# Patient Record
Sex: MALE | Race: WHITE | NOT HISPANIC OR LATINO | Employment: OTHER | URBAN - METROPOLITAN AREA
[De-identification: names, ages, dates, MRNs, and addresses within clinical notes are randomized per-mention and may not be internally consistent; named-entity substitution may affect disease eponyms.]

---

## 2017-01-06 ENCOUNTER — OFFICE VISIT (OUTPATIENT)
Dept: URGENT CARE | Facility: CLINIC | Age: 72
End: 2017-01-06
Payer: MEDICARE

## 2017-01-06 VITALS
WEIGHT: 242.94 LBS | BODY MASS INDEX: 35.26 KG/M2 | OXYGEN SATURATION: 96 % | TEMPERATURE: 96 F | DIASTOLIC BLOOD PRESSURE: 80 MMHG | HEART RATE: 73 BPM | SYSTOLIC BLOOD PRESSURE: 120 MMHG

## 2017-01-06 DIAGNOSIS — J06.9 ACUTE URI: Primary | ICD-10-CM

## 2017-01-06 DIAGNOSIS — J01.90 ACUTE NON-RECURRENT SINUSITIS, UNSPECIFIED LOCATION: Primary | ICD-10-CM

## 2017-01-06 LAB
CTP QC/QA: YES
S PYO RRNA THROAT QL PROBE: NEGATIVE

## 2017-01-06 PROCEDURE — 99213 OFFICE O/P EST LOW 20 MIN: CPT | Mod: S$GLB,,, | Performed by: PHYSICIAN ASSISTANT

## 2017-01-06 PROCEDURE — 99999 PR PBB SHADOW E&M-EST. PATIENT-LVL III: CPT | Mod: PBBFAC,,, | Performed by: PHYSICIAN ASSISTANT

## 2017-01-06 PROCEDURE — 87880 STREP A ASSAY W/OPTIC: CPT | Mod: QW,S$GLB,, | Performed by: PHYSICIAN ASSISTANT

## 2017-01-06 RX ORDER — AMOXICILLIN AND CLAVULANATE POTASSIUM 875; 125 MG/1; MG/1
1 TABLET, FILM COATED ORAL 2 TIMES DAILY
Qty: 14 TABLET | Refills: 0 | Status: SHIPPED | OUTPATIENT
Start: 2017-01-06 | End: 2017-01-09 | Stop reason: SINTOL

## 2017-01-06 NOTE — PROGRESS NOTES
Subjective:       Patient ID: Erendira Pretty is a 71 y.o. male.    Chief Complaint: Sinus Problem    HPI Comments: Patient states symptoms have been ongoing for over a week. He also states he has a nasal polyp superiorly in his L nostril that has been presents for 2wks.  He has a history of nasal polyps in the past for which he sees ENT.      Sinus Problem   This is a new problem. The current episode started 1 to 4 weeks ago. The problem has been gradually worsening since onset. There has been no fever. Associated symptoms include congestion, sinus pressure, sneezing and a sore throat. Pertinent negatives include no chills, coughing, diaphoresis, headaches, neck pain or shortness of breath. Treatments tried: Tylenol, Neti pot. The treatment provided mild relief.     Review of Systems   Constitutional: Negative for chills, diaphoresis and fever.   HENT: Positive for congestion, rhinorrhea, sinus pressure, sneezing and sore throat. Negative for postnasal drip.    Respiratory: Positive for wheezing (states this is chronic). Negative for cough, chest tightness and shortness of breath.    Gastrointestinal: Negative for abdominal pain, nausea and vomiting.   Musculoskeletal: Negative for back pain, neck pain and neck stiffness.   Neurological: Negative for dizziness, light-headedness and headaches.       Objective:      Physical Exam   Constitutional: Vital signs are normal. He appears well-developed and well-nourished.   HENT:   Head: Normocephalic and atraumatic.   Right Ear: Tympanic membrane and ear canal normal. No tenderness. Tympanic membrane is not erythematous.   Left Ear: Tympanic membrane and ear canal normal. No tenderness. Tympanic membrane is not erythematous.   Nose: Mucosal edema and rhinorrhea present. Right sinus exhibits no maxillary sinus tenderness and no frontal sinus tenderness. Left sinus exhibits no maxillary sinus tenderness and no frontal sinus tenderness.   Mouth/Throat: Uvula is midline and  oropharynx is clear and moist.   Unable to visualize polyp on exam   Cardiovascular: Normal rate and regular rhythm.    Pulmonary/Chest: Effort normal and breath sounds normal. No respiratory distress. He has no wheezes. He has no rhonchi. He has no rales.   Skin: Skin is warm and dry. He is not diaphoretic.       Assessment:       1. Acute URI        Plan:   Acute URI  -     POCT rapid strep A      Rx: AMOXICILLIN/POTASSIUM CLAV 1 tablet Oral BID      Reviewed negative rapid strep.     Printed and reviewed AVS.   Instructed patient to complete course of antibiotics and recommend nasal spray as well to help with sinus congestion.     Patient to see ENT on Monday for evaluation of his nasal polyp.      Patient instructions given in office:  Rest.  Drink plenty of clear fluids--at least 64 ounces of water/juice.  Normal saline nasal wash to irrigate sinuses and for congestion/runny nose.  Cool mist humidifier/vaporizer.  Practice good handwashing.  Zyrtec or Claritin to help dry mucus and post nasal drip.  Mucinex for cough and chest congestion.  Tylenol or Ibuprofen for fever, headache and body aches.  Warm salt water gargles for throat comfort.  Chloraseptic spray or lozenges for throat comfort.  See PCP or go to ER if symptoms worsen or fail to improve with treatment.

## 2017-01-06 NOTE — PATIENT INSTRUCTIONS
Sinusitis (Antibiotic Treatment)    The sinuses are air-filled spaces within the bones of the face. They connect to the inside of the nose. Sinusitis is an inflammation of the tissue lining the sinus cavity. Sinus inflammation can occur during a cold. It can also be due to allergies to pollens and other particles in the air. Sinusitis can cause symptoms of sinus congestion and fullness. A sinus infection causes fever, headache and facial pain. There is often green or yellow drainage from the nose or into the back of the throat (post-nasal drip). You have been given antibiotics to treat this condition.  Home care:  · Take the full course of antibiotics as instructed. Do not stop taking them, even if you feel better.  · Drink plenty of water, hot tea, and other liquids. This may help thin mucus. It also may promote sinus drainage.  · Heat may help soothe painful areas of the face. Use a towel soaked in hot water. Or,  the shower and direct the hot spray onto your face. Using a vaporizer along with a menthol rub at night may also help.   · An expectorant containing guaifenesin may help thin the mucus and promote drainage from the sinuses.  · Over-the-counter decongestants may be used unless a similar medicine was prescribed. Nasal sprays work the fastest. Use one that contains phenylephrine or oxymetazoline. First blow the nose gently. Then use the spray. Do not use these medicines more often than directed on the label or symptoms may get worse. You may also use tablets containing pseudoephedrine. Avoid products that combine ingredients, because side effects may be increased. Read labels. You can also ask the pharmacist for help. (NOTE: Persons with high blood pressure should not use decongestants. They can raise blood pressure.)  · Over-the-counter antihistamines may help if allergies contributed to your sinusitis.    · Do not use nasal rinses or irrigation during an acute sinus infection, unless told to by  your health care provider. Rinsing may spread the infection to other sinuses.  · Use acetaminophen or ibuprofen to control pain, unless another pain medicine was prescribed. (If you have chronic liver or kidney disease or ever had a stomach ulcer, talk with your doctor before using these medicines. Aspirin should never be used in anyone under 18 years of age who is ill with a fever. It may cause severe liver damage.)  · Don't smoke. This can worsen symptoms.  Follow-up care  Follow up with your healthcare provider or our staff if you are not improving within the next week.  When to seek medical advice  Call your healthcare provider if any of these occur:  · Facial pain or headache becoming more severe  · Stiff neck  · Unusual drowsiness or confusion  · Swelling of the forehead or eyelids  · Vision problems, including blurred or double vision  · Fever of 100.4ºF (38ºC) or higher, or as directed by your healthcare provider  · Seizure  · Breathing problems  · Symptoms not resolving within 10 days  © 6638-0276 [employer]. [address] [place] [address], [place] [address]. All rights reserved. This information is not intended as a substitute for professional medical care. Always follow your healthcare professional's instructions.    Rest.  Drink plenty of clear fluids--at least 64 ounces of water/juice.  Normal saline nasal wash to irrigate sinuses and for congestion/runny nose.  Cool mist humidifier/vaporizer.  Practice good handwashing.  Zyrtec or Claritin to help dry mucus and post nasal drip.  Mucinex for cough and chest congestion.  Tylenol or Ibuprofen for fever, headache and body aches.  Warm salt water gargles for throat comfort.  Chloraseptic spray or lozenges for throat comfort.  See PCP or go to ER if symptoms worsen or fail to improve with treatment.

## 2017-01-06 NOTE — MR AVS SNAPSHOT
[facility]  [id number] [place] [address]  Phone: [phone]  Fax: [phone]                  Erendira Pretty   2017 1:20 PM   Office Visit    Description:  Male : 1945   Provider:  Carmencita Clement PA-C   Department:  [facility]           Reason for Visit     Sinus Problem           Diagnoses this Visit        Comments    Acute URI    -  Primary            To Do List           Future Appointments        Provider Department Dept Phone    2017 1:30 PM Alejandro Parkinson MD [facility] [phone]    2017 10:25 AM LABORATORY, [facility] [phone]    2017 7:00 AM [facility] [phone]    2017 9:30 AM LABORATORY, [facility] [phone]    2017 8:00 AM Anna Tomas MD [facility] G[facility]ology [phone]      Goals (5 Years of Data)              Today    12/2/16    10/28/16    Plan meals           Notes - Note created  3/31/2016  4:13 PM by Nikhil Watson MD      Tips to Control Acid Reflux  To control acid reflux, youll need to make some basic diet and lifestyle changes. The simple steps outlined below may be all youll need to relieve discomfort.  Watch What You Eat    · Avoid fatty foods and spicy foods.  · Eat fewer acidic foods, such as citrus and tomato-based foods. These can increase symptoms.  · Limit drinking alcohol, caffeine, and fizzy beverages. All increase acid reflux.  · Try limiting chocolate, peppermint, and spearmint. These can worsen acid reflux in some people.  Watch When You Eat  · Avoid lying down for 3 hours after eating.  · Do not snack before going to bed.  Raise Your Head    Raising your head and upper body by 4 inches to 6 inches helps limit reflux when youre lying down. Put blocks under the head of the bed frame to raise it.    © 8318-8490 [employer]. [address] [place] [address], [place] [address]. All rights  reserved. This information is not intended as a substitute for professional medical care. Always follow your healthcare professional's instructions.      GERD (Adult)    The esophagus is a tube that carries food from the mouth to the stomach. A valve at the lower end of the esophagus prevents stomach acid from flowing upward. If this valve does not work properly, acid from the stomach enters the esophagus. If this occurs over and over, the acid will injure the lining of the esophagus.  This condition is called GERD (gastroesophageal reflux disease) or acid reflux. When stomach acid flows upward into the esophagus, it causes burning, pressure or sharp pain in the upper abdomen or mid to lower chest. The pain can spread to the neck, back, or shoulder, similar to heart pain (angina). There may be belching, an acid taste in the back of the throat, chronic cough, or sore throat or hoarseness. GERD symptoms often occur during the day after a big meal, but it can also occur at night when lying down. Smoking, as well as drinking alcohol, increases the risk of GERD.  GERD is a chronic condition. Once it begins, it is often lifelong. Treatment includes changes in eating habits and the use of acid blocker medications to decrease the amount of acid in the stomach.  Symptoms often improve with treatment, but if treatment is stopped, the symptoms usually return after a few months. So most persons with GERD will need to continue treatment.  Home Care:  · Take the prescribed acid blocker medication for the full course of treatment even if you begin to feel better sooner. This medication can take up to several days to fully control your symptoms. If you cant afford the prescribed medication, you can try over-the-counter acid blockers, such as Pepcid AC, Tagamet, Zantac, or Aciphex. If these do not relieve your symptoms, a stronger acid-blocker can be tried, such as Prilosec OTC.  · You can use antacids, such as Tums, Rolaids,  "Mylanta, or Maalox, for pain. This will be useful the first few days after starting acid blockers when the blockers havent started working yet. Follow the directions on the label. Liquid antacids may work better than tablets. Note that antacids can interfere with absorption of certain medications. Specifically, do not take Tagamet (cimetidine), Zantac (ranitidine), or Carafate (sucralfate) within 1 hour of taking an antacid. Talk with your pharmacist if you have any questions.  · Limit or avoid fatty, fried, and spicy foods, as well as coffee, chocolate, mint, and foods with high acid content such as tomatoes and citrus fruit and juices (orange, grapefruit, lemon).  · Avoid alcohol and smoking.  · Dont eat large meals, especially at night. Frequent, smaller meals are best. Do not lie down right after eating. And dont eat anything 3 hours before going to bed.  · If you are overweight, losing weight will reduce symptoms. Women should not wear corsets or girdles because this increases pressure on the stomach and worsens reflux.  · If your symptoms occur during sleep, use a foam wedge to elevate your upper body (not just your head.) Or, place 4" blocks under the head of your bed.  Follow Up  with your doctor or as advised by our staff. Further testing may be needed. If you do not begin to improve over the next 4 days, contact your doctor. If you had an x-ray, CT scan, or ECG (electrocardiogram), it will be reviewed by a specialist. Youll be notified of any new findings that affect your care.  Get Prompt Medical Attention  if any of the following occur:  · Stomach pain gets worse or moves to the lower right abdomen (appendix area)  · Chest pain appears or gets worse, or spreads to the back, neck, shoulder, or arm  · Frequent vomiting (cant keep down liquids)  · Blood in the stool or vomit (red or black in color)  · Feeling weak or dizzy, fainting, or trouble breathing  · Fever of 100.4ºF (38ºC) or higher, or as " directed by your healthcare provider  © 1254-1555 [employer]. [address] [place] [address], [place] [address]. All rights reserved. This information is not intended as a substitute for professional medical care. Always follow your healthcare professional's instructions.              Weight < 90.719 kg (200 lb)   110.2 kg (242 lb 15.2 oz)  108.2 kg (238 lb 8.6 oz)  108 kg (238 lb)    Notes - Note created  3/31/2016  4:12 PM by Nikhil Watson MD      Diabetes: Understanding Carbohydrates, Fats, and Protein  Food is a source of fuel and nourishment for your body. Its also a source of pleasure. Having diabetes doesnt mean you have to eat special foods or give up desserts. Instead, your dietitian can show you how to plan meals to suit your body. To start, learn how different foods affect blood sugar.    Carbohydrates  Carbohydrates are the main source of fuel for the body. Carbohydrates raise blood sugar. Many people think carbohydrates are only found in pasta or bread. But carbohydrates are actually in many kinds of foods:  · Sugars occur naturally in foods such as fruit, milk, honey, and molasses. Sugars can also be added to many foods, from cereals and yogurt to candy and desserts. Sugars raise blood sugar.  · Starches are found in bread, cereals, pasta, and dried beans. Theyre also found in corn, peas, potatoes, yam, acorn squash, and butternut squash. Starches also raise blood sugar.   · Fiber is found in foods such as vegetables, fruits, and whole grains. Unlike other carbs, fiber isnt digested or absorbed. So it doesnt raise blood sugar. In fact, fiber can help keep blood sugar from rising too fast. It also helps keep blood cholesterol at a healthy level.     Did You Know?  Even though carbohydrates raise blood sugar, its best to have some in every meal. They are an important part of a healthy diet.   Fat  Fat is an energy source that can be stored until needed. Fat does not raise blood sugar.  However, it can raise blood cholesterol, increasing the risk of heart disease. Fat is also high in calories, which can cause weight gain. Not all types of fat are the same.  More Healthy:  · Monounsaturated fats are mostly found in vegetable oils, such as olive, canola, and peanut oils. They are also found in avocados and some nuts. Monounsaturated fats are healthy for your heart. Thats because they lower LDL (unhealthy) cholesterol.  · Polyunsaturated fats are mostly found in vegetable oils, such as corn, safflower, and soybean oils. They are also found in some seeds, nuts, and fish. Polyunsaturated fats lower LDL (unhealthy) cholesterol. So, choosing them instead of saturated fats is healthy for your heart. Certain unsaturated fats can help lower triglycerides.   Less Healthy:  · Saturated fats are found in animal products, such as meat, poultry, whole milk, lard, and butter. Saturated fats raise LDL cholesterol and are not healthy for your heart.  · Hydrogenated oils and trans fats are formed when vegetable oils are processed into solid fats. They are found in many processed foods. Hydrogenated oils and trans fats raise LDL cholesterol and lower HDL (healthy) cholesterol. They are not healthy for your heart.  Protein  Protein helps the body build and repair muscle and other tissue. Protein has little or no effect on blood sugar. However, many foods that contain protein also contain saturated fat. By choosing low-fat protein sources, you can get the benefits of protein without the extra fat:  · Plant protein is found in dry beans and peas, nuts, and soy products, such as tofu and soymilk. These sources tend to be cholesterol-free and low in saturated fat.  · Animal protein is found in fish, poultry, meat, cheese, milk, and eggs. These contain cholesterol and can be high in saturated fat. Aim for lean, lower-fat choices.  © 3699-5317 [employer]. [address] [place] [address], [place] [address]. All rights  reserved. This information is not intended as a substitute for professional medical care. Always follow your healthcare professional's instructions.        Healthy Meals for Diabetes  Ask your health care team to help you make a meal plan that fits your needs. Your meal plan tells you when to eat your meals and snacks, what kinds of foods to eat, and how much of each food to eat. You dont have to give up all the foods you like. But you do need to follow some guidelines.  Eat foods rich in fiber  Fiber is a carbohydrate that breaks down slowly. Fiber is also healthy for your heart. Fiber-rich foods include:        Choose healthy protein foods  Eating protein that is low in fat can help you control your weight. It also helps keep your heart healthy. Low-fat protein foods include:  · Fish  · Plant proteins, such as dry beans and peas, nuts, and soy products like tofu and soymilk  · Lean meat with all visible fat removed  · Poultry with the skin removed  · Low-fat or nonfat milk, cheese, and yogurt  Limit unhealthy fats and sugar  Saturated and trans fats are unhealthy for your heart. They raise LDL (bad) cholesterol. Fat is also high in calories, so it can make you gain weight. To cut down on unhealthy fats and sugar, limit these foods:        How much to eat  The amount of food you eat affects your blood sugar. It also affects your weight. Your health care team will tell you how much of each type of food you should eat.  · Use measuring cups and spoons and a food scale to measure serving sizes.  · Learn what a correct serving size looks like on your plate. This will help when you are away from home and cant measure your servings.  · Eat only the number of servings given on your meal plan for each food. Dont take seconds.  When to eat  Your meal plan will likely include breakfast, lunch, dinner, and some snacks.  · Try to eat your meals and snacks at about the same times each day.  · Eat all your meals and snacks.  Skipping a meal or snack can make your blood sugar drop too low. It can also cause you to eat too much at the next meal or snack. Then your blood sugar could get too high.  © 2370-9667 [employer] [address] [place] [address], [place] [address]. All rights reserved. This information is not intended as a substitute for professional medical care. Always follow your healthcare professional's instructions.      Metabolic Syndrome: Losing Excess Weight  Metabolic syndrome is a set of 5 health factors that can lead to serious health problems. The factors greatly increase your risk for diabetes, heart attack, or stroke. Extra weight with a large waist is one of the factors for metabolic syndrome. Being overweight or obese means that you weigh too much for what is healthy for your height. A large waist size is 40 inches or more for men, and 35 inches or more for women.  But you can take steps to lose weight and lower your risk for serious health problems.  Benefits of weight loss  Even with a small weight loss, you may have more energy and feel better. Losing even a small amount of weight can affect your blood pressure, triglycerides, HDL cholesterol, and blood sugar. You may be able to take less medicine for blood pressure, cholesterol, or blood sugar. Or you may be able to stop taking medicine. As you lose weight, your risk for diabetes, heart attack, and stroke will get lower.  Getting started  The best way to lose weight is to do it gradually. For example, lose 1/2 to 1 pound a week. You will need to be more active and eat healthier foods. Make sure to:  · Exercise every day. Talk with your health care provider to make sure it is safe for you to exercise. Make sure you start slowly. Begin with 10 to 15 minutes of activity. Try to exercise or be active for at least 30 minutes most days of the week. You can exercise all at once or break it up into 10- or 15-minute sessions. And think about other ways you can be  more active throughout the day.  · Eat healthy foods. Most successful dieters make changes in what, when, and how much they eat. The best way to lose weight is to eat fewer calories. You should make sure you check your portion sizes, eat breakfast, plan your meals and snacks, and eat slowly.  Working with your health care provider  Talk with your health care provider. He or she can guide you through the process of losing weight. As you begin to make changes, your health care provider will:  · Check your weight loss progress  · Check your blood pressure and blood test results  · Talk with you about your results  · Make suggestions about diet and exercise  · Recommend other experts or programs  · Make changes to your medicines and help with any side effects  Getting additional support  It can be hard to make healthy lifestyle changes. It may take some time to create new habits.  Your health care provider may suggest other experts or programs to help you, such as:  · Health . A health  gives ongoing support and makes suggestions to help you with healthy lifestyle changes, like weight loss.  · Weight loss programs. There are many safe weight loss programs. Some are free or low-cost.  · Dietician. He or she can help you make changes to your diet.  · Exercise specialist. He or she can help you with an exercise plan.  · Counselor. A counselor can help you deal with your feelings and emotions. There are psychiatrists, psychologists, and social workers who specialize in weight problems.  · Bariatric or obesity specialist.  These health care providers are experts in obesity. They can help with diet, exercise, behavioral therapy or counseling, medicines for weight loss, and very low-calorie diets.  · Bariatric surgeon. Weight loss surgery may be a choice. But it is only advised for people who are over a certain weight, who have health problems because of their weight, and who have not been able to lose weight with  "other treatments.  Keeping the weight off  After losing weight, keeping it off can be even harder. Dont give up. Make sure to:  · Keep exercising. That means at least 40 to 60 minutes most days of the week.  · Keep eating healthy foods. Continue eating foods that are healthy and avoiding those that arent.  · Stay motivated. Watch your health improve. If you eat something unhealthy or skip exercising, dont give up. Simply make your next choice a healthy one.  © 8669-1557 [employer] [address] [place] [address], [place] [address]. All rights reserved. This information is not intended as a substitute for professional medical care. Always follow your healthcare professional's instructions.                Follow-Up and Disposition     Return if symptoms worsen or fail to improve.      [facility]     [facility] Nurse [facility] Line - 24/7 Assistance  Registered nurses in the [facility] Center provide clinical advisement, health education, appointment booking, and other advisory services.  Call for this free service at [phone].             Medications           Message regarding Medications     Verify the changes and/or additions to your medication regime listed below are the same as discussed with your clinician today.  If any of these changes or additions are incorrect, please notify your healthcare provider.             Verify that the below list of medications is an accurate representation of the medications you are currently taking.  If none reported, the list may be blank. If incorrect, please contact your healthcare provider. Carry this list with you in case of emergency.           Current Medications     ACIPHEX 20 mg tablet TAKE 1 TABLET BY MOUTH TWO TIMES A DAY    BD INSULIN PEN NEEDLE UF MINI 31 x 3/16 " Ndle USE AS DIRECTED    blood sugar diagnostic (BLOOD GLUCOSE TEST) Strp 1 strip by Misc.(Non-Drug; Combo Route) route 3 (three) times daily. accu-chek lux plus    " budesonide-formoterol 160-4.5 mcg (SYMBICORT) 160-4.5 mcg/actuation HFAA Inhale 2 puffs into the lungs daily as needed.    cycloSPORINE (RESTASIS) 0.05 % ophthalmic emulsion Place 0.4 mLs (1 drop total) into both eyes 2 (two) times daily.    GLUCOSAMINE HCL/CHONDR ROSARIO A NA (OSTEO BI-FLEX ORAL) Take 1 tablet by mouth once daily.    HUMALOG KWIKPEN 100 unit/mL InPn pen INJECT 3-4 UNITS INTO THE SKIN THREE TIMES DAILY BEFORE MEALS.    insulin glargine (LANTUS SOLOSTAR) 100 unit/mL (3 mL) InPn pen Inject 32 Units into the skin once daily. Inject 25-35 units into the skin once daily.    lancets (ACCU-CHEK FASTCLIX) Misc Test 2-3 times daily    LANTUS SOLOSTAR 100 unit/mL (3 mL) InPn pen INJECT 25-35 UNITS INTO THE SKIN ONCE DAILY.    levothyroxine (SYNTHROID) 137 MCG Tab tablet Take 2 tablets (274 mcg total) by mouth before breakfast.    metoprolol succinate (TOPROL-XL) 50 MG 24 hr tablet TAKE ONE BY MOUTH TWO TIMES A DAY.    MV,CA,IRON,MIN/FA/PHYTOSTEROL (CENTRUM SPECIALIST HEART ORAL) Take 1 tablet by mouth 2 (two) times daily.    niacin, inositol niacinate, (NIACIN FLUSH FREE) 400 mg niacin (500 mg) Cap Take 500 mg by mouth every evening.    OMEGA-3 FATTY ACIDS/DHA/EPA (MEGARED PLANT-OMEGA-3 ORAL) Take 1 capsule by mouth once daily.    psyllium (METAMUCIL) 0.52 gram capsule Take by mouth.    saw palmetto 80 MG capsule Take 80 mg by mouth 2 (two) times daily.    telmisartan-amlodipine 80-10 mg Tab Take 1 tablet by mouth once daily.           Clinical Reference Information           Vital Signs - Last Recorded  Most recent update: 1/6/2017  1:56 PM by Mehdi Mcdermott MA    BP Pulse Temp    120/80 (BP Location: Left arm, Patient Position: Sitting, BP Method: Manual) 73 96.3 °F (35.7 °C) (Tympanic)    Wt SpO2 BMI    110.2 kg (242 lb 15.2 oz) 96% 35.26 kg/m2      Blood Pressure          Most Recent Value    BP  120/80      Allergies as of 1/6/2017     Iodinated Contrast Media - Iv Dye    Omeprazole    Prilosec [Omeprazole  Magnesium]      Immunizations Administered on Date of Encounter - 1/6/2017     None      Orders Placed During Today's Visit      Normal Orders This Visit    POCT rapid strep A          1/6/2017  2:08 PM - Janie Mejia LPN      Component Results     Component Value Flag Ref Range Units Status    Rapid Strep A Screen Negative  Negative  Final     Acceptable Yes    Final            Instructions      Sinusitis (Antibiotic Treatment)    The sinuses are air-filled spaces within the bones of the face. They connect to the inside of the nose. Sinusitis is an inflammation of the tissue lining the sinus cavity. Sinus inflammation can occur during a cold. It can also be due to allergies to pollens and other particles in the air. Sinusitis can cause symptoms of sinus congestion and fullness. A sinus infection causes fever, headache and facial pain. There is often green or yellow drainage from the nose or into the back of the throat (post-nasal drip). You have been given antibiotics to treat this condition.  Home care:  · Take the full course of antibiotics as instructed. Do not stop taking them, even if you feel better.  · Drink plenty of water, hot tea, and other liquids. This may help thin mucus. It also may promote sinus drainage.  · Heat may help soothe painful areas of the face. Use a towel soaked in hot water. Or,  the shower and direct the hot spray onto your face. Using a vaporizer along with a menthol rub at night may also help.   · An expectorant containing guaifenesin may help thin the mucus and promote drainage from the sinuses.  · Over-the-counter decongestants may be used unless a similar medicine was prescribed. Nasal sprays work the fastest. Use one that contains phenylephrine or oxymetazoline. First blow the nose gently. Then use the spray. Do not use these medicines more often than directed on the label or symptoms may get worse. You may also use tablets containing  pseudoephedrine. Avoid products that combine ingredients, because side effects may be increased. Read labels. You can also ask the pharmacist for help. (NOTE: Persons with high blood pressure should not use decongestants. They can raise blood pressure.)  · Over-the-counter antihistamines may help if allergies contributed to your sinusitis.    · Do not use nasal rinses or irrigation during an acute sinus infection, unless told to by your health care provider. Rinsing may spread the infection to other sinuses.  · Use acetaminophen or ibuprofen to control pain, unless another pain medicine was prescribed. (If you have chronic liver or kidney disease or ever had a stomach ulcer, talk with your doctor before using these medicines. Aspirin should never be used in anyone under 18 years of age who is ill with a fever. It may cause severe liver damage.)  · Don't smoke. This can worsen symptoms.  Follow-up care  Follow up with your healthcare provider or our staff if you are not improving within the next week.  When to seek medical advice  Call your healthcare provider if any of these occur:  · Facial pain or headache becoming more severe  · Stiff neck  · Unusual drowsiness or confusion  · Swelling of the forehead or eyelids  · Vision problems, including blurred or double vision  · Fever of 100.4ºF (38ºC) or higher, or as directed by your healthcare provider  · Seizure  · Breathing problems  · Symptoms not resolving within 10 days  © 1902-2036 [employer]. [address] [place] [address]. All rights reserved. This information is not intended as a substitute for professional medical care. Always follow your healthcare professional's instructions.    Rest.  Drink plenty of clear fluids--at least 64 ounces of water/juice.  Normal saline nasal wash to irrigate sinuses and for congestion/runny nose.  Cool mist humidifier/vaporizer.  Practice good handwashing.  Zyrtec or Claritin to help dry mucus and post  nasal drip.  Mucinex for cough and chest congestion.  Tylenol or Ibuprofen for fever, headache and body aches.  Warm salt water gargles for throat comfort.  Chloraseptic spray or lozenges for throat comfort.  See PCP or go to ER if symptoms worsen or fail to improve with treatment.

## 2017-01-09 ENCOUNTER — OFFICE VISIT (OUTPATIENT)
Dept: OTOLARYNGOLOGY | Facility: CLINIC | Age: 72
End: 2017-01-09
Payer: MEDICARE

## 2017-01-09 ENCOUNTER — LAB VISIT (OUTPATIENT)
Dept: LAB | Facility: HOSPITAL | Age: 72
End: 2017-01-09
Attending: OTOLARYNGOLOGY
Payer: MEDICARE

## 2017-01-09 VITALS — BODY MASS INDEX: 35.2 KG/M2 | WEIGHT: 242.5 LBS | TEMPERATURE: 98 F

## 2017-01-09 DIAGNOSIS — J32.4 CHRONIC PANSINUSITIS: Primary | ICD-10-CM

## 2017-01-09 DIAGNOSIS — J33.9 MULTIPLE NASAL POLYPS: ICD-10-CM

## 2017-01-09 DIAGNOSIS — J32.4 CHRONIC PANSINUSITIS: ICD-10-CM

## 2017-01-09 LAB
BASOPHILS # BLD AUTO: 0.06 K/UL
BASOPHILS NFR BLD: 0.8 %
DIFFERENTIAL METHOD: ABNORMAL
EOSINOPHIL # BLD AUTO: 0.3 K/UL
EOSINOPHIL NFR BLD: 4.4 %
ERYTHROCYTE [DISTWIDTH] IN BLOOD BY AUTOMATED COUNT: 12.9 %
HCT VFR BLD AUTO: 43.7 %
HGB BLD-MCNC: 15.5 G/DL
LYMPHOCYTES # BLD AUTO: 2.6 K/UL
LYMPHOCYTES NFR BLD: 36.6 %
MCH RBC QN AUTO: 33.9 PG
MCHC RBC AUTO-ENTMCNC: 35.5 %
MCV RBC AUTO: 96 FL
MONOCYTES # BLD AUTO: 0.8 K/UL
MONOCYTES NFR BLD: 11.7 %
NEUTROPHILS # BLD AUTO: 3.3 K/UL
NEUTROPHILS NFR BLD: 46.2 %
PLATELET # BLD AUTO: 135 K/UL
PMV BLD AUTO: 11 FL
RBC # BLD AUTO: 4.57 M/UL
WBC # BLD AUTO: 7.11 K/UL

## 2017-01-09 PROCEDURE — 36415 COLL VENOUS BLD VENIPUNCTURE: CPT | Mod: PO

## 2017-01-09 PROCEDURE — 88305 TISSUE EXAM BY PATHOLOGIST: CPT | Mod: 26,,, | Performed by: PATHOLOGY

## 2017-01-09 PROCEDURE — 85025 COMPLETE CBC W/AUTO DIFF WBC: CPT

## 2017-01-09 PROCEDURE — 86003 ALLG SPEC IGE CRUDE XTRC EA: CPT | Mod: 59

## 2017-01-09 PROCEDURE — 99999 PR PBB SHADOW E&M-EST. PATIENT-LVL III: CPT | Mod: PBBFAC,,, | Performed by: OTOLARYNGOLOGY

## 2017-01-09 PROCEDURE — 88305 TISSUE EXAM BY PATHOLOGIST: CPT | Performed by: PATHOLOGY

## 2017-01-09 PROCEDURE — 31237 NSL/SINS NDSC SURG BX POLYPC: CPT | Mod: S$GLB,,, | Performed by: OTOLARYNGOLOGY

## 2017-01-09 PROCEDURE — 99203 OFFICE O/P NEW LOW 30 MIN: CPT | Mod: 25,S$GLB,, | Performed by: OTOLARYNGOLOGY

## 2017-01-09 PROCEDURE — 86003 ALLG SPEC IGE CRUDE XTRC EA: CPT

## 2017-01-09 RX ORDER — MOMETASONE FUROATE 50 UG/1
2 SPRAY, METERED NASAL DAILY
Qty: 17 G | Refills: 12 | Status: SHIPPED | OUTPATIENT
Start: 2017-01-09 | End: 2017-02-27 | Stop reason: SDUPTHER

## 2017-01-09 RX ORDER — LEVOFLOXACIN 500 MG/1
500 TABLET, FILM COATED ORAL DAILY
Qty: 21 TABLET | Refills: 0 | Status: SHIPPED | OUTPATIENT
Start: 2017-01-09 | End: 2017-01-30

## 2017-01-09 NOTE — MR AVS SNAPSHOT
[facility] - ENT  [address] [facility] [place] [address]  Phone: [phone]  Fax: [phone]                  Erendira Pretty   2017 1:30 PM   Office Visit    Description:  Male : 1945   Provider:  Alejandro Parkinson MD   Department:  [facility] - ENT           Reason for Visit     Nasal Polyps           Diagnoses this Visit        Comments    Chronic pansinusitis    -  Primary     Multiple nasal polyps                To Do List           Future Appointments        Provider Department Dept Phone    2017 5:45 PM LAB, SAME DAY [facility] [phone]    2017 10:25 AM LABORATORY, [facility] [phone]    2017 8:15 AM Alejandro Parkinson MD [facility] [phone]    2017 7:00 AM [facility] [phone]    2017 9:30 AM LABORATORY, [facility] [phone]      Goals (5 Years of Data)              Today    17    Plan meals           Notes - Note created  3/31/2016  4:13 PM by Nikhil Watson MD      Tips to Control Acid Reflux  To control acid reflux, youll need to make some basic diet and lifestyle changes. The simple steps outlined below may be all youll need to relieve discomfort.  Watch What You Eat    · Avoid fatty foods and spicy foods.  · Eat fewer acidic foods, such as citrus and tomato-based foods. These can increase symptoms.  · Limit drinking alcohol, caffeine, and fizzy beverages. All increase acid reflux.  · Try limiting chocolate, peppermint, and spearmint. These can worsen acid reflux in some people.  Watch When You Eat  · Avoid lying down for 3 hours after eating.  · Do not snack before going to bed.  Raise Your Head    Raising your head and upper body by 4 inches to 6 inches helps limit reflux when youre lying down. Put blocks under the head of the bed frame to raise it.    © 3267-5635 [employer]. [address] [place] [address], [place] [address]. All rights  reserved. This information is not intended as a substitute for professional medical care. Always follow your healthcare professional's instructions.      GERD (Adult)    The esophagus is a tube that carries food from the mouth to the stomach. A valve at the lower end of the esophagus prevents stomach acid from flowing upward. If this valve does not work properly, acid from the stomach enters the esophagus. If this occurs over and over, the acid will injure the lining of the esophagus.  This condition is called GERD (gastroesophageal reflux disease) or acid reflux. When stomach acid flows upward into the esophagus, it causes burning, pressure or sharp pain in the upper abdomen or mid to lower chest. The pain can spread to the neck, back, or shoulder, similar to heart pain (angina). There may be belching, an acid taste in the back of the throat, chronic cough, or sore throat or hoarseness. GERD symptoms often occur during the day after a big meal, but it can also occur at night when lying down. Smoking, as well as drinking alcohol, increases the risk of GERD.  GERD is a chronic condition. Once it begins, it is often lifelong. Treatment includes changes in eating habits and the use of acid blocker medications to decrease the amount of acid in the stomach.  Symptoms often improve with treatment, but if treatment is stopped, the symptoms usually return after a few months. So most persons with GERD will need to continue treatment.  Home Care:  · Take the prescribed acid blocker medication for the full course of treatment even if you begin to feel better sooner. This medication can take up to several days to fully control your symptoms. If you cant afford the prescribed medication, you can try over-the-counter acid blockers, such as Pepcid AC, Tagamet, Zantac, or Aciphex. If these do not relieve your symptoms, a stronger acid-blocker can be tried, such as Prilosec OTC.  · You can use antacids, such as Tums, Rolaids,  "Mylanta, or Maalox, for pain. This will be useful the first few days after starting acid blockers when the blockers havent started working yet. Follow the directions on the label. Liquid antacids may work better than tablets. Note that antacids can interfere with absorption of certain medications. Specifically, do not take Tagamet (cimetidine), Zantac (ranitidine), or Carafate (sucralfate) within 1 hour of taking an antacid. Talk with your pharmacist if you have any questions.  · Limit or avoid fatty, fried, and spicy foods, as well as coffee, chocolate, mint, and foods with high acid content such as tomatoes and citrus fruit and juices (orange, grapefruit, lemon).  · Avoid alcohol and smoking.  · Dont eat large meals, especially at night. Frequent, smaller meals are best. Do not lie down right after eating. And dont eat anything 3 hours before going to bed.  · If you are overweight, losing weight will reduce symptoms. Women should not wear corsets or girdles because this increases pressure on the stomach and worsens reflux.  · If your symptoms occur during sleep, use a foam wedge to elevate your upper body (not just your head.) Or, place 4" blocks under the head of your bed.  Follow Up  with your doctor or as advised by our staff. Further testing may be needed. If you do not begin to improve over the next 4 days, contact your doctor. If you had an x-ray, CT scan, or ECG (electrocardiogram), it will be reviewed by a specialist. Youll be notified of any new findings that affect your care.  Get Prompt Medical Attention  if any of the following occur:  · Stomach pain gets worse or moves to the lower right abdomen (appendix area)  · Chest pain appears or gets worse, or spreads to the back, neck, shoulder, or arm  · Frequent vomiting (cant keep down liquids)  · Blood in the stool or vomit (red or black in color)  · Feeling weak or dizzy, fainting, or trouble breathing  · Fever of 100.4ºF (38ºC) or higher, or as " directed by your healthcare provider  © 3126-6740 [employer]. [address] [place] [address], [place] [address]. All rights reserved. This information is not intended as a substitute for professional medical care. Always follow your healthcare professional's instructions.              Weight < 90.719 kg (200 lb)   110 kg (242 lb 8.1 oz)  110.2 kg (242 lb 15.2 oz)  108.2 kg (238 lb 8.6 oz)    Notes - Note created  3/31/2016  4:12 PM by Nikhil Watson MD      Diabetes: Understanding Carbohydrates, Fats, and Protein  Food is a source of fuel and nourishment for your body. Its also a source of pleasure. Having diabetes doesnt mean you have to eat special foods or give up desserts. Instead, your dietitian can show you how to plan meals to suit your body. To start, learn how different foods affect blood sugar.    Carbohydrates  Carbohydrates are the main source of fuel for the body. Carbohydrates raise blood sugar. Many people think carbohydrates are only found in pasta or bread. But carbohydrates are actually in many kinds of foods:  · Sugars occur naturally in foods such as fruit, milk, honey, and molasses. Sugars can also be added to many foods, from cereals and yogurt to candy and desserts. Sugars raise blood sugar.  · Starches are found in bread, cereals, pasta, and dried beans. Theyre also found in corn, peas, potatoes, yam, acorn squash, and butternut squash. Starches also raise blood sugar.   · Fiber is found in foods such as vegetables, fruits, and whole grains. Unlike other carbs, fiber isnt digested or absorbed. So it doesnt raise blood sugar. In fact, fiber can help keep blood sugar from rising too fast. It also helps keep blood cholesterol at a healthy level.     Did You Know?  Even though carbohydrates raise blood sugar, its best to have some in every meal. They are an important part of a healthy diet.   Fat  Fat is an energy source that can be stored until needed. Fat does not raise blood sugar.  However, it can raise blood cholesterol, increasing the risk of heart disease. Fat is also high in calories, which can cause weight gain. Not all types of fat are the same.  More Healthy:  · Monounsaturated fats are mostly found in vegetable oils, such as olive, canola, and peanut oils. They are also found in avocados and some nuts. Monounsaturated fats are healthy for your heart. Thats because they lower LDL (unhealthy) cholesterol.  · Polyunsaturated fats are mostly found in vegetable oils, such as corn, safflower, and soybean oils. They are also found in some seeds, nuts, and fish. Polyunsaturated fats lower LDL (unhealthy) cholesterol. So, choosing them instead of saturated fats is healthy for your heart. Certain unsaturated fats can help lower triglycerides.   Less Healthy:  · Saturated fats are found in animal products, such as meat, poultry, whole milk, lard, and butter. Saturated fats raise LDL cholesterol and are not healthy for your heart.  · Hydrogenated oils and trans fats are formed when vegetable oils are processed into solid fats. They are found in many processed foods. Hydrogenated oils and trans fats raise LDL cholesterol and lower HDL (healthy) cholesterol. They are not healthy for your heart.  Protein  Protein helps the body build and repair muscle and other tissue. Protein has little or no effect on blood sugar. However, many foods that contain protein also contain saturated fat. By choosing low-fat protein sources, you can get the benefits of protein without the extra fat:  · Plant protein is found in dry beans and peas, nuts, and soy products, such as tofu and soymilk. These sources tend to be cholesterol-free and low in saturated fat.  · Animal protein is found in fish, poultry, meat, cheese, milk, and eggs. These contain cholesterol and can be high in saturated fat. Aim for lean, lower-fat choices.  © 4058-5258 [employer]. [address] [place] [address], [place] [address]. All rights  reserved. This information is not intended as a substitute for professional medical care. Always follow your healthcare professional's instructions.        Healthy Meals for Diabetes  Ask your health care team to help you make a meal plan that fits your needs. Your meal plan tells you when to eat your meals and snacks, what kinds of foods to eat, and how much of each food to eat. You dont have to give up all the foods you like. But you do need to follow some guidelines.  Eat foods rich in fiber  Fiber is a carbohydrate that breaks down slowly. Fiber is also healthy for your heart. Fiber-rich foods include:        Choose healthy protein foods  Eating protein that is low in fat can help you control your weight. It also helps keep your heart healthy. Low-fat protein foods include:  · Fish  · Plant proteins, such as dry beans and peas, nuts, and soy products like tofu and soymilk  · Lean meat with all visible fat removed  · Poultry with the skin removed  · Low-fat or nonfat milk, cheese, and yogurt  Limit unhealthy fats and sugar  Saturated and trans fats are unhealthy for your heart. They raise LDL (bad) cholesterol. Fat is also high in calories, so it can make you gain weight. To cut down on unhealthy fats and sugar, limit these foods:        How much to eat  The amount of food you eat affects your blood sugar. It also affects your weight. Your health care team will tell you how much of each type of food you should eat.  · Use measuring cups and spoons and a food scale to measure serving sizes.  · Learn what a correct serving size looks like on your plate. This will help when you are away from home and cant measure your servings.  · Eat only the number of servings given on your meal plan for each food. Dont take seconds.  When to eat  Your meal plan will likely include breakfast, lunch, dinner, and some snacks.  · Try to eat your meals and snacks at about the same times each day.  · Eat all your meals and snacks.  Skipping a meal or snack can make your blood sugar drop too low. It can also cause you to eat too much at the next meal or snack. Then your blood sugar could get too high.  © 9313-3818 [employer]. [address] [place] [address], [place] [address]. All rights reserved. This information is not intended as a substitute for professional medical care. Always follow your healthcare professional's instructions.      Metabolic Syndrome: Losing Excess Weight  Metabolic syndrome is a set of 5 health factors that can lead to serious health problems. The factors greatly increase your risk for diabetes, heart attack, or stroke. Extra weight with a large waist is one of the factors for metabolic syndrome. Being overweight or obese means that you weigh too much for what is healthy for your height. A large waist size is 40 inches or more for men, and 35 inches or more for women.  But you can take steps to lose weight and lower your risk for serious health problems.  Benefits of weight loss  Even with a small weight loss, you may have more energy and feel better. Losing even a small amount of weight can affect your blood pressure, triglycerides, HDL cholesterol, and blood sugar. You may be able to take less medicine for blood pressure, cholesterol, or blood sugar. Or you may be able to stop taking medicine. As you lose weight, your risk for diabetes, heart attack, and stroke will get lower.  Getting started  The best way to lose weight is to do it gradually. For example, lose 1/2 to 1 pound a week. You will need to be more active and eat healthier foods. Make sure to:  · Exercise every day. Talk with your health care provider to make sure it is safe for you to exercise. Make sure you start slowly. Begin with 10 to 15 minutes of activity. Try to exercise or be active for at least 30 minutes most days of the week. You can exercise all at once or break it up into 10- or 15-minute sessions. And think about other ways you can be  more active throughout the day.  · Eat healthy foods. Most successful dieters make changes in what, when, and how much they eat. The best way to lose weight is to eat fewer calories. You should make sure you check your portion sizes, eat breakfast, plan your meals and snacks, and eat slowly.  Working with your health care provider  Talk with your health care provider. He or she can guide you through the process of losing weight. As you begin to make changes, your health care provider will:  · Check your weight loss progress  · Check your blood pressure and blood test results  · Talk with you about your results  · Make suggestions about diet and exercise  · Recommend other experts or programs  · Make changes to your medicines and help with any side effects  Getting additional support  It can be hard to make healthy lifestyle changes. It may take some time to create new habits.  Your health care provider may suggest other experts or programs to help you, such as:  · Health . A health  gives ongoing support and makes suggestions to help you with healthy lifestyle changes, like weight loss.  · Weight loss programs. There are many safe weight loss programs. Some are free or low-cost.  · Dietician. He or she can help you make changes to your diet.  · Exercise specialist. He or she can help you with an exercise plan.  · Counselor. A counselor can help you deal with your feelings and emotions. There are psychiatrists, psychologists, and social workers who specialize in weight problems.  · Bariatric or obesity specialist.  These health care providers are experts in obesity. They can help with diet, exercise, behavioral therapy or counseling, medicines for weight loss, and very low-calorie diets.  · Bariatric surgeon. Weight loss surgery may be a choice. But it is only advised for people who are over a certain weight, who have health problems because of their weight, and who have not been able to lose weight with  other treatments.  Keeping the weight off  After losing weight, keeping it off can be even harder. Dont give up. Make sure to:  · Keep exercising. That means at least 40 to 60 minutes most days of the week.  · Keep eating healthy foods. Continue eating foods that are healthy and avoiding those that arent.  · Stay motivated. Watch your health improve. If you eat something unhealthy or skip exercising, dont give up. Simply make your next choice a healthy one.  © 6495-0664 [employer]. [address] [place] [address], [place] [address]. All rights reserved. This information is not intended as a substitute for professional medical care. Always follow your healthcare professional's instructions.                 These Medications        Disp Refills Start End    levoFLOXacin (LEVAQUIN) 500 MG tablet 21 tablet 0 1/9/2017 1/30/2017    Take 1 tablet (500 mg total) by mouth once daily. - Oral    Pharmacy: [facility] [address] Ph #: [phone]       mometasone (NASONEX) 50 mcg/actuation nasal spray 17 g 12 1/9/2017     2 sprays by Nasal route once daily. - Nasal    Pharmacy: [facility] [place], [place] - [address] Ph #: [phone]         [facility]     [facility] On Call Nurse Care Line - 24/7 Assistance  Registered nurses in the [facility] provide clinical advisement, health education, appointment booking, and other advisory services.  Call for this free service at [phone].             Medications           Message regarding Medications     Verify the changes and/or additions to your medication regime listed below are the same as discussed with your clinician today.  If any of these changes or additions are incorrect, please notify your healthcare provider.        START taking these NEW medications        Refills    levoFLOXacin (LEVAQUIN) 500 MG tablet 0    Sig: Take 1 tablet (500 mg total) by mouth once daily.    Class: Normal    Route: Oral  "   mometasone (NASONEX) 50 mcg/actuation nasal spray 12    Si sprays by Nasal route once daily.    Class: Normal    Route: Nasal      STOP taking these medications     amoxicillin-clavulanate 875-125mg (AUGMENTIN) 875-125 mg per tablet Take 1 tablet by mouth 2 (two) times daily.           Verify that the below list of medications is an accurate representation of the medications you are currently taking.  If none reported, the list may be blank. If incorrect, please contact your healthcare provider. Carry this list with you in case of emergency.           Current Medications     ACIPHEX 20 mg tablet TAKE 1 TABLET BY MOUTH TWO TIMES A DAY    BD INSULIN PEN NEEDLE UF MINI 31 x 3/16 " Ndle USE AS DIRECTED    blood sugar diagnostic (BLOOD GLUCOSE TEST) Strp 1 strip by Misc.(Non-Drug; Combo Route) route 3 (three) times daily. accu-chek lux plus    budesonide-formoterol 160-4.5 mcg (SYMBICORT) 160-4.5 mcg/actuation HFAA Inhale 2 puffs into the lungs daily as needed.    cycloSPORINE (RESTASIS) 0.05 % ophthalmic emulsion Place 0.4 mLs (1 drop total) into both eyes 2 (two) times daily.    GLUCOSAMINE HCL/CHONDR ROSARIO A NA (OSTEO BI-FLEX ORAL) Take 1 tablet by mouth once daily.    HUMALOG KWIKPEN 100 unit/mL InPn pen INJECT 3-4 UNITS INTO THE SKIN THREE TIMES DAILY BEFORE MEALS.    insulin glargine (LANTUS SOLOSTAR) 100 unit/mL (3 mL) InPn pen Inject 32 Units into the skin once daily. Inject 25-35 units into the skin once daily.    lancets (ACCU-CHEK FASTCLIX) Misc Test 2-3 times daily    LANTUS SOLOSTAR 100 unit/mL (3 mL) InPn pen INJECT 25-35 UNITS INTO THE SKIN ONCE DAILY.    levothyroxine (SYNTHROID) 137 MCG Tab tablet Take 2 tablets (274 mcg total) by mouth before breakfast.    metoprolol succinate (TOPROL-XL) 50 MG 24 hr tablet TAKE ONE BY MOUTH TWO TIMES A DAY.    MV,CA,IRON,MIN/FA/PHYTOSTEROL (CENTRUM SPECIALIST HEART ORAL) Take 1 tablet by mouth 2 (two) times daily.    niacin, inositol niacinate, (NIACIN FLUSH " FREE) 400 mg niacin (500 mg) Cap Take 500 mg by mouth every evening.    OMEGA-3 FATTY ACIDS/DHA/EPA (MEGARED PLANT-OMEGA-3 ORAL) Take 1 capsule by mouth once daily.    psyllium (METAMUCIL) 0.52 gram capsule Take by mouth.    saw palmetto 80 MG capsule Take 80 mg by mouth 2 (two) times daily.    telmisartan-amlodipine 80-10 mg Tab Take 1 tablet by mouth once daily.    levoFLOXacin (LEVAQUIN) 500 MG tablet Take 1 tablet (500 mg total) by mouth once daily.    mometasone (NASONEX) 50 mcg/actuation nasal spray 2 sprays by Nasal route once daily.           Clinical Reference Information           Vital Signs - Last Recorded  Most recent update: 1/9/2017  1:47 PM by Tigist Urbano LPN    Temp Wt BMI          98.1 °F (36.7 °C) (Tympanic) 110 kg (242 lb 8.1 oz) 35.2 kg/m2        Allergies as of 1/9/2017     Iodinated Contrast Media - Iv Dye    Omeprazole    Prilosec [Omeprazole Magnesium]      Immunizations Administered on Date of Encounter - 1/9/2017     None      Orders Placed During Today's Visit      Normal Orders This Visit    Tissue Specimen To Pathology, Surgery     Future Labs/Procedures Expected by Expires    Allergen, Cocklebur  1/9/2017 3/10/2018    Allergen, [place]m [place]  1/9/2017 3/10/2018    Allergen, Meadow Grass ([facility])  1/9/2017 3/10/2018    Allergen, Mucor Racemosus  1/9/2017 3/10/2018    Allergen, Pecan Lyman IgE  1/9/2017 3/10/2018    Allergen, [place]  1/9/2017 3/10/2018    Allergen-Alternaria Alternata  1/9/2017 3/10/2018    Allergen-[place]  1/9/2017 3/10/2018    Allergen-Common Pigweed  1/9/2017 3/10/2018    Allergen-[place]  1/9/2017 3/10/2018    Aspergillus fumagatus IgE  1/9/2017 3/10/2018    Bermuda grass IgE  1/9/2017 3/10/2018    Cat epithelium IgE  1/9/2017 3/10/2018    CBC auto differential  1/9/2017 3/10/2018    Cladosporium IgE  1/9/2017 3/10/2018    Cockroach, American IgE  1/9/2017 3/10/2018    Spreckels, bald IgE  1/9/2017 3/10/2018    D. farinae IgE  1/9/2017 3/10/2018    D.  pteronyssinus IgE  1/9/2017 3/10/2018    Dog dander IgE  1/9/2017 3/10/2018    Feather Panel #2  1/9/2017 3/10/2018    Carter grass IgE  1/9/2017 3/10/2018    Jenkins elder, rough IgE  1/9/2017 3/10/2018    Mugwort IgE  1/9/2017 3/10/2018    Nettle IgE  1/9/2017 3/10/2018    [place], white IgE  1/9/2017 3/10/2018    Penicillium IgE  1/9/2017 3/10/2018    Plantain, English IgE  1/9/2017 3/10/2018    Ragweed, short, common IgE  1/9/2017 3/10/2018    RAST Allergen for [place]  1/9/2017 3/10/2018    RAST Allergen Maple ([place])  1/9/2017 3/10/2018    RAST Allergen [place]  1/9/2017 3/10/2018    RAST Allergen, Lamb's Quarters  1/9/2017 3/10/2018    RAST Allergen, [place])  1/9/2017 3/10/2018    Camacho IgE  1/9/2017 3/10/2018

## 2017-01-09 NOTE — PROGRESS NOTES
Referring Provider:    Self Referral  No address on file  Subjective:   Patient: Erendira Pretty [id number], :1945   Visit date:2017 2:26 PM    Chief Complaint:  Nasal Polyps (Patient has history of nasal polyps. Has had removal x2. )    HPI:  Erendira is a 71 y.o. male who I was asked to see in consultation for evaluation of the following issue(s):    Sinonasal / Allergy: Erendira has bilateral (left greater than right) severe nasal obstruction. He reports the the following sinonasal symptoms: facial pain, facial pressure, nasal obstruction and significant history of prior nasal surgery, including polypectomy x2 (last one was 15 years ago).     He has moderate allergic rhinitis with symptoms including nasal congestion, nasal itchiness and nasal rhinorrhea.     Allergy testing for this patient was not done.    Current smoker:  No    He started Augmentin last week.  He has not seen significant benefit.  He is having significant diarrhea from this.      Review of Systems:  Negative unless checked off.  Gen:  []fever   []fatigue  HENT:  []nosebleeds  []dental problem   Eyes:  []photophobia  []visual disturbance  Resp:  []chest tightness []wheezing  Card:  []chest pain  []leg swelling  GI:  []abdominal pain []blood in stool  :  []dysuria  []hematuria  Musc:  []joint swelling  []gait problem  Skin:  []color change  []pallor  Neuro:  []seizures  []numbness  Hem:  []bruise/bleed easily  Psych:  []hallucinations  []behavioral problems  Allergy/Imm: is allergic to iodinated contrast media - iv dye; omeprazole; and prilosec [omeprazole magnesium].    His meds, allergies, medical, surgical, social & family histories were reviewed & updated:  -     He has a current medication list which includes the following prescription(s): aciphex, bd insulin pen needle uf mini, blood sugar diagnostic, budesonide-formoterol 160-4.5 mcg, cyclosporine, glucosamine hcl/chondr leach a na, humalog kwikpen, insulin glargine, lancets, lantus  solostar, levothyroxine, metoprolol succinate, mv,ca,iron,min/fa/phytosterol, niacin (inositol niacinate), omega-3 fatty acids/dha/epa, psyllium, saw palmetto, telmisartan-amlodipine, levofloxacin, and mometasone.  -     He  has a past medical history of Asthma; BPH (benign prostatic hyperplasia); CAD (coronary artery disease); Cirrhosis; Claudication (4/9/2014); Colon polyp; ED (erectile dysfunction); Ex-smoker; Hearing loss NEC; Hepatitis C; Hyperlipidemia; Hypertension; Hypothyroid; Osteoarthritis; PVD (peripheral vascular disease); Sleep apnea; and Type 2 diabetes mellitus.   -     He  does not have any pertinent problems on file.   -     He  has a past surgical history that includes Knee surgery; Trigger finger release; Carpal tunnel release; Elbow bursa surgery (2010); Rectal surgery (2012); Eye surgery; Cataract extraction w/ intraocular lens  implant, bilateral (2008); Cataract extraction; Cardiac catheterization; Colonoscopy (8/2013); and Colonoscopy (N/A, 5/30/2016).  -     He  reports that he quit smoking about 44 years ago. He has a 2.00 pack-year smoking history. He quit smokeless tobacco use about 40 years ago. He reports that he does not drink alcohol or use illicit drugs.  -     His family history includes Heart disease in his brother, father, and mother. There is no history of Colon cancer.  -     He is allergic to iodinated contrast media - iv dye; omeprazole; and prilosec [omeprazole magnesium].    Objective:     Physical Exam:  Vitals:    Visit Vitals    Temp 98.1 °F (36.7 °C) (Tympanic)    Wt 110 kg (242 lb 8.1 oz)    BMI 35.2 kg/m2     General appearance:  Well developed, well nourished    Eyes:  Extraocular motions intact, PERRL    Communication:  no hoarseness, no dysphonia    Ears:  Otoscopy of external auditory canals and tympanic membranes was normal, clinical speech reception thresholds grossly intact, no mass/lesion of auricle.  Nose:  Moderate edema and polyps bilaterally  Mouth:  No  mass/lesion of lips, teeth, gums, hard/soft palate, tongue, tonsils, or oropharynx.    Cardiovascular:  No pedal edema; Radial Pulses +2     Neck & Lymphatics:  No cervical lymphadenopathy, no neck mass/crepitus/ asymmetry, trachea is midline, no thyroid enlargement/tenderness/mass.    Psych: Oriented x3,  Alert with normal mood and affect.     Respiration/Chest:  Symmetric expansion during respiration, normal respiratory effort.    Skin:  Warm and intact. No ulcerations of face, scalp, neck.    Assessment & Plan:   Erendira was seen today for nasal polyps.    Diagnoses and all orders for this visit:    Chronic pansinusitis  -     Aspergillus fumagatus IgE; Future  -     Bermuda grass IgE; Future  -     Cat epithelium IgE; Future  -     Cladosporium IgE; Future  -     Cockroach, American IgE; Future  -     [place], bald IgE; Future  -     D. farinae IgE; Future  -     D. pteronyssinus IgE; Future  -     Dog dander IgE; Future  -     Plantain, English IgE; Future  -     Carter grass IgE; Future  -     Marsh elder, rough IgE; Future  -     Mugwort IgE; Future  -     Nettle IgE; Future  -     Oak, white IgE; Future  -     Penicillium IgE; Future  -     Ragweed, short, common IgE; Future  -     Camacho IgE; Future  -     Allergen, Cocklebur; Future  -     Allergen, Elm Cedar; Future  -     Allergen, Meadow Grass ([place] Blue); Future  -     Allergen, Mucor Racemosus; Future  -     Allergen, Pecan [place] IgE; Future  -     Allergen, [place]; Future  -     Allergen-Alternaria Alternata; Future  -     Allergen-Cedar; Future  -     Allergen-Common Pigweed; Future  -     Allergen-Silver Birch; Future  -     Feather Panel #2; Future  -     RAST Allergen for Eastern [place]; Future  -     RAST Allergen Maple ([place]); Future  -     RAST Allergen [place]; Future  -     RAST Allergen, Lamb's Quarters; Future  -     RAST Allergen, Sheep [place]ellow [place]); Future  -     CBC auto differential; Future    Multiple nasal  polyps  -     Tissue Specimen To Pathology, Surgery  -     Aspergillus fumagatus IgE; Future  -     Bermuda grass IgE; Future  -     Cat epithelium IgE; Future  -     Cladosporium IgE; Future  -     Cockroach, American IgE; Future  -     [place], bald IgE; Future  -     D. farinae IgE; Future  -     D. pteronyssinus IgE; Future  -     Dog dander IgE; Future  -     Plantain, English IgE; Future  -     Carter grass IgE; Future  -     Marsh elder, rough IgE; Future  -     Mugwort IgE; Future  -     Nettle IgE; Future  -     Oak, white IgE; Future  -     Penicillium IgE; Future  -     Ragweed, short, common IgE; Future  -     Camacho IgE; Future  -     Allergen, Cocklebur; Future  -     Allergen, Elm Cedar; Future  -     Allergen, Meadow Grass ([facility] Blue); Future  -     Allergen, Mucor Racemosus; Future  -     Allergen, Pecan [place] IgE; Future  -     Allergen, [place]; Future  -     Allergen-Alternaria Alternata; Future  -     Allergen-Cedar; Future  -     Allergen-Common Pigweed; Future  -     Allergen-Silver Birch; Future  -     Feather Panel #2; Future  -     RAST Allergen for Eastern [place]; Future  -     RAST Allergen Maple ([place]); Future  -     RAST Allergen [place]; Future  -     RAST Allergen, Lamb's Quarters; Future  -     RAST Allergen, Sheep [place](Yellow Dock); Future  -     CBC auto differential; Future    Other orders  -     levoFLOXacin (LEVAQUIN) 500 MG tablet; Take 1 tablet (500 mg total) by mouth once daily.  -     mometasone (NASONEX) 50 mcg/actuation nasal spray; 2 sprays by Nasal route once daily.      -SINUSITIS/RHINITIS  Erendira presents today for initial evaluation.  They have multiple sinonasal complaints and determination of the underlying etiology is a problem of moderate to high complexity.  Patients may present with sinonasal symptoms such as nasal obstruction as a primary anatomic disorder.  Patients may also present with recurrent or chronic inflammatory sinonasal symptoms.   Generally, patients can be stratified into one of several groups.      The first group represents patients who have frequent or recurrent upper respiratory infections, most frequently viral.  These patients most frequently have normally functioning immune system's but have high levels of exposure.  This is commonly seen in nurses and schoolteachers as well as other groups who spend large amounts of time around 6 patient's and children.      Other patients may have isolated rhinitis without evidence of sinusitis.  The majority of these patients have ALLERGIC rhinitis however other groups may have more rare forms of rhinitis such as nonallergic rhinitis with eosinophilia syndrome.  As a screening evaluation, if the patient has had a normal endoscopy, from time to time a simple x-ray of the sinuses may be performed in combination with antigen specific ALLERGY testing.    The third group of patients present with significant evidence of chronic sinusitis.  Nasal endoscopy may reveal polyps or purulent drainage.  CT scan is an important aspect to determining the extent of disease.  Some patients with chronic sinusitis have an underlying etiology of ALLERGY leading to obstruction of the ostiomeatal units with furthering of the infection.  Others may have an acute infection that leads to stenosis of the sinonasal openings followed by a long, progressive course of infection.  Patients with unilateral disease who do not have inverting papilloma typically fall into this latter group.    CT scan of the sinuses has not been performed.      Antigen specific allergy testing  has not been performed.    Allergy treatment with topical steroids and/or antihistamines has not been used with good compliance with symptoms unchanged.      By review of all data, history and exam, there does not seem to be a clear distinction between allergic rhinitis versus rhinitis with a component of chronic sinusitis.       My recommendation is antigen  specific allergy testing, Nasonex BID, Levaquin 21 days.        We discussed his medical conditions, treatments and plan.  Erendira should return to clinic if any issues arise (symptoms worsen or persist), otherwise we will see him back in the clinic In 4 weeks.    Thank you for allowing me to participate in the care of Erendira.    Alejandro Parkinson MD        NASAL ENDOSCOPY WITH POLYPECTOMY &/OR DEBRIDEMENT  (cpt 00631)  Procedure: Risks, benefits, and alternatives of the procedure were discussed with the patient, and the patient consented to the nasal endoscopy with debridement.  The nasal cavity was sprayed with a topical decongestant and anesthetic . The endoscope was passed into each nostril and each nasal cavity was visualized.  On each side the nasal cavity, sinuses (if open), turbinates, and septum were examined with the findings described below.  Crusting and polypoid material was removed with suction and straight non thru cut forceps.   At the end of the examination, the scope was removed. The patient tolerated the procedure well with no complications.     Endoscopic Sinonasal Exam Findings:  Polyps in right MM            Left MM filled with polyps    Large polyp at left choana, removed            -     Nasal secretions: thin mucous bilaterally  -     Nasal septum: no significant deviation and no perforation appreciated   -     Inferior turbinate: hypertrophy or edema (Mild) bilaterally  -     Middle turbinate: hypertrophy or edema (Moderate) bilaterally  -     Other findings: - no mass or obstructive lesion -

## 2017-01-11 LAB
A ALTERNATA IGE QN: <0.35 KU/L
A FUMIGATUS IGE QN: <0.35 KU/L
ALLERGEN BOXELDER MAPLE TREE IGE: 5.2 KU/L
ALLERGEN MAPLE (BOX ELDER) CLASS: ABNORMAL
ALLERGEN MULBERRY CLASS: ABNORMAL
ALLERGEN MULBERRY TREE IGE: 3.98 KU/L
ALLERGEN PENICILLIUM IGE: <0.35 KU/L
ALLERGEN WHITE ASH TREE IGE: 5.26 KU/L
AMER SYCAMORE IGE QN: 3.76 KU/L
BALD CYPRESS IGE QN: 1.8 KU/L
BERMUDA GRASS IGE QN: 6.56 KU/L
C HERBARUM IGE QN: <0.35 KU/L
CAT DANDER IGE QN: <0.35 KU/L
CEDAR IGE QN: 0.52 KU/L
CMN PIGWEED IGE QN: 3.53 KU/L
COCKLEBUR IGE QN: 4.7 KU/L
COMMON RAGWEED IGE QN: 5.46 KU/L
D FARINAE IGE QN: 5.12 KU/L
D PTERONYSS IGE QN: 6.77 KU/L
DEPRECATED A ALTERNATA IGE RAST QL: NORMAL
DEPRECATED A FUMIGATUS IGE RAST QL: NORMAL
DEPRECATED BALD CYPRESS IGE RAST QL: ABNORMAL
DEPRECATED BERMUDA GRASS IGE RAST QL: ABNORMAL
DEPRECATED C HERBARUM IGE RAST QL: NORMAL
DEPRECATED CAT DANDER IGE RAST QL: NORMAL
DEPRECATED CEDAR IGE RAST QL: ABNORMAL
DEPRECATED COCKLEBUR IGE RAST QL: ABNORMAL
DEPRECATED COMMON PIGWEED IGE RAST QL: ABNORMAL
DEPRECATED COMMON RAGWEED IGE RAST QL: ABNORMAL
DEPRECATED D FARINAE IGE RAST QL: ABNORMAL
DEPRECATED D PTERONYSS IGE RAST QL: ABNORMAL
DEPRECATED DOG DANDER IGE RAST QL: NORMAL
DEPRECATED ENGL PLANTAIN IGE RAST QL: ABNORMAL
DEPRECATED GOOSEFOOT IGE RAST QL: ABNORMAL
DEPRECATED JOHNSON GRASS IGE RAST QL: ABNORMAL
DEPRECATED KENT BLUE GRASS IGE RAST QL: ABNORMAL
DEPRECATED M RACEMOSUS IGE RAST QL: ABNORMAL
DEPRECATED MARSH ELDER IGE RAST QL: ABNORMAL
DEPRECATED MUGWORT IGE RAST QL: ABNORMAL
DEPRECATED NETTLE IGE RAST QL: ABNORMAL
DEPRECATED PECAN/HICK TREE IGE RAST QL: ABNORMAL
DEPRECATED ROACH IGE RAST QL: ABNORMAL
DEPRECATED SHEEP SORREL IGE RAST QL: ABNORMAL
DEPRECATED SILVER BIRCH IGE RAST QL: ABNORMAL
DEPRECATED TIMOTHY IGE RAST QL: ABNORMAL
DEPRECATED WHITE OAK IGE RAST QL: ABNORMAL
DOG DANDER IGE QN: <0.35 KU/L
ELM CEDAR CLASS: ABNORMAL
ELM CEDAR, IGE: 4.44 KU/L
ENGL PLANTAIN IGE QN: 5.25 KU/L
FEATHER PANEL #2: <0.35 KU/L
GOOSEFOOT IGE QN: 5.49 KU/L
JOHNSON GRASS IGE QN: 4.9 KU/L
KENT BLUE GRASS IGE QN: 8.5 KU/L
M RACEMOSUS IGE QN: 2.06 KU/L
MARSH ELDER IGE QN: 6.25 KU/L
MUGWORT IGE QN: 3.15 KU/L
NETTLE IGE QN: 2.77 KU/L
PECAN/HICK TREE IGE QN: 2.9 KU/L
PENICILLIUM CLASS: NORMAL
ROACH IGE QN: 5.62 KU/L
SHEEP SORREL IGE QN: 5.86 KU/L
SILVER BIRCH IGE QN: 1.31 KU/L
TIMOTHY IGE QN: 6.51 KU/L
WHITE ASH CLASS: ABNORMAL
WHITE OAK IGE QN: 4.16 KU/L

## 2017-01-12 ENCOUNTER — TELEPHONE (OUTPATIENT)
Dept: OTOLARYNGOLOGY | Facility: CLINIC | Age: 72
End: 2017-01-12

## 2017-01-12 ENCOUNTER — LAB VISIT (OUTPATIENT)
Dept: LAB | Facility: HOSPITAL | Age: 72
End: 2017-01-12
Attending: FAMILY MEDICINE
Payer: MEDICARE

## 2017-01-12 DIAGNOSIS — E03.9 ACQUIRED HYPOTHYROIDISM: ICD-10-CM

## 2017-01-12 LAB
T4 FREE SERPL-MCNC: 1.04 NG/DL
TSH SERPL DL<=0.005 MIU/L-ACNC: 5.75 UIU/ML

## 2017-01-12 PROCEDURE — 84439 ASSAY OF FREE THYROXINE: CPT

## 2017-01-12 PROCEDURE — 84443 ASSAY THYROID STIM HORMONE: CPT

## 2017-01-12 PROCEDURE — 36415 COLL VENOUS BLD VENIPUNCTURE: CPT | Mod: PO

## 2017-01-12 RX ORDER — LEVOCETIRIZINE DIHYDROCHLORIDE 5 MG/1
5 TABLET, FILM COATED ORAL NIGHTLY
Qty: 30 TABLET | Refills: 11 | Status: SHIPPED | OUTPATIENT
Start: 2017-01-12 | End: 2018-01-02 | Stop reason: SDUPTHER

## 2017-01-12 RX ORDER — AZELASTINE 1 MG/ML
2 SPRAY, METERED NASAL 2 TIMES DAILY
Qty: 30 ML | Refills: 12 | Status: SHIPPED | OUTPATIENT
Start: 2017-01-12 | End: 2017-04-03

## 2017-01-12 RX ORDER — MONTELUKAST SODIUM 10 MG/1
10 TABLET ORAL DAILY
Qty: 30 TABLET | Refills: 6 | Status: SHIPPED | OUTPATIENT
Start: 2017-01-12 | End: 2017-02-11

## 2017-01-13 NOTE — TELEPHONE ENCOUNTER
Spoke with patient's wife, Agata. Notified her of pathology results as well as the new medications that patient was prescribed. Reviewed the pharmacy with her and stated that patient should take these medications as prescribed and keep scheduled follow up with Dr. Parkinson. Patient's wife verbalized understanding.

## 2017-01-24 RX ORDER — INSULIN LISPRO 100 [IU]/ML
INJECTION, SOLUTION INTRAVENOUS; SUBCUTANEOUS
Qty: 3 ML | Refills: 1 | Status: SHIPPED | OUTPATIENT
Start: 2017-01-24 | End: 2017-03-08 | Stop reason: SDUPTHER

## 2017-01-26 ENCOUNTER — TELEPHONE (OUTPATIENT)
Dept: INTERNAL MEDICINE | Facility: CLINIC | Age: 72
End: 2017-01-26

## 2017-01-26 NOTE — TELEPHONE ENCOUNTER
----- Message from Vannessa May sent at 1/26/2017 12:29 PM CST -----  Contact: Edgar Sotelo [facility]  States she needs pt A1C and blood pressure results, can be reached at [phone]//thanksMW

## 2017-01-29 ENCOUNTER — TELEPHONE (OUTPATIENT)
Dept: INTERNAL MEDICINE | Facility: CLINIC | Age: 72
End: 2017-01-29

## 2017-01-29 DIAGNOSIS — E03.4 HYPOTHYROIDISM DUE TO ACQUIRED ATROPHY OF THYROID: Primary | ICD-10-CM

## 2017-01-30 NOTE — TELEPHONE ENCOUNTER
Getting almostenough thyroid med  Cont same dose and make sure not to miss and make sure taking in am on empty stomach/no other meds/food for an hour after  Berhane tsh 6 weeks

## 2017-02-01 ENCOUNTER — TELEPHONE (OUTPATIENT)
Dept: PULMONOLOGY | Facility: CLINIC | Age: 72
End: 2017-02-01

## 2017-02-01 DIAGNOSIS — J44.9 CHRONIC OBSTRUCTIVE PULMONARY DISEASE, UNSPECIFIED COPD TYPE: Primary | ICD-10-CM

## 2017-02-05 RX ORDER — LEVOFLOXACIN 500 MG/1
TABLET, FILM COATED ORAL
Qty: 21 TABLET | Refills: 0 | OUTPATIENT
Start: 2017-02-05

## 2017-02-08 ENCOUNTER — TELEPHONE (OUTPATIENT)
Dept: INTERNAL MEDICINE | Facility: CLINIC | Age: 72
End: 2017-02-08

## 2017-02-08 RX ORDER — INSULIN GLARGINE 100 [IU]/ML
40 INJECTION, SOLUTION SUBCUTANEOUS DAILY
Qty: 12 ML | Refills: 6 | Status: CANCELLED
Start: 2017-02-08

## 2017-02-08 NOTE — TELEPHONE ENCOUNTER
----- Message from Promise Lyons sent at 2/8/2017 12:08 PM CST -----  Contact: [facility] pharmacy  Call caller regarding on ACIPHEX can they do the generic cause its a lot cheaper.   ...[phone]      ..  [facility] - [place], [place] - [address] [facility] [address]  [address] [address] [address]  Phone: [phone] Fax: [phone]

## 2017-02-08 NOTE — TELEPHONE ENCOUNTER
----- Message from Yary Escalera sent at 2/8/2017 12:04 PM CST -----  Contact: [facility] pharmacy  [facility] pharmacy calling regarding a prescription,pt states dr up his dose and they needa new prescription.Rx-lantus ....[phone]

## 2017-02-08 NOTE — TELEPHONE ENCOUNTER
I am not sure, he has not been seen in our dept since 6/30/16, and I do not see where changes were made.

## 2017-02-08 NOTE — TELEPHONE ENCOUNTER
Pt refused to scheduled appt with Diabetes mgmt, stated he will continue to let Dr. Roblero treat him for diabetes.    Pt needs refill on Lantus 40 units daily.

## 2017-02-09 NOTE — TELEPHONE ENCOUNTER
----- Message from Alysha Estrada sent at 2/9/2017  1:56 PM CST -----  Contact: Kimi with [facility]   Kimi called and stated she needed to speak to the nurse. She stated the she needs a new prescription and and new directions on Lantis Solostar. She can be reached at  [phone].    Thanks,  TF

## 2017-02-09 NOTE — TELEPHONE ENCOUNTER
----- Message from Alysha Estrada sent at 2/9/2017  1:56 PM CST -----  Contact: Kimi with [facility]   Kimi called and stated she needed to speak to the nurse. She stated the she needs a new prescription and and new directions on Lantis Solostar. She can be reached at  [phone].    Thanks,  TF

## 2017-02-10 RX ORDER — INSULIN GLARGINE 100 [IU]/ML
40 INJECTION, SOLUTION SUBCUTANEOUS DAILY
Qty: 1 BOX | Refills: 11 | Status: SHIPPED | OUTPATIENT
Start: 2017-02-10 | End: 2017-12-11 | Stop reason: SDUPTHER

## 2017-02-13 ENCOUNTER — OFFICE VISIT (OUTPATIENT)
Dept: OTOLARYNGOLOGY | Facility: CLINIC | Age: 72
End: 2017-02-13
Payer: MEDICARE

## 2017-02-13 VITALS
WEIGHT: 249.56 LBS | BODY MASS INDEX: 35.73 KG/M2 | HEIGHT: 70 IN | SYSTOLIC BLOOD PRESSURE: 147 MMHG | TEMPERATURE: 98 F | HEART RATE: 70 BPM | DIASTOLIC BLOOD PRESSURE: 73 MMHG | RESPIRATION RATE: 18 BRPM

## 2017-02-13 DIAGNOSIS — J30.89 PERENNIAL ALLERGIC RHINITIS, UNSPECIFIED ALLERGIC RHINITIS TRIGGER: ICD-10-CM

## 2017-02-13 DIAGNOSIS — J33.9 MULTIPLE NASAL POLYPS: ICD-10-CM

## 2017-02-13 DIAGNOSIS — J32.4 CHRONIC PANSINUSITIS: Primary | ICD-10-CM

## 2017-02-13 PROCEDURE — 99214 OFFICE O/P EST MOD 30 MIN: CPT | Mod: S$GLB,,, | Performed by: OTOLARYNGOLOGY

## 2017-02-13 PROCEDURE — 99999 PR PBB SHADOW E&M-EST. PATIENT-LVL III: CPT | Mod: PBBFAC,,, | Performed by: OTOLARYNGOLOGY

## 2017-02-13 RX ORDER — CYCLOSPORINE 0.5 MG/ML
EMULSION OPHTHALMIC
Qty: 60 EACH | Refills: 0 | Status: SHIPPED | OUTPATIENT
Start: 2017-02-13 | End: 2017-05-09 | Stop reason: SDUPTHER

## 2017-02-13 RX ORDER — MONTELUKAST SODIUM 10 MG/1
10 TABLET ORAL DAILY
Qty: 30 TABLET | Refills: 0 | Status: SHIPPED | OUTPATIENT
Start: 2017-02-13 | End: 2017-02-27 | Stop reason: SDUPTHER

## 2017-02-13 NOTE — MR AVS SNAPSHOT
[facility]  [address] [facility] [place] [address]  Phone: [phone]  Fax: [phone]                  Erendira Pretty   2017 8:15 AM   Office Visit    Description:  Male : 1945   Provider:  Alejandro Parkinson MD   Department:  [facility] - ENT           Reason for Visit     Other                To Do List           Future Appointments        Provider Department Dept Phone    2017 1:00 PM [facility] XR2 [facility] [phone]    2017 1:20 PM PULMONARY LAB, [facility] [phone]    2017 1:40 PM Jose Wren MD [facility] [phone]    3/15/2017 9:55 AM LABORATORY, [facility] [phone]    2017 7:00 AM [facility] US1 [facility] [phone]      Goals (5 Years of Data)              Today    17    Plan meals           Notes - Note created  3/31/2016  4:13 PM by Nikhil Watson MD      Tips to Control Acid Reflux  To control acid reflux, youll need to make some basic diet and lifestyle changes. The simple steps outlined below may be all youll need to relieve discomfort.  Watch What You Eat    · Avoid fatty foods and spicy foods.  · Eat fewer acidic foods, such as citrus and tomato-based foods. These can increase symptoms.  · Limit drinking alcohol, caffeine, and fizzy beverages. All increase acid reflux.  · Try limiting chocolate, peppermint, and spearmint. These can worsen acid reflux in some people.  Watch When You Eat  · Avoid lying down for 3 hours after eating.  · Do not snack before going to bed.  Raise Your Head    Raising your head and upper body by 4 inches to 6 inches helps limit reflux when youre lying down. Put blocks under the head of the bed frame to raise it.    © 1127-8377 [employer]. [address] [place] [address], [place] [address]. All rights reserved. This information is not intended as a substitute for professional medical care. Always follow your  healthcare professional's instructions.      GERD (Adult)    The esophagus is a tube that carries food from the mouth to the stomach. A valve at the lower end of the esophagus prevents stomach acid from flowing upward. If this valve does not work properly, acid from the stomach enters the esophagus. If this occurs over and over, the acid will injure the lining of the esophagus.  This condition is called GERD (gastroesophageal reflux disease) or acid reflux. When stomach acid flows upward into the esophagus, it causes burning, pressure or sharp pain in the upper abdomen or mid to lower chest. The pain can spread to the neck, back, or shoulder, similar to heart pain (angina). There may be belching, an acid taste in the back of the throat, chronic cough, or sore throat or hoarseness. GERD symptoms often occur during the day after a big meal, but it can also occur at night when lying down. Smoking, as well as drinking alcohol, increases the risk of GERD.  GERD is a chronic condition. Once it begins, it is often lifelong. Treatment includes changes in eating habits and the use of acid blocker medications to decrease the amount of acid in the stomach.  Symptoms often improve with treatment, but if treatment is stopped, the symptoms usually return after a few months. So most persons with GERD will need to continue treatment.  Home Care:  · Take the prescribed acid blocker medication for the full course of treatment even if you begin to feel better sooner. This medication can take up to several days to fully control your symptoms. If you cant afford the prescribed medication, you can try over-the-counter acid blockers, such as Pepcid AC, Tagamet, Zantac, or Aciphex. If these do not relieve your symptoms, a stronger acid-blocker can be tried, such as Prilosec OTC.  · You can use antacids, such as Tums, Rolaids, Mylanta, or Maalox, for pain. This will be useful the first few days after starting acid blockers when the  "blockers havent started working yet. Follow the directions on the label. Liquid antacids may work better than tablets. Note that antacids can interfere with absorption of certain medications. Specifically, do not take Tagamet (cimetidine), Zantac (ranitidine), or Carafate (sucralfate) within 1 hour of taking an antacid. Talk with your pharmacist if you have any questions.  · Limit or avoid fatty, fried, and spicy foods, as well as coffee, chocolate, mint, and foods with high acid content such as tomatoes and citrus fruit and juices (orange, grapefruit, lemon).  · Avoid alcohol and smoking.  · Dont eat large meals, especially at night. Frequent, smaller meals are best. Do not lie down right after eating. And dont eat anything 3 hours before going to bed.  · If you are overweight, losing weight will reduce symptoms. Women should not wear corsets or girdles because this increases pressure on the stomach and worsens reflux.  · If your symptoms occur during sleep, use a foam wedge to elevate your upper body (not just your head.) Or, place 4" blocks under the head of your bed.  Follow Up  with your doctor or as advised by our staff. Further testing may be needed. If you do not begin to improve over the next 4 days, contact your doctor. If you had an x-ray, CT scan, or ECG (electrocardiogram), it will be reviewed by a specialist. Youll be notified of any new findings that affect your care.  Get Prompt Medical Attention  if any of the following occur:  · Stomach pain gets worse or moves to the lower right abdomen (appendix area)  · Chest pain appears or gets worse, or spreads to the back, neck, shoulder, or arm  · Frequent vomiting (cant keep down liquids)  · Blood in the stool or vomit (red or black in color)  · Feeling weak or dizzy, fainting, or trouble breathing  · Fever of 100.4ºF (38ºC) or higher, or as directed by your healthcare provider  © 5985-3278 [employer]. [address] [place] [address], [place], " [place] [address]. All rights reserved. This information is not intended as a substitute for professional medical care. Always follow your healthcare professional's instructions.              Weight < 90.719 kg (200 lb)   113.2 kg (249 lb 9 oz)  110 kg (242 lb 8.1 oz)  110.2 kg (242 lb 15.2 oz)    Notes - Note created  3/31/2016  4:12 PM by Nikhil Watson MD      Diabetes: Understanding Carbohydrates, Fats, and Protein  Food is a source of fuel and nourishment for your body. Its also a source of pleasure. Having diabetes doesnt mean you have to eat special foods or give up desserts. Instead, your dietitian can show you how to plan meals to suit your body. To start, learn how different foods affect blood sugar.    Carbohydrates  Carbohydrates are the main source of fuel for the body. Carbohydrates raise blood sugar. Many people think carbohydrates are only found in pasta or bread. But carbohydrates are actually in many kinds of foods:  · Sugars occur naturally in foods such as fruit, milk, honey, and molasses. Sugars can also be added to many foods, from cereals and yogurt to candy and desserts. Sugars raise blood sugar.  · Starches are found in bread, cereals, pasta, and dried beans. Theyre also found in corn, peas, potatoes, yam, acorn squash, and butternut squash. Starches also raise blood sugar.   · Fiber is found in foods such as vegetables, fruits, and whole grains. Unlike other carbs, fiber isnt digested or absorbed. So it doesnt raise blood sugar. In fact, fiber can help keep blood sugar from rising too fast. It also helps keep blood cholesterol at a healthy level.     Did You Know?  Even though carbohydrates raise blood sugar, its best to have some in every meal. They are an important part of a healthy diet.   Fat  Fat is an energy source that can be stored until needed. Fat does not raise blood sugar. However, it can raise blood cholesterol, increasing the risk of heart disease. Fat is also high in calories,  which can cause weight gain. Not all types of fat are the same.  More Healthy:  · Monounsaturated fats are mostly found in vegetable oils, such as olive, canola, and peanut oils. They are also found in avocados and some nuts. Monounsaturated fats are healthy for your heart. Thats because they lower LDL (unhealthy) cholesterol.  · Polyunsaturated fats are mostly found in vegetable oils, such as corn, safflower, and soybean oils. They are also found in some seeds, nuts, and fish. Polyunsaturated fats lower LDL (unhealthy) cholesterol. So, choosing them instead of saturated fats is healthy for your heart. Certain unsaturated fats can help lower triglycerides.   Less Healthy:  · Saturated fats are found in animal products, such as meat, poultry, whole milk, lard, and butter. Saturated fats raise LDL cholesterol and are not healthy for your heart.  · Hydrogenated oils and trans fats are formed when vegetable oils are processed into solid fats. They are found in many processed foods. Hydrogenated oils and trans fats raise LDL cholesterol and lower HDL (healthy) cholesterol. They are not healthy for your heart.  Protein  Protein helps the body build and repair muscle and other tissue. Protein has little or no effect on blood sugar. However, many foods that contain protein also contain saturated fat. By choosing low-fat protein sources, you can get the benefits of protein without the extra fat:  · Plant protein is found in dry beans and peas, nuts, and soy products, such as tofu and soymilk. These sources tend to be cholesterol-free and low in saturated fat.  · Animal protein is found in fish, poultry, meat, cheese, milk, and eggs. These contain cholesterol and can be high in saturated fat. Aim for lean, lower-fat choices.  © 0257-4713 [employer]. [address] [place] [address], [place] [address]. All rights reserved. This information is not intended as a substitute for professional medical care. Always follow your  healthcare professional's instructions.        Healthy Meals for Diabetes  Ask your health care team to help you make a meal plan that fits your needs. Your meal plan tells you when to eat your meals and snacks, what kinds of foods to eat, and how much of each food to eat. You dont have to give up all the foods you like. But you do need to follow some guidelines.  Eat foods rich in fiber  Fiber is a carbohydrate that breaks down slowly. Fiber is also healthy for your heart. Fiber-rich foods include:        Choose healthy protein foods  Eating protein that is low in fat can help you control your weight. It also helps keep your heart healthy. Low-fat protein foods include:  · Fish  · Plant proteins, such as dry beans and peas, nuts, and soy products like tofu and soymilk  · Lean meat with all visible fat removed  · Poultry with the skin removed  · Low-fat or nonfat milk, cheese, and yogurt  Limit unhealthy fats and sugar  Saturated and trans fats are unhealthy for your heart. They raise LDL (bad) cholesterol. Fat is also high in calories, so it can make you gain weight. To cut down on unhealthy fats and sugar, limit these foods:        How much to eat  The amount of food you eat affects your blood sugar. It also affects your weight. Your health care team will tell you how much of each type of food you should eat.  · Use measuring cups and spoons and a food scale to measure serving sizes.  · Learn what a correct serving size looks like on your plate. This will help when you are away from home and cant measure your servings.  · Eat only the number of servings given on your meal plan for each food. Dont take seconds.  When to eat  Your meal plan will likely include breakfast, lunch, dinner, and some snacks.  · Try to eat your meals and snacks at about the same times each day.  · Eat all your meals and snacks. Skipping a meal or snack can make your blood sugar drop too low. It can also cause you to eat too much at the  next meal or snack. Then your blood sugar could get too high.  © 2610-2698 "[employer] [address] [place] [address], [place] [address]. All rights reserved. This information is not intended as a substitute for professional medical care. Always follow your healthcare professional's instructions.      Metabolic Syndrome: Losing Excess Weight  Metabolic syndrome is a set of 5 health factors that can lead to serious health problems. The factors greatly increase your risk for diabetes, heart attack, or stroke. Extra weight with a large waist is one of the factors for metabolic syndrome. Being overweight or obese means that you weigh too much for what is healthy for your height. A large waist size is 40 inches or more for men, and 35 inches or more for women.  But you can take steps to lose weight and lower your risk for serious health problems.  Benefits of weight loss  Even with a small weight loss, you may have more energy and feel better. Losing even a small amount of weight can affect your blood pressure, triglycerides, HDL cholesterol, and blood sugar. You may be able to take less medicine for blood pressure, cholesterol, or blood sugar. Or you may be able to stop taking medicine. As you lose weight, your risk for diabetes, heart attack, and stroke will get lower.  Getting started  The best way to lose weight is to do it gradually. For example, lose 1/2 to 1 pound a week. You will need to be more active and eat healthier foods. Make sure to:  · Exercise every day. Talk with your health care provider to make sure it is safe for you to exercise. Make sure you start slowly. Begin with 10 to 15 minutes of activity. Try to exercise or be active for at least 30 minutes most days of the week. You can exercise all at once or break it up into 10- or 15-minute sessions. And think about other ways you can be more active throughout the day.  · Eat healthy foods. Most successful dieters make changes in what, when, and  how much they eat. The best way to lose weight is to eat fewer calories. You should make sure you check your portion sizes, eat breakfast, plan your meals and snacks, and eat slowly.  Working with your health care provider  Talk with your health care provider. He or she can guide you through the process of losing weight. As you begin to make changes, your health care provider will:  · Check your weight loss progress  · Check your blood pressure and blood test results  · Talk with you about your results  · Make suggestions about diet and exercise  · Recommend other experts or programs  · Make changes to your medicines and help with any side effects  Getting additional support  It can be hard to make healthy lifestyle changes. It may take some time to create new habits.  Your health care provider may suggest other experts or programs to help you, such as:  · Health . A health  gives ongoing support and makes suggestions to help you with healthy lifestyle changes, like weight loss.  · Weight loss programs. There are many safe weight loss programs. Some are free or low-cost.  · Dietician. He or she can help you make changes to your diet.  · Exercise specialist. He or she can help you with an exercise plan.  · Counselor. A counselor can help you deal with your feelings and emotions. There are psychiatrists, psychologists, and social workers who specialize in weight problems.  · Bariatric or obesity specialist.  These health care providers are experts in obesity. They can help with diet, exercise, behavioral therapy or counseling, medicines for weight loss, and very low-calorie diets.  · Bariatric surgeon. Weight loss surgery may be a choice. But it is only advised for people who are over a certain weight, who have health problems because of their weight, and who have not been able to lose weight with other treatments.  Keeping the weight off  After losing weight, keeping it off can be even harder. Dont give  up. Make sure to:  · Keep exercising. That means at least 40 to 60 minutes most days of the week.  · Keep eating healthy foods. Continue eating foods that are healthy and avoiding those that arent.  · Stay motivated. Watch your health improve. If you eat something unhealthy or skip exercising, dont give up. Simply make your next choice a healthy one.  © 5325-9947 [employer]. [address] [place] [address], [place] [address]. All rights reserved. This information is not intended as a substitute for professional medical care. Always follow your healthcare professional's instructions.                 These Medications        Disp Refills Start End    montelukast (SINGULAIR) 10 mg tablet 30 tablet 0 2/13/2017 3/15/2017    Take 1 tablet (10 mg total) by mouth once daily. - Oral    Pharmacy: [facility] [address] [facility] [address]  #: [phone]         [facility] On [facility]     [facility] On Call Nurse Care Line - 24/7 Assistance  Registered nurses in the [facility] provide clinical advisement, health education, appointment booking, and other advisory services.  Call for this free service at [phone].             Medications           Message regarding Medications     Verify the changes and/or additions to your medication regime listed below are the same as discussed with your clinician today.  If any of these changes or additions are incorrect, please notify your healthcare provider.        START taking these NEW medications        Refills    montelukast (SINGULAIR) 10 mg tablet 0    Sig: Take 1 tablet (10 mg total) by mouth once daily.    Class: Normal    Route: Oral           Verify that the below list of medications is an accurate representation of the medications you are currently taking.  If none reported, the list may be blank. If incorrect, please contact your healthcare provider. Carry this list with you in case of emergency.           Current Medications     ACIPHEX 20 mg  "tablet TAKE 1 TABLET BY MOUTH TWO TIMES A DAY    azelastine (ASTELIN) 137 mcg (0.1 %) nasal spray 2 sprays (274 mcg total) by Nasal route 2 (two) times daily.    BD INSULIN PEN NEEDLE UF MINI 31 x 3/16 " Ndle USE AS DIRECTED    blood sugar diagnostic (BLOOD GLUCOSE TEST) Strp 1 strip by Misc.(Non-Drug; Combo Route) route 3 (three) times daily. accu-chek lux plus    budesonide-formoterol 160-4.5 mcg (SYMBICORT) 160-4.5 mcg/actuation HFAA Inhale 2 puffs into the lungs daily as needed.    cycloSPORINE (RESTASIS) 0.05 % ophthalmic emulsion Place 0.4 mLs (1 drop total) into both eyes 2 (two) times daily.    GLUCOSAMINE HCL/CHONDR ROSARIO A NA (OSTEO BI-FLEX ORAL) Take 1 tablet by mouth once daily.    HUMALOG KWIKPEN 100 unit/mL InPn pen INJECT 3-4 UNITS INTO THE SKIN THREE TIMES DAILY BEFORE MEALS.    insulin glargine (LANTUS SOLOSTAR) 100 unit/mL (3 mL) InPn pen Inject 40 Units into the skin once daily.    lancets (ACCU-CHEK FASTCLIX) Misc Test 2-3 times daily    levocetirizine (XYZAL) 5 MG tablet Take 1 tablet (5 mg total) by mouth every evening.    levothyroxine (SYNTHROID) 137 MCG Tab tablet Take 2 tablets (274 mcg total) by mouth before breakfast.    metoprolol succinate (TOPROL-XL) 50 MG 24 hr tablet TAKE ONE BY MOUTH TWO TIMES A DAY.    mometasone (NASONEX) 50 mcg/actuation nasal spray 2 sprays by Nasal route once daily.    MV,CA,IRON,MIN/FA/PHYTOSTEROL (CENTRUM SPECIALIST HEART ORAL) Take 1 tablet by mouth 2 (two) times daily.    niacin, inositol niacinate, (NIACIN FLUSH FREE) 400 mg niacin (500 mg) Cap Take 500 mg by mouth every evening.    OMEGA-3 FATTY ACIDS/DHA/EPA (MEGARED PLANT-OMEGA-3 ORAL) Take 1 capsule by mouth once daily.    psyllium (METAMUCIL) 0.52 gram capsule Take by mouth.    RESTASIS 0.05 % ophthalmic emulsion PLACE 1 DROP IN BOTH EYES TWO TIMES A DAY    saw palmetto 80 MG capsule Take 80 mg by mouth 2 (two) times daily.    TWYNSTA 80-10 mg Tab TAKE ONE BY MOUTH EVERY DAY.    montelukast (SINGULAIR) " "10 mg tablet Take 1 tablet (10 mg total) by mouth once daily.           Clinical Reference Information           Your Vitals Were     BP Pulse Temp Resp Height Weight    147/73 70 98.3 °F (36.8 °C) (Tympanic) 18 5' 9.6" (1.768 m) 113.2 kg (249 lb 9 oz)    BMI                36.22 kg/m2          Blood Pressure          Most Recent Value    BP  (!)  147/73      Allergies as of 2/13/2017     Iodinated Contrast Media - Iv Dye    Omeprazole    Prilosec [Omeprazole Magnesium]      Immunizations Administered on Date of Encounter - 2/13/2017     None      [facility] Sign-Up     Activating your [facility] account is as easy as 1-2-3!     1) Visit [email or web address], select Sign Up Now, enter this activation code and your date of birth, then select Next.  Activation code not generated  Current Patient Portal Status: Account disabled      2) Create a username and password to use when you visit [facility] in the future and select a security question in case you lose your password and select Next.    3) Enter your e-mail address and click Sign Up!    Additional Information  If you have questions, please e-mail [email or web address] or call [phone] to talk to our [facility] staff. Remember, [facility] is NOT to be used for urgent needs. For medical emergencies, dial 911.         Language Assistance Services     ATTENTION: Language assistance services are available, free of charge. Please call [phone].      ATENCIÓN: Si habla español, tiene a leach disposición servicios gratuitos de asistencia lingüística. Llame al [phone].     CHÚ Ý: N?u b?n nói Ti?ng Vi?t, có các d?ch v? h? tr? ngôn ng? mi?n phí dành cho b?n. G?i s? [phone].         [facility] complies with applicable Federal civil rights laws and does not discriminate on the basis of race, color, national origin, age, disability, or sex.        "

## 2017-02-13 NOTE — PROGRESS NOTES
Referring Provider:    No referring provider defined for this encounter.  Subjective:   Patient: Erendira Pretty [id number], :1945   Visit date:2017 2:26 PM    Chief Complaint:  Other (follow up on polyps)    HPI:  Erendira is a 71 y.o. male who I was asked to see in consultation for evaluation of the following issue(s):    Patient was initially seen in 2017.  He reported significant nasal obstruction worse on the left than the right.  He also reported facial pain, pressure and discomfort.  He had had nasal polypectomy performed twice with the last one in about the year .  He had never had ALLERGY testing performed.  He reported moderate ALLERGIC symptoms of nasal congestion, nasal itchiness and rhinorrhea.  No imaging had been performed prior to seeing me.  He had been started on Augmentin shortly before his initial clinic visit and was having significant gastrointestinal issues with this.  He does have asthma.  Nasal endoscopy revealed bilateral nasal polyps with a very large polyp on the left side.  This was removed.  I placed him on levofloxacin, Nasonex twice a day and Astelin twice a day.  He was not given steroids due to his diabetes.  He return to clinic 2017.  He reports that his nasal obstruction is significantly better.  Additionally he is no longer having facial pain or pressure.  He denies significant nasal drainage.  He is having a persistent cough that is been lingering for about 2 months now.  He was on Singulair in the past but has been off of that for some time.  He is having to use his inhalers, specifically his albuterol inhaler multiple times daily.  ALLERGY testing by blood work was performed and revealed numerous class III ALLERGIES.          FINAL PATHOLOGIC DIAGNOSIS  SOFT TISSUE, LEFT NASAL CAVITY (EXCISION):  Benign respiratory mucosa with abundant eosinophils (up to 80 per 1 high-power field)  Diagnosed by: Fiona Zamora M.D.  (Electronically Signed:  2017-01-11 12:05:27)    Review of Systems:  -     Allergic/Immunologic: is allergic to iodinated contrast media - iv dye; omeprazole; and prilosec [omeprazole magnesium]..  -     Constitutional: Current temp: 98.3 °F (36.8 °C) (Tympanic)      His meds, allergies, medical, surgical, social & family histories were reviewed & updated:  -     He has a current medication list which includes the following prescription(s): aciphex, azelastine, bd insulin pen needle uf mini, blood sugar diagnostic, budesonide-formoterol 160-4.5 mcg, cyclosporine, glucosamine hcl/chondr leach a na, humalog kwikpen, insulin glargine, lancets, levocetirizine, levothyroxine, metoprolol succinate, mometasone, mv,ca,iron,min/fa/phytosterol, niacin (inositol niacinate), omega-3 fatty acids/dha/epa, psyllium, restasis, saw palmetto, twynsta, and montelukast.  -     He  has a past medical history of Asthma; BPH (benign prostatic hyperplasia); CAD (coronary artery disease); Cirrhosis; Claudication (4/9/2014); Colon polyp; ED (erectile dysfunction); Ex-smoker; Hearing loss NEC; Hepatitis C; Hyperlipidemia; Hypertension; Hypothyroid; Osteoarthritis; PVD (peripheral vascular disease); Sleep apnea; and Type 2 diabetes mellitus.   -     He  does not have any pertinent problems on file.   -     He  has a past surgical history that includes Knee surgery; Trigger finger release; Carpal tunnel release; Elbow bursa surgery (2010); Rectal surgery (2012); Eye surgery; Cataract extraction w/ intraocular lens  implant, bilateral (2008); Cataract extraction; Cardiac catheterization; Colonoscopy (8/2013); and Colonoscopy (N/A, 5/30/2016).  -     He  reports that he quit smoking about 44 years ago. He has a 2.00 pack-year smoking history. He quit smokeless tobacco use about 40 years ago. He reports that he does not drink alcohol or use illicit drugs.  -     His family history includes Heart disease in his brother, father, and mother. There is no history of Colon  "cancer.  -     He is allergic to iodinated contrast media - iv dye; omeprazole; and prilosec [omeprazole magnesium].    Objective:     Physical Exam:  Vitals:    Visit Vitals    BP (!) 147/73    Pulse 70    Temp 98.3 °F (36.8 °C) (Tympanic)    Resp 18    Ht 5' 9.6" (1.768 m)    Wt 113.2 kg (249 lb 9 oz)    BMI 36.22 kg/m2     General appearance:  Well developed, well nourished    Eyes:  Extraocular motions intact, PERRL    Communication:  no hoarseness, no dysphonia    Ears:  Otoscopy of external auditory canals and tympanic membranes was normal, clinical speech reception thresholds grossly intact, no mass/lesion of auricle.  Nose:  Moderate edema and polyps bilaterally  Mouth:  No mass/lesion of lips, teeth, gums, hard/soft palate, tongue, tonsils, or oropharynx.    Cardiovascular:  No pedal edema; Radial Pulses +2     Neck & Lymphatics:  No cervical lymphadenopathy, no neck mass/crepitus/ asymmetry, trachea is midline, no thyroid enlargement/tenderness/mass.    Psych: Oriented x3,  Alert with normal mood and affect.     Respiration/Chest:  Symmetric expansion during respiration, normal respiratory effort.    Skin:  Warm and intact. No ulcerations of face, scalp, neck.    Assessment & Plan:   Erendira was seen today for other.    Diagnoses and all orders for this visit:    Chronic pansinusitis    Multiple nasal polyps    Perennial allergic rhinitis, unspecified allergic rhinitis trigger    Other orders  -     montelukast (SINGULAIR) 10 mg tablet; Take 1 tablet (10 mg total) by mouth once daily.    -SINUSITIS/RHINITIS  Erendira presents today for follow up evaluation.  They have multiple sinonasal complaints and determination of the underlying etiology is a problem of moderate to high complexity.  Patients may present with sinonasal symptoms such as nasal obstruction as a primary anatomic disorder.  Patients may also present with recurrent or chronic inflammatory sinonasal symptoms.  Generally, patients can be " stratified into one of several groups.      The first group represents patients who have frequent or recurrent upper respiratory infections, most frequently viral.  These patients most frequently have normally functioning immune system's but have high levels of exposure.  This is commonly seen in nurses and schoolteachers as well as other groups who spend large amounts of time around 6 patient's and children.      Other patients may have isolated rhinitis without evidence of sinusitis.  The majority of these patients have ALLERGIC rhinitis however other groups may have more rare forms of rhinitis such as nonallergic rhinitis with eosinophilia syndrome.  As a screening evaluation, if the patient has had a normal endoscopy, from time to time a simple x-ray of the sinuses may be performed in combination with antigen specific ALLERGY testing.    The third group of patients present with significant evidence of chronic sinusitis.  Nasal endoscopy may reveal polyps or purulent drainage.  CT scan is an important aspect to determining the extent of disease.  Some patients with chronic sinusitis have an underlying etiology of ALLERGY leading to obstruction of the ostiomeatal units with furthering of the infection.  Others may have an acute infection that leads to stenosis of the sinonasal openings followed by a long, progressive course of infection.  Patients with unilateral disease who do not have inverting papilloma typically fall into this latter group.    CT scan of the sinuses has not been performed.      Antigen specific allergy testing  has been performed.    Allergy treatment with topical steroids and/or antihistamines has used with good compliance with symptoms improving.    By review of all data, history and exam, there does not seem to be a clear distinction between allergic rhinitis versus rhinitis with a component of chronic sinusitis.  This seems to be primarily an ALLERGIC issue     My recommendation is  continue Astelin BID, Nasonex BID, Xyzal QHS, Singulair QAM        We discussed his medical conditions, treatments and plan.  Erendira should return to clinic if any issues arise (symptoms worsen or persist), otherwise we will see him back in the clinic In 12 weeks.    Thank you for allowing me to participate in the care of Erendira.    Alejandro Parkinson MD

## 2017-02-27 ENCOUNTER — OFFICE VISIT (OUTPATIENT)
Dept: PULMONOLOGY | Facility: CLINIC | Age: 72
End: 2017-02-27
Payer: MEDICARE

## 2017-02-27 ENCOUNTER — TELEPHONE (OUTPATIENT)
Dept: DIABETES | Facility: CLINIC | Age: 72
End: 2017-02-27

## 2017-02-27 ENCOUNTER — HOSPITAL ENCOUNTER (OUTPATIENT)
Dept: RADIOLOGY | Facility: HOSPITAL | Age: 72
Discharge: HOME | End: 2017-02-27
Attending: INTERNAL MEDICINE
Payer: MEDICARE

## 2017-02-27 ENCOUNTER — PROCEDURE VISIT (OUTPATIENT)
Dept: PULMONOLOGY | Facility: CLINIC | Age: 72
End: 2017-02-27
Payer: MEDICARE

## 2017-02-27 VITALS
DIASTOLIC BLOOD PRESSURE: 70 MMHG | SYSTOLIC BLOOD PRESSURE: 132 MMHG | HEART RATE: 81 BPM | WEIGHT: 246.88 LBS | OXYGEN SATURATION: 95 % | HEIGHT: 68 IN | BODY MASS INDEX: 37.42 KG/M2

## 2017-02-27 DIAGNOSIS — J44.89 ASTHMA WITH COPD: ICD-10-CM

## 2017-02-27 DIAGNOSIS — J30.2 SEASONAL ALLERGIC RHINITIS, UNSPECIFIED ALLERGIC RHINITIS TRIGGER: ICD-10-CM

## 2017-02-27 DIAGNOSIS — J44.9 CHRONIC OBSTRUCTIVE PULMONARY DISEASE, UNSPECIFIED COPD TYPE: ICD-10-CM

## 2017-02-27 DIAGNOSIS — J44.1 COPD EXACERBATION: Primary | ICD-10-CM

## 2017-02-27 PROCEDURE — 96372 THER/PROPH/DIAG INJ SC/IM: CPT | Mod: S$GLB,,, | Performed by: INTERNAL MEDICINE

## 2017-02-27 PROCEDURE — 94060 EVALUATION OF WHEEZING: CPT | Mod: S$GLB,,, | Performed by: INTERNAL MEDICINE

## 2017-02-27 PROCEDURE — 71020 XR CHEST PA AND LATERAL: CPT | Mod: 26,,, | Performed by: RADIOLOGY

## 2017-02-27 PROCEDURE — 99215 OFFICE O/P EST HI 40 MIN: CPT | Mod: 25,S$GLB,, | Performed by: INTERNAL MEDICINE

## 2017-02-27 PROCEDURE — 99999 PR PBB SHADOW E&M-EST. PATIENT-LVL IV: CPT | Mod: PBBFAC,,, | Performed by: INTERNAL MEDICINE

## 2017-02-27 RX ORDER — MELOXICAM 15 MG/1
TABLET ORAL
Refills: 0 | COMMUNITY
Start: 2017-02-06 | End: 2017-06-08

## 2017-02-27 RX ORDER — MONTELUKAST SODIUM 10 MG/1
10 TABLET ORAL DAILY
Qty: 30 TABLET | Refills: 11 | Status: SHIPPED | OUTPATIENT
Start: 2017-02-27 | End: 2018-03-09 | Stop reason: SDUPTHER

## 2017-02-27 RX ORDER — METHYLPREDNISOLONE 4 MG/1
TABLET ORAL
Qty: 1 PACKAGE | Refills: 1 | Status: SHIPPED | OUTPATIENT
Start: 2017-02-27 | End: 2017-03-20

## 2017-02-27 RX ORDER — TRIAMCINOLONE ACETONIDE 40 MG/ML
80 INJECTION, SUSPENSION INTRA-ARTICULAR; INTRAMUSCULAR
Status: COMPLETED | OUTPATIENT
Start: 2017-02-27 | End: 2017-02-27

## 2017-02-27 RX ORDER — ALBUTEROL SULFATE 90 UG/1
2 AEROSOL, METERED RESPIRATORY (INHALATION) EVERY 6 HOURS
Qty: 1 INHALER | Refills: 12 | Status: SHIPPED | OUTPATIENT
Start: 2017-02-27 | End: 2018-09-11 | Stop reason: SDUPTHER

## 2017-02-27 RX ORDER — MOMETASONE FUROATE 50 UG/1
2 SPRAY, METERED NASAL DAILY
Qty: 17 G | Refills: 12 | Status: SHIPPED | OUTPATIENT
Start: 2017-02-27 | End: 2017-09-18 | Stop reason: SDUPTHER

## 2017-02-27 RX ORDER — AZITHROMYCIN 250 MG/1
250 TABLET, FILM COATED ORAL DAILY
Qty: 6 TABLET | Refills: 1 | Status: SHIPPED | OUTPATIENT
Start: 2017-02-27 | End: 2017-04-03 | Stop reason: ALTCHOICE

## 2017-02-27 RX ORDER — BUDESONIDE AND FORMOTEROL FUMARATE DIHYDRATE 160; 4.5 UG/1; UG/1
2 AEROSOL RESPIRATORY (INHALATION) 2 TIMES DAILY
Qty: 1 INHALER | Refills: 11 | Status: SHIPPED | OUTPATIENT
Start: 2017-02-27 | End: 2017-09-06 | Stop reason: ALTCHOICE

## 2017-02-27 RX ADMIN — TRIAMCINOLONE ACETONIDE 80 MG: 40 INJECTION, SUSPENSION INTRA-ARTICULAR; INTRAMUSCULAR at 02:02

## 2017-02-27 NOTE — MR AVS SNAPSHOT
[facility]  [address] [facility] [place] [address]  Phone: [phone]  Fax: [phone]                  Erendira Pretty   2017 1:40 PM   Office Visit    Description:  Male : 1945   Provider:  Jose Wren MD   Department:  [facility]           Reason for Visit     COPD           Diagnoses this Visit        Comments    COPD exacerbation    -  Primary     Asthma with COPD         Seasonal allergic rhinitis, unspecified allergic rhinitis trigger                To Do List           Future Appointments        Provider Department Dept Phone    3/14/2017 9:50 AM LABORATORY, [facility] [phone]    2017 7:00 AM [facility] [phone]    2017 9:30 AM LABORATORY, [facility] [phone]    2017 8:00 AM Anna Tomas MD [facility] Gastroenterology [phone]    5/15/2017 8:15 AM Alejandro Parkinson MD [facility] [phone]      Goals (5 Years of Data)              Today    17    Plan meals           Notes - Note created  3/31/2016  4:13 PM by Nikhil Watson MD      Tips to Control Acid Reflux  To control acid reflux, youll need to make some basic diet and lifestyle changes. The simple steps outlined below may be all youll need to relieve discomfort.  Watch What You Eat    · Avoid fatty foods and spicy foods.  · Eat fewer acidic foods, such as citrus and tomato-based foods. These can increase symptoms.  · Limit drinking alcohol, caffeine, and fizzy beverages. All increase acid reflux.  · Try limiting chocolate, peppermint, and spearmint. These can worsen acid reflux in some people.  Watch When You Eat  · Avoid lying down for 3 hours after eating.  · Do not snack before going to bed.  Raise Your Head    Raising your head and upper body by 4 inches to 6 inches helps limit reflux when youre lying down. Put blocks under the head of the bed frame to raise it.    ©  7282-3202 [email or web address]. [address] [place] [address], [place] [address]. All rights reserved. This information is not intended as a substitute for professional medical care. Always follow your healthcare professional's instructions.      GERD (Adult)    The esophagus is a tube that carries food from the mouth to the stomach. A valve at the lower end of the esophagus prevents stomach acid from flowing upward. If this valve does not work properly, acid from the stomach enters the esophagus. If this occurs over and over, the acid will injure the lining of the esophagus.  This condition is called GERD (gastroesophageal reflux disease) or acid reflux. When stomach acid flows upward into the esophagus, it causes burning, pressure or sharp pain in the upper abdomen or mid to lower chest. The pain can spread to the neck, back, or shoulder, similar to heart pain (angina). There may be belching, an acid taste in the back of the throat, chronic cough, or sore throat or hoarseness. GERD symptoms often occur during the day after a big meal, but it can also occur at night when lying down. Smoking, as well as drinking alcohol, increases the risk of GERD.  GERD is a chronic condition. Once it begins, it is often lifelong. Treatment includes changes in eating habits and the use of acid blocker medications to decrease the amount of acid in the stomach.  Symptoms often improve with treatment, but if treatment is stopped, the symptoms usually return after a few months. So most persons with GERD will need to continue treatment.  Home Care:  · Take the prescribed acid blocker medication for the full course of treatment even if you begin to feel better sooner. This medication can take up to several days to fully control your symptoms. If you cant afford the prescribed medication, you can try over-the-counter acid blockers, such as Pepcid AC, Tagamet, Zantac, or Aciphex. If these do not relieve your symptoms, a stronger  "acid-blocker can be tried, such as Prilosec OTC.  · You can use antacids, such as Tums, Rolaids, Mylanta, or Maalox, for pain. This will be useful the first few days after starting acid blockers when the blockers havent started working yet. Follow the directions on the label. Liquid antacids may work better than tablets. Note that antacids can interfere with absorption of certain medications. Specifically, do not take Tagamet (cimetidine), Zantac (ranitidine), or Carafate (sucralfate) within 1 hour of taking an antacid. Talk with your pharmacist if you have any questions.  · Limit or avoid fatty, fried, and spicy foods, as well as coffee, chocolate, mint, and foods with high acid content such as tomatoes and citrus fruit and juices (orange, grapefruit, lemon).  · Avoid alcohol and smoking.  · Dont eat large meals, especially at night. Frequent, smaller meals are best. Do not lie down right after eating. And dont eat anything 3 hours before going to bed.  · If you are overweight, losing weight will reduce symptoms. Women should not wear corsets or girdles because this increases pressure on the stomach and worsens reflux.  · If your symptoms occur during sleep, use a foam wedge to elevate your upper body (not just your head.) Or, place 4" blocks under the head of your bed.  Follow Up  with your doctor or as advised by our staff. Further testing may be needed. If you do not begin to improve over the next 4 days, contact your doctor. If you had an x-ray, CT scan, or ECG (electrocardiogram), it will be reviewed by a specialist. Youll be notified of any new findings that affect your care.  Get Prompt Medical Attention  if any of the following occur:  · Stomach pain gets worse or moves to the lower right abdomen (appendix area)  · Chest pain appears or gets worse, or spreads to the back, neck, shoulder, or arm  · Frequent vomiting (cant keep down liquids)  · Blood in the stool or vomit (red or black in " color)  · Feeling weak or dizzy, fainting, or trouble breathing  · Fever of 100.4ºF (38ºC) or higher, or as directed by your healthcare provider  © 5121-7601 [employer]. [address] [place] [address], [place] [address]. All rights reserved. This information is not intended as a substitute for professional medical care. Always follow your healthcare professional's instructions.              Weight < 90.719 kg (200 lb)   112 kg (246 lb 14.4 oz)  113.2 kg (249 lb 9 oz)  110 kg (242 lb 8.1 oz)    Notes - Note created  3/31/2016  4:12 PM by Nikhil Watson MD      Diabetes: Understanding Carbohydrates, Fats, and Protein  Food is a source of fuel and nourishment for your body. Its also a source of pleasure. Having diabetes doesnt mean you have to eat special foods or give up desserts. Instead, your dietitian can show you how to plan meals to suit your body. To start, learn how different foods affect blood sugar.    Carbohydrates  Carbohydrates are the main source of fuel for the body. Carbohydrates raise blood sugar. Many people think carbohydrates are only found in pasta or bread. But carbohydrates are actually in many kinds of foods:  · Sugars occur naturally in foods such as fruit, milk, honey, and molasses. Sugars can also be added to many foods, from cereals and yogurt to candy and desserts. Sugars raise blood sugar.  · Starches are found in bread, cereals, pasta, and dried beans. Theyre also found in corn, peas, potatoes, yam, acorn squash, and butternut squash. Starches also raise blood sugar.   · Fiber is found in foods such as vegetables, fruits, and whole grains. Unlike other carbs, fiber isnt digested or absorbed. So it doesnt raise blood sugar. In fact, fiber can help keep blood sugar from rising too fast. It also helps keep blood cholesterol at a healthy level.     Did You Know?  Even though carbohydrates raise blood sugar, its best to have some in every meal. They are an important part of a  healthy diet.   Fat  Fat is an energy source that can be stored until needed. Fat does not raise blood sugar. However, it can raise blood cholesterol, increasing the risk of heart disease. Fat is also high in calories, which can cause weight gain. Not all types of fat are the same.  More Healthy:  · Monounsaturated fats are mostly found in vegetable oils, such as olive, canola, and peanut oils. They are also found in avocados and some nuts. Monounsaturated fats are healthy for your heart. Thats because they lower LDL (unhealthy) cholesterol.  · Polyunsaturated fats are mostly found in vegetable oils, such as corn, safflower, and soybean oils. They are also found in some seeds, nuts, and fish. Polyunsaturated fats lower LDL (unhealthy) cholesterol. So, choosing them instead of saturated fats is healthy for your heart. Certain unsaturated fats can help lower triglycerides.   Less Healthy:  · Saturated fats are found in animal products, such as meat, poultry, whole milk, lard, and butter. Saturated fats raise LDL cholesterol and are not healthy for your heart.  · Hydrogenated oils and trans fats are formed when vegetable oils are processed into solid fats. They are found in many processed foods. Hydrogenated oils and trans fats raise LDL cholesterol and lower HDL (healthy) cholesterol. They are not healthy for your heart.  Protein  Protein helps the body build and repair muscle and other tissue. Protein has little or no effect on blood sugar. However, many foods that contain protein also contain saturated fat. By choosing low-fat protein sources, you can get the benefits of protein without the extra fat:  · Plant protein is found in dry beans and peas, nuts, and soy products, such as tofu and soymilk. These sources tend to be cholesterol-free and low in saturated fat.  · Animal protein is found in fish, poultry, meat, cheese, milk, and eggs. These contain cholesterol and can be high in saturated fat. Aim for lean,  lower-fat choices.  © 6374-7318 [employer]. [address] [place] [address], [place] [address]. All rights reserved. This information is not intended as a substitute for professional medical care. Always follow your healthcare professional's instructions.        Healthy Meals for Diabetes  Ask your health care team to help you make a meal plan that fits your needs. Your meal plan tells you when to eat your meals and snacks, what kinds of foods to eat, and how much of each food to eat. You dont have to give up all the foods you like. But you do need to follow some guidelines.  Eat foods rich in fiber  Fiber is a carbohydrate that breaks down slowly. Fiber is also healthy for your heart. Fiber-rich foods include:        Choose healthy protein foods  Eating protein that is low in fat can help you control your weight. It also helps keep your heart healthy. Low-fat protein foods include:  · Fish  · Plant proteins, such as dry beans and peas, nuts, and soy products like tofu and soymilk  · Lean meat with all visible fat removed  · Poultry with the skin removed  · Low-fat or nonfat milk, cheese, and yogurt  Limit unhealthy fats and sugar  Saturated and trans fats are unhealthy for your heart. They raise LDL (bad) cholesterol. Fat is also high in calories, so it can make you gain weight. To cut down on unhealthy fats and sugar, limit these foods:        How much to eat  The amount of food you eat affects your blood sugar. It also affects your weight. Your health care team will tell you how much of each type of food you should eat.  · Use measuring cups and spoons and a food scale to measure serving sizes.  · Learn what a correct serving size looks like on your plate. This will help when you are away from home and cant measure your servings.  · Eat only the number of servings given on your meal plan for each food. Dont take seconds.  When to eat  Your meal plan will likely include breakfast, lunch, dinner, and some  snacks.  · Try to eat your meals and snacks at about the same times each day.  · Eat all your meals and snacks. Skipping a meal or snack can make your blood sugar drop too low. It can also cause you to eat too much at the next meal or snack. Then your blood sugar could get too high.  © 8656-4858 [employer]. [address] [place] [address], [place] [address]. All rights reserved. This information is not intended as a substitute for professional medical care. Always follow your healthcare professional's instructions.      Metabolic Syndrome: Losing Excess Weight  Metabolic syndrome is a set of 5 health factors that can lead to serious health problems. The factors greatly increase your risk for diabetes, heart attack, or stroke. Extra weight with a large waist is one of the factors for metabolic syndrome. Being overweight or obese means that you weigh too much for what is healthy for your height. A large waist size is 40 inches or more for men, and 35 inches or more for women.  But you can take steps to lose weight and lower your risk for serious health problems.  Benefits of weight loss  Even with a small weight loss, you may have more energy and feel better. Losing even a small amount of weight can affect your blood pressure, triglycerides, HDL cholesterol, and blood sugar. You may be able to take less medicine for blood pressure, cholesterol, or blood sugar. Or you may be able to stop taking medicine. As you lose weight, your risk for diabetes, heart attack, and stroke will get lower.  Getting started  The best way to lose weight is to do it gradually. For example, lose 1/2 to 1 pound a week. You will need to be more active and eat healthier foods. Make sure to:  · Exercise every day. Talk with your health care provider to make sure it is safe for you to exercise. Make sure you start slowly. Begin with 10 to 15 minutes of activity. Try to exercise or be active for at least 30 minutes most days of the week. You  can exercise all at once or break it up into 10- or 15-minute sessions. And think about other ways you can be more active throughout the day.  · Eat healthy foods. Most successful dieters make changes in what, when, and how much they eat. The best way to lose weight is to eat fewer calories. You should make sure you check your portion sizes, eat breakfast, plan your meals and snacks, and eat slowly.  Working with your health care provider  Talk with your health care provider. He or she can guide you through the process of losing weight. As you begin to make changes, your health care provider will:  · Check your weight loss progress  · Check your blood pressure and blood test results  · Talk with you about your results  · Make suggestions about diet and exercise  · Recommend other experts or programs  · Make changes to your medicines and help with any side effects  Getting additional support  It can be hard to make healthy lifestyle changes. It may take some time to create new habits.  Your health care provider may suggest other experts or programs to help you, such as:  · Health . A health  gives ongoing support and makes suggestions to help you with healthy lifestyle changes, like weight loss.  · Weight loss programs. There are many safe weight loss programs. Some are free or low-cost.  · Dietician. He or she can help you make changes to your diet.  · Exercise specialist. He or she can help you with an exercise plan.  · Counselor. A counselor can help you deal with your feelings and emotions. There are psychiatrists, psychologists, and social workers who specialize in weight problems.  · Bariatric or obesity specialist.  These health care providers are experts in obesity. They can help with diet, exercise, behavioral therapy or counseling, medicines for weight loss, and very low-calorie diets.  · Bariatric surgeon. Weight loss surgery may be a choice. But it is only advised for people who are over a  certain weight, who have health problems because of their weight, and who have not been able to lose weight with other treatments.  Keeping the weight off  After losing weight, keeping it off can be even harder. Dont give up. Make sure to:  · Keep exercising. That means at least 40 to 60 minutes most days of the week.  · Keep eating healthy foods. Continue eating foods that are healthy and avoiding those that arent.  · Stay motivated. Watch your health improve. If you eat something unhealthy or skip exercising, dont give up. Simply make your next choice a healthy one.  © 6582-7458 [employer]. [address] [place] [address], [place] [address]. All rights reserved. This information is not intended as a substitute for professional medical care. Always follow your healthcare professional's instructions.                Follow-Up and Disposition     Return in about 6 months (around 9/4/2017) for terry.       These Medications        Disp Refills Start End    azithromycin (ZITHROMAX Z-GADIEL) 250 MG tablet 6 tablet 1 2/27/2017     Take 1 tablet (250 mg total) by mouth once daily. 500 mg on day 1 (two tablets) followed by 250 mg once daily on days 2-5 - Oral    Pharmacy: [facility] [place] [address] Ph #: [phone]       methylPREDNISolone (MEDROL DOSEPACK) 4 mg tablet 1 Package 1 2/27/2017 3/20/2017    use as directed    Pharmacy: [facility] [address] [place] [address] Ph #: [phone]       budesonide-formoterol 160-4.5 mcg (SYMBICORT) 160-4.5 mcg/actuation HFAA 1 Inhaler 11 2/27/2017     Inhale 2 puffs into the lungs 2 (two) times daily. - Inhalation    Pharmacy: [facility] [address] [place] [address] Ph #: [phone]       montelukast (SINGULAIR) 10 mg tablet 30 tablet 11 2/27/2017 3/29/2017    Take 1 tablet (10 mg total) by mouth once daily. - Oral    Pharmacy: [facility] [address] [place] [address] Ph #: [phone]       mometasone  (NASONEX) 50 mcg/actuation nasal spray 17 g 12 2/27/2017     2 sprays by Nasal route once daily. - Nasal    Pharmacy: [facility] [place] [place] - [address] Ph #: [phone]       albuterol 90 mcg/actuation inhaler 1 Inhaler 12 2/27/2017 2/27/2018    Inhale 2 puffs into the lungs every 6 (six) hours. - Inhalation    Pharmacy: [facility] [place] - [address] Ph #: [phone]         [facility]     [facility] On Call Nurse Care Line - 24/7 Assistance  Registered nurses in the [facility] On Call Center provide clinical advisement, health education, appointment booking, and other advisory services.  Call for this free service at [phone].             Medications           Message regarding Medications     Verify the changes and/or additions to your medication regime listed below are the same as discussed with your clinician today.  If any of these changes or additions are incorrect, please notify your healthcare provider.        START taking these NEW medications        Refills    azithromycin (ZITHROMAX Z-GADIEL) 250 MG tablet 1    Sig: Take 1 tablet (250 mg total) by mouth once daily. 500 mg on day 1 (two tablets) followed by 250 mg once daily on days 2-5    Class: Normal    Route: Oral    methylPREDNISolone (MEDROL DOSEPACK) 4 mg tablet 1    Sig: use as directed    Class: Normal    albuterol 90 mcg/actuation inhaler 12    Sig: Inhale 2 puffs into the lungs every 6 (six) hours.    Class: Normal    Route: Inhalation      These medications were administered today        Dose Freq    triamcinolone acetonide injection 80 mg 80 mg Clinic/hospitals 1 time    Sig: Inject 2 mLs (80 mg total) into the muscle one time.    Class: Normal    Route: Intramuscular      CHANGE how you are taking these medications     Start Taking Instead of    budesonide-formoterol 160-4.5 mcg (SYMBICORT) 160-4.5 mcg/actuation HFAA budesonide-formoterol 160-4.5 mcg (SYMBICORT) 160-4.5 mcg/actuation HFAA    Dosage:   "Inhale 2 puffs into the lungs 2 (two) times daily. Dosage:  Inhale 2 puffs into the lungs daily as needed.    Reason for Change:  Reorder            Verify that the below list of medications is an accurate representation of the medications you are currently taking.  If none reported, the list may be blank. If incorrect, please contact your healthcare provider. Carry this list with you in case of emergency.           Current Medications     ACIPHEX 20 mg tablet TAKE 1 TABLET BY MOUTH TWO TIMES A DAY    albuterol 90 mcg/actuation inhaler Inhale 2 puffs into the lungs every 6 (six) hours.    azelastine (ASTELIN) 137 mcg (0.1 %) nasal spray 2 sprays (274 mcg total) by Nasal route 2 (two) times daily.    azithromycin (ZITHROMAX Z-GADIEL) 250 MG tablet Take 1 tablet (250 mg total) by mouth once daily. 500 mg on day 1 (two tablets) followed by 250 mg once daily on days 2-5    BD INSULIN PEN NEEDLE UF MINI 31 x 3/16 " Ndle USE AS DIRECTED    blood sugar diagnostic (BLOOD GLUCOSE TEST) Strp 1 strip by Misc.(Non-Drug; Combo Route) route 3 (three) times daily. accu-chek lux plus    budesonide-formoterol 160-4.5 mcg (SYMBICORT) 160-4.5 mcg/actuation HFAA Inhale 2 puffs into the lungs 2 (two) times daily.    cycloSPORINE (RESTASIS) 0.05 % ophthalmic emulsion Place 0.4 mLs (1 drop total) into both eyes 2 (two) times daily.    GLUCOSAMINE HCL/CHONDR ROSARIO A NA (OSTEO BI-FLEX ORAL) Take 1 tablet by mouth once daily.    HUMALOG KWIKPEN 100 unit/mL InPn pen INJECT 3-4 UNITS INTO THE SKIN THREE TIMES DAILY BEFORE MEALS.    insulin glargine (LANTUS SOLOSTAR) 100 unit/mL (3 mL) InPn pen Inject 40 Units into the skin once daily.    lancets (ACCU-CHEK FASTCLIX) Misc Test 2-3 times daily    levocetirizine (XYZAL) 5 MG tablet Take 1 tablet (5 mg total) by mouth every evening.    levothyroxine (SYNTHROID) 137 MCG Tab tablet Take 2 tablets (274 mcg total) by mouth before breakfast.    meloxicam (MOBIC) 15 MG tablet     methylPREDNISolone (MEDROL " DOSEPACK) 4 mg tablet use as directed    metoprolol succinate (TOPROL-XL) 50 MG 24 hr tablet TAKE ONE BY MOUTH TWO TIMES A DAY.    mometasone (NASONEX) 50 mcg/actuation nasal spray 2 sprays by Nasal route once daily.    montelukast (SINGULAIR) 10 mg tablet Take 1 tablet (10 mg total) by mouth once daily.    MV,CA,IRON,MIN/FA/PHYTOSTEROL (CENTRUM SPECIALIST HEART ORAL) Take 1 tablet by mouth 2 (two) times daily.    niacin, inositol niacinate, (NIACIN FLUSH FREE) 400 mg niacin (500 mg) Cap Take 500 mg by mouth every evening.    OMEGA-3 FATTY ACIDS/DHA/EPA (MEGARED PLANT-OMEGA-3 ORAL) Take 1 capsule by mouth once daily.    psyllium (METAMUCIL) 0.52 gram capsule Take by mouth.    RESTASIS 0.05 % ophthalmic emulsion PLACE 1 DROP IN BOTH EYES TWO TIMES A DAY    saw palmetto 80 MG capsule Take 80 mg by mouth 2 (two) times daily.    TWYNSTA 80-10 mg Tab TAKE ONE BY MOUTH EVERY DAY.           Clinical Reference Information           Your Vitals Were     BP                   132/70           Blood Pressure          Most Recent Value    BP  132/70      Allergies as of 2/27/2017     Iodinated Contrast Media - Iv Dye    Omeprazole    Prilosec [Omeprazole Magnesium]      Immunizations Administered on Date of Encounter - 2/27/2017     None      Orders Placed During Today's Visit     Future Labs/Procedures Expected by Expires    Spirometry with/without bronchodilator  8/30/2017 (Approximate) 2/27/2018      [facility] Sign-Up     Activating your [facility] account is as easy as 1-2-3!     1) Visit [email or web address], select Sign Up Now, enter this activation code and your date of birth, then select Next.  Activation code not generated  Current Patient Portal Status: Account disabled      2) Create a username and password to use when you visit [facility] in the future and select a security question in case you lose your password and select Next.    3) Enter your e-mail address and click Sign Up!    Additional Information  If you have  questions, please e-mail [email or web address] or call [phone] to talk to our [facility] staff. Remember, [facility] is NOT to be used for urgent needs. For medical emergencies, dial 911.         Instructions      Inhaler Use  The inhaler that you were prescribed contains a potent medicine. It should only be used as directed. The medicine in your inhaler must be breathed deeply into your lungs for it to work. It will not work at all if it only reaches your mouth and throat. Follow the instructions below for best results. And remember to follow your asthma action plan as given to you by your doctor.  1. Keep your inhaler at room temperature.  2. Hold the inhaler so that the part that goes into your mouth is at the bottom.  3. Shake the inhaler well and remove the cap.  4. Breathe out through your mouth to fully empty your lungs.  5. Place the inhaler in your mouth and close your lips tightly around it. (Or hold the inhaler 1 to 2 inches from your open mouth if told to do so by your healthcare provider.)  6. Squeeze the inhaler as you breathe in slowly through your mouth until your lungs are full of air, drawing the medicine deep into your lungs.  7. Hold your breath for 10 seconds, or as long as you can comfortably hold your breath. Then breathe out slowly.  8. If you have been advised to take 2 puffs, wait 5 minutes, then repeat steps 3-7 above. Waiting 5 minutes between puffs will alow the medicine to open up your lungs so the second puff can get deeper into the lungs. Replace the cap when done.  9. If you were prescribed both a steroid inhaler and a bronchodilator inhaler, use the bronchodilator first to open the air passages. Wait 5 minutes, then use the steroid inhaler.  10. Rinse your mouth with water and spit it out (especially after using a steroid inhaler). This is very important if you are using a steroid inhaler to prevent thrush, a mild yeast infection of the mouth and back of the throat.  11. A  special chamber (spacer) may be prescribed that attaches to your inhaler. This increases the amount of medicine that goes to your lungs. It also improves how well each treatment works. Ask your doctor about this if you did not receive one.    Keep it clean  Remove the metal canister and do not immerse it in water. Then clean the plastic mouthpiece, cap, and spacer if you have one, by rinsing them well in warm running water for 30 to 60 seconds. Shake off excess water and allow the mouthpiece to dry completely (overnight is recommended). This should be done once a week. If you need the inhaler before the mouthpiece is dry, shake off excess water, replace canister, and test spray 2 times (away from the face).  Warning  A steroid inhaler is used to prevent an asthma attack. Do not use this to treat an acute wheezing episode. Use only bronchodilator inhalers (quick relief) to treat an acute asthma attack.  If you find that your medicine is not working and you need to use it more often than prescribed, this could be a sign that your asthma is getting worse. Go to the emergency room or urgent care right away. An asthma attack is easiest to treat in the early stages before it becomes severe.  When to seek medical advice  Get prompt medical attention if any of the following occur:  · Increased wheezing or shortness of breath  · Need to use your inhalers more often than usual without relief  · Fever of 100.4°F (38°C) or higher, or as directed by your healthcare provider  · Coughing up lots of dark-colored or bloody sputum (mucus)  · Chest pain with each breath  · Blue lips or fingernails  · Peak flow reading less than 50% of your normal best  Date Last Reviewed: 12/2/2015  © 0341-6914 [employer]. [address] [place] [address], [place] [address]. All rights reserved. This information is not intended as a substitute for professional medical care. Always follow your healthcare professional's  instructions.        Step-by-Step:  Using a Steroid Inhaler    Date Last Reviewed: 10/1/2016  © 4864-4123 [employer]. [address] [place] [address], [place] [address]. All rights reserved. This information is not intended as a substitute for professional medical care. Always follow your healthcare professional's instructions.        Methylprednisolone tablets  What is this medicine?  METHYLPREDNISOLONE (meth ill pred NISS oh lone) is a corticosteroid. It is commonly used to treat inflammation of the skin, joints, lungs, and other organs. Common conditions treated include asthma, allergies, and arthritis. It is also used for other conditions, such as blood disorders and diseases of the adrenal glands.  How should I use this medicine?  Take this medicine by mouth with a drink of water. Follow the directions on the prescription label. Take it with food or milk to avoid stomach upset. If you are taking this medicine once a day, take it in the morning. Do not take more medicine than you are told to take. Do not suddenly stop taking your medicine because you may develop a severe reaction. Your doctor will tell you how much medicine to take. If your doctor wants you to stop the medicine, the dose may be slowly lowered over time to avoid any side effects.  Talk to your pediatrician regarding the use of this medicine in children. Special care may be needed.  What side effects may I notice from receiving this medicine?  Side effects that you should report to your doctor or health care professional as soon as possible:  · allergic reactions like skin rash, itching or hives, swelling of the face, lips, or tongue  · eye pain, decreased or blurred vision, or bulging eyes  · fever, sore throat, sneezing, cough, or other signs of infection, wounds that will not heal  · increased thirst  · mental depression, mood swings, mistaken feelings of self importance or of being mistreated  · pain in hips, back, ribs, arms, shoulders,  or legs  · swelling of the ankles, feet, hands  · trouble passing urine or change in the amount of urine  Side effects that usually do not require medical attention (report to your doctor or health care professional if they continue or are bothersome):  · confusion, excitement, restlessness  · headache  · nausea, vomiting  · skin problems, acne, thin and shiny skin  · weight gain  What may interact with this medicine?  Do not take this medicine with any of the following medications:  · mifepristone  This medicine may also interact with the following medications:  · tacrolimus  · vaccines  · warfarin  What if I miss a dose?  If you miss a dose, take it as soon as you can. If it is almost time for your next dose, talk to your doctor or health care professional. You may need to miss a dose or take an extra dose. Do not take double or extra doses without advice.  Where should I keep my medicine?  Keep out of the reach of children.  Store at room temperature between 20 and 25 degrees C (68 and 77 degrees F). Throw away any unused medicine after the expiration date.  What should I tell my health care provider before I take this medicine?  They need to know if you have any of these conditions:  · Cushing's syndrome  · diabetes  · glaucoma  · heart problems or disease  · high blood pressure  · infection such as herpes, measles, tuberculosis, or chickenpox  · kidney disease  · liver disease  · mental problems  · myasthenia gravis  · osteoporosis  · seizures  · stomach ulcer or intestine disease including colitis and diverticulitis  · thyroid problem  · an unusual or allergic reaction to lactose, methylprednisolone, other medicines, foods, dyes, or preservatives  · pregnant or trying to get pregnant  · breast-feeding  What should I watch for while using this medicine?  Visit your doctor or health care professional for regular checks on your progress. If you are taking this medicine for a long time, carry an identification  card with your name and address, the type and dose of your medicine, and your doctor's name and address.  The medicine may increase your risk of getting an infection. Stay away from people who are sick. Tell your doctor or health care professional if you are around anyone with measles or chickenpox.  If you are going to have surgery, tell your doctor or health care professional that you have taken this medicine within the last twelve months.  Ask your doctor or health care professional about your diet. You may need to lower the amount of salt you eat.  The medicine can increase your blood sugar. If you are a diabetic check with your doctor if you need help adjusting the dose of your diabetic medicine.  Date Last Reviewed:   NOTE:This sheet is a summary. It may not cover all possible information. If you have questions about this medicine, talk to your doctor, pharmacist, or health care provider. Copyright© 2016 [employer]        Azithromycin tablets  What is this medicine?  AZITHROMYCIN (az ith kaleigh MYE sin) is a macrolide antibiotic. It is used to treat or prevent certain kinds of bacterial infections. It will not work for colds, flu, or other viral infections.  How should I use this medicine?  Take this medicine by mouth with a full glass of water. Follow the directions on the prescription label. The tablets can be taken with food or on an empty stomach. If the medicine upsets your stomach, take it with food. Take your medicine at regular intervals. Do not take your medicine more often than directed. Take all of your medicine as directed even if you think your are better. Do not skip doses or stop your medicine early. Talk to your pediatrician regarding the use of this medicine in children. Special care may be needed.  What side effects may I notice from receiving this medicine?  Side effects that you should report to your doctor or health care professional as soon as possible:  · allergic reactions like skin  rash, itching or hives, swelling of the face, lips, or tongue  · confusion, nightmares or hallucinations  · dark urine  · difficulty breathing  · hearing loss  · irregular heartbeat or chest pain  · pain or difficulty passing urine  · redness, blistering, peeling or loosening of the skin, including inside the mouth  · white patches or sores in the mouth  · yellowing of the eyes or skin  Side effects that usually do not require medical attention (report to your doctor or health care professional if they continue or are bothersome):  · diarrhea  · dizziness, drowsiness  · headache  · stomach upset or vomiting  · tooth discoloration  · vaginal irritation  What may interact with this medicine?  Do not take this medicine with any of the following medications:  · lincomycin  This medicine may also interact with the following medications:  · amiodarone  · antacids  · birth control pills  · cyclosporine  · digoxin  · magnesium  · nelfinavir  · phenytoin  · warfarin  What if I miss a dose?  If you miss a dose, take it as soon as you can. If it is almost time for your next dose, take only that dose. Do not take double or extra doses.  Where should I keep my medicine?  Keep out of the reach of children.  Store at room temperature between 15 and 30 degrees C (59 and 86 degrees F). Throw away any unused medicine after the expiration date.  What should I tell my health care provider before I take this medicine?  They need to know if you have any of these conditions:  · kidney disease  · liver disease  · irregular heartbeat or heart disease  · an unusual or allergic reaction to azithromycin, erythromycin, other macrolide antibiotics, foods, dyes, or preservatives  · pregnant or trying to get pregnant  · breast-feeding  What should I watch for while using this medicine?  Tell your doctor or health care professional if your symptoms do not improve.  Do not treat diarrhea with over the counter products. Contact your doctor if you  have diarrhea that lasts more than 2 days or if it is severe and watery.  This medicine can make you more sensitive to the sun. Keep out of the sun. If you cannot avoid being in the sun, wear protective clothing and use sunscreen. Do not use sun lamps or tanning beds/booths.  Date Last Reviewed:   NOTE:This sheet is a summary. It may not cover all possible information. If you have questions about this medicine, talk to your doctor, pharmacist, or health care provider. Copyright© 2016 [employer]             Language Assistance Services     ATTENTION: Language assistance services are available, free of charge. Please call [phone].      ATENCIÓN: Si habla baron, tiene a leach disposición servicios gratuitos de asistencia lingüística. Llame al [phone].     CHÚ Ý: N?u b?n nói Ti?ng Vi?t, có các d?ch v? h? tr? ngôn ng? mi?n phí dành cho b?n. G?i s? [phone].         [facility] complies with applicable Federal civil rights laws and does not discriminate on the basis of race, color, national origin, age, disability, or sex.

## 2017-02-27 NOTE — TELEPHONE ENCOUNTER
Patient did not want to schedule a f/u with Diabetes mgmt, stated that Dr. Roblero told him his diabetes was under controll. Informed him that he needs to request all DM meds refills from PCP.

## 2017-02-27 NOTE — PATIENT INSTRUCTIONS
Inhaler Use  The inhaler that you were prescribed contains a potent medicine. It should only be used as directed. The medicine in your inhaler must be breathed deeply into your lungs for it to work. It will not work at all if it only reaches your mouth and throat. Follow the instructions below for best results. And remember to follow your asthma action plan as given to you by your doctor.  1. Keep your inhaler at room temperature.  2. Hold the inhaler so that the part that goes into your mouth is at the bottom.  3. Shake the inhaler well and remove the cap.  4. Breathe out through your mouth to fully empty your lungs.  5. Place the inhaler in your mouth and close your lips tightly around it. (Or hold the inhaler 1 to 2 inches from your open mouth if told to do so by your healthcare provider.)  6. Squeeze the inhaler as you breathe in slowly through your mouth until your lungs are full of air, drawing the medicine deep into your lungs.  7. Hold your breath for 10 seconds, or as long as you can comfortably hold your breath. Then breathe out slowly.  8. If you have been advised to take 2 puffs, wait 5 minutes, then repeat steps 3-7 above. Waiting 5 minutes between puffs will alow the medicine to open up your lungs so the second puff can get deeper into the lungs. Replace the cap when done.  9. If you were prescribed both a steroid inhaler and a bronchodilator inhaler, use the bronchodilator first to open the air passages. Wait 5 minutes, then use the steroid inhaler.  10. Rinse your mouth with water and spit it out (especially after using a steroid inhaler). This is very important if you are using a steroid inhaler to prevent thrush, a mild yeast infection of the mouth and back of the throat.  11. A special chamber (spacer) may be prescribed that attaches to your inhaler. This increases the amount of medicine that goes to your lungs. It also improves how well each treatment works. Ask your doctor about this if you  did not receive one.    Keep it clean  Remove the metal canister and do not immerse it in water. Then clean the plastic mouthpiece, cap, and spacer if you have one, by rinsing them well in warm running water for 30 to 60 seconds. Shake off excess water and allow the mouthpiece to dry completely (overnight is recommended). This should be done once a week. If you need the inhaler before the mouthpiece is dry, shake off excess water, replace canister, and test spray 2 times (away from the face).  Warning  A steroid inhaler is used to prevent an asthma attack. Do not use this to treat an acute wheezing episode. Use only bronchodilator inhalers (quick relief) to treat an acute asthma attack.  If you find that your medicine is not working and you need to use it more often than prescribed, this could be a sign that your asthma is getting worse. Go to the emergency room or urgent care right away. An asthma attack is easiest to treat in the early stages before it becomes severe.  When to seek medical advice  Get prompt medical attention if any of the following occur:  · Increased wheezing or shortness of breath  · Need to use your inhalers more often than usual without relief  · Fever of 100.4°F (38°C) or higher, or as directed by your healthcare provider  · Coughing up lots of dark-colored or bloody sputum (mucus)  · Chest pain with each breath  · Blue lips or fingernails  · Peak flow reading less than 50% of your normal best  Date Last Reviewed: 12/2/2015  © 0934-5632 [employer]. [address] [place] [id number]. All rights reserved. This information is not intended as a substitute for professional medical care. Always follow your healthcare professional's instructions.        Step-by-Step:  Using a Steroid Inhaler    Date Last Reviewed: 10/1/2016  © 3596-6438 [employer]. [address] [place] [id number]. All rights reserved. This information is not intended as a substitute  for professional medical care. Always follow your healthcare professional's instructions.        Methylprednisolone tablets  What is this medicine?  METHYLPREDNISOLONE (meth ill pred NISS oh lone) is a corticosteroid. It is commonly used to treat inflammation of the skin, joints, lungs, and other organs. Common conditions treated include asthma, allergies, and arthritis. It is also used for other conditions, such as blood disorders and diseases of the adrenal glands.  How should I use this medicine?  Take this medicine by mouth with a drink of water. Follow the directions on the prescription label. Take it with food or milk to avoid stomach upset. If you are taking this medicine once a day, take it in the morning. Do not take more medicine than you are told to take. Do not suddenly stop taking your medicine because you may develop a severe reaction. Your doctor will tell you how much medicine to take. If your doctor wants you to stop the medicine, the dose may be slowly lowered over time to avoid any side effects.  Talk to your pediatrician regarding the use of this medicine in children. Special care may be needed.  What side effects may I notice from receiving this medicine?  Side effects that you should report to your doctor or health care professional as soon as possible:  · allergic reactions like skin rash, itching or hives, swelling of the face, lips, or tongue  · eye pain, decreased or blurred vision, or bulging eyes  · fever, sore throat, sneezing, cough, or other signs of infection, wounds that will not heal  · increased thirst  · mental depression, mood swings, mistaken feelings of self importance or of being mistreated  · pain in hips, back, ribs, arms, shoulders, or legs  · swelling of the ankles, feet, hands  · trouble passing urine or change in the amount of urine  Side effects that usually do not require medical attention (report to your doctor or health care professional if they continue or are  bothersome):  · confusion, excitement, restlessness  · headache  · nausea, vomiting  · skin problems, acne, thin and shiny skin  · weight gain  What may interact with this medicine?  Do not take this medicine with any of the following medications:  · mifepristone  This medicine may also interact with the following medications:  · tacrolimus  · vaccines  · warfarin  What if I miss a dose?  If you miss a dose, take it as soon as you can. If it is almost time for your next dose, talk to your doctor or health care professional. You may need to miss a dose or take an extra dose. Do not take double or extra doses without advice.  Where should I keep my medicine?  Keep out of the reach of children.  Store at room temperature between 20 and 25 degrees C (68 and 77 degrees F). Throw away any unused medicine after the expiration date.  What should I tell my health care provider before I take this medicine?  They need to know if you have any of these conditions:  · Cushing's syndrome  · diabetes  · glaucoma  · heart problems or disease  · high blood pressure  · infection such as herpes, measles, tuberculosis, or chickenpox  · kidney disease  · liver disease  · mental problems  · myasthenia gravis  · osteoporosis  · seizures  · stomach ulcer or intestine disease including colitis and diverticulitis  · thyroid problem  · an unusual or allergic reaction to lactose, methylprednisolone, other medicines, foods, dyes, or preservatives  · pregnant or trying to get pregnant  · breast-feeding  What should I watch for while using this medicine?  Visit your doctor or health care professional for regular checks on your progress. If you are taking this medicine for a long time, carry an identification card with your name and address, the type and dose of your medicine, and your doctor's name and address.  The medicine may increase your risk of getting an infection. Stay away from people who are sick. Tell your doctor or health care  professional if you are around anyone with measles or chickenpox.  If you are going to have surgery, tell your doctor or health care professional that you have taken this medicine within the last twelve months.  Ask your doctor or health care professional about your diet. You may need to lower the amount of salt you eat.  The medicine can increase your blood sugar. If you are a diabetic check with your doctor if you need help adjusting the dose of your diabetic medicine.  Date Last Reviewed:   NOTE:This sheet is a summary. It may not cover all possible information. If you have questions about this medicine, talk to your doctor, pharmacist, or health care provider. Copyright© 2016 [employer]        Azithromycin tablets  What is this medicine?  AZITHROMYCIN (az ith kaleigh ADINA sin) is a macrolide antibiotic. It is used to treat or prevent certain kinds of bacterial infections. It will not work for colds, flu, or other viral infections.  How should I use this medicine?  Take this medicine by mouth with a full glass of water. Follow the directions on the prescription label. The tablets can be taken with food or on an empty stomach. If the medicine upsets your stomach, take it with food. Take your medicine at regular intervals. Do not take your medicine more often than directed. Take all of your medicine as directed even if you think your are better. Do not skip doses or stop your medicine early. Talk to your pediatrician regarding the use of this medicine in children. Special care may be needed.  What side effects may I notice from receiving this medicine?  Side effects that you should report to your doctor or health care professional as soon as possible:  · allergic reactions like skin rash, itching or hives, swelling of the face, lips, or tongue  · confusion, nightmares or hallucinations  · dark urine  · difficulty breathing  · hearing loss  · irregular heartbeat or chest pain  · pain or difficulty passing  urine  · redness, blistering, peeling or loosening of the skin, including inside the mouth  · white patches or sores in the mouth  · yellowing of the eyes or skin  Side effects that usually do not require medical attention (report to your doctor or health care professional if they continue or are bothersome):  · diarrhea  · dizziness, drowsiness  · headache  · stomach upset or vomiting  · tooth discoloration  · vaginal irritation  What may interact with this medicine?  Do not take this medicine with any of the following medications:  · lincomycin  This medicine may also interact with the following medications:  · amiodarone  · antacids  · birth control pills  · cyclosporine  · digoxin  · magnesium  · nelfinavir  · phenytoin  · warfarin  What if I miss a dose?  If you miss a dose, take it as soon as you can. If it is almost time for your next dose, take only that dose. Do not take double or extra doses.  Where should I keep my medicine?  Keep out of the reach of children.  Store at room temperature between 15 and 30 degrees C (59 and 86 degrees F). Throw away any unused medicine after the expiration date.  What should I tell my health care provider before I take this medicine?  They need to know if you have any of these conditions:  · kidney disease  · liver disease  · irregular heartbeat or heart disease  · an unusual or allergic reaction to azithromycin, erythromycin, other macrolide antibiotics, foods, dyes, or preservatives  · pregnant or trying to get pregnant  · breast-feeding  What should I watch for while using this medicine?  Tell your doctor or health care professional if your symptoms do not improve.  Do not treat diarrhea with over the counter products. Contact your doctor if you have diarrhea that lasts more than 2 days or if it is severe and watery.  This medicine can make you more sensitive to the sun. Keep out of the sun. If you cannot avoid being in the sun, wear protective clothing and use  sunscreen. Do not use sun lamps or tanning beds/booths.  Date Last Reviewed:   NOTE:This sheet is a summary. It may not cover all possible information. If you have questions about this medicine, talk to your doctor, pharmacist, or health care provider. Copyright© 2016 [employer]

## 2017-02-27 NOTE — PROGRESS NOTES
Subjective:       Patient ID: Erendira Pretty is a 71 y.o. male.    Chief Complaint: COPD    HPI COPD  He presents for evaluation and treatment of COPD. The patient is currently having symptoms / an exacerbation. Current symptoms include acute dyspnea, cough productive of white sputum in small amounts and wheezing. Symptoms have been present since several weeks ago and have been gradually worsening. He denies chills and fever. Associated symptoms include poor exercise tolerance and shortness of breath.  This episode appears to have been triggered by no identifiable factor. Treatments tried for the current exacerbation: albuterol inhaler. The patient has been having similar episodes for approximately 10 years. He uses 1 pillows at night. Patient currently is not on home oxygen therapy.. The patient is having no constitutional symptoms, denying fever, chills, anorexia, or weight loss. The patient has not been hospitalized for this condition before. He quit smoking approximately many years ago. The patient is experiencing exercise intolerance (difficulty walking 1 blocks on flat ground).    Answers for HPI/ROS submitted by the patient on 2/27/2017   In the past 4 weeks, how much of the time did your asthma keep you from getting as much done at work, school, or at home?: most of the time  During the past 4 weeks, how often have you had shortness of breath?: 3 to 6 times a week  During the past 4 weeks, how often did your asthma symptoms (Wheezing, coughing, shortness of breath, chest tightness or pain) wake you up at night or earlier that usual in the morning?: not at all  During the past 4 weeks, how often have you used your rescue inhaler or nebulizer medication (such as albuterol)?: 1 or 2 times per day  How would you rate your asthma control during the past 4 weeks?: somewhat controlled   : 15      Past Medical History:   Diagnosis Date    Asthma     BPH (benign prostatic hyperplasia)     CAD (coronary artery  disease)     40% lesion 2002;[facility]    Cirrhosis     Claudication 4/9/2014    Colon polyp     ED (erectile dysfunction)     Ex-smoker     Hearing loss [facility]     Hepatitis C     Cured;dr tomas harvoni 2015    Hyperlipidemia     Hypertension     Hypothyroid     s/p tx graves    Osteoarthritis     PVD (peripheral vascular disease)     Sleep apnea     Type 2 diabetes mellitus      Past Surgical History:   Procedure Laterality Date    CARDIAC CATHETERIZATION      CARPAL TUNNEL RELEASE      CATARACT EXTRACTION      CATARACT EXTRACTION W/ INTRAOCULAR LENS  IMPLANT, BILATERAL  2008    COLONOSCOPY  8/2013    COLONOSCOPY N/A 5/30/2016    Procedure: COLONOSCOPY;  Surgeon: Anna Tomas MD;  Location[facility];  Service: Endoscopy;  Laterality: N/A;    ELBOW BURSA SURGERY  2010    EYE SURGERY      KNEE SURGERY      RECTAL SURGERY  2012    TRIGGER FINGER RELEASE       Social History     Social History    Marital status:      Spouse name: YAEL    Number of children: 2    Years of education: N/A     Occupational History    Not on file.     Social History Main Topics    Smoking status: Former Smoker     Packs/day: 0.50     Years: 4.00     Quit date: 6/12/1972    Smokeless tobacco: Former User     Quit date: 11/18/1976    Alcohol use No      Comment: formerly drank, quit in late 2014    Drug use: No    Sexual activity: Yes     Partners: Female     Other Topics Concern    Not on file     Social History Narrative    . Lives with spouse. 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      Review of Systems   Constitutional: Positive for fatigue. Negative for fever.   HENT: Positive for postnasal drip and rhinorrhea. Negative for congestion.    Respiratory: Positive for cough, sputum production, shortness of breath, dyspnea on extertion, use of rescue inhaler and Paroxysmal Nocturnal Dyspnea.    Cardiovascular: Negative for chest pain, palpitations and leg  swelling.   Skin: Negative for rash.   Gastrointestinal: Negative for nausea and abdominal pain.   Neurological: Negative for dizziness, syncope, weakness and light-headedness.   Hematological: Negative for adenopathy. Does not bruise/bleed easily.   Psychiatric/Behavioral: Negative for sleep disturbance. The patient is not nervous/anxious.        Objective:      Physical Exam   Constitutional: He is oriented to person, place, and time. He appears well-developed and well-nourished.   HENT:   Head: Normocephalic and atraumatic.   Mouth/Throat: Oropharyngeal exudate present.   Eyes: Conjunctivae are normal. Pupils are equal, round, and reactive to light.   Neck: Neck supple. No JVD present. No tracheal deviation present. No thyromegaly present.   Cardiovascular: Normal rate, regular rhythm and normal heart sounds.    No murmur heard.  Pulmonary/Chest: Effort normal. He has decreased breath sounds. He has wheezes in the right lower field and the left lower field. He has no rhonchi. He has no rales.   Abdominal: Soft. Bowel sounds are normal.   Musculoskeletal: Normal range of motion. He exhibits no edema or tenderness.   Lymphadenopathy:     He has no cervical adenopathy.   Neurological: He is alert and oriented to person, place, and time.   Skin: Skin is warm and dry.   Nursing note and vitals reviewed.    Personal Diagnostic Review  Chest X-Ray: I personally reviewed the films and findings are:, air trapping/emphysema  Pulmonary function tests:  Mild obstruction  No flowsheet data found.      Assessment:       1. COPD exacerbation    2. Asthma with COPD    3. Seasonal allergic rhinitis, unspecified allergic rhinitis trigger        Outpatient Encounter Prescriptions as of 2/27/2017   Medication Sig Dispense Refill    ACIPHEX 20 mg tablet TAKE 1 TABLET BY MOUTH TWO TIMES A DAY 60 tablet 11    azelastine (ASTELIN) 137 mcg (0.1 %) nasal spray 2 sprays (274 mcg total) by Nasal route 2 (two) times daily. 30 mL 12    BD  "INSULIN PEN NEEDLE UF MINI 31 x 3/16 " Ndle USE AS DIRECTED 100 each 11    blood sugar diagnostic (BLOOD GLUCOSE TEST) Strp 1 strip by Misc.(Non-Drug; Combo Route) route 3 (three) times daily. accu-chek lux plus 100 strip 11    budesonide-formoterol 160-4.5 mcg (SYMBICORT) 160-4.5 mcg/actuation HFAA Inhale 2 puffs into the lungs 2 (two) times daily. 1 Inhaler 11    cycloSPORINE (RESTASIS) 0.05 % ophthalmic emulsion Place 0.4 mLs (1 drop total) into both eyes 2 (two) times daily. 60 each 11    GLUCOSAMINE HCL/CHONDR ROSARIO A NA (OSTEO BI-FLEX ORAL) Take 1 tablet by mouth once daily.      HUMALOG KWIKPEN 100 unit/mL InPn pen INJECT 3-4 UNITS INTO THE SKIN THREE TIMES DAILY BEFORE MEALS. 3 mL 1    insulin glargine (LANTUS SOLOSTAR) 100 unit/mL (3 mL) InPn pen Inject 40 Units into the skin once daily. 1 Box 11    lancets (ACCU-CHEK FASTCLIX) Misc Test 2-3 times daily 200 each 3    levocetirizine (XYZAL) 5 MG tablet Take 1 tablet (5 mg total) by mouth every evening. 30 tablet 11    levothyroxine (SYNTHROID) 137 MCG Tab tablet Take 2 tablets (274 mcg total) by mouth before breakfast. 60 tablet 5    meloxicam (MOBIC) 15 MG tablet   0    metoprolol succinate (TOPROL-XL) 50 MG 24 hr tablet TAKE ONE BY MOUTH TWO TIMES A DAY. 60 tablet 11    mometasone (NASONEX) 50 mcg/actuation nasal spray 2 sprays by Nasal route once daily. 17 g 12    montelukast (SINGULAIR) 10 mg tablet Take 1 tablet (10 mg total) by mouth once daily. 30 tablet 11    MV,CA,IRON,MIN/FA/PHYTOSTEROL (CENTRUM SPECIALIST HEART ORAL) Take 1 tablet by mouth 2 (two) times daily.      niacin, inositol niacinate, (NIACIN FLUSH FREE) 400 mg niacin (500 mg) Cap Take 500 mg by mouth every evening.  0    OMEGA-3 FATTY ACIDS/DHA/EPA (MEGARED PLANT-OMEGA-3 ORAL) Take 1 capsule by mouth once daily.      psyllium (METAMUCIL) 0.52 gram capsule Take by mouth.      RESTASIS 0.05 % ophthalmic emulsion PLACE 1 DROP IN BOTH EYES TWO TIMES A DAY 60 each 0    saw " palmetto 80 MG capsule Take 80 mg by mouth 2 (two) times daily.      TWYNSTA 80-10 mg Tab TAKE ONE BY MOUTH EVERY DAY. 30 tablet 11    [DISCONTINUED] budesonide-formoterol 160-4.5 mcg (SYMBICORT) 160-4.5 mcg/actuation HFAA Inhale 2 puffs into the lungs daily as needed.      [DISCONTINUED] mometasone (NASONEX) 50 mcg/actuation nasal spray 2 sprays by Nasal route once daily. 17 g 12    [DISCONTINUED] montelukast (SINGULAIR) 10 mg tablet Take 1 tablet (10 mg total) by mouth once daily. 30 tablet 0    albuterol 90 mcg/actuation inhaler Inhale 2 puffs into the lungs every 6 (six) hours. 1 Inhaler 12    azithromycin (ZITHROMAX Z-GADIEL) 250 MG tablet Take 1 tablet (250 mg total) by mouth once daily. 500 mg on day 1 (two tablets) followed by 250 mg once daily on days 2-5 6 tablet 1    methylPREDNISolone (MEDROL DOSEPACK) 4 mg tablet use as directed 1 Package 1     Facility-Administered Encounter Medications as of 2/27/2017   Medication Dose Route Frequency Provider Last Rate Last Dose    [COMPLETED] triamcinolone acetonide injection 80 mg  80 mg Intramuscular 1 time in Clinic/HOD Jose Wren MD   80 mg at 02/27/17 1443     Orders Placed This Encounter   Procedures    Spirometry with/without bronchodilator     Standing Status:   Future     Standing Expiration Date:   2/27/2018     Plan:       Requested Prescriptions     Signed Prescriptions Disp Refills    azithromycin (ZITHROMAX Z-GADIEL) 250 MG tablet 6 tablet 1     Sig: Take 1 tablet (250 mg total) by mouth once daily. 500 mg on day 1 (two tablets) followed by 250 mg once daily on days 2-5    methylPREDNISolone (MEDROL DOSEPACK) 4 mg tablet 1 Package 1     Sig: use as directed    budesonide-formoterol 160-4.5 mcg (SYMBICORT) 160-4.5 mcg/actuation HFAA 1 Inhaler 11     Sig: Inhale 2 puffs into the lungs 2 (two) times daily.    montelukast (SINGULAIR) 10 mg tablet 30 tablet 11     Sig: Take 1 tablet (10 mg total) by mouth once daily.    mometasone (NASONEX) 50  mcg/actuation nasal spray 17 g 12     Si sprays by Nasal route once daily.    albuterol 90 mcg/actuation inhaler 1 Inhaler 12     Sig: Inhale 2 puffs into the lungs every 6 (six) hours.     COPD exacerbation  -     triamcinolone acetonide injection 80 mg; Inject 2 mLs (80 mg total) into the muscle one time.  -     azithromycin (ZITHROMAX Z-GADIEL) 250 MG tablet; Take 1 tablet (250 mg total) by mouth once daily. 500 mg on day 1 (two tablets) followed by 250 mg once daily on days 2-5  Dispense: 6 tablet; Refill: 1  -     methylPREDNISolone (MEDROL DOSEPACK) 4 mg tablet; use as directed  Dispense: 1 Package; Refill: 1  -     albuterol 90 mcg/actuation inhaler; Inhale 2 puffs into the lungs every 6 (six) hours.  Dispense: 1 Inhaler; Refill: 12    Asthma with COPD  -     budesonide-formoterol 160-4.5 mcg (SYMBICORT) 160-4.5 mcg/actuation HFAA; Inhale 2 puffs into the lungs 2 (two) times daily.  Dispense: 1 Inhaler; Refill: 11  -     montelukast (SINGULAIR) 10 mg tablet; Take 1 tablet (10 mg total) by mouth once daily.  Dispense: 30 tablet; Refill: 11  -     Spirometry with/without bronchodilator; Future; Expected date: 17    Seasonal allergic rhinitis, unspecified allergic rhinitis trigger  -     mometasone (NASONEX) 50 mcg/actuation nasal spray; 2 sprays by Nasal route once daily.  Dispense: 17 g; Refill: 12    Return in about 6 months (around 2017) for terry.      Side effects of steroid medications discussed in detail. These side effects include : changes in mood, hyperglycemia, cataracts, fluid retention, hypertension, diabetes and weight gain.      MEDICAL DECISION MAKING: Moderate  complexity.  Overall, the multiple problems listed are of moderate severity that may impact quality of life and activities of daily living. Side effects of medications, treatment plan as well as options and alternatives reviewed and discussed with patient. There was counseling of patient concerning these issues.    Total time  spent in face to face counseling and coordination of care - 40 minutes over 50% of time was used in discussion of prognosis, risks, benefits of treatment, instructions and compliance with regimen . Discussion with other physicians and/or health care providers ( home health or for use of durable medical equipment (oxygen, nebulizers, CPAP, BiPAP) occurred.

## 2017-03-08 RX ORDER — INSULIN LISPRO 100 [IU]/ML
INJECTION, SOLUTION INTRAVENOUS; SUBCUTANEOUS
Qty: 3 ML | Refills: 1 | Status: SHIPPED | OUTPATIENT
Start: 2017-03-08 | End: 2017-06-30 | Stop reason: SDUPTHER

## 2017-03-14 ENCOUNTER — OFFICE VISIT (OUTPATIENT)
Dept: UROLOGY | Facility: CLINIC | Age: 72
End: 2017-03-14
Payer: MEDICARE

## 2017-03-14 VITALS
WEIGHT: 246.13 LBS | BODY MASS INDEX: 37.43 KG/M2 | DIASTOLIC BLOOD PRESSURE: 92 MMHG | SYSTOLIC BLOOD PRESSURE: 140 MMHG | HEART RATE: 68 BPM

## 2017-03-14 DIAGNOSIS — N41.9 PROSTATITIS, UNSPECIFIED PROSTATITIS TYPE: Primary | ICD-10-CM

## 2017-03-14 LAB
BILIRUB SERPL-MCNC: NORMAL MG/DL
BLOOD URINE, POC: NORMAL
COLOR, POC UA: YELLOW
GLUCOSE UR QL STRIP: NORMAL
KETONES UR QL STRIP: NORMAL
LEUKOCYTE ESTERASE URINE, POC: NORMAL
NITRITE, POC UA: NORMAL
PH, POC UA: 5
PROTEIN, POC: NORMAL
SPECIFIC GRAVITY, POC UA: 1.01
UROBILINOGEN, POC UA: NORMAL

## 2017-03-14 PROCEDURE — 99214 OFFICE O/P EST MOD 30 MIN: CPT | Mod: 25,S$GLB,, | Performed by: UROLOGY

## 2017-03-14 PROCEDURE — 81002 URINALYSIS NONAUTO W/O SCOPE: CPT | Mod: S$GLB,,, | Performed by: UROLOGY

## 2017-03-14 PROCEDURE — 99999 PR PBB SHADOW E&M-EST. PATIENT-LVL II: CPT | Mod: PBBFAC,,, | Performed by: UROLOGY

## 2017-03-14 RX ORDER — DOXYCYCLINE 100 MG/1
100 CAPSULE ORAL EVERY 12 HOURS
Qty: 28 CAPSULE | Refills: 0 | Status: SHIPPED | OUTPATIENT
Start: 2017-03-14 | End: 2017-03-28

## 2017-03-14 NOTE — MR AVS SNAPSHOT
[facility]  [address] [facility] [address]  Phone: [phone]  Fax: [phone]                  Erendira Pretty   3/14/2017 5:00 PM   Office Visit    Description:  Male : 1945   Provider:  Cooper Cordova IV, MD   Department:  [facility]           Diagnoses this Visit        Comments    Prostatitis, unspecified prostatitis type    -  Primary            To Do List           Future Appointments        Provider Department Dept Phone    3/15/2017 10:30 AM LABORATORY, [facility] [phone]    2017 7:00 AM [facility] [phone]    2017 9:30 AM LABORATORY, [facility] [phone]    2017 8:00 AM Anna Tomas MD [facility] [phone]    5/15/2017 8:15 AM Alejandro Parkinson MD [facility] -[phone]      Goals (5 Years of Data)              Today    17    Plan meals           Notes - Note created  3/31/2016  4:13 PM by Nikhil Watson MD      Tips to Control Acid Reflux  To control acid reflux, youll need to make some basic diet and lifestyle changes. The simple steps outlined below may be all youll need to relieve discomfort.  Watch What You Eat    · Avoid fatty foods and spicy foods.  · Eat fewer acidic foods, such as citrus and tomato-based foods. These can increase symptoms.  · Limit drinking alcohol, caffeine, and fizzy beverages. All increase acid reflux.  · Try limiting chocolate, peppermint, and spearmint. These can worsen acid reflux in some people.  Watch When You Eat  · Avoid lying down for 3 hours after eating.  · Do not snack before going to bed.  Raise Your Head    Raising your head and upper body by 4 inches to 6 inches helps limit reflux when youre lying down. Put blocks under the head of the bed frame to raise it.    © 8865-0674 [employer]. [address] [place] [address], [place] [address]. All rights reserved. This information  is not intended as a substitute for professional medical care. Always follow your healthcare professional's instructions.      GERD (Adult)    The esophagus is a tube that carries food from the mouth to the stomach. A valve at the lower end of the esophagus prevents stomach acid from flowing upward. If this valve does not work properly, acid from the stomach enters the esophagus. If this occurs over and over, the acid will injure the lining of the esophagus.  This condition is called GERD (gastroesophageal reflux disease) or acid reflux. When stomach acid flows upward into the esophagus, it causes burning, pressure or sharp pain in the upper abdomen or mid to lower chest. The pain can spread to the neck, back, or shoulder, similar to heart pain (angina). There may be belching, an acid taste in the back of the throat, chronic cough, or sore throat or hoarseness. GERD symptoms often occur during the day after a big meal, but it can also occur at night when lying down. Smoking, as well as drinking alcohol, increases the risk of GERD.  GERD is a chronic condition. Once it begins, it is often lifelong. Treatment includes changes in eating habits and the use of acid blocker medications to decrease the amount of acid in the stomach.  Symptoms often improve with treatment, but if treatment is stopped, the symptoms usually return after a few months. So most persons with GERD will need to continue treatment.  Home Care:  · Take the prescribed acid blocker medication for the full course of treatment even if you begin to feel better sooner. This medication can take up to several days to fully control your symptoms. If you cant afford the prescribed medication, you can try over-the-counter acid blockers, such as Pepcid AC, Tagamet, Zantac, or Aciphex. If these do not relieve your symptoms, a stronger acid-blocker can be tried, such as Prilosec OTC.  · You can use antacids, such as Tums, Rolaids, Mylanta, or Maalox, for pain.  "This will be useful the first few days after starting acid blockers when the blockers havent started working yet. Follow the directions on the label. Liquid antacids may work better than tablets. Note that antacids can interfere with absorption of certain medications. Specifically, do not take Tagamet (cimetidine), Zantac (ranitidine), or Carafate (sucralfate) within 1 hour of taking an antacid. Talk with your pharmacist if you have any questions.  · Limit or avoid fatty, fried, and spicy foods, as well as coffee, chocolate, mint, and foods with high acid content such as tomatoes and citrus fruit and juices (orange, grapefruit, lemon).  · Avoid alcohol and smoking.  · Dont eat large meals, especially at night. Frequent, smaller meals are best. Do not lie down right after eating. And dont eat anything 3 hours before going to bed.  · If you are overweight, losing weight will reduce symptoms. Women should not wear corsets or girdles because this increases pressure on the stomach and worsens reflux.  · If your symptoms occur during sleep, use a foam wedge to elevate your upper body (not just your head.) Or, place 4" blocks under the head of your bed.  Follow Up  with your doctor or as advised by our staff. Further testing may be needed. If you do not begin to improve over the next 4 days, contact your doctor. If you had an x-ray, CT scan, or ECG (electrocardiogram), it will be reviewed by a specialist. Youll be notified of any new findings that affect your care.  Get Prompt Medical Attention  if any of the following occur:  · Stomach pain gets worse or moves to the lower right abdomen (appendix area)  · Chest pain appears or gets worse, or spreads to the back, neck, shoulder, or arm  · Frequent vomiting (cant keep down liquids)  · Blood in the stool or vomit (red or black in color)  · Feeling weak or dizzy, fainting, or trouble breathing  · Fever of 100.4ºF (38ºC) or higher, or as directed by your healthcare " provider  © 3129-4670 [employer]. [address] [place] [address], [place] [address]. All rights reserved. This information is not intended as a substitute for professional medical care. Always follow your healthcare professional's instructions.              Weight < 90.719 kg (200 lb)   111.7 kg (246 lb 2.3 oz)  112 kg (246 lb 14.4 oz)  113.2 kg (249 lb 9 oz)    Notes - Note created  3/31/2016  4:12 PM by Nikhil Watson MD      Diabetes: Understanding Carbohydrates, Fats, and Protein  Food is a source of fuel and nourishment for your body. Its also a source of pleasure. Having diabetes doesnt mean you have to eat special foods or give up desserts. Instead, your dietitian can show you how to plan meals to suit your body. To start, learn how different foods affect blood sugar.    Carbohydrates  Carbohydrates are the main source of fuel for the body. Carbohydrates raise blood sugar. Many people think carbohydrates are only found in pasta or bread. But carbohydrates are actually in many kinds of foods:  · Sugars occur naturally in foods such as fruit, milk, honey, and molasses. Sugars can also be added to many foods, from cereals and yogurt to candy and desserts. Sugars raise blood sugar.  · Starches are found in bread, cereals, pasta, and dried beans. Theyre also found in corn, peas, potatoes, yam, acorn squash, and butternut squash. Starches also raise blood sugar.   · Fiber is found in foods such as vegetables, fruits, and whole grains. Unlike other carbs, fiber isnt digested or absorbed. So it doesnt raise blood sugar. In fact, fiber can help keep blood sugar from rising too fast. It also helps keep blood cholesterol at a healthy level.     Did You Know?  Even though carbohydrates raise blood sugar, its best to have some in every meal. They are an important part of a healthy diet.   Fat  Fat is an energy source that can be stored until needed. Fat does not raise blood sugar. However, it can raise blood  cholesterol, increasing the risk of heart disease. Fat is also high in calories, which can cause weight gain. Not all types of fat are the same.  More Healthy:  · Monounsaturated fats are mostly found in vegetable oils, such as olive, canola, and peanut oils. They are also found in avocados and some nuts. Monounsaturated fats are healthy for your heart. Thats because they lower LDL (unhealthy) cholesterol.  · Polyunsaturated fats are mostly found in vegetable oils, such as corn, safflower, and soybean oils. They are also found in some seeds, nuts, and fish. Polyunsaturated fats lower LDL (unhealthy) cholesterol. So, choosing them instead of saturated fats is healthy for your heart. Certain unsaturated fats can help lower triglycerides.   Less Healthy:  · Saturated fats are found in animal products, such as meat, poultry, whole milk, lard, and butter. Saturated fats raise LDL cholesterol and are not healthy for your heart.  · Hydrogenated oils and trans fats are formed when vegetable oils are processed into solid fats. They are found in many processed foods. Hydrogenated oils and trans fats raise LDL cholesterol and lower HDL (healthy) cholesterol. They are not healthy for your heart.  Protein  Protein helps the body build and repair muscle and other tissue. Protein has little or no effect on blood sugar. However, many foods that contain protein also contain saturated fat. By choosing low-fat protein sources, you can get the benefits of protein without the extra fat:  · Plant protein is found in dry beans and peas, nuts, and soy products, such as tofu and soymilk. These sources tend to be cholesterol-free and low in saturated fat.  · Animal protein is found in fish, poultry, meat, cheese, milk, and eggs. These contain cholesterol and can be high in saturated fat. Aim for lean, lower-fat choices.  © 7640-6949 [employer]. [address] [place] [address], [place] [address]. All rights reserved. This information  is not intended as a substitute for professional medical care. Always follow your healthcare professional's instructions.        Healthy Meals for Diabetes  Ask your health care team to help you make a meal plan that fits your needs. Your meal plan tells you when to eat your meals and snacks, what kinds of foods to eat, and how much of each food to eat. You dont have to give up all the foods you like. But you do need to follow some guidelines.  Eat foods rich in fiber  Fiber is a carbohydrate that breaks down slowly. Fiber is also healthy for your heart. Fiber-rich foods include:        Choose healthy protein foods  Eating protein that is low in fat can help you control your weight. It also helps keep your heart healthy. Low-fat protein foods include:  · Fish  · Plant proteins, such as dry beans and peas, nuts, and soy products like tofu and soymilk  · Lean meat with all visible fat removed  · Poultry with the skin removed  · Low-fat or nonfat milk, cheese, and yogurt  Limit unhealthy fats and sugar  Saturated and trans fats are unhealthy for your heart. They raise LDL (bad) cholesterol. Fat is also high in calories, so it can make you gain weight. To cut down on unhealthy fats and sugar, limit these foods:        How much to eat  The amount of food you eat affects your blood sugar. It also affects your weight. Your health care team will tell you how much of each type of food you should eat.  · Use measuring cups and spoons and a food scale to measure serving sizes.  · Learn what a correct serving size looks like on your plate. This will help when you are away from home and cant measure your servings.  · Eat only the number of servings given on your meal plan for each food. Dont take seconds.  When to eat  Your meal plan will likely include breakfast, lunch, dinner, and some snacks.  · Try to eat your meals and snacks at about the same times each day.  · Eat all your meals and snacks. Skipping a meal or snack can  make your blood sugar drop too low. It can also cause you to eat too much at the next meal or snack. Then your blood sugar could get too high.  © 0473-3246 [employer]. [address] [place] [address], [place] [address]. All rights reserved. This information is not intended as a substitute for professional medical care. Always follow your healthcare professional's instructions.      Metabolic Syndrome: Losing Excess Weight  Metabolic syndrome is a set of 5 health factors that can lead to serious health problems. The factors greatly increase your risk for diabetes, heart attack, or stroke. Extra weight with a large waist is one of the factors for metabolic syndrome. Being overweight or obese means that you weigh too much for what is healthy for your height. A large waist size is 40 inches or more for men, and 35 inches or more for women.  But you can take steps to lose weight and lower your risk for serious health problems.  Benefits of weight loss  Even with a small weight loss, you may have more energy and feel better. Losing even a small amount of weight can affect your blood pressure, triglycerides, HDL cholesterol, and blood sugar. You may be able to take less medicine for blood pressure, cholesterol, or blood sugar. Or you may be able to stop taking medicine. As you lose weight, your risk for diabetes, heart attack, and stroke will get lower.  Getting started  The best way to lose weight is to do it gradually. For example, lose 1/2 to 1 pound a week. You will need to be more active and eat healthier foods. Make sure to:  · Exercise every day. Talk with your health care provider to make sure it is safe for you to exercise. Make sure you start slowly. Begin with 10 to 15 minutes of activity. Try to exercise or be active for at least 30 minutes most days of the week. You can exercise all at once or break it up into 10- or 15-minute sessions. And think about other ways you can be more active throughout the  day.  · Eat healthy foods. Most successful dieters make changes in what, when, and how much they eat. The best way to lose weight is to eat fewer calories. You should make sure you check your portion sizes, eat breakfast, plan your meals and snacks, and eat slowly.  Working with your health care provider  Talk with your health care provider. He or she can guide you through the process of losing weight. As you begin to make changes, your health care provider will:  · Check your weight loss progress  · Check your blood pressure and blood test results  · Talk with you about your results  · Make suggestions about diet and exercise  · Recommend other experts or programs  · Make changes to your medicines and help with any side effects  Getting additional support  It can be hard to make healthy lifestyle changes. It may take some time to create new habits.  Your health care provider may suggest other experts or programs to help you, such as:  · Health . A health  gives ongoing support and makes suggestions to help you with healthy lifestyle changes, like weight loss.  · Weight loss programs. There are many safe weight loss programs. Some are free or low-cost.  · Dietician. He or she can help you make changes to your diet.  · Exercise specialist. He or she can help you with an exercise plan.  · Counselor. A counselor can help you deal with your feelings and emotions. There are psychiatrists, psychologists, and social workers who specialize in weight problems.  · Bariatric or obesity specialist.  These health care providers are experts in obesity. They can help with diet, exercise, behavioral therapy or counseling, medicines for weight loss, and very low-calorie diets.  · Bariatric surgeon. Weight loss surgery may be a choice. But it is only advised for people who are over a certain weight, who have health problems because of their weight, and who have not been able to lose weight with other treatments.  Keeping  the weight off  After losing weight, keeping it off can be even harder. Dont give up. Make sure to:  · Keep exercising. That means at least 40 to 60 minutes most days of the week.  · Keep eating healthy foods. Continue eating foods that are healthy and avoiding those that arent.  · Stay motivated. Watch your health improve. If you eat something unhealthy or skip exercising, dont give up. Simply make your next choice a healthy one.  © 4663-2168 [employer]. [address] [place] [address], [place] [address]. All rights reserved. This information is not intended as a substitute for professional medical care. Always follow your healthcare professional's instructions.                 These Medications        Disp Refills Start End    doxycycline (MONODOX) 100 MG capsule 28 capsule 0 3/14/2017 3/28/2017    Take 1 capsule (100 mg total) by mouth every 12 (twelve) hours. - Oral    Pharmacy: [facility] [address]  #: [phone]         [facility]     [facility] - 24/7 Assistance  Registered nurses in the [facility] provide clinical advisement, health education, appointment booking, and other advisory services.  Call for this free service at [phone].             Medications           Message regarding Medications     Verify the changes and/or additions to your medication regime listed below are the same as discussed with your clinician today.  If any of these changes or additions are incorrect, please notify your healthcare provider.        START taking these NEW medications        Refills    doxycycline (MONODOX) 100 MG capsule 0    Sig: Take 1 capsule (100 mg total) by mouth every 12 (twelve) hours.    Class: Normal    Route: Oral           Verify that the below list of medications is an accurate representation of the medications you are currently taking.  If none reported, the list may be blank. If incorrect, please contact your  "healthcare provider. Carry this list with you in case of emergency.           Current Medications     ACIPHEX 20 mg tablet TAKE 1 TABLET BY MOUTH TWO TIMES A DAY    albuterol 90 mcg/actuation inhaler Inhale 2 puffs into the lungs every 6 (six) hours.    azelastine (ASTELIN) 137 mcg (0.1 %) nasal spray 2 sprays (274 mcg total) by Nasal route 2 (two) times daily.    azithromycin (ZITHROMAX Z-GADIEL) 250 MG tablet Take 1 tablet (250 mg total) by mouth once daily. 500 mg on day 1 (two tablets) followed by 250 mg once daily on days 2-5    BD INSULIN PEN NEEDLE UF MINI 31 x 3/16 " Ndle USE AS DIRECTED    blood sugar diagnostic (BLOOD GLUCOSE TEST) Strp 1 strip by Misc.(Non-Drug; Combo Route) route 3 (three) times daily. accu-chek lux plus    budesonide-formoterol 160-4.5 mcg (SYMBICORT) 160-4.5 mcg/actuation HFAA Inhale 2 puffs into the lungs 2 (two) times daily.    cycloSPORINE (RESTASIS) 0.05 % ophthalmic emulsion Place 0.4 mLs (1 drop total) into both eyes 2 (two) times daily.    doxycycline (MONODOX) 100 MG capsule Take 1 capsule (100 mg total) by mouth every 12 (twelve) hours.    GLUCOSAMINE HCL/CHONDR ROSARIO A NA (OSTEO BI-FLEX ORAL) Take 1 tablet by mouth once daily.    HUMALOG KWIKPEN 100 unit/mL InPn pen INJECT 3-4 UNITS INTO THE SKIN THREE TIMES DAILY BEFORE MEALS.    insulin glargine (LANTUS SOLOSTAR) 100 unit/mL (3 mL) InPn pen Inject 40 Units into the skin once daily.    lancets (ACCU-CHEK FASTCLIX) Misc Test 2-3 times daily    levocetirizine (XYZAL) 5 MG tablet Take 1 tablet (5 mg total) by mouth every evening.    levothyroxine (SYNTHROID) 137 MCG Tab tablet Take 2 tablets (274 mcg total) by mouth before breakfast.    meloxicam (MOBIC) 15 MG tablet     methylPREDNISolone (MEDROL DOSEPACK) 4 mg tablet use as directed    metoprolol succinate (TOPROL-XL) 50 MG 24 hr tablet TAKE ONE BY MOUTH TWO TIMES A DAY.    mometasone (NASONEX) 50 mcg/actuation nasal spray 2 sprays by Nasal route once daily.    montelukast " (SINGULAIR) 10 mg tablet Take 1 tablet (10 mg total) by mouth once daily.    MV,CA,IRON,MIN/FA/PHYTOSTEROL (CENTRUM SPECIALIST HEART ORAL) Take 1 tablet by mouth 2 (two) times daily.    niacin, inositol niacinate, (NIACIN FLUSH FREE) 400 mg niacin (500 mg) Cap Take 500 mg by mouth every evening.    OMEGA-3 FATTY ACIDS/DHA/EPA (MEGARED PLANT-OMEGA-3 ORAL) Take 1 capsule by mouth once daily.    psyllium (METAMUCIL) 0.52 gram capsule Take by mouth.    RESTASIS 0.05 % ophthalmic emulsion PLACE 1 DROP IN BOTH EYES TWO TIMES A DAY    saw palmetto 80 MG capsule Take 80 mg by mouth 2 (two) times daily.    TWYNSTA 80-10 mg Tab TAKE ONE BY MOUTH EVERY DAY.           Clinical Reference Information           Your Vitals Were     BP Pulse Weight BMI       140/92 (BP Location: Left arm, Patient Position: Sitting) 68 111.7 kg (246 lb 2.3 oz) 37.43 kg/m2       Blood Pressure          Most Recent Value    BP  (!)  140/92      Allergies as of 3/14/2017     Iodinated Contrast Media - Iv Dye    Omeprazole    Prilosec [Omeprazole Magnesium]      Immunizations Administered on Date of Encounter - 3/14/2017     None      Orders Placed During Today's Visit      Normal Orders This Visit    POCT URINE DIPSTICK WITHOUT MICROSCOPE       [facility] Sign-Up     Activating your [facility] account is as easy as 1-2-3!     1) Visit [email or web address], select Sign Up Now, enter this activation code and your date of birth, then select Next.  Activation code not generated  Current Patient Portal Status: Account disabled      2) Create a username and password to use when you visit [facility] in the future and select a security question in case you lose your password and select Next.    3) Enter your e-mail address and click Sign Up!    Additional Information  If you have questions, please e-mail [email or web address] or call [phone] to talk to our [facility] staff. Remember, [facility] is NOT to be used for urgent needs. For medical emergencies, dial 911.          Language Assistance Services     ATTENTION: Language assistance services are available, free of charge. Please call [phone].      ATENCIÓN: Si habla baron, tiene a leach disposición servicios gratuitos de asistencia lingüística. Llame al [phone].     CHÚ Ý: N?u b?n nói Ti?ng Vi?t, có các d?ch v? h? tr? ngôn ng? mi?n phí dành cho b?n. G?i s? [phone].         [facility] complies with applicable Federal civil rights laws and does not discriminate on the basis of race, color, national origin, age, disability, or sex.

## 2017-03-14 NOTE — PROGRESS NOTES
"Chief Complaint: Hematospermia    HPI:   2/14/17: Two times last week had some hematospermia.  No gross hematuria.  No LUTS.  Has a burning sensation that just started.  Is coming off some Abx for URI (azithromycin/PCN).  Finds a burning sensation in the base of the penis, under scrotum at the end of voiding and then is better but still felt.  Uses a CPAP, nocturia x2-3.  Dr. Roblero checks prostate annually.  8/15: Melissa: "70-year-old male returns to the clinic today for follow-up on his BPH.  At his last visit, we had discussed that restricting his intake of caffeine had significantly helped his daytime symptoms, but he was still having trouble at night with having to get up to urinate.  We discussed monitoring his fluid intake in the evenings even more strictly, and he stated that he would prefer to do that rather than start on medication.  Since then, he has been able to restrict his evening fluid intake even more, and is doing better.  He is not interested in starting any medication at the present time. He is not having any other urinary symptoms or problems, and his urinalysis here in the clinic today is normal."    Allergies:  Iodinated contrast media - iv dye; Omeprazole; and Prilosec [omeprazole magnesium]    Medications:  has a current medication list which includes the following prescription(s): aciphex, albuterol, azelastine, azithromycin, bd insulin pen needle uf mini, blood sugar diagnostic, budesonide-formoterol 160-4.5 mcg, cyclosporine, glucosamine hcl/chondr leach a na, humalog kwikpen, insulin glargine, lancets, levocetirizine, levothyroxine, meloxicam, methylprednisolone, metoprolol succinate, mometasone, montelukast, mv,ca,iron,min/fa/phytosterol, niacin (inositol niacinate), omega-3 fatty acids/dha/epa, psyllium, restasis, saw palmetto, and twynsta.    Review of Systems:  General: No fever, chills, fatigability, or weight loss.  Skin: No rashes, itching, or changes in color or texture of " skin.  Chest: Denies MARCUS, cyanosis, wheezing, cough, and sputum production.  Abdomen: Appetite fine. No weight loss. Denies diarrhea, abdominal pain, hematemesis, or blood in stool.  Musculoskeletal: No joint stiffness or swelling. Denies back pain.  : As above.  All other review of systems negative.    PMH:   has a past medical history of Asthma; BPH (benign prostatic hyperplasia); CAD (coronary artery disease); Cirrhosis; Claudication (4/9/2014); Colon polyp; ED (erectile dysfunction); Ex-smoker; Hearing loss NEC; Hepatitis C; Hyperlipidemia; Hypertension; Hypothyroid; Osteoarthritis; PVD (peripheral vascular disease); Sleep apnea; and Type 2 diabetes mellitus.    PSH:   has a past surgical history that includes Knee surgery; Trigger finger release; Carpal tunnel release; Elbow bursa surgery (2010); Rectal surgery (2012); Eye surgery; Cataract extraction w/ intraocular lens  implant, bilateral (2008); Cataract extraction; Cardiac catheterization; Colonoscopy (8/2013); and Colonoscopy (N/A, 5/30/2016).    FamHx: family history includes Heart disease in his brother, father, and mother. There is no history of Colon cancer.    SocHx:  reports that he quit smoking about 44 years ago. He has a 2.00 pack-year smoking history. He quit smokeless tobacco use about 40 years ago. He reports that he does not drink alcohol or use illicit drugs.      Physical Exam:  Vitals:    03/14/17 1654   BP: (!) 140/92   Pulse: 68     General: A&Ox3, no apparent distress, no deformities  Neck: No masses, normal thyroid  Lungs: normal inspiration, no use of accessory muscles  Heart: normal pulse, no arrhythmias  Abdomen: Soft, NT, ND  Skin: The skin is warm and dry. No jaundice.  Ext: No c/c/e.  : Test desc elodia, no abnormalities of epididymus. Penis normal, with normal penile and scrotal skin. Meatus normal. Normal rectal tone, no hemorrhoids. Prost 20 gm no nodules or masses appreciated. SV not palpable. Perineum and anus  normal.    Labs/Studies:   Urinalysis performed in clinic, summary: UA normal  PSA    3/16: 0.4    Impression/Plan:   1. Will treat on presumption of prostatitis with doxy x2wks.  RTC if not improved.

## 2017-03-15 ENCOUNTER — LAB VISIT (OUTPATIENT)
Dept: LAB | Facility: HOSPITAL | Age: 72
End: 2017-03-15
Attending: FAMILY MEDICINE
Payer: MEDICARE

## 2017-03-15 DIAGNOSIS — E03.4 HYPOTHYROIDISM DUE TO ACQUIRED ATROPHY OF THYROID: ICD-10-CM

## 2017-03-15 LAB — TSH SERPL DL<=0.005 MIU/L-ACNC: 1.23 UIU/ML

## 2017-03-15 PROCEDURE — 36415 COLL VENOUS BLD VENIPUNCTURE: CPT | Mod: PO

## 2017-03-15 PROCEDURE — 84443 ASSAY THYROID STIM HORMONE: CPT

## 2017-03-24 ENCOUNTER — TELEPHONE (OUTPATIENT)
Dept: INTERNAL MEDICINE | Facility: CLINIC | Age: 72
End: 2017-03-24

## 2017-03-24 NOTE — TELEPHONE ENCOUNTER
----- Message from Rosetta Yoon sent at 3/24/2017  1:21 PM CDT -----  Contact: Edgar with [facility]  Calling to get last dateresults of patient's A1C lab. Please call Edgar @ [phone]. Thanks, kerry

## 2017-03-28 LAB
PRE FEV1 FVC: 69.24 % (ref 63.93–83.28)
PRE FEV1: 2.64 L (ref 2.27–3.76)
PRE FVC: 3.82 L (ref 3.23–4.99)
PRE PEF: 6.98 L/S (ref 5.74–10.13)

## 2017-03-31 DIAGNOSIS — E11.9 TYPE 2 DIABETES MELLITUS WITHOUT COMPLICATION: ICD-10-CM

## 2017-04-03 ENCOUNTER — TELEPHONE (OUTPATIENT)
Dept: UROLOGY | Facility: CLINIC | Age: 72
End: 2017-04-03

## 2017-04-03 ENCOUNTER — OFFICE VISIT (OUTPATIENT)
Dept: OTOLARYNGOLOGY | Facility: CLINIC | Age: 72
End: 2017-04-03
Payer: MEDICARE

## 2017-04-03 VITALS
DIASTOLIC BLOOD PRESSURE: 76 MMHG | BODY MASS INDEX: 37.88 KG/M2 | TEMPERATURE: 98 F | WEIGHT: 249.13 LBS | HEART RATE: 58 BPM | SYSTOLIC BLOOD PRESSURE: 124 MMHG

## 2017-04-03 DIAGNOSIS — J33.9 MULTIPLE NASAL POLYPS: ICD-10-CM

## 2017-04-03 DIAGNOSIS — J30.9 ALLERGIC RHINITIS, UNSPECIFIED ALLERGIC RHINITIS TRIGGER, UNSPECIFIED RHINITIS SEASONALITY: ICD-10-CM

## 2017-04-03 DIAGNOSIS — J32.4 CHRONIC PANSINUSITIS: Primary | ICD-10-CM

## 2017-04-03 PROCEDURE — 99214 OFFICE O/P EST MOD 30 MIN: CPT | Mod: S$GLB,,, | Performed by: OTOLARYNGOLOGY

## 2017-04-03 PROCEDURE — 99999 PR PBB SHADOW E&M-EST. PATIENT-LVL III: CPT | Mod: PBBFAC,,, | Performed by: OTOLARYNGOLOGY

## 2017-04-03 NOTE — PROGRESS NOTES
Referring Provider:    No referring provider defined for this encounter.  Subjective:   Patient: Erendira Pretty [id number], :1945   Visit date:4/3/2017 2:26 PM    Chief Complaint:  Follow-up (states he is doing alot better )    HPI:  Erendira is a 71 y.o. male who I was asked to see in consultation for evaluation of the following issue(s):    Patient was initially seen in 2017.  He reported significant nasal obstruction worse on the left than the right.  He also reported facial pain, pressure and discomfort.  He had had nasal polypectomy performed twice with the last one in about the year .  He had never had ALLERGY testing performed.  He reported moderate ALLERGIC symptoms of nasal congestion, nasal itchiness and rhinorrhea.  No imaging had been performed prior to seeing me.  He had been started on Augmentin shortly before his initial clinic visit and was having significant gastrointestinal issues with this.  He does have asthma.  Nasal endoscopy revealed bilateral nasal polyps with a very large polyp on the left side.  This was removed.  I placed him on levofloxacin, Nasonex twice a day and Astelin twice a day.  He was not given steroids due to his diabetes.  He return to clinic 2017.  He reports that his nasal obstruction is significantly better.  Additionally he is no longer having facial pain or pressure.  He denies significant nasal drainage.  He is having a persistent cough that is been lingering for about 2 months now.  He was on Singulair in the past but has been off of that for some time.  He is having to use his inhalers, specifically his albuterol inhaler multiple times daily.  ALLERGY testing by blood work was performed and revealed numerous class III ALLERGIES.  Returned April 3, 2017.  I had directed the patient to use Nasonex, Astelin, Xyzal and Singulair.  He stopped using the nasal sprays about 1 month ago but continued oral medications.  Despite this, he reported that  his breathing through the nose had dramatically improved.  He had no facial pressure or pain.  He had minimal nasal drainage.  His cough had resolved.  Overall he was feeling very good.      FINAL PATHOLOGIC DIAGNOSIS  SOFT TISSUE, LEFT NASAL CAVITY (EXCISION):  Benign respiratory mucosa with abundant eosinophils (up to 80 per 1 high-power field)  Diagnosed by: Fiona Zamora M.D.  (Electronically Signed: 2017-01-11 12:05:27)    Review of Systems:  -     Allergic/Immunologic: is allergic to iodinated contrast media - iv dye; omeprazole; and prilosec [omeprazole magnesium]..  -     Constitutional: Current temp: 98.2 °F (36.8 °C) (Tympanic)      His meds, allergies, medical, surgical, social & family histories were reviewed & updated:  -     He has a current medication list which includes the following prescription(s): aciphex, albuterol, bd insulin pen needle uf mini, blood sugar diagnostic, budesonide-formoterol 160-4.5 mcg, cyclosporine, glucosamine hcl/chondr leach a na, humalog kwikpen, insulin glargine, lancets, levocetirizine, levothyroxine, meloxicam, metoprolol succinate, mometasone, mv,ca,iron,min/fa/phytosterol, niacin (inositol niacinate), omega-3 fatty acids/dha/epa, psyllium, restasis, saw palmetto, and twynsta.  -     He  has a past medical history of Asthma; BPH (benign prostatic hyperplasia); CAD (coronary artery disease); Cirrhosis; Claudication (4/9/2014); Colon polyp; ED (erectile dysfunction); Ex-smoker; Hearing loss NEC; Hepatitis C; Hyperlipidemia; Hypertension; Hypothyroid; Osteoarthritis; PVD (peripheral vascular disease); Sleep apnea; and Type 2 diabetes mellitus.   -     He  does not have any pertinent problems on file.   -     He  has a past surgical history that includes Knee surgery; Trigger finger release; Carpal tunnel release; Elbow bursa surgery (2010); Rectal surgery (2012); Eye surgery; Cataract extraction w/ intraocular lens  implant, bilateral (2008); Cataract extraction; Cardiac  catheterization; Colonoscopy (8/2013); and Colonoscopy (N/A, 5/30/2016).  -     He  reports that he quit smoking about 44 years ago. He has a 2.00 pack-year smoking history. He quit smokeless tobacco use about 40 years ago. He reports that he does not drink alcohol or use illicit drugs.  -     His family history includes Heart disease in his brother, father, and mother. There is no history of Colon cancer.  -     He is allergic to iodinated contrast media - iv dye; omeprazole; and prilosec [omeprazole magnesium].    Objective:     Physical Exam:  Vitals:    /76  Pulse (!) 58  Temp 98.2 °F (36.8 °C) (Tympanic)   Wt 113 kg (249 lb 1.9 oz)  BMI 37.88 kg/m2  General appearance:  Well developed, well nourished    Eyes:  Extraocular motions intact, PERRL    Communication:  no hoarseness, no dysphonia    Ears:  Otoscopy of external auditory canals and tympanic membranes was normal, clinical speech reception thresholds grossly intact, no mass/lesion of auricle.  Nose:  No masses/lesions of external nose.  The nasal mucosa is without erythema or discharge, the nasal septum has no significant deviation and no perforation appreciated , and the inferior turbinates have No abnormal findings bilaterally.    Mouth:  No mass/lesion of lips, teeth, gums, hard/soft palate, tongue, tonsils, or oropharynx.    Cardiovascular:  No pedal edema; Radial Pulses +2     Neck & Lymphatics:  No cervical lymphadenopathy, no neck mass/crepitus/ asymmetry, trachea is midline, no thyroid enlargement/tenderness/mass.    Psych: Oriented x3,  Alert with normal mood and affect.     Respiration/Chest:  Symmetric expansion during respiration, normal respiratory effort.    Skin:  Warm and intact. No ulcerations of face, scalp, neck.    Assessment & Plan:   Erendira was seen today for follow-up.    Diagnoses and all orders for this visit:    Chronic pansinusitis    Multiple nasal polyps    Allergic rhinitis, unspecified allergic rhinitis trigger,  unspecified rhinitis seasonality    Continue xyzal and singulair  Resume nasonex (he didn't like astelin due to taste)  F/u 6 months or earlier if problems arise.

## 2017-04-03 NOTE — MR AVS SNAPSHOT
[facility]  [address] [address]  Phone: [phone]  Fax: [phone]                  Erendira Pretty   4/3/2017 10:15 AM   Office Visit    Description:  Male : 1945   Provider:  Alejandro Parkinson MD   Department:  [facility] - ENT           Reason for Visit     Follow-up                To Do List           Future Appointments        Provider Department Dept Phone    2017 7:00 AM [facility] US1 [facility] [phone]    2017 9:30 AM LABORATORY, [facility] [phone]    2017 8:00 AM Anna Tomas MD [facility] Gastroenterology [phone]    5/15/2017 8:15 AM Alejandro Parkinson MD [facility] -[phone]    2017 9:45 AM LABORATORY, [facility] [phone]      Goals (5 Years of Data)              Today    3/14/17    2/27/17    Plan meals           Notes - Note created  3/31/2016  4:13 PM by Nikhil Watson MD      Tips to Control Acid Reflux  To control acid reflux, youll need to make some basic diet and lifestyle changes. The simple steps outlined below may be all youll need to relieve discomfort.  Watch What You Eat    · Avoid fatty foods and spicy foods.  · Eat fewer acidic foods, such as citrus and tomato-based foods. These can increase symptoms.  · Limit drinking alcohol, caffeine, and fizzy beverages. All increase acid reflux.  · Try limiting chocolate, peppermint, and spearmint. These can worsen acid reflux in some people.  Watch When You Eat  · Avoid lying down for 3 hours after eating.  · Do not snack before going to bed.  Raise Your Head    Raising your head and upper body by 4 inches to 6 inches helps limit reflux when youre lying down. Put blocks under the head of the bed frame to raise it.    © 9984-7202 "[employer]. [address] [place] [address], [place] [address]. All rights reserved. This information is not intended as a substitute for professional medical care. Always follow your healthcare  professional's instructions.      GERD (Adult)    The esophagus is a tube that carries food from the mouth to the stomach. A valve at the lower end of the esophagus prevents stomach acid from flowing upward. If this valve does not work properly, acid from the stomach enters the esophagus. If this occurs over and over, the acid will injure the lining of the esophagus.  This condition is called GERD (gastroesophageal reflux disease) or acid reflux. When stomach acid flows upward into the esophagus, it causes burning, pressure or sharp pain in the upper abdomen or mid to lower chest. The pain can spread to the neck, back, or shoulder, similar to heart pain (angina). There may be belching, an acid taste in the back of the throat, chronic cough, or sore throat or hoarseness. GERD symptoms often occur during the day after a big meal, but it can also occur at night when lying down. Smoking, as well as drinking alcohol, increases the risk of GERD.  GERD is a chronic condition. Once it begins, it is often lifelong. Treatment includes changes in eating habits and the use of acid blocker medications to decrease the amount of acid in the stomach.  Symptoms often improve with treatment, but if treatment is stopped, the symptoms usually return after a few months. So most persons with GERD will need to continue treatment.  Home Care:  · Take the prescribed acid blocker medication for the full course of treatment even if you begin to feel better sooner. This medication can take up to several days to fully control your symptoms. If you cant afford the prescribed medication, you can try over-the-counter acid blockers, such as Pepcid AC, Tagamet, Zantac, or Aciphex. If these do not relieve your symptoms, a stronger acid-blocker can be tried, such as Prilosec OTC.  · You can use antacids, such as Tums, Rolaids, Mylanta, or Maalox, for pain. This will be useful the first few days after starting acid blockers when the blockers havent  "started working yet. Follow the directions on the label. Liquid antacids may work better than tablets. Note that antacids can interfere with absorption of certain medications. Specifically, do not take Tagamet (cimetidine), Zantac (ranitidine), or Carafate (sucralfate) within 1 hour of taking an antacid. Talk with your pharmacist if you have any questions.  · Limit or avoid fatty, fried, and spicy foods, as well as coffee, chocolate, mint, and foods with high acid content such as tomatoes and citrus fruit and juices (orange, grapefruit, lemon).  · Avoid alcohol and smoking.  · Dont eat large meals, especially at night. Frequent, smaller meals are best. Do not lie down right after eating. And dont eat anything 3 hours before going to bed.  · If you are overweight, losing weight will reduce symptoms. Women should not wear corsets or girdles because this increases pressure on the stomach and worsens reflux.  · If your symptoms occur during sleep, use a foam wedge to elevate your upper body (not just your head.) Or, place 4" blocks under the head of your bed.  Follow Up  with your doctor or as advised by our staff. Further testing may be needed. If you do not begin to improve over the next 4 days, contact your doctor. If you had an x-ray, CT scan, or ECG (electrocardiogram), it will be reviewed by a specialist. Youll be notified of any new findings that affect your care.  Get Prompt Medical Attention  if any of the following occur:  · Stomach pain gets worse or moves to the lower right abdomen (appendix area)  · Chest pain appears or gets worse, or spreads to the back, neck, shoulder, or arm  · Frequent vomiting (cant keep down liquids)  · Blood in the stool or vomit (red or black in color)  · Feeling weak or dizzy, fainting, or trouble breathing  · Fever of 100.4ºF (38ºC) or higher, or as directed by your healthcare provider  © 6070-6757 [employer]. [address] [place] [address], [place] [address]. All " rights reserved. This information is not intended as a substitute for professional medical care. Always follow your healthcare professional's instructions.              Weight < 90.719 kg (200 lb)   113 kg (249 lb 1.9 oz)  111.7 kg (246 lb 2.3 oz)  112 kg (246 lb 14.4 oz)    Notes - Note created  3/31/2016  4:12 PM by Nikhil Watson MD      Diabetes: Understanding Carbohydrates, Fats, and Protein  Food is a source of fuel and nourishment for your body. Its also a source of pleasure. Having diabetes doesnt mean you have to eat special foods or give up desserts. Instead, your dietitian can show you how to plan meals to suit your body. To start, learn how different foods affect blood sugar.    Carbohydrates  Carbohydrates are the main source of fuel for the body. Carbohydrates raise blood sugar. Many people think carbohydrates are only found in pasta or bread. But carbohydrates are actually in many kinds of foods:  · Sugars occur naturally in foods such as fruit, milk, honey, and molasses. Sugars can also be added to many foods, from cereals and yogurt to candy and desserts. Sugars raise blood sugar.  · Starches are found in bread, cereals, pasta, and dried beans. Theyre also found in corn, peas, potatoes, yam, acorn squash, and butternut squash. Starches also raise blood sugar.   · Fiber is found in foods such as vegetables, fruits, and whole grains. Unlike other carbs, fiber isnt digested or absorbed. So it doesnt raise blood sugar. In fact, fiber can help keep blood sugar from rising too fast. It also helps keep blood cholesterol at a healthy level.     Did You Know?  Even though carbohydrates raise blood sugar, its best to have some in every meal. They are an important part of a healthy diet.   Fat  Fat is an energy source that can be stored until needed. Fat does not raise blood sugar. However, it can raise blood cholesterol, increasing the risk of heart disease. Fat is also high in calories, which can cause  weight gain. Not all types of fat are the same.  More Healthy:  · Monounsaturated fats are mostly found in vegetable oils, such as olive, canola, and peanut oils. They are also found in avocados and some nuts. Monounsaturated fats are healthy for your heart. Thats because they lower LDL (unhealthy) cholesterol.  · Polyunsaturated fats are mostly found in vegetable oils, such as corn, safflower, and soybean oils. They are also found in some seeds, nuts, and fish. Polyunsaturated fats lower LDL (unhealthy) cholesterol. So, choosing them instead of saturated fats is healthy for your heart. Certain unsaturated fats can help lower triglycerides.   Less Healthy:  · Saturated fats are found in animal products, such as meat, poultry, whole milk, lard, and butter. Saturated fats raise LDL cholesterol and are not healthy for your heart.  · Hydrogenated oils and trans fats are formed when vegetable oils are processed into solid fats. They are found in many processed foods. Hydrogenated oils and trans fats raise LDL cholesterol and lower HDL (healthy) cholesterol. They are not healthy for your heart.  Protein  Protein helps the body build and repair muscle and other tissue. Protein has little or no effect on blood sugar. However, many foods that contain protein also contain saturated fat. By choosing low-fat protein sources, you can get the benefits of protein without the extra fat:  · Plant protein is found in dry beans and peas, nuts, and soy products, such as tofu and soymilk. These sources tend to be cholesterol-free and low in saturated fat.  · Animal protein is found in fish, poultry, meat, cheese, milk, and eggs. These contain cholesterol and can be high in saturated fat. Aim for lean, lower-fat choices.  © 4868-4914 [email or web address]. [address] [place] [address], [place] [address]. All rights reserved. This information is not intended as a substitute for professional medical care. Always follow your healthcare  professional's instructions.        Healthy Meals for Diabetes  Ask your health care team to help you make a meal plan that fits your needs. Your meal plan tells you when to eat your meals and snacks, what kinds of foods to eat, and how much of each food to eat. You dont have to give up all the foods you like. But you do need to follow some guidelines.  Eat foods rich in fiber  Fiber is a carbohydrate that breaks down slowly. Fiber is also healthy for your heart. Fiber-rich foods include:        Choose healthy protein foods  Eating protein that is low in fat can help you control your weight. It also helps keep your heart healthy. Low-fat protein foods include:  · Fish  · Plant proteins, such as dry beans and peas, nuts, and soy products like tofu and soymilk  · Lean meat with all visible fat removed  · Poultry with the skin removed  · Low-fat or nonfat milk, cheese, and yogurt  Limit unhealthy fats and sugar  Saturated and trans fats are unhealthy for your heart. They raise LDL (bad) cholesterol. Fat is also high in calories, so it can make you gain weight. To cut down on unhealthy fats and sugar, limit these foods:        How much to eat  The amount of food you eat affects your blood sugar. It also affects your weight. Your health care team will tell you how much of each type of food you should eat.  · Use measuring cups and spoons and a food scale to measure serving sizes.  · Learn what a correct serving size looks like on your plate. This will help when you are away from home and cant measure your servings.  · Eat only the number of servings given on your meal plan for each food. Dont take seconds.  When to eat  Your meal plan will likely include breakfast, lunch, dinner, and some snacks.  · Try to eat your meals and snacks at about the same times each day.  · Eat all your meals and snacks. Skipping a meal or snack can make your blood sugar drop too low. It can also cause you to eat too much at the next meal  or snack. Then your blood sugar could get too high.  © 6029-6525 [employer]. [address] [place] [address], [place] [address]. All rights reserved. This information is not intended as a substitute for professional medical care. Always follow your healthcare professional's instructions.      Metabolic Syndrome: Losing Excess Weight  Metabolic syndrome is a set of 5 health factors that can lead to serious health problems. The factors greatly increase your risk for diabetes, heart attack, or stroke. Extra weight with a large waist is one of the factors for metabolic syndrome. Being overweight or obese means that you weigh too much for what is healthy for your height. A large waist size is 40 inches or more for men, and 35 inches or more for women.  But you can take steps to lose weight and lower your risk for serious health problems.  Benefits of weight loss  Even with a small weight loss, you may have more energy and feel better. Losing even a small amount of weight can affect your blood pressure, triglycerides, HDL cholesterol, and blood sugar. You may be able to take less medicine for blood pressure, cholesterol, or blood sugar. Or you may be able to stop taking medicine. As you lose weight, your risk for diabetes, heart attack, and stroke will get lower.  Getting started  The best way to lose weight is to do it gradually. For example, lose 1/2 to 1 pound a week. You will need to be more active and eat healthier foods. Make sure to:  · Exercise every day. Talk with your health care provider to make sure it is safe for you to exercise. Make sure you start slowly. Begin with 10 to 15 minutes of activity. Try to exercise or be active for at least 30 minutes most days of the week. You can exercise all at once or break it up into 10- or 15-minute sessions. And think about other ways you can be more active throughout the day.  · Eat healthy foods. Most successful dieters make changes in what, when, and how much  they eat. The best way to lose weight is to eat fewer calories. You should make sure you check your portion sizes, eat breakfast, plan your meals and snacks, and eat slowly.  Working with your health care provider  Talk with your health care provider. He or she can guide you through the process of losing weight. As you begin to make changes, your health care provider will:  · Check your weight loss progress  · Check your blood pressure and blood test results  · Talk with you about your results  · Make suggestions about diet and exercise  · Recommend other experts or programs  · Make changes to your medicines and help with any side effects  Getting additional support  It can be hard to make healthy lifestyle changes. It may take some time to create new habits.  Your health care provider may suggest other experts or programs to help you, such as:  · Health . A health  gives ongoing support and makes suggestions to help you with healthy lifestyle changes, like weight loss.  · Weight loss programs. There are many safe weight loss programs. Some are free or low-cost.  · Dietician. He or she can help you make changes to your diet.  · Exercise specialist. He or she can help you with an exercise plan.  · Counselor. A counselor can help you deal with your feelings and emotions. There are psychiatrists, psychologists, and social workers who specialize in weight problems.  · Bariatric or obesity specialist.  These health care providers are experts in obesity. They can help with diet, exercise, behavioral therapy or counseling, medicines for weight loss, and very low-calorie diets.  · Bariatric surgeon. Weight loss surgery may be a choice. But it is only advised for people who are over a certain weight, who have health problems because of their weight, and who have not been able to lose weight with other treatments.  Keeping the weight off  After losing weight, keeping it off can be even harder. Dont give up. Make  sure to:  · Keep exercising. That means at least 40 to 60 minutes most days of the week.  · Keep eating healthy foods. Continue eating foods that are healthy and avoiding those that arent.  · Stay motivated. Watch your health improve. If you eat something unhealthy or skip exercising, dont give up. Simply make your next choice a healthy one.  © 7080-5324 [employer]. [address] [place] [address], [place] [address]. All rights reserved. This information is not intended as a substitute for professional medical care. Always follow your healthcare professional's instructions.                [facility]     [facility] On Call Nurse Care Line - 24/7 Assistance  Unless otherwise directed by your provider, please contact [facility], our nurse care line that is available for 24/7 assistance.     Registered nurses in the [facility] provide: appointment scheduling, clinical advisement, health education, and other advisory services.  Call: [phone] (toll free)               Medications           Message regarding Medications     Verify the changes and/or additions to your medication regime listed below are the same as discussed with your clinician today.  If any of these changes or additions are incorrect, please notify your healthcare provider.        STOP taking these medications     azithromycin (ZITHROMAX Z-GADIEL) 250 MG tablet Take 1 tablet (250 mg total) by mouth once daily. 500 mg on day 1 (two tablets) followed by 250 mg once daily on days 2-5    azelastine (ASTELIN) 137 mcg (0.1 %) nasal spray 2 sprays (274 mcg total) by Nasal route 2 (two) times daily.           Verify that the below list of medications is an accurate representation of the medications you are currently taking.  If none reported, the list may be blank. If incorrect, please contact your healthcare provider. Carry this list with you in case of emergency.           Current Medications     ACIPHEX 20 mg tablet TAKE 1 TABLET  "BY MOUTH TWO TIMES A DAY    albuterol 90 mcg/actuation inhaler Inhale 2 puffs into the lungs every 6 (six) hours.    BD INSULIN PEN NEEDLE UF MINI 31 x 3/16 " Ndle USE AS DIRECTED    blood sugar diagnostic (BLOOD GLUCOSE TEST) Strp 1 strip by Misc.(Non-Drug; Combo Route) route 3 (three) times daily. accu-chek lux plus    budesonide-formoterol 160-4.5 mcg (SYMBICORT) 160-4.5 mcg/actuation HFAA Inhale 2 puffs into the lungs 2 (two) times daily.    cycloSPORINE (RESTASIS) 0.05 % ophthalmic emulsion Place 0.4 mLs (1 drop total) into both eyes 2 (two) times daily.    GLUCOSAMINE HCL/CHONDR ROSARIO A NA (OSTEO BI-FLEX ORAL) Take 1 tablet by mouth once daily.    HUMALOG KWIKPEN 100 unit/mL InPn pen INJECT 3-4 UNITS INTO THE SKIN THREE TIMES DAILY BEFORE MEALS.    insulin glargine (LANTUS SOLOSTAR) 100 unit/mL (3 mL) InPn pen Inject 40 Units into the skin once daily.    lancets (ACCU-CHEK FASTCLIX) Misc Test 2-3 times daily    levocetirizine (XYZAL) 5 MG tablet Take 1 tablet (5 mg total) by mouth every evening.    levothyroxine (SYNTHROID) 137 MCG Tab tablet Take 2 tablets (274 mcg total) by mouth before breakfast.    meloxicam (MOBIC) 15 MG tablet     metoprolol succinate (TOPROL-XL) 50 MG 24 hr tablet TAKE ONE BY MOUTH TWO TIMES A DAY.    mometasone (NASONEX) 50 mcg/actuation nasal spray 2 sprays by Nasal route once daily.    MV,CA,IRON,MIN/FA/PHYTOSTEROL (CENTRUM SPECIALIST HEART ORAL) Take 1 tablet by mouth 2 (two) times daily.    niacin, inositol niacinate, (NIACIN FLUSH FREE) 400 mg niacin (500 mg) Cap Take 500 mg by mouth every evening.    OMEGA-3 FATTY ACIDS/DHA/EPA (MEGARED PLANT-OMEGA-3 ORAL) Take 1 capsule by mouth once daily.    psyllium (METAMUCIL) 0.52 gram capsule Take by mouth.    RESTASIS 0.05 % ophthalmic emulsion PLACE 1 DROP IN BOTH EYES TWO TIMES A DAY    saw palmetto 80 MG capsule Take 80 mg by mouth 2 (two) times daily.    TWYNSTA 80-10 mg Tab TAKE ONE BY MOUTH EVERY DAY.           Clinical Reference " Information           Your Vitals Were     BP Pulse Temp Weight BMI    124/76 58 98.2 °F (36.8 °C) (Tympanic) 113 kg (249 lb 1.9 oz) 37.88 kg/m2      Blood Pressure          Most Recent Value    BP  124/76      Allergies as of 4/3/2017     Iodinated Contrast Media - Iv Dye    Omeprazole    Prilosec [Omeprazole Magnesium]      Immunizations Administered on Date of Encounter - 4/3/2017     None      [facility] Sign-Up     Activating your [facility] account is as easy as 1-2-3!     1) Visit [email or web address], select Sign Up Now, enter this activation code and your date of birth, then select Next.  Activation code not generated  Current Patient Portal Status: Account disabled      2) Create a username and password to use when you visit [facility] in the future and select a security question in case you lose your password and select Next.    3) Enter your e-mail address and click Sign Up!    Additional Information  If you have questions, please e-mail [email or web address] or call [phone] to talk to our [facility] staff. Remember, [facility] is NOT to be used for urgent needs. For medical emergencies, dial 911.         Language Assistance Services     ATTENTION: Language assistance services are available, free of charge. Please call [phone].      ATENCIÓN: Si habla español, tiene a leach disposición servicios gratuitos de asistencia lingüística. Llame al [phone].     CHÚ Ý: N?u b?n nói Ti?ng Vi?t, có các d?ch v? h? tr? ngôn ng? mi?n phí dành cho b?n. G?i s? [phone].         [facility] complies with applicable Federal civil rights laws and does not discriminate on the basis of race, color, national origin, age, disability, or sex.

## 2017-04-03 NOTE — TELEPHONE ENCOUNTER
----- Message from Sandi Rosales sent at 4/3/2017  2:32 PM CDT -----  Would like to speak to nurse because antibiotics given arent working. Please call back at [phone]. thanks

## 2017-04-04 ENCOUNTER — OFFICE VISIT (OUTPATIENT)
Dept: UROLOGY | Facility: CLINIC | Age: 72
End: 2017-04-04
Payer: MEDICARE

## 2017-04-04 VITALS
WEIGHT: 249.44 LBS | SYSTOLIC BLOOD PRESSURE: 132 MMHG | BODY MASS INDEX: 37.93 KG/M2 | DIASTOLIC BLOOD PRESSURE: 74 MMHG

## 2017-04-04 DIAGNOSIS — R36.1 HEMATOSPERMIA: Primary | ICD-10-CM

## 2017-04-04 PROCEDURE — 81002 URINALYSIS NONAUTO W/O SCOPE: CPT | Mod: S$GLB,,, | Performed by: UROLOGY

## 2017-04-04 PROCEDURE — 99214 OFFICE O/P EST MOD 30 MIN: CPT | Mod: 25,S$GLB,, | Performed by: UROLOGY

## 2017-04-04 PROCEDURE — 99999 PR PBB SHADOW E&M-EST. PATIENT-LVL II: CPT | Mod: PBBFAC,,, | Performed by: UROLOGY

## 2017-04-04 NOTE — MR AVS SNAPSHOT
[facility]  [address] [facility] [address]  Phone: [phone]  Fax: [phone]                  Erendira Pretty   2017 2:00 PM   Office Visit    Description:  Male : 1945   Provider:  Cooper Cordova IV, MD   Department:  [facility]           Diagnoses this Visit        Comments    Hematospermia    -  Primary            To Do List           Future Appointments        Provider Department Dept Phone    2017 7:00 AM [facility] [phone]    2017 9:30 AM LABORATORY, [facility] [phone]    2017 8:00 AM Anna Tomas MD [facility] Gastro[facility]ology [phone]    5/15/2017 8:15 AM Alejandro Parkinson MD [facility] [phone]    2017 9:45 AM [facility], [facility] [phone]      Goals (5 Years of Data)              Today    4/3/17    3/14/17    Plan meals           Notes - Note created  3/31/2016  4:13 PM by Nikhil Watson MD      Tips to Control Acid Reflux  To control acid reflux, youll need to make some basic diet and lifestyle changes. The simple steps outlined below may be all youll need to relieve discomfort.  Watch What You Eat    · Avoid fatty foods and spicy foods.  · Eat fewer acidic foods, such as citrus and tomato-based foods. These can increase symptoms.  · Limit drinking alcohol, caffeine, and fizzy beverages. All increase acid reflux.  · Try limiting chocolate, peppermint, and spearmint. These can worsen acid reflux in some people.  Watch When You Eat  · Avoid lying down for 3 hours after eating.  · Do not snack before going to bed.  Raise Your Head    Raising your head and upper body by 4 inches to 6 inches helps limit reflux when youre lying down. Put blocks under the head of the bed frame to raise it.    © 0703-6970 [employer]. [address] [place] [address], [place] [address]. All rights reserved. This information is not intended as a substitute  for professional medical care. Always follow your healthcare professional's instructions.      GERD (Adult)    The esophagus is a tube that carries food from the mouth to the stomach. A valve at the lower end of the esophagus prevents stomach acid from flowing upward. If this valve does not work properly, acid from the stomach enters the esophagus. If this occurs over and over, the acid will injure the lining of the esophagus.  This condition is called GERD (gastroesophageal reflux disease) or acid reflux. When stomach acid flows upward into the esophagus, it causes burning, pressure or sharp pain in the upper abdomen or mid to lower chest. The pain can spread to the neck, back, or shoulder, similar to heart pain (angina). There may be belching, an acid taste in the back of the throat, chronic cough, or sore throat or hoarseness. GERD symptoms often occur during the day after a big meal, but it can also occur at night when lying down. Smoking, as well as drinking alcohol, increases the risk of GERD.  GERD is a chronic condition. Once it begins, it is often lifelong. Treatment includes changes in eating habits and the use of acid blocker medications to decrease the amount of acid in the stomach.  Symptoms often improve with treatment, but if treatment is stopped, the symptoms usually return after a few months. So most persons with GERD will need to continue treatment.  Home Care:  · Take the prescribed acid blocker medication for the full course of treatment even if you begin to feel better sooner. This medication can take up to several days to fully control your symptoms. If you cant afford the prescribed medication, you can try over-the-counter acid blockers, such as Pepcid AC, Tagamet, Zantac, or Aciphex. If these do not relieve your symptoms, a stronger acid-blocker can be tried, such as Prilosec OTC.  · You can use antacids, such as Tums, Rolaids, Mylanta, or Maalox, for pain. This will be useful the first few  "days after starting acid blockers when the blockers havent started working yet. Follow the directions on the label. Liquid antacids may work better than tablets. Note that antacids can interfere with absorption of certain medications. Specifically, do not take Tagamet (cimetidine), Zantac (ranitidine), or Carafate (sucralfate) within 1 hour of taking an antacid. Talk with your pharmacist if you have any questions.  · Limit or avoid fatty, fried, and spicy foods, as well as coffee, chocolate, mint, and foods with high acid content such as tomatoes and citrus fruit and juices (orange, grapefruit, lemon).  · Avoid alcohol and smoking.  · Dont eat large meals, especially at night. Frequent, smaller meals are best. Do not lie down right after eating. And dont eat anything 3 hours before going to bed.  · If you are overweight, losing weight will reduce symptoms. Women should not wear corsets or girdles because this increases pressure on the stomach and worsens reflux.  · If your symptoms occur during sleep, use a foam wedge to elevate your upper body (not just your head.) Or, place 4" blocks under the head of your bed.  Follow Up  with your doctor or as advised by our staff. Further testing may be needed. If you do not begin to improve over the next 4 days, contact your doctor. If you had an x-ray, CT scan, or ECG (electrocardiogram), it will be reviewed by a specialist. Youll be notified of any new findings that affect your care.  Get Prompt Medical Attention  if any of the following occur:  · Stomach pain gets worse or moves to the lower right abdomen (appendix area)  · Chest pain appears or gets worse, or spreads to the back, neck, shoulder, or arm  · Frequent vomiting (cant keep down liquids)  · Blood in the stool or vomit (red or black in color)  · Feeling weak or dizzy, fainting, or trouble breathing  · Fever of 100.4ºF (38ºC) or higher, or as directed by your healthcare provider  © 2491-0102 [employer] " [employer]. [address] [place] [address]. All rights reserved. This information is not intended as a substitute for professional medical care. Always follow your healthcare professional's instructions.              Weight < 90.719 kg (200 lb)   113.2 kg (249 lb 7.2 oz)  113 kg (249 lb 1.9 oz)  111.7 kg (246 lb 2.3 oz)    Notes - Note created  3/31/2016  4:12 PM by Nikhil Watson MD      Diabetes: Understanding Carbohydrates, Fats, and Protein  Food is a source of fuel and nourishment for your body. Its also a source of pleasure. Having diabetes doesnt mean you have to eat special foods or give up desserts. Instead, your dietitian can show you how to plan meals to suit your body. To start, learn how different foods affect blood sugar.    Carbohydrates  Carbohydrates are the main source of fuel for the body. Carbohydrates raise blood sugar. Many people think carbohydrates are only found in pasta or bread. But carbohydrates are actually in many kinds of foods:  · Sugars occur naturally in foods such as fruit, milk, honey, and molasses. Sugars can also be added to many foods, from cereals and yogurt to candy and desserts. Sugars raise blood sugar.  · Starches are found in bread, cereals, pasta, and dried beans. Theyre also found in corn, peas, potatoes, yam, acorn squash, and butternut squash. Starches also raise blood sugar.   · Fiber is found in foods such as vegetables, fruits, and whole grains. Unlike other carbs, fiber isnt digested or absorbed. So it doesnt raise blood sugar. In fact, fiber can help keep blood sugar from rising too fast. It also helps keep blood cholesterol at a healthy level.     Did You Know?  Even though carbohydrates raise blood sugar, its best to have some in every meal. They are an important part of a healthy diet.   Fat  Fat is an energy source that can be stored until needed. Fat does not raise blood sugar. However, it can raise blood cholesterol, increasing the risk of  heart disease. Fat is also high in calories, which can cause weight gain. Not all types of fat are the same.  More Healthy:  · Monounsaturated fats are mostly found in vegetable oils, such as olive, canola, and peanut oils. They are also found in avocados and some nuts. Monounsaturated fats are healthy for your heart. Thats because they lower LDL (unhealthy) cholesterol.  · Polyunsaturated fats are mostly found in vegetable oils, such as corn, safflower, and soybean oils. They are also found in some seeds, nuts, and fish. Polyunsaturated fats lower LDL (unhealthy) cholesterol. So, choosing them instead of saturated fats is healthy for your heart. Certain unsaturated fats can help lower triglycerides.   Less Healthy:  · Saturated fats are found in animal products, such as meat, poultry, whole milk, lard, and butter. Saturated fats raise LDL cholesterol and are not healthy for your heart.  · Hydrogenated oils and trans fats are formed when vegetable oils are processed into solid fats. They are found in many processed foods. Hydrogenated oils and trans fats raise LDL cholesterol and lower HDL (healthy) cholesterol. They are not healthy for your heart.  Protein  Protein helps the body build and repair muscle and other tissue. Protein has little or no effect on blood sugar. However, many foods that contain protein also contain saturated fat. By choosing low-fat protein sources, you can get the benefits of protein without the extra fat:  · Plant protein is found in dry beans and peas, nuts, and soy products, such as tofu and soymilk. These sources tend to be cholesterol-free and low in saturated fat.  · Animal protein is found in fish, poultry, meat, cheese, milk, and eggs. These contain cholesterol and can be high in saturated fat. Aim for lean, lower-fat choices.  © 0555-4059 [employer]. [address] [place] [address], [place] [address]. All rights reserved. This information is not intended as a substitute for  professional medical care. Always follow your healthcare professional's instructions.        Healthy Meals for Diabetes  Ask your health care team to help you make a meal plan that fits your needs. Your meal plan tells you when to eat your meals and snacks, what kinds of foods to eat, and how much of each food to eat. You dont have to give up all the foods you like. But you do need to follow some guidelines.  Eat foods rich in fiber  Fiber is a carbohydrate that breaks down slowly. Fiber is also healthy for your heart. Fiber-rich foods include:        Choose healthy protein foods  Eating protein that is low in fat can help you control your weight. It also helps keep your heart healthy. Low-fat protein foods include:  · Fish  · Plant proteins, such as dry beans and peas, nuts, and soy products like tofu and soymilk  · Lean meat with all visible fat removed  · Poultry with the skin removed  · Low-fat or nonfat milk, cheese, and yogurt  Limit unhealthy fats and sugar  Saturated and trans fats are unhealthy for your heart. They raise LDL (bad) cholesterol. Fat is also high in calories, so it can make you gain weight. To cut down on unhealthy fats and sugar, limit these foods:        How much to eat  The amount of food you eat affects your blood sugar. It also affects your weight. Your health care team will tell you how much of each type of food you should eat.  · Use measuring cups and spoons and a food scale to measure serving sizes.  · Learn what a correct serving size looks like on your plate. This will help when you are away from home and cant measure your servings.  · Eat only the number of servings given on your meal plan for each food. Dont take seconds.  When to eat  Your meal plan will likely include breakfast, lunch, dinner, and some snacks.  · Try to eat your meals and snacks at about the same times each day.  · Eat all your meals and snacks. Skipping a meal or snack can make your blood sugar drop too low.  It can also cause you to eat too much at the next meal or snack. Then your blood sugar could get too high.  © 6379-8552 [employer]. [address] [place] [address], [place] [address]. All rights reserved. This information is not intended as a substitute for professional medical care. Always follow your healthcare professional's instructions.      Metabolic Syndrome: Losing Excess Weight  Metabolic syndrome is a set of 5 health factors that can lead to serious health problems. The factors greatly increase your risk for diabetes, heart attack, or stroke. Extra weight with a large waist is one of the factors for metabolic syndrome. Being overweight or obese means that you weigh too much for what is healthy for your height. A large waist size is 40 inches or more for men, and 35 inches or more for women.  But you can take steps to lose weight and lower your risk for serious health problems.  Benefits of weight loss  Even with a small weight loss, you may have more energy and feel better. Losing even a small amount of weight can affect your blood pressure, triglycerides, HDL cholesterol, and blood sugar. You may be able to take less medicine for blood pressure, cholesterol, or blood sugar. Or you may be able to stop taking medicine. As you lose weight, your risk for diabetes, heart attack, and stroke will get lower.  Getting started  The best way to lose weight is to do it gradually. For example, lose 1/2 to 1 pound a week. You will need to be more active and eat healthier foods. Make sure to:  · Exercise every day. Talk with your health care provider to make sure it is safe for you to exercise. Make sure you start slowly. Begin with 10 to 15 minutes of activity. Try to exercise or be active for at least 30 minutes most days of the week. You can exercise all at once or break it up into 10- or 15-minute sessions. And think about other ways you can be more active throughout the day.  · Eat healthy foods. Most  successful dieters make changes in what, when, and how much they eat. The best way to lose weight is to eat fewer calories. You should make sure you check your portion sizes, eat breakfast, plan your meals and snacks, and eat slowly.  Working with your health care provider  Talk with your health care provider. He or she can guide you through the process of losing weight. As you begin to make changes, your health care provider will:  · Check your weight loss progress  · Check your blood pressure and blood test results  · Talk with you about your results  · Make suggestions about diet and exercise  · Recommend other experts or programs  · Make changes to your medicines and help with any side effects  Getting additional support  It can be hard to make healthy lifestyle changes. It may take some time to create new habits.  Your health care provider may suggest other experts or programs to help you, such as:  · Health . A health  gives ongoing support and makes suggestions to help you with healthy lifestyle changes, like weight loss.  · Weight loss programs. There are many safe weight loss programs. Some are free or low-cost.  · Dietician. He or she can help you make changes to your diet.  · Exercise specialist. He or she can help you with an exercise plan.  · Counselor. A counselor can help you deal with your feelings and emotions. There are psychiatrists, psychologists, and social workers who specialize in weight problems.  · Bariatric or obesity specialist.  These health care providers are experts in obesity. They can help with diet, exercise, behavioral therapy or counseling, medicines for weight loss, and very low-calorie diets.  · Bariatric surgeon. Weight loss surgery may be a choice. But it is only advised for people who are over a certain weight, who have health problems because of their weight, and who have not been able to lose weight with other treatments.  Keeping the weight off  After losing  "weight, keeping it off can be even harder. Dont give up. Make sure to:  · Keep exercising. That means at least 40 to 60 minutes most days of the week.  · Keep eating healthy foods. Continue eating foods that are healthy and avoiding those that arent.  · Stay motivated. Watch your health improve. If you eat something unhealthy or skip exercising, dont give up. Simply make your next choice a healthy one.  © 2443-7477 [employer]. [address] [place] [address], [place] [address]. All rights reserved. This information is not intended as a substitute for professional medical care. Always follow your healthcare professional's instructions.                [facility]     [facility] Care [facility] - 24/7 Assistance  Unless otherwise directed by your provider, please contact [facility], our nurse care line that is available for 24/7 assistance.     Registered nurses in the [facility] provide: appointment scheduling, clinical advisement, health education, and other advisory services.  Call: [phone] (toll free)               Medications           Message regarding Medications     Verify the changes and/or additions to your medication regime listed below are the same as discussed with your clinician today.  If any of these changes or additions are incorrect, please notify your healthcare provider.             Verify that the below list of medications is an accurate representation of the medications you are currently taking.  If none reported, the list may be blank. If incorrect, please contact your healthcare provider. Carry this list with you in case of emergency.           Current Medications     ACIPHEX 20 mg tablet TAKE 1 TABLET BY MOUTH TWO TIMES A DAY    albuterol 90 mcg/actuation inhaler Inhale 2 puffs into the lungs every 6 (six) hours.    BD INSULIN PEN NEEDLE UF MINI 31 x 3/16 " Ndle USE AS DIRECTED    blood sugar diagnostic (BLOOD GLUCOSE TEST) Strp 1 strip by " Misc.(Non-Drug; Combo Route) route 3 (three) times daily. accu-chek lux plus    budesonide-formoterol 160-4.5 mcg (SYMBICORT) 160-4.5 mcg/actuation HFAA Inhale 2 puffs into the lungs 2 (two) times daily.    cycloSPORINE (RESTASIS) 0.05 % ophthalmic emulsion Place 0.4 mLs (1 drop total) into both eyes 2 (two) times daily.    GLUCOSAMINE HCL/CHONDR ROSARIO A NA (OSTEO BI-FLEX ORAL) Take 1 tablet by mouth once daily.    HUMALOG KWIKPEN 100 unit/mL InPn pen INJECT 3-4 UNITS INTO THE SKIN THREE TIMES DAILY BEFORE MEALS.    insulin glargine (LANTUS SOLOSTAR) 100 unit/mL (3 mL) InPn pen Inject 40 Units into the skin once daily.    lancets (ACCU-CHEK FASTCLIX) Misc Test 2-3 times daily    levocetirizine (XYZAL) 5 MG tablet Take 1 tablet (5 mg total) by mouth every evening.    levothyroxine (SYNTHROID) 137 MCG Tab tablet Take 2 tablets (274 mcg total) by mouth before breakfast.    meloxicam (MOBIC) 15 MG tablet     metoprolol succinate (TOPROL-XL) 50 MG 24 hr tablet TAKE ONE BY MOUTH TWO TIMES A DAY.    mometasone (NASONEX) 50 mcg/actuation nasal spray 2 sprays by Nasal route once daily.    MV,CA,IRON,MIN/FA/PHYTOSTEROL (CENTRUM SPECIALIST HEART ORAL) Take 1 tablet by mouth 2 (two) times daily.    niacin, inositol niacinate, (NIACIN FLUSH FREE) 400 mg niacin (500 mg) Cap Take 500 mg by mouth every evening.    OMEGA-3 FATTY ACIDS/DHA/EPA (MEGARED PLANT-OMEGA-3 ORAL) Take 1 capsule by mouth once daily.    psyllium (METAMUCIL) 0.52 gram capsule Take by mouth.    RESTASIS 0.05 % ophthalmic emulsion PLACE 1 DROP IN BOTH EYES TWO TIMES A DAY    saw palmetto 80 MG capsule Take 80 mg by mouth 2 (two) times daily.    TWYNSTA 80-10 mg Tab TAKE ONE BY MOUTH EVERY DAY.           Clinical Reference Information           Your Vitals Were     BP Weight BMI          132/74 (BP Location: Left arm, Patient Position: Sitting, BP Method: Manual) 113.2 kg (249 lb 7.2 oz) 37.93 kg/m2        Blood Pressure          Most Recent Value    BP  132/74       Allergies as of 4/4/2017     Iodinated Contrast Media - Iv Dye    Omeprazole    Prilosec [Omeprazole Magnesium]      Immunizations Administered on Date of Encounter - 4/4/2017     None      Orders Placed During Today's Visit      Normal Orders This Visit    POCT urine dipstick without microscope       [facility] Sign-Up     Activating your [facility] account is as easy as 1-2-3!     1) Visit [email or web address], select Sign Up Now, enter this activation code and your date of birth, then select Next.  Activation code not generated  Current Patient Portal Status: Account disabled      2) Create a username and password to use when you visit [facility] in the future and select a security question in case you lose your password and select Next.    3) Enter your e-mail address and click Sign Up!    Additional Information  If you have questions, please e-mail [email or web address] or call [phone] to talk to our [facility] staff. Remember, [facility] is NOT to be used for urgent needs. For medical emergencies, dial 911.         Language Assistance Services     ATTENTION: Language assistance services are available, free of charge. Please call [phone].      ATENCIÓN: Si habla español, tiene a leach disposición servicios gratuitos de asistencia lingüística. Llame al [phone].     CHÚ Ý: N?u b?n nói Ti?ng Vi?t, có các d?ch v? h? tr? ngôn ng? mi?n phí dành cho b?n. G?i s? [phone].         [facility] complies with applicable Federal civil rights laws and does not discriminate on the basis of race, color, national origin, age, disability, or sex.

## 2017-04-04 NOTE — PROGRESS NOTES
"Chief Complaint: Hematospermia    HPI:   4/4/17: The burning symptoms are gone, the fresh blood has abated, and has had a change to some brown color to the semen still.  2/14/17: Two times last week had some hematospermia.  No gross hematuria.  No LUTS.  Has a burning sensation that just started.  Is coming off some Abx for URI (azithromycin/PCN).  Finds a burning sensation in the base of the penis, under scrotum at the end of voiding and then is better but still felt.  Uses a CPAP, nocturia x2-3.  Dr. Roblero checks prostate annually.  8/15: Melissa: "70-year-old male returns to the clinic today for follow-up on his BPH.  At his last visit, we had discussed that restricting his intake of caffeine had significantly helped his daytime symptoms, but he was still having trouble at night with having to get up to urinate.  We discussed monitoring his fluid intake in the evenings even more strictly, and he stated that he would prefer to do that rather than start on medication.  Since then, he has been able to restrict his evening fluid intake even more, and is doing better.  He is not interested in starting any medication at the present time. He is not having any other urinary symptoms or problems, and his urinalysis here in the clinic today is normal."    Allergies:  Iodinated contrast media - iv dye; Omeprazole; and Prilosec [omeprazole magnesium]    Medications:  has a current medication list which includes the following prescription(s): aciphex, albuterol, bd insulin pen needle uf mini, blood sugar diagnostic, budesonide-formoterol 160-4.5 mcg, cyclosporine, glucosamine hcl/chondr leach a na, humalog kwikpen, insulin glargine, lancets, levocetirizine, levothyroxine, meloxicam, metoprolol succinate, mometasone, mv,ca,iron,min/fa/phytosterol, niacin (inositol niacinate), omega-3 fatty acids/dha/epa, psyllium, restasis, saw palmetto, and twynsta.    Review of Systems:  General: No fever, chills, fatigability, or weight " loss.  Skin: No rashes, itching, or changes in color or texture of skin.  Chest: Denies MARCUS, cyanosis, wheezing, cough, and sputum production.  Abdomen: Appetite fine. No weight loss. Denies diarrhea, abdominal pain, hematemesis, or blood in stool.  Musculoskeletal: No joint stiffness or swelling. Denies back pain.  : As above.  All other review of systems negative.    PMH:   has a past medical history of Asthma; BPH (benign prostatic hyperplasia); CAD (coronary artery disease); Cirrhosis; Claudication (4/9/2014); Colon polyp; ED (erectile dysfunction); Ex-smoker; Hearing loss NEC; Hepatitis C; Hyperlipidemia; Hypertension; Hypothyroid; Osteoarthritis; PVD (peripheral vascular disease); Sleep apnea; and Type 2 diabetes mellitus.    PSH:   has a past surgical history that includes Knee surgery; Trigger finger release; Carpal tunnel release; Elbow bursa surgery (2010); Rectal surgery (2012); Eye surgery; Cataract extraction w/ intraocular lens  implant, bilateral (2008); Cataract extraction; Cardiac catheterization; Colonoscopy (8/2013); and Colonoscopy (N/A, 5/30/2016).    FamHx: family history includes Heart disease in his brother, father, and mother. There is no history of Colon cancer.    SocHx:  reports that he quit smoking about 44 years ago. He has a 2.00 pack-year smoking history. He quit smokeless tobacco use about 40 years ago. He reports that he does not drink alcohol or use illicit drugs.      Physical Exam:  Vitals:    04/04/17 1409   BP: 132/74     General: A&Ox3, no apparent distress, no deformities  Neck: No masses, normal thyroid  Lungs: normal inspiration, no use of accessory muscles  Heart: normal pulse, no arrhythmias  Abdomen: Soft, NT, ND  Skin: The skin is warm and dry. No jaundice.  Ext: No c/c/e.  :   2/17: Test desc elodia, no abnormalities of epididymus. Penis normal, with normal penile and scrotal skin. Meatus normal. Normal rectal tone, no hemorrhoids. Prost 20 gm no nodules or masses  appreciated. SV not palpable. Perineum and anus normal.    Labs/Studies:   Urinalysis performed in clinic, summary: UA normal  PSA    3/16: 0.4    Impression/Plan:   1. Likely had a resolved prostatitis.  Expect semen to return to normal over time.  If recurs may cysto and consider finasteride. RTC prn.

## 2017-04-27 ENCOUNTER — TELEPHONE (OUTPATIENT)
Dept: RADIOLOGY | Facility: HOSPITAL | Age: 72
End: 2017-04-27

## 2017-04-28 ENCOUNTER — HOSPITAL ENCOUNTER (OUTPATIENT)
Dept: RADIOLOGY | Facility: HOSPITAL | Age: 72
Discharge: HOME | End: 2017-04-28
Attending: INTERNAL MEDICINE
Payer: MEDICARE

## 2017-04-28 DIAGNOSIS — K74.69 COMPENSATED HCV CIRRHOSIS: ICD-10-CM

## 2017-04-28 DIAGNOSIS — B19.20 COMPENSATED HCV CIRRHOSIS: ICD-10-CM

## 2017-04-28 PROCEDURE — 76705 ECHO EXAM OF ABDOMEN: CPT | Mod: 26,,, | Performed by: RADIOLOGY

## 2017-04-28 PROCEDURE — 76705 ECHO EXAM OF ABDOMEN: CPT | Mod: TC,PO

## 2017-05-05 ENCOUNTER — OFFICE VISIT (OUTPATIENT)
Dept: GASTROENTEROLOGY | Facility: CLINIC | Age: 72
End: 2017-05-05
Payer: MEDICARE

## 2017-05-05 VITALS
WEIGHT: 252.44 LBS | HEIGHT: 68 IN | SYSTOLIC BLOOD PRESSURE: 128 MMHG | BODY MASS INDEX: 38.26 KG/M2 | DIASTOLIC BLOOD PRESSURE: 74 MMHG | HEART RATE: 80 BPM

## 2017-05-05 DIAGNOSIS — E66.09 OBESITY DUE TO EXCESS CALORIES, UNSPECIFIED OBESITY SEVERITY: ICD-10-CM

## 2017-05-05 DIAGNOSIS — K74.60 CIRRHOSIS OF LIVER WITHOUT ASCITES, UNSPECIFIED HEPATIC CIRRHOSIS TYPE: Primary | ICD-10-CM

## 2017-05-05 DIAGNOSIS — R60.0 BILATERAL LEG EDEMA: ICD-10-CM

## 2017-05-05 DIAGNOSIS — I85.10 SECONDARY ESOPHAGEAL VARICES WITHOUT BLEEDING: ICD-10-CM

## 2017-05-05 PROCEDURE — 99999 PR PBB SHADOW E&M-EST. PATIENT-LVL III: CPT | Mod: PBBFAC,,, | Performed by: INTERNAL MEDICINE

## 2017-05-05 PROCEDURE — 99214 OFFICE O/P EST MOD 30 MIN: CPT | Mod: S$GLB,,, | Performed by: INTERNAL MEDICINE
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
## 2017-05-05 NOTE — MR AVS SNAPSHOT
[facility] Gastroenterology  [address] [facility] [place] [address]  Phone: [phone]  Fax: [phone]                  Erendira Pretty   2017 8:00 AM   Office Visit    Description:  Male : 1945   Provider:  Anna Tomas MD   Department:  [facility] - Gastroenterology           Reason for Visit     Cirrhosis           Diagnoses this Visit        Comments    Cirrhosis of liver without ascites, unspecified hepatic cirrhosis type    -  Primary     Secondary esophageal varices without bleeding         Obesity due to excess calories, unspecified obesity severity         Bilateral leg edema                To Do List           Future Appointments        Provider Department Dept Phone    5/15/2017 8:15 AM Alejandro Parkinson MD [facility] [phone]    2017 9:45 AM LABORATORY, [facility] [phone]    2017 2:20 PM Cortes Roblero MD [facility] Internal Medicine [phone]    2017 10:00 AM PULMONARY LAB, [facility]- Pulmonary Function [facility] [phone]    2017 10:20 AM Jose Wren MD [facility] Pulmonary Services [phone]      Goals (5 Years of Data)              Today    4/4/17    4/3/17    Plan meals           Notes - Note created  3/31/2016  4:13 PM by Nikhil Watson MD      Tips to Control Acid Reflux  To control acid reflux, youll need to make some basic diet and lifestyle changes. The simple steps outlined below may be all youll need to relieve discomfort.  Watch What You Eat    · Avoid fatty foods and spicy foods.  · Eat fewer acidic foods, such as citrus and tomato-based foods. These can increase symptoms.  · Limit drinking alcohol, caffeine, and fizzy beverages. All increase acid reflux.  · Try limiting chocolate, peppermint, and spearmint. These can worsen acid reflux in some people.  Watch When You Eat  · Avoid lying down for 3 hours after eating.  · Do not snack before going to bed.  Raise Your Head    Raising your head and upper body by 4 inches to 6  inches helps limit reflux when youre lying down. Put blocks under the head of the bed frame to raise it.    © 4393-6082 [employer]. [address] [place] [address], [place] [address]. All rights reserved. This information is not intended as a substitute for professional medical care. Always follow your healthcare professional's instructions.      GERD (Adult)    The esophagus is a tube that carries food from the mouth to the stomach. A valve at the lower end of the esophagus prevents stomach acid from flowing upward. If this valve does not work properly, acid from the stomach enters the esophagus. If this occurs over and over, the acid will injure the lining of the esophagus.  This condition is called GERD (gastroesophageal reflux disease) or acid reflux. When stomach acid flows upward into the esophagus, it causes burning, pressure or sharp pain in the upper abdomen or mid to lower chest. The pain can spread to the neck, back, or shoulder, similar to heart pain (angina). There may be belching, an acid taste in the back of the throat, chronic cough, or sore throat or hoarseness. GERD symptoms often occur during the day after a big meal, but it can also occur at night when lying down. Smoking, as well as drinking alcohol, increases the risk of GERD.  GERD is a chronic condition. Once it begins, it is often lifelong. Treatment includes changes in eating habits and the use of acid blocker medications to decrease the amount of acid in the stomach.  Symptoms often improve with treatment, but if treatment is stopped, the symptoms usually return after a few months. So most persons with GERD will need to continue treatment.  Home Care:  · Take the prescribed acid blocker medication for the full course of treatment even if you begin to feel better sooner. This medication can take up to several days to fully control your symptoms. If you cant afford the prescribed medication, you can try over-the-counter acid  "blockers, such as Pepcid AC, Tagamet, Zantac, or Aciphex. If these do not relieve your symptoms, a stronger acid-blocker can be tried, such as Prilosec OTC.  · You can use antacids, such as Tums, Rolaids, Mylanta, or Maalox, for pain. This will be useful the first few days after starting acid blockers when the blockers havent started working yet. Follow the directions on the label. Liquid antacids may work better than tablets. Note that antacids can interfere with absorption of certain medications. Specifically, do not take Tagamet (cimetidine), Zantac (ranitidine), or Carafate (sucralfate) within 1 hour of taking an antacid. Talk with your pharmacist if you have any questions.  · Limit or avoid fatty, fried, and spicy foods, as well as coffee, chocolate, mint, and foods with high acid content such as tomatoes and citrus fruit and juices (orange, grapefruit, lemon).  · Avoid alcohol and smoking.  · Dont eat large meals, especially at night. Frequent, smaller meals are best. Do not lie down right after eating. And dont eat anything 3 hours before going to bed.  · If you are overweight, losing weight will reduce symptoms. Women should not wear corsets or girdles because this increases pressure on the stomach and worsens reflux.  · If your symptoms occur during sleep, use a foam wedge to elevate your upper body (not just your head.) Or, place 4" blocks under the head of your bed.  Follow Up  with your doctor or as advised by our staff. Further testing may be needed. If you do not begin to improve over the next 4 days, contact your doctor. If you had an x-ray, CT scan, or ECG (electrocardiogram), it will be reviewed by a specialist. Youll be notified of any new findings that affect your care.  Get Prompt Medical Attention  if any of the following occur:  · Stomach pain gets worse or moves to the lower right abdomen (appendix area)  · Chest pain appears or gets worse, or spreads to the back, neck, shoulder, or " arm  · Frequent vomiting (cant keep down liquids)  · Blood in the stool or vomit (red or black in color)  · Feeling weak or dizzy, fainting, or trouble breathing  · Fever of 100.4ºF (38ºC) or higher, or as directed by your healthcare provider  © 5586-2737 [employer]. [address] [place] [address]. All rights reserved. This information is not intended as a substitute for professional medical care. Always follow your healthcare professional's instructions.              Weight < 90.719 kg (200 lb)   114.5 kg (252 lb 6.8 oz)  113.2 kg (249 lb 7.2 oz)  113 kg (249 lb 1.9 oz)    Notes - Note created  3/31/2016  4:12 PM by Nikhil Watson MD      Diabetes: Understanding Carbohydrates, Fats, and Protein  Food is a source of fuel and nourishment for your body. Its also a source of pleasure. Having diabetes doesnt mean you have to eat special foods or give up desserts. Instead, your dietitian can show you how to plan meals to suit your body. To start, learn how different foods affect blood sugar.    Carbohydrates  Carbohydrates are the main source of fuel for the body. Carbohydrates raise blood sugar. Many people think carbohydrates are only found in pasta or bread. But carbohydrates are actually in many kinds of foods:  · Sugars occur naturally in foods such as fruit, milk, honey, and molasses. Sugars can also be added to many foods, from cereals and yogurt to candy and desserts. Sugars raise blood sugar.  · Starches are found in bread, cereals, pasta, and dried beans. Theyre also found in corn, peas, potatoes, yam, acorn squash, and butternut squash. Starches also raise blood sugar.   · Fiber is found in foods such as vegetables, fruits, and whole grains. Unlike other carbs, fiber isnt digested or absorbed. So it doesnt raise blood sugar. In fact, fiber can help keep blood sugar from rising too fast. It also helps keep blood cholesterol at a healthy level.     Did You Know?  Even though  carbohydrates raise blood sugar, its best to have some in every meal. They are an important part of a healthy diet.   Fat  Fat is an energy source that can be stored until needed. Fat does not raise blood sugar. However, it can raise blood cholesterol, increasing the risk of heart disease. Fat is also high in calories, which can cause weight gain. Not all types of fat are the same.  More Healthy:  · Monounsaturated fats are mostly found in vegetable oils, such as olive, canola, and peanut oils. They are also found in avocados and some nuts. Monounsaturated fats are healthy for your heart. Thats because they lower LDL (unhealthy) cholesterol.  · Polyunsaturated fats are mostly found in vegetable oils, such as corn, safflower, and soybean oils. They are also found in some seeds, nuts, and fish. Polyunsaturated fats lower LDL (unhealthy) cholesterol. So, choosing them instead of saturated fats is healthy for your heart. Certain unsaturated fats can help lower triglycerides.   Less Healthy:  · Saturated fats are found in animal products, such as meat, poultry, whole milk, lard, and butter. Saturated fats raise LDL cholesterol and are not healthy for your heart.  · Hydrogenated oils and trans fats are formed when vegetable oils are processed into solid fats. They are found in many processed foods. Hydrogenated oils and trans fats raise LDL cholesterol and lower HDL (healthy) cholesterol. They are not healthy for your heart.  Protein  Protein helps the body build and repair muscle and other tissue. Protein has little or no effect on blood sugar. However, many foods that contain protein also contain saturated fat. By choosing low-fat protein sources, you can get the benefits of protein without the extra fat:  · Plant protein is found in dry beans and peas, nuts, and soy products, such as tofu and soymilk. These sources tend to be cholesterol-free and low in saturated fat.  · Animal protein is found in fish, poultry,  meat, cheese, milk, and eggs. These contain cholesterol and can be high in saturated fat. Aim for lean, lower-fat choices.  © 3200-7333 [employer]. [address] [place] [address], [place] [address]. All rights reserved. This information is not intended as a substitute for professional medical care. Always follow your healthcare professional's instructions.        Healthy Meals for Diabetes  Ask your health care team to help you make a meal plan that fits your needs. Your meal plan tells you when to eat your meals and snacks, what kinds of foods to eat, and how much of each food to eat. You dont have to give up all the foods you like. But you do need to follow some guidelines.  Eat foods rich in fiber  Fiber is a carbohydrate that breaks down slowly. Fiber is also healthy for your heart. Fiber-rich foods include:        Choose healthy protein foods  Eating protein that is low in fat can help you control your weight. It also helps keep your heart healthy. Low-fat protein foods include:  · Fish  · Plant proteins, such as dry beans and peas, nuts, and soy products like tofu and soymilk  · Lean meat with all visible fat removed  · Poultry with the skin removed  · Low-fat or nonfat milk, cheese, and yogurt  Limit unhealthy fats and sugar  Saturated and trans fats are unhealthy for your heart. They raise LDL (bad) cholesterol. Fat is also high in calories, so it can make you gain weight. To cut down on unhealthy fats and sugar, limit these foods:        How much to eat  The amount of food you eat affects your blood sugar. It also affects your weight. Your health care team will tell you how much of each type of food you should eat.  · Use measuring cups and spoons and a food scale to measure serving sizes.  · Learn what a correct serving size looks like on your plate. This will help when you are away from home and cant measure your servings.  · Eat only the number of servings given on your meal plan for each food.  Dont take seconds.  When to eat  Your meal plan will likely include breakfast, lunch, dinner, and some snacks.  · Try to eat your meals and snacks at about the same times each day.  · Eat all your meals and snacks. Skipping a meal or snack can make your blood sugar drop too low. It can also cause you to eat too much at the next meal or snack. Then your blood sugar could get too high.  © 7259-3419 [employer]. [address] [place] [address], [place] [address]. All rights reserved. This information is not intended as a substitute for professional medical care. Always follow your healthcare professional's instructions.      Metabolic Syndrome: Losing Excess Weight  Metabolic syndrome is a set of 5 health factors that can lead to serious health problems. The factors greatly increase your risk for diabetes, heart attack, or stroke. Extra weight with a large waist is one of the factors for metabolic syndrome. Being overweight or obese means that you weigh too much for what is healthy for your height. A large waist size is 40 inches or more for men, and 35 inches or more for women.  But you can take steps to lose weight and lower your risk for serious health problems.  Benefits of weight loss  Even with a small weight loss, you may have more energy and feel better. Losing even a small amount of weight can affect your blood pressure, triglycerides, HDL cholesterol, and blood sugar. You may be able to take less medicine for blood pressure, cholesterol, or blood sugar. Or you may be able to stop taking medicine. As you lose weight, your risk for diabetes, heart attack, and stroke will get lower.  Getting started  The best way to lose weight is to do it gradually. For example, lose 1/2 to 1 pound a week. You will need to be more active and eat healthier foods. Make sure to:  · Exercise every day. Talk with your health care provider to make sure it is safe for you to exercise. Make sure you start slowly. Begin with 10 to  15 minutes of activity. Try to exercise or be active for at least 30 minutes most days of the week. You can exercise all at once or break it up into 10- or 15-minute sessions. And think about other ways you can be more active throughout the day.  · Eat healthy foods. Most successful dieters make changes in what, when, and how much they eat. The best way to lose weight is to eat fewer calories. You should make sure you check your portion sizes, eat breakfast, plan your meals and snacks, and eat slowly.  Working with your health care provider  Talk with your health care provider. He or she can guide you through the process of losing weight. As you begin to make changes, your health care provider will:  · Check your weight loss progress  · Check your blood pressure and blood test results  · Talk with you about your results  · Make suggestions about diet and exercise  · Recommend other experts or programs  · Make changes to your medicines and help with any side effects  Getting additional support  It can be hard to make healthy lifestyle changes. It may take some time to create new habits.  Your health care provider may suggest other experts or programs to help you, such as:  · Health . A health  gives ongoing support and makes suggestions to help you with healthy lifestyle changes, like weight loss.  · Weight loss programs. There are many safe weight loss programs. Some are free or low-cost.  · Dietician. He or she can help you make changes to your diet.  · Exercise specialist. He or she can help you with an exercise plan.  · Counselor. A counselor can help you deal with your feelings and emotions. There are psychiatrists, psychologists, and social workers who specialize in weight problems.  · Bariatric or obesity specialist.  These health care providers are experts in obesity. They can help with diet, exercise, behavioral therapy or counseling, medicines for weight loss, and very low-calorie  diets.  · Bariatric surgeon. Weight loss surgery may be a choice. But it is only advised for people who are over a certain weight, who have health problems because of their weight, and who have not been able to lose weight with other treatments.  Keeping the weight off  After losing weight, keeping it off can be even harder. Dont give up. Make sure to:  · Keep exercising. That means at least 40 to 60 minutes most days of the week.  · Keep eating healthy foods. Continue eating foods that are healthy and avoiding those that arent.  · Stay motivated. Watch your health improve. If you eat something unhealthy or skip exercising, dont give up. Simply make your next choice a healthy one.  © 5042-9661 [employer]. [address] [place] [address], [place] [address]. All rights reserved. This information is not intended as a substitute for professional medical care. Always follow your healthcare professional's instructions.                Follow-Up and Disposition     Return in about 6 months (around 11/5/2017).    Follow-up and Disposition History      [facility]     [facility] Care [facility] - 24/7 Assistance  Unless otherwise directed by your provider, please contact [facility], our nurse care line that is available for 24/7 assistance.     Registered nurses in the [facility] provide: appointment scheduling, clinical advisement, health education, and other advisory services.  Call: [phone] (toll free)               Medications           Message regarding Medications     Verify the changes and/or additions to your medication regime listed below are the same as discussed with your clinician today.  If any of these changes or additions are incorrect, please notify your healthcare provider.             Verify that the below list of medications is an accurate representation of the medications you are currently taking.  If none reported, the list may be blank. If incorrect, please  "contact your healthcare provider. Carry this list with you in case of emergency.           Current Medications     ACIPHEX 20 mg tablet TAKE 1 TABLET BY MOUTH TWO TIMES A DAY    albuterol 90 mcg/actuation inhaler Inhale 2 puffs into the lungs every 6 (six) hours.    BD INSULIN PEN NEEDLE UF MINI 31 x 3/16 " Ndle USE AS DIRECTED    blood sugar diagnostic (BLOOD GLUCOSE TEST) Strp 1 strip by Misc.(Non-Drug; Combo Route) route 3 (three) times daily. accu-chek lux plus    budesonide-formoterol 160-4.5 mcg (SYMBICORT) 160-4.5 mcg/actuation HFAA Inhale 2 puffs into the lungs 2 (two) times daily.    cycloSPORINE (RESTASIS) 0.05 % ophthalmic emulsion Place 0.4 mLs (1 drop total) into both eyes 2 (two) times daily.    GLUCOSAMINE HCL/CHONDR ROSARIO A NA (OSTEO BI-FLEX ORAL) Take 1 tablet by mouth once daily.    HUMALOG KWIKPEN 100 unit/mL InPn pen INJECT 3-4 UNITS INTO THE SKIN THREE TIMES DAILY BEFORE MEALS.    insulin glargine (LANTUS SOLOSTAR) 100 unit/mL (3 mL) InPn pen Inject 40 Units into the skin once daily.    lancets (ACCU-CHEK FASTCLIX) Misc Test 2-3 times daily    levocetirizine (XYZAL) 5 MG tablet Take 1 tablet (5 mg total) by mouth every evening.    levothyroxine (SYNTHROID) 137 MCG Tab tablet Take 2 tablets (274 mcg total) by mouth before breakfast.    meloxicam (MOBIC) 15 MG tablet     metoprolol succinate (TOPROL-XL) 50 MG 24 hr tablet TAKE ONE BY MOUTH TWO TIMES A DAY.    mometasone (NASONEX) 50 mcg/actuation nasal spray 2 sprays by Nasal route once daily.    MV,CA,IRON,MIN/FA/PHYTOSTEROL (CENTRUM SPECIALIST HEART ORAL) Take 1 tablet by mouth 2 (two) times daily.    niacin, inositol niacinate, (NIACIN FLUSH FREE) 400 mg niacin (500 mg) Cap Take 500 mg by mouth every evening.    OMEGA-3 FATTY ACIDS/DHA/EPA (MEGARED PLANT-OMEGA-3 ORAL) Take 1 capsule by mouth once daily.    psyllium (METAMUCIL) 0.52 gram capsule Take by mouth.    RESTASIS 0.05 % ophthalmic emulsion PLACE 1 DROP IN BOTH EYES TWO TIMES A DAY    saw " "palmetto 80 MG capsule Take 80 mg by mouth 2 (two) times daily.    TWYNSTA 80-10 mg Tab TAKE ONE BY MOUTH EVERY DAY.           Clinical Reference Information           Your Vitals Were     BP Pulse Height Weight BMI    128/74 80 5' 8" (1.727 m) 114.5 kg (252 lb 6.8 oz) 38.38 kg/m2      Blood Pressure          Most Recent Value    BP  128/74      Allergies as of 5/5/2017     Iodinated Contrast Media - Oral And Iv Dye    Omeprazole    Prilosec [Omeprazole Magnesium]      Immunizations Administered on Date of Encounter - 5/5/2017     None      Orders Placed During Today's Visit      Normal Orders This Visit    Case request GI: ESOPHAGOGASTRODUODENOSCOPY (EGD)       [facility] Sign-Up     Activating your [facility] account is as easy as 1-2-3!     1) Visit [email or web address], select Sign Up Now, enter this activation code and your date of birth, then select Next.  Activation code not generated  Current Patient Portal Status: Account disabled      2) Create a username and password to use when you visit [facility] in the future and select a security question in case you lose your password and select Next.    3) Enter your e-mail address and click Sign Up!    Additional Information  If you have questions, please e-mail [email or web address] or call [phone] to talk to our [facility] staff. Remember, [facility] is NOT to be used for urgent needs. For medical emergencies, dial 911.         Language Assistance Services     ATTENTION: Language assistance services are available, free of charge. Please call [phone].      ATENCIÓN: Si habla español, tiene a leach disposición servicios gratuitos de asistencia lingüística. Llame al [phone].     CHÚ Ý: N?u b?n nói Ti?ng Vi?t, có các d?ch v? h? tr? ngôn ng? mi?n phí dành cho b?n. G?i s? [phone].         [facility] complies with applicable Federal civil rights laws and does not discriminate on the basis of race, color, national origin, age, disability, or " sex.

## 2017-05-09 RX ORDER — CYCLOSPORINE 0.5 MG/ML
EMULSION OPHTHALMIC
Qty: 60 EACH | Refills: 0 | Status: ON HOLD | OUTPATIENT
Start: 2017-05-09 | End: 2018-11-02

## 2017-05-09 RX ORDER — LEVOTHYROXINE SODIUM 137 UG/1
TABLET ORAL
Qty: 60 TABLET | Refills: 5 | Status: SHIPPED | OUTPATIENT
Start: 2017-05-09 | End: 2017-12-04 | Stop reason: SDUPTHER

## 2017-05-15 ENCOUNTER — OFFICE VISIT (OUTPATIENT)
Dept: OTOLARYNGOLOGY | Facility: CLINIC | Age: 72
End: 2017-05-15
Payer: MEDICARE

## 2017-05-15 VITALS
BODY MASS INDEX: 38.15 KG/M2 | HEART RATE: 64 BPM | HEIGHT: 68 IN | TEMPERATURE: 98 F | RESPIRATION RATE: 18 BRPM | DIASTOLIC BLOOD PRESSURE: 80 MMHG | WEIGHT: 251.75 LBS | SYSTOLIC BLOOD PRESSURE: 147 MMHG

## 2017-05-15 DIAGNOSIS — J32.4 CHRONIC PANSINUSITIS: Primary | ICD-10-CM

## 2017-05-15 DIAGNOSIS — J33.9 MULTIPLE NASAL POLYPS: ICD-10-CM

## 2017-05-15 DIAGNOSIS — J30.9 ALLERGIC RHINITIS, UNSPECIFIED ALLERGIC RHINITIS TRIGGER, UNSPECIFIED RHINITIS SEASONALITY: ICD-10-CM

## 2017-05-15 PROCEDURE — 99999 PR PBB SHADOW E&M-EST. PATIENT-LVL III: CPT | Mod: PBBFAC,,, | Performed by: OTOLARYNGOLOGY

## 2017-05-15 PROCEDURE — 99214 OFFICE O/P EST MOD 30 MIN: CPT | Mod: 25,S$GLB,, | Performed by: OTOLARYNGOLOGY

## 2017-05-15 PROCEDURE — 31231 NASAL ENDOSCOPY DX: CPT | Mod: S$GLB,,, | Performed by: OTOLARYNGOLOGY

## 2017-05-15 RX ORDER — DEXAMETHASONE SODIUM PHOSPHATE 4 MG/ML
8 INJECTION, SOLUTION INTRA-ARTICULAR; INTRALESIONAL; INTRAMUSCULAR; INTRAVENOUS; SOFT TISSUE ONCE
Status: DISCONTINUED | OUTPATIENT
Start: 2017-05-15 | End: 2018-11-02

## 2017-05-15 RX ORDER — LEVOFLOXACIN 500 MG/1
500 TABLET, FILM COATED ORAL DAILY
Qty: 14 TABLET | Refills: 0 | Status: SHIPPED | OUTPATIENT
Start: 2017-05-15 | End: 2017-05-29

## 2017-05-15 NOTE — PROGRESS NOTES
Referring Provider:    No referring provider defined for this encounter.  Subjective:   Patient: Erendira Pretty [id number], :1945   Visit date:5/15/2017 2:26 PM    Chief Complaint:  Follow-up and Sore Throat (x 3 days)    HPI:  Erendira is a 71 y.o. male who I was asked to see in consultation for evaluation of the following issue(s):    Patient was initially seen in 2017.  He reported significant nasal obstruction worse on the left than the right.  He also reported facial pain, pressure and discomfort.  He had had nasal polypectomy performed twice with the last one in about the year .  He had never had ALLERGY testing performed.  He reported moderate ALLERGIC symptoms of nasal congestion, nasal itchiness and rhinorrhea.  No imaging had been performed prior to seeing me.  He had been started on Augmentin shortly before his initial clinic visit and was having significant gastrointestinal issues with this.  He does have asthma.  Nasal endoscopy revealed bilateral nasal polyps with a very large polyp on the left side.  This was removed.  I placed him on levofloxacin, Nasonex twice a day and Astelin twice a day.  He was not given steroids due to his diabetes.      He return to clinic 2017.  He reports that his nasal obstruction is significantly better.  Additionally he is no longer having facial pain or pressure.  He denies significant nasal drainage.  He is having a persistent cough that is been lingering for about 2 months now.  He was on Singulair in the past but has been off of that for some time.  He is having to use his inhalers, specifically his albuterol inhaler multiple times daily.  ALLERGY testing by blood work was performed and revealed numerous class III ALLERGIES.  Erendira returned April 3, 2017.  I had directed the patient to use Nasonex, Astelin, Xyzal and Singulair.  He stopped using the nasal sprays about 1 month ago but continued oral medications.  Despite this, he reported  that his breathing through the nose had dramatically improved.  He had no facial pressure or pain.  He had minimal nasal drainage.  His cough had resolved.  Overall he was feeling very good.  Then plan at this point, was to resume Nasonex and continue with Xyzal and Singulair.     When he returned May 15, 2017, he had been using Nasonex, Xyzal and Singulair for his allergy regimen. Over the past week, he has had increased post nasal and cough.  The cough somewhat improved but the sore throat has gotten worse.  The post nasal drip has been worsening over the same period of time but may be mildly better in the past 2 days.  The sore throat is the most bothersome issue at this point and he has some difficulty swallowing.  Severity- moderate to severe. Quality- burning.  Modifying factors- none, no improvement with choraseptic spray.  Assoc ssx- nasal drainage, cough        FINAL PATHOLOGIC DIAGNOSIS  SOFT TISSUE, LEFT NASAL CAVITY (EXCISION):  Benign respiratory mucosa with abundant eosinophils (up to 80 per 1 high-power field)  Diagnosed by: Fiona Zamora M.D.  (Electronically Signed: 2017-01-11 12:05:27)    Review of Systems:  -     Allergic/Immunologic: is allergic to iodinated contrast media - oral and iv dye; omeprazole; and prilosec [omeprazole magnesium]..  -     Constitutional: Current temp: 98 °F (36.7 °C) (Tympanic)      His meds, allergies, medical, surgical, social & family histories were reviewed & updated:  -     He has a current medication list which includes the following prescription(s): aciphex, albuterol, bd insulin pen needle uf mini, blood sugar diagnostic, budesonide-formoterol 160-4.5 mcg, cyclosporine, glucosamine hcl/chondr leach a na, humalog kwikpen, insulin glargine, lancets, levocetirizine, meloxicam, metoprolol succinate, mometasone, mv,ca,iron,min/fa/phytosterol, niacin (inositol niacinate), omega-3 fatty acids/dha/epa, psyllium, restasis, saw palmetto, synthroid, twynsta, and levofloxacin, and  "the following Facility-Administered Medications: dexamethasone.  -     He  has a past medical history of Asthma; BPH (benign prostatic hyperplasia); CAD (coronary artery disease); Cirrhosis; Claudication (4/9/2014); Colon polyp; ED (erectile dysfunction); Ex-smoker; Hearing loss NEC; Hepatitis C; Hyperlipidemia; Hypertension; Hypothyroid; Osteoarthritis; PVD (peripheral vascular disease); Sleep apnea; and Type 2 diabetes mellitus.   -     He  does not have any pertinent problems on file.   -     He  has a past surgical history that includes Knee surgery; Trigger finger release; Carpal tunnel release; Elbow bursa surgery (2010); Rectal surgery (2012); Eye surgery; Cataract extraction w/ intraocular lens  implant, bilateral (2008); Cataract extraction; Cardiac catheterization; Colonoscopy (8/2013); and Colonoscopy (N/A, 5/30/2016).  -     He  reports that he quit smoking about 44 years ago. He has a 2.00 pack-year smoking history. He quit smokeless tobacco use about 40 years ago. He reports that he does not drink alcohol or use illicit drugs.  -     His family history includes Heart disease in his brother, father, and mother. There is no history of Colon cancer.  -     He is allergic to iodinated contrast media - oral and iv dye; omeprazole; and prilosec [omeprazole magnesium].    Objective:     Physical Exam:  Vitals:    BP (!) 147/80  Pulse 64  Temp 98 °F (36.7 °C) (Tympanic)   Resp 18  Ht 5' 8" (1.727 m)  Wt 114.2 kg (251 lb 12.3 oz)  BMI 38.28 kg/m2  General appearance:  Well developed, well nourished    Eyes:  Extraocular motions intact, PERRL    Communication:  no hoarseness, no dysphonia    Ears:  Otoscopy of external auditory canals and tympanic membranes was normal, clinical speech reception thresholds grossly intact, no mass/lesion of auricle.  Nose:  No masses/lesions of external nose.  No anterior septal deviation. Anterior rhinoscopy unremarkable.  Mouth:  No mass/lesion of lips, teeth, gums, " hard/soft palate, tongue, tonsils, or oropharynx.    Cardiovascular:  No pedal edema; Radial Pulses +2     Neck & Lymphatics:  No cervical lymphadenopathy, no neck mass/crepitus/ asymmetry, trachea is midline, no thyroid enlargement/tenderness/mass.    Psych: Oriented x3,  Alert with normal mood and affect.     Respiration/Chest:  Symmetric expansion during respiration, normal respiratory effort.    Skin:  Warm and intact. No ulcerations of face, scalp, neck.    Assessment & Plan:   Erendira was seen today for follow-up and sore throat.    Diagnoses and all orders for this visit:    Chronic pansinusitis  -     dexamethasone injection 8 mg; Inject 2 mLs (8 mg total) into the muscle once.  -     levoFLOXacin (LEVAQUIN) 500 MG tablet; Take 1 tablet (500 mg total) by mouth once daily.    Multiple nasal polyps  -     dexamethasone injection 8 mg; Inject 2 mLs (8 mg total) into the muscle once.  -     levoFLOXacin (LEVAQUIN) 500 MG tablet; Take 1 tablet (500 mg total) by mouth once daily.    Allergic rhinitis, unspecified allergic rhinitis trigger, unspecified rhinitis seasonality  -     dexamethasone injection 8 mg; Inject 2 mLs (8 mg total) into the muscle once.  -     levoFLOXacin (LEVAQUIN) 500 MG tablet; Take 1 tablet (500 mg total) by mouth once daily.    Sore throat/purulence on the left/dysphagia- levaquin and dexamethasone shot.  Discussed need to check blood glucose more frequently over the next 48 hours  Allergic rhinitis/polyps- continue Nasonex, Xyzal, Singulair  Follow up 6 months or earlier if problems arise      Patient: Erendira Pretty [id number], :1945  Procedure date:5/15/2017  Patient's medications, allergies, past medical, surgical, social and family histories were reviewed and updated as appropriate.  Chief Complaint:  Follow-up and Sore Throat (x 3 days)    HPI:  Erendira is a 71 y.o. male with the history of present illness as discussed in the clinic note from today.    Procedure: Risks, benefits,  and alternatives of the procedure were discussed with the patient, and the patient consented to the nasal endoscopy.  The nasal cavity was sprayed with a topical decongestant and anesthetic (if needed). The endoscope was passed into each nostril and each nasal cavity was visualized.  On each side the nasal cavity, sinuses (if open), turbinates, and septum were examined with the findings described below. At the end of the examination, the scope was removed. The patient tolerated the procedure well with no complications.       Endoscopic Exam Findings:  -     Nasal secretions: small amount of thick mucus/purulence from left maxillary   -     Nasal septum: no significant deviation and no perforation appreciated   -     Inferior turbinate: - normal mucosa without significant hypertrophy - bilaterally  -     Middle turbinate: polypoid changes on the medial surface on the right  -     Other findings: small polyps in the middle meatus on the left    -     Laryngeal mucosa is erythematous  -     Posterior commissure has mild  hypertrophy  -     Lingual tonsils have mild  hypertrophy  -     Adenoids have no  hypertrophy  -     Right vocal fold: normal mobility     mass/lesion: none  -     Left vocal fold: normal mobility     mass/lesion: none  -     Other findings: none    Assessment & Plan:  - see today's clinic note

## 2017-05-15 NOTE — MR AVS SNAPSHOT
[facility] ENT  [address] [address]  Phone: [phone]  Fax: [phone]                  Erendira Pretty   5/15/2017 8:15 AM   Office Visit    Description:  Male : 1945   Provider:  Alejandro Parkinson MD   Department:  [facility] - ENT           Reason for Visit     Follow-up     Sore Throat           Diagnoses this Visit        Comments    Chronic pansinusitis    -  Primary     Multiple nasal polyps         Allergic rhinitis, unspecified allergic rhinitis trigger, unspecified rhinitis seasonality                To Do List           Future Appointments        Provider Department Dept Phone    2017 9:50 AM LABORATORY, [facility] [phone]    2017 2:20 PM Cortes Roblero MD [facility] Internal Medicine [phone]    2017 10:00 AM PULMONARY LAB, [facility] Pulmonary Function [facility] [phone]    2017 10:20 AM Jose Wren MD [facility] Pulmonary Services [phone]    2017 9:00 AM Alejandro Parkinson MD [facility] -[phone]      Your Future Surgeries/Procedures     2017   Surgery with Anna Tomas MD   [facility]  ([facility])    [address] [facility] [address] [facility] [address] [phone]              Goals (5 Years of Data)              Today    17    Plan meals           Notes - Note created  3/31/2016  4:13 PM by Nikhil Watson MD      Tips to Control Acid Reflux  To control acid reflux, youll need to make some basic diet and lifestyle changes. The simple steps outlined below may be all youll need to relieve discomfort.  Watch What You Eat    · Avoid fatty foods and spicy foods.  · Eat fewer acidic foods, such as citrus and tomato-based foods. These can increase symptoms.  · Limit drinking alcohol, caffeine, and fizzy beverages. All increase acid reflux.  · Try limiting chocolate, peppermint, and spearmint. These can worsen acid reflux in some people.  Watch When You Eat  · Avoid  lying down for 3 hours after eating.  · Do not snack before going to bed.  Raise Your Head    Raising your head and upper body by 4 inches to 6 inches helps limit reflux when youre lying down. Put blocks under the head of the bed frame to raise it.    © 1046-2946 [employer]. [address] [place] [address]. All rights reserved. This information is not intended as a substitute for professional medical care. Always follow your healthcare professional's instructions.      GERD (Adult)    The esophagus is a tube that carries food from the mouth to the stomach. A valve at the lower end of the esophagus prevents stomach acid from flowing upward. If this valve does not work properly, acid from the stomach enters the esophagus. If this occurs over and over, the acid will injure the lining of the esophagus.  This condition is called GERD (gastroesophageal reflux disease) or acid reflux. When stomach acid flows upward into the esophagus, it causes burning, pressure or sharp pain in the upper abdomen or mid to lower chest. The pain can spread to the neck, back, or shoulder, similar to heart pain (angina). There may be belching, an acid taste in the back of the throat, chronic cough, or sore throat or hoarseness. GERD symptoms often occur during the day after a big meal, but it can also occur at night when lying down. Smoking, as well as drinking alcohol, increases the risk of GERD.  GERD is a chronic condition. Once it begins, it is often lifelong. Treatment includes changes in eating habits and the use of acid blocker medications to decrease the amount of acid in the stomach.  Symptoms often improve with treatment, but if treatment is stopped, the symptoms usually return after a few months. So most persons with GERD will need to continue treatment.  Home Care:  · Take the prescribed acid blocker medication for the full course of treatment even if you begin to feel better sooner. This medication can  "take up to several days to fully control your symptoms. If you cant afford the prescribed medication, you can try over-the-counter acid blockers, such as Pepcid AC, Tagamet, Zantac, or Aciphex. If these do not relieve your symptoms, a stronger acid-blocker can be tried, such as Prilosec OTC.  · You can use antacids, such as Tums, Rolaids, Mylanta, or Maalox, for pain. This will be useful the first few days after starting acid blockers when the blockers havent started working yet. Follow the directions on the label. Liquid antacids may work better than tablets. Note that antacids can interfere with absorption of certain medications. Specifically, do not take Tagamet (cimetidine), Zantac (ranitidine), or Carafate (sucralfate) within 1 hour of taking an antacid. Talk with your pharmacist if you have any questions.  · Limit or avoid fatty, fried, and spicy foods, as well as coffee, chocolate, mint, and foods with high acid content such as tomatoes and citrus fruit and juices (orange, grapefruit, lemon).  · Avoid alcohol and smoking.  · Dont eat large meals, especially at night. Frequent, smaller meals are best. Do not lie down right after eating. And dont eat anything 3 hours before going to bed.  · If you are overweight, losing weight will reduce symptoms. Women should not wear corsets or girdles because this increases pressure on the stomach and worsens reflux.  · If your symptoms occur during sleep, use a foam wedge to elevate your upper body (not just your head.) Or, place 4" blocks under the head of your bed.  Follow Up  with your doctor or as advised by our staff. Further testing may be needed. If you do not begin to improve over the next 4 days, contact your doctor. If you had an x-ray, CT scan, or ECG (electrocardiogram), it will be reviewed by a specialist. Youll be notified of any new findings that affect your care.  Get Prompt Medical Attention  if any of the following occur:  · Stomach pain gets worse " or moves to the lower right abdomen (appendix area)  · Chest pain appears or gets worse, or spreads to the back, neck, shoulder, or arm  · Frequent vomiting (cant keep down liquids)  · Blood in the stool or vomit (red or black in color)  · Feeling weak or dizzy, fainting, or trouble breathing  · Fever of 100.4ºF (38ºC) or higher, or as directed by your healthcare provider  © 5600-4944 [employer]. [address] [place] [address]. All rights reserved. This information is not intended as a substitute for professional medical care. Always follow your healthcare professional's instructions.              Weight < 90.719 kg (200 lb)   114.2 kg (251 lb 12.3 oz)  114.5 kg (252 lb 6.8 oz)  113.2 kg (249 lb 7.2 oz)    Notes - Note created  3/31/2016  4:12 PM by Nikhil Watson MD      Diabetes: Understanding Carbohydrates, Fats, and Protein  Food is a source of fuel and nourishment for your body. Its also a source of pleasure. Having diabetes doesnt mean you have to eat special foods or give up desserts. Instead, your dietitian can show you how to plan meals to suit your body. To start, learn how different foods affect blood sugar.    Carbohydrates  Carbohydrates are the main source of fuel for the body. Carbohydrates raise blood sugar. Many people think carbohydrates are only found in pasta or bread. But carbohydrates are actually in many kinds of foods:  · Sugars occur naturally in foods such as fruit, milk, honey, and molasses. Sugars can also be added to many foods, from cereals and yogurt to candy and desserts. Sugars raise blood sugar.  · Starches are found in bread, cereals, pasta, and dried beans. Theyre also found in corn, peas, potatoes, yam, acorn squash, and butternut squash. Starches also raise blood sugar.   · Fiber is found in foods such as vegetables, fruits, and whole grains. Unlike other carbs, fiber isnt digested or absorbed. So it doesnt raise blood sugar. In fact, fiber can  help keep blood sugar from rising too fast. It also helps keep blood cholesterol at a healthy level.     Did You Know?  Even though carbohydrates raise blood sugar, its best to have some in every meal. They are an important part of a healthy diet.   Fat  Fat is an energy source that can be stored until needed. Fat does not raise blood sugar. However, it can raise blood cholesterol, increasing the risk of heart disease. Fat is also high in calories, which can cause weight gain. Not all types of fat are the same.  More Healthy:  · Monounsaturated fats are mostly found in vegetable oils, such as olive, canola, and peanut oils. They are also found in avocados and some nuts. Monounsaturated fats are healthy for your heart. Thats because they lower LDL (unhealthy) cholesterol.  · Polyunsaturated fats are mostly found in vegetable oils, such as corn, safflower, and soybean oils. They are also found in some seeds, nuts, and fish. Polyunsaturated fats lower LDL (unhealthy) cholesterol. So, choosing them instead of saturated fats is healthy for your heart. Certain unsaturated fats can help lower triglycerides.   Less Healthy:  · Saturated fats are found in animal products, such as meat, poultry, whole milk, lard, and butter. Saturated fats raise LDL cholesterol and are not healthy for your heart.  · Hydrogenated oils and trans fats are formed when vegetable oils are processed into solid fats. They are found in many processed foods. Hydrogenated oils and trans fats raise LDL cholesterol and lower HDL (healthy) cholesterol. They are not healthy for your heart.  Protein  Protein helps the body build and repair muscle and other tissue. Protein has little or no effect on blood sugar. However, many foods that contain protein also contain saturated fat. By choosing low-fat protein sources, you can get the benefits of protein without the extra fat:  · Plant protein is found in dry beans and peas, nuts, and soy products, such as  tofu and soymilk. These sources tend to be cholesterol-free and low in saturated fat.  · Animal protein is found in fish, poultry, meat, cheese, milk, and eggs. These contain cholesterol and can be high in saturated fat. Aim for lean, lower-fat choices.  © 6432-0296 [employer]. [address] [place] [address], [place] [address]. All rights reserved. This information is not intended as a substitute for professional medical care. Always follow your healthcare professional's instructions.        Healthy Meals for Diabetes  Ask your health care team to help you make a meal plan that fits your needs. Your meal plan tells you when to eat your meals and snacks, what kinds of foods to eat, and how much of each food to eat. You dont have to give up all the foods you like. But you do need to follow some guidelines.  Eat foods rich in fiber  Fiber is a carbohydrate that breaks down slowly. Fiber is also healthy for your heart. Fiber-rich foods include:        Choose healthy protein foods  Eating protein that is low in fat can help you control your weight. It also helps keep your heart healthy. Low-fat protein foods include:  · Fish  · Plant proteins, such as dry beans and peas, nuts, and soy products like tofu and soymilk  · Lean meat with all visible fat removed  · Poultry with the skin removed  · Low-fat or nonfat milk, cheese, and yogurt  Limit unhealthy fats and sugar  Saturated and trans fats are unhealthy for your heart. They raise LDL (bad) cholesterol. Fat is also high in calories, so it can make you gain weight. To cut down on unhealthy fats and sugar, limit these foods:        How much to eat  The amount of food you eat affects your blood sugar. It also affects your weight. Your health care team will tell you how much of each type of food you should eat.  · Use measuring cups and spoons and a food scale to measure serving sizes.  · Learn what a correct serving size looks like on your plate. This will help when  you are away from home and cant measure your servings.  · Eat only the number of servings given on your meal plan for each food. Dont take seconds.  When to eat  Your meal plan will likely include breakfast, lunch, dinner, and some snacks.  · Try to eat your meals and snacks at about the same times each day.  · Eat all your meals and snacks. Skipping a meal or snack can make your blood sugar drop too low. It can also cause you to eat too much at the next meal or snack. Then your blood sugar could get too high.  © 5417-9906 [employer]. [address] [place] [address], [place] [address]. All rights reserved. This information is not intended as a substitute for professional medical care. Always follow your healthcare professional's instructions.      Metabolic Syndrome: Losing Excess Weight  Metabolic syndrome is a set of 5 health factors that can lead to serious health problems. The factors greatly increase your risk for diabetes, heart attack, or stroke. Extra weight with a large waist is one of the factors for metabolic syndrome. Being overweight or obese means that you weigh too much for what is healthy for your height. A large waist size is 40 inches or more for men, and 35 inches or more for women.  But you can take steps to lose weight and lower your risk for serious health problems.  Benefits of weight loss  Even with a small weight loss, you may have more energy and feel better. Losing even a small amount of weight can affect your blood pressure, triglycerides, HDL cholesterol, and blood sugar. You may be able to take less medicine for blood pressure, cholesterol, or blood sugar. Or you may be able to stop taking medicine. As you lose weight, your risk for diabetes, heart attack, and stroke will get lower.  Getting started  The best way to lose weight is to do it gradually. For example, lose 1/2 to 1 pound a week. You will need to be more active and eat healthier foods. Make sure to:  · Exercise every  day. Talk with your health care provider to make sure it is safe for you to exercise. Make sure you start slowly. Begin with 10 to 15 minutes of activity. Try to exercise or be active for at least 30 minutes most days of the week. You can exercise all at once or break it up into 10- or 15-minute sessions. And think about other ways you can be more active throughout the day.  · Eat healthy foods. Most successful dieters make changes in what, when, and how much they eat. The best way to lose weight is to eat fewer calories. You should make sure you check your portion sizes, eat breakfast, plan your meals and snacks, and eat slowly.  Working with your health care provider  Talk with your health care provider. He or she can guide you through the process of losing weight. As you begin to make changes, your health care provider will:  · Check your weight loss progress  · Check your blood pressure and blood test results  · Talk with you about your results  · Make suggestions about diet and exercise  · Recommend other experts or programs  · Make changes to your medicines and help with any side effects  Getting additional support  It can be hard to make healthy lifestyle changes. It may take some time to create new habits.  Your health care provider may suggest other experts or programs to help you, such as:  · Health . A health  gives ongoing support and makes suggestions to help you with healthy lifestyle changes, like weight loss.  · Weight loss programs. There are many safe weight loss programs. Some are free or low-cost.  · Dietician. He or she can help you make changes to your diet.  · Exercise specialist. He or she can help you with an exercise plan.  · Counselor. A counselor can help you deal with your feelings and emotions. There are psychiatrists, psychologists, and social workers who specialize in weight problems.  · Bariatric or obesity specialist.  These health care providers are experts in obesity.  They can help with diet, exercise, behavioral therapy or counseling, medicines for weight loss, and very low-calorie diets.  · Bariatric surgeon. Weight loss surgery may be a choice. But it is only advised for people who are over a certain weight, who have health problems because of their weight, and who have not been able to lose weight with other treatments.  Keeping the weight off  After losing weight, keeping it off can be even harder. Dont give up. Make sure to:  · Keep exercising. That means at least 40 to 60 minutes most days of the week.  · Keep eating healthy foods. Continue eating foods that are healthy and avoiding those that arent.  · Stay motivated. Watch your health improve. If you eat something unhealthy or skip exercising, dont give up. Simply make your next choice a healthy one.  © 1051-2667 [employer]. [address] [place] [address]. All rights reserved. This information is not intended as a substitute for professional medical care. Always follow your healthcare professional's instructions.                 These Medications        Disp Refills Start End    levoFLOXacin (LEVAQUIN) 500 MG tablet 14 tablet 0 5/15/2017 5/29/2017    Take 1 tablet (500 mg total) by mouth once daily. - Oral    Pharmacy: [facility] - [address] #: [phone]         [facility]     [facility] Nurse Care Line - 24/7 Assistance  Unless otherwise directed by your provider, please contact [facility], our nurse care line that is available for 24/7 assistance.     Registered nurses in the [facility] provide: appointment scheduling, clinical advisement, health education, and other advisory services.  Call: [phone] (toll free)               Medications           Message regarding Medications     Verify the changes and/or additions to your medication regime listed below are the same as discussed with your clinician today.  If any of  "these changes or additions are incorrect, please notify your healthcare provider.        START taking these NEW medications        Refills    levoFLOXacin (LEVAQUIN) 500 MG tablet 0    Sig: Take 1 tablet (500 mg total) by mouth once daily.    Class: Normal    Route: Oral      These medications were administered today        Dose Freq    dexamethasone injection 8 mg 8 mg Once    Sig: Inject 2 mLs (8 mg total) into the muscle once.    Class: Normal    Route: Intramuscular           Verify that the below list of medications is an accurate representation of the medications you are currently taking.  If none reported, the list may be blank. If incorrect, please contact your healthcare provider. Carry this list with you in case of emergency.           Current Medications     ACIPHEX 20 mg tablet TAKE 1 TABLET BY MOUTH TWO TIMES A DAY    albuterol 90 mcg/actuation inhaler Inhale 2 puffs into the lungs every 6 (six) hours.    BD INSULIN PEN NEEDLE UF MINI 31 x 3/16 " Ndle USE AS DIRECTED    blood sugar diagnostic (BLOOD GLUCOSE TEST) Strp 1 strip by Misc.(Non-Drug; Combo Route) route 3 (three) times daily. accu-chek lux plus    budesonide-formoterol 160-4.5 mcg (SYMBICORT) 160-4.5 mcg/actuation HFAA Inhale 2 puffs into the lungs 2 (two) times daily.    cycloSPORINE (RESTASIS) 0.05 % ophthalmic emulsion Place 0.4 mLs (1 drop total) into both eyes 2 (two) times daily.    GLUCOSAMINE HCL/CHONDR ROSARIO A NA (OSTEO BI-FLEX ORAL) Take 1 tablet by mouth once daily.    HUMALOG KWIKPEN 100 unit/mL InPn pen INJECT 3-4 UNITS INTO THE SKIN THREE TIMES DAILY BEFORE MEALS.    insulin glargine (LANTUS SOLOSTAR) 100 unit/mL (3 mL) InPn pen Inject 40 Units into the skin once daily.    lancets (ACCU-CHEK FASTCLIX) Misc Test 2-3 times daily    levocetirizine (XYZAL) 5 MG tablet Take 1 tablet (5 mg total) by mouth every evening.    meloxicam (MOBIC) 15 MG tablet     metoprolol succinate (TOPROL-XL) 50 MG 24 hr tablet TAKE ONE BY MOUTH TWO TIMES " "A DAY.    mometasone (NASONEX) 50 mcg/actuation nasal spray 2 sprays by Nasal route once daily.    MV,CA,IRON,MIN/FA/PHYTOSTEROL (CENTRUM SPECIALIST HEART ORAL) Take 1 tablet by mouth 2 (two) times daily.    niacin, inositol niacinate, (NIACIN FLUSH FREE) 400 mg niacin (500 mg) Cap Take 500 mg by mouth every evening.    OMEGA-3 FATTY ACIDS/DHA/EPA (MEGARED PLANT-OMEGA-3 ORAL) Take 1 capsule by mouth once daily.    psyllium (METAMUCIL) 0.52 gram capsule Take by mouth.    RESTASIS 0.05 % ophthalmic emulsion PLACE 1 DROP IN BOTH EYES TWO TIMES A DAY    saw palmetto 80 MG capsule Take 80 mg by mouth 2 (two) times daily.    SYNTHROID 137 mcg Tab tablet Take 2 tablets (274 mcg total) by mouth before breakfast.    TWYNSTA 80-10 mg Tab TAKE ONE BY MOUTH EVERY DAY.    levoFLOXacin (LEVAQUIN) 500 MG tablet Take 1 tablet (500 mg total) by mouth once daily.           Clinical Reference Information           Your Vitals Were     BP Pulse Temp Resp Height Weight    147/80 64 98 °F (36.7 °C) (Tympanic) 18 5' 8" (1.727 m) 114.2 kg (251 lb 12.3 oz)    BMI                38.28 kg/m2          Blood Pressure          Most Recent Value    BP  (!)  147/80      Allergies as of 5/15/2017     Iodinated Contrast Media - Oral And Iv Dye    Omeprazole    Prilosec [Omeprazole Magnesium]      Immunizations Administered on Date of Encounter - 5/15/2017     None      [facility] Sign-Up     Activating your [facility] account is as easy as 1-2-3!     1) Visit [email or web address], select Sign Up Now, enter this activation code and your date of birth, then select Next.  Activation code not generated  Current Patient Portal Status: Account disabled      2) Create a username and password to use when you visit [facility] in the future and select a security question in case you lose your password and select Next.    3) Enter your e-mail address and click Sign Up!    Additional Information  If you have questions, please e-mail [email or web address] or call " [phone] to talk to our [facility] staff. Remember, [facility] is NOT to be used for urgent needs. For medical emergencies, dial 911.         Instructions    Cepacol       Language Assistance Services     ATTENTION: Language assistance services are available, free of charge. Please call [phone].      ATENCIÓN: Si habla español, tiene a leach disposición servicios gratuitos de asistencia lingüística. Llame al [phone].     CHÚ Ý: N?u b?n nói Ti?ng Vi?t, có các d?ch v? h? tr? ngôn ng? mi?n phí dành cho b?n. G?i s? [phone].         [facility] complies with applicable Federal civil rights laws and does not discriminate on the basis of race, color, national origin, age, disability, or sex.

## 2017-05-26 ENCOUNTER — TELEPHONE (OUTPATIENT)
Dept: INTERNAL MEDICINE | Facility: CLINIC | Age: 72
End: 2017-05-26

## 2017-05-29 ENCOUNTER — TELEPHONE (OUTPATIENT)
Dept: INTERNAL MEDICINE | Facility: CLINIC | Age: 72
End: 2017-05-29

## 2017-05-29 NOTE — TELEPHONE ENCOUNTER
----- Message from Abilio Gamboa sent at 5/26/2017  3:31 PM CDT -----  Pt is returning a calk to nurse in regards to an apt.              Please call pt back at [phone]

## 2017-05-29 NOTE — TELEPHONE ENCOUNTER
----- Message from Melani Shabazz sent at 5/29/2017  9:45 AM CDT -----  Contact: pt  Rachel - pt is returning your missed call - please call again at [phone]

## 2017-05-31 ENCOUNTER — LAB VISIT (OUTPATIENT)
Dept: LAB | Facility: HOSPITAL | Age: 72
End: 2017-05-31
Attending: FAMILY MEDICINE
Payer: MEDICARE

## 2017-05-31 DIAGNOSIS — E11.9 TYPE 2 DIABETES MELLITUS WITHOUT COMPLICATION, UNSPECIFIED LONG TERM INSULIN USE STATUS: ICD-10-CM

## 2017-05-31 PROCEDURE — 36415 COLL VENOUS BLD VENIPUNCTURE: CPT | Mod: PO

## 2017-05-31 PROCEDURE — 83036 HEMOGLOBIN GLYCOSYLATED A1C: CPT

## 2017-06-01 LAB
ESTIMATED AVG GLUCOSE: 131 MG/DL
HBA1C MFR BLD HPLC: 6.2 %

## 2017-06-08 ENCOUNTER — OFFICE VISIT (OUTPATIENT)
Dept: INTERNAL MEDICINE | Facility: CLINIC | Age: 72
End: 2017-06-08
Payer: MEDICARE

## 2017-06-08 VITALS
TEMPERATURE: 98 F | BODY MASS INDEX: 38.69 KG/M2 | HEART RATE: 71 BPM | WEIGHT: 255.31 LBS | HEIGHT: 68 IN | DIASTOLIC BLOOD PRESSURE: 70 MMHG | SYSTOLIC BLOOD PRESSURE: 122 MMHG | OXYGEN SATURATION: 96 %

## 2017-06-08 DIAGNOSIS — J45.909 UNCOMPLICATED ASTHMA, UNSPECIFIED ASTHMA SEVERITY: ICD-10-CM

## 2017-06-08 DIAGNOSIS — Z79.4 TYPE 2 DIABETES MELLITUS WITHOUT COMPLICATION, WITH LONG-TERM CURRENT USE OF INSULIN: Primary | ICD-10-CM

## 2017-06-08 DIAGNOSIS — I25.10 CORONARY ARTERY DISEASE INVOLVING NATIVE CORONARY ARTERY OF NATIVE HEART WITHOUT ANGINA PECTORIS: ICD-10-CM

## 2017-06-08 DIAGNOSIS — E11.9 TYPE 2 DIABETES MELLITUS WITHOUT COMPLICATION, WITH LONG-TERM CURRENT USE OF INSULIN: Primary | ICD-10-CM

## 2017-06-08 DIAGNOSIS — E03.9 ACQUIRED HYPOTHYROIDISM: ICD-10-CM

## 2017-06-08 DIAGNOSIS — K74.60 CIRRHOSIS OF LIVER WITHOUT ASCITES, UNSPECIFIED HEPATIC CIRRHOSIS TYPE: ICD-10-CM

## 2017-06-08 DIAGNOSIS — I10 ESSENTIAL HYPERTENSION: ICD-10-CM

## 2017-06-08 PROCEDURE — 99999 PR PBB SHADOW E&M-EST. PATIENT-LVL III: CPT | Mod: PBBFAC,,, | Performed by: FAMILY MEDICINE

## 2017-06-08 PROCEDURE — 4010F ACE/ARB THERAPY RXD/TAKEN: CPT | Mod: S$GLB,,, | Performed by: FAMILY MEDICINE

## 2017-06-08 PROCEDURE — 3044F HG A1C LEVEL LT 7.0%: CPT | Mod: S$GLB,,, | Performed by: FAMILY MEDICINE

## 2017-06-08 PROCEDURE — 99214 OFFICE O/P EST MOD 30 MIN: CPT | Mod: S$GLB,,, | Performed by: FAMILY MEDICINE

## 2017-06-08 PROCEDURE — 1159F MED LIST DOCD IN RCRD: CPT | Mod: S$GLB,,, | Performed by: FAMILY MEDICINE

## 2017-06-08 RX ORDER — SILDENAFIL CITRATE 20 MG/1
TABLET ORAL
Qty: 90 TABLET | Refills: 11 | Status: SHIPPED | OUTPATIENT
Start: 2017-06-08 | End: 2018-10-11

## 2017-06-08 RX ORDER — ASPIRIN 81 MG/1
81 TABLET ORAL DAILY
COMMUNITY
End: 2018-07-03 | Stop reason: ALTCHOICE

## 2017-06-08 NOTE — PROGRESS NOTES
Subjective:       Patient ID: Erendira Pretty is a male.    Chief Complaint: Multiple issues see below    HPI type 2 dm controlled;  hep c cure s/p txharvoni ;cirrhosis    htn at [facility]  hypoth: tsh nl [place]  Coronary artery disease:pvd: notup to date with cardiology. No chest pain, palpitations or shortness of breath. Tolerating medication. D/wd statin   Asthma: noc/o;prev flare resolved s/p utd pulm and ent    ED:levitra too much cost. D/wd viagra        Past Medical History   Diagnosis Date    Type 2 diabetes mellitus     Hepatitis C          Hypertension     Hyperlipidemia     Hypothyroid      s/p tx graves    Sleep apnea     Hearing loss NEC     Osteoarthritis     CAD (coronary artery disease)      40% lesion 2002;violet    PVD (peripheral vascular disease)     Claudication 4/9/2014    Ex-smoker     ED (erectile dysfunction)     BPH (benign prostatic hyperplasia)      Past Surgical History   Procedure Laterality Date    Knee surgery      Trigger finger release      Carpal tunnel release      Elbow bursa surgery  2010    Rectal surgery  2012    Eye surgery      Cataract extraction w/ intraocular lens  implant, bilateral  2008    Cataract extraction      Cardiac catheterization       Family History   Problem Relation Age of Onset    Heart disease Mother     Heart disease Father     Heart disease Brother        Review of Systems  Cardiovascular: no chest pain  Chest: no shortness of breath  Abd: no abd pain  Remainder review of systems negative    Objective:    cvrrr  Chest ctablat  Abd+bs softntnd no hsm no mass   Bilateral feet with normal monofilament testing and no lesions.    Assessment:     type 2 dm    htn  cirrhosis  Cad  bph  Asthma  ED  Plan:    Lab and follow up after in 6 months  . F/u gi as sched  . f/u Dr Gallardo when due;has car and aort u/s     He will d/w statin as alt. To niacin  w card    Type 2 diabetes mellitus without complication, with long-term current use  of insulin  -     Lipid panel; Future; Expected date: 12/05/2017  -     Comprehensive metabolic panel; Future; Expected date: 12/05/2017  -     Microalbumin/creatinine urine ratio; Future; Expected date: 12/05/2017  -     TSH; Future; Expected date: 12/05/2017  -     Hemoglobin A1c; Future; Expected date: 12/05/2017    Coronary artery disease involving native coronary artery of native heart without angina pectoris    Essential hypertension    Acquired hypothyroidism    Cirrhosis of liver without ascites, unspecified hepatic cirrhosis type    Uncomplicated asthma, unspecified asthma severity    Other orders  -     sildenafil (REVATIO) 20 mg Tab; Take 2 tablets one hour prior to intercourse. Max two per 24 hours  Dispense: 90 tablet; Refill: 11    at f/udetermine need rck echo pulm regurg

## 2017-06-12 ENCOUNTER — TELEPHONE (OUTPATIENT)
Dept: INTERNAL MEDICINE | Facility: CLINIC | Age: 72
End: 2017-06-12

## 2017-06-12 RX ORDER — PEN NEEDLE, DIABETIC 30 GX3/16"
NEEDLE, DISPOSABLE MISCELLANEOUS
Qty: 100 EACH | Refills: 11 | Status: SHIPPED | OUTPATIENT
Start: 2017-06-12 | End: 2020-07-11

## 2017-06-12 NOTE — TELEPHONE ENCOUNTER
----- Message from Becki Nixon sent at 6/12/2017  3:27 PM CDT -----  Contact: pt   States he's calling regarding his prescription being faxed in today. States they've been trying a week to get this prescription. Please call pt at [phone]. Thank you

## 2017-06-13 ENCOUNTER — TELEPHONE (OUTPATIENT)
Dept: INTERNAL MEDICINE | Facility: CLINIC | Age: 72
End: 2017-06-13

## 2017-06-13 NOTE — TELEPHONE ENCOUNTER
----- Message from Melani Shabazz sent at 2017  2:20 PM CDT -----  Contact: [facility]  with [facility]  [phone]  Need the A1C reading for May 31 - please leave msg if you get her secure voicemail with the pt's name and

## 2017-06-19 ENCOUNTER — TELEPHONE (OUTPATIENT)
Dept: CARDIOLOGY | Facility: CLINIC | Age: 72
End: 2017-06-19

## 2017-06-19 DIAGNOSIS — I25.10 CORONARY ARTERY DISEASE, ANGINA PRESENCE UNSPECIFIED, UNSPECIFIED VESSEL OR LESION TYPE, UNSPECIFIED WHETHER NATIVE OR TRANSPLANTED HEART: Primary | ICD-10-CM

## 2017-06-19 DIAGNOSIS — I10 ESSENTIAL HYPERTENSION: ICD-10-CM

## 2017-06-19 NOTE — TELEPHONE ENCOUNTER
----- Message from Promise Lyons sent at 6/19/2017 10:47 AM CDT -----  Contact: ashtyn   Call caller regarding needing to know if pt need blood work done for his appt tomorrow.   ...[phone]

## 2017-06-26 ENCOUNTER — HOSPITAL ENCOUNTER (OUTPATIENT)
Facility: HOSPITAL | Age: 72
Discharge: HOME | End: 2017-06-26
Attending: INTERNAL MEDICINE | Admitting: INTERNAL MEDICINE
Payer: MEDICARE

## 2017-06-26 ENCOUNTER — ANESTHESIA EVENT (OUTPATIENT)
Dept: ENDOSCOPY | Facility: HOSPITAL | Age: 72
End: 2017-06-26
Payer: MEDICARE

## 2017-06-26 ENCOUNTER — ANESTHESIA (OUTPATIENT)
Dept: ENDOSCOPY | Facility: HOSPITAL | Age: 72
End: 2017-06-26
Payer: MEDICARE

## 2017-06-26 ENCOUNTER — SURGERY (OUTPATIENT)
Age: 72
End: 2017-06-26

## 2017-06-26 DIAGNOSIS — I85.10 SECONDARY ESOPHAGEAL VARICES WITHOUT BLEEDING: ICD-10-CM

## 2017-06-26 DIAGNOSIS — I85.00 ESOPHAGEAL VARICES: Primary | ICD-10-CM

## 2017-06-26 LAB — POCT GLUCOSE: 107 MG/DL (ref 70–110)

## 2017-06-26 PROCEDURE — 25000003 PHARM REV CODE 250: Performed by: NURSE ANESTHETIST, CERTIFIED REGISTERED

## 2017-06-26 PROCEDURE — 37000009 HC ANESTHESIA EA ADD 15 MINS: Performed by: INTERNAL MEDICINE

## 2017-06-26 PROCEDURE — 25000003 PHARM REV CODE 250: Performed by: INTERNAL MEDICINE

## 2017-06-26 PROCEDURE — 43235 EGD DIAGNOSTIC BRUSH WASH: CPT | Performed by: INTERNAL MEDICINE

## 2017-06-26 PROCEDURE — 43235 EGD DIAGNOSTIC BRUSH WASH: CPT | Mod: ,,, | Performed by: INTERNAL MEDICINE

## 2017-06-26 PROCEDURE — 37000008 HC ANESTHESIA 1ST 15 MINUTES: Performed by: INTERNAL MEDICINE

## 2017-06-26 PROCEDURE — 63600175 PHARM REV CODE 636 W HCPCS: Performed by: NURSE ANESTHETIST, CERTIFIED REGISTERED

## 2017-06-26 PROCEDURE — 82962 GLUCOSE BLOOD TEST: CPT | Performed by: INTERNAL MEDICINE

## 2017-06-26 RX ORDER — SODIUM CHLORIDE, SODIUM LACTATE, POTASSIUM CHLORIDE, CALCIUM CHLORIDE 600; 310; 30; 20 MG/100ML; MG/100ML; MG/100ML; MG/100ML
INJECTION, SOLUTION INTRAVENOUS CONTINUOUS
Status: DISCONTINUED | OUTPATIENT
Start: 2017-06-26 | End: 2017-06-26 | Stop reason: HOSPADM

## 2017-06-26 RX ORDER — PROPOFOL 10 MG/ML
VIAL (ML) INTRAVENOUS
Status: DISCONTINUED | OUTPATIENT
Start: 2017-06-26 | End: 2017-06-26

## 2017-06-26 RX ORDER — LIDOCAINE HYDROCHLORIDE 10 MG/ML
INJECTION INFILTRATION; PERINEURAL
Status: DISCONTINUED | OUTPATIENT
Start: 2017-06-26 | End: 2017-06-26

## 2017-06-26 RX ADMIN — PROPOFOL 50 MG: 10 INJECTION, EMULSION INTRAVENOUS at 07:06

## 2017-06-26 RX ADMIN — SODIUM CHLORIDE, SODIUM LACTATE, POTASSIUM CHLORIDE, AND CALCIUM CHLORIDE: 600; 310; 30; 20 INJECTION, SOLUTION INTRAVENOUS at 07:06

## 2017-06-26 RX ADMIN — LIDOCAINE HYDROCHLORIDE 50 MG: 10 INJECTION, SOLUTION INFILTRATION; PERINEURAL at 07:06

## 2017-06-26 NOTE — PLAN OF CARE
Dr Hinojosa to bedside and discussed findings. NO N/V,  no abdominal pain, no GI bleeding, and vitals stable.  Pt discharged from unit.

## 2017-06-26 NOTE — TRANSFER OF CARE
"Anesthesia Transfer of Care Note    Patient: Erendira Pretty    Procedure(s) Performed: Procedure(s) (LRB):  ESOPHAGOGASTRODUODENOSCOPY (EGD) (N/A)    Patient location: GI    Anesthesia Type: MAC    Transport from OR: Transported from OR on room air with adequate spontaneous ventilation    Post pain: adequate analgesia    Post assessment: no apparent anesthetic complications    Post vital signs: stable    Level of consciousness: awake, alert and oriented    Nausea/Vomiting: no nausea/vomiting    Complications: none    Transfer of care protocol was followed      Last vitals:   Visit Vitals  BP (!) 142/88 (BP Location: Left arm, Patient Position: Lying, BP Method: Automatic)   Pulse 60   Temp 36.8 °C (98.2 °F) (Oral)   Resp 18   Ht 5' 8" (1.727 m)   Wt 113.4 kg (250 lb)   SpO2 97%   BMI 38.01 kg/m²     "

## 2017-06-26 NOTE — ANESTHESIA RELEASE NOTE
"Anesthesia Release from PACU Note    Patient: Erendira Pretty    Procedure(s) Performed: Procedure(s) (LRB):  ESOPHAGOGASTRODUODENOSCOPY (EGD) (N/A)    Anesthesia type: MAC    Post pain: Adequate analgesia    Post assessment: no apparent anesthetic complications, tolerated procedure well and no evidence of recall    Last Vitals:   Visit Vitals  /71   Pulse (!) 56   Temp 36.8 °C (98.2 °F) (Oral)   Resp 18   Ht 5' 8" (1.727 m)   Wt 113.4 kg (250 lb)   SpO2 98%   BMI 38.01 kg/m²       Post vital signs: stable    Level of consciousness: awake, alert  and oriented    Nausea/Vomiting: no nausea/no vomiting    Complications: none    Airway Patency: patent    Respiratory: unassisted, spontaneous ventilation, room air    Cardiovascular: stable and blood pressure at baseline    Hydration: euvolemic  "

## 2017-06-26 NOTE — ANESTHESIA POSTPROCEDURE EVALUATION
"Anesthesia Post Evaluation    Patient: Erendira Pretty    Procedure(s) Performed: Procedure(s) (LRB):  ESOPHAGOGASTRODUODENOSCOPY (EGD) (N/A)    Final Anesthesia Type: MAC  Patient location during evaluation: GI PACU  Patient participation: Yes- Able to Participate  Level of consciousness: awake and alert and oriented  Post-procedure vital signs: reviewed and stable  Pain management: adequate  Airway patency: patent  PONV status at discharge: No PONV  Anesthetic complications: no      Cardiovascular status: blood pressure returned to baseline  Respiratory status: unassisted, room air and spontaneous ventilation  Hydration status: euvolemic  Follow-up not needed.        Visit Vitals  /71   Pulse (!) 56   Temp 36.8 °C (98.2 °F) (Oral)   Resp 18   Ht 5' 8" (1.727 m)   Wt 113.4 kg (250 lb)   SpO2 98%   BMI 38.01 kg/m²       Pain/Natalie Score: Pain Assessment Performed: Yes (6/26/2017  8:31 AM)  Presence of Pain: denies (6/26/2017  8:31 AM)  Natalie Score: 10 (6/26/2017  8:31 AM)      "

## 2017-06-26 NOTE — ANESTHESIA PREPROCEDURE EVALUATION
06/26/2017  Erendira Pretty is a 72 y.o., male.    Anesthesia Evaluation    I have reviewed the Patient Summary Reports.    I have reviewed the Nursing Notes.   I have reviewed the Medications.     Review of Systems  Anesthesia Hx:  No problems with previous Anesthesia    Social:  Former Smoker    Cardiovascular:   Hypertension CAD   ECG has been reviewed. CONCLUSIONS     1 - Concentric remodeling.     2 - Normal left ventricular systolic function (EF 60-65%).     3 - Normal right ventricular systolic function .     4 - Left ventricular diastolic dysfunction. Grade II    5 - Mild to moderate pulmonic regurgitation.  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  Obstructive Sleep Apnea (DELONTE).   Hepatic/GI:   Liver Disease, Hepatitis, C  Liver Disease, Hepatitis    Musculoskeletal:   Arthritis     Endocrine:   Diabetes, type 2, using insulin Hypothyroidism  Diabetes , controlled by insulin.  Metabolic Disorders, Obesity / BMI > 30      Physical Exam  General:  Obesity    Airway/Jaw/Neck:  Airway Findings: Mouth Opening: Normal Tongue: Normal  General Airway Assessment: Adult       Chest/Lungs:  Chest/Lungs Findings: Normal Respiratory Rate     Heart/Vascular:  Heart Findings: Rate: Normal             Anesthesia Plan  Type of Anesthesia, risks & benefits discussed:  Anesthesia Type:  general  Patient's Preference:   Intra-op Monitoring Plan:   Intra-op Monitoring Plan Comments:   Post Op Pain Control Plan:   Post Op Pain Control Plan Comments:   Induction:   IV  Beta Blocker:  Patient is on a Beta-Blocker and has received one dose within the past 24 hours (No further documentation required).       Informed Consent: Patient understands risks  and agrees with Anesthesia plan.  Questions answered. Anesthesia consent signed with patient.  ASA Score: 3     Day of Surgery Review of History & Physical: I have interviewed and examined the patient. I have reviewed the patient's H&P dated:  There are no significant changes.          Ready For Surgery From Anesthesia Perspective.

## 2017-06-26 NOTE — H&P
Short Stay Endoscopy History and Physical    PCP - Cortes Roblero MD    Procedure - EGD    Cirrhosis w/ h/o esophageal varices. No acute issues.    ROS:  Constitutional: No fevers, chills, No weight loss  ENT: No allergies  CV: No chest pain  Pulm: No cough, No shortness of breath  Ophtho: No vision changes  GI: see HPI  Derm: No rash  Heme: No lymphadenopathy, No bruising  MSK: No arthritis  : No dysuria, No hematuria  Endo: No hot or cold intolerance  Neuro: No syncope, No seizure  Psych: No anxiety, No depression    Medical History:  has a past medical history of Asthma; BPH (benign prostatic hyperplasia); CAD (coronary artery disease); Cirrhosis; Claudication (4/9/2014); Colon polyp; ED (erectile dysfunction); Ex-smoker; Hearing loss NEC; Hepatitis C; Hyperlipidemia; Hypertension; Hypothyroid; Osteoarthritis; PVD (peripheral vascular disease); Sleep apnea; and Type 2 diabetes mellitus.    Surgical History:  has a past surgical history that includes Knee surgery; Trigger finger release; Carpal tunnel release; Elbow bursa surgery (2010); Rectal surgery (2012); Eye surgery; Cataract extraction w/ intraocular lens  implant, bilateral (2008); Cataract extraction; Cardiac catheterization; Colonoscopy (8/2013); and Colonoscopy (N/A, 5/30/2016).    Family History: family history includes Heart disease in his brother, father, and mother.. Otherwise no colon cancer, inflammatory bowel disease, or GI malignancies.    Social History:  reports that he quit smoking about 45 years ago. He has a 2.00 pack-year smoking history. He quit smokeless tobacco use about 40 years ago. His smokeless tobacco use included Chew. He reports that he does not drink alcohol or use drugs.    Review of patient's allergies indicates:   Allergen Reactions    Iodinated contrast media - oral and iv dye     Omeprazole     Prilosec [omeprazole magnesium]        Medications:   No prescriptions prior to admission.       Objective  Findings:    Vital Signs:   Vitals:    06/26/17 0831   BP: 129/71   Pulse:    Resp:    Temp:          Physical Exam:  General Appearance: Well appearing in no acute distress  Eyes:    No scleral icterus  ENT: Neck supple, Lips, mucosa, and tongue normal; teeth and gums normal  Lungs: CTA bilaterally in anterior and posterior fields, no wheezes, no crackles.  Heart:  Regular rate, S1, S2 normal, no murmurs heard.  Abdomen: Soft, non tender, non distended with normal bowel sounds. No hepatosplenomegaly, ascites, or mass.  Extremities: No clubbing, cyanosis or edema  Skin: No rash    Labs:  Lab Results   Component Value Date    WBC 5.72 04/28/2017    HGB 14.6 04/28/2017    HCT 40.6 04/28/2017     (L) 04/28/2017    CHOL 175 03/23/2016    TRIG 136 03/23/2016    HDL 39 (L) 03/23/2016    ALT 29 04/28/2017    AST 25 04/28/2017     04/28/2017    K 4.2 04/28/2017     04/28/2017    CREATININE 1.0 04/28/2017    BUN 21 04/28/2017    CO2 26 04/28/2017    TSH 1.234 03/15/2017    PSA 0.40 03/23/2016    INR 1.1 04/28/2017    GLUF 130 (H) 05/20/2009    HGBA1C 6.2 05/31/2017       I have explained the risks and benefits of endoscopy procedures to the patient including but not limited to bleeding, perforation, infection, and death.    Proceed with EGD for variceal surveillance.

## 2017-06-26 NOTE — DISCHARGE INSTRUCTIONS
Gastritis (Adult)    Gastritis is inflammation and irritation of the stomach lining. It can be present for a short time (acute) or be long lasting (chronic). Gastritis is often caused by infection with bacteria called H pylori. More than a third of people in the [place] have this bacteria in their bodies. In many cases, H pylori causes no problems or symptoms. In some people, though, the infection irritates the stomach lining and causes gastritis. Other causes of stomach irritation include drinking alcohol or taking pain-relieving medicines called NSAIDs (such as aspirin or ibuprofen).   Symptoms of gastritis can include:  · Abdominal pain or bloating  · Loss of appetite  · Nausea or vomiting  · Vomiting blood or having black stools  · Feeling more tired than usual  An inflamed and irritated stomach lining is more likely to develop a sore called an ulcer. To help prevent this, gastritis should be treated.  Home care  If needed, medicines may be prescribed. If you have H pylori infection, treating it will likely relieve your symptoms. Other changes can help reduce stomach irritation and help it heal.  · If you have been prescribed medicines for H pylori infection, take them as directed. Take all of the medicine until it is finished or your healthcare provider tells you to stop, even if you feel better.  · Your healthcare provider may recommend avoiding NSAIDs. If you take daily aspirin for your heart or other medical reasons, do not stop without talking to your healthcare provider first.  · Avoid drinking alcohol.  · Stop smoking. Smoking can irritate the stomach and delay healing. As much as possible, stay away from second hand smoke.  Follow-up care  Follow up with your healthcare provider, or as advised by our staff. Testing may be needed to check for inflammation or an ulcer.  When to seek medical advice  Call your healthcare provider for any of the following:  · Stomach pain that gets worse or moves to the lower  right abdomen (appendix area)  · Chest pain that appears or gets worse, or spreads to the back, neck, shoulder, or arm  · Frequent vomiting (cant keep down liquids)  · Blood in the stool or vomit (red or black in color)  · Feeling weak or dizzy  · Fever of 100.4ºF (38ºC) or higher, or as directed by your healthcare provider  Date Last Reviewed: 6/22/2015 © 2000-2016 [employer]. [address] [place] [address], [place] [address]. All rights reserved. This information is not intended as a substitute for professional medical care. Always follow your healthcare professional's instructions.        Esophageal Varices     With esophageal varices, blood vessels in the esophagus become abnormally enlarged. They may then burst (rupture) and bleed.   Esophageal varices are enlarged veins at the lower end of the esophagus. The esophagus is the tube that carries food from your mouth to your stomach. Varices most often occur because of problems with blood flow in the liver caused by chronic liver disease. Normally, a blood vessel called the portal vein carries blood from the digestive organs to the liver. But with liver disease, blood flow can be blocked due to scarring of the liver. This increases the blood pressure in the portal vein (a condition known as portal hypertension). Blood then backs up in nearby veins in the esophagus and stomach, causing varices. Varices are a serious and deadly problem. Treatment is needed to prevent them from bursting (rupturing) and bleeding. If bleeding occurs, it can be fatal.  Symptoms of esophageal varices  Symptoms do not occur unless the varices are bleeding. This is an emergency problem. If you have any of the following symptoms, get medical attention right away:  · Vomiting blood  · Black, tarry, or bloody stools  · Feeling lightheaded, or fainting (loss of consciousness)  Diagnosing esophageal \varices  Youll likely be checked for varices if you have liver disease or other  health problems that can cause them. Your healthcare provider will ask about your symptoms and health history. Youll also be examined. Tests are then done to confirm the problem. Tests can include:  · Upper endoscopy. This is done to see inside the upper digestive tract. During the test, an endoscope is used. This is a thin, flexible tube with a tiny camera on the end. Its inserted through your mouth. Its then guided down through your esophagus, stomach, and first part of your small intestine. This allows the provider to check for varices and find any bleeding.  · Imaging tests. These provide pictures of the liver or blood flow in the liver. They allow the provider to check for enlarged veins around the liver and assess the risk of bleeding. Common imaging tests done include ultrasound and CT scans.  Treating esophageal varices  The goal of treatment is to reduce the risk of bleeding or to control bleeding. Treatment can include 1 or more of the following:  · Medicines. These may be prescribed to lower the blood pressure inside the enlarged veins. This reduces the risk of bleeding. Beta-blockers are the most common medicine used.  · Endoscopic therapy. These are treatments for enlarged or bleeding veins that are done using an endoscope. With ligation, small rubber bands are placed around the veins to close them off and stop any bleeding. With sclerotherapy, a blood-clotting medicine is injected into the veins to cause scarring and shrink them.  · Balloon tamponade. A tube with a balloon is guided down into your esophagus and stomach. The balloon is then filled with air. This puts pressure on enlarged or bleeding veins to control bleeding. This is a short-term (temporary) way to control bleeding until other treatments are available.   · Surgery. This may be done to place a tubelike device (stent) in the liver. The stent helps redirect blood flow in the liver to lower the blood pressure in enlarged veins.  Sometimes, the enlarged veins may be connected to other nearby veins to redirect blood flow. In severe cases, a liver transplant may be needed. For this surgery, a diseased liver is replaced with a healthy liver from another person.   Follow-up  Regular visits with your provider are needed to check for bleeding of the varices. If bleeding occurs, it is likely to occur again. More treatments will then be needed in the future. Once endoscopic therapy (banding) is performed, regular follow-up endoscopic scans with banding are done to completely get rid of the varices. If you are given medicines to take by mouth, be sure to take them as directed. Work closely with your provider to manage your condition. Know when to seek emergency care.  Date Last Reviewed: 7/1/2016  © 2412-0881 [employer]. [address] [place] [address], [place] [address]. All rights reserved. This information is not intended as a substitute for professional medical care. Always follow your healthcare professional's instructions.

## 2017-06-27 VITALS
TEMPERATURE: 98 F | SYSTOLIC BLOOD PRESSURE: 129 MMHG | HEIGHT: 68 IN | RESPIRATION RATE: 18 BRPM | WEIGHT: 250 LBS | DIASTOLIC BLOOD PRESSURE: 71 MMHG | HEART RATE: 56 BPM | BODY MASS INDEX: 37.89 KG/M2 | OXYGEN SATURATION: 98 %

## 2017-06-30 ENCOUNTER — TELEPHONE (OUTPATIENT)
Dept: CARDIOLOGY | Facility: CLINIC | Age: 72
End: 2017-06-30

## 2017-06-30 NOTE — TELEPHONE ENCOUNTER
----- Message from Elvira Smith sent at 6/30/2017 10:30 AM CDT -----  Contact: Pt  Pt wife is going to having cancer treatment the week of the pt scheduled appt and he wants to know if he can be fitted in some time next week because he will be in [place]. Please give pt a call at .[phone] .[phone] (home)

## 2017-06-30 NOTE — TELEPHONE ENCOUNTER
Called back to patient to offer appointment with Dr. Gallardo on 7/6/17 in [place] at 2:00 P.M..--left voice message to call our office back at [phone].

## 2017-07-03 ENCOUNTER — LAB VISIT (OUTPATIENT)
Dept: LAB | Facility: HOSPITAL | Age: 72
End: 2017-07-03
Attending: INTERNAL MEDICINE
Payer: MEDICARE

## 2017-07-03 DIAGNOSIS — I10 ESSENTIAL HYPERTENSION: ICD-10-CM

## 2017-07-03 DIAGNOSIS — I25.10 CORONARY ARTERY DISEASE, ANGINA PRESENCE UNSPECIFIED, UNSPECIFIED VESSEL OR LESION TYPE, UNSPECIFIED WHETHER NATIVE OR TRANSPLANTED HEART: ICD-10-CM

## 2017-07-03 LAB
ALBUMIN SERPL BCP-MCNC: 3.5 G/DL
ALP SERPL-CCNC: 69 U/L
ALT SERPL W/O P-5'-P-CCNC: 28 U/L
AST SERPL-CCNC: 26 U/L
BILIRUB DIRECT SERPL-MCNC: 0.2 MG/DL
BILIRUB SERPL-MCNC: 0.5 MG/DL
CHOLEST/HDLC SERPL: 4.1 {RATIO}
HDL/CHOLESTEROL RATIO: 24.2 %
HDLC SERPL-MCNC: 186 MG/DL
HDLC SERPL-MCNC: 45 MG/DL
LDLC SERPL CALC-MCNC: 109 MG/DL
NONHDLC SERPL-MCNC: 141 MG/DL
PROT SERPL-MCNC: 7.7 G/DL
TRIGL SERPL-MCNC: 160 MG/DL

## 2017-07-03 PROCEDURE — 80076 HEPATIC FUNCTION PANEL: CPT

## 2017-07-03 PROCEDURE — 36415 COLL VENOUS BLD VENIPUNCTURE: CPT | Mod: PO

## 2017-07-03 PROCEDURE — 80061 LIPID PANEL: CPT

## 2017-07-05 RX ORDER — INSULIN LISPRO 100 [IU]/ML
INJECTION, SOLUTION INTRAVENOUS; SUBCUTANEOUS
Qty: 3 ML | Refills: 1 | Status: SHIPPED | OUTPATIENT
Start: 2017-07-05 | End: 2017-08-08 | Stop reason: SDUPTHER

## 2017-07-06 ENCOUNTER — CLINICAL SUPPORT (OUTPATIENT)
Dept: CARDIOLOGY | Facility: CLINIC | Age: 72
End: 2017-07-06
Payer: MEDICARE

## 2017-07-06 ENCOUNTER — OFFICE VISIT (OUTPATIENT)
Dept: CARDIOLOGY | Facility: CLINIC | Age: 72
End: 2017-07-06
Payer: MEDICARE

## 2017-07-06 VITALS
HEART RATE: 63 BPM | WEIGHT: 255.94 LBS | BODY MASS INDEX: 38.79 KG/M2 | HEIGHT: 68 IN | DIASTOLIC BLOOD PRESSURE: 74 MMHG | SYSTOLIC BLOOD PRESSURE: 124 MMHG

## 2017-07-06 DIAGNOSIS — I10 ESSENTIAL HYPERTENSION: ICD-10-CM

## 2017-07-06 DIAGNOSIS — I49.3 PVC (PREMATURE VENTRICULAR CONTRACTION): ICD-10-CM

## 2017-07-06 DIAGNOSIS — E66.9 OBESITY, UNSPECIFIED: ICD-10-CM

## 2017-07-06 DIAGNOSIS — I25.10 CORONARY ARTERY DISEASE INVOLVING NATIVE CORONARY ARTERY OF NATIVE HEART WITHOUT ANGINA PECTORIS: ICD-10-CM

## 2017-07-06 DIAGNOSIS — I25.10 CORONARY ATHEROSCLEROSIS OF UNSPECIFIED TYPE OF VESSEL, NATIVE OR GRAFT: ICD-10-CM

## 2017-07-06 DIAGNOSIS — G47.33 OBSTRUCTIVE SLEEP APNEA SYNDROME: ICD-10-CM

## 2017-07-06 DIAGNOSIS — I73.9 PVD (PERIPHERAL VASCULAR DISEASE): ICD-10-CM

## 2017-07-06 DIAGNOSIS — I25.10 CORONARY ATHEROSCLEROSIS OF UNSPECIFIED TYPE OF VESSEL, NATIVE OR GRAFT: Primary | ICD-10-CM

## 2017-07-06 DIAGNOSIS — E11.9 TYPE 2 DIABETES MELLITUS WITHOUT COMPLICATION, WITH LONG-TERM CURRENT USE OF INSULIN: ICD-10-CM

## 2017-07-06 DIAGNOSIS — Z79.4 TYPE 2 DIABETES MELLITUS WITHOUT COMPLICATION, WITH LONG-TERM CURRENT USE OF INSULIN: ICD-10-CM

## 2017-07-06 PROCEDURE — 1159F MED LIST DOCD IN RCRD: CPT | Mod: S$GLB,,, | Performed by: INTERNAL MEDICINE

## 2017-07-06 PROCEDURE — 4010F ACE/ARB THERAPY RXD/TAKEN: CPT | Mod: S$GLB,,, | Performed by: INTERNAL MEDICINE

## 2017-07-06 PROCEDURE — 99214 OFFICE O/P EST MOD 30 MIN: CPT | Mod: S$GLB,,, | Performed by: INTERNAL MEDICINE

## 2017-07-06 PROCEDURE — 99999 PR PBB SHADOW E&M-EST. PATIENT-LVL III: CPT | Mod: PBBFAC,,, | Performed by: INTERNAL MEDICINE

## 2017-07-06 PROCEDURE — 3044F HG A1C LEVEL LT 7.0%: CPT | Mod: S$GLB,,, | Performed by: INTERNAL MEDICINE

## 2017-07-06 PROCEDURE — 93000 ELECTROCARDIOGRAM COMPLETE: CPT | Mod: S$GLB,,, | Performed by: INTERNAL MEDICINE

## 2017-07-06 PROCEDURE — 1126F AMNT PAIN NOTED NONE PRSNT: CPT | Mod: S$GLB,,, | Performed by: INTERNAL MEDICINE

## 2017-07-06 RX ORDER — MONTELUKAST SODIUM 10 MG/1
TABLET ORAL
Refills: 11 | COMMUNITY
Start: 2017-06-07 | End: 2017-09-18

## 2017-07-06 RX ORDER — EZETIMIBE 10 MG/1
10 TABLET ORAL DAILY
Qty: 90 TABLET | Refills: 3 | Status: SHIPPED | OUTPATIENT
Start: 2017-07-06 | End: 2018-07-06 | Stop reason: SDUPTHER

## 2017-07-06 NOTE — PROGRESS NOTES
Subjective:   Patient ID:  Erendira Pretty is a 72 y.o. male who presents for follow-up of Peripheral Vascular Disease; Coronary Artery Disease; and Hypertension  Pt with fatigue since being on niacin. Patient denies CP, angina or anginal equivalent.NMT (-) 2016. EKG today is nml.    Hypertension   This is a chronic problem. The current episode started more than 1 year ago. The problem has been gradually improving since onset. The problem is controlled. Pertinent negatives include no chest pain, palpitations or shortness of breath. Past treatments include beta blockers. The current treatment provides moderate improvement. Compliance problems include exercise.    Hyperlipidemia   This is a chronic problem. The current episode started more than 1 year ago. The problem is controlled. Recent lipid tests were reviewed and are variable. Pertinent negatives include no chest pain or shortness of breath. Current antihyperlipidemic treatment includes nicotinic acid. The current treatment provides mild improvement of lipids. Compliance problems include medication side effects.    Coronary Artery Disease   Presents for follow-up visit. Pertinent negatives include no chest pain, chest pressure, chest tightness, dizziness, leg swelling, muscle weakness, palpitations, shortness of breath or weight gain. Risk factors include hyperlipidemia. The symptoms have been stable. Compliance with diet is variable. Compliance with exercise is variable. Compliance with medications is good.       Review of Systems   Constitution: Negative. Negative for weight gain.   HENT: Negative.    Eyes: Negative.    Cardiovascular: Negative.  Negative for chest pain, leg swelling and palpitations.   Respiratory: Negative.  Negative for chest tightness and shortness of breath.    Endocrine: Negative.    Hematologic/Lymphatic: Negative.    Skin: Negative.    Musculoskeletal: Negative for muscle weakness.   Gastrointestinal: Negative.    Genitourinary:  Negative.    Neurological: Negative.  Negative for dizziness.   Psychiatric/Behavioral: Negative.    Allergic/Immunologic: Negative.      Family History   Problem Relation Age of Onset    Heart disease Mother     Heart disease Father     Heart disease Brother     Colon cancer Neg Hx      Past Medical History:   Diagnosis Date    Asthma     BPH (benign prostatic hyperplasia)     CAD (coronary artery disease)     40% lesion 2002;lazo    Cirrhosis     Claudication 4/9/2014    Colon polyp     ED (erectile dysfunction)     Ex-smoker     Hearing loss NEC     Hepatitis C     Cured;reji brown 2015    Hyperlipidemia     Hypertension     Hypothyroid     s/p tx graves    Osteoarthritis     PVD (peripheral vascular disease)     Sleep apnea     Type 2 diabetes mellitus      Current Outpatient Prescriptions on File Prior to Visit   Medication Sig Dispense Refill    ACIPHEX 20 mg tablet TAKE 1 TABLET BY MOUTH TWO TIMES A DAY 60 tablet 11    albuterol 90 mcg/actuation inhaler Inhale 2 puffs into the lungs every 6 (six) hours. 1 Inhaler 12    aspirin (ECOTRIN) 81 MG EC tablet Take 81 mg by mouth once daily.      blood sugar diagnostic (BLOOD GLUCOSE TEST) Strp 1 strip by Misc.(Non-Drug; Combo Route) route 3 (three) times daily. accu-chek lux plus 100 strip 11    budesonide-formoterol 160-4.5 mcg (SYMBICORT) 160-4.5 mcg/actuation HFAA Inhale 2 puffs into the lungs 2 (two) times daily. 1 Inhaler 11    cycloSPORINE (RESTASIS) 0.05 % ophthalmic emulsion Place 0.4 mLs (1 drop total) into both eyes 2 (two) times daily. 60 each 11    GLUCOSAMINE HCL/CHONDR ROSARIO A NA (OSTEO BI-FLEX ORAL) Take 1 tablet by mouth once daily.      HUMALOG KWIKPEN 100 unit/mL InPn pen INJECT 3-4 UNITS INTO THE SKIN THREE TIMES DAILY BEFORE MEALS. 3 mL 1    insulin glargine (LANTUS SOLOSTAR) 100 unit/mL (3 mL) InPn pen Inject 40 Units into the skin once daily. 1 Box 11    lancets (ACCU-CHEK FASTCLIX) Misc Test 2-3 times  "daily 200 each 3    levocetirizine (XYZAL) 5 MG tablet Take 1 tablet (5 mg total) by mouth every evening. 30 tablet 11    metoprolol succinate (TOPROL-XL) 50 MG 24 hr tablet TAKE ONE BY MOUTH TWO TIMES A DAY. 60 tablet 11    mometasone (NASONEX) 50 mcg/actuation nasal spray 2 sprays by Nasal route once daily. 17 g 12    MV,CA,IRON,MIN/FA/PHYTOSTEROL (CENTRUM SPECIALIST HEART ORAL) Take 1 tablet by mouth 2 (two) times daily.      niacin, inositol niacinate, (NIACIN FLUSH FREE) 400 mg niacin (500 mg) Cap Take 500 mg by mouth every evening.  0    OMEGA-3 FATTY ACIDS/DHA/EPA (MEGARED PLANT-OMEGA-3 ORAL) Take 1 capsule by mouth once daily.      pen needle, diabetic (BD INSULIN PEN NEEDLE UF MINI) 31 gauge x 3/16" Ndle USE AS DIRECTED 100 each 11    psyllium (METAMUCIL) 0.52 gram capsule Take by mouth.      RESTASIS 0.05 % ophthalmic emulsion PLACE 1 DROP IN BOTH EYES TWO TIMES A DAY 60 each 0    saw palmetto 80 MG capsule Take 80 mg by mouth 2 (two) times daily.      sildenafil (REVATIO) 20 mg Tab Take 2 tablets one hour prior to intercourse. Max two per 24 hours 90 tablet 11    SYNTHROID 137 mcg Tab tablet Take 2 tablets (274 mcg total) by mouth before breakfast. 60 tablet 5    TWYNSTA 80-10 mg Tab TAKE ONE BY MOUTH EVERY DAY. 30 tablet 11     Current Facility-Administered Medications on File Prior to Visit   Medication Dose Route Frequency Provider Last Rate Last Dose    dexamethasone injection 8 mg  8 mg Intramuscular Once Alejandro Parkinson MD         Review of patient's allergies indicates:   Allergen Reactions    Iodinated contrast- oral and iv dye     Omeprazole     Prilosec [omeprazole magnesium]        Objective:     Physical Exam   Constitutional: He is oriented to person, place, and time. He appears well-developed and well-nourished.   HENT:   Head: Normocephalic and atraumatic.   Eyes: Conjunctivae are normal. Pupils are equal, round, and reactive to light.   Neck: Normal range of motion. Neck " supple.   Cardiovascular: Normal rate, regular rhythm, normal heart sounds and intact distal pulses.    Pulmonary/Chest: Effort normal and breath sounds normal.   Abdominal: Soft. Bowel sounds are normal.   Neurological: He is alert and oriented to person, place, and time.   Skin: Skin is warm and dry.   Psychiatric: He has a normal mood and affect.   Nursing note and vitals reviewed.      Assessment:     1. Coronary atherosclerosis of unspecified type of vessel, native or graft    2. Type 2 diabetes mellitus without complication, with long-term current use of insulin    3. Essential hypertension    4. Obesity, unspecified    5. Obstructive sleep apnea syndrome    6. PVC (premature ventricular contraction)    7. Coronary artery disease involving native coronary artery of native heart without angina pectoris    8. PVD (peripheral vascular disease)        Plan:     Coronary atherosclerosis of unspecified type of vessel, native or graft  -     SCHEDULED EKG 12-LEAD (to Muse); Future    Type 2 diabetes mellitus without complication, with long-term current use of insulin    Essential hypertension    Obesity, unspecified    Obstructive sleep apnea syndrome    PVC (premature ventricular contraction)    Coronary artery disease involving native coronary artery of native heart without angina pectoris    PVD (peripheral vascular disease)    stop niacin  Start zetia  Continue metoprolol-htn  Continue asa- cad  No statins in the past because of cirrhosis of liver

## 2017-08-10 RX ORDER — INSULIN LISPRO 100 [IU]/ML
INJECTION, SOLUTION INTRAVENOUS; SUBCUTANEOUS
Qty: 3 ML | Refills: 1 | Status: SHIPPED | OUTPATIENT
Start: 2017-08-10 | End: 2017-11-13 | Stop reason: SDUPTHER

## 2017-09-06 ENCOUNTER — PROCEDURE VISIT (OUTPATIENT)
Dept: PULMONOLOGY | Facility: CLINIC | Age: 72
End: 2017-09-06
Payer: MEDICARE

## 2017-09-06 ENCOUNTER — OFFICE VISIT (OUTPATIENT)
Dept: PULMONOLOGY | Facility: CLINIC | Age: 72
End: 2017-09-06
Payer: MEDICARE

## 2017-09-06 VITALS
OXYGEN SATURATION: 97 % | DIASTOLIC BLOOD PRESSURE: 82 MMHG | SYSTOLIC BLOOD PRESSURE: 124 MMHG | HEART RATE: 70 BPM | WEIGHT: 259 LBS | HEIGHT: 69 IN | BODY MASS INDEX: 38.36 KG/M2

## 2017-09-06 DIAGNOSIS — G47.33 OBSTRUCTIVE SLEEP APNEA SYNDROME: ICD-10-CM

## 2017-09-06 DIAGNOSIS — J44.89 ASTHMA WITH COPD: ICD-10-CM

## 2017-09-06 DIAGNOSIS — J45.20 MILD INTERMITTENT ASTHMA WITHOUT COMPLICATION: Primary | ICD-10-CM

## 2017-09-06 DIAGNOSIS — G47.33 OSA ON CPAP: ICD-10-CM

## 2017-09-06 DIAGNOSIS — Z77.090 ASBESTOS EXPOSURE: ICD-10-CM

## 2017-09-06 LAB
POST FEF 25 75: 2.54 L/S (ref 1.49–3.01)
POST FET 100: 10.08 S
POST FEV1 FVC: 78 %
POST FEV1: 2.7 L (ref 2.61–3.35)
POST FIF 50: 5.66 L/S
POST FVC: 3.46 L (ref 3.63–4.52)
POST PEF: 8.12 L/S (ref 6.74–8.93)
PRE FEF 25 75: 1.76 L/S (ref 1.49–3.01)
PRE FET 100: 12.18 S
PRE FEV1 FVC: 73 %
PRE FEV1: 2.58 L (ref 2.61–3.35)
PRE FIF 50: 6.39 L/S
PRE FVC: 3.53 L (ref 3.63–4.52)
PRE PEF: 8.82 L/S (ref 6.74–8.93)
PREDICTED FEV1 FVC: 73.4 % (ref 68.56–78.24)
PREDICTED FEV1: 2.98 L (ref 2.61–3.35)
PREDICTED FVC: 4.08 L (ref 3.63–4.52)
PROVOCATION PROTOCOL: ABNORMAL

## 2017-09-06 PROCEDURE — 99214 OFFICE O/P EST MOD 30 MIN: CPT | Mod: 25,S$GLB,, | Performed by: INTERNAL MEDICINE

## 2017-09-06 PROCEDURE — 94060 EVALUATION OF WHEEZING: CPT | Mod: S$GLB,,, | Performed by: INTERNAL MEDICINE

## 2017-09-06 PROCEDURE — 3079F DIAST BP 80-89 MM HG: CPT | Mod: S$GLB,,, | Performed by: INTERNAL MEDICINE

## 2017-09-06 PROCEDURE — 3008F BODY MASS INDEX DOCD: CPT | Mod: S$GLB,,, | Performed by: INTERNAL MEDICINE

## 2017-09-06 PROCEDURE — 3074F SYST BP LT 130 MM HG: CPT | Mod: S$GLB,,, | Performed by: INTERNAL MEDICINE

## 2017-09-06 PROCEDURE — 1126F AMNT PAIN NOTED NONE PRSNT: CPT | Mod: S$GLB,,, | Performed by: INTERNAL MEDICINE

## 2017-09-06 PROCEDURE — 1159F MED LIST DOCD IN RCRD: CPT | Mod: S$GLB,,, | Performed by: INTERNAL MEDICINE

## 2017-09-06 PROCEDURE — 99999 PR PBB SHADOW E&M-EST. PATIENT-LVL V: CPT | Mod: PBBFAC,,, | Performed by: INTERNAL MEDICINE

## 2017-09-06 NOTE — PROGRESS NOTES
Subjective:       Patient ID: Erendira Pretty is a 72 y.o. male.    Chief Complaint: He       Sleep Apnea and COPD    HPI     Obstructive Sleep Apnea:  No problems    COPD  He presents for evaluation and treatment of COPD. The patient is currently not having symptoms / an exacerbation. Current symptoms include acute dyspnea, cough productive of white sputum in small amounts and wheezing. Symptoms have been present since several weeks ago and have been gradually worsening. He denies chills and fever. Associated symptoms include poor exercise tolerance and shortness of breath.  This episode appears to have been triggered by no identifiable factor. Treatments tried for the current exacerbation: albuterol inhaler. The patient has been having similar episodes for approximately 10 years. He uses 1 pillows at night. Patient currently is not on home oxygen therapy.. The patient is having no constitutional symptoms, denying fever, chills, anorexia, or weight loss. The patient has not been hospitalized for this condition before. He quit smoking approximately many years ago. The patient is experiencing exercise intolerance (difficulty walking 1 blocks on flat ground).      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, phosphoric acid fumes        Past Medical History:   Diagnosis Date    Asthma     BPH (benign prostatic hyperplasia)     CAD (coronary artery disease)     40% lesion 2002;[facility]    Cirrhosis     Claudication 4/9/2014    Colon polyp     ED (erectile dysfunction)     Ex-smoker     Hearing loss NEC     Hepatitis C     Cured;reji brown 2015    Hyperlipidemia     Hypertension     Hypothyroid     s/p tx graves    Osteoarthritis     PVD (peripheral  vascular disease)     Sleep apnea     Type 2 diabetes mellitus      Past Surgical History:   Procedure Laterality Date    CARDIAC CATHETERIZATION      CARPAL TUNNEL RELEASE      CATARACT EXTRACTION      CATARACT EXTRACTION W/ INTRAOCULAR LENS  IMPLANT, BILATERAL  2008    COLONOSCOPY  8/2013    COLONOSCOPY N/A 5/30/2016    Procedure: COLONOSCOPY;  Surgeon: Anna Tomas MD;  Location[facility];  Service: Endoscopy;  Laterality: N/A;    ELBOW BURSA SURGERY  2010    EYE SURGERY      KNEE SURGERY      RECTAL SURGERY  2012    TRIGGER FINGER RELEASE       Social History     Social History    Marital status:      Spouse name: YAEL    Number of children: 2    Years of education: N/A     Occupational History    Not on file.     Social History Main Topics    Smoking status: Former Smoker     Packs/day: 0.50     Years: 4.00     Quit date: 6/12/1972    Smokeless tobacco: Former User     Types: Chew     Quit date: 11/18/1976    Alcohol use No      Comment: formerly drank, quit in late 2014    Drug use: No    Sexual activity: Yes     Partners: Female     Other Topics Concern    Not on file     Social History Narrative    . Lives with spouse. 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      Review of Systems   Constitutional: Negative for fever and fatigue.   HENT: Negative for postnasal drip and rhinorrhea.    Eyes: Negative for redness and itching.   Respiratory: Negative for cough, shortness of breath, wheezing, dyspnea on extertion and Paroxysmal Nocturnal Dyspnea.    Cardiovascular: Negative for chest pain.   Genitourinary: Negative for difficulty urinating and hematuria.   Endocrine: Negative for polyphagia, cold intolerance and heat intolerance.    Musculoskeletal: Negative for arthralgias.   Skin: Negative for rash.   Gastrointestinal: Negative for nausea, vomiting, abdominal pain and abdominal distention.   Neurological: Negative for dizziness and  headaches.   Hematological: Negative for adenopathy. Does not bruise/bleed easily and no excessive bruising.   Psychiatric/Behavioral: The patient is not nervous/anxious.        Objective:      Physical Exam   Constitutional: He is oriented to person, place, and time. He appears well-developed and well-nourished.   HENT:   Head: Normocephalic and atraumatic.   Eyes: Conjunctivae are normal. Pupils are equal, round, and reactive to light.   Neck: Neck supple. No JVD present. No tracheal deviation present. No thyromegaly present.   Cardiovascular: Normal rate, regular rhythm and normal heart sounds.    Pulmonary/Chest: Effort normal. He has decreased breath sounds.   Abdominal: Soft.   Musculoskeletal: Normal range of motion.   Lymphadenopathy:     He has no cervical adenopathy.   Neurological: He is alert and oriented to person, place, and time.   Skin: Skin is warm and dry.   Nursing note and vitals reviewed.    Personal Diagnostic Review  Chest x-ray: hyperinflation , mild interstitial changes  Spirometry: normal    No flowsheet data found.      Assessment:       1. Mild intermittent asthma without complication    2. DELONTE on CPAP    3. Obstructive sleep apnea syndrome    4. Asbestos exposure        Outpatient Encounter Prescriptions as of 9/6/2017   Medication Sig Dispense Refill    ACIPHEX 20 mg tablet TAKE 1 TABLET BY MOUTH TWO TIMES A DAY 60 tablet 11    albuterol 90 mcg/actuation inhaler Inhale 2 puffs into the lungs every 6 (six) hours. 1 Inhaler 12    aspirin (ECOTRIN) 81 MG EC tablet Take 81 mg by mouth once daily.      blood sugar diagnostic (BLOOD GLUCOSE TEST) Strp 1 strip by Misc.(Non-Drug; Combo Route) route 3 (three) times daily. accu-chek lux plus 100 strip 11    cycloSPORINE (RESTASIS) 0.05 % ophthalmic emulsion Place 0.4 mLs (1 drop total) into both eyes 2 (two) times daily. 60 each 11    ezetimibe (ZETIA) 10 mg tablet Take 1 tablet (10 mg total) by mouth once daily. 90 tablet 3     "GLUCOSAMINE HCL/CHONDR ROSARIO A NA (OSTEO BI-FLEX ORAL) Take 1 tablet by mouth once daily.      HUMALOG KWIKPEN 100 unit/mL InPn pen INJECT 3-4 UNITS INTO THE SKIN THREE TIMES DAILY BEFORE MEALS. 3 mL 1    insulin glargine (LANTUS SOLOSTAR) 100 unit/mL (3 mL) InPn pen Inject 40 Units into the skin once daily. 1 Box 11    lancets (ACCU-CHEK FASTCLIX) Misc Test 2-3 times daily 200 each 3    levocetirizine (XYZAL) 5 MG tablet Take 1 tablet (5 mg total) by mouth every evening. 30 tablet 11    metoprolol succinate (TOPROL-XL) 50 MG 24 hr tablet TAKE ONE BY MOUTH TWO TIMES A DAY. 60 tablet 11    mometasone (NASONEX) 50 mcg/actuation nasal spray 2 sprays by Nasal route once daily. 17 g 12    montelukast (SINGULAIR) 10 mg tablet   11    MV,CA,IRON,MIN/FA/PHYTOSTEROL (CENTRUM SPECIALIST HEART ORAL) Take 1 tablet by mouth 2 (two) times daily.      niacin, inositol niacinate, (NIACIN FLUSH FREE) 400 mg niacin (500 mg) Cap Take 500 mg by mouth every evening.  0    OMEGA-3 FATTY ACIDS/DHA/EPA (MEGARED PLANT-OMEGA-3 ORAL) Take 1 capsule by mouth once daily.      pen needle, diabetic (BD INSULIN PEN NEEDLE UF MINI) 31 gauge x 3/16" Ndle USE AS DIRECTED 100 each 11    psyllium (METAMUCIL) 0.52 gram capsule Take by mouth.      RESTASIS 0.05 % ophthalmic emulsion PLACE 1 DROP IN BOTH EYES TWO TIMES A DAY 60 each 0    saw palmetto 80 MG capsule Take 80 mg by mouth 2 (two) times daily.      sildenafil (REVATIO) 20 mg Tab Take 2 tablets one hour prior to intercourse. Max two per 24 hours 90 tablet 11    SYNTHROID 137 mcg Tab tablet Take 2 tablets (274 mcg total) by mouth before breakfast. 60 tablet 5    TWYNSTA 80-10 mg Tab TAKE ONE BY MOUTH EVERY DAY. 30 tablet 11    [DISCONTINUED] budesonide-formoterol 160-4.5 mcg (SYMBICORT) 160-4.5 mcg/actuation HFAA Inhale 2 puffs into the lungs 2 (two) times daily. 1 Inhaler 11     Facility-Administered Encounter Medications as of 9/6/2017   Medication Dose Route Frequency Provider " Last Rate Last Dose    dexamethasone injection 8 mg  8 mg Intramuscular Once Alejandro Parkinson MD         Orders Placed This Encounter   Procedures    CPAP/BIPAP SUPPLIES     [facility] for CPAP/Oxygen/Nebulizer supplies.  Customer Service: [phone]  Call: [phone]  Fax: [phone]  Billing Inquiries: [phone] or [phone]     Order Specific Question:   Type of mask:     Answer:   Nasal     Order Specific Question:   Headgear?     Answer:   Yes     Order Specific Question:   Tubing?     Answer:   Yes     Order Specific Question:   Humidifier chamber?     Answer:   Yes     Order Specific Question:   Chin strap?     Answer:   Yes     Order Specific Question:   Filters?     Answer:   Yes     Order Specific Question:   Other supplies:     Answer:   Give 90 day supply with 4 refills     Order Specific Question:   Length of need (1-99 months):     Answer:   99    X-Ray Chest PA And Lateral     Standing Status:   Future     Standing Expiration Date:   3/6/2019     Order Specific Question:   Reason for Exam:     Answer:   SOB    Spirometry with/without bronchodilator     Standing Status:   Future     Standing Expiration Date:   9/6/2018     Plan:       Requested Prescriptions      No prescriptions requested or ordered in this encounter     Mild intermittent asthma without complication  -     Spirometry with/without bronchodilator; Future; Expected date: 03/09/2018  -     X-Ray Chest PA And Lateral; Future; Expected date: 09/06/2017    DELONTE on CPAP  -     CPAP/BIPAP SUPPLIES    Obstructive sleep apnea syndrome    Asbestos exposure           Return in about 1 year (around 9/6/2018) for terry and  cxr, CPAP download.    MEDICAL DECISION MAKING: Moderate to high complexity.  Overall, the multiple problems listed are of moderate to high severity that may impact quality of life and activities of daily living. Side effects of medications, treatment plan as well as options and alternatives reviewed and discussed  with patient. There was counseling of patient concerning these issues.    Total time spent in face to face counseling and coordination of care - 25  minutes over 50% of time was used in discussion of prognosis, risks, benefits of treatment, instructions and compliance with regimen . Discussion with other physicians or health care providers (DME, NP, pharmacy, respiratory therapy) occurred.

## 2017-09-07 ENCOUNTER — NURSE TRIAGE (OUTPATIENT)
Dept: ADMINISTRATIVE | Facility: CLINIC | Age: 72
End: 2017-09-07

## 2017-09-13 ENCOUNTER — OFFICE VISIT (OUTPATIENT)
Dept: CARDIOLOGY | Facility: CLINIC | Age: 72
End: 2017-09-13
Payer: MEDICARE

## 2017-09-13 VITALS
HEIGHT: 69 IN | HEART RATE: 62 BPM | WEIGHT: 256.5 LBS | SYSTOLIC BLOOD PRESSURE: 140 MMHG | DIASTOLIC BLOOD PRESSURE: 78 MMHG | BODY MASS INDEX: 37.99 KG/M2

## 2017-09-13 DIAGNOSIS — E11.9 TYPE 2 DIABETES MELLITUS WITHOUT COMPLICATION, WITH LONG-TERM CURRENT USE OF INSULIN: Primary | ICD-10-CM

## 2017-09-13 DIAGNOSIS — R07.9 CHEST PAIN, MODERATE CORONARY ARTERY RISK: ICD-10-CM

## 2017-09-13 DIAGNOSIS — Z79.4 TYPE 2 DIABETES MELLITUS WITHOUT COMPLICATION, WITH LONG-TERM CURRENT USE OF INSULIN: Primary | ICD-10-CM

## 2017-09-13 DIAGNOSIS — I10 ESSENTIAL HYPERTENSION: ICD-10-CM

## 2017-09-13 DIAGNOSIS — I25.10 CORONARY ARTERY DISEASE INVOLVING NATIVE CORONARY ARTERY OF NATIVE HEART WITHOUT ANGINA PECTORIS: ICD-10-CM

## 2017-09-13 PROCEDURE — 99999 PR PBB SHADOW E&M-EST. PATIENT-LVL III: CPT | Mod: PBBFAC,,, | Performed by: INTERNAL MEDICINE

## 2017-09-13 PROCEDURE — 3077F SYST BP >= 140 MM HG: CPT | Mod: S$GLB,,, | Performed by: INTERNAL MEDICINE

## 2017-09-13 PROCEDURE — 4010F ACE/ARB THERAPY RXD/TAKEN: CPT | Mod: S$GLB,,, | Performed by: INTERNAL MEDICINE

## 2017-09-13 PROCEDURE — 3078F DIAST BP <80 MM HG: CPT | Mod: S$GLB,,, | Performed by: INTERNAL MEDICINE

## 2017-09-13 PROCEDURE — 3044F HG A1C LEVEL LT 7.0%: CPT | Mod: S$GLB,,, | Performed by: INTERNAL MEDICINE

## 2017-09-13 PROCEDURE — 3008F BODY MASS INDEX DOCD: CPT | Mod: S$GLB,,, | Performed by: INTERNAL MEDICINE

## 2017-09-13 PROCEDURE — 1126F AMNT PAIN NOTED NONE PRSNT: CPT | Mod: S$GLB,,, | Performed by: INTERNAL MEDICINE

## 2017-09-13 PROCEDURE — 99215 OFFICE O/P EST HI 40 MIN: CPT | Mod: S$GLB,,, | Performed by: INTERNAL MEDICINE

## 2017-09-13 PROCEDURE — 1159F MED LIST DOCD IN RCRD: CPT | Mod: S$GLB,,, | Performed by: INTERNAL MEDICINE

## 2017-09-13 NOTE — PROGRESS NOTES
Subjective:   Patient ID:  Erendira Pretty is a 72 y.o. male who presents for follow-up of Hypertension; Leg Swelling; and Chest Pain (started 3 weeks ago with exercise only)  Pt also with CP occasional some atypical.    Hypertension   This is a chronic problem. The current episode started more than 1 year ago. The problem has been gradually improving since onset. The problem is controlled. Pertinent negatives include no chest pain, palpitations or shortness of breath. Past treatments include beta blockers and diuretics. The current treatment provides moderate improvement. Compliance problems include exercise.    Hyperlipidemia   This is a chronic problem. The problem is controlled. Recent lipid tests were reviewed and are variable. Pertinent negatives include no chest pain or shortness of breath. Current antihyperlipidemic treatment includes ezetimibe and nicotinic acid. The current treatment provides moderate improvement of lipids. Compliance problems include medication side effects.    Coronary Artery Disease   Presents for follow-up visit. Pertinent negatives include no chest pain, chest pressure, chest tightness, dizziness, leg swelling, muscle weakness, palpitations, shortness of breath or weight gain. Risk factors include hyperlipidemia. The symptoms have been stable. Compliance with diet is variable. Compliance with exercise is variable. Compliance with medications is good.       Review of Systems   Constitution: Negative. Negative for weight gain.   HENT: Negative.    Eyes: Negative.    Cardiovascular: Negative.  Negative for chest pain, leg swelling and palpitations.   Respiratory: Negative.  Negative for chest tightness and shortness of breath.    Endocrine: Negative.    Hematologic/Lymphatic: Negative.    Skin: Negative.    Musculoskeletal: Negative for muscle weakness.   Gastrointestinal: Negative.    Genitourinary: Negative.    Neurological: Negative.  Negative for dizziness.   Psychiatric/Behavioral:  Negative.    Allergic/Immunologic: Negative.      Family History   Problem Relation Age of Onset    Heart disease Mother     Heart disease Father     Heart disease Brother     Colon cancer Neg Hx      Past Medical History:   Diagnosis Date    Asthma     BPH (benign prostatic hyperplasia)     CAD (coronary artery disease)     40% lesion 2002;[facility]    Cirrhosis     Claudication 4/9/2014    Colon polyp     ED (erectile dysfunction)     Ex-smoker     Hearing loss NEC     Hepatitis C     Cured;reji brown 2015    Hyperlipidemia     Hypertension     Hypothyroid     s/p tx graves    Osteoarthritis     PVD (peripheral vascular disease)     Sleep apnea     Type 2 diabetes mellitus      Current Outpatient Prescriptions on File Prior to Visit   Medication Sig Dispense Refill    ACIPHEX 20 mg tablet TAKE 1 TABLET BY MOUTH TWO TIMES A DAY 60 tablet 11    albuterol 90 mcg/actuation inhaler Inhale 2 puffs into the lungs every 6 (six) hours. 1 Inhaler 12    aspirin (ECOTRIN) 81 MG EC tablet Take 81 mg by mouth once daily.      blood sugar diagnostic (BLOOD GLUCOSE TEST) Strp 1 strip by Misc.(Non-Drug; Combo Route) route 3 (three) times daily. accu-chek lux plus 100 strip 11    cycloSPORINE (RESTASIS) 0.05 % ophthalmic emulsion Place 0.4 mLs (1 drop total) into both eyes 2 (two) times daily. 60 each 11    ezetimibe (ZETIA) 10 mg tablet Take 1 tablet (10 mg total) by mouth once daily. 90 tablet 3    GLUCOSAMINE HCL/CHONDR ROSARIO A NA (OSTEO BI-FLEX ORAL) Take 1 tablet by mouth once daily.      HUMALOG KWIKPEN 100 unit/mL InPn pen INJECT 3-4 UNITS INTO THE SKIN THREE TIMES DAILY BEFORE MEALS. 3 mL 1    insulin glargine (LANTUS SOLOSTAR) 100 unit/mL (3 mL) InPn pen Inject 40 Units into the skin once daily. 1 Box 11    lancets (ACCU-CHEK FASTCLIX) Misc Test 2-3 times daily 200 each 3    levocetirizine (XYZAL) 5 MG tablet Take 1 tablet (5 mg total) by mouth every evening. 30 tablet 11    metoprolol  "succinate (TOPROL-XL) 50 MG 24 hr tablet TAKE ONE BY MOUTH TWO TIMES A DAY. 60 tablet 11    mometasone (NASONEX) 50 mcg/actuation nasal spray 2 sprays by Nasal route once daily. 17 g 12    montelukast (SINGULAIR) 10 mg tablet   11    MV,CA,IRON,MIN/FA/PHYTOSTEROL (CENTRUM SPECIALIST HEART ORAL) Take 1 tablet by mouth 2 (two) times daily.      niacin, inositol niacinate, (NIACIN FLUSH FREE) 400 mg niacin (500 mg) Cap Take 500 mg by mouth every evening.  0    OMEGA-3 FATTY ACIDS/DHA/EPA (MEGARED PLANT-OMEGA-3 ORAL) Take 1 capsule by mouth once daily.      pen needle, diabetic (BD INSULIN PEN NEEDLE UF MINI) 31 gauge x 3/16" Ndle USE AS DIRECTED 100 each 11    psyllium (METAMUCIL) 0.52 gram capsule Take by mouth.      RESTASIS 0.05 % ophthalmic emulsion PLACE 1 DROP IN BOTH EYES TWO TIMES A DAY 60 each 0    saw palmetto 80 MG capsule Take 80 mg by mouth 2 (two) times daily.      sildenafil (REVATIO) 20 mg Tab Take 2 tablets one hour prior to intercourse. Max two per 24 hours 90 tablet 11    SYNTHROID 137 mcg Tab tablet Take 2 tablets (274 mcg total) by mouth before breakfast. 60 tablet 5    TWYNSTA 80-10 mg Tab TAKE ONE BY MOUTH EVERY DAY. 30 tablet 11     Current Facility-Administered Medications on File Prior to Visit   Medication Dose Route Frequency Provider Last Rate Last Dose    dexamethasone injection 8 mg  8 mg Intramuscular Once Alejandro Parkinson MD         Review of patient's allergies indicates:   Allergen Reactions    Iodinated contrast- oral and iv dye     Omeprazole     Prilosec [omeprazole magnesium]        Objective:     Physical Exam   Constitutional: He is oriented to person, place, and time. He appears well-developed and well-nourished.   HENT:   Head: Normocephalic and atraumatic.   Eyes: Conjunctivae are normal. Pupils are equal, round, and reactive to light.   Neck: Normal range of motion. Neck supple.   Cardiovascular: Normal rate, regular rhythm, normal heart sounds and intact distal " pulses.    Pulmonary/Chest: Effort normal and breath sounds normal.   Abdominal: Soft. Bowel sounds are normal.   Neurological: He is alert and oriented to person, place, and time.   Skin: Skin is warm and dry.   Psychiatric: He has a normal mood and affect.   Nursing note and vitals reviewed.      Assessment:     1. Type 2 diabetes mellitus without complication, with long-term current use of insulin    2. Essential hypertension    3. Coronary artery disease involving native coronary artery of native heart without angina pectoris    4. Chest pain, moderate coronary artery risk        Plan:     Type 2 diabetes mellitus without complication, with long-term current use of insulin    Essential hypertension    Coronary artery disease involving native coronary artery of native heart without angina pectoris    Chest pain, moderate coronary artery risk      Add hctz  Stress test  Continue asa- cad  Continue niacin, zetia-hlp  Continue metoprolol-htn

## 2017-09-14 ENCOUNTER — TELEPHONE (OUTPATIENT)
Dept: CARDIOLOGY | Facility: CLINIC | Age: 72
End: 2017-09-14

## 2017-09-14 RX ORDER — HYDROCHLOROTHIAZIDE 12.5 MG/1
12.5 TABLET ORAL DAILY
COMMUNITY
End: 2017-09-14 | Stop reason: SDUPTHER

## 2017-09-14 NOTE — TELEPHONE ENCOUNTER
----- Message from Vannessa May sent at 9/14/2017 11:24 AM CDT -----  Contact: wife  States the pt fluid medication was not called in, the pt wife request a call to  [phone]///[id number]

## 2017-09-15 RX ORDER — HYDROCHLOROTHIAZIDE 12.5 MG/1
12.5 TABLET ORAL DAILY
Qty: 30 TABLET | Refills: 2 | Status: SHIPPED | OUTPATIENT
Start: 2017-09-15 | End: 2018-01-03 | Stop reason: SDUPTHER

## 2017-09-18 ENCOUNTER — OFFICE VISIT (OUTPATIENT)
Dept: OTOLARYNGOLOGY | Facility: CLINIC | Age: 72
End: 2017-09-18
Payer: MEDICARE

## 2017-09-18 VITALS
HEART RATE: 70 BPM | DIASTOLIC BLOOD PRESSURE: 80 MMHG | SYSTOLIC BLOOD PRESSURE: 166 MMHG | BODY MASS INDEX: 38.45 KG/M2 | WEIGHT: 256.63 LBS

## 2017-09-18 DIAGNOSIS — J32.4 CHRONIC PANSINUSITIS: Primary | ICD-10-CM

## 2017-09-18 PROCEDURE — 3079F DIAST BP 80-89 MM HG: CPT | Mod: S$GLB,,, | Performed by: OTOLARYNGOLOGY

## 2017-09-18 PROCEDURE — 99213 OFFICE O/P EST LOW 20 MIN: CPT | Mod: S$GLB,,, | Performed by: OTOLARYNGOLOGY

## 2017-09-18 PROCEDURE — 3077F SYST BP >= 140 MM HG: CPT | Mod: S$GLB,,, | Performed by: OTOLARYNGOLOGY

## 2017-09-18 PROCEDURE — 3008F BODY MASS INDEX DOCD: CPT | Mod: S$GLB,,, | Performed by: OTOLARYNGOLOGY

## 2017-09-18 PROCEDURE — 1159F MED LIST DOCD IN RCRD: CPT | Mod: S$GLB,,, | Performed by: OTOLARYNGOLOGY

## 2017-09-18 PROCEDURE — 99999 PR PBB SHADOW E&M-EST. PATIENT-LVL III: CPT | Mod: PBBFAC,,, | Performed by: OTOLARYNGOLOGY

## 2017-09-18 RX ORDER — MOMETASONE FUROATE 50 UG/1
2 SPRAY, METERED NASAL DAILY
Qty: 17 G | Refills: 12 | Status: SHIPPED | OUTPATIENT
Start: 2017-09-18 | End: 2018-12-03 | Stop reason: SDUPTHER

## 2017-09-18 NOTE — PROGRESS NOTES
Referring Provider:    No referring provider defined for this encounter.  Subjective:   Patient: Erendira Pretty [id number], :1945   Visit date:2017 2:26 PM    Chief Complaint:  Follow-up    HPI:  Erendira is a 72 y.o. male who I was asked to see in consultation for evaluation of the following issue(s):    Patient was initially seen in 2017.  He reported significant nasal obstruction worse on the left than the right.  He also reported facial pain, pressure and discomfort.  He had had nasal polypectomy performed twice with the last one in about the year .  He had never had ALLERGY testing performed.  He reported moderate ALLERGIC symptoms of nasal congestion, nasal itchiness and rhinorrhea.  No imaging had been performed prior to seeing me.  He had been started on Augmentin shortly before his initial clinic visit and was having significant gastrointestinal issues with this.  He does have asthma.  Nasal endoscopy revealed bilateral nasal polyps with a very large polyp on the left side.  This was removed.  I placed him on levofloxacin, Nasonex twice a day and Astelin twice a day.  He was not given steroids due to his diabetes.      He return to clinic 2017.  He reports that his nasal obstruction is significantly better.  Additionally he is no longer having facial pain or pressure.  He denies significant nasal drainage.  He is having a persistent cough that is been lingering for about 2 months now.  He was on Singulair in the past but has been off of that for some time.  He is having to use his inhalers, specifically his albuterol inhaler multiple times daily.  ALLERGY testing by blood work was performed and revealed numerous class III ALLERGIES.  Erendira returned April 3, 2017.  I had directed the patient to use Nasonex, Astelin, Xyzal and Singulair.  He stopped using the nasal sprays about 1 month ago but continued oral medications.  Despite this, he reported that his breathing through  the nose had dramatically improved.  He had no facial pressure or pain.  He had minimal nasal drainage.  His cough had resolved.  Overall he was feeling very good.  Then plan at this point, was to resume Nasonex and continue with Xyzal and Singulair.     When he returned May 15, 2017, he had been using Nasonex, Xyzal and Singulair for his allergy regimen. Over the past week, he has had increased post nasal and cough.  The cough somewhat improved but the sore throat has gotten worse.  The post nasal drip has been worsening over the same period of time but may be mildly better in the past 2 days.  The sore throat is the most bothersome issue at this point and he has some difficulty swallowing.  Severity- moderate to severe. Quality- burning.  Modifying factors- none, no improvement with choraseptic spray.  Assoc ssx- nasal drainage, cough    Sept 18, 2017  He has been Xyzal regularly.  He uses Nasonex and Flonase PRN.  No pressure or pain.  No drainage. Feeling well.      FINAL PATHOLOGIC DIAGNOSIS  SOFT TISSUE, LEFT NASAL CAVITY (EXCISION):  Benign respiratory mucosa with abundant eosinophils (up to 80 per 1 high-power field)  Diagnosed by: Fiona Zamora M.D.  (Electronically Signed: 2017-01-11 12:05:27)    Review of Systems:  -     Allergic/Immunologic: is allergic to iodinated contrast- oral and iv dye; omeprazole; and prilosec [omeprazole magnesium]..  -     Constitutional: Current temp:        His meds, allergies, medical, surgical, social & family histories were reviewed & updated:  -     He has a current medication list which includes the following prescription(s): aciphex, albuterol, aspirin, blood sugar diagnostic, cyclosporine, ezetimibe, glucosamine/chondr leach a sod, humalog kwikpen, hydrochlorothiazide, insulin glargine, lancets, levocetirizine, metoprolol succinate, mometasone, multivit,iron,mins/folic acid, niacin (inositol niacinate), omega-3 fatty acids/dha/epa, pen needle, diabetic, psyllium, restasis,  saw palmetto, sildenafil, synthroid, and twynsta, and the following Facility-Administered Medications: dexamethasone.  -     He  has a past medical history of Asthma; BPH (benign prostatic hyperplasia); CAD (coronary artery disease); Cirrhosis; Claudication (4/9/2014); Colon polyp; ED (erectile dysfunction); Ex-smoker; Hearing loss NEC; Hepatitis C; Hyperlipidemia; Hypertension; Hypothyroid; Osteoarthritis; PVD (peripheral vascular disease); Sleep apnea; and Type 2 diabetes mellitus.   -     He  does not have any pertinent problems on file.   -     He  has a past surgical history that includes Knee surgery; Trigger finger release; Carpal tunnel release; Elbow bursa surgery (2010); Rectal surgery (2012); Eye surgery; Cataract extraction w/ intraocular lens  implant, bilateral (2008); Cataract extraction; Cardiac catheterization; Colonoscopy (8/2013); and Colonoscopy (N/A, 5/30/2016).  -     He  reports that he quit smoking about 45 years ago. He has a 2.00 pack-year smoking history. He quit smokeless tobacco use about 40 years ago. His smokeless tobacco use included Chew. He reports that he does not drink alcohol or use drugs.  -     His family history includes Heart disease in his brother, father, and mother.  -     He is allergic to iodinated contrast- oral and iv dye; omeprazole; and prilosec [omeprazole magnesium].    Objective:     Physical Exam:  Vitals:    BP (!) 166/80   Pulse 70   Wt 116.4 kg (256 lb 9.9 oz)   BMI 38.45 kg/m²   General appearance:  Well developed, well nourished    Eyes:  Extraocular motions intact, PERRL    Communication:  no hoarseness, no dysphonia    Ears:  Otoscopy of external auditory canals and tympanic membranes was normal, clinical speech reception thresholds grossly intact, no mass/lesion of auricle.  Nose:  No masses/lesions of external nose.  No anterior septal deviation. Anterior rhinoscopy unremarkable.  Mouth:  No mass/lesion of lips, teeth, gums, hard/soft palate, tongue,  tonsils, or oropharynx.    Cardiovascular:  No pedal edema; Radial Pulses +2     Neck & Lymphatics:  No cervical lymphadenopathy, no neck mass/crepitus/ asymmetry, trachea is midline, no thyroid enlargement/tenderness/mass.    Psych: Oriented x3,  Alert with normal mood and affect.     Respiration/Chest:  Symmetric expansion during respiration, normal respiratory effort.    Skin:  Warm and intact. No ulcerations of face, scalp, neck.    Assessment & Plan:   Doing well.  We discussed his medical conditions, treatments and plan.  Erendira should return to clinic if any issues arise (symptoms worsen or persist), otherwise we will see him back in the clinic only as needed.

## 2017-09-20 ENCOUNTER — HOSPITAL ENCOUNTER (OUTPATIENT)
Dept: RADIOLOGY | Facility: HOSPITAL | Age: 72
Discharge: HOME | End: 2017-09-20
Attending: INTERNAL MEDICINE
Payer: MEDICARE

## 2017-09-20 ENCOUNTER — CLINICAL SUPPORT (OUTPATIENT)
Dept: CARDIOLOGY | Facility: CLINIC | Age: 72
End: 2017-09-20
Payer: MEDICARE

## 2017-09-20 DIAGNOSIS — I25.10 CORONARY ARTERY DISEASE INVOLVING NATIVE CORONARY ARTERY OF NATIVE HEART WITHOUT ANGINA PECTORIS: ICD-10-CM

## 2017-09-20 DIAGNOSIS — R07.9 CHEST PAIN, MODERATE CORONARY ARTERY RISK: ICD-10-CM

## 2017-09-20 LAB — DIASTOLIC DYSFUNCTION: NO

## 2017-09-20 PROCEDURE — 78452 HT MUSCLE IMAGE SPECT MULT: CPT | Mod: 26,,, | Performed by: NUCLEAR MEDICINE

## 2017-09-20 PROCEDURE — 78452 HT MUSCLE IMAGE SPECT MULT: CPT | Mod: TC,PO

## 2017-09-20 PROCEDURE — 93015 CV STRESS TEST SUPVJ I&R: CPT | Mod: S$GLB,,, | Performed by: NUCLEAR MEDICINE

## 2017-10-13 ENCOUNTER — TELEPHONE (OUTPATIENT)
Dept: RADIOLOGY | Facility: HOSPITAL | Age: 72
End: 2017-10-13

## 2017-10-16 ENCOUNTER — OFFICE VISIT (OUTPATIENT)
Dept: OPHTHALMOLOGY | Facility: CLINIC | Age: 72
End: 2017-10-16
Payer: MEDICARE

## 2017-10-16 ENCOUNTER — HOSPITAL ENCOUNTER (OUTPATIENT)
Dept: RADIOLOGY | Facility: HOSPITAL | Age: 72
Discharge: HOME | End: 2017-10-16
Attending: INTERNAL MEDICINE
Payer: MEDICARE

## 2017-10-16 DIAGNOSIS — E11.9 TYPE 2 DIABETES MELLITUS WITHOUT COMPLICATION, WITH LONG-TERM CURRENT USE OF INSULIN: Primary | ICD-10-CM

## 2017-10-16 DIAGNOSIS — H04.129 DRYNESS, EYE: ICD-10-CM

## 2017-10-16 DIAGNOSIS — H35.373 EPIRETINAL MEMBRANE (ERM) OF BOTH EYES: ICD-10-CM

## 2017-10-16 DIAGNOSIS — K74.69 COMPENSATED HCV CIRRHOSIS: ICD-10-CM

## 2017-10-16 DIAGNOSIS — Z79.4 TYPE 2 DIABETES MELLITUS WITHOUT COMPLICATION, WITH LONG-TERM CURRENT USE OF INSULIN: Primary | ICD-10-CM

## 2017-10-16 DIAGNOSIS — B19.20 COMPENSATED HCV CIRRHOSIS: ICD-10-CM

## 2017-10-16 DIAGNOSIS — I10 ESSENTIAL HYPERTENSION: ICD-10-CM

## 2017-10-16 PROCEDURE — 99999 PR PBB SHADOW E&M-EST. PATIENT-LVL II: CPT | Mod: PBBFAC,,, | Performed by: OPHTHALMOLOGY

## 2017-10-16 PROCEDURE — 76705 ECHO EXAM OF ABDOMEN: CPT | Mod: TC,PO

## 2017-10-16 PROCEDURE — 92014 COMPRE OPH EXAM EST PT 1/>: CPT | Mod: S$GLB,,, | Performed by: OPHTHALMOLOGY

## 2017-10-16 PROCEDURE — 76705 ECHO EXAM OF ABDOMEN: CPT | Mod: 26,,, | Performed by: RADIOLOGY

## 2017-10-16 RX ORDER — CYCLOSPORINE 0.5 MG/ML
1 EMULSION OPHTHALMIC 2 TIMES DAILY
Qty: 60 EACH | Refills: 11 | Status: SHIPPED | OUTPATIENT
Start: 2017-10-16 | End: 2018-10-16

## 2017-10-16 RX ORDER — SAW PALMETTO 160 MG
80 CAPSULE ORAL
COMMUNITY
End: 2017-10-27 | Stop reason: SDUPTHER

## 2017-10-16 NOTE — PROGRESS NOTES
l    ===============================  10/16/2017   Erendira Pretty,   72 y.o. male   Last visit [facility]: :10/17/2016   Last visit eye dept. Visit date not found  [facility]:  Uncorrected distance visual acuity was 20/20 in the right eye and 20/25 in the left eye.  Tonometry     Tonometry (Applanation, 9:04 AM)       Right Left    Pressure 21 19               Not recorded         Not recorded        Chief Complaint   Patient presents with    Diabetic Eye Exam     YEARLY DIABETIC EXAM        HPI     Diabetic Eye Exam    Additional comments: YEARLY DIABETIC EXAM           Comments   + DIET CONTROLLED DM  MINI ERM OU  DRY EYES  ON RESTASIS  OU LL PLUGS  Very symptomtaic  restais bid  Vf 11/18/14  erm ou       Last edited by Madeline Cash on 10/16/2017  8:57 AM. (History)          ________________  10/16/2017  Problem List Items Addressed This Visit        Eye/Vision problems    Type 2 diabetes mellitus without complication - Primary    Epiretinal membrane - Both Eyes       Other    Essential hypertension    Dryness, eye - Both Eyes      Other Visit Diagnoses    None.         .no BDR  No HTNR  Stable ERM OS, ILM contracture OD  Dry eyes much better on restasis  continue restasis ou bid  rtc 1 ydar, goct       ===========================

## 2017-10-27 ENCOUNTER — OFFICE VISIT (OUTPATIENT)
Dept: CARDIOLOGY | Facility: CLINIC | Age: 72
End: 2017-10-27
Payer: MEDICARE

## 2017-10-27 VITALS
BODY MASS INDEX: 38.65 KG/M2 | HEART RATE: 70 BPM | HEIGHT: 69 IN | SYSTOLIC BLOOD PRESSURE: 128 MMHG | WEIGHT: 260.94 LBS | DIASTOLIC BLOOD PRESSURE: 68 MMHG

## 2017-10-27 DIAGNOSIS — I25.10 CORONARY ARTERY DISEASE INVOLVING NATIVE CORONARY ARTERY OF NATIVE HEART WITHOUT ANGINA PECTORIS: ICD-10-CM

## 2017-10-27 DIAGNOSIS — Z79.4 TYPE 2 DIABETES MELLITUS WITHOUT COMPLICATION, WITH LONG-TERM CURRENT USE OF INSULIN: Primary | ICD-10-CM

## 2017-10-27 DIAGNOSIS — R53.83 OTHER FATIGUE: ICD-10-CM

## 2017-10-27 DIAGNOSIS — E11.9 TYPE 2 DIABETES MELLITUS WITHOUT COMPLICATION, WITH LONG-TERM CURRENT USE OF INSULIN: Primary | ICD-10-CM

## 2017-10-27 DIAGNOSIS — I49.3 PVC (PREMATURE VENTRICULAR CONTRACTION): ICD-10-CM

## 2017-10-27 DIAGNOSIS — G47.33 OBSTRUCTIVE SLEEP APNEA SYNDROME: ICD-10-CM

## 2017-10-27 DIAGNOSIS — I10 ESSENTIAL HYPERTENSION: ICD-10-CM

## 2017-10-27 PROCEDURE — 99999 PR PBB SHADOW E&M-EST. PATIENT-LVL III: CPT | Mod: PBBFAC,,, | Performed by: INTERNAL MEDICINE

## 2017-10-27 PROCEDURE — 99214 OFFICE O/P EST MOD 30 MIN: CPT | Mod: S$GLB,,, | Performed by: INTERNAL MEDICINE

## 2017-10-27 RX ORDER — AMLODIPINE AND VALSARTAN 10; 320 MG/1; MG/1
1 TABLET ORAL DAILY
Qty: 90 TABLET | Refills: 3 | Status: SHIPPED | OUTPATIENT
Start: 2017-10-27 | End: 2017-10-27 | Stop reason: SDUPTHER

## 2017-10-27 RX ORDER — AMLODIPINE AND VALSARTAN 10; 320 MG/1; MG/1
1 TABLET ORAL DAILY
Qty: 90 TABLET | Refills: 3 | Status: SHIPPED | OUTPATIENT
Start: 2017-10-27 | End: 2018-11-12

## 2017-11-03 ENCOUNTER — OFFICE VISIT (OUTPATIENT)
Dept: GASTROENTEROLOGY | Facility: CLINIC | Age: 72
End: 2017-11-03
Payer: MEDICARE

## 2017-11-03 VITALS
BODY MASS INDEX: 38.2 KG/M2 | HEIGHT: 69 IN | HEART RATE: 70 BPM | DIASTOLIC BLOOD PRESSURE: 82 MMHG | WEIGHT: 257.94 LBS | SYSTOLIC BLOOD PRESSURE: 130 MMHG

## 2017-11-03 DIAGNOSIS — I85.10 SECONDARY ESOPHAGEAL VARICES WITHOUT BLEEDING: ICD-10-CM

## 2017-11-03 DIAGNOSIS — K74.69 OTHER CIRRHOSIS OF LIVER: Primary | ICD-10-CM

## 2017-11-03 PROCEDURE — 99999 PR PBB SHADOW E&M-EST. PATIENT-LVL III: CPT | Mod: PBBFAC,,, | Performed by: INTERNAL MEDICINE

## 2017-11-03 PROCEDURE — 99213 OFFICE O/P EST LOW 20 MIN: CPT | Mod: S$GLB,,, | Performed by: INTERNAL MEDICINE

## 2017-11-13 RX ORDER — INSULIN LISPRO 100 [IU]/ML
INJECTION, SOLUTION INTRAVENOUS; SUBCUTANEOUS
Qty: 3 ML | Refills: 11 | Status: SHIPPED | OUTPATIENT
Start: 2017-11-13 | End: 2018-12-18 | Stop reason: SDUPTHER

## 2017-11-29 ENCOUNTER — LAB VISIT (OUTPATIENT)
Dept: LAB | Facility: HOSPITAL | Age: 72
End: 2017-11-29
Attending: FAMILY MEDICINE
Payer: MEDICARE

## 2017-11-29 DIAGNOSIS — E11.9 TYPE 2 DIABETES MELLITUS WITHOUT COMPLICATION, WITH LONG-TERM CURRENT USE OF INSULIN: ICD-10-CM

## 2017-11-29 DIAGNOSIS — Z79.4 TYPE 2 DIABETES MELLITUS WITHOUT COMPLICATION, WITH LONG-TERM CURRENT USE OF INSULIN: ICD-10-CM

## 2017-11-29 LAB
ALBUMIN SERPL BCP-MCNC: 3.5 G/DL
ALP SERPL-CCNC: 71 U/L
ALT SERPL W/O P-5'-P-CCNC: 27 U/L
ANION GAP SERPL CALC-SCNC: 9 MMOL/L
AST SERPL-CCNC: 30 U/L
BILIRUB SERPL-MCNC: 0.5 MG/DL
BUN SERPL-MCNC: 18 MG/DL
CALCIUM SERPL-MCNC: 9.3 MG/DL
CHLORIDE SERPL-SCNC: 103 MMOL/L
CHOLEST SERPL-MCNC: 176 MG/DL
CHOLEST/HDLC SERPL: 4.3 {RATIO}
CO2 SERPL-SCNC: 28 MMOL/L
CREAT SERPL-MCNC: 1.2 MG/DL
EST. GFR  (AFRICAN AMERICAN): >60 ML/MIN/1.73 M^2
EST. GFR  (NON AFRICAN AMERICAN): >60 ML/MIN/1.73 M^2
ESTIMATED AVG GLUCOSE: 134 MG/DL
GLUCOSE SERPL-MCNC: 125 MG/DL
HBA1C MFR BLD HPLC: 6.3 %
HDLC SERPL-MCNC: 41 MG/DL
HDLC SERPL: 23.3 %
LDLC SERPL CALC-MCNC: 96.6 MG/DL
NONHDLC SERPL-MCNC: 135 MG/DL
POTASSIUM SERPL-SCNC: 4.7 MMOL/L
PROT SERPL-MCNC: 7.7 G/DL
SODIUM SERPL-SCNC: 140 MMOL/L
T4 FREE SERPL-MCNC: 0.85 NG/DL
TRIGL SERPL-MCNC: 192 MG/DL
TSH SERPL DL<=0.005 MIU/L-ACNC: 17.55 UIU/ML

## 2017-11-29 PROCEDURE — 83036 HEMOGLOBIN GLYCOSYLATED A1C: CPT

## 2017-11-29 PROCEDURE — 80053 COMPREHEN METABOLIC PANEL: CPT

## 2017-11-29 PROCEDURE — 84439 ASSAY OF FREE THYROXINE: CPT

## 2017-11-29 PROCEDURE — 80061 LIPID PANEL: CPT

## 2017-11-29 PROCEDURE — 36415 COLL VENOUS BLD VENIPUNCTURE: CPT | Mod: PO

## 2017-11-29 PROCEDURE — 84443 ASSAY THYROID STIM HORMONE: CPT

## 2017-12-05 NOTE — TELEPHONE ENCOUNTER
----- Message from Dinorah Joyce sent at 12/5/2017  3:05 PM CST -----  Contact: pt's wife  She's calling to get a refill...    1. What is the name of the medication you are requesting? Synthyoid  2. What is the dose? 137 mg  3. How do you take the medication? Orally, topically, etc? orally  4. How often do you take this medication? Twice daily  5. Do you need a 30 day or 90 day supply? 30  6. How many refills are you requesting? 1  7. What is your preferred pharmacy and location of the pharmacy? [facility]  8. Who can we contact with further questions? [phone] ([place])

## 2017-12-05 NOTE — TELEPHONE ENCOUNTER
----- Message from Zuly Alvarenga sent at 12/5/2017 11:24 AM CST -----  Contact: Patients wife, heaven Parmar states she left an earlier message but will be leaving home and needs this number to be called back [phone]. Thank you

## 2017-12-06 RX ORDER — LEVOTHYROXINE SODIUM 137 UG/1
TABLET ORAL
Qty: 60 TABLET | Refills: 5 | Status: SHIPPED | OUTPATIENT
Start: 2017-12-06 | End: 2018-05-31 | Stop reason: SDUPTHER

## 2017-12-11 ENCOUNTER — OFFICE VISIT (OUTPATIENT)
Dept: INTERNAL MEDICINE | Facility: CLINIC | Age: 72
End: 2017-12-11
Payer: MEDICARE

## 2017-12-11 VITALS
HEART RATE: 61 BPM | OXYGEN SATURATION: 96 % | TEMPERATURE: 98 F | HEIGHT: 68 IN | DIASTOLIC BLOOD PRESSURE: 76 MMHG | SYSTOLIC BLOOD PRESSURE: 138 MMHG | WEIGHT: 261.69 LBS | BODY MASS INDEX: 39.66 KG/M2

## 2017-12-11 DIAGNOSIS — E03.9 ACQUIRED HYPOTHYROIDISM: ICD-10-CM

## 2017-12-11 DIAGNOSIS — E11.9 TYPE 2 DIABETES MELLITUS WITHOUT COMPLICATION, WITH LONG-TERM CURRENT USE OF INSULIN: Primary | ICD-10-CM

## 2017-12-11 DIAGNOSIS — I73.9 PVD (PERIPHERAL VASCULAR DISEASE): ICD-10-CM

## 2017-12-11 DIAGNOSIS — I10 ESSENTIAL HYPERTENSION: ICD-10-CM

## 2017-12-11 DIAGNOSIS — I37.1 PULMONARY VALVE INSUFFICIENCY, UNSPECIFIED ETIOLOGY: ICD-10-CM

## 2017-12-11 DIAGNOSIS — Z79.4 TYPE 2 DIABETES MELLITUS WITHOUT COMPLICATION, WITH LONG-TERM CURRENT USE OF INSULIN: Primary | ICD-10-CM

## 2017-12-11 DIAGNOSIS — I77.89 OTHER SPECIFIED DISORDERS OF ARTERIES AND ARTERIOLES (CODE): ICD-10-CM

## 2017-12-11 DIAGNOSIS — I25.10 CORONARY ARTERY DISEASE INVOLVING NATIVE CORONARY ARTERY OF NATIVE HEART WITHOUT ANGINA PECTORIS: ICD-10-CM

## 2017-12-11 DIAGNOSIS — K74.60 CIRRHOSIS OF LIVER WITHOUT ASCITES, UNSPECIFIED HEPATIC CIRRHOSIS TYPE: ICD-10-CM

## 2017-12-11 DIAGNOSIS — J45.909 UNCOMPLICATED ASTHMA, UNSPECIFIED ASTHMA SEVERITY, UNSPECIFIED WHETHER PERSISTENT: ICD-10-CM

## 2017-12-11 DIAGNOSIS — Z12.5 SCREENING FOR PROSTATE CANCER: ICD-10-CM

## 2017-12-11 PROCEDURE — 99214 OFFICE O/P EST MOD 30 MIN: CPT | Mod: S$GLB,,, | Performed by: FAMILY MEDICINE

## 2017-12-11 PROCEDURE — 99999 PR PBB SHADOW E&M-EST. PATIENT-LVL III: CPT | Mod: PBBFAC,,, | Performed by: FAMILY MEDICINE

## 2017-12-11 RX ORDER — BUDESONIDE AND FORMOTEROL FUMARATE DIHYDRATE 160; 4.5 UG/1; UG/1
AEROSOL RESPIRATORY (INHALATION)
Refills: 11 | COMMUNITY
Start: 2017-11-13 | End: 2018-04-24 | Stop reason: SDUPTHER

## 2017-12-11 RX ORDER — INSULIN GLARGINE 100 [IU]/ML
40 INJECTION, SOLUTION SUBCUTANEOUS DAILY
Qty: 5 SYRINGE | Refills: 11 | Status: SHIPPED | OUTPATIENT
Start: 2017-12-11 | End: 2018-01-24 | Stop reason: SDUPTHER

## 2017-12-11 RX ORDER — MONTELUKAST SODIUM 10 MG/1
10 TABLET ORAL DAILY
COMMUNITY
Start: 2017-12-04 | End: 2018-04-24 | Stop reason: SDUPTHER

## 2017-12-11 NOTE — PROGRESS NOTES
Subjective:       Patient ID: Erendira Pretty is a male.    Chief Complaint: Multiple issues see below    HPI type 2 dm controlled;  hep c cure s/p txharvoni ;cirrhosis;utd dr gibbs    htn at [facility]  hypoth: tsh elev recently taking all meds together. Prev separate thy med   Coronary artery disease:pvd: up to date with cardiology. No chest pain, palpitations or shortness of breath. Tolerating medication.    Asthma: noc/o;  DIPAK asympt due     Pvd: lgs more tired/achiness w standing and walking.           Past Medical History   Diagnosis Date    Type 2 diabetes mellitus     Hepatitis C          Hypertension     Hyperlipidemia     Hypothyroid      s/p tx graves    Sleep apnea     Hearing loss NEC     Osteoarthritis     CAD (coronary artery disease)      40% lesion 2002;[facility]    PVD (peripheral vascular disease)     Claudication 4/9/2014    Ex-smoker     ED (erectile dysfunction)     BPH (benign prostatic hyperplasia)      Past Surgical History   Procedure Laterality Date    Knee surgery      Trigger finger release      Carpal tunnel release      Elbow bursa surgery  2010    Rectal surgery  2012    Eye surgery      Cataract extraction w/ intraocular lens  implant, bilateral  2008    Cataract extraction      Cardiac catheterization       Family History   Problem Relation Age of Onset    Heart disease Mother     Heart disease Father     Heart disease Brother        Review of Systems  Cardiovascular: no chest pain  Chest: no shortness of breath  Abd: no abd pain  Remainder review of systems negative    Objective:    cvrrr  Chest ctablat  Abd+bs softntnd no hsm no mass   Bilateral feet with normal monofilament testing and no lesions.    Assessment:     type 2 dm    htn  cirrhosis  Cad  bph  Asthma  pvd  pulm regurg hx  Plan:    Lab and follow up after in 6 months  . F/u gi as sched  . f/u Dr Gallardo when due; and aort u/s   resumjing synt separ from other meds  H.tsh one month  Other lab 6  months  Type 2 diabetes mellitus without complication, with long-term current use of insulin  -     Hemoglobin A1c; Future; Expected date: 06/09/2018    Coronary artery disease involving native coronary artery of native heart without angina pectoris    Uncomplicated asthma, unspecified asthma severity, unspecified whether persistent    Essential hypertension    Cirrhosis of liver without ascites, unspecified hepatic cirrhosis type    Acquired hypothyroidism  -     TSH; Future; Expected date: 01/11/2018    Screening for prostate cancer  -     PSA, Screening; Future; Expected date: 01/25/2018    Other specified disorders of arteries and arterioles (CODE)  -     CAR Ultrasound doppler carotid bliateral; Future; Expected date: 12/11/2017    Pulmonary valve insufficiency, unspecified etiology  -     2D echo only; Future    PVD (peripheral vascular disease)  -     Cardiology Lab Ankle Brachial Indices W Stress; Future

## 2017-12-18 ENCOUNTER — CLINICAL SUPPORT (OUTPATIENT)
Dept: CARDIOLOGY | Facility: CLINIC | Age: 72
End: 2017-12-18
Attending: FAMILY MEDICINE
Payer: MEDICARE

## 2017-12-18 DIAGNOSIS — I37.1 PULMONARY VALVE INSUFFICIENCY, UNSPECIFIED ETIOLOGY: ICD-10-CM

## 2017-12-18 DIAGNOSIS — I77.89 OTHER SPECIFIED DISORDERS OF ARTERIES AND ARTERIOLES (CODE): ICD-10-CM

## 2017-12-18 DIAGNOSIS — I73.9 PVD (PERIPHERAL VASCULAR DISEASE): ICD-10-CM

## 2017-12-18 LAB
AORTIC VALVE STENOSIS: ABNORMAL
DIASTOLIC DYSFUNCTION: NO
ESTIMATED PA SYSTOLIC PRESSURE: 35.49
INTERNAL CAROTID STENOSIS: ABNORMAL
MITRAL VALVE MOBILITY: NORMAL
RETIRED EF AND QEF - SEE NOTES: 60 (ref 55–65)
TRICUSPID VALVE REGURGITATION: ABNORMAL
VASCULAR ANKLE BRACHIAL INDEX (ABI) LEFT: 1.21 (ref 0.9–1.2)

## 2017-12-18 PROCEDURE — 93880 EXTRACRANIAL BILAT STUDY: CPT | Mod: S$GLB,,, | Performed by: INTERNAL MEDICINE

## 2017-12-18 PROCEDURE — 93924 LWR XTR VASC STDY BILAT: CPT | Mod: S$GLB,,, | Performed by: INTERNAL MEDICINE

## 2017-12-18 PROCEDURE — 93307 TTE W/O DOPPLER COMPLETE: CPT | Mod: S$GLB,,, | Performed by: NUCLEAR MEDICINE

## 2017-12-19 ENCOUNTER — TELEPHONE (OUTPATIENT)
Dept: INTERNAL MEDICINE | Facility: CLINIC | Age: 72
End: 2017-12-19

## 2017-12-19 DIAGNOSIS — I77.89 OTHER SPECIFIED DISORDERS OF ARTERIES AND ARTERIOLES (CODE): Primary | ICD-10-CM

## 2017-12-19 NOTE — TELEPHONE ENCOUNTER
----- Message from Cortes Roblero MD sent at 12/19/2017  6:34 AM CST -----  Please notify him there is some possible blockage in legs explaining symptoms. Recommend d/w dr lazo at your visit

## 2018-01-03 RX ORDER — LEVOCETIRIZINE DIHYDROCHLORIDE 5 MG/1
TABLET, FILM COATED ORAL
Qty: 30 TABLET | Refills: 11 | Status: SHIPPED | OUTPATIENT
Start: 2018-01-03 | End: 2018-10-30 | Stop reason: SINTOL

## 2018-01-04 RX ORDER — HYDROCHLOROTHIAZIDE 12.5 MG/1
TABLET ORAL
Qty: 30 TABLET | Refills: 0 | Status: SHIPPED | OUTPATIENT
Start: 2018-01-04 | End: 2018-02-16 | Stop reason: SDUPTHER

## 2018-01-10 ENCOUNTER — LAB VISIT (OUTPATIENT)
Dept: LAB | Facility: HOSPITAL | Age: 73
End: 2018-01-10
Attending: FAMILY MEDICINE
Payer: MEDICARE

## 2018-01-10 DIAGNOSIS — E03.9 ACQUIRED HYPOTHYROIDISM: ICD-10-CM

## 2018-01-10 LAB — TSH SERPL DL<=0.005 MIU/L-ACNC: 1.2 UIU/ML

## 2018-01-10 PROCEDURE — 84443 ASSAY THYROID STIM HORMONE: CPT

## 2018-01-10 PROCEDURE — 36415 COLL VENOUS BLD VENIPUNCTURE: CPT | Mod: PO

## 2018-01-24 RX ORDER — INSULIN GLARGINE 100 [IU]/ML
40 INJECTION, SOLUTION SUBCUTANEOUS DAILY
Qty: 5 SYRINGE | Refills: 6 | Status: SHIPPED | OUTPATIENT
Start: 2018-01-24 | End: 2020-02-13 | Stop reason: SDUPTHER

## 2018-02-01 ENCOUNTER — TELEPHONE (OUTPATIENT)
Dept: INTERNAL MEDICINE | Facility: CLINIC | Age: 73
End: 2018-02-01

## 2018-02-01 NOTE — TELEPHONE ENCOUNTER
----- Message from Kaur Blas MA sent at 1/24/2018  8:51 AM CST -----  Pt came into the clinic and asked if you went over his thyroid tests. Please advise

## 2018-02-02 ENCOUNTER — TELEPHONE (OUTPATIENT)
Dept: INTERNAL MEDICINE | Facility: CLINIC | Age: 73
End: 2018-02-02

## 2018-02-02 NOTE — TELEPHONE ENCOUNTER
S/w pt. Pt stated he had tests ran on the blockages in his legs and still hasnt got any results back. Please Advise

## 2018-02-02 NOTE — TELEPHONE ENCOUNTER
----- Message from Latisha yLons sent at 2/2/2018  8:50 AM CST -----  Contact: Patient   Patient returned call, Please call him at [phone].    Thanks  td

## 2018-02-06 RX ORDER — RABEPRAZOLE SODIUM 20 MG/1
TABLET, DELAYED RELEASE ORAL
Qty: 60 TABLET | Refills: 11 | Status: SHIPPED | OUTPATIENT
Start: 2018-02-06 | End: 2018-10-30 | Stop reason: SINTOL

## 2018-02-07 ENCOUNTER — OFFICE VISIT (OUTPATIENT)
Dept: CARDIOLOGY | Facility: CLINIC | Age: 73
End: 2018-02-07
Payer: MEDICARE

## 2018-02-07 VITALS
HEART RATE: 64 BPM | BODY MASS INDEX: 39.22 KG/M2 | HEIGHT: 68 IN | WEIGHT: 258.81 LBS | SYSTOLIC BLOOD PRESSURE: 120 MMHG | DIASTOLIC BLOOD PRESSURE: 76 MMHG

## 2018-02-07 DIAGNOSIS — I10 ESSENTIAL HYPERTENSION: Primary | ICD-10-CM

## 2018-02-07 DIAGNOSIS — G47.33 OBSTRUCTIVE SLEEP APNEA SYNDROME: ICD-10-CM

## 2018-02-07 DIAGNOSIS — Z79.4 TYPE 2 DIABETES MELLITUS WITHOUT COMPLICATION, WITH LONG-TERM CURRENT USE OF INSULIN: ICD-10-CM

## 2018-02-07 DIAGNOSIS — E11.9 TYPE 2 DIABETES MELLITUS WITHOUT COMPLICATION, WITH LONG-TERM CURRENT USE OF INSULIN: ICD-10-CM

## 2018-02-07 DIAGNOSIS — I25.10 CORONARY ARTERY DISEASE INVOLVING NATIVE CORONARY ARTERY OF NATIVE HEART WITHOUT ANGINA PECTORIS: ICD-10-CM

## 2018-02-07 DIAGNOSIS — I35.0 AORTIC VALVE STENOSIS, ETIOLOGY OF CARDIAC VALVE DISEASE UNSPECIFIED: ICD-10-CM

## 2018-02-07 DIAGNOSIS — I73.9 PVD (PERIPHERAL VASCULAR DISEASE): ICD-10-CM

## 2018-02-07 PROCEDURE — 1159F MED LIST DOCD IN RCRD: CPT | Mod: S$GLB,,, | Performed by: INTERNAL MEDICINE

## 2018-02-07 PROCEDURE — 99214 OFFICE O/P EST MOD 30 MIN: CPT | Mod: S$GLB,,, | Performed by: INTERNAL MEDICINE

## 2018-02-07 PROCEDURE — 3008F BODY MASS INDEX DOCD: CPT | Mod: S$GLB,,, | Performed by: INTERNAL MEDICINE

## 2018-02-07 PROCEDURE — 99999 PR PBB SHADOW E&M-EST. PATIENT-LVL III: CPT | Mod: PBBFAC,,, | Performed by: INTERNAL MEDICINE

## 2018-02-07 RX ORDER — METOPROLOL SUCCINATE 50 MG/1
TABLET, EXTENDED RELEASE ORAL
Qty: 60 TABLET | Refills: 11 | Status: ON HOLD | OUTPATIENT
Start: 2018-02-07 | End: 2018-11-08 | Stop reason: SDUPTHER

## 2018-02-07 RX ORDER — RANOLAZINE 500 MG/1
500 TABLET, EXTENDED RELEASE ORAL 2 TIMES DAILY
Qty: 60 TABLET | Refills: 11 | Status: SHIPPED | OUTPATIENT
Start: 2018-02-07 | End: 2018-04-24

## 2018-02-07 NOTE — PROGRESS NOTES
Subjective:   Patient ID:  Erendira Pretty is a 72 y.o. male who presents for follow-up of Follow-up (test results)  echo-nml lv function, moderate AS.Pt with occasional CP on exertion.    Hypertension   This is a chronic problem. The current episode started more than 1 year ago. The problem has been gradually improving since onset. The problem is controlled. Pertinent negatives include no chest pain, palpitations or shortness of breath. Past treatments include calcium channel blockers, angiotensin blockers, diuretics and beta blockers. The current treatment provides moderate improvement. Compliance problems include exercise.    Hyperlipidemia   This is a chronic problem. The current episode started more than 1 year ago. The problem is controlled. Recent lipid tests were reviewed and are variable. Pertinent negatives include no chest pain or shortness of breath. Current antihyperlipidemic treatment includes ezetimibe. The current treatment provides moderate improvement of lipids. Compliance problems include medication side effects.    Coronary Artery Disease   Presents for follow-up visit. Pertinent negatives include no chest pain, chest pressure, chest tightness, dizziness, leg swelling, muscle weakness, palpitations, shortness of breath or weight gain. Risk factors include hyperlipidemia. The symptoms have been stable. Compliance with diet is variable. Compliance with exercise is variable. Compliance with medications is good.       Review of Systems   Constitution: Negative. Negative for weight gain.   HENT: Negative.    Eyes: Negative.    Cardiovascular: Negative.  Negative for chest pain, leg swelling and palpitations.   Respiratory: Negative.  Negative for chest tightness and shortness of breath.    Endocrine: Negative.    Hematologic/Lymphatic: Negative.    Skin: Negative.    Musculoskeletal: Negative for muscle weakness.   Gastrointestinal: Negative.    Genitourinary: Negative.    Neurological: Negative.   Negative for dizziness.   Psychiatric/Behavioral: Negative.    Allergic/Immunologic: Negative.      Family History   Problem Relation Age of Onset    Heart disease Mother     Heart disease Father     Heart disease Brother     Colon cancer Neg Hx      Past Medical History:   Diagnosis Date    Aortic stenosis     Asthma     BPH (benign prostatic hyperplasia)     CAD (coronary artery disease)     40% lesion 2002;lazo    Cirrhosis     Claudication 4/9/2014    Colon polyp     ED (erectile dysfunction)     Ex-smoker     Hearing loss NEC     Hepatitis C     Cured;reji brown 2015    Hyperlipidemia     Hypertension     Hypothyroid     s/p tx graves    Osteoarthritis     PVD (peripheral vascular disease)     Sleep apnea     Type 2 diabetes mellitus      Current Outpatient Prescriptions on File Prior to Visit   Medication Sig Dispense Refill    albuterol 90 mcg/actuation inhaler Inhale 2 puffs into the lungs every 6 (six) hours. 1 Inhaler 12    amlodipine-valsartan (EXFORGE)  mg per tablet Take 1 tablet by mouth once daily. 90 tablet 3    aspirin (ECOTRIN) 81 MG EC tablet Take 81 mg by mouth once daily.      blood sugar diagnostic (BLOOD GLUCOSE TEST) Strp 1 strip by Misc.(Non-Drug; Combo Route) route 3 (three) times daily. accu-chek lux plus 100 strip 11    cycloSPORINE (RESTASIS) 0.05 % ophthalmic emulsion Place 0.4 mLs (1 drop total) into both eyes 2 (two) times daily. 60 each 11    ezetimibe (ZETIA) 10 mg tablet Take 1 tablet (10 mg total) by mouth once daily. 90 tablet 3    GLUCOSAMINE HCL/CHONDR ROSARIO A NA (OSTEO BI-FLEX ORAL) Take 1 tablet by mouth once daily.      HUMALOG KWIKPEN 100 unit/mL InPn pen INJECT 3-4 UNITS INTO THE SKIN THREE TIMES DAILY BEFORE MEALS. (Patient taking differently: INJECT 3-4 UNITS INTO THE SKIN THREE TIMES DAILY BEFORE MEALS. Currently on 12 units hs) 3 mL 11    hydroCHLOROthiazide (HYDRODIURIL) 12.5 MG Tab TAKE ONE BY MOUTH EVERY DAY. 30 tablet 0  "   insulin glargine (LANTUS SOLOSTAR) 100 unit/mL (3 mL) InPn pen Inject 40 Units into the skin once daily. 5 Syringe 6    lancets (ACCU-CHEK FASTCLIX) Misc Test 2-3 times daily 200 each 3    levocetirizine (XYZAL) 5 MG tablet TAKE ONE BY MOUTH EVERY EVENING. 30 tablet 11    metoprolol succinate (TOPROL-XL) 50 MG 24 hr tablet TAKE ONE BY MOUTH TWO TIMES A DAY. (Patient taking differently: 1/2 a tablet qday) 60 tablet 11    mometasone (NASONEX) 50 mcg/actuation nasal spray 2 sprays by Nasal route once daily. 17 g 12    montelukast (SINGULAIR) 10 mg tablet Take 10 mg by mouth once daily.       MV,CA,IRON,MIN/FA/PHYTOSTEROL (CENTRUM SPECIALIST HEART ORAL) Take 1 tablet by mouth 2 (two) times daily.      OMEGA-3 FATTY ACIDS/DHA/EPA (MEGARED PLANT-OMEGA-3 ORAL) Take 1 capsule by mouth once daily.      pen needle, diabetic (BD INSULIN PEN NEEDLE UF MINI) 31 gauge x 3/16" Ndle USE AS DIRECTED 100 each 11    RABEprazole (ACIPHEX) 20 mg tablet TAKE 1 TABLET BY MOUTH TWO TIMES A DAY 60 tablet 11    RESTASIS 0.05 % ophthalmic emulsion PLACE 1 DROP IN BOTH EYES TWO TIMES A DAY 60 each 0    saw palmetto 80 MG capsule Take 450 mg by mouth 2 (two) times daily.       SYMBICORT 160-4.5 mcg/actuation HFAA   11    SYNTHROID 137 mcg Tab tablet TAKE 2 TABLETS BY MOUTH BEFORE BREAKFAST. 60 tablet 5    sildenafil (REVATIO) 20 mg Tab Take 2 tablets one hour prior to intercourse. Max two per 24 hours 90 tablet 11    [DISCONTINUED] niacin, inositol niacinate, (NIACIN FLUSH FREE) 400 mg niacin (500 mg) Cap Take 500 mg by mouth every evening.  0     Current Facility-Administered Medications on File Prior to Visit   Medication Dose Route Frequency Provider Last Rate Last Dose    dexamethasone injection 8 mg  8 mg Intramuscular Once Alejandro Parkinson MD         Review of patient's allergies indicates:   Allergen Reactions    Iodinated contrast- oral and iv dye     Omeprazole     Prilosec [omeprazole magnesium]        Objective: "     Physical Exam   Constitutional: He is oriented to person, place, and time. He appears well-developed and well-nourished.   HENT:   Head: Normocephalic and atraumatic.   Eyes: Conjunctivae are normal. Pupils are equal, round, and reactive to light.   Neck: Normal range of motion. Neck supple.   Cardiovascular: Normal rate, regular rhythm, normal heart sounds and intact distal pulses.    Pulmonary/Chest: Effort normal and breath sounds normal.   Abdominal: Soft. Bowel sounds are normal.   Neurological: He is alert and oriented to person, place, and time.   Skin: Skin is warm and dry.   Psychiatric: He has a normal mood and affect.   Nursing note and vitals reviewed.      Assessment:     1. Essential hypertension    2. Type 2 diabetes mellitus without complication, with long-term current use of insulin    3. Obstructive sleep apnea syndrome    4. Coronary artery disease involving native coronary artery of native heart without angina pectoris    5. PVD (peripheral vascular disease)    6. Aortic valve stenosis, etiology of cardiac valve disease unspecified        Plan:     Essential hypertension    Type 2 diabetes mellitus without complication, with long-term current use of insulin    Obstructive sleep apnea syndrome    Coronary artery disease involving native coronary artery of native heart without angina pectoris    PVD (peripheral vascular disease)    Aortic valve stenosis, etiology of cardiac valve disease unspecified      Add ranexa-? Angina  Continue exforge, metoprolol, hctz-htn  Continue zetia-hlp

## 2018-02-19 RX ORDER — HYDROCHLOROTHIAZIDE 12.5 MG/1
TABLET ORAL
Qty: 30 TABLET | Refills: 0 | Status: SHIPPED | OUTPATIENT
Start: 2018-02-19 | End: 2018-03-19 | Stop reason: SDUPTHER

## 2018-03-09 DIAGNOSIS — J44.89 ASTHMA WITH COPD: ICD-10-CM

## 2018-03-09 RX ORDER — MONTELUKAST SODIUM 10 MG/1
TABLET ORAL
Qty: 30 TABLET | Refills: 11 | Status: SHIPPED | OUTPATIENT
Start: 2018-03-09 | End: 2018-12-03 | Stop reason: SDUPTHER

## 2018-03-20 RX ORDER — HYDROCHLOROTHIAZIDE 12.5 MG/1
TABLET ORAL
Qty: 30 TABLET | Refills: 0 | Status: SHIPPED | OUTPATIENT
Start: 2018-03-20 | End: 2018-04-11 | Stop reason: SDUPTHER

## 2018-04-13 RX ORDER — HYDROCHLOROTHIAZIDE 12.5 MG/1
TABLET ORAL
Qty: 30 TABLET | Refills: 0 | Status: SHIPPED | OUTPATIENT
Start: 2018-04-13 | End: 2018-05-12 | Stop reason: SDUPTHER

## 2018-04-19 DIAGNOSIS — J44.89 ASTHMA WITH COPD: ICD-10-CM

## 2018-04-20 RX ORDER — BUDESONIDE AND FORMOTEROL FUMARATE DIHYDRATE 160; 4.5 UG/1; UG/1
AEROSOL RESPIRATORY (INHALATION)
Qty: 10.2 G | Refills: 11 | Status: SHIPPED | OUTPATIENT
Start: 2018-04-20 | End: 2018-05-03 | Stop reason: SDUPTHER

## 2018-04-23 ENCOUNTER — TELEPHONE (OUTPATIENT)
Dept: UROLOGY | Facility: CLINIC | Age: 73
End: 2018-04-23

## 2018-04-23 NOTE — TELEPHONE ENCOUNTER
----- Message from Sandi May sent at 4/20/2018  3:29 PM CDT -----  Contact: pt spouse-Agata Parmar requesting an appt as soon as possible for the patient, but declined the reason for visit. Agata was advised of availability. Please adv/call [phone].[email or web address]

## 2018-04-24 ENCOUNTER — OFFICE VISIT (OUTPATIENT)
Dept: CARDIOLOGY | Facility: CLINIC | Age: 73
End: 2018-04-24
Payer: MEDICARE

## 2018-04-24 VITALS
HEART RATE: 64 BPM | DIASTOLIC BLOOD PRESSURE: 68 MMHG | WEIGHT: 244.5 LBS | BODY MASS INDEX: 37.05 KG/M2 | SYSTOLIC BLOOD PRESSURE: 134 MMHG | HEIGHT: 68 IN

## 2018-04-24 DIAGNOSIS — I73.9 PVD (PERIPHERAL VASCULAR DISEASE): ICD-10-CM

## 2018-04-24 DIAGNOSIS — I49.3 PVC (PREMATURE VENTRICULAR CONTRACTION): ICD-10-CM

## 2018-04-24 DIAGNOSIS — I10 ESSENTIAL HYPERTENSION: ICD-10-CM

## 2018-04-24 DIAGNOSIS — I25.10 CORONARY ARTERY DISEASE INVOLVING NATIVE CORONARY ARTERY OF NATIVE HEART WITHOUT ANGINA PECTORIS: ICD-10-CM

## 2018-04-24 DIAGNOSIS — I35.0 NONRHEUMATIC AORTIC VALVE STENOSIS: ICD-10-CM

## 2018-04-24 DIAGNOSIS — G47.30 SLEEP APNEA, UNSPECIFIED TYPE: ICD-10-CM

## 2018-04-24 DIAGNOSIS — E11.9 TYPE 2 DIABETES MELLITUS WITHOUT COMPLICATION, WITHOUT LONG-TERM CURRENT USE OF INSULIN: Primary | ICD-10-CM

## 2018-04-24 PROCEDURE — 99214 OFFICE O/P EST MOD 30 MIN: CPT | Mod: S$GLB,,, | Performed by: INTERNAL MEDICINE

## 2018-04-24 PROCEDURE — 3044F HG A1C LEVEL LT 7.0%: CPT | Mod: CPTII,S$GLB,, | Performed by: INTERNAL MEDICINE

## 2018-04-24 PROCEDURE — 99999 PR PBB SHADOW E&M-EST. PATIENT-LVL II: CPT | Mod: PBBFAC,,, | Performed by: INTERNAL MEDICINE

## 2018-04-24 PROCEDURE — 3078F DIAST BP <80 MM HG: CPT | Mod: CPTII,S$GLB,, | Performed by: INTERNAL MEDICINE

## 2018-04-24 PROCEDURE — 3075F SYST BP GE 130 - 139MM HG: CPT | Mod: CPTII,S$GLB,, | Performed by: INTERNAL MEDICINE

## 2018-04-24 NOTE — PROGRESS NOTES
"Subjective:   Patient ID:  Erendira Pretty is a 72 y.o. male who presents for follow-up of Follow-up (for med change; "could not take it, stopped after 2 days)  Pt did not tolerate ranexa.Patient denies CP, angina or anginal equivalent since last visit.    Hypertension   This is a chronic problem. The current episode started more than 1 year ago. The problem has been gradually improving since onset. The problem is controlled. Pertinent negatives include no chest pain, palpitations or shortness of breath. Past treatments include calcium channel blockers, angiotensin blockers, diuretics and beta blockers. The current treatment provides moderate improvement. Compliance problems include medication side effects.    Hyperlipidemia   This is a chronic problem. The current episode started more than 1 year ago. The problem is controlled. Pertinent negatives include no chest pain or shortness of breath. Current antihyperlipidemic treatment includes ezetimibe. The current treatment provides moderate improvement of lipids. Compliance problems include medication side effects.    Coronary Artery Disease   Presents for follow-up visit. Pertinent negatives include no chest pain, chest pressure, chest tightness, dizziness, leg swelling, muscle weakness, palpitations, shortness of breath or weight gain. Risk factors include hyperlipidemia. The symptoms have been stable. Compliance with diet is variable. Compliance with exercise is variable. Compliance with medications is good.       Review of Systems   Constitution: Negative. Negative for weight gain.   HENT: Negative.    Eyes: Negative.    Cardiovascular: Negative.  Negative for chest pain, leg swelling and palpitations.   Respiratory: Negative.  Negative for chest tightness and shortness of breath.    Endocrine: Negative.    Hematologic/Lymphatic: Negative.    Skin: Negative.    Musculoskeletal: Negative for muscle weakness.   Gastrointestinal: Negative.    Genitourinary: Negative.  "   Neurological: Negative.  Negative for dizziness.   Psychiatric/Behavioral: Negative.    Allergic/Immunologic: Negative.      Family History   Problem Relation Age of Onset    Heart disease Mother     Heart disease Father     Heart disease Brother     Colon cancer Neg Hx      Past Medical History:   Diagnosis Date    Aortic stenosis     Asthma     BPH (benign prostatic hyperplasia)     CAD (coronary artery disease)     40% lesion 2002;lazo    Cirrhosis     Claudication 4/9/2014    Colon polyp     ED (erectile dysfunction)     Ex-smoker     Hearing loss NEC     Hepatitis C     Cured;rjei brown 2015    Hyperlipidemia     Hypertension     Hypothyroid     s/p tx graves    Osteoarthritis     PVD (peripheral vascular disease)     Sleep apnea     Type 2 diabetes mellitus      Current Outpatient Prescriptions on File Prior to Visit   Medication Sig Dispense Refill    albuterol 90 mcg/actuation inhaler Inhale 2 puffs into the lungs every 6 (six) hours. 1 Inhaler 12    amlodipine-valsartan (EXFORGE)  mg per tablet Take 1 tablet by mouth once daily. 90 tablet 3    aspirin (ECOTRIN) 81 MG EC tablet Take 81 mg by mouth once daily.      blood sugar diagnostic (BLOOD GLUCOSE TEST) Strp 1 strip by Misc.(Non-Drug; Combo Route) route 3 (three) times daily. accu-chek lux plus 100 strip 11    cycloSPORINE (RESTASIS) 0.05 % ophthalmic emulsion Place 0.4 mLs (1 drop total) into both eyes 2 (two) times daily. 60 each 11    ezetimibe (ZETIA) 10 mg tablet Take 1 tablet (10 mg total) by mouth once daily. 90 tablet 3    GLUCOSAMINE HCL/CHONDR ROSARIO A NA (OSTEO BI-FLEX ORAL) Take 1 tablet by mouth once daily.      hydroCHLOROthiazide (HYDRODIURIL) 12.5 MG Tab TAKE 1 TABLET BY MOUTH EVERY DAY. 30 tablet 0    insulin glargine (LANTUS SOLOSTAR) 100 unit/mL (3 mL) InPn pen Inject 40 Units into the skin once daily. 5 Syringe 6    lancets (ACCU-CHEK FASTCLIX) Misc Test 2-3 times daily 200 each 3     "levocetirizine (XYZAL) 5 MG tablet TAKE ONE BY MOUTH EVERY EVENING. 30 tablet 11    metoprolol succinate (TOPROL-XL) 50 MG 24 hr tablet TAKE ONE BY MOUTH TWO TIMES A DAY. (Patient taking differently: 1/2 a tablet qday) 60 tablet 11    mometasone (NASONEX) 50 mcg/actuation nasal spray 2 sprays by Nasal route once daily. 17 g 12    montelukast (SINGULAIR) 10 mg tablet TAKE ONE TABLET BY MOUTH EVERY DAY. 30 tablet 11    MV,CA,IRON,MIN/FA/PHYTOSTEROL (CENTRUM SPECIALIST HEART ORAL) Take 1 tablet by mouth 2 (two) times daily.      OMEGA-3 FATTY ACIDS/DHA/EPA (MEGARED PLANT-OMEGA-3 ORAL) Take 1 capsule by mouth once daily.      pen needle, diabetic (BD INSULIN PEN NEEDLE UF MINI) 31 gauge x 3/16" Ndle USE AS DIRECTED 100 each 11    RABEprazole (ACIPHEX) 20 mg tablet TAKE 1 TABLET BY MOUTH TWO TIMES A DAY 60 tablet 11    RESTASIS 0.05 % ophthalmic emulsion PLACE 1 DROP IN BOTH EYES TWO TIMES A DAY 60 each 0    saw palmetto 80 MG capsule Take 450 mg by mouth 2 (two) times daily.       sildenafil (REVATIO) 20 mg Tab Take 2 tablets one hour prior to intercourse. Max two per 24 hours 90 tablet 11    SYMBICORT 160-4.5 mcg/actuation HFAA INHALE 2 PUFFS INTO THE LUNGS TWO TIMES A DAY 10.2 g 11    SYNTHROID 137 mcg Tab tablet TAKE 2 TABLETS BY MOUTH BEFORE BREAKFAST. 60 tablet 5    HUMALOG KWIKPEN 100 unit/mL InPn pen INJECT 3-4 UNITS INTO THE SKIN THREE TIMES DAILY BEFORE MEALS. (Patient taking differently: INJECT 3-4 UNITS INTO THE SKIN THREE TIMES DAILY BEFORE MEALS. Currently on 12 units hs) 3 mL 11    [DISCONTINUED] montelukast (SINGULAIR) 10 mg tablet Take 10 mg by mouth once daily.       [DISCONTINUED] ranolazine (RANEXA) 500 MG Tb12 Take 1 tablet (500 mg total) by mouth 2 (two) times daily. 60 tablet 11    [DISCONTINUED] SYMBICORT 160-4.5 mcg/actuation HFAA   11     Current Facility-Administered Medications on File Prior to Visit   Medication Dose Route Frequency Provider Last Rate Last Dose    " dexamethasone injection 8 mg  8 mg Intramuscular Once Alejanrdo Parkinson MD         Review of patient's allergies indicates:   Allergen Reactions    Iodinated contrast- oral and iv dye     Omeprazole     Prilosec [omeprazole magnesium]        Objective:     Physical Exam   Constitutional: He is oriented to person, place, and time. He appears well-developed and well-nourished.   HENT:   Head: Normocephalic and atraumatic.   Eyes: Conjunctivae are normal. Pupils are equal, round, and reactive to light.   Neck: Normal range of motion. Neck supple.   Cardiovascular: Normal rate, regular rhythm, normal heart sounds and intact distal pulses.    Pulmonary/Chest: Effort normal and breath sounds normal.   Abdominal: Soft. Bowel sounds are normal.   Neurological: He is alert and oriented to person, place, and time.   Skin: Skin is warm and dry.   Psychiatric: He has a normal mood and affect.   Nursing note and vitals reviewed.      Assessment:     1. Type 2 diabetes mellitus without complication, without long-term current use of insulin    2. Essential hypertension    3. Sleep apnea, unspecified type    4. PVC (premature ventricular contraction)    5. Coronary artery disease involving native coronary artery of native heart without angina pectoris    6. PVD (peripheral vascular disease)    7. Nonrheumatic aortic valve stenosis        Plan:     Type 2 diabetes mellitus without complication, without long-term current use of insulin    Essential hypertension    Sleep apnea, unspecified type    PVC (premature ventricular contraction)    Coronary artery disease involving native coronary artery of native heart without angina pectoris    PVD (peripheral vascular disease)    Nonrheumatic aortic valve stenosis    Continue exforge, metoprolol, hctz-htn  Continue zetia-hlp  Continue asa- cad

## 2018-04-27 ENCOUNTER — TELEPHONE (OUTPATIENT)
Dept: RADIOLOGY | Facility: HOSPITAL | Age: 73
End: 2018-04-27

## 2018-04-30 ENCOUNTER — HOSPITAL ENCOUNTER (OUTPATIENT)
Dept: RADIOLOGY | Facility: HOSPITAL | Age: 73
Discharge: HOME | End: 2018-04-30
Attending: INTERNAL MEDICINE
Payer: COMMERCIAL

## 2018-04-30 DIAGNOSIS — K74.69 OTHER CIRRHOSIS OF LIVER: ICD-10-CM

## 2018-04-30 PROCEDURE — 76705 ECHO EXAM OF ABDOMEN: CPT | Mod: 26,,, | Performed by: RADIOLOGY

## 2018-04-30 PROCEDURE — 76705 ECHO EXAM OF ABDOMEN: CPT | Mod: TC,PO

## 2018-05-03 ENCOUNTER — OFFICE VISIT (OUTPATIENT)
Dept: PULMONOLOGY | Facility: CLINIC | Age: 73
End: 2018-05-03
Payer: COMMERCIAL

## 2018-05-03 VITALS
HEIGHT: 68 IN | BODY MASS INDEX: 37.43 KG/M2 | HEART RATE: 70 BPM | DIASTOLIC BLOOD PRESSURE: 62 MMHG | OXYGEN SATURATION: 98 % | SYSTOLIC BLOOD PRESSURE: 125 MMHG | WEIGHT: 246.94 LBS | RESPIRATION RATE: 18 BRPM

## 2018-05-03 DIAGNOSIS — J44.89 ASTHMA WITH COPD: ICD-10-CM

## 2018-05-03 DIAGNOSIS — J45.31 MILD PERSISTENT ASTHMA WITH ACUTE EXACERBATION: Primary | ICD-10-CM

## 2018-05-03 PROCEDURE — 99214 OFFICE O/P EST MOD 30 MIN: CPT | Mod: 25,S$GLB,, | Performed by: INTERNAL MEDICINE

## 2018-05-03 PROCEDURE — 99999 PR PBB SHADOW E&M-EST. PATIENT-LVL III: CPT | Mod: PBBFAC,,, | Performed by: INTERNAL MEDICINE

## 2018-05-03 PROCEDURE — 96372 THER/PROPH/DIAG INJ SC/IM: CPT | Mod: S$GLB,,, | Performed by: INTERNAL MEDICINE

## 2018-05-03 PROCEDURE — 3078F DIAST BP <80 MM HG: CPT | Mod: CPTII,S$GLB,, | Performed by: INTERNAL MEDICINE

## 2018-05-03 PROCEDURE — 3074F SYST BP LT 130 MM HG: CPT | Mod: CPTII,S$GLB,, | Performed by: INTERNAL MEDICINE

## 2018-05-03 RX ORDER — BUDESONIDE AND FORMOTEROL FUMARATE DIHYDRATE 160; 4.5 UG/1; UG/1
AEROSOL RESPIRATORY (INHALATION)
Qty: 10.2 G | Refills: 11 | Status: SHIPPED | OUTPATIENT
Start: 2018-05-03 | End: 2019-01-02

## 2018-05-03 RX ORDER — TRIAMCINOLONE ACETONIDE 40 MG/ML
80 INJECTION, SUSPENSION INTRA-ARTICULAR; INTRAMUSCULAR
Status: COMPLETED | OUTPATIENT
Start: 2018-05-03 | End: 2018-05-03

## 2018-05-03 RX ORDER — AZITHROMYCIN 250 MG/1
TABLET, FILM COATED ORAL
Qty: 6 TABLET | Refills: 1 | Status: ON HOLD | OUTPATIENT
Start: 2018-05-03 | End: 2018-06-19 | Stop reason: HOSPADM

## 2018-05-03 RX ORDER — PREDNISONE 20 MG/1
TABLET ORAL
Qty: 20 TABLET | Refills: 1 | Status: ON HOLD | OUTPATIENT
Start: 2018-05-03 | End: 2018-06-19 | Stop reason: HOSPADM

## 2018-05-03 RX ADMIN — TRIAMCINOLONE ACETONIDE 80 MG: 40 INJECTION, SUSPENSION INTRA-ARTICULAR; INTRAMUSCULAR at 03:05

## 2018-05-03 NOTE — PATIENT INSTRUCTIONS
Please read Warning before using.    USE ONLY AS DIRECTED, IF SYMPTOMS PERSIST SEE YOUR DOCTOR/HEALTHCARE PROFESSIONAL. ALWAYS READ THE LABEL. IMPORTANT: NeilMed® SINUS RINSE Mixture Packets should be used with NeilMed® 240 mL (8 fl oz) NASAFLO® to achieve the best results. You may use NeilMed® packets with other irrigation devices, as long as you mix with the correct volume of water. Our recommendation is to replace Neti Pot every three months.  Step 1  Please wash your hands and rinse the device. Fill the NASAFLO® with 240 mL (8 fl oz) of lukewarm distilled, filtered or previously boiled water. Please do not use tap or faucet water to dissolve the mixture unless it has been previously boiled and cooled down. You may warm the water in a microwave, but we recommend that you warm it in increments of 5 to 10 seconds to avoid overheating, damaging the device or scalding your nasal passage. Step 2  Cut the SINUS RINSE mixture packet at the corner and pour contents into the pot. Tighten the lid on the device securely. Place one finger over the hole of the cap and shake the device gently to dissolve the mixture. Step 3  Standing in front of a sink, bend forward to your comfort level and tilt your head to one side. Keeping your mouth open, and without holding your breath, apply the tip of the device snugly against your nasal passage and ALLOW THE SOLUTION TO GENTLY FLOW until the solution starts draining from the opposite nasal passage. Use roughly half the solution in the NASAFLO® (120 mL / 4 fl oz).  It should not enter your mouth unless you are tilting your head backwards. To adjust or stop the flow, you may place your finger over the hole of the cap, and depending on the seal, you may be able to control the flow. Step 4  Blow your nose very gently, without pinching nose completely to avoid pressure on eardrums. If tolerable, sniff in gently any residual solution remaining in the nasal passage once or twice, because  this may clean out the posterior nasopharyngeal area, which is the area at the back of your nasal passage. At times, some solution will reach the back of your throat, so please spit it out.  For NASAFLO® users, to help drain any residual solution, blow your nose gently while tilting your head forward and to the same side of the nasal passage you just rinsed. Step 5  Now repeat steps 3 & 4 on your other nasal passage.  If there is any solution left over, please discard it. We recommend you make a fresh solution each time you rinse. Rinse once or twice daily OR as directed by your physician. Saline is considered safe.  The [place] FDA is recommending that common cough and cold over the counter medicines not be given to babies and toddlers, and that antihistamines should not be given to children under age 6. NeilMed® NASAFLO®: Please clean with soap & water and let air dry Please read Warning before using.    Our recommendation is to replace Neti Pot every three months.

## 2018-05-03 NOTE — PROGRESS NOTES
Subjective:    Patient ID: Erendira Pretty is a 72 y.o. male.    Chief Complaint: He     Asthma Follow-up  The patient has previously been evaluated here for asthma and presents for an asthma follow-up. The patient is currently having symptoms / an exacerbation. Current symptoms include dyspnea, non-productive cough and wheezing. Symptoms have been present since several weeks ago and have been gradually worsening. He denies chest pain and chest tightness. Associated symptoms include poor exercise tolerance.  This episode appears to have been triggered by dust and pollens. Treatments tried for the current exacerbation include short-acting inhaled beta-adrenergic agonists, which have provided some relief of symptoms. The patient has been having similar episodes for approximately 10 years.    Current Disease Severity  The patient is having daytime symptoms throughout the day. The patient is having daytime symptoms 3 to 4 times per month. The patient is using short-acting beta agonists for symptom control several times per day. She has exacerbations requiring oral systemic corticosteroids 1 times per year. Current limitations in activity from asthma: none. Number of days of school or work missed in the last month: not applicable. Number of urgent/emergent visit in last year: 0.  The patient is not using a spacer with MDIs. Her best peak flow rate is na. She is not monitoring peak flow rates at home.    Cough; Asthma with copd; and blaise on cpap    HPI  Past Medical History:   Diagnosis Date    Aortic stenosis     Asthma     BPH (benign prostatic hyperplasia)     CAD (coronary artery disease)     40% lesion 2002;[facility]    Cirrhosis     Claudication 4/9/2014    Colon polyp     ED (erectile dysfunction)     Ex-smoker     Hearing loss NEC     Hepatitis C     Cured;reji brown 2015    Hyperlipidemia     Hypertension     Hypothyroid     s/p tx graves    Osteoarthritis     PVD (peripheral vascular disease)      Sleep apnea     Type 2 diabetes mellitus      Past Surgical History:   Procedure Laterality Date    CARDIAC CATHETERIZATION      CARPAL TUNNEL RELEASE      CATARACT EXTRACTION      CATARACT EXTRACTION W/ INTRAOCULAR LENS  IMPLANT, BILATERAL  2008    COLONOSCOPY  8/2013    COLONOSCOPY N/A 5/30/2016    Procedure: COLONOSCOPY;  Surgeon: Anna Tomas MD;  Location: [facility];  Service: Endoscopy;  Laterality: N/A;    ELBOW BURSA SURGERY  2010    EYE SURGERY      KNEE SURGERY      RECTAL SURGERY  2012    TRIGGER FINGER RELEASE       Social History     Social History    Marital status:      Spouse name: YAEL    Number of children: 2    Years of education: N/A     Occupational History    Not on file.     Social History Main Topics    Smoking status: Former Smoker     Packs/day: 0.50     Years: 4.00     Quit date: 6/12/1972    Smokeless tobacco: Former User     Types: Chew     Quit date: 11/18/1976    Alcohol use No      Comment: formerly drank, quit in late 2014    Drug use: No    Sexual activity: Yes     Partners: Female     Other Topics Concern    Not on file     Social History Narrative    . Lives with spouse. 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      Review of Systems   Constitutional: Positive for fatigue. Negative for fever.   HENT: Positive for postnasal drip, rhinorrhea and congestion.    Eyes: Negative for redness and itching.   Respiratory: Positive for cough, sputum production, shortness of breath, dyspnea on extertion, use of rescue inhaler and Paroxysmal Nocturnal Dyspnea.    Cardiovascular: Negative for chest pain, palpitations and leg swelling.   Genitourinary: Negative for difficulty urinating and hematuria.   Endocrine: Negative for cold intolerance and heat intolerance.    Skin: Negative for rash.   Gastrointestinal: Negative for nausea and abdominal pain.   Neurological: Negative for dizziness, syncope, weakness and  light-headedness.   Hematological: Negative for adenopathy. Does not bruise/bleed easily.   Psychiatric/Behavioral: Negative for sleep disturbance. The patient is not nervous/anxious.        Objective:      Physical Exam   Constitutional: He is oriented to person, place, and time. He appears well-developed and well-nourished.   HENT:   Head: Normocephalic and atraumatic.   Mouth/Throat: Oropharyngeal exudate present.   Eyes: Conjunctivae are normal. Pupils are equal, round, and reactive to light.   Neck: Neck supple. No JVD present. No tracheal deviation present. No thyromegaly present.   Cardiovascular: Normal rate, regular rhythm and normal heart sounds.    No murmur heard.  Pulmonary/Chest: Effort normal. He has decreased breath sounds. He has wheezes in the right lower field and the left lower field. He has no rhonchi. He has no rales.   Abdominal: Soft. Bowel sounds are normal.   Musculoskeletal: Normal range of motion. He exhibits no edema or tenderness.   Lymphadenopathy:     He has no cervical adenopathy.   Neurological: He is alert and oriented to person, place, and time.   Skin: Skin is warm and dry.   Nursing note and vitals reviewed.    Personal Diagnostic Review  Chest x-ray: hyperinflation  [place] no recent  Office Spirometry Results:       No flowsheet data found.      Assessment:       1. Mild persistent asthma with acute exacerbation    2. Asthma with COPD        Outpatient Encounter Prescriptions as of 5/3/2018   Medication Sig Dispense Refill    amlodipine-valsartan (EXFORGE)  mg per tablet Take 1 tablet by mouth once daily. 90 tablet 3    aspirin (ECOTRIN) 81 MG EC tablet Take 81 mg by mouth once daily.      blood sugar diagnostic (BLOOD GLUCOSE TEST) Strp 1 strip by Misc.(Non-Drug; Combo Route) route 3 (three) times daily. accu-chek lux plus 100 strip 11    budesonide-formoterol 160-4.5 mcg (SYMBICORT) 160-4.5 mcg/actuation HFAA INHALE 2 PUFFS INTO THE LUNGS TWO TIMES A DAY 10.2 g 11  "   cycloSPORINE (RESTASIS) 0.05 % ophthalmic emulsion Place 0.4 mLs (1 drop total) into both eyes 2 (two) times daily. 60 each 11    ezetimibe (ZETIA) 10 mg tablet Take 1 tablet (10 mg total) by mouth once daily. 90 tablet 3    GLUCOSAMINE HCL/CHONDR ROSARIO A NA (OSTEO BI-FLEX ORAL) Take 1 tablet by mouth once daily.      HUMALOG KWIKPEN 100 unit/mL InPn pen INJECT 3-4 UNITS INTO THE SKIN THREE TIMES DAILY BEFORE MEALS. (Patient taking differently: INJECT 3-4 UNITS INTO THE SKIN THREE TIMES DAILY BEFORE MEALS. Currently on 12 units hs) 3 mL 11    hydroCHLOROthiazide (HYDRODIURIL) 12.5 MG Tab TAKE 1 TABLET BY MOUTH EVERY DAY. 30 tablet 0    insulin glargine (LANTUS SOLOSTAR) 100 unit/mL (3 mL) InPn pen Inject 40 Units into the skin once daily. 5 Syringe 6    lancets (ACCU-CHEK FASTCLIX) Misc Test 2-3 times daily 200 each 3    levocetirizine (XYZAL) 5 MG tablet TAKE ONE BY MOUTH EVERY EVENING. 30 tablet 11    metoprolol succinate (TOPROL-XL) 50 MG 24 hr tablet TAKE ONE BY MOUTH TWO TIMES A DAY. (Patient taking differently: 1/2 a tablet qday) 60 tablet 11    mometasone (NASONEX) 50 mcg/actuation nasal spray 2 sprays by Nasal route once daily. 17 g 12    montelukast (SINGULAIR) 10 mg tablet TAKE ONE TABLET BY MOUTH EVERY DAY. 30 tablet 11    MV,CA,IRON,MIN/FA/PHYTOSTEROL (CENTRUM SPECIALIST HEART ORAL) Take 1 tablet by mouth 2 (two) times daily.      OMEGA-3 FATTY ACIDS/DHA/EPA (MEGARED PLANT-OMEGA-3 ORAL) Take 1 capsule by mouth once daily.      pen needle, diabetic (BD INSULIN PEN NEEDLE UF MINI) 31 gauge x 3/16" Ndle USE AS DIRECTED 100 each 11    RABEprazole (ACIPHEX) 20 mg tablet TAKE 1 TABLET BY MOUTH TWO TIMES A DAY 60 tablet 11    RESTASIS 0.05 % ophthalmic emulsion PLACE 1 DROP IN BOTH EYES TWO TIMES A DAY 60 each 0    saw palmetto 80 MG capsule Take 450 mg by mouth 2 (two) times daily.       sildenafil (REVATIO) 20 mg Tab Take 2 tablets one hour prior to intercourse. Max two per 24 hours 90 " tablet 11    SYNTHROID 137 mcg Tab tablet TAKE 2 TABLETS BY MOUTH BEFORE BREAKFAST. 60 tablet 5    [DISCONTINUED] SYMBICORT 160-4.5 mcg/actuation HFAA INHALE 2 PUFFS INTO THE LUNGS TWO TIMES A DAY 10.2 g 11    albuterol 90 mcg/actuation inhaler Inhale 2 puffs into the lungs every 6 (six) hours. 1 Inhaler 12    azithromycin (ZITHROMAX Z-GADIEL) 250 MG tablet 500 mg on day 1 (two tablets) followed by 250 mg once daily on days 2-5 6 tablet 1    predniSONE (DELTASONE) 20 MG tablet Prednisone 60 mg/ day for 3 days, 40 mg/day for 3 days,20 mg/ day for 3 days, (1/2 tablet )10 mg a day for 3 days. 20 tablet 1     Facility-Administered Encounter Medications as of 5/3/2018   Medication Dose Route Frequency Provider Last Rate Last Dose    dexamethasone injection 8 mg  8 mg Intramuscular Once Alejandro Parkinson MD        [COMPLETED] triamcinolone acetonide injection 80 mg  80 mg Intramuscular 1 time in Clinic/HOD Jose Wren MD   80 mg at 18 1519     Orders Placed This Encounter   Procedures    X-Ray Chest PA And Lateral     Standing Status:   Future     Standing Expiration Date:   5/3/2019     Order Specific Question:   May the Radiologist modify the order per protocol to meet the clinical needs of the patient?     Answer:   Yes    Spirometry with/without bronchodilator     Standing Status:   Future     Standing Expiration Date:   5/3/2019     Plan:       Requested Prescriptions     Signed Prescriptions Disp Refills    budesonide-formoterol 160-4.5 mcg (SYMBICORT) 160-4.5 mcg/actuation HFAA 10.2 g 11     Sig: INHALE 2 PUFFS INTO THE LUNGS TWO TIMES A DAY    predniSONE (DELTASONE) 20 MG tablet 20 tablet 1     Sig: Prednisone 60 mg/ day for 3 days, 40 mg/day for 3 days,20 mg/ day for 3 days, (1/2 tablet )10 mg a day for 3 days.    azithromycin (ZITHROMAX Z-GADIEL) 250 MG tablet 6 tablet 1     Si mg on day 1 (two tablets) followed by 250 mg once daily on days 2-5     Mild persistent asthma with acute exacerbation  -      triamcinolone acetonide injection 80 mg; Inject 2 mLs (80 mg total) into the muscle one time.  -     predniSONE (DELTASONE) 20 MG tablet; Prednisone 60 mg/ day for 3 days, 40 mg/day for 3 days,20 mg/ day for 3 days, (1/2 tablet )10 mg a day for 3 days.  Dispense: 20 tablet; Refill: 1  -     azithromycin (ZITHROMAX Z-GADIEL) 250 MG tablet; 500 mg on day 1 (two tablets) followed by 250 mg once daily on days 2-5  Dispense: 6 tablet; Refill: 1    Asthma with COPD  -     budesonide-formoterol 160-4.5 mcg (SYMBICORT) 160-4.5 mcg/actuation HFAA; INHALE 2 PUFFS INTO THE LUNGS TWO TIMES A DAY  Dispense: 10.2 g; Refill: 11  -     triamcinolone acetonide injection 80 mg; Inject 2 mLs (80 mg total) into the muscle one time.  -     predniSONE (DELTASONE) 20 MG tablet; Prednisone 60 mg/ day for 3 days, 40 mg/day for 3 days,20 mg/ day for 3 days, (1/2 tablet )10 mg a day for 3 days.  Dispense: 20 tablet; Refill: 1  -     azithromycin (ZITHROMAX Z-GADIEL) 250 MG tablet; 500 mg on day 1 (two tablets) followed by 250 mg once daily on days 2-5  Dispense: 6 tablet; Refill: 1  -     Spirometry with/without bronchodilator; Future; Expected date: 11/03/2018  -     X-Ray Chest PA And Lateral; Future; Expected date: 05/03/2018           Follow-up in about 6 months (around 11/3/2018) for Review terry, Review CXR, Kenalog IM today.    MEDICAL DECISION MAKING: Moderate to high complexity.  Overall, the multiple problems listed are of moderate to high severity that may impact quality of life and activities of daily living. Side effects of medications, treatment plan as well as options and alternatives reviewed and discussed with patient. There was counseling of patient concerning these issues.    Total time spent in face to face counseling and coordination of care - 25  minutes over 50% of time was used in discussion of prognosis, risks, benefits of treatment, instructions and compliance with regimen . Discussion with other physicians or health  care providers (DME, NP, pharmacy, respiratory therapy) occurred.

## 2018-05-04 ENCOUNTER — OFFICE VISIT (OUTPATIENT)
Dept: GASTROENTEROLOGY | Facility: CLINIC | Age: 73
End: 2018-05-04
Payer: COMMERCIAL

## 2018-05-04 VITALS
BODY MASS INDEX: 37.02 KG/M2 | WEIGHT: 244.25 LBS | DIASTOLIC BLOOD PRESSURE: 80 MMHG | SYSTOLIC BLOOD PRESSURE: 124 MMHG | HEART RATE: 70 BPM | HEIGHT: 68 IN

## 2018-05-04 DIAGNOSIS — K74.60 HEPATIC CIRRHOSIS, UNSPECIFIED HEPATIC CIRRHOSIS TYPE, UNSPECIFIED WHETHER ASCITES PRESENT: Primary | ICD-10-CM

## 2018-05-04 PROCEDURE — 3074F SYST BP LT 130 MM HG: CPT | Mod: CPTII,S$GLB,, | Performed by: INTERNAL MEDICINE

## 2018-05-04 PROCEDURE — 99213 OFFICE O/P EST LOW 20 MIN: CPT | Mod: S$GLB,,, | Performed by: INTERNAL MEDICINE

## 2018-05-04 PROCEDURE — 3079F DIAST BP 80-89 MM HG: CPT | Mod: CPTII,S$GLB,, | Performed by: INTERNAL MEDICINE

## 2018-05-04 PROCEDURE — 99999 PR PBB SHADOW E&M-EST. PATIENT-LVL III: CPT | Mod: PBBFAC,,, | Performed by: INTERNAL MEDICINE

## 2018-05-04 NOTE — PROGRESS NOTES
Subjective:     Erendira Pretty is here for follow up of cirrhosis    HPI  Since Erendira Pretty's last visit there have been no significant issues.  Overall feels well.    No evidence of liver decompensation: no ascites, confusion or GI bleeding.      Review of Systems    Objective:     Physical Exam   Constitutional: He is oriented to person, place, and time. He appears well-developed and well-nourished. No distress.   HENT:   Head: Normocephalic and atraumatic.   Mouth/Throat: Oropharynx is clear and moist. No oropharyngeal exudate.   Eyes: Conjunctivae are normal. Pupils are equal, round, and reactive to light. Right eye exhibits no discharge. Left eye exhibits no discharge. No scleral icterus.   Pulmonary/Chest: Effort normal and breath sounds normal. No respiratory distress. He has no wheezes.   Abdominal: Soft. He exhibits no distension. There is no tenderness.   Musculoskeletal: He exhibits no edema.   Neurological: He is alert and oriented to person, place, and time.   Psychiatric: He has a normal mood and affect. His behavior is normal.   Vitals reviewed.      US 4/2018  Stable hepatomegaly with cirrhosis.  Complex and simple appearing hepatic and right renal cysts as above.  Overall findings have not significantly changed since the comparison exam.  No new abnormality.    MELD-Na score: 6 at 4/30/2018  7:42 AM  MELD score: 6 at 4/30/2018  7:42 AM  Calculated from:  Serum Creatinine: 0.9 mg/dL (Rounded to 1) at 4/30/2018  7:40 AM  Serum Sodium: 140 mmol/L (Rounded to 137) at 4/30/2018  7:40 AM  Total Bilirubin: 0.4 mg/dL (Rounded to 1) at 4/30/2018  7:40 AM  INR(ratio): 1 at 4/30/2018  7:42 AM  Age: 72 years    WBC   Date Value Ref Range Status   04/30/2018 5.55 3.90 - 12.70 K/uL Final     Hemoglobin   Date Value Ref Range Status   04/30/2018 14.3 14.0 - 18.0 g/dL Final     Hematocrit   Date Value Ref Range Status   04/30/2018 41.5 40.0 - 54.0 % Final     Platelets   Date Value Ref Range Status    04/30/2018 131 (L) 150 - 350 K/uL Final     BUN, Bld   Date Value Ref Range Status   04/30/2018 15 8 - 23 mg/dL Final     Creatinine   Date Value Ref Range Status   04/30/2018 0.9 0.5 - 1.4 mg/dL Final     Glucose   Date Value Ref Range Status   04/30/2018 121 (H) 70 - 110 mg/dL Final     Calcium   Date Value Ref Range Status   04/30/2018 9.2 8.7 - 10.5 mg/dL Final     Sodium   Date Value Ref Range Status   04/30/2018 140 136 - 145 mmol/L Final     Potassium   Date Value Ref Range Status   04/30/2018 4.4 3.5 - 5.1 mmol/L Final     Chloride   Date Value Ref Range Status   04/30/2018 105 95 - 110 mmol/L Final     Magnesium   Date Value Ref Range Status   02/24/2013 1.7 1.6 - 2.6 mg/dl Final     AST   Date Value Ref Range Status   04/30/2018 27 10 - 40 U/L Final     ALT   Date Value Ref Range Status   04/30/2018 24 10 - 44 U/L Final     Alkaline Phosphatase   Date Value Ref Range Status   04/30/2018 95 55 - 135 U/L Final     Total Bilirubin   Date Value Ref Range Status   04/30/2018 0.4 0.1 - 1.0 mg/dL Final     Comment:     For infants and newborns, interpretation of results should be based  on gestational age, weight and in agreement with clinical  observations.  Premature Infant recommended reference ranges:  Up to 24 hours.............<8.0 mg/dL  Up to 48 hours............<12.0 mg/dL  3-5 days..................<15.0 mg/dL  6-29 days.................<15.0 mg/dL       Albumin   Date Value Ref Range Status   04/30/2018 3.6 3.5 - 5.2 g/dL Final     INR   Date Value Ref Range Status   04/30/2018 1.0 0.8 - 1.2 Final     Comment:     Coumadin Therapy:  2.0 - 3.0 for INR for all indicators except mechanical heart valves  and antiphospholipid syndromes which should use 2.5 - 3.5.           Assessment/Plan:     1. Hepatic cirrhosis, unspecified hepatic cirrhosis type, unspecified whether ascites present      Erendira Pretty is a 72 y.o. male withFollow-up and Cirrhosis      Cirrhosis- low MELD, well compensated  -Continue  to monitor MELD labs  -Continue with HCC surveillance  -Continue variceal screening--EGD 6/2019 for variceal screening    RTC in 6 months with preclinic labs and imaging    Anna Tomas MD

## 2018-05-07 ENCOUNTER — LAB VISIT (OUTPATIENT)
Dept: LAB | Facility: HOSPITAL | Age: 73
End: 2018-05-07
Attending: UROLOGY
Payer: COMMERCIAL

## 2018-05-07 ENCOUNTER — OFFICE VISIT (OUTPATIENT)
Dept: UROLOGY | Facility: CLINIC | Age: 73
End: 2018-05-07
Payer: COMMERCIAL

## 2018-05-07 VITALS — SYSTOLIC BLOOD PRESSURE: 132 MMHG | BODY MASS INDEX: 37.4 KG/M2 | DIASTOLIC BLOOD PRESSURE: 76 MMHG | WEIGHT: 246 LBS

## 2018-05-07 DIAGNOSIS — R36.1 HEMATOSPERMIA: ICD-10-CM

## 2018-05-07 DIAGNOSIS — R36.1 HEMATOSPERMIA: Primary | ICD-10-CM

## 2018-05-07 LAB
BILIRUB SERPL-MCNC: NORMAL MG/DL
BLOOD URINE, POC: NORMAL
COLOR, POC UA: YELLOW
COMPLEXED PSA SERPL-MCNC: 0.35 NG/ML
CREAT SERPL-MCNC: 1.1 MG/DL
EST. GFR  (AFRICAN AMERICAN): >60 ML/MIN/1.73 M^2
EST. GFR  (NON AFRICAN AMERICAN): >60 ML/MIN/1.73 M^2
GLUCOSE UR QL STRIP: NORMAL
KETONES UR QL STRIP: NORMAL
LEUKOCYTE ESTERASE URINE, POC: NORMAL
NITRITE, POC UA: NORMAL
PH, POC UA: 6
PROTEIN, POC: NORMAL
SPECIFIC GRAVITY, POC UA: 1.02
UROBILINOGEN, POC UA: NORMAL

## 2018-05-07 PROCEDURE — 3078F DIAST BP <80 MM HG: CPT | Mod: CPTII,S$GLB,, | Performed by: UROLOGY

## 2018-05-07 PROCEDURE — 81002 URINALYSIS NONAUTO W/O SCOPE: CPT | Mod: S$GLB,,, | Performed by: UROLOGY

## 2018-05-07 PROCEDURE — 36415 COLL VENOUS BLD VENIPUNCTURE: CPT

## 2018-05-07 PROCEDURE — 99214 OFFICE O/P EST MOD 30 MIN: CPT | Mod: 25,S$GLB,, | Performed by: UROLOGY

## 2018-05-07 PROCEDURE — 82565 ASSAY OF CREATININE: CPT

## 2018-05-07 PROCEDURE — 99999 PR PBB SHADOW E&M-EST. PATIENT-LVL II: CPT | Mod: PBBFAC,,, | Performed by: UROLOGY

## 2018-05-07 PROCEDURE — 84153 ASSAY OF PSA TOTAL: CPT

## 2018-05-07 PROCEDURE — 3075F SYST BP GE 130 - 139MM HG: CPT | Mod: CPTII,S$GLB,, | Performed by: UROLOGY

## 2018-05-07 RX ORDER — PREDNISONE 50 MG/1
50 TABLET ORAL
Qty: 3 TABLET | Refills: 0 | Status: SHIPPED | OUTPATIENT
Start: 2018-05-07 | End: 2018-05-17

## 2018-05-07 NOTE — PROGRESS NOTES
"Chief Complaint: Hematospermia    HPI:   5/7/18: A couple weeks ago he had a lot of red blood in the semen again.  No other symptoms at all.  Occasional sildanefil.  US mentions a new complex left renal cyst.  4/4/17: The burning symptoms are gone, the fresh blood has abated, and has had a change to some brown color to the semen still.  2/14/17: Two times last week had some hematospermia.  No gross hematuria.  No LUTS.  Has a burning sensation that just started.  Is coming off some Abx for URI (azithromycin/PCN).  Finds a burning sensation in the base of the penis, under scrotum at the end of voiding and then is better but still felt.  Uses a CPAP, nocturia x2-3.  Dr. Roblero checks prostate annually.  8/15: Melissa: "70-year-old male returns to the clinic today for follow-up on his BPH.  At his last visit, we had discussed that restricting his intake of caffeine had significantly helped his daytime symptoms, but he was still having trouble at night with having to get up to urinate.  We discussed monitoring his fluid intake in the evenings even more strictly, and he stated that he would prefer to do that rather than start on medication.  Since then, he has been able to restrict his evening fluid intake even more, and is doing better.  He is not interested in starting any medication at the present time. He is not having any other urinary symptoms or problems, and his urinalysis here in the clinic today is normal."    Allergies:  Iodinated contrast- oral and iv dye; Omeprazole; and Prilosec [omeprazole magnesium]    Medications:  has a current medication list which includes the following prescription(s): albuterol, amlodipine-valsartan, aspirin, azithromycin, blood sugar diagnostic, budesonide-formoterol 160-4.5 mcg, cyclosporine, ezetimibe, glucosamine/chondr leach a sod, humalog kwikpen insulin, hydrochlorothiazide, insulin glargine, lancets, levocetirizine, metoprolol succinate, mometasone, montelukast, " multivit,iron,mins/folic acid, omega-3 fatty acids/dha/epa, pen needle, diabetic, prednisone, rabeprazole, restasis, saw palmetto, sildenafil (antihypertensive), and synthroid, and the following Facility-Administered Medications: dexamethasone.    Review of Systems:  General: No fever, chills, fatigability, or weight loss.  Skin: No rashes, itching, or changes in color or texture of skin.  Chest: Denies MARCUS, cyanosis, wheezing, cough, and sputum production.  Abdomen: Appetite fine. No weight loss. Denies diarrhea, abdominal pain, hematemesis, or blood in stool.  Musculoskeletal: No joint stiffness or swelling. Denies back pain.  : As above.  All other review of systems negative.    PMH:   has a past medical history of Aortic stenosis; Asthma; BPH (benign prostatic hyperplasia); CAD (coronary artery disease); Cirrhosis; Claudication (4/9/2014); Colon polyp; ED (erectile dysfunction); Ex-smoker; Hearing loss NEC; Hepatitis C; Hyperlipidemia; Hypertension; Hypothyroid; Osteoarthritis; PVD (peripheral vascular disease); Sleep apnea; and Type 2 diabetes mellitus.    PSH:   has a past surgical history that includes Knee surgery; Trigger finger release; Carpal tunnel release; Elbow bursa surgery (2010); Rectal surgery (2012); Eye surgery; Cataract extraction w/ intraocular lens  implant, bilateral (2008); Cataract extraction; Cardiac catheterization; Colonoscopy (8/2013); and Colonoscopy (N/A, 5/30/2016).    FamHx: family history includes Heart disease in his brother, father, and mother.    SocHx:  reports that he quit smoking about 45 years ago. He has a 2.00 pack-year smoking history. He quit smokeless tobacco use about 41 years ago. His smokeless tobacco use included Chew. He reports that he does not drink alcohol or use drugs.      Physical Exam:  Vitals:    05/07/18 0715   BP: 132/76     General: A&Ox3, no apparent distress, no deformities  Neck: No masses, normal thyroid  Lungs: normal inspiration, no use of  accessory muscles  Heart: normal pulse, no arrhythmias  Abdomen: Soft, NT, ND  Skin: The skin is warm and dry. No jaundice.  Ext: No c/c/e.  :   2/17: Test desc elodia, no abnormalities of epididymus. Penis normal, with normal penile and scrotal skin. Meatus normal. Normal rectal tone, no hemorrhoids. Prost 20 gm no nodules or masses appreciated. SV not palpable. Perineum and anus normal.    Labs/Studies:   Urinalysis performed in clinic, summary: UA normal  PSA    3/16: 0.4    Impression/Plan:   1. Likely had a resolved prostatitis previously.  But US finding of complex cyst and recurrent hematospermia warrants workup.  2. PSA for screening

## 2018-05-13 RX ORDER — HYDROCHLOROTHIAZIDE 12.5 MG/1
TABLET ORAL
Qty: 30 TABLET | Refills: 0 | Status: SHIPPED | OUTPATIENT
Start: 2018-05-13 | End: 2018-06-14 | Stop reason: SDUPTHER

## 2018-05-17 ENCOUNTER — TELEPHONE (OUTPATIENT)
Dept: RADIOLOGY | Facility: HOSPITAL | Age: 73
End: 2018-05-17

## 2018-05-21 ENCOUNTER — HOSPITAL ENCOUNTER (OUTPATIENT)
Dept: RADIOLOGY | Facility: HOSPITAL | Age: 73
Discharge: HOME | End: 2018-05-21
Attending: UROLOGY
Payer: COMMERCIAL

## 2018-05-21 DIAGNOSIS — R36.1 HEMATOSPERMIA: ICD-10-CM

## 2018-05-21 PROCEDURE — 74178 CT ABD&PLV WO CNTR FLWD CNTR: CPT | Mod: TC,PO

## 2018-05-21 PROCEDURE — 25500020 PHARM REV CODE 255: Mod: PO | Performed by: UROLOGY

## 2018-05-21 PROCEDURE — 74178 CT ABD&PLV WO CNTR FLWD CNTR: CPT | Mod: 26,,, | Performed by: RADIOLOGY

## 2018-05-21 RX ADMIN — IOHEXOL 120 ML: 350 INJECTION, SOLUTION INTRAVENOUS at 10:05

## 2018-05-31 RX ORDER — LEVOTHYROXINE SODIUM 137 UG/1
TABLET ORAL
Qty: 60 TABLET | Refills: 5 | Status: SHIPPED | OUTPATIENT
Start: 2018-05-31 | End: 2018-11-23 | Stop reason: SDUPTHER

## 2018-06-04 ENCOUNTER — LAB VISIT (OUTPATIENT)
Dept: LAB | Facility: HOSPITAL | Age: 73
End: 2018-06-04
Attending: FAMILY MEDICINE
Payer: MEDICARE

## 2018-06-04 ENCOUNTER — OFFICE VISIT (OUTPATIENT)
Dept: UROLOGY | Facility: CLINIC | Age: 73
End: 2018-06-04
Payer: MEDICARE

## 2018-06-04 VITALS
BODY MASS INDEX: 36.42 KG/M2 | WEIGHT: 240.31 LBS | HEART RATE: 73 BPM | DIASTOLIC BLOOD PRESSURE: 78 MMHG | SYSTOLIC BLOOD PRESSURE: 162 MMHG | HEIGHT: 68 IN

## 2018-06-04 DIAGNOSIS — Z12.5 SCREENING FOR PROSTATE CANCER: ICD-10-CM

## 2018-06-04 DIAGNOSIS — N36.2 URETHRAL POLYP: Primary | ICD-10-CM

## 2018-06-04 LAB
BILIRUB SERPL-MCNC: NORMAL MG/DL
BLOOD URINE, POC: NORMAL
COLOR, POC UA: NORMAL
COMPLEXED PSA SERPL-MCNC: 0.34 NG/ML
GLUCOSE UR QL STRIP: NORMAL
KETONES UR QL STRIP: NORMAL
LEUKOCYTE ESTERASE URINE, POC: NORMAL
NITRITE, POC UA: NORMAL
PH, POC UA: 5
PROTEIN, POC: NORMAL
SPECIFIC GRAVITY, POC UA: 1.01
UROBILINOGEN, POC UA: NORMAL

## 2018-06-04 PROCEDURE — 99499 UNLISTED E&M SERVICE: CPT | Mod: S$GLB,,, | Performed by: UROLOGY

## 2018-06-04 PROCEDURE — 36415 COLL VENOUS BLD VENIPUNCTURE: CPT

## 2018-06-04 PROCEDURE — 52000 CYSTOURETHROSCOPY: CPT | Mod: S$GLB,,, | Performed by: UROLOGY

## 2018-06-04 PROCEDURE — 99999 PR PBB SHADOW E&M-EST. PATIENT-LVL III: CPT | Mod: PBBFAC,,, | Performed by: UROLOGY

## 2018-06-04 PROCEDURE — 81002 URINALYSIS NONAUTO W/O SCOPE: CPT | Mod: S$GLB,,, | Performed by: UROLOGY

## 2018-06-04 PROCEDURE — 84153 ASSAY OF PSA TOTAL: CPT

## 2018-06-04 NOTE — PROGRESS NOTES
"Chief Complaint: Hematospermia    HPI:   6/4/18: Ct Urogram reassuring only some small bilateral simple cysts.  Cysto shows a couple of small polyps int he prostatic urethra proximal to the veru, and just distal to the veru at 6 o'clock there is a different polypoid mass resembling Ta TCC.  5/7/18: A couple weeks ago he had a lot of red blood in the semen again.  No other symptoms at all.  Occasional sildanefil.  US mentions a new complex left renal cyst.  4/4/17: The burning symptoms are gone, the fresh blood has abated, and has had a change to some brown color to the semen still.  2/14/17: Two times last week had some hematospermia.  No gross hematuria.  No LUTS.  Has a burning sensation that just started.  Is coming off some Abx for URI (azithromycin/PCN).  Finds a burning sensation in the base of the penis, under scrotum at the end of voiding and then is better but still felt.  Uses a CPAP, nocturia x2-3.  Dr. Roblero checks prostate annually.  8/15: Melissa: "70-year-old male returns to the clinic today for follow-up on his BPH.  At his last visit, we had discussed that restricting his intake of caffeine had significantly helped his daytime symptoms, but he was still having trouble at night with having to get up to urinate.  We discussed monitoring his fluid intake in the evenings even more strictly, and he stated that he would prefer to do that rather than start on medication.  Since then, he has been able to restrict his evening fluid intake even more, and is doing better.  He is not interested in starting any medication at the present time. He is not having any other urinary symptoms or problems, and his urinalysis here in the clinic today is normal."    Allergies:  Iodinated contrast- oral and iv dye; Omeprazole; and Prilosec [omeprazole magnesium]    Medications:  has a current medication list which includes the following prescription(s): albuterol, amlodipine-valsartan, aspirin, azithromycin, blood sugar " diagnostic, budesonide-formoterol 160-4.5 mcg, cyclosporine, ezetimibe, glucosamine/chondr leach a sod, humalog kwikpen insulin, hydrochlorothiazide, insulin glargine, lancets, levocetirizine, metoprolol succinate, mometasone, montelukast, multivit,iron,mins/folic acid, omega-3 fatty acids/dha/epa, pen needle, diabetic, prednisone, rabeprazole, restasis, saw palmetto, sildenafil, and synthroid, and the following Facility-Administered Medications: dexamethasone.    Review of Systems:  General: No fever, chills, fatigability, or weight loss.  Skin: No rashes, itching, or changes in color or texture of skin.  Chest: Denies MARCUS, cyanosis, wheezing, cough, and sputum production.  Abdomen: Appetite fine. No weight loss. Denies diarrhea, abdominal pain, hematemesis, or blood in stool.  Musculoskeletal: No joint stiffness or swelling. Denies back pain.  : As above.  All other review of systems negative.    PMH:   has a past medical history of Aortic stenosis; Asthma; BPH (benign prostatic hyperplasia); CAD (coronary artery disease); Cirrhosis; Claudication (4/9/2014); Colon polyp; ED (erectile dysfunction); Ex-smoker; Hearing loss NEC; Hepatitis C; Hyperlipidemia; Hypertension; Hypothyroid; Osteoarthritis; PVD (peripheral vascular disease); Sleep apnea; and Type 2 diabetes mellitus.    PSH:   has a past surgical history that includes Knee surgery; Trigger finger release; Carpal tunnel release; Elbow bursa surgery (2010); Rectal surgery (2012); Eye surgery; Cataract extraction w/ intraocular lens  implant, bilateral (2008); Cataract extraction; Cardiac catheterization; Colonoscopy (8/2013); and Colonoscopy (N/A, 5/30/2016).    FamHx: family history includes Heart disease in his brother, father, and mother.    SocHx:  reports that he quit smoking about 46 years ago. He has a 2.00 pack-year smoking history. He quit smokeless tobacco use about 41 years ago. His smokeless tobacco use included Chew. He reports that he does not  drink alcohol or use drugs.      Physical Exam:  Vitals:    06/04/18 0830   BP: (!) 162/78   Pulse: 73     General: A&Ox3, no apparent distress, no deformities  Neck: No masses, normal thyroid  Lungs: normal inspiration, no use of accessory muscles  Heart: normal pulse, no arrhythmias  Abdomen: Soft, NT, ND  Skin: The skin is warm and dry. No jaundice.  Ext: No c/c/e.  :   2/17: Test desc elodia, no abnormalities of epididymus. Penis normal, with normal penile and scrotal skin. Meatus normal. Normal rectal tone, no hemorrhoids. Prost 20 gm no nodules or masses appreciated. SV not palpable. Perineum and anus normal.    Labs/Studies:   Urinalysis performed in clinic, summary: UA normal  PSA    6/18: 0.35    3/16: 0.4    Procedure: Diagnostic Cystoscopy    Procedure in Detail: After proper consents were obtained, the patient was prepped and draped in normal sterile fashion for diagnostic cystoscopy. 5 ml of lidocaine jelly was instilled in the urethra. The flexible cystoscope was then introduced into the urethra, and advanced into the bladder under direct vision. The urethral mucosa appeared normal, and no strictures were noted. The sphincter appeared to be normal, and the veru montanum was unremarkable. The prostatic mucosa and the lateral lobes of the prostate were normal except as below. The bladder neck was normal. Inspection of the interior of the bladder was then carried out. The trigone was unremarkable, with no mucosal lesions. The ureteral orifices were normal in position and configuration. Systematic inspection of the mucosa of the bladder it was then carried out, rotating the cystoscope so that all areas of the left and right lateral walls, the dome of the bladder, and the posterior wall were all visualized. The cystoscope was then advanced further into the bladder, and maximum deflection of the scope was performed so that the bladder neck could be inspected. No mucosal lesions were noted there. The  cystoscope was then removed, and the procedure terminated.     Findings: a couple of small polyps int he prostatic urethra proximal to the veru, and just distal to the veru at 6 o'clock there is a different polypoid mass resembling Ta TCC.      Impression/Plan:   1. Unusual finding at veru, warrants biopsy.  Risks/benefits reviewed will schedule.  2. Low risk of prostate cancer

## 2018-06-07 ENCOUNTER — HOSPITAL ENCOUNTER (OUTPATIENT)
Dept: RADIOLOGY | Facility: HOSPITAL | Age: 73
Discharge: HOME | End: 2018-06-07
Attending: INTERNAL MEDICINE
Payer: MEDICARE

## 2018-06-07 DIAGNOSIS — J45.20 MILD INTERMITTENT ASTHMA WITHOUT COMPLICATION: ICD-10-CM

## 2018-06-07 PROCEDURE — 71046 X-RAY EXAM CHEST 2 VIEWS: CPT | Mod: 26,,, | Performed by: RADIOLOGY

## 2018-06-07 PROCEDURE — 71046 X-RAY EXAM CHEST 2 VIEWS: CPT | Mod: TC

## 2018-06-07 PROCEDURE — 93000 ELECTROCARDIOGRAM COMPLETE: CPT | Mod: S$GLB,,, | Performed by: INTERNAL MEDICINE

## 2018-06-17 RX ORDER — HYDROCHLOROTHIAZIDE 12.5 MG/1
TABLET ORAL
Qty: 30 TABLET | Refills: 0 | Status: SHIPPED | OUTPATIENT
Start: 2018-06-17 | End: 2018-07-13 | Stop reason: SDUPTHER

## 2018-06-18 ENCOUNTER — PATIENT OUTREACH (OUTPATIENT)
Dept: ADMINISTRATIVE | Facility: HOSPITAL | Age: 73
End: 2018-06-18

## 2018-06-18 ENCOUNTER — TELEPHONE (OUTPATIENT)
Dept: UROLOGY | Facility: CLINIC | Age: 73
End: 2018-06-18

## 2018-06-18 ENCOUNTER — ANESTHESIA EVENT (OUTPATIENT)
Dept: SURGERY | Facility: HOSPITAL | Age: 73
End: 2018-06-18
Payer: MEDICARE

## 2018-06-18 NOTE — PRE ADMISSION SCREENING
Pre op instructions reviewed with patient per phone:    To confirm, Your surgeon has instructed you:  Surgery is scheduled 06/19/18at per MD.      [facility] by per MD.         INSTRUCTIONS IMPORTANT!!!  ¨ Do not eat, drink, or smoke after 12 midnight-including water. OK to brush teeth, no gum, candy or mints!    ¨ Take only these medicines with a small swallow of water-morning of surgery.  Amlodipine-Valsartan, Metoprolol, Synthroid, use Symbicort and Albuterol inhalers          ____  Do not wear makeup, including mascara.  ____  No powder, lotions or creams to surgical area.  ____  Please remove all jewelry, including piercings and leave at home.  ____  No money or valuables needed. Please leave at home.  ____  Please bring identification and insurance information to hospital.  ____  If going home the same day, arrange for a ride home. You will not be able to   drive if Anesthesia was used.  ____  Children, under 12 years old, must remain in the waiting room with an adult.  They are not allowed in patient areas.  ____  Wear loose fitting clothing. Allow for dressings, bandages.  ____  Stop Aspirin, Ibuprofen, Motrin and Aleve at least 5-7 days before surgery, unless otherwise instructed by your doctor, or the nurse.   You MAY use Tylenol/acetaminophen until day of surgery.  ____  If you take diabetic medication, do not take am of surgery unless instructed by   Doctor.  ____ Stop taking any Fish Oil supplement or any Vitamins that contain Vitamin E at least 5 days prior to surgery.          Bathing Instructions-- The night before surgery and the morning prior to coming to the hospital:   -Do not shave the surgical area.   -Shower and wash your hair and body as usual with anti-bacterial  soap and shampoo.   -Rinse your hair and body completely.   -Use one packet of hibiclens to wash the surgical site (using your hand) gently for 5 minutes.  Do not scrub  you skin too hard.   -Do not use hibiclens on your head, face, or genitals.   -Do not wash with anti-bacterial soap after you use the hibiclens.   -Rinse your body thoroughly.   -Dry with clean, soft towel.  Do not use lotion, cream, deodorant, or powders on   the surgical site.    Use antibacterial soap in place of hibiclens if your surgery is on the head, face or genitals.         Surgical Site Infection    Prevention of surgical site infections:     -Keep incisions clean and dry.   -Do not soak/submerge incisions in water until completely healed.   -Do not apply lotions, powders, creams, or deodorants to site.   -Always make sure hands are cleaned with antibacterial soap/ alcohol-based   prior to touching the surgical site.  (This includes doctors, nurses, staff, and yourself.)    Signs and symptoms:   -Redness and pain around the area where you had surgery   -Drainage of cloudy fluid from your surgical wound   -Fever over 100.4  I have read or had read and explained to me, and understand the above information.

## 2018-06-19 ENCOUNTER — HOSPITAL ENCOUNTER (OUTPATIENT)
Facility: HOSPITAL | Age: 73
Discharge: HOME | End: 2018-06-19
Attending: UROLOGY | Admitting: UROLOGY
Payer: MEDICARE

## 2018-06-19 ENCOUNTER — ANESTHESIA (OUTPATIENT)
Dept: SURGERY | Facility: HOSPITAL | Age: 73
End: 2018-06-19
Payer: MEDICARE

## 2018-06-19 ENCOUNTER — SURGERY (OUTPATIENT)
Age: 73
End: 2018-06-19

## 2018-06-19 ENCOUNTER — TELEPHONE (OUTPATIENT)
Dept: UROLOGY | Facility: CLINIC | Age: 73
End: 2018-06-19

## 2018-06-19 DIAGNOSIS — N36.2 URETHRAL POLYP: ICD-10-CM

## 2018-06-19 LAB — POCT GLUCOSE: 114 MG/DL (ref 70–110)

## 2018-06-19 PROCEDURE — 63600175 PHARM REV CODE 636 W HCPCS: Performed by: NURSE ANESTHETIST, CERTIFIED REGISTERED

## 2018-06-19 PROCEDURE — 36000707: Performed by: UROLOGY

## 2018-06-19 PROCEDURE — 37000008 HC ANESTHESIA 1ST 15 MINUTES: Performed by: UROLOGY

## 2018-06-19 PROCEDURE — 63600175 PHARM REV CODE 636 W HCPCS: Performed by: UROLOGY

## 2018-06-19 PROCEDURE — 36000706: Performed by: UROLOGY

## 2018-06-19 PROCEDURE — 71000033 HC RECOVERY, INTIAL HOUR: Performed by: UROLOGY

## 2018-06-19 PROCEDURE — 88305 TISSUE EXAM BY PATHOLOGIST: CPT | Mod: 26,,, | Performed by: PATHOLOGY

## 2018-06-19 PROCEDURE — 25000003 PHARM REV CODE 250: Performed by: NURSE ANESTHETIST, CERTIFIED REGISTERED

## 2018-06-19 PROCEDURE — 88305 TISSUE EXAM BY PATHOLOGIST: CPT | Performed by: PATHOLOGY

## 2018-06-19 PROCEDURE — 25000003 PHARM REV CODE 250: Performed by: ANESTHESIOLOGY

## 2018-06-19 PROCEDURE — 37000009 HC ANESTHESIA EA ADD 15 MINS: Performed by: UROLOGY

## 2018-06-19 PROCEDURE — 52601 PROSTATECTOMY (TURP): CPT | Mod: ,,, | Performed by: UROLOGY

## 2018-06-19 PROCEDURE — 71000015 HC POSTOP RECOV 1ST HR: Performed by: UROLOGY

## 2018-06-19 RX ORDER — SODIUM CHLORIDE 0.9 % (FLUSH) 0.9 %
3 SYRINGE (ML) INJECTION EVERY 8 HOURS
Status: DISCONTINUED | OUTPATIENT
Start: 2018-06-19 | End: 2018-06-19 | Stop reason: HOSPADM

## 2018-06-19 RX ORDER — OXYCODONE AND ACETAMINOPHEN 5; 325 MG/1; MG/1
1-2 TABLET ORAL EVERY 4 HOURS PRN
Qty: 30 TABLET | Refills: 0 | Status: ON HOLD | OUTPATIENT
Start: 2018-06-19 | End: 2018-11-08 | Stop reason: HOSPADM

## 2018-06-19 RX ORDER — SODIUM CHLORIDE 0.9 % (FLUSH) 0.9 %
3 SYRINGE (ML) INJECTION
Status: DISCONTINUED | OUTPATIENT
Start: 2018-06-19 | End: 2018-06-19 | Stop reason: HOSPADM

## 2018-06-19 RX ORDER — ROCURONIUM BROMIDE 10 MG/ML
INJECTION, SOLUTION INTRAVENOUS
Status: DISCONTINUED | OUTPATIENT
Start: 2018-06-19 | End: 2018-06-19

## 2018-06-19 RX ORDER — ONDANSETRON 2 MG/ML
INJECTION INTRAMUSCULAR; INTRAVENOUS
Status: DISCONTINUED | OUTPATIENT
Start: 2018-06-19 | End: 2018-06-19

## 2018-06-19 RX ORDER — PROPOFOL 10 MG/ML
VIAL (ML) INTRAVENOUS
Status: DISCONTINUED | OUTPATIENT
Start: 2018-06-19 | End: 2018-06-19

## 2018-06-19 RX ORDER — CEFAZOLIN SODIUM 2 G/50ML
2 SOLUTION INTRAVENOUS
Status: DISCONTINUED | OUTPATIENT
Start: 2018-06-19 | End: 2018-06-19 | Stop reason: HOSPADM

## 2018-06-19 RX ORDER — FENTANYL CITRATE 50 UG/ML
INJECTION, SOLUTION INTRAMUSCULAR; INTRAVENOUS
Status: DISCONTINUED | OUTPATIENT
Start: 2018-06-19 | End: 2018-06-19

## 2018-06-19 RX ORDER — DOCUSATE SODIUM 100 MG/1
100 CAPSULE, LIQUID FILLED ORAL 2 TIMES DAILY
Qty: 60 CAPSULE | Refills: 0 | Status: SHIPPED | OUTPATIENT
Start: 2018-06-19 | End: 2020-03-09

## 2018-06-19 RX ORDER — HYDROCODONE BITARTRATE AND ACETAMINOPHEN 5; 325 MG/1; MG/1
1 TABLET ORAL EVERY 4 HOURS PRN
Status: DISCONTINUED | OUTPATIENT
Start: 2018-06-19 | End: 2018-06-19 | Stop reason: HOSPADM

## 2018-06-19 RX ORDER — FENTANYL CITRATE 50 UG/ML
25 INJECTION, SOLUTION INTRAMUSCULAR; INTRAVENOUS EVERY 5 MIN PRN
Status: DISCONTINUED | OUTPATIENT
Start: 2018-06-19 | End: 2018-06-19 | Stop reason: HOSPADM

## 2018-06-19 RX ORDER — HYDROCODONE BITARTRATE AND ACETAMINOPHEN 10; 325 MG/1; MG/1
1 TABLET ORAL EVERY 4 HOURS PRN
Status: DISCONTINUED | OUTPATIENT
Start: 2018-06-19 | End: 2018-06-19 | Stop reason: HOSPADM

## 2018-06-19 RX ORDER — NEOSTIGMINE METHYLSULFATE 1 MG/ML
INJECTION, SOLUTION INTRAVENOUS
Status: DISCONTINUED | OUTPATIENT
Start: 2018-06-19 | End: 2018-06-19

## 2018-06-19 RX ORDER — ONDANSETRON 8 MG/1
8 TABLET, ORALLY DISINTEGRATING ORAL EVERY 8 HOURS PRN
Status: DISCONTINUED | OUTPATIENT
Start: 2018-06-19 | End: 2018-06-19 | Stop reason: HOSPADM

## 2018-06-19 RX ORDER — SODIUM CHLORIDE, SODIUM LACTATE, POTASSIUM CHLORIDE, CALCIUM CHLORIDE 600; 310; 30; 20 MG/100ML; MG/100ML; MG/100ML; MG/100ML
INJECTION, SOLUTION INTRAVENOUS CONTINUOUS
Status: DISCONTINUED | OUTPATIENT
Start: 2018-06-19 | End: 2018-06-19 | Stop reason: HOSPADM

## 2018-06-19 RX ORDER — MEPERIDINE HYDROCHLORIDE 50 MG/ML
12.5 INJECTION INTRAMUSCULAR; INTRAVENOUS; SUBCUTANEOUS ONCE AS NEEDED
Status: DISCONTINUED | OUTPATIENT
Start: 2018-06-19 | End: 2018-06-19 | Stop reason: HOSPADM

## 2018-06-19 RX ORDER — MIDAZOLAM HYDROCHLORIDE 1 MG/ML
INJECTION, SOLUTION INTRAMUSCULAR; INTRAVENOUS
Status: DISCONTINUED | OUTPATIENT
Start: 2018-06-19 | End: 2018-06-19

## 2018-06-19 RX ORDER — LIDOCAINE HCL/PF 100 MG/5ML
SYRINGE (ML) INTRAVENOUS
Status: DISCONTINUED | OUTPATIENT
Start: 2018-06-19 | End: 2018-06-19

## 2018-06-19 RX ORDER — CIPROFLOXACIN 500 MG/1
500 TABLET ORAL EVERY 12 HOURS
Qty: 6 TABLET | Refills: 0 | Status: SHIPPED | OUTPATIENT
Start: 2018-06-19 | End: 2018-06-22 | Stop reason: ALTCHOICE

## 2018-06-19 RX ORDER — ACETAMINOPHEN 10 MG/ML
1000 INJECTION, SOLUTION INTRAVENOUS ONCE
Status: DISCONTINUED | OUTPATIENT
Start: 2018-06-19 | End: 2018-06-19 | Stop reason: HOSPADM

## 2018-06-19 RX ORDER — GLYCOPYRROLATE 0.2 MG/ML
INJECTION INTRAMUSCULAR; INTRAVENOUS
Status: DISCONTINUED | OUTPATIENT
Start: 2018-06-19 | End: 2018-06-19

## 2018-06-19 RX ADMIN — ONDANSETRON 4 MG: 2 INJECTION, SOLUTION INTRAMUSCULAR; INTRAVENOUS at 07:06

## 2018-06-19 RX ADMIN — CEFAZOLIN SODIUM 2 G: 2 SOLUTION INTRAVENOUS at 07:06

## 2018-06-19 RX ADMIN — ROCURONIUM BROMIDE 50 MG: 10 INJECTION, SOLUTION INTRAVENOUS at 07:06

## 2018-06-19 RX ADMIN — SODIUM CHLORIDE, SODIUM LACTATE, POTASSIUM CHLORIDE, AND CALCIUM CHLORIDE: 600; 310; 30; 20 INJECTION, SOLUTION INTRAVENOUS at 07:06

## 2018-06-19 RX ADMIN — PROPOFOL 100 MG: 10 INJECTION, EMULSION INTRAVENOUS at 07:06

## 2018-06-19 RX ADMIN — FENTANYL CITRATE 100 MCG: 50 INJECTION, SOLUTION INTRAMUSCULAR; INTRAVENOUS at 07:06

## 2018-06-19 RX ADMIN — LIDOCAINE HYDROCHLORIDE 80 MG: 20 INJECTION, SOLUTION INTRAVENOUS at 07:06

## 2018-06-19 RX ADMIN — MIDAZOLAM 2 MG: 1 INJECTION INTRAMUSCULAR; INTRAVENOUS at 07:06

## 2018-06-19 RX ADMIN — NEOSTIGMINE METHYLSULFATE 5 MG: 1 INJECTION INTRAVENOUS at 08:06

## 2018-06-19 RX ADMIN — ROBINUL 0.8 MG: 0.2 INJECTION INTRAMUSCULAR; INTRAVENOUS at 08:06

## 2018-06-19 NOTE — ANESTHESIA RELEASE NOTE
"Anesthesia Release from PACU Note    Patient: Erendira rPetty    Procedure(s) Performed: Procedure(s) (LRB):  TURP, WITHOUT USING LASER (N/A)    Anesthesia type: general    Post pain: Adequate analgesia    Post assessment: no apparent anesthetic complications    Last Vitals:   Visit Vitals  BP (!) 141/74 (BP Location: Right arm, Patient Position: Sitting)   Pulse 74   Temp 36.9 °C (98.4 °F) (Tympanic)   Resp 20   Ht 5' 8" (1.727 m)   Wt 108.4 kg (238 lb 15.7 oz)   SpO2 97%   BMI 36.34 kg/m²       Post vital signs: stable    Level of consciousness: awake    Nausea/Vomiting: no nausea/no vomiting    Complications: none    Airway Patency: patent    Respiratory: unassisted    Cardiovascular: stable and blood pressure at baseline    Hydration: euvolemic  "

## 2018-06-19 NOTE — OP NOTE
Date of Procedure: 06/19/2018    PREOP DIAGNOSIS:  Prostatic urethral polyps.     POSTOP DIAGNOSIS:  Same    PROCEDURES:      1. Electrosurgical TURP    SURGEON:   Bony Cordova IV, M.D.    Assistants: None    Specimen: None    Prosthetics, Devices, Grafts: None    ANES: General endotracheal.       FINDINGS:  The patient had suspicious polyps in the prostatic urethra on either side of the veru.  Electrosurgical TURP was effected at the floor of the prostatic urethra to resect these.  The veru was taken but no cautery applied at that location. .         PROCEDURE IN DETAIL:  After proper consents were obtained, the patient  brought to the Operating Room where he was prepped and draped in normal sterile fashion and provided general endotracheal anesthesia in dorsal  lithotomy position.  The Erbe resectoscope was assembled and introduced per urethra and the boundaries of resection were appreciated.  The lesions at the posterior prostatic urethra were resected with a margin to the bladder neck and 5-7 o'clock positions.  Cautery was used but not at the veru.  The prostate chips were retrieved from the bladder.  The resectoscope was withdrawn and a good flow of urine was observed at the penis.  A 20-Setswana Armenta catheter was then placed in the bladder with 10 mL sterile water in its balloon.  This was sent to gravity drainage and was irrigated and seen to be clear.  The patient tolerated this well and there were no complications. He was then awakened and transferred to Recovery in stable condition.                                                                                    BLOOD LOSS:  None.                                                                                                                                     BLOOD GIVEN:  None.                                                                                                                                    URINE OUTPUT:  None.                                                                                                                                    DRAINS:  20-Occitan Armenta catheter.                                                                                                                     DISPOSITION:  Home.                                                                                                                         PLAN:  The patient was provided with pain medicines, stool softeners, prophylactic antibiotics.  He is provided with instructions for care at home and will return in 3 days for removal of his Armenta catheter.   He will call if any difficulties arise.

## 2018-06-19 NOTE — TRANSFER OF CARE
"Anesthesia Transfer of Care Note    Patient: Erendira Pretty    Procedure(s) Performed: Procedure(s) (LRB):  TURP, WITHOUT USING LASER (N/A)    Patient location: PACU    Anesthesia Type: general    Transport from OR: Transported from OR on room air with adequate spontaneous ventilation    Post pain: adequate analgesia    Post assessment: no apparent anesthetic complications    Post vital signs: stable    Level of consciousness: awake    Nausea/Vomiting: no nausea/vomiting    Complications: none    Transfer of care protocol was followed      Last vitals:   Visit Vitals  BP (!) 141/74 (BP Location: Right arm, Patient Position: Sitting)   Pulse 74   Temp 36.9 °C (98.4 °F) (Tympanic)   Resp 20   Ht 5' 8" (1.727 m)   Wt 108.4 kg (238 lb 15.7 oz)   SpO2 97%   BMI 36.34 kg/m²     "

## 2018-06-19 NOTE — ANESTHESIA PREPROCEDURE EVALUATION
06/18/2018  Erendira Pretty is a 73 y.o., male.    Anesthesia Evaluation    I have reviewed the Patient Summary Reports.    I have reviewed the Nursing Notes.      Review of Systems  Anesthesia Hx:  No problems with previous Anesthesia  Denies Family Hx of Anesthesia complications.   Denies Personal Hx of Anesthesia complications.   Social:  Former Smoker, Alcohol Use    Hematology/Oncology:  Hematology Normal   Oncology Normal     EENT/Dental:EENT/Dental Normal   Cardiovascular:   Hypertension CAD   PVD hyperlipidemia Claudication.  PVCs    CONCLUSIONS     1 - Concentric hypertrophy.     2 - No wall motion abnormalities.     3 - Normal left ventricular systolic function (EF 60-65%).     4 - Normal left ventricular diastolic function.     5 - Normal right ventricular systolic function .     6 - Moderate aortic stenosis, RADHA = 1.35 cm2, AVAi = 0.58 cm2/m2, peak velocity = 3.1 m/s, mean gradient = 24 mmHg.     7 - Mild tricuspid regurgitation.     8 - Trivial to mild pulmonic regurgitation.     9 - The estimated PA systolic pressure is 35 mmHg.     10 - Incidentally there is evidence of a 4.0 cm liver cyst.   Pulmonary:   COPD Asthma Sleep Apnea, CPAP Hx Asbestos exposure.    Renal/:   Erectile dysfunction   Hepatic/GI:   Liver Disease, Hepatitis, C Colon Polyp, esophageal varices.    Musculoskeletal:   Arthritis     Neurological:  Neurology Normal    Endocrine:   Diabetes, type 2 Hypothyroidism        Physical Exam  General:  Obesity       Chest/Lungs:  Chest/Lungs Findings: Clear to auscultation, Normal Respiratory Rate     Heart/Vascular:  Heart Findings: Rate: Normal  Rhythm: Regular Rhythm        Mental Status:  Mental Status Findings:  Cooperative, Alert and Oriented         Anesthesia Plan  Type of Anesthesia, risks & benefits discussed:  Anesthesia Type:  general  Patient's Preference:   Intra-op  Monitoring Plan: standard ASA monitors  Intra-op Monitoring Plan Comments:   Post Op Pain Control Plan: IV/PO Opioids PRN  Post Op Pain Control Plan Comments:   Induction:   IV  Beta Blocker:  Patient is on a Beta-Blocker and has received one dose within the past 24 hours (No further documentation required).       Informed Consent: Patient understands risks and agrees with Anesthesia plan.  Questions answered. Anesthesia consent signed with patient.  ASA Score: 3     Day of Surgery Review of History & Physical: I have interviewed and examined the patient. I have reviewed the patient's H&P dated:            Ready For Surgery From Anesthesia Perspective.

## 2018-06-19 NOTE — PLAN OF CARE
Pt resting on bed, denies pain just c/o discomfort from hernandez feeling as though he needs to void. VSS. Will cont to monitor. See flow sheet for detailed assessment.

## 2018-06-19 NOTE — DISCHARGE SUMMARY
Date of Discharge: 06/19/2018     Principle Diagnosis: Urethral polyp    Secondary Diagnosis:  has a past medical history of Aortic stenosis; Asthma; BPH (benign prostatic hyperplasia); CAD (coronary artery disease); Cirrhosis; Claudication (4/9/2014); Colon polyp; ED (erectile dysfunction); Encounter for blood transfusion; Ex-smoker; Hearing loss NEC; Hepatitis C; Hyperlipidemia; Hypertension; Hypothyroid; DELONTE on CPAP; Osteoarthritis; PVD (peripheral vascular disease); and Type 2 diabetes mellitus.    History of Present Illness: Pt was worked up in clinic and today's procedure was scheduled.  Please see H&P for full details.    Hospital Course: Pt presented on the day of surgery and after proper consents were obtained he was brought to the OR where his procedure was performed without difficulty.    Discharge Disposition: Home    Followup Plan:  1. Pt is provided with medications for pain and others as indicated.  2. RTC in 2 weeks

## 2018-06-19 NOTE — TELEPHONE ENCOUNTER
----- Message from Cooper Cordova IV, MD sent at 6/19/2018  8:25 AM CDT -----  RTC Thurs nurse visit kerri hernandez then 2 wks with me

## 2018-06-19 NOTE — DISCHARGE INSTRUCTIONS
General Information:    1. Do not drink alcoholic beverages including beer for 24 hours or as long as you are on pain medication..  2. Do not drive a motor vehicle, operate machinery or power tools, or signs legal papers for 24 hours or as long as you are on pain medication.   3. You may experience light-headedness, dizziness, and sleepiness following surgery. Please do not stay alone. A responsible adult should be with you for this 24 hour period.  4. Go home and rest.  5. Progress slowly to a normal diet unless instructed.  Otherwise, begin with liquids such as soft drinks, then soup and crackers working up to solid foods. Drink plenty of nonalcoholic fluids.  6. Certain anesthetics and pain medications produce nausea and vomiting in certain individuals. If nausea becomes a problem at home, call you doctor.  7. A nurse will be calling you sometime after surgery. Do not be alarmed. This is our way of finding out how you are doing.  8. Several times every hour while you are awake, take 2-3 deep breaths and cough. If you had stomach surgery hold a pillow or rolled towel firmly against your stomach before you cough. This will help with any pain the cough might cause.  9. Several times every hour while you are awake, pump and flex your feet 5-6 times and do foot circles. This will help prevent blood clots.  10. Call your doctor for severe pain, bleeding, fever, or signs or symptoms of infection (pain, swelling, redness, foul odor, drainage).  11. You can contact your doctor anytime by [phone] for the [facility] (at [facility]) or [phone] for the [facility] on [facility].   [email or web address] is another way to contact your doctor if you are an active participant online with [facility].  12. Continue Nozin provided at discharge twice daily for 7 days or until the incision is healed.  See pamphlet or box for instructions.

## 2018-06-20 NOTE — ANESTHESIA POSTPROCEDURE EVALUATION
"Anesthesia Post Evaluation    Patient: Erendira Pretty    Procedure(s) Performed: Procedure(s) (LRB):  TURP, WITHOUT USING LASER (N/A)    Final Anesthesia Type: general  Patient location during evaluation: PACU  Patient participation: Yes- Able to Participate  Level of consciousness: awake and alert and oriented  Post-procedure vital signs: reviewed and stable  Pain management: adequate  Airway patency: patent  PONV status at discharge: No PONV  Anesthetic complications: no      Cardiovascular status: hemodynamically stable  Respiratory status: unassisted, room air and spontaneous ventilation  Hydration status: euvolemic  Follow-up not needed.        Visit Vitals  BP (!) 149/78 (BP Location: Left arm, Patient Position: Lying)   Pulse 62   Temp 37.2 °C (99 °F) (Oral)   Resp 13   Ht 5' 8" (1.727 m)   Wt 108.4 kg (238 lb 15.7 oz)   SpO2 96%   BMI 36.34 kg/m²       Pain/Natalie Score: Pain Assessment Performed: Yes (6/19/2018  9:00 AM)  Presence of Pain: denies (6/19/2018  9:00 AM)  Natalie Score: 10 (6/19/2018  8:45 AM)      "

## 2018-06-21 ENCOUNTER — CLINICAL SUPPORT (OUTPATIENT)
Dept: UROLOGY | Facility: CLINIC | Age: 73
End: 2018-06-21
Payer: MEDICARE

## 2018-06-21 NOTE — PROGRESS NOTES
Pt came to clinic for cath removal per Dr Cordova's orders.  Pt had a 20f indwelling hernandez cath with a 10cc balloon.  I deflated the 10cc balloon.  Noted minimal amount of blood from penis.  Pt tolerated it well.  Advised pt to call our office or go to the ER if he hasn't voided in 4-6 hours.  Pt verbalized understanding.

## 2018-06-22 ENCOUNTER — TELEPHONE (OUTPATIENT)
Dept: UROLOGY | Facility: CLINIC | Age: 73
End: 2018-06-22

## 2018-06-22 ENCOUNTER — HOSPITAL ENCOUNTER (EMERGENCY)
Facility: HOSPITAL | Age: 73
Discharge: HOME | End: 2018-06-22
Attending: EMERGENCY MEDICINE
Payer: MEDICARE

## 2018-06-22 VITALS
BODY MASS INDEX: 36.75 KG/M2 | OXYGEN SATURATION: 97 % | RESPIRATION RATE: 18 BRPM | TEMPERATURE: 99 F | HEART RATE: 68 BPM | HEIGHT: 68 IN | WEIGHT: 242.5 LBS | SYSTOLIC BLOOD PRESSURE: 165 MMHG | DIASTOLIC BLOOD PRESSURE: 78 MMHG

## 2018-06-22 VITALS
DIASTOLIC BLOOD PRESSURE: 78 MMHG | BODY MASS INDEX: 36.22 KG/M2 | WEIGHT: 239 LBS | SYSTOLIC BLOOD PRESSURE: 149 MMHG | RESPIRATION RATE: 13 BRPM | HEART RATE: 62 BPM | OXYGEN SATURATION: 96 % | HEIGHT: 68 IN | TEMPERATURE: 99 F

## 2018-06-22 DIAGNOSIS — N39.0 URINARY TRACT INFECTION WITH HEMATURIA, SITE UNSPECIFIED: ICD-10-CM

## 2018-06-22 DIAGNOSIS — R31.9 URINARY TRACT INFECTION WITH HEMATURIA, SITE UNSPECIFIED: ICD-10-CM

## 2018-06-22 DIAGNOSIS — R33.9 URINARY RETENTION: Primary | ICD-10-CM

## 2018-06-22 LAB
BACTERIA #/AREA URNS HPF: ABNORMAL /HPF
BILIRUB UR QL STRIP: NEGATIVE
CLARITY UR: CLEAR
COLOR UR: YELLOW
GLUCOSE UR QL STRIP: NEGATIVE
HGB UR QL STRIP: ABNORMAL
HYALINE CASTS #/AREA URNS LPF: 0 /LPF
KETONES UR QL STRIP: NEGATIVE
LEUKOCYTE ESTERASE UR QL STRIP: NEGATIVE
MICROSCOPIC COMMENT: ABNORMAL
NITRITE UR QL STRIP: NEGATIVE
PH UR STRIP: 6 [PH] (ref 5–8)
PROT UR QL STRIP: ABNORMAL
RBC #/AREA URNS HPF: >100 /HPF (ref 0–4)
SP GR UR STRIP: 1.01 (ref 1–1.03)
URN SPEC COLLECT METH UR: ABNORMAL
UROBILINOGEN UR STRIP-ACNC: NEGATIVE EU/DL
WBC #/AREA URNS HPF: 15 /HPF (ref 0–5)

## 2018-06-22 PROCEDURE — 81000 URINALYSIS NONAUTO W/SCOPE: CPT

## 2018-06-22 PROCEDURE — 99283 EMERGENCY DEPT VISIT LOW MDM: CPT

## 2018-06-22 RX ORDER — CIPROFLOXACIN 500 MG/1
500 TABLET ORAL 2 TIMES DAILY
Qty: 20 TABLET | Refills: 0 | Status: SHIPPED | OUTPATIENT
Start: 2018-06-22 | End: 2018-06-22

## 2018-06-22 RX ORDER — CIPROFLOXACIN 500 MG/1
500 TABLET ORAL 2 TIMES DAILY
Qty: 20 TABLET | Refills: 0 | Status: SHIPPED | OUTPATIENT
Start: 2018-06-22 | End: 2018-07-02

## 2018-06-22 NOTE — ED PROVIDER NOTES
SCRIBE #1 NOTE: I, Katherine Barrera, am scribing for, and in the presence of, Sergo Gaming Jr., MD. I have scribed the entire note.      History      Chief Complaint   Patient presents with    Urinary Retention     Indwelling catheter removed yesterday.  Difficulty voiding today.       Review of patient's allergies indicates:   Allergen Reactions    Iodinated contrast- oral and iv dye Hives     Tachycardia    Omeprazole Hives and Itching    Prilosec [omeprazole magnesium] Hives and Itching        HPI   HPI    6/22/2018, 3:33 AM   History obtained from the patient      History of Present Illness: Erendira Pretty is a 73 y.o. male patient who presents to the Emergency Department for difficulty urinating which onset gradually at 10pm. Patient reports having prostate biopsy 3 days ago with Dr. Cordova and had hernandez placed. Patient reports hernandez was removed this morning and had no difficulty urinating until 10pm. Symptoms are constant and moderate in severity. No mitigating or exacerbating factors reported. Associated sxs include progressive suprapubic fullness. Patient denies any fever, chills, abd pain, N/V/D, flank pain, and all other sxs at this time. No further complaints or concerns at this time.     Arrival mode: Personal vehicle      PCP: Cortes Roblero MD       Past Medical History:  Past Medical History:   Diagnosis Date    Aortic stenosis     Asthma     BPH (benign prostatic hyperplasia)     CAD (coronary artery disease)     40% lesion 2002;lazo    Cirrhosis     Claudication 4/9/2014    Colon polyp     ED (erectile dysfunction)     Encounter for blood transfusion     Ex-smoker     Hearing loss NEC     Hepatitis C     Cured;reji brown 2015    Hyperlipidemia     Hypertension     Hypothyroid     s/p tx graves    DELONTE on CPAP     Osteoarthritis     PVD (peripheral vascular disease)     Type 2 diabetes mellitus        Past Surgical History:  Past Surgical History:   Procedure  Laterality Date    CARDIAC CATHETERIZATION      CARPAL TUNNEL RELEASE Left     CATARACT EXTRACTION W/ INTRAOCULAR LENS  IMPLANT, BILATERAL  2008    COLONOSCOPY  8/2013    COLONOSCOPY N/A 5/30/2016    Procedure: COLONOSCOPY;  Surgeon: Anna Tomas MD;  Location: [facility] ENDO;  Service: Endoscopy;  Laterality: N/A;    ELBOW BURSA SURGERY Right 2010    EYE SURGERY      KNEE SURGERY Right     RECTAL SURGERY  2012    TRANSURETHRAL RESECTION OF PROSTATE (TURP) WITHOUT USE OF LASER N/A 6/19/2018    Procedure: TURP, WITHOUT USING LASER;  Surgeon: Cooper Cordova IV, MD;  Location: [facility] OR;  Service: Urology;  Laterality: N/A;    TRIGGER FINGER RELEASE Left          Family History:  Family History   Problem Relation Age of Onset    Heart disease Mother     Heart disease Father     Heart disease Brother     Colon cancer Neg Hx        Social History:  Social History     Social History Main Topics    Smoking status: Former Smoker     Packs/day: 0.50     Years: 4.00     Quit date: 6/12/1972    Smokeless tobacco: Former User     Types: Chew     Quit date: 11/18/1976    Alcohol use 1.2 oz/week     2 Cans of beer per week      Comment: daily  No alcohol prior to surgery    Drug use: No    Sexual activity: Yes     Partners: Female       ROS   Review of Systems   Constitutional: Negative for chills and fever.   HENT: Negative for sore throat.    Respiratory: Negative for shortness of breath.    Cardiovascular: Negative for chest pain.   Gastrointestinal: Negative for abdominal pain, diarrhea, nausea and vomiting.        (+) suprapubic fullness   Genitourinary: Positive for difficulty urinating. Negative for dysuria and flank pain.   Musculoskeletal: Negative for back pain.   Skin: Negative for rash.   Neurological: Negative for weakness.   Hematological: Does not bruise/bleed easily.   All other systems reviewed and are negative.      Physical Exam      Initial Vitals [06/22/18 0251]   BP Pulse Resp Temp SpO2   (!)  "172/94 70 18 98.6 °F (37 °C) 95 %      MAP       --          Physical Exam  Nursing Notes and Vital Signs Reviewed.  Constitutional: Patient is in no acute distress. Well-developed and well-nourished.  Head: Atraumatic. Normocephalic.  Eyes: PERRL. EOM intact. Conjunctivae are not pale. No scleral icterus.  ENT: Mucous membranes are moist. Oropharynx is clear and symmetric.    Neck: Supple. Full ROM. No lymphadenopathy.  Cardiovascular: Regular rate. Regular rhythm. No murmurs, rubs, or gallops. Distal pulses are 2+ and symmetric.  Pulmonary/Chest: No respiratory distress. Clear to auscultation bilaterally. No wheezing or rales.  Abdominal: Soft and non-distended.  There is no tenderness.  No rebound, guarding, or rigidity. Good bowel sounds. Palpable bladder.  Genitourinary: No CVA tenderness  Musculoskeletal: Moves all extremities. No obvious deformities. No edema. No calf tenderness.  Skin: Warm and dry.  Neurological:  Alert, awake, and appropriate.  Normal speech.  No acute focal neurological deficits are appreciated.  Psychiatric: Normal affect. Good eye contact. Appropriate in content.    ED Course    Procedures  ED Vital Signs:  Vitals:    06/22/18 0251 06/22/18 0522   BP: (!) 172/94 (!) 165/78   Pulse: 70 68   Resp: 18 18   Temp: 98.6 °F (37 °C) 98.5 °F (36.9 °C)   TempSrc: Oral    SpO2: 95% 97%   Weight: 110 kg (242 lb 8.1 oz)    Height: 5' 8" (1.727 m)        Abnormal Lab Results:  Labs Reviewed   URINALYSIS - Abnormal; Notable for the following:        Result Value    Protein, UA 1+ (*)     Occult Blood UA 3+ (*)     All other components within normal limits   URINALYSIS MICROSCOPIC - Abnormal; Notable for the following:     RBC, UA >100 (*)     WBC, UA 15 (*)     All other components within normal limits        All Lab Results:  Results for orders placed or performed during the hospital encounter of 06/22/18   Urinalysis   Result Value Ref Range    Specimen UA Urine, Clean Catch     Color, UA Yellow " Yellow, Straw, Shayy    Appearance, UA Clear Clear    pH, UA 6.0 5.0 - 8.0    Specific Gravity, UA 1.010 1.005 - 1.030    Protein, UA 1+ (A) Negative    Glucose, UA Negative Negative    Ketones, UA Negative Negative    Bilirubin (UA) Negative Negative    Occult Blood UA 3+ (A) Negative    Nitrite, UA Negative Negative    Urobilinogen, UA Negative <2.0 EU/dL    Leukocytes, UA Negative Negative   Urinalysis Microscopic   Result Value Ref Range    RBC, UA >100 (H) 0 - 4 /hpf    WBC, UA 15 (H) 0 - 5 /hpf    Bacteria, UA Rare None-Occ /hpf    Hyaline Casts, UA 0 0-1/lpf /lpf    Microscopic Comment SEE COMMENT        Imaging Results:  Imaging Results    None                 The Emergency Provider reviewed the vital signs and test results, which are outlined above.    ED Discussion     4:21 AM: Reassessed pt at this time.  Pt states his condition has improved at this time. Catheter successfully placed. Patient will be discharged with catheter in place. Discussed with pt all pertinent ED information and results. Discussed pt dx and plan of tx. Gave pt all f/u and return to the ED instructions. All questions and concerns were addressed at this time. Pt expresses understanding of information and instructions, and is comfortable with plan to discharge. Pt is stable for discharge.    I discussed with patient and/or family/caretaker that evaluation in the ED does not suggest any emergent or life threatening medical conditions requiring immediate intervention beyond what was provided in the ED, and I believe patient is safe for discharge.  Regardless, an unremarkable evaluation in the ED does not preclude the development or presence of a serious of life threatening condition. As such, patient was instructed to return immediately for any worsening or change in current symptoms.    For URINARY TRACT INFECTION, I instructed patient to avoid holding in urine and recommended that he urinate when he feels the urge.  Also advised  patient to drink plenty of liquids and reminded patient that he may need to drink more fluids than usual to help flush out the bacteria. Instructed patient to avoid alcohol, caffeine, and citrus juices as these substances can irritate your bladder and increase your symptoms.  Also recommended that patient apply heat to lower abdomen for 20 to 30 minutes every two hours to help decrease discomfort and pressure in the bladder.      ED Medication(s):  Medications - No data to display    Discharge Medication List as of 6/22/2018  4:20 AM          Follow-up Information     Cortes Roblero MD. Schedule an appointment as soon as possible for a visit in 1 week.    Specialty:  Family Medicine  Contact information:  [address]  [place] [id number] [phone]             Cooper Cordova IV, MD. Schedule an appointment as soon as possible for a visit in 1 week.    Specialty:  Urology  Contact information:  [address] [facility] [address]  [phone]             [facility]    Specialty:  Emergency Medicine  Why:  As needed, If symptoms worsen  Contact information:  [address] [facility] [id number] [phone]                   Medical Decision Making    Medical Decision Making:   Clinical Tests:   Lab Tests: Ordered and Reviewed           Scribe Attestation:   Scribe #1: I performed the above scribed service and the documentation accurately describes the services I performed. I attest to the accuracy of the note.    Attending:   Physician Attestation Statement for Scribe #1: I, Sergo Gaming Jr., MD, personally performed the services described in this documentation, as scribed by Katherine Barrera, in my presence, and it is both accurate and complete.          Clinical Impression       ICD-10-CM ICD-9-CM   1. Urinary retention R33.9 788.20   2. Urinary tract infection with hematuria, site unspecified N39.0 599.0    R31.9        Disposition:   Disposition:  Discharged  Condition: Stable         Sergo Gaming Jr., MD  06/25/18 1586

## 2018-06-22 NOTE — DISCHARGE INSTRUCTIONS
For URINARY TRACT INFECTION, I instructed patient to avoid holding in urine and recommended that he urinate when he feels the urge.  Also advised patient to drink plenty of liquids and reminded patient that he may need to drink more fluids than usual to help flush out the bacteria. Instructed patient to avoid alcohol, caffeine, and citrus juices as these substances can irritate your bladder and increase your symptoms.  Also recommended that patient apply heat to lower abdomen for 20 to 30 minutes every two hours to help decrease discomfort and pressure in the bladder.

## 2018-06-22 NOTE — TELEPHONE ENCOUNTER
----- Message from Nando Dejesus sent at 6/22/2018 10:04 AM CDT -----  Contact: pt   States he's calling rg wanting to discuss some issues and needs to speak with  today and can be reached at [phone]//[email or web address]

## 2018-06-22 NOTE — TELEPHONE ENCOUNTER
Patient states hernandez was removed yesterday but he went to ER last night because he couldn't empty his bladder. Hernandez was replaced. Patient would like to know when the catheter can be removed. Post op appointment is 7/3/18. Please advise.

## 2018-06-25 ENCOUNTER — TELEPHONE (OUTPATIENT)
Dept: UROLOGY | Facility: CLINIC | Age: 73
End: 2018-06-25

## 2018-06-25 NOTE — TELEPHONE ENCOUNTER
Notified patient that catheter should stay in until post op appointment per Dr. Cordova. Patient states understanding.

## 2018-06-25 NOTE — TELEPHONE ENCOUNTER
Patient states he is seeing a little blood in his catheter bag after exercising. Patient denies fever, nausea, vomiting, or pain. Advised patient that blood in the urine is expected and to increase fluid intake. Patient states understanding.

## 2018-06-25 NOTE — TELEPHONE ENCOUNTER
----- Message from Ledy Obregon sent at 6/25/2018 12:51 PM CDT -----  Contact: pt  Calling in regards to having problem with the catheter and please advise. [phone]

## 2018-07-02 ENCOUNTER — OFFICE VISIT (OUTPATIENT)
Dept: INTERNAL MEDICINE | Facility: CLINIC | Age: 73
End: 2018-07-02
Payer: MEDICARE

## 2018-07-02 VITALS
OXYGEN SATURATION: 97 % | HEIGHT: 68 IN | WEIGHT: 241.19 LBS | SYSTOLIC BLOOD PRESSURE: 118 MMHG | DIASTOLIC BLOOD PRESSURE: 60 MMHG | BODY MASS INDEX: 36.55 KG/M2 | HEART RATE: 67 BPM | TEMPERATURE: 98 F

## 2018-07-02 DIAGNOSIS — I10 ESSENTIAL HYPERTENSION: ICD-10-CM

## 2018-07-02 DIAGNOSIS — I25.10 CORONARY ARTERY DISEASE INVOLVING NATIVE CORONARY ARTERY OF NATIVE HEART WITHOUT ANGINA PECTORIS: ICD-10-CM

## 2018-07-02 DIAGNOSIS — K74.60 CIRRHOSIS OF LIVER WITHOUT ASCITES, UNSPECIFIED HEPATIC CIRRHOSIS TYPE: ICD-10-CM

## 2018-07-02 DIAGNOSIS — E11.9 TYPE 2 DIABETES MELLITUS WITHOUT COMPLICATION, WITHOUT LONG-TERM CURRENT USE OF INSULIN: Primary | ICD-10-CM

## 2018-07-02 DIAGNOSIS — E03.9 ACQUIRED HYPOTHYROIDISM: ICD-10-CM

## 2018-07-02 PROCEDURE — 3078F DIAST BP <80 MM HG: CPT | Mod: CPTII,S$GLB,, | Performed by: FAMILY MEDICINE

## 2018-07-02 PROCEDURE — 3044F HG A1C LEVEL LT 7.0%: CPT | Mod: CPTII,S$GLB,, | Performed by: FAMILY MEDICINE

## 2018-07-02 PROCEDURE — 99214 OFFICE O/P EST MOD 30 MIN: CPT | Mod: S$GLB,,, | Performed by: FAMILY MEDICINE

## 2018-07-02 PROCEDURE — 3074F SYST BP LT 130 MM HG: CPT | Mod: CPTII,S$GLB,, | Performed by: FAMILY MEDICINE

## 2018-07-02 PROCEDURE — 99999 PR PBB SHADOW E&M-EST. PATIENT-LVL III: CPT | Mod: PBBFAC,,, | Performed by: FAMILY MEDICINE

## 2018-07-02 RX ORDER — TRIAMCINOLONE ACETONIDE 0.25 MG/G
CREAM TOPICAL
Refills: 6 | COMMUNITY
Start: 2018-05-31 | End: 2018-11-12

## 2018-07-02 NOTE — PROGRESS NOTES
Subjective:       Patient ID: Erendira Pretty is a male.    Chief Complaint: Multiple issues see below    HPI type 2 dm controlled;  hep c cure s/p txharvoni ;cirrhosis;utd dr gibbs    htn at [facility]  hypoth: tsh nl   Coronary artery disease:/pvd/aortic stenosis: up to date with cardiology. No chest pain, palpitations or shortness of breath. Tolerating medication.    Asthma: no c/o;utd pulm  DIAPK asympt utd     hematosperm eval done bx ureth polyps per patient. Had urin retntion and has cath temporary now              Past Medical History   Diagnosis Date    Type 2 diabetes mellitus     Hepatitis C          Hypertension     Hyperlipidemia     Hypothyroid      s/p tx graves    Sleep apnea     Hearing loss NEC     Osteoarthritis     CAD (coronary artery disease)      40% lesion 2002;violet    PVD (peripheral vascular disease)     Claudication 4/9/2014    Ex-smoker     ED (erectile dysfunction)     BPH (benign prostatic hyperplasia)      Past Surgical History   Procedure Laterality Date    Knee surgery      Trigger finger release      Carpal tunnel release      Elbow bursa surgery  2010    Rectal surgery  2012    Eye surgery      Cataract extraction w/ intraocular lens  implant, bilateral  2008    Cataract extraction      Cardiac catheterization       Family History   Problem Relation Age of Onset    Heart disease Mother     Heart disease Father     Heart disease Brother        Review of Systems  Cardiovascular: no chest pain  Chest: no shortness of breath  Abd: no abd pain  Remainder review of systems negative    Objective:    cvrrr  Chest ctablat  Abd+bs softntnd no hsm no mass       Assessment:     type 2 dm    htn  cirrhosis  Cad  bph  Asthma  pvd  AS  Plan:    Lab and follow up after in 6 months  . F/u gi as sched  . f/u Dr Gallardo when due; and aort u/s   car u/s as sched    Sees urol tomorrow;bx pnd    Shingrix new shingles vaccine  via a pharmacy  Type 2 diabetes mellitus without  complication, without long-term current use of insulin  -     Comprehensive metabolic panel; Future; Expected date: 12/29/2018  -     Lipid panel; Future; Expected date: 12/29/2018  -     Microalbumin/creatinine urine ratio; Future; Expected date: 12/29/2018  -     Hemoglobin A1c; Future; Expected date: 12/29/2018    Coronary artery disease involving native coronary artery of native heart without angina pectoris    Essential hypertension    Cirrhosis of liver without ascites, unspecified hepatic cirrhosis type    Acquired hypothyroidism  -     TSH; Future; Expected date: 12/29/2018

## 2018-07-03 ENCOUNTER — OFFICE VISIT (OUTPATIENT)
Dept: UROLOGY | Facility: CLINIC | Age: 73
End: 2018-07-03
Payer: MEDICARE

## 2018-07-03 VITALS
SYSTOLIC BLOOD PRESSURE: 140 MMHG | DIASTOLIC BLOOD PRESSURE: 82 MMHG | BODY MASS INDEX: 36.75 KG/M2 | HEIGHT: 68 IN | WEIGHT: 242.5 LBS | HEART RATE: 69 BPM

## 2018-07-03 DIAGNOSIS — N36.2 URETHRAL POLYP: Primary | ICD-10-CM

## 2018-07-03 PROCEDURE — 99999 PR PBB SHADOW E&M-EST. PATIENT-LVL II: CPT | Mod: PBBFAC,,, | Performed by: UROLOGY

## 2018-07-03 PROCEDURE — 99024 POSTOP FOLLOW-UP VISIT: CPT | Mod: S$GLB,,, | Performed by: UROLOGY

## 2018-07-03 RX ORDER — TAMSULOSIN HYDROCHLORIDE 0.4 MG/1
0.4 CAPSULE ORAL DAILY
Qty: 30 CAPSULE | Refills: 11 | Status: SHIPPED | OUTPATIENT
Start: 2018-07-03 | End: 2018-07-12

## 2018-07-03 NOTE — PROGRESS NOTES
"Chief Complaint: Hematospermia    HPI:   7/3/18: Finds that if he sits his urine is fine, if he walks too much he gets some hematuria.  Had retention after hernandez removed about 10d ago, had it replaced later that night.  Path benign.  6/19/18: TURP/urethral polyp resection  6/4/18: Ct Urogram reassuring only some small bilateral simple cysts.  Cysto shows a couple of small polyps int he prostatic urethra proximal to the veru, and just distal to the veru at 6 o'clock there is a different polypoid mass resembling Ta TCC.  5/7/18: A couple weeks ago he had a lot of red blood in the semen again.  No other symptoms at all.  Occasional sildanefil.  US mentions a new complex left renal cyst.  4/4/17: The burning symptoms are gone, the fresh blood has abated, and has had a change to some brown color to the semen still.  2/14/17: Two times last week had some hematospermia.  No gross hematuria.  No LUTS.  Has a burning sensation that just started.  Is coming off some Abx for URI (azithromycin/PCN).  Finds a burning sensation in the base of the penis, under scrotum at the end of voiding and then is better but still felt.  Uses a CPAP, nocturia x2-3.  Dr. Roblero checks prostate annually.  8/15: Melissa: "70-year-old male returns to the clinic today for follow-up on his BPH.  At his last visit, we had discussed that restricting his intake of caffeine had significantly helped his daytime symptoms, but he was still having trouble at night with having to get up to urinate.  We discussed monitoring his fluid intake in the evenings even more strictly, and he stated that he would prefer to do that rather than start on medication.  Since then, he has been able to restrict his evening fluid intake even more, and is doing better.  He is not interested in starting any medication at the present time. He is not having any other urinary symptoms or problems, and his urinalysis here in the clinic today is normal."    Allergies:  Iodinated " contrast- oral and iv dye; Omeprazole; and Prilosec [omeprazole magnesium]    Medications:  has a current medication list which includes the following prescription(s): albuterol, amlodipine-valsartan, blood sugar diagnostic, budesonide-formoterol 160-4.5 mcg, cyclosporine, docusate sodium, ezetimibe, glucosamine/chondr leach a sod, humalog kwikpen insulin, hydrochlorothiazide, lancets, levocetirizine, mometasone, montelukast, multivit,iron,mins/folic acid, pen needle, diabetic, restasis, sildenafil, synthroid, triamcinolone acetonide 0.025%, insulin glargine, metoprolol succinate, omega-3 fatty acids/dha/epa, oxycodone-acetaminophen, rabeprazole, saw palmetto, and UNABLE TO FIND, and the following Facility-Administered Medications: dexamethasone.    Review of Systems:  General: No fever, chills, fatigability, or weight loss.  Skin: No rashes, itching, or changes in color or texture of skin.  Chest: Denies MARCUS, cyanosis, wheezing, cough, and sputum production.  Abdomen: Appetite fine. No weight loss. Denies diarrhea, abdominal pain, hematemesis, or blood in stool.  Musculoskeletal: No joint stiffness or swelling. Denies back pain.  : As above.  All other review of systems negative.    PMH:   has a past medical history of Aortic stenosis; Asthma; BPH (benign prostatic hyperplasia); CAD (coronary artery disease); Cirrhosis; Claudication (4/9/2014); Colon polyp; ED (erectile dysfunction); Encounter for blood transfusion; Ex-smoker; Hearing loss NEC; Hepatitis C; Hyperlipidemia; Hypertension; Hypothyroid; DELONTE on CPAP; Osteoarthritis; PVD (peripheral vascular disease); and Type 2 diabetes mellitus.    PSH:   has a past surgical history that includes Knee surgery (Right); Trigger finger release (Left); Carpal tunnel release (Left); Elbow bursa surgery (Right, 2010); Rectal surgery (2012); Eye surgery; Cataract extraction w/ intraocular lens  implant, bilateral (2008); Cardiac catheterization; Colonoscopy (8/2013);  Colonoscopy (N/A, 5/30/2016); and transurethral resection of prostate (turp) without use of laser (N/A, 6/19/2018).    FamHx: family history includes Heart disease in his brother, father, and mother.    SocHx:  reports that he quit smoking about 46 years ago. He has a 2.00 pack-year smoking history. He quit smokeless tobacco use about 41 years ago. His smokeless tobacco use included Chew. He reports that he drinks about 1.2 oz of alcohol per week . He reports that he does not use drugs.      Physical Exam:  Vitals:    07/03/18 1548   BP: (!) 140/82   Pulse: 69     General: A&Ox3, no apparent distress, no deformities  Neck: No masses, normal thyroid  Lungs: normal inspiration, no use of accessory muscles  Heart: normal pulse, no arrhythmias  Abdomen: Soft, NT, ND  Skin: The skin is warm and dry. No jaundice.  Ext: No c/c/e.  :   2/17: Test desc elodia, no abnormalities of epididymus. Penis normal, with normal penile and scrotal skin. Meatus normal. Normal rectal tone, no hemorrhoids. Prost 20 gm no nodules or masses appreciated. SV not palpable. Perineum and anus normal.    Labs/Studies:   Urinalysis performed in clinic, summary: UA normal  PSA    6/18: 0.35    3/16: 0.4    Impression/Plan:   1. Removed hernandez today.  Flomax rx.  RTC 1 wk

## 2018-07-08 RX ORDER — EZETIMIBE 10 MG/1
TABLET ORAL
Qty: 90 TABLET | Refills: 3 | Status: SHIPPED | OUTPATIENT
Start: 2018-07-08 | End: 2018-11-14 | Stop reason: ALTCHOICE

## 2018-07-12 ENCOUNTER — OFFICE VISIT (OUTPATIENT)
Dept: UROLOGY | Facility: CLINIC | Age: 73
End: 2018-07-12
Payer: MEDICARE

## 2018-07-12 VITALS
HEART RATE: 61 BPM | DIASTOLIC BLOOD PRESSURE: 84 MMHG | WEIGHT: 242.5 LBS | HEIGHT: 68 IN | BODY MASS INDEX: 36.75 KG/M2 | SYSTOLIC BLOOD PRESSURE: 146 MMHG

## 2018-07-12 DIAGNOSIS — N40.0 BENIGN PROSTATIC HYPERPLASIA, UNSPECIFIED WHETHER LOWER URINARY TRACT SYMPTOMS PRESENT: Primary | ICD-10-CM

## 2018-07-12 DIAGNOSIS — R36.1 HEMATOSPERMIA: ICD-10-CM

## 2018-07-12 LAB
BILIRUB SERPL-MCNC: NORMAL MG/DL
BLOOD URINE, POC: 50
COLOR, POC UA: YELLOW
GLUCOSE UR QL STRIP: NORMAL
KETONES UR QL STRIP: NORMAL
LEUKOCYTE ESTERASE URINE, POC: NORMAL
NITRITE, POC UA: NORMAL
PH, POC UA: 6
PROTEIN, POC: NORMAL
SPECIFIC GRAVITY, POC UA: 1.01
UROBILINOGEN, POC UA: NORMAL

## 2018-07-12 PROCEDURE — 99024 POSTOP FOLLOW-UP VISIT: CPT | Mod: S$GLB,,, | Performed by: UROLOGY

## 2018-07-12 PROCEDURE — 99999 PR PBB SHADOW E&M-EST. PATIENT-LVL II: CPT | Mod: PBBFAC,,, | Performed by: UROLOGY

## 2018-07-12 PROCEDURE — 81002 URINALYSIS NONAUTO W/O SCOPE: CPT | Mod: S$GLB,,, | Performed by: UROLOGY

## 2018-07-12 RX ORDER — FINASTERIDE 5 MG/1
5 TABLET, FILM COATED ORAL DAILY
Qty: 30 TABLET | Refills: 11 | Status: SHIPPED | OUTPATIENT
Start: 2018-07-12 | End: 2019-07-01 | Stop reason: SDUPTHER

## 2018-07-12 NOTE — PROGRESS NOTES
"Chief Complaint: Hematospermia    HPI:   7/12/18: Hernandez out, stream is good.  Voiding fine.  Can't tell a difference from the flomax.  7/3/18: Finds that if he sits his urine is fine, if he walks too much he gets some hematuria.  Had retention after hernandez removed about 10d ago, had it replaced later that night.  Path benign.  6/19/18: TURP/urethral polyp resection  6/4/18: Ct Urogram reassuring only some small bilateral simple cysts.  Cysto shows a couple of small polyps int he prostatic urethra proximal to the veru, and just distal to the veru at 6 o'clock there is a different polypoid mass resembling Ta TCC.  5/7/18: A couple weeks ago he had a lot of red blood in the semen again.  No other symptoms at all.  Occasional sildanefil.  US mentions a new complex left renal cyst.  4/4/17: The burning symptoms are gone, the fresh blood has abated, and has had a change to some brown color to the semen still.  2/14/17: Two times last week had some hematospermia.  No gross hematuria.  No LUTS.  Has a burning sensation that just started.  Is coming off some Abx for URI (azithromycin/PCN).  Finds a burning sensation in the base of the penis, under scrotum at the end of voiding and then is better but still felt.  Uses a CPAP, nocturia x2-3.  Dr. Roblero checks prostate annually.  8/15: Melissa: "70-year-old male returns to the clinic today for follow-up on his BPH.  At his last visit, we had discussed that restricting his intake of caffeine had significantly helped his daytime symptoms, but he was still having trouble at night with having to get up to urinate.  We discussed monitoring his fluid intake in the evenings even more strictly, and he stated that he would prefer to do that rather than start on medication.  Since then, he has been able to restrict his evening fluid intake even more, and is doing better.  He is not interested in starting any medication at the present time. He is not having any other urinary symptoms or " "problems, and his urinalysis here in the clinic today is normal."    Allergies:  Iodinated contrast- oral and iv dye; Omeprazole; and Prilosec [omeprazole magnesium]    Medications:  has a current medication list which includes the following prescription(s): albuterol, amlodipine-valsartan, blood sugar diagnostic, budesonide-formoterol 160-4.5 mcg, cyclosporine, docusate sodium, ezetimibe, glucosamine/chondr leach a sod, humalog kwikpen insulin, hydrochlorothiazide, insulin glargine, lancets, levocetirizine, metoprolol succinate, mometasone, montelukast, multivit,iron,mins/folic acid, omega-3 fatty acids/dha/epa, oxycodone-acetaminophen, pen needle, diabetic, rabeprazole, restasis, saw palmetto, sildenafil, synthroid, tamsulosin, triamcinolone acetonide 0.025%, and UNABLE TO FIND, and the following Facility-Administered Medications: dexamethasone.    Review of Systems:  General: No fever, chills, fatigability, or weight loss.  Skin: No rashes, itching, or changes in color or texture of skin.  Chest: Denies MARCUS, cyanosis, wheezing, cough, and sputum production.  Abdomen: Appetite fine. No weight loss. Denies diarrhea, abdominal pain, hematemesis, or blood in stool.  Musculoskeletal: No joint stiffness or swelling. Denies back pain.  : As above.  All other review of systems negative.    PMH:   has a past medical history of Aortic stenosis; Asthma; BPH (benign prostatic hyperplasia); CAD (coronary artery disease); Cirrhosis; Claudication (4/9/2014); Colon polyp; ED (erectile dysfunction); Encounter for blood transfusion; Ex-smoker; Hearing loss NEC; Hepatitis C; Hyperlipidemia; Hypertension; Hypothyroid; DELONTE on CPAP; Osteoarthritis; PVD (peripheral vascular disease); and Type 2 diabetes mellitus.    PSH:   has a past surgical history that includes Knee surgery (Right); Trigger finger release (Left); Carpal tunnel release (Left); Elbow bursa surgery (Right, 2010); Rectal surgery (2012); Eye surgery; Cataract extraction " w/ intraocular lens  implant, bilateral (2008); Cardiac catheterization; Colonoscopy (8/2013); Colonoscopy (N/A, 5/30/2016); and transurethral resection of prostate (turp) without use of laser (N/A, 6/19/2018).    FamHx: family history includes Heart disease in his brother, father, and mother.    SocHx:  reports that he quit smoking about 46 years ago. He has a 2.00 pack-year smoking history. He quit smokeless tobacco use about 41 years ago. His smokeless tobacco use included Chew. He reports that he drinks about 1.2 oz of alcohol per week . He reports that he does not use drugs.      Physical Exam:  Vitals:    07/12/18 1505   BP: (!) 146/84   Pulse: 61     General: A&Ox3, no apparent distress, no deformities  Neck: No masses, normal thyroid  Lungs: normal inspiration, no use of accessory muscles  Heart: normal pulse, no arrhythmias  Abdomen: Soft, NT, ND  Skin: The skin is warm and dry. No jaundice.  Ext: No c/c/e.  :   2/17: Test desc elodia, no abnormalities of epididymus. Penis normal, with normal penile and scrotal skin. Meatus normal. Normal rectal tone, no hemorrhoids. Prost 20 gm no nodules or masses appreciated. SV not palpable. Perineum and anus normal.    Labs/Studies:   Urinalysis performed in clinic, summary: UA normal  PSA    6/18: 0.35    3/16: 0.4    Impression/Plan:   1. D/C flomax. Start finasteride for long term improvement of likely prostate-related problems.

## 2018-07-14 RX ORDER — HYDROCHLOROTHIAZIDE 12.5 MG/1
TABLET ORAL
Qty: 30 TABLET | Refills: 0 | Status: SHIPPED | OUTPATIENT
Start: 2018-07-14 | End: 2018-08-22 | Stop reason: SDUPTHER

## 2018-07-16 ENCOUNTER — LAB VISIT (OUTPATIENT)
Dept: LAB | Facility: HOSPITAL | Age: 73
End: 2018-07-16
Attending: UROLOGY
Payer: MEDICARE

## 2018-07-16 ENCOUNTER — TELEPHONE (OUTPATIENT)
Dept: UROLOGY | Facility: CLINIC | Age: 73
End: 2018-07-16

## 2018-07-16 DIAGNOSIS — R10.9 FLANK PAIN: ICD-10-CM

## 2018-07-16 DIAGNOSIS — R30.0 DYSURIA: Primary | ICD-10-CM

## 2018-07-16 DIAGNOSIS — R31.9 HEMATURIA, UNSPECIFIED TYPE: ICD-10-CM

## 2018-07-16 DIAGNOSIS — R30.0 DYSURIA: ICD-10-CM

## 2018-07-16 LAB
BACTERIA #/AREA URNS AUTO: ABNORMAL /HPF
MICROSCOPIC COMMENT: ABNORMAL
RBC #/AREA URNS AUTO: 50 /HPF (ref 0–4)
WBC #/AREA URNS AUTO: 1 /HPF (ref 0–5)

## 2018-07-16 PROCEDURE — 81001 URINALYSIS AUTO W/SCOPE: CPT

## 2018-07-16 PROCEDURE — 87086 URINE CULTURE/COLONY COUNT: CPT

## 2018-07-16 NOTE — TELEPHONE ENCOUNTER
----- Message from Melisa Mckeon sent at 7/16/2018  8:13 AM CDT -----  Contact: Patient  Patient called and stated he had a procedure done a few weeks ago and now he is having some discomfort. He is having blood in his urine and lower abdominal pain. He can be contacted at [phone].    Thanks,  melisa

## 2018-07-16 NOTE — TELEPHONE ENCOUNTER
Patient had surgery with you on 6/19/18 for TURP/urethral polyp resection. He is complaining of left sided flank pain, 4/10 lower abdominal pain, dysuria, and light hematuria. Scheduled lab appointment for ua and cs, and advised patient to increase water intake. Please let me know if you have any other recommendations.

## 2018-07-17 ENCOUNTER — TELEPHONE (OUTPATIENT)
Dept: UROLOGY | Facility: CLINIC | Age: 73
End: 2018-07-17

## 2018-07-17 LAB — BACTERIA UR CULT: NO GROWTH

## 2018-07-17 NOTE — TELEPHONE ENCOUNTER
----- Message from Becki Nixon sent at 7/17/2018  8:23 AM CDT -----  Contact: pt  States he's returning a nurse call. Please call pt at [phone]. Thank you

## 2018-07-18 ENCOUNTER — TELEPHONE (OUTPATIENT)
Dept: UROLOGY | Facility: CLINIC | Age: 73
End: 2018-07-18

## 2018-07-18 ENCOUNTER — TELEPHONE (OUTPATIENT)
Dept: RADIOLOGY | Facility: HOSPITAL | Age: 73
End: 2018-07-18

## 2018-07-18 NOTE — TELEPHONE ENCOUNTER
Notified patient of negative urine culture results. Patient states he is still burning when he urinates and would like to know what's going on and why he is experiencing the dysuria. Please advise.

## 2018-07-18 NOTE — TELEPHONE ENCOUNTER
Notified patient that Dr. Cordova still has the same recommendations. And will wait to further instruct once CT scan is completed.

## 2018-07-18 NOTE — TELEPHONE ENCOUNTER
Patient also complaining of episode of gross hematuria with clots on Saturday and yesterday. Patient denies fever, nausea, or vomiting. Wants to know if he should be concerned.

## 2018-07-19 ENCOUNTER — HOSPITAL ENCOUNTER (OUTPATIENT)
Dept: RADIOLOGY | Facility: HOSPITAL | Age: 73
Discharge: HOME | End: 2018-07-19
Attending: UROLOGY
Payer: MEDICARE

## 2018-07-19 DIAGNOSIS — R10.9 FLANK PAIN: ICD-10-CM

## 2018-07-19 PROCEDURE — 74176 CT ABD & PELVIS W/O CONTRAST: CPT | Mod: TC,PO

## 2018-07-19 PROCEDURE — 74176 CT ABD & PELVIS W/O CONTRAST: CPT | Mod: 26,,, | Performed by: RADIOLOGY

## 2018-07-31 ENCOUNTER — TELEPHONE (OUTPATIENT)
Dept: UROLOGY | Facility: CLINIC | Age: 73
End: 2018-07-31

## 2018-07-31 NOTE — TELEPHONE ENCOUNTER
----- Message from Latisha Lyons sent at 7/31/2018  9:15 AM CDT -----  Contact: Patient   Patient would like a call back regards to his results, Please call him at [phone].    Thanks  Td

## 2018-08-22 RX ORDER — HYDROCHLOROTHIAZIDE 12.5 MG/1
TABLET ORAL
Qty: 30 TABLET | Refills: 0 | Status: SHIPPED | OUTPATIENT
Start: 2018-08-22 | End: 2018-09-21 | Stop reason: SDUPTHER

## 2018-09-07 ENCOUNTER — LAB VISIT (OUTPATIENT)
Dept: LAB | Facility: HOSPITAL | Age: 73
End: 2018-09-07
Attending: INTERNAL MEDICINE
Payer: MEDICARE

## 2018-09-07 ENCOUNTER — OFFICE VISIT (OUTPATIENT)
Dept: PULMONOLOGY | Facility: CLINIC | Age: 73
End: 2018-09-07
Payer: MEDICARE

## 2018-09-07 VITALS
OXYGEN SATURATION: 94 % | HEIGHT: 68 IN | RESPIRATION RATE: 17 BRPM | DIASTOLIC BLOOD PRESSURE: 80 MMHG | BODY MASS INDEX: 38.49 KG/M2 | HEART RATE: 79 BPM | SYSTOLIC BLOOD PRESSURE: 126 MMHG | WEIGHT: 254 LBS

## 2018-09-07 DIAGNOSIS — G47.33 OSA ON CPAP: ICD-10-CM

## 2018-09-07 DIAGNOSIS — Z77.090 ASBESTOS EXPOSURE: ICD-10-CM

## 2018-09-07 DIAGNOSIS — J44.89 ASTHMA WITH COPD: ICD-10-CM

## 2018-09-07 DIAGNOSIS — J42 CHRONIC WHEEZY BRONCHITIS: Primary | ICD-10-CM

## 2018-09-07 DIAGNOSIS — J42 CHRONIC WHEEZY BRONCHITIS: ICD-10-CM

## 2018-09-07 PROCEDURE — 1101F PT FALLS ASSESS-DOCD LE1/YR: CPT | Mod: CPTII,,, | Performed by: INTERNAL MEDICINE

## 2018-09-07 PROCEDURE — 82785 ASSAY OF IGE: CPT

## 2018-09-07 PROCEDURE — 99999 PR PBB SHADOW E&M-EST. PATIENT-LVL III: CPT | Mod: PBBFAC,,, | Performed by: INTERNAL MEDICINE

## 2018-09-07 PROCEDURE — 86255 FLUORESCENT ANTIBODY SCREEN: CPT | Mod: 91

## 2018-09-07 PROCEDURE — 86038 ANTINUCLEAR ANTIBODIES: CPT

## 2018-09-07 PROCEDURE — 85025 COMPLETE CBC W/AUTO DIFF WBC: CPT

## 2018-09-07 PROCEDURE — 99214 OFFICE O/P EST MOD 30 MIN: CPT | Mod: 25,S$PBB,, | Performed by: INTERNAL MEDICINE

## 2018-09-07 PROCEDURE — 36415 COLL VENOUS BLD VENIPUNCTURE: CPT | Mod: PO

## 2018-09-07 PROCEDURE — 3079F DIAST BP 80-89 MM HG: CPT | Mod: CPTII,,, | Performed by: INTERNAL MEDICINE

## 2018-09-07 PROCEDURE — 3074F SYST BP LT 130 MM HG: CPT | Mod: CPTII,,, | Performed by: INTERNAL MEDICINE

## 2018-09-07 PROCEDURE — 94664 DEMO&/EVAL PT USE INHALER: CPT | Mod: PBBFAC,PO | Performed by: INTERNAL MEDICINE

## 2018-09-07 PROCEDURE — 94640 AIRWAY INHALATION TREATMENT: CPT | Mod: PBBFAC,PO

## 2018-09-07 PROCEDURE — 99213 OFFICE O/P EST LOW 20 MIN: CPT | Mod: PBBFAC,25,PO | Performed by: INTERNAL MEDICINE

## 2018-09-07 RX ORDER — IPRATROPIUM BROMIDE AND ALBUTEROL SULFATE 2.5; .5 MG/3ML; MG/3ML
3 SOLUTION RESPIRATORY (INHALATION)
Status: COMPLETED | OUTPATIENT
Start: 2018-09-07 | End: 2018-09-07

## 2018-09-07 RX ORDER — IPRATROPIUM BROMIDE AND ALBUTEROL SULFATE 2.5; .5 MG/3ML; MG/3ML
3 SOLUTION RESPIRATORY (INHALATION) 2 TIMES DAILY
Qty: 120 VIAL | Refills: 5 | Status: SHIPPED | OUTPATIENT
Start: 2018-09-07 | End: 2021-11-29

## 2018-09-07 RX ADMIN — IPRATROPIUM BROMIDE AND ALBUTEROL SULFATE 3 ML: 2.5; .5 SOLUTION RESPIRATORY (INHALATION) at 04:09
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
## 2018-09-08 LAB
BASOPHILS # BLD AUTO: 0.06 K/UL
BASOPHILS NFR BLD: 0.7 %
DIFFERENTIAL METHOD: ABNORMAL
EOSINOPHIL # BLD AUTO: 0.2 K/UL
EOSINOPHIL NFR BLD: 2.2 %
ERYTHROCYTE [DISTWIDTH] IN BLOOD BY AUTOMATED COUNT: 13.2 %
HCT VFR BLD AUTO: 41 %
HGB BLD-MCNC: 14.4 G/DL
IMM GRANULOCYTES # BLD AUTO: 0.03 K/UL
IMM GRANULOCYTES NFR BLD AUTO: 0.3 %
LYMPHOCYTES # BLD AUTO: 2.8 K/UL
LYMPHOCYTES NFR BLD: 31.3 %
MCH RBC QN AUTO: 34.4 PG
MCHC RBC AUTO-ENTMCNC: 35.1 G/DL
MCV RBC AUTO: 98 FL
MONOCYTES # BLD AUTO: 1 K/UL
MONOCYTES NFR BLD: 10.7 %
NEUTROPHILS # BLD AUTO: 4.9 K/UL
NEUTROPHILS NFR BLD: 54.8 %
NRBC BLD-RTO: 0 /100 WBC
PLATELET # BLD AUTO: 142 K/UL
PMV BLD AUTO: 11.6 FL
RBC # BLD AUTO: 4.18 M/UL
WBC # BLD AUTO: 8.95 K/UL

## 2018-09-10 LAB
ANA SER QL IF: NORMAL
IGE SERPL-ACNC: 4533 IU/ML

## 2018-09-11 DIAGNOSIS — J44.1 COPD EXACERBATION: ICD-10-CM

## 2018-09-11 RX ORDER — ALBUTEROL SULFATE 90 UG/1
AEROSOL, METERED RESPIRATORY (INHALATION)
Qty: 8.5 G | Refills: 12 | Status: SHIPPED | OUTPATIENT
Start: 2018-09-11 | End: 2018-11-12

## 2018-09-12 LAB
ANCA AB TITR SER IF: NORMAL TITER
P-ANCA TITR SER IF: NORMAL TITER

## 2018-09-14 ENCOUNTER — OFFICE VISIT (OUTPATIENT)
Dept: OPHTHALMOLOGY | Facility: CLINIC | Age: 73
End: 2018-09-14
Payer: MEDICARE

## 2018-09-14 DIAGNOSIS — E11.9 TYPE 2 DIABETES MELLITUS WITHOUT COMPLICATION, WITHOUT LONG-TERM CURRENT USE OF INSULIN: Primary | ICD-10-CM

## 2018-09-14 DIAGNOSIS — H02.9 EYELID LESION, BENIGN: ICD-10-CM

## 2018-09-14 DIAGNOSIS — H35.373 EPIRETINAL MEMBRANE (ERM) OF BOTH EYES: ICD-10-CM

## 2018-09-14 PROCEDURE — 92014 COMPRE OPH EXAM EST PT 1/>: CPT | Mod: S$PBB,25,, | Performed by: OPHTHALMOLOGY

## 2018-09-14 PROCEDURE — 92134 CPTRZ OPH DX IMG PST SGM RTA: CPT | Mod: PBBFAC,PO | Performed by: OPHTHALMOLOGY

## 2018-09-14 PROCEDURE — 99212 OFFICE O/P EST SF 10 MIN: CPT | Mod: PBBFAC,PO,25 | Performed by: OPHTHALMOLOGY

## 2018-09-14 PROCEDURE — 67850 DSTRJ LESION LID MARGIN <1CM: CPT | Mod: E4,PBBFAC,PO | Performed by: OPHTHALMOLOGY

## 2018-09-14 PROCEDURE — 99999 PR PBB SHADOW E&M-EST. PATIENT-LVL II: CPT | Mod: PBBFAC,,, | Performed by: OPHTHALMOLOGY

## 2018-09-14 PROCEDURE — 67850 DSTRJ LESION LID MARGIN <1CM: CPT | Mod: E4,S$PBB,, | Performed by: OPHTHALMOLOGY

## 2018-09-14 NOTE — PROGRESS NOTES
===============================  09/14/2018   Erendira Pretty,   73 y.o. male   Last visit [facility]: :10/16/2017   Last visit eye dept. Visit date not found  [facility]:  Uncorrected distance visual acuity was 20/30 in the right eye and 20/40 in the left eye.   Not recorded         Not recorded         Not recorded        Chief Complaint   Patient presents with    growth on eyelid     pt states that he has a bump on his lower right lid that bothers him    Diabetes        HPI     growth on eyelid      Additional comments: pt states that he has a bump on his lower right lid   that bothers him              Comments     + DIET CONTROLLED DM  MINI ERM OU  DRY EYES  ON RESTASIS  OU LL PLUGS  Very symptomtaic  restais bid  Vf 11/18/14  erm ou          Last edited by Madeline Cash on 9/14/2018 10:53 AM. (History)          ________________  9/14/2018  Problem List Items Addressed This Visit        Eye/Vision problems    Type 2 diabetes mellitus without complication - Primary    Epiretinal membrane - Both Eyes    Relevant Orders    Posterior Segment OCT Retina-Both eyes (Completed)      Other Visit Diagnoses     Eyelid lesion, benign            RLL squamous papilloma  Recommend Excise today  No BDR  Oct good    rtc 1 year      Procedure Note:   Informed consent obtained.  Proparacaine 0.5% placed in the eye.  2% lidocaine with epinephrine injected subcutaneously.   Betadine prep, sterile drape. Lesion excised.  Hemostasis achieved with light cautery.  EBL <1cc. No specimen sent. EES applied.  Tolerated well. No complications.     .       ===========================

## 2018-09-22 RX ORDER — HYDROCHLOROTHIAZIDE 12.5 MG/1
TABLET ORAL
Qty: 30 TABLET | Refills: 0 | Status: SHIPPED | OUTPATIENT
Start: 2018-09-22 | End: 2018-10-23 | Stop reason: SDUPTHER

## 2018-09-24 ENCOUNTER — TELEPHONE (OUTPATIENT)
Dept: PULMONOLOGY | Facility: CLINIC | Age: 73
End: 2018-09-24

## 2018-09-24 NOTE — TELEPHONE ENCOUNTER
----- Message from Elba Monsivais sent at 9/24/2018  8:22 AM CDT -----  Pt at [phone]//states he is wanting to know if possible to have his Pulm Lab and appt with Dr Molina rescheduled for an earlier date//he is still having problems breathing and is feeling bad//please call asap/rajani/[facility]

## 2018-09-27 ENCOUNTER — PROCEDURE VISIT (OUTPATIENT)
Dept: PULMONOLOGY | Facility: CLINIC | Age: 73
End: 2018-09-27
Payer: MEDICARE

## 2018-09-27 ENCOUNTER — OFFICE VISIT (OUTPATIENT)
Dept: SLEEP MEDICINE | Facility: CLINIC | Age: 73
End: 2018-09-27
Payer: MEDICARE

## 2018-09-27 ENCOUNTER — HOSPITAL ENCOUNTER (OUTPATIENT)
Dept: RADIOLOGY | Facility: HOSPITAL | Age: 73
Discharge: HOME | End: 2018-09-27
Attending: INTERNAL MEDICINE
Payer: MEDICARE

## 2018-09-27 VITALS
HEART RATE: 79 BPM | RESPIRATION RATE: 17 BRPM | BODY MASS INDEX: 38.34 KG/M2 | HEIGHT: 68 IN | OXYGEN SATURATION: 97 % | WEIGHT: 253 LBS

## 2018-09-27 DIAGNOSIS — R76.8 ELEVATED IGE LEVEL: ICD-10-CM

## 2018-09-27 DIAGNOSIS — J42 CHRONIC WHEEZY BRONCHITIS: Primary | ICD-10-CM

## 2018-09-27 DIAGNOSIS — Z77.090 ASBESTOS EXPOSURE: ICD-10-CM

## 2018-09-27 DIAGNOSIS — J42 CHRONIC WHEEZY BRONCHITIS: ICD-10-CM

## 2018-09-27 DIAGNOSIS — G47.33 OSA ON CPAP: ICD-10-CM

## 2018-09-27 DIAGNOSIS — J44.89 ASTHMA WITH COPD: ICD-10-CM

## 2018-09-27 LAB
BRPFT: ABNORMAL
DLCO ADJ PRE: 23.8 ML/(MIN*MMHG) (ref 18.27–32.12)
DLCO SINGLE BREATH LLN: 18.27
DLCO SINGLE BREATH PRE REF: 93.9 %
DLCO SINGLE BREATH REF: 25.2
DLCOC SBVA LLN: 2.52
DLCOC SBVA PRE REF: 122.1 %
DLCOC SBVA REF: 3.74
DLCOC SINGLE BREATH LLN: 18.27
DLCOC SINGLE BREATH PRE REF: 94.4 %
DLCOC SINGLE BREATH REF: 25.2
DLCOVA LLN: 2.52
DLCOVA PRE REF: 121.4 %
DLCOVA PRE: 4.54 ML/(MIN*MMHG*L) (ref 2.52–4.95)
DLCOVA REF: 3.74
DLVAADJ PRE: 4.56 ML/(MIN*MMHG*L) (ref 2.52–4.95)
ERV LLN: 0.99
ERV PRE REF: 42.3 %
ERV REF: 0.99
ERVN2 LLN: 0.99
ERVN2 REF: 0.99
FEF 25 75 CHG: 44.2 %
FEF 25 75 LLN: 0.94
FEF 25 75 POST REF: 124.9 %
FEF 25 75 PRE REF: 86.6 %
FEF 25 75 REF: 2.23
FET100 CHG: -15.7 %
FEV1 CHG: 6.1 %
FEV1 FVC CHG: 6.7 %
FEV1 FVC LLN: 62
FEV1 FVC POST REF: 104.8 %
FEV1 FVC PRE REF: 98.2 %
FEV1 FVC REF: 76
FEV1 LLN: 2.11
FEV1 POST REF: 93.6 %
FEV1 PRE REF: 88.2 %
FEV1 REF: 2.95
FEV6 CHG: 3.3 %
FEV6 LLN: 2.93
FEV6 POST REF: 88.8 %
FEV6 POST: 3.37 L (ref 2.93–4.65)
FEV6 PRE REF: 86 %
FEV6 PRE: 3.26 L (ref 2.93–4.65)
FEV6 REF: 3.79
FRCN2 LLN: 2.62
FRCN2 REF: 3.61
FRCPLETH LLN: 2.62
FRCPLETH PREREF: 86.1 %
FRCPLETH REF: 3.61
FVC CHG: -0.6 %
FVC LLN: 2.87
FVC POST REF: 88.9 %
FVC PRE REF: 89.4 %
FVC REF: 3.9
IVC PRE: 3.19 L (ref 2.87–4.92)
IVC SINGLE BREATH LLN: 2.87
IVC SINGLE BREATH PRE REF: 81.7 %
IVC SINGLE BREATH REF: 3.9
MVV LLN: 97
MVV PRE REF: 88.3 %
MVV REF: 114
PEF CHG: -10.2 %
PEF LLN: 5.54
PEF POST REF: 115 %
PEF PRE REF: 128.1 %
PEF REF: 7.73
POST FEF 25 75: 2.79 L/S (ref 0.94–3.52)
POST FET 100: 10.45 SEC
POST FEV1 FVC: 79.65 % (ref 62.22–89.75)
POST FEV1: 2.76 L (ref 2.11–3.79)
POST FVC: 3.46 L (ref 2.87–4.92)
POST PEF: 8.9 L/S (ref 5.54–9.93)
PRE DLCO: 23.66 ML/(MIN*MMHG) (ref 18.27–32.12)
PRE ERV: 0.42 L (ref 0.99–0.99)
PRE FEF 25 75: 1.93 L/S (ref 0.94–3.52)
PRE FET 100: 12.4 SEC
PRE FEV1 FVC: 74.63 % (ref 62.22–89.75)
PRE FEV1: 2.6 L (ref 2.11–3.79)
PRE FRC PL: 3.1 L
PRE FVC: 3.48 L (ref 2.87–4.92)
PRE MVV: 101 L/MIN (ref 97.27–131.61)
PRE PEF: 9.91 L/S (ref 5.54–9.93)
PRE RV: 2.67 L (ref 1.95–3.29)
PRE TLC: 6.15 L (ref 5.59–7.89)
RAW LLN: 3.06
RAW PRE REF: 88.9 %
RAW PRE: 2.72 CMH2O*S/L (ref 3.06–3.06)
RAW REF: 3.06
RV LLN: 1.95
RV PRE REF: 101.9 %
RV REF: 2.62
RVN2 LLN: 1.95
RVN2 REF: 2.62
RVN2TLCN2 LLN: 33
RVN2TLCN2 REF: 42
RVTLC LLN: 33
RVTLC PRE REF: 103.2 %
RVTLC PRE: 43.37 % (ref 33.06–51.02)
RVTLC REF: 42
TLC LLN: 5.59
TLC PRE REF: 91.3 %
TLC REF: 6.74
TLCN2 LLN: 5.59
TLCN2 REF: 6.74
VA PRE: 5.22 L (ref 6.59–6.59)
VA SINGLE BREATH LLN: 6.59
VA SINGLE BREATH PRE REF: 79.2 %
VA SINGLE BREATH REF: 6.59
VC LLN: 2.87
VC PRE REF: 89.4 %
VC PRE: 3.48 L (ref 2.87–4.92)
VC REF: 3.9
VCMAXN2 LLN: 2.87
VCMAXN2 REF: 3.9
VTGRAWPRE: 3.54 L

## 2018-09-27 PROCEDURE — 71046 X-RAY EXAM CHEST 2 VIEWS: CPT | Mod: 26,,, | Performed by: RADIOLOGY

## 2018-09-27 PROCEDURE — 94060 EVALUATION OF WHEEZING: CPT | Mod: PBBFAC,PO

## 2018-09-27 PROCEDURE — 94726 PLETHYSMOGRAPHY LUNG VOLUMES: CPT | Mod: PBBFAC,PO

## 2018-09-27 PROCEDURE — 1101F PT FALLS ASSESS-DOCD LE1/YR: CPT | Mod: CPTII,,, | Performed by: INTERNAL MEDICINE

## 2018-09-27 PROCEDURE — 94060 EVALUATION OF WHEEZING: CPT | Mod: 26,S$PBB,, | Performed by: INTERNAL MEDICINE

## 2018-09-27 PROCEDURE — 99214 OFFICE O/P EST MOD 30 MIN: CPT | Mod: 25,S$PBB,, | Performed by: INTERNAL MEDICINE

## 2018-09-27 PROCEDURE — 94726 PLETHYSMOGRAPHY LUNG VOLUMES: CPT | Mod: 26,S$PBB,, | Performed by: INTERNAL MEDICINE

## 2018-09-27 PROCEDURE — 99999 PR PBB SHADOW E&M-EST. PATIENT-LVL III: CPT | Mod: PBBFAC,,, | Performed by: INTERNAL MEDICINE

## 2018-09-27 PROCEDURE — 94729 DIFFUSING CAPACITY: CPT | Mod: PBBFAC,PO

## 2018-09-27 PROCEDURE — 99213 OFFICE O/P EST LOW 20 MIN: CPT | Mod: PBBFAC,25,PO | Performed by: INTERNAL MEDICINE

## 2018-09-27 PROCEDURE — 71046 X-RAY EXAM CHEST 2 VIEWS: CPT | Mod: TC,FY,PO

## 2018-09-27 PROCEDURE — 94729 DIFFUSING CAPACITY: CPT | Mod: 26,S$PBB,, | Performed by: INTERNAL MEDICINE

## 2018-09-27 NOTE — PROGRESS NOTES
"Subjective:       Patient ID: Erendira Pretty is a 73 y.o. male.    Chief Complaint: Copd with Asthma    Mr Maria De Jesus Krishna is 73 years old  I have reviewed the patient's medical history in detail and updated the computerized patient record.  Asthma /DELONTE on CPAP/Obesity by BMI  Seen previously seen with Asthmatic wheezy bronchitis  Spirometry normal, TLC and DLCO normal  Reports CPAP adherence and benefits  CAT score 21, ACT score 20  Distant smoking Hx, Hx asbestos exposure, CXR 2014 okay  Still frustrated by copius sputum  IGE was 4533  No fever, No hemoptysis, No mold exposure  Complaint with meds  Worked with [facility]  Hx of polyp surgery: some eosino's seen  Seen Dr Ramsay, told not candidate for shots  Discussed: need updated PFt, check eosion's and IGE      Review of Systems   Constitutional: Positive for fatigue.   Eyes: Negative.    Respiratory: Negative for cough (morning cough and sputum), shortness of breath and wheezing.    Cardiovascular: Negative for leg swelling.   Genitourinary: Negative.    Endocrine: endocrine negative   Musculoskeletal: Negative.    Skin: Negative.    Gastrointestinal: Negative.    Neurological: Negative.    Psychiatric/Behavioral: Negative.        Objective:       Vitals:    09/27/18 1241   Pulse: 79   Resp: 17   SpO2: 97%   Weight: 114.8 kg (253 lb)   Height: 5' 8" (1.727 m)     Physical Exam   Constitutional: He is oriented to person, place, and time. Vital signs are normal. He appears well-developed and well-nourished. He is obese.   HENT:   Head: Normocephalic.   Nose: Nose normal.   Mouth/Throat: Oropharynx is clear and moist. Mallampati Score: III.   Neck: Normal range of motion. Neck supple.   Cardiovascular: Normal rate and regular rhythm.   Murmur heard.  Pulmonary/Chest: Normal expansion. He has no decreased breath sounds. He has no rhonchi. He has no rales.   Abdominal: Soft. Bowel sounds are normal.   Musculoskeletal: Normal range of motion. He exhibits no edema. "   Lymphadenopathy:     He has no cervical adenopathy.   Neurological: He is alert and oriented to person, place, and time. He has normal reflexes.   Skin: Skin is warm and dry.   Psychiatric: He has a normal mood and affect.   Nursing note and vitals reviewed.    Personal Diagnostic Review  Echocardiogram: reviewed: Moderate aortic stenosis    Pulmonary function tests: FEV1: 2.60  (88% predicted), FVC:  3.48 (89 % predicted), FEV1/FVC:  75  TLC 6.15( 91%)  DLCO 23.66( 93%)    IgE 0 - 100 IU/mL 4533 Abnormally high     MADAY and ANCA normal    Pathology  FINAL PATHOLOGIC DIAGNOSIS  SOFT TISSUE, LEFT NASAL CAVITY (EXCISION):  Benign respiratory mucosa with abundant eosinophils (up to 80 per 1 high-power field)      No flowsheet data found.      Assessment:       1. Chronic wheezy bronchitis    2. Elevated IgE level    3. DELONTE on CPAP    4. Asbestos exposure        Outpatient Encounter Medications as of 9/27/2018   Medication Sig Dispense Refill    albuterol-ipratropium (DUO-NEB) 2.5 mg-0.5 mg/3 mL nebulizer solution Take 3 mLs by nebulization 2 (two) times daily. Rescue 120 vial 5    amlodipine-valsartan (EXFORGE)  mg per tablet Take 1 tablet by mouth once daily. 90 tablet 3    blood sugar diagnostic (BLOOD GLUCOSE TEST) Strp 1 strip by Misc.(Non-Drug; Combo Route) route 3 (three) times daily. accu-chek lux plus 100 strip 11    budesonide-formoterol 160-4.5 mcg (SYMBICORT) 160-4.5 mcg/actuation HFAA INHALE 2 PUFFS INTO THE LUNGS TWO TIMES A DAY (Patient taking differently: Inhale 2 puffs into the lungs every 12 (twelve) hours. INHALE 2 PUFFS INTO THE LUNGS TWO TIMES A DAY) 10.2 g 11    cycloSPORINE (RESTASIS) 0.05 % ophthalmic emulsion Place 0.4 mLs (1 drop total) into both eyes 2 (two) times daily. 60 each 11    docusate sodium (COLACE) 100 MG capsule Take 1 capsule (100 mg total) by mouth 2 (two) times daily. 60 capsule 0    ezetimibe (ZETIA) 10 mg tablet TAKE ONE BY MOUTH EVERY DAY. 90 tablet 3     "finasteride (PROSCAR) 5 mg tablet Take 1 tablet (5 mg total) by mouth once daily. 30 tablet 11    GLUCOSAMINE HCL/CHONDR ROSARIO A NA (OSTEO BI-FLEX ORAL) Take 1 tablet by mouth once daily.      HUMALOG KWIKPEN 100 unit/mL InPn pen INJECT 3-4 UNITS INTO THE SKIN THREE TIMES DAILY BEFORE MEALS. (Patient taking differently: Currently on 12 units hs) 3 mL 11    hydroCHLOROthiazide (HYDRODIURIL) 12.5 MG Tab TAKE 1 TABLET BY MOUTH EVERY DAY. 30 tablet 0    insulin glargine (LANTUS SOLOSTAR) 100 unit/mL (3 mL) InPn pen Inject 40 Units into the skin once daily. 5 Syringe 6    lancets (ACCU-CHEK FASTCLIX) Misc Test 2-3 times daily 200 each 3    levocetirizine (XYZAL) 5 MG tablet TAKE ONE BY MOUTH EVERY EVENING. 30 tablet 11    metoprolol succinate (TOPROL-XL) 50 MG 24 hr tablet TAKE ONE BY MOUTH TWO TIMES A DAY. (Patient taking differently: 1/2 a tablet qday) 60 tablet 11    mometasone (NASONEX) 50 mcg/actuation nasal spray 2 sprays by Nasal route once daily. (Patient taking differently: 2 sprays by Nasal route daily as needed. ) 17 g 12    montelukast (SINGULAIR) 10 mg tablet TAKE ONE TABLET BY MOUTH EVERY DAY. 30 tablet 11    MV,CA,IRON,MIN/FA/PHYTOSTEROL (CENTRUM SPECIALIST HEART ORAL) Take 1 tablet by mouth once daily.       OMEGA-3 FATTY ACIDS/DHA/EPA (MEGARED PLANT-OMEGA-3 ORAL) Take 1 capsule by mouth once daily.      oxyCODONE-acetaminophen (PERCOCET) 5-325 mg per tablet Take 1-2 tablets by mouth every 4 (four) hours as needed for Pain. 30 tablet 0    pen needle, diabetic (BD INSULIN PEN NEEDLE UF MINI) 31 gauge x 3/16" Ndle USE AS DIRECTED 100 each 11    PROAIR HFA 90 mcg/actuation inhaler Inhale 2 puffs into the lungs every 6 (six) hours. 8.5 g 12    RABEprazole (ACIPHEX) 20 mg tablet TAKE 1 TABLET BY MOUTH TWO TIMES A DAY 60 tablet 11    RESTASIS 0.05 % ophthalmic emulsion PLACE 1 DROP IN BOTH EYES TWO TIMES A DAY 60 each 0    saw palmetto 80 MG capsule Take 450 mg by mouth 2 (two) times daily.       " sildenafil (REVATIO) 20 mg Tab Take 2 tablets one hour prior to intercourse. Max two per 24 hours 90 tablet 11    SYNTHROID 137 mcg Tab tablet TAKE 2 TABLETS BY MOUTH BEFORE BREAKFAST. 60 tablet 5    tobramycin sulfate 0.3% (TOBREX) 0.3 % ophthalmic ointment Apply to right lower eyelid 4 times daily for 5 days. 1 Tube 0    triamcinolone acetonide 0.025% (KENALOG) 0.025 % cream APPLY TO RASH TWO TIMES A DAY  6    UNABLE TO FIND medication name: Milk thisRamamiaclive       Facility-Administered Encounter Medications as of 9/27/2018   Medication Dose Route Frequency Provider Last Rate Last Dose    dexamethasone injection 8 mg  8 mg Intramuscular Once Alejandro Parkinson MD         Orders Placed This Encounter   Procedures    CT Chest Without Contrast     Standing Status:   Future     Standing Expiration Date:   9/27/2019     Scheduling Instructions:      HIGH RESOLUTION     Order Specific Question:   May the Radiologist modify the order per protocol to meet the clinical needs of the patient?     Answer:   Yes    ALLERGEN ASPERGILLUS FUMAGATUS     Standing Status:   Future     Standing Expiration Date:   11/26/2019    C3 COMPLEMENT     Standing Status:   Future     Standing Expiration Date:   11/26/2019    C4 COMPLEMENT     Standing Status:   Future     Standing Expiration Date:   11/26/2019     Plan:       Continue Symbicort: Vortex spacer added  Adherence with spacer  chest CT  Continue CPAP  Will obtain records from allergist.    Follow-up in about 2 months (around 11/27/2018) for Chest CT: for mass/infiltrate/GGO, Labs today.    This note was prepared using voice recognition system and is likely to have sound alike errors that may have been overlooked even after proof reading.  Please call me with any questions    Discussed diagnosis, its evaluation, treatment and usual course. All questions answered.    Thank you for the courtesy of participating in the care of this patient    Rishi Molina MD

## 2018-09-28 ENCOUNTER — HOSPITAL ENCOUNTER (OUTPATIENT)
Dept: RADIOLOGY | Facility: HOSPITAL | Age: 73
Discharge: HOME | End: 2018-09-28
Attending: INTERNAL MEDICINE
Payer: MEDICARE

## 2018-09-28 DIAGNOSIS — G47.33 OSA ON CPAP: ICD-10-CM

## 2018-09-28 DIAGNOSIS — J42 CHRONIC WHEEZY BRONCHITIS: ICD-10-CM

## 2018-09-28 DIAGNOSIS — Z77.090 ASBESTOS EXPOSURE: ICD-10-CM

## 2018-09-28 DIAGNOSIS — R76.8 ELEVATED IGE LEVEL: ICD-10-CM

## 2018-09-28 PROCEDURE — 71250 CT THORAX DX C-: CPT | Mod: TC,PO

## 2018-09-28 PROCEDURE — 71250 CT THORAX DX C-: CPT | Mod: 26,,, | Performed by: RADIOLOGY

## 2018-10-11 RX ORDER — SILDENAFIL CITRATE 20 MG/1
TABLET ORAL
Qty: 90 TABLET | Refills: 5 | Status: SHIPPED | OUTPATIENT
Start: 2018-10-11 | End: 2018-11-12

## 2018-10-16 ENCOUNTER — TELEPHONE (OUTPATIENT)
Dept: CARDIOLOGY | Facility: CLINIC | Age: 73
End: 2018-10-16

## 2018-10-16 ENCOUNTER — TELEPHONE (OUTPATIENT)
Dept: URGENT CARE | Facility: CLINIC | Age: 73
End: 2018-10-16

## 2018-10-16 DIAGNOSIS — Z79.899 ENCOUNTER FOR LONG-TERM CURRENT USE OF MEDICATION: Primary | ICD-10-CM

## 2018-10-16 DIAGNOSIS — I25.10 CORONARY ARTERY DISEASE INVOLVING NATIVE CORONARY ARTERY OF NATIVE HEART WITHOUT ANGINA PECTORIS: ICD-10-CM

## 2018-10-16 NOTE — TELEPHONE ENCOUNTER
I called and spoke with pt and scheduled labs for this thur , fasting. Cm    ----- Message from Lilli Reardon LPN sent at 10/16/2018  9:22 AM CDT -----  Contact: wife      ----- Message -----  From: Vannessa May  Sent: 10/16/2018   8:21 AM  To: Sami Brady Staff    The pt wants to schedule a lab appt, no orders in the system, the pt can be reached at [phone]///thxMW

## 2018-10-16 NOTE — TELEPHONE ENCOUNTER
Advised patient message was sent to urgent care in error, will be forwarded to Dr. Gallardo ( Cardiology )      ----- Message from Vannessa May sent at 10/16/2018  8:21 AM CDT -----  Contact: wife  The pt wants to schedule a lab appt, no orders in the system, the pt can be reached at [phone]///thxMW

## 2018-10-18 ENCOUNTER — LAB VISIT (OUTPATIENT)
Dept: LAB | Facility: HOSPITAL | Age: 73
End: 2018-10-18
Attending: INTERNAL MEDICINE
Payer: MEDICARE

## 2018-10-18 DIAGNOSIS — Z79.899 ENCOUNTER FOR LONG-TERM CURRENT USE OF MEDICATION: ICD-10-CM

## 2018-10-18 DIAGNOSIS — I25.10 CORONARY ARTERY DISEASE INVOLVING NATIVE CORONARY ARTERY OF NATIVE HEART WITHOUT ANGINA PECTORIS: ICD-10-CM

## 2018-10-18 LAB
ALBUMIN SERPL BCP-MCNC: 3.7 G/DL
ALP SERPL-CCNC: 75 U/L
ALT SERPL W/O P-5'-P-CCNC: 33 U/L
AST SERPL-CCNC: 33 U/L
BILIRUB DIRECT SERPL-MCNC: 0.3 MG/DL
BILIRUB SERPL-MCNC: 0.7 MG/DL
CHOLEST SERPL-MCNC: 189 MG/DL
CHOLEST/HDLC SERPL: 3.6 {RATIO}
HDLC SERPL-MCNC: 52 MG/DL
HDLC SERPL: 27.5 %
LDLC SERPL CALC-MCNC: 97.2 MG/DL
NONHDLC SERPL-MCNC: 137 MG/DL
PROT SERPL-MCNC: 7.8 G/DL
TRIGL SERPL-MCNC: 199 MG/DL

## 2018-10-18 PROCEDURE — 80076 HEPATIC FUNCTION PANEL: CPT

## 2018-10-18 PROCEDURE — 36415 COLL VENOUS BLD VENIPUNCTURE: CPT | Mod: PO

## 2018-10-18 PROCEDURE — 80061 LIPID PANEL: CPT

## 2018-10-19 ENCOUNTER — TELEPHONE (OUTPATIENT)
Dept: CARDIOLOGY | Facility: CLINIC | Age: 73
End: 2018-10-19

## 2018-10-19 NOTE — TELEPHONE ENCOUNTER
Pt notified and will restart niacin-flush free at 500 mg a day. Pt to f/u with Dr Gallardo as scheduled.cm  ----- Message from Frank Gallardo MD sent at 10/19/2018  6:29 AM CDT -----  Lipids reveiwed, LDL is at goal, TG still high, add niacin  mg flush free 1 tablet per day

## 2018-10-24 RX ORDER — HYDROCHLOROTHIAZIDE 12.5 MG/1
TABLET ORAL
Qty: 30 TABLET | Refills: 0 | Status: SHIPPED | OUTPATIENT
Start: 2018-10-24 | End: 2018-10-25 | Stop reason: SDUPTHER

## 2018-10-25 ENCOUNTER — OFFICE VISIT (OUTPATIENT)
Dept: CARDIOLOGY | Facility: CLINIC | Age: 73
End: 2018-10-25
Payer: MEDICARE

## 2018-10-25 VITALS
SYSTOLIC BLOOD PRESSURE: 142 MMHG | DIASTOLIC BLOOD PRESSURE: 76 MMHG | HEIGHT: 68 IN | WEIGHT: 252.88 LBS | HEART RATE: 72 BPM | BODY MASS INDEX: 38.32 KG/M2

## 2018-10-25 DIAGNOSIS — J44.89 ASTHMA WITH COPD: Primary | ICD-10-CM

## 2018-10-25 DIAGNOSIS — G47.33 OSA ON CPAP: ICD-10-CM

## 2018-10-25 DIAGNOSIS — I73.9 PVD (PERIPHERAL VASCULAR DISEASE): ICD-10-CM

## 2018-10-25 DIAGNOSIS — I25.10 CORONARY ARTERY DISEASE INVOLVING NATIVE CORONARY ARTERY OF NATIVE HEART WITHOUT ANGINA PECTORIS: Primary | ICD-10-CM

## 2018-10-25 DIAGNOSIS — I25.10 CORONARY ARTERY DISEASE INVOLVING NATIVE CORONARY ARTERY OF NATIVE HEART WITHOUT ANGINA PECTORIS: ICD-10-CM

## 2018-10-25 DIAGNOSIS — I10 ESSENTIAL HYPERTENSION: ICD-10-CM

## 2018-10-25 DIAGNOSIS — E11.9 TYPE 2 DIABETES MELLITUS WITHOUT COMPLICATION, WITHOUT LONG-TERM CURRENT USE OF INSULIN: ICD-10-CM

## 2018-10-25 DIAGNOSIS — I35.0 NONRHEUMATIC AORTIC VALVE STENOSIS: ICD-10-CM

## 2018-10-25 PROCEDURE — 99213 OFFICE O/P EST LOW 20 MIN: CPT | Mod: PBBFAC,PO | Performed by: INTERNAL MEDICINE

## 2018-10-25 PROCEDURE — 3077F SYST BP >= 140 MM HG: CPT | Mod: CPTII,,, | Performed by: INTERNAL MEDICINE

## 2018-10-25 PROCEDURE — 99999 PR PBB SHADOW E&M-EST. PATIENT-LVL III: CPT | Mod: PBBFAC,,, | Performed by: INTERNAL MEDICINE

## 2018-10-25 PROCEDURE — 99215 OFFICE O/P EST HI 40 MIN: CPT | Mod: S$PBB,,, | Performed by: INTERNAL MEDICINE

## 2018-10-25 PROCEDURE — 1101F PT FALLS ASSESS-DOCD LE1/YR: CPT | Mod: CPTII,,, | Performed by: INTERNAL MEDICINE

## 2018-10-25 PROCEDURE — 3044F HG A1C LEVEL LT 7.0%: CPT | Mod: CPTII,,, | Performed by: INTERNAL MEDICINE

## 2018-10-25 PROCEDURE — 3078F DIAST BP <80 MM HG: CPT | Mod: CPTII,,, | Performed by: INTERNAL MEDICINE

## 2018-10-25 RX ORDER — HYDROCHLOROTHIAZIDE 12.5 MG/1
12.5 TABLET ORAL DAILY
Qty: 30 TABLET | Refills: 11 | Status: ON HOLD | OUTPATIENT
Start: 2018-10-25 | End: 2018-11-08 | Stop reason: HOSPADM

## 2018-10-25 NOTE — PROGRESS NOTES
Subjective:   Patient ID:  Erendira Pretty is a 73 y.o. male who presents for follow-up of Coronary Artery Disease; Hypertension; Chest Pain; and Shortness of Breath  Pt worsening SOB.Pt denies CP.NMT (-) 9-17.    Hypertension   This is a chronic problem. The current episode started more than 1 year ago. The problem has been gradually improving since onset. The problem is controlled. Pertinent negatives include no chest pain, palpitations or shortness of breath. Past treatments include angiotensin blockers, calcium channel blockers, beta blockers and diuretics. The current treatment provides moderate improvement. There are no compliance problems.    Hyperlipidemia   This is a chronic problem. The current episode started more than 1 year ago. The problem is controlled. Recent lipid tests were reviewed and are variable. Factors aggravating his hyperlipidemia include beta blockers. Pertinent negatives include no chest pain or shortness of breath. Current antihyperlipidemic treatment includes statins and ezetimibe. The current treatment provides moderate improvement of lipids. Compliance problems include medication side effects.    Coronary Artery Disease   Presents for follow-up visit. Pertinent negatives include no chest pain, chest pressure, chest tightness, dizziness, leg swelling, muscle weakness, palpitations, shortness of breath or weight gain. Risk factors include hyperlipidemia. The symptoms have been stable. Compliance with diet is variable. Compliance with exercise is variable. Compliance with medications is good.       Review of Systems   Constitution: Negative. Negative for weight gain.   HENT: Negative.    Eyes: Negative.    Cardiovascular: Negative.  Negative for chest pain, leg swelling and palpitations.   Respiratory: Negative.  Negative for chest tightness and shortness of breath.    Endocrine: Negative.    Hematologic/Lymphatic: Negative.    Skin: Negative.    Musculoskeletal: Negative for muscle  weakness.   Gastrointestinal: Negative.    Genitourinary: Negative.    Neurological: Negative.  Negative for dizziness.   Psychiatric/Behavioral: Negative.    Allergic/Immunologic: Negative.      Family History   Problem Relation Age of Onset    Heart disease Mother     Heart disease Father     Heart disease Brother     Colon cancer Neg Hx      Past Medical History:   Diagnosis Date    Aortic stenosis     Asthma     BPH (benign prostatic hyperplasia)     CAD (coronary artery disease)     40% lesion 2002;[facility]    Cirrhosis     Claudication 4/9/2014    Colon polyp     ED (erectile dysfunction)     Encounter for blood transfusion     Ex-smoker     Hearing loss NEC     Hepatitis C     Cured;reji brown 2015    Hyperlipidemia     Hypertension     Hypothyroid     s/p tx graves    DELONTE on CPAP     Osteoarthritis     PVD (peripheral vascular disease)     Type 2 diabetes mellitus      Current Outpatient Medications on File Prior to Visit   Medication Sig Dispense Refill    albuterol-ipratropium (DUO-NEB) 2.5 mg-0.5 mg/3 mL nebulizer solution Take 3 mLs by nebulization 2 (two) times daily. Rescue 120 vial 5    amlodipine-valsartan (EXFORGE)  mg per tablet Take 1 tablet by mouth once daily. 90 tablet 3    budesonide-formoterol 160-4.5 mcg (SYMBICORT) 160-4.5 mcg/actuation HFAA INHALE 2 PUFFS INTO THE LUNGS TWO TIMES A DAY (Patient taking differently: Inhale 2 puffs into the lungs every 12 (twelve) hours. INHALE 2 PUFFS INTO THE LUNGS TWO TIMES A DAY) 10.2 g 11    ezetimibe (ZETIA) 10 mg tablet TAKE ONE BY MOUTH EVERY DAY. 90 tablet 3    finasteride (PROSCAR) 5 mg tablet Take 1 tablet (5 mg total) by mouth once daily. 30 tablet 11    GLUCOSAMINE HCL/CHONDR ROSARIO A NA (OSTEO BI-FLEX ORAL) Take 1 tablet by mouth once daily.      HUMALOG KWIKPEN 100 unit/mL InPn pen INJECT 3-4 UNITS INTO THE SKIN THREE TIMES DAILY BEFORE MEALS. (Patient taking differently: Currently on 12 units hs) 3 mL 11     hydroCHLOROthiazide (HYDRODIURIL) 12.5 MG Tab TAKE 1 TABLET BY MOUTH EVERY DAY. 30 tablet 0    insulin glargine (LANTUS SOLOSTAR) 100 unit/mL (3 mL) InPn pen Inject 40 Units into the skin once daily. 5 Syringe 6    levocetirizine (XYZAL) 5 MG tablet TAKE ONE BY MOUTH EVERY EVENING. 30 tablet 11    metoprolol succinate (TOPROL-XL) 50 MG 24 hr tablet TAKE ONE BY MOUTH TWO TIMES A DAY. (Patient taking differently: 1/2 a tablet qday) 60 tablet 11    mometasone (NASONEX) 50 mcg/actuation nasal spray 2 sprays by Nasal route once daily. (Patient taking differently: 2 sprays by Nasal route daily as needed. ) 17 g 12    montelukast (SINGULAIR) 10 mg tablet TAKE ONE TABLET BY MOUTH EVERY DAY. 30 tablet 11    MV,CA,IRON,MIN/FA/PHYTOSTEROL (CENTRUM SPECIALIST HEART ORAL) Take 1 tablet by mouth once daily.       niacin, inositol niacinate, (NIACIN FLUSH FREE ORAL) Take 500 mg by mouth once daily.      OMEGA-3 FATTY ACIDS/DHA/EPA (MEGARED PLANT-OMEGA-3 ORAL) Take 1 capsule by mouth once daily.      PROAIR HFA 90 mcg/actuation inhaler Inhale 2 puffs into the lungs every 6 (six) hours. 8.5 g 12    RABEprazole (ACIPHEX) 20 mg tablet TAKE 1 TABLET BY MOUTH TWO TIMES A DAY 60 tablet 11    RESTASIS 0.05 % ophthalmic emulsion PLACE 1 DROP IN BOTH EYES TWO TIMES A DAY 60 each 0    saw palmetto 80 MG capsule Take 450 mg by mouth 2 (two) times daily.       sildenafil (REVATIO) 20 mg Tab Take 2 tablets by mouth one hour prior to intercourse. Max two per 24 hours 90 tablet 5    SYNTHROID 137 mcg Tab tablet TAKE 2 TABLETS BY MOUTH BEFORE BREAKFAST. 60 tablet 5    tobramycin sulfate 0.3% (TOBREX) 0.3 % ophthalmic ointment Apply to right lower eyelid 4 times daily for 5 days. 1 Tube 0    triamcinolone acetonide 0.025% (KENALOG) 0.025 % cream APPLY TO RASH TWO TIMES A DAY  6    UNABLE TO FIND medication name: Milk thislte      blood sugar diagnostic (BLOOD GLUCOSE TEST) Strp 1 strip by Misc.(Non-Drug; Combo Route) route 3  "(three) times daily. accu-chek lux plus 100 strip 11    docusate sodium (COLACE) 100 MG capsule Take 1 capsule (100 mg total) by mouth 2 (two) times daily. 60 capsule 0    lancets (ACCU-CHEK FASTCLIX) Misc Test 2-3 times daily 200 each 3    oxyCODONE-acetaminophen (PERCOCET) 5-325 mg per tablet Take 1-2 tablets by mouth every 4 (four) hours as needed for Pain. 30 tablet 0    pen needle, diabetic (BD INSULIN PEN NEEDLE UF MINI) 31 gauge x 3/16" Ndle USE AS DIRECTED 100 each 11     Current Facility-Administered Medications on File Prior to Visit   Medication Dose Route Frequency Provider Last Rate Last Dose    dexamethasone injection 8 mg  8 mg Intramuscular Once Alejandro Parkinson MD         Review of patient's allergies indicates:   Allergen Reactions    Iodinated contrast- oral and iv dye Hives     Tachycardia    Omeprazole Hives and Itching    Prilosec [omeprazole magnesium] Hives and Itching       Objective:     Physical Exam   Constitutional: He is oriented to person, place, and time. He appears well-developed and well-nourished.   HENT:   Head: Normocephalic and atraumatic.   Eyes: Conjunctivae are normal. Pupils are equal, round, and reactive to light.   Neck: Normal range of motion. Neck supple.   Cardiovascular: Normal rate, regular rhythm, normal heart sounds and intact distal pulses.   Pulmonary/Chest: Effort normal and breath sounds normal.   Abdominal: Soft. Bowel sounds are normal.   Neurological: He is alert and oriented to person, place, and time.   Skin: Skin is warm and dry.   Psychiatric: He has a normal mood and affect.   Nursing note and vitals reviewed.      Assessment:     1. Asthma with COPD    2. Essential hypertension    3. Coronary artery disease involving native coronary artery of native heart without angina pectoris    4. PVD (peripheral vascular disease)    5. Nonrheumatic aortic valve stenosis    6. DELONTE on CPAP    7. Type 2 diabetes mellitus without complication, without long-term " current use of insulin        Plan:     Asthma with COPD    Essential hypertension    Coronary artery disease involving native coronary artery of native heart without angina pectoris    PVD (peripheral vascular disease)    Nonrheumatic aortic valve stenosis    DELONTE on CPAP    Type 2 diabetes mellitus without complication, without long-term current use of insulin      LHC/RHC  Echo  Continue exforge, metoprolol, hctz-htn  Continue zetia-hlp  Continue asa- cad

## 2018-10-30 ENCOUNTER — TELEPHONE (OUTPATIENT)
Dept: CARDIOLOGY | Facility: CLINIC | Age: 73
End: 2018-10-30

## 2018-10-30 DIAGNOSIS — R07.9 CHEST PAIN, MODERATE CORONARY ARTERY RISK: ICD-10-CM

## 2018-10-30 DIAGNOSIS — I49.3 PVC (PREMATURE VENTRICULAR CONTRACTION): ICD-10-CM

## 2018-10-30 DIAGNOSIS — I35.0 NONRHEUMATIC AORTIC VALVE STENOSIS: ICD-10-CM

## 2018-10-30 DIAGNOSIS — I25.10 CORONARY ARTERY DISEASE INVOLVING NATIVE CORONARY ARTERY OF NATIVE HEART WITHOUT ANGINA PECTORIS: Primary | ICD-10-CM

## 2018-10-30 DIAGNOSIS — I85.00 ESOPHAGEAL VARICES WITHOUT BLEEDING, UNSPECIFIED ESOPHAGEAL VARICES TYPE: ICD-10-CM

## 2018-10-30 RX ORDER — DIPHENHYDRAMINE HCL 50 MG
50 CAPSULE ORAL EVERY 8 HOURS
Qty: 3 CAPSULE | Refills: 0 | COMMUNITY
Start: 2018-10-30 | End: 2018-11-12

## 2018-10-30 RX ORDER — PREDNISONE 20 MG/1
20 TABLET ORAL EVERY 8 HOURS
Qty: 3 TABLET | Refills: 0 | Status: ON HOLD | OUTPATIENT
Start: 2018-10-30 | End: 2018-11-08 | Stop reason: HOSPADM

## 2018-10-30 RX ORDER — FAMOTIDINE 40 MG/1
40 TABLET, FILM COATED ORAL
COMMUNITY
End: 2018-10-30 | Stop reason: SDUPTHER

## 2018-10-30 RX ORDER — FAMOTIDINE 40 MG/1
40 TABLET, FILM COATED ORAL EVERY 8 HOURS
Qty: 3 TABLET | Refills: 0 | Status: ON HOLD | OUTPATIENT
Start: 2018-10-30 | End: 2018-11-08 | Stop reason: HOSPADM

## 2018-10-30 RX ORDER — PREDNISONE 20 MG/1
20 TABLET ORAL
COMMUNITY
End: 2018-10-30 | Stop reason: SDUPTHER

## 2018-10-30 RX ORDER — DIPHENHYDRAMINE HCL 50 MG
50 CAPSULE ORAL EVERY 8 HOURS
COMMUNITY
End: 2018-10-30 | Stop reason: SDUPTHER

## 2018-10-30 NOTE — TELEPHONE ENCOUNTER
I called and rev'd all the instructions for his cath and reviewed all meds as well as the allergy prep. She will call me back if she has any more questions. laurie

## 2018-10-31 ENCOUNTER — LAB VISIT (OUTPATIENT)
Dept: LAB | Facility: HOSPITAL | Age: 73
End: 2018-10-31
Attending: INTERNAL MEDICINE
Payer: MEDICARE

## 2018-10-31 DIAGNOSIS — I49.3 PVC (PREMATURE VENTRICULAR CONTRACTION): ICD-10-CM

## 2018-10-31 DIAGNOSIS — I85.00 ESOPHAGEAL VARICES WITHOUT BLEEDING, UNSPECIFIED ESOPHAGEAL VARICES TYPE: ICD-10-CM

## 2018-10-31 DIAGNOSIS — R07.9 CHEST PAIN, MODERATE CORONARY ARTERY RISK: ICD-10-CM

## 2018-10-31 DIAGNOSIS — I35.0 NONRHEUMATIC AORTIC VALVE STENOSIS: ICD-10-CM

## 2018-10-31 DIAGNOSIS — I25.10 CORONARY ARTERY DISEASE INVOLVING NATIVE CORONARY ARTERY OF NATIVE HEART WITHOUT ANGINA PECTORIS: ICD-10-CM

## 2018-10-31 LAB
ANION GAP SERPL CALC-SCNC: 8 MMOL/L
AORTIC VALVE STENOSIS: ABNORMAL
APTT BLDCRRT: 26.4 SEC
BUN SERPL-MCNC: 15 MG/DL
CALCIUM SERPL-MCNC: 9.5 MG/DL
CHLORIDE SERPL-SCNC: 103 MMOL/L
CO2 SERPL-SCNC: 28 MMOL/L
CREAT SERPL-MCNC: 1.1 MG/DL
DIASTOLIC DYSFUNCTION: NO
ERYTHROCYTE [DISTWIDTH] IN BLOOD BY AUTOMATED COUNT: 12.7 %
EST. GFR  (AFRICAN AMERICAN): >60 ML/MIN/1.73 M^2
EST. GFR  (NON AFRICAN AMERICAN): >60 ML/MIN/1.73 M^2
ESTIMATED PA SYSTOLIC PRESSURE: 27.04
GLUCOSE SERPL-MCNC: 123 MG/DL
HCT VFR BLD AUTO: 39.1 %
HGB BLD-MCNC: 14 G/DL
INR PPP: 1.1
MCH RBC QN AUTO: 33.7 PG
MCHC RBC AUTO-ENTMCNC: 35.8 G/DL
MCV RBC AUTO: 94 FL
MITRAL VALVE MOBILITY: NORMAL
PLATELET # BLD AUTO: 134 K/UL
PMV BLD AUTO: 10.7 FL
POTASSIUM SERPL-SCNC: 4.2 MMOL/L
PROTHROMBIN TIME: 11 SEC
RBC # BLD AUTO: 4.15 M/UL
RETIRED EF AND QEF - SEE NOTES: 55 (ref 55–65)
SODIUM SERPL-SCNC: 139 MMOL/L
TRICUSPID VALVE REGURGITATION: ABNORMAL
WBC # BLD AUTO: 5.81 K/UL

## 2018-10-31 PROCEDURE — 85027 COMPLETE CBC AUTOMATED: CPT

## 2018-10-31 PROCEDURE — 85610 PROTHROMBIN TIME: CPT | Mod: PO

## 2018-10-31 PROCEDURE — 80048 BASIC METABOLIC PNL TOTAL CA: CPT

## 2018-10-31 PROCEDURE — 85730 THROMBOPLASTIN TIME PARTIAL: CPT | Mod: PO

## 2018-10-31 PROCEDURE — 36415 COLL VENOUS BLD VENIPUNCTURE: CPT | Mod: PO

## 2018-11-01 ENCOUNTER — TELEPHONE (OUTPATIENT)
Dept: CARDIOLOGY | Facility: CLINIC | Age: 73
End: 2018-11-01

## 2018-11-01 NOTE — TELEPHONE ENCOUNTER
11/01 I called and rev'd echo and to go forward with procedure tomorrow. Made follow up appt for site check and 2 mo with Dr Gallardo. Told him to ask for a print of his follow up appts since he was driving. He agreed. Cm    ----- Message from Frank Gallardo MD sent at 10/31/2018  6:54 PM CDT -----  Please tell pt echo with normal EF, mild aortic stenosis, continue current meds

## 2018-11-02 ENCOUNTER — HOSPITAL ENCOUNTER (INPATIENT)
Facility: HOSPITAL | Age: 73
LOS: 3 days | Discharge: HOME HEALTH | DRG: 234 | End: 2018-11-08
Attending: INTERNAL MEDICINE | Admitting: THORACIC SURGERY (CARDIOTHORACIC VASCULAR SURGERY)
Payer: MEDICARE

## 2018-11-02 DIAGNOSIS — R06.02 SHORTNESS OF BREATH: ICD-10-CM

## 2018-11-02 DIAGNOSIS — Z98.890 POST-OPERATIVE STATE: ICD-10-CM

## 2018-11-02 DIAGNOSIS — E11.9 TYPE 2 DIABETES MELLITUS WITHOUT COMPLICATION, WITHOUT LONG-TERM CURRENT USE OF INSULIN: ICD-10-CM

## 2018-11-02 DIAGNOSIS — G47.33 OSA ON CPAP: Chronic | ICD-10-CM

## 2018-11-02 DIAGNOSIS — D62 ACUTE BLOOD LOSS ANEMIA: ICD-10-CM

## 2018-11-02 DIAGNOSIS — E66.9 OBESITY, UNSPECIFIED CLASSIFICATION, UNSPECIFIED OBESITY TYPE, UNSPECIFIED WHETHER SERIOUS COMORBIDITY PRESENT: Chronic | ICD-10-CM

## 2018-11-02 DIAGNOSIS — I25.10 CAD (CORONARY ARTERY DISEASE): ICD-10-CM

## 2018-11-02 DIAGNOSIS — I25.10 CORONARY ARTERY DISEASE, ANGINA PRESENCE UNSPECIFIED, UNSPECIFIED VESSEL OR LESION TYPE, UNSPECIFIED WHETHER NATIVE OR TRANSPLANTED HEART: ICD-10-CM

## 2018-11-02 DIAGNOSIS — I20.9 ANGINA PECTORIS: ICD-10-CM

## 2018-11-02 DIAGNOSIS — Z99.11 ON MECHANICALLY ASSISTED VENTILATION: ICD-10-CM

## 2018-11-02 DIAGNOSIS — Z95.1 S/P CABG X 2: Primary | ICD-10-CM

## 2018-11-02 DIAGNOSIS — I25.10 LEFT MAIN CORONARY ARTERY DISEASE: ICD-10-CM

## 2018-11-02 PROBLEM — I27.20 PULMONARY HYPERTENSION: Status: ACTIVE | Noted: 2018-11-02

## 2018-11-02 LAB
BNP SERPL-MCNC: 88 PG/ML
ESTIMATED AVG GLUCOSE: 131 MG/DL
HBA1C MFR BLD HPLC: 6.2 %
POCT GLUCOSE: 187 MG/DL (ref 70–110)
POCT GLUCOSE: 189 MG/DL (ref 70–110)
POCT GLUCOSE: 208 MG/DL (ref 70–110)
TSH SERPL DL<=0.005 MIU/L-ACNC: 0.74 UIU/ML

## 2018-11-02 PROCEDURE — B2111ZZ FLUOROSCOPY OF MULTIPLE CORONARY ARTERIES USING LOW OSMOLAR CONTRAST: ICD-10-PCS | Performed by: INTERNAL MEDICINE

## 2018-11-02 PROCEDURE — 83036 HEMOGLOBIN GLYCOSYLATED A1C: CPT

## 2018-11-02 PROCEDURE — 25000242 PHARM REV CODE 250 ALT 637 W/ HCPCS: Performed by: INTERNAL MEDICINE

## 2018-11-02 PROCEDURE — 94640 AIRWAY INHALATION TREATMENT: CPT

## 2018-11-02 PROCEDURE — 86920 COMPATIBILITY TEST SPIN: CPT

## 2018-11-02 PROCEDURE — 25000003 PHARM REV CODE 250

## 2018-11-02 PROCEDURE — 99152 MOD SED SAME PHYS/QHP 5/>YRS: CPT

## 2018-11-02 PROCEDURE — G0269 OCCLUSIVE DEVICE IN VEIN ART: HCPCS

## 2018-11-02 PROCEDURE — 4A023N8 MEASUREMENT OF CARDIAC SAMPLING AND PRESSURE, BILATERAL, PERCUTANEOUS APPROACH: ICD-10-PCS | Performed by: INTERNAL MEDICINE

## 2018-11-02 PROCEDURE — 36415 COLL VENOUS BLD VENIPUNCTURE: CPT

## 2018-11-02 PROCEDURE — B2161ZZ FLUOROSCOPY OF RIGHT AND LEFT HEART USING LOW OSMOLAR CONTRAST: ICD-10-PCS | Performed by: INTERNAL MEDICINE

## 2018-11-02 PROCEDURE — 25000003 PHARM REV CODE 250: Performed by: INTERNAL MEDICINE

## 2018-11-02 PROCEDURE — 63600175 PHARM REV CODE 636 W HCPCS: Performed by: INTERNAL MEDICINE

## 2018-11-02 PROCEDURE — 4A133B3 MONITORING OF ARTERIAL PRESSURE, PULMONARY, PERCUTANEOUS APPROACH: ICD-10-PCS | Performed by: INTERNAL MEDICINE

## 2018-11-02 PROCEDURE — C1751 CATH, INF, PER/CENT/MIDLINE: HCPCS

## 2018-11-02 PROCEDURE — 84443 ASSAY THYROID STIM HORMONE: CPT

## 2018-11-02 PROCEDURE — 85025 COMPLETE CBC W/AUTO DIFF WBC: CPT

## 2018-11-02 PROCEDURE — 83880 ASSAY OF NATRIURETIC PEPTIDE: CPT

## 2018-11-02 PROCEDURE — 99152 MOD SED SAME PHYS/QHP 5/>YRS: CPT | Mod: ,,, | Performed by: INTERNAL MEDICINE

## 2018-11-02 PROCEDURE — 93460 R&L HRT ART/VENTRICLE ANGIO: CPT | Mod: 26,,, | Performed by: INTERNAL MEDICINE

## 2018-11-02 PROCEDURE — 02HP32Z INSERTION OF MONITORING DEVICE INTO PULMONARY TRUNK, PERCUTANEOUS APPROACH: ICD-10-PCS | Performed by: INTERNAL MEDICINE

## 2018-11-02 PROCEDURE — 63600175 PHARM REV CODE 636 W HCPCS

## 2018-11-02 PROCEDURE — 4A1239Z MONITORING OF CARDIAC OUTPUT, PERCUTANEOUS APPROACH: ICD-10-PCS | Performed by: INTERNAL MEDICINE

## 2018-11-02 RX ORDER — BUDESONIDE 0.5 MG/2ML
0.5 INHALANT ORAL 2 TIMES DAILY
Status: DISCONTINUED | OUTPATIENT
Start: 2018-11-02 | End: 2018-11-08 | Stop reason: HOSPADM

## 2018-11-02 RX ORDER — GLUCAGON 1 MG
1 KIT INJECTION
Status: DISCONTINUED | OUTPATIENT
Start: 2018-11-02 | End: 2018-11-05

## 2018-11-02 RX ORDER — NITROGLYCERIN 0.4 MG/1
0.4 TABLET SUBLINGUAL EVERY 5 MIN PRN
Status: DISCONTINUED | OUTPATIENT
Start: 2018-11-02 | End: 2018-11-05

## 2018-11-02 RX ORDER — ATROPINE SULFATE 0.1 MG/ML
0.5 INJECTION INTRAVENOUS
Status: DISCONTINUED | OUTPATIENT
Start: 2018-11-02 | End: 2018-11-05 | Stop reason: HOSPADM

## 2018-11-02 RX ORDER — MONTELUKAST SODIUM 10 MG/1
10 TABLET ORAL DAILY
Status: DISCONTINUED | OUTPATIENT
Start: 2018-11-03 | End: 2018-11-05

## 2018-11-02 RX ORDER — HYDROCODONE BITARTRATE AND ACETAMINOPHEN 5; 325 MG/1; MG/1
1 TABLET ORAL EVERY 4 HOURS PRN
Status: DISCONTINUED | OUTPATIENT
Start: 2018-11-02 | End: 2018-11-05

## 2018-11-02 RX ORDER — INSULIN ASPART 100 [IU]/ML
1-10 INJECTION, SOLUTION INTRAVENOUS; SUBCUTANEOUS
Status: DISCONTINUED | OUTPATIENT
Start: 2018-11-02 | End: 2018-11-05

## 2018-11-02 RX ORDER — FINASTERIDE 5 MG/1
5 TABLET, FILM COATED ORAL DAILY
Status: DISCONTINUED | OUTPATIENT
Start: 2018-11-03 | End: 2018-11-05

## 2018-11-02 RX ORDER — AMLODIPINE BESYLATE 10 MG/1
10 TABLET ORAL DAILY
Status: DISCONTINUED | OUTPATIENT
Start: 2018-11-03 | End: 2018-11-05

## 2018-11-02 RX ORDER — LANOLIN ALCOHOL/MO/W.PET/CERES
500 CREAM (GRAM) TOPICAL DAILY
Status: DISCONTINUED | OUTPATIENT
Start: 2018-11-03 | End: 2018-11-05

## 2018-11-02 RX ORDER — HYDROCHLOROTHIAZIDE 12.5 MG/1
12.5 TABLET ORAL DAILY
Status: DISCONTINUED | OUTPATIENT
Start: 2018-11-03 | End: 2018-11-05

## 2018-11-02 RX ORDER — FLUTICASONE FUROATE AND VILANTEROL 100; 25 UG/1; UG/1
1 POWDER RESPIRATORY (INHALATION) DAILY
Status: DISCONTINUED | OUTPATIENT
Start: 2018-11-03 | End: 2018-11-02 | Stop reason: CLARIF

## 2018-11-02 RX ORDER — IBUPROFEN 200 MG
16 TABLET ORAL
Status: DISCONTINUED | OUTPATIENT
Start: 2018-11-02 | End: 2018-11-05

## 2018-11-02 RX ORDER — DIPHENHYDRAMINE HCL 50 MG
50 CAPSULE ORAL ONCE
Status: DISCONTINUED | OUTPATIENT
Start: 2018-11-02 | End: 2018-11-02 | Stop reason: HOSPADM

## 2018-11-02 RX ORDER — ACETAMINOPHEN 325 MG/1
650 TABLET ORAL EVERY 4 HOURS PRN
Status: DISCONTINUED | OUTPATIENT
Start: 2018-11-02 | End: 2018-11-07

## 2018-11-02 RX ORDER — IBUPROFEN 200 MG
24 TABLET ORAL
Status: DISCONTINUED | OUTPATIENT
Start: 2018-11-02 | End: 2018-11-05

## 2018-11-02 RX ORDER — INSULIN ASPART 100 [IU]/ML
3-4 INJECTION, SOLUTION INTRAVENOUS; SUBCUTANEOUS
Status: DISCONTINUED | OUTPATIENT
Start: 2018-11-02 | End: 2018-11-03

## 2018-11-02 RX ORDER — ALBUTEROL SULFATE 90 UG/1
2 AEROSOL, METERED RESPIRATORY (INHALATION) EVERY 6 HOURS PRN
Status: DISCONTINUED | OUTPATIENT
Start: 2018-11-02 | End: 2018-11-02 | Stop reason: CLARIF

## 2018-11-02 RX ORDER — OXYCODONE AND ACETAMINOPHEN 5; 325 MG/1; MG/1
1 TABLET ORAL EVERY 4 HOURS PRN
Status: DISCONTINUED | OUTPATIENT
Start: 2018-11-02 | End: 2018-11-05

## 2018-11-02 RX ORDER — METOPROLOL SUCCINATE 25 MG/1
25 TABLET, EXTENDED RELEASE ORAL DAILY
Status: DISCONTINUED | OUTPATIENT
Start: 2018-11-03 | End: 2018-11-05

## 2018-11-02 RX ORDER — EZETIMIBE 10 MG/1
10 TABLET ORAL DAILY
Status: DISCONTINUED | OUTPATIENT
Start: 2018-11-03 | End: 2018-11-05

## 2018-11-02 RX ORDER — VALSARTAN 160 MG/1
160 TABLET ORAL 2 TIMES DAILY
Status: DISCONTINUED | OUTPATIENT
Start: 2018-11-02 | End: 2018-11-05

## 2018-11-02 RX ORDER — ALBUTEROL SULFATE 0.83 MG/ML
2.5 SOLUTION RESPIRATORY (INHALATION) EVERY 6 HOURS PRN
Status: DISCONTINUED | OUTPATIENT
Start: 2018-11-02 | End: 2018-11-08 | Stop reason: HOSPADM

## 2018-11-02 RX ORDER — ARFORMOTEROL TARTRATE 15 UG/2ML
15 SOLUTION RESPIRATORY (INHALATION) 2 TIMES DAILY
Status: DISCONTINUED | OUTPATIENT
Start: 2018-11-02 | End: 2018-11-08 | Stop reason: HOSPADM

## 2018-11-02 RX ORDER — SODIUM CHLORIDE 9 MG/ML
INJECTION, SOLUTION INTRAVENOUS CONTINUOUS
Status: DISCONTINUED | OUTPATIENT
Start: 2018-11-02 | End: 2018-11-03

## 2018-11-02 RX ORDER — OXYCODONE HYDROCHLORIDE 5 MG/1
10 TABLET ORAL EVERY 4 HOURS PRN
Status: DISCONTINUED | OUTPATIENT
Start: 2018-11-02 | End: 2018-11-05

## 2018-11-02 RX ORDER — SODIUM CHLORIDE 9 MG/ML
INJECTION, SOLUTION INTRAVENOUS CONTINUOUS
Status: ACTIVE | OUTPATIENT
Start: 2018-11-02 | End: 2018-11-02

## 2018-11-02 RX ORDER — DOCUSATE SODIUM 100 MG/1
100 CAPSULE, LIQUID FILLED ORAL 2 TIMES DAILY
Status: DISCONTINUED | OUTPATIENT
Start: 2018-11-02 | End: 2018-11-05

## 2018-11-02 RX ADMIN — BUDESONIDE 0.5 MG: 0.5 SUSPENSION RESPIRATORY (INHALATION) at 07:11

## 2018-11-02 RX ADMIN — VALSARTAN 160 MG: 80 TABLET, FILM COATED ORAL at 08:11

## 2018-11-02 RX ADMIN — SODIUM CHLORIDE: 0.9 INJECTION, SOLUTION INTRAVENOUS at 10:11

## 2018-11-02 RX ADMIN — SODIUM CHLORIDE: 0.9 INJECTION, SOLUTION INTRAVENOUS at 09:11

## 2018-11-02 RX ADMIN — DOCUSATE SODIUM 100 MG: 100 CAPSULE, LIQUID FILLED ORAL at 08:11

## 2018-11-02 RX ADMIN — INSULIN ASPART 4 UNITS: 100 INJECTION, SOLUTION INTRAVENOUS; SUBCUTANEOUS at 05:11

## 2018-11-02 RX ADMIN — SODIUM CHLORIDE: 9 INJECTION, SOLUTION INTRAVENOUS at 02:11

## 2018-11-02 RX ADMIN — ARFORMOTEROL TARTRATE 15 MCG: 15 SOLUTION RESPIRATORY (INHALATION) at 07:11

## 2018-11-02 RX ADMIN — INSULIN ASPART 1 UNITS: 100 INJECTION, SOLUTION INTRAVENOUS; SUBCUTANEOUS at 09:11

## 2018-11-02 NOTE — ASSESSMENT & PLAN NOTE
The patient is a 73-year-old male who was worked up for complaints of shortness of breath.  Cardiac catheterization today shows significant left main coronary artery disease.  The patient is a candidate for coronary artery bypass grafting which will be scheduled for 11/05/2018.  The risks and benefits of surgery were explained to the patient. The patient understands and has agreed to proceed with surgery..

## 2018-11-02 NOTE — SUBJECTIVE & OBJECTIVE
No current facility-administered medications on file prior to encounter.      Current Outpatient Medications on File Prior to Encounter   Medication Sig    amlodipine-valsartan (EXFORGE)  mg per tablet Take 1 tablet by mouth once daily.    budesonide-formoterol 160-4.5 mcg (SYMBICORT) 160-4.5 mcg/actuation HFAA INHALE 2 PUFFS INTO THE LUNGS TWO TIMES A DAY (Patient taking differently: Inhale 2 puffs into the lungs every 12 (twelve) hours. INHALE 2 PUFFS INTO THE LUNGS TWO TIMES A DAY)    ezetimibe (ZETIA) 10 mg tablet TAKE ONE BY MOUTH EVERY DAY.    finasteride (PROSCAR) 5 mg tablet Take 1 tablet (5 mg total) by mouth once daily.    GLUCOSAMINE HCL/CHONDR ROSARIO A NA (OSTEO BI-FLEX ORAL) Take 1 tablet by mouth once daily.    HUMALOG KWIKPEN 100 unit/mL InPn pen INJECT 3-4 UNITS INTO THE SKIN THREE TIMES DAILY BEFORE MEALS. (Patient taking differently: Currently on 12 units hs)    hydroCHLOROthiazide (HYDRODIURIL) 12.5 MG Tab Take 1 tablet (12.5 mg total) by mouth once daily.    insulin glargine (LANTUS SOLOSTAR) 100 unit/mL (3 mL) InPn pen Inject 40 Units into the skin once daily.    metoprolol succinate (TOPROL-XL) 50 MG 24 hr tablet TAKE ONE BY MOUTH TWO TIMES A DAY. (Patient taking differently: 1/2 a tablet qday)    mometasone (NASONEX) 50 mcg/actuation nasal spray 2 sprays by Nasal route once daily. (Patient taking differently: 2 sprays by Nasal route daily as needed. )    montelukast (SINGULAIR) 10 mg tablet TAKE ONE TABLET BY MOUTH EVERY DAY.    niacin, inositol niacinate, (NIACIN FLUSH FREE ORAL) Take 500 mg by mouth once daily.    OMEGA-3 FATTY ACIDS/DHA/EPA (MEGARED PLANT-OMEGA-3 ORAL) Take 1 capsule by mouth once daily.    saw palmetto 80 MG capsule Take 450 mg by mouth 2 (two) times daily.     SYNTHROID 137 mcg Tab tablet TAKE 2 TABLETS BY MOUTH BEFORE BREAKFAST.    triamcinolone acetonide 0.025% (KENALOG) 0.025 % cream APPLY TO RASH TWO TIMES A DAY    UNABLE TO FIND medication name:  "Milk thislte    albuterol-ipratropium (DUO-NEB) 2.5 mg-0.5 mg/3 mL nebulizer solution Take 3 mLs by nebulization 2 (two) times daily. Rescue    blood sugar diagnostic (BLOOD GLUCOSE TEST) Strp 1 strip by Misc.(Non-Drug; Combo Route) route 3 (three) times daily. accu-chek lux plus    docusate sodium (COLACE) 100 MG capsule Take 1 capsule (100 mg total) by mouth 2 (two) times daily.    lancets (ACCU-CHEK FASTCLIX) Misc Test 2-3 times daily    MV,CA,IRON,MIN/FA/PHYTOSTEROL (CENTRUM SPECIALIST HEART ORAL) Take 1 tablet by mouth once daily.     oxyCODONE-acetaminophen (PERCOCET) 5-325 mg per tablet Take 1-2 tablets by mouth every 4 (four) hours as needed for Pain.    pen needle, diabetic (BD INSULIN PEN NEEDLE UF MINI) 31 gauge x 3/16" Ndle USE AS DIRECTED    PROAIR HFA 90 mcg/actuation inhaler Inhale 2 puffs into the lungs every 6 (six) hours.    sildenafil (REVATIO) 20 mg Tab Take 2 tablets by mouth one hour prior to intercourse. Max two per 24 hours    [DISCONTINUED] RESTASIS 0.05 % ophthalmic emulsion PLACE 1 DROP IN BOTH EYES TWO TIMES A DAY    [DISCONTINUED] tobramycin sulfate 0.3% (TOBREX) 0.3 % ophthalmic ointment Apply to right lower eyelid 4 times daily for 5 days.       Review of patient's allergies indicates:   Allergen Reactions    Iodinated contrast- oral and iv dye Hives     Tachycardia    Omeprazole Hives and Itching    Prilosec [omeprazole magnesium] Hives and Itching       Past Medical History:   Diagnosis Date    Aortic stenosis     Asthma     BPH (benign prostatic hyperplasia)     CAD (coronary artery disease)     40% lesion 2002;[facility]azo    Cirrhosis     Claudication 4/9/2014    Colon polyp     ED (erectile dysfunction)     Encounter for blood transfusion     Ex-smoker     Hearing loss NEC     Hepatitis C     Cured;reji brown 2015    Hyperlipidemia     Hypertension     Hypothyroid     s/p tx graves    DELONTE on CPAP     Osteoarthritis     PVD (peripheral vascular " disease)     Type 2 diabetes mellitus      Past Surgical History:   Procedure Laterality Date    CARDIAC CATHETERIZATION      CARPAL TUNNEL RELEASE Left     CATARACT EXTRACTION W/ INTRAOCULAR LENS  IMPLANT, BILATERAL      COLONOSCOPY  2013    COLONOSCOPY N/A 2016    Procedure: COLONOSCOPY;  Surgeon: Anna Tomas MD;  Location: [facility];  Service: Endoscopy;  Laterality: N/A;    COLONOSCOPY N/A 2016    [facility]    COLONOSCOPY N/A 2013    Per[facility] by Nikhil Watson [facility] at [facility]    ELBOW BURSA SURGERY Right 2010    ESOPHAGOGASTRODUODENOSCOPY (EGD) N/A 2017    [facility] at [facility]    ESOPHAGOGASTRODUODENOSCOPY (EGD) N/A 2016    [facility] at [facility]    EYE SURGERY      KNEE SURGERY Right     RECTAL SURGERY  2012    TRANSURETHRAL RESECTION OF PROSTATE (TURP) WITHOUT USE OF LASER N/A 2018    Procedure: TURP, WITHOUT USING LASER;  Surgeon: Cooper Cordova IV, MD;  Location: [facility];  Service: Urology;  Laterality: N/A;    TRIGGER FINGER RELEASE Left     TURP, WITHOUT USING LASER N/A 2018    Performed by Cooper Cordova IV, MD at [facility] OR     Family History     Problem Relation (Age of Onset)    Heart disease Mother, Father, Brother        Tobacco Use    Smoking status: Former Smoker     Packs/day: 0.50     Years: 4.00     Pack years: 2.00     Last attempt to quit: 1972     Years since quittin.4    Smokeless tobacco: Former User     Types: Chew     Quit date: 1976   Substance and Sexual Activity    Alcohol use: Yes     Alcohol/week: 1.2 oz     Types: 2 Cans of beer per week     Comment: daily  No alcohol prior to surgery    Drug use: No    Sexual activity: Yes     Partners: Female     Review of Systems   Constitutional: Negative for activity change, appetite change, chills, diaphoresis and fatigue.   HENT: Positive for hearing loss. Negative for ear pain.    Eyes:  Negative for pain, discharge and visual disturbance.   Respiratory: Positive for cough and shortness of breath. Negative for choking and chest tightness.    Cardiovascular: Positive for leg swelling. Negative for chest pain and palpitations.   Gastrointestinal: Negative for abdominal distention, abdominal pain, blood in stool, constipation and diarrhea.   Endocrine: Negative for cold intolerance, heat intolerance and polyuria.   Genitourinary: Negative for difficulty urinating and enuresis.   Musculoskeletal: Negative for arthralgias and gait problem.   Allergic/Immunologic: Positive for environmental allergies. Negative for food allergies.   Neurological: Negative for dizziness and facial asymmetry.   Hematological: Bruises/bleeds easily.   Psychiatric/Behavioral: Negative for agitation and behavioral problems.     Objective:     Vital Signs (Most Recent):  Temp: 97.8 °F (36.6 °C) (11/02/18 1608)  Pulse: 69 (11/02/18 1608)  Resp: 18 (11/02/18 1608)  BP: (!) 152/71 (11/02/18 1608)  SpO2: 96 % (11/02/18 1608) Vital Signs (24h Range):  Temp:  [97.8 °F (36.6 °C)-98.3 °F (36.8 °C)] 97.8 °F (36.6 °C)  Pulse:  [69-83] 69  Resp:  [13-24] 18  SpO2:  [96 %-98 %] 96 %  BP: (119-169)/(64-98) 152/71     Weight: 114.3 kg (252 lb)  Body mass index is 38.32 kg/m².    SpO2: 96 %  O2 Device (Oxygen Therapy): room air     Intake/Output - Last 3 Shifts       10/31 0700 - 11/01 0659 11/01 0700 - 11/02 0659 11/02 0700 - 11/03 0659    Urine (mL/kg/hr)   600    Total Output   600    Net   -600                  Lines/Drains/Airways          None           STS Risk Score:  Mortality rate 1.75% , mortality and or morbidity of 19%  Physical Exam   Constitutional: He is oriented to person, place, and time. He appears well-developed and well-nourished. No distress.   HENT:   Head: Normocephalic and atraumatic.   Mouth/Throat: Oropharynx is clear and moist.   Eyes: EOM are normal. Pupils are equal, round, and reactive to light.   Neck: Normal  range of motion. Neck supple. No JVD present.   Cardiovascular: Normal rate and regular rhythm.   Murmur heard.  Pulmonary/Chest: Effort normal and breath sounds normal.   Abdominal: Soft. Bowel sounds are normal.   Musculoskeletal: He exhibits edema. He exhibits no deformity.   Lymphadenopathy:     He has no cervical adenopathy.   Neurological: He is alert and oriented to person, place, and time. No cranial nerve deficit.   Skin: Skin is warm and dry. He is not diaphoretic.       Significant Labs:  All pertinent labs from the last 24 hours have been reviewed.    Significant Diagnostics:  I have reviewed all pertinent imaging results/findings within the past 24 hours.

## 2018-11-02 NOTE — BRIEF OP NOTE
<[facility]  Surgery Department  Operative Note    SUMMARY     Date of Procedure: 11/2/2018     Procedure: Procedure(s) (LRB):  CATHETERIZATION, HEART, BOTH LEFT AND RIGHT (N/A)     Surgeon(s) and Role:     * Frank Gallardo MD - Primary    Assisting Surgeon: None    Pre-Operative Diagnosis: SOB (shortness of breath) [R06.02]  Coronary artery disease involving native coronary artery of native heart, angina presence unspecified [I25.10]  Aortic valve stenosis, etiology of cardiac valve disease unspecified [I35.0]    Post-Operative Diagnosis: Post-Op Diagnosis Codes:     * SOB (shortness of breath) [R06.02]     * Coronary artery disease involving native coronary artery of native heart, angina presence unspecified [I25.10]     * Aortic valve stenosis, etiology of cardiac valve disease unspecified [I35.0]    Anesthesia: RN IV Sedation    Technical Procedures Used: LHC/RHC    Description of the Findings of the Procedure: LHC/RHC- Significant LM involving LCx ostial and LAD-ostial,NML LV function, MILD AS  RECOMMEND CABG  Significant Surgical Tasks Conducted by the Assistant(s), if Applicable: none    Complications: No    Estimated Blood Loss (EBL): < 50 cc           Implants: * No implants in log *    Specimens:   Specimen (12h ago, onward)    None                  Condition: Good    Disposition: PACU - hemodynamically stable.    Attestation: I was present and scrubbed for the entire procedure.

## 2018-11-02 NOTE — NURSING TRANSFER
Nursing Transfer Note      11/2/2018     Transfer To: 247    Transfer via wheelchair    Transfer with cardiac monitoring    Transported by [employer]    Medicines sent: none    Chart send with patient: Yes    Notified: spouse      TRANSFERRED TO ROOM. TRANSFERRED TO BED.  TELEMETRY MONITER ON.  IV FLUIDS ON PUMP AT PRESCRIBED RATE.  PROCEDURAL ACCESS SITE WITHOUT BLEEDING OR HEMATOMA.  DRESSING DRY AND INTACT.  CARE HANDED OFF TO kortney 11/2/18 1830..........

## 2018-11-02 NOTE — CONSULTS
[facility] -   Cardiothoracic Surgery  Consult Note    Patient Name: Erendira Pretty  MRN: [id number]  Admission Date: 11/2/2018  Attending Physician: Frank Gallardo MD  Referring Provider: Cortes Roblero MD    Patient information was obtained from patient and past medical records.     Consults  Subjective:     Principal Problem: <principal problem not specified>    History of Present Illness: The patient is a 73-year-old male who was worked up for complaints of dyspnea on exertion.  The patient was in his usual state of health until about a year ago when he noticed dyspnea on exertion on moderate to severe exertion.  The patient's symptoms have been progressively getting worse.  The patient denies orthopnea or PND.  He denies palpitations or dizziness.  The patient has mild ankle swelling right greater than left. He denies chest pain. He denies fever or chills.  But the patient has a chronic cough.  Cardiac catheterization done today shows critical left main stenosis.    No current facility-administered medications on file prior to encounter.      Current Outpatient Medications on File Prior to Encounter   Medication Sig    amlodipine-valsartan (EXFORGE)  mg per tablet Take 1 tablet by mouth once daily.    budesonide-formoterol 160-4.5 mcg (SYMBICORT) 160-4.5 mcg/actuation HFAA INHALE 2 PUFFS INTO THE LUNGS TWO TIMES A DAY (Patient taking differently: Inhale 2 puffs into the lungs every 12 (twelve) hours. INHALE 2 PUFFS INTO THE LUNGS TWO TIMES A DAY)    ezetimibe (ZETIA) 10 mg tablet TAKE ONE BY MOUTH EVERY DAY.    finasteride (PROSCAR) 5 mg tablet Take 1 tablet (5 mg total) by mouth once daily.    GLUCOSAMINE HCL/CHONDR ROSARIO A NA (OSTEO BI-FLEX ORAL) Take 1 tablet by mouth once daily.    HUMALOG KWIKPEN 100 unit/mL InPn pen INJECT 3-4 UNITS INTO THE SKIN THREE TIMES DAILY BEFORE MEALS. (Patient taking differently: Currently on 12 units hs)    hydroCHLOROthiazide (HYDRODIURIL) 12.5 MG  "Tab Take 1 tablet (12.5 mg total) by mouth once daily.    insulin glargine (LANTUS SOLOSTAR) 100 unit/mL (3 mL) InPn pen Inject 40 Units into the skin once daily.    metoprolol succinate (TOPROL-XL) 50 MG 24 hr tablet TAKE ONE BY MOUTH TWO TIMES A DAY. (Patient taking differently: 1/2 a tablet qday)    mometasone (NASONEX) 50 mcg/actuation nasal spray 2 sprays by Nasal route once daily. (Patient taking differently: 2 sprays by Nasal route daily as needed. )    montelukast (SINGULAIR) 10 mg tablet TAKE ONE TABLET BY MOUTH EVERY DAY.    niacin, inositol niacinate, (NIACIN FLUSH FREE ORAL) Take 500 mg by mouth once daily.    OMEGA-3 FATTY ACIDS/DHA/EPA (MEGARED PLANT-OMEGA-3 ORAL) Take 1 capsule by mouth once daily.    saw palmetto 80 MG capsule Take 450 mg by mouth 2 (two) times daily.     SYNTHROID 137 mcg Tab tablet TAKE 2 TABLETS BY MOUTH BEFORE BREAKFAST.    triamcinolone acetonide 0.025% (KENALOG) 0.025 % cream APPLY TO RASH TWO TIMES A DAY    UNABLE TO FIND medication name: Milk thislte    albuterol-ipratropium (DUO-NEB) 2.5 mg-0.5 mg/3 mL nebulizer solution Take 3 mLs by nebulization 2 (two) times daily. Rescue    blood sugar diagnostic (BLOOD GLUCOSE TEST) Strp 1 strip by Misc.(Non-Drug; Combo Route) route 3 (three) times daily. accu-chek lux plus    docusate sodium (COLACE) 100 MG capsule Take 1 capsule (100 mg total) by mouth 2 (two) times daily.    lancets (ACCU-CHEK FASTCLIX) Misc Test 2-3 times daily    MV,CA,IRON,MIN/FA/PHYTOSTEROL (CENTRUM SPECIALIST HEART ORAL) Take 1 tablet by mouth once daily.     oxyCODONE-acetaminophen (PERCOCET) 5-325 mg per tablet Take 1-2 tablets by mouth every 4 (four) hours as needed for Pain.    pen needle, diabetic (BD INSULIN PEN NEEDLE UF MINI) 31 gauge x 3/16" Ndle USE AS DIRECTED    PROAIR HFA 90 mcg/actuation inhaler Inhale 2 puffs into the lungs every 6 (six) hours.    sildenafil (REVATIO) 20 mg Tab Take 2 tablets by mouth one hour prior to " intercourse. Max two per 24 hours    [DISCONTINUED] RESTASIS 0.05 % ophthalmic emulsion PLACE 1 DROP IN BOTH EYES TWO TIMES A DAY    [DISCONTINUED] tobramycin sulfate 0.3% (TOBREX) 0.3 % ophthalmic ointment Apply to right lower eyelid 4 times daily for 5 days.       Review of patient's allergies indicates:   Allergen Reactions    Iodinated contrast- oral and iv dye Hives     Tachycardia    Omeprazole Hives and Itching    Prilosec [omeprazole magnesium] Hives and Itching       Past Medical History:   Diagnosis Date    Aortic stenosis     Asthma     BPH (benign prostatic hyperplasia)     CAD (coronary artery disease)     40% lesion 2002;[facility]    Cirrhosis     Claudication 4/9/2014    Colon polyp     ED (erectile dysfunction)     Encounter for blood transfusion     Ex-smoker     Hearing loss NEC     Hepatitis C     Cured;yuly brownoni 2015    Hyperlipidemia     Hypertension     Hypothyroid     s/p tx graves    DELONTE on CPAP     Osteoarthritis     PVD (peripheral vascular disease)     Type 2 diabetes mellitus      Past Surgical History:   Procedure Laterality Date    CARDIAC CATHETERIZATION      CARPAL TUNNEL RELEASE Left     CATARACT EXTRACTION W/ INTRAOCULAR LENS  IMPLANT, BILATERAL  2008    COLONOSCOPY  8/2013    COLONOSCOPY N/A 5/30/2016    Procedure: COLONOSCOPY;  Surgeon: Anna Tomas MD;  Location: [facility];  Service: Endoscopy;  Laterality: N/A;    COLONOSCOPY N/A 5/30/2016    [facility]formed by [facility] at [facility]    COLONOSCOPY N/A 8/23/2013    [facility] by [facility]il [facility] at [facility]    ELBOW BURSA SURGERY Right 2010    ESOPHAGOGASTRODUODENOSCOPY (EGD) N/A 6/26/2017    [facility] by [facility] at [facility]    ESOPHAGOGASTRODUODENOSCOPY (EGD) N/A 5/30/2016    Performed by [facility]omas MD at [facility]    EYE SURGERY      KNEE SURGERY Right     RECTAL SURGERY  2012    TRANSURETHRAL RESECTION OF PROSTATE (TURP) WITHOUT USE OF LASER N/A 6/19/2018    Procedure:  TURP, WITHOUT USING LASER;  Surgeon: Cooper Cordova IV, MD;  Location: [facility] OR;  Service: Urology;  Laterality: N/A;    TRIGGER FINGER RELEASE Left     TURP, WITHOUT USING LASER N/A 2018    Performed by Cooper Cordova IV, MD at [facility] OR     Family History     Problem Relation (Age of Onset)    Heart disease Mother, Father, Brother        Tobacco Use    Smoking status: Former Smoker     Packs/day: 0.50     Years: 4.00     Pack years: 2.00     Last attempt to quit: 1972     Years since quittin.4    Smokeless tobacco: Former User     Types: Chew     Quit date: 1976   Substance and Sexual Activity    Alcohol use: Yes     Alcohol/week: 1.2 oz     Types: 2 Cans of beer per week     Comment: daily  No alcohol prior to surgery    Drug use: No    Sexual activity: Yes     Partners: Female     Review of Systems   Constitutional: Negative for activity change, appetite change, chills, diaphoresis and fatigue.   HENT: Positive for hearing loss. Negative for ear pain.    Eyes: Negative for pain, discharge and visual disturbance.   Respiratory: Positive for cough and shortness of breath. Negative for choking and chest tightness.    Cardiovascular: Positive for leg swelling. Negative for chest pain and palpitations.   Gastrointestinal: Negative for abdominal distention, abdominal pain, blood in stool, constipation and diarrhea.   Endocrine: Negative for cold intolerance, heat intolerance and polyuria.   Genitourinary: Negative for difficulty urinating and enuresis.   Musculoskeletal: Negative for arthralgias and gait problem.   Allergic/Immunologic: Positive for environmental allergies. Negative for food allergies.   Neurological: Negative for dizziness and facial asymmetry.   Hematological: Bruises/bleeds easily.   Psychiatric/Behavioral: Negative for agitation and behavioral problems.     Objective:     Vital Signs (Most Recent):  Temp: 97.8 °F (36.6 °C) (18 1608)  Pulse: 69 (18  1608)  Resp: 18 (11/02/18 1608)  BP: (!) 152/71 (11/02/18 1608)  SpO2: 96 % (11/02/18 1608) Vital Signs (24h Range):  Temp:  [97.8 °F (36.6 °C)-98.3 °F (36.8 °C)] 97.8 °F (36.6 °C)  Pulse:  [69-83] 69  Resp:  [13-24] 18  SpO2:  [96 %-98 %] 96 %  BP: (119-169)/(64-98) 152/71     Weight: 114.3 kg (252 lb)  Body mass index is 38.32 kg/m².    SpO2: 96 %  O2 Device (Oxygen Therapy): room air     Intake/Output - Last 3 Shifts       10/31 0700 - 11/01 0659 11/01 0700 - 11/02 0659 11/02 0700 - 11/03 0659    Urine (mL/kg/hr)   600    Total Output   600    Net   -600                  Lines/Drains/Airways          None           STS Risk Score:  Mortality rate 1.75% , mortality and or morbidity of 19%  Physical Exam   Constitutional: He is oriented to person, place, and time. He appears well-developed and well-nourished. No distress.   HENT:   Head: Normocephalic and atraumatic.   Mouth/Throat: Oropharynx is clear and moist.   Eyes: EOM are normal. Pupils are equal, round, and reactive to light.   Neck: Normal range of motion. Neck supple. No JVD present.   Cardiovascular: Normal rate and regular rhythm.   Murmur heard.  Pulmonary/Chest: Effort normal and breath sounds normal.   Abdominal: Soft. Bowel sounds are normal.   Musculoskeletal: He exhibits edema. He exhibits no deformity.   Lymphadenopathy:     He has no cervical adenopathy.   Neurological: He is alert and oriented to person, place, and time. No cranial nerve deficit.   Skin: Skin is warm and dry. He is not diaphoretic.       Significant Labs:  All pertinent labs from the last 24 hours have been reviewed.    Significant Diagnostics:  I have reviewed all pertinent imaging results/findings within the past 24 hours.    Assessment/Plan:     NYHA Score: NYHA II: slight limitation of physical activity, comfortable at rest    Left main coronary artery disease    The patient is a 73-year-old male who was worked up for complaints of shortness of breath.  Cardiac  catheterization today shows significant left main coronary artery disease.  The patient is a candidate for coronary artery bypass grafting which will be scheduled for 11/05/2018.  The risks and benefits of surgery were explained to the patient. The patient understands and has agreed to proceed with surgery..         Thank you for your consult. I will follow-up with patient. Please contact us if you have any additional questions.    Bear De Luna MD  Cardiothoracic Surgery  [facility]

## 2018-11-02 NOTE — H&P
Subjective:   Patient ID:  Erendira Pretty is a 73 y.o. male who presents for follow-up of Coronary Artery Disease; Hypertension; Chest Pain; and Shortness of Breath  Pt worsening SOB.Pt denies CP.NMT (-) 9-17.     Hypertension   This is a chronic problem. The current episode started more than 1 year ago. The problem has been gradually improving since onset. The problem is controlled. Pertinent negatives include no chest pain, palpitations or shortness of breath. Past treatments include angiotensin blockers, calcium channel blockers, beta blockers and diuretics. The current treatment provides moderate improvement. There are no compliance problems.    Hyperlipidemia   This is a chronic problem. The current episode started more than 1 year ago. The problem is controlled. Recent lipid tests were reviewed and are variable. Factors aggravating his hyperlipidemia include beta blockers. Pertinent negatives include no chest pain or shortness of breath. Current antihyperlipidemic treatment includes statins and ezetimibe. The current treatment provides moderate improvement of lipids. Compliance problems include medication side effects.    Coronary Artery Disease   Presents for follow-up visit. Pertinent negatives include no chest pain, chest pressure, chest tightness, dizziness, leg swelling, muscle weakness, palpitations, shortness of breath or weight gain. Risk factors include hyperlipidemia. The symptoms have been stable. Compliance with diet is variable. Compliance with exercise is variable. Compliance with medications is good.         Review of Systems   Constitution: Negative. Negative for weight gain.   HENT: Negative.    Eyes: Negative.    Cardiovascular: Negative.  Negative for chest pain, leg swelling and palpitations.   Respiratory: Negative.  Negative for chest tightness and shortness of breath.    Endocrine: Negative.    Hematologic/Lymphatic: Negative.    Skin: Negative.    Musculoskeletal: Negative for muscle  weakness.   Gastrointestinal: Negative.    Genitourinary: Negative.    Neurological: Negative.  Negative for dizziness.   Psychiatric/Behavioral: Negative.    Allergic/Immunologic: Negative.             Family History   Problem Relation Age of Onset    Heart disease Mother      Heart disease Father      Heart disease Brother      Colon cancer Neg Hx             Past Medical and Surgical History:   Diagnosis Date    Aortic stenosis      Asthma      BPH (benign prostatic hyperplasia)      CAD (coronary artery disease)       40% lesion 2002;[facility]    Cirrhosis      Claudication 4/9/2014    Colon polyp      ED (erectile dysfunction)      Encounter for blood transfusion      Ex-smoker      Hearing loss NEC      Hepatitis C       Cured;reji brown 2015    Hyperlipidemia      Hypertension      Hypothyroid       s/p tx [facility]    DELONTE on CPAP      Osteoarthritis      PVD (peripheral vascular disease)      Type 2 diabetes mellitus               Current Outpatient Medications on File Prior to Visit   Medication Sig Dispense Refill    albuterol-ipratropium (DUO-NEB) 2.5 mg-0.5 mg/3 mL nebulizer solution Take 3 mLs by nebulization 2 (two) times daily. Rescue 120 vial 5    amlodipine-valsartan (EXFORGE)  mg per tablet Take 1 tablet by mouth once daily. 90 tablet 3    budesonide-formoterol 160-4.5 mcg (SYMBICORT) 160-4.5 mcg/actuation HFAA INHALE 2 PUFFS INTO THE LUNGS TWO TIMES A DAY (Patient taking differently: Inhale 2 puffs into the lungs every 12 (twelve) hours. INHALE 2 PUFFS INTO THE LUNGS TWO TIMES A DAY) 10.2 g 11    ezetimibe (ZETIA) 10 mg tablet TAKE ONE BY MOUTH EVERY DAY. 90 tablet 3    finasteride (PROSCAR) 5 mg tablet Take 1 tablet (5 mg total) by mouth once daily. 30 tablet 11    GLUCOSAMINE HCL/CHONDR ROSARIO A NA (OSTEO BI-FLEX ORAL) Take 1 tablet by mouth once daily.        HUMALOG KWIKPEN 100 unit/mL InPn pen INJECT 3-4 UNITS INTO THE SKIN THREE TIMES DAILY BEFORE MEALS.  (Patient taking differently: Currently on 12 units hs) 3 mL 11    hydroCHLOROthiazide (HYDRODIURIL) 12.5 MG Tab TAKE 1 TABLET BY MOUTH EVERY DAY. 30 tablet 0    insulin glargine (LANTUS SOLOSTAR) 100 unit/mL (3 mL) InPn pen Inject 40 Units into the skin once daily. 5 Syringe 6    levocetirizine (XYZAL) 5 MG tablet TAKE ONE BY MOUTH EVERY EVENING. 30 tablet 11    metoprolol succinate (TOPROL-XL) 50 MG 24 hr tablet TAKE ONE BY MOUTH TWO TIMES A DAY. (Patient taking differently: 1/2 a tablet qday) 60 tablet 11    mometasone (NASONEX) 50 mcg/actuation nasal spray 2 sprays by Nasal route once daily. (Patient taking differently: 2 sprays by Nasal route daily as needed. ) 17 g 12    montelukast (SINGULAIR) 10 mg tablet TAKE ONE TABLET BY MOUTH EVERY DAY. 30 tablet 11    MV,CA,IRON,MIN/FA/PHYTOSTEROL (CENTRUM SPECIALIST HEART ORAL) Take 1 tablet by mouth once daily.         niacin, inositol niacinate, (NIACIN FLUSH FREE ORAL) Take 500 mg by mouth once daily.        OMEGA-3 FATTY ACIDS/DHA/EPA (MEGARED PLANT-OMEGA-3 ORAL) Take 1 capsule by mouth once daily.        PROAIR HFA 90 mcg/actuation inhaler Inhale 2 puffs into the lungs every 6 (six) hours. 8.5 g 12    RABEprazole (ACIPHEX) 20 mg tablet TAKE 1 TABLET BY MOUTH TWO TIMES A DAY 60 tablet 11    RESTASIS 0.05 % ophthalmic emulsion PLACE 1 DROP IN BOTH EYES TWO TIMES A DAY 60 each 0    saw palmetto 80 MG capsule Take 450 mg by mouth 2 (two) times daily.         sildenafil (REVATIO) 20 mg Tab Take 2 tablets by mouth one hour prior to intercourse. Max two per 24 hours 90 tablet 5    SYNTHROID 137 mcg Tab tablet TAKE 2 TABLETS BY MOUTH BEFORE BREAKFAST. 60 tablet 5    tobramycin sulfate 0.3% (TOBREX) 0.3 % ophthalmic ointment Apply to right lower eyelid 4 times daily for 5 days. 1 Tube 0    triamcinolone acetonide 0.025% (KENALOG) 0.025 % cream APPLY TO RASH TWO TIMES A DAY   6    UNABLE TO FIND medication name: Milk thislte        blood sugar diagnostic  "(BLOOD GLUCOSE TEST) Strp 1 strip by Misc.(Non-Drug; Combo Route) route 3 (three) times daily. accu-chek lux plus 100 strip 11    docusate sodium (COLACE) 100 MG capsule Take 1 capsule (100 mg total) by mouth 2 (two) times daily. 60 capsule 0    lancets (ACCU-CHEK FASTCLIX) Misc Test 2-3 times daily 200 each 3    oxyCODONE-acetaminophen (PERCOCET) 5-325 mg per tablet Take 1-2 tablets by mouth every 4 (four) hours as needed for Pain. 30 tablet 0    pen needle, diabetic (BD INSULIN PEN NEEDLE UF MINI) 31 gauge x 3/16" Ndle USE AS DIRECTED 100 each 11                Current Facility-Administered Medications on File Prior to Visit   Medication Dose Route Frequency Provider Last Rate Last Dose    dexamethasone injection 8 mg  8 mg Intramuscular Once Alejandro Parkinson MD          Review of patient's allergies indicates:   Allergen Reactions    Iodinated contrast- oral and iv dye Hives       Tachycardia    Omeprazole Hives and Itching    Prilosec [omeprazole magnesium] Hives and Itching         Objective:      Physical Exam   Constitutional: He is oriented to person, place, and time. He appears well-developed and well-nourished.   HENT:   Head: Normocephalic and atraumatic.   Eyes: Conjunctivae are normal. Pupils are equal, round, and reactive to light.   Neck: Normal range of motion. Neck supple.   Cardiovascular: Normal rate, regular rhythm, normal heart sounds and intact distal pulses.   Pulmonary/Chest: Effort normal and breath sounds normal.   Abdominal: Soft. Bowel sounds are normal.   Neurological: He is alert and oriented to person, place, and time.   Skin: Skin is warm and dry.   Psychiatric: He has a normal mood and affect.   Nursing note and vitals reviewed.        Assessment:      1. Asthma with COPD    2. Essential hypertension    3. Coronary artery disease involving native coronary artery of native heart without angina pectoris    4. PVD (peripheral vascular disease)    5. Nonrheumatic aortic valve " stenosis    6. DELONTE on CPAP    7. Type 2 diabetes mellitus without complication, without long-term current use of insulin          Plan:      Asthma with COPD     Essential hypertension     Coronary artery disease involving native coronary artery of native heart without angina pectoris     PVD (peripheral vascular disease)     Nonrheumatic aortic valve stenosis     DELONTE on CPAP     Type 2 diabetes mellitus without complication, without long-term current use of insulin        LHC/RHC  Echo  Continue exforge, metoprolol, hctz-htn  Continue zetia-hlp  Continue asa- cad

## 2018-11-02 NOTE — HPI
The patient is a 73-year-old male who was worked up for complaints of dyspnea on exertion.  The patient was in his usual state of health until about a year ago when he noticed dyspnea on exertion on moderate to severe exertion.  The patient's symptoms have been progressively getting worse.  The patient denies orthopnea or PND.  He denies palpitations or dizziness.  The patient has mild ankle swelling right greater than left. He denies chest pain. He denies fever or chills.  But the patient has a chronic cough.  Cardiac catheterization done today shows critical left main stenosis.

## 2018-11-03 PROBLEM — E78.2 MIXED HYPERLIPIDEMIA: Status: ACTIVE | Noted: 2018-11-03

## 2018-11-03 LAB
ALBUMIN SERPL BCP-MCNC: 3.5 G/DL
ALP SERPL-CCNC: 64 U/L
ALT SERPL W/O P-5'-P-CCNC: 26 U/L
ANION GAP SERPL CALC-SCNC: 12 MMOL/L
AST SERPL-CCNC: 26 U/L
BASOPHILS # BLD AUTO: 0.01 K/UL
BASOPHILS # BLD AUTO: 0.02 K/UL
BASOPHILS NFR BLD: 0.1 %
BASOPHILS NFR BLD: 0.2 %
BILIRUB SERPL-MCNC: 0.6 MG/DL
BUN SERPL-MCNC: 15 MG/DL
CALCIUM SERPL-MCNC: 8.7 MG/DL
CHLORIDE SERPL-SCNC: 103 MMOL/L
CO2 SERPL-SCNC: 26 MMOL/L
CORONARY STENOSIS: ABNORMAL
CREAT SERPL-MCNC: 1.1 MG/DL
DIFFERENTIAL METHOD: ABNORMAL
DIFFERENTIAL METHOD: ABNORMAL
EOSINOPHIL # BLD AUTO: 0 K/UL
EOSINOPHIL # BLD AUTO: 0.1 K/UL
EOSINOPHIL NFR BLD: 0.3 %
EOSINOPHIL NFR BLD: 0.7 %
ERYTHROCYTE [DISTWIDTH] IN BLOOD BY AUTOMATED COUNT: 13.2 %
ERYTHROCYTE [DISTWIDTH] IN BLOOD BY AUTOMATED COUNT: 13.3 %
EST. GFR  (AFRICAN AMERICAN): >60 ML/MIN/1.73 M^2
EST. GFR  (NON AFRICAN AMERICAN): >60 ML/MIN/1.73 M^2
GLUCOSE SERPL-MCNC: 168 MG/DL
HCT VFR BLD AUTO: 37.4 %
HCT VFR BLD AUTO: 40.6 %
HGB BLD-MCNC: 13.2 G/DL
HGB BLD-MCNC: 14.3 G/DL
LYMPHOCYTES # BLD AUTO: 1.8 K/UL
LYMPHOCYTES # BLD AUTO: 3 K/UL
LYMPHOCYTES NFR BLD: 17.3 %
LYMPHOCYTES NFR BLD: 31.9 %
MCH RBC QN AUTO: 34.1 PG
MCH RBC QN AUTO: 34.1 PG
MCHC RBC AUTO-ENTMCNC: 35.2 G/DL
MCHC RBC AUTO-ENTMCNC: 35.3 G/DL
MCV RBC AUTO: 97 FL
MCV RBC AUTO: 97 FL
MONOCYTES # BLD AUTO: 0.6 K/UL
MONOCYTES # BLD AUTO: 0.9 K/UL
MONOCYTES NFR BLD: 6.1 %
MONOCYTES NFR BLD: 8.1 %
NEUTROPHILS # BLD AUTO: 5.8 K/UL
NEUTROPHILS # BLD AUTO: 7.7 K/UL
NEUTROPHILS NFR BLD: 61.1 %
NEUTROPHILS NFR BLD: 74.5 %
PLATELET # BLD AUTO: 123 K/UL
PLATELET # BLD AUTO: 141 K/UL
PMV BLD AUTO: 9.7 FL
PMV BLD AUTO: 9.8 FL
POCT GLUCOSE: 101 MG/DL (ref 70–110)
POCT GLUCOSE: 131 MG/DL (ref 70–110)
POCT GLUCOSE: 141 MG/DL (ref 70–110)
POCT GLUCOSE: 221 MG/DL (ref 70–110)
POTASSIUM SERPL-SCNC: 3.5 MMOL/L
PROT SERPL-MCNC: 7.4 G/DL
RBC # BLD AUTO: 3.87 M/UL
RBC # BLD AUTO: 4.19 M/UL
SODIUM SERPL-SCNC: 141 MMOL/L
WBC # BLD AUTO: 10.44 K/UL
WBC # BLD AUTO: 9.48 K/UL

## 2018-11-03 PROCEDURE — 94010 BREATHING CAPACITY TEST: CPT

## 2018-11-03 PROCEDURE — 25000003 PHARM REV CODE 250: Performed by: NURSE PRACTITIONER

## 2018-11-03 PROCEDURE — 94640 AIRWAY INHALATION TREATMENT: CPT

## 2018-11-03 PROCEDURE — 80053 COMPREHEN METABOLIC PANEL: CPT

## 2018-11-03 PROCEDURE — 25000242 PHARM REV CODE 250 ALT 637 W/ HCPCS: Performed by: INTERNAL MEDICINE

## 2018-11-03 PROCEDURE — 25000003 PHARM REV CODE 250: Performed by: INTERNAL MEDICINE

## 2018-11-03 PROCEDURE — 85025 COMPLETE CBC W/AUTO DIFF WBC: CPT

## 2018-11-03 PROCEDURE — 36415 COLL VENOUS BLD VENIPUNCTURE: CPT

## 2018-11-03 PROCEDURE — 94799 UNLISTED PULMONARY SVC/PX: CPT

## 2018-11-03 PROCEDURE — S5571 INSULIN DISPOS PEN 3 ML: HCPCS | Performed by: INTERNAL MEDICINE

## 2018-11-03 PROCEDURE — 99900031 HC PATIENT EDUCATION (STAT)

## 2018-11-03 PROCEDURE — 63600175 PHARM REV CODE 636 W HCPCS: Performed by: INTERNAL MEDICINE

## 2018-11-03 RX ORDER — ATORVASTATIN CALCIUM 40 MG/1
80 TABLET, FILM COATED ORAL DAILY
Status: DISCONTINUED | OUTPATIENT
Start: 2018-11-03 | End: 2018-11-05

## 2018-11-03 RX ORDER — ASPIRIN 81 MG/1
81 TABLET ORAL DAILY
Status: DISCONTINUED | OUTPATIENT
Start: 2018-11-03 | End: 2018-11-05

## 2018-11-03 RX ORDER — POTASSIUM CHLORIDE 20 MEQ/1
20 TABLET, EXTENDED RELEASE ORAL ONCE
Status: COMPLETED | OUTPATIENT
Start: 2018-11-03 | End: 2018-11-03

## 2018-11-03 RX ORDER — POLYETHYLENE GLYCOL 3350 17 G/17G
17 POWDER, FOR SOLUTION ORAL DAILY
Status: DISCONTINUED | OUTPATIENT
Start: 2018-11-03 | End: 2018-11-05

## 2018-11-03 RX ADMIN — FINASTERIDE 5 MG: 5 TABLET, FILM COATED ORAL at 08:11

## 2018-11-03 RX ADMIN — DOCUSATE SODIUM 100 MG: 100 CAPSULE, LIQUID FILLED ORAL at 09:11

## 2018-11-03 RX ADMIN — ARFORMOTEROL TARTRATE 15 MCG: 15 SOLUTION RESPIRATORY (INHALATION) at 07:11

## 2018-11-03 RX ADMIN — INSULIN DETEMIR 40 UNITS: 100 INJECTION, SOLUTION SUBCUTANEOUS at 08:11

## 2018-11-03 RX ADMIN — ASPIRIN 81 MG: 81 TABLET, COATED ORAL at 08:11

## 2018-11-03 RX ADMIN — ATORVASTATIN CALCIUM 80 MG: 40 TABLET, FILM COATED ORAL at 08:11

## 2018-11-03 RX ADMIN — ARFORMOTEROL TARTRATE 15 MCG: 15 SOLUTION RESPIRATORY (INHALATION) at 08:11

## 2018-11-03 RX ADMIN — METOPROLOL SUCCINATE 25 MG: 25 TABLET, EXTENDED RELEASE ORAL at 08:11

## 2018-11-03 RX ADMIN — BUDESONIDE 0.5 MG: 0.5 SUSPENSION RESPIRATORY (INHALATION) at 07:11

## 2018-11-03 RX ADMIN — MONTELUKAST SODIUM 10 MG: 10 TABLET, FILM COATED ORAL at 08:11

## 2018-11-03 RX ADMIN — AMLODIPINE BESYLATE 10 MG: 10 TABLET ORAL at 08:11

## 2018-11-03 RX ADMIN — INSULIN ASPART 4 UNITS: 100 INJECTION, SOLUTION INTRAVENOUS; SUBCUTANEOUS at 10:11

## 2018-11-03 RX ADMIN — VALSARTAN 160 MG: 80 TABLET, FILM COATED ORAL at 08:11

## 2018-11-03 RX ADMIN — SODIUM CHLORIDE: 0.9 INJECTION, SOLUTION INTRAVENOUS at 01:11

## 2018-11-03 RX ADMIN — POTASSIUM CHLORIDE 20 MEQ: 1500 TABLET, EXTENDED RELEASE ORAL at 04:11

## 2018-11-03 RX ADMIN — POLYETHYLENE GLYCOL 3350 17 G: 17 POWDER, FOR SOLUTION ORAL at 04:11

## 2018-11-03 RX ADMIN — NIACIN ER 500 MG TABLET,EXTENDED RELEASE 500 MG: at 08:11

## 2018-11-03 RX ADMIN — LEVOTHYROXINE SODIUM 137 MCG: 25 TABLET ORAL at 06:11

## 2018-11-03 RX ADMIN — DOCUSATE SODIUM 100 MG: 100 CAPSULE, LIQUID FILLED ORAL at 08:11

## 2018-11-03 RX ADMIN — HYDROCHLOROTHIAZIDE 12.5 MG: 12.5 TABLET ORAL at 08:11

## 2018-11-03 RX ADMIN — EZETIMIBE 10 MG: 10 TABLET ORAL at 08:11

## 2018-11-03 RX ADMIN — BUDESONIDE 0.5 MG: 0.5 SUSPENSION RESPIRATORY (INHALATION) at 08:11

## 2018-11-03 RX ADMIN — VALSARTAN 160 MG: 80 TABLET, FILM COATED ORAL at 09:11

## 2018-11-03 NOTE — PLAN OF CARE
Problem: Patient Care Overview  Goal: Plan of Care Review  Outcome: Ongoing (interventions implemented as appropriate)  POC reviewed, verbalized understanding. Pt remained free from falls, fall precautions in place. Pt is SB on monitor, 50s. VS monitored. Blood glucose monitored. No other c/o at this time. PIV intact, infusing NS. Call bell and personal belongings within reach. Hourly rounding complete. Reminded to call for assistance. Will continue to monitor.

## 2018-11-03 NOTE — HPI
Mr. Pretty is a 73 year old male who presented to [facility] [place]- due to LHC per Dr. Gallardo. R/LHC revealed significant LM involving LCX, ostial and LAD-ostial, normal LV function, Mild AS and CVT consulted. CVT recommended CABG on Monday, 11/5. Patient placed in outpatient recovery awaiting CABG for Monday.

## 2018-11-03 NOTE — PROGRESS NOTES
[facility] -   Cardiology  Progress Note    Patient Name: Erendira Pretty  MRN: [id number]  Admission Date: 11/2/2018  Hospital Length of Stay: 0 days  Code Status: Full Code   Attending Physician: Frank Gallardo MD   Primary Care Physician: Cortes Roblero MD  Expected Discharge Date:   Principal Problem:<principal problem not specified>    Subjective:   HPI  Mr. Pretty is a 73 year old male who presented to [facility] due to LHC per Dr. Gallardo. R/LHC revealed significant LM involving LCX, ostial and LAD-ostial, normal LV function, Mild AS and CVT consulted. CVT recommended CABG on Monday, 11/5. Patient placed in outpatient recovery awaiting CABG for Monday.     Hospital Course:   11/3/18-Patient seen and examined in room, lying in bed. Denies chest pain or shortness of breath today.     Interval History: no acute issues o/n    Review of Systems   Constitution: Negative for weakness.   HENT: Negative for hearing loss and hoarse voice.    Eyes: Negative for blurred vision and visual disturbance.   Cardiovascular: Negative for chest pain, claudication, dyspnea on exertion, irregular heartbeat, leg swelling, near-syncope, orthopnea, palpitations, paroxysmal nocturnal dyspnea and syncope.   Respiratory: Negative for cough, hemoptysis, shortness of breath, sleep disturbances due to breathing, snoring and wheezing.    Endocrine: Negative for cold intolerance and heat intolerance.   Hematologic/Lymphatic: Does not bruise/bleed easily.   Skin: Negative for color change, dry skin and nail changes.   Musculoskeletal: Positive for arthritis. Negative for back pain, joint pain and myalgias.   Gastrointestinal: Negative for bloating, abdominal pain, constipation, nausea and vomiting.   Genitourinary: Negative for dysuria, flank pain, hematuria and hesitancy.   Neurological: Negative for headaches, light-headedness, loss of balance, numbness and paresthesias.   Psychiatric/Behavioral: Negative for altered mental  status.   Allergic/Immunologic: Negative for environmental allergies.     Objective:     Vital Signs (Most Recent):  Temp: 97.9 °F (36.6 °C) (11/03/18 0701)  Pulse: 63 (11/03/18 0819)  Resp: 18 (11/03/18 0819)  BP: (!) 144/68 (11/03/18 0701)  SpO2: 98 % (11/03/18 0813) Vital Signs (24h Range):  Temp:  [97.5 °F (36.4 °C)-98.2 °F (36.8 °C)] 97.9 °F (36.6 °C)  Pulse:  [50-83] 63  Resp:  [13-24] 18  SpO2:  [95 %-99 %] 98 %  BP: (117-169)/(56-98) 144/68     Weight: 113.5 kg (250 lb 3.6 oz)  Body mass index is 38.05 kg/m².     SpO2: 98 %  O2 Device (Oxygen Therapy): room air      Intake/Output Summary (Last 24 hours) at 11/3/2018 0840  Last data filed at 11/3/2018 0701  Gross per 24 hour   Intake 240 ml   Output 2500 ml   Net -2260 ml       Lines/Drains/Airways     Peripheral Intravenous Line                 Peripheral IV - Single Lumen 11/02/18 2139 Posterior;Right Forearm less than 1 day                Physical Exam   Constitutional: He is oriented to person, place, and time. He appears well-developed and well-nourished. No distress.   HENT:   Head: Normocephalic and atraumatic.   Eyes: Pupils are equal, round, and reactive to light.   Neck: Normal range of motion and full passive range of motion without pain. Neck supple. No JVD present. No tracheal deviation present. No thyromegaly present.   Cardiovascular: Normal rate, regular rhythm, S1 normal, S2 normal and intact distal pulses. PMI is not displaced. Exam reveals no distant heart sounds.   No murmur heard.  Pulses:       Radial pulses are 2+ on the right side, and 2+ on the left side.        Dorsalis pedis pulses are 2+ on the right side, and 2+ on the left side.   Right groin access site C/D/I, no hematoma or ecchymosis or drainage noted.     Left groin access site C/D/I, no hematoma or ecchymosis or drainage noted.    Pulmonary/Chest: Effort normal and breath sounds normal. No accessory muscle usage. No respiratory distress. He has no decreased breath sounds. He  has no wheezes. He has no rales.   Abdominal: Soft. Bowel sounds are normal. He exhibits no distension. There is no tenderness.   Musculoskeletal: Normal range of motion. He exhibits no edema.        Right ankle: He exhibits no swelling.        Left ankle: He exhibits no swelling.   Neurological: He is alert and oriented to person, place, and time.   Skin: Skin is warm and dry. He is not diaphoretic. No cyanosis. Nails show no clubbing.   Psychiatric: He has a normal mood and affect. His speech is normal and behavior is normal. Judgment and thought content normal. Cognition and memory are normal.   Nursing note and vitals reviewed.      Significant Labs:   All pertinent lab results from the last 24 hours have been reviewed. and   Recent Lab Results       11/03/18  0611   11/02/18  2356   11/02/18  2100   11/02/18  1815   11/02/18  1655        Baso #   0.01           Basophil%   0.1           BNP       88  Comment:  Values of less than 100 pg/ml are consistent with non-CHF populations.       Differential Method   Automated           Eos #   0.0           Eosinophil%   0.3           Estimated Avg Glucose               Gran # (ANC)   7.7           Gran%   74.5           Hematocrit   37.4           Hemoglobin   13.2           Hemoglobin A1C               Lymph #   1.8           Lymph%   17.3           MCH   34.1           MCHC   35.3           MCV   97           Mono #   0.9           Mono%   8.1           MPV   9.8           Platelets   123           POCT Glucose 101   187   208     RBC   3.87           RDW   13.2           TSH       0.744       WBC   10.44                            11/02/18  1638   11/02/18  0911        Baso #         Basophil%         BNP         Differential Method         Eos #         Eosinophil%         Estimated Avg Glucose 131       Gran # (ANC)         Gran%         Hematocrit         Hemoglobin         Hemoglobin A1C 6.2  Comment:  ADA Screening Guidelines:  5.7-6.4%  Consistent with  prediabetes  >or=6.5%  Consistent with diabetes  High levels of fetal hemoglobin interfere with the HbA1C  assay. Heterozygous hemoglobin variants (HbS, HgC, etc)do  not significantly interfere with this assay.   However, presence of multiple variants may affect accuracy.         Lymph #         Lymph%         MCH         MCHC         MCV         Mono #         Mono%         MPV         Platelets         POCT Glucose   189     RBC         RDW         TSH         WBC               Significant Imaging: Echocardiogram:   2D echo with color flow doppler:   Results for orders placed or performed in visit on 10/25/18   2D echo with color flow doppler   Result Value Ref Range    Calculated EF 55 55 - 65    Diastolic Dysfunction No     Aortic Valve Stenosis MILD TO MODERATE (A)     Est. PA Systolic Pressure 27.04     Mitral Valve Mobility NORMAL     Tricuspid Valve Regurgitation MILD     and X-Ray: CXR: X-Ray Chest 1 View (CXR): No results found for this visit on 11/02/18.    Assessment and Plan:   .     Mixed hyperlipidemia    Continue statin and niacin     Left main coronary artery disease    R/LHC completed yesterday which revealed significant LM involving LCX, ostial and LAD-ostial, normal LV function, Mild AS with CVT consult for CABG  CVT consult for CABG on Monday, 11/5.   Continue ASA, Statin, BB, ARB  Denies chest pain or shortness of breath on exam.     Aortic stenosis    Mild AS per LHC yesterday     Obesity, unspecified    Encourage weight loss     Essential hypertension    On medical therapy  Continue BB and ARB         VTE Risk Mitigation (From admission, onward)        Ordered     IP VTE LOW RISK PATIENT  Once      11/02/18 1800     Place DHEERAJ hose  Until discontinued      11/02/18 1800     Place sequential compression device  Until discontinued      11/02/18 1800          Marii Link NP  Cardiology  [facility] [place]

## 2018-11-03 NOTE — ASSESSMENT & PLAN NOTE
R/LHC completed yesterday which revealed significant LM involving LCX, ostial and LAD-ostial, normal LV function, Mild AS with CVT consult for CABG  CVT consult for CABG on Monday, 11/5.   Continue ASA, Statin, BB, ARB  Denies chest pain or shortness of breath on exam.

## 2018-11-03 NOTE — SUBJECTIVE & OBJECTIVE
Interval History: no acute issues o/n    Review of Systems   Constitution: Negative for weakness.   HENT: Negative for hearing loss and hoarse voice.    Eyes: Negative for blurred vision and visual disturbance.   Cardiovascular: Negative for chest pain, claudication, dyspnea on exertion, irregular heartbeat, leg swelling, near-syncope, orthopnea, palpitations, paroxysmal nocturnal dyspnea and syncope.   Respiratory: Negative for cough, hemoptysis, shortness of breath, sleep disturbances due to breathing, snoring and wheezing.    Endocrine: Negative for cold intolerance and heat intolerance.   Hematologic/Lymphatic: Does not bruise/bleed easily.   Skin: Negative for color change, dry skin and nail changes.   Musculoskeletal: Positive for arthritis. Negative for back pain, joint pain and myalgias.   Gastrointestinal: Negative for bloating, abdominal pain, constipation, nausea and vomiting.   Genitourinary: Negative for dysuria, flank pain, hematuria and hesitancy.   Neurological: Negative for headaches, light-headedness, loss of balance, numbness and paresthesias.   Psychiatric/Behavioral: Negative for altered mental status.   Allergic/Immunologic: Negative for environmental allergies.     Objective:     Vital Signs (Most Recent):  Temp: 97.9 °F (36.6 °C) (11/03/18 0701)  Pulse: 63 (11/03/18 0819)  Resp: 18 (11/03/18 0819)  BP: (!) 144/68 (11/03/18 0701)  SpO2: 98 % (11/03/18 0813) Vital Signs (24h Range):  Temp:  [97.5 °F (36.4 °C)-98.2 °F (36.8 °C)] 97.9 °F (36.6 °C)  Pulse:  [50-83] 63  Resp:  [13-24] 18  SpO2:  [95 %-99 %] 98 %  BP: (117-169)/(56-98) 144/68     Weight: 113.5 kg (250 lb 3.6 oz)  Body mass index is 38.05 kg/m².     SpO2: 98 %  O2 Device (Oxygen Therapy): room air      Intake/Output Summary (Last 24 hours) at 11/3/2018 0840  Last data filed at 11/3/2018 0701  Gross per 24 hour   Intake 240 ml   Output 2500 ml   Net -2260 ml       Lines/Drains/Airways     Peripheral Intravenous Line                  Peripheral IV - Single Lumen 11/02/18 2139 Posterior;Right Forearm less than 1 day                Physical Exam   Constitutional: He is oriented to person, place, and time. He appears well-developed and well-nourished. No distress.   HENT:   Head: Normocephalic and atraumatic.   Eyes: Pupils are equal, round, and reactive to light.   Neck: Normal range of motion and full passive range of motion without pain. Neck supple. No JVD present. No tracheal deviation present. No thyromegaly present.   Cardiovascular: Normal rate, regular rhythm, S1 normal, S2 normal and intact distal pulses. PMI is not displaced. Exam reveals no distant heart sounds.   No murmur heard.  Pulses:       Radial pulses are 2+ on the right side, and 2+ on the left side.        Dorsalis pedis pulses are 2+ on the right side, and 2+ on the left side.   Right groin access site C/D/I, no hematoma or ecchymosis or drainage noted.     Left groin access site C/D/I, no hematoma or ecchymosis or drainage noted.    Pulmonary/Chest: Effort normal and breath sounds normal. No accessory muscle usage. No respiratory distress. He has no decreased breath sounds. He has no wheezes. He has no rales.   Abdominal: Soft. Bowel sounds are normal. He exhibits no distension. There is no tenderness.   Musculoskeletal: Normal range of motion. He exhibits no edema.        Right ankle: He exhibits no swelling.        Left ankle: He exhibits no swelling.   Neurological: He is alert and oriented to person, place, and time.   Skin: Skin is warm and dry. He is not diaphoretic. No cyanosis. Nails show no clubbing.   Psychiatric: He has a normal mood and affect. His speech is normal and behavior is normal. Judgment and thought content normal. Cognition and memory are normal.   Nursing note and vitals reviewed.      Significant Labs:   All pertinent lab results from the last 24 hours have been reviewed. and   Recent Lab Results       11/03/18  0611   11/02/18  2356   11/02/18  2100    11/02/18  1815   11/02/18  1655        Baso #   0.01           Basophil%   0.1           BNP       88  Comment:  Values of less than 100 pg/ml are consistent with non-CHF populations.       Differential Method   Automated           Eos #   0.0           Eosinophil%   0.3           Estimated Avg Glucose               Gran # (ANC)   7.7           Gran%   74.5           Hematocrit   37.4           Hemoglobin   13.2           Hemoglobin A1C               Lymph #   1.8           Lymph%   17.3           MCH   34.1           MCHC   35.3           MCV   97           Mono #   0.9           Mono%   8.1           MPV   9.8           Platelets   123           POCT Glucose 101   187   208     RBC   3.87           RDW   13.2           TSH       0.744       WBC   10.44                            11/02/18  1638   11/02/18  0911        Baso #         Basophil%         BNP         Differential Method         Eos #         Eosinophil%         Estimated Avg Glucose 131       Gran # (ANC)         Gran%         Hematocrit         Hemoglobin         Hemoglobin A1C 6.2  Comment:  ADA Screening Guidelines:  5.7-6.4%  Consistent with prediabetes  >or=6.5%  Consistent with diabetes  High levels of fetal hemoglobin interfere with the HbA1C  assay. Heterozygous hemoglobin variants (HbS, HgC, etc)do  not significantly interfere with this assay.   However, presence of multiple variants may affect accuracy.         Lymph #         Lymph%         MCH         MCHC         MCV         Mono #         Mono%         MPV         Platelets         POCT Glucose   189     RBC         RDW         TSH         WBC               Significant Imaging: Echocardiogram:   2D echo with color flow doppler:   Results for orders placed or performed in visit on 10/25/18   2D echo with color flow doppler   Result Value Ref Range    Calculated EF 55 55 - 65    Diastolic Dysfunction No     Aortic Valve Stenosis MILD TO MODERATE (A)     Est. PA Systolic Pressure 27.04      Mitral Valve Mobility NORMAL     Tricuspid Valve Regurgitation MILD     and X-Ray: CXR: X-Ray Chest 1 View (CXR): No results found for this visit on 11/02/18.

## 2018-11-03 NOTE — PLAN OF CARE
Problem: Patient Care Overview  Goal: Plan of Care Review  Outcome: Ongoing (interventions implemented as appropriate)  Pt AAO x4.  VSS.  Pt remained afebrile throughout this shift.   IV fluids administered per order.   Pt remained free of falls this shift.   Pt has no complaints of pain this shift.  Blood glucose monitored, corrected via SSI.  Plan of care reviewed. Patient verbalizes understanding.   Pt moving/turing independently. Frequent weight shifting encouraged.  Patient NSR with frequent PACs on monitor.   Pt is post heart cath on 11/2/18.   Plans for CABG surgery on 11/5/18.  Bed low, side rails up x 2, wheels locked, call light in reach.   Patient instructed to call for assistance.   Hourly rounding completed.   Will continue to monitor.

## 2018-11-03 NOTE — PROGRESS NOTES
Respiratory communication for ventilator education. IS pre-op and bedside spirometry orders completed.

## 2018-11-03 NOTE — HOSPITAL COURSE
11/3/18-Patient seen and examined in room, lying in bed. Denies chest pain or shortness of breath today.     11/4/18-Patient seen and examined in room, lying in bed. Denies chest pan or shortness of breath today. Labs reviewed, K+ 3.6, Cr 1.0. Pending CABG in AM.     11/5/18-Patient seen and examined, s/p CABG x 2 this AM. Remains stable CV wise.     11/6/18-Patient seen and examined today, POD #1 s/p CABG x 2. Sitting up in chair. Feels ok, complains of incisional pain. Labs stable.     11/7/18-Patient seen and examined today, POD # 2 s/p CABG x 2. Resting in bed. No complaints. Denies chest pain or SOB. Remains in SR. Labs stable.     11/8/18-Patient seen and examined today, POD # 3 s/p CABG x 2. Sitting up in chair. Denies chest pain or SOB. Ambulate yesterday and today without any issues. Remains in SR. Labs reviewed, H and H with drop 7.9/22.7.

## 2018-11-04 LAB
ABO + RH BLD: NORMAL
ALBUMIN SERPL BCP-MCNC: 3.4 G/DL
ALBUMIN SERPL BCP-MCNC: 3.5 G/DL
ALP SERPL-CCNC: 63 U/L
ALP SERPL-CCNC: 68 U/L
ALT SERPL W/O P-5'-P-CCNC: 29 U/L
ALT SERPL W/O P-5'-P-CCNC: 30 U/L
ANION GAP SERPL CALC-SCNC: 12 MMOL/L
ANION GAP SERPL CALC-SCNC: 12 MMOL/L
APTT BLDCRRT: 27.7 SEC
AST SERPL-CCNC: 27 U/L
AST SERPL-CCNC: 30 U/L
BASOPHILS # BLD AUTO: 0.02 K/UL
BASOPHILS # BLD AUTO: 0.02 K/UL
BASOPHILS NFR BLD: 0.3 %
BASOPHILS NFR BLD: 0.4 %
BILIRUB SERPL-MCNC: 0.5 MG/DL
BILIRUB SERPL-MCNC: 0.7 MG/DL
BILIRUB UR QL STRIP: NEGATIVE
BLD GP AB SCN CELLS X3 SERPL QL: NORMAL
BUN SERPL-MCNC: 12 MG/DL
BUN SERPL-MCNC: 13 MG/DL
CALCIUM SERPL-MCNC: 8.7 MG/DL
CALCIUM SERPL-MCNC: 9 MG/DL
CHLORIDE SERPL-SCNC: 103 MMOL/L
CHLORIDE SERPL-SCNC: 105 MMOL/L
CLARITY UR: CLEAR
CO2 SERPL-SCNC: 24 MMOL/L
CO2 SERPL-SCNC: 24 MMOL/L
COLOR UR: YELLOW
CREAT SERPL-MCNC: 1 MG/DL
CREAT SERPL-MCNC: 1 MG/DL
DIFFERENTIAL METHOD: ABNORMAL
DIFFERENTIAL METHOD: ABNORMAL
EOSINOPHIL # BLD AUTO: 0.1 K/UL
EOSINOPHIL # BLD AUTO: 0.1 K/UL
EOSINOPHIL NFR BLD: 1.5 %
EOSINOPHIL NFR BLD: 1.6 %
ERYTHROCYTE [DISTWIDTH] IN BLOOD BY AUTOMATED COUNT: 13.4 %
ERYTHROCYTE [DISTWIDTH] IN BLOOD BY AUTOMATED COUNT: 13.4 %
EST. GFR  (AFRICAN AMERICAN): >60 ML/MIN/1.73 M^2
EST. GFR  (AFRICAN AMERICAN): >60 ML/MIN/1.73 M^2
EST. GFR  (NON AFRICAN AMERICAN): >60 ML/MIN/1.73 M^2
EST. GFR  (NON AFRICAN AMERICAN): >60 ML/MIN/1.73 M^2
ESTIMATED AVG GLUCOSE: 131 MG/DL
GLUCOSE SERPL-MCNC: 113 MG/DL
GLUCOSE SERPL-MCNC: 182 MG/DL
GLUCOSE UR QL STRIP: NEGATIVE
HBA1C MFR BLD HPLC: 6.2 %
HCT VFR BLD AUTO: 38.5 %
HCT VFR BLD AUTO: 40.5 %
HGB BLD-MCNC: 13.5 G/DL
HGB BLD-MCNC: 14 G/DL
HGB UR QL STRIP: NEGATIVE
INR PPP: 1.1
KETONES UR QL STRIP: NEGATIVE
LEUKOCYTE ESTERASE UR QL STRIP: NEGATIVE
LYMPHOCYTES # BLD AUTO: 1.6 K/UL
LYMPHOCYTES # BLD AUTO: 1.9 K/UL
LYMPHOCYTES NFR BLD: 23.6 %
LYMPHOCYTES NFR BLD: 34.1 %
MCH RBC QN AUTO: 33.4 PG
MCH RBC QN AUTO: 34 PG
MCHC RBC AUTO-ENTMCNC: 34.6 G/DL
MCHC RBC AUTO-ENTMCNC: 35.1 G/DL
MCV RBC AUTO: 97 FL
MCV RBC AUTO: 97 FL
MONOCYTES # BLD AUTO: 0.5 K/UL
MONOCYTES # BLD AUTO: 0.6 K/UL
MONOCYTES NFR BLD: 7.9 %
MONOCYTES NFR BLD: 9.9 %
NEUTROPHILS # BLD AUTO: 3 K/UL
NEUTROPHILS # BLD AUTO: 4.5 K/UL
NEUTROPHILS NFR BLD: 54 %
NEUTROPHILS NFR BLD: 66.7 %
NITRITE UR QL STRIP: NEGATIVE
PH UR STRIP: 6 [PH] (ref 5–8)
PLATELET # BLD AUTO: 102 K/UL
PLATELET # BLD AUTO: 126 K/UL
PMV BLD AUTO: 9.3 FL
PMV BLD AUTO: 9.7 FL
POCT GLUCOSE: 126 MG/DL (ref 70–110)
POCT GLUCOSE: 150 MG/DL (ref 70–110)
POCT GLUCOSE: 170 MG/DL (ref 70–110)
POCT GLUCOSE: 97 MG/DL (ref 70–110)
POTASSIUM SERPL-SCNC: 3.6 MMOL/L
POTASSIUM SERPL-SCNC: 3.8 MMOL/L
PREALB SERPL-MCNC: 26 MG/DL
PROT SERPL-MCNC: 6.9 G/DL
PROT SERPL-MCNC: 7.3 G/DL
PROT UR QL STRIP: NEGATIVE
PROTHROMBIN TIME: 10.9 SEC
RBC # BLD AUTO: 3.97 M/UL
RBC # BLD AUTO: 4.19 M/UL
SODIUM SERPL-SCNC: 139 MMOL/L
SODIUM SERPL-SCNC: 141 MMOL/L
SP GR UR STRIP: 1.01 (ref 1–1.03)
URN SPEC COLLECT METH UR: NORMAL
UROBILINOGEN UR STRIP-ACNC: NEGATIVE EU/DL
WBC # BLD AUTO: 5.58 K/UL
WBC # BLD AUTO: 6.81 K/UL

## 2018-11-04 PROCEDURE — 25000003 PHARM REV CODE 250: Performed by: NURSE PRACTITIONER

## 2018-11-04 PROCEDURE — 94799 UNLISTED PULMONARY SVC/PX: CPT

## 2018-11-04 PROCEDURE — 25000242 PHARM REV CODE 250 ALT 637 W/ HCPCS: Performed by: INTERNAL MEDICINE

## 2018-11-04 PROCEDURE — 83036 HEMOGLOBIN GLYCOSYLATED A1C: CPT

## 2018-11-04 PROCEDURE — 85610 PROTHROMBIN TIME: CPT

## 2018-11-04 PROCEDURE — 85025 COMPLETE CBC W/AUTO DIFF WBC: CPT

## 2018-11-04 PROCEDURE — 80053 COMPREHEN METABOLIC PANEL: CPT

## 2018-11-04 PROCEDURE — 80053 COMPREHEN METABOLIC PANEL: CPT | Mod: 91

## 2018-11-04 PROCEDURE — 85730 THROMBOPLASTIN TIME PARTIAL: CPT

## 2018-11-04 PROCEDURE — 86901 BLOOD TYPING SEROLOGIC RH(D): CPT

## 2018-11-04 PROCEDURE — 81003 URINALYSIS AUTO W/O SCOPE: CPT

## 2018-11-04 PROCEDURE — 84134 ASSAY OF PREALBUMIN: CPT

## 2018-11-04 PROCEDURE — 94760 N-INVAS EAR/PLS OXIMETRY 1: CPT

## 2018-11-04 PROCEDURE — 36415 COLL VENOUS BLD VENIPUNCTURE: CPT

## 2018-11-04 PROCEDURE — 94640 AIRWAY INHALATION TREATMENT: CPT

## 2018-11-04 PROCEDURE — 25000003 PHARM REV CODE 250: Performed by: INTERNAL MEDICINE

## 2018-11-04 RX ORDER — CHLORHEXIDINE GLUCONATE ORAL RINSE 1.2 MG/ML
10 SOLUTION DENTAL
Status: DISCONTINUED | OUTPATIENT
Start: 2018-11-04 | End: 2018-11-05 | Stop reason: HOSPADM

## 2018-11-04 RX ORDER — METOPROLOL TARTRATE 25 MG/1
25 TABLET, FILM COATED ORAL
Status: COMPLETED | OUTPATIENT
Start: 2018-11-04 | End: 2018-11-05

## 2018-11-04 RX ORDER — CEFAZOLIN SODIUM 2 G/50ML
2 SOLUTION INTRAVENOUS
Status: DISCONTINUED | OUTPATIENT
Start: 2018-11-04 | End: 2018-11-05 | Stop reason: HOSPADM

## 2018-11-04 RX ADMIN — NIACIN ER 500 MG TABLET,EXTENDED RELEASE 500 MG: at 08:11

## 2018-11-04 RX ADMIN — ARFORMOTEROL TARTRATE 15 MCG: 15 SOLUTION RESPIRATORY (INHALATION) at 07:11

## 2018-11-04 RX ADMIN — BUDESONIDE 0.5 MG: 0.5 SUSPENSION RESPIRATORY (INHALATION) at 08:11

## 2018-11-04 RX ADMIN — MONTELUKAST SODIUM 10 MG: 10 TABLET, FILM COATED ORAL at 08:11

## 2018-11-04 RX ADMIN — INSULIN DETEMIR 40 UNITS: 100 INJECTION, SOLUTION SUBCUTANEOUS at 08:11

## 2018-11-04 RX ADMIN — EZETIMIBE 10 MG: 10 TABLET ORAL at 08:11

## 2018-11-04 RX ADMIN — HYDROCHLOROTHIAZIDE 12.5 MG: 12.5 TABLET ORAL at 08:11

## 2018-11-04 RX ADMIN — VALSARTAN 160 MG: 80 TABLET, FILM COATED ORAL at 08:11

## 2018-11-04 RX ADMIN — AMLODIPINE BESYLATE 10 MG: 10 TABLET ORAL at 08:11

## 2018-11-04 RX ADMIN — ATORVASTATIN CALCIUM 80 MG: 40 TABLET, FILM COATED ORAL at 08:11

## 2018-11-04 RX ADMIN — VALSARTAN 160 MG: 80 TABLET, FILM COATED ORAL at 09:11

## 2018-11-04 RX ADMIN — INSULIN ASPART 2 UNITS: 100 INJECTION, SOLUTION INTRAVENOUS; SUBCUTANEOUS at 04:11

## 2018-11-04 RX ADMIN — ASPIRIN 81 MG: 81 TABLET, COATED ORAL at 08:11

## 2018-11-04 RX ADMIN — BUDESONIDE 0.5 MG: 0.5 SUSPENSION RESPIRATORY (INHALATION) at 07:11

## 2018-11-04 RX ADMIN — METOPROLOL SUCCINATE 25 MG: 25 TABLET, EXTENDED RELEASE ORAL at 08:11

## 2018-11-04 RX ADMIN — DOCUSATE SODIUM 100 MG: 100 CAPSULE, LIQUID FILLED ORAL at 09:11

## 2018-11-04 RX ADMIN — DOCUSATE SODIUM 100 MG: 100 CAPSULE, LIQUID FILLED ORAL at 08:11

## 2018-11-04 RX ADMIN — FINASTERIDE 5 MG: 5 TABLET, FILM COATED ORAL at 08:11

## 2018-11-04 RX ADMIN — POLYETHYLENE GLYCOL 3350 17 G: 17 POWDER, FOR SOLUTION ORAL at 08:11

## 2018-11-04 RX ADMIN — LEVOTHYROXINE SODIUM 137 MCG: 25 TABLET ORAL at 06:11

## 2018-11-04 RX ADMIN — ARFORMOTEROL TARTRATE 15 MCG: 15 SOLUTION RESPIRATORY (INHALATION) at 08:11

## 2018-11-04 NOTE — PLAN OF CARE
Problem: Patient Care Overview  Goal: Plan of Care Review  Outcome: Ongoing (interventions implemented as appropriate)  Pt AAO x4.  VSS.  Pt remained afebrile throughout this shift.   Pt remained free of falls this shift.   Pt has no complaints of pain this shift.  Blood glucose monitored, corrected via SSI.  Plan of care reviewed. Patient verbalizes understanding.   Pt moving/turing independently. Frequent weight shifting encouraged.  Patient NSR with frequent PACs on monitor.   Pt is post heart cath on 11/2/18.   Plans for CABG surgery on 11/5/18, pre-op orders completed as ordered.  Bed low, side rails up x 2, wheels locked, call light in reach.   Patient instructed to call for assistance.   Hourly rounding completed.   Will continue to monitor.

## 2018-11-04 NOTE — SUBJECTIVE & OBJECTIVE
Interval History: no acute issues o/n    Review of Systems   Constitution: Negative for weakness.   HENT: Negative for hearing loss and hoarse voice.    Eyes: Negative for blurred vision and visual disturbance.   Cardiovascular: Negative for chest pain, claudication, dyspnea on exertion, irregular heartbeat, leg swelling, near-syncope, orthopnea, palpitations, paroxysmal nocturnal dyspnea and syncope.   Respiratory: Negative for cough, hemoptysis, shortness of breath, sleep disturbances due to breathing, snoring and wheezing.    Endocrine: Negative for cold intolerance and heat intolerance.   Hematologic/Lymphatic: Does not bruise/bleed easily.   Skin: Negative for color change, dry skin and nail changes.   Musculoskeletal: Positive for arthritis. Negative for back pain, joint pain and myalgias.   Gastrointestinal: Negative for bloating, abdominal pain, constipation, nausea and vomiting.   Genitourinary: Negative for dysuria, flank pain, hematuria and hesitancy.   Neurological: Negative for headaches, light-headedness, loss of balance, numbness and paresthesias.   Psychiatric/Behavioral: Negative for altered mental status.   Allergic/Immunologic: Negative for environmental allergies.     Objective:     Vital Signs (Most Recent):  Temp: 98.1 °F (36.7 °C) (11/04/18 0704)  Pulse: 79 (11/04/18 0900)  Resp: 18 (11/04/18 0840)  BP: (!) 153/90 (11/04/18 0704)  SpO2: 99 % (11/04/18 0840) Vital Signs (24h Range):  Temp:  [97.8 °F (36.6 °C)-98.5 °F (36.9 °C)] 98.1 °F (36.7 °C)  Pulse:  [56-84] 79  Resp:  [18] 18  SpO2:  [95 %-99 %] 99 %  BP: (111-153)/(58-90) 153/90     Weight: 113.5 kg (250 lb 3.6 oz)  Body mass index is 38.05 kg/m².     SpO2: 99 %  O2 Device (Oxygen Therapy): room air      Intake/Output Summary (Last 24 hours) at 11/4/2018 1140  Last data filed at 11/4/2018 0556  Gross per 24 hour   Intake 3770 ml   Output 2850 ml   Net 920 ml       Lines/Drains/Airways     Peripheral Intravenous Line                  Peripheral IV - Single Lumen 11/02/18 2139 Posterior;Right Forearm 1 day                Physical Exam   Constitutional: He is oriented to person, place, and time. He appears well-developed and well-nourished. No distress.   HENT:   Head: Normocephalic and atraumatic.   Eyes: Pupils are equal, round, and reactive to light.   Neck: Normal range of motion and full passive range of motion without pain. Neck supple. No JVD present. No tracheal deviation present. No thyromegaly present.   Cardiovascular: Normal rate, regular rhythm, S1 normal, S2 normal and intact distal pulses. PMI is not displaced. Exam reveals no distant heart sounds.   No murmur heard.  Pulses:       Radial pulses are 2+ on the right side, and 2+ on the left side.        Dorsalis pedis pulses are 2+ on the right side, and 2+ on the left side.   Right groin access site C/D/I, no hematoma or ecchymosis or drainage noted.     Left groin access site C/D/I, no hematoma or ecchymosis or drainage noted.    Pulmonary/Chest: Effort normal and breath sounds normal. No accessory muscle usage. No respiratory distress. He has no decreased breath sounds. He has no wheezes. He has no rales.   Abdominal: Soft. Bowel sounds are normal. He exhibits no distension. There is no tenderness.   Musculoskeletal: Normal range of motion. He exhibits no edema.        Right ankle: He exhibits no swelling.        Left ankle: He exhibits no swelling.   Neurological: He is alert and oriented to person, place, and time.   Skin: Skin is warm and dry. He is not diaphoretic. No cyanosis. Nails show no clubbing.   Psychiatric: He has a normal mood and affect. His speech is normal and behavior is normal. Judgment and thought content normal. Cognition and memory are normal.   Nursing note and vitals reviewed.      Significant Labs:   All pertinent lab results from the last 24 hours have been reviewed. and   Recent Lab Results       11/04/18  1055   11/04/18  1016   11/04/18  0600    11/04/18 0228 11/03/18  2105        Albumin   3.5   3.4       Alkaline Phosphatase   68   63       ALT   30   29       Anion Gap   12   12       aPTT   27.7  Comment:  aPTT therapeutic range = 39-69 seconds           AST   30   27       Baso #   0.02   0.02       Basophil%   0.3   0.4       Total Bilirubin   0.7  Comment:  For infants and newborns, interpretation of results should be based  on gestational age, weight and in agreement with clinical  observations.  Premature Infant recommended reference ranges:  Up to 24 hours.............<8.0 mg/dL  Up to 48 hours............<12.0 mg/dL  3-5 days..................<15.0 mg/dL  6-29 days.................<15.0 mg/dL     0.5  Comment:  For infants and newborns, interpretation of results should be based  on gestational age, weight and in agreement with clinical  observations.  Premature Infant recommended reference ranges:  Up to 24 hours.............<8.0 mg/dL  Up to 48 hours............<12.0 mg/dL  3-5 days..................<15.0 mg/dL  6-29 days.................<15.0 mg/dL         BUN, Bld   12   13       Calcium   9.0   8.7       Chloride   103   105       CO2   24   24       Creatinine   1.0   1.0       Differential Method   Automated   Automated       eGFR if    >60   >60       eGFR if non    >60  Comment:  Calculation used to obtain the estimated glomerular filtration  rate (eGFR) is the CKD-EPI equation.      >60  Comment:  Calculation used to obtain the estimated glomerular filtration  rate (eGFR) is the CKD-EPI equation.          Eos #   0.1   0.1       Eosinophil%   1.5   1.6       Glucose   182   113       Gran # (ANC)   4.5   3.0       Gran%   66.7   54.0       Hematocrit   40.5   38.5       Hemoglobin   14.0   13.5       Coumadin Monitoring INR   1.1  Comment:  Coumadin Therapy:  2.0 - 3.0 for INR for all indicators except mechanical heart valves  and antiphospholipid syndromes which should use 2.5 - 3.5.             Lymph  #   1.6   1.9       Lymph%   23.6   34.1       MCH   33.4   34.0       MCHC   34.6   35.1       MCV   97   97       Mono #   0.5   0.6       Mono%   7.9   9.9       MPV   9.7   9.3       Platelets   126   102       POCT Glucose 150   97   141     Potassium   3.6   3.8       Total Protein   7.3   6.9       Protime   10.9           RBC   4.19   3.97       RDW   13.4   13.4       Sodium   139   141       WBC   6.81   5.58                        11/03/18  1638        Albumin       Alkaline Phosphatase       ALT       Anion Gap       aPTT       AST       Baso #       Basophil%       Total Bilirubin       BUN, Bld       Calcium       Chloride       CO2       Creatinine       Differential Method       eGFR if        eGFR if non        Eos #       Eosinophil%       Glucose       Gran # (ANC)       Gran%       Hematocrit       Hemoglobin       Coumadin Monitoring INR       Lymph #       Lymph%       MCH       MCHC       MCV       Mono #       Mono%       MPV       Platelets       POCT Glucose 131     Potassium       Total Protein       Protime       RBC       RDW       Sodium       WBC             Significant Imaging: Echocardiogram:   2D echo with color flow doppler:   Results for orders placed or performed in visit on 10/25/18   2D echo with color flow doppler   Result Value Ref Range    Calculated EF 55 55 - 65    Diastolic Dysfunction No     Aortic Valve Stenosis MILD TO MODERATE (A)     Est. PA Systolic Pressure 27.04     Mitral Valve Mobility NORMAL     Tricuspid Valve Regurgitation MILD     and X-Ray: CXR: X-Ray Chest 1 View (CXR): No results found for this visit on 11/02/18.

## 2018-11-04 NOTE — PROGRESS NOTES
[facility] -   Cardiology  Progress Note    Patient Name: Erendira Pretty  MRN: [id number]  Admission Date: 11/2/2018  Hospital Length of Stay: 0 days  Code Status: Full Code   Attending Physician: Frank Gallardo MD   Primary Care Physician: Cortes Roblero MD  Expected Discharge Date:   Principal Problem:<principal problem not specified>    Subjective:     Hospital Course:   11/3/18-Patient seen and examined in room, lying in bed. Denies chest pain or shortness of breath today.     11/4/18-Patient seen and examined in room, lying in bed. Denies chest pan or shortness of breath today. Labs reviewed, K+ 3.6, Cr 1.0. Pending CABG in AM.     Interval History: no acute issues o/n    Review of Systems   Constitution: Negative for weakness.   HENT: Negative for hearing loss and hoarse voice.    Eyes: Negative for blurred vision and visual disturbance.   Cardiovascular: Negative for chest pain, claudication, dyspnea on exertion, irregular heartbeat, leg swelling, near-syncope, orthopnea, palpitations, paroxysmal nocturnal dyspnea and syncope.   Respiratory: Negative for cough, hemoptysis, shortness of breath, sleep disturbances due to breathing, snoring and wheezing.    Endocrine: Negative for cold intolerance and heat intolerance.   Hematologic/Lymphatic: Does not bruise/bleed easily.   Skin: Negative for color change, dry skin and nail changes.   Musculoskeletal: Positive for arthritis. Negative for back pain, joint pain and myalgias.   Gastrointestinal: Negative for bloating, abdominal pain, constipation, nausea and vomiting.   Genitourinary: Negative for dysuria, flank pain, hematuria and hesitancy.   Neurological: Negative for headaches, light-headedness, loss of balance, numbness and paresthesias.   Psychiatric/Behavioral: Negative for altered mental status.   Allergic/Immunologic: Negative for environmental allergies.     Objective:     Vital Signs (Most Recent):  Temp: 98.1 °F (36.7 °C) (11/04/18  0704)  Pulse: 79 (11/04/18 0900)  Resp: 18 (11/04/18 0840)  BP: (!) 153/90 (11/04/18 0704)  SpO2: 99 % (11/04/18 0840) Vital Signs (24h Range):  Temp:  [97.8 °F (36.6 °C)-98.5 °F (36.9 °C)] 98.1 °F (36.7 °C)  Pulse:  [56-84] 79  Resp:  [18] 18  SpO2:  [95 %-99 %] 99 %  BP: (111-153)/(58-90) 153/90     Weight: 113.5 kg (250 lb 3.6 oz)  Body mass index is 38.05 kg/m².     SpO2: 99 %  O2 Device (Oxygen Therapy): room air      Intake/Output Summary (Last 24 hours) at 11/4/2018 1140  Last data filed at 11/4/2018 0556  Gross per 24 hour   Intake 3770 ml   Output 2850 ml   Net 920 ml       Lines/Drains/Airways     Peripheral Intravenous Line                 Peripheral IV - Single Lumen 11/02/18 2139 Posterior;Right Forearm 1 day                Physical Exam   Constitutional: He is oriented to person, place, and time. He appears well-developed and well-nourished. No distress.   HENT:   Head: Normocephalic and atraumatic.   Eyes: Pupils are equal, round, and reactive to light.   Neck: Normal range of motion and full passive range of motion without pain. Neck supple. No JVD present. No tracheal deviation present. No thyromegaly present.   Cardiovascular: Normal rate, regular rhythm, S1 normal, S2 normal and intact distal pulses. PMI is not displaced. Exam reveals no distant heart sounds.   No murmur heard.  Pulses:       Radial pulses are 2+ on the right side, and 2+ on the left side.        Dorsalis pedis pulses are 2+ on the right side, and 2+ on the left side.   Right groin access site C/D/I, no hematoma or ecchymosis or drainage noted.     Left groin access site C/D/I, no hematoma or ecchymosis or drainage noted.    Pulmonary/Chest: Effort normal and breath sounds normal. No accessory muscle usage. No respiratory distress. He has no decreased breath sounds. He has no wheezes. He has no rales.   Abdominal: Soft. Bowel sounds are normal. He exhibits no distension. There is no tenderness.   Musculoskeletal: Normal range of  motion. He exhibits no edema.        Right ankle: He exhibits no swelling.        Left ankle: He exhibits no swelling.   Neurological: He is alert and oriented to person, place, and time.   Skin: Skin is warm and dry. He is not diaphoretic. No cyanosis. Nails show no clubbing.   Psychiatric: He has a normal mood and affect. His speech is normal and behavior is normal. Judgment and thought content normal. Cognition and memory are normal.   Nursing note and vitals reviewed.      Significant Labs:   All pertinent lab results from the last 24 hours have been reviewed. and   Recent Lab Results       11/04/18  1055   11/04/18  1016   11/04/18  0600   11/04/18  0228   11/03/18  2105        Albumin   3.5   3.4       Alkaline Phosphatase   68   63       ALT   30   29       Anion Gap   12   12       aPTT   27.7  Comment:  aPTT therapeutic range = 39-69 seconds           AST   30   27       Baso #   0.02   0.02       Basophil%   0.3   0.4       Total Bilirubin   0.7  Comment:  For infants and newborns, interpretation of results should be based  on gestational age, weight and in agreement with clinical  observations.  Premature Infant recommended reference ranges:  Up to 24 hours.............<8.0 mg/dL  Up to 48 hours............<12.0 mg/dL  3-5 days..................<15.0 mg/dL  6-29 days.................<15.0 mg/dL     0.5  Comment:  For infants and newborns, interpretation of results should be based  on gestational age, weight and in agreement with clinical  observations.  Premature Infant recommended reference ranges:  Up to 24 hours.............<8.0 mg/dL  Up to 48 hours............<12.0 mg/dL  3-5 days..................<15.0 mg/dL  6-29 days.................<15.0 mg/dL         BUN, Bld   12   13       Calcium   9.0   8.7       Chloride   103   105       CO2   24   24       Creatinine   1.0   1.0       Differential Method   Automated   Automated       eGFR if    >60   >60       eGFR if non     >60  Comment:  Calculation used to obtain the estimated glomerular filtration  rate (eGFR) is the CKD-EPI equation.      >60  Comment:  Calculation used to obtain the estimated glomerular filtration  rate (eGFR) is the CKD-EPI equation.          Eos #   0.1   0.1       Eosinophil%   1.5   1.6       Glucose   182   113       Gran # (ANC)   4.5   3.0       Gran%   66.7   54.0       Hematocrit   40.5   38.5       Hemoglobin   14.0   13.5       Coumadin Monitoring INR   1.1  Comment:  Coumadin Therapy:  2.0 - 3.0 for INR for all indicators except mechanical heart valves  and antiphospholipid syndromes which should use 2.5 - 3.5.             Lymph #   1.6   1.9       Lymph%   23.6   34.1       MCH   33.4   34.0       MCHC   34.6   35.1       MCV   97   97       Mono #   0.5   0.6       Mono%   7.9   9.9       MPV   9.7   9.3       Platelets   126   102       POCT Glucose 150   97   141     Potassium   3.6   3.8       Total Protein   7.3   6.9       Protime   10.9           RBC   4.19   3.97       RDW   13.4   13.4       Sodium   139   141       WBC   6.81   5.58                        11/03/18  1638        Albumin       Alkaline Phosphatase       ALT       Anion Gap       aPTT       AST       Baso #       Basophil%       Total Bilirubin       BUN, Bld       Calcium       Chloride       CO2       Creatinine       Differential Method       eGFR if        eGFR if non        Eos #       Eosinophil%       Glucose       Gran # (ANC)       Gran%       Hematocrit       Hemoglobin       Coumadin Monitoring INR       Lymph #       Lymph%       MCH       MCHC       MCV       Mono #       Mono%       MPV       Platelets       POCT Glucose 131     Potassium       Total Protein       Protime       RBC       RDW       Sodium       WBC             Significant Imaging: Echocardiogram:   2D echo with color flow doppler:   Results for orders placed or performed in visit on 10/25/18   2D echo with color flow  doppler   Result Value Ref Range    Calculated EF 55 55 - 65    Diastolic Dysfunction No     Aortic Valve Stenosis MILD TO MODERATE (A)     Est. PA Systolic Pressure 27.04     Mitral Valve Mobility NORMAL     Tricuspid Valve Regurgitation MILD     and X-Ray: CXR: X-Ray Chest 1 View (CXR): No results found for this visit on 11/02/18.    Assessment and Plan:       Mixed hyperlipidemia    Continue statin and niacin     Left main coronary artery disease    R/LHC completed yesterday which revealed significant LM involving LCX, ostial and LAD-ostial, normal LV function, Mild AS with CVT consult for CABG  CVT consult for CABG on Monday, 11/5.   Continue ASA, Statin, BB, ARB  Denies chest pain or shortness of breath on exam.     Aortic stenosis    Mild AS per LHC yesterday     Obesity, unspecified    Encourage weight loss     Essential hypertension    On medical therapy  Continue BB and ARB         VTE Risk Mitigation (From admission, onward)        Ordered     IP VTE LOW RISK PATIENT  Once      11/02/18 1800     Place DHEERAJ hose  Until discontinued      11/02/18 1800     Place sequential compression device  Until discontinued      11/02/18 1800          Marii Link NP  Cardiology  [facility] [place]

## 2018-11-04 NOTE — PLAN OF CARE
Problem: Patient Care Overview  Goal: Plan of Care Review  Outcome: Ongoing (interventions implemented as appropriate)  POC reviewed, verbalized understanding. Pt remained free from falls, fall precautions in place. Pt is SB-NSR on monitor, 50-70s. VSS. No other c/o at this time. PIV intact, saline locked. Blood glucose monitored. Call bell and personal belongings within reach. Hourly rounding complete. Reminded to call for assistance. Will continue to monitor.

## 2018-11-05 ENCOUNTER — ANESTHESIA (OUTPATIENT)
Dept: SURGERY | Facility: HOSPITAL | Age: 73
DRG: 234 | End: 2018-11-05
Payer: MEDICARE

## 2018-11-05 ENCOUNTER — ANESTHESIA EVENT (OUTPATIENT)
Dept: SURGERY | Facility: HOSPITAL | Age: 73
DRG: 234 | End: 2018-11-05
Payer: MEDICARE

## 2018-11-05 PROBLEM — D62 ACUTE BLOOD LOSS ANEMIA: Status: ACTIVE | Noted: 2018-11-05

## 2018-11-05 PROBLEM — Z99.11 ON MECHANICALLY ASSISTED VENTILATION: Status: ACTIVE | Noted: 2018-11-05

## 2018-11-05 PROBLEM — Z95.1 S/P CABG X 2: Status: ACTIVE | Noted: 2018-11-05

## 2018-11-05 LAB
ALLENS TEST: ABNORMAL
ANION GAP SERPL CALC-SCNC: 10 MMOL/L
ANION GAP SERPL CALC-SCNC: 10 MMOL/L
ANION GAP SERPL CALC-SCNC: 14 MMOL/L
APTT BLDCRRT: 32.6 SEC
APTT BLDCRRT: 34.6 SEC
BASOPHILS # BLD AUTO: 0.01 K/UL
BASOPHILS # BLD AUTO: 0.01 K/UL
BASOPHILS # BLD AUTO: 0.03 K/UL
BASOPHILS NFR BLD: 0.1 %
BASOPHILS NFR BLD: 0.1 %
BASOPHILS NFR BLD: 0.5 %
BUN SERPL-MCNC: 11 MG/DL
BUN SERPL-MCNC: 11 MG/DL
BUN SERPL-MCNC: 13 MG/DL
CALCIUM SERPL-MCNC: 7.8 MG/DL
CALCIUM SERPL-MCNC: 7.8 MG/DL
CALCIUM SERPL-MCNC: 8.7 MG/DL
CHLORIDE SERPL-SCNC: 108 MMOL/L
CHLORIDE SERPL-SCNC: 109 MMOL/L
CHLORIDE SERPL-SCNC: 109 MMOL/L
CO2 SERPL-SCNC: 18 MMOL/L
CO2 SERPL-SCNC: 21 MMOL/L
CO2 SERPL-SCNC: 21 MMOL/L
CREAT SERPL-MCNC: 0.9 MG/DL
CREAT SERPL-MCNC: 0.9 MG/DL
CREAT SERPL-MCNC: 1.3 MG/DL
D DIMER PPP IA.FEU-MCNC: 2.79 MG/L FEU
DELSYS: ABNORMAL
DIFFERENTIAL METHOD: ABNORMAL
EOSINOPHIL # BLD AUTO: 0 K/UL
EOSINOPHIL # BLD AUTO: 0 K/UL
EOSINOPHIL # BLD AUTO: 0.1 K/UL
EOSINOPHIL NFR BLD: 0.1 %
EOSINOPHIL NFR BLD: 0.4 %
EOSINOPHIL NFR BLD: 2 %
ERYTHROCYTE [DISTWIDTH] IN BLOOD BY AUTOMATED COUNT: 13 %
ERYTHROCYTE [DISTWIDTH] IN BLOOD BY AUTOMATED COUNT: 13.1 %
ERYTHROCYTE [DISTWIDTH] IN BLOOD BY AUTOMATED COUNT: 13.2 %
ERYTHROCYTE [DISTWIDTH] IN BLOOD BY AUTOMATED COUNT: 13.3 %
ERYTHROCYTE [SEDIMENTATION RATE] IN BLOOD BY WESTERGREN METHOD: 20 MM/H
EST. GFR  (AFRICAN AMERICAN): >60 ML/MIN/1.73 M^2
EST. GFR  (NON AFRICAN AMERICAN): 55 ML/MIN/1.73 M^2
EST. GFR  (NON AFRICAN AMERICAN): >60 ML/MIN/1.73 M^2
EST. GFR  (NON AFRICAN AMERICAN): >60 ML/MIN/1.73 M^2
FIBRINOGEN PPP-MCNC: 178 MG/DL
FIBRINOGEN PPP-MCNC: 212 MG/DL
FIO2: 100
GLUCOSE SERPL-MCNC: 132 MG/DL (ref 70–110)
GLUCOSE SERPL-MCNC: 140 MG/DL (ref 70–110)
GLUCOSE SERPL-MCNC: 155 MG/DL (ref 70–110)
GLUCOSE SERPL-MCNC: 159 MG/DL
GLUCOSE SERPL-MCNC: 159 MG/DL
GLUCOSE SERPL-MCNC: 161 MG/DL (ref 70–110)
GLUCOSE SERPL-MCNC: 161 MG/DL (ref 70–110)
GLUCOSE SERPL-MCNC: 163 MG/DL (ref 70–110)
GLUCOSE SERPL-MCNC: 166 MG/DL (ref 70–110)
GLUCOSE SERPL-MCNC: 176 MG/DL (ref 70–110)
GLUCOSE SERPL-MCNC: 285 MG/DL
HCO3 UR-SCNC: 25.7 MMOL/L (ref 24–28)
HCO3 UR-SCNC: 26 MMOL/L (ref 24–28)
HCO3 UR-SCNC: 26.3 MMOL/L (ref 24–28)
HCO3 UR-SCNC: 28.6 MMOL/L (ref 24–28)
HCO3 UR-SCNC: 28.7 MMOL/L (ref 24–28)
HCO3 UR-SCNC: 29 MMOL/L (ref 24–28)
HCO3 UR-SCNC: 29.9 MMOL/L (ref 24–28)
HCO3 UR-SCNC: 30 MMOL/L (ref 24–28)
HCT VFR BLD AUTO: 27.1 %
HCT VFR BLD AUTO: 28.4 %
HCT VFR BLD AUTO: 29.1 %
HCT VFR BLD AUTO: 38.6 %
HCT VFR BLD CALC: 25 %PCV (ref 36–54)
HCT VFR BLD CALC: 26 %PCV (ref 36–54)
HCT VFR BLD CALC: 27 %PCV (ref 36–54)
HCT VFR BLD CALC: 34 %PCV (ref 36–54)
HCT VFR BLD CALC: 36 %PCV (ref 36–54)
HGB BLD-MCNC: 10 G/DL
HGB BLD-MCNC: 10.3 G/DL
HGB BLD-MCNC: 13.5 G/DL
HGB BLD-MCNC: 9.6 G/DL
INR PPP: 1.2
INR PPP: 1.4
LYMPHOCYTES # BLD AUTO: 0.6 K/UL
LYMPHOCYTES # BLD AUTO: 1.2 K/UL
LYMPHOCYTES # BLD AUTO: 1.9 K/UL
LYMPHOCYTES NFR BLD: 11.8 %
LYMPHOCYTES NFR BLD: 29.1 %
LYMPHOCYTES NFR BLD: 6 %
MAGNESIUM SERPL-MCNC: 2.5 MG/DL
MAGNESIUM SERPL-MCNC: 3.4 MG/DL
MCH RBC QN AUTO: 33.6 PG
MCH RBC QN AUTO: 33.8 PG
MCH RBC QN AUTO: 33.9 PG
MCH RBC QN AUTO: 33.9 PG
MCHC RBC AUTO-ENTMCNC: 35 G/DL
MCHC RBC AUTO-ENTMCNC: 35.2 G/DL
MCHC RBC AUTO-ENTMCNC: 35.4 G/DL
MCHC RBC AUTO-ENTMCNC: 35.4 G/DL
MCV RBC AUTO: 95 FL
MCV RBC AUTO: 96 FL
MODE: ABNORMAL
MONOCYTES # BLD AUTO: 0.6 K/UL
MONOCYTES # BLD AUTO: 0.7 K/UL
MONOCYTES # BLD AUTO: 0.8 K/UL
MONOCYTES NFR BLD: 7 %
MONOCYTES NFR BLD: 7.8 %
MONOCYTES NFR BLD: 9.7 %
NEUTROPHILS # BLD AUTO: 3.8 K/UL
NEUTROPHILS # BLD AUTO: 8.3 K/UL
NEUTROPHILS # BLD AUTO: 8.3 K/UL
NEUTROPHILS NFR BLD: 58.7 %
NEUTROPHILS NFR BLD: 79.9 %
NEUTROPHILS NFR BLD: 87.2 %
PCO2 BLDA: 40.4 MMHG (ref 35–45)
PCO2 BLDA: 41.4 MMHG (ref 35–45)
PCO2 BLDA: 43 MMHG (ref 35–45)
PCO2 BLDA: 43.9 MMHG (ref 35–45)
PCO2 BLDA: 44.1 MMHG (ref 35–45)
PCO2 BLDA: 45.2 MMHG (ref 35–45)
PCO2 BLDA: 46.2 MMHG (ref 35–45)
PCO2 BLDA: 48.6 MMHG (ref 35–45)
PEEP: 5
PH SMN: 7.37 [PH] (ref 7.35–7.45)
PH SMN: 7.38 [PH] (ref 7.35–7.45)
PH SMN: 7.38 [PH] (ref 7.35–7.45)
PH SMN: 7.41 [PH] (ref 7.35–7.45)
PH SMN: 7.41 [PH] (ref 7.35–7.45)
PH SMN: 7.42 [PH] (ref 7.35–7.45)
PH SMN: 7.45 [PH] (ref 7.35–7.45)
PH SMN: 7.46 [PH] (ref 7.35–7.45)
PLATELET # BLD AUTO: 103 K/UL
PLATELET # BLD AUTO: 106 K/UL
PLATELET # BLD AUTO: 128 K/UL
PLATELET # BLD AUTO: 82 K/UL
PLATELET BLD QL SMEAR: ABNORMAL
PMV BLD AUTO: 9.5 FL
PMV BLD AUTO: 9.6 FL
PMV BLD AUTO: 9.7 FL
PMV BLD AUTO: 9.7 FL
PO2 BLDA: 101 MMHG (ref 80–100)
PO2 BLDA: 145 MMHG (ref 80–100)
PO2 BLDA: 217 MMHG (ref 80–100)
PO2 BLDA: 226 MMHG (ref 80–100)
PO2 BLDA: 253 MMHG (ref 80–100)
PO2 BLDA: 268 MMHG (ref 80–100)
PO2 BLDA: 323 MMHG (ref 80–100)
PO2 BLDA: 42 MMHG (ref 40–60)
POC ACTIVATED CLOTTING TIME K: 136 SEC (ref 74–137)
POC ACTIVATED CLOTTING TIME K: 142 SEC (ref 74–137)
POC ACTIVATED CLOTTING TIME K: 455 SEC (ref 74–137)
POC ACTIVATED CLOTTING TIME K: 621 SEC (ref 74–137)
POC ACTIVATED CLOTTING TIME K: 632 SEC (ref 74–137)
POC ACTIVATED CLOTTING TIME K: 648 SEC (ref 74–137)
POC ACTIVATED CLOTTING TIME K: 725 SEC (ref 74–137)
POC BE: 0 MMOL/L
POC BE: 1 MMOL/L
POC BE: 1 MMOL/L
POC BE: 4 MMOL/L
POC BE: 4 MMOL/L
POC BE: 5 MMOL/L
POC BE: 5 MMOL/L
POC BE: 6 MMOL/L
POC IONIZED CALCIUM: 0.97 MMOL/L (ref 1.06–1.42)
POC IONIZED CALCIUM: 1.03 MMOL/L (ref 1.06–1.42)
POC IONIZED CALCIUM: 1.04 MMOL/L (ref 1.06–1.42)
POC IONIZED CALCIUM: 1.04 MMOL/L (ref 1.06–1.42)
POC IONIZED CALCIUM: 1.11 MMOL/L (ref 1.06–1.42)
POC IONIZED CALCIUM: 1.12 MMOL/L (ref 1.06–1.42)
POC IONIZED CALCIUM: 1.14 MMOL/L (ref 1.06–1.42)
POC IONIZED CALCIUM: 1.17 MMOL/L (ref 1.06–1.42)
POC SATURATED O2: 100 % (ref 95–100)
POC SATURATED O2: 78 % (ref 95–100)
POC SATURATED O2: 98 % (ref 95–100)
POC SATURATED O2: 99 % (ref 95–100)
POCT GLUCOSE: 135 MG/DL (ref 70–110)
POCT GLUCOSE: 150 MG/DL (ref 70–110)
POCT GLUCOSE: 201 MG/DL (ref 70–110)
POCT GLUCOSE: 202 MG/DL (ref 70–110)
POCT GLUCOSE: 222 MG/DL (ref 70–110)
POCT GLUCOSE: 239 MG/DL (ref 70–110)
POCT GLUCOSE: 246 MG/DL (ref 70–110)
POCT GLUCOSE: 255 MG/DL (ref 70–110)
POCT GLUCOSE: 258 MG/DL (ref 70–110)
POCT GLUCOSE: 264 MG/DL (ref 70–110)
POTASSIUM BLD-SCNC: 3.1 MMOL/L (ref 3.5–5.1)
POTASSIUM BLD-SCNC: 3.6 MMOL/L (ref 3.5–5.1)
POTASSIUM BLD-SCNC: 4 MMOL/L (ref 3.5–5.1)
POTASSIUM BLD-SCNC: 4.4 MMOL/L (ref 3.5–5.1)
POTASSIUM BLD-SCNC: 4.7 MMOL/L (ref 3.5–5.1)
POTASSIUM BLD-SCNC: 4.7 MMOL/L (ref 3.5–5.1)
POTASSIUM BLD-SCNC: 4.8 MMOL/L (ref 3.5–5.1)
POTASSIUM BLD-SCNC: 4.8 MMOL/L (ref 3.5–5.1)
POTASSIUM SERPL-SCNC: 4.4 MMOL/L
POTASSIUM SERPL-SCNC: 4.9 MMOL/L
POTASSIUM SERPL-SCNC: 4.9 MMOL/L
PROTHROMBIN TIME: 12.5 SEC
PROTHROMBIN TIME: 13.9 SEC
RBC # BLD AUTO: 2.83 M/UL
RBC # BLD AUTO: 2.95 M/UL
RBC # BLD AUTO: 3.05 M/UL
RBC # BLD AUTO: 4.02 M/UL
SAMPLE: ABNORMAL
SAMPLE: NORMAL
SITE: ABNORMAL
SODIUM BLD-SCNC: 137 MMOL/L (ref 136–145)
SODIUM BLD-SCNC: 138 MMOL/L (ref 136–145)
SODIUM BLD-SCNC: 139 MMOL/L (ref 136–145)
SODIUM BLD-SCNC: 139 MMOL/L (ref 136–145)
SODIUM BLD-SCNC: 140 MMOL/L (ref 136–145)
SODIUM BLD-SCNC: 141 MMOL/L (ref 136–145)
SODIUM BLD-SCNC: 141 MMOL/L (ref 136–145)
SODIUM BLD-SCNC: 142 MMOL/L (ref 136–145)
SODIUM SERPL-SCNC: 140 MMOL/L
VT: 450
WBC # BLD AUTO: 10.38 K/UL
WBC # BLD AUTO: 10.69 K/UL
WBC # BLD AUTO: 6.52 K/UL
WBC # BLD AUTO: 9.52 K/UL

## 2018-11-05 PROCEDURE — P9045 ALBUMIN (HUMAN), 5%, 250 ML: HCPCS | Mod: JG | Performed by: NURSE ANESTHETIST, CERTIFIED REGISTERED

## 2018-11-05 PROCEDURE — 85384 FIBRINOGEN ACTIVITY: CPT | Mod: 91

## 2018-11-05 PROCEDURE — C1729 CATH, DRAINAGE: HCPCS | Performed by: THORACIC SURGERY (CARDIOTHORACIC VASCULAR SURGERY)

## 2018-11-05 PROCEDURE — 25000003 PHARM REV CODE 250: Performed by: NURSE ANESTHETIST, CERTIFIED REGISTERED

## 2018-11-05 PROCEDURE — 85025 COMPLETE CBC W/AUTO DIFF WBC: CPT

## 2018-11-05 PROCEDURE — 021009W BYPASS CORONARY ARTERY, ONE ARTERY FROM AORTA WITH AUTOLOGOUS VENOUS TISSUE, OPEN APPROACH: ICD-10-PCS | Performed by: THORACIC SURGERY (CARDIOTHORACIC VASCULAR SURGERY)

## 2018-11-05 PROCEDURE — 33517 CABG ARTERY-VEIN SINGLE: CPT | Mod: ,,, | Performed by: THORACIC SURGERY (CARDIOTHORACIC VASCULAR SURGERY)

## 2018-11-05 PROCEDURE — 84295 ASSAY OF SERUM SODIUM: CPT

## 2018-11-05 PROCEDURE — C1751 CATH, INF, PER/CENT/MIDLINE: HCPCS | Performed by: THORACIC SURGERY (CARDIOTHORACIC VASCULAR SURGERY)

## 2018-11-05 PROCEDURE — 63600175 PHARM REV CODE 636 W HCPCS: Performed by: NURSE ANESTHETIST, CERTIFIED REGISTERED

## 2018-11-05 PROCEDURE — 99900017 HC EXTUBATION W/PARAMETERS (STAT)

## 2018-11-05 PROCEDURE — 85610 PROTHROMBIN TIME: CPT | Mod: 91

## 2018-11-05 PROCEDURE — 27201423 OPTIME MED/SURG SUP & DEVICES STERILE SUPPLY: Performed by: THORACIC SURGERY (CARDIOTHORACIC VASCULAR SURGERY)

## 2018-11-05 PROCEDURE — 25000003 PHARM REV CODE 250

## 2018-11-05 PROCEDURE — 85379 FIBRIN DEGRADATION QUANT: CPT

## 2018-11-05 PROCEDURE — 63600175 PHARM REV CODE 636 W HCPCS: Performed by: THORACIC SURGERY (CARDIOTHORACIC VASCULAR SURGERY)

## 2018-11-05 PROCEDURE — 99291 CRITICAL CARE FIRST HOUR: CPT | Mod: ,,, | Performed by: INTERNAL MEDICINE

## 2018-11-05 PROCEDURE — 06BQ4ZZ EXCISION OF LEFT SAPHENOUS VEIN, PERCUTANEOUS ENDOSCOPIC APPROACH: ICD-10-PCS | Performed by: THORACIC SURGERY (CARDIOTHORACIC VASCULAR SURGERY)

## 2018-11-05 PROCEDURE — 33999 UNLISTED PX CARDIAC SURGERY: CPT | Mod: ,,, | Performed by: THORACIC SURGERY (CARDIOTHORACIC VASCULAR SURGERY)

## 2018-11-05 PROCEDURE — 94640 AIRWAY INHALATION TREATMENT: CPT

## 2018-11-05 PROCEDURE — 25000242 PHARM REV CODE 250 ALT 637 W/ HCPCS: Performed by: THORACIC SURGERY (CARDIOTHORACIC VASCULAR SURGERY)

## 2018-11-05 PROCEDURE — 82330 ASSAY OF CALCIUM: CPT

## 2018-11-05 PROCEDURE — 85730 THROMBOPLASTIN TIME PARTIAL: CPT

## 2018-11-05 PROCEDURE — 33508 ENDOSCOPIC VEIN HARVEST: CPT | Mod: ,,, | Performed by: THORACIC SURGERY (CARDIOTHORACIC VASCULAR SURGERY)

## 2018-11-05 PROCEDURE — 25000003 PHARM REV CODE 250: Performed by: ANESTHESIOLOGY

## 2018-11-05 PROCEDURE — 99291 CRITICAL CARE FIRST HOUR: CPT | Mod: ,,, | Performed by: NURSE PRACTITIONER

## 2018-11-05 PROCEDURE — P9045 ALBUMIN (HUMAN), 5%, 250 ML: HCPCS | Mod: JG | Performed by: THORACIC SURGERY (CARDIOTHORACIC VASCULAR SURGERY)

## 2018-11-05 PROCEDURE — 27000221 HC OXYGEN, UP TO 24 HOURS

## 2018-11-05 PROCEDURE — 88311 DECALCIFY TISSUE: CPT | Performed by: PATHOLOGY

## 2018-11-05 PROCEDURE — 5A1221Z PERFORMANCE OF CARDIAC OUTPUT, CONTINUOUS: ICD-10-PCS | Performed by: THORACIC SURGERY (CARDIOTHORACIC VASCULAR SURGERY)

## 2018-11-05 PROCEDURE — 94799 UNLISTED PULMONARY SVC/PX: CPT

## 2018-11-05 PROCEDURE — 36415 COLL VENOUS BLD VENIPUNCTURE: CPT

## 2018-11-05 PROCEDURE — 02100Z9 BYPASS CORONARY ARTERY, ONE ARTERY FROM LEFT INTERNAL MAMMARY, OPEN APPROACH: ICD-10-PCS | Performed by: THORACIC SURGERY (CARDIOTHORACIC VASCULAR SURGERY)

## 2018-11-05 PROCEDURE — 36000713 HC OR TIME LEV V EA ADD 15 MIN: Performed by: THORACIC SURGERY (CARDIOTHORACIC VASCULAR SURGERY)

## 2018-11-05 PROCEDURE — 63600175 PHARM REV CODE 636 W HCPCS

## 2018-11-05 PROCEDURE — 25000242 PHARM REV CODE 250 ALT 637 W/ HCPCS: Performed by: INTERNAL MEDICINE

## 2018-11-05 PROCEDURE — 85027 COMPLETE CBC AUTOMATED: CPT

## 2018-11-05 PROCEDURE — 99900035 HC TECH TIME PER 15 MIN (STAT)

## 2018-11-05 PROCEDURE — 25000003 PHARM REV CODE 250: Performed by: INTERNAL MEDICINE

## 2018-11-05 PROCEDURE — 85730 THROMBOPLASTIN TIME PARTIAL: CPT | Mod: 91

## 2018-11-05 PROCEDURE — 36000712 HC OR TIME LEV V 1ST 15 MIN: Performed by: THORACIC SURGERY (CARDIOTHORACIC VASCULAR SURGERY)

## 2018-11-05 PROCEDURE — 71000028 HC RECOVERY HIGH ACUITY/ PER HOUR

## 2018-11-05 PROCEDURE — 88304 TISSUE EXAM BY PATHOLOGIST: CPT | Mod: 26,,, | Performed by: PATHOLOGY

## 2018-11-05 PROCEDURE — S0017 INJECTION, AMINOCAPROIC ACID: HCPCS

## 2018-11-05 PROCEDURE — C9290 INJ, BUPIVACAINE LIPOSOME: HCPCS | Performed by: THORACIC SURGERY (CARDIOTHORACIC VASCULAR SURGERY)

## 2018-11-05 PROCEDURE — 27200667 HC PACEMAKER, TEMPORARY MONITORING, PER SHIFT

## 2018-11-05 PROCEDURE — 83735 ASSAY OF MAGNESIUM: CPT | Mod: 91

## 2018-11-05 PROCEDURE — 80048 BASIC METABOLIC PNL TOTAL CA: CPT | Mod: 91

## 2018-11-05 PROCEDURE — 37000009 HC ANESTHESIA EA ADD 15 MINS: Performed by: THORACIC SURGERY (CARDIOTHORACIC VASCULAR SURGERY)

## 2018-11-05 PROCEDURE — 20000000 HC ICU ROOM

## 2018-11-05 PROCEDURE — S0017 INJECTION, AMINOCAPROIC ACID: HCPCS | Performed by: NURSE ANESTHETIST, CERTIFIED REGISTERED

## 2018-11-05 PROCEDURE — 84132 ASSAY OF SERUM POTASSIUM: CPT

## 2018-11-05 PROCEDURE — 94002 VENT MGMT INPAT INIT DAY: CPT

## 2018-11-05 PROCEDURE — A4216 STERILE WATER/SALINE, 10 ML: HCPCS | Performed by: NURSE ANESTHETIST, CERTIFIED REGISTERED

## 2018-11-05 PROCEDURE — 85014 HEMATOCRIT: CPT

## 2018-11-05 PROCEDURE — 85384 FIBRINOGEN ACTIVITY: CPT

## 2018-11-05 PROCEDURE — 33533 CABG ARTERIAL SINGLE: CPT | Mod: ,,, | Performed by: THORACIC SURGERY (CARDIOTHORACIC VASCULAR SURGERY)

## 2018-11-05 PROCEDURE — 37000008 HC ANESTHESIA 1ST 15 MINUTES: Performed by: THORACIC SURGERY (CARDIOTHORACIC VASCULAR SURGERY)

## 2018-11-05 PROCEDURE — 25000003 PHARM REV CODE 250: Performed by: THORACIC SURGERY (CARDIOTHORACIC VASCULAR SURGERY)

## 2018-11-05 PROCEDURE — 04C00ZZ EXTIRPATION OF MATTER FROM ABDOMINAL AORTA, OPEN APPROACH: ICD-10-PCS | Performed by: THORACIC SURGERY (CARDIOTHORACIC VASCULAR SURGERY)

## 2018-11-05 PROCEDURE — P9047 ALBUMIN (HUMAN), 25%, 50ML: HCPCS | Mod: JG

## 2018-11-05 PROCEDURE — 88311 DECALCIFY TISSUE: CPT | Mod: 26,,, | Performed by: PATHOLOGY

## 2018-11-05 PROCEDURE — 82803 BLOOD GASES ANY COMBINATION: CPT

## 2018-11-05 PROCEDURE — 37799 UNLISTED PX VASCULAR SURGERY: CPT

## 2018-11-05 PROCEDURE — 85610 PROTHROMBIN TIME: CPT

## 2018-11-05 RX ORDER — FUROSEMIDE 10 MG/ML
40 INJECTION INTRAMUSCULAR; INTRAVENOUS 2 TIMES DAILY
Status: DISCONTINUED | OUTPATIENT
Start: 2018-11-06 | End: 2018-11-08 | Stop reason: HOSPADM

## 2018-11-05 RX ORDER — CALCIUM CHLORIDE INJECTION 100 MG/ML
INJECTION, SOLUTION INTRAVENOUS
Status: COMPLETED
Start: 2018-11-05 | End: 2018-11-05

## 2018-11-05 RX ORDER — MAGNESIUM SULFATE 1 G/100ML
1 INJECTION INTRAVENOUS
Status: DISCONTINUED | OUTPATIENT
Start: 2018-11-05 | End: 2018-11-06 | Stop reason: HOSPADM

## 2018-11-05 RX ORDER — OXYCODONE HYDROCHLORIDE 5 MG/1
5 TABLET ORAL EVERY 4 HOURS PRN
Status: DISCONTINUED | OUTPATIENT
Start: 2018-11-05 | End: 2018-11-08 | Stop reason: HOSPADM

## 2018-11-05 RX ORDER — ALBUMIN HUMAN 50 G/1000ML
SOLUTION INTRAVENOUS CONTINUOUS PRN
Status: DISCONTINUED | OUTPATIENT
Start: 2018-11-05 | End: 2018-11-05

## 2018-11-05 RX ORDER — ONDANSETRON 2 MG/ML
4 INJECTION INTRAMUSCULAR; INTRAVENOUS EVERY 12 HOURS PRN
Status: DISCONTINUED | OUTPATIENT
Start: 2018-11-05 | End: 2018-11-08 | Stop reason: HOSPADM

## 2018-11-05 RX ORDER — HEPARIN SODIUM 1000 [USP'U]/ML
INJECTION, SOLUTION INTRAVENOUS; SUBCUTANEOUS
Status: DISCONTINUED | OUTPATIENT
Start: 2018-11-05 | End: 2018-11-05 | Stop reason: HOSPADM

## 2018-11-05 RX ORDER — PAPAVERINE HYDROCHLORIDE 30 MG/ML
INJECTION INTRAMUSCULAR; INTRAVENOUS
Status: DISCONTINUED | OUTPATIENT
Start: 2018-11-05 | End: 2018-11-05 | Stop reason: HOSPADM

## 2018-11-05 RX ORDER — POTASSIUM CHLORIDE 29.8 MG/ML
40 INJECTION INTRAVENOUS
Status: DISCONTINUED | OUTPATIENT
Start: 2018-11-05 | End: 2018-11-06 | Stop reason: HOSPADM

## 2018-11-05 RX ORDER — FAMOTIDINE 20 MG/1
20 TABLET, FILM COATED ORAL 2 TIMES DAILY
Status: DISCONTINUED | OUTPATIENT
Start: 2018-11-06 | End: 2018-11-08 | Stop reason: HOSPADM

## 2018-11-05 RX ORDER — POLYETHYLENE GLYCOL 3350 17 G/17G
17 POWDER, FOR SOLUTION ORAL DAILY
Status: DISCONTINUED | OUTPATIENT
Start: 2018-11-06 | End: 2018-11-08 | Stop reason: HOSPADM

## 2018-11-05 RX ORDER — LIDOCAINE HYDROCHLORIDE 10 MG/ML
INJECTION INFILTRATION; PERINEURAL
Status: DISCONTINUED | OUTPATIENT
Start: 2018-11-05 | End: 2018-11-05

## 2018-11-05 RX ORDER — NICARDIPINE HYDROCHLORIDE 0.2 MG/ML
1 INJECTION INTRAVENOUS CONTINUOUS
Status: DISCONTINUED | OUTPATIENT
Start: 2018-11-05 | End: 2018-11-05

## 2018-11-05 RX ORDER — POTASSIUM CHLORIDE 14.9 MG/ML
20 INJECTION INTRAVENOUS
Status: DISCONTINUED | OUTPATIENT
Start: 2018-11-05 | End: 2018-11-06 | Stop reason: HOSPADM

## 2018-11-05 RX ORDER — METOPROLOL TARTRATE 25 MG/1
25 TABLET, FILM COATED ORAL 2 TIMES DAILY
Status: DISCONTINUED | OUTPATIENT
Start: 2018-11-06 | End: 2018-11-06

## 2018-11-05 RX ORDER — SODIUM CHLORIDE, SODIUM LACTATE, POTASSIUM CHLORIDE, CALCIUM CHLORIDE 600; 310; 30; 20 MG/100ML; MG/100ML; MG/100ML; MG/100ML
INJECTION, SOLUTION INTRAVENOUS CONTINUOUS PRN
Status: DISCONTINUED | OUTPATIENT
Start: 2018-11-05 | End: 2018-11-05

## 2018-11-05 RX ORDER — CEFAZOLIN SODIUM 1 G/3ML
INJECTION, POWDER, FOR SOLUTION INTRAMUSCULAR; INTRAVENOUS
Status: DISCONTINUED | OUTPATIENT
Start: 2018-11-05 | End: 2018-11-05

## 2018-11-05 RX ORDER — PROPOFOL 10 MG/ML
VIAL (ML) INTRAVENOUS
Status: DISCONTINUED | OUTPATIENT
Start: 2018-11-05 | End: 2018-11-05

## 2018-11-05 RX ORDER — IPRATROPIUM BROMIDE AND ALBUTEROL SULFATE 2.5; .5 MG/3ML; MG/3ML
3 SOLUTION RESPIRATORY (INHALATION) EVERY 4 HOURS
Status: COMPLETED | OUTPATIENT
Start: 2018-11-05 | End: 2018-11-06

## 2018-11-05 RX ORDER — FENTANYL CITRATE 50 UG/ML
50 INJECTION, SOLUTION INTRAMUSCULAR; INTRAVENOUS
Status: DISCONTINUED | OUTPATIENT
Start: 2018-11-05 | End: 2018-11-06 | Stop reason: RX

## 2018-11-05 RX ORDER — ROCURONIUM BROMIDE 10 MG/ML
INJECTION, SOLUTION INTRAVENOUS
Status: DISCONTINUED | OUTPATIENT
Start: 2018-11-05 | End: 2018-11-05

## 2018-11-05 RX ORDER — CHLORHEXIDINE GLUCONATE ORAL RINSE 1.2 MG/ML
10 SOLUTION DENTAL 2 TIMES DAILY
Status: DISCONTINUED | OUTPATIENT
Start: 2018-11-05 | End: 2018-11-08 | Stop reason: HOSPADM

## 2018-11-05 RX ORDER — LIDOCAINE HYDROCHLORIDE 10 MG/ML
INJECTION, SOLUTION EPIDURAL; INFILTRATION; INTRACAUDAL; PERINEURAL
Status: COMPLETED | OUTPATIENT
Start: 2018-11-05 | End: 2018-11-05

## 2018-11-05 RX ORDER — CEFAZOLIN SODIUM 2 G/50ML
2 SOLUTION INTRAVENOUS
Status: COMPLETED | OUTPATIENT
Start: 2018-11-05 | End: 2018-11-05

## 2018-11-05 RX ORDER — NITROGLYCERIN 5 MG/ML
INJECTION, SOLUTION INTRAVENOUS
Status: DISCONTINUED | OUTPATIENT
Start: 2018-11-05 | End: 2018-11-05

## 2018-11-05 RX ORDER — AMINOCAPROIC ACID 250 MG/ML
INJECTION, SOLUTION INTRAVENOUS
Status: DISCONTINUED | OUTPATIENT
Start: 2018-11-05 | End: 2018-11-05

## 2018-11-05 RX ORDER — OXYCODONE HYDROCHLORIDE 5 MG/1
10 TABLET ORAL EVERY 4 HOURS PRN
Status: DISCONTINUED | OUTPATIENT
Start: 2018-11-05 | End: 2018-11-08 | Stop reason: HOSPADM

## 2018-11-05 RX ORDER — ALBUMIN HUMAN 50 G/1000ML
250 SOLUTION INTRAVENOUS
Status: DISCONTINUED | OUTPATIENT
Start: 2018-11-05 | End: 2018-11-06 | Stop reason: HOSPADM

## 2018-11-05 RX ORDER — PROTAMINE SULFATE 10 MG/ML
INJECTION, SOLUTION INTRAVENOUS
Status: DISCONTINUED | OUTPATIENT
Start: 2018-11-05 | End: 2018-11-05

## 2018-11-05 RX ORDER — METOCLOPRAMIDE HYDROCHLORIDE 5 MG/ML
5 INJECTION INTRAMUSCULAR; INTRAVENOUS EVERY 6 HOURS PRN
Status: DISCONTINUED | OUTPATIENT
Start: 2018-11-05 | End: 2018-11-08 | Stop reason: HOSPADM

## 2018-11-05 RX ORDER — DOCUSATE SODIUM 100 MG/1
100 CAPSULE, LIQUID FILLED ORAL 2 TIMES DAILY
Status: DISCONTINUED | OUTPATIENT
Start: 2018-11-06 | End: 2018-11-08 | Stop reason: HOSPADM

## 2018-11-05 RX ORDER — DEXTROSE, SODIUM CHLORIDE, SODIUM LACTATE, POTASSIUM CHLORIDE, AND CALCIUM CHLORIDE 5; .6; .31; .03; .02 G/100ML; G/100ML; G/100ML; G/100ML; G/100ML
INJECTION, SOLUTION INTRAVENOUS CONTINUOUS
Status: DISCONTINUED | OUTPATIENT
Start: 2018-11-05 | End: 2018-11-06

## 2018-11-05 RX ORDER — DEXMEDETOMIDINE HYDROCHLORIDE 4 UG/ML
0.2 INJECTION, SOLUTION INTRAVENOUS CONTINUOUS
Status: DISCONTINUED | OUTPATIENT
Start: 2018-11-05 | End: 2018-11-06

## 2018-11-05 RX ORDER — MIDAZOLAM HYDROCHLORIDE 1 MG/ML
INJECTION, SOLUTION INTRAMUSCULAR; INTRAVENOUS
Status: DISCONTINUED | OUTPATIENT
Start: 2018-11-05 | End: 2018-11-05

## 2018-11-05 RX ORDER — NICARDIPINE HYDROCHLORIDE 0.2 MG/ML
1 INJECTION INTRAVENOUS CONTINUOUS
Status: DISCONTINUED | OUTPATIENT
Start: 2018-11-05 | End: 2018-11-05 | Stop reason: SDUPTHER

## 2018-11-05 RX ORDER — POTASSIUM CHLORIDE 20 MEQ/1
20 TABLET, EXTENDED RELEASE ORAL EVERY 12 HOURS
Status: DISCONTINUED | OUTPATIENT
Start: 2018-11-06 | End: 2018-11-08 | Stop reason: HOSPADM

## 2018-11-05 RX ORDER — ASPIRIN 81 MG/1
81 TABLET ORAL DAILY
Status: DISCONTINUED | OUTPATIENT
Start: 2018-11-06 | End: 2018-11-08 | Stop reason: HOSPADM

## 2018-11-05 RX ORDER — CLOPIDOGREL BISULFATE 75 MG/1
75 TABLET ORAL DAILY
Status: DISCONTINUED | OUTPATIENT
Start: 2018-11-06 | End: 2018-11-06

## 2018-11-05 RX ORDER — FENTANYL CITRATE 50 UG/ML
INJECTION, SOLUTION INTRAMUSCULAR; INTRAVENOUS
Status: DISCONTINUED | OUTPATIENT
Start: 2018-11-05 | End: 2018-11-05

## 2018-11-05 RX ORDER — FENTANYL CITRATE 50 UG/ML
25 INJECTION, SOLUTION INTRAMUSCULAR; INTRAVENOUS
Status: DISCONTINUED | OUTPATIENT
Start: 2018-11-05 | End: 2018-11-06 | Stop reason: RX

## 2018-11-05 RX ORDER — SODIUM CHLORIDE, SODIUM LACTATE, POTASSIUM CHLORIDE, CALCIUM CHLORIDE 600; 310; 30; 20 MG/100ML; MG/100ML; MG/100ML; MG/100ML
500 INJECTION, SOLUTION INTRAVENOUS
Status: DISCONTINUED | OUTPATIENT
Start: 2018-11-05 | End: 2018-11-08 | Stop reason: HOSPADM

## 2018-11-05 RX ORDER — NICARDIPINE HYDROCHLORIDE 0.2 MG/ML
5 INJECTION INTRAVENOUS CONTINUOUS
Status: DISCONTINUED | OUTPATIENT
Start: 2018-11-05 | End: 2018-11-06 | Stop reason: RX

## 2018-11-05 RX ORDER — NOREPINEPHRINE BITARTRATE/D5W 4MG/250ML
0.02 PLASTIC BAG, INJECTION (ML) INTRAVENOUS CONTINUOUS
Status: DISCONTINUED | OUTPATIENT
Start: 2018-11-05 | End: 2018-11-05

## 2018-11-05 RX ORDER — POTASSIUM CHLORIDE 14.9 MG/ML
60 INJECTION INTRAVENOUS
Status: DISCONTINUED | OUTPATIENT
Start: 2018-11-05 | End: 2018-11-06 | Stop reason: HOSPADM

## 2018-11-05 RX ORDER — HEPARIN SODIUM 5000 [USP'U]/ML
INJECTION, SOLUTION INTRAVENOUS; SUBCUTANEOUS
Status: DISCONTINUED | OUTPATIENT
Start: 2018-11-05 | End: 2018-11-05

## 2018-11-05 RX ADMIN — OXYCODONE HYDROCHLORIDE 10 MG: 5 TABLET ORAL at 03:11

## 2018-11-05 RX ADMIN — FENTANYL CITRATE 50 MCG: 50 INJECTION, SOLUTION INTRAMUSCULAR; INTRAVENOUS at 12:11

## 2018-11-05 RX ADMIN — SODIUM CHLORIDE 0.5 MCG/KG/HR: 9 INJECTION INTRAMUSCULAR; INTRAVENOUS; SUBCUTANEOUS at 11:11

## 2018-11-05 RX ADMIN — CEFAZOLIN SODIUM 2 G: 2 SOLUTION INTRAVENOUS at 11:11

## 2018-11-05 RX ADMIN — DEXMEDETOMIDINE HYDROCHLORIDE 0.2 MCG/KG/HR: 4 INJECTION, SOLUTION INTRAVENOUS at 01:11

## 2018-11-05 RX ADMIN — FENTANYL CITRATE 100 MCG: 50 INJECTION, SOLUTION INTRAMUSCULAR; INTRAVENOUS at 07:11

## 2018-11-05 RX ADMIN — VANCOMYCIN HYDROCHLORIDE 2 G: 1 INJECTION, POWDER, LYOPHILIZED, FOR SOLUTION INTRAVENOUS at 07:11

## 2018-11-05 RX ADMIN — OXYCODONE HYDROCHLORIDE 5 MG: 5 TABLET ORAL at 07:11

## 2018-11-05 RX ADMIN — METOPROLOL TARTRATE 25 MG: 25 TABLET ORAL at 07:11

## 2018-11-05 RX ADMIN — FENTANYL CITRATE 50 MCG: 50 INJECTION, SOLUTION INTRAMUSCULAR; INTRAVENOUS at 11:11

## 2018-11-05 RX ADMIN — SODIUM CHLORIDE 2 UNITS: 9 INJECTION, SOLUTION INTRAVENOUS at 10:11

## 2018-11-05 RX ADMIN — AMINOCAPROIC ACID 5 G: 250 INJECTION, SOLUTION INTRAVENOUS at 07:11

## 2018-11-05 RX ADMIN — FENTANYL CITRATE 50 MCG: 50 INJECTION, SOLUTION INTRAMUSCULAR; INTRAVENOUS at 08:11

## 2018-11-05 RX ADMIN — AMINOCAPROIC ACID 5 G: 250 INJECTION, SOLUTION INTRAVENOUS at 12:11

## 2018-11-05 RX ADMIN — SODIUM CHLORIDE, SODIUM LACTATE, POTASSIUM CHLORIDE, AND CALCIUM CHLORIDE: 600; 310; 30; 20 INJECTION, SOLUTION INTRAVENOUS at 07:11

## 2018-11-05 RX ADMIN — FENTANYL CITRATE 25 MCG: 50 INJECTION INTRAMUSCULAR; INTRAVENOUS at 11:11

## 2018-11-05 RX ADMIN — LIDOCAINE HYDROCHLORIDE 100 MG: 10 INJECTION, SOLUTION INFILTRATION; PERINEURAL at 07:11

## 2018-11-05 RX ADMIN — PROPOFOL 70 MG: 10 INJECTION, EMULSION INTRAVENOUS at 07:11

## 2018-11-05 RX ADMIN — ROCURONIUM BROMIDE 30 MG: 10 INJECTION, SOLUTION INTRAVENOUS at 08:11

## 2018-11-05 RX ADMIN — FENTANYL CITRATE 50 MCG: 50 INJECTION, SOLUTION INTRAMUSCULAR; INTRAVENOUS at 09:11

## 2018-11-05 RX ADMIN — CEFAZOLIN SODIUM 2 G: 2 SOLUTION INTRAVENOUS at 03:11

## 2018-11-05 RX ADMIN — SODIUM CHLORIDE 1 UNITS/HR: 9 INJECTION, SOLUTION INTRAVENOUS at 03:11

## 2018-11-05 RX ADMIN — MIDAZOLAM 2 MG: 1 INJECTION INTRAMUSCULAR; INTRAVENOUS at 07:11

## 2018-11-05 RX ADMIN — ALBUMIN HUMAN 250 ML: 0.05 INJECTION, SOLUTION INTRAVENOUS at 11:11

## 2018-11-05 RX ADMIN — BUPIVACAINE 266 MG: 13.3 INJECTION, SUSPENSION, LIPOSOMAL INFILTRATION at 08:11

## 2018-11-05 RX ADMIN — VANCOMYCIN HYDROCHLORIDE 1500 MG: 1 INJECTION, POWDER, LYOPHILIZED, FOR SOLUTION INTRAVENOUS at 08:11

## 2018-11-05 RX ADMIN — SODIUM CHLORIDE 2 UNITS: 9 INJECTION, SOLUTION INTRAVENOUS at 09:11

## 2018-11-05 RX ADMIN — BUDESONIDE 0.5 MG: 0.5 SUSPENSION RESPIRATORY (INHALATION) at 08:11

## 2018-11-05 RX ADMIN — EPINEPHRINE 0.02 MCG/KG/MIN: 1 INJECTION INTRAMUSCULAR; INTRAVENOUS; SUBCUTANEOUS at 01:11

## 2018-11-05 RX ADMIN — PROTAMINE SULFATE 350 MG: 10 INJECTION, SOLUTION INTRAVENOUS at 11:11

## 2018-11-05 RX ADMIN — ALBUMIN HUMAN 250 ML: 0.05 INJECTION, SOLUTION INTRAVENOUS at 01:11

## 2018-11-05 RX ADMIN — CALCIUM CHLORIDE 1 G: 100 INJECTION, SOLUTION INTRAVENOUS; INTRAVENTRICULAR at 02:11

## 2018-11-05 RX ADMIN — ALBUMIN (HUMAN): 12.5 SOLUTION INTRAVENOUS at 11:11

## 2018-11-05 RX ADMIN — LIDOCAINE HYDROCHLORIDE 20 MG: 10 INJECTION, SOLUTION EPIDURAL; INFILTRATION; INTRACAUDAL; PERINEURAL at 12:11

## 2018-11-05 RX ADMIN — LEVOTHYROXINE SODIUM 137 MCG: 25 TABLET ORAL at 05:11

## 2018-11-05 RX ADMIN — IPRATROPIUM BROMIDE AND ALBUTEROL SULFATE 3 ML: .5; 3 SOLUTION RESPIRATORY (INHALATION) at 08:11

## 2018-11-05 RX ADMIN — HEPARIN SODIUM 45000 UNITS: 5000 INJECTION INTRAVENOUS; SUBCUTANEOUS at 09:11

## 2018-11-05 RX ADMIN — SODIUM CHLORIDE, SODIUM LACTATE, POTASSIUM CHLORIDE, AND CALCIUM CHLORIDE: 600; 310; 30; 20 INJECTION, SOLUTION INTRAVENOUS at 11:11

## 2018-11-05 RX ADMIN — IPRATROPIUM BROMIDE AND ALBUTEROL SULFATE 3 ML: .5; 3 SOLUTION RESPIRATORY (INHALATION) at 03:11

## 2018-11-05 RX ADMIN — DEXTROSE, SODIUM CHLORIDE, SODIUM LACTATE, POTASSIUM CHLORIDE, AND CALCIUM CHLORIDE: 5; .6; .31; .03; .02 INJECTION, SOLUTION INTRAVENOUS at 01:11

## 2018-11-05 RX ADMIN — ROCURONIUM BROMIDE 50 MG: 10 INJECTION, SOLUTION INTRAVENOUS at 09:11

## 2018-11-05 RX ADMIN — ROCURONIUM BROMIDE 20 MG: 10 INJECTION, SOLUTION INTRAVENOUS at 11:11

## 2018-11-05 RX ADMIN — CHLORHEXIDINE GLUCONATE 10 ML: 1.2 RINSE ORAL at 09:11

## 2018-11-05 RX ADMIN — ALBUMIN HUMAN 250 ML: 0.05 INJECTION, SOLUTION INTRAVENOUS at 02:11

## 2018-11-05 RX ADMIN — CEFAZOLIN 2 G: 1 INJECTION, POWDER, FOR SOLUTION INTRAMUSCULAR; INTRAVENOUS at 07:11

## 2018-11-05 RX ADMIN — ROCURONIUM BROMIDE 20 MG: 10 INJECTION, SOLUTION INTRAVENOUS at 09:11

## 2018-11-05 RX ADMIN — NITROGLYCERIN 20 MCG: 5 INJECTION, SOLUTION INTRAVENOUS at 11:11

## 2018-11-05 RX ADMIN — ARFORMOTEROL TARTRATE 15 MCG: 15 SOLUTION RESPIRATORY (INHALATION) at 08:11

## 2018-11-05 RX ADMIN — FENTANYL CITRATE 25 MCG: 50 INJECTION INTRAMUSCULAR; INTRAVENOUS at 03:11

## 2018-11-05 RX ADMIN — ROCURONIUM BROMIDE 100 MG: 10 INJECTION, SOLUTION INTRAVENOUS at 07:11

## 2018-11-05 RX ADMIN — SUGAMMADEX 200 MG: 100 INJECTION, SOLUTION INTRAVENOUS at 01:11

## 2018-11-05 RX ADMIN — VASOPRESSIN 0.04 UNITS/MIN: 20 INJECTION INTRAVENOUS at 02:11

## 2018-11-05 RX ADMIN — SODIUM CHLORIDE, SODIUM LACTATE, POTASSIUM CHLORIDE, AND CALCIUM CHLORIDE 500 ML: .6; .31; .03; .02 INJECTION, SOLUTION INTRAVENOUS at 05:11

## 2018-11-05 RX ADMIN — SODIUM CHLORIDE 3 UNITS: 9 INJECTION, SOLUTION INTRAVENOUS at 10:11

## 2018-11-05 RX ADMIN — FENTANYL CITRATE 50 MCG: 50 INJECTION INTRAMUSCULAR; INTRAVENOUS at 01:11

## 2018-11-05 NOTE — ASSESSMENT & PLAN NOTE
R/LHC completed yesterday which revealed significant LM involving LCX, ostial and LAD-ostial, normal LV function, Mild AS with CVT consult for CABG  CVT consult for CABG on Monday, 11/5.   Continue ASA, Statin, BB, ARB  Denies chest pain or shortness of breath on exam.    11/5/18  -s/p CABG x 2 today  -Remains stable  -Resume ASA, statin, BB, ARB, Plavix when able

## 2018-11-05 NOTE — OP NOTE
[facility] -   Cardiothoracic Surgery  Operative Note    SUMMARY     Date of Procedure: 11/5/2018     Procedure: Procedure(s) (LRB):  CORONARY ARTERY BYPASS GRAFT (CABG) (N/A)  SURGICAL PROCUREMENT, VEIN, ENDOSCOPIC (Left)  ECHOCARDIOGRAM,TRANSESOPHAGEAL (N/A)     Coronary bypass grafting x2 with LIMA to LAD and reverse saphenous vein graft to OM 2  Transverse aortotomy with excision of protuberant atheroslerotic plaques in the ascending aorta    Surgeon(s) and Role:     * Bear De Luna MD - Primary     * Terry Duque NP - Assisting        Pre-Operative Diagnosis: Left main coronary artery disease [I25.10]    Post-Operative Diagnosis: Post-Op Diagnosis Codes:     * Left main coronary artery disease [I25.10]    Anesthesia: General    Technical Procedures Used:  The patient was prepped and draped sterilely.  A median sternotomy incision was made which was carried down sharply to the sternum was encountered.  The sternum was then opened with a sternal saw.  The left half of the sternum was elevated with a mammary retractor. The left internal mammary artery was then dissected down as a pedicle.  While this was being done a segment of this team was harvesting the greater saphenous vein from patient's left thigh using an endovascular vein harvesting technique. Pericardium was then opened. The patient was then given heparin.  Pericardial sutures were placed. An epiaortic ultrasound was then performed which showed that the patient had multiple grade 4 plaques in the ascending aorta mostly posteriorly.  The aortic cannula pursestring was then placed in an area that was free of atherosclerotic plaques.  The venous pursestring was placed in the right atrial appendage.  The retrograde cardioplegia pursestring was placed in the free wall of the right atrium.  And the antegrade cardioplegia cannula was placed in the mid ascending aorta.  The patient was then cannulated with a 21 Divehi aortic cannula.  And a  3 way venous cannula was placed in the IVC.  The retrograde cardioplegic cannula was then placed in this coronary sinus.  And the antegrade cardioplegia cannula was placed in the mid ascending aorta.  The patient was then started on cardiopulmonary bypass.  The aorta was then crossclamped.  The heart was then arrested with antegrade warm cardioplegia.  Throughout the procedure the patient's heart was perfused with continuous retrograde cold blood intermittently supplemented with antegrade cold blood cardioplegia.  Attention was 1st drawn to the OM 2 which was identified. The aorta was opened.  The reverse saphenous vein graft was then anastomosed to the OM 2 as an end-to-side anastomosis using 7 0 continuous Prolene sutures.  Next attention was drawn to the aorta with 4.4 punch aortotomy was made. Inspection of the aorta showed several prominent friable atherosclerotic plaques just medial to the site of the punch aortotomy.  The decision was then made to excise the atherosclerotic plaques.  The punch aortotomy was then extended to a transverse aortotomy about 2/3 circumference of the aorta.  The atherosclerotic plaques were then removed using a rongeur.  The lumen of the aorta was then inspected and found to be free of any debris.  The aortotomy was then closed with 4 continuous Prolene sutures in 2 layers. A separate site was then chosen for the punch aortotomy.  A 4.4 punch aortotomy was then made.  The proximal anastomosis of the vein graft was then performed to the aorta using 6 0 continuous Prolene sutures.  Next attention was was drawn to the LAD.  Which was opened the LIMA was then anastomosed to the LAD using 8 0 continuous Prolene sutures as an end-to-side anastomosis. By this time the patient had been rewarming.  Atrial and ventricular pacemaker wires were placed.  A left pleural cath catheter and 2 mediastinal chest tubes were placed. The ventilation was restarted.  The patient was then weaned from  cardiopulmonary bypass without any problems.  Surgical sites were inspected and found to be free of bleeding.  The patient was then decannulated.  Heparin was then reversed with protamine.  Surgical sites were again inspected and found to be free of bleeding. The patient is sternum was then closed with figure-of-eight sternal wires.  The fascia was closed with 1.  Vicryl sutures.  And the skin was closed with 3 0 Monocryl sutures.  The patient tolerated procedure well.  Description of the Findings of the Procedure:     Significant Surgical Tasks Conducted by the Assistant(s), if Applicable:  Endoscopic vein harvesting  Complications: No    Estimated Blood Loss (EBL): *500 ml         Implants: * No implants in log *    Specimens:   Specimen (12h ago, onward)    Start     Ordered    11/05/18 1241  Specimen to Pathology - Surgery  Once     Comments:  Athrosclerotic plaqueDx:  CAD     Start Status   11/05/18 1241 Collected (11/05/18 1244)       11/05/18 1243    11/05/18 1155  Specimen to Pathology - Surgery  Once     Start Status   11/05/18 1155 Needs to be Collected       11/05/18 1203                  Condition: Stable    Disposition: ICU - intubated and hemodynamically stable.    Attestation: I performed the procedure.

## 2018-11-05 NOTE — HOSPITAL COURSE
11/5 - [facility]  11/6 - Tolerated extubation yesterday evening well.  Up to chair today and chest tubes removed.  Looks good

## 2018-11-05 NOTE — PLAN OF CARE
Problem: Patient Care Overview  Goal: Plan of Care Review  Outcome: Ongoing (interventions implemented as appropriate)  Received patient from surgery on vent. Started patient on epi gtt and vasopressin since admit. Patient extubated after 1 hour in ICU. Patient doing well, c/o left posterior shoulder pain. Family visited, update given, POC discussed.

## 2018-11-05 NOTE — SUBJECTIVE & OBJECTIVE
Past Medical History:   Diagnosis Date    Aortic stenosis     Asthma     BPH (benign prostatic hyperplasia)     CAD (coronary artery disease)     40% lesion 2002;[facility]azo    Cirrhosis     Claudication 4/9/2014    Colon polyp     ED (erectile dysfunction)     Encounter for blood transfusion     Ex-smoker     Hearing loss NEC     Hepatitis C     Cured;yuly brownoni 2015    Hyperlipidemia     Hypertension     Hypothyroid     s/p tx graves    DELONTE on CPAP     Osteoarthritis     PVD (peripheral vascular disease)     Type 2 diabetes mellitus        Past Surgical History:   Procedure Laterality Date    CARDIAC CATHETERIZATION      CARPAL TUNNEL RELEASE Left     CATARACT EXTRACTION W/ INTRAOCULAR LENS  IMPLANT, BILATERAL  2008    COLONOSCOPY  8/2013    COLONOSCOPY N/A 5/30/2016    Procedure: COLONOSCOPY;  Surgeon: Anna Tomas MD;  [facility];  Service: Endoscopy;  Laterality: N/A;    COLONOSCOPY N/A 5/30/2016    Performed by Anna Tomas MD at [facility]    COLONOSCOPY N/A 8/23/2013    Performed by Nikhil Watson MD at [facility] END[facility]    ELBOW BURSA SURGERY Right 2010    ESOPHAGOGASTRODUODENOSCOPY (EGD) N/A 6/26/2017    Performed by Anna Tomas MD at [facility] END[facility]    ESOPHAGOGASTRODUODENOSCOPY (EGD) N/A 5/30/2016    Performed by Anna Tomas MD at [facility] END[facility]    EYE SURGERY      KNEE SURGERY Right     RECTAL SURGERY  2012    TRANSURETHRAL RESECTION OF PROSTATE (TURP) WITHOUT USE OF LASER N/A 6/19/2018    Procedure: TURP, WITHOUT USING LASER;  Surgeon: Cooper Cordova IV, MD;  Location: [facility] OR;  Service: Urology;  Laterality: N/A;    TRIGGER FINGER RELEASE Left     TURP, WITHOUT USING LASER N/A 6/19/2018    Performed by Cooper Cordova IV, MD at [facility] OR       Review of patient's allergies indicates:   Allergen Reactions    Iodinated contrast- oral and iv dye Hives     Tachycardia    Omeprazole Hives and Itching    Prilosec [omeprazole magnesium] Hives and Itching       Family  History     Problem Relation (Age of Onset)    Heart disease Mother, Father, Brother        Tobacco Use    Smoking status: Former Smoker     Packs/day: 0.50     Years: 4.00     Pack years: 2.00     Last attempt to quit: 1972     Years since quittin.4    Smokeless tobacco: Former User     Types: Chew     Quit date: 1976   Substance and Sexual Activity    Alcohol use: Yes     Alcohol/week: 1.2 oz     Types: 2 Cans of beer per week     Comment: daily  No alcohol prior to surgery    Drug use: No    Sexual activity: Yes     Partners: Female         Review of Systems   Unable to perform ROS: Intubated     Objective:     Vital Signs (Most Recent):  Temp: 98.5 °F (36.9 °C) (18 1500)  Pulse: 91 (18 1500)  Resp: 16 (18 1500)  BP: 109/69 (18 1500)  SpO2: 97 % (18 1500) Vital Signs (24h Range):  Temp:  [96.2 °F (35.7 °C)-99 °F (37.2 °C)] 98.5 °F (36.9 °C)  Pulse:  [] 91  Resp:  [16-24] 16  SpO2:  [93 %-100 %] 97 %  BP: ()/(60-84) 109/69  Arterial Line BP: ()/(54-74) 123/72     Weight: 113.5 kg (250 lb 3.6 oz)  Body mass index is 38.05 kg/m².      Intake/Output Summary (Last 24 hours) at 2018 1507  Last data filed at 2018 1500  Gross per 24 hour   Intake 4320 ml   Output 3150 ml   Net 1170 ml       Physical Exam   Constitutional: He appears well-developed and well-nourished. He is cooperative.  Non-toxic appearance. He does not have a sickly appearance. He appears ill. No distress. He is intubated and restrained.   HENT:   Head: Normocephalic and atraumatic.   Mouth/Throat: Oropharynx is clear and moist and mucous membranes are normal.   Eyes: EOM and lids are normal. Pupils are equal, round, and reactive to light.   Neck: Trachea normal. Neck supple. Carotid bruit is not present.       Cardiovascular: Normal rate, regular rhythm and normal pulses.   Murmur heard.   Systolic murmur is present.  Pulses:       Radial pulses are 2+ on the right side, and  2+ on the left side.        Dorsalis pedis pulses are 2+ on the right side, and 2+ on the left side.   Pulmonary/Chest: Effort normal and breath sounds normal. No accessory muscle usage. He is intubated. No respiratory distress. He exhibits tenderness.       Abdominal: Soft. Normal appearance. He exhibits no distension. Bowel sounds are absent. There is no tenderness.   Genitourinary: Penis normal.   Genitourinary Comments: Armenta in place   Musculoskeletal: Normal range of motion.        Right foot: There is no deformity.        Left foot: There is no deformity.   No edema   Lymphadenopathy:     He has no cervical adenopathy.   Neurological: He is alert.   Awake with verbal stimuli and nods head to questions   Skin: Skin is warm and dry. Capillary refill takes less than 2 seconds. No rash noted. No cyanosis.            Vents:  Vent Mode: (S) Spont (11/05/18 1432)  Ventilator Initiated: Yes (11/05/18 1334)  Set Rate: 0 bmp (11/05/18 1432)  Vt Set: 410 mL (11/05/18 1432)  Pressure Support: (S) 10 cmH20 (11/05/18 1432)  PEEP/CPAP: 5 cmH20 (11/05/18 1432)  Oxygen Concentration (%): 28 (11/05/18 1452)  Peak Airway Pressure: 16 cmH2O (11/05/18 1432)  Plateau Pressure: 0 cmH20 (11/05/18 1432)  Total Ve: 12 mL (11/05/18 1432)  Negative Inspiratory Force (cm H2O): -43 (11/05/18 1452)  F/VT Ratio<105 (RSBI): (!) 30.61 (11/05/18 1432)    Lines/Drains/Airways     Central Venous Catheter Line                 Introducer 11/05/18 1200 right internal jugular less than 1 day         Pulmonary Artery Catheter Assessment  11/05/18 1208 less than 1 day          Drain                 Chest Tube 11/05/18 1127 3 Left Pleural 24 Fr. less than 1 day         Closed/Suction Drain 11/05/18 1300 Left Leg Bulb less than 1 day         Closed/Suction Drain 11/05/18 1302 Anterior Chest Other (Comment) less than 1 day         Urethral Catheter 11/05/18 0730 Latex;Straight-tip;Temperature probe 16 Fr. less than 1 day         Y Chest Tube 1 and 2  11/05/18 1120 1 Right Mediastinal 24 Fr. 2 Left Mediastinal 24 Fr. less than 1 day          Arterial Line                 Arterial Line 11/05/18 0725 Right Radial less than 1 day          Peripheral Intravenous Line                 Peripheral IV - Single Lumen 11/02/18 2139 Posterior;Right Forearm 2 days                Significant Labs:    CBC/Anemia Profile:  Recent Labs   Lab 11/05/18  0434 11/05/18  1204 11/05/18  1332 11/05/18  1334   WBC 6.52 10.69 10.38  --    HGB 13.5* 10.0* 10.3*  --    HCT 38.6* 28.4* 29.1* 26*   * 82* 106*  --    MCV 96 96 95  --    RDW 13.1 13.0 13.2  --         Chemistries:  Recent Labs   Lab 11/04/18  0228 11/04/18  1016 11/05/18  1332    139 140  140   K 3.8 3.6 4.9  4.9    103 109  109   CO2 24 24 21*  21*   BUN 13 12 11  11   CREATININE 1.0 1.0 0.9  0.9   CALCIUM 8.7 9.0 7.8*  7.8*   ALBUMIN 3.4* 3.5  --    PROT 6.9 7.3  --    BILITOT 0.5 0.7  --    ALKPHOS 63 68  --    ALT 29 30  --    AST 27 30  --    MG  --   --  3.4*       ABGs:   Recent Labs   Lab 11/05/18  1334   PH 7.405   PCO2 41.4   HCO3 26.0   POCSATURATED 100   BE 1     POCT Glucose:   Recent Labs   Lab 11/05/18  0533 11/05/18  1338 11/05/18  1501   POCTGLUCOSE 135* 150* 222*     All pertinent labs within the past 24 hours have been reviewed.    Significant Imaging:   CXR: I have reviewed all pertinent results/findings within the past 24 hours and my personal findings are:  no acute pulm process noted

## 2018-11-05 NOTE — PROGRESS NOTES
[facility] -   Cardiology  Progress Note    Patient Name: Erendira Pretty  MRN: [id number]  Admission Date: 11/2/2018  Hospital Length of Stay: 0 days  Code Status: Full Code   Attending Physician: Bear De Luna MD   Primary Care Physician: Cortes Roblero MD  Expected Discharge Date:   Principal Problem:<principal problem not specified>    Subjective:   HPI:  Mr. Pretty is a 73 year old male who presented to [facility] due to LHC per Dr. Gallardo. R/LHC revealed significant LM involving LCX, ostial and LAD-ostial, normal LV function, Mild AS and CVT consulted. CVT recommended CABG on Monday, 11/5. Patient placed in outpatient recovery awaiting CABG for Monday.     Hospital Course:   11/3/18-Patient seen and examined in room, lying in bed. Denies chest pain or shortness of breath today.     11/4/18-Patient seen and examined in room, lying in bed. Denies chest pan or shortness of breath today. Labs reviewed, K+ 3.6, Cr 1.0. Pending CABG in AM.     11/5/18-Patient seen and examined, s/p CABG x 2 this AM. Remains stable CV wise.         Review of Systems   Unable to perform ROS: intubated     Objective:     Vital Signs (Most Recent):  Temp: 97.7 °F (36.5 °C) (11/05/18 1400)  Pulse: 91 (11/05/18 1445)  Resp: 20 (11/05/18 1445)  BP: 117/70 (11/05/18 1430)  SpO2: 99 % (11/05/18 1445) Vital Signs (24h Range):  Temp:  [96.2 °F (35.7 °C)-99 °F (37.2 °C)] 97.7 °F (36.5 °C)  Pulse:  [] 91  Resp:  [16-24] 20  SpO2:  [93 %-100 %] 99 %  BP: ()/(60-84) 117/70  Arterial Line BP: ()/(54-74) 114/66     Weight: 113.5 kg (250 lb 3.6 oz)  Body mass index is 38.05 kg/m².     SpO2: 99 %  O2 Device (Oxygen Therapy): ventilator      Intake/Output Summary (Last 24 hours) at 11/5/2018 1455  Last data filed at 11/5/2018 1422  Gross per 24 hour   Intake 4320 ml   Output 3075 ml   Net 1245 ml       Lines/Drains/Airways     Central Venous Catheter Line                 Introducer 11/05/18 1200 right internal jugular less  than 1 day         Pulmonary Artery Catheter Assessment  11/05/18 1208 less than 1 day          Drain                 Chest Tube 11/05/18 1127 3 Left Pleural 24 Fr. less than 1 day         Closed/Suction Drain 11/05/18 1300 Left Leg Bulb less than 1 day         Closed/Suction Drain 11/05/18 1302 Anterior Chest Other (Comment) less than 1 day         Urethral Catheter 11/05/18 0730 Latex;Straight-tip;Temperature probe 16 Fr. less than 1 day         Y Chest Tube 1 and 2 11/05/18 1120 1 Right Mediastinal 24 Fr. 2 Left Mediastinal 24 Fr. less than 1 day          Arterial Line                 Arterial Line 11/05/18 0725 Right Radial less than 1 day          Peripheral Intravenous Line                 Peripheral IV - Single Lumen 11/02/18 2139 Posterior;Right Forearm 2 days                Physical Exam   Constitutional: He appears well-nourished. No distress.   Intubated   HENT:   Head: Normocephalic and atraumatic.   Eyes: Pupils are equal, round, and reactive to light. Right eye exhibits no discharge. Left eye exhibits no discharge.   Neck: Neck supple. No JVD present. No thyromegaly present.   Cardiovascular: Normal rate, regular rhythm, S1 normal, S2 normal and normal heart sounds.   No murmur heard.  Pulmonary/Chest: Effort normal.   CABG site dressed; C/D/I; no bleeding or erythema    Diminished BS anteriorly   Abdominal: Soft. He exhibits no distension. There is no tenderness. There is no rebound.   Musculoskeletal: He exhibits no edema.   Neurological:   Awake and alert   Skin: Skin is warm and dry. He is not diaphoretic. No erythema.   Nursing note and vitals reviewed.      Significant Labs:   CMP   Recent Labs   Lab 11/04/18  0228 11/04/18  1016 11/05/18  1332    139 140  140   K 3.8 3.6 4.9  4.9    103 109  109   CO2 24 24 21*  21*   * 182* 159*  159*   BUN 13 12 11  11   CREATININE 1.0 1.0 0.9  0.9   CALCIUM 8.7 9.0 7.8*  7.8*   PROT 6.9 7.3  --    ALBUMIN 3.4* 3.5  --    BILITOT 0.5  0.7  --    ALKPHOS 63 68  --    AST 27 30  --    ALT 29 30  --    ANIONGAP 12 12 10  10   ESTGFRAFRICA >60 >60 >60  >60   EGFRNONAA >60 >60 >60  >60   , CBC   Recent Labs   Lab 11/05/18  0434 11/05/18  1204 11/05/18  1332 11/05/18  1334   WBC 6.52 10.69 10.38  --    HGB 13.5* 10.0* 10.3*  --    HCT 38.6* 28.4* 29.1* 26*   * 82* 106*  --    , Troponin No results for input(s): TROPONINI in the last 48 hours. and All pertinent lab results from the last 24 hours have been reviewed.    Significant Imaging: Echocardiogram:   2D echo with color flow doppler:   Results for orders placed or performed in visit on 10/25/18   2D echo with color flow doppler   Result Value Ref Range    Calculated EF 55 55 - 65    Diastolic Dysfunction No     Aortic Valve Stenosis MILD TO MODERATE (A)     Est. PA Systolic Pressure 27.04     Mitral Valve Mobility NORMAL     Tricuspid Valve Regurgitation MILD    , EKG: Revieweed and X-Ray: CXR: X-Ray Chest 1 View (CXR): No results found for this visit on 11/02/18. and X-Ray Chest PA and Lateral (CXR):   Results for orders placed or performed during the hospital encounter of 11/02/18   X-Ray Chest PA And Lateral    Narrative    EXAMINATION:  XR CHEST PA AND LATERAL    CLINICAL HISTORY:  Pre Op;    TECHNIQUE:  Single frontal view of the chest was performed.    COMPARISON:  None    FINDINGS:  The lungs are clear, with normal appearance of pulmonary vasculature and no pleural effusion or pneumothorax.    The cardiac silhouette is normal in size. The hilar and mediastinal contours are unremarkable.    Bones are intact.      Impression    No acute abnormality.      Electronically signed by: Vincent Gonzalez Jr., MD  Date:    11/02/2018  Time:    22:59     Assessment and Plan:   Patient who presents with UA/critical LM disease, s/p CABG x 2. Remains stable CV wise. Continue current post op care.    Mixed hyperlipidemia    -Continue statin and niacin     Left main coronary artery disease    R/LHC  completed yesterday which revealed significant LM involving LCX, ostial and LAD-ostial, normal LV function, Mild AS with CVT consult for CABG  CVT consult for CABG on Monday, 11/5.   Continue ASA, Statin, BB, ARB  Denies chest pain or shortness of breath on exam.    11/5/18  -s/p CABG x 2 today  -Remains stable  -Resume ASA, statin, BB, ARB, Plavix when able     Aortic stenosis    Mild AS per [facility] yesterday     Obesity, unspecified    Encourage weight loss     Essential hypertension    -On medical therapy  -Resume BB and ARB once able         VTE Risk Mitigation (From admission, onward)        Ordered     IP VTE LOW RISK PATIENT  Once      11/02/18 1800     Place DHEERAJ hose  Until discontinued      11/02/18 1800     Place sequential compression device  Until discontinued      11/02/18 1800          Aster Petty PA-C  Cardiology  [facility]    Chart reviewed. Dr. Wang examined patient and agrees with plan as outlined above.

## 2018-11-05 NOTE — PROGRESS NOTES
Patient sleepy and spontaneous VT decreases without verbal encouragement at bedside. Ventilator settings switch to REBECA, Red GRANADOS at bedside aware.

## 2018-11-05 NOTE — PLAN OF CARE
Escorted to preop via stretcher with cardiac monitor and accompanied by rn and wife. Pt ambulatory preop. nsr on monitor noted with pac's. aquacel applied to sacral area by rn with explanation given to pr and wife.

## 2018-11-05 NOTE — ANESTHESIA PREPROCEDURE EVALUATION
11/05/2018  Erendira Pretty is a 73 y.o., male.  Aortic stenosis       Asthma      BPH (benign prostatic hyperplasia)      CAD (coronary artery disease)       40% lesion 2002;lazo    Cirrhosis      Claudication 4/9/2014    Colon polyp      ED (erectile dysfunction)      Encounter for blood transfusion      Ex-smoker      Hearing loss NEC      Hepatitis C       Cured;reji brown 2015    Hyperlipidemia      Hypertension      Hypothyroid       s/p tx graves    DELONTE on CPAP      Osteoarthritis      PVD (peripheral vascular disease)      Type 2 diabetes mellitus                Anesthesia Evaluation    I have reviewed the Patient Summary Reports.    I have reviewed the Nursing Notes.      Review of Systems  Anesthesia Hx:  Denies Family Hx of Anesthesia complications.   Denies Personal Hx of Anesthesia complications.   Social:  Former Smoker    Cardiovascular:   Hypertension Valvular problems/Murmurs (Mild -mod AS on echo RADHA 1 cmsq, MG 19 mmhg), AS CAD   Angina PVD MARCUS NYHA Classification II ECG has been reviewed.    Pulmonary:   Shortness of breath Sleep Apnea, CPAP    Hepatic/GI:   Hepatitis (cured by harvoni in 2015), C    Musculoskeletal:   Arthritis     Neurological:  Neurology Normal    Endocrine:   Diabetes Hypothyroidism        Physical Exam  General:  Obesity    Airway/Jaw/Neck:  Airway Findings: Mouth Opening: Normal General Airway Assessment: Adult  Mallampati: III  Improves to II with phonation.  TM Distance: 4 - 6 cm       Chest/Lungs:  Chest/Lungs Findings: Clear to auscultation, Normal Respiratory Rate     Heart/Vascular:  Heart Findings: Rate: Normal  Rhythm: Regular Rhythm        Mental Status:  Mental Status Findings:  Cooperative, Alert and Oriented      CONCLUSIONS     1 - Normal left ventricular systolic function (EF 55-60%).     2 - Normal left ventricular diastolic  function.     3 - Normal right ventricular systolic function .     4 - No wall motion abnormalities.     5 - Concentric hypertrophy.     6 - The estimated PA systolic pressure is greater than 27 mmHg.     7 - Mild tricuspid regurgitation.     8 - Mild pulmonic regurgitation.     9 - Mild to moderate aortic stenosis, RADHA = 1.0 cm2, AVAi = 0.49 cm2/m2, peak velocity = 2.95 m/s, mean gradient = 19 mmHg.     10 - Enlarged ascending aorta.    Anesthesia Plan  Type of Anesthesia, risks & benefits discussed:  Anesthesia Type:  general  Patient's Preference:   Intra-op Monitoring Plan: arterial line, Burson-Jerome, standard ASA monitors, cardiac output and central line  Intra-op Monitoring Plan Comments:   Post Op Pain Control Plan: per primary service following discharge from PACU  Post Op Pain Control Plan Comments:   Induction:   IV  Beta Blocker:         Informed Consent: Patient understands risks and agrees with Anesthesia plan.  Questions answered. Anesthesia consent signed with patient.  ASA Score: 4     Day of Surgery Review of History & Physical:  There are no significant changes.          Ready For Surgery From Anesthesia Perspective.

## 2018-11-05 NOTE — HPI
Mr Pretty is a 74 yo WM with a PMH of CAD, Hep C Cirrhosis, HLD, HTN, mild AS, BPH, DELONTE on CPAP, DM2 and OA.  He was seen in Cards clinic for follow up and complained of CP and SOB.  He had LHC done 11/2 revealing left main dz of LCx and LAD with normal LV function and mild AS.  CVT surgery consulted and today taken to OR undergoing 2-vessel CABG.

## 2018-11-05 NOTE — PLAN OF CARE
Problem: Patient Care Overview  Goal: Plan of Care Review  Outcome: Ongoing (interventions implemented as appropriate)  Pt is on room air; tolerates txs well; encouraged use of incentive spirometer before and after surgery.

## 2018-11-05 NOTE — ANESTHESIA PROCEDURE NOTES
[place] [facility]    Diagnosis: CAD  Patient location during procedure: done in OR  Staffing  Performed: anesthesiologist   Anesthesiologist was present at the time of the procedure.  Preanesthetic Checklist  Completed: patient identified, site marked, surgical consent, pre-op evaluation, timeout performed, IV checked, risks and benefits discussed, monitors and equipment checked and anesthesia consent given  [place] [facility]  Skin Prep: chlorhexidine gluconate  Local Infiltration: none  Location: right,  internal jugular vein  Introducer: 9 Fr single lumen,   Device: CCO/Oximetric Catheter  Catheter Size: 7.5 Fr PAC floated with balloon up not wedgedSterile sheath usedInsertion Attempts: 1  Indication: intravenous therapy, hemodynamic monitoring  Assessment  Central Line Bundle Protocol followed. Hand hygiene before procedure, surgical cap worn, surgical mask worn, sterile surgical gloves worn, large sterile drape used.  Verification: blood return  Dressing: sutured in place and taped and secured with tape  Patient: Tolerated Well

## 2018-11-05 NOTE — SUBJECTIVE & OBJECTIVE
Review of Systems   Unable to perform ROS: intubated     Objective:     Vital Signs (Most Recent):  Temp: 97.7 °F (36.5 °C) (11/05/18 1400)  Pulse: 91 (11/05/18 1445)  Resp: 20 (11/05/18 1445)  BP: 117/70 (11/05/18 1430)  SpO2: 99 % (11/05/18 1445) Vital Signs (24h Range):  Temp:  [96.2 °F (35.7 °C)-99 °F (37.2 °C)] 97.7 °F (36.5 °C)  Pulse:  [] 91  Resp:  [16-24] 20  SpO2:  [93 %-100 %] 99 %  BP: ()/(60-84) 117/70  Arterial Line BP: ()/(54-74) 114/66     Weight: 113.5 kg (250 lb 3.6 oz)  Body mass index is 38.05 kg/m².     SpO2: 99 %  O2 Device (Oxygen Therapy): ventilator      Intake/Output Summary (Last 24 hours) at 11/5/2018 1455  Last data filed at 11/5/2018 1422  Gross per 24 hour   Intake 4320 ml   Output 3075 ml   Net 1245 ml       Lines/Drains/Airways     Central Venous Catheter Line                 Introducer 11/05/18 1200 right internal jugular less than 1 day         Pulmonary Artery Catheter Assessment  11/05/18 1208 less than 1 day          Drain                 Chest Tube 11/05/18 1127 3 Left Pleural 24 Fr. less than 1 day         Closed/Suction Drain 11/05/18 1300 Left Leg Bulb less than 1 day         Closed/Suction Drain 11/05/18 1302 Anterior Chest Other (Comment) less than 1 day         Urethral Catheter 11/05/18 0730 Latex;Straight-tip;Temperature probe 16 Fr. less than 1 day         Y Chest Tube 1 and 2 11/05/18 1120 1 Right Mediastinal 24 Fr. 2 Left Mediastinal 24 Fr. less than 1 day          Arterial Line                 Arterial Line 11/05/18 0725 Right Radial less than 1 day          Peripheral Intravenous Line                 Peripheral IV - Single Lumen 11/02/18 2139 Posterior;Right Forearm 2 days                Physical Exam   Constitutional: He appears well-nourished. No distress.   Intubated   HENT:   Head: Normocephalic and atraumatic.   Eyes: Pupils are equal, round, and reactive to light. Right eye exhibits no discharge. Left eye exhibits no discharge.   Neck:  Neck supple. No JVD present. No thyromegaly present.   Cardiovascular: Normal rate, regular rhythm, S1 normal, S2 normal and normal heart sounds.   No murmur heard.  Pulmonary/Chest: Effort normal.   CABG site dressed; C/D/I; no bleeding or erythema    Diminished BS anteriorly   Abdominal: Soft. He exhibits no distension. There is no tenderness. There is no rebound.   Musculoskeletal: He exhibits no edema.   Neurological:   Awake and alert   Skin: Skin is warm and dry. He is not diaphoretic. No erythema.   Nursing note and vitals reviewed.      Significant Labs:   CMP   Recent Labs   Lab 11/04/18  0228 11/04/18  1016 11/05/18  1332    139 140  140   K 3.8 3.6 4.9  4.9    103 109  109   CO2 24 24 21*  21*   * 182* 159*  159*   BUN 13 12 11  11   CREATININE 1.0 1.0 0.9  0.9   CALCIUM 8.7 9.0 7.8*  7.8*   PROT 6.9 7.3  --    ALBUMIN 3.4* 3.5  --    BILITOT 0.5 0.7  --    ALKPHOS 63 68  --    AST 27 30  --    ALT 29 30  --    ANIONGAP 12 12 10  10   ESTGFRAFRICA >60 >60 >60  >60   EGFRNONAA >60 >60 >60  >60   , CBC   Recent Labs   Lab 11/05/18  0434 11/05/18  1204 11/05/18  1332 11/05/18  1334   WBC 6.52 10.69 10.38  --    HGB 13.5* 10.0* 10.3*  --    HCT 38.6* 28.4* 29.1* 26*   * 82* 106*  --    , Troponin No results for input(s): TROPONINI in the last 48 hours. and All pertinent lab results from the last 24 hours have been reviewed.    Significant Imaging: Echocardiogram:   2D echo with color flow doppler:   Results for orders placed or performed in visit on 10/25/18   2D echo with color flow doppler   Result Value Ref Range    Calculated EF 55 55 - 65    Diastolic Dysfunction No     Aortic Valve Stenosis MILD TO MODERATE (A)     Est. PA Systolic Pressure 27.04     Mitral Valve Mobility NORMAL     Tricuspid Valve Regurgitation MILD    , EKG: Revieweed and X-Ray: CXR: X-Ray Chest 1 View (CXR): No results found for this visit on 11/02/18. and X-Ray Chest PA and Lateral (CXR):    Results for orders placed or performed during the hospital encounter of 11/02/18   X-Ray Chest PA And Lateral    Narrative    EXAMINATION:  XR CHEST PA AND LATERAL    CLINICAL HISTORY:  Pre Op;    TECHNIQUE:  Single frontal view of the chest was performed.    COMPARISON:  None    FINDINGS:  The lungs are clear, with normal appearance of pulmonary vasculature and no pleural effusion or pneumothorax.    The cardiac silhouette is normal in size. The hilar and mediastinal contours are unremarkable.    Bones are intact.      Impression    No acute abnormality.      Electronically signed by: Vincent Gonzalez Jr., MD  Date:    11/02/2018  Time:    22:59

## 2018-11-05 NOTE — PLAN OF CARE
Problem: Patient Care Overview  Goal: Plan of Care Review  Outcome: Ongoing (interventions implemented as appropriate)  POC reviewed, verbalized understanding. Pt remained free from falls, fall precautions in place. Pt is SB-NSR on monitor, 50-70s. VSS. Pt kept NPO after midnight. Blood glucose monitored. Pt clipped and prepped for CABG procedure for tomorrow. All educational videos were viewed by patient. No other c/o at this time. PIV intact, saline locked. Call bell and personal belongings within reach. Hourly rounding complete. Reminded to call for assistance. Will continue to monitor.

## 2018-11-05 NOTE — CONSULTS
[facility]  Critical Care Medicine  Consult Note    Patient Name: Erendira Pretty  MRN: [id number]  Admission Date: 11/2/2018  Hospital Length of Stay: 0 days  Code Status: Full Code  Attending Physician: Bear De Luna MD   Primary Care Provider: Cortes Roblero MD   Principal Problem: Left main coronary artery disease      Subjective:     HPI:  Mr Pretty is a 74 yo WM with a PMH of CAD, Hep C Cirrhosis, HLD, HTN, mild AS, BPH, DELONTE on CPAP, DM2 and OA.  He was seen in Cards clinic for follow up and complained of CP and SOB.  He had LHC done 11/2 revealing left main dz of LCx and LAD with normal LV function and mild AS.  CVT surgery consulted and today taken to OR undergoing 2-vessel CABG.    Hospital/ICU Course:  11/5 - Admitted to ICU post op from OR on mech vent and low dose Epi infusion    Past Medical History:   Diagnosis Date    Aortic stenosis     Asthma     BPH (benign prostatic hyperplasia)     CAD (coronary artery disease)     40% lesion 2002;lazo    Cirrhosis     Claudication 4/9/2014    Colon polyp     ED (erectile dysfunction)     Encounter for blood transfusion     Ex-smoker     Hearing loss NEC     Hepatitis C     Cured;yuly brownoni 2015    Hyperlipidemia     Hypertension     Hypothyroid     s/p tx graves    DELONTE on CPAP     Osteoarthritis     PVD (peripheral vascular disease)     Type 2 diabetes mellitus        Past Surgical History:   Procedure Laterality Date    CARDIAC CATHETERIZATION      CARPAL TUNNEL RELEASE Left     CATARACT EXTRACTION W/ INTRAOCULAR LENS  IMPLANT, BILATERAL  2008    COLONOSCOPY  8/2013    COLONOSCOPY N/A 5/30/2016    Procedure: COLONOSCOPY;  Surgeon: Anna Tomas MD;  Location: [facility];  Service: Endoscopy;  Laterality: N/A;    COLONOSCOPY N/A 5/30/2016    Performed by [facility] MD at [facility] ENDO    COLONOSCOPY N/A 8/23/2013    Performed by Nikhil Watson MD at [facility]    ELBOW BURSA SURGERY Right 2010     ESOPHAGOGASTRODUODENOSCOPY (EGD) N/A 2017    Performed by Anna [facility]omas MD [facility] ENDO    ESOPHAGOGASTRODUODENOSCOPY (EGD) N/A 2016    Performed by Anna Tomas MD at [facility] ENDO    EYE SURGERY      KNEE SURGERY Right     RECTAL SURGERY      TRANSURETHRAL RESECTION OF PROSTATE (TURP) WITHOUT USE OF LASER N/A 2018    Procedure: TURP, WITHOUT USING LASER;  Surgeon: Cooper Cordova IV, MD;  Location: [facility] OR;  Service: Urology;  Laterality: N/A;    TRIGGER FINGER RELEASE Left     TURP, WITHOUT USING LASER N/A 2018    Performed by Cooper Cordova IV, MD at [facility] OR       Review of patient's allergies indicates:   Allergen Reactions    Iodinated contrast- oral and iv dye Hives     Tachycardia    Omeprazole Hives and Itching    Prilosec [omeprazole magnesium] Hives and Itching       Family History     Problem Relation (Age of Onset)    Heart disease Mother, Father, Brother        Tobacco Use    Smoking status: Former Smoker     Packs/day: 0.50     Years: 4.00     Pack years: 2.00     Last attempt to quit: 1972     Years since quittin.4    Smokeless tobacco: Former User     Types: Chew     Quit date: 1976   Substance and Sexual Activity    Alcohol use: Yes     Alcohol/week: 1.2 oz     Types: 2 Cans of beer per week     Comment: daily  No alcohol prior to surgery    Drug use: No    Sexual activity: Yes     Partners: Female         Review of Systems   Unable to perform ROS: Intubated     Objective:     Vital Signs (Most Recent):  Temp: 98.5 °F (36.9 °C) (18 1500)  Pulse: 91 (18 1500)  Resp: 16 (18 1500)  BP: 109/69 (18 1500)  SpO2: 97 % (18 1500) Vital Signs (24h Range):  Temp:  [96.2 °F (35.7 °C)-99 °F (37.2 °C)] 98.5 °F (36.9 °C)  Pulse:  [] 91  Resp:  [16-24] 16  SpO2:  [93 %-100 %] 97 %  BP: ()/(60-84) 109/69  Arterial Line BP: ()/(54-74) 123/72     Weight: 113.5 kg (250 lb 3.6 oz)  Body mass index is 38.05  kg/m².      Intake/Output Summary (Last 24 hours) at 11/5/2018 1507  Last data filed at 11/5/2018 1500  Gross per 24 hour   Intake 4320 ml   Output 3150 ml   Net 1170 ml       Physical Exam   Constitutional: He appears well-developed and well-nourished. He is cooperative.  Non-toxic appearance. He does not have a sickly appearance. He appears ill. No distress. He is intubated and restrained.   HENT:   Head: Normocephalic and atraumatic.   Mouth/Throat: Oropharynx is clear and moist and mucous membranes are normal.   Eyes: EOM and lids are normal. Pupils are equal, round, and reactive to light.   Neck: Trachea normal. Neck supple. Carotid bruit is not present.       Cardiovascular: Normal rate, regular rhythm and normal pulses.   Murmur heard.   Systolic murmur is present.  Pulses:       Radial pulses are 2+ on the right side, and 2+ on the left side.        Dorsalis pedis pulses are 2+ on the right side, and 2+ on the left side.   Pulmonary/Chest: Effort normal and breath sounds normal. No accessory muscle usage. He is intubated. No respiratory distress. He exhibits tenderness.       Abdominal: Soft. Normal appearance. He exhibits no distension. Bowel sounds are absent. There is no tenderness.   Genitourinary: Penis normal.   Genitourinary Comments: Armenta in place   Musculoskeletal: Normal range of motion.        Right foot: There is no deformity.        Left foot: There is no deformity.   No edema   Lymphadenopathy:     He has no cervical adenopathy.   Neurological: He is alert.   Awake with verbal stimuli and nods head to questions   Skin: Skin is warm and dry. Capillary refill takes less than 2 seconds. No rash noted. No cyanosis.            Vents:  Vent Mode: (S) Spont (11/05/18 1432)  Ventilator Initiated: Yes (11/05/18 1334)  Set Rate: 0 bmp (11/05/18 1432)  Vt Set: 410 mL (11/05/18 1432)  Pressure Support: (S) 10 cmH20 (11/05/18 1432)  PEEP/CPAP: 5 cmH20 (11/05/18 1432)  Oxygen Concentration (%): 28 (11/05/18  1452)  Peak Airway Pressure: 16 cmH2O (11/05/18 1432)  Plateau Pressure: 0 cmH20 (11/05/18 1432)  Total Ve: 12 mL (11/05/18 1432)  Negative Inspiratory Force (cm H2O): -43 (11/05/18 1452)  F/VT Ratio<105 (RSBI): (!) 30.61 (11/05/18 1432)    Lines/Drains/Airways     Central Venous Catheter Line                 Introducer 11/05/18 1200 right internal jugular less than 1 day         Pulmonary Artery Catheter Assessment  11/05/18 1208 less than 1 day          Drain                 Chest Tube 11/05/18 1127 3 Left Pleural 24 Fr. less than 1 day         Closed/Suction Drain 11/05/18 1300 Left Leg Bulb less than 1 day         Closed/Suction Drain 11/05/18 1302 Anterior Chest Other (Comment) less than 1 day         Urethral Catheter 11/05/18 0730 Latex;Straight-tip;Temperature probe 16 Fr. less than 1 day         Y Chest Tube 1 and 2 11/05/18 1120 1 Right Mediastinal 24 Fr. 2 Left Mediastinal 24 Fr. less than 1 day          Arterial Line                 Arterial Line 11/05/18 0725 Right Radial less than 1 day          Peripheral Intravenous Line                 Peripheral IV - Single Lumen 11/02/18 2139 Posterior;Right Forearm 2 days                Significant Labs:    CBC/Anemia Profile:  Recent Labs   Lab 11/05/18  0434 11/05/18  1204 11/05/18  1332 11/05/18  1334   WBC 6.52 10.69 10.38  --    HGB 13.5* 10.0* 10.3*  --    HCT 38.6* 28.4* 29.1* 26*   * 82* 106*  --    MCV 96 96 95  --    RDW 13.1 13.0 13.2  --         Chemistries:  Recent Labs   Lab 11/04/18  0228 11/04/18  1016 11/05/18  1332    139 140  140   K 3.8 3.6 4.9  4.9    103 109  109   CO2 24 24 21*  21*   BUN 13 12 11  11   CREATININE 1.0 1.0 0.9  0.9   CALCIUM 8.7 9.0 7.8*  7.8*   ALBUMIN 3.4* 3.5  --    PROT 6.9 7.3  --    BILITOT 0.5 0.7  --    ALKPHOS 63 68  --    ALT 29 30  --    AST 27 30  --    MG  --   --  3.4*       ABGs:   Recent Labs   Lab 11/05/18  1334   PH 7.405   PCO2 41.4   HCO3 26.0   POCSATURATED 100   BE 1     POCT  Glucose:   Recent Labs   Lab 11/05/18  0533 11/05/18  1338 11/05/18  1501   POCTGLUCOSE 135* 150* 222*     All pertinent labs within the past 24 hours have been reviewed.    Significant Imaging:   CXR: I have reviewed all pertinent results/findings within the past 24 hours and my personal findings are:  no acute pulm process noted    Assessment/Plan:     Pulmonary   On mechanically assisted ventilation    Vent wean per CVT protocol  SAT/SBT now and plan extubation soon     Cardiac/Vascular   * Left main coronary artery disease    S/P CABG X 2  Cards following  Cont lopressor, ASA, Plavix  ICU post op Cardiac monitoring     Aortic stenosis mild    IV lasix  Low dose Epi infusion  Cards following     S/P CABG x 2    Per CVT surgery   ICU hemodynamic monitoring  Chest tube output monitoring     Oncology   Acute blood loss anemia    Monitor chest tube output  Follow CBC  Transfuse as needed per surgery     Endocrine   Obesity, unspecified    Will encourage weight loss     Type 2 diabetes mellitus with post op hyperglycemia    Cont SSI and change to SSI tomorrow     Other   DELONTE on CPAP    May use home noct CPAP        Preventive Measures and Monitoring:   Stress Ulcer: Pepcid  Nutrition: NPO  Glucose control: insulin infusion  Bowel prophylaxis: Miralax  DVT prophylaxis: SCDs  Hx CAD on B-Blocker: Lopressor  Head of Bed/Reposition: Elevate HOB and turn Q1-2 hours   Early Mobility: bed rest today  SAT/SBT: now  RASS goal: 0  Vent Day: #1  OG Day: #1  Central Line Right PA Catheter Day: #1  Right Radial arterial line Day: #1  Chest tubes X 3 Day: #1  Armenta Day: #1  IVAB Day: #1  Code Status: Full  Pneumonia Vaccine: UTD  Flu Vaccine: UTD    Counseling/Consultation:I have discussed the care of this patient in detail with the bedside nursing staff and Dr. Molina    Critical Care Time: 48 minutes  Critical secondary to Patient has a condition that poses threat to life and bodily function: intubated on [facility]  ventilation  Patient is currently on drug therapy requiring intensive monitoring for toxicity: Epi infusion     Critical care was time spent personally by me on the following activities: development of treatment plan with patient or surrogate and bedside caregivers, discussions with consultants, evaluation of patient's response to treatment, examination of patient, ordering and performing treatments and interventions, ordering and review of laboratory studies, ordering and review of radiographic studies, pulse oximetry, re-evaluation of patient's condition. This critical care time did not overlap with that of any other provider or involve time for any procedures.    Thank you for your consult. I will follow-up with patient. Please contact us if you have any additional questions.     Alejandro Perez NP  Critical Care Medicine  [facility]

## 2018-11-05 NOTE — TRANSFER OF CARE
"Anesthesia Transfer of Care Note    Patient: Erendira Pretty    Procedure(s) Performed: Procedure(s) (LRB):  CORONARY ARTERY BYPASS GRAFT (CABG) (N/A)  SURGICAL PROCUREMENT, VEIN, ENDOSCOPIC (Left)  ECHOCARDIOGRAM,TRANSESOPHAGEAL (N/A)    Anesthesia PACU Handoff    Last vitals:   Visit Vitals  BP (!) 179/84   Pulse 80   Temp 37.2 °C (99 °F) (Skin)   Resp 20   Ht 5' 8" (1.727 m)   Wt 113.5 kg (250 lb 3.6 oz)   SpO2 97%   BMI 38.05 kg/m²     "

## 2018-11-05 NOTE — PROGRESS NOTES
Patient currently intubated and on ventilator. MV settings changed to PS 10 on spontaenous cpap mode per MD De Luna at bedside at this time.

## 2018-11-05 NOTE — ANESTHESIA PROCEDURE NOTES
Central Line    Diagnosis: CAD  Patient location during procedure: done in OR  Procedure start time: 11/5/2018 7:38 AM  Procedure end time: 11/5/2018 7:48 AM  Staffing  Performed: anesthesiologist   Anesthesiologist was present at the time of the procedure.  Preanesthetic Checklist  Completed: patient identified, site marked, surgical consent, pre-op evaluation, timeout performed, IV checked, risks and benefits discussed, monitors and equipment checked and anesthesia consent given  Indication  Indication: hemodynamic monitoring, vascular access, med administration     Anesthesia   general anesthesia    Central Line  Skin Prep: skin prepped with ChloraPrep, skin prep agent completely dried prior to procedure  maximum sterile barriers used during central venous catheter insertion  hand hygiene performed prior to central venous catheter insertion  Location: right internal jugular,   Catheter type: double lumen  Catheter Size: 9 Fr  Ultrasound: vascular probe with ultrasound  Vessel Caliber: large, patent  Needle advanced into vessel with real time Ultrasound guidance.  Guidewire confirmed in vessel.   Manometry: Venous cannualation confirmed by visual estimation of blood vessel pressure using manometry.  Insertion Attempts: 1   Securement:line sutured, chlorhexidine patch, sterile dressing applied and blood return through all ports     Post-Procedure  Adverse Events:none

## 2018-11-05 NOTE — ANESTHESIA PROCEDURE NOTES
BERNARD    Diagnosis: CAD  Patient location during procedure: OR  Procedure start time: 11/5/2018 7:45 AM  Procedure end time: 11/5/2018 12:08 PM  Exam type: Final  Staffing  Performed: anesthesiologist   Preanesthetic Checklist  Completed: patient identified, surgical consent, pre-op evaluation, timeout performed, risks and benefits discussed, monitors and equipment checked, anesthesia consent given, oxygen available, suction available, hand hygiene performed and patient being monitored  Setup & Induction  Patient preparation: bite block inserted  Probe Insertion: easy  Exam: complete  Exam         LVAD:no  Estimated Ejection Fraction: > 55% normal  Regional Wall Abnormalities: no RWMA            Right Heart  Right Ventricle: normal  Right Ventricle Function: normal    Intra Atrial Septum  PFO: no shunt by color flow doppler      no Atrial Septal Defect (Asd)    Right Ventricle  Size: normal, Free Wall Thickness: normal    Aortic Valve:  Stenosis: mild (PG 26, MG 14, Fused LCC/NCC , Plainemetry [place] 1.8cmsq)  Morphology: trileaflet and mild  AV Mean Gradient: 14 mmHg  AV Peak Gradient: 26 mmHg    Mitral Valve:  Morphology:normal  Jet Description: mild        Pulmonic Valve:  Morphology:normal  Regurgitation(color flow): mild    Great Vessels  Ascending Aorta Diameter: 2.8 cm   Ascending Aorta Atherosclerosis: 3=sessile dz (3-5mm)  IABP: no  Aorta    Descending aorta IABP: no    Effusions  Effusions: none    Summary  Findings discussed with surgeon.    Other Findings   Post Cabg: Paced at 90. LV/RV fx normal.

## 2018-11-05 NOTE — ANESTHESIA PROCEDURE NOTES
Arterial    Diagnosis: CAD    Patient location during procedure: done in OR  Procedure start time: 11/5/2018 7:25 AM  Procedure end time: 11/5/2018 7:24 AM  Staffing  Performed: anesthesiologist   Anesthesiologist was present at the time of the procedure.  Preanesthetic Checklist  Completed: patient identified, site marked, surgical consent, pre-op evaluation, timeout performed, IV checked, risks and benefits discussed, monitors and equipment checked and anesthesia consent givenArterial  Skin Prep: chlorhexidine gluconate  Local Infiltration: none  Orientation: right  Location: radial  Catheter Size: 20 GInsertion Attempts: 1  Assessment  Dressing: sutured in place and taped and tegaderm  Patient: Tolerated well

## 2018-11-06 DIAGNOSIS — J44.89 ASTHMA WITH COPD: Primary | ICD-10-CM

## 2018-11-06 PROBLEM — Z99.11 ON MECHANICALLY ASSISTED VENTILATION: Status: RESOLVED | Noted: 2018-11-05 | Resolved: 2018-11-06

## 2018-11-06 LAB
ANION GAP SERPL CALC-SCNC: 6 MMOL/L
ANION GAP SERPL CALC-SCNC: 8 MMOL/L
APTT BLDCRRT: 31.4 SEC
BASOPHILS # BLD AUTO: 0.01 K/UL
BASOPHILS # BLD AUTO: 0.01 K/UL
BASOPHILS NFR BLD: 0.1 %
BASOPHILS NFR BLD: 0.1 %
BUN SERPL-MCNC: 12 MG/DL
BUN SERPL-MCNC: 13 MG/DL
CALCIUM SERPL-MCNC: 7.9 MG/DL
CALCIUM SERPL-MCNC: 8.5 MG/DL
CHLORIDE SERPL-SCNC: 103 MMOL/L
CHLORIDE SERPL-SCNC: 111 MMOL/L
CO2 SERPL-SCNC: 22 MMOL/L
CO2 SERPL-SCNC: 26 MMOL/L
CREAT SERPL-MCNC: 0.8 MG/DL
CREAT SERPL-MCNC: 1 MG/DL
DIFFERENTIAL METHOD: ABNORMAL
DIFFERENTIAL METHOD: ABNORMAL
EOSINOPHIL # BLD AUTO: 0 K/UL
EOSINOPHIL # BLD AUTO: 0 K/UL
EOSINOPHIL NFR BLD: 0.1 %
EOSINOPHIL NFR BLD: 0.1 %
ERYTHROCYTE [DISTWIDTH] IN BLOOD BY AUTOMATED COUNT: 13.4 %
ERYTHROCYTE [DISTWIDTH] IN BLOOD BY AUTOMATED COUNT: 13.4 %
EST. GFR  (AFRICAN AMERICAN): >60 ML/MIN/1.73 M^2
EST. GFR  (AFRICAN AMERICAN): >60 ML/MIN/1.73 M^2
EST. GFR  (NON AFRICAN AMERICAN): >60 ML/MIN/1.73 M^2
EST. GFR  (NON AFRICAN AMERICAN): >60 ML/MIN/1.73 M^2
GLUCOSE SERPL-MCNC: 103 MG/DL
GLUCOSE SERPL-MCNC: 168 MG/DL
HCT VFR BLD AUTO: 24.1 %
HCT VFR BLD AUTO: 24.1 %
HGB BLD-MCNC: 8.6 G/DL
HGB BLD-MCNC: 8.6 G/DL
INR PPP: 1.2
LYMPHOCYTES # BLD AUTO: 1.3 K/UL
LYMPHOCYTES # BLD AUTO: 1.3 K/UL
LYMPHOCYTES NFR BLD: 14.1 %
LYMPHOCYTES NFR BLD: 14.1 %
MAGNESIUM SERPL-MCNC: 2 MG/DL
MCH RBC QN AUTO: 34.3 PG
MCH RBC QN AUTO: 34.3 PG
MCHC RBC AUTO-ENTMCNC: 35.7 G/DL
MCHC RBC AUTO-ENTMCNC: 35.7 G/DL
MCV RBC AUTO: 96 FL
MCV RBC AUTO: 96 FL
MONOCYTES # BLD AUTO: 0.9 K/UL
MONOCYTES # BLD AUTO: 0.9 K/UL
MONOCYTES NFR BLD: 10.1 %
MONOCYTES NFR BLD: 10.1 %
NEUTROPHILS # BLD AUTO: 6.9 K/UL
NEUTROPHILS # BLD AUTO: 6.9 K/UL
NEUTROPHILS NFR BLD: 75.6 %
NEUTROPHILS NFR BLD: 75.6 %
PLATELET # BLD AUTO: 80 K/UL
PLATELET # BLD AUTO: 80 K/UL
PMV BLD AUTO: 9.7 FL
PMV BLD AUTO: 9.7 FL
POCT GLUCOSE: 111 MG/DL (ref 70–110)
POCT GLUCOSE: 114 MG/DL (ref 70–110)
POCT GLUCOSE: 126 MG/DL (ref 70–110)
POCT GLUCOSE: 139 MG/DL (ref 70–110)
POCT GLUCOSE: 148 MG/DL (ref 70–110)
POCT GLUCOSE: 154 MG/DL (ref 70–110)
POCT GLUCOSE: 157 MG/DL (ref 70–110)
POCT GLUCOSE: 171 MG/DL (ref 70–110)
POCT GLUCOSE: 172 MG/DL (ref 70–110)
POCT GLUCOSE: 175 MG/DL (ref 70–110)
POCT GLUCOSE: 193 MG/DL (ref 70–110)
POCT GLUCOSE: 95 MG/DL (ref 70–110)
POCT GLUCOSE: 97 MG/DL (ref 70–110)
POTASSIUM SERPL-SCNC: 3.6 MMOL/L
POTASSIUM SERPL-SCNC: 4.2 MMOL/L
PROTHROMBIN TIME: 12.5 SEC
RBC # BLD AUTO: 2.51 M/UL
RBC # BLD AUTO: 2.51 M/UL
SODIUM SERPL-SCNC: 137 MMOL/L
SODIUM SERPL-SCNC: 139 MMOL/L
WBC # BLD AUTO: 9.12 K/UL
WBC # BLD AUTO: 9.12 K/UL

## 2018-11-06 PROCEDURE — G8987 SELF CARE CURRENT STATUS: HCPCS | Mod: CM

## 2018-11-06 PROCEDURE — 97802 MEDICAL NUTRITION INDIV IN: CPT

## 2018-11-06 PROCEDURE — 85730 THROMBOPLASTIN TIME PARTIAL: CPT

## 2018-11-06 PROCEDURE — 85610 PROTHROMBIN TIME: CPT

## 2018-11-06 PROCEDURE — 97530 THERAPEUTIC ACTIVITIES: CPT

## 2018-11-06 PROCEDURE — 25000003 PHARM REV CODE 250: Performed by: THORACIC SURGERY (CARDIOTHORACIC VASCULAR SURGERY)

## 2018-11-06 PROCEDURE — 97167 OT EVAL HIGH COMPLEX 60 MIN: CPT

## 2018-11-06 PROCEDURE — G8979 MOBILITY GOAL STATUS: HCPCS | Mod: CI

## 2018-11-06 PROCEDURE — 99291 CRITICAL CARE FIRST HOUR: CPT | Mod: ,,, | Performed by: INTERNAL MEDICINE

## 2018-11-06 PROCEDURE — 25000242 PHARM REV CODE 250 ALT 637 W/ HCPCS: Performed by: THORACIC SURGERY (CARDIOTHORACIC VASCULAR SURGERY)

## 2018-11-06 PROCEDURE — 99233 SBSQ HOSP IP/OBS HIGH 50: CPT | Mod: 25,,, | Performed by: NURSE PRACTITIONER

## 2018-11-06 PROCEDURE — 05H933Z INSERTION OF INFUSION DEVICE INTO RIGHT BRACHIAL VEIN, PERCUTANEOUS APPROACH: ICD-10-PCS | Performed by: INTERNAL MEDICINE

## 2018-11-06 PROCEDURE — 94799 UNLISTED PULMONARY SVC/PX: CPT

## 2018-11-06 PROCEDURE — G8978 MOBILITY CURRENT STATUS: HCPCS | Mod: CK

## 2018-11-06 PROCEDURE — 21400001 HC TELEMETRY ROOM

## 2018-11-06 PROCEDURE — 36569 INSJ PICC 5 YR+ W/O IMAGING: CPT

## 2018-11-06 PROCEDURE — G8988 SELF CARE GOAL STATUS: HCPCS | Mod: CK

## 2018-11-06 PROCEDURE — 27000221 HC OXYGEN, UP TO 24 HOURS

## 2018-11-06 PROCEDURE — 83735 ASSAY OF MAGNESIUM: CPT

## 2018-11-06 PROCEDURE — C1751 CATH, INF, PER/CENT/MIDLINE: HCPCS

## 2018-11-06 PROCEDURE — 36569 INSJ PICC 5 YR+ W/O IMAGING: CPT | Mod: ,,, | Performed by: NURSE PRACTITIONER

## 2018-11-06 PROCEDURE — 80048 BASIC METABOLIC PNL TOTAL CA: CPT | Mod: 91

## 2018-11-06 PROCEDURE — 63600175 PHARM REV CODE 636 W HCPCS: Performed by: THORACIC SURGERY (CARDIOTHORACIC VASCULAR SURGERY)

## 2018-11-06 PROCEDURE — 27200667 HC PACEMAKER, TEMPORARY MONITORING, PER SHIFT

## 2018-11-06 PROCEDURE — 97162 PT EVAL MOD COMPLEX 30 MIN: CPT

## 2018-11-06 PROCEDURE — 97116 GAIT TRAINING THERAPY: CPT

## 2018-11-06 PROCEDURE — 85025 COMPLETE CBC W/AUTO DIFF WBC: CPT

## 2018-11-06 PROCEDURE — 25000242 PHARM REV CODE 250 ALT 637 W/ HCPCS: Performed by: INTERNAL MEDICINE

## 2018-11-06 PROCEDURE — 94640 AIRWAY INHALATION TREATMENT: CPT

## 2018-11-06 RX ORDER — GLUCAGON 1 MG
1 KIT INJECTION
Status: DISCONTINUED | OUTPATIENT
Start: 2018-11-06 | End: 2018-11-08 | Stop reason: HOSPADM

## 2018-11-06 RX ORDER — METOPROLOL TARTRATE 50 MG/1
50 TABLET ORAL 2 TIMES DAILY
Status: DISCONTINUED | OUTPATIENT
Start: 2018-11-06 | End: 2018-11-07

## 2018-11-06 RX ORDER — SIMETHICONE 80 MG
1 TABLET,CHEWABLE ORAL 4 TIMES DAILY PRN
Status: DISCONTINUED | OUTPATIENT
Start: 2018-11-06 | End: 2018-11-08 | Stop reason: HOSPADM

## 2018-11-06 RX ORDER — INSULIN ASPART 100 [IU]/ML
0-5 INJECTION, SOLUTION INTRAVENOUS; SUBCUTANEOUS
Status: DISCONTINUED | OUTPATIENT
Start: 2018-11-06 | End: 2018-11-08 | Stop reason: HOSPADM

## 2018-11-06 RX ORDER — IBUPROFEN 200 MG
16 TABLET ORAL
Status: DISCONTINUED | OUTPATIENT
Start: 2018-11-06 | End: 2018-11-08 | Stop reason: HOSPADM

## 2018-11-06 RX ORDER — IBUPROFEN 200 MG
24 TABLET ORAL
Status: DISCONTINUED | OUTPATIENT
Start: 2018-11-06 | End: 2018-11-08 | Stop reason: HOSPADM

## 2018-11-06 RX ADMIN — ARFORMOTEROL TARTRATE 15 MCG: 15 SOLUTION RESPIRATORY (INHALATION) at 08:11

## 2018-11-06 RX ADMIN — IPRATROPIUM BROMIDE AND ALBUTEROL SULFATE 3 ML: .5; 3 SOLUTION RESPIRATORY (INHALATION) at 08:11

## 2018-11-06 RX ADMIN — POTASSIUM CHLORIDE 20 MEQ: 1500 TABLET, EXTENDED RELEASE ORAL at 08:11

## 2018-11-06 RX ADMIN — DOCUSATE SODIUM 100 MG: 100 CAPSULE, LIQUID FILLED ORAL at 08:11

## 2018-11-06 RX ADMIN — SIMETHICONE CHEW TAB 80 MG 80 MG: 80 TABLET ORAL at 11:11

## 2018-11-06 RX ADMIN — BUDESONIDE 0.5 MG: 0.5 SUSPENSION RESPIRATORY (INHALATION) at 08:11

## 2018-11-06 RX ADMIN — CHLORHEXIDINE GLUCONATE 10 ML: 1.2 RINSE ORAL at 08:11

## 2018-11-06 RX ADMIN — IPRATROPIUM BROMIDE AND ALBUTEROL SULFATE 3 ML: .5; 3 SOLUTION RESPIRATORY (INHALATION) at 12:11

## 2018-11-06 RX ADMIN — ASPIRIN 81 MG: 81 TABLET, COATED ORAL at 08:11

## 2018-11-06 RX ADMIN — FUROSEMIDE 40 MG: 10 INJECTION, SOLUTION INTRAMUSCULAR; INTRAVENOUS at 08:11

## 2018-11-06 RX ADMIN — METOPROLOL TARTRATE 25 MG: 25 TABLET ORAL at 08:11

## 2018-11-06 RX ADMIN — IPRATROPIUM BROMIDE AND ALBUTEROL SULFATE 3 ML: .5; 3 SOLUTION RESPIRATORY (INHALATION) at 05:11

## 2018-11-06 RX ADMIN — SIMETHICONE CHEW TAB 80 MG 80 MG: 80 TABLET ORAL at 05:11

## 2018-11-06 RX ADMIN — CLOPIDOGREL 75 MG: 75 TABLET, FILM COATED ORAL at 08:11

## 2018-11-06 RX ADMIN — OXYCODONE HYDROCHLORIDE 5 MG: 5 TABLET ORAL at 02:11

## 2018-11-06 RX ADMIN — FENTANYL CITRATE 50 MCG: 50 INJECTION INTRAMUSCULAR; INTRAVENOUS at 11:11

## 2018-11-06 RX ADMIN — FAMOTIDINE 20 MG: 20 TABLET ORAL at 08:11

## 2018-11-06 RX ADMIN — POLYETHYLENE GLYCOL 3350 17 G: 17 POWDER, FOR SOLUTION ORAL at 08:11

## 2018-11-06 RX ADMIN — IPRATROPIUM BROMIDE AND ALBUTEROL SULFATE 3 ML: .5; 3 SOLUTION RESPIRATORY (INHALATION) at 01:11

## 2018-11-06 RX ADMIN — POTASSIUM CHLORIDE 20 MEQ: 200 INJECTION, SOLUTION INTRAVENOUS at 01:11

## 2018-11-06 RX ADMIN — METOPROLOL TARTRATE 50 MG: 50 TABLET ORAL at 08:11

## 2018-11-06 RX ADMIN — OXYCODONE HYDROCHLORIDE 10 MG: 5 TABLET ORAL at 06:11

## 2018-11-06 RX ADMIN — OXYCODONE HYDROCHLORIDE 10 MG: 5 TABLET ORAL at 08:11

## 2018-11-06 NOTE — PROCEDURES
"Erendira Pretty is a 73 y.o. male patient.    Temp: 98.2 °F (36.8 °C) (11/06/18 1100)  Pulse: 91 (11/06/18 1324)  Resp: (!) 24 (11/06/18 1324)  BP: 103/66 (11/06/18 1200)  SpO2: (!) 94 % (11/06/18 1324)  Weight: 116.3 kg (256 lb 6.3 oz) (11/06/18 1100)  Height: 5' 8" (172.7 cm) (11/06/18 1100)  Mallampati Scale: Class II  ASA Classification: Class 2    Mid Line  Date/Time: 11/6/2018 1:17 PM  Location procedure was performed: [facility] INTENSIVE CARE UNIT  Performed by: Alejandro Perez NP  Pre-operative Diagnosis: post op CABG  Post-operative diagnosis: post op CABG  Consent Done: Yes  Time out: Immediately prior to procedure a "time out" was called to verify the correct patient, procedure, equipment, support staff and site/side marked as required.  Indications: vascular access  Preparation: skin prepped with ChloraPrep  Skin prep agent dried: skin prep agent completely dried prior to procedure  Sterile barriers: all five maximum sterile barriers used - cap, mask, sterile gown, sterile gloves, and large sterile sheet  Hand hygiene: hand hygiene performed prior to central venous catheter insertion  Location details: right brachial  Catheter type: double lumen  Catheter size: 5 Fr  Catheter Length: 23cm    Ultrasound guidance: yes  Vessel Caliber: medium, patent, compressibility normal  Vascular Doppler: not done  Needle advanced into vessel with real time Ultrasound guidance.  Guidewire confirmed in vessel.  Sterile sheath used.  Manometry: No   Number of attempts: 1  Assessment: successful placement  Complications: none  Estimated blood loss (mL): 1  Specimens: No  Implants: No  Post-procedure: chlorhexidine patch,  sterile dressing applied and blood return through all ports  Complications: No          Alejandro Perez  11/6/2018  "

## 2018-11-06 NOTE — SUBJECTIVE & OBJECTIVE
Review of Systems   Constitution: Negative.   HENT: Negative.    Eyes: Negative.    Cardiovascular:        Incisional pain     Respiratory: Negative.    Endocrine: Negative.    Hematologic/Lymphatic: Negative.    Skin: Negative.    Musculoskeletal: Negative.    Gastrointestinal: Negative.    Genitourinary: Negative.    Neurological: Negative.    Psychiatric/Behavioral: Negative.    Allergic/Immunologic: Negative.      Objective:     Vital Signs (Most Recent):  Temp: 98.2 °F (36.8 °C) (11/06/18 1100)  Pulse: 90 (11/06/18 1100)  Resp: 18 (11/06/18 1100)  BP: 116/65 (11/06/18 1100)  SpO2: 96 % (11/06/18 1100) Vital Signs (24h Range):  Temp:  [97.5 °F (36.4 °C)-99.9 °F (37.7 °C)] 98.2 °F (36.8 °C)  Pulse:  [63-95] 90  Resp:  [16-30] 18  SpO2:  [93 %-100 %] 96 %  BP: ()/() 116/65  Arterial Line BP: ()/(45-74) 112/57     Weight: 116.3 kg (256 lb 6.3 oz)  Body mass index is 38.98 kg/m².     SpO2: 96 %  O2 Device (Oxygen Therapy): nasal cannula      Intake/Output Summary (Last 24 hours) at 11/6/2018 1202  Last data filed at 11/6/2018 1100  Gross per 24 hour   Intake 4493.38 ml   Output 2800 ml   Net 1693.38 ml       Lines/Drains/Airways     Drain                 Urethral Catheter 11/05/18 0730 Latex;Straight-tip;Temperature probe 16 Fr. 1 day         Closed/Suction Drain 11/05/18 1302 Anterior Chest Other (Comment) less than 1 day          Peripheral Intravenous Line                 Peripheral IV - Single Lumen 11/02/18 2139 Posterior;Right Forearm 3 days                Physical Exam   Constitutional: He is oriented to person, place, and time. He appears well-developed and well-nourished. No distress.   HENT:   Head: Normocephalic and atraumatic.   Eyes: Pupils are equal, round, and reactive to light. Right eye exhibits no discharge. Left eye exhibits no discharge.   Neck: Neck supple. No JVD present. No thyromegaly present.   Cardiovascular: Normal rate, regular rhythm, S1 normal, S2 normal and normal  heart sounds.   No murmur heard.  Pulmonary/Chest: Effort normal.   Diminished BS at bases    CABG site C/D/I; no bleeding erythema or drainage   Abdominal: Soft. He exhibits no distension. There is no tenderness. There is no rebound.   Musculoskeletal: He exhibits edema.   Neurological: He is alert and oriented to person, place, and time.   Skin: Skin is warm and dry. He is not diaphoretic. No erythema.   Psychiatric: He has a normal mood and affect. His behavior is normal. Thought content normal.   Nursing note and vitals reviewed.      Significant Labs:   CMP   Recent Labs   Lab 11/05/18  1332 11/05/18  2104 11/06/18  0500     140 140 139   K 4.9  4.9 4.4 3.6     109 108 111*   CO2 21*  21* 18* 22*   *  159* 285* 103   BUN 11  11 13 12   CREATININE 0.9  0.9 1.3 0.8   CALCIUM 7.8*  7.8* 8.7 7.9*   ANIONGAP 10  10 14 6*   ESTGFRAFRICA >60  >60 >60 >60   EGFRNONAA >60  >60 55* >60   , CBC   Recent Labs   Lab 11/05/18  1332  11/05/18  2104 11/06/18  0500   WBC 10.38  --  9.52 9.12  9.12   HGB 10.3*  --  9.6* 8.6*  8.6*   HCT 29.1*   < > 27.1* 24.1*  24.1*   *  --  103* 80*  80*    < > = values in this interval not displayed.   , Troponin No results for input(s): TROPONINI in the last 48 hours. and All pertinent lab results from the last 24 hours have been reviewed.    Significant Imaging: Echocardiogram:   2D echo with color flow doppler:   Results for orders placed or performed in visit on 10/25/18   2D echo with color flow doppler   Result Value Ref Range    Calculated EF 55 55 - 65    Diastolic Dysfunction No     Aortic Valve Stenosis MILD TO MODERATE (A)     Est. PA Systolic Pressure 27.04     Mitral Valve Mobility NORMAL     Tricuspid Valve Regurgitation MILD     and X-Ray: CXR: X-Ray Chest 1 View (CXR): No results found for this visit on 11/02/18. and X-Ray Chest PA and Lateral (CXR):   Results for orders placed or performed during the hospital encounter of 11/02/18    X-Ray Chest PA And Lateral    Narrative    EXAMINATION:  XR CHEST PA AND LATERAL    CLINICAL HISTORY:  Pre Op;    TECHNIQUE:  Single frontal view of the chest was performed.    COMPARISON:  None    FINDINGS:  The lungs are clear, with normal appearance of pulmonary vasculature and no pleural effusion or pneumothorax.    The cardiac silhouette is normal in size. The hilar and mediastinal contours are unremarkable.    Bones are intact.      Impression    No acute abnormality.      Electronically signed by: Vincent Gonzalez Jr., MD  Date:    11/02/2018  Time:    22:59

## 2018-11-06 NOTE — PROGRESS NOTES
Up to chair with standby assistance.  Tolerated well.  Hemodynamically stable - no vasopressive drips or inotropes infusing.  Pain controlled adequately with ordered analgesics.  CT output minimal.  Heart A-paced at 90 bpm - no ectopy noted.

## 2018-11-06 NOTE — PLAN OF CARE
Problem: Patient Care Overview  Goal: Plan of Care Review  Outcome: Ongoing (interventions implemented as appropriate)  Recommendations     Recommendation/Intervention: 1. When medically able, ADAT to cardiac diet. 2. Provided cardiac diet education w/ handouts from The Nutrition Care Manual. Pt. verbalized understanding, all concerns addressed. RD contact provided. 3.If unable to advance to diet within 72 hrs, consider alternate nutrition support. 4. Will continue to monitor.   Goals: meet >85% EEN/EPN  Nutrition Goal Status: new  Communication of RD Recs: (POC, sticky note)

## 2018-11-06 NOTE — ASSESSMENT & PLAN NOTE
R/LHC completed yesterday which revealed significant LM involving LCX, ostial and LAD-ostial, normal LV function, Mild AS with CVT consult for CABG  CVT consult for CABG on Monday, 11/5.   Continue ASA, Statin, BB, ARB  Denies chest pain or shortness of breath on exam.    11/5/18  -s/p CABG x 2 today  -Remains stable  -Resume ASA, statin, BB, ARB, Plavix when able    11/6/18  -POD # 1 s/p CABG x 2  -Remains stable CV wise  -Continue ASA, statin, BB, Plavix  -Resume ARB when able  -IS usage and abmulation

## 2018-11-06 NOTE — PT/OT/SLP EVAL
Physical Therapy Evaluation    Patient Name:  Erendira Pretty   MRN:  [id number]    Recommendations:     Discharge Recommendations:  home health PT   Discharge Equipment Recommendations: bath bench   Barriers to discharge: None    Assessment:     Erendira Pretty is a 73 y.o. male admitted with a medical diagnosis of Left main coronary artery disease.  He presents with the following impairments/functional limitations:  weakness, impaired endurance, impaired functional mobilty, gait instability, impaired balance, decreased safety awareness, decreased coordination, pain.    Rehab Prognosis:  GOOD; patient would benefit from acute skilled PT services to address these deficits and reach maximum level of function.      Recent Surgery: Procedure(s) (LRB):  CORONARY ARTERY BYPASS GRAFT (CABG) (N/A)  SURGICAL PROCUREMENT, VEIN, ENDOSCOPIC (Left)  ECHOCARDIOGRAM,TRANSESOPHAGEAL (N/A) 1 Day Post-Op    Plan:     During this hospitalization, patient to be seen 5 x/week to address the above listed problems via gait training, therapeutic activities, therapeutic exercises  · Plan of Care Expires:  11/13/18   Plan of Care Reviewed with: patient    Subjective     Communicated with NURSE PRICE prior to session.  Patient found SUPINE upon PT entry to room, agreeable to evaluation.      Chief Complaint:   Patient comments/goals:   Pain/Comfort:  · Pain Rating 1: 8/10  · Location 1: chest    Patients cultural, spiritual, [employer] conflicts given the current situation:     Living Environment:  PT LIVES WITH WIFE AND SON, SON ABLE TO ASSIST AS NEEDED, 1 STORY HOUSE NO STEPS, AMB INDEP COMMUNITY DISTANCES, STILL DRIVES, RETIRED BUT STILL WORKS, INDEP WITH ADL'S  Prior to admission, patients level of function was INDEP.  Patient has the following equipment: none.  DME owned (not currently used): none.  Upon discharge, patient will have assistance from FAMILY.    Objective:     Patient found with: blood pressure cuff, peripheral IV, oxygen,  telemetry, wound vac, pulse ox (continuous), hernandez catheter(EXTERNAL PACER)     General Precautions: Standard, fall, sternal   Orthopedic Precautions:N/A   Braces: N/A     Exams:  · Cognitive Exam:  Patient is oriented to Person, Place, Time and Situation  · Postural Exam:  Patient presented with the following abnormalities:    · -       Rounded shoulders  · Sensation:    · -       Intact  · RLE ROM: WFL  · RLE Strength: WFL  · LLE ROM: WFL  · LLE Strength: WFL    Functional Mobility:  · Bed Mobility:     · Rolling Left:  moderate assistance  · Rolling Right: moderate assistance  · Scooting: minimum assistance  · Supine to Sit: moderate assistance and of 2 persons  · Sit to Supine: moderate assistance  · Transfers:     · Sit to Stand:  minimum assistance with no AD  · Gait: PT AMB 90' X 2 TRIALS WITH CGA, SLOW PACED STEADY GAIT, MINIMAL SOB ON ROOM AIR  · Balance: FAIR    AM-PAC 6 CLICK MOBILITY  Total Score:14     Therapeutic Activities and Exercises:   PT EDUCATED IN ROLE OF P.T. AND POC, PT EDUCATED IN STERNAL PRECAUTIONS, PT EDUCATED IN BLE THEREX TO PERFORM WHILE SEATED IN CHAIR OR SUPINE IN BED    Patient left supine with all lines intact, call button in reach, NURSE notified and WIFE present.    GOALS:   Multidisciplinary Problems     Physical Therapy Goals        Problem: Physical Therapy Goal    Goal Priority Disciplines Outcome Goal Variances Interventions   Physical Therapy Goal     PT, PT/OT      Description:  LTG'S TO BE MET IN 7 DAYS (11-13-18)  1. PT WILL REQUIRE SBA FOR BED MOBILITY  2. PT WILL BE KANCHAN WITH TF'S  3. PT WILL ' KANCHAN  4. PT WILL DEMO G DYNAMIC BALANCE DURING GAIT                    History:     Past Medical History:   Diagnosis Date    Aortic stenosis     Asthma     BPH (benign prostatic hyperplasia)     CAD (coronary artery disease)     40% lesion 2002;lazo    Cirrhosis     Claudication 4/9/2014    Colon polyp     ED (erectile dysfunction)     Encounter for blood  transfusion     Ex-smoker     Hearing loss NEC     Hepatitis C     Cured;reji brown 2015    Hyperlipidemia     Hypertension     Hypothyroid     s/p tx graves    DELONTE on CPAP     Osteoarthritis     PVD (peripheral vascular disease)     Type 2 diabetes mellitus        Past Surgical History:   Procedure Laterality Date    CARDIAC CATHETERIZATION      CARPAL TUNNEL RELEASE Left     CATARACT EXTRACTION W/ INTRAOCULAR LENS  IMPLANT, BILATERAL  2008    COLONOSCOPY  8/2013    COLONOSCOPY N/A 5/30/2016    Procedure: COLONOSCOPY;  Surgeon: Anna Tomas MD;  Location: [facility] ENDO;  Service: Endoscopy;  Laterality: N/A;    COLONOSCOPY N/A 5/30/2016    Performed by Anna Tomas MD at [facility] ENDO    COLONOSCOPY N/A 8/23/2013    Performed by Nikhil Watson MD at [facility] ENDO    CORONARY ARTERY BYPASS GRAFT (CABG) N/A 11/5/2018    Procedure: CORONARY ARTERY BYPASS GRAFT (CABG);  Surgeon: Bear De Luna MD;  Location: [facility] OR;  Service: Cardiothoracic;  Laterality: N/A;  TWO VESSEL BYPASS WITH AORTOTOMY AND EXCISION OF ATHEROSCLEROTIC PLAQUE    CORONARY ARTERY BYPASS GRAFT (CABG) N/A 11/5/2018    Performed by Bear De Luna MD at [facility] OR    ECHOCARDIOGRAM,TRANSESOPHAGEAL N/A 11/5/2018    Performed by Bear De Luna MD at [facility] OR    ELBOW BURSA SURGERY Right 2010    ENDOSCOPIC HARVEST OF VEIN Left 11/5/2018    Procedure: SURGICAL PROCUREMENT, VEIN, ENDOSCOPIC;  Surgeon: Baer De Luna MD;  Location: [facility] OR;  Service: Cardiothoracic;  Laterality: Left;    ESOPHAGOGASTRODUODENOSCOPY (EGD) N/A 6/26/2017    Performed by Anna Tomas MD at [facility] ENDO    ESOPHAGOGASTRODUODENOSCOPY (EGD) N/A 5/30/2016    Performed by Anna Tomas MD at [facility] END[facility]    EYE SURGERY      KNEE SURGERY Right     RECTAL SURGERY  2012    SURGICAL PROCUREMENT, VEIN, ENDOSCOPIC Left 11/5/2018    Performed by Bear De Luna MD at [facility] OR    TRANSURETHRAL RESECTION OF PROSTATE (TURP) WITHOUT USE OF LASER N/A 6/19/2018    Procedure:  TURP, WITHOUT USING LASER;  Surgeon: Cooper Cordova IV, MD;  Location: [facility];  Service: Urology;  Laterality: N/A;    TRIGGER FINGER RELEASE Left     TURP, WITHOUT USING LASER N/A 6/19/2018    Performed by Cooper Cordova IV, MD at [facility] OR       Clinical Decision Making:     History  Co-morbidities and personal factors that may impact the plan of care Examination  Body Structures and Functions, activity limitations and participation restrictions that may impact the plan of care Clinical Presentation   Decision Making/ Complexity Score   Co-morbidities:   [] Time since onset of injury / illness / exacerbation  [] Status of current condition  []Patient's cognitive status and safety concerns    [] Multiple Medical Problems (see med hx)  Personal Factors:   [] Patient's age  [] Prior Level of function   [] Patient's home situation (environment and family support)  [] Patient's level of motivation  [] Expected progression of patient      HISTORY:(criteria)    [] 88239 - no personal factors/history    [] 73388 - has 1-2 personal factor/comorbidity     [] 59126 - has >3 personal factor/comorbidity     Body Regions:  [] Objective examination findings  [] Head     []  Neck  [] Trunk   [] Upper Extremity  [] Lower Extremity    Body Systems:  [] For communication ability, affect, cognition, language, and learning style: the assessment of the ability to make needs known, consciousness, orientation (person, place, and time), expected emotional /behavioral responses, and learning preferences (eg, learning barriers, education  needs)  [] For the neuromuscular system: a general assessment of gross coordinated movement (eg, balance, gait, locomotion, transfers, and transitions) and motor function  (motor control and motor learning)  [] For the musculoskeletal system: the assessment of gross symmetry, gross range of motion, gross strength, height, and weight  [] For the integumentary system: the assessment of  pliability(texture), presence of scar formation, skin color, and skin integrity  [] For cardiovascular/pulmonary system: the assessment of heart rate, respiratory rate, blood pressure, and edema     Activity limitations:    [] Patient's cognitive status and saf ety concerns          [] Status of current condition      [] Weight bearing restriction  [] Cardiopulmunary Restriction    Participation Restrictions:   [] Goals and goal agreement with the patient     [] Rehab potential (prognosis) and probable outcome      Examination of Body System: (criteria)    [] 87909 - addressing 1-2 elements    [] 74483 - addressing a total of 3 or more elements     [] 81364 -  Addressing a total of 4 or more elements         Clinical Presentation: (criteria)  Choose one     On examination of body system using standardized tests and measures patient presents with (CHOOSE ONE) elements from any of the following: body structures and functions, activity limitations, and/or participation restrictions.  Leading to a clinical presentation that is considered (CHOOSE ONE)                              Clinical Decision Making  (Eval Complexity):  Choose One     Time Tracking:     PT Received On: 11/06/18  PT Start Time: 0930     PT Stop Time: 0955  PT Total Time (min): 25 min     Billable Minutes: Evaluation 15 and Gait Training 10    Karmen Harmon, PT  11/06/2018

## 2018-11-06 NOTE — PROGRESS NOTES
[facility] -   Cardiology  Progress Note    Patient Name: Erendira Pretty  MRN: [id number]  Admission Date: 11/2/2018  Hospital Length of Stay: 1 days  Code Status: Full Code   Attending Physician: Bear De Luna MD   Primary Care Physician: Cortes Roblero MD  Expected Discharge Date:   Principal Problem:Left main coronary artery disease    Subjective:   HPI:  Mr. Pretty is a 73 year old male who presented to [facility] due to LHC per Dr. Gallardo. R/LHC revealed significant LM involving LCX, ostial and LAD-ostial, normal LV function, Mild AS and CVT consulted. CVT recommended CABG on Monday, 11/5. Patient placed in outpatient recovery awaiting CABG for Monday.     Hospital Course:   11/3/18-Patient seen and examined in room, lying in bed. Denies chest pain or shortness of breath today.     11/4/18-Patient seen and examined in room, lying in bed. Denies chest pan or shortness of breath today. Labs reviewed, K+ 3.6, Cr 1.0. Pending CABG in AM.     11/5/18-Patient seen and examined, s/p CABG x 2 this AM. Remains stable CV wise.     11/6/18-Patient seen and examined today, POD #1 s/p CABG x 2. Sitting up in chair. Feels ok, complains of incisional pain. Labs stable.         Review of Systems   Constitution: Negative.   HENT: Negative.    Eyes: Negative.    Cardiovascular:        Incisional pain     Respiratory: Negative.    Endocrine: Negative.    Hematologic/Lymphatic: Negative.    Skin: Negative.    Musculoskeletal: Negative.    Gastrointestinal: Negative.    Genitourinary: Negative.    Neurological: Negative.    Psychiatric/Behavioral: Negative.    Allergic/Immunologic: Negative.      Objective:     Vital Signs (Most Recent):  Temp: 98.2 °F (36.8 °C) (11/06/18 1100)  Pulse: 90 (11/06/18 1100)  Resp: 18 (11/06/18 1100)  BP: 116/65 (11/06/18 1100)  SpO2: 96 % (11/06/18 1100) Vital Signs (24h Range):  Temp:  [97.5 °F (36.4 °C)-99.9 °F (37.7 °C)] 98.2 °F (36.8 °C)  Pulse:  [63-95] 90  Resp:  [16-30] 18  SpO2:   [93 %-100 %] 96 %  BP: ()/() 116/65  Arterial Line BP: ()/(45-74) 112/57     Weight: 116.3 kg (256 lb 6.3 oz)  Body mass index is 38.98 kg/m².     SpO2: 96 %  O2 Device (Oxygen Therapy): nasal cannula      Intake/Output Summary (Last 24 hours) at 11/6/2018 1202  Last data filed at 11/6/2018 1100  Gross per 24 hour   Intake 4493.38 ml   Output 2800 ml   Net 1693.38 ml       Lines/Drains/Airways     Drain                 Urethral Catheter 11/05/18 0730 Latex;Straight-tip;Temperature probe 16 Fr. 1 day         Closed/Suction Drain 11/05/18 1302 Anterior Chest Other (Comment) less than 1 day          Peripheral Intravenous Line                 Peripheral IV - Single Lumen 11/02/18 2139 Posterior;Right Forearm 3 days                Physical Exam   Constitutional: He is oriented to person, place, and time. He appears well-developed and well-nourished. No distress.   HENT:   Head: Normocephalic and atraumatic.   Eyes: Pupils are equal, round, and reactive to light. Right eye exhibits no discharge. Left eye exhibits no discharge.   Neck: Neck supple. No JVD present. No thyromegaly present.   Cardiovascular: Normal rate, regular rhythm, S1 normal, S2 normal and normal heart sounds.   No murmur heard.  Pulmonary/Chest: Effort normal.   Diminished BS at bases    CABG site C/D/I; no bleeding erythema or drainage   Abdominal: Soft. He exhibits no distension. There is no tenderness. There is no rebound.   Musculoskeletal: He exhibits edema.   Neurological: He is alert and oriented to person, place, and time.   Skin: Skin is warm and dry. He is not diaphoretic. No erythema.   Psychiatric: He has a normal mood and affect. His behavior is normal. Thought content normal.   Nursing note and vitals reviewed.      Significant Labs:   CMP   Recent Labs   Lab 11/05/18  1332 11/05/18  2104 11/06/18  0500     140 140 139   K 4.9  4.9 4.4 3.6     109 108 111*   CO2 21*  21* 18* 22*   *  159* 285* 103    BUN 11  11 13 12   CREATININE 0.9  0.9 1.3 0.8   CALCIUM 7.8*  7.8* 8.7 7.9*   ANIONGAP 10  10 14 6*   ESTGFRAFRICA >60  >60 >60 >60   EGFRNONAA >60  >60 55* >60   , CBC   Recent Labs   Lab 11/05/18  1332  11/05/18  2104 11/06/18  0500   WBC 10.38  --  9.52 9.12  9.12   HGB 10.3*  --  9.6* 8.6*  8.6*   HCT 29.1*   < > 27.1* 24.1*  24.1*   *  --  103* 80*  80*    < > = values in this interval not displayed.   , Troponin No results for input(s): TROPONINI in the last 48 hours. and All pertinent lab results from the last 24 hours have been reviewed.    Significant Imaging: Echocardiogram:   2D echo with color flow doppler:   Results for orders placed or performed in visit on 10/25/18   2D echo with color flow doppler   Result Value Ref Range    Calculated EF 55 55 - 65    Diastolic Dysfunction No     Aortic Valve Stenosis MILD TO MODERATE (A)     Est. PA Systolic Pressure 27.04     Mitral Valve Mobility NORMAL     Tricuspid Valve Regurgitation MILD     and X-Ray: CXR: X-Ray Chest 1 View (CXR): No results found for this visit on 11/02/18. and X-Ray Chest PA and Lateral (CXR):   Results for orders placed or performed during the hospital encounter of 11/02/18   X-Ray Chest PA And Lateral    Narrative    EXAMINATION:  XR CHEST PA AND LATERAL    CLINICAL HISTORY:  Pre Op;    TECHNIQUE:  Single frontal view of the chest was performed.    COMPARISON:  None    FINDINGS:  The lungs are clear, with normal appearance of pulmonary vasculature and no pleural effusion or pneumothorax.    The cardiac silhouette is normal in size. The hilar and mediastinal contours are unremarkable.    Bones are intact.      Impression    No acute abnormality.      Electronically signed by: Vincent Gonzalez Jr., MD  Date:    11/02/2018  Time:    22:59     Assessment and Plan:   Patient with critical LM disease recovering well s/p CABG x 2. Continue current post-op care and management.     * Left main coronary artery disease    R/LHC  completed yesterday which revealed significant LM involving LCX, ostial and LAD-ostial, normal LV function, Mild AS with CVT consult for CABG  CVT consult for CABG on Monday, 11/5.   Continue ASA, Statin, BB, ARB  Denies chest pain or shortness of breath on exam.    11/5/18  -s/p CABG x 2 today  -Remains stable  -Resume ASA, statin, BB, ARB, Plavix when able    11/6/18  -POD # 1 s/p CABG x 2  -Remains stable CV wise  -Continue ASA, statin, BB, Plavix  -Resume ARB when able  -IS usage and abmulation     Mixed hyperlipidemia    -Continue statin and niacin     Aortic stenosis mild    -Mild AS per [facility]   -Stable     Obesity, unspecified    -Encourage weight loss     Essential hypertension    -Continue BB  -Resume ARB once able         VTE Risk Mitigation (From admission, onward)        Ordered     IP VTE LOW RISK PATIENT  Once      11/02/18 1800     Place DHEERAJ hose  Until discontinued      11/02/18 1800     Place sequential compression device  Until discontinued      11/02/18 1800          Aster Petty PA-C  Cardiology  [facility]    Chart reviewed. Dr. Wang examined patient and agrees with plan as outlined above.

## 2018-11-06 NOTE — PLAN OF CARE
Late Entry. Patient intubated post CABG. Unable to obtain an optimal d/c plan     11/05/18 1537   Discharge Assessment   Assessment Type Discharge Planning Assessment   Confirmed/corrected address and phone number on facesheet? No   Assessment information obtained from? Medical Record   Expected Length of Stay (days) (TBD)   Communicated expected length of stay with patient/caregiver no   Prior to hospitilization cognitive status: Coma/Sedated/Intubated   Current cognitive status: Coma/Sedated/Intubated   Lives With spouse   Able to Return to Prior Arrangements unable to determine at this time (comments)   Readmission Within The Last 30 Days other (see comments)   Patient currently being followed by outpatient case management? Unable to determine (comments)   Equipment Currently Used at Home other (see comments)   Do you have any problems affording any of your prescribed medications? TBD   Does the patient receive services at the [facility]? No   Discharge Plan A Other   Discharge Plan B Other   Patient/Family In Agreement With Plan unable to assess

## 2018-11-06 NOTE — HOSPITAL COURSE
11/06/2018  The patient is postop day 1.  Status post coronary artery bypass grafting x2 and ascending aortotomy for excision of atherosclerotic plaques.    11/07/2018  The patient is postop day 2.  Status post coronary artery bypass grafting x2 and ascending aortotomy 4 at excision of atherosclerotic plaques    11/08/2018  The patient is postop day 3.  Status post coronary artery bypass grafting x2 and ascending aorta putting me for excision times tolerated Plavix.

## 2018-11-06 NOTE — PT/OT/SLP EVAL
Occupational Therapy   Evaluation    Name: Erendira Pretty  MRN: [id number]  Admitting Diagnosis:  Left main coronary artery disease 1 Day Post-Op    Recommendations:     Discharge Recommendations: home health OT  Discharge Equipment Recommendations:  bath bench  Barriers to discharge:  None    History:     Occupational Profile:  Living Environment: LIVES WITH SPOUSE IN ONE STORY HOME, NO STEPS TO ENTER  Previous level of function: INDEPENDENT WITH ADLS AND GAIT  Roles and Routines: DRIVING, RETIRED  Equipment Used at Home:     Assistance upon Discharge: SPOUSE CAN SUPERVISE, HOWEVER SON WILL BE ABLE TO PHYSICALLY ASSIST AS NEEDED    Past Medical History:   Diagnosis Date    Aortic stenosis     Asthma     BPH (benign prostatic hyperplasia)     CAD (coronary artery disease)     40% lesion 2002;lazo    Cirrhosis     Claudication 4/9/2014    Colon polyp     ED (erectile dysfunction)     Encounter for blood transfusion     Ex-smoker     Hearing loss NEC     Hepatitis C     Cured;reji brown 2015    Hyperlipidemia     Hypertension     Hypothyroid     s/p tx graves    DELONTE on CPAP     Osteoarthritis     PVD (peripheral vascular disease)     Type 2 diabetes mellitus        Past Surgical History:   Procedure Laterality Date    CARDIAC CATHETERIZATION      CARPAL TUNNEL RELEASE Left     CATARACT EXTRACTION W/ INTRAOCULAR LENS  IMPLANT, BILATERAL  2008    COLONOSCOPY  8/2013    COLONOSCOPY N/A 5/30/2016    Procedure: COLONOSCOPY;  Surgeon: Anna Tomas MD;  Location: [facility] ENDO;  Service: Endoscopy;  Laterality: N/A;    COLONOSCOPY N/A 5/30/2016    Performed by Anna Tomas MD at [facility] ENDO    COLONOSCOPY N/A 8/23/2013    Performed by Nikhil Watson MD at [facility] ENDO    CORONARY ARTERY BYPASS GRAFT (CABG) N/A 11/5/2018    Procedure: CORONARY ARTERY BYPASS GRAFT (CABG);  Surgeon: Bear De Luna MD;  Location: [facility] OR;  Service: Cardiothoracic;  Laterality: N/A;  TWO VESSEL BYPASS WITH AORTOTOMY AND  EXCISION OF ATHEROSCLEROTIC PLAQUE    CORONARY ARTERY BYPASS GRAFT (CABG) N/A 11/5/2018    Performed by [facility] at [facility] OR    ECHOCARDIOGRAM,TRANSESOPHAGEAL N/A 11/5/2018    Performed by Bear De Luna MD at [facility] OR    ELBOW BURSA SURGERY Right 2010    ENDOSCOPIC HARVEST OF VEIN Left 11/5/2018    Procedure: SURGICAL PROCUREMENT, VEIN, ENDOSCOPIC;  Surgeon: Bear De Luna MD;  Location: [facility] OR;  Service: Cardiothoracic;  Laterality: Left;    ESOPHAGOGASTRODUODENOSCOPY (EGD) N/A 6/26/2017    Performed by Anna Tomas MD at [facility] ENDO    ESOPHAGOGASTRODUODENOSCOPY (EGD) N/A 5/30/2016    Performed by Anna Tomas MD at [facility] ENDO    EYE SURGERY      KNEE SURGERY Right     RECTAL SURGERY  2012    SURGICAL PROCUREMENT, VEIN, ENDOSCOPIC Left 11/5/2018    Performed by Bear De Luna MD at [facility] OR    TRANSURETHRAL RESECTION OF PROSTATE (TURP) WITHOUT USE OF LASER N/A 6/19/2018    Procedure: TURP, WITHOUT USING LASER;  Surgeon: Cooper Cordova IV, MD;  Location: [facility] OR;  Service: Urology;  Laterality: N/A;    TRIGGER FINGER RELEASE Left     TURP, WITHOUT USING LASER N/A 6/19/2018    Performed by Cooper Cordova IV, MD at [facility] OR       Subjective     Chief Complaint: STERNAL PAIN  Patient/Family Comments/goals: RETURN TO PLOF    Pain/Comfort:  · Pain Rating 1: 8/10  · Location 1: chest  · Pain Addressed 1: Cessation of Activity, Reposition, Nurse notified  · Pain Rating Post-Intervention 1: 8/10    Patients cultural, spiritual, Anabaptist conflicts given the current situation:      Objective:     Communicated with: NURSE PRICE prior to session.  Patient found all lines intact, call button in reach, NURSING notified and SPOUSE present and blood pressure cuff, peripheral IV, telemetry, wound vac, SCD, oxygen, hernandez catheter upon OT entry to room.    General Precautions: Standard, fall, sternal   Orthopedic Precautions:N/A   Braces: N/A     Occupational Performance:    Bed Mobility:    · Patient  completed Rolling/Turning to Right with moderate assistance and 2 persons  · Patient completed Scooting/Bridging with contact guard assistance  · Patient completed Supine to Sit with moderate assistance and 2 persons  · Patient completed Sit to Supine with moderate assistance    Functional Mobility/Transfers:  · Patient completed Sit <> Stand Transfer with minimum assistance  with  no assistive device   · Functional Mobility: PT AMBULATED MIN A 2X90 FEET WITH MIN A    Activities of Daily Living:  · Lower Body Dressing: total assistance SOCKS  · Toileting: total assistance SHELLEY CATH    Cognitive/Visual Perceptual:  Cognitive/Psychosocial Skills:     -       Oriented to: Person, Place, Time and Situation   -       Follows Commands/attention:Follows multistep  commands  -       Memory: No Deficits noted  -       Safety awareness/insight to disability: impaired     Physical Exam:  Balance:    -       FAIR  Upper Extremity Range of Motion:     -       Right Upper Extremity: WFL AROM  -       Left Upper Extremity: WFL AROM AROM  Upper Extremity Strength:    -       Right Upper Extremity: WFL  -       Left Upper Extremity: WFL WFL    AMPAC 6 Click ADL:  AMPAC Total Score: 17    Treatment & Education:  PT PARTICIPATED IN INITIAL OT EVALUATION TODAY TO ASSESS CURRENT LEVEL OF FUNCTION. PLEASE SEE ABOVE FOR FINDINGS. PT WILL BENEFIT FROM SKILLED OT SERVICES TO INCREASE FUNCTIONAL INDEPENDENCE AND SAFETY.   Education:    Patient left supine with all lines intact, call button in reach and NURSE notified    Assessment:     Erendira Pretty is a 73 y.o. male with a medical diagnosis of Left main coronary artery disease.  He presents with the following performance deficits affecting function: weakness, impaired endurance, impaired self care skills, impaired functional mobilty, impaired balance, decreased upper extremity function, decreased safety awareness, orthopedic precautions.      Rehab Prognosis: Good; patient would benefit  "from acute skilled OT services to address these deficits and reach maximum level of function.         Clinical Decision Making:     3.  OT High:  "Pt evaluation falls under high complexity for evaluation category due to 5+ performance deficits identified with comprehensive assessments and significant modifications/assistance required. An expanded review of history and occupational profile completed in addition to expanded review of physical, cognitive and psychosocial history. Several treatment options considered in care."     Plan:     Patient to be seen 3 x/week to address the above listed problems via self-care/home management, therapeutic activities, therapeutic exercises  · Plan of Care Expires: 11/13/18  · Plan of Care Reviewed with: patient    This Plan of care has been discussed with the patient who was involved in its development and understands and is in agreement with the identified goals and treatment plan    GOALS:   Multidisciplinary Problems     Occupational Therapy Goals        Problem: Occupational Therapy Goal    Goal Priority Disciplines Outcome Interventions   Occupational Therapy Goal     OT, PT/OT     Description:  Goals to be met by: 11/13/18     Patient will increase functional independence with ADLs by performing:    UE Dressing with Minimal Assistance.  LE Dressing with Moderate Assistance.  Grooming while standing at sink with Contact Guard Assistance.  Toileting from toilet with Minimal Assistance for hygiene and clothing management.   Toilet transfer to toilet with Minimal Assistance.  Upper extremity exercise program x10 reps per handout, with supervision.                      Time Tracking:     OT Date of Treatment: 11/06/18  OT Start Time: 0950  OT Stop Time: 1015  OT Total Time (min): 25 min    Billable Minutes:Evaluation 15  Therapeutic Activity 10    Misty Morales OT  11/6/2018    "

## 2018-11-06 NOTE — PLAN OF CARE
Problem: Patient Care Overview  Goal: Plan of Care Review  Outcome: Ongoing (interventions implemented as appropriate)  Pt doing well, awaiting bed on Tele. VSS. Ambulated in halls today with PT. Spouse at bedside, POC discussed.

## 2018-11-06 NOTE — PLAN OF CARE
Problem: Patient Care Overview  Goal: Plan of Care Review  Outcome: Ongoing (interventions implemented as appropriate)  POC discussed w/patient, verbalized understanding. A-paced on monitor; PM at bedside. Hemodynamically stable. Epi and vasopressin gtts titrated off. 250mL of albumin given. Insulin gtt titrated per Q1H accuchecks. Dressings CDI; wound vac in place to midline incision. 375mL total sanguinous drainage from chest tubes. Only 10mL from ANA this shift.  CHG bath this AM, pt did well ambulating to chair. PRN fentanyl and oxycodone given for pain; pt now much more comfortable up in chair. Fall precautions in place, bed alarm on. Patient denies needs at this time.

## 2018-11-06 NOTE — NURSING
Dr De Luna here seeing patient, update given to MD. MD removed all 3 chest tubes and ANA drain from LLE. Received verbal order to discontinue the swan aditi and artline.

## 2018-11-06 NOTE — PLAN OF CARE
11/06/18 1418   Medicare Message   Important Message from Medicare regarding Discharge Appeal Rights Given to patient/caregiver;Explained to patient/caregiver;Signed/date by patient/caregiver   Date IMM was signed 11/06/18   Time IMM was signed 1418

## 2018-11-06 NOTE — PROGRESS NOTES
[facility] -   Cardiothoracic Surgery  Progress Note    Patient Name: Erendira Pretty  MRN: [id number]  Admission Date: 11/2/2018  Hospital Length of Stay: 1 days  Code Status: Full Code   Attending Physician: Bear De Luna MD   Referring Provider: Cortes Roblero MD  Principal Problem:Left main coronary artery disease            Subjective:     Post-Op Info:  Procedure(s) (LRB):  CORONARY ARTERY BYPASS GRAFT (CABG) (N/A)  SURGICAL PROCUREMENT, VEIN, ENDOSCOPIC (Left)  ECHOCARDIOGRAM,TRANSESOPHAGEAL (N/A)   1 Day Post-Op     Interval History:  The patient is postop day 1 status post coronary artery bypass grafting x2 and ascending aortotomy for excision of atherosclerotic plaques.  The patient has no complaints.  The patient feels good.    ROS  Medications:  Continuous Infusions:   niCARdipine       Scheduled Meds:   arformoterol  15 mcg Nebulization BID    aspirin  81 mg Oral Daily    budesonide  0.5 mg Nebulization BID    chlorhexidine  10 mL Mouth/Throat BID    docusate sodium  100 mg Oral BID    famotidine  20 mg Oral BID    furosemide  40 mg Intravenous BID    metoprolol tartrate  50 mg Oral BID    nozaseptin   Each Nare BID    polyethylene glycol  17 g Oral Daily    potassium chloride  20 mEq Oral Q12H     PRN Meds:acetaminophen, albumin human 5%, albuterol sulfate, dextrose 50%, dextrose 50%, fentaNYL, fentaNYL, glucagon (human recombinant), glucose, glucose, insulin aspart U-100, lactated ringers, magnesium sulfate IVPB, metoclopramide HCl, ondansetron, oxyCODONE, oxyCODONE, potassium chloride in water **AND** potassium chloride in water **AND** potassium chloride in water, simethicone     Objective:     Vital Signs (Most Recent):  Temp: 98.2 °F (36.8 °C) (11/06/18 1100)  Pulse: 91 (11/06/18 1400)  Resp: 20 (11/06/18 1400)  BP: 117/71 (11/06/18 1400)  SpO2: 96 % (11/06/18 1400) Vital Signs (24h Range):  Temp:  [98.2 °F (36.8 °C)-99.9 °F (37.7 °C)] 98.2 °F (36.8 °C)  Pulse:  [87-95]  91  Resp:  [13-30] 20  SpO2:  [93 %-98 %] 96 %  BP: ()/() 117/71  Arterial Line BP: ()/(45-67) 112/57     Weight: 116.3 kg (256 lb 6.3 oz)  Body mass index is 38.98 kg/m².    SpO2: 96 %  O2 Device (Oxygen Therapy): nasal cannula    Intake/Output - Last 3 Shifts       11/04 0700 - 11/05 0659 11/05 0700 - 11/06 0659 11/06 0700 - 11/07 0659    P.O. 1200  600    I.V. (mL/kg)  5015.5 (43.1) 127.9 (1.1)    IV Piggyback  350 100    Total Intake(mL/kg) 1200 (10.6) 5365.5 (46.1) 827.9 (7.1)    Urine (mL/kg/hr) 3500 (1.3) 1265 (0.5) 920 (0.9)    Drains  15 0    Stool       Chest Tube  655     Total Output 3500 1935 920    Net -2300 +3430.5 -92.1                 Lines/Drains/Airways     Peripherally Inserted Central Catheter Line                 PICC Double Lumen 11/06/18 1319 right brachial less than 1 day          Drain                 Closed/Suction Drain 11/05/18 1302 Anterior Chest Other (Comment) 1 day         Urethral Catheter 11/05/18 0730 Latex;Straight-tip;Temperature probe 16 Fr. 1 day                Physical Exam   Constitutional: He is oriented to person, place, and time. He appears well-developed and well-nourished. No distress.   HENT:   Head: Normocephalic and atraumatic.   Neck: JVD present.   Cardiovascular: Normal rate, regular rhythm and normal heart sounds.   Pulmonary/Chest: Effort normal and breath sounds normal.   Abdominal: Soft. Bowel sounds are normal.   Musculoskeletal: He exhibits no edema or deformity.   Neurological: He is alert and oriented to person, place, and time.   Skin: Skin is warm and dry. He is not diaphoretic.   Psychiatric: He has a normal mood and affect.       Significant Labs:  All pertinent labs from the last 24 hours have been reviewed.    Significant Diagnostics:  I have reviewed all pertinent imaging results/findings within the past 24 hours.    Assessment/Plan:     * Left main coronary artery disease    The patient is a 73-year-old male who was worked up for  complaints of shortness of breath.  Cardiac catheterization today shows significant left main coronary artery disease.  The patient is a candidate for coronary artery bypass grafting which will be scheduled for 11/05/2018.  The risks and benefits of surgery were explained to the patient. The patient understands and has agreed to proceed with surgery..     S/P CABG x 2    11/06/2018  The patient is postop day 1 status post coronary artery bypass grafting x2 and status post aortotomy for excision of of atherosclerotic plaques.  Overall the patient is doing well. Transferred to telemetry unit.  Neuro: The patient is awake alert and oriented x3.  He moves all 4.  Pain is well controlled with pain meds.  Cardiac:  Patient is off all drips.  Blood pressure is controlled with metoprolol.  Will escalate metoprolol dose as he tolerates.  Respiratory:  Patient was extubated on postop day 0.  Good sats on nasal cannula.  GI:  Patient is tolerating p.o. intake.  Advanced as tolerated.  Renal:  Patient has good urine output. creatinine is 0.8.  Heme:  The patient has a hematocrit of 24.1.  Platelet count is 80.  Will hold of Plavix until platelet count is above 100.  Activities:  Patient is out of bed and ambulating.  Advance as tolerated.  Lines tubes and drains patient has a mid level line, and pacer wires.  Will discontinue Armenta.         Bear De Luna MD  Cardiothoracic Surgery  [facility] -

## 2018-11-06 NOTE — ASSESSMENT & PLAN NOTE
No active bleeding  Conservative transfusion protocol  Follow CBC  Transfuse as needed per surgery

## 2018-11-06 NOTE — SUBJECTIVE & OBJECTIVE
Review of Systems   Constitutional: Positive for malaise/fatigue. Negative for chills and fever.   HENT: Negative for congestion.    Eyes: Negative for blurred vision.   Respiratory: Positive for cough. Negative for sputum production and shortness of breath.    Cardiovascular: Positive for chest pain (post op). Negative for leg swelling.   Gastrointestinal: Negative for abdominal pain, nausea and vomiting.   Genitourinary: Negative for dysuria.   Musculoskeletal: Negative for myalgias.   Skin: Negative for rash.   Neurological: Positive for weakness. Negative for dizziness, focal weakness and headaches.   Endo/Heme/Allergies: Does not bruise/bleed easily.   Psychiatric/Behavioral: The patient is not nervous/anxious.        Objective:     Vital Signs (Most Recent):  Temp: 98.2 °F (36.8 °C) (11/06/18 1100)  Pulse: 91 (11/06/18 1200)  Resp: 13 (11/06/18 1200)  BP: 103/66 (11/06/18 1200)  SpO2: 96 % (11/06/18 1200) Vital Signs (24h Range):  Temp:  [97.5 °F (36.4 °C)-99.9 °F (37.7 °C)] 98.2 °F (36.8 °C)  Pulse:  [63-95] 91  Resp:  [13-30] 13  SpO2:  [93 %-100 %] 96 %  BP: ()/() 103/66  Arterial Line BP: ()/(45-74) 112/57     Weight: 116.3 kg (256 lb 6.3 oz)  Body mass index is 38.98 kg/m².      Intake/Output Summary (Last 24 hours) at 11/6/2018 1223  Last data filed at 11/6/2018 1200  Gross per 24 hour   Intake 3993.38 ml   Output 2855 ml   Net 1138.38 ml       Physical Exam   Constitutional: He is oriented to person, place, and time. Vital signs are normal. He appears well-developed and well-nourished. He is cooperative.  Non-toxic appearance. He does not have a sickly appearance. He appears ill. No distress. Nasal cannula in place.   HENT:   Head: Normocephalic and atraumatic.   Mouth/Throat: Oropharynx is clear and moist and mucous membranes are normal.   Eyes: EOM and lids are normal. Pupils are equal, round, and reactive to light.   Neck: Trachea normal. Neck supple. Carotid bruit is not present.    Cardiovascular: Normal rate, regular rhythm and normal pulses.   Pulses:       Radial pulses are 2+ on the right side, and 2+ on the left side.        Dorsalis pedis pulses are 2+ on the right side, and 2+ on the left side.   Paced    Pulmonary/Chest: Effort normal and breath sounds normal. No accessory muscle usage. No respiratory distress. He exhibits tenderness (post op).       Abdominal: Soft. Normal appearance. He exhibits no distension. Bowel sounds are decreased. There is no tenderness.   Genitourinary: Penis normal.   Genitourinary Comments: Armenta in place   Musculoskeletal: Normal range of motion.        Right foot: There is no deformity.        Left foot: There is no deformity.   Mild edema   Lymphadenopathy:     He has no cervical adenopathy.   Neurological: He is alert and oriented to person, place, and time.   Skin: Skin is warm and dry. Capillary refill takes less than 2 seconds. No rash noted. No cyanosis.   Psychiatric: He has a normal mood and affect. His speech is normal and behavior is normal. Judgment and thought content normal. Cognition and memory are normal.       Vents:  Vent Mode: (S) Spont (11/05/18 1432)  Ventilator Initiated: Yes (11/05/18 1334)  Set Rate: 0 bmp (11/05/18 1432)  Vt Set: 410 mL (11/05/18 1432)  Pressure Support: (S) 10 cmH20 (11/05/18 1432)  PEEP/CPAP: 5 cmH20 (11/05/18 1432)  Oxygen Concentration (%): 28 (11/06/18 0835)  Peak Airway Pressure: 16 cmH2O (11/05/18 1432)  Plateau Pressure: 0 cmH20 (11/05/18 1432)  Total Ve: 12 mL (11/05/18 1432)  Negative Inspiratory Force (cm H2O): -43 (11/05/18 1452)  F/VT Ratio<105 (RSBI): (!) 30.61 (11/05/18 1432)    Lines/Drains/Airways     Drain                 Urethral Catheter 11/05/18 0730 Latex;Straight-tip;Temperature probe 16 Fr. 1 day         Closed/Suction Drain 11/05/18 1302 Anterior Chest Other (Comment) less than 1 day          Peripheral Intravenous Line                 Peripheral IV - Single Lumen 11/02/18 4301  Posterior;Right Forearm 3 days                Significant Labs:    CBC/Anemia Profile:  Recent Labs   Lab 11/05/18  1332 11/05/18  1334 11/05/18  2104 11/06/18  0500   WBC 10.38  --  9.52 9.12  9.12   HGB 10.3*  --  9.6* 8.6*  8.6*   HCT 29.1* 26* 27.1* 24.1*  24.1*   *  --  103* 80*  80*   MCV 95  --  96 96  96   RDW 13.2  --  13.3 13.4  13.4        Chemistries:  Recent Labs   Lab 11/05/18  1332 11/05/18  2024 11/05/18  2104 11/06/18  0500     140  --  140 139   K 4.9  4.9  --  4.4 3.6     109  --  108 111*   CO2 21*  21*  --  18* 22*   BUN 11  11  --  13 12   CREATININE 0.9  0.9  --  1.3 0.8   CALCIUM 7.8*  7.8*  --  8.7 7.9*   MG 3.4* 2.5  --   --        Coagulation:   Recent Labs   Lab 11/06/18  0500   INR 1.2   APTT 31.4     POCT Glucose:   Recent Labs   Lab 11/06/18  0833 11/06/18  0912 11/06/18  1114   POCTGLUCOSE 114* 148* 154*     All pertinent labs within the past 24 hours have been reviewed.    Significant Imaging:  CXR: I have reviewed all pertinent results/findings within the past 24 hours and my personal findings are:  no acute pulm process noted

## 2018-11-06 NOTE — ASSESSMENT & PLAN NOTE
11/06/2018  The patient is postop day 1 status post coronary artery bypass grafting x2 and status post aortotomy for excision of of atherosclerotic plaques.  Overall the patient is doing well. Transferred to telemetry unit.  Neuro: The patient is awake alert and oriented x3.  He moves all 4.  Pain is well controlled with pain meds.  Cardiac:  Patient is off all drips.  Blood pressure is controlled with metoprolol.  Will escalate metoprolol dose as he tolerates.  Respiratory:  Patient was extubated on postop day 0.  Good sats on nasal cannula.  GI:  Patient is tolerating p.o. intake.  Advanced as tolerated.  Renal:  Patient has good urine output. creatinine is 0.8.  Heme:  The patient has a hematocrit of 24.1.  Platelet count is 80.  Will hold of Plavix until platelet count is above 100.  Activities:  Patient is out of bed and ambulating.  Advance as tolerated.  Lines tubes and drains patient has a mid level line, and pacer wires.  Will discontinue Armenta.

## 2018-11-06 NOTE — PROGRESS NOTES
[facility] -   Adult Nutrition  Consult Note    SUMMARY     Recommendations    Recommendation/Intervention: 1. When medically able, ADAT to cardiac diet. 2. Provided cardiac diet education w/ handouts from The Nutrition Care Manual. Pt. verbalized understanding, all concerns addressed. RD contact provided. 3.If unable to advance to diet within 72 hrs, consider alternate nutrition support. 4. Will continue to monitor.   Goals: meet >85% EEN/EPN  Nutrition Goal Status: new  Communication of RD Recs: (POC, sticky note)     Reason for Assessment  Reason for Assessment: consult   Dx:  1. Left main coronary artery disease    2. Angina pectoris    3. CAD (coronary artery disease)    4. Coronary artery disease, angina presence unspecified, unspecified vessel or lesion type, unspecified whether native or transplanted heart    5. Shortness of breath    6. Post-operative state    7. Acute blood loss anemia    8. Obesity, unspecified classification, unspecified obesity type, unspecified whether serious comorbidity present    9. On mechanically assisted ventilation    10. DELONTE on CPAP    11. S/P CABG x 2    12. Type 2 diabetes mellitus without complication, without long-term current use of insulin      Hx:  Past Medical History:   Diagnosis Date    Aortic stenosis     Asthma     BPH (benign prostatic hyperplasia)     CAD (coronary artery disease)     40% lesion 2002;lazo    Cirrhosis     Claudication 4/9/2014    Colon polyp     ED (erectile dysfunction)     Encounter for blood transfusion     Ex-smoker     Hearing loss NEC     Hepatitis C     Cured;reji brown 2015    Hyperlipidemia     Hypertension     Hypothyroid     s/p tx graves    DELONTE on CPAP     Osteoarthritis     PVD (peripheral vascular disease)     Type 2 diabetes mellitus      Interdisciplinary Rounds: attended  General Information Comments: POD #1 s/p CABG x 2, Pt reports good appetite at home. Pt reported the he consumes  "75%-100% of meals prior to admission, but appetite was low today. Pt report no recent wt loss. Pt appears well nourished. NFPE not performed due to pt showing no s/s of malnutrition. Pt reported no cultural/Gnosticism food preferences. Pt verbalized understanding of cardiac and diabetic diet but does not follow one at home. Pt was educated on cardiac and diabetic diet.     Nutrition Discharge Planning: cardiac diet    Nutrition Risk Screen    Nutrition Risk Screen: no indicators present    Nutrition/Diet History    Do you have any cultural, spiritual, Latter-day conflicts, given your current situation?: none    Anthropometrics    Temp: 98.2 °F (36.8 °C)  Height Method: Measured  Height: 5' 8" (172.7 cm)  Height (inches): 68 in  Weight Method: Bed Scale  Weight: 116.3 kg (256 lb 6.3 oz)  Weight (lb): 256.4 lb  Ideal Body Weight (IBW), Male: 154 lb  % Ideal Body Weight, Male (lb): 166.49 lb  BMI (Calculated): 39.1  BMI Grade: 35 - 39.9 - obesity - grade II       Lab/Procedures/Meds    BMP  Lab Results   Component Value Date     11/06/2018    K 3.6 11/06/2018     (H) 11/06/2018    CO2 22 (L) 11/06/2018    BUN 12 11/06/2018    CREATININE 0.8 11/06/2018    CALCIUM 7.9 (L) 11/06/2018    ANIONGAP 6 (L) 11/06/2018    ESTGFRAFRICA >60 11/06/2018    EGFRNONAA >60 11/06/2018     Lab Results   Component Value Date    HGBA1C 6.2 (H) 11/04/2018     Recent Labs   Lab 11/06/18  1114   POCTGLUCOSE 154*     Lab Results   Component Value Date    ALBUMIN 3.5 11/04/2018       Lab Results   Component Value Date    CALCIUM 7.9 (L) 11/06/2018            Physical Findings/Assessment    Overall appearance: nourished   Oral/Mouth Cavity: tooth/teeth missing  Skin: Osbaldo score 16     Estimated/Assessed Needs    Weight Used For Calorie Calculations: 116.3 kg (256 lb 6.3 oz)  Energy Calorie Requirements (kcal): 2326  Energy Need Method: [place]-St Jeor x1.2  Protein Requirements: 139 g  Weight Used For Protein Calculations: 116.3 kg " (256 lb 6.3 oz)  Fluid Requirements (mL): or per MD  Fluid Need Method: RDA Method  RDA Method (mL): 2326  CHO Requirement: 50%EEN      Nutrition Prescription Ordered    Current Diet Order: clear liquid diet    Evaluation of Received Nutrient/Fluid Intake    Intake/Output Summary (Last 24 hours) at 11/6/2018 1147  Last data filed at 11/6/2018 1100  Gross per 24 hour   Intake 4493.38 ml   Output 2800 ml   Net 1693.38 ml         % Intake of Estimated Energy Needs: 75%-100%  % Meal Intake:75%-100%     Nutrition Risk    Level of Risk/Frequency of Follow-up: (2xweekly)    Assessment and Plan  Nutrition Problem  Inadequate energy intake    Related to (etiology):   Clear Liquid Diet per surgery     Signs and Symptoms (as evidenced by):   Not meeting EEN/EPN    Interventions/Recommendations (treatment strategy):  See Above    Nutrition Diagnosis Status:   New         Monitor and Evaluation    Food and Nutrient Intake: energy intake, food and beverage intake  Food and Nutrient Adminstration: diet order  Knowledge/Beliefs/Attitudes: food and nutrition knowledge/skill  Anthropometric Measurements: weight  Biochemical Data, Medical Tests and Procedures: glucose/endocrine profile  Nutrition-Focused Physical Findings: overall appearance     Nutrition Follow-Up    RD Follow-up?: Yes

## 2018-11-06 NOTE — PLAN OF CARE
Problem: Patient Care Overview  Goal: Plan of Care Review  Outcome: Ongoing (interventions implemented as appropriate)  Recommendation/Intervention: 1. Continue current diet and when medically able, ADAT to cardiac diet. 2. Provided cardiac diet education w/ handouts from The Nutrition Care Manual. Pt. verbalized understanding, all concerns addressed. Pt verbalized that he doesn't follow a cardiac or diabetic diet at home. 3. Will continue to monitor.   Goals: meet >85% EEN/EPN  Nutrition Goal Status: new  Communication of RD Recs: (POC, sticky note)

## 2018-11-06 NOTE — PROGRESS NOTES
[facility] [place]  Critical Care Medicine  Progress Note    Patient Name: Erendira Pretty  MRN: [id number]  Admission Date: 11/2/2018  Hospital Length of Stay: 1 days  Code Status: Full Code  Attending Provider: Bear De Luna MD  Primary Care Provider: Cortes Roblero MD   Principal Problem: Left main coronary artery disease    Subjective:     HPI:  Mr Pretty is a 74 yo WM with a PMH of CAD, Hep C Cirrhosis, HLD, HTN, mild AS, BPH, DELONTE on CPAP, DM2 and OA.  He was seen in Cards clinic for follow up and complained of CP and SOB.  He had LHC done 11/2 revealing left main dz of LCx and LAD with normal LV function and mild AS.  CVT surgery consulted and today taken to OR undergoing 2-vessel CABG.    Hospital/ICU Course:  11/5 - Admitted to ICU post op from OR on mech vent and low dose Epi infusion  11/6 - Tolerated extubation yesterday evening well.  Up to chair today and chest tubes removed.  Looks good    Review of Systems   Constitutional: Positive for malaise/fatigue. Negative for chills and fever.   HENT: Negative for congestion.    Eyes: Negative for blurred vision.   Respiratory: Positive for cough. Negative for sputum production and shortness of breath.    Cardiovascular: Positive for chest pain (post op). Negative for leg swelling.   Gastrointestinal: Negative for abdominal pain, nausea and vomiting.   Genitourinary: Negative for dysuria.   Musculoskeletal: Negative for myalgias.   Skin: Negative for rash.   Neurological: Positive for weakness. Negative for dizziness, focal weakness and headaches.   Endo/Heme/Allergies: Does not bruise/bleed easily.   Psychiatric/Behavioral: The patient is not nervous/anxious.        Objective:     Vital Signs (Most Recent):  Temp: 98.2 °F (36.8 °C) (11/06/18 1100)  Pulse: 91 (11/06/18 1200)  Resp: 13 (11/06/18 1200)  BP: 103/66 (11/06/18 1200)  SpO2: 96 % (11/06/18 1200) Vital Signs (24h Range):  Temp:  [97.5 °F (36.4 °C)-99.9 °F (37.7 °C)] 98.2 °F (36.8 °C)  Pulse:   [63-95] 91  Resp:  [13-30] 13  SpO2:  [93 %-100 %] 96 %  BP: ()/() 103/66  Arterial Line BP: ()/(45-74) 112/57     Weight: 116.3 kg (256 lb 6.3 oz)  Body mass index is 38.98 kg/m².      Intake/Output Summary (Last 24 hours) at 11/6/2018 1223  Last data filed at 11/6/2018 1200  Gross per 24 hour   Intake 3993.38 ml   Output 2855 ml   Net 1138.38 ml       Physical Exam   Constitutional: He is oriented to person, place, and time. Vital signs are normal. He appears well-developed and well-nourished. He is cooperative.  Non-toxic appearance. He does not have a sickly appearance. He appears ill. No distress. Nasal cannula in place.   HENT:   Head: Normocephalic and atraumatic.   Mouth/Throat: Oropharynx is clear and moist and mucous membranes are normal.   Eyes: EOM and lids are normal. Pupils are equal, round, and reactive to light.   Neck: Trachea normal. Neck supple. Carotid bruit is not present.   Cardiovascular: Normal rate, regular rhythm and normal pulses.   Pulses:       Radial pulses are 2+ on the right side, and 2+ on the left side.        Dorsalis pedis pulses are 2+ on the right side, and 2+ on the left side.   Paced    Pulmonary/Chest: Effort normal and breath sounds normal. No accessory muscle usage. No respiratory distress. He exhibits tenderness (post op).       Abdominal: Soft. Normal appearance. He exhibits no distension. Bowel sounds are decreased. There is no tenderness.   Genitourinary: Penis normal.   Genitourinary Comments: Armenta in place   Musculoskeletal: Normal range of motion.        Right foot: There is no deformity.        Left foot: There is no deformity.   Mild edema   Lymphadenopathy:     He has no cervical adenopathy.   Neurological: He is alert and oriented to person, place, and time.   Skin: Skin is warm and dry. Capillary refill takes less than 2 seconds. No rash noted. No cyanosis.   Psychiatric: He has a normal mood and affect. His speech is normal and behavior is  normal. Judgment and thought content normal. Cognition and memory are normal.       Vents:  Vent Mode: (S) Spont (11/05/18 1432)  Ventilator Initiated: Yes (11/05/18 1334)  Set Rate: 0 bmp (11/05/18 1432)  Vt Set: 410 mL (11/05/18 1432)  Pressure Support: (S) 10 cmH20 (11/05/18 1432)  PEEP/CPAP: 5 cmH20 (11/05/18 1432)  Oxygen Concentration (%): 28 (11/06/18 0835)  Peak Airway Pressure: 16 cmH2O (11/05/18 1432)  Plateau Pressure: 0 cmH20 (11/05/18 1432)  Total Ve: 12 mL (11/05/18 1432)  Negative Inspiratory Force (cm H2O): -43 (11/05/18 1452)  F/VT Ratio<105 (RSBI): (!) 30.61 (11/05/18 1432)    Lines/Drains/Airways     Drain                 Urethral Catheter 11/05/18 0730 Latex;Straight-tip;Temperature probe 16 Fr. 1 day         Closed/Suction Drain 11/05/18 1302 Anterior Chest Other (Comment) less than 1 day          Peripheral Intravenous Line                 Peripheral IV - Single Lumen 11/02/18 2139 Posterior;Right Forearm 3 days                Significant Labs:    CBC/Anemia Profile:  Recent Labs   Lab 11/05/18 1332 11/05/18 1334 11/05/18 2104 11/06/18  0500   WBC 10.38  --  9.52 9.12  9.12   HGB 10.3*  --  9.6* 8.6*  8.6*   HCT 29.1* 26* 27.1* 24.1*  24.1*   *  --  103* 80*  80*   MCV 95  --  96 96  96   RDW 13.2  --  13.3 13.4  13.4        Chemistries:  Recent Labs   Lab 11/05/18 1332 11/05/18 2024 11/05/18 2104 11/06/18  0500     140  --  140 139   K 4.9  4.9  --  4.4 3.6     109  --  108 111*   CO2 21*  21*  --  18* 22*   BUN 11  11  --  13 12   CREATININE 0.9  0.9  --  1.3 0.8   CALCIUM 7.8*  7.8*  --  8.7 7.9*   MG 3.4* 2.5  --   --        Coagulation:   Recent Labs   Lab 11/06/18  0500   INR 1.2   APTT 31.4     POCT Glucose:   Recent Labs   Lab 11/06/18  0833 11/06/18  0912 11/06/18  1114   POCTGLUCOSE 114* 148* 154*     All pertinent labs within the past 24 hours have been reviewed.    Significant Imaging:  CXR: I have reviewed all pertinent results/findings within  the past 24 hours and my personal findings are:  no acute pulm process noted      Assessment/Plan:     Cardiac/Vascular   * Left main coronary artery disease    POD #1 S/P CABG X 2  Cards following  Cont lopressor, ASA, Plavix     Aortic stenosis mild    IV lasix  Cards following     S/P CABG x 2    POD #1  Per CVT surgery   Chest tubes removed     Oncology   Acute blood loss anemia    No active bleeding  Conservative transfusion protocol  Follow CBC  Transfuse as needed per surgery     Endocrine   Obesity, unspecified    Encouraged weight loss     Type 2 diabetes mellitus with post op hyperglycemia    Glucose stable  Stop insulin infusion and change to SSI      Other   DELONTE on CPAP    May use home noct CPAP       Preventive Measures and Monitoring:   Stress Ulcer: Pepcid  Nutrition: Cardiac Diet  Glucose control: SSI  Bowel prophylaxis: Miralax  DVT prophylaxis: SCDs  Hx CAD on B-Blocker: Lopressor  Head of Bed/Reposition: Elevate HOB and turn Q1-2 hours   Early Mobility: OOB today  Armenta Day: #2  Code Status: Full  Pneumonia Vaccine: UTD  Flu Vaccine: UTD     Counseling/Consultation:I have discussed the care of this patient in detail with the bedside nursing staff and Dr. Molina    Recommend transfer to Tele.  Will place Mid line at request of CVT surgery then will sign off.  Please call if needed.        Alejandro Perez NP  Critical Care Medicine  [facility] -

## 2018-11-06 NOTE — SUBJECTIVE & OBJECTIVE
Interval History:  The patient is postop day 1 status post coronary artery bypass grafting x2 and ascending aortotomy for excision of atherosclerotic plaques.  The patient has no complaints.  The patient feels good.    ROS  Medications:  Continuous Infusions:   niCARdipine       Scheduled Meds:   arformoterol  15 mcg Nebulization BID    aspirin  81 mg Oral Daily    budesonide  0.5 mg Nebulization BID    chlorhexidine  10 mL Mouth/Throat BID    docusate sodium  100 mg Oral BID    famotidine  20 mg Oral BID    furosemide  40 mg Intravenous BID    metoprolol tartrate  50 mg Oral BID    nozaseptin   Each Nare BID    polyethylene glycol  17 g Oral Daily    potassium chloride  20 mEq Oral Q12H     PRN Meds:acetaminophen, albumin human 5%, albuterol sulfate, dextrose 50%, dextrose 50%, fentaNYL, fentaNYL, glucagon (human recombinant), glucose, glucose, insulin aspart U-100, lactated ringers, magnesium sulfate IVPB, metoclopramide HCl, ondansetron, oxyCODONE, oxyCODONE, potassium chloride in water **AND** potassium chloride in water **AND** potassium chloride in water, simethicone     Objective:     Vital Signs (Most Recent):  Temp: 98.2 °F (36.8 °C) (11/06/18 1100)  Pulse: 91 (11/06/18 1400)  Resp: 20 (11/06/18 1400)  BP: 117/71 (11/06/18 1400)  SpO2: 96 % (11/06/18 1400) Vital Signs (24h Range):  Temp:  [98.2 °F (36.8 °C)-99.9 °F (37.7 °C)] 98.2 °F (36.8 °C)  Pulse:  [87-95] 91  Resp:  [13-30] 20  SpO2:  [93 %-98 %] 96 %  BP: ()/() 117/71  Arterial Line BP: ()/(45-67) 112/57     Weight: 116.3 kg (256 lb 6.3 oz)  Body mass index is 38.98 kg/m².    SpO2: 96 %  O2 Device (Oxygen Therapy): nasal cannula    Intake/Output - Last 3 Shifts       11/04 0700 - 11/05 0659 11/05 0700 - 11/06 0659 11/06 0700 - 11/07 0659    P.O. 1200  600    I.V. (mL/kg)  5015.5 (43.1) 127.9 (1.1)    IV Piggyback  350 100    Total Intake(mL/kg) 1200 (10.6) 5365.5 (46.1) 827.9 (7.1)    Urine (mL/kg/hr) 3500 (1.3) 1265  (0.5) 920 (0.9)    Drains  15 0    Stool       Chest Tube  655     Total Output 3500 1935 920    Net -2300 +3430.5 -92.1                 Lines/Drains/Airways     Peripherally Inserted Central Catheter Line                 PICC Double Lumen 11/06/18 1319 right brachial less than 1 day          Drain                 Closed/Suction Drain 11/05/18 1302 Anterior Chest Other (Comment) 1 day         Urethral Catheter 11/05/18 0730 Latex;Straight-tip;Temperature probe 16 Fr. 1 day                Physical Exam   Constitutional: He is oriented to person, place, and time. He appears well-developed and well-nourished. No distress.   HENT:   Head: Normocephalic and atraumatic.   Neck: JVD present.   Cardiovascular: Normal rate, regular rhythm and normal heart sounds.   Pulmonary/Chest: Effort normal and breath sounds normal.   Abdominal: Soft. Bowel sounds are normal.   Musculoskeletal: He exhibits no edema or deformity.   Neurological: He is alert and oriented to person, place, and time.   Skin: Skin is warm and dry. He is not diaphoretic.   Psychiatric: He has a normal mood and affect.       Significant Labs:  All pertinent labs from the last 24 hours have been reviewed.    Significant Diagnostics:  I have reviewed all pertinent imaging results/findings within the past 24 hours.

## 2018-11-07 LAB
ANION GAP SERPL CALC-SCNC: 10 MMOL/L
ANION GAP SERPL CALC-SCNC: 10 MMOL/L
BASOPHILS # BLD AUTO: 0.01 K/UL
BASOPHILS NFR BLD: 0.1 %
BUN SERPL-MCNC: 15 MG/DL
BUN SERPL-MCNC: 16 MG/DL
CALCIUM SERPL-MCNC: 8.1 MG/DL
CALCIUM SERPL-MCNC: 8.3 MG/DL
CHLORIDE SERPL-SCNC: 96 MMOL/L
CHLORIDE SERPL-SCNC: 99 MMOL/L
CO2 SERPL-SCNC: 26 MMOL/L
CO2 SERPL-SCNC: 26 MMOL/L
CREAT SERPL-MCNC: 0.8 MG/DL
CREAT SERPL-MCNC: 1 MG/DL
DIFFERENTIAL METHOD: ABNORMAL
EOSINOPHIL # BLD AUTO: 0 K/UL
EOSINOPHIL NFR BLD: 0.2 %
ERYTHROCYTE [DISTWIDTH] IN BLOOD BY AUTOMATED COUNT: 13.4 %
EST. GFR  (AFRICAN AMERICAN): >60 ML/MIN/1.73 M^2
EST. GFR  (AFRICAN AMERICAN): >60 ML/MIN/1.73 M^2
EST. GFR  (NON AFRICAN AMERICAN): >60 ML/MIN/1.73 M^2
EST. GFR  (NON AFRICAN AMERICAN): >60 ML/MIN/1.73 M^2
GLUCOSE SERPL-MCNC: 178 MG/DL
GLUCOSE SERPL-MCNC: 207 MG/DL
HCT VFR BLD AUTO: 27 %
HGB BLD-MCNC: 9.4 G/DL
LYMPHOCYTES # BLD AUTO: 1.4 K/UL
LYMPHOCYTES NFR BLD: 11.3 %
MAGNESIUM SERPL-MCNC: 1.9 MG/DL
MAGNESIUM SERPL-MCNC: 2.1 MG/DL
MCH RBC QN AUTO: 33.7 PG
MCHC RBC AUTO-ENTMCNC: 34.8 G/DL
MCV RBC AUTO: 97 FL
MONOCYTES # BLD AUTO: 1.3 K/UL
MONOCYTES NFR BLD: 10.3 %
NEUTROPHILS # BLD AUTO: 9.4 K/UL
NEUTROPHILS NFR BLD: 78.1 %
PLATELET # BLD AUTO: 109 K/UL
PMV BLD AUTO: 9.7 FL
POCT GLUCOSE: 163 MG/DL (ref 70–110)
POCT GLUCOSE: 173 MG/DL (ref 70–110)
POCT GLUCOSE: 175 MG/DL (ref 70–110)
POCT GLUCOSE: 185 MG/DL (ref 70–110)
POTASSIUM SERPL-SCNC: 3.7 MMOL/L
POTASSIUM SERPL-SCNC: 4.2 MMOL/L
RBC # BLD AUTO: 2.79 M/UL
SODIUM SERPL-SCNC: 132 MMOL/L
SODIUM SERPL-SCNC: 135 MMOL/L
WBC # BLD AUTO: 12.1 K/UL

## 2018-11-07 PROCEDURE — 97110 THERAPEUTIC EXERCISES: CPT

## 2018-11-07 PROCEDURE — 83735 ASSAY OF MAGNESIUM: CPT | Mod: 91

## 2018-11-07 PROCEDURE — 97116 GAIT TRAINING THERAPY: CPT | Performed by: PHYSICAL THERAPIST

## 2018-11-07 PROCEDURE — 94761 N-INVAS EAR/PLS OXIMETRY MLT: CPT

## 2018-11-07 PROCEDURE — 94799 UNLISTED PULMONARY SVC/PX: CPT

## 2018-11-07 PROCEDURE — 21400001 HC TELEMETRY ROOM

## 2018-11-07 PROCEDURE — 80048 BASIC METABOLIC PNL TOTAL CA: CPT | Mod: 91

## 2018-11-07 PROCEDURE — 99233 SBSQ HOSP IP/OBS HIGH 50: CPT | Mod: ,,, | Performed by: INTERNAL MEDICINE

## 2018-11-07 PROCEDURE — 94640 AIRWAY INHALATION TREATMENT: CPT

## 2018-11-07 PROCEDURE — 27000221 HC OXYGEN, UP TO 24 HOURS

## 2018-11-07 PROCEDURE — 85025 COMPLETE CBC W/AUTO DIFF WBC: CPT

## 2018-11-07 PROCEDURE — 25000003 PHARM REV CODE 250: Performed by: THORACIC SURGERY (CARDIOTHORACIC VASCULAR SURGERY)

## 2018-11-07 PROCEDURE — S5571 INSULIN DISPOS PEN 3 ML: HCPCS | Performed by: THORACIC SURGERY (CARDIOTHORACIC VASCULAR SURGERY)

## 2018-11-07 PROCEDURE — 25000242 PHARM REV CODE 250 ALT 637 W/ HCPCS: Performed by: INTERNAL MEDICINE

## 2018-11-07 PROCEDURE — 63600175 PHARM REV CODE 636 W HCPCS: Performed by: THORACIC SURGERY (CARDIOTHORACIC VASCULAR SURGERY)

## 2018-11-07 PROCEDURE — 97116 GAIT TRAINING THERAPY: CPT

## 2018-11-07 PROCEDURE — 99900035 HC TECH TIME PER 15 MIN (STAT)

## 2018-11-07 PROCEDURE — 97530 THERAPEUTIC ACTIVITIES: CPT | Performed by: PHYSICAL THERAPIST

## 2018-11-07 RX ORDER — CLOPIDOGREL BISULFATE 75 MG/1
75 TABLET ORAL DAILY
Status: DISCONTINUED | OUTPATIENT
Start: 2018-11-07 | End: 2018-11-08 | Stop reason: HOSPADM

## 2018-11-07 RX ORDER — CHLORHEXIDINE GLUCONATE ORAL RINSE 1.2 MG/ML
10 SOLUTION DENTAL 2 TIMES DAILY
Status: DISCONTINUED | OUTPATIENT
Start: 2018-11-07 | End: 2018-11-07 | Stop reason: SDUPTHER

## 2018-11-07 RX ORDER — METOPROLOL TARTRATE 50 MG/1
100 TABLET ORAL 2 TIMES DAILY
Status: DISCONTINUED | OUTPATIENT
Start: 2018-11-07 | End: 2018-11-08 | Stop reason: HOSPADM

## 2018-11-07 RX ADMIN — BUDESONIDE 0.5 MG: 0.5 SUSPENSION RESPIRATORY (INHALATION) at 07:11

## 2018-11-07 RX ADMIN — CHLORHEXIDINE GLUCONATE 10 ML: 1.2 RINSE ORAL at 10:11

## 2018-11-07 RX ADMIN — OXYCODONE HYDROCHLORIDE 10 MG: 5 TABLET ORAL at 10:11

## 2018-11-07 RX ADMIN — ARFORMOTEROL TARTRATE 15 MCG: 15 SOLUTION RESPIRATORY (INHALATION) at 07:11

## 2018-11-07 RX ADMIN — POTASSIUM CHLORIDE 20 MEQ: 1500 TABLET, EXTENDED RELEASE ORAL at 09:11

## 2018-11-07 RX ADMIN — CHLORHEXIDINE GLUCONATE 10 ML: 1.2 RINSE ORAL at 09:11

## 2018-11-07 RX ADMIN — POLYETHYLENE GLYCOL 3350 17 G: 17 POWDER, FOR SOLUTION ORAL at 09:11

## 2018-11-07 RX ADMIN — POTASSIUM CHLORIDE 20 MEQ: 1500 TABLET, EXTENDED RELEASE ORAL at 10:11

## 2018-11-07 RX ADMIN — FAMOTIDINE 20 MG: 20 TABLET ORAL at 09:11

## 2018-11-07 RX ADMIN — DOCUSATE SODIUM 100 MG: 100 CAPSULE, LIQUID FILLED ORAL at 09:11

## 2018-11-07 RX ADMIN — METOPROLOL TARTRATE 100 MG: 50 TABLET ORAL at 09:11

## 2018-11-07 RX ADMIN — ASPIRIN 81 MG: 81 TABLET, COATED ORAL at 09:11

## 2018-11-07 RX ADMIN — INSULIN DETEMIR 10 UNITS: 100 INJECTION, SOLUTION SUBCUTANEOUS at 10:11

## 2018-11-07 RX ADMIN — DOCUSATE SODIUM 100 MG: 100 CAPSULE, LIQUID FILLED ORAL at 10:11

## 2018-11-07 RX ADMIN — METOPROLOL TARTRATE 100 MG: 50 TABLET ORAL at 10:11

## 2018-11-07 RX ADMIN — FAMOTIDINE 20 MG: 20 TABLET ORAL at 10:11

## 2018-11-07 RX ADMIN — ARFORMOTEROL TARTRATE 15 MCG: 15 SOLUTION RESPIRATORY (INHALATION) at 08:11

## 2018-11-07 RX ADMIN — FUROSEMIDE 40 MG: 10 INJECTION, SOLUTION INTRAMUSCULAR; INTRAVENOUS at 09:11

## 2018-11-07 RX ADMIN — BUDESONIDE 0.5 MG: 0.5 SUSPENSION RESPIRATORY (INHALATION) at 08:11

## 2018-11-07 RX ADMIN — CLOPIDOGREL BISULFATE 75 MG: 75 TABLET ORAL at 09:11

## 2018-11-07 NOTE — SUBJECTIVE & OBJECTIVE
Interval History:  Postop day 2.  Status post coronary artery bypass grafting x2 and ascending aortotomy for excision of atherosclerotic plaques.  Overall the patient is doing well.  Patient has no complaints.    ROS  Medications:  Continuous Infusions:  Scheduled Meds:   arformoterol  15 mcg Nebulization BID    aspirin  81 mg Oral Daily    budesonide  0.5 mg Nebulization BID    chlorhexidine  10 mL Mouth/Throat BID    clopidogrel  75 mg Oral Daily    docusate sodium  100 mg Oral BID    famotidine  20 mg Oral BID    furosemide  40 mg Intravenous BID    insulin detemir U-100  10 Units Subcutaneous QHS    metoprolol tartrate  100 mg Oral BID    nozaseptin   Each Nare BID    polyethylene glycol  17 g Oral Daily    potassium chloride  20 mEq Oral Q12H     PRN Meds:albuterol sulfate, dextrose 50%, dextrose 50%, glucagon (human recombinant), glucose, glucose, insulin aspart U-100, lactated ringers, metoclopramide HCl, ondansetron, oxyCODONE, oxyCODONE, simethicone     Objective:     Vital Signs (Most Recent):  Temp: 98.8 °F (37.1 °C) (11/07/18 1100)  Pulse: 90 (11/07/18 1300)  Resp: 16 (11/07/18 1100)  BP: (!) 108/59 (11/07/18 1100)  SpO2: 95 % (11/07/18 1100) Vital Signs (24h Range):  Temp:  [97.3 °F (36.3 °C)-99.6 °F (37.6 °C)] 98.8 °F (37.1 °C)  Pulse:  [83-92] 90  Resp:  [14-26] 16  SpO2:  [92 %-96 %] 95 %  BP: (102-157)/(59-72) 108/59     Weight: 114.5 kg (252 lb 6.8 oz)  Body mass index is 38.38 kg/m².    SpO2: 95 %  O2 Device (Oxygen Therapy): nasal cannula w/ humidification    Intake/Output - Last 3 Shifts       11/05 0700 - 11/06 0659 11/06 0700 - 11/07 0659 11/07 0700 - 11/08 0659    P.O.  900     I.V. (mL/kg) 5015.5 (43.1) 127.9 (1.1)     IV Piggyback 350 100     Total Intake(mL/kg) 5365.5 (46.1) 1127.9 (9.9)     Urine (mL/kg/hr) 1265 (0.5) 1170 (0.4)     Drains 15 0     Chest Tube 655      Total Output 1935 1170     Net +3430.5 -42.1            Stool Occurrence  1 x           Lines/Drains/Airways      Peripherally Inserted Central Catheter Line                 PICC Double Lumen 11/06/18 1319 right brachial 1 day                Physical Exam   Constitutional: He is oriented to person, place, and time. He appears well-developed and well-nourished. No distress.   HENT:   Head: Normocephalic and atraumatic.   Cardiovascular: Normal rate, regular rhythm and normal heart sounds.   Pulmonary/Chest: Effort normal and breath sounds normal.   Abdominal: Soft. Bowel sounds are normal.   Musculoskeletal: Normal range of motion. He exhibits no edema or deformity.   Neurological: He is alert and oriented to person, place, and time.   Skin: Skin is warm and dry. He is not diaphoretic.       Significant Labs:  All pertinent labs from the last 24 hours have been reviewed.    Significant Diagnostics:  I have reviewed all pertinent imaging results/findings within the past 24 hours.

## 2018-11-07 NOTE — PROGRESS NOTES
[facility] -   Cardiothoracic Surgery  Progress Note    Patient Name: Erendira Pretty  MRN: [id number]  Admission Date: 11/2/2018  Hospital Length of Stay: 2 days  Code Status: Full Code   Attending Physician: Bear De Luna MD   Referring Provider: Cortes Roblero MD  Principal Problem:Left main coronary artery disease            Subjective:     Post-Op Info:  Procedure(s) (LRB):  CORONARY ARTERY BYPASS GRAFT (CABG) (N/A)  SURGICAL PROCUREMENT, VEIN, ENDOSCOPIC (Left)  ECHOCARDIOGRAM,TRANSESOPHAGEAL (N/A)   2 Days Post-Op     Interval History:  Postop day 2.  Status post coronary artery bypass grafting x2 and ascending aortotomy for excision of atherosclerotic plaques.  Overall the patient is doing well.  Patient has no complaints.    ROS  Medications:  Continuous Infusions:  Scheduled Meds:   arformoterol  15 mcg Nebulization BID    aspirin  81 mg Oral Daily    budesonide  0.5 mg Nebulization BID    chlorhexidine  10 mL Mouth/Throat BID    clopidogrel  75 mg Oral Daily    docusate sodium  100 mg Oral BID    famotidine  20 mg Oral BID    furosemide  40 mg Intravenous BID    insulin detemir U-100  10 Units Subcutaneous QHS    metoprolol tartrate  100 mg Oral BID    nozaseptin   Each Nare BID    polyethylene glycol  17 g Oral Daily    potassium chloride  20 mEq Oral Q12H     PRN Meds:albuterol sulfate, dextrose 50%, dextrose 50%, glucagon (human recombinant), glucose, glucose, insulin aspart U-100, lactated ringers, metoclopramide HCl, ondansetron, oxyCODONE, oxyCODONE, simethicone     Objective:     Vital Signs (Most Recent):  Temp: 98.8 °F (37.1 °C) (11/07/18 1100)  Pulse: 90 (11/07/18 1300)  Resp: 16 (11/07/18 1100)  BP: (!) 108/59 (11/07/18 1100)  SpO2: 95 % (11/07/18 1100) Vital Signs (24h Range):  Temp:  [97.3 °F (36.3 °C)-99.6 °F (37.6 °C)] 98.8 °F (37.1 °C)  Pulse:  [83-92] 90  Resp:  [14-26] 16  SpO2:  [92 %-96 %] 95 %  BP: (102-157)/(59-72) 108/59     Weight: 114.5 kg (252 lb 6.8  oz)  Body mass index is 38.38 kg/m².    SpO2: 95 %  O2 Device (Oxygen Therapy): nasal cannula w/ humidification    Intake/Output - Last 3 Shifts       11/05 0700 - 11/06 0659 11/06 0700 - 11/07 0659 11/07 0700 - 11/08 0659    P.O.  900     I.V. (mL/kg) 5015.5 (43.1) 127.9 (1.1)     IV Piggyback 350 100     Total Intake(mL/kg) 5365.5 (46.1) 1127.9 (9.9)     Urine (mL/kg/hr) 1265 (0.5) 1170 (0.4)     Drains 15 0     Chest Tube 655      Total Output 1935 1170     Net +3430.5 -42.1            Stool Occurrence  1 x           Lines/Drains/Airways     Peripherally Inserted Central Catheter Line                 PICC Double Lumen 11/06/18 1319 right brachial 1 day                Physical Exam   Constitutional: He is oriented to person, place, and time. He appears well-developed and well-nourished. No distress.   HENT:   Head: Normocephalic and atraumatic.   Cardiovascular: Normal rate, regular rhythm and normal heart sounds.   Pulmonary/Chest: Effort normal and breath sounds normal.   Abdominal: Soft. Bowel sounds are normal.   Musculoskeletal: Normal range of motion. He exhibits no edema or deformity.   Neurological: He is alert and oriented to person, place, and time.   Skin: Skin is warm and dry. He is not diaphoretic.       Significant Labs:  All pertinent labs from the last 24 hours have been reviewed.    Significant Diagnostics:  I have reviewed all pertinent imaging results/findings within the past 24 hours.    Assessment/Plan:     * Left main coronary artery disease    The patient is a 73-year-old male who was worked up for complaints of shortness of breath.  Cardiac catheterization today shows significant left main coronary artery disease.  The patient is a candidate for coronary artery bypass grafting which will be scheduled for 11/05/2018.  The risks and benefits of surgery were explained to the patient. The patient understands and has agreed to proceed with surgery..     S/P CABG x 2    11/06/2018  The patient is  postop day 1 status post coronary artery bypass grafting x2 and status post aortotomy for excision of of atherosclerotic plaques.  Overall the patient is doing well. Transferred to telemetry unit.  Neuro: The patient is awake alert and oriented x3.  He moves all 4.  Pain is well controlled with pain meds.  Cardiac:  Patient is off all drips.  Blood pressure is controlled with metoprolol.  Will escalate metoprolol dose as he tolerates.  Respiratory:  Patient was extubated on postop day 0.  Good sats on nasal cannula.  GI:  Patient is tolerating p.o. intake.  Advanced as tolerated.  Renal:  Patient has good urine output. creatinine is 0.8.  Heme:  The patient has a hematocrit of 24.1.  Platelet count is 80.  Will hold of Plavix until platelet count is above 100.  Activities:  Patient is out of bed and ambulating.  Advance as tolerated.  Lines tubes and drains patient has a mid level line, and pacer wires.  Will discontinue Armenta.    11/07/2018  The patient is postop day 2 status post coronary artery bypass grafting x2 and status post aortotomy for excision of of atherosclerotic plaques.  Overall the patient is doing well. Anticipate discharge in the next 24-48 hours   Neuro: The patient is awake alert and oriented x3.  He moves all 4.  Pain is well controlled with pain meds.  Cardiac:  Blood pressure is controlled with metoprolol.  Will escalate metoprolol dose as he tolerates.  Respiratory:  Good sats on nasal cannula.  GI:  Patient is tolerating p.o. intake.  Advanced as tolerated.  Renal:  Patient has good urine output. creatinine is 1.0.  Heme:  The patient has a hematocrit of 24.1.  Platelet count is 109. Will restart Plavix.  Activities:  Patient is out of bed and ambulating.  Advance as tolerated.  Lines tubes and drains patient has a mid level line, and pacer wires.            Bear De uLna MD  Cardiothoracic Surgery  [facility] -

## 2018-11-07 NOTE — PT/OT/SLP PROGRESS
"Occupational Therapy  Treatment    Erendira Pretty   MRN: [id number]   Admitting Diagnosis: Left main coronary artery disease    OT Date of Treatment: 11/07/18   OT Start Time: 1048  OT Stop Time: 1100  OT Total Time (min): 12 min    Billable Minutes:  Therapeutic Activity 12 min    General Precautions: Standard, fall, sternal  Orthopedic Precautions: N/A  Braces: N/A         Subjective:  Communicated with Nurse nam and Epic chart review prior to session.  Pt found sitting in recliner chair and agreeable to tx at this time.  Pain/Comfort  Pain Rating 1: 2/10  Location 1: chest  Pain Addressed 1: Reposition, Distraction  Pain Rating Post-Intervention 1: 2/10    Objective:  Patient found with: peripheral IV, telemetry, oxygen, wound vac(external pacer)     Functional Mobility:  Bed Mobility: n/a       Transfers: CGA        Functional Ambulation: 210ft x 2 with CGA    Therapeutic Activities and Exercises:  Therapist reviewed sternal precautions with pt and pt able to recall 3/5. Therapist reviewed toilet hygiene and pt had good understanding. Pt performed scooting to edge of chair with CGA, sit>stand with CGA, functional mobility 210 ft x 2 with CGA and 3L O2, t/f to recliner with CGA.         AM-PAC 6 CLICK ADL   How much help from another person does this patient currently need?   1 = Unable, Total/Dependent Assistance  2 = A lot, Maximum/Moderate Assistance  3 = A little, Minimum/Contact Guard/Supervision  4 = None, Modified [place]/Independent    Putting on and taking off regular lower body clothing? : 2  Bathing (including washing, rinsing, drying)?: 2  Toileting, which includes using toilet, bedpan, or urinal? : 3  Putting on and taking off regular upper body clothing?: 3  Taking care of personal grooming such as brushing teeth?: 3  Eating meals?: 4  Daily Activity Total Score: 17     AM-PAC Raw Score CMS "G-Code Modifier Level of Impairment Assistance   6 % Total / Unable   7 - 8 CM 80 - 100% Maximal " Assist   9-13 CL 60 - 80% Moderate Assist   14 - 19 CK 40 - 60% Moderate Assist   20 - 22 CJ 20 - 40% Minimal Assist   23 CI 1-20% SBA / CGA   24 CH 0% Independent/ Mod I       Patient left up in chair with all lines intact, call button in reach, Nurse Niels notified and wife present    ASSESSMENT:  Erendira Pretty is a 73 y.o. male with a medical diagnosis of Left main coronary artery disease and presents with impaired functional mobility and ADL's.    Rehab identified problem list/impairments: Rehab identified problem list/impairments: weakness, impaired endurance, impaired self care skills, impaired balance, impaired coordination, decreased upper extremity function    Rehab potential is good.    Activity tolerance: Good    Discharge recommendations: Discharge Facility/Level Of Care Needs: home with home health     Barriers to discharge: Barriers to Discharge: None    Equipment recommendations: bath bench     GOALS:   Multidisciplinary Problems     Occupational Therapy Goals        Problem: Occupational Therapy Goal    Goal Priority Disciplines Outcome Interventions   Occupational Therapy Goal     OT, PT/OT Ongoing (interventions implemented as appropriate)    Description:  Goals to be met by: 11/13/18     Patient will increase functional independence with ADLs by performing:    UE Dressing with Minimal Assistance.  LE Dressing with Moderate Assistance.  Grooming while standing at sink with Contact Guard Assistance.  Toileting from toilet with Minimal Assistance for hygiene and clothing management.   Toilet transfer to toilet with Minimal Assistance.  Upper extremity exercise program x10 reps per handout, with supervision.                      Plan:  Patient to be seen 3 x/week to address the above listed problems via self-care/home management, therapeutic activities, therapeutic exercises  Plan of Care expires: 11/13/18  Plan of Care reviewed with: patient, spouse    OT G-codes  Functional Assessment Tool Used:  [facility]  Score: 17    Antoinette Estrada, PT/OT  11/07/2018

## 2018-11-07 NOTE — PLAN OF CARE
Problem: Occupational Therapy Goal  Goal: Occupational Therapy Goal  Goals to be met by: 11/13/18     Patient will increase functional independence with ADLs by performing:    UE Dressing with Minimal Assistance.  LE Dressing with Moderate Assistance.  Grooming while standing at sink with Contact Guard Assistance.  Toileting from toilet with Minimal Assistance for hygiene and clothing management.   Toilet transfer to toilet with Minimal Assistance.  Upper extremity exercise program x10 reps per handout, with supervision.     Outcome: Ongoing (interventions implemented as appropriate)  Pt performed sit<>stand t/f's with CGA, functional mobility 210ft x 2 with CGA

## 2018-11-07 NOTE — PT/OT/SLP PROGRESS
Physical Therapy      Patient Name:  Erendira Pretty   MRN:  [id number]    Patient not seen today secondary to  PATIENT REPORT , HE ' JUST WALKED'. Will follow-up WHEN APPROPRIATE. 11:10 KALYAN Mccoy PTA

## 2018-11-07 NOTE — ASSESSMENT & PLAN NOTE
11/06/2018  The patient is postop day 1 status post coronary artery bypass grafting x2 and status post aortotomy for excision of of atherosclerotic plaques.  Overall the patient is doing well. Transferred to telemetry unit.  Neuro: The patient is awake alert and oriented x3.  He moves all 4.  Pain is well controlled with pain meds.  Cardiac:  Patient is off all drips.  Blood pressure is controlled with metoprolol.  Will escalate metoprolol dose as he tolerates.  Respiratory:  Patient was extubated on postop day 0.  Good sats on nasal cannula.  GI:  Patient is tolerating p.o. intake.  Advanced as tolerated.  Renal:  Patient has good urine output. creatinine is 0.8.  Heme:  The patient has a hematocrit of 24.1.  Platelet count is 80.  Will hold of Plavix until platelet count is above 100.  Activities:  Patient is out of bed and ambulating.  Advance as tolerated.  Lines tubes and drains patient has a mid level line, and pacer wires.  Will discontinue Armenta.    11/07/2018  The patient is postop day 2 status post coronary artery bypass grafting x2 and status post aortotomy for excision of of atherosclerotic plaques.  Overall the patient is doing well. Anticipate discharge in the next 24-48 hours   Neuro: The patient is awake alert and oriented x3.  He moves all 4.  Pain is well controlled with pain meds.  Cardiac:  Blood pressure is controlled with metoprolol.  Will escalate metoprolol dose as he tolerates.  Respiratory:  Good sats on nasal cannula.  GI:  Patient is tolerating p.o. intake.  Advanced as tolerated.  Renal:  Patient has good urine output. creatinine is 1.0.  Heme:  The patient has a hematocrit of 24.1.  Platelet count is 109. Will restart Plavix.  Activities:  Patient is out of bed and ambulating.  Advance as tolerated.  Lines tubes and drains patient has a mid level line, and pacer wires.

## 2018-11-07 NOTE — PLAN OF CARE
Problem: Physical Therapy Goal  Goal: Physical Therapy Goal  LTG'S TO BE MET IN 7 DAYS (11-13-18)  1. PT WILL REQUIRE SBA FOR BED MOBILITY  2. PT WILL BE KANCHAN WITH TF'S  3. PT WILL ' KANCHAN  4. PT WILL DEMO G DYNAMIC BALANCE DURING GAIT   Outcome: Ongoing (interventions implemented as appropriate)  PATIENT DID WELL WITH GT IN HALLWAY, O2 AT 2L ALL TX , 150'X2 , GOOD STEADY PACE AT CGAX1.

## 2018-11-07 NOTE — PLAN OF CARE
Problem: Physical Therapy Goal  Goal: Physical Therapy Goal  LTG'S TO BE MET IN 7 DAYS (11-13-18)  1. PT WILL REQUIRE SBA FOR BED MOBILITY  2. PT WILL BE KANCHAN WITH TF'S  3. PT WILL ' KANCHAN  4. PT WILL DEMO G DYNAMIC BALANCE DURING GAIT   Outcome: Ongoing (interventions implemented as appropriate)  Pt performed t/f's with Min A, ambulated 420ft with CGA and 3L O2

## 2018-11-07 NOTE — ASSESSMENT & PLAN NOTE
R/LHC completed yesterday which revealed significant LM involving LCX, ostial and LAD-ostial, normal LV function, Mild AS with CVT consult for CABG  CVT consult for CABG on Monday, 11/5.   Continue ASA, Statin, BB, ARB  Denies chest pain or shortness of breath on exam.    11/5/18  -s/p CABG x 2 today  -Remains stable  -Resume ASA, statin, BB, ARB, Plavix when able    11/6/18  -POD # 1 s/p CABG x 2  -Remains stable CV wise  -Continue ASA, statin, BB, Plavix  -Resume ARB when able  -IS usage and ambulation    11/7/18  -POD # 2 s/p CABG x 2  -Stable CV wise  -Continue ASA, statin, BB, Plavix  -Resume ARB when able  -Needs to ambulate  -IS usage

## 2018-11-07 NOTE — PLAN OF CARE
Consult completed. CM discussed d/c planning with the patient and agreed with recommendations for HH. CM gave patient and spouse a listing of integrated partners for HH. Preference form signed for [facility]. Cm placed referral in [facility]. Cm to f/u for safe transition

## 2018-11-07 NOTE — PT/OT/SLP PROGRESS
Physical Therapy  Treatment    Erendira Pretty   MRN: [id number]   Admitting Diagnosis: Left main coronary artery disease    PT Received On: 11/07/18  PT Start Time: 1035     PT Stop Time: 1048    PT Total Time (min): 13 min       Billable Minutes:  Gait Training 13 min    Treatment Type: Treatment  PT/PTA: PT             General Precautions: Standard, fall, sternal  Orthopedic Precautions: N/A   Braces: N/A         Subjective:  Communicated with Nurse Bowser and Epic chart review prior to session.  Pt found sitting in recliner chair and agreeable to tx at this time.     Pain/Comfort  Pain Rating 1: 2/10  Location 1: chest  Pain Addressed 1: Reposition, Distraction  Pain Rating Post-Intervention 1: 2/10    Objective:   Patient found with: peripheral IV, wound vac, oxygen, telemetry(external pacer)    Functional Mobility:  Bed Mobility: N/A       Transfers:CGA       Gait: CGA 210ft x 2, No AD, 3L O2       Balance:   Static Sit: GOOD-: Takes MODERATE challenges from all directions but inconsistently  Dynamic Sit: GOOD-: Incosistently Maintains balance through MODERATE excursions of active trunk movement,     Static Stand: FAIR: Maintains without assist but unable to take challenges  Dynamic stand: FAIR: Needs CONTACT GUARD during gait     Therapeutic Activities and Exercises:  Therapist reviewed sternal precautions with pt and pt able to recall 3/5. Pt performed scooting to edge of chair with CGA, sit>stand with CGA, ambulation 210 ft x 2 with CGA and 3L O2, t/f to recliner with CGA.     AM-PAC 6 CLICK MOBILITY  How much help from another person does this patient currently need?   1 = Unable, Total/Dependent Assistance  2 = A lot, Maximum/Moderate Assistance  3 = A little, Minimum/Contact Guard/Supervision  4 = None, Modified [place]/Independent    Turning over in bed (including adjusting bedclothes, sheets and blankets)?: 2  Sitting down on and standing up from a chair with arms (e.g., wheelchair, bedside commode,  etc.): 3  Moving from lying on back to sitting on the side of the bed?: 2  Moving to and from a bed to a chair (including a wheelchair)?: 3  Need to walk in hospital room?: 3  Climbing 3-5 steps with a railing?: 1  Basic Mobility Total Score: 14    AM-PAC Raw Score CMS G-Code Modifier Level of Impairment Assistance   6 % Total / Unable   7 - 9 CM 80 - 100% Maximal Assist   10 - 14 CL 60 - 80% Moderate Assist   15 - 19 CK 40 - 60% Moderate Assist   20 - 22 CJ 20 - 40% Minimal Assist   23 CI 1-20% SBA / CGA   24 CH 0% Independent/ Mod I     Patient left up in chair with Nurse Niels notified and Wife present.    Assessment:  Erendira Pretty is a 73 y.o. male with a medical diagnosis of Left main coronary artery disease and presents with impaired t/f's and gait instability.    Rehab identified problem list/impairments: Rehab identified problem list/impairments: weakness, impaired endurance, impaired balance, impaired coordination, impaired functional mobilty, gait instability    Rehab potential is good.    Activity tolerance: Good    Discharge recommendations: Discharge Facility/Level Of Care Needs: home with home health     Barriers to discharge:      Equipment recommendations: Equipment Needed After Discharge: bath bench     GOALS:   Multidisciplinary Problems     Physical Therapy Goals        Problem: Physical Therapy Goal    Goal Priority Disciplines Outcome Goal Variances Interventions   Physical Therapy Goal     PT, PT/OT Ongoing (interventions implemented as appropriate)     Description:  LTG'S TO BE MET IN 7 DAYS (11-13-18)  1. PT WILL REQUIRE SBA FOR BED MOBILITY  2. PT WILL BE KANCHAN WITH TF'S  3. PT WILL ' KANCHAN  4. PT WILL DEMO G DYNAMIC BALANCE DURING GAIT                    PLAN:    Patient to be seen 5 x/week  to address the above listed problems via gait training, therapeutic activities, therapeutic exercises  Plan of Care expires: 11/13/18  Plan of Care reviewed with: patient, spouse    PT  G-Codes  [facility]  Score: 14    Antoinette Estrada, PT/OT  11/07/2018

## 2018-11-07 NOTE — PROGRESS NOTES
[facility] -   Cardiology  Progress Note    Patient Name: Erendira Pretty  MRN: [id number]  Admission Date: 11/2/2018  Hospital Length of Stay: 2 days  Code Status: Full Code   Attending Physician: Bear De Luna MD   Primary Care Physician: Cortes Roblero MD  Expected Discharge Date:   Principal Problem:Left main coronary artery disease    Subjective:   HPI:  Mr. Pretty is a 73 year old male who presented to [facility] due to LHC per Dr. Gallardo. R/LHC revealed significant LM involving LCX, ostial and LAD-ostial, normal LV function, Mild AS and CVT consulted. CVT recommended CABG on Monday, 11/5. Patient placed in outpatient recovery awaiting CABG for Monday.     Hospital Course:   11/3/18-Patient seen and examined in room, lying in bed. Denies chest pain or shortness of breath today.     11/4/18-Patient seen and examined in room, lying in bed. Denies chest pan or shortness of breath today. Labs reviewed, K+ 3.6, Cr 1.0. Pending CABG in AM.     11/5/18-Patient seen and examined, s/p CABG x 2 this AM. Remains stable CV wise.     11/6/18-Patient seen and examined today, POD #1 s/p CABG x 2. Sitting up in chair. Feels ok, complains of incisional pain. Labs stable.     11/7/18-Patient seen and examined today, POD # 2 s/p CABG x 2. Resting in bed. No complaints. Denies chest pain or SOB. Remains in SR. Labs stable.         Review of Systems   Constitution: Negative.   HENT: Negative.    Eyes: Negative.    Cardiovascular: Negative.    Respiratory: Negative.    Endocrine: Negative.    Hematologic/Lymphatic: Negative.    Skin: Negative.    Musculoskeletal: Negative.    Gastrointestinal: Negative.    Genitourinary: Negative.    Neurological: Negative.    Psychiatric/Behavioral: Negative.    Allergic/Immunologic: Negative.      Objective:     Vital Signs (Most Recent):  Temp: 97.3 °F (36.3 °C) (11/07/18 0720)  Pulse: 92 (11/07/18 0836)  Resp: 14 (11/07/18 0836)  BP: 128/72 (11/07/18 0720)  SpO2: 96 % (11/07/18  0836) Vital Signs (24h Range):  Temp:  [97.3 °F (36.3 °C)-99.6 °F (37.6 °C)] 97.3 °F (36.3 °C)  Pulse:  [83-92] 92  Resp:  [13-26] 14  SpO2:  [92 %-97 %] 96 %  BP: (102-157)/(59-72) 128/72     Weight: 114.5 kg (252 lb 6.8 oz)  Body mass index is 38.38 kg/m².     SpO2: 96 %  O2 Device (Oxygen Therapy): nasal cannula w/ humidification      Intake/Output Summary (Last 24 hours) at 11/7/2018 1108  Last data filed at 11/6/2018 1800  Gross per 24 hour   Intake 400 ml   Output 305 ml   Net 95 ml       Lines/Drains/Airways     Peripherally Inserted Central Catheter Line                 PICC Double Lumen 11/06/18 1319 right brachial less than 1 day          Drain                 Closed/Suction Drain 11/05/18 1302 Anterior Chest Other (Comment) 1 day                Physical Exam   Constitutional: He is oriented to person, place, and time. He appears well-developed and well-nourished. No distress.   HENT:   Head: Normocephalic and atraumatic.   Eyes: Pupils are equal, round, and reactive to light.   Neck: Neck supple. No JVD present. No thyromegaly present.   Cardiovascular: Normal rate, regular rhythm, S1 normal, S2 normal, normal heart sounds and intact distal pulses. Exam reveals no friction rub.   No murmur heard.  Pulmonary/Chest: Effort normal.   Diminished BS at bases   Abdominal: Soft. He exhibits no distension.   Musculoskeletal: He exhibits edema (trace BLE).   Neurological: He is alert and oriented to person, place, and time.   Skin: Skin is warm and dry. He is not diaphoretic. No erythema.   Psychiatric: He has a normal mood and affect. His behavior is normal. Thought content normal.   Nursing note and vitals reviewed.      Significant Labs:   CMP   Recent Labs   Lab 11/06/18  0500 11/06/18  1637 11/07/18  0554    137 135*   K 3.6 4.2 4.2   * 103 99   CO2 22* 26 26    168* 207*   BUN 12 13 16   CREATININE 0.8 1.0 1.0   CALCIUM 7.9* 8.5* 8.3*   ANIONGAP 6* 8 10   ESTGFRAFRICA >60 >60 >60    EGFRNONAA >60 >60 >60   , CBC   Recent Labs   Lab 11/05/18  2104 11/06/18  0500 11/07/18  0554   WBC 9.52 9.12  9.12 12.10   HGB 9.6* 8.6*  8.6* 9.4*   HCT 27.1* 24.1*  24.1* 27.0*   * 80*  80* 109*   , Troponin No results for input(s): TROPONINI in the last 48 hours. and All pertinent lab results from the last 24 hours have been reviewed.    Significant Imaging: Echocardiogram:   2D echo with color flow doppler:   Results for orders placed or performed in visit on 10/25/18   2D echo with color flow doppler   Result Value Ref Range    Calculated EF 55 55 - 65    Diastolic Dysfunction No     Aortic Valve Stenosis MILD TO MODERATE (A)     Est. PA Systolic Pressure 27.04     Mitral Valve Mobility NORMAL     Tricuspid Valve Regurgitation MILD     and X-Ray: CXR: X-Ray Chest 1 View (CXR): No results found for this visit on 11/02/18. and X-Ray Chest PA and Lateral (CXR):   Results for orders placed or performed during the hospital encounter of 11/02/18   X-Ray Chest PA And Lateral    Narrative    EXAMINATION:  XR CHEST PA AND LATERAL    CLINICAL HISTORY:  Pre Op;    TECHNIQUE:  Single frontal view of the chest was performed.    COMPARISON:  None    FINDINGS:  The lungs are clear, with normal appearance of pulmonary vasculature and no pleural effusion or pneumothorax.    The cardiac silhouette is normal in size. The hilar and mediastinal contours are unremarkable.    Bones are intact.      Impression    No acute abnormality.      Electronically signed by: Vincent Gonzalez Jr., MD  Date:    11/02/2018  Time:    22:59     Assessment and Plan:   Patient who presents with critical LM disease s/p CABG x 2. Recovering well. Continue same mgmt. Ambulation and IS usage.    * Left main coronary artery disease    R/LHC completed yesterday which revealed significant LM involving LCX, ostial and LAD-ostial, normal LV function, Mild AS with CVT consult for CABG  CVT consult for CABG on Monday, 11/5.   Continue ASA, Statin, BB,  ARB  Denies chest pain or shortness of breath on exam.    11/5/18  -s/p CABG x 2 today  -Remains stable  -Resume ASA, statin, BB, ARB, Plavix when able    11/6/18  -POD # 1 s/p CABG x 2  -Remains stable CV wise  -Continue ASA, statin, BB, Plavix  -Resume ARB when able  -IS usage and ambulation    11/7/18  -POD # 2 s/p CABG x 2  -Stable CV wise  -Continue ASA, statin, BB, Plavix  -Resume ARB when able  -Needs to ambulate  -IS usage     Mixed hyperlipidemia    -Continue statin and niacin     Aortic stenosis mild    -Mild AS per [facility]   -Stable     Obesity, unspecified    -Encourage weight loss     Essential hypertension    -Continue BB  -Resume ARB once able         VTE Risk Mitigation (From admission, onward)        Ordered     IP VTE LOW RISK PATIENT  Once      11/02/18 1800     Place DHEERAJ hose  Until discontinued      11/02/18 1800     Place sequential compression device  Until discontinued      11/02/18 1800          Aster Petty PA-C  Cardiology  [facility] -     Chart reviewed. Dr. Wang examined patient and agrees with plan as outlined above.

## 2018-11-07 NOTE — PHYSICIAN QUERY
PT Name: Erendira Pretty  MR #: [id number]    Physician Query Form - CardioPulmonary Clarification      CDS/: Porsche Rivera               Contact information: [email or web address]      This form is a permanent document in the medical record.    Query Date: November 7, 2018    By submitting this query, we are merely seeking further clarification of documentation. Please utilize your independent clinical judgment when addressing the question(s) below.    The Medical record contains the following:   Indicators   Supporting Clinical Findings Location in Medical Record   x Pulmonary Hypertension documented Mild Pulmonary Hypertension Cath Lab procedure note 11/2    x Acute/Chronic Illness   * Left main coronary artery disease    Aortic stenosis mild   Cards Progress note 11/7    x Echo and/or Heart Cath Findings B. Summary/Post-Operative Diagnosis      1.   Left Main disease.   2.   Normal LVEF.   3.   Mildly elevated left Filling Pressures.   4.   Mild Pulmonary Hypertension. Cath Lab procedure note 11/2    BiPAP/Intubation/Supplemental O2      SOB, MARCUS, Fatigue, Dizziness, LE Edema, Cyanosis, Chest Pain, Respiratory Distress, Hypoxia, etc.      Treatment         Medication      Other     Provider, please specify the type of pulmonary hypertension:    [ x  ]  Group 2:  Pulmonary Hypertension due to Left Heart Disease, including left heart failure and/or left heart valve disease    [   ]  Group 3:  Pulmonary Hypertension due to Lung Disease    [   ]  Pulmonary Hypertension, unspecified    [   ]  Other Cardiopulmonary Condition (please specify):  _____________________________________    [   ]  Clinically Undetermined    Please document in your progress notes daily for the duration of treatment, until resolved, and include in your discharge summary.

## 2018-11-07 NOTE — PLAN OF CARE
Problem: Patient Care Overview  Goal: Plan of Care Review  Outcome: Ongoing (interventions implemented as appropriate)  The patient has been paced on the monitor. Blood glucose monitored. Wound vac clean, dry, intact on midsternal incision. L leg incision clean, dry, intact. Pt has had an uneventful night and is resting quietly, will continue to monitor.

## 2018-11-07 NOTE — SUBJECTIVE & OBJECTIVE
Review of Systems   Constitution: Negative.   HENT: Negative.    Eyes: Negative.    Cardiovascular: Negative.    Respiratory: Negative.    Endocrine: Negative.    Hematologic/Lymphatic: Negative.    Skin: Negative.    Musculoskeletal: Negative.    Gastrointestinal: Negative.    Genitourinary: Negative.    Neurological: Negative.    Psychiatric/Behavioral: Negative.    Allergic/Immunologic: Negative.      Objective:     Vital Signs (Most Recent):  Temp: 97.3 °F (36.3 °C) (11/07/18 0720)  Pulse: 92 (11/07/18 0836)  Resp: 14 (11/07/18 0836)  BP: 128/72 (11/07/18 0720)  SpO2: 96 % (11/07/18 0836) Vital Signs (24h Range):  Temp:  [97.3 °F (36.3 °C)-99.6 °F (37.6 °C)] 97.3 °F (36.3 °C)  Pulse:  [83-92] 92  Resp:  [13-26] 14  SpO2:  [92 %-97 %] 96 %  BP: (102-157)/(59-72) 128/72     Weight: 114.5 kg (252 lb 6.8 oz)  Body mass index is 38.38 kg/m².     SpO2: 96 %  O2 Device (Oxygen Therapy): nasal cannula w/ humidification      Intake/Output Summary (Last 24 hours) at 11/7/2018 1108  Last data filed at 11/6/2018 1800  Gross per 24 hour   Intake 400 ml   Output 305 ml   Net 95 ml       Lines/Drains/Airways     Peripherally Inserted Central Catheter Line                 PICC Double Lumen 11/06/18 1319 right brachial less than 1 day          Drain                 Closed/Suction Drain 11/05/18 1302 Anterior Chest Other (Comment) 1 day                Physical Exam   Constitutional: He is oriented to person, place, and time. He appears well-developed and well-nourished. No distress.   HENT:   Head: Normocephalic and atraumatic.   Eyes: Pupils are equal, round, and reactive to light.   Neck: Neck supple. No JVD present. No thyromegaly present.   Cardiovascular: Normal rate, regular rhythm, S1 normal, S2 normal, normal heart sounds and intact distal pulses. Exam reveals no friction rub.   No murmur heard.  Pulmonary/Chest: Effort normal.   Diminished BS at bases   Abdominal: Soft. He exhibits no distension.   Musculoskeletal:  He exhibits edema (trace BLE).   Neurological: He is alert and oriented to person, place, and time.   Skin: Skin is warm and dry. He is not diaphoretic. No erythema.   Psychiatric: He has a normal mood and affect. His behavior is normal. Thought content normal.   Nursing note and vitals reviewed.      Significant Labs:   CMP   Recent Labs   Lab 11/06/18  0500 11/06/18  1637 11/07/18  0554    137 135*   K 3.6 4.2 4.2   * 103 99   CO2 22* 26 26    168* 207*   BUN 12 13 16   CREATININE 0.8 1.0 1.0   CALCIUM 7.9* 8.5* 8.3*   ANIONGAP 6* 8 10   ESTGFRAFRICA >60 >60 >60   EGFRNONAA >60 >60 >60   , CBC   Recent Labs   Lab 11/05/18  2104 11/06/18  0500 11/07/18  0554   WBC 9.52 9.12  9.12 12.10   HGB 9.6* 8.6*  8.6* 9.4*   HCT 27.1* 24.1*  24.1* 27.0*   * 80*  80* 109*   , Troponin No results for input(s): TROPONINI in the last 48 hours. and All pertinent lab results from the last 24 hours have been reviewed.    Significant Imaging: Echocardiogram:   2D echo with color flow doppler:   Results for orders placed or performed in visit on 10/25/18   2D echo with color flow doppler   Result Value Ref Range    Calculated EF 55 55 - 65    Diastolic Dysfunction No     Aortic Valve Stenosis MILD TO MODERATE (A)     Est. PA Systolic Pressure 27.04     Mitral Valve Mobility NORMAL     Tricuspid Valve Regurgitation MILD     and X-Ray: CXR: X-Ray Chest 1 View (CXR): No results found for this visit on 11/02/18. and X-Ray Chest PA and Lateral (CXR):   Results for orders placed or performed during the hospital encounter of 11/02/18   X-Ray Chest PA And Lateral    Narrative    EXAMINATION:  XR CHEST PA AND LATERAL    CLINICAL HISTORY:  Pre Op;    TECHNIQUE:  Single frontal view of the chest was performed.    COMPARISON:  None    FINDINGS:  The lungs are clear, with normal appearance of pulmonary vasculature and no pleural effusion or pneumothorax.    The cardiac silhouette is normal in size. The hilar and  mediastinal contours are unremarkable.    Bones are intact.      Impression    No acute abnormality.      Electronically signed by: Vincent Gonzalez Jr., MD  Date:    11/02/2018  Time:    22:59

## 2018-11-07 NOTE — PLAN OF CARE
Problem: Patient Care Overview  Goal: Plan of Care Review  Outcome: Ongoing (interventions implemented as appropriate)  Plan of care reviewed with patient. No concerns expressed. Information given about heart rate and need for epicardial pacer. Patient ambulation with steady footing with standby assistance with minimal shortness of breath. VSS, afebrile and remains paced on monitor. Will continue to monitor for any changes.

## 2018-11-08 VITALS
RESPIRATION RATE: 18 BRPM | OXYGEN SATURATION: 94 % | HEIGHT: 68 IN | WEIGHT: 252.63 LBS | TEMPERATURE: 99 F | SYSTOLIC BLOOD PRESSURE: 107 MMHG | BODY MASS INDEX: 38.29 KG/M2 | DIASTOLIC BLOOD PRESSURE: 54 MMHG | HEART RATE: 83 BPM

## 2018-11-08 LAB
ANION GAP SERPL CALC-SCNC: 8 MMOL/L
BASOPHILS # BLD AUTO: 0.01 K/UL
BASOPHILS NFR BLD: 0.1 %
BLD PROD TYP BPU: NORMAL
BLOOD UNIT EXPIRATION DATE: NORMAL
BLOOD UNIT TYPE CODE: 5100
BLOOD UNIT TYPE: NORMAL
BUN SERPL-MCNC: 13 MG/DL
CALCIUM SERPL-MCNC: 7.6 MG/DL
CHLORIDE SERPL-SCNC: 102 MMOL/L
CO2 SERPL-SCNC: 25 MMOL/L
CODING SYSTEM: NORMAL
CREAT SERPL-MCNC: 0.8 MG/DL
DIFFERENTIAL METHOD: ABNORMAL
DISPENSE STATUS: NORMAL
EOSINOPHIL # BLD AUTO: 0.1 K/UL
EOSINOPHIL NFR BLD: 0.6 %
ERYTHROCYTE [DISTWIDTH] IN BLOOD BY AUTOMATED COUNT: 13.4 %
EST. GFR  (AFRICAN AMERICAN): >60 ML/MIN/1.73 M^2
EST. GFR  (NON AFRICAN AMERICAN): >60 ML/MIN/1.73 M^2
GLUCOSE SERPL-MCNC: 130 MG/DL
HCT VFR BLD AUTO: 22.7 %
HGB BLD-MCNC: 7.9 G/DL
LYMPHOCYTES # BLD AUTO: 1.6 K/UL
LYMPHOCYTES NFR BLD: 17.6 %
MAGNESIUM SERPL-MCNC: 2.1 MG/DL
MCH RBC QN AUTO: 33.6 PG
MCHC RBC AUTO-ENTMCNC: 34.8 G/DL
MCV RBC AUTO: 97 FL
MONOCYTES # BLD AUTO: 1.2 K/UL
MONOCYTES NFR BLD: 12.7 %
NEUTROPHILS # BLD AUTO: 6.4 K/UL
NEUTROPHILS NFR BLD: 69 %
NUM UNITS TRANS PACKED RBC: NORMAL
PLATELET # BLD AUTO: 99 K/UL
PMV BLD AUTO: 9.3 FL
POCT GLUCOSE: 140 MG/DL (ref 70–110)
POCT GLUCOSE: 174 MG/DL (ref 70–110)
POTASSIUM SERPL-SCNC: 3.6 MMOL/L
RBC # BLD AUTO: 2.35 M/UL
SODIUM SERPL-SCNC: 135 MMOL/L
WBC # BLD AUTO: 9.24 K/UL

## 2018-11-08 PROCEDURE — 97803 MED NUTRITION INDIV SUBSEQ: CPT

## 2018-11-08 PROCEDURE — 25000242 PHARM REV CODE 250 ALT 637 W/ HCPCS: Performed by: INTERNAL MEDICINE

## 2018-11-08 PROCEDURE — 63600175 PHARM REV CODE 636 W HCPCS: Performed by: THORACIC SURGERY (CARDIOTHORACIC VASCULAR SURGERY)

## 2018-11-08 PROCEDURE — 99900035 HC TECH TIME PER 15 MIN (STAT)

## 2018-11-08 PROCEDURE — 94640 AIRWAY INHALATION TREATMENT: CPT

## 2018-11-08 PROCEDURE — 85025 COMPLETE CBC W/AUTO DIFF WBC: CPT

## 2018-11-08 PROCEDURE — 80048 BASIC METABOLIC PNL TOTAL CA: CPT

## 2018-11-08 PROCEDURE — 97530 THERAPEUTIC ACTIVITIES: CPT

## 2018-11-08 PROCEDURE — 97116 GAIT TRAINING THERAPY: CPT

## 2018-11-08 PROCEDURE — 83735 ASSAY OF MAGNESIUM: CPT

## 2018-11-08 PROCEDURE — 97110 THERAPEUTIC EXERCISES: CPT

## 2018-11-08 PROCEDURE — 94761 N-INVAS EAR/PLS OXIMETRY MLT: CPT

## 2018-11-08 PROCEDURE — 25000003 PHARM REV CODE 250: Performed by: THORACIC SURGERY (CARDIOTHORACIC VASCULAR SURGERY)

## 2018-11-08 PROCEDURE — 99233 SBSQ HOSP IP/OBS HIGH 50: CPT | Mod: ,,, | Performed by: INTERNAL MEDICINE

## 2018-11-08 RX ORDER — OXYCODONE HYDROCHLORIDE 5 MG/1
5 TABLET ORAL EVERY 4 HOURS PRN
Qty: 5 TABLET | Refills: 0 | Status: SHIPPED | OUTPATIENT
Start: 2018-11-08 | End: 2018-11-12

## 2018-11-08 RX ORDER — POTASSIUM CHLORIDE 20 MEQ/1
20 TABLET, EXTENDED RELEASE ORAL DAILY
Qty: 28 TABLET | Refills: 0 | Status: SHIPPED | OUTPATIENT
Start: 2018-11-08 | End: 2018-12-06

## 2018-11-08 RX ORDER — METOPROLOL SUCCINATE 50 MG/1
50 TABLET, EXTENDED RELEASE ORAL DAILY
Qty: 60 TABLET | Refills: 11 | Status: SHIPPED | OUTPATIENT
Start: 2018-11-08 | End: 2019-01-02

## 2018-11-08 RX ORDER — CLOPIDOGREL BISULFATE 75 MG/1
75 TABLET ORAL DAILY
Qty: 30 TABLET | Refills: 11 | Status: SHIPPED | OUTPATIENT
Start: 2018-11-09 | End: 2019-05-09

## 2018-11-08 RX ORDER — ASPIRIN 81 MG/1
81 TABLET ORAL DAILY
Refills: 0 | COMMUNITY
Start: 2018-11-09 | End: 2019-09-17

## 2018-11-08 RX ORDER — FUROSEMIDE 40 MG/1
40 TABLET ORAL DAILY
Qty: 14 TABLET | Refills: 0 | Status: SHIPPED | OUTPATIENT
Start: 2018-11-08 | End: 2018-11-14 | Stop reason: SDUPTHER

## 2018-11-08 RX ORDER — POLYETHYLENE GLYCOL 3350 17 G/17G
17 POWDER, FOR SOLUTION ORAL DAILY
Qty: 527 G | Refills: 0 | Status: SHIPPED | OUTPATIENT
Start: 2018-11-09 | End: 2020-03-09

## 2018-11-08 RX ADMIN — ASPIRIN 81 MG: 81 TABLET, COATED ORAL at 09:11

## 2018-11-08 RX ADMIN — ARFORMOTEROL TARTRATE 15 MCG: 15 SOLUTION RESPIRATORY (INHALATION) at 08:11

## 2018-11-08 RX ADMIN — FAMOTIDINE 20 MG: 20 TABLET ORAL at 09:11

## 2018-11-08 RX ADMIN — CHLORHEXIDINE GLUCONATE 10 ML: 1.2 RINSE ORAL at 09:11

## 2018-11-08 RX ADMIN — FUROSEMIDE 40 MG: 10 INJECTION, SOLUTION INTRAMUSCULAR; INTRAVENOUS at 09:11

## 2018-11-08 RX ADMIN — BUDESONIDE 0.5 MG: 0.5 SUSPENSION RESPIRATORY (INHALATION) at 08:11

## 2018-11-08 RX ADMIN — CLOPIDOGREL BISULFATE 75 MG: 75 TABLET ORAL at 09:11

## 2018-11-08 RX ADMIN — DOCUSATE SODIUM 100 MG: 100 CAPSULE, LIQUID FILLED ORAL at 09:11

## 2018-11-08 RX ADMIN — METOPROLOL TARTRATE 100 MG: 50 TABLET ORAL at 09:11

## 2018-11-08 RX ADMIN — POTASSIUM CHLORIDE 20 MEQ: 1500 TABLET, EXTENDED RELEASE ORAL at 09:11

## 2018-11-08 NOTE — PT/OT/SLP PROGRESS
Occupational Therapy  Treatment    Erendira Pretty   MRN: [id number]   Admitting Diagnosis: Left main coronary artery disease    OT Date of Treatment: 11/08/18   OT Start Time: 1130  OT Stop Time: 1155  OT Total Time (min): 25 min    Billable Minutes:  Therapeutic Activity 25 minutes    General Precautions: Standard, sternal  Orthopedic Precautions: N/A  Braces: N/A         Subjective:  Communicated with nurse and epic chart review prior to session.    Pain/Comfort  Pain Rating 1: 0/10    Objective:  Patient found with: telemetry     Functional Mobility:  Bed Mobility:       Transfers:        Functional Ambulation: pt ambulated 200 feet with sba slow pace holding pillow    Activities of Daily Living:     Feeding adaptive equipment:  na  UE adaptive equipment: mod a   LE adaptive equipmen na                 Bathing adaptive equipment: na  Balance:   Static Sit: GOOD: Takes MODERATE challenges from all directions  Dynamic Sit: GOOD-: Incosistently Maintains balance through MODERATE excursions of active trunk movement,     Static Stand: FAIR+: Takes MINIMAL challenges from all directions  Dynamic stand: FAIR+: Needs CLOSE SUPERVISION during gait and is able to right self with minor LOB    Therapeutic Activities and Exercises:  Pt seen in hallway walking 200 feet with sba.. Pt/ spouse educated on ue dressing. Pt req mod a ue dressing with max verbal and tactile cues .Spouse reported will assist pt in ue/le dressing. Pt refused le dressing activity. Pt left sitting in bed side chair with all needs met and call button in reach and holding pillow. Spouse present  AM-PAC 6 CLICK ADL   How much help from another person does this patient currently need?   1 = Unable, Total/Dependent Assistance  2 = A lot, Maximum/Moderate Assistance  3 = A little, Minimum/Contact Guard/Supervision  4 = None, Modified Early/Independent    Putting on and taking off regular lower body clothing? : 2  Bathing (including washing, rinsing,  "drying)?: 2  Toileting, which includes using toilet, bedpan, or urinal? : 3  Putting on and taking off regular upper body clothing?: 3  Taking care of personal grooming such as brushing teeth?: 3  Eating meals?: 4  Daily Activity Total Score: 17     AM-PAC Raw Score CMS "G-Code Modifier Level of Impairment Assistance   6 % Total / Unable   7 - 8 CM 80 - 100% Maximal Assist   9-13 CL 60 - 80% Moderate Assist   14 - 19 CK 40 - 60% Moderate Assist   20 - 22 CJ 20 - 40% Minimal Assist   23 CI 1-20% SBA / CGA   24 CH 0% Independent/ Mod I       Patient left up in chair with all lines intact, call button in reach, nurse notified and spouse present    ASSESSMENT:  Erendira Pretty is a 73 y.o. male with a medical diagnosis of Left main coronary artery disease and presents with debility and generalized weakness. Pt will continue to benefit from skilled O.T..    Rehab identified problem list/impairments: Rehab identified problem list/impairments: weakness, impaired self care skills, impaired balance, decreased safety awareness, decreased ROM, impaired endurance, impaired functional mobilty, gait instability    Rehab potential is good.    Activity tolerance: Good    Discharge recommendations: Discharge Facility/Level Of Care Needs: home health PT     Barriers to discharge: Barriers to Discharge: None    Equipment recommendations: bath bench     GOALS:   Multidisciplinary Problems     Occupational Therapy Goals     Not on file          Multidisciplinary Problems (Resolved)        Problem: Occupational Therapy Goal    Goal Priority Disciplines Outcome Interventions   Occupational Therapy Goal   (Resolved)     OT, PT/OT Outcome(s) achieved    Description:  Goals to be met by: 11/13/18     Patient will increase functional independence with ADLs by performing:    UE Dressing with Minimal Assistance.  LE Dressing with Moderate Assistance.  Grooming while standing at sink with Contact Guard Assistance.  Toileting from toilet " with Minimal Assistance for hygiene and clothing management.   Toilet transfer to toilet with Minimal Assistance.  Upper extremity exercise program x10 reps per handout, with supervision.                      Plan:  Patient to be seen 3 x/week to address the above listed problems via self-care/home management, therapeutic activities, therapeutic exercises  Plan of Care expires: 11/13/18  Plan of Care reviewed with: patient, spouse         Glenda Nuñez, OT  11/08/2018

## 2018-11-08 NOTE — NURSING
Discharge information provided to patient and reviewed. No concerns expressed. Right picc removed with cath tip intact, dressing with tape applied once hemostasis obtained. Patient wheeled to private vehicle per wheelchair. Instructions car surgical wound care provided.

## 2018-11-08 NOTE — SUBJECTIVE & OBJECTIVE
Review of Systems   Constitution: Negative.   HENT: Negative.    Eyes: Negative.    Cardiovascular: Negative.    Respiratory: Negative.    Endocrine: Negative.    Hematologic/Lymphatic: Negative.    Skin: Negative.    Musculoskeletal: Negative.    Gastrointestinal: Negative.    Genitourinary: Negative.    Neurological: Negative.    Psychiatric/Behavioral: Negative.    Allergic/Immunologic: Negative.      Objective:     Vital Signs (Most Recent):  Temp: 97.6 °F (36.4 °C) (11/08/18 0725)  Pulse: 80 (11/08/18 1100)  Resp: 18 (11/08/18 0826)  BP: 110/60 (11/08/18 0725)  SpO2: (!) 94 % (11/08/18 0826) Vital Signs (24h Range):  Temp:  [97.2 °F (36.2 °C)-98.8 °F (37.1 °C)] 97.6 °F (36.4 °C)  Pulse:  [80-97] 80  Resp:  [18-20] 18  SpO2:  [92 %-95 %] 94 %  BP: (105-119)/(59-78) 110/60     Weight: 114.6 kg (252 lb 10.4 oz)  Body mass index is 38.41 kg/m².     SpO2: (!) 94 %  O2 Device (Oxygen Therapy): room air      Intake/Output Summary (Last 24 hours) at 11/8/2018 1203  Last data filed at 11/8/2018 0600  Gross per 24 hour   Intake --   Output 1600 ml   Net -1600 ml       Lines/Drains/Airways     Peripherally Inserted Central Catheter Line                 PICC Double Lumen 11/06/18 1319 right brachial 1 day                Physical Exam   Constitutional: He is oriented to person, place, and time. He appears well-developed and well-nourished. No distress.   HENT:   Head: Normocephalic and atraumatic.   Eyes: Pupils are equal, round, and reactive to light. Right eye exhibits no discharge. Left eye exhibits no discharge.   Neck: Neck supple. No JVD present. No thyromegaly present.   Cardiovascular: Normal rate, regular rhythm, S1 normal, S2 normal and normal heart sounds.   No murmur heard.  Pulmonary/Chest: Effort normal.   CABG site C/D/I; no bleeding, erythema, or drainage    Diminished BS at bases   Abdominal: Soft. He exhibits no distension. There is no tenderness. There is no rebound.   Musculoskeletal: He exhibits no  edema.   Neurological: He is alert and oriented to person, place, and time.   Skin: Skin is warm and dry. He is not diaphoretic. No erythema.   Psychiatric: He has a normal mood and affect. His behavior is normal. Thought content normal.   Nursing note and vitals reviewed.      Significant Labs:   CMP   Recent Labs   Lab 11/07/18  0554 11/07/18 2021 11/08/18  0432   * 132* 135*   K 4.2 3.7 3.6   CL 99 96 102   CO2 26 26 25   * 178* 130*   BUN 16 15 13   CREATININE 1.0 0.8 0.8   CALCIUM 8.3* 8.1* 7.6*   ANIONGAP 10 10 8   ESTGFRAFRICA >60 >60 >60   EGFRNONAA >60 >60 >60   , CBC   Recent Labs   Lab 11/07/18  0554 11/08/18 0432   WBC 12.10 9.24   HGB 9.4* 7.9*   HCT 27.0* 22.7*   * 99*   , Troponin No results for input(s): TROPONINI in the last 48 hours. and All pertinent lab results from the last 24 hours have been reviewed.    Significant Imaging: Echocardiogram:   2D echo with color flow doppler:   Results for orders placed or performed in visit on 10/25/18   2D echo with color flow doppler   Result Value Ref Range    Calculated EF 55 55 - 65    Diastolic Dysfunction No     Aortic Valve Stenosis MILD TO MODERATE (A)     Est. PA Systolic Pressure 27.04     Mitral Valve Mobility NORMAL     Tricuspid Valve Regurgitation MILD     and X-Ray: CXR: X-Ray Chest 1 View (CXR): No results found for this visit on 11/02/18. and X-Ray Chest PA and Lateral (CXR):   Results for orders placed or performed during the hospital encounter of 11/02/18   X-Ray Chest PA And Lateral    Narrative    EXAMINATION:  XR CHEST PA AND LATERAL    CLINICAL HISTORY:  Pre Op;    TECHNIQUE:  Single frontal view of the chest was performed.    COMPARISON:  None    FINDINGS:  The lungs are clear, with normal appearance of pulmonary vasculature and no pleural effusion or pneumothorax.    The cardiac silhouette is normal in size. The hilar and mediastinal contours are unremarkable.    Bones are intact.      Impression    No acute  abnormality.      Electronically signed by: Vincent Gonzalez Jr., MD  Date:    11/02/2018  Time:    22:59

## 2018-11-08 NOTE — PROGRESS NOTES
[facility] -   Adult Nutrition  Progress Note    SUMMARY     Recommendations    Recommendation/Intervention: 1.Add diabetic restriction 2. Will continue to monitor   Goals: meet >85% EEN/EPN  Nutrition Goal Status: progressing towards    Communication of RD Recs: (POC, sticky note)     Reason for Assessment  Reason for Assessment: rd f/u   Dx:  1. Left main coronary artery disease    2. Angina pectoris    3. CAD (coronary artery disease)    4. Coronary artery disease, angina presence unspecified, unspecified vessel or lesion type, unspecified whether native or transplanted heart    5. Shortness of breath    6. Post-operative state    7. Acute blood loss anemia    8. Obesity, unspecified classification, unspecified obesity type, unspecified whether serious comorbidity present    9. On mechanically assisted ventilation    10. DELONTE on CPAP    11. S/P CABG x 2    12. Type 2 diabetes mellitus without complication, without long-term current use of insulin      Hx:  Past Medical History:   Diagnosis Date    Aortic stenosis     Asthma     BPH (benign prostatic hyperplasia)     CAD (coronary artery disease)     40% lesion 2002;alzo    Cirrhosis     Claudication 4/9/2014    Colon polyp     ED (erectile dysfunction)     Encounter for blood transfusion     Ex-smoker     Hearing loss NEC     Hepatitis C     Cured;yuly brownoni 2015    Hyperlipidemia     Hypertension     Hypothyroid     s/p tx graves    DELONTE on CPAP     Osteoarthritis     PVD (peripheral vascular disease)     Type 2 diabetes mellitus      Interdisciplinary Rounds: attended  General Information Comments: POD #1 s/p CABG x 2, Pt reports good appetite at home. Pt reported the he consumes 75%-100% of meals prior to admission, but appetite was moderate today. Pt report no recent wt loss. Pt appears well nourished. NFPE not performed due to pt showing no s/s of malnutrition. Pt reported no cultural/Latter-day food preferences. Pt  "verbalized understanding of cardiac and diabetic diet but does not follow one at home. Pt was educated on cardiac and diabetic diet.   11.8.18. Pt reports fair appetite, PO intake today ~ 75 %. Re- discussed cardiac diet w/ pt and wife.   Nutrition Discharge Planning: cardiac diabetic  diet    Nutrition Risk Screen    Nutrition Risk Screen: no indicators present    Nutrition/Diet History    Do you have any cultural, spiritual, Jainism conflicts, given your current situation?: none    Anthropometrics    Temp: 97.6 °F (36.4 °C)  Height Method: Measured  Height: 5' 8" (172.7 cm)  Height (inches): 68 in  Weight Method: Bed Scale  Weight: 114.6 kg (252 lb 10.4 oz)  Weight (lb): 252.65 lb  Ideal Body Weight (IBW), Male: 154 lb  % Ideal Body Weight, Male (lb): 166.49 lb  BMI (Calculated): 39.1  BMI Grade: 35 - 39.9 - obesity - grade II       Lab/Procedures/Meds    BMP  Lab Results   Component Value Date     (L) 11/08/2018    K 3.6 11/08/2018     11/08/2018    CO2 25 11/08/2018    BUN 13 11/08/2018    CREATININE 0.8 11/08/2018    CALCIUM 7.6 (L) 11/08/2018    ANIONGAP 8 11/08/2018    ESTGFRAFRICA >60 11/08/2018    EGFRNONAA >60 11/08/2018     Lab Results   Component Value Date    HGBA1C 6.2 (H) 11/04/2018     Recent Labs   Lab 11/08/18  1053   POCTGLUCOSE 174*     Lab Results   Component Value Date    ALBUMIN 3.5 11/04/2018       Lab Results   Component Value Date    CALCIUM 7.6 (L) 11/08/2018            Physical Findings/Assessment    Overall appearance: nourished   Oral/Mouth Cavity: tooth/teeth missing  Skin: Osbaldo score 16     Estimated/Assessed Needs    Weight Used For Calorie Calculations: 116.3 kg (256 lb 6.3 oz)  Energy Calorie Requirements (kcal): 2326  Energy Need Method: [place] x1.2  Protein Requirements: 139 g  Weight Used For Protein Calculations: 116.3 kg (256 lb 6.3 oz)  Fluid Requirements (mL): or per MD  Fluid Need Method: RDA Method  RDA Method (mL): 2326  CHO Requirement: " 50%EEN      Nutrition Prescription Ordered    Current Diet Order: Cardiac diet     Evaluation of Received Nutrient/Fluid Intake    Intake/Output Summary (Last 24 hours) at 11/8/2018 1150  Last data filed at 11/8/2018 0600  Gross per 24 hour   Intake --   Output 1600 ml   Net -1600 ml         % Intake of Estimated Energy Needs: 75%-100%  % Meal Intake:75%-100%     Nutrition Risk    Level of Risk/Frequency of Follow-up: (1xweekly)    Assessment and Plan  Nutrition Problem  Inadequate energy intake    Related to (etiology):   Clear Liquid Diet per surgery     Signs and Symptoms (as evidenced by):   Not meeting EEN/EPN    Interventions/Recommendations (treatment strategy):  See Above    Nutrition Diagnosis Status:   Resolved.11.8.18. Pt now on cardiac diet.          Monitor and Evaluation    Food and Nutrient Intake: energy intake, food and beverage intake  Food and Nutrient Adminstration: diet order  Knowledge/Beliefs/Attitudes: food and nutrition knowledge/skill  Anthropometric Measurements: weight  Biochemical Data, Medical Tests and Procedures: glucose/endocrine profile  Nutrition-Focused Physical Findings: overall appearance     Nutrition Follow-Up    RD Follow-up?: Yes

## 2018-11-08 NOTE — ASSESSMENT & PLAN NOTE
11/06/2018  The patient is postop day 1 status post coronary artery bypass grafting x2 and status post aortotomy for excision of of atherosclerotic plaques.  Overall the patient is doing well. Transferred to telemetry unit.  Neuro: The patient is awake alert and oriented x3.  He moves all 4.  Pain is well controlled with pain meds.  Cardiac:  Patient is off all drips.  Blood pressure is controlled with metoprolol.  Will escalate metoprolol dose as he tolerates.  Respiratory:  Patient was extubated on postop day 0.  Good sats on nasal cannula.  GI:  Patient is tolerating p.o. intake.  Advanced as tolerated.  Renal:  Patient has good urine output. creatinine is 0.8.  Heme:  The patient has a hematocrit of 24.1.  Platelet count is 80.  Will hold of Plavix until platelet count is above 100.  Activities:  Patient is out of bed and ambulating.  Advance as tolerated.  Lines tubes and drains patient has a mid level line, and pacer wires.  Will discontinue Armenta.    11/07/2018  The patient is postop day 2 status post coronary artery bypass grafting x2 and status post aortotomy for excision of of atherosclerotic plaques.  Overall the patient is doing well. Anticipate discharge in the next 24-48 hours   Neuro: The patient is awake alert and oriented x3.  He moves all 4.  Pain is well controlled with pain meds.  Cardiac:  Blood pressure is controlled with metoprolol.  Will escalate metoprolol dose as he tolerates.  Respiratory:  Good sats on nasal cannula.  GI:  Patient is tolerating p.o. intake.  Advanced as tolerated.  Renal:  Patient has good urine output. creatinine is 1.0.  Heme:  The patient has a hematocrit of 24.1.  Platelet count is 109. Will restart Plavix.  Activities:  Patient is out of bed and ambulating.  Advance as tolerated.  Lines tubes and drains patient has a mid level line, and pacer wires.     11/08/2018  The patient is postop day 3 status post coronary artery bypass grafting x2 and status post aortotomy  for excision of of atherosclerotic plaques.  Overall the patient is doing well. Anticipate discharge today   Neuro: The patient is awake alert and oriented x3.  He moves all 4.  Pain is well controlled with pain meds.  Cardiac:  Blood pressure is controlled with metoprolol.   Respiratory:  Good sats on nasal cannula.  GI:  Patient is tolerating p.o. intake.  Advanced as tolerated.  Renal:  Patient has good urine output. creatinine is 0.8  Heme:  The patient has a hematocrit of 22.9.  Platelet count is 99.  Activities:  Patient is out of bed and ambulating.  Advance as tolerated.  Lines tubes and drains patient has a mid level line,

## 2018-11-08 NOTE — PT/OT/SLP PROGRESS
Physical Therapy  Treatment    Erendira Pretty   MRN: [id number]   Admitting Diagnosis: Left main coronary artery disease    PT Received On: 11/08/18  PT Start Time: 0905     PT Stop Time: 0930    PT Total Time (min): 25 min       Billable Minutes:  Gait Training 15 and Therapeutic Exercise 10    Treatment Type: Treatment  PT/PTA: PT       General Precautions: Standard, sternal  Orthopedic Precautions: N/A     Subjective:  Communicated with NURSE SANCHEZ prior to session.  Pain/Comfort  Pain Rating 1: 0/10    Objective:   Patient found with: telemetry    Functional Mobility:  Therapeutic Activities and Exercises:  PT FOUND SEATED IN CHAIR UPON ARRIVAL, PT ABLE TO RECITE 3/5 STERNAL PRECAUTIONS SO ALL REVIEW, PT EDUCATED IN AND PERFORMED STANDING BLE THEREX X 15 REPS AROM WITH REST: MARCHING IN PLACE, HEEL RAISES, HIP ABD/ADD, MINI SQUATS.  PT REQUIRED MODA FOR SEATED SCOOT TOWARD EDGE OF CHAIR SINCE RECLINED, SBA FOR SIT>STAND, PT ' SBA, NO LOB OR SOB ON ROOM AIR, F DYNAMIC BALANCE, PT ASSIST TO BATHROOM WITH SBA    AM-PAC 6 CLICK MOBILITY  How much help from another person does this patient currently need?   1 = Unable, Total/Dependent Assistance  2 = A lot, Maximum/Moderate Assistance  3 = A little, Minimum/Contact Guard/Supervision  4 = None, Modified [place]/Independent    Turning over in bed (including adjusting bedclothes, sheets and blankets)?: 2  Sitting down on and standing up from a chair with arms (e.g., wheelchair, bedside commode, etc.): 4  Moving from lying on back to sitting on the side of the bed?: 2  Moving to and from a bed to a chair (including a wheelchair)?: 4  Need to walk in hospital room?: 4  Climbing 3-5 steps with a railing?: 1  Basic Mobility Total Score: 17    AM-PAC Raw Score CMS G-Code Modifier Level of Impairment Assistance   6 % Total / Unable   7 - 9 CM 80 - 100% Maximal Assist   10 - 14 CL 60 - 80% Moderate Assist   15 - 19 CK 40 - 60% Moderate Assist   20 - 22 CJ 20 -  40% Minimal Assist   23 CI 1-20% SBA / CGA   24 CH 0% Independent/ Mod I     Patient left up in chair with all lines intact, call button in reach, NURSE notified and WIFE present.    Assessment:  Erendira Pretty is a 73 y.o. male with a medical diagnosis of Left main coronary artery disease and presents with IMPAIRED FUNCTIONAL MOBILITY. PT WILL BENEFIT FROM CONT. SKILLED P.T. TO ADDRESS IMPAIRMENTS    Rehab identified problem list/impairments: Rehab identified problem list/impairments: weakness, impaired endurance, gait instability, impaired balance, decreased coordination, decreased safety awareness    Rehab potential is good.    Activity tolerance: Good    Discharge recommendations: Discharge Facility/Level Of Care Needs: home health PT     Barriers to discharge:      Equipment recommendations: Equipment Needed After Discharge: bath bench     GOALS:   Multidisciplinary Problems     Physical Therapy Goals        Problem: Physical Therapy Goal    Goal Priority Disciplines Outcome Goal Variances Interventions   Physical Therapy Goal     PT, PT/OT Ongoing (interventions implemented as appropriate)     Description:  LTG'S TO BE MET IN 7 DAYS (11-13-18)  1. PT WILL REQUIRE SBA FOR BED MOBILITY  2. PT WILL BE KANCHAN WITH TF'S  3. PT WILL ' KANCHAN  4. PT WILL DEMO G DYNAMIC BALANCE DURING GAIT                    PLAN:    Patient to be seen 5 x/week  to address the above listed problems via gait training, therapeutic activities, therapeutic exercises  Plan of Care expires: 11/13/18  Plan of Care reviewed with: patient    PT ENCOURAGED TO CALL FOR ASSISTANCE WITH ALL NEEDS DUE TO FALL RISK STATUS, PT AGREEABLE    Karmen Harmon, PT  11/08/2018

## 2018-11-08 NOTE — PT/OT/SLP PROGRESS
Physical Therapy  Treatment    Erendira Pretty   MRN: [id number]   Admitting Diagnosis: Left main coronary artery disease    PT Received On: 11/08/18  PT Start Time: 1110     PT Stop Time: 1135    PT Total Time (min): 25 min       Billable Minutes:  Gait Training 15 and Therapeutic Activity 10    Treatment Type: Treatment  PT/PTA: PT       General Precautions: Standard, sternal  Orthopedic Precautions: N/A   Braces: N/A    Subjective:  Communicated with NURSE SANCHEZ prior to session.  Pain/Comfort  Pain Rating 1: 0/10    Objective:   Patient found with: telemetry    Functional Mobility:  Therapeutic Activities and Exercises:  PT FOUND SEATED IN CHAIR UPON ARRIVAL, PT ABLE TO RECITE 3/5 STERNAL PRECAUTIONS SO ALL REVIEWED, PT EDUCATED IN AND PERFORMED SEATED BLE THEREX X 15 REPS AROM WITH REST,  PT REQUIRED MODA FOR SEATED SCOOT TOWARD EDGE OF CHAIR SINCE RECLINED, SBA FOR SIT>STAND, PT ' SBA, NO LOB OR SOB ON ROOM AIR, F DYNAMIC BALANCE    AM-PAC 6 CLICK MOBILITY  How much help from another person does this patient currently need?   1 = Unable, Total/Dependent Assistance  2 = A lot, Maximum/Moderate Assistance  3 = A little, Minimum/Contact Guard/Supervision  4 = None, Modified [place]/Independent    Turning over in bed (including adjusting bedclothes, sheets and blankets)?: 2  Sitting down on and standing up from a chair with arms (e.g., wheelchair, bedside commode, etc.): 4  Moving from lying on back to sitting on the side of the bed?: 2  Moving to and from a bed to a chair (including a wheelchair)?: 4  Need to walk in hospital room?: 4  Climbing 3-5 steps with a railing?: 1  Basic Mobility Total Score: 17    AM-PAC Raw Score CMS G-Code Modifier Level of Impairment Assistance   6 % Total / Unable   7 - 9 CM 80 - 100% Maximal Assist   10 - 14 CL 60 - 80% Moderate Assist   15 - 19 CK 40 - 60% Moderate Assist   20 - 22 CJ 20 - 40% Minimal Assist   23 CI 1-20% SBA / CGA   24 CH 0% Independent/ Mod I      Patient left up in chair with all lines intact, call button in reach, NURSE notified and WIFE present.    Assessment:  Erendira Pretty is a 73 y.o. male with a medical diagnosis of Left main coronary artery disease and presents with IMPAIRED FUNCTIONAL MOBILITY. PT WILL BENEFIT FROM CONT. SKILLED P.T. TO ADDRESS IMPAIRMENTS    Rehab identified problem list/impairments: Rehab identified problem list/impairments: weakness, impaired endurance, gait instability, impaired balance, decreased coordination, decreased safety awareness    Rehab potential is good.    Activity tolerance: Good    Discharge recommendations: Discharge Facility/Level Of Care Needs: home health PT     Barriers to discharge:     Equipment recommendations: Equipment Needed After Discharge: bath bench     GOALS:   Multidisciplinary Problems     Physical Therapy Goals        Problem: Physical Therapy Goal    Goal Priority Disciplines Outcome Goal Variances Interventions   Physical Therapy Goal     PT, PT/OT Ongoing (interventions implemented as appropriate)     Description:  LTG'S TO BE MET IN 7 DAYS (11-13-18)  1. PT WILL REQUIRE SBA FOR BED MOBILITY  2. PT WILL BE KANCHAN WITH TF'S  3. PT WILL ' KANCHAN  4. PT WILL DEMO G DYNAMIC BALANCE DURING GAIT                    PLAN:    Patient to be seen 5 x/week  to address the above listed problems via gait training, therapeutic activities, therapeutic exercises  Plan of Care expires: 11/13/18  Plan of Care reviewed with: patient    Karmen Rahmanard, PT  11/08/2018

## 2018-11-08 NOTE — PROGRESS NOTES
[facility] -   Cardiothoracic Surgery  Progress Note    Patient Name: Erendira Pretty  MRN: [id number]  Admission Date: 11/2/2018  Hospital Length of Stay: 3 days  Code Status: Full Code   Attending Physician: Bear De Luna MD   Referring Provider: Cortes Roblero MD  Principal Problem:Left main coronary artery disease            Subjective:     Post-Op Info:  Procedure(s) (LRB):  CORONARY ARTERY BYPASS GRAFT (CABG) (N/A)  SURGICAL PROCUREMENT, VEIN, ENDOSCOPIC (Left)  ECHOCARDIOGRAM,TRANSESOPHAGEAL (N/A)   3 Days Post-Op     Interval History:  The patient is postop day 3 status post coronary artery bypass grafting x2 man status post aortotomy for excision of ascending aorta atherosclerotic plaques    ROS  Medications:  Continuous Infusions:  Scheduled Meds:   arformoterol  15 mcg Nebulization BID    aspirin  81 mg Oral Daily    budesonide  0.5 mg Nebulization BID    chlorhexidine  10 mL Mouth/Throat BID    clopidogrel  75 mg Oral Daily    docusate sodium  100 mg Oral BID    famotidine  20 mg Oral BID    furosemide  40 mg Intravenous BID    insulin detemir U-100  10 Units Subcutaneous QHS    metoprolol tartrate  100 mg Oral BID    nozaseptin   Each Nare BID    polyethylene glycol  17 g Oral Daily    potassium chloride  20 mEq Oral Q12H     PRN Meds:albuterol sulfate, dextrose 50%, dextrose 50%, glucagon (human recombinant), glucose, glucose, insulin aspart U-100, lactated ringers, metoclopramide HCl, ondansetron, oxyCODONE, oxyCODONE, simethicone     Objective:     Vital Signs (Most Recent):  Temp: 98.6 °F (37 °C) (11/08/18 1227)  Pulse: 79 (11/08/18 1300)  Resp: 20 (11/08/18 1227)  BP: (!) 91/50 (11/08/18 1227)  SpO2: (!) 94 % (11/08/18 1227) Vital Signs (24h Range):  Temp:  [97.2 °F (36.2 °C)-98.8 °F (37.1 °C)] 98.6 °F (37 °C)  Pulse:  [79-97] 79  Resp:  [18-20] 20  SpO2:  [92 %-95 %] 94 %  BP: ()/(50-78) 91/50     Weight: 114.6 kg (252 lb 10.4 oz)  Body mass index is 38.41  kg/m².    SpO2: (!) 94 %  O2 Device (Oxygen Therapy): room air    Intake/Output - Last 3 Shifts       11/06 0700 - 11/07 0659 11/07 0700 - 11/08 0659 11/08 0700 - 11/09 0659    P.O. 900      I.V. (mL/kg) 127.9 (1.1)      IV Piggyback 100      Total Intake(mL/kg) 1127.9 (9.9)      Urine (mL/kg/hr) 1170 (0.4) 1600 (0.6)     Drains 0      Stool  0     Chest Tube       Total Output 1170 1600     Net -42.1 -1600            Stool Occurrence 1 x 1 x           Lines/Drains/Airways     Peripherally Inserted Central Catheter Line                 PICC Double Lumen 11/06/18 1319 right brachial 2 days                Physical Exam   Constitutional: He is oriented to person, place, and time. He appears well-developed and well-nourished.   HENT:   Head: Normocephalic and atraumatic.   Neck: No JVD present.   Cardiovascular: Normal rate, regular rhythm and normal heart sounds.   Pulmonary/Chest: Effort normal and breath sounds normal.   Abdominal: Soft. Bowel sounds are normal.   Musculoskeletal: He exhibits no edema or deformity.   Neurological: He is alert and oriented to person, place, and time. No cranial nerve deficit.   Skin: Skin is warm and dry.       Significant Labs:  All pertinent labs from the last 24 hours have been reviewed.    Significant Diagnostics:  I have reviewed and interpreted all pertinent imaging results/findings within the past 24 hours.    Assessment/Plan:     * Left main coronary artery disease    The patient is a 73-year-old male who was worked up for complaints of shortness of breath.  Cardiac catheterization today shows significant left main coronary artery disease.  The patient is a candidate for coronary artery bypass grafting which will be scheduled for 11/05/2018.  The risks and benefits of surgery were explained to the patient. The patient understands and has agreed to proceed with surgery..     S/P CABG x 2    11/06/2018  The patient is postop day 1 status post coronary artery bypass grafting x2  and status post aortotomy for excision of of atherosclerotic plaques.  Overall the patient is doing well. Transferred to telemetry unit.  Neuro: The patient is awake alert and oriented x3.  He moves all 4.  Pain is well controlled with pain meds.  Cardiac:  Patient is off all drips.  Blood pressure is controlled with metoprolol.  Will escalate metoprolol dose as he tolerates.  Respiratory:  Patient was extubated on postop day 0.  Good sats on nasal cannula.  GI:  Patient is tolerating p.o. intake.  Advanced as tolerated.  Renal:  Patient has good urine output. creatinine is 0.8.  Heme:  The patient has a hematocrit of 24.1.  Platelet count is 80.  Will hold of Plavix until platelet count is above 100.  Activities:  Patient is out of bed and ambulating.  Advance as tolerated.  Lines tubes and drains patient has a mid level line, and pacer wires.  Will discontinue Armenta.    11/07/2018  The patient is postop day 2 status post coronary artery bypass grafting x2 and status post aortotomy for excision of of atherosclerotic plaques.  Overall the patient is doing well. Anticipate discharge in the next 24-48 hours   Neuro: The patient is awake alert and oriented x3.  He moves all 4.  Pain is well controlled with pain meds.  Cardiac:  Blood pressure is controlled with metoprolol.  Will escalate metoprolol dose as he tolerates.  Respiratory:  Good sats on nasal cannula.  GI:  Patient is tolerating p.o. intake.  Advanced as tolerated.  Renal:  Patient has good urine output. creatinine is 1.0.  Heme:  The patient has a hematocrit of 24.1.  Platelet count is 109. Will restart Plavix.  Activities:  Patient is out of bed and ambulating.  Advance as tolerated.  Lines tubes and drains patient has a mid level line, and pacer wires.     11/08/2018  The patient is postop day 3 status post coronary artery bypass grafting x2 and status post aortotomy for excision of of atherosclerotic plaques.  Overall the patient is doing well.  Anticipate discharge today   Neuro: The patient is awake alert and oriented x3.  He moves all 4.  Pain is well controlled with pain meds.  Cardiac:  Blood pressure is controlled with metoprolol.   Respiratory:  Good sats on nasal cannula.  GI:  Patient is tolerating p.o. intake.  Advanced as tolerated.  Renal:  Patient has good urine output. creatinine is 0.8  Heme:  The patient has a hematocrit of 22.9.  Platelet count is 99.  Activities:  Patient is out of bed and ambulating.  Advance as tolerated.  Lines tubes and drains patient has a mid level line,              Bear De Luna MD  Cardiothoracic Surgery  [facility] -

## 2018-11-08 NOTE — SUBJECTIVE & OBJECTIVE
Interval History:  The patient is postop day 3 status post coronary artery bypass grafting x2 man status post aortotomy for excision of ascending aorta atherosclerotic plaques    ROS  Medications:  Continuous Infusions:  Scheduled Meds:   arformoterol  15 mcg Nebulization BID    aspirin  81 mg Oral Daily    budesonide  0.5 mg Nebulization BID    chlorhexidine  10 mL Mouth/Throat BID    clopidogrel  75 mg Oral Daily    docusate sodium  100 mg Oral BID    famotidine  20 mg Oral BID    furosemide  40 mg Intravenous BID    insulin detemir U-100  10 Units Subcutaneous QHS    metoprolol tartrate  100 mg Oral BID    nozaseptin   Each Nare BID    polyethylene glycol  17 g Oral Daily    potassium chloride  20 mEq Oral Q12H     PRN Meds:albuterol sulfate, dextrose 50%, dextrose 50%, glucagon (human recombinant), glucose, glucose, insulin aspart U-100, lactated ringers, metoclopramide HCl, ondansetron, oxyCODONE, oxyCODONE, simethicone     Objective:     Vital Signs (Most Recent):  Temp: 98.6 °F (37 °C) (11/08/18 1227)  Pulse: 79 (11/08/18 1300)  Resp: 20 (11/08/18 1227)  BP: (!) 91/50 (11/08/18 1227)  SpO2: (!) 94 % (11/08/18 1227) Vital Signs (24h Range):  Temp:  [97.2 °F (36.2 °C)-98.8 °F (37.1 °C)] 98.6 °F (37 °C)  Pulse:  [79-97] 79  Resp:  [18-20] 20  SpO2:  [92 %-95 %] 94 %  BP: ()/(50-78) 91/50     Weight: 114.6 kg (252 lb 10.4 oz)  Body mass index is 38.41 kg/m².    SpO2: (!) 94 %  O2 Device (Oxygen Therapy): room air    Intake/Output - Last 3 Shifts       11/06 0700 - 11/07 0659 11/07 0700 - 11/08 0659 11/08 0700 - 11/09 0659    P.O. 900      I.V. (mL/kg) 127.9 (1.1)      IV Piggyback 100      Total Intake(mL/kg) 1127.9 (9.9)      Urine (mL/kg/hr) 1170 (0.4) 1600 (0.6)     Drains 0      Stool  0     Chest Tube       Total Output 1170 1600     Net -42.1 -1600            Stool Occurrence 1 x 1 x           Lines/Drains/Airways     Peripherally Inserted Central Catheter Line                 PICC Double  Lumen 11/06/18 1319 right brachial 2 days                Physical Exam   Constitutional: He is oriented to person, place, and time. He appears well-developed and well-nourished.   HENT:   Head: Normocephalic and atraumatic.   Neck: No JVD present.   Cardiovascular: Normal rate, regular rhythm and normal heart sounds.   Pulmonary/Chest: Effort normal and breath sounds normal.   Abdominal: Soft. Bowel sounds are normal.   Musculoskeletal: He exhibits no edema or deformity.   Neurological: He is alert and oriented to person, place, and time. No cranial nerve deficit.   Skin: Skin is warm and dry.       Significant Labs:  All pertinent labs from the last 24 hours have been reviewed.    Significant Diagnostics:  I have reviewed and interpreted all pertinent imaging results/findings within the past 24 hours.

## 2018-11-08 NOTE — PLAN OF CARE
Problem: Physical Therapy Goal  Goal: Physical Therapy Goal  LTG'S TO BE MET IN 7 DAYS (11-13-18)  1. PT WILL REQUIRE SBA FOR BED MOBILITY  2. PT WILL BE KANCHAN WITH TF'S  3. PT WILL ' KANCHAN  4. PT WILL DEMO G DYNAMIC BALANCE DURING GAIT   Outcome: Ongoing (interventions implemented as appropriate)  PT WITH GOOD PARTICIPATION, 600' IN GAIT SBA

## 2018-11-08 NOTE — PLAN OF CARE
Problem: Patient Care Overview  Goal: Plan of Care Review  Outcome: Ongoing (interventions implemented as appropriate)  POC reviewed with patient, verbalized understanding. Pt remains free from falls. Fall precautions in place. A paced at 90 on cardiac monitor. VSS. External pacemaker still in place. Wound vac on the midline incision. Pt had one dose of pain medication during my shift. Pt has been ambulating to and from the bathroom with minimal dyspnea. Pt has is chlorehexidine bath this am and linens were changed. Pt up sitting up in the recliner and doing his IS. Sternal precautions in place.  No other complaints at this time. Call bell w/in reach. Reminded to call for assistance.

## 2018-11-08 NOTE — PLAN OF CARE
Problem: Patient Care Overview  Goal: Plan of Care Review  Outcome: Ongoing (interventions implemented as appropriate)    Recommendations     Recommendation/Intervention: 1.Add diabetic restriction 2. Will continue to monitor   Goals: meet >85% EEN/EPN  Nutrition Goal Status: progressing towards    Communication of RD Recs: (POC, sticky note)

## 2018-11-08 NOTE — PROGRESS NOTES
[facility] -   Cardiology  Progress Note    Patient Name: Erendira Pretty  MRN: [id number]  Admission Date: 11/2/2018  Hospital Length of Stay: 3 days  Code Status: Full Code   Attending Physician: Bear De Luna MD   Primary Care Physician: Cortes Roblero MD  Expected Discharge Date:   Principal Problem:Left main coronary artery disease    Subjective:   HPI:  Mr. Pretty is a 73 year old male who presented to [facility] due to LHC per Dr. Gallardo. R/LHC revealed significant LM involving LCX, ostial and LAD-ostial, normal LV function, Mild AS and CVT consulted. CVT recommended CABG on Monday, 11/5. Patient placed in outpatient recovery awaiting CABG for Monday.     Hospital Course:   11/3/18-Patient seen and examined in room, lying in bed. Denies chest pain or shortness of breath today.     11/4/18-Patient seen and examined in room, lying in bed. Denies chest pan or shortness of breath today. Labs reviewed, K+ 3.6, Cr 1.0. Pending CABG in AM.     11/5/18-Patient seen and examined, s/p CABG x 2 this AM. Remains stable CV wise.     11/6/18-Patient seen and examined today, POD #1 s/p CABG x 2. Sitting up in chair. Feels ok, complains of incisional pain. Labs stable.     11/7/18-Patient seen and examined today, POD # 2 s/p CABG x 2. Resting in bed. No complaints. Denies chest pain or SOB. Remains in SR. Labs stable.     11/8/18-Patient seen and examined today, POD # 3 s/p CABG x 2. Sitting up in chair. Denies chest pain or SOB. Ambulate yesterday and today without any issues. Remains in SR. Labs reviewed, H and H with drop 7.9/22.7.      Review of Systems   Constitution: Negative.   HENT: Negative.    Eyes: Negative.    Cardiovascular: Negative.    Respiratory: Negative.    Endocrine: Negative.    Hematologic/Lymphatic: Negative.    Skin: Negative.    Musculoskeletal: Negative.    Gastrointestinal: Negative.    Genitourinary: Negative.    Neurological: Negative.    Psychiatric/Behavioral: Negative.     Allergic/Immunologic: Negative.      Objective:     Vital Signs (Most Recent):  Temp: 97.6 °F (36.4 °C) (11/08/18 0725)  Pulse: 80 (11/08/18 1100)  Resp: 18 (11/08/18 0826)  BP: 110/60 (11/08/18 0725)  SpO2: (!) 94 % (11/08/18 0826) Vital Signs (24h Range):  Temp:  [97.2 °F (36.2 °C)-98.8 °F (37.1 °C)] 97.6 °F (36.4 °C)  Pulse:  [80-97] 80  Resp:  [18-20] 18  SpO2:  [92 %-95 %] 94 %  BP: (105-119)/(59-78) 110/60     Weight: 114.6 kg (252 lb 10.4 oz)  Body mass index is 38.41 kg/m².     SpO2: (!) 94 %  O2 Device (Oxygen Therapy): room air      Intake/Output Summary (Last 24 hours) at 11/8/2018 1203  Last data filed at 11/8/2018 0600  Gross per 24 hour   Intake --   Output 1600 ml   Net -1600 ml       Lines/Drains/Airways     Peripherally Inserted Central Catheter Line                 PICC Double Lumen 11/06/18 1319 right brachial 1 day                Physical Exam   Constitutional: He is oriented to person, place, and time. He appears well-developed and well-nourished. No distress.   HENT:   Head: Normocephalic and atraumatic.   Eyes: Pupils are equal, round, and reactive to light. Right eye exhibits no discharge. Left eye exhibits no discharge.   Neck: Neck supple. No JVD present. No thyromegaly present.   Cardiovascular: Normal rate, regular rhythm, S1 normal, S2 normal and normal heart sounds.   No murmur heard.  Pulmonary/Chest: Effort normal.   CABG site C/D/I; no bleeding, erythema, or drainage    Diminished BS at bases   Abdominal: Soft. He exhibits no distension. There is no tenderness. There is no rebound.   Musculoskeletal: He exhibits no edema.   Neurological: He is alert and oriented to person, place, and time.   Skin: Skin is warm and dry. He is not diaphoretic. No erythema.   Psychiatric: He has a normal mood and affect. His behavior is normal. Thought content normal.   Nursing note and vitals reviewed.      Significant Labs:   CMP   Recent Labs   Lab 11/07/18  0554 11/07/18 2021 11/08/18  0432   NA  135* 132* 135*   K 4.2 3.7 3.6   CL 99 96 102   CO2 26 26 25   * 178* 130*   BUN 16 15 13   CREATININE 1.0 0.8 0.8   CALCIUM 8.3* 8.1* 7.6*   ANIONGAP 10 10 8   ESTGFRAFRICA >60 >60 >60   EGFRNONAA >60 >60 >60   , CBC   Recent Labs   Lab 11/07/18  0554 11/08/18  0432   WBC 12.10 9.24   HGB 9.4* 7.9*   HCT 27.0* 22.7*   * 99*   , Troponin No results for input(s): TROPONINI in the last 48 hours. and All pertinent lab results from the last 24 hours have been reviewed.    Significant Imaging: Echocardiogram:   2D echo with color flow doppler:   Results for orders placed or performed in visit on 10/25/18   2D echo with color flow doppler   Result Value Ref Range    Calculated EF 55 55 - 65    Diastolic Dysfunction No     Aortic Valve Stenosis MILD TO MODERATE (A)     Est. PA Systolic Pressure 27.04     Mitral Valve Mobility NORMAL     Tricuspid Valve Regurgitation MILD     and X-Ray: CXR: X-Ray Chest 1 View (CXR): No results found for this visit on 11/02/18. and X-Ray Chest PA and Lateral (CXR):   Results for orders placed or performed during the hospital encounter of 11/02/18   X-Ray Chest PA And Lateral    Narrative    EXAMINATION:  XR CHEST PA AND LATERAL    CLINICAL HISTORY:  Pre Op;    TECHNIQUE:  Single frontal view of the chest was performed.    COMPARISON:  None    FINDINGS:  The lungs are clear, with normal appearance of pulmonary vasculature and no pleural effusion or pneumothorax.    The cardiac silhouette is normal in size. The hilar and mediastinal contours are unremarkable.    Bones are intact.      Impression    No acute abnormality.      Electronically signed by: Vincent Gonzalez Jr., MD  Date:    11/02/2018  Time:    22:59     Assessment and Plan:   Patient who presents with angina/critical LM disease, s/p CABG x x 2. Remains stable CV wise. Continue same meds.     * Left main coronary artery disease    R/LHC completed yesterday which revealed significant LM involving LCX, ostial and  LAD-ostial, normal LV function, Mild AS with CVT consult for CABG  CVT consult for CABG on Monday, 11/5.   Continue ASA, Statin, BB, ARB  Denies chest pain or shortness of breath on exam.    11/5/18  -s/p CABG x 2 today  -Remains stable  -Resume ASA, statin, BB, ARB, Plavix when able    11/6/18  -POD # 1 s/p CABG x 2  -Remains stable CV wise  -Continue ASA, statin, BB, Plavix  -Resume ARB when able  -IS usage and ambulation    11/7/18  -POD # 2 s/p CABG x 2  -Stable CV wise  -Continue ASA, statin, BB, Plavix  -Resume ARB when able  -Needs to ambulate  -IS usage    11/8/18  -POD # 3 s/p CABG x 2  -Remains stable CV wise  -Continue ASA, statin, BB, Plavix  -ARB held due to borderline BP; will reassess as OP     Mixed hyperlipidemia    -Continue statin and niacin     Aortic stenosis mild    -Mild AS per [facility]   -Stable     Obesity, unspecified    -Encourage weight loss     Essential hypertension    -Continue BB  -ARB held due to borderline BP, will reassess in clinic         VTE Risk Mitigation (From admission, onward)        Ordered     IP VTE LOW RISK PATIENT  Once      11/02/18 1800     Place DHEEARJ hose  Until discontinued      11/02/18 1800     Place sequential compression device  Until discontinued      11/02/18 1800          Aster Petty PA-C  Cardiology  [facility] -     Chart reviewed. Dr. Wang examined patient and agrees with plan as outlined above.

## 2018-11-08 NOTE — ASSESSMENT & PLAN NOTE
R/LHC completed yesterday which revealed significant LM involving LCX, ostial and LAD-ostial, normal LV function, Mild AS with CVT consult for CABG  CVT consult for CABG on Monday, 11/5.   Continue ASA, Statin, BB, ARB  Denies chest pain or shortness of breath on exam.    11/5/18  -s/p CABG x 2 today  -Remains stable  -Resume ASA, statin, BB, ARB, Plavix when able    11/6/18  -POD # 1 s/p CABG x 2  -Remains stable CV wise  -Continue ASA, statin, BB, Plavix  -Resume ARB when able  -IS usage and ambulation    11/7/18  -POD # 2 s/p CABG x 2  -Stable CV wise  -Continue ASA, statin, BB, Plavix  -Resume ARB when able  -Needs to ambulate  -IS usage    11/8/18  -POD # 3 s/p CABG x 2  -Remains stable CV wise  -Continue ASA, statin, BB, Plavix  -ARB held due to borderline BP; will reassess as OP

## 2018-11-08 NOTE — DISCHARGE SUMMARY
[facility] -   Cardiothoracic Surgery  Discharge Summary      Patient Name: Erendira Pretty  MRN: [id number]  Admission Date: 11/2/2018  Hospital Length of Stay: 3 days  Discharge Date and Time: 11/08/2018  Attending Physician: Bear De Luna MD   Discharging Provider: Bear De Luna MD  Primary Care Provider: Cortes Roblero MD    HPI:   The patient is a 73-year-old male who was worked up for complaints of dyspnea on exertion.  The patient was in his usual state of health until about a year ago when he noticed dyspnea on exertion on moderate to severe exertion.  The patient's symptoms have been progressively getting worse.  The patient denies orthopnea or PND.  He denies palpitations or dizziness.  The patient has mild ankle swelling right greater than left. He denies chest pain. He denies fever or chills.  But the patient has a chronic cough.  Cardiac catheterization done today shows critical left main stenosis.    Procedure(s) (LRB):  CORONARY ARTERY BYPASS GRAFT (CABG) (N/A)  SURGICAL PROCUREMENT, VEIN, ENDOSCOPIC (Left)  ECHOCARDIOGRAM,TRANSESOPHAGEAL (N/A)      Indwelling Lines/Drains at time of discharge:   Lines/Drains/Airways     Peripherally Inserted Central Catheter Line                 PICC Double Lumen 11/06/18 1319 right brachial 2 days              Hospital Course: 11/06/2018  The patient is postop day 1.  Status post coronary artery bypass grafting x2 and ascending aortotomy for excision of atherosclerotic plaques.    11/07/2018  The patient is postop day 2.  Status post coronary artery bypass grafting x2 and ascending aortotomy 4 at excision of atherosclerotic plaques    11/08/2018  The patient is postop day 3.  Status post coronary artery bypass grafting x2 and ascending aorta putting me for excision times tolerated Plavix.    Consults (From admission, onward)        Status Ordering Provider     Consult Case Management/Social Work  Once     Provider:  (Not yet assigned)    Completed  ABE GOLD     Consult to Case Management/Social Work  Once     Provider:  (Not yet assigned)    Completed ABE GOLD     Inpatient consult to Registered Dietitian/Nutritionist  Once     Provider:  (Not yet assigned)    Completed ABE GOLD.          Significant Diagnostic Studies: Labs:   BMP:   Recent Labs   Lab 11/07/18  0554 11/07/18 2021 11/08/18  0432   * 178* 130*   * 132* 135*   K 4.2 3.7 3.6   CL 99 96 102   CO2 26 26 25   BUN 16 15 13   CREATININE 1.0 0.8 0.8   CALCIUM 8.3* 8.1* 7.6*   MG 2.1 1.9 2.1       Pending Diagnostic Studies:     None          S/P CABG x 2    11/06/2018  The patient is postop day 1 status post coronary artery bypass grafting x2 and status post aortotomy for excision of of atherosclerotic plaques.  Overall the patient is doing well. Transferred to telemetry unit.  Neuro: The patient is awake alert and oriented x3.  He moves all 4.  Pain is well controlled with pain meds.  Cardiac:  Patient is off all drips.  Blood pressure is controlled with metoprolol.  Will escalate metoprolol dose as he tolerates.  Respiratory:  Patient was extubated on postop day 0.  Good sats on nasal cannula.  GI:  Patient is tolerating p.o. intake.  Advanced as tolerated.  Renal:  Patient has good urine output. creatinine is 0.8.  Heme:  The patient has a hematocrit of 24.1.  Platelet count is 80.  Will hold of Plavix until platelet count is above 100.  Activities:  Patient is out of bed and ambulating.  Advance as tolerated.  Lines tubes and drains patient has a mid level line, and pacer wires.  Will discontinue Armenta.    11/07/2018  The patient is postop day 2 status post coronary artery bypass grafting x2 and status post aortotomy for excision of of atherosclerotic plaques.  Overall the patient is doing well. Anticipate discharge in the next 24-48 hours   Neuro: The patient is awake alert and oriented x3.  He moves all 4.  Pain is well controlled with pain meds.  Cardiac:   Blood pressure is controlled with metoprolol.  Will escalate metoprolol dose as he tolerates.  Respiratory:  Good sats on nasal cannula.  GI:  Patient is tolerating p.o. intake.  Advanced as tolerated.  Renal:  Patient has good urine output. creatinine is 1.0.  Heme:  The patient has a hematocrit of 24.1.  Platelet count is 109. Will restart Plavix.  Activities:  Patient is out of bed and ambulating.  Advance as tolerated.  Lines tubes and drains patient has a mid level line, and pacer wires.     11/08/2018  The patient is postop day 3 status post coronary artery bypass grafting x2 and status post aortotomy for excision of of atherosclerotic plaques.  Overall the patient is doing well. Anticipate discharge today   Neuro: The patient is awake alert and oriented x3.  He moves all 4.  Pain is well controlled with pain meds.  Cardiac:  Blood pressure is controlled with metoprolol.   Respiratory:  Good sats on nasal cannula.  GI:  Patient is tolerating p.o. intake.  Advanced as tolerated.  Renal:  Patient has good urine output. creatinine is 0.8  Heme:  The patient has a hematocrit of 22.9.  Platelet count is 99.  Activities:  Patient is out of bed and ambulating.  Advance as tolerated.  Lines tubes and drains patient has a mid level line,            Final Active Diagnoses:    Diagnosis Date Noted POA    PRINCIPAL PROBLEM:  Left main coronary artery disease [I25.10] 11/02/2018 Yes    S/P CABG x 2 [Z95.1] 11/05/2018 Not Applicable    Acute blood loss anemia [D62] 11/05/2018 No    Mixed hyperlipidemia [E78.2] 11/03/2018 Yes    SOB (shortness of breath) [R06.02] 11/02/2018 Unknown    Pulmonary hypertension [I27.20] 11/02/2018 Unknown    Angina pectoris [I20.9] 11/02/2018 Yes    Aortic stenosis mild [I35.0]  Yes     Chronic    Chest pain, moderate coronary artery risk [R07.9] 09/13/2017 Yes    DELONTE on CPAP [G47.33, Z99.89] 07/07/2015 Yes     Chronic    CAD (coronary artery disease) [I25.10]  Yes    Obesity,  unspecified [E66.9] 07/08/2013 Yes     Chronic    Essential hypertension [I10] 07/08/2013 Yes    Type 2 diabetes mellitus with post op hyperglycemia [E11.9] 07/08/2013 Yes      Problems Resolved During this Admission:    Diagnosis Date Noted Date Resolved POA    On mechanically assisted ventilation [Z99.11] 11/05/2018 11/06/2018 Not Applicable      Discharged Condition: stable    Disposition: Home or Self Care    Follow Up:  Follow-up Information     Cortes Roblero MD In 1 week.    Specialty:  Family Medicine  Contact information:  [address] [place] [id number] [phone]             Frank Gallardo MD In 1 week.    Specialties:  Cardiology, INTERVENTIONAL CARDIOLOGY  Contact information:  [address] [place] [id number] [phone]             Bear De Luna MD On 11/21/2018.    Specialty:  Cardiothoracic Surgery  Contact information:  [address] [place] [address] [address] [id number]  [phone]                 Patient Instructions:      Referral to Home health   Referral Priority: Routine Referral Type: Home Health   Referral Reason: Specialty Services Required   Requested Specialty: Home Health Services   Number of Visits Requested: 1     Diet Cardiac     Diet diabetic     No driving until:     Notify your health care provider if you experience any of the following:  temperature >100.4     Notify your health care provider if you experience any of the following:  persistent nausea and vomiting or diarrhea     Notify your health care provider if you experience any of the following:  severe uncontrolled pain     Notify your health care provider if you experience any of the following:  redness, tenderness, or signs of infection (pain, swelling, redness, odor or green/yellow discharge around incision site)     Notify your health care provider if you experience any of the following:  difficulty breathing or increased cough     Notify your health care provider if you experience any  of the following:  severe persistent headache     Notify your health care provider if you experience any of the following:  worsening rash     Notify your health care provider if you experience any of the following:  persistent dizziness, light-headedness, or visual disturbances     Notify your health care provider if you experience any of the following:  increased confusion or weakness     Notify your health care provider if you experience any of the following:     Medications:  Reconciled Home Medications:      Medication List      START taking these medications    aspirin 81 MG EC tablet  Commonly known as:  ECOTRIN  Take 1 tablet (81 mg total) by mouth once daily.  Start taking on:  11/9/2018     clopidogrel 75 mg tablet  Commonly known as:  PLAVIX  Take 1 tablet (75 mg total) by mouth once daily.  Start taking on:  11/9/2018     furosemide 40 MG tablet  Commonly known as:  LASIX  Take 1 tablet (40 mg total) by mouth once daily. for 14 days     oxyCODONE 5 MG immediate release tablet  Commonly known as:  ROXICODONE  Take 1 tablet (5 mg total) by mouth every 4 (four) hours as needed for Pain.     polyethylene glycol 17 gram Pwpk  Commonly known as:  GLYCOLAX  Take 17 g by mouth once daily.  Start taking on:  11/9/2018     potassium chloride SA 20 MEQ tablet  Commonly known as:  K-DUR,KLOR-CON  Take 1 tablet (20 mEq total) by mouth once daily. for 14 days        CHANGE how you take these medications    budesonide-formoterol 160-4.5 mcg 160-4.5 mcg/actuation Hfaa  Commonly known as:  SYMBICORT  INHALE 2 PUFFS INTO THE LUNGS TWO TIMES A DAY  What changed:    · how much to take  · how to take this  · when to take this  · additional instructions     HumaLOG KwikPen Insulin 100 unit/mL Inpn pen  Generic drug:  insulin lispro  INJECT 3-4 UNITS INTO THE SKIN THREE TIMES DAILY BEFORE MEALS.  What changed:  See the new instructions.     metoprolol succinate 50 MG 24 hr tablet  Commonly known as:  TOPROL-XL  Take 1 tablet  "(50 mg total) by mouth once daily.  What changed:  See the new instructions.     mometasone 50 mcg/actuation nasal spray  Commonly known as:  NASONEX  2 sprays by Nasal route once daily.  What changed:    · when to take this  · reasons to take this        CONTINUE taking these medications    albuterol-ipratropium 2.5 mg-0.5 mg/3 mL nebulizer solution  Commonly known as:  DUO-NEB  Take 3 mLs by nebulization 2 (two) times daily. Rescue     CENTRUM SPECIALIST HEART ORAL  Take 1 tablet by mouth once daily.     diphenhydrAMINE 50 MG capsule  Commonly known as:  BENADRYL  Take 1 capsule (50 mg total) by mouth every 8 (eight) hours. Take every 8 hours with the last one 2 hours before procedure     docusate sodium 100 MG capsule  Commonly known as:  COLACE  Take 1 capsule (100 mg total) by mouth 2 (two) times daily.     ezetimibe 10 mg tablet  Commonly known as:  ZETIA  TAKE ONE BY MOUTH EVERY DAY.     finasteride 5 mg tablet  Commonly known as:  PROSCAR  Take 1 tablet (5 mg total) by mouth once daily.     insulin glargine 100 unit/mL (3 mL) Inpn pen  Commonly known as:  LANTUS SOLOSTAR U-100 INSULIN  Inject 40 Units into the skin once daily.     lancets Misc  Commonly known as:  ACCU-CHEK FASTCLIX LANCING DEV  Test 2-3 times daily     MEGARED PLANT-OMEGA-3 ORAL  Take 1 capsule by mouth once daily.     montelukast 10 mg tablet  Commonly known as:  SINGULAIR  TAKE ONE TABLET BY MOUTH EVERY DAY.     NIACIN FLUSH FREE ORAL  Take 500 mg by mouth once daily.     OSTEO BI-FLEX ORAL  Take 1 tablet by mouth once daily.     pen needle, diabetic 31 gauge x 3/16" Ndle  Commonly known as:  BD ULTRA-FINE MINI PEN NEEDLE  USE AS DIRECTED     PROAIR HFA 90 mcg/actuation inhaler  Generic drug:  albuterol  Inhale 2 puffs into the lungs every 6 (six) hours.     saw palmetto 80 MG capsule  Take 450 mg by mouth 2 (two) times daily.     sildenafil 20 mg Tab  Commonly known as:  REVATIO  Take 2 tablets by mouth one hour prior to intercourse. Max " two per 24 hours     SYNTHROID 137 MCG Tab tablet  Generic drug:  levothyroxine  TAKE 2 TABLETS BY MOUTH BEFORE BREAKFAST.     triamcinolone acetonide 0.025% 0.025 % cream  Commonly known as:  KENALOG  APPLY TO RASH TWO TIMES A DAY        STOP taking these medications    famotidine 40 MG tablet  Commonly known as:  PEPCID     hydroCHLOROthiazide 12.5 MG Tab  Commonly known as:  HYDRODIURIL     oxyCODONE-acetaminophen 5-325 mg per tablet  Commonly known as:  PERCOCET     predniSONE 20 MG tablet  Commonly known as:  DELTASONE     UNABLE TO FIND        ASK your doctor about these medications    amlodipine-valsartan  mg per tablet  Commonly known as:  EXFORGE  Take 1 tablet by mouth once daily.     blood sugar diagnostic Strp  Commonly known as:  BLOOD GLUCOSE TEST  1 strip by Misc.(Non-Drug; Combo Route) route 3 (three) times daily. accu-chek lux plus          Time spent on the discharge of patient: 30 minutes    Bear De Luna MD  Cardiothoracic Surgery  [facility] -

## 2018-11-08 NOTE — PLAN OF CARE
Problem: Patient Care Overview  Goal: Plan of Care Review  Outcome: Ongoing (interventions implemented as appropriate)  Pt is on nc 2l/m; tolerates txs well; pt was encouraged to use the incentive spirometer.

## 2018-11-09 ENCOUNTER — TELEPHONE (OUTPATIENT)
Dept: CARDIOLOGY | Facility: CLINIC | Age: 73
End: 2018-11-09

## 2018-11-09 ENCOUNTER — TELEPHONE (OUTPATIENT)
Dept: INTERNAL MEDICINE | Facility: CLINIC | Age: 73
End: 2018-11-09

## 2018-11-09 ENCOUNTER — TELEPHONE (OUTPATIENT)
Dept: CARDIOTHORACIC SURGERY | Facility: CLINIC | Age: 73
End: 2018-11-09

## 2018-11-09 RX ORDER — ATORVASTATIN CALCIUM 80 MG/1
80 TABLET, FILM COATED ORAL NIGHTLY
Qty: 90 TABLET | Refills: 3 | Status: CANCELLED | OUTPATIENT
Start: 2018-11-09 | End: 2019-11-09

## 2018-11-09 NOTE — TELEPHONE ENCOUNTER
11/09 Rtc and appt made per discharge instructions. Cm    ----- Message from Agata Brooks sent at 11/9/2018 11:14 AM CST -----  Contact: wife  Please call pt wife @ [phone] regarding hospital follow up appt for next week.

## 2018-11-09 NOTE — TELEPHONE ENCOUNTER
----- Message from Ledy Obregon sent at 11/9/2018  2:42 PM CST -----  Contact: pt wife  Calling in regards to left a message already and still waiting on a callback and please advise [phone]

## 2018-11-09 NOTE — TELEPHONE ENCOUNTER
Called and spoke to pt's wife Elinor, [phone] - I scheduled pt an appt to see Dr De Luna in clinic on Wed Nov 21st - per Elinor's request at 080 - I restated the appt location / floor / date / and time back - Elinor stated understanding of all - sm-c lpn    ----- Message from Lenora Robles sent at 11/9/2018 11:18 AM CST -----  Contact: wife(Elinor)[phone]  Would like to consult with nurse regarding appt on 11-21, please call back at [phone]. Thanks/ar

## 2018-11-09 NOTE — TELEPHONE ENCOUNTER
11/12/18 Left v/m that Dr Gallardo wanted Mr Pretty to start lipitor 20 mg a day. I sent the rx for Dr Gallardo to sign. Should be ready by the end of today. Call me if needed. Sent to [facility] pharmacy. Cm     11/09 I rtc and wife tells me someone was to call in a med for him to start today, ? Statin ?. She has checked with the pharmacy 3 times and no med has been done. She tells me his zetia was stopped still taking niacin. I did not find the information on the statin. Told her I would send a note to Dr Gallardo and it maybe Monday before I get back with her. She agreed.     ----- Message from Ken Morales sent at 11/9/2018  2:48 PM CST -----  Contact: wife   Returning call in reference to medication that was supposed to be called in today.         ..  ..[phone] (home)      [facility] - [place], [place] - [address] [address]  [address] [address] [address]  Phone: [phone] Fax: [phone]

## 2018-11-09 NOTE — TELEPHONE ENCOUNTER
Spoke with pt wife pt has never had issues before with statins, both pt and spouse feel like he never was on statin before.   FYI  Pt medication was adjusted by    Pt is no longer taking:   HCTz  Amlodipine   And is now taking Metoprolol 50 mg  Pt is also taking lasix 14 days and potassium. Plavix was also added.   All information was give for new medication pt pharmacy choice is [facility] this was updated in system. Pt wife repeated medication instructions.  Pt wife decline scheduling appointment at this time says she will call back.

## 2018-11-09 NOTE — TELEPHONE ENCOUNTER
----- Message from Aster Petty PA-C sent at 11/9/2018  9:06 AM CST -----  Please phone patient. Has he had any issues with statins in the past?     Needs to be on statin given CAD/recent CABG. Find preferred pharmacy and send prescription request to me for atorvastatin 80 mg nightly    He can hold Zetia for now. Ok to continue Niacin. Needs f/u with me in 7-10 days    Thanks

## 2018-11-09 NOTE — TELEPHONE ENCOUNTER
----- Message from Asetr Petty PA-C sent at 11/9/2018  3:56 PM CST -----  Please phone patient again, I want to clear atorvastatin by Dr. Gallardo given his history of ? Liver issues      He can continue zetia for now

## 2018-11-09 NOTE — TELEPHONE ENCOUNTER
----- Message from Agata Brooks sent at 11/9/2018 11:10 AM CST -----  Contact: wife  Please call pt wife @ [phone] appt, wife need to discuss with nurse, states pt had open heart surgery on Monday, states pt need to be seen next week.

## 2018-11-12 ENCOUNTER — OFFICE VISIT (OUTPATIENT)
Dept: INTERNAL MEDICINE | Facility: CLINIC | Age: 73
End: 2018-11-12
Payer: MEDICARE

## 2018-11-12 VITALS
BODY MASS INDEX: 37.34 KG/M2 | WEIGHT: 246.38 LBS | HEIGHT: 68 IN | OXYGEN SATURATION: 96 % | SYSTOLIC BLOOD PRESSURE: 128 MMHG | HEART RATE: 99 BPM | TEMPERATURE: 99 F | DIASTOLIC BLOOD PRESSURE: 76 MMHG

## 2018-11-12 DIAGNOSIS — I10 ESSENTIAL HYPERTENSION: ICD-10-CM

## 2018-11-12 DIAGNOSIS — E11.9 TYPE 2 DIABETES MELLITUS WITHOUT COMPLICATION, WITHOUT LONG-TERM CURRENT USE OF INSULIN: ICD-10-CM

## 2018-11-12 DIAGNOSIS — I25.10 CORONARY ARTERY DISEASE INVOLVING NATIVE CORONARY ARTERY OF NATIVE HEART WITHOUT ANGINA PECTORIS: Primary | ICD-10-CM

## 2018-11-12 PROCEDURE — 3078F DIAST BP <80 MM HG: CPT | Mod: CPTII,S$GLB,, | Performed by: FAMILY MEDICINE

## 2018-11-12 PROCEDURE — 99999 PR PBB SHADOW E&M-EST. PATIENT-LVL III: CPT | Mod: PBBFAC,,, | Performed by: FAMILY MEDICINE

## 2018-11-12 PROCEDURE — 1101F PT FALLS ASSESS-DOCD LE1/YR: CPT | Mod: CPTII,S$GLB,, | Performed by: FAMILY MEDICINE

## 2018-11-12 PROCEDURE — 3044F HG A1C LEVEL LT 7.0%: CPT | Mod: CPTII,S$GLB,, | Performed by: FAMILY MEDICINE

## 2018-11-12 PROCEDURE — 99214 OFFICE O/P EST MOD 30 MIN: CPT | Mod: S$GLB,,, | Performed by: FAMILY MEDICINE

## 2018-11-12 PROCEDURE — 3074F SYST BP LT 130 MM HG: CPT | Mod: CPTII,S$GLB,, | Performed by: FAMILY MEDICINE

## 2018-11-12 RX ORDER — ASCORBIC ACID 1000 MG
175 TABLET ORAL DAILY
COMMUNITY
End: 2020-03-09

## 2018-11-12 NOTE — PROGRESS NOTES
Subjective:       Patient ID: Erendira Pretty is a 73 y.o. male.    Chief Complaint: Multiple issues see below    HPIhosp f/u s/p cabg reviewd med changes: of hctz (lasix K for 14 days) , off amlod/valc, aciphex and proair;recovering well  Past Medical History:   Diagnosis Date    Aortic stenosis     Asthma     BPH (benign prostatic hyperplasia)     CAD (coronary artery disease)     40% lesion 2002;lazo    Cirrhosis     Claudication 4/9/2014    Colon polyp     ED (erectile dysfunction)     Encounter for blood transfusion     Ex-smoker     Hearing loss NEC     Hepatitis C     Cured;reji brown 2015    Hyperlipidemia     Hypertension     Hypothyroid     s/p tx graves    DELONTE on CPAP     Osteoarthritis     PVD (peripheral vascular disease)     Type 2 diabetes mellitus      Past Surgical History:   Procedure Laterality Date    CARDIAC CATHETERIZATION      CARDIAC SURGERY      CARPAL TUNNEL RELEASE Left     CATARACT EXTRACTION W/ INTRAOCULAR LENS  IMPLANT, BILATERAL  2008    COLONOSCOPY  8/2013    COLONOSCOPY N/A 5/30/2016    Procedure: COLONOSCOPY;  Surgeon: Anna Tomas MD;  Location: [facility];  Service: Endoscopy;  Laterality: N/A;    COLONOSCOPY N/A 5/30/2016    Performed by Anna Tomas MD at [facility]    COLONOSCOPY N/A 8/23/2013    Performed by Nikhil Watson MD at [facility]    CORONARY ARTERY BYPASS GRAFT (CABG) N/A 11/5/2018    Procedure: CORONARY ARTERY BYPASS GRAFT (CABG);  Surgeon: Bear De Luna MD;  Location: [facility] OR;  Service: Cardiothoracic;  Laterality: N/A;  TWO VESSEL BYPASS WITH AORTOTOMY AND EXCISION OF ATHEROSCLEROTIC PLAQUE    CORONARY ARTERY BYPASS GRAFT (CABG) N/A 11/5/2018    [facility]formed by Bear De [facility] at [facility]    ECHOCARDIOGRAM,TRANSESOPHAGEAL N/A 11/5/2018    [facility] by Bear [facility] at [facility]    ELBOW BURSA SURGERY Right 2010    ENDOSCOPIC HARVEST OF VEIN Left 11/5/2018    Procedure: SURGICAL PROCUREMENT, VEIN, ENDOSCOPIC;  Surgeon:  Bear De Luna MD;  Location: [facility] OR;  Service: Cardiothoracic;  Laterality: Left;    ESOPHAGOGASTRODUODENOSCOPY (EGD) N/A 2017    Performed by Anna Tomas MD at [facility]    ESOPHAGOGASTRODUODENOSCOPY (EGD) N/A 2016    Performed by Anna Tomas MD at [facility]    EYE SURGERY      KNEE SURGERY Right     RECTAL SURGERY      SURGICAL PROCUREMENT, VEIN, ENDOSCOPIC Left 2018    Performed by Bear De Luna MD at [facility]    TRANSURETHRAL RESECTION OF PROSTATE (TURP) WITHOUT USE OF LASER N/A 2018    Procedure: TURP, WITHOUT USING LASER;  Surgeon: Cooper Cordova IV, MD;  Location: [facility] OR;  Service: Urology;  Laterality: N/A;    TRIGGER FINGER RELEASE Left     TURP, WITHOUT USING LASER N/A 2018    Performed by Cooper Cordova IV, MD at [facility] OR     Family History   Problem Relation Age of Onset    Heart disease Mother     Heart disease Father     Heart disease Brother     Colon cancer Neg Hx      Social History     Socioeconomic History    Marital status:      Spouse name: YAEL    Number of children: 2    Years of education: None    Highest education level: None   Social Needs    Financial resource strain: None    Food insecurity - worry: None    Food insecurity - inability: None    Transportation needs - medical: None    Transportation needs - non-medical: None   Occupational History    None   Tobacco Use    Smoking status: Former Smoker     Packs/day: 0.50     Years: 4.00     Pack years: 2.00     Last attempt to quit: 1972     Years since quittin.4    Smokeless tobacco: Former User     Types: Chew     Quit date: 1976   Substance and Sexual Activity    Alcohol use: Yes     Alcohol/week: 1.2 oz     Types: 2 Cans of beer per week     Comment: daily  No alcohol prior to surgery    Drug use: No    Sexual activity: Yes     Partners: Female   Other Topics Concern    None   Social History Narrative    . Lives with spouse. Has 2  children. 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        Review of Systems  no cp sob  Objective:     wife here   Physical Exam   Constitutional: He appears well-developed and well-nourished.   Cardiovascular: Normal rate and regular rhythm.   Pulmonary/Chest: Effort normal and breath sounds normal.     sternotomy site no redness  Assessment:       1. Coronary artery disease involving native coronary artery of native heart without angina pectoris    2. Type 2 diabetes mellitus without complication, without long-term current use of insulin    3. Essential hypertension        Plan:       *see prior note at f/u  Note due egd for f/u varices/barretts susp. 6/19**    F/u card etc whendue  D/wd dig htn/dm program. He wants to work thru post op period . Will be open to this at f/u  F/u 1/19 as sched

## 2018-11-13 RX ORDER — ATORVASTATIN CALCIUM 20 MG/1
20 TABLET, FILM COATED ORAL DAILY
Qty: 90 TABLET | Refills: 3 | Status: SHIPPED | OUTPATIENT
Start: 2018-11-13 | End: 2019-01-16 | Stop reason: SINTOL

## 2018-11-13 NOTE — ANESTHESIA POSTPROCEDURE EVALUATION
"Anesthesia Post Evaluation    Patient: Erendira Pretty    Procedure(s) Performed: Procedure(s) (LRB):  CORONARY ARTERY BYPASS GRAFT (CABG) (N/A)  SURGICAL PROCUREMENT, VEIN, ENDOSCOPIC (Left)  ECHOCARDIOGRAM,TRANSESOPHAGEAL (N/A)    Final Anesthesia Type: general  Patient location during evaluation: ICU  Patient participation: No - Unable to Participate, Intubation  Level of consciousness: sedated  Post-procedure vital signs: reviewed and stable  Pain management: adequate  Airway patency: patent    Anesthetic complications: no      Cardiovascular status: stable  Respiratory status: ETT and ventilator          Visit Vitals  BP (!) 107/54   Pulse 83   Temp 37.1 °C (98.7 °F)   Resp 18   Ht 5' 8" (1.727 m)   Wt 114.6 kg (252 lb 10.4 oz)   SpO2 (!) 94%   BMI 38.41 kg/m²       Pain/Natalie Score: No Data Recorded      "

## 2018-11-14 ENCOUNTER — OFFICE VISIT (OUTPATIENT)
Dept: CARDIOLOGY | Facility: CLINIC | Age: 73
End: 2018-11-14
Payer: MEDICARE

## 2018-11-14 VITALS
HEIGHT: 68 IN | WEIGHT: 248.88 LBS | BODY MASS INDEX: 37.72 KG/M2 | HEART RATE: 84 BPM | DIASTOLIC BLOOD PRESSURE: 68 MMHG | SYSTOLIC BLOOD PRESSURE: 130 MMHG

## 2018-11-14 DIAGNOSIS — G47.33 OSA ON CPAP: Chronic | ICD-10-CM

## 2018-11-14 DIAGNOSIS — Z95.1 S/P CABG X 2: ICD-10-CM

## 2018-11-14 DIAGNOSIS — I73.9 PVD (PERIPHERAL VASCULAR DISEASE): ICD-10-CM

## 2018-11-14 DIAGNOSIS — I25.10 CORONARY ARTERY DISEASE INVOLVING NATIVE CORONARY ARTERY OF NATIVE HEART WITHOUT ANGINA PECTORIS: ICD-10-CM

## 2018-11-14 DIAGNOSIS — I10 ESSENTIAL HYPERTENSION: ICD-10-CM

## 2018-11-14 DIAGNOSIS — I27.20 PULMONARY HYPERTENSION: Primary | ICD-10-CM

## 2018-11-14 PROCEDURE — 99999 PR PBB SHADOW E&M-EST. PATIENT-LVL III: CPT | Mod: PBBFAC,,, | Performed by: INTERNAL MEDICINE

## 2018-11-14 PROCEDURE — 1101F PT FALLS ASSESS-DOCD LE1/YR: CPT | Mod: CPTII,S$GLB,, | Performed by: INTERNAL MEDICINE

## 2018-11-14 PROCEDURE — 3078F DIAST BP <80 MM HG: CPT | Mod: CPTII,S$GLB,, | Performed by: INTERNAL MEDICINE

## 2018-11-14 PROCEDURE — 3075F SYST BP GE 130 - 139MM HG: CPT | Mod: CPTII,S$GLB,, | Performed by: INTERNAL MEDICINE

## 2018-11-14 PROCEDURE — 99214 OFFICE O/P EST MOD 30 MIN: CPT | Mod: S$GLB,,, | Performed by: INTERNAL MEDICINE

## 2018-11-14 RX ORDER — FUROSEMIDE 40 MG/1
40 TABLET ORAL DAILY
Qty: 40 TABLET | Refills: 11 | Status: SHIPPED | OUTPATIENT
Start: 2018-11-14 | End: 2019-04-17

## 2018-11-14 NOTE — PROGRESS NOTES
Subjective:   Patient ID:  Erendira Pretty is a 73 y.o. male who presents for follow-up of Hospital Follow Up  Pt is s/p CABG -2 V.Patient denies CP, angina or anginal equivalent.Pt with cough more when lying down.    Hypertension   This is a chronic problem. The current episode started more than 1 year ago. The problem has been gradually improving since onset. The problem is controlled. Pertinent negatives include no chest pain, palpitations or shortness of breath. Past treatments include beta blockers. The current treatment provides moderate improvement. Compliance problems include exercise.    Hyperlipidemia   This is a chronic problem. The current episode started more than 1 year ago. The problem is controlled. Recent lipid tests were reviewed and are variable. Pertinent negatives include no chest pain or shortness of breath. Current antihyperlipidemic treatment includes ezetimibe, statins and nicotinic acid. The current treatment provides moderate improvement of lipids. Compliance problems include adherence to exercise.    Coronary Artery Disease   Presents for follow-up visit. Pertinent negatives include no chest pain, chest pressure, chest tightness, dizziness, leg swelling, muscle weakness, palpitations, shortness of breath or weight gain. Risk factors include hyperlipidemia. The symptoms have been stable. Compliance with diet is variable. Compliance with exercise is variable. Compliance with medications is good.       Review of Systems   Constitution: Negative. Negative for weight gain.   HENT: Negative.    Eyes: Negative.    Cardiovascular: Negative.  Negative for chest pain, leg swelling and palpitations.   Respiratory: Negative.  Negative for chest tightness and shortness of breath.    Endocrine: Negative.    Hematologic/Lymphatic: Negative.    Skin: Negative.    Musculoskeletal: Negative for muscle weakness.   Gastrointestinal: Negative.    Genitourinary: Negative.    Neurological: Negative.  Negative  for dizziness.   Psychiatric/Behavioral: Negative.    Allergic/Immunologic: Negative.      Family History   Problem Relation Age of Onset    Heart disease Mother     Heart disease Father     Heart disease Brother     Colon cancer Neg Hx      Past Medical History:   Diagnosis Date    Aortic stenosis     Asthma     BPH (benign prostatic hyperplasia)     CAD (coronary artery disease)     40% lesion 2002;[facility]azo    Cirrhosis     Claudication 4/9/2014    Colon polyp     ED (erectile dysfunction)     Encounter for blood transfusion     Ex-smoker     Hearing loss NEC     Hepatitis C     Cured;reji brown 2015    Hyperlipidemia     Hypertension     Hypothyroid     s/p tx graves    DELONTE on CPAP     Osteoarthritis     PVD (peripheral vascular disease)     Type 2 diabetes mellitus      Current Outpatient Medications on File Prior to Visit   Medication Sig Dispense Refill    albuterol-ipratropium (DUO-NEB) 2.5 mg-0.5 mg/3 mL nebulizer solution Take 3 mLs by nebulization 2 (two) times daily. Rescue 120 vial 5    aspirin (ECOTRIN) 81 MG EC tablet Take 1 tablet (81 mg total) by mouth once daily.  0    blood sugar diagnostic (BLOOD GLUCOSE TEST) Strp 1 strip by Misc.(Non-Drug; Combo Route) route 3 (three) times daily. accu-chek lux plus 100 strip 11    budesonide-formoterol 160-4.5 mcg (SYMBICORT) 160-4.5 mcg/actuation HFAA INHALE 2 PUFFS INTO THE LUNGS TWO TIMES A DAY (Patient taking differently: Inhale 2 puffs into the lungs every 12 (twelve) hours. INHALE 2 PUFFS INTO THE LUNGS TWO TIMES A DAY) 10.2 g 11    clopidogrel (PLAVIX) 75 mg tablet Take 1 tablet (75 mg total) by mouth once daily. 30 tablet 11    docusate sodium (COLACE) 100 MG capsule Take 1 capsule (100 mg total) by mouth 2 (two) times daily. 60 capsule 0    ezetimibe (ZETIA) 10 mg tablet TAKE ONE BY MOUTH EVERY DAY. 90 tablet 3    finasteride (PROSCAR) 5 mg tablet Take 1 tablet (5 mg total) by mouth once daily. 30 tablet 11     "FLUAD [id number], 65 YR UP,,PF, 45 mcg (15 mcg x 3)/0.5 mL Syrg       furosemide (LASIX) 40 MG tablet Take 1 tablet (40 mg total) by mouth once daily. for 14 days 14 tablet 0    GLUCOSAMINE HCL/CHONDR ROSARIO A NA (OSTEO BI-FLEX ORAL) Take 1 tablet by mouth 2 (two) times daily.       HUMALOG KWIKPEN 100 unit/mL InPn pen INJECT 3-4 UNITS INTO THE SKIN THREE TIMES DAILY BEFORE MEALS. (Patient taking differently: Currently on 10 units hs) 3 mL 11    insulin glargine (LANTUS SOLOSTAR) 100 unit/mL (3 mL) InPn pen Inject 40 Units into the skin once daily. 5 Syringe 6    lancets (ACCU-CHEK FASTCLIX) Misc Test 2-3 times daily 200 each 3    metoprolol succinate (TOPROL-XL) 50 MG 24 hr tablet Take 1 tablet (50 mg total) by mouth once daily. 60 tablet 11    milk thistle 175 mg tablet Take 175 mg by mouth once daily.      mometasone (NASONEX) 50 mcg/actuation nasal spray 2 sprays by Nasal route once daily. (Patient taking differently: 2 sprays by Nasal route daily as needed. ) 17 g 12    montelukast (SINGULAIR) 10 mg tablet TAKE ONE TABLET BY MOUTH EVERY DAY. 30 tablet 11    MV,CA,IRON,MIN/FA/PHYTOSTEROL (CENTRUM SPECIALIST HEART ORAL) Take 1 tablet by mouth once daily.       niacin, inositol niacinate, (NIACIN FLUSH FREE ORAL) Take 500 mg by mouth once daily.      OMEGA-3 FATTY ACIDS/DHA/EPA (MEGARED PLANT-OMEGA-3 ORAL) Take 1 capsule by mouth once daily.      pen needle, diabetic (BD INSULIN PEN NEEDLE UF MINI) 31 gauge x 3/16" Ndle USE AS DIRECTED 100 each 11    polyethylene glycol (GLYCOLAX) 17 gram/dose powder Take 17 g ( 1 capful) by mouth once daily. 527 g 0    potassium chloride SA (K-DUR,KLOR-CON) 20 MEQ tablet Take 1 tablet (20 mEq total) by mouth once daily. for 14 days 28 tablet 0    SYNTHROID 137 mcg Tab tablet TAKE 2 TABLETS BY MOUTH BEFORE BREAKFAST. 60 tablet 5    atorvastatin (LIPITOR) 20 MG tablet Take 1 tablet (20 mg total) by mouth once daily. 90 tablet 3     No current facility-administered " medications on file prior to visit.      Review of patient's allergies indicates:   Allergen Reactions    Iodinated contrast- oral and iv dye Hives     Tachycardia    Omeprazole Hives and Itching    Prilosec [omeprazole magnesium] Hives and Itching       Objective:     Physical Exam   Constitutional: He is oriented to person, place, and time. He appears well-developed and well-nourished.   HENT:   Head: Normocephalic and atraumatic.   Eyes: Conjunctivae are normal. Pupils are equal, round, and reactive to light.   Neck: Normal range of motion. Neck supple.   Cardiovascular: Normal rate, regular rhythm, normal heart sounds and intact distal pulses.   Pulmonary/Chest: Effort normal and breath sounds normal.   Abdominal: Soft. Bowel sounds are normal.   Neurological: He is alert and oriented to person, place, and time.   Skin: Skin is warm and dry.   Psychiatric: He has a normal mood and affect.   Nursing note and vitals reviewed.      Assessment:     1. Pulmonary hypertension    2. Essential hypertension    3. Coronary artery disease involving native coronary artery of native heart without angina pectoris    4. PVD (peripheral vascular disease)    5. S/P CABG x 2    6. DELONTE on CPAP        Plan:     Pulmonary hypertension    Essential hypertension    Coronary artery disease involving native coronary artery of native heart without angina pectoris    PVD (peripheral vascular disease)    S/P CABG x 2    DELONTE on CPAP    increase lasix bid x 3 days  echo  Continue statin, niacin, zetia-hlp  Continue metoprolol-htn  Continue asa- cad

## 2018-11-19 NOTE — PLAN OF CARE
D/C 11/8/18.     11/19/18 0752   Final Note   Assessment Type Final Discharge Note   Anticipated Discharge Disposition Home-Health  ([facility] )   Hospital Follow Up  Appt(s) scheduled? Yes   Discharge plans and expectations educations in teach back method with documentation complete? Yes   Right Care Referral Info   Post Acute Recommendation Home-care  ([facility] )

## 2018-11-21 ENCOUNTER — OFFICE VISIT (OUTPATIENT)
Dept: CARDIOTHORACIC SURGERY | Facility: CLINIC | Age: 73
End: 2018-11-21
Payer: MEDICARE

## 2018-11-21 VITALS
WEIGHT: 247.56 LBS | HEART RATE: 81 BPM | DIASTOLIC BLOOD PRESSURE: 80 MMHG | SYSTOLIC BLOOD PRESSURE: 128 MMHG | BODY MASS INDEX: 37.64 KG/M2 | TEMPERATURE: 98 F

## 2018-11-21 DIAGNOSIS — Z95.1 S/P CABG X 2: Primary | ICD-10-CM

## 2018-11-21 PROCEDURE — 99024 POSTOP FOLLOW-UP VISIT: CPT | Mod: S$GLB,,, | Performed by: THORACIC SURGERY (CARDIOTHORACIC VASCULAR SURGERY)

## 2018-11-21 PROCEDURE — 3079F DIAST BP 80-89 MM HG: CPT | Mod: CPTII,S$GLB,, | Performed by: THORACIC SURGERY (CARDIOTHORACIC VASCULAR SURGERY)

## 2018-11-21 PROCEDURE — 3074F SYST BP LT 130 MM HG: CPT | Mod: CPTII,S$GLB,, | Performed by: THORACIC SURGERY (CARDIOTHORACIC VASCULAR SURGERY)

## 2018-11-21 PROCEDURE — 1101F PT FALLS ASSESS-DOCD LE1/YR: CPT | Mod: CPTII,S$GLB,, | Performed by: THORACIC SURGERY (CARDIOTHORACIC VASCULAR SURGERY)

## 2018-11-21 PROCEDURE — 99999 PR PBB SHADOW E&M-EST. PATIENT-LVL III: CPT | Mod: PBBFAC,,, | Performed by: THORACIC SURGERY (CARDIOTHORACIC VASCULAR SURGERY)

## 2018-11-21 RX ORDER — RABEPRAZOLE SODIUM 20 MG/1
20 TABLET, DELAYED RELEASE ORAL DAILY
COMMUNITY
End: 2020-03-30

## 2018-11-23 RX ORDER — LANCETS
EACH MISCELLANEOUS
Qty: 102 EACH | Refills: 5 | Status: SHIPPED | OUTPATIENT
Start: 2018-11-23 | End: 2018-11-28 | Stop reason: SDUPTHER

## 2018-11-23 RX ORDER — LEVOTHYROXINE SODIUM 137 UG/1
TABLET ORAL
Qty: 60 TABLET | Refills: 5 | Status: SHIPPED | OUTPATIENT
Start: 2018-11-23 | End: 2019-05-24 | Stop reason: SDUPTHER

## 2018-11-28 RX ORDER — LANCETS
EACH MISCELLANEOUS
Qty: 102 EACH | Refills: 5 | Status: SHIPPED | OUTPATIENT
Start: 2018-11-28 | End: 2024-01-29

## 2018-11-30 ENCOUNTER — OFFICE VISIT (OUTPATIENT)
Dept: PULMONOLOGY | Facility: CLINIC | Age: 73
End: 2018-11-30
Payer: MEDICARE

## 2018-11-30 ENCOUNTER — HOSPITAL ENCOUNTER (OUTPATIENT)
Dept: RADIOLOGY | Facility: HOSPITAL | Age: 73
Discharge: HOME | End: 2018-11-30
Attending: INTERNAL MEDICINE
Payer: MEDICARE

## 2018-11-30 VITALS
BODY MASS INDEX: 36.89 KG/M2 | WEIGHT: 243.38 LBS | OXYGEN SATURATION: 99 % | HEART RATE: 96 BPM | SYSTOLIC BLOOD PRESSURE: 118 MMHG | DIASTOLIC BLOOD PRESSURE: 76 MMHG | HEIGHT: 68 IN | RESPIRATION RATE: 18 BRPM

## 2018-11-30 DIAGNOSIS — J20.9 BRONCHITIS, CHRONIC OBSTRUCTIVE W ACUTE BRONCHITIS: Primary | ICD-10-CM

## 2018-11-30 DIAGNOSIS — J44.0 BRONCHITIS, CHRONIC OBSTRUCTIVE W ACUTE BRONCHITIS: ICD-10-CM

## 2018-11-30 DIAGNOSIS — G47.33 OSA ON CPAP: Chronic | ICD-10-CM

## 2018-11-30 DIAGNOSIS — J20.9 BRONCHITIS, CHRONIC OBSTRUCTIVE W ACUTE BRONCHITIS: ICD-10-CM

## 2018-11-30 DIAGNOSIS — J44.0 BRONCHITIS, CHRONIC OBSTRUCTIVE W ACUTE BRONCHITIS: Primary | ICD-10-CM

## 2018-11-30 DIAGNOSIS — R49.0 HOARSE VOICE QUALITY: ICD-10-CM

## 2018-11-30 DIAGNOSIS — J42 CHRONIC WHEEZY BRONCHITIS: ICD-10-CM

## 2018-11-30 PROCEDURE — 3074F SYST BP LT 130 MM HG: CPT | Mod: CPTII,S$GLB,, | Performed by: INTERNAL MEDICINE

## 2018-11-30 PROCEDURE — 71046 X-RAY EXAM CHEST 2 VIEWS: CPT | Mod: 26,,, | Performed by: RADIOLOGY

## 2018-11-30 PROCEDURE — 1101F PT FALLS ASSESS-DOCD LE1/YR: CPT | Mod: CPTII,S$GLB,, | Performed by: INTERNAL MEDICINE

## 2018-11-30 PROCEDURE — 3078F DIAST BP <80 MM HG: CPT | Mod: CPTII,S$GLB,, | Performed by: INTERNAL MEDICINE

## 2018-11-30 PROCEDURE — 71046 X-RAY EXAM CHEST 2 VIEWS: CPT | Mod: TC,FY,PO

## 2018-11-30 PROCEDURE — 99999 PR PBB SHADOW E&M-EST. PATIENT-LVL IV: CPT | Mod: PBBFAC,,, | Performed by: INTERNAL MEDICINE

## 2018-11-30 PROCEDURE — 99214 OFFICE O/P EST MOD 30 MIN: CPT | Mod: S$GLB,,, | Performed by: INTERNAL MEDICINE

## 2018-11-30 RX ORDER — METHYLPREDNISOLONE 4 MG/1
TABLET ORAL
Qty: 1 PACKAGE | Refills: 1 | Status: SHIPPED | OUTPATIENT
Start: 2018-11-30 | End: 2018-12-20 | Stop reason: ALTCHOICE

## 2018-11-30 RX ORDER — AZITHROMYCIN 250 MG/1
TABLET, FILM COATED ORAL
Qty: 6 TABLET | Refills: 1 | Status: SHIPPED | OUTPATIENT
Start: 2018-11-30 | End: 2018-12-05 | Stop reason: ALTCHOICE

## 2018-11-30 NOTE — PROGRESS NOTES
"Subjective:       Patient ID: Erendira Pretty is a 73 y.o. male.    Chief Complaint: Chronic wheezy bronchitis    Mr Maria De Jesus Krishna is 73 years old  Recent open heart surgery .  Discharge summary reviewed;  HPI:   The patient is a 73-year-old male who was worked up for complaints of dyspnea on exertion.  The patient was in his usual state of health until about a year ago when he noticed dyspnea on exertion on moderate to severe exertion.  The patient's symptoms have been progressively getting worse.  The patient denies orthopnea or PND.  He denies palpitations or dizziness.  The patient has mild ankle swelling right greater than left. He denies chest pain. He denies fever or chills.  But the patient has a chronic cough.  Cardiac catheterization done today shows critical left main stenosis.     Procedure(s) (LRB):  CORONARY ARTERY BYPASS GRAFT (CABG) (N/A)  SURGICAL PROCUREMENT, VEIN, ENDOSCOPIC (Left)  ECHOCARDIOGRAM,TRANSESOPHAGEAL (N/A)         Has DELONTE on CPAP and Chr wheezy bronchitis  Has Nebuliser, CPAP, Symbicort  ACT score 19. CAT score 19  [place] score 6.    Comes today cough, hoarse voce, yellow sputum, No fever.  Frustrated by Hoarse voice post op  Will refer toENT      Review of Systems   Constitutional: Negative for fatigue.   HENT: Positive for voice change and congestion.    Eyes: Negative.    Respiratory: Negative.  Negative for cough (morning cough and sputum), shortness of breath and wheezing.    Cardiovascular: Negative for leg swelling.   Genitourinary: Negative.    Endocrine: endocrine negative   Musculoskeletal: Negative.    Skin: Negative.    Gastrointestinal: Negative.    Neurological: Negative.    Psychiatric/Behavioral: Negative.        Objective:       Vitals:    11/30/18 1021   BP: 118/76   Pulse: 96   Resp: 18   SpO2: 99%   Weight: 110.4 kg (243 lb 6.2 oz)   Height: 5' 8" (1.727 m)     Physical Exam   Constitutional: He is oriented to person, place, and time. Vital signs are normal. He " appears well-developed and well-nourished. He is obese.   HENT:   Head: Normocephalic.   Nose: Nose normal.   Mouth/Throat: Oropharynx is clear and moist. Mallampati Score: III.   Neck: Normal range of motion. Neck supple.   Cardiovascular: Normal rate and regular rhythm.   Murmur heard.  Pulmonary/Chest: Normal expansion, symmetric chest wall expansion, effort normal and breath sounds normal. He has no decreased breath sounds. He has no rhonchi. He has no rales.   Abdominal: Soft. Bowel sounds are normal.   Musculoskeletal: Normal range of motion. He exhibits no edema.   Lymphadenopathy:     He has no cervical adenopathy.   Neurological: He is alert and oriented to person, place, and time. He has normal reflexes.   Skin: Skin is warm and dry.   Psychiatric: He has a normal mood and affect.   Nursing note and vitals reviewed.    Personal Diagnostic Review  CXR  Cardiac silhouette and mediastinal contours are normal.  Lungs demonstrate no acute opacity.  There is minimal blunting of the posterior left costophrenic angle, tiny effusion versus chronic pleural thickening.  Osseous structures are intact.      Impression       Tiny left pleural effusion versus chronic pleural thickening           No flowsheet data found.      Assessment:       Problem List Items Addressed This Visit     DELONTE on CPAP (Chronic)    Chronic wheezy bronchitis      Other Visit Diagnoses     Bronchitis, chronic obstructive w acute bronchitis    -  Primary    Relevant Medications    azithromycin (Z-GADIEL) 250 MG tablet    methylPREDNISolone (MEDROL DOSEPACK) 4 mg tablet    Other Relevant Orders    X-Ray Chest PA And Lateral (Completed)    Hoarse voice quality        Relevant Orders    Ambulatory Referral to ENT            Plan:       Continue Symbicort: Vortex spacer   Adherence with spacer  Continue CPAP  Zpak and medrol given  See ENT for Hoarseness: 12/3 Dr Parkinson    Follow-up in about 3 months (around 2/28/2019), or CXR, ENT referal, Download PAP,  for Download CPAP/ APAP/ TRIOLOG/ BIPA, CPAP supplies.    This note was prepared using voice recognition system and is likely to have sound alike errors that may have been overlooked even after proof reading.  Please call me with any questions    Discussed diagnosis, its evaluation, treatment and usual course. All questions answered.    Thank you for the courtesy of participating in the care of this patient    Rishi Molina MD

## 2018-11-30 NOTE — PATIENT INSTRUCTIONS
Azithromycin tablets  What is this medicine?  AZITHROMYCIN (az ith kaleigh MYE sin) is a macrolide antibiotic. It is used to treat or prevent certain kinds of bacterial infections. It will not work for colds, flu, or other viral infections.  How should I use this medicine?  Take this medicine by mouth with a full glass of water. Follow the directions on the prescription label. The tablets can be taken with food or on an empty stomach. If the medicine upsets your stomach, take it with food. Take your medicine at regular intervals. Do not take your medicine more often than directed. Take all of your medicine as directed even if you think your are better. Do not skip doses or stop your medicine early. Talk to your pediatrician regarding the use of this medicine in children. Special care may be needed.  What side effects may I notice from receiving this medicine?  Side effects that you should report to your doctor or health care professional as soon as possible:  · allergic reactions like skin rash, itching or hives, swelling of the face, lips, or tongue  · confusion, nightmares or hallucinations  · dark urine  · difficulty breathing  · hearing loss  · irregular heartbeat or chest pain  · pain or difficulty passing urine  · redness, blistering, peeling or loosening of the skin, including inside the mouth  · white patches or sores in the mouth  · yellowing of the eyes or skin  Side effects that usually do not require medical attention (report to your doctor or health care professional if they continue or are bothersome):  · diarrhea  · dizziness, drowsiness  · headache  · stomach upset or vomiting  · tooth discoloration  · vaginal irritation  What may interact with this medicine?  Do not take this medicine with any of the following medications:  · lincomycin  This medicine may also interact with the following medications:  · amiodarone  · antacids  · birth control  pills  · cyclosporine  · digoxin  · magnesium  · nelfinavir  · phenytoin  · warfarin  What if I miss a dose?  If you miss a dose, take it as soon as you can. If it is almost time for your next dose, take only that dose. Do not take double or extra doses.  Where should I keep my medicine?  Keep out of the reach of children.  Store at room temperature between 15 and 30 degrees C (59 and 86 degrees F). Throw away any unused medicine after the expiration date.  What should I tell my health care provider before I take this medicine?  They need to know if you have any of these conditions:  · kidney disease  · liver disease  · irregular heartbeat or heart disease  · an unusual or allergic reaction to azithromycin, erythromycin, other macrolide antibiotics, foods, dyes, or preservatives  · pregnant or trying to get pregnant  · breast-feeding  What should I watch for while using this medicine?  Tell your doctor or health care professional if your symptoms do not improve.  Do not treat diarrhea with over the counter products. Contact your doctor if you have diarrhea that lasts more than 2 days or if it is severe and watery.  This medicine can make you more sensitive to the sun. Keep out of the sun. If you cannot avoid being in the sun, wear protective clothing and use sunscreen. Do not use sun lamps or tanning beds/booths.  NOTE:This sheet is a summary. It may not cover all possible information. If you have questions about this medicine, talk to your doctor, pharmacist, or health care provider. Copyright© 2017 [employer]        Methylprednisolone tablets  What is this medicine?  METHYLPREDNISOLONE (meth ill pred NISS oh lone) is a corticosteroid. It is commonly used to treat inflammation of the skin, joints, lungs, and other organs. Common conditions treated include asthma, allergies, and arthritis. It is also used for other conditions, such as blood disorders and diseases of the adrenal glands.  How should I use this  medicine?  Take this medicine by mouth with a drink of water. Follow the directions on the prescription label. Take it with food or milk to avoid stomach upset. If you are taking this medicine once a day, take it in the morning. Do not take more medicine than you are told to take. Do not suddenly stop taking your medicine because you may develop a severe reaction. Your doctor will tell you how much medicine to take. If your doctor wants you to stop the medicine, the dose may be slowly lowered over time to avoid any side effects.  Talk to your pediatrician regarding the use of this medicine in children. Special care may be needed.  What side effects may I notice from receiving this medicine?  Side effects that you should report to your doctor or health care professional as soon as possible:  · allergic reactions like skin rash, itching or hives, swelling of the face, lips, or tongue  · eye pain, decreased or blurred vision, or bulging eyes  · fever, sore throat, sneezing, cough, or other signs of infection, wounds that will not heal  · increased thirst  · mental depression, mood swings, mistaken feelings of self importance or of being mistreated  · pain in hips, back, ribs, arms, shoulders, or legs  · swelling of the ankles, feet, hands  · trouble passing urine or change in the amount of urine  Side effects that usually do not require medical attention (report to your doctor or health care professional if they continue or are bothersome):  · confusion, excitement, restlessness  · headache  · nausea, vomiting  · skin problems, acne, thin and shiny skin  · weight gain  What may interact with this medicine?  Do not take this medicine with any of the following medications:  · mifepristone  This medicine may also interact with the following medications:  · tacrolimus  · vaccines  · warfarin  What if I miss a dose?  If you miss a dose, take it as soon as you can. If it is almost time for your next dose, talk to your doctor  or health care professional. You may need to miss a dose or take an extra dose. Do not take double or extra doses without advice.  Where should I keep my medicine?  Keep out of the reach of children.  Store at room temperature between 20 and 25 degrees C (68 and 77 degrees F). Throw away any unused medicine after the expiration date.  What should I tell my health care provider before I take this medicine?  They need to know if you have any of these conditions:  · Cushing's syndrome  · diabetes  · glaucoma  · heart problems or disease  · high blood pressure  · infection such as herpes, measles, tuberculosis, or chickenpox  · kidney disease  · liver disease  · mental problems  · myasthenia gravis  · osteoporosis  · seizures  · stomach ulcer or intestine disease including colitis and diverticulitis  · thyroid problem  · an unusual or allergic reaction to lactose, methylprednisolone, other medicines, foods, dyes, or preservatives  · pregnant or trying to get pregnant  · breast-feeding  What should I watch for while using this medicine?  Visit your doctor or health care professional for regular checks on your progress. If you are taking this medicine for a long time, carry an identification card with your name and address, the type and dose of your medicine, and your doctor's name and address.  The medicine may increase your risk of getting an infection. Stay away from people who are sick. Tell your doctor or health care professional if you are around anyone with measles or chickenpox.  If you are going to have surgery, tell your doctor or health care professional that you have taken this medicine within the last twelve months.  Ask your doctor or health care professional about your diet. You may need to lower the amount of salt you eat.  The medicine can increase your blood sugar. If you are a diabetic check with your doctor if you need help adjusting the dose of your diabetic medicine.  NOTE:This sheet is a summary. It  may not cover all possible information. If you have questions about this medicine, talk to your doctor, pharmacist, or health care provider. Copyright© 2017 [employer]

## 2018-12-03 ENCOUNTER — OFFICE VISIT (OUTPATIENT)
Dept: OTOLARYNGOLOGY | Facility: CLINIC | Age: 73
End: 2018-12-03
Payer: MEDICARE

## 2018-12-03 VITALS
WEIGHT: 239.44 LBS | BODY MASS INDEX: 36.4 KG/M2 | DIASTOLIC BLOOD PRESSURE: 86 MMHG | SYSTOLIC BLOOD PRESSURE: 140 MMHG | HEART RATE: 80 BPM

## 2018-12-03 DIAGNOSIS — R49.0 DYSPHONIA: Primary | ICD-10-CM

## 2018-12-03 DIAGNOSIS — J44.89 ASTHMA WITH COPD: ICD-10-CM

## 2018-12-03 DIAGNOSIS — J33.9 NASAL POLYPOSIS: ICD-10-CM

## 2018-12-03 DIAGNOSIS — J30.89 NON-SEASONAL ALLERGIC RHINITIS, UNSPECIFIED TRIGGER: ICD-10-CM

## 2018-12-03 PROCEDURE — 3079F DIAST BP 80-89 MM HG: CPT | Mod: CPTII,S$GLB,, | Performed by: OTOLARYNGOLOGY

## 2018-12-03 PROCEDURE — 1101F PT FALLS ASSESS-DOCD LE1/YR: CPT | Mod: CPTII,S$GLB,, | Performed by: OTOLARYNGOLOGY

## 2018-12-03 PROCEDURE — 31575 DIAGNOSTIC LARYNGOSCOPY: CPT | Mod: S$GLB,,, | Performed by: OTOLARYNGOLOGY

## 2018-12-03 PROCEDURE — 99999 PR PBB SHADOW E&M-EST. PATIENT-LVL IV: CPT | Mod: PBBFAC,,, | Performed by: OTOLARYNGOLOGY

## 2018-12-03 PROCEDURE — 99214 OFFICE O/P EST MOD 30 MIN: CPT | Mod: 25,S$GLB,, | Performed by: OTOLARYNGOLOGY

## 2018-12-03 PROCEDURE — 3077F SYST BP >= 140 MM HG: CPT | Mod: CPTII,S$GLB,, | Performed by: OTOLARYNGOLOGY

## 2018-12-03 RX ORDER — MONTELUKAST SODIUM 10 MG/1
10 TABLET ORAL DAILY
Qty: 30 TABLET | Refills: 11 | Status: SHIPPED | OUTPATIENT
Start: 2018-12-03 | End: 2019-12-17 | Stop reason: SDUPTHER

## 2018-12-03 RX ORDER — LEVOCETIRIZINE DIHYDROCHLORIDE 5 MG/1
5 TABLET, FILM COATED ORAL NIGHTLY
Qty: 30 TABLET | Refills: 11 | Status: SHIPPED | OUTPATIENT
Start: 2018-12-03 | End: 2019-12-09 | Stop reason: SDUPTHER

## 2018-12-03 RX ORDER — MOMETASONE FUROATE 50 UG/1
2 SPRAY, METERED NASAL DAILY
Qty: 17 G | Refills: 12 | Status: SHIPPED | OUTPATIENT
Start: 2018-12-03 | End: 2019-01-02

## 2018-12-03 RX ORDER — AZELASTINE 1 MG/ML
2 SPRAY, METERED NASAL 2 TIMES DAILY
Qty: 30 ML | Refills: 6 | Status: SHIPPED | OUTPATIENT
Start: 2018-12-03 | End: 2020-03-09

## 2018-12-03 NOTE — PROGRESS NOTES
Referring Provider:    No referring provider defined for this encounter.  Subjective:   Patient: Erendira Pretty [id number], :1945   Visit date:12/3/2018 8:54 AM    Chief Complaint:  Hoarse and Sore Throat    HPI:  Erendira is a 73 y.o. male who I was asked to see in consultation for evaluation of chronic throat discomfort:      Patient was initially seen in 2017.  He reported significant nasal obstruction worse on the left than the right.  He also reported facial pain, pressure and discomfort.  He had had nasal polypectomy performed twice with the last one in about the year .  He had never had ALLERGY testing performed.  He reported moderate ALLERGIC symptoms of nasal congestion, nasal itchiness and rhinorrhea.  No imaging had been performed prior to seeing me.  He had been started on Augmentin shortly before his initial clinic visit and was having significant gastrointestinal issues with this.  He does have asthma.  Nasal endoscopy revealed bilateral nasal polyps with a very large polyp on the left side.  This was removed.  I placed him on levofloxacin, Nasonex twice a day and Astelin twice a day.  He was not given steroids due to his diabetes.       He return to clinic 2017.  He reports that his nasal obstruction is significantly better.  Additionally he is no longer having facial pain or pressure.  He denies significant nasal drainage.  He is having a persistent cough that is been lingering for about 2 months now.  He was on Singulair in the past but has been off of that for some time.  He is having to use his inhalers, specifically his albuterol inhaler multiple times daily.  ALLERGY testing by blood work was performed and revealed numerous class III ALLERGIES.  Erendira returned April 3, 2017.  I had directed the patient to use Nasonex, Astelin, Xyzal and Singulair.  He stopped using the nasal sprays about 1 month ago but continued oral medications.  Despite this, he reported that his  breathing through the nose had dramatically improved.  He had no facial pressure or pain.  He had minimal nasal drainage.  His cough had resolved.  Overall he was feeling very good.  Then plan at this point, was to resume Nasonex and continue with Xyzal and Singulair.      When he returned May 15, 2017, he had been using Nasonex, Xyzal and Singulair for his allergy regimen. Over the past week, he has had increased post nasal and cough.  The cough somewhat improved but the sore throat has gotten worse.  The post nasal drip has been worsening over the same period of time but may be mildly better in the past 2 days.  The sore throat is the most bothersome issue at this point and he has some difficulty swallowing.  Severity- moderate to severe. Quality- burning.  Modifying factors- none, no improvement with choraseptic spray.  Assoc ssx- nasal drainage, cough     Sept 18, 2017  He has been Xyzal regularly.  He uses Nasonex and Flonase PRN.  No pressure or pain.  No drainage. Feeling well.    December 3, 2018  Patient underwent coronary artery bypass surgery November 5, 2018.  Postoperatively, he had a fairly severe cough but this has been improving.  He has had some significant dysphonia with difficulty in projecting the voice.  He denies any pain with phonation.  He denies any significant shortness of breath.  He reports that the cough is dramatically better than a few weeks ago.  He has been using Xyzal and Singulair regularly but only using the Flonase as needed.  He discontinued the Astelin sometime ago.  He reports that he is having significant watery rhinorrhea as well as postnasal drip.  His cough is nonproductive.  He denies any significant exacerbating or relieving factors.    Review of Systems:  -     Allergic/Immunologic: is allergic to iodinated contrast- oral and iv dye; omeprazole; and prilosec [omeprazole magnesium]..  -     Constitutional: Current temp:        His meds, allergies, medical, surgical,  social & family histories were reviewed & updated:  -     He has a current medication list which includes the following prescription(s): albuterol-ipratropium, aspirin, atorvastatin, azithromycin, blood sugar diagnostic, budesonide-formoterol 160-4.5 mcg, clopidogrel, docusate sodium, finasteride, fluad 8701-6324 (65 yr up)(pf), glucosamine/chondr leach a sod, humalog kwikpen insulin, insulin glargine, lancets, methylprednisolone, metoprolol succinate, milk thistle, mometasone, montelukast, multivit,iron,mins/folic acid, niacin (inositol niacinate), omega-3 fatty acids/dha/epa, pen needle, diabetic, polyethylene glycol, potassium chloride sa, rabeprazole, synthroid, azelastine, furosemide, and levocetirizine.  -     He  has a past medical history of Aortic stenosis, Asthma, BPH (benign prostatic hyperplasia), CAD (coronary artery disease), Cirrhosis, Claudication (4/9/2014), Colon polyp, ED (erectile dysfunction), Encounter for blood transfusion, Ex-smoker, Hearing loss NEC, Hepatitis C, Hyperlipidemia, Hypertension, Hypothyroid, DELONTE on CPAP, Osteoarthritis, PVD (peripheral vascular disease), and Type 2 diabetes mellitus.   -     He does not have any pertinent problems on file.   -     He  has a past surgical history that includes Knee surgery (Right); Trigger finger release (Left); Carpal tunnel release (Left); Elbow bursa surgery (Right, 2010); Rectal surgery (2012); Eye surgery; Cataract extraction w/ intraocular lens  implant, bilateral (2008); Cardiac catheterization; Colonoscopy (8/2013); Cardiac surgery; CORONARY ARTERY BYPASS GRAFT (CABG) (N/A, 11/5/2018); SURGICAL PROCUREMENT, VEIN, ENDOSCOPIC (Left, 11/5/2018); ECHOCARDIOGRAM,TRANSESOPHAGEAL (N/A, 11/5/2018); CATHETERIZATION, HEART, BOTH LEFT AND RIGHT (N/A, 11/2/2018); TURP, WITHOUT USING LASER (N/A, 6/19/2018); ESOPHAGOGASTRODUODENOSCOPY (EGD) (N/A, 6/26/2017); ESOPHAGOGASTRODUODENOSCOPY (EGD) (N/A, 5/30/2016); COLONOSCOPY (N/A, 5/30/2016); and COLONOSCOPY  (N/A, 8/23/2013).  -     He  reports that he quit smoking about 46 years ago. He has a 2.00 pack-year smoking history. He quit smokeless tobacco use about 42 years ago. His smokeless tobacco use included chew. He reports that he drinks about 1.2 oz of alcohol per week. He reports that he does not use drugs.  -     His family history includes Heart disease in his brother, father, and mother.  -     He is allergic to iodinated contrast- oral and iv dye; omeprazole; and prilosec [omeprazole magnesium].    Objective:   Physical Exam:  Vitals:  BP (!) 140/86   Pulse 80   Wt 108.6 kg (239 lb 6.7 oz)   BMI 36.40 kg/m²   General appearance:  Well developed, well nourished    Eyes:  Extraocular motions intact, PERRL    Communication:  Significant dysphonia with difficulty sustaining pitch    Ears:  Otoscopy of external auditory canals and tympanic membranes was normal, clinical speech reception thresholds grossly intact, no mass/lesion of auricle.  Nose:  No masses/lesions of external nose, nasal mucosa, septum, and turbinates were within normal limits.  Mouth:  No mass/lesion of lips, teeth, gums, hard/soft palate, tongue, tonsils, or oropharynx.    Cardiovascular:  No pedal edema; Radial Pulses +2     Neck & Lymphatics:  No cervical lymphadenopathy, no neck mass/crepitus/ asymmetry, trachea is midline, no thyroid enlargement/tenderness/mass.    Psych: Oriented x3,  Alert with normal mood and affect.     Respiration/Chest:  Symmetric expansion during respiration, normal respiratory effort.    Skin:  Warm and intact. No ulcerations of face, scalp, neck.      Assessment & Plan:     -    Voice hoarseness / weakness from breath support - Erendira has reduced breath support which is likely contributing his voice problems.  Our voice is created from the lungs driving air through our voice box.  If the pulmonary reserve (amount of air passing) is reduced then the voice will become weak, hoarse, or strained.  Using the voice in  this situation leads to vocal fatigue and sometimes discomfort.  We recommend pulmonary exercises and voice therapy to improve the strength and driving force of the voice.      -------------------------------------------------------------------------------------------------------------------    Endoscopic Exam:  Patient: Erendira Pretty [id number], :1945  Procedure date:12/3/2018  Patient's medications, allergies, past medical, surgical, social and family histories were reviewed and updated as appropriate.  Chief Complaint:  Hoarse and Sore Throat    HPI:  Erendira is a 73 y.o. male with the history of present illness as discussed in the clinic note from today.    Procedure: Risks, benefits, and alternatives of the procedure were discussed with the patient, and the patient consented to the nasal endoscopy.  The nasal cavity was sprayed with a topical decongestant and anesthetic (if needed). The endoscope was passed into each nostril and each nasal cavity was visualized.  On each side the nasal cavity, sinuses (if open), turbinates, and septum were examined with the findings described below. At the end of the examination, the scope was removed. The patient tolerated the procedure well with no complications.       Endoscopic Exam Findings:  -     The right side has normal mucosa  -     The left side has rebecca polyps limited to meati  -     Nasal secretions: No discolored secretions noted bilaterally  -     Nasal septum: no significant deviation or perforation appreciated   -     Inferior turbinate: Normal mucosa without significant hypertrophy bilaterally  -     Middle turbinate: Normal mucosa without significant hypertrophy bilaterally  -     Other findings: No mass or obstructive lesion      -     Laryngeal mucosa is normal  -     Posterior commissure has mild  hypertrophy  -     Lingual tonsils have no  hypertrophy  -     Adenoids have no hypertrophy  -     Right vocal fold: normal mobility     mass/lesion: none  -      Left vocal fold: normal mobility     mass/lesion: none  -     Other findings: none    Assessment & Plan:  - see today's clinic note       Alejandro Parkinson MD

## 2018-12-05 ENCOUNTER — OFFICE VISIT (OUTPATIENT)
Dept: CARDIOTHORACIC SURGERY | Facility: CLINIC | Age: 73
End: 2018-12-05
Payer: MEDICARE

## 2018-12-05 VITALS
WEIGHT: 237.88 LBS | DIASTOLIC BLOOD PRESSURE: 86 MMHG | BODY MASS INDEX: 36.17 KG/M2 | TEMPERATURE: 98 F | HEART RATE: 79 BPM | SYSTOLIC BLOOD PRESSURE: 120 MMHG

## 2018-12-05 DIAGNOSIS — Z95.1 S/P CABG X 2: Primary | ICD-10-CM

## 2018-12-05 PROCEDURE — 99024 POSTOP FOLLOW-UP VISIT: CPT | Mod: S$GLB,,, | Performed by: THORACIC SURGERY (CARDIOTHORACIC VASCULAR SURGERY)

## 2018-12-05 PROCEDURE — 3074F SYST BP LT 130 MM HG: CPT | Mod: CPTII,S$GLB,, | Performed by: THORACIC SURGERY (CARDIOTHORACIC VASCULAR SURGERY)

## 2018-12-05 PROCEDURE — 3079F DIAST BP 80-89 MM HG: CPT | Mod: CPTII,S$GLB,, | Performed by: THORACIC SURGERY (CARDIOTHORACIC VASCULAR SURGERY)

## 2018-12-05 PROCEDURE — 1101F PT FALLS ASSESS-DOCD LE1/YR: CPT | Mod: CPTII,S$GLB,, | Performed by: THORACIC SURGERY (CARDIOTHORACIC VASCULAR SURGERY)

## 2018-12-05 PROCEDURE — 99999 PR PBB SHADOW E&M-EST. PATIENT-LVL II: CPT | Mod: PBBFAC,,, | Performed by: THORACIC SURGERY (CARDIOTHORACIC VASCULAR SURGERY)

## 2018-12-05 NOTE — PROGRESS NOTES
Subjective:      Patient ID: Erendira Pretty is a 73 y.o. male.    Chief Complaint: Post-op Evaluation (1 month)    HPI:  The patient is status post coronary artery bypass grafting x2.  The patient complains of intermittent dysphonia.  But he was evaluated in the ENT clinic without any remarkable findings with normal vocal cord mobility.. The patient denies any fever or chills, shortness of breath cough.  He denies constipation or diarrhea.  He is able to ambulate at will and he ambulates about 1.5 miles times 3 times a day.    Review of patient's allergies indicates:   Allergen Reactions    Iodinated contrast- oral and iv dye Hives     Tachycardia    Omeprazole Hives and Itching    Prilosec [omeprazole magnesium] Hives and Itching            Objective:   /86   Pulse 79   Temp 98.4 °F (36.9 °C) (Oral)   Wt 107.9 kg (237 lb 14 oz)   BMI 36.17 kg/m²     X-Ray Chest PA And Lateral  Narrative: EXAMINATION:  XR CHEST PA AND LATERAL    CLINICAL HISTORY:  Chronic obstructive pulmonary disease with acute lower respiratory infection    TECHNIQUE:  PA and lateral views of the chest were performed.    COMPARISON:  11/07/2018    FINDINGS:  Cardiac silhouette and mediastinal contours are normal.  Lungs demonstrate no acute opacity.  There is minimal blunting of the posterior left costophrenic angle, tiny effusion versus chronic pleural thickening.  Osseous structures are intact.  Impression: Tiny left pleural effusion versus chronic pleural thickening.    Electronically signed by: Yogi Martin MD  Date:    11/30/2018  Time:    11:23         Physical Exam   Constitutional: He is oriented to person, place, and time. He appears well-developed and well-nourished. No distress.   HENT:   Head: Normocephalic and atraumatic.   Cardiovascular: Normal rate and regular rhythm.   Pulmonary/Chest: Effort normal and breath sounds normal.   Abdominal: Soft. Bowel sounds are normal.   Musculoskeletal: He exhibits edema.    Minimal to 1+ edema especially on the right ankle.   Neurological: He is alert and oriented to person, place, and time.   Skin: Skin is warm and dry. He is not diaphoretic.       Assessment:     1. S/P CABG x 2      The patient is doing well status post coronary artery bypass grafting x2.    Plan     Patient will be discharged from cardiac surgery Clinic.  And he may return as needed.        [facility]

## 2018-12-10 RX ORDER — BLOOD SUGAR DIAGNOSTIC
STRIP MISCELLANEOUS
Qty: 100 STRIP | Refills: 11 | Status: SHIPPED | OUTPATIENT
Start: 2018-12-10 | End: 2020-02-18 | Stop reason: SDUPTHER

## 2018-12-13 ENCOUNTER — CLINICAL SUPPORT (OUTPATIENT)
Dept: CARDIOLOGY | Facility: CLINIC | Age: 73
End: 2018-12-13
Attending: INTERNAL MEDICINE
Payer: MEDICARE

## 2018-12-13 ENCOUNTER — DOCUMENTATION ONLY (OUTPATIENT)
Dept: CARDIOLOGY | Facility: CLINIC | Age: 73
End: 2018-12-13

## 2018-12-13 LAB
ESTIMATED PA SYSTOLIC PRESSURE: 32.72
RETIRED EF AND QEF - SEE NOTES: 60 (ref 55–65)
TRICUSPID VALVE REGURGITATION: NORMAL

## 2018-12-13 PROCEDURE — 93306 TTE W/DOPPLER COMPLETE: CPT | Mod: S$GLB,,, | Performed by: INTERNAL MEDICINE

## 2018-12-13 NOTE — PROGRESS NOTES
Pt presented for an echocardiogram today.  This study was performed in conjunction with Optison contrast agent because of poor endocardial visualization.  Procedure was explained to the patient, he verbalized understanding and signed the consent.  IV, 24ga x 3 attempts, was started in the Left hand using aseptic technique.  Administered a total of 3 ml of Optison (lot # [id number], expiration date 2/17/2020).  Patient tolerated the procedure well.  IV discontinued, pressure dressing applied.

## 2018-12-14 ENCOUNTER — TELEPHONE (OUTPATIENT)
Dept: CARDIOLOGY | Facility: CLINIC | Age: 73
End: 2018-12-14

## 2018-12-14 NOTE — TELEPHONE ENCOUNTER
12/14 Pt notified , he will do his other test and get the results ast his f/u appt the day after test. Cm  ----- Message from Frank Gallardo MD sent at 12/14/2018  4:38 AM CST -----  Please tell pt echo is wnl

## 2018-12-18 RX ORDER — INSULIN LISPRO 100 [IU]/ML
INJECTION, SOLUTION INTRAVENOUS; SUBCUTANEOUS
Qty: 3 ML | Refills: 11 | Status: SHIPPED | OUTPATIENT
Start: 2018-12-18 | End: 2020-01-22

## 2018-12-19 ENCOUNTER — CLINICAL SUPPORT (OUTPATIENT)
Dept: CARDIOLOGY | Facility: CLINIC | Age: 73
End: 2018-12-19
Attending: FAMILY MEDICINE
Payer: MEDICARE

## 2018-12-19 DIAGNOSIS — I65.29 STENOSIS OF CAROTID ARTERY, UNSPECIFIED LATERALITY: ICD-10-CM

## 2018-12-19 LAB — INTERNAL CAROTID STENOSIS: ABNORMAL

## 2018-12-19 PROCEDURE — 93880 EXTRACRANIAL BILAT STUDY: CPT | Mod: S$GLB,,, | Performed by: INTERNAL MEDICINE

## 2018-12-20 ENCOUNTER — OFFICE VISIT (OUTPATIENT)
Dept: CARDIOLOGY | Facility: CLINIC | Age: 73
End: 2018-12-20
Payer: MEDICARE

## 2018-12-20 ENCOUNTER — TELEPHONE (OUTPATIENT)
Dept: CARDIOLOGY | Facility: CLINIC | Age: 73
End: 2018-12-20

## 2018-12-20 VITALS
HEART RATE: 84 BPM | WEIGHT: 243.38 LBS | HEIGHT: 68 IN | DIASTOLIC BLOOD PRESSURE: 80 MMHG | BODY MASS INDEX: 36.89 KG/M2 | SYSTOLIC BLOOD PRESSURE: 138 MMHG

## 2018-12-20 DIAGNOSIS — Z79.899 ENCOUNTER FOR MONITORING DIURETIC THERAPY: ICD-10-CM

## 2018-12-20 DIAGNOSIS — I49.3 PVC (PREMATURE VENTRICULAR CONTRACTION): ICD-10-CM

## 2018-12-20 DIAGNOSIS — E11.9 TYPE 2 DIABETES MELLITUS WITHOUT COMPLICATION, WITHOUT LONG-TERM CURRENT USE OF INSULIN: ICD-10-CM

## 2018-12-20 DIAGNOSIS — J44.89 ASTHMA WITH COPD: Primary | ICD-10-CM

## 2018-12-20 DIAGNOSIS — Z95.1 S/P CABG X 2: ICD-10-CM

## 2018-12-20 DIAGNOSIS — Z51.81 ENCOUNTER FOR MONITORING DIURETIC THERAPY: ICD-10-CM

## 2018-12-20 DIAGNOSIS — G47.33 OSA ON CPAP: Chronic | ICD-10-CM

## 2018-12-20 DIAGNOSIS — I27.20 PULMONARY HYPERTENSION: ICD-10-CM

## 2018-12-20 DIAGNOSIS — I10 ESSENTIAL HYPERTENSION: Primary | ICD-10-CM

## 2018-12-20 PROCEDURE — 99999 PR PBB SHADOW E&M-EST. PATIENT-LVL III: CPT | Mod: PBBFAC,,, | Performed by: INTERNAL MEDICINE

## 2018-12-20 PROCEDURE — 3044F HG A1C LEVEL LT 7.0%: CPT | Mod: CPTII,S$GLB,, | Performed by: INTERNAL MEDICINE

## 2018-12-20 PROCEDURE — 99214 OFFICE O/P EST MOD 30 MIN: CPT | Mod: S$GLB,,, | Performed by: INTERNAL MEDICINE

## 2018-12-20 PROCEDURE — 3075F SYST BP GE 130 - 139MM HG: CPT | Mod: CPTII,S$GLB,, | Performed by: INTERNAL MEDICINE

## 2018-12-20 PROCEDURE — 1101F PT FALLS ASSESS-DOCD LE1/YR: CPT | Mod: CPTII,S$GLB,, | Performed by: INTERNAL MEDICINE

## 2018-12-20 PROCEDURE — 3079F DIAST BP 80-89 MM HG: CPT | Mod: CPTII,S$GLB,, | Performed by: INTERNAL MEDICINE

## 2018-12-20 NOTE — TELEPHONE ENCOUNTER
Called and the rx in question is the K+ pt was asking for. I looked and no K+ rx in system. Dr Gallardo just restarted his lasix. Told them I would review with Dr Gallardo. Cm  Per phone conversation with Dr Gallardo : POORNIMA Do lab on Monday before doing rx, HANNAH Gipson 1:39 pm  Called pt and he agrees to do Monday in [facility]. Order placed and scheduled after I checked and [facility] lab will be open on Monday. laurie

## 2018-12-20 NOTE — PROGRESS NOTES
Subjective:   Patient ID:  Erendira Pretty is a 73 y.o. male who presents for follow-up of Hypertension; Coronary Artery Disease; Chest Pain; and Hyperlipidemia  Echo- nml lv function. Pt with atypical CP.    Hypertension   This is a chronic problem. The current episode started more than 1 year ago. The problem has been gradually improving since onset. The problem is controlled. Pertinent negatives include no chest pain, palpitations or shortness of breath. Past treatments include beta blockers. The current treatment provides moderate improvement. There are no compliance problems.    Hyperlipidemia   This is a chronic problem. The current episode started more than 1 year ago. The problem is controlled. Recent lipid tests were reviewed and are variable. Pertinent negatives include no chest pain or shortness of breath. Current antihyperlipidemic treatment includes statins. The current treatment provides moderate improvement of lipids.   Coronary Artery Disease   Presents for follow-up visit. Pertinent negatives include no chest pain, chest pressure, chest tightness, dizziness, leg swelling, muscle weakness, palpitations, shortness of breath or weight gain. Risk factors include hyperlipidemia. The symptoms have been stable. Compliance with diet is variable. Compliance with exercise is variable. Compliance with medications is good.       Review of Systems   Constitution: Negative. Negative for weight gain.   HENT: Negative.    Eyes: Negative.    Cardiovascular: Negative.  Negative for chest pain, leg swelling and palpitations.   Respiratory: Negative.  Negative for chest tightness and shortness of breath.    Endocrine: Negative.    Hematologic/Lymphatic: Negative.    Skin: Negative.    Musculoskeletal: Negative for muscle weakness.   Gastrointestinal: Negative.    Genitourinary: Negative.    Neurological: Negative.  Negative for dizziness.   Psychiatric/Behavioral: Negative.    Allergic/Immunologic: Negative.      Family  History   Problem Relation Age of Onset    Heart disease Mother     Heart disease Father     Heart disease Brother     Colon cancer Neg Hx      Past Medical History:   Diagnosis Date    Aortic stenosis     Asthma     BPH (benign prostatic hyperplasia)     CAD (coronary artery disease)     40% lesion 2002;[facility]    Cirrhosis     Claudication 4/9/2014    Colon polyp     ED (erectile dysfunction)     Encounter for blood transfusion     Ex-smoker     Hearing loss NEC     Hepatitis C     Cured;yuly brownoni 2015    Hyperlipidemia     Hypertension     Hypothyroid     s/p tx graves    DELONTE on CPAP     Osteoarthritis     PVD (peripheral vascular disease)     Type 2 diabetes mellitus      Current Outpatient Medications on File Prior to Visit   Medication Sig Dispense Refill    albuterol-ipratropium (DUO-NEB) 2.5 mg-0.5 mg/3 mL nebulizer solution Take 3 mLs by nebulization 2 (two) times daily. Rescue 120 vial 5    aspirin (ECOTRIN) 81 MG EC tablet Take 1 tablet (81 mg total) by mouth once daily.  0    atorvastatin (LIPITOR) 20 MG tablet Take 1 tablet (20 mg total) by mouth once daily. 90 tablet 3    azelastine (ASTELIN) 137 mcg (0.1 %) nasal spray 2 sprays (274 mcg total) by Nasal route 2 (two) times daily. 30 mL 6    budesonide-formoterol 160-4.5 mcg (SYMBICORT) 160-4.5 mcg/actuation HFAA INHALE 2 PUFFS INTO THE LUNGS TWO TIMES A DAY (Patient taking differently: Inhale 2 puffs into the lungs every 12 (twelve) hours. INHALE 2 PUFFS INTO THE LUNGS TWO TIMES A DAY) 10.2 g 11    clopidogrel (PLAVIX) 75 mg tablet Take 1 tablet (75 mg total) by mouth once daily. 30 tablet 11    docusate sodium (COLACE) 100 MG capsule Take 1 capsule (100 mg total) by mouth 2 (two) times daily. 60 capsule 0    finasteride (PROSCAR) 5 mg tablet Take 1 tablet (5 mg total) by mouth once daily. 30 tablet 11    FLUAD 3580-2450, 65 YR UP,,PF, 45 mcg (15 mcg x 3)/0.5 mL Syrg       GLUCOSAMINE HCL/CHONDR ROSARIO A NA (OSTEO  "BI-FLEX ORAL) Take 1 tablet by mouth 2 (two) times daily.       HUMALOG KWIKPEN INSULIN 100 unit/mL pen INJECT 3-4 UNITS INTO THE SKIN THREE TIMES DAILY BEFORE MEALS. 3 mL 11    insulin glargine (LANTUS SOLOSTAR) 100 unit/mL (3 mL) InPn pen Inject 40 Units into the skin once daily. 5 Syringe 6    levocetirizine (XYZAL) 5 MG tablet Take 1 tablet (5 mg total) by mouth every evening. 30 tablet 11    metoprolol succinate (TOPROL-XL) 50 MG 24 hr tablet Take 1 tablet (50 mg total) by mouth once daily. 60 tablet 11    milk thistle 175 mg tablet Take 175 mg by mouth once daily.      mometasone (NASONEX) 50 mcg/actuation nasal spray 2 sprays by Nasal route once daily. 17 g 12    montelukast (SINGULAIR) 10 mg tablet Take 1 tablet (10 mg total) by mouth once daily. 30 tablet 11    MV,CA,IRON,MIN/FA/PHYTOSTEROL (CENTRUM SPECIALIST HEART ORAL) Take 1 tablet by mouth once daily.       niacin, inositol niacinate, (NIACIN FLUSH FREE ORAL) Take 500 mg by mouth once daily.      OMEGA-3 FATTY ACIDS/DHA/EPA (MEGARED PLANT-OMEGA-3 ORAL) Take 1 capsule by mouth once daily.      polyethylene glycol (GLYCOLAX) 17 gram/dose powder Take 17 g ( 1 capful) by mouth once daily. 527 g 0    RABEprazole (ACIPHEX) 20 mg tablet Take 20 mg by mouth once daily.      SYNTHROID 137 mcg Tab tablet TAKE 2 TABLETS BY MOUTH BEFORE BREAKFAST. 60 tablet 5    [DISCONTINUED] methylPREDNISolone (MEDROL DOSEPACK) 4 mg tablet use as directed 1 Package 1    ACCU-CHEK GRACE PLUS TEST STRP Strp TEST THREE TIMES A  strip 11    furosemide (LASIX) 40 MG tablet Take 1 tablet (40 mg total) by mouth once daily. And as needed for 14 days 40 tablet 11    lancets (ACCU-CHEK FASTCLIX LANCET DRUM) Misc TEST TWO TIMES A  each 5    pen needle, diabetic (BD INSULIN PEN NEEDLE UF MINI) 31 gauge x 3/16" Ndle USE AS DIRECTED 100 each 11     No current facility-administered medications on file prior to visit.      Review of patient's allergies indicates: "   Allergen Reactions    Iodinated contrast- oral and iv dye Hives     Tachycardia    Omeprazole Hives and Itching    Prilosec [omeprazole magnesium] Hives and Itching       Objective:     Physical Exam   Constitutional: He is oriented to person, place, and time. He appears well-developed and well-nourished.   HENT:   Head: Normocephalic and atraumatic.   Eyes: Conjunctivae are normal. Pupils are equal, round, and reactive to light.   Neck: Normal range of motion. Neck supple.   Cardiovascular: Normal rate, regular rhythm, normal heart sounds and intact distal pulses.   Pulmonary/Chest: Effort normal and breath sounds normal.   Abdominal: Soft. Bowel sounds are normal.   Neurological: He is alert and oriented to person, place, and time.   Skin: Skin is warm and dry.   Psychiatric: He has a normal mood and affect.   Nursing note and vitals reviewed.      Assessment:     1. Asthma with COPD    2. Pulmonary hypertension    3. PVC (premature ventricular contraction)    4. S/P CABG x 2    5. Type 2 diabetes mellitus without complication, without long-term current use of insulin    6. DELONTE on CPAP        Plan:     Asthma with COPD    Pulmonary hypertension    PVC (premature ventricular contraction)    S/P CABG x 2    Type 2 diabetes mellitus without complication, without long-term current use of insulin    DELONTE on CPAP      Restart lasix q day  Continue statin, niacin, zetia-hlp  Continue metoprolol-htn  Continue asa- cad

## 2018-12-24 ENCOUNTER — LAB VISIT (OUTPATIENT)
Dept: LAB | Facility: HOSPITAL | Age: 73
End: 2018-12-24
Attending: INTERNAL MEDICINE
Payer: MEDICARE

## 2018-12-24 DIAGNOSIS — I10 ESSENTIAL HYPERTENSION: ICD-10-CM

## 2018-12-24 DIAGNOSIS — Z79.899 ENCOUNTER FOR MONITORING DIURETIC THERAPY: ICD-10-CM

## 2018-12-24 DIAGNOSIS — Z51.81 ENCOUNTER FOR MONITORING DIURETIC THERAPY: ICD-10-CM

## 2018-12-24 LAB
ANION GAP SERPL CALC-SCNC: 8 MMOL/L
BUN SERPL-MCNC: 15 MG/DL
CALCIUM SERPL-MCNC: 9.1 MG/DL
CHLORIDE SERPL-SCNC: 105 MMOL/L
CO2 SERPL-SCNC: 26 MMOL/L
CREAT SERPL-MCNC: 1 MG/DL
EST. GFR  (AFRICAN AMERICAN): >60 ML/MIN/1.73 M^2
EST. GFR  (NON AFRICAN AMERICAN): >60 ML/MIN/1.73 M^2
GLUCOSE SERPL-MCNC: 157 MG/DL
POTASSIUM SERPL-SCNC: 4 MMOL/L
SODIUM SERPL-SCNC: 139 MMOL/L

## 2018-12-24 PROCEDURE — 80048 BASIC METABOLIC PNL TOTAL CA: CPT

## 2018-12-24 PROCEDURE — 36415 COLL VENOUS BLD VENIPUNCTURE: CPT | Mod: PO

## 2018-12-26 ENCOUNTER — LAB VISIT (OUTPATIENT)
Dept: LAB | Facility: HOSPITAL | Age: 73
End: 2018-12-26
Attending: FAMILY MEDICINE
Payer: MEDICARE

## 2018-12-26 ENCOUNTER — TELEPHONE (OUTPATIENT)
Dept: CARDIOLOGY | Facility: CLINIC | Age: 73
End: 2018-12-26

## 2018-12-26 DIAGNOSIS — E03.9 ACQUIRED HYPOTHYROIDISM: ICD-10-CM

## 2018-12-26 DIAGNOSIS — E11.9 TYPE 2 DIABETES MELLITUS WITHOUT COMPLICATION, WITHOUT LONG-TERM CURRENT USE OF INSULIN: ICD-10-CM

## 2018-12-26 LAB
ALBUMIN SERPL BCP-MCNC: 3.3 G/DL
ALP SERPL-CCNC: 78 U/L
ALT SERPL W/O P-5'-P-CCNC: 15 U/L
ANION GAP SERPL CALC-SCNC: 10 MMOL/L
AST SERPL-CCNC: 20 U/L
BILIRUB SERPL-MCNC: 0.4 MG/DL
BUN SERPL-MCNC: 17 MG/DL
CALCIUM SERPL-MCNC: 9.2 MG/DL
CHLORIDE SERPL-SCNC: 108 MMOL/L
CHOLEST SERPL-MCNC: 115 MG/DL
CHOLEST/HDLC SERPL: 3.2 {RATIO}
CO2 SERPL-SCNC: 25 MMOL/L
CREAT SERPL-MCNC: 0.9 MG/DL
EST. GFR  (AFRICAN AMERICAN): >60 ML/MIN/1.73 M^2
EST. GFR  (NON AFRICAN AMERICAN): >60 ML/MIN/1.73 M^2
ESTIMATED AVG GLUCOSE: 134 MG/DL
GLUCOSE SERPL-MCNC: 101 MG/DL
HBA1C MFR BLD HPLC: 6.3 %
HDLC SERPL-MCNC: 36 MG/DL
HDLC SERPL: 31.3 %
LDLC SERPL CALC-MCNC: 65.6 MG/DL
NONHDLC SERPL-MCNC: 79 MG/DL
POTASSIUM SERPL-SCNC: 4 MMOL/L
PROT SERPL-MCNC: 7.4 G/DL
SODIUM SERPL-SCNC: 143 MMOL/L
TRIGL SERPL-MCNC: 67 MG/DL
TSH SERPL DL<=0.005 MIU/L-ACNC: 0.42 UIU/ML

## 2018-12-26 PROCEDURE — 83036 HEMOGLOBIN GLYCOSYLATED A1C: CPT

## 2018-12-26 PROCEDURE — 80061 LIPID PANEL: CPT

## 2018-12-26 PROCEDURE — 80053 COMPREHEN METABOLIC PANEL: CPT

## 2018-12-26 PROCEDURE — 36415 COLL VENOUS BLD VENIPUNCTURE: CPT | Mod: PO

## 2018-12-26 PROCEDURE — 84443 ASSAY THYROID STIM HORMONE: CPT

## 2018-12-26 NOTE — TELEPHONE ENCOUNTER
----- Message from Frank Gallardo MD sent at 12/26/2018  5:38 AM CST -----  Please call the patient regarding normal K, continue diuretic

## 2018-12-26 NOTE — TELEPHONE ENCOUNTER
I contacted patient and reviewed lab results.He inquired about his carotid US done on 12/19/18.I did not see results.I explained to patient I will follow up and make sure the test was available for Dr Gallardo to see.i will contact patient probably at end of week with answer if read.Message to echo tech to check status for reading.

## 2019-01-02 ENCOUNTER — OFFICE VISIT (OUTPATIENT)
Dept: INTERNAL MEDICINE | Facility: CLINIC | Age: 74
End: 2019-01-02
Payer: MEDICARE

## 2019-01-02 VITALS
BODY MASS INDEX: 37.36 KG/M2 | DIASTOLIC BLOOD PRESSURE: 86 MMHG | SYSTOLIC BLOOD PRESSURE: 148 MMHG | OXYGEN SATURATION: 98 % | TEMPERATURE: 100 F | HEART RATE: 92 BPM | HEIGHT: 68 IN | WEIGHT: 246.5 LBS

## 2019-01-02 DIAGNOSIS — E03.9 ACQUIRED HYPOTHYROIDISM: ICD-10-CM

## 2019-01-02 DIAGNOSIS — K74.60 CIRRHOSIS OF LIVER WITHOUT ASCITES, UNSPECIFIED HEPATIC CIRRHOSIS TYPE: ICD-10-CM

## 2019-01-02 DIAGNOSIS — I10 ESSENTIAL HYPERTENSION: ICD-10-CM

## 2019-01-02 DIAGNOSIS — J45.909 UNCOMPLICATED ASTHMA, UNSPECIFIED ASTHMA SEVERITY, UNSPECIFIED WHETHER PERSISTENT: ICD-10-CM

## 2019-01-02 DIAGNOSIS — I25.10 CORONARY ARTERY DISEASE INVOLVING NATIVE CORONARY ARTERY OF NATIVE HEART WITHOUT ANGINA PECTORIS: ICD-10-CM

## 2019-01-02 DIAGNOSIS — I73.9 PVD (PERIPHERAL VASCULAR DISEASE): ICD-10-CM

## 2019-01-02 DIAGNOSIS — E11.9 TYPE 2 DIABETES MELLITUS WITHOUT COMPLICATION, WITHOUT LONG-TERM CURRENT USE OF INSULIN: Primary | ICD-10-CM

## 2019-01-02 DIAGNOSIS — Z12.5 SCREENING FOR PROSTATE CANCER: ICD-10-CM

## 2019-01-02 PROCEDURE — 3079F PR MOST RECENT DIASTOLIC BLOOD PRESSURE 80-89 MM HG: ICD-10-PCS | Mod: CPTII,S$GLB,, | Performed by: FAMILY MEDICINE

## 2019-01-02 PROCEDURE — 1101F PR PT FALLS ASSESS DOC 0-1 FALLS W/OUT INJ PAST YR: ICD-10-PCS | Mod: CPTII,S$GLB,, | Performed by: FAMILY MEDICINE

## 2019-01-02 PROCEDURE — 1101F PT FALLS ASSESS-DOCD LE1/YR: CPT | Mod: CPTII,S$GLB,, | Performed by: FAMILY MEDICINE

## 2019-01-02 PROCEDURE — 99214 OFFICE O/P EST MOD 30 MIN: CPT | Mod: S$GLB,,, | Performed by: FAMILY MEDICINE

## 2019-01-02 PROCEDURE — 3079F DIAST BP 80-89 MM HG: CPT | Mod: CPTII,S$GLB,, | Performed by: FAMILY MEDICINE

## 2019-01-02 PROCEDURE — 3044F PR MOST RECENT HEMOGLOBIN A1C LEVEL <7.0%: ICD-10-PCS | Mod: CPTII,S$GLB,, | Performed by: FAMILY MEDICINE

## 2019-01-02 PROCEDURE — 99999 PR PBB SHADOW E&M-EST. PATIENT-LVL III: CPT | Mod: PBBFAC,,, | Performed by: FAMILY MEDICINE

## 2019-01-02 PROCEDURE — 99999 PR PBB SHADOW E&M-EST. PATIENT-LVL III: ICD-10-PCS | Mod: PBBFAC,,, | Performed by: FAMILY MEDICINE

## 2019-01-02 PROCEDURE — 3077F PR MOST RECENT SYSTOLIC BLOOD PRESSURE >= 140 MM HG: ICD-10-PCS | Mod: CPTII,S$GLB,, | Performed by: FAMILY MEDICINE

## 2019-01-02 PROCEDURE — 3044F HG A1C LEVEL LT 7.0%: CPT | Mod: CPTII,S$GLB,, | Performed by: FAMILY MEDICINE

## 2019-01-02 PROCEDURE — 99214 PR OFFICE/OUTPT VISIT, EST, LEVL IV, 30-39 MIN: ICD-10-PCS | Mod: S$GLB,,, | Performed by: FAMILY MEDICINE

## 2019-01-02 PROCEDURE — 3077F SYST BP >= 140 MM HG: CPT | Mod: CPTII,S$GLB,, | Performed by: FAMILY MEDICINE

## 2019-01-02 RX ORDER — FLUTICASONE PROPIONATE 50 MCG
2 SPRAY, SUSPENSION (ML) NASAL DAILY
Qty: 16 G | Refills: 11 | Status: SHIPPED | OUTPATIENT
Start: 2019-01-02 | End: 2019-05-09

## 2019-01-02 RX ORDER — METOPROLOL SUCCINATE 50 MG/1
50 TABLET, EXTENDED RELEASE ORAL 2 TIMES DAILY
Qty: 60 TABLET | Refills: 11 | Status: SHIPPED | OUTPATIENT
Start: 2019-01-02 | End: 2020-01-26

## 2019-01-02 NOTE — PATIENT INSTRUCTIONS
Stop taking humalog before bedtime  Hold off on taking it premeal until we can look at your home sugars before each meal

## 2019-01-02 NOTE — PROGRESS NOTES
Subjective:       Patient ID: Erendira Pretty is a male.    Chief Complaint: Multiple issues see below    HPI type 2 dm controlled;  hep c cure s/p txharvoni ;cirrhosis;utd dr gibbs    htn  katie med gradual inc home bps to 140s syst or higher  hypoth: tsh nl   Coronary artery disease:/pvd/aortic stenosis: up to date with cardiology. No chest pain, palpitations or shortness of breath. Tolerating medication.    Asthma: no c/o;utd pulm  DIPAK asympt utd     Few weeks bilat calf pain when walking 1/2 mile. Was able to walk over a mile     Last night mult achey joints . Feels fine this am. No urin or uri sympt. Chronic dry cough gradually improving since cough post op (states symbicort not helping so stopped)        Past Medical History:   Diagnosis Date    Aortic stenosis     Asthma     BPH (benign prostatic hyperplasia)     CAD (coronary artery disease)     40% lesion 2002;violet    Cirrhosis     Claudication 4/9/2014    Colon polyp     ED (erectile dysfunction)     Encounter for blood transfusion     Ex-smoker     Hearing loss NEC     Hepatitis C     Cured;dr gibbs, harvoni 2015    Hyperlipidemia     Hypertension     Hypothyroid     s/p tx graves    DELONTE on CPAP     Osteoarthritis     PVD (peripheral vascular disease)     Type 2 diabetes mellitus      Past Surgical History:   Procedure Laterality Date    CARDIAC CATHETERIZATION      CARDIAC SURGERY      CARPAL TUNNEL RELEASE Left     CATARACT EXTRACTION W/ INTRAOCULAR LENS  IMPLANT, BILATERAL  2008    CATHETERIZATION, HEART, BOTH LEFT AND RIGHT N/A 11/2/2018    Performed by Frank Gallardo MD at [facility] CATH LAB    COLONOSCOPY  8/2013    COLONOSCOPY N/A 5/30/2016    Performed by Anna Gibbs MD at [facility] ENDO    COLONOSCOPY N/A 8/23/2013    Performed by Nikhil Watson MD at [facility] ENDO    CORONARY ARTERY BYPASS GRAFT (CABG) N/A 11/5/2018    Performed by Bear De Luna MD at [facility] OR    ECHOCARDIOGRAM,TRANSESOPHAGEAL N/A 11/5/2018    Performed by  [facility]ear [facility] at [facility] OR    ELBOW BURSA SURGERY Right 2010    ESOPHAGOGASTRODUODENOSCOPY (EGD) N/A 2017    Performed by Anna Tomas MD at [facility] ENDO    ESOPHAGOGASTRODUODENOSCOPY (EGD) N/A 2016    Performed by Anan Tomas MD at [facility] ENDO    EYE SURGERY      KNEE SURGERY Right     RECTAL SURGERY      SURGICAL PROCUREMENT, VEIN, ENDOSCOPIC Left 2018    Performed by Bear [facility] Luna MD at [facility] OR    TRIGGER FINGER RELEASE Left     TURP, WITHOUT USING LASER N/A 2018    Performed by Cooper Cordova IV, MD at [facility] OR     Family History   Problem Relation Age of Onset    Heart disease Mother     Heart disease Father     Heart disease Brother     Colon cancer Neg Hx      Social History     Socioeconomic History    Marital status:      Spouse name: YAEL    Number of children: 2    Years of education: None    Highest education level: None   Social Needs    Financial resource strain: None    Food insecurity - worry: None    Food insecurity - inability: None    Transportation needs - medical: None    Transportation needs - non-medical: None   Occupational History    None   Tobacco Use    Smoking status: Former Smoker     Packs/day: 0.50     Years: 4.00     Pack years: 2.00     Last attempt to quit: 1972     Years since quittin.5    Smokeless tobacco: Former User     Types: Chew     Quit date: 1976   Substance and Sexual Activity    Alcohol use: Yes     Alcohol/week: 1.2 oz     Types: 2 Cans of beer per week     Comment: daily  No alcohol prior to surgery    Drug use: No    Sexual activity: Yes     Partners: Female   Other Topics Concern    None   Social History Narrative    . Lives with spouse. 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      Review of Systems  Cardiovascular: no chest pain  Chest: no shortness of breath  Abd: no abd pain  Remainder review of systems negative    Objective:    gen  nad  cvrrr  Chest ctablat  Abd+bs softntnd no hsm no mass   Diff to palpat dors pedis pulses bilat  Ext no edema  Bilateral feet with normal monofilament testing and no lesions.  Skin no rash  M/s no jt swelling or redness      Assessment:     type 2 dm    htn  cirrhosis  Cad  bph  Asthma  pvd  AS  bilat exerc calf pain (hx pvd)    Low grade temp  Plan:    Type 2 diabetes mellitus without complication, without long-term current use of insulin  -     Hemoglobin A1c; Future; Expected date: 07/01/2019    Cf/u card when due    PVD (peripheral vascular disease)  -     Cardiology Lab Ankle Brachial Indices W Stress; Future; if no signif change from last year then may be from overuse. ;if signif change from last year then f/u card)    Other orders  -     Cancel: PSA, Screening; Future; Expected date: 07/01/2019  -     metoprolol succinate (TOPROL-XL) 50 MG 24 hr tablet; Take 1 tablet (50 mg total) by mouth 2 (two) times daily.  Dispense: 60 tablet; Refill: 11  -     fluticasone (FLONASE) 50 mcg/actuation nasal spray; 2 sprays (100 mcg total) by Each Nare route once daily.  Dispense: 16 g; Refill: 11 (states using otc flonse and not nasonex rx)    f/u card when due       Monitor tm. Notify if 100 or higher or if infxn sympt    Inc toprol to bid  Monitor/record home bps /hr. Notify if hr low 50s    a1c same dayas dr guillen lab in July andf u/ me after    F/u juana two weeks review home bps and home sugars:per premeal gluc determine if needs humalog qac as prev rxd or inc lantus or keep as from today on with lantus only  Needs carotid u/s one year     Stop taking humalog before bedtime (has been taking 10qhs for over couple years. Having some insomnia)  Hold off on taking it premeal until we can look at your home sugars before each meal

## 2019-01-03 ENCOUNTER — TELEPHONE (OUTPATIENT)
Dept: CARDIOLOGY | Facility: CLINIC | Age: 74
End: 2019-01-03

## 2019-01-07 ENCOUNTER — TELEPHONE (OUTPATIENT)
Dept: CARDIOLOGY | Facility: CLINIC | Age: 74
End: 2019-01-07

## 2019-01-08 NOTE — TELEPHONE ENCOUNTER
01/07 Rtc and per pt he is having a lot of muscle and joint pain since he started the lipitor. The flush free niacin was stopped when the lipitor was started. It was advised with his DM to be on a chol lowering med. Told him to stop tieh lipitor and I would review with Dr Gallardo and call him back after that. Told him I do want him to call me in 7 days to let me know if stopping the lipitor made any difference. He agreed. Cm  ----- Message from Grace Costa sent at 1/7/2019  8:32 AM CST -----  Patient needs call back rg joint pain from medication..[phone]

## 2019-01-08 NOTE — TELEPHONE ENCOUNTER
----- Message from Grace Costa sent at 1/7/2019  8:32 AM CST -----  Patient needs call back rg joint pain from medication..[phone]

## 2019-01-16 ENCOUNTER — OFFICE VISIT (OUTPATIENT)
Dept: INTERNAL MEDICINE | Facility: CLINIC | Age: 74
End: 2019-01-16
Payer: MEDICARE

## 2019-01-16 ENCOUNTER — TELEPHONE (OUTPATIENT)
Dept: CARDIOLOGY | Facility: CLINIC | Age: 74
End: 2019-01-16

## 2019-01-16 VITALS
SYSTOLIC BLOOD PRESSURE: 128 MMHG | BODY MASS INDEX: 37.29 KG/M2 | HEIGHT: 68 IN | HEART RATE: 88 BPM | TEMPERATURE: 98 F | WEIGHT: 246.06 LBS | DIASTOLIC BLOOD PRESSURE: 88 MMHG | OXYGEN SATURATION: 98 %

## 2019-01-16 DIAGNOSIS — J44.89 ASTHMA WITH COPD: ICD-10-CM

## 2019-01-16 DIAGNOSIS — Z09 FOLLOW UP: Primary | ICD-10-CM

## 2019-01-16 DIAGNOSIS — M54.5 LEFT LOW BACK PAIN, UNSPECIFIED CHRONICITY, WITH SCIATICA PRESENCE UNSPECIFIED: ICD-10-CM

## 2019-01-16 DIAGNOSIS — E11.9 TYPE 2 DIABETES MELLITUS WITHOUT COMPLICATION, WITHOUT LONG-TERM CURRENT USE OF INSULIN: ICD-10-CM

## 2019-01-16 DIAGNOSIS — I10 ESSENTIAL HYPERTENSION: ICD-10-CM

## 2019-01-16 DIAGNOSIS — E78.2 MIXED HYPERLIPIDEMIA: ICD-10-CM

## 2019-01-16 LAB — GLUCOSE SERPL-MCNC: 227 MG/DL (ref 70–110)

## 2019-01-16 PROCEDURE — 99214 PR OFFICE/OUTPT VISIT, EST, LEVL IV, 30-39 MIN: ICD-10-PCS | Mod: S$GLB,,, | Performed by: NURSE PRACTITIONER

## 2019-01-16 PROCEDURE — 3074F PR MOST RECENT SYSTOLIC BLOOD PRESSURE < 130 MM HG: ICD-10-PCS | Mod: CPTII,S$GLB,, | Performed by: NURSE PRACTITIONER

## 2019-01-16 PROCEDURE — 82962 POCT GLUCOSE, HAND-HELD DEVICE: ICD-10-PCS | Mod: S$GLB,,, | Performed by: NURSE PRACTITIONER

## 2019-01-16 PROCEDURE — 3079F DIAST BP 80-89 MM HG: CPT | Mod: CPTII,S$GLB,, | Performed by: NURSE PRACTITIONER

## 2019-01-16 PROCEDURE — 82962 GLUCOSE BLOOD TEST: CPT | Mod: S$GLB,,, | Performed by: NURSE PRACTITIONER

## 2019-01-16 PROCEDURE — 3079F PR MOST RECENT DIASTOLIC BLOOD PRESSURE 80-89 MM HG: ICD-10-PCS | Mod: CPTII,S$GLB,, | Performed by: NURSE PRACTITIONER

## 2019-01-16 PROCEDURE — 3044F PR MOST RECENT HEMOGLOBIN A1C LEVEL <7.0%: ICD-10-PCS | Mod: CPTII,S$GLB,, | Performed by: NURSE PRACTITIONER

## 2019-01-16 PROCEDURE — 3044F HG A1C LEVEL LT 7.0%: CPT | Mod: CPTII,S$GLB,, | Performed by: NURSE PRACTITIONER

## 2019-01-16 PROCEDURE — 1101F PR PT FALLS ASSESS DOC 0-1 FALLS W/OUT INJ PAST YR: ICD-10-PCS | Mod: CPTII,S$GLB,, | Performed by: NURSE PRACTITIONER

## 2019-01-16 PROCEDURE — 99214 OFFICE O/P EST MOD 30 MIN: CPT | Mod: S$GLB,,, | Performed by: NURSE PRACTITIONER

## 2019-01-16 PROCEDURE — 99999 PR PBB SHADOW E&M-EST. PATIENT-LVL IV: CPT | Mod: PBBFAC,,, | Performed by: NURSE PRACTITIONER

## 2019-01-16 PROCEDURE — 99999 PR PBB SHADOW E&M-EST. PATIENT-LVL IV: ICD-10-PCS | Mod: PBBFAC,,, | Performed by: NURSE PRACTITIONER

## 2019-01-16 PROCEDURE — 1101F PT FALLS ASSESS-DOCD LE1/YR: CPT | Mod: CPTII,S$GLB,, | Performed by: NURSE PRACTITIONER

## 2019-01-16 PROCEDURE — 3074F SYST BP LT 130 MM HG: CPT | Mod: CPTII,S$GLB,, | Performed by: NURSE PRACTITIONER

## 2019-01-16 RX ORDER — DEXTROMETHORPHAN HYDROBROMIDE, GUAIFENESIN 5; 100 MG/5ML; MG/5ML
650 LIQUID ORAL EVERY 8 HOURS
Refills: 0 | COMMUNITY
Start: 2019-01-16 | End: 2019-05-09

## 2019-01-16 RX ORDER — TIZANIDINE 2 MG/1
2 TABLET ORAL 2 TIMES DAILY PRN
Qty: 20 TABLET | Refills: 0 | Status: SHIPPED | OUTPATIENT
Start: 2019-01-16 | End: 2019-01-26

## 2019-01-16 NOTE — TELEPHONE ENCOUNTER
01/17 Pt notified to take 2000 mg of flush free niacin since he can not take statins. Pt agreed. Med card updated. Cm  01/16/19 I rtc and spoke with wife. Pt no longer has any aches and pains and is no longer fatigued since he stopped the lipitor. Asked and pt is still on flush free niacin. Told her I would check with Dr Gallardo on what to do next and then rtc. she agreed.  Removed the lipitor from med card and added to allergy list. Cm    ----- Message from Monty Loaiza sent at 1/16/2019  3:36 PM CST -----  Contact: Pt Wife   Pt wife is calling regarding requesting to have nurse call pt wife. Pt wife states that since pt stop taking the atorvastatin (LIPITOR) 20 MG tablet. The aches and pain in joints as   Eased up. Pt wife would now like to know what Pt should do. .[phone] (home)           ...Thank You  Rehana Loaiza

## 2019-01-16 NOTE — PROGRESS NOTES
Subjective:       Patient ID: Erendira Pretty is a 73 y.o. male.    Chief Complaint: Hypertension; Back Pain (lower ); and Hip Pain (left)    Patient presents with a follow up with blood pressure and blood glucose.  Has been check readings at home over the past few weeks: morning 111-138/65-90, lunch 114-134/62-91, dinner 113-144/70-86.  CBG: mornin-138 mg/dl, lunch 111-244 mg/dl, dinner 109-160 mg/dl.  Reports low back pain (right side).  Picked up a pan of meat and felt something strain.  Has been taking Tylenol without any relief.       Review of Systems   Constitutional: Negative for chills and fatigue.   Respiratory: Negative for cough and shortness of breath.    Musculoskeletal: Negative for arthralgias and gait problem.   Skin: Negative for color change and rash.   Psychiatric/Behavioral: Negative for agitation and confusion.       Objective:      Physical Exam   Constitutional: He is oriented to person, place, and time. Vital signs are normal. He appears well-developed and well-nourished.   HENT:   Head: Normocephalic and atraumatic.   Neck: Normal range of motion.   Cardiovascular: Normal rate and regular rhythm.   Pulmonary/Chest: Effort normal and breath sounds normal.   Musculoskeletal: Normal range of motion.        Lumbar back: He exhibits tenderness.        Back:    Neurological: He is alert and oriented to person, place, and time.   Skin: Skin is warm.   Psychiatric: He has a normal mood and affect. His behavior is normal.       Assessment:       1. Follow up    2. Type 2 diabetes mellitus without complication, without long-term current use of insulin    3. Asthma with COPD    4. Essential hypertension    5. Mixed hyperlipidemia    6. Left low back pain, unspecified chronicity, with sciatica presence unspecified        Plan:   Follow up    Type 2 diabetes mellitus without complication, without long-term current use of insulin  -     POCT Glucose, Hand-Held Device    Asthma with COPD    Essential  hypertension    Mixed hyperlipidemia    Left low back pain, unspecified chronicity, with sciatica presence unspecified  -     tiZANidine (ZANAFLEX) 2 MG tablet; Take 1 tablet (2 mg total) by mouth 2 (two) times daily as needed. Causes drowsiness  Dispense: 20 tablet; Refill: 0  -     acetaminophen (TYLENOL) 650 MG TbSR; Take 1 tablet (650 mg total) by mouth every 8 (eight) hours.; Refill: 0        No significant difference between patient and our blood pressure readings in clinic.  There's a 20 mg/dl reading difference with the glucometer.  Patient's being higher.  Advise to follow up with pharmacy regarding glucometer.  Muscle relaxer for lower back pain and Tylenol arthritis.  Follow up as scheduled.

## 2019-01-17 ENCOUNTER — TELEPHONE (OUTPATIENT)
Dept: CARDIOLOGY | Facility: CLINIC | Age: 74
End: 2019-01-17

## 2019-01-17 NOTE — TELEPHONE ENCOUNTER
The patient needed clarification that Dr Gallardo wanted him to take 2000 mg of Niacin daily.I looked in record and read instructions from Dr Gallardo as stated to take 2000 mg daily. The patient verbalized understanding.

## 2019-01-17 NOTE — TELEPHONE ENCOUNTER
----- Message from Agata Brooks sent at 1/17/2019  1:30 PM CST -----  Contact: pt  Please call pt @[phone], pt need to clarify medication, pt states he did not understand.

## 2019-02-05 ENCOUNTER — TELEPHONE (OUTPATIENT)
Dept: INTERNAL MEDICINE | Facility: CLINIC | Age: 74
End: 2019-02-05

## 2019-02-05 NOTE — TELEPHONE ENCOUNTER
----- Message from Melisa Mckeon sent at 2/5/2019  1:26 PM CST -----  Contact: Agata/wife  Agata called and stated the pharmacy has been trying to get a refill and hasn't heard back.    Rx: Lantus Solostar - 40 units 1 time daily - 30 day    [facility] - [facility] [address] [address]  [address] [address] [address]  Phone: [phone] Fax: [phone]    She can be contacted at [phone].    Thanks,  Melisa

## 2019-02-14 DIAGNOSIS — J44.89 ASTHMA WITH COPD: Primary | ICD-10-CM

## 2019-02-15 ENCOUNTER — HOSPITAL ENCOUNTER (OUTPATIENT)
Dept: RADIOLOGY | Facility: HOSPITAL | Age: 74
Discharge: HOME | End: 2019-02-15
Attending: INTERNAL MEDICINE
Payer: MEDICARE

## 2019-02-15 ENCOUNTER — OFFICE VISIT (OUTPATIENT)
Dept: PULMONOLOGY | Facility: CLINIC | Age: 74
End: 2019-02-15
Payer: MEDICARE

## 2019-02-15 VITALS
OXYGEN SATURATION: 95 % | DIASTOLIC BLOOD PRESSURE: 76 MMHG | WEIGHT: 248 LBS | HEIGHT: 68 IN | HEART RATE: 59 BPM | SYSTOLIC BLOOD PRESSURE: 132 MMHG | RESPIRATION RATE: 18 BRPM | BODY MASS INDEX: 37.59 KG/M2

## 2019-02-15 DIAGNOSIS — G47.33 OSA ON CPAP: Chronic | ICD-10-CM

## 2019-02-15 DIAGNOSIS — J44.89 ASTHMA WITH COPD: ICD-10-CM

## 2019-02-15 DIAGNOSIS — J42 CHRONIC WHEEZY BRONCHITIS: Primary | ICD-10-CM

## 2019-02-15 DIAGNOSIS — J32.4 CHRONIC PANSINUSITIS: ICD-10-CM

## 2019-02-15 DIAGNOSIS — R51.9 SINUS HEADACHE: ICD-10-CM

## 2019-02-15 PROCEDURE — 71046 XR CHEST PA AND LATERAL: ICD-10-PCS | Mod: 26,,, | Performed by: RADIOLOGY

## 2019-02-15 PROCEDURE — 71046 X-RAY EXAM CHEST 2 VIEWS: CPT | Mod: TC

## 2019-02-15 PROCEDURE — 3078F PR MOST RECENT DIASTOLIC BLOOD PRESSURE < 80 MM HG: ICD-10-PCS | Mod: CPTII,S$GLB,, | Performed by: INTERNAL MEDICINE

## 2019-02-15 PROCEDURE — 3078F DIAST BP <80 MM HG: CPT | Mod: CPTII,S$GLB,, | Performed by: INTERNAL MEDICINE

## 2019-02-15 PROCEDURE — 99999 PR PBB SHADOW E&M-EST. PATIENT-LVL III: CPT | Mod: PBBFAC,,, | Performed by: INTERNAL MEDICINE

## 2019-02-15 PROCEDURE — 1101F PR PT FALLS ASSESS DOC 0-1 FALLS W/OUT INJ PAST YR: ICD-10-PCS | Mod: CPTII,S$GLB,, | Performed by: INTERNAL MEDICINE

## 2019-02-15 PROCEDURE — 1101F PT FALLS ASSESS-DOCD LE1/YR: CPT | Mod: CPTII,S$GLB,, | Performed by: INTERNAL MEDICINE

## 2019-02-15 PROCEDURE — 3075F SYST BP GE 130 - 139MM HG: CPT | Mod: CPTII,S$GLB,, | Performed by: INTERNAL MEDICINE

## 2019-02-15 PROCEDURE — 99999 PR PBB SHADOW E&M-EST. PATIENT-LVL III: ICD-10-PCS | Mod: PBBFAC,,, | Performed by: INTERNAL MEDICINE

## 2019-02-15 PROCEDURE — 99214 OFFICE O/P EST MOD 30 MIN: CPT | Mod: S$GLB,,, | Performed by: INTERNAL MEDICINE

## 2019-02-15 PROCEDURE — 71046 X-RAY EXAM CHEST 2 VIEWS: CPT | Mod: 26,,, | Performed by: RADIOLOGY

## 2019-02-15 PROCEDURE — 3075F PR MOST RECENT SYSTOLIC BLOOD PRESS GE 130-139MM HG: ICD-10-PCS | Mod: CPTII,S$GLB,, | Performed by: INTERNAL MEDICINE

## 2019-02-15 PROCEDURE — 99214 PR OFFICE/OUTPT VISIT, EST, LEVL IV, 30-39 MIN: ICD-10-PCS | Mod: S$GLB,,, | Performed by: INTERNAL MEDICINE

## 2019-02-15 RX ORDER — BUDESONIDE AND FORMOTEROL FUMARATE DIHYDRATE 160; 4.5 UG/1; UG/1
AEROSOL RESPIRATORY (INHALATION)
Qty: 10.2 G | Refills: 11 | Status: SHIPPED | OUTPATIENT
Start: 2019-02-15 | End: 2020-04-06

## 2019-02-15 NOTE — PROGRESS NOTES
"Subjective:       Patient ID: Erendira Pretty is a 73 y.o. male.    Chief Complaint: asthma with copd and Sleep Apnea    Mr Maria De Jesus Krishna is 73 years old  DELONTE on CPAP, Chr wheezy asthmatic bronchitis, Chr nasal congestion  Recent open heart surgery; voice better, still deconditioned  Concern nasal congestion last 2 weeks, Post nasal drip, Denies fever.  Sleeps well with CPAP but stopped up in AM.  No anosmia or blood in nose.  No daytime sleepiness  Ep worth score 6. CAT score 16, ACt score 21.  Respiratory symptoms minimal,   Immunizations are current  Not on Oxygen  Not needed to use nebulizer  Facial heaviness, sinus headache and congestion  Seen Dr Acosta prior for voice hoarseness.   plan sinus imaging.    Asthma   There is no cough (morning cough and sputum), shortness of breath or wheezing. Associated symptoms include headaches. His past medical history is significant for asthma.     Review of Systems   Constitutional: Negative for fatigue.   HENT: Positive for voice change and congestion.    Eyes: Negative.    Respiratory: Negative.  Negative for cough (morning cough and sputum), shortness of breath and wheezing.    Cardiovascular: Negative for leg swelling.   Genitourinary: Negative.    Endocrine: endocrine negative   Musculoskeletal: Negative.    Skin: Negative.    Gastrointestinal: Negative.    Neurological: Positive for headaches.   Psychiatric/Behavioral: Negative.        Objective:       Vitals:    02/15/19 1049   BP: 132/76   Pulse: (!) 59   Resp: 18   SpO2: 95%   Weight: 112.5 kg (248 lb 0.3 oz)   Height: 5' 8" (1.727 m)     Physical Exam   Constitutional: He is oriented to person, place, and time. Vital signs are normal. He appears well-developed and well-nourished. He is obese.   HENT:   Head: Normocephalic.   Nose: Mucosal edema and rhinorrhea present.       Mouth/Throat: Oropharynx is clear and moist. Mallampati Score: III.   Neck: Normal range of motion. Neck supple.   Cardiovascular: Normal rate, " regular rhythm and intact distal pulses.   No murmur heard.  Pulmonary/Chest: Normal expansion, symmetric chest wall expansion, effort normal and breath sounds normal. He has no decreased breath sounds. He has no rhonchi. He has no rales.   Abdominal: Soft. Bowel sounds are normal.   Musculoskeletal: Normal range of motion. He exhibits no edema.   Lymphadenopathy:     He has no cervical adenopathy.   Neurological: He is alert and oriented to person, place, and time. He has normal reflexes.   Skin: Skin is warm and dry.   Psychiatric: He has a normal mood and affect.   Nursing note and vitals reviewed.    Personal Diagnostic Review  CXR   Performed today 02/15/2019: Normal  Serial prior films including 4 view films reveiwed    No flowsheet data found.      Assessment:       Problem List Items Addressed This Visit     DELONTE on CPAP (Chronic)    Relevant Orders    MyChart Patient Entered CPAP Usage    Chronic wheezy bronchitis - Primary    Relevant Medications    budesonide-formoterol 160-4.5 mcg (SYMBICORT) 160-4.5 mcg/actuation HFAA    RESOLVED: Asthma with COPD      Other Visit Diagnoses     Chronic pansinusitis        Relevant Orders    CT Sinuses without Contrast    Sinus headache            Answers for HPI/ROS submitted by the patient on 2/15/2019   Asthma  In the past 4 weeks, how much of the time did your asthma keep you from getting as much done at work, school, or at home?: none of the time  During the past 4 weeks, how often have you had shortness of breath?: not at all  During the past 4 weeks, how often did your asthma symptoms (Wheezing, coughing, shortness of breath, chest tightness or pain) wake you up at night or earlier that usual in the morning?: not at all  During the past 4 weeks, how often have you used your rescue inhaler or nebulizer medication (such as albuterol)?: 2 or 3 times a week  How would you rate your asthma control during the past 4 weeks?: somewhat controlled   : 21      Plan:        Continue Symbicort: Vortex spacer   Adherence with spacer  Continue CPAP and download to validate adherence  Digital compliance monitoring  CT Sinus       Follow-up in about 4 weeks (around 3/15/2019), or CT SINUS, CXR, SEAN.    This note was prepared using voice recognition system and is likely to have sound alike errors that may have been overlooked even after proof reading.  Please call me with any questions    Discussed diagnosis, its evaluation, treatment and usual course. All questions answered.    Thank you for the courtesy of participating in the care of this patient    Rishi Molina MD

## 2019-02-19 ENCOUNTER — HOSPITAL ENCOUNTER (OUTPATIENT)
Dept: RADIOLOGY | Facility: HOSPITAL | Age: 74
Discharge: HOME | End: 2019-02-19
Attending: INTERNAL MEDICINE
Payer: MEDICARE

## 2019-02-19 DIAGNOSIS — J32.4 CHRONIC PANSINUSITIS: ICD-10-CM

## 2019-02-19 PROCEDURE — 70486 CT MAXILLOFACIAL W/O DYE: CPT | Mod: TC

## 2019-02-19 PROCEDURE — 70486 CT SINUSES WITHOUT CONTRAST: ICD-10-PCS | Mod: 26,,, | Performed by: RADIOLOGY

## 2019-02-19 PROCEDURE — 70486 CT MAXILLOFACIAL W/O DYE: CPT | Mod: 26,,, | Performed by: RADIOLOGY

## 2019-02-20 ENCOUNTER — TELEPHONE (OUTPATIENT)
Dept: CARDIOLOGY | Facility: CLINIC | Age: 74
End: 2019-02-20

## 2019-02-20 NOTE — TELEPHONE ENCOUNTER
The patient has been notified of this information and all questions answered.  Notified him to call us back if the spots do not resolve

## 2019-02-20 NOTE — TELEPHONE ENCOUNTER
I returned pts call. He says he has been getting red spots on his arms. No itching. Also says it is affecting his muscles and joints. He says it was not this way before starting niacin. Is this from his medication? Please advise

## 2019-02-20 NOTE — TELEPHONE ENCOUNTER
----- Message from Mayra Barfield sent at 2/20/2019  9:43 AM CST -----  Contact: self [phone]  .Type:  Needs Medical Advice    Who Called: Erendira Woodsoy  Symptoms (please be specific): red spots on arm   How long has patient had these symptoms:  1 week  Pharmacy name and phone #:  .  [facility] - [place] [place] - [address] [address] [id number] [address]  [facility] [address]  Phone: [phone] Fax: [phone]      Would the patient rather a call back or a response via [facility]? Call back  Best Call Back Number: [phone]  Additional Information: Pt states he has been getting red spots on arm since change in dosage for Magnesium.  Please clal back at [phone]

## 2019-02-26 ENCOUNTER — OFFICE VISIT (OUTPATIENT)
Dept: OTOLARYNGOLOGY | Facility: CLINIC | Age: 74
End: 2019-02-26
Payer: MEDICARE

## 2019-02-26 VITALS — WEIGHT: 250.25 LBS | BODY MASS INDEX: 37.93 KG/M2 | HEIGHT: 68 IN

## 2019-02-26 DIAGNOSIS — J33.9 NASAL POLYPOSIS: ICD-10-CM

## 2019-02-26 DIAGNOSIS — Z98.890 HISTORY OF ENDOSCOPIC SINUS SURGERY: ICD-10-CM

## 2019-02-26 DIAGNOSIS — Z01.818 PRE-OP TESTING: Primary | ICD-10-CM

## 2019-02-26 DIAGNOSIS — J32.4 CHRONIC PANSINUSITIS: ICD-10-CM

## 2019-02-26 PROCEDURE — 99214 OFFICE O/P EST MOD 30 MIN: CPT | Mod: S$GLB,,, | Performed by: OTOLARYNGOLOGY

## 2019-02-26 PROCEDURE — 1101F PT FALLS ASSESS-DOCD LE1/YR: CPT | Mod: CPTII,S$GLB,, | Performed by: OTOLARYNGOLOGY

## 2019-02-26 PROCEDURE — 99999 PR PBB SHADOW E&M-EST. PATIENT-LVL III: CPT | Mod: PBBFAC,,, | Performed by: OTOLARYNGOLOGY

## 2019-02-26 PROCEDURE — 99214 PR OFFICE/OUTPT VISIT, EST, LEVL IV, 30-39 MIN: ICD-10-PCS | Mod: S$GLB,,, | Performed by: OTOLARYNGOLOGY

## 2019-02-26 PROCEDURE — 1101F PR PT FALLS ASSESS DOC 0-1 FALLS W/OUT INJ PAST YR: ICD-10-PCS | Mod: CPTII,S$GLB,, | Performed by: OTOLARYNGOLOGY

## 2019-02-26 PROCEDURE — 99999 PR PBB SHADOW E&M-EST. PATIENT-LVL III: ICD-10-PCS | Mod: PBBFAC,,, | Performed by: OTOLARYNGOLOGY

## 2019-02-28 ENCOUNTER — TELEPHONE (OUTPATIENT)
Dept: CARDIOLOGY | Facility: CLINIC | Age: 74
End: 2019-02-28

## 2019-02-28 NOTE — TELEPHONE ENCOUNTER
----- Message from Rosetta Yoon sent at 2/28/2019  3:41 PM CST -----  Contact: wife Agata  Calling concerning if patient should stop Plavix for surgery. please call wife @ [phone]. Thanks, kerry

## 2019-02-28 NOTE — TELEPHONE ENCOUNTER
I spoke with pt. He is needing sinus surgery. Scheduled for 3/27 with Dr Acosta. Pt takes aspirin and Plavix. Please advise if he has cardiac clearance and any instructions on blood thinners.

## 2019-03-01 NOTE — PROGRESS NOTES
Referring Provider:    No referring provider defined for this encounter.  Subjective:   Patient: Erenidra Pretty [id number], :1945   Visit date:2019 8:54 AM    Chief Complaint:  Other (review scan)    HPI:  Erendira is a 73 y.o. male who I was asked to see in consultation for evaluation of chronic throat discomfort:      Patient was initially seen in 2017.  He reported significant nasal obstruction worse on the left than the right.  He also reported facial pain, pressure and discomfort.  He had had nasal polypectomy performed twice with the last one in about the year .  He had never had ALLERGY testing performed.  He reported moderate ALLERGIC symptoms of nasal congestion, nasal itchiness and rhinorrhea.  No imaging had been performed prior to seeing me.  He had been started on Augmentin shortly before his initial clinic visit and was having significant gastrointestinal issues with this.  He does have asthma.  Nasal endoscopy revealed bilateral nasal polyps with a very large polyp on the left side.  This was removed.  I placed him on levofloxacin, Nasonex twice a day and Astelin twice a day.  He was not given steroids due to his diabetes.       He return to clinic 2017.  He reports that his nasal obstruction is significantly better.  Additionally he is no longer having facial pain or pressure.  He denies significant nasal drainage.  He is having a persistent cough that is been lingering for about 2 months now.  He was on Singulair in the past but has been off of that for some time.  He is having to use his inhalers, specifically his albuterol inhaler multiple times daily.  ALLERGY testing by blood work was performed and revealed numerous class III ALLERGIES.  Erendira returned April 3, 2017.  I had directed the patient to use Nasonex, Astelin, Xyzal and Singulair.  He stopped using the nasal sprays about 1 month ago but continued oral medications.  Despite this, he reported that his  breathing through the nose had dramatically improved.  He had no facial pressure or pain.  He had minimal nasal drainage.  His cough had resolved.  Overall he was feeling very good.  Then plan at this point, was to resume Nasonex and continue with Xyzal and Singulair.      When he returned May 15, 2017, he had been using Nasonex, Xyzal and Singulair for his allergy regimen. Over the past week, he has had increased post nasal and cough.  The cough somewhat improved but the sore throat has gotten worse.  The post nasal drip has been worsening over the same period of time but may be mildly better in the past 2 days.  The sore throat is the most bothersome issue at this point and he has some difficulty swallowing.  Severity- moderate to severe. Quality- burning.  Modifying factors- none, no improvement with choraseptic spray.  Assoc ssx- nasal drainage, cough     Sept 18, 2017  He has been Xyzal regularly.  He uses Nasonex and Flonase PRN.  No pressure or pain.  No drainage. Feeling well.    December 3, 2018  Patient underwent coronary artery bypass surgery November 5, 2018.  Postoperatively, he had a fairly severe cough but this has been improving.  He has had some significant dysphonia with difficulty in projecting the voice.  He denies any pain with phonation.  He denies any significant shortness of breath.  He reports that the cough is dramatically better than a few weeks ago.  He has been using Xyzal and Singulair regularly but only using the Flonase as needed.  He discontinued the Astelin sometime ago.  He reports that he is having significant watery rhinorrhea as well as postnasal drip.  His cough is nonproductive.  He denies any significant exacerbating or relieving factors.    03/01/2019   He has had progressive nasal congestion and cough.  He asthma has been worsening.  He continues to use his allergy medications with good compliance.     Review of Systems:  -     Allergic/Immunologic: is allergic to lipitor  [atorvastatin]; iodinated contrast- oral and iv dye; omeprazole; and prilosec [omeprazole magnesium]..  -     Constitutional: Current temp:        His meds, allergies, medical, surgical, social & family histories were reviewed & updated:  -     He has a current medication list which includes the following prescription(s): accu-chek lux plus meter, accu-chek lux plus test strp, acetaminophen, albuterol-ipratropium, aspirin, azelastine, budesonide-formoterol 160-4.5 mcg, clopidogrel, docusate sodium, finasteride, fluad 5007-2762 (65 yr up)(pf), fluticasone, glucosamine/chondr leach a sod, humalog kwikpen insulin, insulin, lancets, levocetirizine, metoprolol succinate, milk thistle, montelukast, multivit,iron,mins/folic acid, niacin (inositol niacinate), omega-3 fatty acids/dha/epa, pen needle, diabetic, polyethylene glycol, rabeprazole, synthroid, UNABLE TO FIND, and furosemide.  -     He  has a past medical history of Aortic stenosis, Asthma, BPH (benign prostatic hyperplasia), CAD (coronary artery disease), Cirrhosis, Claudication (4/9/2014), Colon polyp, ED (erectile dysfunction), Encounter for blood transfusion, Ex-smoker, Hearing loss NEC, Hepatitis C, Hyperlipidemia, Hypertension, Hypothyroid, DELONTE on CPAP, Osteoarthritis, PVD (peripheral vascular disease), and Type 2 diabetes mellitus.   -     He does not have any pertinent problems on file.   -     He  has a past surgical history that includes Knee surgery (Right); Trigger finger release (Left); Carpal tunnel release (Left); Elbow bursa surgery (Right, 2010); Rectal surgery (2012); Eye surgery; Cataract extraction w/ intraocular lens  implant, bilateral (2008); Cardiac catheterization; Colonoscopy (8/2013); Colonoscopy (N/A, 5/30/2016); Transurethral resection of prostate (TURP) without use of laser (N/A, 6/19/2018); Coronary Artery Bypass Graft (CABG) (N/A, 11/5/2018); Endoscopic harvest of vein (Left, 11/5/2018); Cardiac surgery; and Catheterization of both  "left and right heart (N/A, 11/2/2018).  -     He  reports that he quit smoking about 46 years ago. He has a 2.00 pack-year smoking history. He quit smokeless tobacco use about 42 years ago. His smokeless tobacco use included chew. He reports that he drinks about 1.2 oz of alcohol per week. He reports that he does not use drugs.  -     His family history includes Heart disease in his brother, father, and mother.  -     He is allergic to lipitor [atorvastatin]; iodinated contrast- oral and iv dye; omeprazole; and prilosec [omeprazole magnesium].    Objective:   Physical Exam:  Vitals:  Ht 5' 8" (1.727 m)   Wt 113.5 kg (250 lb 3.6 oz)   BMI 38.05 kg/m²   General appearance:  Well developed, well nourished    Eyes:  Extraocular motions intact, PERRL    Communication:  Significant dysphonia with difficulty sustaining pitch    Ears:  Otoscopy of external auditory canals and tympanic membranes was normal, clinical speech reception thresholds grossly intact, no mass/lesion of auricle.  Nose:  Polyps in nasal cavities bilaterally. No masses/lesions of external nose, nasal mucosa, septum, and turbinates were within normal limits.  Mouth:  No mass/lesion of lips, teeth, gums, hard/soft palate, tongue, tonsils, or oropharynx.    Cardiovascular:  No pedal edema; Radial Pulses +2     Neck & Lymphatics:  No cervical lymphadenopathy, no neck mass/crepitus/ asymmetry, trachea is midline, no thyroid enlargement/tenderness/mass.    Psych: Oriented x3,  Alert with normal mood and affect.     Respiration/Chest:  Symmetric expansion during respiration, normal respiratory effort.    Skin:  Warm and intact. No ulcerations of face, scalp, neck.      Assessment & Plan:     Recommend revision sinus surgery    Open all sinuses bilaterally    Discussed need for chronic allergy management and debridements    Discussed recalcitrant nature of nasal polyps    We discussed at length the risk of sinus surgery including, but not limited to, " recurrence of disease, bleeding, infection, septal perforation, tooth or cheek numbness, vision changes, orbital injury, CSF leak and changes in smell.  The patient had opportunity to ask questions and I answered all of them to their satisfaction.  We will proceed as planned.

## 2019-03-04 ENCOUNTER — TELEPHONE (OUTPATIENT)
Dept: CARDIOLOGY | Facility: CLINIC | Age: 74
End: 2019-03-04

## 2019-03-04 RX ORDER — RABEPRAZOLE SODIUM 20 MG/1
TABLET, DELAYED RELEASE ORAL
Qty: 60 TABLET | Refills: 11 | Status: ON HOLD | OUTPATIENT
Start: 2019-03-04 | End: 2019-03-27 | Stop reason: HOSPADM

## 2019-03-04 NOTE — TELEPHONE ENCOUNTER
Rtc pt call and notified him the clearance was sent to Dr Acosta and to please call him to complete his instructions. cm

## 2019-03-08 ENCOUNTER — CLINICAL SUPPORT (OUTPATIENT)
Dept: CARDIOLOGY | Facility: CLINIC | Age: 74
End: 2019-03-08
Payer: MEDICARE

## 2019-03-08 ENCOUNTER — LAB VISIT (OUTPATIENT)
Dept: LAB | Facility: HOSPITAL | Age: 74
End: 2019-03-08
Attending: OTOLARYNGOLOGY
Payer: MEDICARE

## 2019-03-08 DIAGNOSIS — Z01.818 PRE-OP TESTING: ICD-10-CM

## 2019-03-08 LAB
ANION GAP SERPL CALC-SCNC: 10 MMOL/L
BASOPHILS # BLD AUTO: 0.03 K/UL
BASOPHILS NFR BLD: 0.6 %
BUN SERPL-MCNC: 15 MG/DL
CALCIUM SERPL-MCNC: 9 MG/DL
CHLORIDE SERPL-SCNC: 104 MMOL/L
CO2 SERPL-SCNC: 25 MMOL/L
CREAT SERPL-MCNC: 1 MG/DL
DIFFERENTIAL METHOD: ABNORMAL
EOSINOPHIL # BLD AUTO: 0.2 K/UL
EOSINOPHIL NFR BLD: 3.9 %
ERYTHROCYTE [DISTWIDTH] IN BLOOD BY AUTOMATED COUNT: 21.2 %
EST. GFR  (AFRICAN AMERICAN): >60 ML/MIN/1.73 M^2
EST. GFR  (NON AFRICAN AMERICAN): >60 ML/MIN/1.73 M^2
GLUCOSE SERPL-MCNC: 221 MG/DL
HCT VFR BLD AUTO: 34.7 %
HGB BLD-MCNC: 10.3 G/DL
IMM GRANULOCYTES # BLD AUTO: 0.02 K/UL
IMM GRANULOCYTES NFR BLD AUTO: 0.4 %
LYMPHOCYTES # BLD AUTO: 1.3 K/UL
LYMPHOCYTES NFR BLD: 28.5 %
MCH RBC QN AUTO: 23.3 PG
MCHC RBC AUTO-ENTMCNC: 29.7 G/DL
MCV RBC AUTO: 79 FL
MONOCYTES # BLD AUTO: 0.5 K/UL
MONOCYTES NFR BLD: 9.7 %
NEUTROPHILS # BLD AUTO: 2.7 K/UL
NEUTROPHILS NFR BLD: 56.9 %
NRBC BLD-RTO: 0 /100 WBC
PLATELET # BLD AUTO: 181 K/UL
PMV BLD AUTO: 10.5 FL
POTASSIUM SERPL-SCNC: 4.3 MMOL/L
RBC # BLD AUTO: 4.42 M/UL
SODIUM SERPL-SCNC: 139 MMOL/L
WBC # BLD AUTO: 4.66 K/UL

## 2019-03-08 PROCEDURE — 85025 COMPLETE CBC W/AUTO DIFF WBC: CPT

## 2019-03-08 PROCEDURE — 36415 COLL VENOUS BLD VENIPUNCTURE: CPT

## 2019-03-08 PROCEDURE — 80048 BASIC METABOLIC PNL TOTAL CA: CPT

## 2019-03-11 NOTE — PRE-PROCEDURE INSTRUCTIONS
Pre op instructions reviewed with patient's Wife per phone:    To confirm, Your surgeon has instructed you:  Surgery is scheduled 3/27/19 at [facility].      Please report to [facility] 1st floor main lobby by MD.   Pre admit office to call afternoon prior to surgery with final arrival time      INSTRUCTIONS IMPORTANT!!!  ¨ Do not eat, drink, or smoke after 12 midnight-including water. OK to brush teeth, no gum, candy or mints!    ¨ Take only these medicines with a small swallow of water-morning of surgery.  Symbicort, Metoprolol, Aciphex, Synthroid    ____  Do not wear makeup, including mascara.  ____  No powder, lotions or creams to surgical area.  ____  Please remove all jewelry, including piercings and leave at home.  ____  No money or valuables needed. Please leave at home.  ____  Please bring identification and insurance information to hospital.  ____  If going home the same day, arrange for a ride home. You will not be able to   drive if Anesthesia was used.  ____  Children, under 12 years old, must remain in the waiting room with an adult.  They are not allowed in patient areas.  ____  Wear loose fitting clothing. Allow for dressings, bandages.  ____  Stop Aspirin, Ibuprofen, Motrin and Aleve at least 5-7 days before surgery, unless otherwise instructed by your doctor, or the nurse.   You MAY use Tylenol/acetaminophen until day of surgery.  ____  If you take diabetic medication, do not take am of surgery unless instructed by   Doctor.  ____ Stop taking any Fish Oil supplement or any Vitamins that contain Vitamin E at least 5 days prior to surgery.          Bathing Instructions-- The night before surgery and the morning prior to coming to the hospital:   -Do not shave the surgical area.   -Shower and wash your hair and body as usual with anti-bacterial  soap and shampoo.   -Rinse your hair and body completely.   -Use one packet of hibiclens to wash the surgical site (using your hand) gently  for 5 minutes.  Do not scrub you skin too hard.   -Do not use hibiclens on your head, face, or genitals.   -Do not wash with anti-bacterial soap after you use the hibiclens.   -Rinse your body thoroughly.   -Dry with clean, soft towel.  Do not use lotion, cream, deodorant, or powders on   the surgical site.    Use antibacterial soap in place of hibiclens if your surgery is on the head, face or genitals.         Surgical Site Infection    Prevention of surgical site infections:     -Keep incisions clean and dry.   -Do not soak/submerge incisions in water until completely healed.   -Do not apply lotions, powders, creams, or deodorants to site.   -Always make sure hands are cleaned with antibacterial soap/ alcohol-based   prior to touching the surgical site.  (This includes doctors, nurses, staff, and yourself.)    Signs and symptoms:   -Redness and pain around the area where you had surgery   -Drainage of cloudy fluid from your surgical wound   -Fever over 100.4  I have read or had read and explained to me, and understand the above information.

## 2019-03-13 ENCOUNTER — TELEPHONE (OUTPATIENT)
Dept: CARDIOLOGY | Facility: CLINIC | Age: 74
End: 2019-03-13

## 2019-03-13 NOTE — TELEPHONE ENCOUNTER
03/13 I called and rev'd what the furosemide was for. Told him fluid pill. Not having any problems , his wife was confused on his meds. Will see us next week. Cm    ----- Message from Rosetta Yoon sent at 3/13/2019  9:41 AM CDT -----  Contact: Kathi wife  States patient is taking Lasix 40 mg @ 1 pill daily and want to know if patient need to continue taking the medication. Please call  Patient @ [phone]. Thanks, kerry

## 2019-03-21 ENCOUNTER — OFFICE VISIT (OUTPATIENT)
Dept: CARDIOLOGY | Facility: CLINIC | Age: 74
End: 2019-03-21
Payer: MEDICARE

## 2019-03-21 ENCOUNTER — OFFICE VISIT (OUTPATIENT)
Dept: SLEEP MEDICINE | Facility: CLINIC | Age: 74
End: 2019-03-21
Payer: MEDICARE

## 2019-03-21 ENCOUNTER — CLINICAL SUPPORT (OUTPATIENT)
Dept: CARDIOLOGY | Facility: CLINIC | Age: 74
End: 2019-03-21
Payer: MEDICARE

## 2019-03-21 ENCOUNTER — CLINICAL SUPPORT (OUTPATIENT)
Dept: PULMONOLOGY | Facility: CLINIC | Age: 74
End: 2019-03-21
Payer: MEDICARE

## 2019-03-21 VITALS
HEART RATE: 63 BPM | SYSTOLIC BLOOD PRESSURE: 128 MMHG | OXYGEN SATURATION: 95 % | HEIGHT: 68 IN | RESPIRATION RATE: 18 BRPM | WEIGHT: 251.13 LBS | BODY MASS INDEX: 38.06 KG/M2 | DIASTOLIC BLOOD PRESSURE: 76 MMHG

## 2019-03-21 VITALS
SYSTOLIC BLOOD PRESSURE: 130 MMHG | DIASTOLIC BLOOD PRESSURE: 74 MMHG | HEIGHT: 68 IN | WEIGHT: 251.31 LBS | BODY MASS INDEX: 38.09 KG/M2 | HEART RATE: 78 BPM

## 2019-03-21 DIAGNOSIS — J45.909 UNCOMPLICATED ASTHMA, UNSPECIFIED ASTHMA SEVERITY, UNSPECIFIED WHETHER PERSISTENT: Primary | ICD-10-CM

## 2019-03-21 DIAGNOSIS — Z95.1 S/P CABG X 2: ICD-10-CM

## 2019-03-21 DIAGNOSIS — E78.2 MIXED HYPERLIPIDEMIA: ICD-10-CM

## 2019-03-21 DIAGNOSIS — I25.10 CORONARY ARTERY DISEASE INVOLVING NATIVE CORONARY ARTERY OF NATIVE HEART WITHOUT ANGINA PECTORIS: ICD-10-CM

## 2019-03-21 DIAGNOSIS — M79.604 PAIN IN BOTH LOWER EXTREMITIES: ICD-10-CM

## 2019-03-21 DIAGNOSIS — I10 ESSENTIAL HYPERTENSION: ICD-10-CM

## 2019-03-21 DIAGNOSIS — J44.89 ASTHMA WITH COPD: ICD-10-CM

## 2019-03-21 DIAGNOSIS — K74.60 CIRRHOSIS OF LIVER WITHOUT ASCITES, UNSPECIFIED HEPATIC CIRRHOSIS TYPE: ICD-10-CM

## 2019-03-21 DIAGNOSIS — I27.20 PULMONARY HYPERTENSION: ICD-10-CM

## 2019-03-21 DIAGNOSIS — M79.605 PAIN IN BOTH LOWER EXTREMITIES: ICD-10-CM

## 2019-03-21 DIAGNOSIS — I73.9 PVD (PERIPHERAL VASCULAR DISEASE): ICD-10-CM

## 2019-03-21 DIAGNOSIS — J45.909 UNCOMPLICATED ASTHMA, UNSPECIFIED ASTHMA SEVERITY, UNSPECIFIED WHETHER PERSISTENT: ICD-10-CM

## 2019-03-21 DIAGNOSIS — I27.20 PULMONARY HYPERTENSION: Primary | ICD-10-CM

## 2019-03-21 DIAGNOSIS — E11.9 TYPE 2 DIABETES MELLITUS WITHOUT COMPLICATION, WITHOUT LONG-TERM CURRENT USE OF INSULIN: ICD-10-CM

## 2019-03-21 DIAGNOSIS — G47.33 OSA ON CPAP: Chronic | ICD-10-CM

## 2019-03-21 DIAGNOSIS — Z01.810 PREOP CARDIOVASCULAR EXAM: ICD-10-CM

## 2019-03-21 DIAGNOSIS — I73.9 CLAUDICATION: ICD-10-CM

## 2019-03-21 DIAGNOSIS — I85.00 ESOPHAGEAL VARICES WITHOUT BLEEDING, UNSPECIFIED ESOPHAGEAL VARICES TYPE: ICD-10-CM

## 2019-03-21 DIAGNOSIS — J42 CHRONIC WHEEZY BRONCHITIS: Primary | ICD-10-CM

## 2019-03-21 PROBLEM — N39.0 URINARY TRACT INFECTION WITH HEMATURIA: Status: RESOLVED | Noted: 2018-06-22 | Resolved: 2019-03-21

## 2019-03-21 PROBLEM — R31.9 URINARY TRACT INFECTION WITH HEMATURIA: Status: RESOLVED | Noted: 2018-06-22 | Resolved: 2019-03-21

## 2019-03-21 PROBLEM — R33.9 URINARY RETENTION: Status: RESOLVED | Noted: 2018-06-22 | Resolved: 2019-03-21

## 2019-03-21 LAB
BRPFT: ABNORMAL
FEF 25 75 CHG: 44.1 %
FEF 25 75 LLN: 0.93
FEF 25 75 POST REF: 97.1 %
FEF 25 75 PRE REF: 67.4 %
FEF 25 75 REF: 2.21
FET100 CHG: -36.8 %
FEV1 CHG: 4.1 %
FEV1 FVC CHG: 7.2 %
FEV1 FVC LLN: 62
FEV1 FVC POST REF: 101.9 %
FEV1 FVC PRE REF: 95.1 %
FEV1 FVC REF: 76
FEV1 LLN: 2.09
FEV1 POST REF: 85.6 %
FEV1 PRE REF: 82.3 %
FEV1 REF: 2.93
FEV6 CHG: 3.3 %
FEV6 LLN: 2.93
FEV6 POST REF: 83 %
FEV6 POST: 3.15 L (ref 2.93–4.65)
FEV6 PRE REF: 80.3 %
FEV6 PRE: 3.04 L (ref 2.93–4.65)
FEV6 REF: 3.79
FVC CHG: -2.9 %
FVC LLN: 2.86
FVC POST REF: 83.6 %
FVC PRE REF: 86.1 %
FVC REF: 3.88
PEF CHG: -9.6 %
PEF LLN: 5.54
PEF POST REF: 119.5 %
PEF PRE REF: 132.1 %
PEF REF: 7.73
POST FEF 25 75: 2.15 L/S (ref 0.93–3.5)
POST FET 100: 9.41 SEC
POST FEV1 FVC: 77.36 % (ref 62.08–89.75)
POST FEV1: 2.51 L (ref 2.09–3.77)
POST FVC: 3.25 L (ref 2.86–4.9)
POST PEF: 9.24 L/S (ref 5.54–9.93)
PRE FEF 25 75: 1.49 L/S (ref 0.93–3.5)
PRE FET 100: 14.88 SEC
PRE FEV1 FVC: 72.18 % (ref 62.08–89.75)
PRE FEV1: 2.41 L (ref 2.09–3.77)
PRE FVC: 3.34 L (ref 2.86–4.9)
PRE PEF: 10.22 L/S (ref 5.54–9.93)

## 2019-03-21 PROCEDURE — 1101F PR PT FALLS ASSESS DOC 0-1 FALLS W/OUT INJ PAST YR: ICD-10-PCS | Mod: CPTII,S$GLB,, | Performed by: INTERNAL MEDICINE

## 2019-03-21 PROCEDURE — 99214 OFFICE O/P EST MOD 30 MIN: CPT | Mod: S$GLB,,, | Performed by: INTERNAL MEDICINE

## 2019-03-21 PROCEDURE — 1101F PT FALLS ASSESS-DOCD LE1/YR: CPT | Mod: CPTII,S$GLB,, | Performed by: INTERNAL MEDICINE

## 2019-03-21 PROCEDURE — 93005 EKG 12-LEAD: ICD-10-PCS | Mod: S$GLB,,, | Performed by: INTERNAL MEDICINE

## 2019-03-21 PROCEDURE — 3078F DIAST BP <80 MM HG: CPT | Mod: CPTII,S$GLB,, | Performed by: INTERNAL MEDICINE

## 2019-03-21 PROCEDURE — 3075F PR MOST RECENT SYSTOLIC BLOOD PRESS GE 130-139MM HG: ICD-10-PCS | Mod: CPTII,S$GLB,, | Performed by: INTERNAL MEDICINE

## 2019-03-21 PROCEDURE — 3075F SYST BP GE 130 - 139MM HG: CPT | Mod: CPTII,S$GLB,, | Performed by: INTERNAL MEDICINE

## 2019-03-21 PROCEDURE — 99999 PR PBB SHADOW E&M-EST. PATIENT-LVL III: CPT | Mod: PBBFAC,,, | Performed by: INTERNAL MEDICINE

## 2019-03-21 PROCEDURE — 93005 ELECTROCARDIOGRAM TRACING: CPT | Mod: S$GLB,,, | Performed by: INTERNAL MEDICINE

## 2019-03-21 PROCEDURE — 99214 OFFICE O/P EST MOD 30 MIN: CPT | Mod: 25,S$GLB,, | Performed by: INTERNAL MEDICINE

## 2019-03-21 PROCEDURE — 3074F SYST BP LT 130 MM HG: CPT | Mod: CPTII,S$GLB,, | Performed by: INTERNAL MEDICINE

## 2019-03-21 PROCEDURE — 99999 PR PBB SHADOW E&M-EST. PATIENT-LVL V: ICD-10-PCS | Mod: PBBFAC,,, | Performed by: INTERNAL MEDICINE

## 2019-03-21 PROCEDURE — 99999 PR PBB SHADOW E&M-EST. PATIENT-LVL V: CPT | Mod: PBBFAC,,, | Performed by: INTERNAL MEDICINE

## 2019-03-21 PROCEDURE — 99214 PR OFFICE/OUTPT VISIT, EST, LEVL IV, 30-39 MIN: ICD-10-PCS | Mod: S$GLB,,, | Performed by: INTERNAL MEDICINE

## 2019-03-21 PROCEDURE — 3044F HG A1C LEVEL LT 7.0%: CPT | Mod: CPTII,S$GLB,, | Performed by: INTERNAL MEDICINE

## 2019-03-21 PROCEDURE — 94060 EVALUATION OF WHEEZING: CPT | Mod: S$GLB,,, | Performed by: INTERNAL MEDICINE

## 2019-03-21 PROCEDURE — 93010 ELECTROCARDIOGRAM REPORT: CPT | Mod: S$GLB,,, | Performed by: INTERNAL MEDICINE

## 2019-03-21 PROCEDURE — 3078F PR MOST RECENT DIASTOLIC BLOOD PRESSURE < 80 MM HG: ICD-10-PCS | Mod: CPTII,S$GLB,, | Performed by: INTERNAL MEDICINE

## 2019-03-21 PROCEDURE — 99999 PR PBB SHADOW E&M-EST. PATIENT-LVL III: ICD-10-PCS | Mod: PBBFAC,,, | Performed by: INTERNAL MEDICINE

## 2019-03-21 PROCEDURE — 93010 EKG 12-LEAD: ICD-10-PCS | Mod: S$GLB,,, | Performed by: INTERNAL MEDICINE

## 2019-03-21 PROCEDURE — 3074F PR MOST RECENT SYSTOLIC BLOOD PRESSURE < 130 MM HG: ICD-10-PCS | Mod: CPTII,S$GLB,, | Performed by: INTERNAL MEDICINE

## 2019-03-21 PROCEDURE — 99214 PR OFFICE/OUTPT VISIT, EST, LEVL IV, 30-39 MIN: ICD-10-PCS | Mod: 25,S$GLB,, | Performed by: INTERNAL MEDICINE

## 2019-03-21 PROCEDURE — 94060 PR EVAL OF BRONCHOSPASM: ICD-10-PCS | Mod: S$GLB,,, | Performed by: INTERNAL MEDICINE

## 2019-03-21 PROCEDURE — 3044F PR MOST RECENT HEMOGLOBIN A1C LEVEL <7.0%: ICD-10-PCS | Mod: CPTII,S$GLB,, | Performed by: INTERNAL MEDICINE

## 2019-03-21 NOTE — PROGRESS NOTES
"Subjective:       Patient ID: Erendira Pretty is a 73 y.o. male.    Chief Complaint: Asthma and Sleep Apnea    Mr Maria De Jesus Krishna is 73 years old  DELONTE on CPAP, Chr wheezy asthmatic bronchitis, Chr nasal congestion  Recent seen Dr Parkinson: planned sinus revision surgery  Ct sinus reviewed.PARANASAL SINUS DISEASE  Currently using CPAP.  No daytime sleepiness  Ep worth score 9.  ACT score 24.  Respiratory symptoms minimal,   Immunizations are current  Not on Oxygen  Not needed to use nebulizer  Facial heaviness, sinus headache and congestion  Seen Dr Acosta prior for voice hoarseness.  .      Asthma   There is no cough, shortness of breath or wheezing. Pertinent negatives include no headaches. His past medical history is significant for asthma.     Review of Systems   Constitutional: Negative for fatigue.   HENT: Negative for voice change and congestion.    Eyes: Negative.    Respiratory: Negative for cough, shortness of breath and wheezing.    Cardiovascular: Negative for leg swelling.   Genitourinary: Negative.    Endocrine: endocrine negative   Musculoskeletal: Negative.    Skin: Negative.    Gastrointestinal: Negative.    Neurological: Negative for headaches.   Psychiatric/Behavioral: Negative.        Objective:       Vitals:    03/21/19 1139   BP: 128/76   Pulse: 63   Resp: 18   SpO2: 95%   Weight: 113.9 kg (251 lb 1.7 oz)   Height: 5' 8" (1.727 m)     Physical Exam   Constitutional: He is oriented to person, place, and time. Vital signs are normal. He appears well-developed and well-nourished. He is obese.   HENT:   Head: Normocephalic.   Nose: No mucosal edema or rhinorrhea.   Mouth/Throat: Oropharynx is clear and moist. Mallampati Score: III.   Neck: Normal range of motion. Neck supple.   Cardiovascular: Normal rate, regular rhythm and intact distal pulses.   No murmur heard.  Pulmonary/Chest: Normal expansion, symmetric chest wall expansion, effort normal and breath sounds normal. He has no decreased breath sounds. He " has no rhonchi. He has no rales.   Abdominal: Soft. Bowel sounds are normal.   Musculoskeletal: Normal range of motion. He exhibits no edema.   Lymphadenopathy:     He has no cervical adenopathy.   Neurological: He is alert and oriented to person, place, and time. He has normal reflexes.   Skin: Skin is warm and dry.   Psychiatric: He has a normal mood and affect.   Nursing note and vitals reviewed.    Personal Diagnostic Review  CT Sinus  FINDINGS:  Bilateral postsurgical changes noted.  There is improved aeration of the left maxillary sinus with persistent least moderate mucosal thickening.  Frothy secretions noted as well.  Left maxillary sinus walls demonstrate sequela of chronic sinusitis.  Mild right maxillary sinus mucosal thickening noted.  There is near complete opacification of the ethmoid air cells.  Sphenoid sinuses are relatively clear.  Left frontal sinus mild mucosal thickening noted.    Prominent rightward nasal septal deviation noted.  Left-sided nadira bullosa present.  No large Alex cell.  No osseous destructive findings.  Mastoid air cells clear.      Impression       Paranasal sinus disease as above.           Spirometry Normal  FEV1: 2.41L ( 82.3%), FVC 3.34L( 86.1%)  FEV1/FVC 72    No flowsheet data found.      Assessment:       Problem List Items Addressed This Visit     DELONTE on CPAP (Chronic)     [place] score 9  Stable on CPAP 11cm wp  Need bring in machine validate adherence         Type 2 diabetes mellitus with post op hyperglycemia     Stable followed by PCP         Essential hypertension     Stable followed by PCP and cardiology         BMI 38.0-38.9,adult     Weight loss and lifestyle modification         PVD (peripheral vascular disease)    Hepatic cirrhosis     Stable followed by Dr Tomas         Asthma    Esophageal varices     Stable followed by Dr Tomas         Chronic wheezy bronchitis - Primary     ACT score 24  immunizations are current  No recent exacerbation  Med: SYMBICORT  and RESCUE albuterol  FEV1: 2.41L ( 82.3%), FVC 3.34L( 86.1%)  FEV1/FVC 72             Mixed hyperlipidemia    RESOLVED: Pulmonary hypertension           Plan:       Continue Symbicort: Vortex spacer   Adherence with spacer  Continue CPAP and download to validate adherence  Digital compliance monitoring  No pulmonary Contraindication for Sinus surgery  Take inhalers    Follow-up in about 6 months (around 9/21/2019), or download, , for Spirometry and CXR next visit.    This note was prepared using voice recognition system and is likely to have sound alike errors that may have been overlooked even after proof reading.  Please call me with any questions    Discussed diagnosis, its evaluation, treatment and usual course. All questions answered.    Thank you for the courtesy of participating in the care of this patient    Rishi Molina MD

## 2019-03-21 NOTE — ASSESSMENT & PLAN NOTE
ACT score 24  immunizations are current  No recent exacerbation  Med: SYMBICORT and RESCUE albuterol  FEV1: 2.41L ( 82.3%), FVC 3.34L( 86.1%)  FEV1/FVC 72

## 2019-03-21 NOTE — PROGRESS NOTES
Subjective:   Patient ID:  Erendira Pretty is a 73 y.o. male who presents for follow-up of Pre-op Exam  Patient denies CP, angina or anginal equivalent.Pt planned for sinus surgery.     Hypertension   This is a chronic problem. The current episode started more than 1 year ago. The problem is controlled. Pertinent negatives include no chest pain, palpitations or shortness of breath. Past treatments include beta blockers and diuretics. The current treatment provides moderate improvement. There are no compliance problems.    Hyperlipidemia   This is a chronic problem. The current episode started more than 1 year ago. The problem is controlled. Pertinent negatives include no chest pain or shortness of breath. The current treatment provides mild improvement of lipids. Compliance problems include medication side effects.    Coronary Artery Disease   Presents for follow-up visit. Pertinent negatives include no chest pain, chest pressure, chest tightness, dizziness, leg swelling, muscle weakness, palpitations, shortness of breath or weight gain. Risk factors include hyperlipidemia. The symptoms have been stable. Compliance with diet is variable. Compliance with exercise is variable. Compliance with medications is good.       Review of Systems   Constitution: Negative. Negative for weight gain.   HENT: Negative.    Eyes: Negative.    Cardiovascular: Negative.  Negative for chest pain, leg swelling and palpitations.   Respiratory: Negative.  Negative for chest tightness and shortness of breath.    Endocrine: Negative.    Hematologic/Lymphatic: Negative.    Skin: Negative.    Musculoskeletal: Negative for muscle weakness.   Gastrointestinal: Negative.    Genitourinary: Negative.    Neurological: Negative.  Negative for dizziness.   Psychiatric/Behavioral: Negative.    Allergic/Immunologic: Negative.      Family History   Problem Relation Age of Onset    Heart disease Mother     Heart disease Father     Heart disease Brother      Colon cancer Neg Hx      Past Medical History:   Diagnosis Date    Aortic stenosis     Asthma     BPH (benign prostatic hyperplasia)     CAD (coronary artery disease)     40% lesion ;lazo    Cirrhosis     Claudication 2014    Colon polyp     COPD (chronic obstructive pulmonary disease)     ED (erectile dysfunction)     Encounter for blood transfusion     Ex-smoker     Hearing loss NEC     Hepatitis C     Cured;reji brown     Hyperlipidemia     Hypertension     Hypothyroid     s/p tx graves    DELONTE on CPAP     Osteoarthritis     PVD (peripheral vascular disease)     Type 2 diabetes mellitus      Social History     Socioeconomic History    Marital status:      Spouse name: YAEL    Number of children: 2    Years of education: Not on file    Highest education level: Not on file   Social Needs    Financial resource strain: Not on file    Food insecurity - worry: Not on file    Food insecurity - inability: Not on file    Transportation needs - medical: Not on file    Transportation needs - non-medical: Not on file   Occupational History    Not on file   Tobacco Use    Smoking status: Former Smoker     Packs/day: 0.50     Years: 4.00     Pack years: 2.00     Last attempt to quit: 1972     Years since quittin.8    Smokeless tobacco: Former User     Types: Chew     Quit date: 1976   Substance and Sexual Activity    Alcohol use: Yes     Alcohol/week: 1.2 oz     Types: 2 Cans of beer per week     Comment: daily  No alcohol prior to surgery    Drug use: No    Sexual activity: Yes     Partners: Female   Other Topics Concern    Not on file   Social History Narrative    . Lives with spouse. 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      Current Outpatient Medications on File Prior to Visit   Medication Sig Dispense Refill    ACCU-CHEK GRACE PLUS METER Misc AS DIRECTED  0    ACCU-CHEK GRACE PLUS TEST  "STRP Strp TEST THREE TIMES A  strip 11    acetaminophen (TYLENOL) 650 MG TbSR Take 1 tablet (650 mg total) by mouth every 8 (eight) hours.  0    albuterol-ipratropium (DUO-NEB) 2.5 mg-0.5 mg/3 mL nebulizer solution Take 3 mLs by nebulization 2 (two) times daily. Rescue 120 vial 5    azelastine (ASTELIN) 137 mcg (0.1 %) nasal spray 2 sprays (274 mcg total) by Nasal route 2 (two) times daily. 30 mL 6    budesonide-formoterol 160-4.5 mcg (SYMBICORT) 160-4.5 mcg/actuation HFAA INHALE 2 PUFFS INTO THE LUNGS TWO TIMES A DAY 10.2 g 11    docusate sodium (COLACE) 100 MG capsule Take 1 capsule (100 mg total) by mouth 2 (two) times daily. 60 capsule 0    finasteride (PROSCAR) 5 mg tablet Take 1 tablet (5 mg total) by mouth once daily. 30 tablet 11    fluticasone (FLONASE) 50 mcg/actuation nasal spray 2 sprays (100 mcg total) by Each Nare route once daily. 16 g 11    HUMALOG KWIKPEN INSULIN 100 unit/mL pen INJECT 3-4 UNITS INTO THE SKIN THREE TIMES DAILY BEFORE MEALS. 3 mL 11    insulin glargine (LANTUS SOLOSTAR) 100 unit/mL (3 mL) InPn pen Inject 40 Units into the skin once daily. 5 Syringe 6    lancets (ACCU-CHEK FASTCLIX LANCET DRUM) Misc TEST TWO TIMES A  each 5    levocetirizine (XYZAL) 5 MG tablet Take 1 tablet (5 mg total) by mouth every evening. 30 tablet 11    metoprolol succinate (TOPROL-XL) 50 MG 24 hr tablet Take 1 tablet (50 mg total) by mouth 2 (two) times daily. 60 tablet 11    montelukast (SINGULAIR) 10 mg tablet Take 1 tablet (10 mg total) by mouth once daily. 30 tablet 11    OMEGA-3 FATTY ACIDS/DHA/EPA (MEGARED PLANT-OMEGA-3 ORAL) Take 1 capsule by mouth once daily.      pen needle, diabetic (BD INSULIN PEN NEEDLE UF MINI) 31 gauge x 3/16" Ndle USE AS DIRECTED 100 each 11    polyethylene glycol (GLYCOLAX) 17 gram/dose powder Take 17 g ( 1 capful) by mouth once daily. 527 g 0    RABEprazole (ACIPHEX) 20 mg tablet Take 20 mg by mouth once daily.      RABEprazole (ACIPHEX) 20 mg " tablet TAKE 1 TABLET BY MOUTH TWO TIMES A DAY (Patient taking differently: TAKE 1 TABLET BY MOUTH ONE TIME A DAY) 60 tablet 11    SYNTHROID 137 mcg Tab tablet TAKE 2 TABLETS BY MOUTH BEFORE BREAKFAST. 60 tablet 5    aspirin (ECOTRIN) 81 MG EC tablet Take 1 tablet (81 mg total) by mouth once daily.  0    clopidogrel (PLAVIX) 75 mg tablet Take 1 tablet (75 mg total) by mouth once daily. 30 tablet 11    FLUAD 6569-7662, 65 YR UP,,PF, 45 mcg (15 mcg x 3)/0.5 mL Syrg       furosemide (LASIX) 40 MG tablet Take 1 tablet (40 mg total) by mouth once daily. And as needed for 14 days 40 tablet 11    GLUCOSAMINE HCL/CHONDR ROSARIO A NA (OSTEO BI-FLEX ORAL) Take 1 tablet by mouth 2 (two) times daily.       milk thistle 175 mg tablet Take 175 mg by mouth once daily.      MV,CA,IRON,MIN/FA/PHYTOSTEROL (CENTRUM SPECIALIST HEART ORAL) Take 1 tablet by mouth once daily.       niacin, inositol niacinate, (NIACIN FLUSH FREE ORAL) Take 500 mg by mouth once daily.      UNABLE TO FIND Flush Free Niacin otc, take 2000 mg by mouth a day.       No current facility-administered medications on file prior to visit.      Review of patient's allergies indicates:   Allergen Reactions    Lipitor [atorvastatin] Other (See Comments)     Muscle aches and pains as well as fatigue.     Iodinated contrast- oral and iv dye Hives     Tachycardia    Omeprazole Hives and Itching    Prilosec [omeprazole magnesium] Hives and Itching       Objective:     Physical Exam   Constitutional: He is oriented to person, place, and time. He appears well-developed and well-nourished.   HENT:   Head: Normocephalic and atraumatic.   Eyes: Conjunctivae are normal. Pupils are equal, round, and reactive to light.   Neck: Normal range of motion. Neck supple.   Cardiovascular: Normal rate, regular rhythm, normal heart sounds and intact distal pulses.   Pulmonary/Chest: Effort normal and breath sounds normal.   Abdominal: Soft. Bowel sounds are normal.   Neurological: He  is alert and oriented to person, place, and time.   Skin: Skin is warm and dry.   Psychiatric: He has a normal mood and affect.   Nursing note and vitals reviewed.      Assessment:     1. Pulmonary hypertension    2. Essential hypertension    3. Coronary artery disease involving native coronary artery of native heart without angina pectoris    4. PVD (peripheral vascular disease)    5. Claudication    6. S/P CABG x 2    7. Mixed hyperlipidemia    8. DELONTE on CPAP    9. Preop cardiovascular exam        Plan:     Pulmonary hypertension    Essential hypertension    Coronary artery disease involving native coronary artery of native heart without angina pectoris    PVD (peripheral vascular disease)    Claudication    S/P CABG x 2    Mixed hyperlipidemia    DELONTE on CPAP    Preop cardiovascular exam    Pt is cleared for surgery at moderate CV risk      Continue metoprolol, lasix-htn  Continue asa- cad  Vascular screening   check lipids

## 2019-03-21 NOTE — LETTER
March 21, 2019      Jose Wren MD  [address] [place] [address]           [facility]  [address]  [place] [address]  Phone: [phone]  Fax: [phone]          Patient: Erendira Pretty   MR Number: [id number]   YOB: 1945   Date of Visit: 3/21/2019       Dear Dr. Jose Wren:    Thank you for referring Erendira Pretty to me for evaluation. Attached you will find relevant portions of my assessment and plan of care.    If you have questions, please do not hesitate to call me. I look forward to following Erendira Pretty along with you.    Sincerely,    Rishi Molina MD    Enclosure  CC:  No Recipients    If you would like to receive this communication electronically, please contact [email or web address] or [phone] to request more information on [employer] Link access.    For providers and/or their staff who would like to refer a patient to [facility], please contact us through our one-stop-shop provider referral line, [facility], at [phone].    If you feel you have received this communication in error or would no longer like to receive these types of communications, please e-mail [email or web address]

## 2019-03-25 ENCOUNTER — LAB VISIT (OUTPATIENT)
Dept: LAB | Facility: HOSPITAL | Age: 74
End: 2019-03-25
Attending: INTERNAL MEDICINE
Payer: MEDICARE

## 2019-03-25 DIAGNOSIS — E78.2 MIXED HYPERLIPIDEMIA: ICD-10-CM

## 2019-03-25 LAB
CHOLEST SERPL-MCNC: 185 MG/DL (ref 120–199)
CHOLEST/HDLC SERPL: 5.1 {RATIO} (ref 2–5)
HDLC SERPL-MCNC: 36 MG/DL (ref 40–75)
HDLC SERPL: 19.5 % (ref 20–50)
LDLC SERPL CALC-MCNC: 89.2 MG/DL (ref 63–159)
NONHDLC SERPL-MCNC: 149 MG/DL
TRIGL SERPL-MCNC: 299 MG/DL (ref 30–150)

## 2019-03-25 PROCEDURE — 80061 LIPID PANEL: CPT

## 2019-03-25 PROCEDURE — 36415 COLL VENOUS BLD VENIPUNCTURE: CPT | Mod: PO

## 2019-03-26 ENCOUNTER — TELEPHONE (OUTPATIENT)
Dept: OTOLARYNGOLOGY | Facility: CLINIC | Age: 74
End: 2019-03-26

## 2019-03-26 ENCOUNTER — ANESTHESIA EVENT (OUTPATIENT)
Dept: SURGERY | Facility: HOSPITAL | Age: 74
End: 2019-03-26
Payer: MEDICARE

## 2019-03-26 NOTE — H&P
Chief Complaint:  Other (review scan)     HPI:  Erendira is a 73 y.o. male who I was asked to see in consultation for evaluation of chronic throat discomfort:       Patient was initially seen in January 9, 2017.  He reported significant nasal obstruction worse on the left than the right.  He also reported facial pain, pressure and discomfort.  He had had nasal polypectomy performed twice with the last one in about the year 2002.  He had never had ALLERGY testing performed.  He reported moderate ALLERGIC symptoms of nasal congestion, nasal itchiness and rhinorrhea.  No imaging had been performed prior to seeing me.  He had been started on Augmentin shortly before his initial clinic visit and was having significant gastrointestinal issues with this.  He does have asthma.  Nasal endoscopy revealed bilateral nasal polyps with a very large polyp on the left side.  This was removed.  I placed him on levofloxacin, Nasonex twice a day and Astelin twice a day.  He was not given steroids due to his diabetes.       He return to clinic February 13, 2017.  He reports that his nasal obstruction is significantly better.  Additionally he is no longer having facial pain or pressure.  He denies significant nasal drainage.  He is having a persistent cough that is been lingering for about 2 months now.  He was on Singulair in the past but has been off of that for some time.  He is having to use his inhalers, specifically his albuterol inhaler multiple times daily.  ALLERGY testing by blood work was performed and revealed numerous class III ALLERGIES.  Erendira returned April 3, 2017.  I had directed the patient to use Nasonex, Astelin, Xyzal and Singulair.  He stopped using the nasal sprays about 1 month ago but continued oral medications.  Despite this, he reported that his breathing through the nose had dramatically improved.  He had no facial pressure or pain.  He had minimal nasal drainage.  His cough had resolved.  Overall he was  feeling very good.  Then plan at this point, was to resume Nasonex and continue with Xyzal and Singulair.      When he returned May 15, 2017, he had been using Nasonex, Xyzal and Singulair for his allergy regimen. Over the past week, he has had increased post nasal and cough.  The cough somewhat improved but the sore throat has gotten worse.  The post nasal drip has been worsening over the same period of time but may be mildly better in the past 2 days.  The sore throat is the most bothersome issue at this point and he has some difficulty swallowing.  Severity- moderate to severe. Quality- burning.  Modifying factors- none, no improvement with choraseptic spray.  Assoc ssx- nasal drainage, cough     Sept 18, 2017  He has been Xyzal regularly.  He uses Nasonex and Flonase PRN.  No pressure or pain.  No drainage. Feeling well.     December 3, 2018  Patient underwent coronary artery bypass surgery November 5, 2018.  Postoperatively, he had a fairly severe cough but this has been improving.  He has had some significant dysphonia with difficulty in projecting the voice.  He denies any pain with phonation.  He denies any significant shortness of breath.  He reports that the cough is dramatically better than a few weeks ago.  He has been using Xyzal and Singulair regularly but only using the Flonase as needed.  He discontinued the Astelin sometime ago.  He reports that he is having significant watery rhinorrhea as well as postnasal drip.  His cough is nonproductive.  He denies any significant exacerbating or relieving factors.     03/01/2019   He has had progressive nasal congestion and cough.  He asthma has been worsening.  He continues to use his allergy medications with good compliance.      Review of Systems:  -     Allergic/Immunologic: is allergic to lipitor [atorvastatin]; iodinated contrast- oral and iv dye; omeprazole; and prilosec [omeprazole magnesium]..  -     Constitutional: Current temp:          His meds,  allergies, medical, surgical, social & family histories were reviewed & updated:  -     He has a current medication list which includes the following prescription(s): accu-chek lxu plus meter, accu-chek lux plus test strp, acetaminophen, albuterol-ipratropium, aspirin, azelastine, budesonide-formoterol 160-4.5 mcg, clopidogrel, docusate sodium, finasteride, fluad 8636-4229 (65 yr up)(pf), fluticasone, glucosamine/chondr leach a sod, humalog kwikpen insulin, insulin, lancets, levocetirizine, metoprolol succinate, milk thistle, montelukast, multivit,iron,mins/folic acid, niacin (inositol niacinate), omega-3 fatty acids/dha/epa, pen needle, diabetic, polyethylene glycol, rabeprazole, synthroid, UNABLE TO FIND, and furosemide.  -     He  has a past medical history of Aortic stenosis, Asthma, BPH (benign prostatic hyperplasia), CAD (coronary artery disease), Cirrhosis, Claudication (4/9/2014), Colon polyp, ED (erectile dysfunction), Encounter for blood transfusion, Ex-smoker, Hearing loss NEC, Hepatitis C, Hyperlipidemia, Hypertension, Hypothyroid, DELONTE on CPAP, Osteoarthritis, PVD (peripheral vascular disease), and Type 2 diabetes mellitus.   -     He does not have any pertinent problems on file.   -     He  has a past surgical history that includes Knee surgery (Right); Trigger finger release (Left); Carpal tunnel release (Left); Elbow bursa surgery (Right, 2010); Rectal surgery (2012); Eye surgery; Cataract extraction w/ intraocular lens  implant, bilateral (2008); Cardiac catheterization; Colonoscopy (8/2013); Colonoscopy (N/A, 5/30/2016); Transurethral resection of prostate (TURP) without use of laser (N/A, 6/19/2018); Coronary Artery Bypass Graft (CABG) (N/A, 11/5/2018); Endoscopic harvest of vein (Left, 11/5/2018); Cardiac surgery; and Catheterization of both left and right heart (N/A, 11/2/2018).  -     He  reports that he quit smoking about 46 years ago. He has a 2.00 pack-year smoking history. He quit smokeless  "tobacco use about 42 years ago. His smokeless tobacco use included chew. He reports that he drinks about 1.2 oz of alcohol per week. He reports that he does not use drugs.  -     His family history includes Heart disease in his brother, father, and mother.  -     He is allergic to lipitor [atorvastatin]; iodinated contrast- oral and iv dye; omeprazole; and prilosec [omeprazole magnesium].     Objective:   Physical Exam:  Vitals:  Ht 5' 8" (1.727 m)   Wt 113.5 kg (250 lb 3.6 oz)   BMI 38.05 kg/m²   General appearance:  Well developed, well nourished     Eyes:  Extraocular motions intact, PERRL     Communication:  Significant dysphonia with difficulty sustaining pitch     Ears:  Otoscopy of external auditory canals and tympanic membranes was normal, clinical speech reception thresholds grossly intact, no mass/lesion of auricle.  Nose:  Polyps in nasal cavities bilaterally. No masses/lesions of external nose, nasal mucosa, septum, and turbinates were within normal limits.  Mouth:  No mass/lesion of lips, teeth, gums, hard/soft palate, tongue, tonsils, or oropharynx.     Cardiovascular:  No pedal edema; Radial Pulses +2      Neck & Lymphatics:  No cervical lymphadenopathy, no neck mass/crepitus/ asymmetry, trachea is midline, no thyroid enlargement/tenderness/mass.     Psych: Oriented x3,  Alert with normal mood and affect.            Respiration/Chest:  Symmetric expansion during respiration, normal respiratory effort.     Skin:  Warm and intact. No ulcerations of face, scalp, neck.    CT SINUSES WITHOUT CONTRAST    CLINICAL HISTORY:  Sinusitis, <=12w, uncomplicated;  Chronic pansinusitis    TECHNIQUE:  Axial low-dose images, coronal and sagittal reformations were performed through the paranasal sinuses.  Contrast was not administered.    COMPARISON:  09/28/2014    FINDINGS:  Bilateral postsurgical changes noted.  There is improved aeration of the left maxillary sinus with persistent least moderate mucosal " thickening.  Frothy secretions noted as well.  Left maxillary sinus walls demonstrate sequela of chronic sinusitis.  Mild right maxillary sinus mucosal thickening noted.  There is near complete opacification of the ethmoid air cells.  Sphenoid sinuses are relatively clear.  Left frontal sinus mild mucosal thickening noted.    Prominent rightward nasal septal deviation noted.  Left-sided nadira bullosa present.  No large Alex cell.  No osseous destructive findings.  Mastoid air cells clear.      Impression       Paranasal sinus disease as above.             Assessment & Plan:      Recommend revision sinus surgery     Open all sinuses bilaterally     Discussed need for chronic allergy management and debridements     Discussed recalcitrant nature of nasal polyps     We discussed at length the risk of sinus surgery including, but not limited to, recurrence of disease, bleeding, infection, septal perforation, tooth or cheek numbness, vision changes, orbital injury, CSF leak and changes in smell.  The patient had opportunity to ask questions and I answered all of them to their satisfaction.  We will proceed as planned.       Alejandro Parkinson MD.

## 2019-03-26 NOTE — TELEPHONE ENCOUNTER
Surgery arrival time of 7:30 am on 03/27/19 with Dr. Parkinson left in detail along with check in instructions along with reminder of NPO at midnight. Asked to return our call to confirm receipt of message with arrival time.

## 2019-03-27 ENCOUNTER — TELEPHONE (OUTPATIENT)
Dept: CARDIOLOGY | Facility: CLINIC | Age: 74
End: 2019-03-27

## 2019-03-27 ENCOUNTER — ANESTHESIA (OUTPATIENT)
Dept: SURGERY | Facility: HOSPITAL | Age: 74
End: 2019-03-27
Payer: MEDICARE

## 2019-03-27 ENCOUNTER — HOSPITAL ENCOUNTER (OUTPATIENT)
Facility: HOSPITAL | Age: 74
Discharge: HOME | End: 2019-03-27
Attending: OTOLARYNGOLOGY | Admitting: OTOLARYNGOLOGY
Payer: MEDICARE

## 2019-03-27 DIAGNOSIS — J32.4 CHRONIC PANSINUSITIS: Primary | ICD-10-CM

## 2019-03-27 LAB
POCT GLUCOSE: 157 MG/DL (ref 70–110)
POCT GLUCOSE: 160 MG/DL (ref 70–110)

## 2019-03-27 PROCEDURE — 88305 TISSUE EXAM BY PATHOLOGIST: CPT | Mod: 26,,, | Performed by: PATHOLOGY

## 2019-03-27 PROCEDURE — 88305 TISSUE SPECIMEN TO PATHOLOGY - SURGERY: ICD-10-PCS | Mod: 26,,, | Performed by: PATHOLOGY

## 2019-03-27 PROCEDURE — 25000003 PHARM REV CODE 250: Performed by: OTOLARYNGOLOGY

## 2019-03-27 PROCEDURE — 36000709 HC OR TIME LEV III EA ADD 15 MIN: Performed by: OTOLARYNGOLOGY

## 2019-03-27 PROCEDURE — 30520 REPAIR OF NASAL SEPTUM: CPT | Mod: ,,, | Performed by: OTOLARYNGOLOGY

## 2019-03-27 PROCEDURE — 37000008 HC ANESTHESIA 1ST 15 MINUTES: Performed by: OTOLARYNGOLOGY

## 2019-03-27 PROCEDURE — 31256 PR NASAL/SINUS ENDOSCOPY,OPEN MAXILL SINUS: ICD-10-PCS | Mod: 50,51,, | Performed by: OTOLARYNGOLOGY

## 2019-03-27 PROCEDURE — 36000708 HC OR TIME LEV III 1ST 15 MIN: Performed by: OTOLARYNGOLOGY

## 2019-03-27 PROCEDURE — 25000003 PHARM REV CODE 250: Performed by: ANESTHESIOLOGY

## 2019-03-27 PROCEDURE — 71000033 HC RECOVERY, INTIAL HOUR: Performed by: OTOLARYNGOLOGY

## 2019-03-27 PROCEDURE — 30520 PR REPAIR, NASAL SEPTUM: ICD-10-PCS | Mod: ,,, | Performed by: OTOLARYNGOLOGY

## 2019-03-27 PROCEDURE — 27201423 OPTIME MED/SURG SUP & DEVICES STERILE SUPPLY: Performed by: OTOLARYNGOLOGY

## 2019-03-27 PROCEDURE — 31276 NSL/SINS NDSC FRNT TISS RMVL: CPT | Mod: 50,51,, | Performed by: OTOLARYNGOLOGY

## 2019-03-27 PROCEDURE — 31276 PR NASAL/SINUS ENDOSCOPY,EXPLOR FRONTAL SINUS: ICD-10-PCS | Mod: 50,51,, | Performed by: OTOLARYNGOLOGY

## 2019-03-27 PROCEDURE — 31257 NSL/SINS NDSC TOT W/SPHENDT: CPT | Mod: 50,51,, | Performed by: OTOLARYNGOLOGY

## 2019-03-27 PROCEDURE — 63600175 PHARM REV CODE 636 W HCPCS: Performed by: NURSE ANESTHETIST, CERTIFIED REGISTERED

## 2019-03-27 PROCEDURE — 37000009 HC ANESTHESIA EA ADD 15 MINS: Performed by: OTOLARYNGOLOGY

## 2019-03-27 PROCEDURE — 31240 PR NASAL/SINUS ENDOSCOPY,RMV CONCHA BULL: ICD-10-PCS | Mod: 50,51,, | Performed by: OTOLARYNGOLOGY

## 2019-03-27 PROCEDURE — 71000015 HC POSTOP RECOV 1ST HR: Performed by: OTOLARYNGOLOGY

## 2019-03-27 PROCEDURE — 25000003 PHARM REV CODE 250: Performed by: NURSE ANESTHETIST, CERTIFIED REGISTERED

## 2019-03-27 PROCEDURE — 31256 EXPLORATION MAXILLARY SINUS: CPT | Mod: 50,51,, | Performed by: OTOLARYNGOLOGY

## 2019-03-27 PROCEDURE — 31257 PR ENDOSCOPY, NASAL/SINUS, W/ETHMOIDECTOMY/SPHENOIDOTOMY: ICD-10-PCS | Mod: 50,51,, | Performed by: OTOLARYNGOLOGY

## 2019-03-27 PROCEDURE — 88305 TISSUE EXAM BY PATHOLOGIST: CPT | Performed by: PATHOLOGY

## 2019-03-27 PROCEDURE — 31240 NSL/SNS NDSC CNCH BULL RESCJ: CPT | Mod: 50,51,, | Performed by: OTOLARYNGOLOGY

## 2019-03-27 RX ORDER — ACETAMINOPHEN 500 MG
1000 TABLET ORAL ONCE
Status: COMPLETED | OUTPATIENT
Start: 2019-03-27 | End: 2019-03-27

## 2019-03-27 RX ORDER — FENTANYL CITRATE 50 UG/ML
25 INJECTION, SOLUTION INTRAMUSCULAR; INTRAVENOUS EVERY 5 MIN PRN
Status: DISCONTINUED | OUTPATIENT
Start: 2019-03-27 | End: 2019-03-27 | Stop reason: HOSPADM

## 2019-03-27 RX ORDER — ONDANSETRON 2 MG/ML
4 INJECTION INTRAMUSCULAR; INTRAVENOUS DAILY PRN
Status: DISCONTINUED | OUTPATIENT
Start: 2019-03-27 | End: 2019-03-27 | Stop reason: HOSPADM

## 2019-03-27 RX ORDER — LIDOCAINE HYDROCHLORIDE 10 MG/ML
INJECTION INFILTRATION; PERINEURAL
Status: DISCONTINUED | OUTPATIENT
Start: 2019-03-27 | End: 2019-03-27

## 2019-03-27 RX ORDER — ROCURONIUM BROMIDE 10 MG/ML
INJECTION, SOLUTION INTRAVENOUS
Status: DISCONTINUED | OUTPATIENT
Start: 2019-03-27 | End: 2019-03-27

## 2019-03-27 RX ORDER — MIDAZOLAM HYDROCHLORIDE 1 MG/ML
INJECTION, SOLUTION INTRAMUSCULAR; INTRAVENOUS
Status: DISCONTINUED | OUTPATIENT
Start: 2019-03-27 | End: 2019-03-27

## 2019-03-27 RX ORDER — SODIUM CHLORIDE, SODIUM LACTATE, POTASSIUM CHLORIDE, CALCIUM CHLORIDE 600; 310; 30; 20 MG/100ML; MG/100ML; MG/100ML; MG/100ML
INJECTION, SOLUTION INTRAVENOUS CONTINUOUS
Status: ACTIVE | OUTPATIENT
Start: 2019-03-27

## 2019-03-27 RX ORDER — OXYCODONE AND ACETAMINOPHEN 5; 325 MG/1; MG/1
1 TABLET ORAL EVERY 4 HOURS PRN
Qty: 45 TABLET | Refills: 0 | Status: SHIPPED | OUTPATIENT
Start: 2019-03-27 | End: 2019-05-09

## 2019-03-27 RX ORDER — NEOSTIGMINE METHYLSULFATE 1 MG/ML
INJECTION, SOLUTION INTRAVENOUS
Status: DISCONTINUED | OUTPATIENT
Start: 2019-03-27 | End: 2019-03-27

## 2019-03-27 RX ORDER — HYDROCODONE BITARTRATE AND ACETAMINOPHEN 5; 325 MG/1; MG/1
1 TABLET ORAL EVERY 4 HOURS PRN
Status: DISCONTINUED | OUTPATIENT
Start: 2019-03-27 | End: 2019-03-27 | Stop reason: HOSPADM

## 2019-03-27 RX ORDER — ONDANSETRON 4 MG/1
4 TABLET, FILM COATED ORAL EVERY 8 HOURS PRN
Qty: 20 TABLET | Refills: 0 | Status: SHIPPED | OUTPATIENT
Start: 2019-03-27 | End: 2019-05-09

## 2019-03-27 RX ORDER — MEPERIDINE HYDROCHLORIDE 50 MG/ML
12.5 INJECTION INTRAMUSCULAR; INTRAVENOUS; SUBCUTANEOUS ONCE AS NEEDED
Status: DISCONTINUED | OUTPATIENT
Start: 2019-03-27 | End: 2019-03-27 | Stop reason: HOSPADM

## 2019-03-27 RX ORDER — OXYCODONE HYDROCHLORIDE 5 MG/1
5 TABLET ORAL ONCE AS NEEDED
Status: COMPLETED | OUTPATIENT
Start: 2019-03-27 | End: 2019-03-27

## 2019-03-27 RX ORDER — DEXAMETHASONE SODIUM PHOSPHATE 4 MG/ML
INJECTION, SOLUTION INTRA-ARTICULAR; INTRALESIONAL; INTRAMUSCULAR; INTRAVENOUS; SOFT TISSUE
Status: DISCONTINUED | OUTPATIENT
Start: 2019-03-27 | End: 2019-03-27

## 2019-03-27 RX ORDER — PHENYLEPHRINE HYDROCHLORIDE 10 MG/ML
INJECTION INTRAVENOUS
Status: DISCONTINUED | OUTPATIENT
Start: 2019-03-27 | End: 2019-03-27

## 2019-03-27 RX ORDER — PROPOFOL 10 MG/ML
VIAL (ML) INTRAVENOUS CONTINUOUS PRN
Status: DISCONTINUED | OUTPATIENT
Start: 2019-03-27 | End: 2019-03-27

## 2019-03-27 RX ORDER — SUCCINYLCHOLINE CHLORIDE 20 MG/ML
INJECTION INTRAMUSCULAR; INTRAVENOUS
Status: DISCONTINUED | OUTPATIENT
Start: 2019-03-27 | End: 2019-03-27

## 2019-03-27 RX ORDER — OXYMETAZOLINE HCL 0.05 %
SPRAY, NON-AEROSOL (ML) NASAL
Status: DISCONTINUED | OUTPATIENT
Start: 2019-03-27 | End: 2019-03-27 | Stop reason: HOSPADM

## 2019-03-27 RX ORDER — ONDANSETRON 2 MG/ML
INJECTION INTRAMUSCULAR; INTRAVENOUS
Status: DISCONTINUED | OUTPATIENT
Start: 2019-03-27 | End: 2019-03-27

## 2019-03-27 RX ORDER — PROPOFOL 10 MG/ML
VIAL (ML) INTRAVENOUS
Status: DISCONTINUED | OUTPATIENT
Start: 2019-03-27 | End: 2019-03-27

## 2019-03-27 RX ORDER — FENTANYL CITRATE 50 UG/ML
INJECTION, SOLUTION INTRAMUSCULAR; INTRAVENOUS
Status: DISCONTINUED | OUTPATIENT
Start: 2019-03-27 | End: 2019-03-27

## 2019-03-27 RX ORDER — GLYCOPYRROLATE 0.2 MG/ML
INJECTION INTRAMUSCULAR; INTRAVENOUS
Status: DISCONTINUED | OUTPATIENT
Start: 2019-03-27 | End: 2019-03-27

## 2019-03-27 RX ADMIN — SUCCINYLCHOLINE CHLORIDE 100 MG: 20 INJECTION, SOLUTION INTRAMUSCULAR; INTRAVENOUS at 11:03

## 2019-03-27 RX ADMIN — MIDAZOLAM 2 MG: 1 INJECTION INTRAMUSCULAR; INTRAVENOUS at 11:03

## 2019-03-27 RX ADMIN — NEOSTIGMINE METHYLSULFATE 4 MG: 1 INJECTION INTRAVENOUS at 01:03

## 2019-03-27 RX ADMIN — LIDOCAINE HYDROCHLORIDE 50 MG: 10 INJECTION, SOLUTION INFILTRATION; PERINEURAL at 11:03

## 2019-03-27 RX ADMIN — FENTANYL CITRATE 50 MCG: 50 INJECTION, SOLUTION INTRAMUSCULAR; INTRAVENOUS at 11:03

## 2019-03-27 RX ADMIN — ROBINUL 0.6 MG: 0.2 INJECTION INTRAMUSCULAR; INTRAVENOUS at 01:03

## 2019-03-27 RX ADMIN — PROPOFOL 100 MCG/KG/MIN: 10 INJECTION, EMULSION INTRAVENOUS at 11:03

## 2019-03-27 RX ADMIN — SODIUM CHLORIDE, SODIUM LACTATE, POTASSIUM CHLORIDE, AND CALCIUM CHLORIDE: 600; 310; 30; 20 INJECTION, SOLUTION INTRAVENOUS at 11:03

## 2019-03-27 RX ADMIN — ROCURONIUM BROMIDE 5 MG: 10 INJECTION, SOLUTION INTRAVENOUS at 11:03

## 2019-03-27 RX ADMIN — DEXAMETHASONE SODIUM PHOSPHATE 4 MG: 4 INJECTION, SOLUTION INTRA-ARTICULAR; INTRALESIONAL; INTRAMUSCULAR; INTRAVENOUS; SOFT TISSUE at 11:03

## 2019-03-27 RX ADMIN — ONDANSETRON 4 MG: 2 INJECTION, SOLUTION INTRAMUSCULAR; INTRAVENOUS at 11:03

## 2019-03-27 RX ADMIN — ACETAMINOPHEN 1000 MG: 500 TABLET ORAL at 08:03

## 2019-03-27 RX ADMIN — ROCURONIUM BROMIDE 25 MG: 10 INJECTION, SOLUTION INTRAVENOUS at 11:03

## 2019-03-27 RX ADMIN — PHENYLEPHRINE HYDROCHLORIDE 100 MCG: 10 INJECTION INTRAVENOUS at 12:03

## 2019-03-27 RX ADMIN — PROPOFOL 170 MG: 10 INJECTION, EMULSION INTRAVENOUS at 11:03

## 2019-03-27 RX ADMIN — OXYCODONE HYDROCHLORIDE 5 MG: 5 TABLET ORAL at 02:03

## 2019-03-27 NOTE — DISCHARGE INSTRUCTIONS
PLEASE PERFORM SINUS RINSES 5-7 TIMES DAILY UNTIL YOUR FIRST POSTOPERATIVE VISIT.          DIRECTIONS FOR SINUS SALINE RINSE To see demonstration: Enter http://www.youtube.com/watch?v=[email or web address] into the browser address box, or go to You tube, and under the search box, enter sinus rinse. Click on [facility] Sinus Rinse Video    Step 1    Step 1 Please wash your hands. Fill the clean bottle with the designated volume of warm distilled water, filtered water or previously boiled water. You may warm the water in a microwave but we recommend that you warm it in increments of five seconds. This is to avoid excessive heating of the water and damage to the device or scalding your nasal passage.    Step 2    Step 2 Cut the SINUS RINSE mixture packet at the corner and pour its contents into the bottle. Tighten the cap & tube on the bottle securely, place one finger over the tip of the cap and shake the bottle gently to dissolve the mixture.      Step 3    Step 3 Standing in front of a sink, bend forward to your comfort level and tilt your head down. Keeping your mouth open without holding your breath, place cap snugly against your nasal passage and SQUEEZE BOTTLE GENTLY until the solution starts draining from the OPPOSITE nasal passage or from your mouth. Keep squeezing the bottle GENTLY until at least 1/4 to 1/2 (60 to 120 mL) of the bottle is used for a proper rinse. Do not swallow the solution.    Step 4    Blow your nose gently, without pinching your nose completely because this will apply pressure on the    eardrums. If tolerable, sniff in any residual solution remaining in the nasal passage once or twice prior to    blowing your nose as this may clean out the posterior nasopharyngeal area (the area at the back of your    nasal passage). Some solution will reach the back of the throat, so please spit it out. To help improve    drainage of any residual solution, blow your nose gently while tilting your head to the  opposite side of    the nasal passage that you just rinsed.    Step 5    Now repeat steps 3 & 4 for your other nasal passage.    Step 6 Air dry the Sinus Rinse bottle, cap, and tube on a clean paper towel, a glass plate to store the bottle cap and tube. If there is any solution leftover, please discard it. We recommend you make a fresh solution each time you rinse. Rinse 5 times each day, OR as directed by your physician. Warnings: DO NOT RINSE IF NASAL PASSAGE IS COMPLETELY BLOCKED OR IF YOU HAVE AN EAR INFECTION OR BLOCKED EARS. If you have had ear surgery, please contact your physician prior to irrigation. If you experience any pressure in your ears, stop the rinse and get further directions from your physician or contact our office during regular business hours. To avoid any ear discomfort: Heat the solution to lukewarm, do not use hot, boiling or cold water. Keep your mouth open. Do not hold your breath and if possible make the sound RIVER....RIVER... Make sure to take the position as shown. Gently squeeze 1/4 of the bottle at a time (60mL / 2 ounces). Stop the rinse if you feel any solution sensation near the ears. You may rinse with a partially blocked nasal passage. Please do not use for any other purposes. Please rinse at least ONE HOUR PRIOR to bedtime, in order to avoid any residual solution dripping in the throat.    >> Before using the SINUS RINSE kit, please inspect the cap, tube and bottle carefully for wear and    tear. If any of the components appear discolored or cracked, please contact [employer] to obtain a    replacement. You must follow the cleaning instructions provided in this brochure or cleaning instruction    card prior to each use.    >> The SINUS RINSE bottle and mixture are to be used only for nasalirrigation. Do not use for any other    purposes.    >> We recommend that you use the rinse ONE HOUR PRIOR to bedtime in order to avoid any residual    solution dripping in the throat.    Tips to  avoid ear discomfort while rinsing    If you have had ear surgery, please contact your physician prior to irrigation. Do not use if you have an    ear infection or blocked ears. Rinse with lukewarm water. Keep your mouth open. Do not hold your    breath while rinsing. While rinsing, make sure to tilt your. Gently squeeze the bottle while rinsing; do    not squeeze the bottle very forcefully. Stop the rinse if you feel a sensation of fluid near your ears.    Tips to avoid unexpected drainage after rinsing    In rare situations, especially if you have had sinus surgery, the saline solution can pool in the sinus    cavities and nasal passages and then drip from your nostrils hours after rinsing. To avoid this harmless    but annoying inconvenience, take one extra step after rinsing: lean forward, tilt your head sideways and    gently blow your nose. Then, tilt your head to the other side and blow again. You may need to repeat    this several times. This will help rid your nasal passages of any excess mucus and remaining saline    solution. If you find yourself experiencing delayed drainage often, do not rinse right before leaving your    house or going to bed.              ENDOSCOPIC SINUS SURGERY POST-OP INSTRUCTIONS    Rarely, is nasal packing (absorbent bandage) required (less than 5% of the time). This will be remove before you go home, or in some cases, on your first post-up appointment.    Slight trickling of blood in the back of your throat, or on your drip pad in front of your nose, and/or crusting of secretions for the first few days, is to be expected. You should change your drip pad periodically during the day as needed. This may be necessary for the first 3-5 days after surgery. You may also clean the hair-baring area of the nose daily, as needed, using a Q-tip and hydrogen peroxide. Keep your fingers out of your nose.    Diet: You may experience nausea after surgery. Avoid heavy meals on that day. Wait 24  hours to gradually resume your normal diet. Drink plenty of fluids to maintain hydration (6-8 glasses daily minimum). Avoid alcohol or caffeine the first week after surgery.    Restricted Activities: the first week after surgery, your sinuses will feel very full and congested, this is due to the accumulation of dried blood and/or mucus crust. Do not plan to blow your nose, bend over or lift heavy objects (over 10 lbs) for the first week after surgery. This may result in a nose bleed. Rest and do not exert or overheat yourself with exercise or other physical activity.    Post-Operative pain: pain medication (Percocet or Acetaminophen with codeine) and/or anti-inflammatory medication (prednisone) may be ordered for significant post-operative pain, (FOR HEADACHES). You can still take extra-strength acetaminophen if the pain is not too uncomfortable, to avoid the occasional side-affects of nausea, constipation, or grogginess, associated with all narcotics. Use stool softener such as: dulcolax or Miralax to prevent constipation. Do not drive or handle heavy machinery while on narcotics. Throat discomfort is not too uncommon after endotracheal intubation during general anesthesia. This should resolve on it's own within 5 days. Throat lozenges or gargles tend to soothe the discomfort. Plane travel is allowed after your first post-op visit.    Do not take aspirin or anti-coagulant therapy 7-10 days after surgery unless otherwise indicated by your surgeon at time of discharge. If you are on Coumadin you will likely restart your normal dosage 24 hours after surgery. However, check with your doctor first.    Post-operative Office Visits: The first visit will be 7-10 days after surgery. At this time the physician will clean your sinus cavity by removing dried blood and/or mucus crusting to promote healing, to minimize the chance for secondary bacterial infection, and relieves congestion and/or headaches. It is suggested that  you take a couple of narcotics pills 1 hour prior to your first post op appointment. This is especially important for your first debridement, when your internal nose is still slightly swollen and tender.    If indicated by the physician, patient may start nasal irrigations, such as Sinus Rinse, or resume any other previous treatment.    Signs and symptoms that MUST be reported to the physician/nurse immediately by [phone]. *Fever over 102 degree F. *Persistent bleeding with more that ¼ cup of bright red blood within a short period of time (half hour). *Severe pain unrelieved by prescribed medication. *Mental or visual changes (confusion, slurred speech, double- vision, visual loss).    * If you experience difficulty breathing, shortness of breath, or severe bleeding, call 911 or go to the nearest emergency room.

## 2019-03-27 NOTE — TELEPHONE ENCOUNTER
03/27 Called and left v/m I would call him tomorrow after I get back from clinic, will be in [place]. Cm    ----- Message from Frank Gallardo MD sent at 3/27/2019  6:50 AM CDT -----  Lipids reviwed, LDL at goal, Tg high , increase niacin to 1000 mg q day

## 2019-03-27 NOTE — OP NOTE
Operative Note       Surgery Date: 3/27/2019     Surgeon(s) and Role:     * Alejandro Parkinson MD - Primary    Assistant:  None    Implants:  None    Pre-op Diagnosis:  Chronic pansinusitis [J32.4]; Nasal polyposis; septal deviation    Post-op Diagnosis: same    Anesthesia: GETA    Technical Procedures Used:     -  bilateral Frontal sinusotomy, with or without removal of tissue   -  bilateral Maxillary antrostomy without removal of tissue   -  bilateral Sphenoid sinusotomy without removal of tissue   -  Bilateral total ethmoidectomy  -  Left Keila bullosa resection   -  Septoplasty       Procedure in Detail/Findings:    Endoscopic Sinonasal Exam Findings at TIME OF SURGERY:  -     Sinuses examined: bilateral maxillary, bilateral ethmoid anterior and posterior, bilateral sphenoid and bilateral frontal            The right sinus(es) have rebecca polyps limited to within sinus(es)            The left sinus(es) have rebecca polyps limited to within sinus(es)  -     Nasal secretions: - no discolored secretions noted - bilaterally  -     Nasal septum: RIGHT severe deviation   -     Inferior turbinate: - normal mucosa without significant hypertrophy - bilaterally  -     Middle turbinate: hypertrophy or edema (Severe) on the left  -     Other findings: - no mass or obstructive lesion -        Complications: No    Estimated Blood Loss: 200cc           Specimens (From admission, onward)    Start     Ordered    03/27/19 1323  Specimen to Pathology - Surgery  Once     Start Status     03/27/19 1323 Collected (03/27/19 1325) Order ID: [id number]       03/27/19 1324          Justification for the operation:     Erendira has a history of chronic sinusitis with polyposis and has been on maximal medical therapy without significant clinical improvement.    We discussed at length the risk of sinus surgery including, but not limited to, recurrence of disease, bleeding, infection, septal perforation, tooth or cheek numbness, vision changes, orbital  injury, CSF leak and changes in smell.  The patient had opportunity to ask questions and I answered all of them to their satisfaction.  We will proceed as planned.    Procedure in Detail:      Prior to starting the case, the eyes were protected with OpSites and the nose was decongested bilaterally with Oxymetazoline-soaked cottonoids.  We used an endoscope to analyse the nasal cavities.      We infiltrated 1% Lidocaine with 1:100,000 Epinephrine into the nasal septum, [facility] anterior & posterior & nasal turbinates on both sides.    During the case, we did send pathology tissue from sinonasal contents.      We then performed a septoplasty.  The septum was found to have a right dorsal/high deviation.  A 15 blade was used to make an incision in the right septal mucosa in the form of a Santosh incision (about 1cm from the caudal septal margin).  We elevated the mucoperichondrium away from the cartilage and bone.  The deviated septal cartilage and bone were removed.  Care was taken to leave at least 1 cm of caudal and dorsal cartilage intact for support.  The mucoperichondrial flap was repositioned in the midline.  Suture closure of the septal mucosa was not required / placed. Additional details/findings: none      We then performed a left sided nadira bullosa resection.  This was performed by removing the inferior and lateral portions of the middle turbinate and exteriorizing the air cells of the middle turbinate.    The same procedure was performed on the other side describing a nadira bullosa resection.    We then performed a left sided maxillary sinusotomy without tissue removal.  A limited reduction of the middle turbinate head was required for visualization and access.  The maxillary sinus was entered through the natural ostium with resection of uncinate tissue.  A wide maxillary antrostomy was performed.  The maxillary had marked edema with polypoid changes and - no discolored secretions noted -.  Additional  details/findings: none      The same procedure was performed on the other side describing a maxillary sinusotomy without tissue removal.  This maxillary had marked edema with polypoid changes and - no discolored secretions noted -.  Additional details/findings: none    We then performed a left sided Total ethmoidectomy.  The ethmoid sinuses were entered through the .  We used the microdebrider, curved suction, currette and non-thru cut forceps to remove ethmoid septations of the anterior and posterior ethmoid air cells.  Care was taken to not disrupt the lamina papyracea, fovea ethmoidalis, or middle/superior turbinate attachment to skull base during the complete dissection.  About <10% ethmoid mucosa was exenterated.  The ethmoids had rebecca polyps limited to within sinus(es) and - no discolored secretions noted -.  Additional details/findings: none    The same procedure was performed on the other side describing a   Total  ethmoidectomy.  This ethmoid had rebecca polyps limited to within sinus(es) and - no discolored secretions noted -.  Additional details/findings: none      We then performed a left sided sphenoid sinusotomy without tissue removal.  A limited reduction of the middle turbinate tail was required for visualization and access.  The sphenoid sinus was entered via the landmarks of the choanae and superior turbinate.  We did palpate the back wall of the sphenoid to confirm our location.  A wide sphenoid sinusotomy was performed.  The sphenoid had marked edema with polypoid changes and - no discolored secretions noted -.  Additional details/findings: none    The same procedure was performed on the other side describing a sphenoid sinusotomy without tissue removal.  This sphenoid had marked edema with polypoid changes and - no discolored secretions noted -.  Additional details/findings: none      We then performed a left sided frontal sinusotomy without tissue removal.  The frontal sinus was entered through  the landmarks of the middle turbinate and maxillary natural ostium.  A wide frontal sinusotomy was performed.  Specifically, we completed a Draf 2A.  The frontal had mild edema and - no discolored secretions noted -.  Additional details/findings: none    The same procedure was performed on the other side describing a frontal sinusotomy without tissue removal.  This frontal had normal mucosa and - no discolored secretions noted -.  Additional details/findings: none    The cavities were copiously irrigated and inspected for hemostasis.  Nasopore was placed bilaterally.  The patient was awoken and transferred to the recovery room in stable condition.             Disposition: PACU - hemodynamically stable.           Condition: Good    Attestation:  I performed the procedure.

## 2019-03-27 NOTE — ANESTHESIA RELEASE NOTE
"Anesthesia Release from PACU Note    Patient: Erendira Pretty    Procedure(s) Performed: Procedure(s) (LRB):  FESS (FUNCTIONAL ENDOSCOPIC SINUS SURGERY) (Bilateral)    Anesthesia type: general    Post pain: Adequate analgesia    Post assessment: no apparent anesthetic complications, tolerated procedure well and no evidence of recall    Last Vitals:   Visit Vitals  BP (!) 186/88   Pulse 70   Temp 36.7 °C (98.1 °F) (Skin)   Resp 20   Ht 5' 8" (1.727 m)   Wt 112.4 kg (247 lb 12.8 oz)   SpO2 97%   BMI 37.68 kg/m²       Post vital signs: stable    Level of consciousness: responds to stimulation    Nausea/Vomiting: no nausea/no vomiting    Complications: none    Airway Patency: patent    Respiratory: unassisted    Cardiovascular: stable and blood pressure at baseline    Hydration: euvolemic     "

## 2019-03-27 NOTE — PLAN OF CARE
Patient prepared for surgery. Side effects of anesthesia discussed with patient, verbalized understanding.

## 2019-03-27 NOTE — TRANSFER OF CARE
"Anesthesia Transfer of Care Note    Patient: Erendira Pretty    Procedure(s) Performed: Procedure(s) (LRB):  FESS (FUNCTIONAL ENDOSCOPIC SINUS SURGERY) (Bilateral)    Patient location: PACU    Anesthesia Type: general    Transport from OR: Transported from OR on room air with adequate spontaneous ventilation    Post pain: adequate analgesia    Post assessment: no apparent anesthetic complications    Post vital signs: stable    Level of consciousness: responds to stimulation    Nausea/Vomiting: no nausea/vomiting    Complications: none    Transfer of care protocol was followed      Last vitals:   Visit Vitals  BP (!) 186/88   Pulse 70   Temp 36.7 °C (98.1 °F) (Skin)   Resp 20   Ht 5' 8" (1.727 m)   Wt 112.4 kg (247 lb 12.8 oz)   SpO2 97%   BMI 37.68 kg/m²     "

## 2019-03-28 ENCOUNTER — TELEPHONE (OUTPATIENT)
Dept: CARDIOLOGY | Facility: CLINIC | Age: 74
End: 2019-03-28

## 2019-03-28 NOTE — TELEPHONE ENCOUNTER
03/28 Called and spoke with pt , he was on 2000 mg of niacin and it caused his blood vessels in the arm to break so at that time you dc'd it. What is the alternative or do you want him on the 1000 mg at this time even if we can not increase it later> Thanks Delmy   ----- Message from Frank Gallardo MD sent at 3/27/2019  6:50 AM CDT -----  Lipids reviwed, LDL at goal, Tg high , increase niacin to 1000 mg q day

## 2019-03-28 NOTE — ANESTHESIA PREPROCEDURE EVALUATION
03/28/2019  Erendira Pretty is a 73 y.o., male.    Anesthesia Evaluation    I have reviewed the Patient Summary Reports.    I have reviewed the Nursing Notes.      Review of Systems  Anesthesia Hx:  Denies Family Hx of Anesthesia complications.   Denies Personal Hx of Anesthesia complications.   Social:  Former Smoker    Cardiovascular:   Hypertension Valvular problems/Murmurs (Mild -mod AS on echo RADHA 1 cmsq, MG 19 mmhg), AS CAD   Angina PVD MARCUS NYHA Classification II ECG has been reviewed.    Pulmonary:   Shortness of breath Sleep Apnea, CPAP    Hepatic/GI:   Hepatitis (cured by [facility] in 2015), C    Musculoskeletal:   Arthritis     Neurological:  Neurology Normal    Endocrine:   Diabetes Hypothyroidism        Physical Exam  General:  Obesity    Airway/Jaw/Neck:  Airway Findings: Mouth Opening: Normal Mallampati: II      Dental:  DENTAL FINDINGS: Normal   Chest/Lungs:  Chest/Lungs Findings: Normal Respiratory Rate     Heart/Vascular:  Heart Findings: Normal            Anesthesia Plan  Type of Anesthesia, risks & benefits discussed:  Anesthesia Type:  general  Patient's Preference:   Intra-op Monitoring Plan: standard ASA monitors  Intra-op Monitoring Plan Comments:   Post Op Pain Control Plan: multimodal analgesia  Post Op Pain Control Plan Comments:   Induction:    Beta Blocker:  Patient is on a Beta-Blocker and has received one dose within the past 24 hours (No further documentation required).       Informed Consent: Patient understands risks and agrees with Anesthesia plan.  Questions answered. Anesthesia consent signed with patient.  ASA Score: 2     Day of Surgery Review of History & Physical: I have interviewed and examined the patient. I have reviewed the patient's H&P dated:  There are no significant changes.      Anesthesia Plan Notes: Pt was seen before surgery yesterday.  Anesthesia risks and  benefits discussed with him.  This is a late entry note but eval and exam happened on 3/27/19.        Ready For Surgery From Anesthesia Perspective.

## 2019-03-28 NOTE — ANESTHESIA POSTPROCEDURE EVALUATION
Anesthesia Post Evaluation    Patient: Erendira Pretty    Procedure(s) Performed: Procedure(s) (LRB):  FESS (FUNCTIONAL ENDOSCOPIC SINUS SURGERY) (Bilateral)    Final Anesthesia Type: general  Patient location during evaluation: PACU  Patient participation: Yes- Able to Participate  Level of consciousness: awake and alert  Post-procedure vital signs: reviewed and stable  Pain management: adequate  Airway patency: patent  PONV status at discharge: No PONV  Anesthetic complications: no      Cardiovascular status: blood pressure returned to baseline  Respiratory status: unassisted  Hydration status: euvolemic  Follow-up not needed.          Vitals Value Taken Time   /81 3/27/2019  2:31 PM   Temp 36.2 °C (97.1 °F) 3/27/2019  2:30 PM   Pulse 61 3/27/2019  2:34 PM   Resp 8 3/27/2019  2:34 PM   SpO2 97 % 3/27/2019  2:35 PM   Vitals shown include unvalidated device data.      Event Time     Out of Recovery 14:37:00          Pain/Natalie Score: Pain Rating Prior to Med Admin: 6 (3/27/2019  2:30 PM)  Natalie Score: 10 (3/27/2019  3:10 PM)

## 2019-03-29 ENCOUNTER — TELEPHONE (OUTPATIENT)
Dept: CARDIOLOGY | Facility: CLINIC | Age: 74
End: 2019-03-29

## 2019-03-29 NOTE — TELEPHONE ENCOUNTER
Called and left v/m , Dr Gallardo wants the inj for cholesterol to be done. Sending to [facility] and to expect a call from them. They may ask for personal as well as financial questions. Call me if needed. cm

## 2019-04-02 ENCOUNTER — TELEPHONE (OUTPATIENT)
Dept: PHARMACY | Facility: CLINIC | Age: 74
End: 2019-04-02

## 2019-04-02 VITALS
HEIGHT: 68 IN | SYSTOLIC BLOOD PRESSURE: 143 MMHG | OXYGEN SATURATION: 96 % | HEART RATE: 64 BPM | TEMPERATURE: 97 F | BODY MASS INDEX: 37.56 KG/M2 | RESPIRATION RATE: 17 BRPM | DIASTOLIC BLOOD PRESSURE: 81 MMHG | WEIGHT: 247.81 LBS

## 2019-04-03 ENCOUNTER — TELEPHONE (OUTPATIENT)
Dept: CARDIOLOGY | Facility: CLINIC | Age: 74
End: 2019-04-03

## 2019-04-03 DIAGNOSIS — E78.2 MIXED HYPERLIPIDEMIA: Primary | ICD-10-CM

## 2019-04-03 NOTE — TELEPHONE ENCOUNTER
04/03 Rtc and order and appt made for tomorrow for lipid panel in [facility]. Cm    ----- Message from Tracy Brooks sent at 4/3/2019 10:12 AM CDT -----  Contact: Agata/wife [phone]  States that he would like to have his triglyceride level checked again. Please call back at [phone]//thank you acc

## 2019-04-04 ENCOUNTER — LAB VISIT (OUTPATIENT)
Dept: LAB | Facility: HOSPITAL | Age: 74
End: 2019-04-04
Attending: INTERNAL MEDICINE
Payer: MEDICARE

## 2019-04-04 DIAGNOSIS — E78.2 MIXED HYPERLIPIDEMIA: ICD-10-CM

## 2019-04-04 LAB
CHOLEST SERPL-MCNC: 151 MG/DL (ref 120–199)
CHOLEST/HDLC SERPL: 3.9 {RATIO} (ref 2–5)
HDLC SERPL-MCNC: 39 MG/DL (ref 40–75)
HDLC SERPL: 25.8 % (ref 20–50)
LDLC SERPL CALC-MCNC: 90.6 MG/DL (ref 63–159)
NONHDLC SERPL-MCNC: 112 MG/DL
TRIGL SERPL-MCNC: 107 MG/DL (ref 30–150)

## 2019-04-04 PROCEDURE — 36415 COLL VENOUS BLD VENIPUNCTURE: CPT | Mod: PO

## 2019-04-04 PROCEDURE — 80061 LIPID PANEL: CPT

## 2019-04-05 ENCOUNTER — TELEPHONE (OUTPATIENT)
Dept: CARDIOLOGY | Facility: CLINIC | Age: 74
End: 2019-04-05

## 2019-04-05 NOTE — TELEPHONE ENCOUNTER
Pt notified . Cm  ----- Message from Frank Gallardo MD sent at 4/5/2019  8:59 AM CDT -----  Lipids are at goal

## 2019-04-09 ENCOUNTER — TELEPHONE (OUTPATIENT)
Dept: OTOLARYNGOLOGY | Facility: CLINIC | Age: 74
End: 2019-04-09

## 2019-04-09 ENCOUNTER — TELEPHONE (OUTPATIENT)
Dept: CARDIOLOGY | Facility: CLINIC | Age: 74
End: 2019-04-09

## 2019-04-09 ENCOUNTER — OFFICE VISIT (OUTPATIENT)
Dept: OTOLARYNGOLOGY | Facility: CLINIC | Age: 74
End: 2019-04-09
Payer: MEDICARE

## 2019-04-09 VITALS
SYSTOLIC BLOOD PRESSURE: 126 MMHG | HEART RATE: 84 BPM | DIASTOLIC BLOOD PRESSURE: 77 MMHG | TEMPERATURE: 98 F | HEIGHT: 68 IN | WEIGHT: 249.81 LBS | BODY MASS INDEX: 37.86 KG/M2

## 2019-04-09 DIAGNOSIS — J30.1 NON-SEASONAL ALLERGIC RHINITIS DUE TO POLLEN: ICD-10-CM

## 2019-04-09 DIAGNOSIS — J33.9 NASAL POLYPOSIS: ICD-10-CM

## 2019-04-09 DIAGNOSIS — J32.4 CHRONIC PANSINUSITIS: Primary | ICD-10-CM

## 2019-04-09 PROCEDURE — 99999 PR PBB SHADOW E&M-EST. PATIENT-LVL IV: CPT | Mod: PBBFAC,,, | Performed by: OTOLARYNGOLOGY

## 2019-04-09 PROCEDURE — 31237 NSL/SINS NDSC SURG BX POLYPC: CPT | Mod: 50,PBBFAC | Performed by: OTOLARYNGOLOGY

## 2019-04-09 PROCEDURE — 99999 PR PBB SHADOW E&M-EST. PATIENT-LVL IV: ICD-10-PCS | Mod: PBBFAC,,, | Performed by: OTOLARYNGOLOGY

## 2019-04-09 PROCEDURE — 99213 OFFICE O/P EST LOW 20 MIN: CPT | Mod: 25,S$PBB,, | Performed by: OTOLARYNGOLOGY

## 2019-04-09 PROCEDURE — 99213 PR OFFICE/OUTPT VISIT, EST, LEVL III, 20-29 MIN: ICD-10-PCS | Mod: 25,S$PBB,, | Performed by: OTOLARYNGOLOGY

## 2019-04-09 PROCEDURE — 31237 PR NASAL/SINUS ENDOSCOPY,BX/RMV POLYP/DEBRID: ICD-10-PCS | Mod: 50,79,S$GLB, | Performed by: OTOLARYNGOLOGY

## 2019-04-09 PROCEDURE — 31237 NSL/SINS NDSC SURG BX POLYPC: CPT | Mod: 50,79,S$GLB, | Performed by: OTOLARYNGOLOGY

## 2019-04-09 NOTE — TELEPHONE ENCOUNTER
Patient wife called asking if her  should use the  Praluent Pen since his triglycerides are stable?

## 2019-04-09 NOTE — PROGRESS NOTES
Subjective:   Patient: Erendira Pretty [id number], :1945   Visit date:2019 9:04 AM    Chief Complaint: Status post sinus surgery    HPI:  Erendira is a 73 y.o. male with Chronic sinusitis.    Subjective:  No significant pain.  No bleeding.  Breathing fairly well through the nose.      Surgery: 3/27/19    Sinuses operated/OR findings:   Full house    Diffuse polyps.  No purulence    Final path:   FINAL PATHOLOGIC DIAGNOSIS  Nasal contents:  Chronic sinusitis with focally increased eosinophils (up to 30 per high-power field).  Fragments of benign bone and cartilage.  [facility]  Diagnosed by: Jh Moreno M.D.  (Electronically Signed: 2019 09:29:10)    OR Culture: na          Review of Systems:  -     Allergic/Immunologic: is allergic to lipitor [atorvastatin]; iodinated contrast- oral and iv dye; omeprazole; and prilosec [omeprazole magnesium]..  -     Constitutional: Current temp: 97.6 °F (36.4 °C) (Tympanic)    His meds, allergies, medical, surgical, social & family histories were reviewed & updated:  -     He has a current medication list which includes the following prescription(s): accu-chek lux plus meter, accu-chek lxu plus test strp, acetaminophen, albuterol-ipratropium, alirocumab, azelastine, budesonide-formoterol 160-4.5 mcg, clopidogrel, docusate sodium, finasteride, fluad 7132-8522 (65 yr up)(pf), fluticasone, glucosamine/chondr leach a sod, humalog kwikpen insulin, insulin, lancets, levocetirizine, metoprolol succinate, milk thistle, montelukast, multivit,iron,mins/folic acid, niacin (inositol niacinate), omega-3 fatty acids/dha/epa, ondansetron, oxycodone-acetaminophen, pen needle, diabetic, polyethylene glycol, rabeprazole, synthroid, aspirin, and furosemide, and the following Facility-Administered Medications: lactated ringers.  -     He  has a past medical history of Aortic stenosis, Asthma, BPH (benign prostatic hyperplasia), CAD (coronary artery disease), Cirrhosis, Claudication  "(4/9/2014), Colon polyp, COPD (chronic obstructive pulmonary disease), ED (erectile dysfunction), Encounter for blood transfusion, Ex-smoker, Hearing loss NEC, Hepatitis C, Hyperlipidemia, Hypertension, Hypothyroid, DELONTE on CPAP, Osteoarthritis, PVD (peripheral vascular disease), and Type 2 diabetes mellitus.   -     He does not have any pertinent problems on file.   -     He  has a past surgical history that includes Knee surgery (Right); Trigger finger release (Left); Carpal tunnel release (Left); Elbow bursa surgery (Right, 2010); Rectal surgery (2012); Eye surgery; Cataract extraction w/ intraocular lens  implant, bilateral (2008); Cardiac catheterization; Colonoscopy (8/2013); Colonoscopy (N/A, 5/30/2016); Transurethral resection of prostate (TURP) without use of laser (N/A, 6/19/2018); Coronary Artery Bypass Graft (CABG) (N/A, 11/5/2018); Endoscopic harvest of vein (Left, 11/5/2018); Cardiac surgery; Catheterization of both left and right heart (N/A, 11/2/2018); Functional endoscopic sinus surgery (FESS) (Bilateral, 3/27/2019); Frontal sinus obliteration (Bilateral, 3/27/2019); Maxillary antrostomy (Bilateral, 3/27/2019); Ethmoidectomy (Bilateral, 3/27/2019); and Nasal septoplasty (N/A, 3/27/2019).  -     He  reports that he quit smoking about 46 years ago. He has a 2.00 pack-year smoking history. He quit smokeless tobacco use about 42 years ago. His smokeless tobacco use included chew. He reports that he drinks about 1.2 oz of alcohol per week. He reports that he does not use drugs.  -     His family history includes Heart disease in his brother, father, and mother.  -     He is allergic to lipitor [atorvastatin]; iodinated contrast- oral and iv dye; omeprazole; and prilosec [omeprazole magnesium].    Objective:   Physical Exam:  Vitals:  /77   Pulse 84   Temp 97.6 °F (36.4 °C) (Tympanic)   Ht 5' 8" (1.727 m)   Wt 113.3 kg (249 lb 12.5 oz)   BMI 37.98 kg/m²   General appearance:  Well developed, " well nourished    Eyes:  Extraocular motions intact, PERRL    Communication:  no hoarseness, no dysphonia    Ears:  Otoscopy of external auditory canals and tympanic membranes was normal, clinical speech reception thresholds grossly intact, no mass/lesion of auricle.  Nose:  No masses/lesions of external nose, nasal mucosa, septum, and turbinates were within normal limits.  Mouth:  No mass/lesion of lips, teeth, gums, hard/soft palate, tongue, tonsils, or oropharynx.    Cardiovascular:  No pedal edema; Radial Pulses +2     Neck & Lymphatics:  No cervical lymphadenopathy, no neck mass/crepitus/ asymmetry, trachea is midline, no thyroid enlargement/tenderness/mass.    Psych: Oriented x3,  Alert with normal mood and affect.     Respiration/Chest:  Symmetric expansion during respiration, normal respiratory effort.    Skin:  Warm and intact. No ulcerations of face, scalp, neck.      Assessment & Plan:   Chronic Rhinosinusitis s/p endoscopic sinus surgery.  Individualized endoscopic debridements following routine functional endoscopic sinus surgery (FESS) provide improved long-term outcomes and may prevent future, more extensive revision procedures. Although two to three debridements in the first 30 days is typical for the majority of patients, once a week may be an appropriate frequency for postoperative debridement  in select patients with difficult problems. However, the frequency and length of time for which debridement is medically necessary will vary from case to case and must be individualized, a conclusion, which multiple studies analyzing debridement outcomes have acknowledged.  While 2-3 debridements will likely suffice in the majority of cases, there are situations in which a patient may require more frequent, very long-term debridements. Clinically, these include, but are not limited to, persistent crusting within the surgical bed, adhesion formation noted upon examination, more extensive surgery (eg, complex  frontal sinusotomies, neoplasm  resections), underlying immunologic disorders, diffuse polyposis, revision FESS, mucociliary disorders, allergic fungal sinusitis, and postoperative complications (eg, visual loss, cerebrospinal fluid leak, nasal hemorrhage).     Erendira presents today with significant crusting, discharge, synechia formation or narrowing of sinus ostia.  Therefore, the decision for endoscopic sinus debridement was determined to be necessary.    Erendira will return to clinic in 4 weeks.    Changes to medical regimen: nebulized budesonide and azelastine.  PO xyzal and singulair              NASAL ENDOSCOPY WITH POLYPECTOMY &/OR DEBRIDEMENT  (cpt 28229)  Procedure: Risks, benefits, and alternatives of the procedure were discussed with the patient, and the patient consented to the nasal endoscopy with debridement.  The nasal cavity was sprayed with a topical decongestant and anesthetic . The endoscope was passed into each nostril and each nasal cavity was visualized.  On each side the nasal cavity, sinuses (if open), turbinates, and septum were examined with the findings described below.  Crusting and polypoid material was removed with suction and straight non thru cut forceps.   At the end of the examination, the scope was removed. The patient tolerated the procedure well with no complications.     Endoscopic Sinonasal Exam Findings:  -     Sinuses examined: bilateral maxillary, bilateral ethmoid anterior and posterior, bilateral sphenoid and bilateral frontal            The right sinus(es) have moderate edema            The left sinus(es) have moderate edema  -     Nasal secretions: dried crusts and dried blood bilaterally  -     Nasal septum: RIGHT mild deviation   -     Inferior turbinate: hypertrophy or edema (Mild) bilaterally  -     Middle turbinate: turbinate partially resected bilaterally  -     Other findings: - no mass or obstructive lesion -    Assessment & Plan:  See today's clinic note.

## 2019-04-09 NOTE — TELEPHONE ENCOUNTER
Prescription faxed to [facility] for Budesonide/astelin with 1 refill per Alejandro Parkinson.

## 2019-04-09 NOTE — TELEPHONE ENCOUNTER
His praluent has been approved but need to ask Dr Gallardo if he should start since the last lipid came back so good. I told the wife that the previous compared to the last one looks like he did not fast then when he requested a repeat it look like he did fast. I will ask Dr Gallardo and get back with them. They agreed. Cm    ----- Message from Nando Dejesus sent at 4/9/2019 12:37 PM CDT -----  Contact: pt wife - heaven   Type:  Needs Medical Advice    Who Called: pt   Symptoms (please be specific):    How long has patient had these symptoms:    Pharmacy name and phone #:    Would the patient rather a call back or a response via [facility]? Phone   Best Call Back Number: [phone]  Additional Information: is calling rg an approval for meds from the insurance co. There was a mix up on tests and wants to know if the Dr still wants the pt to take the med

## 2019-04-10 ENCOUNTER — TELEPHONE (OUTPATIENT)
Dept: CARDIOLOGY | Facility: CLINIC | Age: 74
End: 2019-04-10

## 2019-04-10 RX ORDER — MV/FA/DHA/EPA/FISH OIL/SAW/GNK 400MCG-200
COMBINATION PACKAGE (EA) ORAL
Status: ON HOLD | COMMUNITY
End: 2024-01-26 | Stop reason: HOSPADM

## 2019-04-10 NOTE — TELEPHONE ENCOUNTER
Called and left v/m to hold on praluent, do not cx rx, tell them will call if we start. Per  cont your niacin and fish oil. I see Omega 3. If on fish oil call me back. Thanks Delmy

## 2019-04-10 NOTE — TELEPHONE ENCOUNTER
Pt rtc and he is on omega 3-krill oil. Told him I would update his med list. Cm  Pt called and left message, not on niacin, due to breakage of blood vessels and bruising. Please call him back.   I rtc and left v/m , Sorry but the niacin was not removed from his med list. I have removed it now. I would like to know if the omega listed in med list is vegetarian or fish oil please let me know. Thanks Delmy

## 2019-04-17 ENCOUNTER — LAB VISIT (OUTPATIENT)
Dept: LAB | Facility: HOSPITAL | Age: 74
End: 2019-04-17
Attending: INTERNAL MEDICINE
Payer: MEDICARE

## 2019-04-17 ENCOUNTER — CLINICAL SUPPORT (OUTPATIENT)
Dept: CARDIOLOGY | Facility: CLINIC | Age: 74
End: 2019-04-17
Payer: MEDICARE

## 2019-04-17 ENCOUNTER — OFFICE VISIT (OUTPATIENT)
Dept: CARDIOLOGY | Facility: CLINIC | Age: 74
End: 2019-04-17
Payer: MEDICARE

## 2019-04-17 VITALS
SYSTOLIC BLOOD PRESSURE: 148 MMHG | HEIGHT: 68 IN | BODY MASS INDEX: 37.84 KG/M2 | HEART RATE: 78 BPM | WEIGHT: 249.69 LBS | DIASTOLIC BLOOD PRESSURE: 82 MMHG

## 2019-04-17 DIAGNOSIS — I49.3 PVC (PREMATURE VENTRICULAR CONTRACTION): ICD-10-CM

## 2019-04-17 DIAGNOSIS — I25.10 CORONARY ARTERY DISEASE INVOLVING NATIVE CORONARY ARTERY OF NATIVE HEART WITHOUT ANGINA PECTORIS: ICD-10-CM

## 2019-04-17 DIAGNOSIS — I10 ESSENTIAL HYPERTENSION: ICD-10-CM

## 2019-04-17 DIAGNOSIS — I73.9 PVD (PERIPHERAL VASCULAR DISEASE): ICD-10-CM

## 2019-04-17 DIAGNOSIS — Z95.1 S/P CABG X 2: ICD-10-CM

## 2019-04-17 DIAGNOSIS — G47.33 OSA ON CPAP: Chronic | ICD-10-CM

## 2019-04-17 DIAGNOSIS — I25.10 CORONARY ARTERY DISEASE INVOLVING NATIVE CORONARY ARTERY OF NATIVE HEART WITHOUT ANGINA PECTORIS: Primary | ICD-10-CM

## 2019-04-17 DIAGNOSIS — I10 ESSENTIAL HYPERTENSION: Primary | ICD-10-CM

## 2019-04-17 LAB
ANION GAP SERPL CALC-SCNC: 9 MMOL/L (ref 8–16)
BASOPHILS # BLD AUTO: 0.03 K/UL (ref 0–0.2)
BASOPHILS NFR BLD: 0.6 % (ref 0–1.9)
BNP SERPL-MCNC: 323 PG/ML (ref 0–99)
BUN SERPL-MCNC: 15 MG/DL (ref 8–23)
CALCIUM SERPL-MCNC: 8.9 MG/DL (ref 8.7–10.5)
CHLORIDE SERPL-SCNC: 102 MMOL/L (ref 95–110)
CO2 SERPL-SCNC: 27 MMOL/L (ref 23–29)
CREAT SERPL-MCNC: 1 MG/DL (ref 0.5–1.4)
DIFFERENTIAL METHOD: ABNORMAL
EOSINOPHIL # BLD AUTO: 0.1 K/UL (ref 0–0.5)
EOSINOPHIL NFR BLD: 2.3 % (ref 0–8)
ERYTHROCYTE [DISTWIDTH] IN BLOOD BY AUTOMATED COUNT: 18.8 % (ref 11.5–14.5)
EST. GFR  (AFRICAN AMERICAN): >60 ML/MIN/1.73 M^2
EST. GFR  (NON AFRICAN AMERICAN): >60 ML/MIN/1.73 M^2
GLUCOSE SERPL-MCNC: 162 MG/DL (ref 70–110)
HCT VFR BLD AUTO: 30.5 % (ref 40–54)
HGB BLD-MCNC: 9.2 G/DL (ref 14–18)
IMM GRANULOCYTES # BLD AUTO: 0.01 K/UL (ref 0–0.04)
IMM GRANULOCYTES NFR BLD AUTO: 0.2 % (ref 0–0.5)
LYMPHOCYTES # BLD AUTO: 1.5 K/UL (ref 1–4.8)
LYMPHOCYTES NFR BLD: 31.7 % (ref 18–48)
MCH RBC QN AUTO: 23.3 PG (ref 27–31)
MCHC RBC AUTO-ENTMCNC: 30.2 G/DL (ref 32–36)
MCV RBC AUTO: 77 FL (ref 82–98)
MONOCYTES # BLD AUTO: 0.6 K/UL (ref 0.3–1)
MONOCYTES NFR BLD: 12.5 % (ref 4–15)
NEUTROPHILS # BLD AUTO: 2.5 K/UL (ref 1.8–7.7)
NEUTROPHILS NFR BLD: 52.7 % (ref 38–73)
NRBC BLD-RTO: 0 /100 WBC
PLATELET # BLD AUTO: 201 K/UL (ref 150–350)
PMV BLD AUTO: 10.5 FL (ref 9.2–12.9)
POTASSIUM SERPL-SCNC: 4.1 MMOL/L (ref 3.5–5.1)
RBC # BLD AUTO: 3.95 M/UL (ref 4.6–6.2)
SODIUM SERPL-SCNC: 138 MMOL/L (ref 136–145)
WBC # BLD AUTO: 4.8 K/UL (ref 3.9–12.7)

## 2019-04-17 PROCEDURE — 93000 EKG 12-LEAD: ICD-10-PCS | Mod: S$GLB,,, | Performed by: INTERNAL MEDICINE

## 2019-04-17 PROCEDURE — 80048 BASIC METABOLIC PNL TOTAL CA: CPT

## 2019-04-17 PROCEDURE — 83880 ASSAY OF NATRIURETIC PEPTIDE: CPT

## 2019-04-17 PROCEDURE — 3079F PR MOST RECENT DIASTOLIC BLOOD PRESSURE 80-89 MM HG: ICD-10-PCS | Mod: CPTII,S$GLB,, | Performed by: INTERNAL MEDICINE

## 2019-04-17 PROCEDURE — 3079F DIAST BP 80-89 MM HG: CPT | Mod: CPTII,S$GLB,, | Performed by: INTERNAL MEDICINE

## 2019-04-17 PROCEDURE — 1101F PR PT FALLS ASSESS DOC 0-1 FALLS W/OUT INJ PAST YR: ICD-10-PCS | Mod: CPTII,S$GLB,, | Performed by: INTERNAL MEDICINE

## 2019-04-17 PROCEDURE — 99214 PR OFFICE/OUTPT VISIT, EST, LEVL IV, 30-39 MIN: ICD-10-PCS | Mod: S$GLB,,, | Performed by: INTERNAL MEDICINE

## 2019-04-17 PROCEDURE — 85025 COMPLETE CBC W/AUTO DIFF WBC: CPT

## 2019-04-17 PROCEDURE — 99214 OFFICE O/P EST MOD 30 MIN: CPT | Mod: S$GLB,,, | Performed by: INTERNAL MEDICINE

## 2019-04-17 PROCEDURE — 99999 PR PBB SHADOW E&M-EST. PATIENT-LVL II: CPT | Mod: PBBFAC,,, | Performed by: INTERNAL MEDICINE

## 2019-04-17 PROCEDURE — 3077F PR MOST RECENT SYSTOLIC BLOOD PRESSURE >= 140 MM HG: ICD-10-PCS | Mod: CPTII,S$GLB,, | Performed by: INTERNAL MEDICINE

## 2019-04-17 PROCEDURE — 36415 COLL VENOUS BLD VENIPUNCTURE: CPT | Mod: PO

## 2019-04-17 PROCEDURE — 3077F SYST BP >= 140 MM HG: CPT | Mod: CPTII,S$GLB,, | Performed by: INTERNAL MEDICINE

## 2019-04-17 PROCEDURE — 99999 PR PBB SHADOW E&M-EST. PATIENT-LVL II: ICD-10-PCS | Mod: PBBFAC,,, | Performed by: INTERNAL MEDICINE

## 2019-04-17 PROCEDURE — 1101F PT FALLS ASSESS-DOCD LE1/YR: CPT | Mod: CPTII,S$GLB,, | Performed by: INTERNAL MEDICINE

## 2019-04-17 PROCEDURE — 93000 ELECTROCARDIOGRAM COMPLETE: CPT | Mod: S$GLB,,, | Performed by: INTERNAL MEDICINE

## 2019-04-17 RX ORDER — FUROSEMIDE 40 MG/1
40 TABLET ORAL 2 TIMES DAILY
Qty: 60 TABLET | Refills: 11 | Status: SHIPPED | OUTPATIENT
Start: 2019-04-17 | End: 2019-04-29 | Stop reason: SDUPTHER

## 2019-04-17 NOTE — PROGRESS NOTES
Subjective:   Patient ID:  Erendira Pretty is a 73 y.o. male who presents for follow-up of Fatigue and Shortness of Breath  Pt with SOB/MARCUS on exertion.Pt denies CP.    Hypertension   This is a chronic problem. The current episode started more than 1 year ago. The problem has been gradually improving since onset. The problem is controlled. Associated symptoms include shortness of breath. Pertinent negatives include no chest pain or palpitations. Past treatments include diuretics and beta blockers. The current treatment provides moderate improvement. There are no compliance problems.    Coronary Artery Disease   Presents for follow-up visit. Symptoms include shortness of breath. Pertinent negatives include no chest pain, chest pressure, chest tightness, dizziness, leg swelling, muscle weakness, palpitations or weight gain. Risk factors include hyperlipidemia. The symptoms have been stable. Compliance with diet is variable. Compliance with exercise is variable. Compliance with medications is good.   Hyperlipidemia   This is a chronic problem. The current episode started more than 1 year ago. Recent lipid tests were reviewed and are variable. Factors aggravating his hyperlipidemia include beta blockers. Associated symptoms include shortness of breath. Pertinent negatives include no chest pain. Treatments tried: praluent. Compliance problems include medication side effects.        Review of Systems   Constitution: Negative. Negative for weight gain.   HENT: Negative.    Eyes: Negative.    Cardiovascular: Negative.  Negative for chest pain, leg swelling and palpitations.   Respiratory: Positive for shortness of breath. Negative for chest tightness.    Endocrine: Negative.    Hematologic/Lymphatic: Negative.    Skin: Negative.    Musculoskeletal: Negative for muscle weakness.   Gastrointestinal: Negative.    Genitourinary: Negative.    Neurological: Negative.  Negative for dizziness.   Psychiatric/Behavioral: Negative.     Allergic/Immunologic: Negative.      Family History   Problem Relation Age of Onset    Heart disease Mother     Heart disease Father     Heart disease Brother     Colon cancer Neg Hx      Past Medical History:   Diagnosis Date    Aortic stenosis     Asthma     BPH (benign prostatic hyperplasia)     CAD (coronary artery disease)     40% lesion ;lazo    Cirrhosis     Claudication 2014    Colon polyp     COPD (chronic obstructive pulmonary disease)     ED (erectile dysfunction)     Encounter for blood transfusion     Ex-smoker     Hearing loss NEC     Hepatitis C     Cured;reji brown     Hyperlipidemia     Hypertension     Hypothyroid     s/p tx graves    DELONTE on CPAP     Osteoarthritis     PVD (peripheral vascular disease)     Type 2 diabetes mellitus      Social History     Socioeconomic History    Marital status:      Spouse name: YAEL    Number of children: 2    Years of education: Not on file    Highest education level: Not on file   Occupational History    Not on file   Social Needs    Financial resource strain: Not on file    Food insecurity:     Worry: Not on file     Inability: Not on file    Transportation needs:     Medical: Not on file     Non-medical: Not on file   Tobacco Use    Smoking status: Former Smoker     Packs/day: 0.50     Years: 4.00     Pack years: 2.00     Last attempt to quit: 1972     Years since quittin.8    Smokeless tobacco: Former User     Types: Chew     Quit date: 1976   Substance and Sexual Activity    Alcohol use: Yes     Alcohol/week: 1.2 oz     Types: 2 Cans of beer per week     Comment: daily  No alcohol prior to surgery    Drug use: No    Sexual activity: Yes     Partners: Female   Lifestyle    Physical activity:     Days per week: Not on file     Minutes per session: Not on file    Stress: Not on file   Relationships    Social connections:     Talks on phone: Not on file     Gets together:  Not on file     Attends Hinduism service: Not on file     Active member of club or organization: Not on file     Attends meetings of clubs or organizations: Not on file     Relationship status: Not on file   Other Topics Concern    Not on file   Social History Narrative    . Lives with spouse. 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      Current Outpatient Medications on File Prior to Visit   Medication Sig Dispense Refill    ACCU-CHEK GRACE PLUS METER Misc AS DIRECTED  0    ACCU-CHEK GRACE PLUS TEST STRP Strp TEST THREE TIMES A  strip 11    acetaminophen (TYLENOL) 650 MG TbSR Take 1 tablet (650 mg total) by mouth every 8 (eight) hours.  0    albuterol-ipratropium (DUO-NEB) 2.5 mg-0.5 mg/3 mL nebulizer solution Take 3 mLs by nebulization 2 (two) times daily. Rescue 120 vial 5    alirocumab (PRALUENT PEN) 75 mg/mL PnIj Inject 1 mL (75 mg total) into the skin every 14 (fourteen) days. 4 Syringe 3    aspirin (ECOTRIN) 81 MG EC tablet Take 1 tablet (81 mg total) by mouth once daily.  0    azelastine (ASTELIN) 137 mcg (0.1 %) nasal spray 2 sprays (274 mcg total) by Nasal route 2 (two) times daily. 30 mL 6    budesonide-formoterol 160-4.5 mcg (SYMBICORT) 160-4.5 mcg/actuation HFAA INHALE 2 PUFFS INTO THE LUNGS TWO TIMES A DAY 10.2 g 11    clopidogrel (PLAVIX) 75 mg tablet Take 1 tablet (75 mg total) by mouth once daily. 30 tablet 11    docusate sodium (COLACE) 100 MG capsule Take 1 capsule (100 mg total) by mouth 2 (two) times daily. 60 capsule 0    finasteride (PROSCAR) 5 mg tablet Take 1 tablet (5 mg total) by mouth once daily. 30 tablet 11    FLUAD 3640-5692, 65 YR UP,,PF, 45 mcg (15 mcg x 3)/0.5 mL Syrg       fluticasone (FLONASE) 50 mcg/actuation nasal spray 2 sprays (100 mcg total) by Each Nare route once daily. 16 g 11    furosemide (LASIX) 40 MG tablet Take 1 tablet (40 mg total) by mouth once daily. And as needed for 14 days 40 tablet 11     "GLUCOSAMINE HCL/CHONDR ROSARIO A NA (OSTEO BI-FLEX ORAL) Take 1 tablet by mouth 2 (two) times daily.       HUMALOG KWIKPEN INSULIN 100 unit/mL pen INJECT 3-4 UNITS INTO THE SKIN THREE TIMES DAILY BEFORE MEALS. 3 mL 11    insulin glargine (LANTUS SOLOSTAR) 100 unit/mL (3 mL) InPn pen Inject 40 Units into the skin once daily. 5 Syringe 6    krill oil 500 mg Cap Take by mouth.      lancets (ACCU-CHEK FASTCLIX LANCET DRUM) Misc TEST TWO TIMES A  each 5    levocetirizine (XYZAL) 5 MG tablet Take 1 tablet (5 mg total) by mouth every evening. 30 tablet 11    metoprolol succinate (TOPROL-XL) 50 MG 24 hr tablet Take 1 tablet (50 mg total) by mouth 2 (two) times daily. 60 tablet 11    milk thistle 175 mg tablet Take 175 mg by mouth once daily.      montelukast (SINGULAIR) 10 mg tablet Take 1 tablet (10 mg total) by mouth once daily. 30 tablet 11    MV,CA,IRON,MIN/FA/PHYTOSTEROL (CENTRUM SPECIALIST HEART ORAL) Take 1 tablet by mouth once daily.       ondansetron (ZOFRAN) 4 MG tablet Take 1 tablet (4 mg total) by mouth every 8 (eight) hours as needed. 20 tablet 0    oxyCODONE-acetaminophen (PERCOCET) 5-325 mg per tablet Take 1 tablet by mouth every 4 (four) hours as needed for Pain. 45 tablet 0    pen needle, diabetic (BD INSULIN PEN NEEDLE UF MINI) 31 gauge x 3/16" Ndle USE AS DIRECTED 100 each 11    polyethylene glycol (GLYCOLAX) 17 gram/dose powder Take 17 g ( 1 capful) by mouth once daily. 527 g 0    RABEprazole (ACIPHEX) 20 mg tablet Take 20 mg by mouth once daily.      SYNTHROID 137 mcg Tab tablet TAKE 2 TABLETS BY MOUTH BEFORE BREAKFAST. 60 tablet 5     Current Facility-Administered Medications on File Prior to Visit   Medication Dose Route Frequency Provider Last Rate Last Dose    lactated ringers infusion   Intravenous Continuous Omaira Theodore MD         Review of patient's allergies indicates:   Allergen Reactions    Lipitor [atorvastatin] Other (See Comments)     Muscle aches and pains as " well as fatigue.     Niacin preparations Other (See Comments)     Causes broken blood vessels and bruises    Iodinated contrast- oral and iv dye Hives     Tachycardia    Omeprazole Hives and Itching    Prilosec [omeprazole magnesium] Hives and Itching       Objective:     Physical Exam   Constitutional: He is oriented to person, place, and time. He appears well-developed and well-nourished.   HENT:   Head: Normocephalic and atraumatic.   Eyes: Pupils are equal, round, and reactive to light. Conjunctivae are normal.   Neck: Normal range of motion. Neck supple.   Cardiovascular: Normal rate, regular rhythm, normal heart sounds and intact distal pulses.   Pulmonary/Chest: Effort normal and breath sounds normal.   Abdominal: Soft. Bowel sounds are normal.   Neurological: He is alert and oriented to person, place, and time.   Skin: Skin is warm and dry.   Psychiatric: He has a normal mood and affect.   Nursing note and vitals reviewed.      Assessment:     1. Essential hypertension    2. Coronary artery disease involving native coronary artery of native heart without angina pectoris    3. PVD (peripheral vascular disease)    4. PVC (premature ventricular contraction)    5. S/P CABG x 2    6. DELONTE on CPAP        Plan:     Essential hypertension    Coronary artery disease involving native coronary artery of native heart without angina pectoris    PVD (peripheral vascular disease)    PVC (premature ventricular contraction)    S/P CABG x 2    DELONTE on CPAP    Continue metoprolol, lasix-htn  Continue asa- cad  Cbc, bmp

## 2019-04-18 ENCOUNTER — TELEPHONE (OUTPATIENT)
Dept: CARDIOLOGY | Facility: CLINIC | Age: 74
End: 2019-04-18

## 2019-04-18 NOTE — TELEPHONE ENCOUNTER
Pt notified. Cm    ----- Message from Frank Gallardo MD sent at 4/17/2019 10:33 PM CDT -----  [facility] wnl

## 2019-04-22 ENCOUNTER — TELEPHONE (OUTPATIENT)
Dept: CARDIOLOGY | Facility: CLINIC | Age: 74
End: 2019-04-22

## 2019-04-22 NOTE — TELEPHONE ENCOUNTER
----- Message from Frank Gallardo MD sent at 4/20/2019  9:53 AM CDT -----  Start Fe for anemia, also consult hematology for anemia

## 2019-04-22 NOTE — TELEPHONE ENCOUNTER
I was notified by hematology appt was made. Cm    I was not able to make him an appt. I sent an [employer] message to Dr Westbrook to assist in making him an appt. Waiting on a reply. oKdi  Pt notified and agrees and I am working on an appt with hematologist and will call him back. Kodi

## 2019-04-23 ENCOUNTER — TELEPHONE (OUTPATIENT)
Dept: CARDIOLOGY | Facility: CLINIC | Age: 74
End: 2019-04-23

## 2019-04-23 NOTE — TELEPHONE ENCOUNTER
Pt called and did not know about the appt made with hemoc. I rev'd and her agreed on the appt. Cm    ----- Message from Henry Casiano sent at 4/23/2019 12:00 PM CDT -----  Contact: pt  Type:  Needs Medical Advice    Who Called: patient  Symptoms (please be specific): n/a  How long has patient had these symptoms:  n/a  Pharmacy name and phone #:  na  Would the patient rather a call back or a response via [facility]?  Call back  Best Call Back Number: [phone]  Additional Information:

## 2019-04-26 ENCOUNTER — CLINICAL SUPPORT (OUTPATIENT)
Dept: CARDIOLOGY | Facility: CLINIC | Age: 74
End: 2019-04-26
Attending: INTERNAL MEDICINE
Payer: MEDICARE

## 2019-04-26 DIAGNOSIS — I73.9 PVD (PERIPHERAL VASCULAR DISEASE): ICD-10-CM

## 2019-04-26 PROCEDURE — 93925 CAR US DOPPLER ARTERIAL LEGS BILATERAL: ICD-10-PCS | Mod: S$GLB,,, | Performed by: INTERNAL MEDICINE

## 2019-04-26 PROCEDURE — 93922 CAR US ANKLE BRACHIAL INDICES EXT LTD WO STR: ICD-10-PCS | Mod: S$GLB,,, | Performed by: INTERNAL MEDICINE

## 2019-04-26 PROCEDURE — 93922 UPR/L XTREMITY ART 2 LEVELS: CPT | Mod: S$GLB,,, | Performed by: INTERNAL MEDICINE

## 2019-04-26 PROCEDURE — 93925 LOWER EXTREMITY STUDY: CPT | Mod: S$GLB,,, | Performed by: INTERNAL MEDICINE

## 2019-04-28 LAB — VASCULAR ANKLE BRACHIAL INDEX (ABI) RIGHT: 1.26 (ref 0.9–1.2)

## 2019-04-29 ENCOUNTER — TELEPHONE (OUTPATIENT)
Dept: CARDIOLOGY | Facility: CLINIC | Age: 74
End: 2019-04-29

## 2019-04-29 RX ORDER — FUROSEMIDE 40 MG/1
40 TABLET ORAL 2 TIMES DAILY
Qty: 60 TABLET | Refills: 11 | Status: SHIPPED | OUTPATIENT
Start: 2019-04-29 | End: 2019-05-01 | Stop reason: DRUGHIGH

## 2019-04-29 NOTE — TELEPHONE ENCOUNTER
Pt notified. Follow up as schedule. Cm    ----- Message from Frank Gallardo MD sent at 4/29/2019  3:09 PM CDT -----  [facility] nml

## 2019-04-29 NOTE — TELEPHONE ENCOUNTER
----- Message from Becki Nixon sent at 4/29/2019 10:19 AM CDT -----  Contact: Agata  Type:  RX Refill Request    Who Called: Agata  Refill or New Rx:New RX  RX Name and Strength:lasix 40 mg  How is the patient currently taking it? (ex. 1XDay):2XDay  Is this a 30 day or 90 day RX:30  Preferred Pharmacy with phone number:.  [facility] - [place], [place] - [address] [facility] [address]  [address] [address] [address]  Phone: [phone] Fax: [phone]  Local or Mail Order:local  Ordering Provider:Dr Gallardo  Would the patient rather a call back or a response via [facility]? Call back  Best Call Back Number:[phone], you can leave a message.   Additional Information: the RX he had was 1Xday and Dr Gallardo wants him to take it 2XDay.    Thank you

## 2019-05-01 RX ORDER — FUROSEMIDE 40 MG/1
TABLET ORAL
COMMUNITY
End: 2019-05-01 | Stop reason: SDUPTHER

## 2019-05-02 ENCOUNTER — TELEPHONE (OUTPATIENT)
Dept: CARDIOLOGY | Facility: CLINIC | Age: 74
End: 2019-05-02

## 2019-05-02 NOTE — TELEPHONE ENCOUNTER
Notified rx done to pharmacy and they are filling now. Cm    ----- Message from Ken Morales sent at 5/2/2019  3:34 PM CDT -----  Contact: pt   Pt states lasics script did not go thru at pharmacy and needs to be resent       ..[phone]        ..  [facility] - [facility] [address] [address]  [address] [address] [address]  Phone: [phone] Fax: [phone]

## 2019-05-02 NOTE — TELEPHONE ENCOUNTER
Called and rev'd with pt, instructed to cont. Current meds and to exercise. Cm    ----- Message from Frank Gallardo MD sent at 5/1/2019  9:43 PM CDT -----  Vascular studies show nonobstructive ds, continue current meds and exercise

## 2019-05-03 RX ORDER — FUROSEMIDE 40 MG/1
TABLET ORAL
Qty: 90 TABLET | Refills: 5 | Status: SHIPPED | OUTPATIENT
Start: 2019-05-03 | End: 2020-03-24

## 2019-05-06 ENCOUNTER — INITIAL CONSULT (OUTPATIENT)
Dept: HEMATOLOGY/ONCOLOGY | Facility: CLINIC | Age: 74
End: 2019-05-06
Payer: MEDICARE

## 2019-05-06 ENCOUNTER — LAB VISIT (OUTPATIENT)
Dept: LAB | Facility: HOSPITAL | Age: 74
End: 2019-05-06
Attending: INTERNAL MEDICINE
Payer: MEDICARE

## 2019-05-06 VITALS
OXYGEN SATURATION: 96 % | HEIGHT: 68 IN | TEMPERATURE: 97 F | WEIGHT: 248.69 LBS | DIASTOLIC BLOOD PRESSURE: 67 MMHG | BODY MASS INDEX: 37.69 KG/M2 | HEART RATE: 78 BPM | SYSTOLIC BLOOD PRESSURE: 149 MMHG

## 2019-05-06 DIAGNOSIS — D50.9 MICROCYTIC ANEMIA: Primary | ICD-10-CM

## 2019-05-06 DIAGNOSIS — D50.9 MICROCYTIC ANEMIA: ICD-10-CM

## 2019-05-06 LAB
ALBUMIN SERPL BCP-MCNC: 3.6 G/DL (ref 3.5–5.2)
ALP SERPL-CCNC: 101 U/L (ref 55–135)
ALT SERPL W/O P-5'-P-CCNC: 23 U/L (ref 10–44)
ANION GAP SERPL CALC-SCNC: 10 MMOL/L (ref 8–16)
AST SERPL-CCNC: 26 U/L (ref 10–40)
BASOPHILS # BLD AUTO: 0.02 K/UL (ref 0–0.2)
BASOPHILS NFR BLD: 0.3 % (ref 0–1.9)
BILIRUB SERPL-MCNC: 0.4 MG/DL (ref 0.1–1)
BUN SERPL-MCNC: 18 MG/DL (ref 8–23)
CALCIUM SERPL-MCNC: 9.2 MG/DL (ref 8.7–10.5)
CHLORIDE SERPL-SCNC: 104 MMOL/L (ref 95–110)
CO2 SERPL-SCNC: 26 MMOL/L (ref 23–29)
CREAT SERPL-MCNC: 1.1 MG/DL (ref 0.5–1.4)
CRP SERPL-MCNC: 1.5 MG/L (ref 0–8.2)
DIFFERENTIAL METHOD: ABNORMAL
EOSINOPHIL # BLD AUTO: 0.1 K/UL (ref 0–0.5)
EOSINOPHIL NFR BLD: 1.2 % (ref 0–8)
ERYTHROCYTE [DISTWIDTH] IN BLOOD BY AUTOMATED COUNT: 19.5 % (ref 11.5–14.5)
EST. GFR  (AFRICAN AMERICAN): >60 ML/MIN/1.73 M^2
EST. GFR  (NON AFRICAN AMERICAN): >60 ML/MIN/1.73 M^2
FERRITIN SERPL-MCNC: 13 NG/ML (ref 20–300)
FOLATE SERPL-MCNC: 14.5 NG/ML (ref 4–24)
GLUCOSE SERPL-MCNC: 194 MG/DL (ref 70–110)
HCT VFR BLD AUTO: 32.3 % (ref 40–54)
HGB BLD-MCNC: 10 G/DL (ref 14–18)
IRON SERPL-MCNC: 39 UG/DL (ref 45–160)
LYMPHOCYTES # BLD AUTO: 1.9 K/UL (ref 1–4.8)
LYMPHOCYTES NFR BLD: 31 % (ref 18–48)
MCH RBC QN AUTO: 24.1 PG (ref 27–31)
MCHC RBC AUTO-ENTMCNC: 31 G/DL (ref 32–36)
MCV RBC AUTO: 78 FL (ref 82–98)
MONOCYTES # BLD AUTO: 0.6 K/UL (ref 0.3–1)
MONOCYTES NFR BLD: 9.6 % (ref 4–15)
NEUTROPHILS # BLD AUTO: 3.5 K/UL (ref 1.8–7.7)
NEUTROPHILS NFR BLD: 57.9 % (ref 38–73)
PLATELET # BLD AUTO: 179 K/UL (ref 150–350)
PMV BLD AUTO: 9.7 FL (ref 9.2–12.9)
POTASSIUM SERPL-SCNC: 3.8 MMOL/L (ref 3.5–5.1)
PROT SERPL-MCNC: 7.9 G/DL (ref 6–8.4)
RBC # BLD AUTO: 4.15 M/UL (ref 4.6–6.2)
SATURATED IRON: 8 % (ref 20–50)
SODIUM SERPL-SCNC: 140 MMOL/L (ref 136–145)
TOTAL IRON BINDING CAPACITY: 480 UG/DL (ref 250–450)
TRANSFERRIN SERPL-MCNC: 324 MG/DL (ref 200–375)
VIT B12 SERPL-MCNC: 437 PG/ML (ref 210–950)
WBC # BLD AUTO: 6.04 K/UL (ref 3.9–12.7)

## 2019-05-06 PROCEDURE — 99205 PR OFFICE/OUTPT VISIT, NEW, LEVL V, 60-74 MIN: ICD-10-PCS | Mod: S$GLB,,, | Performed by: INTERNAL MEDICINE

## 2019-05-06 PROCEDURE — 1101F PT FALLS ASSESS-DOCD LE1/YR: CPT | Mod: CPTII,S$GLB,, | Performed by: INTERNAL MEDICINE

## 2019-05-06 PROCEDURE — 3078F DIAST BP <80 MM HG: CPT | Mod: CPTII,S$GLB,, | Performed by: INTERNAL MEDICINE

## 2019-05-06 PROCEDURE — 1101F PR PT FALLS ASSESS DOC 0-1 FALLS W/OUT INJ PAST YR: ICD-10-PCS | Mod: CPTII,S$GLB,, | Performed by: INTERNAL MEDICINE

## 2019-05-06 PROCEDURE — 99205 OFFICE O/P NEW HI 60 MIN: CPT | Mod: S$GLB,,, | Performed by: INTERNAL MEDICINE

## 2019-05-06 PROCEDURE — 85025 COMPLETE CBC W/AUTO DIFF WBC: CPT

## 2019-05-06 PROCEDURE — 80053 COMPREHEN METABOLIC PANEL: CPT

## 2019-05-06 PROCEDURE — 82728 ASSAY OF FERRITIN: CPT

## 2019-05-06 PROCEDURE — 86140 C-REACTIVE PROTEIN: CPT

## 2019-05-06 PROCEDURE — 82746 ASSAY OF FOLIC ACID SERUM: CPT

## 2019-05-06 PROCEDURE — 99999 PR PBB SHADOW E&M-EST. PATIENT-LVL V: CPT | Mod: PBBFAC,,, | Performed by: INTERNAL MEDICINE

## 2019-05-06 PROCEDURE — 82607 VITAMIN B-12: CPT

## 2019-05-06 PROCEDURE — 99999 PR PBB SHADOW E&M-EST. PATIENT-LVL V: ICD-10-PCS | Mod: PBBFAC,,, | Performed by: INTERNAL MEDICINE

## 2019-05-06 PROCEDURE — 83540 ASSAY OF IRON: CPT

## 2019-05-06 PROCEDURE — 36415 COLL VENOUS BLD VENIPUNCTURE: CPT

## 2019-05-06 PROCEDURE — 3078F PR MOST RECENT DIASTOLIC BLOOD PRESSURE < 80 MM HG: ICD-10-PCS | Mod: CPTII,S$GLB,, | Performed by: INTERNAL MEDICINE

## 2019-05-06 PROCEDURE — 3077F PR MOST RECENT SYSTOLIC BLOOD PRESSURE >= 140 MM HG: ICD-10-PCS | Mod: CPTII,S$GLB,, | Performed by: INTERNAL MEDICINE

## 2019-05-06 PROCEDURE — 3077F SYST BP >= 140 MM HG: CPT | Mod: CPTII,S$GLB,, | Performed by: INTERNAL MEDICINE

## 2019-05-06 NOTE — LETTER
May 6, 2019      Frank Gallardo MD  [address]  [place] [address]            [facility]  [address] [facility] [address]  Phone: [phone]  Fax: [phone]          Patient: Erendira Pretty   MR Number: [id number]   YOB: 1945   Date of Visit: 5/6/2019       Dear Dr. Frank Gallardo:    Thank you for referring Erendira Pretty to me for evaluation. Attached you will find relevant portions of my assessment and plan of care.    If you have questions, please do not hesitate to call me. I look forward to following Erendira Pretty along with you.    Sincerely,    Ramón Westbrook MD    Enclosure  CC:  No Recipients    If you would like to receive this communication electronically, please contact [email or web address] or [phone] to request more information on [employer] access.    For providers and/or their staff who would like to refer a patient to [facility], please contact us through our one-stop-shop provider referral line, [facility] , at [phone].    If you feel you have received this communication in error or would no longer like to receive these types of communications, please e-mail [email or web address]

## 2019-05-06 NOTE — PROGRESS NOTES
Subjective:      Patient ID: Erendira Pretty is a 73 y.o. male.    Chief Complaint: Anemia    The patient is a 73-year-old  male who presents to the [facility] today in consultation to discuss further evaluation management recommendations for anemia.  The patient has been referred to me by Dr. Frank Gallardo with Cardiology.  I have reviewed all the patient's relevant clinical history available in the medical record and have utilized this in my evaluation and management recommendations today.  Today the patient reports that overall he feels well and has no specific complaints.  He denies any fevers, chills or night sweats.  He denies any loss of appetite or unintentional weight loss.  He denies any melena, hematochezia, hematemesis, hemoptysis or hematuria.  He denies any nausea, vomiting or abdominal pain. He denies any bowel or urinary complaints.  Of note the patient's recent clinical history significant for CABG in November 2018.    Review of Systems   Constitutional: Negative for activity change, appetite change, chills, diaphoresis, fatigue, fever and unexpected weight change.   HENT: Negative for congestion, dental problem, ear pain, mouth sores, nosebleeds, postnasal drip, sinus pressure, sore throat, tinnitus, trouble swallowing and voice change.    Eyes: Negative for photophobia, pain, discharge, redness, itching and visual disturbance.   Respiratory: Negative for cough, chest tightness, shortness of breath, wheezing and stridor.    Cardiovascular: Negative for chest pain, palpitations and leg swelling.   Gastrointestinal: Negative for abdominal distention, abdominal pain, anal bleeding, blood in stool, constipation, diarrhea, nausea and vomiting.   Endocrine: Negative for cold intolerance, heat intolerance, polydipsia, polyphagia and polyuria.   Genitourinary: Negative for decreased urine volume, difficulty urinating, dysuria, flank pain, frequency, hematuria and urgency.    Musculoskeletal: Negative for arthralgias, back pain, gait problem, joint swelling and myalgias.   Skin: Negative for pallor and rash.   Allergic/Immunologic: Negative for immunocompromised state.   Neurological: Negative for dizziness, syncope, weakness, light-headedness and headaches.   Hematological: Negative for adenopathy. Does not bruise/bleed easily.   Psychiatric/Behavioral: Negative for agitation and confusion. The patient is not nervous/anxious.        Medication List with Changes/Refills   Current Medications    ACCU-CHEK GRACE PLUS METER MISC    AS DIRECTED    ACCU-CHEK GRACE PLUS TEST STRP STRP    TEST THREE TIMES A DAY    ACETAMINOPHEN (TYLENOL) 650 MG TBSR    Take 1 tablet (650 mg total) by mouth every 8 (eight) hours.    ALBUTEROL-IPRATROPIUM (DUO-NEB) 2.5 MG-0.5 MG/3 ML NEBULIZER SOLUTION    Take 3 mLs by nebulization 2 (two) times daily. Rescue    ALIROCUMAB (PRALUENT PEN) 75 MG/ML PNIJ    Inject 1 mL (75 mg total) into the skin every 14 (fourteen) days.    ASPIRIN (ECOTRIN) 81 MG EC TABLET    Take 1 tablet (81 mg total) by mouth once daily.    AZELASTINE (ASTELIN) 137 MCG (0.1 %) NASAL SPRAY    2 sprays (274 mcg total) by Nasal route 2 (two) times daily.    BUDESONIDE-FORMOTEROL 160-4.5 MCG (SYMBICORT) 160-4.5 MCG/ACTUATION HFAA    INHALE 2 PUFFS INTO THE LUNGS TWO TIMES A DAY    CLOPIDOGREL (PLAVIX) 75 MG TABLET    Take 1 tablet (75 mg total) by mouth once daily.    DOCUSATE SODIUM (COLACE) 100 MG CAPSULE    Take 1 capsule (100 mg total) by mouth 2 (two) times daily.    FINASTERIDE (PROSCAR) 5 MG TABLET    Take 1 tablet (5 mg total) by mouth once daily.    FLUAD 1153-9763, 65 YR UP,,PF, 45 MCG (15 MCG X 3)/0.5 ML SYRG        FLUTICASONE (FLONASE) 50 MCG/ACTUATION NASAL SPRAY    2 sprays (100 mcg total) by Each Nare route once daily.    FUROSEMIDE (LASIX) 40 MG TABLET    Take 1 tablet (40 mg) by mouth 2 times a day and 1 extra if needed for 14 days.    GLUCOSAMINE HCL/CHONDR ROSARIO A NA (OSTEO  "BI-FLEX ORAL)    Take 1 tablet by mouth 2 (two) times daily.     HUMALOG KWIKPEN INSULIN 100 UNIT/ML PEN    INJECT 3-4 UNITS INTO THE SKIN THREE TIMES DAILY BEFORE MEALS.    INSULIN GLARGINE (LANTUS SOLOSTAR) 100 UNIT/ML (3 ML) INPN PEN    Inject 40 Units into the skin once daily.    KRILL  MG CAP    Take by mouth.    LANCETS (ACCU-CHEK FASTCLIX LANCET DRUM) MISC    TEST TWO TIMES A DAY    LEVOCETIRIZINE (XYZAL) 5 MG TABLET    Take 1 tablet (5 mg total) by mouth every evening.    METOPROLOL SUCCINATE (TOPROL-XL) 50 MG 24 HR TABLET    Take 1 tablet (50 mg total) by mouth 2 (two) times daily.    MILK THISTLE 175 MG TABLET    Take 175 mg by mouth once daily.    MONTELUKAST (SINGULAIR) 10 MG TABLET    Take 1 tablet (10 mg total) by mouth once daily.    MV,CA,IRON,MIN/FA/PHYTOSTEROL (CENTRUM SPECIALIST HEART ORAL)    Take 1 tablet by mouth once daily.     ONDANSETRON (ZOFRAN) 4 MG TABLET    Take 1 tablet (4 mg total) by mouth every 8 (eight) hours as needed.    OXYCODONE-ACETAMINOPHEN (PERCOCET) 5-325 MG PER TABLET    Take 1 tablet by mouth every 4 (four) hours as needed for Pain.    PEN NEEDLE, DIABETIC (BD INSULIN PEN NEEDLE UF MINI) 31 GAUGE X 3/16" NDLE    USE AS DIRECTED    POLYETHYLENE GLYCOL (GLYCOLAX) 17 GRAM/DOSE POWDER    Take 17 g ( 1 capful) by mouth once daily.    RABEPRAZOLE (ACIPHEX) 20 MG TABLET    Take 20 mg by mouth once daily.    SYNTHROID 137 MCG TAB TABLET    TAKE 2 TABLETS BY MOUTH BEFORE BREAKFAST.     Review of patient's allergies indicates:   Allergen Reactions    Lipitor [atorvastatin] Other (See Comments)     Muscle aches and pains as well as fatigue.     Niacin preparations Other (See Comments)     Causes broken blood vessels and bruises    Iodinated contrast- oral and iv dye Hives     Tachycardia    Omeprazole Hives and Itching    Prilosec [omeprazole magnesium] Hives and Itching       Lab: I have reviewed all of the patient's relevant lab work available in the medical record and " have utilized this in my evaluation and management recommendations today    Imaging: I have reviewed all of the patient's diagnostic/imaging results available in the medical record and have utilized this in my evaluation and management recommendations today.      Objective:     Vitals:    05/06/19 1350   BP: (!) 149/67   Pulse: 78   Temp: 97 °F (36.1 °C)       Physical Exam   Constitutional: He is oriented to person, place, and time. He appears well-developed and well-nourished. No distress.   HENT:   Head: Normocephalic and atraumatic.   Nose: Nose normal.   Mouth/Throat: Oropharynx is clear and moist. No oropharyngeal exudate.   Eyes: Pupils are equal, round, and reactive to light. EOM are normal. No scleral icterus.   Neck: Normal range of motion. Neck supple. No tracheal deviation present. No thyromegaly present.   Cardiovascular: Normal rate, regular rhythm, normal heart sounds and intact distal pulses.   No murmur heard.  Pulmonary/Chest: Effort normal and breath sounds normal. No stridor. No respiratory distress. He has no wheezes. He has no rales. He exhibits no tenderness.   Abdominal: Soft. Bowel sounds are normal. He exhibits no distension and no mass. There is no tenderness. There is no rebound and no guarding.   Musculoskeletal: Normal range of motion. He exhibits no edema or tenderness.   Lymphadenopathy:     He has no cervical adenopathy.   Neurological: He is alert and oriented to person, place, and time. Coordination normal.   Skin: Skin is warm. No rash noted. He is not diaphoretic. No erythema.   Psychiatric: He has a normal mood and affect. His behavior is normal. Judgment and thought content normal.   Nursing note and vitals reviewed.      Assessment:     1. Microcytic anemia        Plan:   1.  I had a detailed discussion with the patient today with regard to the various possible etiologies for his microcytic anemia.  I will check lab work today including iron studies.  We discussed that it is  very possible that he might have iron deficiency due to unreplaced losses associated with his CABG in November 2019.  2.  He knows to seek immediate attention for any signs/symptoms of GI/ bleeding.    Further evaluation/management/follow up recommendations will be determined after above.  He knows to call for any additional questions or new problems.  Microcytic anemia  -     CBC auto differential; Future; Expected date: 05/06/2019  -     CMP; Future; Expected date: 05/06/2019  -     Iron and TIBC; Future; Expected date: 05/06/2019  -     Ferritin; Future; Expected date: 05/06/2019  -     C-REACTIVE PROTEIN; Future; Expected date: 05/06/2019  -     Vitamin B12; Future; Expected date: 05/06/2019  -     Folate; Future; Expected date: 05/06/2019

## 2019-05-07 ENCOUNTER — OFFICE VISIT (OUTPATIENT)
Dept: OTOLARYNGOLOGY | Facility: CLINIC | Age: 74
End: 2019-05-07
Payer: MEDICARE

## 2019-05-07 VITALS
HEART RATE: 80 BPM | DIASTOLIC BLOOD PRESSURE: 88 MMHG | SYSTOLIC BLOOD PRESSURE: 138 MMHG | TEMPERATURE: 98 F | HEIGHT: 68 IN | BODY MASS INDEX: 37.49 KG/M2 | WEIGHT: 247.38 LBS

## 2019-05-07 DIAGNOSIS — D50.0 IRON DEFICIENCY ANEMIA DUE TO CHRONIC BLOOD LOSS: ICD-10-CM

## 2019-05-07 DIAGNOSIS — J33.9 NASAL POLYPOSIS: ICD-10-CM

## 2019-05-07 DIAGNOSIS — J32.4 CHRONIC PANSINUSITIS: Primary | ICD-10-CM

## 2019-05-07 PROCEDURE — 99999 PR PBB SHADOW E&M-EST. PATIENT-LVL V: ICD-10-PCS | Mod: PBBFAC,,, | Performed by: OTOLARYNGOLOGY

## 2019-05-07 PROCEDURE — 31231 NASAL ENDOSCOPY DX: CPT | Mod: 79,S$GLB,, | Performed by: OTOLARYNGOLOGY

## 2019-05-07 PROCEDURE — 3075F PR MOST RECENT SYSTOLIC BLOOD PRESS GE 130-139MM HG: ICD-10-PCS | Mod: CPTII,S$GLB,, | Performed by: OTOLARYNGOLOGY

## 2019-05-07 PROCEDURE — 1101F PT FALLS ASSESS-DOCD LE1/YR: CPT | Mod: CPTII,S$GLB,, | Performed by: OTOLARYNGOLOGY

## 2019-05-07 PROCEDURE — 99213 PR OFFICE/OUTPT VISIT, EST, LEVL III, 20-29 MIN: ICD-10-PCS | Mod: 25,24,S$GLB, | Performed by: OTOLARYNGOLOGY

## 2019-05-07 PROCEDURE — 3079F DIAST BP 80-89 MM HG: CPT | Mod: CPTII,S$GLB,, | Performed by: OTOLARYNGOLOGY

## 2019-05-07 PROCEDURE — 31231 PR NASAL ENDOSCOPY, DX: ICD-10-PCS | Mod: 79,S$GLB,, | Performed by: OTOLARYNGOLOGY

## 2019-05-07 PROCEDURE — 1101F PR PT FALLS ASSESS DOC 0-1 FALLS W/OUT INJ PAST YR: ICD-10-PCS | Mod: CPTII,S$GLB,, | Performed by: OTOLARYNGOLOGY

## 2019-05-07 PROCEDURE — 99999 PR PBB SHADOW E&M-EST. PATIENT-LVL V: CPT | Mod: PBBFAC,,, | Performed by: OTOLARYNGOLOGY

## 2019-05-07 PROCEDURE — 3075F SYST BP GE 130 - 139MM HG: CPT | Mod: CPTII,S$GLB,, | Performed by: OTOLARYNGOLOGY

## 2019-05-07 PROCEDURE — 99213 OFFICE O/P EST LOW 20 MIN: CPT | Mod: 25,24,S$GLB, | Performed by: OTOLARYNGOLOGY

## 2019-05-07 PROCEDURE — 3079F PR MOST RECENT DIASTOLIC BLOOD PRESSURE 80-89 MM HG: ICD-10-PCS | Mod: CPTII,S$GLB,, | Performed by: OTOLARYNGOLOGY

## 2019-05-07 RX ORDER — SODIUM CHLORIDE 0.9 % (FLUSH) 0.9 %
10 SYRINGE (ML) INJECTION
Status: CANCELLED | OUTPATIENT
Start: 2019-05-18

## 2019-05-07 RX ORDER — METHYLPREDNISOLONE SOD SUCC 125 MG
125 VIAL (EA) INJECTION
Status: CANCELLED
Start: 2019-05-18

## 2019-05-07 RX ORDER — SODIUM CHLORIDE 0.9 % (FLUSH) 0.9 %
10 SYRINGE (ML) INJECTION
Status: CANCELLED | OUTPATIENT
Start: 2019-05-10

## 2019-05-07 RX ORDER — METHYLPREDNISOLONE SOD SUCC 125 MG
125 VIAL (EA) INJECTION
Status: CANCELLED
Start: 2019-05-10

## 2019-05-07 RX ORDER — HEPARIN 100 UNIT/ML
500 SYRINGE INTRAVENOUS
Status: CANCELLED | OUTPATIENT
Start: 2019-05-10

## 2019-05-07 RX ORDER — HEPARIN 100 UNIT/ML
500 SYRINGE INTRAVENOUS
Status: CANCELLED | OUTPATIENT
Start: 2019-05-18

## 2019-05-07 NOTE — PROGRESS NOTES
Subjective:   Patient: Erendira Pretty [id number], :1945   Visit date:2019 9:04 AM    Chief Complaint: Status post sinus surgery    HPI:  Erendira is a 73 y.o. male with Chronic sinusitis.    Subjective:  No significant pain.  No bleeding.  Breathing well through the nose.  No congestion.  Happy.      medical regimen: nebulized budesonide and azelastine.  PO xyzal and singulair    Surgery: 3/27/19    Sinuses operated/OR findings:   Full house    Diffuse polyps.  No purulence    Final path:   FINAL PATHOLOGIC DIAGNOSIS  Nasal contents:  Chronic sinusitis with focally increased eosinophils (up to 30 per high-power field).  Fragments of benign bone and cartilage.  [facility]  Diagnosed by: Jh Moreno M.D.  (Electronically Signed: 2019 09:29:10)    OR Culture: na          Review of Systems:  -     Allergic/Immunologic: is allergic to lipitor [atorvastatin]; niacin preparations; iodinated contrast- oral and iv dye; omeprazole; and prilosec [omeprazole magnesium]..  -     Constitutional: Current temp:      His meds, allergies, medical, surgical, social & family histories were reviewed & updated:  -     He has a current medication list which includes the following prescription(s): accu-chek lux plus meter, accu-chek lux plus test strp, acetaminophen, albuterol-ipratropium, alirocumab, aspirin, azelastine, budesonide-formoterol 160-4.5 mcg, clopidogrel, docusate sodium, finasteride, fluad 3731-2706 (65 yr up)(pf), fluticasone propionate, furosemide, glucosamine/chondr leach a sod, humalog kwikpen insulin, insulin, krill oil, lancets, levocetirizine, metoprolol succinate, milk thistle, montelukast, multivit,iron,mins/folic acid, ondansetron, oxycodone-acetaminophen, pen needle, diabetic, polyethylene glycol, rabeprazole, and synthroid, and the following Facility-Administered Medications: lactated ringers.  -     He  has a past medical history of Aortic stenosis, Asthma, BPH (benign prostatic hyperplasia), CAD  (coronary artery disease), Cirrhosis, Claudication (4/9/2014), Colon polyp, COPD (chronic obstructive pulmonary disease), ED (erectile dysfunction), Encounter for blood transfusion, Ex-smoker, Hearing loss NEC, Hepatitis C, Hyperlipidemia, Hypertension, Hypothyroid, DELONTE on CPAP, Osteoarthritis, PVD (peripheral vascular disease), and Type 2 diabetes mellitus.   -     He does not have any pertinent problems on file.   -     He  has a past surgical history that includes Knee surgery (Right); Trigger finger release (Left); Carpal tunnel release (Left); Elbow bursa surgery (Right, 2010); Rectal surgery (2012); Eye surgery; Cataract extraction w/ intraocular lens  implant, bilateral (2008); Cardiac catheterization; Colonoscopy (8/2013); Colonoscopy (N/A, 5/30/2016); Transurethral resection of prostate (TURP) without use of laser (N/A, 6/19/2018); Coronary Artery Bypass Graft (CABG) (N/A, 11/5/2018); Endoscopic harvest of vein (Left, 11/5/2018); Cardiac surgery; Catheterization of both left and right heart (N/A, 11/2/2018); Functional endoscopic sinus surgery (FESS) (Bilateral, 3/27/2019); Frontal sinus obliteration (Bilateral, 3/27/2019); Maxillary antrostomy (Bilateral, 3/27/2019); Ethmoidectomy (Bilateral, 3/27/2019); and Nasal septoplasty (N/A, 3/27/2019).  -     He  reports that he quit smoking about 46 years ago. He has a 2.00 pack-year smoking history. He quit smokeless tobacco use about 42 years ago. His smokeless tobacco use included chew. He reports that he drinks about 1.2 oz of alcohol per week. He reports that he does not use drugs.  -     His family history includes Heart disease in his brother, father, and mother.  -     He is allergic to lipitor [atorvastatin]; niacin preparations; iodinated contrast- oral and iv dye; omeprazole; and prilosec [omeprazole magnesium].    Objective:   Physical Exam:  Vitals:  There were no vitals taken for this visit.  General appearance:  Well developed, well  nourished    Eyes:  Extraocular motions intact, PERRL    Communication:  no hoarseness, no dysphonia    Ears:  Otoscopy of external auditory canals and tympanic membranes was normal, clinical speech reception thresholds grossly intact, no mass/lesion of auricle.  Nose:  No masses/lesions of external nose, nasal mucosa, septum, and turbinates were within normal limits.  Mouth:  No mass/lesion of lips, teeth, gums, hard/soft palate, tongue, tonsils, or oropharynx.    Cardiovascular:  No pedal edema; Radial Pulses +2     Neck & Lymphatics:  No cervical lymphadenopathy, no neck mass/crepitus/ asymmetry, trachea is midline, no thyroid enlargement/tenderness/mass.    Psych: Oriented x3,  Alert with normal mood and affect.     Respiration/Chest:  Symmetric expansion during respiration, normal respiratory effort.    Skin:  Warm and intact. No ulcerations of face, scalp, neck.      Assessment & Plan:   Chronic Rhinosinusitis s/p endoscopic sinus surgery.  Individualized endoscopic debridements following routine functional endoscopic sinus surgery (FESS) provide improved long-term outcomes and may prevent future, more extensive revision procedures. Although two to three debridements in the first 30 days is typical for the majority of patients, once a week may be an appropriate frequency for postoperative debridement  in select patients with difficult problems. However, the frequency and length of time for which debridement is medically necessary will vary from case to case and must be individualized, a conclusion, which multiple studies analyzing debridement outcomes have acknowledged.  While 2-3 debridements will likely suffice in the majority of cases, there are situations in which a patient may require more frequent, very long-term debridements. Clinically, these include, but are not limited to, persistent crusting within the surgical bed, adhesion formation noted upon examination, more extensive surgery (eg, complex  frontal sinusotomies, neoplasm  resections), underlying immunologic disorders, diffuse polyposis, revision FESS, mucociliary disorders, allergic fungal sinusitis, and postoperative complications (eg, visual loss, cerebrospinal fluid leak, nasal hemorrhage).     Erendira presents today without significant crusting, discharge, synechia formation or narrowing of sinus ostia.  Therefore, the decision for endoscopic sinus debridement was not determined to be necessary.    Erendira will return to clinic in 12 weeks.    Changes to medical regimen: none              NASAL ENDOSCOPY  Procedure: Risks, benefits, and alternatives of the procedure were discussed with the patient, and the patient consented to the nasal endoscopy with debridement.  The nasal cavity was sprayed with a topical decongestant and anesthetic . The endoscope was passed into each nostril and each nasal cavity was visualized.  On each side the nasal cavity, sinuses (if open), turbinates, and septum were examined with the findings described below.  Crusting and polypoid material was removed with suction and straight non thru cut forceps.   At the end of the examination, the scope was removed. The patient tolerated the procedure well with no complications.     Endoscopic Sinonasal Exam Findings:  -     Sinuses examined: bilateral maxillary, bilateral ethmoid anterior and posterior, bilateral sphenoid and bilateral frontal            The right sinus(es) have normal mucosa            The left sinus(es) have normal mucosa  -     Nasal secretions: dried crusts and dried blood bilaterally  -     Nasal septum: RIGHT mild deviation   -     Inferior turbinate: normal bilaterally  -     Middle turbinate: turbinate partially resected bilaterally  -     Other findings: - no mass or obstructive lesion -    Assessment & Plan:  See today's clinic note.

## 2019-05-09 ENCOUNTER — OFFICE VISIT (OUTPATIENT)
Dept: CARDIOLOGY | Facility: CLINIC | Age: 74
End: 2019-05-09
Payer: MEDICARE

## 2019-05-09 VITALS
SYSTOLIC BLOOD PRESSURE: 138 MMHG | WEIGHT: 247.38 LBS | BODY MASS INDEX: 37.49 KG/M2 | HEIGHT: 68 IN | DIASTOLIC BLOOD PRESSURE: 68 MMHG | HEART RATE: 80 BPM

## 2019-05-09 DIAGNOSIS — I25.10 CORONARY ARTERY DISEASE INVOLVING NATIVE CORONARY ARTERY OF NATIVE HEART WITHOUT ANGINA PECTORIS: ICD-10-CM

## 2019-05-09 DIAGNOSIS — I73.9 PVD (PERIPHERAL VASCULAR DISEASE): ICD-10-CM

## 2019-05-09 DIAGNOSIS — I49.3 PVC (PREMATURE VENTRICULAR CONTRACTION): ICD-10-CM

## 2019-05-09 DIAGNOSIS — I10 ESSENTIAL HYPERTENSION: Primary | ICD-10-CM

## 2019-05-09 DIAGNOSIS — E78.2 MIXED HYPERLIPIDEMIA: ICD-10-CM

## 2019-05-09 DIAGNOSIS — Z95.1 S/P CABG X 2: ICD-10-CM

## 2019-05-09 DIAGNOSIS — G47.33 OSA ON CPAP: Chronic | ICD-10-CM

## 2019-05-09 PROCEDURE — 3075F SYST BP GE 130 - 139MM HG: CPT | Mod: CPTII,S$GLB,, | Performed by: INTERNAL MEDICINE

## 2019-05-09 PROCEDURE — 99214 OFFICE O/P EST MOD 30 MIN: CPT | Mod: S$GLB,,, | Performed by: INTERNAL MEDICINE

## 2019-05-09 PROCEDURE — 3075F PR MOST RECENT SYSTOLIC BLOOD PRESS GE 130-139MM HG: ICD-10-PCS | Mod: CPTII,S$GLB,, | Performed by: INTERNAL MEDICINE

## 2019-05-09 PROCEDURE — 99999 PR PBB SHADOW E&M-EST. PATIENT-LVL III: ICD-10-PCS | Mod: PBBFAC,,, | Performed by: INTERNAL MEDICINE

## 2019-05-09 PROCEDURE — 99214 PR OFFICE/OUTPT VISIT, EST, LEVL IV, 30-39 MIN: ICD-10-PCS | Mod: S$GLB,,, | Performed by: INTERNAL MEDICINE

## 2019-05-09 PROCEDURE — 3078F PR MOST RECENT DIASTOLIC BLOOD PRESSURE < 80 MM HG: ICD-10-PCS | Mod: CPTII,S$GLB,, | Performed by: INTERNAL MEDICINE

## 2019-05-09 PROCEDURE — 99999 PR PBB SHADOW E&M-EST. PATIENT-LVL III: CPT | Mod: PBBFAC,,, | Performed by: INTERNAL MEDICINE

## 2019-05-09 PROCEDURE — 1101F PT FALLS ASSESS-DOCD LE1/YR: CPT | Mod: CPTII,S$GLB,, | Performed by: INTERNAL MEDICINE

## 2019-05-09 PROCEDURE — 3078F DIAST BP <80 MM HG: CPT | Mod: CPTII,S$GLB,, | Performed by: INTERNAL MEDICINE

## 2019-05-09 PROCEDURE — 1101F PR PT FALLS ASSESS DOC 0-1 FALLS W/OUT INJ PAST YR: ICD-10-PCS | Mod: CPTII,S$GLB,, | Performed by: INTERNAL MEDICINE

## 2019-05-09 NOTE — PROGRESS NOTES
Subjective:   Patient ID:  Erendira Pretty is a 73 y.o. male who presents for follow-up of Follow-up and Hypertension  Pt did see Hematology and anemia w/u in progress. Fe started on last clinic visit.    Hypertension   This is a chronic problem. The current episode started more than 1 year ago. The problem has been gradually improving since onset. The problem is controlled. Associated symptoms include shortness of breath. Pertinent negatives include no chest pain or palpitations. Past treatments include beta blockers and diuretics. The current treatment provides moderate improvement. There are no compliance problems.    Coronary Artery Disease   Presents for follow-up visit. Symptoms include shortness of breath. Pertinent negatives include no chest pain, chest pressure, chest tightness, dizziness, leg swelling, muscle weakness, palpitations or weight gain. Risk factors include hyperlipidemia. The symptoms have been stable. Compliance with diet is variable. Compliance with exercise is variable. Compliance with medications is good.   Hyperlipidemia   This is a chronic problem. The current episode started more than 1 year ago. Recent lipid tests were reviewed and are variable. Associated symptoms include shortness of breath. Pertinent negatives include no chest pain. He is currently on no antihyperlipidemic treatment. The current treatment provides mild improvement of lipids. Compliance problems include medication side effects.        Review of Systems   Constitution: Negative. Negative for weight gain.   HENT: Negative.    Eyes: Negative.    Cardiovascular: Negative.  Negative for chest pain, leg swelling and palpitations.   Respiratory: Positive for shortness of breath. Negative for chest tightness.    Endocrine: Negative.    Hematologic/Lymphatic: Negative.    Skin: Negative.    Musculoskeletal: Negative for muscle weakness.   Gastrointestinal: Negative.    Genitourinary: Negative.    Neurological: Negative.   Negative for dizziness.   Psychiatric/Behavioral: Negative.    Allergic/Immunologic: Negative.      Family History   Problem Relation Age of Onset    Heart disease Mother     Heart disease Father     Heart disease Brother     Colon cancer Neg Hx      Past Medical History:   Diagnosis Date    Aortic stenosis     Asthma     BPH (benign prostatic hyperplasia)     CAD (coronary artery disease)     40% lesion ;lazo    Cirrhosis     Claudication 2014    Colon polyp     COPD (chronic obstructive pulmonary disease)     ED (erectile dysfunction)     Encounter for blood transfusion     Ex-smoker     Hearing loss NEC     Hepatitis C     Cured;reji brown     Hyperlipidemia     Hypertension     Hypothyroid     s/p tx graves    DELONTE on CPAP     Osteoarthritis     PVD (peripheral vascular disease)     Type 2 diabetes mellitus      Social History     Socioeconomic History    Marital status:      Spouse name: YAEL    Number of children: 2    Years of education: Not on file    Highest education level: Not on file   Occupational History    Not on file   Social Needs    Financial resource strain: Not on file    Food insecurity:     Worry: Not on file     Inability: Not on file    Transportation needs:     Medical: Not on file     Non-medical: Not on file   Tobacco Use    Smoking status: Former Smoker     Packs/day: 0.50     Years: 4.00     Pack years: 2.00     Last attempt to quit: 1972     Years since quittin.9    Smokeless tobacco: Former User     Types: Chew     Quit date: 1976   Substance and Sexual Activity    Alcohol use: Yes     Alcohol/week: 1.2 oz     Types: 2 Cans of beer per week     Comment: daily  No alcohol prior to surgery    Drug use: No    Sexual activity: Yes     Partners: Female   Lifestyle    Physical activity:     Days per week: Not on file     Minutes per session: Not on file    Stress: Not on file   Relationships    Social  connections:     Talks on phone: Not on file     Gets together: Not on file     Attends Buddhist service: Not on file     Active member of club or organization: Not on file     Attends meetings of clubs or organizations: Not on file     Relationship status: Not on file   Other Topics Concern    Not on file   Social History Narrative    . Lives with spouse. 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      Current Outpatient Medications on File Prior to Visit   Medication Sig Dispense Refill    ACCU-CHEK GRACE PLUS METER Misc AS DIRECTED  0    ACCU-CHEK GRACE PLUS TEST STRP Strp TEST THREE TIMES A  strip 11    albuterol-ipratropium (DUO-NEB) 2.5 mg-0.5 mg/3 mL nebulizer solution Take 3 mLs by nebulization 2 (two) times daily. Rescue 120 vial 5    aspirin (ECOTRIN) 81 MG EC tablet Take 1 tablet (81 mg total) by mouth once daily.  0    azelastine (ASTELIN) 137 mcg (0.1 %) nasal spray 2 sprays (274 mcg total) by Nasal route 2 (two) times daily. 30 mL 6    budesonide-formoterol 160-4.5 mcg (SYMBICORT) 160-4.5 mcg/actuation HFAA INHALE 2 PUFFS INTO THE LUNGS TWO TIMES A DAY 10.2 g 11    docusate sodium (COLACE) 100 MG capsule Take 1 capsule (100 mg total) by mouth 2 (two) times daily. 60 capsule 0    finasteride (PROSCAR) 5 mg tablet Take 1 tablet (5 mg total) by mouth once daily. 30 tablet 11    furosemide (LASIX) 40 MG tablet Take 1 tablet (40 mg) by mouth 2 times a day and 1 extra if needed for 14 days. 90 tablet 5    GLUCOSAMINE HCL/CHONDR ROSARIO A NA (OSTEO BI-FLEX ORAL) Take 1 tablet by mouth 2 (two) times daily.       HUMALOG KWIKPEN INSULIN 100 unit/mL pen INJECT 3-4 UNITS INTO THE SKIN THREE TIMES DAILY BEFORE MEALS. 3 mL 11    insulin glargine (LANTUS SOLOSTAR) 100 unit/mL (3 mL) InPn pen Inject 40 Units into the skin once daily. 5 Syringe 6    krill oil 500 mg Cap Take by mouth.      lancets (ACCU-CHEK FASTCLIX LANCET DRUM) Misc TEST TWO TIMES A   "each 5    levocetirizine (XYZAL) 5 MG tablet Take 1 tablet (5 mg total) by mouth every evening. 30 tablet 11    metoprolol succinate (TOPROL-XL) 50 MG 24 hr tablet Take 1 tablet (50 mg total) by mouth 2 (two) times daily. 60 tablet 11    milk thistle 175 mg tablet Take 175 mg by mouth once daily.      montelukast (SINGULAIR) 10 mg tablet Take 1 tablet (10 mg total) by mouth once daily. 30 tablet 11    MV,CA,IRON,MIN/FA/PHYTOSTEROL (CENTRUM SPECIALIST HEART ORAL) Take 1 tablet by mouth once daily.       pen needle, diabetic (BD INSULIN PEN NEEDLE UF MINI) 31 gauge x 3/16" Ndle USE AS DIRECTED 100 each 11    polyethylene glycol (GLYCOLAX) 17 gram/dose powder Take 17 g ( 1 capful) by mouth once daily. 527 g 0    RABEprazole (ACIPHEX) 20 mg tablet Take 20 mg by mouth once daily.      SYNTHROID 137 mcg Tab tablet TAKE 2 TABLETS BY MOUTH BEFORE BREAKFAST. 60 tablet 5    [DISCONTINUED] acetaminophen (TYLENOL) 650 MG TbSR Take 1 tablet (650 mg total) by mouth every 8 (eight) hours.  0    [DISCONTINUED] alirocumab (PRALUENT PEN) 75 mg/mL PnIj Inject 1 mL (75 mg total) into the skin every 14 (fourteen) days. 4 Syringe 3    [DISCONTINUED] clopidogrel (PLAVIX) 75 mg tablet Take 1 tablet (75 mg total) by mouth once daily. 30 tablet 11    [DISCONTINUED] FLUAD 8354-1716, 65 YR UP,,PF, 45 mcg (15 mcg x 3)/0.5 mL Syrg       [DISCONTINUED] fluticasone (FLONASE) 50 mcg/actuation nasal spray 2 sprays (100 mcg total) by Each Nare route once daily. 16 g 11    [DISCONTINUED] ondansetron (ZOFRAN) 4 MG tablet Take 1 tablet (4 mg total) by mouth every 8 (eight) hours as needed. 20 tablet 0    [DISCONTINUED] oxyCODONE-acetaminophen (PERCOCET) 5-325 mg per tablet Take 1 tablet by mouth every 4 (four) hours as needed for Pain. 45 tablet 0     Current Facility-Administered Medications on File Prior to Visit   Medication Dose Route Frequency Provider Last Rate Last Dose    lactated ringers infusion   Intravenous Continuous " Omaira Theodore MD         Review of patient's allergies indicates:   Allergen Reactions    Lipitor [atorvastatin] Other (See Comments)     Muscle aches and pains as well as fatigue.     Niacin preparations Other (See Comments)     Causes broken blood vessels and bruises    Iodinated contrast- oral and iv dye Hives     Tachycardia    Omeprazole Hives and Itching    Prilosec [omeprazole magnesium] Hives and Itching       Objective:     Physical Exam   Constitutional: He is oriented to person, place, and time. He appears well-developed and well-nourished.   HENT:   Head: Normocephalic and atraumatic.   Eyes: Pupils are equal, round, and reactive to light. Conjunctivae are normal.   Neck: Normal range of motion. Neck supple.   Cardiovascular: Normal rate, regular rhythm, normal heart sounds and intact distal pulses.   Pulmonary/Chest: Effort normal and breath sounds normal.   Abdominal: Soft. Bowel sounds are normal.   Neurological: He is alert and oriented to person, place, and time.   Skin: Skin is warm and dry.   Psychiatric: He has a normal mood and affect.   Nursing note and vitals reviewed.      Assessment:     1. Essential hypertension    2. PVC (premature ventricular contraction)    3. Coronary artery disease involving native coronary artery of native heart without angina pectoris    4. PVD (peripheral vascular disease)    5. S/P CABG x 2    6. DELONTE on CPAP    7. Mixed hyperlipidemia        Plan:     Essential hypertension    PVC (premature ventricular contraction)    Coronary artery disease involving native coronary artery of native heart without angina pectoris    PVD (peripheral vascular disease)    S/P CABG x 2    DELONTE on CPAP    Mixed hyperlipidemia    Continue metoprolol, lasix-htn  Continue asa- cad  Continue Fe  Continue f/u hematology  Check lipids

## 2019-05-09 NOTE — PROGRESS NOTES
Subjective:      Patient ID: Erendira Pretty is a 73 y.o. male.    Chief Complaint: Anemia (hx of heart surgery/bipass/Nov 2018)    HPI  The patient has a history of liver cirrhosis, etiology unknown. He was last seen by Dr. Tomas in May 2018. The patient is new to me. He has been referred by Hematology for iron deficiency anemia. This started in November around the time he had cardiac bypass. His Hgb has remained relatively stable since then. He is due for his EGD and colonoscopy. See prior results documented below. The patient denies hematochezia, melena, change in bowel habits, weight loss, change in appetite, abdominal pain, nausea, vomiting, heartburn or dysphagia.   As for his liver cirrhosis, he needs his HCC screen updated. He denies ascites and confusion.      EGD (06/2017): grade I esophageal varices, gastritis, PHG, possible Galaviz's but no bx due to varices. Repeat 2 years.   Colonoscopy (05/2016): polyps, hemorrhoids. Repeat 3 years.   US (04/2018): cirrhosis stable.      Review of Systems  As per HPI.     Objective:     Physical Exam   Constitutional: He is oriented to person, place, and time. He appears well-developed and well-nourished. No distress.   HENT:   Head: Normocephalic and atraumatic.   Eyes: EOM are normal.   Cardiovascular: Normal rate and regular rhythm.   Murmur heard.  Pulmonary/Chest: Effort normal and breath sounds normal. No respiratory distress. He has no wheezes.   Abdominal: Soft. Bowel sounds are normal. He exhibits no distension. There is no tenderness.   Neurological: He is alert and oriented to person, place, and time. No cranial nerve deficit.   Skin: He is not diaphoretic.   Psychiatric: His behavior is normal.       Assessment:     1. Hepatic cirrhosis, unspecified hepatic cirrhosis type, unspecified whether ascites present    2. Portal hypertension with esophageal varices    3. History of colon polyps    4. Gastroesophageal reflux disease with esophagitis        Plan:      1. EGD and Colonoscopy.   The risks, benefits, alternatives and possible complications of the above procedure(s) were discussed with the patient to include but not limited to infection, bleeding, heart complications, respiratory complications, or perforation which may require surgical intervention. The patient's questions were answered. The patient verbally reported understanding of the discussion.  2. HCC screening.   3. Follow up in Hepatology clinic in six months.   4. Follow up with Hematology as previously scheduled.     Orders Placed This Encounter   Procedures    US Abdomen Limited    AFP tumor marker       Follow up in about 6 months (around 11/10/2019).    Thank you for the opportunity to participate in the care of this patient.   Phi Cordero PA-C.

## 2019-05-10 ENCOUNTER — TELEPHONE (OUTPATIENT)
Dept: HEMATOLOGY/ONCOLOGY | Facility: CLINIC | Age: 74
End: 2019-05-10

## 2019-05-10 ENCOUNTER — OFFICE VISIT (OUTPATIENT)
Dept: GASTROENTEROLOGY | Facility: CLINIC | Age: 74
End: 2019-05-10
Payer: MEDICARE

## 2019-05-10 ENCOUNTER — LAB VISIT (OUTPATIENT)
Dept: LAB | Facility: HOSPITAL | Age: 74
End: 2019-05-10
Attending: INTERNAL MEDICINE
Payer: MEDICARE

## 2019-05-10 VITALS
WEIGHT: 249.81 LBS | HEIGHT: 68 IN | BODY MASS INDEX: 37.86 KG/M2 | SYSTOLIC BLOOD PRESSURE: 130 MMHG | HEART RATE: 86 BPM | DIASTOLIC BLOOD PRESSURE: 68 MMHG

## 2019-05-10 DIAGNOSIS — I85.00 PORTAL HYPERTENSION WITH ESOPHAGEAL VARICES: ICD-10-CM

## 2019-05-10 DIAGNOSIS — K21.00 GASTROESOPHAGEAL REFLUX DISEASE WITH ESOPHAGITIS: ICD-10-CM

## 2019-05-10 DIAGNOSIS — K74.60 HEPATIC CIRRHOSIS, UNSPECIFIED HEPATIC CIRRHOSIS TYPE, UNSPECIFIED WHETHER ASCITES PRESENT: Primary | ICD-10-CM

## 2019-05-10 DIAGNOSIS — K76.6 PORTAL HYPERTENSION WITH ESOPHAGEAL VARICES: ICD-10-CM

## 2019-05-10 DIAGNOSIS — Z86.010 HISTORY OF COLON POLYPS: ICD-10-CM

## 2019-05-10 DIAGNOSIS — K74.60 HEPATIC CIRRHOSIS, UNSPECIFIED HEPATIC CIRRHOSIS TYPE, UNSPECIFIED WHETHER ASCITES PRESENT: ICD-10-CM

## 2019-05-10 LAB — AFP SERPL-MCNC: 4.5 NG/ML (ref 0–8.4)

## 2019-05-10 PROCEDURE — 1101F PT FALLS ASSESS-DOCD LE1/YR: CPT | Mod: CPTII,S$GLB,, | Performed by: PHYSICIAN ASSISTANT

## 2019-05-10 PROCEDURE — 3075F PR MOST RECENT SYSTOLIC BLOOD PRESS GE 130-139MM HG: ICD-10-PCS | Mod: CPTII,S$GLB,, | Performed by: PHYSICIAN ASSISTANT

## 2019-05-10 PROCEDURE — 3078F PR MOST RECENT DIASTOLIC BLOOD PRESSURE < 80 MM HG: ICD-10-PCS | Mod: CPTII,S$GLB,, | Performed by: PHYSICIAN ASSISTANT

## 2019-05-10 PROCEDURE — 1101F PR PT FALLS ASSESS DOC 0-1 FALLS W/OUT INJ PAST YR: ICD-10-PCS | Mod: CPTII,S$GLB,, | Performed by: PHYSICIAN ASSISTANT

## 2019-05-10 PROCEDURE — 3075F SYST BP GE 130 - 139MM HG: CPT | Mod: CPTII,S$GLB,, | Performed by: PHYSICIAN ASSISTANT

## 2019-05-10 PROCEDURE — 3078F DIAST BP <80 MM HG: CPT | Mod: CPTII,S$GLB,, | Performed by: PHYSICIAN ASSISTANT

## 2019-05-10 PROCEDURE — 82105 ALPHA-FETOPROTEIN SERUM: CPT

## 2019-05-10 PROCEDURE — 36415 COLL VENOUS BLD VENIPUNCTURE: CPT

## 2019-05-10 PROCEDURE — 99999 PR PBB SHADOW E&M-EST. PATIENT-LVL V: ICD-10-PCS | Mod: PBBFAC,,, | Performed by: PHYSICIAN ASSISTANT

## 2019-05-10 PROCEDURE — 99999 PR PBB SHADOW E&M-EST. PATIENT-LVL V: CPT | Mod: PBBFAC,,, | Performed by: PHYSICIAN ASSISTANT

## 2019-05-10 PROCEDURE — 99214 OFFICE O/P EST MOD 30 MIN: CPT | Mod: S$GLB,,, | Performed by: PHYSICIAN ASSISTANT

## 2019-05-10 PROCEDURE — 99214 PR OFFICE/OUTPT VISIT, EST, LEVL IV, 30-39 MIN: ICD-10-PCS | Mod: S$GLB,,, | Performed by: PHYSICIAN ASSISTANT

## 2019-05-10 RX ORDER — SODIUM, POTASSIUM,MAG SULFATES 17.5-3.13G
SOLUTION, RECONSTITUTED, ORAL ORAL
Qty: 354 ML | Refills: 0 | Status: ON HOLD | OUTPATIENT
Start: 2019-05-10 | End: 2019-07-17 | Stop reason: HOSPADM

## 2019-05-10 NOTE — TELEPHONE ENCOUNTER
----- Message from Meg Dexter MA sent at 5/7/2019  4:20 PM CDT -----      ----- Message -----  From: Ramón Westbrook MD  Sent: 5/7/2019   9:12 AM  To: [facility] Staff    Lab results confirm severe iron deficiency.  Please schedule him for 2 weekly doses of IV injectafer when possible.  Also please schedule Gastroenterology consultation to discuss endoscopic evaluation for iron deficiency.  I would like to see the patient back for follow-up in clinic in 4 months with CBC, CMP, iron studies, CRP, ferritin labs 1-2 days before clinic visit.  All orders are in.  Thank you.

## 2019-05-13 ENCOUNTER — TELEPHONE (OUTPATIENT)
Dept: RADIOLOGY | Facility: HOSPITAL | Age: 74
End: 2019-05-13

## 2019-05-14 ENCOUNTER — HOSPITAL ENCOUNTER (OUTPATIENT)
Dept: RADIOLOGY | Facility: HOSPITAL | Age: 74
Discharge: HOME | End: 2019-05-14
Attending: PHYSICIAN ASSISTANT
Payer: MEDICARE

## 2019-05-14 ENCOUNTER — TELEPHONE (OUTPATIENT)
Dept: GASTROENTEROLOGY | Facility: CLINIC | Age: 74
End: 2019-05-14

## 2019-05-14 DIAGNOSIS — K74.60 HEPATIC CIRRHOSIS, UNSPECIFIED HEPATIC CIRRHOSIS TYPE, UNSPECIFIED WHETHER ASCITES PRESENT: ICD-10-CM

## 2019-05-14 PROCEDURE — 76705 ECHO EXAM OF ABDOMEN: CPT | Mod: 26,,, | Performed by: RADIOLOGY

## 2019-05-14 PROCEDURE — 76705 US ABDOMEN LIMITED: ICD-10-PCS | Mod: 26,,, | Performed by: RADIOLOGY

## 2019-05-14 PROCEDURE — 76705 ECHO EXAM OF ABDOMEN: CPT | Mod: TC

## 2019-05-14 NOTE — TELEPHONE ENCOUNTER
Spoke to Carmen Diaz. She is the  at [facility]. Pt is scheduled for US today and there is a $120 co pay, which pt does not have today. She wanted to know if this is medically necessary to have. Advised yes is is medically necessary.   She verbalized understanding and pt will billed for the amount instead of having to pay today up front.

## 2019-05-15 ENCOUNTER — TELEPHONE (OUTPATIENT)
Dept: GASTROENTEROLOGY | Facility: CLINIC | Age: 74
End: 2019-05-15

## 2019-05-15 DIAGNOSIS — R93.2 ABNORMAL LIVER ULTRASOUND: Primary | ICD-10-CM

## 2019-05-15 DIAGNOSIS — K74.60 HEPATIC CIRRHOSIS, UNSPECIFIED HEPATIC CIRRHOSIS TYPE, UNSPECIFIED WHETHER ASCITES PRESENT: ICD-10-CM

## 2019-05-15 NOTE — TELEPHONE ENCOUNTER
Ultrasound showed a possible lesion in the liver which is new. It is indeterminate. An MRI is recommended. His AFP was normal which is reassuring. Follow up in Hepatology clinic after imaging.   Thanks,   Phi

## 2019-05-16 NOTE — TELEPHONE ENCOUNTER
Notified pt of results and recommendations. MRI scheduled for 05/31/19 at 715 am.   MRI dept wants to make sure pt has up to date labs done, either BMP/CMP or creatinine. If not they want it done on 05/24/19, pt already has an appt for an infusion at the [facility].

## 2019-05-17 ENCOUNTER — INFUSION (OUTPATIENT)
Dept: INFUSION THERAPY | Facility: HOSPITAL | Age: 74
End: 2019-05-17
Attending: INTERNAL MEDICINE
Payer: MEDICARE

## 2019-05-17 VITALS
RESPIRATION RATE: 18 BRPM | HEART RATE: 73 BPM | DIASTOLIC BLOOD PRESSURE: 95 MMHG | SYSTOLIC BLOOD PRESSURE: 157 MMHG | TEMPERATURE: 98 F | OXYGEN SATURATION: 95 %

## 2019-05-17 DIAGNOSIS — D50.0 IRON DEFICIENCY ANEMIA DUE TO CHRONIC BLOOD LOSS: Primary | ICD-10-CM

## 2019-05-17 PROCEDURE — 25000003 PHARM REV CODE 250: Performed by: INTERNAL MEDICINE

## 2019-05-17 PROCEDURE — 96365 THER/PROPH/DIAG IV INF INIT: CPT

## 2019-05-17 PROCEDURE — 63600175 PHARM REV CODE 636 W HCPCS: Performed by: INTERNAL MEDICINE

## 2019-05-17 PROCEDURE — 96375 TX/PRO/DX INJ NEW DRUG ADDON: CPT

## 2019-05-17 RX ORDER — METHYLPREDNISOLONE SOD SUCC 125 MG
125 VIAL (EA) INJECTION
Status: COMPLETED | OUTPATIENT
Start: 2019-05-17 | End: 2019-05-17

## 2019-05-17 RX ADMIN — METHYLPREDNISOLONE SODIUM SUCCINATE 125 MG: 125 INJECTION, POWDER, FOR SOLUTION INTRAMUSCULAR; INTRAVENOUS at 01:05

## 2019-05-17 RX ADMIN — FERRIC CARBOXYMALTOSE INJECTION 750 MG: 50 INJECTION, SOLUTION INTRAVENOUS at 01:05

## 2019-05-17 NOTE — PLAN OF CARE
"Problem: Adult Inpatient Plan of Care  Goal: Plan of Care Review  Outcome: Ongoing (interventions implemented as appropriate)  Pt states, " I am just tired, that is all"      "

## 2019-05-17 NOTE — DISCHARGE INSTRUCTIONS
.  [facility]  [address]  [address]  [phone] phone     [phone] fax  Hours of Operation: Monday- Friday 8:00am- 5:00pm  After hours phone  [phone]  Hematology / Oncology Physicians on call      Dr. Edwin West    Please call with any concerns regarding your appointment today.

## 2019-05-17 NOTE — PLAN OF CARE
Problem: Anemia  Goal: Anemia Symptom Improvement    Intervention: Monitor and Manage Anemia  Reviewed iv iron protocol with pt

## 2019-05-17 NOTE — PLAN OF CARE
Problem: Adult Inpatient Plan of Care  Goal: Patient-Specific Goal (Individualization)  Outcome: Ongoing (interventions implemented as appropriate)  Pt likes pillow and feet up

## 2019-05-24 ENCOUNTER — INFUSION (OUTPATIENT)
Dept: INFUSION THERAPY | Facility: HOSPITAL | Age: 74
End: 2019-05-24
Attending: INTERNAL MEDICINE
Payer: MEDICARE

## 2019-05-24 VITALS
DIASTOLIC BLOOD PRESSURE: 81 MMHG | OXYGEN SATURATION: 97 % | TEMPERATURE: 98 F | RESPIRATION RATE: 18 BRPM | HEART RATE: 68 BPM | SYSTOLIC BLOOD PRESSURE: 131 MMHG

## 2019-05-24 DIAGNOSIS — D50.0 IRON DEFICIENCY ANEMIA DUE TO CHRONIC BLOOD LOSS: Primary | ICD-10-CM

## 2019-05-24 PROCEDURE — 96375 TX/PRO/DX INJ NEW DRUG ADDON: CPT

## 2019-05-24 PROCEDURE — 63600175 PHARM REV CODE 636 W HCPCS: Performed by: INTERNAL MEDICINE

## 2019-05-24 PROCEDURE — 25000003 PHARM REV CODE 250: Performed by: INTERNAL MEDICINE

## 2019-05-24 PROCEDURE — 96365 THER/PROPH/DIAG IV INF INIT: CPT

## 2019-05-24 RX ORDER — METHYLPREDNISOLONE SOD SUCC 125 MG
125 VIAL (EA) INJECTION
Status: COMPLETED | OUTPATIENT
Start: 2019-05-24 | End: 2019-05-24

## 2019-05-24 RX ORDER — LEVOTHYROXINE SODIUM 137 UG/1
TABLET ORAL
Qty: 60 TABLET | Refills: 5 | Status: SHIPPED | OUTPATIENT
Start: 2019-05-24 | End: 2019-11-23 | Stop reason: SDUPTHER

## 2019-05-24 RX ADMIN — FERRIC CARBOXYMALTOSE INJECTION 750 MG: 50 INJECTION, SOLUTION INTRAVENOUS at 08:05

## 2019-05-24 RX ADMIN — METHYLPREDNISOLONE SODIUM SUCCINATE 125 MG: 125 INJECTION, POWDER, FOR SOLUTION INTRAMUSCULAR; INTRAVENOUS at 08:05

## 2019-05-24 NOTE — PLAN OF CARE
Problem: Adult Inpatient Plan of Care  Goal: Plan of Care Review  Outcome: Ongoing (interventions implemented as appropriate)  I just feel tired.

## 2019-05-24 NOTE — DISCHARGE INSTRUCTIONS
[facility]  [address]  [phone] phone     [phone] fax  Hours of Operation: Monday- Friday 8:00am- 5:00pm  After hours phone  [phone]  Hematology / Oncology Physicians on call      Dr. Edwin Bender                        Please call with any concerns regarding your appointment today.

## 2019-05-24 NOTE — PLAN OF CARE
Problem: Anemia  Goal: Anemia Symptom Improvement  Outcome: Ongoing (interventions implemented as appropriate)  Patient c/o fatigue

## 2019-05-29 ENCOUNTER — TELEPHONE (OUTPATIENT)
Dept: HEMATOLOGY/ONCOLOGY | Facility: CLINIC | Age: 74
End: 2019-05-29

## 2019-05-30 ENCOUNTER — TELEPHONE (OUTPATIENT)
Dept: RADIOLOGY | Facility: HOSPITAL | Age: 74
End: 2019-05-30

## 2019-05-31 ENCOUNTER — TELEPHONE (OUTPATIENT)
Dept: INTERNAL MEDICINE | Facility: CLINIC | Age: 74
End: 2019-05-31

## 2019-05-31 ENCOUNTER — TELEPHONE (OUTPATIENT)
Dept: CARDIOLOGY | Facility: CLINIC | Age: 74
End: 2019-05-31

## 2019-05-31 DIAGNOSIS — Z01.818 PRE-OP TESTING: ICD-10-CM

## 2019-05-31 DIAGNOSIS — I25.10 CORONARY ARTERY DISEASE INVOLVING NATIVE CORONARY ARTERY OF NATIVE HEART WITHOUT ANGINA PECTORIS: Primary | ICD-10-CM

## 2019-05-31 DIAGNOSIS — Z95.1 S/P CABG X 2: ICD-10-CM

## 2019-05-31 NOTE — TELEPHONE ENCOUNTER
----- Message from Elvira Smith sent at 5/31/2019  8:12 AM CDT -----  Contact: Pt/wife  Please give pt wife a call at [phone], she needs to see if the pt abdelrahman can be pushed up for a pre-op clearance and surgery is in [place]

## 2019-05-31 NOTE — TELEPHONE ENCOUNTER
5/31 Wife notified of appt in [facility] on 06/06. Cm  ----- Message from Elvira Smith sent at 5/31/2019  8:12 AM CDT -----  Contact: Pt/wife  Please give pt wife a call at [phone], she needs to see if the pt abdelrahman can be pushed up for a pre-op clearance and surgery is in [place]

## 2019-06-04 ENCOUNTER — HOSPITAL ENCOUNTER (OUTPATIENT)
Dept: RADIOLOGY | Facility: HOSPITAL | Age: 74
Discharge: HOME | End: 2019-06-04
Attending: NURSE PRACTITIONER
Payer: MEDICARE

## 2019-06-04 ENCOUNTER — CLINICAL SUPPORT (OUTPATIENT)
Dept: CARDIOLOGY | Facility: CLINIC | Age: 74
End: 2019-06-04
Payer: MEDICARE

## 2019-06-04 ENCOUNTER — OFFICE VISIT (OUTPATIENT)
Dept: INTERNAL MEDICINE | Facility: CLINIC | Age: 74
End: 2019-06-04
Payer: MEDICARE

## 2019-06-04 VITALS
RESPIRATION RATE: 20 BRPM | DIASTOLIC BLOOD PRESSURE: 80 MMHG | OXYGEN SATURATION: 99 % | SYSTOLIC BLOOD PRESSURE: 136 MMHG | HEART RATE: 67 BPM | TEMPERATURE: 98 F | BODY MASS INDEX: 37.52 KG/M2 | WEIGHT: 247.56 LBS | HEIGHT: 68 IN

## 2019-06-04 DIAGNOSIS — Z01.818 PRE-OP EXAMINATION: Primary | ICD-10-CM

## 2019-06-04 DIAGNOSIS — Z01.818 PRE-OP EXAMINATION: ICD-10-CM

## 2019-06-04 PROCEDURE — 93010 EKG 12-LEAD: ICD-10-PCS | Mod: S$GLB,,, | Performed by: INTERNAL MEDICINE

## 2019-06-04 PROCEDURE — 1101F PR PT FALLS ASSESS DOC 0-1 FALLS W/OUT INJ PAST YR: ICD-10-PCS | Mod: CPTII,S$GLB,, | Performed by: NURSE PRACTITIONER

## 2019-06-04 PROCEDURE — 3079F DIAST BP 80-89 MM HG: CPT | Mod: CPTII,S$GLB,, | Performed by: NURSE PRACTITIONER

## 2019-06-04 PROCEDURE — 3075F PR MOST RECENT SYSTOLIC BLOOD PRESS GE 130-139MM HG: ICD-10-PCS | Mod: CPTII,S$GLB,, | Performed by: NURSE PRACTITIONER

## 2019-06-04 PROCEDURE — 93010 ELECTROCARDIOGRAM REPORT: CPT | Mod: S$GLB,,, | Performed by: INTERNAL MEDICINE

## 2019-06-04 PROCEDURE — 99999 PR PBB SHADOW E&M-EST. PATIENT-LVL V: ICD-10-PCS | Mod: PBBFAC,,, | Performed by: NURSE PRACTITIONER

## 2019-06-04 PROCEDURE — 3075F SYST BP GE 130 - 139MM HG: CPT | Mod: CPTII,S$GLB,, | Performed by: NURSE PRACTITIONER

## 2019-06-04 PROCEDURE — 93005 ELECTROCARDIOGRAM TRACING: CPT | Mod: S$GLB,,, | Performed by: NURSE PRACTITIONER

## 2019-06-04 PROCEDURE — 71046 X-RAY EXAM CHEST 2 VIEWS: CPT | Mod: TC

## 2019-06-04 PROCEDURE — 71046 XR CHEST PA AND LATERAL: ICD-10-PCS | Mod: 26,,, | Performed by: RADIOLOGY

## 2019-06-04 PROCEDURE — 71046 X-RAY EXAM CHEST 2 VIEWS: CPT | Mod: 26,,, | Performed by: RADIOLOGY

## 2019-06-04 PROCEDURE — 99214 OFFICE O/P EST MOD 30 MIN: CPT | Mod: S$GLB,,, | Performed by: NURSE PRACTITIONER

## 2019-06-04 PROCEDURE — 99214 PR OFFICE/OUTPT VISIT, EST, LEVL IV, 30-39 MIN: ICD-10-PCS | Mod: S$GLB,,, | Performed by: NURSE PRACTITIONER

## 2019-06-04 PROCEDURE — 93005 EKG 12-LEAD: ICD-10-PCS | Mod: S$GLB,,, | Performed by: NURSE PRACTITIONER

## 2019-06-04 PROCEDURE — 3079F PR MOST RECENT DIASTOLIC BLOOD PRESSURE 80-89 MM HG: ICD-10-PCS | Mod: CPTII,S$GLB,, | Performed by: NURSE PRACTITIONER

## 2019-06-04 PROCEDURE — 1101F PT FALLS ASSESS-DOCD LE1/YR: CPT | Mod: CPTII,S$GLB,, | Performed by: NURSE PRACTITIONER

## 2019-06-04 PROCEDURE — 99999 PR PBB SHADOW E&M-EST. PATIENT-LVL V: CPT | Mod: PBBFAC,,, | Performed by: NURSE PRACTITIONER

## 2019-06-04 RX ORDER — SILDENAFIL CITRATE 20 MG/1
TABLET ORAL
Qty: 30 TABLET | Refills: 3 | Status: SHIPPED | OUTPATIENT
Start: 2019-06-04 | End: 2020-03-09

## 2019-06-04 NOTE — PROGRESS NOTES
Subjective:       Patient ID: Erendira Pretty is a 73 y.o. male.    Chief Complaint: No chief complaint on file.    Patient presents for a pre-op clearance.  Has left knee arthroscopy scheduled for 06/13/2019.  Has appointment with cardiologist on tomorrow for cardiac clearance.      Review of Systems   Constitutional: Negative for chills and fatigue.   Respiratory: Negative for cough and shortness of breath.    Musculoskeletal: Positive for arthralgias, gait problem and myalgias.   Skin: Negative for color change and wound.   Psychiatric/Behavioral: Negative for agitation and confusion.       Objective:      Physical Exam   Constitutional: He is oriented to person, place, and time. Vital signs are normal. He appears well-developed and well-nourished.   HENT:   Head: Normocephalic and atraumatic.   Right Ear: External ear normal.   Left Ear: External ear normal.   Eyes: Pupils are equal, round, and reactive to light. EOM are normal.   Neck: Normal range of motion.   Cardiovascular: Normal rate.   Murmur heard.  Pulmonary/Chest: Effort normal and breath sounds normal.   Musculoskeletal: Normal range of motion. He exhibits no edema.   Neurological: He is alert and oriented to person, place, and time.   Skin: Skin is warm.   Psychiatric: He has a normal mood and affect. His behavior is normal.       Assessment:       1. Pre-op examination        Plan:         Pre-op examination  -     CBC auto differential; Future; Expected date: 06/04/2019  -     Comprehensive metabolic panel; Future; Expected date: 06/04/2019  -     Urinalysis; Future; Expected date: 06/04/2019  -     X-Ray Chest PA And Lateral; Future; Expected date: 06/04/2019  -     SCHEDULED EKG 12-LEAD (to Muse); Future    Other orders  -     sildenafil (REVATIO) 20 mg Tab; Take 2 tables by mouth one hour prior to intercourse.  Max 2 per 24 hours  Dispense: 30 tablet; Refill: 3        Labs and imaging today.  Follow up with cardiologist as schedule.  Will fax  form and labs to surgeon when available.

## 2019-06-06 ENCOUNTER — OFFICE VISIT (OUTPATIENT)
Dept: CARDIOLOGY | Facility: CLINIC | Age: 74
End: 2019-06-06
Payer: MEDICARE

## 2019-06-06 VITALS
HEART RATE: 72 BPM | HEIGHT: 68 IN | DIASTOLIC BLOOD PRESSURE: 76 MMHG | WEIGHT: 247.13 LBS | SYSTOLIC BLOOD PRESSURE: 132 MMHG | BODY MASS INDEX: 37.46 KG/M2

## 2019-06-06 DIAGNOSIS — I10 ESSENTIAL HYPERTENSION: Primary | ICD-10-CM

## 2019-06-06 DIAGNOSIS — I49.3 PVC (PREMATURE VENTRICULAR CONTRACTION): ICD-10-CM

## 2019-06-06 DIAGNOSIS — Z01.810 PREOP CARDIOVASCULAR EXAM: ICD-10-CM

## 2019-06-06 DIAGNOSIS — I73.9 PVD (PERIPHERAL VASCULAR DISEASE): ICD-10-CM

## 2019-06-06 DIAGNOSIS — I73.9 CLAUDICATION: ICD-10-CM

## 2019-06-06 DIAGNOSIS — Z95.1 S/P CABG X 2: ICD-10-CM

## 2019-06-06 DIAGNOSIS — G47.33 OSA ON CPAP: Chronic | ICD-10-CM

## 2019-06-06 DIAGNOSIS — I25.10 CORONARY ARTERY DISEASE INVOLVING NATIVE CORONARY ARTERY OF NATIVE HEART WITHOUT ANGINA PECTORIS: ICD-10-CM

## 2019-06-06 DIAGNOSIS — E78.2 MIXED HYPERLIPIDEMIA: ICD-10-CM

## 2019-06-06 PROCEDURE — 99214 OFFICE O/P EST MOD 30 MIN: CPT | Mod: S$GLB,,, | Performed by: INTERNAL MEDICINE

## 2019-06-06 PROCEDURE — 3075F PR MOST RECENT SYSTOLIC BLOOD PRESS GE 130-139MM HG: ICD-10-PCS | Mod: CPTII,S$GLB,, | Performed by: INTERNAL MEDICINE

## 2019-06-06 PROCEDURE — 99214 PR OFFICE/OUTPT VISIT, EST, LEVL IV, 30-39 MIN: ICD-10-PCS | Mod: S$GLB,,, | Performed by: INTERNAL MEDICINE

## 2019-06-06 PROCEDURE — 3075F SYST BP GE 130 - 139MM HG: CPT | Mod: CPTII,S$GLB,, | Performed by: INTERNAL MEDICINE

## 2019-06-06 PROCEDURE — 99999 PR PBB SHADOW E&M-EST. PATIENT-LVL III: ICD-10-PCS | Mod: PBBFAC,,, | Performed by: INTERNAL MEDICINE

## 2019-06-06 PROCEDURE — 99999 PR PBB SHADOW E&M-EST. PATIENT-LVL III: CPT | Mod: PBBFAC,,, | Performed by: INTERNAL MEDICINE

## 2019-06-06 PROCEDURE — 1101F PR PT FALLS ASSESS DOC 0-1 FALLS W/OUT INJ PAST YR: ICD-10-PCS | Mod: CPTII,S$GLB,, | Performed by: INTERNAL MEDICINE

## 2019-06-06 PROCEDURE — 3078F DIAST BP <80 MM HG: CPT | Mod: CPTII,S$GLB,, | Performed by: INTERNAL MEDICINE

## 2019-06-06 PROCEDURE — 3078F PR MOST RECENT DIASTOLIC BLOOD PRESSURE < 80 MM HG: ICD-10-PCS | Mod: CPTII,S$GLB,, | Performed by: INTERNAL MEDICINE

## 2019-06-06 PROCEDURE — 1101F PT FALLS ASSESS-DOCD LE1/YR: CPT | Mod: CPTII,S$GLB,, | Performed by: INTERNAL MEDICINE

## 2019-06-06 NOTE — PROGRESS NOTES
Subjective:   Patient ID:  Erendira Pretty is a 73 y.o. male who presents for follow-up of Pre-op Exam  Pt planned L knee arthroscopy.Patient denies CP, angina or anginal equivalent.  HB 12  Hypertension   This is a chronic problem. The current episode started more than 1 year ago. The problem has been gradually improving since onset. The problem is controlled. Pertinent negatives include no chest pain, palpitations or shortness of breath. Past treatments include beta blockers and diuretics. The current treatment provides moderate improvement. Compliance problems include exercise.    Hyperlipidemia   This is a chronic problem. The current episode started more than 1 year ago. The problem is controlled. Recent lipid tests were reviewed and are variable. Pertinent negatives include no chest pain or shortness of breath. Current antihyperlipidemic treatment includes statins. The current treatment provides moderate improvement of lipids. Compliance problems include adherence to exercise.    Coronary Artery Disease   Presents for follow-up visit. Pertinent negatives include no chest pain, chest pressure, chest tightness, dizziness, leg swelling, muscle weakness, palpitations, shortness of breath or weight gain. Risk factors include hyperlipidemia. The symptoms have been stable. Compliance with diet is variable. Compliance with exercise is variable. Compliance with medications is good.       Review of Systems   Constitution: Negative. Negative for weight gain.   HENT: Negative.    Eyes: Negative.    Cardiovascular: Negative.  Negative for chest pain, leg swelling and palpitations.   Respiratory: Negative.  Negative for chest tightness and shortness of breath.    Endocrine: Negative.    Hematologic/Lymphatic: Negative.    Skin: Negative.    Musculoskeletal: Negative for muscle weakness.   Gastrointestinal: Negative.    Genitourinary: Negative.    Neurological: Negative.  Negative for dizziness.   Psychiatric/Behavioral:  Negative.    Allergic/Immunologic: Negative.      Family History   Problem Relation Age of Onset    Heart disease Mother     Heart disease Father     Heart disease Brother     Colon cancer Neg Hx      Past Medical History:   Diagnosis Date    Aortic stenosis     Asthma     BPH (benign prostatic hyperplasia)     CAD (coronary artery disease)     40% lesion ;lazo    Cirrhosis     Claudication 2014    Colon polyp     COPD (chronic obstructive pulmonary disease)     ED (erectile dysfunction)     Encounter for blood transfusion     Ex-smoker     Hearing loss NEC     Hepatitis C     Cured;reji brown     Hyperlipidemia     Hypertension     Hypothyroid     s/p tx graves    DELONTE on CPAP     Osteoarthritis     PVD (peripheral vascular disease)     Type 2 diabetes mellitus      Social History     Socioeconomic History    Marital status:      Spouse name: YAEL    Number of children: 2    Years of education: Not on file    Highest education level: Not on file   Occupational History    Not on file   Social Needs    Financial resource strain: Not on file    Food insecurity:     Worry: Not on file     Inability: Not on file    Transportation needs:     Medical: Not on file     Non-medical: Not on file   Tobacco Use    Smoking status: Former Smoker     Packs/day: 0.50     Years: 4.00     Pack years: 2.00     Last attempt to quit: 1972     Years since quittin.0    Smokeless tobacco: Former User     Types: Chew     Quit date: 1976   Substance and Sexual Activity    Alcohol use: Yes     Alcohol/week: 1.2 oz     Types: 2 Cans of beer per week     Comment: daily  No alcohol prior to surgery    Drug use: No    Sexual activity: Yes     Partners: Female   Lifestyle    Physical activity:     Days per week: Not on file     Minutes per session: Not on file    Stress: Not on file   Relationships    Social connections:     Talks on phone: Not on file     Gets  together: Not on file     Attends Sikh service: Not on file     Active member of club or organization: Not on file     Attends meetings of clubs or organizations: Not on file     Relationship status: Not on file   Other Topics Concern    Not on file   Social History Narrative    . Lives with spouse. 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      Current Outpatient Medications on File Prior to Visit   Medication Sig Dispense Refill    ACCU-CHEK GRACE PLUS METER Misc AS DIRECTED  0    ACCU-CHEK GRACE PLUS TEST STRP Strp TEST THREE TIMES A  strip 11    albuterol-ipratropium (DUO-NEB) 2.5 mg-0.5 mg/3 mL nebulizer solution Take 3 mLs by nebulization 2 (two) times daily. Rescue 120 vial 5    aspirin (ECOTRIN) 81 MG EC tablet Take 1 tablet (81 mg total) by mouth once daily.  0    azelastine (ASTELIN) 137 mcg (0.1 %) nasal spray 2 sprays (274 mcg total) by Nasal route 2 (two) times daily. 30 mL 6    budesonide-formoterol 160-4.5 mcg (SYMBICORT) 160-4.5 mcg/actuation HFAA INHALE 2 PUFFS INTO THE LUNGS TWO TIMES A DAY 10.2 g 11    docusate sodium (COLACE) 100 MG capsule Take 1 capsule (100 mg total) by mouth 2 (two) times daily. 60 capsule 0    finasteride (PROSCAR) 5 mg tablet Take 1 tablet (5 mg total) by mouth once daily. 30 tablet 11    furosemide (LASIX) 40 MG tablet Take 1 tablet (40 mg) by mouth 2 times a day and 1 extra if needed for 14 days. 90 tablet 5    GLUCOSAMINE HCL/CHONDR ROSARIO A NA (OSTEO BI-FLEX ORAL) Take 1 tablet by mouth 2 (two) times daily.       HUMALOG KWIKPEN INSULIN 100 unit/mL pen INJECT 3-4 UNITS INTO THE SKIN THREE TIMES DAILY BEFORE MEALS. 3 mL 11    insulin glargine (LANTUS SOLOSTAR) 100 unit/mL (3 mL) InPn pen Inject 40 Units into the skin once daily. 5 Syringe 6    krill oil 500 mg Cap Take by mouth.      lancets (ACCU-CHEK FASTCLIX LANCET DRUM) Misc TEST TWO TIMES A  each 5    levocetirizine (XYZAL) 5 MG tablet Take 1  "tablet (5 mg total) by mouth every evening. 30 tablet 11    metoprolol succinate (TOPROL-XL) 50 MG 24 hr tablet Take 1 tablet (50 mg total) by mouth 2 (two) times daily. 60 tablet 11    milk thistle 175 mg tablet Take 175 mg by mouth once daily.      montelukast (SINGULAIR) 10 mg tablet Take 1 tablet (10 mg total) by mouth once daily. 30 tablet 11    MV,CA,IRON,MIN/FA/PHYTOSTEROL (CENTRUM SPECIALIST HEART ORAL) Take 1 tablet by mouth once daily.       pen needle, diabetic (BD INSULIN PEN NEEDLE UF MINI) 31 gauge x 3/16" Ndle USE AS DIRECTED 100 each 11    polyethylene glycol (GLYCOLAX) 17 gram/dose powder Take 17 g ( 1 capful) by mouth once daily. 527 g 0    RABEprazole (ACIPHEX) 20 mg tablet Take 20 mg by mouth once daily.      sildenafil (REVATIO) 20 mg Tab Take 2 tables by mouth one hour prior to intercourse.  Max 2 per 24 hours 30 tablet 3    SUPREP BOWEL PREP KIT 17.5-3.13-1.6 gram SolR Use as directed 354 mL 0    SYNTHROID 137 mcg Tab tablet TAKE 2 TABLETS BY MOUTH BEFORE BREAKFAST. 60 tablet 5     Current Facility-Administered Medications on File Prior to Visit   Medication Dose Route Frequency Provider Last Rate Last Dose    lactated ringers infusion   Intravenous Continuous Omaira Theodore MD         Review of patient's allergies indicates:   Allergen Reactions    Lipitor [atorvastatin] Other (See Comments)     Muscle aches and pains as well as fatigue.     Niacin preparations Other (See Comments)     Causes broken blood vessels and bruises    Iodinated contrast- oral and iv dye Hives     Tachycardia    Omeprazole Hives and Itching    Prilosec [omeprazole magnesium] Hives and Itching       Objective:     Physical Exam   Constitutional: He is oriented to person, place, and time. He appears well-developed and well-nourished.   HENT:   Head: Normocephalic and atraumatic.   Eyes: Pupils are equal, round, and reactive to light. Conjunctivae are normal.   Neck: Normal range of motion. Neck " supple.   Cardiovascular: Normal rate, regular rhythm, normal heart sounds and intact distal pulses.   Pulmonary/Chest: Effort normal and breath sounds normal.   Abdominal: Soft. Bowel sounds are normal.   Neurological: He is alert and oriented to person, place, and time.   Skin: Skin is warm and dry.   Psychiatric: He has a normal mood and affect.   Nursing note and vitals reviewed.      Assessment:     1. Essential hypertension    2. PVC (premature ventricular contraction)    3. Coronary artery disease involving native coronary artery of native heart without angina pectoris    4. PVD (peripheral vascular disease)    5. Claudication    6. Mixed hyperlipidemia    7. S/P CABG x 2    8. DELONTE on CPAP    9. Preop cardiovascular exam        Plan:     Essential hypertension    PVC (premature ventricular contraction)    Coronary artery disease involving native coronary artery of native heart without angina pectoris    PVD (peripheral vascular disease)    Claudication    Mixed hyperlipidemia    S/P CABG x 2    DELONTE on CPAP    Preop cardiovascular exam    Pt cleared for procedure at moderate CV risk  Continue metoprolol, lasix-htn  Continue asa- cad  Continue Fe  Continue f/u hematology  Check lipids

## 2019-06-11 ENCOUNTER — TELEPHONE (OUTPATIENT)
Dept: INTERNAL MEDICINE | Facility: CLINIC | Age: 74
End: 2019-06-11

## 2019-06-11 NOTE — TELEPHONE ENCOUNTER
----- Message from Henry Casiano sent at 6/11/2019 11:13 AM CDT -----  Contact: DELFINA Ledezmaaine // Dr Monty Kramer [facility] is calling for a letter Clearance and lab results from the last visit.  for pt surgery on tomorrow.       Call back [phone] and Fax [phone]    Thanks

## 2019-06-12 ENCOUNTER — TELEPHONE (OUTPATIENT)
Dept: INTERNAL MEDICINE | Facility: CLINIC | Age: 74
End: 2019-06-12

## 2019-06-12 NOTE — TELEPHONE ENCOUNTER
----- Message from Elba Monsivais sent at 6/12/2019 10:13 AM CDT -----  Type:  Needs Medical Advice    Who Called:  Pt  Erendira  Symptoms (please be specific):   HAVING SURGERY TOMORROW//6-13-19  How long has patient had these symptoms:     Pharmacy name and phone #:     Would the patient rather a call back or a response via [facility]?  Call back  Best Call Back Number:[phone]  Or [phone]  Additional Information:  States he is going to have Knee Surgery tomorrow//6-13-19//His preop was done on 6-4-19 by Ms Duarte//he has left several messages because his surgeon has  Not received the pre op///'s fax is [phone]//please call pt asap//olya/kristina

## 2019-06-27 RX ORDER — SILDENAFIL CITRATE 20 MG/1
TABLET ORAL
Qty: 90 TABLET | Refills: 11 | Status: ON HOLD | OUTPATIENT
Start: 2019-06-27 | End: 2020-03-03 | Stop reason: HOSPADM

## 2019-06-28 ENCOUNTER — TELEPHONE (OUTPATIENT)
Dept: RADIOLOGY | Facility: HOSPITAL | Age: 74
End: 2019-06-28

## 2019-07-01 ENCOUNTER — HOSPITAL ENCOUNTER (OUTPATIENT)
Dept: RADIOLOGY | Facility: HOSPITAL | Age: 74
Discharge: HOME | End: 2019-07-01
Attending: PHYSICIAN ASSISTANT
Payer: MEDICARE

## 2019-07-01 DIAGNOSIS — K74.60 HEPATIC CIRRHOSIS, UNSPECIFIED HEPATIC CIRRHOSIS TYPE, UNSPECIFIED WHETHER ASCITES PRESENT: ICD-10-CM

## 2019-07-01 DIAGNOSIS — R93.2 ABNORMAL LIVER ULTRASOUND: ICD-10-CM

## 2019-07-01 PROCEDURE — A9585 GADOBUTROL INJECTION: HCPCS | Performed by: PHYSICIAN ASSISTANT

## 2019-07-01 PROCEDURE — 74183 MRI ABD W/O CNTR FLWD CNTR: CPT | Mod: 26,,, | Performed by: RADIOLOGY

## 2019-07-01 PROCEDURE — 74183 MRI ABD W/O CNTR FLWD CNTR: CPT | Mod: TC

## 2019-07-01 PROCEDURE — 25500020 PHARM REV CODE 255: Performed by: PHYSICIAN ASSISTANT

## 2019-07-01 PROCEDURE — 74183 MRI ABDOMEN W WO CONTRAST: ICD-10-PCS | Mod: 26,,, | Performed by: RADIOLOGY

## 2019-07-01 RX ORDER — FINASTERIDE 5 MG/1
TABLET, FILM COATED ORAL
Qty: 30 TABLET | Refills: 11 | Status: SHIPPED | OUTPATIENT
Start: 2019-07-01 | End: 2019-07-15 | Stop reason: SDUPTHER

## 2019-07-01 RX ORDER — GADOBUTROL 604.72 MG/ML
10 INJECTION INTRAVENOUS
Status: COMPLETED | OUTPATIENT
Start: 2019-07-01 | End: 2019-07-01

## 2019-07-01 RX ADMIN — GADOBUTROL 10 ML: 604.72 INJECTION INTRAVENOUS at 07:07

## 2019-07-08 ENCOUNTER — LAB VISIT (OUTPATIENT)
Dept: LAB | Facility: HOSPITAL | Age: 74
End: 2019-07-08
Attending: UROLOGY
Payer: MEDICARE

## 2019-07-08 ENCOUNTER — TELEPHONE (OUTPATIENT)
Dept: TRANSPLANT | Facility: CLINIC | Age: 74
End: 2019-07-08

## 2019-07-08 DIAGNOSIS — R36.1 HEMATOSPERMIA: ICD-10-CM

## 2019-07-08 DIAGNOSIS — N40.0 BENIGN PROSTATIC HYPERPLASIA, UNSPECIFIED WHETHER LOWER URINARY TRACT SYMPTOMS PRESENT: ICD-10-CM

## 2019-07-08 DIAGNOSIS — E11.9 TYPE 2 DIABETES MELLITUS WITHOUT COMPLICATION, WITHOUT LONG-TERM CURRENT USE OF INSULIN: ICD-10-CM

## 2019-07-08 LAB
COMPLEXED PSA SERPL-MCNC: 0.08 NG/ML (ref 0–4)
ESTIMATED AVG GLUCOSE: 151 MG/DL (ref 68–131)
HBA1C MFR BLD HPLC: 6.9 % (ref 4–5.6)

## 2019-07-08 PROCEDURE — 36415 COLL VENOUS BLD VENIPUNCTURE: CPT | Mod: PO

## 2019-07-08 PROCEDURE — 84153 ASSAY OF PSA TOTAL: CPT

## 2019-07-08 PROCEDURE — 83036 HEMOGLOBIN GLYCOSYLATED A1C: CPT

## 2019-07-08 NOTE — TELEPHONE ENCOUNTER
Patient: Erendira Pretty       MRN: [id number]      : 1945     Age: 74 y.o.  [address] [place] [address]    Provider: Hepatologist Ashleigh Tomas    Urgency of review: non-urgent    Patient Transplant Status: Other Never evaluated    Reason for presentation: Indeterminate lesion    Clinical Summary:   73 yo male referred to me for iron deficiency anemia. Last seen by seen by Dr. Tomas in May 2018 for compensated liver cirrhosis. I ordered an US to up date his HCC screening. He was noted to have stable cysts but also had an indeterminate 13 mm hypoechoic focus in the right hepatic lobe. This was followed by MRI which showed a new 15 mm arterially enhancing lesion in the right hepatic lobe concerning for HCC.     Imaging to be reviewed: MRI Abd - 19    HCC Treatment History: None    ABO: O POS    Platelets:   Lab Results   Component Value Date/Time     (L) 2019 08:32 AM     Creatinine:   Lab Results   Component Value Date/Time    CREATININE 1.0 2019 08:32 AM     Bilirubin:   Lab Results   Component Value Date/Time    BILITOT 0.5 2019 08:32 AM     AFP Last 3 each if available:   Lab Results   Component Value Date/Time    AFP 4.5 05/10/2019 08:34 AM    AFP 5.3 2018 07:40 AM    AFP 5.8 10/16/2017 08:42 AM       MELD: MELD-Na score: 8 at 2018  4:37 PM  MELD score: 8 at 2018  4:37 PM  Calculated from:  Serum Creatinine: 1.0 mg/dL at 2018  4:37 PM  Serum Sodium: 137 mmol/L at 2018  4:37 PM  Total Bilirubin: 0.7 mg/dL (Rounded to 1 mg/dL) at 2018 10:16 AM  INR(ratio): 1.2 at 2018  1:32 PM  Age: 73 years    Plan:     Follow-up Provider:

## 2019-07-15 ENCOUNTER — OFFICE VISIT (OUTPATIENT)
Dept: UROLOGY | Facility: CLINIC | Age: 74
End: 2019-07-15
Payer: MEDICARE

## 2019-07-15 ENCOUNTER — PROCEDURE VISIT (OUTPATIENT)
Dept: PULMONOLOGY | Facility: CLINIC | Age: 74
End: 2019-07-15
Payer: MEDICARE

## 2019-07-15 ENCOUNTER — PATIENT OUTREACH (OUTPATIENT)
Dept: PULMONOLOGY | Facility: CLINIC | Age: 74
End: 2019-07-15

## 2019-07-15 VITALS
DIASTOLIC BLOOD PRESSURE: 80 MMHG | WEIGHT: 245.94 LBS | SYSTOLIC BLOOD PRESSURE: 132 MMHG | BODY MASS INDEX: 37.27 KG/M2 | HEIGHT: 68 IN

## 2019-07-15 VITALS
HEART RATE: 79 BPM | OXYGEN SATURATION: 96 % | WEIGHT: 246.13 LBS | HEIGHT: 68 IN | RESPIRATION RATE: 18 BRPM | BODY MASS INDEX: 37.3 KG/M2

## 2019-07-15 DIAGNOSIS — N40.0 BENIGN PROSTATIC HYPERPLASIA, UNSPECIFIED WHETHER LOWER URINARY TRACT SYMPTOMS PRESENT: ICD-10-CM

## 2019-07-15 DIAGNOSIS — J45.909 ASTHMA: Primary | ICD-10-CM

## 2019-07-15 DIAGNOSIS — Z12.5 PROSTATE CANCER SCREENING: Primary | ICD-10-CM

## 2019-07-15 LAB
BILIRUB SERPL-MCNC: NORMAL MG/DL
BLOOD URINE, POC: NORMAL
COLOR, POC UA: YELLOW
GLUCOSE UR QL STRIP: NORMAL
KETONES UR QL STRIP: NORMAL
LEUKOCYTE ESTERASE URINE, POC: NORMAL
NITRITE, POC UA: NORMAL
PH, POC UA: 5
PROTEIN, POC: NORMAL
SPECIFIC GRAVITY, POC UA: 1.02
UROBILINOGEN, POC UA: NORMAL

## 2019-07-15 PROCEDURE — 3075F SYST BP GE 130 - 139MM HG: CPT | Mod: CPTII,S$GLB,, | Performed by: UROLOGY

## 2019-07-15 PROCEDURE — 99214 OFFICE O/P EST MOD 30 MIN: CPT | Mod: 25,S$GLB,, | Performed by: UROLOGY

## 2019-07-15 PROCEDURE — 99999 PR PBB SHADOW E&M-EST. PATIENT-LVL III: ICD-10-PCS | Mod: PBBFAC,,, | Performed by: UROLOGY

## 2019-07-15 PROCEDURE — 3075F PR MOST RECENT SYSTOLIC BLOOD PRESS GE 130-139MM HG: ICD-10-PCS | Mod: CPTII,S$GLB,, | Performed by: UROLOGY

## 2019-07-15 PROCEDURE — 81002 URINALYSIS NONAUTO W/O SCOPE: CPT | Mod: S$GLB,,, | Performed by: UROLOGY

## 2019-07-15 PROCEDURE — 1101F PR PT FALLS ASSESS DOC 0-1 FALLS W/OUT INJ PAST YR: ICD-10-PCS | Mod: CPTII,S$GLB,, | Performed by: UROLOGY

## 2019-07-15 PROCEDURE — 3079F DIAST BP 80-89 MM HG: CPT | Mod: CPTII,S$GLB,, | Performed by: UROLOGY

## 2019-07-15 PROCEDURE — 99214 PR OFFICE/OUTPT VISIT, EST, LEVL IV, 30-39 MIN: ICD-10-PCS | Mod: 25,S$GLB,, | Performed by: UROLOGY

## 2019-07-15 PROCEDURE — 81002 POCT URINE DIPSTICK WITHOUT MICROSCOPE: ICD-10-PCS | Mod: S$GLB,,, | Performed by: UROLOGY

## 2019-07-15 PROCEDURE — 99999 PR PBB SHADOW E&M-EST. PATIENT-LVL III: CPT | Mod: PBBFAC,,, | Performed by: UROLOGY

## 2019-07-15 PROCEDURE — 3079F PR MOST RECENT DIASTOLIC BLOOD PRESSURE 80-89 MM HG: ICD-10-PCS | Mod: CPTII,S$GLB,, | Performed by: UROLOGY

## 2019-07-15 PROCEDURE — 94664 PR DEMO &/OR EVAL,PT USE,AEROSOL DEVICE: ICD-10-PCS | Mod: S$GLB,,, | Performed by: INTERNAL MEDICINE

## 2019-07-15 PROCEDURE — 1101F PT FALLS ASSESS-DOCD LE1/YR: CPT | Mod: CPTII,S$GLB,, | Performed by: UROLOGY

## 2019-07-15 PROCEDURE — 94664 DEMO&/EVAL PT USE INHALER: CPT | Mod: S$GLB,,, | Performed by: INTERNAL MEDICINE

## 2019-07-15 RX ORDER — FINASTERIDE 5 MG/1
5 TABLET, FILM COATED ORAL DAILY
Qty: 30 TABLET | Refills: 11 | Status: SHIPPED | OUTPATIENT
Start: 2019-07-15 | End: 2020-03-09

## 2019-07-15 NOTE — PROGRESS NOTES
"Pulmonary Disease Management  [facility]  Initial Visit  Chronic Care Management        Referring Provider: Tracy  Diagnosis: Asthma, chronic wheezy bronchitis  Last Hospital Admission: 2019 (CABG)  Last provider visit:       Current Outpatient Medications:     albuterol-ipratropium (DUO-NEB) 2.5 mg-0.5 mg/3 mL nebulizer solution, Take 3 mLs by nebulization 2 (two) times daily. Rescue, Disp: 120 vial, Rfl: 5    azelastine (ASTELIN) 137 mcg (0.1 %) nasal spray, 2 sprays (274 mcg total) by Nasal route 2 (two) times daily., Disp: 30 mL, Rfl: 6    budesonide-formoterol 160-4.5 mcg (SYMBICORT) 160-4.5 mcg/actuation HFAA, INHALE 2 PUFFS INTO THE LUNGS TWO TIMES A DAY, Disp: 10.2 g, Rfl: 11    docusate sodium (COLACE) 100 MG capsule, Take 1 capsule (100 mg total) by mouth 2 (two) times daily., Disp: 60 capsule, Rfl: 0    furosemide (LASIX) 40 MG tablet, Take 1 tablet (40 mg) by mouth 2 times a day and 1 extra if needed for 14 days., Disp: 90 tablet, Rfl: 5          Review of patient's allergies indicates:   Allergen Reactions    Lipitor [atorvastatin] Other (See Comments)     Muscle aches and pains as well as fatigue.     Niacin preparations Other (See Comments)     Causes broken blood vessels and bruises    Iodinated contrast- oral and iv dye Hives     Tachycardia    Omeprazole Hives and Itching    Prilosec [omeprazole magnesium] Hives and Itching       Smoking history:   Social History     Tobacco Use   Smoking Status Former Smoker    Packs/day: 0.50    Years: 4.00    Pack years: 2.00    Last attempt to quit: 1972    Years since quittin.1   Smokeless Tobacco Former User    Types: Chew    Quit date: 1976       Pulse: 79  SpO2: 96 %     Resp: 18  Height: 5' 8" (172.7 cm)  Weight: 111.7 kg (246 lb 2.3 oz)  BMI (kg/m2): 37.5  Resting Nena Dyspnea Rating : 0      Current Oxygen Use: No           ACT Questionnaire:  Asthma Control Test  In the past 4  weeks, how much of the " time did your asthma keep you from getting as much done at work, school or at home?: None of the time  During the past 4 weeks, how often have you had shortness of breath?: Not at all  During the past 4 weeks, how often did your asthma symptoms (wheezing, couging, shortness of breath, chest tightness or pain) wake you up at night or earlier than usual in the morning?: Not at all  During the past 4 weeks, how often have you used your rescue inhaler or nebulizer medication (such as albuterol)?: Not at all  How would you rate your asthma control during the past 4 weeks?: Well controlled  If your score is 19 or less, your asthma may not be under control: 24    [place] Sleepiness Scale:  [place] Sleepiness Scale  Sitting and reading: Slight chance of dozing  Watching TV: High chance of dozing  Sitting, inactive in a public place (e.g. a theatre or a meeting): Would never doze  As a passenger in a car for an hour without a break: Would never doze  Lying down to rest in the afternoon when circumstances permit: High chance of dozing  Sitting and talking to someone: Would never doze  Sitting quietly after a lunch without alcohol: Moderate chance of dozing  In a car, while stopped for a few minutes in traffic: Would never doze  Total score: 9      Has this patient had any pulmonary studies (PFTS)?: No  PFT Results:  Pre FVC   Date/Time Value Ref Range Status   03/21/2019 05:33 PM 3.34 2.86 - 4.90 L Final     Pre FEV1   Date/Time Value Ref Range Status   03/21/2019 05:33 PM 2.41 2.09 - 3.77 L Final     Pre FEV1 FVC   Date/Time Value Ref Range Status   03/21/2019 05:33 PM 72.18 62.08 - 89.75 % Final     Pre TLC   Date/Time Value Ref Range Status   09/27/2018 11:50 AM 6.15 5.59 - 7.89 L Final     Pre DLCO   Date/Time Value Ref Range Status   09/27/2018 11:50 AM 23.66 18.27 - 32.12 ml/(min*mmHg) Final       Is this patient a candidate for pulmonary rehab?: No     Method Used for Education: Literature, Demonstration, Verbal           Patient Concerns or Expectations:   Patient states he uses controller medications seasonally. (See Therapist Comments)  Patient states he does not want monthly phone calls and he will call if he needs anything.     Therapist Comments:     Erendira Pretty  was seen 7/15/2019  1:30 PM in the Pulmonary Disease management clinic for evaluation, educate and reinforce self management techniques and exacerbation action plan. Patient practiced proper technique using MDI with valved holding chamber (spacer). Patient demonstrated understanding. Literature was given to patient.     Reviewed with patient the difference in controller (Symbicort) and rescue (ventolin) medications.  Patient verbalized understanding.    Patient states he takes montelukast daily. Informed patient that montelukast helps prevent wheezing and allergic rhinitis.     Asthma trigger checklist was verbally reviewed and literature given to patient.     Asthma action plan was reviewed and literature was given to patient. Patient verbalized understanding.       Cheryl Kenny    Past Medical History:   Diagnosis Date    Aortic stenosis     Asthma     BPH (benign prostatic hyperplasia)     CAD (coronary artery disease)     40% lesion 2002;lazo    Cirrhosis     Claudication 4/9/2014    Colon polyp     COPD (chronic obstructive pulmonary disease)     ED (erectile dysfunction)     Encounter for blood transfusion     Ex-smoker     Hearing loss NEC     Hepatitis C     Cured;reji brown 2015    Hyperlipidemia     Hypertension     Hypothyroid     s/p tx graves    DELONTE on CPAP     Osteoarthritis     PVD (peripheral vascular disease)     Type 2 diabetes mellitus              Educational assessment:   [x]            Good  []            Fair  []            Poor    Readiness to learn:   [x]            Good  []            Fair  []            Poor    Vision Status:   [x]            Good  []            Fair  []            Poor    Reading  Ability:  [x]            Good  []            Fair  []            Poor    Knowledge of condition:   [x]            Good  []            Fair  []            Poor    Language Barriers:   []            Good  []            Fair  []            Poor  [x]            None    Cognitive/ Physical Barriers:   []            Good  []            Fair  []            Poor  [x]            None    Learning best by:                       []            Seeing  []            Hearing  []            Reading                         [x]            Doing    Describe any barrier /Limitation or financial implications of care choices identified     []            Financial  []            Emotional  []            Education  []            Vision/Hearing  []            Physical  [x]            None  []                TOPIC /CONTENT FOR TODAY:    [x]            MDI with or without spacer  []            Dry power inhaler  []            Accapella   []           Peak Flow meter  [x]            Asthma action plan  []            Nebulizer use  []            Oxygen use safety  []            Breathing and cough techniques  []            Energy coservation  []            Infection prevention  []           OTHER________________________        Learner:    [x]            Patient   []            Caregiver    Method:    []            Verbal explanation  []            Audio visual    []            Literature  [x]            Teach back      Evaluation:    [x]            Teach back  []            Demonsatrate  []            Follow up phone call    []            2 weeks     [x]            4 weeks   []            PRN         Plan: (chronic care management)  Reinforce education  Meds: Symbicort, montelukast, ventolin  DME Needs: [facility]  Action Plan: Asthma  Immunization: Pneumococcal- current, Flu-current  Next Provider Visit: 9/27/2019  Next Spirometry/CPFT: 9/27/2019  Approximate time spent with patient: 30 minutes

## 2019-07-15 NOTE — PROGRESS NOTES
"Chief Complaint: Hematospermia    HPI:   7/15/19: PSA down on finasteride.  Voiding fine.  No hematuria.  Reviewed history in detail.   7/12/18: Hernandez out, stream is good.  Voiding fine.  Can't tell a difference from the flomax.  7/3/18: Finds that if he sits his urine is fine, if he walks too much he gets some hematuria.  Had retention after hernandez removed about 10d ago, had it replaced later that night.  Path benign.  6/19/18: TURP/urethral polyp resection  6/4/18: Ct Urogram reassuring only some small bilateral simple cysts.  Cysto shows a couple of small polyps int he prostatic urethra proximal to the veru, and just distal to the veru at 6 o'clock there is a different polypoid mass resembling Ta TCC.  5/7/18: A couple weeks ago he had a lot of red blood in the semen again.  No other symptoms at all.  Occasional sildanefil.  US mentions a new complex left renal cyst.  4/4/17: The burning symptoms are gone, the fresh blood has abated, and has had a change to some brown color to the semen still.  2/14/17: Two times last week had some hematospermia.  No gross hematuria.  No LUTS.  Has a burning sensation that just started.  Is coming off some Abx for URI (azithromycin/PCN).  Finds a burning sensation in the base of the penis, under scrotum at the end of voiding and then is better but still felt.  Uses a CPAP, nocturia x2-3.  Dr. Roblero checks prostate annually.  8/15: Melissa: "70-year-old male returns to the clinic today for follow-up on his BPH.  At his last visit, we had discussed that restricting his intake of caffeine had significantly helped his daytime symptoms, but he was still having trouble at night with having to get up to urinate.  We discussed monitoring his fluid intake in the evenings even more strictly, and he stated that he would prefer to do that rather than start on medication.  Since then, he has been able to restrict his evening fluid intake even more, and is doing better.  He is not interested in " "starting any medication at the present time. He is not having any other urinary symptoms or problems, and his urinalysis here in the clinic today is normal."    Allergies:  Lipitor [atorvastatin]; Niacin preparations; Iodinated contrast- oral and iv dye; Omeprazole; and Prilosec [omeprazole magnesium]    Medications:  has a current medication list which includes the following prescription(s): accu-chek lux plus meter, accu-chek lux plus test strp, albuterol-ipratropium, aspirin, azelastine, budesonide-formoterol 160-4.5 mcg, docusate sodium, finasteride, furosemide, glucosamine/chondr leach a sod, humalog kwikpen insulin, insulin, krill oil, lancets, levocetirizine, metoprolol succinate, milk thistle, montelukast, multivit,iron,mins/folic acid, pen needle, diabetic, polyethylene glycol, rabeprazole, sildenafil, sildenafil, suprep bowel prep kit, and synthroid, and the following Facility-Administered Medications: lactated ringers.    Review of Systems:  General: No fever, chills, fatigability, or weight loss.  Skin: No rashes, itching, or changes in color or texture of skin.  Chest: Denies MARCUS, cyanosis, wheezing, cough, and sputum production.  Abdomen: Appetite fine. No weight loss. Denies diarrhea, abdominal pain, hematemesis, or blood in stool.  Musculoskeletal: No joint stiffness or swelling. Denies back pain.  : As above.  All other review of systems negative.    PMH:   has a past medical history of Aortic stenosis, Asthma, BPH (benign prostatic hyperplasia), CAD (coronary artery disease), Cirrhosis, Claudication (4/9/2014), Colon polyp, COPD (chronic obstructive pulmonary disease), ED (erectile dysfunction), Encounter for blood transfusion, Ex-smoker, Hearing loss NEC, Hepatitis C, Hyperlipidemia, Hypertension, Hypothyroid, DELONTE on CPAP, Osteoarthritis, PVD (peripheral vascular disease), and Type 2 diabetes mellitus.    PSH:   has a past surgical history that includes Knee surgery (Right); Trigger finger " release (Left); Carpal tunnel release (Left); Elbow bursa surgery (Right, 2010); Rectal surgery (2012); Eye surgery; Cataract extraction w/ intraocular lens  implant, bilateral (2008); Cardiac catheterization; Colonoscopy (8/2013); Colonoscopy (N/A, 5/30/2016); Transurethral resection of prostate (TURP) without use of laser (N/A, 6/19/2018); Coronary Artery Bypass Graft (CABG) (N/A, 11/5/2018); Endoscopic harvest of vein (Left, 11/5/2018); Cardiac surgery; Catheterization of both left and right heart (N/A, 11/2/2018); Functional endoscopic sinus surgery (FESS) (Bilateral, 3/27/2019); Frontal sinus obliteration (Bilateral, 3/27/2019); Maxillary antrostomy (Bilateral, 3/27/2019); Ethmoidectomy (Bilateral, 3/27/2019); and Nasal septoplasty (N/A, 3/27/2019).    FamHx: family history includes Heart disease in his brother, father, and mother.    SocHx:  reports that he quit smoking about 47 years ago. He has a 2.00 pack-year smoking history. He quit smokeless tobacco use about 42 years ago. His smokeless tobacco use included chew. He reports that he drinks about 1.2 oz of alcohol per week. He reports that he does not use drugs.      Physical Exam:  Vitals:    07/15/19 1426   BP: 132/80     General: A&Ox3, no apparent distress, no deformities  Neck: No masses, normal thyroid  Lungs: normal inspiration, no use of accessory muscles  Heart: normal pulse, no arrhythmias  Abdomen: Soft, NT, ND  Skin: The skin is warm and dry. No jaundice.  Ext: No c/c/e.  :   2/17: Test desc elodia, no abnormalities of epididymus. Penis normal, with normal penile and scrotal skin. Meatus normal. Normal rectal tone, no hemorrhoids. Prost 20 gm no nodules or masses appreciated. SV not palpable. Perineum and anus normal.    Labs/Studies:   Urinalysis performed in clinic, summary: UA normal  PSA    6/18: 0.35    3/16: 0.4    7/19: 0.08    Impression/Plan:   1. Continue finasteride for long term improvement of likely prostate-related problems.

## 2019-07-16 ENCOUNTER — CONFERENCE (OUTPATIENT)
Dept: TRANSPLANT | Facility: CLINIC | Age: 74
End: 2019-07-16

## 2019-07-16 ENCOUNTER — OFFICE VISIT (OUTPATIENT)
Dept: INTERNAL MEDICINE | Facility: CLINIC | Age: 74
End: 2019-07-16
Payer: MEDICARE

## 2019-07-16 VITALS
SYSTOLIC BLOOD PRESSURE: 126 MMHG | BODY MASS INDEX: 36.66 KG/M2 | WEIGHT: 241.88 LBS | TEMPERATURE: 98 F | DIASTOLIC BLOOD PRESSURE: 72 MMHG | HEIGHT: 68 IN

## 2019-07-16 DIAGNOSIS — J45.909 UNCOMPLICATED ASTHMA, UNSPECIFIED ASTHMA SEVERITY, UNSPECIFIED WHETHER PERSISTENT: ICD-10-CM

## 2019-07-16 DIAGNOSIS — K74.60 CIRRHOSIS OF LIVER WITHOUT ASCITES, UNSPECIFIED HEPATIC CIRRHOSIS TYPE: ICD-10-CM

## 2019-07-16 DIAGNOSIS — I25.10 CORONARY ARTERY DISEASE INVOLVING NATIVE CORONARY ARTERY OF NATIVE HEART WITHOUT ANGINA PECTORIS: ICD-10-CM

## 2019-07-16 DIAGNOSIS — E11.9 TYPE 2 DIABETES MELLITUS WITHOUT COMPLICATION, WITHOUT LONG-TERM CURRENT USE OF INSULIN: Primary | ICD-10-CM

## 2019-07-16 DIAGNOSIS — E03.9 ACQUIRED HYPOTHYROIDISM: ICD-10-CM

## 2019-07-16 DIAGNOSIS — I10 ESSENTIAL HYPERTENSION: ICD-10-CM

## 2019-07-16 DIAGNOSIS — I77.89 OTHER SPECIFIED DISORDERS OF ARTERIES AND ARTERIOLES: ICD-10-CM

## 2019-07-16 PROCEDURE — 3078F DIAST BP <80 MM HG: CPT | Mod: CPTII,S$GLB,, | Performed by: FAMILY MEDICINE

## 2019-07-16 PROCEDURE — 3074F PR MOST RECENT SYSTOLIC BLOOD PRESSURE < 130 MM HG: ICD-10-PCS | Mod: CPTII,S$GLB,, | Performed by: FAMILY MEDICINE

## 2019-07-16 PROCEDURE — 3078F PR MOST RECENT DIASTOLIC BLOOD PRESSURE < 80 MM HG: ICD-10-PCS | Mod: CPTII,S$GLB,, | Performed by: FAMILY MEDICINE

## 2019-07-16 PROCEDURE — 99999 PR PBB SHADOW E&M-EST. PATIENT-LVL III: ICD-10-PCS | Mod: PBBFAC,,, | Performed by: FAMILY MEDICINE

## 2019-07-16 PROCEDURE — 3044F PR MOST RECENT HEMOGLOBIN A1C LEVEL <7.0%: ICD-10-PCS | Mod: CPTII,S$GLB,, | Performed by: FAMILY MEDICINE

## 2019-07-16 PROCEDURE — 3044F HG A1C LEVEL LT 7.0%: CPT | Mod: CPTII,S$GLB,, | Performed by: FAMILY MEDICINE

## 2019-07-16 PROCEDURE — 1101F PT FALLS ASSESS-DOCD LE1/YR: CPT | Mod: CPTII,S$GLB,, | Performed by: FAMILY MEDICINE

## 2019-07-16 PROCEDURE — 99214 PR OFFICE/OUTPT VISIT, EST, LEVL IV, 30-39 MIN: ICD-10-PCS | Mod: S$GLB,,, | Performed by: FAMILY MEDICINE

## 2019-07-16 PROCEDURE — 1101F PR PT FALLS ASSESS DOC 0-1 FALLS W/OUT INJ PAST YR: ICD-10-PCS | Mod: CPTII,S$GLB,, | Performed by: FAMILY MEDICINE

## 2019-07-16 PROCEDURE — 99999 PR PBB SHADOW E&M-EST. PATIENT-LVL III: CPT | Mod: PBBFAC,,, | Performed by: FAMILY MEDICINE

## 2019-07-16 PROCEDURE — 3074F SYST BP LT 130 MM HG: CPT | Mod: CPTII,S$GLB,, | Performed by: FAMILY MEDICINE

## 2019-07-16 PROCEDURE — 99214 OFFICE O/P EST MOD 30 MIN: CPT | Mod: S$GLB,,, | Performed by: FAMILY MEDICINE

## 2019-07-16 NOTE — H&P
Short Stay Endoscopy History and Physical    PCP - Cortes Roblero MD    Procedure - EGD and Colonoscopy  ASA - 3  Mallampati - per anesthesia  History of Anesthesia problems - no  Family history Anesthesia problems -  no     HPI:  This is a 74 y.o.male here for evaluation of :GERD; Esophageal Varices; PHG; Hx of Colon Polyps     Reflux - yes  Dysphagia - no  Abdominal pain - no  Diarrhea - no  Anemia - yes  GI bleeding - no  Nausea and vomiting-no  Early satiety-no  aversion to sight or smell of food-no    ROS:  Constitutional: No fevers, chills, No weight loss  ENT: No allergies  CV: No chest pain  Pulm: No cough, No shortness of breath  Ophtho: No vision changes  GI: see HPI  Derm: No rash  Heme: No lymphadenopathy, No bruising  MSK: No arthritis  : No dysuria, No hematuria  Endo: No hot or cold intolerance  Neuro: No syncope, No seizure  Psych: No anxiety, No depression    Medical History:  Past Medical History:   Diagnosis Date    Aortic stenosis     Asthma     BPH (benign prostatic hyperplasia)     CAD (coronary artery disease)     40% lesion 2002;violet    Cirrhosis     Claudication 4/9/2014    Colon polyp     COPD (chronic obstructive pulmonary disease)     ED (erectile dysfunction)     Encounter for blood transfusion     Ex-smoker     Hearing loss NEC     Hepatitis C     Cured;yuly brownoni 2015    Hyperlipidemia     Hypertension     Hypothyroid     s/p tx graves    DELONTE on CPAP     Osteoarthritis     PVD (peripheral vascular disease)     Type 2 diabetes mellitus        Surgical History:  Past Surgical History:   Procedure Laterality Date    CARDIAC CATHETERIZATION      CARDIAC SURGERY      CARPAL TUNNEL RELEASE Left     CATARACT EXTRACTION W/ INTRAOCULAR LENS  IMPLANT, BILATERAL  2008    CATHETERIZATION, HEART, BOTH LEFT AND RIGHT N/A 11/2/2018    Per[facility] CATH LAB    COLONOSCOPY  8/2013    COLONOSCOPY N/A 5/30/2016    Performed by  Anna Tomas [facility] at [facility] ENDO    COLONOSCOPY N/A 8/23/2013    Performed by Nikhil Watson MD at [facility] ENDO    CORONARY ARTERY BYPASS GRAFT (CABG) N/A 11/5/2018    Performed by Bear De Luna MD at [facility]    ECHOCARDIOGRAM,TRANSESOPHAGEAL N/A 11/5/2018    Performed by Bear De Luna [facility] at [facility] OR    ELBOW BURSA SURGERY Right 2010    ESOPHAGOGASTRODUODENOSCOPY (EGD) N/A 6/26/2017    Performed by Anna Tomas MD at [facility] ENDO    ESOPHAGOGASTRODUODENOSCOPY (EGD) N/A 5/30/2016    Performed by Anna [facility]omas MD at [facility] ENDO    ETHMOIDECTOMY Bilateral 3/27/2019    Performed by Alejandro Parkinson MD at [facility] OR    EYE SURGERY      FESS (FUNCTIONAL ENDOSCOPIC SINUS SURGERY) Bilateral 3/27/2019    Performed by [facility]andro Parkinson [facility] at [facility] OR    KNEE ARTHROSCOPY W/ MENISCAL REPAIR Left 06/2019    dr boykin    KNEE SURGERY Right     MAXILLARY ANTROSTOMY Bilateral 3/27/2019    Performed by [facility]andro Parkinson MD at [facility] OR    RECTAL SURGERY  2012    SEPTOPLASTY, NOSE N/A 3/27/2019    Performed by [facility]andro Parkinson MD at [facility] OR    SINUSOTOMY, FRONTAL SINUS, OBLITERATIVE Bilateral 3/27/2019    Performed by Alejandro Parkinson MD at [facility] OR    SURGICAL PROCUREMENT, VEIN, ENDOSCOPIC Left 11/5/2018    Performed by Bear De Luna MD at [facility] OR    TRIGGER FINGER RELEASE Left     TURP, WITHOUT USING LASER N/A 6/19/2018    Performed by Cooper Cordova IV, MD at [facility] OR       Family History:  Family History   Problem Relation Age of Onset    Heart disease Mother     Heart disease Father     Heart disease Brother     Colon cancer Neg Hx        Social History:  Social History     Socioeconomic History    Marital status:      Spouse name: YAEL    Number of children: 2    Years of education: Not on file    Highest education level: Not on file   Occupational History    Not on file   Social Needs    Financial resource strain: Not on file    Food insecurity:     Worry: Not on file     Inability: Not on file    Transportation needs:      Medical: Not on file     Non-medical: Not on file   Tobacco Use    Smoking status: Former Smoker     Packs/day: 0.50     Years: 4.00     Pack years: 2.00     Last attempt to quit: 1972     Years since quittin.1    Smokeless tobacco: Former User     Types: Chew     Quit date: 1976   Substance and Sexual Activity    Alcohol use: Yes     Alcohol/week: 1.2 oz     Types: 2 Cans of beer per week     Comment: daily  No alcohol prior to surgery    Drug use: No    Sexual activity: Yes     Partners: Female   Lifestyle    Physical activity:     Days per week: Not on file     Minutes per session: Not on file    Stress: Not on file   Relationships    Social connections:     Talks on phone: Not on file     Gets together: Not on file     Attends [employer] service: Not on file     Active member of club or organization: Not on file     Attends meetings of clubs or organizations: Not on file     Relationship status: Not on file   Other Topics Concern    Not on file   Social History Narrative    . Lives with spouse. 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        Allergies:   Review of patient's allergies indicates:   Allergen Reactions    Lipitor [atorvastatin] Other (See Comments)     Muscle aches and pains as well as fatigue.     Niacin preparations Other (See Comments)     Causes broken blood vessels and bruises    Iodinated contrast- oral and iv dye Hives     Tachycardia    Omeprazole Hives and Itching    Prilosec [omeprazole magnesium] Hives and Itching       Medications:   Current Facility-Administered Medications on File Prior to Encounter   Medication Dose Route Frequency Provider Last Rate Last Dose    lactated ringers infusion   Intravenous Continuous Omaira Theodore MD         Current Outpatient Medications on File Prior to Encounter   Medication Sig Dispense Refill    ACCU-CHEK GRACE PLUS METER [facility] AS DIRECTED  0    ACCU-CHEK GRACE PLUS  "TEST STRP Strp TEST THREE TIMES A  strip 11    albuterol-ipratropium (DUO-NEB) 2.5 mg-0.5 mg/3 mL nebulizer solution Take 3 mLs by nebulization 2 (two) times daily. Rescue 120 vial 5    aspirin (ECOTRIN) 81 MG EC tablet Take 1 tablet (81 mg total) by mouth once daily.  0    azelastine (ASTELIN) 137 mcg (0.1 %) nasal spray 2 sprays (274 mcg total) by Nasal route 2 (two) times daily. 30 mL 6    budesonide-formoterol 160-4.5 mcg (SYMBICORT) 160-4.5 mcg/actuation HFAA INHALE 2 PUFFS INTO THE LUNGS TWO TIMES A DAY 10.2 g 11    docusate sodium (COLACE) 100 MG capsule Take 1 capsule (100 mg total) by mouth 2 (two) times daily. 60 capsule 0    furosemide (LASIX) 40 MG tablet Take 1 tablet (40 mg) by mouth 2 times a day and 1 extra if needed for 14 days. 90 tablet 5    GLUCOSAMINE HCL/CHONDR ROSARIO A NA (OSTEO BI-FLEX ORAL) Take 1 tablet by mouth 2 (two) times daily.       HUMALOG KWIKPEN INSULIN 100 unit/mL pen INJECT 3-4 UNITS INTO THE SKIN THREE TIMES DAILY BEFORE MEALS. 3 mL 11    insulin glargine (LANTUS SOLOSTAR) 100 unit/mL (3 mL) InPn pen Inject 40 Units into the skin once daily. 5 Syringe 6    krill oil 500 mg Cap Take by mouth.      lancets (ACCU-CHEK FASTCLIX LANCET DRUM) Misc TEST TWO TIMES A  each 5    levocetirizine (XYZAL) 5 MG tablet Take 1 tablet (5 mg total) by mouth every evening. 30 tablet 11    metoprolol succinate (TOPROL-XL) 50 MG 24 hr tablet Take 1 tablet (50 mg total) by mouth 2 (two) times daily. 60 tablet 11    milk thistle 175 mg tablet Take 175 mg by mouth once daily.      montelukast (SINGULAIR) 10 mg tablet Take 1 tablet (10 mg total) by mouth once daily. 30 tablet 11    MV,CA,IRON,MIN/FA/PHYTOSTEROL (CENTRUM SPECIALIST HEART ORAL) Take 1 tablet by mouth once daily.       pen needle, diabetic (BD INSULIN PEN NEEDLE UF MINI) 31 gauge x 3/16" Ndle USE AS DIRECTED 100 each 11    polyethylene glycol (GLYCOLAX) 17 gram/dose powder Take 17 g ( 1 capful) by mouth once " daily. 527 g 0    RABEprazole (ACIPHEX) 20 mg tablet Take 20 mg by mouth once daily.      SUPREP BOWEL PREP KIT 17.5-3.13-1.6 gram SolR Use as directed 354 mL 0       Objective Findings:    Vital Signs:There were no vitals filed for this visit.        Physical Exam:  General Appearance: Well appearing in no acute distress  Eyes:    No scleral icterus  ENT: Neck supple, Lips, mucosa, and tongue normal; teeth and gums normal  Lungs: CTA bilaterally in anterior and posterior fields, no wheezes, no crackles.  Heart:  Regular rate, S1, S2 normal, no murmurs heard.  Abdomen: Soft, non tender, non distended with normal bowel sounds. No hepatosplenomegaly, ascites, or mass.  Extremities: No clubbing, cyanosis or edema  Skin: No rash    Labs:  Reviewed    Plan:EGD and Colonoscopy  I have explained the risks and benefits of endoscopy procedures to the patient including but not limited to bleeding, perforation, infection, and death. The patient wishes to proceed.

## 2019-07-17 ENCOUNTER — ANESTHESIA EVENT (OUTPATIENT)
Dept: ENDOSCOPY | Facility: HOSPITAL | Age: 74
End: 2019-07-17
Payer: MEDICARE

## 2019-07-17 ENCOUNTER — ANESTHESIA (OUTPATIENT)
Dept: ENDOSCOPY | Facility: HOSPITAL | Age: 74
End: 2019-07-17
Payer: MEDICARE

## 2019-07-17 ENCOUNTER — HOSPITAL ENCOUNTER (OUTPATIENT)
Facility: HOSPITAL | Age: 74
Discharge: HOME | End: 2019-07-17
Attending: INTERNAL MEDICINE | Admitting: INTERNAL MEDICINE
Payer: MEDICARE

## 2019-07-17 VITALS
BODY MASS INDEX: 36.22 KG/M2 | TEMPERATURE: 98 F | RESPIRATION RATE: 18 BRPM | DIASTOLIC BLOOD PRESSURE: 68 MMHG | HEIGHT: 68 IN | SYSTOLIC BLOOD PRESSURE: 109 MMHG | WEIGHT: 239 LBS | HEART RATE: 78 BPM | OXYGEN SATURATION: 96 %

## 2019-07-17 DIAGNOSIS — K29.30 CHRONIC SUPERFICIAL GASTRITIS WITHOUT BLEEDING: ICD-10-CM

## 2019-07-17 DIAGNOSIS — K21.9 GERD (GASTROESOPHAGEAL REFLUX DISEASE): ICD-10-CM

## 2019-07-17 DIAGNOSIS — Z86.010 HX OF COLONIC POLYPS: Primary | ICD-10-CM

## 2019-07-17 DIAGNOSIS — K21.9 GASTROESOPHAGEAL REFLUX DISEASE WITHOUT ESOPHAGITIS: ICD-10-CM

## 2019-07-17 DIAGNOSIS — D12.6 ADENOMATOUS POLYP OF COLON, UNSPECIFIED PART OF COLON: ICD-10-CM

## 2019-07-17 LAB — POCT GLUCOSE: 150 MG/DL (ref 70–110)

## 2019-07-17 PROCEDURE — 88305 TISSUE EXAM BY PATHOLOGIST: CPT | Mod: 26,,, | Performed by: PATHOLOGY

## 2019-07-17 PROCEDURE — 88305 TISSUE SPECIMEN TO PATHOLOGY - SURGERY: ICD-10-PCS | Mod: 26,,, | Performed by: PATHOLOGY

## 2019-07-17 PROCEDURE — 45380 COLONOSCOPY AND BIOPSY: CPT | Mod: 33,,, | Performed by: INTERNAL MEDICINE

## 2019-07-17 PROCEDURE — 37000009 HC ANESTHESIA EA ADD 15 MINS: Performed by: INTERNAL MEDICINE

## 2019-07-17 PROCEDURE — 43239 EGD BIOPSY SINGLE/MULTIPLE: CPT | Mod: 51,,, | Performed by: INTERNAL MEDICINE

## 2019-07-17 PROCEDURE — 43239 PR EGD, FLEX, W/BIOPSY, SGL/MULTI: ICD-10-PCS | Mod: 51,,, | Performed by: INTERNAL MEDICINE

## 2019-07-17 PROCEDURE — 43239 EGD BIOPSY SINGLE/MULTIPLE: CPT | Performed by: INTERNAL MEDICINE

## 2019-07-17 PROCEDURE — 88305 TISSUE EXAM BY PATHOLOGIST: CPT | Performed by: PATHOLOGY

## 2019-07-17 PROCEDURE — 27201012 HC FORCEPS, HOT/COLD, DISP: Performed by: INTERNAL MEDICINE

## 2019-07-17 PROCEDURE — 45380 PR COLONOSCOPY,BIOPSY: ICD-10-PCS | Mod: 33,,, | Performed by: INTERNAL MEDICINE

## 2019-07-17 PROCEDURE — 63600175 PHARM REV CODE 636 W HCPCS: Performed by: NURSE ANESTHETIST, CERTIFIED REGISTERED

## 2019-07-17 PROCEDURE — 45380 COLONOSCOPY AND BIOPSY: CPT | Performed by: INTERNAL MEDICINE

## 2019-07-17 PROCEDURE — 25000003 PHARM REV CODE 250: Performed by: INTERNAL MEDICINE

## 2019-07-17 PROCEDURE — 82962 GLUCOSE BLOOD TEST: CPT | Performed by: INTERNAL MEDICINE

## 2019-07-17 PROCEDURE — 37000008 HC ANESTHESIA 1ST 15 MINUTES: Performed by: INTERNAL MEDICINE

## 2019-07-17 RX ORDER — LIDOCAINE HCL/PF 100 MG/5ML
SYRINGE (ML) INTRAVENOUS
Status: DISCONTINUED | OUTPATIENT
Start: 2019-07-17 | End: 2019-07-17

## 2019-07-17 RX ORDER — PROPOFOL 10 MG/ML
INJECTION, EMULSION INTRAVENOUS
Status: DISCONTINUED | OUTPATIENT
Start: 2019-07-17 | End: 2019-07-17

## 2019-07-17 RX ORDER — SODIUM CHLORIDE 0.9 % (FLUSH) 0.9 %
10 SYRINGE (ML) INJECTION
Status: DISCONTINUED | OUTPATIENT
Start: 2019-07-17 | End: 2019-07-17 | Stop reason: HOSPADM

## 2019-07-17 RX ORDER — SODIUM CHLORIDE, SODIUM LACTATE, POTASSIUM CHLORIDE, CALCIUM CHLORIDE 600; 310; 30; 20 MG/100ML; MG/100ML; MG/100ML; MG/100ML
INJECTION, SOLUTION INTRAVENOUS CONTINUOUS
Status: DISCONTINUED | OUTPATIENT
Start: 2019-07-17 | End: 2019-07-17 | Stop reason: HOSPADM

## 2019-07-17 RX ADMIN — PROPOFOL 50 MG: 10 INJECTION, EMULSION INTRAVENOUS at 07:07

## 2019-07-17 RX ADMIN — LIDOCAINE HYDROCHLORIDE 50 MG: 20 INJECTION, SOLUTION INTRAVENOUS at 07:07

## 2019-07-17 RX ADMIN — PROPOFOL 100 MG: 10 INJECTION, EMULSION INTRAVENOUS at 07:07

## 2019-07-17 RX ADMIN — SODIUM CHLORIDE, SODIUM LACTATE, POTASSIUM CHLORIDE, AND CALCIUM CHLORIDE: 600; 310; 30; 20 INJECTION, SOLUTION INTRAVENOUS at 07:07

## 2019-07-17 RX ADMIN — SODIUM CHLORIDE, SODIUM LACTATE, POTASSIUM CHLORIDE, AND CALCIUM CHLORIDE: 600; 310; 30; 20 INJECTION, SOLUTION INTRAVENOUS at 06:07

## 2019-07-17 NOTE — TRANSFER OF CARE
"Anesthesia Transfer of Care Note    Patient: Erendira Pretty    Procedure(s) Performed: Procedure(s) (LRB):  ESOPHAGOGASTRODUODENOSCOPY (EGD) (N/A)  COLONOSCOPY (N/A)    Patient location: GI    Transport from OR: Transported from OR on room air with adequate spontaneous ventilation    Post pain: adequate analgesia    Post assessment: no apparent anesthetic complications and tolerated procedure well    Post vital signs: stable    Level of consciousness: awake, alert and oriented    Nausea/Vomiting: no nausea/vomiting    Complications: none    Transfer of care protocol was followed      Last vitals:   Visit Vitals  BP (!) 155/76 (BP Location: Left arm, Patient Position: Lying)   Pulse 86   Temp 36.6 °C (97.9 °F) (Oral)   Resp 18   Ht 5' 8" (1.727 m)   Wt 108.4 kg (238 lb 15.7 oz)   BMI 36.34 kg/m²     "

## 2019-07-17 NOTE — ANESTHESIA POSTPROCEDURE EVALUATION
Anesthesia Post Evaluation    Patient: Erendira Pretty    Procedure(s) Performed: Procedure(s) (LRB):  ESOPHAGOGASTRODUODENOSCOPY (EGD) (N/A)  COLONOSCOPY (N/A)    Final Anesthesia Type: MAC  Patient location during evaluation: PACU  Patient participation: Yes- Able to Participate  Level of consciousness: awake and alert and oriented  Post-procedure vital signs: reviewed and stable  Pain management: adequate  Airway patency: patent  PONV status at discharge: No PONV  Anesthetic complications: no      Cardiovascular status: stable  Respiratory status: unassisted, spontaneous ventilation and room air  Hydration status: euvolemic  Follow-up not needed.          Vitals Value Taken Time   /76 7/17/2019  6:28 AM   Temp 36.6 °C (97.9 °F) 7/17/2019  6:28 AM   Pulse 86 7/17/2019  6:28 AM   Resp 18 7/17/2019  6:28 AM   SpO2 96 % 7/15/2019  1:00 PM         No case tracking events are documented in the log.      Pain/Natalie Score: No data recorded

## 2019-07-17 NOTE — DISCHARGE INSTRUCTIONS
Understanding Colon and Rectal Polyps    The colon (also called the large intestine) is a muscular tube that forms the last part of the digestive tract. It absorbs water and stores food waste. The colon is about 4 to 6 feet long. The rectum is the last 6 inches of the colon. The colon and rectum have a smooth lining composed of millions of cells. Changes in these cells can lead to growths in the colon that can become cancerous and should be removed. Multiple tests are available to screen for colon cancer, but the colonoscopy is the most recommended test. During colonoscopy, these polyps can be removed. How often you need this test depends on many things including your condition, your family history, symptoms, and what the findings were at the previous colonoscopy.   When the colon lining changes  Changes that happen in the cells that line the colon or rectum can lead to growths called polyps. Over a period of years, polyps can turn cancerous. Removing polyps early may prevent cancer from ever forming.  Polyps  Polyps are fleshy clumps of tissue that form on the lining of the colon or rectum. Small polyps are usually benign (not cancerous). However, over time, cells in a polyp can change and become cancerous. Certain types of polyps known as adenomatous polyps are premalignant. The risk for invasive cancer increases with the size of the polyp and certain cell and gene features. This means that they can become cancerous if they're not removed. Hyperplastic polyps are benign. They can grow quite large and not turn cancerous.   Cancer  Almost all colorectal cancers start when polyp cells begin growing abnormally. As a cancerous tumor grows, it may involve more and more of the colon or rectum. In time, cancer can also grow beyond the colon or rectum and spread to nearby organs or to glands called lymph nodes. The cells can also travel to other parts of the body. This is known as metastasis. The earlier a cancerous  tumor is removed, the better the chance of preventing its spread.    Date Last Reviewed: 8/1/2016  © 8839-1052 [employer]. [address] [place] [address], [place] [address]. All rights reserved. This information is not intended as a substitute for professional medical care. Always follow your healthcare professional's instructions.        Gastritis (Adult)    Gastritis is inflammation and irritation of the stomach lining. It can be present for a short time (acute) or be long lasting (chronic). Gastritis is often caused by infection with bacteria called H pylori. More than a third of people in the [place] have this bacteria in their bodies. In many cases, H pylori causes no problems or symptoms. In some people, though, the infection irritates the stomach lining and causes gastritis. Other causes of stomach irritation include drinking alcohol or taking pain-relieving medicines called NSAIDs (such as aspirin or ibuprofen).   Symptoms of gastritis can include:  · Abdominal pain or bloating  · Loss of appetite  · Nausea or vomiting  · Vomiting blood or having black stools  · Feeling more tired than usual  An inflamed and irritated stomach lining is more likely to develop a sore called an ulcer. To help prevent this, gastritis should be treated.  Home care  If needed, medicines may be prescribed. If you have H pylori infection, treating it will likely relieve your symptoms. Other changes can help reduce stomach irritation and help it heal.  · If you have been prescribed medicines for H pylori infection, take them as directed. Take all of the medicine until it is finished or your healthcare provider tells you to stop, even if you feel better.  · Your healthcare provider may recommend avoiding NSAIDs. If you take daily aspirin for your heart or other medical reasons, do not stop without talking to your healthcare provider first.  · Avoid drinking alcohol.  · Stop smoking. Smoking can irritate the stomach and delay  healing. As much as possible, stay away from second hand smoke.  Follow-up care  Follow up with your healthcare provider, or as advised by our staff. Testing may be needed to check for inflammation or an ulcer.  When to seek medical advice  Call your healthcare provider for any of the following:  · Stomach pain that gets worse or moves to the lower right abdomen (appendix area)  · Chest pain that appears or gets worse, or spreads to the back, neck, shoulder, or arm  · Frequent vomiting (cant keep down liquids)  · Blood in the stool or vomit (red or black in color)  · Feeling weak or dizzy  · Fever of 100.4ºF (38ºC) or higher, or as directed by your healthcare provider  Date Last Reviewed: 6/22/2015 © 2000-2017 [employer]. [address] [place] [address], [place] [address]. All rights reserved. This information is not intended as a substitute for professional medical care. Always follow your healthcare professional's instructions.        Upper GI Endoscopy     During endoscopy, a long, flexible tube is used to view the inside of your upper GI tract.      Upper GI endoscopy allows your healthcare provider to look directly into the beginning of your gastrointestinal (GI) tract. The esophagus, stomach, and duodenum (the first part of the small intestine) make up the upper GI tract.   Before the exam  Follow these and any other instructions you are given before your endoscopy. If you dont follow the healthcare providers instructions carefully, the test may need to be canceled or done over:  · Don't eat or drink anything after midnight the night before your exam. If your exam is in the afternoon, drink only clear liquids in the morning. Don't eat or drink anything for 8 hours before the exam. In some cases, you may be able to take medicines with sips of water until 2 hours before the procedure. Speak with your healthcare provider about this.   · Bring your X-rays and any other test results you have.  · Because  you will be sedated, arrange for an adult to drive you home after the exam.  · Tell your healthcare provider before the exam if you are taking any medicines or have any medical problems.  The procedure  Here is what to expect:  · You will lie on the endoscopy table. Usually patients lie on the left side.  · You will be monitored and given oxygen.  · Your throat may be numbed with a spray or gargle. You are given medicine through an intravenous (IV) line that will help you relax and remain comfortable. You may be awake or asleep during the procedure.  · The healthcare provider will put the endoscope in your mouth and down your esophagus. It is thinner than most pieces of food that you swallow. It will not affect your breathing. The medicine helps keep you from gagging.  · Air is put into your GI tract to expand it. It can make you burp.  · During the procedure, the healthcare provider can take biopsies (tissue samples), remove abnormalities, such as polyps, or treat abnormalities through a variety of devices placed through the endoscope. You will not feel this.   · The endoscope carries images of your upper GI tract to a video screen. If you are awake, you may be able to look at the images.  · After the procedure is done, you will rest for a time. An adult must drive you home.  When to call your healthcare provider  Contact your healthcare provider if you have:  · Black or tarry stools, or blood in your stool  · Fever  · Pain in your belly that does not go away  · Nausea and vomiting, or vomiting blood   Date Last Reviewed: 7/1/2016  © 1793-4173 [employer]. [address] [place] [address], [place] [address]. All rights reserved. This information is not intended as a substitute for professional medical care. Always follow your healthcare professional's instructions.

## 2019-07-17 NOTE — PROVATION PATIENT INSTRUCTIONS
Discharge Summary/Instructions after an Endoscopic Procedure  Patient Name: Erendira Pretty  Patient MRN: [id number]  Patient YOB: 1945 Wednesday, July 17, 2019 David Carter III, MD  RESTRICTIONS:  During your procedure today, you received medications for sedation.  These   medications may affect your judgment, balance and coordination.  Therefore,   for 24 hours, you have the following restrictions:   - DO NOT drive a car, operate machinery, make legal/financial decisions,   sign important papers or drink alcohol.    ACTIVITY:  Today: no heavy lifting, straining or running due to procedural   sedation/anesthesia.  The following day: return to full activity including work.  DIET:  Eat and drink normally unless instructed otherwise.     TREATMENT FOR COMMON SIDE EFFECTS:  - Mild abdominal pain, nausea, belching, bloating or excessive gas:  rest,   eat lightly and use a heating pad.  - Sore Throat: treat with throat lozenges and/or gargle with warm salt   water.  - Because air was used during the procedure, expelling large amounts of air   from your rectum or belching is normal.  - If a bowel prep was taken, you may not have a bowel movement for 1-3 days.    This is normal.  SYMPTOMS TO WATCH FOR AND REPORT TO YOUR PHYSICIAN:  1. Abdominal pain or bloating, other than gas cramps.  2. Chest pain.  3. Back pain.  4. Signs of infection such as: chills or fever occurring within 24 hours   after the procedure.  5. Rectal bleeding, which would show as bright red, maroon, or black stools.   (A tablespoon of blood from the rectum is not serious, especially if   hemorrhoids are present.)  6. Vomiting.  7. Weakness or dizziness.  GO DIRECTLY TO THE NEAREST EMERGENCY ROOM IF YOU HAVE ANY OF THE FOLLOWING:      Difficulty breathing              Chills and/or fever over 101 F   Persistent vomiting and/or vomiting blood   Severe abdominal pain   Severe chest pain   Black, tarry stools   Bleeding- more than one  tablespoon   Any other symptom or condition that you feel may need urgent attention  Your doctor recommends these additional instructions:  If any biopsies were taken, your doctors clinic will contact you in 1 to 2   weeks with any results.  - Discharge patient to home (via wheelchair).   - Resume previous diet.   - Continue present medications.   - Await pathology results.   - Return to GI clinic as previously scheduled.  For questions, problems or results please call your physician David Carter III, MD at Work:  [phone]  If you have any questions about the above instructions, call the GI   department at [phone] or call the endoscopy unit at [phone]   from 7am until 3 pm.  [facility], EMERGENCY ROOM PHONE NUMBER:   [phone]  IF A COMPLICATION OR EMERGENCY SITUATION ARISES AND YOU ARE UNABLE TO REACH   YOUR PHYSICIAN - GO DIRECTLY TO THE EMERGENCY ROOM.  I have read or have had read to me these discharge instructions for my   procedure and have received a written copy.  I understand these   instructions and will follow-up with my physician if I have any questions.     __________________________________       _____________________________________  Nurse Signature                                          Patient/Designated   Responsible Party Signature  David Carter III, MD  7/17/2019 8:03:39 AM  This report has been verified and signed electronically.  [employer]

## 2019-07-17 NOTE — DISCHARGE SUMMARY
[facility]  Brief Operative Note     SUMMARY     Surgery Date: 7/17/2019     Surgeon(s) and Role:     * David Carter III, MD - Primary    Assisting Surgeon: None    Pre-op Diagnosis:  Portal hypertension with esophageal varices [K76.6, I85.00]  History of colon polyps [Z86.010]  Gastroesophageal reflux disease with esophagitis [K21.0]    Post-op Diagnosis:  Post-Op Diagnosis Codes:     * Portal hypertension with esophageal varices [K76.6, I85.00]     * History of colon polyps [Z86.010]     * Gastroesophageal reflux disease with esophagitis [K21.0]      - Gastritis      - Colon Polyps  Procedure(s) (LRB):  ESOPHAGOGASTRODUODENOSCOPY (EGD) (N/A)  COLONOSCOPY (N/A)    Anesthesia: Choice    Description of the findings of the procedure: Procedures completed. See Procedure note for full details.    Findings/Key Components: Procedures completed. See Procedure note for full details.    Prosthetics/Devices: None    Estimated Blood Loss: * No values recorded between 7/17/2019 12:00 AM and 7/17/2019  8:08 AM *         Specimens:   Specimen (12h ago, onward)    Start     Ordered    07/17/19 0720  Specimen to Pathology - Surgery  Once     Comments:  1. Antrum biopsies gastritis r/o H. Pylori2. Descending Colon Polyp3. Transverse Colon Polyp     Start Status     07/17/19 0720 Collected (07/17/19 0737) Order ID: [id number]       07/17/19 0736          Discharge Note    SUMMARY     Admit Date: 7/17/2019    Discharge Date and Time: 7/17/2019    Hospital Course (synopsis of major diagnoses, care, treatment, and services provided during the course of the hospital stay):  Procedures completed. See Procedure note for full details. Discharge patient when discharge criteria met.    Final Diagnosis: Post-Op Diagnosis Codes:     * Portal hypertension with esophageal varices [K76.6, I85.00]     * History of colon polyps [Z86.010]     * Gastroesophageal reflux disease with esophagitis [K21.0]       - Gastritis       -  Colon Polyps  Disposition: Discharge patient when discharge criteria met.    Follow Up/Patient Instructions:       Medications:  Reconciled Home Medications:   Current Discharge Medication List      CONTINUE these medications which have NOT CHANGED    Details   ACCU-CHEK GRACE PLUS METER Misc AS DIRECTED  Refills: 0      ACCU-CHEK GRACE PLUS TEST STRP Strp TEST THREE TIMES A DAY  Qty: 100 strip, Refills: 11      azelastine (ASTELIN) 137 mcg (0.1 %) nasal spray 2 sprays (274 mcg total) by Nasal route 2 (two) times daily.  Qty: 30 mL, Refills: 6      docusate sodium (COLACE) 100 MG capsule Take 1 capsule (100 mg total) by mouth 2 (two) times daily.  Qty: 60 capsule, Refills: 0      finasteride (PROSCAR) 5 mg tablet Take 1 tablet (5 mg total) by mouth once daily.  Qty: 30 tablet, Refills: 11      furosemide (LASIX) 40 MG tablet Take 1 tablet (40 mg) by mouth 2 times a day and 1 extra if needed for 14 days.  Qty: 90 tablet, Refills: 5      GLUCOSAMINE HCL/CHONDR ROSARIO A NA (OSTEO BI-FLEX ORAL) Take 1 tablet by mouth 2 (two) times daily.       HUMALOG KWIKPEN INSULIN 100 unit/mL pen INJECT 3-4 UNITS INTO THE SKIN THREE TIMES DAILY BEFORE MEALS.  Qty: 3 mL, Refills: 11      insulin glargine (LANTUS SOLOSTAR) 100 unit/mL (3 mL) InPn pen Inject 40 Units into the skin once daily.  Qty: 5 Syringe, Refills: 6      krill oil 500 mg Cap Take by mouth.      lancets (ACCU-CHEK FASTCLIX LANCET DRUM) Misc TEST TWO TIMES A DAY  Qty: 102 each, Refills: 5      levocetirizine (XYZAL) 5 MG tablet Take 1 tablet (5 mg total) by mouth every evening.  Qty: 30 tablet, Refills: 11      metoprolol succinate (TOPROL-XL) 50 MG 24 hr tablet Take 1 tablet (50 mg total) by mouth 2 (two) times daily.  Qty: 60 tablet, Refills: 11      milk thistle 175 mg tablet Take 175 mg by mouth once daily.      montelukast (SINGULAIR) 10 mg tablet Take 1 tablet (10 mg total) by mouth once daily.  Qty: 30 tablet, Refills: 11    Associated Diagnoses: Asthma with COPD  "     MV,CA,IRON,MIN/FA/PHYTOSTEROL (CENTRUM SPECIALIST HEART ORAL) Take 1 tablet by mouth once daily.       pen needle, diabetic (BD INSULIN PEN NEEDLE UF MINI) 31 gauge x 3/16" Ndle USE AS DIRECTED  Qty: 100 each, Refills: 11      RABEprazole (ACIPHEX) 20 mg tablet Take 20 mg by mouth once daily.      !! sildenafil (REVATIO) 20 mg Tab Take 2 tables by mouth one hour prior to intercourse.  Max 2 per 24 hours  Qty: 30 tablet, Refills: 3      !! sildenafil (REVATIO) 20 mg Tab Take 2 tablets one hour prior to intercourse. Max two per 24 hours  Qty: 90 tablet, Refills: 11      SYNTHROID 137 mcg Tab tablet TAKE 2 TABLETS BY MOUTH BEFORE BREAKFAST.  Qty: 60 tablet, Refills: 5      albuterol-ipratropium (DUO-NEB) 2.5 mg-0.5 mg/3 mL nebulizer solution Take 3 mLs by nebulization 2 (two) times daily. Rescue  Qty: 120 vial, Refills: 5    Associated Diagnoses: Chronic wheezy bronchitis; Asthma with COPD      aspirin (ECOTRIN) 81 MG EC tablet Take 1 tablet (81 mg total) by mouth once daily.  Refills: 0      budesonide-formoterol 160-4.5 mcg (SYMBICORT) 160-4.5 mcg/actuation HFAA INHALE 2 PUFFS INTO THE LUNGS TWO TIMES A DAY  Qty: 10.2 g, Refills: 11    Associated Diagnoses: Chronic wheezy bronchitis      polyethylene glycol (GLYCOLAX) 17 gram/dose powder Take 17 g ( 1 capful) by mouth once daily.  Qty: 527 g, Refills: 0       !! - Potential duplicate medications found. Please discuss with provider.      STOP taking these medications       SUPREP BOWEL PREP KIT 17.5-3.13-1.6 gram SolR Comments:   Reason for Stopping:              Discharge Procedure Orders   Diet general     Activity as tolerated     "

## 2019-07-17 NOTE — ANESTHESIA PREPROCEDURE EVALUATION
07/17/2019  Erendira Pretty is a 74 y.o., male.    Anesthesia Evaluation    I have reviewed the Patient Summary Reports.    I have reviewed the Nursing Notes.   I have reviewed the Medications.     Review of Systems  Anesthesia Hx:  Denies Family Hx of Anesthesia complications.   Denies Personal Hx of Anesthesia complications.   Cardiovascular:   Hypertension CAD   Angina  Coronary Artery Disease:  Hypertension    Pulmonary:   COPD Asthma Sleep Apnea  Asthma:  Chronic Obstructive Pulmonary Disease (COPD):  Obstructive Sleep Apnea (DELONTE).   Hepatic/GI:   Liver Disease, Hepatitis, C  Liver Disease, Hepatitis, chronic    Endocrine:   Diabetes Hypothyroidism  Diabetes        Physical Exam   Airway/Jaw/Neck:  Airway Findings: Mallampati: III                Anesthesia Plan  Type of Anesthesia, risks & benefits discussed:  Anesthesia Type:  MAC  Patient's Preference:   Intra-op Monitoring Plan:   Intra-op Monitoring Plan Comments:   Post Op Pain Control Plan:   Post Op Pain Control Plan Comments:   Induction:   IV  Beta Blocker:  Patient is not currently on a Beta-Blocker (No further documentation required).       Informed Consent: Patient understands risks and agrees with Anesthesia plan.  Questions answered. Anesthesia consent signed with patient.  ASA Score: 3     Day of Surgery Review of History & Physical: I have interviewed and examined the patient. I have reviewed the patient's H&P dated:  There are no significant changes. Significant changes noted: Surgeon notified.  H&P update referred to the surgeon.         Ready For Surgery From Anesthesia Perspective.

## 2019-07-17 NOTE — PROVATION PATIENT INSTRUCTIONS
Discharge Summary/Instructions after an Endoscopic Procedure  Patient Name: Erendira Pretty  Patient MRN: [id number]  Patient YOB: 1945 Wednesday, July 17, 2019 David Carter III, MD  RESTRICTIONS:  During your procedure today, you received medications for sedation.  These   medications may affect your judgment, balance and coordination.  Therefore,   for 24 hours, you have the following restrictions:   - DO NOT drive a car, operate machinery, make legal/financial decisions,   sign important papers or drink alcohol.    ACTIVITY:  Today: no heavy lifting, straining or running due to procedural   sedation/anesthesia.  The following day: return to full activity including work.  DIET:  Eat and drink normally unless instructed otherwise.     TREATMENT FOR COMMON SIDE EFFECTS:  - Mild abdominal pain, nausea, belching, bloating or excessive gas:  rest,   eat lightly and use a heating pad.  - Sore Throat: treat with throat lozenges and/or gargle with warm salt   water.  - Because air was used during the procedure, expelling large amounts of air   from your rectum or belching is normal.  - If a bowel prep was taken, you may not have a bowel movement for 1-3 days.    This is normal.  SYMPTOMS TO WATCH FOR AND REPORT TO YOUR PHYSICIAN:  1. Abdominal pain or bloating, other than gas cramps.  2. Chest pain.  3. Back pain.  4. Signs of infection such as: chills or fever occurring within 24 hours   after the procedure.  5. Rectal bleeding, which would show as bright red, maroon, or black stools.   (A tablespoon of blood from the rectum is not serious, especially if   hemorrhoids are present.)  6. Vomiting.  7. Weakness or dizziness.  GO DIRECTLY TO THE NEAREST EMERGENCY ROOM IF YOU HAVE ANY OF THE FOLLOWING:      Difficulty breathing              Chills and/or fever over 101 F   Persistent vomiting and/or vomiting blood   Severe abdominal pain   Severe chest pain   Black, tarry stools   Bleeding- more than one  tablespoon   Any other symptom or condition that you feel may need urgent attention  Your doctor recommends these additional instructions:  If any biopsies were taken, your doctors clinic will contact you in 1 to 2   weeks with any results.  - Discharge patient to home (via wheelchair).   - High fiber diet.   - Continue present medications.   - Await pathology results.   - Repeat colonoscopy in 5 years for surveillance.   - Return to primary care physician as previously scheduled.   - Discharge patient to home (via wheelchair).   - High fiber diet.   - Continue present medications.   - Await pathology results.   - Repeat colonoscopy in 5 years for surveillance.   - Return to primary care physician as previously scheduled.  For questions, problems or results please call your physician David Carter III, MD at Work:  [phone]  If you have any questions about the above instructions, call the GI   department at [phone] or call the endoscopy unit at [phone]   from 7am until 3 pm.  [facility], EMERGENCY ROOM PHONE NUMBER:   [phone]  IF A COMPLICATION OR EMERGENCY SITUATION ARISES AND YOU ARE UNABLE TO REACH   YOUR PHYSICIAN - GO DIRECTLY TO THE EMERGENCY ROOM.  I have read or have had read to me these discharge instructions for my   procedure and have received a written copy.  I understand these   instructions and will follow-up with my physician if I have any questions.     __________________________________       _____________________________________  Nurse Signature                                          Patient/Designated   Responsible Party Signature  David Carter III, MD  7/17/2019 7:57:10 AM  This report has been verified and signed electronically.  [employer]

## 2019-07-17 NOTE — ANESTHESIA RELEASE NOTE
"Anesthesia Release from PACU Note    Patient: Erendira Pretty    Procedure(s) Performed: Procedure(s) (LRB):  ESOPHAGOGASTRODUODENOSCOPY (EGD) (N/A)  COLONOSCOPY (N/A)    Anesthesia type: MAC    Post pain: Adequate analgesia    Post assessment: no apparent anesthetic complications    Last Vitals:   Visit Vitals  BP (!) 155/76 (BP Location: Left arm, Patient Position: Lying)   Pulse 86   Temp 36.6 °C (97.9 °F) (Oral)   Resp 18   Ht 5' 8" (1.727 m)   Wt 108.4 kg (238 lb 15.7 oz)   BMI 36.34 kg/m²       Post vital signs: stable    Level of consciousness: awake    Nausea/Vomiting: no nausea/no vomiting    Complications: none    Airway Patency: patent    Respiratory: unassisted    Cardiovascular: stable and blood pressure at baseline    Hydration: euvolemic  "

## 2019-07-17 NOTE — PLAN OF CARE
Dr Carter came to bedside and discussed findings. NO N/V,  no abdominal pain, no GI bleeding, and vitals stable.  Pt discharged from unit.

## 2019-07-26 ENCOUNTER — TELEPHONE (OUTPATIENT)
Dept: GASTROENTEROLOGY | Facility: CLINIC | Age: 74
End: 2019-07-26

## 2019-07-26 ENCOUNTER — TELEPHONE (OUTPATIENT)
Dept: OTOLARYNGOLOGY | Facility: CLINIC | Age: 74
End: 2019-07-26

## 2019-07-26 NOTE — TELEPHONE ENCOUNTER
Please review pts MRI and advise of recommendations.    Notified pt that his case was being reviewed. Pt verbalized understanding but states it has been a few weeks and wants to know what is going on.

## 2019-07-26 NOTE — TELEPHONE ENCOUNTER
----- Message from Catherine Mathur sent at 7/26/2019  8:18 AM CDT -----  Contact: Pt  Pt is requesting call back from nurse in regards to results from MRI.      Pls call pt back at [phone]

## 2019-07-26 NOTE — TELEPHONE ENCOUNTER
Made pt an appointment to see Dr Parkinson next Tuesday.        ----- Message from Catherine Mathur sent at 7/26/2019  8:15 AM CDT -----  Contact: Pt  Pt is requesting call back in regards to sore throat and dizziness. Pt would like same day appt.        Pls call pt back at [phone]

## 2019-07-29 ENCOUNTER — TELEPHONE (OUTPATIENT)
Dept: INTERNAL MEDICINE | Facility: CLINIC | Age: 74
End: 2019-07-29

## 2019-07-29 NOTE — PROGRESS NOTES
Subjective:       Patient ID: Erendira Pretty is a male.    Chief Complaint: Multiple issues see below    HPI type 2 dm controlled;  hep c cure s/p txharvoni ;cirrhosis;utd dr gibbs  New liver lesion has f/u gi  htn  katie med   hypoth: tsh nl   Coronary artery disease:/pvd/aortic stenosis: up to date with cardiology. No chest pain, palpitations or shortness of breath. Tolerating medication.    Asthma: no c/o;utd pulm  DIPAK asympt utd     Mild wt loss. No abd pain nl bms. Nl appet. No diff eating      Past Medical History:   Diagnosis Date    Aortic stenosis     Asthma     BPH (benign prostatic hyperplasia)     CAD (coronary artery disease)     40% lesion 2002;lazo    Cirrhosis     Claudication 4/9/2014    Colon polyp     COPD (chronic obstructive pulmonary disease)     ED (erectile dysfunction)     Encounter for blood transfusion     Ex-smoker     Hearing loss NEC     Hepatitis C     Cured;dr gibbs, harvoni 2015    Hyperlipidemia     Hypertension     Hypothyroid     s/p tx graves    DELONTE on CPAP     Osteoarthritis     PVD (peripheral vascular disease)     Type 2 diabetes mellitus      Past Surgical History:   Procedure Laterality Date    CARDIAC CATHETERIZATION      CARDIAC SURGERY      CARPAL TUNNEL RELEASE Left     CATARACT EXTRACTION W/ INTRAOCULAR LENS  IMPLANT, BILATERAL  2008    CATHETERIZATION, HEART, BOTH LEFT AND RIGHT N/A 11/2/2018    Performed by Frank Lazo MD at [facility] CATH LAB    COLONOSCOPY  8/2013    COLONOSCOPY N/A 7/17/2019    Performed by David Carter III, MD at [facility] ENDO    COLONOSCOPY N/A 5/30/2016    Performed by Anna Gibbs MD at [facility] ENDO    COLONOSCOPY N/A 8/23/2013    Performed by Nikhil Watson MD at [facility] ENDO    CORONARY ARTERY BYPASS GRAFT (CABG) N/A 11/5/2018    Performed by Bear De Luna MD at [facility] OR    ECHOCARDIOGRAM,TRANSESOPHAGEAL N/A 11/5/2018    Performed by Bear De Luna MD at [facility] OR    ELBOW BURSA SURGERY Right 2010     ESOPHAGOGASTRODUODENOSCOPY (EGD) N/A 2019    Performed by David [facility] III, [facility] at [facility] ENDO    ESOPHAGOGASTRODUODENOSCOPY (EGD) N/A 2017    Performed by Anna Tomas MD at [facility] ENDO    ESOPHAGOGASTRODUODENOSCOPY (EGD) N/A 2016    Performed by Anna Tomas MD at [facility] ENDO    ETHMOIDECTOMY Bilateral 3/27/2019    [facility] by [facility]andro Parkinson [facility] at [facility] OR    EYE SURGERY      FESS (FUNCTIONAL ENDOSCOPIC SINUS SURGERY) Bilateral 3/27/2019    [facility] at [facility] OR    KNEE ARTHROSCOPY W/ MENISCAL REPAIR Left 2019    dr boykin    KNEE SURGERY Right     MAXILLARY ANTROSTOMY Bilateral 3/27/2019    [facility] Parkinson [facility] at [facility] OR    RECTAL SURGERY  2012    SEPTOPLASTY, NOSE N/A 3/27/2019    Per[facility] at [facility] OR    SINUSOTOMY, FRONTAL SINUS, OBLITERATIVE Bilateral 3/27/2019    Performed by [facility] Parkinson MD at [facility] OR    SURGICAL PROCUREMENT, VEIN, ENDOSCOPIC Left 2018    [facility] Bear [facility] at [facility] OR    TRIGGER FINGER RELEASE Left     TURP, WITHOUT USING LASER N/A 2018    Performed by Cooper Cordova IV, MD at [facility] OR     Family History   Problem Relation Age of Onset    Heart disease Mother     Heart disease Father     Heart disease Brother     Colon cancer Neg Hx      Social History     Socioeconomic History    Marital status:      Spouse name: YAEL    Number of children: 2    Years of education: Not on file    Highest education level: Not on file   Occupational History    Not on file   Social Needs    Financial resource strain: Not on file    Food insecurity:     Worry: Not on file     Inability: Not on file    Transportation needs:     Medical: Not on file     Non-medical: Not on file   Tobacco Use    Smoking status: Former Smoker     Packs/day: 0.50     Years: 4.00     Pack years: 2.00     Last attempt to quit: 1972     Years since quittin.1    Smokeless tobacco: Former User     Types: Chew     Quit  date: 11/18/1976   Substance and Sexual Activity    Alcohol use: Yes     Alcohol/week: 1.2 oz     Types: 2 Cans of beer per week     Comment: daily  No alcohol prior to surgery    Drug use: No    Sexual activity: Yes     Partners: Female   Lifestyle    Physical activity:     Days per week: Not on file     Minutes per session: Not on file    Stress: Not on file   Relationships    Social connections:     Talks on phone: Not on file     Gets together: Not on file     Attends Caodaism service: Not on file     Active member of club or organization: Not on file     Attends meetings of clubs or organizations: Not on file     Relationship status: Not on file   Other Topics Concern    Not on file   Social History Narrative    . Lives with spouse. 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      Review of Systems  Cardiovascular: no chest pain  Chest: no shortness of breath  Abd: no abd pain  Remainder review of systems negative    Objective:    gen nad  cvrrr  Chest ctablat  Abd+bs softntnd no hsm no mass     Ext no edema    Skin no rash  M/s no jt swelling or redness      Assessment:     type 2 dm    htn  cirrhosis  Cad  bph  Asthma  pvd  AS  Liver lesion (hx hep c)  Mild wt loss    Plan:    f/u card when due   F/u gi  Car u/s can be same day as 6 month lab or visit      Type 2 diabetes mellitus without complication, without long-term current use of insulin  -     Hemoglobin A1c; Future; Expected date: 01/12/2020  -     Microalbumin/creatinine urine ratio; Future; Expected date: 01/12/2020  -     Lipid panel; Future; Expected date: 01/12/2020  -     TSH; Future; Expected date: 01/12/2020    Coronary artery disease involving native coronary artery of native heart without angina pectoris    Cirrhosis of liver without ascites, unspecified hepatic cirrhosis type    Acquired hypothyroidism    Essential hypertension    Uncomplicated asthma, unspecified asthma severity, unspecified  whether persistent    Other specified disorders of arteries and arterioles  -     CAR Ultrasound doppler carotid bilateral; Future; Expected date: 01/12/2020    nurse wt check later in aug same day/place card visit     Has pulm, card , hemat, urol f/u

## 2019-07-29 NOTE — TELEPHONE ENCOUNTER
----- Message from Cortes Roblero MD sent at 7/28/2019 11:32 PM CDT -----  nursewt check later in aug same day/place card visit

## 2019-07-30 ENCOUNTER — OFFICE VISIT (OUTPATIENT)
Dept: OTOLARYNGOLOGY | Facility: CLINIC | Age: 74
End: 2019-07-30
Payer: MEDICARE

## 2019-07-30 VITALS
BODY MASS INDEX: 37.58 KG/M2 | WEIGHT: 247.13 LBS | HEART RATE: 76 BPM | SYSTOLIC BLOOD PRESSURE: 145 MMHG | DIASTOLIC BLOOD PRESSURE: 89 MMHG

## 2019-07-30 DIAGNOSIS — K76.9 LIVER LESION: Primary | ICD-10-CM

## 2019-07-30 DIAGNOSIS — J32.4 CHRONIC PANSINUSITIS: Primary | ICD-10-CM

## 2019-07-30 DIAGNOSIS — H81.11 BPPV (BENIGN PAROXYSMAL POSITIONAL VERTIGO), RIGHT: ICD-10-CM

## 2019-07-30 PROCEDURE — 3077F SYST BP >= 140 MM HG: CPT | Mod: CPTII,S$GLB,, | Performed by: OTOLARYNGOLOGY

## 2019-07-30 PROCEDURE — 99213 PR OFFICE/OUTPT VISIT, EST, LEVL III, 20-29 MIN: ICD-10-PCS | Mod: 25,S$GLB,, | Performed by: OTOLARYNGOLOGY

## 2019-07-30 PROCEDURE — 99999 PR PBB SHADOW E&M-EST. PATIENT-LVL IV: ICD-10-PCS | Mod: PBBFAC,,, | Performed by: OTOLARYNGOLOGY

## 2019-07-30 PROCEDURE — 99999 PR PBB SHADOW E&M-EST. PATIENT-LVL IV: CPT | Mod: PBBFAC,,, | Performed by: OTOLARYNGOLOGY

## 2019-07-30 PROCEDURE — 31231 PR NASAL ENDOSCOPY, DX: ICD-10-PCS | Mod: S$GLB,,, | Performed by: OTOLARYNGOLOGY

## 2019-07-30 PROCEDURE — 87070 CULTURE OTHR SPECIMN AEROBIC: CPT

## 2019-07-30 PROCEDURE — 3079F DIAST BP 80-89 MM HG: CPT | Mod: CPTII,S$GLB,, | Performed by: OTOLARYNGOLOGY

## 2019-07-30 PROCEDURE — 99213 OFFICE O/P EST LOW 20 MIN: CPT | Mod: 25,S$GLB,, | Performed by: OTOLARYNGOLOGY

## 2019-07-30 PROCEDURE — 1101F PR PT FALLS ASSESS DOC 0-1 FALLS W/OUT INJ PAST YR: ICD-10-PCS | Mod: CPTII,S$GLB,, | Performed by: OTOLARYNGOLOGY

## 2019-07-30 PROCEDURE — 31231 NASAL ENDOSCOPY DX: CPT | Mod: S$GLB,,, | Performed by: OTOLARYNGOLOGY

## 2019-07-30 PROCEDURE — 3077F PR MOST RECENT SYSTOLIC BLOOD PRESSURE >= 140 MM HG: ICD-10-PCS | Mod: CPTII,S$GLB,, | Performed by: OTOLARYNGOLOGY

## 2019-07-30 PROCEDURE — 3079F PR MOST RECENT DIASTOLIC BLOOD PRESSURE 80-89 MM HG: ICD-10-PCS | Mod: CPTII,S$GLB,, | Performed by: OTOLARYNGOLOGY

## 2019-07-30 PROCEDURE — 1101F PT FALLS ASSESS-DOCD LE1/YR: CPT | Mod: CPTII,S$GLB,, | Performed by: OTOLARYNGOLOGY

## 2019-07-30 RX ORDER — AMOXICILLIN AND CLAVULANATE POTASSIUM 875; 125 MG/1; MG/1
1 TABLET, FILM COATED ORAL 2 TIMES DAILY
Qty: 28 TABLET | Refills: 0 | Status: SHIPPED | OUTPATIENT
Start: 2019-07-30 | End: 2019-08-13

## 2019-07-30 NOTE — TELEPHONE ENCOUNTER
Spoke to pt and he reports that he was contacted this morning by Rona at [phone] and was advised that he needs to have a biopsy done.   He states someone should be contacting him to scheduled this.   Advised him if he does not hear from someone about this within a few days to call the office back and I can check on this for him. He agreed to plan.

## 2019-07-30 NOTE — TELEPHONE ENCOUNTER
Patient: Erendira Pretty       MRN: [id number]      : 1945     Age: 74 y.o.  [address] [place] [address]     Provider: Hepatologist - Thom     Urgency of review: non-urgent     Patient Transplant Status: Other Never evaluated     Reason for presentation: Indeterminate lesion     Clinical Summary:   75 yo male referred to me for iron deficiency anemia. Last seen by seen by Dr. Tomas in May 2018 for compensated liver cirrhosis. I ordered an US to up date his HCC screening. He was noted to have stable cysts but also had an indeterminate 13 mm hypoechoic focus in the right hepatic lobe. This was followed by MRI which showed a new 15 mm arterially enhancing lesion in the right hepatic lobe concerning for HCC.      Imaging to be reviewed: MRI Abd - 19     HCC Treatment History: None     ABO: O POS     Platelets:         Lab Results   Component Value Date/Time      (L) 2019 08:32 AM      Creatinine:         Lab Results   Component Value Date/Time     CREATININE 1.0 2019 08:32 AM      Bilirubin:         Lab Results   Component Value Date/Time     BILITOT 0.5 2019 08:32 AM      AFP Last 3 each if available:         Lab Results   Component Value Date/Time     AFP 4.5 05/10/2019 08:34 AM     AFP 5.3 2018 07:40 AM     AFP 5.8 10/16/2017 08:42 AM         MELD: MELD-Na score: 8 at 2018  4:37 PM  MELD score: 8 at 2018  4:37 PM  Calculated from:  Serum Creatinine: 1.0 mg/dL at 2018  4:37 PM  Serum Sodium: 137 mmol/L at 2018  4:37 PM  Total Bilirubin: 0.7 mg/dL (Rounded to 1 mg/dL) at 2018 10:16 AM  INR(ratio): 1.2 at 2018  1:32 PM  Age: 73 years     Plan: Images on Epic. MRI shows a 1.4 cm enhancing focus in segment 6 which is indeterminate. Could consider MRI in 3 months vs US guided biopsy.     Bx since seen on ultrasound       Follow-up Provider: Francisco Javier Tomas/FRNAKLYN Cordero

## 2019-07-30 NOTE — PROGRESS NOTES
Patient called and informed of the conference recommendations.  Patient informed that the ASA he is currently taking  daily will need to be stopped prior to having the biopsy done.  Message sent to DELFINA Hernandez for assistance in scheduling the procedure. Orders entered.

## 2019-07-30 NOTE — H&P (VIEW-ONLY)
Subjective:   Patient: Erendira Pretty [id number], :1945   Visit date:2019 9:04 AM    Chief Complaint: Status post sinus surgery    HPI:  Erendira is a 74 y.o. male with Chronic sinusitis.    Subjective:  3 weeks of foul drainage.     3-4 weeks of vertigo.  Lying down, standing.  No change in hearing.  No ear pain. Spells last seconds.     medical regimen: nebulized budesonide and azelastine.  PO xyzal and singulair    Surgery: 3/27/19    Sinuses operated/OR findings:   Full house    Diffuse polyps.  No purulence    Final path:   FINAL PATHOLOGIC DIAGNOSIS  Nasal contents:  Chronic sinusitis with focally increased eosinophils (up to 30 per high-power field).  Fragments of benign bone and cartilage.  OMCBR  Diagnosed by: Jh Moreno M.D.  (Electronically Signed: 2019 09:29:10)    OR Culture: na          Review of Systems:  -     Allergic/Immunologic: is allergic to lipitor [atorvastatin]; niacin preparations; iodinated contrast- oral and iv dye; omeprazole; and prilosec [omeprazole magnesium]..  -     Constitutional: Current temp:      His meds, allergies, medical, surgical, social & family histories were reviewed & updated:  -     He has a current medication list which includes the following prescription(s): accu-chek lux plus meter, accu-chek lux plus test strp, albuterol-ipratropium, aspirin, azelastine, budesonide-formoterol 160-4.5 mcg, docusate sodium, finasteride, furosemide, glucosamine/chondr leach a sod, humalog kwikpen insulin, insulin, krill oil, lancets, levocetirizine, metoprolol succinate, milk thistle, montelukast, multivit,iron,mins/folic acid, pen needle, diabetic, polyethylene glycol, rabeprazole, sildenafil, sildenafil, and synthroid, and the following Facility-Administered Medications: lactated ringers.  -     He  has a past medical history of Aortic stenosis, Asthma, BPH (benign prostatic hyperplasia), CAD (coronary artery disease), Cirrhosis, Claudication (2014), Colon polyp,  COPD (chronic obstructive pulmonary disease), ED (erectile dysfunction), Encounter for blood transfusion, Ex-smoker, Hearing loss NEC, Hepatitis C, Hyperlipidemia, Hypertension, Hypothyroid, DELONTE on CPAP, Osteoarthritis, PVD (peripheral vascular disease), and Type 2 diabetes mellitus.   -     He does not have any pertinent problems on file.   -     He  has a past surgical history that includes Knee surgery (Right); Trigger finger release (Left); Carpal tunnel release (Left); Elbow bursa surgery (Right, 2010); Rectal surgery (2012); Eye surgery; Cataract extraction w/ intraocular lens  implant, bilateral (2008); Cardiac catheterization; Colonoscopy (8/2013); Colonoscopy (N/A, 5/30/2016); Transurethral resection of prostate (TURP) without use of laser (N/A, 6/19/2018); Coronary Artery Bypass Graft (CABG) (N/A, 11/5/2018); Endoscopic harvest of vein (Left, 11/5/2018); Cardiac surgery; Catheterization of both left and right heart (N/A, 11/2/2018); Functional endoscopic sinus surgery (FESS) (Bilateral, 3/27/2019); Frontal sinus obliteration (Bilateral, 3/27/2019); Maxillary antrostomy (Bilateral, 3/27/2019); Ethmoidectomy (Bilateral, 3/27/2019); Nasal septoplasty (N/A, 3/27/2019); Knee arthroscopy w/ meniscal repair (Left, 06/2019); Esophagogastroduodenoscopy (N/A, 7/17/2019); and Colonoscopy (N/A, 7/17/2019).  -     He  reports that he quit smoking about 47 years ago. He has a 2.00 pack-year smoking history. He quit smokeless tobacco use about 42 years ago. His smokeless tobacco use included chew. He reports that he drinks about 1.2 oz of alcohol per week. He reports that he does not use drugs.  -     His family history includes Heart disease in his brother, father, and mother.  -     He is allergic to lipitor [atorvastatin]; niacin preparations; iodinated contrast- oral and iv dye; omeprazole; and prilosec [omeprazole magnesium].    Objective:   Physical Exam:  Vitals:  There were no vitals taken for this  visit.  General appearance:  Well developed, well nourished    Eyes:  Extraocular motions intact, PERRL    Communication:  no hoarseness, no dysphonia    Ears:  Otoscopy of external auditory canals and tympanic membranes was normal, clinical speech reception thresholds grossly intact, no mass/lesion of auricle.  Nose:  No masses/lesions of external nose, nasal mucosa, septum, and turbinates were within normal limits.  Mouth:  No mass/lesion of lips, teeth, gums, hard/soft palate, tongue, tonsils, or oropharynx.    Cardiovascular:  No pedal edema; Radial Pulses +2     Neck & Lymphatics:  No cervical lymphadenopathy, no neck mass/crepitus/ asymmetry, trachea is midline, no thyroid enlargement/tenderness/mass.    Psych: Oriented x3,  Alert with normal mood and affect.     Respiration/Chest:  Symmetric expansion during respiration, normal respiratory effort.    Skin:  Warm and intact. No ulcerations of face, scalp, neck.      Assessment & Plan:     Left sphenoid with active infection.  Culture taken    Epley performed today            NASAL ENDOSCOPY  Procedure: Risks, benefits, and alternatives of the procedure were discussed with the patient, and the patient consented to the nasal endoscopy with debridement.  The nasal cavity was sprayed with a topical decongestant and anesthetic . The endoscope was passed into each nostril and each nasal cavity was visualized.  On each side the nasal cavity, sinuses (if open), turbinates, and septum were examined with the findings described below.  Crusting and polypoid material was removed with suction and straight non thru cut forceps.   At the end of the examination, the scope was removed. The patient tolerated the procedure well with no complications.     Endoscopic Sinonasal Exam Findings:  -     Sinuses examined: bilateral maxillary, bilateral ethmoid anterior and posterior, bilateral sphenoid and bilateral frontal            The right sinus(es) have normal mucosa             The left sinus(es) have normal mucosa  -     Nasal secretions: thick purulence in left sphenoid  -     Nasal septum: RIGHT mild deviation   -     Inferior turbinate: normal bilaterally  -     Middle turbinate: turbinate partially resected bilaterally  -     Other findings: - no mass or obstructive lesion -    Assessment & Plan:  See today's clinic note.

## 2019-07-30 NOTE — PATIENT INSTRUCTIONS
Benign Paroxysmal Positional Vertigo  Benign paroxysmal positional vertigo (BPPV) is a problem with the inner ear. The inner ear contains the vestibular system. This system is what helps you keep your balance. BPPV causes a feeling of spinning. It is a common problem of the vestibular system.      Understanding the vestibular system  The vestibular system of the ear is made up of very tiny parts. They include the utricle, saccule, and semicircular canals. The utricle is a tiny organ that contains calcium crystals. In some people, the crystals can move into the semicircular canals. When this happens, the system no longer works as it should. This causes BPPV. Benign means it is not life-threatening. Paroxysmal means it happens suddenly. Positional means that it happens when you move your head. Vertigo is a feeling of spinning.    What causes BPPV?  Causes include injury to your head or neck. Other problems with the vestibular system may cause BPPV. In many people, the cause of BPPV is not known.    Symptoms of BPPV  You many have repeated feelings of spinning (vertigo). The vertigo usually lasts less than 1 minute. Some movements, suchas rolling over in bed, can bring on vertigo.    Diagnosing BPPV  Your primary health care provider may diagnose and treat your BPPV. Or you may see an ear, nose, and throat doctor (otolaryngologist). In some cases, you may see a nervous system doctor (neurologist).  The health care provider will ask about your symptoms and your medical history. He or she will examine you. You may have hearing and balance tests. As part of the exam, your health care provider may have you move your head and body in certain ways. If you have BPPV, the movements can bring on vertigo. Your provider will also look for abnormal movements of your eyes. You may have other tests to check your vestibular or nervous systems.    Treatment for BPPV  Your health care provider may try to move the calcium crystals.  "This is done by having you move your head and neck in certain ways. This treatment is safe and often works well. You may also be told to do these movements at home. You may still have vertigo for a few weeks. Your health care provider will recheck your symptoms, usually in about a month. Special physical therapy may also be part of treatment.  In rare cases surgery may be needed for BPPV that does not go away.           INSTRUCTIONS FOR PATIENTS AFTER OFFICE TREATMENTS     1. Wait for 10 minutes after the maneuver is performed before going home. This is to avoid "quick spins," or brief bursts of vertigo as debris repositions itself immediately after the maneuver. Don't drive yourself home.     2. Sleep semi-recumbent for the next night. This means sleep with your head shelter between being flat and upright (a 45 degree angle). This is most easily done by using a recliner chair or by using pillows arranged on a couch (see figure 3). During the day, try to keep your head vertical. You must not go to the hairdresser or dentist. No exercise which requires head movement. When men shave under their chins, they should bend their bodies forward in order to keep their head vertical. If eye drops are required, try to put them in without tilting the head back. Shampoo only under the shower.     3. For at least one week, avoid provoking head positions that might bring BPPV on again.   Use two pillows when you sleep.   Avoid sleeping on the "bad" side.   Don't turn your head far up or far down.   Be careful to avoid head-extended position, in which you are lying on your back, especially with your head turned towards the affected side. This means be cautious at the [facility], dentist's office, and while undergoing minor surgery. Try to stay as upright as possible. Exercises for low-back pain should be stopped for a week. No "sit-ups" should be done for at least one week and no "crawl" swimming. (Breast stroke is OK.) Also " avoid far head-forward positions such as might occur in certain exercises (i.e. touching the toes). Do not start doing the Hood-Daroff exercises immediately or 2 days after the Epley or Semont maneuver, unless specifically instructed otherwise by your health care provider.    4. At one week after treatment, put yourself in the position that usually makes you dizzy. Position yourself cautiously and under conditions in which you can't fall or hurt yourself. Let your doctor know how you did.

## 2019-07-31 ENCOUNTER — TELEPHONE (OUTPATIENT)
Dept: OTOLARYNGOLOGY | Facility: CLINIC | Age: 74
End: 2019-07-31

## 2019-07-31 NOTE — TELEPHONE ENCOUNTER
Informed pts wife of the instructions prior to his appointment next week on 8/6/19. She verbalized understanding.        ----- Message from Nando Dejesus sent at 7/31/2019  8:42 AM CDT -----  Contact: Agata - pt wife   States pt was in on yesterday and is calling to follow up on the papers to  with the exercises for pt and can be reached at [phone]//thanks/dbw

## 2019-08-02 ENCOUNTER — TELEPHONE (OUTPATIENT)
Dept: OTOLARYNGOLOGY | Facility: CLINIC | Age: 74
End: 2019-08-02

## 2019-08-02 DIAGNOSIS — J32.4 CHRONIC PANSINUSITIS: Primary | ICD-10-CM

## 2019-08-02 LAB — BACTERIA SPEC AEROBE CULT: NORMAL

## 2019-08-02 RX ORDER — MOXIFLOXACIN HYDROCHLORIDE 400 MG/1
400 TABLET ORAL DAILY
Qty: 14 TABLET | Refills: 0 | Status: SHIPPED | OUTPATIENT
Start: 2019-08-02 | End: 2019-08-16

## 2019-08-02 NOTE — TELEPHONE ENCOUNTER
Called pt and informed that abx was called in to his pharmacy. Pt verbalized understanding.        ----- Message from Alejandro Parkinson MD sent at 8/2/2019  1:31 PM CDT -----  I sent in abx

## 2019-08-05 DIAGNOSIS — K76.9 LIVER LESION: Primary | ICD-10-CM

## 2019-08-06 ENCOUNTER — CLINICAL SUPPORT (OUTPATIENT)
Dept: AUDIOLOGY | Facility: CLINIC | Age: 74
End: 2019-08-06
Payer: MEDICARE

## 2019-08-06 DIAGNOSIS — H81.11 BPPV (BENIGN PAROXYSMAL POSITIONAL VERTIGO), RIGHT: Primary | ICD-10-CM

## 2019-08-06 PROCEDURE — 92542 PR POSITIONAL NYSTAGMUS TEST: ICD-10-PCS | Mod: S$GLB,,, | Performed by: AUDIOLOGIST-HEARING AID FITTER

## 2019-08-06 PROCEDURE — 92542 POSITIONAL NYSTAGMUS TEST: CPT | Mod: S$GLB,,, | Performed by: AUDIOLOGIST-HEARING AID FITTER

## 2019-08-06 NOTE — PROGRESS NOTES
Referring provider: Dr. Iggy Pretty was seen 08/06/2019 for a vestibular screen for suspected BPPV.  Patient complains of spinning dizziness when lying in bed, arising from bed or with quick movements.  It lasts for a few seconds and started several weeks ago following sinus infection.     VNG Screen:  Spontaneous test: Absent for nystagmus  Uledi-Hallpike Right:  Positive for BPPV.  Robust 10 d/s LB + 17 d/s UB torsional nystagmus, (+) fatigue, (+) dizziness  Billy-Hallpike Left: Negative for BPPV.  Absent for nystagmus or dizziness.  Sitting HC: Absent for nystagmus  Head-hanging forward: 9 d/s DB nystagmus that suppressed with fixation, consistent with BPPV    Summary: BPPV, right ear.  A 5-position Epley maneuver was completed to the right.  Patient tolerated the maneuver well and was asymptomatic upon discharge.  Post Epley instructions were given and reviewed with the patient.  Understanding was voiced.    Recommendations:  1. Return in one week to ensure BPPV is resolved.

## 2019-08-12 ENCOUNTER — CLINICAL SUPPORT (OUTPATIENT)
Dept: AUDIOLOGY | Facility: CLINIC | Age: 74
End: 2019-08-12
Payer: MEDICARE

## 2019-08-12 ENCOUNTER — TELEPHONE (OUTPATIENT)
Dept: RADIOLOGY | Facility: HOSPITAL | Age: 74
End: 2019-08-12

## 2019-08-12 DIAGNOSIS — H81.11 BENIGN PAROXYSMAL POSITIONAL VERTIGO OF RIGHT EAR: Primary | ICD-10-CM

## 2019-08-12 NOTE — PROGRESS NOTES
Erendira Pretty was seen today for a one week follow up BPPV check.  Jesus Ramirez was negative today.      REC:    Pt will follow up as needed

## 2019-08-13 ENCOUNTER — HOSPITAL ENCOUNTER (OUTPATIENT)
Dept: RADIOLOGY | Facility: HOSPITAL | Age: 74
Discharge: HOME | End: 2019-08-13
Attending: PHYSICIAN ASSISTANT
Payer: MEDICARE

## 2019-08-13 VITALS
DIASTOLIC BLOOD PRESSURE: 73 MMHG | OXYGEN SATURATION: 96 % | SYSTOLIC BLOOD PRESSURE: 149 MMHG | HEART RATE: 74 BPM | RESPIRATION RATE: 16 BRPM

## 2019-08-13 VITALS
HEART RATE: 73 BPM | OXYGEN SATURATION: 94 % | SYSTOLIC BLOOD PRESSURE: 162 MMHG | RESPIRATION RATE: 15 BRPM | BODY MASS INDEX: 37.59 KG/M2 | WEIGHT: 248 LBS | DIASTOLIC BLOOD PRESSURE: 72 MMHG | HEIGHT: 68 IN

## 2019-08-13 DIAGNOSIS — K76.9 LIVER LESION: ICD-10-CM

## 2019-08-13 DIAGNOSIS — R16.0 LIVER MASS, RIGHT LOBE: ICD-10-CM

## 2019-08-13 PROCEDURE — 88342 IMHCHEM/IMCYTCHM 1ST ANTB: CPT | Performed by: PATHOLOGY

## 2019-08-13 PROCEDURE — 88342 IMHCHEM/IMCYTCHM 1ST ANTB: CPT | Mod: 26,,, | Performed by: PATHOLOGY

## 2019-08-13 PROCEDURE — 88341 PR IHC OR ICC EACH ADD'L SINGLE ANTIBODY  STAINPR: ICD-10-PCS | Mod: 26,,, | Performed by: PATHOLOGY

## 2019-08-13 PROCEDURE — 47000 NEEDLE BIOPSY OF LIVER PERQ: CPT

## 2019-08-13 PROCEDURE — 88307 TISSUE EXAM BY PATHOLOGIST: CPT | Performed by: PATHOLOGY

## 2019-08-13 PROCEDURE — 63600175 PHARM REV CODE 636 W HCPCS: Performed by: RADIOLOGY

## 2019-08-13 PROCEDURE — 88313 TISSUE SPECIMEN TO PATHOLOGY, RADIOLOGY: ICD-10-PCS | Mod: 26,,, | Performed by: PATHOLOGY

## 2019-08-13 PROCEDURE — 88307 TISSUE EXAM BY PATHOLOGIST: CPT | Mod: 26,,, | Performed by: PATHOLOGY

## 2019-08-13 PROCEDURE — 88307 TISSUE SPECIMEN TO PATHOLOGY, RADIOLOGY: ICD-10-PCS | Mod: 26,,, | Performed by: PATHOLOGY

## 2019-08-13 PROCEDURE — 88342 TISSUE SPECIMEN TO PATHOLOGY, RADIOLOGY: ICD-10-PCS | Mod: 26,,, | Performed by: PATHOLOGY

## 2019-08-13 PROCEDURE — 88341 IMHCHEM/IMCYTCHM EA ADD ANTB: CPT | Mod: 26,,, | Performed by: PATHOLOGY

## 2019-08-13 PROCEDURE — 88313 SPECIAL STAINS GROUP 2: CPT | Mod: 26,,, | Performed by: PATHOLOGY

## 2019-08-13 RX ORDER — FENTANYL CITRATE 50 UG/ML
INJECTION, SOLUTION INTRAMUSCULAR; INTRAVENOUS CODE/TRAUMA/SEDATION MEDICATION
Status: COMPLETED | OUTPATIENT
Start: 2019-08-13 | End: 2019-08-13

## 2019-08-13 RX ORDER — MIDAZOLAM HYDROCHLORIDE 1 MG/ML
INJECTION INTRAMUSCULAR; INTRAVENOUS CODE/TRAUMA/SEDATION MEDICATION
Status: COMPLETED | OUTPATIENT
Start: 2019-08-13 | End: 2019-08-13

## 2019-08-13 RX ADMIN — MIDAZOLAM HYDROCHLORIDE 0.5 MG: 1 INJECTION, SOLUTION INTRAMUSCULAR; INTRAVENOUS at 09:08

## 2019-08-13 RX ADMIN — FENTANYL CITRATE 50 MCG: 50 INJECTION, SOLUTION INTRAMUSCULAR; INTRAVENOUS at 09:08

## 2019-08-13 RX ADMIN — FENTANYL CITRATE 25 MCG: 50 INJECTION, SOLUTION INTRAMUSCULAR; INTRAVENOUS at 09:08

## 2019-08-13 RX ADMIN — MIDAZOLAM HYDROCHLORIDE 1 MG: 1 INJECTION, SOLUTION INTRAMUSCULAR; INTRAVENOUS at 09:08

## 2019-08-13 NOTE — SEDATION DOCUMENTATION
Procedure complete.  Pressure held to puncture site.  Puncture site covered with band aid - C/D/I.

## 2019-08-13 NOTE — DISCHARGE SUMMARY
Pre Op Diagnosis: liver mass     Post Op Diagnosis: same     Procedure:  US guided liver mass biopsy     Procedure performed by: Deloris DEE, Frederick RICARDO     Written Informed Consent Obtained: Yes     Specimen Removed:  yes     Estimated Blood Loss:  minimal     Findings: Local anesthesia and moderate sedation were used.     The patient tolerated the procedure well and there were no complications.      Sterile technique was performed in the RUQ, lidocaine was used as a local anesthetic.  Multiple samples taken from liver mass.  Pt tolerated the procedure well without immediate complications.  Please see radiologist report for details. F/u with PCP and/or ordering physician.

## 2019-08-13 NOTE — PLAN OF CARE
Patient recovery complete.  No c/o pain/SOB.  VSS.  Band aid to puncture site - C/D/I. Patient and wife verbalize understanding of DC instructions.  Patient dc'd via WC to home with wife.

## 2019-08-13 NOTE — DISCHARGE INSTRUCTIONS
Discharge Instructions for Liver Biopsy  You had a procedure called liver biopsy. A healthcare provider used a special needle to remove a small piece of tissue from your liver. Then it was examined for signs of damage or disease. A liver biopsy is ordered after other tests have shown that your liver is not working properly. You may also have a liver biopsy when liver disease is suspected, to determine whether there is too much iron in the liver, or to rule out cancer.  Home care  Recommendations include the following:   · Because you had anesthesia, you should not drive until the day after your biopsy.   · Remove the bandage covering the biopsy site 48 hours after the procedure.  · Rest for 6 hours and take it easy when you arrive home.  · Dont shower for 24 hours after the biopsy. If you wish, you may wash yourself with a sponge or washcloth. When you are able to shower, dont scrub the site. Gently wash the area and pat it dry.  · Dont lift anything heavier than 10 pounds for up to 1 week after the procedure, or as advised by your healthcare provider.  · Don't do strenuous activities or exercises for up to 1 week after the procedure.  · Ask your healthcare provider when you can return to work.  · Do not start taking blood thinners without clear instructions from your healthcare provider.  Follow-up care  Make a follow-up appointment as directed by our staff.     When to call your healthcare provider  Call your healthcare provider immediately if you have any of the following:  · Bleeding from the biopsy site  · Dizziness or lightheadedness  · Sudden or increased shortness of breath  · Sudden chest pain  · Fever of 100.4°F (38.0°C) or higher, or as directed by your healthcare provider  · Shaking chills  · Yellow eyes or skin  · Increasing redness, tenderness, or swelling at the biopsy site  · Drainage from the biopsy site  · Opening of the biopsy site  · Vomiting blood  · Rectal bleeding or bloody  stools  · Increasing pain, with or without activity, in the liver or belly area, or pain shooting to the right shoulder   Date Last Reviewed: 7/1/2016 © 2000-2017 [employer]. [address] [place] [address]. All rights reserved. This information is not intended as a substitute for professional medical care. Always follow your healthcare professional's instructions.        Recovery After Procedural Sedation (Adult)  You have been given medicine by vein to make you sleep during your surgery. This may have included both a pain medicine and sleeping medicine. Most of the effects have worn off. But you may still have some drowsiness for the next 6 to 8 hours.  Home care  Follow these guidelines when you get home:  · For the next 8 hours, you should be watched by a responsible adult. This person should make sure your condition is not getting worse.  · Don't drink any alcohol for the next 24 hours.  · Don't drive, operate dangerous machinery, or make important business or personal decisions during the next 24 hours.  Note: Your healthcare provider may tell you not to take any medicine by mouth for pain or sleep in the next 4 hours. These medicines may react with the medicines you were given in the hospital. This could cause a much stronger response than usual.  Follow-up care  Follow up with your healthcare provider if you are not alert and back to your usual level of activity within 12 hours.  When to seek medical advice  Call your healthcare provider right away if any of these occur:  · Drowsiness gets worse  · Weakness or dizziness gets worse  · Repeated vomiting  · You can't be awakened   Date Last Reviewed: 10/18/2016  © 9660-8449 [employer]. [address] [place] [address]. All rights reserved. This information is not intended as a substitute for professional medical care. Always follow your healthcare professional's instructions.

## 2019-08-13 NOTE — DISCHARGE INSTRUCTIONS
Discharge Instructions for Liver Biopsy  You had a procedure called liver biopsy. A healthcare provider used a special needle to remove a small piece of tissue from your liver. Then it was examined for signs of damage or disease. A liver biopsy is ordered after other tests have shown that your liver is not working properly. You may also have a liver biopsy when liver disease is suspected, to determine whether there is too much iron in the liver, or to rule out cancer.  Home care  Recommendations include the following:   · Because you had anesthesia, you should not drive until the day after your biopsy.   · Remove the bandage covering the biopsy site 48 hours after the procedure.  · Rest for 6 hours and take it easy when you arrive home.  · Dont shower for 24 hours after the biopsy. If you wish, you may wash yourself with a sponge or washcloth. When you are able to shower, dont scrub the site. Gently wash the area and pat it dry.  · Dont lift anything heavier than 10 pounds for up to 1 week after the procedure, or as advised by your healthcare provider.  · Don't do strenuous activities or exercises for up to 1 week after the procedure.  · Ask your healthcare provider when you can return to work.  · Do not start taking blood thinners without clear instructions from your healthcare provider.  Follow-up care  Make a follow-up appointment as directed by our staff.     When to call your healthcare provider  Call your healthcare provider immediately if you have any of the following:  · Bleeding from the biopsy site  · Dizziness or lightheadedness  · Sudden or increased shortness of breath  · Sudden chest pain  · Fever of 100.4°F (38.0°C) or higher, or as directed by your healthcare provider  · Shaking chills  · Yellow eyes or skin  · Increasing redness, tenderness, or swelling at the biopsy site  · Drainage from the biopsy site  · Opening of the biopsy site  · Vomiting blood  · Rectal bleeding or bloody  stools  · Increasing pain, with or without activity, in the liver or belly area, or pain shooting to the right shoulder   Date Last Reviewed: 7/1/2016 © 2000-2017 [employer]. [address] [place] [address]. All rights reserved. This information is not intended as a substitute for professional medical care. Always follow your healthcare professional's instructions.        Recovery After Procedural Sedation (Adult)  You have been given medicine by vein to make you sleep during your surgery. This may have included both a pain medicine and sleeping medicine. Most of the effects have worn off. But you may still have some drowsiness for the next 6 to 8 hours.  Home care  Follow these guidelines when you get home:  · For the next 8 hours, you should be watched by a responsible adult. This person should make sure your condition is not getting worse.  · Don't drink any alcohol for the next 24 hours.  · Don't drive, operate dangerous machinery, or make important business or personal decisions during the next 24 hours.  Note: Your healthcare provider may tell you not to take any medicine by mouth for pain or sleep in the next 4 hours. These medicines may react with the medicines you were given in the hospital. This could cause a much stronger response than usual.  Follow-up care  Follow up with your healthcare provider if you are not alert and back to your usual level of activity within 12 hours.  When to seek medical advice  Call your healthcare provider right away if any of these occur:  · Drowsiness gets worse  · Weakness or dizziness gets worse  · Repeated vomiting  · You can't be awakened   Date Last Reviewed: 10/18/2016  © 1839-1018 [employer]. [address] [place] [address]. All rights reserved. This information is not intended as a substitute for professional medical care. Always follow your healthcare professional's instructions.

## 2019-08-19 ENCOUNTER — TELEPHONE (OUTPATIENT)
Dept: RADIOLOGY | Facility: HOSPITAL | Age: 74
End: 2019-08-19

## 2019-08-19 ENCOUNTER — OFFICE VISIT (OUTPATIENT)
Dept: GASTROENTEROLOGY | Facility: CLINIC | Age: 74
End: 2019-08-19
Payer: MEDICARE

## 2019-08-19 VITALS
SYSTOLIC BLOOD PRESSURE: 150 MMHG | BODY MASS INDEX: 37.15 KG/M2 | HEART RATE: 71 BPM | DIASTOLIC BLOOD PRESSURE: 84 MMHG | WEIGHT: 245.13 LBS | HEIGHT: 68 IN | OXYGEN SATURATION: 97 %

## 2019-08-19 DIAGNOSIS — K76.9 LIVER LESION: Primary | ICD-10-CM

## 2019-08-19 DIAGNOSIS — D50.9 IRON DEFICIENCY ANEMIA, UNSPECIFIED IRON DEFICIENCY ANEMIA TYPE: ICD-10-CM

## 2019-08-19 DIAGNOSIS — K74.69 OTHER CIRRHOSIS OF LIVER: ICD-10-CM

## 2019-08-19 PROCEDURE — 1101F PR PT FALLS ASSESS DOC 0-1 FALLS W/OUT INJ PAST YR: ICD-10-PCS | Mod: CPTII,S$GLB,, | Performed by: INTERNAL MEDICINE

## 2019-08-19 PROCEDURE — 99214 PR OFFICE/OUTPT VISIT, EST, LEVL IV, 30-39 MIN: ICD-10-PCS | Mod: S$GLB,,, | Performed by: INTERNAL MEDICINE

## 2019-08-19 PROCEDURE — 3079F DIAST BP 80-89 MM HG: CPT | Mod: CPTII,S$GLB,, | Performed by: INTERNAL MEDICINE

## 2019-08-19 PROCEDURE — 1101F PT FALLS ASSESS-DOCD LE1/YR: CPT | Mod: CPTII,S$GLB,, | Performed by: INTERNAL MEDICINE

## 2019-08-19 PROCEDURE — 3079F PR MOST RECENT DIASTOLIC BLOOD PRESSURE 80-89 MM HG: ICD-10-PCS | Mod: CPTII,S$GLB,, | Performed by: INTERNAL MEDICINE

## 2019-08-19 PROCEDURE — 3077F SYST BP >= 140 MM HG: CPT | Mod: CPTII,S$GLB,, | Performed by: INTERNAL MEDICINE

## 2019-08-19 PROCEDURE — 3077F PR MOST RECENT SYSTOLIC BLOOD PRESSURE >= 140 MM HG: ICD-10-PCS | Mod: CPTII,S$GLB,, | Performed by: INTERNAL MEDICINE

## 2019-08-19 PROCEDURE — 99214 OFFICE O/P EST MOD 30 MIN: CPT | Mod: S$GLB,,, | Performed by: INTERNAL MEDICINE

## 2019-08-19 PROCEDURE — 99999 PR PBB SHADOW E&M-EST. PATIENT-LVL III: ICD-10-PCS | Mod: PBBFAC,,, | Performed by: INTERNAL MEDICINE

## 2019-08-19 PROCEDURE — 99999 PR PBB SHADOW E&M-EST. PATIENT-LVL III: CPT | Mod: PBBFAC,,, | Performed by: INTERNAL MEDICINE

## 2019-08-19 NOTE — PROGRESS NOTES
Subjective:     Erendira Pretty is here for follow up of cirrhosis    HPI  Since Erendira Pretty's last visit he has had a number of medical issues including but not limited to a CABG, knee surgery.  Overall feels well at this time.  He is here to follow-up on results.  He was found to have a new lesion around 14 cm on ultrasound then resulted MRI scan and biopsy.    Throughout the complex he that he has had there has been no issues with liver decompensation.  He did have problem with iron deficiency anemia.  No overt GI blood loss.  This was present after his CABG.  He received iron transfusions.    No evidence of liver decompensation: no ascites, confusion or GI bleeding.    IR 7/2019  Plan: Images on Epic. MRI shows a 1.4 cm enhancing focus in segment 6 which is indeterminate. Could consider MRI in 3 months vs US guided biopsy.      Bx since seen on ultrasound         Review of Systems    Objective:     Physical Exam   Constitutional: He is oriented to person, place, and time. He appears well-developed and well-nourished. No distress.   HENT:   Head: Normocephalic and atraumatic.   Mouth/Throat: Oropharynx is clear and moist. No oropharyngeal exudate.   Eyes: Pupils are equal, round, and reactive to light. Conjunctivae are normal. Right eye exhibits no discharge. Left eye exhibits no discharge. No scleral icterus.   Pulmonary/Chest: Effort normal and breath sounds normal. No respiratory distress. He has no wheezes.   Abdominal: Soft. He exhibits no distension. There is no tenderness.   Musculoskeletal: He exhibits edema (trace SISI).   Neurological: He is alert and oriented to person, place, and time.   Psychiatric: He has a normal mood and affect. His behavior is normal.   Vitals reviewed.      MELD-Na score: 8 at 11/6/2018  4:37 PM  MELD score: 8 at 11/6/2018  4:37 PM  Calculated from:  Serum Creatinine: 1.0 mg/dL at 11/6/2018  4:37 PM  Serum Sodium: 137 mmol/L at 11/6/2018  4:37 PM  Total Bilirubin: 0.7 mg/dL  (Rounded to 1 mg/dL) at 11/4/2018 10:16 AM  INR(ratio): 1.2 at 11/5/2018  1:32 PM  Age: 73 years    WBC   Date Value Ref Range Status   08/13/2019 6.26 3.90 - 12.70 K/uL Final     Hemoglobin   Date Value Ref Range Status   08/13/2019 14.5 14.0 - 18.0 g/dL Final     POC Hematocrit   Date Value Ref Range Status   11/05/2018 26 (L) 36 - 54 %PCV Final     Hematocrit   Date Value Ref Range Status   08/13/2019 41.2 40.0 - 54.0 % Final     Platelets   Date Value Ref Range Status   08/13/2019 152 150 - 350 K/uL Final     BUN, Bld   Date Value Ref Range Status   06/04/2019 10 8 - 23 mg/dL Final     Creatinine   Date Value Ref Range Status   06/04/2019 1.0 0.5 - 1.4 mg/dL Final     Glucose   Date Value Ref Range Status   06/04/2019 157 (H) 70 - 110 mg/dL Final     Calcium   Date Value Ref Range Status   06/04/2019 9.2 8.7 - 10.5 mg/dL Final     Sodium   Date Value Ref Range Status   06/04/2019 141 136 - 145 mmol/L Final     Potassium   Date Value Ref Range Status   06/04/2019 4.2 3.5 - 5.1 mmol/L Final     Chloride   Date Value Ref Range Status   06/04/2019 102 95 - 110 mmol/L Final     Magnesium   Date Value Ref Range Status   11/08/2018 2.1 1.6 - 2.6 mg/dL Final     AST   Date Value Ref Range Status   06/04/2019 36 10 - 40 U/L Final     ALT   Date Value Ref Range Status   06/04/2019 33 10 - 44 U/L Final     Alkaline Phosphatase   Date Value Ref Range Status   06/04/2019 100 55 - 135 U/L Final     Total Bilirubin   Date Value Ref Range Status   06/04/2019 0.5 0.1 - 1.0 mg/dL Final     Comment:     For infants and newborns, interpretation of results should be based  on gestational age, weight and in agreement with clinical  observations.  Premature Infant recommended reference ranges:  Up to 24 hours.............<8.0 mg/dL  Up to 48 hours............<12.0 mg/dL  3-5 days..................<15.0 mg/dL  6-29 days.................<15.0 mg/dL       Albumin   Date Value Ref Range Status   06/04/2019 3.9 3.5 - 5.2 g/dL Final     INR    Date Value Ref Range Status   08/13/2019 0.9 0.8 - 1.2 Final     Comment:     Coumadin Therapy:  2.0 - 3.0 for INR for all indicators except mechanical heart valves  and antiphospholipid syndromes which should use 2.5 - 3.5.           Assessment/Plan:     1. Liver lesion    2. Other cirrhosis of liver    3. Iron deficiency anemia, unspecified iron deficiency anemia type      Erendira Pretty is a 74 y.o. male withResults    Liver lesion-contacted pathologist.  There waiting special stains.  -extensive discussion with patient that if this is HCC it is small and recommend proceeding with ablation.  Advised that ablation is not curative but can have a very sustained response.  Explained he would not be a transplant a resection candidate given his cirrhosis and comorbidities along with age.  -should have official biopsy read back later today or tomorrow    Other cirrhosis of liver-low meld, well compensated  -Continue to monitor MELD labs  -Continue with HCC surveillance  -Continue variceal screening-7/2022  -     Comprehensive metabolic panel; Future; Expected date: 08/19/2019  -     CBC auto differential; Future; Expected date: 08/19/2019  -     AFP tumor marker; Future; Expected date: 08/19/2019  -     Protime-INR; Future; Expected date: 08/19/2019    Iron deficiency anemia, unspecified iron deficiency anemia type-likely related to surgery, seems resolved  -     CBC auto differential; Future; Expected date: 08/19/2019    More than 50% of 30 min clinic visit spent discussing possible diagnosis and plan.    RTC in 3 months with preclinic labs and imaging    Anna Tomas MD

## 2019-08-20 ENCOUNTER — TELEPHONE (OUTPATIENT)
Dept: INTERNAL MEDICINE | Facility: CLINIC | Age: 74
End: 2019-08-20

## 2019-08-20 NOTE — TELEPHONE ENCOUNTER
----- Message from Ken Morales sent at 8/20/2019 10:56 AM CDT -----  Contact: Agata   Would like to know if needs to keep nurse visit on 8/22.           [phone]

## 2019-08-21 ENCOUNTER — TELEPHONE (OUTPATIENT)
Dept: GASTROENTEROLOGY | Facility: CLINIC | Age: 74
End: 2019-08-21

## 2019-08-21 ENCOUNTER — TELEPHONE (OUTPATIENT)
Dept: INTERNAL MEDICINE | Facility: CLINIC | Age: 74
End: 2019-08-21

## 2019-08-21 NOTE — TELEPHONE ENCOUNTER
----- Message from Catherine Mathur sent at 8/21/2019  8:21 AM CDT -----  Contact: Pt  Pt is calling in regards to cancelling nurse visit on 08/22.

## 2019-08-21 NOTE — TELEPHONE ENCOUNTER
----- Message from Phi Cordero PA-C sent at 8/21/2019 12:58 PM CDT -----  Biopsy was positive for HCC.   Phi

## 2019-08-21 NOTE — TELEPHONE ENCOUNTER
Spoke with patient and let him know bx confirmed HCC.  Recommend we move forward with ablation given the small size.  Will send message to interventional Radiology.  Patient is agreeable.

## 2019-08-22 ENCOUNTER — OFFICE VISIT (OUTPATIENT)
Dept: CARDIOLOGY | Facility: CLINIC | Age: 74
End: 2019-08-22
Payer: MEDICARE

## 2019-08-22 ENCOUNTER — TELEPHONE (OUTPATIENT)
Dept: RADIOLOGY | Facility: HOSPITAL | Age: 74
End: 2019-08-22

## 2019-08-22 VITALS
BODY MASS INDEX: 37.49 KG/M2 | SYSTOLIC BLOOD PRESSURE: 118 MMHG | WEIGHT: 247.38 LBS | DIASTOLIC BLOOD PRESSURE: 82 MMHG | HEART RATE: 88 BPM | HEIGHT: 68 IN

## 2019-08-22 DIAGNOSIS — E78.2 MIXED HYPERLIPIDEMIA: ICD-10-CM

## 2019-08-22 DIAGNOSIS — Z95.1 S/P CABG X 2: ICD-10-CM

## 2019-08-22 DIAGNOSIS — I73.9 PVD (PERIPHERAL VASCULAR DISEASE): ICD-10-CM

## 2019-08-22 DIAGNOSIS — I49.3 PVC (PREMATURE VENTRICULAR CONTRACTION): ICD-10-CM

## 2019-08-22 DIAGNOSIS — I10 ESSENTIAL HYPERTENSION: Primary | ICD-10-CM

## 2019-08-22 DIAGNOSIS — I25.10 CORONARY ARTERY DISEASE INVOLVING NATIVE CORONARY ARTERY OF NATIVE HEART WITHOUT ANGINA PECTORIS: ICD-10-CM

## 2019-08-22 PROCEDURE — 99214 PR OFFICE/OUTPT VISIT, EST, LEVL IV, 30-39 MIN: ICD-10-PCS | Mod: S$GLB,,, | Performed by: INTERNAL MEDICINE

## 2019-08-22 PROCEDURE — 3079F PR MOST RECENT DIASTOLIC BLOOD PRESSURE 80-89 MM HG: ICD-10-PCS | Mod: CPTII,S$GLB,, | Performed by: INTERNAL MEDICINE

## 2019-08-22 PROCEDURE — 3079F DIAST BP 80-89 MM HG: CPT | Mod: CPTII,S$GLB,, | Performed by: INTERNAL MEDICINE

## 2019-08-22 PROCEDURE — 99214 OFFICE O/P EST MOD 30 MIN: CPT | Mod: S$GLB,,, | Performed by: INTERNAL MEDICINE

## 2019-08-22 PROCEDURE — 3074F PR MOST RECENT SYSTOLIC BLOOD PRESSURE < 130 MM HG: ICD-10-PCS | Mod: CPTII,S$GLB,, | Performed by: INTERNAL MEDICINE

## 2019-08-22 PROCEDURE — 1101F PR PT FALLS ASSESS DOC 0-1 FALLS W/OUT INJ PAST YR: ICD-10-PCS | Mod: CPTII,S$GLB,, | Performed by: INTERNAL MEDICINE

## 2019-08-22 PROCEDURE — 99999 PR PBB SHADOW E&M-EST. PATIENT-LVL III: CPT | Mod: PBBFAC,,, | Performed by: INTERNAL MEDICINE

## 2019-08-22 PROCEDURE — 99999 PR PBB SHADOW E&M-EST. PATIENT-LVL III: ICD-10-PCS | Mod: PBBFAC,,, | Performed by: INTERNAL MEDICINE

## 2019-08-22 PROCEDURE — 1101F PT FALLS ASSESS-DOCD LE1/YR: CPT | Mod: CPTII,S$GLB,, | Performed by: INTERNAL MEDICINE

## 2019-08-22 PROCEDURE — 3074F SYST BP LT 130 MM HG: CPT | Mod: CPTII,S$GLB,, | Performed by: INTERNAL MEDICINE

## 2019-08-22 NOTE — PROGRESS NOTES
Subjective:   Patient ID:  Erendira Pretty is a 74 y.o. male who presents for follow-up of Hypertension  Pt recently diagnosed with liver lesion possible HCC and ablation planned.  Patient denies CP, angina or anginal equivalent.    Hypertension   This is a chronic problem. The current episode started more than 1 year ago. The problem has been gradually improving since onset. Pertinent negatives include no chest pain, palpitations or shortness of breath. Past treatments include beta blockers and diuretics. The current treatment provides moderate improvement. Compliance problems include medication side effects.    Coronary Artery Disease   Presents for follow-up visit. Pertinent negatives include no chest pain, chest pressure, chest tightness, dizziness, leg swelling, muscle weakness, palpitations, shortness of breath or weight gain. The symptoms have been stable. Compliance with diet is variable. Compliance with exercise is variable. Compliance with medications is good.       Review of Systems   Constitution: Negative for weight gain.   Cardiovascular: Negative for chest pain, leg swelling and palpitations.   Respiratory: Negative for chest tightness and shortness of breath.    Musculoskeletal: Negative for muscle weakness.   Neurological: Negative for dizziness.     Family History   Problem Relation Age of Onset    Heart disease Mother     Heart disease Father     Heart disease Brother     Colon cancer Neg Hx      Past Medical History:   Diagnosis Date    Aortic stenosis     Asthma     BPH (benign prostatic hyperplasia)     CAD (coronary artery disease)     40% lesion 2002;lazo    Cirrhosis     Claudication 4/9/2014    Colon polyp     COPD (chronic obstructive pulmonary disease)     ED (erectile dysfunction)     Encounter for blood transfusion     Ex-smoker     Hearing loss NEC     Hepatitis C     Cured;reji brown    Hyperlipidemia     Hypertension     Hypothyroid     s/p tx graves     DELONTE on CPAP     Osteoarthritis     PVD (peripheral vascular disease)     Type 2 diabetes mellitus      Social History     Socioeconomic History    Marital status:      Spouse name: YAEL    Number of children: 2    Years of education: Not on file    Highest education level: Not on file   Occupational History    Not on file   Social Needs    Financial resource strain: Not on file    Food insecurity:     Worry: Not on file     Inability: Not on file    Transportation needs:     Medical: Not on file     Non-medical: Not on file   Tobacco Use    Smoking status: Former Smoker     Packs/day: 0.50     Years: 4.00     Pack years: 2.00     Last attempt to quit: 1972     Years since quittin.2    Smokeless tobacco: Former User     Types: Chew     Quit date: 1976   Substance and Sexual Activity    Alcohol use: Yes     Alcohol/week: 1.2 oz     Types: 2 Cans of beer per week     Comment: daily  No alcohol prior to surgery    Drug use: No    Sexual activity: Yes     Partners: Female   Lifestyle    Physical activity:     Days per week: Not on file     Minutes per session: Not on file    Stress: Not on file   Relationships    Social connections:     Talks on phone: Not on file     Gets together: Not on file     Attends Gnosticist service: Not on file     Active member of club or organization: Not on file     Attends meetings of clubs or organizations: Not on file     Relationship status: Not on file   Other Topics Concern    Not on file   Social History Narrative    . Lives with spouse. 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      Current Outpatient Medications on File Prior to Visit   Medication Sig Dispense Refill    ACCU-CHEK GRACE PLUS METER Misc AS DIRECTED  0    ACCU-CHEK GRACE PLUS TEST STRP Strp TEST THREE TIMES A  strip 11    albuterol-ipratropium (DUO-NEB) 2.5 mg-0.5 mg/3 mL nebulizer solution Take 3 mLs by nebulization 2  "(two) times daily. Rescue 120 vial 5    aspirin (ECOTRIN) 81 MG EC tablet Take 1 tablet (81 mg total) by mouth once daily.  0    azelastine (ASTELIN) 137 mcg (0.1 %) nasal spray 2 sprays (274 mcg total) by Nasal route 2 (two) times daily. 30 mL 6    budesonide-formoterol 160-4.5 mcg (SYMBICORT) 160-4.5 mcg/actuation HFAA INHALE 2 PUFFS INTO THE LUNGS TWO TIMES A DAY 10.2 g 11    docusate sodium (COLACE) 100 MG capsule Take 1 capsule (100 mg total) by mouth 2 (two) times daily. 60 capsule 0    finasteride (PROSCAR) 5 mg tablet Take 1 tablet (5 mg total) by mouth once daily. 30 tablet 11    furosemide (LASIX) 40 MG tablet Take 1 tablet (40 mg) by mouth 2 times a day and 1 extra if needed for 14 days. 90 tablet 5    GLUCOSAMINE HCL/CHONDR ROSARIO A NA (OSTEO BI-FLEX ORAL) Take 1 tablet by mouth 2 (two) times daily.       HUMALOG KWIKPEN INSULIN 100 unit/mL pen INJECT 3-4 UNITS INTO THE SKIN THREE TIMES DAILY BEFORE MEALS. 3 mL 11    insulin glargine (LANTUS SOLOSTAR) 100 unit/mL (3 mL) InPn pen Inject 40 Units into the skin once daily. 5 Syringe 6    krill oil 500 mg Cap Take by mouth.      lancets (ACCU-CHEK FASTCLIX LANCET DRUM) Misc TEST TWO TIMES A  each 5    levocetirizine (XYZAL) 5 MG tablet Take 1 tablet (5 mg total) by mouth every evening. 30 tablet 11    metoprolol succinate (TOPROL-XL) 50 MG 24 hr tablet Take 1 tablet (50 mg total) by mouth 2 (two) times daily. 60 tablet 11    milk thistle 175 mg tablet Take 175 mg by mouth once daily.      montelukast (SINGULAIR) 10 mg tablet Take 1 tablet (10 mg total) by mouth once daily. 30 tablet 11    MV,CA,IRON,MIN/FA/PHYTOSTEROL (CENTRUM SPECIALIST HEART ORAL) Take 1 tablet by mouth once daily.       pen needle, diabetic (BD INSULIN PEN NEEDLE UF MINI) 31 gauge x 3/16" Ndle USE AS DIRECTED 100 each 11    polyethylene glycol (GLYCOLAX) 17 gram/dose powder Take 17 g ( 1 capful) by mouth once daily. 527 g 0    RABEprazole (ACIPHEX) 20 mg tablet Take " 20 mg by mouth once daily.      sildenafil (REVATIO) 20 mg Tab Take 2 tables by mouth one hour prior to intercourse.  Max 2 per 24 hours 30 tablet 3    sildenafil (REVATIO) 20 mg Tab Take 2 tablets one hour prior to intercourse. Max two per 24 hours 90 tablet 11    SYNTHROID 137 mcg Tab tablet TAKE 2 TABLETS BY MOUTH BEFORE BREAKFAST. 60 tablet 5     Current Facility-Administered Medications on File Prior to Visit   Medication Dose Route Frequency Provider Last Rate Last Dose    lactated ringers infusion   Intravenous Continuous Omaira Theodore MD         Review of patient's allergies indicates:   Allergen Reactions    Lipitor [atorvastatin] Other (See Comments)     Muscle aches and pains as well as fatigue.     Niacin preparations Other (See Comments)     Causes broken blood vessels and bruises    Iodinated contrast- oral and iv dye Hives     Tachycardia    Omeprazole Hives and Itching    Prilosec [omeprazole magnesium] Hives and Itching       Objective:     Physical Exam    Assessment:     1. Essential hypertension    2. PVC (premature ventricular contraction)    3. Coronary artery disease involving native coronary artery of native heart without angina pectoris    4. PVD (peripheral vascular disease)    5. S/P CABG x 2    6. Mixed hyperlipidemia        Plan:     Essential hypertension    PVC (premature ventricular contraction)    Coronary artery disease involving native coronary artery of native heart without angina pectoris    PVD (peripheral vascular disease)    S/P CABG x 2    Mixed hyperlipidemia      Pt cleared for procedure at moderate CV risk  Continue metoprolol, lasix-htn  Continue asa- cad

## 2019-08-22 NOTE — TELEPHONE ENCOUNTER
Called patient and scheduled consultation for microwave ablation with Dr. Puentes on 8/29/19 at 0900

## 2019-08-29 DIAGNOSIS — C22.0 HCC (HEPATOCELLULAR CARCINOMA): Primary | ICD-10-CM

## 2019-09-05 ENCOUNTER — LAB VISIT (OUTPATIENT)
Dept: LAB | Facility: HOSPITAL | Age: 74
End: 2019-09-05
Attending: INTERNAL MEDICINE
Payer: MEDICARE

## 2019-09-05 DIAGNOSIS — D50.0 IRON DEFICIENCY ANEMIA DUE TO CHRONIC BLOOD LOSS: ICD-10-CM

## 2019-09-05 LAB
ALBUMIN SERPL BCP-MCNC: 3.9 G/DL (ref 3.5–5.2)
ALP SERPL-CCNC: 90 U/L (ref 55–135)
ALT SERPL W/O P-5'-P-CCNC: 28 U/L (ref 10–44)
ANION GAP SERPL CALC-SCNC: 12 MMOL/L (ref 8–16)
AST SERPL-CCNC: 28 U/L (ref 10–40)
BILIRUB SERPL-MCNC: 0.8 MG/DL (ref 0.1–1)
BUN SERPL-MCNC: 17 MG/DL (ref 8–23)
CALCIUM SERPL-MCNC: 9 MG/DL (ref 8.7–10.5)
CHLORIDE SERPL-SCNC: 101 MMOL/L (ref 95–110)
CO2 SERPL-SCNC: 28 MMOL/L (ref 23–29)
CREAT SERPL-MCNC: 1.1 MG/DL (ref 0.5–1.4)
CRP SERPL-MCNC: 2.5 MG/L (ref 0–8.2)
EST. GFR  (AFRICAN AMERICAN): >60 ML/MIN/1.73 M^2
EST. GFR  (NON AFRICAN AMERICAN): >60 ML/MIN/1.73 M^2
GLUCOSE SERPL-MCNC: 176 MG/DL (ref 70–110)
IRON SERPL-MCNC: 149 UG/DL (ref 45–160)
POTASSIUM SERPL-SCNC: 3.8 MMOL/L (ref 3.5–5.1)
PROT SERPL-MCNC: 7.8 G/DL (ref 6–8.4)
SATURATED IRON: 41 % (ref 20–50)
SODIUM SERPL-SCNC: 141 MMOL/L (ref 136–145)
TOTAL IRON BINDING CAPACITY: 360 UG/DL (ref 250–450)
TRANSFERRIN SERPL-MCNC: 243 MG/DL (ref 200–375)

## 2019-09-05 PROCEDURE — 86140 C-REACTIVE PROTEIN: CPT

## 2019-09-05 PROCEDURE — 82728 ASSAY OF FERRITIN: CPT

## 2019-09-05 PROCEDURE — 83540 ASSAY OF IRON: CPT

## 2019-09-05 PROCEDURE — 80053 COMPREHEN METABOLIC PANEL: CPT

## 2019-09-06 LAB — FERRITIN SERPL-MCNC: 224 NG/ML (ref 20–300)

## 2019-09-09 ENCOUNTER — HOSPITAL ENCOUNTER (OUTPATIENT)
Facility: HOSPITAL | Age: 74
Discharge: HOME | End: 2019-09-09
Attending: RADIOLOGY | Admitting: PATHOLOGY
Payer: MEDICARE

## 2019-09-09 ENCOUNTER — ANESTHESIA EVENT (OUTPATIENT)
Dept: SURGERY | Facility: HOSPITAL | Age: 74
End: 2019-09-09
Payer: MEDICARE

## 2019-09-09 ENCOUNTER — HOSPITAL ENCOUNTER (OUTPATIENT)
Dept: RADIOLOGY | Facility: HOSPITAL | Age: 74
Discharge: HOME | End: 2019-09-09
Attending: PHYSICIAN ASSISTANT
Payer: MEDICARE

## 2019-09-09 ENCOUNTER — ANESTHESIA (OUTPATIENT)
Dept: SURGERY | Facility: HOSPITAL | Age: 74
End: 2019-09-09
Payer: MEDICARE

## 2019-09-09 VITALS
HEART RATE: 79 BPM | HEIGHT: 68 IN | SYSTOLIC BLOOD PRESSURE: 197 MMHG | WEIGHT: 245 LBS | RESPIRATION RATE: 16 BRPM | DIASTOLIC BLOOD PRESSURE: 102 MMHG | OXYGEN SATURATION: 97 % | BODY MASS INDEX: 37.13 KG/M2

## 2019-09-09 DIAGNOSIS — C22.0 HCC (HEPATOCELLULAR CARCINOMA): ICD-10-CM

## 2019-09-09 DIAGNOSIS — K76.9 LIVER LESION: ICD-10-CM

## 2019-09-09 LAB — POCT GLUCOSE: 163 MG/DL (ref 70–110)

## 2019-09-09 PROCEDURE — 37000009 HC ANESTHESIA EA ADD 15 MINS

## 2019-09-09 PROCEDURE — 63600175 PHARM REV CODE 636 W HCPCS: Performed by: NURSE ANESTHETIST, CERTIFIED REGISTERED

## 2019-09-09 PROCEDURE — 47382 PERCUT ABLATE LIVER RF: CPT | Mod: PBBFAC

## 2019-09-09 PROCEDURE — 25500020 PHARM REV CODE 255: Performed by: PHYSICIAN ASSISTANT

## 2019-09-09 PROCEDURE — 63600175 PHARM REV CODE 636 W HCPCS: Performed by: ANESTHESIOLOGY

## 2019-09-09 PROCEDURE — 25000003 PHARM REV CODE 250: Performed by: NURSE ANESTHETIST, CERTIFIED REGISTERED

## 2019-09-09 PROCEDURE — 77013 CT GUIDE FOR TISSUE ABLATION: CPT | Mod: TC

## 2019-09-09 PROCEDURE — 71000033 HC RECOVERY, INTIAL HOUR

## 2019-09-09 PROCEDURE — 37000008 HC ANESTHESIA 1ST 15 MINUTES

## 2019-09-09 PROCEDURE — 25000003 PHARM REV CODE 250: Performed by: ANESTHESIOLOGY

## 2019-09-09 PROCEDURE — 71000039 HC RECOVERY, EACH ADD'L HOUR

## 2019-09-09 RX ORDER — ONDANSETRON 2 MG/ML
4 INJECTION INTRAMUSCULAR; INTRAVENOUS DAILY PRN
Status: DISCONTINUED | OUTPATIENT
Start: 2019-09-09 | End: 2019-09-09 | Stop reason: HOSPADM

## 2019-09-09 RX ORDER — ROCURONIUM BROMIDE 10 MG/ML
INJECTION, SOLUTION INTRAVENOUS
Status: DISCONTINUED | OUTPATIENT
Start: 2019-09-09 | End: 2019-09-09

## 2019-09-09 RX ORDER — FENTANYL CITRATE 50 UG/ML
25 INJECTION, SOLUTION INTRAMUSCULAR; INTRAVENOUS EVERY 5 MIN PRN
Status: COMPLETED | OUTPATIENT
Start: 2019-09-09 | End: 2019-09-09

## 2019-09-09 RX ORDER — DIPHENHYDRAMINE HYDROCHLORIDE 50 MG/ML
50 INJECTION INTRAMUSCULAR; INTRAVENOUS ONCE
Status: DISCONTINUED | OUTPATIENT
Start: 2019-09-09 | End: 2019-09-09 | Stop reason: HOSPADM

## 2019-09-09 RX ORDER — DIPHENHYDRAMINE HYDROCHLORIDE 50 MG/ML
50 INJECTION INTRAMUSCULAR; INTRAVENOUS ONCE
Status: CANCELLED | OUTPATIENT
Start: 2019-09-09

## 2019-09-09 RX ORDER — FENTANYL CITRATE 50 UG/ML
INJECTION, SOLUTION INTRAMUSCULAR; INTRAVENOUS
Status: DISCONTINUED | OUTPATIENT
Start: 2019-09-09 | End: 2019-09-09

## 2019-09-09 RX ORDER — PROPOFOL 10 MG/ML
VIAL (ML) INTRAVENOUS
Status: DISCONTINUED | OUTPATIENT
Start: 2019-09-09 | End: 2019-09-09

## 2019-09-09 RX ORDER — LIDOCAINE HYDROCHLORIDE 10 MG/ML
INJECTION, SOLUTION EPIDURAL; INFILTRATION; INTRACAUDAL; PERINEURAL
Status: DISCONTINUED | OUTPATIENT
Start: 2019-09-09 | End: 2019-09-09

## 2019-09-09 RX ORDER — DIPHENHYDRAMINE HYDROCHLORIDE 50 MG/ML
INJECTION INTRAMUSCULAR; INTRAVENOUS
Status: DISCONTINUED | OUTPATIENT
Start: 2019-09-09 | End: 2019-09-09

## 2019-09-09 RX ORDER — ONDANSETRON 2 MG/ML
INJECTION INTRAMUSCULAR; INTRAVENOUS
Status: DISCONTINUED | OUTPATIENT
Start: 2019-09-09 | End: 2019-09-09

## 2019-09-09 RX ORDER — HYDROCODONE BITARTRATE AND ACETAMINOPHEN 7.5; 325 MG/1; MG/1
1 TABLET ORAL EVERY 6 HOURS PRN
Status: DISCONTINUED | OUTPATIENT
Start: 2019-09-09 | End: 2019-09-09 | Stop reason: HOSPADM

## 2019-09-09 RX ORDER — MIDAZOLAM HYDROCHLORIDE 1 MG/ML
INJECTION, SOLUTION INTRAMUSCULAR; INTRAVENOUS
Status: DISCONTINUED | OUTPATIENT
Start: 2019-09-09 | End: 2019-09-09

## 2019-09-09 RX ORDER — OXYCODONE HYDROCHLORIDE 5 MG/1
5 TABLET ORAL ONCE
Status: COMPLETED | OUTPATIENT
Start: 2019-09-09 | End: 2019-09-09

## 2019-09-09 RX ORDER — CIPROFLOXACIN 500 MG/1
500 TABLET ORAL 2 TIMES DAILY
Qty: 20 TABLET | Refills: 0 | Status: SHIPPED | OUTPATIENT
Start: 2019-09-09 | End: 2019-09-19

## 2019-09-09 RX ADMIN — FENTANYL CITRATE 100 MCG: 50 INJECTION, SOLUTION INTRAMUSCULAR; INTRAVENOUS at 11:09

## 2019-09-09 RX ADMIN — ONDANSETRON 4 MG: 2 INJECTION, SOLUTION INTRAMUSCULAR; INTRAVENOUS at 12:09

## 2019-09-09 RX ADMIN — FENTANYL CITRATE 25 MCG: 50 INJECTION INTRAMUSCULAR; INTRAVENOUS at 01:09

## 2019-09-09 RX ADMIN — DIPHENHYDRAMINE HYDROCHLORIDE 50 MG: 50 INJECTION INTRAMUSCULAR; INTRAVENOUS at 10:09

## 2019-09-09 RX ADMIN — IOHEXOL 100 ML: 350 INJECTION, SOLUTION INTRAVENOUS at 11:09

## 2019-09-09 RX ADMIN — LIDOCAINE HYDROCHLORIDE 60 MG: 10 INJECTION, SOLUTION EPIDURAL; INFILTRATION; INTRACAUDAL; PERINEURAL at 11:09

## 2019-09-09 RX ADMIN — PROPOFOL 100 MG: 10 INJECTION, EMULSION INTRAVENOUS at 11:09

## 2019-09-09 RX ADMIN — SODIUM CHLORIDE, SODIUM LACTATE, POTASSIUM CHLORIDE, AND CALCIUM CHLORIDE: 600; 310; 30; 20 INJECTION, SOLUTION INTRAVENOUS at 11:09

## 2019-09-09 RX ADMIN — MIDAZOLAM 2 MG: 1 INJECTION INTRAMUSCULAR; INTRAVENOUS at 11:09

## 2019-09-09 RX ADMIN — ROCURONIUM BROMIDE 30 MG: 10 INJECTION, SOLUTION INTRAVENOUS at 11:09

## 2019-09-09 RX ADMIN — OXYCODONE HYDROCHLORIDE 5 MG: 5 TABLET ORAL at 01:09

## 2019-09-09 NOTE — SEDATION DOCUMENTATION
Procedure complete, pt extubated per CRNA.  Pt transferred to surgery recovery on stretcher supine. Report given to Shanique.

## 2019-09-09 NOTE — DISCHARGE SUMMARY
Radiology Discharge Summary      Hospital Course: No complications    Admit Date: 9/9/2019  Discharge Date: 09/09/2019     Instructions Given to Patient: Yes  Diet: regular diet and Resume prior diet  Activity: activity as tolerated and no driving for today    Description of Condition on Discharge: Stable  Vital Signs (Most Recent): Pulse: 79 (09/09/19 1052)  Resp: 16 (09/09/19 1052)  BP: (!) 197/102 (09/09/19 1052)  SpO2: 97 % (09/09/19 1052)    Discharge Disposition: Home    Discharge Diagnosis: hcc     Follow-up: triple phase mri in 1 month    Red Puentes MD  Staff Radiologist  Department of Radiology  Pager: [phone]

## 2019-09-09 NOTE — ANESTHESIA POSTPROCEDURE EVALUATION
Anesthesia Post Evaluation    Patient: Erendira Pretty    Procedure(s) Performed: Procedure(s) (LRB):  CT (COMPUTED TOMOGRAPHY) (N/A)    Final Anesthesia Type: general  Patient location during evaluation: PACU  Patient participation: Yes- Able to Participate  Level of consciousness: awake and alert  Post-procedure vital signs: reviewed and stable  Pain management: adequate  Airway patency: patent  PONV status at discharge: No PONV  Anesthetic complications: no      Cardiovascular status: hemodynamically stable  Respiratory status: spontaneous ventilation  Hydration status: euvolemic  Follow-up not needed.          Vitals Value Taken Time   /62 9/9/2019  2:01 PM   Temp  9/9/2019  3:13 PM   Pulse 69 9/9/2019  2:02 PM   Resp 19 9/9/2019  2:02 PM   SpO2 98 % 9/9/2019  2:02 PM   Vitals shown include unvalidated device data.      Event Time     Out of Recovery 14:05:34          Pain/Natalie Score: Pain Rating Prior to Med Admin: 4 (9/9/2019  1:50 PM)  Natalie Score: 10 (9/9/2019  1:45 PM)

## 2019-09-09 NOTE — PROCEDURES
Radiology Post-Procedure Note    Pre Op Diagnosis: hcc  Post Op Diagnosis: Same    Procedure: microwave ablation    Procedure performed by: Dr Red Puentes    Written Informed Consent Obtained: Yes  Specimen Removed: NO  Estimated Blood Loss: Minimal    Findings:   No complications.     Patient tolerated procedure well.    Red Puentes MD  Staff Radiologist  Department of Radiology  Pager: [phone]

## 2019-09-09 NOTE — TRANSFER OF CARE
Anesthesia Transfer of Care Note    Patient: Erendira Pretty    Procedure(s) Performed: Procedure(s) (LRB):  CT (COMPUTED TOMOGRAPHY) (N/A)    Patient location: PACU    Anesthesia Type: general    Transport from OR: Transported from OR on room air with adequate spontaneous ventilation    Post pain: adequate analgesia    Post assessment: no apparent anesthetic complications    Post vital signs: stable    Level of consciousness: awake, alert and oriented    Nausea/Vomiting: no nausea/vomiting    Complications: none    Transfer of care protocol was followed      Last vitals: There were no vitals taken for this visit.  
scd's

## 2019-09-09 NOTE — ANESTHESIA PREPROCEDURE EVALUATION
09/09/2019  Erendira Pretty is a 74 y.o., male.    Pre-op Assessment    I have reviewed the Patient Summary Reports.      I have reviewed the Medications.     Review of Systems  Anesthesia Hx:  No problems with previous Anesthesia  Denies Family Hx of Anesthesia complications.   Denies Personal Hx of Anesthesia complications.   Hematology/Oncology:         -- Anemia:   Cardiovascular:   Hypertension, well controlled Valvular problems/Murmurs, AS CAD  CABG/stent  Angina PVD hyperlipidemia ECG has been reviewed. Sinus rhythm with 1st degree A-V block with Premature atrial complexes  Minimal voltage criteria for LVH, may be normal variant  Borderline Abnormal ECG  When compared with ECG of 17-APR-2019 09:46,  Premature atrial complexes are now Present  Confirmed by JOSÉ MANUEL BLANCO MD ([id number]) on 6/4/2019 7:04:04 PM    ECHO 12-13-18  CONCLUSIONS     1 - Concentric hypertrophy.     2 - No wall motion abnormalities.     3 - Normal left ventricular systolic function (EF 60-65%).     4 - Indeterminate LV diastolic function.     5 - Normal right ventricular systolic function .     6 - The estimated PA systolic pressure is greater than 33 mmHg.     7 - Mild tricuspid regurgitation.   Poor imaging quality.    ECHO 10-13-18  CONCLUSIONS     1 - Normal left ventricular systolic function (EF 55-60%).     2 - Normal left ventricular diastolic function.     3 - Normal right ventricular systolic function .     4 - No wall motion abnormalities.     5 - Concentric hypertrophy.     6 - The estimated PA systolic pressure is greater than 27 mmHg.     7 - Mild tricuspid regurgitation.     8 - Mild pulmonic regurgitation.     9 - Mild to moderate aortic stenosis, RADHA = 1.0 cm2, AVAi = 0.49 cm2/m2, peak velocity = 2.95 m/s, mean gradient = 19 mmHg.     10 - Enlarged ascending aorta.  Coronary Artery Disease:  Hypertension    Pulmonary:    COPD, mild Asthma mild Sleep Apnea, CPAP  Asthma:  Chronic Obstructive Pulmonary Disease (COPD):  Obstructive Sleep Apnea (DELONTE).   Hepatic/GI:   GERD Liver Disease, Hepatitis, C Cirrhosis Liver Disease, Hepatitis, chronic    Endocrine:   Diabetes, well controlled, type 2 Hypothyroidism  Diabetes                                                                                                                 09/09/2019  Erendira Pretty is a 74 y.o., male.    Anesthesia Evaluation    I have reviewed the Patient Summary Reports.    I have reviewed the Nursing Notes.      Review of Systems  Anesthesia Hx:  Denies Family Hx of Anesthesia complications.   Denies Personal Hx of Anesthesia complications.   Social:  Former Smoker    Cardiovascular:   Hypertension Valvular problems/Murmurs (Mild -mod AS on echo RADHA 1 cmsq, MG 19 mmhg), AS CAD   Angina PVD MARCUS NYHA Classification II ECG has been reviewed.    Pulmonary:   Shortness of breath Sleep Apnea, CPAP    Hepatic/GI:   Hepatitis (cured by harvoni in 2015), C    Musculoskeletal:   Arthritis     Neurological:  Neurology Normal    Endocrine:   Diabetes Hypothyroidism        Physical Exam  General:  Obesity    Airway/Jaw/Neck:  Airway Findings: Mouth Opening: Normal Mallampati: II      Dental:  DENTAL FINDINGS: Normal   Chest/Lungs:  Chest/Lungs Findings: Normal Respiratory Rate     Heart/Vascular:  Heart Findings: Normal            Anesthesia Plan  Type of Anesthesia, risks & benefits discussed:  Anesthesia Type:  general  Patient's Preference:   Intra-op Monitoring Plan: standard ASA monitors  Intra-op Monitoring Plan Comments:   Post Op Pain Control Plan: multimodal analgesia  Post Op Pain Control Plan Comments:   Induction:    Beta Blocker:  Patient is on a Beta-Blocker and has received one dose within the past 24 hours (No further documentation required).       Informed Consent: Patient understands risks and agrees with Anesthesia plan.  Questions answered. Anesthesia  consent signed with patient.  ASA Score: 2     Day of Surgery Review of History & Physical: I have interviewed and examined the patient. I have reviewed the patient's H&P dated:  There are no significant changes.      Anesthesia Plan Notes: Pt was seen before surgery yesterday.  Anesthesia risks and benefits discussed with him.  This is a late entry note but eval and exam happened on 3/27/19.        Ready For Surgery From Anesthesia Perspective.                                                                                                                  09/09/2019  Erendira Pretty is a 74 y.o., male.    Anesthesia Evaluation    I have reviewed the Patient Summary Reports.    I have reviewed the Nursing Notes.      Review of Systems  Anesthesia Hx:  Denies Family Hx of Anesthesia complications.   Denies Personal Hx of Anesthesia complications.   Social:  Former Smoker    Cardiovascular:   Hypertension Valvular problems/Murmurs (Mild -mod AS on echo RADHA 1 cmsq, MG 19 mmhg), AS CAD   Angina PVD MARCUS NYHA Classification II ECG has been reviewed.    Pulmonary:   Shortness of breath Sleep Apnea, CPAP    Hepatic/GI:   Hepatitis (cured by harvoni in 2015), C    Musculoskeletal:   Arthritis     Neurological:  Neurology Normal    Endocrine:   Diabetes Hypothyroidism        Physical Exam  General:  Obesity    Airway/Jaw/Neck:  Airway Findings: Mouth Opening: Normal Mallampati: II      Dental:  DENTAL FINDINGS: Normal   Chest/Lungs:  Chest/Lungs Findings: Normal Respiratory Rate     Heart/Vascular:  Heart Findings: Normal            Anesthesia Plan  Type of Anesthesia, risks & benefits discussed:  Anesthesia Type:  general  Patient's Preference:   Intra-op Monitoring Plan: standard ASA monitors  Intra-op Monitoring Plan Comments:   Post Op Pain Control Plan: multimodal analgesia  Post Op Pain Control Plan Comments:   Induction:    Beta Blocker:  Patient is on a Beta-Blocker and has received one dose within the past 24 hours  (No further documentation required).       Informed Consent: Patient understands risks and agrees with Anesthesia plan.  Questions answered. Anesthesia consent signed with patient.  ASA Score: 2     Day of Surgery Review of History & Physical: I have interviewed and examined the patient. I have reviewed the patient's H&P dated:  There are no significant changes.      Anesthesia Plan Notes: Pt was seen before surgery yesterday.  Anesthesia risks and benefits discussed with him.  This is a late entry note but eval and exam happened on 3/27/19.        Ready For Surgery From Anesthesia Perspective.         Physical Exam  General:  Morbid Obesity    Airway/Jaw/Neck:  Airway Findings: Mouth Opening: Normal Tongue: Normal  General Airway Assessment: Adult  Mallampati: III                 Anesthesia Plan  Type of Anesthesia, risks & benefits discussed:  Anesthesia Type:  general  Patient's Preference:   Intra-op Monitoring Plan: standard ASA monitors  Intra-op Monitoring Plan Comments:   Post Op Pain Control Plan: multimodal analgesia  Post Op Pain Control Plan Comments:   Induction:   IV  Beta Blocker:  Patient is on a Beta-Blocker and has received one dose within the past 24 hours (No further documentation required).       Informed Consent: Patient understands risks and agrees with Anesthesia plan.  Questions answered. Anesthesia consent signed with patient.  ASA Score: 3     Day of Surgery Review of History & Physical: I have interviewed and examined the patient. I have reviewed the patient's H&P dated:  There are no significant changes. Significant changes noted: Surgeon notified.  H&P update referred to the surgeon.         Ready For Surgery From Anesthesia Perspective.

## 2019-09-11 ENCOUNTER — TELEPHONE (OUTPATIENT)
Dept: GASTROENTEROLOGY | Facility: CLINIC | Age: 74
End: 2019-09-11

## 2019-09-11 ENCOUNTER — TELEPHONE (OUTPATIENT)
Dept: RADIOLOGY | Facility: HOSPITAL | Age: 74
End: 2019-09-11

## 2019-09-11 DIAGNOSIS — C22.0 HCC (HEPATOCELLULAR CARCINOMA): ICD-10-CM

## 2019-09-11 DIAGNOSIS — R10.11 RUQ PAIN: Primary | ICD-10-CM

## 2019-09-11 RX ORDER — OXYCODONE HCL 10 MG/1
10 TABLET, FILM COATED, EXTENDED RELEASE ORAL EVERY 12 HOURS PRN
Qty: 14 TABLET | Refills: 0 | Status: CANCELLED | OUTPATIENT
Start: 2019-09-11 | End: 2019-09-18

## 2019-09-11 RX ORDER — HYDROCODONE BITARTRATE AND ACETAMINOPHEN 10; 325 MG/1; MG/1
1 TABLET ORAL EVERY 4 HOURS PRN
Qty: 28 TABLET | Refills: 0 | Status: SHIPPED | OUTPATIENT
Start: 2019-09-11 | End: 2020-03-09

## 2019-09-11 NOTE — PROGRESS NOTES
Spoke with Dr. Puentes who reports patient having pain and constipation after ablation. Dr. Puentes is not available at this time. Office has been in touch with patient who continues to have pain and constipation. Will prescribe narcotic for pain and recommend stool regimen with dulcolax 10mg daily for 3 days, miralax 17g daily, can take Dulcolax suppository if needed. If patient continues to have abdominal pain despite oral pain medication then he needs to go to the ED.

## 2019-09-11 NOTE — TELEPHONE ENCOUNTER
----- Message from Evelina Dickey sent at 9/11/2019 12:02 PM CDT -----  Contact: spouse  She's calling in regards to pain medication being called in       ,pls call pt back at [phone] (home)         [facility] [address] [facility] [address]  [address] [address] [address]  Phone: [phone] Fax: [phone]

## 2019-09-12 ENCOUNTER — OFFICE VISIT (OUTPATIENT)
Dept: HEMATOLOGY/ONCOLOGY | Facility: CLINIC | Age: 74
End: 2019-09-12
Payer: MEDICARE

## 2019-09-12 VITALS
DIASTOLIC BLOOD PRESSURE: 80 MMHG | BODY MASS INDEX: 37.89 KG/M2 | TEMPERATURE: 98 F | WEIGHT: 250 LBS | SYSTOLIC BLOOD PRESSURE: 159 MMHG | OXYGEN SATURATION: 95 % | HEIGHT: 68 IN | HEART RATE: 76 BPM

## 2019-09-12 DIAGNOSIS — C22.0 HCC (HEPATOCELLULAR CARCINOMA): ICD-10-CM

## 2019-09-12 DIAGNOSIS — Z86.2 HISTORY OF IRON DEFICIENCY ANEMIA: Primary | ICD-10-CM

## 2019-09-12 DIAGNOSIS — K74.60 CIRRHOSIS OF LIVER WITHOUT ASCITES, UNSPECIFIED HEPATIC CIRRHOSIS TYPE: ICD-10-CM

## 2019-09-12 PROCEDURE — 99214 OFFICE O/P EST MOD 30 MIN: CPT | Mod: S$GLB,,, | Performed by: INTERNAL MEDICINE

## 2019-09-12 PROCEDURE — 3079F DIAST BP 80-89 MM HG: CPT | Mod: CPTII,S$GLB,, | Performed by: INTERNAL MEDICINE

## 2019-09-12 PROCEDURE — 1101F PT FALLS ASSESS-DOCD LE1/YR: CPT | Mod: CPTII,S$GLB,, | Performed by: INTERNAL MEDICINE

## 2019-09-12 PROCEDURE — 1101F PR PT FALLS ASSESS DOC 0-1 FALLS W/OUT INJ PAST YR: ICD-10-PCS | Mod: CPTII,S$GLB,, | Performed by: INTERNAL MEDICINE

## 2019-09-12 PROCEDURE — 99214 PR OFFICE/OUTPT VISIT, EST, LEVL IV, 30-39 MIN: ICD-10-PCS | Mod: S$GLB,,, | Performed by: INTERNAL MEDICINE

## 2019-09-12 PROCEDURE — 99999 PR PBB SHADOW E&M-EST. PATIENT-LVL IV: ICD-10-PCS | Mod: PBBFAC,,, | Performed by: INTERNAL MEDICINE

## 2019-09-12 PROCEDURE — 3077F PR MOST RECENT SYSTOLIC BLOOD PRESSURE >= 140 MM HG: ICD-10-PCS | Mod: CPTII,S$GLB,, | Performed by: INTERNAL MEDICINE

## 2019-09-12 PROCEDURE — 3077F SYST BP >= 140 MM HG: CPT | Mod: CPTII,S$GLB,, | Performed by: INTERNAL MEDICINE

## 2019-09-12 PROCEDURE — 99999 PR PBB SHADOW E&M-EST. PATIENT-LVL IV: CPT | Mod: PBBFAC,,, | Performed by: INTERNAL MEDICINE

## 2019-09-12 PROCEDURE — 3079F PR MOST RECENT DIASTOLIC BLOOD PRESSURE 80-89 MM HG: ICD-10-PCS | Mod: CPTII,S$GLB,, | Performed by: INTERNAL MEDICINE

## 2019-09-12 NOTE — PROGRESS NOTES
Subjective:      Patient ID: Erendira Pretty is a 74 y.o. male.    Chief Complaint: Follow-up    The patient is a 74-year-old  male who presents to the Hematology Oncology Clinic today to discuss further evaluation management recommendations for iron deficiency anemia.  The patient has been referred to me by Dr. Frank Gallardo with Cardiology.  I have reviewed all the patient's relevant clinical history available in the medical record and have utilized this in my evaluation and management recommendations today.  Today the patient reports that he continues to recover with regard to his abdominal pain from his recent microwave ablation for HCC in the right lobe of the liver.  He is taking his pain medication as recommended with moderate relief.  He has been having bowel movements.  He denies any fevers, chills or night sweats.  He denies any loss of appetite or unintentional weight loss.  He denies any melena, hematochezia, hematemesis, hemoptysis or hematuria.  He denies any nausea, vomiting or abdominal pain. He denies any bowel or urinary complaints.  Of note the patient's recent clinical history significant for CABG in November 2018.  The patient is also most recently been treated with microwave ablation for HCC in the right lobe of the liver.    Follow-up   Associated symptoms include abdominal pain. Pertinent negatives include no arthralgias, chest pain, chills, congestion, coughing, diaphoresis, fatigue, fever, headaches, joint swelling, myalgias, nausea, rash, sore throat, vomiting or weakness.     Review of Systems   Constitutional: Negative for activity change, appetite change, chills, diaphoresis, fatigue, fever and unexpected weight change.   HENT: Negative for congestion, dental problem, ear pain, mouth sores, nosebleeds, postnasal drip, sinus pressure, sore throat, tinnitus, trouble swallowing and voice change.    Eyes: Negative for photophobia, pain, discharge, redness, itching and visual  disturbance.   Respiratory: Negative for cough, chest tightness, shortness of breath, wheezing and stridor.    Cardiovascular: Negative for chest pain, palpitations and leg swelling.   Gastrointestinal: Positive for abdominal pain. Negative for abdominal distention, anal bleeding, blood in stool, constipation, diarrhea, nausea and vomiting.   Endocrine: Negative for cold intolerance, heat intolerance, polydipsia, polyphagia and polyuria.   Genitourinary: Negative for decreased urine volume, difficulty urinating, dysuria, flank pain, frequency, hematuria and urgency.   Musculoskeletal: Negative for arthralgias, back pain, gait problem, joint swelling and myalgias.   Skin: Negative for pallor and rash.   Allergic/Immunologic: Negative for immunocompromised state.   Neurological: Negative for dizziness, syncope, weakness, light-headedness and headaches.   Hematological: Negative for adenopathy. Does not bruise/bleed easily.   Psychiatric/Behavioral: Negative for agitation and confusion. The patient is not nervous/anxious.        Medication List with Changes/Refills   Current Medications    ACCU-CHEK GRACE PLUS METER MISC    AS DIRECTED    ACCU-CHEK GRACE PLUS TEST STRP STRP    TEST THREE TIMES A DAY    ALBUTEROL-IPRATROPIUM (DUO-NEB) 2.5 MG-0.5 MG/3 ML NEBULIZER SOLUTION    Take 3 mLs by nebulization 2 (two) times daily. Rescue    ASPIRIN (ECOTRIN) 81 MG EC TABLET    Take 1 tablet (81 mg total) by mouth once daily.    AZELASTINE (ASTELIN) 137 MCG (0.1 %) NASAL SPRAY    2 sprays (274 mcg total) by Nasal route 2 (two) times daily.    BUDESONIDE-FORMOTEROL 160-4.5 MCG (SYMBICORT) 160-4.5 MCG/ACTUATION [facility]    INHALE 2 PUFFS INTO THE LUNGS TWO TIMES A DAY    CIPROFLOXACIN HCL (CIPRO) 500 MG TABLET    Take 1 tablet (500 mg total) by mouth 2 (two) times daily. for 10 days    DOCUSATE SODIUM (COLACE) 100 MG CAPSULE    Take 1 capsule (100 mg total) by mouth 2 (two) times daily.    FINASTERIDE (PROSCAR) 5 MG TABLET    Take 1  "tablet (5 mg total) by mouth once daily.    FUROSEMIDE (LASIX) 40 MG TABLET    Take 1 tablet (40 mg) by mouth 2 times a day and 1 extra if needed for 14 days.    GLUCOSAMINE HCL/CHONDR ROSARIO A NA (OSTEO BI-FLEX ORAL)    Take 1 tablet by mouth 2 (two) times daily.     HUMALOG KWIKPEN INSULIN 100 UNIT/ML PEN    INJECT 3-4 UNITS INTO THE SKIN THREE TIMES DAILY BEFORE MEALS.    HYDROCODONE-ACETAMINOPHEN (NORCO)  MG PER TABLET    Take 1 tablet by mouth every 4 (four) hours as needed for Pain.    INSULIN GLARGINE (LANTUS SOLOSTAR) 100 UNIT/ML (3 ML) INPN PEN    Inject 40 Units into the skin once daily.    KRILL  MG CAP    Take by mouth.    LANCETS (ACCU-CHEK FASTCLIX LANCET DRUM) MISC    TEST TWO TIMES A DAY    LEVOCETIRIZINE (XYZAL) 5 MG TABLET    Take 1 tablet (5 mg total) by mouth every evening.    METOPROLOL SUCCINATE (TOPROL-XL) 50 MG 24 HR TABLET    Take 1 tablet (50 mg total) by mouth 2 (two) times daily.    MILK THISTLE 175 MG TABLET    Take 175 mg by mouth once daily.    MONTELUKAST (SINGULAIR) 10 MG TABLET    Take 1 tablet (10 mg total) by mouth once daily.    MV,CA,IRON,MIN/FA/PHYTOSTEROL ([facility] HEART ORAL)    Take 1 tablet by mouth once daily.     PEN NEEDLE, DIABETIC (BD INSULIN PEN NEEDLE UF MINI) 31 GAUGE X 3/16" NDLE    USE AS DIRECTED    POLYETHYLENE GLYCOL (GLYCOLAX) 17 GRAM/DOSE POWDER    Take 17 g ( 1 capful) by mouth once daily.    RABEPRAZOLE (ACIPHEX) 20 MG TABLET    Take 20 mg by mouth once daily.    SILDENAFIL (REVATIO) 20 MG TAB    Take 2 tables by mouth one hour prior to intercourse.  Max 2 per 24 hours    SILDENAFIL (REVATIO) 20 MG TAB    Take 2 tablets one hour prior to intercourse. Max two per 24 hours    SYNTHROID 137 MCG TAB TABLET    TAKE 2 TABLETS BY MOUTH BEFORE BREAKFAST.     Review of patient's allergies indicates:   Allergen Reactions    Lipitor [atorvastatin] Other (See Comments)     Muscle aches and pains as well as fatigue.     Niacin preparations Other " (See Comments)     Causes broken blood vessels and bruises    Iodinated contrast media Hives     Tachycardia    Omeprazole Hives and Itching    Prilosec [omeprazole magnesium] Hives and Itching       Lab: I have reviewed all of the patient's relevant lab work available in the medical record and have utilized this in my evaluation and management recommendations today    Imaging: I have reviewed all of the patient's diagnostic/imaging results available in the medical record and have utilized this in my evaluation and management recommendations today.      Objective:     Vitals:    09/12/19 1340   BP: (!) 159/80   Pulse: 76   Temp: 98.4 °F (36.9 °C)       Physical Exam   Constitutional: He is oriented to person, place, and time. He appears well-developed and well-nourished. No distress.   HENT:   Head: Normocephalic and atraumatic.   Nose: Nose normal.   Mouth/Throat: Oropharynx is clear and moist. No oropharyngeal exudate.   Eyes: Pupils are equal, round, and reactive to light. EOM are normal. No scleral icterus.   Neck: Normal range of motion. Neck supple. No tracheal deviation present. No thyromegaly present.   Cardiovascular: Normal rate, regular rhythm, normal heart sounds and intact distal pulses.   No murmur heard.  Pulmonary/Chest: Effort normal and breath sounds normal. No stridor. No respiratory distress. He has no wheezes. He has no rales. He exhibits no tenderness.   Abdominal: Soft. Bowel sounds are normal. He exhibits no distension and no mass. There is no tenderness. There is no rebound and no guarding.   Musculoskeletal: Normal range of motion. He exhibits no edema or tenderness.   Lymphadenopathy:     He has no cervical adenopathy.   Neurological: He is alert and oriented to person, place, and time. Coordination normal.   Skin: Skin is warm. No rash noted. He is not diaphoretic. No erythema.   Psychiatric: He has a normal mood and affect. His behavior is normal. Judgment and thought content normal.    Nursing note and vitals reviewed.      Assessment:     1. History of iron deficiency anemia    2. Cirrhosis of liver without ascites, unspecified hepatic cirrhosis type    3. HCC (hepatocellular carcinoma)        Plan:   1.  I had a detailed discussion with the patient today with regard to his current clinical situation.  Review of the patient's recent lab work shows interval resolution of iron deficiency after completion of IV iron infusions.  No indication for additional IV iron supplementation at this time.  2.  He knows to seek immediate attention for any signs/symptoms of GI/ bleeding.  3.  He will continue follow-up with hepatology for surveillance for HCC as recommended.    Follow-up in 6 months with labs 1-2 days before clinic visit.  He knows to call sooner if any new problems or questions.   History of iron deficiency anemia  -     CBC auto differential; Future; Expected date: 09/12/2019  -     CMP; Future; Expected date: 09/12/2019  -     Iron and TIBC; Future; Expected date: 09/12/2019  -     C-REACTIVE PROTEIN; Future; Expected date: 09/12/2019  -     Ferritin; Future; Expected date: 09/12/2019    Cirrhosis of liver without ascites, unspecified hepatic cirrhosis type  -     CBC auto differential; Future; Expected date: 09/12/2019  -     CMP; Future; Expected date: 09/12/2019  -     Iron and TIBC; Future; Expected date: 09/12/2019  -     C-REACTIVE PROTEIN; Future; Expected date: 09/12/2019  -     Ferritin; Future; Expected date: 09/12/2019    HCC (hepatocellular carcinoma)  -     CBC auto differential; Future; Expected date: 09/12/2019  -     CMP; Future; Expected date: 09/12/2019  -     Iron and TIBC; Future; Expected date: 09/12/2019  -     C-REACTIVE PROTEIN; Future; Expected date: 09/12/2019  -     Ferritin; Future; Expected date: 09/12/2019

## 2019-09-13 VITALS
RESPIRATION RATE: 16 BRPM | HEART RATE: 75 BPM | OXYGEN SATURATION: 98 % | SYSTOLIC BLOOD PRESSURE: 138 MMHG | TEMPERATURE: 98 F | DIASTOLIC BLOOD PRESSURE: 80 MMHG

## 2019-09-17 ENCOUNTER — OFFICE VISIT (OUTPATIENT)
Dept: OPHTHALMOLOGY | Facility: CLINIC | Age: 74
End: 2019-09-17
Payer: MEDICARE

## 2019-09-17 DIAGNOSIS — Z79.4 TYPE 2 DIABETES MELLITUS WITHOUT COMPLICATION, WITH LONG-TERM CURRENT USE OF INSULIN: Primary | ICD-10-CM

## 2019-09-17 DIAGNOSIS — H35.373 EPIRETINAL MEMBRANE (ERM) OF BOTH EYES: ICD-10-CM

## 2019-09-17 DIAGNOSIS — E11.9 TYPE 2 DIABETES MELLITUS WITHOUT COMPLICATION, WITH LONG-TERM CURRENT USE OF INSULIN: Primary | ICD-10-CM

## 2019-09-17 PROCEDURE — 99999 PR PBB SHADOW E&M-EST. PATIENT-LVL II: CPT | Mod: PBBFAC,,, | Performed by: OPHTHALMOLOGY

## 2019-09-17 PROCEDURE — 99999 PR PBB SHADOW E&M-EST. PATIENT-LVL II: ICD-10-PCS | Mod: PBBFAC,,, | Performed by: OPHTHALMOLOGY

## 2019-09-17 PROCEDURE — 92014 PR EYE EXAM, EST PATIENT,COMPREHESV: ICD-10-PCS | Mod: S$GLB,,, | Performed by: OPHTHALMOLOGY

## 2019-09-17 PROCEDURE — 92014 COMPRE OPH EXAM EST PT 1/>: CPT | Mod: S$GLB,,, | Performed by: OPHTHALMOLOGY

## 2019-09-17 NOTE — PROGRESS NOTES
===============================  Erendira Pretty,   74 y.o. male   Last visit [facility]: :9/14/2018   Last visit eye dept. Visit date not found  [facility]:  Uncorrected distance visual acuity was 20/20 in the right eye and 20/20 in the left eye.  Tonometry     Tonometry (Applanation, 7:45 AM)       Right Left    Pressure 18 18               Not recorded         Not recorded         Not recorded        Chief Complaint   Patient presents with    Diabetic Eye Exam     here for 1 yr DM eye exam, pt states va is good, just had liver sx to remove cancer          ________________  9/17/2019  HPI     Diabetic Eye Exam      Additional comments: here for 1 yr DM eye exam, pt states va is good,   just had liver sx to remove cancer              Comments     + DIET CONTROLLED DM  MINI ERM OU  DRY EYES  ON RESTASIS  OU LL PLUGS          Last edited by DUANE Samson on 9/17/2019  7:42 AM. (History)      Problem List Items Addressed This Visit        Eye/Vision problems    Type 2 diabetes mellitus with post op hyperglycemia - Primary    Epiretinal membrane - Both Eyes        20 20 20   Asymptomatic  erm    no dr   dm 3 y  pcil ou   od posy yag   rtc 1 yolanda\    .       ===========================

## 2019-09-23 ENCOUNTER — TELEPHONE (OUTPATIENT)
Dept: AUDIOLOGY | Facility: CLINIC | Age: 74
End: 2019-09-23

## 2019-09-23 NOTE — TELEPHONE ENCOUNTER
SAVANNAM with main schedule line.  I will be out of office remainder of week; patient may see ENT /rt/  ----- Message from Rachele Mcgill sent at 9/23/2019 10:10 AM CDT -----  Contact: Patient's wife- Agata  Patient need to schedule an appointment, he states his crystals are off again and need a procedure done to relieve his discomfort. Please call at Ph.[phone] (home) Patient would like to be seen on Wednesday if possible.

## 2019-09-25 ENCOUNTER — OFFICE VISIT (OUTPATIENT)
Dept: OTOLARYNGOLOGY | Facility: CLINIC | Age: 74
End: 2019-09-25
Payer: MEDICARE

## 2019-09-25 ENCOUNTER — CLINICAL SUPPORT (OUTPATIENT)
Dept: AUDIOLOGY | Facility: CLINIC | Age: 74
End: 2019-09-25
Payer: MEDICARE

## 2019-09-25 VITALS
DIASTOLIC BLOOD PRESSURE: 61 MMHG | SYSTOLIC BLOOD PRESSURE: 138 MMHG | BODY MASS INDEX: 36.94 KG/M2 | TEMPERATURE: 99 F | HEART RATE: 62 BPM | WEIGHT: 242.94 LBS

## 2019-09-25 DIAGNOSIS — H81.11 BPPV (BENIGN PAROXYSMAL POSITIONAL VERTIGO), RIGHT: Primary | ICD-10-CM

## 2019-09-25 DIAGNOSIS — H81.11 BENIGN PAROXYSMAL POSITIONAL VERTIGO OF RIGHT EAR: Primary | ICD-10-CM

## 2019-09-25 PROCEDURE — 3075F PR MOST RECENT SYSTOLIC BLOOD PRESS GE 130-139MM HG: ICD-10-PCS | Mod: CPTII,S$GLB,, | Performed by: PHYSICIAN ASSISTANT

## 2019-09-25 PROCEDURE — 95992 PR CANALITH REPOSITIONING PROCEDURE, PER DAY: ICD-10-PCS | Mod: S$GLB,,, | Performed by: PHYSICIAN ASSISTANT

## 2019-09-25 PROCEDURE — 95992 CANALITH REPOSITIONING PROC: CPT | Mod: S$GLB,,, | Performed by: PHYSICIAN ASSISTANT

## 2019-09-25 PROCEDURE — 3078F PR MOST RECENT DIASTOLIC BLOOD PRESSURE < 80 MM HG: ICD-10-PCS | Mod: CPTII,S$GLB,, | Performed by: PHYSICIAN ASSISTANT

## 2019-09-25 PROCEDURE — 1101F PT FALLS ASSESS-DOCD LE1/YR: CPT | Mod: CPTII,S$GLB,, | Performed by: PHYSICIAN ASSISTANT

## 2019-09-25 PROCEDURE — 3078F DIAST BP <80 MM HG: CPT | Mod: CPTII,S$GLB,, | Performed by: PHYSICIAN ASSISTANT

## 2019-09-25 PROCEDURE — 99999 PR PBB SHADOW E&M-EST. PATIENT-LVL IV: ICD-10-PCS | Mod: PBBFAC,,, | Performed by: PHYSICIAN ASSISTANT

## 2019-09-25 PROCEDURE — 99213 PR OFFICE/OUTPT VISIT, EST, LEVL III, 20-29 MIN: ICD-10-PCS | Mod: 25,S$GLB,, | Performed by: PHYSICIAN ASSISTANT

## 2019-09-25 PROCEDURE — 3075F SYST BP GE 130 - 139MM HG: CPT | Mod: CPTII,S$GLB,, | Performed by: PHYSICIAN ASSISTANT

## 2019-09-25 PROCEDURE — 99213 OFFICE O/P EST LOW 20 MIN: CPT | Mod: 25,S$GLB,, | Performed by: PHYSICIAN ASSISTANT

## 2019-09-25 PROCEDURE — 1101F PR PT FALLS ASSESS DOC 0-1 FALLS W/OUT INJ PAST YR: ICD-10-PCS | Mod: CPTII,S$GLB,, | Performed by: PHYSICIAN ASSISTANT

## 2019-09-25 PROCEDURE — 99999 PR PBB SHADOW E&M-EST. PATIENT-LVL IV: CPT | Mod: PBBFAC,,, | Performed by: PHYSICIAN ASSISTANT

## 2019-09-25 NOTE — PROGRESS NOTES
Referring provider:     Erendira Pretty was seen 09/25/2019 for BPPV testing.    Patient reports spinning dizziness that began last Friday.  He had been clear of BPPV for about 6 weeks.    Billy-Hallpike Head-hanging Right: Positive    Billy-Hallpike Head-hanging Left: Negative    A 5-position Epley maneuver was performed for the right ear.  Patient tolerated the maneuver well and was asymptomatic upon discharge.  Post maneuver instructions were given to the patient both verbally and written.  Understanding was voiced.    Recommendations: Recheck in one week to ensure BPPV has resolved.    Diagnosis: BPPV - right ear.

## 2019-09-25 NOTE — PROGRESS NOTES
Subjective:       Patient ID: Erendira Pretty is a 74 y.o. male.    Chief Complaint: Dizziness (hx bppv)    Mr. Pretty is a 75 yo male here to see me today with dizziness upon arising out of bed x 2 weeks. H/o BPPV requiring repositioning.    Review of Systems   Constitutional: Negative for activity change, appetite change and fever.   HENT: Negative for congestion, ear discharge, ear pain, hearing loss, nosebleeds, postnasal drip, rhinorrhea, sinus pressure, sneezing, sore throat and tinnitus.    Eyes: Negative for discharge, redness and itching.   Respiratory: Negative for cough, shortness of breath and wheezing.    Cardiovascular: Negative for chest pain and palpitations.   Gastrointestinal: Negative for abdominal pain, diarrhea, nausea and vomiting.   Musculoskeletal: Negative for arthralgias.   Allergic/Immunologic: Negative for environmental allergies.   Neurological: Positive for dizziness. Negative for headaches.   Psychiatric/Behavioral: Negative for sleep disturbance.       Objective:      Physical Exam   Constitutional: He is oriented to person, place, and time. Vital signs are normal. He appears well-developed and well-nourished. No distress.   HENT:   Head: Normocephalic and atraumatic.   Right Ear: Hearing, tympanic membrane, external ear and ear canal normal.   Left Ear: Hearing, tympanic membrane, external ear and ear canal normal.   Nose: Nose normal. No mucosal edema, rhinorrhea, nasal deformity or septal deviation.   Mouth/Throat: Uvula is midline, oropharynx is clear and moist and mucous membranes are normal. No trismus in the jaw. Normal dentition. No uvula swelling. No oropharyngeal exudate or posterior oropharyngeal edema.   [place] (+) right   Eyes: Pupils are equal, round, and reactive to light. Conjunctivae and EOM are normal. Right eye exhibits no chemosis. Left eye exhibits no chemosis. Right conjunctiva is not injected. Left conjunctiva is not injected. No scleral icterus.   Neck:  Trachea normal and phonation normal. No tracheal tenderness present. No tracheal deviation present. No thyroid mass and no thyromegaly present.   Cardiovascular: Intact distal pulses.   Pulmonary/Chest: Effort normal. No accessory muscle usage or stridor. No respiratory distress.   Lymphadenopathy:        Head (right side): No submental, no submandibular, no preauricular and no posterior auricular adenopathy present.        Head (left side): No submental, no submandibular, no preauricular and no posterior auricular adenopathy present.     He has no cervical adenopathy.        Right cervical: No superficial cervical and no deep cervical adenopathy present.       Left cervical: No superficial cervical and no deep cervical adenopathy present.   Neurological: He is alert and oriented to person, place, and time. No cranial nerve deficit.   Skin: Skin is warm and dry. No rash noted. No erythema.   Psychiatric: He has a normal mood and affect. His behavior is normal. Thought content normal.       Assessment:       1. BPPV (benign paroxysmal positional vertigo), right        Plan:         Erendira Pretty was seen 09/25/2019 for Epley maneuver for BPPV in the right ear.      A 5-position Epley maneuver was performed for the right.  Patient tolerated the maneuver well and was asymptomatic upon discharge.  No nystagmus seen on post-procedure examination.  Post-Epley home instructions were reviewed and given to the patient.  Understanding was voiced.

## 2019-09-27 ENCOUNTER — HOSPITAL ENCOUNTER (OUTPATIENT)
Dept: RADIOLOGY | Facility: HOSPITAL | Age: 74
Discharge: HOME | End: 2019-09-27
Attending: INTERNAL MEDICINE
Payer: MEDICARE

## 2019-09-27 ENCOUNTER — CLINICAL SUPPORT (OUTPATIENT)
Dept: PULMONOLOGY | Facility: CLINIC | Age: 74
End: 2019-09-27
Payer: MEDICARE

## 2019-09-27 ENCOUNTER — OFFICE VISIT (OUTPATIENT)
Dept: PULMONOLOGY | Facility: CLINIC | Age: 74
End: 2019-09-27
Payer: MEDICARE

## 2019-09-27 VITALS
WEIGHT: 243.19 LBS | BODY MASS INDEX: 36.86 KG/M2 | RESPIRATION RATE: 20 BRPM | HEIGHT: 68 IN | DIASTOLIC BLOOD PRESSURE: 84 MMHG | SYSTOLIC BLOOD PRESSURE: 140 MMHG | OXYGEN SATURATION: 96 % | HEART RATE: 94 BPM

## 2019-09-27 DIAGNOSIS — I10 ESSENTIAL HYPERTENSION: ICD-10-CM

## 2019-09-27 DIAGNOSIS — J42 CHRONIC WHEEZY BRONCHITIS: Primary | ICD-10-CM

## 2019-09-27 DIAGNOSIS — J45.909 UNCOMPLICATED ASTHMA, UNSPECIFIED ASTHMA SEVERITY, UNSPECIFIED WHETHER PERSISTENT: ICD-10-CM

## 2019-09-27 DIAGNOSIS — E78.2 MIXED HYPERLIPIDEMIA: ICD-10-CM

## 2019-09-27 DIAGNOSIS — G47.33 OSA ON CPAP: Chronic | ICD-10-CM

## 2019-09-27 LAB
BRPFT: ABNORMAL
FEF 25 75 CHG: 63.3 %
FEF 25 75 LLN: 0.91
FEF 25 75 POST REF: 65.9 %
FEF 25 75 PRE REF: 40.3 %
FEF 25 75 REF: 2.19
FET100 CHG: -12.9 %
FEV1 CHG: 13.2 %
FEV1 FVC CHG: 9.7 %
FEV1 FVC LLN: 62
FEV1 FVC POST REF: 95.7 %
FEV1 FVC PRE REF: 87.3 %
FEV1 FVC REF: 76
FEV1 LLN: 2.08
FEV1 POST REF: 75 %
FEV1 PRE REF: 66.3 %
FEV1 REF: 2.92
FVC CHG: 3.2 %
FVC LLN: 2.84
FVC POST REF: 78 %
FVC PRE REF: 75.6 %
FVC REF: 3.86
PEF CHG: 5.8 %
PEF LLN: 5.44
PEF POST REF: 126.6 %
PEF PRE REF: 119.7 %
PEF REF: 7.63
POST FEF 25 75: 1.44 L/S (ref 0.91–3.47)
POST FET 100: 11.55 SEC
POST FEV1 FVC: 72.62 % (ref 61.94–89.75)
POST FEV1: 2.19 L (ref 2.08–3.75)
POST FVC: 3.01 L (ref 2.84–4.89)
POST PEF: 9.66 L/S (ref 5.44–9.82)
PRE FEF 25 75: 0.88 L/S (ref 0.91–3.47)
PRE FET 100: 13.26 SEC
PRE FEV1 FVC: 66.2 % (ref 61.94–89.75)
PRE FEV1: 1.93 L (ref 2.08–3.75)
PRE FVC: 2.92 L (ref 2.84–4.89)
PRE PEF: 9.13 L/S (ref 5.44–9.82)

## 2019-09-27 PROCEDURE — 3077F SYST BP >= 140 MM HG: CPT | Mod: CPTII,S$GLB,, | Performed by: INTERNAL MEDICINE

## 2019-09-27 PROCEDURE — 99999 PR PBB SHADOW E&M-EST. PATIENT-LVL V: CPT | Mod: PBBFAC,,, | Performed by: INTERNAL MEDICINE

## 2019-09-27 PROCEDURE — 94060 PR EVAL OF BRONCHOSPASM: ICD-10-PCS | Mod: S$GLB,,, | Performed by: INTERNAL MEDICINE

## 2019-09-27 PROCEDURE — 94060 EVALUATION OF WHEEZING: CPT | Mod: S$GLB,,, | Performed by: INTERNAL MEDICINE

## 2019-09-27 PROCEDURE — 71046 X-RAY EXAM CHEST 2 VIEWS: CPT | Mod: 26,,, | Performed by: RADIOLOGY

## 2019-09-27 PROCEDURE — 1101F PT FALLS ASSESS-DOCD LE1/YR: CPT | Mod: CPTII,S$GLB,, | Performed by: INTERNAL MEDICINE

## 2019-09-27 PROCEDURE — 99999 PR PBB SHADOW E&M-EST. PATIENT-LVL V: ICD-10-PCS | Mod: PBBFAC,,, | Performed by: INTERNAL MEDICINE

## 2019-09-27 PROCEDURE — 99214 PR OFFICE/OUTPT VISIT, EST, LEVL IV, 30-39 MIN: ICD-10-PCS | Mod: 25,S$GLB,, | Performed by: INTERNAL MEDICINE

## 2019-09-27 PROCEDURE — 71046 X-RAY EXAM CHEST 2 VIEWS: CPT | Mod: TC

## 2019-09-27 PROCEDURE — 99214 OFFICE O/P EST MOD 30 MIN: CPT | Mod: 25,S$GLB,, | Performed by: INTERNAL MEDICINE

## 2019-09-27 PROCEDURE — 71046 XR CHEST PA AND LATERAL: ICD-10-PCS | Mod: 26,,, | Performed by: RADIOLOGY

## 2019-09-27 PROCEDURE — 1101F PR PT FALLS ASSESS DOC 0-1 FALLS W/OUT INJ PAST YR: ICD-10-PCS | Mod: CPTII,S$GLB,, | Performed by: INTERNAL MEDICINE

## 2019-09-27 PROCEDURE — 3079F DIAST BP 80-89 MM HG: CPT | Mod: CPTII,S$GLB,, | Performed by: INTERNAL MEDICINE

## 2019-09-27 PROCEDURE — 3077F PR MOST RECENT SYSTOLIC BLOOD PRESSURE >= 140 MM HG: ICD-10-PCS | Mod: CPTII,S$GLB,, | Performed by: INTERNAL MEDICINE

## 2019-09-27 PROCEDURE — 3079F PR MOST RECENT DIASTOLIC BLOOD PRESSURE 80-89 MM HG: ICD-10-PCS | Mod: CPTII,S$GLB,, | Performed by: INTERNAL MEDICINE

## 2019-09-27 RX ORDER — PREDNISONE 20 MG/1
20 TABLET ORAL SEE ADMIN INSTRUCTIONS
Qty: 25 TABLET | Refills: 0 | Status: ON HOLD | OUTPATIENT
Start: 2019-09-27 | End: 2020-03-03 | Stop reason: HOSPADM

## 2019-09-27 NOTE — ASSESSMENT & PLAN NOTE
Currently on CPAP 11 cm water pressure  Subjectively patient verbalizes use and benefit from machine.  Bring CPAP machine for validation of adherence  May qualify for new machine July 2020

## 2019-09-27 NOTE — ASSESSMENT & PLAN NOTE
ACT score 24  immunizations are current  Recent shortness of breath wheezing after radiofrequency ablation hepatic lesion  FEV1 FVC declined  Med: SYMBICORT and RESCUE albuterol  FEV1:1.93L ( 66.3%), FVC 2.92L( 75.0%)  FEV1/FVC 66.2    Steroid taper sent to pharmacy

## 2019-09-30 ENCOUNTER — OFFICE VISIT (OUTPATIENT)
Dept: OTOLARYNGOLOGY | Facility: CLINIC | Age: 74
End: 2019-09-30
Payer: MEDICARE

## 2019-09-30 ENCOUNTER — TELEPHONE (OUTPATIENT)
Dept: PULMONOLOGY | Facility: CLINIC | Age: 74
End: 2019-09-30

## 2019-09-30 ENCOUNTER — DOCUMENTATION ONLY (OUTPATIENT)
Dept: PULMONOLOGY | Facility: CLINIC | Age: 74
End: 2019-09-30

## 2019-09-30 VITALS
DIASTOLIC BLOOD PRESSURE: 95 MMHG | BODY MASS INDEX: 36.65 KG/M2 | HEART RATE: 83 BPM | SYSTOLIC BLOOD PRESSURE: 175 MMHG | WEIGHT: 241.88 LBS

## 2019-09-30 DIAGNOSIS — H61.23 BILATERAL IMPACTED CERUMEN: Primary | ICD-10-CM

## 2019-09-30 DIAGNOSIS — H81.11 BPPV (BENIGN PAROXYSMAL POSITIONAL VERTIGO), RIGHT: ICD-10-CM

## 2019-09-30 PROCEDURE — 99499 UNLISTED E&M SERVICE: CPT | Mod: S$GLB,,, | Performed by: PHYSICIAN ASSISTANT

## 2019-09-30 PROCEDURE — 69210 REMOVE IMPACTED EAR WAX UNI: CPT | Mod: S$GLB,,, | Performed by: PHYSICIAN ASSISTANT

## 2019-09-30 PROCEDURE — 99499 NO LOS: ICD-10-PCS | Mod: S$GLB,,, | Performed by: PHYSICIAN ASSISTANT

## 2019-09-30 PROCEDURE — 69210 PR REMOVAL IMPACTED CERUMEN REQUIRING INSTRUMENTATION, UNILATERAL: ICD-10-PCS | Mod: S$GLB,,, | Performed by: PHYSICIAN ASSISTANT

## 2019-09-30 PROCEDURE — 99999 PR PBB SHADOW E&M-EST. PATIENT-LVL IV: ICD-10-PCS | Mod: PBBFAC,,, | Performed by: PHYSICIAN ASSISTANT

## 2019-09-30 PROCEDURE — 99999 PR PBB SHADOW E&M-EST. PATIENT-LVL IV: CPT | Mod: PBBFAC,,, | Performed by: PHYSICIAN ASSISTANT

## 2019-09-30 NOTE — TELEPHONE ENCOUNTER
Returned patient's call regarding blood sugar with prednisone. Patient stated after taking his first dose of prednisone his BS increased to 310 and he does not want to take any more. Please advise.

## 2019-09-30 NOTE — PROGRESS NOTES
Download was reviewed  Patient was CPAP 11 cm water pressure  Machine is working appropriately  Patient is using device and benefits from it      8/31/2019 - 9/29/2019  YOB: 1945  Mask:  Compliance Summary  8/31/2019 - 9/29/2019 (30 days)  Days with Device Usage 26 days  Days without Device Usage 4 days  Percent Days with Device Usage 86.7%  Cumulative Usage 9 days 4 hrs. 2 mins. 57 secs.  Maximum Usage (1 Day) 10 hrs. 24 mins. 15 secs.  Average Usage (All Days) 7 hrs. 20 mins. 5 secs.  Average Usage (Days Used) 8 hrs. 27 mins. 48 secs.  Minimum Usage (1 Day) 2 hrs. 31 mins. 56 secs.  Percent of Days with Usage >= 4 Hours 80.0%  Percent of Days with Usage < 4 Hours 20.0%  Date Range  Total Blower Time 9 days 5 hrs. 50 mins. 48 secs.  CPAP Summary ([facility])  Average Time in Large Leak Per Day 2 mins. 7 secs.  Average AHI 2.4  CPAP 11.0 cmH2O

## 2019-09-30 NOTE — TELEPHONE ENCOUNTER
Called patient back to advise that he can stop taking the prednisone. No answer, lvm stating intentions.

## 2019-09-30 NOTE — TELEPHONE ENCOUNTER
----- Message from Nguyen Kraft sent at 9/30/2019  8:04 AM CDT -----  Contact: Patient's wife-Agata  Type: Needs Medical Advice    Who Called:  Patient's wife-Agata  Symptoms (please be specific):  Congestion/elevated blood sugar  How long has patient had these symptoms:  na  Pharmacy name and phone #:    [facility] - [place], [place] - [address] [address]  [facility] [address]  Phone: [phone] Fax: [phone]     [facility] Call Back Number: [phone]    Additional Information: Patient was prescribed Prednisone and has noticed an increase in his blood glucose levels. Refused on call nurse, will wait for call from office. Requesting a call to discuss, thank you!

## 2019-09-30 NOTE — PROGRESS NOTES
Subjective:       Patient ID: Erendira Pretty is a 74 y.o. male.    Chief Complaint: Follow-up    Patient is here to see me today for evaluation of a possible wax impaction in bilateral ears.  He has complaints of hearing loss in the affected ear, but denies pain or drainage.  This has been an issue in the past.  The patient has not been using any sort of ear drop to soften the wax. I also saw him last week for canalith repositioning for BPPV. Dizziness has resolved.     Review of Systems   Constitutional: Negative for fatigue, fever and unexpected weight change.   HENT: Negative for congestion, ear discharge, ear pain, facial swelling, hearing loss, nosebleeds, postnasal drip, rhinorrhea, sinus pressure, sneezing, sore throat, tinnitus, trouble swallowing and voice change.    Eyes: Negative for discharge, redness and itching.   Respiratory: Negative for cough, choking, shortness of breath and wheezing.    Cardiovascular: Negative for chest pain and palpitations.   Gastrointestinal: Negative for abdominal pain.        No reflux.   Musculoskeletal: Negative for neck pain.   Neurological: Negative for dizziness, facial asymmetry, light-headedness and headaches.   Hematological: Negative for adenopathy. Does not bruise/bleed easily.   Psychiatric/Behavioral: Negative for agitation, behavioral problems, confusion and decreased concentration.       Objective:      Physical Exam   Constitutional: He is oriented to person, place, and time. Vital signs are normal. He appears well-developed and well-nourished. No distress.   HENT:   Head: Normocephalic and atraumatic.   Right Ear: Hearing, tympanic membrane, external ear and ear canal normal.   Left Ear: Hearing, tympanic membrane, external ear and ear canal normal.   Nose: Nose normal. No mucosal edema, rhinorrhea, nasal deformity or septal deviation.   Mouth/Throat: Uvula is midline, oropharynx is clear and moist and mucous membranes are normal. No trismus in the jaw.  Normal dentition. No uvula swelling. No oropharyngeal exudate or posterior oropharyngeal edema.   Cerumen impaction bilaterally   Eyes: Pupils are equal, round, and reactive to light. Conjunctivae and EOM are normal. Right eye exhibits no chemosis. Left eye exhibits no chemosis. Right conjunctiva is not injected. Left conjunctiva is not injected. No scleral icterus.   Neck: Trachea normal and phonation normal. No tracheal tenderness present. No tracheal deviation present. No thyroid mass and no thyromegaly present.   Cardiovascular: Intact distal pulses.   Pulmonary/Chest: Effort normal. No accessory muscle usage or stridor. No respiratory distress.   Lymphadenopathy:        Head (right side): No submental, no submandibular, no preauricular and no posterior auricular adenopathy present.        Head (left side): No submental, no submandibular, no preauricular and no posterior auricular adenopathy present.     He has no cervical adenopathy.        Right cervical: No superficial cervical and no deep cervical adenopathy present.       Left cervical: No superficial cervical and no deep cervical adenopathy present.   Neurological: He is alert and oriented to person, place, and time. No cranial nerve deficit.   Skin: Skin is warm and dry. No rash noted. No erythema.   Psychiatric: He has a normal mood and affect. His behavior is normal. Thought content normal.       Assessment:       1. Bilateral impacted cerumen    2. BPPV (benign paroxysmal positional vertigo), right        Plan:        Cerumen impaction:  Removed today without difficulty.  I would recommend the use of a wax softening drop, either over the counter Debrox or mineral oil, on a weekly basis.  I also instructed the patient to avoid Qtips.    BPPV: resolved.

## 2019-10-07 ENCOUNTER — CLINICAL SUPPORT (OUTPATIENT)
Dept: AUDIOLOGY | Facility: CLINIC | Age: 74
End: 2019-10-07
Payer: MEDICARE

## 2019-10-07 DIAGNOSIS — H81.92 PERIPHERAL VESTIBULOPATHY OF LEFT EAR: Primary | ICD-10-CM

## 2019-10-07 PROCEDURE — 92538 CALORIC VSTBLR TEST W/REC: CPT | Mod: S$GLB,,, | Performed by: AUDIOLOGIST-HEARING AID FITTER

## 2019-10-07 PROCEDURE — 92540 PR VESTIBULAR EVAL NYSTAG FOVL&PERPH STIM OSCIL TRACKING: ICD-10-PCS | Mod: S$GLB,,, | Performed by: AUDIOLOGIST-HEARING AID FITTER

## 2019-10-07 PROCEDURE — 92538 PR CALORIC VSTBLR TEST W/REC MONOTHERMAL: ICD-10-PCS | Mod: S$GLB,,, | Performed by: AUDIOLOGIST-HEARING AID FITTER

## 2019-10-07 PROCEDURE — 92540 BASIC VESTIBULAR EVALUATION: CPT | Mod: S$GLB,,, | Performed by: AUDIOLOGIST-HEARING AID FITTER

## 2019-10-07 NOTE — PROGRESS NOTES
"Erendira Pretty was seen 10/07/2019 for a vestibular evaluation.  Patient reports problems with dizziness that started after a sinus infection.  He was seen in clinic for recurrent right BPPV on 19, 2019 and 2019.  He reports two days ago he felt "his eyes were unstable" while moving around and then the next day he awoke with dizziness.  He describes a wobbly sensation rather than spinning (like previous episodes).  The dizziness is provoked upon sitting up from bed and this time persists for hours.  No subjective change in hearing.      Videonystagmography Report (VNG):  Oculomotor function tests (sinusoidal tracking, saccade, optokinetic) were normal and symmetric.  Gaze test was absent for nystagmus.  Spontaneous test was absent for nystagmus.  Static Positional test revealed down-beating nystagmus that suppressed with fixation:   Head Center: 3 d/s DB   Head Right: Absent   Head Left: 11 d/s DB  [place]-Hallpike Right revealed 6 d/s down-beating nystagmus; suppressed with fixation.    [place]-Hallpike Left revealed 5 d/s down-beating nystagmus; suppressed with fixation.    Bi-thermal caloric test was Abnormal - 26% LE UW.  Fixation suppression following caloric irrigations was normal.  The following values were obtained:  Unilateral weakness (UW): 26% left ear  Directional preponderance (DP): 3% left beating  RC: 24 d/s   d/s  RW: 24 d/s  LW: 16 d/s     [place]-Hallpike was retested without the video goggles:  Head-hanging Right: No nystagmus was elicited.   Head-hanging Left: Down-beating nystagmus was elicited with dizziness.    Summary: Abnormal VNG, consistent of left peripheral vestibular dysfunction and possibly left BPPV.   Today's abnormal findings were:  1. 26% left caloric weakness  2. Down-beating positional nystagmus  3. Hallpike Left revealed down-beating nystagmus (non-torsional) but did fatigue. This may suggest anterior canal BPPV.     A Left Epley maneuver was completed today. Patient " tolerated the maneuver well.  He did not appreciate immediate improvement in dizziness like previous times when treated for right BPPV.     Recommendations:  1. Head restrictions following Epley.  Understanding was voiced.  2. Return in one week for audiogram and positionals testing.  Patient may benefit from formal VRT.   3. ENT following recheck audiology visit.     Tracings are to be scanned.

## 2019-10-09 ENCOUNTER — HOSPITAL ENCOUNTER (OUTPATIENT)
Dept: RADIOLOGY | Facility: HOSPITAL | Age: 74
Discharge: HOME | End: 2019-10-09
Attending: OTOLARYNGOLOGY
Payer: MEDICARE

## 2019-10-09 DIAGNOSIS — K76.9 LIVER LESION: ICD-10-CM

## 2019-10-09 PROCEDURE — 25500020 PHARM REV CODE 255: Performed by: OTOLARYNGOLOGY

## 2019-10-09 PROCEDURE — 74183 MRI ABD W/O CNTR FLWD CNTR: CPT | Mod: TC

## 2019-10-09 PROCEDURE — A9585 GADOBUTROL INJECTION: HCPCS | Performed by: OTOLARYNGOLOGY

## 2019-10-09 RX ORDER — GADOBUTROL 604.72 MG/ML
10 INJECTION INTRAVENOUS
Status: COMPLETED | OUTPATIENT
Start: 2019-10-09 | End: 2019-10-09

## 2019-10-09 RX ADMIN — GADOBUTROL 10 ML: 604.72 INJECTION INTRAVENOUS at 11:10

## 2019-10-15 ENCOUNTER — CLINICAL SUPPORT (OUTPATIENT)
Dept: AUDIOLOGY | Facility: CLINIC | Age: 74
End: 2019-10-15
Payer: MEDICARE

## 2019-10-15 DIAGNOSIS — H81.12 BPPV (BENIGN PAROXYSMAL POSITIONAL VERTIGO), LEFT: Primary | ICD-10-CM

## 2019-10-15 DIAGNOSIS — H90.3 SENSORINEURAL HEARING LOSS, BILATERAL: Primary | ICD-10-CM

## 2019-10-15 DIAGNOSIS — H81.93 VESTIBULOPATHY OF BOTH EARS: ICD-10-CM

## 2019-10-15 DIAGNOSIS — H93.13 TINNITUS, BILATERAL: ICD-10-CM

## 2019-10-15 PROCEDURE — 92542 PR POSITIONAL NYSTAGMUS TEST: ICD-10-PCS | Mod: S$GLB,,, | Performed by: AUDIOLOGIST-HEARING AID FITTER

## 2019-10-15 PROCEDURE — 92557 COMPREHENSIVE HEARING TEST: CPT | Mod: S$GLB,,, | Performed by: AUDIOLOGIST-HEARING AID FITTER

## 2019-10-15 PROCEDURE — 92567 PR TYMPA2METRY: ICD-10-PCS | Mod: S$GLB,,, | Performed by: AUDIOLOGIST-HEARING AID FITTER

## 2019-10-15 PROCEDURE — 92557 PR COMPREHENSIVE HEARING TEST: ICD-10-PCS | Mod: S$GLB,,, | Performed by: AUDIOLOGIST-HEARING AID FITTER

## 2019-10-15 PROCEDURE — 92567 TYMPANOMETRY: CPT | Mod: S$GLB,,, | Performed by: AUDIOLOGIST-HEARING AID FITTER

## 2019-10-15 PROCEDURE — 92542 POSITIONAL NYSTAGMUS TEST: CPT | Mod: S$GLB,,, | Performed by: AUDIOLOGIST-HEARING AID FITTER

## 2019-10-15 NOTE — PROGRESS NOTES
Referring provider: Dr. Iggy Pretty was seen 10/15/2019 for an audiological evaluation.  Patient  reports long-standing history of bilateral high-frequency hearing loss.  He has tinnitus in the bilateral ear that has been present for years and is unchanged.  He has history of loud noise exposure.  Recent audiogram revealed a left UW (26%).  He has also been seen over the past few months for recurrent BPPV. He reports dizziness is resolved following last week's Epley maneuver.     Results reveal bilateral symmetric normal sloping to severe sensorineural hearing loss 250-8000 Hz.  Speech Reception Thresholds were 30 dBHL for the right ear and 30 dBHL for the left ear.   Word recognition scores were good for the right ear and good for the left ear.   Tympanograms were Type A for the right ear and Type A for the left ear.    Patient was counseled on the above findings.    Recommendations:  1. Annual audiogram  2. Hearing aids, binaural. Patient not interested.  He does not appreciate a hearing or communicaiton problem in quiet or in background noise.   3. Hearing protection around loud noises

## 2019-10-15 NOTE — PROGRESS NOTES
Erendira Pretty was seen 10/15/2019 for re-check BPPV.   Patient had VNG last week (10/7/19) that revealed a left caloric weakness (26%) and suspected also left BPPV.  He obtained an Epley maneuver last week for left BPPV.  Patient reports dizziness has resolved.       Vestibular Screen:  Spontaneous Nystagmus Test: 3 d/s down-beating nystagmus; (+) fixation suppression  Static Positionals:    Head Center: Absent for nystagmus   Head Right: Absent for nystagmus   Head Left: 3 d/s down-beating nystagmus  [place]-Hallpike Head-hanging Right: Negative for BPPV - absent for nystagmus or dizziness.  [place]-Hallpike Head-hanging Left: Negative for BPPV - absent for nystagmus or dizziness.    Summary: BPPV has resolved. ENT appintment following audiology testing was cancelled due to normal f/u test results and symptoms are resolved.      Recommendations:  1. Patient may resume regular activity.   2. Return to clinic as needed.

## 2019-10-21 ENCOUNTER — TELEPHONE (OUTPATIENT)
Dept: RADIOLOGY | Facility: HOSPITAL | Age: 74
End: 2019-10-21

## 2019-10-21 DIAGNOSIS — E78.2 MIXED HYPERLIPIDEMIA: ICD-10-CM

## 2019-10-21 DIAGNOSIS — K76.9 LIVER LESION: ICD-10-CM

## 2019-10-21 DIAGNOSIS — I25.10 CORONARY ARTERY DISEASE INVOLVING NATIVE CORONARY ARTERY OF NATIVE HEART WITHOUT ANGINA PECTORIS: Primary | ICD-10-CM

## 2019-10-25 ENCOUNTER — CLINICAL SUPPORT (OUTPATIENT)
Dept: AUDIOLOGY | Facility: CLINIC | Age: 74
End: 2019-10-25
Payer: MEDICARE

## 2019-10-25 DIAGNOSIS — H81.92 PERIPHERAL VESTIBULOPATHY OF LEFT EAR: Primary | ICD-10-CM

## 2019-10-25 PROCEDURE — 92542 PR POSITIONAL NYSTAGMUS TEST: ICD-10-PCS | Mod: S$GLB,,, | Performed by: AUDIOLOGIST-HEARING AID FITTER

## 2019-10-25 PROCEDURE — 92542 POSITIONAL NYSTAGMUS TEST: CPT | Mod: S$GLB,,, | Performed by: AUDIOLOGIST-HEARING AID FITTER

## 2019-10-28 ENCOUNTER — TELEPHONE (OUTPATIENT)
Dept: AUDIOLOGY | Facility: CLINIC | Age: 74
End: 2019-10-28

## 2019-10-28 DIAGNOSIS — R42 DIZZY: Primary | ICD-10-CM

## 2019-10-28 NOTE — PROGRESS NOTES
"Referring provider: HALEIGH Harrison KENRICK Pretty was seen 10/28/2019 for a vestibular screen for suspected BPPV. He has been seen over the past few months for recurrent BPPV. He had a diagnostic VNG on 10/7/2019 that indicated the presence of a left peripheral vestibulopathy, as evidenced by a 26% left unilateral weakness. The patient has not had formal VRT to date. Today he reports dizziness has returned and is sure it's his "crystals". He reports it occurs while sitting, standing, laying down, and getting up.     VNG Screen:  Spontaneous test: 2 d/s DB nystagmus  Gaze test: Absent for nystagmus  Static Positional test:    Supine: 2 d/s DB nystagmus   Head Right:4 d/s LB + 2 d/s DB nystagmus that suppressed with fixation.   Head Left: 3 d/s RB + 2 d/s DB that suppressed with fixation.  [place]illy-Hallpike Right: Negative for BPPV. 4 d/s DB nystagmus that suppressed with fixation. Patient denied c/o dizziness during DHP.  [place]-Hallpike Left: Negative for BPPV. 6 d/s DB nystagmus that suppressed with fixation. Patient denied c/o dizziness during DHP.    Summary:   No evidence of BPPV today. Patient needs formal VRT due to left peripheral vestibular dysfunction (26% unilateral weakness). Also found downbeating nystagmus today.    Recommendations:  1. Formal VRT at [facility].    "

## 2019-10-28 NOTE — TELEPHONE ENCOUNTER
Spoke with patient's wife and informed her that Madeline Davies will be placing referral for VRT and the PT department will contact them to schedule. She verbalized understanding.     ----- Message from Nando Dejesus sent at 10/28/2019  1:03 PM CDT -----  Contact: pt wife - heaven   States she' s calling to set up therapy for the pt and can be reached at [phone]/thanks/dbw

## 2019-11-18 ENCOUNTER — TELEPHONE (OUTPATIENT)
Dept: OTOLARYNGOLOGY | Facility: CLINIC | Age: 74
End: 2019-11-18

## 2019-11-18 ENCOUNTER — LAB VISIT (OUTPATIENT)
Dept: LAB | Facility: HOSPITAL | Age: 74
End: 2019-11-18
Attending: INTERNAL MEDICINE
Payer: MEDICARE

## 2019-11-18 ENCOUNTER — OFFICE VISIT (OUTPATIENT)
Dept: GASTROENTEROLOGY | Facility: CLINIC | Age: 74
End: 2019-11-18
Payer: MEDICARE

## 2019-11-18 ENCOUNTER — TELEPHONE (OUTPATIENT)
Dept: CARDIOLOGY | Facility: CLINIC | Age: 74
End: 2019-11-18

## 2019-11-18 VITALS
HEART RATE: 76 BPM | BODY MASS INDEX: 37.02 KG/M2 | SYSTOLIC BLOOD PRESSURE: 148 MMHG | HEIGHT: 68 IN | DIASTOLIC BLOOD PRESSURE: 84 MMHG | WEIGHT: 244.25 LBS

## 2019-11-18 DIAGNOSIS — K74.69 OTHER CIRRHOSIS OF LIVER: ICD-10-CM

## 2019-11-18 DIAGNOSIS — C22.0 HCC (HEPATOCELLULAR CARCINOMA): Primary | ICD-10-CM

## 2019-11-18 DIAGNOSIS — D50.9 IRON DEFICIENCY ANEMIA, UNSPECIFIED IRON DEFICIENCY ANEMIA TYPE: ICD-10-CM

## 2019-11-18 LAB
AFP SERPL-MCNC: 4.9 NG/ML (ref 0–8.4)
ALBUMIN SERPL BCP-MCNC: 3.9 G/DL (ref 3.5–5.2)
ALP SERPL-CCNC: 83 U/L (ref 55–135)
ALT SERPL W/O P-5'-P-CCNC: 26 U/L (ref 10–44)
ANION GAP SERPL CALC-SCNC: 10 MMOL/L (ref 8–16)
AST SERPL-CCNC: 25 U/L (ref 10–40)
BASOPHILS # BLD AUTO: 0.03 K/UL (ref 0–0.2)
BASOPHILS NFR BLD: 0.6 % (ref 0–1.9)
BILIRUB SERPL-MCNC: 0.6 MG/DL (ref 0.1–1)
BUN SERPL-MCNC: 20 MG/DL (ref 8–23)
CALCIUM SERPL-MCNC: 9.2 MG/DL (ref 8.7–10.5)
CHLORIDE SERPL-SCNC: 103 MMOL/L (ref 95–110)
CO2 SERPL-SCNC: 27 MMOL/L (ref 23–29)
CREAT SERPL-MCNC: 1.2 MG/DL (ref 0.5–1.4)
DIFFERENTIAL METHOD: ABNORMAL
EOSINOPHIL # BLD AUTO: 0.2 K/UL (ref 0–0.5)
EOSINOPHIL NFR BLD: 3.4 % (ref 0–8)
ERYTHROCYTE [DISTWIDTH] IN BLOOD BY AUTOMATED COUNT: 12.4 % (ref 11.5–14.5)
EST. GFR  (AFRICAN AMERICAN): >60 ML/MIN/1.73 M^2
EST. GFR  (NON AFRICAN AMERICAN): 60 ML/MIN/1.73 M^2
GLUCOSE SERPL-MCNC: 275 MG/DL (ref 70–110)
HCT VFR BLD AUTO: 41.1 % (ref 40–54)
HGB BLD-MCNC: 14.7 G/DL (ref 14–18)
IMM GRANULOCYTES # BLD AUTO: 0.02 K/UL (ref 0–0.04)
IMM GRANULOCYTES NFR BLD AUTO: 0.4 % (ref 0–0.5)
INR PPP: 1 (ref 0.8–1.2)
LYMPHOCYTES # BLD AUTO: 1.3 K/UL (ref 1–4.8)
LYMPHOCYTES NFR BLD: 25.4 % (ref 18–48)
MCH RBC QN AUTO: 33.9 PG (ref 27–31)
MCHC RBC AUTO-ENTMCNC: 35.8 G/DL (ref 32–36)
MCV RBC AUTO: 95 FL (ref 82–98)
MONOCYTES # BLD AUTO: 0.4 K/UL (ref 0.3–1)
MONOCYTES NFR BLD: 7.6 % (ref 4–15)
NEUTROPHILS # BLD AUTO: 3.2 K/UL (ref 1.8–7.7)
NEUTROPHILS NFR BLD: 63 % (ref 38–73)
NRBC BLD-RTO: 0 /100 WBC
PLATELET # BLD AUTO: 133 K/UL (ref 150–350)
PMV BLD AUTO: 9.7 FL (ref 9.2–12.9)
POTASSIUM SERPL-SCNC: 4 MMOL/L (ref 3.5–5.1)
PROT SERPL-MCNC: 8 G/DL (ref 6–8.4)
PROTHROMBIN TIME: 10.8 SEC (ref 9–12.5)
RBC # BLD AUTO: 4.33 M/UL (ref 4.6–6.2)
SODIUM SERPL-SCNC: 140 MMOL/L (ref 136–145)
WBC # BLD AUTO: 5 K/UL (ref 3.9–12.7)

## 2019-11-18 PROCEDURE — 80053 COMPREHEN METABOLIC PANEL: CPT

## 2019-11-18 PROCEDURE — 85025 COMPLETE CBC W/AUTO DIFF WBC: CPT

## 2019-11-18 PROCEDURE — 3077F PR MOST RECENT SYSTOLIC BLOOD PRESSURE >= 140 MM HG: ICD-10-PCS | Mod: CPTII,S$GLB,, | Performed by: INTERNAL MEDICINE

## 2019-11-18 PROCEDURE — 36415 COLL VENOUS BLD VENIPUNCTURE: CPT

## 2019-11-18 PROCEDURE — 3079F PR MOST RECENT DIASTOLIC BLOOD PRESSURE 80-89 MM HG: ICD-10-PCS | Mod: CPTII,S$GLB,, | Performed by: INTERNAL MEDICINE

## 2019-11-18 PROCEDURE — 99213 PR OFFICE/OUTPT VISIT, EST, LEVL III, 20-29 MIN: ICD-10-PCS | Mod: S$GLB,,, | Performed by: INTERNAL MEDICINE

## 2019-11-18 PROCEDURE — 82105 ALPHA-FETOPROTEIN SERUM: CPT

## 2019-11-18 PROCEDURE — 85610 PROTHROMBIN TIME: CPT

## 2019-11-18 PROCEDURE — 99999 PR PBB SHADOW E&M-EST. PATIENT-LVL III: ICD-10-PCS | Mod: PBBFAC,,, | Performed by: INTERNAL MEDICINE

## 2019-11-18 PROCEDURE — 1101F PR PT FALLS ASSESS DOC 0-1 FALLS W/OUT INJ PAST YR: ICD-10-PCS | Mod: CPTII,S$GLB,, | Performed by: INTERNAL MEDICINE

## 2019-11-18 PROCEDURE — 3077F SYST BP >= 140 MM HG: CPT | Mod: CPTII,S$GLB,, | Performed by: INTERNAL MEDICINE

## 2019-11-18 PROCEDURE — 1101F PT FALLS ASSESS-DOCD LE1/YR: CPT | Mod: CPTII,S$GLB,, | Performed by: INTERNAL MEDICINE

## 2019-11-18 PROCEDURE — 99213 OFFICE O/P EST LOW 20 MIN: CPT | Mod: S$GLB,,, | Performed by: INTERNAL MEDICINE

## 2019-11-18 PROCEDURE — 3079F DIAST BP 80-89 MM HG: CPT | Mod: CPTII,S$GLB,, | Performed by: INTERNAL MEDICINE

## 2019-11-18 PROCEDURE — 99999 PR PBB SHADOW E&M-EST. PATIENT-LVL III: CPT | Mod: PBBFAC,,, | Performed by: INTERNAL MEDICINE

## 2019-11-18 NOTE — TELEPHONE ENCOUNTER
Rtc and left v/m that I sent a message to Dr Gallardo asking what labs he wants. Once he tells me I will call to schedule. Cm  ----- Message from Rachele Mcgill sent at 11/18/2019  7:53 AM CST -----  Contact: Patient  Patient would like a call back concerning having labs done before his appointment to see Dr. Gallardo on 12/10/19. Please call at Ph .[phone].

## 2019-11-18 NOTE — PROGRESS NOTES
Subjective:     Erendira Pretty is here for follow up of cirrhosis    HPI  Since Erendira Pretty's last visit he had the ablation for his cancer.  He reports it resulted in a lot of pain but that has resolved.  Overall feels well.    No evidence of liver decompensation: no ascites, confusion or GI bleeding.    HCC s/p ablation 9/2019      Review of Systems    Objective:     Physical Exam   Constitutional: He is oriented to person, place, and time. He appears well-developed and well-nourished. No distress.   HENT:   Head: Normocephalic and atraumatic.   Mouth/Throat: Oropharynx is clear and moist. No oropharyngeal exudate.   Eyes: Pupils are equal, round, and reactive to light. Conjunctivae are normal. Right eye exhibits no discharge. Left eye exhibits no discharge. No scleral icterus.   Pulmonary/Chest: Effort normal and breath sounds normal. No respiratory distress. He has no wheezes.   Abdominal: Soft. He exhibits no distension. There is no tenderness.   Musculoskeletal: He exhibits no edema.   Neurological: He is alert and oriented to person, place, and time.   Psychiatric: He has a normal mood and affect. His behavior is normal.   Vitals reviewed.      MELD-Na score: 8 at 11/18/2019  9:49 AM  MELD score: 8 at 11/18/2019  9:49 AM  Calculated from:  Serum Creatinine: 1.2 mg/dL at 11/18/2019  9:49 AM  Serum Sodium: 140 mmol/L (Rounded to 137 mmol/L) at 11/18/2019  9:49 AM  Total Bilirubin: 0.6 mg/dL (Rounded to 1 mg/dL) at 11/18/2019  9:49 AM  INR(ratio): 1.0 at 11/18/2019  9:49 AM  Age: 74 years    WBC   Date Value Ref Range Status   11/18/2019 5.00 3.90 - 12.70 K/uL Final     Hemoglobin   Date Value Ref Range Status   11/18/2019 14.7 14.0 - 18.0 g/dL Final     POC Hematocrit   Date Value Ref Range Status   11/05/2018 26 (L) 36 - 54 %PCV Final     Hematocrit   Date Value Ref Range Status   11/18/2019 41.1 40.0 - 54.0 % Final     Platelets   Date Value Ref Range Status   11/18/2019 133 (L) 150 - 350 K/uL Final      BUN, Bld   Date Value Ref Range Status   11/18/2019 20 8 - 23 mg/dL Final     Creatinine   Date Value Ref Range Status   11/18/2019 1.2 0.5 - 1.4 mg/dL Final     Glucose   Date Value Ref Range Status   11/18/2019 275 (H) 70 - 110 mg/dL Final     Calcium   Date Value Ref Range Status   11/18/2019 9.2 8.7 - 10.5 mg/dL Final     Sodium   Date Value Ref Range Status   11/18/2019 140 136 - 145 mmol/L Final     Potassium   Date Value Ref Range Status   11/18/2019 4.0 3.5 - 5.1 mmol/L Final     Chloride   Date Value Ref Range Status   11/18/2019 103 95 - 110 mmol/L Final     Magnesium   Date Value Ref Range Status   11/08/2018 2.1 1.6 - 2.6 mg/dL Final     AST   Date Value Ref Range Status   11/18/2019 25 10 - 40 U/L Final     ALT   Date Value Ref Range Status   11/18/2019 26 10 - 44 U/L Final     Alkaline Phosphatase   Date Value Ref Range Status   11/18/2019 83 55 - 135 U/L Final     Total Bilirubin   Date Value Ref Range Status   11/18/2019 0.6 0.1 - 1.0 mg/dL Final     Comment:     For infants and newborns, interpretation of results should be based  on gestational age, weight and in agreement with clinical  observations.  Premature Infant recommended reference ranges:  Up to 24 hours.............<8.0 mg/dL  Up to 48 hours............<12.0 mg/dL  3-5 days..................<15.0 mg/dL  6-29 days.................<15.0 mg/dL       Albumin   Date Value Ref Range Status   11/18/2019 3.9 3.5 - 5.2 g/dL Final     INR   Date Value Ref Range Status   11/18/2019 1.0 0.8 - 1.2 Final     Comment:     Coumadin Therapy:  2.0 - 3.0 for INR for all indicators except mechanical heart valves  and antiphospholipid syndromes which should use 2.5 - 3.5.           Assessment/Plan:     1. HCC (hepatocellular carcinoma)    2. Other cirrhosis of liver      Erendira Pretty is a 74 y.o. male withHepatic Cancer    HCC-status post ablation  -needs repeat imaging in January    Cirrhosis- low MELD, well compensated  -Continue to monitor MELD  labs  -Continue with HCC surveillance  -Continue variceal screening-7/2022    RTC in 3 months with preclinic labs and imaging    Anna Tomas MD

## 2019-11-18 NOTE — TELEPHONE ENCOUNTER
Pt didn't seem to know what was going on with the physical therapy he wasn't aware of any f/u he was going to PT tomorrow. But he did want to make a follow up for his ear to see our providers. I informed pt of the date time and location of the appointment he verbalized understanding and will be coming in for the appointment.        ----- Message from Suly Peter sent at 11/18/2019  3:21 PM CST -----  Contact: patient   Sent to wrong [place]. Please see below.   ----- Message -----  From: Suzanne Hassan  Sent: 11/18/2019   3:14 PM CST  To: [facility] Staff    Requesting a call back regarding f/u on physical therapy.Please call back at [phone].      Thanks,  Suzanne Hassan

## 2019-11-21 DIAGNOSIS — I25.10 CORONARY ARTERY DISEASE INVOLVING NATIVE CORONARY ARTERY OF NATIVE HEART WITHOUT ANGINA PECTORIS: Primary | ICD-10-CM

## 2019-11-21 DIAGNOSIS — Z95.1 S/P CABG X 2: ICD-10-CM

## 2019-11-21 DIAGNOSIS — E78.2 MIXED HYPERLIPIDEMIA: ICD-10-CM

## 2019-11-21 DIAGNOSIS — Z79.899 ENCOUNTER FOR LONG-TERM (CURRENT) USE OF MEDICATIONS: ICD-10-CM

## 2019-11-25 RX ORDER — LEVOTHYROXINE SODIUM 137 UG/1
TABLET ORAL
Qty: 60 TABLET | Refills: 5 | Status: SHIPPED | OUTPATIENT
Start: 2019-11-25 | End: 2020-04-22

## 2019-11-25 NOTE — PROGRESS NOTES
Subjective:   Patient: Erendira Pretty [id number], :1945   Visit date:2019 7:54 AM    Chief Complaint:  Other (pressure in left ear )    HPI:  Erendira is a 74 y.o. male who presented to clinic on 19 for pressure in his left ear. This has been on and off for the past 2-3 months. He was tx 2 mo ago with oral steroid which did help but pressure returned. He has a hx of many allergies and takes anti-histamine daily and use flonase. He also has a hx of dizziness, at last appt with audio recommended for VNG, pt has not scheduled/completed yet. No other cold ssx. No relieving factors.         Review of Systems:  -     Allergic/Immunologic: is allergic to lipitor [atorvastatin]; niacin preparations; iodinated contrast media; omeprazole; and prilosec [omeprazole magnesium]..  -     Constitutional: Current temp:      His meds, allergies, medical, surgical, social & family histories were reviewed & updated:  -     He has a current medication list which includes the following prescription(s): accu-chek lux plus meter, accu-chek lux plus test strp, azelastine, budesonide-formoterol 160-4.5 mcg, docusate sodium, finasteride, fluad 1990-2621 (65 yr up)(pf), furosemide, glucosamine/chondr leach a sod, humalog kwikpen insulin, hydrocodone-acetaminophen, insulin, krill oil, lancets, levocetirizine, metoprolol succinate, milk thistle, montelukast, multivit,iron,mins/folic acid, pen needle, diabetic, polyethylene glycol, prednisone, rabeprazole, sildenafil, sildenafil, synthroid, albuterol-ipratropium, and methylprednisolone, and the following Facility-Administered Medications: lactated ringers.  -     He  has a past medical history of Aortic stenosis, Asthma, BPH (benign prostatic hyperplasia), CAD (coronary artery disease), Cirrhosis, Claudication (2014), Colon polyp, COPD (chronic obstructive pulmonary disease), ED (erectile dysfunction), Encounter for blood transfusion, Ex-smoker, Hearing loss NEC, Hepatitis C,  "Hyperlipidemia, Hypertension, Hypothyroid, DELONTE on CPAP, Osteoarthritis, PVD (peripheral vascular disease), and Type 2 diabetes mellitus.   -     He does not have any pertinent problems on file.   -     He  has a past surgical history that includes Knee surgery (Right); Trigger finger release (Left); Carpal tunnel release (Left); Elbow bursa surgery (Right, 2010); Rectal surgery (2012); Eye surgery; Cataract extraction w/ intraocular lens  implant, bilateral (2008); Cardiac catheterization; Colonoscopy (8/2013); Colonoscopy (N/A, 5/30/2016); Transurethral resection of prostate (TURP) without use of laser (N/A, 6/19/2018); Coronary Artery Bypass Graft (CABG) (N/A, 11/5/2018); Endoscopic harvest of vein (Left, 11/5/2018); Cardiac surgery; Catheterization of both left and right heart (N/A, 11/2/2018); Functional endoscopic sinus surgery (FESS) (Bilateral, 3/27/2019); Frontal sinus obliteration (Bilateral, 3/27/2019); Maxillary antrostomy (Bilateral, 3/27/2019); Ethmoidectomy (Bilateral, 3/27/2019); Nasal septoplasty (N/A, 3/27/2019); Knee arthroscopy w/ meniscal repair (Left, 06/2019); Esophagogastroduodenoscopy (N/A, 7/17/2019); Colonoscopy (N/A, 7/17/2019); Computed tomography (N/A, 9/9/2019); and Cataract extraction.  -     He  reports that he quit smoking about 47 years ago. He has a 2.00 pack-year smoking history. He quit smokeless tobacco use about 43 years ago.  His smokeless tobacco use included chew. He reports that he drinks about 2.0 standard drinks of alcohol per week. He reports that he does not use drugs.  -     His family history includes Heart disease in his brother, father, and mother.  -     He is allergic to lipitor [atorvastatin]; niacin preparations; iodinated contrast media; omeprazole; and prilosec [omeprazole magnesium].    Objective:     Physical Exam:  Vitals:  /71   Pulse 86   Ht 5' 8" (1.727 m)   Wt 112.3 kg (247 lb 9.2 oz)   BMI 37.64 kg/m²   Appearance:  Well-developed, " well-nourished.  Communication:  Able to communicate, no hoarseness.  Head & Face:  Normocephalic, atraumatic, no sinus tenderness, normal facial strength.  Eyes:  Extraocular motions intact.  Ears: mild retraction of L TM, intact.   Nose:  No masses/lesions of external nose, nasal mucosa, septum, and turbinates were within normal limits.  Mouth:  No mass/lesion of lips, teeth, gums, hard/soft palate, tongue, tonsils, or oropharynx.  Neck & Lymphatics:  No cervical lymphadenopathy, no neck mass/crepitus/ asymmetry, trachea is midline, no thyroid enlargement/tenderness/mass.  Neuro/Psych: Alert with normal mood and affect.   Abdominal: Normal appearance.   Respiration/Chest:  Symmetric expansion during respiration, normal respiratory effort.  Skin:  Warm and intact  Cardiovascular:  No peripheral vascular edema or varicosities.    Assessment & Plan:   Erendira was seen today for other.    Diagnoses and all orders for this visit:    Dysfunction of left eustachian tube    Non-seasonal allergic rhinitis, unspecified trigger    Other orders  -     methylPREDNISolone (MEDROL DOSEPACK) 4 mg tablet; use as directed    Medrol. Continue Xyzal and Flonase  Receommended VNG in sept, will schedule as well as tymps in 1 week      Porsche Larkin PA-C  [facility]   [facility]  [address]  [place] [address]  P: [phone]  F: [phone]

## 2019-11-26 ENCOUNTER — OFFICE VISIT (OUTPATIENT)
Dept: OTOLARYNGOLOGY | Facility: CLINIC | Age: 74
End: 2019-11-26
Payer: MEDICARE

## 2019-11-26 VITALS
HEIGHT: 68 IN | WEIGHT: 247.56 LBS | BODY MASS INDEX: 37.52 KG/M2 | HEART RATE: 86 BPM | SYSTOLIC BLOOD PRESSURE: 124 MMHG | DIASTOLIC BLOOD PRESSURE: 71 MMHG

## 2019-11-26 DIAGNOSIS — H69.92 DYSFUNCTION OF LEFT EUSTACHIAN TUBE: Primary | ICD-10-CM

## 2019-11-26 DIAGNOSIS — J30.89 NON-SEASONAL ALLERGIC RHINITIS, UNSPECIFIED TRIGGER: ICD-10-CM

## 2019-11-26 PROCEDURE — 1159F PR MEDICATION LIST DOCUMENTED IN MEDICAL RECORD: ICD-10-PCS | Mod: S$GLB,,, | Performed by: PHYSICIAN ASSISTANT

## 2019-11-26 PROCEDURE — 1159F MED LIST DOCD IN RCRD: CPT | Mod: S$GLB,,, | Performed by: PHYSICIAN ASSISTANT

## 2019-11-26 PROCEDURE — 1101F PR PT FALLS ASSESS DOC 0-1 FALLS W/OUT INJ PAST YR: ICD-10-PCS | Mod: CPTII,S$GLB,, | Performed by: PHYSICIAN ASSISTANT

## 2019-11-26 PROCEDURE — 3078F DIAST BP <80 MM HG: CPT | Mod: CPTII,S$GLB,, | Performed by: PHYSICIAN ASSISTANT

## 2019-11-26 PROCEDURE — 1126F PR PAIN SEVERITY QUANTIFIED, NO PAIN PRESENT: ICD-10-PCS | Mod: S$GLB,,, | Performed by: PHYSICIAN ASSISTANT

## 2019-11-26 PROCEDURE — 1101F PT FALLS ASSESS-DOCD LE1/YR: CPT | Mod: CPTII,S$GLB,, | Performed by: PHYSICIAN ASSISTANT

## 2019-11-26 PROCEDURE — 99213 PR OFFICE/OUTPT VISIT, EST, LEVL III, 20-29 MIN: ICD-10-PCS | Mod: S$GLB,,, | Performed by: PHYSICIAN ASSISTANT

## 2019-11-26 PROCEDURE — 99999 PR PBB SHADOW E&M-EST. PATIENT-LVL IV: ICD-10-PCS | Mod: PBBFAC,,, | Performed by: PHYSICIAN ASSISTANT

## 2019-11-26 PROCEDURE — 3074F SYST BP LT 130 MM HG: CPT | Mod: CPTII,S$GLB,, | Performed by: PHYSICIAN ASSISTANT

## 2019-11-26 PROCEDURE — 1126F AMNT PAIN NOTED NONE PRSNT: CPT | Mod: S$GLB,,, | Performed by: PHYSICIAN ASSISTANT

## 2019-11-26 PROCEDURE — 3074F PR MOST RECENT SYSTOLIC BLOOD PRESSURE < 130 MM HG: ICD-10-PCS | Mod: CPTII,S$GLB,, | Performed by: PHYSICIAN ASSISTANT

## 2019-11-26 PROCEDURE — 3078F PR MOST RECENT DIASTOLIC BLOOD PRESSURE < 80 MM HG: ICD-10-PCS | Mod: CPTII,S$GLB,, | Performed by: PHYSICIAN ASSISTANT

## 2019-11-26 PROCEDURE — 99213 OFFICE O/P EST LOW 20 MIN: CPT | Mod: S$GLB,,, | Performed by: PHYSICIAN ASSISTANT

## 2019-11-26 PROCEDURE — 99999 PR PBB SHADOW E&M-EST. PATIENT-LVL IV: CPT | Mod: PBBFAC,,, | Performed by: PHYSICIAN ASSISTANT

## 2019-11-26 RX ORDER — METHYLPREDNISOLONE 4 MG/1
TABLET ORAL
Qty: 1 PACKAGE | Refills: 0 | Status: SHIPPED | OUTPATIENT
Start: 2019-11-26 | End: 2019-12-17

## 2019-11-27 ENCOUNTER — LAB VISIT (OUTPATIENT)
Dept: LAB | Facility: HOSPITAL | Age: 74
End: 2019-11-27
Attending: INTERNAL MEDICINE
Payer: MEDICARE

## 2019-11-27 DIAGNOSIS — I25.10 CORONARY ARTERY DISEASE INVOLVING NATIVE CORONARY ARTERY OF NATIVE HEART WITHOUT ANGINA PECTORIS: ICD-10-CM

## 2019-11-27 DIAGNOSIS — Z79.899 ENCOUNTER FOR LONG-TERM (CURRENT) USE OF MEDICATIONS: ICD-10-CM

## 2019-11-27 DIAGNOSIS — E78.2 MIXED HYPERLIPIDEMIA: ICD-10-CM

## 2019-11-27 DIAGNOSIS — Z95.1 S/P CABG X 2: ICD-10-CM

## 2019-11-27 LAB
ALBUMIN SERPL BCP-MCNC: 3.5 G/DL (ref 3.5–5.2)
ALP SERPL-CCNC: 84 U/L (ref 55–135)
ALT SERPL W/O P-5'-P-CCNC: 21 U/L (ref 10–44)
AST SERPL-CCNC: 27 U/L (ref 10–40)
BILIRUB DIRECT SERPL-MCNC: 0.2 MG/DL (ref 0.1–0.3)
BILIRUB SERPL-MCNC: 0.7 MG/DL (ref 0.1–1)
CHOLEST SERPL-MCNC: 208 MG/DL (ref 120–199)
CHOLEST/HDLC SERPL: 4.6 {RATIO} (ref 2–5)
HDLC SERPL-MCNC: 45 MG/DL (ref 40–75)
HDLC SERPL: 21.6 % (ref 20–50)
LDLC SERPL CALC-MCNC: 120.8 MG/DL (ref 63–159)
NONHDLC SERPL-MCNC: 163 MG/DL
PROT SERPL-MCNC: 7.3 G/DL (ref 6–8.4)
TRIGL SERPL-MCNC: 211 MG/DL (ref 30–150)

## 2019-11-27 PROCEDURE — 80076 HEPATIC FUNCTION PANEL: CPT

## 2019-11-27 PROCEDURE — 82232 ASSAY OF BETA-2 PROTEIN: CPT

## 2019-11-27 PROCEDURE — 80061 LIPID PANEL: CPT

## 2019-11-27 PROCEDURE — 36415 COLL VENOUS BLD VENIPUNCTURE: CPT | Mod: PO

## 2019-12-04 ENCOUNTER — CLINICAL SUPPORT (OUTPATIENT)
Dept: AUDIOLOGY | Facility: CLINIC | Age: 74
End: 2019-12-04
Payer: MEDICARE

## 2019-12-04 DIAGNOSIS — H93.8X2 PRESSURE SENSATION IN LEFT EAR: ICD-10-CM

## 2019-12-04 DIAGNOSIS — R42 DIZZINESS: Primary | ICD-10-CM

## 2019-12-04 PROCEDURE — 92542 POSITIONAL NYSTAGMUS TEST: CPT | Mod: S$GLB,,, | Performed by: AUDIOLOGIST-HEARING AID FITTER

## 2019-12-04 PROCEDURE — 92550 PR TYMPANOMETRY AND REFLEX THRESHOLD MEASUREMENTS: ICD-10-PCS | Mod: S$GLB,,, | Performed by: AUDIOLOGIST-HEARING AID FITTER

## 2019-12-04 PROCEDURE — 92550 TYMPANOMETRY & REFLEX THRESH: CPT | Mod: S$GLB,,, | Performed by: AUDIOLOGIST-HEARING AID FITTER

## 2019-12-04 PROCEDURE — 92552 PR PURE TONE AUDIOMETRY, AIR: ICD-10-PCS | Mod: 52,S$GLB,, | Performed by: AUDIOLOGIST-HEARING AID FITTER

## 2019-12-04 PROCEDURE — 92542 PR POSITIONAL NYSTAGMUS TEST: ICD-10-PCS | Mod: S$GLB,,, | Performed by: AUDIOLOGIST-HEARING AID FITTER

## 2019-12-04 PROCEDURE — 92537 CALORIC VSTBLR TEST W/REC: CPT | Mod: S$GLB,,, | Performed by: AUDIOLOGIST-HEARING AID FITTER

## 2019-12-04 PROCEDURE — 92552 PURE TONE AUDIOMETRY AIR: CPT | Mod: 52,S$GLB,, | Performed by: AUDIOLOGIST-HEARING AID FITTER

## 2019-12-04 PROCEDURE — 92537 PR CALORIC VSTBLR TEST W/REC BITHERMAL: ICD-10-PCS | Mod: S$GLB,,, | Performed by: AUDIOLOGIST-HEARING AID FITTER

## 2019-12-04 NOTE — Clinical Note
Please f/u with patient your plan.  Continues c/o left aural pressureVNG: NL and improved. Tymps: Type A, AUReflexes: Normal AD, Abnormal AS which may suggest middle ear involvement or less likely CN VII disorder.

## 2019-12-04 NOTE — PROGRESS NOTES
"Referring provider: HALEIGH Paredes KENRICK Pretty was seen 2019 for audiologic testing.    Dizzy symptoms are provoked when first arising in morning and when first lying in bed, duration of 10 seconds.  He complains of pressure in the left ear over the past two months. He has been using nasal spray since 2018 following sinus surgery.  No change in hearing.  Patient has been attending VRT at [facility] with [facility].  Patient is no longer progressing in PT and further evaluation was recommended.  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  VNG 10/7/19 revealed 26% left caloric weakness.     Left Air-Conduction Re-screen: Unchanged since previous audiogram 10/15/2019    Tympanograms:  Right ear: Type "A" .70 ml @ -12 daPa.  ECV: 1.11 ml  Left ear: Type "A"  .62 ml @ -11 daPa.  ECV: 1.24 ml    Acoustic Reflexes:  Right ear: Normal ipsilateral and contralateral  Left ear: Elevated ipsilateral and contralateral    VNG:   Spontaneous test: Absent for nystagmusnystagmus .  Static Positional test: Absent for nystagmus.   Haleiwa-Hallpike Left revealed 4 d/s LB nystagmus; suppressed with fixation.   Billy-Hallpike Right revealed 4 d/s RB and 2 d/s UB nystagmus (non-torsional); suppressed with fixation.  Bi-thermal caloric irrigations revealed a 4% caloric weakness in the left ear, which is within normal limits, and 4% directional preponderance to the left, which is within normal limits.  Fixation suppression following caloric irrigations were normal.  RC: 21 d/s    RW: 20 d/s   d/s    LW: 20 d/s  Hallpike was retested without the video goggles: Absent for nystagmus or dizziness.     Impressions: Normal VNG.  Responses are improved as compared to previous VNG 10/7/19.  Negative for BPPV.     Recommendations:  1. ENT   2. Continue home VRT provided by his vestibular therapist    Tracings are scanned into computer.                  "

## 2019-12-05 ENCOUNTER — TELEPHONE (OUTPATIENT)
Dept: OTOLARYNGOLOGY | Facility: CLINIC | Age: 74
End: 2019-12-05

## 2019-12-05 ENCOUNTER — OFFICE VISIT (OUTPATIENT)
Dept: CARDIOLOGY | Facility: CLINIC | Age: 74
End: 2019-12-05
Payer: MEDICARE

## 2019-12-05 VITALS
DIASTOLIC BLOOD PRESSURE: 76 MMHG | WEIGHT: 249.56 LBS | BODY MASS INDEX: 37.82 KG/M2 | HEART RATE: 68 BPM | HEIGHT: 68 IN | SYSTOLIC BLOOD PRESSURE: 132 MMHG

## 2019-12-05 DIAGNOSIS — I35.0 NONRHEUMATIC AORTIC VALVE STENOSIS: Chronic | ICD-10-CM

## 2019-12-05 DIAGNOSIS — I73.9 CLAUDICATION: ICD-10-CM

## 2019-12-05 DIAGNOSIS — Z95.1 S/P CABG X 2: ICD-10-CM

## 2019-12-05 DIAGNOSIS — G47.33 OSA ON CPAP: Chronic | ICD-10-CM

## 2019-12-05 DIAGNOSIS — I25.10 CORONARY ARTERY DISEASE INVOLVING NATIVE CORONARY ARTERY OF NATIVE HEART WITHOUT ANGINA PECTORIS: ICD-10-CM

## 2019-12-05 DIAGNOSIS — I73.9 PVD (PERIPHERAL VASCULAR DISEASE): ICD-10-CM

## 2019-12-05 DIAGNOSIS — I10 ESSENTIAL HYPERTENSION: Primary | ICD-10-CM

## 2019-12-05 DIAGNOSIS — I49.3 PVC (PREMATURE VENTRICULAR CONTRACTION): ICD-10-CM

## 2019-12-05 PROCEDURE — 3078F PR MOST RECENT DIASTOLIC BLOOD PRESSURE < 80 MM HG: ICD-10-PCS | Mod: CPTII,S$GLB,, | Performed by: INTERNAL MEDICINE

## 2019-12-05 PROCEDURE — 1126F PR PAIN SEVERITY QUANTIFIED, NO PAIN PRESENT: ICD-10-PCS | Mod: S$GLB,,, | Performed by: INTERNAL MEDICINE

## 2019-12-05 PROCEDURE — 1101F PT FALLS ASSESS-DOCD LE1/YR: CPT | Mod: CPTII,S$GLB,, | Performed by: INTERNAL MEDICINE

## 2019-12-05 PROCEDURE — 3075F SYST BP GE 130 - 139MM HG: CPT | Mod: CPTII,S$GLB,, | Performed by: INTERNAL MEDICINE

## 2019-12-05 PROCEDURE — 1159F MED LIST DOCD IN RCRD: CPT | Mod: S$GLB,,, | Performed by: INTERNAL MEDICINE

## 2019-12-05 PROCEDURE — 1101F PR PT FALLS ASSESS DOC 0-1 FALLS W/OUT INJ PAST YR: ICD-10-PCS | Mod: CPTII,S$GLB,, | Performed by: INTERNAL MEDICINE

## 2019-12-05 PROCEDURE — 1159F PR MEDICATION LIST DOCUMENTED IN MEDICAL RECORD: ICD-10-PCS | Mod: S$GLB,,, | Performed by: INTERNAL MEDICINE

## 2019-12-05 PROCEDURE — 99999 PR PBB SHADOW E&M-EST. PATIENT-LVL III: ICD-10-PCS | Mod: PBBFAC,,, | Performed by: INTERNAL MEDICINE

## 2019-12-05 PROCEDURE — 3078F DIAST BP <80 MM HG: CPT | Mod: CPTII,S$GLB,, | Performed by: INTERNAL MEDICINE

## 2019-12-05 PROCEDURE — 99214 OFFICE O/P EST MOD 30 MIN: CPT | Mod: S$GLB,,, | Performed by: INTERNAL MEDICINE

## 2019-12-05 PROCEDURE — 99214 PR OFFICE/OUTPT VISIT, EST, LEVL IV, 30-39 MIN: ICD-10-PCS | Mod: S$GLB,,, | Performed by: INTERNAL MEDICINE

## 2019-12-05 PROCEDURE — 99999 PR PBB SHADOW E&M-EST. PATIENT-LVL III: CPT | Mod: PBBFAC,,, | Performed by: INTERNAL MEDICINE

## 2019-12-05 PROCEDURE — 1126F AMNT PAIN NOTED NONE PRSNT: CPT | Mod: S$GLB,,, | Performed by: INTERNAL MEDICINE

## 2019-12-05 PROCEDURE — 3075F PR MOST RECENT SYSTOLIC BLOOD PRESS GE 130-139MM HG: ICD-10-PCS | Mod: CPTII,S$GLB,, | Performed by: INTERNAL MEDICINE

## 2019-12-05 NOTE — TELEPHONE ENCOUNTER
Called and left pt a message to return call to schedule.        ----- Message from Porsche Larkin PA-C sent at 12/5/2019  9:33 AM CST -----  Please make pt a f/u with one of the MD's, thank you!     ----- Message -----  From: Lobo Myers, CCC-A  Sent: 12/4/2019   2:09 PM CST  To: Porsche Larkin PA-C    Please f/u with patient your plan.  Continues c/o left aural pressure  VNG: NL and improved.   Tymps: Type A, AU  Reflexes: Normal AD, Abnormal AS which may suggest middle ear involvement or less likely CN VII disorder.

## 2019-12-05 NOTE — PROGRESS NOTES
Subjective:   Patient ID:  Erendira Pretty is a 74 y.o. male who presents for follow-up of Essential hypertension (6 month f/u)  Pt is s/p liver  procedure without complications.Patient denies CP, angina or anginal equivalent.  Liver w/u on progress. LDL and TG increased from 3 months ago, no medication changes    Hypertension   This is a chronic problem. The current episode started more than 1 year ago. The problem has been gradually improving since onset. The problem is controlled. Pertinent negatives include no chest pain, palpitations or shortness of breath. Past treatments include beta blockers and diuretics. The current treatment provides moderate improvement. There are no compliance problems.    Coronary Artery Disease   Presents for follow-up visit. Pertinent negatives include no chest pain, chest pressure, chest tightness, dizziness, leg swelling, muscle weakness, palpitations, shortness of breath or weight gain. The symptoms have been stable. Compliance with diet is variable. Compliance with exercise is variable. Compliance with medications is good.       Review of Systems   Constitution: Negative. Negative for weight gain.   HENT: Negative.    Eyes: Negative.    Cardiovascular: Negative.  Negative for chest pain, leg swelling and palpitations.   Respiratory: Negative.  Negative for chest tightness and shortness of breath.    Endocrine: Negative.    Hematologic/Lymphatic: Negative.    Skin: Negative.    Musculoskeletal: Negative for muscle weakness.   Gastrointestinal: Negative.    Genitourinary: Negative.    Neurological: Negative.  Negative for dizziness.   Psychiatric/Behavioral: Negative.    Allergic/Immunologic: Negative.      Family History   Problem Relation Age of Onset    Heart disease Mother     Heart disease Father     Heart disease Brother     Colon cancer Neg Hx      Past Medical History:   Diagnosis Date    Aortic stenosis     Asthma     BPH (benign prostatic hyperplasia)     CAD  (coronary artery disease)     40% lesion ;lazo    Cirrhosis     Claudication 2014    Colon polyp     COPD (chronic obstructive pulmonary disease)     ED (erectile dysfunction)     Encounter for blood transfusion     Ex-smoker     Hearing loss NEC     Hepatitis C     Cured;reji brown     Hyperlipidemia     Hypertension     Hypothyroid     s/p tx graves    DELONTE on CPAP     Osteoarthritis     PVD (peripheral vascular disease)     Type 2 diabetes mellitus      Social History     Socioeconomic History    Marital status:      Spouse name: YAEL    Number of children: 2    Years of education: Not on file    Highest education level: Not on file   Occupational History    Not on file   Social Needs    Financial resource strain: Not on file    Food insecurity:     Worry: Not on file     Inability: Not on file    Transportation needs:     Medical: Not on file     Non-medical: Not on file   Tobacco Use    Smoking status: Former Smoker     Packs/day: 0.50     Years: 4.00     Pack years: 2.00     Last attempt to quit: 1972     Years since quittin.5    Smokeless tobacco: Former User     Types: Chew     Quit date: 1976   Substance and Sexual Activity    Alcohol use: Yes     Alcohol/week: 2.0 standard drinks     Types: 2 Cans of beer per week     Comment: daily  No alcohol prior to surgery    Drug use: No    Sexual activity: Yes     Partners: Female   Lifestyle    Physical activity:     Days per week: Not on file     Minutes per session: Not on file    Stress: Not on file   Relationships    Social connections:     Talks on phone: Not on file     Gets together: Not on file     Attends Latter-day service: Not on file     Active member of club or organization: Not on file     Attends meetings of clubs or organizations: Not on file     Relationship status: Not on file   Other Topics Concern    Not on file   Social History Narrative    . Lives with spouse.  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      Current Outpatient Medications on File Prior to Visit   Medication Sig Dispense Refill    ACCU-CHEK GRACE PLUS METER Misc AS DIRECTED  0    ACCU-CHEK GRACE PLUS TEST STRP Strp TEST THREE TIMES A  strip 11    albuterol-ipratropium (DUO-NEB) 2.5 mg-0.5 mg/3 mL nebulizer solution Take 3 mLs by nebulization 2 (two) times daily. Rescue 120 vial 5    azelastine (ASTELIN) 137 mcg (0.1 %) nasal spray 2 sprays (274 mcg total) by Nasal route 2 (two) times daily. 30 mL 6    budesonide-formoterol 160-4.5 mcg (SYMBICORT) 160-4.5 mcg/actuation HFAA INHALE 2 PUFFS INTO THE LUNGS TWO TIMES A DAY 10.2 g 11    docusate sodium (COLACE) 100 MG capsule Take 1 capsule (100 mg total) by mouth 2 (two) times daily. 60 capsule 0    finasteride (PROSCAR) 5 mg tablet Take 1 tablet (5 mg total) by mouth once daily. 30 tablet 11    furosemide (LASIX) 40 MG tablet Take 1 tablet (40 mg) by mouth 2 times a day and 1 extra if needed for 14 days. 90 tablet 5    GLUCOSAMINE HCL/CHONDR ROSARIO A NA (OSTEO BI-FLEX ORAL) Take 1 tablet by mouth 2 (two) times daily.       HUMALOG KWIKPEN INSULIN 100 unit/mL pen INJECT 3-4 UNITS INTO THE SKIN THREE TIMES DAILY BEFORE MEALS. 3 mL 11    HYDROcodone-acetaminophen (NORCO)  mg per tablet Take 1 tablet by mouth every 4 (four) hours as needed for Pain. 28 tablet 0    insulin glargine (LANTUS SOLOSTAR) 100 unit/mL (3 mL) InPn pen Inject 40 Units into the skin once daily. 5 Syringe 6    krill oil 500 mg Cap Take by mouth.      lancets (ACCU-CHEK FASTCLIX LANCET DRUM) Misc TEST TWO TIMES A  each 5    levocetirizine (XYZAL) 5 MG tablet Take 1 tablet (5 mg total) by mouth every evening. 30 tablet 11    metoprolol succinate (TOPROL-XL) 50 MG 24 hr tablet Take 1 tablet (50 mg total) by mouth 2 (two) times daily. 60 tablet 11    milk thistle 175 mg tablet Take 175 mg by mouth once daily.      montelukast  "(SINGULAIR) 10 mg tablet Take 1 tablet (10 mg total) by mouth once daily. 30 tablet 11    MV,CA,IRON,MIN/FA/PHYTOSTEROL (CENTRUM SPECIALIST HEART ORAL) Take 1 tablet by mouth once daily.       pen needle, diabetic (BD INSULIN PEN NEEDLE UF MINI) 31 gauge x 3/16" Ndle USE AS DIRECTED 100 each 11    polyethylene glycol (GLYCOLAX) 17 gram/dose powder Take 17 g ( 1 capful) by mouth once daily. 527 g 0    predniSONE (DELTASONE) 20 MG tablet Take 1 tablet (20 mg total) by mouth As instructed. 3tab po daily x3days,2tabs po daily x3days,1tab po daily x3days,1/2tab po daily x3days 25 tablet 0    RABEprazole (ACIPHEX) 20 mg tablet Take 20 mg by mouth once daily.      sildenafil (REVATIO) 20 mg Tab Take 2 tables by mouth one hour prior to intercourse.  Max 2 per 24 hours 30 tablet 3    SYNTHROID 137 mcg Tab tablet TAKE 2 TABLETS BY MOUTH BEFORE BREAKFAST. 60 tablet 5    FLUAD 8268-8779, 65 YR UP,,PF, 45 mcg (15 mcg x 3)/0.5 mL Syrg       methylPREDNISolone (MEDROL DOSEPACK) 4 mg tablet use as directed (Patient not taking: Reported on 12/5/2019) 1 Package 0    sildenafil (REVATIO) 20 mg Tab Take 2 tablets one hour prior to intercourse. Max two per 24 hours 90 tablet 11     Current Facility-Administered Medications on File Prior to Visit   Medication Dose Route Frequency Provider Last Rate Last Dose    lactated ringers infusion   Intravenous Continuous Omaira Theodore MD         Review of patient's allergies indicates:   Allergen Reactions    Lipitor [atorvastatin] Other (See Comments)     Muscle aches and pains as well as fatigue.     Niacin preparations Other (See Comments)     Causes broken blood vessels and bruises    Iodinated contrast media Hives     Tachycardia    Omeprazole Hives and Itching    Prilosec [omeprazole magnesium] Hives and Itching       Objective:     Physical Exam   Constitutional: He is oriented to person, place, and time. He appears well-developed and well-nourished.   HENT: "   Head: Normocephalic and atraumatic.   Eyes: Pupils are equal, round, and reactive to light. Conjunctivae are normal.   Neck: Normal range of motion. Neck supple.   Cardiovascular: Normal rate, regular rhythm, normal heart sounds and intact distal pulses.   Pulmonary/Chest: Effort normal and breath sounds normal.   Abdominal: Soft. Bowel sounds are normal.   Neurological: He is alert and oriented to person, place, and time.   Skin: Skin is warm and dry.   Psychiatric: He has a normal mood and affect.   Nursing note and vitals reviewed.      Assessment:     1. Essential hypertension    2. PVC (premature ventricular contraction)    3. Coronary artery disease involving native coronary artery of native heart without angina pectoris    4. PVD (peripheral vascular disease)    5. Claudication    6. S/P CABG x 2    7. Nonrheumatic aortic valve stenosis    8. DELONTE on CPAP        Plan:     Essential hypertension    PVC (premature ventricular contraction)    Coronary artery disease involving native coronary artery of native heart without angina pectoris    PVD (peripheral vascular disease)    Claudication    S/P CABG x 2    Nonrheumatic aortic valve stenosis    DELONTE on CPAP      Continue metoprolol, lasix-htn  Continue asa- cad  Recheck lipids 3 months  Weight loss

## 2019-12-06 ENCOUNTER — HOSPITAL ENCOUNTER (OUTPATIENT)
Dept: RADIOLOGY | Facility: HOSPITAL | Age: 74
Discharge: HOME | End: 2019-12-06
Attending: PODIATRIST
Payer: MEDICARE

## 2019-12-06 ENCOUNTER — OFFICE VISIT (OUTPATIENT)
Dept: PODIATRY | Facility: CLINIC | Age: 74
End: 2019-12-06
Payer: MEDICARE

## 2019-12-06 VITALS
WEIGHT: 249.56 LBS | DIASTOLIC BLOOD PRESSURE: 79 MMHG | HEART RATE: 89 BPM | BODY MASS INDEX: 37.82 KG/M2 | SYSTOLIC BLOOD PRESSURE: 130 MMHG | RESPIRATION RATE: 17 BRPM | HEIGHT: 68 IN

## 2019-12-06 DIAGNOSIS — M79.672 PAIN IN LEFT FOOT: ICD-10-CM

## 2019-12-06 DIAGNOSIS — M19.072 OSTEOARTHRITIS OF LEFT ANKLE OR FOOT: ICD-10-CM

## 2019-12-06 DIAGNOSIS — M19.072 OSTEOARTHRITIS OF LEFT ANKLE OR FOOT: Primary | ICD-10-CM

## 2019-12-06 PROCEDURE — 1101F PR PT FALLS ASSESS DOC 0-1 FALLS W/OUT INJ PAST YR: ICD-10-PCS | Mod: CPTII,S$GLB,, | Performed by: PODIATRIST

## 2019-12-06 PROCEDURE — 1126F PR PAIN SEVERITY QUANTIFIED, NO PAIN PRESENT: ICD-10-PCS | Mod: S$GLB,,, | Performed by: PODIATRIST

## 2019-12-06 PROCEDURE — 73630 X-RAY EXAM OF FOOT: CPT | Mod: TC,FY,PO,LT

## 2019-12-06 PROCEDURE — 3075F SYST BP GE 130 - 139MM HG: CPT | Mod: CPTII,S$GLB,, | Performed by: PODIATRIST

## 2019-12-06 PROCEDURE — 1101F PT FALLS ASSESS-DOCD LE1/YR: CPT | Mod: CPTII,S$GLB,, | Performed by: PODIATRIST

## 2019-12-06 PROCEDURE — 73630 XR FOOT COMPLETE 3 VIEW LEFT: ICD-10-PCS | Mod: 26,LT,, | Performed by: RADIOLOGY

## 2019-12-06 PROCEDURE — 99999 PR PBB SHADOW E&M-EST. PATIENT-LVL III: ICD-10-PCS | Mod: PBBFAC,,, | Performed by: PODIATRIST

## 2019-12-06 PROCEDURE — 1159F MED LIST DOCD IN RCRD: CPT | Mod: S$GLB,,, | Performed by: PODIATRIST

## 2019-12-06 PROCEDURE — 73630 X-RAY EXAM OF FOOT: CPT | Mod: 26,LT,, | Performed by: RADIOLOGY

## 2019-12-06 PROCEDURE — 99203 PR OFFICE/OUTPT VISIT, NEW, LEVL III, 30-44 MIN: ICD-10-PCS | Mod: S$GLB,,, | Performed by: PODIATRIST

## 2019-12-06 PROCEDURE — 3075F PR MOST RECENT SYSTOLIC BLOOD PRESS GE 130-139MM HG: ICD-10-PCS | Mod: CPTII,S$GLB,, | Performed by: PODIATRIST

## 2019-12-06 PROCEDURE — 1126F AMNT PAIN NOTED NONE PRSNT: CPT | Mod: S$GLB,,, | Performed by: PODIATRIST

## 2019-12-06 PROCEDURE — 99999 PR PBB SHADOW E&M-EST. PATIENT-LVL III: CPT | Mod: PBBFAC,,, | Performed by: PODIATRIST

## 2019-12-06 PROCEDURE — 99203 OFFICE O/P NEW LOW 30 MIN: CPT | Mod: S$GLB,,, | Performed by: PODIATRIST

## 2019-12-06 PROCEDURE — 3078F DIAST BP <80 MM HG: CPT | Mod: CPTII,S$GLB,, | Performed by: PODIATRIST

## 2019-12-06 PROCEDURE — 3078F PR MOST RECENT DIASTOLIC BLOOD PRESSURE < 80 MM HG: ICD-10-PCS | Mod: CPTII,S$GLB,, | Performed by: PODIATRIST

## 2019-12-06 PROCEDURE — 1159F PR MEDICATION LIST DOCUMENTED IN MEDICAL RECORD: ICD-10-PCS | Mod: S$GLB,,, | Performed by: PODIATRIST

## 2019-12-06 RX ORDER — DICLOFENAC SODIUM 10 MG/G
2 GEL TOPICAL 4 TIMES DAILY
Qty: 100 G | Refills: 5 | Status: SHIPPED | OUTPATIENT
Start: 2019-12-06 | End: 2020-03-09

## 2019-12-06 NOTE — PROGRESS NOTES
Subjective:       Patient ID: Erendira Pretty is a 74 y.o. male.    Chief Complaint: Foot Pain (Right has knot on top of foot, rates pain 10/10, wears tennis, diabetic, PCP Dr. Roblero)      HPI: Erendira Pretty presents to the clinic today for evaluation concerning stated moderate pains to the left foot/ankle at the dorsal medial. Patient states pains are approx. 10/10. Pains are described as moderate to achy. Pains have been present for duration of several months. Patient states the pains are exacerbated with walking and standing and prolonged activities. States no prior medical evaluation by a MD/DO/DPM/NP. States no NSAIDs. Trauma is not stated. Patient's Primary Care Provider is Cortes Roblero MD. Patient has been using topical Voltaren gel.  No oral NSAIDs due to several comorbidities and other medications.    Review of patient's allergies indicates:   Allergen Reactions    Lipitor [atorvastatin] Other (See Comments)     Muscle aches and pains as well as fatigue.     Niacin preparations Other (See Comments)     Causes broken blood vessels and bruises    Iodinated contrast media Hives     Tachycardia    Omeprazole Hives and Itching    Prilosec [omeprazole magnesium] Hives and Itching       Past Medical History:   Diagnosis Date    Aortic stenosis     Asthma     BPH (benign prostatic hyperplasia)     CAD (coronary artery disease)     40% lesion 2002;lazo    Cirrhosis     Claudication 4/9/2014    Colon polyp     COPD (chronic obstructive pulmonary disease)     ED (erectile dysfunction)     Encounter for blood transfusion     Ex-smoker     Hearing loss NEC     Hepatitis C     Cured;reji brown 2015    Hyperlipidemia     Hypertension     Hypothyroid     s/p tx graves    DELONTE on CPAP     Osteoarthritis     PVD (peripheral vascular disease)     Type 2 diabetes mellitus        Family History   Problem Relation Age of Onset    Heart disease Mother     Heart disease Father     Heart  disease Brother     Colon cancer Neg Hx        Social History     Socioeconomic History    Marital status:      Spouse name: YAEL    Number of children: 2    Years of education: Not on file    Highest education level: Not on file   Occupational History    Not on file   Social Needs    Financial resource strain: Not on file    Food insecurity:     Worry: Not on file     Inability: Not on file    Transportation needs:     Medical: Not on file     Non-medical: Not on file   Tobacco Use    Smoking status: Former Smoker     Packs/day: 0.50     Years: 4.00     Pack years: 2.00     Last attempt to quit: 1972     Years since quittin.5    Smokeless tobacco: Former User     Types: Chew     Quit date: 1976   Substance and Sexual Activity    Alcohol use: Yes     Alcohol/week: 2.0 standard drinks     Types: 2 Cans of beer per week     Comment: daily  No alcohol prior to surgery    Drug use: No    Sexual activity: Yes     Partners: Female   Lifestyle    Physical activity:     Days per week: Not on file     Minutes per session: Not on file    Stress: Not on file   Relationships    Social connections:     Talks on phone: Not on file     Gets together: Not on file     Attends Scientologist service: Not on file     Active member of club or organization: Not on file     Attends meetings of clubs or organizations: Not on file     Relationship status: Not on file   Other Topics Concern    Not on file   Social History Narrative    . Lives with spouse. 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        Past Surgical History:   Procedure Laterality Date    CARDIAC CATHETERIZATION      CARDIAC SURGERY      CARPAL TUNNEL RELEASE Left     CATARACT EXTRACTION      CATARACT EXTRACTION W/ INTRAOCULAR LENS  IMPLANT, BILATERAL      CATHETERIZATION OF BOTH LEFT AND RIGHT HEART N/A 2018    Procedure: CATHETERIZATION, HEART, BOTH LEFT AND RIGHT;  Surgeon:  Frank Gallardo MD;  Location: [facility] CATH LAB;  Service: Cardiology;  Laterality: N/A;    COLONOSCOPY  8/2013    COLONOSCOPY N/A 5/30/2016    Procedure: COLONOSCOPY;  Surgeon: Anna Tomas MD;  Location: [facility] ENDO;  Service: Endoscopy;  Laterality: N/A;    COLONOSCOPY N/A 7/17/2019    Procedure: COLONOSCOPY;  Surgeon: David Carter III, MD;  Location: [facility] ENDO;  Service: Endoscopy;  Laterality: N/A;    COMPUTED TOMOGRAPHY N/A 9/9/2019    Procedure: CT (COMPUTED TOMOGRAPHY);  Surgeon: [facility];  Location: [facility] OR;  Service: General;  Laterality: N/A;  Microwave ablation to be done in CT.  Need CRNA and cart    CORONARY ARTERY BYPASS GRAFT (CABG) N/A 11/5/2018    Procedure: CORONARY ARTERY BYPASS GRAFT (CABG);  Surgeon: Bear De Luna MD;  Location: [facility] OR;  Service: Cardiothoracic;  Laterality: N/A;  TWO VESSEL BYPASS WITH AORTOTOMY AND EXCISION OF ATHEROSCLEROTIC PLAQUE    ELBOW BURSA SURGERY Right 2010    ENDOSCOPIC HARVEST OF VEIN Left 11/5/2018    Procedure: SURGICAL PROCUREMENT, VEIN, ENDOSCOPIC;  Surgeon: Bear De Luna MD;  Location: [facility] OR;  Service: Cardiothoracic;  Laterality: Left;    ESOPHAGOGASTRODUODENOSCOPY N/A 7/17/2019    Procedure: ESOPHAGOGASTRODUODENOSCOPY (EGD);  Surgeon: David Carter III, MD;  Location: [facility] ENDO;  Service: Endoscopy;  Laterality: N/A;    ETHMOIDECTOMY Bilateral 3/27/2019    Procedure: ETHMOIDECTOMY;  Surgeon: Alejandro Parkinson MD;  Location: [facility] OR;  Service: ENT;  Laterality: Bilateral;    EYE SURGERY      FRONTAL SINUS OBLITERATION Bilateral 3/27/2019    Procedure: SINUSOTOMY, FRONTAL SINUS, OBLITERATIVE;  Surgeon: Alejandro Parkinson MD;  Location: [facility] OR;  Service: ENT;  Laterality: Bilateral;    FUNCTIONAL ENDOSCOPIC SINUS SURGERY (FESS) Bilateral 3/27/2019    Procedure: FESS (FUNCTIONAL ENDOSCOPIC SINUS SURGERY);  Surgeon: Alejandro Parkinson MD;  Location: [facility] OR;  Service: ENT;  Laterality: Bilateral;  Left Keila bullosa resection     KNEE  "ARTHROSCOPY W/ MENISCAL REPAIR Left 06/2019    dr boykin    KNEE SURGERY Right     MAXILLARY ANTROSTOMY Bilateral 3/27/2019    Procedure: MAXILLARY ANTROSTOMY;  Surgeon: Alejandro Parkinson MD;  Location: [facility] OR;  Service: ENT;  Laterality: Bilateral;    NASAL SEPTOPLASTY N/A 3/27/2019    Procedure: SEPTOPLASTY, NOSE;  Surgeon: Alejandro Parkinson MD;  Location: [facility] OR;  Service: ENT;  Laterality: N/A;    RECTAL SURGERY  2012    TRANSURETHRAL RESECTION OF PROSTATE (TURP) WITHOUT USE OF LASER N/A 6/19/2018    Procedure: TURP, WITHOUT USING LASER;  Surgeon: Cooper Cordova IV, MD;  Location: [facility] OR;  Service: Urology;  Laterality: N/A;    TRIGGER FINGER RELEASE Left        Review of Systems   Constitutional: Negative for chills, fatigue and fever.   HENT: Negative for hearing loss.    Eyes: Negative for photophobia and visual disturbance.   Respiratory: Negative for cough, chest tightness, shortness of breath and wheezing.    Cardiovascular: Negative for chest pain and palpitations.   Gastrointestinal: Negative for constipation, diarrhea, nausea and vomiting.   Endocrine: Negative for cold intolerance and heat intolerance.   Genitourinary: Negative for flank pain.   Musculoskeletal: Positive for gait problem. Negative for neck pain and neck stiffness.   Skin: Negative for wound.   Neurological: Negative for light-headedness and headaches.   Psychiatric/Behavioral: Negative for sleep disturbance.         Objective:   /79 (BP Location: Right arm, Patient Position: Sitting, BP Method: Medium (Automatic))   Pulse 89   Resp 17   Ht 5' 8" (1.727 m)   Wt 113.2 kg (249 lb 9 oz)   BMI 37.95 kg/m²     LOWER EXTREMITY PHYSICAL EXAMINATION    VASCULAR: On the left foot, the dorsalis pedis pulse is 2/4 and the posterior tibial pulse is 1/4. Capillary refill time is less than 3 seconds. Hair growth is present on the dorsum of the foot and at the digits. No rubor is present. Proximal to distal temperature is warm to " warm.    DERMATOLOGY: Skin is supple, dry and intact. No ulcerations or wounds are noted. No cellulitis is noted.    NEUROLOGY: Protective sensation is intact via 5.07 [place] Sandra monofilament. Proprioception is intact. Sensation to light touch is intact.     ORTHOPEDIC: Palpable bone spurring atop the left midfoot at the 1st-3rd TMTJ. Moderate discomfort is noted. Stress abduction or adduction of the midfoot is not painful.  Gait pattern is antalgic.    Assessment:     1. Osteoarthritis of left ankle or foot    2. Pain in left foot          Plan:     Osteoarthritis of left ankle or foot  -     X-Ray Foot Complete Left; Future; Expected date: 12/06/2019  -     diclofenac sodium (VOLTAREN) 1 % Gel; Apply 2 g topically 4 (four) times daily.  Dispense: 100 g; Refill: 5    Pain in left foot  -     X-Ray Foot Complete Left; Future; Expected date: 12/06/2019  -     diclofenac sodium (VOLTAREN) 1 % Gel; Apply 2 g topically 4 (four) times daily.  Dispense: 100 g; Refill: 5        Thorough discussion is had with the patient today, concerning the diagnosis, its etiology, and the treatment algorithm at present.  XRAYS are reviewed in detail with the patient. All questions and concerns regarding findings and its/their implications are outlined and discussed.  Prescription is written for refills on Diclofenac topical gel.          Future Appointments   Date Time Provider Department Center   12/6/2019  1:15 PM [facility] XR1 [facility] XRAY [place]   1/9/2020  9:10 AM LABORATORY, [place] [facility] LAB [place]   1/9/2020 10:15 AM [facility] MRI [facility] MRI [place]   1/9/2020 11:10 AM SPECIMEN, [place] [facility] SPECLAB [place]   1/16/2020 10:30 AM CARDIOVASCULAR, [facility] SPECARD [facility]   3/2/2020  7:40 AM Cortes Roblero MD [facility] IM [facility]   3/5/2020 10:50 AM LABORATORY, [place] [facility] LAB [place]   3/11/2020  9:00 AM Anna Tomas MD [facility] GASTRO [facility]   4/3/2020  8:30 AM [facility] XR2 [facility] XRAY [facility]    4/3/2020  8:45 AM PULMONARY LAB 2, [facility] PULMFUN [place]   4/3/2020  9:20 AM Rishi Molina MD [facility] PULMSVC [place]   7/20/2020  2:20 PM Cooper Cordova IV, MD ON UROLOGY  [facility]

## 2019-12-09 ENCOUNTER — PATIENT OUTREACH (OUTPATIENT)
Dept: PULMONOLOGY | Facility: CLINIC | Age: 74
End: 2019-12-09

## 2019-12-09 RX ORDER — LEVOCETIRIZINE DIHYDROCHLORIDE 5 MG/1
TABLET, FILM COATED ORAL
Qty: 30 TABLET | Refills: 11 | Status: SHIPPED | OUTPATIENT
Start: 2019-12-09 | End: 2020-03-09

## 2019-12-09 NOTE — TELEPHONE ENCOUNTER
Chronic Disease Management  Called patient to complete Pulmonary Disease Management Questionnaire.  Scheduled follow up appointment for 1/16/20 at 1130.    Time  spent with patient:  4 Minutes

## 2019-12-16 NOTE — PROGRESS NOTES
Subjective:   Patient: Erendira Pretty [id number], :1945   Visit date:2019 7:54 AM    Chief Complaint:  ear pressure    Surgery:          3/27/19     Sinuses operated/OR findings:             Full house     Diffuse polyps.  No purulence     Final path:         FINAL PATHOLOGIC DIAGNOSIS  Nasal contents:  Chronic sinusitis with focally increased eosinophils (up to 30 per high-power field).  Fragments of benign bone and cartilage.  OMCBR  Diagnosed by: Jh Moreno M.D.  (Electronically Signed: 2019 09:29:10)     OR Culture:     na    HPI:  Erendira is a 74 y.o. male who presented to clinic on seen by Porsche Larkin PA-C 19 for L ETD tx with Medrol, Xyzal, and Flonase.  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  VNG 10/7/19 revealed 26% left caloric weakness.  Patient has been attending VRT at [facility] with Cortes.  Patient was no longer progressing in PT and further evaluation was recommended.  VNG and Tymps on 19 were WNL. He rtc on 19.  Vertigo much better.  Still with mild vertigo with rapid mvmt.  Left ear with constant pressure.    ESS 3/2019 with significant polyps. Having significant drainage. Using Astelin, Singulair and Xyzal.       Review of Systems:  -     Allergic/Immunologic: is allergic to lipitor [atorvastatin]; niacin preparations; iodinated contrast media; omeprazole; and prilosec [omeprazole magnesium]..  -     Constitutional: Current temp: 97.7 °F (36.5 °C)    His meds, allergies, medical, surgical, social & family histories were reviewed & updated:  -     He has a current medication list which includes the following prescription(s): accu-chek lux plus meter, accu-chek lux plus test strp, budesonide-formoterol 160-4.5 mcg, diclofenac sodium, docusate sodium, finasteride, fluad [id number] (65 yr up)(pf), furosemide, glucosamine/chondr leach a sod, humalog kwikpen insulin, hydrocodone-acetaminophen, insulin, krill oil, lancets, levocetirizine,  methylprednisolone, metoprolol succinate, milk thistle, montelukast, multivit,iron,mins/folic acid, pen needle, diabetic, polyethylene glycol, prednisone, rabeprazole, sildenafil, sildenafil, synthroid, albuterol-ipratropium, amoxicillin-clavulanate 875-125mg, azelastine, and mometasone, and the following Facility-Administered Medications: lactated ringers.  -     He  has a past medical history of Aortic stenosis, Asthma, BPH (benign prostatic hyperplasia), CAD (coronary artery disease), Cirrhosis, Claudication (4/9/2014), Colon polyp, COPD (chronic obstructive pulmonary disease), ED (erectile dysfunction), Encounter for blood transfusion, Ex-smoker, Hearing loss NEC, Hepatitis C, Hyperlipidemia, Hypertension, Hypothyroid, DELONTE on CPAP, Osteoarthritis, PVD (peripheral vascular disease), and Type 2 diabetes mellitus.   -     He does not have any pertinent problems on file.   -     He  has a past surgical history that includes Knee surgery (Right); Trigger finger release (Left); Carpal tunnel release (Left); Elbow bursa surgery (Right, 2010); Rectal surgery (2012); Eye surgery; Cataract extraction w/ intraocular lens  implant, bilateral (2008); Cardiac catheterization; Colonoscopy (8/2013); Colonoscopy (N/A, 5/30/2016); Transurethral resection of prostate (TURP) without use of laser (N/A, 6/19/2018); Coronary Artery Bypass Graft (CABG) (N/A, 11/5/2018); Endoscopic harvest of vein (Left, 11/5/2018); Cardiac surgery; Catheterization of both left and right heart (N/A, 11/2/2018); Functional endoscopic sinus surgery (FESS) (Bilateral, 3/27/2019); Frontal sinus obliteration (Bilateral, 3/27/2019); Maxillary antrostomy (Bilateral, 3/27/2019); Ethmoidectomy (Bilateral, 3/27/2019); Nasal septoplasty (N/A, 3/27/2019); Knee arthroscopy w/ meniscal repair (Left, 06/2019); Esophagogastroduodenoscopy (N/A, 7/17/2019); Colonoscopy (N/A, 7/17/2019); Computed tomography (N/A, 9/9/2019); and Cataract extraction.  -     He  reports that  "he quit smoking about 47 years ago. He has a 2.00 pack-year smoking history. He quit smokeless tobacco use about 43 years ago.  His smokeless tobacco use included chew. He reports that he drinks about 2.0 standard drinks of alcohol per week. He reports that he does not use drugs.  -     His family history includes Heart disease in his brother, father, and mother.  -     He is allergic to lipitor [atorvastatin]; niacin preparations; iodinated contrast media; omeprazole; and prilosec [omeprazole magnesium].    Objective:     Physical Exam:  Vitals:  /81 (BP Location: Left arm, Patient Position: Sitting, BP Method: Large (Automatic))   Pulse 69   Temp 97.7 °F (36.5 °C)   Ht 5' 8" (1.727 m)   Wt 113.4 kg (250 lb)   BMI 38.01 kg/m²   Appearance:  Well-developed, well-nourished.  Communication:  Able to communicate, no hoarseness.  Head & Face:  Normocephalic, atraumatic, no sinus tenderness, normal facial strength.  Eyes:  Extraocular motions intact.  Ears: mild retraction of L TM, intact.   Nose:  No masses/lesions of external nose, nasal mucosa, septum, and turbinates were within normal limits.  Mouth:  No mass/lesion of lips, teeth, gums, hard/soft palate, tongue, tonsils, or oropharynx.  Crepitus in left tmj.   Neck & Lymphatics:  No cervical lymphadenopathy, no neck mass/crepitus/ asymmetry, trachea is midline, no thyroid enlargement/tenderness/mass.  Neuro/Psych: Alert with normal mood and affect.   Abdominal: Normal appearance.   Respiration/Chest:  Symmetric expansion during respiration, normal respiratory effort.  Skin:  Warm and intact  Cardiovascular:  No peripheral vascular edema or varicosities.      Left Air-Conduction Re-screen: Unchanged since previous audiogram 10/15/2019     Tympanograms:  Right ear: Type "A" .70 ml @ -12 daPa.  ECV: 1.11 ml  Left ear: Type "A"  .62 ml @ -11 daPa.  ECV: 1.24 ml     Acoustic Reflexes:  Right ear: Normal ipsilateral and contralateral  Left ear: Elevated " ipsilateral and contralateral     VNG:   Spontaneous test: Absent for nystagmusnystagmus .  Static Positional test: Absent for nystagmus.   [place]-Hallpike Left revealed 4 d/s LB nystagmus; suppressed with fixation.   Billy-Hallpike Right revealed 4 d/s RB and 2 d/s UB nystagmus (non-torsional); suppressed with fixation.  Bi-thermal caloric irrigations revealed a 4% caloric weakness in the left ear, which is within normal limits, and 4% directional preponderance to the left, which is within normal limits.  Fixation suppression following caloric irrigations were normal.  RC: 21 d/s                                           RW: 20 d/s   d/s                                            LW: 20 d/s  Hallpike was retested without the video goggles: Absent for nystagmus or dizziness.      Impressions: Normal VNG.  Responses are improved as compared to previous VNG 10/7/19.  Negative for BPPV.      Recommendations:  1. ENT   2. Continue home VRT provided by his vestibular therapist     Tracings are scanned into computer.                    Assessment & Plan:   Erendira was seen today for ear pressure.    Diagnoses and all orders for this visit:    Vestibulopathy of left ear  Comments:  resolved      Arthralgia of left temporomandibular joint    Left ear pain    Chronic pansinusitis  -     mometasone (NASONEX) 50 mcg/actuation nasal spray; 2 sprays by Nasal route once daily.  -     amoxicillin-clavulanate 875-125mg (AUGMENTIN) 875-125 mg per tablet; Take 1 tablet by mouth 2 (two) times daily. for 10 days       TMJ- recommend dmd for     Vestibulopathy- much improved symptomatically.  Resolved by vng.  Continue home exercises    Sinus- doing fairly well.  Add nasonex. Short course of augmentin             Alejandro Parkinson MD, FACS  [facility]   [facility]  [address]  [place] [address]  P: [phone]  F: [phone]            Patient: Erendira Pretty [id number],  :1945  Procedure date:2019  Patient's medications, allergies, past medical, surgical, social and family histories were reviewed and updated as appropriate.  Chief Complaint:  ear pressure    HPI:  Erendira is a 74 y.o. male with chronic sinusitis.      Procedure: Risks, benefits, and alternatives of the procedure were discussed with the patient, and the patient consented to the nasal endoscopy.  The nasal cavity was sprayed with a topical decongestant and anesthetic (if needed). The endoscope was passed into each nostril and each nasal cavity was visualized.  On each side the nasal cavity, sinuses (if open), turbinates, and septum were examined with the findings described below. At the end of the examination, the scope was removed. The patient tolerated the procedure well with no complications.     Endoscopic Sinonasal Exam Findings:  -     Sinuses examined: bilateral maxillary, bilateral ethmoid anterior and posterior, bilateral sphenoid and bilateral frontal            The right sinus(es) have normal mucosa            The left sinus(es) have normal mucosa  -     Nasal secretions: thin mucous bilaterally  -     Nasal septum: no significant deviation and no perforation appreciated   -     Inferior turbinate: - normal mucosa without significant hypertrophy - bilaterally  -     Middle turbinate: turbinate partially resected bilaterally ; partially lateralized on the right  -     Other findings: - no mass or obstructive lesion -    Assessment & Plan:  See today's clinic note

## 2019-12-17 ENCOUNTER — OFFICE VISIT (OUTPATIENT)
Dept: OTOLARYNGOLOGY | Facility: CLINIC | Age: 74
End: 2019-12-17
Payer: MEDICARE

## 2019-12-17 VITALS
HEIGHT: 68 IN | DIASTOLIC BLOOD PRESSURE: 81 MMHG | TEMPERATURE: 98 F | BODY MASS INDEX: 37.89 KG/M2 | HEART RATE: 69 BPM | WEIGHT: 250 LBS | SYSTOLIC BLOOD PRESSURE: 133 MMHG

## 2019-12-17 DIAGNOSIS — J44.89 ASTHMA WITH COPD: ICD-10-CM

## 2019-12-17 DIAGNOSIS — Z66 DNR (DO NOT RESUSCITATE): ICD-10-CM

## 2019-12-17 DIAGNOSIS — J32.4 CHRONIC PANSINUSITIS: ICD-10-CM

## 2019-12-17 DIAGNOSIS — H92.02 LEFT EAR PAIN: ICD-10-CM

## 2019-12-17 DIAGNOSIS — M26.622 ARTHRALGIA OF LEFT TEMPOROMANDIBULAR JOINT: ICD-10-CM

## 2019-12-17 DIAGNOSIS — H81.92 VESTIBULOPATHY OF LEFT EAR: Primary | ICD-10-CM

## 2019-12-17 PROCEDURE — 3079F PR MOST RECENT DIASTOLIC BLOOD PRESSURE 80-89 MM HG: ICD-10-PCS | Mod: CPTII,S$GLB,, | Performed by: OTOLARYNGOLOGY

## 2019-12-17 PROCEDURE — 3079F DIAST BP 80-89 MM HG: CPT | Mod: CPTII,S$GLB,, | Performed by: OTOLARYNGOLOGY

## 2019-12-17 PROCEDURE — 1159F PR MEDICATION LIST DOCUMENTED IN MEDICAL RECORD: ICD-10-PCS | Mod: S$GLB,,, | Performed by: OTOLARYNGOLOGY

## 2019-12-17 PROCEDURE — 99999 PR PBB SHADOW E&M-EST. PATIENT-LVL III: CPT | Mod: PBBFAC,,, | Performed by: OTOLARYNGOLOGY

## 2019-12-17 PROCEDURE — 3075F SYST BP GE 130 - 139MM HG: CPT | Mod: CPTII,S$GLB,, | Performed by: OTOLARYNGOLOGY

## 2019-12-17 PROCEDURE — 1101F PR PT FALLS ASSESS DOC 0-1 FALLS W/OUT INJ PAST YR: ICD-10-PCS | Mod: CPTII,S$GLB,, | Performed by: OTOLARYNGOLOGY

## 2019-12-17 PROCEDURE — 1159F MED LIST DOCD IN RCRD: CPT | Mod: S$GLB,,, | Performed by: OTOLARYNGOLOGY

## 2019-12-17 PROCEDURE — 99214 OFFICE O/P EST MOD 30 MIN: CPT | Mod: 25,S$GLB,, | Performed by: OTOLARYNGOLOGY

## 2019-12-17 PROCEDURE — 1101F PT FALLS ASSESS-DOCD LE1/YR: CPT | Mod: CPTII,S$GLB,, | Performed by: OTOLARYNGOLOGY

## 2019-12-17 PROCEDURE — 31231 NASAL ENDOSCOPY DX: CPT | Mod: S$GLB,,, | Performed by: OTOLARYNGOLOGY

## 2019-12-17 PROCEDURE — 3075F PR MOST RECENT SYSTOLIC BLOOD PRESS GE 130-139MM HG: ICD-10-PCS | Mod: CPTII,S$GLB,, | Performed by: OTOLARYNGOLOGY

## 2019-12-17 PROCEDURE — 31231 PR NASAL ENDOSCOPY, DX: ICD-10-PCS | Mod: S$GLB,,, | Performed by: OTOLARYNGOLOGY

## 2019-12-17 PROCEDURE — 1126F PR PAIN SEVERITY QUANTIFIED, NO PAIN PRESENT: ICD-10-PCS | Mod: S$GLB,,, | Performed by: OTOLARYNGOLOGY

## 2019-12-17 PROCEDURE — 1126F AMNT PAIN NOTED NONE PRSNT: CPT | Mod: S$GLB,,, | Performed by: OTOLARYNGOLOGY

## 2019-12-17 PROCEDURE — 99214 PR OFFICE/OUTPT VISIT, EST, LEVL IV, 30-39 MIN: ICD-10-PCS | Mod: 25,S$GLB,, | Performed by: OTOLARYNGOLOGY

## 2019-12-17 PROCEDURE — 99999 PR PBB SHADOW E&M-EST. PATIENT-LVL III: ICD-10-PCS | Mod: PBBFAC,,, | Performed by: OTOLARYNGOLOGY

## 2019-12-17 RX ORDER — MOMETASONE FUROATE 50 UG/1
2 SPRAY, METERED NASAL DAILY
Qty: 17 G | Refills: 12 | Status: SHIPPED | OUTPATIENT
Start: 2019-12-17 | End: 2021-08-16

## 2019-12-17 RX ORDER — MONTELUKAST SODIUM 10 MG/1
TABLET ORAL
Qty: 30 TABLET | Refills: 11 | Status: SHIPPED | OUTPATIENT
Start: 2019-12-17 | End: 2020-03-09

## 2019-12-17 RX ORDER — AMOXICILLIN AND CLAVULANATE POTASSIUM 875; 125 MG/1; MG/1
1 TABLET, FILM COATED ORAL 2 TIMES DAILY
Qty: 20 TABLET | Refills: 0 | Status: SHIPPED | OUTPATIENT
Start: 2019-12-17 | End: 2019-12-27

## 2019-12-19 PROBLEM — Z66 DNR (DO NOT RESUSCITATE): Status: ACTIVE | Noted: 2019-12-19

## 2019-12-19 NOTE — TELEPHONE ENCOUNTER
I met today with patient's wife for a palliative medicine consult, and Mr. Pretty requested that we complete a living will and POA for him as well. He prefers DNR code status and no artificial nutrition by tube. Living will and POA document completed and placed on chart today to reflect his wishes.       [unfilled]    Code Status  In light of the patients advanced and life limiting illness,I engaged the the patient in a conversation about the patient's preferences for care  at the very end of life. The patient wishes to have a natural, peaceful death.  Along those lines, the patient does not wish to have CPR or other invasive treatments performed when his heart and/or breathing stops. I communicated to the patient and family that a DNR form was completed and will be scanned into EPIC.  I spent a total of 20 minutes engaging the patient in this advance care planning discussion.     [unfilled]      [unfilled]    Power of   I initiated the process of advance care planning today and explained the importance of this process to the patient.  I introduced the concept of advance directives to the patient, as well. Then the patient received detailed information about the importance of designating a Health Care Power of  (HCPOA). He was also instructed to communicate with this person about their wishes for future healthcare, should he become sick and lose decision-making capacity. The patient has not previously appointed a HCPOA. After our discussion, the patient has decided to complete a HCPOA and has appointed his son and NAME:Henrry Pretty  I spent a total time of 10   minutes discussing this issue with the patient.       [unfilled]

## 2020-01-06 ENCOUNTER — TELEPHONE (OUTPATIENT)
Dept: RADIOLOGY | Facility: HOSPITAL | Age: 75
End: 2020-01-06

## 2020-01-06 ENCOUNTER — OFFICE VISIT (OUTPATIENT)
Dept: OPHTHALMOLOGY | Facility: CLINIC | Age: 75
End: 2020-01-06
Payer: MEDICARE

## 2020-01-06 DIAGNOSIS — H04.129 DRYNESS, EYE: Primary | ICD-10-CM

## 2020-01-06 PROCEDURE — 99999 PR PBB SHADOW E&M-EST. PATIENT-LVL II: CPT | Mod: PBBFAC,,, | Performed by: OPHTHALMOLOGY

## 2020-01-06 PROCEDURE — 92012 PR EYE EXAM, EST PATIENT,INTERMED: ICD-10-PCS | Mod: S$GLB,,, | Performed by: OPHTHALMOLOGY

## 2020-01-06 PROCEDURE — 99999 PR PBB SHADOW E&M-EST. PATIENT-LVL II: ICD-10-PCS | Mod: PBBFAC,,, | Performed by: OPHTHALMOLOGY

## 2020-01-06 PROCEDURE — 92012 INTRM OPH EXAM EST PATIENT: CPT | Mod: S$GLB,,, | Performed by: OPHTHALMOLOGY

## 2020-01-06 RX ORDER — NEOMYCIN SULFATE, POLYMYXIN B SULFATE AND DEXAMETHASONE 3.5; 10000; 1 MG/ML; [USP'U]/ML; MG/ML
1 SUSPENSION/ DROPS OPHTHALMIC 4 TIMES DAILY
Qty: 5 ML | Refills: 0 | Status: SHIPPED | OUTPATIENT
Start: 2020-01-06 | End: 2020-01-16

## 2020-01-06 NOTE — PROGRESS NOTES
===============================  Erendira Pretty,  1/6/2020 today   74 y.o. male   Last visit [facility]: :9/17/2019   Last visit eye dept. 9/17/2019  [facility]:  Uncorrected distance visual acuity was 20/20 in the right eye and 20/20 in the left eye.  Tonometry     Tonometry (Applanation, 2:31 PM)       Right Left    Pressure 17 17               Not recorded         Not recorded         Not recorded        Chief Complaint   Patient presents with    Diabetes      pt states that both of his eyes are very watery, burns and gets blurry.    Blurred Vision    Burning Eyes       ________________  1/6/2020 today  HPI     Diabetes      Additional comments:  pt states that both of his eyes are very watery,   burns and gets blurry.              Comments     + DIET CONTROLLED DM  MINI ERM OU  DRY EYES  ON RESTASIS  OU LL PLUGS          Last edited by Madeline Cash on 1/6/2020  2:18 PM. (History)      Problem List Items Addressed This Visit     Dryness, eye - Both Eyes - Primary    Relevant Medications    neomycin-polymyxin-dexamethasone (MAXITROL) 3.5mg/mL-10,000 unit/mL-0.1 % DrpS        .filamentary keratitis, viscous tear film  Dry eye  Recommend at's frequently, tid or more  maxitrol drops tid   rtc as scheduled  ===========================

## 2020-01-07 ENCOUNTER — LAB VISIT (OUTPATIENT)
Dept: LAB | Facility: HOSPITAL | Age: 75
End: 2020-01-07
Attending: FAMILY MEDICINE
Payer: MEDICARE

## 2020-01-07 DIAGNOSIS — E11.9 TYPE 2 DIABETES MELLITUS WITHOUT COMPLICATION, WITHOUT LONG-TERM CURRENT USE OF INSULIN: ICD-10-CM

## 2020-01-07 PROCEDURE — 36415 COLL VENOUS BLD VENIPUNCTURE: CPT | Mod: PO

## 2020-01-07 PROCEDURE — 80061 LIPID PANEL: CPT

## 2020-01-07 PROCEDURE — 83036 HEMOGLOBIN GLYCOSYLATED A1C: CPT

## 2020-01-07 PROCEDURE — 84443 ASSAY THYROID STIM HORMONE: CPT

## 2020-01-08 LAB
CHOLEST SERPL-MCNC: 194 MG/DL (ref 120–199)
CHOLEST/HDLC SERPL: 4.9 {RATIO} (ref 2–5)
ESTIMATED AVG GLUCOSE: 171 MG/DL (ref 68–131)
HBA1C MFR BLD HPLC: 7.6 % (ref 4–5.6)
HDLC SERPL-MCNC: 40 MG/DL (ref 40–75)
HDLC SERPL: 20.6 % (ref 20–50)
LDLC SERPL CALC-MCNC: 111.2 MG/DL (ref 63–159)
NONHDLC SERPL-MCNC: 154 MG/DL
TRIGL SERPL-MCNC: 214 MG/DL (ref 30–150)
TSH SERPL DL<=0.005 MIU/L-ACNC: 2.56 UIU/ML (ref 0.4–4)

## 2020-01-09 ENCOUNTER — HOSPITAL ENCOUNTER (OUTPATIENT)
Dept: RADIOLOGY | Facility: HOSPITAL | Age: 75
Discharge: HOME | End: 2020-01-09
Attending: PHYSICIAN ASSISTANT
Payer: MEDICARE

## 2020-01-09 DIAGNOSIS — K76.9 LIVER LESION: ICD-10-CM

## 2020-01-09 PROCEDURE — A9585 GADOBUTROL INJECTION: HCPCS | Performed by: PHYSICIAN ASSISTANT

## 2020-01-09 PROCEDURE — 25500020 PHARM REV CODE 255: Performed by: PHYSICIAN ASSISTANT

## 2020-01-09 PROCEDURE — 74183 MRI ABD W/O CNTR FLWD CNTR: CPT | Mod: TC

## 2020-01-09 RX ORDER — GADOBUTROL 604.72 MG/ML
10 INJECTION INTRAVENOUS
Status: COMPLETED | OUTPATIENT
Start: 2020-01-09 | End: 2020-01-09

## 2020-01-09 RX ADMIN — GADOBUTROL 10 ML: 604.72 INJECTION INTRAVENOUS at 11:01

## 2020-01-13 ENCOUNTER — PATIENT OUTREACH (OUTPATIENT)
Dept: PULMONOLOGY | Facility: CLINIC | Age: 75
End: 2020-01-13

## 2020-01-13 NOTE — TELEPHONE ENCOUNTER
Chronic Disease Management  Called patient to complete Pulmonary Disease Management Questionnaire.    Patient confirmed appointment with PDM on 1/16/20 beginning at 1030 at [facility]. Advised patient to bring respiratory inhalers to visit.   Time  spent with patient: 3 Minutes

## 2020-01-15 ENCOUNTER — TELEPHONE (OUTPATIENT)
Dept: PULMONOLOGY | Facility: CLINIC | Age: 75
End: 2020-01-15

## 2020-01-15 NOTE — TELEPHONE ENCOUNTER
Chronic Disease Management:     Called patient to confirm pulmonary disease management appointment for 1/16/20 beginning at 1030 at [facility]. Patient confirmed. Advised patient to bring respiratory inhalers to visit.

## 2020-01-16 ENCOUNTER — PROCEDURE VISIT (OUTPATIENT)
Dept: PULMONOLOGY | Facility: CLINIC | Age: 75
End: 2020-01-16
Payer: MEDICARE

## 2020-01-16 ENCOUNTER — CLINICAL SUPPORT (OUTPATIENT)
Dept: CARDIOLOGY | Facility: CLINIC | Age: 75
End: 2020-01-16
Attending: FAMILY MEDICINE
Payer: MEDICARE

## 2020-01-16 VITALS
HEART RATE: 77 BPM | OXYGEN SATURATION: 99 % | RESPIRATION RATE: 16 BRPM | BODY MASS INDEX: 36.75 KG/M2 | WEIGHT: 242.5 LBS | HEIGHT: 68 IN

## 2020-01-16 DIAGNOSIS — I77.89 OTHER SPECIFIED DISORDERS OF ARTERIES AND ARTERIOLES: ICD-10-CM

## 2020-01-16 DIAGNOSIS — J45.909 ASTHMA: Primary | ICD-10-CM

## 2020-01-16 PROCEDURE — 93880 CAR US DOPPLER CAROTID BILATERAL: ICD-10-PCS | Mod: S$GLB,,, | Performed by: INTERNAL MEDICINE

## 2020-01-16 PROCEDURE — 93880 EXTRACRANIAL BILAT STUDY: CPT | Mod: S$GLB,,, | Performed by: INTERNAL MEDICINE

## 2020-01-16 NOTE — PROGRESS NOTES
Pulmonary Disease Management  [facility]  Follow up Visit  Chronic Care Management    Erendira Pretty  was seen 1/16/2020  11:30 AM in the Pulmonary Disease management clinic for evaluation, educate and reinforce self management techniques and exacerbation action plan.    Ryan Clement    Past Medical History:   Diagnosis Date    Aortic stenosis     Asthma     BPH (benign prostatic hyperplasia)     CAD (coronary artery disease)     40% lesion 2002;lazo    Cirrhosis     Claudication 4/9/2014    Colon polyp     COPD (chronic obstructive pulmonary disease)     ED (erectile dysfunction)     Encounter for blood transfusion     Ex-smoker     Hearing loss NEC     Hepatitis C     Cured;yuly brownoni 2015    Hyperlipidemia     Hypertension     Hypothyroid     s/p tx graves    DELONTE on CPAP     Osteoarthritis     PVD (peripheral vascular disease)     Type 2 diabetes mellitus        Patient's Medications   New Prescriptions    No medications on file   Previous Medications    ACCU-CHEK GRACE PLUS METER MISC    AS DIRECTED    ACCU-CHEK GRACE PLUS TEST STRP STRP    TEST THREE TIMES A DAY    ALBUTEROL-IPRATROPIUM (DUO-NEB) 2.5 MG-0.5 MG/3 ML NEBULIZER SOLUTION    Take 3 mLs by nebulization 2 (two) times daily. Rescue    AZELASTINE (ASTELIN) 137 MCG (0.1 %) NASAL SPRAY    2 sprays (274 mcg total) by Nasal route 2 (two) times daily.    BUDESONIDE-FORMOTEROL 160-4.5 MCG (SYMBICORT) 160-4.5 MCG/ACTUATION HFAA    INHALE 2 PUFFS INTO THE LUNGS TWO TIMES A DAY    DICLOFENAC SODIUM (VOLTAREN) 1 % GEL    Apply 2 g topically 4 (four) times daily.    DOCUSATE SODIUM (COLACE) 100 MG CAPSULE    Take 1 capsule (100 mg total) by mouth 2 (two) times daily.    FINASTERIDE (PROSCAR) 5 MG TABLET    Take 1 tablet (5 mg total) by mouth once daily.    FLUAD 4749-6224, 65 YR UP,,PF, 45 MCG (15 MCG X 3)/0.5 ML SYRG        FUROSEMIDE (LASIX) 40 MG TABLET    Take 1 tablet (40 mg) by mouth 2 times a day and 1 extra if  "needed for 14 days.    GLUCOSAMINE HCL/CHONDR ROSARIO A NA (OSTEO BI-FLEX ORAL)    Take 1 tablet by mouth 2 (two) times daily.     HUMALOG KWIKPEN INSULIN 100 UNIT/ML PEN    INJECT 3-4 UNITS INTO THE SKIN THREE TIMES DAILY BEFORE MEALS.    HYDROCODONE-ACETAMINOPHEN (NORCO)  MG PER TABLET    Take 1 tablet by mouth every 4 (four) hours as needed for Pain.    INSULIN GLARGINE (LANTUS SOLOSTAR) 100 UNIT/ML (3 ML) INPN PEN    Inject 40 Units into the skin once daily.    KRILL  MG CAP    Take by mouth.    LANCETS (ACCU-CHEK FASTCLIX LANCET DRUM) MISC    TEST TWO TIMES A DAY    LEVOCETIRIZINE (XYZAL) 5 MG TABLET    TAKE ONE TABLET BY MOUTH EVERY EVENING    METOPROLOL SUCCINATE (TOPROL-XL) 50 MG 24 HR TABLET    Take 1 tablet (50 mg total) by mouth 2 (two) times daily.    MILK THISTLE 175 MG TABLET    Take 175 mg by mouth once daily.    MOMETASONE (NASONEX) 50 MCG/ACTUATION NASAL SPRAY    2 sprays by Nasal route once daily.    MONTELUKAST (SINGULAIR) 10 MG TABLET    TAKE ONE TABLET BY MOUTH EVERY DAY    MV,CA,IRON,MIN/FA/PHYTOSTEROL (CENTRUM [facility] HEART ORAL)    Take 1 tablet by mouth once daily.     NEOMYCIN-POLYMYXIN-DEXAMETHASONE (MAXITROL) 3.5MG/ML-10,000 UNIT/ML-0.1 % DRPS    Place 1 drop into both eyes 4 (four) times daily. Ou qid and apply topically to lid margins also QID for 10 days    PEN NEEDLE, DIABETIC (BD INSULIN PEN NEEDLE UF MINI) 31 GAUGE X 3/16" NDLE    USE AS DIRECTED    POLYETHYLENE GLYCOL (GLYCOLAX) 17 GRAM/DOSE POWDER    Take 17 g ( 1 capful) by mouth once daily.    PREDNISONE (DELTASONE) 20 MG TABLET    Take 1 tablet (20 mg total) by mouth As instructed. 3tab po daily x3days,2tabs po daily x3days,1tab po daily x3days,1/2tab po daily x3days    RABEPRAZOLE (ACIPHEX) 20 MG TABLET    Take 20 mg by mouth once daily.    SILDENAFIL (REVATIO) 20 MG TAB    Take 2 tables by mouth one hour prior to intercourse.  Max 2 per 24 hours    SILDENAFIL (REVATIO) 20 MG TAB    Take 2 tablets one hour prior to " intercourse. Max two per 24 hours    SYNTHROID 137 MCG TAB TABLET    TAKE 2 TABLETS BY MOUTH BEFORE BREAKFAST.   Modified Medications    No medications on file   Discontinued Medications    No medications on file             Educational assessment:   [x]            Good  []            Fair  []            Poor    Readiness to learn:   [x]            Good  []            Fair  []            Poor    Vision Status:   [x]            Good  []            Fair  []            Poor    Reading Ability:  [x]            Good  []            Fair  []            Poor    Knowledge of condition:   [x]            Good  []            Fair  []            Poor    Language Barriers:   []            Good  []            Fair  []            Poor  [x]            None    Cognitive/ Physical Barriers:   []            Good  []            Fair  []            Poor  [x]            None    Learning best by:                       [x]            Seeing  []            Hearing  []            Reading                         [x]            Doing    Describe any barrier /Limitation or financial implications of care choices identified     []            Financial  []            Emotional  []            Education  []            Vision/Hearing  []            Physical  [x]            None  []                TOPIC /CONTENT FOR TODAY:    [x]            MDI with or without spacer  []            Dry power inhaler  []            Accapella   []           Peak Flow meter  [x]            Asthma action plan  [x]            Nebulizer use  []            Oxygen use safety  [x]            Breathing and cough techniques  []            Energy coservation  [x]            Infection prevention      Learner:    [x]            Patient   []            Caregiver    Method:    [x]            Verbal explanation  [x]            Audio visual    [x]            Literature  [x]            Teach back      Evaluation:    [x]            Teach back  [x]            Demonsatrate  [x]             Follow up phone call    []            2 weeks     [x]            4 weeks   []            PRN    Additional comments:     Patient was seen today to review respiratory medication purpose and proper technique for use of inhalers. Patient practiced proper technique using MDI with valved holding chamber (spacer). Provided patient with new spacer. Patient demonstrated understanding. Literature was given to patient.    Patient is compliant with CPAP use as well as Symbicort maintenance medication therapy. Patient reports taking one puff of Symbicort, twice daily. Patient rarely has to utilize rescue medication. Reviewed prescribed frequency for use of Symbicort. Reminded patient to rinse after use. Patient verbalized understanding.      Asthma trigger checklist was verbally reviewed and literature given to patient.    Reviewed pursed-lip breathing and murray-cough techniques with patient. Patient demonstrated understanding. Literature given to patient.      Infection prevention was discussed. Patient is up to date on vaccinations. Hand hygiene and cleaning of respiratory equipment was also discussed. Patient verbalized understanding.      Asthma action plan was reviewed and literature was given to patient. Patient verbalized understanding.     Plan:  Follow up appointment scheduled for 7/21/20 at 10:00 AM at [facility].   Monthly pulmonary disease management questionnaire  Reinforce education  Meds: LABA, ICS, NEDA   DME Needs: [facility]   Action Plan: Asthma  Immunization: Pneumococcal- current, Flu-current  Next Provider Visit: 4/3/20  Next Spirometry/CPFT: 4/3/20  Approximate time spent with patient: 30 minutes

## 2020-01-17 LAB — INTERNAL CAROTID STENOSIS: ABNORMAL

## 2020-01-22 RX ORDER — INSULIN LISPRO 100 [IU]/ML
INJECTION, SOLUTION INTRAVENOUS; SUBCUTANEOUS
Qty: 15 ML | Refills: 11 | Status: SHIPPED | OUTPATIENT
Start: 2020-01-22 | End: 2021-09-29

## 2020-01-24 ENCOUNTER — TELEPHONE (OUTPATIENT)
Dept: GASTROENTEROLOGY | Facility: CLINIC | Age: 75
End: 2020-01-24

## 2020-01-24 ENCOUNTER — TELEPHONE (OUTPATIENT)
Dept: INTERNAL MEDICINE | Facility: CLINIC | Age: 75
End: 2020-01-24

## 2020-01-24 NOTE — TELEPHONE ENCOUNTER
----- Message from Shelli Rodriguez sent at 1/24/2020  2:13 PM CST -----  .Type:  Test Results    Who Called: self  Name of Test (Lab/Mammo/Etc): mri  Date of Test: 1/9  Ordering Provider: bernie  Where the test was performed: [facility]  Would the patient rather a call back or a response via [facility]? call  Best Call Back Number: [phone]  Additional Information:

## 2020-01-24 NOTE — TELEPHONE ENCOUNTER
----- Message from Rachele Mcgill sent at 1/24/2020  1:54 PM CST -----  Contact: Patient  Patient would like a call back concerning a letter he received about  his test results. Please call to advise at Ph. [phone]

## 2020-01-24 NOTE — TELEPHONE ENCOUNTER
Attempted to contact patient at [phone] with no answer. Left voicemail message for patient to return call.

## 2020-01-24 NOTE — TELEPHONE ENCOUNTER
Patient called in to get MRI results. I assured patient that I would get in touch with Dr. Tomas once she came back into the office and would make sure she took a look. Her verbalized understanding.

## 2020-01-26 DIAGNOSIS — I77.89 OTHER SPECIFIED DISORDERS OF ARTERIES AND ARTERIOLES: Primary | ICD-10-CM

## 2020-01-26 RX ORDER — METOPROLOL SUCCINATE 50 MG/1
TABLET, EXTENDED RELEASE ORAL
Qty: 60 TABLET | Refills: 11 | Status: SHIPPED | OUTPATIENT
Start: 2020-01-26 | End: 2020-01-31

## 2020-01-27 NOTE — TELEPHONE ENCOUNTER
Sugar control not as good.a1c bitover 7; Will discuss more at March visit  Also can send in home sugar readings before each meal for a week and adjust insulin from that

## 2020-01-28 ENCOUNTER — TELEPHONE (OUTPATIENT)
Dept: INTERNAL MEDICINE | Facility: CLINIC | Age: 75
End: 2020-01-28

## 2020-01-28 ENCOUNTER — TELEPHONE (OUTPATIENT)
Dept: GASTROENTEROLOGY | Facility: CLINIC | Age: 75
End: 2020-01-28

## 2020-01-28 NOTE — TELEPHONE ENCOUNTER
----- Message from Eddie Gamboa sent at 1/28/2020  7:49 AM CST -----  Contact: Self- [phone]  Pt would like a call from nurse in regard to lab results. Please call back at [phone].     Thank You,   Eddie Gamboa

## 2020-01-28 NOTE — TELEPHONE ENCOUNTER
----- Message from Vandana Clemens sent at 1/28/2020  2:41 PM CST -----  Contact: Pt   Type:  Test Results    Who Called: .Erendira Pretty  Name of Test (Lab/Mammo/Etc): Labs  Date of Test: 01/07/2020  Ordering Provider:   Where the test was performed: [facility]   Would the patient rather a call back or a response via [facility]? Call back  Best Call Back Number: [phone]  Additional Information:

## 2020-01-28 NOTE — TELEPHONE ENCOUNTER
Spoke with pt, informed pt lab results an details per Dr. Roblero advice pt verbalized understanding.

## 2020-01-28 NOTE — TELEPHONE ENCOUNTER
Attempted to return call to [phone] with no answer. Left voicemail message for patient to return call.

## 2020-01-29 ENCOUNTER — TELEPHONE (OUTPATIENT)
Dept: GASTROENTEROLOGY | Facility: CLINIC | Age: 75
End: 2020-01-29

## 2020-01-29 NOTE — TELEPHONE ENCOUNTER
Imaging was reviewed from December.  I do not order the scans so   It did not come to my box.  Appears that there is no evidence of recurrence of the cancer.

## 2020-01-29 NOTE — TELEPHONE ENCOUNTER
Spoke with patient and gave results of MRI. Patient verbalized understanding and no questions or concerns. Patient confirmed follow-up appointment with Dr. Tomas on 03/11/2020.

## 2020-01-29 NOTE — TELEPHONE ENCOUNTER
----- Message from Jil Spencer sent at 1/29/2020 11:45 AM CST -----  Contact: pt   Type:  Patient Returning Call    Who Called: pt   Who Left Message for Patient: Anna Tomas office   Does the patient know what this is regarding?:   Would the patient rather a call back or a response via [facility]? Call back  Best Call Back Number:[phone]  Additional Information:

## 2020-01-31 RX ORDER — METOPROLOL SUCCINATE 50 MG/1
TABLET, EXTENDED RELEASE ORAL
Qty: 60 TABLET | Refills: 11 | Status: SHIPPED | OUTPATIENT
Start: 2020-01-31 | End: 2021-02-01

## 2020-02-05 ENCOUNTER — TELEPHONE (OUTPATIENT)
Dept: INTERNAL MEDICINE | Facility: CLINIC | Age: 75
End: 2020-02-05

## 2020-02-05 NOTE — TELEPHONE ENCOUNTER
----- Message from Vandana Clemens sent at 2/5/2020  2:49 PM CST -----  Contact: Pt   Type:  Patient Returning Call    Who Called:Erendira Pretty  Who Left Message for Patient:JULY BROOKS  Does the patient know what this is regarding?:  Would the patient rather a call back or a response via [facility]? Call back  Best Call Back Number:[phone] (mobile) [phone] (home)   Additional Information:

## 2020-02-05 NOTE — TELEPHONE ENCOUNTER
----- Message from Vannessa May sent at 2/5/2020 10:14 AM CST -----  Contact: pt  Type:  Patient Returning Call    Who Called: the pt   Who Left Message for Patient: unknown  Does the patient know what this is regarding?: no  Would the patient rather a call back or a response via [facility]? call back  Best Call Back Number: [phone]  Additional Information: n/a

## 2020-02-11 RX ORDER — INSULIN GLARGINE 100 [IU]/ML
40 INJECTION, SOLUTION SUBCUTANEOUS DAILY
Qty: 5 SYRINGE | Refills: 6 | Status: CANCELLED | OUTPATIENT
Start: 2020-02-11

## 2020-02-11 NOTE — TELEPHONE ENCOUNTER
I tried to reach pharmacy received no answer but, I did send the request to  for refill as a high priority. //kah

## 2020-02-11 NOTE — TELEPHONE ENCOUNTER
----- Message from Jil Spencer sent at 2/11/2020  2:26 PM CST -----  Contact: [facility] - [place]  State they have sent over a request for pt refill on (insulin glargine (LANTUS SOLOSTAR) 100 unit/mL (3 mL) InPn pen) over a week ago with no response, she can be reached at [phone] Thanks       [facility] - [place], [place] - [address] [facility] [address]  [address] [address] [address]  Phone: [phone] Fax: [phone]

## 2020-02-13 RX ORDER — INSULIN GLARGINE 100 [IU]/ML
40 INJECTION, SOLUTION SUBCUTANEOUS DAILY
Qty: 5 SYRINGE | Refills: 6 | Status: SHIPPED | OUTPATIENT
Start: 2020-02-13 | End: 2020-03-09

## 2020-02-13 NOTE — TELEPHONE ENCOUNTER
I called and informed the pt that his lantus has been refilled by Crystal Duarte NP. He verbalized understanding. //kah

## 2020-02-17 NOTE — TELEPHONE ENCOUNTER
----- Message from Michelle Luna sent at 2/17/2020 12:05 PM CST -----  Contact: self  Type:  RX Refill Request    Who Called: pt  Refill or New Rx:refill  RX Name and Strength: glucose meter  How is the patient currently taking it? (ex. 1XDay):n/a  Is this a 30 day or 90 day RX:n/a  Preferred Pharmacy with phone number:  [place], [place] - [address] [facility] [address]  [address] [address] [address]  Phone: [phone] Fax: [phone]  Local or Mail Order:local  Ordering Provider:alexandro  Would the patient rather a call back or a response via [facility]? Call back  Best Call Back Number:[phone]  Additional Information: none    Thanks,  Michelle Luna

## 2020-02-18 NOTE — TELEPHONE ENCOUNTER
----- Message from Shelli Rodriguez sent at 2/18/2020 10:39 AM CST -----  Contact: yanni wasserman test strips verbal order needed...[phone]

## 2020-02-19 ENCOUNTER — TELEPHONE (OUTPATIENT)
Dept: UROLOGY | Facility: CLINIC | Age: 75
End: 2020-02-19

## 2020-02-19 NOTE — TELEPHONE ENCOUNTER
Returned call to pt; requested sooner appt but nothing available til end of March or first of April; pt declined and stated he would seek care elsewhere.

## 2020-02-20 ENCOUNTER — PATIENT OUTREACH (OUTPATIENT)
Dept: PULMONOLOGY | Facility: CLINIC | Age: 75
End: 2020-02-20

## 2020-03-01 ENCOUNTER — HOSPITAL ENCOUNTER (EMERGENCY)
Facility: HOSPITAL | Age: 75
Discharge: HOME | End: 2020-03-01
Attending: EMERGENCY MEDICINE
Payer: MEDICARE

## 2020-03-01 ENCOUNTER — HOSPITAL ENCOUNTER (INPATIENT)
Facility: HOSPITAL | Age: 75
LOS: 2 days | Discharge: HOME | DRG: 287 | End: 2020-03-03
Attending: EMERGENCY MEDICINE | Admitting: INTERNAL MEDICINE
Payer: MEDICARE

## 2020-03-01 VITALS
RESPIRATION RATE: 30 BRPM | TEMPERATURE: 99 F | HEART RATE: 70 BPM | SYSTOLIC BLOOD PRESSURE: 158 MMHG | OXYGEN SATURATION: 98 % | DIASTOLIC BLOOD PRESSURE: 100 MMHG | WEIGHT: 251.31 LBS | BODY MASS INDEX: 38.21 KG/M2

## 2020-03-01 DIAGNOSIS — I25.119 CHEST PAIN DUE TO CAD: ICD-10-CM

## 2020-03-01 DIAGNOSIS — I20.0 UNSTABLE ANGINA: ICD-10-CM

## 2020-03-01 DIAGNOSIS — R07.9 CHEST PAIN WITH HIGH RISK OF ACUTE CORONARY SYNDROME: Primary | ICD-10-CM

## 2020-03-01 DIAGNOSIS — R07.9 CHEST PAIN: ICD-10-CM

## 2020-03-01 DIAGNOSIS — I10 HYPERTENSION, UNSPECIFIED TYPE: Primary | ICD-10-CM

## 2020-03-01 DIAGNOSIS — I25.10 CORONARY ARTERY DISEASE INVOLVING NATIVE CORONARY ARTERY OF NATIVE HEART WITHOUT ANGINA PECTORIS: Chronic | ICD-10-CM

## 2020-03-01 DIAGNOSIS — I48.0 PAROXYSMAL ATRIAL FIBRILLATION: ICD-10-CM

## 2020-03-01 LAB
ALBUMIN SERPL BCP-MCNC: 3.9 G/DL (ref 3.5–5.2)
ALBUMIN SERPL BCP-MCNC: 4 G/DL (ref 3.5–5.2)
ALP SERPL-CCNC: 70 U/L (ref 55–135)
ALP SERPL-CCNC: 85 U/L (ref 55–135)
ALT SERPL W/O P-5'-P-CCNC: 32 U/L (ref 10–44)
ALT SERPL W/O P-5'-P-CCNC: 33 U/L (ref 10–44)
ANION GAP SERPL CALC-SCNC: 10 MMOL/L (ref 8–16)
ANION GAP SERPL CALC-SCNC: 10 MMOL/L (ref 8–16)
AST SERPL-CCNC: 26 U/L (ref 10–40)
AST SERPL-CCNC: 28 U/L (ref 10–40)
BASOPHILS # BLD AUTO: 0.03 K/UL (ref 0–0.2)
BASOPHILS # BLD AUTO: 0.05 K/UL (ref 0–0.2)
BASOPHILS NFR BLD: 0.6 % (ref 0–1.9)
BASOPHILS NFR BLD: 0.8 % (ref 0–1.9)
BILIRUB SERPL-MCNC: 0.4 MG/DL (ref 0.1–1)
BILIRUB SERPL-MCNC: 0.4 MG/DL (ref 0.1–1)
BILIRUB UR QL STRIP: NEGATIVE
BNP SERPL-MCNC: 143 PG/ML (ref 0–99)
BUN SERPL-MCNC: 14 MG/DL (ref 8–23)
BUN SERPL-MCNC: 15 MG/DL (ref 8–23)
CALCIUM SERPL-MCNC: 9.3 MG/DL (ref 8.7–10.5)
CALCIUM SERPL-MCNC: 9.3 MG/DL (ref 8.7–10.5)
CHLORIDE SERPL-SCNC: 101 MMOL/L (ref 95–110)
CHLORIDE SERPL-SCNC: 102 MMOL/L (ref 95–110)
CLARITY UR: CLEAR
CO2 SERPL-SCNC: 28 MMOL/L (ref 23–29)
CO2 SERPL-SCNC: 28 MMOL/L (ref 23–29)
COLOR UR: YELLOW
CREAT SERPL-MCNC: 1.1 MG/DL (ref 0.5–1.4)
CREAT SERPL-MCNC: 1.2 MG/DL (ref 0.5–1.4)
DIFFERENTIAL METHOD: ABNORMAL
DIFFERENTIAL METHOD: ABNORMAL
EOSINOPHIL # BLD AUTO: 0.1 K/UL (ref 0–0.5)
EOSINOPHIL # BLD AUTO: 0.1 K/UL (ref 0–0.5)
EOSINOPHIL NFR BLD: 2 % (ref 0–8)
EOSINOPHIL NFR BLD: 2.9 % (ref 0–8)
ERYTHROCYTE [DISTWIDTH] IN BLOOD BY AUTOMATED COUNT: 12.4 % (ref 11.5–14.5)
ERYTHROCYTE [DISTWIDTH] IN BLOOD BY AUTOMATED COUNT: 12.5 % (ref 11.5–14.5)
EST. GFR  (AFRICAN AMERICAN): >60 ML/MIN/1.73 M^2
EST. GFR  (AFRICAN AMERICAN): >60 ML/MIN/1.73 M^2
EST. GFR  (NON AFRICAN AMERICAN): 60 ML/MIN/1.73 M^2
EST. GFR  (NON AFRICAN AMERICAN): >60 ML/MIN/1.73 M^2
GLUCOSE SERPL-MCNC: 150 MG/DL (ref 70–110)
GLUCOSE SERPL-MCNC: 177 MG/DL (ref 70–110)
GLUCOSE UR QL STRIP: NEGATIVE
HCT VFR BLD AUTO: 41.5 % (ref 40–54)
HCT VFR BLD AUTO: 44.1 % (ref 40–54)
HGB BLD-MCNC: 14.5 G/DL (ref 14–18)
HGB BLD-MCNC: 15.2 G/DL (ref 14–18)
HGB UR QL STRIP: NEGATIVE
IMM GRANULOCYTES # BLD AUTO: 0.01 K/UL (ref 0–0.04)
IMM GRANULOCYTES # BLD AUTO: 0.02 K/UL (ref 0–0.04)
IMM GRANULOCYTES NFR BLD AUTO: 0.2 % (ref 0–0.5)
IMM GRANULOCYTES NFR BLD AUTO: 0.4 % (ref 0–0.5)
INR PPP: 1 (ref 0.8–1.2)
KETONES UR QL STRIP: NEGATIVE
LEUKOCYTE ESTERASE UR QL STRIP: NEGATIVE
LYMPHOCYTES # BLD AUTO: 1.4 K/UL (ref 1–4.8)
LYMPHOCYTES # BLD AUTO: 2 K/UL (ref 1–4.8)
LYMPHOCYTES NFR BLD: 29.9 % (ref 18–48)
LYMPHOCYTES NFR BLD: 31 % (ref 18–48)
MCH RBC QN AUTO: 32.3 PG (ref 27–31)
MCH RBC QN AUTO: 32.5 PG (ref 27–31)
MCHC RBC AUTO-ENTMCNC: 34.5 G/DL (ref 32–36)
MCHC RBC AUTO-ENTMCNC: 34.9 G/DL (ref 32–36)
MCV RBC AUTO: 93 FL (ref 82–98)
MCV RBC AUTO: 94 FL (ref 82–98)
MONOCYTES # BLD AUTO: 0.5 K/UL (ref 0.3–1)
MONOCYTES # BLD AUTO: 0.7 K/UL (ref 0.3–1)
MONOCYTES NFR BLD: 11.3 % (ref 4–15)
MONOCYTES NFR BLD: 9.6 % (ref 4–15)
NEUTROPHILS # BLD AUTO: 2.7 K/UL (ref 1.8–7.7)
NEUTROPHILS # BLD AUTO: 3.5 K/UL (ref 1.8–7.7)
NEUTROPHILS NFR BLD: 54.7 % (ref 38–73)
NEUTROPHILS NFR BLD: 56.6 % (ref 38–73)
NITRITE UR QL STRIP: NEGATIVE
NRBC BLD-RTO: 0 /100 WBC
NRBC BLD-RTO: 0 /100 WBC
PH UR STRIP: 7 [PH] (ref 5–8)
PLATELET # BLD AUTO: 135 K/UL (ref 150–350)
PLATELET # BLD AUTO: 145 K/UL (ref 150–350)
PMV BLD AUTO: 10 FL (ref 9.2–12.9)
PMV BLD AUTO: 10 FL (ref 9.2–12.9)
POCT GLUCOSE: 163 MG/DL (ref 70–110)
POTASSIUM SERPL-SCNC: 3.7 MMOL/L (ref 3.5–5.1)
POTASSIUM SERPL-SCNC: 3.9 MMOL/L (ref 3.5–5.1)
PROT SERPL-MCNC: 7.8 G/DL (ref 6–8.4)
PROT SERPL-MCNC: 8.1 G/DL (ref 6–8.4)
PROT UR QL STRIP: NEGATIVE
PROTHROMBIN TIME: 10.4 SEC (ref 9–12.5)
RBC # BLD AUTO: 4.46 M/UL (ref 4.6–6.2)
RBC # BLD AUTO: 4.71 M/UL (ref 4.6–6.2)
SODIUM SERPL-SCNC: 139 MMOL/L (ref 136–145)
SODIUM SERPL-SCNC: 140 MMOL/L (ref 136–145)
SP GR UR STRIP: 1.01 (ref 1–1.03)
TROPONIN I SERPL DL<=0.01 NG/ML-MCNC: 0.02 NG/ML (ref 0–0.03)
URN SPEC COLLECT METH UR: NORMAL
UROBILINOGEN UR STRIP-ACNC: NEGATIVE EU/DL
WBC # BLD AUTO: 4.78 K/UL (ref 3.9–12.7)
WBC # BLD AUTO: 6.36 K/UL (ref 3.9–12.7)

## 2020-03-01 PROCEDURE — 99285 EMERGENCY DEPT VISIT HI MDM: CPT | Mod: 25,27

## 2020-03-01 PROCEDURE — 36000 PLACE NEEDLE IN VEIN: CPT

## 2020-03-01 PROCEDURE — 93010 ELECTROCARDIOGRAM REPORT: CPT | Mod: 76,,, | Performed by: INTERNAL MEDICINE

## 2020-03-01 PROCEDURE — 85610 PROTHROMBIN TIME: CPT

## 2020-03-01 PROCEDURE — 93010 ELECTROCARDIOGRAM REPORT: CPT | Mod: ,,, | Performed by: INTERNAL MEDICINE

## 2020-03-01 PROCEDURE — 85025 COMPLETE CBC W/AUTO DIFF WBC: CPT | Mod: 91

## 2020-03-01 PROCEDURE — 81003 URINALYSIS AUTO W/O SCOPE: CPT

## 2020-03-01 PROCEDURE — 93005 ELECTROCARDIOGRAM TRACING: CPT

## 2020-03-01 PROCEDURE — G0378 HOSPITAL OBSERVATION PER HR: HCPCS

## 2020-03-01 PROCEDURE — 99285 EMERGENCY DEPT VISIT HI MDM: CPT | Mod: 25

## 2020-03-01 PROCEDURE — 80053 COMPREHEN METABOLIC PANEL: CPT

## 2020-03-01 PROCEDURE — 85025 COMPLETE CBC W/AUTO DIFF WBC: CPT

## 2020-03-01 PROCEDURE — 84484 ASSAY OF TROPONIN QUANT: CPT

## 2020-03-01 PROCEDURE — 21400001 HC TELEMETRY ROOM

## 2020-03-01 PROCEDURE — 96375 TX/PRO/DX INJ NEW DRUG ADDON: CPT

## 2020-03-01 PROCEDURE — 93010 EKG 12-LEAD: ICD-10-PCS | Mod: ,,, | Performed by: INTERNAL MEDICINE

## 2020-03-01 PROCEDURE — 25000003 PHARM REV CODE 250: Performed by: EMERGENCY MEDICINE

## 2020-03-01 PROCEDURE — 96374 THER/PROPH/DIAG INJ IV PUSH: CPT

## 2020-03-01 PROCEDURE — 96372 THER/PROPH/DIAG INJ SC/IM: CPT

## 2020-03-01 PROCEDURE — 84484 ASSAY OF TROPONIN QUANT: CPT | Mod: 91

## 2020-03-01 PROCEDURE — 25000003 PHARM REV CODE 250: Performed by: NURSE PRACTITIONER

## 2020-03-01 PROCEDURE — 80053 COMPREHEN METABOLIC PANEL: CPT | Mod: 91

## 2020-03-01 PROCEDURE — 93010 EKG 12-LEAD: ICD-10-PCS | Mod: 76,,, | Performed by: INTERNAL MEDICINE

## 2020-03-01 PROCEDURE — 63600175 PHARM REV CODE 636 W HCPCS: Performed by: NURSE PRACTITIONER

## 2020-03-01 PROCEDURE — 83880 ASSAY OF NATRIURETIC PEPTIDE: CPT

## 2020-03-01 RX ORDER — NITROGLYCERIN 0.4 MG/1
0.4 TABLET SUBLINGUAL EVERY 5 MIN PRN
Status: DISCONTINUED | OUTPATIENT
Start: 2020-03-01 | End: 2020-03-03 | Stop reason: HOSPADM

## 2020-03-01 RX ORDER — ENOXAPARIN SODIUM 100 MG/ML
40 INJECTION SUBCUTANEOUS EVERY 24 HOURS
Status: DISCONTINUED | OUTPATIENT
Start: 2020-03-01 | End: 2020-03-02

## 2020-03-01 RX ORDER — ACETAMINOPHEN 325 MG/1
650 TABLET ORAL EVERY 6 HOURS PRN
Status: DISCONTINUED | OUTPATIENT
Start: 2020-03-01 | End: 2020-03-03 | Stop reason: HOSPADM

## 2020-03-01 RX ORDER — IBUPROFEN 200 MG
24 TABLET ORAL
Status: DISCONTINUED | OUTPATIENT
Start: 2020-03-01 | End: 2020-03-02

## 2020-03-01 RX ORDER — LABETALOL HYDROCHLORIDE 5 MG/ML
20 INJECTION, SOLUTION INTRAVENOUS
Status: COMPLETED | OUTPATIENT
Start: 2020-03-01 | End: 2020-03-01

## 2020-03-01 RX ORDER — LISINOPRIL 10 MG/1
10 TABLET ORAL DAILY
Status: DISCONTINUED | OUTPATIENT
Start: 2020-03-02 | End: 2020-03-03 | Stop reason: HOSPADM

## 2020-03-01 RX ORDER — LISINOPRIL 10 MG/1
10 TABLET ORAL DAILY
Qty: 30 TABLET | Refills: 6 | Status: SHIPPED | OUTPATIENT
Start: 2020-03-01 | End: 2020-10-06 | Stop reason: SDUPTHER

## 2020-03-01 RX ORDER — INSULIN ASPART 100 [IU]/ML
0-5 INJECTION, SOLUTION INTRAVENOUS; SUBCUTANEOUS
Status: DISCONTINUED | OUTPATIENT
Start: 2020-03-01 | End: 2020-03-02

## 2020-03-01 RX ORDER — FUROSEMIDE 40 MG/1
40 TABLET ORAL 2 TIMES DAILY
Status: DISCONTINUED | OUTPATIENT
Start: 2020-03-02 | End: 2020-03-02

## 2020-03-01 RX ORDER — GLUCAGON 1 MG
1 KIT INJECTION
Status: DISCONTINUED | OUTPATIENT
Start: 2020-03-01 | End: 2020-03-02

## 2020-03-01 RX ORDER — MONTELUKAST SODIUM 10 MG/1
10 TABLET ORAL DAILY
Status: DISCONTINUED | OUTPATIENT
Start: 2020-03-02 | End: 2020-03-03 | Stop reason: HOSPADM

## 2020-03-01 RX ORDER — IBUPROFEN 200 MG
16 TABLET ORAL
Status: DISCONTINUED | OUTPATIENT
Start: 2020-03-01 | End: 2020-03-02

## 2020-03-01 RX ORDER — NAPROXEN SODIUM 220 MG/1
324 TABLET, FILM COATED ORAL
Status: COMPLETED | OUTPATIENT
Start: 2020-03-01 | End: 2020-03-01

## 2020-03-01 RX ORDER — IPRATROPIUM BROMIDE AND ALBUTEROL SULFATE 2.5; .5 MG/3ML; MG/3ML
3 SOLUTION RESPIRATORY (INHALATION) EVERY 4 HOURS PRN
Status: DISCONTINUED | OUTPATIENT
Start: 2020-03-01 | End: 2020-03-03 | Stop reason: HOSPADM

## 2020-03-01 RX ORDER — PANTOPRAZOLE SODIUM 40 MG/1
40 TABLET, DELAYED RELEASE ORAL DAILY
Status: DISCONTINUED | OUTPATIENT
Start: 2020-03-02 | End: 2020-03-03 | Stop reason: HOSPADM

## 2020-03-01 RX ORDER — METOPROLOL SUCCINATE 50 MG/1
50 TABLET, EXTENDED RELEASE ORAL 2 TIMES DAILY
Status: DISCONTINUED | OUTPATIENT
Start: 2020-03-01 | End: 2020-03-03 | Stop reason: HOSPADM

## 2020-03-01 RX ORDER — ONDANSETRON 2 MG/ML
4 INJECTION INTRAMUSCULAR; INTRAVENOUS EVERY 6 HOURS PRN
Status: DISCONTINUED | OUTPATIENT
Start: 2020-03-01 | End: 2020-03-03 | Stop reason: HOSPADM

## 2020-03-01 RX ORDER — BUDESONIDE 0.5 MG/2ML
0.5 INHALANT ORAL EVERY 12 HOURS
Status: DISCONTINUED | OUTPATIENT
Start: 2020-03-02 | End: 2020-03-03 | Stop reason: HOSPADM

## 2020-03-01 RX ORDER — BUDESONIDE AND FORMOTEROL FUMARATE DIHYDRATE 160; 4.5 UG/1; UG/1
2 AEROSOL RESPIRATORY (INHALATION) EVERY 12 HOURS
Status: DISCONTINUED | OUTPATIENT
Start: 2020-03-01 | End: 2020-03-01

## 2020-03-01 RX ORDER — TALC
6 POWDER (GRAM) TOPICAL NIGHTLY PRN
Status: DISCONTINUED | OUTPATIENT
Start: 2020-03-01 | End: 2020-03-03 | Stop reason: HOSPADM

## 2020-03-01 RX ORDER — FINASTERIDE 5 MG/1
5 TABLET, FILM COATED ORAL DAILY
Status: DISCONTINUED | OUTPATIENT
Start: 2020-03-02 | End: 2020-03-03 | Stop reason: HOSPADM

## 2020-03-01 RX ORDER — ENALAPRILAT 1.25 MG/ML
2.5 INJECTION INTRAVENOUS
Status: COMPLETED | OUTPATIENT
Start: 2020-03-01 | End: 2020-03-01

## 2020-03-01 RX ORDER — ASPIRIN 81 MG/1
81 TABLET ORAL DAILY
Status: DISCONTINUED | OUTPATIENT
Start: 2020-03-02 | End: 2020-03-03 | Stop reason: HOSPADM

## 2020-03-01 RX ORDER — ARFORMOTEROL TARTRATE 15 UG/2ML
15 SOLUTION RESPIRATORY (INHALATION) 2 TIMES DAILY
Status: DISCONTINUED | OUTPATIENT
Start: 2020-03-02 | End: 2020-03-03 | Stop reason: HOSPADM

## 2020-03-01 RX ADMIN — ACETAMINOPHEN 650 MG: 325 TABLET ORAL at 09:03

## 2020-03-01 RX ADMIN — NITROGLYCERIN 1 INCH: 20 OINTMENT TOPICAL at 08:03

## 2020-03-01 RX ADMIN — ASPIRIN 81 MG 324 MG: 81 TABLET ORAL at 07:03

## 2020-03-01 RX ADMIN — ENALAPRILAT 2.5 MG: 1.25 INJECTION INTRAVENOUS at 09:03

## 2020-03-01 RX ADMIN — METOPROLOL SUCCINATE 50 MG: 50 TABLET, EXTENDED RELEASE ORAL at 09:03

## 2020-03-01 RX ADMIN — NITROGLYCERIN 1 INCH: 20 OINTMENT TOPICAL at 07:03

## 2020-03-01 RX ADMIN — ENOXAPARIN SODIUM 40 MG: 100 INJECTION SUBCUTANEOUS at 09:03

## 2020-03-01 RX ADMIN — LABETALOL HYDROCHLORIDE 20 MG: 5 INJECTION INTRAVENOUS at 08:03

## 2020-03-01 NOTE — ED PROVIDER NOTES
SCRIBE #1 NOTE: I, Cornell Mccarthy, am scribing for, and in the presence of, Martha Toscano MD. I have scribed the entire note.      History      Chief Complaint   Patient presents with    Chest Pain     chest pain x 2 weeks       Review of patient's allergies indicates:   Allergen Reactions    Lipitor [atorvastatin] Other (See Comments)     Muscle aches and pains as well as fatigue.     Niacin preparations Other (See Comments)     Causes broken blood vessels and bruises    Iodinated contrast media Hives     Tachycardia    Omeprazole Hives and Itching    Prilosec [omeprazole magnesium] Hives and Itching        HPI   HPI    3/1/2020, 7:34 AM   History obtained from the patient      History of Present Illness: Erendira Pretty is a 74 y.o. male patient with a PMHx of DM type II, hyperlipidemia, and HTN who presents to the Emergency Department for chest tightness which onset gradually 5 hours ago. Pt states he has been under a lot of stress recently, while also feeling sluggish and fatigue, which has made his stomach to bother him --acid reflux. Pt states he woke up at 3am with acid reflux then took meds. Pt states at 5 am he woke up with CP. Pt states his BP has been raising (nml 130/70) with a HA (back of head). Symptoms are intermittent and moderate in severity. No mitigating or exacerbating factors reported. Associated sxs include CP and HA. Patient denies any diaphoresis, cough, SOB, palpitations, leg swelling/pain, N/V, dizziness, extremity weakness/numbness, and all other sxs at this time. No prior Tx included. Pt states he had a stress test and open heart surgery (Nov. 2018). No further complaints or concerns at this time.         Arrival mode: Personal vehicle     PCP: Cortes Roblero MD       Past Medical History:  Past Medical History:   Diagnosis Date    Aortic stenosis     Asthma     BPH (benign prostatic hyperplasia)     CAD (coronary artery disease)     40% lesion 2002;[facility]azo    Cirrhosis      Claudication 4/9/2014    Colon polyp     COPD (chronic obstructive pulmonary disease)     ED (erectile dysfunction)     Encounter for blood transfusion     Ex-smoker     Hearing loss NEC     Hepatitis C     Cured;reji brown 2015    Hyperlipidemia     Hypertension     Hypothyroid     s/p tx graves    DELONTE on CPAP     Osteoarthritis     PVD (peripheral vascular disease)     Type 2 diabetes mellitus        Past Surgical History:  Past Surgical History:   Procedure Laterality Date    CARDIAC CATHETERIZATION      CARDIAC SURGERY      CARPAL TUNNEL RELEASE Left     CATARACT EXTRACTION      CATARACT EXTRACTION W/ INTRAOCULAR LENS  IMPLANT, BILATERAL  2008    CATHETERIZATION OF BOTH LEFT AND RIGHT HEART N/A 11/2/2018    Procedure: CATHETERIZATION, HEART, BOTH LEFT AND RIGHT;  Surgeon: Frank Gallardo MD;  Location: [facility] CATH LAB;  Service: Cardiology;  Laterality: N/A;    COLONOSCOPY  8/2013    COLONOSCOPY N/A 5/30/2016    Procedure: COLONOSCOPY;  Surgeon: Anna Tomas MD;  Location: [facility] ENDO;  Service: Endoscopy;  Laterality: N/A;    COLONOSCOPY N/A 7/17/2019    Procedure: COLONOSCOPY;  Surgeon: David Carter III, MD;  Location: [facility] ENDO;  Service: Endoscopy;  Laterality: N/A;    COMPUTED TOMOGRAPHY N/A 9/9/2019    Procedure: CT (COMPUTED TOMOGRAPHY);  Surgeon: [facility] Diagnostic Provider;  Location: [facility] OR;  Service: General;  Laterality: N/A;  Microwave ablation to be done in CT.  Need CRNA and cart    CORONARY ARTERY BYPASS GRAFT (CABG) N/A 11/5/2018    Procedure: CORONARY ARTERY BYPASS GRAFT (CABG);  Surgeon: Bear De Luna MD;  Location: [facility] OR;  Service: Cardiothoracic;  Laterality: N/A;  TWO VESSEL BYPASS WITH AORTOTOMY AND EXCISION OF ATHEROSCLEROTIC PLAQUE    ELBOW BURSA SURGERY Right 2010    ENDOSCOPIC HARVEST OF VEIN Left 11/5/2018    Procedure: SURGICAL PROCUREMENT, VEIN, ENDOSCOPIC;  Surgeon: Bear De Luna MD;  Location: [facility] OR;  Service: Cardiothoracic;   Laterality: Left;    ESOPHAGOGASTRODUODENOSCOPY N/A 2019    Procedure: ESOPHAGOGASTRODUODENOSCOPY (EGD);  Surgeon: David Carter III, MD;  Location: [facility] ENDO;  Service: Endoscopy;  Laterality: N/A;    ETHMOIDECTOMY Bilateral 3/27/2019    Procedure: ETHMOIDECTOMY;  Surgeon: Alejandro Parkinson MD;  Location: [facility] OR;  Service: ENT;  Laterality: Bilateral;    EYE SURGERY      FRONTAL SINUS OBLITERATION Bilateral 3/27/2019    Procedure: SINUSOTOMY, FRONTAL SINUS, OBLITERATIVE;  Surgeon: Alejandro Parkinson MD;  Location: [facility] OR;  Service: ENT;  Laterality: Bilateral;    FUNCTIONAL ENDOSCOPIC SINUS SURGERY (FESS) Bilateral 3/27/2019    Procedure: FESS (FUNCTIONAL ENDOSCOPIC SINUS SURGERY);  Surgeon: Alejandro Parkinson MD;  Location: [facility] OR;  Service: ENT;  Laterality: Bilateral;  Left Keila bullosa resection     KNEE ARTHROSCOPY W/ MENISCAL REPAIR Left 2019    dr boykin    KNEE SURGERY Right     MAXILLARY ANTROSTOMY Bilateral 3/27/2019    Procedure: MAXILLARY ANTROSTOMY;  Surgeon: Aeljandro Parkinson MD;  Location: [facility] OR;  Service: ENT;  Laterality: Bilateral;    NASAL SEPTOPLASTY N/A 3/27/2019    Procedure: SEPTOPLASTY, NOSE;  Surgeon: Alejandro Parkinson MD;  Location: [facility] OR;  Service: ENT;  Laterality: N/A;    RECTAL SURGERY      TRANSURETHRAL RESECTION OF PROSTATE (TURP) WITHOUT USE OF LASER N/A 2018    Procedure: TURP, WITHOUT USING LASER;  Surgeon: Cooper Cordova IV, MD;  Location: [facility] OR;  Service: Urology;  Laterality: N/A;    TRIGGER FINGER RELEASE Left          Family History:  Family History   Problem Relation Age of Onset    Heart disease Mother     Heart disease Father     Heart disease Brother     Colon cancer Neg Hx        Social History:  Social History     Tobacco Use    Smoking status: Former Smoker     Packs/day: 0.50     Years: 4.00     Pack years: 2.00     Last attempt to quit: 1972     Years since quittin.7    Smokeless tobacco: Former User     Types: Chew     Quit date: 1976    Substance and Sexual Activity    Alcohol use: Yes     Alcohol/week: 2.0 standard drinks     Types: 2 Cans of beer per week     Comment: daily  No alcohol prior to surgery    Drug use: No    Sexual activity: Yes     Partners: Female       ROS   Review of Systems   Constitutional: Negative for diaphoresis and fever.   HENT: Negative for sore throat.    Respiratory: Positive for chest tightness. Negative for cough and shortness of breath.    Cardiovascular: Positive for chest pain. Negative for palpitations and leg swelling.        (+) chest tightness   Gastrointestinal: Negative for nausea and vomiting.   Genitourinary: Negative for dysuria.   Musculoskeletal: Negative for back pain.        (-) leg pain   Skin: Negative for rash.   Neurological: Positive for headaches. Negative for dizziness, weakness and numbness.   Hematological: Does not bruise/bleed easily.   All other systems reviewed and are negative.    Physical Exam      Initial Vitals   BP Pulse Resp Temp SpO2   03/01/20 0717 03/01/20 0716 03/01/20 0717 03/01/20 0720 03/01/20 0716   (!) 221/102 76 18 98.9 °F (37.2 °C) 98 %      MAP       --                 Physical Exam  Nursing Notes and Vital Signs Reviewed.  Constitutional: Patient is in no acute distress. Well-developed and well-nourished.  Head: Atraumatic. Normocephalic.  Eyes: PERRL. EOM intact. Conjunctivae are not pale. No scleral icterus.  ENT: Mucous membranes are moist. Oropharynx is clear and symmetric.    Neck: Supple. Full ROM. No lymphadenopathy.  Cardiovascular: Regular rate. Regular rhythm. Known murmur. No rubs, or gallops. Distal pulses are 2+ and symmetric.   Pulmonary/Chest: No respiratory distress. Clear to auscultation bilaterally. No wheezing or rales.  Abdominal: Soft and non-distended.  There is no tenderness.  No rebound, guarding, or rigidity. Good bowel sounds.  Genitourinary: No CVA tenderness  Musculoskeletal: Moves all extremities. No obvious deformities. Trace edema. No  calf tenderness.  Skin: Warm and dry.  Neurological:  Alert, awake, and appropriate.  Normal speech.  No acute focal neurological deficits are appreciated.  Psychiatric: Normal affect. Good eye contact. Appropriate in content.    ED Course    Procedures  ED Vital Signs:  Vitals:    03/01/20 0800 03/01/20 0820 03/01/20 0830 03/01/20 0845   BP: (!) 208/115 (!) 188/91 (!) 180/97 (!) 193/92   Pulse: 69 74 71 68   Resp: 15 19 20 13   Temp:       TempSrc:       SpO2: 97% 97% 96% 97%   Weight:        03/01/20 0900 03/01/20 0915 03/01/20 0950 03/01/20 1000   BP: (!) 204/91 (!) 213/101 (!) 173/79 138/70   Pulse: 67 71 71 69   Resp: 19 13 17 18   Temp:       TempSrc:       SpO2: 97% 98% 96% 97%   Weight:        03/01/20 1020 03/01/20 1030 03/01/20 1040 03/01/20 1045   BP: 119/70 139/84  118/73   Pulse: 69 71  69   Resp: 18 16 18 19   Temp:       TempSrc:       SpO2: 97% 97%  96%   Weight:        03/01/20 1130 03/01/20 1200 03/01/20 1248   BP: (!) 146/76 (!) 144/80 (!) 158/100   Pulse: 69 70 70   Resp: 18 (!) 21 (!) 30   Temp:   98.5 °F (36.9 °C)   TempSrc:   Oral   SpO2: 97% 95% 98%   Weight:          Abnormal Lab Results:  Labs Reviewed   CBC W/ AUTO DIFFERENTIAL - Abnormal; Notable for the following components:       Result Value    Mean Corpuscular Hemoglobin 32.3 (*)     Platelets 135 (*)     All other components within normal limits   COMPREHENSIVE METABOLIC PANEL - Abnormal; Notable for the following components:    Glucose 177 (*)     All other components within normal limits   B-TYPE NATRIURETIC PEPTIDE - Abnormal; Notable for the following components:     (*)     All other components within normal limits   TROPONIN I   PROTIME-INR   URINALYSIS, REFLEX TO URINE CULTURE    Narrative:     Preferred Collection Type->Urine, Clean Catch   TROPONIN I        All Lab Results:  Results for orders placed or performed during the hospital encounter of 03/01/20   CBC auto differential   Result Value Ref Range    WBC 4.78  3.90 - 12.70 K/uL    RBC 4.71 4.60 - 6.20 M/uL    Hemoglobin 15.2 14.0 - 18.0 g/dL    Hematocrit 44.1 40.0 - 54.0 %    Mean Corpuscular Volume 94 82 - 98 fL    Mean Corpuscular Hemoglobin 32.3 (H) 27.0 - 31.0 pg    Mean Corpuscular Hemoglobin Conc 34.5 32.0 - 36.0 g/dL    RDW 12.4 11.5 - 14.5 %    Platelets 135 (L) 150 - 350 K/uL    MPV 10.0 9.2 - 12.9 fL    Immature Granulocytes 0.4 0.0 - 0.5 %    Gran # (ANC) 2.7 1.8 - 7.7 K/uL    Immature Grans (Abs) 0.02 0.00 - 0.04 K/uL    Lymph # 1.4 1.0 - 4.8 K/uL    Mono # 0.5 0.3 - 1.0 K/uL    Eos # 0.1 0.0 - 0.5 K/uL    Baso # 0.03 0.00 - 0.20 K/uL    nRBC 0 0 /100 WBC    Gran% 56.6 38.0 - 73.0 %    Lymph% 29.9 18.0 - 48.0 %    Mono% 9.6 4.0 - 15.0 %    Eosinophil% 2.9 0.0 - 8.0 %    Basophil% 0.6 0.0 - 1.9 %    Differential Method Automated    Comprehensive metabolic panel   Result Value Ref Range    Sodium 139 136 - 145 mmol/L    Potassium 3.9 3.5 - 5.1 mmol/L    Chloride 101 95 - 110 mmol/L    CO2 28 23 - 29 mmol/L    Glucose 177 (H) 70 - 110 mg/dL    BUN, Bld 14 8 - 23 mg/dL    Creatinine 1.1 0.5 - 1.4 mg/dL    Calcium 9.3 8.7 - 10.5 mg/dL    Total Protein 8.1 6.0 - 8.4 g/dL    Albumin 4.0 3.5 - 5.2 g/dL    Total Bilirubin 0.4 0.1 - 1.0 mg/dL    Alkaline Phosphatase 85 55 - 135 U/L    AST 28 10 - 40 U/L    ALT 33 10 - 44 U/L    Anion Gap 10 8 - 16 mmol/L    eGFR if African American >60 >60 mL/min/1.73 m^2    eGFR if non African American >60 >60 mL/min/1.73 m^2   Brain natriuretic peptide   Result Value Ref Range     (H) 0 - 99 pg/mL   Troponin I   Result Value Ref Range    Troponin I 0.019 0.000 - 0.026 ng/mL   Protime-INR   Result Value Ref Range    Prothrombin Time 10.4 9.0 - 12.5 sec    INR 1.0 0.8 - 1.2   Urinalysis, Reflex to Urine Culture Urine, Clean Catch   Result Value Ref Range    Specimen UA Urine, Clean Catch     Color, UA Yellow Yellow, Straw, Shayy    Appearance, UA Clear Clear    pH, UA 7.0 5.0 - 8.0    Specific Gravity, UA 1.010 1.005 - 1.030     Protein, UA Negative Negative    Glucose, UA Negative Negative    Ketones, UA Negative Negative    Bilirubin (UA) Negative Negative    Occult Blood UA Negative Negative    Nitrite, UA Negative Negative    Urobilinogen, UA Negative <2.0 EU/dL    Leukocytes, UA Negative Negative   Troponin I   Result Value Ref Range    Troponin I 0.023 0.000 - 0.026 ng/mL     *Note: Due to a large number of results and/or encounters for the requested time period, some results have not been displayed. A complete set of results can be found in Results Review.         Imaging Results:  Imaging Results          X-Ray Chest PA And Lateral (Final result)  Result time 03/01/20 09:18:46    Final result by Vincent Gonzalez Jr., MD (03/01/20 09:18:46)                 Impression:      No acute findings.      Electronically signed by: Vincent Gonzalez Jr., MD  Date:    03/01/2020  Time:    09:18             Narrative:    EXAMINATION:  XR CHEST PA AND LATERAL    CLINICAL HISTORY:  Chest Pain;    TECHNIQUE:  PA and lateral views of the chest.    COMPARISON:  Prior radiograph from September 27, 2019.    FINDINGS:  Prior median sternotomy.  The lungs are clear.  No pleural fluid or pneumothorax.    The cardiac silhouette is normal in size. The hilar and mediastinal contours are normal.    No concerning osseous findings.                               The EKG was ordered, reviewed, and independently interpreted by the ED provider.  Interpretation time: 7:12  Rate: 78 BPM  Rhythm: Sinus rhythm with marked sinus arrhythmia with 1st degree AV block  Interpretation: Otherwise normal ECG. No STEMI.         The Emergency Provider reviewed the vital signs and test results, which are outlined above.    ED Discussion     12:33 PM: Reassessed pt at this time.  Pt states his condition has improved at this time. Discussed with pt all pertinent ED information and results. Discussed pt dx and plan of tx. Gave pt all f/u and return to the ED instructions. All questions  and concerns were addressed at this time. Pt expresses understanding of information and instructions, and is comfortable with plan to discharge. Pt is stable for discharge.    I discussed with patient and/or family/caretaker that evaluation in the ED does not suggest any emergent or life threatening medical conditions requiring immediate intervention beyond what was provided in the ED, and I believe patient is safe for discharge.  Regardless, an unremarkable evaluation in the ED does not preclude the development or presence of a serious of life threatening condition. As such, patient was instructed to return immediately for any worsening or change in current symptoms.    Pre-hypertension/Hypertension: The pt has been informed that they may have pre-hypertension or hypertension based on a blood pressure reading in the ED. I recommend that the pt call the PCP listed on their discharge instructions or a physician of their choice this week to arrange f/u for further evaluation of possible pre-hypertension or hypertension.        ED Medication(s):  Medications   aspirin chewable tablet 324 mg (324 mg Oral Given 3/1/20 0740)   labetalol injection 20 mg (20 mg Intravenous Given 3/1/20 0803)   nitroGLYCERIN 2% TD oint ointment 1 inch (1 inch Topical (Top) Given 3/1/20 0800)   enalaprilat injection 2.5 mg (2.5 mg Intravenous Given 3/1/20 0938)     Discharge Medication List as of 3/1/2020 12:35 PM      START taking these medications    Details   lisinopril 10 MG tablet Take 1 tablet (10 mg total) by mouth once daily., Starting Sun 3/1/2020, Print              Medication List      START taking these medications    lisinopril 10 MG tablet  Take 1 tablet (10 mg total) by mouth once daily.        ASK your doctor about these medications    Accu-Chek Jyotsna Plus Meter [facility]  Generic drug:  blood-glucose meter  AS DIRECTED     albuterol-ipratropium 2.5 mg-0.5 mg/3 mL nebulizer solution  Commonly known as:  DUO-NEB  Take 3 mLs by  nebulization 2 (two) times daily. Rescue     azelastine 137 mcg (0.1 %) nasal spray  Commonly known as:  ASTELIN  2 sprays (274 mcg total) by Nasal route 2 (two) times daily.     blood sugar diagnostic Strp  Commonly known as:  Accu-Chek Jyotsna Plus test strp  TEST THREE TIMES A DAY     budesonide-formoterol 160-4.5 mcg 160-4.5 mcg/actuation Hfaa  Commonly known as:  Symbicort  INHALE 2 PUFFS INTO THE LUNGS TWO TIMES A DAY     CENTR SPECIALIST HEART ORAL     diclofenac sodium 1 % Gel  Commonly known as:  Voltaren  Apply 2 g topically 4 (four) times daily.     docusate sodium 100 MG capsule  Commonly known as:  COLACE  Take 1 capsule (100 mg total) by mouth 2 (two) times daily.     finasteride 5 mg tablet  Commonly known as:  PROSCAR  Take 1 tablet (5 mg total) by mouth once daily.     Fluad 3271-3114 (65 yr up)(PF) 45 mcg (15 mcg x 3)/0.5 mL Syrg  Generic drug:  flu vac 2019 [id number](PF)     furosemide 40 MG tablet  Commonly known as:  LASIX  Take 1 tablet (40 mg) by mouth 2 times a day and 1 extra if needed for 14 days.     HumaLOG KwikPen Insulin 100 unit/mL pen  Generic drug:  insulin lispro  INJECT 3-4 UNITS INTO THE SKIN THREE TIMES DAILY BEFORE MEALS.     HYDROcodone-acetaminophen  mg per tablet  Commonly known as:  NORCO  Take 1 tablet by mouth every 4 (four) hours as needed for Pain.     insulin glargine 100 units/mL (3mL) SubQ pen  Commonly known as:  Lantus Solostar U-100 Insulin  Inject 40 Units into the skin once daily.     krill oil 500 mg Cap     lancets Misc  Commonly known as:  Accu-Chek Fastclix Lancet Drum  TEST TWO TIMES A DAY     levocetirizine 5 MG tablet  Commonly known as:  XYZAL  TAKE ONE TABLET BY MOUTH EVERY EVENING     metoprolol succinate 50 MG 24 hr tablet  Commonly known as:  TOPROL-XL  TAKE 1 TABLET BY MOUTH TWO TIMES A DAY     milk thistle 175 mg tablet     mometasone 50 mcg/actuation nasal spray  Commonly known as:  NASONEX  2 sprays by Nasal route once daily.    "  montelukast 10 mg tablet  Commonly known as:  SINGULAIR  TAKE ONE TABLET BY MOUTH EVERY DAY     OSTEO BI-FLEX ORAL     pen needle, diabetic 31 gauge x 3/16" Ndle  Commonly known as:  BD Ultra-Fine Mini Pen Needle  USE AS DIRECTED     polyethylene glycol 17 gram/dose powder  Commonly known as:  GLYCOLAX  Take 17 g ( 1 capful) by mouth once daily.     predniSONE 20 MG tablet  Commonly known as:  DELTASONE  Take 1 tablet (20 mg total) by mouth As instructed. 3tab po daily x3days,2tabs po daily x3days,1tab po daily x3days,1/2tab po daily x3days     RABEprazole 20 mg tablet  Commonly known as:  ACIPHEX     * sildenafil 20 mg Tab  Commonly known as:  REVATIO  Take 2 tables by mouth one hour prior to intercourse.  Max 2 per 24 hours     * sildenafil 20 mg Tab  Commonly known as:  REVATIO  Take 2 tablets one hour prior to intercourse. Max two per 24 hours     Synthroid 137 MCG Tab tablet  Generic drug:  levothyroxine  TAKE 2 TABLETS BY MOUTH BEFORE BREAKFAST.         * This list has 2 medication(s) that are the same as other medications prescribed for you. Read the directions carefully, and ask your doctor or other care provider to review them with you.               Where to Get Your Medications      You can get these medications from any pharmacy    Bring a paper prescription for each of these medications  · lisinopril 10 MG tablet         Follow-up Information     Cortes Roblero MD In 1 day.    Specialty:  Family Medicine  Why:  As scheduled  Contact information:  [address]  [place] [address] [phone]             [facility] - .    Specialty:  Emergency Medicine  Why:  As needed, If symptoms worsen  Contact information:  [address] [address] [phone]                   Medical Decision Making    Medical Decision Making:   Clinical Tests:   Lab Tests: Ordered and Reviewed  Radiological Study: Ordered and Reviewed  Medical Tests: Ordered and " Reviewed           Scribe Attestation:   Scribe #1: I performed the above scribed service and the documentation accurately describes the services I performed. I attest to the accuracy of the note.    Attending:   Physician Attestation Statement for Scribe #1: I, Martha Toscano MD, personally performed the services described in this documentation, as scribed by Cornell Mccarthy, in my presence, and it is both accurate and complete.          Clinical Impression       ICD-10-CM ICD-9-CM   1. Hypertension, unspecified type I10 401.9   2. Chest pain R07.9 786.50       Disposition:   Disposition: Discharged  Condition: Stable         Martha Toscano MD  03/01/20 [id number]

## 2020-03-01 NOTE — Clinical Note
240 ml injected throughout the case. 160 mL total wasted during the case. 400 mL total used in the case.

## 2020-03-01 NOTE — Clinical Note
The DP pulses are 2+ bilaterally. The PT pulses are 2+ bilaterally. The radial pulses are +2 bilaterally.

## 2020-03-01 NOTE — Clinical Note
Catheter is inserted into the aorta. Angiography performed of the aorta. Angiography performed via power injection with 30 mL contrast at 20 mL/sPower injection PSI was 650..

## 2020-03-01 NOTE — ED NOTES
Patient c/o chest pressure, hypertension, and increased blood glucose x 2 weeks.    Patient ambulated to ED room 02, patient assisted onto stretcher and changed into a gown. Patient placed on cardiac monitor, continuous pulse oximetry and automatic blood pressure cuff. Bed placed in low locked position, side rails up x 2, call light is within reach of patient or family, orientation to room and explanation of wait provided to family and patient, alarms set and turned on for monitor and pulse ox, awaiting MD evaluation and orders, will continue to monitor.    Patient identifies self as Erendira Pretty.    LOC: The patient is awake, alert and aware of environment with an appropriate affect, the patient is oriented x 3 and speaking appropriately.  APPEARANCE: Patient resting comfortably and in no acute distress, patient is clean and well groomed, patient's clothing is properly fastened.  SKIN: The skin is warm and dry, color consistent with ethnicity, patient has normal skin turgor and moist mucus membranes, skin intact, no breakdown or bruising noted.  MUSCULOSKELETAL: Patient moving all extremities well, no obvious swelling or deformities noted.  RESPIRATORY: Airway is open and patent, respirations are spontaneous, patient has a normal effort and rate, no accessory muscle use noted.  CARDIAC: Patient has a normal rate and rhythm, no periphreal edema noted, capillary refill < 3 seconds.  ABDOMEN: Soft and non tender to palpation, no distention noted.  NEUROLOGIC: PERRL, 3 mm bilaterally, eyes open spontaneously, behavior appropriate to situation, follows commands, facial expression symmetrical, bilateral hand grasp equal and even, purposeful motor response noted, normal sensation in all extremities when touched with a finger.

## 2020-03-02 PROBLEM — I20.0 UNSTABLE ANGINA: Status: ACTIVE | Noted: 2020-03-01

## 2020-03-02 LAB
ANION GAP SERPL CALC-SCNC: 10 MMOL/L (ref 8–16)
ANISOCYTOSIS BLD QL SMEAR: SLIGHT
AORTIC ROOT ANNULUS: 3.47 CM
APTT BLDCRRT: 27.9 SEC (ref 21–32)
ASCENDING AORTA: 3.42 CM
AV INDEX (PROSTH): 0.36
AV MEAN GRADIENT: 17 MMHG
AV PEAK GRADIENT: 29 MMHG
AV VALVE AREA: 1.17 CM2
AV VELOCITY RATIO: 0.36
BASOPHILS # BLD AUTO: 0.03 K/UL (ref 0–0.2)
BASOPHILS NFR BLD: 0.6 % (ref 0–1.9)
BSA FOR ECHO PROCEDURE: 2.31 M2
BUN SERPL-MCNC: 14 MG/DL (ref 8–23)
CALCIUM SERPL-MCNC: 8.8 MG/DL (ref 8.7–10.5)
CATH EF QUANTITATIVE: 70 %
CHLORIDE SERPL-SCNC: 103 MMOL/L (ref 95–110)
CO2 SERPL-SCNC: 28 MMOL/L (ref 23–29)
CREAT SERPL-MCNC: 0.9 MG/DL (ref 0.5–1.4)
CV ECHO LV RWT: 0.81 CM
DIFFERENTIAL METHOD: ABNORMAL
DOP CALC AO PEAK VEL: 2.7 M/S
DOP CALC AO VTI: 68 CM
DOP CALC LVOT AREA: 3.3 CM2
DOP CALC LVOT DIAMETER: 2.05 CM
DOP CALC LVOT PEAK VEL: 0.96 M/S
DOP CALC LVOT STROKE VOLUME: 79.83 CM3
DOP CALCLVOT PEAK VEL VTI: 24.2 CM
E WAVE DECELERATION TIME: 220.8 MSEC
E/A RATIO: 1.1
ECHO LV POSTERIOR WALL: 1.55 CM (ref 0.6–1.1)
EOSINOPHIL # BLD AUTO: 0.1 K/UL (ref 0–0.5)
EOSINOPHIL NFR BLD: 2.4 % (ref 0–8)
ERYTHROCYTE [DISTWIDTH] IN BLOOD BY AUTOMATED COUNT: 12.6 % (ref 11.5–14.5)
EST. GFR  (AFRICAN AMERICAN): >60 ML/MIN/1.73 M^2
EST. GFR  (NON AFRICAN AMERICAN): >60 ML/MIN/1.73 M^2
FRACTIONAL SHORTENING: 37 % (ref 28–44)
GLUCOSE SERPL-MCNC: 139 MG/DL (ref 70–110)
HCT VFR BLD AUTO: 40.3 % (ref 40–54)
HGB BLD-MCNC: 13.7 G/DL (ref 14–18)
IMM GRANULOCYTES # BLD AUTO: 0.01 K/UL (ref 0–0.04)
IMM GRANULOCYTES NFR BLD AUTO: 0.2 % (ref 0–0.5)
INR PPP: 1 (ref 0.8–1.2)
INTERVENTRICULAR SEPTUM: 1.47 CM (ref 0.6–1.1)
IVRT: 0.1 MSEC
LA MAJOR: 5.73 CM
LA MINOR: 5.3 CM
LA WIDTH: 4.6 CM
LEFT ATRIUM SIZE: 3.92 CM
LEFT ATRIUM VOLUME INDEX: 37.9 ML/M2
LEFT ATRIUM VOLUME: 84.4 CM3
LEFT INTERNAL DIMENSION IN SYSTOLE: 2.43 CM (ref 2.1–4)
LEFT VENTRICLE DIASTOLIC VOLUME INDEX: 28.41 ML/M2
LEFT VENTRICLE DIASTOLIC VOLUME: 63.29 ML
LEFT VENTRICLE MASS INDEX: 99 G/M2
LEFT VENTRICLE SYSTOLIC VOLUME INDEX: 9.3 ML/M2
LEFT VENTRICLE SYSTOLIC VOLUME: 20.8 ML
LEFT VENTRICULAR INTERNAL DIMENSION IN DIASTOLE: 3.83 CM (ref 3.5–6)
LEFT VENTRICULAR MASS: 221.28 G
LYMPHOCYTES # BLD AUTO: 1.8 K/UL (ref 1–4.8)
LYMPHOCYTES NFR BLD: 32.9 % (ref 18–48)
MCH RBC QN AUTO: 32.5 PG (ref 27–31)
MCHC RBC AUTO-ENTMCNC: 34 G/DL (ref 32–36)
MCV RBC AUTO: 96 FL (ref 82–98)
MONOCYTES # BLD AUTO: 0.5 K/UL (ref 0.3–1)
MONOCYTES NFR BLD: 9.6 % (ref 4–15)
MV PEAK A VEL: 0.99 M/S
MV PEAK E VEL: 1.09 M/S
NEUTROPHILS # BLD AUTO: 2.9 K/UL (ref 1.8–7.7)
NEUTROPHILS NFR BLD: 54.3 % (ref 38–73)
NRBC BLD-RTO: 0 /100 WBC
OVALOCYTES BLD QL SMEAR: ABNORMAL
PISA TR MAX VEL: 2.79 M/S
PLATELET # BLD AUTO: 136 K/UL (ref 150–350)
PLATELET BLD QL SMEAR: ABNORMAL
PMV BLD AUTO: 9.9 FL (ref 9.2–12.9)
POCT GLUCOSE: 162 MG/DL (ref 70–110)
POCT GLUCOSE: 205 MG/DL (ref 70–110)
POIKILOCYTOSIS BLD QL SMEAR: SLIGHT
POTASSIUM SERPL-SCNC: 3.9 MMOL/L (ref 3.5–5.1)
PROTHROMBIN TIME: 10.9 SEC (ref 9–12.5)
RA MAJOR: 4.37 CM
RA PRESSURE: 3 MMHG
RA WIDTH: 3.12 CM
RBC # BLD AUTO: 4.22 M/UL (ref 4.6–6.2)
SINUS: 3.59 CM
SODIUM SERPL-SCNC: 141 MMOL/L (ref 136–145)
STJ: 3.57 CM
TR MAX PG: 31 MMHG
TRICUSPID ANNULAR PLANE SYSTOLIC EXCURSION: 1.72 CM
TROPONIN I SERPL DL<=0.01 NG/ML-MCNC: 0.01 NG/ML (ref 0–0.03)
TROPONIN I SERPL DL<=0.01 NG/ML-MCNC: 0.01 NG/ML (ref 0–0.03)
TV REST PULMONARY ARTERY PRESSURE: 34 MMHG
WBC # BLD AUTO: 5.41 K/UL (ref 3.9–12.7)

## 2020-03-02 PROCEDURE — 99153 MOD SED SAME PHYS/QHP EA: CPT | Performed by: INTERNAL MEDICINE

## 2020-03-02 PROCEDURE — 25000003 PHARM REV CODE 250

## 2020-03-02 PROCEDURE — 93567 NJX CAR CTH SPRVLV AORTGRPHY: CPT | Performed by: INTERNAL MEDICINE

## 2020-03-02 PROCEDURE — 93005 ELECTROCARDIOGRAM TRACING: CPT

## 2020-03-02 PROCEDURE — 96374 THER/PROPH/DIAG INJ IV PUSH: CPT

## 2020-03-02 PROCEDURE — 25000242 PHARM REV CODE 250 ALT 637 W/ HCPCS: Performed by: INTERNAL MEDICINE

## 2020-03-02 PROCEDURE — 36415 COLL VENOUS BLD VENIPUNCTURE: CPT

## 2020-03-02 PROCEDURE — 25000003 PHARM REV CODE 250: Performed by: NURSE PRACTITIONER

## 2020-03-02 PROCEDURE — 85610 PROTHROMBIN TIME: CPT

## 2020-03-02 PROCEDURE — 25000003 PHARM REV CODE 250: Performed by: INTERNAL MEDICINE

## 2020-03-02 PROCEDURE — 99152 MOD SED SAME PHYS/QHP 5/>YRS: CPT | Performed by: INTERNAL MEDICINE

## 2020-03-02 PROCEDURE — 93010 ELECTROCARDIOGRAM REPORT: CPT | Mod: ,,, | Performed by: INTERNAL MEDICINE

## 2020-03-02 PROCEDURE — 84484 ASSAY OF TROPONIN QUANT: CPT | Mod: 91

## 2020-03-02 PROCEDURE — 94640 AIRWAY INHALATION TREATMENT: CPT

## 2020-03-02 PROCEDURE — 63600175 PHARM REV CODE 636 W HCPCS: Performed by: NURSE PRACTITIONER

## 2020-03-02 PROCEDURE — 93567 PR INJECT SUPRAVALVULAR AORTOGRAPHY DURING HEART CATH: ICD-10-PCS | Mod: ,,, | Performed by: INTERNAL MEDICINE

## 2020-03-02 PROCEDURE — 25500020 PHARM REV CODE 255

## 2020-03-02 PROCEDURE — S0028 INJECTION, FAMOTIDINE, 20 MG: HCPCS | Performed by: INTERNAL MEDICINE

## 2020-03-02 PROCEDURE — 85730 THROMBOPLASTIN TIME PARTIAL: CPT

## 2020-03-02 PROCEDURE — 84484 ASSAY OF TROPONIN QUANT: CPT

## 2020-03-02 PROCEDURE — 93567 NJX CAR CTH SPRVLV AORTGRPHY: CPT | Mod: ,,, | Performed by: INTERNAL MEDICINE

## 2020-03-02 PROCEDURE — 85025 COMPLETE CBC W/AUTO DIFF WBC: CPT

## 2020-03-02 PROCEDURE — C1894 INTRO/SHEATH, NON-LASER: HCPCS | Performed by: INTERNAL MEDICINE

## 2020-03-02 PROCEDURE — A4216 STERILE WATER/SALINE, 10 ML: HCPCS | Performed by: INTERNAL MEDICINE

## 2020-03-02 PROCEDURE — 21400001 HC TELEMETRY ROOM

## 2020-03-02 PROCEDURE — 25500020 PHARM REV CODE 255: Performed by: INTERNAL MEDICINE

## 2020-03-02 PROCEDURE — 63600175 PHARM REV CODE 636 W HCPCS: Performed by: INTERNAL MEDICINE

## 2020-03-02 PROCEDURE — 93010 EKG 12-LEAD: ICD-10-PCS | Mod: ,,, | Performed by: INTERNAL MEDICINE

## 2020-03-02 PROCEDURE — S0028 INJECTION, FAMOTIDINE, 20 MG: HCPCS

## 2020-03-02 PROCEDURE — 99152 PR MOD CONSCIOUS SEDATION, SAME PHYS, 5+ YRS, FIRST 15 MIN: ICD-10-PCS | Mod: ,,, | Performed by: INTERNAL MEDICINE

## 2020-03-02 PROCEDURE — 80048 BASIC METABOLIC PNL TOTAL CA: CPT

## 2020-03-02 PROCEDURE — 99223 1ST HOSP IP/OBS HIGH 75: CPT | Mod: 25,,, | Performed by: INTERNAL MEDICINE

## 2020-03-02 PROCEDURE — 93459 L HRT ART/GRFT ANGIO: CPT | Performed by: INTERNAL MEDICINE

## 2020-03-02 PROCEDURE — 96375 TX/PRO/DX INJ NEW DRUG ADDON: CPT

## 2020-03-02 PROCEDURE — 99223 PR INITIAL HOSPITAL CARE,LEVL III: ICD-10-PCS | Mod: 25,,, | Performed by: INTERNAL MEDICINE

## 2020-03-02 PROCEDURE — 63600175 PHARM REV CODE 636 W HCPCS

## 2020-03-02 PROCEDURE — 99152 MOD SED SAME PHYS/QHP 5/>YRS: CPT | Mod: ,,, | Performed by: INTERNAL MEDICINE

## 2020-03-02 PROCEDURE — 93459 L HRT ART/GRFT ANGIO: CPT | Mod: 26,,, | Performed by: INTERNAL MEDICINE

## 2020-03-02 PROCEDURE — 63700000 PHARM REV CODE 250 ALT 637 W/O HCPCS: Performed by: NURSE PRACTITIONER

## 2020-03-02 PROCEDURE — C1769 GUIDE WIRE: HCPCS | Performed by: INTERNAL MEDICINE

## 2020-03-02 PROCEDURE — 93459: ICD-10-PCS | Mod: 26,,, | Performed by: INTERNAL MEDICINE

## 2020-03-02 PROCEDURE — S0028 INJECTION, FAMOTIDINE, 20 MG: HCPCS | Performed by: NURSE PRACTITIONER

## 2020-03-02 RX ORDER — HEPARIN SODIUM,PORCINE/D5W 25000/250
12 INTRAVENOUS SOLUTION INTRAVENOUS CONTINUOUS
Status: DISCONTINUED | OUTPATIENT
Start: 2020-03-02 | End: 2020-03-02

## 2020-03-02 RX ORDER — LIDOCAINE HYDROCHLORIDE 20 MG/ML
INJECTION, SOLUTION EPIDURAL; INFILTRATION; INTRACAUDAL; PERINEURAL
Status: DISCONTINUED | OUTPATIENT
Start: 2020-03-02 | End: 2020-03-02 | Stop reason: HOSPADM

## 2020-03-02 RX ORDER — MIDAZOLAM HYDROCHLORIDE 1 MG/ML
INJECTION, SOLUTION INTRAMUSCULAR; INTRAVENOUS
Status: DISCONTINUED | OUTPATIENT
Start: 2020-03-02 | End: 2020-03-02 | Stop reason: HOSPADM

## 2020-03-02 RX ORDER — DEXTROSE MONOHYDRATE 100 MG/ML
12.5 INJECTION, SOLUTION INTRAVENOUS
Status: DISCONTINUED | OUTPATIENT
Start: 2020-03-02 | End: 2020-03-03 | Stop reason: HOSPADM

## 2020-03-02 RX ORDER — HYDROMORPHONE HYDROCHLORIDE 2 MG/ML
INJECTION, SOLUTION INTRAMUSCULAR; INTRAVENOUS; SUBCUTANEOUS
Status: DISCONTINUED | OUTPATIENT
Start: 2020-03-02 | End: 2020-03-02 | Stop reason: HOSPADM

## 2020-03-02 RX ORDER — FENTANYL CITRATE 50 UG/ML
INJECTION, SOLUTION INTRAMUSCULAR; INTRAVENOUS
Status: DISCONTINUED | OUTPATIENT
Start: 2020-03-02 | End: 2020-03-02 | Stop reason: HOSPADM

## 2020-03-02 RX ORDER — SODIUM CHLORIDE 9 MG/ML
INJECTION, SOLUTION INTRAVENOUS CONTINUOUS
Status: ACTIVE | OUTPATIENT
Start: 2020-03-02 | End: 2020-03-03

## 2020-03-02 RX ORDER — HEPARIN SODIUM 1000 [USP'U]/ML
INJECTION INTRAVENOUS; SUBCUTANEOUS
Status: DISCONTINUED | OUTPATIENT
Start: 2020-03-02 | End: 2020-03-02 | Stop reason: HOSPADM

## 2020-03-02 RX ORDER — VERAPAMIL HYDROCHLORIDE 2.5 MG/ML
INJECTION, SOLUTION INTRAVENOUS
Status: DISCONTINUED | OUTPATIENT
Start: 2020-03-02 | End: 2020-03-02 | Stop reason: HOSPADM

## 2020-03-02 RX ORDER — SODIUM CHLORIDE 9 MG/ML
INJECTION, SOLUTION INTRAMUSCULAR; INTRAVENOUS; SUBCUTANEOUS
Status: DISCONTINUED | OUTPATIENT
Start: 2020-03-02 | End: 2020-03-02 | Stop reason: HOSPADM

## 2020-03-02 RX ORDER — FAMOTIDINE 20 MG/50ML
20 INJECTION, SOLUTION INTRAVENOUS ONCE
Status: COMPLETED | OUTPATIENT
Start: 2020-03-02 | End: 2020-03-02

## 2020-03-02 RX ORDER — LABETALOL HYDROCHLORIDE 5 MG/ML
INJECTION, SOLUTION INTRAVENOUS
Status: DISCONTINUED | OUTPATIENT
Start: 2020-03-02 | End: 2020-03-02 | Stop reason: HOSPADM

## 2020-03-02 RX ORDER — NITROGLYCERIN 5 MG/ML
INJECTION, SOLUTION INTRAVENOUS
Status: DISCONTINUED | OUTPATIENT
Start: 2020-03-02 | End: 2020-03-02 | Stop reason: HOSPADM

## 2020-03-02 RX ORDER — DIPHENHYDRAMINE HYDROCHLORIDE 50 MG/ML
25 INJECTION INTRAMUSCULAR; INTRAVENOUS ONCE
Status: COMPLETED | OUTPATIENT
Start: 2020-03-02 | End: 2020-03-02

## 2020-03-02 RX ORDER — AMLODIPINE BESYLATE 5 MG/1
5 TABLET ORAL DAILY
Status: DISCONTINUED | OUTPATIENT
Start: 2020-03-03 | End: 2020-03-03 | Stop reason: HOSPADM

## 2020-03-02 RX ORDER — GLUCAGON 1 MG
1 KIT INJECTION
Status: DISCONTINUED | OUTPATIENT
Start: 2020-03-02 | End: 2020-03-03 | Stop reason: HOSPADM

## 2020-03-02 RX ORDER — FAMOTIDINE 10 MG/ML
INJECTION INTRAVENOUS
Status: DISCONTINUED | OUTPATIENT
Start: 2020-03-02 | End: 2020-03-02 | Stop reason: HOSPADM

## 2020-03-02 RX ORDER — DIPHENHYDRAMINE HYDROCHLORIDE 50 MG/ML
INJECTION INTRAMUSCULAR; INTRAVENOUS
Status: DISCONTINUED | OUTPATIENT
Start: 2020-03-02 | End: 2020-03-02 | Stop reason: HOSPADM

## 2020-03-02 RX ORDER — INSULIN ASPART 100 [IU]/ML
0-5 INJECTION, SOLUTION INTRAVENOUS; SUBCUTANEOUS EVERY 6 HOURS PRN
Status: DISCONTINUED | OUTPATIENT
Start: 2020-03-02 | End: 2020-03-03 | Stop reason: HOSPADM

## 2020-03-02 RX ORDER — SODIUM CHLORIDE 9 MG/ML
INJECTION, SOLUTION INTRAVENOUS CONTINUOUS
Status: DISCONTINUED | OUTPATIENT
Start: 2020-03-02 | End: 2020-03-02

## 2020-03-02 RX ADMIN — DIPHENHYDRAMINE HYDROCHLORIDE 25 MG: 50 INJECTION, SOLUTION INTRAMUSCULAR; INTRAVENOUS at 09:03

## 2020-03-02 RX ADMIN — SODIUM CHLORIDE: 0.9 INJECTION, SOLUTION INTRAVENOUS at 06:03

## 2020-03-02 RX ADMIN — METOPROLOL SUCCINATE 50 MG: 50 TABLET, EXTENDED RELEASE ORAL at 09:03

## 2020-03-02 RX ADMIN — BUDESONIDE 0.5 MG: 0.5 SUSPENSION RESPIRATORY (INHALATION) at 09:03

## 2020-03-02 RX ADMIN — SODIUM CHLORIDE: 0.9 INJECTION, SOLUTION INTRAVENOUS at 10:03

## 2020-03-02 RX ADMIN — BUDESONIDE 0.5 MG: 0.5 SUSPENSION RESPIRATORY (INHALATION) at 08:03

## 2020-03-02 RX ADMIN — NITROGLYCERIN 0.4 MG: 0.4 TABLET, ORALLY DISINTEGRATING SUBLINGUAL at 01:03

## 2020-03-02 RX ADMIN — SODIUM CHLORIDE: 0.9 INJECTION, SOLUTION INTRAVENOUS at 09:03

## 2020-03-02 RX ADMIN — ASPIRIN 81 MG: 81 TABLET, COATED ORAL at 09:03

## 2020-03-02 RX ADMIN — HEPARIN SODIUM 12 UNITS/KG/HR: 10000 INJECTION, SOLUTION INTRAVENOUS at 09:03

## 2020-03-02 RX ADMIN — LISINOPRIL 10 MG: 10 TABLET ORAL at 09:03

## 2020-03-02 RX ADMIN — FINASTERIDE 5 MG: 5 TABLET, FILM COATED ORAL at 09:03

## 2020-03-02 RX ADMIN — NITROGLYCERIN 0.5 INCH: 20 OINTMENT TOPICAL at 09:03

## 2020-03-02 RX ADMIN — METOPROLOL SUCCINATE 50 MG: 50 TABLET, EXTENDED RELEASE ORAL at 10:03

## 2020-03-02 RX ADMIN — LEVOTHYROXINE SODIUM 274 MCG: 50 TABLET ORAL at 06:03

## 2020-03-02 RX ADMIN — PANTOPRAZOLE SODIUM 40 MG: 40 TABLET, DELAYED RELEASE ORAL at 09:03

## 2020-03-02 RX ADMIN — FAMOTIDINE 20 MG: 20 INJECTION, SOLUTION INTRAVENOUS at 09:03

## 2020-03-02 RX ADMIN — METHYLPREDNISOLONE SODIUM SUCCINATE 40 MG: 40 INJECTION, POWDER, FOR SOLUTION INTRAMUSCULAR; INTRAVENOUS at 09:03

## 2020-03-02 RX ADMIN — ARFORMOTEROL TARTRATE 15 MCG: 15 SOLUTION RESPIRATORY (INHALATION) at 09:03

## 2020-03-02 RX ADMIN — ACETAMINOPHEN 650 MG: 325 TABLET ORAL at 03:03

## 2020-03-02 RX ADMIN — ARFORMOTEROL TARTRATE 15 MCG: 15 SOLUTION RESPIRATORY (INHALATION) at 08:03

## 2020-03-02 RX ADMIN — MONTELUKAST 10 MG: 10 TABLET, FILM COATED ORAL at 09:03

## 2020-03-02 NOTE — HPI
Erendira Pretty is a 74 year old male who presented to [facility] due to recurrent mid-sternal chest pain for the past 2 weeks. He was discharged from this ED yesterday and developed recurrent chest pain and returned to ED. His current medical conditions include CAD s/p CABG x2V (LIMA-LAD, RSVG-OM2) in 2018, HTN, HLP, DM Type II, COPD, DELONTE. ED workup revealed troponin negative x 2, EKG unrevealing. He was placed in observation under the care of hospital medicine. Cardiology consulted to assist with medical management. Chart reviewed, patient seen and examined. Denies chest pain on exam today. Will start IV heparin gtt for unstable angina and IVF's for pre-procedure hydration today. Keep NPO for now, ECHO pending.  Plan for C today for definitive evaluation. Further recs to follow.

## 2020-03-02 NOTE — ED PROVIDER NOTES
SCRIBE #1 NOTE: I, Halina Graham, am scribing for, and in the presence of, Ledy Davies MD. I have scribed the entire note.       History     Chief Complaint   Patient presents with    Chest Pain     seen here for same complaint this morning, discharged at noon; CP returned at 1700, described at tightness, denies n/v, denies SOB     Review of patient's allergies indicates:   Allergen Reactions    Lipitor [atorvastatin] Other (See Comments)     Muscle aches and pains as well as fatigue.     Niacin preparations Other (See Comments)     Causes broken blood vessels and bruises    Iodinated contrast media Hives     Tachycardia    Omeprazole Hives and Itching    Prilosec [omeprazole magnesium] Hives and Itching         History of Present Illness     HPI    3/1/2020, 6:31 PM  History obtained from the patient      History of Present Illness: Erendira Pretty is a 74 y.o. male patient with a PMHx of DM, COPD, and HTN who presents to the Emergency Department for evaluation of chest pain which onset several hours PTA. PSHx includes a double CABG in 11/18. Pt presented to the ED this morning for similar symptoms. Symptoms are constant and moderate in severity. No mitigating or exacerbating factors reported. Associated sxs include chest tightness and hypertension. Patient denies any n/v/d, leg swelling, shortness of breath, headache, dizziness, fever, chills, cough, and all other sxs at this time. Pt was given nitro patches in ED this morning. No further complaints or concerns at this time.       Arrival mode: Personal vehicle   PCP: Cortes Roblero MD        Past Medical History:  Past Medical History:   Diagnosis Date    Aortic stenosis     Asthma     BPH (benign prostatic hyperplasia)     CAD (coronary artery disease)     40% lesion 2002;[facility]    Cirrhosis     Claudication 4/9/2014    Colon polyp     COPD (chronic obstructive pulmonary disease)     ED (erectile dysfunction)     Encounter for  blood transfusion     Ex-smoker     Hearing loss NEC     Hepatitis C     Cured;reji brown 2015    Hyperlipidemia     Hypertension     Hypothyroid     s/p tx graves    DELONTE on CPAP     Osteoarthritis     PVD (peripheral vascular disease)     Type 2 diabetes mellitus        Past Surgical History:  Past Surgical History:   Procedure Laterality Date    CARDIAC CATHETERIZATION      CARDIAC SURGERY      CARPAL TUNNEL RELEASE Left     CATARACT EXTRACTION      CATARACT EXTRACTION W/ INTRAOCULAR LENS  IMPLANT, BILATERAL  2008    CATHETERIZATION OF BOTH LEFT AND RIGHT HEART N/A 11/2/2018    Procedure: CATHETERIZATION, HEART, BOTH LEFT AND RIGHT;  Surgeon: Frank Gallardo MD;  Location: [facility] CATH LAB;  Service: Cardiology;  Laterality: N/A;    COLONOSCOPY  8/2013    COLONOSCOPY N/A 5/30/2016    Procedure: COLONOSCOPY;  Surgeon: Anna Tomas MD;  Location: [facility] ENDO;  Service: Endoscopy;  Laterality: N/A;    COLONOSCOPY N/A 7/17/2019    Procedure: COLONOSCOPY;  Surgeon: David Carter III, MD;  Location: [facility] ENDO;  Service: Endoscopy;  Laterality: N/A;    COMPUTED TOMOGRAPHY N/A 9/9/2019    Procedure: CT (COMPUTED TOMOGRAPHY);  Surgeon: [facility] Diagnostic Provider;  Location: [facility] OR;  Service: General;  Laterality: N/A;  Microwave ablation to be done in CT.  Need CRNA and cart    CORONARY ARTERY BYPASS GRAFT (CABG) N/A 11/5/2018    Procedure: CORONARY ARTERY BYPASS GRAFT (CABG);  Surgeon: Bear De Luna MD;  Location: [facility] OR;  Service: Cardiothoracic;  Laterality: N/A;  TWO VESSEL BYPASS WITH AORTOTOMY AND EXCISION OF ATHEROSCLEROTIC PLAQUE    ELBOW BURSA SURGERY Right 2010    ENDOSCOPIC HARVEST OF VEIN Left 11/5/2018    Procedure: SURGICAL PROCUREMENT, VEIN, ENDOSCOPIC;  Surgeon: Bear De Luna MD;  Location: [facility] OR;  Service: Cardiothoracic;  Laterality: Left;    ESOPHAGOGASTRODUODENOSCOPY N/A 7/17/2019    Procedure: ESOPHAGOGASTRODUODENOSCOPY (EGD);  Surgeon: David Carter III, MD;   Location: [facility] ENDO;  Service: Endoscopy;  Laterality: N/A;    ETHMOIDECTOMY Bilateral 3/27/2019    Procedure: ETHMOIDECTOMY;  Surgeon: Alejandro Parkinson MD;  Location: [facility] OR;  Service: ENT;  Laterality: Bilateral;    EYE SURGERY      FRONTAL SINUS OBLITERATION Bilateral 3/27/2019    Procedure: SINUSOTOMY, FRONTAL SINUS, OBLITERATIVE;  Surgeon: Alejandro Parkinson MD;  Location: [facility] OR;  Service: ENT;  Laterality: Bilateral;    FUNCTIONAL ENDOSCOPIC SINUS SURGERY (FESS) Bilateral 3/27/2019    Procedure: FESS (FUNCTIONAL ENDOSCOPIC SINUS SURGERY);  Surgeon: Alejandro Parkinson MD;  Location: [facility] OR;  Service: ENT;  Laterality: Bilateral;  Left Keila bullosa resection     KNEE ARTHROSCOPY W/ MENISCAL REPAIR Left 2019    dr boykin    KNEE SURGERY Right     MAXILLARY ANTROSTOMY Bilateral 3/27/2019    Procedure: MAXILLARY ANTROSTOMY;  Surgeon: Alejandro Parkinson MD;  Location: [facility] OR;  Service: ENT;  Laterality: Bilateral;    NASAL SEPTOPLASTY N/A 3/27/2019    Procedure: SEPTOPLASTY, NOSE;  Surgeon: Alejandro Parkinson MD;  Location: [facility] OR;  Service: ENT;  Laterality: N/A;    RECTAL SURGERY      TRANSURETHRAL RESECTION OF PROSTATE (TURP) WITHOUT USE OF LASER N/A 2018    Procedure: TURP, WITHOUT USING LASER;  Surgeon: Cooper Cordova IV, MD;  Location: [facility];  Service: Urology;  Laterality: N/A;    TRIGGER FINGER RELEASE Left          Family History:  Family History   Problem Relation Age of Onset    Heart disease Mother     Heart disease Father     Heart disease Brother     Colon cancer Neg Hx        Social History:  Social History     Tobacco Use    Smoking status: Former Smoker     Packs/day: 0.50     Years: 4.00     Pack years: 2.00     Last attempt to quit: 1972     Years since quittin.7    Smokeless tobacco: Former User     Types: Chew     Quit date: 1976   Substance and Sexual Activity    Alcohol use: Yes     Alcohol/week: 2.0 standard drinks     Types: 2 Cans of beer per week     Comment: daily   No alcohol prior to surgery    Drug use: No    Sexual activity: Yes     Partners: Female        Review of Systems     Review of Systems   Constitutional: Negative for chills and fever.   HENT: Negative for sore throat.    Respiratory: Positive for chest tightness. Negative for cough and shortness of breath.    Cardiovascular: Positive for chest pain. Negative for leg swelling.   Gastrointestinal: Negative for diarrhea, nausea and vomiting.   Genitourinary: Negative for dysuria.   Musculoskeletal: Negative for back pain.   Skin: Negative for rash.   Neurological: Negative for dizziness, weakness and headaches.   Hematological: Does not bruise/bleed easily.      Physical Exam     Initial Vitals [03/01/20 1813]   BP Pulse Resp Temp SpO2   (!) 151/86 70 18 98.9 °F (37.2 °C) 96 %      MAP       --          Physical Exam  Nursing Notes and Vital Signs Reviewed.  Constitutional: Patient is in no acute distress. Well-developed and well-nourished.  Head: Atraumatic. Normocephalic.  Eyes: PERRL. EOM intact. Conjunctivae are not pale. No scleral icterus.  ENT: Mucous membranes are moist. Oropharynx is clear and symmetric.    Neck: Supple. Full ROM. No lymphadenopathy.  Cardiovascular: Regular rate. Regular rhythm. No murmurs, rubs, or gallops. Distal pulses are 2+ and symmetric.  Pulmonary/Chest: No respiratory distress. Clear to auscultation bilaterally. No wheezing or rales.  Abdominal: Soft and non-distended.  There is no tenderness.  No rebound, guarding, or rigidity.   Musculoskeletal: Moves all extremities. No obvious deformities. No edema. No calf tenderness.  Skin: Warm and dry.  Neurological:  Alert, awake, and appropriate.  Normal speech.  No acute focal neurological deficits are appreciated.  Psychiatric: Normal affect. Good eye contact. Appropriate in content.     ED Course   Procedures  ED Vital Signs:  Vitals:    03/01/20 1813 03/01/20 1834 03/01/20 1840 03/01/20 2002   BP: (!) 151/86 135/84  (!) 143/81    Pulse: 70 71 69 65   Resp: 18 20 19   Temp: 98.9 °F (37.2 °C)      TempSrc: Oral      SpO2: 96% 97%  97%   Weight: 112.1 kg (247 lb 3.9 oz)          Abnormal Lab Results:  Labs Reviewed   CBC W/ AUTO DIFFERENTIAL - Abnormal; Notable for the following components:       Result Value    RBC 4.46 (*)     Mean Corpuscular Hemoglobin 32.5 (*)     Platelets 145 (*)     All other components within normal limits   COMPREHENSIVE METABOLIC PANEL - Abnormal; Notable for the following components:    Glucose 150 (*)     All other components within normal limits   TROPONIN I        All Lab Results:  Results for orders placed or performed during the hospital encounter of 03/01/20   CBC auto differential   Result Value Ref Range    WBC 6.36 3.90 - 12.70 K/uL    RBC 4.46 (L) 4.60 - 6.20 M/uL    Hemoglobin 14.5 14.0 - 18.0 g/dL    Hematocrit 41.5 40.0 - 54.0 %    Mean Corpuscular Volume 93 82 - 98 fL    Mean Corpuscular Hemoglobin 32.5 (H) 27.0 - 31.0 pg    Mean Corpuscular Hemoglobin Conc 34.9 32.0 - 36.0 g/dL    RDW 12.5 11.5 - 14.5 %    Platelets 145 (L) 150 - 350 K/uL    MPV 10.0 9.2 - 12.9 fL    Immature Granulocytes 0.2 0.0 - 0.5 %    Gran # (ANC) 3.5 1.8 - 7.7 K/uL    Immature Grans (Abs) 0.01 0.00 - 0.04 K/uL    Lymph # 2.0 1.0 - 4.8 K/uL    Mono # 0.7 0.3 - 1.0 K/uL    Eos # 0.1 0.0 - 0.5 K/uL    Baso # 0.05 0.00 - 0.20 K/uL    nRBC 0 0 /100 WBC    Gran% 54.7 38.0 - 73.0 %    Lymph% 31.0 18.0 - 48.0 %    Mono% 11.3 4.0 - 15.0 %    Eosinophil% 2.0 0.0 - 8.0 %    Basophil% 0.8 0.0 - 1.9 %    Differential Method Automated    Comprehensive metabolic panel   Result Value Ref Range    Sodium 140 136 - 145 mmol/L    Potassium 3.7 3.5 - 5.1 mmol/L    Chloride 102 95 - 110 mmol/L    CO2 28 23 - 29 mmol/L    Glucose 150 (H) 70 - 110 mg/dL    BUN, Bld 15 8 - 23 mg/dL    Creatinine 1.2 0.5 - 1.4 mg/dL    Calcium 9.3 8.7 - 10.5 mg/dL    Total Protein 7.8 6.0 - 8.4 g/dL    Albumin 3.9 3.5 - 5.2 g/dL    Total Bilirubin 0.4 0.1 - 1.0  mg/dL    Alkaline Phosphatase 70 55 - 135 U/L    AST 26 10 - 40 U/L    ALT 32 10 - 44 U/L    Anion Gap 10 8 - 16 mmol/L    eGFR if African American >60 >60 mL/min/1.73 m^2    eGFR if non African American 60 >60 mL/min/1.73 m^2   Troponin I #1   Result Value Ref Range    Troponin I 0.018 0.000 - 0.026 ng/mL     *Note: Due to a large number of results and/or encounters for the requested time period, some results have not been displayed. A complete set of results can be found in Results Review.       The EKG was ordered, reviewed, and independently interpreted by the ED provider.  Interpretation time: 18:28  Rate: 75 BPM  Rhythm: Sinus rhythm with sinus arrhythmia with 1st degree AV block  Interpretation: Minimal voltage criteria for LVH, may be normal variant. Nonspecific T wave abnormality. No STEMI.    The EKG was ordered, reviewed, and independently interpreted by the ED provider.  Interpretation time: 19:17  Rate: 66 BPM  Rhythm: sinus rhythm with 1st degree AV block with premature atrial complexes  Interpretation: Minimal voltage criteria for LVH, may be normal variant. Nonspecific T wave abnormality. No STEMI.         The Emergency Provider reviewed the vital signs and test results, which are outlined above.     ED Discussion     7:45 PM: Discussed case with Cynthia Reeves NP (Hospital Medicine). Dr. Nelson agrees with current care and management of pt and accepts admission.   Admitting Service: Hospital Medicine  Admitting Physician: Dr. Nelson  Admit to: Obs Tele    7:46 PM: Re-evaluated pt. I have discussed test results, shared treatment plan, and the need for admission with patient and family at bedside. Pt and family express understanding at this time and agree with all information. All questions answered. Pt and family have no further questions or concerns at this time. Pt is ready for admit.       Medical Decision Making:   Clinical Tests:   Lab Tests: Ordered and Reviewed  Radiological Study: Ordered and  Reviewed  Medical Tests: Ordered and Reviewed           ED Medication(s):  Medications   nitroGLYCERIN 2% TD oint ointment 1 inch (1 inch Topical (Top) Given 3/1/20 1904)       New Prescriptions    No medications on file               Scribe Attestation:   Scribe #1: I performed the above scribed service and the documentation accurately describes the services I performed. I attest to the accuracy of the note.     Attending:   Physician Attestation Statement for Scribe #1: I, Ledy Davies MD, personally performed the services described in this documentation, as scribed by Halina Garham, in my presence, and it is both accurate and complete.           Clinical Impression       ICD-10-CM ICD-9-CM   1. Chest pain with high risk of acute coronary syndrome R07.9 786.50   2. Chest pain R07.9 786.50   3. Paroxysmal atrial fibrillation I48.0 427.31       Disposition:   Disposition: Placed in Observation  Condition: Fair         Ledy Davies MD  03/01/20 2014

## 2020-03-02 NOTE — ASSESSMENT & PLAN NOTE
Troponin negative  Worsening angina intensity and duration over past month since his wife passed away 1 month ago  Start IV heparin gtt   Start IVF's for pre-procedure hydration  Allergy to statins, will need to add Repatha/Praluent as OP  Keep NPO for now  Continue ASA, BB, ACEi, NTP  ECHO pending  Plans for LHC today for definitive evaluation  Risks, benefits and treatment alternatives reviewed with patient. Patient has agreed to proceed, consent obtained and placed on chart.  Further recs to follow.

## 2020-03-02 NOTE — SUBJECTIVE & OBJECTIVE
Past Medical History:   Diagnosis Date    Aortic stenosis     Asthma     BPH (benign prostatic hyperplasia)     CAD (coronary artery disease)     40% lesion 2002;lazo    Cirrhosis     Claudication 4/9/2014    Colon polyp     COPD (chronic obstructive pulmonary disease)     ED (erectile dysfunction)     Encounter for blood transfusion     Ex-smoker     Hearing loss NEC     Hepatitis C     Cured;reji brown 2015    Hyperlipidemia     Hypertension     Hypothyroid     s/p tx graves    DELONTE on CPAP     Osteoarthritis     PVD (peripheral vascular disease)     Type 2 diabetes mellitus        Past Surgical History:   Procedure Laterality Date    CARDIAC CATHETERIZATION      CARDIAC SURGERY      CARPAL TUNNEL RELEASE Left     CATARACT EXTRACTION      CATARACT EXTRACTION W/ INTRAOCULAR LENS  IMPLANT, BILATERAL  2008    CATHETERIZATION OF BOTH LEFT AND RIGHT HEART N/A 11/2/2018    Procedure: CATHETERIZATION, HEART, BOTH LEFT AND RIGHT;  Surgeon: Frank Lazo MD;  Location: [facility] CATH LAB;  Service: Cardiology;  Laterality: N/A;    COLONOSCOPY  8/2013    COLONOSCOPY N/A 5/30/2016    Procedure: COLONOSCOPY;  Surgeon: Anna Tomas MD;  Location: [facility] ENDO;  Service: Endoscopy;  Laterality: N/A;    COLONOSCOPY N/A 7/17/2019    Procedure: COLONOSCOPY;  Surgeon: David Carter III, MD;  Location: [facility] ENDO;  Service: Endoscopy;  Laterality: N/A;    COMPUTED TOMOGRAPHY N/A 9/9/2019    Procedure: CT (COMPUTED TOMOGRAPHY);  [facility];  Location: [facility] OR;  Service: General;  Laterality: N/A;  Microwave ablation to be done in CT.  Need CRNA and cart    CORONARY ARTERY BYPASS GRAFT (CABG) N/A 11/5/2018    Procedure: CORONARY ARTERY BYPASS GRAFT (CABG);  Surgeon: Bear De Luna MD;  Location: [facility] OR;  Service: Cardiothoracic;  Laterality: N/A;  TWO VESSEL BYPASS WITH AORTOTOMY AND EXCISION OF ATHEROSCLEROTIC PLAQUE    ELBOW BURSA SURGERY Right 2010    ENDOSCOPIC  HARVEST OF VEIN Left 11/5/2018    Procedure: SURGICAL PROCUREMENT, VEIN, ENDOSCOPIC;  Surgeon: Bear De Luna MD;  Location: [facility] OR;  Service: Cardiothoracic;  Laterality: Left;    ESOPHAGOGASTRODUODENOSCOPY N/A 7/17/2019    Procedure: ESOPHAGOGASTRODUODENOSCOPY (EGD);  Surgeon: David Carter III, MD;  Location: [facility] ENDO;  Service: Endoscopy;  Laterality: N/A;    ETHMOIDECTOMY Bilateral 3/27/2019    Procedure: ETHMOIDECTOMY;  Surgeon: Alejandro Parkinson MD;  Location: [facility] OR;  Service: ENT;  Laterality: Bilateral;    EYE SURGERY      FRONTAL SINUS OBLITERATION Bilateral 3/27/2019    Procedure: SINUSOTOMY, FRONTAL SINUS, OBLITERATIVE;  Surgeon: Alejandro Parkinson MD;  Location: [facility] OR;  Service: ENT;  Laterality: Bilateral;    FUNCTIONAL ENDOSCOPIC SINUS SURGERY (FESS) Bilateral 3/27/2019    Procedure: FESS (FUNCTIONAL ENDOSCOPIC SINUS SURGERY);  Surgeon: Alejandro Parkinson MD;  Location: [facility] OR;  Service: ENT;  Laterality: Bilateral;  Left Keila bullosa resection     KNEE ARTHROSCOPY W/ MENISCAL REPAIR Left 06/2019    dr boykin    KNEE SURGERY Right     MAXILLARY ANTROSTOMY Bilateral 3/27/2019    Procedure: MAXILLARY ANTROSTOMY;  Surgeon: Alejandro Parkinson MD;  Location: [facility] OR;  Service: ENT;  Laterality: Bilateral;    NASAL SEPTOPLASTY N/A 3/27/2019    Procedure: SEPTOPLASTY, NOSE;  Surgeon: Alejandro Parkinson MD;  Location: [facility] OR;  Service: ENT;  Laterality: N/A;    RECTAL SURGERY  2012    TRANSURETHRAL RESECTION OF PROSTATE (TURP) WITHOUT USE OF LASER N/A 6/19/2018    Procedure: TURP, WITHOUT USING LASER;  Surgeon: Cooper Cordova IV, MD;  Location: [facility] OR;  Service: Urology;  Laterality: N/A;    TRIGGER FINGER RELEASE Left        Review of patient's allergies indicates:   Allergen Reactions    Lipitor [atorvastatin] Other (See Comments)     Muscle aches and pains as well as fatigue.     Niacin preparations Other (See Comments)     Causes broken blood vessels and bruises    Iodinated contrast media Hives      Tachycardia    Omeprazole Hives and Itching    Prilosec [omeprazole magnesium] Hives and Itching       Current Facility-Administered Medications on File Prior to Encounter   Medication    [COMPLETED] enalaprilat injection 2.5 mg    lactated ringers infusion     Current Outpatient Medications on File Prior to Encounter   Medication Sig    budesonide-formoterol 160-4.5 mcg (SYMBICORT) 160-4.5 mcg/actuation HFAA INHALE 2 PUFFS INTO THE LUNGS TWO TIMES A DAY    furosemide (LASIX) 40 MG tablet Take 1 tablet (40 mg) by mouth 2 times a day and 1 extra if needed for 14 days.    HUMALOG KWIKPEN INSULIN 100 unit/mL pen INJECT 3-4 UNITS INTO THE SKIN THREE TIMES DAILY BEFORE MEALS.    insulin (LANTUS SOLOSTAR U-100 INSULIN) glargine 100 units/mL (3mL) SubQ pen Inject 40 Units into the skin once daily.    krill oil 500 mg Cap Take by mouth.    levocetirizine (XYZAL) 5 MG tablet TAKE ONE TABLET BY MOUTH EVERY EVENING    metoprolol succinate (TOPROL-XL) 50 MG 24 hr tablet TAKE 1 TABLET BY MOUTH TWO TIMES A DAY    milk thistle 175 mg tablet Take 175 mg by mouth once daily.    MV,CA,IRON,MIN/FA/PHYTOSTEROL ([facility]) Take 1 tablet by mouth once daily.     RABEprazole (ACIPHEX) 20 mg tablet Take 20 mg by mouth once daily.    SYNTHROID 137 mcg Tab tablet TAKE 2 TABLETS BY MOUTH BEFORE BREAKFAST.    ACCU-CHEK GRACE PLUS METER Misc AS DIRECTED    albuterol-ipratropium (DUO-NEB) 2.5 mg-0.5 mg/3 mL nebulizer solution Take 3 mLs by nebulization 2 (two) times daily. Rescue    azelastine (ASTELIN) 137 mcg (0.1 %) nasal spray 2 sprays (274 mcg total) by Nasal route 2 (two) times daily.    blood sugar diagnostic (ACCU-CHEK GRACE PLUS TEST STRP) Strp TEST THREE TIMES A DAY    diclofenac sodium (VOLTAREN) 1 % Gel Apply 2 g topically 4 (four) times daily.    docusate sodium (COLACE) 100 MG capsule Take 1 capsule (100 mg total) by mouth 2 (two) times daily.    finasteride (PROSCAR) 5 mg tablet Take 1  "tablet (5 mg total) by mouth once daily.    FLUAD 7210-0587, 65 YR UP,,PF, 45 mcg (15 mcg x 3)/0.5 mL Syrg     GLUCOSAMINE HCL/CHONDR ROSARIO A NA (OSTEO BI-FLEX ORAL) Take 1 tablet by mouth 2 (two) times daily.     HYDROcodone-acetaminophen (NORCO)  mg per tablet Take 1 tablet by mouth every 4 (four) hours as needed for Pain.    lancets (ACCU-CHEK FASTCLIX LANCET DRUM) Misc TEST TWO TIMES A DAY    lisinopril 10 MG tablet Take 1 tablet (10 mg total) by mouth once daily.    mometasone (NASONEX) 50 mcg/actuation nasal spray 2 sprays by Nasal route once daily.    montelukast (SINGULAIR) 10 mg tablet TAKE ONE TABLET BY MOUTH EVERY DAY    pen needle, diabetic (BD INSULIN PEN NEEDLE UF MINI) 31 gauge x 3/16" Ndle USE AS DIRECTED    polyethylene glycol (GLYCOLAX) 17 gram/dose powder Take 17 g ( 1 capful) by mouth once daily.    predniSONE (DELTASONE) 20 MG tablet Take 1 tablet (20 mg total) by mouth As instructed. 3tab po daily x3days,2tabs po daily x3days,1tab po daily x3days,1/2tab po daily x3days    sildenafil (REVATIO) 20 mg Tab Take 2 tables by mouth one hour prior to intercourse.  Max 2 per 24 hours    sildenafil (REVATIO) 20 mg Tab Take 2 tablets one hour prior to intercourse. Max two per 24 hours     Family History     Problem Relation (Age of Onset)    Heart disease Mother, Father, Brother        Tobacco Use    Smoking status: Former Smoker     Packs/day: 0.50     Years: 4.00     Pack years: 2.00     Last attempt to quit: 1972     Years since quittin.7    Smokeless tobacco: Former User     Types: Chew     Quit date: 1976   Substance and Sexual Activity    Alcohol use: Yes     Alcohol/week: 2.0 standard drinks     Types: 2 Cans of beer per week     Comment: daily  No alcohol prior to surgery    Drug use: No    Sexual activity: Yes     Partners: Female     Review of Systems   Constitution: Positive for malaise/fatigue.   HENT: Negative for hearing loss and hoarse voice.    Eyes: " Negative for blurred vision and visual disturbance.   Cardiovascular: Positive for chest pain. Negative for claudication, dyspnea on exertion, irregular heartbeat, leg swelling, near-syncope, orthopnea, palpitations, paroxysmal nocturnal dyspnea and syncope.   Respiratory: Negative for cough, hemoptysis, shortness of breath, sleep disturbances due to breathing, snoring and wheezing.    Endocrine: Negative for cold intolerance and heat intolerance.   Hematologic/Lymphatic: Bruises/bleeds easily.   Skin: Negative for color change, dry skin and nail changes.   Musculoskeletal: Positive for arthritis and back pain. Negative for joint pain and myalgias.   Gastrointestinal: Negative for bloating, abdominal pain, constipation, nausea and vomiting.   Genitourinary: Negative for dysuria, flank pain, hematuria and hesitancy.   Neurological: Negative for headaches, light-headedness, loss of balance, numbness, paresthesias and weakness.   Psychiatric/Behavioral: Negative for altered mental status.   Allergic/Immunologic: Negative for environmental allergies.     Objective:     Vital Signs (Most Recent):  Temp: 98.2 °F (36.8 °C) (03/02/20 0745)  Pulse: 72 (03/02/20 0745)  Resp: 18 (03/02/20 0745)  BP: (!) 146/65 (03/02/20 0745)  SpO2: 97 % (03/02/20 0745) Vital Signs (24h Range):  Temp:  [97.9 °F (36.6 °C)-98.9 °F (37.2 °C)] 98.2 °F (36.8 °C)  Pulse:  [63-87] 72  Resp:  [13-30] 18  SpO2:  [94 %-98 %] 97 %  BP: (111-213)/() 146/65     Weight: 111.2 kg (245 lb 2.4 oz)  Body mass index is 37.28 kg/m².    SpO2: 97 %  O2 Device (Oxygen Therapy): room air      Intake/Output Summary (Last 24 hours) at 3/2/2020 0804  Last data filed at 3/2/2020 0500  Gross per 24 hour   Intake 480 ml   Output --   Net 480 ml       Lines/Drains/Airways     Peripheral Intravenous Line                 Peripheral IV - Single Lumen 03/01/20 1841 20 G Right Antecubital less than 1 day                Physical Exam   Constitutional: He is oriented to  person, place, and time. He appears well-developed and well-nourished. No distress.   HENT:   Head: Normocephalic and atraumatic.   Eyes: Pupils are equal, round, and reactive to light.   Neck: Normal range of motion and full passive range of motion without pain. Neck supple. No JVD present.   Cardiovascular: Normal rate, regular rhythm, S1 normal, S2 normal and intact distal pulses. PMI is not displaced. Exam reveals no distant heart sounds.   No murmur heard.  Pulses:       Radial pulses are 2+ on the right side, and 2+ on the left side.        Dorsalis pedis pulses are 2+ on the right side, and 2+ on the left side.   CHEST PAIN FREE   Pulmonary/Chest: Effort normal and breath sounds normal. No accessory muscle usage. No respiratory distress. He has no decreased breath sounds. He has no wheezes. He has no rales.   Abdominal: Soft. Bowel sounds are normal. He exhibits no distension. There is no tenderness.   Musculoskeletal: Normal range of motion. He exhibits no edema.        Right ankle: He exhibits no swelling.        Left ankle: He exhibits no swelling.   Neurological: He is alert and oriented to person, place, and time.   Skin: Skin is warm and dry. He is not diaphoretic. No cyanosis. Nails show no clubbing.   Psychiatric: He has a normal mood and affect. His speech is normal and behavior is normal. Judgment and thought content normal. Cognition and memory are normal.   Nursing note and vitals reviewed.      Significant Labs:   BMP:   Recent Labs   Lab 03/01/20  0745 03/01/20 1841 03/02/20  0437   * 150* 139*    140 141   K 3.9 3.7 3.9    102 103   CO2 28 28 28   BUN 14 15 14   CREATININE 1.1 1.2 0.9   CALCIUM 9.3 9.3 8.8   , CBC   Recent Labs   Lab 03/01/20  0745 03/01/20  1841 03/02/20  0916   WBC 4.78 6.36 5.41   HGB 15.2 14.5 13.7*   HCT 44.1 41.5 40.3   * 145* 136*   , Lipid Panel No results for input(s): CHOL, HDL, LDLCALC, TRIG, CHOLHDL in the last 48 hours., Troponin   Recent  Labs   Lab 03/01/20  1841 03/02/20  0121 03/02/20  0704   TROPONINI 0.018 0.013 0.014    and All pertinent lab results from the last 24 hours have been reviewed.    Significant Imaging: Echocardiogram:   2D echo with color flow doppler:   Results for orders placed or performed in visit on 11/14/18   2D echo with color flow doppler   Result Value Ref Range    QEF 60 55 - 65    Est. PA Systolic Pressure 32.72     Tricuspid Valve Regurgitation MILD     Narrative    Date of Procedure: 12/13/2018        TEST DESCRIPTION   Technical Quality: This is a technically challenging study. This study was performed in conjunction with a 3ml intravenous injection of Optison contrast agent.     Aorta: The aortic root is normal in size, measuring 2.7 cm at sinotubular junction and 3.3 cm at Sinuses of Valsalva. The proximal ascending aorta is normal in size, measuring 3.4 cm across.     Left Atrium: The left atrial volume index is normal, measuring 31.79 cc/m2.     Left Ventricle: The left ventricle is normal in size, with an end-diastolic diameter of 4.5 cm, and an end-systolic diameter of 3.4 cm. LV wall thickness is normal, with the septum measuring 1.7 cm and the posterior wall measuring 1.3 cm across. Relative   wall thickness was increased at 0.58, and the LV mass index was increased at 147.5 g/m2 consistent with concentric left ventricular hypertrophy. There are no regional wall motion abnormalities. Left ventricular systolic function appears normal. Visually   estimated ejection fraction is 60-65%. The LV Doppler derived stroke volume equals 72.0 ccs.     Diastolic indices: E wave velocity 0.6 m/s, E/A ratio 1.0,  msec., E/e' ratio(avg) 8. Diastolic function is indeterminate.     Right Atrium: The right atrium is normal in size, measuring 5.2 cm in length and 3.5 cm in width in the apical view.     Right Ventricle: The right ventricle is normal in size measuring 2.7 cm at the base in the apical right ventricle-focused  view. Global right ventricular systolic function appears normal. The estimated PA systolic pressure is greater than 33 mmHg.     Aortic Valve:  The aortic valve is moderately sclerotic. The peak gradient obtained across the aortic valve is 8 mmHg.     Mitral Valve:  The mitral valve is mildly sclerotic.     Tricuspid Valve:  The tricuspid valve is normal in structure. There is mild tricuspid regurgitation.     Pulmonary Valve:  The pulmonic valve is not well seen.     IVC: The IVC is not visualized.     Intracavitary: There is no evidence of pericardial effusion, intracavity mass, thrombi, or vegetation.         CONCLUSIONS     1 - Concentric hypertrophy.     2 - No wall motion abnormalities.     3 - Normal left ventricular systolic function (EF 60-65%).     4 - Indeterminate LV diastolic function.     5 - Normal right ventricular systolic function .     6 - The estimated PA systolic pressure is greater than 33 mmHg.     7 - Mild tricuspid regurgitation.   Poor imaging quality.          This document has been electronically    SIGNED BY: Thomas Israel MD On: 12/13/2018 18:32    and X-Ray: CXR: X-Ray Chest 1 View (CXR): No results found for this visit on 03/01/20. and X-Ray Chest PA and Lateral (CXR): No results found for this visit on 03/01/20. and KUB: X-Ray Abdomen AP 1 View (KUB): No results found for this visit on 03/01/20.

## 2020-03-02 NOTE — INTERVAL H&P NOTE
The patient has been examined and the H&P has been reviewed:    I concur with the findings and no changes have occurred since H&P was written.    Anesthesia/Surgery risks, benefits and alternative options discussed and understood by patient/family.  I have explained the risks, benefits , and alternatives of the procedure in detail.the patient voices understanding and all questions have been answered.the patient agrees to proceed as planned.          Active Hospital Problems    Diagnosis  POA    *Unstable angina [I20.0]  Yes    CAD with remote CABG x 2V (LIMA-LAD and SVG-RCA) in 11/2018 [I25.10]  Yes     Chronic     40% lesion 2002;lazo      Essential hypertension [I10]  Yes     Chronic    Type 2 diabetes mellitus, with long-term current use of insulin [E11.9, Z79.4]  Not Applicable     Chronic      Resolved Hospital Problems   No resolved problems to display.

## 2020-03-02 NOTE — PLAN OF CARE
SW met with patient and family to assess for discharge planning.  Patient was alert and oriented.  Patient denied the use of any medical/respiratory assistive equipment and home health services before coming to the hospital.  Patient reported living at home alone, and identified his help at home as no one.  Patient stated that at this time, he does not feel like he will need any help.  Patient stated that he manages his own healthcare. Patient and family complained to SW about primary doctor lack of urgency with patient, along with the time it was taking for him to have his procedure completed.  Patient denied the need for any assistive equipment, home health services, and all community resources at this time.  Patient discharge needs are undetermined at this time.  SW provided a transitional care folder, information on advanced directives, information on pharmacy bedside delivery, and discharge planning begins on admission with contact information for any needs/questions.     D/C Plan:  Home  PCP:  Dr. Cortes Roblero  Preferred Pharmacy:    [facility] [place], [place] - [address] [facility] [address]  [address] [address] [address]  Phone: [phone] Fax: [phone]    Discharge transportation:  Family  [facility]:  Declined  Pharmacy Bedside Delivery:  Declined       03/02/20 1623   Discharge Assessment   Assessment Type Discharge Planning Assessment   Confirmed/corrected address and phone number on facesheet? Yes   Assessment information obtained from? Patient   Expected Length of Stay (days)   (TBD)   Communicated expected length of stay with patient/caregiver yes   Prior to hospitilization cognitive status: Alert/Oriented   Prior to hospitalization functional status: Independent   Current cognitive status: Alert/Oriented   Current Functional Status: Independent   Lives With alone   Able to Return to Prior Arrangements yes   Is patient able to care for self after discharge? Unable to determine at this  time (comments)   Who are your caregiver(s) and their phone number(s)? Henrry Pretty (son) [phone]   Patient's perception of discharge disposition home or selfcare   Readmission Within the Last 30 Days no previous admission in last 30 days   Patient currently being followed by outpatient case management? No   Patient currently receives any other outside agency services? No   Equipment Currently Used at Home none   Do you have any problems affording any of your prescribed medications? No   Is the patient taking medications as prescribed? yes   Does the patient have transportation home? No   Dialysis Name and Scheduled days N/A   Does the patient receive services at the [facility]? No   Discharge Plan A Home   DME Needed Upon Discharge  none   Patient/Family in Agreement with Plan yes

## 2020-03-02 NOTE — SUBJECTIVE & OBJECTIVE
Past Medical History:   Diagnosis Date    Aortic stenosis     Asthma     BPH (benign prostatic hyperplasia)     CAD (coronary artery disease)     40% lesion 2002;lazo    Cirrhosis     Claudication 4/9/2014    Colon polyp     COPD (chronic obstructive pulmonary disease)     ED (erectile dysfunction)     Encounter for blood transfusion     Ex-smoker     Hearing loss NEC     Hepatitis C     Cured;erji brown 2015    Hyperlipidemia     Hypertension     Hypothyroid     s/p tx graves    DELONTE on CPAP     Osteoarthritis     PVD (peripheral vascular disease)     Type 2 diabetes mellitus        Past Surgical History:   Procedure Laterality Date    CARDIAC CATHETERIZATION      CARDIAC SURGERY      CARPAL TUNNEL RELEASE Left     CATARACT EXTRACTION      CATARACT EXTRACTION W/ INTRAOCULAR LENS  IMPLANT, BILATERAL  2008    CATHETERIZATION OF BOTH LEFT AND RIGHT HEART N/A 11/2/2018    Procedure: CATHETERIZATION, HEART, BOTH LEFT AND RIGHT;  Surgeon: Frank Lazo MD;  Location: [facility] CATH LAB;  Service: Cardiology;  Laterality: N/A;    COLONOSCOPY  8/2013    COLONOSCOPY N/A 5/30/2016    Procedure: COLONOSCOPY;  Surgeon: Anna Tomas MD;  Location: [facility] ENDO;  Service: Endoscopy;  Laterality: N/A;    COLONOSCOPY N/A 7/17/2019    Procedure: COLONOSCOPY;  Surgeon: David Carter III, MD;  Location: [facility] ENDO;  Service: Endoscopy;  Laterality: N/A;    COMPUTED TOMOGRAPHY N/A 9/9/2019    Procedure: CT (COMPUTED TOMOGRAPHY);  [facility];  Location: [facility] OR;  Service: General;  Laterality: N/A;  Microwave ablation to be done in CT.  Need CRNA and cart    CORONARY ARTERY BYPASS GRAFT (CABG) N/A 11/5/2018    Procedure: CORONARY ARTERY BYPASS GRAFT (CABG);  Surgeon: Bear De Luna MD;  Location: [facility] OR;  Service: Cardiothoracic;  Laterality: N/A;  TWO VESSEL BYPASS WITH AORTOTOMY AND EXCISION OF ATHEROSCLEROTIC PLAQUE    ELBOW BURSA SURGERY Right 2010    ENDOSCOPIC  HARVEST OF VEIN Left 11/5/2018    Procedure: SURGICAL PROCUREMENT, VEIN, ENDOSCOPIC;  Surgeon: Bear De Luna MD;  Location: [facility] OR;  Service: Cardiothoracic;  Laterality: Left;    ESOPHAGOGASTRODUODENOSCOPY N/A 7/17/2019    Procedure: ESOPHAGOGASTRODUODENOSCOPY (EGD);  Surgeon: David Carter III, MD;  Location: [facility] ENDO;  Service: Endoscopy;  Laterality: N/A;    ETHMOIDECTOMY Bilateral 3/27/2019    Procedure: ETHMOIDECTOMY;  Surgeon: Alejandro Parkinson MD;  Location: [facility] OR;  Service: ENT;  Laterality: Bilateral;    EYE SURGERY      FRONTAL SINUS OBLITERATION Bilateral 3/27/2019    Procedure: SINUSOTOMY, FRONTAL SINUS, OBLITERATIVE;  Surgeon: Alejandro Parkinson MD;  Location: [facility] OR;  Service: ENT;  Laterality: Bilateral;    FUNCTIONAL ENDOSCOPIC SINUS SURGERY (FESS) Bilateral 3/27/2019    Procedure: FESS (FUNCTIONAL ENDOSCOPIC SINUS SURGERY);  Surgeon: Alejandro Parkinson MD;  Location: [facility] OR;  Service: ENT;  Laterality: Bilateral;  Left Keila bullosa resection     KNEE ARTHROSCOPY W/ MENISCAL REPAIR Left 06/2019    dr boykin    KNEE SURGERY Right     MAXILLARY ANTROSTOMY Bilateral 3/27/2019    Procedure: MAXILLARY ANTROSTOMY;  Surgeon: Alejandro Parkinson MD;  Location: [facility] OR;  Service: ENT;  Laterality: Bilateral;    NASAL SEPTOPLASTY N/A 3/27/2019    Procedure: SEPTOPLASTY, NOSE;  Surgeon: Alejandro Parkinson MD;  Location: [facility] OR;  Service: ENT;  Laterality: N/A;    RECTAL SURGERY  2012    TRANSURETHRAL RESECTION OF PROSTATE (TURP) WITHOUT USE OF LASER N/A 6/19/2018    Procedure: TURP, WITHOUT USING LASER;  Surgeon: Cooper Cordova IV, MD;  Location: [facility] OR;  Service: Urology;  Laterality: N/A;    TRIGGER FINGER RELEASE Left        Review of patient's allergies indicates:   Allergen Reactions    Lipitor [atorvastatin] Other (See Comments)     Muscle aches and pains as well as fatigue.     Niacin preparations Other (See Comments)     Causes broken blood vessels and bruises    Iodinated contrast media Hives      Tachycardia    Omeprazole Hives and Itching    Prilosec [omeprazole magnesium] Hives and Itching       Current Facility-Administered Medications on File Prior to Encounter   Medication    [COMPLETED] aspirin chewable tablet 324 mg    [COMPLETED] enalaprilat injection 2.5 mg    [COMPLETED] labetalol injection 20 mg    lactated ringers infusion    [COMPLETED] nitroGLYCERIN 2% TD oint ointment 1 inch     Current Outpatient Medications on File Prior to Encounter   Medication Sig    budesonide-formoterol 160-4.5 mcg (SYMBICORT) 160-4.5 mcg/actuation HFAA INHALE 2 PUFFS INTO THE LUNGS TWO TIMES A DAY    furosemide (LASIX) 40 MG tablet Take 1 tablet (40 mg) by mouth 2 times a day and 1 extra if needed for 14 days.    HUMALOG KWIKPEN INSULIN 100 unit/mL pen INJECT 3-4 UNITS INTO THE SKIN THREE TIMES DAILY BEFORE MEALS.    insulin (LANTUS SOLOSTAR U-100 INSULIN) glargine 100 units/mL (3mL) SubQ pen Inject 40 Units into the skin once daily.    krill oil 500 mg Cap Take by mouth.    levocetirizine (XYZAL) 5 MG tablet TAKE ONE TABLET BY MOUTH EVERY EVENING    metoprolol succinate (TOPROL-XL) 50 MG 24 hr tablet TAKE 1 TABLET BY MOUTH TWO TIMES A DAY    milk thistle 175 mg tablet Take 175 mg by mouth once daily.    MV,CA,IRON,MIN/FA/PHYTOSTEROL (C[facility]TRUM [facility] HEART ORAL) Take 1 tablet by mouth once daily.     RABEprazole (ACIPHEX) 20 mg tablet Take 20 mg by mouth once daily.    SYNTHROID 137 mcg Tab tablet TAKE 2 TABLETS BY MOUTH BEFORE BREAKFAST.    ACCU-CHEK GRACE PLUS METER Misc AS DIRECTED    albuterol-ipratropium (DUO-NEB) 2.5 mg-0.5 mg/3 mL nebulizer solution Take 3 mLs by nebulization 2 (two) times daily. Rescue    azelastine (ASTELIN) 137 mcg (0.1 %) nasal spray 2 sprays (274 mcg total) by Nasal route 2 (two) times daily.    blood sugar diagnostic (ACCU-CHEK GRACE PLUS TEST STRP) Strp TEST THREE TIMES A DAY    diclofenac sodium (VOLTAREN) 1 % Gel Apply 2 g topically 4 (four) times daily.     "docusate sodium (COLACE) 100 MG capsule Take 1 capsule (100 mg total) by mouth 2 (two) times daily.    finasteride (PROSCAR) 5 mg tablet Take 1 tablet (5 mg total) by mouth once daily.    FLUAD [id number], 65 YR UP,,PF, 45 mcg (15 mcg x 3)/0.5 mL Syrg     GLUCOSAMINE HCL/CHONDR ROSARIO A NA (OSTEO BI-FLEX ORAL) Take 1 tablet by mouth 2 (two) times daily.     HYDROcodone-acetaminophen (NORCO)  mg per tablet Take 1 tablet by mouth every 4 (four) hours as needed for Pain.    lancets (ACCU-CHEK FASTCLIX LANCET DRUM) Misc TEST TWO TIMES A DAY    lisinopril 10 MG tablet Take 1 tablet (10 mg total) by mouth once daily.    mometasone (NASONEX) 50 mcg/actuation nasal spray 2 sprays by Nasal route once daily.    montelukast (SINGULAIR) 10 mg tablet TAKE ONE TABLET BY MOUTH EVERY DAY    pen needle, diabetic (BD INSULIN PEN NEEDLE UF MINI) 31 gauge x 3/16" Ndle USE AS DIRECTED    polyethylene glycol (GLYCOLAX) 17 gram/dose powder Take 17 g ( 1 capful) by mouth once daily.    predniSONE (DELTASONE) 20 MG tablet Take 1 tablet (20 mg total) by mouth As instructed. 3tab po daily x3days,2tabs po daily x3days,1tab po daily x3days,1/2tab po daily x3days    sildenafil (REVATIO) 20 mg Tab Take 2 tables by mouth one hour prior to intercourse.  Max 2 per 24 hours    sildenafil (REVATIO) 20 mg Tab Take 2 tablets one hour prior to intercourse. Max two per 24 hours     Family History     Problem Relation (Age of Onset)    Heart disease Mother, Father, Brother        Tobacco Use    Smoking status: Former Smoker     Packs/day: 0.50     Years: 4.00     Pack years: 2.00     Last attempt to quit: 1972     Years since quittin.7    Smokeless tobacco: Former User     Types: Chew     Quit date: 1976   Substance and Sexual Activity    Alcohol use: Yes     Alcohol/week: 2.0 standard drinks     Types: 2 Cans of beer per week     Comment: daily  No alcohol prior to surgery    Drug use: No    Sexual activity: Yes     " Partners: Female     Review of Systems   Constitutional: Negative for chills, diaphoresis, fatigue and fever.   HENT: Negative for congestion, postnasal drip, rhinorrhea, sinus pressure and sore throat.    Respiratory: Negative for cough, shortness of breath and wheezing.    Cardiovascular: Positive for chest pain. Negative for palpitations and leg swelling.   Gastrointestinal: Negative for abdominal pain, nausea and vomiting.   Musculoskeletal: Negative for arthralgias, back pain and myalgias.   Neurological: Negative for dizziness, weakness, light-headedness, numbness and headaches.   Psychiatric/Behavioral: The patient is not nervous/anxious.    All other systems reviewed and are negative.    Objective:     Vital Signs (Most Recent):  Temp: 98.9 °F (37.2 °C) (03/01/20 1813)  Pulse: 65 (03/01/20 2002)  Resp: 19 (03/01/20 2002)  BP: (!) 143/81 (03/01/20 2002)  SpO2: 97 % (03/01/20 2002) Vital Signs (24h Range):  Temp:  [98.5 °F (36.9 °C)-98.9 °F (37.2 °C)] 98.9 °F (37.2 °C)  Pulse:  [65-76] 65  Resp:  [13-30] 19  SpO2:  [95 %-98 %] 97 %  BP: (118-221)/() 143/81     Weight: 112.1 kg (247 lb 3.9 oz)  Body mass index is 37.59 kg/m².    Physical Exam   Constitutional: He is oriented to person, place, and time. He appears well-developed and well-nourished. No distress.   HENT:   Head: Normocephalic and atraumatic.   Eyes: Conjunctivae are normal.   PERRL; EOM intact.   Neck: Normal range of motion. Neck supple.   Cardiovascular: Normal rate, regular rhythm, S1 normal and S2 normal.  No extrasystoles are present. Exam reveals no gallop.   No murmur heard.  Pulmonary/Chest: Effort normal and breath sounds normal. No accessory muscle usage. No tachypnea. No respiratory distress. He has no wheezes. He has no rhonchi. He has no rales. He exhibits no tenderness.   Abdominal: Soft. Bowel sounds are normal. He exhibits no distension. There is no tenderness. There is no rebound, no guarding and no CVA tenderness.    Musculoskeletal: Normal range of motion. He exhibits no edema, tenderness or deformity.   Neurological: He is alert and oriented to person, place, and time.   Skin: Skin is warm, dry and intact. No rash noted. He is not diaphoretic. No cyanosis or erythema.   Psychiatric: He has a normal mood and affect. His behavior is normal.   Nursing note and vitals reviewed.          Significant Labs:  Results for orders placed or performed during the hospital encounter of 03/01/20   CBC auto differential   Result Value Ref Range    WBC 6.36 3.90 - 12.70 K/uL    RBC 4.46 (L) 4.60 - 6.20 M/uL    Hemoglobin 14.5 14.0 - 18.0 g/dL    Hematocrit 41.5 40.0 - 54.0 %    Mean Corpuscular Volume 93 82 - 98 fL    Mean Corpuscular Hemoglobin 32.5 (H) 27.0 - 31.0 pg    Mean Corpuscular Hemoglobin Conc 34.9 32.0 - 36.0 g/dL    RDW 12.5 11.5 - 14.5 %    Platelets 145 (L) 150 - 350 K/uL    MPV 10.0 9.2 - 12.9 fL    Immature Granulocytes 0.2 0.0 - 0.5 %    Gran # (ANC) 3.5 1.8 - 7.7 K/uL    Immature Grans (Abs) 0.01 0.00 - 0.04 K/uL    Lymph # 2.0 1.0 - 4.8 K/uL    Mono # 0.7 0.3 - 1.0 K/uL    Eos # 0.1 0.0 - 0.5 K/uL    Baso # 0.05 0.00 - 0.20 K/uL    nRBC 0 0 /100 WBC    Gran% 54.7 38.0 - 73.0 %    Lymph% 31.0 18.0 - 48.0 %    Mono% 11.3 4.0 - 15.0 %    Eosinophil% 2.0 0.0 - 8.0 %    Basophil% 0.8 0.0 - 1.9 %    Differential Method Automated    Comprehensive metabolic panel   Result Value Ref Range    Sodium 140 136 - 145 mmol/L    Potassium 3.7 3.5 - 5.1 mmol/L    Chloride 102 95 - 110 mmol/L    CO2 28 23 - 29 mmol/L    Glucose 150 (H) 70 - 110 mg/dL    BUN, Bld 15 8 - 23 mg/dL    Creatinine 1.2 0.5 - 1.4 mg/dL    Calcium 9.3 8.7 - 10.5 mg/dL    Total Protein 7.8 6.0 - 8.4 g/dL    Albumin 3.9 3.5 - 5.2 g/dL    Total Bilirubin 0.4 0.1 - 1.0 mg/dL    Alkaline Phosphatase 70 55 - 135 U/L    AST 26 10 - 40 U/L    ALT 32 10 - 44 U/L    Anion Gap 10 8 - 16 mmol/L    eGFR if African American >60 >60 mL/min/1.73 m^2    eGFR if non African American  60 >60 mL/min/1.73 m^2   Troponin I #1   Result Value Ref Range    Troponin I 0.018 0.000 - 0.026 ng/mL     *Note: Due to a large number of results and/or encounters for the requested time period, some results have not been displayed. A complete set of results can be found in Results Review.      All pertinent labs within the past 24 hours have been reviewed.    Significant Imaging:  Imaging Results    None        I have reviewed all pertinent imaging results/findings within the past 24 hours.     EKG: (personally reviewed)  Sinus rhythm with sinus arrhythmia and 1st degree AV block, nonspecific T wave abnormality. No acute ischemic ST-T changes from previous tracings.

## 2020-03-02 NOTE — H&P (VIEW-ONLY)
[facility] -   Cardiology  Consult Note    Patient Name: Erendira Pretty  MRN: [id number]  Admission Date: 3/1/2020  Hospital Length of Stay: 0 days  Code Status: Full Code   Attending Provider: Agnieszka Keating MD   Consulting Provider: LIDA Porter  Primary Care Physician: Cortes Roblero MD  Principal Problem:Unstable angina    Patient information was obtained from patient, relative(s), past medical records and ER records.     Inpatient consult to Cardiology  Consult performed by: LIDA Palacios  Consult ordered by: Cynthia Reeves NP        Subjective:     Chief Complaint:  Chest pain     HPI:   Erendira Pretty is a 74 year old male who presented to [facility] due to recurrent mid-sternal chest pain for the past 2 weeks. He was discharged from this ED yesterday and developed recurrent chest pain and returned to ED. His current medical conditions include CAD s/p CABG x2V (LIMA-LAD, RSVG-OM2) in 2018, HTN, HLP, DM Type II, COPD, DELONTE. ED workup revealed troponin negative x 2, EKG unrevealing. He was placed in observation under the care of hospital medicine. Cardiology consulted to assist with medical management. Chart reviewed, patient seen and examined. Denies chest pain on exam today. Will start IV heparin gtt for unstable angina and IVF's for pre-procedure hydration today. Keep NPO for now, ECHO pending.  Plan for [facility] today for definitive evaluation. Further recs to follow.     Past Medical History:   Diagnosis Date    Aortic stenosis     Asthma     BPH (benign prostatic hyperplasia)     CAD (coronary artery disease)     40% lesion 2002;lazo    Cirrhosis     Claudication 4/9/2014    Colon polyp     COPD (chronic obstructive pulmonary disease)     ED (erectile dysfunction)     Encounter for blood transfusion     Ex-smoker     Hearing loss NEC     Hepatitis C     Cured;reji brown 2015    Hyperlipidemia     Hypertension     Hypothyroid     s/p tx graves    DELONTE on CPAP      Osteoarthritis     PVD (peripheral vascular disease)     Type 2 diabetes mellitus        Past Surgical History:   Procedure Laterality Date    CARDIAC CATHETERIZATION      CARDIAC SURGERY      CARPAL TUNNEL RELEASE Left     CATARACT EXTRACTION      CATARACT EXTRACTION W/ INTRAOCULAR LENS  IMPLANT, BILATERAL  2008    CATHETERIZATION OF BOTH LEFT AND RIGHT HEART N/A 11/2/2018    Procedure: CATHETERIZATION, HEART, BOTH LEFT AND RIGHT;  Surgeon: Frank Gallardo MD;  Location: [facility] CATH LAB;  Service: Cardiology;  Laterality: N/A;    COLONOSCOPY  8/2013    COLONOSCOPY N/A 5/30/2016    Procedure: COLONOSCOPY;  Surgeon: Anna Tomas MD;  Location: [facility] ENDO;  Service: Endoscopy;  Laterality: N/A;    COLONOSCOPY N/A 7/17/2019    Procedure: COLONOSCOPY;  Surgeon: David Carter III, MD;  Location: [facility] ENDO;  Service: Endoscopy;  Laterality: N/A;    COMPUTED TOMOGRAPHY N/A 9/9/2019    Procedure: CT (COMPUTED TOMOGRAPHY);  Surgeon: Sri Diagnostic Provider;  Location: [facility] OR;  Service: General;  Laterality: N/A;  Microwave ablation to be done in CT.  Need CRNA and cart    CORONARY ARTERY BYPASS GRAFT (CABG) N/A 11/5/2018    Procedure: CORONARY ARTERY BYPASS GRAFT (CABG);  Surgeon: Bear De Luna MD;  Location: [facility] OR;  Service: Cardiothoracic;  Laterality: N/A;  TWO VESSEL BYPASS WITH AORTOTOMY AND EXCISION OF ATHEROSCLEROTIC PLAQUE    ELBOW BURSA SURGERY Right 2010    ENDOSCOPIC HARVEST OF VEIN Left 11/5/2018    Procedure: SURGICAL PROCUREMENT, VEIN, ENDOSCOPIC;  Surgeon: Bear De Luna MD;  Location: [facility] OR;  Service: Cardiothoracic;  Laterality: Left;    ESOPHAGOGASTRODUODENOSCOPY N/A 7/17/2019    Procedure: ESOPHAGOGASTRODUODENOSCOPY (EGD);  Surgeon: David Carter III, MD;  Location: [facility] ENDO;  Service: Endoscopy;  Laterality: N/A;    ETHMOIDECTOMY Bilateral 3/27/2019    Procedure: ETHMOIDECTOMY;  Surgeon: Alejandro Parkinson MD;  Location: [facility] OR;  Service: ENT;  Laterality: Bilateral;     EYE SURGERY      FRONTAL SINUS OBLITERATION Bilateral 3/27/2019    Procedure: SINUSOTOMY, FRONTAL SINUS, OBLITERATIVE;  Surgeon: Alejandro Parkinson MD;  Location: [facility] OR;  Service: ENT;  Laterality: Bilateral;    FUNCTIONAL ENDOSCOPIC SINUS SURGERY (FESS) Bilateral 3/27/2019    Procedure: FESS (FUNCTIONAL ENDOSCOPIC SINUS SURGERY);  Surgeon: Alejandro Parkinson MD;  Location: [facility] OR;  Service: ENT;  Laterality: Bilateral;  Left Keila bullosa resection     KNEE ARTHROSCOPY W/ MENISCAL REPAIR Left 06/2019    dr boykin    KNEE SURGERY Right     MAXILLARY ANTROSTOMY Bilateral 3/27/2019    Procedure: MAXILLARY ANTROSTOMY;  Surgeon: Alejandro Parkinson MD;  Location: [facility] OR;  Service: ENT;  Laterality: Bilateral;    NASAL SEPTOPLASTY N/A 3/27/2019    Procedure: SEPTOPLASTY, NOSE;  Surgeon: Alejandro Parkinson MD;  Location: [facility] OR;  Service: ENT;  Laterality: N/A;    RECTAL SURGERY  2012    TRANSURETHRAL RESECTION OF PROSTATE (TURP) WITHOUT USE OF LASER N/A 6/19/2018    Procedure: TURP, WITHOUT USING LASER;  Surgeon: Cooper Cordova IV, MD;  Location: [facility] OR;  Service: Urology;  Laterality: N/A;    TRIGGER FINGER RELEASE Left        Review of patient's allergies indicates:   Allergen Reactions    Lipitor [atorvastatin] Other (See Comments)     Muscle aches and pains as well as fatigue.     Niacin preparations Other (See Comments)     Causes broken blood vessels and bruises    Iodinated contrast media Hives     Tachycardia    Omeprazole Hives and Itching    Prilosec [omeprazole magnesium] Hives and Itching       Current Facility-Administered Medications on File Prior to Encounter   Medication    [COMPLETED] enalaprilat injection 2.5 mg    lactated ringers infusion     Current Outpatient Medications on File Prior to Encounter   Medication Sig    budesonide-formoterol 160-4.5 mcg (SYMBICORT) 160-4.5 mcg/actuation HFAA INHALE 2 PUFFS INTO THE LUNGS TWO TIMES A DAY    furosemide (LASIX) 40 MG tablet Take 1 tablet (40 mg) by mouth 2  times a day and 1 extra if needed for 14 days.    HUMALOG KWIKPEN INSULIN 100 unit/mL pen INJECT 3-4 UNITS INTO THE SKIN THREE TIMES DAILY BEFORE MEALS.    insulin (LANTUS SOLOSTAR U-100 INSULIN) glargine 100 units/mL (3mL) SubQ pen Inject 40 Units into the skin once daily.    krill oil 500 mg Cap Take by mouth.    levocetirizine (XYZAL) 5 MG tablet TAKE ONE TABLET BY MOUTH EVERY EVENING    metoprolol succinate (TOPROL-XL) 50 MG 24 hr tablet TAKE 1 TABLET BY MOUTH TWO TIMES A DAY    milk thistle 175 mg tablet Take 175 mg by mouth once daily.    MV,CA,IRON,MIN/FA/PHYTOSTEROL (CENTRUM SPECIALIST HEART ORAL) Take 1 tablet by mouth once daily.     RABEprazole (ACIPHEX) 20 mg tablet Take 20 mg by mouth once daily.    SYNTHROID 137 mcg Tab tablet TAKE 2 TABLETS BY MOUTH BEFORE BREAKFAST.    ACCU-CHEK GRACE PLUS METER Misc AS DIRECTED    albuterol-ipratropium (DUO-NEB) 2.5 mg-0.5 mg/3 mL nebulizer solution Take 3 mLs by nebulization 2 (two) times daily. Rescue    azelastine (ASTELIN) 137 mcg (0.1 %) nasal spray 2 sprays (274 mcg total) by Nasal route 2 (two) times daily.    blood sugar diagnostic (ACCU-CHEK GRACE PLUS TEST STRP) Strp TEST THREE TIMES A DAY    diclofenac sodium (VOLTAREN) 1 % Gel Apply 2 g topically 4 (four) times daily.    docusate sodium (COLACE) 100 MG capsule Take 1 capsule (100 mg total) by mouth 2 (two) times daily.    finasteride (PROSCAR) 5 mg tablet Take 1 tablet (5 mg total) by mouth once daily.    FLUAD [id number], 65 YR UP,,PF, 45 mcg (15 mcg x 3)/0.5 mL Syrg     GLUCOSAMINE HCL/CHONDR ROSARIO A NA (OSTEO BI-FLEX ORAL) Take 1 tablet by mouth 2 (two) times daily.     HYDROcodone-acetaminophen (NORCO)  mg per tablet Take 1 tablet by mouth every 4 (four) hours as needed for Pain.    lancets (ACCU-CHEK FASTCLIX LANCET DRUM) Misc TEST TWO TIMES A DAY    lisinopril 10 MG tablet Take 1 tablet (10 mg total) by mouth once daily.    mometasone (NASONEX) 50 mcg/actuation nasal spray 2  "sprays by Nasal route once daily.    montelukast (SINGULAIR) 10 mg tablet TAKE ONE TABLET BY MOUTH EVERY DAY    pen needle, diabetic (BD INSULIN PEN NEEDLE UF MINI) 31 gauge x 3/16" Ndle USE AS DIRECTED    polyethylene glycol (GLYCOLAX) 17 gram/dose powder Take 17 g ( 1 capful) by mouth once daily.    predniSONE (DELTASONE) 20 MG tablet Take 1 tablet (20 mg total) by mouth As instructed. 3tab po daily x3days,2tabs po daily x3days,1tab po daily x3days,1/2tab po daily x3days    sildenafil (REVATIO) 20 mg Tab Take 2 tables by mouth one hour prior to intercourse.  Max 2 per 24 hours    sildenafil (REVATIO) 20 mg Tab Take 2 tablets one hour prior to intercourse. Max two per 24 hours     Family History     Problem Relation (Age of Onset)    Heart disease Mother, Father, Brother        Tobacco Use    Smoking status: Former Smoker     Packs/day: 0.50     Years: 4.00     Pack years: 2.00     Last attempt to quit: 1972     Years since quittin.7    Smokeless tobacco: Former User     Types: Chew     Quit date: 1976   Substance and Sexual Activity    Alcohol use: Yes     Alcohol/week: 2.0 standard drinks     Types: 2 Cans of beer per week     Comment: daily  No alcohol prior to surgery    Drug use: No    Sexual activity: Yes     Partners: Female     Review of Systems   Constitution: Positive for malaise/fatigue.   HENT: Negative for hearing loss and hoarse voice.    Eyes: Negative for blurred vision and visual disturbance.   Cardiovascular: Positive for chest pain. Negative for claudication, dyspnea on exertion, irregular heartbeat, leg swelling, near-syncope, orthopnea, palpitations, paroxysmal nocturnal dyspnea and syncope.   Respiratory: Negative for cough, hemoptysis, shortness of breath, sleep disturbances due to breathing, snoring and wheezing.    Endocrine: Negative for cold intolerance and heat intolerance.   Hematologic/Lymphatic: Bruises/bleeds easily.   Skin: Negative for color change, dry " skin and nail changes.   Musculoskeletal: Positive for arthritis and back pain. Negative for joint pain and myalgias.   Gastrointestinal: Negative for bloating, abdominal pain, constipation, nausea and vomiting.   Genitourinary: Negative for dysuria, flank pain, hematuria and hesitancy.   Neurological: Negative for headaches, light-headedness, loss of balance, numbness, paresthesias and weakness.   Psychiatric/Behavioral: Negative for altered mental status.   Allergic/Immunologic: Negative for environmental allergies.     Objective:     Vital Signs (Most Recent):  Temp: 98.2 °F (36.8 °C) (03/02/20 0745)  Pulse: 72 (03/02/20 0745)  Resp: 18 (03/02/20 0745)  BP: (!) 146/65 (03/02/20 0745)  SpO2: 97 % (03/02/20 0745) Vital Signs (24h Range):  Temp:  [97.9 °F (36.6 °C)-98.9 °F (37.2 °C)] 98.2 °F (36.8 °C)  Pulse:  [63-87] 72  Resp:  [13-30] 18  SpO2:  [94 %-98 %] 97 %  BP: (111-213)/() 146/65     Weight: 111.2 kg (245 lb 2.4 oz)  Body mass index is 37.28 kg/m².    SpO2: 97 %  O2 Device (Oxygen Therapy): room air      Intake/Output Summary (Last 24 hours) at 3/2/2020 0804  Last data filed at 3/2/2020 0500  Gross per 24 hour   Intake 480 ml   Output --   Net 480 ml       Lines/Drains/Airways     Peripheral Intravenous Line                 Peripheral IV - Single Lumen 03/01/20 1841 20 G Right Antecubital less than 1 day                Physical Exam   Constitutional: He is oriented to person, place, and time. He appears well-developed and well-nourished. No distress.   HENT:   Head: Normocephalic and atraumatic.   Eyes: Pupils are equal, round, and reactive to light.   Neck: Normal range of motion and full passive range of motion without pain. Neck supple. No JVD present.   Cardiovascular: Normal rate, regular rhythm, S1 normal, S2 normal and intact distal pulses. PMI is not displaced. Exam reveals no distant heart sounds.   No murmur heard.  Pulses:       Radial pulses are 2+ on the right side, and 2+ on the left  side.        Dorsalis pedis pulses are 2+ on the right side, and 2+ on the left side.   CHEST PAIN FREE   Pulmonary/Chest: Effort normal and breath sounds normal. No accessory muscle usage. No respiratory distress. He has no decreased breath sounds. He has no wheezes. He has no rales.   Abdominal: Soft. Bowel sounds are normal. He exhibits no distension. There is no tenderness.   Musculoskeletal: Normal range of motion. He exhibits no edema.        Right ankle: He exhibits no swelling.        Left ankle: He exhibits no swelling.   Neurological: He is alert and oriented to person, place, and time.   Skin: Skin is warm and dry. He is not diaphoretic. No cyanosis. Nails show no clubbing.   Psychiatric: He has a normal mood and affect. His speech is normal and behavior is normal. Judgment and thought content normal. Cognition and memory are normal.   Nursing note and vitals reviewed.      Significant Labs:   BMP:   Recent Labs   Lab 03/01/20  0745 03/01/20 1841 03/02/20  0437   * 150* 139*    140 141   K 3.9 3.7 3.9    102 103   CO2 28 28 28   BUN 14 15 14   CREATININE 1.1 1.2 0.9   CALCIUM 9.3 9.3 8.8   , CBC   Recent Labs   Lab 03/01/20  0745 03/01/20 1841 03/02/20  0916   WBC 4.78 6.36 5.41   HGB 15.2 14.5 13.7*   HCT 44.1 41.5 40.3   * 145* 136*   , Lipid Panel No results for input(s): CHOL, HDL, LDLCALC, TRIG, CHOLHDL in the last 48 hours., Troponin   Recent Labs   Lab 03/01/20  1841 03/02/20  0121 03/02/20  0704   TROPONINI 0.018 0.013 0.014    and All pertinent lab results from the last 24 hours have been reviewed.    Significant Imaging: Echocardiogram:   2D echo with color flow doppler:   Results for orders placed or performed in visit on 11/14/18   2D echo with color flow doppler   Result Value Ref Range    QEF 60 55 - 65    Est. PA Systolic Pressure 32.72     Tricuspid Valve Regurgitation MILD     Narrative    Date of Procedure: 12/13/2018        TEST DESCRIPTION   Technical  Quality: This is a technically challenging study. This study was performed in conjunction with a 3ml intravenous injection of Optison contrast agent.     Aorta: The aortic root is normal in size, measuring 2.7 cm at sinotubular junction and 3.3 cm at Sinuses of Valsalva. The proximal ascending aorta is normal in size, measuring 3.4 cm across.     Left Atrium: The left atrial volume index is normal, measuring 31.79 cc/m2.     Left Ventricle: The left ventricle is normal in size, with an end-diastolic diameter of 4.5 cm, and an end-systolic diameter of 3.4 cm. LV wall thickness is normal, with the septum measuring 1.7 cm and the posterior wall measuring 1.3 cm across. Relative   wall thickness was increased at 0.58, and the LV mass index was increased at 147.5 g/m2 consistent with concentric left ventricular hypertrophy. There are no regional wall motion abnormalities. Left ventricular systolic function appears normal. Visually   estimated ejection fraction is 60-65%. The LV Doppler derived stroke volume equals 72.0 ccs.     Diastolic indices: E wave velocity 0.6 m/s, E/A ratio 1.0,  msec., E/e' ratio(avg) 8. Diastolic function is indeterminate.     Right Atrium: The right atrium is normal in size, measuring 5.2 cm in length and 3.5 cm in width in the apical view.     Right Ventricle: The right ventricle is normal in size measuring 2.7 cm at the base in the apical right ventricle-focused view. Global right ventricular systolic function appears normal. The estimated PA systolic pressure is greater than 33 mmHg.     Aortic Valve:  The aortic valve is moderately sclerotic. The peak gradient obtained across the aortic valve is 8 mmHg.     Mitral Valve:  The mitral valve is mildly sclerotic.     Tricuspid Valve:  The tricuspid valve is normal in structure. There is mild tricuspid regurgitation.     Pulmonary Valve:  The pulmonic valve is not well seen.     IVC: The IVC is not visualized.     Intracavitary: There is  no evidence of pericardial effusion, intracavity mass, thrombi, or vegetation.         CONCLUSIONS     1 - Concentric hypertrophy.     2 - No wall motion abnormalities.     3 - Normal left ventricular systolic function (EF 60-65%).     4 - Indeterminate LV diastolic function.     5 - Normal right ventricular systolic function .     6 - The estimated PA systolic pressure is greater than 33 mmHg.     7 - Mild tricuspid regurgitation.   Poor imaging quality.          This document has been electronically    SIGNED BY: Thomas Israel MD On: 12/13/2018 18:32    and X-Ray: CXR: X-Ray Chest 1 View (CXR): No results found for this visit on 03/01/20. and X-Ray Chest PA and Lateral (CXR): No results found for this visit on 03/01/20. and KUB: X-Ray Abdomen AP 1 View (KUB): No results found for this visit on 03/01/20.    Assessment and Plan:     * Unstable angina  Troponin negative  Worsening angina intensity and duration over past month since his wife passed away 1 month ago  Start IV heparin gtt   Start IVF's for pre-procedure hydration  Allergy to statins, will need to add Repatha/Praluent as OP  Keep NPO for now  Continue ASA, BB, ACEi, NTP  ECHO pending  Plans for [facility] today for definitive evaluation  Risks, benefits and treatment alternatives reviewed with patient. Patient has agreed to proceed, consent obtained and placed on chart.  Further recs to follow.     CAD with remote CABG x 2V (LIMA-LAD and SVG-RCA) in 11/2018  Chest pain free on exam today  S/P CABG x 2 V in 2018  Chest tightness x 1 month worsening in duration  Continue ASA, BB, ACEi, NTP  Start IV Heparin gtt for unstable anginal  Start IVFs for hydration pre-procedure  ECHO pending      Essential hypertension  On medical therapy  Continue BB, ACEi    Type 2 diabetes mellitus, with long-term current use of insulin  -MGMT PER PRIMARY TEAM        VTE Risk Mitigation (From admission, onward)         Ordered     heparin 25,000 units in dextrose 5% 250 mL (100  units/mL) infusion LOW INTENSITY nomogram - [facility]  Continuous     Question:  Heparin Infusion Adjustment (DO NOT MODIFY ANSWER)  Answer:  \\[email or web address]\[email or web address]\Images\Pharmacy\HeparinInfusions\heparin LOW INTENSITY nomogram for [facility] [id number]    03/02/20 0904     heparin 25,000 units in dextrose 5% (100 units/ml) IV bolus from bag - ADDITIONAL PRN BOLUS - 60 units/kg (max bolus 4000 units)  As needed (PRN)     Question:  Heparin Infusion Adjustment (DO NOT MODIFY ANSWER)  Answer:  \\[email or web address]\[email or web address]\Images\Pharmacy\HeparinInfusions\heparin LOW INTENSITY nomogram for [facility] [id number].pdf    03/02/20 0904     heparin 25,000 units in dextrose 5% (100 units/ml) IV bolus from bag - ADDITIONAL PRN BOLUS - 30 units/kg (max bolus 4000 units)  As needed (PRN)     Question:  Heparin Infusion Adjustment (DO NOT MODIFY ANSWER)  Answer:  \[email or web address]\[email or web address]\Images\Pharmacy\HeparinInfusions\heparin LOW INTENSITY nomogram for [facility] [id number]    03/02/20 0904     IP VTE HIGH RISK PATIENT  Once      03/01/20 2115     Place sequential compression device  Until discontinued      03/01/20 2115                Thank you for your consult. I will follow-up with patient. Please contact us if you have any additional questions.    Marii Link, STEVIEP-C  Cardiology   [facility]

## 2020-03-02 NOTE — HPI
Erendira Pretty is a 74 year old male with CAD with remote CABG x 2 vessels in /2018, HTN, HLD, DM II, COPD and DELONTE who presented to the ED with complaints of intermittent substernal chest pain that has waxed and waned over the past 2 weeks.  The pain is tight in nature, moderate in intensity, nonradiating and nonexertional. Associated symptoms include indigestion and fatigue. He was seen in the ED earlier this morning for above symptoms. At that time, his troponin was negative x 2 and his EKG was unrevealing. The patient was discharged home. Intermittent chest pain continued throughout the day. So, the patient returned to the ED. He reports being under increased stress recently. He denies any SOB, MARCUS, diaphoresis, nausea, vomiting, neck pain, jaw pain, upper extremity pain, numbness, tingling, back pain, headache, lightheadedness, dizziness, rhinorrhea, sore throat, cough, wheezing, fever or chills. Initial work-up in the ED was benign with negative troponin, unremarkable CXR and unrevealing EKG. The case was discussed with Cardiology, who recommend observation for possible noninvasive versus invasive CAD work-up given his HEART score of 4.

## 2020-03-02 NOTE — ASSESSMENT & PLAN NOTE
- Chest pain free on admission.  Troponin negative x 3.  EKG unrevealing.  HEART score of 4.  - Continue ASA and beta blocker.  No statin due to h/o intolerance.  - SL NTG as needed.  - Trend serial cardiac enzymes.  - Recent FLP reviewed.  - Cardiology consulted, awaiting recs.

## 2020-03-02 NOTE — ASSESSMENT & PLAN NOTE
- Continue basal insulin at 30 units daily.  - AccuChecks with low dose SSI.  - Diabetic diet.  - Recent HbA1c of 7.6.

## 2020-03-02 NOTE — PROGRESS NOTES
03/02/20 1339   Vital Signs   Pulse 87   Heart Rate Source Monitor   Resp 18   SpO2 95 %   O2 Device (Oxygen Therapy) room air   BP (!) 186/95   BP Location Left arm   BP Method Automatic   Patient Position Lying   Pt c/o midsternal chest pain 5/10 accompanying sx of tightness, stated that the nitro paste was giving him a headache when it was first placed per time of MAR this morning, pt now stated that the paste doesn't feel like its working cause he doesn't have a headache anymore and can now feel the chest tightness, giving nitro SLx1 now

## 2020-03-02 NOTE — H&P
[facility] Hospital Medicine  History & Physical    Patient Name: Erendira Pretty  MRN: [id number]  Admission Date: 3/1/2020  Attending Physician: Bambi Nelson MD   Primary Care Provider: Cortes Roblero MD         Patient information was obtained from patient, past medical records and ER records.     Subjective:     Principal Problem:Chest pain with high risk of acute coronary syndrome    Chief Complaint:   Chief Complaint   Patient presents with    Chest Pain     seen here for same complaint this morning, discharged at noon; CP returned at 1700, described at tightness, denies n/v, denies SOB        HPI: Mr. Pretty is a 74 y.o. male with a PMHx of CAD with remote CABG x 2V (LIMA to LAD and reverse SVG to OM 2) in 11/2018, HTN, HLD, DM II, COPD, and DELONTE.  He presented to the ED with c/o intermittent substernal chest pain that has waxed and waned over the past 2 weeks.  Pain is tight in nature, moderate in intensity, nonradiating, and nonexertional.  Associated indigestion and fatigue.  He was seen in the ED earlier this morning for above symptoms.  Troponin was negative x 2 and EKG unrevealing, and patient was discharged home.  Intermittent CP continued throughout the day, so patient returned to the ED.  He reports being under increased stress recently.  Denies any SOB/MARCUS, diaphoresis, N/V, neck or jaw pain, upper extremity pain or numbness/tingling, back pain, HA, lightheadedness, dizziness, rhinorrhea, sore throat, cough, wheezing, fever or chills.  Initial work-up in the ED was benign with negative troponin, unremarkable CXR, and unrevealing EKG.  Case discussed with Cardiology, who recommend admitting to Observation for possible noninvasive vs invasive CAD work-up given HEART score of 4.  Hospital Medicine was called for admission.      Past Medical History:   Diagnosis Date    Aortic stenosis     Asthma     BPH (benign prostatic hyperplasia)     CAD (coronary artery disease)     40%  lesion 2002;lazo    Cirrhosis     Claudication 4/9/2014    Colon polyp     COPD (chronic obstructive pulmonary disease)     ED (erectile dysfunction)     Encounter for blood transfusion     Ex-smoker     Hearing loss NEC     Hepatitis C     Cured;reji brown 2015    Hyperlipidemia     Hypertension     Hypothyroid     s/p tx graves    DELONTE on CPAP     Osteoarthritis     PVD (peripheral vascular disease)     Type 2 diabetes mellitus        Past Surgical History:   Procedure Laterality Date    CARDIAC CATHETERIZATION      CARDIAC SURGERY      CARPAL TUNNEL RELEASE Left     CATARACT EXTRACTION      CATARACT EXTRACTION W/ INTRAOCULAR LENS  IMPLANT, BILATERAL  2008    CATHETERIZATION OF BOTH LEFT AND RIGHT HEART N/A 11/2/2018    Procedure: CATHETERIZATION, HEART, BOTH LEFT AND RIGHT;  Surgeon: Frank Lazo MD;  Location: [facility] CATH LAB;  Service: Cardiology;  Laterality: N/A;    COLONOSCOPY  8/2013    COLONOSCOPY N/A 5/30/2016    Procedure: COLONOSCOPY;  Surgeon: Anna Tomas MD;  Location: [facility] ENDO;  Service: Endoscopy;  Laterality: N/A;    COLONOSCOPY N/A 7/17/2019    Procedure: COLONOSCOPY;  Surgeon: David Carter III, MD;  Location: [facility] ENDO;  Service: Endoscopy;  Laterality: N/A;    COMPUTED TOMOGRAPHY N/A 9/9/2019    Procedure: CT (COMPUTED TOMOGRAPHY);  Surgeon: [facility] Diagnostic Provider;  Location: [facility] OR;  Service: General;  Laterality: N/A;  Microwave ablation to be done in CT.  Need CRNA and cart    CORONARY ARTERY BYPASS GRAFT (CABG) N/A 11/5/2018    Procedure: CORONARY ARTERY BYPASS GRAFT (CABG);  Surgeon: Bear De Luna MD;  Location: [facility] OR;  Service: Cardiothoracic;  Laterality: N/A;  TWO VESSEL BYPASS WITH AORTOTOMY AND EXCISION OF ATHEROSCLEROTIC PLAQUE    ELBOW BURSA SURGERY Right 2010    ENDOSCOPIC HARVEST OF VEIN Left 11/5/2018    Procedure: SURGICAL PROCUREMENT, VEIN, ENDOSCOPIC;  Surgeon: Bear De Luna MD;  Location: [facility] OR;  Service:  Cardiothoracic;  Laterality: Left;    ESOPHAGOGASTRODUODENOSCOPY N/A 7/17/2019    Procedure: ESOPHAGOGASTRODUODENOSCOPY (EGD);  Surgeon: David Carter III, MD;  Location: [facility] ENDO;  Service: Endoscopy;  Laterality: N/A;    ETHMOIDECTOMY Bilateral 3/27/2019    Procedure: ETHMOIDECTOMY;  Surgeon: Alejandro Parkinson MD;  Location: [facility] OR;  Service: ENT;  Laterality: Bilateral;    EYE SURGERY      FRONTAL SINUS OBLITERATION Bilateral 3/27/2019    Procedure: SINUSOTOMY, FRONTAL SINUS, OBLITERATIVE;  Surgeon: Alejandro Parkinson MD;  Location: [facility] OR;  Service: ENT;  Laterality: Bilateral;    FUNCTIONAL ENDOSCOPIC SINUS SURGERY (FESS) Bilateral 3/27/2019    Procedure: FESS (FUNCTIONAL ENDOSCOPIC SINUS SURGERY);  Surgeon: Alejandro Parkinson MD;  Location: [facility] OR;  Service: ENT;  Laterality: Bilateral;  Left Keila bullosa resection     KNEE ARTHROSCOPY W/ MENISCAL REPAIR Left 06/2019    dr boykin    KNEE SURGERY Right     MAXILLARY ANTROSTOMY Bilateral 3/27/2019    Procedure: MAXILLARY ANTROSTOMY;  Surgeon: Alejandro Parkinson MD;  Location: [facility] OR;  Service: ENT;  Laterality: Bilateral;    NASAL SEPTOPLASTY N/A 3/27/2019    Procedure: SEPTOPLASTY, NOSE;  Surgeon: Alejandro Parkinson MD;  Location: [facility] OR;  Service: ENT;  Laterality: N/A;    RECTAL SURGERY  2012    TRANSURETHRAL RESECTION OF PROSTATE (TURP) WITHOUT USE OF LASER N/A 6/19/2018    Procedure: TURP, WITHOUT USING LASER;  Surgeon: Cooper Cordova IV, MD;  Location: [facility] OR;  Service: Urology;  Laterality: N/A;    TRIGGER FINGER RELEASE Left        Review of patient's allergies indicates:   Allergen Reactions    Lipitor [atorvastatin] Other (See Comments)     Muscle aches and pains as well as fatigue.     Niacin preparations Other (See Comments)     Causes broken blood vessels and bruises    Iodinated contrast media Hives     Tachycardia    Omeprazole Hives and Itching    Prilosec [omeprazole magnesium] Hives and Itching       Current Facility-Administered Medications on File  Prior to Encounter   Medication    [COMPLETED] aspirin chewable tablet 324 mg    [COMPLETED] enalaprilat injection 2.5 mg    [COMPLETED] labetalol injection 20 mg    lactated ringers infusion    [COMPLETED] nitroGLYCERIN 2% TD oint ointment 1 inch     Current Outpatient Medications on File Prior to Encounter   Medication Sig    budesonide-formoterol 160-4.5 mcg (SYMBICORT) 160-4.5 mcg/actuation HFAA INHALE 2 PUFFS INTO THE LUNGS TWO TIMES A DAY    furosemide (LASIX) 40 MG tablet Take 1 tablet (40 mg) by mouth 2 times a day and 1 extra if needed for 14 days.    HUMALOG KWIKPEN INSULIN 100 unit/mL pen INJECT 3-4 UNITS INTO THE SKIN THREE TIMES DAILY BEFORE MEALS.    insulin (LANTUS SOLOSTAR U-100 INSULIN) glargine 100 units/mL (3mL) SubQ pen Inject 40 Units into the skin once daily.    krill oil 500 mg Cap Take by mouth.    levocetirizine (XYZAL) 5 MG tablet TAKE ONE TABLET BY MOUTH EVERY EVENING    metoprolol succinate (TOPROL-XL) 50 MG 24 hr tablet TAKE 1 TABLET BY MOUTH TWO TIMES A DAY    milk thistle 175 mg tablet Take 175 mg by mouth once daily.    MV,CA,IRON,MIN/FA/PHYTOSTEROL (CENTRUM [facility] HEART ORAL) Take 1 tablet by mouth once daily.     RABEprazole (ACIPHEX) 20 mg tablet Take 20 mg by mouth once daily.    SYNTHROID 137 mcg Tab tablet TAKE 2 TABLETS BY MOUTH BEFORE BREAKFAST.    ACCU-CHEK GRACE PLUS METER Misc AS DIRECTED    albuterol-ipratropium (DUO-NEB) 2.5 mg-0.5 mg/3 mL nebulizer solution Take 3 mLs by nebulization 2 (two) times daily. Rescue    azelastine (ASTELIN) 137 mcg (0.1 %) nasal spray 2 sprays (274 mcg total) by Nasal route 2 (two) times daily.    blood sugar diagnostic (ACCU-CHEK GRACE PLUS TEST STRP) Strp TEST THREE TIMES A DAY    diclofenac sodium (VOLTAREN) 1 % Gel Apply 2 g topically 4 (four) times daily.    docusate sodium (COLACE) 100 MG capsule Take 1 capsule (100 mg total) by mouth 2 (two) times daily.    finasteride (PROSCAR) 5 mg tablet Take 1 tablet (5 mg  "total) by mouth once daily.    FLUAD 1824-7629, 65 YR UP,,PF, 45 mcg (15 mcg x 3)/0.5 mL Syrg     GLUCOSAMINE HCL/CHONDR ROSARIO A NA (OSTEO BI-FLEX ORAL) Take 1 tablet by mouth 2 (two) times daily.     HYDROcodone-acetaminophen (NORCO)  mg per tablet Take 1 tablet by mouth every 4 (four) hours as needed for Pain.    lancets (ACCU-CHEK FASTCLIX LANCET DRUM) Misc TEST TWO TIMES A DAY    lisinopril 10 MG tablet Take 1 tablet (10 mg total) by mouth once daily.    mometasone (NASONEX) 50 mcg/actuation nasal spray 2 sprays by Nasal route once daily.    montelukast (SINGULAIR) 10 mg tablet TAKE ONE TABLET BY MOUTH EVERY DAY    pen needle, diabetic (BD INSULIN PEN NEEDLE UF MINI) 31 gauge x 3/16" Ndle USE AS DIRECTED    polyethylene glycol (GLYCOLAX) 17 gram/dose powder Take 17 g ( 1 capful) by mouth once daily.    predniSONE (DELTASONE) 20 MG tablet Take 1 tablet (20 mg total) by mouth As instructed. 3tab po daily x3days,2tabs po daily x3days,1tab po daily x3days,1/2tab po daily x3days    sildenafil (REVATIO) 20 mg Tab Take 2 tables by mouth one hour prior to intercourse.  Max 2 per 24 hours    sildenafil (REVATIO) 20 mg Tab Take 2 tablets one hour prior to intercourse. Max two per 24 hours     Family History     Problem Relation (Age of Onset)    Heart disease Mother, Father, Brother        Tobacco Use    Smoking status: Former Smoker     Packs/day: 0.50     Years: 4.00     Pack years: 2.00     Last attempt to quit: 1972     Years since quittin.7    Smokeless tobacco: Former User     Types: Chew     Quit date: 1976   Substance and Sexual Activity    Alcohol use: Yes     Alcohol/week: 2.0 standard drinks     Types: 2 Cans of beer per week     Comment: daily  No alcohol prior to surgery    Drug use: No    Sexual activity: Yes     Partners: Female     Review of Systems   Constitutional: Negative for chills, diaphoresis, fatigue and fever.   HENT: Negative for congestion, postnasal drip, " rhinorrhea, sinus pressure and sore throat.    Respiratory: Negative for cough, shortness of breath and wheezing.    Cardiovascular: Positive for chest pain. Negative for palpitations and leg swelling.   Gastrointestinal: Negative for abdominal pain, nausea and vomiting.   Musculoskeletal: Negative for arthralgias, back pain and myalgias.   Neurological: Negative for dizziness, weakness, light-headedness, numbness and headaches.   Psychiatric/Behavioral: The patient is not nervous/anxious.    All other systems reviewed and are negative.    Objective:     Vital Signs (Most Recent):  Temp: 98.9 °F (37.2 °C) (03/01/20 1813)  Pulse: 65 (03/01/20 2002)  Resp: 19 (03/01/20 2002)  BP: (!) 143/81 (03/01/20 2002)  SpO2: 97 % (03/01/20 2002) Vital Signs (24h Range):  Temp:  [98.5 °F (36.9 °C)-98.9 °F (37.2 °C)] 98.9 °F (37.2 °C)  Pulse:  [65-76] 65  Resp:  [13-30] 19  SpO2:  [95 %-98 %] 97 %  BP: (118-221)/() 143/81     Weight: 112.1 kg (247 lb 3.9 oz)  Body mass index is 37.59 kg/m².    Physical Exam   Constitutional: He is oriented to person, place, and time. He appears well-developed and well-nourished. No distress.   HENT:   Head: Normocephalic and atraumatic.   Eyes: Conjunctivae are normal.   PERRL; EOM intact.   Neck: Normal range of motion. Neck supple.   Cardiovascular: Normal rate, regular rhythm, S1 normal and S2 normal.  No extrasystoles are present. Exam reveals no gallop.   No murmur heard.  Pulmonary/Chest: Effort normal and breath sounds normal. No accessory muscle usage. No tachypnea. No respiratory distress. He has no wheezes. He has no rhonchi. He has no rales. He exhibits no tenderness.   Abdominal: Soft. Bowel sounds are normal. He exhibits no distension. There is no tenderness. There is no rebound, no guarding and no CVA tenderness.   Musculoskeletal: Normal range of motion. He exhibits no edema, tenderness or deformity.   Neurological: He is alert and oriented to person, place, and time.   Skin:  Skin is warm, dry and intact. No rash noted. He is not diaphoretic. No cyanosis or erythema.   Psychiatric: He has a normal mood and affect. His behavior is normal.   Nursing note and vitals reviewed.          Significant Labs:  Results for orders placed or performed during the hospital encounter of 03/01/20   CBC auto differential   Result Value Ref Range    WBC 6.36 3.90 - 12.70 K/uL    RBC 4.46 (L) 4.60 - 6.20 M/uL    Hemoglobin 14.5 14.0 - 18.0 g/dL    Hematocrit 41.5 40.0 - 54.0 %    Mean Corpuscular Volume 93 82 - 98 fL    Mean Corpuscular Hemoglobin 32.5 (H) 27.0 - 31.0 pg    Mean Corpuscular Hemoglobin Conc 34.9 32.0 - 36.0 g/dL    RDW 12.5 11.5 - 14.5 %    Platelets 145 (L) 150 - 350 K/uL    MPV 10.0 9.2 - 12.9 fL    Immature Granulocytes 0.2 0.0 - 0.5 %    Gran # (ANC) 3.5 1.8 - 7.7 K/uL    Immature Grans (Abs) 0.01 0.00 - 0.04 K/uL    Lymph # 2.0 1.0 - 4.8 K/uL    Mono # 0.7 0.3 - 1.0 K/uL    Eos # 0.1 0.0 - 0.5 K/uL    Baso # 0.05 0.00 - 0.20 K/uL    nRBC 0 0 /100 WBC    Gran% 54.7 38.0 - 73.0 %    Lymph% 31.0 18.0 - 48.0 %    Mono% 11.3 4.0 - 15.0 %    Eosinophil% 2.0 0.0 - 8.0 %    Basophil% 0.8 0.0 - 1.9 %    Differential Method Automated    Comprehensive metabolic panel   Result Value Ref Range    Sodium 140 136 - 145 mmol/L    Potassium 3.7 3.5 - 5.1 mmol/L    Chloride 102 95 - 110 mmol/L    CO2 28 23 - 29 mmol/L    Glucose 150 (H) 70 - 110 mg/dL    BUN, Bld 15 8 - 23 mg/dL    Creatinine 1.2 0.5 - 1.4 mg/dL    Calcium 9.3 8.7 - 10.5 mg/dL    Total Protein 7.8 6.0 - 8.4 g/dL    Albumin 3.9 3.5 - 5.2 g/dL    Total Bilirubin 0.4 0.1 - 1.0 mg/dL    Alkaline Phosphatase 70 55 - 135 U/L    AST 26 10 - 40 U/L    ALT 32 10 - 44 U/L    Anion Gap 10 8 - 16 mmol/L    eGFR if African American >60 >60 mL/min/1.73 m^2    eGFR if non African American 60 >60 mL/min/1.73 m^2   Troponin I #1   Result Value Ref Range    Troponin I 0.018 0.000 - 0.026 ng/mL     *Note: Due to a large number of results and/or encounters  for the requested time period, some results have not been displayed. A complete set of results can be found in Results Review.      All pertinent labs within the past 24 hours have been reviewed.    Significant Imaging:  Imaging Results    None        I have reviewed all pertinent imaging results/findings within the past 24 hours.     EKG: (personally reviewed)  Sinus rhythm with sinus arrhythmia and 1st degree AV block, nonspecific T wave abnormality. No acute ischemic ST-T changes from previous tracings.           Assessment/Plan:     * Chest pain with high risk of acute coronary syndrome  - Chest pain free on admission.  Troponin negative x 3.  EKG unrevealing.  HEART score of 4.  - Continue ASA and beta blocker.  No statin due to h/o intolerance.  - SL NTG as needed.  - Trend serial cardiac enzymes.  - Recent FLP reviewed.  - Cardiology consulted, awaiting recs.    CAD with remote CABG x 2V (LIMA-LAD and SVG-RCA) in 11/2018  - Continue medical management as above.    Essential hypertension  - Continue home lisinopril, Toprol-XL, and Lasix.    Type 2 diabetes mellitus, with long-term current use of insulin  - Continue basal insulin at 30 units daily.  - AccuChecks with low dose SSI.  - Diabetic diet.  - Recent HbA1c of 7.6.      VTE Risk Mitigation (From admission, onward)         Ordered     enoxaparin injection 40 mg  Daily      03/01/20 2115     IP VTE HIGH RISK PATIENT  Once      03/01/20 2115     Place sequential compression device  Until discontinued      03/01/20 2115                   Cynthia Reeves NP  Department of Hospital Medicine   [facility] -

## 2020-03-02 NOTE — OP NOTE
INPATIENT Operative Note         SUMMARY     Surgery Date: 3/2/2020     Surgeon(s) and Role:     * Cora Cormier MD - Primary    ASSISTANT:none    Pre-op Diagnosis:  Unstable angina [I20.0]      Post-op Diagnosis:  Unstable angina [I20.0]    Procedure(s) (LRB):  CATHETERIZATION, HEART, LEFT (Left)  Bypass graft study    COMPLICATION:none    Anesthesia: RN IV Sedation    Findings/Key Components:  severe distal lmca ostial lad and lcx   rca luminal irregularities.  Lima to lad patent distal lad 40% svg to om2 patent .  Severe lvh hypercontractile lvf.  As mild dpg 13 mmhg.      Estimated Blood Loss: < 50 ML.         SPECIMEN: NONE    Devices/Prostetics: None    PLAN:  Maximize medical therapy.

## 2020-03-02 NOTE — PLAN OF CARE
Pt AAOx4 .POC reviewed with pt. Pt verbalized understanding   Pt remains free of injuries and falls  Afib on tele monitor. HR 60's- 70's  IV saline locked; intact  C/O of headache pain 7/10  Blood glucose monitoring   Pt turns independently   Bed low, side rails up x2, non slip socks in use, call light in reach   Reminded to call for assistance  Hourly rounding complete will continue to monitor

## 2020-03-02 NOTE — PLAN OF CARE
Patient/ family updated on plan of care, no questions at this time, will continue to monitor Gianluca Alexander 03/02/2020 3:14 PM

## 2020-03-02 NOTE — NURSING
"Patient assessed for diabetes educational needs following chart review  Family at bedside for info  He reports was diagnosed over 7 years ago and has attended diabetes education class in the past  He has a home glucose monitor and normally checks "sporadically"  Reports for the p[ast 2 weeks he has tested 3 times daily as per a request from his PCP for PCP visit, which was scheduled for today.  His average has been 140  He is consistent with administering his insulin   He administers Lantus 40 units each morning and Humalog 6-10 units in the evening.   Recommended he discuss with his PCP to ensure he is dosing at the correct times  Reviewed  Reviewed info on target glucose values, hyper/hypoglycemia  He verbalizes understanding   Does not need literature  "

## 2020-03-02 NOTE — CONSULTS
[facility] -   Cardiology  Consult Note    Patient Name: Erendira Pretty  MRN: [id number]  Admission Date: 3/1/2020  Hospital Length of Stay: 0 days  Code Status: Full Code   Attending Provider: Agnieszka Keating MD   Consulting Provider: LIDA Porter  Primary Care Physician: Cortes Roblero MD  Principal Problem:Unstable angina    Patient information was obtained from patient, relative(s), past medical records and ER records.     Inpatient consult to Cardiology  Consult performed by: LIDA Palacios  Consult ordered by: Cynthia Reeves NP        Subjective:     Chief Complaint:  Chest pain     HPI:   Erendira Pretty is a 74 year old male who presented to [facility] due to recurrent mid-sternal chest pain for the past 2 weeks. He was discharged from this ED yesterday and developed recurrent chest pain and returned to ED. His current medical conditions include CAD s/p CABG x2V (LIMA-LAD, RSVG-OM2) in 2018, HTN, HLP, DM Type II, COPD, DELONTE. ED workup revealed troponin negative x 2, EKG unrevealing. He was placed in observation under the care of hospital medicine. Cardiology consulted to assist with medical management. Chart reviewed, patient seen and examined. Denies chest pain on exam today. Will start IV heparin gtt for unstable angina and IVF's for pre-procedure hydration today. Keep NPO for now, ECHO pending.  Plan for [facility] today for definitive evaluation. Further recs to follow.     Past Medical History:   Diagnosis Date    Aortic stenosis     Asthma     BPH (benign prostatic hyperplasia)     CAD (coronary artery disease)     40% lesion 2002;lazo    Cirrhosis     Claudication 4/9/2014    Colon polyp     COPD (chronic obstructive pulmonary disease)     ED (erectile dysfunction)     Encounter for blood transfusion     Ex-smoker     Hearing loss NEC     Hepatitis C     Cured;reji brown 2015    Hyperlipidemia     Hypertension     Hypothyroid     s/p tx graves    DELONTE on CPAP      Osteoarthritis     PVD (peripheral vascular disease)     Type 2 diabetes mellitus        Past Surgical History:   Procedure Laterality Date    CARDIAC CATHETERIZATION      CARDIAC SURGERY      CARPAL TUNNEL RELEASE Left     CATARACT EXTRACTION      CATARACT EXTRACTION W/ INTRAOCULAR LENS  IMPLANT, BILATERAL  2008    CATHETERIZATION OF BOTH LEFT AND RIGHT HEART N/A 11/2/2018    Procedure: CATHETERIZATION, HEART, BOTH LEFT AND RIGHT;  Surgeon: Frank Gallardo MD;  Location: [facility] CATH LAB;  Service: Cardiology;  Laterality: N/A;    COLONOSCOPY  8/2013    COLONOSCOPY N/A 5/30/2016    Procedure: COLONOSCOPY;  Surgeon: Anna Tomas MD;  Location: [facility] ENDO;  Service: Endoscopy;  Laterality: N/A;    COLONOSCOPY N/A 7/17/2019    Procedure: COLONOSCOPY;  Surgeon: David Carter III, MD;  Location: [facility] ENDO;  Service: Endoscopy;  Laterality: N/A;    COMPUTED TOMOGRAPHY N/A 9/9/2019    Procedure: CT (COMPUTED TOMOGRAPHY);  Surgeon: Sri Diagnostic Provider;  Location: [facility] OR;  Service: General;  Laterality: N/A;  Microwave ablation to be done in CT.  Need CRNA and cart    CORONARY ARTERY BYPASS GRAFT (CABG) N/A 11/5/2018    Procedure: CORONARY ARTERY BYPASS GRAFT (CABG);  Surgeon: Bear De Luna MD;  Location: [facility] OR;  Service: Cardiothoracic;  Laterality: N/A;  TWO VESSEL BYPASS WITH AORTOTOMY AND EXCISION OF ATHEROSCLEROTIC PLAQUE    ELBOW BURSA SURGERY Right 2010    ENDOSCOPIC HARVEST OF VEIN Left 11/5/2018    Procedure: SURGICAL PROCUREMENT, VEIN, ENDOSCOPIC;  Surgeon: Bear De Luna MD;  Location: [facility] OR;  Service: Cardiothoracic;  Laterality: Left;    ESOPHAGOGASTRODUODENOSCOPY N/A 7/17/2019    Procedure: ESOPHAGOGASTRODUODENOSCOPY (EGD);  Surgeon: David Carter III, MD;  Location: [facility] ENDO;  Service: Endoscopy;  Laterality: N/A;    ETHMOIDECTOMY Bilateral 3/27/2019    Procedure: ETHMOIDECTOMY;  Surgeon: Alejandro Parkinson MD;  Location: [facility] OR;  Service: ENT;  Laterality: Bilateral;     EYE SURGERY      FRONTAL SINUS OBLITERATION Bilateral 3/27/2019    Procedure: SINUSOTOMY, FRONTAL SINUS, OBLITERATIVE;  Surgeon: Alejandro Parkinson MD;  Location: [facility] OR;  Service: ENT;  Laterality: Bilateral;    FUNCTIONAL ENDOSCOPIC SINUS SURGERY (FESS) Bilateral 3/27/2019    Procedure: FESS (FUNCTIONAL ENDOSCOPIC SINUS SURGERY);  Surgeon: Alejandro Parkinson MD;  Location: [facility] OR;  Service: ENT;  Laterality: Bilateral;  Left Keila bullosa resection     KNEE ARTHROSCOPY W/ MENISCAL REPAIR Left 06/2019    dr boykin    KNEE SURGERY Right     MAXILLARY ANTROSTOMY Bilateral 3/27/2019    Procedure: MAXILLARY ANTROSTOMY;  Surgeon: Alejandro Parkinson MD;  Location: [facility] OR;  Service: ENT;  Laterality: Bilateral;    NASAL SEPTOPLASTY N/A 3/27/2019    Procedure: SEPTOPLASTY, NOSE;  Surgeon: Alejandro Parkinson MD;  Location: [facility] OR;  Service: ENT;  Laterality: N/A;    RECTAL SURGERY  2012    TRANSURETHRAL RESECTION OF PROSTATE (TURP) WITHOUT USE OF LASER N/A 6/19/2018    Procedure: TURP, WITHOUT USING LASER;  Surgeon: Cooper Cordova IV, MD;  Location: [facility] OR;  Service: Urology;  Laterality: N/A;    TRIGGER FINGER RELEASE Left        Review of patient's allergies indicates:   Allergen Reactions    Lipitor [atorvastatin] Other (See Comments)     Muscle aches and pains as well as fatigue.     Niacin preparations Other (See Comments)     Causes broken blood vessels and bruises    Iodinated contrast media Hives     Tachycardia    Omeprazole Hives and Itching    Prilosec [omeprazole magnesium] Hives and Itching       Current Facility-Administered Medications on File Prior to Encounter   Medication    [COMPLETED] enalaprilat injection 2.5 mg    lactated ringers infusion     Current Outpatient Medications on File Prior to Encounter   Medication Sig    budesonide-formoterol 160-4.5 mcg (SYMBICORT) 160-4.5 mcg/actuation HFAA INHALE 2 PUFFS INTO THE LUNGS TWO TIMES A DAY    furosemide (LASIX) 40 MG tablet Take 1 tablet (40 mg) by mouth 2  times a day and 1 extra if needed for 14 days.    HUMALOG KWIKPEN INSULIN 100 unit/mL pen INJECT 3-4 UNITS INTO THE SKIN THREE TIMES DAILY BEFORE MEALS.    insulin (LANTUS SOLOSTAR U-100 INSULIN) glargine 100 units/mL (3mL) SubQ pen Inject 40 Units into the skin once daily.    krill oil 500 mg Cap Take by mouth.    levocetirizine (XYZAL) 5 MG tablet TAKE ONE TABLET BY MOUTH EVERY EVENING    metoprolol succinate (TOPROL-XL) 50 MG 24 hr tablet TAKE 1 TABLET BY MOUTH TWO TIMES A DAY    milk thistle 175 mg tablet Take 175 mg by mouth once daily.    MV,CA,IRON,MIN/FA/PHYTOSTEROL (CENTRUM SPECIALIST HEART ORAL) Take 1 tablet by mouth once daily.     RABEprazole (ACIPHEX) 20 mg tablet Take 20 mg by mouth once daily.    SYNTHROID 137 mcg Tab tablet TAKE 2 TABLETS BY MOUTH BEFORE BREAKFAST.    ACCU-CHEK GRACE PLUS METER Misc AS DIRECTED    albuterol-ipratropium (DUO-NEB) 2.5 mg-0.5 mg/3 mL nebulizer solution Take 3 mLs by nebulization 2 (two) times daily. Rescue    azelastine (ASTELIN) 137 mcg (0.1 %) nasal spray 2 sprays (274 mcg total) by Nasal route 2 (two) times daily.    blood sugar diagnostic (ACCU-CHEK GRACE PLUS TEST STRP) Strp TEST THREE TIMES A DAY    diclofenac sodium (VOLTAREN) 1 % Gel Apply 2 g topically 4 (four) times daily.    docusate sodium (COLACE) 100 MG capsule Take 1 capsule (100 mg total) by mouth 2 (two) times daily.    finasteride (PROSCAR) 5 mg tablet Take 1 tablet (5 mg total) by mouth once daily.    FLUAD [id number], 65 YR UP,,PF, 45 mcg (15 mcg x 3)/0.5 mL Syrg     GLUCOSAMINE HCL/CHONDR ROSARIO A NA (OSTEO BI-FLEX ORAL) Take 1 tablet by mouth 2 (two) times daily.     HYDROcodone-acetaminophen (NORCO)  mg per tablet Take 1 tablet by mouth every 4 (four) hours as needed for Pain.    lancets (ACCU-CHEK FASTCLIX LANCET DRUM) Misc TEST TWO TIMES A DAY    lisinopril 10 MG tablet Take 1 tablet (10 mg total) by mouth once daily.    mometasone (NASONEX) 50 mcg/actuation nasal spray 2  "sprays by Nasal route once daily.    montelukast (SINGULAIR) 10 mg tablet TAKE ONE TABLET BY MOUTH EVERY DAY    pen needle, diabetic (BD INSULIN PEN NEEDLE UF MINI) 31 gauge x 3/16" Ndle USE AS DIRECTED    polyethylene glycol (GLYCOLAX) 17 gram/dose powder Take 17 g ( 1 capful) by mouth once daily.    predniSONE (DELTASONE) 20 MG tablet Take 1 tablet (20 mg total) by mouth As instructed. 3tab po daily x3days,2tabs po daily x3days,1tab po daily x3days,1/2tab po daily x3days    sildenafil (REVATIO) 20 mg Tab Take 2 tables by mouth one hour prior to intercourse.  Max 2 per 24 hours    sildenafil (REVATIO) 20 mg Tab Take 2 tablets one hour prior to intercourse. Max two per 24 hours     Family History     Problem Relation (Age of Onset)    Heart disease Mother, Father, Brother        Tobacco Use    Smoking status: Former Smoker     Packs/day: 0.50     Years: 4.00     Pack years: 2.00     Last attempt to quit: 1972     Years since quittin.7    Smokeless tobacco: Former User     Types: Chew     Quit date: 1976   Substance and Sexual Activity    Alcohol use: Yes     Alcohol/week: 2.0 standard drinks     Types: 2 Cans of beer per week     Comment: daily  No alcohol prior to surgery    Drug use: No    Sexual activity: Yes     Partners: Female     Review of Systems   Constitution: Positive for malaise/fatigue.   HENT: Negative for hearing loss and hoarse voice.    Eyes: Negative for blurred vision and visual disturbance.   Cardiovascular: Positive for chest pain. Negative for claudication, dyspnea on exertion, irregular heartbeat, leg swelling, near-syncope, orthopnea, palpitations, paroxysmal nocturnal dyspnea and syncope.   Respiratory: Negative for cough, hemoptysis, shortness of breath, sleep disturbances due to breathing, snoring and wheezing.    Endocrine: Negative for cold intolerance and heat intolerance.   Hematologic/Lymphatic: Bruises/bleeds easily.   Skin: Negative for color change, dry " skin and nail changes.   Musculoskeletal: Positive for arthritis and back pain. Negative for joint pain and myalgias.   Gastrointestinal: Negative for bloating, abdominal pain, constipation, nausea and vomiting.   Genitourinary: Negative for dysuria, flank pain, hematuria and hesitancy.   Neurological: Negative for headaches, light-headedness, loss of balance, numbness, paresthesias and weakness.   Psychiatric/Behavioral: Negative for altered mental status.   Allergic/Immunologic: Negative for environmental allergies.     Objective:     Vital Signs (Most Recent):  Temp: 98.2 °F (36.8 °C) (03/02/20 0745)  Pulse: 72 (03/02/20 0745)  Resp: 18 (03/02/20 0745)  BP: (!) 146/65 (03/02/20 0745)  SpO2: 97 % (03/02/20 0745) Vital Signs (24h Range):  Temp:  [97.9 °F (36.6 °C)-98.9 °F (37.2 °C)] 98.2 °F (36.8 °C)  Pulse:  [63-87] 72  Resp:  [13-30] 18  SpO2:  [94 %-98 %] 97 %  BP: (111-213)/() 146/65     Weight: 111.2 kg (245 lb 2.4 oz)  Body mass index is 37.28 kg/m².    SpO2: 97 %  O2 Device (Oxygen Therapy): room air      Intake/Output Summary (Last 24 hours) at 3/2/2020 0804  Last data filed at 3/2/2020 0500  Gross per 24 hour   Intake 480 ml   Output --   Net 480 ml       Lines/Drains/Airways     Peripheral Intravenous Line                 Peripheral IV - Single Lumen 03/01/20 1841 20 G Right Antecubital less than 1 day                Physical Exam   Constitutional: He is oriented to person, place, and time. He appears well-developed and well-nourished. No distress.   HENT:   Head: Normocephalic and atraumatic.   Eyes: Pupils are equal, round, and reactive to light.   Neck: Normal range of motion and full passive range of motion without pain. Neck supple. No JVD present.   Cardiovascular: Normal rate, regular rhythm, S1 normal, S2 normal and intact distal pulses. PMI is not displaced. Exam reveals no distant heart sounds.   No murmur heard.  Pulses:       Radial pulses are 2+ on the right side, and 2+ on the left  side.        Dorsalis pedis pulses are 2+ on the right side, and 2+ on the left side.   CHEST PAIN FREE   Pulmonary/Chest: Effort normal and breath sounds normal. No accessory muscle usage. No respiratory distress. He has no decreased breath sounds. He has no wheezes. He has no rales.   Abdominal: Soft. Bowel sounds are normal. He exhibits no distension. There is no tenderness.   Musculoskeletal: Normal range of motion. He exhibits no edema.        Right ankle: He exhibits no swelling.        Left ankle: He exhibits no swelling.   Neurological: He is alert and oriented to person, place, and time.   Skin: Skin is warm and dry. He is not diaphoretic. No cyanosis. Nails show no clubbing.   Psychiatric: He has a normal mood and affect. His speech is normal and behavior is normal. Judgment and thought content normal. Cognition and memory are normal.   Nursing note and vitals reviewed.      Significant Labs:   BMP:   Recent Labs   Lab 03/01/20  0745 03/01/20 1841 03/02/20  0437   * 150* 139*    140 141   K 3.9 3.7 3.9    102 103   CO2 28 28 28   BUN 14 15 14   CREATININE 1.1 1.2 0.9   CALCIUM 9.3 9.3 8.8   , CBC   Recent Labs   Lab 03/01/20  0745 03/01/20 1841 03/02/20  0916   WBC 4.78 6.36 5.41   HGB 15.2 14.5 13.7*   HCT 44.1 41.5 40.3   * 145* 136*   , Lipid Panel No results for input(s): CHOL, HDL, LDLCALC, TRIG, CHOLHDL in the last 48 hours., Troponin   Recent Labs   Lab 03/01/20  1841 03/02/20  0121 03/02/20  0704   TROPONINI 0.018 0.013 0.014    and All pertinent lab results from the last 24 hours have been reviewed.    Significant Imaging: Echocardiogram:   2D echo with color flow doppler:   Results for orders placed or performed in visit on 11/14/18   2D echo with color flow doppler   Result Value Ref Range    QEF 60 55 - 65    Est. PA Systolic Pressure 32.72     Tricuspid Valve Regurgitation MILD     Narrative    Date of Procedure: 12/13/2018        TEST DESCRIPTION   Technical  Quality: This is a technically challenging study. This study was performed in conjunction with a 3ml intravenous injection of Optison contrast agent.     Aorta: The aortic root is normal in size, measuring 2.7 cm at sinotubular junction and 3.3 cm at Sinuses of Valsalva. The proximal ascending aorta is normal in size, measuring 3.4 cm across.     Left Atrium: The left atrial volume index is normal, measuring 31.79 cc/m2.     Left Ventricle: The left ventricle is normal in size, with an end-diastolic diameter of 4.5 cm, and an end-systolic diameter of 3.4 cm. LV wall thickness is normal, with the septum measuring 1.7 cm and the posterior wall measuring 1.3 cm across. Relative   wall thickness was increased at 0.58, and the LV mass index was increased at 147.5 g/m2 consistent with concentric left ventricular hypertrophy. There are no regional wall motion abnormalities. Left ventricular systolic function appears normal. Visually   estimated ejection fraction is 60-65%. The LV Doppler derived stroke volume equals 72.0 ccs.     Diastolic indices: E wave velocity 0.6 m/s, E/A ratio 1.0,  msec., E/e' ratio(avg) 8. Diastolic function is indeterminate.     Right Atrium: The right atrium is normal in size, measuring 5.2 cm in length and 3.5 cm in width in the apical view.     Right Ventricle: The right ventricle is normal in size measuring 2.7 cm at the base in the apical right ventricle-focused view. Global right ventricular systolic function appears normal. The estimated PA systolic pressure is greater than 33 mmHg.     Aortic Valve:  The aortic valve is moderately sclerotic. The peak gradient obtained across the aortic valve is 8 mmHg.     Mitral Valve:  The mitral valve is mildly sclerotic.     Tricuspid Valve:  The tricuspid valve is normal in structure. There is mild tricuspid regurgitation.     Pulmonary Valve:  The pulmonic valve is not well seen.     IVC: The IVC is not visualized.     Intracavitary: There is  no evidence of pericardial effusion, intracavity mass, thrombi, or vegetation.         CONCLUSIONS     1 - Concentric hypertrophy.     2 - No wall motion abnormalities.     3 - Normal left ventricular systolic function (EF 60-65%).     4 - Indeterminate LV diastolic function.     5 - Normal right ventricular systolic function .     6 - The estimated PA systolic pressure is greater than 33 mmHg.     7 - Mild tricuspid regurgitation.   Poor imaging quality.          This document has been electronically    SIGNED BY: Thomas Israel MD On: 12/13/2018 18:32    and X-Ray: CXR: X-Ray Chest 1 View (CXR): No results found for this visit on 03/01/20. and X-Ray Chest PA and Lateral (CXR): No results found for this visit on 03/01/20. and KUB: X-Ray Abdomen AP 1 View (KUB): No results found for this visit on 03/01/20.    Assessment and Plan:     * Unstable angina  Troponin negative  Worsening angina intensity and duration over past month since his wife passed away 1 month ago  Start IV heparin gtt   Start IVF's for pre-procedure hydration  Allergy to statins, will need to add Repatha/Praluent as OP  Keep NPO for now  Continue ASA, BB, ACEi, NTP  ECHO pending  Plans for [facility] today for definitive evaluation  Risks, benefits and treatment alternatives reviewed with patient. Patient has agreed to proceed, consent obtained and placed on chart.  Further recs to follow.     CAD with remote CABG x 2V (LIMA-LAD and SVG-RCA) in 11/2018  Chest pain free on exam today  S/P CABG x 2 V in 2018  Chest tightness x 1 month worsening in duration  Continue ASA, BB, ACEi, NTP  Start IV Heparin gtt for unstable anginal  Start IVFs for hydration pre-procedure  ECHO pending      Essential hypertension  On medical therapy  Continue BB, ACEi    Type 2 diabetes mellitus, with long-term current use of insulin  -MGMT PER PRIMARY TEAM        VTE Risk Mitigation (From admission, onward)         Ordered     heparin 25,000 units in dextrose 5% 250 mL (100  units/mL) infusion LOW INTENSITY nomogram - [facility]  Continuous     Question:  Heparin Infusion Adjustment (DO NOT MODIFY ANSWER)  Answer:  \\[email or web address]\epic\Images\Pharmacy\HeparinInfusions\heparin LOW INTENSITY nomogram for [facility] [id number]    03/02/20 0904     heparin 25,000 units in dextrose 5% (100 units/ml) IV bolus from bag - ADDITIONAL PRN BOLUS - 60 units/kg (max bolus 4000 units)  As needed (PRN)     Question:  Heparin Infusion Adjustment (DO NOT MODIFY ANSWER)  Answer:  \\[email or web address]\epic\Images\Pharmacy\HeparinInfusions\heparin LOW INTENSITY nomogram for [facility] [id number].pdf    03/02/20 0904     heparin 25,000 units in dextrose 5% (100 units/ml) IV bolus from bag - ADDITIONAL PRN BOLUS - 30 units/kg (max bolus 4000 units)  As needed (PRN)     Question:  Heparin Infusion Adjustment (DO NOT MODIFY ANSWER)  Answer:  \[email or web address]\epic\Images\Pharmacy\HeparinInfusions\heparin LOW INTENSITY nomogram for [facility] [id number]    03/02/20 0904     IP VTE HIGH RISK PATIENT  Once      03/01/20 2115     Place sequential compression device  Until discontinued      03/01/20 2115                Thank you for your consult. I will follow-up with patient. Please contact us if you have any additional questions.    Marii Link, STEVIEP-C  Cardiology   [facility]

## 2020-03-02 NOTE — ASSESSMENT & PLAN NOTE
Chest pain free on exam today  S/P CABG x 2 V in 2018  Chest tightness x 1 month worsening in duration  Continue ASA, BB, ACEi, NTP  Start IV Heparin gtt for unstable anginal  Start IVFs for hydration pre-procedure  ECHO pending

## 2020-03-02 NOTE — PROGRESS NOTES
03/02/20 1349   Vital Signs   Pulse 95   Heart Rate Source Monitor   Resp 18   SpO2 (!) 94 %   O2 Device (Oxygen Therapy) room air   BP (!) 143/86   BP Location Left arm   BP Method Automatic   Patient Position Lying   patient stated that chest pain has resolved, VS as noted, informed medicine and cards about event, will continue to monitor

## 2020-03-03 ENCOUNTER — TELEPHONE (OUTPATIENT)
Dept: CARDIAC SURGERY | Facility: CLINIC | Age: 75
End: 2020-03-03

## 2020-03-03 ENCOUNTER — TELEPHONE (OUTPATIENT)
Dept: INTERNAL MEDICINE | Facility: CLINIC | Age: 75
End: 2020-03-03

## 2020-03-03 VITALS
BODY MASS INDEX: 37.49 KG/M2 | OXYGEN SATURATION: 97 % | HEIGHT: 68 IN | TEMPERATURE: 98 F | DIASTOLIC BLOOD PRESSURE: 83 MMHG | WEIGHT: 247.38 LBS | HEART RATE: 71 BPM | RESPIRATION RATE: 20 BRPM | SYSTOLIC BLOOD PRESSURE: 179 MMHG

## 2020-03-03 PROBLEM — I20.0 UNSTABLE ANGINA: Status: RESOLVED | Noted: 2020-03-01 | Resolved: 2020-03-03

## 2020-03-03 LAB
ANION GAP SERPL CALC-SCNC: 13 MMOL/L (ref 8–16)
BASOPHILS # BLD AUTO: 0.01 K/UL (ref 0–0.2)
BASOPHILS # BLD AUTO: 0.01 K/UL (ref 0–0.2)
BASOPHILS NFR BLD: 0.1 % (ref 0–1.9)
BASOPHILS NFR BLD: 0.1 % (ref 0–1.9)
BUN SERPL-MCNC: 16 MG/DL (ref 8–23)
CALCIUM SERPL-MCNC: 9 MG/DL (ref 8.7–10.5)
CHLORIDE SERPL-SCNC: 103 MMOL/L (ref 95–110)
CO2 SERPL-SCNC: 22 MMOL/L (ref 23–29)
CREAT SERPL-MCNC: 1.2 MG/DL (ref 0.5–1.4)
DIFFERENTIAL METHOD: ABNORMAL
DIFFERENTIAL METHOD: ABNORMAL
EOSINOPHIL # BLD AUTO: 0 K/UL (ref 0–0.5)
EOSINOPHIL # BLD AUTO: 0 K/UL (ref 0–0.5)
EOSINOPHIL NFR BLD: 0 % (ref 0–8)
EOSINOPHIL NFR BLD: 0 % (ref 0–8)
ERYTHROCYTE [DISTWIDTH] IN BLOOD BY AUTOMATED COUNT: 12.5 % (ref 11.5–14.5)
ERYTHROCYTE [DISTWIDTH] IN BLOOD BY AUTOMATED COUNT: 12.5 % (ref 11.5–14.5)
EST. GFR  (AFRICAN AMERICAN): >60 ML/MIN/1.73 M^2
EST. GFR  (NON AFRICAN AMERICAN): 60 ML/MIN/1.73 M^2
GLUCOSE SERPL-MCNC: 215 MG/DL (ref 70–110)
HCT VFR BLD AUTO: 41.6 % (ref 40–54)
HCT VFR BLD AUTO: 41.6 % (ref 40–54)
HGB BLD-MCNC: 14.5 G/DL (ref 14–18)
HGB BLD-MCNC: 14.5 G/DL (ref 14–18)
IMM GRANULOCYTES # BLD AUTO: 0.05 K/UL (ref 0–0.04)
IMM GRANULOCYTES # BLD AUTO: 0.05 K/UL (ref 0–0.04)
IMM GRANULOCYTES NFR BLD AUTO: 0.5 % (ref 0–0.5)
IMM GRANULOCYTES NFR BLD AUTO: 0.5 % (ref 0–0.5)
LYMPHOCYTES # BLD AUTO: 1.1 K/UL (ref 1–4.8)
LYMPHOCYTES # BLD AUTO: 1.1 K/UL (ref 1–4.8)
LYMPHOCYTES NFR BLD: 10.4 % (ref 18–48)
LYMPHOCYTES NFR BLD: 10.4 % (ref 18–48)
MCH RBC QN AUTO: 32.7 PG (ref 27–31)
MCH RBC QN AUTO: 32.7 PG (ref 27–31)
MCHC RBC AUTO-ENTMCNC: 34.9 G/DL (ref 32–36)
MCHC RBC AUTO-ENTMCNC: 34.9 G/DL (ref 32–36)
MCV RBC AUTO: 94 FL (ref 82–98)
MCV RBC AUTO: 94 FL (ref 82–98)
MONOCYTES # BLD AUTO: 0.4 K/UL (ref 0.3–1)
MONOCYTES # BLD AUTO: 0.4 K/UL (ref 0.3–1)
MONOCYTES NFR BLD: 3.2 % (ref 4–15)
MONOCYTES NFR BLD: 3.2 % (ref 4–15)
NEUTROPHILS # BLD AUTO: 9.4 K/UL (ref 1.8–7.7)
NEUTROPHILS # BLD AUTO: 9.4 K/UL (ref 1.8–7.7)
NEUTROPHILS NFR BLD: 85.8 % (ref 38–73)
NEUTROPHILS NFR BLD: 85.8 % (ref 38–73)
NRBC BLD-RTO: 0 /100 WBC
NRBC BLD-RTO: 0 /100 WBC
PLATELET # BLD AUTO: 172 K/UL (ref 150–350)
PLATELET # BLD AUTO: 172 K/UL (ref 150–350)
PMV BLD AUTO: 10.4 FL (ref 9.2–12.9)
PMV BLD AUTO: 10.4 FL (ref 9.2–12.9)
POCT GLUCOSE: 203 MG/DL (ref 70–110)
POCT GLUCOSE: 240 MG/DL (ref 70–110)
POCT GLUCOSE: 275 MG/DL (ref 70–110)
POTASSIUM SERPL-SCNC: 4.1 MMOL/L (ref 3.5–5.1)
RBC # BLD AUTO: 4.44 M/UL (ref 4.6–6.2)
RBC # BLD AUTO: 4.44 M/UL (ref 4.6–6.2)
SODIUM SERPL-SCNC: 138 MMOL/L (ref 136–145)
WBC # BLD AUTO: 10.94 K/UL (ref 3.9–12.7)
WBC # BLD AUTO: 10.94 K/UL (ref 3.9–12.7)

## 2020-03-03 PROCEDURE — 63600175 PHARM REV CODE 636 W HCPCS: Performed by: INTERNAL MEDICINE

## 2020-03-03 PROCEDURE — 85025 COMPLETE CBC W/AUTO DIFF WBC: CPT

## 2020-03-03 PROCEDURE — C9399 UNCLASSIFIED DRUGS OR BIOLOG: HCPCS | Performed by: INTERNAL MEDICINE

## 2020-03-03 PROCEDURE — 25000003 PHARM REV CODE 250: Performed by: INTERNAL MEDICINE

## 2020-03-03 PROCEDURE — 94640 AIRWAY INHALATION TREATMENT: CPT

## 2020-03-03 PROCEDURE — 99231 PR SUBSEQUENT HOSPITAL CARE,LEVL I: ICD-10-PCS | Mod: ,,, | Performed by: INTERNAL MEDICINE

## 2020-03-03 PROCEDURE — 25000242 PHARM REV CODE 250 ALT 637 W/ HCPCS: Performed by: INTERNAL MEDICINE

## 2020-03-03 PROCEDURE — 80048 BASIC METABOLIC PNL TOTAL CA: CPT

## 2020-03-03 PROCEDURE — 94760 N-INVAS EAR/PLS OXIMETRY 1: CPT

## 2020-03-03 PROCEDURE — 36415 COLL VENOUS BLD VENIPUNCTURE: CPT

## 2020-03-03 PROCEDURE — 99231 SBSQ HOSP IP/OBS SF/LOW 25: CPT | Mod: ,,, | Performed by: INTERNAL MEDICINE

## 2020-03-03 RX ORDER — NITROGLYCERIN 0.4 MG/1
0.4 TABLET SUBLINGUAL EVERY 5 MIN PRN
Qty: 45 TABLET | Refills: 0 | Status: SHIPPED | OUTPATIENT
Start: 2020-03-03 | End: 2020-03-03

## 2020-03-03 RX ORDER — ASPIRIN 81 MG/1
81 TABLET ORAL DAILY
Qty: 30 TABLET | Refills: 0 | Status: SHIPPED | OUTPATIENT
Start: 2020-03-04 | End: 2020-03-03

## 2020-03-03 RX ORDER — AMLODIPINE BESYLATE 5 MG/1
5 TABLET ORAL DAILY
Qty: 30 TABLET | Refills: 0 | Status: SHIPPED | OUTPATIENT
Start: 2020-03-04 | End: 2020-03-10

## 2020-03-03 RX ORDER — NITROGLYCERIN 0.4 MG/1
0.4 TABLET SUBLINGUAL EVERY 5 MIN PRN
Qty: 45 TABLET | Refills: 0 | Status: SHIPPED | OUTPATIENT
Start: 2020-03-03 | End: 2021-09-29

## 2020-03-03 RX ORDER — ASPIRIN 81 MG/1
81 TABLET ORAL DAILY
Qty: 30 TABLET | Refills: 0 | Status: SHIPPED | OUTPATIENT
Start: 2020-03-04 | End: 2021-07-21 | Stop reason: SDUPTHER

## 2020-03-03 RX ORDER — AMLODIPINE BESYLATE 5 MG/1
5 TABLET ORAL DAILY
Qty: 30 TABLET | Refills: 0 | Status: SHIPPED | OUTPATIENT
Start: 2020-03-04 | End: 2020-03-03

## 2020-03-03 RX ADMIN — ASPIRIN 81 MG: 81 TABLET, COATED ORAL at 08:03

## 2020-03-03 RX ADMIN — INSULIN DETEMIR 10 UNITS: 100 INJECTION, SOLUTION SUBCUTANEOUS at 11:03

## 2020-03-03 RX ADMIN — LEVOTHYROXINE SODIUM 274 MCG: 50 TABLET ORAL at 05:03

## 2020-03-03 RX ADMIN — INSULIN ASPART 2 UNITS: 100 INJECTION, SOLUTION INTRAVENOUS; SUBCUTANEOUS at 10:03

## 2020-03-03 RX ADMIN — LISINOPRIL 10 MG: 10 TABLET ORAL at 08:03

## 2020-03-03 RX ADMIN — ACETAMINOPHEN 650 MG: 325 TABLET ORAL at 05:03

## 2020-03-03 RX ADMIN — ARFORMOTEROL TARTRATE 15 MCG: 15 SOLUTION RESPIRATORY (INHALATION) at 07:03

## 2020-03-03 RX ADMIN — INSULIN ASPART 1 UNITS: 100 INJECTION, SOLUTION INTRAVENOUS; SUBCUTANEOUS at 12:03

## 2020-03-03 RX ADMIN — AMLODIPINE BESYLATE 5 MG: 5 TABLET ORAL at 08:03

## 2020-03-03 RX ADMIN — BUDESONIDE 0.5 MG: 0.5 SUSPENSION RESPIRATORY (INHALATION) at 07:03

## 2020-03-03 RX ADMIN — MONTELUKAST 10 MG: 10 TABLET, FILM COATED ORAL at 08:03

## 2020-03-03 RX ADMIN — METOPROLOL SUCCINATE 50 MG: 50 TABLET, EXTENDED RELEASE ORAL at 08:03

## 2020-03-03 RX ADMIN — PANTOPRAZOLE SODIUM 40 MG: 40 TABLET, DELAYED RELEASE ORAL at 08:03

## 2020-03-03 RX ADMIN — INSULIN ASPART 2 UNITS: 100 INJECTION, SOLUTION INTRAVENOUS; SUBCUTANEOUS at 05:03

## 2020-03-03 NOTE — HOSPITAL COURSE
3/3/2020-Patient seen and examined today, s/p C yesterday which showed patent grafts, medical mgmt recommended. Feels well. No complaints of chest pain or SOB. Labs stable.

## 2020-03-03 NOTE — PROGRESS NOTES
[facility] Hospital Medicine  Progress Note    Patient Name: Erendira Pretty  MRN: [id number]  Patient Class: IP- Inpatient   Admission Date: 3/1/2020  Length of Stay: 1 days  Attending Physician: Agnieszka Keating MD  Primary Care Provider: Cortes Roblero MD        Subjective:     Principal Problem:Unstable angina        HPI:  Erendira Pretty is a 74 year old male with  CAD with remote CABG x 2 vessels in /2018, HTN, HLD, DM II, COPD and DELONTE who presented to the ED with complaints of intermittent substernal chest pain that has waxed and waned over the past 2 weeks.  The pain is tight in nature, moderate in intensity, nonradiating and nonexertional. Associated symptoms include indigestion and fatigue. He was seen in the ED earlier this morning for above symptoms. At that time, his troponin was negative x 2 and his EKG was unrevealing. The patient was discharged home. Intermittent chest pain continued throughout the day. So, the patient returned to the ED. He reports being under increased stress recently. He denies any SOB, MARCUS, diaphoresis, nausea, vomiting, neck pain, jaw pain, upper extremity pain, numbness, tingling, back pain, headache, lightheadedness, dizziness, rhinorrhea, sore throat, cough, wheezing, fever or chills. Initial work-up in the ED was benign with negative troponin, unremarkable CXR and unrevealing EKG. The case was discussed with Cardiology, who recommend observation for possible noninvasive versus invasive CAD work-up given his HEART score of 4.     Overview/Hospital Course:  The patient was hospitalized for unstable angina. Cardiology ws consulted and recommended LHC which was performed on 3/2/20 and significant for nonobstructive CAD. Medical management was recommended.     Interval History: The patient denies any further episodes of chest pain. He tolerated the radial left heart catheterization well.    Review of Systems   Constitutional: Negative for appetite change, chills,  diaphoresis, fatigue and fever.   HENT: Negative for congestion, ear pain, mouth sores, sore throat and trouble swallowing.    Eyes: Negative for pain and visual disturbance.   Respiratory: Positive for chest tightness. Negative for cough and shortness of breath.    Cardiovascular: Negative for chest pain, palpitations and leg swelling.   Gastrointestinal: Negative for abdominal pain, constipation, diarrhea and nausea.   Endocrine: Negative for cold intolerance, heat intolerance, polydipsia and polyuria.   Genitourinary: Negative for dysuria, frequency and hematuria.   Musculoskeletal: Negative for arthralgias, back pain, myalgias and neck pain.   Skin: Negative for pallor, rash and wound.   Allergic/Immunologic: Negative for environmental allergies and immunocompromised state.   Neurological: Negative for dizziness, seizures, syncope, weakness, numbness and headaches.   Hematological: Negative for adenopathy. Does not bruise/bleed easily.   Psychiatric/Behavioral: Negative for agitation, confusion and sleep disturbance.     Objective:     Vital Signs (Most Recent):  Temp: 97.5 °F (36.4 °C) (03/03/20 0739)  Pulse: 80 (03/03/20 0739)  Resp: 20 (03/03/20 0739)  BP: (!) 157/98 (03/03/20 0739)  SpO2: 97 % (03/03/20 0739) Vital Signs (24h Range):  Temp:  [96.7 °F (35.9 °C)-98.6 °F (37 °C)] 97.5 °F (36.4 °C)  Pulse:  [61-95] 80  Resp:  [16-20] 20  SpO2:  [94 %-99 %] 97 %  BP: (105-186)/(56-99) 157/98     Weight: 112.2 kg (247 lb 5.7 oz)  Body mass index is 37.61 kg/m².    Intake/Output Summary (Last 24 hours) at 3/3/2020 0808  Last data filed at 3/3/2020 0604  Gross per 24 hour   Intake 1709.67 ml   Output --   Net 1709.67 ml      Physical Exam   Constitutional: He is oriented to person, place, and time. He appears well-developed and well-nourished.   HENT:   Head: Normocephalic and atraumatic.   Eyes: Conjunctivae are normal.   Neck: Neck supple. No JVD present.   Cardiovascular: Normal rate, regular rhythm and normal  heart sounds.   Pulmonary/Chest: Effort normal and breath sounds normal. He has no wheezes.   Abdominal: Soft. Bowel sounds are normal. He exhibits no distension. There is no tenderness.   Musculoskeletal:        Left wrist: He exhibits decreased range of motion (splint in place).   Neurological: He is alert and oriented to person, place, and time.   Skin: Skin is warm and dry. No rash noted.   Psychiatric: He has a normal mood and affect. His behavior is normal. Thought content normal.   Nursing note and vitals reviewed.      Significant Labs:   CBC:   Lab 03/01/20 1841 03/02/20  0916   WBC 6.36 5.41   HGB 14.5 13.7*   HCT 41.5 40.3   * 136*     CMP:   Lab 03/01/20  1841      K 3.7      CO2 28   *   BUN 15   CREATININE 1.2   CALCIUM 9.3   PROT 7.8   ALBUMIN 3.9   BILITOT 0.4   ALKPHOS 70   AST 26   ALT 32   ANIONGAP 10   EGFRNONAA 60     Troponin:   Recent Labs   Lab 03/01/20  1841 03/02/20  0121 03/02/20  0704   TROPONINI 0.018 0.013 0.014     All pertinent labs within the past 24 hours have been reviewed.    Significant Imaging: I have reviewed all pertinent imaging results/findings within the past 24 hours.      Assessment/Plan:      * Unstable angina  -Status post [facility] on 3/2/20.   -Medical management recommended by Cardiology.   -Continue ASA, lisinopril and metoprolol.     Essential hypertension  -Stable.   -Continue home lisinopril, Toprol-XL and Lasix.    Type 2 diabetes mellitus, with long-term current use of insulin  -AccuChecks with low dose SSI.  -Diabetic diet.  -Recent HbA1c of 7.6.    CAD with remote CABG x 2V (LIMA-LAD and SVG-RCA) in 11/2018  -Status post left heart catheterization.   -Continue medical management.      VTE Risk Mitigation (From admission, onward)         Ordered     IP VTE HIGH RISK PATIENT  Once      03/01/20 2115     Place sequential compression device  Until discontinued      03/01/20 2115                      ADITYA Avalos  Department of Hospital  Medicine   [facility]

## 2020-03-03 NOTE — NURSING TRANSFER
Nursing Transfer Note      3/2/2020     Transfer to room 212 from [facility]    Transfer via BED    Transfer with TELEMETRY MONITOR INTACT    Transported by karla MONTENEGRO RN    Medicines sent:N/A      Chart send with patient: YES    Notified: SON AWARE OF TRANSPORT TO ROOM 212    Patient reassessed at: 1930 3/2/2020    Upon arrival to floor: TRANSFERRED TO ROOM. TRANSFERRED IN BED.  TELEMETRY MONITER ON.  IV FLUIDS ON PUMP AT PRESCRIBED RATE.  PROCEDURAL ACCESS SITE TO LEFT WRIST  WITHOUT BLEEDING OR HEMATOMA.  DRESSING DRY AND INTACT.  CARE HANDED OFF TO DURGA GRANADOS.

## 2020-03-03 NOTE — TELEPHONE ENCOUNTER
----- Message from Abilio Gamboa sent at 3/3/2020  8:01 AM CST -----  .Type:  Patient Returning Call    Who Called: pt   Who Left Message for Patient:  Does the patient know what this is regarding?: Hospital f/u appt   Would the patient rather a call back or a response via [facility]? Call back   Best Call Back Number: [phone] ( please call this number )   Additional Information: Pt is requesting a call nurse to discuss a work in appt due pt is in the hospital he will need an appt for tmw ( elevated sugar levels )

## 2020-03-03 NOTE — PLAN OF CARE
Problem: Adult Inpatient Plan of Care  Goal: Plan of Care Review  Outcome: Ongoing, Progressing  Pt AAOx4. VSS. Pt remained free of falls this shift. No complaints of pain or discomfort. Pt refusing nitro paste, states he is not having any chest pain and needs to make sure he does not get chest pain without the nitro paste. Pt is NSR with 1st degree AV block and frequent PACs on monitor. PIV intact, infusing NS until 7am. Cath site to L wrist CDI, no complications. Hourly rounding completed. Pt instructed to call for assistance. POC reviewed. Pt verbalized understanding. Will continue to monitor.

## 2020-03-03 NOTE — ASSESSMENT & PLAN NOTE
Chest pain free on exam today  S/P CABG x 2 V in 2018  Chest tightness x 1 month worsening in duration  Continue ASA, BB, ACEi, NTP  Start IV Heparin gtt for unstable anginal  Start IVFs for hydration pre-procedure  ECHO pending    3/3/2020  -s/p LHC yesterday which showed patent grafts  -Medical management recommended  -Amlodipine 5 mg daily added  -Continue ASA, BB, ACEi  -Consider Ranexa or Imdur as OP, if needed  -Follow-up in clinic

## 2020-03-03 NOTE — TELEPHONE ENCOUNTER
Per Dr Gallardo see next week in clinic. Appt made for Monday on [facility]. Instructed it B/P goes high do not wait for appt, seek er trmt. He agreed. laurie

## 2020-03-03 NOTE — PLAN OF CARE
03/03/20 1441   Final Note   Assessment Type Final Discharge Note   Anticipated Discharge Disposition Home   Right Care Referral Info   Post Acute Recommendation No Care

## 2020-03-03 NOTE — NURSING
Discharged order received and reviewed with patient and family. Patient instructed when to take each medication next dose. Prescriptions received. IV removed, tele removed. Patient assisted with dressing by staff. Patient transported to front lobby via wheelchair by staff for family to transport home.

## 2020-03-03 NOTE — HOSPITAL COURSE
The patient was hospitalized for unstable angina. Cardiology ws consulted and recommended LHC which was performed on 3/2/20 and significant for nonobstructive CAD. Medical management was recommended.   3/3-, s/p LHC yesterday which showed patent grafts, medical mgmt recommended. Feels well. No complaints of chest pain or SOB. Labs stable. BP is elevated noted . Pt was just started on Lisinopril . Amlodipine is added by Cardiology . Pt was examined before discharge and found stable and suitable for discharge. Pt will follow up with Cardiology in a week.

## 2020-03-03 NOTE — ASSESSMENT & PLAN NOTE
-Status post LHC on 3/2/20.   -Medical management recommended by Cardiology.   -Continue ASA, lisinopril and metoprolol.

## 2020-03-03 NOTE — SUBJECTIVE & OBJECTIVE
Interval History: The patient denies any further episodes of chest pain. He tolerated the radial left heart catheterization well.    Review of Systems   Constitutional: Negative for appetite change, chills, diaphoresis, fatigue and fever.   HENT: Negative for congestion, ear pain, mouth sores, sore throat and trouble swallowing.    Eyes: Negative for pain and visual disturbance.   Respiratory: Positive for chest tightness. Negative for cough and shortness of breath.    Cardiovascular: Negative for chest pain, palpitations and leg swelling.   Gastrointestinal: Negative for abdominal pain, constipation, diarrhea and nausea.   Endocrine: Negative for cold intolerance, heat intolerance, polydipsia and polyuria.   Genitourinary: Negative for dysuria, frequency and hematuria.   Musculoskeletal: Negative for arthralgias, back pain, myalgias and neck pain.   Skin: Negative for pallor, rash and wound.   Allergic/Immunologic: Negative for environmental allergies and immunocompromised state.   Neurological: Negative for dizziness, seizures, syncope, weakness, numbness and headaches.   Hematological: Negative for adenopathy. Does not bruise/bleed easily.   Psychiatric/Behavioral: Negative for agitation, confusion and sleep disturbance.     Objective:     Vital Signs (Most Recent):  Temp: 97.5 °F (36.4 °C) (03/03/20 0739)  Pulse: 80 (03/03/20 0739)  Resp: 20 (03/03/20 0739)  BP: (!) 157/98 (03/03/20 0739)  SpO2: 97 % (03/03/20 0739) Vital Signs (24h Range):  Temp:  [96.7 °F (35.9 °C)-98.6 °F (37 °C)] 97.5 °F (36.4 °C)  Pulse:  [61-95] 80  Resp:  [16-20] 20  SpO2:  [94 %-99 %] 97 %  BP: (105-186)/(56-99) 157/98     Weight: 112.2 kg (247 lb 5.7 oz)  Body mass index is 37.61 kg/m².    Intake/Output Summary (Last 24 hours) at 3/3/2020 0808  Last data filed at 3/3/2020 0604  Gross per 24 hour   Intake 1709.67 ml   Output --   Net 1709.67 ml      Physical Exam   Constitutional: He is oriented to person, place, and time. He appears  well-developed and well-nourished.   HENT:   Head: Normocephalic and atraumatic.   Eyes: Conjunctivae are normal.   Neck: Neck supple. No JVD present.   Cardiovascular: Normal rate, regular rhythm and normal heart sounds.   Pulmonary/Chest: Effort normal and breath sounds normal. He has no wheezes.   Abdominal: Soft. Bowel sounds are normal. He exhibits no distension. There is no tenderness.   Musculoskeletal:        Left wrist: He exhibits decreased range of motion (splint in place).   Neurological: He is alert and oriented to person, place, and time.   Skin: Skin is warm and dry. No rash noted.   Psychiatric: He has a normal mood and affect. His behavior is normal. Thought content normal.   Nursing note and vitals reviewed.      Significant Labs:   CBC:   Recent Labs   Lab 03/01/20 1841 03/02/20  0916 03/03/20  0624   WBC 6.36 5.41 10.94  10.94   HGB 14.5 13.7* 14.5  14.5   HCT 41.5 40.3 41.6  41.6   * 136* 172  172     CMP:   Recent Labs   Lab 03/01/20  1841 03/02/20  0437 03/03/20  0624    141 138   K 3.7 3.9 4.1    103 103   CO2 28 28 22*   * 139* 215*   BUN 15 14 16   CREATININE 1.2 0.9 1.2   CALCIUM 9.3 8.8 9.0   PROT 7.8  --   --    ALBUMIN 3.9  --   --    BILITOT 0.4  --   --    ALKPHOS 70  --   --    AST 26  --   --    ALT 32  --   --    ANIONGAP 10 10 13   EGFRNONAA 60 >60 60     Troponin:   Recent Labs   Lab 03/01/20  1841 03/02/20  0121 03/02/20  0704   TROPONINI 0.018 0.013 0.014     All pertinent labs within the past 24 hours have been reviewed.    Significant Imaging: I have reviewed all pertinent imaging results/findings within the past 24 hours.

## 2020-03-05 ENCOUNTER — LAB VISIT (OUTPATIENT)
Dept: LAB | Facility: HOSPITAL | Age: 75
End: 2020-03-05
Attending: PHYSICIAN ASSISTANT
Payer: MEDICARE

## 2020-03-05 ENCOUNTER — PATIENT OUTREACH (OUTPATIENT)
Dept: ADMINISTRATIVE | Facility: CLINIC | Age: 75
End: 2020-03-05

## 2020-03-05 DIAGNOSIS — K74.60 HEPATIC CIRRHOSIS, UNSPECIFIED HEPATIC CIRRHOSIS TYPE, UNSPECIFIED WHETHER ASCITES PRESENT: ICD-10-CM

## 2020-03-05 DIAGNOSIS — Z95.1 S/P CABG X 2: ICD-10-CM

## 2020-03-05 DIAGNOSIS — K76.9 LIVER LESION: ICD-10-CM

## 2020-03-05 LAB
ALBUMIN SERPL BCP-MCNC: 3.4 G/DL (ref 3.5–5.2)
ALP SERPL-CCNC: 75 U/L (ref 55–135)
ALT SERPL W/O P-5'-P-CCNC: 33 U/L (ref 10–44)
AST SERPL-CCNC: 25 U/L (ref 10–40)
BILIRUB DIRECT SERPL-MCNC: 0.2 MG/DL (ref 0.1–0.3)
BILIRUB SERPL-MCNC: 0.5 MG/DL (ref 0.1–1)
CHOLEST SERPL-MCNC: 197 MG/DL (ref 120–199)
CHOLEST/HDLC SERPL: 4.4 {RATIO} (ref 2–5)
HDLC SERPL-MCNC: 45 MG/DL (ref 40–75)
HDLC SERPL: 22.8 % (ref 20–50)
LDLC SERPL CALC-MCNC: 101.2 MG/DL (ref 63–159)
NONHDLC SERPL-MCNC: 152 MG/DL
PROT SERPL-MCNC: 7 G/DL (ref 6–8.4)
TRIGL SERPL-MCNC: 254 MG/DL (ref 30–150)

## 2020-03-05 PROCEDURE — 80061 LIPID PANEL: CPT

## 2020-03-05 PROCEDURE — 80076 HEPATIC FUNCTION PANEL: CPT

## 2020-03-05 PROCEDURE — 36415 COLL VENOUS BLD VENIPUNCTURE: CPT | Mod: PO

## 2020-03-05 PROCEDURE — 82105 ALPHA-FETOPROTEIN SERUM: CPT

## 2020-03-05 NOTE — PATIENT INSTRUCTIONS
Unstable Angina     Unstable angina is usually caused by coronary artery disease (CAD).   Angina is a feeling of pain, tightness, pressure, or discomfort in and around your chest. It can occur if your heart muscle isnt getting enough oxygen-rich blood. There are 2 kinds of angina. They are stable and unstable. Stable angina occurs at times you can predict. This might be during or after exercise or when exerting yourself. This type of angina can often be managed with medicine or rest.  Unstable angina does not occur at predictable times. And it may not respond to the usual forms of treatment. It is a warning that a heart attack (acute myocardial infarction) is possible in the near future. For this reason, it should be treated right away. This sheet tells you more about unstable angina. If you have questions, be sure to ask your healthcare provider.  Coronary disease causes angina   Your heart is a muscle. It gets oxygen from the blood sent through the coronary arteries. Coronary artery disease (CAD) occurs when fatty material (plaque) builds up within your artery walls. Plaque irritates (inflames) the artery wall. The buildup of plaque can reduce blood flow to your heart. This reduces the amount of oxygen your heart gets. The lower amount of oxygen can cause the chest pain of angina. Other symptoms of angina include lightheadedness, shortness of breath, nausea, abdominal pain, or unexplained sweating.  The difference between stable and unstable angina  · Stable angina. This type of angina occurs most often during exercise or times of stress. Stable angina goes away when you rest or take nitroglycerin. This is a medicine that allows the heart muscle to relax. It helps increase blood flow through your arteries.  · Unstable angina. This type of angina is caused when a piece of plaque breaks off (ruptures). A blood clot can form at the site of the rupture. The clot reduces blood flow even more. Unstable angina is  "described as chest pain that occurs unpredictably even at rest. You may also be diagnosed with unstable angina if you have stable angina that becomes more severe, lasts longer, or that is not relieved by rest or medicine.  How unstable angina feels  Unstable and stable angina have the same symptoms. With unstable angina, the symptoms are more severe and last longer. Symptoms include:  · Discomfort, aching, heaviness, tightness, squeezing, or pressure. You may feel this in your chest or back. You may also feel it in your arm, shoulder, neck, jaw, or upper abdomen.  · Feeling more tired than usual for no clear reason  · Nausea  · Unexplained sweating  · Shortness of breath  Not everyone who has a heart attack has the typical symptom of chest pain. You may be having a "silent" (unrecognized) heart attack if you lose consciousness (syncope), or have confusion, weakness, or changes in thinking (delirium). Get medical help right away to find out if you are having a heart attack or another serious condition.  Your evaluation  Because unstable angina can lead to a heart attack, it is viewed as an emergency. If you are having symptoms of unstable angina, you should call 911 right away. Your healthcare provider will ask about your symptoms and examine you. Tests will be done quickly. Common tests include:  · Blood tests. These can help tell if there is damage to your heart. They may repeated often, usually every 6 to 8 hours until you have 3 sets. They can also check for cholesterol in the blood, which leads to plaque buildup. They can check for other health problems that affect the heart, such as diabetes.  · Electrocardiogram (ECG). This test records your hearts electrical patterns. A resting ECG can show if you have damage to your heart muscle or a change in the way your heart beats. A stress ECG (stress test) can show how well the blood flows to your heart during exercise.  · Angiography. This test can show where your " arteries are narrowed. The doctor puts a long tube (catheter) in an artery in your arm, neck, or leg (groin). He or she slowly guides it to your heart. Dye is sent through the tube and into the coronary arteries. This makes the arteries show up clearly on X-rays. This helps show any blockages or areas where plaques have narrowed the artery.  Treating unstable angina  Your treatment will depend on the results of your tests. Possible treatments include:  · Observation. If your symptoms are severe, you may need to stay in the hospital to be watched. If your chest pain gets better and tests show no sign of damage to your heart, you'll likely be able to go home.   · Medicines. Your healthcare provider will likely give you nitroglycerin. He or she also may give you medicines that help prevent blood clots, such as aspirin. He or she may also give you medicines to help reduce your blood pressure or slow your heart rate.  · Procedures to improve blood flow. If your chest pain does not get better, your healthcare provider may suggest procedures to improve blood flow to your heart muscle. These can include angioplasty and bypass surgery. Your provider will tell you more about these treatments if you need them.  · Lifestyle changes. Lifestyle changes usually include not smoking, eating healthy foods, and getting regular exercise. These changes will take time to reduce your risk of having a heart attack. For them to work, you will need to do them for the long term. The sooner you start, the better it will be for your overall health. If you have other health problems such as high blood pressure, diabetes, or high cholesterol, these need to be treated as well. They can increase your risk for a heart attack. Lifestyle changes can make unstable angina attacks less frequent and less severe. They also help you manage CAD and reduce your risk for a heart attack.  When taking nitroglycerin  If your healthcare provider  prescribes nitroglycerin, be sure to follow his or her instructions on how to use it. Also be sure to tell your provider if youre taking any other medicines. This includes other prescription and over-the-counter medicines, as well as herbs and other supplements. Don't take nitroglycerin if you take medicines to treat erectile dysfunction or pulmonary hypertension. These can include sildenafil, tadalafil, or vardenafil. The combination of these medicines can cause a dangerous drop in blood pressure.     When to call 911  Call 911 or go to the emergency room (ER) right away if your chest pain:  · Occurs when youre not active.  · Wakes you up from sleep.  · Comes back and is not relieved by your usual dose of nitroglycerin.  · Occurs with weakness, dizziness, fainting, heavy sweating, nausea, or vomiting.  · Lasts longer than 5 minutes.  · Feels like it's getting worse       Date Last Reviewed: 10/1/2016  © 1844-8259 [employer]. [address] [place] [address], [place] [address]. All rights reserved. This information is not intended as a substitute for professional medical care. Always follow your healthcare professional's instructions.

## 2020-03-06 ENCOUNTER — NURSE TRIAGE (OUTPATIENT)
Dept: ADMINISTRATIVE | Facility: CLINIC | Age: 75
End: 2020-03-06

## 2020-03-06 ENCOUNTER — HOSPITAL ENCOUNTER (EMERGENCY)
Facility: HOSPITAL | Age: 75
Discharge: HOME | End: 2020-03-06
Attending: EMERGENCY MEDICINE
Payer: MEDICARE

## 2020-03-06 VITALS
HEART RATE: 73 BPM | WEIGHT: 250.75 LBS | SYSTOLIC BLOOD PRESSURE: 150 MMHG | BODY MASS INDEX: 38.13 KG/M2 | OXYGEN SATURATION: 96 % | DIASTOLIC BLOOD PRESSURE: 87 MMHG | TEMPERATURE: 98 F | RESPIRATION RATE: 18 BRPM

## 2020-03-06 DIAGNOSIS — I10 HYPERTENSION: ICD-10-CM

## 2020-03-06 DIAGNOSIS — I16.0 HYPERTENSIVE URGENCY: Primary | ICD-10-CM

## 2020-03-06 DIAGNOSIS — R51.9 NONINTRACTABLE HEADACHE, UNSPECIFIED CHRONICITY PATTERN, UNSPECIFIED HEADACHE TYPE: ICD-10-CM

## 2020-03-06 LAB
AFP SERPL-MCNC: 4.8 NG/ML (ref 0–8.4)
ALBUMIN SERPL BCP-MCNC: 3.9 G/DL (ref 3.5–5.2)
ALP SERPL-CCNC: 78 U/L (ref 55–135)
ALT SERPL W/O P-5'-P-CCNC: 31 U/L (ref 10–44)
ANION GAP SERPL CALC-SCNC: 10 MMOL/L (ref 8–16)
AST SERPL-CCNC: 26 U/L (ref 10–40)
BASOPHILS # BLD AUTO: 0.04 K/UL (ref 0–0.2)
BASOPHILS NFR BLD: 0.6 % (ref 0–1.9)
BILIRUB SERPL-MCNC: 0.4 MG/DL (ref 0.1–1)
BNP SERPL-MCNC: 67 PG/ML (ref 0–99)
BUN SERPL-MCNC: 14 MG/DL (ref 8–23)
CALCIUM SERPL-MCNC: 9.1 MG/DL (ref 8.7–10.5)
CHLORIDE SERPL-SCNC: 102 MMOL/L (ref 95–110)
CO2 SERPL-SCNC: 24 MMOL/L (ref 23–29)
CREAT SERPL-MCNC: 1.1 MG/DL (ref 0.5–1.4)
DIFFERENTIAL METHOD: ABNORMAL
EOSINOPHIL # BLD AUTO: 0.1 K/UL (ref 0–0.5)
EOSINOPHIL NFR BLD: 2 % (ref 0–8)
ERYTHROCYTE [DISTWIDTH] IN BLOOD BY AUTOMATED COUNT: 12.2 % (ref 11.5–14.5)
EST. GFR  (AFRICAN AMERICAN): >60 ML/MIN/1.73 M^2
EST. GFR  (NON AFRICAN AMERICAN): >60 ML/MIN/1.73 M^2
GLUCOSE SERPL-MCNC: 104 MG/DL (ref 70–110)
HCT VFR BLD AUTO: 41.5 % (ref 40–54)
HGB BLD-MCNC: 14.9 G/DL (ref 14–18)
IMM GRANULOCYTES # BLD AUTO: 0.05 K/UL (ref 0–0.04)
IMM GRANULOCYTES NFR BLD AUTO: 0.7 % (ref 0–0.5)
LYMPHOCYTES # BLD AUTO: 2 K/UL (ref 1–4.8)
LYMPHOCYTES NFR BLD: 29.1 % (ref 18–48)
MCH RBC QN AUTO: 33.2 PG (ref 27–31)
MCHC RBC AUTO-ENTMCNC: 35.9 G/DL (ref 32–36)
MCV RBC AUTO: 92 FL (ref 82–98)
MONOCYTES # BLD AUTO: 0.5 K/UL (ref 0.3–1)
MONOCYTES NFR BLD: 7.7 % (ref 4–15)
NEUTROPHILS # BLD AUTO: 4.2 K/UL (ref 1.8–7.7)
NEUTROPHILS NFR BLD: 59.9 % (ref 38–73)
NRBC BLD-RTO: 0 /100 WBC
PLATELET # BLD AUTO: 166 K/UL (ref 150–350)
PMV BLD AUTO: 9.8 FL (ref 9.2–12.9)
POTASSIUM SERPL-SCNC: 3.7 MMOL/L (ref 3.5–5.1)
PROT SERPL-MCNC: 7.8 G/DL (ref 6–8.4)
RBC # BLD AUTO: 4.49 M/UL (ref 4.6–6.2)
SODIUM SERPL-SCNC: 136 MMOL/L (ref 136–145)
TROPONIN I SERPL DL<=0.01 NG/ML-MCNC: 0.02 NG/ML (ref 0–0.03)
WBC # BLD AUTO: 6.98 K/UL (ref 3.9–12.7)

## 2020-03-06 PROCEDURE — 80053 COMPREHEN METABOLIC PANEL: CPT

## 2020-03-06 PROCEDURE — 96365 THER/PROPH/DIAG IV INF INIT: CPT

## 2020-03-06 PROCEDURE — 93005 ELECTROCARDIOGRAM TRACING: CPT

## 2020-03-06 PROCEDURE — 85025 COMPLETE CBC W/AUTO DIFF WBC: CPT

## 2020-03-06 PROCEDURE — 96375 TX/PRO/DX INJ NEW DRUG ADDON: CPT

## 2020-03-06 PROCEDURE — 83880 ASSAY OF NATRIURETIC PEPTIDE: CPT

## 2020-03-06 PROCEDURE — 93010 EKG 12-LEAD: ICD-10-PCS | Mod: ,,, | Performed by: INTERNAL MEDICINE

## 2020-03-06 PROCEDURE — 93010 ELECTROCARDIOGRAM REPORT: CPT | Mod: ,,, | Performed by: INTERNAL MEDICINE

## 2020-03-06 PROCEDURE — 99284 EMERGENCY DEPT VISIT MOD MDM: CPT | Mod: 25

## 2020-03-06 PROCEDURE — 25000003 PHARM REV CODE 250: Performed by: EMERGENCY MEDICINE

## 2020-03-06 PROCEDURE — 63600175 PHARM REV CODE 636 W HCPCS: Performed by: EMERGENCY MEDICINE

## 2020-03-06 PROCEDURE — 84484 ASSAY OF TROPONIN QUANT: CPT

## 2020-03-06 RX ORDER — BUTALBITAL, ACETAMINOPHEN AND CAFFEINE 50; 325; 40 MG/1; MG/1; MG/1
1 TABLET ORAL EVERY 6 HOURS PRN
Qty: 7 TABLET | Refills: 0 | Status: SHIPPED | OUTPATIENT
Start: 2020-03-06 | End: 2020-03-09

## 2020-03-06 RX ORDER — LABETALOL HYDROCHLORIDE 5 MG/ML
20 INJECTION, SOLUTION INTRAVENOUS
Status: COMPLETED | OUTPATIENT
Start: 2020-03-06 | End: 2020-03-06

## 2020-03-06 RX ORDER — KETOROLAC TROMETHAMINE 30 MG/ML
15 INJECTION, SOLUTION INTRAMUSCULAR; INTRAVENOUS
Status: COMPLETED | OUTPATIENT
Start: 2020-03-06 | End: 2020-03-06

## 2020-03-06 RX ADMIN — LABETALOL HYDROCHLORIDE 20 MG: 5 INJECTION, SOLUTION INTRAVENOUS at 09:03

## 2020-03-06 RX ADMIN — PROMETHAZINE HYDROCHLORIDE 12.5 MG: 25 INJECTION INTRAMUSCULAR; INTRAVENOUS at 09:03

## 2020-03-06 RX ADMIN — KETOROLAC TROMETHAMINE 15 MG: 30 INJECTION, SOLUTION INTRAMUSCULAR; INTRAVENOUS at 09:03

## 2020-03-06 NOTE — TELEPHONE ENCOUNTER
Pt states he was recently in hospital. Pt states he continues with HTN. Current YM=590/99. Pt c/o HA, rates 7/10. Pt advised per protocol to ED now and pt verbalizes understanding.     Reason for Disposition   [1] Systolic BP  >= 160 OR Diastolic >= 100 AND [2] cardiac or neurologic symptoms (e.g., chest pain, difficulty breathing, unsteady gait, blurred vision)    Additional Information   Negative: Difficult to awaken or acting confused (e.g., disoriented, slurred speech)   Negative: Severe difficulty breathing (e.g., struggling for each breath, speaks in single words)   Negative: [1] Weakness of the face, arm or leg on one side of the body AND [2] new onset   Negative: [1] Numbness (i.e., loss of sensation) of the face, arm or leg on one side of the body AND [2] new onset   Negative: [1] Chest pain lasts > 5 minutes AND [2] history of heart disease  (i.e., heart attack, bypass surgery, angina, angioplasty, CHF)   Negative: [1] Chest pain AND [2] took nitrogylcerin AND [3] pain was not relieved   Negative: Sounds like a life-threatening emergency to the triager    Protocols used: HIGH BLOOD PRESSURE-A-AH

## 2020-03-06 NOTE — TELEPHONE ENCOUNTER
B/p 137/88 yest  B/p 146/86 this morning  B/p 164/92 in the last hour  Denies symptoms. Has an appt with pcp on Monday. Advised of disposition per protocol.    Reason for Disposition   Systolic BP  >= 160 OR Diastolic >= 100    Additional Information   Negative: Difficult to awaken or acting confused (e.g., disoriented, slurred speech)   Negative: Severe difficulty breathing (e.g., struggling for each breath, speaks in single words)   Negative: [1] Weakness of the face, arm or leg on one side of the body AND [2] new onset   Negative: [1] Numbness (i.e., loss of sensation) of the face, arm or leg on one side of the body AND [2] new onset   Negative: [1] Chest pain lasts > 5 minutes AND [2] history of heart disease  (i.e., heart attack, bypass surgery, angina, angioplasty, CHF)   Negative: [1] Chest pain AND [2] took nitrogylcerin AND [3] pain was not relieved   Negative: Sounds like a life-threatening emergency to the triager   Negative: Symptom is main concern  (e.g., headache, chest pain)   Negative: Low blood pressure is main concern   Negative: [1] Systolic BP  >= 160 OR Diastolic >= 100 AND [2] cardiac or neurologic symptoms (e.g., chest pain, difficulty breathing, unsteady gait, blurred vision)   Negative: [1] Pregnant > 20 weeks AND [2] new hand or face swelling   Negative: [1] Pregnant > 20 weeks AND [2] BP Systolic BP  >= 140 OR Diastolic >= 90   Negative: [1] Systolic BP  >= 200 OR Diastolic >= 120  AND [2] having NO cardiac or neurologic symptoms   Negative: [1] Postpartum < 6 weeks AND [2] BP Systolic BP  >= 140 OR Diastolic >= 90   Negative: [1] Systolic BP  >= 180 OR Diastolic >= 110 AND [2] missed most recent dose of blood pressure medication   Negative: Systolic BP  >= 180 OR Diastolic >= 110   Negative: Ran out of BP medications    Protocols used: HIGH BLOOD PRESSURE-A-

## 2020-03-07 NOTE — DISCHARGE INSTRUCTIONS
Continue all home medications.  Increase her amlodipine dose to 10 mg daily.  Continue to monitor her blood pressure.  Use Fioricet for headaches.  Follow up with her doctor on Monday for re-evaluation.  Return as needed for any worsening symptoms, problems, questions or concerns

## 2020-03-07 NOTE — ED NOTES
Report received from DARWIN Vega. Introduced self to pt and updated whiteboard.    Patient identifiers verified and correct for Erendira KENRICK Pretty.    LOC: The patient is awake, alert and aware of environment with an appropriate affect, the patient is oriented x 3 and speaking appropriately.  APPEARANCE: Patient resting comfortably and in no acute distress, patient is clean and well groomed, patient's clothing is properly fastened.  SKIN: The skin is warm and dry, color consistent with ethnicity, patient has normal skin turgor and moist mucus membranes, skin intact, no breakdown EX: generalized bruising noted  MUSCULOSKELETAL: Patient moving all extremities spontaneously.  RESPIRATORY: Airway is open and patent, respirations are spontaneous.  CARDIAC: Patient has a normal rate, no peripheral edema noted, capillary refill < 3 seconds.  ABDOMEN: Soft and non tender to palpation.

## 2020-03-07 NOTE — DISCHARGE SUMMARY
[facility] Hospital Medicine  Discharge Summary      Patient Name: Erendira Pretty  MRN: [id number]  Admission Date: 3/1/2020  Hospital Length of Stay: 2 days  Discharge Date and Time:  03/07/2020 5:41 PM  Attending Physician: Dora att. providers found   Discharging Provider: Agnieszka Keating MD  Primary Care Provider: Cortes Roblero MD      HPI:   Erendira Pretty is a 74 year old male with  CAD with remote CABG x 2 vessels in /2018, HTN, HLD, DM II, COPD and DELONTE who presented to the ED with complaints of intermittent substernal chest pain that has waxed and waned over the past 2 weeks.  The pain is tight in nature, moderate in intensity, nonradiating and nonexertional. Associated symptoms include indigestion and fatigue. He was seen in the ED earlier this morning for above symptoms. At that time, his troponin was negative x 2 and his EKG was unrevealing. The patient was discharged home. Intermittent chest pain continued throughout the day. So, the patient returned to the ED. He reports being under increased stress recently. He denies any SOB, MARCUS, diaphoresis, nausea, vomiting, neck pain, jaw pain, upper extremity pain, numbness, tingling, back pain, headache, lightheadedness, dizziness, rhinorrhea, sore throat, cough, wheezing, fever or chills. Initial work-up in the ED was benign with negative troponin, unremarkable CXR and unrevealing EKG. The case was discussed with Cardiology, who recommend observation for possible noninvasive versus invasive CAD work-up given his HEART score of 4.     Procedure(s) (LRB):  CATHETERIZATION, HEART, LEFT (Left)  Bypass graft study      Hospital Course:   The patient was hospitalized for unstable angina. Cardiology ws consulted and recommended LHC which was performed on 3/2/20 and significant for nonobstructive CAD. Medical management was recommended.   3/3-, s/p LHC yesterday which showed patent grafts, medical mgmt recommended. Feels well. No complaints of chest pain  or SOB. Labs stable.  BP is elevated noted . Pt was just started on Lisinopril . Amlodipine is added by Cardiology . Pt was examined before discharge and found stable and suitable for discharge. Pt will follow up with Cardiology in a week.         Consults:   Consults (From admission, onward)        Status Ordering Provider     Inpatient consult to Cardiology  Once     Provider:  Jono Obregon MD    Completed JEFFERY CAT consult to case management  Once     Provider:  (Not yet assigned)    Completed FREDDIE BRUSH          No new Assessment & Plan notes have been filed under this hospital service since the last note was generated.  Service: Hospital Medicine    Final Active Diagnoses:    Diagnosis Date Noted POA    CAD with remote CABG x 2V (LIMA-LAD and SVG-RCA) in 11/2018 [I25.10]  Yes     Chronic    Essential hypertension [I10] 07/08/2013 Yes     Chronic    Type 2 diabetes mellitus, with long-term current use of insulin [E11.9, Z79.4] 07/08/2013 Not Applicable     Chronic      Problems Resolved During this Admission:    Diagnosis Date Noted Date Resolved POA    PRINCIPAL PROBLEM:  Unstable angina [I20.0] 03/01/2020 03/03/2020 Yes       Discharged Condition: stable    Disposition: Home or Self Care    Follow Up:  Follow-up Information     Cortes Roblero MD. Schedule an appointment as soon as possible for a visit in 3 days.    Specialty:  Family Medicine  Why:  Discharge follow up   Contact information:  [id number]  [place] [address] [phone]             Schedule an appointment as soon as possible for a visit with Frank Gallardo MD.    Specialties:  Cardiology, INTERVENTIONAL CARDIOLOGY  Why:  Keep appt as scheduled   Contact information:  [address]  [place] [address]  [phone]                 Patient Instructions:      Diet diabetic     Activity as tolerated       Significant Diagnostic Studies: Labs:   BMP:   Recent Labs   Lab 03/06/20  1955   GLU  104      K 3.7      CO2 24   BUN 14   CREATININE 1.1   CALCIUM 9.1   , CMP   Recent Labs   Lab 03/06/20 1955      K 3.7      CO2 24      BUN 14   CREATININE 1.1   CALCIUM 9.1   PROT 7.8   ALBUMIN 3.9   BILITOT 0.4   ALKPHOS 78   AST 26   ALT 31   ANIONGAP 10   ESTGFRAFRICA >60   EGFRNONAA >60    and CBC   Recent Labs   Lab 03/06/20 1955   WBC 6.98   HGB 14.9   HCT 41.5          Pending Diagnostic Studies:     None         Medications:  Reconciled Home Medications:      Medication List      START taking these medications    amLODIPine 5 MG tablet  Commonly known as:  NORVASC  Take 1 tablet (5 mg total) by mouth once daily.     aspirin 81 MG EC tablet  Commonly known as:  ECOTRIN  Take 1 tablet (81 mg total) by mouth once daily.     nitroGLYCERIN 0.4 MG SL tablet  Commonly known as:  NITROSTAT  Place 1 tablet (0.4 mg total) under the tongue every 5 (five) minutes as needed for Chest pain. Do not take with Sildenafil        CHANGE how you take these medications    sildenafil 20 mg Tab  Commonly known as:  REVATIO  Take 2 tables by mouth one hour prior to intercourse.  Max 2 per 24 hours  What changed:  Another medication with the same name was removed. Continue taking this medication, and follow the directions you see here.        CONTINUE taking these medications    Accu-Chek Jyotsna Plus Meter Misc  Generic drug:  blood-glucose meter  AS DIRECTED     albuterol-ipratropium 2.5 mg-0.5 mg/3 mL nebulizer solution  Commonly known as:  DUO-NEB  Take 3 mLs by nebulization 2 (two) times daily. Rescue     azelastine 137 mcg (0.1 %) nasal spray  Commonly known as:  ASTELIN  2 sprays (274 mcg total) by Nasal route 2 (two) times daily.     blood sugar diagnostic Strp  Commonly known as:  Accu-Chek Jyotsna Plus test strp  TEST THREE TIMES A DAY     budesonide-formoterol 160-4.5 mcg 160-4.5 mcg/actuation Hfaa  Commonly known as:  Symbicort  INHALE 2 PUFFS INTO THE LUNGS TWO TIMES A DAY    "  CENTRUM SPECIALIST HEART ORAL  Take 1 tablet by mouth once daily.     diclofenac sodium 1 % Gel  Commonly known as:  Voltaren  Apply 2 g topically 4 (four) times daily.     docusate sodium 100 MG capsule  Commonly known as:  COLACE  Take 1 capsule (100 mg total) by mouth 2 (two) times daily.     finasteride 5 mg tablet  Commonly known as:  PROSCAR  Take 1 tablet (5 mg total) by mouth once daily.     Fluad [id number] (65 yr up)(PF) 45 mcg (15 mcg x 3)/0.5 mL Syrg  Generic drug:  flu vac 2019 [id number](PF)     furosemide 40 MG tablet  Commonly known as:  LASIX  Take 1 tablet (40 mg) by mouth 2 times a day and 1 extra if needed for 14 days.     HumaLOG KwikPen Insulin 100 unit/mL pen  Generic drug:  insulin lispro  INJECT 3-4 UNITS INTO THE SKIN THREE TIMES DAILY BEFORE MEALS.     HYDROcodone-acetaminophen  mg per tablet  Commonly known as:  NORCO  Take 1 tablet by mouth every 4 (four) hours as needed for Pain.     insulin glargine 100 units/mL (3mL) SubQ pen  Commonly known as:  Lantus Solostar U-100 Insulin  Inject 40 Units into the skin once daily.     krill oil 500 mg Cap  Take by mouth.     lancets Misc  Commonly known as:  Accu-Chek Fastclix Lancet Drum  TEST TWO TIMES A DAY     levocetirizine 5 MG tablet  Commonly known as:  XYZAL  TAKE ONE TABLET BY MOUTH EVERY EVENING     lisinopril 10 MG tablet  Take 1 tablet (10 mg total) by mouth once daily.     metoprolol succinate 50 MG 24 hr tablet  Commonly known as:  TOPROL-XL  TAKE 1 TABLET BY MOUTH TWO TIMES A DAY     milk thistle 175 mg tablet  Take 175 mg by mouth once daily.     mometasone 50 mcg/actuation nasal spray  Commonly known as:  NASONEX  2 sprays by Nasal route once daily.     montelukast 10 mg tablet  Commonly known as:  SINGULAIR  TAKE ONE TABLET BY MOUTH EVERY DAY     OSTEO BI-FLEX ORAL  Take 1 tablet by mouth 2 (two) times daily.     pen needle, diabetic 31 gauge x 3/16" Ndle  Commonly known as:  BD Ultra-Fine Mini Pen Needle  USE AS " DIRECTED     polyethylene glycol 17 gram/dose powder  Commonly known as:  GLYCOLAX  Take 17 g ( 1 capful) by mouth once daily.     RABEprazole 20 mg tablet  Commonly known as:  ACIPHEX  Take 20 mg by mouth once daily.     Synthroid 137 MCG Tab tablet  Generic drug:  levothyroxine  TAKE 2 TABLETS BY MOUTH BEFORE BREAKFAST.        STOP taking these medications    predniSONE 20 MG tablet  Commonly known as:  DELTASONE            Indwelling Lines/Drains at time of discharge:   Lines/Drains/Airways     None                 Time spent on the discharge of patient: 40 minutes  Patient was seen and examined on the date of discharge and determined to be suitable for discharge.         Agnieszka Keating MD  Department of Hospital Medicine  [facility]

## 2020-03-07 NOTE — ED PROVIDER NOTES
74 year old male patient presents to the ER with elevated blood pressure. Patient was here last week for similar.       Pt understands that a workup will begin in the treatment lounge/results waiting areas due to there being no available beds. Pt also undertstands they will be placed in the next available bed where they will be seen and dispositioned by a physician. I am removing myself from the care of pt. Pt will be assigned to next available physician.      Jj Castillo FNP-C 1929     SCRIBE #1 NOTE: I, Trisha Schrader, am scribing for, and in the presence of, Daryl Arevalo Jr., MD. I have scribed the entire note.       History     Chief Complaint   Patient presents with    Hypertension     c/o headache and blurred vision, states at home elevated BP      Review of patient's allergies indicates:   Allergen Reactions    Lipitor [atorvastatin] Other (See Comments)     Muscle aches and pains as well as fatigue.     Niacin preparations Other (See Comments)     Causes broken blood vessels and bruises    Iodinated contrast media Hives     Tachycardia    Omeprazole Hives and Itching    Prilosec [omeprazole magnesium] Hives and Itching         History of Present Illness     HPI    3/6/2020, 9:37 PM  History obtained from the patient      History of Present Illness: Erendira Pretty is a 74 y.o. male patient with a PMHx of aortic stenosis, asthma, CAD, cirrhosis, COPD, HLD, HTN, and DM who presents to the Emergency Department for evaluation of elevated BP which onset gradually today. Patient has been taking BP multiple times per day and keeping a log. States today it was elevated. Symptoms are constant and moderate in severity. No mitigating or exacerbating factors reported. Associated sxs include HA and blurred vision. Patient denies any fever, chills, CP, palpitations, SOB, leg swelling, n/v, cough, dizziness, lightheadedness, and all other sxs at this time. Patient states he has been compliant with medications.  No further complaints or concerns at this time.       Arrival mode: Personal vehicle    PCP: Cortes Roblero MD        Past Medical History:  Past Medical History:   Diagnosis Date    Aortic stenosis     Asthma     BPH (benign prostatic hyperplasia)     CAD (coronary artery disease)     40% lesion 2002;lazo    Cirrhosis     Claudication 4/9/2014    Colon polyp     COPD (chronic obstructive pulmonary disease)     ED (erectile dysfunction)     Encounter for blood transfusion     Ex-smoker     Hearing loss NEC     Hepatitis C     Cured;reji brown 2015    Hyperlipidemia     Hypertension     Hypothyroid     s/p tx graves    DELONTE on CPAP     Osteoarthritis     PVD (peripheral vascular disease)     Type 2 diabetes mellitus        Past Surgical History:  Past Surgical History:   Procedure Laterality Date    CARDIAC CATHETERIZATION      CARDIAC SURGERY      CARPAL TUNNEL RELEASE Left     CATARACT EXTRACTION      CATARACT EXTRACTION W/ INTRAOCULAR LENS  IMPLANT, BILATERAL  2008    CATHETERIZATION OF BOTH LEFT AND RIGHT HEART N/A 11/2/2018    Procedure: CATHETERIZATION, HEART, BOTH LEFT AND RIGHT;  Surgeon: Frank Lazo MD;  Location: [facility] CATH LAB;  Service: Cardiology;  Laterality: N/A;    COLONOSCOPY  8/2013    COLONOSCOPY N/A 5/30/2016    Procedure: COLONOSCOPY;  Surgeon: Anna Tomas MD;  Location: [facility] ENDO;  Service: Endoscopy;  Laterality: N/A;    COLONOSCOPY N/A 7/17/2019    Procedure: COLONOSCOPY;  Surgeon: David Carter III, MD;  Location: [facility] ENDO;  Service: Endoscopy;  Laterality: N/A;    COMPUTED TOMOGRAPHY N/A 9/9/2019    Procedure: CT (COMPUTED TOMOGRAPHY);  Surgeon: [facility] Diagnostic Provider;  Location: [facility] OR;  Service: General;  Laterality: N/A;  Microwave ablation to be done in CT.  Need CRNA and cart    CORONARY ARTERY BYPASS GRAFT (CABG) N/A 11/5/2018    Procedure: CORONARY ARTERY BYPASS GRAFT (CABG);  Surgeon: Bear De Luna MD;  Location: [facility] OR;   Service: Cardiothoracic;  Laterality: N/A;  TWO VESSEL BYPASS WITH AORTOTOMY AND EXCISION OF ATHEROSCLEROTIC PLAQUE    CORONARY BYPASS GRAFT ANGIOGRAPHY  3/2/2020    Procedure: Bypass graft study;  Surgeon: Cora Cormier MD;  Location: [facility] CATH LAB;  Service: Cardiology;;    ELBOW BURSA SURGERY Right 2010    ENDOSCOPIC HARVEST OF VEIN Left 11/5/2018    Procedure: SURGICAL PROCUREMENT, VEIN, ENDOSCOPIC;  Surgeon: Bear De Luna MD;  Location: [facility] OR;  Service: Cardiothoracic;  Laterality: Left;    ESOPHAGOGASTRODUODENOSCOPY N/A 7/17/2019    Procedure: ESOPHAGOGASTRODUODENOSCOPY (EGD);  Surgeon: David Carter III, MD;  Location: [facility] ENDO;  Service: Endoscopy;  Laterality: N/A;    ETHMOIDECTOMY Bilateral 3/27/2019    Procedure: ETHMOIDECTOMY;  Surgeon: Alejandro Parkinson MD;  Location: [facility] OR;  Service: ENT;  Laterality: Bilateral;    EYE SURGERY      FRONTAL SINUS OBLITERATION Bilateral 3/27/2019    Procedure: SINUSOTOMY, FRONTAL SINUS, OBLITERATIVE;  Surgeon: Alejandro Parkinson MD;  Location: [facility] OR;  Service: ENT;  Laterality: Bilateral;    FUNCTIONAL ENDOSCOPIC SINUS SURGERY (FESS) Bilateral 3/27/2019    Procedure: FESS (FUNCTIONAL ENDOSCOPIC SINUS SURGERY);  Surgeon: Alejandro Parkinson MD;  Location: [facility] OR;  Service: ENT;  Laterality: Bilateral;  Left Keila bullosa resection     KNEE ARTHROSCOPY W/ MENISCAL REPAIR Left 06/2019    dr boykin    KNEE SURGERY Right     LEFT HEART CATHETERIZATION Left 3/2/2020    Procedure: CATHETERIZATION, HEART, LEFT;  Surgeon: Cora Cormier MD;  Location: [facility] CATH LAB;  Service: Cardiology;  Laterality: Left;    MAXILLARY ANTROSTOMY Bilateral 3/27/2019    Procedure: MAXILLARY ANTROSTOMY;  Surgeon: Alejandro Parkinson MD;  Location: [facility] OR;  Service: ENT;  Laterality: Bilateral;    NASAL SEPTOPLASTY N/A 3/27/2019    Procedure: SEPTOPLASTY, NOSE;  Surgeon: Alejandro Parkinson MD;  Location: [facility] OR;  Service: ENT;  Laterality: N/A;    RECTAL SURGERY  2012    TRANSURETHRAL RESECTION OF  PROSTATE (TURP) WITHOUT USE OF LASER N/A 2018    Procedure: TURP, WITHOUT USING LASER;  Surgeon: Cooper Cordova IV, MD;  Location: [facility];  Service: Urology;  Laterality: N/A;    TRIGGER FINGER RELEASE Left          Family History:  Family History   Problem Relation Age of Onset    Heart disease Mother     Heart disease Father     Heart disease Brother     Colon cancer Neg Hx        Social History:  Social History     Tobacco Use    Smoking status: Former Smoker     Packs/day: 0.50     Years: 4.00     Pack years: 2.00     Last attempt to quit: 1972     Years since quittin.7    Smokeless tobacco: Former User     Types: Chew     Quit date: 1976   Substance and Sexual Activity    Alcohol use: Yes     Alcohol/week: 2.0 standard drinks     Types: 2 Cans of beer per week     Comment: daily  No alcohol prior to surgery    Drug use: No    Sexual activity: Yes     Partners: Female        Review of Systems     Review of Systems   Constitutional: Negative for activity change, chills, diaphoresis and fever.        (+) elevated BP   HENT: Negative for congestion, rhinorrhea, sneezing, sore throat and trouble swallowing.    Eyes: Positive for visual disturbance (blurred vision). Negative for pain.   Respiratory: Negative for cough, chest tightness, shortness of breath, wheezing and stridor.    Cardiovascular: Negative for chest pain, palpitations and leg swelling.   Gastrointestinal: Negative for abdominal distention, abdominal pain, constipation, diarrhea, nausea and vomiting.   Genitourinary: Negative for difficulty urinating, dysuria, frequency and urgency.   Musculoskeletal: Negative for arthralgias, back pain, myalgias, neck pain and neck stiffness.   Skin: Negative for pallor, rash and wound.   Neurological: Positive for headaches. Negative for dizziness, syncope, weakness, light-headedness and numbness.   Hematological: Does not bruise/bleed easily.   All other systems reviewed and are  negative.     Physical Exam     Initial Vitals [03/06/20 1835]   BP Pulse Resp Temp SpO2   (!) 191/97 78 18 98.2 °F (36.8 °C) 99 %      MAP       --          Physical Exam  Nursing Notes and Vital Signs Reviewed.  Constitutional: Patient is in no acute distress. Well-developed and well-nourished.  Head: Atraumatic. Normocephalic.  Eyes: PERRL. EOM intact. Conjunctivae are not pale. No scleral icterus.  ENT: Mucous membranes are moist. Oropharynx is clear and symmetric.    Neck: Supple. Full ROM. No lymphadenopathy.  Cardiovascular: Regular rate. Regular rhythm. No murmurs, rubs, or gallops. Distal pulses are 2+ and symmetric.  Pulmonary/Chest: No respiratory distress. Clear to auscultation bilaterally. No wheezing or rales.  Abdominal: Soft and non-distended.  There is no tenderness.  No rebound, guarding, or rigidity. Good bowel sounds.  Genitourinary: No CVA tenderness  Musculoskeletal: Moves all extremities. No obvious deformities. No edema. No calf tenderness.  Skin: Warm and dry.  Neurological:  Alert, awake, and appropriate.  Normal speech.  No acute focal neurological deficits are appreciated.  Psychiatric: Normal affect. Good eye contact. Appropriate in content.     ED Course   Procedures  ED Vital Signs:  Vitals:    03/06/20 1835 03/06/20 2129 03/06/20 2134 03/06/20 2234   BP: (!) 191/97  (!) 185/87 (!) 170/85   Pulse: 78 75 84 61   Resp: 18  20 20   Temp: 98.2 °F (36.8 °C)  98.3 °F (36.8 °C)    TempSrc: Oral  Oral    SpO2: 99%  96% 96%   Weight: 113.7 kg (250 lb 12.4 oz)       03/06/20 2237   BP: (!) 150/87   Pulse: 73   Resp: 18   Temp:    TempSrc:    SpO2: 96%   Weight:        Abnormal Lab Results:  Labs Reviewed   CBC W/ AUTO DIFFERENTIAL - Abnormal; Notable for the following components:       Result Value    RBC 4.49 (*)     Mean Corpuscular Hemoglobin 33.2 (*)     Immature Granulocytes 0.7 (*)     Immature Grans (Abs) 0.05 (*)     All other components within normal limits   COMPREHENSIVE METABOLIC  PANEL   TROPONIN I   B-TYPE NATRIURETIC PEPTIDE        All Lab Results:  Results for orders placed or performed during the hospital encounter of 03/06/20   CBC auto differential   Result Value Ref Range    WBC 6.98 3.90 - 12.70 K/uL    RBC 4.49 (L) 4.60 - 6.20 M/uL    Hemoglobin 14.9 14.0 - 18.0 g/dL    Hematocrit 41.5 40.0 - 54.0 %    Mean Corpuscular Volume 92 82 - 98 fL    Mean Corpuscular Hemoglobin 33.2 (H) 27.0 - 31.0 pg    Mean Corpuscular Hemoglobin Conc 35.9 32.0 - 36.0 g/dL    RDW 12.2 11.5 - 14.5 %    Platelets 166 150 - 350 K/uL    MPV 9.8 9.2 - 12.9 fL    Immature Granulocytes 0.7 (H) 0.0 - 0.5 %    Gran # (ANC) 4.2 1.8 - 7.7 K/uL    Immature Grans (Abs) 0.05 (H) 0.00 - 0.04 K/uL    Lymph # 2.0 1.0 - 4.8 K/uL    Mono # 0.5 0.3 - 1.0 K/uL    Eos # 0.1 0.0 - 0.5 K/uL    Baso # 0.04 0.00 - 0.20 K/uL    nRBC 0 0 /100 WBC    Gran% 59.9 38.0 - 73.0 %    Lymph% 29.1 18.0 - 48.0 %    Mono% 7.7 4.0 - 15.0 %    Eosinophil% 2.0 0.0 - 8.0 %    Basophil% 0.6 0.0 - 1.9 %    Differential Method Automated    Comprehensive metabolic panel   Result Value Ref Range    Sodium 136 136 - 145 mmol/L    Potassium 3.7 3.5 - 5.1 mmol/L    Chloride 102 95 - 110 mmol/L    CO2 24 23 - 29 mmol/L    Glucose 104 70 - 110 mg/dL    BUN, Bld 14 8 - 23 mg/dL    Creatinine 1.1 0.5 - 1.4 mg/dL    Calcium 9.1 8.7 - 10.5 mg/dL    Total Protein 7.8 6.0 - 8.4 g/dL    Albumin 3.9 3.5 - 5.2 g/dL    Total Bilirubin 0.4 0.1 - 1.0 mg/dL    Alkaline Phosphatase 78 55 - 135 U/L    AST 26 10 - 40 U/L    ALT 31 10 - 44 U/L    Anion Gap 10 8 - 16 mmol/L    eGFR if African American >60 >60 mL/min/1.73 m^2    eGFR if non African American >60 >60 mL/min/1.73 m^2   Troponin I #1   Result Value Ref Range    Troponin I 0.018 0.000 - 0.026 ng/mL   B-Type natriuretic peptide (BNP)   Result Value Ref Range    BNP 67 0 - 99 pg/mL     *Note: Due to a large number of results and/or encounters for the requested time period, some results have not been displayed. A  complete set of results can be found in Results Review.            The Emergency Provider reviewed the vital signs and test results, which are outlined above.     ED Discussion     10:39 PM: Reassessed pt at this time. Discussed with pt all pertinent ED information and results. Discussed pt dx and plan of tx. Gave pt all f/u and return to the ED instructions. All questions and concerns were addressed at this time. Pt expresses understanding of information and instructions, and is comfortable with plan to discharge. Pt is stable for discharge.    I discussed with patient and/or family/caretaker that evaluation in the ED does not suggest any emergent or life threatening medical conditions requiring immediate intervention beyond what was provided in the ED, and I believe patient is safe for discharge. Regardless, an unremarkable evaluation in the ED does not preclude the development or presence of a serious of life threatening condition. As such, patient was instructed to return immediately for any worsening or change in current symptoms.    10: 45 PM Patient's headache is controlled in his blood pressure is well controlled.  Patient is stable safe for discharge in my opinion.  Discussed with the patient all findings well as plan of care.  Patient verbalized understanding agreement with all seems reliable.  He is safe for discharge in my opinion.         Medical Decision Making:   Clinical Tests:   Lab Tests: Ordered and Reviewed           ED Medication(s):  Medications   labetalol injection 20 mg (20 mg Intravenous Given 3/6/20 2153)   ketorolac injection 15 mg (15 mg Intravenous Given 3/6/20 2155)   promethazine (PHENERGAN) 12.5 mg in dextrose 5 % 50 mL IVPB (12.5 mg Intravenous New Bag 3/6/20 2155)       New Prescriptions    BUTALBITAL-ACETAMINOPHEN-CAFFEINE -40 MG (FIORICET, ESGIC) -40 MG PER TABLET    Take 1 tablet by mouth every 6 (six) hours as needed for Pain.          Follow-up Information     Cortes  TORY Roblero MD In 3 days.    Specialty:  Family Medicine  Contact information:  [address]  [place] [address]  [phone]                     Scribe Attestation:   Scribe #1: I performed the above scribed service and the documentation accurately describes the services I performed. I attest to the accuracy of the note.     Attending:   Physician Attestation Statement for Scribe #1: I, Daryl Arevalo Jr., MD, personally performed the services described in this documentation, as scribed by Trisha Schrader, in my presence, and it is both accurate and complete.           Clinical Impression       ICD-10-CM ICD-9-CM   1. Hypertensive urgency I16.0 401.9   2. Hypertension I10 401.9   3. Nonintractable headache, unspecified chronicity pattern, unspecified headache type R51 784.0       Disposition:   Disposition: Discharged  Condition: Stable         Daryl Arevalo Jr., MD  03/06/20 2425

## 2020-03-09 ENCOUNTER — OFFICE VISIT (OUTPATIENT)
Dept: CARDIOLOGY | Facility: CLINIC | Age: 75
End: 2020-03-09
Payer: MEDICARE

## 2020-03-09 ENCOUNTER — OFFICE VISIT (OUTPATIENT)
Dept: INTERNAL MEDICINE | Facility: CLINIC | Age: 75
End: 2020-03-09
Payer: MEDICARE

## 2020-03-09 VITALS
HEIGHT: 68 IN | BODY MASS INDEX: 37.52 KG/M2 | DIASTOLIC BLOOD PRESSURE: 70 MMHG | WEIGHT: 247.56 LBS | SYSTOLIC BLOOD PRESSURE: 116 MMHG | HEART RATE: 73 BPM | OXYGEN SATURATION: 96 %

## 2020-03-09 VITALS
HEART RATE: 71 BPM | SYSTOLIC BLOOD PRESSURE: 130 MMHG | DIASTOLIC BLOOD PRESSURE: 60 MMHG | HEIGHT: 68 IN | WEIGHT: 246.94 LBS | TEMPERATURE: 98 F | BODY MASS INDEX: 37.43 KG/M2 | OXYGEN SATURATION: 97 %

## 2020-03-09 DIAGNOSIS — I49.3 PVC (PREMATURE VENTRICULAR CONTRACTION): Primary | ICD-10-CM

## 2020-03-09 DIAGNOSIS — K74.60 CIRRHOSIS OF LIVER WITHOUT ASCITES, UNSPECIFIED HEPATIC CIRRHOSIS TYPE: ICD-10-CM

## 2020-03-09 DIAGNOSIS — E03.9 ACQUIRED HYPOTHYROIDISM: ICD-10-CM

## 2020-03-09 DIAGNOSIS — I10 ESSENTIAL HYPERTENSION: Chronic | ICD-10-CM

## 2020-03-09 DIAGNOSIS — G47.33 OSA ON CPAP: Chronic | ICD-10-CM

## 2020-03-09 DIAGNOSIS — I25.10 CORONARY ARTERY DISEASE INVOLVING NATIVE CORONARY ARTERY OF NATIVE HEART WITHOUT ANGINA PECTORIS: Chronic | ICD-10-CM

## 2020-03-09 DIAGNOSIS — Z95.1 S/P CABG X 2: ICD-10-CM

## 2020-03-09 DIAGNOSIS — I73.9 PVD (PERIPHERAL VASCULAR DISEASE): ICD-10-CM

## 2020-03-09 DIAGNOSIS — J45.909 UNCOMPLICATED ASTHMA, UNSPECIFIED ASTHMA SEVERITY, UNSPECIFIED WHETHER PERSISTENT: ICD-10-CM

## 2020-03-09 DIAGNOSIS — Z79.4 TYPE 2 DIABETES MELLITUS WITH CHRONIC KIDNEY DISEASE, WITH LONG-TERM CURRENT USE OF INSULIN, UNSPECIFIED CKD STAGE: Primary | Chronic | ICD-10-CM

## 2020-03-09 DIAGNOSIS — I20.9 ANGINA PECTORIS: ICD-10-CM

## 2020-03-09 DIAGNOSIS — C22.0 HCC (HEPATOCELLULAR CARCINOMA): ICD-10-CM

## 2020-03-09 DIAGNOSIS — E11.22 TYPE 2 DIABETES MELLITUS WITH CHRONIC KIDNEY DISEASE, WITH LONG-TERM CURRENT USE OF INSULIN, UNSPECIFIED CKD STAGE: Primary | Chronic | ICD-10-CM

## 2020-03-09 PROCEDURE — 1101F PR PT FALLS ASSESS DOC 0-1 FALLS W/OUT INJ PAST YR: ICD-10-PCS | Mod: CPTII,S$GLB,, | Performed by: INTERNAL MEDICINE

## 2020-03-09 PROCEDURE — 3074F PR MOST RECENT SYSTOLIC BLOOD PRESSURE < 130 MM HG: ICD-10-PCS | Mod: CPTII,S$GLB,, | Performed by: INTERNAL MEDICINE

## 2020-03-09 PROCEDURE — 99999 PR PBB SHADOW E&M-EST. PATIENT-LVL IV: CPT | Mod: PBBFAC,,, | Performed by: FAMILY MEDICINE

## 2020-03-09 PROCEDURE — 99999 PR PBB SHADOW E&M-EST. PATIENT-LVL IV: ICD-10-PCS | Mod: PBBFAC,,, | Performed by: FAMILY MEDICINE

## 2020-03-09 PROCEDURE — 3074F SYST BP LT 130 MM HG: CPT | Mod: CPTII,S$GLB,, | Performed by: INTERNAL MEDICINE

## 2020-03-09 PROCEDURE — 3078F DIAST BP <80 MM HG: CPT | Mod: CPTII,S$GLB,, | Performed by: INTERNAL MEDICINE

## 2020-03-09 PROCEDURE — 3051F PR MOST RECENT HEMOGLOBIN A1C LEVEL 7.0 - < 8.0%: ICD-10-PCS | Mod: CPTII,S$GLB,, | Performed by: FAMILY MEDICINE

## 2020-03-09 PROCEDURE — 3078F DIAST BP <80 MM HG: CPT | Mod: CPTII,S$GLB,, | Performed by: FAMILY MEDICINE

## 2020-03-09 PROCEDURE — 3075F SYST BP GE 130 - 139MM HG: CPT | Mod: CPTII,S$GLB,, | Performed by: FAMILY MEDICINE

## 2020-03-09 PROCEDURE — 1159F MED LIST DOCD IN RCRD: CPT | Mod: S$GLB,,, | Performed by: FAMILY MEDICINE

## 2020-03-09 PROCEDURE — 3051F HG A1C>EQUAL 7.0%<8.0%: CPT | Mod: CPTII,S$GLB,, | Performed by: FAMILY MEDICINE

## 2020-03-09 PROCEDURE — 99999 PR PBB SHADOW E&M-EST. PATIENT-LVL III: ICD-10-PCS | Mod: PBBFAC,,, | Performed by: INTERNAL MEDICINE

## 2020-03-09 PROCEDURE — 99999 PR PBB SHADOW E&M-EST. PATIENT-LVL III: CPT | Mod: PBBFAC,,, | Performed by: INTERNAL MEDICINE

## 2020-03-09 PROCEDURE — 1101F PT FALLS ASSESS-DOCD LE1/YR: CPT | Mod: CPTII,S$GLB,, | Performed by: FAMILY MEDICINE

## 2020-03-09 PROCEDURE — 3075F PR MOST RECENT SYSTOLIC BLOOD PRESS GE 130-139MM HG: ICD-10-PCS | Mod: CPTII,S$GLB,, | Performed by: FAMILY MEDICINE

## 2020-03-09 PROCEDURE — 99214 OFFICE O/P EST MOD 30 MIN: CPT | Mod: S$GLB,,, | Performed by: INTERNAL MEDICINE

## 2020-03-09 PROCEDURE — 1159F PR MEDICATION LIST DOCUMENTED IN MEDICAL RECORD: ICD-10-PCS | Mod: S$GLB,,, | Performed by: INTERNAL MEDICINE

## 2020-03-09 PROCEDURE — 99214 OFFICE O/P EST MOD 30 MIN: CPT | Mod: S$GLB,,, | Performed by: FAMILY MEDICINE

## 2020-03-09 PROCEDURE — 3078F PR MOST RECENT DIASTOLIC BLOOD PRESSURE < 80 MM HG: ICD-10-PCS | Mod: CPTII,S$GLB,, | Performed by: INTERNAL MEDICINE

## 2020-03-09 PROCEDURE — 1101F PT FALLS ASSESS-DOCD LE1/YR: CPT | Mod: CPTII,S$GLB,, | Performed by: INTERNAL MEDICINE

## 2020-03-09 PROCEDURE — 3078F PR MOST RECENT DIASTOLIC BLOOD PRESSURE < 80 MM HG: ICD-10-PCS | Mod: CPTII,S$GLB,, | Performed by: FAMILY MEDICINE

## 2020-03-09 PROCEDURE — 1159F PR MEDICATION LIST DOCUMENTED IN MEDICAL RECORD: ICD-10-PCS | Mod: S$GLB,,, | Performed by: FAMILY MEDICINE

## 2020-03-09 PROCEDURE — 99214 PR OFFICE/OUTPT VISIT, EST, LEVL IV, 30-39 MIN: ICD-10-PCS | Mod: S$GLB,,, | Performed by: INTERNAL MEDICINE

## 2020-03-09 PROCEDURE — 1101F PR PT FALLS ASSESS DOC 0-1 FALLS W/OUT INJ PAST YR: ICD-10-PCS | Mod: CPTII,S$GLB,, | Performed by: FAMILY MEDICINE

## 2020-03-09 PROCEDURE — 1159F MED LIST DOCD IN RCRD: CPT | Mod: S$GLB,,, | Performed by: INTERNAL MEDICINE

## 2020-03-09 PROCEDURE — 99214 PR OFFICE/OUTPT VISIT, EST, LEVL IV, 30-39 MIN: ICD-10-PCS | Mod: S$GLB,,, | Performed by: FAMILY MEDICINE

## 2020-03-09 RX ORDER — INSULIN GLARGINE 100 [IU]/ML
44 INJECTION, SOLUTION SUBCUTANEOUS NIGHTLY
Qty: 5 SYRINGE | Refills: 6 | Status: SHIPPED | OUTPATIENT
Start: 2020-03-09 | End: 2020-10-26

## 2020-03-09 NOTE — PROGRESS NOTES
Subjective:   Patient ID:  Erendira Pretty is a 74 y.o. male who presents for follow-up of Follow-up  Pt is s/p hospitilization- cath- unremarkable. Pt with labile BP. Pt seen in ER 2 days ago for high BP. Amlodipine increased.    Hypertension   This is a chronic problem. The current episode started more than 1 year ago. The problem has been gradually improving since onset. The problem is controlled. Pertinent negatives include no chest pain, palpitations or shortness of breath. Past treatments include diuretics, calcium channel blockers and beta blockers. The current treatment provides moderate improvement. There are no compliance problems.    Follow-up   This is a recurrent problem. The current episode started 1 to 4 weeks ago. The problem occurs intermittently. The problem has been waxing and waning. Pertinent negatives include no chest pain. Nothing aggravates the symptoms. Treatments tried: BP control. The treatment provided moderate relief.   Hyperlipidemia   This is a recurrent problem. The current episode started more than 1 year ago. Pertinent negatives include no chest pain or shortness of breath. He is currently on no antihyperlipidemic treatment. The current treatment provides mild improvement of lipids. Compliance problems include medication side effects.        Review of Systems   Constitution: Negative. Negative for weight gain.   HENT: Negative.    Eyes: Negative.    Cardiovascular: Negative.  Negative for chest pain, leg swelling and palpitations.   Respiratory: Negative.  Negative for shortness of breath.    Endocrine: Negative.    Hematologic/Lymphatic: Negative.    Skin: Negative.    Musculoskeletal: Negative for muscle weakness.   Gastrointestinal: Negative.    Genitourinary: Negative.    Neurological: Negative.  Negative for dizziness.   Psychiatric/Behavioral: Negative.    Allergic/Immunologic: Negative.      Family History   Problem Relation Age of Onset    Heart disease Mother     Heart  disease Father     Heart disease Brother     Colon cancer Neg Hx      Past Medical History:   Diagnosis Date    Aortic stenosis     Asthma     BPH (benign prostatic hyperplasia)     CAD (coronary artery disease)     40% lesion ;lazo    Cirrhosis     Claudication 2014    Colon polyp     COPD (chronic obstructive pulmonary disease)     ED (erectile dysfunction)     Encounter for blood transfusion     Ex-smoker     Hearing loss NEC     Hepatitis C     Cured;reji brown     Hyperlipidemia     Hypertension     Hypothyroid     s/p tx graves    DELONTE on CPAP     Osteoarthritis     PVD (peripheral vascular disease)     Type 2 diabetes mellitus      Social History     Socioeconomic History    Marital status:      Spouse name: YAEL    Number of children: 2    Years of education: Not on file    Highest education level: Not on file   Occupational History    Not on file   Social Needs    Financial resource strain: Not on file    Food insecurity:     Worry: Not on file     Inability: Not on file    Transportation needs:     Medical: Not on file     Non-medical: Not on file   Tobacco Use    Smoking status: Former Smoker     Packs/day: 0.50     Years: 4.00     Pack years: 2.00     Last attempt to quit: 1972     Years since quittin.7    Smokeless tobacco: Former User     Types: Chew     Quit date: 1976   Substance and Sexual Activity    Alcohol use: Yes     Alcohol/week: 2.0 standard drinks     Types: 2 Cans of beer per week     Comment: daily  No alcohol prior to surgery    Drug use: No    Sexual activity: Yes     Partners: Female   Lifestyle    Physical activity:     Days per week: Not on file     Minutes per session: Not on file    Stress: Not on file   Relationships    Social connections:     Talks on phone: Not on file     Gets together: Not on file     Attends Anabaptist service: Not on file     Active member of club or organization: Not on file      Attends meetings of clubs or organizations: Not on file     Relationship status: Not on file   Other Topics Concern    Not on file   Social History Narrative    . . 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     Current Outpatient Medications on File Prior to Visit   Medication Sig Dispense Refill    albuterol-ipratropium (DUO-NEB) 2.5 mg-0.5 mg/3 mL nebulizer solution Take 3 mLs by nebulization 2 (two) times daily. Rescue 120 vial 5    amLODIPine (NORVASC) 5 MG tablet Take 1 tablet (5 mg total) by mouth once daily. 30 tablet 0    aspirin (ECOTRIN) 81 MG EC tablet Take 1 tablet (81 mg total) by mouth once daily. 30 tablet 0    budesonide-formoterol 160-4.5 mcg (SYMBICORT) 160-4.5 mcg/actuation HFAA INHALE 2 PUFFS INTO THE LUNGS TWO TIMES A DAY 10.2 g 11    furosemide (LASIX) 40 MG tablet Take 1 tablet (40 mg) by mouth 2 times a day and 1 extra if needed for 14 days. 90 tablet 5    HUMALOG KWIKPEN INSULIN 100 unit/mL pen INJECT 3-4 UNITS INTO THE SKIN THREE TIMES DAILY BEFORE MEALS. 15 mL 11    insulin (LANTUS SOLOSTAR U-100 INSULIN) glargine 100 units/mL (3mL) SubQ pen Inject 44 Units into the skin every evening. 5 Syringe 6    krill oil 500 mg Cap Take by mouth.      lancets (ACCU-CHEK FASTCLIX LANCET DRUM) Misc TEST TWO TIMES A  each 5    lisinopril 10 MG tablet Take 1 tablet (10 mg total) by mouth once daily. 30 tablet 6    metoprolol succinate (TOPROL-XL) 50 MG 24 hr tablet TAKE 1 TABLET BY MOUTH TWO TIMES A DAY 60 tablet 11    mometasone (NASONEX) 50 mcg/actuation nasal spray 2 sprays by Nasal route once daily. 17 g 12    nitroGLYCERIN (NITROSTAT) 0.4 MG SL tablet Place 1 tablet (0.4 mg total) under the tongue every 5 (five) minutes as needed for Chest pain. Do not take with Sildenafil 45 tablet 0    RABEprazole (ACIPHEX) 20 mg tablet Take 20 mg by mouth once daily.      SYNTHROID 137 mcg Tab tablet TAKE 2 TABLETS BY  "MOUTH BEFORE BREAKFAST. 60 tablet 5    ACCU-CHEK GRACE PLUS METER Misc AS DIRECTED 1 each 0    blood sugar diagnostic (ACCU-CHEK GRACE PLUS TEST STRP) Strp TEST THREE TIMES A  strip 11    GLUCOSAMINE HCL/CHONDR ROSARIO A NA (OSTEO BI-FLEX ORAL) Take 1 tablet by mouth 2 (two) times daily.       pen needle, diabetic (BD INSULIN PEN NEEDLE UF MINI) 31 gauge x 3/16" Ndle USE AS DIRECTED 100 each 11    [DISCONTINUED] azelastine (ASTELIN) 137 mcg (0.1 %) nasal spray 2 sprays (274 mcg total) by Nasal route 2 (two) times daily. 30 mL 6    [DISCONTINUED] butalbital-acetaminophen-caffeine -40 mg (FIORICET, ESGIC) -40 mg per tablet Take 1 tablet by mouth every 6 (six) hours as needed for Pain. 7 tablet 0    [DISCONTINUED] diclofenac sodium (VOLTAREN) 1 % Gel Apply 2 g topically 4 (four) times daily. (Patient not taking: Reported on 3/5/2020) 100 g 5    [DISCONTINUED] docusate sodium (COLACE) 100 MG capsule Take 1 capsule (100 mg total) by mouth 2 (two) times daily. (Patient not taking: Reported on 3/5/2020) 60 capsule 0    [DISCONTINUED] finasteride (PROSCAR) 5 mg tablet Take 1 tablet (5 mg total) by mouth once daily. (Patient not taking: Reported on 3/5/2020) 30 tablet 11    [DISCONTINUED] FLUAD [id number], 65 YR UP,,PF, 45 mcg (15 mcg x 3)/0.5 mL Syrg       [DISCONTINUED] HYDROcodone-acetaminophen (NORCO)  mg per tablet Take 1 tablet by mouth every 4 (four) hours as needed for Pain. 28 tablet 0    [DISCONTINUED] insulin (LANTUS SOLOSTAR U-100 INSULIN) glargine 100 units/mL (3mL) SubQ pen Inject 40 Units into the skin once daily. 5 Syringe 6    [DISCONTINUED] levocetirizine (XYZAL) 5 MG tablet TAKE ONE TABLET BY MOUTH EVERY EVENING 30 tablet 11    [DISCONTINUED] milk thistle 175 mg tablet Take 175 mg by mouth once daily.      [DISCONTINUED] montelukast (SINGULAIR) 10 mg tablet TAKE ONE TABLET BY MOUTH EVERY DAY 30 tablet 11    [DISCONTINUED] MV,CA,IRON,MIN/FA/PHYTOSTEROL (CENTRUM SPECIALIST " HEART ORAL) Take 1 tablet by mouth once daily.       [DISCONTINUED] polyethylene glycol (GLYCOLAX) 17 gram/dose powder Take 17 g ( 1 capful) by mouth once daily. (Patient not taking: Reported on 3/5/2020) 527 g 0    [DISCONTINUED] sildenafil (REVATIO) 20 mg Tab Take 2 tables by mouth one hour prior to intercourse.  Max 2 per 24 hours 30 tablet 3     Current Facility-Administered Medications on File Prior to Visit   Medication Dose Route Frequency Provider Last Rate Last Dose    lactated ringers infusion   Intravenous Continuous Omaira Theodore MD         Review of patient's allergies indicates:   Allergen Reactions    Lipitor [atorvastatin] Other (See Comments)     Muscle aches and pains as well as fatigue.     Niacin preparations Other (See Comments)     Causes broken blood vessels and bruises    Iodinated contrast media Hives     Tachycardia    Omeprazole Hives and Itching    Prilosec [omeprazole magnesium] Hives and Itching       Objective:     Physical Exam   Constitutional: He is oriented to person, place, and time. He appears well-developed and well-nourished.   HENT:   Head: Normocephalic and atraumatic.   Eyes: Pupils are equal, round, and reactive to light. Conjunctivae are normal.   Neck: Normal range of motion. Neck supple.   Cardiovascular: Normal rate, regular rhythm, normal heart sounds and intact distal pulses.   Pulmonary/Chest: Effort normal and breath sounds normal.   Abdominal: Soft. Bowel sounds are normal.   Neurological: He is alert and oriented to person, place, and time.   Skin: Skin is warm and dry.   Psychiatric: He has a normal mood and affect.   Nursing note and vitals reviewed.      Assessment:     1. PVC (premature ventricular contraction)    2. PVD (peripheral vascular disease)    3. Angina pectoris    4. S/P CABG x 2    5. Coronary artery disease involving native coronary artery of native heart without angina pectoris    6. DELONTE on CPAP        Plan:     PVC (premature  ventricular contraction)    PVD (peripheral vascular disease)    Angina pectoris    S/P CABG x 2    Coronary artery disease involving native coronary artery of native heart without angina pectoris    DELONTE on CPAP    continue amlodipine, lasix, lisinopril-htn  Continue asa- cad

## 2020-03-10 RX ORDER — AMLODIPINE BESYLATE 10 MG/1
10 TABLET ORAL DAILY
COMMUNITY
End: 2020-03-10

## 2020-03-10 RX ORDER — AMLODIPINE BESYLATE 10 MG/1
10 TABLET ORAL DAILY
Qty: 30 TABLET | Refills: 11 | Status: SHIPPED | OUTPATIENT
Start: 2020-03-10 | End: 2021-02-26

## 2020-03-10 NOTE — TELEPHONE ENCOUNTER
----- Message from Elba Monsivais sent at 3/10/2020  9:18 AM CDT -----  Type:  Needs Medical Advice    Who Called:  Pt  Erendira  Symptoms (please be specific):    Elevated blood pressure  How long has patient had these symptoms:    Pharmacy name and phone #:   [facility]  Would the patient rather a call back or a response via [facility]?  Gregor back  Best Call Back Number:   [phone]  Additional Information:  Pt states [place] [facility] [place]6-20//for elevated blood pressure//one of his meds was increased//med is Amlodipine/besylate//he was taking 5mg and med was increased to 10mg//he is needing a new prescription sent in for 10mg//please call//thanks/lh

## 2020-03-11 ENCOUNTER — OFFICE VISIT (OUTPATIENT)
Dept: GASTROENTEROLOGY | Facility: CLINIC | Age: 75
End: 2020-03-11
Payer: MEDICARE

## 2020-03-11 VITALS
HEIGHT: 68 IN | BODY MASS INDEX: 38.06 KG/M2 | SYSTOLIC BLOOD PRESSURE: 130 MMHG | DIASTOLIC BLOOD PRESSURE: 82 MMHG | WEIGHT: 251.13 LBS | HEART RATE: 81 BPM

## 2020-03-11 DIAGNOSIS — C22.0 HCC (HEPATOCELLULAR CARCINOMA): Primary | ICD-10-CM

## 2020-03-11 DIAGNOSIS — D49.0 LIVER NEOPLASM: ICD-10-CM

## 2020-03-11 DIAGNOSIS — K74.69 OTHER CIRRHOSIS OF LIVER: ICD-10-CM

## 2020-03-11 PROCEDURE — 3075F SYST BP GE 130 - 139MM HG: CPT | Mod: CPTII,S$GLB,, | Performed by: INTERNAL MEDICINE

## 2020-03-11 PROCEDURE — 3079F PR MOST RECENT DIASTOLIC BLOOD PRESSURE 80-89 MM HG: ICD-10-PCS | Mod: CPTII,S$GLB,, | Performed by: INTERNAL MEDICINE

## 2020-03-11 PROCEDURE — 3079F DIAST BP 80-89 MM HG: CPT | Mod: CPTII,S$GLB,, | Performed by: INTERNAL MEDICINE

## 2020-03-11 PROCEDURE — 99999 PR PBB SHADOW E&M-EST. PATIENT-LVL III: CPT | Mod: PBBFAC,,, | Performed by: INTERNAL MEDICINE

## 2020-03-11 PROCEDURE — 3075F PR MOST RECENT SYSTOLIC BLOOD PRESS GE 130-139MM HG: ICD-10-PCS | Mod: CPTII,S$GLB,, | Performed by: INTERNAL MEDICINE

## 2020-03-11 PROCEDURE — 99999 PR PBB SHADOW E&M-EST. PATIENT-LVL III: ICD-10-PCS | Mod: PBBFAC,,, | Performed by: INTERNAL MEDICINE

## 2020-03-11 PROCEDURE — 1126F AMNT PAIN NOTED NONE PRSNT: CPT | Mod: S$GLB,,, | Performed by: INTERNAL MEDICINE

## 2020-03-11 PROCEDURE — 99213 PR OFFICE/OUTPT VISIT, EST, LEVL III, 20-29 MIN: ICD-10-PCS | Mod: S$GLB,,, | Performed by: INTERNAL MEDICINE

## 2020-03-11 PROCEDURE — 1159F PR MEDICATION LIST DOCUMENTED IN MEDICAL RECORD: ICD-10-PCS | Mod: S$GLB,,, | Performed by: INTERNAL MEDICINE

## 2020-03-11 PROCEDURE — 1101F PT FALLS ASSESS-DOCD LE1/YR: CPT | Mod: CPTII,S$GLB,, | Performed by: INTERNAL MEDICINE

## 2020-03-11 PROCEDURE — 1159F MED LIST DOCD IN RCRD: CPT | Mod: S$GLB,,, | Performed by: INTERNAL MEDICINE

## 2020-03-11 PROCEDURE — 99213 OFFICE O/P EST LOW 20 MIN: CPT | Mod: S$GLB,,, | Performed by: INTERNAL MEDICINE

## 2020-03-11 PROCEDURE — 1126F PR PAIN SEVERITY QUANTIFIED, NO PAIN PRESENT: ICD-10-PCS | Mod: S$GLB,,, | Performed by: INTERNAL MEDICINE

## 2020-03-11 PROCEDURE — 1101F PR PT FALLS ASSESS DOC 0-1 FALLS W/OUT INJ PAST YR: ICD-10-PCS | Mod: CPTII,S$GLB,, | Performed by: INTERNAL MEDICINE

## 2020-03-11 NOTE — PROGRESS NOTES
Subjective:     Erendira Pretty is here for follow up of cirrhosis    HPI  Since Erendira Pretty's last visit his wife passed away.  He has been struggling with this.  He also had a going to hospital couple times for hypertensive urgency.  He thinks this is related to his recent stress with his wife dying.  Otherwise, overall he feels well.    No evidence of liver decompensation: no ascites, confusion or GI bleeding.    HCC s/p ablation 9/2019      Review of Systems    Objective:     Physical Exam   Constitutional: He is oriented to person, place, and time. He appears well-developed and well-nourished. No distress.   HENT:   Head: Normocephalic and atraumatic.   Mouth/Throat: Oropharynx is clear and moist. No oropharyngeal exudate.   Eyes: Pupils are equal, round, and reactive to light. Conjunctivae are normal. Right eye exhibits no discharge. Left eye exhibits no discharge. No scleral icterus.   Pulmonary/Chest: Effort normal and breath sounds normal. No respiratory distress. He has no wheezes.   Abdominal: Soft. He exhibits no distension. There is no tenderness.   Musculoskeletal: He exhibits edema (Trace bilateral lower extremity edema).   Neurological: He is alert and oriented to person, place, and time.   Psychiatric: He has a normal mood and affect. His behavior is normal.   Vitals reviewed.      MELD-Na score: 8 at 3/3/2020  6:24 AM  MELD score: 8 at 3/3/2020  6:24 AM  Calculated from:  Serum Creatinine: 1.2 mg/dL at 3/3/2020  6:24 AM  Serum Sodium: 138 mmol/L (Rounded to 137 mmol/L) at 3/3/2020  6:24 AM  Total Bilirubin: 0.4 mg/dL (Rounded to 1 mg/dL) at 3/1/2020  6:41 PM  INR(ratio): 1.0 at 3/2/2020  9:16 AM  Age: 74 years    WBC   Date Value Ref Range Status   03/06/2020 6.98 3.90 - 12.70 K/uL Final     Hemoglobin   Date Value Ref Range Status   03/06/2020 14.9 14.0 - 18.0 g/dL Final     POC Hematocrit   Date Value Ref Range Status   11/05/2018 26 (L) 36 - 54 %PCV Final     Hematocrit   Date Value Ref  Range Status   03/06/2020 41.5 40.0 - 54.0 % Final     Platelets   Date Value Ref Range Status   03/06/2020 166 150 - 350 K/uL Final     BUN, Bld   Date Value Ref Range Status   03/06/2020 14 8 - 23 mg/dL Final     Creatinine   Date Value Ref Range Status   03/06/2020 1.1 0.5 - 1.4 mg/dL Final     Glucose   Date Value Ref Range Status   03/06/2020 104 70 - 110 mg/dL Final     Calcium   Date Value Ref Range Status   03/06/2020 9.1 8.7 - 10.5 mg/dL Final     Sodium   Date Value Ref Range Status   03/06/2020 136 136 - 145 mmol/L Final     Potassium   Date Value Ref Range Status   03/06/2020 3.7 3.5 - 5.1 mmol/L Final     Chloride   Date Value Ref Range Status   03/06/2020 102 95 - 110 mmol/L Final     Magnesium   Date Value Ref Range Status   11/08/2018 2.1 1.6 - 2.6 mg/dL Final     AST   Date Value Ref Range Status   03/06/2020 26 10 - 40 U/L Final     ALT   Date Value Ref Range Status   03/06/2020 31 10 - 44 U/L Final     Alkaline Phosphatase   Date Value Ref Range Status   03/06/2020 78 55 - 135 U/L Final     Total Bilirubin   Date Value Ref Range Status   03/06/2020 0.4 0.1 - 1.0 mg/dL Final     Comment:     For infants and newborns, interpretation of results should be based  on gestational age, weight and in agreement with clinical  observations.  Premature Infant recommended reference ranges:  Up to 24 hours.............<8.0 mg/dL  Up to 48 hours............<12.0 mg/dL  3-5 days..................<15.0 mg/dL  6-29 days.................<15.0 mg/dL       Albumin   Date Value Ref Range Status   03/06/2020 3.9 3.5 - 5.2 g/dL Final     INR   Date Value Ref Range Status   03/02/2020 1.0 0.8 - 1.2 Final     Comment:     Coumadin Therapy:  2.0 - 3.0 for INR for all indicators except mechanical heart valves  and antiphospholipid syndromes which should use 2.5 - 3.5.           Assessment/Plan:     1. HCC (hepatocellular carcinoma)    2. Liver neoplasm    3. Other cirrhosis of liver      Erendira Pretty is a 74 y.o. male  withHepatocellular Carcinoma    HCC (hepatocellular carcinoma)-status post ablation no evidence of recurrence  -will get MRI scan in April and then again in August  -     AFP tumor marker; Standing  -     MRI Abdomen W WO Contrast; Standing    Liver neoplasm  -     AFP tumor marker; Standing  -     MRI Abdomen W WO Contrast; Standing    Other cirrhosis of liver-low meld, well compensated  -     Comprehensive metabolic panel; Standing  -     CBC auto differential; Standing  -     AFP tumor marker; Standing  -     Protime-INR; Standing  -     MRI Abdomen W WO Contrast; Standing  -Continue to monitor MELD labs  -Continue with HCC surveillance  -Continue variceal screening-7/2022    RTC in 6 months with preclinic labs and imaging    Anna Tomas MD

## 2020-03-17 ENCOUNTER — LAB VISIT (OUTPATIENT)
Dept: LAB | Facility: HOSPITAL | Age: 75
End: 2020-03-17
Attending: INTERNAL MEDICINE
Payer: MEDICARE

## 2020-03-17 DIAGNOSIS — K74.60 CIRRHOSIS OF LIVER WITHOUT ASCITES, UNSPECIFIED HEPATIC CIRRHOSIS TYPE: ICD-10-CM

## 2020-03-17 DIAGNOSIS — Z86.2 HISTORY OF IRON DEFICIENCY ANEMIA: ICD-10-CM

## 2020-03-17 DIAGNOSIS — C22.0 HCC (HEPATOCELLULAR CARCINOMA): ICD-10-CM

## 2020-03-17 LAB
ALBUMIN SERPL BCP-MCNC: 3.8 G/DL (ref 3.5–5.2)
ALP SERPL-CCNC: 71 U/L (ref 55–135)
ALT SERPL W/O P-5'-P-CCNC: 28 U/L (ref 10–44)
ANION GAP SERPL CALC-SCNC: 11 MMOL/L (ref 8–16)
AST SERPL-CCNC: 26 U/L (ref 10–40)
BASOPHILS # BLD AUTO: 0.03 K/UL (ref 0–0.2)
BASOPHILS NFR BLD: 0.6 % (ref 0–1.9)
BILIRUB SERPL-MCNC: 0.7 MG/DL (ref 0.1–1)
BUN SERPL-MCNC: 15 MG/DL (ref 8–23)
CALCIUM SERPL-MCNC: 9.3 MG/DL (ref 8.7–10.5)
CHLORIDE SERPL-SCNC: 101 MMOL/L (ref 95–110)
CO2 SERPL-SCNC: 27 MMOL/L (ref 23–29)
CREAT SERPL-MCNC: 1 MG/DL (ref 0.5–1.4)
CRP SERPL-MCNC: 1.3 MG/L (ref 0–8.2)
DIFFERENTIAL METHOD: ABNORMAL
EOSINOPHIL # BLD AUTO: 0.2 K/UL (ref 0–0.5)
EOSINOPHIL NFR BLD: 3 % (ref 0–8)
ERYTHROCYTE [DISTWIDTH] IN BLOOD BY AUTOMATED COUNT: 13.2 % (ref 11.5–14.5)
EST. GFR  (AFRICAN AMERICAN): >60 ML/MIN/1.73 M^2
EST. GFR  (NON AFRICAN AMERICAN): >60 ML/MIN/1.73 M^2
FERRITIN SERPL-MCNC: 159 NG/ML (ref 20–300)
GLUCOSE SERPL-MCNC: 134 MG/DL (ref 70–110)
HCT VFR BLD AUTO: 41.9 % (ref 40–54)
HGB BLD-MCNC: 13.8 G/DL (ref 14–18)
IMM GRANULOCYTES # BLD AUTO: 0.01 K/UL (ref 0–0.04)
IMM GRANULOCYTES NFR BLD AUTO: 0.2 % (ref 0–0.5)
IRON SERPL-MCNC: 133 UG/DL (ref 45–160)
LYMPHOCYTES # BLD AUTO: 1.7 K/UL (ref 1–4.8)
LYMPHOCYTES NFR BLD: 34.1 % (ref 18–48)
MCH RBC QN AUTO: 33 PG (ref 27–31)
MCHC RBC AUTO-ENTMCNC: 32.9 G/DL (ref 32–36)
MCV RBC AUTO: 100 FL (ref 82–98)
MONOCYTES # BLD AUTO: 0.5 K/UL (ref 0.3–1)
MONOCYTES NFR BLD: 10.1 % (ref 4–15)
NEUTROPHILS # BLD AUTO: 2.6 K/UL (ref 1.8–7.7)
NEUTROPHILS NFR BLD: 52 % (ref 38–73)
NRBC BLD-RTO: 0 /100 WBC
PLATELET # BLD AUTO: 130 K/UL (ref 150–350)
PMV BLD AUTO: 10.5 FL (ref 9.2–12.9)
POTASSIUM SERPL-SCNC: 4 MMOL/L (ref 3.5–5.1)
PROT SERPL-MCNC: 7.5 G/DL (ref 6–8.4)
RBC # BLD AUTO: 4.18 M/UL (ref 4.6–6.2)
SATURATED IRON: 37 % (ref 20–50)
SODIUM SERPL-SCNC: 139 MMOL/L (ref 136–145)
TOTAL IRON BINDING CAPACITY: 360 UG/DL (ref 250–450)
TRANSFERRIN SERPL-MCNC: 243 MG/DL (ref 200–375)
WBC # BLD AUTO: 4.95 K/UL (ref 3.9–12.7)

## 2020-03-17 PROCEDURE — 36415 COLL VENOUS BLD VENIPUNCTURE: CPT | Mod: PO

## 2020-03-17 PROCEDURE — 86140 C-REACTIVE PROTEIN: CPT

## 2020-03-17 PROCEDURE — 80053 COMPREHEN METABOLIC PANEL: CPT

## 2020-03-17 PROCEDURE — 83540 ASSAY OF IRON: CPT

## 2020-03-17 PROCEDURE — 82728 ASSAY OF FERRITIN: CPT

## 2020-03-17 PROCEDURE — 85025 COMPLETE CBC W/AUTO DIFF WBC: CPT

## 2020-03-23 ENCOUNTER — PATIENT OUTREACH (OUTPATIENT)
Dept: PULMONOLOGY | Facility: CLINIC | Age: 75
End: 2020-03-23

## 2020-03-23 NOTE — PROGRESS NOTES
Subjective:       Patient ID: Erendira Pretty is a male.    Chief Complaint: Multiple issues see below    HPI type 2 dm controlled;  hep c cure s/p txharvoni ;cirrhosis;utd dr gibbs  [facility] f/u gi  htn  katie med   hypoth: tsh nl   Coronary artery disease:/pvd/aortic stenosis: up to date with cardiology. No chest pain, palpitations or shortness of breath. Tolerating medication.    Asthma: no c/o;utd pulm  DIPAK asympt utd     Doing ok 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    Past Medical History:   Diagnosis Date    Aortic stenosis     Asthma     BPH (benign prostatic hyperplasia)     CAD (coronary artery disease)     40% lesion 2002;lazo    Cirrhosis     Claudication 4/9/2014    Colon polyp     COPD (chronic obstructive pulmonary disease)     ED (erectile dysfunction)     Encounter for blood transfusion     Ex-smoker     Hearing loss NEC     Hepatitis C     Cured;dr gibbs, harvoni 2015    Hyperlipidemia     Hypertension     Hypothyroid     s/p tx graves    DELONTE on CPAP     Osteoarthritis     PVD (peripheral vascular disease)     Type 2 diabetes mellitus      Past Surgical History:   Procedure Laterality Date    CARDIAC CATHETERIZATION      CARDIAC SURGERY      CARPAL TUNNEL RELEASE Left     CATARACT EXTRACTION      CATARACT EXTRACTION W/ INTRAOCULAR LENS  IMPLANT, BILATERAL  2008    CATHETERIZATION OF BOTH LEFT AND RIGHT HEART N/A 11/2/2018    Procedure: CATHETERIZATION, HEART, BOTH LEFT AND RIGHT;  Surgeon: Frank Lazo MD;  Location: [facility] CATH LAB;  Service: Cardiology;  Laterality: N/A;    COLONOSCOPY  8/2013    COLONOSCOPY N/A 5/30/2016    Procedure: COLONOSCOPY;  Surgeon: Anna Gibbs MD;  Location: [facility] ENDO;  Service: Endoscopy;  Laterality: N/A;    COLONOSCOPY N/A 7/17/2019    Procedure: COLONOSCOPY;  Surgeon: David Carter III, MD;  Location: [facility] ENDO;  Service: Endoscopy;  Laterality: N/A;    COMPUTED TOMOGRAPHY N/A 9/9/2019    Procedure: CT (COMPUTED TOMOGRAPHY);  Surgeon: Sri  Diagnostic Provider;  Location: [facility] OR;  Service: General;  Laterality: N/A;  Microwave ablation to be done in CT.  Need CRNA and cart    CORONARY ARTERY BYPASS GRAFT (CABG) N/A 11/5/2018    Procedure: CORONARY ARTERY BYPASS GRAFT (CABG);  Surgeon: Bear De Luna MD;  Location: [facility] OR;  Service: Cardiothoracic;  Laterality: N/A;  TWO VESSEL BYPASS WITH AORTOTOMY AND EXCISION OF ATHEROSCLEROTIC PLAQUE    CORONARY BYPASS GRAFT ANGIOGRAPHY  3/2/2020    Procedure: Bypass graft study;  Surgeon: Cora Cormier MD;  Location: [facility] CATH LAB;  Service: Cardiology;;    ELBOW BURSA SURGERY Right 2010    ENDOSCOPIC HARVEST OF VEIN Left 11/5/2018    Procedure: SURGICAL PROCUREMENT, VEIN, ENDOSCOPIC;  Surgeon: Bear De Luna MD;  Location: [facility] OR;  Service: Cardiothoracic;  Laterality: Left;    ESOPHAGOGASTRODUODENOSCOPY N/A 7/17/2019    Procedure: ESOPHAGOGASTRODUODENOSCOPY (EGD);  Surgeon: David Carter III, MD;  Location: [facility] ENDO;  Service: Endoscopy;  Laterality: N/A;    ETHMOIDECTOMY Bilateral 3/27/2019    Procedure: ETHMOIDECTOMY;  Surgeon: Alejandro Parkinson MD;  Location: [facility] OR;  Service: ENT;  Laterality: Bilateral;    EYE SURGERY      FRONTAL SINUS OBLITERATION Bilateral 3/27/2019    Procedure: SINUSOTOMY, FRONTAL SINUS, OBLITERATIVE;  Surgeon: Alejandro Parkinson MD;  Location: [facility] OR;  Service: ENT;  Laterality: Bilateral;    FUNCTIONAL ENDOSCOPIC SINUS SURGERY (FESS) Bilateral 3/27/2019    Procedure: FESS (FUNCTIONAL ENDOSCOPIC SINUS SURGERY);  Surgeon: Alejandro Parkinson MD;  Location: [facility] OR;  Service: ENT;  Laterality: Bilateral;  Left Keila bullosa resection     KNEE ARTHROSCOPY W/ MENISCAL REPAIR Left 06/2019    dr boykin    KNEE SURGERY Right     LEFT HEART CATHETERIZATION Left 3/2/2020    Procedure: CATHETERIZATION, HEART, LEFT;  Surgeon: Cora Cormier MD;  Location: [facility] CATH LAB;  Service: Cardiology;  Laterality: Left;    MAXILLARY ANTROSTOMY Bilateral 3/27/2019    Procedure: MAXILLARY ANTROSTOMY;   Surgeon: Alejandro Parkinson MD;  Location: [facility] OR;  Service: ENT;  Laterality: Bilateral;    NASAL SEPTOPLASTY N/A 3/27/2019    Procedure: SEPTOPLASTY, NOSE;  Surgeon: Alejandro Parkinson MD;  Location: [facility] OR;  Service: ENT;  Laterality: N/A;    RECTAL SURGERY      TRANSURETHRAL RESECTION OF PROSTATE (TURP) WITHOUT USE OF LASER N/A 2018    Procedure: TURP, WITHOUT USING LASER;  Surgeon: Cooper Cordova IV, MD;  Location: [facility] OR;  Service: Urology;  Laterality: N/A;    TRIGGER FINGER RELEASE Left      Family History   Problem Relation Age of Onset    Heart disease Mother     Heart disease Father     Heart disease Brother     Colon cancer Neg Hx      Social History     Socioeconomic History    Marital status:      Spouse name: YAEL    Number of children: 2    Years of education: Not on file    Highest education level: Not on file   Occupational History    Not on file   Social Needs    Financial resource strain: Not on file    Food insecurity:     Worry: Not on file     Inability: Not on file    Transportation needs:     Medical: Not on file     Non-medical: Not on file   Tobacco Use    Smoking status: Former Smoker     Packs/day: 0.50     Years: 4.00     Pack years: 2.00     Last attempt to quit: 1972     Years since quittin.8    Smokeless tobacco: Former User     Types: Chew     Quit date: 1976   Substance and Sexual Activity    Alcohol use: Yes     Alcohol/week: 2.0 standard drinks     Types: 2 Cans of beer per week     Comment: daily  No alcohol prior to surgery    Drug use: No    Sexual activity: Yes     Partners: Female   Lifestyle    Physical activity:     Days per week: Not on file     Minutes per session: Not on file    Stress: Not on file   Relationships    Social connections:     Talks on phone: Not on file     Gets together: Not on file     Attends Islam service: Not on file     Active member of club or organization: Not on file     Attends meetings of  clubs or organizations: Not on file     Relationship status: Not on file   Other Topics Concern    Not on file   Social History Narrative    . . 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       Review of Systems  Cardiovascular: no chest pain  Chest: no shortness of breath  Abd: no abd pain  Remainder review of systems negative    Objective:    gen nad  cvrrr  Chest ctablat  Abd+bs softntnd no hsm no mass     Ext no edema    Skin no rash  M/s no jt swelling or redness      Assessment:     type 2 dm    htn  cirrhosis  Cad  bph  Asthma  pvd  AS  Liver lesion (hx hep c)  HCC    Plan:    f/u card when due   F/u gi    Has pulm, card , hemat, urol f/u    Type 2 diabetes mellitus with chronic kidney disease, with long-term current use of insulin, unspecified CKD stage  -     Hemoglobin A1c; Future; Expected date: 06/07/2020  -     Microalbumin/creatinine urine ratio; Future; Expected date: 06/07/2020    Coronary artery disease involving native coronary artery of native heart without angina pectoris    Acquired hypothyroidism    Cirrhosis of liver without ascites, unspecified hepatic cirrhosis type    Essential hypertension    Uncomplicated asthma, unspecified asthma severity, unspecified whether persistent    HCC (hepatocellular carcinoma)    Other orders  -     insulin (LANTUS SOLOSTAR U-100 INSULIN) glargine 100 units/mL (3mL) SubQ pen; Inject 44 Units into the skin every evening.  Dispense: 5 Syringe; Refill: 6    Lab [place] and follow up after in 3 months

## 2020-03-24 RX ORDER — FUROSEMIDE 40 MG/1
TABLET ORAL
Qty: 90 TABLET | Refills: 5 | Status: SHIPPED | OUTPATIENT
Start: 2020-03-24 | End: 2021-04-18

## 2020-03-30 RX ORDER — RABEPRAZOLE SODIUM 20 MG/1
TABLET, DELAYED RELEASE ORAL
Qty: 60 TABLET | Refills: 11 | Status: SHIPPED | OUTPATIENT
Start: 2020-03-30 | End: 2021-05-03

## 2020-04-06 DIAGNOSIS — J42 CHRONIC WHEEZY BRONCHITIS: ICD-10-CM

## 2020-04-06 RX ORDER — BUDESONIDE AND FORMOTEROL FUMARATE DIHYDRATE 160; 4.5 UG/1; UG/1
AEROSOL RESPIRATORY (INHALATION)
Qty: 10.2 G | Refills: 11 | Status: SHIPPED | OUTPATIENT
Start: 2020-04-06 | End: 2021-04-16

## 2020-04-13 ENCOUNTER — TELEPHONE (OUTPATIENT)
Dept: RADIOLOGY | Facility: HOSPITAL | Age: 75
End: 2020-04-13

## 2020-04-22 RX ORDER — LEVOTHYROXINE SODIUM 137 UG/1
TABLET ORAL
Qty: 60 TABLET | Refills: 5 | Status: SHIPPED | OUTPATIENT
Start: 2020-04-22 | End: 2020-10-22

## 2020-05-14 ENCOUNTER — TELEPHONE (OUTPATIENT)
Dept: GASTROENTEROLOGY | Facility: CLINIC | Age: 75
End: 2020-05-14

## 2020-05-14 NOTE — TELEPHONE ENCOUNTER
----- Message from Lanie Rogres sent at 5/14/2020  9:55 AM CDT -----  Contact: pt  Pt missed 5/12 lab appt. Pt would like to reschedule but the appt desk is being blocked.[phone]

## 2020-05-18 ENCOUNTER — LAB VISIT (OUTPATIENT)
Dept: LAB | Facility: HOSPITAL | Age: 75
End: 2020-05-18
Attending: INTERNAL MEDICINE
Payer: MEDICARE

## 2020-05-18 DIAGNOSIS — K74.69 OTHER CIRRHOSIS OF LIVER: ICD-10-CM

## 2020-05-18 LAB
ALBUMIN SERPL BCP-MCNC: 3.8 G/DL (ref 3.5–5.2)
ALP SERPL-CCNC: 79 U/L (ref 55–135)
ALT SERPL W/O P-5'-P-CCNC: 23 U/L (ref 10–44)
ANION GAP SERPL CALC-SCNC: 8 MMOL/L (ref 8–16)
AST SERPL-CCNC: 22 U/L (ref 10–40)
BILIRUB SERPL-MCNC: 0.5 MG/DL (ref 0.1–1)
BUN SERPL-MCNC: 19 MG/DL (ref 8–23)
CALCIUM SERPL-MCNC: 8.9 MG/DL (ref 8.7–10.5)
CHLORIDE SERPL-SCNC: 105 MMOL/L (ref 95–110)
CO2 SERPL-SCNC: 27 MMOL/L (ref 23–29)
CREAT SERPL-MCNC: 0.8 MG/DL (ref 0.5–1.4)
EST. GFR  (AFRICAN AMERICAN): >60 ML/MIN/1.73 M^2
EST. GFR  (NON AFRICAN AMERICAN): >60 ML/MIN/1.73 M^2
GLUCOSE SERPL-MCNC: 125 MG/DL (ref 70–110)
POTASSIUM SERPL-SCNC: 4.2 MMOL/L (ref 3.5–5.1)
PROT SERPL-MCNC: 7.5 G/DL (ref 6–8.4)
SODIUM SERPL-SCNC: 140 MMOL/L (ref 136–145)

## 2020-05-18 PROCEDURE — 80053 COMPREHEN METABOLIC PANEL: CPT

## 2020-05-18 PROCEDURE — 36415 COLL VENOUS BLD VENIPUNCTURE: CPT | Mod: PO

## 2020-05-20 ENCOUNTER — PATIENT OUTREACH (OUTPATIENT)
Dept: PULMONOLOGY | Facility: CLINIC | Age: 75
End: 2020-05-20

## 2020-05-20 NOTE — PROGRESS NOTES
Chronic Disease Management  Called patient to complete Pulmonary Disease Management Questionnaire.    Time  spent with patient: 2Minutes

## 2020-06-02 ENCOUNTER — TELEPHONE (OUTPATIENT)
Dept: OTOLARYNGOLOGY | Facility: CLINIC | Age: 75
End: 2020-06-02

## 2020-06-02 NOTE — TELEPHONE ENCOUNTER
Called and left a detailed message informing pt that Dr Parkinson will be out on Vacation this week and his appointment will be cancelled. Left a call back number to return call to reschedule.

## 2020-06-04 ENCOUNTER — HOSPITAL ENCOUNTER (OUTPATIENT)
Dept: RADIOLOGY | Facility: HOSPITAL | Age: 75
Discharge: HOME | End: 2020-06-04
Attending: INTERNAL MEDICINE
Payer: MEDICARE

## 2020-06-04 DIAGNOSIS — D49.0 LIVER NEOPLASM: ICD-10-CM

## 2020-06-04 DIAGNOSIS — C22.0 HCC (HEPATOCELLULAR CARCINOMA): ICD-10-CM

## 2020-06-04 DIAGNOSIS — K74.69 OTHER CIRRHOSIS OF LIVER: ICD-10-CM

## 2020-06-04 PROCEDURE — 74183 MRI ABD W/O CNTR FLWD CNTR: CPT | Mod: TC

## 2020-06-04 PROCEDURE — 74183 MRI ABDOMEN W WO CONTRAST: ICD-10-PCS | Mod: 26,,, | Performed by: RADIOLOGY

## 2020-06-04 PROCEDURE — 25500020 PHARM REV CODE 255: Performed by: INTERNAL MEDICINE

## 2020-06-04 PROCEDURE — 74183 MRI ABD W/O CNTR FLWD CNTR: CPT | Mod: 26,,, | Performed by: RADIOLOGY

## 2020-06-04 PROCEDURE — A9585 GADOBUTROL INJECTION: HCPCS | Performed by: INTERNAL MEDICINE

## 2020-06-04 RX ORDER — GADOBUTROL 604.72 MG/ML
10 INJECTION INTRAVENOUS
Status: COMPLETED | OUTPATIENT
Start: 2020-06-04 | End: 2020-06-04

## 2020-06-04 RX ADMIN — GADOBUTROL 10 ML: 604.72 INJECTION INTRAVENOUS at 08:06

## 2020-06-12 ENCOUNTER — TELEPHONE (OUTPATIENT)
Dept: CARDIOLOGY | Facility: CLINIC | Age: 75
End: 2020-06-12

## 2020-06-12 DIAGNOSIS — E78.2 MIXED HYPERLIPIDEMIA: ICD-10-CM

## 2020-06-12 DIAGNOSIS — I25.10 LEFT MAIN CORONARY ARTERY DISEASE: Primary | ICD-10-CM

## 2020-06-12 DIAGNOSIS — Z79.899 ENCOUNTER FOR LONG-TERM CURRENT USE OF MEDICATION: ICD-10-CM

## 2020-06-12 NOTE — TELEPHONE ENCOUNTER
Rtc and labs scheduled. Cm  ----- Message from Nando Dejesus sent at 6/12/2020 11:31 AM CDT -----  Contact: Pt  Is calling to see have labs placed in system for his cholesterol for his appt next week with the dr and can be reached at [phone]//thanks/dbw

## 2020-06-15 ENCOUNTER — LAB VISIT (OUTPATIENT)
Dept: LAB | Facility: HOSPITAL | Age: 75
End: 2020-06-15
Attending: INTERNAL MEDICINE
Payer: MEDICARE

## 2020-06-15 DIAGNOSIS — E78.2 MIXED HYPERLIPIDEMIA: ICD-10-CM

## 2020-06-15 DIAGNOSIS — Z79.899 ENCOUNTER FOR LONG-TERM CURRENT USE OF MEDICATION: ICD-10-CM

## 2020-06-15 DIAGNOSIS — I25.10 LEFT MAIN CORONARY ARTERY DISEASE: ICD-10-CM

## 2020-06-15 LAB
ALBUMIN SERPL BCP-MCNC: 3.8 G/DL (ref 3.5–5.2)
ALP SERPL-CCNC: 72 U/L (ref 55–135)
ALT SERPL W/O P-5'-P-CCNC: 22 U/L (ref 10–44)
AST SERPL-CCNC: 25 U/L (ref 10–40)
BILIRUB DIRECT SERPL-MCNC: 0.3 MG/DL (ref 0.1–0.3)
BILIRUB SERPL-MCNC: 0.6 MG/DL (ref 0.1–1)
CHOLEST SERPL-MCNC: 197 MG/DL (ref 120–199)
CHOLEST/HDLC SERPL: 4.1 {RATIO} (ref 2–5)
HDLC SERPL-MCNC: 48 MG/DL (ref 40–75)
HDLC SERPL: 24.4 % (ref 20–50)
LDLC SERPL CALC-MCNC: 116.6 MG/DL (ref 63–159)
NONHDLC SERPL-MCNC: 149 MG/DL
PROT SERPL-MCNC: 7.6 G/DL (ref 6–8.4)
TRIGL SERPL-MCNC: 162 MG/DL (ref 30–150)

## 2020-06-15 PROCEDURE — 36415 COLL VENOUS BLD VENIPUNCTURE: CPT | Mod: PO

## 2020-06-15 PROCEDURE — 80076 HEPATIC FUNCTION PANEL: CPT

## 2020-06-15 PROCEDURE — 80061 LIPID PANEL: CPT

## 2020-06-17 ENCOUNTER — TELEPHONE (OUTPATIENT)
Dept: CARDIOLOGY | Facility: CLINIC | Age: 75
End: 2020-06-17

## 2020-06-17 ENCOUNTER — PATIENT OUTREACH (OUTPATIENT)
Dept: ADMINISTRATIVE | Facility: OTHER | Age: 75
End: 2020-06-17

## 2020-06-17 NOTE — TELEPHONE ENCOUNTER
Called and pt has not tried zetia and agrees to try. He has an appt with you tomorrow and will get rx from you tomorrow. Cm  ----- Message from Frank Gallardo MD sent at 6/16/2020 10:03 PM CDT -----  Lipids reviewed , has he tried zetia?

## 2020-06-18 ENCOUNTER — OFFICE VISIT (OUTPATIENT)
Dept: CARDIOLOGY | Facility: CLINIC | Age: 75
End: 2020-06-18
Payer: MEDICARE

## 2020-06-18 VITALS
OXYGEN SATURATION: 97 % | HEART RATE: 73 BPM | SYSTOLIC BLOOD PRESSURE: 140 MMHG | WEIGHT: 242.06 LBS | HEIGHT: 68 IN | DIASTOLIC BLOOD PRESSURE: 80 MMHG | BODY MASS INDEX: 36.69 KG/M2

## 2020-06-18 DIAGNOSIS — G47.33 OSA ON CPAP: Chronic | ICD-10-CM

## 2020-06-18 DIAGNOSIS — I25.10 CORONARY ARTERY DISEASE INVOLVING NATIVE CORONARY ARTERY OF NATIVE HEART WITHOUT ANGINA PECTORIS: Chronic | ICD-10-CM

## 2020-06-18 DIAGNOSIS — E78.2 MIXED HYPERLIPIDEMIA: Primary | ICD-10-CM

## 2020-06-18 DIAGNOSIS — I10 ESSENTIAL HYPERTENSION: Chronic | ICD-10-CM

## 2020-06-18 DIAGNOSIS — I35.0 NONRHEUMATIC AORTIC VALVE STENOSIS: Chronic | ICD-10-CM

## 2020-06-18 DIAGNOSIS — Z95.1 S/P CABG X 2: ICD-10-CM

## 2020-06-18 PROCEDURE — 99214 PR OFFICE/OUTPT VISIT, EST, LEVL IV, 30-39 MIN: ICD-10-PCS | Mod: S$GLB,,, | Performed by: INTERNAL MEDICINE

## 2020-06-18 PROCEDURE — 99999 PR PBB SHADOW E&M-EST. PATIENT-LVL IV: CPT | Mod: PBBFAC,,, | Performed by: INTERNAL MEDICINE

## 2020-06-18 PROCEDURE — 99999 PR PBB SHADOW E&M-EST. PATIENT-LVL IV: ICD-10-PCS | Mod: PBBFAC,,, | Performed by: INTERNAL MEDICINE

## 2020-06-18 PROCEDURE — 99214 OFFICE O/P EST MOD 30 MIN: CPT | Mod: S$GLB,,, | Performed by: INTERNAL MEDICINE

## 2020-06-18 RX ORDER — EZETIMIBE 10 MG/1
10 TABLET ORAL DAILY
Qty: 30 TABLET | Refills: 11 | Status: SHIPPED | OUTPATIENT
Start: 2020-06-18 | End: 2021-05-21

## 2020-06-22 ENCOUNTER — OFFICE VISIT (OUTPATIENT)
Dept: INTERNAL MEDICINE | Facility: CLINIC | Age: 75
End: 2020-06-22
Payer: MEDICARE

## 2020-06-22 VITALS
HEART RATE: 70 BPM | TEMPERATURE: 98 F | HEIGHT: 68 IN | OXYGEN SATURATION: 96 % | WEIGHT: 239.88 LBS | RESPIRATION RATE: 16 BRPM | SYSTOLIC BLOOD PRESSURE: 118 MMHG | BODY MASS INDEX: 36.36 KG/M2 | DIASTOLIC BLOOD PRESSURE: 76 MMHG

## 2020-06-22 DIAGNOSIS — K74.60 CIRRHOSIS OF LIVER WITHOUT ASCITES, UNSPECIFIED HEPATIC CIRRHOSIS TYPE: ICD-10-CM

## 2020-06-22 DIAGNOSIS — C22.0 HCC (HEPATOCELLULAR CARCINOMA): ICD-10-CM

## 2020-06-22 DIAGNOSIS — E11.22 TYPE 2 DIABETES MELLITUS WITH CHRONIC KIDNEY DISEASE, WITH LONG-TERM CURRENT USE OF INSULIN, UNSPECIFIED CKD STAGE: Primary | ICD-10-CM

## 2020-06-22 DIAGNOSIS — I25.10 CORONARY ARTERY DISEASE INVOLVING NATIVE CORONARY ARTERY OF NATIVE HEART WITHOUT ANGINA PECTORIS: Chronic | ICD-10-CM

## 2020-06-22 DIAGNOSIS — E03.9 ACQUIRED HYPOTHYROIDISM: ICD-10-CM

## 2020-06-22 DIAGNOSIS — J45.909 UNCOMPLICATED ASTHMA, UNSPECIFIED ASTHMA SEVERITY, UNSPECIFIED WHETHER PERSISTENT: ICD-10-CM

## 2020-06-22 DIAGNOSIS — Z79.4 TYPE 2 DIABETES MELLITUS WITH CHRONIC KIDNEY DISEASE, WITH LONG-TERM CURRENT USE OF INSULIN, UNSPECIFIED CKD STAGE: Primary | ICD-10-CM

## 2020-06-22 DIAGNOSIS — I10 ESSENTIAL HYPERTENSION: Chronic | ICD-10-CM

## 2020-06-22 PROCEDURE — 99214 PR OFFICE/OUTPT VISIT, EST, LEVL IV, 30-39 MIN: ICD-10-PCS | Mod: S$GLB,,, | Performed by: FAMILY MEDICINE

## 2020-06-22 PROCEDURE — 3044F HG A1C LEVEL LT 7.0%: CPT | Mod: CPTII,S$GLB,, | Performed by: FAMILY MEDICINE

## 2020-06-22 PROCEDURE — 99214 OFFICE O/P EST MOD 30 MIN: CPT | Mod: S$GLB,,, | Performed by: FAMILY MEDICINE

## 2020-06-22 PROCEDURE — 1159F MED LIST DOCD IN RCRD: CPT | Mod: S$GLB,,, | Performed by: FAMILY MEDICINE

## 2020-06-22 PROCEDURE — 99999 PR PBB SHADOW E&M-EST. PATIENT-LVL V: CPT | Mod: PBBFAC,,, | Performed by: FAMILY MEDICINE

## 2020-06-22 PROCEDURE — 1159F PR MEDICATION LIST DOCUMENTED IN MEDICAL RECORD: ICD-10-PCS | Mod: S$GLB,,, | Performed by: FAMILY MEDICINE

## 2020-06-22 PROCEDURE — 3008F PR BODY MASS INDEX (BMI) DOCUMENTED: ICD-10-PCS | Mod: CPTII,S$GLB,, | Performed by: FAMILY MEDICINE

## 2020-06-22 PROCEDURE — 99999 PR PBB SHADOW E&M-EST. PATIENT-LVL V: ICD-10-PCS | Mod: PBBFAC,,, | Performed by: FAMILY MEDICINE

## 2020-06-22 PROCEDURE — 3044F PR MOST RECENT HEMOGLOBIN A1C LEVEL <7.0%: ICD-10-PCS | Mod: CPTII,S$GLB,, | Performed by: FAMILY MEDICINE

## 2020-06-22 PROCEDURE — 3008F BODY MASS INDEX DOCD: CPT | Mod: CPTII,S$GLB,, | Performed by: FAMILY MEDICINE

## 2020-06-22 PROCEDURE — 3078F PR MOST RECENT DIASTOLIC BLOOD PRESSURE < 80 MM HG: ICD-10-PCS | Mod: CPTII,S$GLB,, | Performed by: FAMILY MEDICINE

## 2020-06-22 PROCEDURE — 1101F PT FALLS ASSESS-DOCD LE1/YR: CPT | Mod: CPTII,S$GLB,, | Performed by: FAMILY MEDICINE

## 2020-06-22 PROCEDURE — 3074F SYST BP LT 130 MM HG: CPT | Mod: CPTII,S$GLB,, | Performed by: FAMILY MEDICINE

## 2020-06-22 PROCEDURE — 3078F DIAST BP <80 MM HG: CPT | Mod: CPTII,S$GLB,, | Performed by: FAMILY MEDICINE

## 2020-06-22 PROCEDURE — 1101F PR PT FALLS ASSESS DOC 0-1 FALLS W/OUT INJ PAST YR: ICD-10-PCS | Mod: CPTII,S$GLB,, | Performed by: FAMILY MEDICINE

## 2020-06-22 PROCEDURE — 3074F PR MOST RECENT SYSTOLIC BLOOD PRESSURE < 130 MM HG: ICD-10-PCS | Mod: CPTII,S$GLB,, | Performed by: FAMILY MEDICINE

## 2020-06-22 RX ORDER — SILDENAFIL 100 MG/1
TABLET, FILM COATED ORAL
Qty: 30 TABLET | Refills: 11 | Status: SHIPPED | OUTPATIENT
Start: 2020-06-22 | End: 2021-01-26

## 2020-06-22 NOTE — PROGRESS NOTES
Subjective:       Patient ID: Erendira Pretty is a male.    Chief Complaint: Multiple issues see below    HPI type 2 dm controlled;improved  hep c cure s/p txharvoni ;cirrhosis;utd dr gibbs  [facility] f/u gi  htn  katie med   hypoth: tsh nl prev  Coronary artery disease:/pvd/aortic stenosis: up to date with cardiology. No chest pain, palpitations or shortness of breath. Tolerating medication.    Asthma: no c/o;utd pulm  DIPAK asympt utd     Still Doing ok 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    ED req rf viagra. Prev lower dose. D/wd avoiding tng. hasnt needed any tng    Mild wtloss similar wt to January. No c/o dec appet    Past Medical History:   Diagnosis Date    Aortic stenosis     Asthma     BPH (benign prostatic hyperplasia)     CAD (coronary artery disease)     40% lesion 2002;violet    Cirrhosis     Claudication 4/9/2014    Colon polyp     COPD (chronic obstructive pulmonary disease)     ED (erectile dysfunction)     Encounter for blood transfusion     Ex-smoker     Hearing loss NEC     Hepatitis C     Cured;dr gibbs harvoni 2015    Hyperlipidemia     Hypertension     Hypothyroid     s/p tx graves    DELONTE on CPAP     Osteoarthritis     PVD (peripheral vascular disease)     Type 2 diabetes mellitus      Past Surgical History:   Procedure Laterality Date    CARDIAC CATHETERIZATION      CARDIAC SURGERY      CARPAL TUNNEL RELEASE Left     CATARACT EXTRACTION      CATARACT EXTRACTION W/ INTRAOCULAR LENS  IMPLANT, BILATERAL  2008    CATHETERIZATION OF BOTH LEFT AND RIGHT HEART N/A 11/2/2018    Procedure: CATHETERIZATION, HEART, BOTH LEFT AND RIGHT;  Surgeon: Frank Gallardo MD;  Location: [facility] CATH LAB;  Service: Cardiology;  Laterality: N/A;    COLONOSCOPY  8/2013    COLONOSCOPY N/A 5/30/2016    Procedure: COLONOSCOPY;  Surgeon: Anna Gibbs MD;  Location: [facility] ENDO;  Service: Endoscopy;  Laterality: N/A;    COLONOSCOPY N/A 7/17/2019    Procedure: COLONOSCOPY;  Surgeon: David Carter III, MD;   Location: [facility] ENDO;  Service: Endoscopy;  Laterality: N/A;    COMPUTED TOMOGRAPHY N/A 9/9/2019    Procedure: CT (COMPUTED TOMOGRAPHY);  Surgeon: Abrahan Diagnostic Provider;  Location: [facility] OR;  Service: General;  Laterality: N/A;  Microwave ablation to be done in CT.  Need CRNA and cart    CORONARY ARTERY BYPASS GRAFT (CABG) N/A 11/5/2018    Procedure: CORONARY ARTERY BYPASS GRAFT (CABG);  Surgeon: Bear De Luna MD;  Location: [facility] OR;  Service: Cardiothoracic;  Laterality: N/A;  TWO VESSEL BYPASS WITH AORTOTOMY AND EXCISION OF ATHEROSCLEROTIC PLAQUE    CORONARY BYPASS GRAFT ANGIOGRAPHY  3/2/2020    Procedure: Bypass graft study;  Surgeon: Cora Cormier MD;  Location: [facility] CATH LAB;  Service: Cardiology;;    ELBOW BURSA SURGERY Right 2010    ENDOSCOPIC HARVEST OF VEIN Left 11/5/2018    Procedure: SURGICAL PROCUREMENT, VEIN, ENDOSCOPIC;  Surgeon: Bear De Luna MD;  Location: [facility] OR;  Service: Cardiothoracic;  Laterality: Left;    ESOPHAGOGASTRODUODENOSCOPY N/A 7/17/2019    Procedure: ESOPHAGOGASTRODUODENOSCOPY (EGD);  Surgeon: David Carter III, MD;  Location: [facility] ENDO;  Service: Endoscopy;  Laterality: N/A;    ETHMOIDECTOMY Bilateral 3/27/2019    Procedure: ETHMOIDECTOMY;  Surgeon: Alejandro Parkinson MD;  Location: [facility] OR;  Service: ENT;  Laterality: Bilateral;    EYE SURGERY      FRONTAL SINUS OBLITERATION Bilateral 3/27/2019    Procedure: SINUSOTOMY, FRONTAL SINUS, OBLITERATIVE;  Surgeon: Alejandro Parkinson MD;  Location: [facility] OR;  Service: ENT;  Laterality: Bilateral;    FUNCTIONAL ENDOSCOPIC SINUS SURGERY (FESS) Bilateral 3/27/2019    Procedure: FESS (FUNCTIONAL ENDOSCOPIC SINUS SURGERY);  Surgeon: Alejandro Parkinson MD;  Location: [facility] OR;  Service: ENT;  Laterality: Bilateral;  Left Keila bullosa resection     KNEE ARTHROSCOPY W/ MENISCAL REPAIR Left 06/2019    dr boykin    KNEE SURGERY Right     LEFT HEART CATHETERIZATION Left 3/2/2020    Procedure: CATHETERIZATION, HEART, LEFT;  Surgeon: Cora Cormier,  MD;  Location: [facility] CATH LAB;  Service: Cardiology;  Laterality: Left;    MAXILLARY ANTROSTOMY Bilateral 3/27/2019    Procedure: MAXILLARY ANTROSTOMY;  Surgeon: Alejandro Parkinson MD;  Location: [facility] OR;  Service: ENT;  Laterality: Bilateral;    NASAL SEPTOPLASTY N/A 3/27/2019    Procedure: SEPTOPLASTY, NOSE;  Surgeon: Alejandro Parkinson MD;  Location: [facility] OR;  Service: ENT;  Laterality: N/A;    RECTAL SURGERY      TRANSURETHRAL RESECTION OF PROSTATE (TURP) WITHOUT USE OF LASER N/A 2018    Procedure: TURP, WITHOUT USING LASER;  Surgeon: Cooper Cordova IV, MD;  Location: [facility] OR;  Service: Urology;  Laterality: N/A;    TRIGGER FINGER RELEASE Left      Family History   Problem Relation Age of Onset    Heart disease Mother     Heart disease Father     Heart disease Brother     Colon cancer Neg Hx      Social History     Socioeconomic History    Marital status:      Spouse name: YAEL    Number of children: 2    Years of education: Not on file    Highest education level: Not on file   Occupational History    Not on file   Social Needs    Financial resource strain: Not on file    Food insecurity:     Worry: Not on file     Inability: Not on file    Transportation needs:     Medical: Not on file     Non-medical: Not on file   Tobacco Use    Smoking status: Former Smoker     Packs/day: 0.50     Years: 4.00     Pack years: 2.00     Last attempt to quit: 1972     Years since quittin.8    Smokeless tobacco: Former User     Types: Chew     Quit date: 1976   Substance and Sexual Activity    Alcohol use: Yes     Alcohol/week: 2.0 standard drinks     Types: 2 Cans of beer per week     Comment: daily  No alcohol prior to surgery    Drug use: No    Sexual activity: Yes     Partners: Female   Lifestyle    Physical activity:     Days per week: Not on file     Minutes per session: Not on file    Stress: Not on file   Relationships    Social connections:     Talks on phone: Not on file      Gets together: Not on file     Attends Sabianism service: Not on file     Active member of club or organization: Not on file     Attends meetings of clubs or organizations: Not on file     Relationship status: Not on file   Other Topics Concern    Not on file   Social History Narrative    . . 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       Review of Systems  Cardiovascular: no chest pain  Chest: no shortness of breath  Abd: no abd pain  Remainder review of systems negative    Objective:    gen nad  cvrrr  Chest ctablat  Abd+bs softntnd no hsm no mass     Ext no edema    Skin no rash        Assessment:     type 2 dm    htn  cirrhosis  Cad  bph  Asthma  pvd  AS    HCC hx    Plan:    f/u card when due   F/u gi  Lab and follow up after in 6 months  Has pulm, card , hemat, urol f/u    Type 2 diabetes mellitus with chronic kidney disease, with long-term current use of insulin, unspecified CKD stage  -     Hemoglobin A1C; Future; Expected date: 12/19/2020  -     Comprehensive metabolic panel; Future; Expected date: 12/19/2020  -     Lipid Panel; Future; Expected date: 12/19/2020  -     TSH; Future; Expected date: 12/19/2020    Coronary artery disease involving native coronary artery of native heart without angina pectoris    Acquired hypothyroidism    Cirrhosis of liver without ascites, unspecified hepatic cirrhosis type    Essential hypertension    Uncomplicated asthma, unspecified asthma severity, unspecified whether persistent    HCC (hepatocellular carcinoma)    Other orders  -     sildenafiL (VIAGRA) 100 MG tablet; Take 1/2 to 1 tablet by mouth one hour prior to intercourse. Max 100mg per day  Dispense: 30 tablet; Refill: 11

## 2020-06-30 ENCOUNTER — PATIENT OUTREACH (OUTPATIENT)
Dept: PULMONOLOGY | Facility: CLINIC | Age: 75
End: 2020-06-30

## 2020-06-30 NOTE — TELEPHONE ENCOUNTER
Chronic Disease Management  Called patient to complete Pulmonary Disease Management Questionnaire.

## 2020-06-30 NOTE — TELEPHONE ENCOUNTER
PATIENT SIGNED DISCHARGE INSTRUCTIONS. GAVE NUMBER AND CONTACT FOR [facility] FOR 
THE HOME INFUSIONS THAT HE'S GOING TO RECEIVE. Patient wife notified and states ok

## 2020-07-20 ENCOUNTER — LAB VISIT (OUTPATIENT)
Dept: LAB | Facility: HOSPITAL | Age: 75
End: 2020-07-20
Attending: UROLOGY
Payer: MEDICARE

## 2020-07-20 ENCOUNTER — OFFICE VISIT (OUTPATIENT)
Dept: UROLOGY | Facility: CLINIC | Age: 75
End: 2020-07-20
Payer: MEDICARE

## 2020-07-20 VITALS
WEIGHT: 239.5 LBS | TEMPERATURE: 98 F | SYSTOLIC BLOOD PRESSURE: 140 MMHG | BODY MASS INDEX: 36.42 KG/M2 | DIASTOLIC BLOOD PRESSURE: 78 MMHG

## 2020-07-20 DIAGNOSIS — Z12.5 PROSTATE CANCER SCREENING: ICD-10-CM

## 2020-07-20 DIAGNOSIS — I10 HYPERTENSION, UNSPECIFIED TYPE: ICD-10-CM

## 2020-07-20 DIAGNOSIS — N40.0 BENIGN PROSTATIC HYPERPLASIA, UNSPECIFIED WHETHER LOWER URINARY TRACT SYMPTOMS PRESENT: ICD-10-CM

## 2020-07-20 DIAGNOSIS — Z12.5 PROSTATE CANCER SCREENING: Primary | ICD-10-CM

## 2020-07-20 LAB
BILIRUB SERPL-MCNC: NORMAL MG/DL
BLOOD URINE, POC: NORMAL
CLARITY, POC UA: CLEAR
COLOR, POC UA: YELLOW
GLUCOSE UR QL STRIP: NORMAL
KETONES UR QL STRIP: NORMAL
LEUKOCYTE ESTERASE URINE, POC: NORMAL
NITRITE, POC UA: NORMAL
PH, POC UA: 5
PROTEIN, POC: NORMAL
SPECIFIC GRAVITY, POC UA: 1.01
UROBILINOGEN, POC UA: NORMAL

## 2020-07-20 PROCEDURE — 99214 PR OFFICE/OUTPT VISIT, EST, LEVL IV, 30-39 MIN: ICD-10-PCS | Mod: 25,S$GLB,, | Performed by: UROLOGY

## 2020-07-20 PROCEDURE — 99999 PR PBB SHADOW E&M-EST. PATIENT-LVL III: CPT | Mod: PBBFAC,,, | Performed by: UROLOGY

## 2020-07-20 PROCEDURE — 99999 PR PBB SHADOW E&M-EST. PATIENT-LVL III: ICD-10-PCS | Mod: PBBFAC,,, | Performed by: UROLOGY

## 2020-07-20 PROCEDURE — 1101F PR PT FALLS ASSESS DOC 0-1 FALLS W/OUT INJ PAST YR: ICD-10-PCS | Mod: CPTII,S$GLB,, | Performed by: UROLOGY

## 2020-07-20 PROCEDURE — 81002 POCT URINE DIPSTICK WITHOUT MICROSCOPE: ICD-10-PCS | Mod: S$GLB,,, | Performed by: UROLOGY

## 2020-07-20 PROCEDURE — 84153 ASSAY OF PSA TOTAL: CPT

## 2020-07-20 PROCEDURE — 1126F AMNT PAIN NOTED NONE PRSNT: CPT | Mod: S$GLB,,, | Performed by: UROLOGY

## 2020-07-20 PROCEDURE — 1101F PT FALLS ASSESS-DOCD LE1/YR: CPT | Mod: CPTII,S$GLB,, | Performed by: UROLOGY

## 2020-07-20 PROCEDURE — 1126F PR PAIN SEVERITY QUANTIFIED, NO PAIN PRESENT: ICD-10-PCS | Mod: S$GLB,,, | Performed by: UROLOGY

## 2020-07-20 PROCEDURE — 1159F MED LIST DOCD IN RCRD: CPT | Mod: S$GLB,,, | Performed by: UROLOGY

## 2020-07-20 PROCEDURE — 3078F DIAST BP <80 MM HG: CPT | Mod: CPTII,S$GLB,, | Performed by: UROLOGY

## 2020-07-20 PROCEDURE — 1159F PR MEDICATION LIST DOCUMENTED IN MEDICAL RECORD: ICD-10-PCS | Mod: S$GLB,,, | Performed by: UROLOGY

## 2020-07-20 PROCEDURE — 3008F PR BODY MASS INDEX (BMI) DOCUMENTED: ICD-10-PCS | Mod: CPTII,S$GLB,, | Performed by: UROLOGY

## 2020-07-20 PROCEDURE — 3008F BODY MASS INDEX DOCD: CPT | Mod: CPTII,S$GLB,, | Performed by: UROLOGY

## 2020-07-20 PROCEDURE — 3078F PR MOST RECENT DIASTOLIC BLOOD PRESSURE < 80 MM HG: ICD-10-PCS | Mod: CPTII,S$GLB,, | Performed by: UROLOGY

## 2020-07-20 PROCEDURE — 99214 OFFICE O/P EST MOD 30 MIN: CPT | Mod: 25,S$GLB,, | Performed by: UROLOGY

## 2020-07-20 PROCEDURE — 3077F PR MOST RECENT SYSTOLIC BLOOD PRESSURE >= 140 MM HG: ICD-10-PCS | Mod: CPTII,S$GLB,, | Performed by: UROLOGY

## 2020-07-20 PROCEDURE — 81002 URINALYSIS NONAUTO W/O SCOPE: CPT | Mod: S$GLB,,, | Performed by: UROLOGY

## 2020-07-20 PROCEDURE — 36415 COLL VENOUS BLD VENIPUNCTURE: CPT

## 2020-07-20 PROCEDURE — 3077F SYST BP >= 140 MM HG: CPT | Mod: CPTII,S$GLB,, | Performed by: UROLOGY

## 2020-07-20 RX ORDER — FINASTERIDE 5 MG/1
5 TABLET, FILM COATED ORAL DAILY
Qty: 30 TABLET | Refills: 11 | Status: SHIPPED | OUTPATIENT
Start: 2020-07-20 | End: 2021-07-21 | Stop reason: SDUPTHER

## 2020-07-20 NOTE — PROGRESS NOTES
"Chief Complaint: Hematospermia    HPI:   7/20/20: Voiding fine some nocturia x2-3.  Reviewed history in detail.  Not taking finasteride again.  7/15/19: PSA down on finasteride.  Voiding fine.  No hematuria.  Reviewed history in detail.   7/12/18: Hernandez out, stream is good.  Voiding fine.  Can't tell a difference from the flomax.  7/3/18: Finds that if he sits his urine is fine, if he walks too much he gets some hematuria.  Had retention after hernandez removed about 10d ago, had it replaced later that night.  Path benign.  6/19/18: TURP/urethral polyp resection  6/4/18: Ct Urogram reassuring only some small bilateral simple cysts.  Cysto shows a couple of small polyps int he prostatic urethra proximal to the veru, and just distal to the veru at 6 o'clock there is a different polypoid mass resembling Ta TCC.  5/7/18: A couple weeks ago he had a lot of red blood in the semen again.  No other symptoms at all.  Occasional sildanefil.  US mentions a new complex left renal cyst.  4/4/17: The burning symptoms are gone, the fresh blood has abated, and has had a change to some brown color to the semen still.  2/14/17: Two times last week had some hematospermia.  No gross hematuria.  No LUTS.  Has a burning sensation that just started.  Is coming off some Abx for URI (azithromycin/PCN).  Finds a burning sensation in the base of the penis, under scrotum at the end of voiding and then is better but still felt.  Uses a CPAP, nocturia x2-3.  Dr. Roblero checks prostate annually.  8/15: Melissa: "70-year-old male returns to the clinic today for follow-up on his BPH.  At his last visit, we had discussed that restricting his intake of caffeine had significantly helped his daytime symptoms, but he was still having trouble at night with having to get up to urinate.  We discussed monitoring his fluid intake in the evenings even more strictly, and he stated that he would prefer to do that rather than start on medication.  Since then, he has " "been able to restrict his evening fluid intake even more, and is doing better.  He is not interested in starting any medication at the present time. He is not having any other urinary symptoms or problems, and his urinalysis here in the clinic today is normal."    Allergies:  Lipitor [atorvastatin], Niacin preparations, Iodinated contrast media, Omeprazole, and Prilosec [omeprazole magnesium]    Medications:  has a current medication list which includes the following prescription(s): accu-chek lux plus meter, amlodipine, blood sugar diagnostic, budesonide-formoterol 160-4.5 mcg, ezetimibe, furosemide, glucosamine/chondr leach a sod, humalog kwikpen insulin, insulin, krill oil, lancets, lisinopril, metoprolol succinate, mometasone, pen needle, diabetic, rabeprazole, sildenafil, synthroid, albuterol-ipratropium, aspirin, and nitroglycerin, and the following Facility-Administered Medications: lactated ringers.    Review of Systems:  General: No fever, chills, fatigability, or weight loss.  Skin: No rashes, itching, or changes in color or texture of skin.  Chest: Denies MARCUS, cyanosis, wheezing, cough, and sputum production.  Abdomen: Appetite fine. No weight loss. Denies diarrhea, abdominal pain, hematemesis, or blood in stool.  Musculoskeletal: No joint stiffness or swelling. Denies back pain.  : As above.  All other review of systems negative.    PMH:   has a past medical history of Aortic stenosis, Asthma, BPH (benign prostatic hyperplasia), CAD (coronary artery disease), Cirrhosis, Claudication (4/9/2014), Colon polyp, COPD (chronic obstructive pulmonary disease), ED (erectile dysfunction), Encounter for blood transfusion, Ex-smoker, Hearing loss NEC, Hepatitis C, Hyperlipidemia, Hypertension, Hypothyroid, DELONTE on CPAP, Osteoarthritis, PVD (peripheral vascular disease), and Type 2 diabetes mellitus.    PSH:   has a past surgical history that includes Knee surgery (Right); Trigger finger release (Left); Carpal tunnel " release (Left); Elbow bursa surgery (Right, 2010); Rectal surgery (2012); Eye surgery; Cataract extraction w/ intraocular lens  implant, bilateral (2008); Cardiac catheterization; Colonoscopy (8/2013); Colonoscopy (N/A, 5/30/2016); Transurethral resection of prostate (TURP) without use of laser (N/A, 6/19/2018); Coronary Artery Bypass Graft (CABG) (N/A, 11/5/2018); Endoscopic harvest of vein (Left, 11/5/2018); Cardiac surgery; Catheterization of both left and right heart (N/A, 11/2/2018); Functional endoscopic sinus surgery (FESS) (Bilateral, 3/27/2019); Frontal sinus obliteration (Bilateral, 3/27/2019); Maxillary antrostomy (Bilateral, 3/27/2019); Ethmoidectomy (Bilateral, 3/27/2019); Nasal septoplasty (N/A, 3/27/2019); Knee arthroscopy w/ meniscal repair (Left, 06/2019); Esophagogastroduodenoscopy (N/A, 7/17/2019); Colonoscopy (N/A, 7/17/2019); Computed tomography (N/A, 9/9/2019); Cataract extraction; Left heart catheterization (Left, 3/2/2020); and Coronary bypass graft angiography (3/2/2020).    FamHx: family history includes Heart disease in his brother, father, and mother.    SocHx:  reports that he quit smoking about 48 years ago. He has a 2.00 pack-year smoking history. He quit smokeless tobacco use about 43 years ago.  His smokeless tobacco use included chew. He reports current alcohol use of about 2.0 standard drinks of alcohol per week. He reports that he does not use drugs.      Physical Exam:  Vitals:    07/20/20 1445   BP: (!) 140/78   Temp: 98.4 °F (36.9 °C)     General: A&Ox3, no apparent distress, no deformities  Neck: No masses, normal thyroid  Lungs: normal inspiration, no use of accessory muscles  Heart: normal pulse, no arrhythmias  Abdomen: Soft, NT, ND  Skin: The skin is warm and dry. No jaundice.  Ext: No c/c/e.  :   2/17: Test desc eldoia, no abnormalities of epididymus. Penis normal, with normal penile and scrotal skin. Meatus normal. Normal rectal tone, no hemorrhoids. Prost 20 gm no  nodules or masses appreciated. SV not palpable. Perineum and anus normal.    Labs/Studies:   Urinalysis performed in clinic, summary: UA normal  PSA    6/18: 0.35    3/16: 0.4    7/19: 0.08    Impression/Plan:   1. Stopped finasteride for long term improvement of likely prostate-related problems.  Discussed.  Rx written to restart.  2. PSA today.  3. HTN: discussed mild elev; takes his meds

## 2020-07-21 LAB — COMPLEXED PSA SERPL-MCNC: 0.27 NG/ML (ref 0–4)

## 2020-07-28 ENCOUNTER — SOCIAL WORK (OUTPATIENT)
Dept: HEMATOLOGY/ONCOLOGY | Facility: CLINIC | Age: 75
End: 2020-07-28

## 2020-07-28 NOTE — PROGRESS NOTES
Pt referred to SW by financial counselor for help with cancer policy. [facility] to meet with SW in person. He is known to SW as his late wife was a longstanding patient of Dr. Pineda (and this SW). Pt has liver cancer and is in need of help to utilize his cancer policy. Assisted pt in completing his portion of the claim. Will print out pathology report, send MD page to Dr. Tomas for completion, and work with financial counselor to get appropriate itemized statements to submit. Provided pt with SW contact info; encouraged him to f/u with any future needs. SW will remain available to provide ongoing support and assist as needs are identified.    Oncology Social Work   Intake  Date of Diagnosis: 08/13/19  MD Assigned: not established with oncologist, seeing Dr. Anna Tomas  Patient currently being followed by outpatient case management: No  Date of referral to social work: 07/28/20  Initial social work contact: 07/28/20  Referral to initial contact timeline: 0  Referral contact method: Instant message  Contact method: In person  Date worked: 07/28/20     Intervention  Current Status: Surveillance       Support Systems: Children     Supportive Checklist      Follow Up  No follow-ups on file.

## 2020-07-30 ENCOUNTER — PATIENT OUTREACH (OUTPATIENT)
Dept: PULMONOLOGY | Facility: CLINIC | Age: 75
End: 2020-07-30

## 2020-08-06 DIAGNOSIS — G47.33 OSA ON CPAP: Primary | ICD-10-CM

## 2020-08-11 ENCOUNTER — HOSPITAL ENCOUNTER (OUTPATIENT)
Dept: RADIOLOGY | Facility: HOSPITAL | Age: 75
Discharge: HOME | End: 2020-08-11
Attending: INTERNAL MEDICINE
Payer: MEDICARE

## 2020-08-11 DIAGNOSIS — D49.0 LIVER NEOPLASM: ICD-10-CM

## 2020-08-11 DIAGNOSIS — K74.69 OTHER CIRRHOSIS OF LIVER: ICD-10-CM

## 2020-08-11 DIAGNOSIS — C22.0 HCC (HEPATOCELLULAR CARCINOMA): ICD-10-CM

## 2020-08-11 PROCEDURE — 25500020 PHARM REV CODE 255: Performed by: INTERNAL MEDICINE

## 2020-08-11 PROCEDURE — 74183 MRI ABDOMEN W WO CONTRAST: ICD-10-PCS | Mod: 26,,, | Performed by: RADIOLOGY

## 2020-08-11 PROCEDURE — A9585 GADOBUTROL INJECTION: HCPCS | Performed by: INTERNAL MEDICINE

## 2020-08-11 PROCEDURE — 74183 MRI ABD W/O CNTR FLWD CNTR: CPT | Mod: 26,,, | Performed by: RADIOLOGY

## 2020-08-11 PROCEDURE — 74183 MRI ABD W/O CNTR FLWD CNTR: CPT | Mod: TC

## 2020-08-11 RX ORDER — GADOBUTROL 604.72 MG/ML
10 INJECTION INTRAVENOUS
Status: COMPLETED | OUTPATIENT
Start: 2020-08-11 | End: 2020-08-11

## 2020-08-11 RX ADMIN — GADOBUTROL 10 ML: 604.72 INJECTION INTRAVENOUS at 09:08

## 2020-08-17 ENCOUNTER — LAB VISIT (OUTPATIENT)
Dept: URGENT CARE | Facility: CLINIC | Age: 75
End: 2020-08-17
Payer: MEDICARE

## 2020-08-17 VITALS
SYSTOLIC BLOOD PRESSURE: 138 MMHG | HEART RATE: 72 BPM | DIASTOLIC BLOOD PRESSURE: 78 MMHG | OXYGEN SATURATION: 98 % | TEMPERATURE: 99 F | RESPIRATION RATE: 18 BRPM

## 2020-08-17 DIAGNOSIS — G47.33 OSA ON CPAP: ICD-10-CM

## 2020-08-17 PROCEDURE — U0003 INFECTIOUS AGENT DETECTION BY NUCLEIC ACID (DNA OR RNA); SEVERE ACUTE RESPIRATORY SYNDROME CORONAVIRUS 2 (SARS-COV-2) (CORONAVIRUS DISEASE [COVID-19]), AMPLIFIED PROBE TECHNIQUE, MAKING USE OF HIGH THROUGHPUT TECHNOLOGIES AS DESCRIBED BY CMS-2020-01-R: HCPCS

## 2020-08-18 LAB — SARS-COV-2 RNA RESP QL NAA+PROBE: NOT DETECTED

## 2020-08-19 ENCOUNTER — OFFICE VISIT (OUTPATIENT)
Dept: GASTROENTEROLOGY | Facility: CLINIC | Age: 75
End: 2020-08-19
Payer: MEDICARE

## 2020-08-19 ENCOUNTER — PROCEDURE VISIT (OUTPATIENT)
Dept: PULMONOLOGY | Facility: CLINIC | Age: 75
End: 2020-08-19
Payer: MEDICARE

## 2020-08-19 VITALS
SYSTOLIC BLOOD PRESSURE: 142 MMHG | BODY MASS INDEX: 35.95 KG/M2 | HEIGHT: 68 IN | HEART RATE: 80 BPM | DIASTOLIC BLOOD PRESSURE: 76 MMHG | WEIGHT: 237.19 LBS

## 2020-08-19 VITALS — BODY MASS INDEX: 35.88 KG/M2 | HEIGHT: 68 IN | OXYGEN SATURATION: 97 % | HEART RATE: 76 BPM | WEIGHT: 236.75 LBS

## 2020-08-19 DIAGNOSIS — K74.69 OTHER CIRRHOSIS OF LIVER: Primary | ICD-10-CM

## 2020-08-19 DIAGNOSIS — C22.0 HCC (HEPATOCELLULAR CARCINOMA): ICD-10-CM

## 2020-08-19 DIAGNOSIS — J45.909 ASTHMA: Primary | ICD-10-CM

## 2020-08-19 PROCEDURE — 99213 OFFICE O/P EST LOW 20 MIN: CPT | Mod: S$GLB,,, | Performed by: INTERNAL MEDICINE

## 2020-08-19 PROCEDURE — 1101F PR PT FALLS ASSESS DOC 0-1 FALLS W/OUT INJ PAST YR: ICD-10-PCS | Mod: CPTII,S$GLB,, | Performed by: INTERNAL MEDICINE

## 2020-08-19 PROCEDURE — 1159F PR MEDICATION LIST DOCUMENTED IN MEDICAL RECORD: ICD-10-PCS | Mod: S$GLB,,, | Performed by: INTERNAL MEDICINE

## 2020-08-19 PROCEDURE — 1126F PR PAIN SEVERITY QUANTIFIED, NO PAIN PRESENT: ICD-10-PCS | Mod: S$GLB,,, | Performed by: INTERNAL MEDICINE

## 2020-08-19 PROCEDURE — 99999 PR PBB SHADOW E&M-EST. PATIENT-LVL V: ICD-10-PCS | Mod: PBBFAC,,, | Performed by: INTERNAL MEDICINE

## 2020-08-19 PROCEDURE — 1159F MED LIST DOCD IN RCRD: CPT | Mod: S$GLB,,, | Performed by: INTERNAL MEDICINE

## 2020-08-19 PROCEDURE — 1101F PT FALLS ASSESS-DOCD LE1/YR: CPT | Mod: CPTII,S$GLB,, | Performed by: INTERNAL MEDICINE

## 2020-08-19 PROCEDURE — 99213 PR OFFICE/OUTPT VISIT, EST, LEVL III, 20-29 MIN: ICD-10-PCS | Mod: S$GLB,,, | Performed by: INTERNAL MEDICINE

## 2020-08-19 PROCEDURE — 3077F PR MOST RECENT SYSTOLIC BLOOD PRESSURE >= 140 MM HG: ICD-10-PCS | Mod: CPTII,S$GLB,, | Performed by: INTERNAL MEDICINE

## 2020-08-19 PROCEDURE — 3078F DIAST BP <80 MM HG: CPT | Mod: CPTII,S$GLB,, | Performed by: INTERNAL MEDICINE

## 2020-08-19 PROCEDURE — 1126F AMNT PAIN NOTED NONE PRSNT: CPT | Mod: S$GLB,,, | Performed by: INTERNAL MEDICINE

## 2020-08-19 PROCEDURE — 99999 PR PBB SHADOW E&M-EST. PATIENT-LVL V: CPT | Mod: PBBFAC,,, | Performed by: INTERNAL MEDICINE

## 2020-08-19 PROCEDURE — 3078F PR MOST RECENT DIASTOLIC BLOOD PRESSURE < 80 MM HG: ICD-10-PCS | Mod: CPTII,S$GLB,, | Performed by: INTERNAL MEDICINE

## 2020-08-19 PROCEDURE — 3008F PR BODY MASS INDEX (BMI) DOCUMENTED: ICD-10-PCS | Mod: CPTII,S$GLB,, | Performed by: INTERNAL MEDICINE

## 2020-08-19 PROCEDURE — 3077F SYST BP >= 140 MM HG: CPT | Mod: CPTII,S$GLB,, | Performed by: INTERNAL MEDICINE

## 2020-08-19 PROCEDURE — 3008F BODY MASS INDEX DOCD: CPT | Mod: CPTII,S$GLB,, | Performed by: INTERNAL MEDICINE

## 2020-08-19 NOTE — PROGRESS NOTES
Subjective:     Erendira Pretty is here for follow up of cirrhosis    HPI  Since Erendira Pretty's last visit there have been no significant issues.  Overall feels well.    No evidence of liver decompensation: no ascites, confusion or GI bleeding.  He is still adjusting to life since his wife .    HCC s/p ablation 2019    Review of Systems    Objective:     Physical Exam  Vitals signs reviewed.   Constitutional:       General: He is not in acute distress.     Appearance: He is well-developed.   HENT:      Head: Normocephalic and atraumatic.      Mouth/Throat:      Pharynx: No oropharyngeal exudate.   Eyes:      General: No scleral icterus.        Right eye: No discharge.         Left eye: No discharge.      Conjunctiva/sclera: Conjunctivae normal.      Pupils: Pupils are equal, round, and reactive to light.   Pulmonary:      Effort: Pulmonary effort is normal. No respiratory distress.      Breath sounds: Normal breath sounds. No wheezing.   Abdominal:      General: There is no distension.      Palpations: Abdomen is soft.      Tenderness: There is no abdominal tenderness.   Neurological:      Mental Status: He is alert and oriented to person, place, and time.   Psychiatric:         Behavior: Behavior normal.         MELD-Na score: 6 at 2020  8:45 AM  MELD score: 6 at 2020  8:45 AM  Calculated from:  Serum Creatinine: 1.0 mg/dL at 2020  8:45 AM  Serum Sodium: 138 mmol/L (Rounded to 137 mmol/L) at 2020  8:45 AM  Total Bilirubin: 0.7 mg/dL (Rounded to 1 mg/dL) at 2020  8:45 AM  INR(ratio): 1.0 at 2020  8:45 AM  Age: 75 years 2 months    WBC   Date Value Ref Range Status   2020 5.16 3.90 - 12.70 K/uL Final     Hemoglobin   Date Value Ref Range Status   2020 14.4 14.0 - 18.0 g/dL Final     POC Hematocrit   Date Value Ref Range Status   2018 26 (L) 36 - 54 %PCV Final     Hematocrit   Date Value Ref Range Status   2020 41.1 40.0 - 54.0 % Final     Platelets   Date  Value Ref Range Status   08/11/2020 153 150 - 350 K/uL Final     BUN, Bld   Date Value Ref Range Status   08/11/2020 16 8 - 23 mg/dL Final     Creatinine   Date Value Ref Range Status   08/11/2020 1.0 0.5 - 1.4 mg/dL Final     Glucose   Date Value Ref Range Status   08/11/2020 121 (H) 70 - 110 mg/dL Final     Calcium   Date Value Ref Range Status   08/11/2020 9.3 8.7 - 10.5 mg/dL Final     Sodium   Date Value Ref Range Status   08/11/2020 138 136 - 145 mmol/L Final     Potassium   Date Value Ref Range Status   08/11/2020 4.4 3.5 - 5.1 mmol/L Final     Chloride   Date Value Ref Range Status   08/11/2020 103 95 - 110 mmol/L Final     Magnesium   Date Value Ref Range Status   11/08/2018 2.1 1.6 - 2.6 mg/dL Final     AST   Date Value Ref Range Status   08/11/2020 24 10 - 40 U/L Final     ALT   Date Value Ref Range Status   08/11/2020 24 10 - 44 U/L Final     Alkaline Phosphatase   Date Value Ref Range Status   08/11/2020 71 55 - 135 U/L Final     Total Bilirubin   Date Value Ref Range Status   08/11/2020 0.7 0.1 - 1.0 mg/dL Final     Comment:     For infants and newborns, interpretation of results should be based  on gestational age, weight and in agreement with clinical  observations.  Premature Infant recommended reference ranges:  Up to 24 hours.............<8.0 mg/dL  Up to 48 hours............<12.0 mg/dL  3-5 days..................<15.0 mg/dL  6-29 days.................<15.0 mg/dL       Albumin   Date Value Ref Range Status   08/11/2020 3.8 3.5 - 5.2 g/dL Final     INR   Date Value Ref Range Status   08/11/2020 1.0 0.8 - 1.2 Final     Comment:     Coumadin Therapy:  2.0 - 3.0 for INR for all indicators except mechanical heart valves  and antiphospholipid syndromes which should use 2.5 - 3.5.           Assessment/Plan:     1. Other cirrhosis of liver    2. HCC (hepatocellular carcinoma)      Erendira Pretty is a 75 y.o. male withHepatocellular Carcinoma      Cirrhosis- low MELD, well compensated  -Continue to monitor  MELD labs  -Continue with HCC surveillance  -Continue variceal screening-7/2021    HCC-no evidence of recurrence  -repeat MRI in 6 months    RTC in 6 months with preclinic labs and imaging    Anna Tomas MD

## 2020-08-19 NOTE — PROGRESS NOTES
Pulmonary Disease Management  [facility]  Final Follow up Visit  Chronic Care Management    Erendira Pretty  was seen 8/19/2020  11:00 AM in the Pulmonary Disease Management Clinic for evaluation, education, reinforcement of self management techniques and exacerbation action plan.    Ryan Clement    Past Medical History:   Diagnosis Date    Aortic stenosis     Asthma     BPH (benign prostatic hyperplasia)     CAD (coronary artery disease)     40% lesion 2002;lazo    Cirrhosis     Claudication 4/9/2014    Colon polyp     COPD (chronic obstructive pulmonary disease)     ED (erectile dysfunction)     Encounter for blood transfusion     Ex-smoker     Hearing loss NEC     Hepatitis C     Cured;dr gibbs harvoni 2015    Hyperlipidemia     Hypertension     Hypothyroid     s/p tx graves    DELONTE on CPAP     Osteoarthritis     PVD (peripheral vascular disease)     Type 2 diabetes mellitus        Patient's Medications   New Prescriptions    No medications on file   Previous Medications    ACCU-CHEK GRACE PLUS METER MISC    AS DIRECTED    ALBUTEROL-IPRATROPIUM (DUO-NEB) 2.5 MG-0.5 MG/3 ML NEBULIZER SOLUTION    Take 3 mLs by nebulization 2 (two) times daily. Rescue    AMLODIPINE (NORVASC) 10 MG TABLET    Take 1 tablet (10 mg total) by mouth once daily.    ASPIRIN (ECOTRIN) 81 MG EC TABLET    Take 1 tablet (81 mg total) by mouth once daily.    BLOOD SUGAR DIAGNOSTIC (ACCU-CHEK GRACE PLUS TEST STRP) STRP    TEST THREE TIMES A DAY    BUDESONIDE-FORMOTEROL 160-4.5 MCG (SYMBICORT) 160-4.5 MCG/ACTUATION HFAA    INHALE 2 PUFFS INTO THE LUNGS TWO TIMES A DAY.    EZETIMIBE (ZETIA) 10 MG TABLET    Take 1 tablet (10 mg total) by mouth once daily.    FINASTERIDE (PROSCAR) 5 MG TABLET    Take 1 tablet (5 mg total) by mouth once daily.    FUROSEMIDE (LASIX) 40 MG TABLET    TAKE ONE BY MOUTH TWO TIMES A DAY, AND TAKE ONE BY MOUTH IF NEEDED FOR 14 DAYS.    GLUCOSAMINE HCL/CHONDR ROSARIO A NA (OSTEO BI-FLEX ORAL)     "Take 1 tablet by mouth 2 (two) times daily.     HUMALOG KWIKPEN INSULIN 100 UNIT/ML PEN    INJECT 3-4 UNITS INTO THE SKIN THREE TIMES DAILY BEFORE MEALS.    INSULIN (LANTUS SOLOSTAR U-100 INSULIN) GLARGINE 100 UNITS/ML (3ML) SUBQ PEN    Inject 44 Units into the skin every evening.    KRILL  MG CAP    Take by mouth.    LANCETS (ACCU-CHEK FASTCLIX LANCET DRUM) MISC    TEST TWO TIMES A DAY    LISINOPRIL 10 MG TABLET    Take 1 tablet (10 mg total) by mouth once daily.    METOPROLOL SUCCINATE (TOPROL-XL) 50 MG 24 HR TABLET    TAKE 1 TABLET BY MOUTH TWO TIMES A DAY    MOMETASONE (NASONEX) 50 MCG/ACTUATION NASAL SPRAY    2 sprays by Nasal route once daily.    NITROGLYCERIN (NITROSTAT) 0.4 MG SL TABLET    Place 1 tablet (0.4 mg total) under the tongue every 5 (five) minutes as needed for Chest pain. Do not take with Sildenafil    PEN NEEDLE, DIABETIC (BD ULTRA-FINE MINI PEN NEEDLE) 31 GAUGE X 3/16" NDLE    USE AS DIRECTED    RABEPRAZOLE (ACIPHEX) 20 MG TABLET    TAKE 1 TABLET BY MOUTH TWO TIMES A DAY    SILDENAFIL (VIAGRA) 100 MG TABLET    Take 1/2 to 1 tablet by mouth one hour prior to intercourse. Max 100mg per day    SYNTHROID 137 MCG TAB TABLET    TAKE 2 TABLETS BY MOUTH BEFORE BREAKFAST.   Modified Medications    No medications on file   Discontinued Medications    No medications on file         Educational assessment:   [x]            Good  []            Fair  []            Poor    Readiness to learn:   [x]            Good  []            Fair  []            Poor    Vision Status:   [x]            Good  []            Fair  []            Poor    Reading Ability:  [x]            Good  []            Fair  []            Poor    Knowledge of condition:   [x]            Good  []            Fair  []            Poor    Language Barriers:   []            Good  []            Fair  []            Poor  [x]            None    Cognitive/ Physical Barriers:   [x]            Good  []            Fair  []            Poor  []      "       None    Learning best by:                       [x]            Seeing  []            Hearing  []            Reading                         [x]            Doing    Describe any barrier /Limitation or financial implications of care choices identified     []            Financial  []            Emotional  []            Education  []            Vision/Hearing  []            Physical  [x]            None  []                TOPIC /CONTENT FOR TODAY:    [x]            MDI with or without spacer  []            Dry powder inhaler  []            Acapella   []           Peak Flow meter  [x]            Asthma action plan  [x]            Nebulizer use  []            Oxygen use safety  [x]            Breathing and cough techniques  [x]            Energy conservation  [x]            Infection prevention  []           OTHER________________________        Learner:    [x]            Patient   []            Caregiver    Method:    [x]            Verbal explanation  [x]            Audio visual    [x]            Literature  [x]            Teach back      Evaluation:    [x]            Teach back  [x]            Demonstrate  [x]            Follow up phone call    []            2 weeks     []            4 weeks   []            PRN    Additional comments:   Patient was seen today for final Pulmonary Disease Management appointment.  Reviewed respiratory medication purpose and proper technique for use of inhalers. Patient practiced proper technique using DPI inhalers. Patient demonstrated understanding. Literature was given to patient.     Discussed the difference between maintenance versus rescue medications. Patient reports taking one puff of Symbicort, twice daily. Reminded patient of prescribed frequency of use for Symbicort. Patient stated understanding.     Reviewed breathing techniques such as pursed-lip breathing. Patient practiced proper technique and verbalized understanding. Literature given to patient.       Discussed  therapeutic goals for positive airway pressure therapy(CPAP or BiPAP): Ideal is usage 100% of nights for 6 - 8 hours per night. Minimum usage is 70% of night for at least 4 hours per night used. Reviewed CPAP habituation plan with patient. Patient expressed understanding.      Discussed complications of untreated sleep apnea with patient, including but not limited to high blood pressure, heart attack, stroke, obesity, diabetes and worsening heart failure. Patient voiced understanding.      Infection prevention was discussed. Patient's immunizations are current. Hand hygiene and cleaning of respiratory equipment was also discussed. Patient verbalized understanding.      Asthma action plan was reviewed and literature was given to patient. Patient verbalized understanding.

## 2020-08-20 ENCOUNTER — HOSPITAL ENCOUNTER (OUTPATIENT)
Dept: RADIOLOGY | Facility: HOSPITAL | Age: 75
Discharge: HOME | End: 2020-08-20
Attending: INTERNAL MEDICINE
Payer: MEDICARE

## 2020-08-20 ENCOUNTER — OFFICE VISIT (OUTPATIENT)
Dept: SLEEP MEDICINE | Facility: CLINIC | Age: 75
End: 2020-08-20
Payer: MEDICARE

## 2020-08-20 ENCOUNTER — DOCUMENTATION ONLY (OUTPATIENT)
Dept: HEMATOLOGY/ONCOLOGY | Facility: CLINIC | Age: 75
End: 2020-08-20

## 2020-08-20 ENCOUNTER — CLINICAL SUPPORT (OUTPATIENT)
Dept: PULMONOLOGY | Facility: CLINIC | Age: 75
End: 2020-08-20
Payer: MEDICARE

## 2020-08-20 VITALS
SYSTOLIC BLOOD PRESSURE: 140 MMHG | HEIGHT: 68 IN | WEIGHT: 236.31 LBS | DIASTOLIC BLOOD PRESSURE: 80 MMHG | RESPIRATION RATE: 20 BRPM | BODY MASS INDEX: 35.81 KG/M2 | OXYGEN SATURATION: 97 % | HEART RATE: 81 BPM

## 2020-08-20 DIAGNOSIS — Z79.4 TYPE 2 DIABETES MELLITUS WITH CHRONIC KIDNEY DISEASE, WITH LONG-TERM CURRENT USE OF INSULIN, UNSPECIFIED CKD STAGE: Chronic | ICD-10-CM

## 2020-08-20 DIAGNOSIS — E11.22 TYPE 2 DIABETES MELLITUS WITH CHRONIC KIDNEY DISEASE, WITH LONG-TERM CURRENT USE OF INSULIN, UNSPECIFIED CKD STAGE: Chronic | ICD-10-CM

## 2020-08-20 DIAGNOSIS — G47.33 OSA ON CPAP: Chronic | ICD-10-CM

## 2020-08-20 DIAGNOSIS — E78.2 MIXED HYPERLIPIDEMIA: ICD-10-CM

## 2020-08-20 DIAGNOSIS — G47.33 OSA ON CPAP: ICD-10-CM

## 2020-08-20 DIAGNOSIS — J42 CHRONIC WHEEZY BRONCHITIS: Primary | ICD-10-CM

## 2020-08-20 DIAGNOSIS — I10 ESSENTIAL HYPERTENSION: Chronic | ICD-10-CM

## 2020-08-20 LAB
BRPFT: NORMAL
FEF 25 75 LLN: 0.89
FEF 25 75 PRE REF: 87.9 %
FEF 25 75 REF: 2.15
FEV1 FVC LLN: 62
FEV1 FVC PRE REF: 98.2 %
FEV1 FVC REF: 76
FEV1 LLN: 2.05
FEV1 PRE REF: 87.9 %
FEV1 REF: 2.89
FVC LLN: 2.81
FVC PRE REF: 89 %
FVC REF: 3.83
PEF LLN: 5.33
PEF PRE REF: 120.3 %
PEF REF: 7.52
PRE FEF 25 75: 1.89 L/S (ref 0.89–3.42)
PRE FET 100: 11.48 SEC
PRE FEV1 FVC: 74.38 % (ref 61.69–89.75)
PRE FEV1: 2.54 L (ref 2.05–3.72)
PRE FVC: 3.41 L (ref 2.81–4.85)
PRE PEF: 9.05 L/S (ref 5.33–9.71)

## 2020-08-20 PROCEDURE — 94010 BREATHING CAPACITY TEST: CPT | Mod: S$GLB,,, | Performed by: INTERNAL MEDICINE

## 2020-08-20 PROCEDURE — 71046 XR CHEST PA AND LATERAL: ICD-10-PCS | Mod: 26,,, | Performed by: RADIOLOGY

## 2020-08-20 PROCEDURE — 3044F HG A1C LEVEL LT 7.0%: CPT | Mod: CPTII,S$GLB,, | Performed by: INTERNAL MEDICINE

## 2020-08-20 PROCEDURE — 99214 OFFICE O/P EST MOD 30 MIN: CPT | Mod: 25,S$GLB,, | Performed by: INTERNAL MEDICINE

## 2020-08-20 PROCEDURE — 71046 X-RAY EXAM CHEST 2 VIEWS: CPT | Mod: 26,,, | Performed by: RADIOLOGY

## 2020-08-20 PROCEDURE — 3079F DIAST BP 80-89 MM HG: CPT | Mod: CPTII,S$GLB,, | Performed by: INTERNAL MEDICINE

## 2020-08-20 PROCEDURE — 3079F PR MOST RECENT DIASTOLIC BLOOD PRESSURE 80-89 MM HG: ICD-10-PCS | Mod: CPTII,S$GLB,, | Performed by: INTERNAL MEDICINE

## 2020-08-20 PROCEDURE — 1159F PR MEDICATION LIST DOCUMENTED IN MEDICAL RECORD: ICD-10-PCS | Mod: S$GLB,,, | Performed by: INTERNAL MEDICINE

## 2020-08-20 PROCEDURE — 99999 PR PBB SHADOW E&M-EST. PATIENT-LVL V: CPT | Mod: PBBFAC,,, | Performed by: INTERNAL MEDICINE

## 2020-08-20 PROCEDURE — 3044F PR MOST RECENT HEMOGLOBIN A1C LEVEL <7.0%: ICD-10-PCS | Mod: CPTII,S$GLB,, | Performed by: INTERNAL MEDICINE

## 2020-08-20 PROCEDURE — 3077F SYST BP >= 140 MM HG: CPT | Mod: CPTII,S$GLB,, | Performed by: INTERNAL MEDICINE

## 2020-08-20 PROCEDURE — 94010 BREATHING CAPACITY TEST: ICD-10-PCS | Mod: S$GLB,,, | Performed by: INTERNAL MEDICINE

## 2020-08-20 PROCEDURE — 3008F PR BODY MASS INDEX (BMI) DOCUMENTED: ICD-10-PCS | Mod: CPTII,S$GLB,, | Performed by: INTERNAL MEDICINE

## 2020-08-20 PROCEDURE — 3008F BODY MASS INDEX DOCD: CPT | Mod: CPTII,S$GLB,, | Performed by: INTERNAL MEDICINE

## 2020-08-20 PROCEDURE — 71046 X-RAY EXAM CHEST 2 VIEWS: CPT | Mod: TC

## 2020-08-20 PROCEDURE — 99999 PR PBB SHADOW E&M-EST. PATIENT-LVL V: ICD-10-PCS | Mod: PBBFAC,,, | Performed by: INTERNAL MEDICINE

## 2020-08-20 PROCEDURE — 99214 PR OFFICE/OUTPT VISIT, EST, LEVL IV, 30-39 MIN: ICD-10-PCS | Mod: 25,S$GLB,, | Performed by: INTERNAL MEDICINE

## 2020-08-20 PROCEDURE — 1101F PT FALLS ASSESS-DOCD LE1/YR: CPT | Mod: CPTII,S$GLB,, | Performed by: INTERNAL MEDICINE

## 2020-08-20 PROCEDURE — 1159F MED LIST DOCD IN RCRD: CPT | Mod: S$GLB,,, | Performed by: INTERNAL MEDICINE

## 2020-08-20 PROCEDURE — 3077F PR MOST RECENT SYSTOLIC BLOOD PRESSURE >= 140 MM HG: ICD-10-PCS | Mod: CPTII,S$GLB,, | Performed by: INTERNAL MEDICINE

## 2020-08-20 PROCEDURE — 1101F PR PT FALLS ASSESS DOC 0-1 FALLS W/OUT INJ PAST YR: ICD-10-PCS | Mod: CPTII,S$GLB,, | Performed by: INTERNAL MEDICINE

## 2020-08-20 NOTE — PROGRESS NOTES
Rec'd call from pt regarding cancer policy. He said he got information in the mail from them and needed SW help to understand it. He asked if he could come by and welcomed pt to come for SW to help.    [facility] by [facility] to f/u with SW. Was able to identify that the cancer policy deposited money directly into his bank account (he thought they sent money to [facility]). Advised him to call bank and confirm the money was deposited. If not, there is a number on the form for pt call. They also requested documentation for hospitalization in March showing it was for cancer. It was not for cancer treatment (pt says he has never been hospitalized for cancer) so no additional information will be submitted.     He was appreciative of SW help; he had no other needs at this time. Encouraged him to f/u with SW as any needs arise. Will remain available for any additional support/assistance as needed/requested.    Oncology Social Work   Intake  Date of Diagnosis: 08/13/19  MD Assigned: not established with oncologist, seeing Dr. Anna Tomas  Patient currently being followed by outpatient case management: No  Date of referral to social work: 08/20/20  Initial social work contact: 08/20/20  Referral to initial contact timeline: 0  Referral contact method: Phone  Contact method: In person  Date worked: 08/20/20     Intervention  Current Status: Surveillance       Support Systems: Children     Supportive Checklist      Follow Up  No follow-ups on file.

## 2020-08-20 NOTE — PROGRESS NOTES
Subjective:       Patient ID: Erendira Pretty is a 75 y.o. male.  Patient Active Problem List   Diagnosis    Type 2 diabetes mellitus, with long-term current use of insulin    Essential hypertension    BMI 36.0-36.9,adult    PVC (premature ventricular contraction)    Hypothyroid    CAD with remote CABG x 2V (LIMA-LAD and SVG-RCA) in 11/2018    Dryness, eye - Both Eyes    Epiretinal membrane - Both Eyes    PVD (peripheral vascular disease)    Claudication    ED (erectile dysfunction)    Anorectal fistula    Hepatic cirrhosis    BPH (benign prostatic hyperplasia)    DELONTE on CPAP    Asthma    Pseudophakia    Colon polyps    Anal fissure    Esophageal varices    Asbestos exposure    Chest pain, moderate coronary artery risk    Aortic stenosis mild    Urethral polyp    Chronic wheezy bronchitis    Angina pectoris    Left main coronary artery disease    Mixed hyperlipidemia    S/P CABG x 2    Pain in both lower extremities    Chronic pansinusitis    Nasal polyposis    Non-seasonal allergic rhinitis due to pollen    Iron deficiency anemia due to chronic blood loss    Preop cardiovascular exam    GERD (gastroesophageal reflux disease)    HCC (hepatocellular carcinoma)    DNR (do not resuscitate)     Immunization History   Administered Date(s) Administered    Hepatitis A 07/24/2003, 01/12/2004    Hepatitis A, Pediatric/Adolescent, 2 Dose 07/24/2003, 01/12/2004    Hepatitis B 07/24/2003, 08/21/2003, 01/12/2004, 04/23/2014, 05/23/2014    Hepatitis B, Adult 04/23/2014, 05/23/2014, 10/20/2014    Hepatitis B, Pediatric/Adolescent 07/24/2003, 08/21/2003, 01/12/2004    Influenza 11/23/2004, 10/17/2007, 10/20/2008, 10/07/2009, 10/20/2010    Influenza - High Dose - PF (65 years and older) 10/03/2011, 10/29/2013, 10/21/2014, 09/11/2015, 11/10/2016, 10/18/2017, 09/27/2018    Influenza - Trivalent (ADULT) 11/23/2004, 10/17/2007, 10/20/2008, 10/07/2009, 10/20/2010, 09/26/2012    Influenza -  Trivalent - Adjuvanted - PF 2018, 2019    Influenza A (H1N1) 2009 Monovalent - IM - PF 2009    Influenza Split 2012    Pneumococcal Conjugate - 13 Valent 2015    Pneumococcal Polysaccharide - 23 Valent 2014    Tdap 2014    Zoster 2012    Zoster Recombinant 2018, 2019, 2019     Social History     Tobacco Use   Smoking Status Former Smoker    Packs/day: 0.50    Years: 4.00    Pack years: 2.00    Quit date: 1972    Years since quittin.2   Smokeless Tobacco Former User    Types: Chew    Quit date: 1976      Asthma Control Test  In the past 4  weeks, how much of the time did your asthma keep you from getting as much done at work, school or at home?: None of the time  During the past 4 weeks, how often have you had shortness of breath?: Not at all  During the past 4 weeks, how often did your asthma symptoms (wheezing, couging, shortness of breath, chest tightness or pain) wake you up at night or earlier than usual in the morning?: Not at all  During the past 4 weeks, how often have you used your rescue inhaler or nebulizer medication (such as albuterol)?: Not at all  How would you rate your asthma control during the past 4 weeks?: Completely controlled  If your score is 19 or less, your asthma may not be under control: 25     EPWORTH SLEEPINESS SCALE 2020   Sitting and reading 0   Watching TV 3   Sitting, inactive in a public place (e.g. a theatre or a meeting) 0   As a passenger in a car for an hour without a break 0   Lying down to rest in the afternoon when circumstances permit 3   Sitting and talking to someone 0   Sitting quietly after a lunch without alcohol 3   In a car, while stopped for a few minutes in traffic 0   Total score 9          Chief Complaint: Asthma and Sleep Apnea    Mr Maria De Jesus Krishna is 75 y.o.   DELONTE on CPAP, Chr wheezy asthmatic bronchitis, Chr nasal congestion   2019:   Here to review  "results  1. CPAP Download: CPAP 11 cm, Usage > 4 hrs was 80%  Average AHI 2.5  2. CXR: Stable  3. Spirometry: NORMAL: FEV1 improved from 1.93L to 2.54L, FVC 2.92L to 3.41L    Has no SOB, no cough, congestion  Using nebuliser  Currently using CPAP.  No daytime sleepiness  Ep worth score 9.  ACT score 25.  Immunizations are current  Not on Oxygen  Reviewed CXR today: NORMAL, Spirometry FEV1 and FVC improved  Also lost weight from 243lb to 236 lb      Asthma  There is no cough, shortness of breath, sputum production or wheezing. Pertinent negatives include no headaches or orthopnea. His past medical history is significant for asthma.     Review of Systems   Constitutional: Negative for fatigue.   HENT: Negative for voice change and congestion.    Eyes: Negative.    Respiratory: Negative for snoring, cough, sputum production, shortness of breath, wheezing, orthopnea, asthma nighttime symptoms, pleurisy, dyspnea on extertion, use of rescue inhaler, somnolence and Paroxysmal Nocturnal Dyspnea.    Cardiovascular: Negative for leg swelling.   Genitourinary: Negative.    Endocrine: endocrine negative   Musculoskeletal: Negative.    Skin: Negative.    Gastrointestinal: Negative.    Neurological: Negative for headaches.   Psychiatric/Behavioral: Negative.    All other systems reviewed and are negative.      Objective:       Vitals:    08/20/20 1356   BP: (!) 140/80   Pulse: 81   Resp: 20   SpO2: 97%   Weight: 107.2 kg (236 lb 5.3 oz)   Height: 5' 8.11" (1.73 m)     Physical Exam   Constitutional: He is oriented to person, place, and time. Vital signs are normal. He appears well-developed and well-nourished. He is obese.   HENT:   Head: Normocephalic.   Nose: No mucosal edema or rhinorrhea.   Mouth/Throat: Oropharynx is clear and moist. Mallampati Score: III.   Neck: Normal range of motion. Neck supple.   Cardiovascular: Normal rate, regular rhythm and intact distal pulses.   No murmur heard.  Pulmonary/Chest: Normal expansion, " symmetric chest wall expansion, effort normal and breath sounds normal. He has no decreased breath sounds. He has no rhonchi. He has no rales.   Abdominal: Soft. Bowel sounds are normal.   Musculoskeletal: Normal range of motion.         General: No edema.   Lymphadenopathy:     He has no cervical adenopathy.   Neurological: He is alert and oriented to person, place, and time. He has normal reflexes.   Skin: Skin is warm and dry.   Psychiatric: He has a normal mood and affect.   Nursing note and vitals reviewed.    Personal Diagnostic Review        Personal Diagnostic Review  [x]  CXR  EXAMINATION:  XR CHEST PA AND LATERAL     CLINICAL HISTORY:  Obstructive sleep apnea (adult) (pediatric)     TECHNIQUE:  PA and lateral views of the chest were performed.     COMPARISON:  03/01/2020     FINDINGS:  Cardiac silhouette remains mildly enlarged.  Prior sternotomy again noted.    Mild diffuse prominence of the bronchovascular markings and pulmonary interstitium remains unchanged from multiple prior exams.  No focal parenchymal consolidation or definite pleural effusion visualized.  No acute osseous findings demonstrated.     Impression:     Unchanged appearance of the chest as above.  No acute findings    [x]  SPIROMETRY  FEV1: 2.54 L ( 87.9%), FVC 3.41L( 89.0%)  FEV1/FVC 74.38     No flowsheet data found.      Assessment:       Problem List Items Addressed This Visit     Type 2 diabetes mellitus, with long-term current use of insulin (Chronic)     Stable on insulin Solostar         Essential hypertension (Chronic)     Stable on metoprolol, Lisinopril,          DELONTE on CPAP (Chronic)     On CPAP 11 cm  New machine ordered         Relevant Orders    CPAP FOR HOME USE    BMI 36.0-36.9,adult     General weight loss/lifestyle modification strategies discussed (elicit support from others; identify saboteurs; non-food rewards).  Diet interventions: low calorie (1000 kCal/d) deficit diet          Chronic wheezy bronchitis - Primary      ACT score 25  immunizations are current    FEV1 and  FVC improved  Med: SYMBICORT and RESCUE albuterol  FEV1:2.54L ( 87.9%), FVC 3.41L( 89.0%)  FEV1/FVC 74.38               Relevant Orders    X-Ray Chest PA And Lateral    Spirometry without Bronchodilator    Stress test, pulmonary    Mixed hyperlipidemia     STABLE ON ZETIA                Plan:          Requested Prescriptions      No prescriptions requested or ordered in this encounter     Orders Placed This Encounter   Procedures    CPAP FOR HOME USE     Order Specific Question:   Type:     Answer:   CPAP     Order Specific Question:   CPAP setting (cmH20):     Answer:   11     Order Specific Question:   Length of need (1-99 months):     Answer:   99     Order Specific Question:   Humidification:     Answer:   Heated     Order Specific Question:   Type of mask:     Answer:   FFM     Order Specific Question:   Headgear?     Answer:   Yes     Order Specific Question:   Tubing?     Answer:   Yes     Order Specific Question:   Humidifier chamber?     Answer:   Yes     Order Specific Question:   Chin strap?     Answer:   Yes     Order Specific Question:   Filters?     Answer:   Yes    X-Ray Chest PA And Lateral     Standing Status:   Future     Standing Expiration Date:   8/20/2021    Spirometry without Bronchodilator     Standing Status:   Future     Standing Expiration Date:   8/20/2021    Stress test, pulmonary     Standing Status:   Future     Standing Expiration Date:   8/20/2021     Order Specific Question:   Reason for study     Answer:   Functional status         Follow up in about 1 year (around 8/20/2021), or weight loss, Ryder, CXR, download.    This note was prepared using voice recognition system and is likely to have sound alike errors that may have been overlooked even after proof reading.  Please call me with any questions    Discussed diagnosis, its evaluation, treatment and usual course. All questions answered.    Thank you for the courtesy of  participating in the care of this patient    Rishi Molina MD

## 2020-08-20 NOTE — ASSESSMENT & PLAN NOTE
ACT score 25  immunizations are current    FEV1 and  FVC improved  Med: SYMBICORT and RESCUE albuterol  FEV1:2.54L ( 87.9%), FVC 3.41L( 89.0%)  FEV1/FVC 74.38

## 2020-09-21 ENCOUNTER — PATIENT OUTREACH (OUTPATIENT)
Dept: ADMINISTRATIVE | Facility: OTHER | Age: 75
End: 2020-09-21

## 2020-09-21 NOTE — PROGRESS NOTES
Health Maintenance Due   Topic Date Due    Influenza Vaccine (1) 08/01/2020     Updates were requested from care everywhere.  Chart was reviewed for overdue Proactive [facility] Encounters (JUSTICE) topics (CRS, Breast Cancer Screening, Eye exam)  Health Maintenance has been updated.  LINKS immunization registry triggered.  Immunizations were reconciled.

## 2020-09-22 ENCOUNTER — OFFICE VISIT (OUTPATIENT)
Dept: OPHTHALMOLOGY | Facility: CLINIC | Age: 75
End: 2020-09-22
Payer: MEDICARE

## 2020-09-22 DIAGNOSIS — H04.129 DRYNESS, EYE: ICD-10-CM

## 2020-09-22 DIAGNOSIS — Z79.4 CONTROLLED TYPE 2 DIABETES MELLITUS WITHOUT COMPLICATION, WITH LONG-TERM CURRENT USE OF INSULIN: Primary | ICD-10-CM

## 2020-09-22 DIAGNOSIS — H35.373 EPIRETINAL MEMBRANE (ERM) OF BOTH EYES: ICD-10-CM

## 2020-09-22 DIAGNOSIS — Z96.1 PSEUDOPHAKIA: ICD-10-CM

## 2020-09-22 DIAGNOSIS — E11.9 CONTROLLED TYPE 2 DIABETES MELLITUS WITHOUT COMPLICATION, WITH LONG-TERM CURRENT USE OF INSULIN: Primary | ICD-10-CM

## 2020-09-22 DIAGNOSIS — H40.013 OPEN ANGLE WITH BORDERLINE FINDINGS AND LOW GLAUCOMA RISK IN BOTH EYES: ICD-10-CM

## 2020-09-22 PROCEDURE — 92134 CPTRZ OPH DX IMG PST SGM RTA: CPT | Mod: S$GLB,,, | Performed by: OPHTHALMOLOGY

## 2020-09-22 PROCEDURE — 92014 PR EYE EXAM, EST PATIENT,COMPREHESV: ICD-10-PCS | Mod: S$GLB,,, | Performed by: OPHTHALMOLOGY

## 2020-09-22 PROCEDURE — 76514 ULTRASOUND PACHYMETRY: ICD-10-PCS | Mod: S$GLB,,, | Performed by: OPHTHALMOLOGY

## 2020-09-22 PROCEDURE — 99999 PR PBB SHADOW E&M-EST. PATIENT-LVL II: ICD-10-PCS | Mod: PBBFAC,,, | Performed by: OPHTHALMOLOGY

## 2020-09-22 PROCEDURE — 76514 ECHO EXAM OF EYE THICKNESS: CPT | Mod: S$GLB,,, | Performed by: OPHTHALMOLOGY

## 2020-09-22 PROCEDURE — 99999 PR PBB SHADOW E&M-EST. PATIENT-LVL II: CPT | Mod: PBBFAC,,, | Performed by: OPHTHALMOLOGY

## 2020-09-22 PROCEDURE — 92014 COMPRE OPH EXAM EST PT 1/>: CPT | Mod: S$GLB,,, | Performed by: OPHTHALMOLOGY

## 2020-09-22 PROCEDURE — 92134 POSTERIOR SEGMENT OCT RETINA (OCULAR COHERENCE TOMOGRAPHY)-BOTH EYES: ICD-10-PCS | Mod: S$GLB,,, | Performed by: OPHTHALMOLOGY

## 2020-09-22 RX ORDER — ERYTHROMYCIN 5 MG/G
OINTMENT OPHTHALMIC 4 TIMES DAILY
Qty: 1 TUBE | Refills: 3 | Status: SHIPPED | OUTPATIENT
Start: 2020-09-22 | End: 2021-08-16

## 2020-09-22 NOTE — PATIENT INSTRUCTIONS
"Dry Eye Instruction Sheet  Dry Eye is a common eye condition in aging eyes or in patients with other medical conditions.  The symptoms of dry eye include burning, stinging, redness, aranza or jacky sensation, watering, or feeling as if something is in your eye.  These symptoms are often worse in situations that increase drying of eyes such as reading, lengthy use of eyes, dry weather, using a fan, wearing a CPAP, or often worse at the end of the day.  Dry eyes occur when the baseline tear production is reduced; therefore treatment is based on increasing moisture in the eyes.      --Artificial tear drops should be placed in both eyes 3-4 times daily and may be increased or decreased as needed. Do not use anything stating "to get the red out".  You can find artificial tear drops over the counter at any drug store. Examples are systane, genteal, theratears, refresh.         Use erythromycin ointment 4 times a day for 2 weeks, especially at bedtime.  After 2 weeks use as needed.  Refills were given.  Sent to [facility].    "

## 2020-09-22 NOTE — PROGRESS NOTES
===============================  Erendira Pretty,  9/22/2020 today   75 y.o. male   Last visit [facility]: :1/6/2020   Last visit eye dept. Visit date not found  [facility]:  Uncorrected distance visual acuity was 20/25 in the right eye and 20/25 in the left eye.  Tonometry     Tonometry (Applanation, 8:52 AM)       Right Left    Pressure 16 16               Not recorded         Not recorded         Not recorded        Chief Complaint   Patient presents with    Diabetic Eye Exam    Eye Problem     OD water like a faucet. Drops given last year did not help but ointment did.       ________________  9/22/2020 today  HPI     Eye Problem      Additional comments: OD water like a faucet. Drops given last year did   not help but ointment did.              Comments     Insulin dependant DM since 2011  MINI ERM OU  DRY EYES  ON RESTASIS  OU LL PLUGS            Last edited by AILIN Tran MD on 9/22/2020  8:52 AM. (History)      Problem List Items Addressed This Visit        Eye/Vision problems    Epiretinal membrane - Both Eyes  --stable oct, minimal    Relevant Orders    Posterior Segment OCT Retina-Both eyes (Completed)    Pseudophakia       Other    Dryness, eye - Both Eyes  --episodic tearing OD daily for years  2 hours duration  Sx c/w rces  Previous plugs  Better after ointment last visit  Not using at's  Recommend ointment qid exp hs for 2 weeks  Then as needed  recommend at's frequently      Other Visit Diagnoses     Controlled type 2 diabetes mellitus without complication, with long-term current use of insulin    -  Primary  -no bdr        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

## 2020-10-06 ENCOUNTER — TELEPHONE (OUTPATIENT)
Dept: CARDIOLOGY | Facility: CLINIC | Age: 75
End: 2020-10-06

## 2020-10-06 NOTE — TELEPHONE ENCOUNTER
Rtc and left v/m that I have not rec'd any request from [facility] pharmacy. I did all rx  rec'd yesterday before I left. Please do not wait. If you do not get med within 2 days of request , please call. Sending rx to Dr Gallardo to sign. Cm  ----- Message from Zuly Alvarenga-Kat sent at 10/6/2020 10:58 AM CDT -----  Regarding: Lisinopril  Contact: patient  Patient states that the pharmacy has been faxing for a week for refill on Lisinopril- [facility] pharm, patient needs asap, please call him back at [phone]

## 2020-10-07 RX ORDER — LISINOPRIL 10 MG/1
10 TABLET ORAL DAILY
Qty: 30 TABLET | Refills: 6 | OUTPATIENT
Start: 2020-10-07 | End: 2021-09-29

## 2020-11-12 ENCOUNTER — OFFICE VISIT (OUTPATIENT)
Dept: SLEEP MEDICINE | Facility: CLINIC | Age: 75
End: 2020-11-12
Payer: MEDICARE

## 2020-11-12 ENCOUNTER — TELEPHONE (OUTPATIENT)
Dept: PULMONOLOGY | Facility: CLINIC | Age: 75
End: 2020-11-12

## 2020-11-12 VITALS
HEIGHT: 68 IN | SYSTOLIC BLOOD PRESSURE: 138 MMHG | OXYGEN SATURATION: 99 % | DIASTOLIC BLOOD PRESSURE: 80 MMHG | BODY MASS INDEX: 35.92 KG/M2 | HEART RATE: 69 BPM | RESPIRATION RATE: 20 BRPM | WEIGHT: 237 LBS

## 2020-11-12 DIAGNOSIS — Z77.090 ASBESTOS EXPOSURE: ICD-10-CM

## 2020-11-12 DIAGNOSIS — Z79.4 TYPE 2 DIABETES MELLITUS WITH CHRONIC KIDNEY DISEASE, WITH LONG-TERM CURRENT USE OF INSULIN, UNSPECIFIED CKD STAGE: Chronic | ICD-10-CM

## 2020-11-12 DIAGNOSIS — G47.33 OSA ON CPAP: Chronic | ICD-10-CM

## 2020-11-12 DIAGNOSIS — E03.9 ACQUIRED HYPOTHYROIDISM: ICD-10-CM

## 2020-11-12 DIAGNOSIS — E11.22 TYPE 2 DIABETES MELLITUS WITH CHRONIC KIDNEY DISEASE, WITH LONG-TERM CURRENT USE OF INSULIN, UNSPECIFIED CKD STAGE: Chronic | ICD-10-CM

## 2020-11-12 DIAGNOSIS — J42 CHRONIC WHEEZY BRONCHITIS: Primary | ICD-10-CM

## 2020-11-12 DIAGNOSIS — E78.2 MIXED HYPERLIPIDEMIA: ICD-10-CM

## 2020-11-12 DIAGNOSIS — I10 ESSENTIAL HYPERTENSION: Chronic | ICD-10-CM

## 2020-11-12 PROCEDURE — 1157F ADVNC CARE PLAN IN RCRD: CPT | Mod: S$GLB,,, | Performed by: INTERNAL MEDICINE

## 2020-11-12 PROCEDURE — 3075F SYST BP GE 130 - 139MM HG: CPT | Mod: CPTII,S$GLB,, | Performed by: INTERNAL MEDICINE

## 2020-11-12 PROCEDURE — 3044F PR MOST RECENT HEMOGLOBIN A1C LEVEL <7.0%: ICD-10-PCS | Mod: CPTII,S$GLB,, | Performed by: INTERNAL MEDICINE

## 2020-11-12 PROCEDURE — 3008F BODY MASS INDEX DOCD: CPT | Mod: CPTII,S$GLB,, | Performed by: INTERNAL MEDICINE

## 2020-11-12 PROCEDURE — 3079F DIAST BP 80-89 MM HG: CPT | Mod: CPTII,S$GLB,, | Performed by: INTERNAL MEDICINE

## 2020-11-12 PROCEDURE — 99214 PR OFFICE/OUTPT VISIT, EST, LEVL IV, 30-39 MIN: ICD-10-PCS | Mod: S$GLB,,, | Performed by: INTERNAL MEDICINE

## 2020-11-12 PROCEDURE — 1159F PR MEDICATION LIST DOCUMENTED IN MEDICAL RECORD: ICD-10-PCS | Mod: S$GLB,,, | Performed by: INTERNAL MEDICINE

## 2020-11-12 PROCEDURE — 3075F PR MOST RECENT SYSTOLIC BLOOD PRESS GE 130-139MM HG: ICD-10-PCS | Mod: CPTII,S$GLB,, | Performed by: INTERNAL MEDICINE

## 2020-11-12 PROCEDURE — 99999 PR PBB SHADOW E&M-EST. PATIENT-LVL IV: CPT | Mod: PBBFAC,,, | Performed by: INTERNAL MEDICINE

## 2020-11-12 PROCEDURE — 1157F PR ADVANCE CARE PLAN OR EQUIV PRESENT IN MEDICAL RECORD: ICD-10-PCS | Mod: S$GLB,,, | Performed by: INTERNAL MEDICINE

## 2020-11-12 PROCEDURE — 99214 OFFICE O/P EST MOD 30 MIN: CPT | Mod: S$GLB,,, | Performed by: INTERNAL MEDICINE

## 2020-11-12 PROCEDURE — 3044F HG A1C LEVEL LT 7.0%: CPT | Mod: CPTII,S$GLB,, | Performed by: INTERNAL MEDICINE

## 2020-11-12 PROCEDURE — 3008F PR BODY MASS INDEX (BMI) DOCUMENTED: ICD-10-PCS | Mod: CPTII,S$GLB,, | Performed by: INTERNAL MEDICINE

## 2020-11-12 PROCEDURE — 1159F MED LIST DOCD IN RCRD: CPT | Mod: S$GLB,,, | Performed by: INTERNAL MEDICINE

## 2020-11-12 PROCEDURE — 99999 PR PBB SHADOW E&M-EST. PATIENT-LVL IV: ICD-10-PCS | Mod: PBBFAC,,, | Performed by: INTERNAL MEDICINE

## 2020-11-12 PROCEDURE — 3079F PR MOST RECENT DIASTOLIC BLOOD PRESSURE 80-89 MM HG: ICD-10-PCS | Mod: CPTII,S$GLB,, | Performed by: INTERNAL MEDICINE

## 2020-11-12 NOTE — ASSESSMENT & PLAN NOTE
Asthma ROS:   She is taking medications regularly as instructed, no medication side effects noted, no significant ongoing wheezing or shortness of breath.     New concerns: None.   Regime: Med: SYMBICORT and RESCUE albuterol  Exam: appears well, vitals normal, no respiratory distress, acyanotic, normal RR, chest clear, no wheezing, crepitations, rhonchi, normal symmetric air entry.     Assessment: Asthma poorly controlled.     Plan: Continue SYMBICORT. CONTINUE ALBUTEROL. Orders as documented in EMR.Re evaluate in 12 month

## 2020-11-12 NOTE — PROGRESS NOTES
Subjective:       Patient ID: Erendira Pretty is a 75 y.o. male.  Patient Active Problem List   Diagnosis    Type 2 diabetes mellitus, with long-term current use of insulin    Essential hypertension    BMI 36.0-36.9,adult    PVC (premature ventricular contraction)    Hypothyroid    CAD with remote CABG x 2V (LIMA-LAD and SVG-RCA) in 11/2018    Dryness, eye - Both Eyes    Epiretinal membrane - Both Eyes    PVD (peripheral vascular disease)    Claudication    ED (erectile dysfunction)    Anorectal fistula    Hepatic cirrhosis    BPH (benign prostatic hyperplasia)    DELONTE on CPAP    Asthma    Pseudophakia    Colon polyps    Anal fissure    Esophageal varices    Asbestos exposure    Chest pain, moderate coronary artery risk    Aortic stenosis mild    Urethral polyp    Chronic wheezy bronchitis    Angina pectoris    Left main coronary artery disease    Mixed hyperlipidemia    S/P CABG x 2    Pain in both lower extremities    Chronic pansinusitis    Nasal polyposis    Non-seasonal allergic rhinitis due to pollen    Iron deficiency anemia due to chronic blood loss    Preop cardiovascular exam    GERD (gastroesophageal reflux disease)    HCC (hepatocellular carcinoma)    DNR (do not resuscitate)    Open angle with borderline findings and low glaucoma risk in both eyes     Immunization History   Administered Date(s) Administered    Hepatitis A 07/24/2003, 01/12/2004    Hepatitis A, Pediatric/Adolescent, 2 Dose 07/24/2003, 01/12/2004    Hepatitis B 07/24/2003, 08/21/2003, 01/12/2004, 04/23/2014, 05/23/2014    Hepatitis B, Adult 04/23/2014, 05/23/2014, 10/20/2014    Hepatitis B, Pediatric/Adolescent 07/24/2003, 08/21/2003, 01/12/2004    Influenza 11/23/2004, 10/17/2007, 10/20/2008, 10/07/2009, 10/20/2010    Influenza (FLUAD) - Trivalent - Adjuvanted - PF (65+) 09/27/2018, 09/24/2019    Influenza - High Dose - PF (65 years and older) 10/03/2011, 10/29/2013, 10/21/2014, 09/11/2015,  11/10/2016, 10/18/2017, 2018    Influenza - Trivalent (ADULT) 2004, 10/17/2007, 10/20/2008, 10/07/2009, 10/20/2010, 2012    Influenza A (H1N1) 2009 Monovalent - IM - PF 2009    Influenza Split 2012    Pneumococcal Conjugate - 13 Valent 2015    Pneumococcal Polysaccharide - 23 Valent 2014    Tdap 2014    Zoster 2012    Zoster Recombinant 2018, 2019, 2019     Social History     Tobacco Use   Smoking Status Former Smoker    Packs/day: 0.50    Years: 4.00    Pack years: 2.00    Quit date: 1972    Years since quittin.4   Smokeless Tobacco Former User    Types: Chew    Quit date: 1976      Asthma Control Test  In the past 4  weeks, how much of the time did your asthma keep you from getting as much done at work, school or at home?: None of the time  During the past 4 weeks, how often have you had shortness of breath?: Not at all  During the past 4 weeks, how often did your asthma symptoms (wheezing, couging, shortness of breath, chest tightness or pain) wake you up at night or earlier than usual in the morning?: Not at all  During the past 4 weeks, how often have you used your rescue inhaler or nebulizer medication (such as albuterol)?: Not at all  How would you rate your asthma control during the past 4 weeks?: Well controlled  If your score is 19 or less, your asthma may not be under control: 24      EPWORTH SLEEPINESS SCALE 2020   Sitting and reading 0   Watching TV 3   Sitting, inactive in a public place (e.g. a theatre or a meeting) 0   As a passenger in a car for an hour without a break 0   Lying down to rest in the afternoon when circumstances permit 3   Sitting and talking to someone 0   Sitting quietly after a lunch without alcohol 0   In a car, while stopped for a few minutes in traffic 0   Total score 6      Asthma Control Test  In the past 4  weeks, how much of the time did your asthma keep you from  getting as much done at work, school or at home?: None of the time  During the past 4 weeks, how often have you had shortness of breath?: Not at all  During the past 4 weeks, how often did your asthma symptoms (wheezing, couging, shortness of breath, chest tightness or pain) wake you up at night or earlier than usual in the morning?: Not at all  During the past 4 weeks, how often have you used your rescue inhaler or nebulizer medication (such as albuterol)?: Not at all  How would you rate your asthma control during the past 4 weeks?: Well controlled  If your score is 19 or less, your asthma may not be under control: 24      Chief Complaint: Sleep Apnea, Asthma, asbestos exposure, and Bronchitis    Mr Maria De Jesus Krishna is 75 y.o.   DELONTE on CPAP, Chr wheezy asthmatic bronchitis, Chr nasal congestion  LV 08/20/2020  Has new CPAP machine  Here to review results  1. CPAP Download: CPAP 11 cm, Usage > 4 hrs was 76.9%  Average AHI 3.0   Has no SOB, no cough, congestion  Using nebuliser  Currently using CPAP.  No daytime sleepiness  Ep worth score 6.  ACT score 24.  Immunizations are current        Asthma  There is no cough, shortness of breath, sputum production or wheezing. Pertinent negatives include no headaches or orthopnea. His past medical history is significant for asthma.     Review of Systems   Constitutional: Negative for fatigue.   HENT: Negative for voice change and congestion.    Eyes: Negative.    Respiratory: Negative for snoring, cough, sputum production, shortness of breath, wheezing, orthopnea, asthma nighttime symptoms, pleurisy, dyspnea on extertion, use of rescue inhaler, somnolence and Paroxysmal Nocturnal Dyspnea.    Cardiovascular: Negative for leg swelling.   Genitourinary: Negative.    Endocrine: endocrine negative   Musculoskeletal: Negative.    Skin: Negative.    Gastrointestinal: Negative.    Neurological: Negative for headaches.   Psychiatric/Behavioral: Negative.    All other systems reviewed and  "are negative.      Objective:       Vitals:    11/12/20 0820   BP: 138/80   Pulse: 69   Resp: 20   SpO2: 99%   Weight: 107.5 kg (236 lb 15.9 oz)   Height: 5' 8" (1.727 m)     Physical Exam   Constitutional: He is oriented to person, place, and time. Vital signs are normal. He appears well-developed and well-nourished. He is obese.   HENT:   Head: Normocephalic.   Nose: No mucosal edema or rhinorrhea.   Mouth/Throat: Oropharynx is clear and moist. Mallampati Score: III.   Neck: Normal range of motion. Neck supple.   Cardiovascular: Normal rate, regular rhythm and intact distal pulses.   No murmur heard.  Pulmonary/Chest: Normal expansion, symmetric chest wall expansion, effort normal and breath sounds normal. He has no decreased breath sounds. He has no rhonchi. He has no rales.   Abdominal: Soft. Bowel sounds are normal.   Musculoskeletal: Normal range of motion.         General: No edema.   Lymphadenopathy:     He has no cervical adenopathy.   Neurological: He is alert and oriented to person, place, and time. He has normal reflexes.   Skin: Skin is warm and dry.   Psychiatric: He has a normal mood and affect.   Nursing note and vitals reviewed.    Personal Diagnostic Review        Personal Diagnostic Review  [x]  DOWNLOAD    10/13/2020 - 11/11/2020  YOB: 1945  Mask:  Compliance Summary  10/13/2020 - 11/11/2020 (30 days)  Days with Device Usage 23 days  Days without Device Usage 7 days  Percent Days with Device Usage 76.7%  Cumulative Usage 8 days 16 hrs. 53 mins. 21 secs.  Maximum Usage (1 Day) 10 hrs. 39 mins. 6 secs.  Average Usage (All Days) 6 hrs. 57 mins. 46 secs.  Average Usage (Days Used) 9 hrs. 4 mins. 55 secs.  Minimum Usage (1 Day) 7 hrs. 47 mins. 50 secs.  Percent of Days with Usage >= 4 Hours 76.7%  Percent of Days with Usage < 4 Hours 23.3%  Date Range  Total Blower Time 8 days 18 hrs. 23 mins. 34 secs.  CPAP Summary  Average Time in Large Leak Per Day 52 secs.  Average AHI 3.0  CPAP 11.0 " cmH2O    [x]  SPIROMETRY        No flowsheet data found.      Assessment:       Problem List Items Addressed This Visit     Asbestos exposure    Type 2 diabetes mellitus, with long-term current use of insulin (Chronic)     STABLE ON INSULIN SOLOSTAR         Essential hypertension (Chronic)     Stable on metoprolol, Lisinopril,          DELONTE on CPAP (Chronic)     [place] score 6  Usage > 4 hrs was 76.7%  AHI was 3.0  CPAP 11 cm  Using and benefits         BMI 36.0-36.9,adult     General weight loss/lifestyle modification strategies discussed (elicit support from others; identify saboteurs; non-food rewards).  Diet interventions: low calorie (1000 kCal/d) deficit diet          Hypothyroid     Stable SYTHROID         Chronic wheezy bronchitis - Primary     Asthma ROS:   She is taking medications regularly as instructed, no medication side effects noted, no significant ongoing wheezing or shortness of breath.     New concerns: None.   Regime: Med: SYMBICORT and RESCUE albuterol  Exam: appears well, vitals normal, no respiratory distress, acyanotic, normal RR, chest clear, no wheezing, crepitations, rhonchi, normal symmetric air entry.     Assessment: Asthma poorly controlled.     Plan: Continue SYMBICORT. CONTINUE ALBUTEROL. Orders as documented in EMR.Re evaluate in 12 month                 Mixed hyperlipidemia     STABLE ON ZETIA                Plan:          Requested Prescriptions      No prescriptions requested or ordered in this encounter     No orders of the defined types were placed in this encounter.        Follow up in about 1 year (around 11/12/2021), or CXR, [place], download, weight loss.    This note was prepared using voice recognition system and is likely to have sound alike errors that may have been overlooked even after proof reading.  Please call me with any questions    Discussed diagnosis, its evaluation, treatment and usual course. All questions answered.    Thank you for the courtesy of participating  in the care of this patient    Rishi Molina MD

## 2020-11-13 ENCOUNTER — TELEPHONE (OUTPATIENT)
Dept: INTERNAL MEDICINE | Facility: CLINIC | Age: 75
End: 2020-11-13

## 2020-11-13 NOTE — TELEPHONE ENCOUNTER
----- Message from Carina Romo sent at 11/13/2020  3:50 PM CST -----  Regarding: insurance called  Good evening,      Pt insurance called due to dx of diabetes and they stated that he is not currently receiving treatment for it. They can be reached at [phone] ref# [id number]        Thanks,  Carina Romo

## 2020-11-13 NOTE — TELEPHONE ENCOUNTER
I tried to return a call back to Carina Romo regarding diabetes care for the pt . There was no answer so, I let a vm asking that she call back. // kah

## 2020-11-30 ENCOUNTER — TELEPHONE (OUTPATIENT)
Dept: INTERNAL MEDICINE | Facility: CLINIC | Age: 75
End: 2020-11-30

## 2020-11-30 NOTE — TELEPHONE ENCOUNTER
Spoke with patient. Informed patient that he will receive a call to reschedule appt date. Glenda CHOUDHARY called patient and rescheduled date to 12/21/20 at 0800 at patient's request.//tim

## 2020-11-30 NOTE — TELEPHONE ENCOUNTER
----- Message from Karime Machado sent at 11/30/2020  8:32 AM CST -----  Contact: [phone]/SELF  cough with clear sputum  Type:  Sooner Apoointment Request    Caller is requesting a sooner appointment.  Caller declined first available appointment listed below.  Caller will not accept being placed on the waitlist and is requesting a message be sent to doctor.  Name of Caller:Patient  When is the first available appointment?02-21-21  Symptoms:  Would the patient rather a call back or a response via [facility]? Call Back  Best Call Back Number:[phone]  Additional Information: Patient would like to reschedule appointment time due to time conflict of that day. Patient can't travel from [facility] to  within time frame        Carrol/PJ

## 2020-12-14 RX ORDER — LEVOCETIRIZINE DIHYDROCHLORIDE 5 MG/1
5 TABLET, FILM COATED ORAL NIGHTLY
Qty: 30 TABLET | Refills: 11 | Status: SHIPPED | OUTPATIENT
Start: 2020-12-14 | End: 2021-01-19 | Stop reason: SDUPTHER

## 2020-12-15 ENCOUNTER — LAB VISIT (OUTPATIENT)
Dept: LAB | Facility: HOSPITAL | Age: 75
End: 2020-12-15
Attending: FAMILY MEDICINE
Payer: MEDICARE

## 2020-12-15 DIAGNOSIS — Z79.4 TYPE 2 DIABETES MELLITUS WITH CHRONIC KIDNEY DISEASE, WITH LONG-TERM CURRENT USE OF INSULIN, UNSPECIFIED CKD STAGE: ICD-10-CM

## 2020-12-15 DIAGNOSIS — E11.22 TYPE 2 DIABETES MELLITUS WITH CHRONIC KIDNEY DISEASE, WITH LONG-TERM CURRENT USE OF INSULIN, UNSPECIFIED CKD STAGE: ICD-10-CM

## 2020-12-15 PROCEDURE — 84443 ASSAY THYROID STIM HORMONE: CPT

## 2020-12-15 PROCEDURE — 84439 ASSAY OF FREE THYROXINE: CPT

## 2020-12-15 PROCEDURE — 36415 COLL VENOUS BLD VENIPUNCTURE: CPT | Mod: PO

## 2020-12-15 PROCEDURE — 80061 LIPID PANEL: CPT

## 2020-12-15 PROCEDURE — 80053 COMPREHEN METABOLIC PANEL: CPT

## 2020-12-15 PROCEDURE — 83036 HEMOGLOBIN GLYCOSYLATED A1C: CPT

## 2020-12-16 LAB
ALBUMIN SERPL BCP-MCNC: 3.7 G/DL (ref 3.5–5.2)
ALP SERPL-CCNC: 82 U/L (ref 55–135)
ALT SERPL W/O P-5'-P-CCNC: 24 U/L (ref 10–44)
ANION GAP SERPL CALC-SCNC: 11 MMOL/L (ref 8–16)
AST SERPL-CCNC: 29 U/L (ref 10–40)
BILIRUB SERPL-MCNC: 0.5 MG/DL (ref 0.1–1)
BUN SERPL-MCNC: 19 MG/DL (ref 8–23)
CALCIUM SERPL-MCNC: 9.2 MG/DL (ref 8.7–10.5)
CHLORIDE SERPL-SCNC: 103 MMOL/L (ref 95–110)
CHOLEST SERPL-MCNC: 188 MG/DL (ref 120–199)
CHOLEST/HDLC SERPL: 3.3 {RATIO} (ref 2–5)
CO2 SERPL-SCNC: 27 MMOL/L (ref 23–29)
CREAT SERPL-MCNC: 1.1 MG/DL (ref 0.5–1.4)
EST. GFR  (AFRICAN AMERICAN): >60 ML/MIN/1.73 M^2
EST. GFR  (NON AFRICAN AMERICAN): >60 ML/MIN/1.73 M^2
ESTIMATED AVG GLUCOSE: 140 MG/DL (ref 68–131)
GLUCOSE SERPL-MCNC: 125 MG/DL (ref 70–110)
HBA1C MFR BLD HPLC: 6.5 % (ref 4–5.6)
HDLC SERPL-MCNC: 57 MG/DL (ref 40–75)
HDLC SERPL: 30.3 % (ref 20–50)
LDLC SERPL CALC-MCNC: 106.4 MG/DL (ref 63–159)
NONHDLC SERPL-MCNC: 131 MG/DL
POTASSIUM SERPL-SCNC: 4.4 MMOL/L (ref 3.5–5.1)
PROT SERPL-MCNC: 7.8 G/DL (ref 6–8.4)
SODIUM SERPL-SCNC: 141 MMOL/L (ref 136–145)
T4 FREE SERPL-MCNC: 1.13 NG/DL (ref 0.71–1.51)
TRIGL SERPL-MCNC: 123 MG/DL (ref 30–150)
TSH SERPL DL<=0.005 MIU/L-ACNC: 0.26 UIU/ML (ref 0.4–4)

## 2020-12-18 ENCOUNTER — TELEPHONE (OUTPATIENT)
Dept: OTOLARYNGOLOGY | Facility: CLINIC | Age: 75
End: 2020-12-18

## 2020-12-18 NOTE — TELEPHONE ENCOUNTER
----- Message from Rosie Oconnor sent at 12/18/2020  3:27 PM CST -----  Contact: Erendira  Patient called regarding whether or not he should fast for the appointment. Please call back at [phone].    Thanks,  Rosie Oconnor

## 2020-12-23 ENCOUNTER — OFFICE VISIT (OUTPATIENT)
Dept: OTOLARYNGOLOGY | Facility: CLINIC | Age: 75
End: 2020-12-23
Payer: MEDICARE

## 2020-12-23 VITALS — TEMPERATURE: 98 F | WEIGHT: 236 LBS | HEIGHT: 68 IN | BODY MASS INDEX: 35.77 KG/M2

## 2020-12-23 DIAGNOSIS — H69.92 DYSFUNCTION OF LEFT EUSTACHIAN TUBE: Primary | ICD-10-CM

## 2020-12-23 DIAGNOSIS — J30.89 NON-SEASONAL ALLERGIC RHINITIS, UNSPECIFIED TRIGGER: ICD-10-CM

## 2020-12-23 DIAGNOSIS — J32.4 CHRONIC PANSINUSITIS: ICD-10-CM

## 2020-12-23 PROCEDURE — 1159F PR MEDICATION LIST DOCUMENTED IN MEDICAL RECORD: ICD-10-PCS | Mod: S$GLB,,, | Performed by: PHYSICIAN ASSISTANT

## 2020-12-23 PROCEDURE — 3008F PR BODY MASS INDEX (BMI) DOCUMENTED: ICD-10-PCS | Mod: CPTII,S$GLB,, | Performed by: PHYSICIAN ASSISTANT

## 2020-12-23 PROCEDURE — 99999 PR PBB SHADOW E&M-EST. PATIENT-LVL III: CPT | Mod: PBBFAC,,, | Performed by: PHYSICIAN ASSISTANT

## 2020-12-23 PROCEDURE — 99999 PR PBB SHADOW E&M-EST. PATIENT-LVL III: ICD-10-PCS | Mod: PBBFAC,,, | Performed by: PHYSICIAN ASSISTANT

## 2020-12-23 PROCEDURE — 1157F PR ADVANCE CARE PLAN OR EQUIV PRESENT IN MEDICAL RECORD: ICD-10-PCS | Mod: S$GLB,,, | Performed by: PHYSICIAN ASSISTANT

## 2020-12-23 PROCEDURE — 1126F PR PAIN SEVERITY QUANTIFIED, NO PAIN PRESENT: ICD-10-PCS | Mod: S$GLB,,, | Performed by: PHYSICIAN ASSISTANT

## 2020-12-23 PROCEDURE — 1126F AMNT PAIN NOTED NONE PRSNT: CPT | Mod: S$GLB,,, | Performed by: PHYSICIAN ASSISTANT

## 2020-12-23 PROCEDURE — 99213 OFFICE O/P EST LOW 20 MIN: CPT | Mod: S$GLB,,, | Performed by: PHYSICIAN ASSISTANT

## 2020-12-23 PROCEDURE — 1157F ADVNC CARE PLAN IN RCRD: CPT | Mod: S$GLB,,, | Performed by: PHYSICIAN ASSISTANT

## 2020-12-23 PROCEDURE — 99213 PR OFFICE/OUTPT VISIT, EST, LEVL III, 20-29 MIN: ICD-10-PCS | Mod: S$GLB,,, | Performed by: PHYSICIAN ASSISTANT

## 2020-12-23 PROCEDURE — 3008F BODY MASS INDEX DOCD: CPT | Mod: CPTII,S$GLB,, | Performed by: PHYSICIAN ASSISTANT

## 2020-12-23 PROCEDURE — 1159F MED LIST DOCD IN RCRD: CPT | Mod: S$GLB,,, | Performed by: PHYSICIAN ASSISTANT

## 2020-12-23 RX ORDER — INFLUENZA A VIRUS A/MICHIGAN/45/2015 X-275 (H1N1) ANTIGEN (FORMALDEHYDE INACTIVATED), INFLUENZA A VIRUS A/SINGAPORE/INFIMH-16-0019/2016 IVR-186 (H3N2) ANTIGEN (FORMALDEHYDE INACTIVATED), INFLUENZA B VIRUS B/PHUKET/3073/2013 ANTIGEN (FORMALDEHYDE INACTIVATED), AND INFLUENZA B VIRUS B/MARYLAND/15/2016 BX-69A ANTIGEN (FORMALDEHYDE INACTIVATED) 60; 60; 60; 60 UG/.7ML; UG/.7ML; UG/.7ML; UG/.7ML
INJECTION, SUSPENSION INTRAMUSCULAR
COMMUNITY
Start: 2020-10-01 | End: 2021-01-26

## 2020-12-23 RX ORDER — MONTELUKAST SODIUM 10 MG/1
10 TABLET ORAL DAILY
COMMUNITY
Start: 2020-11-28 | End: 2021-01-04

## 2020-12-23 RX ORDER — FLUTICASONE PROPIONATE 50 MCG
2 SPRAY, SUSPENSION (ML) NASAL DAILY
Qty: 18.2 ML | Refills: 6 | Status: ON HOLD | OUTPATIENT
Start: 2020-12-23 | End: 2024-01-26 | Stop reason: HOSPADM

## 2020-12-23 RX ORDER — AZELASTINE 1 MG/ML
1 SPRAY, METERED NASAL 2 TIMES DAILY
Qty: 30 ML | Refills: 0 | Status: SHIPPED | OUTPATIENT
Start: 2020-12-23 | End: 2021-12-23

## 2020-12-23 NOTE — PROGRESS NOTES
"Subjective:   Patient ID: Erendira Pretty is a 75 y.o. male.    Chief Complaint: Follow-up (improvement no compliants)    Mr. Pretty is a very pleasant 74 yo male here to see me today for follow up of ETD and SA. He has a history of chronic pansinusitis and has been seen by Dr. Parkinson in the past. He takes Astelin and xyzal regularly.     Review of patient's allergies indicates:   Allergen Reactions    Lipitor [atorvastatin] Other (See Comments)     Muscle aches and pains as well as fatigue.     Niacin preparations Other (See Comments)     Causes broken blood vessels and bruises at 4 times normal dose.    Iodinated contrast media Hives     Tachycardia    Omeprazole Hives and Itching    Prilosec [omeprazole magnesium] Hives and Itching           Review of Systems   Constitutional: Negative for fatigue, fever and unexpected weight change.   HENT: Positive for postnasal drip and rhinorrhea. Negative for congestion, ear discharge, ear pain, facial swelling, hearing loss, nosebleeds, sinus pressure, sneezing, sore throat, tinnitus, trouble swallowing and voice change.    Eyes: Negative for discharge, redness and itching.   Respiratory: Negative for cough, choking, shortness of breath and wheezing.    Cardiovascular: Negative for chest pain and palpitations.   Gastrointestinal: Negative for abdominal pain.        No reflux.   Musculoskeletal: Negative for neck pain.   Neurological: Negative for dizziness, facial asymmetry, light-headedness and headaches.   Hematological: Negative for adenopathy. Does not bruise/bleed easily.   Psychiatric/Behavioral: Negative for agitation, behavioral problems, confusion and decreased concentration.         Objective:   Temp 98.1 °F (36.7 °C) (Temporal)   Ht 5' 8" (1.727 m)   Wt 107 kg (236 lb)   BMI 35.88 kg/m²     Physical Exam  Constitutional:       General: He is not in acute distress.     Appearance: He is well-developed.   HENT:      Head: Normocephalic and atraumatic.      Jaw: " No trismus.      Right Ear: Hearing, tympanic membrane, ear canal and external ear normal.      Left Ear: Hearing, tympanic membrane, ear canal and external ear normal.      Nose: Nose normal. No nasal deformity, septal deviation, mucosal edema or rhinorrhea.      Mouth/Throat:      Dentition: Normal dentition.      Pharynx: Uvula midline. No oropharyngeal exudate or uvula swelling.   Eyes:      General: No scleral icterus.     Conjunctiva/sclera: Conjunctivae normal.      Right eye: Right conjunctiva is not injected. No chemosis.     Left eye: Left conjunctiva is not injected. No chemosis.     Pupils: Pupils are equal, round, and reactive to light.   Neck:      Thyroid: No thyroid mass or thyromegaly.      Trachea: Trachea and phonation normal. No tracheal tenderness or tracheal deviation.   Pulmonary:      Effort: Pulmonary effort is normal. No accessory muscle usage or respiratory distress.      Breath sounds: No stridor.   Lymphadenopathy:      Head:      Right side of head: No submental, submandibular, preauricular or posterior auricular adenopathy.      Left side of head: No submental, submandibular, preauricular or posterior auricular adenopathy.      Cervical: No cervical adenopathy.      Right cervical: No superficial or deep cervical adenopathy.     Left cervical: No superficial or deep cervical adenopathy.   Skin:     General: Skin is warm and dry.      Findings: No erythema or rash.   Neurological:      Mental Status: He is alert and oriented to person, place, and time.      Cranial Nerves: No cranial nerve deficit.   Psychiatric:         Behavior: Behavior normal.         Thought Content: Thought content normal.          Assessment:     1. Dysfunction of left eustachian tube    2. Chronic pansinusitis    3. Non-seasonal allergic rhinitis, unspecified trigger        Plan:     Dysfunction of left eustachian tube    Chronic pansinusitis    Non-seasonal allergic rhinitis, unspecified trigger    Other  orders  -     fluticasone propionate (FLONASE) 50 mcg/actuation nasal spray; 2 sprays (100 mcg total) by Each Nostril route once daily.  Dispense: 18.2 mL; Refill: 6  -     azelastine (ASTELIN) 137 mcg (0.1 %) nasal spray; 1 spray (137 mcg total) by Nasal route 2 (two) times daily.  Dispense: 30 mL; Refill: 0      We will try Flonase in addition to the Astelin as I think he may have an allergic opponent to his symptoms.  Continue xyzal as well.  He may RTC as needed.

## 2021-01-04 ENCOUNTER — IMMUNIZATION (OUTPATIENT)
Dept: INTERNAL MEDICINE | Facility: CLINIC | Age: 76
End: 2021-01-04
Payer: MEDICARE

## 2021-01-04 ENCOUNTER — PATIENT MESSAGE (OUTPATIENT)
Dept: OTOLARYNGOLOGY | Facility: CLINIC | Age: 76
End: 2021-01-04

## 2021-01-04 ENCOUNTER — OFFICE VISIT (OUTPATIENT)
Dept: CARDIOLOGY | Facility: CLINIC | Age: 76
End: 2021-01-04
Payer: MEDICARE

## 2021-01-04 DIAGNOSIS — I35.0 NONRHEUMATIC AORTIC VALVE STENOSIS: Chronic | ICD-10-CM

## 2021-01-04 DIAGNOSIS — J32.4 CHRONIC PANSINUSITIS: Primary | ICD-10-CM

## 2021-01-04 DIAGNOSIS — I73.9 PVD (PERIPHERAL VASCULAR DISEASE): ICD-10-CM

## 2021-01-04 DIAGNOSIS — G47.33 OSA ON CPAP: Primary | Chronic | ICD-10-CM

## 2021-01-04 DIAGNOSIS — I25.10 CORONARY ARTERY DISEASE INVOLVING NATIVE CORONARY ARTERY OF NATIVE HEART WITHOUT ANGINA PECTORIS: Chronic | ICD-10-CM

## 2021-01-04 DIAGNOSIS — E78.2 MIXED HYPERLIPIDEMIA: ICD-10-CM

## 2021-01-04 DIAGNOSIS — Z23 NEED FOR VACCINATION: ICD-10-CM

## 2021-01-04 DIAGNOSIS — R07.9 CHEST PAIN, MODERATE CORONARY ARTERY RISK: ICD-10-CM

## 2021-01-04 DIAGNOSIS — I10 ESSENTIAL HYPERTENSION: Chronic | ICD-10-CM

## 2021-01-04 DIAGNOSIS — Z95.1 S/P CABG X 2: ICD-10-CM

## 2021-01-04 PROCEDURE — 99443 PR PHYSICIAN TELEPHONE EVALUATION 21-30 MIN: ICD-10-PCS | Mod: 95,,, | Performed by: INTERNAL MEDICINE

## 2021-01-04 PROCEDURE — 91300 COVID-19, MRNA, LNP-S, PF, 30 MCG/0.3 ML DOSE VACCINE: CPT | Mod: PBBFAC | Performed by: FAMILY MEDICINE

## 2021-01-04 PROCEDURE — 99443 PR PHYSICIAN TELEPHONE EVALUATION 21-30 MIN: CPT | Mod: 95,,, | Performed by: INTERNAL MEDICINE

## 2021-01-04 RX ORDER — MONTELUKAST SODIUM 10 MG/1
10 TABLET ORAL DAILY
Qty: 30 TABLET | Refills: 11 | Status: SHIPPED | OUTPATIENT
Start: 2021-01-04 | End: 2022-01-04 | Stop reason: SDUPTHER

## 2021-01-15 ENCOUNTER — TELEPHONE (OUTPATIENT)
Dept: INTERNAL MEDICINE | Facility: CLINIC | Age: 76
End: 2021-01-15

## 2021-01-15 DIAGNOSIS — I77.89 OTHER SPECIFIED DISORDERS OF ARTERIES AND ARTERIOLES: Primary | ICD-10-CM

## 2021-01-19 ENCOUNTER — PATIENT MESSAGE (OUTPATIENT)
Dept: OTOLARYNGOLOGY | Facility: CLINIC | Age: 76
End: 2021-01-19

## 2021-01-19 ENCOUNTER — HOSPITAL ENCOUNTER (OUTPATIENT)
Dept: RADIOLOGY | Facility: HOSPITAL | Age: 76
Discharge: HOME | End: 2021-01-19
Attending: FAMILY MEDICINE
Payer: MEDICARE

## 2021-01-19 DIAGNOSIS — J32.4 CHRONIC PANSINUSITIS: Primary | ICD-10-CM

## 2021-01-19 DIAGNOSIS — I77.89 OTHER SPECIFIED DISORDERS OF ARTERIES AND ARTERIOLES: ICD-10-CM

## 2021-01-19 PROCEDURE — 93880 EXTRACRANIAL BILAT STUDY: CPT | Mod: TC

## 2021-01-19 PROCEDURE — 93880 US CAROTID BILATERAL: ICD-10-PCS | Mod: 26,,, | Performed by: RADIOLOGY

## 2021-01-19 PROCEDURE — 93880 EXTRACRANIAL BILAT STUDY: CPT | Mod: 26,,, | Performed by: RADIOLOGY

## 2021-01-19 RX ORDER — LEVOCETIRIZINE DIHYDROCHLORIDE 5 MG/1
5 TABLET, FILM COATED ORAL NIGHTLY
Qty: 30 TABLET | Refills: 11 | Status: SHIPPED | OUTPATIENT
Start: 2021-01-19 | End: 2021-11-17

## 2021-01-21 DIAGNOSIS — I77.89 OTHER SPECIFIED DISORDERS OF ARTERIES AND ARTERIOLES: Primary | ICD-10-CM

## 2021-01-26 ENCOUNTER — IMMUNIZATION (OUTPATIENT)
Dept: INTERNAL MEDICINE | Facility: CLINIC | Age: 76
End: 2021-01-26
Payer: MEDICARE

## 2021-01-26 ENCOUNTER — OFFICE VISIT (OUTPATIENT)
Dept: INTERNAL MEDICINE | Facility: CLINIC | Age: 76
End: 2021-01-26
Payer: MEDICARE

## 2021-01-26 VITALS
TEMPERATURE: 98 F | WEIGHT: 241.63 LBS | HEART RATE: 68 BPM | DIASTOLIC BLOOD PRESSURE: 80 MMHG | HEIGHT: 68 IN | OXYGEN SATURATION: 97 % | BODY MASS INDEX: 36.62 KG/M2 | SYSTOLIC BLOOD PRESSURE: 142 MMHG

## 2021-01-26 DIAGNOSIS — Z79.4 TYPE 2 DIABETES MELLITUS WITH CHRONIC KIDNEY DISEASE, WITH LONG-TERM CURRENT USE OF INSULIN, UNSPECIFIED CKD STAGE: Primary | ICD-10-CM

## 2021-01-26 DIAGNOSIS — E03.9 ACQUIRED HYPOTHYROIDISM: ICD-10-CM

## 2021-01-26 DIAGNOSIS — Z12.5 SCREENING FOR PROSTATE CANCER: ICD-10-CM

## 2021-01-26 DIAGNOSIS — I10 ESSENTIAL HYPERTENSION: ICD-10-CM

## 2021-01-26 DIAGNOSIS — Z23 NEED FOR VACCINATION: Primary | ICD-10-CM

## 2021-01-26 DIAGNOSIS — I77.89 OTHER SPECIFIED DISORDERS OF ARTERIES AND ARTERIOLES: ICD-10-CM

## 2021-01-26 DIAGNOSIS — K74.60 CIRRHOSIS OF LIVER WITHOUT ASCITES, UNSPECIFIED HEPATIC CIRRHOSIS TYPE: ICD-10-CM

## 2021-01-26 DIAGNOSIS — E11.22 TYPE 2 DIABETES MELLITUS WITH CHRONIC KIDNEY DISEASE, WITH LONG-TERM CURRENT USE OF INSULIN, UNSPECIFIED CKD STAGE: Primary | ICD-10-CM

## 2021-01-26 PROCEDURE — 1126F PR PAIN SEVERITY QUANTIFIED, NO PAIN PRESENT: ICD-10-PCS | Mod: S$GLB,,, | Performed by: FAMILY MEDICINE

## 2021-01-26 PROCEDURE — 99214 PR OFFICE/OUTPT VISIT, EST, LEVL IV, 30-39 MIN: ICD-10-PCS | Mod: S$GLB,,, | Performed by: FAMILY MEDICINE

## 2021-01-26 PROCEDURE — 3008F PR BODY MASS INDEX (BMI) DOCUMENTED: ICD-10-PCS | Mod: CPTII,S$GLB,, | Performed by: FAMILY MEDICINE

## 2021-01-26 PROCEDURE — 1157F PR ADVANCE CARE PLAN OR EQUIV PRESENT IN MEDICAL RECORD: ICD-10-PCS | Mod: S$GLB,,, | Performed by: FAMILY MEDICINE

## 2021-01-26 PROCEDURE — 3077F PR MOST RECENT SYSTOLIC BLOOD PRESSURE >= 140 MM HG: ICD-10-PCS | Mod: CPTII,S$GLB,, | Performed by: FAMILY MEDICINE

## 2021-01-26 PROCEDURE — 1159F PR MEDICATION LIST DOCUMENTED IN MEDICAL RECORD: ICD-10-PCS | Mod: S$GLB,,, | Performed by: FAMILY MEDICINE

## 2021-01-26 PROCEDURE — 1101F PT FALLS ASSESS-DOCD LE1/YR: CPT | Mod: CPTII,S$GLB,, | Performed by: FAMILY MEDICINE

## 2021-01-26 PROCEDURE — 3044F PR MOST RECENT HEMOGLOBIN A1C LEVEL <7.0%: ICD-10-PCS | Mod: CPTII,S$GLB,, | Performed by: FAMILY MEDICINE

## 2021-01-26 PROCEDURE — 1157F ADVNC CARE PLAN IN RCRD: CPT | Mod: S$GLB,,, | Performed by: FAMILY MEDICINE

## 2021-01-26 PROCEDURE — 3077F SYST BP >= 140 MM HG: CPT | Mod: CPTII,S$GLB,, | Performed by: FAMILY MEDICINE

## 2021-01-26 PROCEDURE — 1159F MED LIST DOCD IN RCRD: CPT | Mod: S$GLB,,, | Performed by: FAMILY MEDICINE

## 2021-01-26 PROCEDURE — 0002A COVID-19, MRNA, LNP-S, PF, 30 MCG/0.3 ML DOSE VACCINE: CPT | Mod: PBBFAC | Performed by: FAMILY MEDICINE

## 2021-01-26 PROCEDURE — 1101F PR PT FALLS ASSESS DOC 0-1 FALLS W/OUT INJ PAST YR: ICD-10-PCS | Mod: CPTII,S$GLB,, | Performed by: FAMILY MEDICINE

## 2021-01-26 PROCEDURE — 99214 OFFICE O/P EST MOD 30 MIN: CPT | Mod: S$GLB,,, | Performed by: FAMILY MEDICINE

## 2021-01-26 PROCEDURE — 99999 PR PBB SHADOW E&M-EST. PATIENT-LVL V: CPT | Mod: PBBFAC,,, | Performed by: FAMILY MEDICINE

## 2021-01-26 PROCEDURE — 3079F DIAST BP 80-89 MM HG: CPT | Mod: CPTII,S$GLB,, | Performed by: FAMILY MEDICINE

## 2021-01-26 PROCEDURE — 1126F AMNT PAIN NOTED NONE PRSNT: CPT | Mod: S$GLB,,, | Performed by: FAMILY MEDICINE

## 2021-01-26 PROCEDURE — 99999 PR PBB SHADOW E&M-EST. PATIENT-LVL V: ICD-10-PCS | Mod: PBBFAC,,, | Performed by: FAMILY MEDICINE

## 2021-01-26 PROCEDURE — 3044F HG A1C LEVEL LT 7.0%: CPT | Mod: CPTII,S$GLB,, | Performed by: FAMILY MEDICINE

## 2021-01-26 PROCEDURE — 91300 COVID-19, MRNA, LNP-S, PF, 30 MCG/0.3 ML DOSE VACCINE: CPT | Mod: PBBFAC | Performed by: FAMILY MEDICINE

## 2021-01-26 PROCEDURE — 3008F BODY MASS INDEX DOCD: CPT | Mod: CPTII,S$GLB,, | Performed by: FAMILY MEDICINE

## 2021-01-26 PROCEDURE — 3288F PR FALLS RISK ASSESSMENT DOCUMENTED: ICD-10-PCS | Mod: CPTII,S$GLB,, | Performed by: FAMILY MEDICINE

## 2021-01-26 PROCEDURE — 3079F PR MOST RECENT DIASTOLIC BLOOD PRESSURE 80-89 MM HG: ICD-10-PCS | Mod: CPTII,S$GLB,, | Performed by: FAMILY MEDICINE

## 2021-01-26 PROCEDURE — 3288F FALL RISK ASSESSMENT DOCD: CPT | Mod: CPTII,S$GLB,, | Performed by: FAMILY MEDICINE

## 2021-01-26 RX ORDER — VARDENAFIL HYDROCHLORIDE 20 MG/1
TABLET ORAL
Qty: 3 TABLET | Refills: 11 | Status: SHIPPED | OUTPATIENT
Start: 2021-01-26 | End: 2021-03-18

## 2021-01-27 ENCOUNTER — TELEPHONE (OUTPATIENT)
Dept: INTERNAL MEDICINE | Facility: CLINIC | Age: 76
End: 2021-01-27

## 2021-01-27 ENCOUNTER — HOSPITAL ENCOUNTER (OUTPATIENT)
Dept: CARDIOLOGY | Facility: HOSPITAL | Age: 76
Discharge: HOME | End: 2021-01-27
Attending: INTERNAL MEDICINE
Payer: MEDICARE

## 2021-01-27 ENCOUNTER — CLINICAL SUPPORT (OUTPATIENT)
Dept: INTERNAL MEDICINE | Facility: CLINIC | Age: 76
End: 2021-01-27
Payer: MEDICARE

## 2021-01-27 ENCOUNTER — HOSPITAL ENCOUNTER (OUTPATIENT)
Dept: RADIOLOGY | Facility: HOSPITAL | Age: 76
Discharge: HOME | End: 2021-01-27
Attending: INTERNAL MEDICINE
Payer: MEDICARE

## 2021-01-27 VITALS — HEIGHT: 68 IN | WEIGHT: 241 LBS | BODY MASS INDEX: 36.53 KG/M2

## 2021-01-27 VITALS — SYSTOLIC BLOOD PRESSURE: 128 MMHG | DIASTOLIC BLOOD PRESSURE: 80 MMHG

## 2021-01-27 DIAGNOSIS — R07.9 CHEST PAIN, MODERATE CORONARY ARTERY RISK: ICD-10-CM

## 2021-01-27 DIAGNOSIS — I25.10 CORONARY ARTERY DISEASE INVOLVING NATIVE CORONARY ARTERY OF NATIVE HEART WITHOUT ANGINA PECTORIS: Chronic | ICD-10-CM

## 2021-01-27 LAB
AORTIC ROOT ANNULUS: 3.46 CM
AV INDEX (PROSTH): 0.4
AV MEAN GRADIENT: 13 MMHG
AV PEAK GRADIENT: 22 MMHG
AV VALVE AREA: 1.27 CM2
AV VELOCITY RATIO: 0.38
BSA FOR ECHO PROCEDURE: 2.29 M2
CV ECHO LV RWT: 0.56 CM
DOP CALC AO PEAK VEL: 2.32 M/S
DOP CALC AO VTI: 54.47 CM
DOP CALC LVOT AREA: 3.2 CM2
DOP CALC LVOT DIAMETER: 2.02 CM
DOP CALC LVOT PEAK VEL: 0.88 M/S
DOP CALC LVOT STROKE VOLUME: 69 CM3
DOP CALC RVOT PEAK VEL: 0.77 M/S
DOP CALC RVOT VTI: 15.77 CM
DOP CALCLVOT PEAK VEL VTI: 21.54 CM
E WAVE DECELERATION TIME: 242.64 MSEC
E/A RATIO: 1.36
E/E' RATIO: 12.56 M/S
ECHO LV POSTERIOR WALL: 1.2 CM (ref 0.6–1.1)
FRACTIONAL SHORTENING: 28 % (ref 28–44)
INTERVENTRICULAR SEPTUM: 1.46 CM (ref 0.6–1.1)
LA MAJOR: 5.54 CM
LA MINOR: 4.49 CM
LA WIDTH: 3.57 CM
LEFT ATRIUM SIZE: 3.9 CM
LEFT ATRIUM VOLUME INDEX: 26.6 ML/M2
LEFT ATRIUM VOLUME: 58.7 CM3
LEFT INTERNAL DIMENSION IN SYSTOLE: 3.07 CM (ref 2.1–4)
LEFT VENTRICLE DIASTOLIC VOLUME INDEX: 37.33 ML/M2
LEFT VENTRICLE DIASTOLIC VOLUME: 82.51 ML
LEFT VENTRICLE MASS INDEX: 97 G/M2
LEFT VENTRICLE SYSTOLIC VOLUME INDEX: 16.8 ML/M2
LEFT VENTRICLE SYSTOLIC VOLUME: 37.17 ML
LEFT VENTRICULAR INTERNAL DIMENSION IN DIASTOLE: 4.29 CM (ref 3.5–6)
LEFT VENTRICULAR MASS: 214.21 G
LV LATERAL E/E' RATIO: 11.3 M/S
LV SEPTAL E/E' RATIO: 14.13 M/S
MV PEAK A VEL: 0.83 M/S
MV PEAK E VEL: 1.13 M/S
PISA TR MAX VEL: 2.98 M/S
PV MEAN GRADIENT: 1 MMHG
PV PEAK VELOCITY: 1.33 CM/S
RA MAJOR: 5.15 CM
RA PRESSURE: 3 MMHG
RA WIDTH: 3.87 CM
RIGHT VENTRICULAR END-DIASTOLIC DIMENSION: 3.06 CM
SINUS: 2.83 CM
STJ: 2.58 CM
TDI LATERAL: 0.1 M/S
TDI SEPTAL: 0.08 M/S
TDI: 0.09 M/S
TR MAX PG: 36 MMHG
TV REST PULMONARY ARTERY PRESSURE: 39 MMHG

## 2021-01-27 PROCEDURE — 93306 TTE W/DOPPLER COMPLETE: CPT | Mod: 26,,, | Performed by: INTERNAL MEDICINE

## 2021-01-27 PROCEDURE — 93018 CV STRESS TEST I&R ONLY: CPT | Mod: ,,, | Performed by: INTERNAL MEDICINE

## 2021-01-27 PROCEDURE — A9502 TC99M TETROFOSMIN: HCPCS

## 2021-01-27 PROCEDURE — 93306 TTE W/DOPPLER COMPLETE: CPT

## 2021-01-27 PROCEDURE — 93018 STRESS TEST WITH MYOCARDIAL PERFUSION (CUPID ONLY): ICD-10-PCS | Mod: ,,, | Performed by: INTERNAL MEDICINE

## 2021-01-27 PROCEDURE — 93017 CV STRESS TEST TRACING ONLY: CPT

## 2021-01-27 PROCEDURE — 78452 STRESS TEST WITH MYOCARDIAL PERFUSION (CUPID ONLY): ICD-10-PCS | Mod: 26,,, | Performed by: INTERNAL MEDICINE

## 2021-01-27 PROCEDURE — 78452 HT MUSCLE IMAGE SPECT MULT: CPT | Mod: 26,,, | Performed by: INTERNAL MEDICINE

## 2021-01-27 PROCEDURE — 93016 STRESS TEST WITH MYOCARDIAL PERFUSION (CUPID ONLY): ICD-10-PCS | Mod: ,,, | Performed by: INTERNAL MEDICINE

## 2021-01-27 PROCEDURE — 78452 HT MUSCLE IMAGE SPECT MULT: CPT

## 2021-01-27 PROCEDURE — 93306 ECHO (CUPID ONLY): ICD-10-PCS | Mod: 26,,, | Performed by: INTERNAL MEDICINE

## 2021-01-27 PROCEDURE — 93016 CV STRESS TEST SUPVJ ONLY: CPT | Mod: ,,, | Performed by: INTERNAL MEDICINE

## 2021-01-28 ENCOUNTER — TELEPHONE (OUTPATIENT)
Dept: CARDIOLOGY | Facility: CLINIC | Age: 76
End: 2021-01-28

## 2021-01-28 LAB
CV STRESS BASE HR: 77 BPM
NUC REST EJECTION FRACTION: 69
NUC STRESS EJECTION FRACTION: 61 %
OHS CV CPX 1 MINUTE RECOVERY HEART RATE: 105 BPM
OHS CV CPX 85 PERCENT MAX PREDICTED HEART RATE MALE: 124
OHS CV CPX ESTIMATED METS: 7
OHS CV CPX MAX PREDICTED HEART RATE: 146
OHS CV CPX PATIENT IS FEMALE: 0
OHS CV CPX PATIENT IS MALE: 1
OHS CV CPX PEAK HEAR RATE: 127 BPM
OHS CV CPX PERCENT MAX PREDICTED HEART RATE ACHIEVED: 87
STRESS ECHO POST EXERCISE DUR MIN: 6 MINUTES
STRESS ECHO POST EXERCISE DUR SEC: 16 SECONDS

## 2021-01-29 ENCOUNTER — TELEPHONE (OUTPATIENT)
Dept: CARDIOLOGY | Facility: CLINIC | Age: 76
End: 2021-01-29

## 2021-02-10 ENCOUNTER — LAB VISIT (OUTPATIENT)
Dept: LAB | Facility: HOSPITAL | Age: 76
End: 2021-02-10
Attending: INTERNAL MEDICINE
Payer: MEDICARE

## 2021-02-10 DIAGNOSIS — E03.9 ACQUIRED HYPOTHYROIDISM: ICD-10-CM

## 2021-02-10 LAB
T4 FREE SERPL-MCNC: 1.13 NG/DL (ref 0.71–1.51)
TSH SERPL DL<=0.005 MIU/L-ACNC: 0.15 UIU/ML (ref 0.4–4)

## 2021-02-10 PROCEDURE — 84443 ASSAY THYROID STIM HORMONE: CPT

## 2021-02-10 PROCEDURE — 36415 COLL VENOUS BLD VENIPUNCTURE: CPT | Mod: PO

## 2021-02-10 PROCEDURE — 84439 ASSAY OF FREE THYROXINE: CPT

## 2021-02-12 ENCOUNTER — OFFICE VISIT (OUTPATIENT)
Dept: CARDIOLOGY | Facility: CLINIC | Age: 76
End: 2021-02-12
Payer: MEDICARE

## 2021-02-12 ENCOUNTER — LAB VISIT (OUTPATIENT)
Dept: LAB | Facility: HOSPITAL | Age: 76
End: 2021-02-12
Attending: INTERNAL MEDICINE
Payer: MEDICARE

## 2021-02-12 VITALS
OXYGEN SATURATION: 97 % | BODY MASS INDEX: 37.29 KG/M2 | HEIGHT: 68 IN | WEIGHT: 246.06 LBS | SYSTOLIC BLOOD PRESSURE: 132 MMHG | DIASTOLIC BLOOD PRESSURE: 74 MMHG | HEART RATE: 76 BPM

## 2021-02-12 DIAGNOSIS — I10 ESSENTIAL HYPERTENSION: Chronic | ICD-10-CM

## 2021-02-12 DIAGNOSIS — I10 ESSENTIAL HYPERTENSION: ICD-10-CM

## 2021-02-12 DIAGNOSIS — I73.9 CLAUDICATION: ICD-10-CM

## 2021-02-12 DIAGNOSIS — Z20.822 ENCOUNTER FOR LABORATORY TESTING FOR COVID-19 VIRUS: ICD-10-CM

## 2021-02-12 DIAGNOSIS — Z95.1 S/P CABG X 2: ICD-10-CM

## 2021-02-12 DIAGNOSIS — M79.609 PAIN IN EXTREMITY, UNSPECIFIED EXTREMITY: Primary | ICD-10-CM

## 2021-02-12 DIAGNOSIS — I25.10 CORONARY ARTERY DISEASE INVOLVING NATIVE CORONARY ARTERY OF NATIVE HEART WITHOUT ANGINA PECTORIS: ICD-10-CM

## 2021-02-12 DIAGNOSIS — I73.9 PVD (PERIPHERAL VASCULAR DISEASE): Primary | ICD-10-CM

## 2021-02-12 DIAGNOSIS — G47.33 OSA ON CPAP: Chronic | ICD-10-CM

## 2021-02-12 DIAGNOSIS — E78.2 MIXED HYPERLIPIDEMIA: ICD-10-CM

## 2021-02-12 DIAGNOSIS — M79.609 PAIN IN EXTREMITY, UNSPECIFIED EXTREMITY: ICD-10-CM

## 2021-02-12 LAB
ANION GAP SERPL CALC-SCNC: 6 MMOL/L (ref 8–16)
APTT BLDCRRT: 24.5 SEC (ref 21–32)
BASOPHILS # BLD AUTO: 0.03 K/UL (ref 0–0.2)
BASOPHILS NFR BLD: 0.6 % (ref 0–1.9)
BUN SERPL-MCNC: 17 MG/DL (ref 8–23)
CALCIUM SERPL-MCNC: 9.1 MG/DL (ref 8.7–10.5)
CHLORIDE SERPL-SCNC: 105 MMOL/L (ref 95–110)
CO2 SERPL-SCNC: 27 MMOL/L (ref 23–29)
CREAT SERPL-MCNC: 1 MG/DL (ref 0.5–1.4)
DIFFERENTIAL METHOD: ABNORMAL
EOSINOPHIL # BLD AUTO: 0.1 K/UL (ref 0–0.5)
EOSINOPHIL NFR BLD: 2.2 % (ref 0–8)
ERYTHROCYTE [DISTWIDTH] IN BLOOD BY AUTOMATED COUNT: 13.6 % (ref 11.5–14.5)
EST. GFR  (AFRICAN AMERICAN): >60 ML/MIN/1.73 M^2
EST. GFR  (NON AFRICAN AMERICAN): >60 ML/MIN/1.73 M^2
GLUCOSE SERPL-MCNC: 230 MG/DL (ref 70–110)
HCT VFR BLD AUTO: 41.8 % (ref 40–54)
HGB BLD-MCNC: 13.7 G/DL (ref 14–18)
IMM GRANULOCYTES # BLD AUTO: 0.02 K/UL (ref 0–0.04)
IMM GRANULOCYTES NFR BLD AUTO: 0.4 % (ref 0–0.5)
INR PPP: 1 (ref 0.8–1.2)
LYMPHOCYTES # BLD AUTO: 1.2 K/UL (ref 1–4.8)
LYMPHOCYTES NFR BLD: 23.9 % (ref 18–48)
MCH RBC QN AUTO: 30.9 PG (ref 27–31)
MCHC RBC AUTO-ENTMCNC: 32.8 G/DL (ref 32–36)
MCV RBC AUTO: 94 FL (ref 82–98)
MONOCYTES # BLD AUTO: 0.5 K/UL (ref 0.3–1)
MONOCYTES NFR BLD: 10.4 % (ref 4–15)
NEUTROPHILS # BLD AUTO: 3.2 K/UL (ref 1.8–7.7)
NEUTROPHILS NFR BLD: 62.5 % (ref 38–73)
NRBC BLD-RTO: 0 /100 WBC
PLATELET # BLD AUTO: 140 K/UL (ref 150–350)
PMV BLD AUTO: 10.9 FL (ref 9.2–12.9)
POTASSIUM SERPL-SCNC: 4.4 MMOL/L (ref 3.5–5.1)
PROTHROMBIN TIME: 10.9 SEC (ref 9–12.5)
RBC # BLD AUTO: 4.44 M/UL (ref 4.6–6.2)
SODIUM SERPL-SCNC: 138 MMOL/L (ref 136–145)
WBC # BLD AUTO: 5.11 K/UL (ref 3.9–12.7)

## 2021-02-12 PROCEDURE — 1159F PR MEDICATION LIST DOCUMENTED IN MEDICAL RECORD: ICD-10-PCS | Mod: S$GLB,,, | Performed by: INTERNAL MEDICINE

## 2021-02-12 PROCEDURE — 3288F FALL RISK ASSESSMENT DOCD: CPT | Mod: CPTII,S$GLB,, | Performed by: INTERNAL MEDICINE

## 2021-02-12 PROCEDURE — 3008F BODY MASS INDEX DOCD: CPT | Mod: CPTII,S$GLB,, | Performed by: INTERNAL MEDICINE

## 2021-02-12 PROCEDURE — 85610 PROTHROMBIN TIME: CPT

## 2021-02-12 PROCEDURE — 99215 OFFICE O/P EST HI 40 MIN: CPT | Mod: S$GLB,,, | Performed by: INTERNAL MEDICINE

## 2021-02-12 PROCEDURE — 3075F PR MOST RECENT SYSTOLIC BLOOD PRESS GE 130-139MM HG: ICD-10-PCS | Mod: CPTII,S$GLB,, | Performed by: INTERNAL MEDICINE

## 2021-02-12 PROCEDURE — 36415 COLL VENOUS BLD VENIPUNCTURE: CPT

## 2021-02-12 PROCEDURE — 3078F PR MOST RECENT DIASTOLIC BLOOD PRESSURE < 80 MM HG: ICD-10-PCS | Mod: CPTII,S$GLB,, | Performed by: INTERNAL MEDICINE

## 2021-02-12 PROCEDURE — 80048 BASIC METABOLIC PNL TOTAL CA: CPT

## 2021-02-12 PROCEDURE — 1126F PR PAIN SEVERITY QUANTIFIED, NO PAIN PRESENT: ICD-10-PCS | Mod: S$GLB,,, | Performed by: INTERNAL MEDICINE

## 2021-02-12 PROCEDURE — 1101F PT FALLS ASSESS-DOCD LE1/YR: CPT | Mod: CPTII,S$GLB,, | Performed by: INTERNAL MEDICINE

## 2021-02-12 PROCEDURE — 3075F SYST BP GE 130 - 139MM HG: CPT | Mod: CPTII,S$GLB,, | Performed by: INTERNAL MEDICINE

## 2021-02-12 PROCEDURE — 99999 PR PBB SHADOW E&M-EST. PATIENT-LVL III: ICD-10-PCS | Mod: PBBFAC,,, | Performed by: INTERNAL MEDICINE

## 2021-02-12 PROCEDURE — 1157F PR ADVANCE CARE PLAN OR EQUIV PRESENT IN MEDICAL RECORD: ICD-10-PCS | Mod: S$GLB,,, | Performed by: INTERNAL MEDICINE

## 2021-02-12 PROCEDURE — 1126F AMNT PAIN NOTED NONE PRSNT: CPT | Mod: S$GLB,,, | Performed by: INTERNAL MEDICINE

## 2021-02-12 PROCEDURE — 99215 PR OFFICE/OUTPT VISIT, EST, LEVL V, 40-54 MIN: ICD-10-PCS | Mod: S$GLB,,, | Performed by: INTERNAL MEDICINE

## 2021-02-12 PROCEDURE — 3078F DIAST BP <80 MM HG: CPT | Mod: CPTII,S$GLB,, | Performed by: INTERNAL MEDICINE

## 2021-02-12 PROCEDURE — 1101F PR PT FALLS ASSESS DOC 0-1 FALLS W/OUT INJ PAST YR: ICD-10-PCS | Mod: CPTII,S$GLB,, | Performed by: INTERNAL MEDICINE

## 2021-02-12 PROCEDURE — 3008F PR BODY MASS INDEX (BMI) DOCUMENTED: ICD-10-PCS | Mod: CPTII,S$GLB,, | Performed by: INTERNAL MEDICINE

## 2021-02-12 PROCEDURE — 1159F MED LIST DOCD IN RCRD: CPT | Mod: S$GLB,,, | Performed by: INTERNAL MEDICINE

## 2021-02-12 PROCEDURE — 99999 PR PBB SHADOW E&M-EST. PATIENT-LVL III: CPT | Mod: PBBFAC,,, | Performed by: INTERNAL MEDICINE

## 2021-02-12 PROCEDURE — 1157F ADVNC CARE PLAN IN RCRD: CPT | Mod: S$GLB,,, | Performed by: INTERNAL MEDICINE

## 2021-02-12 PROCEDURE — 85730 THROMBOPLASTIN TIME PARTIAL: CPT

## 2021-02-12 PROCEDURE — 85025 COMPLETE CBC W/AUTO DIFF WBC: CPT

## 2021-02-12 PROCEDURE — 3288F PR FALLS RISK ASSESSMENT DOCUMENTED: ICD-10-PCS | Mod: CPTII,S$GLB,, | Performed by: INTERNAL MEDICINE

## 2021-02-19 ENCOUNTER — PATIENT MESSAGE (OUTPATIENT)
Dept: INTERNAL MEDICINE | Facility: CLINIC | Age: 76
End: 2021-02-19

## 2021-02-19 ENCOUNTER — HOSPITAL ENCOUNTER (OUTPATIENT)
Dept: RADIOLOGY | Facility: HOSPITAL | Age: 76
Discharge: HOME | End: 2021-02-19
Attending: INTERNAL MEDICINE
Payer: MEDICARE

## 2021-02-19 ENCOUNTER — TELEPHONE (OUTPATIENT)
Dept: INTERNAL MEDICINE | Facility: CLINIC | Age: 76
End: 2021-02-19

## 2021-02-19 DIAGNOSIS — C22.0 HCC (HEPATOCELLULAR CARCINOMA): ICD-10-CM

## 2021-02-19 DIAGNOSIS — E03.9 ACQUIRED HYPOTHYROIDISM: Primary | ICD-10-CM

## 2021-02-19 PROCEDURE — 74183 MRI ABD W/O CNTR FLWD CNTR: CPT | Mod: 26,,, | Performed by: RADIOLOGY

## 2021-02-19 PROCEDURE — 74183 MRI ABD W/O CNTR FLWD CNTR: CPT | Mod: TC

## 2021-02-19 PROCEDURE — 25500020 PHARM REV CODE 255: Performed by: INTERNAL MEDICINE

## 2021-02-19 PROCEDURE — A9585 GADOBUTROL INJECTION: HCPCS | Performed by: INTERNAL MEDICINE

## 2021-02-19 PROCEDURE — 74183 MRI ABDOMEN W WO CONTRAST: ICD-10-PCS | Mod: 26,,, | Performed by: RADIOLOGY

## 2021-02-19 RX ORDER — LEVOTHYROXINE SODIUM 125 UG/1
250 TABLET ORAL
Qty: 60 TABLET | Refills: 11 | Status: SHIPPED | OUTPATIENT
Start: 2021-02-19 | End: 2021-07-21 | Stop reason: DRUGHIGH

## 2021-02-19 RX ORDER — GADOBUTROL 604.72 MG/ML
10 INJECTION INTRAVENOUS
Status: COMPLETED | OUTPATIENT
Start: 2021-02-19 | End: 2021-02-19

## 2021-02-19 RX ADMIN — GADOBUTROL 10 ML: 604.72 INJECTION INTRAVENOUS at 10:02

## 2021-02-21 ENCOUNTER — PATIENT MESSAGE (OUTPATIENT)
Dept: INTERNAL MEDICINE | Facility: CLINIC | Age: 76
End: 2021-02-21

## 2021-02-23 ENCOUNTER — LAB VISIT (OUTPATIENT)
Dept: OTOLARYNGOLOGY | Facility: CLINIC | Age: 76
End: 2021-02-23
Payer: MEDICARE

## 2021-02-23 DIAGNOSIS — Z20.822 ENCOUNTER FOR LABORATORY TESTING FOR COVID-19 VIRUS: ICD-10-CM

## 2021-02-23 PROCEDURE — U0003 INFECTIOUS AGENT DETECTION BY NUCLEIC ACID (DNA OR RNA); SEVERE ACUTE RESPIRATORY SYNDROME CORONAVIRUS 2 (SARS-COV-2) (CORONAVIRUS DISEASE [COVID-19]), AMPLIFIED PROBE TECHNIQUE, MAKING USE OF HIGH THROUGHPUT TECHNOLOGIES AS DESCRIBED BY CMS-2020-01-R: HCPCS

## 2021-02-23 PROCEDURE — U0005 INFEC AGEN DETEC AMPLI PROBE: HCPCS

## 2021-02-24 LAB — SARS-COV-2 RNA RESP QL NAA+PROBE: NOT DETECTED

## 2021-02-25 ENCOUNTER — TELEPHONE (OUTPATIENT)
Dept: CARDIOLOGY | Facility: CLINIC | Age: 76
End: 2021-02-25

## 2021-02-26 ENCOUNTER — TELEPHONE (OUTPATIENT)
Dept: INTERNAL MEDICINE | Facility: CLINIC | Age: 76
End: 2021-02-26

## 2021-03-01 ENCOUNTER — TELEPHONE (OUTPATIENT)
Dept: GASTROENTEROLOGY | Facility: CLINIC | Age: 76
End: 2021-03-01

## 2021-03-01 ENCOUNTER — PATIENT MESSAGE (OUTPATIENT)
Dept: HEPATOLOGY | Facility: CLINIC | Age: 76
End: 2021-03-01

## 2021-03-01 DIAGNOSIS — C22.0 HEPATOCELLULAR CARCINOMA: Primary | ICD-10-CM

## 2021-03-03 ENCOUNTER — TELEPHONE (OUTPATIENT)
Dept: HEPATOLOGY | Facility: CLINIC | Age: 76
End: 2021-03-03

## 2021-03-08 ENCOUNTER — TELEPHONE (OUTPATIENT)
Dept: HEPATOLOGY | Facility: CLINIC | Age: 76
End: 2021-03-08

## 2021-03-08 ENCOUNTER — TELEPHONE (OUTPATIENT)
Dept: GASTROENTEROLOGY | Facility: CLINIC | Age: 76
End: 2021-03-08

## 2021-03-08 DIAGNOSIS — C22.0 HEPATOCELLULAR CARCINOMA: Primary | ICD-10-CM

## 2021-03-15 ENCOUNTER — TELEPHONE (OUTPATIENT)
Dept: HEPATOLOGY | Facility: CLINIC | Age: 76
End: 2021-03-15

## 2021-03-15 DIAGNOSIS — Z91.041 CONTRAST MEDIA ALLERGY: Primary | ICD-10-CM

## 2021-03-15 RX ORDER — PREDNISONE 50 MG/1
50 TABLET ORAL
Qty: 3 TABLET | Refills: 0 | Status: SHIPPED | OUTPATIENT
Start: 2021-03-15 | End: 2021-04-13

## 2021-03-16 ENCOUNTER — TELEPHONE (OUTPATIENT)
Dept: RADIOLOGY | Facility: HOSPITAL | Age: 76
End: 2021-03-16

## 2021-03-17 ENCOUNTER — HOSPITAL ENCOUNTER (OUTPATIENT)
Dept: RADIOLOGY | Facility: HOSPITAL | Age: 76
Discharge: HOME | End: 2021-03-17
Attending: INTERNAL MEDICINE
Payer: MEDICARE

## 2021-03-17 DIAGNOSIS — C22.0 HEPATOCELLULAR CARCINOMA: ICD-10-CM

## 2021-03-17 PROCEDURE — 74160 CT ABDOMEN W/CONTRAST: CPT | Mod: 26,,, | Performed by: RADIOLOGY

## 2021-03-17 PROCEDURE — 74160 CT ABDOMEN W/CONTRAST: CPT | Mod: TC

## 2021-03-17 PROCEDURE — 25500020 PHARM REV CODE 255: Performed by: INTERNAL MEDICINE

## 2021-03-17 PROCEDURE — 74160 CT ABDOMEN WITH CONTRAST: ICD-10-PCS | Mod: 26,,, | Performed by: RADIOLOGY

## 2021-03-17 RX ADMIN — IOHEXOL 100 ML: 350 INJECTION, SOLUTION INTRAVENOUS at 11:03

## 2021-03-18 RX ORDER — SILDENAFIL 100 MG/1
TABLET, FILM COATED ORAL
Qty: 30 TABLET | Refills: 1 | Status: SHIPPED | OUTPATIENT
Start: 2021-03-18 | End: 2022-03-22 | Stop reason: SDUPTHER

## 2021-04-09 ENCOUNTER — LAB VISIT (OUTPATIENT)
Dept: LAB | Facility: HOSPITAL | Age: 76
End: 2021-04-09
Attending: FAMILY MEDICINE
Payer: MEDICARE

## 2021-04-09 DIAGNOSIS — E03.9 ACQUIRED HYPOTHYROIDISM: ICD-10-CM

## 2021-04-09 PROCEDURE — 36415 COLL VENOUS BLD VENIPUNCTURE: CPT | Mod: PO | Performed by: FAMILY MEDICINE

## 2021-04-09 PROCEDURE — 84443 ASSAY THYROID STIM HORMONE: CPT | Performed by: FAMILY MEDICINE

## 2021-04-10 LAB — TSH SERPL DL<=0.005 MIU/L-ACNC: 0.45 UIU/ML (ref 0.4–4)

## 2021-04-12 ENCOUNTER — OFFICE VISIT (OUTPATIENT)
Dept: CARDIOLOGY | Facility: CLINIC | Age: 76
End: 2021-04-12
Payer: MEDICARE

## 2021-04-12 VITALS
HEART RATE: 74 BPM | WEIGHT: 243.63 LBS | HEIGHT: 68 IN | DIASTOLIC BLOOD PRESSURE: 74 MMHG | SYSTOLIC BLOOD PRESSURE: 136 MMHG | BODY MASS INDEX: 36.92 KG/M2 | OXYGEN SATURATION: 97 %

## 2021-04-12 DIAGNOSIS — I49.3 PVC (PREMATURE VENTRICULAR CONTRACTION): Primary | ICD-10-CM

## 2021-04-12 DIAGNOSIS — I35.0 NONRHEUMATIC AORTIC VALVE STENOSIS: Chronic | ICD-10-CM

## 2021-04-12 DIAGNOSIS — G47.33 OSA ON CPAP: Chronic | ICD-10-CM

## 2021-04-12 DIAGNOSIS — I10 ESSENTIAL HYPERTENSION: Chronic | ICD-10-CM

## 2021-04-12 DIAGNOSIS — Z01.810 PREOP CARDIOVASCULAR EXAM: ICD-10-CM

## 2021-04-12 DIAGNOSIS — Z95.1 S/P CABG X 2: ICD-10-CM

## 2021-04-12 DIAGNOSIS — I25.10 CORONARY ARTERY DISEASE INVOLVING NATIVE CORONARY ARTERY OF NATIVE HEART WITHOUT ANGINA PECTORIS: Chronic | ICD-10-CM

## 2021-04-12 PROCEDURE — 99214 PR OFFICE/OUTPT VISIT, EST, LEVL IV, 30-39 MIN: ICD-10-PCS | Mod: S$GLB,,, | Performed by: INTERNAL MEDICINE

## 2021-04-12 PROCEDURE — 3008F BODY MASS INDEX DOCD: CPT | Mod: CPTII,S$GLB,, | Performed by: INTERNAL MEDICINE

## 2021-04-12 PROCEDURE — 1159F MED LIST DOCD IN RCRD: CPT | Mod: S$GLB,,, | Performed by: INTERNAL MEDICINE

## 2021-04-12 PROCEDURE — 1101F PT FALLS ASSESS-DOCD LE1/YR: CPT | Mod: CPTII,S$GLB,, | Performed by: INTERNAL MEDICINE

## 2021-04-12 PROCEDURE — 99214 OFFICE O/P EST MOD 30 MIN: CPT | Mod: S$GLB,,, | Performed by: INTERNAL MEDICINE

## 2021-04-12 PROCEDURE — 3288F PR FALLS RISK ASSESSMENT DOCUMENTED: ICD-10-PCS | Mod: CPTII,S$GLB,, | Performed by: INTERNAL MEDICINE

## 2021-04-12 PROCEDURE — 1157F PR ADVANCE CARE PLAN OR EQUIV PRESENT IN MEDICAL RECORD: ICD-10-PCS | Mod: S$GLB,,, | Performed by: INTERNAL MEDICINE

## 2021-04-12 PROCEDURE — 3288F FALL RISK ASSESSMENT DOCD: CPT | Mod: CPTII,S$GLB,, | Performed by: INTERNAL MEDICINE

## 2021-04-12 PROCEDURE — 1126F PR PAIN SEVERITY QUANTIFIED, NO PAIN PRESENT: ICD-10-PCS | Mod: S$GLB,,, | Performed by: INTERNAL MEDICINE

## 2021-04-12 PROCEDURE — 1159F PR MEDICATION LIST DOCUMENTED IN MEDICAL RECORD: ICD-10-PCS | Mod: S$GLB,,, | Performed by: INTERNAL MEDICINE

## 2021-04-12 PROCEDURE — 99999 PR PBB SHADOW E&M-EST. PATIENT-LVL III: ICD-10-PCS | Mod: PBBFAC,,, | Performed by: INTERNAL MEDICINE

## 2021-04-12 PROCEDURE — 3008F PR BODY MASS INDEX (BMI) DOCUMENTED: ICD-10-PCS | Mod: CPTII,S$GLB,, | Performed by: INTERNAL MEDICINE

## 2021-04-12 PROCEDURE — 1101F PR PT FALLS ASSESS DOC 0-1 FALLS W/OUT INJ PAST YR: ICD-10-PCS | Mod: CPTII,S$GLB,, | Performed by: INTERNAL MEDICINE

## 2021-04-12 PROCEDURE — 99999 PR PBB SHADOW E&M-EST. PATIENT-LVL III: CPT | Mod: PBBFAC,,, | Performed by: INTERNAL MEDICINE

## 2021-04-12 PROCEDURE — 1157F ADVNC CARE PLAN IN RCRD: CPT | Mod: S$GLB,,, | Performed by: INTERNAL MEDICINE

## 2021-04-12 PROCEDURE — 1126F AMNT PAIN NOTED NONE PRSNT: CPT | Mod: S$GLB,,, | Performed by: INTERNAL MEDICINE

## 2021-04-13 ENCOUNTER — TELEPHONE (OUTPATIENT)
Dept: PULMONOLOGY | Facility: CLINIC | Age: 76
End: 2021-04-13

## 2021-04-13 ENCOUNTER — HOSPITAL ENCOUNTER (OUTPATIENT)
Dept: CARDIOLOGY | Facility: HOSPITAL | Age: 76
Discharge: HOME | End: 2021-04-13
Payer: MEDICARE

## 2021-04-13 ENCOUNTER — OFFICE VISIT (OUTPATIENT)
Dept: INTERNAL MEDICINE | Facility: CLINIC | Age: 76
End: 2021-04-13
Payer: MEDICARE

## 2021-04-13 ENCOUNTER — HOSPITAL ENCOUNTER (OUTPATIENT)
Dept: RADIOLOGY | Facility: HOSPITAL | Age: 76
Discharge: HOME | End: 2021-04-13
Attending: NURSE PRACTITIONER
Payer: MEDICARE

## 2021-04-13 VITALS
BODY MASS INDEX: 37.24 KG/M2 | HEART RATE: 83 BPM | WEIGHT: 244.94 LBS | OXYGEN SATURATION: 98 % | SYSTOLIC BLOOD PRESSURE: 124 MMHG | TEMPERATURE: 98 F | DIASTOLIC BLOOD PRESSURE: 78 MMHG

## 2021-04-13 DIAGNOSIS — J45.909 ASTHMA, UNSPECIFIED ASTHMA SEVERITY, UNSPECIFIED WHETHER COMPLICATED, UNSPECIFIED WHETHER PERSISTENT: Primary | ICD-10-CM

## 2021-04-13 DIAGNOSIS — Z01.818 PRE-OPERATIVE CLEARANCE: ICD-10-CM

## 2021-04-13 DIAGNOSIS — Z01.818 PRE-OPERATIVE CLEARANCE: Primary | ICD-10-CM

## 2021-04-13 DIAGNOSIS — M79.672 LEFT FOOT PAIN: ICD-10-CM

## 2021-04-13 PROCEDURE — 71046 XR CHEST PA AND LATERAL: ICD-10-PCS | Mod: 26,,, | Performed by: RADIOLOGY

## 2021-04-13 PROCEDURE — 93010 ELECTROCARDIOGRAM REPORT: CPT | Mod: ,,, | Performed by: INTERNAL MEDICINE

## 2021-04-13 PROCEDURE — 71046 X-RAY EXAM CHEST 2 VIEWS: CPT | Mod: 26,,, | Performed by: RADIOLOGY

## 2021-04-13 PROCEDURE — 93010 EKG 12-LEAD: ICD-10-PCS | Mod: ,,, | Performed by: INTERNAL MEDICINE

## 2021-04-13 PROCEDURE — 1157F ADVNC CARE PLAN IN RCRD: CPT | Mod: S$GLB,,, | Performed by: NURSE PRACTITIONER

## 2021-04-13 PROCEDURE — 1101F PR PT FALLS ASSESS DOC 0-1 FALLS W/OUT INJ PAST YR: ICD-10-PCS | Mod: CPTII,S$GLB,, | Performed by: NURSE PRACTITIONER

## 2021-04-13 PROCEDURE — 1101F PT FALLS ASSESS-DOCD LE1/YR: CPT | Mod: CPTII,S$GLB,, | Performed by: NURSE PRACTITIONER

## 2021-04-13 PROCEDURE — 3008F PR BODY MASS INDEX (BMI) DOCUMENTED: ICD-10-PCS | Mod: CPTII,S$GLB,, | Performed by: NURSE PRACTITIONER

## 2021-04-13 PROCEDURE — 99213 PR OFFICE/OUTPT VISIT, EST, LEVL III, 20-29 MIN: ICD-10-PCS | Mod: S$GLB,,, | Performed by: NURSE PRACTITIONER

## 2021-04-13 PROCEDURE — 99213 OFFICE O/P EST LOW 20 MIN: CPT | Mod: S$GLB,,, | Performed by: NURSE PRACTITIONER

## 2021-04-13 PROCEDURE — 3288F PR FALLS RISK ASSESSMENT DOCUMENTED: ICD-10-PCS | Mod: CPTII,S$GLB,, | Performed by: NURSE PRACTITIONER

## 2021-04-13 PROCEDURE — 1159F PR MEDICATION LIST DOCUMENTED IN MEDICAL RECORD: ICD-10-PCS | Mod: S$GLB,,, | Performed by: NURSE PRACTITIONER

## 2021-04-13 PROCEDURE — 1126F PR PAIN SEVERITY QUANTIFIED, NO PAIN PRESENT: ICD-10-PCS | Mod: S$GLB,,, | Performed by: NURSE PRACTITIONER

## 2021-04-13 PROCEDURE — 99999 PR PBB SHADOW E&M-EST. PATIENT-LVL III: CPT | Mod: PBBFAC,,, | Performed by: NURSE PRACTITIONER

## 2021-04-13 PROCEDURE — 1159F MED LIST DOCD IN RCRD: CPT | Mod: S$GLB,,, | Performed by: NURSE PRACTITIONER

## 2021-04-13 PROCEDURE — 3008F BODY MASS INDEX DOCD: CPT | Mod: CPTII,S$GLB,, | Performed by: NURSE PRACTITIONER

## 2021-04-13 PROCEDURE — 99999 PR PBB SHADOW E&M-EST. PATIENT-LVL III: ICD-10-PCS | Mod: PBBFAC,,, | Performed by: NURSE PRACTITIONER

## 2021-04-13 PROCEDURE — 1157F PR ADVANCE CARE PLAN OR EQUIV PRESENT IN MEDICAL RECORD: ICD-10-PCS | Mod: S$GLB,,, | Performed by: NURSE PRACTITIONER

## 2021-04-13 PROCEDURE — 3288F FALL RISK ASSESSMENT DOCD: CPT | Mod: CPTII,S$GLB,, | Performed by: NURSE PRACTITIONER

## 2021-04-13 PROCEDURE — 1126F AMNT PAIN NOTED NONE PRSNT: CPT | Mod: S$GLB,,, | Performed by: NURSE PRACTITIONER

## 2021-04-13 PROCEDURE — 93005 ELECTROCARDIOGRAM TRACING: CPT

## 2021-04-13 PROCEDURE — 71046 X-RAY EXAM CHEST 2 VIEWS: CPT | Mod: TC

## 2021-04-23 ENCOUNTER — TELEPHONE (OUTPATIENT)
Dept: INTERNAL MEDICINE | Facility: CLINIC | Age: 76
End: 2021-04-23

## 2021-04-25 ENCOUNTER — LAB VISIT (OUTPATIENT)
Dept: URGENT CARE | Facility: CLINIC | Age: 76
End: 2021-04-25
Payer: MEDICARE

## 2021-04-25 DIAGNOSIS — J45.909 ASTHMA, UNSPECIFIED ASTHMA SEVERITY, UNSPECIFIED WHETHER COMPLICATED, UNSPECIFIED WHETHER PERSISTENT: ICD-10-CM

## 2021-04-25 PROCEDURE — U0005 INFEC AGEN DETEC AMPLI PROBE: HCPCS | Performed by: INTERNAL MEDICINE

## 2021-04-25 PROCEDURE — U0003 INFECTIOUS AGENT DETECTION BY NUCLEIC ACID (DNA OR RNA); SEVERE ACUTE RESPIRATORY SYNDROME CORONAVIRUS 2 (SARS-COV-2) (CORONAVIRUS DISEASE [COVID-19]), AMPLIFIED PROBE TECHNIQUE, MAKING USE OF HIGH THROUGHPUT TECHNOLOGIES AS DESCRIBED BY CMS-2020-01-R: HCPCS | Performed by: INTERNAL MEDICINE

## 2021-04-27 LAB — SARS-COV-2 RNA RESP QL NAA+PROBE: NOT DETECTED

## 2021-04-28 ENCOUNTER — CLINICAL SUPPORT (OUTPATIENT)
Dept: PULMONOLOGY | Facility: CLINIC | Age: 76
End: 2021-04-28
Payer: MEDICARE

## 2021-04-28 ENCOUNTER — OFFICE VISIT (OUTPATIENT)
Dept: PULMONOLOGY | Facility: CLINIC | Age: 76
End: 2021-04-28
Payer: MEDICARE

## 2021-04-28 VITALS
SYSTOLIC BLOOD PRESSURE: 120 MMHG | RESPIRATION RATE: 20 BRPM | WEIGHT: 244.69 LBS | OXYGEN SATURATION: 96 % | HEART RATE: 87 BPM | BODY MASS INDEX: 37.08 KG/M2 | DIASTOLIC BLOOD PRESSURE: 70 MMHG | HEIGHT: 68 IN

## 2021-04-28 DIAGNOSIS — E66.01 CLASS 2 SEVERE OBESITY DUE TO EXCESS CALORIES WITH SERIOUS COMORBIDITY AND BODY MASS INDEX (BMI) OF 37.0 TO 37.9 IN ADULT: ICD-10-CM

## 2021-04-28 DIAGNOSIS — G47.33 OSA ON CPAP: ICD-10-CM

## 2021-04-28 DIAGNOSIS — K21.9 GASTROESOPHAGEAL REFLUX DISEASE, UNSPECIFIED WHETHER ESOPHAGITIS PRESENT: ICD-10-CM

## 2021-04-28 DIAGNOSIS — J30.1 NON-SEASONAL ALLERGIC RHINITIS DUE TO POLLEN: ICD-10-CM

## 2021-04-28 DIAGNOSIS — J45.909 ASTHMA, UNSPECIFIED ASTHMA SEVERITY, UNSPECIFIED WHETHER COMPLICATED, UNSPECIFIED WHETHER PERSISTENT: ICD-10-CM

## 2021-04-28 DIAGNOSIS — Z77.090 ASBESTOS EXPOSURE: ICD-10-CM

## 2021-04-28 DIAGNOSIS — I70.0 AORTIC ATHEROSCLEROSIS: ICD-10-CM

## 2021-04-28 DIAGNOSIS — Z01.811 ENCOUNTER FOR PRE-OPERATIVE RESPIRATORY CLEARANCE: Primary | ICD-10-CM

## 2021-04-28 DIAGNOSIS — I10 ESSENTIAL HYPERTENSION: Chronic | ICD-10-CM

## 2021-04-28 DIAGNOSIS — J45.20 MILD INTERMITTENT ASTHMA WITHOUT COMPLICATION: ICD-10-CM

## 2021-04-28 LAB
ALLENS TEST: NORMAL
BRPFT: NORMAL
DELSYS: NORMAL
FEF 25 75 LLN: 0.87
FEF 25 75 PRE REF: 72.1 %
FEF 25 75 REF: 2.13
FEV1 FVC LLN: 62
FEV1 FVC PRE REF: 95.4 %
FEV1 FVC REF: 76
FEV1 LLN: 2.03
FEV1 PRE REF: 84.3 %
FEV1 REF: 2.86
FIO2: 0.21
FVC LLN: 2.79
FVC PRE REF: 87.8 %
FVC REF: 3.81
HCO3 UR-SCNC: 24.6 MMOL/L (ref 24–28)
MODE: NORMAL
PCO2 BLDA: 37.5 MMHG (ref 35–45)
PEF LLN: 5.33
PEF PRE REF: 125.9 %
PEF REF: 7.52
PH SMN: 7.42 [PH] (ref 7.35–7.45)
PO2 BLDA: 90 MMHG (ref 80–100)
POC BE: 0 MMOL/L
POC SATURATED O2: 97 % (ref 95–100)
PRE FEF 25 75: 1.53 L/S (ref 0.87–3.39)
PRE FET 100: 10.34 SEC
PRE FEV1 FVC: 72.18 % (ref 61.5–89.76)
PRE FEV1: 2.42 L (ref 2.03–3.7)
PRE FVC: 3.35 L (ref 2.79–4.83)
PRE PEF: 9.47 L/S (ref 5.33–9.71)
SAMPLE: NORMAL
SITE: NORMAL

## 2021-04-28 PROCEDURE — 94010 BREATHING CAPACITY TEST: ICD-10-PCS | Mod: S$GLB,,, | Performed by: INTERNAL MEDICINE

## 2021-04-28 PROCEDURE — 36600 WITHDRAWAL OF ARTERIAL BLOOD: CPT | Mod: S$GLB,,, | Performed by: INTERNAL MEDICINE

## 2021-04-28 PROCEDURE — 99999 PR PBB SHADOW E&M-EST. PATIENT-LVL V: CPT | Mod: PBBFAC,,, | Performed by: NURSE PRACTITIONER

## 2021-04-28 PROCEDURE — 1101F PR PT FALLS ASSESS DOC 0-1 FALLS W/OUT INJ PAST YR: ICD-10-PCS | Mod: CPTII,S$GLB,, | Performed by: NURSE PRACTITIONER

## 2021-04-28 PROCEDURE — 1159F PR MEDICATION LIST DOCUMENTED IN MEDICAL RECORD: ICD-10-PCS | Mod: S$GLB,,, | Performed by: NURSE PRACTITIONER

## 2021-04-28 PROCEDURE — 82803 BLOOD GASES ANY COMBINATION: CPT | Mod: S$GLB,,, | Performed by: INTERNAL MEDICINE

## 2021-04-28 PROCEDURE — 3288F FALL RISK ASSESSMENT DOCD: CPT | Mod: CPTII,S$GLB,, | Performed by: NURSE PRACTITIONER

## 2021-04-28 PROCEDURE — 36600 PR WITHDRAWAL OF ARTERIAL BLOOD: ICD-10-PCS | Mod: S$GLB,,, | Performed by: INTERNAL MEDICINE

## 2021-04-28 PROCEDURE — 99214 PR OFFICE/OUTPT VISIT, EST, LEVL IV, 30-39 MIN: ICD-10-PCS | Mod: 25,S$GLB,, | Performed by: NURSE PRACTITIONER

## 2021-04-28 PROCEDURE — 3008F BODY MASS INDEX DOCD: CPT | Mod: CPTII,S$GLB,, | Performed by: NURSE PRACTITIONER

## 2021-04-28 PROCEDURE — 1101F PT FALLS ASSESS-DOCD LE1/YR: CPT | Mod: CPTII,S$GLB,, | Performed by: NURSE PRACTITIONER

## 2021-04-28 PROCEDURE — 1159F MED LIST DOCD IN RCRD: CPT | Mod: S$GLB,,, | Performed by: NURSE PRACTITIONER

## 2021-04-28 PROCEDURE — 1157F PR ADVANCE CARE PLAN OR EQUIV PRESENT IN MEDICAL RECORD: ICD-10-PCS | Mod: S$GLB,,, | Performed by: NURSE PRACTITIONER

## 2021-04-28 PROCEDURE — 99214 OFFICE O/P EST MOD 30 MIN: CPT | Mod: 25,S$GLB,, | Performed by: NURSE PRACTITIONER

## 2021-04-28 PROCEDURE — 82803 PR  BLOOD GASES: PH, PO2 & PCO2: ICD-10-PCS | Mod: S$GLB,,, | Performed by: INTERNAL MEDICINE

## 2021-04-28 PROCEDURE — 1157F ADVNC CARE PLAN IN RCRD: CPT | Mod: S$GLB,,, | Performed by: NURSE PRACTITIONER

## 2021-04-28 PROCEDURE — 99999 PR PBB SHADOW E&M-EST. PATIENT-LVL V: ICD-10-PCS | Mod: PBBFAC,,, | Performed by: NURSE PRACTITIONER

## 2021-04-28 PROCEDURE — 94010 BREATHING CAPACITY TEST: CPT | Mod: S$GLB,,, | Performed by: INTERNAL MEDICINE

## 2021-04-28 PROCEDURE — 3008F PR BODY MASS INDEX (BMI) DOCUMENTED: ICD-10-PCS | Mod: CPTII,S$GLB,, | Performed by: NURSE PRACTITIONER

## 2021-04-28 PROCEDURE — 3288F PR FALLS RISK ASSESSMENT DOCUMENTED: ICD-10-PCS | Mod: CPTII,S$GLB,, | Performed by: NURSE PRACTITIONER

## 2021-05-06 ENCOUNTER — TELEPHONE (OUTPATIENT)
Dept: UROLOGY | Facility: CLINIC | Age: 76
End: 2021-05-06

## 2021-05-06 ENCOUNTER — PATIENT MESSAGE (OUTPATIENT)
Dept: UROLOGY | Facility: CLINIC | Age: 76
End: 2021-05-06

## 2021-05-11 ENCOUNTER — TELEPHONE (OUTPATIENT)
Dept: INTERNAL MEDICINE | Facility: CLINIC | Age: 76
End: 2021-05-11

## 2021-07-01 ENCOUNTER — PATIENT MESSAGE (OUTPATIENT)
Dept: ADMINISTRATIVE | Facility: OTHER | Age: 76
End: 2021-07-01

## 2021-07-13 ENCOUNTER — TELEPHONE (OUTPATIENT)
Dept: RADIOLOGY | Facility: HOSPITAL | Age: 76
End: 2021-07-13

## 2021-07-14 ENCOUNTER — HOSPITAL ENCOUNTER (OUTPATIENT)
Dept: RADIOLOGY | Facility: HOSPITAL | Age: 76
Discharge: HOME | End: 2021-07-14
Attending: FAMILY MEDICINE
Payer: MEDICARE

## 2021-07-14 DIAGNOSIS — I77.89 OTHER SPECIFIED DISORDERS OF ARTERIES AND ARTERIOLES: ICD-10-CM

## 2021-07-14 PROCEDURE — 93880 EXTRACRANIAL BILAT STUDY: CPT | Mod: TC

## 2021-07-14 PROCEDURE — 93880 US CAROTID BILATERAL: ICD-10-PCS | Mod: 26,,, | Performed by: RADIOLOGY

## 2021-07-14 PROCEDURE — 93880 EXTRACRANIAL BILAT STUDY: CPT | Mod: 26,,, | Performed by: RADIOLOGY

## 2021-07-16 ENCOUNTER — TELEPHONE (OUTPATIENT)
Dept: INTERNAL MEDICINE | Facility: CLINIC | Age: 76
End: 2021-07-16

## 2021-07-16 DIAGNOSIS — I77.89 OTHER SPECIFIED DISORDERS OF ARTERIES AND ARTERIOLES: Primary | ICD-10-CM

## 2021-07-21 ENCOUNTER — OFFICE VISIT (OUTPATIENT)
Dept: INTERNAL MEDICINE | Facility: CLINIC | Age: 76
End: 2021-07-21
Payer: MEDICARE

## 2021-07-21 VITALS
SYSTOLIC BLOOD PRESSURE: 126 MMHG | HEART RATE: 73 BPM | HEIGHT: 68 IN | DIASTOLIC BLOOD PRESSURE: 80 MMHG | BODY MASS INDEX: 37.05 KG/M2 | TEMPERATURE: 98 F | WEIGHT: 244.5 LBS | OXYGEN SATURATION: 96 %

## 2021-07-21 DIAGNOSIS — I10 ESSENTIAL HYPERTENSION: ICD-10-CM

## 2021-07-21 DIAGNOSIS — I73.9 CLAUDICATION: ICD-10-CM

## 2021-07-21 DIAGNOSIS — E05.80 IATROGENIC HYPERTHYROIDISM: Primary | ICD-10-CM

## 2021-07-21 DIAGNOSIS — I25.10 CORONARY ARTERY DISEASE INVOLVING NATIVE CORONARY ARTERY OF NATIVE HEART WITHOUT ANGINA PECTORIS: Chronic | ICD-10-CM

## 2021-07-21 DIAGNOSIS — I85.10 SECONDARY ESOPHAGEAL VARICES WITHOUT BLEEDING: ICD-10-CM

## 2021-07-21 DIAGNOSIS — N40.1 BENIGN PROSTATIC HYPERPLASIA WITH NOCTURIA: ICD-10-CM

## 2021-07-21 DIAGNOSIS — R80.9 TYPE 2 DIABETES MELLITUS WITH MICROALBUMINURIA, WITH LONG-TERM CURRENT USE OF INSULIN: Chronic | ICD-10-CM

## 2021-07-21 DIAGNOSIS — K21.9 GASTROESOPHAGEAL REFLUX DISEASE WITHOUT ESOPHAGITIS: ICD-10-CM

## 2021-07-21 DIAGNOSIS — R35.1 BENIGN PROSTATIC HYPERPLASIA WITH NOCTURIA: ICD-10-CM

## 2021-07-21 DIAGNOSIS — J42 CHRONIC WHEEZY BRONCHITIS: ICD-10-CM

## 2021-07-21 DIAGNOSIS — Z78.9 STATIN INTOLERANCE: Chronic | ICD-10-CM

## 2021-07-21 DIAGNOSIS — I48.0 PAROXYSMAL ATRIAL FIBRILLATION: ICD-10-CM

## 2021-07-21 DIAGNOSIS — K74.60 CIRRHOSIS OF LIVER WITHOUT ASCITES, UNSPECIFIED HEPATIC CIRRHOSIS TYPE: ICD-10-CM

## 2021-07-21 DIAGNOSIS — E03.9 ACQUIRED HYPOTHYROIDISM: Chronic | ICD-10-CM

## 2021-07-21 DIAGNOSIS — I65.23 BILATERAL CAROTID ARTERY STENOSIS: Chronic | ICD-10-CM

## 2021-07-21 DIAGNOSIS — C22.0 HCC (HEPATOCELLULAR CARCINOMA): ICD-10-CM

## 2021-07-21 DIAGNOSIS — Z79.4 TYPE 2 DIABETES MELLITUS WITH MICROALBUMINURIA, WITH LONG-TERM CURRENT USE OF INSULIN: Chronic | ICD-10-CM

## 2021-07-21 DIAGNOSIS — E11.29 TYPE 2 DIABETES MELLITUS WITH MICROALBUMINURIA, WITH LONG-TERM CURRENT USE OF INSULIN: Chronic | ICD-10-CM

## 2021-07-21 PROCEDURE — 3074F SYST BP LT 130 MM HG: CPT | Mod: CPTII,S$GLB,, | Performed by: FAMILY MEDICINE

## 2021-07-21 PROCEDURE — 3051F PR MOST RECENT HEMOGLOBIN A1C LEVEL 7.0 - < 8.0%: ICD-10-PCS | Mod: CPTII,S$GLB,, | Performed by: FAMILY MEDICINE

## 2021-07-21 PROCEDURE — 3288F FALL RISK ASSESSMENT DOCD: CPT | Mod: CPTII,S$GLB,, | Performed by: FAMILY MEDICINE

## 2021-07-21 PROCEDURE — 1157F PR ADVANCE CARE PLAN OR EQUIV PRESENT IN MEDICAL RECORD: ICD-10-PCS | Mod: CPTII,S$GLB,, | Performed by: FAMILY MEDICINE

## 2021-07-21 PROCEDURE — 99999 PR PBB SHADOW E&M-EST. PATIENT-LVL V: CPT | Mod: PBBFAC,,, | Performed by: FAMILY MEDICINE

## 2021-07-21 PROCEDURE — 1101F PT FALLS ASSESS-DOCD LE1/YR: CPT | Mod: CPTII,S$GLB,, | Performed by: FAMILY MEDICINE

## 2021-07-21 PROCEDURE — 3079F DIAST BP 80-89 MM HG: CPT | Mod: CPTII,S$GLB,, | Performed by: FAMILY MEDICINE

## 2021-07-21 PROCEDURE — 3074F PR MOST RECENT SYSTOLIC BLOOD PRESSURE < 130 MM HG: ICD-10-PCS | Mod: CPTII,S$GLB,, | Performed by: FAMILY MEDICINE

## 2021-07-21 PROCEDURE — 99214 PR OFFICE/OUTPT VISIT, EST, LEVL IV, 30-39 MIN: ICD-10-PCS | Mod: S$GLB,,, | Performed by: FAMILY MEDICINE

## 2021-07-21 PROCEDURE — 1157F ADVNC CARE PLAN IN RCRD: CPT | Mod: CPTII,S$GLB,, | Performed by: FAMILY MEDICINE

## 2021-07-21 PROCEDURE — 1159F MED LIST DOCD IN RCRD: CPT | Mod: CPTII,S$GLB,, | Performed by: FAMILY MEDICINE

## 2021-07-21 PROCEDURE — 1126F AMNT PAIN NOTED NONE PRSNT: CPT | Mod: CPTII,S$GLB,, | Performed by: FAMILY MEDICINE

## 2021-07-21 PROCEDURE — 99214 OFFICE O/P EST MOD 30 MIN: CPT | Mod: S$GLB,,, | Performed by: FAMILY MEDICINE

## 2021-07-21 PROCEDURE — 1126F PR PAIN SEVERITY QUANTIFIED, NO PAIN PRESENT: ICD-10-PCS | Mod: CPTII,S$GLB,, | Performed by: FAMILY MEDICINE

## 2021-07-21 PROCEDURE — 3079F PR MOST RECENT DIASTOLIC BLOOD PRESSURE 80-89 MM HG: ICD-10-PCS | Mod: CPTII,S$GLB,, | Performed by: FAMILY MEDICINE

## 2021-07-21 PROCEDURE — 3051F HG A1C>EQUAL 7.0%<8.0%: CPT | Mod: CPTII,S$GLB,, | Performed by: FAMILY MEDICINE

## 2021-07-21 PROCEDURE — 1159F PR MEDICATION LIST DOCUMENTED IN MEDICAL RECORD: ICD-10-PCS | Mod: CPTII,S$GLB,, | Performed by: FAMILY MEDICINE

## 2021-07-21 PROCEDURE — 99999 PR PBB SHADOW E&M-EST. PATIENT-LVL V: ICD-10-PCS | Mod: PBBFAC,,, | Performed by: FAMILY MEDICINE

## 2021-07-21 PROCEDURE — 3288F PR FALLS RISK ASSESSMENT DOCUMENTED: ICD-10-PCS | Mod: CPTII,S$GLB,, | Performed by: FAMILY MEDICINE

## 2021-07-21 PROCEDURE — 1101F PR PT FALLS ASSESS DOC 0-1 FALLS W/OUT INJ PAST YR: ICD-10-PCS | Mod: CPTII,S$GLB,, | Performed by: FAMILY MEDICINE

## 2021-07-21 RX ORDER — ASPIRIN 81 MG/1
81 TABLET ORAL DAILY
Qty: 90 TABLET | Refills: 4 | Status: SHIPPED | OUTPATIENT
Start: 2021-07-21 | End: 2022-10-13

## 2021-07-21 RX ORDER — METFORMIN HYDROCHLORIDE 500 MG/1
500 TABLET ORAL 2 TIMES DAILY WITH MEALS
Qty: 180 TABLET | Refills: 0 | Status: SHIPPED | OUTPATIENT
Start: 2021-07-21 | End: 2021-10-21

## 2021-07-21 RX ORDER — PEN NEEDLE, DIABETIC 30 GX3/16"
NEEDLE, DISPOSABLE MISCELLANEOUS
Qty: 100 EACH | Refills: 11 | Status: SHIPPED | OUTPATIENT
Start: 2021-07-21 | End: 2024-01-09 | Stop reason: SDUPTHER

## 2021-07-21 RX ORDER — FINASTERIDE 5 MG/1
5 TABLET, FILM COATED ORAL DAILY
Qty: 30 TABLET | Refills: 11 | Status: SHIPPED | OUTPATIENT
Start: 2021-07-21 | End: 2022-05-06

## 2021-07-21 RX ORDER — INSULIN GLARGINE 100 [IU]/ML
INJECTION, SOLUTION SUBCUTANEOUS
Qty: 15 ML | Refills: 6 | Status: SHIPPED | OUTPATIENT
Start: 2021-07-21 | End: 2022-03-29

## 2021-07-21 RX ORDER — METOPROLOL SUCCINATE 50 MG/1
50 TABLET, EXTENDED RELEASE ORAL 2 TIMES DAILY
Qty: 60 TABLET | Refills: 11 | Status: SHIPPED | OUTPATIENT
Start: 2021-07-21 | End: 2021-12-09 | Stop reason: ALTCHOICE

## 2021-07-21 RX ORDER — RABEPRAZOLE SODIUM 20 MG/1
20 TABLET, DELAYED RELEASE ORAL 2 TIMES DAILY
Qty: 60 TABLET | Refills: 11 | Status: SHIPPED | OUTPATIENT
Start: 2021-07-21 | End: 2022-08-16 | Stop reason: SDUPTHER

## 2021-07-21 RX ORDER — LEVOTHYROXINE SODIUM 112 UG/1
224 TABLET ORAL
Qty: 60 TABLET | Refills: 2 | Status: SHIPPED | OUTPATIENT
Start: 2021-07-21 | End: 2021-09-17

## 2021-08-11 ENCOUNTER — PATIENT MESSAGE (OUTPATIENT)
Dept: CARDIOLOGY | Facility: CLINIC | Age: 76
End: 2021-08-11

## 2021-08-16 ENCOUNTER — HOSPITAL ENCOUNTER (OUTPATIENT)
Dept: RADIOLOGY | Facility: HOSPITAL | Age: 76
Discharge: HOME | End: 2021-08-16
Attending: PEDIATRICS
Payer: MEDICARE

## 2021-08-16 ENCOUNTER — OFFICE VISIT (OUTPATIENT)
Dept: INTERNAL MEDICINE | Facility: CLINIC | Age: 76
End: 2021-08-16
Payer: MEDICARE

## 2021-08-16 VITALS
SYSTOLIC BLOOD PRESSURE: 138 MMHG | BODY MASS INDEX: 37.25 KG/M2 | TEMPERATURE: 98 F | OXYGEN SATURATION: 98 % | HEIGHT: 68 IN | HEART RATE: 82 BPM | DIASTOLIC BLOOD PRESSURE: 80 MMHG | WEIGHT: 245.81 LBS

## 2021-08-16 DIAGNOSIS — E11.29 TYPE 2 DIABETES MELLITUS WITH MICROALBUMINURIA, WITH LONG-TERM CURRENT USE OF INSULIN: Chronic | ICD-10-CM

## 2021-08-16 DIAGNOSIS — R80.9 TYPE 2 DIABETES MELLITUS WITH MICROALBUMINURIA, WITH LONG-TERM CURRENT USE OF INSULIN: Chronic | ICD-10-CM

## 2021-08-16 DIAGNOSIS — Z79.4 TYPE 2 DIABETES MELLITUS WITH MICROALBUMINURIA, WITH LONG-TERM CURRENT USE OF INSULIN: Chronic | ICD-10-CM

## 2021-08-16 DIAGNOSIS — R10.9 ABDOMINAL PAIN, UNSPECIFIED ABDOMINAL LOCATION: Primary | ICD-10-CM

## 2021-08-16 DIAGNOSIS — R10.9 ABDOMINAL PAIN, UNSPECIFIED ABDOMINAL LOCATION: ICD-10-CM

## 2021-08-16 PROCEDURE — 74019 RADEX ABDOMEN 2 VIEWS: CPT | Mod: 26,,, | Performed by: RADIOLOGY

## 2021-08-16 PROCEDURE — 99214 PR OFFICE/OUTPT VISIT, EST, LEVL IV, 30-39 MIN: ICD-10-PCS | Mod: S$GLB,,, | Performed by: PEDIATRICS

## 2021-08-16 PROCEDURE — 1159F MED LIST DOCD IN RCRD: CPT | Mod: CPTII,S$GLB,, | Performed by: PEDIATRICS

## 2021-08-16 PROCEDURE — 1160F PR REVIEW ALL MEDS BY PRESCRIBER/CLIN PHARMACIST DOCUMENTED: ICD-10-PCS | Mod: CPTII,S$GLB,, | Performed by: PEDIATRICS

## 2021-08-16 PROCEDURE — 99999 PR PBB SHADOW E&M-EST. PATIENT-LVL IV: CPT | Mod: PBBFAC,,, | Performed by: PEDIATRICS

## 2021-08-16 PROCEDURE — 1157F ADVNC CARE PLAN IN RCRD: CPT | Mod: CPTII,S$GLB,, | Performed by: PEDIATRICS

## 2021-08-16 PROCEDURE — 1160F RVW MEDS BY RX/DR IN RCRD: CPT | Mod: CPTII,S$GLB,, | Performed by: PEDIATRICS

## 2021-08-16 PROCEDURE — 74019 XR ABDOMEN FLAT AND ERECT: ICD-10-PCS | Mod: 26,,, | Performed by: RADIOLOGY

## 2021-08-16 PROCEDURE — 74019 RADEX ABDOMEN 2 VIEWS: CPT | Mod: TC

## 2021-08-16 PROCEDURE — 3075F SYST BP GE 130 - 139MM HG: CPT | Mod: CPTII,S$GLB,, | Performed by: PEDIATRICS

## 2021-08-16 PROCEDURE — 99999 PR PBB SHADOW E&M-EST. PATIENT-LVL IV: ICD-10-PCS | Mod: PBBFAC,,, | Performed by: PEDIATRICS

## 2021-08-16 PROCEDURE — 3079F DIAST BP 80-89 MM HG: CPT | Mod: CPTII,S$GLB,, | Performed by: PEDIATRICS

## 2021-08-16 PROCEDURE — 3051F PR MOST RECENT HEMOGLOBIN A1C LEVEL 7.0 - < 8.0%: ICD-10-PCS | Mod: CPTII,S$GLB,, | Performed by: PEDIATRICS

## 2021-08-16 PROCEDURE — 99214 OFFICE O/P EST MOD 30 MIN: CPT | Mod: S$GLB,,, | Performed by: PEDIATRICS

## 2021-08-16 PROCEDURE — 1157F PR ADVANCE CARE PLAN OR EQUIV PRESENT IN MEDICAL RECORD: ICD-10-PCS | Mod: CPTII,S$GLB,, | Performed by: PEDIATRICS

## 2021-08-16 PROCEDURE — 1125F PR PAIN SEVERITY QUANTIFIED, PAIN PRESENT: ICD-10-PCS | Mod: CPTII,S$GLB,, | Performed by: PEDIATRICS

## 2021-08-16 PROCEDURE — 3079F PR MOST RECENT DIASTOLIC BLOOD PRESSURE 80-89 MM HG: ICD-10-PCS | Mod: CPTII,S$GLB,, | Performed by: PEDIATRICS

## 2021-08-16 PROCEDURE — 1159F PR MEDICATION LIST DOCUMENTED IN MEDICAL RECORD: ICD-10-PCS | Mod: CPTII,S$GLB,, | Performed by: PEDIATRICS

## 2021-08-16 PROCEDURE — 3075F PR MOST RECENT SYSTOLIC BLOOD PRESS GE 130-139MM HG: ICD-10-PCS | Mod: CPTII,S$GLB,, | Performed by: PEDIATRICS

## 2021-08-16 PROCEDURE — 1125F AMNT PAIN NOTED PAIN PRSNT: CPT | Mod: CPTII,S$GLB,, | Performed by: PEDIATRICS

## 2021-08-16 PROCEDURE — 3051F HG A1C>EQUAL 7.0%<8.0%: CPT | Mod: CPTII,S$GLB,, | Performed by: PEDIATRICS

## 2021-08-16 NOTE — PROGRESS NOTES
Assessment and Plan:     Problem List Items Addressed This Visit        Respiratory    Emphysema lung (HCC)    Relevant Medications    predniSONE 20 mg tablet    budesonide-formoterol (Symbicort) 160-4 5 mcg/act inhaler       Cardiovascular and Mediastinum    Essential hypertension    Relevant Medications    lisinopril (ZESTRIL) 40 mg tablet    eplerenone (INSPRA) 50 MG tablet    amLODIPine (NORVASC) 5 mg tablet       Other    Familial hypercholesterolemia    Relevant Medications    rosuvastatin (CRESTOR) 20 MG tablet      Other Visit Diagnoses     Medicare annual wellness visit, subsequent    -  Primary    Elevated blood uric acid level        Relevant Medications    allopurinol (ZYLOPRIM) 100 mg tablet    Chronic obstructive pulmonary disease, unspecified COPD type (HCC)        Relevant Medications    predniSONE 20 mg tablet    eplerenone (INSPRA) 50 MG tablet    budesonide-formoterol (Symbicort) 160-4 5 mcg/act inhaler        BMI Counseling: Body mass index is 32 01 kg/m²  The BMI is above normal  Nutrition recommendations include decreasing portion sizes  Exercise recommendations include moderate physical activity 150 minutes/week  No pharmacotherapy was ordered  Preventive health issues were discussed with patient, and age appropriate screening tests were ordered as noted in patient's After Visit Summary  Personalized health advice and appropriate referrals for health education or preventive services given if needed, as noted in patient's After Visit Summary       History of Present Illness:     Patient presents for Medicare Annual Wellness visit    Patient Care Team:  Deacon Ambriz MD as PCP - General  Radha Orellana MD as PCP - [address] [place] [address] (RTE)     Problem List:     Patient Active Problem List   Diagnosis    Essential hypertension    Familial hypercholesterolemia    Emphysema lung ([address] )    Chronic gout without tophus      Past Medical and Surgical History:     Past Medical History: Subjective:   Patient: Erendira Pretty [id number], :1945   Visit date:2019 9:04 AM    Chief Complaint: Status post sinus surgery    HPI:  Erendira is a 74 y.o. male with Chronic sinusitis.    Subjective:  3 weeks of foul drainage.     3-4 weeks of vertigo.  Lying down, standing.  No change in hearing.  No ear pain. Spells last seconds.     medical regimen: nebulized budesonide and azelastine.  PO xyzal and singulair    Surgery: 3/27/19    Sinuses operated/OR findings:   Full house    Diffuse polyps.  No purulence    Final path:   FINAL PATHOLOGIC DIAGNOSIS  Nasal contents:  Chronic sinusitis with focally increased eosinophils (up to 30 per high-power field).  Fragments of benign bone and cartilage.  OM[facility]  Diagnosed by: Jh Moreno M.D.  (Electronically Signed: 2019 09:29:10)    OR Culture: na          Review of Systems:  -     Allergic/Immunologic: is allergic to lipitor [atorvastatin]; niacin preparations; iodinated contrast- oral and iv dye; omeprazole; and prilosec [omeprazole magnesium]..  -     Constitutional: Current temp:      His meds, allergies, medical, surgical, social & family histories were reviewed & updated:  -     He has a current medication list which includes the following prescription(s): accu-chek lux plus meter, accu-chek lux plus test strp, albuterol-ipratropium, aspirin, azelastine, budesonide-formoterol 160-4.5 mcg, docusate sodium, finasteride, furosemide, glucosamine/chondr leach a sod, humalog kwikpen insulin, insulin, krill oil, lancets, levocetirizine, metoprolol succinate, milk thistle, montelukast, multivit,iron,mins/folic acid, pen needle, diabetic, polyethylene glycol, rabeprazole, sildenafil, sildenafil, and synthroid, and the following Facility-Administered Medications: lactated ringers.  -     He  has a past medical history of Aortic stenosis, Asthma, BPH (benign prostatic hyperplasia), CAD (coronary artery disease), Cirrhosis, Claudication (2014), Colon polyp,  COPD (chronic obstructive pulmonary disease), ED (erectile dysfunction), Encounter for blood transfusion, Ex-smoker, Hearing loss NEC, Hepatitis C, Hyperlipidemia, Hypertension, Hypothyroid, DELONTE on CPAP, Osteoarthritis, PVD (peripheral vascular disease), and Type 2 diabetes mellitus.   -     He does not have any pertinent problems on file.   -     He  has a past surgical history that includes Knee surgery (Right); Trigger finger release (Left); Carpal tunnel release (Left); Elbow bursa surgery (Right, 2010); Rectal surgery (2012); Eye surgery; Cataract extraction w/ intraocular lens  implant, bilateral (2008); Cardiac catheterization; Colonoscopy (8/2013); Colonoscopy (N/A, 5/30/2016); Transurethral resection of prostate (TURP) without use of laser (N/A, 6/19/2018); Coronary Artery Bypass Graft (CABG) (N/A, 11/5/2018); Endoscopic harvest of vein (Left, 11/5/2018); Cardiac surgery; Catheterization of both left and right heart (N/A, 11/2/2018); Functional endoscopic sinus surgery (FESS) (Bilateral, 3/27/2019); Frontal sinus obliteration (Bilateral, 3/27/2019); Maxillary antrostomy (Bilateral, 3/27/2019); Ethmoidectomy (Bilateral, 3/27/2019); Nasal septoplasty (N/A, 3/27/2019); Knee arthroscopy w/ meniscal repair (Left, 06/2019); Esophagogastroduodenoscopy (N/A, 7/17/2019); and Colonoscopy (N/A, 7/17/2019).  -     He  reports that he quit smoking about 47 years ago. He has a 2.00 pack-year smoking history. He quit smokeless tobacco use about 42 years ago. His smokeless tobacco use included chew. He reports that he drinks about 1.2 oz of alcohol per week. He reports that he does not use drugs.  -     His family history includes Heart disease in his brother, father, and mother.  -     He is allergic to lipitor [atorvastatin]; niacin preparations; iodinated contrast- oral and iv dye; omeprazole; and prilosec [omeprazole magnesium].    Objective:   Physical Exam:  Vitals:  There were no vitals taken for this  Diagnosis Date    Allergic 2018    Arthritis 2000    COPD (chronic obstructive pulmonary disease) ([address] ) 2018    Emphysema lung (HCC)     GERD (gastroesophageal reflux disease) 2016    Hypertension     Shingles 2000    Suspicious nevus     LAST ASSESSED [id number]     Past Surgical History:   Procedure Laterality Date    HERNIA REPAIR  2005    KNEE SURGERY        Family History:     Family History   Problem Relation Age of Onset    Hyperlipidemia Mother    Mollrachael Sizer Arthritis Mother     Colon polyps Father     Alcohol abuse Father     Arthritis Sister     Cancer Paternal Uncle     Substance Abuse Neg Hx     Mental illness Neg Hx       Social History:     Social History     Socioeconomic History    Marital status: /Civil Union     Spouse name: None    Number of children: None    Years of education: None    Highest education level: None   Occupational History    None   Tobacco Use    Smoking status: Former Smoker     Packs/day: 1 00     Years: 44 00     Pack years: 44 00     Types: Cigarettes     Start date: 6/9/1967     Quit date: 5/15/2011     Years since quitting: 10 2    Smokeless tobacco: Never Used    Tobacco comment: entire family smoked   Substance and Sexual Activity    Alcohol use: Yes     Alcohol/week: 8 0 standard drinks     Types: 8 Standard drinks or equivalent per week     Comment: social    Drug use: No    Sexual activity: Not Currently     Partners: Female     Birth control/protection: Female Sterilization   Other Topics Concern    None   Social History Narrative    None     Social Determinants of Health     Financial Resource Strain:     Difficulty of Paying Living Expenses:    Food Insecurity:     Worried About Running Out of Food in the Last Year:     Ran Out of Food in the Last Year:    Transportation Needs:     Lack of Transportation (Medical):      Lack of Transportation (Non-Medical):    Physical Activity:     Days of Exercise per Week:     Minutes of Exercise per Session:    Stress:     Feeling of Stress :    Social Connections:     Frequency of Communication with Friends and Family:     Frequency of Social Gatherings with Friends and Family:     Attends Scientologist Services:     Active Member of Clubs or Organizations:     Attends Club or Organization Meetings:     Marital Status:    Intimate Partner Violence:     Fear of Current or Ex-Partner:     Emotionally Abused:     Physically Abused:     Sexually Abused:       Medications and Allergies:     Current Outpatient Medications   Medication Sig Dispense Refill    albuterol (ProAir HFA) 90 mcg/act inhaler Inhale 2 puffs 1 a day 8 5 Inhaler 1    allopurinol (ZYLOPRIM) 100 mg tablet Take 1 tablet (100 mg total) by mouth 2 (two) times a day 180 tablet 3    amLODIPine (NORVASC) 5 mg tablet Take 1 tablet (5 mg total) by mouth daily 90 tablet 3    aspirin (ECOTRIN LOW STRENGTH) 81 mg EC tablet Take 81 mg by mouth daily      budesonide-formoterol (Symbicort) 160-4 5 mcg/act inhaler Inhale 2 puffs 2 (two) times a day Rinse mouth after use  30 6 g 3    eplerenone (INSPRA) 50 MG tablet Take 1 tablet (50 mg total) by mouth daily 90 tablet 3    furosemide (LASIX) 20 mg tablet TAKE 1 TABLET DAILY 90 tablet 1    lisinopril (ZESTRIL) 40 mg tablet Take 1 tablet (40 mg total) by mouth daily 90 tablet 3    omeprazole (PriLOSEC) 20 mg delayed release capsule Take 20 mg by mouth daily      rosuvastatin (CRESTOR) 20 MG tablet Take 1 tablet (20 mg total) by mouth daily 90 tablet 3    triamcinolone (KENALOG) 0 5 % cream Apply topically 3 (three) times a day      predniSONE 20 mg tablet TAKE 1 TABLET BID X 5 DAYS THEN TAKE 1 TABLET QD FOR 5 DAYS 15 tablet 1     No current facility-administered medications for this visit       Allergies   Allergen Reactions    Wheat Bran - Food Allergy       Immunizations:     Immunization History   Administered Date(s) Administered    INFLUENZA 09/30/2015, 09/30/2016    Influenza Split visit.  General appearance:  Well developed, well nourished    Eyes:  Extraocular motions intact, PERRL    Communication:  no hoarseness, no dysphonia    Ears:  Otoscopy of external auditory canals and tympanic membranes was normal, clinical speech reception thresholds grossly intact, no mass/lesion of auricle.  Nose:  No masses/lesions of external nose, nasal mucosa, septum, and turbinates were within normal limits.  Mouth:  No mass/lesion of lips, teeth, gums, hard/soft palate, tongue, tonsils, or oropharynx.    Cardiovascular:  No pedal edema; Radial Pulses +2     Neck & Lymphatics:  No cervical lymphadenopathy, no neck mass/crepitus/ asymmetry, trachea is midline, no thyroid enlargement/tenderness/mass.    Psych: Oriented x3,  Alert with normal mood and affect.     Respiration/Chest:  Symmetric expansion during respiration, normal respiratory effort.    Skin:  Warm and intact. No ulcerations of face, scalp, neck.      Assessment & Plan:     Left sphenoid with active infection.  Culture taken    Epley performed today            NASAL ENDOSCOPY  Procedure: Risks, benefits, and alternatives of the procedure were discussed with the patient, and the patient consented to the nasal endoscopy with debridement.  The nasal cavity was sprayed with a topical decongestant and anesthetic . The endoscope was passed into each nostril and each nasal cavity was visualized.  On each side the nasal cavity, sinuses (if open), turbinates, and septum were examined with the findings described below.  Crusting and polypoid material was removed with suction and straight non thru cut forceps.   At the end of the examination, the scope was removed. The patient tolerated the procedure well with no complications.     Endoscopic Sinonasal Exam Findings:  -     Sinuses examined: bilateral maxillary, bilateral ethmoid anterior and posterior, bilateral sphenoid and bilateral frontal            The right sinus(es) have normal mucosa             High Dose Preservative Free IM 09/30/2015, 09/30/2016, 08/28/2017, 10/26/2019    Influenza, high dose seasonal 0 7 mL 09/27/2018, 10/03/2020    Pneumococcal Conjugate 13-Valent 08/05/2015    Pneumococcal Polysaccharide PPV23 05/02/2017    SARS-CoV-2 / COVID-19 mRNA IM (Pfizer-BioNTech) 03/08/2021, 03/29/2021    Zoster 08/28/2014    Zoster Vaccine Recombinant 08/28/2014      Health Maintenance:         Topic Date Due    Hepatitis C Screening  Never done    Colorectal Cancer Screening  04/27/2022         Topic Date Due    DTaP,Tdap,and Td Vaccines (1 - Tdap) Never done    Influenza Vaccine (1) 09/01/2021      Medicare Health Risk Assessment:     /76   Pulse 88   Resp 16   Ht 5' 7 75" (1 721 m)   Wt 94 8 kg (209 lb)   SpO2 (!) 16%   BMI 32 01 kg/m²      Maged Bradshaw is here for his Subsequent Wellness visit  Last Medicare Wellness visit information reviewed, patient interviewed, no change since last AWV  Health Risk Assessment:   Patient rates overall health as good  Patient feels that their physical health rating is same  Patient is very satisfied with their life  Eyesight was rated as same  Hearing was rated as same  Patient feels that their emotional and mental health rating is same  Patients states they are never, rarely angry  Patient states they are never, rarely unusually tired/fatigued  Pain experienced in the last 7 days has been some  Patient's pain rating has been 2/10  Patient states that he has experienced no weight loss or gain in last 6 months  Depression Screening:   PHQ-2 Score: 0      Fall Risk Screening: In the past year, patient has experienced: no history of falling in past year      Home Safety:  Patient has trouble with stairs inside or outside of their home  Patient has working smoke alarms and has working carbon monoxide detector  Home safety hazards include: loose rugs on the floor  Nutrition:   Current diet is Regular       Medications:   Patient is currently The left sinus(es) have normal mucosa  -     Nasal secretions: thick purulence in left sphenoid  -     Nasal septum: RIGHT mild deviation   -     Inferior turbinate: normal bilaterally  -     Middle turbinate: turbinate partially resected bilaterally  -     Other findings: - no mass or obstructive lesion -    Assessment & Plan:  See today's clinic note.   taking over-the-counter supplements  OTC medications include: multi vitamin, acid reducer, low dose aspirin  Patient is able to manage medications  Activities of Daily Living (ADLs)/Instrumental Activities of Daily Living (IADLs):   Walk and transfer into and out of bed and chair?: Yes  Dress and groom yourself?: Yes    Bathe or shower yourself?: Yes    Feed yourself? Yes  Do your laundry/housekeeping?: Yes  Manage your money, pay your bills and track your expenses?: Yes  Make your own meals?: Yes    Do your own shopping?: Yes    Previous Hospitalizations:   Any hospitalizations or ED visits within the last 12 months?: No      Advance Care Planning:   Living will: Yes    Durable POA for healthcare: Yes    Advanced directive: Yes      Cognitive Screening:   Provider or family/friend/caregiver concerned regarding cognition?: No    PREVENTIVE SCREENINGS      Cardiovascular Screening:    General: Screening Not Indicated, History Lipid Disorder and Screening Current      Diabetes Screening:     General: Screening Current      Colorectal Cancer Screening:     General: Screening Current      Prostate Cancer Screening:    General: Screening Current      Osteoporosis Screening:    General: Screening Not Indicated      Abdominal Aortic Aneurysm (AAA) Screening:    Risk factors include: age between 73-69 yo and tobacco use        General: Screening Not Indicated      Lung Cancer Screening:     General: Screening Not Indicated      Hepatitis C Screening:    General: Screening Current    Screening, Brief Intervention, and Referral to Treatment (SBIRT)    Screening  Typical number of drinks in a day: 4  Typical number of drinks in a week: 10  Interpretation: Risky drinking behavior  AUDIT-C Screenin) How often did you have a drink containing alcohol in the past year? 4 or more times a week  2) How many drinks did you have on a typical day when you were drinking in the past year?  3 to 4  3) How often did you have 6 or more drinks on one occasion in the past year? monthly    AUDIT-C Score: 6  Interpretation: Score 4-12 (male): POSITIVE screen for alcohol misuse    AUDIT Screenin) How often during the last year have you found that you were not able to stop drinking once you had started? 0 - never  5) How often during the last year have you failed to do what was normally expected from you because of drinking? 0 - never  6) How often during the last year have you needed a first drink in the morning to get yourself going after a heavy drinking session?  0 - never  7) How often during the last year have you had a feeling of guilt or remorse after drinking? 0 - never  8) How often during the last year have you been unable to remember what happened the night before because you had been drinking? 0 - never  9) Have you or someone else been injured as a result of your drinking? 0 - no  10) Has a relative or friend or a doctor or another health worker been concerned about your drinking or suggested you cut down? 0 - no    AUDIT Score: 6  Interpretation: Low risk alcohol consumption    Single Item Drug Screening:  How often have you used an illegal drug (including marijuana) or a prescription medication for non-medical reasons in the past year? never    Single Item Drug Screen Score: 0  Interpretation: Negative screen for possible drug use disorder      Jessie Arshad MD

## 2021-09-03 ENCOUNTER — OFFICE VISIT (OUTPATIENT)
Dept: OPHTHALMOLOGY | Facility: CLINIC | Age: 76
End: 2021-09-03
Payer: MEDICARE

## 2021-09-03 DIAGNOSIS — H35.373 EPIRETINAL MEMBRANE (ERM) OF BOTH EYES: ICD-10-CM

## 2021-09-03 DIAGNOSIS — H04.129 DRYNESS, EYE: ICD-10-CM

## 2021-09-03 DIAGNOSIS — Z79.4 CONTROLLED TYPE 2 DIABETES MELLITUS WITHOUT COMPLICATION, WITH LONG-TERM CURRENT USE OF INSULIN: Primary | ICD-10-CM

## 2021-09-03 DIAGNOSIS — H40.013 OPEN ANGLE WITH BORDERLINE FINDINGS AND LOW GLAUCOMA RISK IN BOTH EYES: ICD-10-CM

## 2021-09-03 DIAGNOSIS — Z96.1 PSEUDOPHAKIA: ICD-10-CM

## 2021-09-03 DIAGNOSIS — E11.9 CONTROLLED TYPE 2 DIABETES MELLITUS WITHOUT COMPLICATION, WITH LONG-TERM CURRENT USE OF INSULIN: Primary | ICD-10-CM

## 2021-09-03 PROCEDURE — 1157F ADVNC CARE PLAN IN RCRD: CPT | Mod: CPTII,S$GLB,, | Performed by: OPHTHALMOLOGY

## 2021-09-03 PROCEDURE — 1159F MED LIST DOCD IN RCRD: CPT | Mod: CPTII,S$GLB,, | Performed by: OPHTHALMOLOGY

## 2021-09-03 PROCEDURE — 1160F PR REVIEW ALL MEDS BY PRESCRIBER/CLIN PHARMACIST DOCUMENTED: ICD-10-PCS | Mod: CPTII,S$GLB,, | Performed by: OPHTHALMOLOGY

## 2021-09-03 PROCEDURE — 92134 POSTERIOR SEGMENT OCT RETINA (OCULAR COHERENCE TOMOGRAPHY)-BOTH EYES: ICD-10-PCS | Mod: S$GLB,,, | Performed by: OPHTHALMOLOGY

## 2021-09-03 PROCEDURE — 1126F AMNT PAIN NOTED NONE PRSNT: CPT | Mod: CPTII,S$GLB,, | Performed by: OPHTHALMOLOGY

## 2021-09-03 PROCEDURE — 99999 PR PBB SHADOW E&M-EST. PATIENT-LVL II: ICD-10-PCS | Mod: PBBFAC,,, | Performed by: OPHTHALMOLOGY

## 2021-09-03 PROCEDURE — 99213 PR OFFICE/OUTPT VISIT, EST, LEVL III, 20-29 MIN: ICD-10-PCS | Mod: S$GLB,,, | Performed by: OPHTHALMOLOGY

## 2021-09-03 PROCEDURE — 99999 PR PBB SHADOW E&M-EST. PATIENT-LVL II: CPT | Mod: PBBFAC,,, | Performed by: OPHTHALMOLOGY

## 2021-09-03 PROCEDURE — 3051F PR MOST RECENT HEMOGLOBIN A1C LEVEL 7.0 - < 8.0%: ICD-10-PCS | Mod: CPTII,S$GLB,, | Performed by: OPHTHALMOLOGY

## 2021-09-03 PROCEDURE — 3051F HG A1C>EQUAL 7.0%<8.0%: CPT | Mod: CPTII,S$GLB,, | Performed by: OPHTHALMOLOGY

## 2021-09-03 PROCEDURE — 99213 OFFICE O/P EST LOW 20 MIN: CPT | Mod: S$GLB,,, | Performed by: OPHTHALMOLOGY

## 2021-09-03 PROCEDURE — 1126F PR PAIN SEVERITY QUANTIFIED, NO PAIN PRESENT: ICD-10-PCS | Mod: CPTII,S$GLB,, | Performed by: OPHTHALMOLOGY

## 2021-09-03 PROCEDURE — 92134 CPTRZ OPH DX IMG PST SGM RTA: CPT | Mod: S$GLB,,, | Performed by: OPHTHALMOLOGY

## 2021-09-03 PROCEDURE — 1159F PR MEDICATION LIST DOCUMENTED IN MEDICAL RECORD: ICD-10-PCS | Mod: CPTII,S$GLB,, | Performed by: OPHTHALMOLOGY

## 2021-09-03 PROCEDURE — 1157F PR ADVANCE CARE PLAN OR EQUIV PRESENT IN MEDICAL RECORD: ICD-10-PCS | Mod: CPTII,S$GLB,, | Performed by: OPHTHALMOLOGY

## 2021-09-03 PROCEDURE — 1160F RVW MEDS BY RX/DR IN RCRD: CPT | Mod: CPTII,S$GLB,, | Performed by: OPHTHALMOLOGY

## 2021-09-13 ENCOUNTER — OFFICE VISIT (OUTPATIENT)
Dept: CARDIOLOGY | Facility: CLINIC | Age: 76
End: 2021-09-13
Payer: MEDICARE

## 2021-09-13 VITALS
HEART RATE: 77 BPM | BODY MASS INDEX: 36.95 KG/M2 | WEIGHT: 243.81 LBS | HEIGHT: 68 IN | DIASTOLIC BLOOD PRESSURE: 82 MMHG | OXYGEN SATURATION: 98 % | SYSTOLIC BLOOD PRESSURE: 138 MMHG

## 2021-09-13 DIAGNOSIS — I73.9 PVD (PERIPHERAL VASCULAR DISEASE): ICD-10-CM

## 2021-09-13 DIAGNOSIS — I10 ESSENTIAL HYPERTENSION: Chronic | ICD-10-CM

## 2021-09-13 DIAGNOSIS — I48.0 PAROXYSMAL ATRIAL FIBRILLATION: ICD-10-CM

## 2021-09-13 DIAGNOSIS — E11.22 TYPE 2 DIABETES MELLITUS WITH CHRONIC KIDNEY DISEASE, WITH LONG-TERM CURRENT USE OF INSULIN, UNSPECIFIED CKD STAGE: Chronic | ICD-10-CM

## 2021-09-13 DIAGNOSIS — E78.2 MIXED HYPERLIPIDEMIA: ICD-10-CM

## 2021-09-13 DIAGNOSIS — Z95.1 S/P CABG X 2: ICD-10-CM

## 2021-09-13 DIAGNOSIS — Z78.9 STATIN INTOLERANCE: Chronic | ICD-10-CM

## 2021-09-13 DIAGNOSIS — G47.33 OSA ON CPAP: Chronic | ICD-10-CM

## 2021-09-13 DIAGNOSIS — I49.3 PVC (PREMATURE VENTRICULAR CONTRACTION): Primary | ICD-10-CM

## 2021-09-13 DIAGNOSIS — Z79.4 TYPE 2 DIABETES MELLITUS WITH CHRONIC KIDNEY DISEASE, WITH LONG-TERM CURRENT USE OF INSULIN, UNSPECIFIED CKD STAGE: Chronic | ICD-10-CM

## 2021-09-13 DIAGNOSIS — I65.23 BILATERAL CAROTID ARTERY STENOSIS: Chronic | ICD-10-CM

## 2021-09-13 PROCEDURE — 1159F PR MEDICATION LIST DOCUMENTED IN MEDICAL RECORD: ICD-10-PCS | Mod: CPTII,S$GLB,, | Performed by: INTERNAL MEDICINE

## 2021-09-13 PROCEDURE — 1101F PT FALLS ASSESS-DOCD LE1/YR: CPT | Mod: CPTII,S$GLB,, | Performed by: INTERNAL MEDICINE

## 2021-09-13 PROCEDURE — 1157F PR ADVANCE CARE PLAN OR EQUIV PRESENT IN MEDICAL RECORD: ICD-10-PCS | Mod: CPTII,S$GLB,, | Performed by: INTERNAL MEDICINE

## 2021-09-13 PROCEDURE — 3051F HG A1C>EQUAL 7.0%<8.0%: CPT | Mod: CPTII,S$GLB,, | Performed by: INTERNAL MEDICINE

## 2021-09-13 PROCEDURE — 3079F DIAST BP 80-89 MM HG: CPT | Mod: CPTII,S$GLB,, | Performed by: INTERNAL MEDICINE

## 2021-09-13 PROCEDURE — 99999 PR PBB SHADOW E&M-EST. PATIENT-LVL III: ICD-10-PCS | Mod: PBBFAC,,, | Performed by: INTERNAL MEDICINE

## 2021-09-13 PROCEDURE — 4010F ACE/ARB THERAPY RXD/TAKEN: CPT | Mod: CPTII,S$GLB,, | Performed by: INTERNAL MEDICINE

## 2021-09-13 PROCEDURE — 3066F NEPHROPATHY DOC TX: CPT | Mod: CPTII,S$GLB,, | Performed by: INTERNAL MEDICINE

## 2021-09-13 PROCEDURE — 99214 PR OFFICE/OUTPT VISIT, EST, LEVL IV, 30-39 MIN: ICD-10-PCS | Mod: S$GLB,,, | Performed by: INTERNAL MEDICINE

## 2021-09-13 PROCEDURE — 1126F AMNT PAIN NOTED NONE PRSNT: CPT | Mod: CPTII,S$GLB,, | Performed by: INTERNAL MEDICINE

## 2021-09-13 PROCEDURE — 99999 PR PBB SHADOW E&M-EST. PATIENT-LVL III: CPT | Mod: PBBFAC,,, | Performed by: INTERNAL MEDICINE

## 2021-09-13 PROCEDURE — 1126F PR PAIN SEVERITY QUANTIFIED, NO PAIN PRESENT: ICD-10-PCS | Mod: CPTII,S$GLB,, | Performed by: INTERNAL MEDICINE

## 2021-09-13 PROCEDURE — 1101F PR PT FALLS ASSESS DOC 0-1 FALLS W/OUT INJ PAST YR: ICD-10-PCS | Mod: CPTII,S$GLB,, | Performed by: INTERNAL MEDICINE

## 2021-09-13 PROCEDURE — 3288F FALL RISK ASSESSMENT DOCD: CPT | Mod: CPTII,S$GLB,, | Performed by: INTERNAL MEDICINE

## 2021-09-13 PROCEDURE — 1160F PR REVIEW ALL MEDS BY PRESCRIBER/CLIN PHARMACIST DOCUMENTED: ICD-10-PCS | Mod: CPTII,S$GLB,, | Performed by: INTERNAL MEDICINE

## 2021-09-13 PROCEDURE — 4010F PR ACE/ARB THEARPY RXD/TAKEN: ICD-10-PCS | Mod: CPTII,S$GLB,, | Performed by: INTERNAL MEDICINE

## 2021-09-13 PROCEDURE — 3060F PR POS MICROALBUMINURIA RESULT DOCUMENTED/REVIEW: ICD-10-PCS | Mod: CPTII,S$GLB,, | Performed by: INTERNAL MEDICINE

## 2021-09-13 PROCEDURE — 3079F PR MOST RECENT DIASTOLIC BLOOD PRESSURE 80-89 MM HG: ICD-10-PCS | Mod: CPTII,S$GLB,, | Performed by: INTERNAL MEDICINE

## 2021-09-13 PROCEDURE — 3288F PR FALLS RISK ASSESSMENT DOCUMENTED: ICD-10-PCS | Mod: CPTII,S$GLB,, | Performed by: INTERNAL MEDICINE

## 2021-09-13 PROCEDURE — 1160F RVW MEDS BY RX/DR IN RCRD: CPT | Mod: CPTII,S$GLB,, | Performed by: INTERNAL MEDICINE

## 2021-09-13 PROCEDURE — 3051F PR MOST RECENT HEMOGLOBIN A1C LEVEL 7.0 - < 8.0%: ICD-10-PCS | Mod: CPTII,S$GLB,, | Performed by: INTERNAL MEDICINE

## 2021-09-13 PROCEDURE — 1159F MED LIST DOCD IN RCRD: CPT | Mod: CPTII,S$GLB,, | Performed by: INTERNAL MEDICINE

## 2021-09-13 PROCEDURE — 1157F ADVNC CARE PLAN IN RCRD: CPT | Mod: CPTII,S$GLB,, | Performed by: INTERNAL MEDICINE

## 2021-09-13 PROCEDURE — 3066F PR DOCUMENTATION OF TREATMENT FOR NEPHROPATHY: ICD-10-PCS | Mod: CPTII,S$GLB,, | Performed by: INTERNAL MEDICINE

## 2021-09-13 PROCEDURE — 99214 OFFICE O/P EST MOD 30 MIN: CPT | Mod: S$GLB,,, | Performed by: INTERNAL MEDICINE

## 2021-09-13 PROCEDURE — 3060F POS MICROALBUMINURIA REV: CPT | Mod: CPTII,S$GLB,, | Performed by: INTERNAL MEDICINE

## 2021-09-13 PROCEDURE — 3075F SYST BP GE 130 - 139MM HG: CPT | Mod: CPTII,S$GLB,, | Performed by: INTERNAL MEDICINE

## 2021-09-13 PROCEDURE — 3075F PR MOST RECENT SYSTOLIC BLOOD PRESS GE 130-139MM HG: ICD-10-PCS | Mod: CPTII,S$GLB,, | Performed by: INTERNAL MEDICINE

## 2021-09-14 ENCOUNTER — TELEPHONE (OUTPATIENT)
Dept: PULMONOLOGY | Facility: CLINIC | Age: 76
End: 2021-09-14

## 2021-09-14 ENCOUNTER — TELEPHONE (OUTPATIENT)
Dept: ADMINISTRATIVE | Facility: HOSPITAL | Age: 76
End: 2021-09-14

## 2021-09-17 ENCOUNTER — LAB VISIT (OUTPATIENT)
Dept: LAB | Facility: HOSPITAL | Age: 76
End: 2021-09-17
Attending: FAMILY MEDICINE
Payer: MEDICARE

## 2021-09-17 DIAGNOSIS — E11.29 TYPE 2 DIABETES MELLITUS WITH MICROALBUMINURIA, WITH LONG-TERM CURRENT USE OF INSULIN: Chronic | ICD-10-CM

## 2021-09-17 DIAGNOSIS — R80.9 TYPE 2 DIABETES MELLITUS WITH MICROALBUMINURIA, WITH LONG-TERM CURRENT USE OF INSULIN: Chronic | ICD-10-CM

## 2021-09-17 DIAGNOSIS — E78.2 MIXED HYPERLIPIDEMIA: ICD-10-CM

## 2021-09-17 DIAGNOSIS — Z79.4 TYPE 2 DIABETES MELLITUS WITH MICROALBUMINURIA, WITH LONG-TERM CURRENT USE OF INSULIN: Chronic | ICD-10-CM

## 2021-09-17 DIAGNOSIS — E03.9 ACQUIRED HYPOTHYROIDISM: Chronic | ICD-10-CM

## 2021-09-17 DIAGNOSIS — I10 ESSENTIAL HYPERTENSION: ICD-10-CM

## 2021-09-17 LAB
ALBUMIN SERPL BCP-MCNC: 3.5 G/DL (ref 3.5–5.2)
ALBUMIN SERPL BCP-MCNC: 3.5 G/DL (ref 3.5–5.2)
ALP SERPL-CCNC: 75 U/L (ref 55–135)
ALP SERPL-CCNC: 75 U/L (ref 55–135)
ALT SERPL W/O P-5'-P-CCNC: 28 U/L (ref 10–44)
ALT SERPL W/O P-5'-P-CCNC: 28 U/L (ref 10–44)
ANION GAP SERPL CALC-SCNC: 11 MMOL/L (ref 8–16)
AST SERPL-CCNC: 24 U/L (ref 10–40)
AST SERPL-CCNC: 24 U/L (ref 10–40)
BILIRUB DIRECT SERPL-MCNC: 0.2 MG/DL (ref 0.1–0.3)
BILIRUB SERPL-MCNC: 0.6 MG/DL (ref 0.1–1)
BILIRUB SERPL-MCNC: 0.6 MG/DL (ref 0.1–1)
BUN SERPL-MCNC: 14 MG/DL (ref 8–23)
CALCIUM SERPL-MCNC: 9.3 MG/DL (ref 8.7–10.5)
CHLORIDE SERPL-SCNC: 102 MMOL/L (ref 95–110)
CHOLEST SERPL-MCNC: 184 MG/DL (ref 120–199)
CHOLEST/HDLC SERPL: 4.2 {RATIO} (ref 2–5)
CO2 SERPL-SCNC: 24 MMOL/L (ref 23–29)
CREAT SERPL-MCNC: 0.9 MG/DL (ref 0.5–1.4)
EST. GFR  (AFRICAN AMERICAN): >60 ML/MIN/1.73 M^2
EST. GFR  (NON AFRICAN AMERICAN): >60 ML/MIN/1.73 M^2
GLUCOSE SERPL-MCNC: 134 MG/DL (ref 70–110)
HDLC SERPL-MCNC: 44 MG/DL (ref 40–75)
HDLC SERPL: 23.9 % (ref 20–50)
LDLC SERPL CALC-MCNC: 97.6 MG/DL (ref 63–159)
NONHDLC SERPL-MCNC: 140 MG/DL
POTASSIUM SERPL-SCNC: 3.8 MMOL/L (ref 3.5–5.1)
PROT SERPL-MCNC: 7.1 G/DL (ref 6–8.4)
PROT SERPL-MCNC: 7.1 G/DL (ref 6–8.4)
SODIUM SERPL-SCNC: 137 MMOL/L (ref 136–145)
T4 FREE SERPL-MCNC: 0.92 NG/DL (ref 0.71–1.51)
TRIGL SERPL-MCNC: 212 MG/DL (ref 30–150)
TSH SERPL DL<=0.005 MIU/L-ACNC: 0.94 UIU/ML (ref 0.4–4)

## 2021-09-17 PROCEDURE — 83036 HEMOGLOBIN GLYCOSYLATED A1C: CPT | Performed by: FAMILY MEDICINE

## 2021-09-17 PROCEDURE — 80076 HEPATIC FUNCTION PANEL: CPT | Performed by: INTERNAL MEDICINE

## 2021-09-17 PROCEDURE — 80053 COMPREHEN METABOLIC PANEL: CPT | Performed by: FAMILY MEDICINE

## 2021-09-17 PROCEDURE — 80061 LIPID PANEL: CPT | Performed by: INTERNAL MEDICINE

## 2021-09-17 PROCEDURE — 84443 ASSAY THYROID STIM HORMONE: CPT | Performed by: FAMILY MEDICINE

## 2021-09-17 PROCEDURE — 36415 COLL VENOUS BLD VENIPUNCTURE: CPT | Mod: PO | Performed by: FAMILY MEDICINE

## 2021-09-17 PROCEDURE — 84439 ASSAY OF FREE THYROXINE: CPT | Performed by: FAMILY MEDICINE

## 2021-09-18 LAB
ESTIMATED AVG GLUCOSE: 160 MG/DL (ref 68–131)
HBA1C MFR BLD: 7.2 % (ref 4–5.6)

## 2021-09-20 ENCOUNTER — TELEPHONE (OUTPATIENT)
Dept: CARDIOLOGY | Facility: CLINIC | Age: 76
End: 2021-09-20

## 2021-09-20 ENCOUNTER — TELEPHONE (OUTPATIENT)
Dept: HEPATOLOGY | Facility: CLINIC | Age: 76
End: 2021-09-20

## 2021-09-28 ENCOUNTER — HOSPITAL ENCOUNTER (OUTPATIENT)
Dept: CARDIOLOGY | Facility: HOSPITAL | Age: 76
Discharge: HOME | End: 2021-09-28
Attending: INTERNAL MEDICINE
Payer: MEDICARE

## 2021-09-28 VITALS
SYSTOLIC BLOOD PRESSURE: 164 MMHG | WEIGHT: 243 LBS | BODY MASS INDEX: 36.83 KG/M2 | HEIGHT: 68 IN | WEIGHT: 243 LBS | BODY MASS INDEX: 36.83 KG/M2 | SYSTOLIC BLOOD PRESSURE: 164 MMHG | DIASTOLIC BLOOD PRESSURE: 72 MMHG | DIASTOLIC BLOOD PRESSURE: 72 MMHG | HEIGHT: 68 IN

## 2021-09-28 DIAGNOSIS — I73.9 PVD (PERIPHERAL VASCULAR DISEASE): ICD-10-CM

## 2021-09-28 LAB
LEFT ANT TIBIAL SYS PSV: 106 CM/S
LEFT ARM BP: 161 MMHG
LEFT CFA PSV: 146 CM/S
LEFT PERONEAL SYS PSV: 47 CM/S
LEFT POPLITEAL PSV: 95 CM/S
LEFT POST TIBIAL SYS PSV: 106 CM/S
LEFT PROFUNDA SYS PSV: 170 CM/S
LEFT SUPER FEMORAL DIST SYS PSV: 81 CM/S
LEFT SUPER FEMORAL MID SYS PSV: 97 CM/S
LEFT SUPER FEMORAL OSTIAL SYS PSV: 106 CM/S
LEFT SUPER FEMORAL PROX SYS PSV: 104 CM/S
LEFT TBI: 0.62
LEFT TIB/PER TRUNK SYS PSV: 59 CM/S
LEFT TOE PRESSURE: 100 MMHG
RIGHT ANT TIBIAL SYS PSV: 86 CM/S
RIGHT ARM BP: 160 MMHG
RIGHT CFA PSV: 419 CM/S
RIGHT PERONEAL SYS PSV: 65 CM/S
RIGHT POPLITEAL PSV: 71 CM/S
RIGHT POST TIBIAL SYS PSV: 114 CM/S
RIGHT PROFUNDA SYS PSV: 259 CM/S
RIGHT SUPER FEMORAL DIST SYS PSV: 76 CM/S
RIGHT SUPER FEMORAL MID SYS PSV: 118 CM/S
RIGHT SUPER FEMORAL OSTIAL SYS PSV: 208 CM/S
RIGHT SUPER FEMORAL PROX SYS PSV: 102 CM/S
RIGHT TBI: 0.52
RIGHT TIB/PER TRUNK SYS PSV: 62 CM/S
RIGHT TOE PRESSURE: 84 MMHG

## 2021-09-28 PROCEDURE — 93922 UPR/L XTREMITY ART 2 LEVELS: CPT | Mod: 26,,, | Performed by: INTERNAL MEDICINE

## 2021-09-28 PROCEDURE — 93925 LOWER EXTREMITY STUDY: CPT

## 2021-09-28 PROCEDURE — 93925 CV US DOPPLER ARTERIAL LEGS BILATERAL (CUPID ONLY): ICD-10-PCS | Mod: 26,,, | Performed by: INTERNAL MEDICINE

## 2021-09-28 PROCEDURE — 93922 UPR/L XTREMITY ART 2 LEVELS: CPT

## 2021-09-28 PROCEDURE — 93922 ANKLE BRACHIAL INDICES (ABI): ICD-10-PCS | Mod: 26,,, | Performed by: INTERNAL MEDICINE

## 2021-09-28 PROCEDURE — 93925 LOWER EXTREMITY STUDY: CPT | Mod: 26,,, | Performed by: INTERNAL MEDICINE

## 2021-09-29 ENCOUNTER — TELEPHONE (OUTPATIENT)
Dept: CARDIOLOGY | Facility: CLINIC | Age: 76
End: 2021-09-29

## 2021-09-29 ENCOUNTER — OFFICE VISIT (OUTPATIENT)
Dept: INTERNAL MEDICINE | Facility: CLINIC | Age: 76
End: 2021-09-29
Payer: MEDICARE

## 2021-09-29 VITALS
BODY MASS INDEX: 36.86 KG/M2 | HEIGHT: 68 IN | TEMPERATURE: 99 F | OXYGEN SATURATION: 96 % | DIASTOLIC BLOOD PRESSURE: 60 MMHG | HEART RATE: 96 BPM | SYSTOLIC BLOOD PRESSURE: 104 MMHG | WEIGHT: 243.19 LBS | RESPIRATION RATE: 20 BRPM

## 2021-09-29 DIAGNOSIS — Z78.9 STATIN INTOLERANCE: Chronic | ICD-10-CM

## 2021-09-29 DIAGNOSIS — J45.20 MILD INTERMITTENT ASTHMA WITHOUT COMPLICATION: ICD-10-CM

## 2021-09-29 DIAGNOSIS — I10 ESSENTIAL HYPERTENSION: Chronic | ICD-10-CM

## 2021-09-29 DIAGNOSIS — E11.29 TYPE 2 DIABETES MELLITUS WITH MICROALBUMINURIA, WITH LONG-TERM CURRENT USE OF INSULIN: Chronic | ICD-10-CM

## 2021-09-29 DIAGNOSIS — E66.01 CLASS 2 SEVERE OBESITY DUE TO EXCESS CALORIES WITH SERIOUS COMORBIDITY AND BODY MASS INDEX (BMI) OF 37.0 TO 37.9 IN ADULT: ICD-10-CM

## 2021-09-29 DIAGNOSIS — E11.22 TYPE 2 DIABETES MELLITUS WITH CHRONIC KIDNEY DISEASE, WITH LONG-TERM CURRENT USE OF INSULIN, UNSPECIFIED CKD STAGE: Primary | Chronic | ICD-10-CM

## 2021-09-29 DIAGNOSIS — R80.9 TYPE 2 DIABETES MELLITUS WITH MICROALBUMINURIA, WITH LONG-TERM CURRENT USE OF INSULIN: Chronic | ICD-10-CM

## 2021-09-29 DIAGNOSIS — E78.2 MIXED HYPERLIPIDEMIA: ICD-10-CM

## 2021-09-29 DIAGNOSIS — Z79.4 TYPE 2 DIABETES MELLITUS WITH MICROALBUMINURIA, WITH LONG-TERM CURRENT USE OF INSULIN: Chronic | ICD-10-CM

## 2021-09-29 DIAGNOSIS — R35.1 BENIGN PROSTATIC HYPERPLASIA WITH NOCTURIA: Chronic | ICD-10-CM

## 2021-09-29 DIAGNOSIS — K74.60 CIRRHOSIS OF LIVER WITHOUT ASCITES, UNSPECIFIED HEPATIC CIRRHOSIS TYPE: ICD-10-CM

## 2021-09-29 DIAGNOSIS — E03.9 ACQUIRED HYPOTHYROIDISM: Chronic | ICD-10-CM

## 2021-09-29 DIAGNOSIS — N40.1 BENIGN PROSTATIC HYPERPLASIA WITH NOCTURIA: Chronic | ICD-10-CM

## 2021-09-29 DIAGNOSIS — Z79.4 TYPE 2 DIABETES MELLITUS WITH CHRONIC KIDNEY DISEASE, WITH LONG-TERM CURRENT USE OF INSULIN, UNSPECIFIED CKD STAGE: Primary | Chronic | ICD-10-CM

## 2021-09-29 DIAGNOSIS — I70.0 AORTIC ATHEROSCLEROSIS: ICD-10-CM

## 2021-09-29 DIAGNOSIS — I25.10 CORONARY ARTERY DISEASE INVOLVING NATIVE CORONARY ARTERY OF NATIVE HEART WITHOUT ANGINA PECTORIS: Chronic | ICD-10-CM

## 2021-09-29 PROCEDURE — 4010F PR ACE/ARB THEARPY RXD/TAKEN: ICD-10-PCS | Mod: CPTII,S$GLB,, | Performed by: PEDIATRICS

## 2021-09-29 PROCEDURE — 99214 PR OFFICE/OUTPT VISIT, EST, LEVL IV, 30-39 MIN: ICD-10-PCS | Mod: S$GLB,,, | Performed by: PEDIATRICS

## 2021-09-29 PROCEDURE — 3051F PR MOST RECENT HEMOGLOBIN A1C LEVEL 7.0 - < 8.0%: ICD-10-PCS | Mod: CPTII,S$GLB,, | Performed by: PEDIATRICS

## 2021-09-29 PROCEDURE — 3066F PR DOCUMENTATION OF TREATMENT FOR NEPHROPATHY: ICD-10-PCS | Mod: CPTII,S$GLB,, | Performed by: PEDIATRICS

## 2021-09-29 PROCEDURE — 3074F SYST BP LT 130 MM HG: CPT | Mod: CPTII,S$GLB,, | Performed by: PEDIATRICS

## 2021-09-29 PROCEDURE — 1101F PR PT FALLS ASSESS DOC 0-1 FALLS W/OUT INJ PAST YR: ICD-10-PCS | Mod: CPTII,S$GLB,, | Performed by: PEDIATRICS

## 2021-09-29 PROCEDURE — 3074F PR MOST RECENT SYSTOLIC BLOOD PRESSURE < 130 MM HG: ICD-10-PCS | Mod: CPTII,S$GLB,, | Performed by: PEDIATRICS

## 2021-09-29 PROCEDURE — 1159F MED LIST DOCD IN RCRD: CPT | Mod: CPTII,S$GLB,, | Performed by: PEDIATRICS

## 2021-09-29 PROCEDURE — 3288F PR FALLS RISK ASSESSMENT DOCUMENTED: ICD-10-PCS | Mod: CPTII,S$GLB,, | Performed by: PEDIATRICS

## 2021-09-29 PROCEDURE — 1126F AMNT PAIN NOTED NONE PRSNT: CPT | Mod: CPTII,S$GLB,, | Performed by: PEDIATRICS

## 2021-09-29 PROCEDURE — 3288F FALL RISK ASSESSMENT DOCD: CPT | Mod: CPTII,S$GLB,, | Performed by: PEDIATRICS

## 2021-09-29 PROCEDURE — 1157F PR ADVANCE CARE PLAN OR EQUIV PRESENT IN MEDICAL RECORD: ICD-10-PCS | Mod: CPTII,S$GLB,, | Performed by: PEDIATRICS

## 2021-09-29 PROCEDURE — 4010F ACE/ARB THERAPY RXD/TAKEN: CPT | Mod: CPTII,S$GLB,, | Performed by: PEDIATRICS

## 2021-09-29 PROCEDURE — 3060F PR POS MICROALBUMINURIA RESULT DOCUMENTED/REVIEW: ICD-10-PCS | Mod: CPTII,S$GLB,, | Performed by: PEDIATRICS

## 2021-09-29 PROCEDURE — 1157F ADVNC CARE PLAN IN RCRD: CPT | Mod: CPTII,S$GLB,, | Performed by: PEDIATRICS

## 2021-09-29 PROCEDURE — 1126F PR PAIN SEVERITY QUANTIFIED, NO PAIN PRESENT: ICD-10-PCS | Mod: CPTII,S$GLB,, | Performed by: PEDIATRICS

## 2021-09-29 PROCEDURE — 99999 PR PBB SHADOW E&M-EST. PATIENT-LVL V: ICD-10-PCS | Mod: PBBFAC,,, | Performed by: PEDIATRICS

## 2021-09-29 PROCEDURE — 3066F NEPHROPATHY DOC TX: CPT | Mod: CPTII,S$GLB,, | Performed by: PEDIATRICS

## 2021-09-29 PROCEDURE — 3060F POS MICROALBUMINURIA REV: CPT | Mod: CPTII,S$GLB,, | Performed by: PEDIATRICS

## 2021-09-29 PROCEDURE — 3078F DIAST BP <80 MM HG: CPT | Mod: CPTII,S$GLB,, | Performed by: PEDIATRICS

## 2021-09-29 PROCEDURE — 1159F PR MEDICATION LIST DOCUMENTED IN MEDICAL RECORD: ICD-10-PCS | Mod: CPTII,S$GLB,, | Performed by: PEDIATRICS

## 2021-09-29 PROCEDURE — 3051F HG A1C>EQUAL 7.0%<8.0%: CPT | Mod: CPTII,S$GLB,, | Performed by: PEDIATRICS

## 2021-09-29 PROCEDURE — 1101F PT FALLS ASSESS-DOCD LE1/YR: CPT | Mod: CPTII,S$GLB,, | Performed by: PEDIATRICS

## 2021-09-29 PROCEDURE — 1160F PR REVIEW ALL MEDS BY PRESCRIBER/CLIN PHARMACIST DOCUMENTED: ICD-10-PCS | Mod: CPTII,S$GLB,, | Performed by: PEDIATRICS

## 2021-09-29 PROCEDURE — 99214 OFFICE O/P EST MOD 30 MIN: CPT | Mod: S$GLB,,, | Performed by: PEDIATRICS

## 2021-09-29 PROCEDURE — 1160F RVW MEDS BY RX/DR IN RCRD: CPT | Mod: CPTII,S$GLB,, | Performed by: PEDIATRICS

## 2021-09-29 PROCEDURE — 99999 PR PBB SHADOW E&M-EST. PATIENT-LVL V: CPT | Mod: PBBFAC,,, | Performed by: PEDIATRICS

## 2021-09-29 PROCEDURE — 3078F PR MOST RECENT DIASTOLIC BLOOD PRESSURE < 80 MM HG: ICD-10-PCS | Mod: CPTII,S$GLB,, | Performed by: PEDIATRICS

## 2021-09-29 RX ORDER — EMPAGLIFLOZIN 10 MG/1
10 TABLET, FILM COATED ORAL DAILY
Qty: 90 TABLET | Refills: 3 | Status: SHIPPED | OUTPATIENT
Start: 2021-09-29 | End: 2021-12-27

## 2021-10-20 DIAGNOSIS — R80.9 TYPE 2 DIABETES MELLITUS WITH MICROALBUMINURIA, WITH LONG-TERM CURRENT USE OF INSULIN: Chronic | ICD-10-CM

## 2021-10-20 DIAGNOSIS — Z79.4 TYPE 2 DIABETES MELLITUS WITH MICROALBUMINURIA, WITH LONG-TERM CURRENT USE OF INSULIN: Chronic | ICD-10-CM

## 2021-10-20 DIAGNOSIS — E11.29 TYPE 2 DIABETES MELLITUS WITH MICROALBUMINURIA, WITH LONG-TERM CURRENT USE OF INSULIN: Chronic | ICD-10-CM

## 2021-10-21 RX ORDER — METFORMIN HYDROCHLORIDE 500 MG/1
TABLET ORAL
Qty: 180 TABLET | Refills: 0 | Status: SHIPPED | OUTPATIENT
Start: 2021-10-21 | End: 2022-01-18

## 2021-11-08 ENCOUNTER — TELEPHONE (OUTPATIENT)
Dept: ADMINISTRATIVE | Facility: HOSPITAL | Age: 76
End: 2021-11-08
Payer: MEDICARE

## 2021-11-29 ENCOUNTER — LAB VISIT (OUTPATIENT)
Dept: LAB | Facility: HOSPITAL | Age: 76
End: 2021-11-29
Attending: INTERNAL MEDICINE
Payer: MEDICARE

## 2021-11-29 ENCOUNTER — OFFICE VISIT (OUTPATIENT)
Dept: HEPATOLOGY | Facility: CLINIC | Age: 76
End: 2021-11-29
Payer: MEDICARE

## 2021-11-29 VITALS
WEIGHT: 243.19 LBS | HEIGHT: 68 IN | HEART RATE: 72 BPM | BODY MASS INDEX: 36.86 KG/M2 | DIASTOLIC BLOOD PRESSURE: 68 MMHG | SYSTOLIC BLOOD PRESSURE: 124 MMHG

## 2021-11-29 DIAGNOSIS — Z85.05 HISTORY OF HEPATOCELLULAR CARCINOMA: ICD-10-CM

## 2021-11-29 DIAGNOSIS — K74.69 OTHER CIRRHOSIS OF LIVER: ICD-10-CM

## 2021-11-29 DIAGNOSIS — K74.69 OTHER CIRRHOSIS OF LIVER: Primary | ICD-10-CM

## 2021-11-29 LAB
AFP SERPL-MCNC: 3.8 NG/ML (ref 0–8.4)
ALBUMIN SERPL BCP-MCNC: 3.9 G/DL (ref 3.5–5.2)
ALP SERPL-CCNC: 65 U/L (ref 55–135)
ALT SERPL W/O P-5'-P-CCNC: 25 U/L (ref 10–44)
ANION GAP SERPL CALC-SCNC: 10 MMOL/L (ref 8–16)
AST SERPL-CCNC: 26 U/L (ref 10–40)
BASOPHILS # BLD AUTO: 0.04 K/UL (ref 0–0.2)
BASOPHILS NFR BLD: 0.7 % (ref 0–1.9)
BILIRUB SERPL-MCNC: 0.4 MG/DL (ref 0.1–1)
BUN SERPL-MCNC: 26 MG/DL (ref 8–23)
CALCIUM SERPL-MCNC: 9.6 MG/DL (ref 8.7–10.5)
CHLORIDE SERPL-SCNC: 104 MMOL/L (ref 95–110)
CO2 SERPL-SCNC: 28 MMOL/L (ref 23–29)
CREAT SERPL-MCNC: 1.4 MG/DL (ref 0.5–1.4)
DIFFERENTIAL METHOD: ABNORMAL
EOSINOPHIL # BLD AUTO: 0.2 K/UL (ref 0–0.5)
EOSINOPHIL NFR BLD: 2.8 % (ref 0–8)
ERYTHROCYTE [DISTWIDTH] IN BLOOD BY AUTOMATED COUNT: 13.5 % (ref 11.5–14.5)
EST. GFR  (AFRICAN AMERICAN): 56 ML/MIN/1.73 M^2
EST. GFR  (NON AFRICAN AMERICAN): 49 ML/MIN/1.73 M^2
GLUCOSE SERPL-MCNC: 178 MG/DL (ref 70–110)
HCT VFR BLD AUTO: 40.1 % (ref 40–54)
HGB BLD-MCNC: 13.5 G/DL (ref 14–18)
IMM GRANULOCYTES # BLD AUTO: 0.01 K/UL (ref 0–0.04)
IMM GRANULOCYTES NFR BLD AUTO: 0.2 % (ref 0–0.5)
INR PPP: 1 (ref 0.8–1.2)
LYMPHOCYTES # BLD AUTO: 1.6 K/UL (ref 1–4.8)
LYMPHOCYTES NFR BLD: 26.7 % (ref 18–48)
MCH RBC QN AUTO: 31.2 PG (ref 27–31)
MCHC RBC AUTO-ENTMCNC: 33.7 G/DL (ref 32–36)
MCV RBC AUTO: 93 FL (ref 82–98)
MONOCYTES # BLD AUTO: 0.7 K/UL (ref 0.3–1)
MONOCYTES NFR BLD: 10.9 % (ref 4–15)
NEUTROPHILS # BLD AUTO: 3.6 K/UL (ref 1.8–7.7)
NEUTROPHILS NFR BLD: 58.7 % (ref 38–73)
NRBC BLD-RTO: 0 /100 WBC
PLATELET # BLD AUTO: 169 K/UL (ref 150–450)
PMV BLD AUTO: 9.1 FL (ref 9.2–12.9)
POTASSIUM SERPL-SCNC: 4.3 MMOL/L (ref 3.5–5.1)
PROT SERPL-MCNC: 7.8 G/DL (ref 6–8.4)
PROTHROMBIN TIME: 11.1 SEC (ref 9–12.5)
RBC # BLD AUTO: 4.33 M/UL (ref 4.6–6.2)
SODIUM SERPL-SCNC: 142 MMOL/L (ref 136–145)
WBC # BLD AUTO: 6.07 K/UL (ref 3.9–12.7)

## 2021-11-29 PROCEDURE — 85025 COMPLETE CBC W/AUTO DIFF WBC: CPT | Performed by: INTERNAL MEDICINE

## 2021-11-29 PROCEDURE — 3066F NEPHROPATHY DOC TX: CPT | Mod: CPTII,S$GLB,, | Performed by: INTERNAL MEDICINE

## 2021-11-29 PROCEDURE — 85610 PROTHROMBIN TIME: CPT | Performed by: INTERNAL MEDICINE

## 2021-11-29 PROCEDURE — 3066F PR DOCUMENTATION OF TREATMENT FOR NEPHROPATHY: ICD-10-PCS | Mod: CPTII,S$GLB,, | Performed by: INTERNAL MEDICINE

## 2021-11-29 PROCEDURE — 99213 OFFICE O/P EST LOW 20 MIN: CPT | Mod: S$GLB,,, | Performed by: INTERNAL MEDICINE

## 2021-11-29 PROCEDURE — 3060F POS MICROALBUMINURIA REV: CPT | Mod: CPTII,S$GLB,, | Performed by: INTERNAL MEDICINE

## 2021-11-29 PROCEDURE — 1157F PR ADVANCE CARE PLAN OR EQUIV PRESENT IN MEDICAL RECORD: ICD-10-PCS | Mod: CPTII,S$GLB,, | Performed by: INTERNAL MEDICINE

## 2021-11-29 PROCEDURE — 99213 PR OFFICE/OUTPT VISIT, EST, LEVL III, 20-29 MIN: ICD-10-PCS | Mod: S$GLB,,, | Performed by: INTERNAL MEDICINE

## 2021-11-29 PROCEDURE — 3060F PR POS MICROALBUMINURIA RESULT DOCUMENTED/REVIEW: ICD-10-PCS | Mod: CPTII,S$GLB,, | Performed by: INTERNAL MEDICINE

## 2021-11-29 PROCEDURE — 1157F ADVNC CARE PLAN IN RCRD: CPT | Mod: CPTII,S$GLB,, | Performed by: INTERNAL MEDICINE

## 2021-11-29 PROCEDURE — 36415 COLL VENOUS BLD VENIPUNCTURE: CPT | Performed by: INTERNAL MEDICINE

## 2021-11-29 PROCEDURE — 82105 ALPHA-FETOPROTEIN SERUM: CPT | Performed by: INTERNAL MEDICINE

## 2021-11-29 PROCEDURE — 4010F PR ACE/ARB THEARPY RXD/TAKEN: ICD-10-PCS | Mod: CPTII,S$GLB,, | Performed by: INTERNAL MEDICINE

## 2021-11-29 PROCEDURE — 99999 PR PBB SHADOW E&M-EST. PATIENT-LVL V: CPT | Mod: PBBFAC,,, | Performed by: INTERNAL MEDICINE

## 2021-11-29 PROCEDURE — 80053 COMPREHEN METABOLIC PANEL: CPT | Performed by: INTERNAL MEDICINE

## 2021-11-29 PROCEDURE — 99999 PR PBB SHADOW E&M-EST. PATIENT-LVL V: ICD-10-PCS | Mod: PBBFAC,,, | Performed by: INTERNAL MEDICINE

## 2021-11-29 PROCEDURE — 4010F ACE/ARB THERAPY RXD/TAKEN: CPT | Mod: CPTII,S$GLB,, | Performed by: INTERNAL MEDICINE

## 2021-12-01 ENCOUNTER — PATIENT MESSAGE (OUTPATIENT)
Dept: HEPATOLOGY | Facility: CLINIC | Age: 76
End: 2021-12-01
Payer: MEDICARE

## 2021-12-09 RX ORDER — CARVEDILOL 6.25 MG/1
6.25 TABLET ORAL 2 TIMES DAILY WITH MEALS
Qty: 60 TABLET | Refills: 5 | Status: SHIPPED | OUTPATIENT
Start: 2021-12-09 | End: 2022-03-14

## 2021-12-13 ENCOUNTER — TELEPHONE (OUTPATIENT)
Dept: HEPATOLOGY | Facility: CLINIC | Age: 76
End: 2021-12-13
Payer: MEDICARE

## 2021-12-14 DIAGNOSIS — E03.9 ACQUIRED HYPOTHYROIDISM: Chronic | ICD-10-CM

## 2021-12-17 RX ORDER — LEVOTHYROXINE SODIUM 112 UG/1
TABLET ORAL
Qty: 180 TABLET | Refills: 2 | Status: SHIPPED | OUTPATIENT
Start: 2021-12-17 | End: 2021-12-27

## 2021-12-20 ENCOUNTER — LAB VISIT (OUTPATIENT)
Dept: LAB | Facility: HOSPITAL | Age: 76
End: 2021-12-20
Attending: PEDIATRICS
Payer: MEDICARE

## 2021-12-20 DIAGNOSIS — E11.22 TYPE 2 DIABETES MELLITUS WITH CHRONIC KIDNEY DISEASE, WITH LONG-TERM CURRENT USE OF INSULIN, UNSPECIFIED CKD STAGE: Chronic | ICD-10-CM

## 2021-12-20 DIAGNOSIS — Z79.4 TYPE 2 DIABETES MELLITUS WITH CHRONIC KIDNEY DISEASE, WITH LONG-TERM CURRENT USE OF INSULIN, UNSPECIFIED CKD STAGE: Chronic | ICD-10-CM

## 2021-12-20 DIAGNOSIS — E03.9 ACQUIRED HYPOTHYROIDISM: Chronic | ICD-10-CM

## 2021-12-20 LAB
ALBUMIN SERPL BCP-MCNC: 3.5 G/DL (ref 3.5–5.2)
ALP SERPL-CCNC: 66 U/L (ref 55–135)
ALT SERPL W/O P-5'-P-CCNC: 24 U/L (ref 10–44)
ANION GAP SERPL CALC-SCNC: 8 MMOL/L (ref 8–16)
AST SERPL-CCNC: 22 U/L (ref 10–40)
BILIRUB SERPL-MCNC: 0.6 MG/DL (ref 0.1–1)
BUN SERPL-MCNC: 16 MG/DL (ref 8–23)
CALCIUM SERPL-MCNC: 9.7 MG/DL (ref 8.7–10.5)
CHLORIDE SERPL-SCNC: 102 MMOL/L (ref 95–110)
CHOLEST SERPL-MCNC: 181 MG/DL (ref 120–199)
CHOLEST/HDLC SERPL: 3.6 {RATIO} (ref 2–5)
CO2 SERPL-SCNC: 27 MMOL/L (ref 23–29)
CREAT SERPL-MCNC: 1.1 MG/DL (ref 0.5–1.4)
EST. GFR  (AFRICAN AMERICAN): >60 ML/MIN/1.73 M^2
EST. GFR  (NON AFRICAN AMERICAN): >60 ML/MIN/1.73 M^2
ESTIMATED AVG GLUCOSE: 157 MG/DL (ref 68–131)
GLUCOSE SERPL-MCNC: 138 MG/DL (ref 70–110)
HBA1C MFR BLD: 7.1 % (ref 4–5.6)
HDLC SERPL-MCNC: 50 MG/DL (ref 40–75)
HDLC SERPL: 27.6 % (ref 20–50)
LDLC SERPL CALC-MCNC: 100.8 MG/DL (ref 63–159)
NONHDLC SERPL-MCNC: 131 MG/DL
POTASSIUM SERPL-SCNC: 4.2 MMOL/L (ref 3.5–5.1)
PROT SERPL-MCNC: 7.5 G/DL (ref 6–8.4)
SODIUM SERPL-SCNC: 137 MMOL/L (ref 136–145)
T4 FREE SERPL-MCNC: 1.25 NG/DL (ref 0.71–1.51)
TRIGL SERPL-MCNC: 151 MG/DL (ref 30–150)
TSH SERPL DL<=0.005 MIU/L-ACNC: 0.15 UIU/ML (ref 0.4–4)

## 2021-12-20 PROCEDURE — 83036 HEMOGLOBIN GLYCOSYLATED A1C: CPT | Performed by: PEDIATRICS

## 2021-12-20 PROCEDURE — 80053 COMPREHEN METABOLIC PANEL: CPT | Performed by: PEDIATRICS

## 2021-12-20 PROCEDURE — 84439 ASSAY OF FREE THYROXINE: CPT | Performed by: PEDIATRICS

## 2021-12-20 PROCEDURE — 80061 LIPID PANEL: CPT | Performed by: PEDIATRICS

## 2021-12-20 PROCEDURE — 84443 ASSAY THYROID STIM HORMONE: CPT | Performed by: PEDIATRICS

## 2021-12-20 PROCEDURE — 36415 COLL VENOUS BLD VENIPUNCTURE: CPT | Mod: PO | Performed by: PEDIATRICS

## 2021-12-23 ENCOUNTER — TELEPHONE (OUTPATIENT)
Dept: RADIOLOGY | Facility: HOSPITAL | Age: 76
End: 2021-12-23
Payer: MEDICARE

## 2021-12-27 ENCOUNTER — HOSPITAL ENCOUNTER (OUTPATIENT)
Dept: RADIOLOGY | Facility: HOSPITAL | Age: 76
Discharge: HOME | End: 2021-12-27
Attending: INTERNAL MEDICINE
Payer: MEDICARE

## 2021-12-27 ENCOUNTER — OFFICE VISIT (OUTPATIENT)
Dept: INTERNAL MEDICINE | Facility: CLINIC | Age: 76
End: 2021-12-27
Payer: MEDICARE

## 2021-12-27 VITALS
HEIGHT: 68 IN | TEMPERATURE: 98 F | DIASTOLIC BLOOD PRESSURE: 72 MMHG | RESPIRATION RATE: 20 BRPM | SYSTOLIC BLOOD PRESSURE: 106 MMHG | OXYGEN SATURATION: 100 % | BODY MASS INDEX: 36.75 KG/M2 | HEART RATE: 70 BPM | WEIGHT: 242.5 LBS

## 2021-12-27 DIAGNOSIS — J45.20 MILD INTERMITTENT ASTHMA WITHOUT COMPLICATION: ICD-10-CM

## 2021-12-27 DIAGNOSIS — I25.10 CORONARY ARTERY DISEASE INVOLVING NATIVE CORONARY ARTERY OF NATIVE HEART WITHOUT ANGINA PECTORIS: Chronic | ICD-10-CM

## 2021-12-27 DIAGNOSIS — Z85.05 HISTORY OF HEPATOCELLULAR CARCINOMA: ICD-10-CM

## 2021-12-27 DIAGNOSIS — N40.1 BENIGN PROSTATIC HYPERPLASIA WITH NOCTURIA: Chronic | ICD-10-CM

## 2021-12-27 DIAGNOSIS — G47.33 OSA ON CPAP: Chronic | ICD-10-CM

## 2021-12-27 DIAGNOSIS — R35.1 BENIGN PROSTATIC HYPERPLASIA WITH NOCTURIA: Chronic | ICD-10-CM

## 2021-12-27 DIAGNOSIS — E11.29 TYPE 2 DIABETES MELLITUS WITH MICROALBUMINURIA, WITH LONG-TERM CURRENT USE OF INSULIN: Primary | Chronic | ICD-10-CM

## 2021-12-27 DIAGNOSIS — I10 ESSENTIAL HYPERTENSION: Chronic | ICD-10-CM

## 2021-12-27 DIAGNOSIS — K74.60 CIRRHOSIS OF LIVER WITHOUT ASCITES, UNSPECIFIED HEPATIC CIRRHOSIS TYPE: ICD-10-CM

## 2021-12-27 DIAGNOSIS — Z12.5 PROSTATE CANCER SCREENING: ICD-10-CM

## 2021-12-27 DIAGNOSIS — I65.23 BILATERAL CAROTID ARTERY STENOSIS: Chronic | ICD-10-CM

## 2021-12-27 DIAGNOSIS — Z78.9 STATIN INTOLERANCE: Chronic | ICD-10-CM

## 2021-12-27 DIAGNOSIS — Z79.4 TYPE 2 DIABETES MELLITUS WITH MICROALBUMINURIA, WITH LONG-TERM CURRENT USE OF INSULIN: Primary | Chronic | ICD-10-CM

## 2021-12-27 DIAGNOSIS — Z79.4 TYPE 2 DIABETES MELLITUS WITH CHRONIC KIDNEY DISEASE, WITH LONG-TERM CURRENT USE OF INSULIN, UNSPECIFIED CKD STAGE: Chronic | ICD-10-CM

## 2021-12-27 DIAGNOSIS — R80.9 TYPE 2 DIABETES MELLITUS WITH MICROALBUMINURIA, WITH LONG-TERM CURRENT USE OF INSULIN: Primary | Chronic | ICD-10-CM

## 2021-12-27 DIAGNOSIS — I70.0 AORTIC ATHEROSCLEROSIS: ICD-10-CM

## 2021-12-27 DIAGNOSIS — E11.22 TYPE 2 DIABETES MELLITUS WITH CHRONIC KIDNEY DISEASE, WITH LONG-TERM CURRENT USE OF INSULIN, UNSPECIFIED CKD STAGE: Chronic | ICD-10-CM

## 2021-12-27 DIAGNOSIS — E03.9 ACQUIRED HYPOTHYROIDISM: Chronic | ICD-10-CM

## 2021-12-27 DIAGNOSIS — E66.01 CLASS 2 SEVERE OBESITY DUE TO EXCESS CALORIES WITH SERIOUS COMORBIDITY AND BODY MASS INDEX (BMI) OF 37.0 TO 37.9 IN ADULT: ICD-10-CM

## 2021-12-27 DIAGNOSIS — C22.0 HCC (HEPATOCELLULAR CARCINOMA): ICD-10-CM

## 2021-12-27 DIAGNOSIS — K21.9 GASTROESOPHAGEAL REFLUX DISEASE, UNSPECIFIED WHETHER ESOPHAGITIS PRESENT: ICD-10-CM

## 2021-12-27 PROBLEM — J42 CHRONIC WHEEZY BRONCHITIS: Status: RESOLVED | Noted: 2018-09-07 | Resolved: 2021-12-27

## 2021-12-27 PROCEDURE — 99214 OFFICE O/P EST MOD 30 MIN: CPT | Mod: S$GLB,,, | Performed by: PEDIATRICS

## 2021-12-27 PROCEDURE — 1126F PR PAIN SEVERITY QUANTIFIED, NO PAIN PRESENT: ICD-10-PCS | Mod: CPTII,S$GLB,, | Performed by: PEDIATRICS

## 2021-12-27 PROCEDURE — 3066F PR DOCUMENTATION OF TREATMENT FOR NEPHROPATHY: ICD-10-PCS | Mod: CPTII,S$GLB,, | Performed by: PEDIATRICS

## 2021-12-27 PROCEDURE — 1157F PR ADVANCE CARE PLAN OR EQUIV PRESENT IN MEDICAL RECORD: ICD-10-PCS | Mod: CPTII,S$GLB,, | Performed by: PEDIATRICS

## 2021-12-27 PROCEDURE — 25500020 PHARM REV CODE 255: Performed by: INTERNAL MEDICINE

## 2021-12-27 PROCEDURE — 1126F AMNT PAIN NOTED NONE PRSNT: CPT | Mod: CPTII,S$GLB,, | Performed by: PEDIATRICS

## 2021-12-27 PROCEDURE — 4010F PR ACE/ARB THEARPY RXD/TAKEN: ICD-10-PCS | Mod: CPTII,S$GLB,, | Performed by: PEDIATRICS

## 2021-12-27 PROCEDURE — 3078F DIAST BP <80 MM HG: CPT | Mod: CPTII,S$GLB,, | Performed by: PEDIATRICS

## 2021-12-27 PROCEDURE — 1101F PR PT FALLS ASSESS DOC 0-1 FALLS W/OUT INJ PAST YR: ICD-10-PCS | Mod: CPTII,S$GLB,, | Performed by: PEDIATRICS

## 2021-12-27 PROCEDURE — 74183 MRI ABD W/O CNTR FLWD CNTR: CPT | Mod: 26,,, | Performed by: RADIOLOGY

## 2021-12-27 PROCEDURE — 3074F PR MOST RECENT SYSTOLIC BLOOD PRESSURE < 130 MM HG: ICD-10-PCS | Mod: CPTII,S$GLB,, | Performed by: PEDIATRICS

## 2021-12-27 PROCEDURE — 99214 PR OFFICE/OUTPT VISIT, EST, LEVL IV, 30-39 MIN: ICD-10-PCS | Mod: S$GLB,,, | Performed by: PEDIATRICS

## 2021-12-27 PROCEDURE — 3074F SYST BP LT 130 MM HG: CPT | Mod: CPTII,S$GLB,, | Performed by: PEDIATRICS

## 2021-12-27 PROCEDURE — 99999 PR PBB SHADOW E&M-EST. PATIENT-LVL V: ICD-10-PCS | Mod: PBBFAC,,, | Performed by: PEDIATRICS

## 2021-12-27 PROCEDURE — 99999 PR PBB SHADOW E&M-EST. PATIENT-LVL V: CPT | Mod: PBBFAC,,, | Performed by: PEDIATRICS

## 2021-12-27 PROCEDURE — 1159F MED LIST DOCD IN RCRD: CPT | Mod: CPTII,S$GLB,, | Performed by: PEDIATRICS

## 2021-12-27 PROCEDURE — 3288F FALL RISK ASSESSMENT DOCD: CPT | Mod: CPTII,S$GLB,, | Performed by: PEDIATRICS

## 2021-12-27 PROCEDURE — 74183 MRI ABDOMEN W WO CONTRAST: ICD-10-PCS | Mod: 26,,, | Performed by: RADIOLOGY

## 2021-12-27 PROCEDURE — 3051F PR MOST RECENT HEMOGLOBIN A1C LEVEL 7.0 - < 8.0%: ICD-10-PCS | Mod: CPTII,S$GLB,, | Performed by: PEDIATRICS

## 2021-12-27 PROCEDURE — 1159F PR MEDICATION LIST DOCUMENTED IN MEDICAL RECORD: ICD-10-PCS | Mod: CPTII,S$GLB,, | Performed by: PEDIATRICS

## 2021-12-27 PROCEDURE — 3078F PR MOST RECENT DIASTOLIC BLOOD PRESSURE < 80 MM HG: ICD-10-PCS | Mod: CPTII,S$GLB,, | Performed by: PEDIATRICS

## 2021-12-27 PROCEDURE — 3051F HG A1C>EQUAL 7.0%<8.0%: CPT | Mod: CPTII,S$GLB,, | Performed by: PEDIATRICS

## 2021-12-27 PROCEDURE — A9585 GADOBUTROL INJECTION: HCPCS | Performed by: INTERNAL MEDICINE

## 2021-12-27 PROCEDURE — 3288F PR FALLS RISK ASSESSMENT DOCUMENTED: ICD-10-PCS | Mod: CPTII,S$GLB,, | Performed by: PEDIATRICS

## 2021-12-27 PROCEDURE — 3061F PR NEG MICROALBUMINURIA RESULT DOCUMENTED/REVIEW: ICD-10-PCS | Mod: CPTII,S$GLB,, | Performed by: PEDIATRICS

## 2021-12-27 PROCEDURE — 4010F ACE/ARB THERAPY RXD/TAKEN: CPT | Mod: CPTII,S$GLB,, | Performed by: PEDIATRICS

## 2021-12-27 PROCEDURE — 74183 MRI ABD W/O CNTR FLWD CNTR: CPT | Mod: TC

## 2021-12-27 PROCEDURE — 3066F NEPHROPATHY DOC TX: CPT | Mod: CPTII,S$GLB,, | Performed by: PEDIATRICS

## 2021-12-27 PROCEDURE — 1157F ADVNC CARE PLAN IN RCRD: CPT | Mod: CPTII,S$GLB,, | Performed by: PEDIATRICS

## 2021-12-27 PROCEDURE — 1160F RVW MEDS BY RX/DR IN RCRD: CPT | Mod: CPTII,S$GLB,, | Performed by: PEDIATRICS

## 2021-12-27 PROCEDURE — 3061F NEG MICROALBUMINURIA REV: CPT | Mod: CPTII,S$GLB,, | Performed by: PEDIATRICS

## 2021-12-27 PROCEDURE — 1160F PR REVIEW ALL MEDS BY PRESCRIBER/CLIN PHARMACIST DOCUMENTED: ICD-10-PCS | Mod: CPTII,S$GLB,, | Performed by: PEDIATRICS

## 2021-12-27 PROCEDURE — 1101F PT FALLS ASSESS-DOCD LE1/YR: CPT | Mod: CPTII,S$GLB,, | Performed by: PEDIATRICS

## 2021-12-27 RX ORDER — LEVOTHYROXINE SODIUM 100 UG/1
100 TABLET ORAL
Qty: 90 TABLET | Refills: 3 | Status: SHIPPED | OUTPATIENT
Start: 2021-12-27 | End: 2022-06-29

## 2021-12-27 RX ORDER — GADOBUTROL 604.72 MG/ML
10 INJECTION INTRAVENOUS
Status: COMPLETED | OUTPATIENT
Start: 2021-12-27 | End: 2021-12-27

## 2021-12-27 RX ORDER — EMPAGLIFLOZIN 25 MG/1
25 TABLET, FILM COATED ORAL DAILY
Qty: 90 TABLET | Refills: 3 | Status: SHIPPED | OUTPATIENT
Start: 2021-12-27 | End: 2022-12-14

## 2021-12-27 RX ADMIN — GADOBUTROL 10 ML: 604.72 INJECTION INTRAVENOUS at 11:12

## 2021-12-27 NOTE — H&P (VIEW-ONLY)
Subjective:       Patient ID: Erendira Pretty is a 76 y.o. male.    Chief Complaint: Follow-up and Results    Erendira Pretty is a 76 y.o. male who presents to clinic for a 3mo follow up    1) DM: no hyper/hypoglycemic symptoms. Infrequent checks at home, taking Jardiance 10mg, 44u Lantus, and Metformin BID.   2) HTN: rare BP checks, B/P good, no HTNive symptoms. Switched from Metoprolol to Coreg for esophogeal varicies  3) LIPIDS: following D&E, statin intolerant. takes Zetia and Fenofibrate  4) CAD/Atherosclerosis: no CP, active and sees Dr. Gallardo  5) Hypothyroid: asymptomatic, no swallowing difficulties, no hyper/hypothyroid symptoms. Compliant with levothyroxine 112mcg.  6) Hepatocellular carcinoma: followed by Dr. Tomas  7) Ashtma/DELONTE/obseity: followed by Pulmonary    LABS REVIEWED AND DISCUSSED WITH PATIENT: TSH, A1C, CMP, lipid panel, and microalbumin/creatinine    Review of Systems   Constitutional: Negative for activity change, appetite change, chills, diaphoresis, fatigue, fever and unexpected weight change.   HENT: Negative for nasal congestion, ear pain, mouth sores, nosebleeds, postnasal drip, rhinorrhea, sneezing and sore throat.    Eyes: Negative for photophobia, pain, discharge, redness and visual disturbance.   Respiratory: Negative for cough, chest tightness, shortness of breath, wheezing and stridor.    Cardiovascular: Negative for chest pain, palpitations and leg swelling.   Gastrointestinal: Negative for abdominal distention, blood in stool, constipation, diarrhea, nausea and vomiting.   Genitourinary: Negative for decreased urine volume, difficulty urinating, dysuria, flank pain, frequency, genital sores, hematuria and urgency.   Musculoskeletal: Negative for arthralgias, back pain, joint swelling, neck pain and neck stiffness.   Integumentary:  Negative for color change, pallor, rash and wound.   Neurological: Negative for dizziness, syncope, speech difficulty, weakness, light-headedness and  headaches.   Hematological: Negative for adenopathy. Does not bruise/bleed easily.   Psychiatric/Behavioral: Negative for confusion, decreased concentration, dysphoric mood, hallucinations, sleep disturbance and suicidal ideas. The patient is not nervous/anxious.    All other systems reviewed and are negative.        Objective:      Physical Exam  Vitals and nursing note reviewed.   Constitutional:       General: He is not in acute distress.     Appearance: He is well-developed.   Neck:      Thyroid: No thyromegaly.      Vascular: No JVD.   Cardiovascular:      Rate and Rhythm: Normal rate and regular rhythm.      Heart sounds: Normal heart sounds. No murmur heard.      Pulmonary:      Effort: Pulmonary effort is normal. No respiratory distress.      Breath sounds: Normal breath sounds. No wheezing or rales.   Abdominal:      General: There is no distension.      Palpations: Abdomen is soft. There is no mass.      Tenderness: There is no abdominal tenderness. There is no guarding.   Musculoskeletal:      Right lower leg: No edema.      Left lower leg: No edema.   Lymphadenopathy:      Cervical: No cervical adenopathy.   Skin:     Capillary Refill: Capillary refill takes less than 2 seconds.      Findings: No rash.   Neurological:      General: No focal deficit present.      Mental Status: He is alert and oriented to person, place, and time.      Cranial Nerves: No cranial nerve deficit.      Coordination: Coordination normal.   Psychiatric:         Mood and Affect: Mood normal.         Behavior: Behavior normal.         Thought Content: Thought content normal.         Judgment: Judgment normal.         Assessment:       Problem List Items Addressed This Visit     Type 2 diabetes mellitus with kidney complication, with long-term current use of insulin (Chronic)    Relevant Medications    empagliflozin (JARDIANCE) 25 mg tablet    Other Relevant Orders    Hemoglobin A1C    Microalbumin/Creatinine Ratio, Urine    Basic  Metabolic Panel    Essential hypertension (Chronic)    Acquired hypothyroidism (Chronic)    Relevant Medications    levothyroxine (SYNTHROID) 100 MCG tablet    Other Relevant Orders    TSH    Coronary artery disease involving native coronary artery of native heart without angina pectoris (Chronic)    Benign prostatic hyperplasia with nocturia (Chronic)    DELONTE on CPAP (Chronic)    Type 2 diabetes mellitus with microalbuminuria, with long-term current use of insulin - Primary (Chronic)    Bilateral carotid artery stenosis (Chronic)    Statin intolerance (Chronic)    Class 2 severe obesity due to excess calories with serious comorbidity and body mass index (BMI) of 37.0 to 37.9 in adult    Hepatic cirrhosis    Mild intermittent asthma without complication    GERD (gastroesophageal reflux disease)    HCC (hepatocellular carcinoma)    Aortic atherosclerosis      Other Visit Diagnoses     Prostate cancer screening        Relevant Orders    PSA, Screening          Plan:     Type 2 diabetes mellitus with microalbuminuria, with long-term current use of insulin    Type 2 diabetes mellitus with chronic kidney disease, with long-term current use of insulin, unspecified CKD stage  -     empagliflozin (JARDIANCE) 25 mg tablet; Take 1 tablet (25 mg total) by mouth once daily.  Dispense: 90 tablet; Refill: 3  -     Hemoglobin A1C; Future; Expected date: 12/27/2021  -     Microalbumin/Creatinine Ratio, Urine; Future; Expected date: 12/27/2021  -     Basic Metabolic Panel; Future; Expected date: 08/27/2022    Acquired hypothyroidism  -     TSH; Future; Expected date: 12/27/2021  -     levothyroxine (SYNTHROID) 100 MCG tablet; Take 1 tablet (100 mcg total) by mouth before breakfast.  Dispense: 90 tablet; Refill: 3    Statin intolerance    Aortic atherosclerosis    Benign prostatic hyperplasia with nocturia    Bilateral carotid artery stenosis    Class 2 severe obesity due to excess calories with serious comorbidity and body mass index  (BMI) of 37.0 to 37.9 in adult    Coronary artery disease involving native coronary artery of native heart without angina pectoris    Essential hypertension    Gastroesophageal reflux disease, unspecified whether esophagitis present    HCC (hepatocellular carcinoma)    Cirrhosis of liver without ascites, unspecified hepatic cirrhosis type    Mild intermittent asthma without complication    DELONTE on CPAP    Prostate cancer screening  -     PSA, Screening; Future; Expected date: 08/27/2022    He has made improvement in A1c, lipids, microalbinuria and B/P. Weight down 2#s. Increase Jardiance to 25, drop levothyroxine to 100 mcg. Maintain other meds, D&E, weight loss, self monitoring. F/U 6 months with labs.  Scribe Attestation:   I, Neo Coleman, am scribing for, and in the presence of, Dr. Bhupinder Callaway Jr. I performed the above scribed service and the documentation accurately describes the services I performed. I attest to the accuracy of the note.    I, Dr. Bhupinder Callaway Jr, reviewed documentation as scribed above. I personally performed the services described in this documentation.  I agree that the record reflects my personal performance and is accurate and complete. Bhupinder Callaway Jr., MD.  12/27/2021

## 2021-12-30 ENCOUNTER — TELEPHONE (OUTPATIENT)
Dept: HEPATOLOGY | Facility: CLINIC | Age: 76
End: 2021-12-30
Payer: MEDICARE

## 2021-12-30 DIAGNOSIS — C22.0 HCC (HEPATOCELLULAR CARCINOMA): Primary | ICD-10-CM

## 2022-01-01 NOTE — PROGRESS NOTES
Subjective:   Patient ID:  Erendira Pretty is a 72 y.o. male who presents for follow-up of stress test which is negative.  HCTZ has improved edema. Patient denies CP, angina or anginal equivalent.  Hypertension   This is a chronic problem. The current episode started more than 1 year ago. The problem has been gradually improving since onset. The problem is controlled. Pertinent negatives include no chest pain, palpitations or shortness of breath. Past treatments include beta blockers, diuretics, angiotensin blockers and calcium channel blockers. The current treatment provides moderate improvement. Compliance problems include exercise.    Hyperlipidemia   This is a chronic problem. The current episode started more than 1 year ago. The problem is controlled. Recent lipid tests were reviewed and are variable. Pertinent negatives include no chest pain or shortness of breath. Current antihyperlipidemic treatment includes statins and nicotinic acid. The current treatment provides moderate improvement of lipids. Compliance problems include adherence to exercise.        Review of Systems   Constitution: Negative. Negative for weight gain.   HENT: Negative.    Eyes: Negative.    Cardiovascular: Negative.  Negative for chest pain, leg swelling and palpitations.   Respiratory: Negative.  Negative for shortness of breath.    Endocrine: Negative.    Hematologic/Lymphatic: Negative.    Skin: Negative.    Musculoskeletal: Negative for muscle weakness.   Gastrointestinal: Negative.    Genitourinary: Negative.    Neurological: Negative.  Negative for dizziness.   Psychiatric/Behavioral: Negative.    Allergic/Immunologic: Negative.      Family History   Problem Relation Age of Onset    Heart disease Mother     Heart disease Father     Heart disease Brother     Colon cancer Neg Hx      Past Medical History:   Diagnosis Date    Asthma     BPH (benign prostatic hyperplasia)     CAD (coronary artery disease)     40% lesion  2002;[facility]    Cirrhosis     Claudication 4/9/2014    Colon polyp     ED (erectile dysfunction)     Ex-smoker     Hearing loss NEC     Hepatitis C     Cured;dr gibbs, harvoni 2015    Hyperlipidemia     Hypertension     Hypothyroid     s/p tx graves    Osteoarthritis     PVD (peripheral vascular disease)     Sleep apnea     Type 2 diabetes mellitus      Current Outpatient Prescriptions on File Prior to Visit   Medication Sig Dispense Refill    ACIPHEX 20 mg tablet TAKE 1 TABLET BY MOUTH TWO TIMES A DAY 60 tablet 11    albuterol 90 mcg/actuation inhaler Inhale 2 puffs into the lungs every 6 (six) hours. 1 Inhaler 12    aspirin (ECOTRIN) 81 MG EC tablet Take 81 mg by mouth once daily.      blood sugar diagnostic (BLOOD GLUCOSE TEST) Strp 1 strip by Misc.(Non-Drug; Combo Route) route 3 (three) times daily. accu-chek lux plus 100 strip 11    cycloSPORINE (RESTASIS) 0.05 % ophthalmic emulsion Place 0.4 mLs (1 drop total) into both eyes 2 (two) times daily. 60 each 11    ezetimibe (ZETIA) 10 mg tablet Take 1 tablet (10 mg total) by mouth once daily. 90 tablet 3    GLUCOSAMINE HCL/CHONDR ROSARIO A NA (OSTEO BI-FLEX ORAL) Take 1 tablet by mouth once daily.      HUMALOG KWIKPEN 100 unit/mL InPn pen INJECT 3-4 UNITS INTO THE SKIN THREE TIMES DAILY BEFORE MEALS. 3 mL 1    hydrochlorothiazide (HYDRODIURIL) 12.5 MG Tab Take 1 tablet (12.5 mg total) by mouth once daily. 30 tablet 2    insulin glargine (LANTUS SOLOSTAR) 100 unit/mL (3 mL) InPn pen Inject 40 Units into the skin once daily. 1 Box 11    lancets (ACCU-CHEK FASTCLIX) Misc Test 2-3 times daily 200 each 3    levocetirizine (XYZAL) 5 MG tablet Take 1 tablet (5 mg total) by mouth every evening. 30 tablet 11    metoprolol succinate (TOPROL-XL) 50 MG 24 hr tablet TAKE ONE BY MOUTH TWO TIMES A DAY. 60 tablet 11    mometasone (NASONEX) 50 mcg/actuation nasal spray 2 sprays by Nasal route once daily. 17 g 12    MV,CA,IRON,MIN/FA/PHYTOSTEROL (CENTRUM  "SPECIALIST HEART ORAL) Take 1 tablet by mouth 2 (two) times daily.      niacin, inositol niacinate, (NIACIN FLUSH FREE) 400 mg niacin (500 mg) Cap Take 500 mg by mouth every evening.  0    OMEGA-3 FATTY ACIDS/DHA/EPA (MEGARED PLANT-OMEGA-3 ORAL) Take 1 capsule by mouth once daily.      pen needle, diabetic (BD INSULIN PEN NEEDLE UF MINI) 31 gauge x 3/16" Ndle USE AS DIRECTED 100 each 11    psyllium (METAMUCIL) 0.52 gram capsule Take by mouth.      RESTASIS 0.05 % ophthalmic emulsion PLACE 1 DROP IN BOTH EYES TWO TIMES A DAY 60 each 0    saw palmetto 80 MG capsule Take 80 mg by mouth 2 (two) times daily.      sildenafil (REVATIO) 20 mg Tab Take 2 tablets one hour prior to intercourse. Max two per 24 hours 90 tablet 11    SYNTHROID 137 mcg Tab tablet Take 2 tablets (274 mcg total) by mouth before breakfast. 60 tablet 5    TWYNSTA 80-10 mg Tab TAKE ONE BY MOUTH EVERY DAY. 30 tablet 11    [DISCONTINUED] saw palmetto 160 MG capsule Take 80 mg by mouth.       Current Facility-Administered Medications on File Prior to Visit   Medication Dose Route Frequency Provider Last Rate Last Dose    dexamethasone injection 8 mg  8 mg Intramuscular Once Alejandro Parkinson MD         Review of patient's allergies indicates:   Allergen Reactions    Iodinated contrast- oral and iv dye     Omeprazole     Prilosec [omeprazole magnesium]        Objective:     Physical Exam   Constitutional: He is oriented to person, place, and time. He appears well-developed and well-nourished.   HENT:   Head: Normocephalic and atraumatic.   Eyes: Conjunctivae are normal. Pupils are equal, round, and reactive to light.   Neck: Normal range of motion. Neck supple.   Cardiovascular: Normal rate, regular rhythm, normal heart sounds and intact distal pulses.    Pulmonary/Chest: Effort normal and breath sounds normal.   Abdominal: Soft. Bowel sounds are normal.   Neurological: He is alert and oriented to person, place, and time.   Skin: Skin is warm and " dry.   Psychiatric: He has a normal mood and affect.   Nursing note and vitals reviewed.      Assessment:     1. Type 2 diabetes mellitus without complication, with long-term current use of insulin    2. Essential hypertension    3. Obstructive sleep apnea syndrome    4. PVC (premature ventricular contraction)    5. Coronary artery disease involving native coronary artery of native heart without angina pectoris    6. Other fatigue        Plan:     Type 2 diabetes mellitus without complication, with long-term current use of insulin    Essential hypertension    Obstructive sleep apnea syndrome    PVC (premature ventricular contraction)    Coronary artery disease involving native coronary artery of native heart without angina pectoris    Other fatigue      Change twynsta to exforge because of the price  Continue metoprolol, diuretics-htn  Continue niacin, zetia-hlp     normal (ped)...

## 2022-01-04 DIAGNOSIS — J32.4 CHRONIC PANSINUSITIS: ICD-10-CM

## 2022-01-04 RX ORDER — MONTELUKAST SODIUM 10 MG/1
10 TABLET ORAL DAILY
Qty: 30 TABLET | Refills: 11 | Status: SHIPPED | OUTPATIENT
Start: 2022-01-04 | End: 2022-12-13

## 2022-01-04 NOTE — TELEPHONE ENCOUNTER
----- Message from Meg Nixon MA sent at 1/4/2022  4:06 PM CST -----  Contact: Pt  Rx from ENT, request sent to ENT provider ADITYA Davies's staff for f/u.     ----- Message -----  From: Nando Dejesus  Sent: 1/4/2022   3:03 PM CST  To: Leora Moon Jr Staff    Type:  RX Refill Request    Who Called: pt  Refill or New Rx: refill  RX Name and Strength:montlukast sod 10 mg   How is the patient currently taking it? (ex. 1XDay):once daily   Is this a 30 day or 90 day RX: 30 days  Preferred Pharmacy with phone number: [facility]   Local or Mail Order: local   Ordering Provider:leora   Would the patient rather a call back or a response via [facility]? Phone   Best Call Back Number:  [phone]  Additional Information:        [facility] - [place], [place] - [address] [facility] [address]  [address] [address] [address]  Phone: [phone] Fax: [phone]

## 2022-01-05 ENCOUNTER — TELEPHONE (OUTPATIENT)
Dept: HEPATOLOGY | Facility: CLINIC | Age: 77
End: 2022-01-05
Payer: MEDICARE

## 2022-01-05 NOTE — TELEPHONE ENCOUNTER
----- Message from Anna Tomas MD sent at 12/9/2021  9:16 AM CST -----  Dr. Gallardo said it is ok to switch from metoprolol to carvedilol. Please have patient stop the metoprolol and start carvedilol 6.25mg twice a day. He will need to monitor his BP every for a week with the switch to make sure his blood pressure is still well controlled. If he does not have a BP cuff please have him come in for a nurse visit for BP check after a week on the new medication.

## 2022-01-14 DIAGNOSIS — R80.9 TYPE 2 DIABETES MELLITUS WITH MICROALBUMINURIA, WITH LONG-TERM CURRENT USE OF INSULIN: Chronic | ICD-10-CM

## 2022-01-14 DIAGNOSIS — E11.29 TYPE 2 DIABETES MELLITUS WITH MICROALBUMINURIA, WITH LONG-TERM CURRENT USE OF INSULIN: Chronic | ICD-10-CM

## 2022-01-14 DIAGNOSIS — Z79.4 TYPE 2 DIABETES MELLITUS WITH MICROALBUMINURIA, WITH LONG-TERM CURRENT USE OF INSULIN: Chronic | ICD-10-CM

## 2022-01-14 NOTE — TELEPHONE ENCOUNTER
----- Message from Omaira Herndon sent at 1/14/2022  2:55 PM CST -----  .Type:  RX Refill Request      Refill or New Rx: metFORMIN HCL (GLUCOPHAGE) 500 MG tablet  RX Name and Strength:  How is the patient currently taking it? (ex. 1XDay): twice daily   Is this a 30 day or 90 day RX: 90    Preferred Pharmacy with phone number:   [place], [place] - [address] [address] [address]  [address] [address] [address]  Phone: [phone] Fax: [phone]

## 2022-01-14 NOTE — TELEPHONE ENCOUNTER
No new care gaps identified.  Powered by [employer]. Reference number: [id number].   1/14/2022 2:52:40 PM CST

## 2022-01-18 RX ORDER — METFORMIN HYDROCHLORIDE 500 MG/1
TABLET ORAL
Qty: 180 TABLET | Refills: 3 | Status: SHIPPED | OUTPATIENT
Start: 2022-01-18 | End: 2022-12-27

## 2022-01-24 DIAGNOSIS — I10 ESSENTIAL HYPERTENSION: ICD-10-CM

## 2022-01-24 DIAGNOSIS — E78.2 MIXED HYPERLIPIDEMIA: ICD-10-CM

## 2022-01-24 DIAGNOSIS — D68.9 COAGULATION DEFECT, UNSPECIFIED: Primary | ICD-10-CM

## 2022-01-25 ENCOUNTER — HOSPITAL ENCOUNTER (OUTPATIENT)
Dept: RADIOLOGY | Facility: HOSPITAL | Age: 77
Discharge: HOME | End: 2022-01-25
Attending: INTERNAL MEDICINE | Admitting: RADIOLOGY
Payer: MEDICARE

## 2022-01-25 ENCOUNTER — ANESTHESIA (OUTPATIENT)
Dept: ANESTHESIOLOGY | Facility: HOSPITAL | Age: 77
End: 2022-01-25
Payer: MEDICARE

## 2022-01-25 ENCOUNTER — ANESTHESIA EVENT (OUTPATIENT)
Dept: ANESTHESIOLOGY | Facility: HOSPITAL | Age: 77
End: 2022-01-25
Payer: MEDICARE

## 2022-01-25 ENCOUNTER — HOSPITAL ENCOUNTER (OUTPATIENT)
Facility: HOSPITAL | Age: 77
Discharge: HOME | End: 2022-01-25
Attending: RADIOLOGY | Admitting: RADIOLOGY
Payer: MEDICARE

## 2022-01-25 DIAGNOSIS — C22.0 HCC (HEPATOCELLULAR CARCINOMA): ICD-10-CM

## 2022-01-25 LAB
CTP QC/QA: YES
POCT GLUCOSE: 136 MG/DL (ref 70–110)
SARS-COV-2 AG RESP QL IA.RAPID: NEGATIVE

## 2022-01-25 PROCEDURE — 25000003 PHARM REV CODE 250: Performed by: NURSE ANESTHETIST, CERTIFIED REGISTERED

## 2022-01-25 PROCEDURE — 47382 PERCUT ABLATE LIVER RF: CPT

## 2022-01-25 PROCEDURE — 37000008 HC ANESTHESIA 1ST 15 MINUTES: Performed by: RADIOLOGY

## 2022-01-25 PROCEDURE — 63600175 PHARM REV CODE 636 W HCPCS: Performed by: NURSE ANESTHETIST, CERTIFIED REGISTERED

## 2022-01-25 PROCEDURE — 71000033 HC RECOVERY, INTIAL HOUR: Performed by: RADIOLOGY

## 2022-01-25 PROCEDURE — 71000039 HC RECOVERY, EACH ADD'L HOUR: Performed by: RADIOLOGY

## 2022-01-25 PROCEDURE — 37000009 HC ANESTHESIA EA ADD 15 MINS: Performed by: RADIOLOGY

## 2022-01-25 RX ORDER — LIDOCAINE HYDROCHLORIDE 20 MG/ML
INJECTION INTRAVENOUS
Status: DISCONTINUED | OUTPATIENT
Start: 2022-01-25 | End: 2022-01-25

## 2022-01-25 RX ORDER — KETOROLAC TROMETHAMINE 30 MG/ML
INJECTION, SOLUTION INTRAMUSCULAR; INTRAVENOUS
Status: DISCONTINUED | OUTPATIENT
Start: 2022-01-25 | End: 2022-01-25

## 2022-01-25 RX ORDER — PROPOFOL 10 MG/ML
VIAL (ML) INTRAVENOUS
Status: DISCONTINUED | OUTPATIENT
Start: 2022-01-25 | End: 2022-01-25

## 2022-01-25 RX ORDER — SUCCINYLCHOLINE CHLORIDE 20 MG/ML
INJECTION INTRAMUSCULAR; INTRAVENOUS
Status: DISCONTINUED | OUTPATIENT
Start: 2022-01-25 | End: 2022-01-25

## 2022-01-25 RX ORDER — DEXAMETHASONE SODIUM PHOSPHATE 4 MG/ML
INJECTION, SOLUTION INTRA-ARTICULAR; INTRALESIONAL; INTRAMUSCULAR; INTRAVENOUS; SOFT TISSUE
Status: DISCONTINUED | OUTPATIENT
Start: 2022-01-25 | End: 2022-01-25

## 2022-01-25 RX ORDER — FENTANYL CITRATE 50 UG/ML
INJECTION, SOLUTION INTRAMUSCULAR; INTRAVENOUS
Status: DISCONTINUED | OUTPATIENT
Start: 2022-01-25 | End: 2022-01-25

## 2022-01-25 RX ORDER — ROCURONIUM BROMIDE 10 MG/ML
INJECTION, SOLUTION INTRAVENOUS
Status: DISCONTINUED | OUTPATIENT
Start: 2022-01-25 | End: 2022-01-25

## 2022-01-25 RX ORDER — PHENYLEPHRINE HYDROCHLORIDE 10 MG/ML
INJECTION INTRAVENOUS
Status: DISCONTINUED | OUTPATIENT
Start: 2022-01-25 | End: 2022-01-25

## 2022-01-25 RX ORDER — CEFAZOLIN SODIUM 1 G/3ML
INJECTION, POWDER, FOR SOLUTION INTRAMUSCULAR; INTRAVENOUS
Status: DISCONTINUED | OUTPATIENT
Start: 2022-01-25 | End: 2022-01-25

## 2022-01-25 RX ORDER — ONDANSETRON 2 MG/ML
INJECTION INTRAMUSCULAR; INTRAVENOUS
Status: DISCONTINUED | OUTPATIENT
Start: 2022-01-25 | End: 2022-01-25

## 2022-01-25 RX ADMIN — DEXAMETHASONE SODIUM PHOSPHATE 8 MG: 4 INJECTION, SOLUTION INTRAMUSCULAR; INTRAVENOUS at 11:01

## 2022-01-25 RX ADMIN — KETOROLAC TROMETHAMINE 30 MG: 30 INJECTION, SOLUTION INTRAMUSCULAR; INTRAVENOUS at 11:01

## 2022-01-25 RX ADMIN — PHENYLEPHRINE HYDROCHLORIDE 200 MCG: 10 INJECTION INTRAVENOUS at 11:01

## 2022-01-25 RX ADMIN — SODIUM CHLORIDE: 0.9 INJECTION, SOLUTION INTRAVENOUS at 11:01

## 2022-01-25 RX ADMIN — ONDANSETRON 4 MG: 2 INJECTION, SOLUTION INTRAMUSCULAR; INTRAVENOUS at 11:01

## 2022-01-25 RX ADMIN — CEFAZOLIN 2 G: 1 INJECTION, POWDER, FOR SOLUTION INTRAMUSCULAR; INTRAVENOUS at 11:01

## 2022-01-25 RX ADMIN — PROPOFOL 150 MG: 10 INJECTION, EMULSION INTRAVENOUS at 10:01

## 2022-01-25 RX ADMIN — FENTANYL CITRATE 100 MCG: 50 INJECTION, SOLUTION INTRAMUSCULAR; INTRAVENOUS at 10:01

## 2022-01-25 RX ADMIN — SODIUM CHLORIDE: 0.9 INJECTION, SOLUTION INTRAVENOUS at 10:01

## 2022-01-25 RX ADMIN — GLYCOPYRROLATE 0.4 MG: 0.2 INJECTION, SOLUTION INTRAMUSCULAR; INTRAVENOUS at 11:01

## 2022-01-25 RX ADMIN — SUCCINYLCHOLINE CHLORIDE 140 MG: 20 INJECTION, SOLUTION INTRAMUSCULAR; INTRAVENOUS at 10:01

## 2022-01-25 RX ADMIN — LIDOCAINE HYDROCHLORIDE 100 MG: 20 INJECTION, SOLUTION INTRAVENOUS at 10:01

## 2022-01-25 RX ADMIN — ROCURONIUM BROMIDE 5 MG: 10 INJECTION, SOLUTION INTRAVENOUS at 10:01

## 2022-01-25 NOTE — ANESTHESIA PREPROCEDURE EVALUATION
01/25/2022  Erendira Pretty is a 76 y.o., male.    Anesthesia Evaluation    I have reviewed the Patient Summary Reports.    I have reviewed the Nursing Notes. I have reviewed the NPO Status.   I have reviewed the Medications.     Review of Systems  Anesthesia Hx:  No problems with previous Anesthesia  Denies Family Hx of Anesthesia complications.   Denies Personal Hx of Anesthesia complications.   Social:  Former Smoker, Alcohol Use    Hematology/Oncology:         -- Anemia: Oncology Comments: HCC (hepatocellular carcinoma    EENT/Dental:   Glaucoma  Hearing loss   Cardiovascular:   Hypertension Valvular problems/Murmurs, AS Denies MI. CAD   CABG/stent Dysrhythmias atrial fibrillation Angina     PVD hyperlipidemia ECG has been reviewed. ekg 3/2020:  Sinus rhythm with 1st degree A-V block with Premature atrial complexes 75  Minimal voltage criteria for LVH, may be normal variant   Cannot rule out Anterior infarct ,age undetermined   Abnormal ECG   When compared with ECG of 02-MAR-2020 05:12,   No significant change was found    Echo 12/2018:  1 - Concentric hypertrophy.     2 - No wall motion abnormalities.     3 - Normal left ventricular systolic function (EF 60-65%).     4 - Indeterminate LV diastolic function.     5 - Normal right ventricular systolic function .     6 - The estimated PA systolic pressure is greater than 33 mmHg.     7 - Mild tricuspid regurgitation.   Poor imaging quality    S/P CABG x 2   Pulmonary:   COPD Asthma Sleep Apnea Asbestos exposure   Renal/:   Chronic Renal Disease, CRI BPH    Hepatic/GI:   GERD Liver Disease, Hepatitis, C Cirrhosis  Secondary esophageal varices without bleeding   Musculoskeletal:   Arthritis     Neurological:   Denies CVA. Denies Seizures.    Endocrine:   Diabetes, type 2 Hypothyroidism        Physical Exam  General:  Obesity    Airway/Jaw/Neck:  Airway  Findings: Mouth Opening: Normal Tongue: Normal  General Airway Assessment: Adult  Mallampati: II      Dental:  Dental Findings: In tact   Chest/Lungs:  Chest/Lungs Findings: Clear to auscultation, Normal Respiratory Rate     Heart/Vascular:  Heart Findings: Rate: Normal  Rhythm: Regular Rhythm             Anesthesia Plan  Type of Anesthesia, risks & benefits discussed:  Anesthesia Type:  general    Patient's Preference:   Plan Factors:          Intra-op Monitoring Plan: standard ASA monitors  Intra-op Monitoring Plan Comments:   Post Op Pain Control Plan: multimodal analgesia  Post Op Pain Control Plan Comments:     Induction:   IV  Beta Blocker:  Patient is on a Beta-Blocker and has received one dose within the past 24 hours (No further documentation required).       Informed Consent: Patient understands risks and agrees with Anesthesia plan.  Questions answered. Anesthesia consent signed with patient.  ASA Score: 2     Day of Surgery Review of History & Physical: I have interviewed and examined the patient. I have reviewed the patient's H&P dated:  There are no significant changes.  H&P update referred to the surgeon.         Ready For Surgery From Anesthesia Perspective.

## 2022-01-25 NOTE — TRANSFER OF CARE
Anesthesia Transfer of Care Note    Patient: Erendira Pretty    Procedure(s) Performed: Procedure(s) (LRB):  Liver Microwave Ablation (N/A)    Patient location: PACU    Anesthesia Type: general    Transport from OR: Transported from OR on room air with adequate spontaneous ventilation    Post pain: adequate analgesia    Post assessment: no apparent anesthetic complications and tolerated procedure well    Post vital signs: stable    Level of consciousness: awake    Nausea/Vomiting: no nausea/vomiting    Complications: none    Transfer of care protocol was followed      Last vitals: There were no vitals taken for this visit.

## 2022-01-25 NOTE — ANESTHESIA PROCEDURE NOTES
Intubation    Date/Time: 1/25/2022 10:56 AM  Performed by: Jose Blue CRNA  Authorized by: Horacio Strong II, MD     Intubation:     Induction:  Intravenous    Intubated:  Postinduction    Mask Ventilation:  Easy with oral airway    Attempts:  1    Attempted By:  CRNA    Method of Intubation:  Direct    Blade:  Beauchamp 2    Laryngeal View Grade: Grade IIA - cords partially seen      Difficult Airway Encountered?: No      Complications:  None    Airway Device:  Oral endotracheal tube    Airway Device Size:  7.5    Style/Cuff Inflation:  Cuffed (inflated to minimal occlusive pressure)    Tube secured:  21    Secured at:  The lips    Placement Verified By:  Capnometry    Complicating Factors:  Obesity, short neck, poor neck/head extension and oropharyngeal edema or fat    Findings Post-Intubation:  BS equal bilateral and atraumatic/condition of teeth unchanged

## 2022-01-25 NOTE — DISCHARGE INSTRUCTIONS
Leave bandaid on for 24 hours.    If bleeding occurs at procedure site, apply pressure by laying on a rolled up towel for 30 minutes.    Follow up with the physician that ordered the procedure.

## 2022-01-25 NOTE — SEDATION DOCUMENTATION
Band aid applied to right abdomen biopsy site with bleeding noted. Patient extubated and placed in  Stretcher and moved to pacu 2

## 2022-01-25 NOTE — SEDATION DOCUMENTATION
Anesthesia is on the case. Rolle intubated and placed on Ct table. For medications given and vital signs see anesthesia charting.

## 2022-01-25 NOTE — ANESTHESIA POSTPROCEDURE EVALUATION
Anesthesia Post Evaluation    Patient: Erendira Pretty    Procedure(s) Performed: Procedure(s) (LRB):  Liver Microwave Ablation (N/A)    Final Anesthesia Type: general      Patient location during evaluation: PACU  Patient participation: Yes- Able to Participate  Level of consciousness: awake  Post-procedure vital signs: reviewed and stable  Pain management: adequate  Airway patency: patent    PONV status at discharge: No PONV  Anesthetic complications: no      Cardiovascular status: blood pressure returned to baseline and hemodynamically stable  Respiratory status: unassisted and spontaneous ventilation  Hydration status: euvolemic  Follow-up not needed.              No case tracking events are documented in the log.      Pain/Natalie Score: No data recorded

## 2022-01-26 NOTE — DISCHARGE SUMMARY
Radiology Discharge Summary      Hospital Course: No complications    Admit Date: 1/25/2022  Discharge Date: 01/26/2022     Instructions Given to Patient: Yes  Diet: regular diet and Resume prior diet  Activity: activity as tolerated and no driving for today    Description of Condition on Discharge: Stable  Vital Signs (Most Recent): Temp: 97.9 °F (36.6 °C) (01/25/22 1600)  Pulse: 81 (01/25/22 1600)  Resp: 18 (01/25/22 1600)  BP: (!) 152/72 (01/25/22 1600)  SpO2: 96 % (01/25/22 1600)    Discharge Disposition: Home    Discharge Diagnosis: hcc     Follow-up: triple phase ct in 1-2 months    Red Puentes MD  Staff Radiologist  Department of Radiology  Pager: [phone]

## 2022-01-26 NOTE — PROCEDURES
Radiology Post-Procedure Note    Pre Op Diagnosis: hcc  Post Op Diagnosis: Same    Procedure: liver ablation    Procedure performed by: Dr Red Puentes    Written Informed Consent Obtained: Yes  Specimen Removed: NO  Estimated Blood Loss: Minimal    Findings:   No complication of right hepatic microwave ablation    Patient tolerated procedure well.    Red Puentes MD  Staff Radiologist  Department of Radiology  Pager: [phone]

## 2022-01-27 VITALS
OXYGEN SATURATION: 96 % | DIASTOLIC BLOOD PRESSURE: 72 MMHG | TEMPERATURE: 98 F | SYSTOLIC BLOOD PRESSURE: 152 MMHG | RESPIRATION RATE: 18 BRPM | HEART RATE: 81 BPM

## 2022-01-31 ENCOUNTER — TELEPHONE (OUTPATIENT)
Dept: HEPATOLOGY | Facility: CLINIC | Age: 77
End: 2022-01-31
Payer: MEDICARE

## 2022-01-31 ENCOUNTER — PATIENT MESSAGE (OUTPATIENT)
Dept: HEPATOLOGY | Facility: CLINIC | Age: 77
End: 2022-01-31
Payer: MEDICARE

## 2022-01-31 DIAGNOSIS — C22.0 HCC (HEPATOCELLULAR CARCINOMA): ICD-10-CM

## 2022-01-31 DIAGNOSIS — K74.69 OTHER CIRRHOSIS OF LIVER: Primary | ICD-10-CM

## 2022-01-31 NOTE — TELEPHONE ENCOUNTER
----- Message from Maxine Patel MA sent at 1/31/2022  1:44 PM CST -----  They're not in the system. Can you place them for me?  ----- Message -----  From: Anna Tomas MD  Sent: 1/31/2022  10:19 AM CST  To: Maxine Patel MA    Yes he needs CBC, CMP, AFP those should already be ordered.  ----- Message -----  From: Maxine Patel MA  Sent: 1/27/2022   3:07 PM CST  To: Anna Tomas MD    I've gotten his MRI moved from 6/7/22 to 4/4/22 (2 months from now). What labs would you like for him to have done?  ----- Message -----  From: Anna Tomas MD  Sent: 1/27/2022  11:59 AM CST  To: Thom [facility]TER[facility] Staff    Patient has MRI scheduled please make sure it is done with labs in 2 months from now

## 2022-02-01 ENCOUNTER — HOSPITAL ENCOUNTER (OUTPATIENT)
Dept: RADIOLOGY | Facility: HOSPITAL | Age: 77
Discharge: HOME | End: 2022-02-01
Attending: FAMILY MEDICINE
Payer: MEDICARE

## 2022-02-01 DIAGNOSIS — I77.89 OTHER SPECIFIED DISORDERS OF ARTERIES AND ARTERIOLES: ICD-10-CM

## 2022-02-01 PROCEDURE — 93880 US CAROTID BILATERAL: ICD-10-PCS | Mod: 26,,, | Performed by: RADIOLOGY

## 2022-02-01 PROCEDURE — 93880 EXTRACRANIAL BILAT STUDY: CPT | Mod: TC

## 2022-02-01 PROCEDURE — 93880 EXTRACRANIAL BILAT STUDY: CPT | Mod: 26,,, | Performed by: RADIOLOGY

## 2022-02-07 ENCOUNTER — OFFICE VISIT (OUTPATIENT)
Dept: OPHTHALMOLOGY | Facility: CLINIC | Age: 77
End: 2022-02-07
Payer: MEDICARE

## 2022-02-07 DIAGNOSIS — H35.373 EPIRETINAL MEMBRANE (ERM) OF BOTH EYES: ICD-10-CM

## 2022-02-07 DIAGNOSIS — Z79.4 CONTROLLED TYPE 2 DIABETES MELLITUS WITHOUT COMPLICATION, WITH LONG-TERM CURRENT USE OF INSULIN: Primary | ICD-10-CM

## 2022-02-07 DIAGNOSIS — E11.9 CONTROLLED TYPE 2 DIABETES MELLITUS WITHOUT COMPLICATION, WITH LONG-TERM CURRENT USE OF INSULIN: Primary | ICD-10-CM

## 2022-02-07 DIAGNOSIS — H40.013 OPEN ANGLE WITH BORDERLINE FINDINGS AND LOW GLAUCOMA RISK IN BOTH EYES: ICD-10-CM

## 2022-02-07 DIAGNOSIS — H04.129 DRYNESS, EYE: ICD-10-CM

## 2022-02-07 PROCEDURE — 92134 POSTERIOR SEGMENT OCT RETINA (OCULAR COHERENCE TOMOGRAPHY)-BOTH EYES: ICD-10-PCS | Mod: S$GLB,,, | Performed by: OPHTHALMOLOGY

## 2022-02-07 PROCEDURE — 1157F PR ADVANCE CARE PLAN OR EQUIV PRESENT IN MEDICAL RECORD: ICD-10-PCS | Mod: CPTII,S$GLB,, | Performed by: OPHTHALMOLOGY

## 2022-02-07 PROCEDURE — 99999 PR PBB SHADOW E&M-EST. PATIENT-LVL I: ICD-10-PCS | Mod: PBBFAC,,, | Performed by: OPHTHALMOLOGY

## 2022-02-07 PROCEDURE — 1159F PR MEDICATION LIST DOCUMENTED IN MEDICAL RECORD: ICD-10-PCS | Mod: CPTII,S$GLB,, | Performed by: OPHTHALMOLOGY

## 2022-02-07 PROCEDURE — 99999 PR PBB SHADOW E&M-EST. PATIENT-LVL I: CPT | Mod: PBBFAC,,, | Performed by: OPHTHALMOLOGY

## 2022-02-07 PROCEDURE — 1157F ADVNC CARE PLAN IN RCRD: CPT | Mod: CPTII,S$GLB,, | Performed by: OPHTHALMOLOGY

## 2022-02-07 PROCEDURE — 3051F PR MOST RECENT HEMOGLOBIN A1C LEVEL 7.0 - < 8.0%: ICD-10-PCS | Mod: CPTII,S$GLB,, | Performed by: OPHTHALMOLOGY

## 2022-02-07 PROCEDURE — 99213 OFFICE O/P EST LOW 20 MIN: CPT | Mod: S$GLB,,, | Performed by: OPHTHALMOLOGY

## 2022-02-07 PROCEDURE — 3051F HG A1C>EQUAL 7.0%<8.0%: CPT | Mod: CPTII,S$GLB,, | Performed by: OPHTHALMOLOGY

## 2022-02-07 PROCEDURE — 1159F MED LIST DOCD IN RCRD: CPT | Mod: CPTII,S$GLB,, | Performed by: OPHTHALMOLOGY

## 2022-02-07 PROCEDURE — 99213 PR OFFICE/OUTPT VISIT, EST, LEVL III, 20-29 MIN: ICD-10-PCS | Mod: S$GLB,,, | Performed by: OPHTHALMOLOGY

## 2022-02-07 PROCEDURE — 1160F RVW MEDS BY RX/DR IN RCRD: CPT | Mod: CPTII,S$GLB,, | Performed by: OPHTHALMOLOGY

## 2022-02-07 PROCEDURE — 92134 CPTRZ OPH DX IMG PST SGM RTA: CPT | Mod: S$GLB,,, | Performed by: OPHTHALMOLOGY

## 2022-02-07 PROCEDURE — 1160F PR REVIEW ALL MEDS BY PRESCRIBER/CLIN PHARMACIST DOCUMENTED: ICD-10-PCS | Mod: CPTII,S$GLB,, | Performed by: OPHTHALMOLOGY

## 2022-02-07 NOTE — PROGRESS NOTES
===============================  Erendira Pretty,  2/7/2022 today   76 y.o. male   Last visit [facility]: :9/3/2021   Last visit eye dept. 9/3/2021  [facility]:  Uncorrected distance visual acuity was 20/25+2 in the right eye and 20/25 in the left eye.  Tonometry     Tonometry (Applanation, 2:05 PM)       Right Left    Pressure 20 20              Not recorded       Not recorded       Not recorded       Chief Complaint   Patient presents with    Eye Problem     Black glob in OS       ________________  2/7/2022 today  HPI     Eye Problem      Additional comments: Black glob in OS              Comments     States that his OS has been seeing black lines in the corner of his OS   states this has been going on for a week. States that its worse when he's   around glare. States that it caused him to feel sick like he would vomit   and his vision was very blurry.     + DIET CONTROLLED DM  MINI ERM OU  DRY EYES  ON RESTASIS  OU LL PLUGS            Last edited by Ania Rosales on 2/7/2022  2:02 PM. (History)      Problem List Items Addressed This Visit        Eye/Vision problems    Epiretinal membrane - Both Eyes    Open angle with borderline findings and low glaucoma risk in both eyes    Overview     --based on c:d            Other    Dryness, eye - Both Eyes      Other Visit Diagnoses     Controlled type 2 diabetes mellitus without complication, with long-term current use of insulin    -  Primary          PCIOL OU  ERM OU, stable on MOCT  Temp PCM OU  Vit veils in temp periph, pt symptomatic  NO metastatic  findings         RTC 1 year or PRN  ===============================

## 2022-03-08 NOTE — PROGRESS NOTES
"Referring Provider:    No referring provider defined for this encounter.  Subjective:   Patient: Erendira Pretty [id number], :1945   Visit date:3/9/2022 9:32 AM    Chief Complaint: see below  HPI:       Sore Throat (States had sx in  and has had sore throat ever since. C/o runny nose as well)    Prior notes reviewed  Clinical documentation obtained by nursing staff reviewed.     Liver ablation 22, pt was intubated. C/o sore throat since.   Reported persistent clear mucus/drainage tinged with blood x 2 days.   Hx GERD, currently taking Aciphex 20 mg QD, has taken for many years.       Hx of ESS 2019, full house      Objective:     Physical Exam:  Vitals:  /80   Pulse 67   Temp 98.2 °F (36.8 °C) (Temporal)   Ht 5' 8" (1.727 m)   Wt 112.6 kg (248 lb 3.8 oz)   BMI 37.74 kg/m²   General appearance:  Well developed, well nourished    Ears:  Otoscopy of external auditory canals and tympanic membranes was normal, clinical speech reception thresholds grossly intact, no mass/lesion of auricle.    Nose:  See below    Mouth:  No mass/lesion of lips, teeth, gums, hard/soft palate, tongue, tonsils, or oropharynx.    Neck & Lymphatics:  No cervical lymphadenopathy, no neck mass/crepitus/ asymmetry, trachea is midline, no thyroid enlargement/tenderness/mass.      FLEXIBLE NASOLARYNGOSCOPY    Endoscopic Exam Findings:  -     Nasal secretions: - no discolored secretions noted - bilaterally  -     Nasal septum: no significant deviation and no perforation appreciated   -     Inferior turbinate: - normal mucosa without significant hypertrophy - bilaterally  -     Middle turbinate: - normal mucosa without significant hypertrophy - bilaterally  -     Other findings: mild polypoid changes in posterior ethmoids, R ; small polyps in frontal recess, R. No polyps on L side.    -     Laryngeal mucosa is normal  -     Posterior commissure has mild hypertrophy  -     Lingual tonsils have no  hypertrophy  -     Adenoids " have no  hypertrophy  -     Right vocal fold: normal mobility     mass/lesion: none  -     Left vocal fold: normal mobility     mass/lesion: none  -     Other findings: none        Assessment & Plan:   Gastroesophageal reflux disease, unspecified whether esophagitis present    Rhinorrhea    Other orders  -     azelastine (ASTELIN) 137 mcg (0.1 %) nasal spray; 2 sprays (274 mcg total) by Nasal route 2 (two) times daily.  Dispense: 30 mL; Refill: 6    Endoscopic exam with signs of GERD, aside wnl  Resume Astelin, refilled. If no relief, consider trial with Atrovent.   Increase Aciphex 20 mg to BID x 2 weeks    Thank you for allowing me to participate in the care of Erendira.       Alejandro Parkinson MD, FACS  [facility]   [facility]  [address]  [place] [address]  P: [phone]  F: [phone]

## 2022-03-09 ENCOUNTER — OFFICE VISIT (OUTPATIENT)
Dept: OTOLARYNGOLOGY | Facility: CLINIC | Age: 77
End: 2022-03-09
Payer: MEDICARE

## 2022-03-09 VITALS
BODY MASS INDEX: 37.62 KG/M2 | TEMPERATURE: 98 F | HEIGHT: 68 IN | HEART RATE: 67 BPM | SYSTOLIC BLOOD PRESSURE: 132 MMHG | DIASTOLIC BLOOD PRESSURE: 80 MMHG | WEIGHT: 248.25 LBS

## 2022-03-09 DIAGNOSIS — K21.9 GASTROESOPHAGEAL REFLUX DISEASE, UNSPECIFIED WHETHER ESOPHAGITIS PRESENT: Primary | ICD-10-CM

## 2022-03-09 DIAGNOSIS — J34.89 RHINORRHEA: ICD-10-CM

## 2022-03-09 PROCEDURE — 3075F PR MOST RECENT SYSTOLIC BLOOD PRESS GE 130-139MM HG: ICD-10-PCS | Mod: CPTII,S$GLB,, | Performed by: OTOLARYNGOLOGY

## 2022-03-09 PROCEDURE — 1125F PR PAIN SEVERITY QUANTIFIED, PAIN PRESENT: ICD-10-PCS | Mod: CPTII,S$GLB,, | Performed by: OTOLARYNGOLOGY

## 2022-03-09 PROCEDURE — 1101F PR PT FALLS ASSESS DOC 0-1 FALLS W/OUT INJ PAST YR: ICD-10-PCS | Mod: CPTII,S$GLB,, | Performed by: OTOLARYNGOLOGY

## 2022-03-09 PROCEDURE — 99999 PR PBB SHADOW E&M-EST. PATIENT-LVL IV: CPT | Mod: PBBFAC,,, | Performed by: OTOLARYNGOLOGY

## 2022-03-09 PROCEDURE — 3079F PR MOST RECENT DIASTOLIC BLOOD PRESSURE 80-89 MM HG: ICD-10-PCS | Mod: CPTII,S$GLB,, | Performed by: OTOLARYNGOLOGY

## 2022-03-09 PROCEDURE — 1125F AMNT PAIN NOTED PAIN PRSNT: CPT | Mod: CPTII,S$GLB,, | Performed by: OTOLARYNGOLOGY

## 2022-03-09 PROCEDURE — 99213 PR OFFICE/OUTPT VISIT, EST, LEVL III, 20-29 MIN: ICD-10-PCS | Mod: 25,S$GLB,, | Performed by: OTOLARYNGOLOGY

## 2022-03-09 PROCEDURE — 1101F PT FALLS ASSESS-DOCD LE1/YR: CPT | Mod: CPTII,S$GLB,, | Performed by: OTOLARYNGOLOGY

## 2022-03-09 PROCEDURE — 1157F ADVNC CARE PLAN IN RCRD: CPT | Mod: CPTII,S$GLB,, | Performed by: OTOLARYNGOLOGY

## 2022-03-09 PROCEDURE — 3079F DIAST BP 80-89 MM HG: CPT | Mod: CPTII,S$GLB,, | Performed by: OTOLARYNGOLOGY

## 2022-03-09 PROCEDURE — 1159F PR MEDICATION LIST DOCUMENTED IN MEDICAL RECORD: ICD-10-PCS | Mod: CPTII,S$GLB,, | Performed by: OTOLARYNGOLOGY

## 2022-03-09 PROCEDURE — 31575 DIAGNOSTIC LARYNGOSCOPY: CPT | Mod: S$GLB,,, | Performed by: OTOLARYNGOLOGY

## 2022-03-09 PROCEDURE — 31575 PR LARYNGOSCOPY, FLEXIBLE; DIAGNOSTIC: ICD-10-PCS | Mod: S$GLB,,, | Performed by: OTOLARYNGOLOGY

## 2022-03-09 PROCEDURE — 3072F LOW RISK FOR RETINOPATHY: CPT | Mod: CPTII,S$GLB,, | Performed by: OTOLARYNGOLOGY

## 2022-03-09 PROCEDURE — 3072F PR LOW RISK FOR RETINOPATHY: ICD-10-PCS | Mod: CPTII,S$GLB,, | Performed by: OTOLARYNGOLOGY

## 2022-03-09 PROCEDURE — 3075F SYST BP GE 130 - 139MM HG: CPT | Mod: CPTII,S$GLB,, | Performed by: OTOLARYNGOLOGY

## 2022-03-09 PROCEDURE — 3288F FALL RISK ASSESSMENT DOCD: CPT | Mod: CPTII,S$GLB,, | Performed by: OTOLARYNGOLOGY

## 2022-03-09 PROCEDURE — 1157F PR ADVANCE CARE PLAN OR EQUIV PRESENT IN MEDICAL RECORD: ICD-10-PCS | Mod: CPTII,S$GLB,, | Performed by: OTOLARYNGOLOGY

## 2022-03-09 PROCEDURE — 99213 OFFICE O/P EST LOW 20 MIN: CPT | Mod: 25,S$GLB,, | Performed by: OTOLARYNGOLOGY

## 2022-03-09 PROCEDURE — 1159F MED LIST DOCD IN RCRD: CPT | Mod: CPTII,S$GLB,, | Performed by: OTOLARYNGOLOGY

## 2022-03-09 PROCEDURE — 3288F PR FALLS RISK ASSESSMENT DOCUMENTED: ICD-10-PCS | Mod: CPTII,S$GLB,, | Performed by: OTOLARYNGOLOGY

## 2022-03-09 PROCEDURE — 99999 PR PBB SHADOW E&M-EST. PATIENT-LVL IV: ICD-10-PCS | Mod: PBBFAC,,, | Performed by: OTOLARYNGOLOGY

## 2022-03-09 RX ORDER — AZELASTINE 1 MG/ML
2 SPRAY, METERED NASAL 2 TIMES DAILY
Qty: 30 ML | Refills: 6 | Status: ON HOLD | OUTPATIENT
Start: 2022-03-09 | End: 2024-01-26 | Stop reason: HOSPADM

## 2022-03-14 ENCOUNTER — OFFICE VISIT (OUTPATIENT)
Dept: CARDIOLOGY | Facility: CLINIC | Age: 77
End: 2022-03-14
Payer: MEDICARE

## 2022-03-14 VITALS
SYSTOLIC BLOOD PRESSURE: 116 MMHG | HEIGHT: 68 IN | HEART RATE: 83 BPM | OXYGEN SATURATION: 99 % | RESPIRATION RATE: 16 BRPM | DIASTOLIC BLOOD PRESSURE: 72 MMHG | WEIGHT: 249.56 LBS | BODY MASS INDEX: 37.82 KG/M2

## 2022-03-14 DIAGNOSIS — M79.605 PAIN IN BOTH LOWER EXTREMITIES: Primary | ICD-10-CM

## 2022-03-14 DIAGNOSIS — I25.10 CORONARY ARTERY DISEASE INVOLVING NATIVE CORONARY ARTERY OF NATIVE HEART WITHOUT ANGINA PECTORIS: Chronic | ICD-10-CM

## 2022-03-14 DIAGNOSIS — Z95.1 S/P CABG X 2: ICD-10-CM

## 2022-03-14 DIAGNOSIS — I70.0 AORTIC ATHEROSCLEROSIS: ICD-10-CM

## 2022-03-14 DIAGNOSIS — M79.609 PAIN IN EXTREMITY, UNSPECIFIED EXTREMITY: ICD-10-CM

## 2022-03-14 DIAGNOSIS — K74.69 OTHER CIRRHOSIS OF LIVER: ICD-10-CM

## 2022-03-14 DIAGNOSIS — I35.0 NONRHEUMATIC AORTIC VALVE STENOSIS: Chronic | ICD-10-CM

## 2022-03-14 DIAGNOSIS — I10 ESSENTIAL HYPERTENSION: Chronic | ICD-10-CM

## 2022-03-14 DIAGNOSIS — M79.604 PAIN IN BOTH LOWER EXTREMITIES: Primary | ICD-10-CM

## 2022-03-14 DIAGNOSIS — I49.3 PVC (PREMATURE VENTRICULAR CONTRACTION): Primary | ICD-10-CM

## 2022-03-14 DIAGNOSIS — G47.33 OSA ON CPAP: Chronic | ICD-10-CM

## 2022-03-14 PROCEDURE — 99999 PR PBB SHADOW E&M-EST. PATIENT-LVL V: CPT | Mod: PBBFAC,,, | Performed by: INTERNAL MEDICINE

## 2022-03-14 PROCEDURE — 3072F LOW RISK FOR RETINOPATHY: CPT | Mod: CPTII,S$GLB,, | Performed by: INTERNAL MEDICINE

## 2022-03-14 PROCEDURE — 1157F ADVNC CARE PLAN IN RCRD: CPT | Mod: CPTII,S$GLB,, | Performed by: INTERNAL MEDICINE

## 2022-03-14 PROCEDURE — 1160F PR REVIEW ALL MEDS BY PRESCRIBER/CLIN PHARMACIST DOCUMENTED: ICD-10-PCS | Mod: CPTII,S$GLB,, | Performed by: INTERNAL MEDICINE

## 2022-03-14 PROCEDURE — 1126F AMNT PAIN NOTED NONE PRSNT: CPT | Mod: CPTII,S$GLB,, | Performed by: INTERNAL MEDICINE

## 2022-03-14 PROCEDURE — 3072F PR LOW RISK FOR RETINOPATHY: ICD-10-PCS | Mod: CPTII,S$GLB,, | Performed by: INTERNAL MEDICINE

## 2022-03-14 PROCEDURE — 1157F PR ADVANCE CARE PLAN OR EQUIV PRESENT IN MEDICAL RECORD: ICD-10-PCS | Mod: CPTII,S$GLB,, | Performed by: INTERNAL MEDICINE

## 2022-03-14 PROCEDURE — 3078F DIAST BP <80 MM HG: CPT | Mod: CPTII,S$GLB,, | Performed by: INTERNAL MEDICINE

## 2022-03-14 PROCEDURE — 99214 OFFICE O/P EST MOD 30 MIN: CPT | Mod: S$GLB,,, | Performed by: INTERNAL MEDICINE

## 2022-03-14 PROCEDURE — 99214 PR OFFICE/OUTPT VISIT, EST, LEVL IV, 30-39 MIN: ICD-10-PCS | Mod: S$GLB,,, | Performed by: INTERNAL MEDICINE

## 2022-03-14 PROCEDURE — 1159F PR MEDICATION LIST DOCUMENTED IN MEDICAL RECORD: ICD-10-PCS | Mod: CPTII,S$GLB,, | Performed by: INTERNAL MEDICINE

## 2022-03-14 PROCEDURE — 1126F PR PAIN SEVERITY QUANTIFIED, NO PAIN PRESENT: ICD-10-PCS | Mod: CPTII,S$GLB,, | Performed by: INTERNAL MEDICINE

## 2022-03-14 PROCEDURE — 99999 PR PBB SHADOW E&M-EST. PATIENT-LVL V: ICD-10-PCS | Mod: PBBFAC,,, | Performed by: INTERNAL MEDICINE

## 2022-03-14 PROCEDURE — 3078F PR MOST RECENT DIASTOLIC BLOOD PRESSURE < 80 MM HG: ICD-10-PCS | Mod: CPTII,S$GLB,, | Performed by: INTERNAL MEDICINE

## 2022-03-14 PROCEDURE — 1160F RVW MEDS BY RX/DR IN RCRD: CPT | Mod: CPTII,S$GLB,, | Performed by: INTERNAL MEDICINE

## 2022-03-14 PROCEDURE — 3074F PR MOST RECENT SYSTOLIC BLOOD PRESSURE < 130 MM HG: ICD-10-PCS | Mod: CPTII,S$GLB,, | Performed by: INTERNAL MEDICINE

## 2022-03-14 PROCEDURE — 1159F MED LIST DOCD IN RCRD: CPT | Mod: CPTII,S$GLB,, | Performed by: INTERNAL MEDICINE

## 2022-03-14 PROCEDURE — 3074F SYST BP LT 130 MM HG: CPT | Mod: CPTII,S$GLB,, | Performed by: INTERNAL MEDICINE

## 2022-03-14 RX ORDER — CARVEDILOL 6.25 MG/1
3.12 TABLET ORAL 2 TIMES DAILY WITH MEALS
Qty: 60 TABLET | Refills: 5
Start: 2022-03-14 | End: 2022-06-16

## 2022-03-14 NOTE — PROGRESS NOTES
Subjective:   Patient ID:  Erendira Pretty is a 76 y.o. male who presents for follow-up of No chief complaint on file.  Pts BP low and fatigue.Pt stopped zetia. Pt still with B limb pain with wallking.     Hypertension  This is a chronic problem. The current episode started more than 1 year ago. The problem has been gradually improving since onset. The problem is controlled. Pertinent negatives include no chest pain, palpitations or shortness of breath. Past treatments include ACE inhibitors, beta blockers and calcium channel blockers. The current treatment provides moderate improvement. There are no compliance problems.  Hypertensive end-organ damage includes CAD/MI.   Coronary Artery Disease  Presents for follow-up visit. Pertinent negatives include no chest pain, chest pressure, chest tightness, dizziness, leg swelling, muscle weakness, palpitations, shortness of breath or weight gain. The symptoms have been stable. Compliance with diet is variable. Compliance with exercise is variable. Compliance with medications is good.       Review of Systems   Constitutional: Negative. Negative for weight gain.   HENT: Negative.    Eyes: Negative.    Cardiovascular: Negative.  Negative for chest pain, leg swelling and palpitations.   Respiratory: Negative.  Negative for chest tightness and shortness of breath.    Endocrine: Negative.    Hematologic/Lymphatic: Negative.    Skin: Negative.    Musculoskeletal: Negative for muscle weakness.   Gastrointestinal: Negative.    Genitourinary: Negative.    Neurological: Negative.  Negative for dizziness.   Psychiatric/Behavioral: Negative.    Allergic/Immunologic: Negative.      Family History   Problem Relation Age of Onset    Heart disease Mother     Heart disease Father     Heart disease Brother     Colon cancer Neg Hx      Past Medical History:   Diagnosis Date    Aortic stenosis     Asthma     Benign prostatic hyperplasia with nocturia     Bilateral carotid artery stenosis  2021    US CAROTID BILATERAL 2021 IMPRESSION: Atherosclerosis with suspected stenosis greater than 50% at the right proximal ICA visually. Velocities are discordant and appear improved from prior. Atherosclerosis on the left with no evidence of flow-limiting stenosis greater than 50%.  FINDINGS: Right: Internal Carotid Artery (ICA): Peak systolic velocity 118 cm/sec. End diastolic velocity 35 cm/sec    BPH (benign prostatic hyperplasia)     CAD (coronary artery disease)     40% lesion ;laoz    Cirrhosis     Claudication 2014    Colon polyp     COPD (chronic obstructive pulmonary disease)     ED (erectile dysfunction)     Encounter for blood transfusion     Ex-smoker     Hearing loss NEC     Hepatitis C     Cured;dr gibbs harvoni 2015    Hyperlipidemia     Hypertension     Hypothyroid     s/p tx graves    Open angle with borderline findings and low glaucoma risk in both eyes 2020    DELONTE on CPAP     Osteoarthritis     PVD (peripheral vascular disease)     Secondary esophageal varices without bleeding 2017    Type 2 diabetes mellitus      Social History     Socioeconomic History    Marital status:      Spouse name: YAEL    Number of children: 2   Tobacco Use    Smoking status: Former Smoker     Packs/day: 0.50     Years: 4.00     Pack years: 2.00     Quit date: 1972     Years since quittin.7    Smokeless tobacco: Former User     Types: Chew     Quit date: 1976   Substance and Sexual Activity    Alcohol use: Yes     Alcohol/week: 2.0 standard drinks     Types: 2 Cans of beer per week    Drug use: No    Sexual activity: Yes     Partners: Female   Social History Narrative    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     Current Outpatient Medications on File Prior to Visit   Medication Sig Dispense Refill    ACCU-CHEK GRACE PLUS  METER Misc AS DIRECTED 1 each 0    amLODIPine (NORVASC) 10 MG tablet TAKE 1 TABLET BY MOUTH ONCE A DAY (DOSE INCREASE) 30 tablet 11    aspirin (ECOTRIN) 81 MG EC tablet Take 1 tablet (81 mg total) by mouth once daily. 90 tablet 4    azelastine (ASTELIN) 137 mcg (0.1 %) nasal spray 2 sprays (274 mcg total) by Nasal route 2 (two) times daily. 30 mL 6    blood sugar diagnostic (ACCU-CHEK GRACE PLUS TEST STRP) Strp TEST THREE TIMES A  strip 11    budesonide-formoterol 160-4.5 mcg (SYMBICORT) 160-4.5 mcg/actuation HFAA INHALE 2 PUFFS INTO THE LUNGS TWO TIMES A DAY. 10.2 g 11    carvediloL (COREG) 6.25 MG tablet Take 1 tablet (6.25 mg total) by mouth 2 (two) times daily with meals. 60 tablet 5    empagliflozin (JARDIANCE) 25 mg tablet Take 1 tablet (25 mg total) by mouth once daily. 90 tablet 3    fenofibrate (TRICOR) 54 MG tablet Take 1 tablet (54 mg total) by mouth once daily. 30 tablet 11    finasteride (PROSCAR) 5 mg tablet Take 1 tablet (5 mg total) by mouth once daily. 30 tablet 11    fluticasone propionate (FLONASE) 50 mcg/actuation nasal spray 2 sprays (100 mcg total) by Each Nostril route once daily. 18.2 mL 6    furosemide (LASIX) 40 MG tablet TAKE ONE BY MOUTH TWO TIMES A DAY, AND TAKE ONE BY MOUTH IF NEEDED FOR 14 DAYS. 90 tablet 5    GLUCOSAMINE HCL/CHONDR ROSARIO A NA (OSTEO BI-FLEX ORAL) Take 1 tablet by mouth 2 (two) times daily.       insulin (LANTUS SOLOSTAR U-100 INSULIN) glargine 100 units/mL (3mL) SubQ pen INJECT 44 UNITS UNDER THE SKIN EVERY EVENING 15 mL 6    krill oil 500 mg Cap Take by mouth.      lancets (ACCU-CHEK FASTCLIX LANCET DRUM) Misc TEST TWO TIMES A  each 5    levocetirizine (XYZAL) 5 MG tablet TAKE 1 TABLET BY MOUTH EVERY EVENING 30 tablet 11    levothyroxine (SYNTHROID) 100 MCG tablet Take 1 tablet (100 mcg total) by mouth before breakfast. 90 tablet 3    lisinopriL 10 MG tablet TAKE 1 BY MOUTH EVERY DAY 30 tablet 6    metFORMIN (GLUCOPHAGE) 500 MG tablet  "TAKE 1 TABLET BY MOUTH TWICE DAILY WITH MEALS 180 tablet 3    montelukast (SINGULAIR) 10 mg tablet Take 1 tablet (10 mg total) by mouth once daily. 30 tablet 11    pen needle, diabetic (BD ULTRA-FINE MINI PEN NEEDLE) 31 gauge x 3/16" Ndle USE AS DIRECTED 100 each 11    RABEprazole (ACIPHEX) 20 mg tablet Take 1 tablet (20 mg total) by mouth 2 (two) times daily. 60 tablet 11    sildenafiL (VIAGRA) 100 MG tablet Take 1/2 to 1 tablet by mouth one hour prior to intercourse. Max 100mg per day (Patient taking differently: Take 1/2 to 1 tablet by mouth one hour prior to intercourse. Max 100mg per day) 30 tablet 1    ezetimibe (ZETIA) 10 mg tablet TAKE 1 TABLET BY MOUTH ONCE A DAY (Patient not taking: Reported on 3/14/2022) 30 tablet 11     Current Facility-Administered Medications on File Prior to Visit   Medication Dose Route Frequency Provider Last Rate Last Admin    lactated ringers infusion   Intravenous Continuous Omaira Theodore MD   New Bag at 09/09/19 1117     Review of patient's allergies indicates:   Allergen Reactions    Lipitor [atorvastatin] Other (See Comments)     Muscle aches and pains as well as fatigue.     Niacin preparations Other (See Comments)     Causes broken blood vessels and bruises at 4 times normal dose.    Statins-hmg-coa reductase inhibitors Other (See Comments)     Statins cause intolerable myalgias.    Iodinated contrast media Hives     Tachycardia    Omeprazole Hives and Itching    Prilosec [omeprazole magnesium] Hives and Itching       Objective:     Physical Exam  Vitals and nursing note reviewed.   Constitutional:       Appearance: He is well-developed.   HENT:      Head: Normocephalic and atraumatic.   Eyes:      Conjunctiva/sclera: Conjunctivae normal.      Pupils: Pupils are equal, round, and reactive to light.   Cardiovascular:      Rate and Rhythm: Normal rate and regular rhythm.      Pulses: Intact distal pulses.      Heart sounds: Normal heart sounds. "   Pulmonary:      Effort: Pulmonary effort is normal.      Breath sounds: Normal breath sounds.   Abdominal:      General: Bowel sounds are normal.      Palpations: Abdomen is soft.   Musculoskeletal:      Cervical back: Normal range of motion and neck supple.   Skin:     General: Skin is warm and dry.   Neurological:      Mental Status: He is alert and oriented to person, place, and time.         Assessment:     1. PVC (premature ventricular contraction)    2. S/P CABG x 2    3. Aortic atherosclerosis    4. Essential hypertension    5. Coronary artery disease involving native coronary artery of native heart without angina pectoris    6. Nonrheumatic aortic valve stenosis    7. DELONTE on CPAP        Plan:     PVC (premature ventricular contraction)    S/P CABG x 2    Aortic atherosclerosis    Essential hypertension    Coronary artery disease involving native coronary artery of native heart without angina pectoris    Nonrheumatic aortic valve stenosis    DELONTE on CPAP      Decrease coreg 1/2 tab bid- low BP/fatigue  CONTINUE LISINOPRIL, NIORAVSC- HTN  Restart zetia  Exercise sanya

## 2022-03-21 ENCOUNTER — HOSPITAL ENCOUNTER (OUTPATIENT)
Dept: CARDIOLOGY | Facility: HOSPITAL | Age: 77
Discharge: HOME | End: 2022-03-21
Attending: INTERNAL MEDICINE
Payer: MEDICARE

## 2022-03-21 ENCOUNTER — OFFICE VISIT (OUTPATIENT)
Dept: OPHTHALMOLOGY | Facility: CLINIC | Age: 77
End: 2022-03-21
Payer: MEDICARE

## 2022-03-21 VITALS
HEIGHT: 68 IN | WEIGHT: 249 LBS | SYSTOLIC BLOOD PRESSURE: 168 MMHG | BODY MASS INDEX: 37.74 KG/M2 | DIASTOLIC BLOOD PRESSURE: 75 MMHG

## 2022-03-21 DIAGNOSIS — M79.604 PAIN IN BOTH LOWER EXTREMITIES: ICD-10-CM

## 2022-03-21 DIAGNOSIS — M79.605 PAIN IN BOTH LOWER EXTREMITIES: ICD-10-CM

## 2022-03-21 DIAGNOSIS — E11.9 CONTROLLED TYPE 2 DIABETES MELLITUS WITHOUT COMPLICATION, WITH LONG-TERM CURRENT USE OF INSULIN: Primary | ICD-10-CM

## 2022-03-21 DIAGNOSIS — H04.129 DRYNESS, EYE: ICD-10-CM

## 2022-03-21 DIAGNOSIS — Z79.4 CONTROLLED TYPE 2 DIABETES MELLITUS WITHOUT COMPLICATION, WITH LONG-TERM CURRENT USE OF INSULIN: Primary | ICD-10-CM

## 2022-03-21 DIAGNOSIS — H35.373 EPIRETINAL MEMBRANE (ERM) OF BOTH EYES: ICD-10-CM

## 2022-03-21 DIAGNOSIS — Z96.1 PSEUDOPHAKIA: ICD-10-CM

## 2022-03-21 DIAGNOSIS — H26.492 LEFT POSTERIOR CAPSULAR OPACIFICATION: ICD-10-CM

## 2022-03-21 PROCEDURE — 99499 NO LOS: ICD-10-PCS | Mod: S$GLB,,, | Performed by: OPHTHALMOLOGY

## 2022-03-21 PROCEDURE — 1159F MED LIST DOCD IN RCRD: CPT | Mod: CPTII,S$GLB,, | Performed by: OPHTHALMOLOGY

## 2022-03-21 PROCEDURE — 93922 ANKLE BRACHIAL INDICES (ABI): ICD-10-PCS | Mod: 26,,, | Performed by: INTERNAL MEDICINE

## 2022-03-21 PROCEDURE — 1126F AMNT PAIN NOTED NONE PRSNT: CPT | Mod: CPTII,S$GLB,, | Performed by: OPHTHALMOLOGY

## 2022-03-21 PROCEDURE — 66821 YAG CAPSULOTOMY - OS - LEFT EYE: ICD-10-PCS | Mod: LT,S$GLB,, | Performed by: OPHTHALMOLOGY

## 2022-03-21 PROCEDURE — 93922 UPR/L XTREMITY ART 2 LEVELS: CPT | Mod: 26,,, | Performed by: INTERNAL MEDICINE

## 2022-03-21 PROCEDURE — 99999 PR PBB SHADOW E&M-EST. PATIENT-LVL III: CPT | Mod: PBBFAC,,, | Performed by: OPHTHALMOLOGY

## 2022-03-21 PROCEDURE — 1160F RVW MEDS BY RX/DR IN RCRD: CPT | Mod: CPTII,S$GLB,, | Performed by: OPHTHALMOLOGY

## 2022-03-21 PROCEDURE — 99999 PR PBB SHADOW E&M-EST. PATIENT-LVL III: ICD-10-PCS | Mod: PBBFAC,,, | Performed by: OPHTHALMOLOGY

## 2022-03-21 PROCEDURE — 1126F PR PAIN SEVERITY QUANTIFIED, NO PAIN PRESENT: ICD-10-PCS | Mod: CPTII,S$GLB,, | Performed by: OPHTHALMOLOGY

## 2022-03-21 PROCEDURE — 93922 UPR/L XTREMITY ART 2 LEVELS: CPT | Mod: PO

## 2022-03-21 PROCEDURE — 92134 CPTRZ OPH DX IMG PST SGM RTA: CPT | Mod: S$GLB,,, | Performed by: OPHTHALMOLOGY

## 2022-03-21 PROCEDURE — 1157F PR ADVANCE CARE PLAN OR EQUIV PRESENT IN MEDICAL RECORD: ICD-10-PCS | Mod: CPTII,S$GLB,, | Performed by: OPHTHALMOLOGY

## 2022-03-21 PROCEDURE — 1160F PR REVIEW ALL MEDS BY PRESCRIBER/CLIN PHARMACIST DOCUMENTED: ICD-10-PCS | Mod: CPTII,S$GLB,, | Performed by: OPHTHALMOLOGY

## 2022-03-21 PROCEDURE — 66821 AFTER CATARACT LASER SURGERY: CPT | Mod: LT,S$GLB,, | Performed by: OPHTHALMOLOGY

## 2022-03-21 PROCEDURE — 1157F ADVNC CARE PLAN IN RCRD: CPT | Mod: CPTII,S$GLB,, | Performed by: OPHTHALMOLOGY

## 2022-03-21 PROCEDURE — 1159F PR MEDICATION LIST DOCUMENTED IN MEDICAL RECORD: ICD-10-PCS | Mod: CPTII,S$GLB,, | Performed by: OPHTHALMOLOGY

## 2022-03-21 PROCEDURE — 92134 POSTERIOR SEGMENT OCT RETINA (OCULAR COHERENCE TOMOGRAPHY)-BOTH EYES: ICD-10-PCS | Mod: S$GLB,,, | Performed by: OPHTHALMOLOGY

## 2022-03-21 PROCEDURE — 99499 UNLISTED E&M SERVICE: CPT | Mod: S$GLB,,, | Performed by: OPHTHALMOLOGY

## 2022-03-21 NOTE — PROGRESS NOTES
===============================  Date today is 3/21/2022  Erendira Pretty is a 76 y.o. male  Last visit [facility]: :2/7/2022   Last visit eye dept. 2/7/2022    Uncorrected distance visual acuity was 20/30 in the right eye and 20/50 in the left eye.  Tonometry     Tonometry (Applanation, 2:35 PM)       Right Left    Pressure 14 14              Not recorded       Not recorded       Not recorded       Chief Complaint   Patient presents with    Diabetes     Yearly exam       Problem List Items Addressed This Visit        Eye/Vision problems    Epiretinal membrane - Both Eyes    Relevant Orders    Posterior Segment OCT Retina-Both eyes (Completed)    Pseudophakia       Other    Dryness, eye - Both Eyes      Other Visit Diagnoses     Controlled type 2 diabetes mellitus without complication, with long-term current use of insulin    -  Primary    Relevant Orders    Posterior Segment OCT Retina-Both eyes (Completed)    Left posterior capsular opacification        Relevant Orders    Yag Capsulotomy - OS - Left Eye (Completed)          ________________  3/21/2022 today    PCIOL OU  S/p YAG OD  PCM OS- proceed with YAG OS today    RTC 7-10 days    Follow up:    Yag Capsulotomy Procedure:    76 y.o. year old patient experiencing a symptomatic decrease in vision OS with inability to perform activities of daily living including reading.    SLE: Posterior intraocular lens implant with capsular fibrosis     Risks, benefits and alternatives of Yag Laser Capsulotomy discussed. Including risks of retinal detachment (1-3%), macular edema, dislocated implant, pain, inflammation elevated intraocular pressure and vision loss. Consent signed.    Medications given:   Iopidine gtt  Tetracaine gtt    Laser energy settings:  3.9  mJ / burst  85  bursts    IMPRESSION:  Yag Capsulotomy OS well tolerated    PLAN:     Postoperative precautions discussed  Rtc 1-2 weeks for recheck and refraction        .      ===============================

## 2022-03-22 LAB
LEFT ARM BP: 157 MMHG
LEFT TBI: 0.7
LEFT TOE PRESSURE: 118 MMHG
RIGHT ARM BP: 168 MMHG
RIGHT TBI: 0.57
RIGHT TOE PRESSURE: 95 MMHG

## 2022-03-22 RX ORDER — SILDENAFIL 100 MG/1
TABLET, FILM COATED ORAL
Qty: 30 TABLET | Refills: 1 | Status: SHIPPED | OUTPATIENT
Start: 2022-03-22 | End: 2023-07-03 | Stop reason: SDUPTHER

## 2022-03-22 RX ORDER — SILDENAFIL 100 MG/1
TABLET, FILM COATED ORAL
Qty: 30 TABLET | Refills: 1 | OUTPATIENT
Start: 2022-03-22

## 2022-03-23 ENCOUNTER — TELEPHONE (OUTPATIENT)
Dept: CARDIOLOGY | Facility: CLINIC | Age: 77
End: 2022-03-23
Payer: MEDICARE

## 2022-03-23 NOTE — TELEPHONE ENCOUNTER
The patient has been notified of this information and all questions answered.  JACKY is wnl limits. Patient verbalized understanding.

## 2022-03-23 NOTE — TELEPHONE ENCOUNTER
----- Message from Frank Gallardo MD sent at 3/22/2022  4:55 PM CDT -----  Please tell pt:  JACKY is wnl limits

## 2022-03-29 DIAGNOSIS — R80.9 TYPE 2 DIABETES MELLITUS WITH MICROALBUMINURIA, WITH LONG-TERM CURRENT USE OF INSULIN: Chronic | ICD-10-CM

## 2022-03-29 DIAGNOSIS — E11.29 TYPE 2 DIABETES MELLITUS WITH MICROALBUMINURIA, WITH LONG-TERM CURRENT USE OF INSULIN: Chronic | ICD-10-CM

## 2022-03-29 DIAGNOSIS — Z79.4 TYPE 2 DIABETES MELLITUS WITH MICROALBUMINURIA, WITH LONG-TERM CURRENT USE OF INSULIN: Chronic | ICD-10-CM

## 2022-03-29 RX ORDER — INSULIN GLARGINE 100 [IU]/ML
INJECTION, SOLUTION SUBCUTANEOUS
Qty: 15 ML | Refills: 6 | Status: SHIPPED | OUTPATIENT
Start: 2022-03-29 | End: 2022-12-15

## 2022-03-29 NOTE — TELEPHONE ENCOUNTER
This Rx Request does not qualify for assessment with the [facility]   Please review protocol details and the Care Due Message for extra clinical information    Reasons Rx Request may be deferred:  Patient has been seen in the ED/Hospital since the last PCP visit    Note composed:2:35 PM 03/29/2022

## 2022-03-29 NOTE — TELEPHONE ENCOUNTER
Care Due:                  Date            Visit Type   Department     Provider  --------------------------------------------------------------------------------                                EP -                              PRIMARY      [facility] INTERNAL  Last Visit: 12-      CARE ([facility])   [facility]       Bhupinder Callaway                              EP -                              PRIMARY      [facility] INTERNAL  Next Visit: 06-      [facility] ([facility])   [facility]                                                            Last  Test          Frequency    Reason                     Performed    Due Date  --------------------------------------------------------------------------------    HBA1C.......  6 months...  empagliflozin, insulin,    12- 06-                             metFORMIN................    Powered by [employer] Reference number: [id number].   3/29/2022 7:47:33 AM CDT

## 2022-04-04 ENCOUNTER — HOSPITAL ENCOUNTER (OUTPATIENT)
Dept: RADIOLOGY | Facility: HOSPITAL | Age: 77
Discharge: HOME | End: 2022-04-04
Attending: INTERNAL MEDICINE
Payer: MEDICARE

## 2022-04-04 ENCOUNTER — OFFICE VISIT (OUTPATIENT)
Dept: OPHTHALMOLOGY | Facility: CLINIC | Age: 77
End: 2022-04-04
Payer: MEDICARE

## 2022-04-04 DIAGNOSIS — H40.013 OPEN ANGLE WITH BORDERLINE FINDINGS AND LOW GLAUCOMA RISK IN BOTH EYES: ICD-10-CM

## 2022-04-04 DIAGNOSIS — Z98.42 HISTORY OF YAG LASER CAPSULOTOMY OF LENS, LEFT: ICD-10-CM

## 2022-04-04 DIAGNOSIS — E11.9 CONTROLLED TYPE 2 DIABETES MELLITUS WITHOUT COMPLICATION, WITH LONG-TERM CURRENT USE OF INSULIN: ICD-10-CM

## 2022-04-04 DIAGNOSIS — H35.373 EPIRETINAL MEMBRANE (ERM) OF BOTH EYES: ICD-10-CM

## 2022-04-04 DIAGNOSIS — Z79.4 CONTROLLED TYPE 2 DIABETES MELLITUS WITHOUT COMPLICATION, WITH LONG-TERM CURRENT USE OF INSULIN: ICD-10-CM

## 2022-04-04 DIAGNOSIS — Z85.05 HISTORY OF HEPATOCELLULAR CARCINOMA: ICD-10-CM

## 2022-04-04 DIAGNOSIS — Z96.1 PSEUDOPHAKIA: ICD-10-CM

## 2022-04-04 DIAGNOSIS — Z98.890 POST-OPERATIVE STATE: Primary | ICD-10-CM

## 2022-04-04 PROCEDURE — 74183 MRI ABD W/O CNTR FLWD CNTR: CPT | Mod: 26,,, | Performed by: RADIOLOGY

## 2022-04-04 PROCEDURE — 1160F RVW MEDS BY RX/DR IN RCRD: CPT | Mod: CPTII,S$GLB,, | Performed by: OPHTHALMOLOGY

## 2022-04-04 PROCEDURE — 1126F PR PAIN SEVERITY QUANTIFIED, NO PAIN PRESENT: ICD-10-PCS | Mod: CPTII,S$GLB,, | Performed by: OPHTHALMOLOGY

## 2022-04-04 PROCEDURE — 74183 MRI ABDOMEN W WO CONTRAST: ICD-10-PCS | Mod: 26,,, | Performed by: RADIOLOGY

## 2022-04-04 PROCEDURE — 99024 PR POST-OP FOLLOW-UP VISIT: ICD-10-PCS | Mod: S$GLB,,, | Performed by: OPHTHALMOLOGY

## 2022-04-04 PROCEDURE — 1159F MED LIST DOCD IN RCRD: CPT | Mod: CPTII,S$GLB,, | Performed by: OPHTHALMOLOGY

## 2022-04-04 PROCEDURE — 99999 PR PBB SHADOW E&M-EST. PATIENT-LVL III: ICD-10-PCS | Mod: PBBFAC,,, | Performed by: OPHTHALMOLOGY

## 2022-04-04 PROCEDURE — 1160F PR REVIEW ALL MEDS BY PRESCRIBER/CLIN PHARMACIST DOCUMENTED: ICD-10-PCS | Mod: CPTII,S$GLB,, | Performed by: OPHTHALMOLOGY

## 2022-04-04 PROCEDURE — 1157F PR ADVANCE CARE PLAN OR EQUIV PRESENT IN MEDICAL RECORD: ICD-10-PCS | Mod: CPTII,S$GLB,, | Performed by: OPHTHALMOLOGY

## 2022-04-04 PROCEDURE — 25500020 PHARM REV CODE 255: Performed by: INTERNAL MEDICINE

## 2022-04-04 PROCEDURE — 74183 MRI ABD W/O CNTR FLWD CNTR: CPT | Mod: TC

## 2022-04-04 PROCEDURE — 1126F AMNT PAIN NOTED NONE PRSNT: CPT | Mod: CPTII,S$GLB,, | Performed by: OPHTHALMOLOGY

## 2022-04-04 PROCEDURE — 99999 PR PBB SHADOW E&M-EST. PATIENT-LVL III: CPT | Mod: PBBFAC,,, | Performed by: OPHTHALMOLOGY

## 2022-04-04 PROCEDURE — 1157F ADVNC CARE PLAN IN RCRD: CPT | Mod: CPTII,S$GLB,, | Performed by: OPHTHALMOLOGY

## 2022-04-04 PROCEDURE — A9585 GADOBUTROL INJECTION: HCPCS | Performed by: INTERNAL MEDICINE

## 2022-04-04 PROCEDURE — 99024 POSTOP FOLLOW-UP VISIT: CPT | Mod: S$GLB,,, | Performed by: OPHTHALMOLOGY

## 2022-04-04 PROCEDURE — 1159F PR MEDICATION LIST DOCUMENTED IN MEDICAL RECORD: ICD-10-PCS | Mod: CPTII,S$GLB,, | Performed by: OPHTHALMOLOGY

## 2022-04-04 RX ORDER — GADOBUTROL 604.72 MG/ML
10 INJECTION INTRAVENOUS
Status: COMPLETED | OUTPATIENT
Start: 2022-04-04 | End: 2022-04-04

## 2022-04-04 RX ADMIN — GADOBUTROL 10 ML: 604.72 INJECTION INTRAVENOUS at 08:04

## 2022-04-04 NOTE — PROGRESS NOTES
===============================  Date today is 4/4/2022  Erendira Pretty is a 76 y.o. male  Last visit [facility]: :3/21/2022   Last visit eye dept. 3/21/2022    Uncorrected distance visual acuity was 20/25 in the right eye and 20/50 in the left eye.  Tonometry     Tonometry (Applanation, 10:48 AM)       Right Left    Pressure 14 14              Not recorded       Manifest Refraction     Manifest Refraction       Sphere Cylinder Axis Dist [facility]    Right        Left [place] +1.00 180 20/20              Not recorded       Chief Complaint   Patient presents with    Post-op Evaluation     S/P YAG OS     HPI     Post-op Evaluation      Additional comments: S/P YAG OS              Comments     + DIET CONTROLLED DM  MINI ERM OU  DRY EYES  ON RESTASIS  OU LL PLUGS  YAG OS3/21/2022          Last edited by DUANE Samson on 4/4/2022  8:30 AM. (History)      Problem List Items Addressed This Visit        Eye/Vision problems    Epiretinal membrane - Both Eyes    Pseudophakia    Open angle with borderline findings and low glaucoma risk in both eyes    Overview     --based on c:d             Other Visit Diagnoses     Post-operative state    -  Primary    History of YAG laser capsulotomy of lens, left        Controlled type 2 diabetes mellitus without complication, with long-term current use of insulin              ________________  4/4/2022 today      Post YAG OS 3/21/22  [facility] today 20/20 post YAG  PCIOL OU  DM no DR  Ok to use readers    [facility] 1 year for diabetic exam  Instructed to call 24/7 for any worsening of vision or symptoms. Check OU daily.   Gave my office and cell phone number.    .      ===============================

## 2022-04-05 ENCOUNTER — PATIENT MESSAGE (OUTPATIENT)
Dept: HEPATOLOGY | Facility: CLINIC | Age: 77
End: 2022-04-05
Payer: MEDICARE

## 2022-04-25 NOTE — PROGRESS NOTES
Subjective:       Patient ID: Erendira Pretty is a 76 y.o. male.  Patient Active Problem List   Diagnosis    Type 2 diabetes mellitus with kidney complication, with long-term current use of insulin    Essential hypertension    Class 2 severe obesity due to excess calories with serious comorbidity and body mass index (BMI) of 37.0 to 37.9 in adult    PVC (premature ventricular contraction)    Acquired hypothyroidism    Coronary artery disease involving native coronary artery of native heart without angina pectoris    Dryness, eye - Both Eyes    Epiretinal membrane - Both Eyes    PVD (peripheral vascular disease)    ED (erectile dysfunction)    Anorectal fistula    Hepatic cirrhosis    Benign prostatic hyperplasia with nocturia    DELONTE on CPAP    Mild intermittent asthma without complication    Pseudophakia    Colon polyps    Anal fissure    Secondary esophageal varices without bleeding    Asbestos exposure    Chest pain, moderate coronary artery risk    Aortic stenosis mild    Urethral polyp    Angina pectoris    Left main coronary artery disease    Mixed hyperlipidemia    S/P CABG x 2    Pain in both lower extremities    Chronic pansinusitis    Nasal polyposis    Non-seasonal allergic rhinitis due to pollen    Iron deficiency anemia due to chronic blood loss    GERD (gastroesophageal reflux disease)    HCC (hepatocellular carcinoma)    DNR (do not resuscitate)    Open angle with borderline findings and low glaucoma risk in both eyes    Aortic atherosclerosis    Type 2 diabetes mellitus with microalbuminuria, with long-term current use of insulin    Bilateral carotid artery stenosis    Paroxysmal atrial fibrillation    Statin intolerance     Immunization History   Administered Date(s) Administered    COVID-19, MRNA, LN-S, PF (Pfizer) (Purple Cap) 01/04/2021, 01/26/2021, 09/30/2021    Hepatitis A 07/24/2003, 01/12/2004    Hepatitis A, Pediatric/Adolescent, 2 Dose 07/24/2003,  2004    Hepatitis B 2003, 2003, 2004, 2014, 2014    Hepatitis B, Adult 2014, 2014, 10/20/2014    Hepatitis B, Pediatric/Adolescent 2003, 2003, 2004    Influenza 2004, 10/17/2007, 10/20/2008, 10/07/2009, 10/20/2010    Influenza (FLUAD) - Trivalent - Adjuvanted - PF (65+) 2018, 2019    Influenza - High Dose - PF (65 years and older) 10/03/2011, 10/29/2013, 10/21/2014, 2015, 11/10/2016, 10/18/2017, 2018, 10/01/2020, 2021    Influenza - Quadrivalent - PF *Preferred* (6 months and older) 2004    Influenza - Trivalent (ADULT) 2004, 10/17/2007, 10/20/2008, 10/07/2009, 10/20/2010, 2012    Influenza A (H1N1) 2009 Monovalent - IM - PF 2009    Influenza Split 2012    Pneumococcal Conjugate - 13 Valent 2015    Pneumococcal Polysaccharide - 23 Valent 2014    Tdap 2014    Zoster 2012    Zoster Recombinant 2018, 2019, 2019     Social History     Tobacco Use   Smoking Status Former Smoker    Packs/day: 0.50    Years: 4.00    Pack years: 2.00    Quit date: 1972    Years since quittin.9   Smokeless Tobacco Former User    Types: Chew    Quit date: 1976      Asthma Control Test  In the past 4  weeks, how much of the time did your asthma keep you from getting as much done at work, school or at home?: None of the time  During the past 4 weeks, how often have you had shortness of breath?: Not at all  During the past 4 weeks, how often did your asthma symptoms (wheezing, couging, shortness of breath, chest tightness or pain) wake you up at night or earlier than usual in the morning?: Not at all  During the past 4 weeks, how often have you used your rescue inhaler or nebulizer medication (such as albuterol)?: Not at all  How would you rate your asthma control during the past 4 weeks?: Completely controlled  If your score is 19 or  less, your asthma may not be under control: 25         EPWORTH SLEEPINESS SCALE 4/26/2022   Sitting and reading 3   Watching TV 3   Sitting, inactive in a public place (e.g. a theatre or a meeting) 0   As a passenger in a car for an hour without a break 0   Lying down to rest in the afternoon when circumstances permit 3   Sitting and talking to someone 0   Sitting quietly after a lunch without alcohol 3   In a car, while stopped for a few minutes in traffic 0   Total score 12        MMRC Dyspnea Scale (4 is worst)     [] MMRC 0: Dyspneic on strenuous excercise (0 points)    [] MMRC 1: Dyspneic on walking a slight hill (0 points)    [] MMRC 2: Dyspneic on walking level ground; must stop occasionally due to breathlessness (1 point)    [] MMRC 3: Must stop for breathlessness after walking 100 yards or after a few minutes (2 points)    [] MMRC 4: Cannot leave house; breathless on dressing/undressing (3 points)        Chief Complaint: Asthma and Sleep Apnea    Mr Maria De Jesus Krishna is 76y.o.   DELONTE on CPAP, Chr wheezy asthmatic bronchitis, Chr nasal congestion  No interval exacerbations  immunizations current  Has no SOB, no cough, congestion  Lingering issue with leg muscle aches: vascular studies normal  LV 04/28/2021  Has new CPAP machine: Dream station 2  Here to review results  1. CPAP Download: CPAP 11 cm, Usage > 4 hrs was 100%  2. Spirometry was Normal  3. CXR Normal  Average AHI 2.5  Asthma score 25     Using nebuliser  Currently on SYMBICORT: refills sent.  No daytime sleepiness  Ep worth score 12   Immunizations are current        Asthma  There is no cough, shortness of breath, sputum production or wheezing. Pertinent negatives include no headaches or orthopnea. His past medical history is significant for asthma.     Review of Systems   Constitutional: Negative for fatigue.   HENT: Negative for voice change and congestion.    Eyes: Negative.    Respiratory: Negative for snoring, cough, sputum production, shortness of  "breath, wheezing, orthopnea, asthma nighttime symptoms, pleurisy, dyspnea on extertion, use of rescue inhaler, somnolence and Paroxysmal Nocturnal Dyspnea.    Cardiovascular: Negative for leg swelling.   Genitourinary: Negative.    Endocrine: endocrine negative   Musculoskeletal: Negative.    Skin: Negative.    Gastrointestinal: Negative.    Neurological: Negative for headaches.   Psychiatric/Behavioral: Negative.    All other systems reviewed and are negative.      Objective:       Vitals:    04/26/22 1259   BP: 130/78   Pulse: 71   Resp: 14   SpO2: 95%   Weight: 111.7 kg (246 lb 5.8 oz)   Height: 5' 8" (1.727 m)     Physical Exam   Constitutional: He is oriented to person, place, and time. Vital signs are normal. He appears well-developed and well-nourished. He is obese.   HENT:   Head: Normocephalic.   Nose: No mucosal edema or rhinorrhea.   Mouth/Throat: Oropharynx is clear and moist. Mallampati Score: III.   Cardiovascular: Normal rate, regular rhythm and intact distal pulses.   No murmur heard.  Pulmonary/Chest: Normal expansion, symmetric chest wall expansion, effort normal and breath sounds normal. He has no decreased breath sounds. He has no rhonchi. He has no rales.   Abdominal: Soft. Bowel sounds are normal.   Musculoskeletal:         General: No edema. Normal range of motion.      Cervical back: Normal range of motion and neck supple.   Lymphadenopathy:     He has no cervical adenopathy.   Neurological: He is alert and oriented to person, place, and time. He has normal reflexes.   Skin: Skin is warm and dry.   Psychiatric: He has a normal mood and affect.   Nursing note and vitals reviewed.    Personal Diagnostic Review        Personal Diagnostic Review  [x]  DOWNLOAD       [x]  SPIROMETRY     FEv1: 2.46L( 87%), FVC 3.41L( 90.4%)  FEV1/FVC 72    Normal    X-Ray Chest PA And Lateral  Narrative: EXAMINATION:  XR CHEST PA AND LATERAL    CLINICAL HISTORY:  Unspecified chronic bronchitis    TECHNIQUE:  PA and " lateral views of the chest were performed.    COMPARISON:  2021    FINDINGS:  The cardiac and mediastinal silhouettes appear within normal limits. Mediastinal postoperative changes are again noted.  The lungs are clear bilaterally.  No acute osseous findings demonstrated.  Impression: No acute findings.    Electronically signed by: Yaniv Ravi MD  Date:    2022  Time:    11:21      No flowsheet data found.      Assessment:       Problem List Items Addressed This Visit     Type 2 diabetes mellitus with microalbuminuria, with long-term current use of insulin (Chronic)    Type 2 diabetes mellitus with kidney complication, with long-term current use of insulin (Chronic)     STABLE ON INSULIN SOLOSTAR, Jardiance, Metformin           Essential hypertension (Chronic)     Stable on NORVASC           Acquired hypothyroidism (Chronic)     Stable on Synthroid           DELONTE on CPAP (Chronic)     Benefits and compliant with CPAP 11 cm with optimal control AHI: 2.5  Dream station 2  Nasal wisp mask  HME: [facility]   Using CPAP and benefits  Usage > 100% was 100%           Relevant Orders    CPAP/BIPAP SUPPLIES    Nasal polyposis    Class 2 severe obesity due to excess calories with serious comorbidity and body mass index (BMI) of 37.0 to 37.9 in adult    Mild intermittent asthma without complication - Primary     ACT score 25  immunizations are current    FEV1 and  FVC improved  Med: SYMBICORT and RESCUE albuterol  FEV1:2.54 L ( 87%), FVC 3.41L( 90.4%)  FEV1/FVC 72    CXR                 Relevant Medications    budesonide-formoterol 160-4.5 mcg (SYMBICORT) 160-4.5 mcg/actuation HFAA    albuterol (VENTOLIN HFA) 90 mcg/actuation inhaler    Other Relevant Orders    X-Ray Chest PA And Lateral    Fraction of  Nitric Oxide    Spirometry with/without bronchodilator    IgE    Asbestos exposure     Annual radiological surveilance           Mixed hyperlipidemia     STABLE ON ZETIA, TRICOR           Chronic  pansinusitis     Follow with ENT           Non-seasonal allergic rhinitis due to pollen     Stable   Astelin and Xyzal           HCC (hepatocellular carcinoma)     Stable in remission           Aortic atherosclerosis     Seen on imaging. Heart healthy diet              Other Visit Diagnoses     Muscle pain        Relevant Orders    MADAY Screen w/Reflex    CK    ALDOLASE    Chronic wheezy bronchitis               Plan:          Requested Prescriptions     Signed Prescriptions Disp Refills    budesonide-formoterol 160-4.5 mcg (SYMBICORT) 160-4.5 mcg/actuation HFAA 10.2 g 11     Sig: INHALE 2 PUFFS INTO THE LUNGS TWO TIMES A DAY.    albuterol (VENTOLIN HFA) 90 mcg/actuation inhaler 18 g 3     Sig: Inhale 2 puffs into the lungs every 6 (six) hours as needed for Wheezing or Shortness of Breath. Rescue     Orders Placed This Encounter   Procedures    CPAP/BIPAP SUPPLIES     Order Specific Question:   Length of need (1-99 months):     Answer:   99     Order Specific Question:   Choose ONE mask type and its corresponding cushions and/or pillows:     Answer:    Nasal Cushion Mask, 1 per 90 days:  Nasal Cushions, (6 per 90 days)     Order Specific Question:   Choose EITHER Heated or Non-Heated Tubjing     Answer:    Non-Heated Tubing, 1 per 90 days     Order Specific Question:   All other supplies as needed as listed below:     Answer:    Headgear, 1 per 180 days     Order Specific Question:   All other supplies as needed as listed below:     Answer:    Chin Strap, 1 per 180 days     Order Specific Question:   All other supplies as needed as listed below:     Answer:    Disposable Filter, 6 per 90 days     Order Specific Question:   All other supplies as needed as listed below:     Answer:    Non-Disposable Filter, 1 per 180 days     Order Specific Question:   All other supplies as needed as listed below:     Answer:    Exhalation Port, contact payer for quantity/frequency     Order  Specific Question:   All other supplies as needed as listed below:     Answer:    Humidifier Chamber, 1 per 180 days    X-Ray Chest PA And Lateral     Standing Status:   Future     Standing Expiration Date:   2023    MADAY Screen w/Reflex     SLE DX: 710.0     Standing Status:   Future     Number of Occurrences:   1     Standing Expiration Date:   2023    CK     Standing Status:   Future     Number of Occurrences:   1     Standing Expiration Date:   2023    ALDOLASE     Standing Status:   Future     Number of Occurrences:   1     Standing Expiration Date:   2023    IgE     Standing Status:   Future     Number of Occurrences:   1     Standing Expiration Date:   2023    Fraction of  Nitric Oxide     Standing Status:   Future     Standing Expiration Date:   2023     Order Specific Question:   Release to patient     Answer:   Immediate    Spirometry with/without bronchodilator     Standing Status:   Future     Standing Expiration Date:   2023     Order Specific Question:   Release to patient     Answer:   Immediate         Follow up in about 1 year (around 2023), or weight loss, CPAP supplies, CXR, [place], FeNO, refill meds, labs today.    This note was prepared using voice recognition system and is likely to have sound alike errors that may have been overlooked even after proof reading.  Please call me with any questions    Discussed diagnosis, its evaluation, treatment and usual course. All questions answered.    Thank you for the courtesy of participating in the care of this patient    Rishi Molina MD

## 2022-04-26 ENCOUNTER — OFFICE VISIT (OUTPATIENT)
Dept: PULMONOLOGY | Facility: CLINIC | Age: 77
End: 2022-04-26
Payer: MEDICARE

## 2022-04-26 ENCOUNTER — CLINICAL SUPPORT (OUTPATIENT)
Dept: PULMONOLOGY | Facility: CLINIC | Age: 77
End: 2022-04-26
Payer: MEDICARE

## 2022-04-26 ENCOUNTER — HOSPITAL ENCOUNTER (OUTPATIENT)
Dept: RADIOLOGY | Facility: HOSPITAL | Age: 77
Discharge: HOME | End: 2022-04-26
Attending: INTERNAL MEDICINE
Payer: MEDICARE

## 2022-04-26 VITALS
HEART RATE: 71 BPM | WEIGHT: 246.38 LBS | DIASTOLIC BLOOD PRESSURE: 78 MMHG | OXYGEN SATURATION: 95 % | RESPIRATION RATE: 14 BRPM | BODY MASS INDEX: 37.34 KG/M2 | SYSTOLIC BLOOD PRESSURE: 130 MMHG | HEIGHT: 68 IN

## 2022-04-26 DIAGNOSIS — J42 CHRONIC WHEEZY BRONCHITIS: ICD-10-CM

## 2022-04-26 DIAGNOSIS — Z79.4 TYPE 2 DIABETES MELLITUS WITH CHRONIC KIDNEY DISEASE, WITH LONG-TERM CURRENT USE OF INSULIN, UNSPECIFIED CKD STAGE: Chronic | ICD-10-CM

## 2022-04-26 DIAGNOSIS — G47.33 OSA ON CPAP: Chronic | ICD-10-CM

## 2022-04-26 DIAGNOSIS — E11.22 TYPE 2 DIABETES MELLITUS WITH CHRONIC KIDNEY DISEASE, WITH LONG-TERM CURRENT USE OF INSULIN, UNSPECIFIED CKD STAGE: Chronic | ICD-10-CM

## 2022-04-26 DIAGNOSIS — R80.9 TYPE 2 DIABETES MELLITUS WITH MICROALBUMINURIA, WITH LONG-TERM CURRENT USE OF INSULIN: Chronic | ICD-10-CM

## 2022-04-26 DIAGNOSIS — J30.1 NON-SEASONAL ALLERGIC RHINITIS DUE TO POLLEN: ICD-10-CM

## 2022-04-26 DIAGNOSIS — E03.9 ACQUIRED HYPOTHYROIDISM: Chronic | ICD-10-CM

## 2022-04-26 DIAGNOSIS — E78.2 MIXED HYPERLIPIDEMIA: ICD-10-CM

## 2022-04-26 DIAGNOSIS — I70.0 AORTIC ATHEROSCLEROSIS: ICD-10-CM

## 2022-04-26 DIAGNOSIS — E11.29 TYPE 2 DIABETES MELLITUS WITH MICROALBUMINURIA, WITH LONG-TERM CURRENT USE OF INSULIN: Chronic | ICD-10-CM

## 2022-04-26 DIAGNOSIS — Z77.090 ASBESTOS EXPOSURE: ICD-10-CM

## 2022-04-26 DIAGNOSIS — J32.4 CHRONIC PANSINUSITIS: ICD-10-CM

## 2022-04-26 DIAGNOSIS — J45.20 MILD INTERMITTENT ASTHMA WITHOUT COMPLICATION: Primary | ICD-10-CM

## 2022-04-26 DIAGNOSIS — M79.10 MUSCLE PAIN: ICD-10-CM

## 2022-04-26 DIAGNOSIS — C22.0 HCC (HEPATOCELLULAR CARCINOMA): ICD-10-CM

## 2022-04-26 DIAGNOSIS — I10 ESSENTIAL HYPERTENSION: Chronic | ICD-10-CM

## 2022-04-26 DIAGNOSIS — Z79.4 TYPE 2 DIABETES MELLITUS WITH MICROALBUMINURIA, WITH LONG-TERM CURRENT USE OF INSULIN: Chronic | ICD-10-CM

## 2022-04-26 DIAGNOSIS — E66.01 CLASS 2 SEVERE OBESITY DUE TO EXCESS CALORIES WITH SERIOUS COMORBIDITY AND BODY MASS INDEX (BMI) OF 37.0 TO 37.9 IN ADULT: ICD-10-CM

## 2022-04-26 DIAGNOSIS — J33.9 NASAL POLYPOSIS: ICD-10-CM

## 2022-04-26 LAB
BRPFT: NORMAL
FEF 25 75 LLN: 0.84
FEF 25 75 PRE REF: 74.3 %
FEF 25 75 REF: 2.09
FEV1 FVC LLN: 61
FEV1 FVC PRE REF: 95.7 %
FEV1 FVC REF: 75
FEV1 LLN: 2
FEV1 PRE REF: 87 %
FEV1 REF: 2.83
FVC LLN: 2.76
FVC PRE REF: 90.4 %
FVC REF: 3.78
PEF LLN: 5.22
PEF PRE REF: 94.7 %
PEF REF: 7.41
PRE FEF 25 75: 1.55 L/S (ref 0.84–3.34)
PRE FET 100: 10.66 SEC
PRE FEV1 FVC: 72.25 % (ref 61.23–89.76)
PRE FEV1: 2.46 L (ref 2–3.67)
PRE FVC: 3.41 L (ref 2.76–4.8)
PRE PEF: 7.01 L/S (ref 5.22–9.6)

## 2022-04-26 PROCEDURE — 99999 PR PBB SHADOW E&M-EST. PATIENT-LVL V: ICD-10-PCS | Mod: PBBFAC,,, | Performed by: INTERNAL MEDICINE

## 2022-04-26 PROCEDURE — 3075F SYST BP GE 130 - 139MM HG: CPT | Mod: CPTII,S$GLB,, | Performed by: INTERNAL MEDICINE

## 2022-04-26 PROCEDURE — 1101F PT FALLS ASSESS-DOCD LE1/YR: CPT | Mod: CPTII,S$GLB,, | Performed by: INTERNAL MEDICINE

## 2022-04-26 PROCEDURE — 71046 X-RAY EXAM CHEST 2 VIEWS: CPT | Mod: 26,,, | Performed by: RADIOLOGY

## 2022-04-26 PROCEDURE — 94010 BREATHING CAPACITY TEST: CPT | Mod: S$GLB,,, | Performed by: INTERNAL MEDICINE

## 2022-04-26 PROCEDURE — 99999 PR PBB SHADOW E&M-EST. PATIENT-LVL V: CPT | Mod: PBBFAC,,, | Performed by: INTERNAL MEDICINE

## 2022-04-26 PROCEDURE — 1159F PR MEDICATION LIST DOCUMENTED IN MEDICAL RECORD: ICD-10-PCS | Mod: CPTII,S$GLB,, | Performed by: INTERNAL MEDICINE

## 2022-04-26 PROCEDURE — 3051F PR MOST RECENT HEMOGLOBIN A1C LEVEL 7.0 - < 8.0%: ICD-10-PCS | Mod: CPTII,S$GLB,, | Performed by: INTERNAL MEDICINE

## 2022-04-26 PROCEDURE — 3051F HG A1C>EQUAL 7.0%<8.0%: CPT | Mod: CPTII,S$GLB,, | Performed by: INTERNAL MEDICINE

## 2022-04-26 PROCEDURE — 3075F PR MOST RECENT SYSTOLIC BLOOD PRESS GE 130-139MM HG: ICD-10-PCS | Mod: CPTII,S$GLB,, | Performed by: INTERNAL MEDICINE

## 2022-04-26 PROCEDURE — 3288F PR FALLS RISK ASSESSMENT DOCUMENTED: ICD-10-PCS | Mod: CPTII,S$GLB,, | Performed by: INTERNAL MEDICINE

## 2022-04-26 PROCEDURE — 1159F MED LIST DOCD IN RCRD: CPT | Mod: CPTII,S$GLB,, | Performed by: INTERNAL MEDICINE

## 2022-04-26 PROCEDURE — 99214 OFFICE O/P EST MOD 30 MIN: CPT | Mod: 25,S$GLB,, | Performed by: INTERNAL MEDICINE

## 2022-04-26 PROCEDURE — 3072F LOW RISK FOR RETINOPATHY: CPT | Mod: CPTII,S$GLB,, | Performed by: INTERNAL MEDICINE

## 2022-04-26 PROCEDURE — 1157F ADVNC CARE PLAN IN RCRD: CPT | Mod: CPTII,S$GLB,, | Performed by: INTERNAL MEDICINE

## 2022-04-26 PROCEDURE — 99214 PR OFFICE/OUTPT VISIT, EST, LEVL IV, 30-39 MIN: ICD-10-PCS | Mod: 25,S$GLB,, | Performed by: INTERNAL MEDICINE

## 2022-04-26 PROCEDURE — 3288F FALL RISK ASSESSMENT DOCD: CPT | Mod: CPTII,S$GLB,, | Performed by: INTERNAL MEDICINE

## 2022-04-26 PROCEDURE — 71046 X-RAY EXAM CHEST 2 VIEWS: CPT | Mod: TC

## 2022-04-26 PROCEDURE — 71046 XR CHEST PA AND LATERAL: ICD-10-PCS | Mod: 26,,, | Performed by: RADIOLOGY

## 2022-04-26 PROCEDURE — 3072F PR LOW RISK FOR RETINOPATHY: ICD-10-PCS | Mod: CPTII,S$GLB,, | Performed by: INTERNAL MEDICINE

## 2022-04-26 PROCEDURE — 1101F PR PT FALLS ASSESS DOC 0-1 FALLS W/OUT INJ PAST YR: ICD-10-PCS | Mod: CPTII,S$GLB,, | Performed by: INTERNAL MEDICINE

## 2022-04-26 PROCEDURE — 3078F DIAST BP <80 MM HG: CPT | Mod: CPTII,S$GLB,, | Performed by: INTERNAL MEDICINE

## 2022-04-26 PROCEDURE — 94010 BREATHING CAPACITY TEST: ICD-10-PCS | Mod: S$GLB,,, | Performed by: INTERNAL MEDICINE

## 2022-04-26 PROCEDURE — 3078F PR MOST RECENT DIASTOLIC BLOOD PRESSURE < 80 MM HG: ICD-10-PCS | Mod: CPTII,S$GLB,, | Performed by: INTERNAL MEDICINE

## 2022-04-26 PROCEDURE — 1157F PR ADVANCE CARE PLAN OR EQUIV PRESENT IN MEDICAL RECORD: ICD-10-PCS | Mod: CPTII,S$GLB,, | Performed by: INTERNAL MEDICINE

## 2022-04-26 RX ORDER — ALBUTEROL SULFATE 90 UG/1
2 AEROSOL, METERED RESPIRATORY (INHALATION) EVERY 6 HOURS PRN
Qty: 18 G | Refills: 3 | Status: SHIPPED | OUTPATIENT
Start: 2022-04-26 | End: 2023-11-10 | Stop reason: SDUPTHER

## 2022-04-26 RX ORDER — BUDESONIDE AND FORMOTEROL FUMARATE DIHYDRATE 160; 4.5 UG/1; UG/1
AEROSOL RESPIRATORY (INHALATION)
Qty: 10.2 G | Refills: 11 | Status: SHIPPED | OUTPATIENT
Start: 2022-04-26 | End: 2023-07-21 | Stop reason: SDUPTHER

## 2022-04-26 NOTE — PATIENT INSTRUCTIONS
Lung Anatomy  Your lungs take air in to give your body oxygen, which the body needs to work. Your lungs, like all the tissues in your body, are made up of billions of tiny specialized cells. Old lung cells die and are replaced by new, identical lung cells. This natural process helps ensure healthy lungs.    Date Last Reviewed: 11/1/2016  © 1652-2190 [employer]. [address] [place] [address], [place] [address]. All rights reserved. This information is not intended as a substitute for professional medical care. Always follow your healthcare professional's instructions.

## 2022-04-26 NOTE — ASSESSMENT & PLAN NOTE
Benefits and compliant with CPAP 11 cm with optimal control AHI: 2.5  Dream station 2  Nasal wisp mask  HME: [facility]   Using CPAP and benefits  Usage > 100% was 100%

## 2022-04-26 NOTE — ASSESSMENT & PLAN NOTE
ACT score 25  immunizations are current    FEV1 and  FVC improved  Med: SYMBICORT and RESCUE albuterol  FEV1:2.54 L ( 87%), FVC 3.41L( 90.4%)  FEV1/FVC 72    CXR

## 2022-05-26 ENCOUNTER — OFFICE VISIT (OUTPATIENT)
Dept: CARDIOLOGY | Facility: CLINIC | Age: 77
End: 2022-05-26
Payer: MEDICARE

## 2022-05-26 VITALS
DIASTOLIC BLOOD PRESSURE: 64 MMHG | SYSTOLIC BLOOD PRESSURE: 114 MMHG | HEIGHT: 68 IN | BODY MASS INDEX: 37.36 KG/M2 | WEIGHT: 246.5 LBS | OXYGEN SATURATION: 98 % | HEART RATE: 64 BPM

## 2022-05-26 DIAGNOSIS — Z95.1 S/P CABG X 2: Primary | ICD-10-CM

## 2022-05-26 DIAGNOSIS — I49.3 PVC (PREMATURE VENTRICULAR CONTRACTION): ICD-10-CM

## 2022-05-26 DIAGNOSIS — Z78.9 STATIN INTOLERANCE: Chronic | ICD-10-CM

## 2022-05-26 DIAGNOSIS — I73.9 PVD (PERIPHERAL VASCULAR DISEASE): ICD-10-CM

## 2022-05-26 DIAGNOSIS — I70.0 AORTIC ATHEROSCLEROSIS: ICD-10-CM

## 2022-05-26 DIAGNOSIS — G47.33 OSA ON CPAP: Chronic | ICD-10-CM

## 2022-05-26 DIAGNOSIS — I10 ESSENTIAL HYPERTENSION: Chronic | ICD-10-CM

## 2022-05-26 DIAGNOSIS — I48.0 PAROXYSMAL ATRIAL FIBRILLATION: ICD-10-CM

## 2022-05-26 PROCEDURE — 99214 PR OFFICE/OUTPT VISIT, EST, LEVL IV, 30-39 MIN: ICD-10-PCS | Mod: S$GLB,,, | Performed by: INTERNAL MEDICINE

## 2022-05-26 PROCEDURE — 1157F PR ADVANCE CARE PLAN OR EQUIV PRESENT IN MEDICAL RECORD: ICD-10-PCS | Mod: CPTII,S$GLB,, | Performed by: INTERNAL MEDICINE

## 2022-05-26 PROCEDURE — 1160F RVW MEDS BY RX/DR IN RCRD: CPT | Mod: CPTII,S$GLB,, | Performed by: INTERNAL MEDICINE

## 2022-05-26 PROCEDURE — 1126F PR PAIN SEVERITY QUANTIFIED, NO PAIN PRESENT: ICD-10-PCS | Mod: CPTII,S$GLB,, | Performed by: INTERNAL MEDICINE

## 2022-05-26 PROCEDURE — 1157F ADVNC CARE PLAN IN RCRD: CPT | Mod: CPTII,S$GLB,, | Performed by: INTERNAL MEDICINE

## 2022-05-26 PROCEDURE — 3288F PR FALLS RISK ASSESSMENT DOCUMENTED: ICD-10-PCS | Mod: CPTII,S$GLB,, | Performed by: INTERNAL MEDICINE

## 2022-05-26 PROCEDURE — 3074F SYST BP LT 130 MM HG: CPT | Mod: CPTII,S$GLB,, | Performed by: INTERNAL MEDICINE

## 2022-05-26 PROCEDURE — 1159F MED LIST DOCD IN RCRD: CPT | Mod: CPTII,S$GLB,, | Performed by: INTERNAL MEDICINE

## 2022-05-26 PROCEDURE — 99999 PR PBB SHADOW E&M-EST. PATIENT-LVL V: CPT | Mod: PBBFAC,,, | Performed by: INTERNAL MEDICINE

## 2022-05-26 PROCEDURE — 1101F PT FALLS ASSESS-DOCD LE1/YR: CPT | Mod: CPTII,S$GLB,, | Performed by: INTERNAL MEDICINE

## 2022-05-26 PROCEDURE — 1159F PR MEDICATION LIST DOCUMENTED IN MEDICAL RECORD: ICD-10-PCS | Mod: CPTII,S$GLB,, | Performed by: INTERNAL MEDICINE

## 2022-05-26 PROCEDURE — 99214 OFFICE O/P EST MOD 30 MIN: CPT | Mod: S$GLB,,, | Performed by: INTERNAL MEDICINE

## 2022-05-26 PROCEDURE — 3078F PR MOST RECENT DIASTOLIC BLOOD PRESSURE < 80 MM HG: ICD-10-PCS | Mod: CPTII,S$GLB,, | Performed by: INTERNAL MEDICINE

## 2022-05-26 PROCEDURE — 1101F PR PT FALLS ASSESS DOC 0-1 FALLS W/OUT INJ PAST YR: ICD-10-PCS | Mod: CPTII,S$GLB,, | Performed by: INTERNAL MEDICINE

## 2022-05-26 PROCEDURE — 1160F PR REVIEW ALL MEDS BY PRESCRIBER/CLIN PHARMACIST DOCUMENTED: ICD-10-PCS | Mod: CPTII,S$GLB,, | Performed by: INTERNAL MEDICINE

## 2022-05-26 PROCEDURE — 3078F DIAST BP <80 MM HG: CPT | Mod: CPTII,S$GLB,, | Performed by: INTERNAL MEDICINE

## 2022-05-26 PROCEDURE — 3072F LOW RISK FOR RETINOPATHY: CPT | Mod: CPTII,S$GLB,, | Performed by: INTERNAL MEDICINE

## 2022-05-26 PROCEDURE — 1126F AMNT PAIN NOTED NONE PRSNT: CPT | Mod: CPTII,S$GLB,, | Performed by: INTERNAL MEDICINE

## 2022-05-26 PROCEDURE — 99999 PR PBB SHADOW E&M-EST. PATIENT-LVL V: ICD-10-PCS | Mod: PBBFAC,,, | Performed by: INTERNAL MEDICINE

## 2022-05-26 PROCEDURE — 3072F PR LOW RISK FOR RETINOPATHY: ICD-10-PCS | Mod: CPTII,S$GLB,, | Performed by: INTERNAL MEDICINE

## 2022-05-26 PROCEDURE — 3288F FALL RISK ASSESSMENT DOCD: CPT | Mod: CPTII,S$GLB,, | Performed by: INTERNAL MEDICINE

## 2022-05-26 PROCEDURE — 3074F PR MOST RECENT SYSTOLIC BLOOD PRESSURE < 130 MM HG: ICD-10-PCS | Mod: CPTII,S$GLB,, | Performed by: INTERNAL MEDICINE

## 2022-05-26 RX ORDER — FUROSEMIDE 40 MG/1
40 TABLET ORAL 2 TIMES DAILY
Qty: 60 TABLET | Refills: 11 | Status: SHIPPED | OUTPATIENT
Start: 2022-05-26 | End: 2023-08-01 | Stop reason: SDUPTHER

## 2022-05-30 ENCOUNTER — OFFICE VISIT (OUTPATIENT)
Dept: OTOLARYNGOLOGY | Facility: CLINIC | Age: 77
End: 2022-05-30
Payer: MEDICARE

## 2022-05-30 ENCOUNTER — TELEPHONE (OUTPATIENT)
Dept: OTOLARYNGOLOGY | Facility: CLINIC | Age: 77
End: 2022-05-30
Payer: MEDICARE

## 2022-05-30 ENCOUNTER — LAB VISIT (OUTPATIENT)
Dept: LAB | Facility: HOSPITAL | Age: 77
End: 2022-05-30
Attending: INTERNAL MEDICINE
Payer: MEDICARE

## 2022-05-30 ENCOUNTER — OFFICE VISIT (OUTPATIENT)
Dept: HEPATOLOGY | Facility: CLINIC | Age: 77
End: 2022-05-30
Payer: MEDICARE

## 2022-05-30 VITALS
WEIGHT: 251.31 LBS | HEART RATE: 70 BPM | DIASTOLIC BLOOD PRESSURE: 64 MMHG | SYSTOLIC BLOOD PRESSURE: 124 MMHG | BODY MASS INDEX: 38.21 KG/M2 | TEMPERATURE: 98 F

## 2022-05-30 VITALS
HEART RATE: 70 BPM | HEIGHT: 68 IN | DIASTOLIC BLOOD PRESSURE: 64 MMHG | BODY MASS INDEX: 3.94 KG/M2 | SYSTOLIC BLOOD PRESSURE: 124 MMHG | WEIGHT: 26 LBS

## 2022-05-30 DIAGNOSIS — Z85.05 HISTORY OF HEPATOCELLULAR CARCINOMA: ICD-10-CM

## 2022-05-30 DIAGNOSIS — H61.23 BILATERAL IMPACTED CERUMEN: ICD-10-CM

## 2022-05-30 DIAGNOSIS — K74.69 OTHER CIRRHOSIS OF LIVER: ICD-10-CM

## 2022-05-30 DIAGNOSIS — C22.0 HCC (HEPATOCELLULAR CARCINOMA): Primary | ICD-10-CM

## 2022-05-30 LAB
AFP SERPL-MCNC: 4.4 NG/ML (ref 0–8.4)
ALBUMIN SERPL BCP-MCNC: 3.7 G/DL (ref 3.5–5.2)
ALP SERPL-CCNC: 73 U/L (ref 55–135)
ALT SERPL W/O P-5'-P-CCNC: 23 U/L (ref 10–44)
ANION GAP SERPL CALC-SCNC: 12 MMOL/L (ref 8–16)
AST SERPL-CCNC: 26 U/L (ref 10–40)
BASOPHILS # BLD AUTO: 0.04 K/UL (ref 0–0.2)
BASOPHILS NFR BLD: 0.8 % (ref 0–1.9)
BILIRUB SERPL-MCNC: 0.3 MG/DL (ref 0.1–1)
BUN SERPL-MCNC: 19 MG/DL (ref 8–23)
CALCIUM SERPL-MCNC: 9.2 MG/DL (ref 8.7–10.5)
CHLORIDE SERPL-SCNC: 102 MMOL/L (ref 95–110)
CO2 SERPL-SCNC: 24 MMOL/L (ref 23–29)
CREAT SERPL-MCNC: 1.3 MG/DL (ref 0.5–1.4)
DIFFERENTIAL METHOD: ABNORMAL
EOSINOPHIL # BLD AUTO: 0.2 K/UL (ref 0–0.5)
EOSINOPHIL NFR BLD: 4.4 % (ref 0–8)
ERYTHROCYTE [DISTWIDTH] IN BLOOD BY AUTOMATED COUNT: 14.8 % (ref 11.5–14.5)
EST. GFR  (AFRICAN AMERICAN): >60 ML/MIN/1.73 M^2
EST. GFR  (NON AFRICAN AMERICAN): 53 ML/MIN/1.73 M^2
GLUCOSE SERPL-MCNC: 157 MG/DL (ref 70–110)
HCT VFR BLD AUTO: 41.3 % (ref 40–54)
HGB BLD-MCNC: 13.6 G/DL (ref 14–18)
IMM GRANULOCYTES # BLD AUTO: 0.02 K/UL (ref 0–0.04)
IMM GRANULOCYTES NFR BLD AUTO: 0.4 % (ref 0–0.5)
INR PPP: 1 (ref 0.8–1.2)
LYMPHOCYTES # BLD AUTO: 1.3 K/UL (ref 1–4.8)
LYMPHOCYTES NFR BLD: 25.9 % (ref 18–48)
MCH RBC QN AUTO: 30.2 PG (ref 27–31)
MCHC RBC AUTO-ENTMCNC: 32.9 G/DL (ref 32–36)
MCV RBC AUTO: 92 FL (ref 82–98)
MONOCYTES # BLD AUTO: 0.5 K/UL (ref 0.3–1)
MONOCYTES NFR BLD: 10.1 % (ref 4–15)
NEUTROPHILS # BLD AUTO: 3 K/UL (ref 1.8–7.7)
NEUTROPHILS NFR BLD: 58.4 % (ref 38–73)
NRBC BLD-RTO: 0 /100 WBC
PLATELET # BLD AUTO: 170 K/UL (ref 150–450)
PMV BLD AUTO: 10.4 FL (ref 9.2–12.9)
POTASSIUM SERPL-SCNC: 4.3 MMOL/L (ref 3.5–5.1)
PROT SERPL-MCNC: 7.7 G/DL (ref 6–8.4)
PROTHROMBIN TIME: 10.8 SEC (ref 9–12.5)
RBC # BLD AUTO: 4.5 M/UL (ref 4.6–6.2)
SODIUM SERPL-SCNC: 138 MMOL/L (ref 136–145)
WBC # BLD AUTO: 5.05 K/UL (ref 3.9–12.7)

## 2022-05-30 PROCEDURE — 82105 ALPHA-FETOPROTEIN SERUM: CPT | Performed by: INTERNAL MEDICINE

## 2022-05-30 PROCEDURE — 1157F PR ADVANCE CARE PLAN OR EQUIV PRESENT IN MEDICAL RECORD: ICD-10-PCS | Mod: CPTII,S$GLB,, | Performed by: INTERNAL MEDICINE

## 2022-05-30 PROCEDURE — 1159F PR MEDICATION LIST DOCUMENTED IN MEDICAL RECORD: ICD-10-PCS | Mod: CPTII,S$GLB,, | Performed by: OTOLARYNGOLOGY

## 2022-05-30 PROCEDURE — 99499 UNLISTED E&M SERVICE: CPT | Mod: S$GLB,,, | Performed by: OTOLARYNGOLOGY

## 2022-05-30 PROCEDURE — 99999 PR PBB SHADOW E&M-EST. PATIENT-LVL IV: ICD-10-PCS | Mod: PBBFAC,,, | Performed by: OTOLARYNGOLOGY

## 2022-05-30 PROCEDURE — 99499 NO LOS: ICD-10-PCS | Mod: S$GLB,,, | Performed by: OTOLARYNGOLOGY

## 2022-05-30 PROCEDURE — 85610 PROTHROMBIN TIME: CPT | Performed by: INTERNAL MEDICINE

## 2022-05-30 PROCEDURE — 99999 PR PBB SHADOW E&M-EST. PATIENT-LVL V: CPT | Mod: PBBFAC,,, | Performed by: INTERNAL MEDICINE

## 2022-05-30 PROCEDURE — 1126F PR PAIN SEVERITY QUANTIFIED, NO PAIN PRESENT: ICD-10-PCS | Mod: CPTII,S$GLB,, | Performed by: OTOLARYNGOLOGY

## 2022-05-30 PROCEDURE — 3288F FALL RISK ASSESSMENT DOCD: CPT | Mod: CPTII,S$GLB,, | Performed by: INTERNAL MEDICINE

## 2022-05-30 PROCEDURE — 1101F PT FALLS ASSESS-DOCD LE1/YR: CPT | Mod: CPTII,S$GLB,, | Performed by: INTERNAL MEDICINE

## 2022-05-30 PROCEDURE — 3288F PR FALLS RISK ASSESSMENT DOCUMENTED: ICD-10-PCS | Mod: CPTII,S$GLB,, | Performed by: OTOLARYNGOLOGY

## 2022-05-30 PROCEDURE — 36415 COLL VENOUS BLD VENIPUNCTURE: CPT | Mod: PO | Performed by: INTERNAL MEDICINE

## 2022-05-30 PROCEDURE — 3288F PR FALLS RISK ASSESSMENT DOCUMENTED: ICD-10-PCS | Mod: CPTII,S$GLB,, | Performed by: INTERNAL MEDICINE

## 2022-05-30 PROCEDURE — 1160F PR REVIEW ALL MEDS BY PRESCRIBER/CLIN PHARMACIST DOCUMENTED: ICD-10-PCS | Mod: CPTII,S$GLB,, | Performed by: INTERNAL MEDICINE

## 2022-05-30 PROCEDURE — 85025 COMPLETE CBC W/AUTO DIFF WBC: CPT | Performed by: INTERNAL MEDICINE

## 2022-05-30 PROCEDURE — 1126F AMNT PAIN NOTED NONE PRSNT: CPT | Mod: CPTII,S$GLB,, | Performed by: OTOLARYNGOLOGY

## 2022-05-30 PROCEDURE — 3078F PR MOST RECENT DIASTOLIC BLOOD PRESSURE < 80 MM HG: ICD-10-PCS | Mod: CPTII,S$GLB,, | Performed by: INTERNAL MEDICINE

## 2022-05-30 PROCEDURE — 3078F DIAST BP <80 MM HG: CPT | Mod: CPTII,S$GLB,, | Performed by: INTERNAL MEDICINE

## 2022-05-30 PROCEDURE — 3288F FALL RISK ASSESSMENT DOCD: CPT | Mod: CPTII,S$GLB,, | Performed by: OTOLARYNGOLOGY

## 2022-05-30 PROCEDURE — 1126F PR PAIN SEVERITY QUANTIFIED, NO PAIN PRESENT: ICD-10-PCS | Mod: CPTII,S$GLB,, | Performed by: INTERNAL MEDICINE

## 2022-05-30 PROCEDURE — 1157F ADVNC CARE PLAN IN RCRD: CPT | Mod: CPTII,S$GLB,, | Performed by: INTERNAL MEDICINE

## 2022-05-30 PROCEDURE — 3074F SYST BP LT 130 MM HG: CPT | Mod: CPTII,S$GLB,, | Performed by: INTERNAL MEDICINE

## 2022-05-30 PROCEDURE — 1157F PR ADVANCE CARE PLAN OR EQUIV PRESENT IN MEDICAL RECORD: ICD-10-PCS | Mod: CPTII,S$GLB,, | Performed by: OTOLARYNGOLOGY

## 2022-05-30 PROCEDURE — 3074F PR MOST RECENT SYSTOLIC BLOOD PRESSURE < 130 MM HG: ICD-10-PCS | Mod: CPTII,S$GLB,, | Performed by: OTOLARYNGOLOGY

## 2022-05-30 PROCEDURE — 1101F PT FALLS ASSESS-DOCD LE1/YR: CPT | Mod: CPTII,S$GLB,, | Performed by: OTOLARYNGOLOGY

## 2022-05-30 PROCEDURE — 3072F LOW RISK FOR RETINOPATHY: CPT | Mod: CPTII,S$GLB,, | Performed by: OTOLARYNGOLOGY

## 2022-05-30 PROCEDURE — 3074F SYST BP LT 130 MM HG: CPT | Mod: CPTII,S$GLB,, | Performed by: OTOLARYNGOLOGY

## 2022-05-30 PROCEDURE — 1157F ADVNC CARE PLAN IN RCRD: CPT | Mod: CPTII,S$GLB,, | Performed by: OTOLARYNGOLOGY

## 2022-05-30 PROCEDURE — 1159F MED LIST DOCD IN RCRD: CPT | Mod: CPTII,S$GLB,, | Performed by: OTOLARYNGOLOGY

## 2022-05-30 PROCEDURE — 3072F PR LOW RISK FOR RETINOPATHY: ICD-10-PCS | Mod: CPTII,S$GLB,, | Performed by: OTOLARYNGOLOGY

## 2022-05-30 PROCEDURE — 1160F RVW MEDS BY RX/DR IN RCRD: CPT | Mod: CPTII,S$GLB,, | Performed by: INTERNAL MEDICINE

## 2022-05-30 PROCEDURE — 99214 OFFICE O/P EST MOD 30 MIN: CPT | Mod: S$GLB,,, | Performed by: INTERNAL MEDICINE

## 2022-05-30 PROCEDURE — 3078F DIAST BP <80 MM HG: CPT | Mod: CPTII,S$GLB,, | Performed by: OTOLARYNGOLOGY

## 2022-05-30 PROCEDURE — 1101F PR PT FALLS ASSESS DOC 0-1 FALLS W/OUT INJ PAST YR: ICD-10-PCS | Mod: CPTII,S$GLB,, | Performed by: INTERNAL MEDICINE

## 2022-05-30 PROCEDURE — 99999 PR PBB SHADOW E&M-EST. PATIENT-LVL V: ICD-10-PCS | Mod: PBBFAC,,, | Performed by: INTERNAL MEDICINE

## 2022-05-30 PROCEDURE — 1101F PR PT FALLS ASSESS DOC 0-1 FALLS W/OUT INJ PAST YR: ICD-10-PCS | Mod: CPTII,S$GLB,, | Performed by: OTOLARYNGOLOGY

## 2022-05-30 PROCEDURE — 3078F PR MOST RECENT DIASTOLIC BLOOD PRESSURE < 80 MM HG: ICD-10-PCS | Mod: CPTII,S$GLB,, | Performed by: OTOLARYNGOLOGY

## 2022-05-30 PROCEDURE — 1159F MED LIST DOCD IN RCRD: CPT | Mod: CPTII,S$GLB,, | Performed by: INTERNAL MEDICINE

## 2022-05-30 PROCEDURE — 80053 COMPREHEN METABOLIC PANEL: CPT | Performed by: INTERNAL MEDICINE

## 2022-05-30 PROCEDURE — 3074F PR MOST RECENT SYSTOLIC BLOOD PRESSURE < 130 MM HG: ICD-10-PCS | Mod: CPTII,S$GLB,, | Performed by: INTERNAL MEDICINE

## 2022-05-30 PROCEDURE — 69210 PR REMOVAL IMPACTED CERUMEN REQUIRING INSTRUMENTATION, UNILATERAL: ICD-10-PCS | Mod: S$GLB,,, | Performed by: OTOLARYNGOLOGY

## 2022-05-30 PROCEDURE — 99999 PR PBB SHADOW E&M-EST. PATIENT-LVL IV: CPT | Mod: PBBFAC,,, | Performed by: OTOLARYNGOLOGY

## 2022-05-30 PROCEDURE — 3072F LOW RISK FOR RETINOPATHY: CPT | Mod: CPTII,S$GLB,, | Performed by: INTERNAL MEDICINE

## 2022-05-30 PROCEDURE — 3072F PR LOW RISK FOR RETINOPATHY: ICD-10-PCS | Mod: CPTII,S$GLB,, | Performed by: INTERNAL MEDICINE

## 2022-05-30 PROCEDURE — 99214 PR OFFICE/OUTPT VISIT, EST, LEVL IV, 30-39 MIN: ICD-10-PCS | Mod: S$GLB,,, | Performed by: INTERNAL MEDICINE

## 2022-05-30 PROCEDURE — 1126F AMNT PAIN NOTED NONE PRSNT: CPT | Mod: CPTII,S$GLB,, | Performed by: INTERNAL MEDICINE

## 2022-05-30 PROCEDURE — 69210 REMOVE IMPACTED EAR WAX UNI: CPT | Mod: S$GLB,,, | Performed by: OTOLARYNGOLOGY

## 2022-05-30 PROCEDURE — 1159F PR MEDICATION LIST DOCUMENTED IN MEDICAL RECORD: ICD-10-PCS | Mod: CPTII,S$GLB,, | Performed by: INTERNAL MEDICINE

## 2022-05-30 NOTE — PROGRESS NOTES
REFERRING PROVIDER  Anna Tomas Md  [id number] [place] [address]  Subjective:   Patient: Erendira Pretty [id number], :1945   Visit date:2022 12:30 PM    Chief Complaint:  Cerumen Impaction (bilateral)        HPI:     Erendira Pretty is a 76 y.o. male whom I am asked to see for evaluation of otalgia or hearing loss in both ears for the past 1-4 weeks.   Erendira rates the severity as moderate.  No exacerbating or relieving factors.  There is no drainage from the ears.      His meds, allergies, medical, surgical, social & family histories were reviewed & updated:  -     He has a current medication list which includes the following prescription(s): accu-chek lux plus meter, albuterol, amlodipine, aspirin, azelastine, blood sugar diagnostic, budesonide-formoterol 160-4.5 mcg, carvedilol, jardiance, finasteride, fluticasone propionate, furosemide, glucosamine/chondr leach a sod, krill oil, lancets, lantus solostar u-100 insulin, levocetirizine, levothyroxine, lisinopril, metformin, montelukast, pen needle, diabetic, rabeprazole, and sildenafil, and the following Facility-Administered Medications: lactated ringers.  -     He  has a past medical history of Aortic stenosis, Asthma, Benign prostatic hyperplasia with nocturia, Bilateral carotid artery stenosis (2021), BPH (benign prostatic hyperplasia), CAD (coronary artery disease), Cirrhosis, Claudication (2014), Colon polyp, COPD (chronic obstructive pulmonary disease), ED (erectile dysfunction), Encounter for blood transfusion, Ex-smoker, Hearing loss NEC, Hepatitis C, Hyperlipidemia, Hypertension, Hypothyroid, Open angle with borderline findings and low glaucoma risk in both eyes (2020), DELONTE on CPAP, Osteoarthritis, PVD (peripheral vascular disease), Secondary esophageal varices without bleeding (2017), and Type 2 diabetes mellitus.   -     He does not have any pertinent problems on file.   -     He  has a past surgical history that  includes Knee surgery (Right); Trigger finger release (Left); Carpal tunnel release (Left); Elbow bursa surgery (Right, 2010); Rectal surgery (2012); Eye surgery; Cataract extraction w/ intraocular lens  implant, bilateral (2008); Cardiac catheterization; Colonoscopy (8/2013); Colonoscopy (N/A, 5/30/2016); Transurethral resection of prostate (TURP) without use of laser (N/A, 6/19/2018); Coronary Artery Bypass Graft (CABG) (N/A, 11/5/2018); Endoscopic harvest of vein (Left, 11/5/2018); Cardiac surgery; Catheterization of both left and right heart (N/A, 11/2/2018); Functional endoscopic sinus surgery (FESS) (Bilateral, 3/27/2019); Frontal sinus obliteration (Bilateral, 3/27/2019); Maxillary antrostomy (Bilateral, 3/27/2019); Ethmoidectomy (Bilateral, 3/27/2019); Nasal septoplasty (N/A, 3/27/2019); Knee arthroscopy w/ meniscal repair (Left, 06/2019); Esophagogastroduodenoscopy (N/A, 7/17/2019); Colonoscopy (N/A, 7/17/2019); Computed tomography (N/A, 9/9/2019); Cataract extraction; Left heart catheterization (Left, 3/2/2020); Coronary bypass graft angiography (3/2/2020); Foot surgery; Computed tomography (N/A, 1/25/2022); and Liver biopsy (01/2022).  -     He  reports that he quit smoking about 49 years ago. He has a 2.00 pack-year smoking history. He quit smokeless tobacco use about 45 years ago.  His smokeless tobacco use included chew. He reports current alcohol use of about 2.0 standard drinks of alcohol per week. He reports that he does not use drugs.  -     His family history includes Heart disease in his brother, father, and mother.  -     He is allergic to lipitor [atorvastatin], niacin preparations, statins-hmg-coa reductase inhibitors, iodinated contrast media, omeprazole, and prilosec [omeprazole magnesium].      Review of Systems:  -     Allergic/Immunologic: is allergic to lipitor [atorvastatin], niacin preparations, statins-hmg-coa reductase inhibitors, iodinated contrast media, omeprazole, and prilosec  [omeprazole magnesium]..  -     Constitutional: Current temp: 98.2 °F (36.8 °C) (Temporal)        Objective:     Physical Exam:  Vitals:  /64   Pulse 70   Temp 98.2 °F (36.8 °C) (Temporal)   Wt 114 kg (251 lb 5.2 oz)   BMI 38.21 kg/m²   Communication:  Able to communicate, no hoarseness.  Head & Face:  Normocephalic, atraumatic, no sinus tenderness.  Eyes:  Extraocular motions intact.  Ears:  Otoscopy of external auditory canals reveals impaction of bilateral ear canals.  With the patient in the supine position, we used the operating microscope to examine both ears with the appropriate sized ear speculum.  A variety of sterile, micro-instruments were utilized to remove the cerumen atraumatically from the impacted ear(s).   After removal, the ears were reexamined-  Right Ear:  No mass/lesion of auricle. The external auditory canals is without erythema or discharge. Pneumatic otoscopy of the tympanic membrane revealed no perforation and good mobility, with no fluid in middle ear. Clinical speech reception thresholds grossly normal.  Left Ear:  No mass/lesion of auricle. The external auditory canals is without erythema or discharge. Pneumatic otoscopy of the tympanic membrane revealed no perforation and good mobility, with no fluid in middle ear. Clinical speech reception thresholds grossly normal  Nose:  No masses/lesions of external nose, nasal mucosa, septum, and turbinates were within normal limits.  Mouth:  No mass/lesion of lips, teeth, gums, hard/soft palate, tongue, tonsils, or oropharynx.  Neck & Lymphatics:  No cervical lymphadenopathy, no neck mass/crepitus/ asymmetry, trachea is midline, no thyroid enlargement/tenderness/mass.  Neuro/Psych: Alert with normal mood and affect.   Respiration/Chest:  Symmetric expansion during respiration, normal respiratory effort.  Skin:  Warm and intact.    Assessment & Plan:       -     Cerumen Impaction - Erendira has cerumen impaction.  We discussed preventative  measures and treatment options.  Q-tips must be avoided, instead the ears can be cleaned with OTC ear rinses (or a mixture of alcohol & vinegar in equal parts).   For hard wax, Erendira may place mineral oil/baby oil in the ear with a cotton ball at night and remove in the shower.  This will assist in softening the wax and allow it to drain out on its own. If the cerumen impacts the ear canal and causes hearing loss or infection he needs to follow-up in the clinic for treatment and cleaning.

## 2022-05-30 NOTE — PROGRESS NOTES
Subjective:     Erendira Pretty is here for follow up of cirrhosis    HPI  Since Erendira Pretty's last visit there have been no significant issues.  Overall feels well.  His only concern is that he has wax in his ear.  Reports a nurse said that it was blocking his eardrum.  He has noticed that his hearing is little bit less and he is slightly more ringing in his ear when his wax buildup.  He has had this issue before in needed cleaned out with ENT.  He thinks he needs it again.    No evidence of liver decompensation: no ascites, confusion or GI bleeding.    HCC s/p ablation 9/2019, 1/2022    Review of Systems   Constitutional: Negative.    HENT: Positive for hearing loss (Slight ringing).    Eyes: Negative.    Respiratory: Negative.    Cardiovascular: Positive for leg swelling.   Gastrointestinal: Negative.    Genitourinary: Negative.    Musculoskeletal: Negative.    Skin: Negative.    Neurological: Negative.    Psychiatric/Behavioral: Negative.        Objective:     Physical Exam  Vitals reviewed.   Constitutional:       General: He is not in acute distress.     Appearance: He is well-developed.   HENT:      Head: Normocephalic and atraumatic.      Mouth/Throat:      Pharynx: No oropharyngeal exudate.   Eyes:      General: No scleral icterus.        Right eye: No discharge.         Left eye: No discharge.      Conjunctiva/sclera: Conjunctivae normal.      Pupils: Pupils are equal, round, and reactive to light.   Pulmonary:      Effort: Pulmonary effort is normal. No respiratory distress.      Breath sounds: Normal breath sounds. No wheezing.   Abdominal:      General: There is no distension.      Palpations: Abdomen is soft.      Tenderness: There is no abdominal tenderness.   Musculoskeletal:      Right lower leg: Edema ( trace) present.      Left lower leg: Edema (Trace) present.   Neurological:      Mental Status: He is alert and oriented to person, place, and time.   Psychiatric:         Behavior: Behavior  normal.         MELD-Na score: 9 at 4/4/2022  7:00 AM  MELD score: 9 at 4/4/2022  7:00 AM  Calculated from:  Serum Creatinine: 1.3 mg/dL at 4/4/2022  7:00 AM  Serum Sodium: 137 mmol/L at 4/4/2022  7:00 AM  Total Bilirubin: 0.6 mg/dL (Using min of 1 mg/dL) at 4/4/2022  7:00 AM  INR(ratio): 1.0 at 4/4/2022  7:00 AM  Age: 76 years    WBC   Date Value Ref Range Status   04/04/2022 3.19 (L) 3.90 - 12.70 K/uL Final     Hemoglobin   Date Value Ref Range Status   04/04/2022 13.8 (L) 14.0 - 18.0 g/dL Final     POC Hematocrit   Date Value Ref Range Status   11/05/2018 26 (L) 36 - 54 %PCV Final     Hematocrit   Date Value Ref Range Status   04/04/2022 40.2 40.0 - 54.0 % Final     Platelets   Date Value Ref Range Status   04/04/2022 144 (L) 150 - 450 K/uL Final     BUN   Date Value Ref Range Status   04/04/2022 19 8 - 23 mg/dL Final     Creatinine   Date Value Ref Range Status   04/04/2022 1.3 0.5 - 1.4 mg/dL Final     Glucose   Date Value Ref Range Status   04/04/2022 135 (H) 70 - 110 mg/dL Final     Calcium   Date Value Ref Range Status   04/04/2022 9.5 8.7 - 10.5 mg/dL Final     Sodium   Date Value Ref Range Status   04/04/2022 137 136 - 145 mmol/L Final     Potassium   Date Value Ref Range Status   04/04/2022 4.3 3.5 - 5.1 mmol/L Final     Chloride   Date Value Ref Range Status   04/04/2022 101 95 - 110 mmol/L Final     Magnesium   Date Value Ref Range Status   11/08/2018 2.1 1.6 - 2.6 mg/dL Final     AST   Date Value Ref Range Status   04/04/2022 34 10 - 40 U/L Final     ALT   Date Value Ref Range Status   04/04/2022 27 10 - 44 U/L Final     Alkaline Phosphatase   Date Value Ref Range Status   04/04/2022 66 55 - 135 U/L Final     Total Bilirubin   Date Value Ref Range Status   04/04/2022 0.6 0.1 - 1.0 mg/dL Final     Comment:     For infants and newborns, interpretation of results should be based  on gestational age, weight and in agreement with clinical  observations.    Premature Infant recommended reference ranges:  Up  to 24 hours.............<8.0 mg/dL  Up to 48 hours............<12.0 mg/dL  3-5 days..................<15.0 mg/dL  6-29 days.................<15.0 mg/dL       Albumin   Date Value Ref Range Status   04/04/2022 3.9 3.5 - 5.2 g/dL Final     INR   Date Value Ref Range Status   04/04/2022 1.0 0.8 - 1.2 Final     Comment:     Coumadin Therapy:  2.0 - 3.0 for INR for all indicators except mechanical heart valves  and antiphospholipid syndromes which should use 2.5 - 3.5.           Assessment/Plan:     1. HCC (hepatocellular carcinoma)    2. Other cirrhosis of liver    3. Bilateral impacted cerumen      Erendira Pretty is a 76 y.o. male withCirrhosis      HCC (hepatocellular carcinoma)-developed a recurrence this year and underwent ablation; follow-up imaging shows no residual cancer.  -repeat imaging in 3 months with labs  -     AFP Tumor Marker; Future; Expected date: 05/30/2022  -     MRI Abdomen W WO Contrast; Future; Expected date: 05/30/2022    Other cirrhosis of liver-low meld, well compensated  -Continue to monitor MELD labs  -Continue with HCC surveillance  -Continue variceal screening-on a beta-blocker  -     Comprehensive Metabolic Panel; Future; Expected date: 05/30/2022  -     CBC Auto Differential; Future; Expected date: 05/30/2022  -     AFP Tumor Marker; Future; Expected date: 05/30/2022  -     Protime-INR; Future; Expected date: 05/30/2022  -     MRI Abdomen W WO Contrast; Future; Expected date: 05/30/2022    Bilateral impacted cerumen  -refer to ENT for evaluation and treatment  -     Ambulatory referral/consult to ENT; Future; Expected date: 06/06/2022    RTC in 3 months with preclinic labs and imaging    Anna Tomas MD

## 2022-05-30 NOTE — TELEPHONE ENCOUNTER
----- Message from Maxine Patel MA sent at 5/30/2022  9:56 AM CDT -----  Regarding: new patient appointment  Good morning,    Per Dr. Anna Tomas, can someone reach out to this patient to get him scheduled with one of your providers asap? Referral has been placed in chart. Thanks in advance.

## 2022-05-31 ENCOUNTER — PATIENT MESSAGE (OUTPATIENT)
Dept: HEPATOLOGY | Facility: CLINIC | Age: 77
End: 2022-05-31
Payer: MEDICARE

## 2022-06-27 ENCOUNTER — OFFICE VISIT (OUTPATIENT)
Dept: INTERNAL MEDICINE | Facility: CLINIC | Age: 77
End: 2022-06-27
Payer: MEDICARE

## 2022-06-27 VITALS
DIASTOLIC BLOOD PRESSURE: 70 MMHG | SYSTOLIC BLOOD PRESSURE: 122 MMHG | WEIGHT: 244.5 LBS | TEMPERATURE: 98 F | HEIGHT: 68 IN | OXYGEN SATURATION: 96 % | HEART RATE: 63 BPM | BODY MASS INDEX: 37.05 KG/M2

## 2022-06-27 DIAGNOSIS — I48.0 PAROXYSMAL ATRIAL FIBRILLATION: ICD-10-CM

## 2022-06-27 DIAGNOSIS — I25.10 CORONARY ARTERY DISEASE INVOLVING NATIVE CORONARY ARTERY OF NATIVE HEART WITHOUT ANGINA PECTORIS: Chronic | ICD-10-CM

## 2022-06-27 DIAGNOSIS — R35.1 BENIGN PROSTATIC HYPERPLASIA WITH NOCTURIA: Chronic | ICD-10-CM

## 2022-06-27 DIAGNOSIS — I70.0 AORTIC ATHEROSCLEROSIS: ICD-10-CM

## 2022-06-27 DIAGNOSIS — K21.9 GASTROESOPHAGEAL REFLUX DISEASE, UNSPECIFIED WHETHER ESOPHAGITIS PRESENT: ICD-10-CM

## 2022-06-27 DIAGNOSIS — Z79.4 TYPE 2 DIABETES MELLITUS WITH CHRONIC KIDNEY DISEASE, WITH LONG-TERM CURRENT USE OF INSULIN, UNSPECIFIED CKD STAGE: Chronic | ICD-10-CM

## 2022-06-27 DIAGNOSIS — Z78.9 STATIN INTOLERANCE: Chronic | ICD-10-CM

## 2022-06-27 DIAGNOSIS — N40.1 BENIGN PROSTATIC HYPERPLASIA WITH NOCTURIA: Chronic | ICD-10-CM

## 2022-06-27 DIAGNOSIS — I65.23 BILATERAL CAROTID ARTERY STENOSIS: Chronic | ICD-10-CM

## 2022-06-27 DIAGNOSIS — Z77.090 ASBESTOS EXPOSURE: ICD-10-CM

## 2022-06-27 DIAGNOSIS — Z95.1 S/P CABG X 2: ICD-10-CM

## 2022-06-27 DIAGNOSIS — J45.20 MILD INTERMITTENT ASTHMA WITHOUT COMPLICATION: ICD-10-CM

## 2022-06-27 DIAGNOSIS — D12.6 ADENOMATOUS POLYP OF COLON, UNSPECIFIED PART OF COLON: ICD-10-CM

## 2022-06-27 DIAGNOSIS — E03.9 ACQUIRED HYPOTHYROIDISM: Primary | Chronic | ICD-10-CM

## 2022-06-27 DIAGNOSIS — C22.0 HCC (HEPATOCELLULAR CARCINOMA): ICD-10-CM

## 2022-06-27 DIAGNOSIS — I35.0 NONRHEUMATIC AORTIC VALVE STENOSIS: Chronic | ICD-10-CM

## 2022-06-27 DIAGNOSIS — E11.22 TYPE 2 DIABETES MELLITUS WITH CHRONIC KIDNEY DISEASE, WITH LONG-TERM CURRENT USE OF INSULIN, UNSPECIFIED CKD STAGE: Chronic | ICD-10-CM

## 2022-06-27 DIAGNOSIS — E78.2 MIXED HYPERLIPIDEMIA: ICD-10-CM

## 2022-06-27 DIAGNOSIS — E66.01 CLASS 2 SEVERE OBESITY DUE TO EXCESS CALORIES WITH SERIOUS COMORBIDITY AND BODY MASS INDEX (BMI) OF 37.0 TO 37.9 IN ADULT: ICD-10-CM

## 2022-06-27 PROCEDURE — 3051F HG A1C>EQUAL 7.0%<8.0%: CPT | Mod: CPTII,S$GLB,, | Performed by: PEDIATRICS

## 2022-06-27 PROCEDURE — 3074F SYST BP LT 130 MM HG: CPT | Mod: CPTII,S$GLB,, | Performed by: PEDIATRICS

## 2022-06-27 PROCEDURE — 1160F PR REVIEW ALL MEDS BY PRESCRIBER/CLIN PHARMACIST DOCUMENTED: ICD-10-PCS | Mod: CPTII,S$GLB,, | Performed by: PEDIATRICS

## 2022-06-27 PROCEDURE — 99214 OFFICE O/P EST MOD 30 MIN: CPT | Mod: S$GLB,,, | Performed by: PEDIATRICS

## 2022-06-27 PROCEDURE — 1101F PR PT FALLS ASSESS DOC 0-1 FALLS W/OUT INJ PAST YR: ICD-10-PCS | Mod: CPTII,S$GLB,, | Performed by: PEDIATRICS

## 2022-06-27 PROCEDURE — 1126F PR PAIN SEVERITY QUANTIFIED, NO PAIN PRESENT: ICD-10-PCS | Mod: CPTII,S$GLB,, | Performed by: PEDIATRICS

## 2022-06-27 PROCEDURE — 1101F PT FALLS ASSESS-DOCD LE1/YR: CPT | Mod: CPTII,S$GLB,, | Performed by: PEDIATRICS

## 2022-06-27 PROCEDURE — 99999 PR PBB SHADOW E&M-EST. PATIENT-LVL III: ICD-10-PCS | Mod: PBBFAC,,, | Performed by: PEDIATRICS

## 2022-06-27 PROCEDURE — 3288F FALL RISK ASSESSMENT DOCD: CPT | Mod: CPTII,S$GLB,, | Performed by: PEDIATRICS

## 2022-06-27 PROCEDURE — 99999 PR PBB SHADOW E&M-EST. PATIENT-LVL III: CPT | Mod: PBBFAC,,, | Performed by: PEDIATRICS

## 2022-06-27 PROCEDURE — 3078F PR MOST RECENT DIASTOLIC BLOOD PRESSURE < 80 MM HG: ICD-10-PCS | Mod: CPTII,S$GLB,, | Performed by: PEDIATRICS

## 2022-06-27 PROCEDURE — 3078F DIAST BP <80 MM HG: CPT | Mod: CPTII,S$GLB,, | Performed by: PEDIATRICS

## 2022-06-27 PROCEDURE — 3288F PR FALLS RISK ASSESSMENT DOCUMENTED: ICD-10-PCS | Mod: CPTII,S$GLB,, | Performed by: PEDIATRICS

## 2022-06-27 PROCEDURE — 1157F ADVNC CARE PLAN IN RCRD: CPT | Mod: CPTII,S$GLB,, | Performed by: PEDIATRICS

## 2022-06-27 PROCEDURE — 3074F PR MOST RECENT SYSTOLIC BLOOD PRESSURE < 130 MM HG: ICD-10-PCS | Mod: CPTII,S$GLB,, | Performed by: PEDIATRICS

## 2022-06-27 PROCEDURE — 1159F PR MEDICATION LIST DOCUMENTED IN MEDICAL RECORD: ICD-10-PCS | Mod: CPTII,S$GLB,, | Performed by: PEDIATRICS

## 2022-06-27 PROCEDURE — 3072F LOW RISK FOR RETINOPATHY: CPT | Mod: CPTII,S$GLB,, | Performed by: PEDIATRICS

## 2022-06-27 PROCEDURE — 1160F RVW MEDS BY RX/DR IN RCRD: CPT | Mod: CPTII,S$GLB,, | Performed by: PEDIATRICS

## 2022-06-27 PROCEDURE — 3072F PR LOW RISK FOR RETINOPATHY: ICD-10-PCS | Mod: CPTII,S$GLB,, | Performed by: PEDIATRICS

## 2022-06-27 PROCEDURE — 1159F MED LIST DOCD IN RCRD: CPT | Mod: CPTII,S$GLB,, | Performed by: PEDIATRICS

## 2022-06-27 PROCEDURE — 3051F PR MOST RECENT HEMOGLOBIN A1C LEVEL 7.0 - < 8.0%: ICD-10-PCS | Mod: CPTII,S$GLB,, | Performed by: PEDIATRICS

## 2022-06-27 PROCEDURE — 1157F PR ADVANCE CARE PLAN OR EQUIV PRESENT IN MEDICAL RECORD: ICD-10-PCS | Mod: CPTII,S$GLB,, | Performed by: PEDIATRICS

## 2022-06-27 PROCEDURE — 1126F AMNT PAIN NOTED NONE PRSNT: CPT | Mod: CPTII,S$GLB,, | Performed by: PEDIATRICS

## 2022-06-27 PROCEDURE — 99214 PR OFFICE/OUTPT VISIT, EST, LEVL IV, 30-39 MIN: ICD-10-PCS | Mod: S$GLB,,, | Performed by: PEDIATRICS

## 2022-06-27 NOTE — PROGRESS NOTES
Subjective:       Patient ID: Erendira Pretty is a 77 y.o. male.    Chief Complaint: 6 month follow up     Erendira Pretty is a 77 y.o. male who presents to clinic for a follow up    1) DM: no hyper/hypoglycemic symptoms. Infrequent checks at home, taking Jardiance 10mg, 44u Lantus, and Metformin BID.   2) HTN: rare BP checks, B/P good, no HTNive symptoms. Switched from Metoprolol to Coreg for esophogeal varicies  3) LIPIDS: following D&E, statin intolerant. stopped Zetia and Fenofibrate by Cards due to fatigue and myalgia  4) CAD/Atherosclerosis: no CP, active and sees Dr. Gallardo  5) Hypothyroid: asymptomatic, no swallowing difficulties, no hyper/hypothyroid symptoms. Compliant with levothyroxine 112mcg.  6) Hepatocellular carcinoma: followed by Dr. Tomas  7) Asthma/DELONTE/asbestos/obesity: followed by Pulmonary    LABS NEEDED     Review of Systems   Constitutional: Negative for activity change, appetite change, chills, diaphoresis, fatigue, fever and unexpected weight change.   HENT: Negative for nasal congestion, ear pain, mouth sores, nosebleeds, postnasal drip, rhinorrhea, sneezing and sore throat.    Eyes: Negative for photophobia, pain, discharge, redness and visual disturbance.   Respiratory: Negative for cough, chest tightness, shortness of breath, wheezing and stridor.    Cardiovascular: Negative for chest pain, palpitations and leg swelling.   Gastrointestinal: Negative for abdominal distention, blood in stool, constipation, diarrhea, nausea and vomiting.   Genitourinary: Negative for decreased urine volume, difficulty urinating, dysuria, flank pain, frequency, genital sores, hematuria and urgency.   Musculoskeletal: Negative for arthralgias, back pain, joint swelling, neck pain and neck stiffness.   Integumentary:  Negative for color change, pallor, rash and wound.   Neurological: Negative for dizziness, syncope, speech difficulty, weakness, light-headedness and headaches.   Hematological: Negative for  adenopathy. Does not bruise/bleed easily.   Psychiatric/Behavioral: Negative for confusion, decreased concentration, dysphoric mood, hallucinations, sleep disturbance and suicidal ideas. The patient is not nervous/anxious.    All other systems reviewed and are negative.        Objective:      Physical Exam  Vitals and nursing note reviewed.   Constitutional:       General: He is not in acute distress.     Appearance: He is well-developed.   Neck:      Thyroid: No thyromegaly.      Vascular: No JVD.   Cardiovascular:      Rate and Rhythm: Normal rate and regular rhythm.      Heart sounds: Normal heart sounds. No murmur heard.  Pulmonary:      Effort: Pulmonary effort is normal. No respiratory distress.      Breath sounds: Normal breath sounds. No wheezing or rales.   Abdominal:      General: There is no distension.      Palpations: Abdomen is soft. There is no mass.      Tenderness: There is no abdominal tenderness. There is no guarding.   Musculoskeletal:      Right lower leg: No edema.      Left lower leg: No edema.   Feet:      Comments: Foot hygiene was good, no ulcers, no onychomycosis, no tinea, monofilament intact   Lymphadenopathy:      Cervical: No cervical adenopathy.   Skin:     Capillary Refill: Capillary refill takes less than 2 seconds.      Findings: No rash.   Neurological:      General: No focal deficit present.      Mental Status: He is alert and oriented to person, place, and time.      Cranial Nerves: No cranial nerve deficit.      Coordination: Coordination normal.   Psychiatric:         Mood and Affect: Mood normal.         Behavior: Behavior normal.         Thought Content: Thought content normal.         Judgment: Judgment normal.         Assessment:       Problem List Items Addressed This Visit     Type 2 diabetes mellitus with kidney complication, with long-term current use of insulin (Chronic)    Relevant Orders    Lipid Panel    Lipid Panel    Comprehensive Metabolic Panel    Hemoglobin  A1C    Microalbumin/Creatinine Ratio, Urine    Acquired hypothyroidism - Primary (Chronic)    Relevant Orders    TSH    Coronary artery disease involving native coronary artery of native heart without angina pectoris (Chronic)    Benign prostatic hyperplasia with nocturia (Chronic)    Aortic stenosis mild (Chronic)    Bilateral carotid artery stenosis (Chronic)    Statin intolerance (Chronic)    Class 2 severe obesity due to excess calories with serious comorbidity and body mass index (BMI) of 37.0 to 37.9 in adult    Mild intermittent asthma without complication    Colon polyps    Asbestos exposure    Mixed hyperlipidemia    S/P CABG x 2    GERD (gastroesophageal reflux disease)    HCC (hepatocellular carcinoma)    Aortic atherosclerosis    Paroxysmal atrial fibrillation          Plan:     Acquired hypothyroidism  -     TSH; Future; Expected date: 06/27/2022    Aortic atherosclerosis    Nonrheumatic aortic valve stenosis    Asbestos exposure    Benign prostatic hyperplasia with nocturia    Bilateral carotid artery stenosis    Class 2 severe obesity due to excess calories with serious comorbidity and body mass index (BMI) of 37.0 to 37.9 in adult    Adenomatous polyp of colon, unspecified part of colon    Coronary artery disease involving native coronary artery of native heart without angina pectoris    Gastroesophageal reflux disease, unspecified whether esophagitis present    HCC (hepatocellular carcinoma)    Mild intermittent asthma without complication    Mixed hyperlipidemia    Paroxysmal atrial fibrillation    S/P CABG x 2    Statin intolerance    Type 2 diabetes mellitus with chronic kidney disease, with long-term current use of insulin, unspecified CKD stage  -     Lipid Panel; Future; Expected date: 06/27/2022  -     Lipid Panel; Future; Expected date: 06/27/2022  -     Comprehensive Metabolic Panel; Future; Expected date: 06/27/2022  -     Hemoglobin A1C; Future; Expected date: 06/27/2022  -      Microalbumin/Creatinine Ratio, Urine; Future; Expected date: 06/27/2022    B/P, GERD, asthma under control. Cardiac, liver actively being addressed by subspecialty. DM and thyroid seem to be under control but await labs. Currently off lipid treatment due to side effects, get lipids today to be addressed by cardiology again next month- repatha use initially d/w patient. F/U 6 months with labs. Covid#4 d/w pt.  Scribe Attestation:   I, Neo Coleman, am scribing for, and in the presence of, Dr. Bhupinder Callaway Jr. I performed the above scribed service and the documentation accurately describes the services I performed. I attest to the accuracy of the note.    I, Dr. Bhupinder Callaway Jr, reviewed documentation as scribed above. I personally performed the services described in this documentation.  I agree that the record reflects my personal performance and is accurate and complete. Bhupinder Callaway Jr., MD.  06/27/2022

## 2022-06-28 ENCOUNTER — LAB VISIT (OUTPATIENT)
Dept: LAB | Facility: HOSPITAL | Age: 77
End: 2022-06-28
Attending: PEDIATRICS
Payer: MEDICARE

## 2022-06-28 DIAGNOSIS — Z12.5 PROSTATE CANCER SCREENING: ICD-10-CM

## 2022-06-28 DIAGNOSIS — E03.9 ACQUIRED HYPOTHYROIDISM: Chronic | ICD-10-CM

## 2022-06-28 DIAGNOSIS — Z79.4 TYPE 2 DIABETES MELLITUS WITH CHRONIC KIDNEY DISEASE, WITH LONG-TERM CURRENT USE OF INSULIN, UNSPECIFIED CKD STAGE: Chronic | ICD-10-CM

## 2022-06-28 DIAGNOSIS — E11.22 TYPE 2 DIABETES MELLITUS WITH CHRONIC KIDNEY DISEASE, WITH LONG-TERM CURRENT USE OF INSULIN, UNSPECIFIED CKD STAGE: Chronic | ICD-10-CM

## 2022-06-28 LAB
ANION GAP SERPL CALC-SCNC: 16 MMOL/L (ref 8–16)
BUN SERPL-MCNC: 19 MG/DL (ref 8–23)
CALCIUM SERPL-MCNC: 9.3 MG/DL (ref 8.7–10.5)
CHLORIDE SERPL-SCNC: 102 MMOL/L (ref 95–110)
CO2 SERPL-SCNC: 22 MMOL/L (ref 23–29)
COMPLEXED PSA SERPL-MCNC: 0.14 NG/ML (ref 0–4)
CREAT SERPL-MCNC: 1 MG/DL (ref 0.5–1.4)
EST. GFR  (AFRICAN AMERICAN): >60 ML/MIN/1.73 M^2
EST. GFR  (NON AFRICAN AMERICAN): >60 ML/MIN/1.73 M^2
ESTIMATED AVG GLUCOSE: 151 MG/DL (ref 68–131)
GLUCOSE SERPL-MCNC: 128 MG/DL (ref 70–110)
HBA1C MFR BLD: 6.9 % (ref 4–5.6)
POTASSIUM SERPL-SCNC: 4.5 MMOL/L (ref 3.5–5.1)
SODIUM SERPL-SCNC: 140 MMOL/L (ref 136–145)
T4 FREE SERPL-MCNC: 0.72 NG/DL (ref 0.71–1.51)
TSH SERPL DL<=0.005 MIU/L-ACNC: 24.76 UIU/ML (ref 0.4–4)
TSH SERPL DL<=0.005 MIU/L-ACNC: 24.76 UIU/ML (ref 0.4–4)

## 2022-06-28 PROCEDURE — 84439 ASSAY OF FREE THYROXINE: CPT | Performed by: PEDIATRICS

## 2022-06-28 PROCEDURE — 84153 ASSAY OF PSA TOTAL: CPT | Performed by: PEDIATRICS

## 2022-06-28 PROCEDURE — 84443 ASSAY THYROID STIM HORMONE: CPT | Performed by: PEDIATRICS

## 2022-06-28 PROCEDURE — 80048 BASIC METABOLIC PNL TOTAL CA: CPT | Performed by: PEDIATRICS

## 2022-06-28 PROCEDURE — 83036 HEMOGLOBIN GLYCOSYLATED A1C: CPT | Performed by: PEDIATRICS

## 2022-06-28 PROCEDURE — 36415 COLL VENOUS BLD VENIPUNCTURE: CPT | Mod: PO | Performed by: PEDIATRICS

## 2022-06-29 DIAGNOSIS — E03.9 ACQUIRED HYPOTHYROIDISM: Chronic | ICD-10-CM

## 2022-06-29 RX ORDER — LEVOTHYROXINE SODIUM 125 UG/1
125 TABLET ORAL
Qty: 90 TABLET | Refills: 3 | Status: SHIPPED | OUTPATIENT
Start: 2022-06-29 | End: 2022-08-30

## 2022-07-12 ENCOUNTER — TELEPHONE (OUTPATIENT)
Dept: INTERNAL MEDICINE | Facility: CLINIC | Age: 77
End: 2022-07-12
Payer: MEDICARE

## 2022-07-12 NOTE — TELEPHONE ENCOUNTER
----- Message from Becki Nixon sent at 7/12/2022 11:05 AM CDT -----  States Dr Callaway changes his medication and it has his stomach messed up. Please call pt [phone] . Thank you

## 2022-07-12 NOTE — TELEPHONE ENCOUNTER
Patient states that medication changed (thyroid) meds. May have caused a bad reaction with his stomach. Patient has been having diarrhea.

## 2022-07-13 ENCOUNTER — OFFICE VISIT (OUTPATIENT)
Dept: OPHTHALMOLOGY | Facility: CLINIC | Age: 77
End: 2022-07-13
Payer: MEDICARE

## 2022-07-13 DIAGNOSIS — H01.02A SQUAMOUS BLEPHARITIS OF UPPER AND LOWER EYELIDS OF BOTH EYES: Primary | ICD-10-CM

## 2022-07-13 DIAGNOSIS — H11.129: ICD-10-CM

## 2022-07-13 DIAGNOSIS — H01.02B SQUAMOUS BLEPHARITIS OF UPPER AND LOWER EYELIDS OF BOTH EYES: Primary | ICD-10-CM

## 2022-07-13 PROCEDURE — 65210 REMOVE FOREIGN BODY FROM EYE: CPT | Mod: RT,S$GLB,, | Performed by: OPTOMETRIST

## 2022-07-13 PROCEDURE — 1157F PR ADVANCE CARE PLAN OR EQUIV PRESENT IN MEDICAL RECORD: ICD-10-PCS | Mod: CPTII,S$GLB,, | Performed by: OPTOMETRIST

## 2022-07-13 PROCEDURE — 1157F ADVNC CARE PLAN IN RCRD: CPT | Mod: CPTII,S$GLB,, | Performed by: OPTOMETRIST

## 2022-07-13 PROCEDURE — 99203 OFFICE O/P NEW LOW 30 MIN: CPT | Mod: 25,S$GLB,, | Performed by: OPTOMETRIST

## 2022-07-13 PROCEDURE — 1159F MED LIST DOCD IN RCRD: CPT | Mod: CPTII,S$GLB,, | Performed by: OPTOMETRIST

## 2022-07-13 PROCEDURE — 99999 PR PBB SHADOW E&M-EST. PATIENT-LVL III: CPT | Mod: PBBFAC,,, | Performed by: OPTOMETRIST

## 2022-07-13 PROCEDURE — 99203 PR OFFICE/OUTPT VISIT, NEW, LEVL III, 30-44 MIN: ICD-10-PCS | Mod: 25,S$GLB,, | Performed by: OPTOMETRIST

## 2022-07-13 PROCEDURE — 1159F PR MEDICATION LIST DOCUMENTED IN MEDICAL RECORD: ICD-10-PCS | Mod: CPTII,S$GLB,, | Performed by: OPTOMETRIST

## 2022-07-13 PROCEDURE — 65210 PR REMV F.B.,EYE,EMBED CONJUNC: ICD-10-PCS | Mod: RT,S$GLB,, | Performed by: OPTOMETRIST

## 2022-07-13 PROCEDURE — 99999 PR PBB SHADOW E&M-EST. PATIENT-LVL III: ICD-10-PCS | Mod: PBBFAC,,, | Performed by: OPTOMETRIST

## 2022-07-13 RX ORDER — MOXIFLOXACIN 5 MG/ML
1 SOLUTION/ DROPS OPHTHALMIC 4 TIMES DAILY
Qty: 3 ML | Refills: 0 | Status: SHIPPED | OUTPATIENT
Start: 2022-07-13 | End: 2022-07-23

## 2022-07-13 NOTE — PROGRESS NOTES
HPI     NP to DKT  Patient here today for FB sensation OD  Patient states symptoms have been present for 2-3 days  Pain rated at 3-4 today    + DIET CONTROLLED DM  MINI ERM OU  DRY EYES  ON RESTASIS  OU LL PLUGS  YAG OS3/21/2022    Last edited by Stacie Romo, PCT on 7/13/2022  1:43 PM. (History)              Assessment /Plan     For exam results, see Encounter Report.    Squamous blepharitis of upper and lower eyelids of both eyes  -     moxifloxacin (VIGAMOX) 0.5 % ophthalmic solution; Place 1 drop into the right eye 4 (four) times daily. for 10 days  Dispense: 3 mL; Refill: 0    Conjunctival concretion of eyelid  Concretions removed behind slit lamp today with jeweler's forceps, patient tolerated procedure well with no complications. Patient reports relief in symptoms. Continue aggressive preservative free tear lubrication, add prophylactic moxifloxacin qid x 7 days.     RTC with Dr Tran as scheduled, sooner if symptoms worsen or fail to resolve.                  
52

## 2022-07-20 ENCOUNTER — OFFICE VISIT (OUTPATIENT)
Dept: OPHTHALMOLOGY | Facility: CLINIC | Age: 77
End: 2022-07-20
Payer: MEDICARE

## 2022-07-20 DIAGNOSIS — H01.02A SQUAMOUS BLEPHARITIS OF UPPER AND LOWER EYELIDS OF BOTH EYES: Primary | ICD-10-CM

## 2022-07-20 DIAGNOSIS — Z79.4 TYPE 2 DIABETES MELLITUS WITH CHRONIC KIDNEY DISEASE, WITH LONG-TERM CURRENT USE OF INSULIN, UNSPECIFIED CKD STAGE: ICD-10-CM

## 2022-07-20 DIAGNOSIS — H01.02B SQUAMOUS BLEPHARITIS OF UPPER AND LOWER EYELIDS OF BOTH EYES: Primary | ICD-10-CM

## 2022-07-20 DIAGNOSIS — H35.373 EPIRETINAL MEMBRANE (ERM) OF BOTH EYES: ICD-10-CM

## 2022-07-20 DIAGNOSIS — E11.22 TYPE 2 DIABETES MELLITUS WITH CHRONIC KIDNEY DISEASE, WITH LONG-TERM CURRENT USE OF INSULIN, UNSPECIFIED CKD STAGE: ICD-10-CM

## 2022-07-20 PROCEDURE — 99999 PR PBB SHADOW E&M-EST. PATIENT-LVL III: CPT | Mod: PBBFAC,,, | Performed by: OPTOMETRIST

## 2022-07-20 PROCEDURE — 1159F MED LIST DOCD IN RCRD: CPT | Mod: CPTII,S$GLB,, | Performed by: OPTOMETRIST

## 2022-07-20 PROCEDURE — 99213 OFFICE O/P EST LOW 20 MIN: CPT | Mod: S$GLB,,, | Performed by: OPTOMETRIST

## 2022-07-20 PROCEDURE — 1157F ADVNC CARE PLAN IN RCRD: CPT | Mod: CPTII,S$GLB,, | Performed by: OPTOMETRIST

## 2022-07-20 PROCEDURE — 1157F PR ADVANCE CARE PLAN OR EQUIV PRESENT IN MEDICAL RECORD: ICD-10-PCS | Mod: CPTII,S$GLB,, | Performed by: OPTOMETRIST

## 2022-07-20 PROCEDURE — 1126F PR PAIN SEVERITY QUANTIFIED, NO PAIN PRESENT: ICD-10-PCS | Mod: CPTII,S$GLB,, | Performed by: OPTOMETRIST

## 2022-07-20 PROCEDURE — 1126F AMNT PAIN NOTED NONE PRSNT: CPT | Mod: CPTII,S$GLB,, | Performed by: OPTOMETRIST

## 2022-07-20 PROCEDURE — 1159F PR MEDICATION LIST DOCUMENTED IN MEDICAL RECORD: ICD-10-PCS | Mod: CPTII,S$GLB,, | Performed by: OPTOMETRIST

## 2022-07-20 PROCEDURE — 99999 PR PBB SHADOW E&M-EST. PATIENT-LVL III: ICD-10-PCS | Mod: PBBFAC,,, | Performed by: OPTOMETRIST

## 2022-07-20 PROCEDURE — 99213 PR OFFICE/OUTPT VISIT, EST, LEVL III, 20-29 MIN: ICD-10-PCS | Mod: S$GLB,,, | Performed by: OPTOMETRIST

## 2022-07-20 NOTE — PROGRESS NOTES
HPI     Patient here for 7 day follow-up     Patient states OD is feeling better but OS is starting to irritate   patient. Patient last used Vigamox last night. No pain, irritation OS.   Patient states vision is stable. No other ocular complaints at this time.       Last edited by Kalina Marrero MA on 7/20/2022  8:14 AM. (History)            Assessment /Plan     For exam results, see Encounter Report.    Squamous blepharitis of upper and lower eyelids of both eyes  Patient states symptoms of FBS improved after concretion removal one week prior, can stop vigamox for OD today, continue warm compress with lid hygiene. Continue restasis BID and preservative free tear lubrication 4-6 times daily     Epiretinal membrane (ERM) of both eyes  Continue per Dr Tran.     Type 2 diabetes mellitus with chronic kidney disease, with long-term current use of insulin, unspecified CKD stage  Continue per Dr Tran.     RTC with Dr Tran as scheduled, sooner if any changes to vision or worsening symptoms.

## 2022-08-04 ENCOUNTER — OFFICE VISIT (OUTPATIENT)
Dept: CARDIOLOGY | Facility: CLINIC | Age: 77
End: 2022-08-04
Payer: MEDICARE

## 2022-08-04 ENCOUNTER — SPECIALTY PHARMACY (OUTPATIENT)
Dept: PHARMACY | Facility: CLINIC | Age: 77
End: 2022-08-04
Payer: MEDICARE

## 2022-08-04 VITALS
HEART RATE: 65 BPM | SYSTOLIC BLOOD PRESSURE: 120 MMHG | WEIGHT: 244.94 LBS | HEIGHT: 68 IN | OXYGEN SATURATION: 99 % | BODY MASS INDEX: 37.12 KG/M2 | DIASTOLIC BLOOD PRESSURE: 78 MMHG

## 2022-08-04 DIAGNOSIS — G47.33 OSA ON CPAP: Chronic | ICD-10-CM

## 2022-08-04 DIAGNOSIS — I73.9 PVD (PERIPHERAL VASCULAR DISEASE): ICD-10-CM

## 2022-08-04 DIAGNOSIS — I49.3 PVC (PREMATURE VENTRICULAR CONTRACTION): Primary | ICD-10-CM

## 2022-08-04 DIAGNOSIS — Z78.9 STATIN INTOLERANCE: Chronic | ICD-10-CM

## 2022-08-04 DIAGNOSIS — E78.5 HYPERLIPIDEMIA, UNSPECIFIED HYPERLIPIDEMIA TYPE: ICD-10-CM

## 2022-08-04 DIAGNOSIS — I70.0 AORTIC ATHEROSCLEROSIS: ICD-10-CM

## 2022-08-04 DIAGNOSIS — I48.0 PAROXYSMAL ATRIAL FIBRILLATION: ICD-10-CM

## 2022-08-04 DIAGNOSIS — I10 ESSENTIAL HYPERTENSION: Chronic | ICD-10-CM

## 2022-08-04 DIAGNOSIS — I25.10 CORONARY ARTERY DISEASE INVOLVING NATIVE CORONARY ARTERY OF NATIVE HEART WITHOUT ANGINA PECTORIS: Chronic | ICD-10-CM

## 2022-08-04 DIAGNOSIS — Z95.1 S/P CABG X 2: ICD-10-CM

## 2022-08-04 PROCEDURE — 1157F ADVNC CARE PLAN IN RCRD: CPT | Mod: CPTII,S$GLB,, | Performed by: INTERNAL MEDICINE

## 2022-08-04 PROCEDURE — 3288F FALL RISK ASSESSMENT DOCD: CPT | Mod: CPTII,S$GLB,, | Performed by: INTERNAL MEDICINE

## 2022-08-04 PROCEDURE — 1101F PR PT FALLS ASSESS DOC 0-1 FALLS W/OUT INJ PAST YR: ICD-10-PCS | Mod: CPTII,S$GLB,, | Performed by: INTERNAL MEDICINE

## 2022-08-04 PROCEDURE — 3074F PR MOST RECENT SYSTOLIC BLOOD PRESSURE < 130 MM HG: ICD-10-PCS | Mod: CPTII,S$GLB,, | Performed by: INTERNAL MEDICINE

## 2022-08-04 PROCEDURE — 1157F PR ADVANCE CARE PLAN OR EQUIV PRESENT IN MEDICAL RECORD: ICD-10-PCS | Mod: CPTII,S$GLB,, | Performed by: INTERNAL MEDICINE

## 2022-08-04 PROCEDURE — 99999 PR PBB SHADOW E&M-EST. PATIENT-LVL V: ICD-10-PCS | Mod: PBBFAC,,, | Performed by: INTERNAL MEDICINE

## 2022-08-04 PROCEDURE — 99999 PR PBB SHADOW E&M-EST. PATIENT-LVL V: CPT | Mod: PBBFAC,,, | Performed by: INTERNAL MEDICINE

## 2022-08-04 PROCEDURE — 3074F SYST BP LT 130 MM HG: CPT | Mod: CPTII,S$GLB,, | Performed by: INTERNAL MEDICINE

## 2022-08-04 PROCEDURE — 3072F PR LOW RISK FOR RETINOPATHY: ICD-10-PCS | Mod: CPTII,S$GLB,, | Performed by: INTERNAL MEDICINE

## 2022-08-04 PROCEDURE — 1159F PR MEDICATION LIST DOCUMENTED IN MEDICAL RECORD: ICD-10-PCS | Mod: CPTII,S$GLB,, | Performed by: INTERNAL MEDICINE

## 2022-08-04 PROCEDURE — 1159F MED LIST DOCD IN RCRD: CPT | Mod: CPTII,S$GLB,, | Performed by: INTERNAL MEDICINE

## 2022-08-04 PROCEDURE — 3078F DIAST BP <80 MM HG: CPT | Mod: CPTII,S$GLB,, | Performed by: INTERNAL MEDICINE

## 2022-08-04 PROCEDURE — 99214 PR OFFICE/OUTPT VISIT, EST, LEVL IV, 30-39 MIN: ICD-10-PCS | Mod: S$GLB,,, | Performed by: INTERNAL MEDICINE

## 2022-08-04 PROCEDURE — 1126F AMNT PAIN NOTED NONE PRSNT: CPT | Mod: CPTII,S$GLB,, | Performed by: INTERNAL MEDICINE

## 2022-08-04 PROCEDURE — 1101F PT FALLS ASSESS-DOCD LE1/YR: CPT | Mod: CPTII,S$GLB,, | Performed by: INTERNAL MEDICINE

## 2022-08-04 PROCEDURE — 3072F LOW RISK FOR RETINOPATHY: CPT | Mod: CPTII,S$GLB,, | Performed by: INTERNAL MEDICINE

## 2022-08-04 PROCEDURE — 1126F PR PAIN SEVERITY QUANTIFIED, NO PAIN PRESENT: ICD-10-PCS | Mod: CPTII,S$GLB,, | Performed by: INTERNAL MEDICINE

## 2022-08-04 PROCEDURE — 99214 OFFICE O/P EST MOD 30 MIN: CPT | Mod: S$GLB,,, | Performed by: INTERNAL MEDICINE

## 2022-08-04 PROCEDURE — 3288F PR FALLS RISK ASSESSMENT DOCUMENTED: ICD-10-PCS | Mod: CPTII,S$GLB,, | Performed by: INTERNAL MEDICINE

## 2022-08-04 PROCEDURE — 3078F PR MOST RECENT DIASTOLIC BLOOD PRESSURE < 80 MM HG: ICD-10-PCS | Mod: CPTII,S$GLB,, | Performed by: INTERNAL MEDICINE

## 2022-08-04 NOTE — TELEPHONE ENCOUNTER
Patient stated he would like to have his prescription filled at [facility] once it is covered.     Chava, this is Zunilda Simons with [facility].  We are working on your prescription that your doctor has sent us. We will be working with your insurance to get this approved for you. We will be calling you along the way with updates on your medication.  If you have any questions, you can reach us at [phone].    Welcome call outcome: Patient/caregiver reached

## 2022-08-04 NOTE — PROGRESS NOTES
Subjective:   Patient ID:  Erendira Pretty is a 77 y.o. male who presents for follow-up of No chief complaint on file.  Coreg decreased-fatigue and low BP  Zetia stopped also helped with fatigue.  Feels better , no myalgia. Patient denies CP, angina or anginal equivalent.  Exercise JACKY normal  Hypertension  This is a chronic problem. The current episode started more than 1 year ago. The problem has been gradually improving since onset. The problem is controlled. Pertinent negatives include no chest pain, palpitations or shortness of breath. Past treatments include ACE inhibitors, beta blockers and calcium channel blockers. The current treatment provides moderate improvement. There are no compliance problems.  Hypertensive end-organ damage includes CAD/MI.   Coronary Artery Disease  Presents for follow-up visit. Pertinent negatives include no chest pain, chest pressure, chest tightness, dizziness, leg swelling, muscle weakness, palpitations, shortness of breath or weight gain. The symptoms have been stable. Compliance with diet is variable. Compliance with exercise is variable. Compliance with medications is good.       Review of Systems   Constitutional: Negative. Negative for weight gain.   HENT: Negative.    Eyes: Negative.    Cardiovascular: Negative.  Negative for chest pain, leg swelling and palpitations.   Respiratory: Negative.  Negative for chest tightness and shortness of breath.    Endocrine: Negative.    Hematologic/Lymphatic: Negative.    Skin: Negative.    Musculoskeletal: Negative for muscle weakness.   Gastrointestinal: Negative.    Genitourinary: Negative.    Neurological: Negative.  Negative for dizziness.   Psychiatric/Behavioral: Negative.    Allergic/Immunologic: Negative.      Family History   Problem Relation Age of Onset    Heart disease Mother     Heart disease Father     Heart disease Brother     Colon cancer Neg Hx      Past Medical History:   Diagnosis Date    Aortic stenosis      Asthma     Benign prostatic hyperplasia with nocturia     Bilateral carotid artery stenosis 2021    US CAROTID BILATERAL 2021 IMPRESSION: Atherosclerosis with suspected stenosis greater than 50% at the right proximal ICA visually. Velocities are discordant and appear improved from prior. Atherosclerosis on the left with no evidence of flow-limiting stenosis greater than 50%.  FINDINGS: Right: Internal Carotid Artery (ICA): Peak systolic velocity 118 cm/sec. End diastolic velocity 35 cm/sec    BPH (benign prostatic hyperplasia)     CAD (coronary artery disease)     40% lesion ;lazo    Cirrhosis     Claudication 2014    Colon polyp     COPD (chronic obstructive pulmonary disease)     ED (erectile dysfunction)     Encounter for blood transfusion     Ex-smoker     Hearing loss NEC     Hepatitis C     Cured;reji brown 2015    Hyperlipidemia     Hypertension     Hypothyroid     s/p tx graves    Open angle with borderline findings and low glaucoma risk in both eyes 2020    DELONTE on CPAP     Osteoarthritis     PVD (peripheral vascular disease)     Secondary esophageal varices without bleeding 2017    Type 2 diabetes mellitus      Social History     Socioeconomic History    Marital status:      Spouse name: YAEL    Number of children: 2   Tobacco Use    Smoking status: Former Smoker     Packs/day: 0.50     Years: 4.00     Pack years: 2.00     Quit date: 1972     Years since quittin.1    Smokeless tobacco: Former User     Types: Chew     Quit date: 1976   Substance and Sexual Activity    Alcohol use: Yes     Alcohol/week: 2.0 standard drinks     Types: 2 Cans of beer per week    Drug use: No    Sexual activity: Yes     Partners: Female   Social History Narrative    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     Current Outpatient  Medications on File Prior to Visit   Medication Sig Dispense Refill    ACCU-CHEK GRACE PLUS METER Misc AS DIRECTED 1 each 0    albuterol (VENTOLIN HFA) 90 mcg/actuation inhaler Inhale 2 puffs into the lungs every 6 (six) hours as needed for Wheezing or Shortness of Breath. Rescue 18 g 3    amLODIPine (NORVASC) 10 MG tablet TAKE 1 TABLET BY MOUTH ONCE A DAY (DOSE INCREASE) 30 tablet 11    aspirin (ECOTRIN) 81 MG EC tablet Take 1 tablet (81 mg total) by mouth once daily. 90 tablet 4    azelastine (ASTELIN) 137 mcg (0.1 %) nasal spray 2 sprays (274 mcg total) by Nasal route 2 (two) times daily. 30 mL 6    blood sugar diagnostic (ACCU-CHEK GRACE PLUS TEST STRP) Strp TEST THREE TIMES A  strip 11    budesonide-formoterol 160-4.5 mcg (SYMBICORT) 160-4.5 mcg/actuation HFAA INHALE 2 PUFFS INTO THE LUNGS TWO TIMES A DAY. 10.2 g 11    carvediloL (COREG) 6.25 MG tablet TAKE 1 TABLET BY MOUTH TWICE DAILY WITH MEALS 60 tablet 5    empagliflozin (JARDIANCE) 25 mg tablet Take 1 tablet (25 mg total) by mouth once daily. 90 tablet 3    finasteride (PROSCAR) 5 mg tablet TAKE 1 TABLET BY MOUTH EVERY DAY 90 tablet 0    fluticasone propionate (FLONASE) 50 mcg/actuation nasal spray 2 sprays (100 mcg total) by Each Nostril route once daily. 18.2 mL 6    furosemide (LASIX) 40 MG tablet Take 1 tablet (40 mg total) by mouth 2 (two) times daily. 60 tablet 11    GLUCOSAMINE HCL/CHONDR ROSARIO A NA (OSTEO BI-FLEX ORAL) Take 1 tablet by mouth 2 (two) times daily.       krill oil 500 mg Cap Take by mouth.      lancets (ACCU-CHEK FASTCLIX LANCET DRUM) Misc TEST TWO TIMES A  each 5    LANTUS SOLOSTAR U-100 INSULIN glargine 100 units/mL (3mL) SubQ pen INJECT 44 UNITS UNDER THE SKIN EVERY EVENING 15 mL 6    levocetirizine (XYZAL) 5 MG tablet TAKE 1 TABLET BY MOUTH EVERY EVENING 30 tablet 11    levothyroxine (SYNTHROID) 125 MCG tablet Take 1 tablet (125 mcg total) by mouth before breakfast. 90 tablet 3    lisinopriL 10 MG  "tablet TAKE 1 BY MOUTH EVERY DAY 30 tablet 7    metFORMIN (GLUCOPHAGE) 500 MG tablet TAKE 1 TABLET BY MOUTH TWICE DAILY WITH MEALS 180 tablet 3    montelukast (SINGULAIR) 10 mg tablet Take 1 tablet (10 mg total) by mouth once daily. 30 tablet 11    pen needle, diabetic (BD ULTRA-FINE MINI PEN NEEDLE) 31 gauge x 3/16" Ndle USE AS DIRECTED 100 each 11    RABEprazole (ACIPHEX) 20 mg tablet Take 1 tablet (20 mg total) by mouth 2 (two) times daily. 60 tablet 11    sildenafiL (VIAGRA) 100 MG tablet Take 1/2 to 1 tablet by mouth one hour prior to intercourse. Max 100mg per day (Patient taking differently: Take 1/2 to 1 tablet by mouth one hour prior to intercourse. Max 100mg per day) 30 tablet 1     Current Facility-Administered Medications on File Prior to Visit   Medication Dose Route Frequency Provider Last Rate Last Admin    lactated ringers infusion   Intravenous Continuous Omaira Theodore MD   New Bag at 09/09/19 1117     Review of patient's allergies indicates:   Allergen Reactions    Lipitor [atorvastatin] Other (See Comments)     Muscle aches and pains as well as fatigue.     Niacin preparations Other (See Comments)     Causes broken blood vessels and bruises at 4 times normal dose.    Statins-hmg-coa reductase inhibitors Other (See Comments)     Statins cause intolerable myalgias.    Iodinated contrast media Hives     Tachycardia    Omeprazole Hives and Itching    Prilosec [omeprazole magnesium] Hives and Itching       Objective:     Physical Exam  Vitals and nursing note reviewed.   Constitutional:       Appearance: He is well-developed.   HENT:      Head: Normocephalic and atraumatic.   Eyes:      Conjunctiva/sclera: Conjunctivae normal.      Pupils: Pupils are equal, round, and reactive to light.   Cardiovascular:      Rate and Rhythm: Normal rate and regular rhythm.      Pulses: Intact distal pulses.      Heart sounds: Normal heart sounds.   Pulmonary:      Effort: Pulmonary effort is " normal.      Breath sounds: Normal breath sounds.   Abdominal:      General: Bowel sounds are normal.      Palpations: Abdomen is soft.   Musculoskeletal:      Cervical back: Normal range of motion and neck supple.   Skin:     General: Skin is warm and dry.   Neurological:      Mental Status: He is alert and oriented to person, place, and time.         Assessment:     1. PVC (premature ventricular contraction)    2. PVD (peripheral vascular disease)    3. S/P CABG x 2    4. Aortic atherosclerosis    5. Paroxysmal atrial fibrillation    6. Essential hypertension    7. Coronary artery disease involving native coronary artery of native heart without angina pectoris    8. DELONTE on CPAP    9. Statin intolerance        Plan:     PVC (premature ventricular contraction)    PVD (peripheral vascular disease)    S/P CABG x 2    Aortic atherosclerosis    Paroxysmal atrial fibrillation    Essential hypertension    Coronary artery disease involving native coronary artery of native heart without angina pectoris    DELONTE on CPAP    Statin intolerance      continue coreg 1/2 tab bid- HTN  Continue lisinopril , norvasc - HTN  Start repatha

## 2022-08-04 NOTE — TELEPHONE ENCOUNTER
Repatha PA approved through 2/4/23.   [id number]  $35 Copay     Outgoing call to patient confirming he wants it routed to [facility]. Closing out.

## 2022-08-16 ENCOUNTER — TELEPHONE (OUTPATIENT)
Dept: OTOLARYNGOLOGY | Facility: CLINIC | Age: 77
End: 2022-08-16
Payer: MEDICARE

## 2022-08-16 DIAGNOSIS — J32.4 CHRONIC PANSINUSITIS: ICD-10-CM

## 2022-08-16 DIAGNOSIS — K21.9 GASTROESOPHAGEAL REFLUX DISEASE WITHOUT ESOPHAGITIS: ICD-10-CM

## 2022-08-16 RX ORDER — LEVOCETIRIZINE DIHYDROCHLORIDE 5 MG/1
5 TABLET, FILM COATED ORAL NIGHTLY
Qty: 30 TABLET | Refills: 11 | Status: SHIPPED | OUTPATIENT
Start: 2022-08-16 | End: 2022-11-29 | Stop reason: SDUPTHER

## 2022-08-16 NOTE — TELEPHONE ENCOUNTER
----- Message from Leda Heart sent at 8/16/2022  8:53 AM CDT -----  Contact: [phone]  Type:  RX Refill Request    Who Called: pt  Refill or New Rx:refill  RX Name and Strength:RABEprazole (ACIPHEX) 20 mg tablet  How is the patient currently taking it? (ex. 1XDay):2xday  Is this a 30 day or 90 day RX:30  Preferred Pharmacy with phone number:  [place], [place] - [address] [facility] [address]  [address] [address] [address]  Phone: [phone] Fax: [phone]      Local or Mail Order:local  Ordering Provider:leora  Would the patient rather a call back or a response via [facility]? call  Best Call Back Number:[phone]  Additional Information:

## 2022-08-16 NOTE — TELEPHONE ENCOUNTER
No new care gaps identified.  [facility] Embedded Care Gaps. Reference number: [id number]. 8/16/2022   9:00:28 AM CDT

## 2022-08-16 NOTE — TELEPHONE ENCOUNTER
----- Message from Cuca Costa MA sent at 8/16/2022  8:54 AM CDT -----  Regarding: FW: refill  Contact: pt    ----- Message -----  From: Lanie Rogers  Sent: 8/16/2022   8:48 AM CDT  To: Iggy Allen Staff  Subject: refill                                           What is the name of the medication you are requesting? Levocetirizine  What is the dose? 5mg  How do you take the medication? Orally, Topically, etc?  How often do you take this medication?  Do you need a 30 day or 90 day supply?  How many refills are you requesting?  What is your preferred pharmacy and location of pharmacy? [facility] pharmacy  Who can we contact with further questions? Pt at [phone]

## 2022-08-17 RX ORDER — RABEPRAZOLE SODIUM 20 MG/1
20 TABLET, DELAYED RELEASE ORAL 2 TIMES DAILY
Qty: 60 TABLET | Refills: 11 | Status: SHIPPED | OUTPATIENT
Start: 2022-08-17 | End: 2023-10-09 | Stop reason: SDUPTHER

## 2022-08-29 ENCOUNTER — HOSPITAL ENCOUNTER (OUTPATIENT)
Dept: RADIOLOGY | Facility: HOSPITAL | Age: 77
Discharge: HOME | End: 2022-08-29
Attending: INTERNAL MEDICINE
Payer: MEDICARE

## 2022-08-29 ENCOUNTER — OFFICE VISIT (OUTPATIENT)
Dept: HEPATOLOGY | Facility: CLINIC | Age: 77
End: 2022-08-29
Payer: MEDICARE

## 2022-08-29 VITALS
HEIGHT: 68 IN | DIASTOLIC BLOOD PRESSURE: 80 MMHG | BODY MASS INDEX: 36.23 KG/M2 | SYSTOLIC BLOOD PRESSURE: 124 MMHG | HEART RATE: 73 BPM | WEIGHT: 239.06 LBS

## 2022-08-29 DIAGNOSIS — K74.69 OTHER CIRRHOSIS OF LIVER: ICD-10-CM

## 2022-08-29 DIAGNOSIS — C22.0 HCC (HEPATOCELLULAR CARCINOMA): ICD-10-CM

## 2022-08-29 DIAGNOSIS — C22.0 HCC (HEPATOCELLULAR CARCINOMA): Primary | ICD-10-CM

## 2022-08-29 PROCEDURE — 1157F PR ADVANCE CARE PLAN OR EQUIV PRESENT IN MEDICAL RECORD: ICD-10-PCS | Mod: CPTII,S$GLB,, | Performed by: INTERNAL MEDICINE

## 2022-08-29 PROCEDURE — 1159F MED LIST DOCD IN RCRD: CPT | Mod: CPTII,S$GLB,, | Performed by: INTERNAL MEDICINE

## 2022-08-29 PROCEDURE — 1157F ADVNC CARE PLAN IN RCRD: CPT | Mod: CPTII,S$GLB,, | Performed by: INTERNAL MEDICINE

## 2022-08-29 PROCEDURE — 1160F PR REVIEW ALL MEDS BY PRESCRIBER/CLIN PHARMACIST DOCUMENTED: ICD-10-PCS | Mod: CPTII,S$GLB,, | Performed by: INTERNAL MEDICINE

## 2022-08-29 PROCEDURE — 99999 PR PBB SHADOW E&M-EST. PATIENT-LVL V: ICD-10-PCS | Mod: PBBFAC,,, | Performed by: INTERNAL MEDICINE

## 2022-08-29 PROCEDURE — 74183 MRI ABD W/O CNTR FLWD CNTR: CPT | Mod: 26,,, | Performed by: RADIOLOGY

## 2022-08-29 PROCEDURE — 99213 OFFICE O/P EST LOW 20 MIN: CPT | Mod: S$GLB,,, | Performed by: INTERNAL MEDICINE

## 2022-08-29 PROCEDURE — 99213 PR OFFICE/OUTPT VISIT, EST, LEVL III, 20-29 MIN: ICD-10-PCS | Mod: S$GLB,,, | Performed by: INTERNAL MEDICINE

## 2022-08-29 PROCEDURE — 1159F PR MEDICATION LIST DOCUMENTED IN MEDICAL RECORD: ICD-10-PCS | Mod: CPTII,S$GLB,, | Performed by: INTERNAL MEDICINE

## 2022-08-29 PROCEDURE — 99999 PR PBB SHADOW E&M-EST. PATIENT-LVL V: CPT | Mod: PBBFAC,,, | Performed by: INTERNAL MEDICINE

## 2022-08-29 PROCEDURE — 3079F DIAST BP 80-89 MM HG: CPT | Mod: CPTII,S$GLB,, | Performed by: INTERNAL MEDICINE

## 2022-08-29 PROCEDURE — 1160F RVW MEDS BY RX/DR IN RCRD: CPT | Mod: CPTII,S$GLB,, | Performed by: INTERNAL MEDICINE

## 2022-08-29 PROCEDURE — 3288F PR FALLS RISK ASSESSMENT DOCUMENTED: ICD-10-PCS | Mod: CPTII,S$GLB,, | Performed by: INTERNAL MEDICINE

## 2022-08-29 PROCEDURE — 25500020 PHARM REV CODE 255: Performed by: INTERNAL MEDICINE

## 2022-08-29 PROCEDURE — A9585 GADOBUTROL INJECTION: HCPCS | Performed by: INTERNAL MEDICINE

## 2022-08-29 PROCEDURE — 3074F PR MOST RECENT SYSTOLIC BLOOD PRESSURE < 130 MM HG: ICD-10-PCS | Mod: CPTII,S$GLB,, | Performed by: INTERNAL MEDICINE

## 2022-08-29 PROCEDURE — 1126F AMNT PAIN NOTED NONE PRSNT: CPT | Mod: CPTII,S$GLB,, | Performed by: INTERNAL MEDICINE

## 2022-08-29 PROCEDURE — 74183 MRI ABDOMEN W WO CONTRAST: ICD-10-PCS | Mod: 26,,, | Performed by: RADIOLOGY

## 2022-08-29 PROCEDURE — 3072F PR LOW RISK FOR RETINOPATHY: ICD-10-PCS | Mod: CPTII,S$GLB,, | Performed by: INTERNAL MEDICINE

## 2022-08-29 PROCEDURE — 3288F FALL RISK ASSESSMENT DOCD: CPT | Mod: CPTII,S$GLB,, | Performed by: INTERNAL MEDICINE

## 2022-08-29 PROCEDURE — 1126F PR PAIN SEVERITY QUANTIFIED, NO PAIN PRESENT: ICD-10-PCS | Mod: CPTII,S$GLB,, | Performed by: INTERNAL MEDICINE

## 2022-08-29 PROCEDURE — 1101F PR PT FALLS ASSESS DOC 0-1 FALLS W/OUT INJ PAST YR: ICD-10-PCS | Mod: CPTII,S$GLB,, | Performed by: INTERNAL MEDICINE

## 2022-08-29 PROCEDURE — 3079F PR MOST RECENT DIASTOLIC BLOOD PRESSURE 80-89 MM HG: ICD-10-PCS | Mod: CPTII,S$GLB,, | Performed by: INTERNAL MEDICINE

## 2022-08-29 PROCEDURE — 3072F LOW RISK FOR RETINOPATHY: CPT | Mod: CPTII,S$GLB,, | Performed by: INTERNAL MEDICINE

## 2022-08-29 PROCEDURE — 3074F SYST BP LT 130 MM HG: CPT | Mod: CPTII,S$GLB,, | Performed by: INTERNAL MEDICINE

## 2022-08-29 PROCEDURE — 74183 MRI ABD W/O CNTR FLWD CNTR: CPT | Mod: TC

## 2022-08-29 PROCEDURE — 1101F PT FALLS ASSESS-DOCD LE1/YR: CPT | Mod: CPTII,S$GLB,, | Performed by: INTERNAL MEDICINE

## 2022-08-29 RX ORDER — GADOBUTROL 604.72 MG/ML
10 INJECTION INTRAVENOUS
Status: COMPLETED | OUTPATIENT
Start: 2022-08-29 | End: 2022-08-29

## 2022-08-29 RX ADMIN — GADOBUTROL 10 ML: 604.72 INJECTION INTRAVENOUS at 11:08

## 2022-08-29 NOTE — PROGRESS NOTES
Subjective:     Erendira Pretty is here for follow up of cirrhosis    HPI  Since Erendira Pretty's last visit there have been MSK issues.  Overall feels well.    No evidence of liver decompensation: no ascites, confusion or GI bleeding.    HCC s/p ablation 9/2019, 1/2022    Review of Systems    Objective:     Physical Exam  Vitals reviewed.   Constitutional:       General: He is not in acute distress.     Appearance: He is well-developed.   HENT:      Head: Normocephalic and atraumatic.      Mouth/Throat:      Pharynx: No oropharyngeal exudate.   Eyes:      General: No scleral icterus.        Right eye: No discharge.         Left eye: No discharge.      Conjunctiva/sclera: Conjunctivae normal.      Pupils: Pupils are equal, round, and reactive to light.   Pulmonary:      Effort: Pulmonary effort is normal. No respiratory distress.      Breath sounds: Normal breath sounds. No wheezing.   Abdominal:      General: There is no distension.      Palpations: Abdomen is soft.      Tenderness: There is no abdominal tenderness.   Musculoskeletal:      Right lower leg: Edema (trace) present.      Left lower leg: Edema (trace) present.   Neurological:      Mental Status: He is alert and oriented to person, place, and time.   Psychiatric:         Behavior: Behavior normal.       MELD-Na score: 8 at 8/29/2022  9:31 AM  MELD score: 8 at 8/29/2022  9:31 AM  Calculated from:  Serum Creatinine: 1.2 mg/dL at 8/29/2022  9:31 AM  Serum Sodium: 140 mmol/L (Using max of 137 mmol/L) at 8/29/2022  9:31 AM  Total Bilirubin: 0.8 mg/dL (Using min of 1 mg/dL) at 8/29/2022  9:31 AM  INR(ratio): 1.0 at 8/29/2022  9:31 AM  Age: 77 years    WBC   Date Value Ref Range Status   08/29/2022 5.22 3.90 - 12.70 K/uL Final     Hemoglobin   Date Value Ref Range Status   08/29/2022 15.1 14.0 - 18.0 g/dL Final     POC Hematocrit   Date Value Ref Range Status   11/05/2018 26 (L) 36 - 54 %PCV Final     Hematocrit   Date Value Ref Range Status   08/29/2022 43.3  40.0 - 54.0 % Final     Platelets   Date Value Ref Range Status   08/29/2022 161 150 - 450 K/uL Final     BUN   Date Value Ref Range Status   08/29/2022 13 8 - 23 mg/dL Final     Creatinine   Date Value Ref Range Status   08/29/2022 1.2 0.5 - 1.4 mg/dL Final     Glucose   Date Value Ref Range Status   08/29/2022 146 (H) 70 - 110 mg/dL Final     Calcium   Date Value Ref Range Status   08/29/2022 9.5 8.7 - 10.5 mg/dL Final     Sodium   Date Value Ref Range Status   08/29/2022 140 136 - 145 mmol/L Final     Potassium   Date Value Ref Range Status   08/29/2022 4.0 3.5 - 5.1 mmol/L Final     Chloride   Date Value Ref Range Status   08/29/2022 100 95 - 110 mmol/L Final     Magnesium   Date Value Ref Range Status   11/08/2018 2.1 1.6 - 2.6 mg/dL Final     AST   Date Value Ref Range Status   08/29/2022 28 10 - 40 U/L Final     ALT   Date Value Ref Range Status   08/29/2022 27 10 - 44 U/L Final     Alkaline Phosphatase   Date Value Ref Range Status   08/29/2022 80 55 - 135 U/L Final     Total Bilirubin   Date Value Ref Range Status   08/29/2022 0.8 0.1 - 1.0 mg/dL Final     Comment:     For infants and newborns, interpretation of results should be based  on gestational age, weight and in agreement with clinical  observations.    Premature Infant recommended reference ranges:  Up to 24 hours.............<8.0 mg/dL  Up to 48 hours............<12.0 mg/dL  3-5 days..................<15.0 mg/dL  6-29 days.................<15.0 mg/dL       Albumin   Date Value Ref Range Status   08/29/2022 4.0 3.5 - 5.2 g/dL Final     INR   Date Value Ref Range Status   08/29/2022 1.0 0.8 - 1.2 Final     Comment:     Coumadin Therapy:  2.0 - 3.0 for INR for all indicators except mechanical heart valves  and antiphospholipid syndromes which should use 2.5 - 3.5.           Assessment/Plan:     1. HCC (hepatocellular carcinoma)    2. Other cirrhosis of liver      Erendira Pretty is a 77 y.o. male withHepatocellular Carcinoma, Follow-up, and  Cirrhosis    HCC (hepatocellular carcinoma)-awaiting MRI result from today  -     CT Chest Without Contrast; Future; Expected date: 08/29/2022  -     Comprehensive Metabolic Panel; Future; Expected date: 08/29/2022  -     AFP Tumor Marker; Future; Expected date: 08/29/2022  -     MRI Abdomen W WO Contrast; Future; Expected date: 08/29/2022    Other cirrhosis of liver- low MELD, well compensated  -Continue to monitor MELD labs  -Continue variceal screening-on beta blocker  -     Comprehensive Metabolic Panel; Future; Expected date: 08/29/2022  -     CBC Auto Differential; Future; Expected date: 08/29/2022  -     AFP Tumor Marker; Future; Expected date: 08/29/2022  -     Protime-INR; Future; Expected date: 08/29/2022      RTC in 6 months with preclinic labs and imaging    Anna Tomas MD

## 2022-08-30 ENCOUNTER — PATIENT MESSAGE (OUTPATIENT)
Dept: HEPATOLOGY | Facility: CLINIC | Age: 77
End: 2022-08-30
Payer: MEDICARE

## 2022-08-30 DIAGNOSIS — E03.9 ACQUIRED HYPOTHYROIDISM: Chronic | ICD-10-CM

## 2022-08-30 RX ORDER — LEVOTHYROXINE SODIUM 137 UG/1
137 TABLET ORAL
Qty: 90 TABLET | Refills: 3 | Status: SHIPPED | OUTPATIENT
Start: 2022-08-30 | End: 2022-11-07

## 2022-09-01 ENCOUNTER — PATIENT MESSAGE (OUTPATIENT)
Dept: HEPATOLOGY | Facility: CLINIC | Age: 77
End: 2022-09-01
Payer: MEDICARE

## 2022-09-06 ENCOUNTER — OFFICE VISIT (OUTPATIENT)
Dept: OPHTHALMOLOGY | Facility: CLINIC | Age: 77
End: 2022-09-06
Payer: MEDICARE

## 2022-09-06 DIAGNOSIS — Z79.4 TYPE 2 DIABETES MELLITUS WITH CHRONIC KIDNEY DISEASE, WITH LONG-TERM CURRENT USE OF INSULIN, UNSPECIFIED CKD STAGE: ICD-10-CM

## 2022-09-06 DIAGNOSIS — H43.812 PVD (POSTERIOR VITREOUS DETACHMENT), LEFT EYE: ICD-10-CM

## 2022-09-06 DIAGNOSIS — Z96.1 PSEUDOPHAKIA: ICD-10-CM

## 2022-09-06 DIAGNOSIS — H35.373 EPIRETINAL MEMBRANE (ERM) OF BOTH EYES: Primary | ICD-10-CM

## 2022-09-06 DIAGNOSIS — H11.129: ICD-10-CM

## 2022-09-06 DIAGNOSIS — E11.22 TYPE 2 DIABETES MELLITUS WITH CHRONIC KIDNEY DISEASE, WITH LONG-TERM CURRENT USE OF INSULIN, UNSPECIFIED CKD STAGE: ICD-10-CM

## 2022-09-06 PROCEDURE — 1160F RVW MEDS BY RX/DR IN RCRD: CPT | Mod: CPTII,S$GLB,, | Performed by: OPHTHALMOLOGY

## 2022-09-06 PROCEDURE — 1159F MED LIST DOCD IN RCRD: CPT | Mod: CPTII,S$GLB,, | Performed by: OPHTHALMOLOGY

## 2022-09-06 PROCEDURE — 99212 OFFICE O/P EST SF 10 MIN: CPT | Mod: S$GLB,,, | Performed by: OPHTHALMOLOGY

## 2022-09-06 PROCEDURE — 2023F DILAT RTA XM W/O RTNOPTHY: CPT | Mod: CPTII,S$GLB,, | Performed by: OPHTHALMOLOGY

## 2022-09-06 PROCEDURE — 92134 CPTRZ OPH DX IMG PST SGM RTA: CPT | Mod: S$GLB,,, | Performed by: OPHTHALMOLOGY

## 2022-09-06 PROCEDURE — 99999 PR PBB SHADOW E&M-EST. PATIENT-LVL III: CPT | Mod: PBBFAC,,, | Performed by: OPHTHALMOLOGY

## 2022-09-06 PROCEDURE — 1126F PR PAIN SEVERITY QUANTIFIED, NO PAIN PRESENT: ICD-10-PCS | Mod: CPTII,S$GLB,, | Performed by: OPHTHALMOLOGY

## 2022-09-06 PROCEDURE — 2023F PR DILATED RETINAL EXAM W/O EVID OF RETINOPATHY: ICD-10-PCS | Mod: CPTII,S$GLB,, | Performed by: OPHTHALMOLOGY

## 2022-09-06 PROCEDURE — 1159F PR MEDICATION LIST DOCUMENTED IN MEDICAL RECORD: ICD-10-PCS | Mod: CPTII,S$GLB,, | Performed by: OPHTHALMOLOGY

## 2022-09-06 PROCEDURE — 99212 PR OFFICE/OUTPT VISIT, EST, LEVL II, 10-19 MIN: ICD-10-PCS | Mod: S$GLB,,, | Performed by: OPHTHALMOLOGY

## 2022-09-06 PROCEDURE — 92134 POSTERIOR SEGMENT OCT RETINA (OCULAR COHERENCE TOMOGRAPHY)-BOTH EYES: ICD-10-PCS | Mod: S$GLB,,, | Performed by: OPHTHALMOLOGY

## 2022-09-06 PROCEDURE — 1157F ADVNC CARE PLAN IN RCRD: CPT | Mod: CPTII,S$GLB,, | Performed by: OPHTHALMOLOGY

## 2022-09-06 PROCEDURE — 1160F PR REVIEW ALL MEDS BY PRESCRIBER/CLIN PHARMACIST DOCUMENTED: ICD-10-PCS | Mod: CPTII,S$GLB,, | Performed by: OPHTHALMOLOGY

## 2022-09-06 PROCEDURE — 1157F PR ADVANCE CARE PLAN OR EQUIV PRESENT IN MEDICAL RECORD: ICD-10-PCS | Mod: CPTII,S$GLB,, | Performed by: OPHTHALMOLOGY

## 2022-09-06 PROCEDURE — 1126F AMNT PAIN NOTED NONE PRSNT: CPT | Mod: CPTII,S$GLB,, | Performed by: OPHTHALMOLOGY

## 2022-09-06 PROCEDURE — 99999 PR PBB SHADOW E&M-EST. PATIENT-LVL III: ICD-10-PCS | Mod: PBBFAC,,, | Performed by: OPHTHALMOLOGY

## 2022-09-06 RX ORDER — NEOMYCIN SULFATE, POLYMYXIN B SULFATE, AND DEXAMETHASONE 3.5; 10000; 1 MG/G; [USP'U]/G; MG/G
OINTMENT OPHTHALMIC 2 TIMES DAILY
Qty: 3.5 G | Refills: 0 | Status: SHIPPED | OUTPATIENT
Start: 2022-09-06 | End: 2023-12-05

## 2022-09-06 NOTE — PROGRESS NOTES
===============================  Date today is 9/6/2022  Erendira Pretty is a 77 y.o. male  Last visit [facility]: :4/4/2022   Last visit eye dept. 7/13/2022    Uncorrected distance visual acuity was 20/25 in the right eye and 20/40 in the left eye.  Tonometry       Tonometry (Applanation, 7:45 AM)         Right Left    Pressure 18 18                  Not recorded       Not recorded       Not recorded       Chief Complaint   Patient presents with    Diabetic Eye Exam     Yearly diabetic exam     HPI     Diabetic Eye Exam     Additional comments: Yearly diabetic exam           Comments    + DIET CONTROLLED DM  MINI ERM OU  DRY EYES  ON RESTASIS  OU LL PLUGS  YAG OS3/21/2022          Last edited by Madeline Cash on 9/6/2022  7:36 AM.      Problem List Items Addressed This Visit          Eye/Vision problems    Type 2 diabetes mellitus with kidney complication, with long-term current use of insulin (Chronic)    Epiretinal membrane - Both Eyes - Primary    Relevant Orders    Posterior Segment OCT Retina-Both eyes (Completed)    Pseudophakia     Other Visit Diagnoses       PVD (posterior vitreous detachment), left eye        Conjunctival concretion of eyelid        Relevant Medications    neomycin-polymyxin-dexamethasone (DEXACINE) 3.5 mg/g-10,000 unit/g-0.1 % Oint          Instructed to call 24/7 for any worsening of vision, visual distortion or pain.  Check OU independently daily.    Gave my office and personal cell phone number.  ________________  9/6/2022 today  Erendira Pretty    ERM OU  OCT looks great  DM no retinopathy  PCIOL OU post YAG  Post calcium secretion removal- OD upper lid feels irritated  PVD OS  Minimal SPK OS  Recommend maxitrol yajaira BID prn, if not better with yajaira, may need another calcium removal    RTC 1 year  Instructed to call 24/7 for any worsening of vision or symptoms. Check OU daily.   Gave my office and cell phone number.      =============================

## 2022-09-07 NOTE — SUBJECTIVE & OBJECTIVE
Review of Systems   Constitution: Negative.   HENT: Negative.    Eyes: Negative.    Cardiovascular: Negative.    Respiratory: Negative.    Endocrine: Negative.    Hematologic/Lymphatic: Negative.    Skin: Negative.    Musculoskeletal: Negative.    Gastrointestinal: Negative.    Genitourinary: Negative.    Neurological: Negative.    Psychiatric/Behavioral: Negative.    Allergic/Immunologic: Negative.      Objective:     Vital Signs (Most Recent):  Temp: 97.5 °F (36.4 °C) (03/03/20 0739)  Pulse: 71 (03/03/20 1016)  Resp: 20 (03/03/20 0739)  BP: (!) 179/83 (03/03/20 1016)  SpO2: 97 % (03/03/20 0739) Vital Signs (24h Range):  Temp:  [96.7 °F (35.9 °C)-98.6 °F (37 °C)] 97.5 °F (36.4 °C)  Pulse:  [63-95] 71  Resp:  [16-20] 20  SpO2:  [94 %-98 %] 97 %  BP: (105-186)/(56-99) 179/83     Weight: 112.2 kg (247 lb 5.7 oz)  Body mass index is 37.61 kg/m².     SpO2: 97 %  O2 Device (Oxygen Therapy): room air      Intake/Output Summary (Last 24 hours) at 3/3/2020 1312  Last data filed at 3/3/2020 0725  Gross per 24 hour   Intake 1951.67 ml   Output --   Net 1951.67 ml       Lines/Drains/Airways     Peripheral Intravenous Line                 Peripheral IV - Single Lumen 03/01/20 1841 20 G Right Antecubital 1 day         Peripheral IV - Single Lumen 03/02/20 0939 20 G Right Forearm 1 day                Physical Exam   Constitutional: He is oriented to person, place, and time. He appears well-developed and well-nourished. No distress.   HENT:   Head: Normocephalic and atraumatic.   Eyes: Pupils are equal, round, and reactive to light. Right eye exhibits no discharge. Left eye exhibits no discharge.   Neck: Neck supple. No JVD present.   Cardiovascular: Normal rate, regular rhythm, S1 normal, S2 normal and normal heart sounds.   No murmur heard.  Pulmonary/Chest: Effort normal and breath sounds normal. No respiratory distress. He has no wheezes. He has no rales.   CABG site well-healed   Abdominal: Soft. He exhibits no  Vaccine Information Statement(s) or the Emergency Use Authorization was given today. This has been reviewed, questions answered, and verbal consent given by Patient for injection(s) and administration of Shingles.      Patient tolerated without incident. See immunization grid for documentation.         distension.   Musculoskeletal: He exhibits no edema.   Neurological: He is alert and oriented to person, place, and time.   Skin: Skin is warm and dry. He is not diaphoretic. No erythema.   Left radial access site C/D/I; no bleeding erythema or drainage   Psychiatric: He has a normal mood and affect. His behavior is normal.   Nursing note and vitals reviewed.      Significant Labs:   CMP   Recent Labs   Lab 03/01/20  1841 03/02/20  0437 03/03/20  0624    141 138   K 3.7 3.9 4.1    103 103   CO2 28 28 22*   * 139* 215*   BUN 15 14 16   CREATININE 1.2 0.9 1.2   CALCIUM 9.3 8.8 9.0   PROT 7.8  --   --    ALBUMIN 3.9  --   --    BILITOT 0.4  --   --    ALKPHOS 70  --   --    AST 26  --   --    ALT 32  --   --    ANIONGAP 10 10 13   ESTGFRAFRICA >60 >60 >60   EGFRNONAA 60 >60 60   , CBC   Recent Labs   Lab 03/01/20  1841 03/02/20  0916 03/03/20  0624   WBC 6.36 5.41 10.94  10.94   HGB 14.5 13.7* 14.5  14.5   HCT 41.5 40.3 41.6  41.6   * 136* 172  172   , Troponin   Recent Labs   Lab 03/01/20  1841 03/02/20  0121 03/02/20  0704   TROPONINI 0.018 0.013 0.014    and All pertinent lab results from the last 24 hours have been reviewed.    Significant Imaging: Echocardiogram:   2D echo with color flow doppler:   Results for orders placed or performed in visit on 11/14/18   2D echo with color flow doppler   Result Value Ref Range    QEF 60 55 - 65    Est. PA Systolic Pressure 32.72     Tricuspid Valve Regurgitation MILD     Narrative    Date of Procedure: 12/13/2018        TEST DESCRIPTION   Technical Quality: This is a technically challenging study. This study was performed in conjunction with a 3ml intravenous injection of Optison contrast agent.     Aorta: The aortic root is normal in size, measuring 2.7 cm at sinotubular junction and 3.3 cm at Sinuses of Valsalva. The proximal ascending aorta is normal in size, measuring 3.4 cm across.     Left Atrium: The left atrial volume index is normal,  measuring 31.79 cc/m2.     Left Ventricle: The left ventricle is normal in size, with an end-diastolic diameter of 4.5 cm, and an end-systolic diameter of 3.4 cm. LV wall thickness is normal, with the septum measuring 1.7 cm and the posterior wall measuring 1.3 cm across. Relative   wall thickness was increased at 0.58, and the LV mass index was increased at 147.5 g/m2 consistent with concentric left ventricular hypertrophy. There are no regional wall motion abnormalities. Left ventricular systolic function appears normal. Visually   estimated ejection fraction is 60-65%. The LV Doppler derived stroke volume equals 72.0 ccs.     Diastolic indices: E wave velocity 0.6 m/s, E/A ratio 1.0,  msec., E/e' ratio(avg) 8. Diastolic function is indeterminate.     Right Atrium: The right atrium is normal in size, measuring 5.2 cm in length and 3.5 cm in width in the apical view.     Right Ventricle: The right ventricle is normal in size measuring 2.7 cm at the base in the apical right ventricle-focused view. Global right ventricular systolic function appears normal. The estimated PA systolic pressure is greater than 33 mmHg.     Aortic Valve:  The aortic valve is moderately sclerotic. The peak gradient obtained across the aortic valve is 8 mmHg.     Mitral Valve:  The mitral valve is mildly sclerotic.     Tricuspid Valve:  The tricuspid valve is normal in structure. There is mild tricuspid regurgitation.     Pulmonary Valve:  The pulmonic valve is not well seen.     IVC: The IVC is not visualized.     Intracavitary: There is no evidence of pericardial effusion, intracavity mass, thrombi, or vegetation.         CONCLUSIONS     1 - Concentric hypertrophy.     2 - No wall motion abnormalities.     3 - Normal left ventricular systolic function (EF 60-65%).     4 - Indeterminate LV diastolic function.     5 - Normal right ventricular systolic function .     6 - The estimated PA systolic pressure is greater than 33 mmHg.     7  - Mild tricuspid regurgitation.   Poor imaging quality.          This document has been electronically    SIGNED BY: Thomas Israel MD On: 12/13/2018 18:32   , EKG: Reviewed and X-Ray: CXR: X-Ray Chest 1 View (CXR): No results found for this visit on 03/01/20. and X-Ray Chest PA and Lateral (CXR): No results found for this visit on 03/01/20.

## 2022-10-26 ENCOUNTER — LAB VISIT (OUTPATIENT)
Dept: LAB | Facility: HOSPITAL | Age: 77
End: 2022-10-26
Attending: PEDIATRICS
Payer: MEDICARE

## 2022-10-26 DIAGNOSIS — E03.9 ACQUIRED HYPOTHYROIDISM: Chronic | ICD-10-CM

## 2022-10-26 LAB
T4 FREE SERPL-MCNC: 0.83 NG/DL (ref 0.71–1.51)
TSH SERPL DL<=0.005 MIU/L-ACNC: 12.11 UIU/ML (ref 0.4–4)

## 2022-10-26 PROCEDURE — 36415 COLL VENOUS BLD VENIPUNCTURE: CPT | Mod: PO | Performed by: PEDIATRICS

## 2022-10-26 PROCEDURE — 84443 ASSAY THYROID STIM HORMONE: CPT | Performed by: PEDIATRICS

## 2022-10-26 PROCEDURE — 84439 ASSAY OF FREE THYROXINE: CPT | Performed by: PEDIATRICS

## 2022-11-02 DIAGNOSIS — R35.1 BENIGN PROSTATIC HYPERPLASIA WITH NOCTURIA: ICD-10-CM

## 2022-11-02 DIAGNOSIS — N40.1 BENIGN PROSTATIC HYPERPLASIA WITH NOCTURIA: ICD-10-CM

## 2022-11-02 RX ORDER — FINASTERIDE 5 MG/1
5 TABLET, FILM COATED ORAL DAILY
Qty: 90 TABLET | Refills: 3 | Status: SHIPPED | OUTPATIENT
Start: 2022-11-02 | End: 2023-08-08

## 2022-11-02 NOTE — TELEPHONE ENCOUNTER
Care Due:                  Date            Visit Type   Department     Provider  --------------------------------------------------------------------------------                                EP -                              PRIMARY      [facility] INTERNAL  Last Visit: 06-      [facility] ([facility]   MEDICINE       Bhupinder Callaway  Next Visit: None Scheduled  None         None Found                                                            Last  Test          Frequency    Reason                     Performed    Due Date  --------------------------------------------------------------------------------    HBA1C.......  6 months...  LANTUS, empagliflozin,     06- 12-                             metFORMIN................    [employer] Embedded Care Gaps. Reference number: [id number]. 11/02/2022   8:17:39 AM CDT

## 2022-11-07 ENCOUNTER — OFFICE VISIT (OUTPATIENT)
Dept: GASTROENTEROLOGY | Facility: CLINIC | Age: 77
End: 2022-11-07
Payer: MEDICARE

## 2022-11-07 ENCOUNTER — OFFICE VISIT (OUTPATIENT)
Dept: INTERNAL MEDICINE | Facility: CLINIC | Age: 77
End: 2022-11-07
Payer: MEDICARE

## 2022-11-07 VITALS
SYSTOLIC BLOOD PRESSURE: 118 MMHG | DIASTOLIC BLOOD PRESSURE: 74 MMHG | BODY MASS INDEX: 35.42 KG/M2 | OXYGEN SATURATION: 98 % | HEIGHT: 68 IN | TEMPERATURE: 98 F | HEART RATE: 81 BPM | WEIGHT: 233.69 LBS

## 2022-11-07 VITALS
BODY MASS INDEX: 36.38 KG/M2 | HEIGHT: 68 IN | WEIGHT: 240.06 LBS | SYSTOLIC BLOOD PRESSURE: 111 MMHG | HEART RATE: 70 BPM | DIASTOLIC BLOOD PRESSURE: 70 MMHG

## 2022-11-07 DIAGNOSIS — K74.60 CIRRHOSIS OF LIVER WITHOUT ASCITES, UNSPECIFIED HEPATIC CIRRHOSIS TYPE: Primary | ICD-10-CM

## 2022-11-07 DIAGNOSIS — K21.9 GASTROESOPHAGEAL REFLUX DISEASE WITHOUT ESOPHAGITIS: ICD-10-CM

## 2022-11-07 DIAGNOSIS — R10.13 EPIGASTRIC PAIN: ICD-10-CM

## 2022-11-07 DIAGNOSIS — E03.9 ACQUIRED HYPOTHYROIDISM: Primary | Chronic | ICD-10-CM

## 2022-11-07 DIAGNOSIS — C22.0 HEPATOCELLULAR CARCINOMA: ICD-10-CM

## 2022-11-07 PROCEDURE — 99999 PR PBB SHADOW E&M-EST. PATIENT-LVL III: ICD-10-PCS | Mod: PBBFAC,,, | Performed by: NURSE PRACTITIONER

## 2022-11-07 PROCEDURE — 99999 PR PBB SHADOW E&M-EST. PATIENT-LVL V: ICD-10-PCS | Mod: PBBFAC,,, | Performed by: NURSE PRACTITIONER

## 2022-11-07 PROCEDURE — 1157F PR ADVANCE CARE PLAN OR EQUIV PRESENT IN MEDICAL RECORD: ICD-10-PCS | Mod: CPTII,S$GLB,, | Performed by: NURSE PRACTITIONER

## 2022-11-07 PROCEDURE — 99213 OFFICE O/P EST LOW 20 MIN: CPT | Mod: S$GLB,,, | Performed by: NURSE PRACTITIONER

## 2022-11-07 PROCEDURE — 3072F PR LOW RISK FOR RETINOPATHY: ICD-10-PCS | Mod: CPTII,S$GLB,, | Performed by: NURSE PRACTITIONER

## 2022-11-07 PROCEDURE — 3078F PR MOST RECENT DIASTOLIC BLOOD PRESSURE < 80 MM HG: ICD-10-PCS | Mod: CPTII,S$GLB,, | Performed by: NURSE PRACTITIONER

## 2022-11-07 PROCEDURE — 99213 PR OFFICE/OUTPT VISIT, EST, LEVL III, 20-29 MIN: ICD-10-PCS | Mod: S$GLB,,, | Performed by: NURSE PRACTITIONER

## 2022-11-07 PROCEDURE — 99214 OFFICE O/P EST MOD 30 MIN: CPT | Mod: S$GLB,,, | Performed by: NURSE PRACTITIONER

## 2022-11-07 PROCEDURE — 1126F AMNT PAIN NOTED NONE PRSNT: CPT | Mod: CPTII,S$GLB,, | Performed by: NURSE PRACTITIONER

## 2022-11-07 PROCEDURE — 3074F SYST BP LT 130 MM HG: CPT | Mod: CPTII,S$GLB,, | Performed by: NURSE PRACTITIONER

## 2022-11-07 PROCEDURE — 1159F PR MEDICATION LIST DOCUMENTED IN MEDICAL RECORD: ICD-10-PCS | Mod: CPTII,S$GLB,, | Performed by: NURSE PRACTITIONER

## 2022-11-07 PROCEDURE — 3288F PR FALLS RISK ASSESSMENT DOCUMENTED: ICD-10-PCS | Mod: CPTII,S$GLB,, | Performed by: NURSE PRACTITIONER

## 2022-11-07 PROCEDURE — 1159F MED LIST DOCD IN RCRD: CPT | Mod: CPTII,S$GLB,, | Performed by: NURSE PRACTITIONER

## 2022-11-07 PROCEDURE — 3072F LOW RISK FOR RETINOPATHY: CPT | Mod: CPTII,S$GLB,, | Performed by: NURSE PRACTITIONER

## 2022-11-07 PROCEDURE — 3078F DIAST BP <80 MM HG: CPT | Mod: CPTII,S$GLB,, | Performed by: NURSE PRACTITIONER

## 2022-11-07 PROCEDURE — 3074F PR MOST RECENT SYSTOLIC BLOOD PRESSURE < 130 MM HG: ICD-10-PCS | Mod: CPTII,S$GLB,, | Performed by: NURSE PRACTITIONER

## 2022-11-07 PROCEDURE — 1126F PR PAIN SEVERITY QUANTIFIED, NO PAIN PRESENT: ICD-10-PCS | Mod: CPTII,S$GLB,, | Performed by: NURSE PRACTITIONER

## 2022-11-07 PROCEDURE — 1160F RVW MEDS BY RX/DR IN RCRD: CPT | Mod: CPTII,S$GLB,, | Performed by: NURSE PRACTITIONER

## 2022-11-07 PROCEDURE — 99999 PR PBB SHADOW E&M-EST. PATIENT-LVL III: CPT | Mod: PBBFAC,,, | Performed by: NURSE PRACTITIONER

## 2022-11-07 PROCEDURE — 1101F PR PT FALLS ASSESS DOC 0-1 FALLS W/OUT INJ PAST YR: ICD-10-PCS | Mod: CPTII,S$GLB,, | Performed by: NURSE PRACTITIONER

## 2022-11-07 PROCEDURE — 1160F PR REVIEW ALL MEDS BY PRESCRIBER/CLIN PHARMACIST DOCUMENTED: ICD-10-PCS | Mod: CPTII,S$GLB,, | Performed by: NURSE PRACTITIONER

## 2022-11-07 PROCEDURE — 1157F ADVNC CARE PLAN IN RCRD: CPT | Mod: CPTII,S$GLB,, | Performed by: NURSE PRACTITIONER

## 2022-11-07 PROCEDURE — 99999 PR PBB SHADOW E&M-EST. PATIENT-LVL V: CPT | Mod: PBBFAC,,, | Performed by: NURSE PRACTITIONER

## 2022-11-07 PROCEDURE — 3288F FALL RISK ASSESSMENT DOCD: CPT | Mod: CPTII,S$GLB,, | Performed by: NURSE PRACTITIONER

## 2022-11-07 PROCEDURE — 1101F PT FALLS ASSESS-DOCD LE1/YR: CPT | Mod: CPTII,S$GLB,, | Performed by: NURSE PRACTITIONER

## 2022-11-07 PROCEDURE — 99214 PR OFFICE/OUTPT VISIT, EST, LEVL IV, 30-39 MIN: ICD-10-PCS | Mod: S$GLB,,, | Performed by: NURSE PRACTITIONER

## 2022-11-07 RX ORDER — LEVOTHYROXINE SODIUM 150 UG/1
150 TABLET ORAL
Qty: 30 TABLET | Refills: 11 | Status: SHIPPED | OUTPATIENT
Start: 2022-11-07 | End: 2023-01-11

## 2022-11-07 RX ORDER — FAMOTIDINE 40 MG/1
40 TABLET, FILM COATED ORAL NIGHTLY
Qty: 30 TABLET | Refills: 11 | Status: SHIPPED | OUTPATIENT
Start: 2022-11-07 | End: 2023-10-16

## 2022-11-07 NOTE — PROGRESS NOTES
"Clinic Consult:  [facility] Consultation Note    Reason for Consult:  The primary encounter diagnosis was Cirrhosis of liver without ascites, unspecified hepatic cirrhosis type. Diagnoses of Gastroesophageal reflux disease without esophagitis, Epigastric pain, and Hepatocellular carcinoma were also pertinent to this visit.    PCP: FRANCIE Callaway Jr   [address] [facility] / [place] [id number]    HPI:  This is a 77 y.o. male here for evaluation of GERD.   Onset: chronic and has been worsening over the last few weeks.   Symptoms: regurgitation, burning chest pain , and "acid" taste  Nausea or vomiting: no  Dysphagia: no  Melena or hematochezia: no  NSAID use: no  Smoker: no  Family history of UGI cancer: no  Prior workup: EGD 2019- chronic gastritis.   Treatments tried: Aciphex 20 mg- daily. Started taking it twice a day 1 week ago. Prilosec- SE, Nexium- insurance didn't cover.     He has liver cirrhosis and HCC s/p ablation. He is followed by Dr. Tomas with Hepatology.     Review of Systems   Constitutional:  Negative for fever, malaise/fatigue and weight loss.   HENT:  Negative for sore throat.    Respiratory:  Negative for cough and wheezing.    Cardiovascular:  Negative for chest pain and palpitations.   Gastrointestinal:  Positive for heartburn. Negative for abdominal pain, blood in stool, constipation, diarrhea, melena, nausea and vomiting.   Genitourinary:  Negative for dysuria and frequency.   Musculoskeletal:  Negative for back pain, joint pain, myalgias and neck pain.   Skin:  Negative for itching and rash.   Neurological:  Negative for dizziness, speech change, seizures, loss of consciousness and headaches.   Psychiatric/Behavioral:  Negative for depression and substance abuse. The patient is not nervous/anxious.      Medical History:  has a past medical history of Aortic stenosis, Asthma, Benign prostatic hyperplasia with nocturia, Bilateral carotid artery stenosis (7/21/2021), BPH (benign " prostatic hyperplasia), CAD (coronary artery disease), Cirrhosis, Claudication (4/9/2014), Colon polyp, COPD (chronic obstructive pulmonary disease), ED (erectile dysfunction), Encounter for blood transfusion, Ex-smoker, Hearing loss NEC, Hepatitis C, Hyperlipidemia, Hypertension, Hypothyroid, Open angle with borderline findings and low glaucoma risk in both eyes (9/22/2020), DELONTE on CPAP, Osteoarthritis, PVD (peripheral vascular disease), Secondary esophageal varices without bleeding (6/26/2017), and Type 2 diabetes mellitus.    Surgical History:  has a past surgical history that includes Knee surgery (Right); Trigger finger release (Left); Carpal tunnel release (Left); Elbow bursa surgery (Right, 2010); Rectal surgery (2012); Eye surgery; Cataract extraction w/ intraocular lens  implant, bilateral (2008); Cardiac catheterization; Colonoscopy (8/2013); Colonoscopy (N/A, 5/30/2016); Transurethral resection of prostate (TURP) without use of laser (N/A, 6/19/2018); Coronary Artery Bypass Graft (CABG) (N/A, 11/5/2018); Endoscopic harvest of vein (Left, 11/5/2018); Cardiac surgery; Catheterization of both left and right heart (N/A, 11/2/2018); Functional endoscopic sinus surgery (FESS) (Bilateral, 3/27/2019); Frontal sinus obliteration (Bilateral, 3/27/2019); Maxillary antrostomy (Bilateral, 3/27/2019); Ethmoidectomy (Bilateral, 3/27/2019); Nasal septoplasty (N/A, 3/27/2019); Knee arthroscopy w/ meniscal repair (Left, 06/2019); Esophagogastroduodenoscopy (N/A, 7/17/2019); Colonoscopy (N/A, 7/17/2019); Computed tomography (N/A, 9/9/2019); Cataract extraction; Left heart catheterization (Left, 3/2/2020); Coronary bypass graft angiography (3/2/2020); Foot surgery; Computed tomography (N/A, 1/25/2022); and Liver biopsy (01/2022).    Family History: family history includes Heart disease in his brother, father, and mother..     Social History:  reports that he quit smoking about 50 years ago. He has a 2.00 pack-year smoking  history. He quit smokeless tobacco use about 46 years ago.  His smokeless tobacco use included chew. He reports current alcohol use of about 2.0 standard drinks per week. He reports that he does not use drugs.    Allergies: Reviewed    Home Medications:   Current Outpatient Medications on File Prior to Visit   Medication Sig Dispense Refill    ACCU-CHEK GRACE PLUS METER Misc AS DIRECTED 1 each 0    albuterol (VENTOLIN HFA) 90 mcg/actuation inhaler Inhale 2 puffs into the lungs every 6 (six) hours as needed for Wheezing or Shortness of Breath. Rescue 18 g 3    amLODIPine (NORVASC) 10 MG tablet TAKE 1 TABLET BY MOUTH ONCE A DAY (DOSE INCREASE) 30 tablet 11    aspirin (ECOTRIN) 81 MG EC tablet TAKE 1 TABLET BY MOUTH EVERY DAY 90 tablet 4    azelastine (ASTELIN) 137 mcg (0.1 %) nasal spray 2 sprays (274 mcg total) by Nasal route 2 (two) times daily. 30 mL 6    blood sugar diagnostic (ACCU-CHEK GRACE PLUS TEST STRP) Strp TEST THREE TIMES A  strip 11    budesonide-formoterol 160-4.5 mcg (SYMBICORT) 160-4.5 mcg/actuation HFAA INHALE 2 PUFFS INTO THE LUNGS TWO TIMES A DAY. 10.2 g 11    carvediloL (COREG) 6.25 MG tablet TAKE 1 TABLET BY MOUTH TWICE DAILY WITH MEALS 60 tablet 5    empagliflozin (JARDIANCE) 25 mg tablet Take 1 tablet (25 mg total) by mouth once daily. 90 tablet 3    evolocumab (REPATHA SYRINGE) 140 mg/mL Syrg Inject 140 mg into the skin every 14 (fourteen) days. 2 each 11    finasteride (PROSCAR) 5 mg tablet Take 1 tablet (5 mg total) by mouth once daily. 90 tablet 3    fluticasone propionate (FLONASE) 50 mcg/actuation nasal spray 2 sprays (100 mcg total) by Each Nostril route once daily. 18.2 mL 6    furosemide (LASIX) 40 MG tablet Take 1 tablet (40 mg total) by mouth 2 (two) times daily. 60 tablet 11    GLUCOSAMINE HCL/CHONDR ROSARIO A NA (OSTEO BI-FLEX ORAL) Take 1 tablet by mouth 2 (two) times daily.       krill oil 500 mg Cap Take by mouth.      lancets (ACCU-CHEK FASTCLIX LANCET DRUM) Misc TEST TWO  "TIMES A  each 5    LANTUS SOLOSTAR U-100 INSULIN glargine 100 units/mL (3mL) SubQ pen INJECT 44 UNITS UNDER THE SKIN EVERY EVENING 15 mL 6    levocetirizine (XYZAL) 5 MG tablet Take 1 tablet (5 mg total) by mouth every evening. 30 tablet 11    levothyroxine (SYNTHROID) 150 MCG tablet Take 1 tablet (150 mcg total) by mouth before breakfast. 30 tablet 11    lisinopriL 10 MG tablet TAKE 1 BY MOUTH EVERY DAY 30 tablet 7    metFORMIN (GLUCOPHAGE) 500 MG tablet TAKE 1 TABLET BY MOUTH TWICE DAILY WITH MEALS 180 tablet 3    montelukast (SINGULAIR) 10 mg tablet Take 1 tablet (10 mg total) by mouth once daily. 30 tablet 11    neomycin-polymyxin-dexamethasone (DEXACINE) 3.5 mg/g-10,000 unit/g-0.1 % Oint Place into both eyes 2 (two) times a day. Apply to lid margins 3.5 g 0    pen needle, diabetic (BD ULTRA-FINE MINI PEN NEEDLE) 31 gauge x 3/16" Ndle USE AS DIRECTED 100 each 11    RABEprazole (ACIPHEX) 20 mg tablet Take 1 tablet (20 mg total) by mouth 2 (two) times daily. 60 tablet 11    sildenafiL (VIAGRA) 100 MG tablet Take 1/2 to 1 tablet by mouth one hour prior to intercourse. Max 100mg per day 30 tablet 1    [DISCONTINUED] levothyroxine (SYNTHROID) 137 MCG Tab tablet Take 1 tablet (137 mcg total) by mouth before breakfast. 90 tablet 3     Current Facility-Administered Medications on File Prior to Visit   Medication Dose Route Frequency Provider Last Rate Last Admin    lactated ringers infusion   Intravenous Continuous Omaira Theodore MD   New Bag at 09/09/19 1117       Physical Exam:  /70   Pulse 70   Ht 5' 8" (1.727 m)   Wt 108.9 kg (240 lb 1.3 oz)   BMI 36.50 kg/m²   Body mass index is 36.5 kg/m².  Physical Exam  Constitutional:       General: He is not in acute distress.  HENT:      Head: Normocephalic and atraumatic.   Eyes:      General: No scleral icterus.     Conjunctiva/sclera: Conjunctivae normal.   Cardiovascular:      Rate and Rhythm: Normal rate and regular rhythm.      Heart sounds: No " murmur heard.  Pulmonary:      Effort: Pulmonary effort is normal. No respiratory distress.      Breath sounds: Normal breath sounds. No wheezing.   Abdominal:      General: Abdomen is flat. Bowel sounds are normal.      Palpations: Abdomen is soft.      Tenderness: There is no abdominal tenderness.   Skin:     General: Skin is warm and dry.   Neurological:      General: No focal deficit present.      Mental Status: He is alert and oriented to person, place, and time.      Cranial Nerves: No cranial nerve deficit.   Psychiatric:         Mood and Affect: Mood normal.         Judgment: Judgment normal.       Labs: Pertinent labs reviewed.  CRC Screening: up to date     Assessment:  1. Cirrhosis of liver without ascites, unspecified hepatic cirrhosis type    2. Gastroesophageal reflux disease without esophagitis    3. Epigastric pain    4. Hepatocellular carcinoma        Recommendations:   - continue Aciphex  - add pepcid 40 mg at night  - needs EGD for further evaluation. Also due for EV screening.   - cirrhosis management per hepatology.     Cirrhosis of liver without ascites, unspecified hepatic cirrhosis type  -     Ambulatory referral/consult to Gastroenterology  -     famotidine (PEPCID) 40 MG tablet; Take 1 tablet (40 mg total) by mouth every evening.  Dispense: 30 tablet; Refill: 11  -     Case Request Endoscopy: ESOPHAGOGASTRODUODENOSCOPY (EGD)  -     Ambulatory referral/consult to Endo Procedure ; Future; Expected date: 11/08/2022    Gastroesophageal reflux disease without esophagitis  -     Ambulatory referral/consult to Gastroenterology  -     Case Request Endoscopy: ESOPHAGOGASTRODUODENOSCOPY (EGD)  -     Ambulatory referral/consult to Endo Procedure ; Future; Expected date: 11/08/2022    Epigastric pain  -     Ambulatory referral/consult to Gastroenterology  -     Case Request Endoscopy: ESOPHAGOGASTRODUODENOSCOPY (EGD)  -     Ambulatory referral/consult to Endo Procedure ;  Future; Expected date: 11/08/2022    Hepatocellular carcinoma    Follow up to be determined after results/ procedure(s).    Thank you so much for allowing me to participate in the care of LIDA Dunlap

## 2022-11-07 NOTE — PROGRESS NOTES
Subjective:       Patient ID: Erendira Pretty is a 77 y.o. male.    Chief Complaint: follow up     HPI    Hypothyroid- recent dose change to synthroid 137 mcg, still not stable. Feels sluggish.  Reports epigastric pain/worsening heart burn s/s. Using PPI. Admits to drinking beer daily. I informed him that this could be the culprit, but he wishes to see GI      Review of Systems   Constitutional:  Negative for activity change, appetite change, chills, diaphoresis, fatigue, fever and unexpected weight change.   HENT:  Negative for congestion, ear pain, postnasal drip, rhinorrhea, sinus pressure, sinus pain, sneezing, sore throat, tinnitus, trouble swallowing and voice change.    Eyes:  Negative for photophobia, pain and visual disturbance.   Respiratory:  Negative for cough, chest tightness, shortness of breath and wheezing.    Cardiovascular:  Negative for chest pain, palpitations and leg swelling.   Gastrointestinal:  Positive for abdominal pain. Negative for abdominal distention, constipation, diarrhea, nausea and vomiting.        See HPI   Genitourinary:  Negative for decreased urine volume, difficulty urinating, dysuria, flank pain, frequency, hematuria and urgency.   Musculoskeletal:  Negative for arthralgias, back pain, joint swelling, neck pain and neck stiffness.   Allergic/Immunologic: Negative for immunocompromised state.   Neurological:  Negative for dizziness, tremors, seizures, syncope, facial asymmetry, speech difficulty, weakness, light-headedness, numbness and headaches.   Hematological:  Negative for adenopathy. Does not bruise/bleed easily.   Psychiatric/Behavioral:  Negative for confusion and sleep disturbance.      Objective:      Physical Exam  Vitals reviewed.   Cardiovascular:      Rate and Rhythm: Normal rate and regular rhythm.      Heart sounds: Normal heart sounds.   Pulmonary:      Effort: Pulmonary effort is normal.      Breath sounds: Normal breath sounds.   Neurological:      Mental  Status: He is alert and oriented to person, place, and time.   Psychiatric:         Mood and Affect: Mood normal.       Assessment:     Vitals:    11/07/22 1050   BP: 118/74   Pulse: 81   Temp: 98.3 °F (36.8 °C)         1. Acquired hypothyroidism    2. Gastroesophageal reflux disease without esophagitis    3. Epigastric pain        Plan:   Acquired hypothyroidism  -     TSH; Future; Expected date: 05/06/2023  -     T4; Future; Expected date: 05/07/2023    Gastroesophageal reflux disease without esophagitis  -     Ambulatory referral/consult to Gastroenterology; Future; Expected date: 11/14/2022    Epigastric pain  -     Ambulatory referral/consult to Gastroenterology; Future; Expected date: 11/14/2022    Other orders  -     levothyroxine (SYNTHROID) 150 MCG tablet; Take 1 tablet (150 mcg total) by mouth before breakfast.  Dispense: 30 tablet; Refill: 11    Decreased acidic foods/and ETOH, see GI  Increase synthroid to 150 mcg - recheck with next labs in [place]

## 2022-11-09 ENCOUNTER — HOSPITAL ENCOUNTER (OUTPATIENT)
Dept: PREADMISSION TESTING | Facility: HOSPITAL | Age: 77
Discharge: HOME | End: 2022-11-09
Payer: MEDICARE

## 2022-11-09 DIAGNOSIS — K21.9 GASTROESOPHAGEAL REFLUX DISEASE WITHOUT ESOPHAGITIS: ICD-10-CM

## 2022-11-09 DIAGNOSIS — K74.60 CIRRHOSIS OF LIVER WITHOUT ASCITES, UNSPECIFIED HEPATIC CIRRHOSIS TYPE: ICD-10-CM

## 2022-11-09 DIAGNOSIS — R10.13 EPIGASTRIC PAIN: ICD-10-CM

## 2022-11-29 DIAGNOSIS — J32.4 CHRONIC PANSINUSITIS: ICD-10-CM

## 2022-11-30 RX ORDER — LEVOCETIRIZINE DIHYDROCHLORIDE 5 MG/1
5 TABLET, FILM COATED ORAL NIGHTLY
Qty: 30 TABLET | Refills: 11 | Status: SHIPPED | OUTPATIENT
Start: 2022-11-30

## 2022-12-14 ENCOUNTER — OFFICE VISIT (OUTPATIENT)
Dept: OTOLARYNGOLOGY | Facility: CLINIC | Age: 77
End: 2022-12-14
Payer: MEDICARE

## 2022-12-14 VITALS
HEIGHT: 68 IN | SYSTOLIC BLOOD PRESSURE: 122 MMHG | TEMPERATURE: 98 F | BODY MASS INDEX: 36.02 KG/M2 | HEART RATE: 78 BPM | WEIGHT: 237.63 LBS | DIASTOLIC BLOOD PRESSURE: 77 MMHG

## 2022-12-14 DIAGNOSIS — H61.23 HEARING LOSS DUE TO CERUMEN IMPACTION, BILATERAL: Primary | ICD-10-CM

## 2022-12-14 PROCEDURE — 1125F AMNT PAIN NOTED PAIN PRSNT: CPT | Mod: CPTII,S$GLB,, | Performed by: OTOLARYNGOLOGY

## 2022-12-14 PROCEDURE — 1157F ADVNC CARE PLAN IN RCRD: CPT | Mod: CPTII,S$GLB,, | Performed by: OTOLARYNGOLOGY

## 2022-12-14 PROCEDURE — 99499 UNLISTED E&M SERVICE: CPT | Mod: S$GLB,,, | Performed by: OTOLARYNGOLOGY

## 2022-12-14 PROCEDURE — 3078F DIAST BP <80 MM HG: CPT | Mod: CPTII,S$GLB,, | Performed by: OTOLARYNGOLOGY

## 2022-12-14 PROCEDURE — 1101F PR PT FALLS ASSESS DOC 0-1 FALLS W/OUT INJ PAST YR: ICD-10-PCS | Mod: CPTII,S$GLB,, | Performed by: OTOLARYNGOLOGY

## 2022-12-14 PROCEDURE — 1125F PR PAIN SEVERITY QUANTIFIED, PAIN PRESENT: ICD-10-PCS | Mod: CPTII,S$GLB,, | Performed by: OTOLARYNGOLOGY

## 2022-12-14 PROCEDURE — 69210 REMOVE IMPACTED EAR WAX UNI: CPT | Mod: S$GLB,,, | Performed by: OTOLARYNGOLOGY

## 2022-12-14 PROCEDURE — 99999 PR PBB SHADOW E&M-EST. PATIENT-LVL III: ICD-10-PCS | Mod: PBBFAC,,, | Performed by: OTOLARYNGOLOGY

## 2022-12-14 PROCEDURE — 1159F PR MEDICATION LIST DOCUMENTED IN MEDICAL RECORD: ICD-10-PCS | Mod: CPTII,S$GLB,, | Performed by: OTOLARYNGOLOGY

## 2022-12-14 PROCEDURE — 1159F MED LIST DOCD IN RCRD: CPT | Mod: CPTII,S$GLB,, | Performed by: OTOLARYNGOLOGY

## 2022-12-14 PROCEDURE — 1157F PR ADVANCE CARE PLAN OR EQUIV PRESENT IN MEDICAL RECORD: ICD-10-PCS | Mod: CPTII,S$GLB,, | Performed by: OTOLARYNGOLOGY

## 2022-12-14 PROCEDURE — 3074F SYST BP LT 130 MM HG: CPT | Mod: CPTII,S$GLB,, | Performed by: OTOLARYNGOLOGY

## 2022-12-14 PROCEDURE — 3288F PR FALLS RISK ASSESSMENT DOCUMENTED: ICD-10-PCS | Mod: CPTII,S$GLB,, | Performed by: OTOLARYNGOLOGY

## 2022-12-14 PROCEDURE — 3078F PR MOST RECENT DIASTOLIC BLOOD PRESSURE < 80 MM HG: ICD-10-PCS | Mod: CPTII,S$GLB,, | Performed by: OTOLARYNGOLOGY

## 2022-12-14 PROCEDURE — 3072F LOW RISK FOR RETINOPATHY: CPT | Mod: CPTII,S$GLB,, | Performed by: OTOLARYNGOLOGY

## 2022-12-14 PROCEDURE — 3074F PR MOST RECENT SYSTOLIC BLOOD PRESSURE < 130 MM HG: ICD-10-PCS | Mod: CPTII,S$GLB,, | Performed by: OTOLARYNGOLOGY

## 2022-12-14 PROCEDURE — 3072F PR LOW RISK FOR RETINOPATHY: ICD-10-PCS | Mod: CPTII,S$GLB,, | Performed by: OTOLARYNGOLOGY

## 2022-12-14 PROCEDURE — 99499 NO LOS: ICD-10-PCS | Mod: S$GLB,,, | Performed by: OTOLARYNGOLOGY

## 2022-12-14 PROCEDURE — 99999 PR PBB SHADOW E&M-EST. PATIENT-LVL III: CPT | Mod: PBBFAC,,, | Performed by: OTOLARYNGOLOGY

## 2022-12-14 PROCEDURE — 69210 PR REMOVAL IMPACTED CERUMEN REQUIRING INSTRUMENTATION, UNILATERAL: ICD-10-PCS | Mod: S$GLB,,, | Performed by: OTOLARYNGOLOGY

## 2022-12-14 PROCEDURE — 1101F PT FALLS ASSESS-DOCD LE1/YR: CPT | Mod: CPTII,S$GLB,, | Performed by: OTOLARYNGOLOGY

## 2022-12-14 PROCEDURE — 3288F FALL RISK ASSESSMENT DOCD: CPT | Mod: CPTII,S$GLB,, | Performed by: OTOLARYNGOLOGY

## 2022-12-14 NOTE — PATIENT INSTRUCTIONS
Earwax (Treated)    Everyone produces earwax from the lining of the ear canal. It lubricates and protects the ear. The wax that forms in the canal slowly moves toward the outside of the ear and falls out. Sometimes wax can build up in the ear canal. This can cause a blockage and loss of hearing. A buildup of earwax was removed from your ear today.  Home care  If you have a tendency to build up wax in the ear canal, you should clear the wax at home regularly, before it causes discomfort. This should be about once every six months.  Unless a medicine was prescribed, you may use an over-the-counter product made for clearing earwax. These contain carbamide peroxide and are available over-the-counter in a kit with a small bulb syringe.  Lie down with the blocked ear facing upward. Apply one dropper full of medicine and wait a few minutes. Grasp the outer ear and wiggle it to help the solution enter the canal.  Lean over a sink or basin with the blocked ear turned downward. Use a rubber bulb syringe filled with warm (not hot or cold) water to rinse the ear several times. Use gentle pressure only. You may need to repeat the irrigation several times before the wax flows out.  If you are having trouble draining all the water out of your ear canal, put a few drops of rubbing alcohol into the ear canal. This will help remove the remaining water.  Don'ts  Dont use cold water to rinse the ear. This will make you dizzy.  Dont do this procedure if you have an ear infection. Symptoms include ear pain, fever, or fluid draining from the ear.  Dont do this procedure if you have a punctured eardrum.  Dont use cotton swabs, matches, hairpins, keys, or other objects to clean the ear canal. This can cause infection of the ear canal or rupture of the eardrum. Because of their size and shape, cotton swabs can push the earwax deeper into the ear canal instead of removing it.  Follow-up care  Follow up with your healthcare provider,  or as advised.  When to seek medical advice  Call your healthcare provider right away if any of these occur:  Worsening ear pain  Fever of 100.4°F (38°C) or higher, or as directed by your healthcare provider  Hearing does not return to normal after three days of treatment  Fluid drainage or bleeding from the ear canal  Swelling, redness, or tenderness of the outer ear  Headache, neck pain, or stiff neck  Date Last Reviewed: 3/22/2015  © 7610-2859 [employer]. [address] [place] [address]. All rights reserved. This information is not intended as a substitute for professional medical care. Always follow your healthcare professional's instructions.

## 2022-12-14 NOTE — PROGRESS NOTES
REFERRING PROVIDER  No referring provider defined for this encounter.  Subjective:   Patient: Erendira Pretty [id number], :1945   Visit date:2022 1:53 PM    Chief Complaint:  Otalgia (Bilateral )        HPI:     Erendira Pretty is a 77 y.o. male whom I am asked to see for evaluation of otalgia or hearing loss in both ears for the past 1-4 weeks.   Erendira rates the severity as moderate.  No exacerbating or relieving factors.  There is no drainage from the ears.      His meds, allergies, medical, surgical, social & family histories were reviewed & updated:  -     He has a current medication list which includes the following prescription(s): accu-chek lux plus meter, albuterol, amlodipine, aspirin, azelastine, blood sugar diagnostic, budesonide-formoterol 160-4.5 mcg, carvedilol, evolocumab, famotidine, finasteride, fluticasone propionate, furosemide, glucosamine/chondr leach a sod, jardiance, krill oil, lancets, lantus solostar u-100 insulin, levocetirizine, levothyroxine, lisinopril, metformin, montelukast, neomycin-polymyxin-dexamethasone, pen needle, diabetic, rabeprazole, and sildenafil, and the following Facility-Administered Medications: lactated ringers.  -     He  has a past medical history of Aortic stenosis, Asthma, Benign prostatic hyperplasia with nocturia, Bilateral carotid artery stenosis (2021), BPH (benign prostatic hyperplasia), CAD (coronary artery disease), Cirrhosis, Claudication (2014), Colon polyp, COPD (chronic obstructive pulmonary disease), ED (erectile dysfunction), Encounter for blood transfusion, Ex-smoker, Hearing loss NEC, Hepatitis C, Hyperlipidemia, Hypertension, Hypothyroid, Open angle with borderline findings and low glaucoma risk in both eyes (2020), DELONTE on CPAP, Osteoarthritis, PVD (peripheral vascular disease), Secondary esophageal varices without bleeding (2017), and Type 2 diabetes mellitus.   -     He does not have any pertinent problems on file.   -      He  has a past surgical history that includes Knee surgery (Right); Trigger finger release (Left); Carpal tunnel release (Left); Elbow bursa surgery (Right, 2010); Rectal surgery (2012); Eye surgery; Cataract extraction w/ intraocular lens  implant, bilateral (2008); Cardiac catheterization; Colonoscopy (8/2013); Colonoscopy (N/A, 5/30/2016); Transurethral resection of prostate (TURP) without use of laser (N/A, 6/19/2018); Coronary Artery Bypass Graft (CABG) (N/A, 11/5/2018); Endoscopic harvest of vein (Left, 11/5/2018); Cardiac surgery; Catheterization of both left and right heart (N/A, 11/2/2018); Functional endoscopic sinus surgery (FESS) (Bilateral, 3/27/2019); Frontal sinus obliteration (Bilateral, 3/27/2019); Maxillary antrostomy (Bilateral, 3/27/2019); Ethmoidectomy (Bilateral, 3/27/2019); Nasal septoplasty (N/A, 3/27/2019); Knee arthroscopy w/ meniscal repair (Left, 06/2019); Esophagogastroduodenoscopy (N/A, 7/17/2019); Colonoscopy (N/A, 7/17/2019); Computed tomography (N/A, 9/9/2019); Cataract extraction; Left heart catheterization (Left, 3/2/2020); Coronary bypass graft angiography (3/2/2020); Foot surgery; Computed tomography (N/A, 1/25/2022); and Liver biopsy (01/2022).  -     He  reports that he quit smoking about 50 years ago. He has a 2.00 pack-year smoking history. He quit smokeless tobacco use about 46 years ago.  His smokeless tobacco use included chew. He reports current alcohol use of about 2.0 standard drinks per week. He reports that he does not use drugs.  -     His family history includes Heart disease in his brother, father, and mother.  -     He is allergic to lipitor [atorvastatin], niacin preparations, statins-hmg-coa reductase inhibitors, iodinated contrast media, omeprazole, and prilosec [omeprazole magnesium].      Review of Systems:  -     Allergic/Immunologic: is allergic to lipitor [atorvastatin], niacin preparations, statins-hmg-coa reductase inhibitors, iodinated contrast media,  "omeprazole, and prilosec [omeprazole magnesium]..  -     Constitutional: Current temp: 97.5 °F (36.4 °C) (Temporal)        Objective:     Physical Exam:  Vitals:  /77   Pulse 78   Temp 97.5 °F (36.4 °C) (Temporal)   Ht 5' 8" (1.727 m)   Wt 107.8 kg (237 lb 10.5 oz)   BMI 36.14 kg/m²   Communication:  Able to communicate, no hoarseness.  Head & Face:  Normocephalic, atraumatic, no sinus tenderness.  Eyes:  Extraocular motions intact.  Ears:  Otoscopy of external auditory canals reveals impaction of bilateral ear canals.  With the patient in the supine position, we used the operating microscope to examine both ears with the appropriate sized ear speculum.  A variety of sterile, micro-instruments were utilized to remove the cerumen atraumatically from the impacted ear(s).   After removal, the ears were reexamined-  Right Ear:  No mass/lesion of auricle. The external auditory canals is without erythema or discharge. Pneumatic otoscopy of the tympanic membrane revealed no perforation and good mobility, with no fluid in middle ear. Clinical speech reception thresholds grossly normal.  Left Ear:  No mass/lesion of auricle. The external auditory canals is without erythema or discharge. Pneumatic otoscopy of the tympanic membrane revealed no perforation and good mobility, with no fluid in middle ear. Clinical speech reception thresholds grossly normal  Nose:  No masses/lesions of external nose, nasal mucosa, septum, and turbinates were within normal limits.  Mouth:  No mass/lesion of lips, teeth, gums, hard/soft palate, tongue, tonsils, or oropharynx.  Neck & Lymphatics:  No cervical lymphadenopathy, no neck mass/crepitus/ asymmetry, trachea is midline, no thyroid enlargement/tenderness/mass.  Neuro/Psych: Alert with normal mood and affect.   Respiration/Chest:  Symmetric expansion during respiration, normal respiratory effort.  Skin:  Warm and intact.    Assessment & Plan:       -     Cerumen Impaction - Erendira " has cerumen impaction.  We discussed preventative measures and treatment options.  Q-tips must be avoided, instead the ears can be cleaned with OTC ear rinses (or a mixture of alcohol & vinegar in equal parts).   For hard wax, Erendira may place mineral oil/baby oil in the ear with a cotton ball at night and remove in the shower.  This will assist in softening the wax and allow it to drain out on its own. If the cerumen impacts the ear canal and causes hearing loss or infection he needs to follow-up in the clinic for treatment and cleaning.

## 2022-12-22 NOTE — PROGRESS NOTES
Procedure instruction reviewed with pt. Place, time, nothing to eat or drink after midnight Wednesday 12/28/22, no chewing gum or sucking on hard candy, take BP medication with sip of water on am of procedure, may use inhaler if needed, do not take diabetic medication or any other medications on am of procedure, have someone to drive them home.  Pt verbalized understanding.

## 2022-12-29 ENCOUNTER — ANESTHESIA (OUTPATIENT)
Dept: ENDOSCOPY | Facility: HOSPITAL | Age: 77
End: 2022-12-29
Payer: MEDICARE

## 2022-12-29 ENCOUNTER — ANESTHESIA EVENT (OUTPATIENT)
Dept: ENDOSCOPY | Facility: HOSPITAL | Age: 77
End: 2022-12-29
Payer: MEDICARE

## 2022-12-29 ENCOUNTER — HOSPITAL ENCOUNTER (OUTPATIENT)
Facility: HOSPITAL | Age: 77
Discharge: HOME | End: 2022-12-29
Attending: INTERNAL MEDICINE | Admitting: INTERNAL MEDICINE
Payer: MEDICARE

## 2022-12-29 ENCOUNTER — TELEPHONE (OUTPATIENT)
Dept: INTERNAL MEDICINE | Facility: CLINIC | Age: 77
End: 2022-12-29
Payer: MEDICARE

## 2022-12-29 VITALS
BODY MASS INDEX: 36.88 KG/M2 | RESPIRATION RATE: 18 BRPM | HEART RATE: 73 BPM | DIASTOLIC BLOOD PRESSURE: 66 MMHG | TEMPERATURE: 99 F | WEIGHT: 235 LBS | SYSTOLIC BLOOD PRESSURE: 112 MMHG | HEIGHT: 67 IN | OXYGEN SATURATION: 96 %

## 2022-12-29 DIAGNOSIS — K74.69 OTHER CIRRHOSIS OF LIVER: ICD-10-CM

## 2022-12-29 LAB — POCT GLUCOSE: 130 MG/DL (ref 70–110)

## 2022-12-29 PROCEDURE — 25000003 PHARM REV CODE 250: Performed by: NURSE ANESTHETIST, CERTIFIED REGISTERED

## 2022-12-29 PROCEDURE — 43239 PR EGD, FLEX, W/BIOPSY, SGL/MULTI: ICD-10-PCS | Mod: ,,, | Performed by: INTERNAL MEDICINE

## 2022-12-29 PROCEDURE — 63600175 PHARM REV CODE 636 W HCPCS: Performed by: NURSE ANESTHETIST, CERTIFIED REGISTERED

## 2022-12-29 PROCEDURE — 37000008 HC ANESTHESIA 1ST 15 MINUTES: Performed by: INTERNAL MEDICINE

## 2022-12-29 PROCEDURE — 88342 IMHCHEM/IMCYTCHM 1ST ANTB: CPT | Mod: 26,,, | Performed by: PATHOLOGY

## 2022-12-29 PROCEDURE — 88305 TISSUE EXAM BY PATHOLOGIST: ICD-10-PCS | Mod: 26,,, | Performed by: PATHOLOGY

## 2022-12-29 PROCEDURE — 88305 TISSUE EXAM BY PATHOLOGIST: CPT | Performed by: PATHOLOGY

## 2022-12-29 PROCEDURE — 88305 TISSUE EXAM BY PATHOLOGIST: CPT | Mod: 26,,, | Performed by: PATHOLOGY

## 2022-12-29 PROCEDURE — 43239 EGD BIOPSY SINGLE/MULTIPLE: CPT | Mod: ,,, | Performed by: INTERNAL MEDICINE

## 2022-12-29 PROCEDURE — 27201012 HC FORCEPS, HOT/COLD, DISP: Performed by: INTERNAL MEDICINE

## 2022-12-29 PROCEDURE — 88342 CHG IMMUNOCYTOCHEMISTRY: ICD-10-PCS | Mod: 26,,, | Performed by: PATHOLOGY

## 2022-12-29 PROCEDURE — 43239 EGD BIOPSY SINGLE/MULTIPLE: CPT | Performed by: INTERNAL MEDICINE

## 2022-12-29 RX ORDER — PROPOFOL 10 MG/ML
VIAL (ML) INTRAVENOUS
Status: DISCONTINUED | OUTPATIENT
Start: 2022-12-29 | End: 2022-12-29

## 2022-12-29 RX ORDER — LIDOCAINE HYDROCHLORIDE 10 MG/ML
INJECTION, SOLUTION EPIDURAL; INFILTRATION; INTRACAUDAL; PERINEURAL
Status: DISCONTINUED | OUTPATIENT
Start: 2022-12-29 | End: 2022-12-29

## 2022-12-29 RX ADMIN — LIDOCAINE HYDROCHLORIDE 50 MG: 10 INJECTION, SOLUTION EPIDURAL; INFILTRATION; INTRACAUDAL; PERINEURAL at 09:12

## 2022-12-29 RX ADMIN — PROPOFOL 100 MG: 10 INJECTION, EMULSION INTRAVENOUS at 09:12

## 2022-12-29 RX ADMIN — PROPOFOL 50 MG: 10 INJECTION, EMULSION INTRAVENOUS at 09:12

## 2022-12-29 NOTE — H&P
Short Stay Endoscopy History and Physical    PCP - FRANCIE Callaway Jr, MD  [place] - [place]  [id number] [facility]  [facility]  [place] [address]    Procedure - Endoscopy  ASA - per anesthesia  Mallampati - per anesthesia  History of Anesthesia problems - no  Family history Anesthesia problems -  no   Plan of anesthesia - General    HPI  77 y.o. male  Reason for procedure: Variceal surveillance and GERD      ROS:  Constitutional: No fevers, chills, No weight loss  CV: No chest pain  Pulm: No cough, No shortness of breath  GI: see HPI    Medical History:  has a past medical history of Aortic stenosis, Asthma, Benign prostatic hyperplasia with nocturia, Bilateral carotid artery stenosis (7/21/2021), BPH (benign prostatic hyperplasia), CAD (coronary artery disease), Cirrhosis, Claudication (4/9/2014), Colon polyp, COPD (chronic obstructive pulmonary disease), ED (erectile dysfunction), Encounter for blood transfusion, Ex-smoker, Hearing loss NEC, Hepatitis C, Hyperlipidemia, Hypertension, Hypothyroid, Open angle with borderline findings and low glaucoma risk in both eyes (9/22/2020), DELONTE on CPAP, Osteoarthritis, PVD (peripheral vascular disease), Secondary esophageal varices without bleeding (6/26/2017), and Type 2 diabetes mellitus.    Surgical History:  has a past surgical history that includes Knee surgery (Right); Trigger finger release (Left); Carpal tunnel release (Left); Elbow bursa surgery (Right, 2010); Rectal surgery (2012); Eye surgery; Cataract extraction w/ intraocular lens  implant, bilateral (2008); Cardiac catheterization; Colonoscopy (8/2013); Colonoscopy (N/A, 5/30/2016); Transurethral resection of prostate (TURP) without use of laser (N/A, 6/19/2018); Coronary Artery Bypass Graft (CABG) (N/A, 11/5/2018); Endoscopic harvest of vein (Left, 11/5/2018); Cardiac surgery; Catheterization of both left and right heart (N/A, 11/2/2018); Functional endoscopic sinus surgery (FESS)  (Bilateral, 3/27/2019); Frontal sinus obliteration (Bilateral, 3/27/2019); Maxillary antrostomy (Bilateral, 3/27/2019); Ethmoidectomy (Bilateral, 3/27/2019); Nasal septoplasty (N/A, 3/27/2019); Knee arthroscopy w/ meniscal repair (Left, 06/2019); Esophagogastroduodenoscopy (N/A, 7/17/2019); Colonoscopy (N/A, 7/17/2019); Computed tomography (N/A, 9/9/2019); Cataract extraction; Left heart catheterization (Left, 3/2/2020); Coronary bypass graft angiography (3/2/2020); Foot surgery; Computed tomography (N/A, 1/25/2022); and Liver biopsy (01/2022).    Family History: family history includes Heart disease in his brother, father, and mother..    Social History:  reports that he quit smoking about 50 years ago. His smoking use included cigarettes. He has a 2.00 pack-year smoking history. He quit smokeless tobacco use about 46 years ago.  His smokeless tobacco use included chew. He reports current alcohol use of about 2.0 standard drinks per week. He reports that he does not use drugs.    Review of patient's allergies indicates:   Allergen Reactions    Lipitor [atorvastatin] Other (See Comments)     Muscle aches and pains as well as fatigue.     Niacin preparations Other (See Comments)     Causes broken blood vessels and bruises at 4 times normal dose.    Statins-hmg-coa reductase inhibitors Other (See Comments)     Statins cause intolerable myalgias.    Iodinated contrast media Hives     Tachycardia    Omeprazole Hives and Itching    Prilosec [omeprazole magnesium] Hives and Itching       Medications:   Medications Prior to Admission   Medication Sig Dispense Refill Last Dose    albuterol (VENTOLIN HFA) 90 mcg/actuation inhaler Inhale 2 puffs into the lungs every 6 (six) hours as needed for Wheezing or Shortness of Breath. Rescue 18 g 3 Past Week    amLODIPine (NORVASC) 10 MG tablet TAKE 1 TABLET BY MOUTH ONCE A DAY (DOSE INCREASE) 30 tablet 11 12/29/2022    azelastine (ASTELIN) 137 mcg (0.1 %) nasal spray 2 sprays (274  mcg total) by Nasal route 2 (two) times daily. 30 mL 6 12/28/2022    budesonide-formoterol 160-4.5 mcg (SYMBICORT) 160-4.5 mcg/actuation HFAA INHALE 2 PUFFS INTO THE LUNGS TWO TIMES A DAY. 10.2 g 11 12/29/2022    carvediloL (COREG) 6.25 MG tablet TAKE 1 TABLET BY MOUTH TWICE DAILY WITH MEALS 60 tablet 5 12/29/2022    evolocumab (REPATHA SYRINGE) 140 mg/mL Syrg Inject 140 mg into the skin every 14 (fourteen) days. 2 each 11 Past Month    famotidine (PEPCID) 40 MG tablet Take 1 tablet (40 mg total) by mouth every evening. 30 tablet 11 12/28/2022    finasteride (PROSCAR) 5 mg tablet Take 1 tablet (5 mg total) by mouth once daily. 90 tablet 3 12/28/2022    fluticasone propionate (FLONASE) 50 mcg/actuation nasal spray 2 sprays (100 mcg total) by Each Nostril route once daily. 18.2 mL 6 12/28/2022    furosemide (LASIX) 40 MG tablet Take 1 tablet (40 mg total) by mouth 2 (two) times daily. 60 tablet 11 12/28/2022    JARDIANCE 25 mg tablet TAKE 1 TABLET BY MOUTH ONCE DAILY 90 tablet 0 12/28/2022    krill oil 500 mg Cap Take by mouth.   12/28/2022    LANTUS SOLOSTAR U-100 INSULIN glargine 100 units/mL SubQ pen INJECT 44 UNITS UNDER THE SKIN EVERY EVENING 45 mL 0 12/28/2022    levocetirizine (XYZAL) 5 MG tablet Take 1 tablet (5 mg total) by mouth every evening. 30 tablet 11 12/28/2022    levothyroxine (SYNTHROID) 150 MCG tablet Take 1 tablet (150 mcg total) by mouth before breakfast. 30 tablet 11 12/28/2022    lisinopriL 10 MG tablet TAKE 1 BY MOUTH EVERY DAY 30 tablet 7 12/29/2022    metFORMIN (GLUCOPHAGE) 500 MG tablet TAKE 1 TABLET BY MOUTH TWICE DAILY WITH MEALS 180 tablet 3 12/28/2022    montelukast (SINGULAIR) 10 mg tablet TAKE 1 TABLET BY MOUTH ONCE DAILY 30 tablet 11 12/28/2022    RABEprazole (ACIPHEX) 20 mg tablet Take 1 tablet (20 mg total) by mouth 2 (two) times daily. 60 tablet 11 12/28/2022    ACCU-CHEK GRACE PLUS METER [facility] AS DIRECTED 1 each 0     aspirin (ECOTRIN) 81 MG EC tablet TAKE 1 TABLET BY MOUTH EVERY  "DAY 90 tablet 4     blood sugar diagnostic (ACCU-CHEK GRACE PLUS TEST STRP) Strp TEST THREE TIMES A  strip 11     GLUCOSAMINE HCL/CHONDR ROSARIO A NA (OSTEO BI-FLEX ORAL) Take 1 tablet by mouth 2 (two) times daily.        lancets (ACCU-CHEK FASTCLIX LANCET DRUM) Misc TEST TWO TIMES A  each 5     neomycin-polymyxin-dexamethasone (DEXACINE) 3.5 mg/g-10,000 unit/g-0.1 % Oint Place into both eyes 2 (two) times a day. Apply to lid margins 3.5 g 0     pen needle, diabetic (BD ULTRA-FINE MINI PEN NEEDLE) 31 gauge x 3/16" Ndle USE AS DIRECTED 100 each 11     sildenafiL (VIAGRA) 100 MG tablet Take 1/2 to 1 tablet by mouth one hour prior to intercourse. Max 100mg per day 30 tablet 1        Physical Exam:    Vital Signs:   Vitals:    12/29/22 0843   BP: (!) 144/72   Pulse: 88   Resp: 19   Temp: 99 °F (37.2 °C)       General Appearance: Well appearing in no acute distress  Abdomen: Soft, non tender, non distended with normal bowel sounds, no masses    Labs:  Lab Results   Component Value Date    WBC 5.22 08/29/2022    HGB 15.1 08/29/2022    HCT 43.3 08/29/2022     08/29/2022    CHOL 181 12/20/2021    TRIG 151 (H) 12/20/2021    HDL 50 12/20/2021    ALT 27 08/29/2022    AST 28 08/29/2022     08/29/2022    K 4.0 08/29/2022     08/29/2022    CREATININE 1.2 08/29/2022    BUN 13 08/29/2022    CO2 27 08/29/2022    TSH 12.112 (H) 10/26/2022    PSA 0.14 06/28/2022    INR 1.0 08/29/2022    GLUF 130 (H) 05/20/2009    HGBA1C 6.9 (H) 06/28/2022       I have explained the risks and benefits of this endoscopic procedure to the patient including but not limited to bleeding, inflammation, infection, perforation, and death.    SEDATION PLAN: per anesthesia      History reviewed, vital signs satisfactory, cardiopulmonary status satisfactory, sedation options, risks and plans have been discussed with the patient  All their questions were answered and the patient agrees to the sedation procedures as planned and the " patient is deemed an appropriate candidate for the sedation as planned.    Procedure explained to patient, informed consent obtained and placed in chart.        Issa Gayle MD

## 2022-12-29 NOTE — TRANSFER OF CARE
"Anesthesia Transfer of Care Note    Patient: Erendira Pretty    Procedure(s) Performed: Procedure(s) (LRB):  ESOPHAGOGASTRODUODENOSCOPY (EGD) (N/A)    Patient location: GI    Anesthesia Type: MAC    Transport from OR: Transported from OR on room air with adequate spontaneous ventilation    Post pain: adequate analgesia    Post assessment: no apparent anesthetic complications    Post vital signs: stable    Level of consciousness: awake and alert    Nausea/Vomiting: no nausea/vomiting    Complications: none    Transfer of care protocol was followed      Last vitals:   Visit Vitals  BP (!) 144/72 (BP Location: Left arm, Patient Position: Sitting)   Pulse 88   Temp 37.2 °C (99 °F)   Resp 19   Ht 5' 7" (1.702 m)   Wt 106.6 kg (235 lb)   SpO2 98%   BMI 36.81 kg/m²     "

## 2022-12-29 NOTE — PROVATION PATIENT INSTRUCTIONS
Discharge Summary/Instructions after an Endoscopic Procedure  Patient Name: Erendira Pretty  Patient MRN: [id number]  Patient YOB: 1945 Thursday, December 29, 2022 Issa Gayle MD  Dear patient,  As a result of recent federal legislation (The Federal Cures Act), you may   receive lab or pathology results from your procedure in your [facility]   account before your physician is able to contact you. Your physician or   their representative will relay the results to you with their   recommendations at their soonest availability.  Thank you,  RESTRICTIONS:  During your procedure today, you received medications for sedation.  These   medications may affect your judgment, balance and coordination.  Therefore,   for 24 hours, you have the following restrictions:   - DO NOT drive a car, operate machinery, make legal/financial decisions,   sign important papers or drink alcohol.    ACTIVITY:  Today: no heavy lifting, straining or running due to procedural   sedation/anesthesia.  The following day: return to full activity including work.  DIET:  Eat and drink normally unless instructed otherwise.     TREATMENT FOR COMMON SIDE EFFECTS:  - Mild abdominal pain, nausea, belching, bloating or excessive gas:  rest,   eat lightly and use a heating pad.  - Sore Throat: treat with throat lozenges and/or gargle with warm salt   water.  - Because air was used during the procedure, expelling large amounts of air   from your rectum or belching is normal.  - If a bowel prep was taken, you may not have a bowel movement for 1-3 days.    This is normal.  SYMPTOMS TO WATCH FOR AND REPORT TO YOUR PHYSICIAN:  1. Abdominal pain or bloating, other than gas cramps.  2. Chest pain.  3. Back pain.  4. Signs of infection such as: chills or fever occurring within 24 hours   after the procedure.  5. Rectal bleeding, which would show as bright red, maroon, or black stools.   (A tablespoon of blood from the rectum is not serious, especially  if   hemorrhoids are present.)  6. Vomiting.  7. Weakness or dizziness.  GO DIRECTLY TO THE NEAREST EMERGENCY ROOM IF YOU HAVE ANY OF THE FOLLOWING:      Difficulty breathing              Chills and/or fever over 101 F   Persistent vomiting and/or vomiting blood   Severe abdominal pain   Severe chest pain   Black, tarry stools   Bleeding- more than one tablespoon   Any other symptom or condition that you feel may need urgent attention  Your doctor recommends these additional instructions:  If any biopsies were taken, your doctors clinic will contact you in 1 to 2   weeks with any results.  - Discharge patient to home (via wheelchair).   - Resume previous diet.   - Continue present medications.   - Repeat upper endoscopy in 2 years for surveillance.   - Return to referring physician as previously scheduled.  For questions, problems or results please call your physician Issa Gayle MD at Work:  [phone]  If you have any questions about the above instructions, call the GI   department at [phone] or call the endoscopy unit at [phone]   from 7am until 3 pm.  [facility] [place], EMERGENCY ROOM PHONE NUMBER:   [phone]  IF A COMPLICATION OR EMERGENCY SITUATION ARISES AND YOU ARE UNABLE TO REACH   YOUR PHYSICIAN - GO DIRECTLY TO THE EMERGENCY ROOM.  I have read or have had read to me these discharge instructions for my   procedure and have received a written copy.  I understand these   instructions and will follow-up with my physician if I have any questions.     __________________________________       _____________________________________  Nurse Signature                                          Patient/Designated   Responsible Party Signature  MD Issa Duncan MD  12/29/2022 9:48:13 AM  This report has been verified and signed electronically.  Dear patient,  As a result of recent federal legislation (The Federal Cures Act), you may    receive lab or pathology results from your procedure in your [facility]   account before your physician is able to contact you. Your physician or   their representative will relay the results to you with their   recommendations at their soonest availability.  Thank you,  PROVATION

## 2022-12-29 NOTE — ANESTHESIA POSTPROCEDURE EVALUATION
Anesthesia Post Evaluation    Patient: Erendira Pretty    Procedure(s) Performed: Procedure(s) (LRB):  ESOPHAGOGASTRODUODENOSCOPY (EGD) (N/A)    Final Anesthesia Type: MAC      Patient location during evaluation: GI PACU  Patient participation: Yes- Able to Participate  Level of consciousness: awake and alert  Post-procedure vital signs: reviewed and stable  Pain management: adequate  Airway patency: patent  DELONTE mitigation strategies: Multimodal analgesia  PONV status at discharge: No PONV  Anesthetic complications: no      Cardiovascular status: blood pressure returned to baseline  Respiratory status: unassisted and spontaneous ventilation  Hydration status: euvolemic  Follow-up not needed.          Vitals Value Taken Time   /66 12/29/22 1006   Temp  12/29/22 1048   Pulse 73 12/29/22 1006   Resp 18 12/29/22 1006   SpO2 96 % 12/29/22 1006         Event Time   Out of Recovery 10:07:00         Pain/Natalie Score: Natalie Score: 10 (12/29/2022 10:06 AM)

## 2022-12-29 NOTE — DISCHARGE SUMMARY
[facility] - Endoscopy (Hospital)  Discharge Note  Short Stay    Procedure(s) (LRB):  ESOPHAGOGASTRODUODENOSCOPY (EGD) (N/A)      OUTCOME: Patient tolerated treatment/procedure well without complication and is now ready for discharge.    DISPOSITION: Home or Self Care    FINAL DIAGNOSIS:  Other cirrhosis of liver    FOLLOWUP: With primary care provider    DISCHARGE INSTRUCTIONS:  No discharge procedures on file.     \

## 2022-12-29 NOTE — ANESTHESIA PREPROCEDURE EVALUATION
12/29/2022  Erendira Pretty is a 77 y.o., male.    Pre-op Assessment    I have reviewed the Patient Summary Reports.    I have reviewed the Nursing Notes. I have reviewed the NPO Status.   I have reviewed the Medications.     Review of Systems  Anesthesia Hx:  No problems with previous Anesthesia  Denies Family Hx of Anesthesia complications.   Denies Personal Hx of Anesthesia complications.   Social:  Former Smoker, Alcohol Use    Hematology/Oncology:         -- Anemia: Oncology Comments: HCC (hepatocellular carcinoma    EENT/Dental:   Glaucoma  Hearing loss   Cardiovascular:   Hypertension Valvular problems/Murmurs, AS Denies MI. CAD   CABG/stent Dysrhythmias atrial fibrillation Angina  Denies CHF. PVD hyperlipidemia ECG has been reviewed. ekg 3/2020:  Sinus rhythm with 1st degree A-V block with Premature atrial complexes 74  Minimal voltage criteria for LVH, may be normal variant   Cannot rule out Anterior infarct ,age undetermined   Abnormal ECG   When compared with ECG of 02-MAR-2020 05:12,   No significant change was found    Echo 12/2018:  1 - Concentric hypertrophy.     2 - No wall motion abnormalities.     3 - Normal left ventricular systolic function (EF 60-65%).     4 - Indeterminate LV diastolic function.     5 - Normal right ventricular systolic function .     6 - The estimated PA systolic pressure is greater than 33 mmHg.     7 - Mild tricuspid regurgitation.   Poor imaging quality    S/P CABG x 2  Normal myocardial perfusion scan. There is no evidence of myocardial ischemia or infarction.    The gated perfusion images showed an ejection fraction of 69% at rest. The gated perfusion images showed an ejection fraction of 61% post stress. Normal ejection fraction is greater than 54%.    There is normal wall motion at rest and post stress.    LV cavity size is normal at rest and normal at  stress.    The EKG portion of this study is abnormal but not diagnostic.    The patient reported no chest pain during the stress test.    Arrhythmias during stress: PACs.   Pulmonary:   COPD Asthma Sleep Apnea Asbestos exposure   Education provided regarding risk of obstructive sleep apnea     Renal/:   Chronic Renal Disease, CRI BPH    Hepatic/GI:   GERD Liver Disease, Hepatitis, C Cirrhosis  Secondary esophageal varices without bleeding   Musculoskeletal:   Arthritis     Neurological:   Denies CVA. Denies Seizures.    Endocrine:   Diabetes, type 2 Hypothyroidism        Physical Exam  General:  Obesity      Airway/Jaw/Neck:  Airway Findings: Mouth Opening: Normal   Tongue: Normal   General Airway Assessment: Adult Mallampati: II      Dental:  Dental Findings: Upper Dentures     Chest/Lungs:  Chest/Lungs Findings: Clear to auscultation, Normal Respiratory Rate      Heart/Vascular:  Heart Findings: Rate: Normal  Rhythm: Regular Rhythm             Anesthesia Plan  Type of Anesthesia, risks & benefits discussed:  Anesthesia Type:  MAC    Patient's Preference:   Plan Factors:          Intra-op Monitoring Plan: standard ASA monitors  Intra-op Monitoring Plan Comments:   Post Op Pain Control Plan: IV/PO Opioids PRN  Post Op Pain Control Plan Comments:     Induction:   IV  Beta Blocker:  Patient is on a Beta-Blocker and has received one dose within the past 24 hours (No further documentation required).       Informed Consent: Informed consent signed with the Patient and all parties understand the risks and agree with anesthesia plan.  All questions answered.  Anesthesia consent signed with patient.  ASA Score: 3     Day of Surgery Review of History & Physical: I have interviewed and examined the patient. I have reviewed the patient's H&P dated:  There are no significant changes.            Ready For Surgery From Anesthesia Perspective.           Physical Exam  General: Obesity    Airway:  Mallampati: II   Mouth  Opening: Normal  Tongue: Normal    Dental:  Upper Dentures    Chest/Lungs:  Clear to auscultation, Normal Respiratory Rate    Heart:  Rate: Normal  Rhythm: Regular Rhythm          Anesthesia Plan  Type of Anesthesia, risks & benefits discussed:    Anesthesia Type: MAC  Intra-op Monitoring Plan: standard ASA monitors  Post Op Pain Control Plan: IV/PO Opioids PRN  Induction:  IV  Informed Consent: Informed consent signed with the Patient and all parties understand the risks and agree with anesthesia plan.  All questions answered.   ASA Score: 3  Day of Surgery Review of History & Physical: I have interviewed and examined the patient. I have reviewed the patient's H&P dated:     Ready For Surgery From Anesthesia Perspective.       .

## 2023-01-09 ENCOUNTER — LAB VISIT (OUTPATIENT)
Dept: LAB | Facility: HOSPITAL | Age: 78
End: 2023-01-09
Attending: PEDIATRICS
Payer: MEDICARE

## 2023-01-09 ENCOUNTER — PATIENT MESSAGE (OUTPATIENT)
Dept: HEPATOLOGY | Facility: CLINIC | Age: 78
End: 2023-01-09
Payer: MEDICARE

## 2023-01-09 DIAGNOSIS — Z79.4 TYPE 2 DIABETES MELLITUS WITH CHRONIC KIDNEY DISEASE, WITH LONG-TERM CURRENT USE OF INSULIN, UNSPECIFIED CKD STAGE: Chronic | ICD-10-CM

## 2023-01-09 DIAGNOSIS — E03.9 ACQUIRED HYPOTHYROIDISM: Chronic | ICD-10-CM

## 2023-01-09 DIAGNOSIS — E11.22 TYPE 2 DIABETES MELLITUS WITH CHRONIC KIDNEY DISEASE, WITH LONG-TERM CURRENT USE OF INSULIN, UNSPECIFIED CKD STAGE: Chronic | ICD-10-CM

## 2023-01-09 LAB
ALBUMIN SERPL BCP-MCNC: 3.7 G/DL (ref 3.5–5.2)
ALP SERPL-CCNC: 83 U/L (ref 55–135)
ALT SERPL W/O P-5'-P-CCNC: 21 U/L (ref 10–44)
ANION GAP SERPL CALC-SCNC: 11 MMOL/L (ref 8–16)
AST SERPL-CCNC: 24 U/L (ref 10–40)
BILIRUB SERPL-MCNC: 0.6 MG/DL (ref 0.1–1)
BUN SERPL-MCNC: 15 MG/DL (ref 8–23)
CALCIUM SERPL-MCNC: 9.8 MG/DL (ref 8.7–10.5)
CHLORIDE SERPL-SCNC: 102 MMOL/L (ref 95–110)
CHOLEST SERPL-MCNC: 139 MG/DL (ref 120–199)
CHOLEST SERPL-MCNC: 139 MG/DL (ref 120–199)
CHOLEST/HDLC SERPL: 2.9 {RATIO} (ref 2–5)
CHOLEST/HDLC SERPL: 2.9 {RATIO} (ref 2–5)
CO2 SERPL-SCNC: 27 MMOL/L (ref 23–29)
CREAT SERPL-MCNC: 1.1 MG/DL (ref 0.5–1.4)
EST. GFR  (NO RACE VARIABLE): >60 ML/MIN/1.73 M^2
ESTIMATED AVG GLUCOSE: 137 MG/DL (ref 68–131)
FINAL PATHOLOGIC DIAGNOSIS: NORMAL
GLUCOSE SERPL-MCNC: 122 MG/DL (ref 70–110)
HBA1C MFR BLD: 6.4 % (ref 4–5.6)
HDLC SERPL-MCNC: 48 MG/DL (ref 40–75)
HDLC SERPL-MCNC: 48 MG/DL (ref 40–75)
HDLC SERPL: 34.5 % (ref 20–50)
HDLC SERPL: 34.5 % (ref 20–50)
LDLC SERPL CALC-MCNC: 56.6 MG/DL (ref 63–159)
LDLC SERPL CALC-MCNC: 56.6 MG/DL (ref 63–159)
Lab: NORMAL
NONHDLC SERPL-MCNC: 91 MG/DL
NONHDLC SERPL-MCNC: 91 MG/DL
POTASSIUM SERPL-SCNC: 4.4 MMOL/L (ref 3.5–5.1)
PROT SERPL-MCNC: 7.9 G/DL (ref 6–8.4)
SODIUM SERPL-SCNC: 140 MMOL/L (ref 136–145)
T4 FREE SERPL-MCNC: 0.94 NG/DL (ref 0.71–1.51)
T4 SERPL-MCNC: 5.8 UG/DL (ref 4.5–11.5)
TRIGL SERPL-MCNC: 172 MG/DL (ref 30–150)
TRIGL SERPL-MCNC: 172 MG/DL (ref 30–150)
TSH SERPL DL<=0.005 MIU/L-ACNC: 7.76 UIU/ML (ref 0.4–4)

## 2023-01-09 PROCEDURE — 84439 ASSAY OF FREE THYROXINE: CPT | Performed by: NURSE PRACTITIONER

## 2023-01-09 PROCEDURE — 36415 COLL VENOUS BLD VENIPUNCTURE: CPT | Mod: PO | Performed by: PEDIATRICS

## 2023-01-09 PROCEDURE — 84436 ASSAY OF TOTAL THYROXINE: CPT | Mod: XB | Performed by: NURSE PRACTITIONER

## 2023-01-09 PROCEDURE — 80053 COMPREHEN METABOLIC PANEL: CPT | Performed by: PEDIATRICS

## 2023-01-09 PROCEDURE — 83036 HEMOGLOBIN GLYCOSYLATED A1C: CPT | Performed by: PEDIATRICS

## 2023-01-09 PROCEDURE — 84443 ASSAY THYROID STIM HORMONE: CPT | Performed by: NURSE PRACTITIONER

## 2023-01-09 PROCEDURE — 80061 LIPID PANEL: CPT | Performed by: PEDIATRICS

## 2023-01-11 ENCOUNTER — OFFICE VISIT (OUTPATIENT)
Dept: INTERNAL MEDICINE | Facility: CLINIC | Age: 78
End: 2023-01-11
Payer: MEDICARE

## 2023-01-11 VITALS
OXYGEN SATURATION: 96 % | TEMPERATURE: 98 F | WEIGHT: 238.56 LBS | BODY MASS INDEX: 37.44 KG/M2 | DIASTOLIC BLOOD PRESSURE: 70 MMHG | HEIGHT: 67 IN | SYSTOLIC BLOOD PRESSURE: 112 MMHG | HEART RATE: 81 BPM

## 2023-01-11 DIAGNOSIS — N40.1 BENIGN PROSTATIC HYPERPLASIA WITH NOCTURIA: Chronic | ICD-10-CM

## 2023-01-11 DIAGNOSIS — R35.1 BENIGN PROSTATIC HYPERPLASIA WITH NOCTURIA: Chronic | ICD-10-CM

## 2023-01-11 DIAGNOSIS — I48.0 PAROXYSMAL ATRIAL FIBRILLATION: ICD-10-CM

## 2023-01-11 DIAGNOSIS — I10 ESSENTIAL HYPERTENSION: Chronic | ICD-10-CM

## 2023-01-11 DIAGNOSIS — E11.29 TYPE 2 DIABETES MELLITUS WITH MICROALBUMINURIA, WITH LONG-TERM CURRENT USE OF INSULIN: Chronic | ICD-10-CM

## 2023-01-11 DIAGNOSIS — R80.9 TYPE 2 DIABETES MELLITUS WITH MICROALBUMINURIA, WITH LONG-TERM CURRENT USE OF INSULIN: Chronic | ICD-10-CM

## 2023-01-11 DIAGNOSIS — Z79.4 TYPE 2 DIABETES MELLITUS WITH MICROALBUMINURIA, WITH LONG-TERM CURRENT USE OF INSULIN: Chronic | ICD-10-CM

## 2023-01-11 DIAGNOSIS — E78.2 MIXED HYPERLIPIDEMIA: ICD-10-CM

## 2023-01-11 DIAGNOSIS — E03.9 ACQUIRED HYPOTHYROIDISM: Primary | Chronic | ICD-10-CM

## 2023-01-11 PROCEDURE — 1160F PR REVIEW ALL MEDS BY PRESCRIBER/CLIN PHARMACIST DOCUMENTED: ICD-10-PCS | Mod: CPTII,S$GLB,, | Performed by: NURSE PRACTITIONER

## 2023-01-11 PROCEDURE — 99999 PR PBB SHADOW E&M-EST. PATIENT-LVL V: ICD-10-PCS | Mod: PBBFAC,,, | Performed by: NURSE PRACTITIONER

## 2023-01-11 PROCEDURE — 3074F SYST BP LT 130 MM HG: CPT | Mod: CPTII,S$GLB,, | Performed by: NURSE PRACTITIONER

## 2023-01-11 PROCEDURE — 3078F DIAST BP <80 MM HG: CPT | Mod: CPTII,S$GLB,, | Performed by: NURSE PRACTITIONER

## 2023-01-11 PROCEDURE — 99999 PR PBB SHADOW E&M-EST. PATIENT-LVL V: CPT | Mod: PBBFAC,,, | Performed by: NURSE PRACTITIONER

## 2023-01-11 PROCEDURE — 1159F PR MEDICATION LIST DOCUMENTED IN MEDICAL RECORD: ICD-10-PCS | Mod: CPTII,S$GLB,, | Performed by: NURSE PRACTITIONER

## 2023-01-11 PROCEDURE — 3288F FALL RISK ASSESSMENT DOCD: CPT | Mod: CPTII,S$GLB,, | Performed by: NURSE PRACTITIONER

## 2023-01-11 PROCEDURE — 3074F PR MOST RECENT SYSTOLIC BLOOD PRESSURE < 130 MM HG: ICD-10-PCS | Mod: CPTII,S$GLB,, | Performed by: NURSE PRACTITIONER

## 2023-01-11 PROCEDURE — 1157F ADVNC CARE PLAN IN RCRD: CPT | Mod: CPTII,S$GLB,, | Performed by: NURSE PRACTITIONER

## 2023-01-11 PROCEDURE — 1160F RVW MEDS BY RX/DR IN RCRD: CPT | Mod: CPTII,S$GLB,, | Performed by: NURSE PRACTITIONER

## 2023-01-11 PROCEDURE — 1101F PR PT FALLS ASSESS DOC 0-1 FALLS W/OUT INJ PAST YR: ICD-10-PCS | Mod: CPTII,S$GLB,, | Performed by: NURSE PRACTITIONER

## 2023-01-11 PROCEDURE — 99215 OFFICE O/P EST HI 40 MIN: CPT | Mod: S$GLB,,, | Performed by: NURSE PRACTITIONER

## 2023-01-11 PROCEDURE — 1157F PR ADVANCE CARE PLAN OR EQUIV PRESENT IN MEDICAL RECORD: ICD-10-PCS | Mod: CPTII,S$GLB,, | Performed by: NURSE PRACTITIONER

## 2023-01-11 PROCEDURE — 1101F PT FALLS ASSESS-DOCD LE1/YR: CPT | Mod: CPTII,S$GLB,, | Performed by: NURSE PRACTITIONER

## 2023-01-11 PROCEDURE — 3078F PR MOST RECENT DIASTOLIC BLOOD PRESSURE < 80 MM HG: ICD-10-PCS | Mod: CPTII,S$GLB,, | Performed by: NURSE PRACTITIONER

## 2023-01-11 PROCEDURE — 3288F PR FALLS RISK ASSESSMENT DOCUMENTED: ICD-10-PCS | Mod: CPTII,S$GLB,, | Performed by: NURSE PRACTITIONER

## 2023-01-11 PROCEDURE — 3072F LOW RISK FOR RETINOPATHY: CPT | Mod: CPTII,S$GLB,, | Performed by: NURSE PRACTITIONER

## 2023-01-11 PROCEDURE — 3072F PR LOW RISK FOR RETINOPATHY: ICD-10-PCS | Mod: CPTII,S$GLB,, | Performed by: NURSE PRACTITIONER

## 2023-01-11 PROCEDURE — 99215 PR OFFICE/OUTPT VISIT, EST, LEVL V, 40-54 MIN: ICD-10-PCS | Mod: S$GLB,,, | Performed by: NURSE PRACTITIONER

## 2023-01-11 PROCEDURE — 1159F MED LIST DOCD IN RCRD: CPT | Mod: CPTII,S$GLB,, | Performed by: NURSE PRACTITIONER

## 2023-01-11 RX ORDER — LEVOTHYROXINE SODIUM 175 UG/1
175 TABLET ORAL
Qty: 90 TABLET | Refills: 1 | Status: SHIPPED | OUTPATIENT
Start: 2023-01-11 | End: 2023-04-10

## 2023-01-11 NOTE — PROGRESS NOTES
HPI     Chief Complaint  Chief Complaint   Patient presents with    Follow-up       HPI  Erendira Pretty is a 77 y.o. male with multiple medical diagnoses as listed in the medical history and problem list that presents for Acquired Hypothyroidism. This patient is new to me.     Acquired Hypothyroidism: TSH is trending downward however remains significantly elevated. TSH reading three months ago 12.1. Down to 7.764 today. Synthroid increased to 150 mcg daily three months ago.  Pertinent negatives are chest pain/palpitations/tightness/discomfort, SOB, GI upset, urinary/bowel changes, unexplained weight loss/gain, dizziness/headaches/syncope. Vitals within acceptable limits today.    History     PAST MEDICAL HISTORY:  Past Medical History:   Diagnosis Date    Aortic stenosis     Asthma     Benign prostatic hyperplasia with nocturia     Bilateral carotid artery stenosis 7/21/2021    US CAROTID BILATERAL 07/14/2021 IMPRESSION: Atherosclerosis with suspected stenosis greater than 50% at the right proximal ICA visually. Velocities are discordant and appear improved from prior. Atherosclerosis on the left with no evidence of flow-limiting stenosis greater than 50%.  FINDINGS: Right: Internal Carotid Artery (ICA): Peak systolic velocity 118 cm/sec. End diastolic velocity 35 cm/sec    BPH (benign prostatic hyperplasia)     CAD (coronary artery disease)     40% lesion 2002;lazo    Cirrhosis     Claudication 4/9/2014    Colon polyp     COPD (chronic obstructive pulmonary disease)     ED (erectile dysfunction)     Encounter for blood transfusion     Ex-smoker     Hearing loss NEC     Hepatitis C     Cured;yuly brownoni 2015    Hyperlipidemia     Hypertension     Hypothyroid     s/p tx graves    Open angle with borderline findings and low glaucoma risk in both eyes 9/22/2020    DELONTE on CPAP     Osteoarthritis     PVD (peripheral vascular disease)     Secondary esophageal varices without bleeding 6/26/2017    Type 2  diabetes mellitus        PAST SURGICAL HISTORY:  Past Surgical History:   Procedure Laterality Date    CARDIAC CATHETERIZATION      CARDIAC SURGERY      CARPAL TUNNEL RELEASE Left     CATARACT EXTRACTION      CATARACT EXTRACTION W/ INTRAOCULAR LENS  IMPLANT, BILATERAL  2008    CATHETERIZATION OF BOTH LEFT AND RIGHT HEART N/A 11/2/2018    Procedure: CATHETERIZATION, HEART, BOTH LEFT AND RIGHT;  Surgeon: Frank Gallardo MD;  Location: [facility] CATH LAB;  Service: Cardiology;  Laterality: N/A;    COLONOSCOPY  8/2013    COLONOSCOPY N/A 5/30/2016    Procedure: COLONOSCOPY;  Surgeon: Anna Tomas MD;  Location: [facility] ENDO;  Service: Endoscopy;  Laterality: N/A;    COLONOSCOPY N/A 7/17/2019    Procedure: COLONOSCOPY;  Surgeon: David Carter III, MD;  Location: [facility] ENDO;  Service: Endoscopy;  Laterality: N/A;    COMPUTED TOMOGRAPHY N/A 9/9/2019    Procedure: CT (COMPUTED TOMOGRAPHY);  Surgeon: [facility];  Location: [facility] OR;  Service: General;  Laterality: N/A;  Microwave ablation to be done in CT.  Need CRNA and cart    COMPUTED TOMOGRAPHY N/A 1/25/2022    Procedure: Liver Microwave Ablation;  Surgeon: Red Puentes MD;  Location: [facility];  Service: General;  Laterality: N/A;    CORONARY ARTERY BYPASS GRAFT (CABG) N/A 11/5/2018    Procedure: CORONARY ARTERY BYPASS GRAFT (CABG);  Surgeon: Bear De Luna MD;  Location: [facility];  Service: Cardiothoracic;  Laterality: N/A;  TWO VESSEL BYPASS WITH AORTOTOMY AND EXCISION OF ATHEROSCLEROTIC PLAQUE    CORONARY BYPASS GRAFT ANGIOGRAPHY  3/2/2020    Procedure: Bypass graft study;  Surgeon: Cora Cormier MD;  Location: [facility] CATH LAB;  Service: Cardiology;;    ELBOW BURSA SURGERY Right 2010    ENDOSCOPIC HARVEST OF VEIN Left 11/5/2018    Procedure: SURGICAL PROCUREMENT, VEIN, ENDOSCOPIC;  Surgeon: Bear De Luna MD;  Location: [facility] OR;  Service: Cardiothoracic;  Laterality: Left;    ESOPHAGOGASTRODUODENOSCOPY N/A 7/17/2019    Procedure:  ESOPHAGOGASTRODUODENOSCOPY (EGD);  Surgeon: David Carter III, MD;  Location: [facility] ENDO;  Service: Endoscopy;  Laterality: N/A;    ESOPHAGOGASTRODUODENOSCOPY N/A 12/29/2022    Procedure: ESOPHAGOGASTRODUODENOSCOPY (EGD);  Surgeon: Issa Gayle MD;  Location: [facility] ENDO;  Service: Endoscopy;  Laterality: N/A;    ETHMOIDECTOMY Bilateral 3/27/2019    Procedure: ETHMOIDECTOMY;  Surgeon: Alejandro Parkinson MD;  Location: [facility] OR;  Service: ENT;  Laterality: Bilateral;    EYE SURGERY      FOOT SURGERY      FRONTAL SINUS OBLITERATION Bilateral 3/27/2019    Procedure: SINUSOTOMY, FRONTAL SINUS, OBLITERATIVE;  Surgeon: Alejandro Parkinson MD;  Location: [facility] OR;  Service: ENT;  Laterality: Bilateral;    FUNCTIONAL ENDOSCOPIC SINUS SURGERY (FESS) Bilateral 3/27/2019    Procedure: FESS (FUNCTIONAL ENDOSCOPIC SINUS SURGERY);  Surgeon: Alejandro Parkinson MD;  Location: [facility] OR;  Service: ENT;  Laterality: Bilateral;  Left Keila bullosa resection     KNEE ARTHROSCOPY W/ MENISCAL REPAIR Left 06/2019    dr boykin    KNEE SURGERY Right     LEFT HEART CATHETERIZATION Left 3/2/2020    Procedure: CATHETERIZATION, HEART, LEFT;  Surgeon: Cora Cormier MD;  Location: [facility] CATH LAB;  Service: Cardiology;  Laterality: Left;    LIVER BIOPSY  01/2022    MAXILLARY ANTROSTOMY Bilateral 3/27/2019    Procedure: MAXILLARY ANTROSTOMY;  Surgeon: Alejandro Parkinson MD;  Location: [facility] OR;  Service: ENT;  Laterality: Bilateral;    NASAL SEPTOPLASTY N/A 3/27/2019    Procedure: SEPTOPLASTY, NOSE;  Surgeon: Alejandro Parkinson MD;  Location: [facility] OR;  Service: ENT;  Laterality: N/A;    RECTAL SURGERY  2012    TRANSURETHRAL RESECTION OF PROSTATE (TURP) WITHOUT USE OF LASER N/A 6/19/2018    Procedure: TURP, WITHOUT USING LASER;  Surgeon: Cooper Cordova IV, MD;  Location: [facility];  Service: Urology;  Laterality: N/A;    TRIGGER FINGER RELEASE Left        SOCIAL HISTORY:  Social History     Socioeconomic History    Marital status:      Spouse name: YAEL    Number of  children: 2   Tobacco Use    Smoking status: Former     Packs/day: 0.50     Years: 4.00     Pack years: 2.00     Types: Cigarettes     Quit date: 1972     Years since quittin.6    Smokeless tobacco: Former     Types: Chew     Quit date: 1976   Substance and Sexual Activity    Alcohol use: Yes     Alcohol/week: 2.0 standard drinks     Types: 2 Cans of beer per week    Drug use: No    Sexual activity: Yes     Partners: Female   Social History Narrative    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       FAMILY HISTORY:  Family History   Problem Relation Age of Onset    Heart disease Mother     Heart disease Father     Heart disease Brother     Colon cancer Neg Hx        ALLERGIES AND MEDICATIONS: updated and reviewed.  Review of patient's allergies indicates:   Allergen Reactions    Lipitor [atorvastatin] Other (See Comments)     Muscle aches and pains as well as fatigue.     Niacin preparations Other (See Comments)     Causes broken blood vessels and bruises at 4 times normal dose.    Statins-hmg-coa reductase inhibitors Other (See Comments)     Statins cause intolerable myalgias.    Iodinated contrast media Hives     Tachycardia    Omeprazole Hives and Itching    Prilosec [omeprazole magnesium] Hives and Itching     Current Outpatient Medications   Medication Sig Dispense Refill    ACCU-CHEK GRACE PLUS METER Misc AS DIRECTED 1 each 0    albuterol (VENTOLIN HFA) 90 mcg/actuation inhaler Inhale 2 puffs into the lungs every 6 (six) hours as needed for Wheezing or Shortness of Breath. Rescue 18 g 3    amLODIPine (NORVASC) 10 MG tablet TAKE 1 TABLET BY MOUTH ONCE A DAY (DOSE INCREASE) 30 tablet 11    aspirin (ECOTRIN) 81 MG EC tablet TAKE 1 TABLET BY MOUTH EVERY DAY 90 tablet 4    azelastine (ASTELIN) 137 mcg (0.1 %) nasal spray 2 sprays (274 mcg total) by Nasal route 2 (two) times daily. 30 mL 6  "   blood sugar diagnostic (ACCU-CHEK GRACE PLUS TEST STRP) Strp TEST THREE TIMES A  strip 11    budesonide-formoterol 160-4.5 mcg (SYMBICORT) 160-4.5 mcg/actuation HFAA INHALE 2 PUFFS INTO THE LUNGS TWO TIMES A DAY. 10.2 g 11    carvediloL (COREG) 6.25 MG tablet TAKE 1 TABLET BY MOUTH TWICE DAILY WITH MEALS 60 tablet 5    evolocumab (REPATHA SYRINGE) 140 mg/mL Syrg Inject 140 mg into the skin every 14 (fourteen) days. 2 each 11    famotidine (PEPCID) 40 MG tablet Take 1 tablet (40 mg total) by mouth every evening. 30 tablet 11    finasteride (PROSCAR) 5 mg tablet Take 1 tablet (5 mg total) by mouth once daily. 90 tablet 3    fluticasone propionate (FLONASE) 50 mcg/actuation nasal spray 2 sprays (100 mcg total) by Each Nostril route once daily. 18.2 mL 6    furosemide (LASIX) 40 MG tablet Take 1 tablet (40 mg total) by mouth 2 (two) times daily. 60 tablet 11    GLUCOSAMINE HCL/CHONDR ROSARIO A NA (OSTEO BI-FLEX ORAL) Take 1 tablet by mouth 2 (two) times daily.       JARDIANCE 25 mg tablet TAKE 1 TABLET BY MOUTH ONCE DAILY 90 tablet 0    krill oil 500 mg Cap Take by mouth.      lancets (ACCU-CHEK FASTCLIX LANCET DRUM) Misc TEST TWO TIMES A  each 5    LANTUS SOLOSTAR U-100 INSULIN glargine 100 units/mL SubQ pen INJECT 44 UNITS UNDER THE SKIN EVERY EVENING 45 mL 0    levocetirizine (XYZAL) 5 MG tablet Take 1 tablet (5 mg total) by mouth every evening. 30 tablet 11    lisinopriL 10 MG tablet TAKE 1 BY MOUTH EVERY DAY 30 tablet 7    metFORMIN (GLUCOPHAGE) 500 MG tablet TAKE 1 TABLET BY MOUTH TWICE DAILY WITH MEALS 180 tablet 3    montelukast (SINGULAIR) 10 mg tablet TAKE 1 TABLET BY MOUTH ONCE DAILY 30 tablet 11    neomycin-polymyxin-dexamethasone (DEXACINE) 3.5 mg/g-10,000 unit/g-0.1 % Oint Place into both eyes 2 (two) times a day. Apply to lid margins 3.5 g 0    pen needle, diabetic (BD ULTRA-FINE MINI PEN NEEDLE) 31 gauge x 3/16" Ndle USE AS DIRECTED 100 each 11    RABEprazole (ACIPHEX) 20 mg tablet Take 1 " "tablet (20 mg total) by mouth 2 (two) times daily. 60 tablet 11    sildenafiL (VIAGRA) 100 MG tablet Take 1/2 to 1 tablet by mouth one hour prior to intercourse. Max 100mg per day 30 tablet 1    levothyroxine (SYNTHROID, LEVOTHROID) 175 MCG tablet Take 1 tablet (175 mcg total) by mouth before breakfast. 90 tablet 1     No current facility-administered medications for this visit.     Facility-Administered Medications Ordered in Other Visits   Medication Dose Route Frequency Provider Last Rate Last Admin    lactated ringers infusion   Intravenous Continuous Omaira Theodore MD   New Bag at 09/09/19 1117       Exam     ROS  Review of Systems   Constitutional:  Negative for appetite change, chills, fatigue and fever.   HENT:  Negative for congestion, ear pain, postnasal drip, rhinorrhea, sneezing, sore throat and tinnitus.    Respiratory:  Negative for cough and shortness of breath.    Cardiovascular:  Negative for chest pain and palpitations.   Gastrointestinal:  Negative for abdominal pain, constipation, diarrhea, nausea and vomiting.   Genitourinary:  Negative for difficulty urinating and dysuria.   Musculoskeletal:  Negative for arthralgias.   Neurological:  Negative for headaches.   Psychiatric/Behavioral:  Negative for sleep disturbance.          Physical Exam  Vitals:    01/11/23 0919   BP: 112/70   BP Location: Right arm   Patient Position: Sitting   BP Method: Large (Manual)   Pulse: 81   Temp: 98.3 °F (36.8 °C)   TempSrc: Tympanic   SpO2: 96%   Weight: 108.2 kg (238 lb 8.6 oz)   Height: 5' 7" (1.702 m)    Body mass index is 37.36 kg/m².  Weight: 108.2 kg (238 lb 8.6 oz)   Height: 5' 7" (170.2 cm)   Physical Exam  Constitutional:       General: He is not in acute distress.     Appearance: Normal appearance. He is well-developed. He is obese.   HENT:      Head: Normocephalic and atraumatic.      Right Ear: External ear normal.      Left Ear: External ear normal.      Nose: Nose normal.      Mouth/Throat: "      Pharynx: No oropharyngeal exudate.   Eyes:      Pupils: Pupils are equal, round, and reactive to light.   Cardiovascular:      Rate and Rhythm: Normal rate. Rhythm irregular.      Heart sounds: No murmur heard.    No friction rub. No gallop.   Pulmonary:      Effort: Pulmonary effort is normal. No respiratory distress.      Breath sounds: Normal breath sounds. No wheezing.   Abdominal:      General: Bowel sounds are normal.      Palpations: Abdomen is soft.   Musculoskeletal:      Cervical back: Neck supple.   Lymphadenopathy:      Cervical: No cervical adenopathy.   Skin:     General: Skin is warm and dry.   Neurological:      Mental Status: He is alert and oriented to person, place, and time.   Psychiatric:         Mood and Affect: Mood normal.         Health Maintenance         Date Due Completion Date    Hemoglobin A1c 07/09/2023 1/9/2023    Eye Exam 09/06/2023 9/6/2022    Override on 9/6/2022: Done    Override on 9/3/2021: Done    Override on 10/12/2015: Done    Override on 11/18/2014: Done    Override on 2/20/2013: Done (Dr. Tran)    Aspirin/Antiplatelet Therapy 12/14/2023 12/14/2022    Diabetes Urine Screening 01/09/2024 1/9/2023    Lipid Panel 01/09/2024 1/9/2023    TETANUS VACCINE 09/28/2024 9/28/2014            Assessment & Plan     Assessment & Plan  Problem List Items Addressed This Visit          Cardiac/Vascular    Essential hypertension (Chronic)  -The current medical regimen is effective;  continue present plan and medications.   - Labs to be addressed at 3 month follow-up visit    Relevant Orders    Basic Metabolic Panel    Mixed hyperlipidemia  -The current medical regimen is effective;  continue present plan and medications.   - Labs to be addressed at 3 month follow-up visit    Relevant Orders    Lipid Panel    Paroxysmal atrial fibrillation  -Stable; Monitor       Renal/    Benign prostatic hyperplasia with nocturia (Chronic)  -The current medical regimen is effective;  continue present  plan and medications.        Endocrine    Acquired hypothyroidism - Primary (Chronic)  -Increase Synthroid to 175 mcg daily  - Patient educated on proper administration of Synthroid    Relevant Medications    levothyroxine (SYNTHROID, LEVOTHROID) 175 MCG tablet    Other Relevant Orders    TSH    T4, FREE    Type 2 diabetes mellitus with microalbuminuria, with long-term current use of insulin (Chronic)  -The current medical regimen is effective;  continue present plan and medications.   - Labs to be addressed at 3 month follow-up visit    Relevant Orders    Hemoglobin A1C    Microalbumin/Creatinine Ratio, Urine         Health Maintenance reviewed: Deferred per patient    Follow-up: 3 months for Hypothyroidism    40+ minutes of total time spent on the encounter, which includes face to face time and non-face to face time preparing to see the patient (eg, review of tests), Obtaining and/or reviewing separately obtained history, documenting clinical information in the electronic or other health record, independently interpreting results (not separately reported) and communicating results to the patient/family/caregiver, or Care coordination (not separately reported).

## 2023-02-09 ENCOUNTER — OFFICE VISIT (OUTPATIENT)
Dept: CARDIOLOGY | Facility: CLINIC | Age: 78
End: 2023-02-09
Payer: MEDICARE

## 2023-02-09 ENCOUNTER — TELEPHONE (OUTPATIENT)
Dept: CARDIOLOGY | Facility: CLINIC | Age: 78
End: 2023-02-09
Payer: MEDICARE

## 2023-02-09 VITALS
OXYGEN SATURATION: 96 % | DIASTOLIC BLOOD PRESSURE: 67 MMHG | BODY MASS INDEX: 37.44 KG/M2 | HEART RATE: 84 BPM | HEIGHT: 67 IN | SYSTOLIC BLOOD PRESSURE: 136 MMHG | WEIGHT: 238.56 LBS

## 2023-02-09 DIAGNOSIS — I65.23 BILATERAL CAROTID ARTERY STENOSIS: Chronic | ICD-10-CM

## 2023-02-09 DIAGNOSIS — I70.0 AORTIC ATHEROSCLEROSIS: ICD-10-CM

## 2023-02-09 DIAGNOSIS — Z95.1 S/P CABG X 2: ICD-10-CM

## 2023-02-09 DIAGNOSIS — I10 ESSENTIAL HYPERTENSION: Chronic | ICD-10-CM

## 2023-02-09 DIAGNOSIS — I49.3 PVC (PREMATURE VENTRICULAR CONTRACTION): Primary | ICD-10-CM

## 2023-02-09 DIAGNOSIS — I35.0 NONRHEUMATIC AORTIC VALVE STENOSIS: Chronic | ICD-10-CM

## 2023-02-09 DIAGNOSIS — I48.0 PAROXYSMAL ATRIAL FIBRILLATION: ICD-10-CM

## 2023-02-09 DIAGNOSIS — Z78.9 STATIN INTOLERANCE: Chronic | ICD-10-CM

## 2023-02-09 DIAGNOSIS — G47.33 OSA ON CPAP: Chronic | ICD-10-CM

## 2023-02-09 DIAGNOSIS — I73.9 PVD (PERIPHERAL VASCULAR DISEASE): ICD-10-CM

## 2023-02-09 PROCEDURE — 1101F PT FALLS ASSESS-DOCD LE1/YR: CPT | Mod: CPTII,S$GLB,, | Performed by: INTERNAL MEDICINE

## 2023-02-09 PROCEDURE — 1126F PR PAIN SEVERITY QUANTIFIED, NO PAIN PRESENT: ICD-10-PCS | Mod: CPTII,S$GLB,, | Performed by: INTERNAL MEDICINE

## 2023-02-09 PROCEDURE — 3072F PR LOW RISK FOR RETINOPATHY: ICD-10-PCS | Mod: CPTII,S$GLB,, | Performed by: INTERNAL MEDICINE

## 2023-02-09 PROCEDURE — 1160F PR REVIEW ALL MEDS BY PRESCRIBER/CLIN PHARMACIST DOCUMENTED: ICD-10-PCS | Mod: CPTII,S$GLB,, | Performed by: INTERNAL MEDICINE

## 2023-02-09 PROCEDURE — 1159F MED LIST DOCD IN RCRD: CPT | Mod: CPTII,S$GLB,, | Performed by: INTERNAL MEDICINE

## 2023-02-09 PROCEDURE — 1126F AMNT PAIN NOTED NONE PRSNT: CPT | Mod: CPTII,S$GLB,, | Performed by: INTERNAL MEDICINE

## 2023-02-09 PROCEDURE — 1157F ADVNC CARE PLAN IN RCRD: CPT | Mod: CPTII,S$GLB,, | Performed by: INTERNAL MEDICINE

## 2023-02-09 PROCEDURE — 1159F PR MEDICATION LIST DOCUMENTED IN MEDICAL RECORD: ICD-10-PCS | Mod: CPTII,S$GLB,, | Performed by: INTERNAL MEDICINE

## 2023-02-09 PROCEDURE — 3072F LOW RISK FOR RETINOPATHY: CPT | Mod: CPTII,S$GLB,, | Performed by: INTERNAL MEDICINE

## 2023-02-09 PROCEDURE — 1160F RVW MEDS BY RX/DR IN RCRD: CPT | Mod: CPTII,S$GLB,, | Performed by: INTERNAL MEDICINE

## 2023-02-09 PROCEDURE — 1157F PR ADVANCE CARE PLAN OR EQUIV PRESENT IN MEDICAL RECORD: ICD-10-PCS | Mod: CPTII,S$GLB,, | Performed by: INTERNAL MEDICINE

## 2023-02-09 PROCEDURE — 3078F PR MOST RECENT DIASTOLIC BLOOD PRESSURE < 80 MM HG: ICD-10-PCS | Mod: CPTII,S$GLB,, | Performed by: INTERNAL MEDICINE

## 2023-02-09 PROCEDURE — 3288F FALL RISK ASSESSMENT DOCD: CPT | Mod: CPTII,S$GLB,, | Performed by: INTERNAL MEDICINE

## 2023-02-09 PROCEDURE — 3075F PR MOST RECENT SYSTOLIC BLOOD PRESS GE 130-139MM HG: ICD-10-PCS | Mod: CPTII,S$GLB,, | Performed by: INTERNAL MEDICINE

## 2023-02-09 PROCEDURE — 99214 OFFICE O/P EST MOD 30 MIN: CPT | Mod: S$GLB,,, | Performed by: INTERNAL MEDICINE

## 2023-02-09 PROCEDURE — 3075F SYST BP GE 130 - 139MM HG: CPT | Mod: CPTII,S$GLB,, | Performed by: INTERNAL MEDICINE

## 2023-02-09 PROCEDURE — 99999 PR PBB SHADOW E&M-EST. PATIENT-LVL V: ICD-10-PCS | Mod: PBBFAC,,, | Performed by: INTERNAL MEDICINE

## 2023-02-09 PROCEDURE — 1101F PR PT FALLS ASSESS DOC 0-1 FALLS W/OUT INJ PAST YR: ICD-10-PCS | Mod: CPTII,S$GLB,, | Performed by: INTERNAL MEDICINE

## 2023-02-09 PROCEDURE — 3288F PR FALLS RISK ASSESSMENT DOCUMENTED: ICD-10-PCS | Mod: CPTII,S$GLB,, | Performed by: INTERNAL MEDICINE

## 2023-02-09 PROCEDURE — 99214 PR OFFICE/OUTPT VISIT, EST, LEVL IV, 30-39 MIN: ICD-10-PCS | Mod: S$GLB,,, | Performed by: INTERNAL MEDICINE

## 2023-02-09 PROCEDURE — 99999 PR PBB SHADOW E&M-EST. PATIENT-LVL V: CPT | Mod: PBBFAC,,, | Performed by: INTERNAL MEDICINE

## 2023-02-09 PROCEDURE — 3078F DIAST BP <80 MM HG: CPT | Mod: CPTII,S$GLB,, | Performed by: INTERNAL MEDICINE

## 2023-02-09 NOTE — PROGRESS NOTES
Subjective:   Patient ID:  Erendira Pretty is a 77 y.o. male who presents for follow-up of Follow-up (6 mth )  Pt tolerating repatha.Patient denies CP, angina or anginal equivalent.   Lipids at goal.    Hypertension  This is a chronic problem. The current episode started more than 1 year ago. The problem has been gradually improving since onset. The problem is controlled. Pertinent negatives include no chest pain, palpitations or shortness of breath. Past treatments include ACE inhibitors, beta blockers and calcium channel blockers. The current treatment provides moderate improvement. There are no compliance problems.  Hypertensive end-organ damage includes CAD/MI.   Coronary Artery Disease  Presents for follow-up visit. Pertinent negatives include no chest pain, chest pressure, chest tightness, dizziness, leg swelling, muscle weakness, palpitations, shortness of breath or weight gain. The symptoms have been stable. Compliance with diet is variable. Compliance with exercise is variable. Compliance with medications is good.     Review of Systems   Constitutional: Negative. Negative for weight gain.   HENT: Negative.     Eyes: Negative.    Cardiovascular: Negative.  Negative for chest pain, leg swelling and palpitations.   Respiratory: Negative.  Negative for chest tightness and shortness of breath.    Endocrine: Negative.    Hematologic/Lymphatic: Negative.    Skin: Negative.    Musculoskeletal:  Negative for muscle weakness.   Gastrointestinal: Negative.    Genitourinary: Negative.    Neurological: Negative.  Negative for dizziness.   Psychiatric/Behavioral: Negative.     Allergic/Immunologic: Negative.    All other systems reviewed and are negative.  Family History   Problem Relation Age of Onset    Heart disease Mother     Heart disease Father     Heart disease Brother     Colon cancer Neg Hx      Past Medical History:   Diagnosis Date    Aortic stenosis     Asthma     Benign prostatic hyperplasia with nocturia      Bilateral carotid artery stenosis 2021    US CAROTID BILATERAL 2021 IMPRESSION: Atherosclerosis with suspected stenosis greater than 50% at the right proximal ICA visually. Velocities are discordant and appear improved from prior. Atherosclerosis on the left with no evidence of flow-limiting stenosis greater than 50%.  FINDINGS: Right: Internal Carotid Artery (ICA): Peak systolic velocity 118 cm/sec. End diastolic velocity 35 cm/sec    BPH (benign prostatic hyperplasia)     CAD (coronary artery disease)     40% lesion ;[facility]azo    Cirrhosis     Claudication 2014    Colon polyp     COPD (chronic obstructive pulmonary disease)     ED (erectile dysfunction)     Encounter for blood transfusion     Ex-smoker     Hearing loss NEC     Hepatitis C     Cured;reji brown     Hyperlipidemia     Hypertension     Hypothyroid     s/p tx graves    Open angle with borderline findings and low glaucoma risk in both eyes 2020    DELONTE on CPAP     Osteoarthritis     PVD (peripheral vascular disease)     Secondary esophageal varices without bleeding 2017    Type 2 diabetes mellitus      Social History     Socioeconomic History    Marital status:      Spouse name: YAEL    Number of children: 2   Tobacco Use    Smoking status: Former     Packs/day: 0.50     Years: 4.00     Pack years: 2.00     Types: Cigarettes     Quit date: 1972     Years since quittin.6    Smokeless tobacco: Former     Types: Chew     Quit date: 1976   Substance and Sexual Activity    Alcohol use: Yes     Alcohol/week: 2.0 standard drinks     Types: 2 Cans of beer per week    Drug use: No    Sexual activity: Yes     Partners: Female   Social History Narrative    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     Current Outpatient Medications on File Prior to Visit   Medication Sig Dispense Refill     ACCU-CHEK GRACE PLUS METER Misc AS DIRECTED 1 each 0    albuterol (VENTOLIN HFA) 90 mcg/actuation inhaler Inhale 2 puffs into the lungs every 6 (six) hours as needed for Wheezing or Shortness of Breath. Rescue 18 g 3    amLODIPine (NORVASC) 10 MG tablet TAKE 1 TABLET BY MOUTH ONCE A DAY (DOSE INCREASE) 30 tablet 11    aspirin (ECOTRIN) 81 MG EC tablet TAKE 1 TABLET BY MOUTH EVERY DAY 90 tablet 4    azelastine (ASTELIN) 137 mcg (0.1 %) nasal spray 2 sprays (274 mcg total) by Nasal route 2 (two) times daily. 30 mL 6    blood sugar diagnostic (ACCU-CHEK GRACE PLUS TEST STRP) Strp TEST THREE TIMES A  strip 11    budesonide-formoterol 160-4.5 mcg (SYMBICORT) 160-4.5 mcg/actuation HFAA INHALE 2 PUFFS INTO THE LUNGS TWO TIMES A DAY. 10.2 g 11    carvediloL (COREG) 6.25 MG tablet TAKE 1 TABLET BY MOUTH TWICE DAILY WITH MEALS 60 tablet 5    evolocumab (REPATHA SYRINGE) 140 mg/mL Syrg Inject 140 mg into the skin every 14 (fourteen) days. 2 each 11    famotidine (PEPCID) 40 MG tablet Take 1 tablet (40 mg total) by mouth every evening. 30 tablet 11    finasteride (PROSCAR) 5 mg tablet Take 1 tablet (5 mg total) by mouth once daily. 90 tablet 3    fluticasone propionate (FLONASE) 50 mcg/actuation nasal spray 2 sprays (100 mcg total) by Each Nostril route once daily. 18.2 mL 6    furosemide (LASIX) 40 MG tablet Take 1 tablet (40 mg total) by mouth 2 (two) times daily. 60 tablet 11    GLUCOSAMINE HCL/CHONDR ROSARIO A NA (OSTEO BI-FLEX ORAL) Take 1 tablet by mouth 2 (two) times daily.       JARDIANCE 25 mg tablet TAKE 1 TABLET BY MOUTH ONCE DAILY 90 tablet 0    krill oil 500 mg Cap Take by mouth.      lancets (ACCU-CHEK FASTCLIX LANCET DRUM) Misc TEST TWO TIMES A  each 5    LANTUS SOLOSTAR U-100 INSULIN glargine 100 units/mL SubQ pen INJECT 44 UNITS UNDER THE SKIN EVERY EVENING 45 mL 0    levocetirizine (XYZAL) 5 MG tablet Take 1 tablet (5 mg total) by mouth every evening. 30 tablet 11    levothyroxine (SYNTHROID,  "LEVOTHROID) 175 MCG tablet Take 1 tablet (175 mcg total) by mouth before breakfast. 90 tablet 1    lisinopriL 10 MG tablet TAKE 1 BY MOUTH EVERY DAY 30 tablet 7    metFORMIN (GLUCOPHAGE) 500 MG tablet TAKE 1 TABLET BY MOUTH TWICE DAILY WITH MEALS 180 tablet 3    montelukast (SINGULAIR) 10 mg tablet TAKE 1 TABLET BY MOUTH ONCE DAILY 30 tablet 11    neomycin-polymyxin-dexamethasone (DEXACINE) 3.5 mg/g-10,000 unit/g-0.1 % Oint Place into both eyes 2 (two) times a day. Apply to lid margins 3.5 g 0    pen needle, diabetic (BD ULTRA-FINE MINI PEN NEEDLE) 31 gauge x 3/16" Ndle USE AS DIRECTED 100 each 11    RABEprazole (ACIPHEX) 20 mg tablet Take 1 tablet (20 mg total) by mouth 2 (two) times daily. 60 tablet 11    sildenafiL (VIAGRA) 100 MG tablet Take 1/2 to 1 tablet by mouth one hour prior to intercourse. Max 100mg per day 30 tablet 1     Current Facility-Administered Medications on File Prior to Visit   Medication Dose Route Frequency Provider Last Rate Last Admin    lactated ringers infusion   Intravenous Continuous Omaira Theodore MD   New Bag at 09/09/19 1117     Review of patient's allergies indicates:   Allergen Reactions    Lipitor [atorvastatin] Other (See Comments)     Muscle aches and pains as well as fatigue.     Niacin preparations Other (See Comments)     Causes broken blood vessels and bruises at 4 times normal dose.    Statins-hmg-coa reductase inhibitors Other (See Comments)     Statins cause intolerable myalgias.    Iodinated contrast media Hives     Tachycardia    Omeprazole Hives and Itching    Prilosec [omeprazole magnesium] Hives and Itching       Objective:     Physical Exam  Vitals and nursing note reviewed.   Constitutional:       Appearance: He is well-developed.   HENT:      Head: Normocephalic and atraumatic.   Eyes:      Conjunctiva/sclera: Conjunctivae normal.      Pupils: Pupils are equal, round, and reactive to light.   Cardiovascular:      Rate and Rhythm: Normal rate and regular " rhythm.      Pulses: Intact distal pulses.      Heart sounds: Normal heart sounds.   Pulmonary:      Effort: Pulmonary effort is normal.      Breath sounds: Normal breath sounds.   Abdominal:      General: Bowel sounds are normal.      Palpations: Abdomen is soft.   Musculoskeletal:      Cervical back: Normal range of motion and neck supple.   Skin:     General: Skin is warm and dry.   Neurological:      Mental Status: He is alert and oriented to person, place, and time.       Assessment:     1. PVC (premature ventricular contraction)    2. PVD (peripheral vascular disease)    3. S/P CABG x 2    4. Aortic atherosclerosis    5. Paroxysmal atrial fibrillation    6. Essential hypertension    7. Nonrheumatic aortic valve stenosis    8. Bilateral carotid artery stenosis    9. DELONTE on CPAP    10. Statin intolerance        Plan:     PVC (premature ventricular contraction)    PVD (peripheral vascular disease)    S/P CABG x 2    Aortic atherosclerosis    Paroxysmal atrial fibrillation    Essential hypertension    Nonrheumatic aortic valve stenosis    Bilateral carotid artery stenosis    DELONTE on CPAP    Statin intolerance    continue coreg 1/2 tab bid- HTN  Continue lisinopril , norvasc - HTN  continue repatha - HLP

## 2023-02-10 ENCOUNTER — PATIENT MESSAGE (OUTPATIENT)
Dept: CARDIOLOGY | Facility: CLINIC | Age: 78
End: 2023-02-10
Payer: MEDICARE

## 2023-02-10 DIAGNOSIS — I70.0 AORTIC ATHEROSCLEROSIS: Primary | ICD-10-CM

## 2023-02-21 ENCOUNTER — OFFICE VISIT (OUTPATIENT)
Dept: INTERNAL MEDICINE | Facility: CLINIC | Age: 78
End: 2023-02-21
Payer: MEDICARE

## 2023-02-21 VITALS
OXYGEN SATURATION: 99 % | BODY MASS INDEX: 37.15 KG/M2 | SYSTOLIC BLOOD PRESSURE: 134 MMHG | DIASTOLIC BLOOD PRESSURE: 80 MMHG | WEIGHT: 237.19 LBS | TEMPERATURE: 98 F | HEART RATE: 80 BPM

## 2023-02-21 DIAGNOSIS — J02.9 PHARYNGITIS, UNSPECIFIED ETIOLOGY: Primary | ICD-10-CM

## 2023-02-21 PROCEDURE — 1157F PR ADVANCE CARE PLAN OR EQUIV PRESENT IN MEDICAL RECORD: ICD-10-PCS | Mod: CPTII,S$GLB,, | Performed by: PEDIATRICS

## 2023-02-21 PROCEDURE — 99999 PR PBB SHADOW E&M-EST. PATIENT-LVL IV: ICD-10-PCS | Mod: PBBFAC,,, | Performed by: PEDIATRICS

## 2023-02-21 PROCEDURE — 3072F PR LOW RISK FOR RETINOPATHY: ICD-10-PCS | Mod: CPTII,S$GLB,, | Performed by: PEDIATRICS

## 2023-02-21 PROCEDURE — 1125F AMNT PAIN NOTED PAIN PRSNT: CPT | Mod: CPTII,S$GLB,, | Performed by: PEDIATRICS

## 2023-02-21 PROCEDURE — 3079F PR MOST RECENT DIASTOLIC BLOOD PRESSURE 80-89 MM HG: ICD-10-PCS | Mod: CPTII,S$GLB,, | Performed by: PEDIATRICS

## 2023-02-21 PROCEDURE — 1160F PR REVIEW ALL MEDS BY PRESCRIBER/CLIN PHARMACIST DOCUMENTED: ICD-10-PCS | Mod: CPTII,S$GLB,, | Performed by: PEDIATRICS

## 2023-02-21 PROCEDURE — 3075F PR MOST RECENT SYSTOLIC BLOOD PRESS GE 130-139MM HG: ICD-10-PCS | Mod: CPTII,S$GLB,, | Performed by: PEDIATRICS

## 2023-02-21 PROCEDURE — 1101F PR PT FALLS ASSESS DOC 0-1 FALLS W/OUT INJ PAST YR: ICD-10-PCS | Mod: CPTII,S$GLB,, | Performed by: PEDIATRICS

## 2023-02-21 PROCEDURE — 99213 PR OFFICE/OUTPT VISIT, EST, LEVL III, 20-29 MIN: ICD-10-PCS | Mod: S$GLB,,, | Performed by: PEDIATRICS

## 2023-02-21 PROCEDURE — 1125F PR PAIN SEVERITY QUANTIFIED, PAIN PRESENT: ICD-10-PCS | Mod: CPTII,S$GLB,, | Performed by: PEDIATRICS

## 2023-02-21 PROCEDURE — 1101F PT FALLS ASSESS-DOCD LE1/YR: CPT | Mod: CPTII,S$GLB,, | Performed by: PEDIATRICS

## 2023-02-21 PROCEDURE — 3288F FALL RISK ASSESSMENT DOCD: CPT | Mod: CPTII,S$GLB,, | Performed by: PEDIATRICS

## 2023-02-21 PROCEDURE — 3079F DIAST BP 80-89 MM HG: CPT | Mod: CPTII,S$GLB,, | Performed by: PEDIATRICS

## 2023-02-21 PROCEDURE — 1159F PR MEDICATION LIST DOCUMENTED IN MEDICAL RECORD: ICD-10-PCS | Mod: CPTII,S$GLB,, | Performed by: PEDIATRICS

## 2023-02-21 PROCEDURE — 1157F ADVNC CARE PLAN IN RCRD: CPT | Mod: CPTII,S$GLB,, | Performed by: PEDIATRICS

## 2023-02-21 PROCEDURE — 3288F PR FALLS RISK ASSESSMENT DOCUMENTED: ICD-10-PCS | Mod: CPTII,S$GLB,, | Performed by: PEDIATRICS

## 2023-02-21 PROCEDURE — 3075F SYST BP GE 130 - 139MM HG: CPT | Mod: CPTII,S$GLB,, | Performed by: PEDIATRICS

## 2023-02-21 PROCEDURE — 99999 PR PBB SHADOW E&M-EST. PATIENT-LVL IV: CPT | Mod: PBBFAC,,, | Performed by: PEDIATRICS

## 2023-02-21 PROCEDURE — 1159F MED LIST DOCD IN RCRD: CPT | Mod: CPTII,S$GLB,, | Performed by: PEDIATRICS

## 2023-02-21 PROCEDURE — 1160F RVW MEDS BY RX/DR IN RCRD: CPT | Mod: CPTII,S$GLB,, | Performed by: PEDIATRICS

## 2023-02-21 PROCEDURE — 3072F LOW RISK FOR RETINOPATHY: CPT | Mod: CPTII,S$GLB,, | Performed by: PEDIATRICS

## 2023-02-21 PROCEDURE — 99213 OFFICE O/P EST LOW 20 MIN: CPT | Mod: S$GLB,,, | Performed by: PEDIATRICS

## 2023-02-21 RX ORDER — AMOXICILLIN 875 MG/1
875 TABLET, FILM COATED ORAL 2 TIMES DAILY
Qty: 20 TABLET | Refills: 0 | Status: SHIPPED | OUTPATIENT
Start: 2023-02-21 | End: 2023-03-03

## 2023-02-21 NOTE — PROGRESS NOTES
Subjective:       Patient ID: Erendira Pretty is a 77 y.o. male.    Chief Complaint: No chief complaint on file.    Erendira Pretty is a 77 y.o. male who presents to clinic for URI Sx for 2 weeks. Sx include sore throat, cough, PND, runny nose, congestion. Gets better after coughing mucus up in morning. Denies smell/taste loss, SOB, fever, CP, n/v/d, weakness, numbness, bowel/bladder Sx. Pain on R>L. Mild pain with swallowing but no difficulties. COVID and flu vaccinated.     Review of Systems   Constitutional:  Negative for activity change, appetite change, chills, diaphoresis, fatigue, fever and unexpected weight change.   HENT:  Positive for nasal congestion, postnasal drip, rhinorrhea, sinus pressure/congestion, sneezing and sore throat. Negative for ear pain, mouth sores and nosebleeds.    Eyes:  Negative for photophobia, pain, discharge, redness and visual disturbance.   Respiratory:  Positive for cough. Negative for chest tightness, shortness of breath, wheezing and stridor.    Cardiovascular:  Negative for chest pain, palpitations and leg swelling.   Gastrointestinal:  Negative for abdominal distention, blood in stool, constipation, diarrhea, nausea and vomiting.   Genitourinary:  Negative for decreased urine volume, difficulty urinating, dysuria, flank pain, frequency, genital sores, hematuria and urgency.   Musculoskeletal:  Negative for arthralgias, back pain, joint swelling, neck pain and neck stiffness.   Integumentary:  Negative for color change, pallor, rash and wound.   Neurological:  Negative for dizziness, syncope, speech difficulty, weakness, light-headedness and headaches.   Hematological:  Negative for adenopathy. Does not bruise/bleed easily.   Psychiatric/Behavioral:  Negative for confusion, decreased concentration, dysphoric mood, hallucinations, sleep disturbance and suicidal ideas. The patient is not nervous/anxious.    All other systems reviewed and are negative.      Objective:       Physical Exam  Vitals and nursing note reviewed.   Constitutional:       General: He is not in acute distress.     Appearance: He is well-developed.   HENT:      Right Ear: Tympanic membrane, ear canal and external ear normal. There is no impacted cerumen.      Left Ear: Tympanic membrane, ear canal and external ear normal. There is no impacted cerumen.      Nose: Congestion and rhinorrhea present.      Mouth/Throat:      Pharynx: Posterior oropharyngeal erythema (w/ PND) present. No oropharyngeal exudate.      Comments: No tonsillar enlargement. No other masses.   Eyes:      Extraocular Movements: Extraocular movements intact.      Conjunctiva/sclera: Conjunctivae normal.      Pupils: Pupils are equal, round, and reactive to light.   Neck:      Thyroid: No thyromegaly.      Vascular: No JVD.   Cardiovascular:      Rate and Rhythm: Normal rate and regular rhythm.      Heart sounds: Normal heart sounds. No murmur heard.  Pulmonary:      Effort: Pulmonary effort is normal. No respiratory distress.      Breath sounds: Normal breath sounds. No wheezing or rales.   Abdominal:      General: There is no distension.      Palpations: Abdomen is soft. There is no mass.      Tenderness: There is no abdominal tenderness. There is no guarding.   Musculoskeletal:      Right lower leg: No edema.      Left lower leg: No edema.   Lymphadenopathy:      Cervical: No cervical adenopathy.   Skin:     Capillary Refill: Capillary refill takes less than 2 seconds.      Findings: No rash.   Neurological:      General: No focal deficit present.      Mental Status: He is alert and oriented to person, place, and time.      Cranial Nerves: No cranial nerve deficit.      Coordination: Coordination normal.   Psychiatric:         Mood and Affect: Mood normal.         Behavior: Behavior normal.         Thought Content: Thought content normal.         Judgment: Judgment normal.       Assessment:       Problem List Items Addressed This Visit     None  Visit Diagnoses       Pharyngitis, unspecified etiology    -  Primary    Relevant Medications    amoxicillin (AMOXIL) 875 MG tablet            Plan:     Pharyngitis, unspecified etiology  -     amoxicillin (AMOXIL) 875 MG tablet; Take 1 tablet (875 mg total) by mouth 2 (two) times daily. for 10 days  Dispense: 20 tablet; Refill: 0         Symptomatic Tx. Add amoxil to OTC. If worsens consult ENT.     Scribe Attestation:   I, Neo Coleman, am scribing for, and in the presence of, Dr. Bhupinder Callaway Jr. I performed the above scribed service and the documentation accurately describes the services I performed. I attest to the accuracy of the note.    I, Dr. Bhupinder Callaway Jr, reviewed documentation as scribed above. I personally performed the services described in this documentation.  I agree that the record reflects my personal performance and is accurate and complete. Bhupinder Callaway Jr., MD.  02/21/2023

## 2023-03-01 ENCOUNTER — OFFICE VISIT (OUTPATIENT)
Dept: HEPATOLOGY | Facility: CLINIC | Age: 78
End: 2023-03-01
Payer: MEDICARE

## 2023-03-01 ENCOUNTER — HOSPITAL ENCOUNTER (OUTPATIENT)
Dept: RADIOLOGY | Facility: HOSPITAL | Age: 78
Discharge: HOME | End: 2023-03-01
Attending: INTERNAL MEDICINE
Payer: MEDICARE

## 2023-03-01 VITALS
DIASTOLIC BLOOD PRESSURE: 66 MMHG | HEART RATE: 72 BPM | SYSTOLIC BLOOD PRESSURE: 122 MMHG | WEIGHT: 233.69 LBS | HEIGHT: 67 IN | BODY MASS INDEX: 36.68 KG/M2

## 2023-03-01 DIAGNOSIS — K74.69 OTHER CIRRHOSIS OF LIVER: Primary | ICD-10-CM

## 2023-03-01 DIAGNOSIS — C22.0 HCC (HEPATOCELLULAR CARCINOMA): ICD-10-CM

## 2023-03-01 PROCEDURE — 1126F PR PAIN SEVERITY QUANTIFIED, NO PAIN PRESENT: ICD-10-PCS | Mod: CPTII,S$GLB,, | Performed by: INTERNAL MEDICINE

## 2023-03-01 PROCEDURE — 1101F PT FALLS ASSESS-DOCD LE1/YR: CPT | Mod: CPTII,S$GLB,, | Performed by: INTERNAL MEDICINE

## 2023-03-01 PROCEDURE — 1159F PR MEDICATION LIST DOCUMENTED IN MEDICAL RECORD: ICD-10-PCS | Mod: CPTII,S$GLB,, | Performed by: INTERNAL MEDICINE

## 2023-03-01 PROCEDURE — 1159F MED LIST DOCD IN RCRD: CPT | Mod: CPTII,S$GLB,, | Performed by: INTERNAL MEDICINE

## 2023-03-01 PROCEDURE — 25500020 PHARM REV CODE 255: Performed by: INTERNAL MEDICINE

## 2023-03-01 PROCEDURE — 99213 OFFICE O/P EST LOW 20 MIN: CPT | Mod: S$GLB,,, | Performed by: INTERNAL MEDICINE

## 2023-03-01 PROCEDURE — 3288F FALL RISK ASSESSMENT DOCD: CPT | Mod: CPTII,S$GLB,, | Performed by: INTERNAL MEDICINE

## 2023-03-01 PROCEDURE — 74183 MRI ABDOMEN W WO CONTRAST: ICD-10-PCS | Mod: 26,,, | Performed by: RADIOLOGY

## 2023-03-01 PROCEDURE — 3072F PR LOW RISK FOR RETINOPATHY: ICD-10-PCS | Mod: CPTII,S$GLB,, | Performed by: INTERNAL MEDICINE

## 2023-03-01 PROCEDURE — 74183 MRI ABD W/O CNTR FLWD CNTR: CPT | Mod: TC

## 2023-03-01 PROCEDURE — 1126F AMNT PAIN NOTED NONE PRSNT: CPT | Mod: CPTII,S$GLB,, | Performed by: INTERNAL MEDICINE

## 2023-03-01 PROCEDURE — A9585 GADOBUTROL INJECTION: HCPCS | Performed by: INTERNAL MEDICINE

## 2023-03-01 PROCEDURE — 99999 PR PBB SHADOW E&M-EST. PATIENT-LVL III: CPT | Mod: PBBFAC,,, | Performed by: INTERNAL MEDICINE

## 2023-03-01 PROCEDURE — 1157F PR ADVANCE CARE PLAN OR EQUIV PRESENT IN MEDICAL RECORD: ICD-10-PCS | Mod: CPTII,S$GLB,, | Performed by: INTERNAL MEDICINE

## 2023-03-01 PROCEDURE — 3078F PR MOST RECENT DIASTOLIC BLOOD PRESSURE < 80 MM HG: ICD-10-PCS | Mod: CPTII,S$GLB,, | Performed by: INTERNAL MEDICINE

## 2023-03-01 PROCEDURE — 3074F PR MOST RECENT SYSTOLIC BLOOD PRESSURE < 130 MM HG: ICD-10-PCS | Mod: CPTII,S$GLB,, | Performed by: INTERNAL MEDICINE

## 2023-03-01 PROCEDURE — 3074F SYST BP LT 130 MM HG: CPT | Mod: CPTII,S$GLB,, | Performed by: INTERNAL MEDICINE

## 2023-03-01 PROCEDURE — 1101F PR PT FALLS ASSESS DOC 0-1 FALLS W/OUT INJ PAST YR: ICD-10-PCS | Mod: CPTII,S$GLB,, | Performed by: INTERNAL MEDICINE

## 2023-03-01 PROCEDURE — 74183 MRI ABD W/O CNTR FLWD CNTR: CPT | Mod: 26,,, | Performed by: RADIOLOGY

## 2023-03-01 PROCEDURE — 1157F ADVNC CARE PLAN IN RCRD: CPT | Mod: CPTII,S$GLB,, | Performed by: INTERNAL MEDICINE

## 2023-03-01 PROCEDURE — 1160F RVW MEDS BY RX/DR IN RCRD: CPT | Mod: CPTII,S$GLB,, | Performed by: INTERNAL MEDICINE

## 2023-03-01 PROCEDURE — 3288F PR FALLS RISK ASSESSMENT DOCUMENTED: ICD-10-PCS | Mod: CPTII,S$GLB,, | Performed by: INTERNAL MEDICINE

## 2023-03-01 PROCEDURE — 99999 PR PBB SHADOW E&M-EST. PATIENT-LVL III: ICD-10-PCS | Mod: PBBFAC,,, | Performed by: INTERNAL MEDICINE

## 2023-03-01 PROCEDURE — 3078F DIAST BP <80 MM HG: CPT | Mod: CPTII,S$GLB,, | Performed by: INTERNAL MEDICINE

## 2023-03-01 PROCEDURE — 99213 PR OFFICE/OUTPT VISIT, EST, LEVL III, 20-29 MIN: ICD-10-PCS | Mod: S$GLB,,, | Performed by: INTERNAL MEDICINE

## 2023-03-01 PROCEDURE — 3072F LOW RISK FOR RETINOPATHY: CPT | Mod: CPTII,S$GLB,, | Performed by: INTERNAL MEDICINE

## 2023-03-01 PROCEDURE — 1160F PR REVIEW ALL MEDS BY PRESCRIBER/CLIN PHARMACIST DOCUMENTED: ICD-10-PCS | Mod: CPTII,S$GLB,, | Performed by: INTERNAL MEDICINE

## 2023-03-01 RX ORDER — GADOBUTROL 604.72 MG/ML
10 INJECTION INTRAVENOUS
Status: COMPLETED | OUTPATIENT
Start: 2023-03-01 | End: 2023-03-01

## 2023-03-01 RX ADMIN — GADOBUTROL 10 ML: 604.72 INJECTION INTRAVENOUS at 11:03

## 2023-03-01 NOTE — PROGRESS NOTES
Subjective:     Erendira Pretty is here for follow up of cirrhosis    HPI  Since Erendira Pretty's last visit there have been no significant issues.  Overall feels well.  Chronic issues with his left shoulder.    No evidence of liver decompensation: no ascites, confusion or GI bleeding.    HCC s/p ablation 9/2019, 1/2022    Review of Systems    Objective:     Physical Exam  Vitals reviewed.   Constitutional:       General: He is not in acute distress.     Appearance: He is well-developed.   HENT:      Head: Normocephalic and atraumatic.      Mouth/Throat:      Pharynx: No oropharyngeal exudate.   Eyes:      General: No scleral icterus.        Right eye: No discharge.         Left eye: No discharge.      Conjunctiva/sclera: Conjunctivae normal.      Pupils: Pupils are equal, round, and reactive to light.   Pulmonary:      Effort: Pulmonary effort is normal. No respiratory distress.      Breath sounds: Normal breath sounds. No wheezing.   Abdominal:      General: There is no distension.      Palpations: Abdomen is soft.      Tenderness: There is no abdominal tenderness.   Neurological:      Mental Status: He is alert and oriented to person, place, and time.   Psychiatric:         Behavior: Behavior normal.       MELD-Na score: 7 at 3/1/2023 10:29 AM  MELD score: 7 at 3/1/2023 10:29 AM  Calculated from:  Serum Creatinine: 1.1 mg/dL at 3/1/2023 10:29 AM  Serum Sodium: 142 mmol/L (Using max of 137 mmol/L) at 3/1/2023 10:29 AM  Total Bilirubin: 0.6 mg/dL (Using min of 1 mg/dL) at 3/1/2023 10:29 AM  INR(ratio): 1.0 at 3/1/2023 10:29 AM  Age: 77 years    WBC   Date Value Ref Range Status   03/01/2023 6.18 3.90 - 12.70 K/uL Final     Hemoglobin   Date Value Ref Range Status   03/01/2023 14.7 14.0 - 18.0 g/dL Final     POC Hematocrit   Date Value Ref Range Status   11/05/2018 26 (L) 36 - 54 %PCV Final     Hematocrit   Date Value Ref Range Status   03/01/2023 43.7 40.0 - 54.0 % Final     Platelets   Date Value Ref Range Status    03/01/2023 187 150 - 450 K/uL Final     BUN   Date Value Ref Range Status   03/01/2023 20 8 - 23 mg/dL Final     Creatinine   Date Value Ref Range Status   03/01/2023 1.1 0.5 - 1.4 mg/dL Final     Glucose   Date Value Ref Range Status   03/01/2023 107 70 - 110 mg/dL Final     Calcium   Date Value Ref Range Status   03/01/2023 9.8 8.7 - 10.5 mg/dL Final     Sodium   Date Value Ref Range Status   03/01/2023 142 136 - 145 mmol/L Final     Potassium   Date Value Ref Range Status   03/01/2023 4.5 3.5 - 5.1 mmol/L Final     Chloride   Date Value Ref Range Status   03/01/2023 104 95 - 110 mmol/L Final     Magnesium   Date Value Ref Range Status   11/08/2018 2.1 1.6 - 2.6 mg/dL Final     AST   Date Value Ref Range Status   03/01/2023 26 10 - 40 U/L Final     ALT   Date Value Ref Range Status   03/01/2023 23 10 - 44 U/L Final     Alkaline Phosphatase   Date Value Ref Range Status   03/01/2023 86 55 - 135 U/L Final     Total Bilirubin   Date Value Ref Range Status   03/01/2023 0.6 0.1 - 1.0 mg/dL Final     Comment:     For infants and newborns, interpretation of results should be based  on gestational age, weight and in agreement with clinical  observations.    Premature Infant recommended reference ranges:  Up to 24 hours.............<8.0 mg/dL  Up to 48 hours............<12.0 mg/dL  3-5 days..................<15.0 mg/dL  6-29 days.................<15.0 mg/dL       Albumin   Date Value Ref Range Status   03/01/2023 4.1 3.5 - 5.2 g/dL Final     INR   Date Value Ref Range Status   03/01/2023 1.0 0.8 - 1.2 Final     Comment:     Coumadin Therapy:  2.0 - 3.0 for INR for all indicators except mechanical heart valves  and antiphospholipid syndromes which should use 2.5 - 3.5.           Assessment/Plan:     1. Other cirrhosis of liver    2. HCC (hepatocellular carcinoma)      Erendira Pretty is a 77 y.o. male withHepatocellular Carcinoma    Other cirrhosis of liver-labs look great; patient has low MELD, well compensated  -continue  to monitor meld score  -variceal screening in June of 2024  -     Comprehensive Metabolic Panel; Future; Expected date: 03/01/2023  -     CBC Auto Differential; Future; Expected date: 03/01/2023  -     AFP Tumor Marker; Future; Expected date: 03/01/2023  -     Protime-INR; Future; Expected date: 03/01/2023    HCC (hepatocellular carcinoma)-no evidence of recurrence  -repeat MRI in 6 months  -     MRI Abdomen W WO Contrast; Future; Expected date: 03/01/2023  -     AFP Tumor Marker; Future; Expected date: 03/01/2023  -     Protime-INR; Future; Expected date: 03/01/2023    RTC in 6 months with preclinic labs and imaging    Anna Tomas MD

## 2023-03-03 ENCOUNTER — TELEPHONE (OUTPATIENT)
Dept: HEPATOLOGY | Facility: CLINIC | Age: 78
End: 2023-03-03
Payer: MEDICARE

## 2023-03-03 NOTE — TELEPHONE ENCOUNTER
Patient labs, MRI, and visit has been scheduled.     ----- Message from Anna Tomas MD sent at 3/1/2023  5:58 PM CST -----  Patient left before follow-up could be scheduled.  He needs the labs and MRI that I ordered done in 6 months with a visit.  He said please just call him or text him after it scheduled.  He does not use the portal very much at home and is Internet is not consistent so please use the telephone.

## 2023-03-03 NOTE — TELEPHONE ENCOUNTER
Spoke with patient and advised that labs were scheduled. Patient voices understanding.    ----- Message from Jil Somers sent at 3/3/2023  4:35 PM CST -----  Regarding: Appointment Verification  Contact: Erendira Krishna is requesting a callback from the nurse who advised he has an appointment on 09/03/2023 for a lab ordered by Dr Tomas.    The appointment is not in [facility].    Erendira can be reached at [phone] ([email or web address])        Thanks

## 2023-03-10 ENCOUNTER — OFFICE VISIT (OUTPATIENT)
Dept: OTOLARYNGOLOGY | Facility: CLINIC | Age: 78
End: 2023-03-10
Payer: MEDICARE

## 2023-03-10 VITALS
BODY MASS INDEX: 36.54 KG/M2 | HEART RATE: 52 BPM | RESPIRATION RATE: 18 BRPM | SYSTOLIC BLOOD PRESSURE: 105 MMHG | WEIGHT: 232.81 LBS | HEIGHT: 67 IN | TEMPERATURE: 97 F | DIASTOLIC BLOOD PRESSURE: 65 MMHG

## 2023-03-10 DIAGNOSIS — J04.0: Primary | ICD-10-CM

## 2023-03-10 PROCEDURE — 99999 PR PBB SHADOW E&M-EST. PATIENT-LVL V: CPT | Mod: PBBFAC,,, | Performed by: STUDENT IN AN ORGANIZED HEALTH CARE EDUCATION/TRAINING PROGRAM

## 2023-03-10 PROCEDURE — 1159F PR MEDICATION LIST DOCUMENTED IN MEDICAL RECORD: ICD-10-PCS | Mod: CPTII,S$GLB,, | Performed by: STUDENT IN AN ORGANIZED HEALTH CARE EDUCATION/TRAINING PROGRAM

## 2023-03-10 PROCEDURE — 3288F PR FALLS RISK ASSESSMENT DOCUMENTED: ICD-10-PCS | Mod: CPTII,S$GLB,, | Performed by: STUDENT IN AN ORGANIZED HEALTH CARE EDUCATION/TRAINING PROGRAM

## 2023-03-10 PROCEDURE — 3288F FALL RISK ASSESSMENT DOCD: CPT | Mod: CPTII,S$GLB,, | Performed by: STUDENT IN AN ORGANIZED HEALTH CARE EDUCATION/TRAINING PROGRAM

## 2023-03-10 PROCEDURE — 3072F LOW RISK FOR RETINOPATHY: CPT | Mod: CPTII,S$GLB,, | Performed by: STUDENT IN AN ORGANIZED HEALTH CARE EDUCATION/TRAINING PROGRAM

## 2023-03-10 PROCEDURE — 1157F PR ADVANCE CARE PLAN OR EQUIV PRESENT IN MEDICAL RECORD: ICD-10-PCS | Mod: CPTII,S$GLB,, | Performed by: STUDENT IN AN ORGANIZED HEALTH CARE EDUCATION/TRAINING PROGRAM

## 2023-03-10 PROCEDURE — 1101F PR PT FALLS ASSESS DOC 0-1 FALLS W/OUT INJ PAST YR: ICD-10-PCS | Mod: CPTII,S$GLB,, | Performed by: STUDENT IN AN ORGANIZED HEALTH CARE EDUCATION/TRAINING PROGRAM

## 2023-03-10 PROCEDURE — 3078F PR MOST RECENT DIASTOLIC BLOOD PRESSURE < 80 MM HG: ICD-10-PCS | Mod: CPTII,S$GLB,, | Performed by: STUDENT IN AN ORGANIZED HEALTH CARE EDUCATION/TRAINING PROGRAM

## 2023-03-10 PROCEDURE — 1157F ADVNC CARE PLAN IN RCRD: CPT | Mod: CPTII,S$GLB,, | Performed by: STUDENT IN AN ORGANIZED HEALTH CARE EDUCATION/TRAINING PROGRAM

## 2023-03-10 PROCEDURE — 3072F PR LOW RISK FOR RETINOPATHY: ICD-10-PCS | Mod: CPTII,S$GLB,, | Performed by: STUDENT IN AN ORGANIZED HEALTH CARE EDUCATION/TRAINING PROGRAM

## 2023-03-10 PROCEDURE — 3074F PR MOST RECENT SYSTOLIC BLOOD PRESSURE < 130 MM HG: ICD-10-PCS | Mod: CPTII,S$GLB,, | Performed by: STUDENT IN AN ORGANIZED HEALTH CARE EDUCATION/TRAINING PROGRAM

## 2023-03-10 PROCEDURE — 31575 PR LARYNGOSCOPY, FLEXIBLE; DIAGNOSTIC: ICD-10-PCS | Mod: S$GLB,,, | Performed by: STUDENT IN AN ORGANIZED HEALTH CARE EDUCATION/TRAINING PROGRAM

## 2023-03-10 PROCEDURE — 99999 PR PBB SHADOW E&M-EST. PATIENT-LVL V: ICD-10-PCS | Mod: PBBFAC,,, | Performed by: STUDENT IN AN ORGANIZED HEALTH CARE EDUCATION/TRAINING PROGRAM

## 2023-03-10 PROCEDURE — 1125F AMNT PAIN NOTED PAIN PRSNT: CPT | Mod: CPTII,S$GLB,, | Performed by: STUDENT IN AN ORGANIZED HEALTH CARE EDUCATION/TRAINING PROGRAM

## 2023-03-10 PROCEDURE — 1159F MED LIST DOCD IN RCRD: CPT | Mod: CPTII,S$GLB,, | Performed by: STUDENT IN AN ORGANIZED HEALTH CARE EDUCATION/TRAINING PROGRAM

## 2023-03-10 PROCEDURE — 99213 OFFICE O/P EST LOW 20 MIN: CPT | Mod: 25,S$GLB,, | Performed by: STUDENT IN AN ORGANIZED HEALTH CARE EDUCATION/TRAINING PROGRAM

## 2023-03-10 PROCEDURE — 1125F PR PAIN SEVERITY QUANTIFIED, PAIN PRESENT: ICD-10-PCS | Mod: CPTII,S$GLB,, | Performed by: STUDENT IN AN ORGANIZED HEALTH CARE EDUCATION/TRAINING PROGRAM

## 2023-03-10 PROCEDURE — 31575 DIAGNOSTIC LARYNGOSCOPY: CPT | Mod: S$GLB,,, | Performed by: STUDENT IN AN ORGANIZED HEALTH CARE EDUCATION/TRAINING PROGRAM

## 2023-03-10 PROCEDURE — 99213 PR OFFICE/OUTPT VISIT, EST, LEVL III, 20-29 MIN: ICD-10-PCS | Mod: 25,S$GLB,, | Performed by: STUDENT IN AN ORGANIZED HEALTH CARE EDUCATION/TRAINING PROGRAM

## 2023-03-10 PROCEDURE — 1101F PT FALLS ASSESS-DOCD LE1/YR: CPT | Mod: CPTII,S$GLB,, | Performed by: STUDENT IN AN ORGANIZED HEALTH CARE EDUCATION/TRAINING PROGRAM

## 2023-03-10 PROCEDURE — 3074F SYST BP LT 130 MM HG: CPT | Mod: CPTII,S$GLB,, | Performed by: STUDENT IN AN ORGANIZED HEALTH CARE EDUCATION/TRAINING PROGRAM

## 2023-03-10 PROCEDURE — 3078F DIAST BP <80 MM HG: CPT | Mod: CPTII,S$GLB,, | Performed by: STUDENT IN AN ORGANIZED HEALTH CARE EDUCATION/TRAINING PROGRAM

## 2023-03-10 RX ORDER — CHLORHEXIDINE GLUCONATE ORAL RINSE 1.2 MG/ML
15 SOLUTION DENTAL 2 TIMES DAILY
Qty: 420 ML | Refills: 0 | Status: SHIPPED | OUTPATIENT
Start: 2023-03-10 | End: 2023-03-24

## 2023-03-10 RX ORDER — SULFAMETHOXAZOLE AND TRIMETHOPRIM 800; 160 MG/1; MG/1
1 TABLET ORAL 2 TIMES DAILY
Qty: 10 TABLET | Refills: 0 | Status: SHIPPED | OUTPATIENT
Start: 2023-03-10 | End: 2023-08-21

## 2023-03-10 NOTE — PROGRESS NOTES
"Referring Provider:    No referring provider defined for this encounter.  Subjective:   Patient: Erendira Pretty [id number], :1945   Visit date:3/10/2023 9:32 AM    Chief Complaint: see below  HPI:       Sore Throat (Pt stated that the pain has lasted for over a month and the pain is on one side of his throat.)    Prior notes reviewed  Clinical documentation obtained by nursing staff reviewed.       3/9/22  Liver ablation 22, pt was intubated. C/o sore throat since.   Reported persistent clear mucus/drainage tinged with blood x 2 days.   Hx GERD, currently taking Aciphex 20 mg QD, has taken for many years.   Hx of ESS 2019, full house    3/10/23    Right sided sore throat for 1+ months. Denies inciting incident. Present all the time. Worse with swallowing. Also with some dysphonia and coughing.     Took abx for 10 days with mild improvement.     The patient denies neck mass, odynophagia, dysphagia, otalgia, unusual bleeding, or unintentional weight loss, trismus.     Not a smoker. Quit at 24.    Did not start right after EGD (about 1.5 months later).    S/p EGD 22    EGD results  Impression:            - Small (< 5 mm) esophageal varices.                          - Z-line regular, 40 cm from the incisors.                          - Gastritis. Biopsied.                          - Normal examined duodenum.   Recommendation:        - Discharge patient to home (via wheelchair).                          - Resume previous diet.                          - Continue present medications.                          - Repeat upper endoscopy in 2 years for                          surveillance.                          - Return to referring physician as previously                          scheduled.       Objective:     Physical Exam:  Vitals:  /65   Pulse (!) 52   Temp 96.8 °F (36 °C) (Tympanic)   Resp 18   Ht 5' 7" (1.702 m)   Wt 105.6 kg (232 lb 12.9 oz)   BMI 36.46 kg/m²   General appearance:  Well " developed, well nourished    Ears:  Otoscopy of external auditory canals and tympanic membranes was normal, clinical speech reception thresholds grossly intact, no mass/lesion of auricle.    Nose:  See below    Mouth:  No mass/lesion of lips, teeth, gums, hard/soft palate, tongue, tonsils, or oropharynx.    Neck & Lymphatics:  No cervical lymphadenopathy, no neck mass/crepitus/ asymmetry, trachea is midline, no thyroid enlargement/tenderness/mass.      Data review  Review of records:      I reviewed records from the referring provider's office visits.  These describe the history, workup, and/or treatment of this problem thus far.          Procedure -Transnasal fiberoptic laryngoscopy     Surgeon: Flaco Bailon M.D. .      Anesthesia: topical 0.05% oxymetazoline with 4% lidocaine      Complications: None.     Description of Procedure: With the patient in the sitting position, topical lidocaine and oxymetazoline was applied to the nose. The scope was passed through the nose. Examination was carried out of the nose, nasopharynx, oropharynx, hypopharynx, and larynx with findings as noted below. Scope was removed. The patient tolerated the procedure well.      Findings: No masses or lesions in the nose, nasopharynx, oropharynx, hypopharynx.   Vocal fold abduction and adduction is normal. No pooling of secretions in the piriform sinuses, penetration, or aspiration.           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

## 2023-03-23 ENCOUNTER — OFFICE VISIT (OUTPATIENT)
Dept: OTOLARYNGOLOGY | Facility: CLINIC | Age: 78
End: 2023-03-23
Payer: MEDICARE

## 2023-03-23 VITALS
SYSTOLIC BLOOD PRESSURE: 121 MMHG | TEMPERATURE: 98 F | HEART RATE: 89 BPM | DIASTOLIC BLOOD PRESSURE: 70 MMHG | WEIGHT: 237 LBS | BODY MASS INDEX: 37.12 KG/M2

## 2023-03-23 DIAGNOSIS — J30.0 VASOMOTOR RHINITIS: Primary | ICD-10-CM

## 2023-03-23 DIAGNOSIS — J04.0: ICD-10-CM

## 2023-03-23 DIAGNOSIS — B37.89 LARYNGEAL CANDIDIASIS: ICD-10-CM

## 2023-03-23 PROCEDURE — 1125F AMNT PAIN NOTED PAIN PRSNT: CPT | Mod: CPTII,S$GLB,, | Performed by: STUDENT IN AN ORGANIZED HEALTH CARE EDUCATION/TRAINING PROGRAM

## 2023-03-23 PROCEDURE — 1157F ADVNC CARE PLAN IN RCRD: CPT | Mod: CPTII,S$GLB,, | Performed by: STUDENT IN AN ORGANIZED HEALTH CARE EDUCATION/TRAINING PROGRAM

## 2023-03-23 PROCEDURE — 1157F PR ADVANCE CARE PLAN OR EQUIV PRESENT IN MEDICAL RECORD: ICD-10-PCS | Mod: CPTII,S$GLB,, | Performed by: STUDENT IN AN ORGANIZED HEALTH CARE EDUCATION/TRAINING PROGRAM

## 2023-03-23 PROCEDURE — 31575 DIAGNOSTIC LARYNGOSCOPY: CPT | Mod: S$GLB,,, | Performed by: STUDENT IN AN ORGANIZED HEALTH CARE EDUCATION/TRAINING PROGRAM

## 2023-03-23 PROCEDURE — 99999 PR PBB SHADOW E&M-EST. PATIENT-LVL III: CPT | Mod: PBBFAC,,, | Performed by: STUDENT IN AN ORGANIZED HEALTH CARE EDUCATION/TRAINING PROGRAM

## 2023-03-23 PROCEDURE — 3078F DIAST BP <80 MM HG: CPT | Mod: CPTII,S$GLB,, | Performed by: STUDENT IN AN ORGANIZED HEALTH CARE EDUCATION/TRAINING PROGRAM

## 2023-03-23 PROCEDURE — 1125F PR PAIN SEVERITY QUANTIFIED, PAIN PRESENT: ICD-10-PCS | Mod: CPTII,S$GLB,, | Performed by: STUDENT IN AN ORGANIZED HEALTH CARE EDUCATION/TRAINING PROGRAM

## 2023-03-23 PROCEDURE — 3078F PR MOST RECENT DIASTOLIC BLOOD PRESSURE < 80 MM HG: ICD-10-PCS | Mod: CPTII,S$GLB,, | Performed by: STUDENT IN AN ORGANIZED HEALTH CARE EDUCATION/TRAINING PROGRAM

## 2023-03-23 PROCEDURE — 3288F FALL RISK ASSESSMENT DOCD: CPT | Mod: CPTII,S$GLB,, | Performed by: STUDENT IN AN ORGANIZED HEALTH CARE EDUCATION/TRAINING PROGRAM

## 2023-03-23 PROCEDURE — 99999 PR PBB SHADOW E&M-EST. PATIENT-LVL III: ICD-10-PCS | Mod: PBBFAC,,, | Performed by: STUDENT IN AN ORGANIZED HEALTH CARE EDUCATION/TRAINING PROGRAM

## 2023-03-23 PROCEDURE — 3074F PR MOST RECENT SYSTOLIC BLOOD PRESSURE < 130 MM HG: ICD-10-PCS | Mod: CPTII,S$GLB,, | Performed by: STUDENT IN AN ORGANIZED HEALTH CARE EDUCATION/TRAINING PROGRAM

## 2023-03-23 PROCEDURE — 3074F SYST BP LT 130 MM HG: CPT | Mod: CPTII,S$GLB,, | Performed by: STUDENT IN AN ORGANIZED HEALTH CARE EDUCATION/TRAINING PROGRAM

## 2023-03-23 PROCEDURE — 1101F PR PT FALLS ASSESS DOC 0-1 FALLS W/OUT INJ PAST YR: ICD-10-PCS | Mod: CPTII,S$GLB,, | Performed by: STUDENT IN AN ORGANIZED HEALTH CARE EDUCATION/TRAINING PROGRAM

## 2023-03-23 PROCEDURE — 1101F PT FALLS ASSESS-DOCD LE1/YR: CPT | Mod: CPTII,S$GLB,, | Performed by: STUDENT IN AN ORGANIZED HEALTH CARE EDUCATION/TRAINING PROGRAM

## 2023-03-23 PROCEDURE — 3072F PR LOW RISK FOR RETINOPATHY: ICD-10-PCS | Mod: CPTII,S$GLB,, | Performed by: STUDENT IN AN ORGANIZED HEALTH CARE EDUCATION/TRAINING PROGRAM

## 2023-03-23 PROCEDURE — 99214 PR OFFICE/OUTPT VISIT, EST, LEVL IV, 30-39 MIN: ICD-10-PCS | Mod: 25,S$GLB,, | Performed by: STUDENT IN AN ORGANIZED HEALTH CARE EDUCATION/TRAINING PROGRAM

## 2023-03-23 PROCEDURE — 1159F MED LIST DOCD IN RCRD: CPT | Mod: CPTII,S$GLB,, | Performed by: STUDENT IN AN ORGANIZED HEALTH CARE EDUCATION/TRAINING PROGRAM

## 2023-03-23 PROCEDURE — 1159F PR MEDICATION LIST DOCUMENTED IN MEDICAL RECORD: ICD-10-PCS | Mod: CPTII,S$GLB,, | Performed by: STUDENT IN AN ORGANIZED HEALTH CARE EDUCATION/TRAINING PROGRAM

## 2023-03-23 PROCEDURE — 3288F PR FALLS RISK ASSESSMENT DOCUMENTED: ICD-10-PCS | Mod: CPTII,S$GLB,, | Performed by: STUDENT IN AN ORGANIZED HEALTH CARE EDUCATION/TRAINING PROGRAM

## 2023-03-23 PROCEDURE — 31575 PR LARYNGOSCOPY, FLEXIBLE; DIAGNOSTIC: ICD-10-PCS | Mod: S$GLB,,, | Performed by: STUDENT IN AN ORGANIZED HEALTH CARE EDUCATION/TRAINING PROGRAM

## 2023-03-23 PROCEDURE — 3072F LOW RISK FOR RETINOPATHY: CPT | Mod: CPTII,S$GLB,, | Performed by: STUDENT IN AN ORGANIZED HEALTH CARE EDUCATION/TRAINING PROGRAM

## 2023-03-23 PROCEDURE — 99214 OFFICE O/P EST MOD 30 MIN: CPT | Mod: 25,S$GLB,, | Performed by: STUDENT IN AN ORGANIZED HEALTH CARE EDUCATION/TRAINING PROGRAM

## 2023-03-23 RX ORDER — FLUCONAZOLE 150 MG/1
150 TABLET ORAL DAILY
Qty: 14 TABLET | Refills: 0 | Status: SHIPPED | OUTPATIENT
Start: 2023-03-23 | End: 2023-04-06

## 2023-03-23 RX ORDER — IPRATROPIUM BROMIDE 21 UG/1
2 SPRAY, METERED NASAL 2 TIMES DAILY
Qty: 30 ML | Refills: 0 | Status: SHIPPED | OUTPATIENT
Start: 2023-03-23 | End: 2024-02-23

## 2023-03-23 NOTE — PROGRESS NOTES
Referring Provider:    No referring provider defined for this encounter.  Subjective:   Patient: Erendira Pretty [id number], :1945   Visit date:3/23/2023 9:32 AM    Chief Complaint: see below  HPI:       Sore Throat    Prior notes reviewed  Clinical documentation obtained by nursing staff reviewed.       3/9/22  Liver ablation 22, pt was intubated. C/o sore throat since.   Reported persistent clear mucus/drainage tinged with blood x 2 days.   Hx GERD, currently taking Aciphex 20 mg QD, has taken for many years.   Hx of ESS 2019, full house    3/10/23  Right sided sore throat for 1+ months. Denies inciting incident. Present all the time. Worse with swallowing. Also with some dysphonia and coughing.     Took abx for 10 days with mild improvement.     The patient denies neck mass, odynophagia, dysphagia, otalgia, unusual bleeding, or unintentional weight loss, trismus.     Not a smoker. Quit at 24.    Did not start right after EGD (about 1.5 months later). Started 2 weeks later.     3/23/23  Got improvement with peridex and bactrim while on the medications. Essentially resolved. Now recurred some. Mostly feeling a sore throat when he swallows, globus (right).    Voice better.     Does use symbicort - using BID. Does use a spacer    Does have clear rhinorrhea, all the time. Worse sometimes with meals and lying down.          Objective:     Physical Exam:  Vitals:  /70 (BP Location: Left arm, Patient Position: Sitting)   Pulse 89   Temp 98.1 °F (36.7 °C)   Wt 107.5 kg (236 lb 15.9 oz)   BMI 37.12 kg/m²   General appearance:  Well developed, well nourished    Ears:  Otoscopy of external auditory canals and tympanic membranes was normal, clinical speech reception thresholds grossly intact, no mass/lesion of auricle.    Nose:  See below    Mouth:  No mass/lesion of lips, teeth, gums, hard/soft palate, tongue, tonsils, or oropharynx.    Neck & Lymphatics:  No cervical lymphadenopathy, no neck  mass/crepitus/ asymmetry, trachea is midline, no thyroid enlargement/tenderness/mass.      Data review  Review of records:      I reviewed records from the referring provider's office visits.  These describe the history, workup, and/or treatment of this problem thus far.      S/p EGD 12/29/22    EGD results  Impression:            - Small (< 5 mm) esophageal varices.                          - Z-line regular, 40 cm from the incisors.                          - Gastritis. Biopsied.                          - Normal examined duodenum.   Recommendation:        - Discharge patient to home (via wheelchair).                          - Resume previous diet.                          - Continue present medications.                          - Repeat upper endoscopy in 2 years for                          surveillance.                          - Return to referring physician as previously                          scheduled.       Procedure -Transnasal fiberoptic laryngoscopy     Surgeon: Flaco Bailon M.D. .      Anesthesia: topical 0.05% oxymetazoline with 4% lidocaine      Complications: None.     Description of Procedure: With the patient in the sitting position, topical lidocaine and oxymetazoline was applied to the nose. The scope was passed through the nose. Examination was carried out of the nose, nasopharynx, oropharynx, hypopharynx, and larynx with findings as noted below. Scope was removed. The patient tolerated the procedure well.      Findings: No masses or lesions in the nose, nasopharynx, oropharynx, hypopharynx.   Vocal fold abduction and adduction is normal. No pooling of secretions in the piriform sinuses, penetration, or aspiration.           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 for 14 days  Dispense: 14 tablet; Refill: 0  -     ipratropium (ATROVENT) 21 mcg (0.03 %) nasal spray; 2 sprays by Each Nostril route 2 (two) times daily.  Dispense: 30 mL; Refill: 0          Improved but not resolved w/Bactrim x 14d  Hold inhaler for 2-3 weeks if cleared per pulm  Diflucan x14d  Atrovent for VR  Repeat exam 3-4 weeks, consider bx if not resolved

## 2023-04-11 ENCOUNTER — LAB VISIT (OUTPATIENT)
Dept: LAB | Facility: HOSPITAL | Age: 78
End: 2023-04-11
Attending: NURSE PRACTITIONER
Payer: MEDICARE

## 2023-04-11 DIAGNOSIS — E03.9 ACQUIRED HYPOTHYROIDISM: Chronic | ICD-10-CM

## 2023-04-11 DIAGNOSIS — Z79.4 TYPE 2 DIABETES MELLITUS WITH MICROALBUMINURIA, WITH LONG-TERM CURRENT USE OF INSULIN: Chronic | ICD-10-CM

## 2023-04-11 DIAGNOSIS — E78.2 MIXED HYPERLIPIDEMIA: ICD-10-CM

## 2023-04-11 DIAGNOSIS — E11.29 TYPE 2 DIABETES MELLITUS WITH MICROALBUMINURIA, WITH LONG-TERM CURRENT USE OF INSULIN: Chronic | ICD-10-CM

## 2023-04-11 DIAGNOSIS — R80.9 TYPE 2 DIABETES MELLITUS WITH MICROALBUMINURIA, WITH LONG-TERM CURRENT USE OF INSULIN: Chronic | ICD-10-CM

## 2023-04-11 DIAGNOSIS — I10 ESSENTIAL HYPERTENSION: Chronic | ICD-10-CM

## 2023-04-11 LAB
ANION GAP SERPL CALC-SCNC: 13 MMOL/L (ref 8–16)
BUN SERPL-MCNC: 20 MG/DL (ref 8–23)
CALCIUM SERPL-MCNC: 9.3 MG/DL (ref 8.7–10.5)
CHLORIDE SERPL-SCNC: 104 MMOL/L (ref 95–110)
CHOLEST SERPL-MCNC: 202 MG/DL (ref 120–199)
CHOLEST/HDLC SERPL: 4.5 {RATIO} (ref 2–5)
CO2 SERPL-SCNC: 25 MMOL/L (ref 23–29)
CREAT SERPL-MCNC: 1.1 MG/DL (ref 0.5–1.4)
EST. GFR  (NO RACE VARIABLE): >60 ML/MIN/1.73 M^2
ESTIMATED AVG GLUCOSE: 143 MG/DL (ref 68–131)
GLUCOSE SERPL-MCNC: 144 MG/DL (ref 70–110)
HBA1C MFR BLD: 6.6 % (ref 4–5.6)
HDLC SERPL-MCNC: 45 MG/DL (ref 40–75)
HDLC SERPL: 22.3 % (ref 20–50)
LDLC SERPL CALC-MCNC: 123.4 MG/DL (ref 63–159)
NONHDLC SERPL-MCNC: 157 MG/DL
POTASSIUM SERPL-SCNC: 4.2 MMOL/L (ref 3.5–5.1)
SODIUM SERPL-SCNC: 142 MMOL/L (ref 136–145)
T4 FREE SERPL-MCNC: 1.02 NG/DL (ref 0.71–1.51)
TRIGL SERPL-MCNC: 168 MG/DL (ref 30–150)
TSH SERPL DL<=0.005 MIU/L-ACNC: 4.78 UIU/ML (ref 0.4–4)

## 2023-04-11 PROCEDURE — 84439 ASSAY OF FREE THYROXINE: CPT | Performed by: NURSE PRACTITIONER

## 2023-04-11 PROCEDURE — 84443 ASSAY THYROID STIM HORMONE: CPT | Performed by: NURSE PRACTITIONER

## 2023-04-11 PROCEDURE — 36415 COLL VENOUS BLD VENIPUNCTURE: CPT | Mod: PO | Performed by: NURSE PRACTITIONER

## 2023-04-11 PROCEDURE — 83036 HEMOGLOBIN GLYCOSYLATED A1C: CPT | Performed by: NURSE PRACTITIONER

## 2023-04-11 PROCEDURE — 80061 LIPID PANEL: CPT | Performed by: NURSE PRACTITIONER

## 2023-04-11 PROCEDURE — 80048 BASIC METABOLIC PNL TOTAL CA: CPT | Performed by: NURSE PRACTITIONER

## 2023-04-14 ENCOUNTER — OFFICE VISIT (OUTPATIENT)
Dept: INTERNAL MEDICINE | Facility: CLINIC | Age: 78
End: 2023-04-14
Payer: MEDICARE

## 2023-04-14 ENCOUNTER — OFFICE VISIT (OUTPATIENT)
Dept: OTOLARYNGOLOGY | Facility: CLINIC | Age: 78
End: 2023-04-14
Payer: MEDICARE

## 2023-04-14 VITALS
HEIGHT: 67 IN | SYSTOLIC BLOOD PRESSURE: 128 MMHG | DIASTOLIC BLOOD PRESSURE: 76 MMHG | WEIGHT: 240.31 LBS | TEMPERATURE: 98 F | HEART RATE: 77 BPM | BODY MASS INDEX: 37.72 KG/M2

## 2023-04-14 VITALS
TEMPERATURE: 98 F | HEIGHT: 67 IN | SYSTOLIC BLOOD PRESSURE: 126 MMHG | BODY MASS INDEX: 37.37 KG/M2 | DIASTOLIC BLOOD PRESSURE: 78 MMHG | RESPIRATION RATE: 16 BRPM | HEART RATE: 86 BPM | WEIGHT: 238.13 LBS | OXYGEN SATURATION: 99 %

## 2023-04-14 DIAGNOSIS — I10 ESSENTIAL HYPERTENSION: Chronic | ICD-10-CM

## 2023-04-14 DIAGNOSIS — N40.1 BENIGN PROSTATIC HYPERPLASIA WITH NOCTURIA: Chronic | ICD-10-CM

## 2023-04-14 DIAGNOSIS — I65.23 BILATERAL CAROTID ARTERY STENOSIS: Chronic | ICD-10-CM

## 2023-04-14 DIAGNOSIS — T46.6X5A STATIN MYOPATHY: ICD-10-CM

## 2023-04-14 DIAGNOSIS — J45.20 MILD INTERMITTENT ASTHMA WITHOUT COMPLICATION: ICD-10-CM

## 2023-04-14 DIAGNOSIS — E03.9 ACQUIRED HYPOTHYROIDISM: Chronic | ICD-10-CM

## 2023-04-14 DIAGNOSIS — Z12.5 PROSTATE CANCER SCREENING: ICD-10-CM

## 2023-04-14 DIAGNOSIS — J04.0: Primary | ICD-10-CM

## 2023-04-14 DIAGNOSIS — R35.1 BENIGN PROSTATIC HYPERPLASIA WITH NOCTURIA: Chronic | ICD-10-CM

## 2023-04-14 DIAGNOSIS — I25.10 CORONARY ARTERY DISEASE INVOLVING NATIVE CORONARY ARTERY OF NATIVE HEART WITHOUT ANGINA PECTORIS: Chronic | ICD-10-CM

## 2023-04-14 DIAGNOSIS — C22.0 HCC (HEPATOCELLULAR CARCINOMA): ICD-10-CM

## 2023-04-14 DIAGNOSIS — K21.9 GASTROESOPHAGEAL REFLUX DISEASE, UNSPECIFIED WHETHER ESOPHAGITIS PRESENT: ICD-10-CM

## 2023-04-14 DIAGNOSIS — Z79.4 TYPE 2 DIABETES MELLITUS WITH CHRONIC KIDNEY DISEASE, WITH LONG-TERM CURRENT USE OF INSULIN, UNSPECIFIED CKD STAGE: Chronic | ICD-10-CM

## 2023-04-14 DIAGNOSIS — I70.0 AORTIC ATHEROSCLEROSIS: ICD-10-CM

## 2023-04-14 DIAGNOSIS — R80.9 TYPE 2 DIABETES MELLITUS WITH MICROALBUMINURIA, WITH LONG-TERM CURRENT USE OF INSULIN: Primary | Chronic | ICD-10-CM

## 2023-04-14 DIAGNOSIS — Z95.1 S/P CABG X 2: ICD-10-CM

## 2023-04-14 DIAGNOSIS — G47.33 OSA ON CPAP: Chronic | ICD-10-CM

## 2023-04-14 DIAGNOSIS — I73.9 PVD (PERIPHERAL VASCULAR DISEASE): ICD-10-CM

## 2023-04-14 DIAGNOSIS — G72.0 STATIN MYOPATHY: ICD-10-CM

## 2023-04-14 DIAGNOSIS — K74.69 OTHER CIRRHOSIS OF LIVER: ICD-10-CM

## 2023-04-14 DIAGNOSIS — E66.01 CLASS 2 SEVERE OBESITY DUE TO EXCESS CALORIES WITH SERIOUS COMORBIDITY AND BODY MASS INDEX (BMI) OF 37.0 TO 37.9 IN ADULT: ICD-10-CM

## 2023-04-14 DIAGNOSIS — E11.22 TYPE 2 DIABETES MELLITUS WITH CHRONIC KIDNEY DISEASE, WITH LONG-TERM CURRENT USE OF INSULIN, UNSPECIFIED CKD STAGE: Chronic | ICD-10-CM

## 2023-04-14 DIAGNOSIS — E11.29 TYPE 2 DIABETES MELLITUS WITH MICROALBUMINURIA, WITH LONG-TERM CURRENT USE OF INSULIN: Primary | Chronic | ICD-10-CM

## 2023-04-14 DIAGNOSIS — Z77.090 ASBESTOS EXPOSURE: ICD-10-CM

## 2023-04-14 DIAGNOSIS — Z79.4 TYPE 2 DIABETES MELLITUS WITH MICROALBUMINURIA, WITH LONG-TERM CURRENT USE OF INSULIN: Primary | Chronic | ICD-10-CM

## 2023-04-14 DIAGNOSIS — E78.2 MIXED HYPERLIPIDEMIA: ICD-10-CM

## 2023-04-14 PROCEDURE — 3072F PR LOW RISK FOR RETINOPATHY: ICD-10-PCS | Mod: CPTII,S$GLB,, | Performed by: STUDENT IN AN ORGANIZED HEALTH CARE EDUCATION/TRAINING PROGRAM

## 2023-04-14 PROCEDURE — 1160F RVW MEDS BY RX/DR IN RCRD: CPT | Mod: CPTII,S$GLB,, | Performed by: PEDIATRICS

## 2023-04-14 PROCEDURE — 3288F FALL RISK ASSESSMENT DOCD: CPT | Mod: CPTII,S$GLB,, | Performed by: STUDENT IN AN ORGANIZED HEALTH CARE EDUCATION/TRAINING PROGRAM

## 2023-04-14 PROCEDURE — 3074F PR MOST RECENT SYSTOLIC BLOOD PRESSURE < 130 MM HG: ICD-10-PCS | Mod: CPTII,S$GLB,, | Performed by: STUDENT IN AN ORGANIZED HEALTH CARE EDUCATION/TRAINING PROGRAM

## 2023-04-14 PROCEDURE — 1159F PR MEDICATION LIST DOCUMENTED IN MEDICAL RECORD: ICD-10-PCS | Mod: CPTII,S$GLB,, | Performed by: STUDENT IN AN ORGANIZED HEALTH CARE EDUCATION/TRAINING PROGRAM

## 2023-04-14 PROCEDURE — 1126F PR PAIN SEVERITY QUANTIFIED, NO PAIN PRESENT: ICD-10-PCS | Mod: CPTII,S$GLB,, | Performed by: STUDENT IN AN ORGANIZED HEALTH CARE EDUCATION/TRAINING PROGRAM

## 2023-04-14 PROCEDURE — 99214 PR OFFICE/OUTPT VISIT, EST, LEVL IV, 30-39 MIN: ICD-10-PCS | Mod: S$GLB,,, | Performed by: PEDIATRICS

## 2023-04-14 PROCEDURE — 3288F PR FALLS RISK ASSESSMENT DOCUMENTED: ICD-10-PCS | Mod: CPTII,S$GLB,, | Performed by: PEDIATRICS

## 2023-04-14 PROCEDURE — 3072F LOW RISK FOR RETINOPATHY: CPT | Mod: CPTII,S$GLB,, | Performed by: STUDENT IN AN ORGANIZED HEALTH CARE EDUCATION/TRAINING PROGRAM

## 2023-04-14 PROCEDURE — 1101F PT FALLS ASSESS-DOCD LE1/YR: CPT | Mod: CPTII,S$GLB,, | Performed by: PEDIATRICS

## 2023-04-14 PROCEDURE — 1159F PR MEDICATION LIST DOCUMENTED IN MEDICAL RECORD: ICD-10-PCS | Mod: CPTII,S$GLB,, | Performed by: PEDIATRICS

## 2023-04-14 PROCEDURE — 3072F LOW RISK FOR RETINOPATHY: CPT | Mod: CPTII,S$GLB,, | Performed by: PEDIATRICS

## 2023-04-14 PROCEDURE — 1157F PR ADVANCE CARE PLAN OR EQUIV PRESENT IN MEDICAL RECORD: ICD-10-PCS | Mod: CPTII,S$GLB,, | Performed by: PEDIATRICS

## 2023-04-14 PROCEDURE — 99213 OFFICE O/P EST LOW 20 MIN: CPT | Mod: 25,S$GLB,, | Performed by: STUDENT IN AN ORGANIZED HEALTH CARE EDUCATION/TRAINING PROGRAM

## 2023-04-14 PROCEDURE — 1157F ADVNC CARE PLAN IN RCRD: CPT | Mod: CPTII,S$GLB,, | Performed by: STUDENT IN AN ORGANIZED HEALTH CARE EDUCATION/TRAINING PROGRAM

## 2023-04-14 PROCEDURE — 3288F FALL RISK ASSESSMENT DOCD: CPT | Mod: CPTII,S$GLB,, | Performed by: PEDIATRICS

## 2023-04-14 PROCEDURE — 3074F SYST BP LT 130 MM HG: CPT | Mod: CPTII,S$GLB,, | Performed by: PEDIATRICS

## 2023-04-14 PROCEDURE — 31575 DIAGNOSTIC LARYNGOSCOPY: CPT | Mod: S$GLB,,, | Performed by: STUDENT IN AN ORGANIZED HEALTH CARE EDUCATION/TRAINING PROGRAM

## 2023-04-14 PROCEDURE — 99999 PR PBB SHADOW E&M-EST. PATIENT-LVL V: ICD-10-PCS | Mod: PBBFAC,,, | Performed by: STUDENT IN AN ORGANIZED HEALTH CARE EDUCATION/TRAINING PROGRAM

## 2023-04-14 PROCEDURE — 3078F DIAST BP <80 MM HG: CPT | Mod: CPTII,S$GLB,, | Performed by: PEDIATRICS

## 2023-04-14 PROCEDURE — 1157F PR ADVANCE CARE PLAN OR EQUIV PRESENT IN MEDICAL RECORD: ICD-10-PCS | Mod: CPTII,S$GLB,, | Performed by: STUDENT IN AN ORGANIZED HEALTH CARE EDUCATION/TRAINING PROGRAM

## 2023-04-14 PROCEDURE — 99999 PR PBB SHADOW E&M-EST. PATIENT-LVL V: CPT | Mod: PBBFAC,,, | Performed by: PEDIATRICS

## 2023-04-14 PROCEDURE — 1126F AMNT PAIN NOTED NONE PRSNT: CPT | Mod: CPTII,S$GLB,, | Performed by: PEDIATRICS

## 2023-04-14 PROCEDURE — 1159F MED LIST DOCD IN RCRD: CPT | Mod: CPTII,S$GLB,, | Performed by: PEDIATRICS

## 2023-04-14 PROCEDURE — 1126F PR PAIN SEVERITY QUANTIFIED, NO PAIN PRESENT: ICD-10-PCS | Mod: CPTII,S$GLB,, | Performed by: PEDIATRICS

## 2023-04-14 PROCEDURE — 1101F PR PT FALLS ASSESS DOC 0-1 FALLS W/OUT INJ PAST YR: ICD-10-PCS | Mod: CPTII,S$GLB,, | Performed by: PEDIATRICS

## 2023-04-14 PROCEDURE — 1101F PT FALLS ASSESS-DOCD LE1/YR: CPT | Mod: CPTII,S$GLB,, | Performed by: STUDENT IN AN ORGANIZED HEALTH CARE EDUCATION/TRAINING PROGRAM

## 2023-04-14 PROCEDURE — 3078F PR MOST RECENT DIASTOLIC BLOOD PRESSURE < 80 MM HG: ICD-10-PCS | Mod: CPTII,S$GLB,, | Performed by: STUDENT IN AN ORGANIZED HEALTH CARE EDUCATION/TRAINING PROGRAM

## 2023-04-14 PROCEDURE — 1160F PR REVIEW ALL MEDS BY PRESCRIBER/CLIN PHARMACIST DOCUMENTED: ICD-10-PCS | Mod: CPTII,S$GLB,, | Performed by: PEDIATRICS

## 2023-04-14 PROCEDURE — 99214 OFFICE O/P EST MOD 30 MIN: CPT | Mod: S$GLB,,, | Performed by: PEDIATRICS

## 2023-04-14 PROCEDURE — 3074F SYST BP LT 130 MM HG: CPT | Mod: CPTII,S$GLB,, | Performed by: STUDENT IN AN ORGANIZED HEALTH CARE EDUCATION/TRAINING PROGRAM

## 2023-04-14 PROCEDURE — 31575 PR LARYNGOSCOPY, FLEXIBLE; DIAGNOSTIC: ICD-10-PCS | Mod: S$GLB,,, | Performed by: STUDENT IN AN ORGANIZED HEALTH CARE EDUCATION/TRAINING PROGRAM

## 2023-04-14 PROCEDURE — 1126F AMNT PAIN NOTED NONE PRSNT: CPT | Mod: CPTII,S$GLB,, | Performed by: STUDENT IN AN ORGANIZED HEALTH CARE EDUCATION/TRAINING PROGRAM

## 2023-04-14 PROCEDURE — 3074F PR MOST RECENT SYSTOLIC BLOOD PRESSURE < 130 MM HG: ICD-10-PCS | Mod: CPTII,S$GLB,, | Performed by: PEDIATRICS

## 2023-04-14 PROCEDURE — 99999 PR PBB SHADOW E&M-EST. PATIENT-LVL V: ICD-10-PCS | Mod: PBBFAC,,, | Performed by: PEDIATRICS

## 2023-04-14 PROCEDURE — 3288F PR FALLS RISK ASSESSMENT DOCUMENTED: ICD-10-PCS | Mod: CPTII,S$GLB,, | Performed by: STUDENT IN AN ORGANIZED HEALTH CARE EDUCATION/TRAINING PROGRAM

## 2023-04-14 PROCEDURE — 3072F PR LOW RISK FOR RETINOPATHY: ICD-10-PCS | Mod: CPTII,S$GLB,, | Performed by: PEDIATRICS

## 2023-04-14 PROCEDURE — 1157F ADVNC CARE PLAN IN RCRD: CPT | Mod: CPTII,S$GLB,, | Performed by: PEDIATRICS

## 2023-04-14 PROCEDURE — 3078F DIAST BP <80 MM HG: CPT | Mod: CPTII,S$GLB,, | Performed by: STUDENT IN AN ORGANIZED HEALTH CARE EDUCATION/TRAINING PROGRAM

## 2023-04-14 PROCEDURE — 1159F MED LIST DOCD IN RCRD: CPT | Mod: CPTII,S$GLB,, | Performed by: STUDENT IN AN ORGANIZED HEALTH CARE EDUCATION/TRAINING PROGRAM

## 2023-04-14 PROCEDURE — 1101F PR PT FALLS ASSESS DOC 0-1 FALLS W/OUT INJ PAST YR: ICD-10-PCS | Mod: CPTII,S$GLB,, | Performed by: STUDENT IN AN ORGANIZED HEALTH CARE EDUCATION/TRAINING PROGRAM

## 2023-04-14 PROCEDURE — 99213 PR OFFICE/OUTPT VISIT, EST, LEVL III, 20-29 MIN: ICD-10-PCS | Mod: 25,S$GLB,, | Performed by: STUDENT IN AN ORGANIZED HEALTH CARE EDUCATION/TRAINING PROGRAM

## 2023-04-14 PROCEDURE — 3078F PR MOST RECENT DIASTOLIC BLOOD PRESSURE < 80 MM HG: ICD-10-PCS | Mod: CPTII,S$GLB,, | Performed by: PEDIATRICS

## 2023-04-14 PROCEDURE — 99999 PR PBB SHADOW E&M-EST. PATIENT-LVL V: CPT | Mod: PBBFAC,,, | Performed by: STUDENT IN AN ORGANIZED HEALTH CARE EDUCATION/TRAINING PROGRAM

## 2023-04-14 RX ORDER — LEVOTHYROXINE SODIUM 200 UG/1
200 TABLET ORAL EVERY MORNING
Qty: 90 TABLET | Refills: 3 | Status: SHIPPED | OUTPATIENT
Start: 2023-04-14 | End: 2023-06-15

## 2023-04-14 NOTE — PROGRESS NOTES
"Referring Provider:    No referring provider defined for this encounter.  Subjective:   Patient: Erendira Pretty [id number], :1945   Visit date:2023 9:32 AM    Chief Complaint: see below  HPI:       Follow-up (Follow up.)    Prior notes reviewed  Clinical documentation obtained by nursing staff reviewed.       3/9/22  Liver ablation 22, pt was intubated. C/o sore throat since.   Reported persistent clear mucus/drainage tinged with blood x 2 days.   Hx GERD, currently taking Aciphex 20 mg QD, has taken for many years.   Hx of ESS 2019, full house    3/10/23  Right sided sore throat for 1+ months. Denies inciting incident. Present all the time. Worse with swallowing. Also with some dysphonia and coughing.     Took abx for 10 days with mild improvement.     The patient denies neck mass, odynophagia, dysphagia, otalgia, unusual bleeding, or unintentional weight loss, trismus.     Not a smoker. Quit at 24.    Did not start right after EGD (about 1.5 months later). Started 2 weeks later.     3/23/23  Got improvement with peridex and bactrim while on the medications. Essentially resolved. Now recurred some. Mostly feeling a sore throat when he swallows, globus (right).    Voice better.     Does use symbicort - using BID. Does use a spacer    Does have clear rhinorrhea, all the time. Worse sometimes with meals and lying down.    23  Doing much better. Pain resolved. Mild globus at times.     Objective:     Physical Exam:  Vitals:  /76   Pulse 77   Temp 98.4 °F (36.9 °C) (Tympanic)   Ht 5' 7" (1.702 m)   Wt 109 kg (240 lb 4.8 oz)   BMI 37.64 kg/m²   General appearance:  Well developed, well nourished    Ears:  Otoscopy of external auditory canals and tympanic membranes was normal, clinical speech reception thresholds grossly intact, no mass/lesion of auricle.    Nose:  See below    Mouth:  No mass/lesion of lips, teeth, gums, hard/soft palate, tongue, tonsils, or oropharynx.    Neck & " Lymphatics:  No cervical lymphadenopathy, no neck mass/crepitus/ asymmetry, trachea is midline, no thyroid enlargement/tenderness/mass.      Data review  Review of records:      I reviewed records from the referring provider's office visits.  These describe the history, workup, and/or treatment of this problem thus far.      S/p EGD 12/29/22    EGD results  Impression:            - Small (< 5 mm) esophageal varices.                          - Z-line regular, 40 cm from the incisors.                          - Gastritis. Biopsied.                          - Normal examined duodenum.   Recommendation:        - Discharge patient to home (via wheelchair).                          - Resume previous diet.                          - Continue present medications.                          - Repeat upper endoscopy in 2 years for                          surveillance.                          - Return to referring physician as previously                          scheduled.       Procedure -Transnasal fiberoptic laryngoscopy     Surgeon: Flaco Bailon M.D. .      Anesthesia: topical 0.05% oxymetazoline with 4% lidocaine      Complications: None.     Description of Procedure: With the patient in the sitting position, topical lidocaine and oxymetazoline was applied to the nose. The scope was passed through the nose. Examination was carried out of the nose, nasopharynx, oropharynx, hypopharynx, and larynx with findings as noted below. Scope was removed. The patient tolerated the procedure well.      Findings: No masses or lesions in the nose, nasopharynx, oropharynx, hypopharynx.   Vocal fold abduction and adduction is normal. No pooling of secretions in the piriform sinuses, penetration, or aspiration.           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

## 2023-04-14 NOTE — PROGRESS NOTES
Subjective:       Patient ID: Erendira Pretty is a 77 y.o. male.    Chief Complaint: Follow-up    Erendira Pretty is a 77 y.o. male who presents to clinic for a follow up     1) DM: no hyper/hypoglycemic symptoms. Infrequent checks at home, taking Jardiance 25 mg, 44u Lantus, and Metformin BID.   2) HTN: rare BP checks, B/P good, no HTNive symptoms. On beta blocker for esophogeal varicies  3) LIPIDS: following D&E, statin intolerant/myopathy. stopped Zetia and Fenofibrate by Cards due to fatigue and myalgia. On repatha, forgets to take.  4) CAD/Atherosclerosis: no CP, active and sees Dr. Gallardo  5) Hypothyroid: asymptomatic, no swallowing difficulties, no hyper/hypothyroid symptoms. Compliant with levothyroxine 175mcg.  6) Hepatocellular carcinoma/cirrhosis: followed by Dr. Tomas  7) Asthma/DELONTE/asbestos/obesity: followed by Pulmonary, off ICS due to ENT findings  8)GERD: quiet on PPI  9)BPH: symptoms ok on proscar     LABS REVIEWED     Review of Systems   Constitutional:  Negative for fever and unexpected weight change.   HENT:  Negative for congestion and rhinorrhea.    Eyes:  Negative for discharge and redness.   Respiratory:  Negative for cough, shortness of breath and wheezing.    Cardiovascular:  Negative for chest pain, palpitations and leg swelling.   Gastrointestinal:  Negative for abdominal pain, constipation, diarrhea and vomiting.   Endocrine: Negative for cold intolerance, heat intolerance, polydipsia, polyphagia and polyuria.   Genitourinary:  Negative for decreased urine volume and difficulty urinating.   Musculoskeletal:  Negative for arthralgias and joint swelling.   Skin:  Negative for rash and wound.   Neurological:  Negative for syncope and headaches.   Psychiatric/Behavioral:  Negative for behavioral problems and sleep disturbance.      Objective:      Physical Exam  Vitals and nursing note reviewed.   Constitutional:       General: He is not in acute distress.     Appearance: He is  well-developed.   Neck:      Thyroid: No thyromegaly.      Vascular: No JVD.   Cardiovascular:      Rate and Rhythm: Normal rate and regular rhythm.      Heart sounds: Normal heart sounds. No murmur heard.  Pulmonary:      Effort: Pulmonary effort is normal. No respiratory distress.      Breath sounds: Normal breath sounds. No wheezing or rales.   Abdominal:      General: There is no distension.      Palpations: Abdomen is soft. There is no mass.      Tenderness: There is no abdominal tenderness. There is no guarding.   Musculoskeletal:      Right lower leg: No edema.      Left lower leg: No edema.   Lymphadenopathy:      Cervical: No cervical adenopathy.   Skin:     Capillary Refill: Capillary refill takes less than 2 seconds.      Findings: No rash.   Neurological:      General: No focal deficit present.      Mental Status: He is alert and oriented to person, place, and time.      Cranial Nerves: No cranial nerve deficit.      Coordination: Coordination normal.   Psychiatric:         Mood and Affect: Mood normal.         Behavior: Behavior normal.         Thought Content: Thought content normal.         Judgment: Judgment normal.       Assessment:       1. Type 2 diabetes mellitus with microalbuminuria, with long-term current use of insulin    2. Statin myopathy    3. S/P CABG x 2    4. PVD (peripheral vascular disease)    5. Other cirrhosis of liver    6. DELONTE on CPAP    7. Mixed hyperlipidemia    8. Mild intermittent asthma without complication    9. HCC (hepatocellular carcinoma)    10. Gastroesophageal reflux disease, unspecified whether esophagitis present    11. Essential hypertension    12. Coronary artery disease involving native coronary artery of native heart without angina pectoris    13. Class 2 severe obesity due to excess calories with serious comorbidity and body mass index (BMI) of 37.0 to 37.9 in adult    14. Bilateral carotid artery stenosis    15. Asbestos exposure    16. Aortic atherosclerosis     17. Acquired hypothyroidism    18. Benign prostatic hyperplasia with nocturia    19. Prostate cancer screening    20. Type 2 diabetes mellitus with chronic kidney disease, with long-term current use of insulin, unspecified CKD stage        Plan:       Type 2 diabetes mellitus with microalbuminuria, with long-term current use of insulin  -     Comprehensive Metabolic Panel; Future; Expected date: 04/14/2023  -     Lipid Panel; Future; Expected date: 04/14/2023  -     Microalbumin/Creatinine Ratio, Urine; Future; Expected date: 04/14/2023  -     Hemoglobin A1C; Future; Expected date: 04/14/2023    Statin myopathy    S/P CABG x 2    PVD (peripheral vascular disease)    Other cirrhosis of liver    DELONTE on CPAP    Mixed hyperlipidemia    Mild intermittent asthma without complication    HCC (hepatocellular carcinoma)    Gastroesophageal reflux disease, unspecified whether esophagitis present    Essential hypertension    Coronary artery disease involving native coronary artery of native heart without angina pectoris    Class 2 severe obesity due to excess calories with serious comorbidity and body mass index (BMI) of 37.0 to 37.9 in adult    Bilateral carotid artery stenosis    Asbestos exposure    Aortic atherosclerosis    Acquired hypothyroidism  -     TSH; Future; Expected date: 04/14/2023  -     TSH; Future; Expected date: 04/14/2023  -     levothyroxine (SYNTHROID) 200 MCG tablet; Take 1 tablet (200 mcg total) by mouth every morning.  Dispense: 90 tablet; Refill: 3    Benign prostatic hyperplasia with nocturia    Prostate cancer screening  -     PSA, Screening; Future; Expected date: 04/14/2023    Type 2 diabetes mellitus with chronic kidney disease, with long-term current use of insulin, unspecified CKD stage  -     empagliflozin (JARDIANCE) 25 mg tablet; Take 1 tablet (25 mg total) by mouth once daily.  Dispense: 90 tablet; Refill: 3    Increase levothyroxine to 200 mcg. Recheck TSH in 2 months. Remembering  strategies for repatha d/w pt. B/P and BS at goal. Maintain meds. F/u 6 months with labs.

## 2023-05-02 ENCOUNTER — TELEPHONE (OUTPATIENT)
Dept: PULMONOLOGY | Facility: CLINIC | Age: 78
End: 2023-05-02
Payer: MEDICARE

## 2023-05-02 DIAGNOSIS — J45.20 MILD INTERMITTENT ASTHMA WITHOUT COMPLICATION: Primary | ICD-10-CM

## 2023-05-08 ENCOUNTER — HOSPITAL ENCOUNTER (OUTPATIENT)
Dept: RADIOLOGY | Facility: HOSPITAL | Age: 78
Discharge: HOME | End: 2023-05-08
Attending: INTERNAL MEDICINE
Payer: MEDICARE

## 2023-05-08 ENCOUNTER — OFFICE VISIT (OUTPATIENT)
Dept: PULMONOLOGY | Facility: CLINIC | Age: 78
End: 2023-05-08
Payer: MEDICARE

## 2023-05-08 VITALS
BODY MASS INDEX: 36.95 KG/M2 | OXYGEN SATURATION: 97 % | RESPIRATION RATE: 18 BRPM | HEART RATE: 81 BPM | WEIGHT: 235.44 LBS | DIASTOLIC BLOOD PRESSURE: 72 MMHG | SYSTOLIC BLOOD PRESSURE: 124 MMHG | HEIGHT: 67 IN

## 2023-05-08 DIAGNOSIS — E11.29 TYPE 2 DIABETES MELLITUS WITH MICROALBUMINURIA, WITH LONG-TERM CURRENT USE OF INSULIN: Chronic | ICD-10-CM

## 2023-05-08 DIAGNOSIS — J45.20 MILD INTERMITTENT ASTHMA WITHOUT COMPLICATION: ICD-10-CM

## 2023-05-08 DIAGNOSIS — I10 ESSENTIAL HYPERTENSION: Chronic | ICD-10-CM

## 2023-05-08 DIAGNOSIS — I48.0 PAROXYSMAL ATRIAL FIBRILLATION: ICD-10-CM

## 2023-05-08 DIAGNOSIS — I70.0 AORTIC ATHEROSCLEROSIS: ICD-10-CM

## 2023-05-08 DIAGNOSIS — Z79.4 TYPE 2 DIABETES MELLITUS WITH MICROALBUMINURIA, WITH LONG-TERM CURRENT USE OF INSULIN: Chronic | ICD-10-CM

## 2023-05-08 DIAGNOSIS — J45.20 MILD INTERMITTENT ASTHMA WITHOUT COMPLICATION: Primary | ICD-10-CM

## 2023-05-08 DIAGNOSIS — E78.2 MIXED HYPERLIPIDEMIA: ICD-10-CM

## 2023-05-08 DIAGNOSIS — R80.9 TYPE 2 DIABETES MELLITUS WITH MICROALBUMINURIA, WITH LONG-TERM CURRENT USE OF INSULIN: Chronic | ICD-10-CM

## 2023-05-08 DIAGNOSIS — G47.33 OSA ON CPAP: Chronic | ICD-10-CM

## 2023-05-08 DIAGNOSIS — E03.9 ACQUIRED HYPOTHYROIDISM: Chronic | ICD-10-CM

## 2023-05-08 PROCEDURE — 1101F PR PT FALLS ASSESS DOC 0-1 FALLS W/OUT INJ PAST YR: ICD-10-PCS | Mod: CPTII,S$GLB,, | Performed by: INTERNAL MEDICINE

## 2023-05-08 PROCEDURE — 99999 PR PBB SHADOW E&M-EST. PATIENT-LVL III: ICD-10-PCS | Mod: PBBFAC,,, | Performed by: INTERNAL MEDICINE

## 2023-05-08 PROCEDURE — 3072F LOW RISK FOR RETINOPATHY: CPT | Mod: CPTII,S$GLB,, | Performed by: INTERNAL MEDICINE

## 2023-05-08 PROCEDURE — 99214 PR OFFICE/OUTPT VISIT, EST, LEVL IV, 30-39 MIN: ICD-10-PCS | Mod: S$GLB,,, | Performed by: INTERNAL MEDICINE

## 2023-05-08 PROCEDURE — 3078F DIAST BP <80 MM HG: CPT | Mod: CPTII,S$GLB,, | Performed by: INTERNAL MEDICINE

## 2023-05-08 PROCEDURE — 3074F SYST BP LT 130 MM HG: CPT | Mod: CPTII,S$GLB,, | Performed by: INTERNAL MEDICINE

## 2023-05-08 PROCEDURE — 71046 XR CHEST PA AND LATERAL: ICD-10-PCS | Mod: 26,,, | Performed by: RADIOLOGY

## 2023-05-08 PROCEDURE — 71046 X-RAY EXAM CHEST 2 VIEWS: CPT | Mod: 26,,, | Performed by: RADIOLOGY

## 2023-05-08 PROCEDURE — 1160F RVW MEDS BY RX/DR IN RCRD: CPT | Mod: CPTII,S$GLB,, | Performed by: INTERNAL MEDICINE

## 2023-05-08 PROCEDURE — 1160F PR REVIEW ALL MEDS BY PRESCRIBER/CLIN PHARMACIST DOCUMENTED: ICD-10-PCS | Mod: CPTII,S$GLB,, | Performed by: INTERNAL MEDICINE

## 2023-05-08 PROCEDURE — 1157F PR ADVANCE CARE PLAN OR EQUIV PRESENT IN MEDICAL RECORD: ICD-10-PCS | Mod: CPTII,S$GLB,, | Performed by: INTERNAL MEDICINE

## 2023-05-08 PROCEDURE — 71046 X-RAY EXAM CHEST 2 VIEWS: CPT | Mod: TC,FY,PO

## 2023-05-08 PROCEDURE — 1159F PR MEDICATION LIST DOCUMENTED IN MEDICAL RECORD: ICD-10-PCS | Mod: CPTII,S$GLB,, | Performed by: INTERNAL MEDICINE

## 2023-05-08 PROCEDURE — 3288F PR FALLS RISK ASSESSMENT DOCUMENTED: ICD-10-PCS | Mod: CPTII,S$GLB,, | Performed by: INTERNAL MEDICINE

## 2023-05-08 PROCEDURE — 3072F PR LOW RISK FOR RETINOPATHY: ICD-10-PCS | Mod: CPTII,S$GLB,, | Performed by: INTERNAL MEDICINE

## 2023-05-08 PROCEDURE — 1101F PT FALLS ASSESS-DOCD LE1/YR: CPT | Mod: CPTII,S$GLB,, | Performed by: INTERNAL MEDICINE

## 2023-05-08 PROCEDURE — 3078F PR MOST RECENT DIASTOLIC BLOOD PRESSURE < 80 MM HG: ICD-10-PCS | Mod: CPTII,S$GLB,, | Performed by: INTERNAL MEDICINE

## 2023-05-08 PROCEDURE — 99999 PR PBB SHADOW E&M-EST. PATIENT-LVL III: CPT | Mod: PBBFAC,,, | Performed by: INTERNAL MEDICINE

## 2023-05-08 PROCEDURE — 1157F ADVNC CARE PLAN IN RCRD: CPT | Mod: CPTII,S$GLB,, | Performed by: INTERNAL MEDICINE

## 2023-05-08 PROCEDURE — 3074F PR MOST RECENT SYSTOLIC BLOOD PRESSURE < 130 MM HG: ICD-10-PCS | Mod: CPTII,S$GLB,, | Performed by: INTERNAL MEDICINE

## 2023-05-08 PROCEDURE — 1159F MED LIST DOCD IN RCRD: CPT | Mod: CPTII,S$GLB,, | Performed by: INTERNAL MEDICINE

## 2023-05-08 PROCEDURE — 3288F FALL RISK ASSESSMENT DOCD: CPT | Mod: CPTII,S$GLB,, | Performed by: INTERNAL MEDICINE

## 2023-05-08 PROCEDURE — 99214 OFFICE O/P EST MOD 30 MIN: CPT | Mod: S$GLB,,, | Performed by: INTERNAL MEDICINE

## 2023-05-08 RX ORDER — MELOXICAM 15 MG/1
15 TABLET ORAL
Status: ON HOLD | COMMUNITY
Start: 2023-03-29 | End: 2024-01-26 | Stop reason: HOSPADM

## 2023-05-08 NOTE — PROGRESS NOTES
Subjective:       Patient ID: Erendira Pretty is a 77 y.o. male.         Immunization History   Administered Date(s) Administered    COVID-19, MRNA, LN-S, PF (Pfizer) (Purple Cap) 2021, 2021, 2021    Hepatitis A 2003, 2004    Hepatitis A, Pediatric/Adolescent, 2 Dose 2003, 2004    Hepatitis B 2003, 2003, 2004, 2014, 2014    Hepatitis B, Adult 2014, 2014, 10/20/2014    Hepatitis B, Pediatric/Adolescent 2003, 2003, 2004    Influenza 2004, 10/17/2007, 10/20/2008, 10/07/2009, 10/20/2010    Influenza (FLUAD) - Trivalent - Adjuvanted - PF (65+) 2018, 2019    Influenza - High Dose - PF (65 years and older) 10/03/2011, 10/29/2013, 10/21/2014, 2015, 11/10/2016, 10/18/2017, 2018, 10/01/2020, 2021    Influenza - Quadrivalent - PF *Preferred* (6 months and older) 2004    Influenza - Trivalent (ADULT) 2004, 10/17/2007, 10/20/2008, 10/07/2009, 10/20/2010, 2012    Influenza A (H1N1) 2009 Monovalent - IM - PF 2009    Influenza Split 2012    Pneumococcal Conjugate - 13 Valent 2015    Pneumococcal Polysaccharide - 23 Valent 2014    Tdap 2014    Zoster 2012    Zoster Recombinant 2018, 2019, 2019     Social History     Tobacco Use   Smoking Status Former    Packs/day: 0.50    Years: 4.00    Pack years: 2.00    Types: Cigarettes    Quit date: 1972    Years since quittin.9   Smokeless Tobacco Former    Types: Chew    Quit date: 1976      Asthma Control Test  In the past 4  weeks, how much of the time did your asthma keep you from getting as much done at work, school or at home?: None of the time  During the past 4 weeks, how often have you had shortness of breath?: Not at all  During the past 4 weeks, how often did your asthma symptoms (wheezing, couging, shortness of breath, chest tightness or pain) wake you up at  night or earlier than usual in the morning?: Once or twice  During the past 4 weeks, how often have you used your rescue inhaler or nebulizer medication (such as albuterol)?: Once a week or less  How would you rate your asthma control during the past 4 weeks?: Well controlled  If your score is 19 or less, your asthma may not be under control: 22         EPWORTH SLEEPINESS SCALE 5/8/2023   Sitting and reading 0   Watching TV 3   Sitting, inactive in a public place (e.g. a theatre or a meeting) 0   As a passenger in a car for an hour without a break 0   Lying down to rest in the afternoon when circumstances permit 2   Sitting and talking to someone 0   Sitting quietly after a lunch without alcohol 3   In a car, while stopped for a few minutes in traffic 0   Total score 8           MMRC Dyspnea Scale (4 is worst)     [] MMRC 0: Dyspneic on strenuous excercise (0 points)    [] MMRC 1: Dyspneic on walking a slight hill (0 points)    [] MMRC 2: Dyspneic on walking level ground; must stop occasionally due to breathlessness (1 point)    [] MMRC 3: Must stop for breathlessness after walking 100 yards or after a few minutes (2 points)    [] MMRC 4: Cannot leave house; breathless on dressing/undressing (3 points)        Chief Complaint: Asthma    Mr Maria De Jesus Krishna is 76y.o.   DELONTE on CPAP, Chr wheezy asthmatic bronchitis, Chr nasal congestion  No interval exacerbations  immunizations current  Has no SOB, no cough, congestion  Lingering issue with leg muscle aches: vascular studies normal  LV 04/28/2021  Has new CPAP machine: Dream station 2  Here to review results  1. CPAP Download: CPAP 11 cm, Usage > 4 hrs was 100%  2. Spirometry was Normal  3. CXR Normal  Average AHI 2.5  Asthma score 25     Using nebuliser  Currently on SYMBICORT: refills sent.  No daytime sleepiness  Ep worth score 12   Immunizations are current    05/08/2023  Followup  No cough, No SOB, No wheezing  Recently seen ENT  vocal cord ulcer: improved now off  "Symbicort  CPAp 11 cm  Usage > 4 hrs was 3.8  Usage > 4 hrs was 92.2%  CXR reviewed  [place] 8    Asthma  There is no cough, shortness of breath, sputum production or wheezing. Pertinent negatives include no headaches or orthopnea. His past medical history is significant for asthma.   Review of Systems   Constitutional:  Negative for fatigue.   HENT:  Negative for voice change and congestion.    Eyes: Negative.    Respiratory:  Negative for snoring, cough, sputum production, shortness of breath, wheezing, orthopnea, asthma nighttime symptoms, pleurisy, dyspnea on extertion, use of rescue inhaler, somnolence and Paroxysmal Nocturnal Dyspnea.    Cardiovascular:  Negative for leg swelling.   Genitourinary: Negative.    Endocrine: endocrine negative    Musculoskeletal: Negative.    Skin: Negative.    Gastrointestinal: Negative.    Neurological:  Negative for headaches.   Psychiatric/Behavioral: Negative.     All other systems reviewed and are negative.    Objective:       Vitals:    05/08/23 1330   BP: 124/72   Pulse: 81   Resp: 18   SpO2: 97%   Weight: 106.8 kg (235 lb 7.2 oz)   Height: 5' 7" (1.702 m)       Physical Exam   Constitutional: He is oriented to person, place, and time. He appears well-developed and well-nourished. He is obese.   HENT:   Head: Normocephalic.   Nose: No mucosal edema.   Mouth/Throat: Oropharynx is clear and moist. Mallampati Score: III.   Cardiovascular: Normal rate, regular rhythm and intact distal pulses.   No murmur heard.  Pulmonary/Chest: Normal expansion, symmetric chest wall expansion, effort normal and breath sounds normal. He has no decreased breath sounds. He has no rhonchi. He has no rales.   Abdominal: Soft. Bowel sounds are normal.   Musculoskeletal:         General: No edema. Normal range of motion.      Cervical back: Normal range of motion and neck supple.   Lymphadenopathy:     He has no cervical adenopathy.   Neurological: He is alert and oriented to person, place, and " time. He has normal reflexes.   Skin: Skin is warm and dry.   Psychiatric: He has a normal mood and affect.   Nursing note and vitals reviewed.  Personal Diagnostic Review        Personal Diagnostic Review  [x]  DOWNLOAD  Compliance Information 2/8/2023 - 5/8/2023  Compliance Summary  2/8/2023 - 5/8/2023 (90 days)  Days with Device Usage 83 days  Days without Device Usage 7 days  Percent Days with Device Usage 92.2%  Cumulative Usage 31 days 9 mins. 58 secs.  Maximum Usage (1 Day) 10 hrs. 56 mins. 8 secs.  Average Usage (All Days) 8 hrs. 16 mins. 6 secs.  Average Usage (Days Used) 8 hrs. 57 mins. 57 secs.  Minimum Usage (1 Day) 6 hrs. 14 mins. 24 secs.  Percent of Days with Usage >= 4 Hours 92.2%  Percent of Days with Usage < 4 Hours 7.8%  Date Range  Total Blower Time 31 days 7 hrs. 2 mins. 44 secs.  CPAP Summary  Average Time in Large Leak Per Day 50 secs.  Average AHI 3.8  CPAP 11.0 cmH2O      [x]  SPIROMETRY        MRI Abdomen W WO Contrast  Narrative: EXAMINATION:  MRI ABDOMEN W WO CONTRAST    CLINICAL HISTORY:  HCC;  Liver cell carcinoma    TECHNIQUE:  Multiplanar multisequence MR imaging of the abdomen was performed with and without contrast.  10 cc of Gadavist was administered without immediate complication.    COMPARISON:  08/29/2022    FINDINGS:  The liver again demonstrates a peripheral nodular contour most consistent with cirrhosis.  There are multiple hepatic cysts again noted in within the left hepatic lobe and unchanged in appearance.  There is a post ablation cavity within the right hepatic lobe that demonstrates no nodular areas of enhancement or washout with some stable enhancement within the liver peripheral to this post ablation cavity.  Findings are most consistent with post treatment changes.  No new sites of arterial enhancement seen within the liver.    The gallbladder is present and unremarkable.  No biliary ductal dilation.  The pancreas demonstrates no focal abnormality.  There are  numerous bilateral renal cysts the majority of which are sub 5 mm in size.  The adrenals are unremarkable in appearance.  The spleen is not enlarged.  No ascites or adenopathy suggested.  Impression: 1. Post ablation changes seen within the right hepatic lobe.  No evidence to suggest recurrent or residual disease.  No new sites of disease identified.  2. Remaining findings as discussed above and stable.    Electronically signed by: David Soler DO  Date:    2023  Time:    12:03      No flowsheet data found.      Assessment:       Problem List Items Addressed This Visit       Type 2 diabetes mellitus with microalbuminuria, with long-term current use of insulin (Chronic)    Essential hypertension (Chronic)     Stable on NORVASC           Acquired hypothyroidism (Chronic)     Stable on Synthroid           DELONTE on CPAP (Chronic)     Benefits and compliant with CPAP 11 cm with optimal control AHI: 3.8  Dream station 2  Nasal wisp mask  HME: [facility]   Using CPAP and benefits  Usage >  92.2% was 92.2%           Relevant Orders    CPAP/BIPAP SUPPLIES    Paroxysmal atrial fibrillation    Aortic atherosclerosis    BMI 36.0-36.9,adult     General weight loss/lifestyle modification strategies discussed (elicit support from others; identify saboteurs; non-food rewards).  Diet interventions: low calorie (1000 kCal/d) deficit diet          Mild intermittent asthma without complication - Primary     ACT score 22  immunizations are current     Off symbicort     CXR stable                 Relevant Orders    Spirometry with/without bronchodilator    Fraction of  Nitric Oxide    Mixed hyperlipidemia     STABLE ON ZETIA, TRICOR               Plan:          Requested Prescriptions      No prescriptions requested or ordered in this encounter     Orders Placed This Encounter   Procedures    CPAP/BIPAP SUPPLIES     Order Specific Question:   Length of need (1-99 months):     Answer:   99     Order Specific Question:   Choose  ONE mask type and its corresponding cushions and/or pillows:     Answer:    Nasal Mask, 1 per 90 days:  Nasal Cushions, (6 per 90 days):  Nasal Pillows, (6 per 90 days)     Order Specific Question:   Choose EITHER Heated or Non-Heated Tubjing     Answer:    Non-Heated Tubing, 1 per 90 days     Order Specific Question:   All other supplies as needed as listed below:     Answer:    Headgear, 1 per 180 days     Order Specific Question:   All other supplies as needed as listed below:     Answer:    Chin Strap, 1 per 180 days     Order Specific Question:   All other supplies as needed as listed below:     Answer:    Disposable Filter, 6 per 90 days     Order Specific Question:   All other supplies as needed as listed below:     Answer:    Non-Disposable Filter, 1 per 180 days     Order Specific Question:   All other supplies as needed as listed below:     Answer:    Exhalation Port, contact payer for quantity/frequency     Order Specific Question:   All other supplies as needed as listed below:     Answer:    Humidifier Chamber, 1 per 180 days    Spirometry with/without bronchodilator     Standing Status:   Future     Standing Expiration Date:   2024     Order Specific Question:   Release to patient     Answer:   Immediate    Fraction of  Nitric Oxide     Standing Status:   Future     Standing Expiration Date:   2024     Order Specific Question:   Release to patient     Answer:   Immediate         Follow up in about 6 months (around 2023), or cpap supplies, weight loss, [place], [place].    This note was prepared using voice recognition system and is likely to have sound alike errors that may have been overlooked even after proof reading.  Please call me with any questions    Discussed diagnosis, its evaluation, treatment and usual course. All questions answered.    Thank you for the courtesy of participating in the care of this patient    Rishi  MD [place]

## 2023-05-08 NOTE — ASSESSMENT & PLAN NOTE
Spoke with mom and she stated patient's eye is unchanged. Mom will continue doing warm compresses and see how she does over the weekend. Mom will be cancelling appointment    Stable on Synthroid

## 2023-05-08 NOTE — ASSESSMENT & PLAN NOTE
Benefits and compliant with CPAP 11 cm with optimal control AHI: 3.8  Dream station 2  Nasal wisp mask  HME: [facility]   Using CPAP and benefits  Usage >  92.2% was 92.2%

## 2023-05-10 ENCOUNTER — TELEPHONE (OUTPATIENT)
Dept: HEPATOLOGY | Facility: CLINIC | Age: 78
End: 2023-05-10
Payer: MEDICARE

## 2023-05-10 NOTE — TELEPHONE ENCOUNTER
----- Message from Anna Marie sent at 5/10/2023  8:42 AM CDT -----  Contact: jaspal Rapp is requesting a call to schedule an appointment. Please call him back at [phone].        Thanks  DD

## 2023-05-10 NOTE — TELEPHONE ENCOUNTER
Returned patient's call no answer but I did leave a detailed voicemail message for the patient to give me a call.

## 2023-05-12 ENCOUNTER — TELEPHONE (OUTPATIENT)
Dept: HEPATOLOGY | Facility: CLINIC | Age: 78
End: 2023-05-12
Payer: MEDICARE

## 2023-05-12 ENCOUNTER — OFFICE VISIT (OUTPATIENT)
Dept: GASTROENTEROLOGY | Facility: CLINIC | Age: 78
End: 2023-05-12
Payer: MEDICARE

## 2023-05-12 VITALS
DIASTOLIC BLOOD PRESSURE: 86 MMHG | WEIGHT: 234.13 LBS | HEART RATE: 85 BPM | SYSTOLIC BLOOD PRESSURE: 142 MMHG | BODY MASS INDEX: 36.75 KG/M2 | HEIGHT: 67 IN

## 2023-05-12 DIAGNOSIS — I85.10 SECONDARY ESOPHAGEAL VARICES WITHOUT BLEEDING: ICD-10-CM

## 2023-05-12 DIAGNOSIS — R13.19 ESOPHAGEAL DYSPHAGIA: ICD-10-CM

## 2023-05-12 DIAGNOSIS — K74.69 OTHER CIRRHOSIS OF LIVER: ICD-10-CM

## 2023-05-12 DIAGNOSIS — R07.2 SUBSTERNAL CHEST PAIN: Primary | ICD-10-CM

## 2023-05-12 PROCEDURE — 99999 PR PBB SHADOW E&M-EST. PATIENT-LVL V: CPT | Mod: PBBFAC,,, | Performed by: NURSE PRACTITIONER

## 2023-05-12 PROCEDURE — 1126F AMNT PAIN NOTED NONE PRSNT: CPT | Mod: CPTII,S$GLB,, | Performed by: NURSE PRACTITIONER

## 2023-05-12 PROCEDURE — 99999 PR PBB SHADOW E&M-EST. PATIENT-LVL V: ICD-10-PCS | Mod: PBBFAC,,, | Performed by: NURSE PRACTITIONER

## 2023-05-12 PROCEDURE — 1101F PR PT FALLS ASSESS DOC 0-1 FALLS W/OUT INJ PAST YR: ICD-10-PCS | Mod: CPTII,S$GLB,, | Performed by: NURSE PRACTITIONER

## 2023-05-12 PROCEDURE — 3077F SYST BP >= 140 MM HG: CPT | Mod: CPTII,S$GLB,, | Performed by: NURSE PRACTITIONER

## 2023-05-12 PROCEDURE — 1157F PR ADVANCE CARE PLAN OR EQUIV PRESENT IN MEDICAL RECORD: ICD-10-PCS | Mod: CPTII,S$GLB,, | Performed by: NURSE PRACTITIONER

## 2023-05-12 PROCEDURE — 1101F PT FALLS ASSESS-DOCD LE1/YR: CPT | Mod: CPTII,S$GLB,, | Performed by: NURSE PRACTITIONER

## 2023-05-12 PROCEDURE — 3079F PR MOST RECENT DIASTOLIC BLOOD PRESSURE 80-89 MM HG: ICD-10-PCS | Mod: CPTII,S$GLB,, | Performed by: NURSE PRACTITIONER

## 2023-05-12 PROCEDURE — 1159F PR MEDICATION LIST DOCUMENTED IN MEDICAL RECORD: ICD-10-PCS | Mod: CPTII,S$GLB,, | Performed by: NURSE PRACTITIONER

## 2023-05-12 PROCEDURE — 3288F PR FALLS RISK ASSESSMENT DOCUMENTED: ICD-10-PCS | Mod: CPTII,S$GLB,, | Performed by: NURSE PRACTITIONER

## 2023-05-12 PROCEDURE — 3072F LOW RISK FOR RETINOPATHY: CPT | Mod: CPTII,S$GLB,, | Performed by: NURSE PRACTITIONER

## 2023-05-12 PROCEDURE — 1160F PR REVIEW ALL MEDS BY PRESCRIBER/CLIN PHARMACIST DOCUMENTED: ICD-10-PCS | Mod: CPTII,S$GLB,, | Performed by: NURSE PRACTITIONER

## 2023-05-12 PROCEDURE — 3079F DIAST BP 80-89 MM HG: CPT | Mod: CPTII,S$GLB,, | Performed by: NURSE PRACTITIONER

## 2023-05-12 PROCEDURE — 1159F MED LIST DOCD IN RCRD: CPT | Mod: CPTII,S$GLB,, | Performed by: NURSE PRACTITIONER

## 2023-05-12 PROCEDURE — 99214 OFFICE O/P EST MOD 30 MIN: CPT | Mod: S$GLB,,, | Performed by: NURSE PRACTITIONER

## 2023-05-12 PROCEDURE — 3288F FALL RISK ASSESSMENT DOCD: CPT | Mod: CPTII,S$GLB,, | Performed by: NURSE PRACTITIONER

## 2023-05-12 PROCEDURE — 1157F ADVNC CARE PLAN IN RCRD: CPT | Mod: CPTII,S$GLB,, | Performed by: NURSE PRACTITIONER

## 2023-05-12 PROCEDURE — 99214 PR OFFICE/OUTPT VISIT, EST, LEVL IV, 30-39 MIN: ICD-10-PCS | Mod: S$GLB,,, | Performed by: NURSE PRACTITIONER

## 2023-05-12 PROCEDURE — 1160F RVW MEDS BY RX/DR IN RCRD: CPT | Mod: CPTII,S$GLB,, | Performed by: NURSE PRACTITIONER

## 2023-05-12 PROCEDURE — 3077F PR MOST RECENT SYSTOLIC BLOOD PRESSURE >= 140 MM HG: ICD-10-PCS | Mod: CPTII,S$GLB,, | Performed by: NURSE PRACTITIONER

## 2023-05-12 PROCEDURE — 1126F PR PAIN SEVERITY QUANTIFIED, NO PAIN PRESENT: ICD-10-PCS | Mod: CPTII,S$GLB,, | Performed by: NURSE PRACTITIONER

## 2023-05-12 PROCEDURE — 3072F PR LOW RISK FOR RETINOPATHY: ICD-10-PCS | Mod: CPTII,S$GLB,, | Performed by: NURSE PRACTITIONER

## 2023-05-12 NOTE — PROGRESS NOTES
"Clinic Follow Up:  [facility] Follow Up Note    Reason for Follow Up:  The primary encounter diagnosis was Substernal chest pain. Diagnoses of Esophageal dysphagia, Secondary esophageal varices without bleeding, and Other cirrhosis of liver were also pertinent to this visit.    PCP: FRANCIE Callaway Jr       HPI:  This is a 77 y.o. male here for follow up.   He reports severe/intense pain over substernal chest area. This started 2 weeks ago-- first episode was while drinking water. The second episode occurred Monday and was while eating a sandwich. He can start feeling it go down his esophagus. When this happens he feels like he is "about to pass out". He will start seeing black and will get dizzy. This occurred while he was driving the first time which caused him concern.   He has cirrhosis with HCC and EV-- followed by Dr. Tomas.     Review of Systems   Cardiovascular:  Positive for chest pain.   Gastrointestinal:  Negative for abdominal distention, abdominal pain, anal bleeding, blood in stool, constipation, diarrhea, nausea, rectal pain and vomiting.   Neurological:  Positive for dizziness, syncope (near syncope) and light-headedness.     Medical History:  Past Medical History:   Diagnosis Date    Aortic stenosis     Asthma     Benign prostatic hyperplasia with nocturia     Bilateral carotid artery stenosis 7/21/2021    US CAROTID BILATERAL 07/14/2021 IMPRESSION: Atherosclerosis with suspected stenosis greater than 50% at the right proximal ICA visually. Velocities are discordant and appear improved from prior. Atherosclerosis on the left with no evidence of flow-limiting stenosis greater than 50%.  FINDINGS: Right: Internal Carotid Artery (ICA): Peak systolic velocity 118 cm/sec. End diastolic velocity 35 cm/sec    BPH (benign prostatic hyperplasia)     CAD (coronary artery disease)     40% lesion 2002;[facility]    Cirrhosis     Claudication 4/9/2014    Colon polyp     COPD (chronic obstructive " pulmonary disease)     ED (erectile dysfunction)     Encounter for blood transfusion     Ex-smoker     Hearing loss NEC     Hepatitis C     Cured;reji brown 2015    Hyperlipidemia     Hypertension     Hypothyroid     s/p tx graves    Open angle with borderline findings and low glaucoma risk in both eyes 9/22/2020    DELONTE on CPAP     Osteoarthritis     PVD (peripheral vascular disease)     Secondary esophageal varices without bleeding 6/26/2017    Type 2 diabetes mellitus        Surgical History:   Past Surgical History:   Procedure Laterality Date    CARDIAC CATHETERIZATION      CARDIAC SURGERY      CARPAL TUNNEL RELEASE Left     CATARACT EXTRACTION      CATARACT EXTRACTION W/ INTRAOCULAR LENS  IMPLANT, BILATERAL  2008    CATHETERIZATION OF BOTH LEFT AND RIGHT HEART N/A 11/2/2018    Procedure: CATHETERIZATION, HEART, BOTH LEFT AND RIGHT;  Surgeon: Frank Gallardo MD;  Location: [facility] CATH LAB;  Service: Cardiology;  Laterality: N/A;    COLONOSCOPY  8/2013    COLONOSCOPY N/A 5/30/2016    Procedure: COLONOSCOPY;  Surgeon: Anna Tomas MD;  Location: [facility] ENDO;  Service: Endoscopy;  Laterality: N/A;    COLONOSCOPY N/A 7/17/2019    Procedure: COLONOSCOPY;  Surgeon: David Carter III, MD;  Location: [facility] ENDO;  Service: Endoscopy;  Laterality: N/A;    COMPUTED TOMOGRAPHY N/A 9/9/2019    Procedure: CT (COMPUTED TOMOGRAPHY);  Surgeon: Sri Diagnostic Provider;  Location: [facility] OR;  Service: General;  Laterality: N/A;  Microwave ablation to be done in CT.  Need CRNA and cart    COMPUTED TOMOGRAPHY N/A 1/25/2022    Procedure: Liver Microwave Ablation;  Surgeon: Red Puentes MD;  Location: [facility];  Service: General;  Laterality: N/A;    CORONARY ARTERY BYPASS GRAFT (CABG) N/A 11/5/2018    Procedure: CORONARY ARTERY BYPASS GRAFT (CABG);  Surgeon: Bear De Luna MD;  Location: [facility] OR;  Service: Cardiothoracic;  Laterality: N/A;  TWO VESSEL BYPASS WITH AORTOTOMY AND EXCISION OF ATHEROSCLEROTIC PLAQUE     CORONARY BYPASS GRAFT ANGIOGRAPHY  3/2/2020    Procedure: Bypass graft study;  Surgeon: Cora Cormier MD;  Location: [facility] CATH LAB;  Service: Cardiology;;    ELBOW BURSA SURGERY Right 2010    ENDOSCOPIC HARVEST OF VEIN Left 11/5/2018    Procedure: SURGICAL PROCUREMENT, VEIN, ENDOSCOPIC;  Surgeon: Bear De Luna MD;  Location: [facility] OR;  Service: Cardiothoracic;  Laterality: Left;    ESOPHAGOGASTRODUODENOSCOPY N/A 7/17/2019    Procedure: ESOPHAGOGASTRODUODENOSCOPY (EGD);  Surgeon: David Carter III, MD;  Location: [facility] ENDO;  Service: Endoscopy;  Laterality: N/A;    ESOPHAGOGASTRODUODENOSCOPY N/A 12/29/2022    Procedure: ESOPHAGOGASTRODUODENOSCOPY (EGD);  Surgeon: Issa Gayle MD;  Location: [facility] ENDO;  Service: Endoscopy;  Laterality: N/A;    ETHMOIDECTOMY Bilateral 3/27/2019    Procedure: ETHMOIDECTOMY;  Surgeon: Alejandro Parkinson MD;  Location: [facility] OR;  Service: ENT;  Laterality: Bilateral;    EYE SURGERY      FOOT SURGERY      FRONTAL SINUS OBLITERATION Bilateral 3/27/2019    Procedure: SINUSOTOMY, FRONTAL SINUS, OBLITERATIVE;  Surgeon: Alejandro Parkinson MD;  Location: [facility] OR;  Service: ENT;  Laterality: Bilateral;    FUNCTIONAL ENDOSCOPIC SINUS SURGERY (FESS) Bilateral 3/27/2019    Procedure: FESS (FUNCTIONAL ENDOSCOPIC SINUS SURGERY);  Surgeon: Alejandro Parkinson MD;  Location: [facility] OR;  Service: ENT;  Laterality: Bilateral;  Left Keila bullosa resection     KNEE ARTHROSCOPY W/ MENISCAL REPAIR Left 06/2019    dr boykin    KNEE SURGERY Right     LEFT HEART CATHETERIZATION Left 3/2/2020    Procedure: CATHETERIZATION, HEART, LEFT;  Surgeon: Cora Cormier MD;  Location: [facility] CATH LAB;  Service: Cardiology;  Laterality: Left;    LIVER BIOPSY  01/2022    MAXILLARY ANTROSTOMY Bilateral 3/27/2019    Procedure: MAXILLARY ANTROSTOMY;  Surgeon: Alejandro Parkinson MD;  Location: [facility] OR;  Service: ENT;  Laterality: Bilateral;    NASAL SEPTOPLASTY N/A 3/27/2019    Procedure: SEPTOPLASTY, NOSE;  Surgeon: Alejandro Parkinson MD;  Location: [facility]  OR;  Service: ENT;  Laterality: N/A;    RECTAL SURGERY      TRANSURETHRAL RESECTION OF PROSTATE (TURP) WITHOUT USE OF LASER N/A 2018    Procedure: TURP, WITHOUT USING LASER;  Surgeon: Cooper Cordova IV, MD;  Location: [facility] OR;  Service: Urology;  Laterality: N/A;    TRIGGER FINGER RELEASE Left        Family History:   Family History   Problem Relation Age of Onset    Heart disease Mother     Heart disease Father     Heart disease Brother     Colon cancer Neg Hx        Social History:   Social History     Tobacco Use    Smoking status: Former     Packs/day: 0.50     Years: 4.00     Pack years: 2.00     Types: Cigarettes     Quit date: 1972     Years since quittin.9    Smokeless tobacco: Former     Types: Chew     Quit date: 1976   Substance Use Topics    Alcohol use: Yes     Alcohol/week: 2.0 standard drinks     Types: 2 Cans of beer per week    Drug use: No       Allergies:   Review of patient's allergies indicates:   Allergen Reactions    Lipitor [atorvastatin] Other (See Comments)     Muscle aches and pains as well as fatigue.     Niacin preparations Other (See Comments)     Causes broken blood vessels and bruises at 4 times normal dose.    Statins-hmg-coa reductase inhibitors Other (See Comments)     Statins cause intolerable myalgias.    Iodinated contrast media Hives     Tachycardia    Omeprazole Hives and Itching    Prilosec [omeprazole magnesium] Hives and Itching       Home Medications:  Current Outpatient Medications on File Prior to Visit   Medication Sig Dispense Refill    ACCU-CHEK GRACE PLUS METER Misc AS DIRECTED 1 each 0    albuterol (VENTOLIN HFA) 90 mcg/actuation inhaler Inhale 2 puffs into the lungs every 6 (six) hours as needed for Wheezing or Shortness of Breath. Rescue 18 g 3    amLODIPine (NORVASC) 10 MG tablet TAKE 1 TABLET BY MOUTH ONCE A DAY (DOSE INCREASE) 30 tablet 11    aspirin (ECOTRIN) 81 MG EC tablet TAKE 1 TABLET BY MOUTH EVERY DAY 90 tablet 4    blood  sugar diagnostic (ACCU-CHEK GRACE PLUS TEST STRP) Strp TEST THREE TIMES A  strip 11    budesonide-formoterol 160-4.5 mcg (SYMBICORT) 160-4.5 mcg/actuation HFAA INHALE 2 PUFFS INTO THE LUNGS TWO TIMES A DAY. 10.2 g 11    carvediloL (COREG) 6.25 MG tablet TAKE 1 TABLET BY MOUTH TWICE DAILY WITH MEALS 60 tablet 5    empagliflozin (JARDIANCE) 25 mg tablet Take 1 tablet (25 mg total) by mouth once daily. 90 tablet 3    evolocumab (REPATHA SYRINGE) 140 mg/mL Syrg Inject 140 mg into the skin every 14 (fourteen) days. 2 each 11    famotidine (PEPCID) 40 MG tablet Take 1 tablet (40 mg total) by mouth every evening. 30 tablet 11    finasteride (PROSCAR) 5 mg tablet Take 1 tablet (5 mg total) by mouth once daily. 90 tablet 3    fluticasone propionate (FLONASE) 50 mcg/actuation nasal spray 2 sprays (100 mcg total) by Each Nostril route once daily. 18.2 mL 6    furosemide (LASIX) 40 MG tablet Take 1 tablet (40 mg total) by mouth 2 (two) times daily. 60 tablet 11    GLUCOSAMINE HCL/CHONDR ROSARIO A NA (OSTEO BI-FLEX ORAL) Take 1 tablet by mouth 2 (two) times daily.       ipratropium (ATROVENT) 21 mcg (0.03 %) nasal spray 2 sprays by Each Nostril route 2 (two) times daily. 30 mL 0    krill oil 500 mg Cap Take by mouth.      lancets (ACCU-CHEK FASTCLIX LANCET DRUM) Misc TEST TWO TIMES A  each 5    LANTUS SOLOSTAR U-100 INSULIN glargine 100 units/mL SubQ pen INJECT 44 UNITS UNDER THE SKIN EVERY EVENING 45 mL 0    levocetirizine (XYZAL) 5 MG tablet Take 1 tablet (5 mg total) by mouth every evening. 30 tablet 11    levothyroxine (SYNTHROID) 200 MCG tablet Take 1 tablet (200 mcg total) by mouth every morning. 90 tablet 3    lisinopriL 10 MG tablet TAKE 1 BY MOUTH EVERY DAY 30 tablet 7    meloxicam (MOBIC) 15 MG tablet Take 15 mg by mouth.      metFORMIN (GLUCOPHAGE) 500 MG tablet TAKE 1 TABLET BY MOUTH TWICE DAILY WITH MEALS 180 tablet 3    montelukast (SINGULAIR) 10 mg tablet TAKE 1 TABLET BY MOUTH ONCE DAILY 30 tablet 11     "neomycin-polymyxin-dexamethasone (DEXACINE) 3.5 mg/g-10,000 unit/g-0.1 % Oint Place into both eyes 2 (two) times a day. Apply to lid margins 3.5 g 0    pen needle, diabetic (BD ULTRA-FINE MINI PEN NEEDLE) 31 gauge x 3/16" Ndle USE AS DIRECTED 100 each 11    RABEprazole (ACIPHEX) 20 mg tablet Take 1 tablet (20 mg total) by mouth 2 (two) times daily. 60 tablet 11    sildenafiL (VIAGRA) 100 MG tablet Take 1/2 to 1 tablet by mouth one hour prior to intercourse. Max 100mg per day 30 tablet 1    sulfamethoxazole-trimethoprim 800-160mg (BACTRIM DS) 800-160 mg Tab Take 1 tablet by mouth 2 (two) times daily. 10 tablet 0    azelastine (ASTELIN) 137 mcg (0.1 %) nasal spray 2 sprays (274 mcg total) by Nasal route 2 (two) times daily. 30 mL 6     Current Facility-Administered Medications on File Prior to Visit   Medication Dose Route Frequency Provider Last Rate Last Admin    lactated ringers infusion   Intravenous Continuous Omaira Theodore MD   New Bag at 09/09/19 1117       BP (!) 142/86 (BP Location: Left arm, Patient Position: Sitting, BP Method: Large (Manual))   Pulse 85   Ht 5' 7" (1.702 m)   Wt 106.2 kg (234 lb 2.1 oz)   BMI 36.67 kg/m²   Body mass index is 36.67 kg/m².  Physical Exam  Constitutional:       General: He is not in acute distress.  HENT:      Head: Normocephalic.   Cardiovascular:      Rate and Rhythm: Normal rate and regular rhythm.      Heart sounds: Murmur heard.   Pulmonary:      Effort: Pulmonary effort is normal.      Breath sounds: Normal breath sounds. No wheezing.   Neurological:      General: No focal deficit present.      Mental Status: He is alert.   Psychiatric:         Mood and Affect: Mood normal.         Judgment: Judgment normal.       Labs: Pertinent labs reviewed.  CRC Screening: up to date     Assessment:   1. Substernal chest pain    2. Esophageal dysphagia    3. Secondary esophageal varices without bleeding    4. Other cirrhosis of liver    Patient with recent onset severe " substernal chest pain and dysphagia. This occurred twice over the last couple of weeks and only last a couple of seconds. He was drinking with the first episode and eating with the second. This give concern for GI etiology (such as esophageal spasm vs motility issue), however, he also describes a near-syncopal episode at this time which does not fit with GI etiology.     Recommendations:   - esophagram (as last EGD was 5 months ago)  - will send message to cardiology staff about getting him seen sooner than his next regular follow up in August.   - continue liver management per Hepatology     Substernal chest pain  -     [place] Esophagram Complete; Future; Expected date: 05/12/2023    Esophageal dysphagia  -     [place] Esophagram Complete; Future; Expected date: 05/12/2023    Secondary esophageal varices without bleeding    Other cirrhosis of liver    Return to Clinic:  Follow up to be determined after results/ procedure(s).    Thank you for the opportunity to participate in the care of this patient.  LIDA Fay

## 2023-05-12 NOTE — TELEPHONE ENCOUNTER
Returned patient's call, he states that he's having trouble swallowing. I scheduled him with our first available [facility] [place] today at 2:30, patient was told to arrive at 2 to which he voiced understanding.

## 2023-05-12 NOTE — H&P (VIEW-ONLY)
"Clinic Follow Up:  [facility] Follow Up Note    Reason for Follow Up:  The primary encounter diagnosis was Substernal chest pain. Diagnoses of Esophageal dysphagia, Secondary esophageal varices without bleeding, and Other cirrhosis of liver were also pertinent to this visit.    PCP: FRANCIE Clalaway Jr       HPI:  This is a 77 y.o. male here for follow up.   He reports severe/intense pain over substernal chest area. This started 2 weeks ago-- first episode was while drinking water. The second episode occurred Monday and was while eating a sandwich. He can start feeling it go down his esophagus. When this happens he feels like he is "about to pass out". He will start seeing black and will get dizzy. This occurred while he was driving the first time which caused him concern.   He has cirrhosis with HCC and EV-- followed by Dr. Tomas.     Review of Systems   Cardiovascular:  Positive for chest pain.   Gastrointestinal:  Negative for abdominal distention, abdominal pain, anal bleeding, blood in stool, constipation, diarrhea, nausea, rectal pain and vomiting.   Neurological:  Positive for dizziness, syncope (near syncope) and light-headedness.     Medical History:  Past Medical History:   Diagnosis Date    Aortic stenosis     Asthma     Benign prostatic hyperplasia with nocturia     Bilateral carotid artery stenosis 7/21/2021    US CAROTID BILATERAL 07/14/2021 IMPRESSION: Atherosclerosis with suspected stenosis greater than 50% at the right proximal ICA visually. Velocities are discordant and appear improved from prior. Atherosclerosis on the left with no evidence of flow-limiting stenosis greater than 50%.  FINDINGS: Right: Internal Carotid Artery (ICA): Peak systolic velocity 118 cm/sec. End diastolic velocity 35 cm/sec    BPH (benign prostatic hyperplasia)     CAD (coronary artery disease)     40% lesion 2002;lazo    Cirrhosis     Claudication 4/9/2014    Colon polyp     COPD (chronic obstructive " pulmonary disease)     ED (erectile dysfunction)     Encounter for blood transfusion     Ex-smoker     Hearing loss NEC     Hepatitis C     Cured;reji brown 2015    Hyperlipidemia     Hypertension     Hypothyroid     s/p tx graves    Open angle with borderline findings and low glaucoma risk in both eyes 9/22/2020    DELONTE on CPAP     Osteoarthritis     PVD (peripheral vascular disease)     Secondary esophageal varices without bleeding 6/26/2017    Type 2 diabetes mellitus        Surgical History:   Past Surgical History:   Procedure Laterality Date    CARDIAC CATHETERIZATION      CARDIAC SURGERY      CARPAL TUNNEL RELEASE Left     CATARACT EXTRACTION      CATARACT EXTRACTION W/ INTRAOCULAR LENS  IMPLANT, BILATERAL  2008    CATHETERIZATION OF BOTH LEFT AND RIGHT HEART N/A 11/2/2018    Procedure: CATHETERIZATION, HEART, BOTH LEFT AND RIGHT;  Surgeon: Frank Gallardo MD;  Location: [facility] CATH LAB;  Service: Cardiology;  Laterality: N/A;    COLONOSCOPY  8/2013    COLONOSCOPY N/A 5/30/2016    Procedure: COLONOSCOPY;  Surgeon: Anna Tomas MD;  Location: [facility] ENDO;  Service: Endoscopy;  Laterality: N/A;    COLONOSCOPY N/A 7/17/2019    Procedure: COLONOSCOPY;  Surgeon: David Carter III, MD;  Location: [facility] ENDO;  Service: Endoscopy;  Laterality: N/A;    COMPUTED TOMOGRAPHY N/A 9/9/2019    Procedure: CT (COMPUTED TOMOGRAPHY);  Surgeon: Sri Diagnostic Provider;  Location: [facility] OR;  Service: General;  Laterality: N/A;  Microwave ablation to be done in CT.  Need CRNA and cart    COMPUTED TOMOGRAPHY N/A 1/25/2022    Procedure: Liver Microwave Ablation;  Surgeon: Red Puentes MD;  Location: [facility];  Service: General;  Laterality: N/A;    CORONARY ARTERY BYPASS GRAFT (CABG) N/A 11/5/2018    Procedure: CORONARY ARTERY BYPASS GRAFT (CABG);  Surgeon: Bear De Luna MD;  Location: [facility] OR;  Service: Cardiothoracic;  Laterality: N/A;  TWO VESSEL BYPASS WITH AORTOTOMY AND EXCISION OF ATHEROSCLEROTIC PLAQUE     CORONARY BYPASS GRAFT ANGIOGRAPHY  3/2/2020    Procedure: Bypass graft study;  Surgeon: Cora Cormier MD;  Location: [facility] CATH LAB;  Service: Cardiology;;    ELBOW BURSA SURGERY Right 2010    ENDOSCOPIC HARVEST OF VEIN Left 11/5/2018    Procedure: SURGICAL PROCUREMENT, VEIN, ENDOSCOPIC;  Surgeon: Bear De Luna MD;  Location: [facility] OR;  Service: Cardiothoracic;  Laterality: Left;    ESOPHAGOGASTRODUODENOSCOPY N/A 7/17/2019    Procedure: ESOPHAGOGASTRODUODENOSCOPY (EGD);  Surgeon: David Carter III, MD;  Location: [facility] ENDO;  Service: Endoscopy;  Laterality: N/A;    ESOPHAGOGASTRODUODENOSCOPY N/A 12/29/2022    Procedure: ESOPHAGOGASTRODUODENOSCOPY (EGD);  Surgeon: Issa Gayle MD;  Location: [facility] ENDO;  Service: Endoscopy;  Laterality: N/A;    ETHMOIDECTOMY Bilateral 3/27/2019    Procedure: ETHMOIDECTOMY;  Surgeon: Alejandro Parkinson MD;  Location: [facility] OR;  Service: ENT;  Laterality: Bilateral;    EYE SURGERY      FOOT SURGERY      FRONTAL SINUS OBLITERATION Bilateral 3/27/2019    Procedure: SINUSOTOMY, FRONTAL SINUS, OBLITERATIVE;  Surgeon: Alejandro Parkinson MD;  Location: [facility] OR;  Service: ENT;  Laterality: Bilateral;    FUNCTIONAL ENDOSCOPIC SINUS SURGERY (FESS) Bilateral 3/27/2019    Procedure: FESS (FUNCTIONAL ENDOSCOPIC SINUS SURGERY);  Surgeon: Alejandro Parkinson MD;  Location: [facility] OR;  Service: ENT;  Laterality: Bilateral;  Left Keila bullosa resection     KNEE ARTHROSCOPY W/ MENISCAL REPAIR Left 06/2019    dr boykin    KNEE SURGERY Right     LEFT HEART CATHETERIZATION Left 3/2/2020    Procedure: CATHETERIZATION, HEART, LEFT;  Surgeon: Cora Cormier MD;  Location: [facility] CATH LAB;  Service: Cardiology;  Laterality: Left;    LIVER BIOPSY  01/2022    MAXILLARY ANTROSTOMY Bilateral 3/27/2019    Procedure: MAXILLARY ANTROSTOMY;  Surgeon: Alejandro Parkinson MD;  Location: [facility] OR;  Service: ENT;  Laterality: Bilateral;    NASAL SEPTOPLASTY N/A 3/27/2019    Procedure: SEPTOPLASTY, NOSE;  Surgeon: Alejandro Parkinson MD;  Location: [facility]  OR;  Service: ENT;  Laterality: N/A;    RECTAL SURGERY      TRANSURETHRAL RESECTION OF PROSTATE (TURP) WITHOUT USE OF LASER N/A 2018    Procedure: TURP, WITHOUT USING LASER;  Surgeon: Cooper Cordova IV, MD;  Location: [facility] OR;  Service: Urology;  Laterality: N/A;    TRIGGER FINGER RELEASE Left        Family History:   Family History   Problem Relation Age of Onset    Heart disease Mother     Heart disease Father     Heart disease Brother     Colon cancer Neg Hx        Social History:   Social History     Tobacco Use    Smoking status: Former     Packs/day: 0.50     Years: 4.00     Pack years: 2.00     Types: Cigarettes     Quit date: 1972     Years since quittin.9    Smokeless tobacco: Former     Types: Chew     Quit date: 1976   Substance Use Topics    Alcohol use: Yes     Alcohol/week: 2.0 standard drinks     Types: 2 Cans of beer per week    Drug use: No       Allergies:   Review of patient's allergies indicates:   Allergen Reactions    Lipitor [atorvastatin] Other (See Comments)     Muscle aches and pains as well as fatigue.     Niacin preparations Other (See Comments)     Causes broken blood vessels and bruises at 4 times normal dose.    Statins-hmg-coa reductase inhibitors Other (See Comments)     Statins cause intolerable myalgias.    Iodinated contrast media Hives     Tachycardia    Omeprazole Hives and Itching    Prilosec [omeprazole magnesium] Hives and Itching       Home Medications:  Current Outpatient Medications on File Prior to Visit   Medication Sig Dispense Refill    ACCU-CHEK GRACE PLUS METER Misc AS DIRECTED 1 each 0    albuterol (VENTOLIN HFA) 90 mcg/actuation inhaler Inhale 2 puffs into the lungs every 6 (six) hours as needed for Wheezing or Shortness of Breath. Rescue 18 g 3    amLODIPine (NORVASC) 10 MG tablet TAKE 1 TABLET BY MOUTH ONCE A DAY (DOSE INCREASE) 30 tablet 11    aspirin (ECOTRIN) 81 MG EC tablet TAKE 1 TABLET BY MOUTH EVERY DAY 90 tablet 4    blood  sugar diagnostic (ACCU-CHEK GRACE PLUS TEST STRP) Strp TEST THREE TIMES A  strip 11    budesonide-formoterol 160-4.5 mcg (SYMBICORT) 160-4.5 mcg/actuation HFAA INHALE 2 PUFFS INTO THE LUNGS TWO TIMES A DAY. 10.2 g 11    carvediloL (COREG) 6.25 MG tablet TAKE 1 TABLET BY MOUTH TWICE DAILY WITH MEALS 60 tablet 5    empagliflozin (JARDIANCE) 25 mg tablet Take 1 tablet (25 mg total) by mouth once daily. 90 tablet 3    evolocumab (REPATHA SYRINGE) 140 mg/mL Syrg Inject 140 mg into the skin every 14 (fourteen) days. 2 each 11    famotidine (PEPCID) 40 MG tablet Take 1 tablet (40 mg total) by mouth every evening. 30 tablet 11    finasteride (PROSCAR) 5 mg tablet Take 1 tablet (5 mg total) by mouth once daily. 90 tablet 3    fluticasone propionate (FLONASE) 50 mcg/actuation nasal spray 2 sprays (100 mcg total) by Each Nostril route once daily. 18.2 mL 6    furosemide (LASIX) 40 MG tablet Take 1 tablet (40 mg total) by mouth 2 (two) times daily. 60 tablet 11    GLUCOSAMINE HCL/CHONDR ROSARIO A NA (OSTEO BI-FLEX ORAL) Take 1 tablet by mouth 2 (two) times daily.       ipratropium (ATROVENT) 21 mcg (0.03 %) nasal spray 2 sprays by Each Nostril route 2 (two) times daily. 30 mL 0    krill oil 500 mg Cap Take by mouth.      lancets (ACCU-CHEK FASTCLIX LANCET DRUM) Misc TEST TWO TIMES A  each 5    LANTUS SOLOSTAR U-100 INSULIN glargine 100 units/mL SubQ pen INJECT 44 UNITS UNDER THE SKIN EVERY EVENING 45 mL 0    levocetirizine (XYZAL) 5 MG tablet Take 1 tablet (5 mg total) by mouth every evening. 30 tablet 11    levothyroxine (SYNTHROID) 200 MCG tablet Take 1 tablet (200 mcg total) by mouth every morning. 90 tablet 3    lisinopriL 10 MG tablet TAKE 1 BY MOUTH EVERY DAY 30 tablet 7    meloxicam (MOBIC) 15 MG tablet Take 15 mg by mouth.      metFORMIN (GLUCOPHAGE) 500 MG tablet TAKE 1 TABLET BY MOUTH TWICE DAILY WITH MEALS 180 tablet 3    montelukast (SINGULAIR) 10 mg tablet TAKE 1 TABLET BY MOUTH ONCE DAILY 30 tablet 11     "neomycin-polymyxin-dexamethasone (DEXACINE) 3.5 mg/g-10,000 unit/g-0.1 % Oint Place into both eyes 2 (two) times a day. Apply to lid margins 3.5 g 0    pen needle, diabetic (BD ULTRA-FINE MINI PEN NEEDLE) 31 gauge x 3/16" Ndle USE AS DIRECTED 100 each 11    RABEprazole (ACIPHEX) 20 mg tablet Take 1 tablet (20 mg total) by mouth 2 (two) times daily. 60 tablet 11    sildenafiL (VIAGRA) 100 MG tablet Take 1/2 to 1 tablet by mouth one hour prior to intercourse. Max 100mg per day 30 tablet 1    sulfamethoxazole-trimethoprim 800-160mg (BACTRIM DS) 800-160 mg Tab Take 1 tablet by mouth 2 (two) times daily. 10 tablet 0    azelastine (ASTELIN) 137 mcg (0.1 %) nasal spray 2 sprays (274 mcg total) by Nasal route 2 (two) times daily. 30 mL 6     Current Facility-Administered Medications on File Prior to Visit   Medication Dose Route Frequency Provider Last Rate Last Admin    lactated ringers infusion   Intravenous Continuous Omaira Theodore MD   New Bag at 09/09/19 1117       BP (!) 142/86 (BP Location: Left arm, Patient Position: Sitting, BP Method: Large (Manual))   Pulse 85   Ht 5' 7" (1.702 m)   Wt 106.2 kg (234 lb 2.1 oz)   BMI 36.67 kg/m²   Body mass index is 36.67 kg/m².  Physical Exam  Constitutional:       General: He is not in acute distress.  HENT:      Head: Normocephalic.   Cardiovascular:      Rate and Rhythm: Normal rate and regular rhythm.      Heart sounds: Murmur heard.   Pulmonary:      Effort: Pulmonary effort is normal.      Breath sounds: Normal breath sounds. No wheezing.   Neurological:      General: No focal deficit present.      Mental Status: He is alert.   Psychiatric:         Mood and Affect: Mood normal.         Judgment: Judgment normal.       Labs: Pertinent labs reviewed.  CRC Screening: up to date     Assessment:   1. Substernal chest pain    2. Esophageal dysphagia    3. Secondary esophageal varices without bleeding    4. Other cirrhosis of liver    Patient with recent onset severe " substernal chest pain and dysphagia. This occurred twice over the last couple of weeks and only last a couple of seconds. He was drinking with the first episode and eating with the second. This give concern for GI etiology (such as esophageal spasm vs motility issue), however, he also describes a near-syncopal episode at this time which does not fit with GI etiology.     Recommendations:   - esophagram (as last EGD was 5 months ago)  - will send message to cardiology staff about getting him seen sooner than his next regular follow up in August.   - continue liver management per Hepatology     Substernal chest pain  -     FL Esophagram Complete; Future; Expected date: 05/12/2023    Esophageal dysphagia  -     FL Esophagram Complete; Future; Expected date: 05/12/2023    Secondary esophageal varices without bleeding    Other cirrhosis of liver    Return to Clinic:  Follow up to be determined after results/ procedure(s).    Thank you for the opportunity to participate in the care of this patient.  LIDA Fay

## 2023-05-12 NOTE — TELEPHONE ENCOUNTER
----- Message from Reymundo Gamboa sent at 5/12/2023  9:05 AM CDT -----  Contact: [phone]  Type:  Patient Returning Call    Who Called:Erendira   Who Left Message for Patient:Mtluannclive  Does the patient know what this is regarding?:appt / problems/concerns  Would the patient rather a call back or a response via [facility]? Call back   Best Call Back Number:[phone]   Additional Information:

## 2023-05-17 DIAGNOSIS — R55 NEAR SYNCOPE: Primary | ICD-10-CM

## 2023-05-18 ENCOUNTER — HOSPITAL ENCOUNTER (OUTPATIENT)
Dept: CARDIOLOGY | Facility: HOSPITAL | Age: 78
Discharge: HOME | End: 2023-05-18
Attending: INTERNAL MEDICINE
Payer: MEDICARE

## 2023-05-18 ENCOUNTER — OFFICE VISIT (OUTPATIENT)
Dept: CARDIOLOGY | Facility: CLINIC | Age: 78
End: 2023-05-18
Payer: MEDICARE

## 2023-05-18 VITALS
HEART RATE: 46 BPM | SYSTOLIC BLOOD PRESSURE: 122 MMHG | OXYGEN SATURATION: 98 % | DIASTOLIC BLOOD PRESSURE: 80 MMHG | BODY MASS INDEX: 37.2 KG/M2 | HEIGHT: 67 IN | WEIGHT: 237 LBS

## 2023-05-18 DIAGNOSIS — I25.10 CORONARY ARTERY DISEASE INVOLVING NATIVE CORONARY ARTERY OF NATIVE HEART WITHOUT ANGINA PECTORIS: Chronic | ICD-10-CM

## 2023-05-18 DIAGNOSIS — I70.0 AORTIC ATHEROSCLEROSIS: ICD-10-CM

## 2023-05-18 DIAGNOSIS — I49.3 PVC (PREMATURE VENTRICULAR CONTRACTION): Primary | ICD-10-CM

## 2023-05-18 DIAGNOSIS — E78.2 MIXED HYPERLIPIDEMIA: ICD-10-CM

## 2023-05-18 DIAGNOSIS — I48.0 PAROXYSMAL ATRIAL FIBRILLATION: ICD-10-CM

## 2023-05-18 DIAGNOSIS — G72.0 STATIN MYOPATHY: ICD-10-CM

## 2023-05-18 DIAGNOSIS — Z95.1 S/P CABG X 2: ICD-10-CM

## 2023-05-18 DIAGNOSIS — T46.6X5A STATIN MYOPATHY: ICD-10-CM

## 2023-05-18 DIAGNOSIS — R42 DIZZINESS: ICD-10-CM

## 2023-05-18 DIAGNOSIS — I73.9 PVD (PERIPHERAL VASCULAR DISEASE): ICD-10-CM

## 2023-05-18 DIAGNOSIS — I20.9 ANGINA PECTORIS: ICD-10-CM

## 2023-05-18 DIAGNOSIS — I10 ESSENTIAL HYPERTENSION: Chronic | ICD-10-CM

## 2023-05-18 DIAGNOSIS — G47.33 OSA ON CPAP: Chronic | ICD-10-CM

## 2023-05-18 DIAGNOSIS — R55 NEAR SYNCOPE: ICD-10-CM

## 2023-05-18 PROCEDURE — 93010 EKG 12-LEAD: ICD-10-PCS | Mod: ,,, | Performed by: INTERNAL MEDICINE

## 2023-05-18 PROCEDURE — 99999 PR PBB SHADOW E&M-EST. PATIENT-LVL V: CPT | Mod: PBBFAC,,, | Performed by: INTERNAL MEDICINE

## 2023-05-18 PROCEDURE — 1126F AMNT PAIN NOTED NONE PRSNT: CPT | Mod: CPTII,,, | Performed by: INTERNAL MEDICINE

## 2023-05-18 PROCEDURE — 93005 ELECTROCARDIOGRAM TRACING: CPT | Mod: PO

## 2023-05-18 PROCEDURE — 1101F PT FALLS ASSESS-DOCD LE1/YR: CPT | Mod: CPTII,,, | Performed by: INTERNAL MEDICINE

## 2023-05-18 PROCEDURE — 1160F RVW MEDS BY RX/DR IN RCRD: CPT | Mod: CPTII,,, | Performed by: INTERNAL MEDICINE

## 2023-05-18 PROCEDURE — 99214 OFFICE O/P EST MOD 30 MIN: CPT | Mod: ,,, | Performed by: INTERNAL MEDICINE

## 2023-05-18 PROCEDURE — 3079F PR MOST RECENT DIASTOLIC BLOOD PRESSURE 80-89 MM HG: ICD-10-PCS | Mod: CPTII,,, | Performed by: INTERNAL MEDICINE

## 2023-05-18 PROCEDURE — 99999 PR PBB SHADOW E&M-EST. PATIENT-LVL V: ICD-10-PCS | Mod: PBBFAC,,, | Performed by: INTERNAL MEDICINE

## 2023-05-18 PROCEDURE — 99214 PR OFFICE/OUTPT VISIT, EST, LEVL IV, 30-39 MIN: ICD-10-PCS | Mod: ,,, | Performed by: INTERNAL MEDICINE

## 2023-05-18 PROCEDURE — 1159F PR MEDICATION LIST DOCUMENTED IN MEDICAL RECORD: ICD-10-PCS | Mod: CPTII,,, | Performed by: INTERNAL MEDICINE

## 2023-05-18 PROCEDURE — 1157F PR ADVANCE CARE PLAN OR EQUIV PRESENT IN MEDICAL RECORD: ICD-10-PCS | Mod: CPTII,,, | Performed by: INTERNAL MEDICINE

## 2023-05-18 PROCEDURE — 1157F ADVNC CARE PLAN IN RCRD: CPT | Mod: CPTII,,, | Performed by: INTERNAL MEDICINE

## 2023-05-18 PROCEDURE — 3072F LOW RISK FOR RETINOPATHY: CPT | Mod: CPTII,,, | Performed by: INTERNAL MEDICINE

## 2023-05-18 PROCEDURE — 99215 OFFICE O/P EST HI 40 MIN: CPT | Mod: PO | Performed by: INTERNAL MEDICINE

## 2023-05-18 PROCEDURE — 93010 ELECTROCARDIOGRAM REPORT: CPT | Mod: ,,, | Performed by: INTERNAL MEDICINE

## 2023-05-18 PROCEDURE — 3074F SYST BP LT 130 MM HG: CPT | Mod: CPTII,,, | Performed by: INTERNAL MEDICINE

## 2023-05-18 PROCEDURE — 3288F PR FALLS RISK ASSESSMENT DOCUMENTED: ICD-10-PCS | Mod: CPTII,,, | Performed by: INTERNAL MEDICINE

## 2023-05-18 PROCEDURE — 3079F DIAST BP 80-89 MM HG: CPT | Mod: CPTII,,, | Performed by: INTERNAL MEDICINE

## 2023-05-18 PROCEDURE — 1101F PR PT FALLS ASSESS DOC 0-1 FALLS W/OUT INJ PAST YR: ICD-10-PCS | Mod: CPTII,,, | Performed by: INTERNAL MEDICINE

## 2023-05-18 PROCEDURE — 1160F PR REVIEW ALL MEDS BY PRESCRIBER/CLIN PHARMACIST DOCUMENTED: ICD-10-PCS | Mod: CPTII,,, | Performed by: INTERNAL MEDICINE

## 2023-05-18 PROCEDURE — 3288F FALL RISK ASSESSMENT DOCD: CPT | Mod: CPTII,,, | Performed by: INTERNAL MEDICINE

## 2023-05-18 PROCEDURE — 3074F PR MOST RECENT SYSTOLIC BLOOD PRESSURE < 130 MM HG: ICD-10-PCS | Mod: CPTII,,, | Performed by: INTERNAL MEDICINE

## 2023-05-18 PROCEDURE — 3072F PR LOW RISK FOR RETINOPATHY: ICD-10-PCS | Mod: CPTII,,, | Performed by: INTERNAL MEDICINE

## 2023-05-18 PROCEDURE — 1159F MED LIST DOCD IN RCRD: CPT | Mod: CPTII,,, | Performed by: INTERNAL MEDICINE

## 2023-05-18 PROCEDURE — 1126F PR PAIN SEVERITY QUANTIFIED, NO PAIN PRESENT: ICD-10-PCS | Mod: CPTII,,, | Performed by: INTERNAL MEDICINE

## 2023-05-18 NOTE — PROGRESS NOTES
Subjective:   Patient ID:  Erendira Pretty is a 77 y.o. male who presents for follow-up of near syncope (Pt states he drank cold water and he felt it going down and then he had near syncope. Then he states the next time was when he eating a sandwich he had another episode)  Pt with near syncope after drinking cold water and separate episode after eating sandwich.  Patient denies CP, angina or anginal equivalent.   BP fine at home  Echo and NMT/stress normal 2021  Hypertension  This is a chronic problem. The current episode started more than 1 year ago. The problem has been gradually improving since onset. The problem is controlled. Pertinent negatives include no chest pain, palpitations or shortness of breath. Past treatments include ACE inhibitors, beta blockers and calcium channel blockers. The current treatment provides moderate improvement. There are no compliance problems.  Hypertensive end-organ damage includes CAD/MI.   Coronary Artery Disease  Presents for follow-up visit. Pertinent negatives include no chest pain, chest pressure, chest tightness, dizziness, leg swelling, muscle weakness, palpitations, shortness of breath or weight gain. The symptoms have been stable. Compliance with diet is variable. Compliance with exercise is variable. Compliance with medications is good.     Review of Systems   Constitutional: Negative. Negative for weight gain.   HENT: Negative.     Eyes: Negative.    Cardiovascular: Negative.  Negative for chest pain, leg swelling and palpitations.   Respiratory: Negative.  Negative for chest tightness and shortness of breath.    Endocrine: Negative.    Hematologic/Lymphatic: Negative.    Skin: Negative.    Musculoskeletal:  Negative for muscle weakness.   Gastrointestinal: Negative.    Genitourinary: Negative.    Neurological: Negative.  Negative for dizziness.   Psychiatric/Behavioral: Negative.     Allergic/Immunologic: Negative.    All other systems reviewed and are  negative.  Family History   Problem Relation Age of Onset    Heart disease Mother     Heart disease Father     Heart disease Brother     Colon cancer Neg Hx      Past Medical History:   Diagnosis Date    Aortic stenosis     Asthma     Benign prostatic hyperplasia with nocturia     Bilateral carotid artery stenosis 2021    US CAROTID BILATERAL 2021 IMPRESSION: Atherosclerosis with suspected stenosis greater than 50% at the right proximal ICA visually. Velocities are discordant and appear improved from prior. Atherosclerosis on the left with no evidence of flow-limiting stenosis greater than 50%.  FINDINGS: Right: Internal Carotid Artery (ICA): Peak systolic velocity 118 cm/sec. End diastolic velocity 35 cm/sec    BPH (benign prostatic hyperplasia)     CAD (coronary artery disease)     40% lesion ;lazo    Cirrhosis     Claudication 2014    Colon polyp     COPD (chronic obstructive pulmonary disease)     ED (erectile dysfunction)     Encounter for blood transfusion     Ex-smoker     Hearing loss NEC     Hepatitis C     Cured;dr gibbs harvoni     Hyperlipidemia     Hypertension     Hypothyroid     s/p tx graves    Open angle with borderline findings and low glaucoma risk in both eyes 2020    DELONTE on CPAP     Osteoarthritis     PVD (peripheral vascular disease)     Secondary esophageal varices without bleeding 2017    Type 2 diabetes mellitus      Social History     Socioeconomic History    Marital status:      Spouse name: YAEL    Number of children: 2   Tobacco Use    Smoking status: Former     Packs/day: 0.50     Years: 4.00     Pack years: 2.00     Types: Cigarettes     Quit date: 1972     Years since quittin.9    Smokeless tobacco: Former     Types: Chew     Quit date: 1976   Substance and Sexual Activity    Alcohol use: Yes     Alcohol/week: 2.0 standard drinks     Types: 2 Cans of beer per week    Drug use: No    Sexual activity: Yes     Partners: Female    Social History Narrative    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    No pets or smokers in household, lives alone.     Current Outpatient Medications on File Prior to Visit   Medication Sig Dispense Refill    ACCU-CHEK GRACE PLUS METER Misc AS DIRECTED 1 each 0    albuterol (VENTOLIN HFA) 90 mcg/actuation inhaler Inhale 2 puffs into the lungs every 6 (six) hours as needed for Wheezing or Shortness of Breath. Rescue 18 g 3    amLODIPine (NORVASC) 10 MG tablet TAKE 1 TABLET BY MOUTH ONCE A DAY (DOSE INCREASE) 30 tablet 11    aspirin (ECOTRIN) 81 MG EC tablet TAKE 1 TABLET BY MOUTH EVERY DAY 90 tablet 4    azelastine (ASTELIN) 137 mcg (0.1 %) nasal spray 2 sprays (274 mcg total) by Nasal route 2 (two) times daily. 30 mL 6    blood sugar diagnostic (ACCU-CHEK GRACE PLUS TEST STRP) Strp TEST THREE TIMES A  strip 11    budesonide-formoterol 160-4.5 mcg (SYMBICORT) 160-4.5 mcg/actuation HFAA INHALE 2 PUFFS INTO THE LUNGS TWO TIMES A DAY. 10.2 g 11    carvediloL (COREG) 6.25 MG tablet TAKE 1 TABLET BY MOUTH TWICE DAILY WITH MEALS 60 tablet 5    empagliflozin (JARDIANCE) 25 mg tablet Take 1 tablet (25 mg total) by mouth once daily. 90 tablet 3    evolocumab (REPATHA SYRINGE) 140 mg/mL Syrg Inject 140 mg into the skin every 14 (fourteen) days. 2 each 11    famotidine (PEPCID) 40 MG tablet Take 1 tablet (40 mg total) by mouth every evening. 30 tablet 11    finasteride (PROSCAR) 5 mg tablet Take 1 tablet (5 mg total) by mouth once daily. 90 tablet 3    fluticasone propionate (FLONASE) 50 mcg/actuation nasal spray 2 sprays (100 mcg total) by Each Nostril route once daily. 18.2 mL 6    furosemide (LASIX) 40 MG tablet Take 1 tablet (40 mg total) by mouth 2 (two) times daily. 60 tablet 11    GLUCOSAMINE HCL/CHONDR ROSARIO A NA (OSTEO BI-FLEX ORAL) Take 1 tablet by mouth 2 (two) times daily.       ipratropium (ATROVENT) 21 mcg (0.03 %) nasal spray 2 sprays by Each  "Nostril route 2 (two) times daily. 30 mL 0    krill oil 500 mg Cap Take by mouth.      lancets (ACCU-CHEK FASTCLIX LANCET DRUM) Misc TEST TWO TIMES A  each 5    LANTUS SOLOSTAR U-100 INSULIN glargine 100 units/mL SubQ pen INJECT 44 UNITS UNDER THE SKIN EVERY EVENING 45 mL 1    levocetirizine (XYZAL) 5 MG tablet Take 1 tablet (5 mg total) by mouth every evening. 30 tablet 11    levothyroxine (SYNTHROID) 200 MCG tablet Take 1 tablet (200 mcg total) by mouth every morning. 90 tablet 3    lisinopriL 10 MG tablet TAKE 1 BY MOUTH EVERY DAY 30 tablet 7    meloxicam (MOBIC) 15 MG tablet Take 15 mg by mouth.      metFORMIN (GLUCOPHAGE) 500 MG tablet TAKE 1 TABLET BY MOUTH TWICE DAILY WITH MEALS 180 tablet 3    montelukast (SINGULAIR) 10 mg tablet TAKE 1 TABLET BY MOUTH ONCE DAILY 30 tablet 11    neomycin-polymyxin-dexamethasone (DEXACINE) 3.5 mg/g-10,000 unit/g-0.1 % Oint Place into both eyes 2 (two) times a day. Apply to lid margins 3.5 g 0    pen needle, diabetic (BD ULTRA-FINE MINI PEN NEEDLE) 31 gauge x 3/16" Ndle USE AS DIRECTED 100 each 11    RABEprazole (ACIPHEX) 20 mg tablet Take 1 tablet (20 mg total) by mouth 2 (two) times daily. 60 tablet 11    sildenafiL (VIAGRA) 100 MG tablet Take 1/2 to 1 tablet by mouth one hour prior to intercourse. Max 100mg per day 30 tablet 1    sulfamethoxazole-trimethoprim 800-160mg (BACTRIM DS) 800-160 mg Tab Take 1 tablet by mouth 2 (two) times daily. 10 tablet 0     Current Facility-Administered Medications on File Prior to Visit   Medication Dose Route Frequency Provider Last Rate Last Admin    lactated ringers infusion   Intravenous Continuous Omaira Theodore MD   New Bag at 09/09/19 1117     Review of patient's allergies indicates:   Allergen Reactions    Lipitor [atorvastatin] Other (See Comments)     Muscle aches and pains as well as fatigue.     Niacin preparations Other (See Comments)     Causes broken blood vessels and bruises at 4 times normal dose.    " Statins-hmg-coa reductase inhibitors Other (See Comments)     Statins cause intolerable myalgias.    Iodinated contrast media Hives     Tachycardia    Omeprazole Hives and Itching    Prilosec [omeprazole magnesium] Hives and Itching       Objective:     Physical Exam  Vitals and nursing note reviewed.   Constitutional:       Appearance: He is well-developed.   HENT:      Head: Normocephalic and atraumatic.   Eyes:      Conjunctiva/sclera: Conjunctivae normal.      Pupils: Pupils are equal, round, and reactive to light.   Cardiovascular:      Rate and Rhythm: Normal rate and regular rhythm.      Pulses: Intact distal pulses.      Heart sounds: Normal heart sounds.   Pulmonary:      Effort: Pulmonary effort is normal.      Breath sounds: Normal breath sounds.   Abdominal:      General: Bowel sounds are normal.      Palpations: Abdomen is soft.   Musculoskeletal:      Cervical back: Normal range of motion and neck supple.   Skin:     General: Skin is warm and dry.   Neurological:      Mental Status: He is alert and oriented to person, place, and time.       Assessment:     1. PVC (premature ventricular contraction)    2. PVD (peripheral vascular disease)    3. Mixed hyperlipidemia    4. S/P CABG x 2    5. Aortic atherosclerosis    6. Paroxysmal atrial fibrillation    7. Essential hypertension    8. Coronary artery disease involving native coronary artery of native heart without angina pectoris    9. BMI 36.0-36.9,adult    10. Statin myopathy    11. DELONTE on CPAP    12. Dizziness    Possible vasovagal    Plan:     PVC (premature ventricular contraction)    PVD (peripheral vascular disease)    Mixed hyperlipidemia    S/P CABG x 2    Aortic atherosclerosis    Paroxysmal atrial fibrillation    Essential hypertension    Coronary artery disease involving native coronary artery of native heart without angina pectoris    BMI 36.0-36.9,adult    Statin myopathy    DELONTE on CPAP    Dizziness      continue coreg 1/2 tab bid-  HTN  Continue lisinopril , norvasc - HTN  continue repatha - HLP     holter

## 2023-05-23 ENCOUNTER — HOSPITAL ENCOUNTER (OUTPATIENT)
Dept: RADIOLOGY | Facility: HOSPITAL | Age: 78
Discharge: HOME | End: 2023-05-23
Attending: NURSE PRACTITIONER
Payer: MEDICARE

## 2023-05-23 DIAGNOSIS — R07.2 SUBSTERNAL CHEST PAIN: ICD-10-CM

## 2023-05-23 DIAGNOSIS — R13.19 ESOPHAGEAL DYSPHAGIA: ICD-10-CM

## 2023-05-23 PROCEDURE — 25500020 PHARM REV CODE 255: Performed by: NURSE PRACTITIONER

## 2023-05-23 PROCEDURE — 74220 X-RAY XM ESOPHAGUS 1CNTRST: CPT | Mod: 26,,, | Performed by: RADIOLOGY

## 2023-05-23 PROCEDURE — A9698 NON-RAD CONTRAST MATERIALNOC: HCPCS | Performed by: NURSE PRACTITIONER

## 2023-05-23 PROCEDURE — 74220 FL ESOPHAGRAM COMPLETE: ICD-10-PCS | Mod: 26,,, | Performed by: RADIOLOGY

## 2023-05-23 PROCEDURE — 74220 X-RAY XM ESOPHAGUS 1CNTRST: CPT | Mod: TC

## 2023-05-23 RX ADMIN — BARIUM SULFATE 176 G: 960 POWDER, FOR SUSPENSION ORAL at 11:05

## 2023-05-31 ENCOUNTER — PATIENT MESSAGE (OUTPATIENT)
Dept: GASTROENTEROLOGY | Facility: CLINIC | Age: 78
End: 2023-05-31
Payer: MEDICARE

## 2023-06-03 ENCOUNTER — OFFICE VISIT (OUTPATIENT)
Dept: URGENT CARE | Facility: CLINIC | Age: 78
End: 2023-06-03
Payer: MEDICARE

## 2023-06-03 VITALS
RESPIRATION RATE: 18 BRPM | TEMPERATURE: 100 F | DIASTOLIC BLOOD PRESSURE: 68 MMHG | OXYGEN SATURATION: 96 % | BODY MASS INDEX: 36.57 KG/M2 | SYSTOLIC BLOOD PRESSURE: 146 MMHG | WEIGHT: 233 LBS | HEIGHT: 67 IN | HEART RATE: 90 BPM

## 2023-06-03 DIAGNOSIS — R50.9 FEVER, UNSPECIFIED FEVER CAUSE: ICD-10-CM

## 2023-06-03 DIAGNOSIS — B34.9 VIRAL INFECTION: Primary | ICD-10-CM

## 2023-06-03 DIAGNOSIS — R52 GENERALIZED BODY ACHES: ICD-10-CM

## 2023-06-03 LAB
CTP QC/QA: YES
CTP QC/QA: YES
POC MOLECULAR INFLUENZA A AGN: NEGATIVE
POC MOLECULAR INFLUENZA B AGN: NEGATIVE
SARS-COV-2 AG RESP QL IA.RAPID: NEGATIVE

## 2023-06-03 PROCEDURE — 87502 INFLUENZA DNA AMP PROBE: CPT | Mod: QW,S$GLB,, | Performed by: NURSE PRACTITIONER

## 2023-06-03 PROCEDURE — 87811 SARS CORONAVIRUS 2 ANTIGEN POCT, MANUAL READ: ICD-10-PCS | Mod: QW,S$GLB,, | Performed by: NURSE PRACTITIONER

## 2023-06-03 PROCEDURE — 87811 SARS-COV-2 COVID19 W/OPTIC: CPT | Mod: QW,S$GLB,, | Performed by: NURSE PRACTITIONER

## 2023-06-03 PROCEDURE — 87502 POCT INFLUENZA A/B MOLECULAR: ICD-10-PCS | Mod: QW,S$GLB,, | Performed by: NURSE PRACTITIONER

## 2023-06-03 PROCEDURE — 99213 PR OFFICE/OUTPT VISIT, EST, LEVL III, 20-29 MIN: ICD-10-PCS | Mod: S$GLB,,, | Performed by: NURSE PRACTITIONER

## 2023-06-03 PROCEDURE — 99213 OFFICE O/P EST LOW 20 MIN: CPT | Mod: S$GLB,,, | Performed by: NURSE PRACTITIONER

## 2023-06-03 NOTE — PROGRESS NOTES
"Subjective:      Patient ID: Erendira Pretty is a 77 y.o. male.    Vitals:  height is 5' 7" (1.702 m) and weight is 105.7 kg (233 lb). His tympanic temperature is 99.9 °F (37.7 °C). His blood pressure is 146/68 (abnormal) and his pulse is 90. His respiration is 18 and oxygen saturation is 96%.     Chief Complaint: Headache    Pt complaining of headache, chills, and fever since yesterday.     Headache   This is a new problem. The current episode started yesterday. The problem occurs intermittently. The problem has been gradually worsening. The pain is located in the Bilateral region. The pain does not radiate. The pain quality is similar to prior headaches. The quality of the pain is described as aching. The pain is at a severity of 5/10. Associated symptoms include coughing, a fever, muscle aches and tinnitus. Pertinent negatives include no abdominal pain, abnormal behavior, anorexia, back pain, blurred vision, dizziness, drainage, ear pain, eye pain, eye redness, eye watering, facial sweating, hearing loss, insomnia, loss of balance, nausea, neck pain, numbness, phonophobia, photophobia, rhinorrhea, scalp tenderness, seizures, sinus pressure, sore throat, swollen glands, tingling, visual change, vomiting, weakness or weight loss. The symptoms are aggravated by unknown. He has tried acetaminophen for the symptoms. The treatment provided significant relief. His past medical history is significant for hypertension and obesity. There is no history of cancer, cluster headaches, immunosuppression, migraine headaches, migraines in the family, pseudotumor cerebri, recent head traumas, sinus disease or TMJ.     Constitution: Positive for appetite change, chills, sweating, fatigue and fever.   HENT:  Positive for tinnitus and congestion. Negative for ear pain, hearing loss, sinus pressure and sore throat.    Neck: Negative for neck pain.   Cardiovascular:  Negative for chest pain and sob on exertion.   Eyes:  Negative for eye " pain, eye redness, photophobia and blurred vision.   Respiratory:  Positive for cough. Negative for shortness of breath.    Gastrointestinal:  Negative for abdominal pain, nausea, vomiting and diarrhea.   Genitourinary:  Negative for frequency and urgency.   Musculoskeletal:  Negative for back pain.   Neurological:  Positive for headaches. Negative for dizziness, loss of balance, history of migraines, numbness and seizures.   Psychiatric/Behavioral:  The patient does not have insomnia.     Objective:     Physical Exam   Constitutional: He appears well-developed. He is cooperative.  Non-toxic appearance. He does not appear ill. No distress. obesityawake  HENT:   Head: Normocephalic and atraumatic.   Ears:   Right Ear: External ear normal.   Left Ear: External ear normal.   Nose: Nose normal.   Eyes: Conjunctivae and EOM are normal.   Neck: Neck supple.   Cardiovascular: Normal rate.   Pulmonary/Chest: Effort normal and breath sounds normal. No stridor. No respiratory distress. He has no wheezes. He has no rhonchi. He has no rales.   Abdominal: Normal appearance.   Musculoskeletal: Normal range of motion.         General: Normal range of motion.   Neurological: no focal deficit. He is alert. He displays no weakness. Gait normal.   Skin: Skin is warm, dry, not diaphoretic, not pale and no rash.   Psychiatric: He experiences Normal attention. His speech is normal and behavior is normal. Mood normal.   Nursing note and vitals reviewed.    Assessment:     1. Viral infection    2. Generalized body aches    3. Fever, unspecified fever cause        Plan:       Viral infection    Generalized body aches    Fever, unspecified fever cause  -     SARS Coronavirus 2 Antigen, POCT Manual Read  -     POCT Influenza A/B MOLECULAR    +/- Influenza    Discussed results with patient and possible quarantine based on CDC guidelines.  Discussed use of OTC medications for symptom control as this may be a viral illness  Discussed with  patient that they may be testing too early being that symptoms just started yesterday  Discussed OOB as much as possible, Tylenol/Motrin for fever, pain, and body aches, and to increase hydration  All ER precautions covered including but not limited to shortness of breath, difficulty breathing, intractable fever, or chest pain.  Discussed RTC or follow up with PCP if symptoms worsen, change, or persist.  Patient verbalized understanding and agreed with the plan of care.         Patient Instructions   You have tested NEGATIVE for COVID-19 today.     CDC Testing and Quarantine Guidelines for patients with exposure to a known-positive COVID-19 person:    ·     A 'close exposure' is defined as anyone who has had an exposure (masked or unmasked) to a known COVID -19 positive person within 6 feet of someone for a cumulative total of 15 minutes or more over a 24-hour period.    ·     Vaccinated: patients who have been boosted or completed the primary series of Pfizer or Moderna vaccine within the last 6 months or completed the primary series of J&J vaccine within the last 2 months and/or had a positive test within 90 days   - do NOT need to quarantine after contact with someone who had COVID-19 unless they have symptoms.   - should get tested 3-5 days after their exposure (if they have not had a positive test within the last 90 days), even if they don't have symptoms and wear a mask indoors in public for 10 days following exposure or until their test result is negative on day 5.  - If you develop symptoms test and quarantine.    ·     Unvaccinated, or are more than six months out from their second mRNA dose (or more than 2 months after the J&J vaccine) and not yet boosted,  and/or NOT had a positive test within 90 days and meet 'close exposure'  - you are required by CDC guidelines to quarantine for at least 5 days from time of exposure followed by 5 days of strict masking. It is recommended, but not required to test after  5 days, unless you develop symptoms, in which case you should test at that time.  - If you do decide to test at 5 days and are asymptomatic, the risk is that if you test without symptoms (on Day 5 for example) and you are positive, your 5 day isolation begins on that day, and you turned your 5 day quarantine into 10 days.  - If your exposure does not meet the above definition, you can return to your normal daily activities to include social distancing, wearing a mask and frequent handwashing.    Alternatively, if a 5-day quarantine is not feasible, it is imperative that an exposed person wear a well-fitting mask at all times when around others for 10 days after exposure.    During quarantine:   Separate yourself from other people and animals in your home.  Call ahead before visiting your doctor.  Wear a facemask.  Cover your coughs and sneezes.  Wash your hands often with soap and water; hand  can be used, too.  Avoid sharing personal household items.  Wipe down surfaces used daily.  Monitor your symptoms. Seek prompt medical attention if your illness is worsening (e.g., difficulty breathing).   Before seeking care, call your healthcare provider.  If you have a medical emergency and need to call 911, notify the dispatch personnel that you have, or are being evaluated for COVID-19. If possible, put on a facemask before emergency medical services arrive.                               Close contacts should also follow these recommendations:  Make sure that you understand and can help the patient follow their provider's instructions for medication(s) and care. You should help the patient with basic needs in the home and provide support for getting groceries, prescriptions, and other personal needs.  Monitor the patient's symptoms. If the patient is getting sicker, call his or her healthcare provider and tell them that the patient has laboratory-confirmed COVID-19. If the patient has a medical emergency and you  need to call 911, notify the dispatch personnel that the patient has, or is being evaluated for COVID-19.  Household members should stay in another room or be  from the patient. Household members should use a separate bedroom and bathroom, if available.  Prohibit visitors.  Household members should care for any pets in the home.  Make sure that shared spaces in the home have good air flow, such as by an air conditioner or an opened window, weather permitting.  Perform hand hygiene frequently. Wash your hands often with soap and water for at least 20 seconds or use an alcohol-based hand  (that contains > 60% alcohol) covering all surfaces of your hands and rubbing them together until they feel dry. Soap and water should be used preferentially.  Avoid touching your eyes, nose, and mouth.  The patient should wear a facemask. If the patient is not able to wear a facemask (for example, because it causes trouble breathing), caregivers should wear a mask when they are in the same room as the patient.  Wear a disposable facemask and gloves when you touch or have contact with the patient's blood, stool, or body fluids, such as saliva, sputum, nasal mucus, vomit, urine.  Throw out disposable facemasks and gloves after using them. Do not reuse.  When removing personal protective equipment, first remove and dispose of gloves. Then, immediately clean your hands with soap and water or alcohol-based hand . Next, remove and dispose of facemask, and immediately clean your hands again with soap and water or alcohol-based hand .  You should not share dishes, drinking glasses, cups, eating utensils, towels, bedding, or other items with the patient. After the patient uses these items, you should wash them thoroughly (see below Wash laundry thoroughly).  Clean all high-touch surfaces, such as counters, tabletops, doorknobs, bathroom fixtures, toilets, phones, keyboards, tablets, and bedside tables,  every day. Also, clean any surfaces that may have blood, stool, or body fluids on them.  Use a household cleaning spray or wipe, according to the label instructions. Labels contain instructions for safe and effective use of the cleaning product including precautions you should take when applying the product, such as wearing gloves and making sure you have good ventilation during use of the product.  Wash laundry thoroughly.  Immediately remove and wash clothes or bedding that have blood, stool, or body fluids on them.  Wear disposable gloves while handling soiled items and keep soiled items away from your body. Clean your hands (with soap and water or an alcohol-based hand ) immediately after removing your gloves.  Read and follow directions on labels of laundry or clothing items and detergent. In general, using a normal laundry detergent according to washing machine instructions and dry thoroughly using the warmest temperatures recommended on the clothing label.  Place all used disposable gloves, facemasks, and other contaminated items in a lined container before disposing of them with other household waste. Clean your hands (with soap and water or an alcohol-based hand ) immediately after handling these items. Soap and water should be used preferentially if hands are visibly dirty.  Discuss any additional questions with your state or local health department or healthcare provider. Check available hours when contacting your local health department.     Follow up with your PCP or specialty clinic as directed within 2-5 days if not improved or as needed.  You can call [phone] to schedule an appointment with the appropriate provider.  If your condition worsens we recommend that you receive another evaluation at the emergency room immediately or contact your primary medical clinics after hours call service to discuss your concerns.  Please return here or go to the Emergency Department for any  concerns or worsening of condition.

## 2023-06-03 NOTE — PATIENT INSTRUCTIONS
You have tested NEGATIVE for COVID-19 today.     CDC Testing and Quarantine Guidelines for patients with exposure to a known-positive COVID-19 person:    ·     A 'close exposure' is defined as anyone who has had an exposure (masked or unmasked) to a known COVID -19 positive person within 6 feet of someone for a cumulative total of 15 minutes or more over a 24-hour period.    ·     Vaccinated: patients who have been boosted or completed the primary series of Pfizer or Moderna vaccine within the last 6 months or completed the primary series of J&J vaccine within the last 2 months and/or had a positive test within 90 days   - do NOT need to quarantine after contact with someone who had COVID-19 unless they have symptoms.   - should get tested 3-5 days after their exposure (if they have not had a positive test within the last 90 days), even if they don't have symptoms and wear a mask indoors in public for 10 days following exposure or until their test result is negative on day 5.  - If you develop symptoms test and quarantine.    ·     Unvaccinated, or are more than six months out from their second mRNA dose (or more than 2 months after the J&J vaccine) and not yet boosted,  and/or NOT had a positive test within 90 days and meet 'close exposure'  - you are required by CDC guidelines to quarantine for at least 5 days from time of exposure followed by 5 days of strict masking. It is recommended, but not required to test after 5 days, unless you develop symptoms, in which case you should test at that time.  - If you do decide to test at 5 days and are asymptomatic, the risk is that if you test without symptoms (on Day 5 for example) and you are positive, your 5 day isolation begins on that day, and you turned your 5 day quarantine into 10 days.  - If your exposure does not meet the above definition, you can return to your normal daily activities to include social distancing, wearing a mask and frequent  handwashing.    Alternatively, if a 5-day quarantine is not feasible, it is imperative that an exposed person wear a well-fitting mask at all times when around others for 10 days after exposure.    During quarantine:   Separate yourself from other people and animals in your home.  Call ahead before visiting your doctor.  Wear a facemask.  Cover your coughs and sneezes.  Wash your hands often with soap and water; hand  can be used, too.  Avoid sharing personal household items.  Wipe down surfaces used daily.  Monitor your symptoms. Seek prompt medical attention if your illness is worsening (e.g., difficulty breathing).   Before seeking care, call your healthcare provider.  If you have a medical emergency and need to call 911, notify the dispatch personnel that you have, or are being evaluated for COVID-19. If possible, put on a facemask before emergency medical services arrive.                               Close contacts should also follow these recommendations:  Make sure that you understand and can help the patient follow their provider's instructions for medication(s) and care. You should help the patient with basic needs in the home and provide support for getting groceries, prescriptions, and other personal needs.  Monitor the patient's symptoms. If the patient is getting sicker, call his or her healthcare provider and tell them that the patient has laboratory-confirmed COVID-19. If the patient has a medical emergency and you need to call 911, notify the dispatch personnel that the patient has, or is being evaluated for COVID-19.  Household members should stay in another room or be  from the patient. Household members should use a separate bedroom and bathroom, if available.  Prohibit visitors.  Household members should care for any pets in the home.  Make sure that shared spaces in the home have good air flow, such as by an air conditioner or an opened window, weather permitting.  Perform  hand hygiene frequently. Wash your hands often with soap and water for at least 20 seconds or use an alcohol-based hand  (that contains > 60% alcohol) covering all surfaces of your hands and rubbing them together until they feel dry. Soap and water should be used preferentially.  Avoid touching your eyes, nose, and mouth.  The patient should wear a facemask. If the patient is not able to wear a facemask (for example, because it causes trouble breathing), caregivers should wear a mask when they are in the same room as the patient.  Wear a disposable facemask and gloves when you touch or have contact with the patient's blood, stool, or body fluids, such as saliva, sputum, nasal mucus, vomit, urine.  Throw out disposable facemasks and gloves after using them. Do not reuse.  When removing personal protective equipment, first remove and dispose of gloves. Then, immediately clean your hands with soap and water or alcohol-based hand . Next, remove and dispose of facemask, and immediately clean your hands again with soap and water or alcohol-based hand .  You should not share dishes, drinking glasses, cups, eating utensils, towels, bedding, or other items with the patient. After the patient uses these items, you should wash them thoroughly (see below Wash laundry thoroughly).  Clean all high-touch surfaces, such as counters, tabletops, doorknobs, bathroom fixtures, toilets, phones, keyboards, tablets, and bedside tables, every day. Also, clean any surfaces that may have blood, stool, or body fluids on them.  Use a household cleaning spray or wipe, according to the label instructions. Labels contain instructions for safe and effective use of the cleaning product including precautions you should take when applying the product, such as wearing gloves and making sure you have good ventilation during use of the product.  Wash laundry thoroughly.  Immediately remove and wash clothes or bedding that  have blood, stool, or body fluids on them.  Wear disposable gloves while handling soiled items and keep soiled items away from your body. Clean your hands (with soap and water or an alcohol-based hand ) immediately after removing your gloves.  Read and follow directions on labels of laundry or clothing items and detergent. In general, using a normal laundry detergent according to washing machine instructions and dry thoroughly using the warmest temperatures recommended on the clothing label.  Place all used disposable gloves, facemasks, and other contaminated items in a lined container before disposing of them with other household waste. Clean your hands (with soap and water or an alcohol-based hand ) immediately after handling these items. Soap and water should be used preferentially if hands are visibly dirty.  Discuss any additional questions with your state or local health department or healthcare provider. Check available hours when contacting your local health department.     Follow up with your PCP or specialty clinic as directed within 2-5 days if not improved or as needed.  You can call [phone] to schedule an appointment with the appropriate provider.  If your condition worsens we recommend that you receive another evaluation at the emergency room immediately or contact your primary medical clinics after hours call service to discuss your concerns.  Please return here or go to the Emergency Department for any concerns or worsening of condition.

## 2023-06-05 ENCOUNTER — HOSPITAL ENCOUNTER (OUTPATIENT)
Dept: CARDIOLOGY | Facility: HOSPITAL | Age: 78
Discharge: HOME | End: 2023-06-05
Attending: INTERNAL MEDICINE
Payer: MEDICARE

## 2023-06-05 DIAGNOSIS — I48.0 PAROXYSMAL ATRIAL FIBRILLATION: ICD-10-CM

## 2023-06-05 DIAGNOSIS — R42 DIZZINESS: ICD-10-CM

## 2023-06-05 PROCEDURE — 93225 XTRNL ECG REC<48 HRS REC: CPT | Mod: PO

## 2023-06-05 PROCEDURE — 93227 XTRNL ECG REC<48 HR R&I: CPT | Mod: ,,, | Performed by: INTERNAL MEDICINE

## 2023-06-05 PROCEDURE — 93227 HOLTER MONITOR - 24 HOUR (CUPID ONLY): ICD-10-PCS | Mod: ,,, | Performed by: INTERNAL MEDICINE

## 2023-06-06 ENCOUNTER — TELEPHONE (OUTPATIENT)
Dept: URGENT CARE | Facility: CLINIC | Age: 78
End: 2023-06-06
Payer: MEDICARE

## 2023-06-07 LAB
OHS CV EVENT MONITOR DAY: 0
OHS CV HOLTER LENGTH DECIMAL HOURS: 23.62
OHS CV HOLTER LENGTH HOURS: 23
OHS CV HOLTER LENGTH MINUTES: 37
OHS CV HOLTER SINUS AVERAGE HR: 72
OHS CV HOLTER SINUS MAX HR: 101
OHS CV HOLTER SINUS MIN HR: 43

## 2023-06-12 ENCOUNTER — TELEPHONE (OUTPATIENT)
Dept: CARDIOLOGY | Facility: CLINIC | Age: 78
End: 2023-06-12
Payer: MEDICARE

## 2023-06-12 NOTE — TELEPHONE ENCOUNTER
----- Message from Frank Gallardo MD sent at 6/11/2023  1:59 PM CDT -----  Holter shows afib frequent PVCs  Start eliquis

## 2023-06-12 NOTE — TELEPHONE ENCOUNTER
Followed up with Erendira, patient has been notified of this information and all questions answered. Per patient's provider:  Holter shows afib frequent PVCs  Start eliquis.  Patient verbalized understanding of medication dosage and instructions.

## 2023-06-14 ENCOUNTER — LAB VISIT (OUTPATIENT)
Dept: LAB | Facility: HOSPITAL | Age: 78
End: 2023-06-14
Attending: PEDIATRICS
Payer: MEDICARE

## 2023-06-14 DIAGNOSIS — E03.9 ACQUIRED HYPOTHYROIDISM: Chronic | ICD-10-CM

## 2023-06-14 LAB
T4 FREE SERPL-MCNC: 0.85 NG/DL (ref 0.71–1.51)
TSH SERPL DL<=0.005 MIU/L-ACNC: 10.19 UIU/ML (ref 0.4–4)

## 2023-06-14 PROCEDURE — 36415 COLL VENOUS BLD VENIPUNCTURE: CPT | Mod: PO | Performed by: PEDIATRICS

## 2023-06-14 PROCEDURE — 84439 ASSAY OF FREE THYROXINE: CPT | Performed by: PEDIATRICS

## 2023-06-14 PROCEDURE — 84443 ASSAY THYROID STIM HORMONE: CPT | Performed by: PEDIATRICS

## 2023-06-15 DIAGNOSIS — E03.9 ACQUIRED HYPOTHYROIDISM: Chronic | ICD-10-CM

## 2023-06-15 RX ORDER — LEVOTHYROXINE SODIUM 300 UG/1
300 TABLET ORAL EVERY MORNING
Qty: 90 TABLET | Refills: 3 | Status: SHIPPED | OUTPATIENT
Start: 2023-06-15 | End: 2024-02-09

## 2023-06-21 ENCOUNTER — TELEPHONE (OUTPATIENT)
Dept: GASTROENTEROLOGY | Facility: CLINIC | Age: 78
End: 2023-06-21
Payer: MEDICARE

## 2023-06-21 NOTE — TELEPHONE ENCOUNTER
----- Message from Elba Kruse sent at 6/21/2023 12:37 PM CDT -----  Contact: Patient, [phone]  Patient is returning a phone call.  Who left a message for the patient: [place]  Does patient know what this is regarding:  Esophogram results  Would you like a call back, or a response through your [facility] portal?:   Call back  Comments:  Received your letter, please call him back. Thanks.

## 2023-06-26 ENCOUNTER — TELEPHONE (OUTPATIENT)
Dept: CARDIOLOGY | Facility: CLINIC | Age: 78
End: 2023-06-26
Payer: MEDICARE

## 2023-06-26 NOTE — TELEPHONE ENCOUNTER
Spoke with pt and informed him that Dr. Gallardo advised that he can stop taking the Eliquis. Confirmed f/u appt with Dr. Gallardo on 8/10/23. Pt verbalized understanding and will call back with any further questions or concerns.     ----- Message from Frank Gallardo MD sent at 6/26/2023  2:45 PM CDT -----  Contact: patient  Can stop  ----- Message -----  From: Flakita Mathias LPN  Sent: 6/26/2023  11:46 AM CDT  To: Frank Gallardo MD    Please advise.  ----- Message -----  From: Ulysses Garcia  Sent: 6/26/2023  10:38 AM CDT  To: Sami HAMEED Staff    Erendira KENRICK Pretty would like a call back at [phone], in regards to the Eliquis medication he was prescribed causing him to have muscle pain.

## 2023-07-03 NOTE — TELEPHONE ENCOUNTER
No care due was identified.  [facility] Embedded Care Due Messages. Reference number: [id number].   7/03/2023 4:42:21 PM CDT

## 2023-07-03 NOTE — TELEPHONE ENCOUNTER
Refill Routing Note   Medication(s) are not appropriate for processing by [facility] for the following reason(s):      Drug-disease interaction    [facility] action(s):  Defer Care Due:  None identified   Medication Therapy Plan: Drug-Disease: sildenafiL and HCC (hepatocellular carcinoma); Other cirrhosis of liver    Pharmacist review requested: Yes     Appointments  past 12m or future 3m with PCP    Date Provider   Last Visit   4/14/2023 KAREN Callaway Jr., MD   Next Visit   Visit date not found KAREN Callaway Jr., MD   ED visits in past 90 days: 0        Note composed:5:50 PM 07/03/2023

## 2023-07-03 NOTE — TELEPHONE ENCOUNTER
Refill Routing Note   Medication(s) are not appropriate for processing by [facility] for the following reason(s):      Due for refill >6 months ago: last dispensed 11/7/22 for 30 day supply per Epic data     [facility] action(s):  Defer Care Due:  None identified        Pharmacist review requested: Yes     Appointments  past 12m or future 3m with PCP    Date Provider   Last Visit   4/14/2023 KAREN Callaway Jr., MD   Next Visit   Visit date not found KAREN Callaway Jr., MD   ED visits in past 90 days: 0        Note composed:6:39 PM 07/03/2023

## 2023-07-05 RX ORDER — SILDENAFIL 100 MG/1
TABLET, FILM COATED ORAL
Qty: 30 TABLET | Refills: 11 | Status: SHIPPED | OUTPATIENT
Start: 2023-07-05

## 2023-07-21 DIAGNOSIS — J45.20 MILD INTERMITTENT ASTHMA WITHOUT COMPLICATION: ICD-10-CM

## 2023-07-21 RX ORDER — BUDESONIDE AND FORMOTEROL FUMARATE DIHYDRATE 160; 4.5 UG/1; UG/1
AEROSOL RESPIRATORY (INHALATION)
Qty: 10.2 G | Refills: 11 | Status: SHIPPED | OUTPATIENT
Start: 2023-07-21 | End: 2023-11-10 | Stop reason: SDUPTHER

## 2023-08-01 RX ORDER — FUROSEMIDE 40 MG/1
40 TABLET ORAL 2 TIMES DAILY
Qty: 60 TABLET | Refills: 11 | Status: SHIPPED | OUTPATIENT
Start: 2023-08-01 | End: 2023-10-09 | Stop reason: SDUPTHER

## 2023-08-01 NOTE — TELEPHONE ENCOUNTER
----- Message from Reymundo Gamboa sent at 8/1/2023  7:51 AM CDT -----  Contact: [phone]  Type:  RX Refill Request    Who Called: Kerney   Refill or New Rx:refill   RX Name and Strength:furosemide (LASIX) 40 MG tablet  How is the patient currently taking it? (ex. 1XDay):2 a day   Is this a 30 day or 90 day RX:60  Preferred Pharmacy with phone number:  [place], [place] - [address] [facility] [address]  [address] [address] [address]  Phone: [phone] Fax: [phone]  Local or Mail Order:local   Ordering Provider:Dr Gallardo   Would the patient rather a call back or a response via [facility]? Call back   Best Call Back Number:[phone]   Additional Information:

## 2023-08-03 ENCOUNTER — LAB VISIT (OUTPATIENT)
Dept: LAB | Facility: HOSPITAL | Age: 78
End: 2023-08-03
Attending: INTERNAL MEDICINE
Payer: MEDICARE

## 2023-08-03 DIAGNOSIS — K74.69 OTHER CIRRHOSIS OF LIVER: ICD-10-CM

## 2023-08-03 DIAGNOSIS — C22.0 HCC (HEPATOCELLULAR CARCINOMA): ICD-10-CM

## 2023-08-03 DIAGNOSIS — I70.0 AORTIC ATHEROSCLEROSIS: ICD-10-CM

## 2023-08-03 LAB
AFP SERPL-MCNC: 3.1 NG/ML (ref 0–8.4)
ALBUMIN SERPL BCP-MCNC: 3.5 G/DL (ref 3.5–5.2)
ALP SERPL-CCNC: 88 U/L (ref 55–135)
ALT SERPL W/O P-5'-P-CCNC: 24 U/L (ref 10–44)
ANION GAP SERPL CALC-SCNC: 9 MMOL/L (ref 8–16)
AST SERPL-CCNC: 27 U/L (ref 10–40)
BASOPHILS # BLD AUTO: 0.03 K/UL (ref 0–0.2)
BASOPHILS NFR BLD: 0.7 % (ref 0–1.9)
BILIRUB SERPL-MCNC: 0.4 MG/DL (ref 0.1–1)
BUN SERPL-MCNC: 18 MG/DL (ref 8–23)
CALCIUM SERPL-MCNC: 8.9 MG/DL (ref 8.7–10.5)
CHLORIDE SERPL-SCNC: 102 MMOL/L (ref 95–110)
CO2 SERPL-SCNC: 27 MMOL/L (ref 23–29)
CREAT SERPL-MCNC: 1 MG/DL (ref 0.5–1.4)
DIFFERENTIAL METHOD: ABNORMAL
EOSINOPHIL # BLD AUTO: 0.2 K/UL (ref 0–0.5)
EOSINOPHIL NFR BLD: 5.2 % (ref 0–8)
ERYTHROCYTE [DISTWIDTH] IN BLOOD BY AUTOMATED COUNT: 13.5 % (ref 11.5–14.5)
EST. GFR  (NO RACE VARIABLE): >60 ML/MIN/1.73 M^2
GLUCOSE SERPL-MCNC: 109 MG/DL (ref 70–110)
HCT VFR BLD AUTO: 43 % (ref 40–54)
HGB BLD-MCNC: 14.3 G/DL (ref 14–18)
IMM GRANULOCYTES # BLD AUTO: 0 K/UL (ref 0–0.04)
IMM GRANULOCYTES NFR BLD AUTO: 0 % (ref 0–0.5)
INR PPP: 1.1 (ref 0.8–1.2)
LYMPHOCYTES # BLD AUTO: 1.2 K/UL (ref 1–4.8)
LYMPHOCYTES NFR BLD: 29.1 % (ref 18–48)
MCH RBC QN AUTO: 31.1 PG (ref 27–31)
MCHC RBC AUTO-ENTMCNC: 33.3 G/DL (ref 32–36)
MCV RBC AUTO: 94 FL (ref 82–98)
MONOCYTES # BLD AUTO: 0.6 K/UL (ref 0.3–1)
MONOCYTES NFR BLD: 13.9 % (ref 4–15)
NEUTROPHILS # BLD AUTO: 2.1 K/UL (ref 1.8–7.7)
NEUTROPHILS NFR BLD: 51.1 % (ref 38–73)
NRBC BLD-RTO: 0 /100 WBC
PLATELET # BLD AUTO: 168 K/UL (ref 150–450)
PMV BLD AUTO: 10.2 FL (ref 9.2–12.9)
POTASSIUM SERPL-SCNC: 4 MMOL/L (ref 3.5–5.1)
PROT SERPL-MCNC: 7.5 G/DL (ref 6–8.4)
PROTHROMBIN TIME: 11.1 SEC (ref 9–12.5)
RBC # BLD AUTO: 4.6 M/UL (ref 4.6–6.2)
SODIUM SERPL-SCNC: 138 MMOL/L (ref 136–145)
TRIGL SERPL-MCNC: 168 MG/DL (ref 30–150)
WBC # BLD AUTO: 4.02 K/UL (ref 3.9–12.7)

## 2023-08-03 PROCEDURE — 82105 ALPHA-FETOPROTEIN SERUM: CPT | Performed by: INTERNAL MEDICINE

## 2023-08-03 PROCEDURE — 80053 COMPREHEN METABOLIC PANEL: CPT | Performed by: INTERNAL MEDICINE

## 2023-08-03 PROCEDURE — 80061 LIPID PANEL: CPT | Performed by: INTERNAL MEDICINE

## 2023-08-03 PROCEDURE — 85610 PROTHROMBIN TIME: CPT | Performed by: INTERNAL MEDICINE

## 2023-08-03 PROCEDURE — 36415 COLL VENOUS BLD VENIPUNCTURE: CPT | Mod: PO | Performed by: INTERNAL MEDICINE

## 2023-08-03 PROCEDURE — 85025 COMPLETE CBC W/AUTO DIFF WBC: CPT | Performed by: INTERNAL MEDICINE

## 2023-08-15 ENCOUNTER — LAB VISIT (OUTPATIENT)
Dept: LAB | Facility: HOSPITAL | Age: 78
End: 2023-08-15
Attending: PEDIATRICS
Payer: MEDICARE

## 2023-08-15 DIAGNOSIS — E03.9 ACQUIRED HYPOTHYROIDISM: Chronic | ICD-10-CM

## 2023-08-15 LAB — TSH SERPL DL<=0.005 MIU/L-ACNC: 0.57 UIU/ML (ref 0.4–4)

## 2023-08-15 PROCEDURE — 84443 ASSAY THYROID STIM HORMONE: CPT | Performed by: PEDIATRICS

## 2023-08-15 PROCEDURE — 36415 COLL VENOUS BLD VENIPUNCTURE: CPT | Mod: PO | Performed by: PEDIATRICS

## 2023-08-17 ENCOUNTER — TELEPHONE (OUTPATIENT)
Dept: PHARMACY | Facility: CLINIC | Age: 78
End: 2023-08-17
Payer: MEDICARE

## 2023-08-17 ENCOUNTER — OFFICE VISIT (OUTPATIENT)
Dept: CARDIOLOGY | Facility: CLINIC | Age: 78
End: 2023-08-17
Payer: MEDICARE

## 2023-08-17 VITALS
HEIGHT: 67 IN | BODY MASS INDEX: 36.5 KG/M2 | HEART RATE: 70 BPM | OXYGEN SATURATION: 98 % | SYSTOLIC BLOOD PRESSURE: 112 MMHG | WEIGHT: 232.56 LBS | DIASTOLIC BLOOD PRESSURE: 74 MMHG

## 2023-08-17 DIAGNOSIS — T46.6X5A STATIN MYOPATHY: ICD-10-CM

## 2023-08-17 DIAGNOSIS — I25.10 CORONARY ARTERY DISEASE INVOLVING NATIVE CORONARY ARTERY OF NATIVE HEART WITHOUT ANGINA PECTORIS: Chronic | ICD-10-CM

## 2023-08-17 DIAGNOSIS — I35.0 NONRHEUMATIC AORTIC VALVE STENOSIS: Chronic | ICD-10-CM

## 2023-08-17 DIAGNOSIS — Z95.1 S/P CABG X 2: ICD-10-CM

## 2023-08-17 DIAGNOSIS — G47.33 OSA ON CPAP: Chronic | ICD-10-CM

## 2023-08-17 DIAGNOSIS — E78.5 HYPERLIPIDEMIA, UNSPECIFIED HYPERLIPIDEMIA TYPE: ICD-10-CM

## 2023-08-17 DIAGNOSIS — Z66 DNR (DO NOT RESUSCITATE): ICD-10-CM

## 2023-08-17 DIAGNOSIS — G72.0 STATIN MYOPATHY: ICD-10-CM

## 2023-08-17 DIAGNOSIS — I20.9 ANGINA PECTORIS: ICD-10-CM

## 2023-08-17 DIAGNOSIS — I48.0 PAROXYSMAL ATRIAL FIBRILLATION: ICD-10-CM

## 2023-08-17 DIAGNOSIS — J45.20 MILD INTERMITTENT ASTHMA WITHOUT COMPLICATION: Primary | ICD-10-CM

## 2023-08-17 DIAGNOSIS — I70.0 AORTIC ATHEROSCLEROSIS: ICD-10-CM

## 2023-08-17 DIAGNOSIS — I73.9 PVD (PERIPHERAL VASCULAR DISEASE): ICD-10-CM

## 2023-08-17 DIAGNOSIS — I10 ESSENTIAL HYPERTENSION: Chronic | ICD-10-CM

## 2023-08-17 DIAGNOSIS — I49.3 PVC (PREMATURE VENTRICULAR CONTRACTION): ICD-10-CM

## 2023-08-17 PROCEDURE — 3288F PR FALLS RISK ASSESSMENT DOCUMENTED: ICD-10-PCS | Mod: CPTII,S$GLB,, | Performed by: INTERNAL MEDICINE

## 2023-08-17 PROCEDURE — 1157F ADVNC CARE PLAN IN RCRD: CPT | Mod: CPTII,S$GLB,, | Performed by: INTERNAL MEDICINE

## 2023-08-17 PROCEDURE — 1126F AMNT PAIN NOTED NONE PRSNT: CPT | Mod: CPTII,S$GLB,, | Performed by: INTERNAL MEDICINE

## 2023-08-17 PROCEDURE — 3072F PR LOW RISK FOR RETINOPATHY: ICD-10-PCS | Mod: CPTII,S$GLB,, | Performed by: INTERNAL MEDICINE

## 2023-08-17 PROCEDURE — 3074F PR MOST RECENT SYSTOLIC BLOOD PRESSURE < 130 MM HG: ICD-10-PCS | Mod: CPTII,S$GLB,, | Performed by: INTERNAL MEDICINE

## 2023-08-17 PROCEDURE — 3072F LOW RISK FOR RETINOPATHY: CPT | Mod: CPTII,S$GLB,, | Performed by: INTERNAL MEDICINE

## 2023-08-17 PROCEDURE — 1101F PR PT FALLS ASSESS DOC 0-1 FALLS W/OUT INJ PAST YR: ICD-10-PCS | Mod: CPTII,S$GLB,, | Performed by: INTERNAL MEDICINE

## 2023-08-17 PROCEDURE — 99215 PR OFFICE/OUTPT VISIT, EST, LEVL V, 40-54 MIN: ICD-10-PCS | Mod: S$GLB,,, | Performed by: INTERNAL MEDICINE

## 2023-08-17 PROCEDURE — 3078F PR MOST RECENT DIASTOLIC BLOOD PRESSURE < 80 MM HG: ICD-10-PCS | Mod: CPTII,S$GLB,, | Performed by: INTERNAL MEDICINE

## 2023-08-17 PROCEDURE — 1159F MED LIST DOCD IN RCRD: CPT | Mod: CPTII,S$GLB,, | Performed by: INTERNAL MEDICINE

## 2023-08-17 PROCEDURE — 3288F FALL RISK ASSESSMENT DOCD: CPT | Mod: CPTII,S$GLB,, | Performed by: INTERNAL MEDICINE

## 2023-08-17 PROCEDURE — 99999 PR PBB SHADOW E&M-EST. PATIENT-LVL II: CPT | Mod: PBBFAC,,, | Performed by: INTERNAL MEDICINE

## 2023-08-17 PROCEDURE — 99999 PR PBB SHADOW E&M-EST. PATIENT-LVL II: ICD-10-PCS | Mod: PBBFAC,,, | Performed by: INTERNAL MEDICINE

## 2023-08-17 PROCEDURE — 1157F PR ADVANCE CARE PLAN OR EQUIV PRESENT IN MEDICAL RECORD: ICD-10-PCS | Mod: CPTII,S$GLB,, | Performed by: INTERNAL MEDICINE

## 2023-08-17 PROCEDURE — 1159F PR MEDICATION LIST DOCUMENTED IN MEDICAL RECORD: ICD-10-PCS | Mod: CPTII,S$GLB,, | Performed by: INTERNAL MEDICINE

## 2023-08-17 PROCEDURE — 3078F DIAST BP <80 MM HG: CPT | Mod: CPTII,S$GLB,, | Performed by: INTERNAL MEDICINE

## 2023-08-17 PROCEDURE — 99215 OFFICE O/P EST HI 40 MIN: CPT | Mod: S$GLB,,, | Performed by: INTERNAL MEDICINE

## 2023-08-17 PROCEDURE — 3074F SYST BP LT 130 MM HG: CPT | Mod: CPTII,S$GLB,, | Performed by: INTERNAL MEDICINE

## 2023-08-17 PROCEDURE — 1126F PR PAIN SEVERITY QUANTIFIED, NO PAIN PRESENT: ICD-10-PCS | Mod: CPTII,S$GLB,, | Performed by: INTERNAL MEDICINE

## 2023-08-17 PROCEDURE — 1101F PT FALLS ASSESS-DOCD LE1/YR: CPT | Mod: CPTII,S$GLB,, | Performed by: INTERNAL MEDICINE

## 2023-08-17 RX ORDER — PROPAFENONE HYDROCHLORIDE 150 MG/1
150 TABLET, COATED ORAL EVERY 8 HOURS
Qty: 90 TABLET | Refills: 11 | Status: SHIPPED | OUTPATIENT
Start: 2023-08-17 | End: 2024-08-16

## 2023-08-17 NOTE — TELEPHONE ENCOUNTER
Hello, this is Sherry Galeano, clinical pharmacist with [facility] that is part of your care team.  We have begun working on your prescription that your doctor has sent us. Our next steps include:     Working with your insurance company to obtain approval for your medication  Working with you to ensure your medication is affordable     We will be calling you along the way with updates on your medication but if you have any concerns or receive information that you would like to discuss please reach us at [phone].    Welcome call outcome: Patient/caregiver reached. Transferred to [facility] per pt wishes.

## 2023-08-17 NOTE — PROGRESS NOTES
Subjective:   Patient ID:  Erendira Pretty is a 78 y.o. male who presents for follow-up of No chief complaint on file.  Last clinic note:  Pt with near syncope after drinking cold water and separate episode after eating sandwich.  Patient denies CP, angina or anginal equivalent.   BP fine at home  Echo and NMT/stress normal 2021    Today:  Holter- afib w/ PVCs  Elquis started but pt states muscle pain  Patient denies CP, angina or anginal equivalent.     Hypertension  This is a chronic problem. The current episode started more than 1 year ago. The problem has been gradually improving since onset. The problem is controlled. Pertinent negatives include no chest pain, palpitations or shortness of breath. Past treatments include ACE inhibitors, beta blockers and calcium channel blockers. The current treatment provides moderate improvement. There are no compliance problems.  Hypertensive end-organ damage includes CAD/MI.   Coronary Artery Disease  Presents for follow-up visit. Pertinent negatives include no chest pain, chest pressure, chest tightness, dizziness, leg swelling, muscle weakness, palpitations, shortness of breath or weight gain. The symptoms have been stable. Compliance with diet is variable. Compliance with exercise is variable. Compliance with medications is good.       Review of Systems   Constitutional: Negative. Negative for weight gain.   HENT: Negative.     Eyes: Negative.    Cardiovascular: Negative.  Negative for chest pain, leg swelling and palpitations.   Respiratory: Negative.  Negative for chest tightness and shortness of breath.    Endocrine: Negative.    Hematologic/Lymphatic: Negative.    Skin: Negative.    Musculoskeletal:  Negative for muscle weakness.   Gastrointestinal: Negative.    Genitourinary: Negative.    Neurological: Negative.  Negative for dizziness.   Psychiatric/Behavioral: Negative.     Allergic/Immunologic: Negative.    All other systems reviewed and are negative.    Family  History   Problem Relation Age of Onset    Heart disease Mother     Heart disease Father     Heart disease Brother     Colon cancer Neg Hx      Past Medical History:   Diagnosis Date    Aortic stenosis     Asthma     Benign prostatic hyperplasia with nocturia     Bilateral carotid artery stenosis 2021    US CAROTID BILATERAL 2021 IMPRESSION: Atherosclerosis with suspected stenosis greater than 50% at the right proximal ICA visually. Velocities are discordant and appear improved from prior. Atherosclerosis on the left with no evidence of flow-limiting stenosis greater than 50%.  FINDINGS: Right: Internal Carotid Artery (ICA): Peak systolic velocity 118 cm/sec. End diastolic velocity 35 cm/sec    BPH (benign prostatic hyperplasia)     CAD (coronary artery disease)     40% lesion ;lazo    Cirrhosis     Claudication 2014    Colon polyp     COPD (chronic obstructive pulmonary disease)     ED (erectile dysfunction)     Encounter for blood transfusion     Ex-smoker     Hearing loss NEC     Hepatitis C     Cured;dr gibbs harvoni 2015    Hyperlipidemia     Hypertension     Hypothyroid     s/p tx graves    Open angle with borderline findings and low glaucoma risk in both eyes 2020    DELONTE on CPAP     Osteoarthritis     PVD (peripheral vascular disease)     Secondary esophageal varices without bleeding 2017    Type 2 diabetes mellitus      Social History     Socioeconomic History    Marital status:      Spouse name: YAEL    Number of children: 2   Tobacco Use    Smoking status: Former     Current packs/day: 0.00     Average packs/day: 0.5 packs/day for 4.0 years (2.0 total pack years)     Types: Cigarettes     Start date: 1968     Quit date: 1972     Years since quittin.2    Smokeless tobacco: Former     Types: Chew     Quit date: 1976   Substance and Sexual Activity    Alcohol use: Yes     Alcohol/week: 2.0 standard drinks of alcohol     Types: 2 Cans of beer per  week    Drug use: No    Sexual activity: Yes     Partners: Female   Social History Narrative    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    No pets or smokers in household, lives alone.     Current Outpatient Medications on File Prior to Visit   Medication Sig Dispense Refill    ACCU-CHEK GRACE PLUS METER Misc AS DIRECTED 1 each 0    albuterol (VENTOLIN HFA) 90 mcg/actuation inhaler Inhale 2 puffs into the lungs every 6 (six) hours as needed for Wheezing or Shortness of Breath. Rescue 18 g 3    amLODIPine (NORVASC) 10 MG tablet TAKE 1 TABLET BY MOUTH ONCE A DAY (DOSE INCREASE) 30 tablet 11    apixaban (ELIQUIS) 5 mg Tab Take 1 tablet (5 mg total) by mouth 2 (two) times daily. 60 tablet 11    aspirin (ECOTRIN) 81 MG EC tablet TAKE 1 TABLET BY MOUTH EVERY DAY 90 tablet 4    azelastine (ASTELIN) 137 mcg (0.1 %) nasal spray 2 sprays (274 mcg total) by Nasal route 2 (two) times daily. 30 mL 6    blood sugar diagnostic (ACCU-CHEK GRACE PLUS TEST STRP) Strp TEST THREE TIMES A  strip 11    budesonide-formoterol 160-4.5 mcg (SYMBICORT) 160-4.5 mcg/actuation HFAA INHALE 2 PUFFS INTO THE LUNGS TWO TIMES A DAY. 10.2 g 11    carvediloL (COREG) 6.25 MG tablet TAKE 1 TABLET BY MOUTH TWICE DAILY WITH MEALS 60 tablet 5    empagliflozin (JARDIANCE) 25 mg tablet Take 1 tablet (25 mg total) by mouth once daily. 90 tablet 3    evolocumab (REPATHA SYRINGE) 140 mg/mL Syrg Inject 140 mg into the skin every 14 (fourteen) days. 2 each 11    famotidine (PEPCID) 40 MG tablet Take 1 tablet (40 mg total) by mouth every evening. 30 tablet 11    finasteride (PROSCAR) 5 mg tablet TAKE 1 TABLET BY MOUTH once daily 90 tablet 2    fluticasone propionate (FLONASE) 50 mcg/actuation nasal spray 2 sprays (100 mcg total) by Each Nostril route once daily. 18.2 mL 6    furosemide (LASIX) 40 MG tablet Take 1 tablet (40 mg total) by mouth 2 (two) times daily. 60 tablet 11    GLUCOSAMINE  "HCL/CHONDR ROSARIO A NA (OSTEO BI-FLEX ORAL) Take 1 tablet by mouth 2 (two) times daily.       ipratropium (ATROVENT) 21 mcg (0.03 %) nasal spray 2 sprays by Each Nostril route 2 (two) times daily. 30 mL 0    krill oil 500 mg Cap Take by mouth.      lancets (ACCU-CHEK FASTCLIX LANCET DRUM) Misc TEST TWO TIMES A  each 5    LANTUS SOLOSTAR U-100 INSULIN glargine 100 units/mL SubQ pen INJECT 44 UNITS UNDER THE SKIN EVERY EVENING 45 mL 1    levocetirizine (XYZAL) 5 MG tablet Take 1 tablet (5 mg total) by mouth every evening. 30 tablet 11    levothyroxine (SYNTHROID) 300 MCG Tab Take 1 tablet (300 mcg total) by mouth every morning. 90 tablet 3    lisinopriL 10 MG tablet TAKE 1 BY MOUTH EVERY DAY 30 tablet 7    meloxicam (MOBIC) 15 MG tablet Take 15 mg by mouth.      metFORMIN (GLUCOPHAGE) 500 MG tablet TAKE 1 TABLET BY MOUTH TWICE DAILY WITH MEALS 180 tablet 3    montelukast (SINGULAIR) 10 mg tablet TAKE 1 TABLET BY MOUTH ONCE DAILY 30 tablet 11    neomycin-polymyxin-dexamethasone (DEXACINE) 3.5 mg/g-10,000 unit/g-0.1 % Oint Place into both eyes 2 (two) times a day. Apply to lid margins 3.5 g 0    pen needle, diabetic (BD ULTRA-FINE MINI PEN NEEDLE) 31 gauge x 3/16" Ndle USE AS DIRECTED 100 each 11    RABEprazole (ACIPHEX) 20 mg tablet Take 1 tablet (20 mg total) by mouth 2 (two) times daily. 60 tablet 11    sildenafiL (VIAGRA) 100 MG tablet Take 1/2 to 1 tablet by mouth one hour prior to intercourse. Max 100mg per day 30 tablet 11    sulfamethoxazole-trimethoprim 800-160mg (BACTRIM DS) 800-160 mg Tab Take 1 tablet by mouth 2 (two) times daily. 10 tablet 0     Current Facility-Administered Medications on File Prior to Visit   Medication Dose Route Frequency Provider Last Rate Last Admin    lactated ringers infusion   Intravenous Continuous Omaira Theodore MD   New Bag at 09/09/19 1117     Review of patient's allergies indicates:   Allergen Reactions    Lipitor [atorvastatin] Other (See Comments)     Muscle " aches and pains as well as fatigue.     Niacin preparations Other (See Comments)     Causes broken blood vessels and bruises at 4 times normal dose.    Statins-hmg-coa reductase inhibitors Other (See Comments)     Statins cause intolerable myalgias.    Iodinated contrast media Hives     Tachycardia    Omeprazole Hives and Itching    Prilosec [omeprazole magnesium] Hives and Itching       Objective:     Physical Exam  Vitals and nursing note reviewed.   Constitutional:       Appearance: He is well-developed.   HENT:      Head: Normocephalic and atraumatic.   Eyes:      Conjunctiva/sclera: Conjunctivae normal.      Pupils: Pupils are equal, round, and reactive to light.   Cardiovascular:      Rate and Rhythm: Normal rate and regular rhythm.      Pulses: Intact distal pulses.      Heart sounds: Normal heart sounds.   Pulmonary:      Effort: Pulmonary effort is normal.      Breath sounds: Normal breath sounds.   Abdominal:      General: Bowel sounds are normal.      Palpations: Abdomen is soft.   Musculoskeletal:      Cervical back: Normal range of motion and neck supple.   Skin:     General: Skin is warm and dry.   Neurological:      Mental Status: He is alert and oriented to person, place, and time.         Assessment:     1. Mild intermittent asthma without complication    2. PVC (premature ventricular contraction)    3. PVD (peripheral vascular disease)    4. S/P CABG x 2    5. Angina pectoris    6. Aortic atherosclerosis    7. Paroxysmal atrial fibrillation    8. Essential hypertension    9. Coronary artery disease involving native coronary artery of native heart without angina pectoris    10. Nonrheumatic aortic valve stenosis    11. Statin myopathy    12. DELONTE on CPAP    13. DNR (do not resuscitate)        Plan:     Mild intermittent asthma without complication    PVC (premature ventricular contraction)    PVD (peripheral vascular disease)    S/P CABG x 2    Angina pectoris    Aortic atherosclerosis    Paroxysmal  atrial fibrillation    Essential hypertension    Coronary artery disease involving native coronary artery of native heart without angina pectoris    Nonrheumatic aortic valve stenosis    Statin myopathy    DELONTE on CPAP    DNR (do not resuscitate)        continue coreg 1/2 tab bid- HTN  Continue lisinopril , norvasc - HTN  continue repatha - HLP    Try xarelto  Start propafenone

## 2023-08-21 ENCOUNTER — OFFICE VISIT (OUTPATIENT)
Dept: INTERNAL MEDICINE | Facility: CLINIC | Age: 78
End: 2023-08-21
Payer: MEDICARE

## 2023-08-21 ENCOUNTER — OFFICE VISIT (OUTPATIENT)
Dept: PULMONOLOGY | Facility: CLINIC | Age: 78
End: 2023-08-21
Payer: MEDICARE

## 2023-08-21 VITALS
WEIGHT: 230.63 LBS | OXYGEN SATURATION: 99 % | SYSTOLIC BLOOD PRESSURE: 118 MMHG | HEART RATE: 69 BPM | DIASTOLIC BLOOD PRESSURE: 72 MMHG | RESPIRATION RATE: 16 BRPM | HEIGHT: 67 IN | BODY MASS INDEX: 36.2 KG/M2 | TEMPERATURE: 98 F

## 2023-08-21 VITALS
SYSTOLIC BLOOD PRESSURE: 120 MMHG | OXYGEN SATURATION: 98 % | WEIGHT: 227.75 LBS | HEART RATE: 84 BPM | BODY MASS INDEX: 35.75 KG/M2 | DIASTOLIC BLOOD PRESSURE: 60 MMHG | RESPIRATION RATE: 16 BRPM | HEIGHT: 67 IN

## 2023-08-21 DIAGNOSIS — E11.29 TYPE 2 DIABETES MELLITUS WITH MICROALBUMINURIA, WITH LONG-TERM CURRENT USE OF INSULIN: Chronic | ICD-10-CM

## 2023-08-21 DIAGNOSIS — E03.9 ACQUIRED HYPOTHYROIDISM: Chronic | ICD-10-CM

## 2023-08-21 DIAGNOSIS — I48.0 PAROXYSMAL ATRIAL FIBRILLATION: ICD-10-CM

## 2023-08-21 DIAGNOSIS — G47.33 OSA ON CPAP: Chronic | ICD-10-CM

## 2023-08-21 DIAGNOSIS — I70.0 AORTIC ATHEROSCLEROSIS: ICD-10-CM

## 2023-08-21 DIAGNOSIS — E78.2 MIXED HYPERLIPIDEMIA: ICD-10-CM

## 2023-08-21 DIAGNOSIS — J45.20 MILD INTERMITTENT ASTHMA WITHOUT COMPLICATION: Primary | ICD-10-CM

## 2023-08-21 DIAGNOSIS — I10 ESSENTIAL HYPERTENSION: Chronic | ICD-10-CM

## 2023-08-21 DIAGNOSIS — J42 BRONCHITIS, CHRONIC WITH ACUTE EXACERBATION: ICD-10-CM

## 2023-08-21 DIAGNOSIS — Z79.4 TYPE 2 DIABETES MELLITUS WITH MICROALBUMINURIA, WITH LONG-TERM CURRENT USE OF INSULIN: Chronic | ICD-10-CM

## 2023-08-21 DIAGNOSIS — R80.9 TYPE 2 DIABETES MELLITUS WITH MICROALBUMINURIA, WITH LONG-TERM CURRENT USE OF INSULIN: Chronic | ICD-10-CM

## 2023-08-21 DIAGNOSIS — J20.9 BRONCHITIS, CHRONIC WITH ACUTE EXACERBATION: ICD-10-CM

## 2023-08-21 DIAGNOSIS — E03.9 ACQUIRED HYPOTHYROIDISM: Primary | Chronic | ICD-10-CM

## 2023-08-21 PROCEDURE — 3078F DIAST BP <80 MM HG: CPT | Mod: CPTII,S$GLB,, | Performed by: PEDIATRICS

## 2023-08-21 PROCEDURE — 99214 PR OFFICE/OUTPT VISIT, EST, LEVL IV, 30-39 MIN: ICD-10-PCS | Mod: S$GLB,,, | Performed by: INTERNAL MEDICINE

## 2023-08-21 PROCEDURE — 1101F PR PT FALLS ASSESS DOC 0-1 FALLS W/OUT INJ PAST YR: ICD-10-PCS | Mod: CPTII,S$GLB,, | Performed by: PEDIATRICS

## 2023-08-21 PROCEDURE — 1160F PR REVIEW ALL MEDS BY PRESCRIBER/CLIN PHARMACIST DOCUMENTED: ICD-10-PCS | Mod: CPTII,S$GLB,, | Performed by: INTERNAL MEDICINE

## 2023-08-21 PROCEDURE — 3074F PR MOST RECENT SYSTOLIC BLOOD PRESSURE < 130 MM HG: ICD-10-PCS | Mod: CPTII,S$GLB,, | Performed by: INTERNAL MEDICINE

## 2023-08-21 PROCEDURE — 99999 PR PBB SHADOW E&M-EST. PATIENT-LVL V: ICD-10-PCS | Mod: PBBFAC,,, | Performed by: INTERNAL MEDICINE

## 2023-08-21 PROCEDURE — 3078F PR MOST RECENT DIASTOLIC BLOOD PRESSURE < 80 MM HG: ICD-10-PCS | Mod: CPTII,S$GLB,, | Performed by: INTERNAL MEDICINE

## 2023-08-21 PROCEDURE — 1159F PR MEDICATION LIST DOCUMENTED IN MEDICAL RECORD: ICD-10-PCS | Mod: CPTII,S$GLB,, | Performed by: INTERNAL MEDICINE

## 2023-08-21 PROCEDURE — 99999 PR PBB SHADOW E&M-EST. PATIENT-LVL V: CPT | Mod: PBBFAC,,, | Performed by: PEDIATRICS

## 2023-08-21 PROCEDURE — 1159F PR MEDICATION LIST DOCUMENTED IN MEDICAL RECORD: ICD-10-PCS | Mod: CPTII,S$GLB,, | Performed by: PEDIATRICS

## 2023-08-21 PROCEDURE — 3288F FALL RISK ASSESSMENT DOCD: CPT | Mod: CPTII,S$GLB,, | Performed by: INTERNAL MEDICINE

## 2023-08-21 PROCEDURE — 1157F PR ADVANCE CARE PLAN OR EQUIV PRESENT IN MEDICAL RECORD: ICD-10-PCS | Mod: CPTII,S$GLB,, | Performed by: PEDIATRICS

## 2023-08-21 PROCEDURE — 3072F PR LOW RISK FOR RETINOPATHY: ICD-10-PCS | Mod: CPTII,S$GLB,, | Performed by: PEDIATRICS

## 2023-08-21 PROCEDURE — 1157F ADVNC CARE PLAN IN RCRD: CPT | Mod: CPTII,S$GLB,, | Performed by: INTERNAL MEDICINE

## 2023-08-21 PROCEDURE — 1159F MED LIST DOCD IN RCRD: CPT | Mod: CPTII,S$GLB,, | Performed by: PEDIATRICS

## 2023-08-21 PROCEDURE — 1126F PR PAIN SEVERITY QUANTIFIED, NO PAIN PRESENT: ICD-10-PCS | Mod: CPTII,S$GLB,, | Performed by: PEDIATRICS

## 2023-08-21 PROCEDURE — 99213 OFFICE O/P EST LOW 20 MIN: CPT | Mod: S$GLB,,, | Performed by: PEDIATRICS

## 2023-08-21 PROCEDURE — 3288F PR FALLS RISK ASSESSMENT DOCUMENTED: ICD-10-PCS | Mod: CPTII,S$GLB,, | Performed by: INTERNAL MEDICINE

## 2023-08-21 PROCEDURE — 1159F MED LIST DOCD IN RCRD: CPT | Mod: CPTII,S$GLB,, | Performed by: INTERNAL MEDICINE

## 2023-08-21 PROCEDURE — 1101F PR PT FALLS ASSESS DOC 0-1 FALLS W/OUT INJ PAST YR: ICD-10-PCS | Mod: CPTII,S$GLB,, | Performed by: INTERNAL MEDICINE

## 2023-08-21 PROCEDURE — 3072F PR LOW RISK FOR RETINOPATHY: ICD-10-PCS | Mod: CPTII,S$GLB,, | Performed by: INTERNAL MEDICINE

## 2023-08-21 PROCEDURE — 1126F AMNT PAIN NOTED NONE PRSNT: CPT | Mod: CPTII,S$GLB,, | Performed by: PEDIATRICS

## 2023-08-21 PROCEDURE — 1160F RVW MEDS BY RX/DR IN RCRD: CPT | Mod: CPTII,S$GLB,, | Performed by: PEDIATRICS

## 2023-08-21 PROCEDURE — 99214 OFFICE O/P EST MOD 30 MIN: CPT | Mod: S$GLB,,, | Performed by: INTERNAL MEDICINE

## 2023-08-21 PROCEDURE — 1101F PT FALLS ASSESS-DOCD LE1/YR: CPT | Mod: CPTII,S$GLB,, | Performed by: PEDIATRICS

## 2023-08-21 PROCEDURE — 3074F PR MOST RECENT SYSTOLIC BLOOD PRESSURE < 130 MM HG: ICD-10-PCS | Mod: CPTII,S$GLB,, | Performed by: PEDIATRICS

## 2023-08-21 PROCEDURE — 3288F PR FALLS RISK ASSESSMENT DOCUMENTED: ICD-10-PCS | Mod: CPTII,S$GLB,, | Performed by: PEDIATRICS

## 2023-08-21 PROCEDURE — 99213 PR OFFICE/OUTPT VISIT, EST, LEVL III, 20-29 MIN: ICD-10-PCS | Mod: S$GLB,,, | Performed by: PEDIATRICS

## 2023-08-21 PROCEDURE — 3288F FALL RISK ASSESSMENT DOCD: CPT | Mod: CPTII,S$GLB,, | Performed by: PEDIATRICS

## 2023-08-21 PROCEDURE — 1160F RVW MEDS BY RX/DR IN RCRD: CPT | Mod: CPTII,S$GLB,, | Performed by: INTERNAL MEDICINE

## 2023-08-21 PROCEDURE — 3078F PR MOST RECENT DIASTOLIC BLOOD PRESSURE < 80 MM HG: ICD-10-PCS | Mod: CPTII,S$GLB,, | Performed by: PEDIATRICS

## 2023-08-21 PROCEDURE — 1157F PR ADVANCE CARE PLAN OR EQUIV PRESENT IN MEDICAL RECORD: ICD-10-PCS | Mod: CPTII,S$GLB,, | Performed by: INTERNAL MEDICINE

## 2023-08-21 PROCEDURE — 99999 PR PBB SHADOW E&M-EST. PATIENT-LVL V: ICD-10-PCS | Mod: PBBFAC,,, | Performed by: PEDIATRICS

## 2023-08-21 PROCEDURE — 1101F PT FALLS ASSESS-DOCD LE1/YR: CPT | Mod: CPTII,S$GLB,, | Performed by: INTERNAL MEDICINE

## 2023-08-21 PROCEDURE — 3074F SYST BP LT 130 MM HG: CPT | Mod: CPTII,S$GLB,, | Performed by: PEDIATRICS

## 2023-08-21 PROCEDURE — 3078F DIAST BP <80 MM HG: CPT | Mod: CPTII,S$GLB,, | Performed by: INTERNAL MEDICINE

## 2023-08-21 PROCEDURE — 99999 PR PBB SHADOW E&M-EST. PATIENT-LVL V: CPT | Mod: PBBFAC,,, | Performed by: INTERNAL MEDICINE

## 2023-08-21 PROCEDURE — 3074F SYST BP LT 130 MM HG: CPT | Mod: CPTII,S$GLB,, | Performed by: INTERNAL MEDICINE

## 2023-08-21 PROCEDURE — 3072F LOW RISK FOR RETINOPATHY: CPT | Mod: CPTII,S$GLB,, | Performed by: PEDIATRICS

## 2023-08-21 PROCEDURE — 3072F LOW RISK FOR RETINOPATHY: CPT | Mod: CPTII,S$GLB,, | Performed by: INTERNAL MEDICINE

## 2023-08-21 PROCEDURE — 1157F ADVNC CARE PLAN IN RCRD: CPT | Mod: CPTII,S$GLB,, | Performed by: PEDIATRICS

## 2023-08-21 PROCEDURE — 1160F PR REVIEW ALL MEDS BY PRESCRIBER/CLIN PHARMACIST DOCUMENTED: ICD-10-PCS | Mod: CPTII,S$GLB,, | Performed by: PEDIATRICS

## 2023-08-21 RX ORDER — EVOLOCUMAB 140 MG/ML
INJECTION, SOLUTION SUBCUTANEOUS
COMMUNITY
Start: 2023-07-05

## 2023-08-21 RX ORDER — DOXYCYCLINE 100 MG/1
100 CAPSULE ORAL EVERY 12 HOURS
Qty: 20 CAPSULE | Refills: 0 | Status: SHIPPED | OUTPATIENT
Start: 2023-08-21 | End: 2023-08-31

## 2023-08-21 RX ORDER — METHYLPREDNISOLONE 4 MG/1
TABLET ORAL
Qty: 21 TABLET | Refills: 0 | Status: SHIPPED | OUTPATIENT
Start: 2023-08-21 | End: 2023-12-05

## 2023-08-21 NOTE — PROGRESS NOTES
Subjective:       Patient ID: Erendira Pretty is a 78 y.o. male.         Immunization History   Administered Date(s) Administered    COVID-19, MRNA, LN-S, PF (Pfizer) (Purple Cap) 2021, 2021, 2021    COVID-19, mRNA, LNP-S, bivalent booster, PF (PFIZER OMICRON) 10/03/2022    Hepatitis A 2003, 2004    Hepatitis A, Pediatric/Adolescent, 2 Dose 2003, 2004    Hepatitis B 2003, 2003, 2004, 2014, 2014    Hepatitis B, Adult 2014, 2014, 10/20/2014    Hepatitis B, Pediatric/Adolescent 2003, 2003, 2004    Influenza 2004, 10/17/2007, 10/20/2008, 10/07/2009, 10/20/2010    Influenza (FLUAD) - Trivalent - Adjuvanted - PF (65+) 2018, 2019    Influenza - High Dose - PF (65 years and older) 10/03/2011, 10/29/2013, 10/21/2014, 2015, 11/10/2016, 10/18/2017, 2018, 10/01/2020, 2021    Influenza - Quadrivalent - PF *Preferred* (6 months and older) 2004    Influenza - Trivalent (ADULT) 2004, 10/17/2007, 10/20/2008, 10/07/2009, 10/20/2010, 2012    Influenza A (H1N1) 2009 Monovalent - IM - PF 2009    Influenza Split 2012    Pneumococcal Conjugate - 13 Valent 2015    Pneumococcal Polysaccharide - 23 Valent 2014    Tdap 2014    Zoster 2012    Zoster Recombinant 2018, 2019, 2019     Social History     Tobacco Use   Smoking Status Former    Current packs/day: 0.00    Average packs/day: 0.5 packs/day for 4.0 years (2.0 ttl pk-yrs)    Types: Cigarettes    Start date: 1968    Quit date: 1972    Years since quittin.2   Smokeless Tobacco Former    Types: Chew    Quit date: 1976      Asthma Control Test  In the past 4  weeks, how much of the time did your asthma keep you from getting as much done at work, school or at home?: Most of the time  During the past 4 weeks, how often have you had shortness of breath?: Once a  day  During the past 4 weeks, how often did your asthma symptoms (wheezing, couging, shortness of breath, chest tightness or pain) wake you up at night or earlier than usual in the morning?: 2 or 3 nights a week  During the past 4 weeks, how often have you used your rescue inhaler or nebulizer medication (such as albuterol)?: 2 or 3 times a week  How would you rate your asthma control during the past 4 weeks?: Somewhat controlled  If your score is 19 or less, your asthma may not be under control: 12          EPWORTH SLEEPINESS SCALE 8/21/2023   Sitting and reading 0   Watching TV 3   Sitting, inactive in a public place (e.g. a theatre or a meeting) 0   As a passenger in a car for an hour without a break 0   Lying down to rest in the afternoon when circumstances permit 3   Sitting and talking to someone 0   Sitting quietly after a lunch without alcohol 0   In a car, while stopped for a few minutes in traffic 0   Total score 6              MMRC Dyspnea Scale (4 is worst)     [] MMRC 0: Dyspneic on strenuous excercise (0 points)    [] MMRC 1: Dyspneic on walking a slight hill (0 points)    [] MMRC 2: Dyspneic on walking level ground; must stop occasionally due to breathlessness (1 point)    [] MMRC 3: Must stop for breathlessness after walking 100 yards or after a few minutes (2 points)    [] MMRC 4: Cannot leave house; breathless on dressing/undressing (3 points)        Chief Complaint: Asthma and Sleep Apnea    Mr Maria De Jesus Krishna is 76y.o.   DELONTE on CPAP, Chr wheezy asthmatic bronchitis, Chr nasal congestion  No interval exacerbations  immunizations current  Has no SOB, no cough, congestion  Lingering issue with leg muscle aches: vascular studies normal  LV 04/28/2021  Has new CPAP machine: Dream station 2  Here to review results  1. CPAP Download: CPAP 11 cm, Usage > 4 hrs was 100%  2. Spirometry was Normal  3. CXR Normal  Average AHI 2.5  Asthma score 25     Using nebuliser  Currently on SYMBICORT: refills sent.  No  "daytime sleepiness  Ep worth score 12   Immunizations are current    05/08/2023  Followup  No cough, No SOB, No wheezing  Recently seen ENT  vocal cord ulcer: improved now off Symbicort  CPAp 11 cm  Usage > 4 hrs was 3.8  Usage > 4 hrs was 92.2%  CXR reviewed  [place]      08/21/2023  Followup   Recent cough, congestion, green sputum  Recent burning  from neibhour  CPAP data reviewed  CPAP 11 cmwp  No fever  On SYMBICORT and Albuterol   Seen ENT recently   Occasional afternoon hoarseness  Rinses mouth     Asthma  There is no cough, shortness of breath, sputum production or wheezing. Pertinent negatives include no headaches or orthopnea. His past medical history is significant for asthma.     Review of Systems   Constitutional:  Negative for fatigue.   HENT:  Negative for voice change and congestion.    Eyes: Negative.    Respiratory:  Negative for snoring, cough, sputum production, shortness of breath, wheezing, orthopnea, asthma nighttime symptoms, pleurisy, dyspnea on extertion, use of rescue inhaler, somnolence and Paroxysmal Nocturnal Dyspnea.    Cardiovascular:  Negative for leg swelling.   Genitourinary: Negative.    Endocrine: endocrine negative    Musculoskeletal: Negative.    Skin: Negative.    Gastrointestinal: Negative.    Neurological:  Negative for headaches.   Psychiatric/Behavioral: Negative.     All other systems reviewed and are negative.      Objective:       Vitals:    08/21/23 1359   BP: 120/60   Pulse: 84   Resp: 16   SpO2: 98%   Weight: 103.3 kg (227 lb 11.8 oz)   Height: 5' 7" (1.702 m)         Physical Exam   Constitutional: He is oriented to person, place, and time. He appears well-developed and well-nourished. He is obese.   HENT:   Head: Normocephalic.   Nose: No mucosal edema.   Mouth/Throat: Oropharynx is clear and moist. Mallampati Score: III.   Cardiovascular: Normal rate, regular rhythm and intact distal pulses.   No murmur heard.  Pulmonary/Chest: Normal expansion, symmetric chest " wall expansion, effort normal and breath sounds normal. He has no decreased breath sounds. He has no rhonchi. He has no rales.   Abdominal: Soft. Bowel sounds are normal.   Musculoskeletal:         General: No edema. Normal range of motion.      Cervical back: Normal range of motion and neck supple.   Lymphadenopathy:     He has no cervical adenopathy.   Neurological: He is alert and oriented to person, place, and time. He has normal reflexes.   Skin: Skin is warm and dry.   Psychiatric: He has a normal mood and affect.   Nursing note and vitals reviewed.    Personal Diagnostic Review        Personal Diagnostic Review  [x]  DOWNLOAD  Compliance Information 7/21/2023 - 8/19/2023  Compliance Summary  7/21/2023 - 8/19/2023 (30 days)  Days with Device Usage 27 days  Days without Device Usage 3 days  Percent Days with Device Usage 90.0%  Cumulative Usage 9 days 13 hrs. 3 mins. 49 secs.  Maximum Usage (1 Day) 10 hrs. 8 mins. 32 secs.  Average Usage (All Days) 7 hrs. 38 mins. 7 secs.  Average Usage (Days Used) 8 hrs. 29 mins. 1 secs.  Minimum Usage (1 Day) 3 hrs. 16 mins. 31 secs.  Percent of Days with Usage >= 4 Hours 86.7%  Percent of Days with Usage < 4 Hours 13.3%  Date Range  Total Blower Time 9 days 15 hrs. 34 mins. 53 secs.  CPAP Summary  Average Time in Large Leak Per Day 36 secs.  Average AHI 3.7  CPAP 11.0 cmH2O      [x]  SPIROMETRY        Holter monitor - 24 hour  · Atrial fibrillation  · There were frequent PVCs totalling 719 and averaging 30.44 per hour.  · The longest RR interval was 1900 msec.         No flowsheet data found.      Assessment:       Problem List Items Addressed This Visit       Acquired hypothyroidism (Chronic)    Essential hypertension (Chronic)     Stable on NORVASC         DELONTE on CPAP (Chronic)     [place] [facility]  Using CPAP and benefits  Data 07/21/2023 -08/19/2023  Usage > 4 hrs was 86.7%  CPAP 11 cm    USING AND BENEFITS         Type 2 diabetes mellitus with microalbuminuria, with long-term  current use of insulin (Chronic)     On JARCARLOS [place]         Aortic atherosclerosis    Mixed hyperlipidemia    BMI 36.0-36.9,adult     Patient would benefit from weight loss and has tried to set realistic goals to achieve success. Lifestyle changes were discussed on eating healthy, exercising at least 150 minutes weekly, and reducing sedentary behavior.   Discussed the risk factors associated with obesity: Arthritis/DELONTE/Diabetes/Fatty Liver/Cardiovascular disease/GERD/HTN/HLP.          Mild intermittent asthma without complication - Primary     ACT score 12  Regime Symbicort + albuterol    exacebation    Requested Prescriptions     Signed Prescriptions Disp Refills    doxycycline (VIBRAMYCIN) 100 MG Cap 20 capsule 0     Sig: Take 1 capsule (100 mg total) by mouth every 12 (twelve) hours. for 10 days    methylPREDNISolone (MEDROL DOSEPACK) 4 mg tablet 21 tablet 0     Sig: use as directed             Paroxysmal atrial fibrillation     On rhthymol + Xarelto          Other Visit Diagnoses       Bronchitis, chronic with acute exacerbation        Relevant Medications    doxycycline (VIBRAMYCIN) 100 MG Cap    methylPREDNISolone (MEDROL DOSEPACK) 4 mg tablet             Plan:          Requested Prescriptions     Signed Prescriptions Disp Refills    doxycycline (VIBRAMYCIN) 100 MG Cap 20 capsule 0     Sig: Take 1 capsule (100 mg total) by mouth every 12 (twelve) hours. for 10 days    methylPREDNISolone (MEDROL DOSEPACK) 4 mg tablet 21 tablet 0     Sig: use as directed     No orders of the defined types were placed in this encounter.        Follow up in about 3 months (around 11/10/2023).    This note was prepared using voice recognition system and is likely to have sound alike errors that may have been overlooked even after proof reading.  Please call me with any questions    Discussed diagnosis, its evaluation, treatment and usual course. All questions answered.    Thank you for the courtesy of participating in the care  of this patient    Rishi Molina MD

## 2023-08-21 NOTE — ASSESSMENT & PLAN NOTE
[place]  Using CPAP and benefits  Data 07/21/2023 -08/19/2023  Usage > 4 hrs was 86.7%  CPAP 11 cm    USING AND BENEFITS

## 2023-08-21 NOTE — ASSESSMENT & PLAN NOTE
ACT score 12  [facility]ime Symbicort + albuterol    exacebation    Requested Prescriptions     Signed Prescriptions Disp Refills    doxycycline (VIBRAMYCIN) 100 MG Cap 20 capsule 0     Sig: Take 1 capsule (100 mg total) by mouth every 12 (twelve) hours. for 10 days    methylPREDNISolone (MEDROL DOSEPACK) 4 mg tablet 21 tablet 0     Sig: use as directed

## 2023-08-21 NOTE — ASSESSMENT & PLAN NOTE
Patient would benefit from weight loss and has tried to set realistic goals to achieve success. Lifestyle changes were discussed on eating healthy, exercising at least 150 minutes weekly, and reducing sedentary behavior.   Discussed the risk factors associated with obesity: Arthritis/DELONTE/Diabetes/Fatty Liver/Cardiovascular disease/GERD/HTN/HLP.

## 2023-08-21 NOTE — PROGRESS NOTES
Subjective     Patient ID: Erendira Pretty is a 78 y.o. male.    Chief Complaint: Follow-up    Erendira Pretty is a 78 y.o. male who presents to the clinic for a thyroid follow up.     On levothyroxine 300 mcg a day. Hypothyroid:asymptomatic, no swallowing difficulties, no hyper/hypothyroid symptoms.          LABS REVIEWED: TSH      Review of Systems   Constitutional:  Negative for chills, diaphoresis, fatigue and fever.   HENT:  Negative for sore throat and trouble swallowing.    Respiratory:  Negative for cough and shortness of breath.    Cardiovascular:  Negative for chest pain.   Gastrointestinal:  Negative for abdominal pain, anal bleeding, constipation, diarrhea, nausea and vomiting.   Endocrine: Negative for cold intolerance, heat intolerance, polydipsia, polyphagia and polyuria.   All other systems reviewed and are negative.         Objective     Physical Exam  Constitutional:       General: He is not in acute distress.     Appearance: Normal appearance. He is obese. He is not ill-appearing or toxic-appearing.   Cardiovascular:      Rate and Rhythm: Normal rate and regular rhythm.      Pulses: Normal pulses.      Heart sounds: Normal heart sounds. No murmur heard.     No friction rub. No gallop.   Pulmonary:      Effort: Pulmonary effort is normal.      Breath sounds: Normal breath sounds.   Abdominal:      General: There is no distension.      Palpations: There is no mass.      Tenderness: There is no abdominal tenderness. There is no guarding or rebound.      Hernia: No hernia is present.   Neurological:      Mental Status: He is alert and oriented to person, place, and time.   Psychiatric:         Mood and Affect: Mood normal.         Behavior: Behavior normal.         Thought Content: Thought content normal.         Judgment: Judgment normal.            Assessment and Plan     1. Acquired hypothyroidism        Continue thyroid medication now clinically euthyroid. Keep subspecialty care. Flu, covid, and RSV  vaccines in fall discussed. Follow up scheduled in October.         No follow-ups on file.

## 2023-08-24 ENCOUNTER — TELEPHONE (OUTPATIENT)
Dept: CARDIOLOGY | Facility: HOSPITAL | Age: 78
End: 2023-08-24
Payer: MEDICARE

## 2023-08-28 ENCOUNTER — HOSPITAL ENCOUNTER (OUTPATIENT)
Dept: CARDIOLOGY | Facility: HOSPITAL | Age: 78
Discharge: HOME | End: 2023-08-28
Attending: INTERNAL MEDICINE
Payer: MEDICARE

## 2023-08-28 ENCOUNTER — HOSPITAL ENCOUNTER (OUTPATIENT)
Dept: RADIOLOGY | Facility: HOSPITAL | Age: 78
Discharge: HOME | End: 2023-08-28
Attending: INTERNAL MEDICINE
Payer: MEDICARE

## 2023-08-28 VITALS
WEIGHT: 227 LBS | BODY MASS INDEX: 35.63 KG/M2 | HEIGHT: 67 IN | SYSTOLIC BLOOD PRESSURE: 120 MMHG | DIASTOLIC BLOOD PRESSURE: 60 MMHG

## 2023-08-28 DIAGNOSIS — I48.0 PAROXYSMAL ATRIAL FIBRILLATION: ICD-10-CM

## 2023-08-28 LAB
AORTIC ROOT ANNULUS: 3.66 CM
ASCENDING AORTA: 3.44 CM
AV INDEX (PROSTH): 0.3
AV MEAN GRADIENT: 22 MMHG
AV PEAK GRADIENT: 37 MMHG
AV REGURGITATION PRESSURE HALF TIME: 447.84 MS
AV VALVE AREA BY VELOCITY RATIO: 1.16 CM²
AV VALVE AREA: 1.06 CM²
AV VELOCITY RATIO: 0.32
BSA FOR ECHO PROCEDURE: 2.21 M2
CV ECHO LV RWT: 0.57 CM
CV STRESS BASE HR: 64 BPM
DIASTOLIC BLOOD PRESSURE: 82 MMHG
DOP CALC AO PEAK VEL: 3.05 M/S
DOP CALC AO VTI: 68.3 CM
DOP CALC LVOT AREA: 3.6 CM2
DOP CALC LVOT DIAMETER: 2.13 CM
DOP CALC LVOT PEAK VEL: 0.99 M/S
DOP CALC LVOT STROKE VOLUME: 72.65 CM3
DOP CALC RVOT PEAK VEL: 0.77 M/S
DOP CALC RVOT VTI: 13.7 CM
DOP CALCLVOT PEAK VEL VTI: 20.4 CM
E WAVE DECELERATION TIME: 251.38 MSEC
E/A RATIO: 1.02
ECHO LV POSTERIOR WALL: 1.21 CM (ref 0.6–1.1)
EJECTION FRACTION: 55 %
FRACTIONAL SHORTENING: 28 % (ref 28–44)
INTERVENTRICULAR SEPTUM: 1.41 CM (ref 0.6–1.1)
IVC DIAMETER: 1.74 CM
LA MAJOR: 4.07 CM
LA MINOR: 4.99 CM
LA WIDTH: 4.3 CM
LEFT ATRIUM SIZE: 3.71 CM
LEFT ATRIUM VOLUME INDEX MOD: 33.4 ML/M2
LEFT ATRIUM VOLUME INDEX: 28.5 ML/M2
LEFT ATRIUM VOLUME MOD: 71.06 CM3
LEFT ATRIUM VOLUME: 60.79 CM3
LEFT INTERNAL DIMENSION IN SYSTOLE: 3.06 CM (ref 2.1–4)
LEFT VENTRICLE DIASTOLIC VOLUME INDEX: 37.48 ML/M2
LEFT VENTRICLE DIASTOLIC VOLUME: 79.83 ML
LEFT VENTRICLE MASS INDEX: 96 G/M2
LEFT VENTRICLE SYSTOLIC VOLUME INDEX: 17.2 ML/M2
LEFT VENTRICLE SYSTOLIC VOLUME: 36.61 ML
LEFT VENTRICULAR INTERNAL DIMENSION IN DIASTOLE: 4.23 CM (ref 3.5–6)
LEFT VENTRICULAR MASS: 205.05 G
LVOT MG: 2.19 MMHG
LVOT MV: 0.69 CM/S
MV PEAK A VEL: 1.05 M/S
MV PEAK E VEL: 1.07 M/S
MV STENOSIS PRESSURE HALF TIME: 72.9 MS
MV VALVE AREA P 1/2 METHOD: 3.02 CM2
NUC REST EJECTION FRACTION: 62
NUC STRESS EJECTION FRACTION: 72 %
OHS CV CPX 85 PERCENT MAX PREDICTED HEART RATE MALE: 122
OHS CV CPX MAX PREDICTED HEART RATE: 143
OHS CV CPX PATIENT IS FEMALE: 0
OHS CV CPX PATIENT IS MALE: 1
OHS CV CPX PEAK DIASTOLIC BLOOD PRESSURE: 68 MMHG
OHS CV CPX PEAK HEAR RATE: 98 BPM
OHS CV CPX PEAK RATE PRESSURE PRODUCT: NORMAL
OHS CV CPX PEAK SYSTOLIC BLOOD PRESSURE: 136 MMHG
OHS CV CPX PERCENT MAX PREDICTED HEART RATE ACHIEVED: 69
OHS CV CPX RATE PRESSURE PRODUCT PRESENTING: 9152
PISA AR MAX VEL: 4.1 M/S
PISA TR MAX VEL: 2.62 M/S
PV MEAN GRADIENT: 1 MMHG
PV MV: 0.78 M/S
PV PEAK GRADIENT: 6 MMHG
PV PEAK VELOCITY: 1.26 M/S
RA MAJOR: 4.43 CM
RA PRESSURE ESTIMATED: 3 MMHG
RA WIDTH: 5.1 CM
RIGHT VENTRICULAR END-DIASTOLIC DIMENSION: 3.83 CM
RV TB RVSP: 6 MMHG
SINUS: 3.43 CM
STJ: 2.72 CM
SYSTOLIC BLOOD PRESSURE: 143 MMHG
TR MAX PG: 27 MMHG
TV REST PULMONARY ARTERY PRESSURE: 30 MMHG
Z-SCORE OF LEFT VENTRICULAR DIMENSION IN END DIASTOLE: -4.73
Z-SCORE OF LEFT VENTRICULAR DIMENSION IN END SYSTOLE: -2.39

## 2023-08-28 PROCEDURE — 93018 NUCLEAR STRESS - CARDIOLOGY INTERPRETED (CUPID ONLY): ICD-10-PCS | Mod: ,,, | Performed by: INTERNAL MEDICINE

## 2023-08-28 PROCEDURE — 93017 CV STRESS TEST TRACING ONLY: CPT

## 2023-08-28 PROCEDURE — 78452 HT MUSCLE IMAGE SPECT MULT: CPT

## 2023-08-28 PROCEDURE — 93016 CV STRESS TEST SUPVJ ONLY: CPT | Mod: ,,, | Performed by: INTERNAL MEDICINE

## 2023-08-28 PROCEDURE — 93306 TTE W/DOPPLER COMPLETE: CPT | Mod: 26,,, | Performed by: INTERNAL MEDICINE

## 2023-08-28 PROCEDURE — 93016 NUCLEAR STRESS - CARDIOLOGY INTERPRETED (CUPID ONLY): ICD-10-PCS | Mod: ,,, | Performed by: INTERNAL MEDICINE

## 2023-08-28 PROCEDURE — 93306 ECHO (CUPID ONLY): ICD-10-PCS | Mod: 26,,, | Performed by: INTERNAL MEDICINE

## 2023-08-28 PROCEDURE — 78452 NUCLEAR STRESS - CARDIOLOGY INTERPRETED (CUPID ONLY): ICD-10-PCS | Mod: 26,,, | Performed by: INTERNAL MEDICINE

## 2023-08-28 PROCEDURE — 78452 HT MUSCLE IMAGE SPECT MULT: CPT | Mod: 26,,, | Performed by: INTERNAL MEDICINE

## 2023-08-28 PROCEDURE — 93018 CV STRESS TEST I&R ONLY: CPT | Mod: ,,, | Performed by: INTERNAL MEDICINE

## 2023-08-28 PROCEDURE — 93306 TTE W/DOPPLER COMPLETE: CPT

## 2023-08-28 PROCEDURE — 63600175 PHARM REV CODE 636 W HCPCS: Performed by: INTERNAL MEDICINE

## 2023-08-28 RX ORDER — REGADENOSON 0.08 MG/ML
0.4 INJECTION, SOLUTION INTRAVENOUS ONCE
Status: COMPLETED | OUTPATIENT
Start: 2023-08-28 | End: 2023-08-28

## 2023-08-28 RX ADMIN — REGADENOSON 0.4 MG: 0.08 INJECTION, SOLUTION INTRAVENOUS at 11:08

## 2023-08-30 ENCOUNTER — TELEPHONE (OUTPATIENT)
Dept: CARDIOLOGY | Facility: CLINIC | Age: 78
End: 2023-08-30
Payer: MEDICARE

## 2023-08-30 NOTE — TELEPHONE ENCOUNTER
Spoke with pt and discussed echo and stress test results. Pt verbalized understanding and will call back with any further questions or concerns.     ----- Message from Frank Gallardo MD sent at 8/30/2023  9:43 AM CDT -----  Please tell pt:  Echo shows normal systolic function, moderate AS  Stress test is normal  Continue current meds

## 2023-09-01 ENCOUNTER — TELEPHONE (OUTPATIENT)
Dept: HEPATOLOGY | Facility: CLINIC | Age: 78
End: 2023-09-01
Payer: MEDICARE

## 2023-09-01 DIAGNOSIS — C22.0 HCC (HEPATOCELLULAR CARCINOMA): Primary | ICD-10-CM

## 2023-09-01 NOTE — TELEPHONE ENCOUNTER
----- Message from Clyde Evans sent at 9/1/2023  8:33 AM CDT -----  Good Morning,    This patient will need to have a STAT creatinine done before his MRI on Tuesday September 5th. Can you please put an order in?    Thank you,  Clyde  Radiology Dept.

## 2023-09-05 ENCOUNTER — HOSPITAL ENCOUNTER (OUTPATIENT)
Dept: RADIOLOGY | Facility: HOSPITAL | Age: 78
Discharge: HOME | End: 2023-09-05
Attending: INTERNAL MEDICINE
Payer: MEDICARE

## 2023-09-05 DIAGNOSIS — C22.0 HCC (HEPATOCELLULAR CARCINOMA): ICD-10-CM

## 2023-09-05 PROCEDURE — 74183 MRI ABDOMEN W WO CONTRAST: ICD-10-PCS | Mod: 26,,, | Performed by: RADIOLOGY

## 2023-09-05 PROCEDURE — 74183 MRI ABD W/O CNTR FLWD CNTR: CPT | Mod: TC

## 2023-09-05 PROCEDURE — 74183 MRI ABD W/O CNTR FLWD CNTR: CPT | Mod: 26,,, | Performed by: RADIOLOGY

## 2023-09-05 PROCEDURE — 25500020 PHARM REV CODE 255: Performed by: INTERNAL MEDICINE

## 2023-09-05 PROCEDURE — A9585 GADOBUTROL INJECTION: HCPCS | Performed by: INTERNAL MEDICINE

## 2023-09-05 RX ORDER — GADOBUTROL 604.72 MG/ML
10 INJECTION INTRAVENOUS
Status: COMPLETED | OUTPATIENT
Start: 2023-09-05 | End: 2023-09-05

## 2023-09-05 RX ADMIN — GADOBUTROL 10 ML: 604.72 INJECTION INTRAVENOUS at 08:09

## 2023-09-06 ENCOUNTER — OFFICE VISIT (OUTPATIENT)
Dept: HEPATOLOGY | Facility: CLINIC | Age: 78
End: 2023-09-06
Payer: MEDICARE

## 2023-09-06 VITALS
SYSTOLIC BLOOD PRESSURE: 132 MMHG | WEIGHT: 230.19 LBS | HEART RATE: 74 BPM | HEIGHT: 67 IN | DIASTOLIC BLOOD PRESSURE: 74 MMHG | BODY MASS INDEX: 36.13 KG/M2

## 2023-09-06 DIAGNOSIS — I85.10 SECONDARY ESOPHAGEAL VARICES WITHOUT BLEEDING: ICD-10-CM

## 2023-09-06 DIAGNOSIS — K74.69 OTHER CIRRHOSIS OF LIVER: ICD-10-CM

## 2023-09-06 DIAGNOSIS — C22.0 HCC (HEPATOCELLULAR CARCINOMA): Primary | ICD-10-CM

## 2023-09-06 PROCEDURE — 3072F LOW RISK FOR RETINOPATHY: CPT | Mod: CPTII,S$GLB,, | Performed by: INTERNAL MEDICINE

## 2023-09-06 PROCEDURE — 1160F PR REVIEW ALL MEDS BY PRESCRIBER/CLIN PHARMACIST DOCUMENTED: ICD-10-PCS | Mod: CPTII,S$GLB,, | Performed by: INTERNAL MEDICINE

## 2023-09-06 PROCEDURE — 99213 PR OFFICE/OUTPT VISIT, EST, LEVL III, 20-29 MIN: ICD-10-PCS | Mod: S$GLB,,, | Performed by: INTERNAL MEDICINE

## 2023-09-06 PROCEDURE — 99213 OFFICE O/P EST LOW 20 MIN: CPT | Mod: S$GLB,,, | Performed by: INTERNAL MEDICINE

## 2023-09-06 PROCEDURE — 3075F SYST BP GE 130 - 139MM HG: CPT | Mod: CPTII,S$GLB,, | Performed by: INTERNAL MEDICINE

## 2023-09-06 PROCEDURE — 1159F MED LIST DOCD IN RCRD: CPT | Mod: CPTII,S$GLB,, | Performed by: INTERNAL MEDICINE

## 2023-09-06 PROCEDURE — 99999 PR PBB SHADOW E&M-EST. PATIENT-LVL V: ICD-10-PCS | Mod: PBBFAC,,, | Performed by: INTERNAL MEDICINE

## 2023-09-06 PROCEDURE — 1160F RVW MEDS BY RX/DR IN RCRD: CPT | Mod: CPTII,S$GLB,, | Performed by: INTERNAL MEDICINE

## 2023-09-06 PROCEDURE — 1159F PR MEDICATION LIST DOCUMENTED IN MEDICAL RECORD: ICD-10-PCS | Mod: CPTII,S$GLB,, | Performed by: INTERNAL MEDICINE

## 2023-09-06 PROCEDURE — 99999 PR PBB SHADOW E&M-EST. PATIENT-LVL V: CPT | Mod: PBBFAC,,, | Performed by: INTERNAL MEDICINE

## 2023-09-06 PROCEDURE — 3075F PR MOST RECENT SYSTOLIC BLOOD PRESS GE 130-139MM HG: ICD-10-PCS | Mod: CPTII,S$GLB,, | Performed by: INTERNAL MEDICINE

## 2023-09-06 PROCEDURE — 1157F PR ADVANCE CARE PLAN OR EQUIV PRESENT IN MEDICAL RECORD: ICD-10-PCS | Mod: CPTII,S$GLB,, | Performed by: INTERNAL MEDICINE

## 2023-09-06 PROCEDURE — 1157F ADVNC CARE PLAN IN RCRD: CPT | Mod: CPTII,S$GLB,, | Performed by: INTERNAL MEDICINE

## 2023-09-06 PROCEDURE — 3288F FALL RISK ASSESSMENT DOCD: CPT | Mod: CPTII,S$GLB,, | Performed by: INTERNAL MEDICINE

## 2023-09-06 PROCEDURE — 1101F PR PT FALLS ASSESS DOC 0-1 FALLS W/OUT INJ PAST YR: ICD-10-PCS | Mod: CPTII,S$GLB,, | Performed by: INTERNAL MEDICINE

## 2023-09-06 PROCEDURE — 3072F PR LOW RISK FOR RETINOPATHY: ICD-10-PCS | Mod: CPTII,S$GLB,, | Performed by: INTERNAL MEDICINE

## 2023-09-06 PROCEDURE — 3078F DIAST BP <80 MM HG: CPT | Mod: CPTII,S$GLB,, | Performed by: INTERNAL MEDICINE

## 2023-09-06 PROCEDURE — 1101F PT FALLS ASSESS-DOCD LE1/YR: CPT | Mod: CPTII,S$GLB,, | Performed by: INTERNAL MEDICINE

## 2023-09-06 PROCEDURE — 3078F PR MOST RECENT DIASTOLIC BLOOD PRESSURE < 80 MM HG: ICD-10-PCS | Mod: CPTII,S$GLB,, | Performed by: INTERNAL MEDICINE

## 2023-09-06 PROCEDURE — 3288F PR FALLS RISK ASSESSMENT DOCUMENTED: ICD-10-PCS | Mod: CPTII,S$GLB,, | Performed by: INTERNAL MEDICINE

## 2023-09-06 PROCEDURE — 1126F PR PAIN SEVERITY QUANTIFIED, NO PAIN PRESENT: ICD-10-PCS | Mod: CPTII,S$GLB,, | Performed by: INTERNAL MEDICINE

## 2023-09-06 PROCEDURE — 1126F AMNT PAIN NOTED NONE PRSNT: CPT | Mod: CPTII,S$GLB,, | Performed by: INTERNAL MEDICINE

## 2023-09-06 RX ORDER — CARVEDILOL 3.12 MG/1
3.12 TABLET ORAL 2 TIMES DAILY WITH MEALS
Qty: 60 TABLET | Refills: 5 | Status: SHIPPED | OUTPATIENT
Start: 2023-09-06 | End: 2024-02-16

## 2023-09-06 NOTE — PROGRESS NOTES
Subjective:     Erendira Pretty is here for follow up of cirrhosis    HPI  Since Erendira Pretty's last visit he was having issue where food got stuck 1 time and water got stuck 1 time but it resulted in feel like he had to pass out.  It only happened on these 2 occasions.  He did have these evaluated with an esophagram that was overall unremarkable except for question of possible motility concern.  He also had upper endoscopy last year without any evidence of obstruction and no obstruction on the esophagram.  Also had a cardiovascular evaluation to make sure that was not contributing to his symptoms and that was negative except for he was found to have atrial fibrillation.  Because of the atrial fibrillation he was put on anticoagulation.  Has been taking his carvedilol but he has been cutting the 6.25 mg dosing in half.    No evidence of liver decompensation: no ascites, confusion or GI bleeding.    HCC s/p ablation 9/2019, 1/2022    Review of Systems    Objective:     Physical Exam  Vitals reviewed.   Constitutional:       General: He is not in acute distress.     Appearance: He is well-developed.   HENT:      Head: Normocephalic and atraumatic.      Mouth/Throat:      Pharynx: No oropharyngeal exudate.   Eyes:      General: No scleral icterus.        Right eye: No discharge.         Left eye: No discharge.      Conjunctiva/sclera: Conjunctivae normal.      Pupils: Pupils are equal, round, and reactive to light.   Pulmonary:      Effort: Pulmonary effort is normal. No respiratory distress.      Breath sounds: Normal breath sounds. No wheezing.   Abdominal:      General: There is no distension.      Palpations: Abdomen is soft.      Tenderness: There is no abdominal tenderness.   Musculoskeletal:      Right lower leg: No edema.      Left lower leg: No edema.   Neurological:      Mental Status: He is alert and oriented to person, place, and time.   Psychiatric:         Behavior: Behavior normal.         MELD 3.0: 7  at 8/3/2023  7:33 AM  MELD-Na: 7 at 8/3/2023  7:33 AM  Calculated from:  Serum Creatinine: 1.0 mg/dL at 8/3/2023  7:33 AM  Serum Sodium: 138 mmol/L (Using max of 137 mmol/L) at 8/3/2023  7:33 AM  Total Bilirubin: 0.4 mg/dL (Using min of 1 mg/dL) at 8/3/2023  7:33 AM  Serum Albumin: 3.5 g/dL at 8/3/2023  7:33 AM  INR(ratio): 1.1 at 8/3/2023  7:33 AM  Age at listing (hypothetical): 78 years  Sex: Male at 8/3/2023  7:33 AM      WBC   Date Value Ref Range Status   08/03/2023 4.02 3.90 - 12.70 K/uL Final     Hemoglobin   Date Value Ref Range Status   08/03/2023 14.3 14.0 - 18.0 g/dL Final     POC Hematocrit   Date Value Ref Range Status   11/05/2018 26 (L) 36 - 54 %PCV Final     Hematocrit   Date Value Ref Range Status   08/03/2023 43.0 40.0 - 54.0 % Final     Platelets   Date Value Ref Range Status   08/03/2023 168 150 - 450 K/uL Final     BUN   Date Value Ref Range Status   08/03/2023 18 8 - 23 mg/dL Final     Creatinine   Date Value Ref Range Status   08/03/2023 1.0 0.5 - 1.4 mg/dL Final     Glucose   Date Value Ref Range Status   08/03/2023 109 70 - 110 mg/dL Final     Calcium   Date Value Ref Range Status   08/03/2023 8.9 8.7 - 10.5 mg/dL Final     Sodium   Date Value Ref Range Status   08/03/2023 138 136 - 145 mmol/L Final     Potassium   Date Value Ref Range Status   08/03/2023 4.0 3.5 - 5.1 mmol/L Final     Chloride   Date Value Ref Range Status   08/03/2023 102 95 - 110 mmol/L Final     Magnesium   Date Value Ref Range Status   11/08/2018 2.1 1.6 - 2.6 mg/dL Final     AST   Date Value Ref Range Status   08/03/2023 27 10 - 40 U/L Final     ALT   Date Value Ref Range Status   08/03/2023 24 10 - 44 U/L Final     Alkaline Phosphatase   Date Value Ref Range Status   08/03/2023 88 55 - 135 U/L Final     Total Bilirubin   Date Value Ref Range Status   08/03/2023 0.4 0.1 - 1.0 mg/dL Final     Comment:     For infants and newborns, interpretation of results should be based  on gestational age, weight and in agreement  with clinical  observations.    Premature Infant recommended reference ranges:  Up to 24 hours.............<8.0 mg/dL  Up to 48 hours............<12.0 mg/dL  3-5 days..................<15.0 mg/dL  6-29 days.................<15.0 mg/dL       Albumin   Date Value Ref Range Status   08/03/2023 3.5 3.5 - 5.2 g/dL Final     INR   Date Value Ref Range Status   08/03/2023 1.1 0.8 - 1.2 Final     Comment:     Coumadin Therapy:  2.0 - 3.0 for INR for all indicators except mechanical heart valves  and antiphospholipid syndromes which should use 2.5 - 3.5.           Assessment/Plan:     1. HCC (hepatocellular carcinoma)    2. Other cirrhosis of liver    3. Secondary esophageal varices without bleeding      Erendira Pretty is a 78 y.o. male withCirrhosis    HCC (hepatocellular carcinoma)-no evidence of recurrence  -continue with cross-sectional imaging for monitoring  -     MRI Abdomen W WO Contrast; Future; Expected date: 09/06/2023  -     AFP Tumor Marker; Future; Expected date: 09/06/2023    Other cirrhosis of liver-low meld, well compensated  -continue to monitor meld score  -     MRI Abdomen W WO Contrast; Future; Expected date: 09/06/2023  -     Comprehensive Metabolic Panel; Future; Expected date: 09/06/2023  -     CBC Auto Differential; Future; Expected date: 09/06/2023  -     AFP Tumor Marker; Future; Expected date: 09/06/2023  -     Protime-INR; Future; Expected date: 09/06/2023    Secondary esophageal varices without bleeding-no history of variceal bleeding  -continues beta-blocker for variceal bleeding prophylaxis  -     carvediloL (COREG) 3.125 MG tablet; Take 1 tablet (3.125 mg total) by mouth 2 (two) times daily with meals.  Dispense: 60 tablet; Refill: 5      RTC in 6 months with preclinic labs and imaging    Anna Wood MD

## 2023-09-15 ENCOUNTER — TELEPHONE (OUTPATIENT)
Dept: CARDIOLOGY | Facility: CLINIC | Age: 78
End: 2023-09-15
Payer: MEDICARE

## 2023-09-15 NOTE — TELEPHONE ENCOUNTER
Followed up with Erendira, scheduled appointment for patient. Patient verbalized understanding of date, time, and location of appointment.

## 2023-09-15 NOTE — TELEPHONE ENCOUNTER
----- Message from Frank Gallardo MD sent at 9/14/2023  9:02 PM CDT -----  Contact: pt  Lipid profile  ----- Message -----  From: Annie Olvera MA  Sent: 9/14/2023  12:04 PM CDT  To: Frank Gallardo MD    Patient stated you wanted labs prior to his visit but I don't see any to order. If you want him to have some please order and I'll schedule prior to his appt. on 09/21  ----- Message -----  From: Carmencita Yuen  Sent: 9/14/2023  10:46 AM CDT  To: Sami HAMEED Staff    Pt is calling in regard to have the order put in for his blood work, pt see  on 9/21 next week.

## 2023-09-18 ENCOUNTER — LAB VISIT (OUTPATIENT)
Dept: LAB | Facility: HOSPITAL | Age: 78
End: 2023-09-18
Attending: INTERNAL MEDICINE
Payer: MEDICARE

## 2023-09-18 DIAGNOSIS — E78.00 PURE HYPERCHOLESTEROLEMIA: ICD-10-CM

## 2023-09-18 LAB
CHOLEST SERPL-MCNC: 118 MG/DL (ref 120–199)
CHOLEST/HDLC SERPL: 2.2 {RATIO} (ref 2–5)
HDLC SERPL-MCNC: 54 MG/DL (ref 40–75)
HDLC SERPL: 45.8 % (ref 20–50)
LDLC SERPL CALC-MCNC: 41 MG/DL (ref 63–159)
NONHDLC SERPL-MCNC: 64 MG/DL
TRIGL SERPL-MCNC: 115 MG/DL (ref 30–150)

## 2023-09-18 PROCEDURE — 36415 COLL VENOUS BLD VENIPUNCTURE: CPT | Mod: PO | Performed by: INTERNAL MEDICINE

## 2023-09-18 PROCEDURE — 80061 LIPID PANEL: CPT | Performed by: INTERNAL MEDICINE

## 2023-09-19 ENCOUNTER — TELEPHONE (OUTPATIENT)
Dept: CARDIOLOGY | Facility: CLINIC | Age: 78
End: 2023-09-19
Payer: MEDICARE

## 2023-09-19 NOTE — TELEPHONE ENCOUNTER
Spoke with pt and discussed lab results. Pt verbalized understanding and will call back with any further questions or concerns.     ----- Message from Frank Gallardo MD sent at 9/19/2023  1:41 PM CDT -----  Lipids reviewed  Continue current meds

## 2023-09-21 ENCOUNTER — OFFICE VISIT (OUTPATIENT)
Dept: CARDIOLOGY | Facility: CLINIC | Age: 78
End: 2023-09-21
Payer: MEDICARE

## 2023-09-21 VITALS
HEIGHT: 67 IN | OXYGEN SATURATION: 96 % | WEIGHT: 229.5 LBS | SYSTOLIC BLOOD PRESSURE: 124 MMHG | DIASTOLIC BLOOD PRESSURE: 59 MMHG | HEART RATE: 77 BPM | BODY MASS INDEX: 36.02 KG/M2

## 2023-09-21 DIAGNOSIS — I25.10 CORONARY ARTERY DISEASE INVOLVING NATIVE CORONARY ARTERY OF NATIVE HEART WITHOUT ANGINA PECTORIS: Chronic | ICD-10-CM

## 2023-09-21 DIAGNOSIS — G72.0 STATIN MYOPATHY: ICD-10-CM

## 2023-09-21 DIAGNOSIS — I48.0 PAROXYSMAL ATRIAL FIBRILLATION: ICD-10-CM

## 2023-09-21 DIAGNOSIS — I20.9 ANGINA PECTORIS: ICD-10-CM

## 2023-09-21 DIAGNOSIS — D50.0 IRON DEFICIENCY ANEMIA DUE TO CHRONIC BLOOD LOSS: ICD-10-CM

## 2023-09-21 DIAGNOSIS — J45.20 MILD INTERMITTENT ASTHMA WITHOUT COMPLICATION: Primary | ICD-10-CM

## 2023-09-21 DIAGNOSIS — E78.2 MIXED HYPERLIPIDEMIA: ICD-10-CM

## 2023-09-21 DIAGNOSIS — C22.0 HCC (HEPATOCELLULAR CARCINOMA): ICD-10-CM

## 2023-09-21 DIAGNOSIS — T46.6X5A STATIN MYOPATHY: ICD-10-CM

## 2023-09-21 DIAGNOSIS — I35.0 NONRHEUMATIC AORTIC VALVE STENOSIS: Chronic | ICD-10-CM

## 2023-09-21 DIAGNOSIS — K21.00 GASTROESOPHAGEAL REFLUX DISEASE WITH ESOPHAGITIS WITHOUT HEMORRHAGE: ICD-10-CM

## 2023-09-21 DIAGNOSIS — G47.33 OSA ON CPAP: Chronic | ICD-10-CM

## 2023-09-21 DIAGNOSIS — I10 ESSENTIAL HYPERTENSION: Chronic | ICD-10-CM

## 2023-09-21 DIAGNOSIS — I85.10 SECONDARY ESOPHAGEAL VARICES WITHOUT BLEEDING: Chronic | ICD-10-CM

## 2023-09-21 DIAGNOSIS — Z95.1 S/P CABG X 2: ICD-10-CM

## 2023-09-21 DIAGNOSIS — I73.9 PVD (PERIPHERAL VASCULAR DISEASE): ICD-10-CM

## 2023-09-21 DIAGNOSIS — I70.0 AORTIC ATHEROSCLEROSIS: ICD-10-CM

## 2023-09-21 DIAGNOSIS — I49.3 PVC (PREMATURE VENTRICULAR CONTRACTION): ICD-10-CM

## 2023-09-21 PROCEDURE — 99999 PR PBB SHADOW E&M-EST. PATIENT-LVL V: ICD-10-PCS | Mod: PBBFAC,,, | Performed by: INTERNAL MEDICINE

## 2023-09-21 PROCEDURE — 1157F ADVNC CARE PLAN IN RCRD: CPT | Mod: CPTII,S$GLB,, | Performed by: INTERNAL MEDICINE

## 2023-09-21 PROCEDURE — 1157F PR ADVANCE CARE PLAN OR EQUIV PRESENT IN MEDICAL RECORD: ICD-10-PCS | Mod: CPTII,S$GLB,, | Performed by: INTERNAL MEDICINE

## 2023-09-21 PROCEDURE — 1159F PR MEDICATION LIST DOCUMENTED IN MEDICAL RECORD: ICD-10-PCS | Mod: CPTII,S$GLB,, | Performed by: INTERNAL MEDICINE

## 2023-09-21 PROCEDURE — 1101F PR PT FALLS ASSESS DOC 0-1 FALLS W/OUT INJ PAST YR: ICD-10-PCS | Mod: CPTII,S$GLB,, | Performed by: INTERNAL MEDICINE

## 2023-09-21 PROCEDURE — 1126F PR PAIN SEVERITY QUANTIFIED, NO PAIN PRESENT: ICD-10-PCS | Mod: CPTII,S$GLB,, | Performed by: INTERNAL MEDICINE

## 2023-09-21 PROCEDURE — 99214 PR OFFICE/OUTPT VISIT, EST, LEVL IV, 30-39 MIN: ICD-10-PCS | Mod: S$GLB,,, | Performed by: INTERNAL MEDICINE

## 2023-09-21 PROCEDURE — 3074F PR MOST RECENT SYSTOLIC BLOOD PRESSURE < 130 MM HG: ICD-10-PCS | Mod: CPTII,S$GLB,, | Performed by: INTERNAL MEDICINE

## 2023-09-21 PROCEDURE — 99214 OFFICE O/P EST MOD 30 MIN: CPT | Mod: S$GLB,,, | Performed by: INTERNAL MEDICINE

## 2023-09-21 PROCEDURE — 1159F MED LIST DOCD IN RCRD: CPT | Mod: CPTII,S$GLB,, | Performed by: INTERNAL MEDICINE

## 2023-09-21 PROCEDURE — 3072F PR LOW RISK FOR RETINOPATHY: ICD-10-PCS | Mod: CPTII,S$GLB,, | Performed by: INTERNAL MEDICINE

## 2023-09-21 PROCEDURE — 3288F FALL RISK ASSESSMENT DOCD: CPT | Mod: CPTII,S$GLB,, | Performed by: INTERNAL MEDICINE

## 2023-09-21 PROCEDURE — 3074F SYST BP LT 130 MM HG: CPT | Mod: CPTII,S$GLB,, | Performed by: INTERNAL MEDICINE

## 2023-09-21 PROCEDURE — 3078F DIAST BP <80 MM HG: CPT | Mod: CPTII,S$GLB,, | Performed by: INTERNAL MEDICINE

## 2023-09-21 PROCEDURE — 1101F PT FALLS ASSESS-DOCD LE1/YR: CPT | Mod: CPTII,S$GLB,, | Performed by: INTERNAL MEDICINE

## 2023-09-21 PROCEDURE — 3078F PR MOST RECENT DIASTOLIC BLOOD PRESSURE < 80 MM HG: ICD-10-PCS | Mod: CPTII,S$GLB,, | Performed by: INTERNAL MEDICINE

## 2023-09-21 PROCEDURE — 3072F LOW RISK FOR RETINOPATHY: CPT | Mod: CPTII,S$GLB,, | Performed by: INTERNAL MEDICINE

## 2023-09-21 PROCEDURE — 1160F RVW MEDS BY RX/DR IN RCRD: CPT | Mod: CPTII,S$GLB,, | Performed by: INTERNAL MEDICINE

## 2023-09-21 PROCEDURE — 1126F AMNT PAIN NOTED NONE PRSNT: CPT | Mod: CPTII,S$GLB,, | Performed by: INTERNAL MEDICINE

## 2023-09-21 PROCEDURE — 3288F PR FALLS RISK ASSESSMENT DOCUMENTED: ICD-10-PCS | Mod: CPTII,S$GLB,, | Performed by: INTERNAL MEDICINE

## 2023-09-21 PROCEDURE — 99999 PR PBB SHADOW E&M-EST. PATIENT-LVL V: CPT | Mod: PBBFAC,,, | Performed by: INTERNAL MEDICINE

## 2023-09-21 PROCEDURE — 1160F PR REVIEW ALL MEDS BY PRESCRIBER/CLIN PHARMACIST DOCUMENTED: ICD-10-PCS | Mod: CPTII,S$GLB,, | Performed by: INTERNAL MEDICINE

## 2023-09-21 NOTE — PROGRESS NOTES
Subjective:   Patient ID:  Erendira Pretty is a 78 y.o. male who presents for follow-up of PVC, Hypertension, CAD F/U, and Atrial Fibrillation  Last clinic note:  Holter- afib w/ PVCs  Elquis started but pt states muscle pain?  Patient denies CP, angina or anginal equivalent.      Eliquis dc, xarelto and propafenone started    Today:  Tolerating xarelto and propafenone  Patient denies CP, angina or anginal equivalent.   NMT/stress and echo negative    Hypertension  This is a chronic problem. The current episode started more than 1 year ago. The problem has been gradually improving since onset. The problem is controlled. Pertinent negatives include no chest pain, palpitations or shortness of breath. Past treatments include ACE inhibitors, beta blockers and calcium channel blockers. The current treatment provides moderate improvement. There are no compliance problems.  Hypertensive end-organ damage includes CAD/MI.   Coronary Artery Disease  Presents for follow-up visit. Pertinent negatives include no chest pain, chest pressure, chest tightness, dizziness, leg swelling, muscle weakness, palpitations, shortness of breath or weight gain. Risk factors include hyperlipidemia. The symptoms have been stable. Compliance with diet is variable. Compliance with exercise is variable. Compliance with medications is good.   Hyperlipidemia  This is a chronic problem. The current episode started more than 1 year ago. The problem is controlled. Pertinent negatives include no chest pain or shortness of breath. Treatments tried: repatha. The current treatment provides moderate improvement of lipids. There are no compliance problems.        Review of Systems   Constitutional: Negative. Negative for weight gain.   HENT: Negative.     Eyes: Negative.    Cardiovascular: Negative.  Negative for chest pain, leg swelling and palpitations.   Respiratory: Negative.  Negative for chest tightness and shortness of breath.    Endocrine: Negative.     Hematologic/Lymphatic: Negative.    Skin: Negative.    Musculoskeletal:  Negative for muscle weakness.   Gastrointestinal: Negative.    Genitourinary: Negative.    Neurological: Negative.  Negative for dizziness.   Psychiatric/Behavioral: Negative.     Allergic/Immunologic: Negative.    All other systems reviewed and are negative.  Family History   Problem Relation Age of Onset    Heart disease Mother     Heart disease Father     Heart disease Brother     Colon cancer Neg Hx      Past Medical History:   Diagnosis Date    Aortic stenosis     Asthma     Benign prostatic hyperplasia with nocturia     Bilateral carotid artery stenosis 7/21/2021    US CAROTID BILATERAL 07/14/2021 IMPRESSION: Atherosclerosis with suspected stenosis greater than 50% at the right proximal ICA visually. Velocities are discordant and appear improved from prior. Atherosclerosis on the left with no evidence of flow-limiting stenosis greater than 50%.  FINDINGS: Right: Internal Carotid Artery (ICA): Peak systolic velocity 118 cm/sec. End diastolic velocity 35 cm/sec    BPH (benign prostatic hyperplasia)     CAD (coronary artery disease)     40% lesion 2002;[facility]    Cirrhosis     Claudication 4/9/2014    Colon polyp     COPD (chronic obstructive pulmonary disease)     ED (erectile dysfunction)     Encounter for blood transfusion     Ex-smoker     Hearing loss NEC     Hepatitis C     Cured;yuly brownoni 2015    Hyperlipidemia     Hypertension     Hypothyroid     s/p tx graves    Open angle with borderline findings and low glaucoma risk in both eyes 9/22/2020    DELONTE on CPAP     Osteoarthritis     PVD (peripheral vascular disease)     Secondary esophageal varices without bleeding 6/26/2017    Type 2 diabetes mellitus      Social History     Socioeconomic History    Marital status:      Spouse name: YAEL    Number of children: 2   Tobacco Use    Smoking status: Former     Current packs/day: 0.00     Average packs/day: 0.5 packs/day  for 4.0 years (2.0 total pack years)     Types: Cigarettes     Start date: 1968     Quit date: 1972     Years since quittin.3    Smokeless tobacco: Former     Types: Chew     Quit date: 1976   Substance and Sexual Activity    Alcohol use: Yes     Alcohol/week: 2.0 standard drinks of alcohol     Types: 2 Cans of beer per week    Drug use: No    Sexual activity: Yes     Partners: Female   Social History Narrative    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     Current Outpatient Medications on File Prior to Visit   Medication Sig Dispense Refill    ACCU-CHEK GRACE PLUS METER Misc AS DIRECTED 1 each 0    albuterol (VENTOLIN HFA) 90 mcg/actuation inhaler Inhale 2 puffs into the lungs every 6 (six) hours as needed for Wheezing or Shortness of Breath. Rescue 18 g 3    amLODIPine (NORVASC) 10 MG tablet TAKE 1 TABLET BY MOUTH ONCE A DAY (DOSE INCREASE) 30 tablet 11    aspirin (ECOTRIN) 81 MG EC tablet TAKE 1 TABLET BY MOUTH EVERY DAY 90 tablet 4    azelastine (ASTELIN) 137 mcg (0.1 %) nasal spray 2 sprays (274 mcg total) by Nasal route 2 (two) times daily. 30 mL 6    blood sugar diagnostic (ACCU-CHEK GRACE PLUS TEST STRP) Strp TEST THREE TIMES A  strip 11    budesonide-formoterol 160-4.5 mcg (SYMBICORT) 160-4.5 mcg/actuation HFAA INHALE 2 PUFFS INTO THE LUNGS TWO TIMES A DAY. 10.2 g 11    carvediloL (COREG) 3.125 MG tablet Take 1 tablet (3.125 mg total) by mouth 2 (two) times daily with meals. 60 tablet 5    empagliflozin (JARDIANCE) 25 mg tablet Take 1 tablet (25 mg total) by mouth once daily. 90 tablet 3    famotidine (PEPCID) 40 MG tablet Take 1 tablet (40 mg total) by mouth every evening. 30 tablet 11    finasteride (PROSCAR) 5 mg tablet TAKE 1 TABLET BY MOUTH once daily 90 tablet 2    fluticasone propionate (FLONASE) 50 mcg/actuation nasal spray 2 sprays (100 mcg total) by Each  "Nostril route once daily. 18.2 mL 6    furosemide (LASIX) 40 MG tablet Take 1 tablet (40 mg total) by mouth 2 (two) times daily. 60 tablet 11    GLUCOSAMINE HCL/CHONDR ROSARIO A NA (OSTEO BI-FLEX ORAL) Take 1 tablet by mouth 2 (two) times daily.       ipratropium (ATROVENT) 21 mcg (0.03 %) nasal spray 2 sprays by Each Nostril route 2 (two) times daily. 30 mL 0    krill oil 500 mg Cap Take by mouth.      lancets (ACCU-CHEK FASTCLIX LANCET DRUM) Misc TEST TWO TIMES A  each 5    LANTUS SOLOSTAR U-100 INSULIN glargine 100 units/mL SubQ pen INJECT 44 UNITS UNDER THE SKIN EVERY EVENING 45 mL 1    levocetirizine (XYZAL) 5 MG tablet Take 1 tablet (5 mg total) by mouth every evening. 30 tablet 11    levothyroxine (SYNTHROID) 300 MCG Tab Take 1 tablet (300 mcg total) by mouth every morning. 90 tablet 3    lisinopriL 10 MG tablet TAKE 1 BY MOUTH EVERY DAY 30 tablet 7    meloxicam (MOBIC) 15 MG tablet Take 15 mg by mouth.      metFORMIN (GLUCOPHAGE) 500 MG tablet TAKE 1 TABLET BY MOUTH TWICE DAILY WITH MEALS 180 tablet 3    methylPREDNISolone (MEDROL DOSEPACK) 4 mg tablet use as directed 21 tablet 0    montelukast (SINGULAIR) 10 mg tablet TAKE 1 TABLET BY MOUTH ONCE DAILY 30 tablet 11    neomycin-polymyxin-dexamethasone (DEXACINE) 3.5 mg/g-10,000 unit/g-0.1 % Oint Place into both eyes 2 (two) times a day. Apply to lid margins 3.5 g 0    pen needle, diabetic (BD ULTRA-FINE MINI PEN NEEDLE) 31 gauge x 3/16" Ndle USE AS DIRECTED 100 each 11    propafenone (RHTHYMOL) 150 MG Tab Take 1 tablet (150 mg total) by mouth every 8 (eight) hours. 90 tablet 11    RABEprazole (ACIPHEX) 20 mg tablet Take 1 tablet (20 mg total) by mouth 2 (two) times daily. 60 tablet 11    REPATHA SURECLICK 140 mg/mL PnIj SMARTSI Milligram(s) Topical Every 2 Weeks      rivaroxaban (XARELTO) 20 mg Tab Take 1 tablet (20 mg total) by mouth daily with dinner or evening meal. 30 tablet 11    sildenafiL (VIAGRA) 100 MG tablet Take 1/2 to 1 tablet by mouth one " hour prior to intercourse. Max 100mg per day 30 tablet 11     Current Facility-Administered Medications on File Prior to Visit   Medication Dose Route Frequency Provider Last Rate Last Admin    lactated ringers infusion   Intravenous Continuous Omaira Theodore MD   New Bag at 09/09/19 1117     Review of patient's allergies indicates:   Allergen Reactions    Lipitor [atorvastatin] Other (See Comments)     Muscle aches and pains as well as fatigue.     Niacin preparations Other (See Comments)     Causes broken blood vessels and bruises at 4 times normal dose.    Statins-hmg-coa reductase inhibitors Other (See Comments)     Statins cause intolerable myalgias.    Iodinated contrast media Hives     Tachycardia    Omeprazole Hives and Itching    Prilosec [omeprazole magnesium] Hives and Itching       Objective:     Physical Exam  Vitals and nursing note reviewed.   Constitutional:       Appearance: He is well-developed.   HENT:      Head: Normocephalic and atraumatic.   Eyes:      Conjunctiva/sclera: Conjunctivae normal.      Pupils: Pupils are equal, round, and reactive to light.   Cardiovascular:      Rate and Rhythm: Normal rate and regular rhythm.      Pulses: Intact distal pulses.      Heart sounds: Normal heart sounds.   Pulmonary:      Effort: Pulmonary effort is normal.      Breath sounds: Normal breath sounds.   Abdominal:      General: Bowel sounds are normal.      Palpations: Abdomen is soft.   Musculoskeletal:      Cervical back: Normal range of motion and neck supple.   Skin:     General: Skin is warm and dry.   Neurological:      Mental Status: He is alert and oriented to person, place, and time.     Assessment:     1. Mild intermittent asthma without complication    2. PVC (premature ventricular contraction)    3. PVD (peripheral vascular disease)    4. Angina pectoris    5. Mixed hyperlipidemia    6. S/P CABG x 2    7. Aortic atherosclerosis    8. Paroxysmal atrial fibrillation    9. Essential  hypertension    10. Coronary artery disease involving native coronary artery of native heart without angina pectoris    11. Nonrheumatic aortic valve stenosis    12. Iron deficiency anemia due to chronic blood loss    13. HCC (hepatocellular carcinoma)    14. BMI 36.0-36.9,adult    15. Gastroesophageal reflux disease with esophagitis without hemorrhage    16. Secondary esophageal varices without bleeding    17. DELONTE on CPAP    18. Statin myopathy        Plan:     Mild intermittent asthma without complication    PVC (premature ventricular contraction)    PVD (peripheral vascular disease)    Angina pectoris    Mixed hyperlipidemia    S/P CABG x 2    Aortic atherosclerosis    Paroxysmal atrial fibrillation    Essential hypertension    Coronary artery disease involving native coronary artery of native heart without angina pectoris    Nonrheumatic aortic valve stenosis    Iron deficiency anemia due to chronic blood loss    HCC (hepatocellular carcinoma)    BMI 36.0-36.9,adult    Gastroesophageal reflux disease with esophagitis without hemorrhage    Secondary esophageal varices without bleeding    DELONTE on CPAP    Statin myopathy    continue coreg 1/2 tab bid- HTN  Continue lisinopril , norvasc - HTN  continue repatha - HLP  Continue xarelto, propafenone- afib

## 2023-09-25 ENCOUNTER — OFFICE VISIT (OUTPATIENT)
Dept: OPHTHALMOLOGY | Facility: CLINIC | Age: 78
End: 2023-09-25
Payer: MEDICARE

## 2023-09-25 DIAGNOSIS — H35.373 EPIRETINAL MEMBRANE (ERM) OF BOTH EYES: ICD-10-CM

## 2023-09-25 DIAGNOSIS — Z79.4 CONTROLLED TYPE 2 DIABETES MELLITUS WITHOUT COMPLICATION, WITH LONG-TERM CURRENT USE OF INSULIN: Primary | ICD-10-CM

## 2023-09-25 DIAGNOSIS — Z96.1 PSEUDOPHAKIA: ICD-10-CM

## 2023-09-25 DIAGNOSIS — E11.9 CONTROLLED TYPE 2 DIABETES MELLITUS WITHOUT COMPLICATION, WITH LONG-TERM CURRENT USE OF INSULIN: Primary | ICD-10-CM

## 2023-09-25 DIAGNOSIS — H35.363 DRUSEN OF MACULA OF BOTH EYES: ICD-10-CM

## 2023-09-25 PROCEDURE — 1157F ADVNC CARE PLAN IN RCRD: CPT | Mod: CPTII,S$GLB,, | Performed by: OPHTHALMOLOGY

## 2023-09-25 PROCEDURE — 92014 PR EYE EXAM, EST PATIENT,COMPREHESV: ICD-10-PCS | Mod: S$GLB,,, | Performed by: OPHTHALMOLOGY

## 2023-09-25 PROCEDURE — 2023F DILAT RTA XM W/O RTNOPTHY: CPT | Mod: CPTII,S$GLB,, | Performed by: OPHTHALMOLOGY

## 2023-09-25 PROCEDURE — 2023F PR DILATED RETINAL EXAM W/O EVID OF RETINOPATHY: ICD-10-PCS | Mod: CPTII,S$GLB,, | Performed by: OPHTHALMOLOGY

## 2023-09-25 PROCEDURE — 92014 COMPRE OPH EXAM EST PT 1/>: CPT | Mod: S$GLB,,, | Performed by: OPHTHALMOLOGY

## 2023-09-25 PROCEDURE — 1160F PR REVIEW ALL MEDS BY PRESCRIBER/CLIN PHARMACIST DOCUMENTED: ICD-10-PCS | Mod: CPTII,S$GLB,, | Performed by: OPHTHALMOLOGY

## 2023-09-25 PROCEDURE — 1126F PR PAIN SEVERITY QUANTIFIED, NO PAIN PRESENT: ICD-10-PCS | Mod: CPTII,S$GLB,, | Performed by: OPHTHALMOLOGY

## 2023-09-25 PROCEDURE — 99999 PR PBB SHADOW E&M-EST. PATIENT-LVL III: ICD-10-PCS | Mod: PBBFAC,,, | Performed by: OPHTHALMOLOGY

## 2023-09-25 PROCEDURE — 1126F AMNT PAIN NOTED NONE PRSNT: CPT | Mod: CPTII,S$GLB,, | Performed by: OPHTHALMOLOGY

## 2023-09-25 PROCEDURE — 1159F PR MEDICATION LIST DOCUMENTED IN MEDICAL RECORD: ICD-10-PCS | Mod: CPTII,S$GLB,, | Performed by: OPHTHALMOLOGY

## 2023-09-25 PROCEDURE — 1157F PR ADVANCE CARE PLAN OR EQUIV PRESENT IN MEDICAL RECORD: ICD-10-PCS | Mod: CPTII,S$GLB,, | Performed by: OPHTHALMOLOGY

## 2023-09-25 PROCEDURE — 99999 PR PBB SHADOW E&M-EST. PATIENT-LVL III: CPT | Mod: PBBFAC,,, | Performed by: OPHTHALMOLOGY

## 2023-09-25 PROCEDURE — 1159F MED LIST DOCD IN RCRD: CPT | Mod: CPTII,S$GLB,, | Performed by: OPHTHALMOLOGY

## 2023-09-25 PROCEDURE — 92134 POSTERIOR SEGMENT OCT RETINA (OCULAR COHERENCE TOMOGRAPHY)-BOTH EYES: ICD-10-PCS | Mod: S$GLB,,, | Performed by: OPHTHALMOLOGY

## 2023-09-25 PROCEDURE — 1160F RVW MEDS BY RX/DR IN RCRD: CPT | Mod: CPTII,S$GLB,, | Performed by: OPHTHALMOLOGY

## 2023-09-25 PROCEDURE — 92134 CPTRZ OPH DX IMG PST SGM RTA: CPT | Mod: S$GLB,,, | Performed by: OPHTHALMOLOGY

## 2023-09-25 NOTE — PROGRESS NOTES
===============================  Date today is 9/25/2023  Erendira Pretty is a 78 y.o. male  Last visit [facility]: :9/6/2022   Last visit eye dept. Visit date not found    Uncorrected distance visual acuity was 20/25 in the right eye and 20/30 in the left eye.  Tonometry       Tonometry (Applanation, 8:05 AM)         Right Left    Pressure 16 16                  Not recorded       Not recorded       Not recorded       Chief Complaint   Patient presents with    Diabetic Eye Exam     Yearly dm exam     HPI     Diabetic Eye Exam     Additional comments: Yearly dm exam           Comments    + DIET CONTROLLED DM  MINI ERM OU  DRY EYES  ON RESTASIS  OU LL PLUGS  YAG OS3/21/2022          Last edited by Madeline Cash on 9/25/2023  7:56 AM.      Problem List Items Addressed This Visit          Eye/Vision problems    Epiretinal membrane - Both Eyes    Relevant Orders    Posterior Segment OCT Retina-Both eyes (Completed)    Pseudophakia     Other Visit Diagnoses       Controlled type 2 diabetes mellitus without complication, with long-term current use of insulin    -  Primary    Relevant Orders    Posterior Segment OCT Retina-Both eyes (Completed)    Drusen of macula of both eyes        Relevant Orders    Posterior Segment OCT Retina-Both eyes (Completed)          Instructed to call 24/7 for any worsening of vision, visual distortion or pain.  Check OU independently daily.    Gave my office and personal cell phone number.  ________________  9/25/2023 today  Erendira Pretty    :  DM no retinopathy  Oct looks good, no DME but +media snow  Va good  C/o episodic OD ephiphora  PCIOL OU post YAG OU  2+ SPK OD  Palpebral conj shows inflamed calcium spots OD- previous removal did not help  Minimal ERM OU  Few tight drusen OD  Macula looks good OU    RTC 1 year  Instructed to call 24/7 for any worsening of vision or symptoms. Check OU daily.   Gave my office and cell phone number.    =============================

## 2023-09-27 DIAGNOSIS — E11.29 TYPE 2 DIABETES MELLITUS WITH MICROALBUMINURIA, WITH LONG-TERM CURRENT USE OF INSULIN: Chronic | ICD-10-CM

## 2023-09-27 DIAGNOSIS — Z79.4 TYPE 2 DIABETES MELLITUS WITH MICROALBUMINURIA, WITH LONG-TERM CURRENT USE OF INSULIN: Chronic | ICD-10-CM

## 2023-09-27 DIAGNOSIS — R80.9 TYPE 2 DIABETES MELLITUS WITH MICROALBUMINURIA, WITH LONG-TERM CURRENT USE OF INSULIN: Chronic | ICD-10-CM

## 2023-09-27 RX ORDER — METFORMIN HYDROCHLORIDE 500 MG/1
TABLET ORAL
Qty: 180 TABLET | Refills: 0 | Status: SHIPPED | OUTPATIENT
Start: 2023-09-27 | End: 2023-10-16

## 2023-09-27 NOTE — TELEPHONE ENCOUNTER
Refill Decision Note   Erendira Pretty  is requesting a refill authorization.  Brief Assessment and Rationale for Refill:  Approve     Medication Therapy Plan:         Pharmacist review requested: Yes   Extended chart review required: Yes   Comments:     Note composed:3:19 PM 09/27/2023

## 2023-09-27 NOTE — TELEPHONE ENCOUNTER
Refill Routing Note   Medication(s) are not appropriate for processing by [facility] for the following reason(s):      Drug-disease interaction    [facility] action(s):  Defer None identified     Medication Therapy Plan: Drug-Disease: metFORMIN and HCC (hepatocellular carcinoma); Other cirrhosis of liver  Medication reconciliation completed: No     Appointments  past 12m or future 3m with PCP    Date Provider   Last Visit   8/21/2023 Bhupinder Callaway MD   Next Visit   Visit date not found Bhupinder Callaway MD   ED visits in past 90 days: 0        Note composed:9:50 AM 09/27/2023

## 2023-09-27 NOTE — TELEPHONE ENCOUNTER
No care due was identified.  [facility] Embedded Care Due Messages. Reference number: [id number].   9/27/2023 9:40:42 AM CDT

## 2023-10-09 ENCOUNTER — LAB VISIT (OUTPATIENT)
Dept: LAB | Facility: HOSPITAL | Age: 78
End: 2023-10-09
Attending: PEDIATRICS
Payer: MEDICARE

## 2023-10-09 DIAGNOSIS — Z12.5 PROSTATE CANCER SCREENING: ICD-10-CM

## 2023-10-09 DIAGNOSIS — E03.9 ACQUIRED HYPOTHYROIDISM: Chronic | ICD-10-CM

## 2023-10-09 DIAGNOSIS — Z79.4 TYPE 2 DIABETES MELLITUS WITH MICROALBUMINURIA, WITH LONG-TERM CURRENT USE OF INSULIN: Chronic | ICD-10-CM

## 2023-10-09 DIAGNOSIS — K21.9 GASTROESOPHAGEAL REFLUX DISEASE WITHOUT ESOPHAGITIS: ICD-10-CM

## 2023-10-09 DIAGNOSIS — R80.9 TYPE 2 DIABETES MELLITUS WITH MICROALBUMINURIA, WITH LONG-TERM CURRENT USE OF INSULIN: Chronic | ICD-10-CM

## 2023-10-09 DIAGNOSIS — E11.29 TYPE 2 DIABETES MELLITUS WITH MICROALBUMINURIA, WITH LONG-TERM CURRENT USE OF INSULIN: Chronic | ICD-10-CM

## 2023-10-09 LAB
ALBUMIN SERPL BCP-MCNC: 3.1 G/DL (ref 3.5–5.2)
ALP SERPL-CCNC: 140 U/L (ref 55–135)
ALT SERPL W/O P-5'-P-CCNC: 29 U/L (ref 10–44)
ANION GAP SERPL CALC-SCNC: 9 MMOL/L (ref 8–16)
AST SERPL-CCNC: 28 U/L (ref 10–40)
BILIRUB SERPL-MCNC: 0.5 MG/DL (ref 0.1–1)
BUN SERPL-MCNC: 10 MG/DL (ref 8–23)
CALCIUM SERPL-MCNC: 9.1 MG/DL (ref 8.7–10.5)
CHLORIDE SERPL-SCNC: 107 MMOL/L (ref 95–110)
CHOLEST SERPL-MCNC: 90 MG/DL (ref 120–199)
CHOLEST/HDLC SERPL: 2.4 {RATIO} (ref 2–5)
CO2 SERPL-SCNC: 25 MMOL/L (ref 23–29)
COMPLEXED PSA SERPL-MCNC: 0.17 NG/ML (ref 0–4)
CREAT SERPL-MCNC: 0.9 MG/DL (ref 0.5–1.4)
EST. GFR  (NO RACE VARIABLE): >60 ML/MIN/1.73 M^2
ESTIMATED AVG GLUCOSE: 134 MG/DL (ref 68–131)
GLUCOSE SERPL-MCNC: 120 MG/DL (ref 70–110)
HBA1C MFR BLD: 6.3 % (ref 4–5.6)
HDLC SERPL-MCNC: 37 MG/DL (ref 40–75)
HDLC SERPL: 41.1 % (ref 20–50)
LDLC SERPL CALC-MCNC: 36 MG/DL (ref 63–159)
NONHDLC SERPL-MCNC: 53 MG/DL
POTASSIUM SERPL-SCNC: 4.2 MMOL/L (ref 3.5–5.1)
PROT SERPL-MCNC: 7.4 G/DL (ref 6–8.4)
SODIUM SERPL-SCNC: 141 MMOL/L (ref 136–145)
T4 FREE SERPL-MCNC: 0.96 NG/DL (ref 0.71–1.51)
TRIGL SERPL-MCNC: 85 MG/DL (ref 30–150)
TSH SERPL DL<=0.005 MIU/L-ACNC: 4.7 UIU/ML (ref 0.4–4)

## 2023-10-09 PROCEDURE — 80053 COMPREHEN METABOLIC PANEL: CPT | Performed by: PEDIATRICS

## 2023-10-09 PROCEDURE — 84443 ASSAY THYROID STIM HORMONE: CPT | Performed by: PEDIATRICS

## 2023-10-09 PROCEDURE — 84439 ASSAY OF FREE THYROXINE: CPT | Performed by: PEDIATRICS

## 2023-10-09 PROCEDURE — 84153 ASSAY OF PSA TOTAL: CPT | Performed by: PEDIATRICS

## 2023-10-09 PROCEDURE — 36415 COLL VENOUS BLD VENIPUNCTURE: CPT | Mod: PO | Performed by: PEDIATRICS

## 2023-10-09 PROCEDURE — 80061 LIPID PANEL: CPT | Performed by: PEDIATRICS

## 2023-10-09 PROCEDURE — 83036 HEMOGLOBIN GLYCOSYLATED A1C: CPT | Performed by: PEDIATRICS

## 2023-10-09 RX ORDER — FUROSEMIDE 40 MG/1
40 TABLET ORAL 2 TIMES DAILY
Qty: 60 TABLET | Refills: 11 | Status: SHIPPED | OUTPATIENT
Start: 2023-10-09 | End: 2024-10-08

## 2023-10-09 NOTE — TELEPHONE ENCOUNTER
No care due was identified.  [facility] Embedded Care Due Messages. Reference number: [id number].   10/09/2023 2:31:39 PM CDT

## 2023-10-09 NOTE — TELEPHONE ENCOUNTER
Refill Routing Note   Medication(s) are not appropriate for processing by [facility] for the following reason(s):      Drug-disease interaction  Allergy or intolerance    [facility] action(s):  Defer Care Due:  None identified     Medication Therapy Plan: Allergy/Contraindication: RABEprazole; Drug-Disease: RABEprazole and HCC (hepatocellular carcinoma); Other cirrhosis of liver      Appointments  past 12m or future 3m with PCP    Date Provider   Last Visit   8/21/2023 Bhupinder Callaway MD   Next Visit   Visit date not found Bhupinder Callaway MD   ED visits in past 90 days: 0        Note composed:4:54 PM 10/09/2023

## 2023-10-10 RX ORDER — RABEPRAZOLE SODIUM 20 MG/1
20 TABLET, DELAYED RELEASE ORAL 2 TIMES DAILY
Qty: 180 TABLET | Refills: 3 | Status: SHIPPED | OUTPATIENT
Start: 2023-10-10

## 2023-10-14 DIAGNOSIS — K74.60 CIRRHOSIS OF LIVER WITHOUT ASCITES, UNSPECIFIED HEPATIC CIRRHOSIS TYPE: ICD-10-CM

## 2023-10-16 ENCOUNTER — OFFICE VISIT (OUTPATIENT)
Dept: INTERNAL MEDICINE | Facility: CLINIC | Age: 78
End: 2023-10-16
Payer: MEDICARE

## 2023-10-16 VITALS
SYSTOLIC BLOOD PRESSURE: 128 MMHG | BODY MASS INDEX: 35.61 KG/M2 | OXYGEN SATURATION: 99 % | WEIGHT: 226.88 LBS | HEART RATE: 72 BPM | HEIGHT: 67 IN | DIASTOLIC BLOOD PRESSURE: 76 MMHG | TEMPERATURE: 97 F

## 2023-10-16 DIAGNOSIS — E03.9 ACQUIRED HYPOTHYROIDISM: Chronic | ICD-10-CM

## 2023-10-16 DIAGNOSIS — T46.6X5A STATIN MYOPATHY: ICD-10-CM

## 2023-10-16 DIAGNOSIS — C22.0 HCC (HEPATOCELLULAR CARCINOMA): ICD-10-CM

## 2023-10-16 DIAGNOSIS — G47.33 OSA ON CPAP: Chronic | ICD-10-CM

## 2023-10-16 DIAGNOSIS — Z79.4 TYPE 2 DIABETES MELLITUS WITH MICROALBUMINURIA, WITH LONG-TERM CURRENT USE OF INSULIN: Chronic | ICD-10-CM

## 2023-10-16 DIAGNOSIS — I25.10 CORONARY ARTERY DISEASE INVOLVING NATIVE CORONARY ARTERY OF NATIVE HEART WITHOUT ANGINA PECTORIS: Chronic | ICD-10-CM

## 2023-10-16 DIAGNOSIS — D50.0 IRON DEFICIENCY ANEMIA DUE TO CHRONIC BLOOD LOSS: ICD-10-CM

## 2023-10-16 DIAGNOSIS — I10 ESSENTIAL HYPERTENSION: Primary | Chronic | ICD-10-CM

## 2023-10-16 DIAGNOSIS — E11.29 TYPE 2 DIABETES MELLITUS WITH MICROALBUMINURIA, WITH LONG-TERM CURRENT USE OF INSULIN: Chronic | ICD-10-CM

## 2023-10-16 DIAGNOSIS — G72.0 STATIN MYOPATHY: ICD-10-CM

## 2023-10-16 DIAGNOSIS — R80.9 TYPE 2 DIABETES MELLITUS WITH MICROALBUMINURIA, WITH LONG-TERM CURRENT USE OF INSULIN: Chronic | ICD-10-CM

## 2023-10-16 PROCEDURE — 1101F PT FALLS ASSESS-DOCD LE1/YR: CPT | Mod: CPTII,S$GLB,, | Performed by: NURSE PRACTITIONER

## 2023-10-16 PROCEDURE — 1157F PR ADVANCE CARE PLAN OR EQUIV PRESENT IN MEDICAL RECORD: ICD-10-PCS | Mod: CPTII,S$GLB,, | Performed by: NURSE PRACTITIONER

## 2023-10-16 PROCEDURE — 99999 PR PBB SHADOW E&M-EST. PATIENT-LVL V: ICD-10-PCS | Mod: PBBFAC,,, | Performed by: NURSE PRACTITIONER

## 2023-10-16 PROCEDURE — 3074F PR MOST RECENT SYSTOLIC BLOOD PRESSURE < 130 MM HG: ICD-10-PCS | Mod: CPTII,S$GLB,, | Performed by: NURSE PRACTITIONER

## 2023-10-16 PROCEDURE — 3288F PR FALLS RISK ASSESSMENT DOCUMENTED: ICD-10-PCS | Mod: CPTII,S$GLB,, | Performed by: NURSE PRACTITIONER

## 2023-10-16 PROCEDURE — 1157F ADVNC CARE PLAN IN RCRD: CPT | Mod: CPTII,S$GLB,, | Performed by: NURSE PRACTITIONER

## 2023-10-16 PROCEDURE — 1159F PR MEDICATION LIST DOCUMENTED IN MEDICAL RECORD: ICD-10-PCS | Mod: CPTII,S$GLB,, | Performed by: NURSE PRACTITIONER

## 2023-10-16 PROCEDURE — 1159F MED LIST DOCD IN RCRD: CPT | Mod: CPTII,S$GLB,, | Performed by: NURSE PRACTITIONER

## 2023-10-16 PROCEDURE — 3078F DIAST BP <80 MM HG: CPT | Mod: CPTII,S$GLB,, | Performed by: NURSE PRACTITIONER

## 2023-10-16 PROCEDURE — 99214 OFFICE O/P EST MOD 30 MIN: CPT | Mod: S$GLB,,, | Performed by: NURSE PRACTITIONER

## 2023-10-16 PROCEDURE — 99999 PR PBB SHADOW E&M-EST. PATIENT-LVL V: CPT | Mod: PBBFAC,,, | Performed by: NURSE PRACTITIONER

## 2023-10-16 PROCEDURE — 1126F AMNT PAIN NOTED NONE PRSNT: CPT | Mod: CPTII,S$GLB,, | Performed by: NURSE PRACTITIONER

## 2023-10-16 PROCEDURE — 3288F FALL RISK ASSESSMENT DOCD: CPT | Mod: CPTII,S$GLB,, | Performed by: NURSE PRACTITIONER

## 2023-10-16 PROCEDURE — 99214 PR OFFICE/OUTPT VISIT, EST, LEVL IV, 30-39 MIN: ICD-10-PCS | Mod: S$GLB,,, | Performed by: NURSE PRACTITIONER

## 2023-10-16 PROCEDURE — 1126F PR PAIN SEVERITY QUANTIFIED, NO PAIN PRESENT: ICD-10-PCS | Mod: CPTII,S$GLB,, | Performed by: NURSE PRACTITIONER

## 2023-10-16 PROCEDURE — 1101F PR PT FALLS ASSESS DOC 0-1 FALLS W/OUT INJ PAST YR: ICD-10-PCS | Mod: CPTII,S$GLB,, | Performed by: NURSE PRACTITIONER

## 2023-10-16 PROCEDURE — 3074F SYST BP LT 130 MM HG: CPT | Mod: CPTII,S$GLB,, | Performed by: NURSE PRACTITIONER

## 2023-10-16 PROCEDURE — 3078F PR MOST RECENT DIASTOLIC BLOOD PRESSURE < 80 MM HG: ICD-10-PCS | Mod: CPTII,S$GLB,, | Performed by: NURSE PRACTITIONER

## 2023-10-16 RX ORDER — FAMOTIDINE 40 MG/1
40 TABLET, FILM COATED ORAL NIGHTLY
Qty: 30 TABLET | Refills: 11 | Status: ON HOLD | OUTPATIENT
Start: 2023-10-16 | End: 2024-01-26 | Stop reason: HOSPADM

## 2023-10-16 RX ORDER — METFORMIN HYDROCHLORIDE 500 MG/1
500 TABLET, EXTENDED RELEASE ORAL 2 TIMES DAILY WITH MEALS
Qty: 180 TABLET | Refills: 3 | Status: SHIPPED | OUTPATIENT
Start: 2023-10-16 | End: 2024-10-15

## 2023-10-16 NOTE — PROGRESS NOTES
Subjective:       Patient ID: Erendira Pretty is a 78 y.o. male.    Chief Complaint: Abdominal Pain and Follow-up    Abdominal Pain  Associated symptoms include diarrhea. Pertinent negatives include no arthralgias, constipation, dysuria, fever, frequency, headaches, hematuria, nausea or vomiting.   Follow-up  Associated symptoms include abdominal pain. Pertinent negatives include no arthralgias, chest pain, chills, congestion, coughing, diaphoresis, fatigue, fever, headaches, joint swelling, nausea, neck pain, numbness, sore throat, vomiting or weakness.         He reports abd cramping and diarrhea in am for > 6 mo sometimes in evening.       ) DM: no hyper/hypoglycemic symptoms. Infrequent checks at home, taking Jardiance 25 mg, 44u Lantus, and Metformin BID.   2) HTN: rare BP checks, B/P good, no HTNive symptoms. On beta blocker for esophogeal varicies  3) LIPIDS: following D&E, statin intolerant/myopathy. stopped Zetia and Fenofibrate by Cards due to fatigue and myalgia. On repatha, forgets to take.  4) CAD/Atherosclerosis: no CP, active and sees Dr. Gallardo  5) Hypothyroid: asymptomatic, no swallowing difficulties, no hyper/hypothyroid symptoms.   6) Hepatocellular carcinoma/cirrhosis: followed by Dr. Tomas  7) Asthma/DELONTE/asbestos/obesity: followed by Pulmonary, off ICS due to ENT findings  8)GERD: quiet on PPI  9)BPH: symptoms ok on proscar                       Review of Systems   Constitutional:  Negative for activity change, appetite change, chills, diaphoresis, fatigue, fever and unexpected weight change.   HENT:  Negative for congestion, ear pain, postnasal drip, rhinorrhea, sinus pressure, sinus pain, sneezing, sore throat, tinnitus, trouble swallowing and voice change.    Eyes:  Negative for photophobia, pain and visual disturbance.   Respiratory:  Negative for cough, chest tightness, shortness of breath and wheezing.    Cardiovascular:  Negative for chest pain, palpitations and leg swelling.    Gastrointestinal:  Positive for abdominal pain and diarrhea. Negative for abdominal distention, constipation, nausea and vomiting.   Genitourinary:  Negative for decreased urine volume, difficulty urinating, dysuria, flank pain, frequency, hematuria and urgency.   Musculoskeletal:  Negative for arthralgias, back pain, joint swelling, neck pain and neck stiffness.   Allergic/Immunologic: Negative for immunocompromised state.   Neurological:  Negative for dizziness, tremors, seizures, syncope, facial asymmetry, speech difficulty, weakness, light-headedness, numbness and headaches.   Hematological:  Negative for adenopathy. Does not bruise/bleed easily.   Psychiatric/Behavioral:  Negative for confusion and sleep disturbance.        Objective:      Physical Exam  Vitals reviewed.   Constitutional:       Appearance: He is obese.   HENT:      Head: Normocephalic and atraumatic.      Right Ear: Tympanic membrane normal.      Left Ear: Tympanic membrane normal.   Eyes:      Conjunctiva/sclera: Conjunctivae normal.   Cardiovascular:      Rate and Rhythm: Normal rate and regular rhythm.      Heart sounds: Normal heart sounds.   Pulmonary:      Effort: Pulmonary effort is normal.      Breath sounds: Normal breath sounds.   Abdominal:      General: Bowel sounds are normal.      Palpations: Abdomen is soft.   Musculoskeletal:         General: Normal range of motion.      Cervical back: Normal range of motion and neck supple.   Skin:     General: Skin is warm and dry.   Neurological:      Mental Status: He is alert and oriented to person, place, and time.         Assessment:     Vitals:    10/16/23 0820   BP: 128/76   Pulse: 72   Temp: 97.2 °F (36.2 °C)         1. Essential hypertension    2. Coronary artery disease involving native coronary artery of native heart without angina pectoris    3. HCC (hepatocellular carcinoma)    4. Iron deficiency anemia due to chronic blood loss    5. Type 2 diabetes mellitus with  microalbuminuria, with long-term current use of insulin    6. Acquired hypothyroidism    7. DELONTE on CPAP    8. Statin myopathy        Plan:   Essential hypertension  -     Comprehensive Metabolic Panel; Future; Expected date: 04/13/2024  -     TSH; Future; Expected date: 04/13/2024  -     CBC Auto Differential; Future; Expected date: 04/13/2024  -     Lipid Panel; Future; Expected date: 04/13/2024    Coronary artery disease involving native coronary artery of native heart without angina pectoris    HCC (hepatocellular carcinoma)    Iron deficiency anemia due to chronic blood loss    Type 2 diabetes mellitus with microalbuminuria, with long-term current use of insulin  -     Hemoglobin A1C; Future; Expected date: 04/13/2024    Acquired hypothyroidism  -     TSH; Future; Expected date: 04/13/2024    DELONTE on CPAP    Statin myopathy    Other orders  -     metFORMIN (GLUCOPHAGE-XR) 500 MG ER 24hr tablet; Take 1 tablet (500 mg total) by mouth 2 (two) times daily with meals.  Dispense: 180 tablet; Refill: 3        Change metformin to XR to see if it helps with GI symptoms  He is stable other wise  6 mo

## 2023-10-23 DIAGNOSIS — E11.22 TYPE 2 DIABETES MELLITUS WITH CHRONIC KIDNEY DISEASE, WITH LONG-TERM CURRENT USE OF INSULIN, UNSPECIFIED CKD STAGE: Chronic | ICD-10-CM

## 2023-10-23 DIAGNOSIS — Z79.4 TYPE 2 DIABETES MELLITUS WITH CHRONIC KIDNEY DISEASE, WITH LONG-TERM CURRENT USE OF INSULIN, UNSPECIFIED CKD STAGE: Chronic | ICD-10-CM

## 2023-10-23 NOTE — TELEPHONE ENCOUNTER
No care due was identified.  [facility] Embedded Care Due Messages. Reference number: [id number].   10/23/2023 10:11:39 AM CDT

## 2023-10-23 NOTE — TELEPHONE ENCOUNTER
Refill Routing Note   Medication(s) are not appropriate for processing by [facility] for the following reason(s):      Drug-disease interaction: empagliflozin and Type 2 diabetes mellitus with chronic kidney disease, with long-term current use of insulin, unspecified CKD stage    [facility] action(s):  Defer Care Due:  None identified            Pharmacist review requested: Yes     Appointments  past 12m or future 3m with PCP    Date Provider   Last Visit   8/21/2023 Bhupinder Callaway MD   Next Visit   4/16/2024 Bhupinder Callaway MD   ED visits in past 90 days: 0        Note composed:11:27 AM 10/23/2023

## 2023-10-23 NOTE — TELEPHONE ENCOUNTER
Refill Authorization Note   Erendira Pretty  is requesting a refill authorization.  Brief Assessment and Rationale for Refill:  Approve     Medication Therapy Plan:       Medication reconciliation completed: No   Extended chart review required: Yes  Alert overridden per protocol: No          Comments:     No Care Gaps recommended.     Note composed:11:35 AM 10/23/2023

## 2023-10-23 NOTE — TELEPHONE ENCOUNTER
----- Message from Lissett Harris sent at 10/23/2023  8:20 AM CDT -----  Contact: Erendira  .Type:  RX Refill Request    Who Called: Erendira   Refill or New Rx:refill   RX Name and Strength:empagliflozin (JARDIANCE) 25 mg tablet  How is the patient currently taking it? (ex. 1XDay):as prescribed   Is this a 30 day or 90 day RX:30 days   Preferred Pharmacy with phone number:.[phone]   Local or Mail Order:local   Ordering Provider:Dr. Callaway   Would the patient rather a call back or a response via [facility]? Call   Best Call Back Number:.[phone]   Additional Information: Patient requesting medication. Patient is stating medication wasn't on medication list and was stating the medication being  [facility]

## 2023-11-09 ENCOUNTER — OFFICE VISIT (OUTPATIENT)
Dept: CARDIOLOGY | Facility: CLINIC | Age: 78
End: 2023-11-09
Payer: MEDICARE

## 2023-11-09 VITALS
HEART RATE: 70 BPM | WEIGHT: 226.88 LBS | DIASTOLIC BLOOD PRESSURE: 70 MMHG | OXYGEN SATURATION: 98 % | HEIGHT: 67 IN | BODY MASS INDEX: 35.61 KG/M2 | SYSTOLIC BLOOD PRESSURE: 120 MMHG

## 2023-11-09 DIAGNOSIS — I48.0 PAROXYSMAL ATRIAL FIBRILLATION: ICD-10-CM

## 2023-11-09 DIAGNOSIS — R80.9 TYPE 2 DIABETES MELLITUS WITH MICROALBUMINURIA, WITH LONG-TERM CURRENT USE OF INSULIN: Chronic | ICD-10-CM

## 2023-11-09 DIAGNOSIS — Z95.1 S/P CABG X 2: ICD-10-CM

## 2023-11-09 DIAGNOSIS — I20.9 ANGINA PECTORIS: ICD-10-CM

## 2023-11-09 DIAGNOSIS — I65.23 BILATERAL CAROTID ARTERY STENOSIS: Chronic | ICD-10-CM

## 2023-11-09 DIAGNOSIS — Z79.4 TYPE 2 DIABETES MELLITUS WITH MICROALBUMINURIA, WITH LONG-TERM CURRENT USE OF INSULIN: Chronic | ICD-10-CM

## 2023-11-09 DIAGNOSIS — C22.0 HCC (HEPATOCELLULAR CARCINOMA): ICD-10-CM

## 2023-11-09 DIAGNOSIS — E11.29 TYPE 2 DIABETES MELLITUS WITH MICROALBUMINURIA, WITH LONG-TERM CURRENT USE OF INSULIN: Chronic | ICD-10-CM

## 2023-11-09 DIAGNOSIS — Z66 DNR (DO NOT RESUSCITATE): ICD-10-CM

## 2023-11-09 DIAGNOSIS — I25.10 CORONARY ARTERY DISEASE INVOLVING NATIVE CORONARY ARTERY OF NATIVE HEART WITHOUT ANGINA PECTORIS: Chronic | ICD-10-CM

## 2023-11-09 DIAGNOSIS — D50.0 IRON DEFICIENCY ANEMIA DUE TO CHRONIC BLOOD LOSS: ICD-10-CM

## 2023-11-09 DIAGNOSIS — I10 ESSENTIAL HYPERTENSION: Chronic | ICD-10-CM

## 2023-11-09 DIAGNOSIS — I49.3 PVC (PREMATURE VENTRICULAR CONTRACTION): Primary | ICD-10-CM

## 2023-11-09 DIAGNOSIS — I35.0 NONRHEUMATIC AORTIC VALVE STENOSIS: Chronic | ICD-10-CM

## 2023-11-09 DIAGNOSIS — G47.33 OSA ON CPAP: Chronic | ICD-10-CM

## 2023-11-09 DIAGNOSIS — I25.10 LEFT MAIN CORONARY ARTERY DISEASE: ICD-10-CM

## 2023-11-09 DIAGNOSIS — I70.0 AORTIC ATHEROSCLEROSIS: ICD-10-CM

## 2023-11-09 PROCEDURE — 99999 PR PBB SHADOW E&M-EST. PATIENT-LVL V: CPT | Mod: PBBFAC,,, | Performed by: INTERNAL MEDICINE

## 2023-11-09 PROCEDURE — 1157F PR ADVANCE CARE PLAN OR EQUIV PRESENT IN MEDICAL RECORD: ICD-10-PCS | Mod: CPTII,S$GLB,, | Performed by: INTERNAL MEDICINE

## 2023-11-09 PROCEDURE — 3074F PR MOST RECENT SYSTOLIC BLOOD PRESSURE < 130 MM HG: ICD-10-PCS | Mod: CPTII,S$GLB,, | Performed by: INTERNAL MEDICINE

## 2023-11-09 PROCEDURE — 99999 PR PBB SHADOW E&M-EST. PATIENT-LVL V: ICD-10-PCS | Mod: PBBFAC,,, | Performed by: INTERNAL MEDICINE

## 2023-11-09 PROCEDURE — 1157F ADVNC CARE PLAN IN RCRD: CPT | Mod: CPTII,S$GLB,, | Performed by: INTERNAL MEDICINE

## 2023-11-09 PROCEDURE — 3288F FALL RISK ASSESSMENT DOCD: CPT | Mod: CPTII,S$GLB,, | Performed by: INTERNAL MEDICINE

## 2023-11-09 PROCEDURE — 99214 PR OFFICE/OUTPT VISIT, EST, LEVL IV, 30-39 MIN: ICD-10-PCS | Mod: S$GLB,,, | Performed by: INTERNAL MEDICINE

## 2023-11-09 PROCEDURE — 1101F PR PT FALLS ASSESS DOC 0-1 FALLS W/OUT INJ PAST YR: ICD-10-PCS | Mod: CPTII,S$GLB,, | Performed by: INTERNAL MEDICINE

## 2023-11-09 PROCEDURE — 3078F DIAST BP <80 MM HG: CPT | Mod: CPTII,S$GLB,, | Performed by: INTERNAL MEDICINE

## 2023-11-09 PROCEDURE — 3288F PR FALLS RISK ASSESSMENT DOCUMENTED: ICD-10-PCS | Mod: CPTII,S$GLB,, | Performed by: INTERNAL MEDICINE

## 2023-11-09 PROCEDURE — 1126F AMNT PAIN NOTED NONE PRSNT: CPT | Mod: CPTII,S$GLB,, | Performed by: INTERNAL MEDICINE

## 2023-11-09 PROCEDURE — 1126F PR PAIN SEVERITY QUANTIFIED, NO PAIN PRESENT: ICD-10-PCS | Mod: CPTII,S$GLB,, | Performed by: INTERNAL MEDICINE

## 2023-11-09 PROCEDURE — 1101F PT FALLS ASSESS-DOCD LE1/YR: CPT | Mod: CPTII,S$GLB,, | Performed by: INTERNAL MEDICINE

## 2023-11-09 PROCEDURE — 99214 OFFICE O/P EST MOD 30 MIN: CPT | Mod: S$GLB,,, | Performed by: INTERNAL MEDICINE

## 2023-11-09 PROCEDURE — 3078F PR MOST RECENT DIASTOLIC BLOOD PRESSURE < 80 MM HG: ICD-10-PCS | Mod: CPTII,S$GLB,, | Performed by: INTERNAL MEDICINE

## 2023-11-09 PROCEDURE — 3074F SYST BP LT 130 MM HG: CPT | Mod: CPTII,S$GLB,, | Performed by: INTERNAL MEDICINE

## 2023-11-09 NOTE — PROGRESS NOTES
Subjective:   Patient ID:  Erendira Pretty is a 78 y.o. male who presents for follow-up of No chief complaint on file.  Last clinic note:  Today:  Holter- afib w/ PVCs  Elquis started but pt states muscle pain  Patient denies CP, angina or anginal equivalent or palpitations   xarelto started in place of eliquis  Propafenone started also but only taking bid    Hypertension  This is a chronic problem. The current episode started more than 1 year ago. The problem has been gradually improving since onset. The problem is controlled. Pertinent negatives include no chest pain, palpitations or shortness of breath. Past treatments include ACE inhibitors, beta blockers and calcium channel blockers. The current treatment provides moderate improvement. There are no compliance problems.  Hypertensive end-organ damage includes CAD/MI.   Coronary Artery Disease  Presents for follow-up visit. Pertinent negatives include no chest pain, chest pressure, chest tightness, dizziness, leg swelling, muscle weakness, palpitations, shortness of breath or weight gain. The symptoms have been stable. Compliance with diet is variable. Compliance with exercise is variable. Compliance with medications is good.   Hyperlipidemia  This is a chronic problem. The current episode started more than 1 year ago. The problem is controlled. Pertinent negatives include no chest pain or shortness of breath. Treatments tried: repatha. The current treatment provides moderate improvement of lipids. There are no compliance problems.        Review of Systems   Constitutional: Negative. Negative for weight gain.   HENT: Negative.     Eyes: Negative.    Cardiovascular: Negative.  Negative for chest pain, leg swelling and palpitations.   Respiratory: Negative.  Negative for chest tightness and shortness of breath.    Endocrine: Negative.    Hematologic/Lymphatic: Negative.    Skin: Negative.    Musculoskeletal:  Negative for muscle weakness.   Gastrointestinal:  Negative.    Genitourinary: Negative.    Neurological: Negative.  Negative for dizziness.   Psychiatric/Behavioral: Negative.     Allergic/Immunologic: Negative.    All other systems reviewed and are negative.    Family History   Problem Relation Age of Onset    Heart disease Mother     Heart disease Father     Heart disease Brother     Colon cancer Neg Hx      Past Medical History:   Diagnosis Date    Aortic stenosis     Asthma     Benign prostatic hyperplasia with nocturia     Bilateral carotid artery stenosis 7/21/2021     CAROTID BILATERAL 07/14/2021 IMPRESSION: Atherosclerosis with suspected stenosis greater than 50% at the right proximal ICA visually. Velocities are discordant and appear improved from prior. Atherosclerosis on the left with no evidence of flow-limiting stenosis greater than 50%.  FINDINGS: Right: Internal Carotid Artery (ICA): Peak systolic velocity 118 cm/sec. End diastolic velocity 35 cm/sec    BPH (benign prostatic hyperplasia)     CAD (coronary artery disease)     40% lesion 2002;[facility]    Cirrhosis     Claudication 4/9/2014    Colon polyp     COPD (chronic obstructive pulmonary disease)     ED (erectile dysfunction)     Encounter for blood transfusion     Ex-smoker     Hearing loss NEC     Hepatitis C     Cured;dr gibbs harvoni 2015    Hyperlipidemia     Hypertension     Hypothyroid     s/p tx graves    Open angle with borderline findings and low glaucoma risk in both eyes 9/22/2020    DELONTE on CPAP     Osteoarthritis     PVD (peripheral vascular disease)     Secondary esophageal varices without bleeding 6/26/2017    Type 2 diabetes mellitus      Social History     Socioeconomic History    Marital status:      Spouse name: YAEL    Number of children: 2   Tobacco Use    Smoking status: Former     Current packs/day: 0.00     Average packs/day: 0.5 packs/day for 4.0 years (2.0 total pack years)     Types: Cigarettes     Start date: 6/12/1968     Quit date: 6/12/1972     Years  since quittin.4    Smokeless tobacco: Former     Types: Chew     Quit date: 1976   Substance and Sexual Activity    Alcohol use: Yes     Alcohol/week: 2.0 standard drinks of alcohol     Types: 2 Cans of beer per week    Drug use: No    Sexual activity: Yes     Partners: Female   Social History Narrative    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     Current Outpatient Medications on File Prior to Visit   Medication Sig Dispense Refill    ACCU-CHEK GRACE PLUS METER Misc AS DIRECTED 1 each 0    albuterol (VENTOLIN HFA) 90 mcg/actuation inhaler Inhale 2 puffs into the lungs every 6 (six) hours as needed for Wheezing or Shortness of Breath. Rescue 18 g 3    amLODIPine (NORVASC) 10 MG tablet TAKE 1 TABLET BY MOUTH ONCE A DAY (DOSE INCREASE) 30 tablet 11    aspirin (ECOTRIN) 81 MG EC tablet TAKE 1 TABLET BY MOUTH EVERY DAY 90 tablet 4    azelastine (ASTELIN) 137 mcg (0.1 %) nasal spray 2 sprays (274 mcg total) by Nasal route 2 (two) times daily. 30 mL 6    blood sugar diagnostic (ACCU-CHEK GRACE PLUS TEST STRP) Strp TEST THREE TIMES A  strip 11    budesonide-formoterol 160-4.5 mcg (SYMBICORT) 160-4.5 mcg/actuation HFAA INHALE 2 PUFFS INTO THE LUNGS TWO TIMES A DAY. 10.2 g 11    carvediloL (COREG) 3.125 MG tablet Take 1 tablet (3.125 mg total) by mouth 2 (two) times daily with meals. 60 tablet 5    empagliflozin (JARDIANCE) 25 mg tablet Take 1 tablet (25 mg total) by mouth once daily. 90 tablet 1    famotidine (PEPCID) 40 MG tablet TAKE 1 TABLET BY MOUTH EVERY EVENING 30 tablet 11    finasteride (PROSCAR) 5 mg tablet TAKE 1 TABLET BY MOUTH once daily 90 tablet 2    fluticasone propionate (FLONASE) 50 mcg/actuation nasal spray 2 sprays (100 mcg total) by Each Nostril route once daily. 18.2 mL 6    furosemide (LASIX) 40 MG tablet Take 1 tablet (40 mg total) by mouth 2 (two) times daily. 60 tablet  "11    GLUCOSAMINE HCL/CHONDR ROSARIO A NA (OSTEO BI-FLEX ORAL) Take 1 tablet by mouth 2 (two) times daily.       ipratropium (ATROVENT) 21 mcg (0.03 %) nasal spray 2 sprays by Each Nostril route 2 (two) times daily. 30 mL 0    krill oil 500 mg Cap Take by mouth.      lancets (ACCU-CHEK FASTCLIX LANCET DRUM) Misc TEST TWO TIMES A  each 5    LANTUS SOLOSTAR U-100 INSULIN glargine 100 units/mL SubQ pen INJECT 44 UNITS UNDER THE SKIN EVERY EVENING 45 mL 1    levocetirizine (XYZAL) 5 MG tablet Take 1 tablet (5 mg total) by mouth every evening. 30 tablet 11    levothyroxine (SYNTHROID) 300 MCG Tab Take 1 tablet (300 mcg total) by mouth every morning. 90 tablet 3    lisinopriL 10 MG tablet TAKE 1 BY MOUTH EVERY DAY 30 tablet 7    meloxicam (MOBIC) 15 MG tablet Take 15 mg by mouth.      metFORMIN (GLUCOPHAGE-XR) 500 MG ER 24hr tablet Take 1 tablet (500 mg total) by mouth 2 (two) times daily with meals. 180 tablet 3    methylPREDNISolone (MEDROL DOSEPACK) 4 mg tablet use as directed (Patient not taking: Reported on 10/16/2023) 21 tablet 0    montelukast (SINGULAIR) 10 mg tablet TAKE 1 TABLET BY MOUTH ONCE DAILY 30 tablet 11    neomycin-polymyxin-dexamethasone (DEXACINE) 3.5 mg/g-10,000 unit/g-0.1 % Oint Place into both eyes 2 (two) times a day. Apply to lid margins 3.5 g 0    pen needle, diabetic (BD ULTRA-FINE MINI PEN NEEDLE) 31 gauge x 316" Ndle USE AS DIRECTED 100 each 11    propafenone (RHTHYMOL) 150 MG Tab Take 1 tablet (150 mg total) by mouth every 8 (eight) hours. 90 tablet 11    RABEprazole (ACIPHEX) 20 mg tablet Take 1 tablet (20 mg total) by mouth 2 (two) times daily. 180 tablet 3    REPATHA SURECLICK 140 mg/mL PnIj SMARTSI Milligram(s) Topical Every 2 Weeks      rivaroxaban (XARELTO) 20 mg Tab Take 1 tablet (20 mg total) by mouth daily with dinner or evening meal. 30 tablet 11    sildenafiL (VIAGRA) 100 MG tablet Take 1/2 to 1 tablet by mouth one hour prior to intercourse. Max 100mg per day 30 tablet 11 "     Current Facility-Administered Medications on File Prior to Visit   Medication Dose Route Frequency Provider Last Rate Last Admin    lactated ringers infusion   Intravenous Continuous Omaira Theodore MD   New Bag at 09/09/19 1117     Review of patient's allergies indicates:   Allergen Reactions    Lipitor [atorvastatin] Other (See Comments)     Muscle aches and pains as well as fatigue.     Niacin preparations Other (See Comments)     Causes broken blood vessels and bruises at 4 times normal dose.    Statins-hmg-coa reductase inhibitors Other (See Comments)     Statins cause intolerable myalgias.    Iodinated contrast media Hives     Tachycardia    Omeprazole Hives and Itching    Prilosec [omeprazole magnesium] Hives and Itching       Objective:     Physical Exam  Vitals and nursing note reviewed.   Constitutional:       Appearance: He is well-developed.   HENT:      Head: Normocephalic and atraumatic.   Eyes:      Conjunctiva/sclera: Conjunctivae normal.      Pupils: Pupils are equal, round, and reactive to light.   Cardiovascular:      Rate and Rhythm: Normal rate and regular rhythm.      Pulses: Intact distal pulses.      Heart sounds: Normal heart sounds.   Pulmonary:      Effort: Pulmonary effort is normal.      Breath sounds: Normal breath sounds.   Abdominal:      General: Bowel sounds are normal.      Palpations: Abdomen is soft.   Musculoskeletal:      Cervical back: Normal range of motion and neck supple.   Skin:     General: Skin is warm and dry.   Neurological:      Mental Status: He is alert and oriented to person, place, and time.         Assessment:     1. PVC (premature ventricular contraction)    2. Angina pectoris    3. S/P CABG x 2    4. Aortic atherosclerosis    5. Paroxysmal atrial fibrillation    6. Essential hypertension    7. Left main coronary artery disease    8. Coronary artery disease involving native coronary artery of native heart without angina pectoris    9. Nonrheumatic  aortic valve stenosis    10. Bilateral carotid artery stenosis    11. Iron deficiency anemia due to chronic blood loss    12. BMI 36.0-36.9,adult    13. DELONTE on CPAP    14. DNR (do not resuscitate)    15. Type 2 diabetes mellitus with microalbuminuria, with long-term current use of insulin    16. HCC (hepatocellular carcinoma)        Plan:     PVC (premature ventricular contraction)    Angina pectoris    S/P CABG x 2    Aortic atherosclerosis    Paroxysmal atrial fibrillation    Essential hypertension    Left main coronary artery disease    Coronary artery disease involving native coronary artery of native heart without angina pectoris    Nonrheumatic aortic valve stenosis    Bilateral carotid artery stenosis    Iron deficiency anemia due to chronic blood loss    BMI 36.0-36.9,adult    DELONTE on CPAP    DNR (do not resuscitate)    Type 2 diabetes mellitus with microalbuminuria, with long-term current use of insulin    HCC (hepatocellular carcinoma)        continue coreg 1/2 tab bid- HTN  Continue lisinopril , norvasc - HTN  continue repatha - HLP  Continue xarelto, propafenone- PAF

## 2023-11-10 ENCOUNTER — CLINICAL SUPPORT (OUTPATIENT)
Dept: PULMONOLOGY | Facility: CLINIC | Age: 78
End: 2023-11-10
Payer: MEDICARE

## 2023-11-10 ENCOUNTER — OFFICE VISIT (OUTPATIENT)
Dept: PULMONOLOGY | Facility: CLINIC | Age: 78
End: 2023-11-10
Payer: MEDICARE

## 2023-11-10 VITALS
OXYGEN SATURATION: 96 % | RESPIRATION RATE: 18 BRPM | DIASTOLIC BLOOD PRESSURE: 78 MMHG | WEIGHT: 225.5 LBS | HEART RATE: 84 BPM | BODY MASS INDEX: 35.39 KG/M2 | HEIGHT: 67 IN | SYSTOLIC BLOOD PRESSURE: 120 MMHG

## 2023-11-10 DIAGNOSIS — E78.2 MIXED HYPERLIPIDEMIA: ICD-10-CM

## 2023-11-10 DIAGNOSIS — J32.9 SINUSITIS, UNSPECIFIED CHRONICITY, UNSPECIFIED LOCATION: ICD-10-CM

## 2023-11-10 DIAGNOSIS — I70.0 AORTIC ATHEROSCLEROSIS: ICD-10-CM

## 2023-11-10 DIAGNOSIS — I10 ESSENTIAL HYPERTENSION: Chronic | ICD-10-CM

## 2023-11-10 DIAGNOSIS — Z77.090 ASBESTOS EXPOSURE: ICD-10-CM

## 2023-11-10 DIAGNOSIS — J45.20 MILD INTERMITTENT ASTHMA WITHOUT COMPLICATION: Primary | ICD-10-CM

## 2023-11-10 DIAGNOSIS — E03.9 ACQUIRED HYPOTHYROIDISM: Chronic | ICD-10-CM

## 2023-11-10 DIAGNOSIS — E11.29 TYPE 2 DIABETES MELLITUS WITH MICROALBUMINURIA, WITH LONG-TERM CURRENT USE OF INSULIN: Chronic | ICD-10-CM

## 2023-11-10 DIAGNOSIS — J45.20 MILD INTERMITTENT ASTHMA WITHOUT COMPLICATION: ICD-10-CM

## 2023-11-10 DIAGNOSIS — G47.33 OSA ON CPAP: Chronic | ICD-10-CM

## 2023-11-10 DIAGNOSIS — R80.9 TYPE 2 DIABETES MELLITUS WITH MICROALBUMINURIA, WITH LONG-TERM CURRENT USE OF INSULIN: Chronic | ICD-10-CM

## 2023-11-10 DIAGNOSIS — Z79.4 TYPE 2 DIABETES MELLITUS WITH MICROALBUMINURIA, WITH LONG-TERM CURRENT USE OF INSULIN: Chronic | ICD-10-CM

## 2023-11-10 LAB
BRPFT: NORMAL
FEF 25 75 CHG: 5.5 %
FEF 25 75 LLN: 0.77
FEF 25 75 POST REF: 88.1 %
FEF 25 75 PRE REF: 83.5 %
FEF 25 75 REF: 1.97
FET100 CHG: 3.2 %
FEV1 CHG: 0 %
FEV1 FVC CHG: 0.4 %
FEV1 FVC LLN: 61
FEV1 FVC POST REF: 98 %
FEV1 FVC PRE REF: 97.6 %
FEV1 FVC REF: 76
FEV1 LLN: 1.88
FEV1 POST REF: 87 %
FEV1 PRE REF: 87 %
FEV1 REF: 2.68
FVC CHG: -0.3 %
FVC LLN: 2.59
FVC POST REF: 88.3 %
FVC PRE REF: 88.6 %
FVC REF: 3.57
PEF CHG: 0.8 %
PEF LLN: 4.8
PEF POST REF: 90.8 %
PEF PRE REF: 90.1 %
PEF REF: 6.92
POST FEF 25 75: 1.74 L/S
POST FET 100: 8.32 SEC
POST FEV1 FVC: 73.96 %
POST FEV1: 2.33 L
POST FVC: 3.15 L
POST PEF: 6.28 L/S
PRE FEF 25 75: 1.64 L/S
PRE FET 100: 8.06 SEC
PRE FEV1 FVC: 73.7 %
PRE FEV1: 2.33 L
PRE FVC: 3.16 L
PRE PEF: 6.24 L/S

## 2023-11-10 PROCEDURE — 3288F PR FALLS RISK ASSESSMENT DOCUMENTED: ICD-10-PCS | Mod: CPTII,S$GLB,, | Performed by: INTERNAL MEDICINE

## 2023-11-10 PROCEDURE — 3074F SYST BP LT 130 MM HG: CPT | Mod: CPTII,S$GLB,, | Performed by: INTERNAL MEDICINE

## 2023-11-10 PROCEDURE — 3074F PR MOST RECENT SYSTOLIC BLOOD PRESSURE < 130 MM HG: ICD-10-PCS | Mod: CPTII,S$GLB,, | Performed by: INTERNAL MEDICINE

## 2023-11-10 PROCEDURE — 1157F ADVNC CARE PLAN IN RCRD: CPT | Mod: CPTII,S$GLB,, | Performed by: INTERNAL MEDICINE

## 2023-11-10 PROCEDURE — 1101F PR PT FALLS ASSESS DOC 0-1 FALLS W/OUT INJ PAST YR: ICD-10-PCS | Mod: CPTII,S$GLB,, | Performed by: INTERNAL MEDICINE

## 2023-11-10 PROCEDURE — 94060 PR EVAL OF BRONCHOSPASM: ICD-10-PCS | Mod: S$GLB,,, | Performed by: INTERNAL MEDICINE

## 2023-11-10 PROCEDURE — 99214 PR OFFICE/OUTPT VISIT, EST, LEVL IV, 30-39 MIN: ICD-10-PCS | Mod: 25,S$GLB,, | Performed by: INTERNAL MEDICINE

## 2023-11-10 PROCEDURE — 99999 PR PBB SHADOW E&M-EST. PATIENT-LVL V: CPT | Mod: PBBFAC,,, | Performed by: INTERNAL MEDICINE

## 2023-11-10 PROCEDURE — 1101F PT FALLS ASSESS-DOCD LE1/YR: CPT | Mod: CPTII,S$GLB,, | Performed by: INTERNAL MEDICINE

## 2023-11-10 PROCEDURE — 1159F PR MEDICATION LIST DOCUMENTED IN MEDICAL RECORD: ICD-10-PCS | Mod: CPTII,S$GLB,, | Performed by: INTERNAL MEDICINE

## 2023-11-10 PROCEDURE — 94060 EVALUATION OF WHEEZING: CPT | Mod: S$GLB,,, | Performed by: INTERNAL MEDICINE

## 2023-11-10 PROCEDURE — 3288F FALL RISK ASSESSMENT DOCD: CPT | Mod: CPTII,S$GLB,, | Performed by: INTERNAL MEDICINE

## 2023-11-10 PROCEDURE — 3078F PR MOST RECENT DIASTOLIC BLOOD PRESSURE < 80 MM HG: ICD-10-PCS | Mod: CPTII,S$GLB,, | Performed by: INTERNAL MEDICINE

## 2023-11-10 PROCEDURE — 1157F PR ADVANCE CARE PLAN OR EQUIV PRESENT IN MEDICAL RECORD: ICD-10-PCS | Mod: CPTII,S$GLB,, | Performed by: INTERNAL MEDICINE

## 2023-11-10 PROCEDURE — 99999 PR PBB SHADOW E&M-EST. PATIENT-LVL V: ICD-10-PCS | Mod: PBBFAC,,, | Performed by: INTERNAL MEDICINE

## 2023-11-10 PROCEDURE — 1159F MED LIST DOCD IN RCRD: CPT | Mod: CPTII,S$GLB,, | Performed by: INTERNAL MEDICINE

## 2023-11-10 PROCEDURE — 99214 OFFICE O/P EST MOD 30 MIN: CPT | Mod: 25,S$GLB,, | Performed by: INTERNAL MEDICINE

## 2023-11-10 PROCEDURE — 3078F DIAST BP <80 MM HG: CPT | Mod: CPTII,S$GLB,, | Performed by: INTERNAL MEDICINE

## 2023-11-10 RX ORDER — METHYLPREDNISOLONE 4 MG/1
TABLET ORAL
Qty: 21 TABLET | Refills: 0 | Status: SHIPPED | OUTPATIENT
Start: 2023-11-10 | End: 2023-12-05

## 2023-11-10 RX ORDER — CEFDINIR 300 MG/1
300 CAPSULE ORAL 2 TIMES DAILY
Qty: 20 CAPSULE | Refills: 0 | Status: SHIPPED | OUTPATIENT
Start: 2023-11-10 | End: 2023-11-20

## 2023-11-10 RX ORDER — BUDESONIDE AND FORMOTEROL FUMARATE DIHYDRATE 160; 4.5 UG/1; UG/1
AEROSOL RESPIRATORY (INHALATION)
Qty: 10.2 G | Refills: 11 | Status: SHIPPED | OUTPATIENT
Start: 2023-11-10

## 2023-11-10 RX ORDER — ALBUTEROL SULFATE 90 UG/1
2 AEROSOL, METERED RESPIRATORY (INHALATION) EVERY 6 HOURS PRN
Qty: 18 G | Refills: 3 | Status: SHIPPED | OUTPATIENT
Start: 2023-11-10

## 2023-11-10 NOTE — ASSESSMENT & PLAN NOTE
ACT score 21  Regime Symbicort + albuterol    FeNo was 28  Spirometry reviewed      Requested Prescriptions     Signed Prescriptions Disp Refills    methylPREDNISolone (MEDROL DOSEPACK) 4 mg tablet 21 tablet 0     Sig: use as directed    cefdinir (OMNICEF) 300 MG capsule 20 capsule 0     Sig: Take 1 capsule (300 mg total) by mouth 2 (two) times daily. for 10 days    albuterol (VENTOLIN HFA) 90 mcg/actuation inhaler 18 g 3     Sig: Inhale 2 puffs into the lungs every 6 (six) hours as needed for Wheezing or Shortness of Breath. Rescue    budesonide-formoterol 160-4.5 mcg (SYMBICORT) 160-4.5 mcg/actuation HFAA 10.2 g 11     Sig: INHALE 2 PUFFS INTO THE LUNGS TWO TIMES A DAY.

## 2023-11-10 NOTE — ASSESSMENT & PLAN NOTE
[facility]  Using CPAP and benefits  Data 10/10/2023 -11/8/2023  Usage > 4 hrs was 86.7%  CPAP 11 cm    USING AND BENEFITS

## 2023-11-10 NOTE — PROGRESS NOTES
Subjective:       Patient ID: Erendira Pretty is a 78 y.o. male.         Immunization History   Administered Date(s) Administered    COVID-19, MRNA, LN-S, PF (Pfizer) (Purple Cap) 2021, 2021, 2021    COVID-19, mRNA, LNP-S, bivalent booster, PF (PFIZER OMICRON) 10/03/2022    Hepatitis A 2003, 2004    Hepatitis A, Pediatric/Adolescent, 2 Dose 2003, 2004    Hepatitis B 2003, 2003, 2004, 2014, 2014    Hepatitis B, Adult 2014, 2014, 10/20/2014    Hepatitis B, Pediatric/Adolescent 2003, 2003, 2004    Influenza 2004, 10/17/2007, 10/20/2008, 10/07/2009, 10/20/2010    Influenza (FLUAD) - Trivalent - Adjuvanted - PF (65+) 2018, 2019    Influenza - High Dose - PF (65 years and older) 10/03/2011, 10/29/2013, 10/21/2014, 2015, 11/10/2016, 10/18/2017, 2018, 10/01/2020, 2021    Influenza - Quadrivalent - PF *Preferred* (6 months and older) 2004    Influenza - Trivalent (ADULT) 2004, 10/17/2007, 10/20/2008, 10/07/2009, 10/20/2010, 2012    Influenza A (H1N1) 2009 Monovalent - IM - PF 2009    Influenza Split 2012    Pneumococcal Conjugate - 13 Valent 2015    Pneumococcal Polysaccharide - 23 Valent 2014    Tdap 2014    Zoster 2012    Zoster Recombinant 2018, 2019, 2019     Social History     Tobacco Use   Smoking Status Former    Current packs/day: 0.00    Average packs/day: 0.5 packs/day for 4.0 years (2.0 ttl pk-yrs)    Types: Cigarettes    Start date: 1968    Quit date: 1972    Years since quittin.4   Smokeless Tobacco Former    Types: Chew    Quit date: 1976      Asthma Control Test  In the past 4  weeks, how much of the time did your asthma keep you from getting as much done at work, school or at home?: None of the time  During the past 4 weeks, how often have you had shortness of breath?: Not at  all  During the past 4 weeks, how often did your asthma symptoms (wheezing, couging, shortness of breath, chest tightness or pain) wake you up at night or earlier than usual in the morning?: 2 or 3 nights a week  During the past 4 weeks, how often have you used your rescue inhaler or nebulizer medication (such as albuterol)?: Not at all  How would you rate your asthma control during the past 4 weeks?: Well controlled  If your score is 19 or less, your asthma may not be under control: 21             11/10/2023     7:56 AM   EPWORTH SLEEPINESS SCALE   Sitting and reading 0   Watching TV 3   Sitting, inactive in a public place (e.g. a theatre or a meeting) 0   As a passenger in a car for an hour without a break 0   Lying down to rest in the afternoon when circumstances permit 3   Sitting and talking to someone 0   Sitting quietly after a lunch without alcohol 3   In a car, while stopped for a few minutes in traffic 0   Total score 9               MMRC Dyspnea Scale (4 is worst)     [x] MMRC 0: Dyspneic on strenuous excercise (0 points)    [] MMRC 1: Dyspneic on walking a slight hill (0 points)    [] MMRC 2: Dyspneic on walking level ground; must stop occasionally due to breathlessness (1 point)    [] MMRC 3: Must stop for breathlessness after walking 100 yards or after a few minutes (2 points)    [] MMRC 4: Cannot leave house; breathless on dressing/undressing (3 points)        Chief Complaint: Sleep Apnea and Asthma    Mr Maria De Jesus Krishna is 76y.o.   DELONTE on CPAP, Chr wheezy asthmatic bronchitis, Chr nasal congestion  No interval exacerbations  immunizations current  Has no SOB, no cough, congestion  Lingering issue with leg muscle aches: vascular studies normal  LV 04/28/2021  Has new CPAP machine: Dream station 2  Here to review results  1. CPAP Download: CPAP 11 cm, Usage > 4 hrs was 100%  2. Spirometry was Normal  3. CXR Normal  Average AHI 2.5  Asthma score 25     Using nebuliser  Currently on SYMBICORT: refills  "sent.  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 There is no shortness of breath, sputum production or wheezing. Associated symptoms include postnasal drip. Pertinent negatives include no headaches or orthopnea. His past medical history is significant for asthma.     Review of Systems   Constitutional:  Negative for fatigue.   HENT:  Positive for postnasal drip, sinus pressure and congestion. Negative for voice change.    Eyes: Negative.    Respiratory:  Positive for cough. Negative for snoring, sputum production, shortness of breath, wheezing, orthopnea, asthma nighttime symptoms, pleurisy, dyspnea on extertion, use of rescue inhaler, somnolence and Paroxysmal Nocturnal Dyspnea.    Cardiovascular:  Negative for leg swelling.   Genitourinary: Negative.    Endocrine: endocrine negative    Musculoskeletal: Negative.    Skin: Negative.    Gastrointestinal: Negative.    Neurological:  Negative for headaches.   Psychiatric/Behavioral: Negative.     All other systems reviewed and are negative.      Objective:       Vitals:    11/10/23 0754   BP: 120/78   Pulse: 84   Resp: 18   SpO2: 96%   Weight: 102.3 kg (225 lb 8.5 oz)   Height: 5' 7" (1.702 m)           Physical Exam   Constitutional: He is " oriented to person, place, and time. He appears well-developed and well-nourished. He is obese.   HENT:   Head: Normocephalic.   Nose: No mucosal edema.   Mouth/Throat: Oropharynx is clear and moist. Mallampati Score: III.   Cardiovascular: Normal rate, regular rhythm and intact distal pulses.   No murmur heard.  Pulmonary/Chest: Normal expansion, symmetric chest wall expansion, effort normal and breath sounds normal. He has no decreased breath sounds. He has no rhonchi. He has no rales.   Abdominal: Soft. Bowel sounds are normal.   Musculoskeletal:         General: No edema. Normal range of motion.      Cervical back: Normal range of motion and neck supple.   Lymphadenopathy:     He has no cervical adenopathy.   Neurological: He is alert and oriented to person, place, and time. He has normal reflexes.   Skin: Skin is warm and dry.   Psychiatric: He has a normal mood and affect.   Nursing note and vitals reviewed.    Personal Diagnostic Review        Personal Diagnostic Review  [x]  DOWNLOAD   Compliance Information 10/10/2023 - 11/8/2023  Compliance Summary  10/10/2023 - 11/8/2023 (30 days)  Days with Device Usage 26 days  Days without Device Usage 4 days  Percent Days with Device Usage 86.7%  Cumulative Usage 9 days 17 hrs. 46 secs.  Maximum Usage (1 Day) 10 hrs. 39 mins. 14 secs.  Average Usage (All Days) 7 hrs. 46 mins. 1 secs.  Average Usage (Days Used) 8 hrs. 57 mins. 43 secs.  Minimum Usage (1 Day) 6 hrs. 48 mins. 7 secs.  Percent of Days with Usage >= 4 Hours 86.7%  Percent of Days with Usage < 4 Hours 13.3%  Date Range  Total Blower Time 9 days 20 hrs. 54 mins. 53 secs.  CPAP Summary  Average Time in Large Leak Per Day 1 mins. 9 secs.  Average AHI 3.3  CPAP 11.0 cmH2O      [x]  SPIROMETRY      FEV1 2.33L( 87%), FVC 3.16L( 88.6%)  FEV1/FVC 74    FENO was 28        Posterior Segment OCT Retina-Both eyes  Right Eye  Quality was good. Scan locations included subfoveal, juxtafoveal,   extrafoveal, nasal,  "temporal, superior, inferior. Progression has been   stable. Findings include normal foveal contour, epiretinal membrane.     Left Eye  Quality was good. Scan locations included subfoveal, juxtafoveal,   extrafoveal, nasal, temporal, superior, inferior. Progression has been   stable. Findings include normal foveal contour, epiretinal membrane.     Notes  See progress note.            11/10/2023     7:54 AM 11/9/2023     1:56 PM 10/16/2023     8:20 AM 9/21/2023     1:10 PM 9/6/2023     1:17 PM 8/28/2023    10:29 AM 8/21/2023     1:59 PM   Pulmonary Function Tests   SpO2 96 % 98 % 99 % 96 %   98 %   Height 5' 7" (1.702 m) 5' 7" (1.702 m) 5' 7" (1.702 m) 5' 7" (1.702 m) 5' 7" (1.702 m) 5' 7" (1.702 m) 5' 7" (1.702 m)   Weight 102.3 kg (225 lb 8.5 oz) 102.9 kg (226 lb 13.7 oz) 102.9 kg (226 lb 13.7 oz) 104.1 kg (229 lb 8 oz) 104.4 kg (230 lb 2.6 oz) 103 kg (227 lb) 103.3 kg (227 lb 11.8 oz)   BMI (Calculated) 35.3 35.5 35.5 35.9 36 35.5 35.7         Assessment:       Problem List Items Addressed This Visit       Essential hypertension (Chronic)     Stable on NORVASC         Acquired hypothyroidism (Chronic)     Stable on Synthroid         DELONTE on CPAP (Chronic)     Epworth9  Using CPAP and benefits  Data 10/10/2023 -11/8/2023  Usage > 4 hrs was 86.7%  CPAP 11 cm    USING AND BENEFITS         Type 2 diabetes mellitus with microalbuminuria, with long-term current use of insulin (Chronic)     On JARDIANCE Lantus         Asbestos exposure    Aortic atherosclerosis    BMI 36.0-36.9,adult     Patient would benefit from weight loss and has tried to set realistic goals to achieve success. Lifestyle changes were discussed on eating healthy, exercising at least 150 minutes weekly, and reducing sedentary behavior.   Discussed the risk factors associated with obesity: Arthritis/DELONTE/Diabetes/Fatty Liver/Cardiovascular disease/GERD/HTN/HLP.          Mild intermittent asthma without complication - Primary     ACT score 21  Regime " Symbicort + albuterol    FeNo was 28  Spirometry reviewed      Requested Prescriptions     Signed Prescriptions Disp Refills    methylPREDNISolone (MEDROL DOSEPACK) 4 mg tablet 21 tablet 0     Sig: use as directed    cefdinir (OMNICEF) 300 MG capsule 20 capsule 0     Sig: Take 1 capsule (300 mg total) by mouth 2 (two) times daily. for 10 days    albuterol (VENTOLIN HFA) 90 mcg/actuation inhaler 18 g 3     Sig: Inhale 2 puffs into the lungs every 6 (six) hours as needed for Wheezing or Shortness of Breath. Rescue    budesonide-formoterol 160-4.5 mcg (SYMBICORT) 160-4.5 mcg/actuation HFAA 10.2 g 11     Sig: INHALE 2 PUFFS INTO THE LUNGS TWO TIMES A DAY.             Relevant Medications    albuterol (VENTOLIN HFA) 90 mcg/actuation inhaler    budesonide-formoterol 160-4.5 mcg (SYMBICORT) 160-4.5 mcg/actuation HFAA    Other Relevant Orders    X-Ray Chest PA And Lateral    Spirometry without Bronchodilator    Fraction of  Nitric Oxide    Mixed hyperlipidemia     STABLE ON ZETIA, TRICOR          Other Visit Diagnoses       Sinusitis, unspecified chronicity, unspecified location        Relevant Medications    methylPREDNISolone (MEDROL DOSEPACK) 4 mg tablet    cefdinir (OMNICEF) 300 MG capsule             Plan:          Requested Prescriptions     Signed Prescriptions Disp Refills    methylPREDNISolone (MEDROL DOSEPACK) 4 mg tablet 21 tablet 0     Sig: use as directed    cefdinir (OMNICEF) 300 MG capsule 20 capsule 0     Sig: Take 1 capsule (300 mg total) by mouth 2 (two) times daily. for 10 days    albuterol (VENTOLIN HFA) 90 mcg/actuation inhaler 18 g 3     Sig: Inhale 2 puffs into the lungs every 6 (six) hours as needed for Wheezing or Shortness of Breath. Rescue    budesonide-formoterol 160-4.5 mcg (SYMBICORT) 160-4.5 mcg/actuation HFAA 10.2 g 11     Sig: INHALE 2 PUFFS INTO THE LUNGS TWO TIMES A DAY.     Orders Placed This Encounter   Procedures    X-Ray Chest PA And Lateral     Standing Status:   Future      Standing Expiration Date:   2024    Spirometry without Bronchodilator     Standing Status:   Future     Standing Expiration Date:   2024     Order Specific Question:   Release to patient     Answer:   Immediate    Fraction of  Nitric Oxide     Standing Status:   Future     Standing Expiration Date:   11/10/2024     Order Specific Question:   Release to patient     Answer:   Immediate         Follow up in about 6 months (around 5/10/2024), or cxr, Ryder, feno, download, med refill.    This note was prepared using voice recognition system and is likely to have sound alike errors that may have been overlooked even after proof reading.  Please call me with any questions    Discussed diagnosis, its evaluation, treatment and usual course. All questions answered.    Thank you for the courtesy of participating in the care of this patient    Rishi Molina MD

## 2023-11-10 NOTE — PROCEDURES
Clinical Guide to Interpretation or FeNO Levels :    FeNO  (ppb) LOW INTERMEDIATE HIGH   ADULT VALUES < 25 25-50          > 50   Th2-driven Inflammation Unlikely Likely Significant     Patients FeNO level at this visit : _28_ (ppb)    Interpretation of FeNO measurement in adults:  [] FENO is less than 25 ppb implies non eosinophilic airway inflammation or the absence of airway inflammation.    Comment: Low FENO (<25 ppb) in adult asthmatics with persistent symptoms suggests other etiologies for these symptoms and a lower likelihood of benefit from adding or increasing inhaled glucocorticoids.    [x] FENO between 25 and 50 ppb in adults should be interpreted cautiously with reference to the clinical situation (eg, symptomatic, on or off therapy, current smoking).    [] FENO greater than 50 ppb in adults  suggests eosinophilic airway inflammation   Comment: High FENO (>50 ppb) in adult asthmatics even with atypical symptoms suggests glucocorticoid responsiveness. High FENO (>50 ppb) can help identify poor adherence or uncontrolled inflammation in asthma patients with otherwise seemingly controlled asthma.    Discussion:  A FENO less than 25 ppb in adults and less than 20 ppb in children younger than 12 years of age implies noneosinophilic airway inflammation or the absence of airway inflammation.  A FENO greater than 50 ppb in adults or greater than 35 ppb in children suggests eosinophilic airway inflammation.   Values of FENO between 25 and 50 ppb in adults (20 to 35 ppb in children) should be interpreted cautiously with reference to the clinical situation (eg, symptomatic, on or off therapy, current smoking).  A rising FENO with a greater than 20 percent change and more than 25 ppb (20 ppb in children) from a previously stable level suggests increasing eosinophilic airway inflammation, but there are wide inter-individual differences.  A decrease in FENO greater than 20 percent for values over 50 ppb or more than  10 ppb for values less than 50 ppb may be clinically important.  ?FENO in other respiratory diseases - Several other diseases are associated with altered levels of exhaled NO: low levels of FENO have been noted in cystic fibrosis, current smoking, pulmonary hypertension, hypothermia, primary ciliary dyskinesia, and bronchopulmonary dysplasia. Elevated FENO has been noted in atopy, nonasthmatic eosinophilic bronchitis, COPD exacerbations, noncystic fibrosis bronchiectasis, and viral upper respiratory infections.    REFERENCE:  ATS Board of Directors, December 2004, and by the ERS Executive Committee, June 2004. ATS/ERS Recommendations for Standardized Procedures for the Online and Offline Measurement of Exhaled Lower Respiratory Nitric Oxide and Nasal Nitric Oxide. Guideline 2005

## 2023-11-20 DIAGNOSIS — E11.29 TYPE 2 DIABETES MELLITUS WITH MICROALBUMINURIA, WITH LONG-TERM CURRENT USE OF INSULIN: Chronic | ICD-10-CM

## 2023-11-20 DIAGNOSIS — R80.9 TYPE 2 DIABETES MELLITUS WITH MICROALBUMINURIA, WITH LONG-TERM CURRENT USE OF INSULIN: Chronic | ICD-10-CM

## 2023-11-20 DIAGNOSIS — Z79.4 TYPE 2 DIABETES MELLITUS WITH MICROALBUMINURIA, WITH LONG-TERM CURRENT USE OF INSULIN: Chronic | ICD-10-CM

## 2023-11-20 RX ORDER — INSULIN GLARGINE 100 [IU]/ML
44 INJECTION, SOLUTION SUBCUTANEOUS NIGHTLY
Qty: 45 ML | Refills: 1 | Status: SHIPPED | OUTPATIENT
Start: 2023-11-20

## 2023-11-20 NOTE — TELEPHONE ENCOUNTER
No care due was identified.  [facility] Embedded Care Due Messages. Reference number: [id number].   11/20/2023 8:31:43 AM CST

## 2023-11-20 NOTE — TELEPHONE ENCOUNTER
Refill Decision Note   Feleciajunie Maria De Jesus  is requesting a refill authorization.    Brief Assessment and Rationale for Refill:   Approve       Medication Therapy Plan:         Comments:     Note composed:1:27 PM 11/20/2023

## 2023-11-21 ENCOUNTER — HOSPITAL ENCOUNTER (OUTPATIENT)
Dept: RADIOLOGY | Facility: HOSPITAL | Age: 78
Discharge: HOME | End: 2023-11-21
Attending: PEDIATRICS
Payer: MEDICARE

## 2023-11-21 ENCOUNTER — OFFICE VISIT (OUTPATIENT)
Dept: INTERNAL MEDICINE | Facility: CLINIC | Age: 78
End: 2023-11-21
Payer: MEDICARE

## 2023-11-21 VITALS
BODY MASS INDEX: 35.19 KG/M2 | HEART RATE: 78 BPM | OXYGEN SATURATION: 99 % | SYSTOLIC BLOOD PRESSURE: 118 MMHG | WEIGHT: 224.19 LBS | TEMPERATURE: 98 F | HEIGHT: 67 IN | RESPIRATION RATE: 16 BRPM | DIASTOLIC BLOOD PRESSURE: 76 MMHG

## 2023-11-21 DIAGNOSIS — K80.50 GALL BLADDER PAIN: Primary | ICD-10-CM

## 2023-11-21 DIAGNOSIS — K80.50 GALL BLADDER PAIN: ICD-10-CM

## 2023-11-21 PROCEDURE — 1125F AMNT PAIN NOTED PAIN PRSNT: CPT | Mod: CPTII,S$GLB,, | Performed by: PEDIATRICS

## 2023-11-21 PROCEDURE — 1160F PR REVIEW ALL MEDS BY PRESCRIBER/CLIN PHARMACIST DOCUMENTED: ICD-10-PCS | Mod: CPTII,S$GLB,, | Performed by: PEDIATRICS

## 2023-11-21 PROCEDURE — 3078F DIAST BP <80 MM HG: CPT | Mod: CPTII,S$GLB,, | Performed by: PEDIATRICS

## 2023-11-21 PROCEDURE — 1101F PR PT FALLS ASSESS DOC 0-1 FALLS W/OUT INJ PAST YR: ICD-10-PCS | Mod: CPTII,S$GLB,, | Performed by: PEDIATRICS

## 2023-11-21 PROCEDURE — 3288F FALL RISK ASSESSMENT DOCD: CPT | Mod: CPTII,S$GLB,, | Performed by: PEDIATRICS

## 2023-11-21 PROCEDURE — 1157F ADVNC CARE PLAN IN RCRD: CPT | Mod: CPTII,S$GLB,, | Performed by: PEDIATRICS

## 2023-11-21 PROCEDURE — 3074F PR MOST RECENT SYSTOLIC BLOOD PRESSURE < 130 MM HG: ICD-10-PCS | Mod: CPTII,S$GLB,, | Performed by: PEDIATRICS

## 2023-11-21 PROCEDURE — 1160F RVW MEDS BY RX/DR IN RCRD: CPT | Mod: CPTII,S$GLB,, | Performed by: PEDIATRICS

## 2023-11-21 PROCEDURE — 3288F PR FALLS RISK ASSESSMENT DOCUMENTED: ICD-10-PCS | Mod: CPTII,S$GLB,, | Performed by: PEDIATRICS

## 2023-11-21 PROCEDURE — 3074F SYST BP LT 130 MM HG: CPT | Mod: CPTII,S$GLB,, | Performed by: PEDIATRICS

## 2023-11-21 PROCEDURE — 99214 OFFICE O/P EST MOD 30 MIN: CPT | Mod: S$GLB,,, | Performed by: PEDIATRICS

## 2023-11-21 PROCEDURE — 1101F PT FALLS ASSESS-DOCD LE1/YR: CPT | Mod: CPTII,S$GLB,, | Performed by: PEDIATRICS

## 2023-11-21 PROCEDURE — 74019 RADEX ABDOMEN 2 VIEWS: CPT | Mod: 26,,, | Performed by: RADIOLOGY

## 2023-11-21 PROCEDURE — 1125F PR PAIN SEVERITY QUANTIFIED, PAIN PRESENT: ICD-10-PCS | Mod: CPTII,S$GLB,, | Performed by: PEDIATRICS

## 2023-11-21 PROCEDURE — 99999 PR PBB SHADOW E&M-EST. PATIENT-LVL V: ICD-10-PCS | Mod: PBBFAC,,, | Performed by: PEDIATRICS

## 2023-11-21 PROCEDURE — 74019 XR ABDOMEN FLAT AND ERECT: ICD-10-PCS | Mod: 26,,, | Performed by: RADIOLOGY

## 2023-11-21 PROCEDURE — 3078F PR MOST RECENT DIASTOLIC BLOOD PRESSURE < 80 MM HG: ICD-10-PCS | Mod: CPTII,S$GLB,, | Performed by: PEDIATRICS

## 2023-11-21 PROCEDURE — 1159F MED LIST DOCD IN RCRD: CPT | Mod: CPTII,S$GLB,, | Performed by: PEDIATRICS

## 2023-11-21 PROCEDURE — 99214 PR OFFICE/OUTPT VISIT, EST, LEVL IV, 30-39 MIN: ICD-10-PCS | Mod: S$GLB,,, | Performed by: PEDIATRICS

## 2023-11-21 PROCEDURE — 1159F PR MEDICATION LIST DOCUMENTED IN MEDICAL RECORD: ICD-10-PCS | Mod: CPTII,S$GLB,, | Performed by: PEDIATRICS

## 2023-11-21 PROCEDURE — 1157F PR ADVANCE CARE PLAN OR EQUIV PRESENT IN MEDICAL RECORD: ICD-10-PCS | Mod: CPTII,S$GLB,, | Performed by: PEDIATRICS

## 2023-11-21 PROCEDURE — 74019 RADEX ABDOMEN 2 VIEWS: CPT | Mod: TC

## 2023-11-21 PROCEDURE — 99999 PR PBB SHADOW E&M-EST. PATIENT-LVL V: CPT | Mod: PBBFAC,,, | Performed by: PEDIATRICS

## 2023-11-21 NOTE — PROGRESS NOTES
Subjective     Patient ID: Erendira Pretty is a 78 y.o. male.    Chief Complaint: Abdominal Pain    Erendira Pretty is a 78 y.o. male who presents to the clinic for abdominal pain. Pt reports his sxs have been occurring for 3-4 weeks. No nausea, no vomiting, no abdominal cramping. Pain is usually around the belly button and the middle of the abdomen. He also describes constipation and diarrhea (not liquid). He also stated when he eats greasy foods the pain increases. Still has gallbladder and appendix. Sunday morning, he ate breakfast (sausage and eggs) and the pain came back. Last colonoscopy was in 2019. 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 Subspecialty notes, labs, and imaging reviewed. On daily PPI with nighttime H2 blocker.              Review of Systems   Constitutional:  Negative for chills, diaphoresis, fatigue and fever.   HENT:  Negative for sore throat and trouble swallowing.    Respiratory:  Negative for cough and shortness of breath.    Cardiovascular:  Negative for chest pain.   Gastrointestinal:  Positive for abdominal pain, constipation and diarrhea. Negative for anal bleeding, nausea and vomiting.   Endocrine: Negative for cold intolerance, heat intolerance, polydipsia, polyphagia and polyuria.   All other systems reviewed and are negative.         Objective     Physical Exam  Constitutional:       General: He is not in acute distress.     Appearance: Normal appearance. He is obese. He is not ill-appearing or toxic-appearing.   Cardiovascular:      Rate and Rhythm: Normal rate and regular rhythm.      Pulses: Normal pulses.      Heart sounds: Normal heart sounds. No murmur heard.     No friction rub. No gallop.   Pulmonary:      Effort: Pulmonary effort is normal.      Breath sounds: Normal breath sounds.   Abdominal:      General: There is no distension.      Palpations: There is no mass.      Tenderness: There is no abdominal tenderness. There is no guarding or rebound.       Hernia: A hernia (reducible ventral hernia) is present.   Neurological:      Mental Status: He is alert and oriented to person, place, and time.   Psychiatric:         Mood and Affect: Mood normal.         Behavior: Behavior normal.         Thought Content: Thought content normal.         Judgment: Judgment normal.            Assessment and Plan     1. Gall bladder pain  -     X-Ray Abdomen Flat And Erect; Future; Expected date: 11/21/2023  -     US Abdomen Limited; Future; Expected date: 11/21/2023  -     Comprehensive Metabolic Panel; Future; Expected date: 11/21/2023  -     CBC Auto Differential; Future; Expected date: 11/21/2023        Above work up today to see if there are any secondary causes. I suspect this is his gallbladder from history. Increase aciphex 20 mg from 1 tablet daily to BID and continue pepcid complete as directed. If pt has a fever, vomits, or skin changes immediately seek emergent care. Covid and RSV vaccines recommended. Flu vaccinated.          No follow-ups on file.

## 2023-11-29 RX ORDER — LISINOPRIL 10 MG/1
TABLET ORAL
Qty: 30 TABLET | Refills: 7 | Status: SHIPPED | OUTPATIENT
Start: 2023-11-29

## 2023-12-01 ENCOUNTER — HOSPITAL ENCOUNTER (OUTPATIENT)
Dept: RADIOLOGY | Facility: HOSPITAL | Age: 78
Discharge: HOME | End: 2023-12-01
Attending: PEDIATRICS
Payer: MEDICARE

## 2023-12-01 DIAGNOSIS — R93.2 ABNORMAL LIVER ULTRASOUND: Primary | ICD-10-CM

## 2023-12-01 DIAGNOSIS — K80.50 GALL BLADDER PAIN: ICD-10-CM

## 2023-12-01 PROCEDURE — 76705 US ABDOMEN LIMITED: ICD-10-PCS | Mod: 26,,, | Performed by: RADIOLOGY

## 2023-12-01 PROCEDURE — 76705 ECHO EXAM OF ABDOMEN: CPT | Mod: 26,,, | Performed by: RADIOLOGY

## 2023-12-01 PROCEDURE — 76705 ECHO EXAM OF ABDOMEN: CPT | Mod: TC

## 2023-12-05 ENCOUNTER — LAB VISIT (OUTPATIENT)
Dept: LAB | Facility: HOSPITAL | Age: 78
End: 2023-12-05
Attending: NURSE PRACTITIONER
Payer: MEDICARE

## 2023-12-05 ENCOUNTER — OFFICE VISIT (OUTPATIENT)
Dept: HEPATOLOGY | Facility: CLINIC | Age: 78
End: 2023-12-05
Payer: MEDICARE

## 2023-12-05 VITALS
HEIGHT: 67 IN | WEIGHT: 227.31 LBS | SYSTOLIC BLOOD PRESSURE: 137 MMHG | DIASTOLIC BLOOD PRESSURE: 82 MMHG | BODY MASS INDEX: 35.68 KG/M2 | HEART RATE: 99 BPM

## 2023-12-05 DIAGNOSIS — C22.0 HCC (HEPATOCELLULAR CARCINOMA): ICD-10-CM

## 2023-12-05 DIAGNOSIS — K83.8 DILATION OF COMMON BILE DUCT: ICD-10-CM

## 2023-12-05 DIAGNOSIS — R10.30 LOWER ABDOMINAL PAIN: Primary | ICD-10-CM

## 2023-12-05 LAB
INR PPP: 1.2 (ref 0.8–1.2)
PROTHROMBIN TIME: 12.4 SEC (ref 9–12.5)

## 2023-12-05 PROCEDURE — 99999 PR PBB SHADOW E&M-EST. PATIENT-LVL III: ICD-10-PCS | Mod: PBBFAC,,, | Performed by: NURSE PRACTITIONER

## 2023-12-05 PROCEDURE — 1159F MED LIST DOCD IN RCRD: CPT | Mod: CPTII,S$GLB,, | Performed by: NURSE PRACTITIONER

## 2023-12-05 PROCEDURE — 3079F PR MOST RECENT DIASTOLIC BLOOD PRESSURE 80-89 MM HG: ICD-10-PCS | Mod: CPTII,S$GLB,, | Performed by: NURSE PRACTITIONER

## 2023-12-05 PROCEDURE — 3288F PR FALLS RISK ASSESSMENT DOCUMENTED: ICD-10-PCS | Mod: CPTII,S$GLB,, | Performed by: NURSE PRACTITIONER

## 2023-12-05 PROCEDURE — 3075F PR MOST RECENT SYSTOLIC BLOOD PRESS GE 130-139MM HG: ICD-10-PCS | Mod: CPTII,S$GLB,, | Performed by: NURSE PRACTITIONER

## 2023-12-05 PROCEDURE — 1125F PR PAIN SEVERITY QUANTIFIED, PAIN PRESENT: ICD-10-PCS | Mod: CPTII,S$GLB,, | Performed by: NURSE PRACTITIONER

## 2023-12-05 PROCEDURE — 99999 PR PBB SHADOW E&M-EST. PATIENT-LVL III: CPT | Mod: PBBFAC,,, | Performed by: NURSE PRACTITIONER

## 2023-12-05 PROCEDURE — 85610 PROTHROMBIN TIME: CPT | Performed by: NURSE PRACTITIONER

## 2023-12-05 PROCEDURE — 3079F DIAST BP 80-89 MM HG: CPT | Mod: CPTII,S$GLB,, | Performed by: NURSE PRACTITIONER

## 2023-12-05 PROCEDURE — 1159F PR MEDICATION LIST DOCUMENTED IN MEDICAL RECORD: ICD-10-PCS | Mod: CPTII,S$GLB,, | Performed by: NURSE PRACTITIONER

## 2023-12-05 PROCEDURE — 36415 COLL VENOUS BLD VENIPUNCTURE: CPT | Performed by: NURSE PRACTITIONER

## 2023-12-05 PROCEDURE — 99214 OFFICE O/P EST MOD 30 MIN: CPT | Mod: S$GLB,,, | Performed by: NURSE PRACTITIONER

## 2023-12-05 PROCEDURE — 3288F FALL RISK ASSESSMENT DOCD: CPT | Mod: CPTII,S$GLB,, | Performed by: NURSE PRACTITIONER

## 2023-12-05 PROCEDURE — 1101F PR PT FALLS ASSESS DOC 0-1 FALLS W/OUT INJ PAST YR: ICD-10-PCS | Mod: CPTII,S$GLB,, | Performed by: NURSE PRACTITIONER

## 2023-12-05 PROCEDURE — 1101F PT FALLS ASSESS-DOCD LE1/YR: CPT | Mod: CPTII,S$GLB,, | Performed by: NURSE PRACTITIONER

## 2023-12-05 PROCEDURE — 99214 PR OFFICE/OUTPT VISIT, EST, LEVL IV, 30-39 MIN: ICD-10-PCS | Mod: S$GLB,,, | Performed by: NURSE PRACTITIONER

## 2023-12-05 PROCEDURE — 1157F PR ADVANCE CARE PLAN OR EQUIV PRESENT IN MEDICAL RECORD: ICD-10-PCS | Mod: CPTII,S$GLB,, | Performed by: NURSE PRACTITIONER

## 2023-12-05 PROCEDURE — 1125F AMNT PAIN NOTED PAIN PRSNT: CPT | Mod: CPTII,S$GLB,, | Performed by: NURSE PRACTITIONER

## 2023-12-05 PROCEDURE — 3075F SYST BP GE 130 - 139MM HG: CPT | Mod: CPTII,S$GLB,, | Performed by: NURSE PRACTITIONER

## 2023-12-05 PROCEDURE — 1157F ADVNC CARE PLAN IN RCRD: CPT | Mod: CPTII,S$GLB,, | Performed by: NURSE PRACTITIONER

## 2023-12-05 NOTE — PROGRESS NOTES
"Clinic Follow Up:  [facility] Follow Up Note    Reason for Follow Up:  The primary encounter diagnosis was Lower abdominal pain. Diagnoses of HCC (hepatocellular carcinoma) and Dilation of common bile duct were also pertinent to this visit.    PCP: Bhupinder Callaway       HPI:  This is a 78 y.o. male here for follow up of the above  Pt previously seen by Dr. Wood. Today is my first visit with him.   He has a known dx of HCC that was previously treated with an ablation.   Imaging in September showed good response to treatment without any residual HCC.     He developed lower abdominal pain a few weeks ago without clear etiology.   He describes pain as moderate to severe, "just a pain", not associated with PO intake or BM. No relieving factors. Intermittent.   Was seen by PCP and x-ray was completed showing constipation.  He was started on miralax and has had good BMs without any change in the pain   US was completed which showed  Common hepatic duct or cystic duct is dilated, 1.2 cm.  Common bile duct is normal in diameter, 3 mm.  No obstructing lesion is demonstrated.  Correlation with lab chemistries is recommended to assess for biliary obstruction.  Gallbladder has a normal appearance.  Further evaluation could be considered with either ERCP or MRCP.  Other findings as above to include posttreatment changes in the right hepatic lobe.      Review of Systems   Constitutional:  Negative for chills, fever, malaise/fatigue and weight loss.   Respiratory:  Negative for cough.    Cardiovascular:  Negative for chest pain.   Gastrointestinal:         Per HPI   Musculoskeletal:  Negative for myalgias.   Skin:  Negative for itching and rash.   Neurological:  Negative for headaches.   Psychiatric/Behavioral:  The patient is nervous/anxious.        Medical History:  Past Medical History:   Diagnosis Date    Aortic stenosis     Asthma     Benign prostatic hyperplasia with nocturia     Bilateral carotid " artery stenosis 7/21/2021    US CAROTID BILATERAL 07/14/2021 IMPRESSION: Atherosclerosis with suspected stenosis greater than 50% at the right proximal ICA visually. Velocities are discordant and appear improved from prior. Atherosclerosis on the left with no evidence of flow-limiting stenosis greater than 50%.  FINDINGS: Right: Internal Carotid Artery (ICA): Peak systolic velocity 118 cm/sec. End diastolic velocity 35 cm/sec    BPH (benign prostatic hyperplasia)     CAD (coronary artery disease)     40% lesion 2002;violet    Cirrhosis     Claudication 4/9/2014    Colon polyp     COPD (chronic obstructive pulmonary disease)     ED (erectile dysfunction)     Encounter for blood transfusion     Ex-smoker     Hearing loss NEC     Hepatitis C     Cured;reji brown 2015    Hyperlipidemia     Hypertension     Hypothyroid     s/p tx graves    Open angle with borderline findings and low glaucoma risk in both eyes 9/22/2020    DELONTE on CPAP     Osteoarthritis     PVD (peripheral vascular disease)     Secondary esophageal varices without bleeding 6/26/2017    Type 2 diabetes mellitus        Surgical History:   Past Surgical History:   Procedure Laterality Date    CARDIAC CATHETERIZATION      CARDIAC SURGERY      CARPAL TUNNEL RELEASE Left     CATARACT EXTRACTION      CATARACT EXTRACTION W/ INTRAOCULAR LENS  IMPLANT, BILATERAL  2008    CATHETERIZATION OF BOTH LEFT AND RIGHT HEART N/A 11/2/2018    Procedure: CATHETERIZATION, HEART, BOTH LEFT AND RIGHT;  Surgeon: Frank Gallardo MD;  Location: [facility] CATH LAB;  Service: Cardiology;  Laterality: N/A;    COLONOSCOPY  8/2013    COLONOSCOPY N/A 5/30/2016    Procedure: COLONOSCOPY;  Surgeon: Anna Tomas MD;  Location: [facility] ENDO;  Service: Endoscopy;  Laterality: N/A;    COLONOSCOPY N/A 7/17/2019    Procedure: COLONOSCOPY;  Surgeon: David Carter III, MD;  Location: [facility] ENDO;  Service: Endoscopy;  Laterality: N/A;    COMPUTED TOMOGRAPHY N/A 9/9/2019    Procedure: CT  (COMPUTED TOMOGRAPHY);  Surgeon: Sri Diagnostic Provider;  Location: [facility] OR;  Service: General;  Laterality: N/A;  Microwave ablation to be done in CT.  Need CRNA and cart    COMPUTED TOMOGRAPHY N/A 1/25/2022    Procedure: Liver Microwave Ablation;  Surgeon: Red Puentes MD;  Location: [facility] TOM;  Service: General;  Laterality: N/A;    CORONARY ARTERY BYPASS GRAFT (CABG) N/A 11/5/2018    Procedure: CORONARY ARTERY BYPASS GRAFT (CABG);  Surgeon: Bear De Luna MD;  Location: [facility] OR;  Service: Cardiothoracic;  Laterality: N/A;  TWO VESSEL BYPASS WITH AORTOTOMY AND EXCISION OF ATHEROSCLEROTIC PLAQUE    CORONARY BYPASS GRAFT ANGIOGRAPHY  3/2/2020    Procedure: Bypass graft study;  Surgeon: Cora Cormier MD;  Location: [facility] CATH LAB;  Service: Cardiology;;    ELBOW BURSA SURGERY Right 2010    ENDOSCOPIC HARVEST OF VEIN Left 11/5/2018    Procedure: SURGICAL PROCUREMENT, VEIN, ENDOSCOPIC;  Surgeon: Bear De Luna MD;  Location: [facility];  Service: Cardiothoracic;  Laterality: Left;    ESOPHAGOGASTRODUODENOSCOPY N/A 7/17/2019    Procedure: ESOPHAGOGASTRODUODENOSCOPY (EGD);  Surgeon: David Carter III, MD;  Location: [facility] ENDO;  Service: Endoscopy;  Laterality: N/A;    ESOPHAGOGASTRODUODENOSCOPY N/A 12/29/2022    Procedure: ESOPHAGOGASTRODUODENOSCOPY (EGD);  Surgeon: Issa Gayle MD;  Location: [facility] ENDO;  Service: Endoscopy;  Laterality: N/A;    ETHMOIDECTOMY Bilateral 3/27/2019    Procedure: ETHMOIDECTOMY;  Surgeon: Alejandro Parkinson MD;  Location: [facility] OR;  Service: ENT;  Laterality: Bilateral;    EYE SURGERY      FOOT SURGERY      FRONTAL SINUS OBLITERATION Bilateral 3/27/2019    Procedure: SINUSOTOMY, FRONTAL SINUS, OBLITERATIVE;  Surgeon: Alejandro Parkinson MD;  Location: [facility];  Service: ENT;  Laterality: Bilateral;    FUNCTIONAL ENDOSCOPIC SINUS SURGERY (FESS) Bilateral 3/27/2019    Procedure: FESS (FUNCTIONAL ENDOSCOPIC SINUS SURGERY);  Surgeon: Alejandro Parkinson MD;  Location: [facility] OR;  Service: ENT;   Laterality: Bilateral;  Left Keila bullosa resection     KNEE ARTHROSCOPY W/ MENISCAL REPAIR Left 2019    dr boykin    KNEE SURGERY Right     LEFT HEART CATHETERIZATION Left 3/2/2020    Procedure: CATHETERIZATION, HEART, LEFT;  Surgeon: Cora Cormier MD;  Location: [facility] CATH LAB;  Service: Cardiology;  Laterality: Left;    LIVER BIOPSY  2022    MAXILLARY ANTROSTOMY Bilateral 3/27/2019    Procedure: MAXILLARY ANTROSTOMY;  Surgeon: Alejandro Parkinson MD;  Location: [facility] OR;  Service: ENT;  Laterality: Bilateral;    NASAL SEPTOPLASTY N/A 3/27/2019    Procedure: SEPTOPLASTY, NOSE;  Surgeon: Alejandro Parkinson MD;  Location: [facility] OR;  Service: ENT;  Laterality: N/A;    RECTAL SURGERY      TRANSURETHRAL RESECTION OF PROSTATE (TURP) WITHOUT USE OF LASER N/A 2018    Procedure: TURP, WITHOUT USING LASER;  Surgeon: Cooper Cordova IV, MD;  Location: [facility] OR;  Service: Urology;  Laterality: N/A;    TRIGGER FINGER RELEASE Left        Family History:   Family History   Problem Relation Age of Onset    Heart disease Mother     Heart disease Father     Heart disease Brother     Colon cancer Neg Hx        Social History:   Social History     Tobacco Use    Smoking status: Former     Current packs/day: 0.00     Average packs/day: 0.5 packs/day for 4.0 years (2.0 ttl pk-yrs)     Types: Cigarettes     Start date: 1968     Quit date: 1972     Years since quittin.5    Smokeless tobacco: Former     Types: Chew     Quit date: 1976   Substance Use Topics    Alcohol use: Yes     Alcohol/week: 2.0 standard drinks of alcohol     Types: 2 Cans of beer per week    Drug use: No       Allergies: Reviewed    Home Medications:  Current Outpatient Medications on File Prior to Visit   Medication Sig Dispense Refill    ACCU-CHEK GRACE PLUS METER Misc AS DIRECTED 1 each 0    albuterol (VENTOLIN HFA) 90 mcg/actuation inhaler Inhale 2 puffs into the lungs every 6 (six) hours as needed for Wheezing or Shortness of Breath. Rescue  "18 g 3    blood sugar diagnostic (ACCU-CHEK GRACE PLUS TEST STRP) Strp TEST THREE TIMES A  strip 11    budesonide-formoterol 160-4.5 mcg (SYMBICORT) 160-4.5 mcg/actuation HFAA INHALE 2 PUFFS INTO THE LUNGS TWO TIMES A DAY. 10.2 g 11    carvediloL (COREG) 3.125 MG tablet Take 1 tablet (3.125 mg total) by mouth 2 (two) times daily with meals. 60 tablet 5    empagliflozin (JARDIANCE) 25 mg tablet Take 1 tablet (25 mg total) by mouth once daily. 90 tablet 1    finasteride (PROSCAR) 5 mg tablet TAKE 1 TABLET BY MOUTH once daily 90 tablet 2    fluticasone propionate (FLONASE) 50 mcg/actuation nasal spray 2 sprays (100 mcg total) by Each Nostril route once daily. 18.2 mL 6    GLUCOSAMINE HCL/CHONDR ROSARIO A NA (OSTEO BI-FLEX ORAL) Take 1 tablet by mouth 2 (two) times daily.       insulin (LANTUS SOLOSTAR U-100 INSULIN) glargine 100 units/mL SubQ pen Inject 44 Units into the skin every evening. 45 mL 1    ipratropium (ATROVENT) 21 mcg (0.03 %) nasal spray 2 sprays by Each Nostril route 2 (two) times daily. 30 mL 0    krill oil 500 mg Cap Take by mouth.      lancets (ACCU-CHEK FASTCLIX LANCET DRUM) Misc TEST TWO TIMES A  each 5    levocetirizine (XYZAL) 5 MG tablet Take 1 tablet (5 mg total) by mouth every evening. 30 tablet 11    levothyroxine (SYNTHROID) 300 MCG Tab Take 1 tablet (300 mcg total) by mouth every morning. 90 tablet 3    lisinopriL 10 MG tablet TAKE 1 BY MOUTH EVERY DAY (Patient taking differently: 40 mg.) 30 tablet 7    meloxicam (MOBIC) 15 MG tablet Take 15 mg by mouth.      metFORMIN (GLUCOPHAGE-XR) 500 MG ER 24hr tablet Take 1 tablet (500 mg total) by mouth 2 (two) times daily with meals. 180 tablet 3    montelukast (SINGULAIR) 10 mg tablet TAKE 1 TABLET BY MOUTH ONCE DAILY 30 tablet 11    pen needle, diabetic (BD ULTRA-FINE MINI PEN NEEDLE) 31 gauge x 3/16" Ndle USE AS DIRECTED 100 each 11    propafenone (RHTHYMOL) 150 MG Tab Take 1 tablet (150 mg total) by mouth every 8 (eight) hours. 90 tablet " "11    RABEprazole (ACIPHEX) 20 mg tablet Take 1 tablet (20 mg total) by mouth 2 (two) times daily. 180 tablet 3    REPATHA SURECLICK 140 mg/mL PnIj SMARTSI Milligram(s) Topical Every 2 Weeks      rivaroxaban (XARELTO) 20 mg Tab Take 1 tablet (20 mg total) by mouth daily with dinner or evening meal. 30 tablet 11    sildenafiL (VIAGRA) 100 MG tablet Take 1/2 to 1 tablet by mouth one hour prior to intercourse. Max 100mg per day 30 tablet 11    amLODIPine (NORVASC) 10 MG tablet TAKE 1 TABLET BY MOUTH ONCE A DAY (DOSE INCREASE) 30 tablet 11    aspirin (ECOTRIN) 81 MG EC tablet TAKE 1 TABLET BY MOUTH EVERY DAY (Patient not taking: Reported on 2023) 90 tablet 4    azelastine (ASTELIN) 137 mcg (0.1 %) nasal spray 2 sprays (274 mcg total) by Nasal route 2 (two) times daily. 30 mL 6    famotidine (PEPCID) 40 MG tablet TAKE 1 TABLET BY MOUTH EVERY EVENING 30 tablet 11    furosemide (LASIX) 40 MG tablet Take 1 tablet (40 mg total) by mouth 2 (two) times daily. 60 tablet 11    methylPREDNISolone (MEDROL DOSEPACK) 4 mg tablet use as directed (Patient not taking: Reported on 2023) 21 tablet 0    methylPREDNISolone (MEDROL DOSEPACK) 4 mg tablet use as directed (Patient not taking: Reported on 2023) 21 tablet 0    neomycin-polymyxin-dexamethasone (DEXACINE) 3.5 mg/g-10,000 unit/g-0.1 % Oint Place into both eyes 2 (two) times a day. Apply to lid margins (Patient not taking: Reported on 2023) 3.5 g 0     Current Facility-Administered Medications on File Prior to Visit   Medication Dose Route Frequency Provider Last Rate Last Admin    lactated ringers infusion   Intravenous Continuous Omaira Theodore MD   New Bag at 19 1117       Physical Exam:  Vital Signs:  /82 (BP Location: Left arm, Patient Position: Sitting, BP Method: Small (Automatic))   Pulse 99   Ht 5' 7" (1.702 m)   Wt 103.1 kg (227 lb 4.7 oz)   BMI 35.60 kg/m²   Body mass index is 35.6 kg/m².  Physical Exam  Vitals reviewed. "   Constitutional:       Appearance: He is well-developed.   HENT:      Head: Normocephalic.   Eyes:      General: No scleral icterus.  Cardiovascular:      Rate and Rhythm: Normal rate.   Pulmonary:      Effort: Pulmonary effort is normal.      Breath sounds: Normal breath sounds.   Abdominal:      General: There is no distension.      Tenderness: There is no abdominal tenderness.   Musculoskeletal:         General: Normal range of motion.      Cervical back: Normal range of motion.   Skin:     General: Skin is warm and dry.   Neurological:      Mental Status: He is alert and oriented to person, place, and time.         Labs: Pertinent labs reviewed.      Assessment:  1. Lower abdominal pain    2. HCC (hepatocellular carcinoma)    3. Dilation of common bile duct        Recommendations:  Unclear etiology of the pain.   - given the hx of HCC and results on the US, will plan for an updated labs and MRI/MRCP for further evaluation.   Continue the miralax   Pt with a hx of colon polyps in 2019,  he would be due for colonoscopy in 2024.  Pt is concerned about this pain and hx of cancer and is requesting colonoscopy for further evaluation.  PAT placed.         Return to Clinic:    As previously planned, sooner based on results of above

## 2023-12-07 ENCOUNTER — HOSPITAL ENCOUNTER (OUTPATIENT)
Dept: PREADMISSION TESTING | Facility: HOSPITAL | Age: 78
Discharge: HOME | End: 2023-12-07
Attending: FAMILY MEDICINE
Payer: MEDICARE

## 2023-12-07 DIAGNOSIS — R10.30 LOWER ABDOMINAL PAIN: Primary | ICD-10-CM

## 2023-12-07 RX ORDER — SODIUM, POTASSIUM,MAG SULFATES 17.5-3.13G
1 SOLUTION, RECONSTITUTED, ORAL ORAL DAILY
Qty: 1 KIT | Refills: 0 | Status: SHIPPED | OUTPATIENT
Start: 2023-12-07 | End: 2023-12-09

## 2023-12-08 ENCOUNTER — TELEPHONE (OUTPATIENT)
Dept: PREADMISSION TESTING | Facility: HOSPITAL | Age: 78
End: 2023-12-08
Payer: MEDICARE

## 2023-12-08 ENCOUNTER — PATIENT MESSAGE (OUTPATIENT)
Dept: PREADMISSION TESTING | Facility: HOSPITAL | Age: 78
End: 2023-12-08
Payer: MEDICARE

## 2023-12-08 NOTE — TELEPHONE ENCOUNTER
Spoke with pt and gave him instructions on how to see his procedure instructions in [facility].  Pt will try and will call back if he needs anything else.

## 2023-12-08 NOTE — TELEPHONE ENCOUNTER
----- Message from Blank Kaplan sent at 12/8/2023  3:32 PM CST -----  Contact: krystina Krishna is calling regarding instructions for colonoscopy.  Please give him a call back at [phone]

## 2023-12-10 ENCOUNTER — E-CONSULT (OUTPATIENT)
Dept: CARDIOLOGY | Facility: CLINIC | Age: 78
End: 2023-12-10
Payer: MEDICARE

## 2023-12-10 DIAGNOSIS — K21.00 GASTROESOPHAGEAL REFLUX DISEASE WITH ESOPHAGITIS WITHOUT HEMORRHAGE: ICD-10-CM

## 2023-12-10 DIAGNOSIS — I49.3 PVC (PREMATURE VENTRICULAR CONTRACTION): ICD-10-CM

## 2023-12-10 DIAGNOSIS — I65.23 BILATERAL CAROTID ARTERY STENOSIS: Chronic | ICD-10-CM

## 2023-12-10 DIAGNOSIS — Z95.1 S/P CABG X 2: ICD-10-CM

## 2023-12-10 DIAGNOSIS — E78.2 MIXED HYPERLIPIDEMIA: ICD-10-CM

## 2023-12-10 DIAGNOSIS — G47.33 OSA ON CPAP: Chronic | ICD-10-CM

## 2023-12-10 DIAGNOSIS — I35.0 NONRHEUMATIC AORTIC VALVE STENOSIS: Chronic | ICD-10-CM

## 2023-12-10 DIAGNOSIS — J45.20 MILD INTERMITTENT ASTHMA WITHOUT COMPLICATION: ICD-10-CM

## 2023-12-10 DIAGNOSIS — K74.69 OTHER CIRRHOSIS OF LIVER: ICD-10-CM

## 2023-12-10 PROCEDURE — 99451 PR INTERPROF, PHONE/INTERNET/EHR, CONSULT, >= 5MINS: ICD-10-PCS | Mod: S$GLB,,, | Performed by: INTERNAL MEDICINE

## 2023-12-10 PROCEDURE — 99451 NTRPROF PH1/NTRNET/EHR 5/>: CPT | Mod: S$GLB,,, | Performed by: INTERNAL MEDICINE

## 2023-12-10 NOTE — CONSULTS
[facility]  Response for E-Consult     Patient Name: Erendira Pretty  MRN: [id number]  Primary Care Provider: Bhupinder Callaway MD   Requesting Provider: Halina Villaseñor NP  Consults    Recommendation:Aortic stenosis mild     Bilateral carotid artery stenosis   PVC (premature ventricular contraction)   PVD (peripheral vascular disease)   Chest pain, moderate coronary artery risk   Angina pectoris   Left main coronary artery disease   Mixed hyperlipidemia   S/P CABG x 2   Aortic atherosclerosis   Paroxysmal atrial fibrillation   Contingency: 77 y/o male with PMHx:  Aortic stenosis mild   Bilateral carotid artery stenosis   PVC (premature ventricular contraction)   PVD (peripheral vascular disease)   Chest pain, moderate coronary artery risk   Angina pectoris   Left main coronary artery disease   Mixed hyperlipidemia   S/P CABG x 2   Aortic atherosclerosis   Paroxysmal atrial fibrillation     Refereed for CV clearance for c scope. Pt last seen in 11-23 clinic without sx.Aortic stenosis mild     Bilateral carotid artery stenosis   PVC (premature ventricular contraction)   PVD (peripheral vascular disease)   Chest pain, moderate coronary artery risk   Angina pectoris   Left main coronary artery disease   Mixed hyperlipidemia   S/P CABG x 2   Aortic atherosclerosis   Paroxysmal atrial fibrillation     Total time of Consultation: 10 minute    I did not speak to the requesting provider verbally about this.     *This eConsult is based on the clinical data available to me and is furnished without benefit of a physical examination. The eConsult will need to be interpreted in light of any clinical issues or changes in patient status not available to me at the time of filing this eConsults. Significant changes in patient condition or level of acuity should result in immediate formal consultation and reevaluation. Please alert me if you have further questions.    Thank you for this eConsult referral.     Frank NARANJO  MD Sami  [facility]

## 2023-12-11 NOTE — CONSULTS
[facility]  Response for E-Consult     Patient Name: Erendira Pretty  MRN: [id number]  Primary Care Provider: Bhupinder Callaway MD   Requesting Provider: Halina Villaseñor NP  Consults    Recommendation: Pt cleared for procedure/surgery at moderate CV risk .  Pt can Hold xarelto 48 hours before procedure.    Contingency: 79 y/o male with PMHx for CAD/CABG, HTN, HLP, liver cirrohsis, PAF, PAD last seen in CV clinic withim last month without Cv sx.Pt cleared for procedure/surgery at moderate CV risk .  Pt can Hold xarelto 48 hours before procedure.      Total time of Consultation: 10 minute    I did not speak to the requesting provider verbally about this.     *This eConsult is based on the clinical data available to me and is furnished without benefit of a physical examination. The eConsult will need to be interpreted in light of any clinical issues or changes in patient status not available to me at the time of filing this eConsults. Significant changes in patient condition or level of acuity should result in immediate formal consultation and reevaluation. Please alert me if you have further questions.    Thank you for this eConsult referral.     Frank Gallardo MD  [facility]

## 2023-12-12 ENCOUNTER — HOSPITAL ENCOUNTER (OUTPATIENT)
Dept: RADIOLOGY | Facility: HOSPITAL | Age: 78
Discharge: HOME | End: 2023-12-12
Attending: NURSE PRACTITIONER
Payer: MEDICARE

## 2023-12-12 DIAGNOSIS — C22.0 HCC (HEPATOCELLULAR CARCINOMA): ICD-10-CM

## 2023-12-12 DIAGNOSIS — K83.8 DILATION OF COMMON BILE DUCT: ICD-10-CM

## 2023-12-12 PROCEDURE — 25500020 PHARM REV CODE 255: Mod: PN | Performed by: NURSE PRACTITIONER

## 2023-12-12 PROCEDURE — 74183 MRI ABD W/O CNTR FLWD CNTR: CPT | Mod: TC,PN

## 2023-12-12 PROCEDURE — A9585 GADOBUTROL INJECTION: HCPCS | Mod: PN | Performed by: NURSE PRACTITIONER

## 2023-12-12 PROCEDURE — 76376 3D RENDER W/INTRP POSTPROCES: CPT | Mod: TC,PN

## 2023-12-12 PROCEDURE — 74183 MRI ABDOMEN WITH AND WO_INC MRCP (XPD): ICD-10-PCS | Mod: 26,,, | Performed by: RADIOLOGY

## 2023-12-12 PROCEDURE — 76376 3D RENDER W/INTRP POSTPROCES: CPT | Mod: 26,,, | Performed by: RADIOLOGY

## 2023-12-12 PROCEDURE — 74183 MRI ABD W/O CNTR FLWD CNTR: CPT | Mod: 26,,, | Performed by: RADIOLOGY

## 2023-12-12 PROCEDURE — 76376 MRI ABDOMEN WITH AND WO_INC MRCP (XPD): ICD-10-PCS | Mod: 26,,, | Performed by: RADIOLOGY

## 2023-12-12 RX ORDER — GADOBUTROL 604.72 MG/ML
10 INJECTION INTRAVENOUS
Status: COMPLETED | OUTPATIENT
Start: 2023-12-12 | End: 2023-12-12

## 2023-12-12 RX ADMIN — GADOBUTROL 10 ML: 604.72 INJECTION INTRAVENOUS at 08:12

## 2023-12-13 PROBLEM — R10.30 LOWER ABDOMINAL PAIN: Status: ACTIVE | Noted: 2023-12-13

## 2023-12-14 ENCOUNTER — ANESTHESIA EVENT (OUTPATIENT)
Dept: ENDOSCOPY | Facility: HOSPITAL | Age: 78
End: 2023-12-14
Payer: MEDICARE

## 2023-12-14 ENCOUNTER — ANESTHESIA (OUTPATIENT)
Dept: ENDOSCOPY | Facility: HOSPITAL | Age: 78
End: 2023-12-14
Payer: MEDICARE

## 2023-12-14 ENCOUNTER — HOSPITAL ENCOUNTER (OUTPATIENT)
Facility: HOSPITAL | Age: 78
Discharge: HOME | End: 2023-12-14
Attending: INTERNAL MEDICINE | Admitting: INTERNAL MEDICINE
Payer: MEDICARE

## 2023-12-14 DIAGNOSIS — R10.30 LOWER ABDOMINAL PAIN: ICD-10-CM

## 2023-12-14 LAB
GLUCOSE SERPL-MCNC: 112 MG/DL (ref 70–110)
POCT GLUCOSE: 112 MG/DL (ref 70–110)

## 2023-12-14 PROCEDURE — 88305 TISSUE EXAM BY PATHOLOGIST: ICD-10-PCS | Mod: 26,,, | Performed by: PATHOLOGY

## 2023-12-14 PROCEDURE — 45380 COLONOSCOPY AND BIOPSY: CPT | Mod: ,,, | Performed by: INTERNAL MEDICINE

## 2023-12-14 PROCEDURE — 27201012 HC FORCEPS, HOT/COLD, DISP: Performed by: INTERNAL MEDICINE

## 2023-12-14 PROCEDURE — 88305 TISSUE EXAM BY PATHOLOGIST: CPT | Mod: 26,,, | Performed by: PATHOLOGY

## 2023-12-14 PROCEDURE — 25000003 PHARM REV CODE 250: Performed by: INTERNAL MEDICINE

## 2023-12-14 PROCEDURE — 37000008 HC ANESTHESIA 1ST 15 MINUTES: Performed by: INTERNAL MEDICINE

## 2023-12-14 PROCEDURE — 45380 PR COLONOSCOPY,BIOPSY: ICD-10-PCS | Mod: ,,, | Performed by: INTERNAL MEDICINE

## 2023-12-14 PROCEDURE — 25000003 PHARM REV CODE 250: Performed by: NURSE ANESTHETIST, CERTIFIED REGISTERED

## 2023-12-14 PROCEDURE — 37000009 HC ANESTHESIA EA ADD 15 MINS: Performed by: INTERNAL MEDICINE

## 2023-12-14 PROCEDURE — 45380 COLONOSCOPY AND BIOPSY: CPT | Performed by: INTERNAL MEDICINE

## 2023-12-14 PROCEDURE — 88305 TISSUE EXAM BY PATHOLOGIST: CPT | Mod: 59 | Performed by: PATHOLOGY

## 2023-12-14 PROCEDURE — 63600175 PHARM REV CODE 636 W HCPCS: Performed by: NURSE ANESTHETIST, CERTIFIED REGISTERED

## 2023-12-14 RX ORDER — DIPHENHYDRAMINE HCL 50 MG
CAPSULE ORAL
Qty: 1 CAPSULE | Refills: 0 | Status: ON HOLD | OUTPATIENT
Start: 2023-12-14 | End: 2024-01-26 | Stop reason: HOSPADM

## 2023-12-14 RX ORDER — SODIUM CHLORIDE, SODIUM LACTATE, POTASSIUM CHLORIDE, CALCIUM CHLORIDE 600; 310; 30; 20 MG/100ML; MG/100ML; MG/100ML; MG/100ML
INJECTION, SOLUTION INTRAVENOUS CONTINUOUS PRN
Status: DISCONTINUED | OUTPATIENT
Start: 2023-12-14 | End: 2023-12-14

## 2023-12-14 RX ORDER — PHENYLEPHRINE HYDROCHLORIDE 10 MG/ML
INJECTION INTRAVENOUS
Status: DISCONTINUED | OUTPATIENT
Start: 2023-12-14 | End: 2023-12-14

## 2023-12-14 RX ORDER — DEXTROMETHORPHAN/PSEUDOEPHED 2.5-7.5/.8
DROPS ORAL
Status: COMPLETED | OUTPATIENT
Start: 2023-12-14 | End: 2023-12-14

## 2023-12-14 RX ORDER — PROPOFOL 10 MG/ML
VIAL (ML) INTRAVENOUS
Status: DISCONTINUED | OUTPATIENT
Start: 2023-12-14 | End: 2023-12-14

## 2023-12-14 RX ORDER — PREDNISONE 50 MG/1
TABLET ORAL
Qty: 3 TABLET | Refills: 0 | Status: SHIPPED | OUTPATIENT
Start: 2023-12-14 | End: 2024-01-16

## 2023-12-14 RX ORDER — LIDOCAINE HYDROCHLORIDE 10 MG/ML
INJECTION, SOLUTION EPIDURAL; INFILTRATION; INTRACAUDAL; PERINEURAL
Status: DISCONTINUED | OUTPATIENT
Start: 2023-12-14 | End: 2023-12-14

## 2023-12-14 RX ADMIN — Medication 30 MG: at 08:12

## 2023-12-14 RX ADMIN — SODIUM CHLORIDE, SODIUM LACTATE, POTASSIUM CHLORIDE, AND CALCIUM CHLORIDE: 600; 310; 30; 20 INJECTION, SOLUTION INTRAVENOUS at 07:12

## 2023-12-14 RX ADMIN — PHENYLEPHRINE HYDROCHLORIDE 50 MCG: 10 INJECTION INTRAVENOUS at 07:12

## 2023-12-14 RX ADMIN — Medication 50 MG: at 07:12

## 2023-12-14 RX ADMIN — Medication 30 MG: at 07:12

## 2023-12-14 RX ADMIN — LIDOCAINE HYDROCHLORIDE 50 MG: 10 SOLUTION INTRAVENOUS at 07:12

## 2023-12-14 RX ADMIN — PHENYLEPHRINE HYDROCHLORIDE 100 MCG: 10 INJECTION INTRAVENOUS at 08:12

## 2023-12-14 NOTE — TRANSFER OF CARE
"Anesthesia Transfer of Care Note    Patient: Erendira Pretty    Procedure(s) Performed: Procedure(s) (LRB):  COLONOSCOPY (N/A)    Patient location: GI    Anesthesia Type: MAC    Transport from OR: Transported from OR on room air with adequate spontaneous ventilation    Post pain: adequate analgesia    Post assessment: no apparent anesthetic complications and tolerated procedure well    Post vital signs: stable    Level of consciousness: responds to stimulation    Nausea/Vomiting: no nausea/vomiting    Complications: none    Transfer of care protocol was followed      Last vitals: Visit Vitals  BP (!) 143/70 (BP Location: Left arm, Patient Position: Lying)   Pulse 74   Temp 37.1 °C (98.8 °F) (Temporal)   Resp 18   Ht 5' 7" (1.702 m)   Wt 102.1 kg (225 lb)   SpO2 97%   BMI 35.24 kg/m²     "

## 2023-12-14 NOTE — ANESTHESIA POSTPROCEDURE EVALUATION
Anesthesia Post Evaluation    Patient: Erendira Pretty    Procedure(s) Performed: Procedure(s) (LRB):  COLONOSCOPY (N/A)    Final Anesthesia Type: MAC      Patient location during evaluation: GI PACU  Patient participation: Yes- Able to Participate  Level of consciousness: awake and alert  Post-procedure vital signs: reviewed and stable  Pain management: adequate  Airway patency: patent    PONV status at discharge: No PONV  Anesthetic complications: no      Cardiovascular status: stable  Respiratory status: unassisted and spontaneous ventilation  Hydration status: euvolemic  Follow-up not needed.              Vitals Value Taken Time   BP  12/14/23 0827   Temp  12/14/23 0827   Pulse  12/14/23 0827   Resp  12/14/23 0827   SpO2  12/14/23 0827         No case tracking events are documented in the log.      Pain/Natalie Score: No data recorded

## 2023-12-14 NOTE — H&P
PRE PROCEDURE H&P    Patient Name: Erendira Pretty  MRN: [id number]  : 1945  Date of Procedure:  2023  Referring Physician: Jess Reardon FNP  Primary Physician: Bhupinder Callaway MD  Procedure Physician: Ama Barrera MD       Planned Procedure: Colonoscopy  Diagnosis: lower abdominal pain, hx of adenomatous polyps    Chief Complaint: Same as above    HPI: Patient is an 78 y.o. male is here for the above. Referred by Ms. Jess MARIN for lower abdominal pain. Last colonoscopy . TA removed in transverse colon. No Fhx of CRC. Stopped Xarelto 5 days ago.    Last colonoscopy:   Family history: none  Anticoagulation: Xarelto, 5 days ago    Past Medical History:   Past Medical History:   Diagnosis Date    Aortic stenosis     Asthma     Benign prostatic hyperplasia with nocturia     Bilateral carotid artery stenosis 2021    US CAROTID BILATERAL 2021 IMPRESSION: Atherosclerosis with suspected stenosis greater than 50% at the right proximal ICA visually. Velocities are discordant and appear improved from prior. Atherosclerosis on the left with no evidence of flow-limiting stenosis greater than 50%.  FINDINGS: Right: Internal Carotid Artery (ICA): Peak systolic velocity 118 cm/sec. End diastolic velocity 35 cm/sec    BPH (benign prostatic hyperplasia)     CAD (coronary artery disease)     40% lesion ;violet    Cirrhosis     Claudication 2014    Colon polyp     COPD (chronic obstructive pulmonary disease)     ED (erectile dysfunction)     Encounter for blood transfusion     Ex-smoker     Hearing loss NEC     Hepatitis C     Cured;reji brown     Hyperlipidemia     Hypertension     Hypothyroid     s/p tx graves    Open angle with borderline findings and low glaucoma risk in both eyes 2020    DELONTE on CPAP     Osteoarthritis     PVD (peripheral vascular disease)     Secondary esophageal varices without bleeding 2017    Type 2 diabetes mellitus         Past Surgical  History:  Past Surgical History:   Procedure Laterality Date    CARDIAC CATHETERIZATION      CARDIAC SURGERY      CARPAL TUNNEL RELEASE Left     CATARACT EXTRACTION      CATARACT EXTRACTION W/ INTRAOCULAR LENS  IMPLANT, BILATERAL  2008    CATHETERIZATION OF BOTH LEFT AND RIGHT HEART N/A 11/2/2018    Procedure: CATHETERIZATION, HEART, BOTH LEFT AND RIGHT;  Surgeon: Frank Gallardo MD;  Location: [facility] CATH LAB;  Service: Cardiology;  Laterality: N/A;    COLONOSCOPY  8/2013    COLONOSCOPY N/A 5/30/2016    Procedure: COLONOSCOPY;  Surgeon: Anna Tomas MD;  Location: [facility] ENDO;  Service: Endoscopy;  Laterality: N/A;    COLONOSCOPY N/A 7/17/2019    Procedure: COLONOSCOPY;  Surgeon: David Carter III, MD;  Location: [facility] ENDO;  Service: Endoscopy;  Laterality: N/A;    COMPUTED TOMOGRAPHY N/A 9/9/2019    Procedure: CT (COMPUTED TOMOGRAPHY);  Surgeon: [facility] Diagnostic Provider;  Location: [facility] OR;  Service: General;  Laterality: N/A;  Microwave ablation to be done in CT.  Need CRNA and cart    COMPUTED TOMOGRAPHY N/A 1/25/2022    Procedure: Liver Microwave Ablation;  Surgeon: Red Puentes MD;  Location: [facility];  Service: General;  Laterality: N/A;    CORONARY ARTERY BYPASS GRAFT (CABG) N/A 11/5/2018    Procedure: CORONARY ARTERY BYPASS GRAFT (CABG);  Surgeon: Bear De Luna MD;  Location: [facility];  Service: Cardiothoracic;  Laterality: N/A;  TWO VESSEL BYPASS WITH AORTOTOMY AND EXCISION OF ATHEROSCLEROTIC PLAQUE    CORONARY BYPASS GRAFT ANGIOGRAPHY  3/2/2020    Procedure: Bypass graft study;  Surgeon: Cora Cormier MD;  Location: [facility] CATH LAB;  Service: Cardiology;;    ELBOW BURSA SURGERY Right 2010    ENDOSCOPIC HARVEST OF VEIN Left 11/5/2018    Procedure: SURGICAL PROCUREMENT, VEIN, ENDOSCOPIC;  Surgeon: Bear De Luna MD;  Location: [facility] OR;  Service: Cardiothoracic;  Laterality: Left;    ESOPHAGOGASTRODUODENOSCOPY N/A 7/17/2019    Procedure: ESOPHAGOGASTRODUODENOSCOPY (EGD);  Surgeon: David VEE  Raul SLOAN MD;  Location: [facility] ENDO;  Service: Endoscopy;  Laterality: N/A;    ESOPHAGOGASTRODUODENOSCOPY N/A 12/29/2022    Procedure: ESOPHAGOGASTRODUODENOSCOPY (EGD);  Surgeon: Issa Gayle MD;  Location: [facility] ENDO;  Service: Endoscopy;  Laterality: N/A;    ETHMOIDECTOMY Bilateral 3/27/2019    Procedure: ETHMOIDECTOMY;  Surgeon: Alejandro Parkinson MD;  Location: [facility] OR;  Service: ENT;  Laterality: Bilateral;    EYE SURGERY      FOOT SURGERY      FRONTAL SINUS OBLITERATION Bilateral 3/27/2019    Procedure: SINUSOTOMY, FRONTAL SINUS, OBLITERATIVE;  Surgeon: Alejandro Parkinson MD;  Location: [facility] OR;  Service: ENT;  Laterality: Bilateral;    FUNCTIONAL ENDOSCOPIC SINUS SURGERY (FESS) Bilateral 3/27/2019    Procedure: FESS (FUNCTIONAL ENDOSCOPIC SINUS SURGERY);  Surgeon: Alejandro Parkinson MD;  Location: [facility] OR;  Service: ENT;  Laterality: Bilateral;  Left Keila bullosa resection     KNEE ARTHROSCOPY W/ MENISCAL REPAIR Left 06/2019    dr boykin    KNEE SURGERY Right     LEFT HEART CATHETERIZATION Left 3/2/2020    Procedure: CATHETERIZATION, HEART, LEFT;  Surgeon: Cora Cormier MD;  Location: [facility] CATH LAB;  Service: Cardiology;  Laterality: Left;    LIVER BIOPSY  01/2022    MAXILLARY ANTROSTOMY Bilateral 3/27/2019    Procedure: MAXILLARY ANTROSTOMY;  Surgeon: Alejandro Parkinson MD;  Location: [facility] OR;  Service: ENT;  Laterality: Bilateral;    NASAL SEPTOPLASTY N/A 3/27/2019    Procedure: SEPTOPLASTY, NOSE;  Surgeon: Alejandro Parkinson MD;  Location: [facility] OR;  Service: ENT;  Laterality: N/A;    RECTAL SURGERY  2012    TRANSURETHRAL RESECTION OF PROSTATE (TURP) WITHOUT USE OF LASER N/A 6/19/2018    Procedure: TURP, WITHOUT USING LASER;  Surgeon: Cooper Cordova IV, MD;  Location: [facility] OR;  Service: Urology;  Laterality: N/A;    TRIGGER FINGER RELEASE Left         Home Medications:  Prior to Admission medications    Medication Sig Start Date End Date Taking? Authorizing Provider   amLODIPine (NORVASC) 10 MG tablet TAKE 1 TABLET BY MOUTH ONCE A  DAY (DOSE INCREASE) 3/29/22  Yes Frank Gallardo MD   carvediloL (COREG) 3.125 MG tablet Take 1 tablet (3.125 mg total) by mouth 2 (two) times daily with meals. 9/6/23 9/5/24 Yes Anna Wood MD   empagliflozin (JARDIANCE) 25 mg tablet Take 1 tablet (25 mg total) by mouth once daily. 10/23/23  Yes Bhupinder Callaway MD   famotidine (PEPCID) 40 MG tablet TAKE 1 TABLET BY MOUTH EVERY EVENING 10/16/23  Yes Ania Hills NP   finasteride (PROSCAR) 5 mg tablet TAKE 1 TABLET BY MOUTH once daily 8/8/23  Yes Bhupinder Callaway MD   furosemide (LASIX) 40 MG tablet Take 1 tablet (40 mg total) by mouth 2 (two) times daily. 10/9/23 10/8/24 Yes Frank Gallardo MD   GLUCOSAMINE HCL/CHONDR ROSARIO A NA (OSTEO BI-FLEX ORAL) Take 1 tablet by mouth 2 (two) times daily.    Yes Provider, Historical   krill oil 500 mg Cap Take by mouth.   Yes Provider, Historical   levocetirizine (XYZAL) 5 MG tablet Take 1 tablet (5 mg total) by mouth every evening. 11/30/22  Yes Tracy Schneider MD   levothyroxine (SYNTHROID) 300 MCG Tab Take 1 tablet (300 mcg total) by mouth every morning. 6/15/23  Yes Bhupinder Callaway MD   lisinopriL 10 MG tablet TAKE 1 BY MOUTH EVERY DAY  Patient taking differently: 40 mg. 11/29/23  Yes Frank Gallardo MD   metFORMIN (GLUCOPHAGE-XR) 500 MG ER 24hr tablet Take 1 tablet (500 mg total) by mouth 2 (two) times daily with meals. 10/16/23 10/15/24 Yes Kayleigh Paulino NP   montelukast (SINGULAIR) 10 mg tablet TAKE 1 TABLET BY MOUTH ONCE DAILY 12/13/22  Yes Madeline Davies PA-C   propafenone (RHTHYMOL) 150 MG Tab Take 1 tablet (150 mg total) by mouth every 8 (eight) hours. 8/17/23 8/16/24 Yes Frank Gallardo MD   RABEprazole (ACIPHEX) 20 mg tablet Take 1 tablet (20 mg total) by mouth 2 (two) times daily. 10/10/23  Yes Bhupinder Callaway MD   ACCU-CHEK GRACE PLUS METER Misc AS DIRECTED 2/17/20   Cortes Roblero MD   albuterol (VENTOLIN HFA) 90 mcg/actuation inhaler Inhale 2  "puffs into the lungs every 6 (six) hours as needed for Wheezing or Shortness of Breath. Rescue 11/10/23   Rishi Molina MD   azelastine (ASTELIN) 137 mcg (0.1 %) nasal spray 2 sprays (274 mcg total) by Nasal route 2 (two) times daily. 3/9/22 9/21/23  Alejandro Parkinson MD   blood sugar diagnostic (ACCU-CHEK GRACE PLUS TEST STRP) Strp TEST THREE TIMES A DAY 20   Cortes Roblero MD   budesonide-formoterol 160-4.5 mcg (SYMBICORT) 160-4.5 mcg/actuation HFAA INHALE 2 PUFFS INTO THE LUNGS TWO TIMES A DAY. 11/10/23   Rishi Molina MD   fluticasone propionate (FLONASE) 50 mcg/actuation nasal spray 2 sprays (100 mcg total) by Each Nostril route once daily. 20   Madeline Davies PA-C   insulin (LANTUS SOLOSTAR U-100 INSULIN) glargine 100 units/mL SubQ pen Inject 44 Units into the skin every evening. 23   Bhupinder Callaway MD   ipratropium (ATROVENT) 21 mcg (0.03 %) nasal spray 2 sprays by Each Nostril route 2 (two) times daily. 3/23/23   Flaco Bailon MD   lancets (ACCU-CHEK FASTCLIX LANCET DRUM) Misc TEST TWO TIMES A DAY 18   Cortes Roblero MD   meloxicam (MOBIC) 15 MG tablet Take 15 mg by mouth. 3/29/23   Provider, Historical   pen needle, diabetic (BD ULTRA-FINE MINI PEN NEEDLE) 31 gauge x 3/16" Ndle USE AS DIRECTED 21   GREER Anderson MD   REPATHA SURECLICK 140 mg/mL PnIj SMARTSI Milligram(s) Topical Every 2 Weeks 23   Provider, Historical   rivaroxaban (XARELTO) 20 mg Tab Take 1 tablet (20 mg total) by mouth daily with dinner or evening meal. 23   Frank Gallardo MD   sildenafiL (VIAGRA) 100 MG tablet Take 1/2 to 1 tablet by mouth one hour prior to intercourse. Max 100mg per day 23   Bhupinder Callaway MD        Allergies:  Review of patient's allergies indicates:   Allergen Reactions    Lipitor [atorvastatin] Other (See Comments)     Muscle aches and pains as well as fatigue.     Niacin preparations Other (See Comments)     Causes broken " "blood vessels and bruises at 4 times normal dose.    Statins-hmg-coa reductase inhibitors Other (See Comments)     Statins cause intolerable myalgias.    Iodinated contrast media Hives     Tachycardia    Omeprazole Hives and Itching    Prilosec [omeprazole magnesium] Hives and Itching        Social History:   Social History     Socioeconomic History    Marital status:      Spouse name: YAEL    Number of children: 2   Tobacco Use    Smoking status: Former     Current packs/day: 0.00     Average packs/day: 0.5 packs/day for 4.0 years (2.0 ttl pk-yrs)     Types: Cigarettes     Start date: 1968     Quit date: 1972     Years since quittin.5    Smokeless tobacco: Former     Types: Chew     Quit date: 1976   Substance and Sexual Activity    Alcohol use: Yes     Alcohol/week: 2.0 standard drinks of alcohol     Types: 2 Cans of beer per week    Drug use: No    Sexual activity: Yes     Partners: Female   Social History Narrative    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       Family History:  Family History   Problem Relation Age of Onset    Heart disease Mother     Heart disease Father     Heart disease Brother     Colon cancer Neg Hx        ROS: No acute cardiac events, no acute respiratory complaints.     Physical Exam (all patients):    BP (!) 143/70 (BP Location: Left arm, Patient Position: Lying)   Pulse 74   Temp 98.8 °F (37.1 °C) (Temporal)   Resp 18   Ht 5' 7" (1.702 m)   Wt 102.1 kg (225 lb)   SpO2 97%   BMI 35.24 kg/m²   Lungs: Clear to auscultation bilaterally, respirations unlabored  Heart: Regular rate and rhythm, S1 and S2 normal, no obvious murmurs  Abdomen:         Soft, non-tender, bowel sounds normal, no masses, no organomegaly    Lab Results   Component Value Date    WBC 7.19 2023    MCV 93 2023    RDW 14.0 2023     2023    INR " 1.2 12/05/2023     (H) 11/21/2023    HGBA1C 6.3 (H) 10/09/2023    BUN 17 11/21/2023     11/21/2023    K 4.6 11/21/2023     11/21/2023        SEDATION PLAN: per anesthesia      History reviewed, vital signs satisfactory, cardiopulmonary status satisfactory, sedation options, risks and plans have been discussed with the patient  All their questions were answered and the patient agrees to the sedation procedures as planned and the patient is deemed an appropriate candidate for the sedation as planned.    The risks, benefits and alternatives of the procedure were discussed with the patient in detail. This discussion was had in the presence of  endoscopy staff. The risks include, risks of adverse reaction to sedation requiring the use of reversal agents, bleeding requiring blood transfusion, perforation requiring surgical intervention and technical failure. Other risks include aspiration leading to respiratory distress and respiratory failure resulting in endotracheal intubation and mechanical ventilation including death. If anesthesia is being utilized for this procedure, it is up to the anesthesiologist to determine airway safety including elective endotracheal intubation. Questions were answered, they agree to proceed. There was no language barriers.      Procedure explained to patient, informed consent obtained and placed in chart.    Ama Barrera  12/14/2023  7:57 AM

## 2023-12-14 NOTE — ANESTHESIA PREPROCEDURE EVALUATION
12/14/2023  Erendira Pretty is a 78 y.o., male.      Pre-op Assessment    I have reviewed the Patient Summary Reports.     I have reviewed the Nursing Notes. I have reviewed the NPO Status.   I have reviewed the Medications.     Review of Systems  Anesthesia Hx:  No problems with previous Anesthesia             Denies Family Hx of Anesthesia complications.    Denies Personal Hx of Anesthesia complications.                    Social:  Former Smoker, Alcohol Use       Hematology/Oncology:       -- Anemia:                              Oncology Comments: HCC (hepatocellular carcinoma     EENT/Dental:   Glaucoma  Hearing loss          Cardiovascular:     Hypertension Valvular problems/Murmurs, AS  Denies MI. CAD   CABG/stent Dysrhythmias atrial fibrillation Angina  Denies CHF.   PVD hyperlipidemia   ECG has been reviewed. ekg 3/2020:  Sinus rhythm with 1st degree A-V block with Premature atrial complexes 74  Minimal voltage criteria for LVH, may be normal variant   Cannot rule out Anterior infarct ,age undetermined   Abnormal ECG   When compared with ECG of 02-MAR-2020 05:12,   No significant change was found    Echo 12/2018:  1 - Concentric hypertrophy.     2 - No wall motion abnormalities.     3 - Normal left ventricular systolic function (EF 60-65%).     4 - Indeterminate LV diastolic function.     5 - Normal right ventricular systolic function .     6 - The estimated PA systolic pressure is greater than 33 mmHg.     7 - Mild tricuspid regurgitation.   Poor imaging quality    S/P CABG x 2  Normal myocardial perfusion scan. There is no evidence of myocardial ischemia or infarction.    The gated perfusion images showed an ejection fraction of 69% at rest. The gated perfusion images showed an ejection fraction of 61% post stress. Normal ejection fraction is greater than 54%.    There is normal wall motion at rest  and post stress.    LV cavity size is normal at rest and normal at stress.    The EKG portion of this study is abnormal but not diagnostic.    The patient reported no chest pain during the stress test.    Arrhythmias during stress: PACs.                           Pulmonary:   COPD Asthma    Sleep Apnea Asbestos exposure          Education provided regarding risk of obstructive sleep apnea            Renal/:  Chronic Renal Disease, CRI  BPH              Hepatic/GI:     GERD Liver Disease, Hepatitis, C Cirrhosis  Secondary esophageal varices without bleeding          Musculoskeletal:  Arthritis               Neurological:    Denies CVA.    Denies Seizures.                                Endocrine:  Diabetes, type 2 Hypothyroidism              Physical Exam  General: Obesity    Airway:  Mallampati: II   Mouth Opening: Normal  Tongue: Normal    Dental:  Upper Dentures        Anesthesia Plan  Type of Anesthesia, risks & benefits discussed:    Anesthesia Type: MAC  Intra-op Monitoring Plan: Standard ASA Monitors  Induction:  IV  Informed Consent: Informed consent signed with the Patient and all parties understand the risks and agree with anesthesia plan.  All questions answered.   ASA Score: 3  Day of Surgery Review of History & Physical: I have interviewed and examined the patient. I have reviewed the patient's H&P dated:     Ready For Surgery From Anesthesia Perspective.     .

## 2023-12-14 NOTE — PROVATION PATIENT INSTRUCTIONS
Discharge Summary/Instructions after an Endoscopic Procedure  Patient Name: Erendira Pretty  Patient MRN: [id number]  Patient YOB: 1945 Thursday, December 14, 2023 Ama Barrera MD  Dear patient,  As a result of recent federal legislation (The Federal Cures Act), you may   receive lab or pathology results from your procedure in your [facility]   account before your physician is able to contact you. Your physician or   their representative will relay the results to you with their   recommendations at their soonest availability.  Thank you,  RESTRICTIONS:  During your procedure today, you received medications for sedation.  These   medications may affect your judgment, balance and coordination.  Therefore,   for 24 hours, you have the following restrictions:   - DO NOT drive a car, operate machinery, make legal/financial decisions,   sign important papers or drink alcohol.    ACTIVITY:  Today: no heavy lifting, straining or running due to procedural   sedation/anesthesia.  The following day: return to full activity including work.  DIET:  Eat and drink normally unless instructed otherwise.     TREATMENT FOR COMMON SIDE EFFECTS:  - Mild abdominal pain, nausea, belching, bloating or excessive gas:  rest,   eat lightly and use a heating pad.  - Sore Throat: treat with throat lozenges and/or gargle with warm salt   water.  - Because air was used during the procedure, expelling large amounts of air   from your rectum or belching is normal.  - If a bowel prep was taken, you may not have a bowel movement for 1-3 days.    This is normal.  SYMPTOMS TO WATCH FOR AND REPORT TO YOUR PHYSICIAN:  1. Abdominal pain or bloating, other than gas cramps.  2. Chest pain.  3. Back pain.  4. Signs of infection such as: chills or fever occurring within 24 hours   after the procedure.  5. Rectal bleeding, which would show as bright red, maroon, or black stools.   (A tablespoon of blood from the rectum is not serious, especially if    hemorrhoids are present.)  6. Vomiting.  7. Weakness or dizziness.  GO DIRECTLY TO THE NEAREST EMERGENCY ROOM IF YOU HAVE ANY OF THE FOLLOWING:      Difficulty breathing              Chills and/or fever over 101 F   Persistent vomiting and/or vomiting blood   Severe abdominal pain   Severe chest pain   Black, tarry stools   Bleeding- more than one tablespoon   Any other symptom or condition that you feel may need urgent attention  Your doctor recommends these additional instructions:  If any biopsies were taken, your doctors clinic will contact you in 1 to 2   weeks with any results.  - Discharge patient to home (with escort).   - Resume previous diet.   - Continue present medications.   - Resume Xarelto (rivaroxaban) at prior dose in 3 days.  Refer to referring   physician for further adjustment of therapy.   - Perform a CT scan (computed tomography) of chest with contrast and abdomen   with contrast. This will be scheduled for you according to your   availability. Please call the Radiology department at [phone].   - Await pathology results.   - Repeat colonoscopy in 5 years for surveillance based on clinical status at   that time.   - Return to GI clinic with Ms. Jess MARIN.  For questions, problems or results please call your physician Ama Barrera MD at Work:  [phone]  If you have any questions about the above instructions, call the GI   department at [phone] or call the endoscopy unit at [phone]   from 7am until 3 pm.  [facility], EMERGENCY ROOM PHONE NUMBER:   [phone]  IF A COMPLICATION OR EMERGENCY SITUATION ARISES AND YOU ARE UNABLE TO REACH   YOUR PHYSICIAN - GO DIRECTLY TO THE EMERGENCY ROOM.  I have read or have had read to me these discharge instructions for my   procedure and have received a written copy.  I understand these   instructions and will follow-up with my physician if I have any questions.     __________________________________        _____________________________________  Nurse Signature                                          Patient/Designated   Responsible Party Signature  MD Ama Linares MD  12/14/2023 8:37:12 AM  This report has been verified and signed electronically.  Dear patient,  As a result of recent federal legislation (The Federal Cures Act), you may   receive lab or pathology results from your procedure in your [facility]   account before your physician is able to contact you. Your physician or   their representative will relay the results to you with their   recommendations at their soonest availability.  Thank you,  PROVATION

## 2023-12-15 VITALS
RESPIRATION RATE: 18 BRPM | SYSTOLIC BLOOD PRESSURE: 131 MMHG | WEIGHT: 225 LBS | HEIGHT: 67 IN | BODY MASS INDEX: 35.31 KG/M2 | HEART RATE: 72 BPM | DIASTOLIC BLOOD PRESSURE: 79 MMHG | TEMPERATURE: 98 F | OXYGEN SATURATION: 99 %

## 2023-12-18 DIAGNOSIS — J32.4 CHRONIC PANSINUSITIS: ICD-10-CM

## 2023-12-18 LAB
FINAL PATHOLOGIC DIAGNOSIS: NORMAL
GROSS: NORMAL
Lab: NORMAL

## 2023-12-18 RX ORDER — MONTELUKAST SODIUM 10 MG/1
10 TABLET ORAL DAILY
Qty: 90 TABLET | Refills: 3 | Status: SHIPPED | OUTPATIENT
Start: 2023-12-18 | End: 2024-02-16 | Stop reason: SDUPTHER

## 2023-12-18 NOTE — TELEPHONE ENCOUNTER
----- Message from Rachel Pena sent at 12/18/2023 10:56 AM CST -----  Contact: Erendira  Type:  RX Refill Request    Who Called: Erendira  Refill or New Rx:refill  RX Name and Strength:montelukast (SINGULAIR) 10 mg tablet  How is the patient currently taking it? (ex. 1XDay):1xday  Is this a 30 day or 90 day RX:90day if possible  Preferred Pharmacy with phone number:  [place], [place] - [address] [facility] [address]  [address] [address] [address]  Phone: [phone] Fax: [phone]  Local or Mail Order:local  Ordering Provider:Madeline BURGESS  Would the patient rather a call back or a response via [facility]? Call back  Best Call Back Number:[phone]  Additional Information:   Thanks  Am

## 2023-12-20 NOTE — PROGRESS NOTES
"The biopsies of the ulcers are consistent with a condition called. "Ischemia". This is when there is lack of blood supply to a certain part of your colon. A special CT scan of the abdomen/pelvis has been ordered to look at your blood vessels that provide blood to your colon. If this is negative you may need a echocardiogram (ultrasound of your heart) to look for other causes of the ischemic ulcers or a holter monitor looking for funny heart rhythms of your heart. Please follow up with Ms. Jess MARIN in the liver clinic."

## 2023-12-20 NOTE — PROGRESS NOTES
The polyps removed were precancerous also known as adenomas. They were completely removed. A repeat colonoscopy is NOT recommended due to your age.      Thanks for trusting us with your healthcare needs and using [facility].     Sincerely,    Ama Barrera M.D.

## 2023-12-22 ENCOUNTER — HOSPITAL ENCOUNTER (OUTPATIENT)
Dept: RADIOLOGY | Facility: HOSPITAL | Age: 78
Discharge: HOME | End: 2023-12-22
Attending: INTERNAL MEDICINE
Payer: MEDICARE

## 2023-12-22 ENCOUNTER — TELEPHONE (OUTPATIENT)
Dept: VASCULAR SURGERY | Facility: CLINIC | Age: 78
End: 2023-12-22
Payer: MEDICARE

## 2023-12-22 DIAGNOSIS — R10.30 LOWER ABDOMINAL PAIN: ICD-10-CM

## 2023-12-22 DIAGNOSIS — K55.1 MESENTERIC ARTERY STENOSIS: Primary | ICD-10-CM

## 2023-12-22 DIAGNOSIS — K55.1 CHRONIC MESENTERIC ISCHEMIA: Primary | ICD-10-CM

## 2023-12-22 PROCEDURE — 99499 NO LOS: ICD-10-PCS | Mod: ,,, | Performed by: INTERNAL MEDICINE

## 2023-12-22 PROCEDURE — 74174 CTA ABD&PLVS W/CONTRAST: CPT | Mod: 26,,, | Performed by: RADIOLOGY

## 2023-12-22 PROCEDURE — 99499 UNLISTED E&M SERVICE: CPT | Mod: ,,, | Performed by: INTERNAL MEDICINE

## 2023-12-22 PROCEDURE — 74174 CTA ABDOMEN AND PELVIS: ICD-10-PCS | Mod: 26,,, | Performed by: RADIOLOGY

## 2023-12-22 PROCEDURE — 74174 CTA ABD&PLVS W/CONTRAST: CPT | Mod: TC

## 2023-12-22 PROCEDURE — 25500020 PHARM REV CODE 255: Performed by: INTERNAL MEDICINE

## 2023-12-22 RX ADMIN — IOHEXOL 100 ML: 350 INJECTION, SOLUTION INTRAVENOUS at 08:12

## 2023-12-22 NOTE — TELEPHONE ENCOUNTER
Contacted pt to schedule appt with Dr. Alfred for mesenteric artery stenosis. Appointment scheduled, pt verified. Instructed pt to fast 8 hours prior to ultrasound. Pt repeated instructions and verbalized understanding.

## 2023-12-22 NOTE — PROGRESS NOTES
The major blood vessels that feed your colon and other parts of your bowel shows that they are filled with plaque and nearly blocked. I recommend you seeing a vascular surgeon as soon as possible. Our colleagues at [facility] may be able to see you next week. A referral will be placed today and I am asking my staff to reach out to the vascular surgeon's office as well. If your pain worsens, please seek medical attention at the nearest hospital. I also recommend you start taking a baby aspirin daily.     Addendum: 12/22/23 at 3:18pm  Communicated with vascular surgeon fellow on call, Dr. Steve Carr. He has been gracious enough to agree to see this patient in urgent consultation next week Tuesday 12/26/23 at 7:30am. His office will contact the patient. I also reached out to the patient and left a HIPAA complaint message requesting he call back. I am on call this weekend and available to speak as well.

## 2023-12-26 ENCOUNTER — HOSPITAL ENCOUNTER (OUTPATIENT)
Dept: VASCULAR SURGERY | Facility: CLINIC | Age: 78
Discharge: HOME | End: 2023-12-26
Attending: SURGERY
Payer: MEDICARE

## 2023-12-26 ENCOUNTER — INITIAL CONSULT (OUTPATIENT)
Dept: VASCULAR SURGERY | Facility: CLINIC | Age: 78
End: 2023-12-26
Attending: SURGERY
Payer: MEDICARE

## 2023-12-26 VITALS
DIASTOLIC BLOOD PRESSURE: 73 MMHG | WEIGHT: 222.69 LBS | SYSTOLIC BLOOD PRESSURE: 141 MMHG | HEART RATE: 75 BPM | HEIGHT: 67 IN | TEMPERATURE: 99 F | BODY MASS INDEX: 34.95 KG/M2

## 2023-12-26 DIAGNOSIS — K55.1 CHRONIC MESENTERIC ISCHEMIA: ICD-10-CM

## 2023-12-26 DIAGNOSIS — K55.1 MESENTERIC ARTERY STENOSIS: Primary | ICD-10-CM

## 2023-12-26 DIAGNOSIS — K55.1 MESENTERIC ARTERY STENOSIS: ICD-10-CM

## 2023-12-26 DIAGNOSIS — I73.9 PVD (PERIPHERAL VASCULAR DISEASE): Primary | ICD-10-CM

## 2023-12-26 PROCEDURE — 99205 PR OFFICE/OUTPT VISIT, NEW, LEVL V, 60-74 MIN: ICD-10-PCS | Mod: S$GLB,,, | Performed by: SURGERY

## 2023-12-26 PROCEDURE — 93975 PR DUPLEX ABD/PEL VASC STUDY,COMPLETE: ICD-10-PCS | Mod: S$GLB,,, | Performed by: SURGERY

## 2023-12-26 PROCEDURE — 99999 PR PBB SHADOW E&M-EST. PATIENT-LVL V: CPT | Mod: PBBFAC,,, | Performed by: SURGERY

## 2023-12-26 PROCEDURE — 99205 OFFICE O/P NEW HI 60 MIN: CPT | Mod: S$GLB,,, | Performed by: SURGERY

## 2023-12-26 PROCEDURE — 99999 PR PBB SHADOW E&M-EST. PATIENT-LVL V: ICD-10-PCS | Mod: PBBFAC,,, | Performed by: SURGERY

## 2023-12-26 PROCEDURE — 93975 VASCULAR STUDY: CPT | Mod: S$GLB,,, | Performed by: SURGERY

## 2023-12-26 RX ORDER — ASPIRIN 81 MG/1
81 TABLET ORAL EVERY MORNING
Status: ON HOLD | COMMUNITY
End: 2024-01-26 | Stop reason: HOSPADM

## 2023-12-26 RX ORDER — SODIUM CHLORIDE 9 MG/ML
INJECTION, SOLUTION INTRAVENOUS CONTINUOUS
Status: CANCELLED | OUTPATIENT
Start: 2023-12-26

## 2023-12-26 NOTE — PROGRESS NOTES
VASCULAR SURGERY SERVICE    REFERRING DOCTOR: Ama Barrera MDR Provider     CHIEF COMPLAINT:  Chronic mesenteric ischemia    HISTORY OF PRESENT ILLNESS: Erendira Pretty is a 78 y.o. male near [place], [place], with a history of paroxysmal AFib on Xarelto, with history of hepatocellular carcinoma status post ablation, who has a 1-2 month history fairly severe upper abdominal/epigastric pain which he states typically happens 5-6 hours after his dinner meal typically is associated with significant diarrhea.  This is not happen every evening but 2-3 times per week.  He denies any postprandial pain immediately after eating common and denies similar events eating breakfast or lunch.  He says he has a good appetite generally.  However he does admit to a 10 lb weight loss over the last 4-6 weeks.    Recent colonoscopy demonstrated scattered ulcerations in the cecum and ascending colon thought to be consistent with ischemic etiology.  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  He was also begun aspirin 81 mg daily in addition to his Xarelto.    He is status post CABG in 2018.  Denies any chest pain shortness of breath    Bilateral shoulder issues which he brought up when I discussed with him that during his procedure we may want him to have his arms above his head    Past Medical History:   Diagnosis Date    Aortic stenosis     Asthma     Benign prostatic hyperplasia with nocturia     Bilateral carotid artery stenosis 7/21/2021     CAROTID BILATERAL 07/14/2021 IMPRESSION: Atherosclerosis with suspected stenosis greater than 50% at the right proximal ICA visually. Velocities are discordant and appear improved from prior. Atherosclerosis on the left with no evidence of flow-limiting stenosis greater than 50%.  FINDINGS: Right: Internal Carotid  Artery (ICA): Peak systolic velocity 118 cm/sec. End diastolic velocity 35 cm/sec    BPH (benign prostatic hyperplasia)     CAD (coronary artery disease)     40% lesion 2002;violet    Cirrhosis     Claudication 4/9/2014    Colon polyp     COPD (chronic obstructive pulmonary disease)     ED (erectile dysfunction)     Encounter for blood transfusion     Ex-smoker     Hearing loss NEC     Hepatitis C     Cured;reji brown 2015    Hyperlipidemia     Hypertension     Hypothyroid     s/p tx graves    Open angle with borderline findings and low glaucoma risk in both eyes 9/22/2020    DELONTE on CPAP     Osteoarthritis     PVD (peripheral vascular disease)     Secondary esophageal varices without bleeding 6/26/2017    Type 2 diabetes mellitus        Past Surgical History:   Procedure Laterality Date    CARDIAC CATHETERIZATION      CARDIAC SURGERY      CARPAL TUNNEL RELEASE Left     CATARACT EXTRACTION      CATARACT EXTRACTION W/ INTRAOCULAR LENS  IMPLANT, BILATERAL  2008    CATHETERIZATION OF BOTH LEFT AND RIGHT HEART N/A 11/2/2018    Procedure: CATHETERIZATION, HEART, BOTH LEFT AND RIGHT;  Surgeon: Frank Gallardo MD;  Location: [facility] CATH LAB;  Service: Cardiology;  Laterality: N/A;    COLONOSCOPY  8/2013    COLONOSCOPY N/A 5/30/2016    Procedure: COLONOSCOPY;  Surgeon: Anna Tomas MD;  Location: [facility] ENDO;  Service: Endoscopy;  Laterality: N/A;    COLONOSCOPY N/A 7/17/2019    Procedure: COLONOSCOPY;  Surgeon: David Carter III, MD;  Location: [facility] ENDO;  Service: Endoscopy;  Laterality: N/A;    COLONOSCOPY N/A 12/14/2023    Procedure: COLONOSCOPY;  Surgeon: Ama Barrera MD;  Location: [facility] ENDO;  Service: Endoscopy;  Laterality: N/A;    COMPUTED TOMOGRAPHY N/A 9/9/2019    Procedure: CT (COMPUTED TOMOGRAPHY);  Surgeon: Sri Diagnostic Provider;  Location: [facility] OR;  Service: General;  Laterality: N/A;  Microwave ablation to be done in CT.  Need CRNA and cart    COMPUTED TOMOGRAPHY N/A 1/25/2022     Procedure: Liver Microwave Ablation;  Surgeon: Red Puentes MD;  Location: [facility];  Service: General;  Laterality: N/A;    CORONARY ARTERY BYPASS GRAFT (CABG) N/A 11/5/2018    Procedure: CORONARY ARTERY BYPASS GRAFT (CABG);  Surgeon: Bear De Luna MD;  Location: [facility] OR;  Service: Cardiothoracic;  Laterality: N/A;  TWO VESSEL BYPASS WITH AORTOTOMY AND EXCISION OF ATHEROSCLEROTIC PLAQUE    CORONARY BYPASS GRAFT ANGIOGRAPHY  3/2/2020    Procedure: Bypass graft study;  Surgeon: Cora Cormier MD;  Location: [facility] CATH LAB;  Service: Cardiology;;    ELBOW BURSA SURGERY Right 2010    ENDOSCOPIC HARVEST OF VEIN Left 11/5/2018    Procedure: SURGICAL PROCUREMENT, VEIN, ENDOSCOPIC;  Surgeon: Bear De Luna MD;  Location: [facility] OR;  Service: Cardiothoracic;  Laterality: Left;    ESOPHAGOGASTRODUODENOSCOPY N/A 7/17/2019    Procedure: ESOPHAGOGASTRODUODENOSCOPY (EGD);  Surgeon: David Carter III, MD;  Location: [facility] ENDO;  Service: Endoscopy;  Laterality: N/A;    ESOPHAGOGASTRODUODENOSCOPY N/A 12/29/2022    Procedure: ESOPHAGOGASTRODUODENOSCOPY (EGD);  Surgeon: Issa Gayle MD;  Location: [facility];  Service: Endoscopy;  Laterality: N/A;    ETHMOIDECTOMY Bilateral 3/27/2019    Procedure: ETHMOIDECTOMY;  Surgeon: Alejandro Parkinson MD;  Location: [facility] OR;  Service: ENT;  Laterality: Bilateral;    EYE SURGERY      FOOT SURGERY      FRONTAL SINUS OBLITERATION Bilateral 3/27/2019    Procedure: SINUSOTOMY, FRONTAL SINUS, OBLITERATIVE;  Surgeon: Alejandro Parkinson MD;  [facility];  Service: ENT;  Laterality: Bilateral;    FUNCTIONAL ENDOSCOPIC SINUS SURGERY (FESS) Bilateral 3/27/2019    Procedure: FESS (FUNCTIONAL ENDOSCOPIC SINUS SURGERY);  Surgeon: Alejandro Parkinson MD;  Location: [facility];  Service: ENT;  Laterality: Bilateral;  Left Keila bullosa resection     KNEE ARTHROSCOPY W/ MENISCAL REPAIR Left 06/2019    dr boykin    KNEE SURGERY Right     LEFT HEART CATHETERIZATION Left 3/2/2020    Procedure: CATHETERIZATION,  HEART, LEFT;  Surgeon: Cora Cormier MD;  Location: [facility] C[facility] LAB;  Service: Cardiology;  Laterality: Left;    LIVER BIOPSY  01/2022    MAXILLARY ANTROSTOMY Bilateral 3/27/2019    Procedure: MAXILLARY ANTROSTOMY;  Surgeon: Alejandro Parkinson MD;  Location: [facility];  Service: ENT;  Laterality: Bilateral;    NASAL SEPTOPLASTY N/A 3/27/2019    Procedure: SEPTOPLASTY, NOSE;  Surgeon: Alejandro Parkinson MD;  Location: [facility];  Service: ENT;  Laterality: N/A;    RECTAL SURGERY  2012    TRANSURETHRAL RESECTION OF PROSTATE (TURP) WITHOUT USE OF LASER N/A 6/19/2018    Procedure: TURP, WITHOUT USING LASER;  Surgeon: Cooper Cordova IV, MD;  Location: [facility] OR;  Service: Urology;  Laterality: N/A;    TRIGGER FINGER RELEASE Left          Current Outpatient Medications:     ACCU-CHEK GRACE PLUS METER Misc, AS DIRECTED, Disp: 1 each, Rfl: 0    albuterol (VENTOLIN HFA) 90 mcg/actuation inhaler, Inhale 2 puffs into the lungs every 6 (six) hours as needed for Wheezing or Shortness of Breath. Rescue, Disp: 18 g, Rfl: 3    amLODIPine (NORVASC) 10 MG tablet, TAKE 1 TABLET BY MOUTH ONCE A DAY (DOSE INCREASE), Disp: 30 tablet, Rfl: 11    aspirin (ECOTRIN) 81 MG EC tablet, Take 81 mg by mouth once daily., Disp: , Rfl:     blood sugar diagnostic (ACCU-CHEK GRACE PLUS TEST STRP) Strp, TEST THREE TIMES A DAY, Disp: 100 strip, Rfl: 11    budesonide-formoterol 160-4.5 mcg (SYMBICORT) 160-4.5 mcg/actuation HFAA, INHALE 2 PUFFS INTO THE LUNGS TWO TIMES A DAY., Disp: 10.2 g, Rfl: 11    carvediloL (COREG) 3.125 MG tablet, Take 1 tablet (3.125 mg total) by mouth 2 (two) times daily with meals., Disp: 60 tablet, Rfl: 5    diphenhydrAMINE (BENADRYL) 50 MG capsule, Take 50mg by mouth 1 hour before contrast administration., Disp: 1 capsule, Rfl: 0    empagliflozin (JARDIANCE) 25 mg tablet, Take 1 tablet (25 mg total) by mouth once daily., Disp: 90 tablet, Rfl: 1    famotidine (PEPCID) 40 MG tablet, TAKE 1 TABLET BY MOUTH EVERY EVENING, Disp: 30 tablet, Rfl: 11     "finasteride (PROSCAR) 5 mg tablet, TAKE 1 TABLET BY MOUTH once daily, Disp: 90 tablet, Rfl: 2    fluticasone propionate (FLONASE) 50 mcg/actuation nasal spray, 2 sprays (100 mcg total) by Each Nostril route once daily., Disp: 18.2 mL, Rfl: 6    furosemide (LASIX) 40 MG tablet, Take 1 tablet (40 mg total) by mouth 2 (two) times daily., Disp: 60 tablet, Rfl: 11    GLUCOSAMINE HCL/CHONDR ROSARIO A NA (OSTEO BI-FLEX ORAL), Take 1 tablet by mouth 2 (two) times daily. , Disp: , Rfl:     insulin (LANTUS SOLOSTAR U-100 INSULIN) glargine 100 units/mL SubQ pen, Inject 44 Units into the skin every evening., Disp: 45 mL, Rfl: 1    ipratropium (ATROVENT) 21 mcg (0.03 %) nasal spray, 2 sprays by Each Nostril route 2 (two) times daily., Disp: 30 mL, Rfl: 0    krill oil 500 mg Cap, Take by mouth., Disp: , Rfl:     lancets (ACCU-CHEK FASTCLIX LANCET DRUM) Misc, TEST TWO TIMES A DAY, Disp: 102 each, Rfl: 5    levocetirizine (XYZAL) 5 MG tablet, Take 1 tablet (5 mg total) by mouth every evening., Disp: 30 tablet, Rfl: 11    levothyroxine (SYNTHROID) 300 MCG Tab, Take 1 tablet (300 mcg total) by mouth every morning., Disp: 90 tablet, Rfl: 3    lisinopriL 10 MG tablet, TAKE 1 BY MOUTH EVERY DAY (Patient taking differently: 40 mg.), Disp: 30 tablet, Rfl: 7    meloxicam (MOBIC) 15 MG tablet, Take 15 mg by mouth., Disp: , Rfl:     metFORMIN (GLUCOPHAGE-XR) 500 MG ER 24hr tablet, Take 1 tablet (500 mg total) by mouth 2 (two) times daily with meals., Disp: 180 tablet, Rfl: 3    montelukast (SINGULAIR) 10 mg tablet, Take 1 tablet (10 mg total) by mouth once daily., Disp: 90 tablet, Rfl: 3    pen needle, diabetic (BD ULTRA-FINE MINI PEN NEEDLE) 31 gauge x 3/16" Ndle, USE AS DIRECTED, Disp: 100 each, Rfl: 11    predniSONE (DELTASONE) 50 MG Tab, Take 50mg by mouth at 13 hours, 7 hours, and 1 hour before contrast administration., Disp: 3 tablet, Rfl: 0    propafenone (RHTHYMOL) 150 MG Tab, Take 1 tablet (150 mg total) by mouth every 8 (eight) hours., " Disp: 90 tablet, Rfl: 11    RABEprazole (ACIPHEX) 20 mg tablet, Take 1 tablet (20 mg total) by mouth 2 (two) times daily., Disp: 180 tablet, Rfl: 3    REPATHA SURECLICK 140 mg/mL PnIj, SMARTSI Milligram(s) Topical Every 2 Weeks, Disp: , Rfl:     rivaroxaban (XARELTO) 20 mg Tab, Take 1 tablet (20 mg total) by mouth daily with dinner or evening meal., Disp: 30 tablet, Rfl: 11    sildenafiL (VIAGRA) 100 MG tablet, Take 1/2 to 1 tablet by mouth one hour prior to intercourse. Max 100mg per day, Disp: 30 tablet, Rfl: 11    azelastine (ASTELIN) 137 mcg (0.1 %) nasal spray, 2 sprays (274 mcg total) by Nasal route 2 (two) times daily., Disp: 30 mL, Rfl: 6  No current facility-administered medications for this visit.    Facility-Administered Medications Ordered in Other Visits:     lactated ringers infusion, , Intravenous, Continuous, Omaira Theodore MD, New Bag at 19 1117    Review of patient's allergies indicates:   Allergen Reactions    Lipitor [atorvastatin] Other (See Comments)     Muscle aches and pains as well as fatigue.     Niacin preparations Other (See Comments)     Causes broken blood vessels and bruises at 4 times normal dose.    Statins-hmg-coa reductase inhibitors Other (See Comments)     Statins cause intolerable myalgias.    Iodinated contrast media Hives     Tachycardia    Omeprazole Hives and Itching    Prilosec [omeprazole magnesium] Hives and Itching       Family History   Problem Relation Age of Onset    Heart disease Mother     Heart disease Father     Heart disease Brother     Colon cancer Neg Hx        Social History     Tobacco Use    Smoking status: Former     Current packs/day: 0.00     Average packs/day: 0.5 packs/day for 4.0 years (2.0 ttl pk-yrs)     Types: Cigarettes     Start date: 1968     Quit date: 1972     Years since quittin.5    Smokeless tobacco: Former     Types: Chew     Quit date: 1976   Substance Use Topics    Alcohol use: Yes      "Alcohol/week: 2.0 standard drinks of alcohol     Types: 2 Cans of beer per week    Drug use: No       REVIEW OF SYSTEMS:  General: No chills, fever, malaise, changes in weight  HEENT: No visual changes, difficulty hearing  Cardiovascular: No chest pain, palpitations, claudication  Pulmonary: No dyspnea, cough, wheezing  Gastrointestinal: No nausea, vomiting, diarrhea, constipation  Genitourinary: No dysuria, low urine output, hematuria  Endocrine: No polydipsia, polyphagia  Hematologic: No fatigue with exertion, pica, pallor  Musculoskeletal: No extremity or joint pain, no back pain, no difficulty with ambulation  Neurologic: no seizures, no headaches, no weakness  Psychiatric: no mood disturbance      PHYSICAL EXAM:   BP (!) 141/73 (BP Location: Right arm, Patient Position: Sitting, BP Method: Large (Automatic))   Pulse 75   Temp 98.5 °F (36.9 °C) (Oral)   Ht 5' 7" (1.702 m)   Wt 101 kg (222 lb 10.6 oz)   BMI 34.87 kg/m²   Constitutional:  Alert,   Well-appearing  In no distress.  BMI 34.8   Neurological: Normal speech  no focal findings  CN II - XII grossly intact.    Psychiatric: Mood and affect appropriate and symmetric.   HEENT: Normocephalic / atraumatic  PERRLA  Midline trachea  No scars across the neck   Cardiac: Regular rate and rhythm.   Pulmonary: Normal pulmonary effort.   Abdomen: Soft, not distended.     Skin: Warm and well perfused.    Vascular:  Femoral pulses 1 to 2+ bilaterally   Extremities/  Musculoskeletal: No edema.        IMAGING:  Duplex ultra sound today suggests very high-grade celiac artery stenosis with a peak systolic velocity of 324.  Proximal SMA stenosis of  277 is noted    Recent CTA was personally reviewed.  This demonstrates a very very calcific proximal SMA with either a very very high-grade stenosis versus short occlusion.  Small amount of non flow-limiting laminated thrombus appears to be present just distal to this area.    Celiac Artery appears to have a high-grade ostial " stenosis.  This vessel was quite large.    The YASIR is patent, does not have a large meandering vessel    Right common femoral artery has significant , left common femoral artery less so    IMPRESSION:  While his clinical presentation of abdominal pain 5-6 hours after a large meal is distinctly atypical for chronic mesenteric ischemia, the findings of the ischemic ulceration in his colon, and certainly his anatomy is very concerning for a compromised blood flow to his intra-abdominal viscera.  Intervention is warranted    PLAN:   Left common femoral approach, possible SMA and were celiac artery stent placement.  01/04/2024.     Will require a steerable sheath  Consider GETA to allow placement of his arms above his head for better visualization of his mesenteric vessels from a lateral projection.  Hold Xarelto for 2 days prior, continue aspirin    I have explained the risks, benefits and alternatives for this procedure in detail.  The patient voices understanding and all questions have be answered, and agrees to proceed with the procedure.     MARIUSZ Alfred III, MD, FACS  Professor and Chief, Vascular and Endovascular Surgery

## 2023-12-26 NOTE — LETTER
[facility] - Vascular Surg 5th Fl  [address]  [facility] [address]  Phone: [phone]  Fax: [phone] January 11, 2024        Ama Barrera MD  [id number] [place] [id number]    Patient: Erendira Pretty   MR Number: [id number]   YOB: 1945   Date of Visit: 12/26/2023     Dear Dr. Barrera:    Thank you for referring Erendira Pretty to me for evaluation. Attached are the relevant portions of my assessment and plan of care.    If you have questions, please do not hesitate to call me. I look forward to following Erendira along with you.    Sincerely,     ALEX Alfred III, MD   Professor and Chief  Vascular and Endovascular Surgery  Vice-Chair, Department of Surgery  [facility]    CC  Bhupinder Callaway MD

## 2023-12-26 NOTE — H&P (VIEW-ONLY)
VASCULAR SURGERY SERVICE    REFERRING DOCTOR: Ama Barrera MDR Provider     CHIEF COMPLAINT:  Chronic mesenteric ischemia    HISTORY OF PRESENT ILLNESS: Erendira Pretty is a 78 y.o. male near [place], [place], with a history of paroxysmal AFib on Xarelto, with history of hepatocellular carcinoma status post ablation, who has a 1-2 month history fairly severe upper abdominal/epigastric pain which he states typically happens 5-6 hours after his dinner meal typically is associated with significant diarrhea.  This is not happen every evening but 2-3 times per week.  He denies any postprandial pain immediately after eating common and denies similar events eating breakfast or lunch.  He says he has a good appetite generally.  However he does admit to a 10 lb weight loss over the last 4-6 weeks.    Recent colonoscopy demonstrated scattered ulcerations in the cecum and ascending colon thought to be consistent with ischemic etiology.  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  He was also begun aspirin 81 mg daily in addition to his Xarelto.    He is status post CABG in 2018.  Denies any chest pain shortness of breath    Bilateral shoulder issues which he brought up when I discussed with him that during his procedure we may want him to have his arms above his head    Past Medical History:   Diagnosis Date    Aortic stenosis     Asthma     Benign prostatic hyperplasia with nocturia     Bilateral carotid artery stenosis 7/21/2021     CAROTID BILATERAL 07/14/2021 IMPRESSION: Atherosclerosis with suspected stenosis greater than 50% at the right proximal ICA visually. Velocities are discordant and appear improved from prior. Atherosclerosis on the left with no evidence of flow-limiting stenosis greater than 50%.  FINDINGS: Right: Internal Carotid  Artery (ICA): Peak systolic velocity 118 cm/sec. End diastolic velocity 35 cm/sec    BPH (benign prostatic hyperplasia)     CAD (coronary artery disease)     40% lesion 2002;violet    Cirrhosis     Claudication 4/9/2014    Colon polyp     COPD (chronic obstructive pulmonary disease)     ED (erectile dysfunction)     Encounter for blood transfusion     Ex-smoker     Hearing loss NEC     Hepatitis C     Cured;reji brown 2015    Hyperlipidemia     Hypertension     Hypothyroid     s/p tx graves    Open angle with borderline findings and low glaucoma risk in both eyes 9/22/2020    DELONTE on CPAP     Osteoarthritis     PVD (peripheral vascular disease)     Secondary esophageal varices without bleeding 6/26/2017    Type 2 diabetes mellitus        Past Surgical History:   Procedure Laterality Date    CARDIAC CATHETERIZATION      CARDIAC SURGERY      CARPAL TUNNEL RELEASE Left     CATARACT EXTRACTION      CATARACT EXTRACTION W/ INTRAOCULAR LENS  IMPLANT, BILATERAL  2008    CATHETERIZATION OF BOTH LEFT AND RIGHT HEART N/A 11/2/2018    Procedure: CATHETERIZATION, HEART, BOTH LEFT AND RIGHT;  Surgeon: Frank Gallardo MD;  Location: [facility] CATH LAB;  Service: Cardiology;  Laterality: N/A;    COLONOSCOPY  8/2013    COLONOSCOPY N/A 5/30/2016    Procedure: COLONOSCOPY;  Surgeon: Anna Tomas MD;  Location: [facility] ENDO;  Service: Endoscopy;  Laterality: N/A;    COLONOSCOPY N/A 7/17/2019    Procedure: COLONOSCOPY;  Surgeon: David Carter III, MD;  Location: [facility] ENDO;  Service: Endoscopy;  Laterality: N/A;    COLONOSCOPY N/A 12/14/2023    Procedure: COLONOSCOPY;  Surgeon: Ama Barrera MD;  Location: [facility] ENDO;  Service: Endoscopy;  Laterality: N/A;    COMPUTED TOMOGRAPHY N/A 9/9/2019    Procedure: CT (COMPUTED TOMOGRAPHY);  Surgeon: Sri Diagnostic Provider;  Location: [facility] OR;  Service: General;  Laterality: N/A;  Microwave ablation to be done in CT.  Need CRNA and cart    COMPUTED TOMOGRAPHY N/A 1/25/2022     Procedure: Liver Microwave Ablation;  Surgeon: Red Puentes MD;  Location: [facility];  Service: General;  Laterality: N/A;    CORONARY ARTERY BYPASS GRAFT (CABG) N/A 11/5/2018    Procedure: CORONARY ARTERY BYPASS GRAFT (CABG);  Surgeon: Bear De Luna MD;  Location: [facility] OR;  Service: Cardiothoracic;  Laterality: N/A;  TWO VESSEL BYPASS WITH AORTOTOMY AND EXCISION OF ATHEROSCLEROTIC PLAQUE    CORONARY BYPASS GRAFT ANGIOGRAPHY  3/2/2020    Procedure: Bypass graft study;  Surgeon: Cora Cormier MD;  Location: [facility] CATH LAB;  Service: Cardiology;;    ELBOW BURSA SURGERY Right 2010    ENDOSCOPIC HARVEST OF VEIN Left 11/5/2018    Procedure: SURGICAL PROCUREMENT, VEIN, ENDOSCOPIC;  Surgeon: Bear De Luna MD;  Location: [facility] OR;  Service: Cardiothoracic;  Laterality: Left;    ESOPHAGOGASTRODUODENOSCOPY N/A 7/17/2019    Procedure: ESOPHAGOGASTRODUODENOSCOPY (EGD);  Surgeon: David Carter III, MD;  Location: [facility] ENDO;  Service: Endoscopy;  Laterality: N/A;    ESOPHAGOGASTRODUODENOSCOPY N/A 12/29/2022    Procedure: ESOPHAGOGASTRODUODENOSCOPY (EGD);  Surgeon: Issa Gayle MD;  Location: [facility];  Service: Endoscopy;  Laterality: N/A;    ETHMOIDECTOMY Bilateral 3/27/2019    Procedure: ETHMOIDECTOMY;  Surgeon: Alejandro Parkinson MD;  Location: [facility] OR;  Service: ENT;  Laterality: Bilateral;    EYE SURGERY      FOOT SURGERY      FRONTAL SINUS OBLITERATION Bilateral 3/27/2019    Procedure: SINUSOTOMY, FRONTAL SINUS, OBLITERATIVE;  Surgeon: Alejandro Parkinson MD;  Location: [facility];  Service: ENT;  Laterality: Bilateral;    FUNCTIONAL ENDOSCOPIC SINUS SURGERY (FESS) Bilateral 3/27/2019    Procedure: FESS (FUNCTIONAL ENDOSCOPIC SINUS SURGERY);  Surgeon: Alejandro Parkinson MD;  Location: [facility];  Service: ENT;  Laterality: Bilateral;  Left Keila bullosa resection     KNEE ARTHROSCOPY W/ MENISCAL REPAIR Left 06/2019    dr boykin    KNEE SURGERY Right     LEFT HEART CATHETERIZATION Left 3/2/2020    Procedure: CATHETERIZATION,  HEART, LEFT;  Surgeon: Cora Cormier MD;  Location: [facility] CATH LAB;  Service: Cardiology;  Laterality: Left;    LIVER BIOPSY  01/2022    MAXILLARY ANTROSTOMY Bilateral 3/27/2019    Procedure: MAXILLARY ANTROSTOMY;  Surgeon: Alejandro Parkinson MD;  Location: [facility] OR;  Service: ENT;  Laterality: Bilateral;    NASAL SEPTOPLASTY N/A 3/27/2019    Procedure: SEPTOPLASTY, NOSE;  Surgeon: Alejandro Parkinson MD;  Location: [facility] OR;  Service: ENT;  Laterality: N/A;    RECTAL SURGERY  2012    TRANSURETHRAL RESECTION OF PROSTATE (TURP) WITHOUT USE OF LASER N/A 6/19/2018    Procedure: TURP, WITHOUT USING LASER;  Surgeon: Cooper Cordova IV, MD;  Location: [facility] OR;  Service: Urology;  Laterality: N/A;    TRIGGER FINGER RELEASE Left          Current Outpatient Medications:     ACCU-CHEK GRACE PLUS METER Misc, AS DIRECTED, Disp: 1 each, Rfl: 0    albuterol (VENTOLIN HFA) 90 mcg/actuation inhaler, Inhale 2 puffs into the lungs every 6 (six) hours as needed for Wheezing or Shortness of Breath. Rescue, Disp: 18 g, Rfl: 3    amLODIPine (NORVASC) 10 MG tablet, TAKE 1 TABLET BY MOUTH ONCE A DAY (DOSE INCREASE), Disp: 30 tablet, Rfl: 11    aspirin (ECOTRIN) 81 MG EC tablet, Take 81 mg by mouth once daily., Disp: , Rfl:     blood sugar diagnostic (ACCU-CHEK GRACE PLUS TEST STRP) Strp, TEST THREE TIMES A DAY, Disp: 100 strip, Rfl: 11    budesonide-formoterol 160-4.5 mcg (SYMBICORT) 160-4.5 mcg/actuation HFAA, INHALE 2 PUFFS INTO THE LUNGS TWO TIMES A DAY., Disp: 10.2 g, Rfl: 11    carvediloL (COREG) 3.125 MG tablet, Take 1 tablet (3.125 mg total) by mouth 2 (two) times daily with meals., Disp: 60 tablet, Rfl: 5    diphenhydrAMINE (BENADRYL) 50 MG capsule, Take 50mg by mouth 1 hour before contrast administration., Disp: 1 capsule, Rfl: 0    empagliflozin (JARDIANCE) 25 mg tablet, Take 1 tablet (25 mg total) by mouth once daily., Disp: 90 tablet, Rfl: 1    famotidine (PEPCID) 40 MG tablet, TAKE 1 TABLET BY MOUTH EVERY EVENING, Disp: 30 tablet, Rfl: 11     "finasteride (PROSCAR) 5 mg tablet, TAKE 1 TABLET BY MOUTH once daily, Disp: 90 tablet, Rfl: 2    fluticasone propionate (FLONASE) 50 mcg/actuation nasal spray, 2 sprays (100 mcg total) by Each Nostril route once daily., Disp: 18.2 mL, Rfl: 6    furosemide (LASIX) 40 MG tablet, Take 1 tablet (40 mg total) by mouth 2 (two) times daily., Disp: 60 tablet, Rfl: 11    GLUCOSAMINE HCL/CHONDR ROSARIO A NA (OSTEO BI-FLEX ORAL), Take 1 tablet by mouth 2 (two) times daily. , Disp: , Rfl:     insulin (LANTUS SOLOSTAR U-100 INSULIN) glargine 100 units/mL SubQ pen, Inject 44 Units into the skin every evening., Disp: 45 mL, Rfl: 1    ipratropium (ATROVENT) 21 mcg (0.03 %) nasal spray, 2 sprays by Each Nostril route 2 (two) times daily., Disp: 30 mL, Rfl: 0    krill oil 500 mg Cap, Take by mouth., Disp: , Rfl:     lancets (ACCU-CHEK FASTCLIX LANCET DRUM) Misc, TEST TWO TIMES A DAY, Disp: 102 each, Rfl: 5    levocetirizine (XYZAL) 5 MG tablet, Take 1 tablet (5 mg total) by mouth every evening., Disp: 30 tablet, Rfl: 11    levothyroxine (SYNTHROID) 300 MCG Tab, Take 1 tablet (300 mcg total) by mouth every morning., Disp: 90 tablet, Rfl: 3    lisinopriL 10 MG tablet, TAKE 1 BY MOUTH EVERY DAY (Patient taking differently: 40 mg.), Disp: 30 tablet, Rfl: 7    meloxicam (MOBIC) 15 MG tablet, Take 15 mg by mouth., Disp: , Rfl:     metFORMIN (GLUCOPHAGE-XR) 500 MG ER 24hr tablet, Take 1 tablet (500 mg total) by mouth 2 (two) times daily with meals., Disp: 180 tablet, Rfl: 3    montelukast (SINGULAIR) 10 mg tablet, Take 1 tablet (10 mg total) by mouth once daily., Disp: 90 tablet, Rfl: 3    pen needle, diabetic (BD ULTRA-FINE MINI PEN NEEDLE) 31 gauge x 3/16" Ndle, USE AS DIRECTED, Disp: 100 each, Rfl: 11    predniSONE (DELTASONE) 50 MG Tab, Take 50mg by mouth at 13 hours, 7 hours, and 1 hour before contrast administration., Disp: 3 tablet, Rfl: 0    propafenone (RHTHYMOL) 150 MG Tab, Take 1 tablet (150 mg total) by mouth every 8 (eight) hours., " Disp: 90 tablet, Rfl: 11    RABEprazole (ACIPHEX) 20 mg tablet, Take 1 tablet (20 mg total) by mouth 2 (two) times daily., Disp: 180 tablet, Rfl: 3    REPATHA SURECLICK 140 mg/mL PnIj, SMARTSI Milligram(s) Topical Every 2 Weeks, Disp: , Rfl:     rivaroxaban (XARELTO) 20 mg Tab, Take 1 tablet (20 mg total) by mouth daily with dinner or evening meal., Disp: 30 tablet, Rfl: 11    sildenafiL (VIAGRA) 100 MG tablet, Take 1/2 to 1 tablet by mouth one hour prior to intercourse. Max 100mg per day, Disp: 30 tablet, Rfl: 11    azelastine (ASTELIN) 137 mcg (0.1 %) nasal spray, 2 sprays (274 mcg total) by Nasal route 2 (two) times daily., Disp: 30 mL, Rfl: 6  No current facility-administered medications for this visit.    Facility-Administered Medications Ordered in Other Visits:     lactated ringers infusion, , Intravenous, Continuous, Omaira Theodore MD, New Bag at [phone]    Review of patient's allergies indicates:   Allergen Reactions    Lipitor [atorvastatin] Other (See Comments)     Muscle aches and pains as well as fatigue.     Niacin preparations Other (See Comments)     Causes broken blood vessels and bruises at 4 times normal dose.    Statins-hmg-coa reductase inhibitors Other (See Comments)     Statins cause intolerable myalgias.    Iodinated contrast media Hives     Tachycardia    Omeprazole Hives and Itching    Prilosec [omeprazole magnesium] Hives and Itching       Family History   Problem Relation Age of Onset    Heart disease Mother     Heart disease Father     Heart disease Brother     Colon cancer Neg Hx        Social History     Tobacco Use    Smoking status: Former     Current packs/day: 0.00     Average packs/day: 0.5 packs/day for 4.0 years (2.0 ttl pk-yrs)     Types: Cigarettes     Start date: 1968     Quit date: 1972     Years since quittin.5    Smokeless tobacco: Former     Types: Chew     Quit date: 1976   Substance Use Topics    Alcohol use: Yes      "Alcohol/week: 2.0 standard drinks of alcohol     Types: 2 Cans of beer per week    Drug use: No       REVIEW OF SYSTEMS:  General: No chills, fever, malaise, changes in weight  HEENT: No visual changes, difficulty hearing  Cardiovascular: No chest pain, palpitations, claudication  Pulmonary: No dyspnea, cough, wheezing  Gastrointestinal: No nausea, vomiting, diarrhea, constipation  Genitourinary: No dysuria, low urine output, hematuria  Endocrine: No polydipsia, polyphagia  Hematologic: No fatigue with exertion, pica, pallor  Musculoskeletal: No extremity or joint pain, no back pain, no difficulty with ambulation  Neurologic: no seizures, no headaches, no weakness  Psychiatric: no mood disturbance      PHYSICAL EXAM:   BP (!) 141/73 (BP Location: Right arm, Patient Position: Sitting, BP Method: Large (Automatic))   Pulse 75   Temp 98.5 °F (36.9 °C) (Oral)   Ht 5' 7" (1.702 m)   Wt 101 kg (222 lb 10.6 oz)   BMI 34.87 kg/m²   Constitutional:  Alert,   Well-appearing  In no distress.  BMI 34.8   Neurological: Normal speech  no focal findings  CN II - XII grossly intact.    Psychiatric: Mood and affect appropriate and symmetric.   HEENT: Normocephalic / atraumatic  PERRLA  Midline trachea  No scars across the neck   Cardiac: Regular rate and rhythm.   Pulmonary: Normal pulmonary effort.   Abdomen: Soft, not distended.     Skin: Warm and well perfused.    Vascular:  Femoral pulses 1 to 2+ bilaterally   Extremities/  Musculoskeletal: No edema.        IMAGING:  Duplex ultra sound today suggests very high-grade celiac artery stenosis with a peak systolic velocity of 324.  Proximal SMA stenosis of  277 is noted    Recent CTA was personally reviewed.  This demonstrates a very very calcific proximal SMA with either a very very high-grade stenosis versus short occlusion.  Small amount of non flow-limiting laminated thrombus appears to be present just distal to this area.    Celiac Artery appears to have a high-grade ostial " stenosis.  This vessel was quite large.    The YASIR is patent, does not have a large meandering vessel    Right common femoral artery has significant , left common femoral artery less so    IMPRESSION:  While his clinical presentation of abdominal pain 5-6 hours after a large meal is distinctly atypical for chronic mesenteric ischemia, the findings of the ischemic ulceration in his colon, and certainly his anatomy is very concerning for a compromised blood flow to his intra-abdominal viscera.  Intervention is warranted    PLAN:   Left common femoral approach, possible SMA and were celiac artery stent placement.  01/04/2024.     Will require a steerable sheath  Consider GETA to allow placement of his arms above his head for better visualization of his mesenteric vessels from a lateral projection.  Hold Xarelto for 2 days prior, continue aspirin    I have explained the risks, benefits and alternatives for this procedure in detail.  The patient voices understanding and all questions have be answered, and agrees to proceed with the procedure.     MARIUSZ Alfred III, MD, FACS  Professor and Chief, Vascular and Endovascular Surgery

## 2023-12-29 ENCOUNTER — TELEPHONE (OUTPATIENT)
Dept: VASCULAR SURGERY | Facility: CLINIC | Age: 78
End: 2023-12-29
Payer: MEDICARE

## 2023-12-29 NOTE — TELEPHONE ENCOUNTER
Received call from pt stating new Rx for prednisone prep for procedure with Dr. Alfred on 1/4/24 has not been received by his doctor. Nurse confirmed pt's preferred pharmacy. Rx called in to pt's preferred pharmacy.

## 2024-01-03 ENCOUNTER — TELEPHONE (OUTPATIENT)
Dept: VASCULAR SURGERY | Facility: CLINIC | Age: 79
End: 2024-01-03
Payer: MEDICARE

## 2024-01-03 ENCOUNTER — ANESTHESIA EVENT (OUTPATIENT)
Dept: SURGERY | Facility: HOSPITAL | Age: 79
DRG: 357 | End: 2024-01-03
Payer: MEDICARE

## 2024-01-03 NOTE — TELEPHONE ENCOUNTER
Confirmed with pt that he has been holding Xarelto since Monday 01/01/24 in preparation for procedure tomorrow with Dr. Alfred. Reviewed instructions for prednisone prep with pt. Pt repeated instructions and verbalized understanding.

## 2024-01-03 NOTE — ANESTHESIA PREPROCEDURE EVALUATION
[facility]  Anesthesia Pre-Operative Evaluation         Patient Name: Erendira Pretty  YOB: 1945  MRN: [id number]    SUBJECTIVE:     Pre-operative evaluation for Procedure(s) (LRB):  ANGIOGRAM, EXTREMITY, UNILATERAL- Femoral / SMS Stent, Poss General (Left)     01/03/2024    Erendira Pretty is a 78 y.o. male w/ a significant PMHx of history of paroxysmal AFib on Xarelto, HTN, hypothyroidism, CAD s/p CABGx2, DELONTE on CPAP, DM2, HLD, bilateral carotid stenosis, aortic stenosis, hepatocellular carcinoma status post ablation, and chronic mesenteric ischemia.     Patient now presents for the above procedure(s).    Stress Test: 8/28/23  ormal myocardial perfusion scan. There is no evidence of myocardial ischemia or infarction.    The gated perfusion images showed an ejection fraction of 62% at rest. The gated perfusion images showed an ejection fraction of 72% post stress.    The ECG portion of the study is negative for ischemia.    The patient reported no chest pain during the stress test.    The Baseline ECG reveals atrial fibrillation.    TTE:8/28/23    Left Ventricle: The left ventricle is normal in size. Normal wall thickness. There is mild concentric hypertrophy. Normal wall motion. There is normal systolic function. Ejection fraction by visual approximation is 55%. There is normal diastolic function.    Right Ventricle: Normal right ventricular cavity size. Wall thickness is normal. Right ventricle wall motion  is normal. Systolic function is normal.    Aortic Valve: There is moderate stenosis. Aortic valve area by VTI is 1.06 cm². Aortic valve peak velocity is 3.05 m/s. Mean gradient is 22 mmHg. The dimensionless index is 0.30. There is mild to moderate aortic regurgitation.    Mitral Valve:  There is mild regurgitation.    Tricuspid Valve: There is mild regurgitation.    Pulmonary Artery: The estimated pulmonary artery systolic pressure is 30 mmHg.       LDA:  None documented     Prev  airway: 1/25/22  Intubation:     Induction:  Intravenous    Intubated:  Postinduction    Mask Ventilation:  Easy with oral airway    Attempts:  1    Attempted By:  CRNA    Method of Intubation:  Direct    Blade:  Beauchamp 2    Laryngeal View Grade: Grade IIA - cords partially seen      Difficult Airway Encountered?: No      Complications:  None    Airway Device:  Oral endotracheal tube    Airway Device Size:  7.5    Style/Cuff Inflation:  Cuffed (inflated to minimal occlusive pressure)    Tube secured:  21    Secured at:  The lips    Placement Verified By:  Capnometry    Complicating Factors:  Obesity, short neck, poor neck/head extension and oropharyngeal edema or fat    Findings Post-Intubation:  BS equal bilateral and atraumatic/condition of teeth unchanged    Drips: None documented.    Patient Active Problem List   Diagnosis    Essential hypertension    BMI 36.0-36.9,adult    PVC (premature ventricular contraction)    Acquired hypothyroidism    Coronary artery disease involving native coronary artery of native heart without angina pectoris    Dryness, eye - Both Eyes    Epiretinal membrane - Both Eyes    PVD (peripheral vascular disease)    ED (erectile dysfunction)    Anorectal fistula    Other cirrhosis of liver    Benign prostatic hyperplasia with nocturia    DELONTE on CPAP    Mild intermittent asthma without complication    Pseudophakia    Colon polyps    Anal fissure    Secondary esophageal varices without bleeding    Asbestos exposure    Chest pain, moderate coronary artery risk    Aortic stenosis mild    Urethral polyp    Angina pectoris    Left main coronary artery disease    Mixed hyperlipidemia    S/P CABG x 2    Pain in both lower extremities    Chronic pansinusitis    Nasal polyposis    Non-seasonal allergic rhinitis due to pollen    Iron deficiency anemia due to chronic blood loss    GERD (gastroesophageal reflux disease)    HCC (hepatocellular carcinoma)    DNR (do not resuscitate)    Open angle with  borderline findings and low glaucoma risk in both eyes    Aortic atherosclerosis    Type 2 diabetes mellitus with microalbuminuria, with long-term current use of insulin    Bilateral carotid artery stenosis    Paroxysmal atrial fibrillation    Statin myopathy    Dizziness    Lower abdominal pain    Mesenteric artery stenosis       Review of patient's allergies indicates:   Allergen Reactions    Lipitor [atorvastatin] Other (See Comments)     Muscle aches and pains as well as fatigue.     Niacin preparations Other (See Comments)     Causes broken blood vessels and bruises at 4 times normal dose.    Statins-hmg-coa reductase inhibitors Other (See Comments)     Statins cause intolerable myalgias.    Iodinated contrast media Hives     Tachycardia    Omeprazole Hives and Itching    Prilosec [omeprazole magnesium] Hives and Itching       Current Outpatient Medications:  No current facility-administered medications for this encounter.    Current Outpatient Medications:     amLODIPine (NORVASC) 10 MG tablet, TAKE 1 TABLET BY MOUTH ONCE A DAY (DOSE INCREASE) (Patient taking differently: Take by mouth every morning.), Disp: 30 tablet, Rfl: 11    aspirin (ECOTRIN) 81 MG EC tablet, Take 81 mg by mouth every morning., Disp: , Rfl:     carvediloL (COREG) 3.125 MG tablet, Take 1 tablet (3.125 mg total) by mouth 2 (two) times daily with meals., Disp: 60 tablet, Rfl: 5    empagliflozin (JARDIANCE) 25 mg tablet, Take 1 tablet (25 mg total) by mouth once daily. (Patient taking differently: Take 25 mg by mouth every morning.), Disp: 90 tablet, Rfl: 1    famotidine (PEPCID) 40 MG tablet, TAKE 1 TABLET BY MOUTH EVERY EVENING, Disp: 30 tablet, Rfl: 11    finasteride (PROSCAR) 5 mg tablet, TAKE 1 TABLET BY MOUTH once daily (Patient taking differently: Take 5 mg by mouth every evening.), Disp: 90 tablet, Rfl: 2    furosemide (LASIX) 40 MG tablet, Take 1 tablet (40 mg total) by mouth 2 (two) times daily., Disp: 60 tablet, Rfl: 11     GLUCOSAMINE HCL/CHONDR ROSARIO A NA (OSTEO BI-FLEX ORAL), Take 1 tablet by mouth 2 (two) times daily. , Disp: , Rfl:     insulin (LANTUS SOLOSTAR U-100 INSULIN) glargine 100 units/mL SubQ pen, Inject 44 Units into the skin every evening., Disp: 45 mL, Rfl: 1    krill oil 500 mg Cap, Take by mouth., Disp: , Rfl:     levothyroxine (SYNTHROID) 300 MCG Tab, Take 1 tablet (300 mcg total) by mouth every morning., Disp: 90 tablet, Rfl: 3    lisinopriL 10 MG tablet, TAKE 1 BY MOUTH EVERY DAY (Patient taking differently: Take 10 mg by mouth every morning.), Disp: 30 tablet, Rfl: 7    metFORMIN (GLUCOPHAGE-XR) 500 MG ER 24hr tablet, Take 1 tablet (500 mg total) by mouth 2 (two) times daily with meals., Disp: 180 tablet, Rfl: 3    montelukast (SINGULAIR) 10 mg tablet, Take 1 tablet (10 mg total) by mouth once daily. (Patient taking differently: Take 10 mg by mouth every evening.), Disp: 90 tablet, Rfl: 3    propafenone (RHTHYMOL) 150 MG Tab, Take 1 tablet (150 mg total) by mouth every 8 (eight) hours., Disp: 90 tablet, Rfl: 11    RABEprazole (ACIPHEX) 20 mg tablet, Take 1 tablet (20 mg total) by mouth 2 (two) times daily., Disp: 180 tablet, Rfl: 3    REPATHA SURECLICK 140 mg/mL PnIj, SMARTSI Milligram(s) Topical Every 2 Weeks, Disp: , Rfl:     rivaroxaban (XARELTO) 20 mg Tab, Take 1 tablet (20 mg total) by mouth daily with dinner or evening meal., Disp: 30 tablet, Rfl: 11    ACCU-CHEK GRACE PLUS METER Misc, AS DIRECTED, Disp: 1 each, Rfl: 0    albuterol (VENTOLIN HFA) 90 mcg/actuation inhaler, Inhale 2 puffs into the lungs every 6 (six) hours as needed for Wheezing or Shortness of Breath. Rescue, Disp: 18 g, Rfl: 3    azelastine (ASTELIN) 137 mcg (0.1 %) nasal spray, 2 sprays (274 mcg total) by Nasal route 2 (two) times daily., Disp: 30 mL, Rfl: 6    blood sugar diagnostic (ACCU-CHEK GRACE PLUS TEST STRP) Strp, TEST THREE TIMES A DAY, Disp: 100 strip, Rfl: 11    budesonide-formoterol 160-4.5 mcg (SYMBICORT) 160-4.5  "mcg/actuation HFAA, INHALE 2 PUFFS INTO THE LUNGS TWO TIMES A DAY., Disp: 10.2 g, Rfl: 11    diphenhydrAMINE (BENADRYL) 50 MG capsule, Take 50mg by mouth 1 hour before contrast administration., Disp: 1 capsule, Rfl: 0    fluticasone propionate (FLONASE) 50 mcg/actuation nasal spray, 2 sprays (100 mcg total) by Each Nostril route once daily., Disp: 18.2 mL, Rfl: 6    ipratropium (ATROVENT) 21 mcg (0.03 %) nasal spray, 2 sprays by Each Nostril route 2 (two) times daily., Disp: 30 mL, Rfl: 0    lancets (ACCU-CHEK FASTCLIX LANCET DRUM) Misc, TEST TWO TIMES A DAY, Disp: 102 each, Rfl: 5    levocetirizine (XYZAL) 5 MG tablet, Take 1 tablet (5 mg total) by mouth every evening., Disp: 30 tablet, Rfl: 11    meloxicam (MOBIC) 15 MG tablet, Take 15 mg by mouth., Disp: , Rfl:     pen needle, diabetic (BD ULTRA-FINE MINI PEN NEEDLE) 31 gauge x 3/16" Ndle, USE AS DIRECTED, Disp: 100 each, Rfl: 11    predniSONE (DELTASONE) 50 MG Tab, Take 50mg by mouth at 13 hours, 7 hours, and 1 hour before contrast administration., Disp: 3 tablet, Rfl: 0    sildenafiL (VIAGRA) 100 MG tablet, Take 1/2 to 1 tablet by mouth one hour prior to intercourse. Max 100mg per day, Disp: 30 tablet, Rfl: 11    Facility-Administered Medications Ordered in Other Encounters:     lactated ringers infusion, , Intravenous, Continuous, Omaira Theodore MD, New Bag at 09/09/19 1117    Past Surgical History:   Procedure Laterality Date    CARDIAC CATHETERIZATION      CARDIAC SURGERY      CARPAL TUNNEL RELEASE Left     CATARACT EXTRACTION      CATARACT EXTRACTION W/ INTRAOCULAR LENS  IMPLANT, BILATERAL  2008    CATHETERIZATION OF BOTH LEFT AND RIGHT HEART N/A 11/2/2018    Procedure: CATHETERIZATION, HEART, BOTH LEFT AND RIGHT;  Surgeon: Frank Gallardo MD;  Location: [facility] CATH LAB;  Service: Cardiology;  Laterality: N/A;    COLONOSCOPY  8/2013    COLONOSCOPY N/A 5/30/2016    Procedure: COLONOSCOPY;  Surgeon: Anna Tomas MD;  Location: [facility];  Service: " Endoscopy;  Laterality: N/A;    COLONOSCOPY N/A 7/17/2019    Procedure: COLONOSCOPY;  Surgeon: David Carter III, MD;  Location: [facility] ENDO;  Service: Endoscopy;  Laterality: N/A;    COLONOSCOPY N/A 12/14/2023    Procedure: COLONOSCOPY;  Surgeon: Ama Barrera MD;  Location: [facility] ENDO;  Service: Endoscopy;  Laterality: N/A;    COMPUTED TOMOGRAPHY N/A 9/9/2019    Procedure: CT (COMPUTED TOMOGRAPHY);  Surgeon: [facility] Provider;  Location: [facility] OR;  Service: General;  Laterality: N/A;  Microwave ablation to be done in CT.  Need CRNA and cart    COMPUTED TOMOGRAPHY N/A 1/25/2022    Procedure: Liver Microwave Ablation;  Surgeon: Red Puentes MD;  Location: [facility];  Service: General;  Laterality: N/A;    CORONARY ARTERY BYPASS GRAFT (CABG) N/A 11/5/2018    Procedure: CORONARY ARTERY BYPASS GRAFT (CABG);  Surgeon: Bear De Luna MD;  Location: [facility] OR;  Service: Cardiothoracic;  Laterality: N/A;  TWO VESSEL BYPASS WITH AORTOTOMY AND EXCISION OF ATHEROSCLEROTIC PLAQUE    CORONARY BYPASS GRAFT ANGIOGRAPHY  3/2/2020    Procedure: Bypass graft study;  Surgeon: Cora Cormier MD;  Location: [facility] CATH LAB;  Service: Cardiology;;    ELBOW BURSA SURGERY Right 2010    ENDOSCOPIC HARVEST OF VEIN Left 11/5/2018    Procedure: SURGICAL PROCUREMENT, VEIN, ENDOSCOPIC;  Surgeon: Bear De Luna MD;  Location: [facility];  Service: Cardiothoracic;  Laterality: Left;    ESOPHAGOGASTRODUODENOSCOPY N/A 7/17/2019    Procedure: ESOPHAGOGASTRODUODENOSCOPY (EGD);  Surgeon: David Carter III, MD;  Location: [facility] ENDO;  Service: Endoscopy;  Laterality: N/A;    ESOPHAGOGASTRODUODENOSCOPY N/A 12/29/2022    Procedure: ESOPHAGOGASTRODUODENOSCOPY (EGD);  Surgeon: Issa Gayle MD;  Location: [facility] ENDO;  Service: Endoscopy;  Laterality: N/A;    ETHMOIDECTOMY Bilateral 3/27/2019    Procedure: ETHMOIDECTOMY;  Surgeon: Alejandro Parkinson MD;  Location: [facility] OR;  Service: ENT;  Laterality: Bilateral;    EYE SURGERY      FOOT  SURGERY      FRONTAL SINUS OBLITERATION Bilateral 3/27/2019    Procedure: SINUSOTOMY, FRONTAL SINUS, OBLITERATIVE;  Surgeon: Alejandro Parkinson MD;  Location: [facility] OR;  Service: ENT;  Laterality: Bilateral;    FUNCTIONAL ENDOSCOPIC SINUS SURGERY (FESS) Bilateral 3/27/2019    Procedure: FESS (FUNCTIONAL ENDOSCOPIC SINUS SURGERY);  Surgeon: Alejandro Parkinson MD;  Location: [facility] OR;  Service: ENT;  Laterality: Bilateral;  Left Keila bullosa resection     KNEE ARTHROSCOPY W/ MENISCAL REPAIR Left 2019    dr boykin    KNEE SURGERY Right     LEFT HEART CATHETERIZATION Left 3/2/2020    Procedure: CATHETERIZATION, HEART, LEFT;  Surgeon: Cora Cormier MD;  Location: [facility] CATH LAB;  Service: Cardiology;  Laterality: Left;    LIVER BIOPSY  2022    MAXILLARY ANTROSTOMY Bilateral 3/27/2019    Procedure: MAXILLARY ANTROSTOMY;  Surgeon: Alejandro Parkinson MD;  Location: [facility] OR;  Service: ENT;  Laterality: Bilateral;    NASAL SEPTOPLASTY N/A 3/27/2019    Procedure: SEPTOPLASTY, NOSE;  Surgeon: Alejandro Parkinson MD;  Location: [facility] OR;  Service: ENT;  Laterality: N/A;    RECTAL SURGERY      TRANSURETHRAL RESECTION OF PROSTATE (TURP) WITHOUT USE OF LASER N/A 2018    Procedure: TURP, WITHOUT USING LASER;  Surgeon: Cooper Cordova IV, MD;  Location: [facility];  Service: Urology;  Laterality: N/A;    TRIGGER FINGER RELEASE Left        Social History     Socioeconomic History    Marital status:      Spouse name: YAEL    Number of children: 2   Tobacco Use    Smoking status: Former     Current packs/day: 0.00     Average packs/day: 0.5 packs/day for 4.0 years (2.0 ttl pk-yrs)     Types: Cigarettes     Start date: 1968     Quit date: 1972     Years since quittin.5    Smokeless tobacco: Former     Types: Chew     Quit date: 1976   Substance and Sexual Activity    Alcohol use: Yes     Alcohol/week: 2.0 standard drinks of alcohol     Types: 2 Cans of beer per week    Drug use: No    Sexual activity: Yes     Partners: Female    Social History Narrative    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    No pets or smokers in household, lives alone.       OBJECTIVE:     Vital Signs Range (Last 24H):         Significant Labs:  Lab Results   Component Value Date    WBC 7.19 11/21/2023    HGB 14.1 11/21/2023    HCT 43.2 11/21/2023     11/21/2023    CHOL 90 (L) 10/09/2023    TRIG 85 10/09/2023    HDL 37 (L) 10/09/2023    ALT 18 11/21/2023    AST 20 11/21/2023     11/21/2023    K 4.6 11/21/2023     11/21/2023    CREATININE 1.2 11/21/2023    BUN 17 11/21/2023    CO2 25 11/21/2023    TSH 4.701 (H) 10/09/2023    PSA 0.17 10/09/2023    INR 1.2 12/05/2023    GLUF 130 (H) 05/20/2009    HGBA1C 6.3 (H) 10/09/2023       Diagnostic Studies: No relevant studies.    EKG:   Results for orders placed or performed during the hospital encounter of 05/18/23   SCHEDULED EKG 12-LEAD (to Muse)    Collection Time: 05/18/23 11:56 AM    Narrative    Test Reason : R55,    Vent. Rate : 069 BPM     Atrial Rate : 069 BPM     P-R Int : 322 ms          QRS Dur : 080 ms      QT Int : 388 ms       P-R-T Axes : 048 022 036 degrees     QTc Int : 415 ms    Sinus rhythm with marked sinus arrythmia with 1st degree A-V block  Otherwise normal ECG  When compared with ECG of 13-APR-2021 10:37,  Premature atrial complexes are no longer Present  Confirmed by PREMA CHRISTIANSON MD ([id number]) on 5/18/2023 6:06:34 PM    Referred By: BEE GREENFIELD           Confirmed By:PREMA CHRISTIANSON MD       2D ECHO:  TTE:  Results for orders placed or performed during the hospital encounter of 08/28/23   Echo   Result Value Ref Range    BSA 2.21 m2    LVOT stroke volume 72.65 cm3    LVIDd 4.23 3.5 - 6.0 cm    LV Systolic Volume 36.61 mL    LV Systolic Volume Index 17.2 mL/m2    LVIDs 3.06 2.1 - 4.0 cm    LV Diastolic Volume 79.83 mL    LV Diastolic Volume Index 37.48 mL/m2    IVS 1.41 (A) 0.6 - 1.1 cm    LVOT diameter 2.13 cm    LVOT area  3.6 cm2    FS 28 28 - 44 %    Left Ventricle Relative Wall Thickness 0.57 cm    Posterior Wall 1.21 (A) 0.6 - 1.1 cm    LV mass 205.05 g    LV Mass Index 96 g/m2    MV Peak E Delbert 1.07 m/s    MV Peak A Delbert 1.05 m/s    TR Max Delbert 2.62 m/s    E/A ratio 1.02     E wave deceleration time 251.38 msec    LVOT peak delbert 0.99 m/s    Left Ventricular Outflow Tract Mean Velocity 0.69 cm/s    Left Ventricular Outflow Tract Mean Gradient 2.19 mmHg    LA volume (mod) 71.06 cm3    LA Volume Index (Mod) 33.4 mL/m2    LA size 3.71 cm    Left Atrium Major Axis 4.07 cm    Left Atrium Minor Axis 4.99 cm    RVDD 3.83 cm    RVOT peak VTI 13.7 cm    RA Major Axis 4.43 cm    RA Width 5.10 cm    AV regurgitation pressure 1/2 time [id number] ms    AR Max Delbert 4.10 m/s    AV mean gradient 22 mmHg    AV peak gradient 37 mmHg    Ao peak delbert 3.05 m/s    Ao VTI 68.30 cm    LVOT peak VTI 20.40 cm    AV valve area 1.06 cm²    AV Velocity Ratio 0.32     AV index (prosthetic) 0.30     RADHA by Velocity Ratio 1.16 cm²    MV stenosis pressure 1/2 time 72.90 ms    MV valve area p 1/2 method 3.02 cm2    Triscuspid Valve Regurgitation Peak Gradient 27 mmHg    PV mean gradient 1 mmHg    PV PEAK VELOCITY 1.26 m/s    PV peak gradient 6 mmHg    Pulmonary Valve Mean Velocity 0.78 m/s    RVOT peak delbert 0.77 m/s    Ao root annulus 3.66 cm    Sinus 3.43 cm    STJ 2.72 cm    Ascending aorta 3.44 cm    IVC diameter 1.74 cm    ZLVIDS -2.39     ZLVIDD -4.73     LA Volume Index 28.5 mL/m2    LA volume 60.79 cm3    LA WIDTH 4.3 cm    TV resting pulmonary artery pressure 30 mmHg    RV TB RVSP 6 mmHg    Est. RA pres 3 mmHg    EF 55 %    Narrative      Left Ventricle: The left ventricle is normal in size. Normal wall   thickness. There is mild concentric hypertrophy. Normal wall motion. There   is normal systolic function. Ejection fraction by visual approximation is   55%. There is normal diastolic function.    Right Ventricle: Normal right ventricular cavity size.  Wall thickness   is normal. Right ventricle wall motion  is normal. Systolic function is   normal.    Aortic Valve: There is moderate stenosis. Aortic valve area by VTI is   1.06 cm². Aortic valve peak velocity is 3.05 m/s. Mean gradient is 22   mmHg. The dimensionless index is 0.30. There is mild to moderate aortic   regurgitation.    Mitral Valve:  There is mild regurgitation.    Tricuspid Valve: There is mild regurgitation.    Pulmonary Artery: The estimated pulmonary artery systolic pressure is   30 mmHg.         BERNARD:  No results found. However, due to the size of the patient record, not all encounters were searched. Please check Results Review for a complete set of results.    ASSESSMENT/PLAN:                                                                                                                  01/03/2024  Erendira Pretty is a 78 y.o., male.      Pre-op Assessment    I have reviewed the Patient Summary Reports.     I have reviewed the Nursing Notes. I have reviewed the NPO Status.   I have reviewed the Medications.     Review of Systems  Anesthesia Hx:   History of prior surgery of interest to airway management or planning:          Denies Family Hx of Anesthesia complications.     Cardiovascular:     Hypertension Valvular problems/Murmurs, AS  CAD   CABG/stent   Angina                                  Pulmonary:   COPD Asthma    Sleep Apnea, CPAP                Hepatic/GI:   PUD,  GERD             Endocrine:  Diabetes, type 2 Hypothyroidism        Obesity / BMI > 30      Physical Exam  General: Well nourished, Cooperative, Alert and Oriented    Airway:  Mallampati: I   Mouth Opening: Normal  TM Distance: Normal  Tongue: Normal  Neck ROM: Normal ROM    Dental:  Periodontal disease, Dentures    Chest/Lungs:  Clear to auscultation, Normal Respiratory Rate    Heart:  Rate: Normal  Rhythm: Regular Rhythm        Anesthesia Plan  Type of Anesthesia, risks & benefits discussed:    Anesthesia Type: Gen  ETT  Intra-op Monitoring Plan: Standard ASA Monitors  Post Op Pain Control Plan: multimodal analgesia and IV/PO Opioids PRN  Induction:  IV  Airway Plan: Direct, Post-Induction  Informed Consent: Informed consent signed with the Patient and all parties understand the risks and agree with anesthesia plan.  All questions answered.   ASA Score: 3  Day of Surgery Review of History & Physical: H&P Update referred to the surgeon/provider.    Ready For Surgery From Anesthesia Perspective.     .

## 2024-01-03 NOTE — PRE-PROCEDURE INSTRUCTIONS
PRE-OP INSTRUCTIONS:  Instructed to have nothing by mouth for 8 hours before surgery.  It is ok to take AM medications with a few sips of water.  Instructed to shower the night before surgery as well as the morning of surgery with an antibacterial soap like dial or hibiclens from the neck down.  Encouraged to wear loose fitting, comfortable clothing .  Medication instructions for pm prior to and am of surgery reviewed.  Instructed to avoid taking vitamins,supplements or ibuprofen the am of surgery.    Patient denies any side effects or issues with anesthesia or sedation.

## 2024-01-04 ENCOUNTER — HOSPITAL ENCOUNTER (INPATIENT)
Facility: HOSPITAL | Age: 79
LOS: 1 days | Discharge: HOME | DRG: 357 | End: 2024-01-05
Attending: SURGERY | Admitting: SURGERY
Payer: MEDICARE

## 2024-01-04 ENCOUNTER — ANESTHESIA (OUTPATIENT)
Dept: SURGERY | Facility: HOSPITAL | Age: 79
DRG: 357 | End: 2024-01-04
Payer: MEDICARE

## 2024-01-04 DIAGNOSIS — R35.1 BENIGN PROSTATIC HYPERPLASIA WITH NOCTURIA: ICD-10-CM

## 2024-01-04 DIAGNOSIS — N40.1 BENIGN PROSTATIC HYPERPLASIA WITH NOCTURIA: ICD-10-CM

## 2024-01-04 DIAGNOSIS — K55.069 OCCLUSION OF SUPERIOR MESENTERIC ARTERY: Primary | ICD-10-CM

## 2024-01-04 DIAGNOSIS — K55.1 MESENTERIC ARTERY STENOSIS: ICD-10-CM

## 2024-01-04 LAB
POCT GLUCOSE: 180 MG/DL (ref 70–110)
POCT GLUCOSE: 187 MG/DL (ref 70–110)

## 2024-01-04 PROCEDURE — C1887 CATHETER, GUIDING: HCPCS | Performed by: SURGERY

## 2024-01-04 PROCEDURE — 63600175 PHARM REV CODE 636 W HCPCS

## 2024-01-04 PROCEDURE — 25000003 PHARM REV CODE 250: Performed by: STUDENT IN AN ORGANIZED HEALTH CARE EDUCATION/TRAINING PROGRAM

## 2024-01-04 PROCEDURE — 37000009 HC ANESTHESIA EA ADD 15 MINS: Performed by: SURGERY

## 2024-01-04 PROCEDURE — 82962 GLUCOSE BLOOD TEST: CPT | Performed by: SURGERY

## 2024-01-04 PROCEDURE — B4141ZZ FLUOROSCOPY OF SUPERIOR MESENTERIC ARTERY USING LOW OSMOLAR CONTRAST: ICD-10-PCS | Performed by: SURGERY

## 2024-01-04 PROCEDURE — 25000003 PHARM REV CODE 250

## 2024-01-04 PROCEDURE — 27201423 OPTIME MED/SURG SUP & DEVICES STERILE SUPPLY: Performed by: SURGERY

## 2024-01-04 PROCEDURE — 37000008 HC ANESTHESIA 1ST 15 MINUTES: Performed by: SURGERY

## 2024-01-04 PROCEDURE — C1760 CLOSURE DEV, VASC: HCPCS | Performed by: SURGERY

## 2024-01-04 PROCEDURE — 36000707: Performed by: SURGERY

## 2024-01-04 PROCEDURE — 36000706: Performed by: SURGERY

## 2024-01-04 PROCEDURE — 63600175 PHARM REV CODE 636 W HCPCS: Performed by: SURGERY

## 2024-01-04 PROCEDURE — 37236 OPEN/PERQ PLACE STENT 1ST: CPT | Mod: ,,, | Performed by: SURGERY

## 2024-01-04 PROCEDURE — 71000016 HC POSTOP RECOV ADDL HR: Performed by: SURGERY

## 2024-01-04 PROCEDURE — 71000015 HC POSTOP RECOV 1ST HR: Performed by: SURGERY

## 2024-01-04 PROCEDURE — 25500020 PHARM REV CODE 255: Performed by: SURGERY

## 2024-01-04 PROCEDURE — C1769 GUIDE WIRE: HCPCS | Performed by: SURGERY

## 2024-01-04 PROCEDURE — 04753DZ DILATION OF SUPERIOR MESENTERIC ARTERY WITH INTRALUMINAL DEVICE, PERCUTANEOUS APPROACH: ICD-10-PCS | Performed by: SURGERY

## 2024-01-04 PROCEDURE — 99223 1ST HOSP IP/OBS HIGH 75: CPT | Mod: 25,,, | Performed by: SURGERY

## 2024-01-04 PROCEDURE — C1894 INTRO/SHEATH, NON-LASER: HCPCS | Performed by: SURGERY

## 2024-01-04 PROCEDURE — 36245 INS CATH ABD/L-EXT ART 1ST: CPT | Mod: 51,,, | Performed by: SURGERY

## 2024-01-04 PROCEDURE — C1725 CATH, TRANSLUMIN NON-LASER: HCPCS | Performed by: SURGERY

## 2024-01-04 PROCEDURE — 20600001 HC STEP DOWN PRIVATE ROOM

## 2024-01-04 PROCEDURE — 94761 N-INVAS EAR/PLS OXIMETRY MLT: CPT

## 2024-01-04 PROCEDURE — C1874 STENT, COATED/COV W/DEL SYS: HCPCS | Performed by: SURGERY

## 2024-01-04 PROCEDURE — D9220A PRA ANESTHESIA: Mod: ,,, | Performed by: ANESTHESIOLOGY

## 2024-01-04 DEVICE — IMPLANTABLE DEVICE: Type: IMPLANTABLE DEVICE | Site: ARTERIAL | Status: FUNCTIONAL

## 2024-01-04 RX ORDER — HALOPERIDOL 5 MG/ML
0.5 INJECTION INTRAMUSCULAR EVERY 10 MIN PRN
Status: DISCONTINUED | OUTPATIENT
Start: 2024-01-04 | End: 2024-01-04 | Stop reason: HOSPADM

## 2024-01-04 RX ORDER — PROTAMINE SULFATE 10 MG/ML
INJECTION, SOLUTION INTRAVENOUS
Status: DISCONTINUED | OUTPATIENT
Start: 2024-01-04 | End: 2024-01-04

## 2024-01-04 RX ORDER — ALBUTEROL SULFATE 90 UG/1
2 AEROSOL, METERED RESPIRATORY (INHALATION) EVERY 6 HOURS PRN
Status: DISCONTINUED | OUTPATIENT
Start: 2024-01-04 | End: 2024-01-05 | Stop reason: HOSPADM

## 2024-01-04 RX ORDER — LISINOPRIL 10 MG/1
10 TABLET ORAL EVERY MORNING
Status: DISCONTINUED | OUTPATIENT
Start: 2024-01-05 | End: 2024-01-05 | Stop reason: HOSPADM

## 2024-01-04 RX ORDER — CLOPIDOGREL BISULFATE 75 MG/1
300 TABLET ORAL ONCE
Status: COMPLETED | OUTPATIENT
Start: 2024-01-04 | End: 2024-01-04

## 2024-01-04 RX ORDER — SUCCINYLCHOLINE CHLORIDE 20 MG/ML
INJECTION INTRAMUSCULAR; INTRAVENOUS
Status: DISCONTINUED | OUTPATIENT
Start: 2024-01-04 | End: 2024-01-04

## 2024-01-04 RX ORDER — AMLODIPINE BESYLATE 10 MG/1
10 TABLET ORAL EVERY MORNING
Status: DISCONTINUED | OUTPATIENT
Start: 2024-01-05 | End: 2024-01-05 | Stop reason: HOSPADM

## 2024-01-04 RX ORDER — SODIUM CHLORIDE 9 MG/ML
INJECTION, SOLUTION INTRAVENOUS CONTINUOUS
Status: ACTIVE | OUTPATIENT
Start: 2024-01-04 | End: 2024-01-05

## 2024-01-04 RX ORDER — ONDANSETRON 2 MG/ML
INJECTION INTRAMUSCULAR; INTRAVENOUS
Status: DISCONTINUED | OUTPATIENT
Start: 2024-01-04 | End: 2024-01-04

## 2024-01-04 RX ORDER — FENTANYL CITRATE 50 UG/ML
INJECTION, SOLUTION INTRAMUSCULAR; INTRAVENOUS
Status: DISCONTINUED | OUTPATIENT
Start: 2024-01-04 | End: 2024-01-04

## 2024-01-04 RX ORDER — LEVOTHYROXINE SODIUM 150 UG/1
300 TABLET ORAL EVERY MORNING
Status: DISCONTINUED | OUTPATIENT
Start: 2024-01-05 | End: 2024-01-05 | Stop reason: HOSPADM

## 2024-01-04 RX ORDER — SODIUM CHLORIDE 0.9 % (FLUSH) 0.9 %
10 SYRINGE (ML) INJECTION
Status: DISCONTINUED | OUTPATIENT
Start: 2024-01-04 | End: 2024-01-04 | Stop reason: HOSPADM

## 2024-01-04 RX ORDER — MELOXICAM 15 MG/1
15 TABLET ORAL DAILY
Status: DISCONTINUED | OUTPATIENT
Start: 2024-01-05 | End: 2024-01-05 | Stop reason: HOSPADM

## 2024-01-04 RX ORDER — ROCURONIUM BROMIDE 10 MG/ML
INJECTION, SOLUTION INTRAVENOUS
Status: DISCONTINUED | OUTPATIENT
Start: 2024-01-04 | End: 2024-01-04

## 2024-01-04 RX ORDER — ASPIRIN 81 MG/1
81 TABLET ORAL EVERY MORNING
Status: DISCONTINUED | OUTPATIENT
Start: 2024-01-05 | End: 2024-01-05 | Stop reason: HOSPADM

## 2024-01-04 RX ORDER — PANTOPRAZOLE SODIUM 40 MG/1
40 TABLET, DELAYED RELEASE ORAL DAILY
Status: DISCONTINUED | OUTPATIENT
Start: 2024-01-05 | End: 2024-01-05 | Stop reason: HOSPADM

## 2024-01-04 RX ORDER — FUROSEMIDE 20 MG/1
40 TABLET ORAL 2 TIMES DAILY
Status: DISCONTINUED | OUTPATIENT
Start: 2024-01-04 | End: 2024-01-04

## 2024-01-04 RX ORDER — HEPARIN SODIUM 1000 [USP'U]/ML
INJECTION, SOLUTION INTRAVENOUS; SUBCUTANEOUS
Status: DISCONTINUED | OUTPATIENT
Start: 2024-01-04 | End: 2024-01-04 | Stop reason: HOSPADM

## 2024-01-04 RX ORDER — SODIUM CHLORIDE 9 MG/ML
INJECTION, SOLUTION INTRAVENOUS CONTINUOUS
Status: DISCONTINUED | OUTPATIENT
Start: 2024-01-04 | End: 2024-01-04

## 2024-01-04 RX ORDER — FUROSEMIDE 40 MG/1
40 TABLET ORAL 2 TIMES DAILY
Status: DISCONTINUED | OUTPATIENT
Start: 2024-01-05 | End: 2024-01-05 | Stop reason: HOSPADM

## 2024-01-04 RX ORDER — FLUTICASONE PROPIONATE 50 MCG
2 SPRAY, SUSPENSION (ML) NASAL DAILY
Status: DISCONTINUED | OUTPATIENT
Start: 2024-01-05 | End: 2024-01-05 | Stop reason: HOSPADM

## 2024-01-04 RX ORDER — CETIRIZINE HYDROCHLORIDE 5 MG/1
5 TABLET ORAL DAILY
Status: DISCONTINUED | OUTPATIENT
Start: 2024-01-05 | End: 2024-01-05 | Stop reason: HOSPADM

## 2024-01-04 RX ORDER — FAMOTIDINE 20 MG/1
40 TABLET, FILM COATED ORAL NIGHTLY
Status: DISCONTINUED | OUTPATIENT
Start: 2024-01-04 | End: 2024-01-05 | Stop reason: HOSPADM

## 2024-01-04 RX ORDER — PROPAFENONE HYDROCHLORIDE 150 MG/1
150 TABLET, COATED ORAL EVERY 8 HOURS
Status: DISCONTINUED | OUTPATIENT
Start: 2024-01-04 | End: 2024-01-05 | Stop reason: HOSPADM

## 2024-01-04 RX ORDER — CARVEDILOL 3.12 MG/1
3.12 TABLET ORAL 2 TIMES DAILY WITH MEALS
Status: DISCONTINUED | OUTPATIENT
Start: 2024-01-04 | End: 2024-01-05 | Stop reason: HOSPADM

## 2024-01-04 RX ORDER — IODIXANOL 320 MG/ML
INJECTION, SOLUTION INTRAVASCULAR
Status: DISCONTINUED | OUTPATIENT
Start: 2024-01-04 | End: 2024-01-04 | Stop reason: HOSPADM

## 2024-01-04 RX ORDER — FINASTERIDE 5 MG/1
5 TABLET, FILM COATED ORAL NIGHTLY
Status: DISCONTINUED | OUTPATIENT
Start: 2024-01-04 | End: 2024-01-05 | Stop reason: HOSPADM

## 2024-01-04 RX ORDER — CEFAZOLIN SODIUM 1 G/3ML
INJECTION, POWDER, FOR SOLUTION INTRAMUSCULAR; INTRAVENOUS
Status: DISCONTINUED | OUTPATIENT
Start: 2024-01-04 | End: 2024-01-04

## 2024-01-04 RX ORDER — DEXAMETHASONE SODIUM PHOSPHATE 4 MG/ML
INJECTION, SOLUTION INTRA-ARTICULAR; INTRALESIONAL; INTRAMUSCULAR; INTRAVENOUS; SOFT TISSUE
Status: DISCONTINUED | OUTPATIENT
Start: 2024-01-04 | End: 2024-01-04

## 2024-01-04 RX ORDER — IPRATROPIUM BROMIDE 42 UG/1
1 SPRAY, METERED NASAL 2 TIMES DAILY
Status: DISCONTINUED | OUTPATIENT
Start: 2024-01-04 | End: 2024-01-05 | Stop reason: HOSPADM

## 2024-01-04 RX ORDER — FLUTICASONE FUROATE AND VILANTEROL 100; 25 UG/1; UG/1
1 POWDER RESPIRATORY (INHALATION) DAILY
Status: DISCONTINUED | OUTPATIENT
Start: 2024-01-05 | End: 2024-01-05 | Stop reason: HOSPADM

## 2024-01-04 RX ORDER — HEPARIN SODIUM 1000 [USP'U]/ML
INJECTION, SOLUTION INTRAVENOUS; SUBCUTANEOUS
Status: DISCONTINUED | OUTPATIENT
Start: 2024-01-04 | End: 2024-01-04

## 2024-01-04 RX ORDER — ACETAMINOPHEN 325 MG/1
650 TABLET ORAL EVERY 6 HOURS PRN
Status: DISCONTINUED | OUTPATIENT
Start: 2024-01-04 | End: 2024-01-05 | Stop reason: HOSPADM

## 2024-01-04 RX ORDER — CLOPIDOGREL BISULFATE 75 MG/1
75 TABLET ORAL DAILY
Status: DISCONTINUED | OUTPATIENT
Start: 2024-01-05 | End: 2024-01-05 | Stop reason: HOSPADM

## 2024-01-04 RX ORDER — HYDROMORPHONE HYDROCHLORIDE 1 MG/ML
0.2 INJECTION, SOLUTION INTRAMUSCULAR; INTRAVENOUS; SUBCUTANEOUS EVERY 5 MIN PRN
Status: DISCONTINUED | OUTPATIENT
Start: 2024-01-04 | End: 2024-01-04 | Stop reason: HOSPADM

## 2024-01-04 RX ORDER — MONTELUKAST SODIUM 10 MG/1
10 TABLET ORAL NIGHTLY
Status: DISCONTINUED | OUTPATIENT
Start: 2024-01-04 | End: 2024-01-05 | Stop reason: HOSPADM

## 2024-01-04 RX ORDER — LIDOCAINE HYDROCHLORIDE 20 MG/ML
INJECTION, SOLUTION EPIDURAL; INFILTRATION; INTRACAUDAL; PERINEURAL
Status: DISCONTINUED | OUTPATIENT
Start: 2024-01-04 | End: 2024-01-04

## 2024-01-04 RX ORDER — PROPOFOL 10 MG/ML
VIAL (ML) INTRAVENOUS
Status: DISCONTINUED | OUTPATIENT
Start: 2024-01-04 | End: 2024-01-04

## 2024-01-04 RX ADMIN — LIDOCAINE HYDROCHLORIDE 80 MG: 20 INJECTION, SOLUTION EPIDURAL; INFILTRATION; INTRACAUDAL at 12:01

## 2024-01-04 RX ADMIN — MONTELUKAST 10 MG: 10 TABLET, FILM COATED ORAL at 08:01

## 2024-01-04 RX ADMIN — SUGAMMADEX 200 MG: 100 INJECTION, SOLUTION INTRAVENOUS at 02:01

## 2024-01-04 RX ADMIN — ACETAMINOPHEN 650 MG: 325 TABLET ORAL at 05:01

## 2024-01-04 RX ADMIN — PROPOFOL 170 MG: 10 INJECTION, EMULSION INTRAVENOUS at 12:01

## 2024-01-04 RX ADMIN — PROPOFOL 50 MG: 10 INJECTION, EMULSION INTRAVENOUS at 02:01

## 2024-01-04 RX ADMIN — PROTAMINE SULFATE 60 MG: 10 INJECTION, SOLUTION INTRAVENOUS at 02:01

## 2024-01-04 RX ADMIN — FENTANYL CITRATE 100 MCG: 50 INJECTION INTRAMUSCULAR; INTRAVENOUS at 12:01

## 2024-01-04 RX ADMIN — DEXAMETHASONE SODIUM PHOSPHATE 4 MG: 4 INJECTION INTRA-ARTICULAR; INTRALESIONAL; INTRAMUSCULAR; INTRAVENOUS; SOFT TISSUE at 01:01

## 2024-01-04 RX ADMIN — PROPOFOL 30 MG: 10 INJECTION, EMULSION INTRAVENOUS at 02:01

## 2024-01-04 RX ADMIN — ROCURONIUM BROMIDE 30 MG: 10 INJECTION INTRAVENOUS at 01:01

## 2024-01-04 RX ADMIN — PROPOFOL 30 MG: 10 INJECTION, EMULSION INTRAVENOUS at 01:01

## 2024-01-04 RX ADMIN — SUCCINYLCHOLINE CHLORIDE 100 MG: 20 INJECTION, SOLUTION INTRAMUSCULAR; INTRAVENOUS; PARENTERAL at 12:01

## 2024-01-04 RX ADMIN — FINASTERIDE 5 MG: 5 TABLET, FILM COATED ORAL at 08:01

## 2024-01-04 RX ADMIN — SODIUM CHLORIDE: 9 INJECTION, SOLUTION INTRAVENOUS at 03:01

## 2024-01-04 RX ADMIN — ONDANSETRON 4 MG: 2 INJECTION INTRAMUSCULAR; INTRAVENOUS at 02:01

## 2024-01-04 RX ADMIN — SODIUM CHLORIDE, SODIUM GLUCONATE, SODIUM ACETATE, POTASSIUM CHLORIDE, MAGNESIUM CHLORIDE, SODIUM PHOSPHATE, DIBASIC, AND POTASSIUM PHOSPHATE: .53; .5; .37; .037; .03; .012; .00082 INJECTION, SOLUTION INTRAVENOUS at 02:01

## 2024-01-04 RX ADMIN — ROCURONIUM BROMIDE 20 MG: 10 INJECTION INTRAVENOUS at 01:01

## 2024-01-04 RX ADMIN — FAMOTIDINE 40 MG: 20 TABLET ORAL at 08:01

## 2024-01-04 RX ADMIN — CEFAZOLIN 2 G: 330 INJECTION, POWDER, FOR SOLUTION INTRAMUSCULAR; INTRAVENOUS at 01:01

## 2024-01-04 RX ADMIN — HEPARIN SODIUM 7000 UNITS: 1000 INJECTION, SOLUTION INTRAVENOUS; SUBCUTANEOUS at 01:01

## 2024-01-04 RX ADMIN — SODIUM CHLORIDE: 0.9 INJECTION, SOLUTION INTRAVENOUS at 12:01

## 2024-01-04 RX ADMIN — PROPAFENONE HYDROCHLORIDE 150 MG: 150 TABLET, FILM COATED ORAL at 10:01

## 2024-01-04 RX ADMIN — SODIUM CHLORIDE: 9 INJECTION, SOLUTION INTRAVENOUS at 10:01

## 2024-01-04 RX ADMIN — CLOPIDOGREL BISULFATE 300 MG: 75 TABLET ORAL at 07:01

## 2024-01-04 RX ADMIN — ROCURONIUM BROMIDE 5 MG: 10 INJECTION INTRAVENOUS at 12:01

## 2024-01-04 RX ADMIN — CARVEDILOL 3.12 MG: 3.12 TABLET, FILM COATED ORAL at 04:01

## 2024-01-04 NOTE — BRIEF OP NOTE
[facility] - [facility] (2nd Fl)  Brief Operative Note    Surgery Date: 1/4/2024     Surgeon(s) and Role:     * ALEX Alfred III, MD - Primary     * Francisco Mercedes MD - Fellow    Assisting Surgeon: None    Pre-op Diagnosis:  SMA occlusion    Post-op Diagnosis:   same    PROCEDURE:    Covered stent placement, SMA (6x29 VBX)  Selective SMA angiogram  US guided L CFA access    Anesthesia: General    Operative Findings: 7 fr L CFA/mynx closure; 13.2 min fluoro; 2698 mGy;     SMA occlusion; successful  recanalization with no residual stenosis    Estimated Blood Loss: <5cc         Specimens:   Specimen (24h ago, onward)      None              Discharge Note    OUTCOME: successful outpatient procedure     DISPOSITION: home    FINAL DIAGNOSIS:  SMA occlusion    FOLLOWUP: 2 weeks with mesenteric duplex    DISCHARGE INSTRUCTIONS:  Plavix/ASA, see below     Procedure rescheduled as requested to 8/23. No further assistance needed at this time.

## 2024-01-04 NOTE — TRANSFER OF CARE
Anesthesia Transfer of Care Note    Patient: Erendira Pretty    Procedure(s) Performed: Procedure(s) (LRB):  ANGIOGRAM, EXTREMITY, UNILATERAL- Femoral / SMS Stent, Poss General (Left)  STENT, SUPERIOR MESENTERIC ARTERY (Left)    Patient location: PACU    Anesthesia Type: general    Transport from OR: Transported from OR on room air with adequate spontaneous ventilation    Post pain: adequate analgesia    Post assessment: no apparent anesthetic complications and tolerated procedure well    Post vital signs: stable    Level of consciousness: awake, alert and oriented    Nausea/Vomiting: no nausea/vomiting    Complications: none    Transfer of care protocol was followed    Last vitals: Visit Vitals  BP (!) 162/77 (BP Location: Left arm, Patient Position: Lying)   Pulse 90   Temp 36.7 °C (98.1 °F) (Temporal)   Resp 20   SpO2 97%

## 2024-01-04 NOTE — INTERVAL H&P NOTE
The patient has been examined and the H&P has been reviewed:    I concur with the findings and no changes have occurred since H&P was written.    Anesthesia/Surgery risks, benefits and alternative options discussed and understood by patient/family.    Last took Xarelto Sunday, continued on ASA.       There are no hospital problems to display for this patient.

## 2024-01-04 NOTE — ANESTHESIA PROCEDURE NOTES
Intubation    Date/Time: 1/4/2024 12:59 PM    Performed by: Brittani Magallon MD  Authorized by: Larry Campbell MD    Intubation:     Induction:  Rapid sequence induction    Intubated:  Postinduction    Mask Ventilation:  Not attempted    Attempts:  1    Attempted By:  Resident anesthesiologist    Method of Intubation:  Direct    Blade:  Beauchamp 2    Laryngeal View Grade: Grade IIA - cords partially seen      Difficult Airway Encountered?: No      Complications:  None    Airway Device:  Oral endotracheal tube    Airway Device Size:  7.5    Style/Cuff Inflation:  Cuffed (inflated to minimal occlusive pressure)    Tube secured:  22    Secured at:  The teeth    Placement Verified By:  Capnometry    Complicating Factors:  None    Findings Post-Intubation:  BS equal bilateral and atraumatic/condition of teeth unchanged

## 2024-01-05 VITALS
DIASTOLIC BLOOD PRESSURE: 67 MMHG | HEART RATE: 81 BPM | TEMPERATURE: 98 F | BODY MASS INDEX: 33.91 KG/M2 | WEIGHT: 216.06 LBS | OXYGEN SATURATION: 94 % | RESPIRATION RATE: 18 BRPM | SYSTOLIC BLOOD PRESSURE: 120 MMHG | HEIGHT: 67 IN

## 2024-01-05 PROBLEM — K55.069 OCCLUSION OF SUPERIOR MESENTERIC ARTERY: Status: ACTIVE | Noted: 2024-01-05

## 2024-01-05 LAB
ANION GAP SERPL CALC-SCNC: 6 MMOL/L (ref 8–16)
BASOPHILS # BLD AUTO: 0.01 K/UL (ref 0–0.2)
BASOPHILS # BLD AUTO: 0.01 K/UL (ref 0–0.2)
BASOPHILS NFR BLD: 0.1 % (ref 0–1.9)
BASOPHILS NFR BLD: 0.1 % (ref 0–1.9)
BUN SERPL-MCNC: 13 MG/DL (ref 8–23)
CALCIUM SERPL-MCNC: 7.3 MG/DL (ref 8.7–10.5)
CHLORIDE SERPL-SCNC: 115 MMOL/L (ref 95–110)
CO2 SERPL-SCNC: 21 MMOL/L (ref 23–29)
CREAT SERPL-MCNC: 0.8 MG/DL (ref 0.5–1.4)
DIFFERENTIAL METHOD BLD: ABNORMAL
DIFFERENTIAL METHOD BLD: ABNORMAL
EOSINOPHIL # BLD AUTO: 0 K/UL (ref 0–0.5)
EOSINOPHIL # BLD AUTO: 0 K/UL (ref 0–0.5)
EOSINOPHIL NFR BLD: 0 % (ref 0–8)
EOSINOPHIL NFR BLD: 0 % (ref 0–8)
ERYTHROCYTE [DISTWIDTH] IN BLOOD BY AUTOMATED COUNT: 13.8 % (ref 11.5–14.5)
ERYTHROCYTE [DISTWIDTH] IN BLOOD BY AUTOMATED COUNT: 13.9 % (ref 11.5–14.5)
EST. GFR  (NO RACE VARIABLE): >60 ML/MIN/1.73 M^2
GLUCOSE SERPL-MCNC: 111 MG/DL (ref 70–110)
HCT VFR BLD AUTO: 30.3 % (ref 40–54)
HCT VFR BLD AUTO: 35.1 % (ref 40–54)
HGB BLD-MCNC: 11.4 G/DL (ref 14–18)
HGB BLD-MCNC: 9.9 G/DL (ref 14–18)
IMM GRANULOCYTES # BLD AUTO: 0.03 K/UL (ref 0–0.04)
IMM GRANULOCYTES # BLD AUTO: 0.04 K/UL (ref 0–0.04)
IMM GRANULOCYTES NFR BLD AUTO: 0.4 % (ref 0–0.5)
IMM GRANULOCYTES NFR BLD AUTO: 0.4 % (ref 0–0.5)
LYMPHOCYTES # BLD AUTO: 0.9 K/UL (ref 1–4.8)
LYMPHOCYTES # BLD AUTO: 1.3 K/UL (ref 1–4.8)
LYMPHOCYTES NFR BLD: 12 % (ref 18–48)
LYMPHOCYTES NFR BLD: 13.7 % (ref 18–48)
MAGNESIUM SERPL-MCNC: 1.7 MG/DL (ref 1.6–2.6)
MCH RBC QN AUTO: 29.5 PG (ref 27–31)
MCH RBC QN AUTO: 29.7 PG (ref 27–31)
MCHC RBC AUTO-ENTMCNC: 32.5 G/DL (ref 32–36)
MCHC RBC AUTO-ENTMCNC: 32.7 G/DL (ref 32–36)
MCV RBC AUTO: 91 FL (ref 82–98)
MCV RBC AUTO: 91 FL (ref 82–98)
MONOCYTES # BLD AUTO: 0.5 K/UL (ref 0.3–1)
MONOCYTES # BLD AUTO: 0.6 K/UL (ref 0.3–1)
MONOCYTES NFR BLD: 4.9 % (ref 4–15)
MONOCYTES NFR BLD: 7.3 % (ref 4–15)
NEUTROPHILS # BLD AUTO: 6.1 K/UL (ref 1.8–7.7)
NEUTROPHILS # BLD AUTO: 7.7 K/UL (ref 1.8–7.7)
NEUTROPHILS NFR BLD: 80.2 % (ref 38–73)
NEUTROPHILS NFR BLD: 80.9 % (ref 38–73)
NRBC BLD-RTO: 0 /100 WBC
NRBC BLD-RTO: 0 /100 WBC
PHOSPHATE SERPL-MCNC: 3 MG/DL (ref 2.7–4.5)
PLATELET # BLD AUTO: 141 K/UL (ref 150–450)
PLATELET # BLD AUTO: 178 K/UL (ref 150–450)
PMV BLD AUTO: 10.3 FL (ref 9.2–12.9)
PMV BLD AUTO: 9.9 FL (ref 9.2–12.9)
POTASSIUM SERPL-SCNC: 3.8 MMOL/L (ref 3.5–5.1)
RBC # BLD AUTO: 3.33 M/UL (ref 4.6–6.2)
RBC # BLD AUTO: 3.86 M/UL (ref 4.6–6.2)
SODIUM SERPL-SCNC: 142 MMOL/L (ref 136–145)
WBC # BLD AUTO: 7.65 K/UL (ref 3.9–12.7)
WBC # BLD AUTO: 9.52 K/UL (ref 3.9–12.7)

## 2024-01-05 PROCEDURE — 36415 COLL VENOUS BLD VENIPUNCTURE: CPT | Performed by: STUDENT IN AN ORGANIZED HEALTH CARE EDUCATION/TRAINING PROGRAM

## 2024-01-05 PROCEDURE — 80048 BASIC METABOLIC PNL TOTAL CA: CPT | Performed by: STUDENT IN AN ORGANIZED HEALTH CARE EDUCATION/TRAINING PROGRAM

## 2024-01-05 PROCEDURE — 85025 COMPLETE CBC W/AUTO DIFF WBC: CPT | Performed by: STUDENT IN AN ORGANIZED HEALTH CARE EDUCATION/TRAINING PROGRAM

## 2024-01-05 PROCEDURE — 85025 COMPLETE CBC W/AUTO DIFF WBC: CPT | Mod: 91

## 2024-01-05 PROCEDURE — 99238 HOSP IP/OBS DSCHRG MGMT 30/<: CPT | Mod: ,,, | Performed by: SURGERY

## 2024-01-05 PROCEDURE — 63600175 PHARM REV CODE 636 W HCPCS

## 2024-01-05 PROCEDURE — 36415 COLL VENOUS BLD VENIPUNCTURE: CPT | Mod: XB

## 2024-01-05 PROCEDURE — 94640 AIRWAY INHALATION TREATMENT: CPT

## 2024-01-05 PROCEDURE — 83735 ASSAY OF MAGNESIUM: CPT | Performed by: STUDENT IN AN ORGANIZED HEALTH CARE EDUCATION/TRAINING PROGRAM

## 2024-01-05 PROCEDURE — 94761 N-INVAS EAR/PLS OXIMETRY MLT: CPT

## 2024-01-05 PROCEDURE — 25000003 PHARM REV CODE 250: Performed by: STUDENT IN AN ORGANIZED HEALTH CARE EDUCATION/TRAINING PROGRAM

## 2024-01-05 PROCEDURE — 84100 ASSAY OF PHOSPHORUS: CPT | Performed by: STUDENT IN AN ORGANIZED HEALTH CARE EDUCATION/TRAINING PROGRAM

## 2024-01-05 RX ORDER — ACETAMINOPHEN 325 MG/1
650 TABLET ORAL EVERY 8 HOURS PRN
Qty: 30 TABLET | Refills: 0 | Status: SHIPPED | OUTPATIENT
Start: 2024-01-05 | End: 2024-01-25

## 2024-01-05 RX ORDER — CLOPIDOGREL BISULFATE 75 MG/1
75 TABLET ORAL DAILY
Qty: 30 TABLET | Refills: 3 | Status: ON HOLD | OUTPATIENT
Start: 2024-01-06 | End: 2024-01-17 | Stop reason: HOSPADM

## 2024-01-05 RX ORDER — DEXAMETHASONE SODIUM PHOSPHATE 4 MG/ML
4 INJECTION, SOLUTION INTRA-ARTICULAR; INTRALESIONAL; INTRAMUSCULAR; INTRAVENOUS; SOFT TISSUE ONCE
Status: COMPLETED | OUTPATIENT
Start: 2024-01-05 | End: 2024-01-05

## 2024-01-05 RX ADMIN — LEVOTHYROXINE SODIUM 300 MCG: 150 TABLET ORAL at 06:01

## 2024-01-05 RX ADMIN — CETIRIZINE HYDROCHLORIDE 5 MG: 5 TABLET, FILM COATED ORAL at 08:01

## 2024-01-05 RX ADMIN — FUROSEMIDE 40 MG: 40 TABLET ORAL at 08:01

## 2024-01-05 RX ADMIN — MELOXICAM 15 MG: 15 TABLET ORAL at 09:01

## 2024-01-05 RX ADMIN — CARVEDILOL 3.12 MG: 3.12 TABLET, FILM COATED ORAL at 08:01

## 2024-01-05 RX ADMIN — CLOPIDOGREL BISULFATE 75 MG: 75 TABLET ORAL at 08:01

## 2024-01-05 RX ADMIN — DEXAMETHASONE SODIUM PHOSPHATE 4 MG: 4 INJECTION INTRA-ARTICULAR; INTRALESIONAL; INTRAMUSCULAR; INTRAVENOUS; SOFT TISSUE at 08:01

## 2024-01-05 RX ADMIN — AMLODIPINE BESYLATE 10 MG: 10 TABLET ORAL at 06:01

## 2024-01-05 RX ADMIN — ASPIRIN 81 MG: 81 TABLET, COATED ORAL at 06:01

## 2024-01-05 RX ADMIN — PROPAFENONE HYDROCHLORIDE 150 MG: 150 TABLET, FILM COATED ORAL at 06:01

## 2024-01-05 RX ADMIN — FLUTICASONE FUROATE AND VILANTEROL 1 PUFF: 100; 25 POWDER RESPIRATORY (INHALATION) at 08:01

## 2024-01-05 RX ADMIN — LISINOPRIL 10 MG: 10 TABLET ORAL at 06:01

## 2024-01-05 RX ADMIN — PANTOPRAZOLE SODIUM 40 MG: 40 TABLET, DELAYED RELEASE ORAL at 08:01

## 2024-01-05 NOTE — NURSING TRANSFER
Nursing Transfer Note      1/4/2024   8:53 PM    Nurse giving handoff:ed rn  Nurse receiving handoff:GISSU RN    Reason patient is being transferred: recovery care complete    Transfer To: 1012    Transfer via bed        Transported by recovery care complete          4eyes on Skin: yes    Medicines sent: NS @ 75 ML/HR    Any special needs or follow-up needed: groin site assessment    Patient belongings transferred with patient: Yes    Chart send with patient: Yes    Notified: son    Patient reassessed at: 01/04/24

## 2024-01-05 NOTE — PROGRESS NOTES
[facility] [place]  Vascular Surgery  Progress Note    Patient Name: Erendira Pretty  MRN: [id number]  Admission Date: 1/4/2024  Primary Care Provider: Bhupinder Callaway MD    Subjective:     Interval History: Patient well-appearing, denying pain and abdominal tenderness, no abdominal distension noted.  Groin site c/d/I, soft on palpation.  Pending results of CTA a/p, CT scan called and stated they would take him shortly, RN to give dexamethasone prior to scan.      Post-Op Info:  Procedure(s) (LRB):  ANGIOGRAM, EXTREMITY, UNILATERAL- Femoral / SMS Stent, Poss General (Left)  STENT, SUPERIOR MESENTERIC ARTERY (Left)   1 Day Post-Op     Medications:  Continuous Infusions:  Scheduled Meds:   amLODIPine  10 mg Oral QAM    aspirin  81 mg Oral QAM    carvediloL  3.125 mg Oral BID WM    cetirizine  5 mg Oral Daily    clopidogreL  75 mg Oral Daily    dexAMETHasone  4 mg Intravenous Once    famotidine  40 mg Oral QHS    finasteride  5 mg Oral QHS    fluticasone furoate-vilanteroL  1 puff Inhalation Daily    fluticasone propionate  2 spray Each Nostril Daily    furosemide  40 mg Oral BID    ipratropium  1 spray Each Nostril BID    levothyroxine  300 mcg Oral QAM    lisinopriL  10 mg Oral QAM    meloxicam  15 mg Oral Daily    montelukast  10 mg Oral QHS    pantoprazole  40 mg Oral Daily    propafenone  150 mg Oral Q8H     PRN Meds:acetaminophen, albuterol     Objective:     Vital Signs (Most Recent):  Temp: 98.2 °F (36.8 °C) (01/05/24 0736)  Pulse: 81 (01/05/24 0736)  Resp: 20 (01/05/24 0736)  BP: 137/67 (01/05/24 0736)  SpO2: (!) 94 % (01/05/24 0736) Vital Signs (24h Range):  Temp:  [98 °F (36.7 °C)-98.8 °F (37.1 °C)] 98.2 °F (36.8 °C)  Pulse:  [] 81  Resp:  [10-26] 20  SpO2:  [94 %-99 %] 94 %  BP: (128-167)/(64-92) 137/67          Physical Exam  HENT:      Head: Normocephalic.      Mouth/Throat:      Mouth: Mucous membranes are moist.   Eyes:      Pupils: Pupils are equal, round, and reactive to light.   Cardiovascular:       Rate and Rhythm: Normal rate.      Comments: L PT Biphasic  Abdominal:      General: There is no distension.      Palpations: Abdomen is soft.      Tenderness: There is no abdominal tenderness. There is no guarding.   Musculoskeletal:         General: Normal range of motion.      Cervical back: Normal range of motion.   Skin:     General: Skin is warm and dry.      Capillary Refill: Capillary refill takes less than 2 seconds.      Comments: Left groin percutaneous access site soft on palpation    Neurological:      Mental Status: He is alert and oriented to person, place, and time.   Psychiatric:         Mood and Affect: Mood normal.         Behavior: Behavior normal.          Significant Labs:  CBC:   Recent Labs   Lab 01/05/24  0610   WBC 7.65   RBC 3.33*   HGB 9.9*   HCT 30.3*   *   MCV 91   MCH 29.7   MCHC 32.7     CMP:   Recent Labs   Lab 01/05/24  0610   *   CALCIUM 7.3*      K 3.8   CO2 21*   *   BUN 13   CREATININE 0.8       Significant Diagnostics:  I have reviewed and interpreted all pertinent imaging results/findings within the past 24 hours.  Assessment/Plan:     * Occlusion of superior mesenteric artery  Mr. Pretty is a 78-year old male with pmh of SMA occlusion including 1-2 month history of severe postprandial upper abdominal/ epigastric pain with diarrhea and weight loss.  Recent colonoscopy consistent with ischemia.  SMA occlusion noted as well as high-grade celiac artery stenosis.  He is now s/p covered stent placement to the SMA via L CFA access.      - Monitor L groin percutaneous site  - H&H this morning 9 & 30, down from 14 & 43 pre-op        -STAT CTA a/p ordered this am to rule out retroperitoneal hemorrhage        - dexamethasone 4 mg ordered to give prior to contrast administration (same med & dose given yesterday in OR, communicated with RN)  - aspirin and plavix   - NPO until results from CTA            Jackie Obregon NP  Vascular Surgery  [facility]  [facility]

## 2024-01-05 NOTE — NURSING
Pt rolled to floor in bed by PACU nurse. A/Ox4 with IVF infusing. No distress noted at this time. Pt ok to ambulate at 2115. Ambulated independently. No pain at this time. Gauze to L groin intact. No bleeding or swelling noted.

## 2024-01-05 NOTE — HOSPITAL COURSE
For details of hospital stay, please refer to daily progress notes. Briefly, this is a 78 y.o. male presented on 1/4 for planned surgical intervention for chronic SMA occlusion. Patient was taken to OR and underwent SMA stenting. Post-operatively patient was admitted to the floor and had an uncomplicated hospital recovery.    On the day of discharge, the patient was ambulating without difficulty, voiding spontaneously, was tolerating a diet without nausea or vomiting, and pain was well controlled on PO pain medications. Discharge instructions were explained to the patient and appropriate follow-up was arranged.

## 2024-01-05 NOTE — ASSESSMENT & PLAN NOTE
Mr. Pretty is a 78-year old male with pmh of SMA occlusion including 1-2 month history of severe postprandial upper abdominal/ epigastric pain with diarrhea and weight loss.  Recent colonoscopy consistent with ischemia.  SMA occlusion noted as well as high-grade celiac artery stenosis.  He is now s/p covered stent placement to the SMA via L CFA access.      - Monitor L groin percutaneous site  - H&H this morning 9 & 30, down from 14 & 43 pre-op        -STAT CTA a/p ordered this am to rule out retroperitoneal hemorrhage        - dexamethasone 4 mg ordered to give prior to contrast administration (same med & dose given yesterday in OR, communicated with RN)  - aspirin and plavix   - NPO until results from CTA

## 2024-01-05 NOTE — DISCHARGE SUMMARY
[facility]  Vascular Surgery  Discharge Summary      Patient Name: Erendira Pretty  MRN: [id number]  Admission Date: 1/4/2024  Hospital Length of Stay: 1 days  Discharge Date and Time:  01/05/2024 9:55 AM  Attending Physician: ALEX Alfred III, MD   Discharging Provider: Jackie Obregon NP  Primary Care Provider: Bhupinder Callaway MD    HPI:   No notes on file    Procedure(s) (LRB):  ANGIOGRAM, EXTREMITY, UNILATERAL- Femoral / SMS Stent, Poss General (Left)  STENT, SUPERIOR MESENTERIC ARTERY (Left)     Hospital Course: For details of hospital stay, please refer to daily progress notes. Briefly, this is a 78 y.o. male presented on 1/4 for planned surgical intervention for chronic SMA occlusion. Patient was taken to OR and underwent SMA stenting. Post-operatively patient was admitted to the floor and had an uncomplicated hospital recovery.    On the day of discharge, the patient was ambulating without difficulty, voiding spontaneously, was tolerating a diet without nausea or vomiting, and pain was well controlled on PO pain medications. Discharge instructions were explained to the patient and appropriate follow-up was arranged.     Goals of Care Treatment Preferences:  Code Status: Full Code    Living Will: Yes     Significant Diagnostic Studies: N/A    Pending Diagnostic Studies:       None          Final Active Diagnoses:    Diagnosis Date Noted POA    PRINCIPAL PROBLEM:  Occlusion of superior mesenteric artery [K55.069] 01/05/2024 Unknown      Problems Resolved During this Admission:      Discharged Condition: good    Disposition: Home or Self Care    Follow Up:   Follow-up Information       ALEX Alfred III, MD Follow up in 2 week(s).    Specialty: Vascular Surgery  Contact information:  [address]  [facility] [address]  [phone]                             Patient Instructions:      Diet Cardiac     Notify your health care provider if you experience any of the following:     Notify your  health care provider if you experience any of the following:  increased confusion or weakness     Notify your health care provider if you experience any of the following:  persistent dizziness, light-headedness, or visual disturbances     Notify your health care provider if you experience any of the following:  worsening rash     Notify your health care provider if you experience any of the following:  severe persistent headache     Notify your health care provider if you experience any of the following:  difficulty breathing or increased cough     Notify your health care provider if you experience any of the following:  redness, tenderness, or signs of infection (pain, swelling, redness, odor or green/yellow discharge around incision site)     Notify your health care provider if you experience any of the following:  severe uncontrolled pain     Notify your health care provider if you experience any of the following:  persistent nausea and vomiting or diarrhea     Notify your health care provider if you experience any of the following:  temperature >100.4     No dressing needed     Activity as tolerated     Medications:  Reconciled Home Medications:      Medication List        START taking these medications      acetaminophen 325 MG tablet  Commonly known as: TYLENOL  Take 2 tablets (650 mg total) by mouth every 8 (eight) hours as needed for Pain.     clopidogreL 75 mg tablet  Commonly known as: PLAVIX  Take 1 tablet (75 mg total) by mouth once daily.  Start taking on: January 6, 2024            CHANGE how you take these medications      amLODIPine 10 MG tablet  Commonly known as: NORVASC  TAKE 1 TABLET BY MOUTH ONCE A DAY (DOSE INCREASE)  What changed: See the new instructions.     empagliflozin 25 mg tablet  Commonly known as: JARDIANCE  Take 1 tablet (25 mg total) by mouth once daily.  What changed: when to take this     lisinopriL 10 MG tablet  TAKE 1 BY MOUTH EVERY DAY  What changed: when to take this      montelukast 10 mg tablet  Commonly known as: SINGULAIR  Take 1 tablet (10 mg total) by mouth once daily.  What changed: when to take this            CONTINUE taking these medications      ACCU[place]CHEK [place] [facility] [place]  Generic drug: blood-glucose meter  AS DIRECTED     albuterol 90 mcg/actuation inhaler  Commonly known as: VENTOLIN HFA  Inhale 2 puffs into the lungs every 6 (six) hours as needed for Wheezing or Shortness of Breath. Rescue     aspirin 81 MG EC tablet  Commonly known as: ECOTRIN  Take 81 mg by mouth every morning.     azelastine 137 mcg (0.1 %) nasal spray  Commonly known as: ASTELIN  2 sprays (274 mcg total) by Nasal route 2 (two) times daily.     blood sugar diagnostic Strp  Commonly known as: ACCU-CHEK GRACE PLUS TEST STRP  TEST THREE TIMES A DAY     budesonide-formoterol 160-4.5 mcg 160-4.5 mcg/actuation Hfaa  Commonly known as: SYMBICORT  INHALE 2 PUFFS INTO THE LUNGS TWO TIMES A DAY.     carvediloL 3.125 MG tablet  Commonly known as: COREG  Take 1 tablet (3.125 mg total) by mouth 2 (two) times daily with meals.     diphenhydrAMINE 50 MG capsule  Commonly known as: BENADRYL  Take 50mg by mouth 1 hour before contrast administration.     famotidine 40 MG tablet  Commonly known as: PEPCID  TAKE 1 TABLET BY MOUTH EVERY EVENING     finasteride 5 mg tablet  Commonly known as: PROSCAR  TAKE 1 TABLET BY MOUTH once daily     fluticasone propionate 50 mcg/actuation nasal spray  Commonly known as: FLONASE  2 sprays (100 mcg total) by Each Nostril route once daily.     furosemide 40 MG tablet  Commonly known as: LASIX  Take 1 tablet (40 mg total) by mouth 2 (two) times daily.     insulin glargine 100 units/mL SubQ pen  Commonly known as: LANTUS SOLOSTAR U-100 INSULIN  Inject 44 Units into the skin every evening.     ipratropium 21 mcg (0.03 %) nasal spray  Commonly known as: ATROVENT  2 sprays by Each Nostril route 2 (two) times daily.     krill oil 500 mg Cap  Take by mouth.     lancets Misc  Commonly  "known as: ACCU-CHEK FASTCLIX LANCET DRUM  TEST TWO TIMES A DAY     levocetirizine 5 MG tablet  Commonly known as: XYZAL  Take 1 tablet (5 mg total) by mouth every evening.     levothyroxine 300 MCG Tab  Commonly known as: SYNTHROID  Take 1 tablet (300 mcg total) by mouth every morning.     meloxicam 15 MG tablet  Commonly known as: MOBIC  Take 15 mg by mouth.     metFORMIN 500 MG ER 24hr tablet  Commonly known as: GLUCOPHAGE-XR  Take 1 tablet (500 mg total) by mouth 2 (two) times daily with meals.     OSTEO BI-FLEX ORAL  Take 1 tablet by mouth 2 (two) times daily.     pen needle, diabetic 31 gauge x 3/16" Ndle  Commonly known as: BD ULTRA-FINE MINI PEN NEEDLE  USE AS DIRECTED     predniSONE 50 MG Tab  Commonly known as: DELTASONE  Take 50mg by mouth at 13 hours, 7 hours, and 1 hour before contrast administration.     propafenone 150 MG Tab  Commonly known as: RHTHYMOL  Take 1 tablet (150 mg total) by mouth every 8 (eight) hours.     RABEprazole 20 mg tablet  Commonly known as: ACIPHEX  Take 1 tablet (20 mg total) by mouth 2 (two) times daily.     REPATHA SURECLICK 140 mg/mL Pnij  Generic drug: evolocumab  SMARTSI Milligram(s) Topical Every 2 Weeks     rivaroxaban 20 mg Tab  Commonly known as: XARELTO  Take 1 tablet (20 mg total) by mouth daily with dinner or evening meal.     sildenafiL 100 MG tablet  Commonly known as: VIAGRA  Take 1/2 to 1 tablet by mouth one hour prior to intercourse. Max 100mg per day              Jackie Obregon NP  Vascular Surgery  [facility]  "

## 2024-01-05 NOTE — OP NOTE
OPERATIVE REPORT    Patient: Erendira Pretty  Surgery Date: 1/4/2024      Surgeon(s) and Role:     * ALEX Alfred III, MD - Primary     * Francisco Mercedes MD - Fellow     Assisting Surgeon: None     Pre-op Diagnosis:  SMA occlusion     Post-op Diagnosis:   same     PROCEDURE:     Covered stent placement, SMA (6x29 VBX)  Selective SMA angiogram  US guided L CFA access     Anesthesia: General          Estimated Blood Loss: <5cc    Indications for Procedure:  Erendira Pretty is a 78 y.o. male who presented with chronic mesenteric ischemia, with symptoms of pain after eating, diarrhea, found on CT performed to have stenosis of his celiac and superior mesenteric artery both greater than 70%.  Given his symptoms, he was offered an mesenteric angiogram with possible intervention. All risks, benefits, and alternatives were discussed and the patient understood and wished to proceed and gave written consent.     Operative Findings: 7 fr L CFA/mynx closure; 13.2 min fluoro; 2698 mGy;      SMA occlusion; successful  recanalization with no residual stenosis    Operative Details:   The patient was brought to the operating room and placed in the operating room table in supine position.  He underwent general anesthesia with endotracheal tube intubation.  All pressure points were appropriately padded.  Both arms were placed over the patient's head to reduce radiation exposure.  The bilateral groins were prepped and draped in the usual sterile fashion.  A surgical time-out was performed.    Under ultrasound guidance, the left common femoral artery was accessed with a micropuncture needle, followed by a Mandril wire and 4 Liberian sheath.  Sheathogram demonstrated access in the common femoral artery.  An 035 stiff angled glidewire was then placed into the aorta. A small skin nick was made.  The 4 Liberian sheath was then exchanged for a short 5 Liberian sheath.  A flush pigtail catheter was then placed in the aorta and an aortogram performed  in the true lateral position.  It demonstrated prominent stenosis of the celiac, what appeared to be an occlusion of the SMA with retrograde filling, however there was a proximal stump filling..  Following this, the pigtail and 5 Haitian sheath were then exchanged for a 7 Haitian steerable sheath.  The steerable sheath was parked right by the SMA which was marked.  Initially, a stiff angled glidewire was used to attempt to enter the SMA and was unable to cross the lesion, a short dissection was noted.  Next, a straight 035 stiff glidewire was then used, and was able to recannulate the lesion.  Over this wire a an 035 seeker catheter was placed, and an SMA arteriogram performed which demonstrated re-entry into the SMA.  The stiff glidewire was then exchanged for a Pagan wire.  Over this wire, the SMA was pre-dilated with a 4 x 20 mm mustang balloon.  There was significant waste, however the SMA did open well.  Following this, a 6 by 29 mm VBX was placed with the proximal end just into the aorta and the distal end prior to the 1st SMA branch.  Follow-up arteriogram demonstrated resolution of the stenosis.    The steerable sheath was then removed and exchanged for short 7 Haitian sheath.  The wire was removed.  A 6/7 Haitian Mynx was deployed under fluoroscopic guidance without issues.  Pressure was held for 30 minutes.  There was no evidence of a groin hematoma afterwards.  The patient was then transferred to the postanesthesia care recovery unit in stable condition.      Dr. Alfred was present and scrubbed for all aspects of the case.    All instrument and sponge counts were confirmed correct. The family was notified of the results of the surgery afterwards.

## 2024-01-05 NOTE — DISCHARGE INSTRUCTIONS
VASCULAR SURGERY DISCHARGE INSTRUCTIONS    Woundcare:  - Shower daily and pad your incision site dry  - Leave sutures in place  - It is normal to notice some bruising at your incision site in the first 1-3 days after surgery    Activity:  - Activity is good for you. Try to walk frequently and increase walking distance every day  - No heavy lifting for 2 weeks  - No baths, swimming in pools, lakes, jacuzzi etc. for 2 weeks     Diet:  -Resume your pre-operative home diet    Follow up:  -Follow up for ultrasound and vascular surgery clinic appointment in ~2 weeks    Call Vascular Surgery Office at [phone] if you experience:  -Increased redness, warmth, tenderness, or draining pus from your incision  -Increased pain/swelling at your incision  -Worsening fevers, chills, nausea/vomiting  -Pain, weakness, coldness, or numbness in your legs  -Uncontrolled pain  -Your call will be returned within 24 hours and further instructions will be provided    Go to ER/Urgent Care if you experience:  -Worsening shortness of breath or chest pain  -Sudden severe pain and swelling at your incision site

## 2024-01-05 NOTE — SUBJECTIVE & OBJECTIVE
Medications:  Continuous Infusions:  Scheduled Meds:   amLODIPine  10 mg Oral QAM    aspirin  81 mg Oral QAM    carvediloL  3.125 mg Oral BID WM    cetirizine  5 mg Oral Daily    clopidogreL  75 mg Oral Daily    dexAMETHasone  4 mg Intravenous Once    famotidine  40 mg Oral QHS    finasteride  5 mg Oral QHS    fluticasone furoate-vilanteroL  1 puff Inhalation Daily    fluticasone propionate  2 spray Each Nostril Daily    furosemide  40 mg Oral BID    ipratropium  1 spray Each Nostril BID    levothyroxine  300 mcg Oral QAM    lisinopriL  10 mg Oral QAM    meloxicam  15 mg Oral Daily    montelukast  10 mg Oral QHS    pantoprazole  40 mg Oral Daily    propafenone  150 mg Oral Q8H     PRN Meds:acetaminophen, albuterol     Objective:     Vital Signs (Most Recent):  Temp: 98.2 °F (36.8 °C) (01/05/24 0736)  Pulse: 81 (01/05/24 0736)  Resp: 20 (01/05/24 0736)  BP: 137/67 (01/05/24 0736)  SpO2: (!) 94 % (01/05/24 0736) Vital Signs (24h Range):  Temp:  [98 °F (36.7 °C)-98.8 °F (37.1 °C)] 98.2 °F (36.8 °C)  Pulse:  [] 81  Resp:  [10-26] 20  SpO2:  [94 %-99 %] 94 %  BP: (128-167)/(64-92) 137/67          Physical Exam  HENT:      Head: Normocephalic.      Mouth/Throat:      Mouth: Mucous membranes are moist.   Eyes:      Pupils: Pupils are equal, round, and reactive to light.   Cardiovascular:      Rate and Rhythm: Normal rate.      Comments: L PT Biphasic  Abdominal:      General: There is no distension.      Palpations: Abdomen is soft.      Tenderness: There is no abdominal tenderness. There is no guarding.   Musculoskeletal:         General: Normal range of motion.      Cervical back: Normal range of motion.   Skin:     General: Skin is warm and dry.      Capillary Refill: Capillary refill takes less than 2 seconds.      Comments: Left groin percutaneous access site soft on palpation    Neurological:      Mental Status: He is alert and oriented to person, place, and time.   Psychiatric:         Mood and Affect: Mood  normal.         Behavior: Behavior normal.          Significant Labs:  CBC:   Recent Labs   Lab 01/05/24  0610   WBC 7.65   RBC 3.33*   HGB 9.9*   HCT 30.3*   *   MCV 91   MCH 29.7   MCHC 32.7     CMP:   Recent Labs   Lab 01/05/24  0610   *   CALCIUM 7.3*      K 3.8   CO2 21*   *   BUN 13   CREATININE 0.8       Significant Diagnostics:  I have reviewed and interpreted all pertinent imaging results/findings within the past 24 hours.

## 2024-01-05 NOTE — PLAN OF CARE
[facility] Plan of Care Note  Dx Angiogram with stent placement to L groin     Shift Events Pt admitted to the floor this shift. Admission complete.      Goals of Care: Goals of care ongoing and progressing.      Neuro: A/O x4     Vital Signs: VSS     Respiratory: RA     Diet: Regular     Is patient tolerating current diet? Yes     GTTS: NaCl 75ml/hr     Urine Output/Bowel Movement: Adequate.      Drains/Tubes/Tube Feeds (include total output/shift): NA     Lines: 18g R forearm     Accuchecks:NA     Skin: L groin site with gauze and tegaderm     Fall Risk Score: Low     Activity level? Ambulates     Any scheduled procedures? NA     Any safety concerns? NA    Problem: Adult Inpatient Plan of Care  Goal: Plan of Care Review  Outcome: Ongoing, Progressing  Goal: Patient-Specific Goal (Individualized)  Outcome: Ongoing, Progressing  Goal: Absence of Hospital-Acquired Illness or Injury  Outcome: Ongoing, Progressing  Goal: Optimal Comfort and Wellbeing  Outcome: Ongoing, Progressing  Goal: Readiness for Transition of Care  Outcome: Ongoing, Progressing     Problem: Diabetes Comorbidity  Goal: Blood Glucose Level Within Targeted Range  Outcome: Ongoing, Progressing     Problem: Fall Injury Risk  Goal: Absence of Fall and Fall-Related Injury  Outcome: Ongoing, Progressing

## 2024-01-06 NOTE — PLAN OF CARE
Inels alecia [facility]  Discharge Final Note    Primary Care Provider: Bhupinder Callaway MD  Expected Discharge Date: 1/5/2024    Patient medically ready for discharge to home.     Transportation by family.     Is family/patient aware of discharge: yes     Hospital follow up scheduled: yes     Patient left before I was able to complete admission assessment.       Final Discharge Note (most recent)       Final Note - 01/05/24 2231          Final Note    Assessment Type Final Discharge Note     Anticipated Discharge Disposition Home or Self Care     Hospital Resources/Appts/Education Provided Provided patient/caregiver with written discharge plan information                     Important Message from Medicare         Referral Info (most recent)       Referral Info    No documentation.                 Contact Info       ALEX Alfred III, MD   Specialty: Vascular Surgery    [address] [address]   Phone: [phone]       Next Steps: Follow up in 2 week(s)          Future Appointments   Date Time Provider Department Center   2/28/2024  8:45 AM LABORATORY, [facility]   2/28/2024 10:00 AM [facility] MRI [facility]   3/7/2024  8:00 AM Frank Gallardo MD [facility] CARDIO [place]   3/7/2024  1:30 PM Anna Wood MD [facility] HEPAT [facility]   4/9/2024  7:20 AM LABORATORY, [place] [facility] [place]   4/16/2024  8:40 AM Bhupinder Callaway MD [facility] IM [facility]   5/10/2024  8:15 AM [facility] XR2 [facility] XRAY [facility]   5/10/2024  8:30 AM PULMONARY LAB, [place] [facility] PULMFUN [facility]   5/10/2024  9:00 AM PULMONARY LAB 2, [facility]   5/10/2024  9:20 AM Rishi Molina MD [facility] PULMSVC [facility]     Magen Sims RN  Case Management  Ext: [phone]  01/05/2024

## 2024-01-08 ENCOUNTER — TELEPHONE (OUTPATIENT)
Dept: VASCULAR SURGERY | Facility: CLINIC | Age: 79
End: 2024-01-08
Payer: MEDICARE

## 2024-01-08 DIAGNOSIS — K55.1 MESENTERIC ARTERY STENOSIS: Primary | ICD-10-CM

## 2024-01-08 NOTE — ANESTHESIA POSTPROCEDURE EVALUATION
Anesthesia Post Evaluation    Patient: Erendira Pretty    Procedure(s) Performed: Procedure(s) (LRB):  ANGIOGRAM, EXTREMITY, UNILATERAL- Femoral / SMS Stent, Poss General (Left)  STENT, SUPERIOR MESENTERIC ARTERY (Left)    Final Anesthesia Type: general      Patient location during evaluation: PACU  Patient participation: Yes- Able to Participate  Level of consciousness: awake and alert and oriented  Post-procedure vital signs: reviewed and stable  Pain management: adequate  Airway patency: patent    PONV status at discharge: No PONV  Anesthetic complications: no      Cardiovascular status: hemodynamically stable  Respiratory status: unassisted, spontaneous ventilation and room air  Hydration status: euvolemic  Follow-up not needed.          Vital signs stable, no issues.      Event Time   Out of Recovery 15:10:00         Pain/Natalie Score: No data recorded

## 2024-01-09 ENCOUNTER — TELEPHONE (OUTPATIENT)
Dept: VASCULAR SURGERY | Facility: CLINIC | Age: 79
End: 2024-01-09
Payer: MEDICARE

## 2024-01-09 DIAGNOSIS — R80.9 TYPE 2 DIABETES MELLITUS WITH MICROALBUMINURIA, WITH LONG-TERM CURRENT USE OF INSULIN: Chronic | ICD-10-CM

## 2024-01-09 DIAGNOSIS — E11.29 TYPE 2 DIABETES MELLITUS WITH MICROALBUMINURIA, WITH LONG-TERM CURRENT USE OF INSULIN: Chronic | ICD-10-CM

## 2024-01-09 DIAGNOSIS — Z79.4 TYPE 2 DIABETES MELLITUS WITH MICROALBUMINURIA, WITH LONG-TERM CURRENT USE OF INSULIN: Chronic | ICD-10-CM

## 2024-01-09 RX ORDER — PEN NEEDLE, DIABETIC 30 GX3/16"
NEEDLE, DISPOSABLE MISCELLANEOUS
Qty: 100 EACH | Refills: 11 | Status: SHIPPED | OUTPATIENT
Start: 2024-01-09

## 2024-01-09 NOTE — TELEPHONE ENCOUNTER
----- Message from Anna Marie sent at 1/9/2024 11:22 AM CST -----  Contact: krystina  Patient is requesting a refill on BD ultra mini pen needles. Please call him back at [phone].      [place], [place] - [address] [facility] [address] [address] [address] [address]  Phone: [phone] Fax: [phone]           Thanks  DD

## 2024-01-09 NOTE — TELEPHONE ENCOUNTER
Care Due:                  Date            Visit Type   Department     Provider  --------------------------------------------------------------------------------                                EP -                              PRIMARY      [facility] INTERNAL  Last Visit: 11-      CARE ([facility])   [facility]       Bhupinder Callaway                              EP -                              PRIMARY      [facility] INTERNAL  Next Visit: 04-      CARE ([facility])   [facility]                                                            Last  Test          Frequency    Reason                     Performed    Due Date  --------------------------------------------------------------------------------    HBA1C.......  6 months...  empagliflozin, insulin...  10-   04-    [facility] Embedded Care Due Messages. Reference number: [id number].   1/09/2024 11:28:47 AM CST

## 2024-01-09 NOTE — TELEPHONE ENCOUNTER
Attempted to contact patient in response to message. Voice message left for patient requesting return call.   ----- Message from Sary Aly sent at 1/9/2024  9:03 AM CST -----  Regarding: Advice  on dentist appt  Contact: pt @ [phone]  Pt states he needs to get a tooth abstracted from his mouth and would like to know if he should wait since the stint was recently put in. Please c/b to advise...Thanks

## 2024-01-10 ENCOUNTER — TELEPHONE (OUTPATIENT)
Dept: VASCULAR SURGERY | Facility: CLINIC | Age: 79
End: 2024-01-10
Payer: MEDICARE

## 2024-01-10 NOTE — TELEPHONE ENCOUNTER
Attempted to contact patient in response to message. Voice message left for patient requesting return call.   ----- Message from Stacie Ortiz sent at 1/10/2024  9:17 AM CST -----  Regarding: returning call  Contact: [phone]  Who Called:Erendira    Who Left Message for Patient:Tigist    Does the patient know what this is regarding?:tooth abstraction    Would the patient rather a call back or a response via [facility]? Call back    Best Call Back Number:[phone]    Additional Information:

## 2024-01-10 NOTE — TELEPHONE ENCOUNTER
Pt returning nurse's call. States he broke a tooth the day after recent procedure with Dr. Alfred and would like to know if he can proceed with tooth extraction now or if he should wait a specific amount of time before extraction. Nurse instructed pt to have form for clearance for dental procedure sent to clinic fax for Dr. Alfred to review. Pt verbalized understanding. Will await arrival of clearance form.

## 2024-01-16 ENCOUNTER — TELEPHONE (OUTPATIENT)
Dept: VASCULAR SURGERY | Facility: CLINIC | Age: 79
End: 2024-01-16
Payer: MEDICARE

## 2024-01-16 ENCOUNTER — HOSPITAL ENCOUNTER (OUTPATIENT)
Facility: HOSPITAL | Age: 79
Discharge: HOME | End: 2024-01-17
Attending: EMERGENCY MEDICINE | Admitting: FAMILY MEDICINE
Payer: MEDICARE

## 2024-01-16 DIAGNOSIS — K92.1 MELENA: Primary | ICD-10-CM

## 2024-01-16 DIAGNOSIS — Z79.01 CHRONIC ANTICOAGULATION: ICD-10-CM

## 2024-01-16 DIAGNOSIS — R07.9 CHEST PAIN: ICD-10-CM

## 2024-01-16 DIAGNOSIS — D64.9 ACUTE ON CHRONIC ANEMIA: ICD-10-CM

## 2024-01-16 LAB
ABO + RH BLD: NORMAL
ALBUMIN SERPL BCP-MCNC: 3.3 G/DL (ref 3.5–5.2)
ALP SERPL-CCNC: 83 U/L (ref 55–135)
ALT SERPL W/O P-5'-P-CCNC: 26 U/L (ref 10–44)
ANION GAP SERPL CALC-SCNC: 7 MMOL/L (ref 8–16)
AST SERPL-CCNC: 31 U/L (ref 10–40)
BASOPHILS # BLD AUTO: 0.04 K/UL (ref 0–0.2)
BASOPHILS NFR BLD: 0.8 % (ref 0–1.9)
BILIRUB SERPL-MCNC: 0.2 MG/DL (ref 0.1–1)
BLD GP AB SCN CELLS X3 SERPL QL: NORMAL
BUN SERPL-MCNC: 17 MG/DL (ref 8–23)
CALCIUM SERPL-MCNC: 8.7 MG/DL (ref 8.7–10.5)
CHLORIDE SERPL-SCNC: 105 MMOL/L (ref 95–110)
CO2 SERPL-SCNC: 27 MMOL/L (ref 23–29)
CREAT SERPL-MCNC: 1 MG/DL (ref 0.5–1.4)
DIFFERENTIAL METHOD BLD: ABNORMAL
EOSINOPHIL # BLD AUTO: 0.1 K/UL (ref 0–0.5)
EOSINOPHIL NFR BLD: 2.2 % (ref 0–8)
ERYTHROCYTE [DISTWIDTH] IN BLOOD BY AUTOMATED COUNT: 14.2 % (ref 11.5–14.5)
EST. GFR  (NO RACE VARIABLE): >60 ML/MIN/1.73 M^2
GLUCOSE SERPL-MCNC: 142 MG/DL (ref 70–110)
HCT VFR BLD AUTO: 30.1 % (ref 40–54)
HCT VFR BLD AUTO: 31.8 % (ref 40–54)
HGB BLD-MCNC: 10 G/DL (ref 14–18)
HGB BLD-MCNC: 10.5 G/DL (ref 14–18)
IMM GRANULOCYTES # BLD AUTO: 0.03 K/UL (ref 0–0.04)
IMM GRANULOCYTES NFR BLD AUTO: 0.6 % (ref 0–0.5)
INR PPP: 1.1 (ref 0.8–1.2)
LYMPHOCYTES # BLD AUTO: 1.1 K/UL (ref 1–4.8)
LYMPHOCYTES NFR BLD: 21.1 % (ref 18–48)
MCH RBC QN AUTO: 29.2 PG (ref 27–31)
MCHC RBC AUTO-ENTMCNC: 33.2 G/DL (ref 32–36)
MCV RBC AUTO: 88 FL (ref 82–98)
MONOCYTES # BLD AUTO: 0.5 K/UL (ref 0.3–1)
MONOCYTES NFR BLD: 9.8 % (ref 4–15)
NEUTROPHILS # BLD AUTO: 3.3 K/UL (ref 1.8–7.7)
NEUTROPHILS NFR BLD: 65.5 % (ref 38–73)
NRBC BLD-RTO: 0 /100 WBC
OB PNL STL: POSITIVE
PLATELET # BLD AUTO: 215 K/UL (ref 150–450)
PMV BLD AUTO: 9.9 FL (ref 9.2–12.9)
POCT GLUCOSE: 104 MG/DL (ref 70–110)
POTASSIUM SERPL-SCNC: 4.2 MMOL/L (ref 3.5–5.1)
PROT SERPL-MCNC: 7.1 G/DL (ref 6–8.4)
PROTHROMBIN TIME: 11.3 SEC (ref 9–12.5)
RBC # BLD AUTO: 3.42 M/UL (ref 4.6–6.2)
SODIUM SERPL-SCNC: 139 MMOL/L (ref 136–145)
SPECIMEN OUTDATE: NORMAL
WBC # BLD AUTO: 5.02 K/UL (ref 3.9–12.7)

## 2024-01-16 PROCEDURE — 99285 EMERGENCY DEPT VISIT HI MDM: CPT | Mod: 25

## 2024-01-16 PROCEDURE — 86850 RBC ANTIBODY SCREEN: CPT | Performed by: NURSE PRACTITIONER

## 2024-01-16 PROCEDURE — 99215 OFFICE O/P EST HI 40 MIN: CPT | Mod: FS,,, | Performed by: INTERNAL MEDICINE

## 2024-01-16 PROCEDURE — G0378 HOSPITAL OBSERVATION PER HR: HCPCS

## 2024-01-16 PROCEDURE — 25000003 PHARM REV CODE 250: Performed by: NURSE PRACTITIONER

## 2024-01-16 PROCEDURE — 96374 THER/PROPH/DIAG INJ IV PUSH: CPT

## 2024-01-16 PROCEDURE — 96376 TX/PRO/DX INJ SAME DRUG ADON: CPT

## 2024-01-16 PROCEDURE — 82272 OCCULT BLD FECES 1-3 TESTS: CPT | Performed by: NURSE PRACTITIONER

## 2024-01-16 PROCEDURE — 85610 PROTHROMBIN TIME: CPT | Performed by: NURSE PRACTITIONER

## 2024-01-16 PROCEDURE — 96372 THER/PROPH/DIAG INJ SC/IM: CPT | Mod: 59 | Performed by: NURSE PRACTITIONER

## 2024-01-16 PROCEDURE — 85025 COMPLETE CBC W/AUTO DIFF WBC: CPT | Performed by: NURSE PRACTITIONER

## 2024-01-16 PROCEDURE — 85014 HEMATOCRIT: CPT | Mod: 91 | Performed by: FAMILY MEDICINE

## 2024-01-16 PROCEDURE — 85018 HEMOGLOBIN: CPT | Performed by: FAMILY MEDICINE

## 2024-01-16 PROCEDURE — 80053 COMPREHEN METABOLIC PANEL: CPT | Performed by: NURSE PRACTITIONER

## 2024-01-16 PROCEDURE — 82962 GLUCOSE BLOOD TEST: CPT

## 2024-01-16 RX ORDER — GLUCAGON 1 MG
1 KIT INJECTION
Status: DISCONTINUED | OUTPATIENT
Start: 2024-01-16 | End: 2024-01-17 | Stop reason: HOSPADM

## 2024-01-16 RX ORDER — IBUPROFEN 200 MG
24 TABLET ORAL
Status: DISCONTINUED | OUTPATIENT
Start: 2024-01-16 | End: 2024-01-17 | Stop reason: HOSPADM

## 2024-01-16 RX ORDER — FAMOTIDINE 10 MG/ML
20 INJECTION INTRAVENOUS 2 TIMES DAILY
Status: DISCONTINUED | OUTPATIENT
Start: 2024-01-16 | End: 2024-01-17 | Stop reason: HOSPADM

## 2024-01-16 RX ORDER — AMLODIPINE BESYLATE 10 MG/1
10 TABLET ORAL DAILY
Status: DISCONTINUED | OUTPATIENT
Start: 2024-01-16 | End: 2024-01-17 | Stop reason: HOSPADM

## 2024-01-16 RX ORDER — ACETAMINOPHEN 325 MG/1
650 TABLET ORAL EVERY 4 HOURS PRN
Status: DISCONTINUED | OUTPATIENT
Start: 2024-01-16 | End: 2024-01-17 | Stop reason: HOSPADM

## 2024-01-16 RX ORDER — FUROSEMIDE 40 MG/1
40 TABLET ORAL 2 TIMES DAILY
Status: DISCONTINUED | OUTPATIENT
Start: 2024-01-16 | End: 2024-01-17 | Stop reason: HOSPADM

## 2024-01-16 RX ORDER — TALC
6 POWDER (GRAM) TOPICAL NIGHTLY PRN
Status: DISCONTINUED | OUTPATIENT
Start: 2024-01-16 | End: 2024-01-17 | Stop reason: HOSPADM

## 2024-01-16 RX ORDER — LEVOTHYROXINE SODIUM 75 UG/1
300 TABLET ORAL
Status: DISCONTINUED | OUTPATIENT
Start: 2024-01-17 | End: 2024-01-17

## 2024-01-16 RX ORDER — FINASTERIDE 5 MG/1
5 TABLET, FILM COATED ORAL NIGHTLY
Status: DISCONTINUED | OUTPATIENT
Start: 2024-01-16 | End: 2024-01-17 | Stop reason: HOSPADM

## 2024-01-16 RX ORDER — SODIUM CHLORIDE 0.9 % (FLUSH) 0.9 %
10 SYRINGE (ML) INJECTION EVERY 12 HOURS PRN
Status: DISCONTINUED | OUTPATIENT
Start: 2024-01-16 | End: 2024-01-17 | Stop reason: HOSPADM

## 2024-01-16 RX ORDER — IBUPROFEN 200 MG
16 TABLET ORAL
Status: DISCONTINUED | OUTPATIENT
Start: 2024-01-16 | End: 2024-01-17 | Stop reason: HOSPADM

## 2024-01-16 RX ORDER — PROPAFENONE HYDROCHLORIDE 150 MG/1
150 TABLET, COATED ORAL EVERY 8 HOURS
Status: DISCONTINUED | OUTPATIENT
Start: 2024-01-16 | End: 2024-01-17 | Stop reason: HOSPADM

## 2024-01-16 RX ORDER — CARVEDILOL 3.12 MG/1
3.12 TABLET ORAL 2 TIMES DAILY WITH MEALS
Status: DISCONTINUED | OUTPATIENT
Start: 2024-01-16 | End: 2024-01-17 | Stop reason: HOSPADM

## 2024-01-16 RX ORDER — INSULIN ASPART 100 [IU]/ML
0-5 INJECTION, SOLUTION INTRAVENOUS; SUBCUTANEOUS
Status: DISCONTINUED | OUTPATIENT
Start: 2024-01-16 | End: 2024-01-17 | Stop reason: HOSPADM

## 2024-01-16 RX ORDER — NALOXONE HCL 0.4 MG/ML
0.02 VIAL (ML) INJECTION
Status: DISCONTINUED | OUTPATIENT
Start: 2024-01-16 | End: 2024-01-17 | Stop reason: HOSPADM

## 2024-01-16 RX ADMIN — FAMOTIDINE 20 MG: 10 INJECTION, SOLUTION INTRAVENOUS at 09:01

## 2024-01-16 RX ADMIN — INSULIN DETEMIR 20 UNITS: 100 INJECTION, SOLUTION SUBCUTANEOUS at 09:01

## 2024-01-16 RX ADMIN — PROPAFENONE HYDROCHLORIDE 150 MG: 150 TABLET, FILM COATED ORAL at 02:01

## 2024-01-16 RX ADMIN — CARVEDILOL 3.12 MG: 3.12 TABLET, FILM COATED ORAL at 06:01

## 2024-01-16 RX ADMIN — AMLODIPINE BESYLATE 10 MG: 5 TABLET ORAL at 02:01

## 2024-01-16 RX ADMIN — FAMOTIDINE 20 MG: 10 INJECTION, SOLUTION INTRAVENOUS at 02:01

## 2024-01-16 RX ADMIN — FINASTERIDE 5 MG: 5 TABLET, FILM COATED ORAL at 09:01

## 2024-01-16 NOTE — ED PROVIDER NOTES
SCRIBE #1 NOTE: I, Pradip Jarocho, am scribing for, and in the presence of, Ledy Davies MD. I have scribed the entire note.      History      Chief Complaint   Patient presents with    Melena     Pt. Reports that since he has started taking blood thinners 2 weeks ago his stool has turned black.        Review of patient's allergies indicates:   Allergen Reactions    Lipitor [atorvastatin] Other (See Comments)     Muscle aches and pains as well as fatigue.     Niacin preparations Other (See Comments)     Causes broken blood vessels and bruises at 4 times normal dose.    Statins-hmg-coa reductase inhibitors Other (See Comments)     Statins cause intolerable myalgias.    Iodinated contrast media Hives     Tachycardia    Omeprazole Hives and Itching    Prilosec [omeprazole magnesium] Hives and Itching        HPI   HPI    1/16/2024, 12:19 PM   History obtained from the patient      History of Present Illness: Erendira Pretty is a 78 y.o. male patient with a PMHx of CAD, HTN, DM2 who presents to the Emergency Department for blood in stool, onset 1 week PTA. Pt states that his stool has gotten progressively darker over the past week, and had noticed black stool this morning. Pt is on Xarelto, but had also been started on Plavix and 81 mg ASA following an SMS stent placement on 1/4/24. Symptoms are episodic and moderate in severity. No mitigating or exacerbating factors reported. No associated sxs reported. Patient denies any fever, chills, n/v/d, abdominal pain, SOB, CP, weakness, numbness, dizziness, headache, and all other sxs at this time. Pt notes that he takes his Xarelto at night and Plavix in the morning. No further complaints or concerns at this time.     Arrival mode: Personal vehicle    PCP: Bhupinder Callaway MD       Past Medical History:  Past Medical History:   Diagnosis Date    Aortic stenosis     Asthma     Benign prostatic hyperplasia with nocturia     Bilateral carotid artery stenosis 7/21/2021      CAROTID BILATERAL 07/14/2021 IMPRESSION: Atherosclerosis with suspected stenosis greater than 50% at the right proximal ICA visually. Velocities are discordant and appear improved from prior. Atherosclerosis on the left with no evidence of flow-limiting stenosis greater than 50%.  FINDINGS: Right: Internal Carotid Artery (ICA): Peak systolic velocity 118 cm/sec. End diastolic velocity 35 cm/sec    BPH (benign prostatic hyperplasia)     CAD (coronary artery disease)     40% lesion 2002;violet    Cirrhosis     Claudication 4/9/2014    Colon polyp     COPD (chronic obstructive pulmonary disease)     ED (erectile dysfunction)     Encounter for blood transfusion     Ex-smoker     Hearing loss NEC     Hepatitis C     Cured;reji brown    Hyperlipidemia     Hypertension     Hypothyroid     s/p tx graves    Open angle with borderline findings and low glaucoma risk in both eyes 9/22/2020    DELONTE on CPAP     Osteoarthritis     PVD (peripheral vascular disease)     Secondary esophageal varices without bleeding 6/26/2017    Type 2 diabetes mellitus        Past Surgical History:  Past Surgical History:   Procedure Laterality Date    ANGIOGRAM, EXTREMITY, UNILATERAL Left 1/4/2024    Procedure: ANGIOGRAM, EXTREMITY, UNILATERAL- Femoral / SMS Stent, Poss General;  Surgeon: ALEX Alfred III, MD;  Location: [facility] OR [address] [place] [address];  Service: Vascular;  Laterality: Left;  mGy : 2698.78  Gy.cm : 229.88  Contrast : 62ml  Fluro time : 13.8min    CARDIAC CATHETERIZATION      CARDIAC SURGERY      CARPAL TUNNEL RELEASE Left     CATARACT EXTRACTION      CATARACT EXTRACTION W/ INTRAOCULAR LENS  IMPLANT, BILATERAL  2008    CATHETERIZATION OF BOTH LEFT AND RIGHT HEART N/A 11/2/2018    Procedure: CATHETERIZATION, HEART, BOTH LEFT AND RIGHT;  Surgeon: Frank Gallardo MD;  Location: [facility] CATH LAB;  Service: Cardiology;  Laterality: N/A;    COLONOSCOPY  8/2013    COLONOSCOPY N/A 5/30/2016    Procedure: COLONOSCOPY;  Surgeon: Anna Tomas,  MD;  Location: [facility] ENDO;  Service: Endoscopy;  Laterality: N/A;    COLONOSCOPY N/A 7/17/2019    Procedure: COLONOSCOPY;  Surgeon: David Carter III, MD;  Location: [facility] ENDO;  Service: Endoscopy;  Laterality: N/A;    COLONOSCOPY N/A 12/14/2023    Procedure: COLONOSCOPY;  Surgeon: Ama Barrera MD;  Location: [facility] ENDO;  Service: Endoscopy;  Laterality: N/A;    COMPUTED TOMOGRAPHY N/A 9/9/2019    Procedure: CT (COMPUTED TOMOGRAPHY);  Surgeon: [facility];  Location: [facility] OR;  Service: General;  Laterality: N/A;  Microwave ablation to be done in CT.  Need CRNA and cart    COMPUTED TOMOGRAPHY N/A 1/25/2022    Procedure: Liver Microwave Ablation;  Surgeon: Red Puentes MD;  Location: [facility];  Service: General;  Laterality: N/A;    CORONARY ARTERY BYPASS GRAFT (CABG) N/A 11/5/2018    Procedure: CORONARY ARTERY BYPASS GRAFT (CABG);  Surgeon: Bear De Luna MD;  Location: [facility] OR;  Service: Cardiothoracic;  Laterality: N/A;  TWO VESSEL BYPASS WITH AORTOTOMY AND EXCISION OF ATHEROSCLEROTIC PLAQUE    CORONARY BYPASS GRAFT ANGIOGRAPHY  3/2/2020    Procedure: Bypass graft study;  Surgeon: Cora Cormier MD;  Location: [facility] CATH LAB;  Service: Cardiology;;    ELBOW BURSA SURGERY Right 2010    ENDOSCOPIC HARVEST OF VEIN Left 11/5/2018    Procedure: SURGICAL PROCUREMENT, VEIN, ENDOSCOPIC;  Surgeon: Bear De Luna MD;  Location: [facility];  Service: Cardiothoracic;  Laterality: Left;    ESOPHAGOGASTRODUODENOSCOPY N/A 7/17/2019    Procedure: ESOPHAGOGASTRODUODENOSCOPY (EGD);  Surgeon: David Carter III, MD;  Location: [facility] ENDO;  Service: Endoscopy;  Laterality: N/A;    ESOPHAGOGASTRODUODENOSCOPY N/A 12/29/2022    Procedure: ESOPHAGOGASTRODUODENOSCOPY (EGD);  Surgeon: Issa Gayle MD;  Location: [facility] ENDO;  Service: Endoscopy;  Laterality: N/A;    ETHMOIDECTOMY Bilateral 3/27/2019    Procedure: ETHMOIDECTOMY;  Surgeon: Alejandro Parkinson MD;  Location: [facility] OR;  Service: ENT;  Laterality:  Bilateral;    EYE SURGERY      FOOT SURGERY      FRONTAL SINUS OBLITERATION Bilateral 3/27/2019    Procedure: SINUSOTOMY, FRONTAL SINUS, OBLITERATIVE;  Surgeon: Alejandro Parkinson MD;  Location: [facility] OR;  Service: ENT;  Laterality: Bilateral;    FUNCTIONAL ENDOSCOPIC SINUS SURGERY (FESS) Bilateral 3/27/2019    Procedure: FESS (FUNCTIONAL ENDOSCOPIC SINUS SURGERY);  Surgeon: Alejandro Parkinson MD;  Location: [facility] OR;  Service: ENT;  Laterality: Bilateral;  Left Keila bullosa resection     KNEE ARTHROSCOPY W/ MENISCAL REPAIR Left 06/2019    dr boykin    KNEE SURGERY Right     LEFT HEART CATHETERIZATION Left 3/2/2020    Procedure: CATHETERIZATION, HEART, LEFT;  Surgeon: Cora Cormier MD;  Location: [facility] CATH LAB;  Service: Cardiology;  Laterality: Left;    LIVER BIOPSY  01/2022    MAXILLARY ANTROSTOMY Bilateral 3/27/2019    Procedure: MAXILLARY ANTROSTOMY;  Surgeon: Alejandro Parkinson MD;  Location: [facility] OR;  Service: ENT;  Laterality: Bilateral;    NASAL SEPTOPLASTY N/A 3/27/2019    Procedure: SEPTOPLASTY, NOSE;  Surgeon: Alejandro Parkinson MD;  Location: [facility] OR;  Service: ENT;  Laterality: N/A;    RECTAL SURGERY  2012    STENT, SUPERIOR MESENTERIC ARTERY Left 1/4/2024    Procedure: STENT, SUPERIOR MESENTERIC ARTERY;  Surgeon: ALEX Alfred III, MD;  Location: [address];  Service: Vascular;  Laterality: Left;    TRANSURETHRAL RESECTION OF PROSTATE (TURP) WITHOUT USE OF LASER N/A 6/19/2018    Procedure: TURP, WITHOUT USING LASER;  Surgeon: Cooper Cordova IV, MD;  Location: [facility];  Service: Urology;  Laterality: N/A;    TRIGGER FINGER RELEASE Left          Family History:  Family History   Problem Relation Age of Onset    Heart disease Mother     Heart disease Father     Heart disease Brother     Colon cancer Neg Hx        Social History:  Social History     Tobacco Use    Smoking status: Former     Current packs/day: 0.00     Average packs/day: 0.5 packs/day for 4.0 years (2.0 ttl pk-yrs)     Types: Cigarettes     Start date:  1968     Quit date: 1972     Years since quittin.6    Smokeless tobacco: Former     Types: Chew     Quit date: 1976   Substance and Sexual Activity    Alcohol use: Yes     Alcohol/week: 2.0 standard drinks of alcohol     Types: 2 Cans of beer per week    Drug use: No    Sexual activity: Yes     Partners: Female       ROS   Review of Systems   Constitutional:  Negative for chills and fever.   HENT:  Negative for sore throat.    Respiratory:  Negative for shortness of breath.    Cardiovascular:  Negative for chest pain.   Gastrointestinal:  Positive for blood in stool. Negative for abdominal pain, diarrhea, nausea and vomiting.   Genitourinary:  Negative for dysuria.   Musculoskeletal:  Negative for back pain.   Skin:  Negative for rash.   Neurological:  Negative for dizziness, weakness, numbness and headaches.   Hematological:  Does not bruise/bleed easily.   All other systems reviewed and are negative.    Physical Exam      Initial Vitals [24 1109]   BP Pulse Resp Temp SpO2   (!) 174/80 77 18 98.3 °F (36.8 °C) 100 %      MAP       --          Physical Exam  Nursing Notes and Vital Signs Reviewed.  Constitutional: Patient is in no acute distress. Well-developed and well-nourished.  Head: Atraumatic. Normocephalic.  Eyes: PERRL. EOM intact. Conjunctivae are not pale. No scleral icterus.  ENT: Mucous membranes are moist. Oropharynx is clear and symmetric.    Neck: Supple. Full ROM.   Cardiovascular: Regular rate. Regular rhythm. No murmurs, rubs, or gallops. Distal pulses are 2+ and symmetric.  Pulmonary/Chest: No respiratory distress. Clear to auscultation bilaterally. No wheezing or rales.  Abdominal: Soft and non-distended.  There is no tenderness.  No rebound, guarding, or rigidity.   Musculoskeletal: Moves all extremities. No obvious deformities. No edema.  Skin: Warm and dry.  Neurological:  Alert, awake, and appropriate.  Normal speech.  No acute focal neurological deficits are  appreciated.  Psychiatric: Normal affect. Good eye contact. Appropriate in content.    ED Course    Procedures  ED Vital Signs:  Vitals:    01/16/24 1109 01/16/24 1221 01/16/24 1432 01/16/24 1803   BP: (!) 174/80 131/69 135/73 133/64   Pulse: 77      Resp: 18      Temp: 98.3 °F (36.8 °C)      TempSrc: Oral      SpO2: 100%      Weight: 103 kg (226 lb 15.4 oz)       01/16/24 2100 01/17/24 0419 01/17/24 0652   BP: (!) 92/55 (!) 109/57 113/60   Pulse:  82    Resp:      Temp:      TempSrc:      SpO2:  98%    Weight:          Abnormal Lab Results:  Labs Reviewed   CBC W/ AUTO DIFFERENTIAL - Abnormal; Notable for the following components:       Result Value    RBC 3.42 (*)     Hemoglobin 10.0 (*)     Hematocrit 30.1 (*)     Immature Granulocytes 0.6 (*)     All other components within normal limits   COMPREHENSIVE METABOLIC PANEL - Abnormal; Notable for the following components:    Glucose 142 (*)     Albumin 3.3 (*)     Anion Gap 7 (*)     All other components within normal limits   OCCULT BLOOD X 1, STOOL - Abnormal; Notable for the following components:    Occult Blood Positive (*)     All other components within normal limits   HEMATOCRIT - Abnormal; Notable for the following components:    Hematocrit 31.8 (*)     All other components within normal limits   HEMOGLOBIN - Abnormal; Notable for the following components:    Hemoglobin 10.5 (*)     All other components within normal limits   HEMOGLOBIN - Abnormal; Notable for the following components:    Hemoglobin 9.3 (*)     All other components within normal limits   HEMATOCRIT - Abnormal; Notable for the following components:    Hematocrit 28.6 (*)     All other components within normal limits   BASIC METABOLIC PANEL - Abnormal; Notable for the following components:    Calcium 8.6 (*)     Anion Gap 6 (*)     All other components within normal limits   HEMOGLOBIN - Abnormal; Notable for the following components:    Hemoglobin 9.9 (*)     All other components within  normal limits   HEMATOCRIT - Abnormal; Notable for the following components:    Hematocrit 30.4 (*)     All other components within normal limits   PROTIME-INR   POCT GLUCOSE, HAND-HELD DEVICE   POCT GLUCOSE, HAND-HELD DEVICE   POCT GLUCOSE, HAND-HELD DEVICE   POCT GLUCOSE, HAND-HELD DEVICE   TYPE & SCREEN   POCT GLUCOSE   POCT GLUCOSE   POCT GLUCOSE        All Lab Results:  Results for orders placed or performed during the hospital encounter of 01/16/24   CBC auto differential   Result Value Ref Range    WBC 5.02 3.90 - 12.70 K/uL    RBC 3.42 (L) 4.60 - 6.20 M/uL    Hemoglobin 10.0 (L) 14.0 - 18.0 g/dL    Hematocrit 30.1 (L) 40.0 - 54.0 %    MCV 88 82 - 98 fL    MCH 29.2 27.0 - 31.0 pg    MCHC 33.2 32.0 - 36.0 g/dL    RDW 14.2 11.5 - 14.5 %    Platelets 215 150 - 450 K/uL    MPV 9.9 9.2 - 12.9 fL    Immature Granulocytes 0.6 (H) 0.0 - 0.5 %    Gran # (ANC) 3.3 1.8 - 7.7 K/uL    Immature Grans (Abs) 0.03 0.00 - 0.04 K/uL    Lymph # 1.1 1.0 - 4.8 K/uL    Mono # 0.5 0.3 - 1.0 K/uL    Eos # 0.1 0.0 - 0.5 K/uL    Baso # 0.04 0.00 - 0.20 K/uL    nRBC 0 0 /100 WBC    Gran % 65.5 38.0 - 73.0 %    Lymph % 21.1 18.0 - 48.0 %    Mono % 9.8 4.0 - 15.0 %    Eosinophil % 2.2 0.0 - 8.0 %    Basophil % 0.8 0.0 - 1.9 %    Differential Method Automated    Comprehensive metabolic panel   Result Value Ref Range    Sodium 139 136 - 145 mmol/L    Potassium 4.2 3.5 - 5.1 mmol/L    Chloride 105 95 - 110 mmol/L    CO2 27 23 - 29 mmol/L    Glucose 142 (H) 70 - 110 mg/dL    BUN 17 8 - 23 mg/dL    Creatinine 1.0 0.5 - 1.4 mg/dL    Calcium 8.7 8.7 - 10.5 mg/dL    Total Protein 7.1 6.0 - 8.4 g/dL    Albumin 3.3 (L) 3.5 - 5.2 g/dL    Total Bilirubin 0.2 0.1 - 1.0 mg/dL    Alkaline Phosphatase 83 55 - 135 U/L    AST 31 10 - 40 U/L    ALT 26 10 - 44 U/L    eGFR >60 >60 mL/min/1.73 m^2    Anion Gap 7 (L) 8 - 16 mmol/L   Protime-INR   Result Value Ref Range    Prothrombin Time 11.3 9.0 - 12.5 sec    INR 1.1 0.8 - 1.2   Occult blood x 1, stool     Specimen: Stool   Result Value Ref Range    Occult Blood Positive (A) Negative   Hematocrit   Result Value Ref Range    Hematocrit 31.8 (L) 40.0 - 54.0 %   Hemoglobin   Result Value Ref Range    Hemoglobin 10.5 (L) 14.0 - 18.0 g/dL   Hemoglobin   Result Value Ref Range    Hemoglobin 9.3 (L) 14.0 - 18.0 g/dL   Hematocrit   Result Value Ref Range    Hematocrit 28.6 (L) 40.0 - 54.0 %   Basic Metabolic Panel (BMP)   Result Value Ref Range    Sodium 140 136 - 145 mmol/L    Potassium 4.0 3.5 - 5.1 mmol/L    Chloride 106 95 - 110 mmol/L    CO2 28 23 - 29 mmol/L    Glucose 91 70 - 110 mg/dL    BUN 14 8 - 23 mg/dL    Creatinine 0.9 0.5 - 1.4 mg/dL    Calcium 8.6 (L) 8.7 - 10.5 mg/dL    Anion Gap 6 (L) 8 - 16 mmol/L    eGFR >60 >60 mL/min/1.73 m^2   Hemoglobin   Result Value Ref Range    Hemoglobin 9.9 (L) 14.0 - 18.0 g/dL   Hematocrit   Result Value Ref Range    Hematocrit 30.4 (L) 40.0 - 54.0 %   Type & Screen   Result Value Ref Range    Group & Rh O POS     Indirect [place] NEG     Specimen Outdate 01/19/2024 23:59    POCT glucose   Result Value Ref Range    POCT Glucose 104 70 - 110 mg/dL   POCT glucose   Result Value Ref Range    POCT Glucose 88 70 - 110 mg/dL   POCT glucose   Result Value Ref Range    POCT Glucose 75 70 - 110 mg/dL     *Note: Due to a large number of results and/or encounters for the requested time period, some results have not been displayed. A complete set of results can be found in Results Review.     Imaging Results:  Imaging Results    None                 The Emergency Provider reviewed the vital signs and test results, which are outlined above.    ED Discussion     12:46 PM: Discussed pt's case with Dr. Watson (Gastroenterology) who recommends admission to Hospital Medicine, with plan for EGD tomorrow.    12:47 PM: Discussed case with Halina Villaseñor NP (Hospital Medicine). Dr. Hernandez agrees with current care and management of pt and accepts admission.   Admitting Service: [facility]  Medicine  Admitting Physician: Dr. Hernandez  Admit to: Obs Med Surg    12:48 PM: Re-evaluated pt. I have discussed test results, shared treatment plan, and the need for admission with patient and family at bedside. Pt and family express understanding at this time and agree with all information. All questions answered. Pt and family have no further questions or concerns at this time. Pt is ready for admit.           ED Medication(s):  Medications   amLODIPine tablet 10 mg (0 mg Oral Hold 1/17/24 0900)   carvediloL tablet 3.125 mg (0 mg Oral Hold 1/17/24 0745)   finasteride tablet 5 mg (5 mg Oral Given 1/16/24 2140)   furosemide tablet 40 mg (0 mg Oral Hold 1/17/24 0900)   insulin detemir U-100 (Levemir) pen 20 Units (20 Units Subcutaneous Given 1/16/24 2139)   propafenone tablet 150 mg (150 mg Oral Given 1/17/24 0652)   sodium chloride 0.9% flush 10 mL (has no administration in time range)   naloxone 0.4 mg/mL injection 0.02 mg (has no administration in time range)   glucose chewable tablet 16 g (has no administration in time range)   glucose chewable tablet 24 g (has no administration in time range)   glucagon (human recombinant) injection 1 mg (has no administration in time range)   acetaminophen tablet 650 mg (has no administration in time range)   melatonin tablet 6 mg (has no administration in time range)   insulin aspart U-100 pen 0-5 Units (has no administration in time range)   dextrose 10% bolus 125 mL 125 mL (has no administration in time range)   dextrose 10% bolus 250 mL 250 mL (has no administration in time range)   famotidine (PF) injection 20 mg (0 mg Intravenous Hold 1/17/24 0900)   levothyroxine tablet 300 mcg (has no administration in time range)         New Prescriptions    No medications on file         Medical Decision Making    Medical Decision Making  DDX:  1. Upper GI bleed  2. Side effect of anticoagulation  3. Dehydration          79 y/o male hx of chronic atrial fib on xarelto, chronic  mesenteric ischemia with chronic SMA occlusion and recent SMA stenting on 1/4/24 also on aspirin and plavix, states over the past week he has noticed his stool was becoming more dark and then looked melanotic today, denies abdominal pain, no dizziness, no hematemesis. His vital signs are stable, exam is benign, lab work reviewed and his hgb is 10 today slightly down from 11.4 10 days ago, BUN is normal,remainder of labs at baseline. Cased discussed with GI, patient not NPO but will need EGD, decision made to place patient in observation to [facility] service to monitor h/h and plans for EGD tomorrow.                Amount and/or Complexity of Data Reviewed  External Data Reviewed: labs, radiology, ECG and notes.     Details: Recent SMA stenting on 1/4/24 at [facility]  Labs: ordered. Decision-making details documented in ED Course.  Discussion of management or test interpretation with external provider(s): GI services    Risk  Decision regarding hospitalization.                Scribe Attestation:   Scribe #1: I performed the above scribed service and the documentation accurately describes the services I performed. I attest to the accuracy of the note.    Attending:   Physician Attestation Statement for Scribe #1: I, Ledy Davies MD, personally performed the services described in this documentation, as scribed by Pradip Avendaño, in my presence, and it is both accurate and complete.          Clinical Impression       ICD-10-CM ICD-9-CM   1. Melena  K92.1 578.1   2. Chronic anticoagulation  Z79.01 V58.61   3. Acute on chronic anemia  D64.9 285.9   4. Chest pain  R07.9 786.50       Disposition:   Disposition: Placed in Observation  Condition: Ledy Dorsey MD  01/17/24 0893

## 2024-01-16 NOTE — SUBJECTIVE & OBJECTIVE
Past Medical History:   Diagnosis Date    Aortic stenosis     Asthma     Benign prostatic hyperplasia with nocturia     Bilateral carotid artery stenosis 7/21/2021    US CAROTID BILATERAL 07/14/2021 IMPRESSION: Atherosclerosis with suspected stenosis greater than 50% at the right proximal ICA visually. Velocities are discordant and appear improved from prior. Atherosclerosis on the left with no evidence of flow-limiting stenosis greater than 50%.  FINDINGS: Right: Internal Carotid Artery (ICA): Peak systolic velocity 118 cm/sec. End diastolic velocity 35 cm/sec    BPH (benign prostatic hyperplasia)     CAD (coronary artery disease)     40% lesion 2002;violet    Cirrhosis     Claudication 4/9/2014    Colon polyp     COPD (chronic obstructive pulmonary disease)     ED (erectile dysfunction)     Encounter for blood transfusion     Ex-smoker     Hearing loss NEC     Hepatitis C     Cured;dr gibbs harvoni 2015    Hyperlipidemia     Hypertension     Hypothyroid     s/p tx graves    Open angle with borderline findings and low glaucoma risk in both eyes 9/22/2020    DELONTE on CPAP     Osteoarthritis     PVD (peripheral vascular disease)     Secondary esophageal varices without bleeding 6/26/2017    Type 2 diabetes mellitus        Past Surgical History:   Procedure Laterality Date    ANGIOGRAM, EXTREMITY, UNILATERAL Left 1/4/2024    Procedure: ANGIOGRAM, EXTREMITY, UNILATERAL- Femoral / SMS Stent, Poss General;  Surgeon: ALEX Alfred III, MD;  Location: [facility] OR [address] [place] [address];  Service: Vascular;  Laterality: Left;  mGy : 2698.78  Gy.cm : 229.88  Contrast : 62ml  Fluro time : 13.8min    CARDIAC CATHETERIZATION      CARDIAC SURGERY      CARPAL TUNNEL RELEASE Left     CATARACT EXTRACTION      CATARACT EXTRACTION W/ INTRAOCULAR LENS  IMPLANT, BILATERAL  2008    CATHETERIZATION OF BOTH LEFT AND RIGHT HEART N/A 11/2/2018    Procedure: CATHETERIZATION, HEART, BOTH LEFT AND RIGHT;  Surgeon: Frank Gallardo MD;  Location: [facility] CATH  LAB;  Service: Cardiology;  Laterality: N/A;    COLONOSCOPY  8/2013    COLONOSCOPY N/A 5/30/2016    Procedure: COLONOSCOPY;  Surgeon: Anna Tomas MD;  Location: [facility];  Service: Endoscopy;  Laterality: N/A;    COLONOSCOPY N/A 7/17/2019    Procedure: COLONOSCOPY;  Surgeon: David Carter III, MD;  Location: [facility];  Service: Endoscopy;  Laterality: N/A;    COLONOSCOPY N/A 12/14/2023    Procedure: COLONOSCOPY;  Surgeon: Ama Barrera MD;  Location: [facility];  Service: Endoscopy;  Laterality: N/A;    COMPUTED TOMOGRAPHY N/A 9/9/2019    Procedure: CT (COMPUTED TOMOGRAPHY);  Surgeon: [facility] Provider;  Location: [facility];  Service: General;  Laterality: N/A;  Microwave ablation to be done in CT.  Need CRNA and cart    COMPUTED TOMOGRAPHY N/A 1/25/2022    Procedure: Liver Microwave Ablation;  Surgeon: Red Puentes MD;  Location: [facility];  Service: General;  Laterality: N/A;    CORONARY ARTERY BYPASS GRAFT (CABG) N/A 11/5/2018    Procedure: CORONARY ARTERY BYPASS GRAFT (CABG);  Surgeon: Bear De Luna MD;  Location: [facility];  Service: Cardiothoracic;  Laterality: N/A;  TWO VESSEL BYPASS WITH AORTOTOMY AND EXCISION OF ATHEROSCLEROTIC PLAQUE    CORONARY BYPASS GRAFT ANGIOGRAPHY  3/2/2020    Procedure: Bypass graft study;  Surgeon: Cora Cormier MD;  Location: [facility];  Service: Cardiology;;    ELBOW BURSA SURGERY Right 2010    ENDOSCOPIC HARVEST OF VEIN Left 11/5/2018    Procedure: SURGICAL PROCUREMENT, VEIN, ENDOSCOPIC;  Surgeon: Bear De Luna MD;  Location: [facility];  Service: Cardiothoracic;  Laterality: Left;    ESOPHAGOGASTRODUODENOSCOPY N/A 7/17/2019    Procedure: ESOPHAGOGASTRODUODENOSCOPY (EGD);  Surgeon: David Carter III, MD;  Location: [facility];  Service: Endoscopy;  Laterality: N/A;    ESOPHAGOGASTRODUODENOSCOPY N/A 12/29/2022    Procedure: ESOPHAGOGASTRODUODENOSCOPY (EGD);  Surgeon: Issa Gayle MD;  Location: [facility];  Service: Endoscopy;  Laterality:  N/A;    ETHMOIDECTOMY Bilateral 3/27/2019    Procedure: ETHMOIDECTOMY;  Surgeon: Alejandro Parkinson MD;  Location: [facility] OR;  Service: ENT;  Laterality: Bilateral;    EYE SURGERY      FOOT SURGERY      FRONTAL SINUS OBLITERATION Bilateral 3/27/2019    Procedure: SINUSOTOMY, FRONTAL SINUS, OBLITERATIVE;  Surgeon: Alejandro Parkinson MD;  Location: [facility] OR;  Service: ENT;  Laterality: Bilateral;    FUNCTIONAL ENDOSCOPIC SINUS SURGERY (FESS) Bilateral 3/27/2019    Procedure: FESS (FUNCTIONAL ENDOSCOPIC SINUS SURGERY);  Surgeon: Alejandro Parkinson MD;  Location: [facility] OR;  Service: ENT;  Laterality: Bilateral;  Left Keila bullosa resection     KNEE ARTHROSCOPY W/ MENISCAL REPAIR Left 06/2019    dr boykin    KNEE SURGERY Right     LEFT HEART CATHETERIZATION Left 3/2/2020    Procedure: CATHETERIZATION, HEART, LEFT;  Surgeon: Cora Cormier MD;  Location: [facility] CATH LAB;  Service: Cardiology;  Laterality: Left;    LIVER BIOPSY  01/2022    MAXILLARY ANTROSTOMY Bilateral 3/27/2019    Procedure: MAXILLARY ANTROSTOMY;  Surgeon: Alejandro Parkinson MD;  Location: [facility] OR;  Service: ENT;  Laterality: Bilateral;    NASAL SEPTOPLASTY N/A 3/27/2019    Procedure: SEPTOPLASTY, NOSE;  Surgeon: Alejandro Parkinson MD;  Location: [facility] OR;  Service: ENT;  Laterality: N/A;    RECTAL SURGERY  2012    STENT, SUPERIOR MESENTERIC ARTERY Left 1/4/2024    Procedure: STENT, SUPERIOR MESENTERIC ARTERY;  Surgeon: ALEX Alfred III, MD;  Location: [address];  Service: Vascular;  Laterality: Left;    TRANSURETHRAL RESECTION OF PROSTATE (TURP) WITHOUT USE OF LASER N/A 6/19/2018    Procedure: TURP, WITHOUT USING LASER;  Surgeon: Cooper Cordova IV, MD;  Location: [facility] OR;  Service: Urology;  Laterality: N/A;    TRIGGER FINGER RELEASE Left        Review of patient's allergies indicates:   Allergen Reactions    Lipitor [atorvastatin] Other (See Comments)     Muscle aches and pains as well as fatigue.     Niacin preparations Other (See Comments)     Causes broken blood vessels and  bruises at 4 times normal dose.    Statins-hmg-coa reductase inhibitors Other (See Comments)     Statins cause intolerable myalgias.    Iodinated contrast media Hives     Tachycardia    Omeprazole Hives and Itching    Prilosec [omeprazole magnesium] Hives and Itching     Family History       Problem Relation (Age of Onset)    Heart disease Mother, Father, Brother          Tobacco Use    Smoking status: Former     Current packs/day: 0.00     Average packs/day: 0.5 packs/day for 4.0 years (2.0 ttl pk-yrs)     Types: Cigarettes     Start date: 1968     Quit date: 1972     Years since quittin.6    Smokeless tobacco: Former     Types: Chew     Quit date: 1976   Substance and Sexual Activity    Alcohol use: Yes     Alcohol/week: 2.0 standard drinks of alcohol     Types: 2 Cans of beer per week    Drug use: No    Sexual activity: Yes     Partners: Female     Review of Systems   Constitutional:  Negative for fever.   HENT:  Negative for hearing loss.    Eyes:  Negative for visual disturbance.   Respiratory:  Negative for cough and shortness of breath.    Cardiovascular:  Negative for chest pain and palpitations.   Gastrointestinal:         As per HPI.   Genitourinary:  Negative for difficulty urinating, dysuria, frequency and hematuria.   Musculoskeletal:  Negative for arthralgias and back pain.   Skin:  Negative for color change and rash.   Neurological:  Negative for seizures, syncope, weakness, numbness and headaches.   Hematological:  Does not bruise/bleed easily.   Psychiatric/Behavioral:  The patient is not nervous/anxious.      Objective:     Vital Signs (Most Recent):  Temp: 98.3 °F (36.8 °C) (24 1109)  Pulse: 77 (24 1109)  Resp: 18 (24 1109)  BP: 131/69 (24 1221)  SpO2: 100 % (24 1109) Vital Signs (24h Range):  Temp:  [98.3 °F (36.8 °C)] 98.3 °F (36.8 °C)  Pulse:  [77] 77  Resp:  [18] 18  SpO2:  [100 %] 100 %  BP: (131-174)/(69-80) 131/69     Weight: 103 kg (226 lb  15.4 oz) (01/16/24 1109)  Body mass index is 35.55 kg/m².    No intake or output data in the 24 hours ending 01/16/24 1407    Lines/Drains/Airways       None                    Physical Exam  Constitutional:       General: He is not in acute distress.     Appearance: Normal appearance. He is well-developed.   HENT:      Head: Normocephalic and atraumatic.   Eyes:      Extraocular Movements: Extraocular movements intact.   Cardiovascular:      Rate and Rhythm: Normal rate and regular rhythm.      Heart sounds: Murmur heard.   Pulmonary:      Effort: Pulmonary effort is normal. No respiratory distress.      Breath sounds: Normal breath sounds. No wheezing.   Abdominal:      General: Bowel sounds are normal. There is no distension.      Palpations: Abdomen is soft. There is no mass.      Tenderness: There is no abdominal tenderness.   Musculoskeletal:      Cervical back: Normal range of motion and neck supple.      Right lower leg: No edema.      Left lower leg: No edema.   Skin:     General: Skin is warm and dry.      Findings: No rash.   Neurological:      Mental Status: He is alert and oriented to person, place, and time.      Cranial Nerves: No cranial nerve deficit.   Psychiatric:         Behavior: Behavior normal.          Significant Labs:  CBC:   Recent Labs   Lab 01/16/24  1142   WBC 5.02   HGB 10.0*   HCT 30.1*        CMP:   Recent Labs   Lab 01/16/24  1142   *   CALCIUM 8.7   ALBUMIN 3.3*   PROT 7.1      K 4.2   CO2 27      BUN 17   CREATININE 1.0   ALKPHOS 83   ALT 26   AST 31   BILITOT 0.2     Coagulation:   Recent Labs   Lab 01/16/24  1142   INR 1.1       Significant Imaging:  Imaging results within the past 24 hours have been reviewed.

## 2024-01-16 NOTE — ASSESSMENT & PLAN NOTE
S/p SMA stent 01/05/24 with improvement in abdominal pain.   Prior colonoscopy showed ischemic ulcers.

## 2024-01-16 NOTE — HPI
Ms. Pretty aracely  78 year old male with a history of HCC, esophageal varices, HTN, HLD, PAF (on xarelto), CAD, BPH, DELONTE on cpap, liver cirrhosis and mesenteric artery stenosis with recent stenting of SMA (on plavix and asa) who presents to the ED with complaints of dark stools x 1 week.  He denies any BRBPR, denies hematemsis, weakness/dizziness, fever/chills, sob.  He denies any NSAID usage.   In the ED, lab work notable for Hgb 10, , INR 1.1, BUN 17 and creatinine 1.0.   Case discussed with GI, will plan for EGD in am.  Last dose of plavix/asa this am 1/16, last dose of xarelto 1/15 pm.  Patient will be admitted to observation for melena.      Code Status DNR,   Surrogate Decision maker lazaro Sales

## 2024-01-16 NOTE — TELEPHONE ENCOUNTER
Contacted pt in response to message. States stool has been dark for the past two days and stool is now black this morning. Pt reports taking Plavix, Xarelto, and ASA. Denies rebecca red blood in stool, nose bleed, or any other signs of bleeding. Nurse instructed pt to report to closest ED [place] [facility] [place]. Pt verbalized understanding and states he will report to ED.----- Message from Stacie Ortiz sent at 1/16/2024  9:18 AM CST -----  Regarding: pt advice  Contact: [phone]  Pt is calling to speak with someone in provider office in regards to his stool turning dark pt stated he would like to speak with Tigist pt  is asking for a return call please call pt at   [phone] pt stated it's been black since yesterday, pt stated he feels fine

## 2024-01-16 NOTE — CONSULTS
"[facility] - Emergency Dept.  Gastroenterology  Consult Note    Patient Name: Erendira Pretty  MRN: [id number]  Admission Date: 1/16/2024  Hospital Length of Stay: 0 days  Code Status: DNR   Attending Provider: Tima Hernandez MD   Consulting Provider: Phi Cordero PA-C  Primary Care Physician: Bhupinder Callaway MD  Principal Problem:<principal problem not specified>    Inpatient consult to Gastroenterology  Consult performed by: Phi Cordero PA-C  Consult ordered by: Halina Villaseñor NP  Reason for consult: Melena      Subjective:     HPI:  The patient presented to the ER with black stools. He noticed them getting darker about a week ago and then black this morning. His history is significant for SMA stenting 01/05/2024, liver cirrhosis and HCC (tx'd w/ ablation) with small esophageal varices and gastritis. He was on Xarelto prior to his stenting but was started on ASA and Plavix after his stent was placed. He has otherwise been feeling great. His abdominal pain has resolved. He denies nausea, vomiting or issues with appetite. He denies NSAID use. In the ER, he is hemodynamically stable. Hgb 10, , INR 1.1, BUN 17 and creatinine 1.0.      EGD (12/2022): "small" esophageal varices and gastritis.   Colonoscopy (12/2023): colon polyps and ischemic ulcers which led to the recent SMA stenting.      Past Medical History:   Diagnosis Date    Aortic stenosis     Asthma     Benign prostatic hyperplasia with nocturia     Bilateral carotid artery stenosis 7/21/2021    US CAROTID BILATERAL 07/14/2021 IMPRESSION: Atherosclerosis with suspected stenosis greater than 50% at the right proximal ICA visually. Velocities are discordant and appear improved from prior. Atherosclerosis on the left with no evidence of flow-limiting stenosis greater than 50%.  FINDINGS: Right: Internal Carotid Artery (ICA): Peak systolic velocity 118 cm/sec. End diastolic velocity 35 cm/sec    BPH (benign prostatic hyperplasia)     CAD " (coronary artery disease)     40% lesion 2002;lazo    Cirrhosis     Claudication 4/9/2014    Colon polyp     COPD (chronic obstructive pulmonary disease)     ED (erectile dysfunction)     Encounter for blood transfusion     Ex-smoker     Hearing loss NEC     Hepatitis C     Cured;reji brown 2015    Hyperlipidemia     Hypertension     Hypothyroid     s/p tx graves    Open angle with borderline findings and low glaucoma risk in both eyes 9/22/2020    DELONTE on CPAP     Osteoarthritis     PVD (peripheral vascular disease)     Secondary esophageal varices without bleeding 6/26/2017    Type 2 diabetes mellitus        Past Surgical History:   Procedure Laterality Date    ANGIOGRAM, EXTREMITY, UNILATERAL Left 1/4/2024    Procedure: ANGIOGRAM, EXTREMITY, UNILATERAL- Femoral / SMS Stent, Poss General;  Surgeon: ALEX Alfred III, MD;  Location: [facility] OR [address] [place] [address];  Service: Vascular;  Laterality: Left;  mGy : 2698.78  Gy.cm : 229.88  Contrast : 62ml  Fluro time : 13.8min    CARDIAC CATHETERIZATION      CARDIAC SURGERY      CARPAL TUNNEL RELEASE Left     CATARACT EXTRACTION      CATARACT EXTRACTION W/ INTRAOCULAR LENS  IMPLANT, BILATERAL  2008    CATHETERIZATION OF BOTH LEFT AND RIGHT HEART N/A 11/2/2018    Procedure: CATHETERIZATION, HEART, BOTH LEFT AND RIGHT;  Surgeon: Frank Lazo MD;  Location: [facility] CATH LAB;  Service: Cardiology;  Laterality: N/A;    COLONOSCOPY  8/2013    COLONOSCOPY N/A 5/30/2016    Procedure: COLONOSCOPY;  Surgeon: Anna Tomas MD;  Location: [facility] ENDO;  Service: Endoscopy;  Laterality: N/A;    COLONOSCOPY N/A 7/17/2019    Procedure: COLONOSCOPY;  Surgeon: David Carter III, MD;  Location: [facility];  Service: Endoscopy;  Laterality: N/A;    COLONOSCOPY N/A 12/14/2023    Procedure: COLONOSCOPY;  Surgeon: Ama Barrera MD;  Location: [facility] ENDO;  Service: Endoscopy;  Laterality: N/A;    COMPUTED TOMOGRAPHY N/A 9/9/2019    Procedure: CT (COMPUTED  TOMOGRAPHY);  Surgeon: [facility] Diagnostic Provider;  Location: [facility] OR;  Service: General;  Laterality: N/A;  Microwave ablation to be done in CT.  Need CRNA and cart    COMPUTED TOMOGRAPHY N/A 1/25/2022    Procedure: Liver Microwave Ablation;  Surgeon: Red Puentes MD;  Location: [facility] TOM;  Service: General;  Laterality: N/A;    CORONARY ARTERY BYPASS GRAFT (CABG) N/A 11/5/2018    Procedure: CORONARY ARTERY BYPASS GRAFT (CABG);  Surgeon: Bear De Luna MD;  Location: [facility] OR;  Service: Cardiothoracic;  Laterality: N/A;  TWO VESSEL BYPASS WITH AORTOTOMY AND EXCISION OF ATHEROSCLEROTIC PLAQUE    CORONARY BYPASS GRAFT ANGIOGRAPHY  3/2/2020    Procedure: Bypass graft study;  Surgeon: Cora Cormier MD;  Location: [facility] CATH LAB;  Service: Cardiology;;    ELBOW BURSA SURGERY Right 2010    ENDOSCOPIC HARVEST OF VEIN Left 11/5/2018    Procedure: SURGICAL PROCUREMENT, VEIN, ENDOSCOPIC;  Surgeon: Bear De Luna MD;  Location: [facility] OR;  Service: Cardiothoracic;  Laterality: Left;    ESOPHAGOGASTRODUODENOSCOPY N/A 7/17/2019    Procedure: ESOPHAGOGASTRODUODENOSCOPY (EGD);  Surgeon: David Carter III, MD;  Location: [facility] ENDO;  Service: Endoscopy;  Laterality: N/A;    ESOPHAGOGASTRODUODENOSCOPY N/A 12/29/2022    Procedure: ESOPHAGOGASTRODUODENOSCOPY (EGD);  Surgeon: Issa Gayle MD;  Location: [facility] ENDO;  Service: Endoscopy;  Laterality: N/A;    ETHMOIDECTOMY Bilateral 3/27/2019    Procedure: ETHMOIDECTOMY;  Surgeon: Alejandro Parkinson MD;  Location: [facility] OR;  Service: ENT;  Laterality: Bilateral;    EYE SURGERY      FOOT SURGERY      FRONTAL SINUS OBLITERATION Bilateral 3/27/2019    Procedure: SINUSOTOMY, FRONTAL SINUS, OBLITERATIVE;  Surgeon: Alejandro Parkinson MD;  Location: [facility];  Service: ENT;  Laterality: Bilateral;    FUNCTIONAL ENDOSCOPIC SINUS SURGERY (FESS) Bilateral 3/27/2019    Procedure: FESS (FUNCTIONAL ENDOSCOPIC SINUS SURGERY);  Surgeon: Alejandro Parkinson MD;  Location: [facility] OR;  Service: ENT;   Laterality: Bilateral;  Left Keila bullosa resection     KNEE ARTHROSCOPY W/ MENISCAL REPAIR Left 06/2019    dr boykin    KNEE SURGERY Right     LEFT HEART CATHETERIZATION Left 3/2/2020    Procedure: CATHETERIZATION, HEART, LEFT;  Surgeon: Cora Cormier MD;  Location: [facility] CATH LAB;  Service: Cardiology;  Laterality: Left;    LIVER BIOPSY  01/2022    MAXILLARY ANTROSTOMY Bilateral 3/27/2019    Procedure: MAXILLARY ANTROSTOMY;  Surgeon: Alejandro Parkinson MD;  Location: [facility] OR;  Service: ENT;  Laterality: Bilateral;    NASAL SEPTOPLASTY N/A 3/27/2019    Procedure: SEPTOPLASTY, NOSE;  Surgeon: Alejandro Parkinson MD;  Location: [facility] OR;  Service: ENT;  Laterality: N/A;    RECTAL SURGERY  2012    STENT, SUPERIOR MESENTERIC ARTERY Left 1/4/2024    Procedure: STENT, SUPERIOR MESENTERIC ARTERY;  Surgeon: ALEX Alfred III, MD;  Location: [address];  Service: Vascular;  Laterality: Left;    TRANSURETHRAL RESECTION OF PROSTATE (TURP) WITHOUT USE OF LASER N/A 6/19/2018    Procedure: TURP, WITHOUT USING LASER;  Surgeon: Cooper Cordova IV, MD;  Location: [facility] OR;  Service: Urology;  Laterality: N/A;    TRIGGER FINGER RELEASE Left        Review of patient's allergies indicates:   Allergen Reactions    Lipitor [atorvastatin] Other (See Comments)     Muscle aches and pains as well as fatigue.     Niacin preparations Other (See Comments)     Causes broken blood vessels and bruises at 4 times normal dose.    Statins-hmg-coa reductase inhibitors Other (See Comments)     Statins cause intolerable myalgias.    Iodinated contrast media Hives     Tachycardia    Omeprazole Hives and Itching    Prilosec [omeprazole magnesium] Hives and Itching     Family History       Problem Relation (Age of Onset)    Heart disease Mother, Father, Brother          Tobacco Use    Smoking status: Former     Current packs/day: 0.00     Average packs/day: 0.5 packs/day for 4.0 years (2.0 ttl pk-yrs)     Types: Cigarettes     Start date:  1968     Quit date: 1972     Years since quittin.6    Smokeless tobacco: Former     Types: Chew     Quit date: 1976   Substance and Sexual Activity    Alcohol use: Yes     Alcohol/week: 2.0 standard drinks of alcohol     Types: 2 Cans of beer per week    Drug use: No    Sexual activity: Yes     Partners: Female     Review of Systems   Constitutional:  Negative for fever.   HENT:  Negative for hearing loss.    Eyes:  Negative for visual disturbance.   Respiratory:  Negative for cough and shortness of breath.    Cardiovascular:  Negative for chest pain and palpitations.   Gastrointestinal:         As per HPI.   Genitourinary:  Negative for difficulty urinating, dysuria, frequency and hematuria.   Musculoskeletal:  Negative for arthralgias and back pain.   Skin:  Negative for color change and rash.   Neurological:  Negative for seizures, syncope, weakness, numbness and headaches.   Hematological:  Does not bruise/bleed easily.   Psychiatric/Behavioral:  The patient is not nervous/anxious.      Objective:     Vital Signs (Most Recent):  Temp: 98.3 °F (36.8 °C) (24 1109)  Pulse: 77 (24 1109)  Resp: 18 (24 1109)  BP: 131/69 (24 1221)  SpO2: 100 % (24 1109) Vital Signs (24h Range):  Temp:  [98.3 °F (36.8 °C)] 98.3 °F (36.8 °C)  Pulse:  [77] 77  Resp:  [18] 18  SpO2:  [100 %] 100 %  BP: (131-174)/(69-80) 131/69     Weight: 103 kg (226 lb 15.4 oz) (24 1109)  Body mass index is 35.55 kg/m².    No intake or output data in the 24 hours ending 24 1407    Lines/Drains/Airways       None                    Physical Exam  Constitutional:       General: He is not in acute distress.     Appearance: Normal appearance. He is well-developed.   HENT:      Head: Normocephalic and atraumatic.   Eyes:      Extraocular Movements: Extraocular movements intact.   Cardiovascular:      Rate and Rhythm: Normal rate and regular rhythm.      Heart sounds: Murmur heard.   Pulmonary:       Effort: Pulmonary effort is normal. No respiratory distress.      Breath sounds: Normal breath sounds. No wheezing.   Abdominal:      General: Bowel sounds are normal. There is no distension.      Palpations: Abdomen is soft. There is no mass.      Tenderness: There is no abdominal tenderness.   Musculoskeletal:      Cervical back: Normal range of motion and neck supple.      Right lower leg: No edema.      Left lower leg: No edema.   Skin:     General: Skin is warm and dry.      Findings: No rash.   Neurological:      Mental Status: He is alert and oriented to person, place, and time.      Cranial Nerves: No cranial nerve deficit.   Psychiatric:         Behavior: Behavior normal.          Significant Labs:  CBC:   Recent Labs   Lab 01/16/24  1142   WBC 5.02   HGB 10.0*   HCT 30.1*        CMP:   Recent Labs   Lab 01/16/24  1142   *   CALCIUM 8.7   ALBUMIN 3.3*   PROT 7.1      K 4.2   CO2 27      BUN 17   CREATININE 1.0   ALKPHOS 83   ALT 26   AST 31   BILITOT 0.2     Coagulation:   Recent Labs   Lab 01/16/24  1142   INR 1.1       Significant Imaging:  Imaging results within the past 24 hours have been reviewed.  Assessment/Plan:     GI  Mesenteric artery stenosis  S/p SMA stent 01/05/24 with improvement in abdominal pain.   Prior colonoscopy showed ischemic ulcers.      Other cirrhosis of liver  Compensated liver disease with low meld. Monitor.     MELD 3.0: 7 at 1/16/2024 11:42 AM  MELD-Na: 7 at 1/16/2024 11:42 AM  Calculated from:  Serum Creatinine: 1.0 mg/dL at 1/16/2024 11:42 AM  Serum Sodium: 139 mmol/L (Using max of 137 mmol/L) at 1/16/2024 11:42 AM  Total Bilirubin: 0.2 mg/dL (Using min of 1 mg/dL) at 1/16/2024 11:42 AM  Serum Albumin: 3.3 g/dL at 1/16/2024 11:42 AM  INR(ratio): 1.1 at 1/16/2024 11:42 AM  Age at listing (hypothetical): 78 years  Sex: Male at 1/16/2024 11:42 AM        Melena  Cirrhotic patient with black stools after recently starting Plavix and ASA.   Last dose  of Plavix was this morning. Last dose Xarelto was yesterday evening.   Will consider EGD this admit.   Ok for liquid diet today.   Start Pepcid IV since patient has PPI allergy.   Monitor H/H and transfuse as indicated.       Thank you for your consult. I will follow-up with patient. Please contact us if you have any additional questions.    Phi Cordero PA-C  Gastroenterology  [facility] - Emergency Dept.

## 2024-01-16 NOTE — SUBJECTIVE & OBJECTIVE
Past Medical History:   Diagnosis Date    Aortic stenosis     Asthma     Benign prostatic hyperplasia with nocturia     Bilateral carotid artery stenosis 7/21/2021    US CAROTID BILATERAL 07/14/2021 IMPRESSION: Atherosclerosis with suspected stenosis greater than 50% at the right proximal ICA visually. Velocities are discordant and appear improved from prior. Atherosclerosis on the left with no evidence of flow-limiting stenosis greater than 50%.  FINDINGS: Right: Internal Carotid Artery (ICA): Peak systolic velocity 118 cm/sec. End diastolic velocity 35 cm/sec    BPH (benign prostatic hyperplasia)     CAD (coronary artery disease)     40% lesion 2002;violet    Cirrhosis     Claudication 4/9/2014    Colon polyp     COPD (chronic obstructive pulmonary disease)     ED (erectile dysfunction)     Encounter for blood transfusion     Ex-smoker     Hearing loss NEC     Hepatitis C     Cured;dr gibbs harvoni 2015    Hyperlipidemia     Hypertension     Hypothyroid     s/p tx graves    Open angle with borderline findings and low glaucoma risk in both eyes 9/22/2020    DELONTE on CPAP     Osteoarthritis     PVD (peripheral vascular disease)     Secondary esophageal varices without bleeding 6/26/2017    Type 2 diabetes mellitus        Past Surgical History:   Procedure Laterality Date    ANGIOGRAM, EXTREMITY, UNILATERAL Left 1/4/2024    Procedure: ANGIOGRAM, EXTREMITY, UNILATERAL- Femoral / SMS Stent, Poss General;  Surgeon: ALEX Alfred III, MD;  Location: [facility] OR [address] [place] [address];  Service: Vascular;  Laterality: Left;  mGy : 2698.78  Gy.cm : 229.88  Contrast : 62ml  Fluro time : 13.8min    CARDIAC CATHETERIZATION      CARDIAC SURGERY      CARPAL TUNNEL RELEASE Left     CATARACT EXTRACTION      CATARACT EXTRACTION W/ INTRAOCULAR LENS  IMPLANT, BILATERAL  2008    CATHETERIZATION OF BOTH LEFT AND RIGHT HEART N/A 11/2/2018    Procedure: CATHETERIZATION, HEART, BOTH LEFT AND RIGHT;  Surgeon: Frank Gallardo MD;  Location: [facility] CATH  LAB;  Service: Cardiology;  Laterality: N/A;    COLONOSCOPY  8/2013    COLONOSCOPY N/A 5/30/2016    Procedure: COLONOSCOPY;  Surgeon: Anna Tomas MD;  Location: [facility] ENDO;  Service: Endoscopy;  Laterality: N/A;    COLONOSCOPY N/A 7/17/2019    Procedure: COLONOSCOPY;  Surgeon: David Carter III, MD;  Location: [facility]O;  Service: Endoscopy;  Laterality: N/A;    COLONOSCOPY N/A 12/14/2023    Procedure: COLONOSCOPY;  Surgeon: Ama Barrera MD;  Location: [facility];  Service: Endoscopy;  Laterality: N/A;    COMPUTED TOMOGRAPHY N/A 9/9/2019    Procedure: CT (COMPUTED TOMOGRAPHY);  Surgeon: [facility] Diagnostic Provider;  Location: [facility];  Service: General;  Laterality: N/A;  Microwave ablation to be done in CT.  Need CRNA and cart    COMPUTED TOMOGRAPHY N/A 1/25/2022    Procedure: Liver Microwave Ablation;  Surgeon: Red Puentes MD;  Location: [facility];  Service: General;  Laterality: N/A;    CORONARY ARTERY BYPASS GRAFT (CABG) N/A 11/5/2018    Procedure: CORONARY ARTERY BYPASS GRAFT (CABG);  Surgeon: Bear De Luna MD;  Location: [facility];  Service: Cardiothoracic;  Laterality: N/A;  TWO VESSEL BYPASS WITH AORTOTOMY AND EXCISION OF ATHEROSCLEROTIC PLAQUE    CORONARY BYPASS GRAFT ANGIOGRAPHY  3/2/2020    Procedure: Bypass graft study;  Surgeon: Cora Cormier MD;  Location: [facility] CATH LAB;  Service: Cardiology;;    ELBOW BURSA SURGERY Right 2010    ENDOSCOPIC HARVEST OF VEIN Left 11/5/2018    Procedure: SURGICAL PROCUREMENT, VEIN, ENDOSCOPIC;  Surgeon: Bear De Luna MD;  Location: [facility];  Service: Cardiothoracic;  Laterality: Left;    ESOPHAGOGASTRODUODENOSCOPY N/A 7/17/2019    Procedure: ESOPHAGOGASTRODUODENOSCOPY (EGD);  Surgeon: David Carter III, MD;  Location: [facility];  Service: Endoscopy;  Laterality: N/A;    ESOPHAGOGASTRODUODENOSCOPY N/A 12/29/2022    Procedure: ESOPHAGOGASTRODUODENOSCOPY (EGD);  Surgeon: Issa Gayle MD;  Location: [facility];  Service: Endoscopy;  Laterality:  N/A;    ETHMOIDECTOMY Bilateral 3/27/2019    Procedure: ETHMOIDECTOMY;  Surgeon: Alejandro Parkinson MD;  Location: [facility] OR;  Service: ENT;  Laterality: Bilateral;    EYE SURGERY      FOOT SURGERY      FRONTAL SINUS OBLITERATION Bilateral 3/27/2019    Procedure: SINUSOTOMY, FRONTAL SINUS, OBLITERATIVE;  Surgeon: Alejandro Parkinson MD;  Location: [facility] OR;  Service: ENT;  Laterality: Bilateral;    FUNCTIONAL ENDOSCOPIC SINUS SURGERY (FESS) Bilateral 3/27/2019    Procedure: FESS (FUNCTIONAL ENDOSCOPIC SINUS SURGERY);  Surgeon: Alejandro Parkinson MD;  Location: [facility] OR;  Service: ENT;  Laterality: Bilateral;  Left Keila bullosa resection     KNEE ARTHROSCOPY W/ MENISCAL REPAIR Left 06/2019    dr boykin    KNEE SURGERY Right     LEFT HEART CATHETERIZATION Left 3/2/2020    Procedure: CATHETERIZATION, HEART, LEFT;  Surgeon: Cora Cormier MD;  Location: [facility] CATH LAB;  Service: Cardiology;  Laterality: Left;    LIVER BIOPSY  01/2022    MAXILLARY ANTROSTOMY Bilateral 3/27/2019    Procedure: MAXILLARY ANTROSTOMY;  Surgeon: Alejandro Parkinson MD;  Location: [facility] OR;  Service: ENT;  Laterality: Bilateral;    NASAL SEPTOPLASTY N/A 3/27/2019    Procedure: SEPTOPLASTY, NOSE;  Surgeon: Alejandro Parkinson MD;  Location: [facility] OR;  Service: ENT;  Laterality: N/A;    RECTAL SURGERY  2012    STENT, SUPERIOR MESENTERIC ARTERY Left 1/4/2024    Procedure: STENT, SUPERIOR MESENTERIC ARTERY;  Surgeon: ALEX Alfred III, MD;  Location: [address];  Service: Vascular;  Laterality: Left;    TRANSURETHRAL RESECTION OF PROSTATE (TURP) WITHOUT USE OF LASER N/A 6/19/2018    Procedure: TURP, WITHOUT USING LASER;  Surgeon: Cooper Cordova IV, MD;  Location: [facility] OR;  Service: Urology;  Laterality: N/A;    TRIGGER FINGER RELEASE Left        Review of patient's allergies indicates:   Allergen Reactions    Lipitor [atorvastatin] Other (See Comments)     Muscle aches and pains as well as fatigue.     Niacin preparations Other (See Comments)     Causes broken blood vessels and  bruises at 4 times normal dose.    Statins-hmg-coa reductase inhibitors Other (See Comments)     Statins cause intolerable myalgias.    Iodinated contrast media Hives     Tachycardia    Omeprazole Hives and Itching    Prilosec [omeprazole magnesium] Hives and Itching       Current Facility-Administered Medications on File Prior to Encounter   Medication    lactated ringers infusion     Current Outpatient Medications on File Prior to Encounter   Medication Sig    ACCU-CHEK GRACE PLUS METER Misc AS DIRECTED    acetaminophen (TYLENOL) 325 MG tablet Take 2 tablets (650 mg total) by mouth every 8 (eight) hours as needed for Pain.    albuterol (VENTOLIN HFA) 90 mcg/actuation inhaler Inhale 2 puffs into the lungs every 6 (six) hours as needed for Wheezing or Shortness of Breath. Rescue    amLODIPine (NORVASC) 10 MG tablet TAKE 1 TABLET BY MOUTH ONCE A DAY (DOSE INCREASE) (Patient taking differently: Take by mouth every morning.)    aspirin (ECOTRIN) 81 MG EC tablet Take 81 mg by mouth every morning.    azelastine (ASTELIN) 137 mcg (0.1 %) nasal spray 2 sprays (274 mcg total) by Nasal route 2 (two) times daily.    blood sugar diagnostic (ACCU-CHEK GRACE PLUS TEST STRP) Strp TEST THREE TIMES A DAY    budesonide-formoterol 160-4.5 mcg (SYMBICORT) 160-4.5 mcg/actuation HFAA INHALE 2 PUFFS INTO THE LUNGS TWO TIMES A DAY.    carvediloL (COREG) 3.125 MG tablet Take 1 tablet (3.125 mg total) by mouth 2 (two) times daily with meals.    clopidogreL (PLAVIX) 75 mg tablet Take 1 tablet (75 mg total) by mouth once daily.    diphenhydrAMINE (BENADRYL) 50 MG capsule Take 50mg by mouth 1 hour before contrast administration.    empagliflozin (JARDIANCE) 25 mg tablet Take 1 tablet (25 mg total) by mouth once daily. (Patient taking differently: Take 25 mg by mouth every morning.)    famotidine (PEPCID) 40 MG tablet TAKE 1 TABLET BY MOUTH EVERY EVENING    finasteride (PROSCAR) 5 mg tablet TAKE 1 TABLET BY MOUTH once daily (Patient taking  "differently: Take 5 mg by mouth every evening.)    fluticasone propionate (FLONASE) 50 mcg/actuation nasal spray 2 sprays (100 mcg total) by Each Nostril route once daily.    furosemide (LASIX) 40 MG tablet Take 1 tablet (40 mg total) by mouth 2 (two) times daily.    GLUCOSAMINE HCL/CHONDR ROSARIO A NA (OSTEO BI-FLEX ORAL) Take 1 tablet by mouth 2 (two) times daily.     insulin (LANTUS SOLOSTAR U-100 INSULIN) glargine 100 units/mL SubQ pen Inject 44 Units into the skin every evening.    ipratropium (ATROVENT) 21 mcg (0.03 %) nasal spray 2 sprays by Each Nostril route 2 (two) times daily.    krill oil 500 mg Cap Take by mouth.    lancets (ACCU-CHEK FASTCLIX LANCET DRUM) Misc TEST TWO TIMES A DAY    levocetirizine (XYZAL) 5 MG tablet Take 1 tablet (5 mg total) by mouth every evening.    levothyroxine (SYNTHROID) 300 MCG Tab Take 1 tablet (300 mcg total) by mouth every morning.    lisinopriL 10 MG tablet TAKE 1 BY MOUTH EVERY DAY (Patient taking differently: Take 10 mg by mouth every morning.)    meloxicam (MOBIC) 15 MG tablet Take 15 mg by mouth.    metFORMIN (GLUCOPHAGE-XR) 500 MG ER 24hr tablet Take 1 tablet (500 mg total) by mouth 2 (two) times daily with meals.    montelukast (SINGULAIR) 10 mg tablet Take 1 tablet (10 mg total) by mouth once daily. (Patient taking differently: Take 10 mg by mouth every evening.)    pen needle, diabetic (BD ULTRA-FINE MINI PEN NEEDLE) 31 gauge x 3/16" Ndle USE AS DIRECTED    predniSONE (DELTASONE) 50 MG Tab Take 50mg by mouth at 13 hours, 7 hours, and 1 hour before contrast administration.    propafenone (RHTHYMOL) 150 MG Tab Take 1 tablet (150 mg total) by mouth every 8 (eight) hours.    RABEprazole (ACIPHEX) 20 mg tablet Take 1 tablet (20 mg total) by mouth 2 (two) times daily.    REPATHA SURECLICK 140 mg/mL PnIj SMARTSI Milligram(s) Topical Every 2 Weeks    rivaroxaban (XARELTO) 20 mg Tab Take 1 tablet (20 mg total) by mouth daily with dinner or evening meal.    sildenafiL " (VIAGRA) 100 MG tablet Take 1/2 to 1 tablet by mouth one hour prior to intercourse. Max 100mg per day     Family History       Problem Relation (Age of Onset)    Heart disease Mother, Father, Brother          Tobacco Use    Smoking status: Former     Current packs/day: 0.00     Average packs/day: 0.5 packs/day for 4.0 years (2.0 ttl pk-yrs)     Types: Cigarettes     Start date: 1968     Quit date: 1972     Years since quittin.6    Smokeless tobacco: Former     Types: Chew     Quit date: 1976   Substance and Sexual Activity    Alcohol use: Yes     Alcohol/week: 2.0 standard drinks of alcohol     Types: 2 Cans of beer per week    Drug use: No    Sexual activity: Yes     Partners: Female     Review of Systems   Gastrointestinal:  Positive for blood in stool.     Objective:     Vital Signs (Most Recent):  Temp: 98.3 °F (36.8 °C) (24 1109)  Pulse: 77 (24 1109)  Resp: 18 (24 1109)  BP: 135/73 (24 1432)  SpO2: 100 % (24 1109) Vital Signs (24h Range):  Temp:  [98.3 °F (36.8 °C)] 98.3 °F (36.8 °C)  Pulse:  [77] 77  Resp:  [18] 18  SpO2:  [100 %] 100 %  BP: (131-174)/(69-80) 135/73     Weight: 103 kg (226 lb 15.4 oz)  Body mass index is 35.55 kg/m².     Physical Exam  Vitals and nursing note reviewed.   Constitutional:       Appearance: Normal appearance.   Cardiovascular:      Rate and Rhythm: Normal rate and regular rhythm.      Pulses: Normal pulses.      Heart sounds: Normal heart sounds.   Pulmonary:      Effort: Pulmonary effort is normal.      Breath sounds: Normal breath sounds. No wheezing.   Abdominal:      General: Bowel sounds are normal. There is no distension.      Palpations: Abdomen is soft.      Tenderness: There is no abdominal tenderness.   Musculoskeletal:         General: No swelling. Normal range of motion.   Skin:     General: Skin is warm and dry.   Neurological:      Mental Status: He is alert and oriented to person, place, and time.                 Significant Labs: All pertinent labs within the past 24 hours have been reviewed.    Significant Imaging: I have reviewed all pertinent imaging results/findings within the past 24 hours.

## 2024-01-16 NOTE — ASSESSMENT & PLAN NOTE
Compensated liver disease with low meld. Monitor.     MELD 3.0: 7 at 1/16/2024 11:42 AM  MELD-Na: 7 at 1/16/2024 11:42 AM  Calculated from:  Serum Creatinine: 1.0 mg/dL at 1/16/2024 11:42 AM  Serum Sodium: 139 mmol/L (Using max of 137 mmol/L) at 1/16/2024 11:42 AM  Total Bilirubin: 0.2 mg/dL (Using min of 1 mg/dL) at 1/16/2024 11:42 AM  Serum Albumin: 3.3 g/dL at 1/16/2024 11:42 AM  INR(ratio): 1.1 at 1/16/2024 11:42 AM  Age at listing (hypothetical): 78 years  Sex: Male at 1/16/2024 11:42 AM

## 2024-01-16 NOTE — ASSESSMENT & PLAN NOTE
Cirrhotic patient with black stools after recently starting Plavix and ASA.   Last dose of Plavix was this morning. Last dose Xarelto was yesterday evening.   Will consider EGD this admit.   Ok for liquid diet today.   Start Pepcid IV since patient has PPI allergy.   Monitor H/H and transfuse as indicated.

## 2024-01-16 NOTE — HPI
"The patient presented to the ER with black stools. He noticed them getting darker about a week ago and then black this morning. His history is significant for SMA stenting 01/05/2024, liver cirrhosis and HCC (tx'd w/ ablation) with small esophageal varices and gastritis. He was on Xarelto prior to his stenting but was started on ASA and Plavix after his stent was placed. He has otherwise been feeling great. His abdominal pain has resolved. He denies nausea, vomiting or issues with appetite. He denies NSAID use. In the ER, he is hemodynamically stable. Hgb 10, , INR 1.1, BUN 17 and creatinine 1.0.      EGD (12/2022): "small" esophageal varices and gastritis.   Colonoscopy (12/2023): colon polyps and ischemic ulcers which led to the recent SMA stenting.    "

## 2024-01-16 NOTE — ASSESSMENT & PLAN NOTE
- trend H&H, transfuse as needed  - Pepcid BID, allergy to PPI  - CLD, NPO 0000 for possible EGD in am

## 2024-01-16 NOTE — ASSESSMENT & PLAN NOTE
- S/p SMA stent 01/05/24 with improvement in abdominal pain.   - will need to restart ASA/plavix after egd

## 2024-01-16 NOTE — ED NOTES
Bed: Int 24  Expected date:   Expected time:   Means of arrival:   Comments:  Maria De Jesus from [facility]

## 2024-01-16 NOTE — ED NOTES
Bed: [facility] 03  Expected date:   Expected time:   Means of arrival:   Comments:

## 2024-01-16 NOTE — H&P
[facility] [place] - Emergency Dept.  [facility] Medicine  History & Physical    Patient Name: Erendira Pretty  MRN: [id number]  Patient Class: OP- Observation  Admission Date: 1/16/2024  Attending Physician: Tima Hernandez MD   Primary Care Provider: Bhupinder Callaway MD         Patient information was obtained from patient, past medical records, and ER records.     Subjective:     Principal Problem:Melena    Chief Complaint:   Chief Complaint   Patient presents with    Melena     Pt. Reports that since he has started taking blood thinners 2 weeks ago his stool has turned black.         HPI: Ms. Pretty aracely  78 year old male with a history of HCC, esophageal varices, HTN, HLD, PAF (on xarelto), CAD, BPH, DELONTE on cpap, liver cirrhosis and mesenteric artery stenosis with recent stenting of SMA (on plavix and asa) who presents to the ED with complaints of dark stools x 1 week.  He denies any BRBPR, denies hematemsis, weakness/dizziness, fever/chills, sob.  He denies any NSAID usage.   In the ED, lab work notable for Hgb 10, , INR 1.1, BUN 17 and creatinine 1.0.   Case discussed with GI, will plan for EGD in am.  Last dose of plavix/asa this am 1/16, last dose of xarelto 1/15 pm.  Patient will be admitted to observation for melena.      Code Status DNR,   Surrogate Decision maker lazaro Sales       Past Medical History:   Diagnosis Date    Aortic stenosis     Asthma     Benign prostatic hyperplasia with nocturia     Bilateral carotid artery stenosis 7/21/2021    US CAROTID BILATERAL 07/14/2021 IMPRESSION: Atherosclerosis with suspected stenosis greater than 50% at the right proximal ICA visually. Velocities are discordant and appear improved from prior. Atherosclerosis on the left with no evidence of flow-limiting stenosis greater than 50%.  FINDINGS: Right: Internal Carotid Artery (ICA): Peak systolic velocity 118 cm/sec. End diastolic velocity 35 cm/sec    BPH (benign prostatic hyperplasia)     CAD (coronary artery disease)      40% lesion 2002;lazo    Cirrhosis     Claudication 4/9/2014    Colon polyp     COPD (chronic obstructive pulmonary disease)     ED (erectile dysfunction)     Encounter for blood transfusion     Ex-smoker     Hearing loss NEC     Hepatitis C     Cured;reji brown 2015    Hyperlipidemia     Hypertension     Hypothyroid     s/p tx graves    Open angle with borderline findings and low glaucoma risk in both eyes 9/22/2020    DELONTE on CPAP     Osteoarthritis     PVD (peripheral vascular disease)     Secondary esophageal varices without bleeding 6/26/2017    Type 2 diabetes mellitus        Past Surgical History:   Procedure Laterality Date    ANGIOGRAM, EXTREMITY, UNILATERAL Left 1/4/2024    Procedure: ANGIOGRAM, EXTREMITY, UNILATERAL- Femoral / SMS Stent, Poss General;  Surgeon: ALEX Alfred III, MD;  Location: [facility] OR [address] [place] [address];  Service: Vascular;  Laterality: Left;  mGy : 2698.78  Gy.cm : 229.88  Contrast : 62ml  Fluro time : 13.8min    CARDIAC CATHETERIZATION      CARDIAC SURGERY      CARPAL TUNNEL RELEASE Left     CATARACT EXTRACTION      CATARACT EXTRACTION W/ INTRAOCULAR LENS  IMPLANT, BILATERAL  2008    CATHETERIZATION OF BOTH LEFT AND RIGHT HEART N/A 11/2/2018    Procedure: CATHETERIZATION, HEART, BOTH LEFT AND RIGHT;  Surgeon: Frank Lazo MD;  Location: [facility] CATH LAB;  Service: Cardiology;  Laterality: N/A;    COLONOSCOPY  8/2013    COLONOSCOPY N/A 5/30/2016    Procedure: COLONOSCOPY;  Surgeon: Anna Tomas MD;  Location: [facility] ENDO;  Service: Endoscopy;  Laterality: N/A;    COLONOSCOPY N/A 7/17/2019    Procedure: COLONOSCOPY;  Surgeon: David Carter III, MD;  Location: [facility] ENDO;  Service: Endoscopy;  Laterality: N/A;    COLONOSCOPY N/A 12/14/2023    Procedure: COLONOSCOPY;  Surgeon: Ama Barrera MD;  Location: [facility] ENDO;  Service: Endoscopy;  Laterality: N/A;    COMPUTED TOMOGRAPHY N/A 9/9/2019    Procedure: CT (COMPUTED TOMOGRAPHY);  Surgeon: [facility] Diagnostic Provider;  Location: [facility]  OR;  Service: General;  Laterality: N/A;  Microwave ablation to be done in CT.  Need CRNA and cart    COMPUTED TOMOGRAPHY N/A 1/25/2022    Procedure: Liver Microwave Ablation;  Surgeon: Red Puentes MD;  Location: [facility];  Service: General;  Laterality: N/A;    CORONARY ARTERY BYPASS GRAFT (CABG) N/A 11/5/2018    Procedure: CORONARY ARTERY BYPASS GRAFT (CABG);  Surgeon: Bear De Luna MD;  Location: [facility] OR;  Service: Cardiothoracic;  Laterality: N/A;  TWO VESSEL BYPASS WITH AORTOTOMY AND EXCISION OF ATHEROSCLEROTIC PLAQUE    CORONARY BYPASS GRAFT ANGIOGRAPHY  3/2/2020    Procedure: Bypass graft study;  Surgeon: Cora Cormier MD;  Location: [facility] CATH LAB;  Service: Cardiology;;    ELBOW BURSA SURGERY Right 2010    ENDOSCOPIC HARVEST OF VEIN Left 11/5/2018    Procedure: SURGICAL PROCUREMENT, VEIN, ENDOSCOPIC;  Surgeon: Bear De Luna MD;  Location: [facility] OR;  Service: Cardiothoracic;  Laterality: Left;    ESOPHAGOGASTRODUODENOSCOPY N/A 7/17/2019    Procedure: ESOPHAGOGASTRODUODENOSCOPY (EGD);  Surgeon: David Carter III, MD;  Location: [facility];  Service: Endoscopy;  Laterality: N/A;    ESOPHAGOGASTRODUODENOSCOPY N/A 12/29/2022    Procedure: ESOPHAGOGASTRODUODENOSCOPY (EGD);  Surgeon: Issa Gayle MD;  Location: [facility] ENDO;  Service: Endoscopy;  Laterality: N/A;    ETHMOIDECTOMY Bilateral 3/27/2019    Procedure: ETHMOIDECTOMY;  Surgeon: Alejandro Parkinson MD;  Location: [facility] OR;  Service: ENT;  Laterality: Bilateral;    EYE SURGERY      FOOT SURGERY      FRONTAL SINUS OBLITERATION Bilateral 3/27/2019    Procedure: SINUSOTOMY, FRONTAL SINUS, OBLITERATIVE;  Surgeon: Alejandro Parkinson MD;  Location: [facility] OR;  Service: ENT;  Laterality: Bilateral;    FUNCTIONAL ENDOSCOPIC SINUS SURGERY (FESS) Bilateral 3/27/2019    Procedure: FESS (FUNCTIONAL ENDOSCOPIC SINUS SURGERY);  Surgeon: Alejandro Parkinson MD;  Location: [facility] OR;  Service: ENT;  Laterality: Bilateral;  Left Keila bullosa resection     KNEE ARTHROSCOPY W/  MENISCAL REPAIR Left 06/2019    dr boykin    KNEE SURGERY Right     LEFT HEART CATHETERIZATION Left 3/2/2020    Procedure: CATHETERIZATION, HEART, LEFT;  Surgeon: Cora Cormier MD;  Location: [facility] CATH LAB;  Service: Cardiology;  Laterality: Left;    LIVER BIOPSY  01/2022    MAXILLARY ANTROSTOMY Bilateral 3/27/2019    Procedure: MAXILLARY ANTROSTOMY;  Surgeon: Alejandro Parkinson MD;  Location: [facility] OR;  Service: ENT;  Laterality: Bilateral;    NASAL SEPTOPLASTY N/A 3/27/2019    Procedure: SEPTOPLASTY, NOSE;  Surgeon: Alejandro Parkinson MD;  Location: [facility] OR;  Service: ENT;  Laterality: N/A;    RECTAL SURGERY  2012    STENT, SUPERIOR MESENTERIC ARTERY Left 1/4/2024    Procedure: STENT, SUPERIOR MESENTERIC ARTERY;  Surgeon: ALEX Alfred III, MD;  Location: [address];  Service: Vascular;  Laterality: Left;    TRANSURETHRAL RESECTION OF PROSTATE (TURP) WITHOUT USE OF LASER N/A 6/19/2018    Procedure: TURP, WITHOUT USING LASER;  Surgeon: Cooper Cordova IV, MD;  Location: [facility] OR;  Service: Urology;  Laterality: N/A;    TRIGGER FINGER RELEASE Left        Review of patient's allergies indicates:   Allergen Reactions    Lipitor [atorvastatin] Other (See Comments)     Muscle aches and pains as well as fatigue.     Niacin preparations Other (See Comments)     Causes broken blood vessels and bruises at 4 times normal dose.    Statins-hmg-coa reductase inhibitors Other (See Comments)     Statins cause intolerable myalgias.    Iodinated contrast media Hives     Tachycardia    Omeprazole Hives and Itching    Prilosec [omeprazole magnesium] Hives and Itching       Current Facility-Administered Medications on File Prior to Encounter   Medication    lactated ringers infusion     Current Outpatient Medications on File Prior to Encounter   Medication Sig    ACCU-CHEK GRACE PLUS METER Misc AS DIRECTED    acetaminophen (TYLENOL) 325 MG tablet Take 2 tablets (650 mg total) by mouth every 8 (eight) hours as needed for Pain.    albuterol  (VENTOLIN HFA) 90 mcg/actuation inhaler Inhale 2 puffs into the lungs every 6 (six) hours as needed for Wheezing or Shortness of Breath. Rescue    amLODIPine (NORVASC) 10 MG tablet TAKE 1 TABLET BY MOUTH ONCE A DAY (DOSE INCREASE) (Patient taking differently: Take by mouth every morning.)    aspirin (ECOTRIN) 81 MG EC tablet Take 81 mg by mouth every morning.    azelastine (ASTELIN) 137 mcg (0.1 %) nasal spray 2 sprays (274 mcg total) by Nasal route 2 (two) times daily.    blood sugar diagnostic (ACCU-CHEK GRACE PLUS TEST STRP) Strp TEST THREE TIMES A DAY    budesonide-formoterol 160-4.5 mcg (SYMBICORT) 160-4.5 mcg/actuation HFAA INHALE 2 PUFFS INTO THE LUNGS TWO TIMES A DAY.    carvediloL (COREG) 3.125 MG tablet Take 1 tablet (3.125 mg total) by mouth 2 (two) times daily with meals.    clopidogreL (PLAVIX) 75 mg tablet Take 1 tablet (75 mg total) by mouth once daily.    diphenhydrAMINE (BENADRYL) 50 MG capsule Take 50mg by mouth 1 hour before contrast administration.    empagliflozin (JARDIANCE) 25 mg tablet Take 1 tablet (25 mg total) by mouth once daily. (Patient taking differently: Take 25 mg by mouth every morning.)    famotidine (PEPCID) 40 MG tablet TAKE 1 TABLET BY MOUTH EVERY EVENING    finasteride (PROSCAR) 5 mg tablet TAKE 1 TABLET BY MOUTH once daily (Patient taking differently: Take 5 mg by mouth every evening.)    fluticasone propionate (FLONASE) 50 mcg/actuation nasal spray 2 sprays (100 mcg total) by Each Nostril route once daily.    furosemide (LASIX) 40 MG tablet Take 1 tablet (40 mg total) by mouth 2 (two) times daily.    GLUCOSAMINE HCL/CHONDR ROSARIO A NA (OSTEO BI-FLEX ORAL) Take 1 tablet by mouth 2 (two) times daily.     insulin (LANTUS SOLOSTAR U-100 INSULIN) glargine 100 units/mL SubQ pen Inject 44 Units into the skin every evening.    ipratropium (ATROVENT) 21 mcg (0.03 %) nasal spray 2 sprays by Each Nostril route 2 (two) times daily.    krill oil 500 mg Cap Take by mouth.    lancets (ACCU-CHEK  "FASTCLIX LANCET DRUM) Misc TEST TWO TIMES A DAY    levocetirizine (XYZAL) 5 MG tablet Take 1 tablet (5 mg total) by mouth every evening.    levothyroxine (SYNTHROID) 300 MCG Tab Take 1 tablet (300 mcg total) by mouth every morning.    lisinopriL 10 MG tablet TAKE 1 BY MOUTH EVERY DAY (Patient taking differently: Take 10 mg by mouth every morning.)    meloxicam (MOBIC) 15 MG tablet Take 15 mg by mouth.    metFORMIN (GLUCOPHAGE-XR) 500 MG ER 24hr tablet Take 1 tablet (500 mg total) by mouth 2 (two) times daily with meals.    montelukast (SINGULAIR) 10 mg tablet Take 1 tablet (10 mg total) by mouth once daily. (Patient taking differently: Take 10 mg by mouth every evening.)    pen needle, diabetic (BD ULTRA-FINE MINI PEN NEEDLE) 31 gauge x 3/16" Ndle USE AS DIRECTED    predniSONE (DELTASONE) 50 MG Tab Take 50mg by mouth at 13 hours, 7 hours, and 1 hour before contrast administration.    propafenone (RHTHYMOL) 150 MG Tab Take 1 tablet (150 mg total) by mouth every 8 (eight) hours.    RABEprazole (ACIPHEX) 20 mg tablet Take 1 tablet (20 mg total) by mouth 2 (two) times daily.    REPATHA SURECLICK 140 mg/mL PnIj SMARTSI Milligram(s) Topical Every 2 Weeks    rivaroxaban (XARELTO) 20 mg Tab Take 1 tablet (20 mg total) by mouth daily with dinner or evening meal.    sildenafiL (VIAGRA) 100 MG tablet Take 1/2 to 1 tablet by mouth one hour prior to intercourse. Max 100mg per day     Family History       Problem Relation (Age of Onset)    Heart disease Mother, Father, Brother          Tobacco Use    Smoking status: Former     Current packs/day: 0.00     Average packs/day: 0.5 packs/day for 4.0 years (2.0 ttl pk-yrs)     Types: Cigarettes     Start date: 1968     Quit date: 1972     Years since quittin.6    Smokeless tobacco: Former     Types: Chew     Quit date: 1976   Substance and Sexual Activity    Alcohol use: Yes     Alcohol/week: 2.0 standard drinks of alcohol     Types: 2 Cans of beer per week    " Drug use: No    Sexual activity: Yes     Partners: Female     Review of Systems   Gastrointestinal:  Positive for blood in stool.     Objective:     Vital Signs (Most Recent):  Temp: 98.3 °F (36.8 °C) (01/16/24 1109)  Pulse: 77 (01/16/24 1109)  Resp: 18 (01/16/24 1109)  BP: 135/73 (01/16/24 1432)  SpO2: 100 % (01/16/24 1109) Vital Signs (24h Range):  Temp:  [98.3 °F (36.8 °C)] 98.3 °F (36.8 °C)  Pulse:  [77] 77  Resp:  [18] 18  SpO2:  [100 %] 100 %  BP: (131-174)/(69-80) 135/73     Weight: 103 kg (226 lb 15.4 oz)  Body mass index is 35.55 kg/m².     Physical Exam  Vitals and nursing note reviewed.   Constitutional:       Appearance: Normal appearance.   Cardiovascular:      Rate and Rhythm: Normal rate and regular rhythm.      Pulses: Normal pulses.      Heart sounds: Normal heart sounds.   Pulmonary:      Effort: Pulmonary effort is normal.      Breath sounds: Normal breath sounds. No wheezing.   Abdominal:      General: Bowel sounds are normal. There is no distension.      Palpations: Abdomen is soft.      Tenderness: There is no abdominal tenderness.   Musculoskeletal:         General: No swelling. Normal range of motion.   Skin:     General: Skin is warm and dry.   Neurological:      Mental Status: He is alert and oriented to person, place, and time.                Significant Labs: All pertinent labs within the past 24 hours have been reviewed.    Significant Imaging: I have reviewed all pertinent imaging results/findings within the past 24 hours.  Assessment/Plan:     * Melena  - trend H&H, transfuse as needed  - Pepcid BID, allergy to PPI  - CLD, NPO 0000 for possible EGD in am      Mesenteric artery stenosis  - S/p SMA stent 01/05/24 with improvement in abdominal pain.   - will need to restart ASA/plavix after egd       Paroxysmal atrial fibrillation  - hx of PAF, follows with Dr. Gallardo  - jyothi, BB, Rythmol   - last dose of xarelto 1/15    Type 2 diabetes mellitus with microalbuminuria, with long-term  current use of insulin  - A1C 6.3  - BG monitoring, SSI as needed  - holding home metformin         DELONTE on CPAP  - cpap q hs      Benign prostatic hyperplasia with nocturia  - continue home finasteride         Acquired hypothyroidism  - continue home synthroid       Essential hypertension  - continue home BB and Norvasc - holding home lasix   - trend bp and adjust meds as necessary       VTE Risk Mitigation (From admission, onward)           Ordered     IP VTE HIGH RISK PATIENT  Once         01/16/24 1309     Place sequential compression device  Until discontinued         01/16/24 1309     Reason for No Pharmacological VTE Prophylaxis  Once        Question:  Reasons:  Answer:  Physician Provided (leave comment)  Comment:  planned procedure    01/16/24 1309                     On 01/16/2024, patient should be placed in hospital observation services under my care in collaboration with Dr. Hernandez.           Halina Villaseñor NP  Department of Hospital Medicine  [facility] - Emergency Dept.

## 2024-01-16 NOTE — FIRST PROVIDER EVALUATION
Medical screening examination initiated.  I have conducted a focused provider triage encounter, findings are as follows:    Brief history of present illness:  78-year-old male with complaint of black stool over the past 4-5 days.  Patient denies any weakness.  Patient denies vomiting blood.  Patient currently taking Xarelto for AFib.    Vitals:    01/16/24 1109   BP: (!) 174/80   BP Location: Right arm   Patient Position: Sitting   Pulse: 77   Resp: 18   Temp: 98.3 °F (36.8 °C)   TempSrc: Oral   SpO2: 100%   Weight: 103 kg (226 lb 15.4 oz)       Pertinent physical exam:  no abdominal tenderness

## 2024-01-16 NOTE — TELEPHONE ENCOUNTER
Per Dr. Alfred's request nurse contacted pt with instructions to discontinue Plavix and continue Xarelto and ASA. Pt verbalized understanding. States he will be admitted tonight and have endoscopy tomorrow.

## 2024-01-17 ENCOUNTER — ANESTHESIA EVENT (OUTPATIENT)
Dept: ENDOSCOPY | Facility: HOSPITAL | Age: 79
End: 2024-01-17
Payer: MEDICARE

## 2024-01-17 ENCOUNTER — ANESTHESIA (OUTPATIENT)
Dept: ENDOSCOPY | Facility: HOSPITAL | Age: 79
End: 2024-01-17
Payer: MEDICARE

## 2024-01-17 LAB
ANION GAP SERPL CALC-SCNC: 6 MMOL/L (ref 8–16)
BUN SERPL-MCNC: 14 MG/DL (ref 8–23)
CALCIUM SERPL-MCNC: 8.6 MG/DL (ref 8.7–10.5)
CHLORIDE SERPL-SCNC: 106 MMOL/L (ref 95–110)
CO2 SERPL-SCNC: 28 MMOL/L (ref 23–29)
CREAT SERPL-MCNC: 0.9 MG/DL (ref 0.5–1.4)
EST. GFR  (NO RACE VARIABLE): >60 ML/MIN/1.73 M^2
GLUCOSE SERPL-MCNC: 91 MG/DL (ref 70–110)
HCT VFR BLD AUTO: 28.6 % (ref 40–54)
HCT VFR BLD AUTO: 30.4 % (ref 40–54)
HGB BLD-MCNC: 9.3 G/DL (ref 14–18)
HGB BLD-MCNC: 9.9 G/DL (ref 14–18)
POCT GLUCOSE: 75 MG/DL (ref 70–110)
POCT GLUCOSE: 88 MG/DL (ref 70–110)
POTASSIUM SERPL-SCNC: 4 MMOL/L (ref 3.5–5.1)
SODIUM SERPL-SCNC: 140 MMOL/L (ref 136–145)

## 2024-01-17 PROCEDURE — 82962 GLUCOSE BLOOD TEST: CPT

## 2024-01-17 PROCEDURE — G0378 HOSPITAL OBSERVATION PER HR: HCPCS

## 2024-01-17 PROCEDURE — 43235 EGD DIAGNOSTIC BRUSH WASH: CPT | Mod: ,,, | Performed by: INTERNAL MEDICINE

## 2024-01-17 PROCEDURE — 37000008 HC ANESTHESIA 1ST 15 MINUTES: Performed by: INTERNAL MEDICINE

## 2024-01-17 PROCEDURE — 63600175 PHARM REV CODE 636 W HCPCS: Performed by: NURSE ANESTHETIST, CERTIFIED REGISTERED

## 2024-01-17 PROCEDURE — 25000003 PHARM REV CODE 250: Performed by: FAMILY MEDICINE

## 2024-01-17 PROCEDURE — 85014 HEMATOCRIT: CPT | Performed by: NURSE PRACTITIONER

## 2024-01-17 PROCEDURE — 80048 BASIC METABOLIC PNL TOTAL CA: CPT | Performed by: NURSE PRACTITIONER

## 2024-01-17 PROCEDURE — 85014 HEMATOCRIT: CPT | Mod: 91 | Performed by: NURSE PRACTITIONER

## 2024-01-17 PROCEDURE — 43235 EGD DIAGNOSTIC BRUSH WASH: CPT | Performed by: INTERNAL MEDICINE

## 2024-01-17 PROCEDURE — 25000003 PHARM REV CODE 250: Performed by: NURSE PRACTITIONER

## 2024-01-17 PROCEDURE — 99214 OFFICE O/P EST MOD 30 MIN: CPT | Mod: 25,,, | Performed by: INTERNAL MEDICINE

## 2024-01-17 PROCEDURE — 36415 COLL VENOUS BLD VENIPUNCTURE: CPT | Mod: XB | Performed by: NURSE PRACTITIONER

## 2024-01-17 PROCEDURE — 85018 HEMOGLOBIN: CPT | Performed by: NURSE PRACTITIONER

## 2024-01-17 PROCEDURE — 25000003 PHARM REV CODE 250: Performed by: NURSE ANESTHETIST, CERTIFIED REGISTERED

## 2024-01-17 PROCEDURE — 85018 HEMOGLOBIN: CPT | Mod: 91 | Performed by: NURSE PRACTITIONER

## 2024-01-17 RX ORDER — LEVOTHYROXINE SODIUM 150 UG/1
300 TABLET ORAL
Status: DISCONTINUED | OUTPATIENT
Start: 2024-01-17 | End: 2024-01-17 | Stop reason: HOSPADM

## 2024-01-17 RX ORDER — PROPOFOL 10 MG/ML
VIAL (ML) INTRAVENOUS
Status: DISCONTINUED | OUTPATIENT
Start: 2024-01-17 | End: 2024-01-17

## 2024-01-17 RX ORDER — LIDOCAINE HYDROCHLORIDE 10 MG/ML
INJECTION, SOLUTION EPIDURAL; INFILTRATION; INTRACAUDAL; PERINEURAL
Status: DISCONTINUED | OUTPATIENT
Start: 2024-01-17 | End: 2024-01-17

## 2024-01-17 RX ADMIN — PROPAFENONE HYDROCHLORIDE 150 MG: 150 TABLET, FILM COATED ORAL at 06:01

## 2024-01-17 RX ADMIN — LEVOTHYROXINE SODIUM 300 MCG: 150 TABLET ORAL at 10:01

## 2024-01-17 RX ADMIN — PROPOFOL 30 MG: 10 INJECTION, EMULSION INTRAVENOUS at 01:01

## 2024-01-17 RX ADMIN — SODIUM CHLORIDE, SODIUM LACTATE, POTASSIUM CHLORIDE, AND CALCIUM CHLORIDE: .6; .31; .03; .02 INJECTION, SOLUTION INTRAVENOUS at 12:01

## 2024-01-17 RX ADMIN — PROPOFOL 60 MG: 10 INJECTION, EMULSION INTRAVENOUS at 12:01

## 2024-01-17 RX ADMIN — LIDOCAINE HYDROCHLORIDE 50 MG: 10 SOLUTION INTRAVENOUS at 12:01

## 2024-01-17 NOTE — PLAN OF CARE
Pt AAOx4. Independent with ADL's. Accu checks Ac&hs. Remains free from incident/injury. Call light in reach. All active orders reviewed. Chart check complete.

## 2024-01-17 NOTE — PROGRESS NOTES
[facility] Surg  Gastroenterology  Progress Note    Patient Name: Erendira Pretty  MRN: [id number]  Admission Date: 1/16/2024  Hospital Length of Stay: 0 days  Code Status: DNR   Attending Provider: Tima Hernandez MD  Consulting Provider: Issa Gayle MD  Primary Care Physician: Bhupinder Callaway MD  Principal Problem: Melena    Subjective:     Interval History: No new complains overnight    Review of Systems  12 point review of system is negative  Objective:     Vital Signs (Most Recent):  Temp: 97.9 °F (36.6 °C) (01/17/24 0818)  Pulse: 75 (01/17/24 0818)  Resp: 16 (01/17/24 0818)  BP: (!) 152/72 (01/17/24 0818)  SpO2: 98 % (01/17/24 0818) Vital Signs (24h Range):  Temp:  [97.9 °F (36.6 °C)] 97.9 °F (36.6 °C)  Pulse:  [75-82] 75  Resp:  [16] 16  SpO2:  [98 %] 98 %  BP: ()/(55-73) 152/72     Weight: 103 kg (226 lb 15.4 oz) (01/16/24 1109)  Body mass index is 35.55 kg/m².    No intake or output data in the 24 hours ending 01/17/24 1232    Lines/Drains/Airways       Peripheral Intravenous Line  Duration                  Peripheral IV - Single Lumen 01/16/24 1253 20 G Right Antecubital <1 day                    Physical Exam  Constitutional:       General: He is not in acute distress.     Appearance: Normal appearance. He is well-developed.   HENT:      Head: Normocephalic and atraumatic.   Eyes:      Extraocular Movements: Extraocular movements intact.   Cardiovascular:      Rate and Rhythm: Normal rate and regular rhythm.      Heart sounds: Murmur heard.   Pulmonary:      Effort: Pulmonary effort is normal. No respiratory distress.      Breath sounds: Normal breath sounds. No wheezing.   Abdominal:      General: Bowel sounds are normal. There is no distension.      Palpations: Abdomen is soft. There is no mass.      Tenderness: There is no abdominal tenderness.   Musculoskeletal:      Cervical back: Normal range of motion and neck supple.      Right lower leg: No edema.      Left lower leg: No edema.    Skin:     General: Skin is warm and dry.      Findings: No rash.   Neurological:      Mental Status: He is alert and oriented to person, place, and time.      Cranial Nerves: No cranial nerve deficit.   Psychiatric:         Behavior: Behavior normal.      Significant Labs:  CBC:   Recent Labs   Lab 01/16/24  1142 01/16/24  1851 01/17/24  0341 01/17/24  0619   WBC 5.02  --   --   --    HGB 10.0* 10.5* 9.3* 9.9*   HCT 30.1* 31.8* 28.6* 30.4*     --   --   --      CMP:   Recent Labs   Lab 01/16/24  1142 01/17/24  0619   * 91   CALCIUM 8.7 8.6*   ALBUMIN 3.3*  --    PROT 7.1  --     140   K 4.2 4.0   CO2 27 28    106   BUN 17 14   CREATININE 1.0 0.9   ALKPHOS 83  --    ALT 26  --    AST 31  --    BILITOT 0.2  --      Coagulation:   Recent Labs   Lab 01/16/24  1142   INR 1.1             Assessment/Plan:     Active Diagnoses:    Diagnosis Date Noted POA    PRINCIPAL PROBLEM:  Melena [K92.1] 01/16/2024 Yes    Mesenteric artery stenosis [K55.1] 12/26/2023 Yes    Type 2 diabetes mellitus with microalbuminuria, with long-term current use of insulin [E11.29, R80.9, Z79.4] 07/21/2021 Not Applicable     Chronic    Paroxysmal atrial fibrillation [I48.0] 07/21/2021 Yes    DELONTE on CPAP [G47.33] 07/07/2015 Yes     Chronic    Benign prostatic hyperplasia with nocturia [N40.1, R35.1]  Yes     Chronic    Acquired hypothyroidism [E03.9]  Yes     Chronic    Essential hypertension [I10] 07/08/2013 Yes     Chronic    Other cirrhosis of liver [K74.69] 02/13/2013 Yes      Problems Resolved During this Admission:       Other cirrhosis of liver  Compensated liver disease with low meld. Monitor.      MELD 3.0: 7 at 1/16/2024 11:42 AM  MELD-Na: 7 at 1/16/2024 11:42 AM  Calculated from:  Serum Creatinine: 1.0 mg/dL at 1/16/2024 11:42 AM  Serum Sodium: 139 mmol/L (Using max of 137 mmol/L) at 1/16/2024 11:42 AM  Total Bilirubin: 0.2 mg/dL (Using min of 1 mg/dL) at 1/16/2024 11:42 AM  Serum Albumin: 3.3 g/dL at 1/16/2024  11:42 AM  INR(ratio): 1.1 at 1/16/2024 11:42 AM  Age at listing (hypothetical): 78 years  Sex: Male at 1/16/2024 11:42 AM           Melena  OfF  Plavix was t and Xarelto .   -EGD today  y.   -continue Pepcid IV since patient has PPI allergy.   Monitor H/H and transfuse as indicated.        Thank you for your consult. I will follow-up with patient. Please contact us if you have any additional questions.    Issa Gayle MD  Gastroenterology  [facility] - Med Surg

## 2024-01-17 NOTE — CARE UPDATE
PM team was consulted for goals of care on this patient. Patient's PCP is Dr. Daugherty in which GOC conversations have occurred and GOC established, patient is DNR/I and has a living will reflecting those wishes. Also patient's wife was on hospice as well. Patient had EGD performed today in the setting of melena, on tipple anticoagulation: Xarelto for AF, ASA and Plavix  for stent in SMA placed 1/4/24. Discussed case with attending in which PM waited for patient to have EGD completed as results would have affected is GOC changed including hospice discussion. Primary team spoke to vascular after EGD in which patient to continue ASA and xarelto and stop plavix. Discussed with attending patient to be discharged home in a stable fashion but if patient presents with GIB again may truly need goals of care or hospice discussion based on patient's wishes as patient would be at a point where he is unable to tolerate OAC. PM will be consulted if patient returns in the above listed setting.

## 2024-01-17 NOTE — ANESTHESIA PREPROCEDURE EVALUATION
01/17/2024  Erendira Pretty is a 78 y.o., male.      Pre-op Assessment    I have reviewed the Patient Summary Reports.     I have reviewed the Nursing Notes. I have reviewed the NPO Status.   I have reviewed the Medications.     Review of Systems  Anesthesia Hx:  No problems with previous Anesthesia             Denies Family Hx of Anesthesia complications.    Denies Personal Hx of Anesthesia complications.                    Social:  Former Smoker       Hematology/Oncology:       -- Anemia:                                  EENT/Dental:  EENT/Dental Normal           Cardiovascular:     Hypertension Valvular problems/Murmurs, AS  CAD   CABG/stent   Angina    PVD hyperlipidemia   ECG has been reviewed. EF 55%, julissa 1.06                         Pulmonary:   COPD, mild Asthma    Sleep Apnea, CPAP                Renal/:  Renal/ Normal                 Hepatic/GI:     GERD Liver Disease, Hepatitis, C           Musculoskeletal:  Arthritis               Neurological:    Neuromuscular Disease,                                   Endocrine:  Diabetes, type 2, using insulin Hypothyroidism        Obesity / BMI > 30  Dermatological:  Skin Normal    Psych:  Psychiatric Normal                  Physical Exam  General: Cooperative, Alert and Oriented    Airway:  Mallampati: II   Mouth Opening: Normal  TM Distance: Normal  Tongue: Normal  Neck ROM: Normal ROM    Dental:  Dentures    Chest/Lungs:  Clear to auscultation, Normal Respiratory Rate    Heart:  Rate: Normal  Rhythm: Regular Rhythm    Anesthesia Plan  Type of Anesthesia, risks & benefits discussed:    Anesthesia Type: MAC  Intra-op Monitoring Plan: Standard ASA Monitors  Induction:  IV  Informed Consent: Informed consent signed with the Patient and all parties understand the risks and agree with anesthesia plan.  All questions answered.   ASA Score: 3  Day of Surgery  Review of History & Physical: H&P Update referred to the surgeon/provider.I have interviewed and examined the patient. I have reviewed the patient's H&P dated: There are no significant changes.     Ready For Surgery From Anesthesia Perspective.     .

## 2024-01-17 NOTE — ED NOTES
Magen in pharmacy notified that synthroid was not sent to ED to be able to be given to pt, per pharmacy, to be sent to med surg.

## 2024-01-17 NOTE — PLAN OF CARE
Dr Gayle came to bedside and discussed findings. NO N/V,  no abdominal pain, no GI bleeding, and vitals stable.  Pt discharged from unit.

## 2024-01-17 NOTE — PROVATION PATIENT INSTRUCTIONS
Discharge Summary/Instructions after an Endoscopic Procedure  Patient Name: Erendira Pretty  Patient MRN: [id number]  Patient YOB: 1945 Wednesday, January 17, 2024 Issa Gayle MD  Dear patient,  As a result of recent federal legislation (The Federal Cures Act), you may   receive lab or pathology results from your procedure in your [facility]   account before your physician is able to contact you. Your physician or   their representative will relay the results to you with their   recommendations at their soonest availability.  Thank you,  RESTRICTIONS:  During your procedure today, you received medications for sedation.  These   medications may affect your judgment, balance and coordination.  Therefore,   for 24 hours, you have the following restrictions:   - DO NOT drive a car, operate machinery, make legal/financial decisions,   sign important papers or drink alcohol.    ACTIVITY:  Today: no heavy lifting, straining or running due to procedural   sedation/anesthesia.  The following day: return to full activity including work.  DIET:  Eat and drink normally unless instructed otherwise.     TREATMENT FOR COMMON SIDE EFFECTS:  - Mild abdominal pain, nausea, belching, bloating or excessive gas:  rest,   eat lightly and use a heating pad.  - Sore Throat: treat with throat lozenges and/or gargle with warm salt   water.  - Because air was used during the procedure, expelling large amounts of air   from your rectum or belching is normal.  - If a bowel prep was taken, you may not have a bowel movement for 1-3 days.    This is normal.  SYMPTOMS TO WATCH FOR AND REPORT TO YOUR PHYSICIAN:  1. Abdominal pain or bloating, other than gas cramps.  2. Chest pain.  3. Back pain.  4. Signs of infection such as: chills or fever occurring within 24 hours   after the procedure.  5. Rectal bleeding, which would show as bright red, maroon, or black stools.   (A tablespoon of blood from the rectum is not serious, especially  if   hemorrhoids are present.)  6. Vomiting.  7. Weakness or dizziness.  GO DIRECTLY TO THE NEAREST EMERGENCY ROOM IF YOU HAVE ANY OF THE FOLLOWING:      Difficulty breathing              Chills and/or fever over 101 F   Persistent vomiting and/or vomiting blood   Severe abdominal pain   Severe chest pain   Black, tarry stools   Bleeding- more than one tablespoon   Any other symptom or condition that you feel may need urgent attention  Your doctor recommends these additional instructions:  If any biopsies were taken, your doctors clinic will contact you in 1 to 2   weeks with any results.  - [facility] patient to [facility].   - Advance diet as tolerated if HnH remains stable  - Continue present medications.  For questions, problems or results please call your physician Issa Gayle MD at Work:  [phone]  If you have any questions about the above instructions, call the GI   department at [phone] or call the endoscopy unit at [phone]   from 7am until 3 pm.  [facility] [place], EMERGENCY ROOM PHONE NUMBER:   [phone]  IF A COMPLICATION OR EMERGENCY SITUATION ARISES AND YOU ARE UNABLE TO REACH   YOUR PHYSICIAN - GO DIRECTLY TO THE EMERGENCY ROOM.  I have read or have had read to me these discharge instructions for my   procedure and have received a written copy.  I understand these   instructions and will follow-up with my physician if I have any questions.     __________________________________       _____________________________________  Nurse Signature                                          Patient/Designated   Responsible Party Signature  MD Issa Duncan MD  1/17/2024 1:06:06 PM  This report has been verified and signed electronically.  Dear patient,  As a result of recent federal legislation (The Federal Cures Act), you may   receive lab or pathology results from your procedure in your [facility]   account before  your physician is able to contact you. Your physician or   their representative will relay the results to you with their   recommendations at their soonest availability.  Thank you,  PROVATION

## 2024-01-17 NOTE — HOSPITAL COURSE
Mr. Pretty is a 78 year old male who was admitted to [facility] with complaints of melena.  He was evaulated by GI, recommeded EGD which he completed this afternoon.  Discussed case with GI, No bleeding noted, ok to d/c to home.  Hgb remained stable.  Patient seen and examined day of discharge and deemed suirtable to discharge to home.  He will follow up with GI and PCP.  Of note, patient is on xarleto for PAF.  He also underwent recent stenting of SMA in [place] per Dr. Norton on 1/4.  Reached out to office regarding anticoagulation, per vascular ok to continue ASA and xarelto, will stop Plavix at this time.  Discussed with patient who verbalized understanding.

## 2024-01-17 NOTE — ED NOTES
Spoke with MD David about medication order versus NPO order, per MD to give only synthroid with sip of water and hold rest of meds.

## 2024-01-17 NOTE — INTERVAL H&P NOTE
The patient has been examined and the H&P has been reviewed:    I concur with the findings and no changes have occurred since H&P was written.    Anesthesia/Surgery risks, benefits and alternative options discussed and understood by patient/family.          Active Hospital Problems    Diagnosis  POA    *Melena [K92.1]  Yes    Mesenteric artery stenosis [K55.1]  Yes    Type 2 diabetes mellitus with microalbuminuria, with long-term current use of insulin [E11.29, R80.9, Z79.4]  Not Applicable     Chronic    Paroxysmal atrial fibrillation [I48.0]  Yes    DELONTE on CPAP [G47.33]  Yes     Chronic      CPAP 11 cm with optimal control AHI: 2.6  Nasal wisp mask  HME: [facility]         Benign prostatic hyperplasia with nocturia [N40.1, R35.1]  Yes     Chronic    Acquired hypothyroidism [E03.9]  Yes     Chronic    Essential hypertension [I10]  Yes     Chronic    Other cirrhosis of liver [K74.69]  Yes      Resolved Hospital Problems   No resolved problems to display.

## 2024-01-17 NOTE — ED NOTES
Assumed care of pt at this time. Pt resting in ED stretcher on cardiac monitor. Skin warm and dry, RR even and unlabored. VSS. NADN. Pt updated on POC.

## 2024-01-17 NOTE — ANESTHESIA POSTPROCEDURE EVALUATION
Anesthesia Post Evaluation    Patient: Erendira Pretty    Procedure(s) Performed: Procedure(s) (LRB):  EGD (ESOPHAGOGASTRODUODENOSCOPY) (N/A)    Final Anesthesia Type: MAC      Patient location during evaluation: PACU  Patient participation: Yes- Able to Participate  Level of consciousness: awake and alert  Post-procedure vital signs: reviewed and stable  Pain management: adequate  Airway patency: patent    PONV status at discharge: No PONV  Anesthetic complications: no      Cardiovascular status: blood pressure returned to baseline  Respiratory status: unassisted and room air  Hydration status: euvolemic  Follow-up not needed.              Vitals Value Taken Time   BP  01/17/24 1312   Temp  01/17/24 1312   Pulse  01/17/24 1312   Resp  01/17/24 1312   SpO2  01/17/24 1312         No case tracking events are documented in the log.      Pain/Natalie Score: No data recorded

## 2024-01-17 NOTE — TRANSFER OF CARE
Anesthesia Transfer of Care Note    Patient: Erendira Pretty    Procedure(s) Performed: Procedure(s) (LRB):  EGD (ESOPHAGOGASTRODUODENOSCOPY) (N/A)    Patient location: PACU    Anesthesia Type: MAC    Transport from OR: Transported from OR on room air with adequate spontaneous ventilation    Post pain: adequate analgesia    Post assessment: no apparent anesthetic complications    Post vital signs: stable    Level of consciousness: responds to stimulation    Nausea/Vomiting: no nausea/vomiting    Complications: none    Transfer of care protocol was followed      Last vitals: Visit Vitals  BP (!) 143/76 (BP Location: Left arm, Patient Position: Lying)   Pulse 75   Temp 36.4 °C (97.6 °F)   Resp 20   Wt 103 kg (226 lb 15.4 oz)   SpO2 97%   BMI 35.55 kg/m²

## 2024-01-17 NOTE — DISCHARGE SUMMARY
[facility] Medicine  Discharge Summary      Patient Name: Erendira Pretty  MRN: [id number]  LLOYD: [id number]  Patient Class: OP- Observation  Admission Date: 1/16/2024  Hospital Length of Stay: 0 days  Discharge Date and Time:  01/17/2024 3:38 PM  Attending Physician: No att. providers found   Discharging Provider: Halina Villaseñor NP  Primary Care Provider: Bhupinder Callaway MD    Primary Care Team: Networked reference to record PCT     HPI:   Ms. Pretty aracely  78 year old male with a history of HCC, esophageal varices, HTN, HLD, PAF (on xarelto), CAD, BPH, DELONTE on cpap, liver cirrhosis and mesenteric artery stenosis with recent stenting of SMA (on plavix and asa) who presents to the ED with complaints of dark stools x 1 week.  He denies any BRBPR, denies hematemsis, weakness/dizziness, fever/chills, sob.  He denies any NSAID usage.   In the ED, lab work notable for Hgb 10, , INR 1.1, BUN 17 and creatinine 1.0.   Case discussed with GI, will plan for EGD in am.  Last dose of plavix/asa this am 1/16, last dose of xarelto 1/15 pm.  Patient will be admitted to observation for melena.      Code Status DNR,   Surrogate Decision maker lazaro Sales       Procedure(s) (LRB):  EGD (ESOPHAGOGASTRODUODENOSCOPY) (N/A)      Hospital Course:   Mr. Pretty is a 78 year old male who was admitted to [facility] with complaints of melena.  He was evaulated by GI, recommeded EGD which he completed this afternoon.  Discussed case with GI, No bleeding noted, ok to d/c to home.  Hgb remained stable.  Patient seen and examined day of discharge and deemed suirtable to discharge to home.  He will follow up with GI and PCP.  Of note, patient is on xarleto for PAF.  He also underwent recent stenting of SMA in [place] per Dr. Norton on 1/4.  Reached out to office regarding anticoagulation, per vascular ok to continue ASA and xarelto, will stop Plavix at this time.  Discussed with patient who verbalized understanding.        Goals of Care Treatment Preferences:  Code Status: DNR    Living Will: Yes              Consults:   Consults (From admission, onward)          Status Ordering Provider     Inpatient consult to Palliative Care  Once        Provider:  Rosie Costa NP    Acknowledged BOOGIE SUE     Inpatient consult to Gastroenterology  Once        Provider:  (Not yet assigned)    Completed TATE CHUNG            No new Assessment & Plan notes have been filed under this hospital service since the last note was generated.  Service: Hospital Medicine    Final Active Diagnoses:    Diagnosis Date Noted POA    PRINCIPAL PROBLEM:  Melena [K92.1] 01/16/2024 Yes    Mesenteric artery stenosis [K55.1] 12/26/2023 Yes    Type 2 diabetes mellitus with microalbuminuria, with long-term current use of insulin [E11.29, R80.9, Z79.4] 07/21/2021 Not Applicable     Chronic    Paroxysmal atrial fibrillation [I48.0] 07/21/2021 Yes    DELONTE on CPAP [G47.33] 07/07/2015 Yes     Chronic    Benign prostatic hyperplasia with nocturia [N40.1, R35.1]  Yes     Chronic    Acquired hypothyroidism [E03.9]  Yes     Chronic    Essential hypertension [I10] 07/08/2013 Yes     Chronic    Other cirrhosis of liver [K74.69] 02/13/2013 Yes      Problems Resolved During this Admission:       Discharged Condition: good    Disposition: Home or Self Care    Follow Up:   Follow-up Information       Bhupinder Callaway MD. Schedule an appointment as soon as possible for a visit in 3 day(s).    Specialties: Internal Medicine, Pediatrics  Contact information:  [address]  [place] [address] [phone]                           Patient Instructions:   No discharge procedures on file.    Significant Diagnostic Studies: Labs: CMP   Recent Labs   Lab 01/16/24  1142 01/17/24  0619    140   K 4.2 4.0    106   CO2 27 28   * 91   BUN 17 14   CREATININE 1.0 0.9   CALCIUM 8.7 8.6*   PROT 7.1  --    ALBUMIN 3.3*  --    BILITOT 0.2  --     ALKPHOS 83  --    AST 31  --    ALT 26  --    ANIONGAP 7* 6*    and CBC   Recent Labs   Lab 01/16/24  1142 01/16/24  1851 01/17/24  0341 01/17/24  0619   WBC 5.02  --   --   --    HGB 10.0* 10.5* 9.3* 9.9*   HCT 30.1* 31.8* 28.6* 30.4*     --   --   --        Pending Diagnostic Studies:       None           Medications:  Reconciled Home Medications:      Medication List        CHANGE how you take these medications      amLODIPine 10 MG tablet  Commonly known as: NORVASC  TAKE 1 TABLET BY MOUTH ONCE A DAY (DOSE INCREASE)  What changed: See the new instructions.     empagliflozin 25 mg tablet  Commonly known as: JARDIANCE  Take 1 tablet (25 mg total) by mouth once daily.  What changed: when to take this     finasteride 5 mg tablet  Commonly known as: PROSCAR  TAKE 1 TABLET BY MOUTH once daily  What changed: when to take this     lisinopriL 10 MG tablet  TAKE 1 BY MOUTH EVERY DAY  What changed: when to take this     montelukast 10 mg tablet  Commonly known as: SINGULAIR  Take 1 tablet (10 mg total) by mouth once daily.  What changed: when to take this            CONTINUE taking these medications      ACCU-CHEK GRACE PLUS METER Misc  Generic drug: blood-glucose meter  AS DIRECTED     acetaminophen 325 MG tablet  Commonly known as: TYLENOL  Take 2 tablets (650 mg total) by mouth every 8 (eight) hours as needed for Pain.     albuterol 90 mcg/actuation inhaler  Commonly known as: VENTOLIN HFA  Inhale 2 puffs into the lungs every 6 (six) hours as needed for Wheezing or Shortness of Breath. Rescue     aspirin 81 MG EC tablet  Commonly known as: ECOTRIN  Take 81 mg by mouth every morning.     azelastine 137 mcg (0.1 %) nasal spray  Commonly known as: ASTELIN  2 sprays (274 mcg total) by Nasal route 2 (two) times daily.     blood sugar diagnostic Strp  Commonly known as: ACCU-CHEK GRACE PLUS TEST STRP  TEST THREE TIMES A DAY     budesonide-formoterol 160-4.5 mcg 160-4.5 mcg/actuation Hfaa  Commonly known as:  "SYMBICORT  INHALE 2 PUFFS INTO THE LUNGS TWO TIMES A DAY.     carvediloL 3.125 MG tablet  Commonly known as: COREG  Take 1 tablet (3.125 mg total) by mouth 2 (two) times daily with meals.     diphenhydrAMINE 50 MG capsule  Commonly known as: BENADRYL  Take 50mg by mouth 1 hour before contrast administration.     famotidine 40 MG tablet  Commonly known as: PEPCID  TAKE 1 TABLET BY MOUTH EVERY EVENING     fluticasone propionate 50 mcg/actuation nasal spray  Commonly known as: FLONASE  2 sprays (100 mcg total) by Each Nostril route once daily.     furosemide 40 MG tablet  Commonly known as: LASIX  Take 1 tablet (40 mg total) by mouth 2 (two) times daily.     insulin glargine 100 units/mL SubQ pen  Commonly known as: LANTUS SOLOSTAR U-100 INSULIN  Inject 44 Units into the skin every evening.     ipratropium 21 mcg (0.03 %) nasal spray  Commonly known as: ATROVENT  2 sprays by Each Nostril route 2 (two) times daily.     krill oil 500 mg Cap  Take by mouth.     lancets Misc  Commonly known as: ACCU-CHEK FASTCLIX LANCET DRUM  TEST TWO TIMES A DAY     levocetirizine 5 MG tablet  Commonly known as: XYZAL  Take 1 tablet (5 mg total) by mouth every evening.     levothyroxine 300 MCG Tab  Commonly known as: SYNTHROID  Take 1 tablet (300 mcg total) by mouth every morning.     meloxicam 15 MG tablet  Commonly known as: MOBIC  Take 15 mg by mouth.     metFORMIN 500 MG ER 24hr tablet  Commonly known as: GLUCOPHAGE-XR  Take 1 tablet (500 mg total) by mouth 2 (two) times daily with meals.     OSTEO BI-FLEX ORAL  Take 1 tablet by mouth 2 (two) times daily.     pen needle, diabetic 31 gauge x 3/16" Ndle  Commonly known as: BD ULTRA-FINE MINI PEN NEEDLE  USE AS DIRECTED     propafenone 150 MG Tab  Commonly known as: RHTHYMOL  Take 1 tablet (150 mg total) by mouth every 8 (eight) hours.     RABEprazole 20 mg tablet  Commonly known as: ACIPHEX  Take 1 tablet (20 mg total) by mouth 2 (two) times daily.     REPATHA SURECLICK 140 mg/mL " Pnij  Generic drug: evolocumab  SMARTSI Milligram(s) Topical Every 2 Weeks     rivaroxaban 20 mg Tab  Commonly known as: XARELTO  Take 1 tablet (20 mg total) by mouth daily with dinner or evening meal.     sildenafiL 100 MG tablet  Commonly known as: VIAGRA  Take 1/2 to 1 tablet by mouth one hour prior to intercourse. Max 100mg per day            STOP taking these medications      clopidogreL 75 mg tablet  Commonly known as: PLAVIX              Indwelling Lines/Drains at time of discharge:   Lines/Drains/Airways       None                   Time spent on the discharge of patient: >30 minutes         Halina Villaseñor NP  Department of Hospital Medicine  [facility] - Med Surg

## 2024-01-18 ENCOUNTER — TELEPHONE (OUTPATIENT)
Dept: INTERNAL MEDICINE | Facility: CLINIC | Age: 79
End: 2024-01-18
Payer: MEDICARE

## 2024-01-18 VITALS
WEIGHT: 226.94 LBS | BODY MASS INDEX: 35.55 KG/M2 | HEART RATE: 78 BPM | DIASTOLIC BLOOD PRESSURE: 70 MMHG | RESPIRATION RATE: 18 BRPM | TEMPERATURE: 98 F | SYSTOLIC BLOOD PRESSURE: 124 MMHG | OXYGEN SATURATION: 97 %

## 2024-01-18 DIAGNOSIS — J32.4 CHRONIC PANSINUSITIS: ICD-10-CM

## 2024-01-18 RX ORDER — MONTELUKAST SODIUM 10 MG/1
10 TABLET ORAL DAILY
Qty: 90 TABLET | Refills: 3 | Status: CANCELLED | OUTPATIENT
Start: 2024-01-18 | End: 2025-01-17

## 2024-01-18 NOTE — TELEPHONE ENCOUNTER
Spoke with patient and assisted him in scheduling the first available appointment for a hospital follow up with Dr. Callaway. He verbalized understanding.

## 2024-01-18 NOTE — TELEPHONE ENCOUNTER
----- Message from Claudia Machado sent at 1/18/2024 10:13 AM CST -----  Contact: Erendira Krishna is calling to speak to the nurse regarding a hospital follow up appointment for Monday 01/22 I tried to offer next available he want Monday. Please give him a call at [phone]    Thanks  LJ

## 2024-01-18 NOTE — TELEPHONE ENCOUNTER
----- Message from Claudia Machado sent at 1/18/2024 10:08 AM CST -----  Contact: Erendira Krishna is calling to speak to the nurse regarding a refill request on the medication montelukast (SINGULAIR) 10 mg tablet, patient is still using the same pharmacy, if any questions please give him a call at [phone]    Thanks  LJ

## 2024-01-23 ENCOUNTER — LAB VISIT (OUTPATIENT)
Dept: LAB | Facility: HOSPITAL | Age: 79
End: 2024-01-23
Attending: SURGERY
Payer: MEDICARE

## 2024-01-23 ENCOUNTER — HOSPITAL ENCOUNTER (OUTPATIENT)
Dept: VASCULAR SURGERY | Facility: CLINIC | Age: 79
Discharge: HOME | End: 2024-01-23
Attending: SURGERY
Payer: MEDICARE

## 2024-01-23 ENCOUNTER — TELEPHONE (OUTPATIENT)
Dept: INTERNAL MEDICINE | Facility: CLINIC | Age: 79
End: 2024-01-23
Payer: MEDICARE

## 2024-01-23 ENCOUNTER — OFFICE VISIT (OUTPATIENT)
Dept: VASCULAR SURGERY | Facility: CLINIC | Age: 79
End: 2024-01-23
Attending: SURGERY
Payer: MEDICARE

## 2024-01-23 VITALS
BODY MASS INDEX: 35.6 KG/M2 | SYSTOLIC BLOOD PRESSURE: 141 MMHG | WEIGHT: 227.31 LBS | DIASTOLIC BLOOD PRESSURE: 68 MMHG | TEMPERATURE: 98 F | HEART RATE: 75 BPM

## 2024-01-23 DIAGNOSIS — K55.1 MESENTERIC ARTERY STENOSIS: Primary | ICD-10-CM

## 2024-01-23 DIAGNOSIS — K55.1 MESENTERIC ARTERY STENOSIS: ICD-10-CM

## 2024-01-23 DIAGNOSIS — K92.1 MELENA: Primary | ICD-10-CM

## 2024-01-23 LAB
ANION GAP SERPL CALC-SCNC: 9 MMOL/L (ref 8–16)
BASOPHILS # BLD AUTO: 0.04 K/UL (ref 0–0.2)
BASOPHILS NFR BLD: 0.8 % (ref 0–1.9)
BUN SERPL-MCNC: 21 MG/DL (ref 8–23)
CALCIUM SERPL-MCNC: 9.1 MG/DL (ref 8.7–10.5)
CHLORIDE SERPL-SCNC: 106 MMOL/L (ref 95–110)
CO2 SERPL-SCNC: 24 MMOL/L (ref 23–29)
CREAT SERPL-MCNC: 1 MG/DL (ref 0.5–1.4)
DIFFERENTIAL METHOD BLD: ABNORMAL
EOSINOPHIL # BLD AUTO: 0.1 K/UL (ref 0–0.5)
EOSINOPHIL NFR BLD: 2.7 % (ref 0–8)
ERYTHROCYTE [DISTWIDTH] IN BLOOD BY AUTOMATED COUNT: 14.3 % (ref 11.5–14.5)
EST. GFR  (NO RACE VARIABLE): >60 ML/MIN/1.73 M^2
GLUCOSE SERPL-MCNC: 126 MG/DL (ref 70–110)
HCT VFR BLD AUTO: 27.8 % (ref 40–54)
HGB BLD-MCNC: 8.7 G/DL (ref 14–18)
IMM GRANULOCYTES # BLD AUTO: 0.01 K/UL (ref 0–0.04)
IMM GRANULOCYTES NFR BLD AUTO: 0.2 % (ref 0–0.5)
LYMPHOCYTES # BLD AUTO: 1.3 K/UL (ref 1–4.8)
LYMPHOCYTES NFR BLD: 27.9 % (ref 18–48)
MCH RBC QN AUTO: 28.3 PG (ref 27–31)
MCHC RBC AUTO-ENTMCNC: 31.3 G/DL (ref 32–36)
MCV RBC AUTO: 91 FL (ref 82–98)
MONOCYTES # BLD AUTO: 0.6 K/UL (ref 0.3–1)
MONOCYTES NFR BLD: 13 % (ref 4–15)
NEUTROPHILS # BLD AUTO: 2.6 K/UL (ref 1.8–7.7)
NEUTROPHILS NFR BLD: 55.4 % (ref 38–73)
NRBC BLD-RTO: 0 /100 WBC
PLATELET # BLD AUTO: 201 K/UL (ref 150–450)
PMV BLD AUTO: 9.4 FL (ref 9.2–12.9)
POTASSIUM SERPL-SCNC: 4.7 MMOL/L (ref 3.5–5.1)
RBC # BLD AUTO: 3.07 M/UL (ref 4.6–6.2)
SODIUM SERPL-SCNC: 139 MMOL/L (ref 136–145)
WBC # BLD AUTO: 4.76 K/UL (ref 3.9–12.7)

## 2024-01-23 PROCEDURE — 1111F DSCHRG MED/CURRENT MED MERGE: CPT | Mod: CPTII,S$GLB,, | Performed by: SURGERY

## 2024-01-23 PROCEDURE — 1157F ADVNC CARE PLAN IN RCRD: CPT | Mod: CPTII,S$GLB,, | Performed by: SURGERY

## 2024-01-23 PROCEDURE — 1126F AMNT PAIN NOTED NONE PRSNT: CPT | Mod: CPTII,S$GLB,, | Performed by: SURGERY

## 2024-01-23 PROCEDURE — 99215 OFFICE O/P EST HI 40 MIN: CPT | Mod: S$GLB,,, | Performed by: SURGERY

## 2024-01-23 PROCEDURE — 3288F FALL RISK ASSESSMENT DOCD: CPT | Mod: CPTII,S$GLB,, | Performed by: SURGERY

## 2024-01-23 PROCEDURE — 93975 VASCULAR STUDY: CPT | Mod: S$GLB,,, | Performed by: SURGERY

## 2024-01-23 PROCEDURE — 85025 COMPLETE CBC W/AUTO DIFF WBC: CPT | Performed by: SURGERY

## 2024-01-23 PROCEDURE — 80048 BASIC METABOLIC PNL TOTAL CA: CPT | Performed by: SURGERY

## 2024-01-23 PROCEDURE — 1159F MED LIST DOCD IN RCRD: CPT | Mod: CPTII,S$GLB,, | Performed by: SURGERY

## 2024-01-23 PROCEDURE — 1101F PT FALLS ASSESS-DOCD LE1/YR: CPT | Mod: CPTII,S$GLB,, | Performed by: SURGERY

## 2024-01-23 PROCEDURE — 3078F DIAST BP <80 MM HG: CPT | Mod: CPTII,S$GLB,, | Performed by: SURGERY

## 2024-01-23 PROCEDURE — 99999 PR PBB SHADOW E&M-EST. PATIENT-LVL III: CPT | Mod: PBBFAC,,, | Performed by: SURGERY

## 2024-01-23 PROCEDURE — 36415 COLL VENOUS BLD VENIPUNCTURE: CPT | Performed by: SURGERY

## 2024-01-23 PROCEDURE — 3077F SYST BP >= 140 MM HG: CPT | Mod: CPTII,S$GLB,, | Performed by: SURGERY

## 2024-01-23 PROCEDURE — 1160F RVW MEDS BY RX/DR IN RCRD: CPT | Mod: CPTII,S$GLB,, | Performed by: SURGERY

## 2024-01-23 PROCEDURE — 3072F LOW RISK FOR RETINOPATHY: CPT | Mod: CPTII,S$GLB,, | Performed by: SURGERY

## 2024-01-23 NOTE — TELEPHONE ENCOUNTER
Patient chart received from vascular surgery. Melena and worsening anemia.Had EGD and colonscopy. Will need tomorrow(sees me tomorrow) and Friday to see if CBC worsening. May need video capsule. Staff to contact patient. If over interval time, melena worsens or lightheaded/weak, he is to present urgently to ER.

## 2024-01-23 NOTE — PROGRESS NOTES
VASCULAR SURGERY SERVICE    REFERRING DOCTOR: Ama Barrera MDRef Provider     CHIEF COMPLAINT:  Chronic mesenteric ischemia    HISTORY OF PRESENT ILLNESS: Erendira Pretty is a 78 y.o. male near [place], [place], with a history of paroxysmal AFib on Xarelto, with history of hepatocellular carcinoma status post ablation, who has a 1-2 month history fairly severe upper abdominal/epigastric pain which he states typically happens 5-6 hours after his dinner meal typically is associated with significant diarrhea.  This is not happen every evening but 2-3 times per week.  He denies any postprandial pain immediately after eating common and denies similar events eating breakfast or lunch.  He says he has a good appetite generally.  However he does admit to a 10 lb weight loss over the last 4-6 weeks.    Recent colonoscopy demonstrated scattered ulcerations in the cecum and ascending colon thought to be consistent with ischemic etiology.  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  He was also begun aspirin 81 mg daily in addition to his Xarelto.    He is status post CABG in 2018.  Denies any chest pain shortness of breath    Bilateral shoulder issues which he brought up when I discussed with him that during his procedure we may want him to have his arms above his head    1/23/2024:  This is his initial follow-up status post covered stent placement, [facility] 1/4/2024.  This is done via a left common femoral artery approach.   He reports complete resolution of his postprandial pain and diarrhea now can eat limited amount is very pleased with this.    However, he subsequently developed melena proximally 1 week ago and was seen the [facility] where his hemoglobin was 9.9.  EGD was unrevealing.  At that time he was on aspirin and Plavix as well as  Xarelto (for atrial fibrillation).  His Plavix was stopped he was maintained on aspirin and Xarelto.  Patient reports that his stool color has become less unless black over the last week.    However, since his brief hospitalization 1 week ago, he is now reporting very short distance bilateral thigh claudication in wearing this which he is never experienced.  Prior to this he would walk 2 miles a day and now can only walk a very short distance.  Denies any pain in his legs or feet at rest.    Past Medical History:   Diagnosis Date    Anticoagulant long-term use     Aortic stenosis     Asthma     Benign prostatic hyperplasia with nocturia     Bilateral carotid artery stenosis 07/21/2021     CAROTID BILATERAL 07/14/2021 IMPRESSION: Atherosclerosis with suspected stenosis greater than 50% at the right proximal ICA visually. Velocities are discordant and appear improved from prior. Atherosclerosis on the left with no evidence of flow-limiting stenosis greater than 50%.  FINDINGS: Right: Internal Carotid Artery (ICA): Peak systolic velocity 118 cm/sec. 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 09/22/2020    DELONTE on CPAP     Osteoarthritis     Paroxysmal atrial fibrillation     PVD (peripheral vascular disease)     Secondary esophageal varices without bleeding 06/26/2017    Type 2 diabetes mellitus        Past Surgical History:   Procedure Laterality Date    ANGIOGRAM, EXTREMITY, UNILATERAL Left 1/4/2024    Procedure: ANGIOGRAM, EXTREMITY,  UNILATERAL- Femoral / SMS Stent, Poss General;  Surgeon: ALEX Alfred III, MD;  Location: [facility] OR 2ND FLR;  Service: Vascular;  Laterality: Left;  mGy : 2698.78  Gy.cm : 229.88  Contrast : 62ml  Fluro time : 13.8min    CARDIAC CATHETERIZATION      CARDIAC SURGERY      CARPAL TUNNEL RELEASE Left     CATARACT EXTRACTION      CATARACT EXTRACTION W/ INTRAOCULAR LENS  IMPLANT, BILATERAL  2008    CATHETERIZATION OF BOTH LEFT AND RIGHT HEART N/A 11/2/2018    Procedure: CATHETERIZATION, HEART, BOTH LEFT AND RIGHT;  Surgeon: Frank Gallardo MD;  Location: [facility] CATH LAB;  Service: Cardiology;  Laterality: N/A;    COLONOSCOPY  8/2013    COLONOSCOPY N/A 5/30/2016    Procedure: COLONOSCOPY;  Surgeon: Anna Tomas MD;  Location: [facility] ENDO;  Service: Endoscopy;  Laterality: N/A;    COLONOSCOPY N/A 7/17/2019    Procedure: COLONOSCOPY;  Surgeon: David Carter III, MD;  Location: [facility] ENDO;  Service: Endoscopy;  Laterality: N/A;    COLONOSCOPY N/A 12/14/2023    Procedure: COLONOSCOPY;  Surgeon: Ama Barrera MD;  Location: [facility] ENDO;  Service: Endoscopy;  Laterality: N/A;    COMPUTED TOMOGRAPHY N/A 9/9/2019    Procedure: CT (COMPUTED TOMOGRAPHY);  Surgeon: [facility] Provider;  Location: [facility] OR;  Service: General;  Laterality: N/A;  Microwave ablation to be done in CT.  Need CRNA and cart    COMPUTED TOMOGRAPHY N/A 1/25/2022    Procedure: Liver Microwave Ablation;  Surgeon: Red Puentes MD;  Location: [facility];  Service: General;  Laterality: N/A;    CORONARY ARTERY BYPASS GRAFT (CABG) N/A 11/5/2018    Procedure: CORONARY ARTERY BYPASS GRAFT (CABG);  Surgeon: Bear De Luna MD;  Location: [facility] OR;  Service: Cardiothoracic;  Laterality: N/A;  TWO VESSEL BYPASS WITH AORTOTOMY AND EXCISION OF ATHEROSCLEROTIC PLAQUE    CORONARY BYPASS GRAFT ANGIOGRAPHY  3/2/2020    Procedure: Bypass graft study;  Surgeon: Cora Cormier MD;  Location: [facility] CATH LAB;  Service: Cardiology;;    ELBOW BURSA SURGERY Right 2010     ENDOSCOPIC HARVEST OF VEIN Left 11/5/2018    Procedure: SURGICAL PROCUREMENT, VEIN, ENDOSCOPIC;  Surgeon: Bear De Luna MD;  Location: [facility] OR;  Service: Cardiothoracic;  Laterality: Left;    ESOPHAGOGASTRODUODENOSCOPY N/A 7/17/2019    Procedure: ESOPHAGOGASTRODUODENOSCOPY (EGD);  Surgeon: David Carter III, MD;  Location: [facility] ENDO;  Service: Endoscopy;  Laterality: N/A;    ESOPHAGOGASTRODUODENOSCOPY N/A 12/29/2022    Procedure: ESOPHAGOGASTRODUODENOSCOPY (EGD);  Surgeon: Issa Gayle MD;  Location: [facility] ENDO;  Service: Endoscopy;  Laterality: N/A;    ESOPHAGOGASTRODUODENOSCOPY N/A 1/17/2024    Procedure: EGD (ESOPHAGOGASTRODUODENOSCOPY);  Surgeon: Issa Gayle MD;  Location: [facility] ENDO;  Service: Endoscopy;  Laterality: N/A;    ETHMOIDECTOMY Bilateral 3/27/2019    Procedure: ETHMOIDECTOMY;  Surgeon: Alejandro Parkinson MD;  Location: [facility] OR;  Service: ENT;  Laterality: Bilateral;    EYE SURGERY      FOOT SURGERY      FRONTAL SINUS OBLITERATION Bilateral 3/27/2019    Procedure: SINUSOTOMY, FRONTAL SINUS, OBLITERATIVE;  Surgeon: Alejandro Parkinson MD;  Location: [facility] OR;  Service: ENT;  Laterality: Bilateral;    FUNCTIONAL ENDOSCOPIC SINUS SURGERY (FESS) Bilateral 3/27/2019    Procedure: FESS (FUNCTIONAL ENDOSCOPIC SINUS SURGERY);  Surgeon: Alejandro Parkinson MD;  Location: [facility] OR;  Service: ENT;  Laterality: Bilateral;  Left Keila bullosa resection     KNEE ARTHROSCOPY W/ MENISCAL REPAIR Left 06/2019    dr boykin    KNEE SURGERY Right     LEFT HEART CATHETERIZATION Left 3/2/2020    Procedure: CATHETERIZATION, HEART, LEFT;  Surgeon: Cora Cormier MD;  Location: [facility] CATH LAB;  Service: Cardiology;  Laterality: Left;    LIVER BIOPSY  01/2022    MAXILLARY ANTROSTOMY Bilateral 3/27/2019    Procedure: MAXILLARY ANTROSTOMY;  Surgeon: Alejandro Parkinson MD;  Location: [facility] OR;  Service: ENT;  Laterality: Bilateral;    NASAL SEPTOPLASTY N/A 3/27/2019    Procedure: SEPTOPLASTY, NOSE;  Surgeon: Alejandro Parkinson MD;  Location: [facility]  OR;  Service: ENT;  Laterality: N/A;    RECTAL SURGERY  2012    STENT, SUPERIOR MESENTERIC ARTERY Left 1/4/2024    Procedure: STENT, SUPERIOR MESENTERIC ARTERY;  Surgeon: ALEX Alfred III, MD;  [facility];  Service: Vascular;  Laterality: Left;    TRANSURETHRAL RESECTION OF PROSTATE (TURP) WITHOUT USE OF LASER N/A 6/19/2018    Procedure: TURP, WITHOUT USING LASER;  Surgeon: Cooper Cordova IV, MD;  Location: [facility] OR;  Service: Urology;  Laterality: N/A;    TRIGGER FINGER RELEASE Left          Current Outpatient Medications:     ACCU-CHEK GRACE PLUS METER Misc, AS DIRECTED, Disp: 1 each, Rfl: 0    acetaminophen (TYLENOL) 325 MG tablet, Take 2 tablets (650 mg total) by mouth every 8 (eight) hours as needed for Pain., Disp: 30 tablet, Rfl: 0    albuterol (VENTOLIN HFA) 90 mcg/actuation inhaler, Inhale 2 puffs into the lungs every 6 (six) hours as needed for Wheezing or Shortness of Breath. Rescue, Disp: 18 g, Rfl: 3    amLODIPine (NORVASC) 10 MG tablet, TAKE 1 TABLET BY MOUTH ONCE A DAY (DOSE INCREASE) (Patient taking differently: Take by mouth every morning.), Disp: 30 tablet, Rfl: 11    aspirin (ECOTRIN) 81 MG EC tablet, Take 81 mg by mouth every morning., Disp: , Rfl:     azelastine (ASTELIN) 137 mcg (0.1 %) nasal spray, 2 sprays (274 mcg total) by Nasal route 2 (two) times daily., Disp: 30 mL, Rfl: 6    blood sugar diagnostic (ACCU-CHEK GRACE PLUS TEST STRP) Strp, TEST THREE TIMES A DAY, Disp: 100 strip, Rfl: 11    budesonide-formoterol 160-4.5 mcg (SYMBICORT) 160-4.5 mcg/actuation HFAA, INHALE 2 PUFFS INTO THE LUNGS TWO TIMES A DAY., Disp: 10.2 g, Rfl: 11    carvediloL (COREG) 3.125 MG tablet, Take 1 tablet (3.125 mg total) by mouth 2 (two) times daily with meals., Disp: 60 tablet, Rfl: 5    diphenhydrAMINE (BENADRYL) 50 MG capsule, Take 50mg by mouth 1 hour before contrast administration., Disp: 1 capsule, Rfl: 0    empagliflozin (JARDIANCE) 25 mg tablet, Take 1 tablet (25 mg total) by mouth  once daily. (Patient taking differently: Take 25 mg by mouth every morning.), Disp: 90 tablet, Rfl: 1    famotidine (PEPCID) 40 MG tablet, TAKE 1 TABLET BY MOUTH EVERY EVENING, Disp: 30 tablet, Rfl: 11    finasteride (PROSCAR) 5 mg tablet, TAKE 1 TABLET BY MOUTH once daily, Disp: 90 tablet, Rfl: 2    fluticasone propionate (FLONASE) 50 mcg/actuation nasal spray, 2 sprays (100 mcg total) by Each Nostril route once daily., Disp: 18.2 mL, Rfl: 6    furosemide (LASIX) 40 MG tablet, Take 1 tablet (40 mg total) by mouth 2 (two) times daily., Disp: 60 tablet, Rfl: 11    GLUCOSAMINE HCL/CHONDR ROSARIO A NA (OSTEO BI-FLEX ORAL), Take 1 tablet by mouth 2 (two) times daily. , Disp: , Rfl:     insulin (LANTUS SOLOSTAR U-100 INSULIN) glargine 100 units/mL SubQ pen, Inject 44 Units into the skin every evening., Disp: 45 mL, Rfl: 1    ipratropium (ATROVENT) 21 mcg (0.03 %) nasal spray, 2 sprays by Each Nostril route 2 (two) times daily., Disp: 30 mL, Rfl: 0    krill oil 500 mg Cap, Take by mouth., Disp: , Rfl:     lancets (ACCU-CHEK FASTCLIX LANCET DRUM) [facility], TEST TWO TIMES A DAY, Disp: 102 each, Rfl: 5    levocetirizine (XYZAL) 5 MG tablet, Take 1 tablet (5 mg total) by mouth every evening., Disp: 30 tablet, Rfl: 11    levothyroxine (SYNTHROID) 300 MCG Tab, Take 1 tablet (300 mcg total) by mouth every morning., Disp: 90 tablet, Rfl: 3    lisinopriL 10 MG tablet, TAKE 1 BY MOUTH EVERY DAY (Patient taking differently: Take 10 mg by mouth every morning.), Disp: 30 tablet, Rfl: 7    meloxicam (MOBIC) 15 MG tablet, Take 15 mg by mouth., Disp: , Rfl:     metFORMIN (GLUCOPHAGE-XR) 500 MG ER 24hr tablet, Take 1 tablet (500 mg total) by mouth 2 (two) times daily with meals., Disp: 180 tablet, Rfl: 3    montelukast (SINGULAIR) 10 mg tablet, Take 1 tablet (10 mg total) by mouth once daily. (Patient taking differently: Take 10 mg by mouth every evening.), Disp: 90 tablet, Rfl: 3    pen needle, diabetic (BD ULTRA-FINE MINI PEN NEEDLE) 31 gauge x  "3/16" Ndle, USE AS DIRECTED, Disp: 100 each, Rfl: 11    propafenone (RHTHYMOL) 150 MG Tab, Take 1 tablet (150 mg total) by mouth every 8 (eight) hours., Disp: 90 tablet, Rfl: 11    RABEprazole (ACIPHEX) 20 mg tablet, Take 1 tablet (20 mg total) by mouth 2 (two) times daily., Disp: 180 tablet, Rfl: 3    REPATHA SURECLICK 140 mg/mL PnIj, SMARTSI Milligram(s) Topical Every 2 Weeks, Disp: , Rfl:     rivaroxaban (XARELTO) 20 mg Tab, Take 1 tablet (20 mg total) by mouth daily with dinner or evening meal., Disp: 30 tablet, Rfl: 11    sildenafiL (VIAGRA) 100 MG tablet, Take 1/2 to 1 tablet by mouth one hour prior to intercourse. Max 100mg per day, Disp: 30 tablet, Rfl: 11  No current facility-administered medications for this visit.    Facility-Administered Medications Ordered in Other Visits:     lactated ringers infusion, , Intravenous, Continuous, Omaira Theodore MD, New Bag at 19 1117    Review of patient's allergies indicates:   Allergen Reactions    Lipitor [atorvastatin] Other (See Comments)     Muscle aches and pains as well as fatigue.     Niacin preparations Other (See Comments)     Causes broken blood vessels and bruises at 4 times normal dose.    Statins-hmg-coa reductase inhibitors Other (See Comments)     Statins cause intolerable myalgias.    Iodinated contrast media Hives     Tachycardia    Omeprazole Hives and Itching    Prilosec [omeprazole magnesium] Hives and Itching       Family History   Problem Relation Age of Onset    Heart disease Mother     Heart disease Father     Heart disease Brother     Colon cancer Neg Hx        Social History     Tobacco Use    Smoking status: Former     Current packs/day: 0.00     Average packs/day: 0.5 packs/day for 4.0 years (2.0 ttl pk-yrs)     Types: Cigarettes     Start date: 1968     Quit date: 1972     Years since quittin.6    Smokeless tobacco: Former     Types: Chew     Quit date: 1976   Substance Use Topics    Alcohol use: " Yes     Alcohol/week: 2.0 standard drinks of alcohol     Types: 2 Cans of beer per week    Drug use: No       REVIEW OF SYSTEMS:  General: No chills, fever, malaise, changes in weight  HEENT: No visual changes, difficulty hearing  Cardiovascular: No chest pain, palpitations, claudication  Pulmonary: No dyspnea, cough, wheezing  Gastrointestinal: No nausea, vomiting, diarrhea, constipation  Genitourinary: No dysuria, low urine output, hematuria  Endocrine: No polydipsia, polyphagia  Hematologic: No fatigue with exertion, pica, pallor  Musculoskeletal: No extremity or joint pain, no back pain, no difficulty with ambulation  Neurologic: no seizures, no headaches, no weakness  Psychiatric: no mood disturbance      PHYSICAL EXAM:   BP (!) 141/68   Pulse 75   Temp 98.1 °F (36.7 °C) (Oral)   Wt 103.1 kg (227 lb 4.7 oz)   BMI 35.60 kg/m²   Constitutional:  Alert,   Well-appearing  In no distress.  BMI 34.8   Neurological: Normal speech  no focal findings  CN II - XII grossly intact.    Psychiatric: Mood and affect appropriate and symmetric.   HEENT: Normocephalic / atraumatic  PERRLA  Midline trachea  No scars across the neck   Cardiac: Regular rate and rhythm.   Pulmonary: Normal pulmonary effort.   Abdomen: Soft, not distended.     Skin: Warm and well perfused.    Vascular:  Femoral pulses 1+ bilaterally.  No ecchymosis over the left femoral site, resolving ecchymosis left aspect of scrotum.   Pedal pulses are not palpable there Doppler signals present   Extremities/  Musculoskeletal: No edema.     Feet appear pink and well performed     IMAGING:  Duplex of his SMA today shows a widely patent stent without stenosis.  Prior this was occluded      IMPRESSION:    1. Near three-week follow-up, SMA stent placement with complete resolution of his severe chronic mesenteric ischemic symptoms  2. New onset short distance thigh weariness/claudication.   Prior to this he can walk greater than 2 miles a day.    PLAN:   Recheck  CBC and BMP today, to rule out that his anemia is much worse which certainly could be causing his dizziness and what appears to be very short distance claudication  CTA aorta and runoff to rule out a new aortoiliac stenosis versus occlusion.  He wishes to have this done in [place]  Follow up afterwards with Isaac Alfred III, MD, FACS  Professor and Chief, Vascular and Endovascular Surgery      Addendum.  Hemoglobin today 8.7,  decreased from  9.9  6 days ago.     We will reach out to his primary care physician in [place] and suggest that he follow-up for his worsening anemia

## 2024-01-24 ENCOUNTER — LAB VISIT (OUTPATIENT)
Dept: LAB | Facility: HOSPITAL | Age: 79
End: 2024-01-24
Attending: PEDIATRICS
Payer: MEDICARE

## 2024-01-24 ENCOUNTER — OFFICE VISIT (OUTPATIENT)
Dept: INTERNAL MEDICINE | Facility: CLINIC | Age: 79
End: 2024-01-24
Payer: MEDICARE

## 2024-01-24 ENCOUNTER — TELEPHONE (OUTPATIENT)
Dept: INTERNAL MEDICINE | Facility: CLINIC | Age: 79
End: 2024-01-24
Payer: MEDICARE

## 2024-01-24 VITALS
OXYGEN SATURATION: 98 % | HEART RATE: 85 BPM | DIASTOLIC BLOOD PRESSURE: 62 MMHG | HEIGHT: 67 IN | TEMPERATURE: 99 F | BODY MASS INDEX: 35.09 KG/M2 | WEIGHT: 223.56 LBS | SYSTOLIC BLOOD PRESSURE: 118 MMHG

## 2024-01-24 DIAGNOSIS — K92.1 MELENA: ICD-10-CM

## 2024-01-24 DIAGNOSIS — K55.069 OCCLUSION OF SUPERIOR MESENTERIC ARTERY: ICD-10-CM

## 2024-01-24 DIAGNOSIS — I73.9 PVD (PERIPHERAL VASCULAR DISEASE): ICD-10-CM

## 2024-01-24 DIAGNOSIS — D50.0 IRON DEFICIENCY ANEMIA DUE TO CHRONIC BLOOD LOSS: ICD-10-CM

## 2024-01-24 DIAGNOSIS — I70.0 AORTOILIAC STENOSIS: ICD-10-CM

## 2024-01-24 DIAGNOSIS — K55.1 MESENTERIC ARTERY STENOSIS: Primary | ICD-10-CM

## 2024-01-24 DIAGNOSIS — I10 ESSENTIAL HYPERTENSION: Primary | Chronic | ICD-10-CM

## 2024-01-24 LAB
BASOPHILS # BLD AUTO: 0.04 K/UL (ref 0–0.2)
BASOPHILS NFR BLD: 0.7 % (ref 0–1.9)
DIFFERENTIAL METHOD BLD: ABNORMAL
EOSINOPHIL # BLD AUTO: 0.2 K/UL (ref 0–0.5)
EOSINOPHIL NFR BLD: 2.8 % (ref 0–8)
ERYTHROCYTE [DISTWIDTH] IN BLOOD BY AUTOMATED COUNT: 14.3 % (ref 11.5–14.5)
HCT VFR BLD AUTO: 26.2 % (ref 40–54)
HGB BLD-MCNC: 8.4 G/DL (ref 14–18)
IMM GRANULOCYTES # BLD AUTO: 0.01 K/UL (ref 0–0.04)
IMM GRANULOCYTES NFR BLD AUTO: 0.2 % (ref 0–0.5)
LYMPHOCYTES # BLD AUTO: 1.4 K/UL (ref 1–4.8)
LYMPHOCYTES NFR BLD: 25.4 % (ref 18–48)
MCH RBC QN AUTO: 28 PG (ref 27–31)
MCHC RBC AUTO-ENTMCNC: 32.1 G/DL (ref 32–36)
MCV RBC AUTO: 87 FL (ref 82–98)
MONOCYTES # BLD AUTO: 0.7 K/UL (ref 0.3–1)
MONOCYTES NFR BLD: 11.9 % (ref 4–15)
NEUTROPHILS # BLD AUTO: 3.3 K/UL (ref 1.8–7.7)
NEUTROPHILS NFR BLD: 59 % (ref 38–73)
NRBC BLD-RTO: 0 /100 WBC
PLATELET # BLD AUTO: 225 K/UL (ref 150–450)
PMV BLD AUTO: 9.6 FL (ref 9.2–12.9)
RBC # BLD AUTO: 3 M/UL (ref 4.6–6.2)
WBC # BLD AUTO: 5.64 K/UL (ref 3.9–12.7)

## 2024-01-24 PROCEDURE — 1160F RVW MEDS BY RX/DR IN RCRD: CPT | Mod: CPTII,S$GLB,, | Performed by: PEDIATRICS

## 2024-01-24 PROCEDURE — 3072F LOW RISK FOR RETINOPATHY: CPT | Mod: CPTII,S$GLB,, | Performed by: PEDIATRICS

## 2024-01-24 PROCEDURE — 99214 OFFICE O/P EST MOD 30 MIN: CPT | Mod: S$GLB,,, | Performed by: PEDIATRICS

## 2024-01-24 PROCEDURE — 85025 COMPLETE CBC W/AUTO DIFF WBC: CPT | Performed by: PEDIATRICS

## 2024-01-24 PROCEDURE — 3074F SYST BP LT 130 MM HG: CPT | Mod: CPTII,S$GLB,, | Performed by: PEDIATRICS

## 2024-01-24 PROCEDURE — 3078F DIAST BP <80 MM HG: CPT | Mod: CPTII,S$GLB,, | Performed by: PEDIATRICS

## 2024-01-24 PROCEDURE — 3288F FALL RISK ASSESSMENT DOCD: CPT | Mod: CPTII,S$GLB,, | Performed by: PEDIATRICS

## 2024-01-24 PROCEDURE — 36415 COLL VENOUS BLD VENIPUNCTURE: CPT | Performed by: PEDIATRICS

## 2024-01-24 PROCEDURE — 1126F AMNT PAIN NOTED NONE PRSNT: CPT | Mod: CPTII,S$GLB,, | Performed by: PEDIATRICS

## 2024-01-24 PROCEDURE — 1157F ADVNC CARE PLAN IN RCRD: CPT | Mod: CPTII,S$GLB,, | Performed by: PEDIATRICS

## 2024-01-24 PROCEDURE — 1101F PT FALLS ASSESS-DOCD LE1/YR: CPT | Mod: CPTII,S$GLB,, | Performed by: PEDIATRICS

## 2024-01-24 PROCEDURE — 99999 PR PBB SHADOW E&M-EST. PATIENT-LVL V: CPT | Mod: PBBFAC,,, | Performed by: PEDIATRICS

## 2024-01-24 PROCEDURE — 1111F DSCHRG MED/CURRENT MED MERGE: CPT | Mod: CPTII,S$GLB,, | Performed by: PEDIATRICS

## 2024-01-24 PROCEDURE — 1159F MED LIST DOCD IN RCRD: CPT | Mod: CPTII,S$GLB,, | Performed by: PEDIATRICS

## 2024-01-24 NOTE — TELEPHONE ENCOUNTER
Contacted pt to schedule CTA aorta & runoff, JAKCY, & FU with Dr. Alfred. Appointments scheduled, pt verified. Instructed pt to fast four hours prior to CTA, pt verbalized understanding. Per standing orders for iodine allergy nurse discussed instructions for prednisone prep with patient. Pt repeated instructions and verbalized understanding. Rx called in to pt's preferred pharmacy.

## 2024-01-24 NOTE — TELEPHONE ENCOUNTER
Good morning. Pt was seen in clinic by Dr. Callaway on today. He is wanting to know if you all were going to order and scheduled the CTA aorta for pt. He did not see the order in the system. Please advise. Thank you.

## 2024-01-24 NOTE — PROGRESS NOTES
Subjective     Patient ID: Erendira Pretty is a 78 y.o. male.    Chief Complaint: Hospital Follow Up    Erendira Pretty is a 78 year old male here for a hospital follow up appointment. Pt was recently hospitalized for a GI bleed and sma stenting bivascular surgery.     Hospitalization summary as follows:  HPI:   Ms. Pretty aracely  78 year old male with a history of HCC, esophageal varices, HTN, HLD, PAF (on xarelto), CAD, BPH, DELONTE on cpap, liver cirrhosis and mesenteric artery stenosis with recent stenting of SMA (on plavix and asa) who presents to the ED with complaints of dark stools x 1 week.  He denies any BRBPR, denies hematemsis, weakness/dizziness, fever/chills, sob.  He denies any NSAID usage.   In the ED, lab work notable for Hgb 10, , INR 1.1, BUN 17 and creatinine 1.0.   Case discussed with GI, will plan for EGD in am.  Last dose of plavix/asa this am 1/16, last dose of xarelto 1/15 pm.  Patient will be admitted to observation for melena.       Code Status DNR,   Surrogate Decision maker lazaor Sales        Procedure(s) (LRB):  EGD (ESOPHAGOGASTRODUODENOSCOPY) (N/A)       Hospital Course:   Mr. Pretty is a 78 year old male who was admitted to [facility] with complaints of melena.  He was evaulated by GI, recommeded EGD which he completed this afternoon.  Discussed case with GI, No bleeding noted, ok to d/c to home.  Hgb remained stable.  Patient seen and examined day of discharge and deemed suirtable to discharge to home.  He will follow up with GI and PCP.  Of note, patient is on xarleto for PAF.  He also underwent recent stenting of SMA in [place] per Dr. Norton on 1/4.  Reached out to office regarding anticoagulation, per vascular ok to continue ASA and xarelto, will stop Plavix at this time.  Discussed with patient who verbalized understanding.      Pt had a colonoscopy done on 12/23 showing polyps and ulcers. Pt was anemic while in the hospital. Pt saw Dr. Alfred (Vascular Surgery) yesterday.  His plans are to perform CTA aorta and runoff for suspected pvd. Overall pt is much improved but now having claudication. Pt is off of his plavix but still taking xoralto and baby aspirin. He reports his bowel movements are now dark brown, no melena or black stools.      Review of Systems   Constitutional:  Negative for chills and fever.   HENT:  Negative for nasal congestion and rhinorrhea.    Respiratory:  Negative for cough and shortness of breath.    Cardiovascular:  Positive for claudication. Negative for chest pain.   Gastrointestinal:  Negative for abdominal pain, blood in stool, change in bowel habit, constipation, diarrhea and nausea.   Endocrine: Negative for cold intolerance, heat intolerance, polydipsia, polyphagia and polyuria.   Genitourinary:  Negative for dysuria.   Musculoskeletal:  Positive for myalgias.   Neurological:  Negative for weakness.          Objective     Physical Exam  Constitutional:       General: He is not in acute distress.     Appearance: Normal appearance. He is normal weight. He is not ill-appearing, toxic-appearing or diaphoretic.   HENT:      Head: Atraumatic.   Cardiovascular:      Rate and Rhythm: Normal rate and regular rhythm.      Heart sounds: Normal heart sounds. No murmur heard.     Comments: Left femoral pulse 2+, right femoral pulse 1+, No palpable DP/PT pulses on right or left foot  Pulmonary:      Effort: Pulmonary effort is normal. No respiratory distress.      Breath sounds: Normal breath sounds. No stridor. No wheezing, rhonchi or rales.   Chest:      Chest wall: No tenderness.   Abdominal:      General: Abdomen is flat. Bowel sounds are normal. There is no distension.      Palpations: Abdomen is soft. There is no mass.      Tenderness: There is no abdominal tenderness. There is no guarding.   Neurological:      General: No focal deficit present.      Mental Status: He is alert and oriented to person, place, and time. Mental status is at baseline.      Cranial  Nerves: No cranial nerve deficit.      Sensory: No sensory deficit.      Motor: No weakness.      Gait: Gait normal.   Psychiatric:         Mood and Affect: Mood normal.         Behavior: Behavior normal.            Assessment and Plan     1. Essential hypertension    2. PVD (peripheral vascular disease)    3. Iron deficiency anemia due to chronic blood loss    4. Occlusion of superior mesenteric artery        Will repeat CBC to see if anemia is worsening. Eventual plans are for video capsule endoscopy. If he should have any further GI bleeding, he should go to ER. Note to Dr. Alfred's staff to check on status of CTA aorta and runoff. Follow up next month as arranged.    Transitional Care Note    Family and/or Caretaker present at visit?  No.  Diagnostic tests reviewed/disposition: I have reviewed all completed as well as pending diagnostic tests at the time of discharge.  Disease/illness education: given  Home health/community services discussion/referrals: Patient does not have home health established from hospital visit.  They do not need home health.  If needed, we will set up home health for the patient.   Establishment or re-establishment of referral orders for community resources: No other necessary community resources.   Discussion with other health care providers:  f/u arranged .               No follow-ups on file.

## 2024-01-25 ENCOUNTER — HOSPITAL ENCOUNTER (OUTPATIENT)
Facility: HOSPITAL | Age: 79
Discharge: HOME | End: 2024-01-26
Attending: EMERGENCY MEDICINE | Admitting: EMERGENCY MEDICINE
Payer: MEDICARE

## 2024-01-25 DIAGNOSIS — K92.2 GI BLEED: ICD-10-CM

## 2024-01-25 DIAGNOSIS — R80.9 TYPE 2 DIABETES MELLITUS WITH MICROALBUMINURIA, WITH LONG-TERM CURRENT USE OF INSULIN: Primary | Chronic | ICD-10-CM

## 2024-01-25 DIAGNOSIS — D62 ABLA (ACUTE BLOOD LOSS ANEMIA): ICD-10-CM

## 2024-01-25 DIAGNOSIS — K92.1 GASTROINTESTINAL HEMORRHAGE WITH MELENA: ICD-10-CM

## 2024-01-25 DIAGNOSIS — E11.29 TYPE 2 DIABETES MELLITUS WITH MICROALBUMINURIA, WITH LONG-TERM CURRENT USE OF INSULIN: Primary | Chronic | ICD-10-CM

## 2024-01-25 DIAGNOSIS — Z79.4 TYPE 2 DIABETES MELLITUS WITH MICROALBUMINURIA, WITH LONG-TERM CURRENT USE OF INSULIN: Primary | Chronic | ICD-10-CM

## 2024-01-25 DIAGNOSIS — Z95.1 S/P CABG X 2: ICD-10-CM

## 2024-01-25 LAB
ABO + RH BLD: NORMAL
ALBUMIN SERPL BCP-MCNC: 3.7 G/DL (ref 3.5–5.2)
ALP SERPL-CCNC: 80 U/L (ref 55–135)
ALT SERPL W/O P-5'-P-CCNC: 21 U/L (ref 10–44)
ANION GAP SERPL CALC-SCNC: 8 MMOL/L (ref 8–16)
AST SERPL-CCNC: 22 U/L (ref 10–40)
BASOPHILS # BLD AUTO: 0.03 K/UL (ref 0–0.2)
BASOPHILS NFR BLD: 0.6 % (ref 0–1.9)
BILIRUB SERPL-MCNC: 0.3 MG/DL (ref 0.1–1)
BLD GP AB SCN CELLS X3 SERPL QL: NORMAL
BUN SERPL-MCNC: 20 MG/DL (ref 8–23)
CALCIUM SERPL-MCNC: 9.3 MG/DL (ref 8.7–10.5)
CHLORIDE SERPL-SCNC: 106 MMOL/L (ref 95–110)
CO2 SERPL-SCNC: 24 MMOL/L (ref 23–29)
CREAT SERPL-MCNC: 1.1 MG/DL (ref 0.5–1.4)
DIFFERENTIAL METHOD BLD: ABNORMAL
EOSINOPHIL # BLD AUTO: 0.1 K/UL (ref 0–0.5)
EOSINOPHIL NFR BLD: 2.9 % (ref 0–8)
ERYTHROCYTE [DISTWIDTH] IN BLOOD BY AUTOMATED COUNT: 14.1 % (ref 11.5–14.5)
EST. GFR  (NO RACE VARIABLE): >60 ML/MIN/1.73 M^2
GLUCOSE SERPL-MCNC: 136 MG/DL (ref 70–110)
HCT VFR BLD AUTO: 26.1 % (ref 40–54)
HGB BLD-MCNC: 8.4 G/DL (ref 14–18)
IMM GRANULOCYTES # BLD AUTO: 0.01 K/UL (ref 0–0.04)
IMM GRANULOCYTES NFR BLD AUTO: 0.2 % (ref 0–0.5)
INR PPP: 1.1 (ref 0.8–1.2)
LYMPHOCYTES # BLD AUTO: 1.3 K/UL (ref 1–4.8)
LYMPHOCYTES NFR BLD: 26.1 % (ref 18–48)
MCH RBC QN AUTO: 28.3 PG (ref 27–31)
MCHC RBC AUTO-ENTMCNC: 32.2 G/DL (ref 32–36)
MCV RBC AUTO: 88 FL (ref 82–98)
MONOCYTES # BLD AUTO: 0.4 K/UL (ref 0.3–1)
MONOCYTES NFR BLD: 9.2 % (ref 4–15)
NEUTROPHILS # BLD AUTO: 2.9 K/UL (ref 1.8–7.7)
NEUTROPHILS NFR BLD: 61 % (ref 38–73)
NRBC BLD-RTO: 0 /100 WBC
OB PNL STL: POSITIVE
PLATELET # BLD AUTO: 229 K/UL (ref 150–450)
PMV BLD AUTO: 9.9 FL (ref 9.2–12.9)
POTASSIUM SERPL-SCNC: 4 MMOL/L (ref 3.5–5.1)
PROT SERPL-MCNC: 7.8 G/DL (ref 6–8.4)
PROTHROMBIN TIME: 11.3 SEC (ref 9–12.5)
RBC # BLD AUTO: 2.97 M/UL (ref 4.6–6.2)
SODIUM SERPL-SCNC: 138 MMOL/L (ref 136–145)
SPECIMEN OUTDATE: NORMAL
WBC # BLD AUTO: 4.79 K/UL (ref 3.9–12.7)

## 2024-01-25 PROCEDURE — 85025 COMPLETE CBC W/AUTO DIFF WBC: CPT | Performed by: REGISTERED NURSE

## 2024-01-25 PROCEDURE — C9113 INJ PANTOPRAZOLE SODIUM, VIA: HCPCS | Performed by: EMERGENCY MEDICINE

## 2024-01-25 PROCEDURE — 96374 THER/PROPH/DIAG INJ IV PUSH: CPT

## 2024-01-25 PROCEDURE — 85610 PROTHROMBIN TIME: CPT | Performed by: REGISTERED NURSE

## 2024-01-25 PROCEDURE — 82272 OCCULT BLD FECES 1-3 TESTS: CPT | Performed by: EMERGENCY MEDICINE

## 2024-01-25 PROCEDURE — 99285 EMERGENCY DEPT VISIT HI MDM: CPT

## 2024-01-25 PROCEDURE — 80053 COMPREHEN METABOLIC PANEL: CPT | Performed by: REGISTERED NURSE

## 2024-01-25 PROCEDURE — 25000003 PHARM REV CODE 250: Performed by: EMERGENCY MEDICINE

## 2024-01-25 PROCEDURE — 63600175 PHARM REV CODE 636 W HCPCS: Performed by: EMERGENCY MEDICINE

## 2024-01-25 PROCEDURE — 86901 BLOOD TYPING SEROLOGIC RH(D): CPT | Performed by: REGISTERED NURSE

## 2024-01-25 PROCEDURE — A4216 STERILE WATER/SALINE, 10 ML: HCPCS | Performed by: EMERGENCY MEDICINE

## 2024-01-25 PROCEDURE — G0378 HOSPITAL OBSERVATION PER HR: HCPCS

## 2024-01-25 RX ORDER — POLYETHYLENE GLYCOL 3350 17 G/17G
17 POWDER, FOR SOLUTION ORAL DAILY PRN
Status: DISCONTINUED | OUTPATIENT
Start: 2024-01-25 | End: 2024-01-26 | Stop reason: HOSPADM

## 2024-01-25 RX ORDER — SODIUM CHLORIDE 0.9 % (FLUSH) 0.9 %
10 SYRINGE (ML) INJECTION EVERY 8 HOURS
Status: DISCONTINUED | OUTPATIENT
Start: 2024-01-25 | End: 2024-01-26 | Stop reason: HOSPADM

## 2024-01-25 RX ORDER — ONDANSETRON 8 MG/1
8 TABLET, ORALLY DISINTEGRATING ORAL EVERY 8 HOURS PRN
Status: DISCONTINUED | OUTPATIENT
Start: 2024-01-25 | End: 2024-01-26 | Stop reason: HOSPADM

## 2024-01-25 RX ORDER — PREDNISONE 50 MG/1
50 TABLET ORAL 3 TIMES DAILY PRN
Status: ON HOLD | COMMUNITY
Start: 2024-01-24 | End: 2024-01-26 | Stop reason: HOSPADM

## 2024-01-25 RX ORDER — AMOXICILLIN 250 MG
1 CAPSULE ORAL 2 TIMES DAILY PRN
Status: DISCONTINUED | OUTPATIENT
Start: 2024-01-25 | End: 2024-01-26 | Stop reason: HOSPADM

## 2024-01-25 RX ORDER — NALOXONE HCL 0.4 MG/ML
0.02 VIAL (ML) INJECTION
Status: DISCONTINUED | OUTPATIENT
Start: 2024-01-25 | End: 2024-01-26 | Stop reason: HOSPADM

## 2024-01-25 RX ORDER — TALC
6 POWDER (GRAM) TOPICAL NIGHTLY PRN
Status: DISCONTINUED | OUTPATIENT
Start: 2024-01-25 | End: 2024-01-26 | Stop reason: HOSPADM

## 2024-01-25 RX ORDER — PANTOPRAZOLE SODIUM 40 MG/10ML
40 INJECTION, POWDER, LYOPHILIZED, FOR SOLUTION INTRAVENOUS 2 TIMES DAILY
Status: DISCONTINUED | OUTPATIENT
Start: 2024-01-25 | End: 2024-01-26 | Stop reason: HOSPADM

## 2024-01-25 RX ORDER — ACETAMINOPHEN 325 MG/1
650 TABLET ORAL EVERY 4 HOURS PRN
Status: DISCONTINUED | OUTPATIENT
Start: 2024-01-25 | End: 2024-01-26 | Stop reason: HOSPADM

## 2024-01-25 RX ADMIN — PANTOPRAZOLE SODIUM 40 MG: 40 INJECTION, POWDER, FOR SOLUTION INTRAVENOUS at 08:01

## 2024-01-25 RX ADMIN — SODIUM CHLORIDE 10 ML: 9 INJECTION INTRAMUSCULAR; INTRAVENOUS; SUBCUTANEOUS at 10:01

## 2024-01-25 NOTE — FIRST PROVIDER EVALUATION
"Medical screening examination initiated.  I have conducted a focused provider triage encounter, findings are as follows:    Brief history of present illness:  Dark stools and anemia    Vitals:    01/25/24 1015   BP: 125/65   BP Location: Right arm   Patient Position: Sitting   Pulse: 88   Resp: 20   Temp: 98.9 °F (37.2 °C)   TempSrc: Oral   SpO2: 99%   Weight: 101.5 kg (223 lb 14 oz)   Height: 5' 7" (1.702 m)       Pertinent physical exam:  nad    Brief workup plan:  workup    Preliminary workup initiated; this workup will be continued and followed by the physician or advanced practice provider that is assigned to the patient when roomed.  "

## 2024-01-25 NOTE — HPI
78 y.o. male patient with a PMHx of CAD, COPD, HTN. Presented to the Emergency Department for blood in stool, onset 2 weeks PTA and worsening anemia. 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 Pt reports having dark stool. Symptoms are episodic and moderate in severity. No mitigating or exacerbating factors reported. Associated sxs include intermittent lightheadedness. Patient denies any fever, chills, n/v/d, SOB, CP, weakness, numbness, dizziness, headache, and all other sxs at this time. Pt is currently on Xarelto. Evaluated by Vascular Surgery on 1/23, planned for imaging studies, noted to have worsening anemia at visit, referred to PCP, repeat labs show Hgb: 9.9 on 1/7, decreased to 8.7 on 1/23, and further decline to 8.4 on 1/24. Reports continued dark stools, with worsening dizziness. In the ED, pt was mildly orthostatic, Bun/Cr normal. Pt started on IV Protonix after consultation by the ER with GI service- Dr. King and placed in Obs under HM.

## 2024-01-25 NOTE — ED PROVIDER NOTES
SCRIBE #1 NOTE: I, Pradip Avendaño, am scribing for, and in the presence of, Sandoval Ness MD. I have scribed the HPI, ROS, and PE      SCRIBE #2 NOTE: I, Tory Key, am scribing for, and in the presence of,  Marii Bryson DO. I have scribed the remaining portions of the note not scribed by Scribe #1.     History      Chief Complaint   Patient presents with    Melena     Dark stools x 1 week sent by PCP on xarelto for afib       Review of patient's allergies indicates:   Allergen Reactions    Lipitor [atorvastatin] Other (See Comments)     Muscle aches and pains as well as fatigue.     Niacin preparations Other (See Comments)     Causes broken blood vessels and bruises at 4 times normal dose.    Statins-hmg-coa reductase inhibitors Other (See Comments)     Statins cause intolerable myalgias.    Iodinated contrast media Hives     Tachycardia    Omeprazole Hives and Itching    Prilosec [omeprazole magnesium] Hives and Itching        HPI   HPI    1/25/2024, 11:28 AM   History obtained from the patient      History of Present Illness: Erendira Pretty is a 78 y.o. male patient with a PMHx of CAD, COPD, HTN who presents to the Emergency Department for blood in stool, onset 2 weeks PTA. 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 Pt reports having dark stool. Symptoms are episodic and moderate in severity. No mitigating or exacerbating factors reported. Associated sxs include intermittent lightheadedness. Patient denies any fever, chills, n/v/d, SOB, CP, weakness, numbness, dizziness, headache, and all other sxs at this time. Pt is currently on Xarelto. No further complaints or concerns at this time.     Arrival mode: Personal vehicle    PCP: Bhupinder Callaway MD       Past Medical History:  Past Medical History:   Diagnosis Date    Anticoagulant long-term use     Aortic stenosis     Asthma     Benign prostatic hyperplasia with nocturia     Bilateral carotid artery  stenosis 07/21/2021    US CAROTID BILATERAL 07/14/2021 IMPRESSION: Atherosclerosis with suspected stenosis greater than 50% at the right proximal ICA visually. Velocities are discordant and appear improved from prior. Atherosclerosis on the left with no evidence of flow-limiting stenosis greater than 50%.  FINDINGS: Right: Internal Carotid Artery (ICA): Peak systolic velocity 118 cm/sec. End diastolic velocity 35 cm/sec    BPH (benign prostatic hyperplasia)     CAD (coronary artery disease)     40% lesion 2002;violet    Cirrhosis     Claudication 04/09/2014    Colon polyp     COPD (chronic obstructive pulmonary disease)     ED (erectile dysfunction)     Encounter for blood transfusion     Ex-smoker     Hearing loss NEC     Hepatitis C     Cured;dr gibbs harvoni 2015    Hyperlipidemia     Hypertension     Hypothyroid     s/p tx graves    Open angle with borderline findings and low glaucoma risk in both eyes 09/22/2020    DELONTE on CPAP     Osteoarthritis     Paroxysmal atrial fibrillation     PVD (peripheral vascular disease)     Secondary esophageal varices without bleeding 06/26/2017    Type 2 diabetes mellitus        Past Surgical History:  Past Surgical History:   Procedure Laterality Date    ANGIOGRAM, EXTREMITY, UNILATERAL Left 1/4/2024    Procedure: ANGIOGRAM, EXTREMITY, UNILATERAL- Femoral / SMS Stent, Poss General;  Surgeon: ALEX Alfred III, MD;  Location: [facility] OR [address] [place] [address];  Service: Vascular;  Laterality: Left;  mGy : 2698.78  Gy.cm : 229.88  Contrast : 62ml  Fluro time : 13.8min    CARDIAC CATHETERIZATION      CARDIAC SURGERY      CARPAL TUNNEL RELEASE Left     CATARACT EXTRACTION      CATARACT EXTRACTION W/ INTRAOCULAR LENS  IMPLANT, BILATERAL  2008    CATHETERIZATION OF BOTH LEFT AND RIGHT HEART N/A 11/2/2018    Procedure: CATHETERIZATION, HEART, BOTH LEFT AND RIGHT;  Surgeon: Frank Gallardo MD;  Location: [facility] CATH LAB;  Service: Cardiology;  Laterality: N/A;    COLONOSCOPY  8/2013     COLONOSCOPY N/A 5/30/2016    Procedure: COLONOSCOPY;  Surgeon: Anna Tomas MD;  Location: [facility] ENDO;  Service: Endoscopy;  Laterality: N/A;    COLONOSCOPY N/A 7/17/2019    Procedure: COLONOSCOPY;  Surgeon: David Carter III, MD;  Location: [facility] ENDO;  Service: Endoscopy;  Laterality: N/A;    COLONOSCOPY N/A 12/14/2023    Procedure: COLONOSCOPY;  Surgeon: Ama Barrera MD;  Location: [facility] ENDO;  Service: Endoscopy;  Laterality: N/A;    COMPUTED TOMOGRAPHY N/A 9/9/2019    Procedure: CT (COMPUTED TOMOGRAPHY);  Surgeon: [facility] Diagnostic Provider;  Location: [facility] OR;  Service: General;  Laterality: N/A;  Microwave ablation to be done in CT.  Need CRNA and cart    COMPUTED TOMOGRAPHY N/A 1/25/2022    Procedure: Liver Microwave Ablation;  Surgeon: Red Puentes MD;  Location: [facility];  Service: General;  Laterality: N/A;    CORONARY ARTERY BYPASS GRAFT (CABG) N/A 11/5/2018    Procedure: CORONARY ARTERY BYPASS GRAFT (CABG);  Surgeon: Bear De Luna MD;  Location: [facility] OR;  Service: Cardiothoracic;  Laterality: N/A;  TWO VESSEL BYPASS WITH AORTOTOMY AND EXCISION OF ATHEROSCLEROTIC PLAQUE    CORONARY BYPASS GRAFT ANGIOGRAPHY  3/2/2020    Procedure: Bypass graft study;  Surgeon: Cora Cormier MD;  Location: [facility] CATH LAB;  Service: Cardiology;;    ELBOW BURSA SURGERY Right 2010    ENDOSCOPIC HARVEST OF VEIN Left 11/5/2018    Procedure: SURGICAL PROCUREMENT, VEIN, ENDOSCOPIC;  Surgeon: Bear De Luna MD;  Location: [facility] OR;  Service: Cardiothoracic;  Laterality: Left;    ESOPHAGOGASTRODUODENOSCOPY N/A 7/17/2019    Procedure: ESOPHAGOGASTRODUODENOSCOPY (EGD);  Surgeon: David Carter III, MD;  Location: [facility];  Service: Endoscopy;  Laterality: N/A;    ESOPHAGOGASTRODUODENOSCOPY N/A 12/29/2022    Procedure: ESOPHAGOGASTRODUODENOSCOPY (EGD);  Surgeon: Issa Gayle MD;  Location: [facility] ENDO;  Service: Endoscopy;  Laterality: N/A;    ESOPHAGOGASTRODUODENOSCOPY N/A 1/17/2024    Procedure: EGD  (ESOPHAGOGASTRODUODENOSCOPY);  Surgeon: Issa Gayle MD;  Location: [facility] ENDO;  Service: Endoscopy;  Laterality: N/A;    ETHMOIDECTOMY Bilateral 3/27/2019    Procedure: ETHMOIDECTOMY;  Surgeon: Alejandro Parkinson MD;  Location: [facility] OR;  Service: ENT;  Laterality: Bilateral;    EYE SURGERY      FOOT SURGERY      FRONTAL SINUS OBLITERATION Bilateral 3/27/2019    Procedure: SINUSOTOMY, FRONTAL SINUS, OBLITERATIVE;  Surgeon: Alejandro Parkinson MD;  Location: [facility] OR;  Service: ENT;  Laterality: Bilateral;    FUNCTIONAL ENDOSCOPIC SINUS SURGERY (FESS) Bilateral 3/27/2019    Procedure: FESS (FUNCTIONAL ENDOSCOPIC SINUS SURGERY);  Surgeon: Alejandro Parkinson MD;  Location: [facility] OR;  Service: ENT;  Laterality: Bilateral;  Left Keila bullosa resection     KNEE ARTHROSCOPY W/ MENISCAL REPAIR Left 06/2019    dr boykin    KNEE SURGERY Right     LEFT HEART CATHETERIZATION Left 3/2/2020    Procedure: CATHETERIZATION, HEART, LEFT;  Surgeon: Cora Cormier MD;  Location: [facility] CATH LAB;  Service: Cardiology;  Laterality: Left;    LIVER BIOPSY  01/2022    MAXILLARY ANTROSTOMY Bilateral 3/27/2019    Procedure: MAXILLARY ANTROSTOMY;  Surgeon: Alejandro Parkinson MD;  Location: [facility] OR;  Service: ENT;  Laterality: Bilateral;    NASAL SEPTOPLASTY N/A 3/27/2019    Procedure: SEPTOPLASTY, NOSE;  Surgeon: Alejandro Parkinson MD;  Location: [facility] OR;  Service: ENT;  Laterality: N/A;    RECTAL SURGERY  2012    STENT, SUPERIOR MESENTERIC ARTERY Left 1/4/2024    Procedure: STENT, SUPERIOR MESENTERIC ARTERY;  Surgeon: ALEX Alfred III, MD;  Location: [address];  Service: Vascular;  Laterality: Left;    TRANSURETHRAL RESECTION OF PROSTATE (TURP) WITHOUT USE OF LASER N/A 6/19/2018    Procedure: TURP, WITHOUT USING LASER;  Surgeon: Cooper Cordova IV, MD;  Location: [facility];  Service: Urology;  Laterality: N/A;    TRIGGER FINGER RELEASE Left          Family History:  Family History   Problem Relation Age of Onset    Heart disease Mother     Heart disease Father      Heart disease Brother     Colon cancer Neg Hx        Social History:  Social History     Tobacco Use    Smoking status: Former     Current packs/day: 0.00     Average packs/day: 0.5 packs/day for 4.0 years (2.0 ttl pk-yrs)     Types: Cigarettes     Start date: 1968     Quit date: 1972     Years since quittin.6    Smokeless tobacco: Former     Types: Chew     Quit date: 1976   Substance and Sexual Activity    Alcohol use: Yes     Alcohol/week: 2.0 standard drinks of alcohol     Types: 2 Cans of beer per week    Drug use: No    Sexual activity: Yes     Partners: Female       ROS   Review of Systems   Constitutional:  Negative for chills and fever.   HENT:  Negative for sore throat.    Respiratory:  Negative for shortness of breath.    Cardiovascular:  Negative for chest pain.   Gastrointestinal:  Positive for blood in stool. Negative for diarrhea, nausea and vomiting.   Genitourinary:  Negative for dysuria.   Musculoskeletal:  Negative for back pain.   Skin:  Negative for rash.   Neurological:  Positive for light-headedness (intermittent). Negative for dizziness, syncope, weakness, numbness and headaches.   Hematological:  Does not bruise/bleed easily.   All other systems reviewed and are negative.    Physical Exam      Initial Vitals [24 1015]   BP Pulse Resp Temp SpO2   125/65 88 20 98.9 °F (37.2 °C) 99 %      MAP       --          Physical Exam  Nursing Notes and Vital Signs Reviewed.  Constitutional: Patient is in no acute distress. Well-developed and well-nourished.  Head: Atraumatic. Normocephalic.  Eyes: PERRL. EOM intact. Conjunctivae are not pale. No scleral icterus.  ENT: Mucous membranes are moist. Oropharynx is clear and symmetric.    Neck: Supple. Full ROM.   Cardiovascular: Regular rate. Regular rhythm. No murmurs, rubs, or gallops. Distal pulses are 2+ and symmetric.  Pulmonary/Chest: No respiratory distress. Clear to auscultation bilaterally. No wheezing or rales.  Abdominal:  "Soft and non-distended.  There is no tenderness.  No rebound, guarding, or rigidity.   Rectal: Male chaperone present for the duration of the rectal exam.There is no tenderness. No masses or hemorrhoids. Normal sphincter tone. The stool color is dark stool in the rectal vault.  Musculoskeletal: Moves all extremities. No obvious deformities. No edema.  Skin: Warm and dry.  Neurological:  Alert, awake, and appropriate.  Normal speech.  No acute focal neurological deficits are appreciated.  Psychiatric: Normal affect. Good eye contact. Appropriate in content.    ED Course    Procedures  ED Vital Signs:  Vitals:    01/25/24 1015 01/25/24 1317 01/25/24 1400 01/25/24 1448   BP: 125/65 (!) 106/58 126/62 (!) 152/75   Pulse: 88  79 80   Resp: 20   18   Temp: 98.9 °F (37.2 °C)      TempSrc: Oral      SpO2: 99%  99% 100%   Weight: 101.5 kg (223 lb 14 oz)      Height: 5' 7" (1.702 m)       01/25/24 1500 01/25/24 1600 01/25/24 1700   BP: (!) 163/77 132/61 133/71   Pulse: 78 76 82   Resp:      Temp:      TempSrc:      SpO2: 100% 100% 100%   Weight:      Height:          Abnormal Lab Results:  Labs Reviewed   CBC W/ AUTO DIFFERENTIAL - Abnormal; Notable for the following components:       Result Value    RBC 2.97 (*)     Hemoglobin 8.4 (*)     Hematocrit 26.1 (*)     All other components within normal limits   COMPREHENSIVE METABOLIC PANEL - Abnormal; Notable for the following components:    Glucose 136 (*)     All other components within normal limits   OCCULT BLOOD X 1, STOOL - Abnormal; Notable for the following components:    Occult Blood Positive (*)     All other components within normal limits   PROTIME-INR   TYPE & SCREEN        All Lab Results:  Results for orders placed or performed during the hospital encounter of 01/25/24   CBC auto differential   Result Value Ref Range    WBC 4.79 3.90 - 12.70 K/uL    RBC 2.97 (L) 4.60 - 6.20 M/uL    Hemoglobin 8.4 (L) 14.0 - 18.0 g/dL    Hematocrit 26.1 (L) 40.0 - 54.0 %    MCV 88 82 " - 98 fL    MCH 28.3 27.0 - 31.0 pg    MCHC 32.2 32.0 - 36.0 g/dL    RDW 14.1 11.5 - 14.5 %    Platelets 229 150 - 450 K/uL    MPV 9.9 9.2 - 12.9 fL    Immature Granulocytes 0.2 0.0 - 0.5 %    Gran # (ANC) 2.9 1.8 - 7.7 K/uL    Immature Grans (Abs) 0.01 0.00 - 0.04 K/uL    Lymph # 1.3 1.0 - 4.8 K/uL    Mono # 0.4 0.3 - 1.0 K/uL    Eos # 0.1 0.0 - 0.5 K/uL    Baso # 0.03 0.00 - 0.20 K/uL    nRBC 0 0 /100 WBC    Gran % 61.0 38.0 - 73.0 %    Lymph % 26.1 18.0 - 48.0 %    Mono % 9.2 4.0 - 15.0 %    Eosinophil % 2.9 0.0 - 8.0 %    Basophil % 0.6 0.0 - 1.9 %    Differential Method Automated    Comprehensive metabolic panel   Result Value Ref Range    Sodium 138 136 - 145 mmol/L    Potassium 4.0 3.5 - 5.1 mmol/L    Chloride 106 95 - 110 mmol/L    CO2 24 23 - 29 mmol/L    Glucose 136 (H) 70 - 110 mg/dL    BUN 20 8 - 23 mg/dL    Creatinine 1.1 0.5 - 1.4 mg/dL    Calcium 9.3 8.7 - 10.5 mg/dL    Total Protein 7.8 6.0 - 8.4 g/dL    Albumin 3.7 3.5 - 5.2 g/dL    Total Bilirubin 0.3 0.1 - 1.0 mg/dL    Alkaline Phosphatase 80 55 - 135 U/L    AST 22 10 - 40 U/L    ALT 21 10 - 44 U/L    eGFR >60 >60 mL/min/1.73 m^2    Anion Gap 8 8 - 16 mmol/L   Protime-INR   Result Value Ref Range    Prothrombin Time 11.3 9.0 - 12.5 sec    INR 1.1 0.8 - 1.2   Occult blood x 1, stool    Specimen: Stool   Result Value Ref Range    Occult Blood Positive (A) Negative   Type & Screen   Result Value Ref Range    Group & Rh O POS     Indirect Rock NEG     Specimen Outdate 01/28/2024 23:59      *Note: Due to a large number of results and/or encounters for the requested time period, some results have not been displayed. A complete set of results can be found in Results Review.     Imaging Results:  Imaging Results    None                 The Emergency Provider reviewed the vital signs and test results, which are outlined above.    ED Discussion     4:00 PM: Dr. Ness transfers care of patient to Dr. Bryson pending lab results.     5:11 PM: Discussed pt's  case with Dr. King (Gastroenterology) who recommends keeping pt on IV PPI.     5:11 PM: Discussed case with Fernando (Gastroenterology), recommends admission for observation. Dr. Peña agrees with current care and management of pt and accepts admission.   Admitting Service: Hospital medicine  Admitting Physician: Dr. Peña  Admit to: Obs med/tele     5:11 PM: Re-evaluated pt. I have discussed test results, shared treatment plan, and the need for admission with patient and family at bedside. Pt and family express understanding at this time and agree with all information. All questions answered. Pt and family have no further questions or concerns at this time. Pt is ready for admit.       ED Course as of 01/25/24 1754   Thu Jan 25, 2024   1632 78-year-old male with a past medical history of HCC, esophageal varices, HTN, HLD, PAF (on xarelto), CAD, BPH, DELONTE on cpap, liver cirrhosis and mesenteric artery stenosis with recent stenting of SMA (on plavix and asa) presents with melena.  He had a negative EGD on 01/17/2024.  At that hemoglobin was 10.0.  It is now 8.4.  His Hemoccult is positive.  His vital signs are stable. [NF]   1648 Occult Blood(!): Positive [NF]   1648 Hemoglobin(!): 8.4 [NF]   1648 Hematocrit(!): 26.1 [NF]      ED Course User Index  [NF] Marii Bryson, DO       ED Medication(s):  Medications   sodium chloride 0.9% flush 10 mL (has no administration in time range)   melatonin tablet 6 mg (has no administration in time range)   polyethylene glycol packet 17 g (has no administration in time range)   senna-docusate 8.6-50 mg per tablet 1 tablet (has no administration in time range)   acetaminophen tablet 650 mg (has no administration in time range)   naloxone 0.4 mg/mL injection 0.02 mg (has no administration in time range)   ondansetron disintegrating tablet 8 mg (has no administration in time range)   pantoprazole injection 40 mg (has no administration in time range)         New Prescriptions    No  medications on file         Medical Decision Making    Medical Decision Making  Amount and/or Complexity of Data Reviewed  External Data Reviewed: labs.     Details: CBC drawn yesterday showed H/H of 8.4/26.3.  Labs: ordered. Decision-making details documented in ED Course.                Scribe Attestation:   Scribe #1: I performed the above scribed service and the documentation accurately describes the services I performed. I attest to the accuracy of the note.    Attending:   Physician Attestation Statement for Scribe #1: IGrover Jon M., MD, personally performed the services described in this documentation, as scribed by Pradip Avendaño, in my presence, and it is both accurate and complete.         Attending Attestation:           Physician Attestation for Scribe:    Physician Attestation Statement for Scribe #2: Marii RUIZ DO, reviewed documentation, as scribed by Tory Key in my presence, and it is both accurate and complete. I also acknowledge and confirm the content of the note done by Scribe #1.          Clinical Impression       ICD-10-CM ICD-9-CM   1. GI bleed  K92.2 578.9       Disposition:   Disposition: Placed in Observation  Condition: Stable         Marii Bryson DO  01/25/24 4681

## 2024-01-25 NOTE — TELEPHONE ENCOUNTER
No care due was identified.  [facility] Embedded Care Due Messages. Reference number: [id number].   1/25/2024 9:03:29 AM CST

## 2024-01-25 NOTE — ADMISSIONCARE
AdmissionCare    Guideline: Gastrointestinal Bleeding (Upper) - INPT, Inpatient    Based on the indications selected for the patient, the bed status of Admit to Inpatient was determined to be MET    The following indications were selected as present at the time of evaluation of the patient:      - Ongoing active bleeding (eg, decreasing hematocrit)    Presence of significant clinical finding, as indicated by 1 or more of the following:    -      - Dyspnea    AdmissionCare documentation entered by: Radha King    [facility] Gleam, 27th edition, Copyright © 2023 [facility] Gleam, [facility] All Rights Reserved.  [id number]

## 2024-01-25 NOTE — TELEPHONE ENCOUNTER
"----- Message from Everett Ayon sent at 1/25/2024  8:22 AM CST -----  Contact: [phone]  Patient needs a refill on pen needle, diabetic (BD ULTRA-FINE MINI PEN NEEDLE) 31 gauge x 3/16" Ndle and blood sugar diagnostic (ACCU-CHEK GRACE PLUS TEST STRP) Strp  called into [facility]  pharmacy at  [phone].  Please call patient at [phone]  if you have any questions.         [facility] - [place], [place] - [address] [facility] [address]  [address] [address] [address]  Phone: [phone] Fax: [phone]           Thanks KB     "

## 2024-01-26 VITALS
HEART RATE: 81 BPM | DIASTOLIC BLOOD PRESSURE: 90 MMHG | RESPIRATION RATE: 16 BRPM | OXYGEN SATURATION: 100 % | WEIGHT: 223.88 LBS | SYSTOLIC BLOOD PRESSURE: 130 MMHG | TEMPERATURE: 98 F | BODY MASS INDEX: 35.14 KG/M2 | HEIGHT: 67 IN

## 2024-01-26 PROBLEM — D62 ABLA (ACUTE BLOOD LOSS ANEMIA): Status: RESOLVED | Noted: 2024-01-25 | Resolved: 2024-01-26

## 2024-01-26 PROBLEM — K92.1 GASTROINTESTINAL HEMORRHAGE WITH MELENA: Status: RESOLVED | Noted: 2024-01-16 | Resolved: 2024-01-26

## 2024-01-26 LAB
BASOPHILS # BLD AUTO: 0.03 K/UL (ref 0–0.2)
BASOPHILS NFR BLD: 0.8 % (ref 0–1.9)
DIFFERENTIAL METHOD BLD: ABNORMAL
EOSINOPHIL # BLD AUTO: 0.1 K/UL (ref 0–0.5)
EOSINOPHIL NFR BLD: 3.8 % (ref 0–8)
ERYTHROCYTE [DISTWIDTH] IN BLOOD BY AUTOMATED COUNT: 13.8 % (ref 11.5–14.5)
HCT VFR BLD AUTO: 26.5 % (ref 40–54)
HGB BLD-MCNC: 8.4 G/DL (ref 14–18)
IMM GRANULOCYTES # BLD AUTO: 0.01 K/UL (ref 0–0.04)
IMM GRANULOCYTES NFR BLD AUTO: 0.3 % (ref 0–0.5)
LYMPHOCYTES # BLD AUTO: 1.1 K/UL (ref 1–4.8)
LYMPHOCYTES NFR BLD: 29.1 % (ref 18–48)
MCH RBC QN AUTO: 27.5 PG (ref 27–31)
MCHC RBC AUTO-ENTMCNC: 31.7 G/DL (ref 32–36)
MCV RBC AUTO: 87 FL (ref 82–98)
MONOCYTES # BLD AUTO: 0.5 K/UL (ref 0.3–1)
MONOCYTES NFR BLD: 13.2 % (ref 4–15)
NEUTROPHILS # BLD AUTO: 1.9 K/UL (ref 1.8–7.7)
NEUTROPHILS NFR BLD: 52.8 % (ref 38–73)
NRBC BLD-RTO: 0 /100 WBC
PLATELET # BLD AUTO: 204 K/UL (ref 150–450)
PMV BLD AUTO: 9.5 FL (ref 9.2–12.9)
RBC # BLD AUTO: 3.06 M/UL (ref 4.6–6.2)
WBC # BLD AUTO: 3.64 K/UL (ref 3.9–12.7)

## 2024-01-26 PROCEDURE — G0378 HOSPITAL OBSERVATION PER HR: HCPCS

## 2024-01-26 PROCEDURE — 25000003 PHARM REV CODE 250: Performed by: EMERGENCY MEDICINE

## 2024-01-26 PROCEDURE — 63600175 PHARM REV CODE 636 W HCPCS: Performed by: EMERGENCY MEDICINE

## 2024-01-26 PROCEDURE — 85025 COMPLETE CBC W/AUTO DIFF WBC: CPT | Performed by: EMERGENCY MEDICINE

## 2024-01-26 PROCEDURE — 36415 COLL VENOUS BLD VENIPUNCTURE: CPT | Performed by: EMERGENCY MEDICINE

## 2024-01-26 PROCEDURE — C9113 INJ PANTOPRAZOLE SODIUM, VIA: HCPCS | Performed by: EMERGENCY MEDICINE

## 2024-01-26 PROCEDURE — 96376 TX/PRO/DX INJ SAME DRUG ADON: CPT

## 2024-01-26 PROCEDURE — 99214 OFFICE O/P EST MOD 30 MIN: CPT | Mod: ,,, | Performed by: PHYSICIAN ASSISTANT

## 2024-01-26 PROCEDURE — A4216 STERILE WATER/SALINE, 10 ML: HCPCS | Performed by: EMERGENCY MEDICINE

## 2024-01-26 RX ADMIN — PANTOPRAZOLE SODIUM 40 MG: 40 INJECTION, POWDER, FOR SOLUTION INTRAVENOUS at 08:01

## 2024-01-26 RX ADMIN — SODIUM CHLORIDE 10 ML: 9 INJECTION INTRAMUSCULAR; INTRAVENOUS; SUBCUTANEOUS at 06:01

## 2024-01-26 NOTE — SUBJECTIVE & OBJECTIVE
Past Medical History:   Diagnosis Date    Anticoagulant long-term use     Aortic stenosis     Asthma     Benign prostatic hyperplasia with nocturia     Bilateral carotid artery stenosis 07/21/2021    US CAROTID BILATERAL 07/14/2021 IMPRESSION: Atherosclerosis with suspected stenosis greater than 50% at the right proximal ICA visually. Velocities are discordant and appear improved from prior. Atherosclerosis on the left with no evidence of flow-limiting stenosis greater than 50%.  FINDINGS: Right: Internal Carotid Artery (ICA): Peak systolic velocity 118 cm/sec. 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 09/22/2020    DELONTE on CPAP     Osteoarthritis     Paroxysmal atrial fibrillation     PVD (peripheral vascular disease)     Secondary esophageal varices without bleeding 06/26/2017    Type 2 diabetes mellitus        Past Surgical History:   Procedure Laterality Date    ANGIOGRAM, EXTREMITY, UNILATERAL Left 1/4/2024    Procedure: ANGIOGRAM, EXTREMITY, UNILATERAL- Femoral / SMS Stent, Poss General;  Surgeon: ALEX Alfred III, MD;  Location: [facility] OR [address] [place] [address];  Service: Vascular;  Laterality: Left;  mGy : 2698.78  Gy.cm : 229.88  Contrast : 62ml  Fluro time : 13.8min    CARDIAC CATHETERIZATION      CARDIAC SURGERY      CARPAL TUNNEL RELEASE Left     CATARACT EXTRACTION      CATARACT EXTRACTION W/ INTRAOCULAR LENS  IMPLANT, BILATERAL  2008    CATHETERIZATION OF BOTH LEFT AND RIGHT HEART N/A 11/2/2018    Procedure: CATHETERIZATION, HEART, BOTH  LEFT AND RIGHT;  Surgeon: Frank Gallardo MD;  Location: [facility] CATH LAB;  Service: Cardiology;  Laterality: N/A;    COLONOSCOPY  8/2013    COLONOSCOPY N/A 5/30/2016    Procedure: COLONOSCOPY;  Surgeon: Anna Tomas MD;  Location: [facility] ENDO;  Service: Endoscopy;  Laterality: N/A;    COLONOSCOPY N/A 7/17/2019    Procedure: COLONOSCOPY;  Surgeon: David Carter III, MD;  Location: [facility] ENDO;  Service: Endoscopy;  Laterality: N/A;    COLONOSCOPY N/A 12/14/2023    Procedure: COLONOSCOPY;  Surgeon: Ama Barrera MD;  Location: [facility] ENDO;  Service: Endoscopy;  Laterality: N/A;    COMPUTED TOMOGRAPHY N/A 9/9/2019    Procedure: CT (COMPUTED TOMOGRAPHY);  Surgeon: [facility];  Location: [facility];  Service: General;  Laterality: N/A;  Microwave ablation to be done in CT.  Need CRNA and cart    COMPUTED TOMOGRAPHY N/A 1/25/2022    Procedure: Liver Microwave Ablation;  Surgeon: Red Puentes MD;  Location: [facility];  Service: General;  Laterality: N/A;    CORONARY ARTERY BYPASS GRAFT (CABG) N/A 11/5/2018    Procedure: CORONARY ARTERY BYPASS GRAFT (CABG);  Surgeon: Bear De Luna MD;  Location: [facility];  Service: Cardiothoracic;  Laterality: N/A;  TWO VESSEL BYPASS WITH AORTOTOMY AND EXCISION OF ATHEROSCLEROTIC PLAQUE    CORONARY BYPASS GRAFT ANGIOGRAPHY  3/2/2020    Procedure: Bypass graft study;  Surgeon: Cora Cormier MD;  Location: [facility] CATH LAB;  Service: Cardiology;;    ELBOW BURSA SURGERY Right 2010    ENDOSCOPIC HARVEST OF VEIN Left 11/5/2018    Procedure: SURGICAL PROCUREMENT, VEIN, ENDOSCOPIC;  Surgeon: Bear De Luna MD;  Location: [facility];  Service: Cardiothoracic;  Laterality: Left;    ESOPHAGOGASTRODUODENOSCOPY N/A 7/17/2019    Procedure: ESOPHAGOGASTRODUODENOSCOPY (EGD);  Surgeon: David Carter III, MD;  Location: [facility];  Service: Endoscopy;  Laterality: N/A;    ESOPHAGOGASTRODUODENOSCOPY N/A 12/29/2022    Procedure: ESOPHAGOGASTRODUODENOSCOPY (EGD);  Surgeon: Issa CARLSON  MD Irwin;  Location: [facility] ENDO;  Service: Endoscopy;  Laterality: N/A;    ESOPHAGOGASTRODUODENOSCOPY N/A 1/17/2024    Procedure: EGD (ESOPHAGOGASTRODUODENOSCOPY);  Surgeon: Issa Gayle MD;  Location: [facility] ENDO;  Service: Endoscopy;  Laterality: N/A;    ETHMOIDECTOMY Bilateral 3/27/2019    Procedure: ETHMOIDECTOMY;  Surgeon: Alejandro Parkinson MD;  Location: [facility] OR;  Service: ENT;  Laterality: Bilateral;    EYE SURGERY      FOOT SURGERY      FRONTAL SINUS OBLITERATION Bilateral 3/27/2019    Procedure: SINUSOTOMY, FRONTAL SINUS, OBLITERATIVE;  Surgeon: Alejandro Parkinson MD;  Location: [facility] OR;  Service: ENT;  Laterality: Bilateral;    FUNCTIONAL ENDOSCOPIC SINUS SURGERY (FESS) Bilateral 3/27/2019    Procedure: FESS (FUNCTIONAL ENDOSCOPIC SINUS SURGERY);  Surgeon: Alejandro Parkinson MD;  Location: [facility] OR;  Service: ENT;  Laterality: Bilateral;  Left Keila bullosa resection     KNEE ARTHROSCOPY W/ MENISCAL REPAIR Left 06/2019    dr boykin    KNEE SURGERY Right     LEFT HEART CATHETERIZATION Left 3/2/2020    Procedure: CATHETERIZATION, HEART, LEFT;  Surgeon: Cora Cormier MD;  Location: [facility] CATH LAB;  Service: Cardiology;  Laterality: Left;    LIVER BIOPSY  01/2022    MAXILLARY ANTROSTOMY Bilateral 3/27/2019    Procedure: MAXILLARY ANTROSTOMY;  Surgeon: Alejandro Parkinson MD;  Location: [facility] OR;  Service: ENT;  Laterality: Bilateral;    NASAL SEPTOPLASTY N/A 3/27/2019    Procedure: SEPTOPLASTY, NOSE;  Surgeon: Alejandro Parkinson MD;  Location: [facility] OR;  Service: ENT;  Laterality: N/A;    RECTAL SURGERY  2012    STENT, SUPERIOR MESENTERIC ARTERY Left 1/4/2024    Procedure: STENT, SUPERIOR MESENTERIC ARTERY;  Surgeon: ALEX Alfred III, MD;  Location: [address];  Service: Vascular;  Laterality: Left;    TRANSURETHRAL RESECTION OF PROSTATE (TURP) WITHOUT USE OF LASER N/A 6/19/2018    Procedure: TURP, WITHOUT USING LASER;  Surgeon: Cooper Cordova IV, MD;  Location: [facility] OR;  Service: Urology;  Laterality: N/A;    TRIGGER FINGER  RELEASE Left        Review of patient's allergies indicates:   Allergen Reactions    Lipitor [atorvastatin] Other (See Comments)     Muscle aches and pains as well as fatigue.     Niacin preparations Other (See Comments)     Causes broken blood vessels and bruises at 4 times normal dose.    Statins-hmg-coa reductase inhibitors Other (See Comments)     Statins cause intolerable myalgias.    Iodinated contrast media Hives     Tachycardia    Omeprazole Hives and Itching    Prilosec [omeprazole magnesium] Hives and Itching     Family History       Problem Relation (Age of Onset)    Heart disease Mother, Father, Brother          Tobacco Use    Smoking status: Former     Current packs/day: 0.00     Average packs/day: 0.5 packs/day for 4.0 years (2.0 ttl pk-yrs)     Types: Cigarettes     Start date: 1968     Quit date: 1972     Years since quittin.6    Smokeless tobacco: Former     Types: Chew     Quit date: 1976   Substance and Sexual Activity    Alcohol use: Yes     Alcohol/week: 2.0 standard drinks of alcohol     Types: 2 Cans of beer per week    Drug use: No    Sexual activity: Yes     Partners: Female     Review of Systems   Constitutional:  Negative for fever.   HENT:  Negative for hearing loss.    Eyes:  Negative for visual disturbance.   Respiratory:  Negative for cough and shortness of breath.    Cardiovascular:  Negative for chest pain and palpitations.   Gastrointestinal:         As per HPI.   Genitourinary:  Negative for difficulty urinating, dysuria, frequency and hematuria.   Musculoskeletal:  Negative for arthralgias and back pain.   Skin:  Negative for color change and rash.   Neurological:  Negative for seizures, syncope, weakness, numbness and headaches.   Hematological:  Does not bruise/bleed easily.   Psychiatric/Behavioral:  The patient is not nervous/anxious.      Objective:     Vital Signs (Most Recent):  Temp: 98.9 °F (37.2 °C) (24 1015)  Pulse: 72 (24 0816)  Resp:  18 (01/25/24 1448)  BP: 137/80 (01/26/24 0816)  SpO2: 98 % (01/26/24 0816) Vital Signs (24h Range):  Temp:  [98.9 °F (37.2 °C)] 98.9 °F (37.2 °C)  Pulse:  [69-94] 72  Resp:  [18-20] 18  SpO2:  [95 %-100 %] 98 %  BP: (106-167)/(56-83) 137/80     Weight: 101.5 kg (223 lb 14 oz) (01/25/24 1015)  Body mass index is 35.06 kg/m².    No intake or output data in the 24 hours ending 01/26/24 0847    Lines/Drains/Airways       Peripheral Intravenous Line  Duration                  Peripheral IV - Single Lumen 01/25/24 1150 20 G Posterior;Right Hand <1 day                     Physical Exam  Constitutional:       General: He is not in acute distress.     Appearance: Normal appearance. He is well-developed.   HENT:      Head: Normocephalic and atraumatic.   Eyes:      Extraocular Movements: Extraocular movements intact.   Cardiovascular:      Rate and Rhythm: Normal rate and regular rhythm.      Heart sounds: Normal heart sounds. No murmur heard.  Pulmonary:      Effort: Pulmonary effort is normal. No respiratory distress.      Breath sounds: Normal breath sounds. No wheezing.   Abdominal:      General: Bowel sounds are normal. There is no distension.      Palpations: Abdomen is soft. There is no mass.      Tenderness: There is no abdominal tenderness.   Genitourinary:     Comments: Rectal exam revealed a skin tag. No hemorrhoids. There is dark brown stool on the skin and in the vault. No internal palpable mass.   Musculoskeletal:      Cervical back: Normal range of motion and neck supple.      Right lower leg: No edema.      Left lower leg: No edema.   Skin:     General: Skin is warm and dry.      Findings: No rash.   Neurological:      Mental Status: He is alert and oriented to person, place, and time.      Cranial Nerves: No cranial nerve deficit.   Psychiatric:         Behavior: Behavior normal.          Significant Labs:  CBC:   Recent Labs   Lab 01/24/24  1040 01/25/24  1143 01/26/24  0728   WBC 5.64 4.79 3.64*   HGB 8.4*  8.4* 8.4*   HCT 26.2* 26.1* 26.5*    229 204     CMP:   Recent Labs   Lab 01/25/24  1143   *   CALCIUM 9.3   ALBUMIN 3.7   PROT 7.8      K 4.0   CO2 24      BUN 20   CREATININE 1.1   ALKPHOS 80   ALT 21   AST 22   BILITOT 0.3     Coagulation:   Recent Labs   Lab 01/25/24  1143   INR 1.1       Significant Imaging:  Imaging results within the past 24 hours have been reviewed.

## 2024-01-26 NOTE — SUBJECTIVE & OBJECTIVE
Past Medical History:   Diagnosis Date    Anticoagulant long-term use     Aortic stenosis     Asthma     Benign prostatic hyperplasia with nocturia     Bilateral carotid artery stenosis 07/21/2021    US CAROTID BILATERAL 07/14/2021 IMPRESSION: Atherosclerosis with suspected stenosis greater than 50% at the right proximal ICA visually. Velocities are discordant and appear improved from prior. Atherosclerosis on the left with no evidence of flow-limiting stenosis greater than 50%.  FINDINGS: Right: Internal Carotid Artery (ICA): Peak systolic velocity 118 cm/sec. 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 09/22/2020    DELONTE on CPAP     Osteoarthritis     Paroxysmal atrial fibrillation     PVD (peripheral vascular disease)     Secondary esophageal varices without bleeding 06/26/2017    Type 2 diabetes mellitus        Past Surgical History:   Procedure Laterality Date    ANGIOGRAM, EXTREMITY, UNILATERAL Left 1/4/2024    Procedure: ANGIOGRAM, EXTREMITY, UNILATERAL- Femoral / SMS Stent, Poss General;  Surgeon: ALEX Alfred III, MD;  Location: [facility] OR [address] [place] [address];  Service: Vascular;  Laterality: Left;  mGy : 2698.78  Gy.cm : 229.88  Contrast : 62ml  Fluro time : 13.8min    CARDIAC CATHETERIZATION      CARDIAC SURGERY      CARPAL TUNNEL RELEASE Left     CATARACT EXTRACTION      CATARACT EXTRACTION W/ INTRAOCULAR LENS  IMPLANT, BILATERAL  2008    CATHETERIZATION OF BOTH LEFT AND RIGHT HEART N/A 11/2/2018    Procedure: CATHETERIZATION, HEART, BOTH  LEFT AND RIGHT;  Surgeon: Frank Gallardo MD;  Location: [facility] CATH LAB;  Service: Cardiology;  Laterality: N/A;    COLONOSCOPY  8/2013    COLONOSCOPY N/A 5/30/2016    Procedure: COLONOSCOPY;  Surgeon: Anna Tomas MD;  Location: [facility] ENDO;  Service: Endoscopy;  Laterality: N/A;    COLONOSCOPY N/A 7/17/2019    Procedure: COLONOSCOPY;  Surgeon: David Carter III, MD;  Location: [facility] ENDO;  Service: Endoscopy;  Laterality: N/A;    COLONOSCOPY N/A 12/14/2023    Procedure: COLONOSCOPY;  Surgeon: Ama Barrera MD;  Location: [facility] ENDO;  Service: Endoscopy;  Laterality: N/A;    COMPUTED TOMOGRAPHY N/A 9/9/2019    Procedure: CT (COMPUTED TOMOGRAPHY);  Surgeon: [facility] Provider;  Location: [facility];  Service: General;  Laterality: N/A;  Microwave ablation to be done in CT.  Need CRNA and cart    COMPUTED TOMOGRAPHY N/A 1/25/2022    Procedure: Liver Microwave Ablation;  Surgeon: eRd Puentes MD;  Location: [facility];  Service: General;  Laterality: N/A;    CORONARY ARTERY BYPASS GRAFT (CABG) N/A 11/5/2018    Procedure: CORONARY ARTERY BYPASS GRAFT (CABG);  Surgeon: Bear De Luna MD;  Location: [facility];  Service: Cardiothoracic;  Laterality: N/A;  TWO VESSEL BYPASS WITH AORTOTOMY AND EXCISION OF ATHEROSCLEROTIC PLAQUE    CORONARY BYPASS GRAFT ANGIOGRAPHY  3/2/2020    Procedure: Bypass graft study;  Surgeon: Cora Cormier MD;  Location: [facility] CATH LAB;  Service: Cardiology;;    ELBOW BURSA SURGERY Right 2010    ENDOSCOPIC HARVEST OF VEIN Left 11/5/2018    Procedure: SURGICAL PROCUREMENT, VEIN, ENDOSCOPIC;  Surgeon: Bear De Luna MD;  Location: [facility];  Service: Cardiothoracic;  Laterality: Left;    ESOPHAGOGASTRODUODENOSCOPY N/A 7/17/2019    Procedure: ESOPHAGOGASTRODUODENOSCOPY (EGD);  Surgeon: David Carter III, MD;  Location: [facility];  Service: Endoscopy;  Laterality: N/A;    ESOPHAGOGASTRODUODENOSCOPY N/A 12/29/2022    Procedure: ESOPHAGOGASTRODUODENOSCOPY (EGD);  Surgeon: Issa CARLSON  MD Irwin;  Location: [facility] ENDO;  Service: Endoscopy;  Laterality: N/A;    ESOPHAGOGASTRODUODENOSCOPY N/A 1/17/2024    Procedure: EGD (ESOPHAGOGASTRODUODENOSCOPY);  Surgeon: Issa Gayle MD;  Location: [facility] ENDO;  Service: Endoscopy;  Laterality: N/A;    ETHMOIDECTOMY Bilateral 3/27/2019    Procedure: ETHMOIDECTOMY;  Surgeon: Alejandro Parkinson MD;  Location: [facility] OR;  Service: ENT;  Laterality: Bilateral;    EYE SURGERY      FOOT SURGERY      FRONTAL SINUS OBLITERATION Bilateral 3/27/2019    Procedure: SINUSOTOMY, FRONTAL SINUS, OBLITERATIVE;  Surgeon: Alejandro Parkinson MD;  Location: [facility] OR;  Service: ENT;  Laterality: Bilateral;    FUNCTIONAL ENDOSCOPIC SINUS SURGERY (FESS) Bilateral 3/27/2019    Procedure: FESS (FUNCTIONAL ENDOSCOPIC SINUS SURGERY);  Surgeon: Alejandro Parkinson MD;  Location: [facility] OR;  Service: ENT;  Laterality: Bilateral;  Left Keila bullosa resection     KNEE ARTHROSCOPY W/ MENISCAL REPAIR Left 06/2019    dr boykin    KNEE SURGERY Right     LEFT HEART CATHETERIZATION Left 3/2/2020    Procedure: CATHETERIZATION, HEART, LEFT;  Surgeon: Cora Cormier MD;  Location: [facility] CATH LAB;  Service: Cardiology;  Laterality: Left;    LIVER BIOPSY  01/2022    MAXILLARY ANTROSTOMY Bilateral 3/27/2019    Procedure: MAXILLARY ANTROSTOMY;  Surgeon: Alejandro Parkinson MD;  Location: [facility] OR;  Service: ENT;  Laterality: Bilateral;    NASAL SEPTOPLASTY N/A 3/27/2019    Procedure: SEPTOPLASTY, NOSE;  Surgeon: Alejandro Parkinson MD;  Location: [facility] OR;  Service: ENT;  Laterality: N/A;    RECTAL SURGERY  2012    STENT, SUPERIOR MESENTERIC ARTERY Left 1/4/2024    Procedure: STENT, SUPERIOR MESENTERIC ARTERY;  Surgeon: ALEX Alfred III, MD;  Location: [address];  Service: Vascular;  Laterality: Left;    TRANSURETHRAL RESECTION OF PROSTATE (TURP) WITHOUT USE OF LASER N/A 6/19/2018    Procedure: TURP, WITHOUT USING LASER;  Surgeon: Cooper Cordova IV, MD;  Location: [facility] OR;  Service: Urology;  Laterality: N/A;    TRIGGER FINGER  RELEASE Left        Review of patient's allergies indicates:   Allergen Reactions    Lipitor [atorvastatin] Other (See Comments)     Muscle aches and pains as well as fatigue.     Niacin preparations Other (See Comments)     Causes broken blood vessels and bruises at 4 times normal dose.    Statins-hmg-coa reductase inhibitors Other (See Comments)     Statins cause intolerable myalgias.    Iodinated contrast media Hives     Tachycardia    Omeprazole Hives and Itching    Prilosec [omeprazole magnesium] Hives and Itching       Current Facility-Administered Medications on File Prior to Encounter   Medication    lactated ringers infusion     Current Outpatient Medications on File Prior to Encounter   Medication Sig    albuterol (VENTOLIN HFA) 90 mcg/actuation inhaler Inhale 2 puffs into the lungs every 6 (six) hours as needed for Wheezing or Shortness of Breath. Rescue    amLODIPine (NORVASC) 10 MG tablet TAKE 1 TABLET BY MOUTH ONCE A DAY (DOSE INCREASE) (Patient taking differently: Take by mouth every morning.)    aspirin (ECOTRIN) 81 MG EC tablet Take 81 mg by mouth every morning.    budesonide-formoterol 160-4.5 mcg (SYMBICORT) 160-4.5 mcg/actuation HFAA INHALE 2 PUFFS INTO THE LUNGS TWO TIMES A DAY.    carvediloL (COREG) 3.125 MG tablet Take 1 tablet (3.125 mg total) by mouth 2 (two) times daily with meals.    empagliflozin (JARDIANCE) 25 mg tablet Take 1 tablet (25 mg total) by mouth once daily. (Patient taking differently: Take 25 mg by mouth every morning.)    famotidine (PEPCID) 40 MG tablet TAKE 1 TABLET BY MOUTH EVERY EVENING    finasteride (PROSCAR) 5 mg tablet TAKE 1 TABLET BY MOUTH once daily    furosemide (LASIX) 40 MG tablet Take 1 tablet (40 mg total) by mouth 2 (two) times daily.    krill oil 500 mg Cap Take by mouth.    levothyroxine (SYNTHROID) 300 MCG Tab Take 1 tablet (300 mcg total) by mouth every morning.    lisinopriL 10 MG tablet TAKE 1 BY MOUTH EVERY DAY (Patient taking differently: Take 10 mg  "by mouth every morning.)    predniSONE (DELTASONE) 50 MG Tab Take 50 mg by mouth 3 (three) times daily as needed. Patient is to take 3 prednisone the day of a CAT scan 13 hours prior, 7 hours prior and and hour prior to the CAT scan    ACCU-CHEK GRACE PLUS METER Misc AS DIRECTED    azelastine (ASTELIN) 137 mcg (0.1 %) nasal spray 2 sprays (274 mcg total) by Nasal route 2 (two) times daily. (Patient not taking: Reported on 2024)    blood sugar diagnostic (ACCU-CHEK GRACE PLUS TEST STRP) Strp TEST THREE TIMES A DAY    diphenhydrAMINE (BENADRYL) 50 MG capsule Take 50mg by mouth 1 hour before contrast administration.    fluticasone propionate (FLONASE) 50 mcg/actuation nasal spray 2 sprays (100 mcg total) by Each Nostril route once daily. (Patient not taking: Reported on 2024)    GLUCOSAMINE HCL/CHONDR ROSARIO A NA (OSTEO BI-FLEX ORAL) Take 1 tablet by mouth 2 (two) times daily.     insulin (LANTUS SOLOSTAR U-100 INSULIN) glargine 100 units/mL SubQ pen Inject 44 Units into the skin every evening.    ipratropium (ATROVENT) 21 mcg (0.03 %) nasal spray 2 sprays by Each Nostril route 2 (two) times daily.    lancets (ACCU-CHEK FASTCLIX LANCET DRUM) Misc TEST TWO TIMES A DAY    levocetirizine (XYZAL) 5 MG tablet Take 1 tablet (5 mg total) by mouth every evening.    meloxicam (MOBIC) 15 MG tablet Take 15 mg by mouth.    metFORMIN (GLUCOPHAGE-XR) 500 MG ER 24hr tablet Take 1 tablet (500 mg total) by mouth 2 (two) times daily with meals.    montelukast (SINGULAIR) 10 mg tablet Take 1 tablet (10 mg total) by mouth once daily. (Patient taking differently: Take 10 mg by mouth every evening.)    pen needle, diabetic (BD ULTRA-FINE MINI PEN NEEDLE) 31 gauge x 3/16" Ndle USE AS DIRECTED    propafenone (RHTHYMOL) 150 MG Tab Take 1 tablet (150 mg total) by mouth every 8 (eight) hours.    RABEprazole (ACIPHEX) 20 mg tablet Take 1 tablet (20 mg total) by mouth 2 (two) times daily.    REPATHA SURECLICK 140 mg/mL Juan J SMARTSI " Milligram(s) Topical Every 2 Weeks    rivaroxaban (XARELTO) 20 mg Tab Take 1 tablet (20 mg total) by mouth daily with dinner or evening meal.    sildenafiL (VIAGRA) 100 MG tablet Take 1/2 to 1 tablet by mouth one hour prior to intercourse. Max 100mg per day    [DISCONTINUED] acetaminophen (TYLENOL) 325 MG tablet Take 2 tablets (650 mg total) by mouth every 8 (eight) hours as needed for Pain. (Patient not taking: Reported on 2024)    [DISCONTINUED] blood sugar diagnostic (ACCU-CHEK GRACE PLUS TEST STRP) Strp TEST THREE TIMES A DAY     Family History       Problem Relation (Age of Onset)    Heart disease Mother, Father, Brother          Tobacco Use    Smoking status: Former     Current packs/day: 0.00     Average packs/day: 0.5 packs/day for 4.0 years (2.0 ttl pk-yrs)     Types: Cigarettes     Start date: 1968     Quit date: 1972     Years since quittin.6    Smokeless tobacco: Former     Types: Chew     Quit date: 1976   Substance and Sexual Activity    Alcohol use: Yes     Alcohol/week: 2.0 standard drinks of alcohol     Types: 2 Cans of beer per week    Drug use: No    Sexual activity: Yes     Partners: Female     Review of Systems   Constitutional:  Positive for activity change and fatigue. Negative for diaphoresis and fever.   HENT: Negative.     Eyes: Negative.    Respiratory: Negative.     Cardiovascular: Negative.    Gastrointestinal:  Positive for blood in stool. Negative for abdominal distention and abdominal pain.   Endocrine: Negative.    Genitourinary: Negative.    Musculoskeletal: Negative.    Allergic/Immunologic: Negative.    Neurological: Negative.    Hematological: Negative.    Psychiatric/Behavioral: Negative.       Objective:     Vital Signs (Most Recent):  Temp: 98.9 °F (37.2 °C) (24 1015)  Pulse: 82 (24 194)  Resp: 18 (24 1448)  BP: (!) 167/83 (24)  SpO2: 100 % (24) Vital Signs (24h Range):  Temp:  [98.9 °F (37.2 °C)] 98.9 °F (37.2  °C)  Pulse:  [76-94] 82  Resp:  [18-20] 18  SpO2:  [95 %-100 %] 100 %  BP: (106-167)/(58-83) 167/83     Weight: 101.5 kg (223 lb 14 oz)  Body mass index is 35.06 kg/m².     Physical Exam  Vitals and nursing note reviewed.   Constitutional:       General: He is not in acute distress.     Appearance: He is well-developed. He is obese. He is not ill-appearing or diaphoretic.   HENT:      Head: Normocephalic and atraumatic.      Right Ear: External ear normal.      Left Ear: External ear normal.      Nose: Nose normal. No congestion.      Mouth/Throat:      Mouth: Mucous membranes are moist.      Pharynx: Oropharynx is clear.      Comments: Very narrow oropharynx  Eyes:      General: No scleral icterus.     Conjunctiva/sclera: Conjunctivae normal.      Pupils: Pupils are equal, round, and reactive to light.   Cardiovascular:      Rate and Rhythm: Normal rate and regular rhythm.      Pulses: Normal pulses.      Heart sounds: Normal heart sounds.   Pulmonary:      Effort: Pulmonary effort is normal. No respiratory distress.      Breath sounds: Normal breath sounds. No wheezing or rales.   Abdominal:      General: Abdomen is flat. Bowel sounds are normal. There is no distension.      Palpations: Abdomen is soft.      Tenderness: There is no abdominal tenderness. There is no guarding.   Genitourinary:     Rectum: Normal. Guaiac result positive.      Comments: deferred  Musculoskeletal:         General: Normal range of motion.      Cervical back: Normal range of motion and neck supple.   Skin:     General: Skin is warm and dry.      Capillary Refill: Capillary refill takes less than 2 seconds.      Coloration: Skin is not jaundiced.   Neurological:      General: No focal deficit present.      Mental Status: He is alert and oriented to person, place, and time.      Deep Tendon Reflexes: Reflexes are normal and symmetric.   Psychiatric:         Mood and Affect: Mood normal.         Behavior: Behavior normal.         Thought  Content: Thought content normal.         Judgment: Judgment normal.          CRANIAL NERVES     CN III, IV, VI   Pupils are equal, round, and reactive to light.     Results for orders placed or performed during the hospital encounter of 01/25/24   CBC auto differential   Result Value Ref Range    WBC 4.79 3.90 - 12.70 K/uL    RBC 2.97 (L) 4.60 - 6.20 M/uL    Hemoglobin 8.4 (L) 14.0 - 18.0 g/dL    Hematocrit 26.1 (L) 40.0 - 54.0 %    MCV 88 82 - 98 fL    MCH 28.3 27.0 - 31.0 pg    MCHC 32.2 32.0 - 36.0 g/dL    RDW 14.1 11.5 - 14.5 %    Platelets 229 150 - 450 K/uL    MPV 9.9 9.2 - 12.9 fL    Immature Granulocytes 0.2 0.0 - 0.5 %    Gran # (ANC) 2.9 1.8 - 7.7 K/uL    Immature Grans (Abs) 0.01 0.00 - 0.04 K/uL    Lymph # 1.3 1.0 - 4.8 K/uL    Mono # 0.4 0.3 - 1.0 K/uL    Eos # 0.1 0.0 - 0.5 K/uL    Baso # 0.03 0.00 - 0.20 K/uL    nRBC 0 0 /100 WBC    Gran % 61.0 38.0 - 73.0 %    Lymph % 26.1 18.0 - 48.0 %    Mono % 9.2 4.0 - 15.0 %    Eosinophil % 2.9 0.0 - 8.0 %    Basophil % 0.6 0.0 - 1.9 %    Differential Method Automated    Comprehensive metabolic panel   Result Value Ref Range    Sodium 138 136 - 145 mmol/L    Potassium 4.0 3.5 - 5.1 mmol/L    Chloride 106 95 - 110 mmol/L    CO2 24 23 - 29 mmol/L    Glucose 136 (H) 70 - 110 mg/dL    BUN 20 8 - 23 mg/dL    Creatinine 1.1 0.5 - 1.4 mg/dL    Calcium 9.3 8.7 - 10.5 mg/dL    Total Protein 7.8 6.0 - 8.4 g/dL    Albumin 3.7 3.5 - 5.2 g/dL    Total Bilirubin 0.3 0.1 - 1.0 mg/dL    Alkaline Phosphatase 80 55 - 135 U/L    AST 22 10 - 40 U/L    ALT 21 10 - 44 U/L    eGFR >60 >60 mL/min/1.73 m^2    Anion Gap 8 8 - 16 mmol/L   Protime-INR   Result Value Ref Range    Prothrombin Time 11.3 9.0 - 12.5 sec    INR 1.1 0.8 - 1.2   Occult blood x 1, stool    Specimen: Stool   Result Value Ref Range    Occult Blood Positive (A) Negative   Type & Screen   Result Value Ref Range    Group & Rh O POS     Indirect Rock NEG     Specimen Outdate 01/28/2024 23:59      *Note: Due to a large  number of results and/or encounters for the requested time period, some results have not been displayed. A complete set of results can be found in Results Review.        Significant Labs: All pertinent labs within the past 24 hours have been reviewed.    Imaging Results    None        Significant Imaging: I have reviewed all pertinent imaging results/findings within the past 24 hours.

## 2024-01-26 NOTE — ASSESSMENT & PLAN NOTE
Patient's anemia is currently uncontrolled. Has not received any PRBCs to date. Etiology likely d/t   Current CBC reviewed-   Lab Results   Component Value Date    HGB 8.4 (L) 01/25/2024    HCT 26.1 (L) 01/25/2024     Monitor serial CBC and transfuse if patient becomes hemodynamically unstable, symptomatic or H/H drops below 7/21.

## 2024-01-26 NOTE — PHARMACY MED REC
"Admission Medication History     The home medication history was taken by Rosie Howell.    You may go to "Admission" then "Reconcile Home Medications" tabs to review and/or act upon these items.     The home medication list has been updated by the Pharmacy department.   Please read ALL comments highlighted in yellow.   Please address this information as you see fit.    Feel free to contact us if you have any questions or require assistance.      The medications listed below were removed from the home medication list. Please reorder if appropriate:  Patient reports no longer taking the following medication(s):  Tylenol 325 mg        Medications listed below were obtained from: Patient/family and Analytic software- [email or web address],patient brought medication list from home along with when he takes them  (Not in a hospital admission)      LAST MED REC COMPLETED:     Rosie Howell  [id number]      Current Outpatient Medications on File Prior to Encounter   Medication Sig Dispense Refill Last Dose    albuterol (VENTOLIN HFA) 90 mcg/actuation inhaler Inhale 2 puffs into the lungs every 6 (six) hours as needed for Wheezing or Shortness of Breath. Rescue 18 g 3 1/24/2024    amLODIPine (NORVASC) 10 MG tablet TAKE 1 TABLET BY MOUTH ONCE A DAY (DOSE INCREASE) (Patient taking differently: Take by mouth every morning.) 30 tablet 11 1/24/2024    aspirin (ECOTRIN) 81 MG EC tablet Take 81 mg by mouth every morning.   1/24/2024    budesonide-formoterol 160-4.5 mcg (SYMBICORT) 160-4.5 mcg/actuation HFAA INHALE 2 PUFFS INTO THE LUNGS TWO TIMES A DAY. 10.2 g 11 1/25/2024    carvediloL (COREG) 3.125 MG tablet Take 1 tablet (3.125 mg total) by mouth 2 (two) times daily with meals. 60 tablet 5 1/25/2024    empagliflozin (JARDIANCE) 25 mg tablet Take 1 tablet (25 mg total) by mouth once daily. (Patient taking differently: Take 25 mg by mouth every morning.) 90 tablet 1 1/25/2024    famotidine (PEPCID) 40 MG tablet TAKE 1 TABLET BY MOUTH " EVERY EVENING 30 tablet 11 1/24/2024    finasteride (PROSCAR) 5 mg tablet TAKE 1 TABLET BY MOUTH once daily 90 tablet 2 1/24/2024    furosemide (LASIX) 40 MG tablet Take 1 tablet (40 mg total) by mouth 2 (two) times daily. 60 tablet 11 1/25/2024    krill oil 500 mg Cap Take by mouth.   1/25/2024    levothyroxine (SYNTHROID) 300 MCG Tab Take 1 tablet (300 mcg total) by mouth every morning. 90 tablet 3 1/25/2024    lisinopriL 10 MG tablet TAKE 1 BY MOUTH EVERY DAY (Patient taking differently: Take 10 mg by mouth every morning.) 30 tablet 7 1/25/2024    predniSONE (DELTASONE) 50 MG Tab Take 50 mg by mouth 3 (three) times daily as needed. Patient is to take 3 prednisone the day of a CAT scan 13 hours prior, 7 hours prior and and hour prior to the CAT scan       ACCU-CHEK GRACE PLUS METER Misc AS DIRECTED 1 each 0     azelastine (ASTELIN) 137 mcg (0.1 %) nasal spray 2 sprays (274 mcg total) by Nasal route 2 (two) times daily. (Patient not taking: Reported on 1/25/2024) 30 mL 6 Not Taking    blood sugar diagnostic (ACCU-CHEK GRACE PLUS TEST STRP) Strp TEST THREE TIMES A  strip 11     diphenhydrAMINE (BENADRYL) 50 MG capsule Take 50mg by mouth 1 hour before contrast administration. 1 capsule 0 Unknown    fluticasone propionate (FLONASE) 50 mcg/actuation nasal spray 2 sprays (100 mcg total) by Each Nostril route once daily. (Patient not taking: Reported on 1/25/2024) 18.2 mL 6 Not Taking    GLUCOSAMINE HCL/CHONDR ROSARIO A NA (OSTEO BI-FLEX ORAL) Take 1 tablet by mouth 2 (two) times daily.        insulin (LANTUS SOLOSTAR U-100 INSULIN) glargine 100 units/mL SubQ pen Inject 44 Units into the skin every evening. 45 mL 1     ipratropium (ATROVENT) 21 mcg (0.03 %) nasal spray 2 sprays by Each Nostril route 2 (two) times daily. 30 mL 0 Unknown    lancets (ACCU-CHEK FASTCLIX LANCET DRUM) Misc TEST TWO TIMES A  each 5     levocetirizine (XYZAL) 5 MG tablet Take 1 tablet (5 mg total) by mouth every evening. 30 tablet 11   "   meloxicam (MOBIC) 15 MG tablet Take 15 mg by mouth.       metFORMIN (GLUCOPHAGE-XR) 500 MG ER 24hr tablet Take 1 tablet (500 mg total) by mouth 2 (two) times daily with meals. 180 tablet 3     montelukast (SINGULAIR) 10 mg tablet Take 1 tablet (10 mg total) by mouth once daily. (Patient taking differently: Take 10 mg by mouth every evening.) 90 tablet 3     pen needle, diabetic (BD ULTRA-FINE MINI PEN NEEDLE) 31 gauge x 3/16" Ndle USE AS DIRECTED 100 each 11     propafenone (RHTHYMOL) 150 MG Tab Take 1 tablet (150 mg total) by mouth every 8 (eight) hours. 90 tablet 11     RABEprazole (ACIPHEX) 20 mg tablet Take 1 tablet (20 mg total) by mouth 2 (two) times daily. 180 tablet 3     REPATHA SURECLICK 140 mg/mL PnIj SMARTSI Milligram(s) Topical Every 2 Weeks       rivaroxaban (XARELTO) 20 mg Tab Take 1 tablet (20 mg total) by mouth daily with dinner or evening meal. 30 tablet 11     sildenafiL (VIAGRA) 100 MG tablet Take 1/2 to 1 tablet by mouth one hour prior to intercourse. Max 100mg per day 30 tablet 11                            .          "

## 2024-01-26 NOTE — ASSESSMENT & PLAN NOTE
Pt on Xarelto for afib and recently had femoral stent for mesenteric stent for bowel ischemia- and then ASA/Plavix added. Then started having melena and underwent EGD 1/17- since then has been off Plavix.   H/H mildly low 8.4/26  All three- ASA, Plavix, Xarelto on hold now  Cont Protonix 40 IV bid  Place in Observation- GI consulted  Clear liquid diet

## 2024-01-26 NOTE — DISCHARGE INSTRUCTIONS
Hold Xarelto and ASA till Sunday. If no black stools or blood, then resume Xarelto on Monday and then resume ASA on Thursday.

## 2024-01-26 NOTE — PLAN OF CARE
[facility] - Med Surg  Discharge Final Note    Primary Care Provider: Bhupinder Callaway MD    Expected Discharge Date: 1/26/2024    Final Discharge Note (most recent)       Final Note - 01/26/24 1000          Final Note    Assessment Type Final Discharge Note     Anticipated Discharge Disposition Home or Self Care     Hospital Resources/Appts/Education Provided Appointments scheduled and added to AVS        Post-Acute Status    Discharge Delays None known at this time                   Contact Info       Bhupinder Callaway MD   Specialty: Internal Medicine, Pediatrics   Relationship: PCP - General    [address]  [place] [address]   Phone: [phone]       Next Steps: Schedule an appointment as soon as possible for a visit in 3 day(s)    Instructions: Hospital follow up          No d/c needs/orders at this time.

## 2024-01-26 NOTE — ASSESSMENT & PLAN NOTE
No evidence of active GI bleeding. Hgb stable overnight. BUN normal. Vitals stable. Stool color dark brown.   No indication for endoscopies at this time.   Recommend Protonix 40 mg daily given recent gastritis.  Avoid NSAIDs like Mobic.   Advance diet as tolerated.   Follow up with Hepatology as outpatient.

## 2024-01-26 NOTE — HPI
The patient presented to the ER with black stools for a few days. He is known to our service. His history is significant for SMA stenting 01/05/2024, liver cirrhosis and HCC (tx'd w/ ablation) with small esophageal varices and gastritis. He was admitted 01/16/24 for the same after starting ASA and Plavix following his stent placement. An EGD (01/17/24) showed grade I esophageal varices without bleeding and gastritis. Today he denies nausea, vomiting, abdominal pain, change in appetite or hematochezia. He last took Xarelto two nights ago (01/24/24). ER work up revealed a Hgb of 8.4 which is unchanged overnight. , INR 1.1, BUN 20, creatinine 1.1. He denies taking Pepto or an iron supplement. He is hemodynamically.     EGD (01/17/24): Grade I esophageal varices without bleeding and gastritis.   Colonoscopy (12/2023): colon polyps and ischemic ulcers which led to the recent SMA stenting.

## 2024-01-26 NOTE — H&P
[facility] - Emergency Dept.  [facility] Medicine  History & Physical    Patient Name: Erendira Pretty  MRN: [id number]  Patient Class: OP- Observation  Admission Date: 1/25/2024  Attending Physician: Felice Peña MD   Primary Care Provider: Bhupinder Callaway MD         Patient information was obtained from patient, past medical records, and ER records.     Subjective:     Principal Problem:Gastrointestinal hemorrhage with melena    Chief Complaint:   Chief Complaint   Patient presents with    Melena     Dark stools x 1 week sent by PCP on xarelto for afib        HPI: 78 y.o. male patient with a PMHx of CAD, COPD, HTN. Presented to the Emergency Department for blood in stool, onset 2 weeks PTA and worsening anemia. 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 Pt reports having dark stool. Symptoms are episodic and moderate in severity. No mitigating or exacerbating factors reported. Associated sxs include intermittent lightheadedness. Patient denies any fever, chills, n/v/d, SOB, CP, weakness, numbness, dizziness, headache, and all other sxs at this time. Pt is currently on Xarelto. Evaluated by Vascular Surgery on 1/23, planned for imaging studies, noted to have worsening anemia at visit, referred to PCP, repeat labs show Hgb: 9.9 on 1/7, decreased to 8.7 on 1/23, and further decline to 8.4 on 1/24. Reports continued dark stools, with worsening dizziness. In the ED, pt was mildly orthostatic, Bun/Cr normal. Pt started on IV Protonix after consultation by the ER with GI service- Dr. King and placed in Obs under HM.     Past Medical History:   Diagnosis Date    Anticoagulant long-term use     Aortic stenosis     Asthma     Benign prostatic hyperplasia with nocturia     Bilateral carotid artery stenosis 07/21/2021    US CAROTID BILATERAL 07/14/2021 IMPRESSION: Atherosclerosis with suspected stenosis greater than 50% at the right proximal ICA visually. Velocities  Landry Norman Patient Age: 89 year old  MESSAGE: Interpreting service used: No      IM/FP- Orders- Order Request-      Type of order being requested: Vaccination- Pneumonia-      Additional Information: Has flu shot appointment with nurse already scheduled on 10/26. Requesting to have these at the same time.     Also asking to receive call back from nurse regarding [facility] locations in [place].     Has this been discussed with the provider previously? No- Route message to provider's clinical support pool      Reason order is needed: Follow Up Testing.      Is the patient currently having any symptoms? No- Has patient discussed this with the provider previously? No- Route message to Provider's Clinical Support Pool               ALLERGIES:  Patient has no known allergies.  Current Outpatient Medications   Medication   • gemfibrozil (LOPID) 600 MG tablet   • lisinopril (ZESTRIL) 20 MG tablet   • cyclobenzaprine (FLEXERIL) 10 MG tablet   • hydrochlorothiazide (HYDRODIURIL) 12.5 MG tablet   • clotrimazole-betamethasone (LOTRISONE) 1-0.05 % cream   • aspirin 81 MG tablet     No current facility-administered medications for this visit.     PHARMACY to use:           Pharmacy preference(s) on file:   [facility] [address] [place] [address] - [address]  [address]  [facility] [address]  Phone: [phone] Fax: [phone]     Pharmacy - [place], [place] - [address] AT Portal to Registered [facility] Sites  [address]  [facility] [address]  Phone: [phone] Fax: [phone]      CALL BACK INFO: Ok to leave response (including medical information) on answering machine      PCP: Simone Wheat MD         INS: Payor: MEDICARE / Plan: PARTA AND B / Product Type: MEDICARE   PATIENT ADDRESS:  [address] [place] [address]   [facility] [address]     are discordant and appear improved from prior. Atherosclerosis on the left with no evidence of flow-limiting stenosis greater than 50%.  FINDINGS: Right: Internal Carotid Artery (ICA): Peak systolic velocity 118 cm/sec. 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 09/22/2020    DELONTE on CPAP     Osteoarthritis     Paroxysmal atrial fibrillation     PVD (peripheral vascular disease)     Secondary esophageal varices without bleeding 06/26/2017    Type 2 diabetes mellitus        Past Surgical History:   Procedure Laterality Date    ANGIOGRAM, EXTREMITY, UNILATERAL Left 1/4/2024    Procedure: ANGIOGRAM, EXTREMITY, UNILATERAL- Femoral / SMS Stent, Poss General;  Surgeon: ALEX Alfred III, MD;  Location: [facility] OR [address] [place] [address];  Service: Vascular;  Laterality: Left;  mGy : 2698.78  Gy.cm : 229.88  Contrast : 62ml  Fluro time : 13.8min    CARDIAC CATHETERIZATION      CARDIAC SURGERY      CARPAL TUNNEL RELEASE Left     CATARACT EXTRACTION      CATARACT EXTRACTION W/ INTRAOCULAR LENS  IMPLANT, BILATERAL  2008    CATHETERIZATION OF BOTH LEFT AND RIGHT HEART N/A 11/2/2018    Procedure: CATHETERIZATION, HEART, BOTH LEFT AND RIGHT;  Surgeon: Frank Lazo MD;  Location: [facility] CATH LAB;  Service: Cardiology;  Laterality: N/A;    COLONOSCOPY  8/2013    COLONOSCOPY N/A 5/30/2016    Procedure: COLONOSCOPY;  Surgeon: Anna Tomas MD;  Location: [facility] ENDO;  Service: Endoscopy;  Laterality: N/A;    COLONOSCOPY N/A 7/17/2019    Procedure: COLONOSCOPY;   Surgeon: David Carter III, MD;  Location: [facility] ENDO;  Service: Endoscopy;  Laterality: N/A;    COLONOSCOPY N/A 12/14/2023    Procedure: COLONOSCOPY;  Surgeon: Ama Barrera MD;  Location: [facility] ENDO;  Service: Endoscopy;  Laterality: N/A;    COMPUTED TOMOGRAPHY N/A 9/9/2019    Procedure: CT (COMPUTED TOMOGRAPHY);  Surgeon: [facility] Provider;  Location: [facility] OR;  Service: General;  Laterality: N/A;  Microwave ablation to be done in CT.  Need CRNA and cart    COMPUTED TOMOGRAPHY N/A 1/25/2022    Procedure: Liver Microwave Ablation;  Surgeon: Red Puentes MD;  Location: [facility] TOM;  Service: General;  Laterality: N/A;    CORONARY ARTERY BYPASS GRAFT (CABG) N/A 11/5/2018    Procedure: CORONARY ARTERY BYPASS GRAFT (CABG);  Surgeon: Bear De Luna MD;  Location: [facility] OR;  Service: Cardiothoracic;  Laterality: N/A;  TWO VESSEL BYPASS WITH AORTOTOMY AND EXCISION OF ATHEROSCLEROTIC PLAQUE    CORONARY BYPASS GRAFT ANGIOGRAPHY  3/2/2020    Procedure: Bypass graft study;  Surgeon: Cora Cormier MD;  Location: [facility] CATH LAB;  Service: Cardiology;;    ELBOW BURSA SURGERY Right 2010    ENDOSCOPIC HARVEST OF VEIN Left 11/5/2018    Procedure: SURGICAL PROCUREMENT, VEIN, ENDOSCOPIC;  Surgeon: Bear De Luna MD;  Location: [facility] OR;  Service: Cardiothoracic;  Laterality: Left;    ESOPHAGOGASTRODUODENOSCOPY N/A 7/17/2019    Procedure: ESOPHAGOGASTRODUODENOSCOPY (EGD);  Surgeon: David Carter III, MD;  Location: [facility];  Service: Endoscopy;  Laterality: N/A;    ESOPHAGOGASTRODUODENOSCOPY N/A 12/29/2022    Procedure: ESOPHAGOGASTRODUODENOSCOPY (EGD);  Surgeon: Issa Gayle MD;  Location: [facility] ENDO;  Service: Endoscopy;  Laterality: N/A;    ESOPHAGOGASTRODUODENOSCOPY N/A 1/17/2024    Procedure: EGD (ESOPHAGOGASTRODUODENOSCOPY);  Surgeon: Issa Gayle MD;  Location: [facility] ENDO;  Service: Endoscopy;  Laterality: N/A;    ETHMOIDECTOMY Bilateral 3/27/2019    Procedure: ETHMOIDECTOMY;  Surgeon:  Alejandro Parkinson MD;  Location: [facility] OR;  Service: ENT;  Laterality: Bilateral;    EYE SURGERY      FOOT SURGERY      FRONTAL SINUS OBLITERATION Bilateral 3/27/2019    Procedure: SINUSOTOMY, FRONTAL SINUS, OBLITERATIVE;  Surgeon: Alejandro Parkinson MD;  Location: [facility] OR;  Service: ENT;  Laterality: Bilateral;    FUNCTIONAL ENDOSCOPIC SINUS SURGERY (FESS) Bilateral 3/27/2019    Procedure: FESS (FUNCTIONAL ENDOSCOPIC SINUS SURGERY);  Surgeon: Alejandro Parkinson MD;  Location: [facility] OR;  Service: ENT;  Laterality: Bilateral;  Left Keila bullosa resection     KNEE ARTHROSCOPY W/ MENISCAL REPAIR Left 06/2019    dr boykin    KNEE SURGERY Right     LEFT HEART CATHETERIZATION Left 3/2/2020    Procedure: CATHETERIZATION, HEART, LEFT;  Surgeon: Cora Cormier MD;  Location: [facility] CATH LAB;  Service: Cardiology;  Laterality: Left;    LIVER BIOPSY  01/2022    MAXILLARY ANTROSTOMY Bilateral 3/27/2019    Procedure: MAXILLARY ANTROSTOMY;  Surgeon: Alejandro Parkinson MD;  Location: [facility] OR;  Service: ENT;  Laterality: Bilateral;    NASAL SEPTOPLASTY N/A 3/27/2019    Procedure: SEPTOPLASTY, NOSE;  Surgeon: Alejandro Parkinson MD;  Location: [facility] OR;  Service: ENT;  Laterality: N/A;    RECTAL SURGERY  2012    STENT, SUPERIOR MESENTERIC ARTERY Left 1/4/2024    Procedure: STENT, SUPERIOR MESENTERIC ARTERY;  Surgeon: ALEX Alfred III, MD;  Location: [address];  Service: Vascular;  Laterality: Left;    TRANSURETHRAL RESECTION OF PROSTATE (TURP) WITHOUT USE OF LASER N/A 6/19/2018    Procedure: TURP, WITHOUT USING LASER;  Surgeon: Cooper Cordova IV, MD;  Location: [facility];  Service: Urology;  Laterality: N/A;    TRIGGER FINGER RELEASE Left        Review of patient's allergies indicates:   Allergen Reactions    Lipitor [atorvastatin] Other (See Comments)     Muscle aches and pains as well as fatigue.     Niacin preparations Other (See Comments)     Causes broken blood vessels and bruises at 4 times normal dose.    Statins-hmg-coa reductase inhibitors Other (See  Comments)     Statins cause intolerable myalgias.    Iodinated contrast media Hives     Tachycardia    Omeprazole Hives and Itching    Prilosec [omeprazole magnesium] Hives and Itching       Current Facility-Administered Medications on File Prior to Encounter   Medication    lactated ringers infusion     Current Outpatient Medications on File Prior to Encounter   Medication Sig    albuterol (VENTOLIN HFA) 90 mcg/actuation inhaler Inhale 2 puffs into the lungs every 6 (six) hours as needed for Wheezing or Shortness of Breath. Rescue    amLODIPine (NORVASC) 10 MG tablet TAKE 1 TABLET BY MOUTH ONCE A DAY (DOSE INCREASE) (Patient taking differently: Take by mouth every morning.)    aspirin (ECOTRIN) 81 MG EC tablet Take 81 mg by mouth every morning.    budesonide-formoterol 160-4.5 mcg (SYMBICORT) 160-4.5 mcg/actuation HFAA INHALE 2 PUFFS INTO THE LUNGS TWO TIMES A DAY.    carvediloL (COREG) 3.125 MG tablet Take 1 tablet (3.125 mg total) by mouth 2 (two) times daily with meals.    empagliflozin (JARDIANCE) 25 mg tablet Take 1 tablet (25 mg total) by mouth once daily. (Patient taking differently: Take 25 mg by mouth every morning.)    famotidine (PEPCID) 40 MG tablet TAKE 1 TABLET BY MOUTH EVERY EVENING    finasteride (PROSCAR) 5 mg tablet TAKE 1 TABLET BY MOUTH once daily    furosemide (LASIX) 40 MG tablet Take 1 tablet (40 mg total) by mouth 2 (two) times daily.    krill oil 500 mg Cap Take by mouth.    levothyroxine (SYNTHROID) 300 MCG Tab Take 1 tablet (300 mcg total) by mouth every morning.    lisinopriL 10 MG tablet TAKE 1 BY MOUTH EVERY DAY (Patient taking differently: Take 10 mg by mouth every morning.)    predniSONE (DELTASONE) 50 MG Tab Take 50 mg by mouth 3 (three) times daily as needed. Patient is to take 3 prednisone the day of a CAT scan 13 hours prior, 7 hours prior and and hour prior to the CAT scan    ACCU-CHEK GRACE PLUS METER Misc AS DIRECTED    azelastine (ASTELIN) 137 mcg (0.1 %) nasal spray 2 sprays  "(274 mcg total) by Nasal route 2 (two) times daily. (Patient not taking: Reported on 2024)    blood sugar diagnostic (ACCU-CHEK GRACE PLUS TEST STRP) Strp TEST THREE TIMES A DAY    diphenhydrAMINE (BENADRYL) 50 MG capsule Take 50mg by mouth 1 hour before contrast administration.    fluticasone propionate (FLONASE) 50 mcg/actuation nasal spray 2 sprays (100 mcg total) by Each Nostril route once daily. (Patient not taking: Reported on 2024)    GLUCOSAMINE HCL/CHONDR ROSARIO A NA (OSTEO BI-FLEX ORAL) Take 1 tablet by mouth 2 (two) times daily.     insulin (LANTUS SOLOSTAR U-100 INSULIN) glargine 100 units/mL SubQ pen Inject 44 Units into the skin every evening.    ipratropium (ATROVENT) 21 mcg (0.03 %) nasal spray 2 sprays by Each Nostril route 2 (two) times daily.    lancets (ACCU-CHEK FASTCLIX LANCET DRUM) Misc TEST TWO TIMES A DAY    levocetirizine (XYZAL) 5 MG tablet Take 1 tablet (5 mg total) by mouth every evening.    meloxicam (MOBIC) 15 MG tablet Take 15 mg by mouth.    metFORMIN (GLUCOPHAGE-XR) 500 MG ER 24hr tablet Take 1 tablet (500 mg total) by mouth 2 (two) times daily with meals.    montelukast (SINGULAIR) 10 mg tablet Take 1 tablet (10 mg total) by mouth once daily. (Patient taking differently: Take 10 mg by mouth every evening.)    pen needle, diabetic (BD ULTRA-FINE MINI PEN NEEDLE) 31 gauge x 3/16" Ndle USE AS DIRECTED    propafenone (RHTHYMOL) 150 MG Tab Take 1 tablet (150 mg total) by mouth every 8 (eight) hours.    RABEprazole (ACIPHEX) 20 mg tablet Take 1 tablet (20 mg total) by mouth 2 (two) times daily.    REPATHA SURECLICK 140 mg/mL PnIj SMARTSI Milligram(s) Topical Every 2 Weeks    rivaroxaban (XARELTO) 20 mg Tab Take 1 tablet (20 mg total) by mouth daily with dinner or evening meal.    sildenafiL (VIAGRA) 100 MG tablet Take 1/2 to 1 tablet by mouth one hour prior to intercourse. Max 100mg per day    [DISCONTINUED] acetaminophen (TYLENOL) 325 MG tablet Take 2 tablets (650 mg total) " by mouth every 8 (eight) hours as needed for Pain. (Patient not taking: Reported on 2024)    [DISCONTINUED] blood sugar diagnostic (ACCU-CHEK GRACE PLUS TEST STRP) Strp TEST THREE TIMES A DAY     Family History       Problem Relation (Age of Onset)    Heart disease Mother, Father, Brother          Tobacco Use    Smoking status: Former     Current packs/day: 0.00     Average packs/day: 0.5 packs/day for 4.0 years (2.0 ttl pk-yrs)     Types: Cigarettes     Start date: 1968     Quit date: 1972     Years since quittin.6    Smokeless tobacco: Former     Types: Chew     Quit date: 1976   Substance and Sexual Activity    Alcohol use: Yes     Alcohol/week: 2.0 standard drinks of alcohol     Types: 2 Cans of beer per week    Drug use: No    Sexual activity: Yes     Partners: Female     Review of Systems   Constitutional:  Positive for activity change and fatigue. Negative for diaphoresis and fever.   HENT: Negative.     Eyes: Negative.    Respiratory: Negative.     Cardiovascular: Negative.    Gastrointestinal:  Positive for blood in stool. Negative for abdominal distention and abdominal pain.   Endocrine: Negative.    Genitourinary: Negative.    Musculoskeletal: Negative.    Allergic/Immunologic: Negative.    Neurological: Negative.    Hematological: Negative.    Psychiatric/Behavioral: Negative.       Objective:     Vital Signs (Most Recent):  Temp: 98.9 °F (37.2 °C) (24 1015)  Pulse: 82 (24)  Resp: 18 (24 1448)  BP: (!) 167/83 (24)  SpO2: 100 % (24) Vital Signs (24h Range):  Temp:  [98.9 °F (37.2 °C)] 98.9 °F (37.2 °C)  Pulse:  [76-94] 82  Resp:  [18-20] 18  SpO2:  [95 %-100 %] 100 %  BP: (106-167)/(58-83) 167/83     Weight: 101.5 kg (223 lb 14 oz)  Body mass index is 35.06 kg/m².     Physical Exam  Vitals and nursing note reviewed.   Constitutional:       General: He is not in acute distress.     Appearance: He is well-developed. He is obese. He is  not ill-appearing or diaphoretic.   HENT:      Head: Normocephalic and atraumatic.      Right Ear: External ear normal.      Left Ear: External ear normal.      Nose: Nose normal. No congestion.      Mouth/Throat:      Mouth: Mucous membranes are moist.      Pharynx: Oropharynx is clear.      Comments: Very narrow oropharynx  Eyes:      General: No scleral icterus.     Conjunctiva/sclera: Conjunctivae normal.      Pupils: Pupils are equal, round, and reactive to light.   Cardiovascular:      Rate and Rhythm: Normal rate and regular rhythm.      Pulses: Normal pulses.      Heart sounds: Normal heart sounds.   Pulmonary:      Effort: Pulmonary effort is normal. No respiratory distress.      Breath sounds: Normal breath sounds. No wheezing or rales.   Abdominal:      General: Abdomen is flat. Bowel sounds are normal. There is no distension.      Palpations: Abdomen is soft.      Tenderness: There is no abdominal tenderness. There is no guarding.   Genitourinary:     Rectum: Normal. Guaiac result positive.      Comments: deferred  Musculoskeletal:         General: Normal range of motion.      Cervical back: Normal range of motion and neck supple.   Skin:     General: Skin is warm and dry.      Capillary Refill: Capillary refill takes less than 2 seconds.      Coloration: Skin is not jaundiced.   Neurological:      General: No focal deficit present.      Mental Status: He is alert and oriented to person, place, and time.      Deep Tendon Reflexes: Reflexes are normal and symmetric.   Psychiatric:         Mood and Affect: Mood normal.         Behavior: Behavior normal.         Thought Content: Thought content normal.         Judgment: Judgment normal.          CRANIAL NERVES     CN III, IV, VI   Pupils are equal, round, and reactive to light.     Results for orders placed or performed during the hospital encounter of 01/25/24   CBC auto differential   Result Value Ref Range    WBC 4.79 3.90 - 12.70 K/uL    RBC 2.97 (L)  4.60 - 6.20 M/uL    Hemoglobin 8.4 (L) 14.0 - 18.0 g/dL    Hematocrit 26.1 (L) 40.0 - 54.0 %    MCV 88 82 - 98 fL    MCH 28.3 27.0 - 31.0 pg    MCHC 32.2 32.0 - 36.0 g/dL    RDW 14.1 11.5 - 14.5 %    Platelets 229 150 - 450 K/uL    MPV 9.9 9.2 - 12.9 fL    Immature Granulocytes 0.2 0.0 - 0.5 %    Gran # (ANC) 2.9 1.8 - 7.7 K/uL    Immature Grans (Abs) 0.01 0.00 - 0.04 K/uL    Lymph # 1.3 1.0 - 4.8 K/uL    Mono # 0.4 0.3 - 1.0 K/uL    Eos # 0.1 0.0 - 0.5 K/uL    Baso # 0.03 0.00 - 0.20 K/uL    nRBC 0 0 /100 WBC    Gran % 61.0 38.0 - 73.0 %    Lymph % 26.1 18.0 - 48.0 %    Mono % 9.2 4.0 - 15.0 %    Eosinophil % 2.9 0.0 - 8.0 %    Basophil % 0.6 0.0 - 1.9 %    Differential Method Automated    Comprehensive metabolic panel   Result Value Ref Range    Sodium 138 136 - 145 mmol/L    Potassium 4.0 3.5 - 5.1 mmol/L    Chloride 106 95 - 110 mmol/L    CO2 24 23 - 29 mmol/L    Glucose 136 (H) 70 - 110 mg/dL    BUN 20 8 - 23 mg/dL    Creatinine 1.1 0.5 - 1.4 mg/dL    Calcium 9.3 8.7 - 10.5 mg/dL    Total Protein 7.8 6.0 - 8.4 g/dL    Albumin 3.7 3.5 - 5.2 g/dL    Total Bilirubin 0.3 0.1 - 1.0 mg/dL    Alkaline Phosphatase 80 55 - 135 U/L    AST 22 10 - 40 U/L    ALT 21 10 - 44 U/L    eGFR >60 >60 mL/min/1.73 m^2    Anion Gap 8 8 - 16 mmol/L   Protime-INR   Result Value Ref Range    Prothrombin Time 11.3 9.0 - 12.5 sec    INR 1.1 0.8 - 1.2   Occult blood x 1, stool    Specimen: Stool   Result Value Ref Range    Occult Blood Positive (A) Negative   Type & Screen   Result Value Ref Range    Group & Rh O POS     Indirect Rock NEG     Specimen Outdate 01/28/2024 23:59      *Note: Due to a large number of results and/or encounters for the requested time period, some results have not been displayed. A complete set of results can be found in Results Review.        Significant Labs: All pertinent labs within the past 24 hours have been reviewed.    Imaging Results    None        Significant Imaging: I have reviewed all pertinent  imaging results/findings within the past 24 hours.  Assessment/Plan:     * Gastrointestinal hemorrhage with melena  Pt on Xarelto for afib and recently had femoral stent for mesenteric stent for bowel ischemia- and then ASA/Plavix added. Then started having melena and underwent EGD 1/17- since then has been off Plavix.   H/H mildly low 8.4/26  All three- ASA, Plavix, Xarelto on hold now  Cont Protonix 40 IV bid  Place in Observation- GI consulted  Clear liquid diet            ABLA (acute blood loss anemia)  Patient's anemia is currently uncontrolled. Has not received any PRBCs to date. Etiology likely d/t   Current CBC reviewed-   Lab Results   Component Value Date    HGB 8.4 (L) 01/25/2024    HCT 26.1 (L) 01/25/2024     Monitor serial CBC and transfuse if patient becomes hemodynamically unstable, symptomatic or H/H drops below 7/21.      VTE Risk Mitigation (From admission, onward)           Ordered     Reason for No Pharmacological VTE Prophylaxis  Once        Question:  Reasons:  Answer:  Active Bleeding    01/25/24 1659     IP VTE HIGH RISK PATIENT  Once         01/25/24 1659     Place sequential compression device  Until discontinued         01/25/24 1659                       On 01/25/2024, patient should be placed in hospital observation services under my care.        AdmissionCare    Guideline: Gastrointestinal Bleeding (Upper) - INPT, Inpatient    Based on the indications selected for the patient, the bed status of Admit to Inpatient was determined to be MET    The following indications were selected as present at the time of evaluation of the patient:      - Ongoing active bleeding (eg, decreasing hematocrit)    Presence of significant clinical finding, as indicated by 1 or more of the following:    -      - Dyspnea    AdmissionCare documentation entered by: Radha King    [facility] [employer], 27th edition, Copyright © 2023 [facility] [employer], [employer] All Rights Reserved.  [id number]:58:05-06:00    Felice Peña,  MD  Department of Hospital Medicine  [facility] - Emergency Dept.

## 2024-01-26 NOTE — ED NOTES
"Orthostatic vitals done pt denies any changes. Pt does state he feels " funny, and his head feels light" but only intermittently.   "

## 2024-01-26 NOTE — CONSULTS
[facility] - Emergency Dept.  Gastroenterology  Consult Note    Patient Name: Erendira Pretty  MRN: [id number]  Admission Date: 1/25/2024  Hospital Length of Stay: 0 days  Code Status: Full Code   Attending Provider: Felice Peña MD   Consulting Provider: Phi Cordero PA-C  Primary Care Physician: Bhupinder Callaway MD  Principal Problem:Gastrointestinal hemorrhage with melena    Inpatient consult to Gastroenterology  Consult performed by: Phi Cordero PA-C  Consult ordered by: Felice Peña MD  Reason for consult: GI bleed        Subjective:     HPI:  The patient presented to the ER with black stools for a few days. He is known to our service. His history is significant for SMA stenting 01/05/2024, liver cirrhosis and HCC (tx'd w/ ablation) with small esophageal varices and gastritis. He was admitted 01/16/24 for the same after starting ASA and Plavix following his stent placement. An EGD (01/17/24) showed grade I esophageal varices without bleeding and gastritis. Today he denies nausea, vomiting, abdominal pain, change in appetite or hematochezia. He last took Xarelto two nights ago (01/24/24). ER work up revealed a Hgb of 8.4 which is unchanged overnight. , INR 1.1, BUN 20, creatinine 1.1. He denies taking Pepto or an iron supplement. He is hemodynamically.     EGD (01/17/24): Grade I esophageal varices without bleeding and gastritis.   Colonoscopy (12/2023): colon polyps and ischemic ulcers which led to the recent SMA stenting.       Past Medical History:   Diagnosis Date    Anticoagulant long-term use     Aortic stenosis     Asthma     Benign prostatic hyperplasia with nocturia     Bilateral carotid artery stenosis 07/21/2021    US CAROTID BILATERAL 07/14/2021 IMPRESSION: Atherosclerosis with suspected stenosis greater than 50% at the right proximal ICA visually. Velocities are discordant and appear improved from prior. Atherosclerosis on the left with no evidence of flow-limiting stenosis  greater than 50%.  FINDINGS: Right: Internal Carotid Artery (ICA): Peak systolic velocity 118 cm/sec. 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 09/22/2020    DELONTE on CPAP     Osteoarthritis     Paroxysmal atrial fibrillation     PVD (peripheral vascular disease)     Secondary esophageal varices without bleeding 06/26/2017    Type 2 diabetes mellitus        Past Surgical History:   Procedure Laterality Date    ANGIOGRAM, EXTREMITY, UNILATERAL Left 1/4/2024    Procedure: ANGIOGRAM, EXTREMITY, UNILATERAL- Femoral / SMS Stent, Poss General;  Surgeon: ALEX Alfred III, MD;  Location: [facility] OR [address] [place] [address];  Service: Vascular;  Laterality: Left;  mGy : 2698.78  Gy.cm : 229.88  Contrast : 62ml  Fluro time : 13.8min    CARDIAC CATHETERIZATION      CARDIAC SURGERY      CARPAL TUNNEL RELEASE Left     CATARACT EXTRACTION      CATARACT EXTRACTION W/ INTRAOCULAR LENS  IMPLANT, BILATERAL  2008    CATHETERIZATION OF BOTH LEFT AND RIGHT HEART N/A 11/2/2018    Procedure: CATHETERIZATION, HEART, BOTH LEFT AND RIGHT;  Surgeon: Frank Lazo MD;  Location: [facility] CATH LAB;  Service: Cardiology;  Laterality: N/A;    COLONOSCOPY  8/2013    COLONOSCOPY N/A 5/30/2016    Procedure: COLONOSCOPY;  Surgeon: Anna Tomas MD;  Location: [facility] ENDO;  Service: Endoscopy;  Laterality: N/A;    COLONOSCOPY N/A 7/17/2019    Procedure: COLONOSCOPY;  Surgeon: David Carter III, MD;  Location: [facility] ENDO;  Service: Endoscopy;  Laterality: N/A;    COLONOSCOPY N/A 12/14/2023     Procedure: COLONOSCOPY;  Surgeon: Ama Barrera MD;  Location: [facility] ENDO;  Service: Endoscopy;  Laterality: N/A;    COMPUTED TOMOGRAPHY N/A 9/9/2019    Procedure: CT (COMPUTED TOMOGRAPHY);  Surgeon: [facility] Diagnostic Provider;  Location: [facility] OR;  Service: General;  Laterality: N/A;  Microwave ablation to be done in CT.  Need CRNA and cart    COMPUTED TOMOGRAPHY N/A 1/25/2022    Procedure: Liver Microwave Ablation;  Surgeon: Red Puentes MD;  Location: [facility];  Service: General;  Laterality: N/A;    CORONARY ARTERY BYPASS GRAFT (CABG) N/A 11/5/2018    Procedure: CORONARY ARTERY BYPASS GRAFT (CABG);  Surgeon: Bear De Luna MD;  Location: [facility];  Service: Cardiothoracic;  Laterality: N/A;  TWO VESSEL BYPASS WITH AORTOTOMY AND EXCISION OF ATHEROSCLEROTIC PLAQUE    CORONARY BYPASS GRAFT ANGIOGRAPHY  3/2/2020    Procedure: Bypass graft study;  Surgeon: Cora Cormier MD;  Location: [facility] CATH LAB;  Service: Cardiology;;    ELBOW BURSA SURGERY Right 2010    ENDOSCOPIC HARVEST OF VEIN Left 11/5/2018    Procedure: SURGICAL PROCUREMENT, VEIN, ENDOSCOPIC;  Surgeon: Bear De Luna MD;  Location: [facility];  Service: Cardiothoracic;  Laterality: Left;    ESOPHAGOGASTRODUODENOSCOPY N/A 7/17/2019    Procedure: ESOPHAGOGASTRODUODENOSCOPY (EGD);  Surgeon: David Carter III, MD;  Location: [facility];  Service: Endoscopy;  Laterality: N/A;    ESOPHAGOGASTRODUODENOSCOPY N/A 12/29/2022    Procedure: ESOPHAGOGASTRODUODENOSCOPY (EGD);  Surgeon: Issa Gayle MD;  Location: [facility] ENDO;  Service: Endoscopy;  Laterality: N/A;    ESOPHAGOGASTRODUODENOSCOPY N/A 1/17/2024    Procedure: EGD (ESOPHAGOGASTRODUODENOSCOPY);  Surgeon: Issa Gayle MD;  Location: [facility] ENDO;  Service: Endoscopy;  Laterality: N/A;    ETHMOIDECTOMY Bilateral 3/27/2019    Procedure: ETHMOIDECTOMY;  Surgeon: Alejandro Parkinson MD;  Location: [facility];  Service: ENT;  Laterality: Bilateral;    EYE SURGERY      FOOT SURGERY      FRONTAL SINUS  OBLITERATION Bilateral 3/27/2019    Procedure: SINUSOTOMY, FRONTAL SINUS, OBLITERATIVE;  Surgeon: Alejandro Parkinson MD;  Location: [facility] OR;  Service: ENT;  Laterality: Bilateral;    FUNCTIONAL ENDOSCOPIC SINUS SURGERY (FESS) Bilateral 3/27/2019    Procedure: FESS (FUNCTIONAL ENDOSCOPIC SINUS SURGERY);  Surgeon: Alejandro Parkinson MD;  Location: [facility] OR;  Service: ENT;  Laterality: Bilateral;  Left Keila bullosa resection     KNEE ARTHROSCOPY W/ MENISCAL REPAIR Left 06/2019    dr boykin    KNEE SURGERY Right     LEFT HEART CATHETERIZATION Left 3/2/2020    Procedure: CATHETERIZATION, HEART, LEFT;  Surgeon: Cora Cormier MD;  Location: [facility] CATH LAB;  Service: Cardiology;  Laterality: Left;    LIVER BIOPSY  01/2022    MAXILLARY ANTROSTOMY Bilateral 3/27/2019    Procedure: MAXILLARY ANTROSTOMY;  Surgeon: Alejandro Parkinson MD;  Location: [facility] OR;  Service: ENT;  Laterality: Bilateral;    NASAL SEPTOPLASTY N/A 3/27/2019    Procedure: SEPTOPLASTY, NOSE;  Surgeon: Alejandro Parkinson MD;  Location: [facility] OR;  Service: ENT;  Laterality: N/A;    RECTAL SURGERY  2012    STENT, SUPERIOR MESENTERIC ARTERY Left 1/4/2024    Procedure: STENT, SUPERIOR MESENTERIC ARTERY;  Surgeon: ALEX Alfred III, MD;  Location: [address];  Service: Vascular;  Laterality: Left;    TRANSURETHRAL RESECTION OF PROSTATE (TURP) WITHOUT USE OF LASER N/A 6/19/2018    Procedure: TURP, WITHOUT USING LASER;  Surgeon: Cooper Cordova IV, MD;  Location: [facility] OR;  Service: Urology;  Laterality: N/A;    TRIGGER FINGER RELEASE Left        Review of patient's allergies indicates:   Allergen Reactions    Lipitor [atorvastatin] Other (See Comments)     Muscle aches and pains as well as fatigue.     Niacin preparations Other (See Comments)     Causes broken blood vessels and bruises at 4 times normal dose.    Statins-hmg-coa reductase inhibitors Other (See Comments)     Statins cause intolerable myalgias.    Iodinated contrast media Hives     Tachycardia    Omeprazole Hives and  Itching    Prilosec [omeprazole magnesium] Hives and Itching     Family History       Problem Relation (Age of Onset)    Heart disease Mother, Father, Brother          Tobacco Use    Smoking status: Former     Current packs/day: 0.00     Average packs/day: 0.5 packs/day for 4.0 years (2.0 ttl pk-yrs)     Types: Cigarettes     Start date: 1968     Quit date: 1972     Years since quittin.6    Smokeless tobacco: Former     Types: Chew     Quit date: 1976   Substance and Sexual Activity    Alcohol use: Yes     Alcohol/week: 2.0 standard drinks of alcohol     Types: 2 Cans of beer per week    Drug use: No    Sexual activity: Yes     Partners: Female     Review of Systems   Constitutional:  Negative for fever.   HENT:  Negative for hearing loss.    Eyes:  Negative for visual disturbance.   Respiratory:  Negative for cough and shortness of breath.    Cardiovascular:  Negative for chest pain and palpitations.   Gastrointestinal:         As per HPI.   Genitourinary:  Negative for difficulty urinating, dysuria, frequency and hematuria.   Musculoskeletal:  Negative for arthralgias and back pain.   Skin:  Negative for color change and rash.   Neurological:  Negative for seizures, syncope, weakness, numbness and headaches.   Hematological:  Does not bruise/bleed easily.   Psychiatric/Behavioral:  The patient is not nervous/anxious.      Objective:     Vital Signs (Most Recent):  Temp: 98.9 °F (37.2 °C) (24 1015)  Pulse: 72 (24 0816)  Resp: 18 (24 1448)  BP: 137/80 (24 0816)  SpO2: 98 % (24 0816) Vital Signs (24h Range):  Temp:  [98.9 °F (37.2 °C)] 98.9 °F (37.2 °C)  Pulse:  [69-94] 72  Resp:  [18-20] 18  SpO2:  [95 %-100 %] 98 %  BP: (106-167)/(56-83) 137/80     Weight: 101.5 kg (223 lb 14 oz) (24 1015)  Body mass index is 35.06 kg/m².    No intake or output data in the 24 hours ending 24 0847    Lines/Drains/Airways       Peripheral Intravenous Line  Duration                   Peripheral IV - Single Lumen 01/25/24 1150 20 G Posterior;Right Hand <1 day                     Physical Exam  Constitutional:       General: He is not in acute distress.     Appearance: Normal appearance. He is well-developed.   HENT:      Head: Normocephalic and atraumatic.   Eyes:      Extraocular Movements: Extraocular movements intact.   Cardiovascular:      Rate and Rhythm: Normal rate and regular rhythm.      Heart sounds: Normal heart sounds. No murmur heard.  Pulmonary:      Effort: Pulmonary effort is normal. No respiratory distress.      Breath sounds: Normal breath sounds. No wheezing.   Abdominal:      General: Bowel sounds are normal. There is no distension.      Palpations: Abdomen is soft. There is no mass.      Tenderness: There is no abdominal tenderness.   Genitourinary:     Comments: Rectal exam revealed a skin tag. No hemorrhoids. There is dark brown stool on the skin and in the vault. No internal palpable mass.   Musculoskeletal:      Cervical back: Normal range of motion and neck supple.      Right lower leg: No edema.      Left lower leg: No edema.   Skin:     General: Skin is warm and dry.      Findings: No rash.   Neurological:      Mental Status: He is alert and oriented to person, place, and time.      Cranial Nerves: No cranial nerve deficit.   Psychiatric:         Behavior: Behavior normal.          Significant Labs:  CBC:   Recent Labs   Lab 01/24/24  1040 01/25/24  1143 01/26/24  0728   WBC 5.64 4.79 3.64*   HGB 8.4* 8.4* 8.4*   HCT 26.2* 26.1* 26.5*    229 204     CMP:   Recent Labs   Lab 01/25/24  1143   *   CALCIUM 9.3   ALBUMIN 3.7   PROT 7.8      K 4.0   CO2 24      BUN 20   CREATININE 1.1   ALKPHOS 80   ALT 21   AST 22   BILITOT 0.3     Coagulation:   Recent Labs   Lab 01/25/24  1143   INR 1.1       Significant Imaging:  Imaging results within the past 24 hours have been reviewed.  Assessment/Plan:     GI  * Gastrointestinal hemorrhage with  melena  No evidence of active GI bleeding. Hgb stable overnight. BUN normal. Vitals stable. Stool color dark brown.   No indication for endoscopies at this time.   Recommend Protonix 40 mg daily given recent gastritis.  Avoid NSAIDs like Mobic.   Advance diet as tolerated.   Follow up with Hepatology as outpatient.         Thank you for your consult. I will sign off. Please contact us if you have any additional questions.    Phi Cordero PA-C  Gastroenterology  [facility] - Emergency Dept.

## 2024-01-26 NOTE — NURSING
Discharge summary, health teachings and instructions given to patient with family at bedside--verbalized understanding. IV removed-no complications noted. Tele monitor removed and returned to the monitor room. All questions answered with regards to discharge instructions. Reminded of the importance of follow-up appointments.

## 2024-01-27 NOTE — HOSPITAL COURSE
78 y.o. male patient with a PMHx of CAD, COPD, HTN. Presented to the Emergency Department for blood in stool, onset 2 weeks PTA and worsening anemia. 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 Pt reports having dark stool. Associated sxs include intermittent lightheadedness. Pt is currently on Xarelto. Evaluated by Vascular Surgery on 1/23, planned for imaging studies, noted to have worsening anemia at visit, referred to PCP, repeat labs show Hgb: 9.9 on 1/7, decreased to 8.7 on 1/23, and further decline to 8.4 on 1/24. Reports continued dark stools, with worsening dizziness. In the ED, pt was mildly orthostatic, Bun/Cr normal. Pt started on IV Protonix after consultation by the ER with GI service- Dr. King and placed in Obs under HM.      1/26- pt rested well overnite, states that he still has black stools. However rectal exam today revealed dark brown stool. No evidence of melena or hematochezia. H&H stable at 8.4/25. no abd pain, NVD, dizziness. GI evaluated and cleared him for discharge. Diet was advanced which he tolerated well. He is moving around well. He is scared of taking his ASA and Xarelto, so he was advised to hold them for next couple off days and then restart them one by one next week and to hold them if he notices any hematemesis or melena and to follow with his PCP and the GI clinic for f/u and repeat CBC next week. Pt understands and accepts and feels ready to be discharged. He was seen and examined and deemed stable for discharge home today.

## 2024-01-27 NOTE — DISCHARGE SUMMARY
[facility] Medicine  Discharge Summary      Patient Name: Erendira Pretty  MRN: [id number]  LLOYD: [id number]  Patient Class: OP- Observation  Admission Date: 1/25/2024  Hospital Length of Stay: 0 days  Discharge Date and Time: 1/26/2024  2:40 PM  Attending Physician: No att. providers found   Discharging Provider: Felice Peña MD  Primary Care Provider: Bhupinder Callaway MD    Primary Care Team: Networked reference to record PCT     HPI:   78 y.o. male patient with a PMHx of CAD, COPD, HTN. Presented to the Emergency Department for blood in stool, onset 2 weeks PTA and worsening anemia. 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 Pt reports having dark stool. Symptoms are episodic and moderate in severity. No mitigating or exacerbating factors reported. Associated sxs include intermittent lightheadedness. Patient denies any fever, chills, n/v/d, SOB, CP, weakness, numbness, dizziness, headache, and all other sxs at this time. Pt is currently on Xarelto. Evaluated by Vascular Surgery on 1/23, planned for imaging studies, noted to have worsening anemia at visit, referred to PCP, repeat labs show Hgb: 9.9 on 1/7, decreased to 8.7 on 1/23, and further decline to 8.4 on 1/24. Reports continued dark stools, with worsening dizziness. In the ED, pt was mildly orthostatic, Bun/Cr normal. Pt started on IV Protonix after consultation by the ER with GI service- Dr. King and placed in Obs under HM.     * No surgery found *      Hospital Course:   78 y.o. male patient with a PMHx of CAD, COPD, HTN. Presented to the Emergency Department for blood in stool, onset 2 weeks PTA and worsening anemia. 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 Pt reports having dark stool. Associated sxs include intermittent lightheadedness. Pt is currently on Xarelto. Evaluated by Vascular Surgery on 1/23, planned for  imaging studies, noted to have worsening anemia at visit, referred to PCP, repeat labs show Hgb: 9.9 on 1/7, decreased to 8.7 on 1/23, and further decline to 8.4 on 1/24. Reports continued dark stools, with worsening dizziness. In the ED, pt was mildly orthostatic, Bun/Cr normal. Pt started on IV Protonix after consultation by the ER with GI service- Dr. King and placed in Obs under HM.      1/26- pt rested well overnite, states that he still has black stools. However rectal exam today revealed dark brown stool. No evidence of melena or hematochezia. H&H stable at 8.4/25. no abd pain, NVD, dizziness. GI evaluated and cleared him for discharge. Diet was advanced which he tolerated well. He is moving around well. He is scared of taking his ASA and Xarelto, so he was advised to hold them for next couple off days and then restart them one by one next week and to hold them if he notices any hematemesis or melena and to follow with his PCP and the GI clinic for f/u and repeat CBC next week. Pt understands and accepts and feels ready to be discharged. He was seen and examined and deemed stable for discharge home today.      Goals of Care Treatment Preferences:  Code Status: Full Code    Living Will: Yes              Consults:   Consults (From admission, onward)          Status Ordering Provider     Inpatient consult to Gastroenterology  Once        Provider:  Ama Barrera MD    Completed TEVIN LOBATO            No new Assessment & Plan notes have been filed under this hospital service since the last note was generated.  Service: Hospital Medicine    Final Active Diagnoses:      Problems Resolved During this Admission:    Diagnosis Date Noted Date Resolved POA    PRINCIPAL PROBLEM:  Gastrointestinal hemorrhage with melena [K92.1] 01/16/2024 01/26/2024 Yes    ABLA (acute blood loss anemia) [D62] 01/25/2024 01/26/2024 Yes       Discharged Condition: stable    Disposition: Home or Self Care    Follow Up:    Follow-up Information       Bhupinder Callaway MD. Schedule an appointment as soon as possible for a visit in 3 day(s).    Specialties: Internal Medicine, Pediatrics  Why: Hospital follow up  Contact information:  [id number] [address] [facility]  [place] [address] [phone]                           Patient Instructions:      Diet diabetic     Diet Cardiac     Activity as tolerated       Significant Diagnostic Studies: Labs: BMP:   Recent Labs   Lab 01/25/24  1143   *      K 4.0      CO2 24   BUN 20   CREATININE 1.1   CALCIUM 9.3   , CMP   Recent Labs   Lab 01/25/24  1143      K 4.0      CO2 24   *   BUN 20   CREATININE 1.1   CALCIUM 9.3   PROT 7.8   ALBUMIN 3.7   BILITOT 0.3   ALKPHOS 80   AST 22   ALT 21   ANIONGAP 8   , CBC   Recent Labs   Lab 01/25/24  1143 01/26/24  0728   WBC 4.79 3.64*   HGB 8.4* 8.4*   HCT 26.1* 26.5*    204   , INR   Lab Results   Component Value Date    INR 1.1 01/25/2024    INR 1.1 01/16/2024    INR 1.2 12/05/2023   , and All labs within the past 24 hours have been reviewed  Radiology:   Imaging Results    None          Pending Diagnostic Studies:       None           Medications:   Reconciled Home Medications:      Medication List        CHANGE how you take these medications      amLODIPine 10 MG tablet  Commonly known as: NORVASC  TAKE 1 TABLET BY MOUTH ONCE A DAY (DOSE INCREASE)  What changed: See the new instructions.     empagliflozin 25 mg tablet  Commonly known as: JARDIANCE  Take 1 tablet (25 mg total) by mouth once daily.  What changed: when to take this     lisinopriL 10 MG tablet  TAKE 1 BY MOUTH EVERY DAY  What changed: when to take this     montelukast 10 mg tablet  Commonly known as: SINGULAIR  Take 1 tablet (10 mg total) by mouth once daily.  What changed: when to take this            CONTINUE taking these medications      ACCU-CHEK RGACE PLUS METER Misc  Generic drug: blood-glucose meter  AS DIRECTED     albuterol 90  "mcg/actuation inhaler  Commonly known as: VENTOLIN HFA  Inhale 2 puffs into the lungs every 6 (six) hours as needed for Wheezing or Shortness of Breath. Rescue     blood sugar diagnostic Strp  Commonly known as: ACCU-CHEK GRACE PLUS TEST STRP  TEST THREE TIMES A DAY     budesonide-formoterol 160-4.5 mcg 160-4.5 mcg/actuation Hfaa  Commonly known as: SYMBICORT  INHALE 2 PUFFS INTO THE LUNGS TWO TIMES A DAY.     carvediloL 3.125 MG tablet  Commonly known as: COREG  Take 1 tablet (3.125 mg total) by mouth 2 (two) times daily with meals.     finasteride 5 mg tablet  Commonly known as: PROSCAR  TAKE 1 TABLET BY MOUTH once daily     furosemide 40 MG tablet  Commonly known as: LASIX  Take 1 tablet (40 mg total) by mouth 2 (two) times daily.     insulin glargine 100 units/mL SubQ pen  Commonly known as: LANTUS SOLOSTAR U-100 INSULIN  Inject 44 Units into the skin every evening.     ipratropium 21 mcg (0.03 %) nasal spray  Commonly known as: ATROVENT  2 sprays by Each Nostril route 2 (two) times daily.     lancets Misc  Commonly known as: ACCU-CHEK FASTCLIX LANCET DRUM  TEST TWO TIMES A DAY     levocetirizine 5 MG tablet  Commonly known as: XYZAL  Take 1 tablet (5 mg total) by mouth every evening.     levothyroxine 300 MCG Tab  Commonly known as: SYNTHROID  Take 1 tablet (300 mcg total) by mouth every morning.     metFORMIN 500 MG ER 24hr tablet  Commonly known as: GLUCOPHAGE-XR  Take 1 tablet (500 mg total) by mouth 2 (two) times daily with meals.     OSTEO BI-FLEX ORAL  Take 1 tablet by mouth 2 (two) times daily.     pen needle, diabetic 31 gauge x 3/16" Ndle  Commonly known as: BD ULTRA-FINE MINI PEN NEEDLE  USE AS DIRECTED     propafenone 150 MG Tab  Commonly known as: RHTHYMOL  Take 1 tablet (150 mg total) by mouth every 8 (eight) hours.     RABEprazole 20 mg tablet  Commonly known as: ACIPHEX  Take 1 tablet (20 mg total) by mouth 2 (two) times daily.     REPATHA SURECLICK 140 mg/mL Pnij  Generic drug: " evolocumab  SMARTSI Milligram(s) Topical Every 2 Weeks     sildenafiL 100 MG tablet  Commonly known as: VIAGRA  Take 1/2 to 1 tablet by mouth one hour prior to intercourse. Max 100mg per day            STOP taking these medications      aspirin 81 MG EC tablet  Commonly known as: ECOTRIN     azelastine 137 mcg (0.1 %) nasal spray  Commonly known as: ASTELIN     diphenhydrAMINE 50 MG capsule  Commonly known as: BENADRYL     famotidine 40 MG tablet  Commonly known as: PEPCID     fluticasone propionate 50 mcg/actuation nasal spray  Commonly known as: FLONASE     krill oil 500 mg Cap     meloxicam 15 MG tablet  Commonly known as: MOBIC     predniSONE 50 MG Tab  Commonly known as: DELTASONE     rivaroxaban 20 mg Tab  Commonly known as: XARELTO              Indwelling Lines/Drains at time of discharge:   Lines/Drains/Airways       None                   Time spent on the discharge of patient: 45 minutes         Felice Peña MD  Department of Hospital Medicine  [facility] - Med Surg

## 2024-01-29 ENCOUNTER — HOSPITAL ENCOUNTER (OUTPATIENT)
Dept: RADIOLOGY | Facility: HOSPITAL | Age: 79
Discharge: HOME | End: 2024-01-29
Attending: SURGERY
Payer: MEDICARE

## 2024-01-29 ENCOUNTER — LAB VISIT (OUTPATIENT)
Dept: LAB | Facility: HOSPITAL | Age: 79
End: 2024-01-29
Attending: PEDIATRICS
Payer: MEDICARE

## 2024-01-29 DIAGNOSIS — K92.1 MELENA: ICD-10-CM

## 2024-01-29 DIAGNOSIS — I70.0 AORTOILIAC STENOSIS: ICD-10-CM

## 2024-01-29 LAB
BASOPHILS # BLD AUTO: 0 K/UL (ref 0–0.2)
BASOPHILS NFR BLD: 0 % (ref 0–1.9)
DIFFERENTIAL METHOD BLD: ABNORMAL
EOSINOPHIL # BLD AUTO: 0 K/UL (ref 0–0.5)
EOSINOPHIL NFR BLD: 0 % (ref 0–8)
ERYTHROCYTE [DISTWIDTH] IN BLOOD BY AUTOMATED COUNT: 13.8 % (ref 11.5–14.5)
HCT VFR BLD AUTO: 25.9 % (ref 40–54)
HGB BLD-MCNC: 8.3 G/DL (ref 14–18)
IMM GRANULOCYTES # BLD AUTO: 0.01 K/UL (ref 0–0.04)
IMM GRANULOCYTES NFR BLD AUTO: 0.3 % (ref 0–0.5)
LYMPHOCYTES # BLD AUTO: 0.5 K/UL (ref 1–4.8)
LYMPHOCYTES NFR BLD: 15.1 % (ref 18–48)
MCH RBC QN AUTO: 27.3 PG (ref 27–31)
MCHC RBC AUTO-ENTMCNC: 32 G/DL (ref 32–36)
MCV RBC AUTO: 85 FL (ref 82–98)
MONOCYTES # BLD AUTO: 0.1 K/UL (ref 0.3–1)
MONOCYTES NFR BLD: 1.5 % (ref 4–15)
NEUTROPHILS # BLD AUTO: 2.7 K/UL (ref 1.8–7.7)
NEUTROPHILS NFR BLD: 83.1 % (ref 38–73)
NRBC BLD-RTO: 0 /100 WBC
PLATELET # BLD AUTO: 244 K/UL (ref 150–450)
PMV BLD AUTO: 10.3 FL (ref 9.2–12.9)
RBC # BLD AUTO: 3.04 M/UL (ref 4.6–6.2)
WBC # BLD AUTO: 3.25 K/UL (ref 3.9–12.7)

## 2024-01-29 PROCEDURE — 36415 COLL VENOUS BLD VENIPUNCTURE: CPT | Mod: PN | Performed by: PEDIATRICS

## 2024-01-29 PROCEDURE — 75635 CT ANGIO ABDOMINAL ARTERIES: CPT | Mod: TC,PN

## 2024-01-29 PROCEDURE — 25500020 PHARM REV CODE 255: Mod: PN | Performed by: SURGERY

## 2024-01-29 PROCEDURE — 85025 COMPLETE CBC W/AUTO DIFF WBC: CPT | Performed by: PEDIATRICS

## 2024-01-29 PROCEDURE — 75635 CT ANGIO ABDOMINAL ARTERIES: CPT | Mod: 26,,, | Performed by: RADIOLOGY

## 2024-01-29 RX ORDER — LANCETS
EACH MISCELLANEOUS
Qty: 200 EACH | Refills: 5 | Status: SHIPPED | OUTPATIENT
Start: 2024-01-29

## 2024-01-29 RX ADMIN — IOHEXOL 100 ML: 350 INJECTION, SOLUTION INTRAVENOUS at 10:01

## 2024-01-29 NOTE — TELEPHONE ENCOUNTER
No care due was identified.  [facility] Embedded Care Due Messages. Reference number: [id number].   1/29/2024 10:38:31 AM CST

## 2024-01-30 ENCOUNTER — TELEPHONE (OUTPATIENT)
Dept: INTERNAL MEDICINE | Facility: CLINIC | Age: 79
End: 2024-01-30
Payer: MEDICARE

## 2024-01-30 NOTE — TELEPHONE ENCOUNTER
----- Message from Bhupinder Callaway MD sent at 1/29/2024 12:57 PM CST -----  Your CBC is holding stable. I saw your hospital visit. I want to recheck CBC next week.

## 2024-01-31 ENCOUNTER — OFFICE VISIT (OUTPATIENT)
Dept: INTERNAL MEDICINE | Facility: CLINIC | Age: 79
End: 2024-01-31
Payer: MEDICARE

## 2024-01-31 ENCOUNTER — TELEPHONE (OUTPATIENT)
Dept: VASCULAR SURGERY | Facility: CLINIC | Age: 79
End: 2024-01-31
Payer: MEDICARE

## 2024-01-31 ENCOUNTER — LAB VISIT (OUTPATIENT)
Dept: LAB | Facility: HOSPITAL | Age: 79
End: 2024-01-31
Attending: PEDIATRICS
Payer: MEDICARE

## 2024-01-31 VITALS
HEIGHT: 67 IN | SYSTOLIC BLOOD PRESSURE: 132 MMHG | BODY MASS INDEX: 34.95 KG/M2 | RESPIRATION RATE: 16 BRPM | DIASTOLIC BLOOD PRESSURE: 68 MMHG | TEMPERATURE: 98 F | HEART RATE: 102 BPM | WEIGHT: 222.69 LBS | OXYGEN SATURATION: 99 %

## 2024-01-31 DIAGNOSIS — K92.1 MELENA: ICD-10-CM

## 2024-01-31 DIAGNOSIS — E11.29 TYPE 2 DIABETES MELLITUS WITH MICROALBUMINURIA, WITH LONG-TERM CURRENT USE OF INSULIN: Chronic | ICD-10-CM

## 2024-01-31 DIAGNOSIS — I48.0 PAROXYSMAL ATRIAL FIBRILLATION: Primary | ICD-10-CM

## 2024-01-31 DIAGNOSIS — T45.515D ANTICOAGULANT CAUSING ADVERSE EFFECT IN THERAPEUTIC USE, SUBSEQUENT ENCOUNTER: ICD-10-CM

## 2024-01-31 DIAGNOSIS — R09.81 NASAL CONGESTION: ICD-10-CM

## 2024-01-31 DIAGNOSIS — R80.9 TYPE 2 DIABETES MELLITUS WITH MICROALBUMINURIA, WITH LONG-TERM CURRENT USE OF INSULIN: Chronic | ICD-10-CM

## 2024-01-31 DIAGNOSIS — Z79.4 TYPE 2 DIABETES MELLITUS WITH MICROALBUMINURIA, WITH LONG-TERM CURRENT USE OF INSULIN: Chronic | ICD-10-CM

## 2024-01-31 LAB
BASOPHILS # BLD AUTO: 0.03 K/UL (ref 0–0.2)
BASOPHILS NFR BLD: 0.5 % (ref 0–1.9)
CTP QC/QA: YES
CTP QC/QA: YES
DIFFERENTIAL METHOD BLD: ABNORMAL
EOSINOPHIL # BLD AUTO: 0.1 K/UL (ref 0–0.5)
EOSINOPHIL NFR BLD: 1.4 % (ref 0–8)
ERYTHROCYTE [DISTWIDTH] IN BLOOD BY AUTOMATED COUNT: 14.4 % (ref 11.5–14.5)
HCT VFR BLD AUTO: 25.1 % (ref 40–54)
HGB BLD-MCNC: 7.8 G/DL (ref 14–18)
IMM GRANULOCYTES # BLD AUTO: 0.02 K/UL (ref 0–0.04)
IMM GRANULOCYTES NFR BLD AUTO: 0.3 % (ref 0–0.5)
LYMPHOCYTES # BLD AUTO: 1.2 K/UL (ref 1–4.8)
LYMPHOCYTES NFR BLD: 18.9 % (ref 18–48)
MCH RBC QN AUTO: 26.6 PG (ref 27–31)
MCHC RBC AUTO-ENTMCNC: 31.1 G/DL (ref 32–36)
MCV RBC AUTO: 86 FL (ref 82–98)
MONOCYTES # BLD AUTO: 0.8 K/UL (ref 0.3–1)
MONOCYTES NFR BLD: 12 % (ref 4–15)
NEUTROPHILS # BLD AUTO: 4.2 K/UL (ref 1.8–7.7)
NEUTROPHILS NFR BLD: 66.9 % (ref 38–73)
NRBC BLD-RTO: 0 /100 WBC
PLATELET # BLD AUTO: 255 K/UL (ref 150–450)
PMV BLD AUTO: 10.5 FL (ref 9.2–12.9)
POC MOLECULAR INFLUENZA A AGN: NEGATIVE
POC MOLECULAR INFLUENZA B AGN: NEGATIVE
RBC # BLD AUTO: 2.93 M/UL (ref 4.6–6.2)
SARS-COV-2 RDRP RESP QL NAA+PROBE: NEGATIVE
WBC # BLD AUTO: 6.25 K/UL (ref 3.9–12.7)

## 2024-01-31 PROCEDURE — 1125F AMNT PAIN NOTED PAIN PRSNT: CPT | Mod: CPTII,S$GLB,, | Performed by: PEDIATRICS

## 2024-01-31 PROCEDURE — 87635 SARS-COV-2 COVID-19 AMP PRB: CPT | Mod: QW,S$GLB,, | Performed by: PEDIATRICS

## 2024-01-31 PROCEDURE — 1111F DSCHRG MED/CURRENT MED MERGE: CPT | Mod: CPTII,S$GLB,, | Performed by: PEDIATRICS

## 2024-01-31 PROCEDURE — 99496 TRANSJ CARE MGMT HIGH F2F 7D: CPT | Mod: S$GLB,,, | Performed by: PEDIATRICS

## 2024-01-31 PROCEDURE — 87502 INFLUENZA DNA AMP PROBE: CPT | Mod: QW,S$GLB,, | Performed by: PEDIATRICS

## 2024-01-31 PROCEDURE — 3075F SYST BP GE 130 - 139MM HG: CPT | Mod: CPTII,S$GLB,, | Performed by: PEDIATRICS

## 2024-01-31 PROCEDURE — 99999 PR PBB SHADOW E&M-EST. PATIENT-LVL V: CPT | Mod: PBBFAC,,, | Performed by: PEDIATRICS

## 2024-01-31 PROCEDURE — 3288F FALL RISK ASSESSMENT DOCD: CPT | Mod: CPTII,S$GLB,, | Performed by: PEDIATRICS

## 2024-01-31 PROCEDURE — 85025 COMPLETE CBC W/AUTO DIFF WBC: CPT | Performed by: PEDIATRICS

## 2024-01-31 PROCEDURE — 36415 COLL VENOUS BLD VENIPUNCTURE: CPT | Performed by: PEDIATRICS

## 2024-01-31 PROCEDURE — 3072F LOW RISK FOR RETINOPATHY: CPT | Mod: CPTII,S$GLB,, | Performed by: PEDIATRICS

## 2024-01-31 PROCEDURE — 1157F ADVNC CARE PLAN IN RCRD: CPT | Mod: CPTII,S$GLB,, | Performed by: PEDIATRICS

## 2024-01-31 PROCEDURE — 3078F DIAST BP <80 MM HG: CPT | Mod: CPTII,S$GLB,, | Performed by: PEDIATRICS

## 2024-01-31 PROCEDURE — 1101F PT FALLS ASSESS-DOCD LE1/YR: CPT | Mod: CPTII,S$GLB,, | Performed by: PEDIATRICS

## 2024-01-31 PROCEDURE — 1160F RVW MEDS BY RX/DR IN RCRD: CPT | Mod: CPTII,S$GLB,, | Performed by: PEDIATRICS

## 2024-01-31 PROCEDURE — 1159F MED LIST DOCD IN RCRD: CPT | Mod: CPTII,S$GLB,, | Performed by: PEDIATRICS

## 2024-01-31 NOTE — TELEPHONE ENCOUNTER
"Per Dr. Alfred's request nurse contacted pt with results of recent CTA. Explained that SMA stent "looks great" and that blood flow to his legs is adequate. Notified pt that Dr. Alfred feels pt's reports of leg weariness is not vascular in origin, but due to pt's anemia, therefore he feels FU appt and JACKY on 2/6/24 can be cancelled. Pt verbalized understanding and states Dr. Callaway felt that leg weariness was also caused by anemia and that he will be starting iron supplement tablets tomorrow. Appts cancelled and pt states he will call with any questions or concerns.  "

## 2024-01-31 NOTE — PROGRESS NOTES
Subjective     Patient ID: Erendira Pretty is a 78 y.o. male.    Chief Complaint: No chief complaint on file.    Erendira Pretty is a 78 year old male here for a hospital follow up appointment. Pt was hospitalized on 01/25/24 again for melena. Pt was anemic on labs done during his hospital stay and again on labs from 01/29/24. Pt states he has not had any more blood in his stool since being discharged, but he is still experiencing fatigue and weakness. Pt is back on eliquis. Pt also notes cough, nasal congestion, sinus pressure, and rhinorrhea in the past few days. Pt had an SMA stent placed by Dr. Gutiérrez on 01/04/24.    Hospital Summary:    78 y.o. male patient with a PMHx of CAD, COPD, HTN. Presented to the Emergency Department for blood in stool, onset 2 weeks PTA and worsening anemia. 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 Pt reports having dark stool. Symptoms are episodic and moderate in severity. No mitigating or exacerbating factors reported. Associated sxs include intermittent lightheadedness. Patient denies any fever, chills, n/v/d, SOB, CP, weakness, numbness, dizziness, headache, and all other sxs at this time. Pt is currently on Xarelto. Evaluated by Vascular Surgery on 1/23, planned for imaging studies, noted to have worsening anemia at visit, referred to PCP, repeat labs show Hgb: 9.9 on 1/7, decreased to 8.7 on 1/23, and further decline to 8.4 on 1/24. Reports continued dark stools, with worsening dizziness. In the ED, pt was mildly orthostatic, Bun/Cr normal. Pt started on IV Protonix after consultation by the ER with GI service- Dr. King and placed in Obs under HM.     Pt has restarted eliquis, no further melena.      Review of Systems   Constitutional:  Positive for fatigue. Negative for chills and fever.   HENT:  Positive for nasal congestion, rhinorrhea and sinus pressure/congestion.    Respiratory:  Positive for cough. Negative for  shortness of breath.    Cardiovascular:  Negative for chest pain.   Gastrointestinal:  Negative for abdominal pain, blood in stool, change in bowel habit, constipation, diarrhea and nausea.   Endocrine: Negative for cold intolerance, heat intolerance, polydipsia, polyphagia and polyuria.   Genitourinary:  Negative for dysuria.   Neurological:  Positive for weakness.          Objective     Physical Exam  Constitutional:       General: He is not in acute distress.     Appearance: Normal appearance. He is obese. He is not ill-appearing, toxic-appearing or diaphoretic.   HENT:      Head: Atraumatic.   Cardiovascular:      Rate and Rhythm: Normal rate and regular rhythm.      Pulses: Normal pulses.      Heart sounds: Normal heart sounds. No murmur heard.  Pulmonary:      Effort: Pulmonary effort is normal. No respiratory distress.      Breath sounds: Normal breath sounds. No stridor. No wheezing, rhonchi or rales.   Chest:      Chest wall: No tenderness.   Abdominal:      General: Abdomen is flat. Bowel sounds are normal. There is no distension.      Palpations: Abdomen is soft. There is no mass.      Tenderness: There is no abdominal tenderness. There is no guarding.   Neurological:      General: No focal deficit present.      Mental Status: He is alert and oriented to person, place, and time. Mental status is at baseline.      Cranial Nerves: No cranial nerve deficit.      Sensory: No sensory deficit.      Motor: No weakness.      Gait: Gait normal.   Psychiatric:         Mood and Affect: Mood normal.         Behavior: Behavior normal.            Assessment and Plan     1. Paroxysmal atrial fibrillation  -     Ambulatory referral/consult to Cardiology; Future; Expected date: 02/07/2024    2. Melena  -     CBC Auto Differential; Future; Expected date: 01/31/2024  -     Capsule Video Endoscopy; Future  -     CBC Auto Differential; Future; Expected date: 01/31/2024  -     Iron and TIBC; Future; Expected date:  01/31/2024  -     Ferritin; Future; Expected date: 01/31/2024    3. Anticoagulant causing adverse effect in therapeutic use, subsequent encounter  -     Ambulatory referral/consult to Cardiology; Future; Expected date: 02/07/2024    4. Nasal congestion  -     POCT COVID-19 Rapid Screening  -     POCT Influenza A/B Molecular    5. Type 2 diabetes mellitus with microalbuminuria, with long-term current use of insulin  -     ACCU-CHEK GRACE PLUS METER Misc; AS DIRECTED  Dispense: 1 each; Refill: 0        COVID, flu testing in office was normal, suspect symptomatology due to anemia and weakness. Daily iron supplement advised. Dark stools resulting from iron discussed with pt. Referral to cardiology placed. I would like them to review need for eliquis, get video capsule endoscopy to look for other sources of GI bleed. CT runoff reviewed with pt at his request. 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 While he may have some claudication on the right from the 70% blockage, that would not explain sxs on right, so I think anemia is contributing to these sxs. If CBC and iron studies do not  at next visit with me, then referral to hematology for IV iron.     Transitional Care Note    Family and/or Caretaker present at visit?  No.  Diagnostic tests reviewed/disposition: I have reviewed all completed as well as pending diagnostic tests at the time of discharge.  Disease/illness education: given  Home health/community services discussion/referrals: Patient does not have home health established from hospital visit.  They do not need home health.  If needed, we will set up home health for the patient.   Establishment or re-establishment of referral orders for community resources: No other necessary community resources.   Discussion with other health care providers:  subspecialty care in place .               No  follow-ups on file.

## 2024-02-01 ENCOUNTER — PATIENT MESSAGE (OUTPATIENT)
Dept: ENDOSCOPY | Facility: HOSPITAL | Age: 79
End: 2024-02-01
Payer: MEDICARE

## 2024-02-01 DIAGNOSIS — D50.0 IRON DEFICIENCY ANEMIA DUE TO CHRONIC BLOOD LOSS: Primary | ICD-10-CM

## 2024-02-02 ENCOUNTER — HOSPITAL ENCOUNTER (OUTPATIENT)
Facility: HOSPITAL | Age: 79
Discharge: HOME | End: 2024-02-02
Attending: INTERNAL MEDICINE | Admitting: INTERNAL MEDICINE
Payer: MEDICARE

## 2024-02-02 ENCOUNTER — TELEPHONE (OUTPATIENT)
Dept: INTERNAL MEDICINE | Facility: CLINIC | Age: 79
End: 2024-02-02
Payer: MEDICARE

## 2024-02-02 PROCEDURE — 27201489 HC PILLCAM CAPSULE

## 2024-02-02 PROCEDURE — 91110 GI TRC IMG INTRAL ESOPH-ILE: CPT | Mod: TC | Performed by: INTERNAL MEDICINE

## 2024-02-02 NOTE — TELEPHONE ENCOUNTER
----- Message from Glenda Mar sent at 2/2/2024  3:23 PM CST -----  Contact: Erendira  Patient is calling to speak with someone regarding prescription. Patient request to be informed if provider instructs patient to stop taking Xarelto prescription. Please give patient a call back at [phone] to notify.  Thank you,  GH

## 2024-02-02 NOTE — TELEPHONE ENCOUNTER
Followed up with Erendira, patient has been notified of this information and all questions answered. Per patient's provider:  Hold off until cleared from GI   . Patient verbalized understanding.

## 2024-02-05 ENCOUNTER — TELEPHONE (OUTPATIENT)
Dept: HEPATOLOGY | Facility: CLINIC | Age: 79
End: 2024-02-05
Payer: MEDICARE

## 2024-02-05 PROCEDURE — 91110 GI TRC IMG INTRAL ESOPH-ILE: CPT | Mod: 26,,, | Performed by: INTERNAL MEDICINE

## 2024-02-05 NOTE — TELEPHONE ENCOUNTER
----- Message from Latoya Saab sent at 2/5/2024 10:26 AM CST -----  Contact: self  Patient is requesting a call back regarding MRI. Please call back @ [phone] (bslb)

## 2024-02-05 NOTE — TELEPHONE ENCOUNTER
I am not aware of the reason why he can not have the MRI done even though he had an endoscopy done recently.  I will send a message to the doctor who performed the endoscopy to see if there is anything to clarify.

## 2024-02-05 NOTE — TELEPHONE ENCOUNTER
Contacted the patient to inform him of the plan, he states that the nurse Sameer that performed a barium swallow informed him of that.

## 2024-02-05 NOTE — TELEPHONE ENCOUNTER
Returned patient's call, he stated that he's currently scheduled to have an MRI on 2/28/24 but just had a scope done a few days ago, he was advised that he should wait until at least 30 days before doing the MRI. Is the current date okay?

## 2024-02-06 ENCOUNTER — LAB VISIT (OUTPATIENT)
Dept: LAB | Facility: HOSPITAL | Age: 79
End: 2024-02-06
Attending: PEDIATRICS
Payer: MEDICARE

## 2024-02-06 ENCOUNTER — PATIENT MESSAGE (OUTPATIENT)
Dept: GASTROENTEROLOGY | Facility: CLINIC | Age: 79
End: 2024-02-06
Payer: MEDICARE

## 2024-02-06 DIAGNOSIS — K92.1 MELENA: ICD-10-CM

## 2024-02-06 LAB
BASOPHILS # BLD AUTO: 0.03 K/UL (ref 0–0.2)
BASOPHILS NFR BLD: 0.6 % (ref 0–1.9)
DIFFERENTIAL METHOD BLD: ABNORMAL
EOSINOPHIL # BLD AUTO: 0.2 K/UL (ref 0–0.5)
EOSINOPHIL NFR BLD: 3.6 % (ref 0–8)
ERYTHROCYTE [DISTWIDTH] IN BLOOD BY AUTOMATED COUNT: 15.7 % (ref 11.5–14.5)
HCT VFR BLD AUTO: 24.5 % (ref 40–54)
HGB BLD-MCNC: 7.2 G/DL (ref 14–18)
IMM GRANULOCYTES # BLD AUTO: 0.02 K/UL (ref 0–0.04)
IMM GRANULOCYTES NFR BLD AUTO: 0.4 % (ref 0–0.5)
LYMPHOCYTES # BLD AUTO: 1.3 K/UL (ref 1–4.8)
LYMPHOCYTES NFR BLD: 26.6 % (ref 18–48)
MCH RBC QN AUTO: 25.2 PG (ref 27–31)
MCHC RBC AUTO-ENTMCNC: 29.4 G/DL (ref 32–36)
MCV RBC AUTO: 86 FL (ref 82–98)
MONOCYTES # BLD AUTO: 0.6 K/UL (ref 0.3–1)
MONOCYTES NFR BLD: 10.9 % (ref 4–15)
NEUTROPHILS # BLD AUTO: 2.9 K/UL (ref 1.8–7.7)
NEUTROPHILS NFR BLD: 57.9 % (ref 38–73)
NRBC BLD-RTO: 0 /100 WBC
PLATELET # BLD AUTO: 200 K/UL (ref 150–450)
PMV BLD AUTO: 10.7 FL (ref 9.2–12.9)
RBC # BLD AUTO: 2.86 M/UL (ref 4.6–6.2)
WBC # BLD AUTO: 5.03 K/UL (ref 3.9–12.7)

## 2024-02-06 PROCEDURE — 36415 COLL VENOUS BLD VENIPUNCTURE: CPT | Mod: PO | Performed by: PEDIATRICS

## 2024-02-06 PROCEDURE — 85025 COMPLETE CBC W/AUTO DIFF WBC: CPT | Performed by: PEDIATRICS

## 2024-02-06 NOTE — [employer] PATIENT INSTRUCTIONS
Discharge Summary/Instructions after an Endoscopic Procedure  Patient Name: Erendira Pretty  Patient MRN: [id number]  Patient YOB: 1945 Friday, February 2, 2024 Charlotte King MD  Dear patient,  As a result of recent federal legislation (The Federal Cures Act), you may   receive lab or pathology results from your procedure in your [facility]   account before your physician is able to contact you. Your physician or   their representative will relay the results to you with their   recommendations at their soonest availability.  Thank you,  RESTRICTIONS:  During your procedure today, you received medications for sedation.  These   medications may affect your judgment, balance and coordination.  Therefore,   for 24 hours, you have the following restrictions:   - DO NOT drive a car, operate machinery, make legal/financial decisions,   sign important papers or drink alcohol.    ACTIVITY:  Today: no heavy lifting, straining or running due to procedural   sedation/anesthesia.  The following day: return to full activity including work.  DIET:  Eat and drink normally unless instructed otherwise.     TREATMENT FOR COMMON SIDE EFFECTS:  - Mild abdominal pain, nausea, belching, bloating or excessive gas:  rest,   eat lightly and use a heating pad.  - Sore Throat: treat with throat lozenges and/or gargle with warm salt   water.  - Because air was used during the procedure, expelling large amounts of air   from your rectum or belching is normal.  - If a bowel prep was taken, you may not have a bowel movement for 1-3 days.    This is normal.  SYMPTOMS TO WATCH FOR AND REPORT TO YOUR PHYSICIAN:  1. Abdominal pain or bloating, other than gas cramps.  2. Chest pain.  3. Back pain.  4. Signs of infection such as: chills or fever occurring within 24 hours   after the procedure.  5. Rectal bleeding, which would show as bright red, maroon, or black stools.   (A tablespoon of blood from the rectum is not serious, especially if    hemorrhoids are present.)  6. Vomiting.  7. Weakness or dizziness.  GO DIRECTLY TO THE NEAREST EMERGENCY ROOM IF YOU HAVE ANY OF THE FOLLOWING:      Difficulty breathing              Chills and/or fever over 101 F   Persistent vomiting and/or vomiting blood   Severe abdominal pain   Severe chest pain   Black, tarry stools   Bleeding- more than one tablespoon   Any other symptom or condition that you feel may need urgent attention  Your doctor recommends these additional instructions:  If any biopsies were taken, your doctors clinic will contact you in 1 to 2   weeks with any results.  - Continue present medications.  For questions, problems or results please call your physician Charlotte King MD at Work:  [phone]  If you have any questions about the above instructions, call the GI   department at [phone] or call the endoscopy unit at [phone]   from 7am until 3 pm.  [facility] [place], EMERGENCY ROOM PHONE NUMBER:   [phone]  IF A COMPLICATION OR EMERGENCY SITUATION ARISES AND YOU ARE UNABLE TO REACH   YOUR PHYSICIAN - GO DIRECTLY TO THE EMERGENCY ROOM.  I have read or have had read to me these discharge instructions for my   procedure and have received a written copy.  I understand these   instructions and will follow-up with my physician if I have any questions.     __________________________________       _____________________________________  Nurse Signature                                          Patient/Designated   Responsible Party Signature  MD Charlotte Lancaster MD  2/5/2024 6:45:08 PM  [employer]

## 2024-02-07 ENCOUNTER — HOSPITAL ENCOUNTER (EMERGENCY)
Facility: HOSPITAL | Age: 79
Discharge: HOME | End: 2024-02-07
Attending: EMERGENCY MEDICINE
Payer: MEDICARE

## 2024-02-07 ENCOUNTER — OFFICE VISIT (OUTPATIENT)
Dept: CARDIOLOGY | Facility: CLINIC | Age: 79
End: 2024-02-07
Payer: MEDICARE

## 2024-02-07 ENCOUNTER — TELEPHONE (OUTPATIENT)
Dept: INTERNAL MEDICINE | Facility: CLINIC | Age: 79
End: 2024-02-07
Payer: MEDICARE

## 2024-02-07 VITALS
SYSTOLIC BLOOD PRESSURE: 132 MMHG | WEIGHT: 224.63 LBS | HEART RATE: 81 BPM | DIASTOLIC BLOOD PRESSURE: 78 MMHG | BODY MASS INDEX: 35.18 KG/M2 | OXYGEN SATURATION: 100 %

## 2024-02-07 VITALS
HEART RATE: 16 BPM | OXYGEN SATURATION: 100 % | TEMPERATURE: 98 F | SYSTOLIC BLOOD PRESSURE: 160 MMHG | DIASTOLIC BLOOD PRESSURE: 77 MMHG | RESPIRATION RATE: 16 BRPM | BODY MASS INDEX: 35.06 KG/M2 | WEIGHT: 223.88 LBS

## 2024-02-07 DIAGNOSIS — Z66 DNR (DO NOT RESUSCITATE): ICD-10-CM

## 2024-02-07 DIAGNOSIS — G47.33 OSA ON CPAP: Chronic | ICD-10-CM

## 2024-02-07 DIAGNOSIS — I49.3 PVC (PREMATURE VENTRICULAR CONTRACTION): ICD-10-CM

## 2024-02-07 DIAGNOSIS — Z95.1 S/P CABG X 2: ICD-10-CM

## 2024-02-07 DIAGNOSIS — I20.9 ANGINA PECTORIS: ICD-10-CM

## 2024-02-07 DIAGNOSIS — E78.2 MIXED HYPERLIPIDEMIA: ICD-10-CM

## 2024-02-07 DIAGNOSIS — D64.9 ANEMIA, UNSPECIFIED TYPE: Primary | ICD-10-CM

## 2024-02-07 DIAGNOSIS — T45.515D ANTICOAGULANT CAUSING ADVERSE EFFECT IN THERAPEUTIC USE, SUBSEQUENT ENCOUNTER: ICD-10-CM

## 2024-02-07 DIAGNOSIS — I65.23 BILATERAL CAROTID ARTERY STENOSIS: Chronic | ICD-10-CM

## 2024-02-07 DIAGNOSIS — D64.9 SYMPTOMATIC ANEMIA: Primary | ICD-10-CM

## 2024-02-07 DIAGNOSIS — I48.0 PAROXYSMAL ATRIAL FIBRILLATION: ICD-10-CM

## 2024-02-07 DIAGNOSIS — I70.0 AORTIC ATHEROSCLEROSIS: Primary | ICD-10-CM

## 2024-02-07 DIAGNOSIS — E66.01 CLASS 2 SEVERE OBESITY DUE TO EXCESS CALORIES WITH SERIOUS COMORBIDITY AND BODY MASS INDEX (BMI) OF 37.0 TO 37.9 IN ADULT: ICD-10-CM

## 2024-02-07 DIAGNOSIS — I10 ESSENTIAL HYPERTENSION: Chronic | ICD-10-CM

## 2024-02-07 DIAGNOSIS — I73.9 PVD (PERIPHERAL VASCULAR DISEASE): ICD-10-CM

## 2024-02-07 DIAGNOSIS — I25.10 CORONARY ARTERY DISEASE INVOLVING NATIVE CORONARY ARTERY OF NATIVE HEART WITHOUT ANGINA PECTORIS: Chronic | ICD-10-CM

## 2024-02-07 PROBLEM — E66.812 CLASS 2 SEVERE OBESITY DUE TO EXCESS CALORIES WITH SERIOUS COMORBIDITY AND BODY MASS INDEX (BMI) OF 37.0 TO 37.9 IN ADULT: Status: ACTIVE | Noted: 2024-02-07

## 2024-02-07 PROBLEM — T45.515A ANTICOAGULANTS CAUSING ADVERSE EFFECT IN THERAPEUTIC USE: Status: ACTIVE | Noted: 2024-02-07

## 2024-02-07 LAB
ABO + RH BLD: NORMAL
ALBUMIN SERPL BCP-MCNC: 3.7 G/DL (ref 3.5–5.2)
ALP SERPL-CCNC: 84 U/L (ref 55–135)
ALT SERPL W/O P-5'-P-CCNC: 19 U/L (ref 10–44)
ANION GAP SERPL CALC-SCNC: 11 MMOL/L (ref 8–16)
AST SERPL-CCNC: 22 U/L (ref 10–40)
BASOPHILS # BLD AUTO: 0.05 K/UL (ref 0–0.2)
BASOPHILS NFR BLD: 0.9 % (ref 0–1.9)
BILIRUB SERPL-MCNC: 0.4 MG/DL (ref 0.1–1)
BLD GP AB SCN CELLS X3 SERPL QL: NORMAL
BLD PROD TYP BPU: NORMAL
BLOOD UNIT EXPIRATION DATE: NORMAL
BLOOD UNIT TYPE CODE: 5100
BLOOD UNIT TYPE: NORMAL
BUN SERPL-MCNC: 14 MG/DL (ref 8–23)
CALCIUM SERPL-MCNC: 8.8 MG/DL (ref 8.7–10.5)
CHLORIDE SERPL-SCNC: 106 MMOL/L (ref 95–110)
CO2 SERPL-SCNC: 24 MMOL/L (ref 23–29)
CODING SYSTEM: NORMAL
CREAT SERPL-MCNC: 1.1 MG/DL (ref 0.5–1.4)
CROSSMATCH INTERPRETATION: NORMAL
DIFFERENTIAL METHOD BLD: ABNORMAL
DISPENSE STATUS: NORMAL
EOSINOPHIL # BLD AUTO: 0.2 K/UL (ref 0–0.5)
EOSINOPHIL NFR BLD: 3.2 % (ref 0–8)
ERYTHROCYTE [DISTWIDTH] IN BLOOD BY AUTOMATED COUNT: 15.6 % (ref 11.5–14.5)
EST. GFR  (NO RACE VARIABLE): >60 ML/MIN/1.73 M^2
GLUCOSE SERPL-MCNC: 114 MG/DL (ref 70–110)
HCT VFR BLD AUTO: 25.9 % (ref 40–54)
HGB BLD-MCNC: 7.8 G/DL (ref 14–18)
IMM GRANULOCYTES # BLD AUTO: 0.01 K/UL (ref 0–0.04)
IMM GRANULOCYTES NFR BLD AUTO: 0.2 % (ref 0–0.5)
LYMPHOCYTES # BLD AUTO: 1.6 K/UL (ref 1–4.8)
LYMPHOCYTES NFR BLD: 27 % (ref 18–48)
MCH RBC QN AUTO: 24.5 PG (ref 27–31)
MCHC RBC AUTO-ENTMCNC: 30.1 G/DL (ref 32–36)
MCV RBC AUTO: 81 FL (ref 82–98)
MONOCYTES # BLD AUTO: 0.7 K/UL (ref 0.3–1)
MONOCYTES NFR BLD: 11.8 % (ref 4–15)
NEUTROPHILS # BLD AUTO: 3.3 K/UL (ref 1.8–7.7)
NEUTROPHILS NFR BLD: 56.9 % (ref 38–73)
NRBC BLD-RTO: 0 /100 WBC
NUM UNITS TRANS PACKED RBC: NORMAL
OB PNL STL: NEGATIVE
OHS QRS DURATION: 92 MS
OHS QTC CALCULATION: 434 MS
PLATELET # BLD AUTO: 260 K/UL (ref 150–450)
PMV BLD AUTO: 10.3 FL (ref 9.2–12.9)
POTASSIUM SERPL-SCNC: 4.2 MMOL/L (ref 3.5–5.1)
PROT SERPL-MCNC: 7.2 G/DL (ref 6–8.4)
RBC # BLD AUTO: 3.19 M/UL (ref 4.6–6.2)
SODIUM SERPL-SCNC: 141 MMOL/L (ref 136–145)
SPECIMEN OUTDATE: NORMAL
WBC # BLD AUTO: 5.86 K/UL (ref 3.9–12.7)

## 2024-02-07 PROCEDURE — 86920 COMPATIBILITY TEST SPIN: CPT | Performed by: EMERGENCY MEDICINE

## 2024-02-07 PROCEDURE — 85025 COMPLETE CBC W/AUTO DIFF WBC: CPT | Performed by: NURSE PRACTITIONER

## 2024-02-07 PROCEDURE — P9016 RBC LEUKOCYTES REDUCED: HCPCS | Performed by: EMERGENCY MEDICINE

## 2024-02-07 PROCEDURE — 3078F DIAST BP <80 MM HG: CPT | Mod: CPTII,S$GLB,, | Performed by: INTERNAL MEDICINE

## 2024-02-07 PROCEDURE — 1101F PT FALLS ASSESS-DOCD LE1/YR: CPT | Mod: CPTII,S$GLB,, | Performed by: INTERNAL MEDICINE

## 2024-02-07 PROCEDURE — 99999 PR PBB SHADOW E&M-EST. PATIENT-LVL V: CPT | Mod: PBBFAC,,, | Performed by: INTERNAL MEDICINE

## 2024-02-07 PROCEDURE — 99214 OFFICE O/P EST MOD 30 MIN: CPT | Mod: S$GLB,,, | Performed by: INTERNAL MEDICINE

## 2024-02-07 PROCEDURE — 1157F ADVNC CARE PLAN IN RCRD: CPT | Mod: CPTII,S$GLB,, | Performed by: INTERNAL MEDICINE

## 2024-02-07 PROCEDURE — 36430 TRANSFUSION BLD/BLD COMPNT: CPT

## 2024-02-07 PROCEDURE — 3075F SYST BP GE 130 - 139MM HG: CPT | Mod: CPTII,S$GLB,, | Performed by: INTERNAL MEDICINE

## 2024-02-07 PROCEDURE — 80053 COMPREHEN METABOLIC PANEL: CPT | Performed by: NURSE PRACTITIONER

## 2024-02-07 PROCEDURE — 99285 EMERGENCY DEPT VISIT HI MDM: CPT | Mod: 25

## 2024-02-07 PROCEDURE — 3288F FALL RISK ASSESSMENT DOCD: CPT | Mod: CPTII,S$GLB,, | Performed by: INTERNAL MEDICINE

## 2024-02-07 PROCEDURE — 82272 OCCULT BLD FECES 1-3 TESTS: CPT | Performed by: EMERGENCY MEDICINE

## 2024-02-07 PROCEDURE — 3072F LOW RISK FOR RETINOPATHY: CPT | Mod: CPTII,S$GLB,, | Performed by: INTERNAL MEDICINE

## 2024-02-07 PROCEDURE — 1126F AMNT PAIN NOTED NONE PRSNT: CPT | Mod: CPTII,S$GLB,, | Performed by: INTERNAL MEDICINE

## 2024-02-07 PROCEDURE — 86901 BLOOD TYPING SEROLOGIC RH(D): CPT | Performed by: NURSE PRACTITIONER

## 2024-02-07 RX ORDER — HYDROCODONE BITARTRATE AND ACETAMINOPHEN 500; 5 MG/1; MG/1
TABLET ORAL
Status: DISCONTINUED | OUTPATIENT
Start: 2024-02-07 | End: 2024-02-07 | Stop reason: HOSPADM

## 2024-02-07 NOTE — ED PROVIDER NOTES
SCRIBE #1 NOTE: I, Deedee Greenberg, am scribing for, and in the presence of, Daryl Arevalo Jr., MD. I have scribed the entire note.       History     Chief Complaint   Patient presents with    Abnormal Lab     Reports abnormal blood level after blood in stool.      Review of patient's allergies indicates:   Allergen Reactions    Lipitor [atorvastatin] Other (See Comments)     Muscle aches and pains as well as fatigue.     Niacin preparations Other (See Comments)     Causes broken blood vessels and bruises at 4 times normal dose.    Statins-hmg-coa reductase inhibitors Other (See Comments)     Statins cause intolerable myalgias.    Iodinated contrast media Hives     Tachycardia    Omeprazole Hives and Itching    Prilosec [omeprazole magnesium] Hives and Itching         History of Present Illness     HPI    2/7/2024, 12:51 PM  History obtained from the patient      History of Present Illness: Erendira Pretty is a 78 y.o. male patient with a PMHx of DM2, CAD, Colon Polyp who presents to the Emergency Department for evaluation of anemia. Pt is being followed by GI for blood in stool and had labwork done by his PCP and results showed he anemia. Besides from recognizing black stools he feels normal. Last black stool noted was this past Saturday. Symptoms are episodic and moderate in severity. No mitigating or exacerbating factors reported. Patient denies any weakness, dysuria, diarrhea, cp, sob, and all other sxs at this time. No prior Tx reported. No further complaints or concerns at this time.       Arrival mode: Personal vehicle     PCP: Bhupinder Callaway MD        Past Medical History:  Past Medical History:   Diagnosis Date    Anticoagulant long-term use     Aortic stenosis     Asthma     Benign prostatic hyperplasia with nocturia     Bilateral carotid artery stenosis 07/21/2021     CAROTID BILATERAL 07/14/2021 IMPRESSION: Atherosclerosis with suspected stenosis greater than 50% at the right proximal ICA  visually. Velocities are discordant and appear improved from prior. Atherosclerosis on the left with no evidence of flow-limiting stenosis greater than 50%.  FINDINGS: Right: Internal Carotid Artery (ICA): Peak systolic velocity 118 cm/sec. 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 09/22/2020    DELONTE on CPAP     Osteoarthritis     Paroxysmal atrial fibrillation     PVD (peripheral vascular disease)     Secondary esophageal varices without bleeding 06/26/2017    Type 2 diabetes mellitus        Past Surgical History:  Past Surgical History:   Procedure Laterality Date    ANGIOGRAM, EXTREMITY, UNILATERAL Left 1/4/2024    Procedure: ANGIOGRAM, EXTREMITY, UNILATERAL- Femoral / SMS Stent, Poss General;  Surgeon: ALEX Alfred III, MD;  Location: [facility] OR [address] [place] [address];  Service: Vascular;  Laterality: Left;  mGy : 2698.78  Gy.cm : 229.88  Contrast : 62ml  Fluro time : 13.8min    CARDIAC CATHETERIZATION      CARDIAC SURGERY      CARPAL TUNNEL RELEASE Left     CATARACT EXTRACTION      CATARACT EXTRACTION W/ INTRAOCULAR LENS  IMPLANT, BILATERAL  2008    CATHETERIZATION OF BOTH LEFT AND RIGHT HEART N/A 11/2/2018    Procedure: CATHETERIZATION, HEART, BOTH LEFT AND RIGHT;  Surgeon: Frank Lazo MD;  Location: [facility] CATH LAB;  Service: Cardiology;  Laterality: N/A;    COLONOSCOPY  8/2013    COLONOSCOPY N/A 5/30/2016    Procedure: COLONOSCOPY;  Surgeon: Anna Tomas MD;  Location: [facility] ENDO;  Service: Endoscopy;  Laterality: N/A;    COLONOSCOPY N/A  7/17/2019    Procedure: COLONOSCOPY;  Surgeon: David Carter III, MD;  Location: [facility] ENDO;  Service: Endoscopy;  Laterality: N/A;    COLONOSCOPY N/A 12/14/2023    Procedure: COLONOSCOPY;  Surgeon: Ama Barrera MD;  Location: [facility] ENDO;  Service: Endoscopy;  Laterality: N/A;    COMPUTED TOMOGRAPHY N/A 9/9/2019    Procedure: CT (COMPUTED TOMOGRAPHY);  Surgeon: [facility] Provider;  Location: [facility] OR;  Service: General;  Laterality: N/A;  Microwave ablation to be done in CT.  Need CRNA and cart    COMPUTED TOMOGRAPHY N/A 1/25/2022    Procedure: Liver Microwave Ablation;  Surgeon: Red Puentes MD;  Location: [facility];  Service: General;  Laterality: N/A;    CORONARY ARTERY BYPASS GRAFT (CABG) N/A 11/5/2018    Procedure: CORONARY ARTERY BYPASS GRAFT (CABG);  Surgeon: Bear De Luna MD;  Location: [facility] OR;  Service: Cardiothoracic;  Laterality: N/A;  TWO VESSEL BYPASS WITH AORTOTOMY AND EXCISION OF ATHEROSCLEROTIC PLAQUE    CORONARY BYPASS GRAFT ANGIOGRAPHY  3/2/2020    Procedure: Bypass graft study;  Surgeon: Cora Cormier MD;  Location: [facility] CATH LAB;  Service: Cardiology;;    ELBOW BURSA SURGERY Right 2010    ENDOSCOPIC HARVEST OF VEIN Left 11/5/2018    Procedure: SURGICAL PROCUREMENT, VEIN, ENDOSCOPIC;  Surgeon: Bear De Luna MD;  Location: [facility] OR;  Service: Cardiothoracic;  Laterality: Left;    ESOPHAGOGASTRODUODENOSCOPY N/A 7/17/2019    Procedure: ESOPHAGOGASTRODUODENOSCOPY (EGD);  Surgeon: David Carter III, MD;  Location: [facility] ENDO;  Service: Endoscopy;  Laterality: N/A;    ESOPHAGOGASTRODUODENOSCOPY N/A 12/29/2022    Procedure: ESOPHAGOGASTRODUODENOSCOPY (EGD);  Surgeon: Issa Gayle MD;  Location: [facility] ENDO;  Service: Endoscopy;  Laterality: N/A;    ESOPHAGOGASTRODUODENOSCOPY N/A 1/17/2024    Procedure: EGD (ESOPHAGOGASTRODUODENOSCOPY);  Surgeon: Issa Gayle MD;  Location: [facility] ENDO;  Service: Endoscopy;  Laterality: N/A;    ETHMOIDECTOMY Bilateral 3/27/2019     Procedure: ETHMOIDECTOMY;  Surgeon: Alejandro Parkinson MD;  Location: [facility] OR;  Service: ENT;  Laterality: Bilateral;    EYE SURGERY      FOOT SURGERY      FRONTAL SINUS OBLITERATION Bilateral 3/27/2019    Procedure: SINUSOTOMY, FRONTAL SINUS, OBLITERATIVE;  Surgeon: Alejandro Parkinson MD;  Location: [facility] OR;  Service: ENT;  Laterality: Bilateral;    FUNCTIONAL ENDOSCOPIC SINUS SURGERY (FESS) Bilateral 3/27/2019    Procedure: FESS (FUNCTIONAL ENDOSCOPIC SINUS SURGERY);  Surgeon: Alejandro Parkinson MD;  Location: [facility] OR;  Service: ENT;  Laterality: Bilateral;  Left Keila bullosa resection     INTRALUMINAL GASTROINTESTINAL TRACT IMAGING VIA CAPSULE N/A 2/2/2024    Procedure: IMAGING PROCEDURE, GI TRACT, INTRALUMINAL, VIA CAPSULE;  Surgeon: Cooper Estrella RN;  Location: [facility];  Service: Endoscopy;  Laterality: N/A;    KNEE ARTHROSCOPY W/ MENISCAL REPAIR Left 06/2019    dr boykin    KNEE SURGERY Right     LEFT HEART CATHETERIZATION Left 3/2/2020    Procedure: CATHETERIZATION, HEART, LEFT;  Surgeon: Cora Cormier MD;  Location: [facility] CATH LAB;  Service: Cardiology;  Laterality: Left;    LIVER BIOPSY  01/2022    MAXILLARY ANTROSTOMY Bilateral 3/27/2019    Procedure: MAXILLARY ANTROSTOMY;  Surgeon: Alejandro Parkinson MD;  Location: [facility] OR;  Service: ENT;  Laterality: Bilateral;    NASAL SEPTOPLASTY N/A 3/27/2019    Procedure: SEPTOPLASTY, NOSE;  Surgeon: Alejandro Parkinson MD;  Location: [facility];  Service: ENT;  Laterality: N/A;    RECTAL SURGERY  2012    STENT, SUPERIOR MESENTERIC ARTERY Left 1/4/2024    Procedure: STENT, SUPERIOR MESENTERIC ARTERY;  Surgeon: ALEX Alfred III, MD;  Location: [address];  Service: Vascular;  Laterality: Left;    TRANSURETHRAL RESECTION OF PROSTATE (TURP) WITHOUT USE OF LASER N/A 6/19/2018    Procedure: TURP, WITHOUT USING LASER;  Surgeon: Cooper Cordova IV, MD;  Location: [facility];  Service: Urology;  Laterality: N/A;    TRIGGER FINGER RELEASE Left          Family History:  Family History   Problem Relation Age  of Onset    Heart disease Mother     Heart disease Father     Heart disease Brother     Colon cancer Neg Hx        Social History:  Social History     Tobacco Use    Smoking status: Former     Current packs/day: 0.00     Average packs/day: 0.5 packs/day for 4.0 years (2.0 ttl pk-yrs)     Types: Cigarettes     Start date: 1968     Quit date: 1972     Years since quittin.6    Smokeless tobacco: Former     Types: Chew     Quit date: 1976   Substance and Sexual Activity    Alcohol use: Yes     Alcohol/week: 2.0 standard drinks of alcohol     Types: 2 Cans of beer per week    Drug use: No    Sexual activity: Yes     Partners: Female        Review of Systems     Review of Systems   Constitutional:  Negative for fever.   HENT:  Negative for sore throat.    Respiratory:  Negative for shortness of breath.    Cardiovascular:  Negative for chest pain.   Gastrointestinal:  Positive for blood in stool. Negative for nausea.   Genitourinary:  Negative for dysuria.   Musculoskeletal:  Negative for back pain.   Skin:  Negative for rash.   Neurological:  Negative for weakness.   Hematological:  Does not bruise/bleed easily.   All other systems reviewed and are negative.       Physical Exam     Initial Vitals [24 1036]   BP Pulse Resp Temp SpO2   (!) 158/80 92 18 98.7 °F (37.1 °C) 99 %      MAP       --          Physical Exam  Nursing Notes and Vital Signs Reviewed.  Constitutional: Patient is in no acute distress. Well-developed and well-nourished.  Head: Atraumatic. Normocephalic.  Eyes:  EOM intact.  No scleral icterus.  ENT: Mucous membranes are moist.  Nares clear   Neck:  Full ROM. No JVD.  Cardiovascular: Regular rate. Regular rhythm No murmurs, rubs, or gallops. Distal pulses are 2+ and symmetric  Pulmonary/Chest: No respiratory distress. Clear to auscultation bilaterally. No wheezing or rales.  Equal chest wall rise bilaterally  Abdominal: Soft and non-distended.  There is no tenderness.  No  rebound, guarding, or rigidity. Good bowel sounds.  Digital rectal exam performed.  Brown stool.  No mass  Genitourinary: No CVA tenderness.  No suprapubic tenderness  Musculoskeletal: Moves all extremities. No obvious deformities.  5 x 5 strength in all extremities   Skin: Warm and dry.  Neurological:  Alert, awake, and appropriate.  Normal speech.  No acute focal neurological deficits are appreciated.  Two through 12 intact bilaterally.  Psychiatric: Normal affect. Good eye contact. Appropriate in content.      ED Course   Critical Care    Date/Time: 2/7/2024 2:03 PM    Performed by: Daryl Arevalo Jr., MD  Authorized by: Daryl Arevalo Jr., MD  Direct patient critical care time: 10 minutes  Additional history critical care time: 6 minutes  Ordering / reviewing critical care time: 6 minutes  Documentation critical care time: 7 minutes  Consulting other physicians critical care time: 5 minutes  Consult with family critical care time: 7 minutes  Total critical care time (exclusive of procedural time) : 41 minutes  Critical care time was exclusive of separately billable procedures and treating other patients and teaching time.  Critical care was necessary to treat or prevent imminent or life-threatening deterioration of the following conditions: Blood Transfusion.  Critical care was time spent personally by me on the following activities: blood draw for specimens, development of treatment plan with patient or surrogate, discussions with consultants, interpretation of cardiac output measurements, evaluation of patient's response to treatment, examination of patient, discussions with primary provider, obtaining history from patient or surrogate, ordering and performing treatments and interventions, ordering and review of laboratory studies, ordering and review of radiographic studies, pulse oximetry, re-evaluation of patient's condition and review of old charts.        ED Vital Signs:  Vitals:    02/07/24 1036  02/07/24 1427 02/07/24 1432 02/07/24 1445   BP: (!) 158/80 (!) 149/72 (!) 142/71 (!) 166/74   Pulse: 92 77 75 76   Resp: 18 17 16 15   Temp: 98.7 °F (37.1 °C) 98.1 °F (36.7 °C) 98 °F (36.7 °C) 98 °F (36.7 °C)   TempSrc: Oral Oral Oral Oral   SpO2: 99% 100% 100% 100%   Weight: 101.5 kg (223 lb 14 oz)          Abnormal Lab Results:  Labs Reviewed   CBC W/ AUTO DIFFERENTIAL - Abnormal; Notable for the following components:       Result Value    RBC 3.19 (*)     Hemoglobin 7.8 (*)     Hematocrit 25.9 (*)     MCV 81 (*)     MCH 24.5 (*)     MCHC 30.1 (*)     RDW 15.6 (*)     All other components within normal limits   COMPREHENSIVE METABOLIC PANEL - Abnormal; Notable for the following components:    Glucose 114 (*)     All other components within normal limits   OCCULT BLOOD X 1, STOOL   TYPE & SCREEN   PREPARE RBC SOFT        All Lab Results:  Results for orders placed or performed during the hospital encounter of 02/07/24   CBC auto differential   Result Value Ref Range    WBC 5.86 3.90 - 12.70 K/uL    RBC 3.19 (L) 4.60 - 6.20 M/uL    Hemoglobin 7.8 (L) 14.0 - 18.0 g/dL    Hematocrit 25.9 (L) 40.0 - 54.0 %    MCV 81 (L) 82 - 98 fL    MCH 24.5 (L) 27.0 - 31.0 pg    MCHC 30.1 (L) 32.0 - 36.0 g/dL    RDW 15.6 (H) 11.5 - 14.5 %    Platelets 260 150 - 450 K/uL    MPV 10.3 9.2 - 12.9 fL    Immature Granulocytes 0.2 0.0 - 0.5 %    Gran # (ANC) 3.3 1.8 - 7.7 K/uL    Immature Grans (Abs) 0.01 0.00 - 0.04 K/uL    Lymph # 1.6 1.0 - 4.8 K/uL    Mono # 0.7 0.3 - 1.0 K/uL    Eos # 0.2 0.0 - 0.5 K/uL    Baso # 0.05 0.00 - 0.20 K/uL    nRBC 0 0 /100 WBC    Gran % 56.9 38.0 - 73.0 %    Lymph % 27.0 18.0 - 48.0 %    Mono % 11.8 4.0 - 15.0 %    Eosinophil % 3.2 0.0 - 8.0 %    Basophil % 0.9 0.0 - 1.9 %    Differential Method Automated    Comprehensive metabolic panel   Result Value Ref Range    Sodium 141 136 - 145 mmol/L    Potassium 4.2 3.5 - 5.1 mmol/L    Chloride 106 95 - 110 mmol/L    CO2 24 23 - 29 mmol/L    Glucose 114 (H) 70 -  110 mg/dL    BUN 14 8 - 23 mg/dL    Creatinine 1.1 0.5 - 1.4 mg/dL    Calcium 8.8 8.7 - 10.5 mg/dL    Total Protein 7.2 6.0 - 8.4 g/dL    Albumin 3.7 3.5 - 5.2 g/dL    Total Bilirubin 0.4 0.1 - 1.0 mg/dL    Alkaline Phosphatase 84 55 - 135 U/L    AST 22 10 - 40 U/L    ALT 19 10 - 44 U/L    eGFR >60 >60 mL/min/1.73 m^2    Anion Gap 11 8 - 16 mmol/L   Occult blood x 1, stool    Specimen: Stool   Result Value Ref Range    Occult Blood Negative Negative   Type & Screen   Result Value Ref Range    Group & Rh O POS     Indirect Rock NEG     Specimen Outdate 02/10/2024 23:59    Prepare RBC 1 Unit   Result Value Ref Range    UNIT NUMBER [id number]     Product Code [id number]     DISPENSE STATUS ISSUED     CODING SYSTEM CXMW887     Unit Blood Type Code 5100     Unit Blood Type O POS     Unit Expiration [id number]     CROSSMATCH INTERPRETATION Compatible      *Note: Due to a large number of results and/or encounters for the requested time period, some results have not been displayed. A complete set of results can be found in Results Review.        Imaging Results:  Imaging Results    None               ED Discussion       1:48 PM: Reassessed pt at this time. Discussed with pt all pertinent ED information and results. Discussed pt dx and plan of tx. Gave pt all f/u and return to the ED instructions. All questions and concerns were addressed at this time. Pt expresses understanding of information and instructions, and is comfortable with plan to discharge. Pt is stable for discharge.    I discussed with patient and/or family/caretaker that evaluation in the ED does not suggest any emergent or life threatening medical conditions requiring immediate intervention beyond what was provided in the ED, and I believe patient is safe for discharge.  Regardless, an unremarkable evaluation in the ED does not preclude the development or presence of a serious of life threatening condition. As such, patient was instructed to return  immediately for any worsening or change in current symptoms.       2:12 PM  I discussed the case with Phi Gil with GI.  She is reviewed the chart.  She defers the transfusion to me and notes that from their standpoint there is nothing further that they can do today.  They recommended heme Onc evaluation as scheduled.  Patient is in agreement with this    Medical Decision Making  Differential diagnosis:  GI bleed, upper GI bleed, lower GI bleed, anemia, symptomatic anemia    Patient was symptomatic anemia.  He has had recurrent GI bleeds of unknown source.  He notes bleeding resolved several days ago but is now fatigued and short of breath.  Reviewed his labs.  His last hemoglobin was 7.2.  He is come up a bit today to 7.8 however he is symptomatic.  I did discuss the case with his GI doctors and primary care physician.  He is pending referral to hematology for further evaluation of this recurrent issue.  They have not found a cause you scopes and video capsules of his blood loss concerned for possible decreased production.  I have discussed this with the patient at length.  He verbalized agreement understanding with the plan of care.  a    Amount and/or Complexity of Data Reviewed  External Data Reviewed: labs and notes.  Labs: ordered. Decision-making details documented in ED Course.  Discussion of management or test interpretation with external provider(s): Discussed the case with both Dr. Callaway and GI    Risk  OTC drugs.  Prescription drug management.  Drug therapy requiring intensive monitoring for toxicity.  Decision regarding hospitalization.    Critical Care  Total time providing critical care: 41 minutes                ED Medication(s):  Medications   0.9%  NaCl infusion (for blood administration) (has no administration in time range)       New Prescriptions    No medications on file        Follow-up Information       Bhupinder Callaway MD.    Specialties: Internal Medicine, Pediatrics  Contact  information:  [address]  [place] [address]  [phone]                                 Scribe Attestation:   Scribe #1: I performed the above scribed service and the documentation accurately describes the services I performed. I attest to the accuracy of the note.     Attending:   Physician Attestation Statement for Scribe #1: I, Daryl Arevalo Jr., MD, personally performed the services described in this documentation, as scribed by Deedee Greenberg, in my presence, and it is both accurate and complete.           Clinical Impression       ICD-10-CM ICD-9-CM   1. Symptomatic anemia  D64.9 285.9       Disposition:   Disposition: Discharged  Condition: Stable        Daryl Arevalo Jr., MD  02/07/24 1412       Daryl Arevalo Jr., MD  02/07/24 1506

## 2024-02-07 NOTE — FIRST PROVIDER EVALUATION
Medical screening examination initiated.  I have conducted a focused provider triage encounter, findings are as follows:    Brief history of present illness:  patient presents to ER for anemia. Reports recent GI bleed.  There were no vitals filed for this visit.    Pertinent physical exam:  no acute distress    Brief workup plan:  workup    Preliminary workup initiated; this workup will be continued and followed by the physician or advanced practice provider that is assigned to the patient when roomed.

## 2024-02-07 NOTE — PROGRESS NOTES
Subjective:   Patient ID:  Erendira Pretty is a 78 y.o. male who presents for follow-up of No chief complaint on file.  GI w/u for GI bleed. Pt to go to ER with apparent anemia. Pt with recent melena after recent celiac stent?  Patient denies CP, angina or anginal equivalent.  Hypertension  This is a chronic problem. The current episode started more than 1 year ago. The problem has been gradually improving since onset. The problem is controlled. Pertinent negatives include no chest pain, palpitations or shortness of breath. Past treatments include ACE inhibitors, beta blockers and calcium channel blockers. The current treatment provides moderate improvement. There are no compliance problems.  Hypertensive end-organ damage includes CAD/MI.   Coronary Artery Disease  Presents for follow-up visit. Pertinent negatives include no chest pain, chest pressure, chest tightness, dizziness, leg swelling, muscle weakness, palpitations, shortness of breath or weight gain. The symptoms have been stable. Compliance with diet is variable. Compliance with exercise is variable. Compliance with medications is good.   Hyperlipidemia  This is a chronic problem. The current episode started more than 1 year ago. The problem is controlled. Pertinent negatives include no chest pain or shortness of breath. Treatments tried: repatha. The current treatment provides moderate improvement of lipids. There are no compliance problems.        Review of Systems   Constitutional: Negative. Negative for weight gain.   HENT: Negative.     Eyes: Negative.    Cardiovascular: Negative.  Negative for chest pain, leg swelling and palpitations.   Respiratory: Negative.  Negative for chest tightness and shortness of breath.    Endocrine: Negative.    Hematologic/Lymphatic: Negative.    Skin: Negative.    Musculoskeletal:  Negative for muscle weakness.   Gastrointestinal: Negative.    Genitourinary: Negative.    Neurological: Negative.  Negative for dizziness.    Psychiatric/Behavioral: Negative.     Allergic/Immunologic: Negative.    All other systems reviewed and are negative.    Family History   Problem Relation Age of Onset    Heart disease Mother     Heart disease Father     Heart disease Brother     Colon cancer Neg Hx      Past Medical History:   Diagnosis Date    Anticoagulant long-term use     Aortic stenosis     Asthma     Benign prostatic hyperplasia with nocturia     Bilateral carotid artery stenosis 2021    US CAROTID BILATERAL 2021 IMPRESSION: Atherosclerosis with suspected stenosis greater than 50% at the right proximal ICA visually. Velocities are discordant and appear improved from prior. Atherosclerosis on the left with no evidence of flow-limiting stenosis greater than 50%.  FINDINGS: Right: Internal Carotid Artery (ICA): Peak systolic velocity 118 cm/sec. End diastolic velocity 35 cm/sec    BPH (benign prostatic hyperplasia)     CAD (coronary artery disease)     40% lesion ;lazo    Cirrhosis     Claudication 2014    Colon polyp     COPD (chronic obstructive pulmonary disease)     ED (erectile dysfunction)     Encounter for blood transfusion     Ex-smoker     Hearing loss NEC     Hepatitis C     Cured;reji brown     Hyperlipidemia     Hypertension     Hypothyroid     s/p tx graves    Open angle with borderline findings and low glaucoma risk in both eyes 2020    DELONTE on CPAP     Osteoarthritis     Paroxysmal atrial fibrillation     PVD (peripheral vascular disease)     Secondary esophageal varices without bleeding 2017    Type 2 diabetes mellitus      Social History     Socioeconomic History    Marital status:      Spouse name: YAEL    Number of children: 2   Tobacco Use    Smoking status: Former     Current packs/day: 0.00     Average packs/day: 0.5 packs/day for 4.0 years (2.0 total pack years)     Types: Cigarettes     Start date: 1968     Quit date: 1972     Years since quittin.6     Smokeless tobacco: Former     Types: Chew     Quit date: 11/18/1976   Substance and Sexual Activity    Alcohol use: Yes     Alcohol/week: 2.0 standard drinks of alcohol     Types: 2 Cans of beer per week    Drug use: No    Sexual activity: Yes     Partners: Female   Social History Narrative    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    No pets or smokers in household, lives alone.     Current Outpatient Medications on File Prior to Visit   Medication Sig Dispense Refill    ACCU-CHEK GRACE PLUS METER Misc AS DIRECTED 1 each 0    albuterol (VENTOLIN HFA) 90 mcg/actuation inhaler Inhale 2 puffs into the lungs every 6 (six) hours as needed for Wheezing or Shortness of Breath. Rescue 18 g 3    amLODIPine (NORVASC) 10 MG tablet TAKE 1 TABLET BY MOUTH ONCE A DAY (DOSE INCREASE) (Patient taking differently: Take by mouth every morning.) 30 tablet 11    blood sugar diagnostic (ACCU-CHEK GRACE PLUS TEST STRP) Strp TEST THREE TIMES A  strip 11    budesonide-formoterol 160-4.5 mcg (SYMBICORT) 160-4.5 mcg/actuation HFAA INHALE 2 PUFFS INTO THE LUNGS TWO TIMES A DAY. 10.2 g 11    carvediloL (COREG) 3.125 MG tablet Take 1 tablet (3.125 mg total) by mouth 2 (two) times daily with meals. 60 tablet 5    empagliflozin (JARDIANCE) 25 mg tablet Take 1 tablet (25 mg total) by mouth once daily. (Patient taking differently: Take 25 mg by mouth every morning.) 90 tablet 1    finasteride (PROSCAR) 5 mg tablet TAKE 1 TABLET BY MOUTH once daily 90 tablet 2    furosemide (LASIX) 40 MG tablet Take 1 tablet (40 mg total) by mouth 2 (two) times daily. 60 tablet 11    GLUCOSAMINE HCL/CHONDR ROSARIO A NA (OSTEO BI-FLEX ORAL) Take 1 tablet by mouth 2 (two) times daily.       insulin (LANTUS SOLOSTAR U-100 INSULIN) glargine 100 units/mL SubQ pen Inject 44 Units into the skin every evening. 45 mL 1    ipratropium (ATROVENT) 21 mcg (0.03 %) nasal spray 2 sprays by Each Nostril route 2  "(two) times daily. 30 mL 0    lancets (ACCU-CHEK FASTCLIX LANCET DRUM) Misc TEST TWO TIMES A  each 5    levocetirizine (XYZAL) 5 MG tablet Take 1 tablet (5 mg total) by mouth every evening. 30 tablet 11    levothyroxine (SYNTHROID) 300 MCG Tab Take 1 tablet (300 mcg total) by mouth every morning. 90 tablet 3    lisinopriL 10 MG tablet TAKE 1 BY MOUTH EVERY DAY (Patient taking differently: Take 10 mg by mouth every morning.) 30 tablet 7    metFORMIN (GLUCOPHAGE-XR) 500 MG ER 24hr tablet Take 1 tablet (500 mg total) by mouth 2 (two) times daily with meals. 180 tablet 3    montelukast (SINGULAIR) 10 mg tablet Take 1 tablet (10 mg total) by mouth once daily. (Patient taking differently: Take 10 mg by mouth every evening.) 90 tablet 3    pen needle, diabetic (BD ULTRA-FINE MINI PEN NEEDLE) 31 gauge x 3/16" Ndle USE AS DIRECTED 100 each 11    propafenone (RHTHYMOL) 150 MG Tab Take 1 tablet (150 mg total) by mouth every 8 (eight) hours. 90 tablet 11    RABEprazole (ACIPHEX) 20 mg tablet Take 1 tablet (20 mg total) by mouth 2 (two) times daily. 180 tablet 3    REPATHA SURECLICK 140 mg/mL PnIj SMARTSI Milligram(s) Topical Every 2 Weeks      sildenafiL (VIAGRA) 100 MG tablet Take 1/2 to 1 tablet by mouth one hour prior to intercourse. Max 100mg per day 30 tablet 11     Current Facility-Administered Medications on File Prior to Visit   Medication Dose Route Frequency Provider Last Rate Last Admin    lactated ringers infusion   Intravenous Continuous Omaira Theodore MD   New Bag at 19 1117     Review of patient's allergies indicates:   Allergen Reactions    Lipitor [atorvastatin] Other (See Comments)     Muscle aches and pains as well as fatigue.     Niacin preparations Other (See Comments)     Causes broken blood vessels and bruises at 4 times normal dose.    Statins-hmg-coa reductase inhibitors Other (See Comments)     Statins cause intolerable myalgias.    Iodinated contrast media Hives     " Tachycardia    Omeprazole Hives and Itching    Prilosec [omeprazole magnesium] Hives and Itching       Objective:     Physical Exam  Vitals and nursing note reviewed.   Constitutional:       Appearance: He is well-developed.   HENT:      Head: Normocephalic and atraumatic.   Eyes:      Conjunctiva/sclera: Conjunctivae normal.      Pupils: Pupils are equal, round, and reactive to light.   Cardiovascular:      Rate and Rhythm: Normal rate and regular rhythm.      Pulses: Intact distal pulses.      Heart sounds: Normal heart sounds.   Pulmonary:      Effort: Pulmonary effort is normal.      Breath sounds: Normal breath sounds.   Abdominal:      General: Bowel sounds are normal.      Palpations: Abdomen is soft.   Musculoskeletal:      Cervical back: Normal range of motion and neck supple.   Skin:     General: Skin is warm and dry.   Neurological:      Mental Status: He is alert and oriented to person, place, and time.         Assessment:     1. Aortic atherosclerosis    2. Paroxysmal atrial fibrillation    3. Anticoagulant causing adverse effect in therapeutic use, subsequent encounter    4. Bilateral carotid artery stenosis    5. Coronary artery disease involving native coronary artery of native heart without angina pectoris    6. Essential hypertension    7. Mixed hyperlipidemia    8. PVD (peripheral vascular disease)    9. S/P CABG x 2    10. PVC (premature ventricular contraction)    11. BMI 36.0-36.9,adult    12. DNR (do not resuscitate)    13. DELONTE on CPAP    14. Class 2 severe obesity due to excess calories with serious comorbidity and body mass index (BMI) of 37.0 to 37.9 in adult    15. Angina pectoris        Plan:     Aortic atherosclerosis    Paroxysmal atrial fibrillation  -     Ambulatory referral/consult to Cardiology    Anticoagulant causing adverse effect in therapeutic use, subsequent encounter  -     Ambulatory referral/consult to Cardiology    Bilateral carotid artery stenosis    Coronary artery  disease involving native coronary artery of native heart without angina pectoris    Essential hypertension    Mixed hyperlipidemia    PVD (peripheral vascular disease)    S/P CABG x 2    PVC (premature ventricular contraction)    BMI 36.0-36.9,adult    DNR (do not resuscitate)    DELONTE on CPAP    Class 2 severe obesity due to excess calories with serious comorbidity and body mass index (BMI) of 37.0 to 37.9 in adult    Angina pectoris      Pt to go to ER per GI    continue coreg 1/2 tab bid- HTN  Continue lisinopril , norvasc - HTN  continue repatha - HLP  Continue propafenone- PAF  Dc xarelto

## 2024-02-07 NOTE — DISCHARGE INSTRUCTIONS
There has no blood in her stool today.  I have discussed the case with GI.  They recommend follow-up with heme Onc as has been arranged by Dr. Boykin.  I have provided unit of blood help you with your symptoms.  Please return as needed

## 2024-02-08 ENCOUNTER — TELEPHONE (OUTPATIENT)
Dept: HEMATOLOGY/ONCOLOGY | Facility: CLINIC | Age: 79
End: 2024-02-08
Payer: MEDICARE

## 2024-02-08 ENCOUNTER — PATIENT MESSAGE (OUTPATIENT)
Dept: HEMATOLOGY/ONCOLOGY | Facility: CLINIC | Age: 79
End: 2024-02-08
Payer: MEDICARE

## 2024-02-08 DIAGNOSIS — E03.9 ACQUIRED HYPOTHYROIDISM: Chronic | ICD-10-CM

## 2024-02-08 NOTE — TELEPHONE ENCOUNTER
No care due was identified.  [facility] Embedded Care Due Messages. Reference number: [id number].   2/08/2024 7:31:33 AM CST

## 2024-02-09 RX ORDER — LEVOTHYROXINE SODIUM 300 UG/1
300 TABLET ORAL EVERY MORNING
Qty: 90 TABLET | Refills: 3 | Status: SHIPPED | OUTPATIENT
Start: 2024-02-09 | End: 2024-05-10 | Stop reason: SDUPTHER

## 2024-02-14 ENCOUNTER — TELEPHONE (OUTPATIENT)
Dept: INTERNAL MEDICINE | Facility: CLINIC | Age: 79
End: 2024-02-14
Payer: MEDICARE

## 2024-02-14 DIAGNOSIS — D50.0 IRON DEFICIENCY ANEMIA DUE TO CHRONIC BLOOD LOSS: Primary | ICD-10-CM

## 2024-02-14 NOTE — TELEPHONE ENCOUNTER
Spoke with patient and scheduled him for STAT labs per Dr. Callaway tomorrow. He verbalized understanding. Informed him that I was sending a message to hematology to reach out to him to schedule an appointment.

## 2024-02-14 NOTE — TELEPHONE ENCOUNTER
Pt would like to know can you put lab orders in for him. He did not state which specific labs he needs, he just stated that he has been off his blood thinner medication for a while.

## 2024-02-14 NOTE — TELEPHONE ENCOUNTER
CBC and chemistry ordered stat. Can get today or tomorrow. He also has hematology consult placed on 2/8 but not appt yet. Staff to set up labs and contact hematology staff to set up appt asap

## 2024-02-15 ENCOUNTER — LAB VISIT (OUTPATIENT)
Dept: LAB | Facility: HOSPITAL | Age: 79
End: 2024-02-15
Attending: PEDIATRICS
Payer: MEDICARE

## 2024-02-15 DIAGNOSIS — D50.0 IRON DEFICIENCY ANEMIA DUE TO CHRONIC BLOOD LOSS: ICD-10-CM

## 2024-02-15 LAB
ALBUMIN SERPL BCP-MCNC: 3.4 G/DL (ref 3.5–5.2)
ALP SERPL-CCNC: 86 U/L (ref 55–135)
ALT SERPL W/O P-5'-P-CCNC: 19 U/L (ref 10–44)
ANION GAP SERPL CALC-SCNC: 6 MMOL/L (ref 8–16)
AST SERPL-CCNC: 21 U/L (ref 10–40)
BASOPHILS # BLD AUTO: 0.03 K/UL (ref 0–0.2)
BASOPHILS NFR BLD: 0.7 % (ref 0–1.9)
BILIRUB SERPL-MCNC: 0.3 MG/DL (ref 0.1–1)
BUN SERPL-MCNC: 15 MG/DL (ref 8–23)
CALCIUM SERPL-MCNC: 9 MG/DL (ref 8.7–10.5)
CHLORIDE SERPL-SCNC: 108 MMOL/L (ref 95–110)
CO2 SERPL-SCNC: 25 MMOL/L (ref 23–29)
CREAT SERPL-MCNC: 1 MG/DL (ref 0.5–1.4)
DIFFERENTIAL METHOD BLD: ABNORMAL
EOSINOPHIL # BLD AUTO: 0.1 K/UL (ref 0–0.5)
EOSINOPHIL NFR BLD: 3.4 % (ref 0–8)
ERYTHROCYTE [DISTWIDTH] IN BLOOD BY AUTOMATED COUNT: 16.5 % (ref 11.5–14.5)
EST. GFR  (NO RACE VARIABLE): >60 ML/MIN/1.73 M^2
GLUCOSE SERPL-MCNC: 166 MG/DL (ref 70–110)
HCT VFR BLD AUTO: 28.1 % (ref 40–54)
HGB BLD-MCNC: 8.5 G/DL (ref 14–18)
IMM GRANULOCYTES # BLD AUTO: 0.01 K/UL (ref 0–0.04)
IMM GRANULOCYTES NFR BLD AUTO: 0.2 % (ref 0–0.5)
LYMPHOCYTES # BLD AUTO: 1.2 K/UL (ref 1–4.8)
LYMPHOCYTES NFR BLD: 29.3 % (ref 18–48)
MCH RBC QN AUTO: 24.9 PG (ref 27–31)
MCHC RBC AUTO-ENTMCNC: 30.2 G/DL (ref 32–36)
MCV RBC AUTO: 82 FL (ref 82–98)
MONOCYTES # BLD AUTO: 0.5 K/UL (ref 0.3–1)
MONOCYTES NFR BLD: 10.9 % (ref 4–15)
NEUTROPHILS # BLD AUTO: 2.3 K/UL (ref 1.8–7.7)
NEUTROPHILS NFR BLD: 55.5 % (ref 38–73)
NRBC BLD-RTO: 0 /100 WBC
PLATELET # BLD AUTO: 213 K/UL (ref 150–450)
PMV BLD AUTO: 10.4 FL (ref 9.2–12.9)
POTASSIUM SERPL-SCNC: 4.3 MMOL/L (ref 3.5–5.1)
PROT SERPL-MCNC: 7 G/DL (ref 6–8.4)
RBC # BLD AUTO: 3.41 M/UL (ref 4.6–6.2)
SODIUM SERPL-SCNC: 139 MMOL/L (ref 136–145)
WBC # BLD AUTO: 4.13 K/UL (ref 3.9–12.7)

## 2024-02-15 PROCEDURE — 85025 COMPLETE CBC W/AUTO DIFF WBC: CPT | Performed by: PEDIATRICS

## 2024-02-15 PROCEDURE — 80053 COMPREHEN METABOLIC PANEL: CPT | Performed by: PEDIATRICS

## 2024-02-15 PROCEDURE — 36415 COLL VENOUS BLD VENIPUNCTURE: CPT | Mod: PN | Performed by: PEDIATRICS

## 2024-02-16 ENCOUNTER — TELEPHONE (OUTPATIENT)
Dept: OTOLARYNGOLOGY | Facility: CLINIC | Age: 79
End: 2024-02-16
Payer: MEDICARE

## 2024-02-16 DIAGNOSIS — I85.10 SECONDARY ESOPHAGEAL VARICES WITHOUT BLEEDING: ICD-10-CM

## 2024-02-16 DIAGNOSIS — J32.4 CHRONIC PANSINUSITIS: ICD-10-CM

## 2024-02-16 RX ORDER — MONTELUKAST SODIUM 10 MG/1
10 TABLET ORAL DAILY
Qty: 90 TABLET | Refills: 3 | Status: SHIPPED | OUTPATIENT
Start: 2024-02-16 | End: 2025-02-15

## 2024-02-16 RX ORDER — CARVEDILOL 3.12 MG/1
3.12 TABLET ORAL 2 TIMES DAILY WITH MEALS
Qty: 60 TABLET | Refills: 5 | Status: ON HOLD | OUTPATIENT
Start: 2024-02-16 | End: 2024-02-24

## 2024-02-16 NOTE — TELEPHONE ENCOUNTER
Singulair refilled as requested.    ----- Message from Jayda Beltran MA sent at 2/16/2024  1:35 PM CST -----  Pt is requesting a refill for this medication  ----- Message -----  From: Becki Nixon  Sent: 2/16/2024   1:16 PM CST  To: Samson Correa Staff    Type:  RX Refill Request    Who Called: pt  Refill or New Rx:Refill  RX Name and Strength:montelukast 10 mg  How is the patient currently taking it? (ex. 1XDay):1XDay  Is this a 30 day or 90 day RX:30, he would like 90  Preferred Pharmacy with phone number:.  [facility] [id number] [facility] [id number] [address] [address]  Phone: [phone] Fax: [phone]  Local or Mail Order:local  Ordering Provider:Ms MARCELLA Davies   Would the patient rather a call back or a response via [facility]? call  Best Call Back Number: [phone]  Additional Information: .    Thank you

## 2024-02-23 ENCOUNTER — HOSPITAL ENCOUNTER (OUTPATIENT)
Facility: HOSPITAL | Age: 79
Discharge: HOME | End: 2024-02-24
Attending: EMERGENCY MEDICINE | Admitting: INTERNAL MEDICINE
Payer: MEDICARE

## 2024-02-23 DIAGNOSIS — R07.9 CHEST PAIN: ICD-10-CM

## 2024-02-23 DIAGNOSIS — R06.02 SOB (SHORTNESS OF BREATH): ICD-10-CM

## 2024-02-23 DIAGNOSIS — R20.2 LEFT LEG PARESTHESIAS: Primary | ICD-10-CM

## 2024-02-23 DIAGNOSIS — I48.0 PAF (PAROXYSMAL ATRIAL FIBRILLATION): ICD-10-CM

## 2024-02-23 DIAGNOSIS — I85.10 SECONDARY ESOPHAGEAL VARICES WITHOUT BLEEDING: ICD-10-CM

## 2024-02-23 DIAGNOSIS — R06.00 DYSPNEA: ICD-10-CM

## 2024-02-23 DIAGNOSIS — D64.9 ANEMIA, UNSPECIFIED TYPE: ICD-10-CM

## 2024-02-23 PROBLEM — R20.0 LEFT LEG NUMBNESS: Status: ACTIVE | Noted: 2024-02-23

## 2024-02-23 PROBLEM — R29.90 EPISODE OF TRANSIENT NEUROLOGIC SYMPTOMS: Status: ACTIVE | Noted: 2024-02-23

## 2024-02-23 PROBLEM — I20.89 EXERTIONAL ANGINA: Status: ACTIVE | Noted: 2018-11-02

## 2024-02-23 LAB
ABO + RH BLD: NORMAL
ALBUMIN SERPL BCP-MCNC: 3.7 G/DL (ref 3.5–5.2)
ALP SERPL-CCNC: 76 U/L (ref 55–135)
ALT SERPL W/O P-5'-P-CCNC: 15 U/L (ref 10–44)
ANION GAP SERPL CALC-SCNC: 11 MMOL/L (ref 8–16)
AORTIC ROOT ANNULUS: 3.64 CM
ASCENDING AORTA: 3.6 CM
AST SERPL-CCNC: 19 U/L (ref 10–40)
AV INDEX (PROSTH): 0.28
AV MEAN GRADIENT: 31 MMHG
AV PEAK GRADIENT: 57 MMHG
AV REGURGITATION PRESSURE HALF TIME: 490.17 MS
AV VALVE AREA BY VELOCITY RATIO: 0.91 CM²
AV VALVE AREA: 0.96 CM²
AV VELOCITY RATIO: 0.26
BACTERIA #/AREA URNS HPF: NORMAL /HPF
BASOPHILS # BLD AUTO: 0.02 K/UL (ref 0–0.2)
BASOPHILS NFR BLD: 0.5 % (ref 0–1.9)
BILIRUB SERPL-MCNC: 0.4 MG/DL (ref 0.1–1)
BILIRUB UR QL STRIP: NEGATIVE
BLD GP AB SCN CELLS X3 SERPL QL: NORMAL
BLD PROD TYP BPU: NORMAL
BLOOD UNIT EXPIRATION DATE: NORMAL
BLOOD UNIT TYPE CODE: 5100
BLOOD UNIT TYPE: NORMAL
BNP SERPL-MCNC: 283 PG/ML (ref 0–99)
BSA FOR ECHO PROCEDURE: 2.2 M2
BUN SERPL-MCNC: 15 MG/DL (ref 8–23)
CALCIUM SERPL-MCNC: 9.2 MG/DL (ref 8.7–10.5)
CHLORIDE SERPL-SCNC: 104 MMOL/L (ref 95–110)
CHOLEST SERPL-MCNC: 91 MG/DL (ref 120–199)
CHOLEST/HDLC SERPL: 2.1 {RATIO} (ref 2–5)
CLARITY UR: CLEAR
CO2 SERPL-SCNC: 25 MMOL/L (ref 23–29)
CODING SYSTEM: NORMAL
COLOR UR: COLORLESS
CREAT SERPL-MCNC: 1 MG/DL (ref 0.5–1.4)
CROSSMATCH INTERPRETATION: NORMAL
CV ECHO LV RWT: 0.57 CM
DIFFERENTIAL METHOD BLD: ABNORMAL
DISPENSE STATUS: NORMAL
DOP CALC AO PEAK VEL: 3.76 M/S
DOP CALC AO VTI: 85.3 CM
DOP CALC LVOT AREA: 3.5 CM2
DOP CALC LVOT DIAMETER: 2.1 CM
DOP CALC LVOT PEAK VEL: 0.99 M/S
DOP CALC LVOT STROKE VOLUME: 82.05 CM3
DOP CALC RVOT PEAK VEL: 0.81 M/S
DOP CALC RVOT VTI: 16 CM
DOP CALCLVOT PEAK VEL VTI: 23.7 CM
ECHO LV POSTERIOR WALL: 1.26 CM (ref 0.6–1.1)
EOSINOPHIL # BLD AUTO: 0.1 K/UL (ref 0–0.5)
EOSINOPHIL NFR BLD: 3.2 % (ref 0–8)
ERYTHROCYTE [DISTWIDTH] IN BLOOD BY AUTOMATED COUNT: 17.1 % (ref 11.5–14.5)
EST. GFR  (NO RACE VARIABLE): >60 ML/MIN/1.73 M^2
FERRITIN SERPL-MCNC: 13 NG/ML (ref 20–300)
FRACTIONAL SHORTENING: 29 % (ref 28–44)
GLUCOSE SERPL-MCNC: 108 MG/DL (ref 70–110)
GLUCOSE UR QL STRIP: ABNORMAL
HCT VFR BLD AUTO: 27.2 % (ref 40–54)
HDLC SERPL-MCNC: 43 MG/DL (ref 40–75)
HDLC SERPL: 47.3 % (ref 20–50)
HGB BLD-MCNC: 8.2 G/DL (ref 14–18)
HGB UR QL STRIP: NEGATIVE
IMM GRANULOCYTES # BLD AUTO: 0 K/UL (ref 0–0.04)
IMM GRANULOCYTES NFR BLD AUTO: 0 % (ref 0–0.5)
INR PPP: 1 (ref 0.8–1.2)
INTERVENTRICULAR SEPTUM: 1.33 CM (ref 0.6–1.1)
IRON SERPL-MCNC: 11 UG/DL (ref 45–160)
IVC DIAMETER: 2.13 CM
KETONES UR QL STRIP: NEGATIVE
LA MAJOR: 5.71 CM
LA MINOR: 6.14 CM
LA WIDTH: 4.7 CM
LDLC SERPL CALC-MCNC: 28.2 MG/DL (ref 63–159)
LEFT ATRIUM SIZE: 4.92 CM
LEFT ATRIUM VOLUME INDEX: 54.6 ML/M2
LEFT ATRIUM VOLUME: 116.3 CM3
LEFT INTERNAL DIMENSION IN SYSTOLE: 3.14 CM (ref 2.1–4)
LEFT VENTRICLE DIASTOLIC VOLUME INDEX: 42 ML/M2
LEFT VENTRICLE DIASTOLIC VOLUME: 89.47 ML
LEFT VENTRICLE MASS INDEX: 102 G/M2
LEFT VENTRICLE SYSTOLIC VOLUME INDEX: 18.4 ML/M2
LEFT VENTRICLE SYSTOLIC VOLUME: 39.13 ML
LEFT VENTRICULAR INTERNAL DIMENSION IN DIASTOLE: 4.44 CM (ref 3.5–6)
LEFT VENTRICULAR MASS: 216.84 G
LEUKOCYTE ESTERASE UR QL STRIP: NEGATIVE
LVOT MG: 2.07 MMHG
LVOT MV: 0.66 CM/S
LYMPHOCYTES # BLD AUTO: 1.3 K/UL (ref 1–4.8)
LYMPHOCYTES NFR BLD: 29.1 % (ref 18–48)
MCH RBC QN AUTO: 23.8 PG (ref 27–31)
MCHC RBC AUTO-ENTMCNC: 30.1 G/DL (ref 32–36)
MCV RBC AUTO: 79 FL (ref 82–98)
MICROSCOPIC COMMENT: NORMAL
MONOCYTES # BLD AUTO: 0.7 K/UL (ref 0.3–1)
MONOCYTES NFR BLD: 15.7 % (ref 4–15)
NEUTROPHILS # BLD AUTO: 2.3 K/UL (ref 1.8–7.7)
NEUTROPHILS NFR BLD: 51.5 % (ref 38–73)
NITRITE UR QL STRIP: NEGATIVE
NONHDLC SERPL-MCNC: 48 MG/DL
NRBC BLD-RTO: 0 /100 WBC
NUM UNITS TRANS PACKED RBC: NORMAL
OHS QRS DURATION: 80 MS
OHS QTC CALCULATION: 412 MS
PH UR STRIP: 7 [PH] (ref 5–8)
PISA AR MAX VEL: 4.04 M/S
PISA TR MAX VEL: 3.53 M/S
PLATELET # BLD AUTO: 195 K/UL (ref 150–450)
PMV BLD AUTO: 9.6 FL (ref 9.2–12.9)
POCT GLUCOSE: 99 MG/DL (ref 70–110)
POTASSIUM SERPL-SCNC: 4 MMOL/L (ref 3.5–5.1)
PROT SERPL-MCNC: 7.3 G/DL (ref 6–8.4)
PROT UR QL STRIP: NEGATIVE
PROTHROMBIN TIME: 11.7 SEC (ref 9–12.5)
PV MEAN GRADIENT: 1 MMHG
PV PEAK GRADIENT: 6 MMHG
PV PEAK VELOCITY: 1.2 M/S
RA MAJOR: 5.6 CM
RA PRESSURE ESTIMATED: 8 MMHG
RA WIDTH: 4.5 CM
RBC # BLD AUTO: 3.44 M/UL (ref 4.6–6.2)
RIGHT VENTRICULAR END-DIASTOLIC DIMENSION: 3.84 CM
RV TB RVSP: 12 MMHG
SATURATED IRON: 2 % (ref 20–50)
SODIUM SERPL-SCNC: 140 MMOL/L (ref 136–145)
SP GR UR STRIP: <1.005 (ref 1–1.03)
SPECIMEN OUTDATE: NORMAL
STJ: 2.96 CM
TOTAL IRON BINDING CAPACITY: 468 UG/DL (ref 250–450)
TR MAX PG: 50 MMHG
TRANSFERRIN SERPL-MCNC: 316 MG/DL (ref 200–375)
TRICUSPID ANNULAR PLANE SYSTOLIC EXCURSION: 1.84 CM
TRIGL SERPL-MCNC: 99 MG/DL (ref 30–150)
TROPONIN I SERPL DL<=0.01 NG/ML-MCNC: 0.01 NG/ML (ref 0–0.03)
TROPONIN I SERPL DL<=0.01 NG/ML-MCNC: 0.02 NG/ML (ref 0–0.03)
TROPONIN I SERPL DL<=0.01 NG/ML-MCNC: <0.006 NG/ML (ref 0–0.03)
TSH SERPL DL<=0.005 MIU/L-ACNC: 1.43 UIU/ML (ref 0.4–4)
TV REST PULMONARY ARTERY PRESSURE: 58 MMHG
URN SPEC COLLECT METH UR: ABNORMAL
UROBILINOGEN UR STRIP-ACNC: NEGATIVE EU/DL
WBC # BLD AUTO: 4.4 K/UL (ref 3.9–12.7)
YEAST URNS QL MICRO: NORMAL
Z-SCORE OF LEFT VENTRICULAR DIMENSION IN END DIASTOLE: -4.25
Z-SCORE OF LEFT VENTRICULAR DIMENSION IN END SYSTOLE: -2.19

## 2024-02-23 PROCEDURE — P9016 RBC LEUKOCYTES REDUCED: HCPCS | Performed by: NURSE PRACTITIONER

## 2024-02-23 PROCEDURE — 80061 LIPID PANEL: CPT | Performed by: EMERGENCY MEDICINE

## 2024-02-23 PROCEDURE — 93010 ELECTROCARDIOGRAM REPORT: CPT | Mod: ,,, | Performed by: INTERNAL MEDICINE

## 2024-02-23 PROCEDURE — 84484 ASSAY OF TROPONIN QUANT: CPT | Performed by: NURSE PRACTITIONER

## 2024-02-23 PROCEDURE — 25000003 PHARM REV CODE 250: Performed by: INTERNAL MEDICINE

## 2024-02-23 PROCEDURE — 85610 PROTHROMBIN TIME: CPT | Performed by: EMERGENCY MEDICINE

## 2024-02-23 PROCEDURE — 93005 ELECTROCARDIOGRAM TRACING: CPT

## 2024-02-23 PROCEDURE — 36415 COLL VENOUS BLD VENIPUNCTURE: CPT | Performed by: NURSE PRACTITIONER

## 2024-02-23 PROCEDURE — 94640 AIRWAY INHALATION TREATMENT: CPT

## 2024-02-23 PROCEDURE — 82962 GLUCOSE BLOOD TEST: CPT

## 2024-02-23 PROCEDURE — 86850 RBC ANTIBODY SCREEN: CPT | Performed by: NURSE PRACTITIONER

## 2024-02-23 PROCEDURE — 80053 COMPREHEN METABOLIC PANEL: CPT | Performed by: NURSE PRACTITIONER

## 2024-02-23 PROCEDURE — 86920 COMPATIBILITY TEST SPIN: CPT | Performed by: NURSE PRACTITIONER

## 2024-02-23 PROCEDURE — 92523 SPEECH SOUND LANG COMPREHEN: CPT

## 2024-02-23 PROCEDURE — 92610 EVALUATE SWALLOWING FUNCTION: CPT

## 2024-02-23 PROCEDURE — G0378 HOSPITAL OBSERVATION PER HR: HCPCS

## 2024-02-23 PROCEDURE — 84443 ASSAY THYROID STIM HORMONE: CPT | Performed by: EMERGENCY MEDICINE

## 2024-02-23 PROCEDURE — 36430 TRANSFUSION BLD/BLD COMPNT: CPT

## 2024-02-23 PROCEDURE — G0426 INPT/ED TELECONSULT50: HCPCS | Mod: 95,,, | Performed by: STUDENT IN AN ORGANIZED HEALTH CARE EDUCATION/TRAINING PROGRAM

## 2024-02-23 PROCEDURE — 84484 ASSAY OF TROPONIN QUANT: CPT | Mod: 91 | Performed by: NURSE PRACTITIONER

## 2024-02-23 PROCEDURE — 83880 ASSAY OF NATRIURETIC PEPTIDE: CPT | Performed by: NURSE PRACTITIONER

## 2024-02-23 PROCEDURE — 82728 ASSAY OF FERRITIN: CPT | Performed by: NURSE PRACTITIONER

## 2024-02-23 PROCEDURE — 85025 COMPLETE CBC W/AUTO DIFF WBC: CPT | Performed by: NURSE PRACTITIONER

## 2024-02-23 PROCEDURE — 99215 OFFICE O/P EST HI 40 MIN: CPT | Mod: 25,,, | Performed by: INTERNAL MEDICINE

## 2024-02-23 PROCEDURE — 94761 N-INVAS EAR/PLS OXIMETRY MLT: CPT

## 2024-02-23 PROCEDURE — 25000242 PHARM REV CODE 250 ALT 637 W/ HCPCS: Performed by: INTERNAL MEDICINE

## 2024-02-23 PROCEDURE — 36415 COLL VENOUS BLD VENIPUNCTURE: CPT | Mod: XB | Performed by: NURSE PRACTITIONER

## 2024-02-23 PROCEDURE — 83540 ASSAY OF IRON: CPT | Performed by: NURSE PRACTITIONER

## 2024-02-23 PROCEDURE — 99285 EMERGENCY DEPT VISIT HI MDM: CPT | Mod: 25

## 2024-02-23 PROCEDURE — 81000 URINALYSIS NONAUTO W/SCOPE: CPT | Performed by: NURSE PRACTITIONER

## 2024-02-23 RX ORDER — CARVEDILOL 6.25 MG/1
6.25 TABLET ORAL 2 TIMES DAILY
Status: DISCONTINUED | OUTPATIENT
Start: 2024-02-23 | End: 2024-02-24 | Stop reason: HOSPADM

## 2024-02-23 RX ORDER — INSULIN ASPART 100 [IU]/ML
0-10 INJECTION, SOLUTION INTRAVENOUS; SUBCUTANEOUS
Status: DISCONTINUED | OUTPATIENT
Start: 2024-02-23 | End: 2024-02-24 | Stop reason: HOSPADM

## 2024-02-23 RX ORDER — RANOLAZINE 500 MG/1
500 TABLET, EXTENDED RELEASE ORAL 2 TIMES DAILY
Status: DISCONTINUED | OUTPATIENT
Start: 2024-02-23 | End: 2024-02-24 | Stop reason: HOSPADM

## 2024-02-23 RX ORDER — ACETAMINOPHEN 325 MG/1
650 TABLET ORAL EVERY 4 HOURS PRN
Status: DISCONTINUED | OUTPATIENT
Start: 2024-02-23 | End: 2024-02-24 | Stop reason: HOSPADM

## 2024-02-23 RX ORDER — TALC
6 POWDER (GRAM) TOPICAL NIGHTLY PRN
Status: DISCONTINUED | OUTPATIENT
Start: 2024-02-23 | End: 2024-02-24 | Stop reason: HOSPADM

## 2024-02-23 RX ORDER — LABETALOL HYDROCHLORIDE 5 MG/ML
10 INJECTION, SOLUTION INTRAVENOUS
Status: DISCONTINUED | OUTPATIENT
Start: 2024-02-23 | End: 2024-02-24 | Stop reason: HOSPADM

## 2024-02-23 RX ORDER — NALOXONE HCL 0.4 MG/ML
0.02 VIAL (ML) INJECTION
Status: DISCONTINUED | OUTPATIENT
Start: 2024-02-23 | End: 2024-02-24 | Stop reason: HOSPADM

## 2024-02-23 RX ORDER — BISACODYL 10 MG/1
10 SUPPOSITORY RECTAL DAILY PRN
Status: DISCONTINUED | OUTPATIENT
Start: 2024-02-23 | End: 2024-02-24 | Stop reason: HOSPADM

## 2024-02-23 RX ORDER — MORPHINE SULFATE 4 MG/ML
2 INJECTION, SOLUTION INTRAMUSCULAR; INTRAVENOUS EVERY 4 HOURS PRN
Status: DISCONTINUED | OUTPATIENT
Start: 2024-02-23 | End: 2024-02-24 | Stop reason: HOSPADM

## 2024-02-23 RX ORDER — IBUPROFEN 200 MG
16 TABLET ORAL
Status: DISCONTINUED | OUTPATIENT
Start: 2024-02-23 | End: 2024-02-24 | Stop reason: HOSPADM

## 2024-02-23 RX ORDER — SODIUM CHLORIDE 0.9 % (FLUSH) 0.9 %
10 SYRINGE (ML) INJECTION EVERY 12 HOURS PRN
Status: DISCONTINUED | OUTPATIENT
Start: 2024-02-23 | End: 2024-02-24 | Stop reason: HOSPADM

## 2024-02-23 RX ORDER — HYDROCODONE BITARTRATE AND ACETAMINOPHEN 500; 5 MG/1; MG/1
TABLET ORAL
Status: DISCONTINUED | OUTPATIENT
Start: 2024-02-23 | End: 2024-02-24 | Stop reason: HOSPADM

## 2024-02-23 RX ORDER — FINASTERIDE 5 MG/1
5 TABLET, FILM COATED ORAL NIGHTLY
Status: DISCONTINUED | OUTPATIENT
Start: 2024-02-24 | End: 2024-02-24 | Stop reason: HOSPADM

## 2024-02-23 RX ORDER — LEVOTHYROXINE SODIUM 150 UG/1
300 TABLET ORAL EVERY MORNING
Status: DISCONTINUED | OUTPATIENT
Start: 2024-02-23 | End: 2024-02-24 | Stop reason: HOSPADM

## 2024-02-23 RX ORDER — IBUPROFEN 200 MG
24 TABLET ORAL
Status: DISCONTINUED | OUTPATIENT
Start: 2024-02-23 | End: 2024-02-24 | Stop reason: HOSPADM

## 2024-02-23 RX ORDER — ALUMINUM HYDROXIDE, MAGNESIUM HYDROXIDE, AND SIMETHICONE 1200; 120; 1200 MG/30ML; MG/30ML; MG/30ML
30 SUSPENSION ORAL 4 TIMES DAILY PRN
Status: DISCONTINUED | OUTPATIENT
Start: 2024-02-23 | End: 2024-02-24 | Stop reason: HOSPADM

## 2024-02-23 RX ORDER — ARFORMOTEROL TARTRATE 15 UG/2ML
15 SOLUTION RESPIRATORY (INHALATION) 2 TIMES DAILY
Status: DISCONTINUED | OUTPATIENT
Start: 2024-02-23 | End: 2024-02-24 | Stop reason: HOSPADM

## 2024-02-23 RX ORDER — NITROGLYCERIN 0.4 MG/1
0.4 TABLET SUBLINGUAL EVERY 5 MIN PRN
Status: DISCONTINUED | OUTPATIENT
Start: 2024-02-23 | End: 2024-02-24 | Stop reason: HOSPADM

## 2024-02-23 RX ORDER — ONDANSETRON HYDROCHLORIDE 2 MG/ML
4 INJECTION, SOLUTION INTRAVENOUS EVERY 8 HOURS PRN
Status: DISCONTINUED | OUTPATIENT
Start: 2024-02-23 | End: 2024-02-24 | Stop reason: HOSPADM

## 2024-02-23 RX ORDER — SODIUM CHLORIDE 0.9 % (FLUSH) 0.9 %
10 SYRINGE (ML) INJECTION
Status: DISCONTINUED | OUTPATIENT
Start: 2024-02-23 | End: 2024-02-24 | Stop reason: HOSPADM

## 2024-02-23 RX ORDER — BUDESONIDE 0.5 MG/2ML
0.5 INHALANT ORAL EVERY 12 HOURS
Status: DISCONTINUED | OUTPATIENT
Start: 2024-02-23 | End: 2024-02-24 | Stop reason: HOSPADM

## 2024-02-23 RX ORDER — GLUCAGON 1 MG
1 KIT INJECTION
Status: DISCONTINUED | OUTPATIENT
Start: 2024-02-23 | End: 2024-02-24 | Stop reason: HOSPADM

## 2024-02-23 RX ORDER — BISACODYL 10 MG/1
10 SUPPOSITORY RECTAL DAILY PRN
Status: DISCONTINUED | OUTPATIENT
Start: 2024-02-23 | End: 2024-02-23 | Stop reason: SDUPTHER

## 2024-02-23 RX ORDER — BUDESONIDE AND FORMOTEROL FUMARATE DIHYDRATE 160; 4.5 UG/1; UG/1
2 AEROSOL RESPIRATORY (INHALATION) EVERY 12 HOURS
Status: DISCONTINUED | OUTPATIENT
Start: 2024-02-23 | End: 2024-02-23 | Stop reason: CLARIF

## 2024-02-23 RX ORDER — CARVEDILOL 3.12 MG/1
3.12 TABLET ORAL 2 TIMES DAILY WITH MEALS
Status: DISCONTINUED | OUTPATIENT
Start: 2024-02-23 | End: 2024-02-23

## 2024-02-23 RX ADMIN — CARVEDILOL 6.25 MG: 6.25 TABLET, FILM COATED ORAL at 09:02

## 2024-02-23 RX ADMIN — BUDESONIDE 0.5 MG: 0.5 INHALANT RESPIRATORY (INHALATION) at 07:02

## 2024-02-23 RX ADMIN — RANOLAZINE 500 MG: 500 TABLET, EXTENDED RELEASE ORAL at 04:02

## 2024-02-23 RX ADMIN — ARFORMOTEROL TARTRATE 15 MCG: 15 SOLUTION RESPIRATORY (INHALATION) at 07:02

## 2024-02-23 NOTE — ASSESSMENT & PLAN NOTE
Reported intermittent CP at home, unable to be treated with AC at this time due to GI bleed. Evaluated by cardiology, not a candidate for heart cath at this time due to anemia and inability to treat with DAPT. Manage medically.    --NTG SL PRN  --Tele  --Repeat EKG in AM  --Hgb goal >9.0

## 2024-02-23 NOTE — ASSESSMENT & PLAN NOTE
Patient's FSGs are controlled on current medication regimen.  Last A1c reviewed-   Lab Results   Component Value Date    HGBA1C 6.3 (H) 10/09/2023     Most recent fingerstick glucose reviewed-   Recent Labs   Lab 02/23/24  1254   POCTGLUCOSE 99     Current correctional scale  Medium  Maintain anti-hyperglycemic dose as follows-   Antihyperglycemics (From admission, onward)      Start     Stop Route Frequency Ordered    02/23/24 1508  insulin aspart U-100 pen 0-10 Units         -- SubQ Before meals & nightly PRN 02/23/24 1409          Hold Oral hypoglycemics while patient is in the hospital.

## 2024-02-23 NOTE — PHARMACY MED REC
"Admission Medication History     The home medication history was taken by Rosie Howell.    You may go to "Admission" then "Reconcile Home Medications" tabs to review and/or act upon these items.     The home medication list has been updated by the Pharmacy department.   Please read ALL comments highlighted in yellow.   Please address this information as you see fit.    Feel free to contact us if you have any questions or require assistance.      The medications listed below were removed from the home medication list. Please reorder if appropriate:  Patient reports no longer taking the following medication(s):  Iprat nasal spray        Medications listed below were obtained from: Patient/family and Analytic software- [employer]  (Not in a hospital admission)      LAST MED REC COMPLETED:         Rosie Howell  [id number]    Current Outpatient Medications on File Prior to Encounter   Medication Sig Dispense Refill Last Dose    albuterol (VENTOLIN HFA) 90 mcg/actuation inhaler Inhale 2 puffs into the lungs every 6 (six) hours as needed for Wheezing or Shortness of Breath. Rescue 18 g 3 2/23/2024    amLODIPine (NORVASC) 10 MG tablet TAKE 1 TABLET BY MOUTH ONCE A DAY (DOSE INCREASE) (Patient taking differently: Take by mouth every morning.) 30 tablet 11 2/23/2024    budesonide-formoterol 160-4.5 mcg (SYMBICORT) 160-4.5 mcg/actuation HFAA INHALE 2 PUFFS INTO THE LUNGS TWO TIMES A DAY. 10.2 g 11 2/23/2024    carvediloL (COREG) 3.125 MG tablet TAKE 1 TABLET BY MOUTH TWICE DAILY WITH FOOD 60 tablet 5 2/23/2024    empagliflozin (JARDIANCE) 25 mg tablet Take 1 tablet (25 mg total) by mouth once daily. (Patient taking differently: Take 25 mg by mouth every morning.) 90 tablet 1 2/23/2024    finasteride (PROSCAR) 5 mg tablet TAKE 1 TABLET BY MOUTH once daily 90 tablet 2 2/23/2024    furosemide (LASIX) 40 MG tablet Take 1 tablet (40 mg total) by mouth 2 (two) times daily. 60 tablet 11 2/23/2024    GLUCOSAMINE HCL/CHONDR ROSARIO A " "NA (OSTEO BI-FLEX ORAL) Take 1 tablet by mouth 2 (two) times daily.    2024    levocetirizine (XYZAL) 5 MG tablet Take 1 tablet (5 mg total) by mouth every evening. 30 tablet 11 2024    levothyroxine (SYNTHROID) 300 MCG Tab TAKE 1 TABLET BY MOUTH EVERY MORNING 90 tablet 3 2024    lisinopriL 10 MG tablet TAKE 1 BY MOUTH EVERY DAY (Patient taking differently: Take 10 mg by mouth every morning.) 30 tablet 7 2024    metFORMIN (GLUCOPHAGE-XR) 500 MG ER 24hr tablet Take 1 tablet (500 mg total) by mouth 2 (two) times daily with meals. 180 tablet 3 2024    montelukast (SINGULAIR) 10 mg tablet Take 1 tablet (10 mg total) by mouth once daily. 90 tablet 3 2024    propafenone (RHTHYMOL) 150 MG Tab Take 1 tablet (150 mg total) by mouth every 8 (eight) hours. 90 tablet 11 2024    RABEprazole (ACIPHEX) 20 mg tablet Take 1 tablet (20 mg total) by mouth 2 (two) times daily. 180 tablet 3 2024    ACCU-CHEK GRACE PLUS METER Misc AS DIRECTED 1 each 0     blood sugar diagnostic (ACCU-CHEK GRACE PLUS TEST STRP) Strp TEST THREE TIMES A  strip 11     insulin (LANTUS SOLOSTAR U-100 INSULIN) glargine 100 units/mL SubQ pen Inject 44 Units into the skin every evening. 45 mL 1     lancets (ACCU-CHEK FASTCLIX LANCET DRUM) Misc TEST TWO TIMES A  each 5     pen needle, diabetic (BD ULTRA-FINE MINI PEN NEEDLE) 31 gauge x 3/16" Ndle USE AS DIRECTED 100 each 11     REPATHA SURECLICK 140 mg/mL PnIj SMARTSI Milligram(s) Topical Every 2 Weeks       sildenafiL (VIAGRA) 100 MG tablet Take 1/2 to 1 tablet by mouth one hour prior to intercourse. Max 100mg per day 30 tablet 11                            .          "

## 2024-02-23 NOTE — ASSESSMENT & PLAN NOTE
Monitor on telemetry.  Xarelto recently stopped due to severe anemia.    Will need EP consultation in future -- can consider LAAO device option in future.   Improved.

## 2024-02-23 NOTE — SUBJECTIVE & OBJECTIVE
Past Medical History:   Diagnosis Date    Anticoagulant long-term use     Aortic stenosis     Asthma     Benign prostatic hyperplasia with nocturia     Bilateral carotid artery stenosis 07/21/2021    US CAROTID BILATERAL 07/14/2021 IMPRESSION: Atherosclerosis with suspected stenosis greater than 50% at the right proximal ICA visually. Velocities are discordant and appear improved from prior. Atherosclerosis on the left with no evidence of flow-limiting stenosis greater than 50%.  FINDINGS: Right: Internal Carotid Artery (ICA): Peak systolic velocity 118 cm/sec. 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 09/22/2020    DELONTE on CPAP     Osteoarthritis     Paroxysmal atrial fibrillation     PVD (peripheral vascular disease)     Secondary esophageal varices without bleeding 06/26/2017    Type 2 diabetes mellitus        Past Surgical History:   Procedure Laterality Date    ANGIOGRAM, EXTREMITY, UNILATERAL Left 1/4/2024    Procedure: ANGIOGRAM, EXTREMITY, UNILATERAL- Femoral / SMS Stent, Poss General;  Surgeon: ALEX Alfred III, MD;  Location: [facility] OR [address] [place] [address];  Service: Vascular;  Laterality: Left;  mGy : 2698.78  Gy.cm : 229.88  Contrast : 62ml  Fluro time : 13.8min    CARDIAC CATHETERIZATION      CARDIAC SURGERY      CARPAL TUNNEL RELEASE Left     CATARACT EXTRACTION      CATARACT EXTRACTION W/ INTRAOCULAR LENS  IMPLANT, BILATERAL  2008    CATHETERIZATION OF BOTH LEFT AND RIGHT HEART N/A 11/2/2018    Procedure: CATHETERIZATION, HEART, BOTH  LEFT AND RIGHT;  Surgeon: Frank Gallardo MD;  Location: [facility] CATH LAB;  Service: Cardiology;  Laterality: N/A;    COLONOSCOPY  8/2013    COLONOSCOPY N/A 5/30/2016    Procedure: COLONOSCOPY;  Surgeon: Anna Tomas MD;  Location: [facility] ENDO;  Service: Endoscopy;  Laterality: N/A;    COLONOSCOPY N/A 7/17/2019    Procedure: COLONOSCOPY;  Surgeon: David Carter III, MD;  Location: [facility] ENDO;  Service: Endoscopy;  Laterality: N/A;    COLONOSCOPY N/A 12/14/2023    Procedure: COLONOSCOPY;  Surgeon: Ama Barrera MD;  Location: [facility] ENDO;  Service: Endoscopy;  Laterality: N/A;    COMPUTED TOMOGRAPHY N/A 9/9/2019    Procedure: CT (COMPUTED TOMOGRAPHY);  Surgeon: [facility];  Location: [facility];  Service: General;  Laterality: N/A;  Microwave ablation to be done in CT.  Need CRNA and cart    COMPUTED TOMOGRAPHY N/A 1/25/2022    Procedure: Liver Microwave Ablation;  Surgeon: Red Puentes MD;  Location: [facility];  Service: General;  Laterality: N/A;    CORONARY ARTERY BYPASS GRAFT (CABG) N/A 11/5/2018    Procedure: CORONARY ARTERY BYPASS GRAFT (CABG);  Surgeon: Bear De Luna MD;  Location: [facility];  Service: Cardiothoracic;  Laterality: N/A;  TWO VESSEL BYPASS WITH AORTOTOMY AND EXCISION OF ATHEROSCLEROTIC PLAQUE    CORONARY BYPASS GRAFT ANGIOGRAPHY  3/2/2020    Procedure: Bypass graft study;  Surgeon: Cora Cormier MD;  Location: [facility] CATH LAB;  Service: Cardiology;;    ELBOW BURSA SURGERY Right 2010    ENDOSCOPIC HARVEST OF VEIN Left 11/5/2018    Procedure: SURGICAL PROCUREMENT, VEIN, ENDOSCOPIC;  Surgeon: Bear De Luna MD;  Location: [facility];  Service: Cardiothoracic;  Laterality: Left;    ESOPHAGOGASTRODUODENOSCOPY N/A 7/17/2019    Procedure: ESOPHAGOGASTRODUODENOSCOPY (EGD);  Surgeon: David Carter III, MD;  Location: [facility];  Service: Endoscopy;  Laterality: N/A;    ESOPHAGOGASTRODUODENOSCOPY N/A 12/29/2022    Procedure: ESOPHAGOGASTRODUODENOSCOPY (EGD);  Surgeon: Issa CARLSON  MD Irwin;  Location: [facility] ENDO;  Service: Endoscopy;  Laterality: N/A;    ESOPHAGOGASTRODUODENOSCOPY N/A 1/17/2024    Procedure: EGD (ESOPHAGOGASTRODUODENOSCOPY);  Surgeon: Issa Gayle MD;  Location: [facility] ENDO;  Service: Endoscopy;  Laterality: N/A;    ETHMOIDECTOMY Bilateral 3/27/2019    Procedure: ETHMOIDECTOMY;  Surgeon: Alejandro Parkinson MD;  Location: [facility] OR;  Service: ENT;  Laterality: Bilateral;    EYE SURGERY      FOOT SURGERY      FRONTAL SINUS OBLITERATION Bilateral 3/27/2019    Procedure: SINUSOTOMY, FRONTAL SINUS, OBLITERATIVE;  Surgeon: Alejandro Parkinson MD;  Location: [facility] OR;  Service: ENT;  Laterality: Bilateral;    FUNCTIONAL ENDOSCOPIC SINUS SURGERY (FESS) Bilateral 3/27/2019    Procedure: FESS (FUNCTIONAL ENDOSCOPIC SINUS SURGERY);  Surgeon: Alejandro Parkinson MD;  Location: [facility] OR;  Service: ENT;  Laterality: Bilateral;  Left Keila bullosa resection     INTRALUMINAL GASTROINTESTINAL TRACT IMAGING VIA CAPSULE N/A 2/2/2024    Procedure: IMAGING PROCEDURE, GI TRACT, INTRALUMINAL, VIA CAPSULE;  Surgeon: Cooper Estrella RN;  Location: [facility];  Service: Endoscopy;  Laterality: N/A;    KNEE ARTHROSCOPY W/ MENISCAL REPAIR Left 06/2019    dr boykin    KNEE SURGERY Right     LEFT HEART CATHETERIZATION Left 3/2/2020    Procedure: CATHETERIZATION, HEART, LEFT;  Surgeon: Cora Cormier MD;  Location: [facility] CATH LAB;  Service: Cardiology;  Laterality: Left;    LIVER BIOPSY  01/2022    MAXILLARY ANTROSTOMY Bilateral 3/27/2019    Procedure: MAXILLARY ANTROSTOMY;  Surgeon: Alejandro Pariknson MD;  Location: [facility] OR;  Service: ENT;  Laterality: Bilateral;    NASAL SEPTOPLASTY N/A 3/27/2019    Procedure: SEPTOPLASTY, NOSE;  Surgeon: Alejandro Parkinson MD;  Location: [facility] OR;  Service: ENT;  Laterality: N/A;    RECTAL SURGERY  2012    STENT, SUPERIOR MESENTERIC ARTERY Left 1/4/2024    Procedure: STENT, SUPERIOR MESENTERIC ARTERY;  Surgeon: ALEX Alfred III, MD;  Location: [address];  Service: Vascular;  Laterality: Left;     TRANSURETHRAL RESECTION OF PROSTATE (TURP) WITHOUT USE OF LASER N/A 6/19/2018    Procedure: TURP, WITHOUT USING LASER;  Surgeon: Cooper Cordova IV, MD;  Location: [facility];  Service: Urology;  Laterality: N/A;    TRIGGER FINGER RELEASE Left        Review of patient's allergies indicates:   Allergen Reactions    Lipitor [atorvastatin] Other (See Comments)     Muscle aches and pains as well as fatigue.     Niacin preparations Other (See Comments)     Causes broken blood vessels and bruises at 4 times normal dose.    Statins-hmg-coa reductase inhibitors Other (See Comments)     Statins cause intolerable myalgias.    Iodinated contrast media Hives     Tachycardia    Omeprazole Hives and Itching    Prilosec [omeprazole magnesium] Hives and Itching       Current Facility-Administered Medications on File Prior to Encounter   Medication    lactated ringers infusion     Current Outpatient Medications on File Prior to Encounter   Medication Sig    ACCU-CHEK GRACE PLUS METER Misc AS DIRECTED    albuterol (VENTOLIN HFA) 90 mcg/actuation inhaler Inhale 2 puffs into the lungs every 6 (six) hours as needed for Wheezing or Shortness of Breath. Rescue    amLODIPine (NORVASC) 10 MG tablet TAKE 1 TABLET BY MOUTH ONCE A DAY (DOSE INCREASE) (Patient taking differently: Take by mouth every morning.)    blood sugar diagnostic (ACCU-CHEK GRACE PLUS TEST STRP) Strp TEST THREE TIMES A DAY    budesonide-formoterol 160-4.5 mcg (SYMBICORT) 160-4.5 mcg/actuation HFAA INHALE 2 PUFFS INTO THE LUNGS TWO TIMES A DAY.    carvediloL (COREG) 3.125 MG tablet TAKE 1 TABLET BY MOUTH TWICE DAILY WITH FOOD    empagliflozin (JARDIANCE) 25 mg tablet Take 1 tablet (25 mg total) by mouth once daily. (Patient taking differently: Take 25 mg by mouth every morning.)    finasteride (PROSCAR) 5 mg tablet TAKE 1 TABLET BY MOUTH once daily    furosemide (LASIX) 40 MG tablet Take 1 tablet (40 mg total) by mouth 2 (two) times daily.    GLUCOSAMINE HCL/CHONDR ROSARIO A NA  "(OSTEO BI-FLEX ORAL) Take 1 tablet by mouth 2 (two) times daily.     insulin (LANTUS SOLOSTAR U-100 INSULIN) glargine 100 units/mL SubQ pen Inject 44 Units into the skin every evening.    ipratropium (ATROVENT) 21 mcg (0.03 %) nasal spray 2 sprays by Each Nostril route 2 (two) times daily.    lancets (ACCU-CHEK FASTCLIX LANCET DRUM) Misc TEST TWO TIMES A DAY    levocetirizine (XYZAL) 5 MG tablet Take 1 tablet (5 mg total) by mouth every evening.    levothyroxine (SYNTHROID) 300 MCG Tab TAKE 1 TABLET BY MOUTH EVERY MORNING    lisinopriL 10 MG tablet TAKE 1 BY MOUTH EVERY DAY (Patient taking differently: Take 10 mg by mouth every morning.)    metFORMIN (GLUCOPHAGE-XR) 500 MG ER 24hr tablet Take 1 tablet (500 mg total) by mouth 2 (two) times daily with meals.    montelukast (SINGULAIR) 10 mg tablet Take 1 tablet (10 mg total) by mouth once daily.    pen needle, diabetic (BD ULTRA-FINE MINI PEN NEEDLE) 31 gauge x 3/16" Ndle USE AS DIRECTED    propafenone (RHTHYMOL) 150 MG Tab Take 1 tablet (150 mg total) by mouth every 8 (eight) hours.    RABEprazole (ACIPHEX) 20 mg tablet Take 1 tablet (20 mg total) by mouth 2 (two) times daily.    REPATHA SURECLICK 140 mg/mL PnIj SMARTSI Milligram(s) Topical Every 2 Weeks    sildenafiL (VIAGRA) 100 MG tablet Take 1/2 to 1 tablet by mouth one hour prior to intercourse. Max 100mg per day     Family History       Problem Relation (Age of Onset)    Heart disease Mother, Father, Brother          Tobacco Use    Smoking status: Former     Current packs/day: 0.00     Average packs/day: 0.5 packs/day for 4.0 years (2.0 ttl pk-yrs)     Types: Cigarettes     Start date: 1968     Quit date: 1972     Years since quittin.7    Smokeless tobacco: Former     Types: Chew     Quit date: 1976   Substance and Sexual Activity    Alcohol use: Yes     Alcohol/week: 2.0 standard drinks of alcohol     Types: 2 Cans of beer per week    Drug use: No    Sexual activity: Yes     Partners: " Female     Review of Systems   Respiratory:  Positive for chest tightness and shortness of breath.    Neurological:  Positive for numbness.   All other systems reviewed and are negative.    Objective:     Vital Signs (Most Recent):  Temp: 98.8 °F (37.1 °C) (02/23/24 1039)  Pulse: 78 (02/23/24 1402)  Resp: 18 (02/23/24 1250)  BP: (!) 162/76 (02/23/24 1402)  SpO2: 99 % (02/23/24 1402) Vital Signs (24h Range):  Temp:  [98.8 °F (37.1 °C)] 98.8 °F (37.1 °C)  Pulse:  [78-88] 78  Resp:  [17-18] 18  SpO2:  [99 %-100 %] 99 %  BP: (162-184)/(76-91) 162/76     Weight: 102.7 kg (226 lb 6.6 oz)  Body mass index is 35.46 kg/m².     Physical Exam  Vitals and nursing note reviewed.   Constitutional:       General: He is not in acute distress.     Appearance: He is well-developed. He is not diaphoretic.   HENT:      Head: Normocephalic and atraumatic.      Right Ear: Hearing and external ear normal.      Left Ear: Hearing and external ear normal.      Nose: Nose normal. No mucosal edema or rhinorrhea.      Mouth/Throat:      Pharynx: Uvula midline.   Eyes:      General:         Right eye: No discharge.         Left eye: No discharge.      Conjunctiva/sclera: Conjunctivae normal.      Right eye: No chemosis.     Left eye: No chemosis.     Pupils: Pupils are equal, round, and reactive to light.   Neck:      Thyroid: No thyroid mass or thyromegaly.      Trachea: Trachea normal.   Cardiovascular:      Rate and Rhythm: Normal rate and regular rhythm.      Pulses:           Dorsalis pedis pulses are 2+ on the right side and 2+ on the left side.      Heart sounds: Normal heart sounds. No murmur heard.  Pulmonary:      Effort: Pulmonary effort is normal. No respiratory distress.      Breath sounds: Normal breath sounds. No decreased breath sounds or wheezing.   Abdominal:      General: Bowel sounds are normal. There is no distension.      Palpations: Abdomen is soft.      Tenderness: There is no abdominal tenderness.   Musculoskeletal:          General: Normal range of motion.      Cervical back: Normal range of motion and neck supple.   Lymphadenopathy:      Cervical: No cervical adenopathy.      Upper Body:      Right upper body: No supraclavicular adenopathy.      Left upper body: No supraclavicular adenopathy.   Skin:     General: Skin is warm and dry.      Capillary Refill: Capillary refill takes less than 2 seconds.      Findings: No rash.   Neurological:      Mental Status: He is alert and oriented to person, place, and time.   Psychiatric:         Mood and Affect: Mood is not anxious.         Speech: Speech normal.         Behavior: Behavior normal.         Thought Content: Thought content normal.         Judgment: Judgment normal.              CRANIAL NERVES     CN III, IV, VI   Pupils are equal, round, and reactive to light.       Significant Labs: All pertinent labs within the past 24 hours have been reviewed.  CBC:   Recent Labs   Lab 02/23/24  1223   WBC 4.40   HGB 8.2*   HCT 27.2*        CMP:   Recent Labs   Lab 02/23/24  1223      K 4.0      CO2 25      BUN 15   CREATININE 1.0   CALCIUM 9.2   PROT 7.3   ALBUMIN 3.7   BILITOT 0.4   ALKPHOS 76   AST 19   ALT 15   ANIONGAP 11     Cardiac Markers:   Recent Labs   Lab 02/23/24  1223   *     Troponin:   Recent Labs   Lab 02/23/24  1223   TROPONINI 0.006       Significant Imaging: I have reviewed all pertinent imaging results/findings within the past 24 hours.

## 2024-02-23 NOTE — PT/OT/SLP EVAL
Speech Language Pathology Evaluation  Cognitive/Bedside Swallow    Patient Name:  Erendira Pretty  MRN:   [id number]   Admitting Diagnosis: Chest Pain    Recommendations:                  General Recommendations:  No further acute SLP intervention indicated at this time. MD to re-consult as needed.  Diet recommendations:  Regular Diet - IDDSI Level 7, Thin liquids - IDDSI Level 0   Aspiration Precautions: 1 bite/sip at a time, HOB to 90 degrees, Remain upright 30 minutes post meal, and Standard aspiration precautions   General Precautions: Standard, aspiration  Communication strategies:  none    History:     Social History: Patient lives in [place], [place].    Prior Intubation HX:  NA    Modified Barium Swallow: NA    XR CHEST AP PORTABLE 02/23/2024:     FINDINGS:  Single view of the chest.  Comparison 5/8/23     Cardiac silhouette is normal.  The lungs demonstrate no evidence of active disease.  No evidence of pleural effusion or pneumothorax.  Bones appear intact.  Moderate degenerative changes and moderate atherosclerotic disease.     Impression:  No acute process seen.     Electronically signed by: Red Puentes MD  Date:                                            02/23/2024    CT HEAD WITHOUT CONTRAST on 02/23/2024:    FINDINGS:  Intracranial compartment:  The brain parenchyma demonstrates areas of decreased attenuation with mild to moderate periventricular white matter consistent with chronic microvascular ischemic changes..  No parenchymal mass, hemorrhage, edema or major vascular distribution infarct.  Vascular calcifications are noted.  Mild prominence of the sulci and ventricles are consistent with age-related involutional changes.  No extra-axial blood or fluid collections.  Skull/extracranial contents (limited evaluation): No fracture.  Debris and suspected fluid level within the left sphenoid sinus and right frontal sinus which could reflect sinusitis.  Patchy ethmoid opacification noted.  Mastoid air cells  and paranasal sinuses are essentially clear.     Impression:  Chronic microvascular ischemic changes.  No intracranial hemorrhage.  [place] stroke programme early CT score (ASPECTS): 10  Debris and suspected fluid level within the left sphenoid sinus and right frontal sinus which could reflect sinusitis.     Electronically signed by: Red Puentes MD    Prior diet: Regular diet    Subjective     Pt seen bedside for ST evaluation.  Patient goals: To return home and to eat     Pain/Comfort:  Pain Rating 1: 0/10  Pain Rating Post-Intervention 1: 0/10  Pain Rating 2: 0/10  Pain Rating Post-Intervention 2: 0/10    Respiratory Status: Room air    Objective:     Cognitive Status:    Pt oriented with functional memory recall and reasoning.     Receptive Language:   Comprehension:   WFL during conversation    Pragmatics:    WFL    Expressive Language:  Verbal:    WFL during conversation    Motor Speech:  WFL    Voice:   WFL    Oral Musculature Evaluation  Oral Musculature: WFL  Dentition:   Secretion Management: adequate  Mucosal Quality: good  Mandibular Strength and Mobility: WFL  Oral Labial Strength and Mobility: WFL  Lingual Strength and Mobility: WFL  Velar Elevation: WFL  Buccal Strength and Mobility: WFL  Volitional Cough: present, productive  Volitional Swallow: present  Voice Prior to PO Intake: Adequate vocal function    Bedside Clinical Swallow Evaluation:     [place] Swallow Protocol:  [place] Swallow Protocol dictates patient remain NPO if fail screener (Radhar et al. 2014).  The [place] Swallow Protocol was administered. The patient was alert and provided the instructions prior to the beginning of the protocol. The patient consumed 3 oz before putting the cup down. Patient drank with consecutive swallows. Pt without overt s/s of aspiration.    Clinical Swallow Examination:   Of note, patient self-fed throughout evaluation. Patient presented with:     CONSISTENCY  NOTES   THIN (IDDSI 0) 3 oz water challenge    No overt  s/s of dysphagia   PUREE (IDDSI 4/Extremely Thick)   TSP/TBSP bites of pudding No overt s/s of dysphagia   SOLID (IDDSI 7/Regular) Bite of Tyron Crackers   No overt s/s of dysphagia     Thickened liquids were not used in this assessment. Lazaro (2018) reported that thickened liquids have no sound evidence at reducing the risk of pneumonia in patients with dysphagia and can cause harm by increasing their risk of dehydration. It also presents an increased risk of UTI, electrolyte imbalance, constipation, fecal impaction, cognitive impairment, functional decline and even death (Langmore, 2002; Tasha, 2016).  Thickened liquids are associated with risks including dehydration, increased pharyngeal residue, potential interference with medication absorption, and decreased quality of life (Corine, 2013). Thickened liquids are also more likely to be silently aspirated than thin liquids (Ian et al., 2018). This supports the assertion that we should confirm a patient requires thickened liquids with an instrumental swallow study prior to recommending them.    References:   Corine ELAM (2013). Thickening agents used for dysphagia management: Effect on bioavailability of water, medication and feelings of satiety. Nutrition Journal, 12, 54. https://doi.org/10.1186/6961-2880-78-54    AMY Sosa., RADHA Stallings., SURINDER Abad, & AMY Huertas. (2018). Cough response to aspiration in thin and thick fluids during FEES in hospitalized inpatients. International journal of language & communication disorders, 53(5), 909-918. https://doi.org/10.1111/3585-1538.99176    INTERPRETATION AND RISK ASSESSMENT:  Clinical swallow evaluation (CSE) revealed oral phase characterized by lingual, labial, and buccal strength and range of motion functional for lip closure, bolus preparation and propulsion. The patient had no anterior loss of the bolus with complete closure of the lips around the utensils. No residue remained in the oral cavity following  the swallow. Patient without overt clinical signs/symptoms of aspiration on any PO trials given.    Compensatory Strategies  Standard aspiration precautions      Assessment:     Erendira Pretty is a 78 y.o. male/female admitted to [facility] acute with dx Chest Pain. He presents with functional OM and cognitive-linguistic ability to meet functional communicative needs.  No overt s/s of dysphagia present during bedside CSE, and pt recommended for IDDSI 7-regular solids with IDDSI 0-thin liquids, following standard aspiration precautions.  No further acute SLP intervention indicated at this time. MD to re-consult if needed.    Goals: N/A. D/C ST  Multidisciplinary Problems       SLP Goals       Not on file                    Plan:     Plan of Care reviewed with:  patient   SLP Follow-Up:  No       Discharge recommendations:  Discharge Facility/Level of Care Needs:   Barriers to Discharge:  None    Time Tracking:     SLP Treatment Date:    Speech Start Time: 16:00   Speech Stop Time:  16:30  Speech Total Time (min):  30    Billable Minutes: Eval 15  and Eval Swallow and Oral Function 15

## 2024-02-23 NOTE — SUBJECTIVE & OBJECTIVE
HPI:  78 y.o. male  with a PMHx significant for paroxysmal AFib on xarelto, DM, HTN, CAD, history of hepatocellular carcinoma status post ablation presenting with transient left leg numbness.     Off of OAC due to recent GIB for the last 2 weeks. He reports that his left leg felt like it went to sleep. Now resolved. Initially presented to the ED with SOB, chest pressure.       Images personally reviewed and interpreted:  Study: Head CT  Study Interpretation: No acute intracranial hemorrhage or large territory infarction.      Assessment and plan:  # Episode of transient neurologic symptoms - overall concern for TIA given his vascular risk factors.     Lytics recommendation: Thrombolytic therapy not recommended due to Patient back to neurological baseline  Thrombectomy recommendation: Awaiting CTA results from [facility] for determination   Placement recommendation: pending further studies     - Recommend resuming antiplatelet/anticoagulation medications as soon as safe from a GI standpoint.   - Recommend an expedited CTA head/neck to evaluate for potential symptomatic stenosis/occlusive lesion that might significantly increase risk of recurrent or worsening signs/symptoms.   - Recommend follow-up non-urgent MRI brain without contrast to evaluate for acute ischemia.  - If above studies are abnormal, please load images to teleneurology imaging system and contact us to review.    Please contact us with any further questions or concerns or if patient has any acute neurological changes (new symptoms, worsening deficits).

## 2024-02-23 NOTE — CONSULTS
[facility] - Emergency Dept.  Cardiology  Consult Note    Patient Name: Erendira Pretty  MRN: [id number]  Admission Date: 2/23/2024  Hospital Length of Stay: 0 days  Code Status: Full Code   Attending Provider: Magalis Machado MD   Consulting Provider: Jono Obregon MD  Primary Care Physician: Bhupinder Callaway MD  Principal Problem:Chest pain    Patient information was obtained from patient, past medical records, and ER records.     Inpatient consult to Cardiology  Consult performed by: Jono Obregon MD  Consult ordered by: Radha King NP  Reason for consult: CHEST PAIN        Subjective:     Chief Complaint:  CHEST PAIN    HPI:   Cardiology consulted for CP.  Pt f/u by Chikis Remy.  Has h/o 2v CABG, PAF, esophageal varices, PVD, AS, AI, carotid disease, DELONTE, SMA stent.  Nonsmoker.  Used to drink a lot.  Recent events pertinent for severe anemia, s/p PRBC.  Was on Xarelto for a fib, asa/plavix too and was taken off anticoagulation just recently for anemia issues.  S/p GI workup--- varices found on egd.  Today reports exertional angina over last few days, also had L LE numbness in ER prompting possible CVA workup with head CT/MRI ordered.  Ecg on admit most likely sinus arrhythmia, 1 av block no ischemia noted.  Ecgs in past 6 months showed a fib.  Hg 8.2    - troponin.  Negative nuclear stress Aug 2023.  Echo Aug 2023 normal EF, mod AS, mild-mod AI, mild MR.        Past Medical History:   Diagnosis Date    Anticoagulant long-term use     Aortic stenosis     Asthma     Benign prostatic hyperplasia with nocturia     Bilateral carotid artery stenosis 07/21/2021    US CAROTID BILATERAL 07/14/2021 IMPRESSION: Atherosclerosis with suspected stenosis greater than 50% at the right proximal ICA visually. Velocities are discordant and appear improved from prior. Atherosclerosis on the left with no evidence of flow-limiting stenosis greater than 50%.  FINDINGS: Right: Internal Carotid Artery (ICA):  Peak systolic velocity 118 cm/sec. 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 09/22/2020    DELONTE on CPAP     Osteoarthritis     Paroxysmal atrial fibrillation     PVD (peripheral vascular disease)     Secondary esophageal varices without bleeding 06/26/2017    Type 2 diabetes mellitus        Past Surgical History:   Procedure Laterality Date    ANGIOGRAM, EXTREMITY, UNILATERAL Left 1/4/2024    Procedure: ANGIOGRAM, EXTREMITY, UNILATERAL- Femoral / SMS Stent, Poss General;  Surgeon: ALEX Alfred III, MD;  Location: [facility] OR [address] [place] [address];  Service: Vascular;  Laterality: Left;  mGy : 2698.78  Gy.cm : 229.88  Contrast : 62ml  Fluro time : 13.8min    CARDIAC CATHETERIZATION      CARDIAC SURGERY      CARPAL TUNNEL RELEASE Left     CATARACT EXTRACTION      CATARACT EXTRACTION W/ INTRAOCULAR LENS  IMPLANT, BILATERAL  2008    CATHETERIZATION OF BOTH LEFT AND RIGHT HEART N/A 11/2/2018    Procedure: CATHETERIZATION, HEART, BOTH LEFT AND RIGHT;  Surgeon: Frank Lazo MD;  Location: [facility] CATH LAB;  Service: Cardiology;  Laterality: N/A;    COLONOSCOPY  8/2013    COLONOSCOPY N/A 5/30/2016    Procedure: COLONOSCOPY;  Surgeon: Anna Tomas MD;  Location: [facility] ENDO;  Service: Endoscopy;  Laterality: N/A;    COLONOSCOPY N/A 7/17/2019    Procedure: COLONOSCOPY;  Surgeon: David Carter III, MD;  Location: [facility] ENDO;  Service: Endoscopy;  Laterality: N/A;    COLONOSCOPY N/A 12/14/2023    Procedure: COLONOSCOPY;  Surgeon: Ama Barrera MD;   Location: [facility] [place] ENDO;  Service: Endoscopy;  Laterality: N/A;    COMPUTED TOMOGRAPHY N/A 9/9/2019    Procedure: CT (COMPUTED TOMOGRAPHY);  Surgeon: Abrahan Diagnostic Provider;  Location: [facility] [place] OR;  Service: General;  Laterality: N/A;  Microwave ablation to be done in CT.  Need CRNA and cart    COMPUTED TOMOGRAPHY N/A 1/25/2022    Procedure: Liver Microwave Ablation;  Surgeon: Red Puentes MD;  Location: [facility];  Service: General;  Laterality: N/A;    CORONARY ARTERY BYPASS GRAFT (CABG) N/A 11/5/2018    Procedure: CORONARY ARTERY BYPASS GRAFT (CABG);  Surgeon: Bear De Luna MD;  Location: [facility] [place] OR;  Service: Cardiothoracic;  Laterality: N/A;  TWO VESSEL BYPASS WITH AORTOTOMY AND EXCISION OF ATHEROSCLEROTIC PLAQUE    CORONARY BYPASS GRAFT ANGIOGRAPHY  3/2/2020    Procedure: Bypass graft study;  Surgeon: Cora Cormier MD;  Location: [facility] [place] CATH LAB;  Service: Cardiology;;    ELBOW BURSA SURGERY Right 2010    ENDOSCOPIC HARVEST OF VEIN Left 11/5/2018    Procedure: SURGICAL PROCUREMENT, VEIN, ENDOSCOPIC;  Surgeon: Bear De Luna MD;  Location: [facility];  Service: Cardiothoracic;  Laterality: Left;    ESOPHAGOGASTRODUODENOSCOPY N/A 7/17/2019    Procedure: ESOPHAGOGASTRODUODENOSCOPY (EGD);  Surgeon: David Carter III, MD;  Location: [facility];  Service: Endoscopy;  Laterality: N/A;    ESOPHAGOGASTRODUODENOSCOPY N/A 12/29/2022    Procedure: ESOPHAGOGASTRODUODENOSCOPY (EGD);  Surgeon: Issa Gayle MD;  Location: [facility];  Service: Endoscopy;  Laterality: N/A;    ESOPHAGOGASTRODUODENOSCOPY N/A 1/17/2024    Procedure: EGD (ESOPHAGOGASTRODUODENOSCOPY);  Surgeon: Issa Gayle MD;  Location: [facility];  Service: Endoscopy;  Laterality: N/A;    ETHMOIDECTOMY Bilateral 3/27/2019    Procedure: ETHMOIDECTOMY;  Surgeon: Alejandro Parkinson MD;  Location: [facility];  Service: ENT;  Laterality: Bilateral;    EYE SURGERY      FOOT SURGERY      FRONTAL SINUS OBLITERATION Bilateral 3/27/2019    Procedure: SINUSOTOMY,  FRONTAL SINUS, OBLITERATIVE;  Surgeon: Alejandro Parkinson MD;  Location: [facility] OR;  Service: ENT;  Laterality: Bilateral;    FUNCTIONAL ENDOSCOPIC SINUS SURGERY (FESS) Bilateral 3/27/2019    Procedure: FESS (FUNCTIONAL ENDOSCOPIC SINUS SURGERY);  Surgeon: Alejandro Parkinson MD;  Location: [facility] OR;  Service: ENT;  Laterality: Bilateral;  Left Keila bullosa resection     INTRALUMINAL GASTROINTESTINAL TRACT IMAGING VIA CAPSULE N/A 2/2/2024    Procedure: IMAGING PROCEDURE, GI TRACT, INTRALUMINAL, VIA CAPSULE;  Surgeon: Cooper Estrella RN;  Location: [facility] ENDO;  Service: Endoscopy;  Laterality: N/A;    KNEE ARTHROSCOPY W/ MENISCAL REPAIR Left 06/2019    dr boykin    KNEE SURGERY Right     LEFT HEART CATHETERIZATION Left 3/2/2020    Procedure: CATHETERIZATION, HEART, LEFT;  Surgeon: Cora Cormier MD;  Location: [facility] CATH LAB;  Service: Cardiology;  Laterality: Left;    LIVER BIOPSY  01/2022    MAXILLARY ANTROSTOMY Bilateral 3/27/2019    Procedure: MAXILLARY ANTROSTOMY;  Surgeon: Alejandro Parkinson MD;  Location: [facility] OR;  Service: ENT;  Laterality: Bilateral;    NASAL SEPTOPLASTY N/A 3/27/2019    Procedure: SEPTOPLASTY, NOSE;  Surgeon: Alejandro Parkinson MD;  Location: [facility] OR;  Service: ENT;  Laterality: N/A;    RECTAL SURGERY  2012    STENT, SUPERIOR MESENTERIC ARTERY Left 1/4/2024    Procedure: STENT, SUPERIOR MESENTERIC ARTERY;  Surgeon: ALEX Alfred III, MD;  Location: [address];  Service: Vascular;  Laterality: Left;    TRANSURETHRAL RESECTION OF PROSTATE (TURP) WITHOUT USE OF LASER N/A 6/19/2018    Procedure: TURP, WITHOUT USING LASER;  Surgeon: Cooper Cordova IV, MD;  Location: [facility];  Service: Urology;  Laterality: N/A;    TRIGGER FINGER RELEASE Left        Review of patient's allergies indicates:   Allergen Reactions    Lipitor [atorvastatin] Other (See Comments)     Muscle aches and pains as well as fatigue.     Niacin preparations Other (See Comments)     Causes broken blood vessels and bruises at 4 times normal dose.     Statins-hmg-coa reductase inhibitors Other (See Comments)     Statins cause intolerable myalgias.    Iodinated contrast media Hives     Tachycardia    Omeprazole Hives and Itching    Prilosec [omeprazole magnesium] Hives and Itching       Current Facility-Administered Medications on File Prior to Encounter   Medication    lactated ringers infusion     Current Outpatient Medications on File Prior to Encounter   Medication Sig    ACCU-CHEK GRACE PLUS METER Misc AS DIRECTED    albuterol (VENTOLIN HFA) 90 mcg/actuation inhaler Inhale 2 puffs into the lungs every 6 (six) hours as needed for Wheezing or Shortness of Breath. Rescue    amLODIPine (NORVASC) 10 MG tablet TAKE 1 TABLET BY MOUTH ONCE A DAY (DOSE INCREASE) (Patient taking differently: Take by mouth every morning.)    blood sugar diagnostic (ACCU-CHEK GRACE PLUS TEST STRP) Strp TEST THREE TIMES A DAY    budesonide-formoterol 160-4.5 mcg (SYMBICORT) 160-4.5 mcg/actuation HFAA INHALE 2 PUFFS INTO THE LUNGS TWO TIMES A DAY.    carvediloL (COREG) 3.125 MG tablet TAKE 1 TABLET BY MOUTH TWICE DAILY WITH FOOD    empagliflozin (JARDIANCE) 25 mg tablet Take 1 tablet (25 mg total) by mouth once daily. (Patient taking differently: Take 25 mg by mouth every morning.)    finasteride (PROSCAR) 5 mg tablet TAKE 1 TABLET BY MOUTH once daily    furosemide (LASIX) 40 MG tablet Take 1 tablet (40 mg total) by mouth 2 (two) times daily.    GLUCOSAMINE HCL/CHONDR ROSARIO A NA (OSTEO BI-FLEX ORAL) Take 1 tablet by mouth 2 (two) times daily.     insulin (LANTUS SOLOSTAR U-100 INSULIN) glargine 100 units/mL SubQ pen Inject 44 Units into the skin every evening.    ipratropium (ATROVENT) 21 mcg (0.03 %) nasal spray 2 sprays by Each Nostril route 2 (two) times daily.    lancets (ACCU-CHEK FASTCLIX LANCET DRUM) Misc TEST TWO TIMES A DAY    levocetirizine (XYZAL) 5 MG tablet Take 1 tablet (5 mg total) by mouth every evening.    levothyroxine (SYNTHROID) 300 MCG Tab TAKE 1 TABLET BY MOUTH EVERY  "MORNING    lisinopriL 10 MG tablet TAKE 1 BY MOUTH EVERY DAY (Patient taking differently: Take 10 mg by mouth every morning.)    metFORMIN (GLUCOPHAGE-XR) 500 MG ER 24hr tablet Take 1 tablet (500 mg total) by mouth 2 (two) times daily with meals.    montelukast (SINGULAIR) 10 mg tablet Take 1 tablet (10 mg total) by mouth once daily.    pen needle, diabetic (BD ULTRA-FINE MINI PEN NEEDLE) 31 gauge x 3/16" Ndle USE AS DIRECTED    propafenone (RHTHYMOL) 150 MG Tab Take 1 tablet (150 mg total) by mouth every 8 (eight) hours.    RABEprazole (ACIPHEX) 20 mg tablet Take 1 tablet (20 mg total) by mouth 2 (two) times daily.    REPATHA SURECLICK 140 mg/mL PnIj SMARTSI Milligram(s) Topical Every 2 Weeks    sildenafiL (VIAGRA) 100 MG tablet Take 1/2 to 1 tablet by mouth one hour prior to intercourse. Max 100mg per day     Family History       Problem Relation (Age of Onset)    Heart disease Mother, Father, Brother          Tobacco Use    Smoking status: Former     Current packs/day: 0.00     Average packs/day: 0.5 packs/day for 4.0 years (2.0 ttl pk-yrs)     Types: Cigarettes     Start date: 1968     Quit date: 1972     Years since quittin.7    Smokeless tobacco: Former     Types: Chew     Quit date: 1976   Substance and Sexual Activity    Alcohol use: Yes     Alcohol/week: 2.0 standard drinks of alcohol     Types: 2 Cans of beer per week    Drug use: No    Sexual activity: Yes     Partners: Female     Review of Systems   Constitutional: Negative.   HENT: Negative.     Eyes: Negative.    Cardiovascular:  Positive for chest pain and dyspnea on exertion.   Respiratory:  Positive for shortness of breath.    Endocrine: Negative.    Hematologic/Lymphatic: Negative.    Skin: Negative.    Musculoskeletal: Negative.    Gastrointestinal: Negative.    Genitourinary: Negative.    Neurological:  Positive for numbness.   Psychiatric/Behavioral: Negative.     Allergic/Immunologic: Negative.      Objective: " "    Vital Signs (Most Recent):  Temp: 98.8 °F (37.1 °C) (02/23/24 1039)  Pulse: 78 (02/23/24 1402)  Resp: 18 (02/23/24 1250)  BP: (!) 162/76 (02/23/24 1402)  SpO2: 99 % (02/23/24 1402) Vital Signs (24h Range):  Temp:  [98.8 °F (37.1 °C)] 98.8 °F (37.1 °C)  Pulse:  [78-88] 78  Resp:  [17-18] 18  SpO2:  [99 %-100 %] 99 %  BP: (162-184)/(76-91) 162/76     Weight: 102.5 kg (226 lb)  Body mass index is 35.4 kg/m².    SpO2: 99 %       No intake or output data in the 24 hours ending 02/23/24 1544    Lines/Drains/Airways       Peripheral Intravenous Line  Duration                  Peripheral IV - Single Lumen 02/23/24 1223 20 G Left Antecubital <1 day                     Physical Exam  Vitals and nursing note reviewed.   Constitutional:       Appearance: He is well-developed.   HENT:      Head: Normocephalic.   Neck:      Thyroid: No thyromegaly.      Vascular: Normal carotid pulses. No carotid bruit, hepatojugular reflux or JVD.   Cardiovascular:      Rate and Rhythm: Normal rate and regular rhythm.      Chest Wall: PMI is not displaced.      Pulses:           Radial pulses are 2+ on the right side and 2+ on the left side.      Heart sounds: S1 normal and S2 normal. Harsh systolic murmur noted.     No friction rub. No S3 or S4 sounds.   Pulmonary:      Effort: Pulmonary effort is normal.      Breath sounds: Normal breath sounds. No wheezing or rales.   Abdominal:      General: Bowel sounds are normal. There is no distension or abdominal bruit.      Palpations: Abdomen is soft. There is no mass.      Tenderness: There is no abdominal tenderness.   Musculoskeletal:      Cervical back: Normal range of motion and neck supple.   Skin:     General: Skin is warm.   Neurological:      Mental Status: He is alert and oriented to person, place, and time.   Psychiatric:         Behavior: Behavior normal.          Significant Labs: ABG: No results for input(s): "PH", "PCO2", "HCO3", "POCSATURATED", "BE" in the last 48 hours., Blood " "Culture: No results for input(s): "LABBLOO" in the last 48 hours., BMP:   Recent Labs   Lab 02/23/24  1223         K 4.0      CO2 25   BUN 15   CREATININE 1.0   CALCIUM 9.2   , CMP   Recent Labs   Lab 02/23/24  1223      K 4.0      CO2 25      BUN 15   CREATININE 1.0   CALCIUM 9.2   PROT 7.3   ALBUMIN 3.7   BILITOT 0.4   ALKPHOS 76   AST 19   ALT 15   ANIONGAP 11   , CBC   Recent Labs   Lab 02/23/24  1223   WBC 4.40   HGB 8.2*   HCT 27.2*      , INR   Recent Labs   Lab 02/23/24  1324   INR 1.0   , Lipid Panel No results for input(s): "CHOL", "HDL", "LDLCALC", "TRIG", "CHOLHDL" in the last 48 hours., and Troponin   Recent Labs   Lab 02/23/24  1223   TROPONINI 0.006       Results for orders placed during the hospital encounter of 08/28/23    Echo    Interpretation Summary    Left Ventricle: The left ventricle is normal in size. Normal wall thickness. There is mild concentric hypertrophy. Normal wall motion. There is normal systolic function. Ejection fraction by visual approximation is 55%. There is normal diastolic function.    Right Ventricle: Normal right ventricular cavity size. Wall thickness is normal. Right ventricle wall motion  is normal. Systolic function is normal.    Aortic Valve: There is moderate stenosis. Aortic valve area by VTI is 1.06 cm². Aortic valve peak velocity is 3.05 m/s. Mean gradient is 22 mmHg. The dimensionless index is 0.30. There is mild to moderate aortic regurgitation.    Mitral Valve:  There is mild regurgitation.    Tricuspid Valve: There is mild regurgitation.    Pulmonary Artery: The estimated pulmonary artery systolic pressure is 30 mmHg.      Results for orders placed during the hospital encounter of 08/28/23    Nuclear Stress - Cardiology Interpreted    Interpretation Summary    Normal myocardial perfusion scan. There is no evidence of myocardial ischemia or infarction.    The gated perfusion images showed an ejection fraction of 62% " at rest. The gated perfusion images showed an ejection fraction of 72% post stress.    The ECG portion of the study is negative for ischemia.    The patient reported no chest pain during the stress test.    The Baseline ECG reveals atrial fibrillation.    Assessment and Plan:     Episode of transient neurologic symptoms  Stroke workup planned in ER.    Left leg numbness  Stroke workup planned in ER with imaging.      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    Paroxysmal atrial fibrillation  Monitor on telemetry.  Xarelto recently stopped due to severe anemia.    Will need EP consultation in future -- can consider LAAO device option in future.    Type 2 diabetes mellitus with microalbuminuria, with long-term current use of insulin  Optimal HGAIC control.    Exertional angina  Exertional angina.  No acute ecg ischemia noted.    Serial troponin levels.  Echocardiogram.    Will treat with more optimal med tx.  Increase Coreg.  Add Ranexa.  Did not tolerate nitrates w headaches, also uses Viagra.    Not good candidate for invasive cath right now with inability to take DAPT for intervention with recent severe anemia and blood thinners were stopped.    Sl ntg prn.      Nonrheumatic aortic valve stenosis  Monitor.    Essential hypertension  Optimize meds as needed for HTN control.        VTE Risk Mitigation (From admission, onward)           Ordered     Reason for No Pharmacological VTE Prophylaxis  Once        Question:  Reasons:  Answer:  Risk of Bleeding    02/23/24 1409     IP VTE HIGH RISK PATIENT  Once         02/23/24 1409     Place sequential compression device  Until discontinued         02/23/24 1409                    Thank you for your consult.       Jono Obregon MD  Cardiology   [facility] - Emergency Dept.

## 2024-02-23 NOTE — ED PROVIDER NOTES
Emergency Medicine Provider Note - 2/23/2024       SCRIBE NOTE: I, Michele Bruce, am scribing for, and in the presence of, Brissa Manzanares DO.     History     Chief Complaint   Patient presents with    Shortness of Breath     Pt c/o SOB x 1 week, worsening.  Pt had stent placement in January and had a blood transfusion 2 weeks ago.       Allergies:  Review of patient's allergies indicates:   Allergen Reactions    Lipitor [atorvastatin] Other (See Comments)     Muscle aches and pains as well as fatigue.     Niacin preparations Other (See Comments)     Causes broken blood vessels and bruises at 4 times normal dose.    Statins-hmg-coa reductase inhibitors Other (See Comments)     Statins cause intolerable myalgias.    Iodinated contrast media Hives     Tachycardia    Omeprazole Hives and Itching    Prilosec [omeprazole magnesium] Hives and Itching        History of Present Illness   HPI    2/23/2024, 12:35 PM  The history is provided by the patient    Erendira Pretty is a 78 y.o. male with a PMHx of open heart surgery, CAD, Afib, DM, anemia, HTN, and HLD presenting to the ED for chest tightness and left leg numbness which onset this AM. He states he was pulling up weeds in his garden when he began feeling chest tightness that went away after a period of rest. It returned when he walked to the ED from his vehicle.  He reports that the chest tightness is better with rest.  He reports that this is exactly what he felt like before he had his open heart surgery.  Associated symptoms include shortness of breath.  Patient denies any diaphoresis or emesis.  Patient is currently chest pain-free.  He also notes that he developed left leg numbness onset about 1 hour ago while he was in the waiting room.  It is not associated with any vision changes, difficulty talking, difficulty swallowing, paresthesias of the upper extremity, or weakness.  Patient unfortunately has had history of GI bleeding.  He currently denies any melena or  hematochezia.      Reviewed old charts:  Impression:            - Normal small bowel.                          - Normal video capsule endoscopy.   Recommendation:        - Continue present medications.   MD Charlotte Lancaster MD   2/5/2024 6:45:08 PM       Impression:            - Normal esophagus.                          - Grade I esophageal varices.                          - Gastritis.                          - Normal examined duodenum.                          - No specimens collected.   Recommendation:        - [facility] patient to [facility] for [facility].                          - Advance diet as tolerated if HnH remains stable                          - Continue present medications.   MD Issa Duncan MD   1/17/2024 1:06:06 PM     Impression:            - The examined portion of the ileum was normal.                          - Mucosal ulceration. Biopsied.                          - A single (solitary) ulcer in the ascending                          colon. Biopsied.                          - A single (solitary) ulcer in the transverse                          colon. Biopsied.                          - One 1 mm polyp in the transverse colon, removed                          with a jumbo cold forceps. Resected and retrieved.                          - One 1 mm polyp in the sigmoid colon, removed                          with a jumbo cold forceps. Resected and retrieved.                          - The examination was otherwise normal.                          - Hemorrhoids found on perianal exam.   Recommendation:        - Discharge patient to home (with escort).                          - Resume previous diet.                          - Continue present medications.                          - Resume Xarelto (rivaroxaban) at prior dose in 3                          days. Refer to referring physician for further                          adjustment of  therapy.                          - Perform a CT scan (computed tomography) of chest                          with contrast and abdomen with contrast. This will                          be scheduled for you according to your                          availability. Please call the Radiology department                          at [phone].                          - Await pathology results.                          - Repeat colonoscopy in 5 years for surveillance                          based on clinical status at that time.                          - Return to GI clinic with Ms. Jess Reardon STEVIEFRANCISCO.   MD Ama Linares MD   12/14/2023 8:37:12 AM     Arrival mode: Private Vehicle     PCP: Bhupinder Callaway MD     Past Medical History:  Past Medical History:   Diagnosis Date    Anticoagulant long-term use     Aortic stenosis     Asthma     Benign prostatic hyperplasia with nocturia     Bilateral carotid artery stenosis 07/21/2021    US CAROTID BILATERAL 07/14/2021 IMPRESSION: Atherosclerosis with suspected stenosis greater than 50% at the right proximal ICA visually. Velocities are discordant and appear improved from prior. Atherosclerosis on the left with no evidence of flow-limiting stenosis greater than 50%.  FINDINGS: Right: Internal Carotid Artery (ICA): Peak systolic velocity 118 cm/sec. 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 09/22/2020    DELONTE on CPAP     Osteoarthritis     Paroxysmal atrial fibrillation      PVD (peripheral vascular disease)     Secondary esophageal varices without bleeding 06/26/2017    Type 2 diabetes mellitus        Past Surgical History:  Past Surgical History:   Procedure Laterality Date    ANGIOGRAM, EXTREMITY, UNILATERAL Left 1/4/2024    Procedure: ANGIOGRAM, EXTREMITY, UNILATERAL- Femoral / SMS Stent, Poss General;  Surgeon: ALEX Alfred III, MD;  Location: [address];  Service: Vascular;  Laterality: Left;  mGy : 2698.78  Gy.cm : 229.88  Contrast : 62ml  Fluro time : 13.8min    CARDIAC CATHETERIZATION      CARDIAC SURGERY      CARPAL TUNNEL RELEASE Left     CATARACT EXTRACTION      CATARACT EXTRACTION W/ INTRAOCULAR LENS  IMPLANT, BILATERAL  2008    CATHETERIZATION OF BOTH LEFT AND RIGHT HEART N/A 11/2/2018    Procedure: CATHETERIZATION, HEART, BOTH LEFT AND RIGHT;  Surgeon: Frank Gallardo MD;  Location: [facility] CATH LAB;  Service: Cardiology;  Laterality: N/A;    COLONOSCOPY  8/2013    COLONOSCOPY N/A 5/30/2016    Procedure: COLONOSCOPY;  Surgeon: Anna Tomas MD;  Location: [facility] ENDO;  Service: Endoscopy;  Laterality: N/A;    COLONOSCOPY N/A 7/17/2019    Procedure: COLONOSCOPY;  Surgeon: David Carter III, MD;  Location: [facility];  Service: Endoscopy;  Laterality: N/A;    COLONOSCOPY N/A 12/14/2023    Procedure: COLONOSCOPY;  Surgeon: Ama Barrera MD;  Location: [facility];  Service: Endoscopy;  Laterality: N/A;    COMPUTED TOMOGRAPHY N/A 9/9/2019    Procedure: CT (COMPUTED TOMOGRAPHY);  Surgeon: [facility] Diagnostic Provider;  Location: [facility] OR;  Service: General;  Laterality: N/A;  Microwave ablation to be done in CT.  Need CRNA and cart    COMPUTED TOMOGRAPHY N/A 1/25/2022    Procedure: Liver Microwave Ablation;  Surgeon: Red Puentes MD;  Location: [facility];  Service: General;  Laterality: N/A;    CORONARY ARTERY BYPASS GRAFT (CABG) N/A 11/5/2018    Procedure: CORONARY ARTERY BYPASS GRAFT (CABG);  Surgeon: Bear De Luna MD;  Location: [facility];  Service:  Cardiothoracic;  Laterality: N/A;  TWO VESSEL BYPASS WITH AORTOTOMY AND EXCISION OF ATHEROSCLEROTIC PLAQUE    CORONARY BYPASS GRAFT ANGIOGRAPHY  3/2/2020    Procedure: Bypass graft study;  Surgeon: Cora Cormier MD;  Location: [facility] CATH LAB;  Service: Cardiology;;    ELBOW BURSA SURGERY Right 2010    ENDOSCOPIC HARVEST OF VEIN Left 11/5/2018    Procedure: SURGICAL PROCUREMENT, VEIN, ENDOSCOPIC;  Surgeon: Bear De Luna MD;  Location: [facility] OR;  Service: Cardiothoracic;  Laterality: Left;    ESOPHAGOGASTRODUODENOSCOPY N/A 7/17/2019    Procedure: ESOPHAGOGASTRODUODENOSCOPY (EGD);  Surgeon: David Carter III, MD;  Location: [facility] ENDO;  Service: Endoscopy;  Laterality: N/A;    ESOPHAGOGASTRODUODENOSCOPY N/A 12/29/2022    Procedure: ESOPHAGOGASTRODUODENOSCOPY (EGD);  Surgeon: Issa Gayle MD;  Location: [facility];  Service: Endoscopy;  Laterality: N/A;    ESOPHAGOGASTRODUODENOSCOPY N/A 1/17/2024    Procedure: EGD (ESOPHAGOGASTRODUODENOSCOPY);  Surgeon: Issa Gayle MD;  Location: [facility];  Service: Endoscopy;  Laterality: N/A;    ETHMOIDECTOMY Bilateral 3/27/2019    Procedure: ETHMOIDECTOMY;  Surgeon: Alejandro Parkinson MD;  Location: [facility] OR;  Service: ENT;  Laterality: Bilateral;    EYE SURGERY      FOOT SURGERY      FRONTAL SINUS OBLITERATION Bilateral 3/27/2019    Procedure: SINUSOTOMY, FRONTAL SINUS, OBLITERATIVE;  Surgeon: Alejandro Parkinson MD;  Location: [facility] OR;  Service: ENT;  Laterality: Bilateral;    FUNCTIONAL ENDOSCOPIC SINUS SURGERY (FESS) Bilateral 3/27/2019    Procedure: FESS (FUNCTIONAL ENDOSCOPIC SINUS SURGERY);  Surgeon: Alejandro Parkinson MD;  Location: [facility] OR;  Service: ENT;  Laterality: Bilateral;  Left Keila bullosa resection     INTRALUMINAL GASTROINTESTINAL TRACT IMAGING VIA CAPSULE N/A 2/2/2024    Procedure: IMAGING PROCEDURE, GI TRACT, INTRALUMINAL, VIA CAPSULE;  Surgeon: Cooper Estrella RN;  Location: [facility];  Service: Endoscopy;  Laterality: N/A;    KNEE ARTHROSCOPY W/ MENISCAL REPAIR  Left 2019    dr boykin    KNEE SURGERY Right     LEFT HEART CATHETERIZATION Left 3/2/2020    Procedure: CATHETERIZATION, HEART, LEFT;  Surgeon: Cora Cormier MD;  Location: [facility] CATH LAB;  Service: Cardiology;  Laterality: Left;    LIVER BIOPSY  2022    MAXILLARY ANTROSTOMY Bilateral 3/27/2019    Procedure: MAXILLARY ANTROSTOMY;  Surgeon: Alejandro Parkinson MD;  Location: [facility] OR;  Service: ENT;  Laterality: Bilateral;    NASAL SEPTOPLASTY N/A 3/27/2019    Procedure: SEPTOPLASTY, NOSE;  Surgeon: Alejandro Parkinson MD;  Location: [facility];  Service: ENT;  Laterality: N/A;    RECTAL SURGERY      STENT, SUPERIOR MESENTERIC ARTERY Left 2024    Procedure: STENT, SUPERIOR MESENTERIC ARTERY;  Surgeon: ALEX Alfred III, MD;  Location: [address];  Service: Vascular;  Laterality: Left;    TRANSURETHRAL RESECTION OF PROSTATE (TURP) WITHOUT USE OF LASER N/A 2018    Procedure: TURP, WITHOUT USING LASER;  Surgeon: Cooper Cordova IV, MD;  Location: [facility];  Service: Urology;  Laterality: N/A;    TRIGGER FINGER RELEASE Left          Family History:  Family History   Problem Relation Age of Onset    Heart disease Mother     Heart disease Father     Heart disease Brother     Colon cancer Neg Hx        Social History:  Social History     Tobacco Use    Smoking status: Former     Current packs/day: 0.00     Average packs/day: 0.5 packs/day for 4.0 years (2.0 ttl pk-yrs)     Types: Cigarettes     Start date: 1968     Quit date: 1972     Years since quittin.7    Smokeless tobacco: Former     Types: Chew     Quit date: 1976   Substance and Sexual Activity    Alcohol use: Yes     Alcohol/week: 2.0 standard drinks of alcohol     Types: 2 Cans of beer per week    Drug use: No    Sexual activity: Yes     Partners: Female        Review of Systems   Review of Systems   Constitutional:  Negative for diaphoresis and fever.   HENT:  Negative for sore throat.    Respiratory:  Positive for chest tightness and  shortness of breath. Negative for cough.    Cardiovascular:  Positive for chest pain (with exertion).   Gastrointestinal:  Negative for blood in stool, diarrhea, nausea and vomiting.   Genitourinary:  Negative for dysuria.   Musculoskeletal:  Negative for back pain.   Skin:  Negative for rash.   Neurological:  Positive for numbness (Left leg). Negative for weakness.   Hematological:  Does not bruise/bleed easily.   All other systems reviewed and are negative.       Physical Exam     Initial Vitals [02/23/24 1039]   BP Pulse Resp Temp SpO2   (!) 177/91 84 18 98.8 °F (37.1 °C) 100 %      MAP       --          Physical Exam    Nursing Notes and Vital Signs Reviewed.  Constitutional: Patient is in no acute distress. Well-developed and well-nourished.  Head: Atraumatic. Normocephalic.  Eyes: PERRL. EOM intact. Conjunctivae are not pale. No scleral icterus.  ENT: Mucous membranes are moist. Oropharynx is clear and symmetric.    Neck: Supple. Full ROM. No lymphadenopathy.  Cardiovascular: Regular rate. Regular rhythm. Systolic murmur. No rubs or gallops. Distal pulses are 2+ and symmetric.  Pulmonary/Chest: No respiratory distress. Clear to auscultation bilaterally. No wheezing or rales.  Abdominal: Soft and non-distended.  There is no tenderness.  No rebound, guarding, or rigidity. Good bowel sounds.  Genitourinary: No CVA tenderness  Musculoskeletal: Moves all extremities. No obvious deformities. No edema. No calf tenderness.  Skin: Pale-appearing.  Neurological:  Alert, awake, and appropriate.  Normal speech.  No acute focal neurological deficits are appreciated.  Cranial nerves 2-12 intact.  Negative heel drop.  Finger-to-nose intact.  Negative pronator  Psychiatric: Normal affect. Good eye contact. Appropriate in content.     ED Course   ED Procedures:  Procedures    ED Vital Signs:  Vitals:    02/23/24 1242 02/23/24 1250 02/23/24 1257 02/23/24 1325   BP: (!) 183/88 (!) 184/90  (!) 186/86   Pulse: 85 88 80 80   Resp:  "17 18     Temp:       TempSrc:       SpO2: 100% 100%  99%   Weight:       Height:        02/23/24 1402 02/23/24 1608 02/23/24 1630 02/23/24 1648   BP: (!) 162/76 (!) 177/72 (!) 166/79 (!) 154/72   Pulse: 78 83 79 78   Resp:  16 19 16   Temp:   98.2 °F (36.8 °C) 98.2 °F (36.8 °C)   TempSrc:   Oral Oral   SpO2: 99% 100% 100% 100%   Weight: 102.5 kg (226 lb)      Height: 5' 7" (1.702 m)       02/23/24 1703 02/23/24 1748 02/23/24 1845 02/23/24 1941   BP: (!) 175/82 (!) 162/81 (!) 153/85    Pulse: 77 83 81 73   Resp: 16 17 (!) 28 18   Temp: 98.4 °F (36.9 °C)      TempSrc: Oral      SpO2: 100% 99% 99% 100%   Weight:       Height:        02/23/24 2028 02/23/24 2037 02/24/24 0012   BP: (!) 159/77  129/60   Pulse: 80 87 76   Resp: 16  17   Temp: 98 °F (36.7 °C)  98.2 °F (36.8 °C)   TempSrc:      SpO2: 97%  95%   Weight:      Height:          Abnormal Lab Results:  Labs Reviewed   CBC W/ AUTO DIFFERENTIAL - Abnormal; Notable for the following components:       Result Value    RBC 3.44 (*)     Hemoglobin 8.2 (*)     Hematocrit 27.2 (*)     MCV 79 (*)     MCH 23.8 (*)     MCHC 30.1 (*)     RDW 17.1 (*)     Mono % 15.7 (*)     All other components within normal limits   B-TYPE NATRIURETIC PEPTIDE - Abnormal; Notable for the following components:     (*)     All other components within normal limits   URINALYSIS, REFLEX TO URINE CULTURE - Abnormal; Notable for the following components:    Color, UA Colorless (*)     Specific Gravity, UA <1.005 (*)     Glucose, UA 3+ (*)     All other components within normal limits    Narrative:     Specimen Source->Urine   FERRITIN - Abnormal; Notable for the following components:    Ferritin 13 (*)     All other components within normal limits   COMPREHENSIVE METABOLIC PANEL   TROPONIN I   PROTIME-INR   TSH   URINALYSIS MICROSCOPIC    Narrative:     Specimen Source->Urine   IRON AND TIBC   FERRITIN   TROPONIN I    Narrative:     STAT, if not done in ED, then at 2nd and 6th hour " from  initial draw.   URINALYSIS, REFLEX TO URINE CULTURE   POCT GLUCOSE, HAND-HELD DEVICE   TYPE & SCREEN   POCT GLUCOSE   PREPARE RBC SOFT        All Lab Results:  Results for orders placed or performed during the hospital encounter of 02/23/24   CBC auto differential   Result Value Ref Range    WBC 4.40 3.90 - 12.70 K/uL    RBC 3.44 (L) 4.60 - 6.20 M/uL    Hemoglobin 8.2 (L) 14.0 - 18.0 g/dL    Hematocrit 27.2 (L) 40.0 - 54.0 %    MCV 79 (L) 82 - 98 fL    MCH 23.8 (L) 27.0 - 31.0 pg    MCHC 30.1 (L) 32.0 - 36.0 g/dL    RDW 17.1 (H) 11.5 - 14.5 %    Platelets 195 150 - 450 K/uL    MPV 9.6 9.2 - 12.9 fL    Immature Granulocytes 0.0 0.0 - 0.5 %    Gran # (ANC) 2.3 1.8 - 7.7 K/uL    Immature Grans (Abs) 0.00 0.00 - 0.04 K/uL    Lymph # 1.3 1.0 - 4.8 K/uL    Mono # 0.7 0.3 - 1.0 K/uL    Eos # 0.1 0.0 - 0.5 K/uL    Baso # 0.02 0.00 - 0.20 K/uL    nRBC 0 0 /100 WBC    Gran % 51.5 38.0 - 73.0 %    Lymph % 29.1 18.0 - 48.0 %    Mono % 15.7 (H) 4.0 - 15.0 %    Eosinophil % 3.2 0.0 - 8.0 %    Basophil % 0.5 0.0 - 1.9 %    Differential Method Automated    Comprehensive metabolic panel   Result Value Ref Range    Sodium 140 136 - 145 mmol/L    Potassium 4.0 3.5 - 5.1 mmol/L    Chloride 104 95 - 110 mmol/L    CO2 25 23 - 29 mmol/L    Glucose 108 70 - 110 mg/dL    BUN 15 8 - 23 mg/dL    Creatinine 1.0 0.5 - 1.4 mg/dL    Calcium 9.2 8.7 - 10.5 mg/dL    Total Protein 7.3 6.0 - 8.4 g/dL    Albumin 3.7 3.5 - 5.2 g/dL    Total Bilirubin 0.4 0.1 - 1.0 mg/dL    Alkaline Phosphatase 76 55 - 135 U/L    AST 19 10 - 40 U/L    ALT 15 10 - 44 U/L    eGFR >60 >60 mL/min/1.73 m^2    Anion Gap 11 8 - 16 mmol/L   Brain natriuretic peptide   Result Value Ref Range     (H) 0 - 99 pg/mL   Troponin I   Result Value Ref Range    Troponin I 0.006 0.000 - 0.026 ng/mL   Urinalysis, Reflex to Urine Culture Urine, Clean Catch    Specimen: Urine, Clean Catch   Result Value Ref Range    Specimen UA Urine, Clean Catch     Color, UA Colorless (A)  Yellow, Straw, Shayy    Appearance, UA Clear Clear    pH, UA 7.0 5.0 - 8.0    Specific Gravity, UA <1.005 (A) 1.005 - 1.030    Protein, UA Negative Negative    Glucose, UA 3+ (A) Negative    Ketones, UA Negative Negative    Bilirubin (UA) Negative Negative    Occult Blood UA Negative Negative    Nitrite, UA Negative Negative    Urobilinogen, UA Negative <2.0 EU/dL    Leukocytes, UA Negative Negative   Protime-INR   Result Value Ref Range    Prothrombin Time 11.7 9.0 - 12.5 sec    INR 1.0 0.8 - 1.2   TSH   Result Value Ref Range    TSH 1.434 0.400 - 4.000 uIU/mL   LDL - Lipid Panel   Result Value Ref Range    Cholesterol 91 (L) 120 - 199 mg/dL    Triglycerides 99 30 - 150 mg/dL    HDL 43 40 - 75 mg/dL    LDL Cholesterol 28.2 (L) 63.0 - 159.0 mg/dL    HDL/Cholesterol Ratio 47.3 20.0 - 50.0 %    Total Cholesterol/HDL Ratio 2.1 2.0 - 5.0    Non-HDL Cholesterol 48 mg/dL   Urinalysis Microscopic   Result Value Ref Range    Bacteria None None-Occ /hpf    Yeast, UA None None    Microscopic Comment SEE COMMENT    Iron and TIBC   Result Value Ref Range    Iron 11 (L) 45 - 160 ug/dL    Transferrin 316 200 - 375 mg/dL    TIBC 468 (H) 250 - 450 ug/dL    Saturated Iron 2 (L) 20 - 50 %   Ferritin   Result Value Ref Range    Ferritin 13 (L) 20.0 - 300.0 ng/mL   Troponin I   Result Value Ref Range    Troponin I 0.022 0.000 - 0.026 ng/mL   Troponin I   Result Value Ref Range    Troponin I <0.006 0.000 - 0.026 ng/mL   EKG 12-lead (Shortness of Breath) Age > 50   Result Value Ref Range    QRS Duration 80 ms    OHS QTC Calculation 412 ms   Echo Saline Bubble? No   Result Value Ref Range    Left Atrium Major Axis 5.71 cm    Left Atrium Minor Axis 6.14 cm    RA Major Axis 5.60 cm    LV Diastolic Volume 89.47 mL    LV Systolic Volume 39.13 mL    TR Max Delbert 3.53 m/s    AR Max Delbert 4.04 m/s    PV mean gradient 1 mmHg    RVOT peak VTI 16.0 cm    RVOT peak delbert 0.81 m/s    Ao VTI 85.30 cm    Ao peak delbert 3.76 m/s    LVOT peak VTI 23.70 cm    LVOT  peak chandrika 0.99 m/s    LVOT diameter 2.10 cm    PV peak gradient 6 mmHg    AV mean gradient 31 mmHg    AV regurgitation pressure 1/2 time [id number] ms    TAPSE 1.84 cm    RVDD 3.84 cm    LA size 4.92 cm    Ascending aorta 3.60 cm    STJ 2.96 cm    LVIDs 3.14 2.1 - 4.0 cm    Ao root annulus 3.64 cm    Posterior Wall 1.26 (A) 0.6 - 1.1 cm    IVS 1.33 (A) 0.6 - 1.1 cm    LVIDd 4.44 3.5 - 6.0 cm    PV PEAK VELOCITY 1.20 m/s    Left Ventricular Outflow Tract Mean Gradient 2.07 mmHg    Left Ventricular Outflow Tract Mean Velocity 0.66 cm/s    IVC diameter 2.13 cm    FS 29 28 - 44 %    LV mass 216.84 g    Left Ventricle Relative Wall Thickness 0.57 cm    AV valve area 0.96 cm²    AV Velocity Ratio 0.26     AV index (prosthetic) 0.28     LVOT area 3.5 cm2    LVOT stroke volume 82.05 cm3    AV peak gradient 57 mmHg    Triscuspid Valve Regurgitation Peak Gradient 50 mmHg    RADHA by Velocity Ratio 0.91 cm²    BSA 2.2 m2    LV Systolic Volume Index 18.4 mL/m2    LV Diastolic Volume Index 42.00 mL/m2    LV Mass Index 102 g/m2    ZLVIDS -2.19     ZLVIDD -4.25     LA Volume Index 54.6 mL/m2    LA volume 116.30 cm3    LA WIDTH 4.7 cm    RA Width 4.5 cm    TV resting pulmonary artery pressure 58 mmHg    RV TB RVSP 12 mmHg    Est. RA pres 8 mmHg   Type & Screen   Result Value Ref Range    Group & Rh O POS     Indirect Rock NEG     Specimen Outdate 02/26/2024 23:59    POCT glucose   Result Value Ref Range    POCT Glucose 99 70 - 110 mg/dL   Prepare RBC 1 Unit   Result Value Ref Range    UNIT NUMBER [id number]     Product Code [id number]     DISPENSE STATUS ISSUED     CODING SYSTEM [id number]     Unit Blood Type Code 5100     Unit Blood Type O POS     Unit Expiration [id number]     CROSSMATCH INTERPRETATION Compatible      *Note: Due to a large number of results and/or encounters for the requested time period, some results have not been displayed. A complete set of results can be found in Results Review.       Reviewed Prior  Labs:   Latest Reference Range & Units 01/29/24 09:26 01/31/24 10:02 02/06/24 08:05 02/07/24 12:11 02/15/24 08:00 02/23/24 12:23   Hemoglobin 14.0 - 18.0 g/dL 8.3 (L) 7.8 (L) 7.2 (L) 7.8 (L) 8.5 (L) 8.2 (L)   Hematocrit 40.0 - 54.0 % 25.9 (L) 25.1 (L) 24.5 (L) 25.9 (L) 28.1 (L) 27.2 (L)   (L): Data is abnormally low    The EKG was ordered, reviewed, and independently interpreted by the ED provider:      ECG Results              EKG 12-lead (Shortness of Breath) Age > 50 (Final result)        Collection Time Result Time QRS Duration OHS QTC Calculation    02/23/24 10:35:47 02/23/24 16:33:26 80 412                     Final result by Interface, Lab In [facility] (02/23/24 16:33:32)                   Narrative:    Test Reason : R06.02,    Vent. Rate : 079 BPM     Atrial Rate : 079 BPM     P-R Int : 338 ms          QRS Dur : 080 ms      QT Int : 360 ms       P-R-T Axes : 066 032 075 degrees     QTc Int : 412 ms    Possible Sinus rhythm with marked sinus arrythmia with 1st degree A-V block  Abnormal ECG  Confirmed by JOSÉ MANUEL BLANCO MD ([id number]) on 2/23/2024 4:33:25 PM    Referred By: [email or web address]   SELF           Confirmed By:JOSÉ MANUEL BLANCO MD                      Wet Read by Brissa Manzanares DO (02/23/24 12:59:33, [facility] - Emergency Dept., Emergency Medicine)    Sinus rhythm.  Rate of 79 beats per minute.  Normal axis.  Nonspecific findings in the lateral leads.  No ST segment elevation.  No STEMI.  This is compared to previous EKG dated February 7, 2024-sinus rhythm has replaced atrial fibrillation.                                    Imaging Results:  Imaging Results              MRA Brain (Final result)  Result time 02/23/24 20:46:44      Final result by Eliz Mcbride MD (02/23/24 20:46:44)                   Impression:      No large vessel occlusion or critical stenosis seen      Electronically signed by: Eliz Mbcride  Date:    02/23/2024  Time:    20:46               Narrative:    EXAMINATION:  MRA BRAIN WITHOUT  CONTRAST    CLINICAL HISTORY:  Transient ischemic attack (TIA); .    TECHNIQUE:  Non-contrast 3-D time-of-flight intracranial MR angiography was performed through the [place] [facility] [place] [facility] with MIP reformatting.    COMPARISON:  None.    FINDINGS:  Anterior intracranial circulation: No high-grade focal stenosis, occlusion, aneurysm, or vascular malformation.    Posterior intracranial circulation: No high-grade focal stenosis, occlusion, aneurysm, or vascular malformation.                                       MRI Brain Without Contrast (Final result)  Result time 02/23/24 20:56:39      Final result by Eliz Mcbride MD (02/23/24 20:56:39)                   Impression:      No acute ischemia or other overt acute finding intracranial; sinusitis      Electronically signed by: Eliz Mcbride  Date:    02/23/2024  Time:    20:56               Narrative:    EXAMINATION:  MRI BRAIN WITHOUT CONTRAST    CLINICAL HISTORY:  Transient ischemic attack (TIA);    TECHNIQUE:  Multiplanar multisequence MR imaging of the brain was performed without contrast.    COMPARISON:  None.    CT correlation    FINDINGS:  No acute ischemia seen.    No mass effect or midline shift.    Mild chronic changes.    Sinusitis present                                       US Carotid Bilateral (Final result)  Result time 02/23/24 19:32:38      Final result by Darrell Iglesias MD (02/23/24 19:32:38)                   Impression:      50-69% stenosis on the right and less than 50% stenosis on the left progressed from the prior exam.      Electronically signed by: Darrell Iglesias  Date:    02/23/2024  Time:    19:32               Narrative:    EXAMINATION:  US CAROTID BILATERAL    CLINICAL HISTORY:  TIA/CVA;    TECHNIQUE:  Grayscale and color Doppler ultrasound examination of the carotid and vertebral artery systems bilaterally.  Stenosis estimates are per the NASCET measurement criteria.    COMPARISON:  Multiple priors    FINDINGS:  Right:    Internal  Carotid Artery (ICA) peak systolic velocity 154 cm/sec    ICA/CCA peak systolic velocity ratio: 2.3    Plaque formation: Heterogeneous    Vertebral artery: Antegrade flow and normal waveform.    Left:    Internal Carotid Artery (ICA)  peak systolic velocity 113 cm/sec    ICA/CCA peak systolic velocity ratio: 1.2    Plaque formation: Homogeneous    Vertebral artery: Antegrade flow and normal waveform.                                       CT Head Without Contrast (Final result)  Result time 02/23/24 13:22:57      Final result by Red Puentes MD (02/23/24 13:22:57)                   Impression:      Chronic microvascular ischemic changes.  No intracranial hemorrhage.  [place] stroke programme early CT score (ASPECTS): 10    Debris and suspected fluid level within the left sphenoid sinus and right frontal sinus which could reflect sinusitis.    COMMUNICATION  This critical result was discovered/received at 13:18.  The critical information above was relayed directly by me by secure chat to Dr. Brissa mabry on 02/23/2024 at 13:22 at .      Electronically signed by: Red Puentes MD  Date:    02/23/2024  Time:    13:22               Narrative:    EXAMINATION:  CT HEAD WITHOUT CONTRAST    CLINICAL HISTORY:  Neuro deficit, acute, stroke suspected;    TECHNIQUE:  Low dose axial CT images obtained throughout the head without intravenous contrast. Sagittal and coronal reconstructions were performed.  All CT scans at this facility use dose modulation, iterative reconstruction and/or weight based dosing when appropriate to reduce radiation dose to as low as reasonably achievable.    COMPARISON:  None.    FINDINGS:  Intracranial compartment:    The brain parenchyma demonstrates areas of decreased attenuation with mild to moderate periventricular white matter consistent with chronic microvascular ischemic changes..  No parenchymal mass, hemorrhage, edema or major vascular distribution infarct.  Vascular calcifications are  noted.    Mild prominence of the sulci and ventricles are consistent with age-related involutional changes.    No extra-axial blood or fluid collections.    Skull/extracranial contents (limited evaluation): No fracture.  Debris and suspected fluid level within the left sphenoid sinus and right frontal sinus which could reflect sinusitis.  Patchy ethmoid opacification noted.  Mastoid air cells and paranasal sinuses are essentially clear.                                       X-Ray Chest AP Portable (Final result)  Result time 02/23/24 12:54:13      Final result by Red Puentes MD (02/23/24 12:54:13)                   Impression:      No acute process seen.      Electronically signed by: Red Puentes MD  Date:    02/23/2024  Time:    12:54               Narrative:    EXAMINATION:  XR CHEST AP PORTABLE    CLINICAL HISTORY:  Dyspnea, unspecified    FINDINGS:  Single view of the chest.  Comparison 5/8/23    Cardiac silhouette is normal.  The lungs demonstrate no evidence of active disease.  No evidence of pleural effusion or pneumothorax.  Bones appear intact.  Moderate degenerative changes and moderate atherosclerotic disease.                                       Type of Interpretation: ED Physician (Independently Interpreted).  Radiology Procedure Done: Portable CXR.  Interpretation: No acute abnormality.          The Emergency Provider reviewed the vital signs and test results, which are outlined above.     ED Discussion   ED Medication(s):  Medications   finasteride tablet 5 mg (has no administration in time range)   levothyroxine tablet 300 mcg (300 mcg Oral Not Given 2/23/24 1415)   sodium chloride 0.9% flush 10 mL (has no administration in time range)   melatonin tablet 6 mg (has no administration in time range)   ondansetron injection 4 mg (has no administration in time range)   aluminum-magnesium hydroxide-simethicone 200-200-20 mg/5 mL suspension 30 mL (has no administration in time range)    acetaminophen tablet 650 mg (has no administration in time range)   morphine injection 2 mg (has no administration in time range)   naloxone 0.4 mg/mL injection 0.02 mg (has no administration in time range)   glucose chewable tablet 16 g (has no administration in time range)   glucose chewable tablet 24 g (has no administration in time range)   glucagon (human recombinant) injection 1 mg (has no administration in time range)   insulin aspart U-100 pen 0-10 Units (has no administration in time range)   dextrose 10% bolus 125 mL 125 mL (has no administration in time range)   dextrose 10% bolus 250 mL 250 mL (has no administration in time range)   sodium chloride 0.9% flush 10 mL (has no administration in time range)   labetaloL injection 10 mg (has no administration in time range)   bisacodyL suppository 10 mg (has no administration in time range)   carvediloL tablet 6.25 mg (6.25 mg Oral Given 2/23/24 2127)   ranolazine 12 hr tablet 500 mg (500 mg Oral Given 2/23/24 1631)   0.9%  NaCl infusion (for blood administration) (has no administration in time range)   nitroGLYCERIN SL tablet 0.4 mg (has no administration in time range)   budesonide nebulizer solution 0.5 mg (0.5 mg Nebulization Given 2/23/24 1941)   arformoteroL nebulizer solution 15 mcg (15 mcg Nebulization Given 2/23/24 1941)       ED Course as of 02/24/24 0017   Fri Feb 23, 2024   1232 Hemoglobin(!): 8.2 [LB]   1232 Hematocrit(!): 27.2 [LB]   1259 Troponin I: 0.006 [LB]   1300 Independent review of chest x-ray: Postoperative changes.  No infiltrate [LB]   1305 Discussed with Dr. Aleman (Vascular Neurology):   Patient is not a candidate for TNK.  She does recommends CTA of the head and neck.  Plus minus MRI.  Anticoagulation is dependent on GI as patient has history of GI bleed. [LB]   1308 Secure chat with Dr. Aleman:  As patient has allergy to IV dye, recommends MRI/MRA of head and neck. [LB]   1422 Observation:  Tele,   Dr. Machado. [LB]      ED Course User  Index  [LB] Brissa ManzanaresAzalea, DO       2:24 PM: Discussed case with Dr. Magalis Machado ([facility]). Dr. Machado agrees with current care and management of pt and accepts admission.   Admitting Service: Hospital Medicine  Admitting Physician: Dr. Machado  Admit to: Obs: Tele     2:24 PM: Re-evaluated pt. I have discussed test results, shared treatment plan, and the need for admission with patient and family at bedside. Pt and family express understanding at this time and agree with all information. All questions answered. Pt and family have no further questions or concerns at this time. Pt is ready for admit.        MIPS Measures     Smoker? Former     Hypertension: History of Hypertension: The patient has elevated blood pressure (higher than 120/80) while being treated in the ED but has a history of hypertension.         Medical Decision Making           Additional MDM:     NIH Stroke Scale:   Interval = baseline (upon arrival/admit)  Level of consciousness = 0 - alert  LOC questions = 0 - answers both correctly  LOC commands = 0 - performs both correctly  Best gaze = 0 - normal  Visual = 0 - no visual loss  Facial palsy = 0 - normal  Motor left arm =  0 - no drift  Motor right arm =  0 - no drift  Motor left leg = 0 - no drift  Motor right leg =  0 - no drift  Limb ataxia = 0 - absent  Sensory = 0 - normal  Best language = 0 - no aphasia  Dysarthria = 0 - normal articulation  Extinction and inattention = 0 - no neglect  NIH Stroke Scale Total = 0           Medical Decision Making  Differential diagnosis includes symptomatic anemia, GI bleed, ACS, stroke, atrial fibrillation with    EKG showed normal sinus rhythm.  Rapid ventricular rate.  Patient's hemoglobin/hematocrit 8.2/27.2.  Range of hemoglobin 7 0.2-8.5.  Range of hematocrit 24.5-28.1.  Liver enzymes normal.  Troponin 0.006.  .    Case was discussed with vascular Neurology.  TNK not indicated.  Anticoagulation should be based on  recommendations from Gastroenterology as patient has had recent GI bleed.    CT head shows chronic microvascular ischemic changes.  No acute hemorrhage.  Patient has allergy to IV dye.  Patient underwent carotid ultrasound:  50-69 % stenosis on the right as less than 50% stenosis on the left.          Amount and/or Complexity of Data Reviewed  External Data Reviewed: labs.  Labs: ordered. Decision-making details documented in ED Course.  Radiology: ordered and independent interpretation performed. Decision-making details documented in ED Course.  ECG/medicine tests: ordered and independent interpretation performed. Decision-making details documented in ED Course.    Risk  Decision regarding hospitalization.        Coding    Prescription Management: I performed a review of the patient's current Rx medication list as input by nursing staff.    Current Discharge Medication List        CONTINUE these medications which have NOT CHANGED    Details   albuterol (VENTOLIN HFA) 90 mcg/actuation inhaler Inhale 2 puffs into the lungs every 6 (six) hours as needed for Wheezing or Shortness of Breath. Rescue  Qty: 18 g, Refills: 3    Associated Diagnoses: Mild intermittent asthma without complication      amLODIPine (NORVASC) 10 MG tablet TAKE 1 TABLET BY MOUTH ONCE A DAY (DOSE INCREASE)  Qty: 30 tablet, Refills: 11      budesonide-formoterol 160-4.5 mcg (SYMBICORT) 160-4.5 mcg/actuation HFAA INHALE 2 PUFFS INTO THE LUNGS TWO TIMES A DAY.  Qty: 10.2 g, Refills: 11    Associated Diagnoses: Mild intermittent asthma without complication      carvediloL (COREG) 3.125 MG tablet TAKE 1 TABLET BY MOUTH TWICE DAILY WITH FOOD  Qty: 60 tablet, Refills: 5    Comments: This prescription was filled on 2/16/2024. Any refills authorized will be placed on file. - .  Associated Diagnoses: Secondary esophageal varices without bleeding      empagliflozin (JARDIANCE) 25 mg tablet Take 1 tablet (25 mg total) by mouth once daily.  Qty: 90 tablet,  Refills: 1    Associated Diagnoses: Type 2 diabetes mellitus with chronic kidney disease, with long-term current use of insulin, unspecified CKD stage      finasteride (PROSCAR) 5 mg tablet TAKE 1 TABLET BY MOUTH once daily  Qty: 90 tablet, Refills: 2    Associated Diagnoses: Benign prostatic hyperplasia with nocturia      furosemide (LASIX) 40 MG tablet Take 1 tablet (40 mg total) by mouth 2 (two) times daily.  Qty: 60 tablet, Refills: 11      GLUCOSAMINE HCL/CHONDR ROSARIO A NA (OSTEO BI-FLEX ORAL) Take 1 tablet by mouth 2 (two) times daily.       levocetirizine (XYZAL) 5 MG tablet Take 1 tablet (5 mg total) by mouth every evening.  Qty: 30 tablet, Refills: 11    Comments: This prescription was filled on 11/16/2021. Any refills authorized will be placed on file.  Associated Diagnoses: Chronic pansinusitis      levothyroxine (SYNTHROID) 300 MCG Tab TAKE 1 TABLET BY MOUTH EVERY MORNING  Qty: 90 tablet, Refills: 3    Comments: This prescription was filled on 2/8/2024. Any refills authorized will be placed on file.  Associated Diagnoses: Acquired hypothyroidism      lisinopriL 10 MG tablet TAKE 1 BY MOUTH EVERY DAY  Qty: 30 tablet, Refills: 7    Comments: This prescription was filled on 11/29/2023. Any refills authorized will be placed on file. - .      metFORMIN (GLUCOPHAGE-XR) 500 MG ER 24hr tablet Take 1 tablet (500 mg total) by mouth 2 (two) times daily with meals.  Qty: 180 tablet, Refills: 3      montelukast (SINGULAIR) 10 mg tablet Take 1 tablet (10 mg total) by mouth once daily.  Qty: 90 tablet, Refills: 3    Comments: This prescription was filled on 12/13/2022. Any refills authorized will be placed on file.  Associated Diagnoses: Chronic pansinusitis      propafenone (RHTHYMOL) 150 MG Tab Take 1 tablet (150 mg total) by mouth every 8 (eight) hours.  Qty: 90 tablet, Refills: 11      RABEprazole (ACIPHEX) 20 mg tablet Take 1 tablet (20 mg total) by mouth 2 (two) times daily.  Qty: 180 tablet, Refills: 3    Comments:  "DISCONTINUE any prescription for this drug issued prior to 2021. Known to tolerate Aciphex.  Associated Diagnoses: Gastroesophageal reflux disease without esophagitis      ACCU-CHEK GRACE PLUS METER Misc AS DIRECTED  Qty: 1 each, Refills: 0    Associated Diagnoses: Type 2 diabetes mellitus with microalbuminuria, with long-term current use of insulin      blood sugar diagnostic (ACCU-CHEK GRACE PLUS TEST STRP) Strp TEST THREE TIMES A DAY  Qty: 100 strip, Refills: 11      insulin (LANTUS SOLOSTAR U-100 INSULIN) glargine 100 units/mL SubQ pen Inject 44 Units into the skin every evening.  Qty: 45 mL, Refills: 1    Comments: This prescription was filled on 2023. Any refills authorized will be placed on file.  Associated Diagnoses: Type 2 diabetes mellitus with microalbuminuria, with long-term current use of insulin      lancets (ACCU-CHEK FASTCLIX LANCET DRUM) Misc TEST TWO TIMES A DAY  Qty: 200 each, Refills: 5      pen needle, diabetic (BD ULTRA-FINE MINI PEN NEEDLE) 31 gauge x 3/16" Ndle USE AS DIRECTED  Qty: 100 each, Refills: 11    Comments: DISCONTINUE any prescription for this drug issued prior to 2021.  Associated Diagnoses: Type 2 diabetes mellitus with microalbuminuria, with long-term current use of insulin      REPATHA SURECLICK 140 mg/mL PnIj SMARTSI Milligram(s) Topical Every 2 Weeks      sildenafiL (VIAGRA) 100 MG tablet Take 1/2 to 1 tablet by mouth one hour prior to intercourse. Max 100mg per day  Qty: 30 tablet, Refills: 11              Portions of this note may have been created with voice recognition software. Occasional "wrong-word" or "sound-a-like" substitutions may have occurred due to the inherent limitations of voice recognition software. Please, read the note carefully and recognize, using context, where substitutions have occurred.          Clinical Impression       ICD-10-CM ICD-9-CM   1. Left leg paresthesias  R20.2 782.0   2. SOB (shortness of breath)  R06.02 786.05 "   3. Dyspnea  R06.00 786.09   4. Chest pain  R07.9 786.50   5. PAF (paroxysmal atrial fibrillation)  I48.0 427.31   6. Anemia, unspecified type  D64.9 285.9         ED Disposition     Disposition: Admit to telemetry  Patient condition: Fair      Scribe Attestation:   Scribe #1: I performed the above scribed service and the documentation accurately describes the services I performed. I attest to the accuracy of the note.     Attending:   Physician Attestation Statement for Scribe #1: I, Brissa Manzanares, , personally performed the services described in this documentation, as scribed by Michele Bruce, in my presence, and it is both accurate and complete.                 Brissa Manzanares DO  02/24/24 0017

## 2024-02-23 NOTE — ASSESSMENT & PLAN NOTE
Patient's anemia is currently uncontrolled. Has received 1 units of PRBCs on 2/23/24 . Etiology likely d/t chronic blood loss  Current CBC reviewed-   Lab Results   Component Value Date    HGB 8.2 (L) 02/23/2024    HCT 27.2 (L) 02/23/2024   --Daily CBC  --Transfuse with 1 unit PRBC for Hgb <9.0 due to comorbid cardiac hx

## 2024-02-23 NOTE — H&P
"  [facility] - Emergency Dept.  [facility]  History & Physical    Patient Name: Erendira Pretty  MRN: [id number]  Patient Class: OP- Observation  Admission Date: 2/23/2024  Attending Physician: Magalis Machado MD   Primary Care Provider: Bhupinder Callaway MD         Patient information was obtained from patient, past medical records, and ER records.     Subjective:     Principal Problem:Chest pain    Chief Complaint:   Chief Complaint   Patient presents with    Shortness of Breath     Pt c/o SOB x 1 week, worsening.  Pt had stent placement in January and had a blood transfusion 2 weeks ago.        HPI: 78 y.o. male with a PMHx of open heart surgery, CAD, Afib, DM, anemia, HTN, and HLD. Presented to the ED for chest tightness and left leg numbness which onset this AM. He states he was pulling up weeds in his garden when he began feeling chest tightness that went away after a period of rest. It returned when he walked to the ED from his vehicle. His left leg numbness onset about 1 hour Prior to assessment while he was in the waiting room. He had similar sxs yesterday but not as severe. He reports that he feels exactly how he felt before his open heart surgery. Associated sxs include SOB and CP with exertion. Pt denies any fever, diaphoresis, cough, dysuria, hematochezia, N/V/D, or left leg weakness. He sees Dr. Gallardo (Cardiology). He states that since he has been off blood thinners he has not had any blood in his stool, per review of chart, has been off anti-coagulation since 1/16/24. S/P SMA stent  1/4/24. Per report of ED physician, neurology does not recommend TPA at this time. In the ED, vitals: 177/91, 84, 18, 98.8, 100% RA. Significant Labs" hgb: 8.2, BNP: 283, CT Head: No acute finding. Recommended for MRI/MRA. Patient is a full code. Placed in observation under the care of Hospital Medicine for management of Chest Pain, CVA Ruleout.    Past Medical History:   Diagnosis Date    Anticoagulant long-term " use     Aortic stenosis     Asthma     Benign prostatic hyperplasia with nocturia     Bilateral carotid artery stenosis 07/21/2021    US CAROTID BILATERAL 07/14/2021 IMPRESSION: Atherosclerosis with suspected stenosis greater than 50% at the right proximal ICA visually. Velocities are discordant and appear improved from prior. Atherosclerosis on the left with no evidence of flow-limiting stenosis greater than 50%.  FINDINGS: Right: Internal Carotid Artery (ICA): Peak systolic velocity 118 cm/sec. End diastolic velocity 35 cm/sec    BPH (benign prostatic hyperplasia)     CAD (coronary artery disease)     40% lesion 2002;violet    Cirrhosis     Claudication 04/09/2014    Colon polyp     COPD (chronic obstructive pulmonary disease)     ED (erectile dysfunction)     Encounter for blood transfusion     Ex-smoker     Hearing loss NEC     Hepatitis C     Cured;reji brown 2015    Hyperlipidemia     Hypertension     Hypothyroid     s/p tx graves    Open angle with borderline findings and low glaucoma risk in both eyes 09/22/2020    DELONTE on CPAP     Osteoarthritis     Paroxysmal atrial fibrillation     PVD (peripheral vascular disease)     Secondary esophageal varices without bleeding 06/26/2017    Type 2 diabetes mellitus        Past Surgical History:   Procedure Laterality Date    ANGIOGRAM, EXTREMITY, UNILATERAL Left 1/4/2024    Procedure: ANGIOGRAM, EXTREMITY, UNILATERAL- Femoral / SMS Stent, Poss General;  Surgeon: ALEX Alfred III, MD;  Location: [facility] OR [address] [place] [address];  Service: Vascular;  Laterality: Left;  mGy : 2698.78  Gy.cm : 229.88  Contrast : 62ml  Fluro time : 13.8min    CARDIAC CATHETERIZATION      CARDIAC SURGERY      CARPAL TUNNEL RELEASE Left     CATARACT EXTRACTION      CATARACT EXTRACTION W/ INTRAOCULAR LENS  IMPLANT, BILATERAL  2008    CATHETERIZATION OF BOTH LEFT AND RIGHT HEART N/A 11/2/2018    Procedure: CATHETERIZATION, HEART, BOTH LEFT AND RIGHT;  Surgeon: Frank Gallardo MD;  Location: [facility]  CATH LAB;  Service: Cardiology;  Laterality: N/A;    COLONOSCOPY  8/2013    COLONOSCOPY N/A 5/30/2016    Procedure: COLONOSCOPY;  Surgeon: Anna Tomas MD;  Location: [facility] ENDO;  Service: Endoscopy;  Laterality: N/A;    COLONOSCOPY N/A 7/17/2019    Procedure: COLONOSCOPY;  Surgeon: David Carter III, MD;  Location: [facility] ENDO;  Service: Endoscopy;  Laterality: N/A;    COLONOSCOPY N/A 12/14/2023    Procedure: COLONOSCOPY;  Surgeon: Ama Barrera MD;  Location: [facility] ENDO;  Service: Endoscopy;  Laterality: N/A;    COMPUTED TOMOGRAPHY N/A 9/9/2019    Procedure: CT (COMPUTED TOMOGRAPHY);  Surgeon: [facility] Provider;  Location: [facility] OR;  Service: General;  Laterality: N/A;  Microwave ablation to be done in CT.  Need CRNA and cart    COMPUTED TOMOGRAPHY N/A 1/25/2022    Procedure: Liver Microwave Ablation;  Surgeon: Red Puentes MD;  Location: [facility];  Service: General;  Laterality: N/A;    CORONARY ARTERY BYPASS GRAFT (CABG) N/A 11/5/2018    Procedure: CORONARY ARTERY BYPASS GRAFT (CABG);  Surgeon: Bear De Luna MD;  Location: [facility];  Service: Cardiothoracic;  Laterality: N/A;  TWO VESSEL BYPASS WITH AORTOTOMY AND EXCISION OF ATHEROSCLEROTIC PLAQUE    CORONARY BYPASS GRAFT ANGIOGRAPHY  3/2/2020    Procedure: Bypass graft study;  Surgeon: Cora Cormier MD;  Location: [facility] CATH LAB;  Service: Cardiology;;    ELBOW BURSA SURGERY Right 2010    ENDOSCOPIC HARVEST OF VEIN Left 11/5/2018    Procedure: SURGICAL PROCUREMENT, VEIN, ENDOSCOPIC;  Surgeon: Bear De Luna MD;  Location: [facility] OR;  Service: Cardiothoracic;  Laterality: Left;    ESOPHAGOGASTRODUODENOSCOPY N/A 7/17/2019    Procedure: ESOPHAGOGASTRODUODENOSCOPY (EGD);  Surgeon: David Carter III, MD;  Location: [facility];  Service: Endoscopy;  Laterality: N/A;    ESOPHAGOGASTRODUODENOSCOPY N/A 12/29/2022    Procedure: ESOPHAGOGASTRODUODENOSCOPY (EGD);  Surgeon: Issa Gayle MD;  Location: [facility];  Service: Endoscopy;   Laterality: N/A;    ESOPHAGOGASTRODUODENOSCOPY N/A 1/17/2024    Procedure: EGD (ESOPHAGOGASTRODUODENOSCOPY);  Surgeon: Issa Gayle MD;  Location: [facility];  Service: Endoscopy;  Laterality: N/A;    ETHMOIDECTOMY Bilateral 3/27/2019    Procedure: ETHMOIDECTOMY;  Surgeon: Alejandro Parkinson MD;  Location: [facility] OR;  Service: ENT;  Laterality: Bilateral;    EYE SURGERY      FOOT SURGERY      FRONTAL SINUS OBLITERATION Bilateral 3/27/2019    Procedure: SINUSOTOMY, FRONTAL SINUS, OBLITERATIVE;  Surgeon: Alejandro Parkinson MD;  Location: [facility] OR;  Service: ENT;  Laterality: Bilateral;    FUNCTIONAL ENDOSCOPIC SINUS SURGERY (FESS) Bilateral 3/27/2019    Procedure: FESS (FUNCTIONAL ENDOSCOPIC SINUS SURGERY);  Surgeon: Alejandro Parkinson MD;  Location: [facility] OR;  Service: ENT;  Laterality: Bilateral;  Left Keila bullosa resection     INTRALUMINAL GASTROINTESTINAL TRACT IMAGING VIA CAPSULE N/A 2/2/2024    Procedure: IMAGING PROCEDURE, GI TRACT, INTRALUMINAL, VIA CAPSULE;  Surgeon: Cooper Estrella RN;  Location: [facility];  Service: Endoscopy;  Laterality: N/A;    KNEE ARTHROSCOPY W/ MENISCAL REPAIR Left 06/2019    dr boykin    KNEE SURGERY Right     LEFT HEART CATHETERIZATION Left 3/2/2020    Procedure: CATHETERIZATION, HEART, LEFT;  Surgeon: Cora Cormier MD;  Location: [facility] CATH LAB;  Service: Cardiology;  Laterality: Left;    LIVER BIOPSY  01/2022    MAXILLARY ANTROSTOMY Bilateral 3/27/2019    Procedure: MAXILLARY ANTROSTOMY;  Surgeon: Alejandro Parkinson MD;  Location: [facility] OR;  Service: ENT;  Laterality: Bilateral;    NASAL SEPTOPLASTY N/A 3/27/2019    Procedure: SEPTOPLASTY, NOSE;  Surgeon: Alejandro Parkinson MD;  Location: [facility] OR;  Service: ENT;  Laterality: N/A;    RECTAL SURGERY  2012    STENT, SUPERIOR MESENTERIC ARTERY Left 1/4/2024    Procedure: STENT, SUPERIOR MESENTERIC ARTERY;  Surgeon: ALEX Alfred III, MD;  Location: [address];  Service: Vascular;  Laterality: Left;    TRANSURETHRAL RESECTION OF PROSTATE (TURP) WITHOUT USE OF  LASER N/A 6/19/2018    Procedure: TURP, WITHOUT USING LASER;  Surgeon: Cooper Cordova IV, MD;  Location: [facility];  Service: Urology;  Laterality: N/A;    TRIGGER FINGER RELEASE Left        Review of patient's allergies indicates:   Allergen Reactions    Lipitor [atorvastatin] Other (See Comments)     Muscle aches and pains as well as fatigue.     Niacin preparations Other (See Comments)     Causes broken blood vessels and bruises at 4 times normal dose.    Statins-hmg-coa reductase inhibitors Other (See Comments)     Statins cause intolerable myalgias.    Iodinated contrast media Hives     Tachycardia    Omeprazole Hives and Itching    Prilosec [omeprazole magnesium] Hives and Itching       Current Facility-Administered Medications on File Prior to Encounter   Medication    lactated ringers infusion     Current Outpatient Medications on File Prior to Encounter   Medication Sig    ACCU-CHEK GRACE PLUS METER Misc AS DIRECTED    albuterol (VENTOLIN HFA) 90 mcg/actuation inhaler Inhale 2 puffs into the lungs every 6 (six) hours as needed for Wheezing or Shortness of Breath. Rescue    amLODIPine (NORVASC) 10 MG tablet TAKE 1 TABLET BY MOUTH ONCE A DAY (DOSE INCREASE) (Patient taking differently: Take by mouth every morning.)    blood sugar diagnostic (ACCU-CHEK GRACE PLUS TEST STRP) Strp TEST THREE TIMES A DAY    budesonide-formoterol 160-4.5 mcg (SYMBICORT) 160-4.5 mcg/actuation HFAA INHALE 2 PUFFS INTO THE LUNGS TWO TIMES A DAY.    carvediloL (COREG) 3.125 MG tablet TAKE 1 TABLET BY MOUTH TWICE DAILY WITH FOOD    empagliflozin (JARDIANCE) 25 mg tablet Take 1 tablet (25 mg total) by mouth once daily. (Patient taking differently: Take 25 mg by mouth every morning.)    finasteride (PROSCAR) 5 mg tablet TAKE 1 TABLET BY MOUTH once daily    furosemide (LASIX) 40 MG tablet Take 1 tablet (40 mg total) by mouth 2 (two) times daily.    GLUCOSAMINE HCL/CHONDR ROSARIO A NA (OSTEO BI-FLEX ORAL) Take 1 tablet by mouth 2 (two) times  "daily.     insulin (LANTUS SOLOSTAR U-100 INSULIN) glargine 100 units/mL SubQ pen Inject 44 Units into the skin every evening.    ipratropium (ATROVENT) 21 mcg (0.03 %) nasal spray 2 sprays by Each Nostril route 2 (two) times daily.    lancets (ACCU-CHEK FASTCLIX LANCET DRUM) Misc TEST TWO TIMES A DAY    levocetirizine (XYZAL) 5 MG tablet Take 1 tablet (5 mg total) by mouth every evening.    levothyroxine (SYNTHROID) 300 MCG Tab TAKE 1 TABLET BY MOUTH EVERY MORNING    lisinopriL 10 MG tablet TAKE 1 BY MOUTH EVERY DAY (Patient taking differently: Take 10 mg by mouth every morning.)    metFORMIN (GLUCOPHAGE-XR) 500 MG ER 24hr tablet Take 1 tablet (500 mg total) by mouth 2 (two) times daily with meals.    montelukast (SINGULAIR) 10 mg tablet Take 1 tablet (10 mg total) by mouth once daily.    pen needle, diabetic (BD ULTRA-FINE MINI PEN NEEDLE) 31 gauge x 3/16" Ndle USE AS DIRECTED    propafenone (RHTHYMOL) 150 MG Tab Take 1 tablet (150 mg total) by mouth every 8 (eight) hours.    RABEprazole (ACIPHEX) 20 mg tablet Take 1 tablet (20 mg total) by mouth 2 (two) times daily.    REPATHA SURECLICK 140 mg/mL PnIj SMARTSI Milligram(s) Topical Every 2 Weeks    sildenafiL (VIAGRA) 100 MG tablet Take 1/2 to 1 tablet by mouth one hour prior to intercourse. Max 100mg per day     Family History       Problem Relation (Age of Onset)    Heart disease Mother, Father, Brother          Tobacco Use    Smoking status: Former     Current packs/day: 0.00     Average packs/day: 0.5 packs/day for 4.0 years (2.0 ttl pk-yrs)     Types: Cigarettes     Start date: 1968     Quit date: 1972     Years since quittin.7    Smokeless tobacco: Former     Types: Chew     Quit date: 1976   Substance and Sexual Activity    Alcohol use: Yes     Alcohol/week: 2.0 standard drinks of alcohol     Types: 2 Cans of beer per week    Drug use: No    Sexual activity: Yes     Partners: Female     Review of Systems   Respiratory:  Positive for " chest tightness and shortness of breath.    Neurological:  Positive for numbness.   All other systems reviewed and are negative.    Objective:     Vital Signs (Most Recent):  Temp: 98.8 °F (37.1 °C) (02/23/24 1039)  Pulse: 78 (02/23/24 1402)  Resp: 18 (02/23/24 1250)  BP: (!) 162/76 (02/23/24 1402)  SpO2: 99 % (02/23/24 1402) Vital Signs (24h Range):  Temp:  [98.8 °F (37.1 °C)] 98.8 °F (37.1 °C)  Pulse:  [78-88] 78  Resp:  [17-18] 18  SpO2:  [99 %-100 %] 99 %  BP: (162-184)/(76-91) 162/76     Weight: 102.7 kg (226 lb 6.6 oz)  Body mass index is 35.46 kg/m².     Physical Exam  Vitals and nursing note reviewed.   Constitutional:       General: He is not in acute distress.     Appearance: He is well-developed. He is not diaphoretic.   HENT:      Head: Normocephalic and atraumatic.      Right Ear: Hearing and external ear normal.      Left Ear: Hearing and external ear normal.      Nose: Nose normal. No mucosal edema or rhinorrhea.      Mouth/Throat:      Pharynx: Uvula midline.   Eyes:      General:         Right eye: No discharge.         Left eye: No discharge.      Conjunctiva/sclera: Conjunctivae normal.      Right eye: No chemosis.     Left eye: No chemosis.     Pupils: Pupils are equal, round, and reactive to light.   Neck:      Thyroid: No thyroid mass or thyromegaly.      Trachea: Trachea normal.   Cardiovascular:      Rate and Rhythm: Normal rate and regular rhythm.      Pulses:           Dorsalis pedis pulses are 2+ on the right side and 2+ on the left side.      Heart sounds: Normal heart sounds. No murmur heard.  Pulmonary:      Effort: Pulmonary effort is normal. No respiratory distress.      Breath sounds: Normal breath sounds. No decreased breath sounds or wheezing.   Abdominal:      General: Bowel sounds are normal. There is no distension.      Palpations: Abdomen is soft.      Tenderness: There is no abdominal tenderness.   Musculoskeletal:         General: Normal range of motion.      Cervical back:  Normal range of motion and neck supple.   Lymphadenopathy:      Cervical: No cervical adenopathy.      Upper Body:      Right upper body: No supraclavicular adenopathy.      Left upper body: No supraclavicular adenopathy.   Skin:     General: Skin is warm and dry.      Capillary Refill: Capillary refill takes less than 2 seconds.      Findings: No rash.   Neurological:      Mental Status: He is alert and oriented to person, place, and time.   Psychiatric:         Mood and Affect: Mood is not anxious.         Speech: Speech normal.         Behavior: Behavior normal.         Thought Content: Thought content normal.         Judgment: Judgment normal.              CRANIAL NERVES     CN III, IV, VI   Pupils are equal, round, and reactive to light.       Significant Labs: All pertinent labs within the past 24 hours have been reviewed.  CBC:   Recent Labs   Lab 02/23/24  1223   WBC 4.40   HGB 8.2*   HCT 27.2*        CMP:   Recent Labs   Lab 02/23/24  1223      K 4.0      CO2 25      BUN 15   CREATININE 1.0   CALCIUM 9.2   PROT 7.3   ALBUMIN 3.7   BILITOT 0.4   ALKPHOS 76   AST 19   ALT 15   ANIONGAP 11     Cardiac Markers:   Recent Labs   Lab 02/23/24  1223   *     Troponin:   Recent Labs   Lab 02/23/24  1223   TROPONINI 0.006       Significant Imaging: I have reviewed all pertinent imaging results/findings within the past 24 hours.  Assessment/Plan:     * Chest pain  Chest pain intermittent, similar presentation when he was taken for CABG. Hx of CABG x2. Recently AC held due to GI bleed.     --trend Trop  --Tele  --Vitals Q4H  --EKG PRN Chest Pain      Anemia  Patient's anemia is currently uncontrolled. Has received 1 units of PRBCs on 2/23/24 . Etiology likely d/t chronic blood loss  Current CBC reviewed-   Lab Results   Component Value Date    HGB 8.2 (L) 02/23/2024    HCT 27.2 (L) 02/23/2024   --Daily CBC  --Transfuse with 1 unit PRBC for Hgb <9.0 due to comorbid cardiac hx     Episode  of transient neurologic symptoms  CVA/TIA rule out, symptoms have resolved. Code stroke called, Tele-Neuro recommended CTA head/neck, contraindicated due to contrast allergy. MRI/MRA brain recommended.     --MRI w/out Brain, MRA Brain  --US carotid bilateral  --Neurochecks q4h  --Permissive HTN, Lopressor IV for BP >220/110  --Tele  --PT/OT/SLP        Left leg numbness  --Resolved    Anticoagulants causing adverse effect in therapeutic use  Recent GI Bleed, GI evaluated, no significant source. AC held.     --Hold AC  --Trend Hgb      Occlusion of superior mesenteric artery  S/P stent placement 1/4/24          Paroxysmal atrial fibrillation  Hx of A. Fib, negative for A. Fib while in ED    --Tele  -Repeat EKG in AM    Type 2 diabetes mellitus with microalbuminuria, with long-term current use of insulin  Patient's FSGs are controlled on current medication regimen.  Last A1c reviewed-   Lab Results   Component Value Date    HGBA1C 6.3 (H) 10/09/2023     Most recent fingerstick glucose reviewed-   Recent Labs   Lab 02/23/24  1254   POCTGLUCOSE 99     Current correctional scale  Medium  Maintain anti-hyperglycemic dose as follows-   Antihyperglycemics (From admission, onward)      Start     Stop Route Frequency Ordered    02/23/24 1508  insulin aspart U-100 pen 0-10 Units         -- SubQ Before meals & nightly PRN 02/23/24 1409          Hold Oral hypoglycemics while patient is in the hospital.       Exertional angina  Reported intermittent CP at home, unable to be treated with AC at this time due to GI bleed. Evaluated by cardiology, not a candidate for heart cath at this time due to anemia and inability to treat with DAPT. Manage medically.    --NTG SL PRN  --Tele  --Repeat EKG in AM  --Hgb goal >9.0      Nonrheumatic aortic valve stenosis  --Followed by cardiology      Essential hypertension  Chronic, uncontrolled. Latest blood pressure and vitals reviewed-     Temp:  [98.8 °F (37.1 °C)]   Pulse:  [78-88]   Resp:   [17-18]   BP: (162-184)/(76-91)   SpO2:  [99 %-100 %] .   Home meds for hypertension were reviewed and noted below.   Hypertension Medications               amLODIPine (NORVASC) 10 MG tablet TAKE 1 TABLET BY MOUTH ONCE A DAY (DOSE INCREASE)    carvediloL (COREG) 3.125 MG tablet TAKE 1 TABLET BY MOUTH TWICE DAILY WITH FOOD    furosemide (LASIX) 40 MG tablet Take 1 tablet (40 mg total) by mouth 2 (two) times daily.    lisinopriL 10 MG tablet TAKE 1 BY MOUTH EVERY DAY            While in the hospital, will manage blood pressure as follows; Adjust home antihypertensive regimen as follows- hold for permissive HTN    Will utilize p.r.n. blood pressure medication only if patient's blood pressure greater than 220/110 and he develops symptoms such as worsening chest pain or shortness of breath.      VTE Risk Mitigation (From admission, onward)           Ordered     Reason for No Pharmacological VTE Prophylaxis  Once        Question:  Reasons:  Answer:  Risk of Bleeding    02/23/24 1409     IP VTE HIGH RISK PATIENT  Once         02/23/24 1409     Place sequential compression device  Until discontinued         02/23/24 1409                         On 02/23/2024, patient should be placed in hospital observation services under my care in collaboration with Magalis Machado MD.           Radha King NP  Department of Hospital Medicine  [facility] - Emergency Dept.

## 2024-02-23 NOTE — ASSESSMENT & PLAN NOTE
Chronic, uncontrolled. Latest blood pressure and vitals reviewed-     Temp:  [98.8 °F (37.1 °C)]   Pulse:  [78-88]   Resp:  [17-18]   BP: (162-184)/(76-91)   SpO2:  [99 %-100 %] .   Home meds for hypertension were reviewed and noted below.   Hypertension Medications               amLODIPine (NORVASC) 10 MG tablet TAKE 1 TABLET BY MOUTH ONCE A DAY (DOSE INCREASE)    carvediloL (COREG) 3.125 MG tablet TAKE 1 TABLET BY MOUTH TWICE DAILY WITH FOOD    furosemide (LASIX) 40 MG tablet Take 1 tablet (40 mg total) by mouth 2 (two) times daily.    lisinopriL 10 MG tablet TAKE 1 BY MOUTH EVERY DAY            While in the hospital, will manage blood pressure as follows; Adjust home antihypertensive regimen as follows- hold for permissive HTN    Will utilize p.r.n. blood pressure medication only if patient's blood pressure greater than 220/110 and he develops symptoms such as worsening chest pain or shortness of breath.

## 2024-02-23 NOTE — HPI
"78 y.o. male with a PMHx of open heart surgery, CAD, Afib, DM, anemia, HTN, and HLD. Presented to the ED for chest tightness and left leg numbness which onset this AM. He states he was pulling up weeds in his garden when he began feeling chest tightness that went away after a period of rest. It returned when he walked to the ED from his vehicle. His left leg numbness onset about 1 hour Prior to assessment while he was in the waiting room. He had similar sxs yesterday but not as severe. He reports that he feels exactly how he felt before his open heart surgery. Associated sxs include SOB and CP with exertion. Pt denies any fever, diaphoresis, cough, dysuria, hematochezia, N/V/D, or left leg weakness. He sees Dr. Gallardo (Cardiology). He states that since he has been off blood thinners he has not had any blood in his stool, per review of chart, has been off anti-coagulation since 1/16/24. S/P SMA stent  1/4/24. Per report of ED physician, neurology does not recommend TPA at this time. In the ED, vitals: 177/91, 84, 18, 98.8, 100% RA. Significant Labs" hgb: 8.2, BNP: 283, CT Head: No acute finding. Recommended for MRI/MRA. Patient is a full code. Placed in observation under the care of Hospital Medicine for management of Chest Pain, CVA Ruleout.  "

## 2024-02-23 NOTE — ASSESSMENT & PLAN NOTE
Chest pain intermittent, similar presentation when he was taken for CABG. Hx of CABG x2. Recently AC held due to GI bleed.     --trend Trop  --Tele  --Vitals Q4H  --EKG PRN Chest Pain

## 2024-02-23 NOTE — SUBJECTIVE & OBJECTIVE
Past Medical History:   Diagnosis Date    Anticoagulant long-term use     Aortic stenosis     Asthma     Benign prostatic hyperplasia with nocturia     Bilateral carotid artery stenosis 07/21/2021    US CAROTID BILATERAL 07/14/2021 IMPRESSION: Atherosclerosis with suspected stenosis greater than 50% at the right proximal ICA visually. Velocities are discordant and appear improved from prior. Atherosclerosis on the left with no evidence of flow-limiting stenosis greater than 50%.  FINDINGS: Right: Internal Carotid Artery (ICA): Peak systolic velocity 118 cm/sec. 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 09/22/2020    DELONTE on CPAP     Osteoarthritis     Paroxysmal atrial fibrillation     PVD (peripheral vascular disease)     Secondary esophageal varices without bleeding 06/26/2017    Type 2 diabetes mellitus        Past Surgical History:   Procedure Laterality Date    ANGIOGRAM, EXTREMITY, UNILATERAL Left 1/4/2024    Procedure: ANGIOGRAM, EXTREMITY, UNILATERAL- Femoral / SMS Stent, Poss General;  Surgeon: ALEX Alfred III, MD;  Location: [facility] OR [address] [place] [address];  Service: Vascular;  Laterality: Left;  mGy : 2698.78  Gy.cm : 229.88  Contrast : 62ml  Fluro time : 13.8min    CARDIAC CATHETERIZATION      CARDIAC SURGERY      CARPAL TUNNEL RELEASE Left     CATARACT EXTRACTION      CATARACT EXTRACTION W/ INTRAOCULAR LENS  IMPLANT, BILATERAL  2008    CATHETERIZATION OF BOTH LEFT AND RIGHT HEART N/A 11/2/2018    Procedure: CATHETERIZATION, HEART, BOTH  LEFT AND RIGHT;  Surgeon: Frank Gallardo MD;  Location: [facility] CATH LAB;  Service: Cardiology;  Laterality: N/A;    COLONOSCOPY  8/2013    COLONOSCOPY N/A 5/30/2016    Procedure: COLONOSCOPY;  Surgeon: Anna Tomas MD;  Location: [facility] ENDO;  Service: Endoscopy;  Laterality: N/A;    COLONOSCOPY N/A 7/17/2019    Procedure: COLONOSCOPY;  Surgeon: David Carter III, MD;  Location: [facility] ENDO;  Service: Endoscopy;  Laterality: N/A;    COLONOSCOPY N/A 12/14/2023    Procedure: COLONOSCOPY;  Surgeon: Ama Barrera MD;  Location: [facility] ENDO;  Service: Endoscopy;  Laterality: N/A;    COMPUTED TOMOGRAPHY N/A 9/9/2019    Procedure: CT (COMPUTED TOMOGRAPHY);  Surgeon: [facility] Diagnostic Provider;  Location: [facility];  Service: General;  Laterality: N/A;  Microwave ablation to be done in CT.  Need CRNA and cart    COMPUTED TOMOGRAPHY N/A 1/25/2022    Procedure: Liver Microwave Ablation;  Surgeon: Red Puentes MD;  Location: [facility];  Service: General;  Laterality: N/A;    CORONARY ARTERY BYPASS GRAFT (CABG) N/A 11/5/2018    Procedure: CORONARY ARTERY BYPASS GRAFT (CABG);  Surgeon: Bear De Luna MD;  Location: [facility];  Service: Cardiothoracic;  Laterality: N/A;  TWO VESSEL BYPASS WITH AORTOTOMY AND EXCISION OF ATHEROSCLEROTIC PLAQUE    CORONARY BYPASS GRAFT ANGIOGRAPHY  3/2/2020    Procedure: Bypass graft study;  Surgeon: Cora Cormier MD;  Location: [facility] CATH LAB;  Service: Cardiology;;    ELBOW BURSA SURGERY Right 2010    ENDOSCOPIC HARVEST OF VEIN Left 11/5/2018    Procedure: SURGICAL PROCUREMENT, VEIN, ENDOSCOPIC;  Surgeon: Bear De Luna MD;  Location: [facility];  Service: Cardiothoracic;  Laterality: Left;    ESOPHAGOGASTRODUODENOSCOPY N/A 7/17/2019    Procedure: ESOPHAGOGASTRODUODENOSCOPY (EGD);  Surgeon: David Carter III, MD;  Location: [facility];  Service: Endoscopy;  Laterality: N/A;    ESOPHAGOGASTRODUODENOSCOPY N/A 12/29/2022    Procedure: ESOPHAGOGASTRODUODENOSCOPY (EGD);  Surgeon: Issa CARLSON  MD Irwin;  Location: [facility] ENDO;  Service: Endoscopy;  Laterality: N/A;    ESOPHAGOGASTRODUODENOSCOPY N/A 1/17/2024    Procedure: EGD (ESOPHAGOGASTRODUODENOSCOPY);  Surgeon: Issa Gayle MD;  Location: [facility] ENDO;  Service: Endoscopy;  Laterality: N/A;    ETHMOIDECTOMY Bilateral 3/27/2019    Procedure: ETHMOIDECTOMY;  Surgeon: Alejandro Parkinson MD;  Location: [facility] OR;  Service: ENT;  Laterality: Bilateral;    EYE SURGERY      FOOT SURGERY      FRONTAL SINUS OBLITERATION Bilateral 3/27/2019    Procedure: SINUSOTOMY, FRONTAL SINUS, OBLITERATIVE;  Surgeon: Alejandro Parkinson MD;  Location: [facility] OR;  Service: ENT;  Laterality: Bilateral;    FUNCTIONAL ENDOSCOPIC SINUS SURGERY (FESS) Bilateral 3/27/2019    Procedure: FESS (FUNCTIONAL ENDOSCOPIC SINUS SURGERY);  Surgeon: Alejandro Parkinson MD;  Location: [facility] OR;  Service: ENT;  Laterality: Bilateral;  Left Keila bullosa resection     INTRALUMINAL GASTROINTESTINAL TRACT IMAGING VIA CAPSULE N/A 2/2/2024    Procedure: IMAGING PROCEDURE, GI TRACT, INTRALUMINAL, VIA CAPSULE;  Surgeon: Cooper Estrella RN;  Location: [facility];  Service: Endoscopy;  Laterality: N/A;    KNEE ARTHROSCOPY W/ MENISCAL REPAIR Left 06/2019    dr boykin    KNEE SURGERY Right     LEFT HEART CATHETERIZATION Left 3/2/2020    Procedure: CATHETERIZATION, HEART, LEFT;  Surgeon: Cora Cormier MD;  Location: [facility] CATH LAB;  Service: Cardiology;  Laterality: Left;    LIVER BIOPSY  01/2022    MAXILLARY ANTROSTOMY Bilateral 3/27/2019    Procedure: MAXILLARY ANTROSTOMY;  Surgeon: Alejandro Parkinson MD;  Location: [facility] OR;  Service: ENT;  Laterality: Bilateral;    NASAL SEPTOPLASTY N/A 3/27/2019    Procedure: SEPTOPLASTY, NOSE;  Surgeon: Alejandro Parkinson MD;  Location: [facility] OR;  Service: ENT;  Laterality: N/A;    RECTAL SURGERY  2012    STENT, SUPERIOR MESENTERIC ARTERY Left 1/4/2024    Procedure: STENT, SUPERIOR MESENTERIC ARTERY;  Surgeon: ALEX Alfred III, MD;  Location: [address];  Service: Vascular;  Laterality: Left;     TRANSURETHRAL RESECTION OF PROSTATE (TURP) WITHOUT USE OF LASER N/A 6/19/2018    Procedure: TURP, WITHOUT USING LASER;  Surgeon: Cooper Cordova IV, MD;  Location: [facility];  Service: Urology;  Laterality: N/A;    TRIGGER FINGER RELEASE Left        Review of patient's allergies indicates:   Allergen Reactions    Lipitor [atorvastatin] Other (See Comments)     Muscle aches and pains as well as fatigue.     Niacin preparations Other (See Comments)     Causes broken blood vessels and bruises at 4 times normal dose.    Statins-hmg-coa reductase inhibitors Other (See Comments)     Statins cause intolerable myalgias.    Iodinated contrast media Hives     Tachycardia    Omeprazole Hives and Itching    Prilosec [omeprazole magnesium] Hives and Itching       Current Facility-Administered Medications on File Prior to Encounter   Medication    lactated ringers infusion     Current Outpatient Medications on File Prior to Encounter   Medication Sig    ACCU-CHEK GRACE PLUS METER Misc AS DIRECTED    albuterol (VENTOLIN HFA) 90 mcg/actuation inhaler Inhale 2 puffs into the lungs every 6 (six) hours as needed for Wheezing or Shortness of Breath. Rescue    amLODIPine (NORVASC) 10 MG tablet TAKE 1 TABLET BY MOUTH ONCE A DAY (DOSE INCREASE) (Patient taking differently: Take by mouth every morning.)    blood sugar diagnostic (ACCU-CHEK GRACE PLUS TEST STRP) Strp TEST THREE TIMES A DAY    budesonide-formoterol 160-4.5 mcg (SYMBICORT) 160-4.5 mcg/actuation HFAA INHALE 2 PUFFS INTO THE LUNGS TWO TIMES A DAY.    carvediloL (COREG) 3.125 MG tablet TAKE 1 TABLET BY MOUTH TWICE DAILY WITH FOOD    empagliflozin (JARDIANCE) 25 mg tablet Take 1 tablet (25 mg total) by mouth once daily. (Patient taking differently: Take 25 mg by mouth every morning.)    finasteride (PROSCAR) 5 mg tablet TAKE 1 TABLET BY MOUTH once daily    furosemide (LASIX) 40 MG tablet Take 1 tablet (40 mg total) by mouth 2 (two) times daily.    GLUCOSAMINE HCL/CHONDR ROSARIO A NA  "(OSTEO BI-FLEX ORAL) Take 1 tablet by mouth 2 (two) times daily.     insulin (LANTUS SOLOSTAR U-100 INSULIN) glargine 100 units/mL SubQ pen Inject 44 Units into the skin every evening.    ipratropium (ATROVENT) 21 mcg (0.03 %) nasal spray 2 sprays by Each Nostril route 2 (two) times daily.    lancets (ACCU-CHEK FASTCLIX LANCET DRUM) Misc TEST TWO TIMES A DAY    levocetirizine (XYZAL) 5 MG tablet Take 1 tablet (5 mg total) by mouth every evening.    levothyroxine (SYNTHROID) 300 MCG Tab TAKE 1 TABLET BY MOUTH EVERY MORNING    lisinopriL 10 MG tablet TAKE 1 BY MOUTH EVERY DAY (Patient taking differently: Take 10 mg by mouth every morning.)    metFORMIN (GLUCOPHAGE-XR) 500 MG ER 24hr tablet Take 1 tablet (500 mg total) by mouth 2 (two) times daily with meals.    montelukast (SINGULAIR) 10 mg tablet Take 1 tablet (10 mg total) by mouth once daily.    pen needle, diabetic (BD ULTRA-FINE MINI PEN NEEDLE) 31 gauge x 3/16" Ndle USE AS DIRECTED    propafenone (RHTHYMOL) 150 MG Tab Take 1 tablet (150 mg total) by mouth every 8 (eight) hours.    RABEprazole (ACIPHEX) 20 mg tablet Take 1 tablet (20 mg total) by mouth 2 (two) times daily.    REPATHA SURECLICK 140 mg/mL PnIj SMARTSI Milligram(s) Topical Every 2 Weeks    sildenafiL (VIAGRA) 100 MG tablet Take 1/2 to 1 tablet by mouth one hour prior to intercourse. Max 100mg per day     Family History       Problem Relation (Age of Onset)    Heart disease Mother, Father, Brother          Tobacco Use    Smoking status: Former     Current packs/day: 0.00     Average packs/day: 0.5 packs/day for 4.0 years (2.0 ttl pk-yrs)     Types: Cigarettes     Start date: 1968     Quit date: 1972     Years since quittin.7    Smokeless tobacco: Former     Types: Chew     Quit date: 1976   Substance and Sexual Activity    Alcohol use: Yes     Alcohol/week: 2.0 standard drinks of alcohol     Types: 2 Cans of beer per week    Drug use: No    Sexual activity: Yes     Partners: " Female     Review of Systems   Constitutional: Negative.   HENT: Negative.     Eyes: Negative.    Cardiovascular:  Positive for chest pain and dyspnea on exertion.   Respiratory:  Positive for shortness of breath.    Endocrine: Negative.    Hematologic/Lymphatic: Negative.    Skin: Negative.    Musculoskeletal: Negative.    Gastrointestinal: Negative.    Genitourinary: Negative.    Neurological:  Positive for numbness.   Psychiatric/Behavioral: Negative.     Allergic/Immunologic: Negative.      Objective:     Vital Signs (Most Recent):  Temp: 98.8 °F (37.1 °C) (02/23/24 1039)  Pulse: 78 (02/23/24 1402)  Resp: 18 (02/23/24 1250)  BP: (!) 162/76 (02/23/24 1402)  SpO2: 99 % (02/23/24 1402) Vital Signs (24h Range):  Temp:  [98.8 °F (37.1 °C)] 98.8 °F (37.1 °C)  Pulse:  [78-88] 78  Resp:  [17-18] 18  SpO2:  [99 %-100 %] 99 %  BP: (162-184)/(76-91) 162/76     Weight: 102.5 kg (226 lb)  Body mass index is 35.4 kg/m².    SpO2: 99 %       No intake or output data in the 24 hours ending 02/23/24 1544    Lines/Drains/Airways       Peripheral Intravenous Line  Duration                  Peripheral IV - Single Lumen 02/23/24 1223 20 G Left Antecubital <1 day                     Physical Exam  Vitals and nursing note reviewed.   Constitutional:       Appearance: He is well-developed.   HENT:      Head: Normocephalic.   Neck:      Thyroid: No thyromegaly.      Vascular: Normal carotid pulses. No carotid bruit, hepatojugular reflux or JVD.   Cardiovascular:      Rate and Rhythm: Normal rate and regular rhythm.      Chest Wall: PMI is not displaced.      Pulses:           Radial pulses are 2+ on the right side and 2+ on the left side.      Heart sounds: S1 normal and S2 normal. Heart sounds not distant. No midsystolic click and no opening snap. No murmur heard.     No friction rub. No S3 or S4 sounds.   Pulmonary:      Effort: Pulmonary effort is normal.      Breath sounds: Normal breath sounds. No wheezing or rales.   Abdominal:      " General: Bowel sounds are normal. There is no distension or abdominal bruit.      Palpations: Abdomen is soft. There is no mass.      Tenderness: There is no abdominal tenderness.   Musculoskeletal:      Cervical back: Normal range of motion and neck supple.   Skin:     General: Skin is warm.   Neurological:      Mental Status: He is alert and oriented to person, place, and time.   Psychiatric:         Behavior: Behavior normal.          Significant Labs: ABG: No results for input(s): "PH", "PCO2", "HCO3", "POCSATURATED", "BE" in the last 48 hours., Blood Culture: No results for input(s): "LABBLOO" in the last 48 hours., BMP:   Recent Labs   Lab 02/23/24  1223         K 4.0      CO2 25   BUN 15   CREATININE 1.0   CALCIUM 9.2   , CMP   Recent Labs   Lab 02/23/24  1223      K 4.0      CO2 25      BUN 15   CREATININE 1.0   CALCIUM 9.2   PROT 7.3   ALBUMIN 3.7   BILITOT 0.4   ALKPHOS 76   AST 19   ALT 15   ANIONGAP 11   , CBC   Recent Labs   Lab 02/23/24  1223   WBC 4.40   HGB 8.2*   HCT 27.2*      , INR   Recent Labs   Lab 02/23/24  1324   INR 1.0   , Lipid Panel No results for input(s): "CHOL", "HDL", "LDLCALC", "TRIG", "CHOLHDL" in the last 48 hours., and Troponin   Recent Labs   Lab 02/23/24  1223   TROPONINI 0.006       Results for orders placed during the hospital encounter of 08/28/23    Echo    Interpretation Summary    Left Ventricle: The left ventricle is normal in size. Normal wall thickness. There is mild concentric hypertrophy. Normal wall motion. There is normal systolic function. Ejection fraction by visual approximation is 55%. There is normal diastolic function.    Right Ventricle: Normal right ventricular cavity size. Wall thickness is normal. Right ventricle wall motion  is normal. Systolic function is normal.    Aortic Valve: There is moderate stenosis. Aortic valve area by VTI is 1.06 cm². Aortic valve peak velocity is 3.05 m/s. Mean gradient is 22 mmHg. " The dimensionless index is 0.30. There is mild to moderate aortic regurgitation.    Mitral Valve:  There is mild regurgitation.    Tricuspid Valve: There is mild regurgitation.    Pulmonary Artery: The estimated pulmonary artery systolic pressure is 30 mmHg.      Results for orders placed during the hospital encounter of 08/28/23    Nuclear Stress - Cardiology Interpreted    Interpretation Summary    Normal myocardial perfusion scan. There is no evidence of myocardial ischemia or infarction.    The gated perfusion images showed an ejection fraction of 62% at rest. The gated perfusion images showed an ejection fraction of 72% post stress.    The ECG portion of the study is negative for ischemia.    The patient reported no chest pain during the stress test.    The Baseline ECG reveals atrial fibrillation.

## 2024-02-23 NOTE — TELEMEDICINE CONSULT
[facility]  Vascular Neurology  Comprehensive Stroke Center  TeleVascular Neurology Acute Consultation Note        Consult Information  Consult to Telemedicine - Acute Stroke  Consult performed by: Rona Aleman MD  Consult ordered by: Brissa Jones DO          Consulting Provider: BRISSA JONES   Current Providers  No providers found    Patient Location:  [facility] EMERGENCY DEPARTMENT Emergency Department    Spoke hospital nurse at bedside with patient assisting consultant.  Patient information was obtained from patient.       Stroke Documentation  Acute Stroke Times   Last Known Normal Date: 02/23/24  Last Known Normal Time: 1154  Symptom Onset Date: 02/23/24  Symptom Onset Time: 1154  Stroke Team Called Date: 02/23/24  Stroke Team Called Time: 1255  Stroke Team Arrival Date: 02/23/24  Stroke Team Arrival Time: 1258  CT Interpretation Time: 1306  Thrombolytic Therapy Recommended: No    NIH Scale:  Interval: baseline  1a. Level of Consciousness: 0-->Alert, keenly responsive  1b. LOC Questions: 0-->Answers both questions correctly  1c. LOC Commands: 0-->Performs both tasks correctly  2. Best Gaze: 0-->Normal  3. Visual: 0-->No visual loss  4. Facial Palsy: 0-->Normal symmetrical movements  5a. Motor Arm, Left: 0-->No drift, limb holds 90 (or 45) degrees for full 10 secs  5b. Motor Arm, Right: 0-->No drift, limb holds 90 (or 45) degrees for full 10 secs  6a. Motor Leg, Left: 0-->No drift, leg holds 30 degree position for full 5 secs  6b. Motor Leg, Right: 0-->No drift, leg holds 30 degree position for full 5 secs  7. Limb Ataxia: 0-->Absent  8. Sensory: 0-->Normal, no sensory loss  9. Best Language: 0-->No aphasia, normal  10. Dysarthria: 0-->Normal  11. Extinction and Inattention (formerly Neglect): 0-->No abnormality  Total (NIH Stroke Scale): 0      Modified Crook: Score: 0  [place] Coma Scale:     ABCD2 Score:    [id number] Score:    HAS -BLED Score:    ICH Score:    Hunt & Manning  "Classification:      Blood pressure (!) 162/76, pulse 78, temperature 98.8 °F (37.1 °C), temperature source Oral, resp. rate 18, height 5' 7" (1.702 m), weight 102.5 kg (226 lb), SpO2 99 %.    Van Negative    Medical Decision Making  HPI:  78 y.o. male  with a PMHx significant for paroxysmal atrial fibrillation on xarelto, DM, HTN, CAD, HLD, history of hepatocellular carcinoma status post ablation presenting with transient left leg numbness.     Off of OAC due to recent GIB for the last 2 weeks. He reports that his left leg felt like it went to sleep. Now resolved. Initially presented to the ED with SOB, chest pressure.       Images personally reviewed and interpreted:  Study: Head CT  Study Interpretation: No acute intracranial hemorrhage or large territory infarction.      Assessment and plan:  # Episode of transient neurologic symptoms - overall concern for TIA given his vascular risk factors (atrial fibrillation not on OAC).     Lytics recommendation: Thrombolytic therapy not recommended due to Patient back to neurological baseline  Thrombectomy recommendation: Awaiting CTA results from Spoke for determination   Placement recommendation: pending further studies     - Recommend resuming antiplatelet/anticoagulation medications as soon as safe from a GI standpoint.   - Recommend an expedited CTA head/neck to evaluate for potential symptomatic stenosis/occlusive lesion that might significantly increase risk of recurrent or worsening signs/symptoms.   - Recommend follow-up non-urgent MRI brain without contrast to evaluate for acute ischemia.  - If above studies are abnormal, please load images to teleneurology imaging system and contact us to review.    Please contact us with any further questions or concerns or if patient has any acute neurological changes (new symptoms, worsening deficits).           ROS  Physical Exam  Past Medical History:   Diagnosis Date    Anticoagulant long-term use     Aortic stenosis     " Asthma     Benign prostatic hyperplasia with nocturia     Bilateral carotid artery stenosis 07/21/2021    US CAROTID BILATERAL 07/14/2021 IMPRESSION: Atherosclerosis with suspected stenosis greater than 50% at the right proximal ICA visually. Velocities are discordant and appear improved from prior. Atherosclerosis on the left with no evidence of flow-limiting stenosis greater than 50%.  FINDINGS: Right: Internal Carotid Artery (ICA): Peak systolic velocity 118 cm/sec. End diastolic velocity 35 cm/sec    BPH (benign prostatic hyperplasia)     CAD (coronary artery disease)     40% lesion 2002;violet    Cirrhosis     Claudication 04/09/2014    Colon polyp     COPD (chronic obstructive pulmonary disease)     ED (erectile dysfunction)     Encounter for blood transfusion     Ex-smoker     Hearing loss NEC     Hepatitis C     Cured;reji brown 2015    Hyperlipidemia     Hypertension     Hypothyroid     s/p tx graves    Open angle with borderline findings and low glaucoma risk in both eyes 09/22/2020    DELONTE on CPAP     Osteoarthritis     Paroxysmal atrial fibrillation     PVD (peripheral vascular disease)     Secondary esophageal varices without bleeding 06/26/2017    Type 2 diabetes mellitus      Past Surgical History:   Procedure Laterality Date    ANGIOGRAM, EXTREMITY, UNILATERAL Left 1/4/2024    Procedure: ANGIOGRAM, EXTREMITY, UNILATERAL- Femoral / SMS Stent, Poss General;  Surgeon: ALEX Alfred III, MD;  Location: [facility] OR [address] [place] [address];  Service: Vascular;  Laterality: Left;  mGy : 2698.78  Gy.cm : 229.88  Contrast : 62ml  Fluro time : 13.8min    CARDIAC CATHETERIZATION      CARDIAC SURGERY      CARPAL TUNNEL RELEASE Left     CATARACT EXTRACTION      CATARACT EXTRACTION W/ INTRAOCULAR LENS  IMPLANT, BILATERAL  2008    CATHETERIZATION OF BOTH LEFT AND RIGHT HEART N/A 11/2/2018    Procedure: CATHETERIZATION, HEART, BOTH LEFT AND RIGHT;  Surgeon: Frank Gallardo MD;  Location: [facility] CATH LAB;  Service:  Cardiology;  Laterality: N/A;    COLONOSCOPY  8/2013    COLONOSCOPY N/A 5/30/2016    Procedure: COLONOSCOPY;  Surgeon: Anna Tomas MD;  Location: [facility] ENDO;  Service: Endoscopy;  Laterality: N/A;    COLONOSCOPY N/A 7/17/2019    Procedure: COLONOSCOPY;  Surgeon: David Carter III, MD;  Location: [facility] ENDO;  Service: Endoscopy;  Laterality: N/A;    COLONOSCOPY N/A 12/14/2023    Procedure: COLONOSCOPY;  Surgeon: Ama Barrera MD;  Location: [facility] ENDO;  Service: Endoscopy;  Laterality: N/A;    COMPUTED TOMOGRAPHY N/A 9/9/2019    Procedure: CT (COMPUTED TOMOGRAPHY);  Surgeon: [facility] Diagnostic Provider;  Location: [facility] OR;  Service: General;  Laterality: N/A;  Microwave ablation to be done in CT.  Need CRNA and cart    COMPUTED TOMOGRAPHY N/A 1/25/2022    Procedure: Liver Microwave Ablation;  Surgeon: Red Puentes MD;  Location: [facility];  Service: General;  Laterality: N/A;    CORONARY ARTERY BYPASS GRAFT (CABG) N/A 11/5/2018    Procedure: CORONARY ARTERY BYPASS GRAFT (CABG);  Surgeon: Bear De Luna MD;  Location: [facility];  Service: Cardiothoracic;  Laterality: N/A;  TWO VESSEL BYPASS WITH AORTOTOMY AND EXCISION OF ATHEROSCLEROTIC PLAQUE    CORONARY BYPASS GRAFT ANGIOGRAPHY  3/2/2020    Procedure: Bypass graft study;  Surgeon: Cora Cormier MD;  Location: [facility] CATH LAB;  Service: Cardiology;;    ELBOW BURSA SURGERY Right 2010    ENDOSCOPIC HARVEST OF VEIN Left 11/5/2018    Procedure: SURGICAL PROCUREMENT, VEIN, ENDOSCOPIC;  Surgeon: Bear De Luna MD;  Location: [facility] OR;  Service: Cardiothoracic;  Laterality: Left;    ESOPHAGOGASTRODUODENOSCOPY N/A 7/17/2019    Procedure: ESOPHAGOGASTRODUODENOSCOPY (EGD);  Surgeon: David Carter III, MD;  Location: [facility];  Service: Endoscopy;  Laterality: N/A;    ESOPHAGOGASTRODUODENOSCOPY N/A 12/29/2022    Procedure: ESOPHAGOGASTRODUODENOSCOPY (EGD);  Surgeon: Issa Gayle MD;  Location: [facility] ENDO;  Service: Endoscopy;  Laterality: N/A;     ESOPHAGOGASTRODUODENOSCOPY N/A 1/17/2024    Procedure: EGD (ESOPHAGOGASTRODUODENOSCOPY);  Surgeon: Issa Gayle MD;  Location: [facility];  Service: Endoscopy;  Laterality: N/A;    ETHMOIDECTOMY Bilateral 3/27/2019    Procedure: ETHMOIDECTOMY;  Surgeon: Alejandro Parkinson MD;  Location: [facility] OR;  Service: ENT;  Laterality: Bilateral;    EYE SURGERY      FOOT SURGERY      FRONTAL SINUS OBLITERATION Bilateral 3/27/2019    Procedure: SINUSOTOMY, FRONTAL SINUS, OBLITERATIVE;  Surgeon: Alejandro Parkinson MD;  Location: [facility] OR;  Service: ENT;  Laterality: Bilateral;    FUNCTIONAL ENDOSCOPIC SINUS SURGERY (FESS) Bilateral 3/27/2019    Procedure: FESS (FUNCTIONAL ENDOSCOPIC SINUS SURGERY);  Surgeon: Alejandro Parkinson MD;  Location: [facility] OR;  Service: ENT;  Laterality: Bilateral;  Left Keila bullosa resection     INTRALUMINAL GASTROINTESTINAL TRACT IMAGING VIA CAPSULE N/A 2/2/2024    Procedure: IMAGING PROCEDURE, GI TRACT, INTRALUMINAL, VIA CAPSULE;  Surgeon: Cooper Estrella RN;  Location: [facility];  Service: Endoscopy;  Laterality: N/A;    KNEE ARTHROSCOPY W/ MENISCAL REPAIR Left 06/2019    dr boykin    KNEE SURGERY Right     LEFT HEART CATHETERIZATION Left 3/2/2020    Procedure: CATHETERIZATION, HEART, LEFT;  Surgeon: Cora Cormier MD;  Location: [facility] CATH LAB;  Service: Cardiology;  Laterality: Left;    LIVER BIOPSY  01/2022    MAXILLARY ANTROSTOMY Bilateral 3/27/2019    Procedure: MAXILLARY ANTROSTOMY;  Surgeon: Alejandro Parkinson MD;  Location: [facility] OR;  Service: ENT;  Laterality: Bilateral;    NASAL SEPTOPLASTY N/A 3/27/2019    Procedure: SEPTOPLASTY, NOSE;  Surgeon: Alejandro Parkinson MD;  Location: [facility] OR;  Service: ENT;  Laterality: N/A;    RECTAL SURGERY  2012    STENT, SUPERIOR MESENTERIC ARTERY Left 1/4/2024    Procedure: STENT, SUPERIOR MESENTERIC ARTERY;  Surgeon: ALEX Alfred III, MD;  Location: [address];  Service: Vascular;  Laterality: Left;    TRANSURETHRAL RESECTION OF PROSTATE (TURP) WITHOUT USE OF LASER N/A 6/19/2018     Procedure: TURP, WITHOUT USING LASER;  Surgeon: Cooper Cordova IV, MD;  Location: [facility];  Service: Urology;  Laterality: N/A;    TRIGGER FINGER RELEASE Left      Family History   Problem Relation Age of Onset    Heart disease Mother     Heart disease Father     Heart disease Brother     Colon cancer Neg Hx        Diagnoses  Problem Noted   Episode of Transient Neurologic Symptoms 2/23/2024       Rona Aleman MD      Emergent/Acute neurological consultation requested by spoke provider due to critical concerns for possible cerebrovascular event that could result in permanent loss of neurologic/bodily function, severe disability or death of this patient.  Immediate/timely evaluation by a highly prepared expert is paramount for optimal outcomes  High risk for neurological deterioration if not properly diagnosed  High risk for neurological deterioration if not treated promplty/as soon as possible  Complex diagnostic evaluation may be required (advanced imaging)  High risk treatment options (thrombolytics and/or thrombectomy)    Patient care was coordinated with spoke provider, including but not limted to    Discussing likely diagnosis/etiology of symptoms  Making recommendations for further diagnostic studies  Making recommendations for intravenous thrombolytics or other advanced therapies  Making recommendations for disposition (admission/transfer for higher level of care)

## 2024-02-23 NOTE — ED NOTES
Pt states he has not taken his home medication, including his BP meds & blood thinners, in over a month due to a recent GI bleed.

## 2024-02-23 NOTE — ED NOTES
Spoke with Mohsen in the Pharmacy about the medications not being verified. Mohsen states he will verify the ranolazine.

## 2024-02-23 NOTE — HPI
Cardiology consulted for CP.  Pt f/u by Chikis Remy.  Has h/o 2v CABG, PAF, esophageal varices, PVD, AS, AI, carotid disease, DELONTE, SMA stent.  Nonsmoker.  Used to drink a lot.  Recent events pertinent for severe anemia, s/p PRBC.  Was on Xarelto for a fib, asa/plavix too and was taken off anticoagulation just recently for anemia issues.  S/p GI workup--- varices found on egd.  Today reports exertional angina over last few days, also had L LE numbness in ER prompting possible CVA workup with head CT/MRI ordered.  Ecg on admit most likely sinus arrhythmia, 1 av block no ischemia noted.  Ecgs in past 6 months showed a fib.  Hg 8.2    - troponin.  Negative nuclear stress Aug 2023.  Echo Aug 2023 normal EF, mod AS, mild-mod AI, mild MR.

## 2024-02-23 NOTE — ASSESSMENT & PLAN NOTE
CVA/TIA rule out, symptoms have resolved. Code stroke called, Tele-Neuro recommended CTA head/neck, contraindicated due to contrast allergy. MRI/MRA brain recommended.     --MRI w/out Brain, MRA Brain  --US carotid bilateral  --Neurochecks q4h  --Permissive HTN, Lopressor IV for BP >220/110  --Tele  --PT/OT/SLP

## 2024-02-23 NOTE — ASSESSMENT & PLAN NOTE
Exertional angina.  No acute ecg ischemia noted.    Serial troponin levels.  Echocardiogram.    Will treat with more optimal med tx.  Increase Coreg.  Add Ranexa.  Did not tolerate nitrates w headaches, also uses Viagra.    Not good candidate for invasive cath right now with inability to take DAPT for intervention with recent severe anemia and blood thinners were stopped.    Sl ntg prn.

## 2024-02-23 NOTE — FIRST PROVIDER EVALUATION
Medical screening examination initiated.  I have conducted a focused provider triage encounter, findings are as follows:    Brief history of present illness:   Pt. C/o SOB    Vitals:    02/23/24 1039   BP: (!) 177/91   BP Location: Right arm   Patient Position: Sitting   Pulse: 84   Resp: 18   Temp: 98.8 °F (37.1 °C)   TempSrc: Oral   SpO2: 100%   Weight: 102.7 kg (226 lb 6.6 oz)       Pertinent physical exam:  nad    Brief workup plan:  labs    Preliminary workup initiated; this workup will be continued and followed by the physician or advanced practice provider that is assigned to the patient when roomed.

## 2024-02-24 VITALS
TEMPERATURE: 98 F | RESPIRATION RATE: 18 BRPM | SYSTOLIC BLOOD PRESSURE: 158 MMHG | BODY MASS INDEX: 35.47 KG/M2 | HEART RATE: 74 BPM | DIASTOLIC BLOOD PRESSURE: 74 MMHG | HEIGHT: 67 IN | WEIGHT: 226 LBS | OXYGEN SATURATION: 96 %

## 2024-02-24 LAB
ALBUMIN SERPL BCP-MCNC: 3.3 G/DL (ref 3.5–5.2)
ALP SERPL-CCNC: 73 U/L (ref 55–135)
ALT SERPL W/O P-5'-P-CCNC: 12 U/L (ref 10–44)
ANION GAP SERPL CALC-SCNC: 9 MMOL/L (ref 8–16)
APTT PPP: 28.7 SEC (ref 21–32)
AST SERPL-CCNC: 19 U/L (ref 10–40)
BASOPHILS # BLD AUTO: 0.03 K/UL (ref 0–0.2)
BASOPHILS NFR BLD: 0.9 % (ref 0–1.9)
BILIRUB SERPL-MCNC: 0.5 MG/DL (ref 0.1–1)
BUN SERPL-MCNC: 10 MG/DL (ref 8–23)
CALCIUM SERPL-MCNC: 8.7 MG/DL (ref 8.7–10.5)
CHLORIDE SERPL-SCNC: 108 MMOL/L (ref 95–110)
CO2 SERPL-SCNC: 24 MMOL/L (ref 23–29)
CREAT SERPL-MCNC: 0.9 MG/DL (ref 0.5–1.4)
DIFFERENTIAL METHOD BLD: ABNORMAL
EOSINOPHIL # BLD AUTO: 0.2 K/UL (ref 0–0.5)
EOSINOPHIL NFR BLD: 4.3 % (ref 0–8)
ERYTHROCYTE [DISTWIDTH] IN BLOOD BY AUTOMATED COUNT: 16.8 % (ref 11.5–14.5)
EST. GFR  (NO RACE VARIABLE): >60 ML/MIN/1.73 M^2
GLUCOSE SERPL-MCNC: 90 MG/DL (ref 70–110)
HCT VFR BLD AUTO: 30.1 % (ref 40–54)
HGB BLD-MCNC: 9.1 G/DL (ref 14–18)
IMM GRANULOCYTES # BLD AUTO: 0.02 K/UL (ref 0–0.04)
IMM GRANULOCYTES NFR BLD AUTO: 0.6 % (ref 0–0.5)
INR PPP: 1 (ref 0.8–1.2)
LYMPHOCYTES # BLD AUTO: 1 K/UL (ref 1–4.8)
LYMPHOCYTES NFR BLD: 29.3 % (ref 18–48)
MAGNESIUM SERPL-MCNC: 2 MG/DL (ref 1.6–2.6)
MCH RBC QN AUTO: 24.1 PG (ref 27–31)
MCHC RBC AUTO-ENTMCNC: 30.2 G/DL (ref 32–36)
MCV RBC AUTO: 80 FL (ref 82–98)
MONOCYTES # BLD AUTO: 0.4 K/UL (ref 0.3–1)
MONOCYTES NFR BLD: 12.4 % (ref 4–15)
NEUTROPHILS # BLD AUTO: 1.8 K/UL (ref 1.8–7.7)
NEUTROPHILS NFR BLD: 52.5 % (ref 38–73)
NRBC BLD-RTO: 0 /100 WBC
OHS QRS DURATION: 80 MS
OHS QTC CALCULATION: 419 MS
PHOSPHATE SERPL-MCNC: 3.7 MG/DL (ref 2.7–4.5)
PLATELET # BLD AUTO: 198 K/UL (ref 150–450)
PMV BLD AUTO: 10.3 FL (ref 9.2–12.9)
POTASSIUM SERPL-SCNC: 4 MMOL/L (ref 3.5–5.1)
PROT SERPL-MCNC: 6.8 G/DL (ref 6–8.4)
PROTHROMBIN TIME: 11.9 SEC (ref 9–12.5)
RBC # BLD AUTO: 3.78 M/UL (ref 4.6–6.2)
SODIUM SERPL-SCNC: 141 MMOL/L (ref 136–145)
WBC # BLD AUTO: 3.48 K/UL (ref 3.9–12.7)

## 2024-02-24 PROCEDURE — 93005 ELECTROCARDIOGRAM TRACING: CPT

## 2024-02-24 PROCEDURE — 25000003 PHARM REV CODE 250: Performed by: NURSE PRACTITIONER

## 2024-02-24 PROCEDURE — 36415 COLL VENOUS BLD VENIPUNCTURE: CPT | Performed by: NURSE PRACTITIONER

## 2024-02-24 PROCEDURE — 94640 AIRWAY INHALATION TREATMENT: CPT | Mod: XB

## 2024-02-24 PROCEDURE — 94761 N-INVAS EAR/PLS OXIMETRY MLT: CPT

## 2024-02-24 PROCEDURE — 85730 THROMBOPLASTIN TIME PARTIAL: CPT | Performed by: NURSE PRACTITIONER

## 2024-02-24 PROCEDURE — 97165 OT EVAL LOW COMPLEX 30 MIN: CPT

## 2024-02-24 PROCEDURE — 99214 OFFICE O/P EST MOD 30 MIN: CPT | Mod: 25,,, | Performed by: INTERNAL MEDICINE

## 2024-02-24 PROCEDURE — 97162 PT EVAL MOD COMPLEX 30 MIN: CPT

## 2024-02-24 PROCEDURE — 93010 ELECTROCARDIOGRAM REPORT: CPT | Mod: ,,, | Performed by: INTERNAL MEDICINE

## 2024-02-24 PROCEDURE — 25000003 PHARM REV CODE 250: Performed by: INTERNAL MEDICINE

## 2024-02-24 PROCEDURE — 25000242 PHARM REV CODE 250 ALT 637 W/ HCPCS: Performed by: INTERNAL MEDICINE

## 2024-02-24 PROCEDURE — 83735 ASSAY OF MAGNESIUM: CPT | Performed by: NURSE PRACTITIONER

## 2024-02-24 PROCEDURE — G0378 HOSPITAL OBSERVATION PER HR: HCPCS

## 2024-02-24 PROCEDURE — G0426 INPT/ED TELECONSULT50: HCPCS | Mod: 95,,, | Performed by: STUDENT IN AN ORGANIZED HEALTH CARE EDUCATION/TRAINING PROGRAM

## 2024-02-24 PROCEDURE — 85025 COMPLETE CBC W/AUTO DIFF WBC: CPT | Performed by: NURSE PRACTITIONER

## 2024-02-24 PROCEDURE — 85610 PROTHROMBIN TIME: CPT | Performed by: NURSE PRACTITIONER

## 2024-02-24 PROCEDURE — 84100 ASSAY OF PHOSPHORUS: CPT | Performed by: NURSE PRACTITIONER

## 2024-02-24 PROCEDURE — 80053 COMPREHEN METABOLIC PANEL: CPT | Performed by: NURSE PRACTITIONER

## 2024-02-24 RX ORDER — ASPIRIN 81 MG/1
81 TABLET ORAL DAILY
Qty: 30 TABLET | Refills: 0 | Status: SHIPPED | OUTPATIENT
Start: 2024-02-24 | End: 2024-02-24 | Stop reason: HOSPADM

## 2024-02-24 RX ORDER — RANOLAZINE 500 MG/1
500 TABLET, EXTENDED RELEASE ORAL 2 TIMES DAILY
Qty: 60 TABLET | Refills: 1 | OUTPATIENT
Start: 2024-02-24 | End: 2024-03-25

## 2024-02-24 RX ORDER — CARVEDILOL 3.12 MG/1
6.25 TABLET ORAL 2 TIMES DAILY
Qty: 120 TABLET | Refills: 0 | Status: SHIPPED | OUTPATIENT
Start: 2024-02-24 | End: 2024-03-01

## 2024-02-24 RX ADMIN — ARFORMOTEROL TARTRATE 15 MCG: 15 SOLUTION RESPIRATORY (INHALATION) at 07:02

## 2024-02-24 RX ADMIN — LEVOTHYROXINE SODIUM 300 MCG: 150 TABLET ORAL at 06:02

## 2024-02-24 RX ADMIN — BUDESONIDE 0.5 MG: 0.5 INHALANT RESPIRATORY (INHALATION) at 07:02

## 2024-02-24 RX ADMIN — RANOLAZINE 500 MG: 500 TABLET, EXTENDED RELEASE ORAL at 08:02

## 2024-02-24 RX ADMIN — CARVEDILOL 6.25 MG: 6.25 TABLET, FILM COATED ORAL at 08:02

## 2024-02-24 NOTE — PLAN OF CARE
[facility] - Med Surg  Discharge Final Note    Primary Care Provider: Bhupinder Callaway MD    Expected Discharge Date: 2/24/2024    Final Discharge Note (most recent)       Final Note - 02/24/24 1120          Final Note    Assessment Type Final Discharge Note     Anticipated Discharge Disposition Home or Self Care        Post-Acute Status    Discharge Delays None known at this time                     Important Message from Medicare         Dischargehome, no home health or dme orders noted.

## 2024-02-24 NOTE — SUBJECTIVE & OBJECTIVE
Review of Systems   Constitutional: Negative.   HENT: Negative.     Eyes: Negative.    Cardiovascular:  Positive for chest pain.   Respiratory: Negative.     Endocrine: Negative.    Hematologic/Lymphatic: Negative.    Skin: Negative.    Musculoskeletal: Negative.    Gastrointestinal: Negative.    Genitourinary: Negative.    Neurological: Negative.    Psychiatric/Behavioral: Negative.     Allergic/Immunologic: Negative.      Objective:     Vital Signs (Most Recent):  Temp: 98.5 °F (36.9 °C) (02/24/24 1121)  Pulse: 78 (02/24/24 1121)  Resp: 18 (02/24/24 1121)  BP: (!) 152/81 (02/24/24 1121)  SpO2: 98 % (02/24/24 1121) Vital Signs (24h Range):  Temp:  [98 °F (36.7 °C)-98.5 °F (36.9 °C)] 98.5 °F (36.9 °C)  Pulse:  [65-88] 78  Resp:  [14-28] 18  SpO2:  [95 %-100 %] 98 %  BP: (129-186)/(60-90) 152/81     Weight: 102.5 kg (226 lb)  Body mass index is 35.4 kg/m².     SpO2: 98 %         Intake/Output Summary (Last 24 hours) at 2/24/2024 1213  Last data filed at 2/24/2024 0520  Gross per 24 hour   Intake 915.67 ml   Output --   Net 915.67 ml       Lines/Drains/Airways       Peripheral Intravenous Line  Duration                  Peripheral IV - Single Lumen 02/23/24 1223 20 G Left Antecubital <1 day                       Physical Exam  Vitals and nursing note reviewed.   Constitutional:       Appearance: He is well-developed.   HENT:      Head: Normocephalic.   Neck:      Thyroid: No thyromegaly.      Vascular: No carotid bruit or JVD.   Cardiovascular:      Rate and Rhythm: Normal rate and regular rhythm.      Pulses:           Radial pulses are 2+ on the right side and 2+ on the left side.      Heart sounds: S1 normal and S2 normal. Heart sounds not distant. No midsystolic click and no opening snap. Murmur heard.      Harsh mid to late systolic murmur is present with a grade of 2/6 at the upper left sternal border radiating to the neck.      No friction rub. No S3 or S4 sounds.   Pulmonary:      Effort: Pulmonary effort  "is normal.      Breath sounds: Normal breath sounds. No wheezing or rales.   Abdominal:      General: Bowel sounds are normal. There is no distension or abdominal bruit.      Palpations: Abdomen is soft. There is no mass.   Musculoskeletal:      Cervical back: Neck supple.   Skin:     General: Skin is warm.   Neurological:      Mental Status: He is alert and oriented to person, place, and time.   Psychiatric:         Behavior: Behavior normal.            Significant Labs: ABG: No results for input(s): "PH", "PCO2", "HCO3", "POCSATURATED", "BE" in the last 48 hours., Blood Culture: No results for input(s): "LABBLOO" in the last 48 hours., BMP:   Recent Labs   Lab 02/23/24  1223 02/24/24  0559    90    141   K 4.0 4.0    108   CO2 25 24   BUN 15 10   CREATININE 1.0 0.9   CALCIUM 9.2 8.7   MG  --  2.0   , CMP   Recent Labs   Lab 02/23/24  1223 02/24/24  0559    141   K 4.0 4.0    108   CO2 25 24    90   BUN 15 10   CREATININE 1.0 0.9   CALCIUM 9.2 8.7   PROT 7.3 6.8   ALBUMIN 3.7 3.3*   BILITOT 0.4 0.5   ALKPHOS 76 73   AST 19 19   ALT 15 12   ANIONGAP 11 9   , CBC   Recent Labs   Lab 02/23/24  1223 02/24/24  0559   WBC 4.40 3.48*   HGB 8.2* 9.1*   HCT 27.2* 30.1*    198   , INR   Recent Labs   Lab 02/23/24  1324 02/24/24  0559   INR 1.0 1.0   , Lipid Panel   Recent Labs   Lab 02/23/24  1324   CHOL 91*   HDL 43   LDLCALC 28.2*   TRIG 99   CHOLHDL 47.3   , and Troponin   Recent Labs   Lab 02/23/24  1223 02/23/24  1634 02/23/24  2028   TROPONINI 0.006 0.022 <0.006       Significant Imaging: Echocardiogram: Transthoracic echo (TTE) complete (Cupid Only):   Results for orders placed or performed during the hospital encounter of 02/23/24   Echo Saline Bubble? No   Result Value Ref Range    Left Atrium Major Axis 5.71 cm    Left Atrium Minor Axis 6.14 cm    RA Major Axis 5.60 cm    LV Diastolic Volume 89.47 mL    LV Systolic Volume 39.13 mL    TR Max Delbert 3.53 m/s    AR Max Delbert " 4.04 m/s    PV mean gradient 1 mmHg    RVOT peak VTI 16.0 cm    RVOT peak chandrika 0.81 m/s    Ao VTI 85.30 cm    Ao peak chandrika 3.76 m/s    LVOT peak VTI 23.70 cm    LVOT peak chandrika 0.99 m/s    LVOT diameter 2.10 cm    PV peak gradient 6 mmHg    AV mean gradient 31 mmHg    AV regurgitation pressure 1/2 time [id number] ms    TAPSE 1.84 cm    RVDD 3.84 cm    LA size 4.92 cm    Ascending aorta 3.60 cm    STJ 2.96 cm    LVIDs 3.14 2.1 - 4.0 cm    Ao root annulus 3.64 cm    Posterior Wall 1.26 (A) 0.6 - 1.1 cm    IVS 1.33 (A) 0.6 - 1.1 cm    LVIDd 4.44 3.5 - 6.0 cm    PV PEAK VELOCITY 1.20 m/s    Left Ventricular Outflow Tract Mean Gradient 2.07 mmHg    Left Ventricular Outflow Tract Mean Velocity 0.66 cm/s    IVC diameter 2.13 cm    FS 29 28 - 44 %    LV mass 216.84 g    Left Ventricle Relative Wall Thickness 0.57 cm    AV valve area 0.96 cm²    AV Velocity Ratio 0.26     AV index (prosthetic) 0.28     LVOT area 3.5 cm2    LVOT stroke volume 82.05 cm3    AV peak gradient 57 mmHg    Triscuspid Valve Regurgitation Peak Gradient 50 mmHg    RADHA by Velocity Ratio 0.91 cm²    BSA 2.2 m2    LV Systolic Volume Index 18.4 mL/m2    LV Diastolic Volume Index 42.00 mL/m2    LV Mass Index 102 g/m2    ZLVIDS -2.19     ZLVIDD -4.25     LA Volume Index 54.6 mL/m2    LA volume 116.30 cm3    LA WIDTH 4.7 cm    RA Width 4.5 cm    TV resting pulmonary artery pressure 58 mmHg    RV TB RVSP 12 mmHg    Est. RA pres 8 mmHg    Narrative      Left Ventricle: The left ventricle is normal in size. Normal wall   thickness. There is mild concentric hypertrophy. There is normal systolic   function with a visually estimated ejection fraction of 60 - 65%. There is   normal diastolic function.    Right Ventricle: Normal right ventricular cavity size. Wall thickness   is normal. Right ventricle wall motion  is normal. Systolic function is   normal.    Left Atrium: Left atrium is severely dilated.    Aortic Valve: There is moderate to severe stenosis.  Aortic valve area   by VTI is 0.96 cm². Aortic valve peak velocity is 3.76 m/s. Mean gradient   is 31 mmHg. The dimensionless index is 0.28. There is mild aortic   regurgitation.    Mitral Valve: There is mild regurgitation.    Tricuspid Valve: There is moderate regurgitation.    Pulmonary Artery: There is moderate pulmonary hypertension. The   estimated pulmonary artery systolic pressure is 58 mmHg.    IVC/SVC: Intermediate venous pressure at 8 mmHg.

## 2024-02-24 NOTE — HOSPITAL COURSE
2/24/24: Pt seen and examined this am.  He felt good he stated. Walked without angina.  Echo normal EF, mod-severe AS.  Negative troponin levels.

## 2024-02-24 NOTE — HOSPITAL COURSE
78 y.o. male with a PMHx of open heart surgery, CAD, Afib, DM, anemia, HTN, and HLD. Presented to the ED for chest tightness( during exertion) and left leg numbness on 2/23/24; His left leg numbness onset about 1 hour Prior to assessment while he was in the waiting room.  Per report of ED physician, neurology does not recommend TPA at this time., CT Head: No acute finding. Recommended for MRI/MRA. Patient is a full code. Placed in observation under the care of Hospital Medicine for management of Chest Pain, CVA Ruleout.   He sees Dr. Gallardo (Cardiology). S/P SMA stent  1/4/24.  However noted to have 3 episodes of GI bleed, per review of chart, has been off anti-coagulation since 1/16/24;    An EGD (01/17/24) showed grade I esophageal varices without bleeding and gastritis. Today he denies nausea, vomiting, abdominal pain, change in appetite or hematochezia.   colonoscopy (12/2023): colon polyps and ischemic ulcers which led to the recent SMA stenting.      As of 02/24/2024, examination of patient denied bedside, appeared alert and oriented x3, denied acute issues overnight.  Denied headache, dizziness, chest pain, shortness O breath, nausea, vomiting, further lower extremity numbness.  Able to ambulate without issues.    CT head negative, MRI brain did not show acute findings; MRA brain did not show acute findings;   US carotids- 50-69% stenosis on the right and less than 50% stenosis on the left progressed from the prior exam;  Cardiology evaluated and recommended Ranexa, increase dose of Coreg for angina/coronary artery disease.  Recommended to reconsider starting aspirin 81 mg q.day to see if patient can tolerate, if patient agrees.    Given recent history of anemia/GI bleeding issues, stated not a candidate for left heart catheterization as of now.  Recommended close follow up with patient's primary cardiologist Dr. Gallardo after discharge to assess coronary artery disease/angina and patient's ability to  tolerate aspirin if patient opts to restart.  Had stress test MPI in August 2023 which was negative.    Echo showed EF of 60-65%, moderate-to-severe aortic stenosis, cardiologist stated patient would eventually need AVR, for which also needs close outpatient follow up with p.r.n. cardiologist.    Given underlying history of paroxysmal atrial fibrillation, current episode with concerns for possible TIA, inability to consider anticoagulants given GI bleed, recommended outpatient follow up with EP/Dr. Huerta after discharge to evaluate for PAF and possible Watchman device.    Has been evaluated by neurologist, initially recommended to consider Pradaxa if patient has already been on anticoagulants/Xarelto, however given history of GI bleed, recommended outpatient follow up with Cardiology/GI to initiate blood thinners accordingly, also to consider LAAO/Watchman for AFib.  Deemed okay for discharge from Cardiology/Neurology standpoint;  PT OT evaluated and no recommendations as of now;    Discussed with patient in detail about Cardiology/Neurology recommendations, patient stated having upcoming appointment with Dr. Gallardo, we will consider initiation of aspirin as outpatient likely after discussing with his primary cardiologist.  Also emphasized on outpatient follow up with EP to address paroxysmal AFib.  Considering clinical and hemodynamic stability, planning to discharge patient today, emphasized on compliance with medications, outpatient follow up with PCP/Cardiology/EP/GI within 1-2 weeks upon discharge, patient expressed understanding, agreed with the plan  Discharge accordingly today

## 2024-02-24 NOTE — PT/OT/SLP EVAL
Physical Therapy Evaluation and Discharge Note    Patient Name:  Erendira Pretty   MRN:  [id number]    Recommendations:     Discharge Recommendations:    Discharge Equipment Recommendations: none   Barriers to discharge: None    Assessment:     Erendira Pretty is a 78 y.o. male admitted with a medical diagnosis of Chest pain. .  At this time, patient is functioning at their prior level of function and does not require further acute PT services.     Recent Surgery: * No surgery found *      Plan:     During this hospitalization, patient does not require further acute PT services.  Please re-consult if situation changes.      Subjective     Chief Complaint: pt reports no complaints. States that he is feeling better today  Patient/Family Comments/goals: to be able to go home today   Pain/Comfort:  Pain Rating 1: 0/10    Patients cultural, spiritual, Jainism conflicts given the current situation: no    Living Environment:  Pt lives alone in single story home with no MITZI.  Prior to admission, patients level of function was I with all mobility, no AD. Still working and still driving.  Equipment used at home: none.  DME owned (not currently used): none.  Upon discharge, patient will have assistance from none.    Objective:     Communicated with RN June prior to session.  Patient found ambulatory in room/kevin with peripheral IV upon PT entry to room.    General Precautions: Standard, fall    Orthopedic Precautions:N/A   Braces: N/A  Respiratory Status: Room air    Exams:  Cognitive Exam:  Patient is oriented to Person, Place, Time, and Situation  RLE ROM: WFL  RLE Strength: WFL  LLE ROM: WFL  LLE Strength: WFL    Functional Mobility:  Bed Mobility:     Rolling Left:  independence  Scooting: independence  Sit to Supine: independence  Transfers:     Sit to Stand:  independence with no AD  Bed to Chair: independence with  no AD  using  Stand Pivot  Gait: 250' with no AD independently     AM-PAC 6 CLICK MOBILITY  Total  "Score:24       Treatment and Education:  Pt ambulating in hallway upon PT arrival with no AD. Patient is at baseline level for all mobility, no LOB    Pt educated on role of PT in acute care and POC. Educated on importance of OOB activities, activity pacing, and HEP (marching/hip flex, hip abd, heel slides/LAQ, quad sets, ankle pumps) in order to maintain/regain strength. Encouraged to sit up in chair for all meals. Educated on proper use of RW for safety and to reduce risk of falling. Educated on "call don't fall" policy and increased risk of falling due to weakness, instructed to utilize call bell for assistance with all transfers. Pt agreeable to all requests.      AM-PAC 6 CLICK MOBILITY  Total Score:24     Patient left up in chair with all lines intact, call button in reach, and RN present.    GOALS:   Multidisciplinary Problems       Physical Therapy Goals       Not on file                    History:     Past Medical History:   Diagnosis Date    Anticoagulant long-term use     Aortic stenosis     Asthma     Benign prostatic hyperplasia with nocturia     Bilateral carotid artery stenosis 07/21/2021     CAROTID BILATERAL 07/14/2021 IMPRESSION: Atherosclerosis with suspected stenosis greater than 50% at the right proximal ICA visually. Velocities are discordant and appear improved from prior. Atherosclerosis on the left with no evidence of flow-limiting stenosis greater than 50%.  FINDINGS: Right: Internal Carotid Artery (ICA): Peak systolic velocity 118 cm/sec. End diastolic velocity 35 cm/sec    BPH (benign prostatic hyperplasia)     CAD (coronary artery disease)     40% lesion 2002;[facility]    Cirrhosis     Claudication 04/09/2014    Colon polyp     COPD (chronic obstructive pulmonary disease)     ED (erectile dysfunction)     Encounter for blood transfusion     Ex-smoker     Hearing loss NEC     Hepatitis C     Cured;reji brown 2015    Hyperlipidemia     Hypertension     Hypothyroid     s/p tx graves "    Open angle with borderline findings and low glaucoma risk in both eyes 09/22/2020    DELONTE on CPAP     Osteoarthritis     Paroxysmal atrial fibrillation     PVD (peripheral vascular disease)     Secondary esophageal varices without bleeding 06/26/2017    Type 2 diabetes mellitus        Past Surgical History:   Procedure Laterality Date    ANGIOGRAM, EXTREMITY, UNILATERAL Left 1/4/2024    Procedure: ANGIOGRAM, EXTREMITY, UNILATERAL- Femoral / SMS Stent, Poss General;  Surgeon: ALEX Alfred III, MD;  Location: [address];  Service: Vascular;  Laterality: Left;  mGy : 2698.78  Gy.cm : 229.88  Contrast : 62ml  Fluro time : 13.8min    CARDIAC CATHETERIZATION      CARDIAC SURGERY      CARPAL TUNNEL RELEASE Left     CATARACT EXTRACTION      CATARACT EXTRACTION W/ INTRAOCULAR LENS  IMPLANT, BILATERAL  2008    CATHETERIZATION OF BOTH LEFT AND RIGHT HEART N/A 11/2/2018    Procedure: CATHETERIZATION, HEART, BOTH LEFT AND RIGHT;  Surgeon: Frank Gallardo MD;  Location: [facility] CATH LAB;  Service: Cardiology;  Laterality: N/A;    COLONOSCOPY  8/2013    COLONOSCOPY N/A 5/30/2016    Procedure: COLONOSCOPY;  Surgeon: Anna Tomas MD;  Location: [facility] ENDO;  Service: Endoscopy;  Laterality: N/A;    COLONOSCOPY N/A 7/17/2019    Procedure: COLONOSCOPY;  Surgeon: David Carter III, MD;  Location: [facility] ENDO;  Service: Endoscopy;  Laterality: N/A;    COLONOSCOPY N/A 12/14/2023    Procedure: COLONOSCOPY;  Surgeon: Ama Barrera MD;  Location: [facility] ENDO;  Service: Endoscopy;  Laterality: N/A;    COMPUTED TOMOGRAPHY N/A 9/9/2019    Procedure: CT (COMPUTED TOMOGRAPHY);  Surgeon: Sri Diagnostic Provider;  Location: [facility] OR;  Service: General;  Laterality: N/A;  Microwave ablation to be done in CT.  Need CRNA and cart    COMPUTED TOMOGRAPHY N/A 1/25/2022    Procedure: Liver Microwave Ablation;  Surgeon: Red Puentes MD;  Location: [facility];  Service: General;  Laterality: N/A;    CORONARY ARTERY BYPASS GRAFT (CABG) N/A  11/5/2018    Procedure: CORONARY ARTERY BYPASS GRAFT (CABG);  Surgeon: Bear De Luna MD;  Location: [facility] OR;  Service: Cardiothoracic;  Laterality: N/A;  TWO VESSEL BYPASS WITH AORTOTOMY AND EXCISION OF ATHEROSCLEROTIC PLAQUE    CORONARY BYPASS GRAFT ANGIOGRAPHY  3/2/2020    Procedure: Bypass graft study;  Surgeon: Cora Cormier MD;  Location: [facility] CATH LAB;  Service: Cardiology;;    ELBOW BURSA SURGERY Right 2010    ENDOSCOPIC HARVEST OF VEIN Left 11/5/2018    Procedure: SURGICAL PROCUREMENT, VEIN, ENDOSCOPIC;  Surgeon: Bear De Luna MD;  Location: [facility] OR;  Service: Cardiothoracic;  Laterality: Left;    ESOPHAGOGASTRODUODENOSCOPY N/A 7/17/2019    Procedure: ESOPHAGOGASTRODUODENOSCOPY (EGD);  Surgeon: David Carter III, MD;  Location: [facility] ENDO;  Service: Endoscopy;  Laterality: N/A;    ESOPHAGOGASTRODUODENOSCOPY N/A 12/29/2022    Procedure: ESOPHAGOGASTRODUODENOSCOPY (EGD);  Surgeon: Issa Gayle MD;  Location: [facility] ENDO;  Service: Endoscopy;  Laterality: N/A;    ESOPHAGOGASTRODUODENOSCOPY N/A 1/17/2024    Procedure: EGD (ESOPHAGOGASTRODUODENOSCOPY);  Surgeon: Issa Gayle MD;  Location: [facility] ENDO;  Service: Endoscopy;  Laterality: N/A;    ETHMOIDECTOMY Bilateral 3/27/2019    Procedure: ETHMOIDECTOMY;  Surgeon: Alejandro Parkinson MD;  Location: [facility] OR;  Service: ENT;  Laterality: Bilateral;    EYE SURGERY      FOOT SURGERY      FRONTAL SINUS OBLITERATION Bilateral 3/27/2019    Procedure: SINUSOTOMY, FRONTAL SINUS, OBLITERATIVE;  Surgeon: Alejandro Parkinson MD;  Location: [facility] OR;  Service: ENT;  Laterality: Bilateral;    FUNCTIONAL ENDOSCOPIC SINUS SURGERY (FESS) Bilateral 3/27/2019    Procedure: FESS (FUNCTIONAL ENDOSCOPIC SINUS SURGERY);  Surgeon: Alejandro Parkinson MD;  Location: [facility] OR;  Service: ENT;  Laterality: Bilateral;  Left Keila bullosa resection     INTRALUMINAL GASTROINTESTINAL TRACT IMAGING VIA CAPSULE N/A 2/2/2024    Procedure: IMAGING PROCEDURE, GI TRACT, INTRALUMINAL, VIA CAPSULE;   Surgeon: Cooper Estrella RN;  Location: [facility] ENDO;  Service: Endoscopy;  Laterality: N/A;    KNEE ARTHROSCOPY W/ MENISCAL REPAIR Left 06/2019    dr boykin    KNEE SURGERY Right     LEFT HEART CATHETERIZATION Left 3/2/2020    Procedure: CATHETERIZATION, HEART, LEFT;  Surgeon: Cora Cormier MD;  Location: [facility] CATH LAB;  Service: Cardiology;  Laterality: Left;    LIVER BIOPSY  01/2022    MAXILLARY ANTROSTOMY Bilateral 3/27/2019    Procedure: MAXILLARY ANTROSTOMY;  Surgeon: Alejandro Parkinson MD;  Location: [facility] OR;  Service: ENT;  Laterality: Bilateral;    NASAL SEPTOPLASTY N/A 3/27/2019    Procedure: SEPTOPLASTY, NOSE;  Surgeon: Alejandro Parkinson MD;  Location: [facility] OR;  Service: ENT;  Laterality: N/A;    RECTAL SURGERY  2012    STENT, SUPERIOR MESENTERIC ARTERY Left 1/4/2024    Procedure: STENT, SUPERIOR MESENTERIC ARTERY;  Surgeon: ALEX Alfred III, MD;  Location: [address];  Service: Vascular;  Laterality: Left;    TRANSURETHRAL RESECTION OF PROSTATE (TURP) WITHOUT USE OF LASER N/A 6/19/2018    Procedure: TURP, WITHOUT USING LASER;  Surgeon: Cooper Cordova IV, MD;  Location: [facility];  Service: Urology;  Laterality: N/A;    TRIGGER FINGER RELEASE Left        Time Tracking:     PT Received On: 02/24/24  PT Start Time: 0900     PT Stop Time: 0915  PT Total Time (min): 15 min     Billable Minutes: Evaluation 15      02/24/2024

## 2024-02-24 NOTE — DISCHARGE SUMMARY
"[facility]  Discharge Summary      Patient Name: Erendira Pretty  MRN: [id number]  LLOYD: [id number]  Patient Class: OP- Observation  Admission Date: 2/23/2024  Hospital Length of Stay: 0 days  Discharge Date and Time: 2/24/24  Attending Physician: Sepideh Carreon,*   Discharging Provider: Sepideh Carreon MD  Primary Care Provider: Bhupinder Callaway MD    Primary Care Team: [facility]IC[facility]    HPI:   78 y.o. male with a PMHx of open heart surgery, CAD, Afib, DM, anemia, HTN, and HLD. Presented to the ED for chest tightness and left leg numbness which onset this AM. He states he was pulling up weeds in his garden when he began feeling chest tightness that went away after a period of rest. It returned when he walked to the ED from his vehicle. His left leg numbness onset about 1 hour Prior to assessment while he was in the waiting room. He had similar sxs yesterday but not as severe. He reports that he feels exactly how he felt before his open heart surgery. Associated sxs include SOB and CP with exertion. Pt denies any fever, diaphoresis, cough, dysuria, hematochezia, N/V/D, or left leg weakness. He sees Dr. Gallardo (Cardiology). He states that since he has been off blood thinners he has not had any blood in his stool, per review of chart, has been off anti-coagulation since 1/16/24. S/P SMA stent  1/4/24. Per report of ED physician, neurology does not recommend TPA at this time. In the ED, vitals: 177/91, 84, 18, 98.8, 100% RA. Significant Labs" hgb: 8.2, BNP: 283, CT Head: No acute finding. Recommended for MRI/MRA. Patient is a full code. Placed in observation under the care of [facility] for management of Chest Pain, CVA Ruleout.    * No surgery found *      Hospital Course:   78 y.o. male with a PMHx of open heart surgery, CAD, Afib, DM, anemia, HTN, and HLD. Presented to the ED for chest tightness( during exertion) and left leg numbness on 2/23/24; His left " leg numbness onset about 1 hour Prior to assessment while he was in the waiting room.  Per report of ED physician, neurology does not recommend TPA at this time., CT Head: No acute finding. Recommended for MRI/MRA. Patient is a full code. Placed in observation under the care of Hospital Medicine for management of Chest Pain, CVA Ruleout.   He sees Dr. Gallardo (Cardiology). S/P SMA stent  1/4/24.  However noted to have 3 episodes of GI bleed, per review of chart, has been off anti-coagulation since 1/16/24;    An EGD (01/17/24) showed grade I esophageal varices without bleeding and gastritis. Today he denies nausea, vomiting, abdominal pain, change in appetite or hematochezia.   colonoscopy (12/2023): colon polyps and ischemic ulcers which led to the recent SMA stenting.      As of 02/24/2024, examination of patient denied bedside, appeared alert and oriented x3, denied acute issues overnight.  Denied headache, dizziness, chest pain, shortness O breath, nausea, vomiting, further lower extremity numbness.  Able to ambulate without issues.    CT head negative, MRI brain did not show acute findings; MRA brain did not show acute findings;   US carotids- 50-69% stenosis on the right and less than 50% stenosis on the left progressed from the prior exam;  Cardiology evaluated and recommended Ranexa, increase dose of Coreg for angina/coronary artery disease.  Recommended to reconsider starting aspirin 81 mg q.day to see if patient can tolerate, if patient agrees.    Given recent history of anemia/GI bleeding issues, stated not a candidate for left heart catheterization as of now.  Recommended close follow up with patient's primary cardiologist Dr. Gallardo after discharge to assess coronary artery disease/angina and patient's ability to tolerate aspirin if patient opts to restart.  Had stress test MPI in August 2023 which was negative.    Echo showed EF of 60-65%, moderate-to-severe aortic stenosis, cardiologist stated  patient would eventually need AVR, for which also needs close outpatient follow up with p.r.n. cardiologist.    Given underlying history of paroxysmal atrial fibrillation, current episode with concerns for possible TIA, inability to consider anticoagulants given GI bleed, recommended outpatient follow up with EP/Dr. Huerta after discharge to evaluate for PAF and possible Watchman device.    Has been evaluated by neurologist, initially recommended to consider Pradaxa if patient has already been on anticoagulants/Xarelto, however given history of GI bleed, recommended outpatient follow up with Cardiology/GI to initiate blood thinners accordingly, also to consider LAAO/Watchman for AFib.  Deemed okay for discharge from Cardiology/Neurology standpoint;  PT OT evaluated and no recommendations as of now;    Discussed with patient in detail about Cardiology/Neurology recommendations, patient stated having upcoming appointment with Dr. Gallardo, we will consider initiation of aspirin as outpatient likely after discussing with his primary cardiologist.  Also emphasized on outpatient follow up with EP to address paroxysmal AFib.  Considering clinical and hemodynamic stability, planning to discharge patient today, emphasized on compliance with medications, outpatient follow up with PCP/Cardiology/EP/GI within 1-2 weeks upon discharge, patient expressed understanding, agreed with the plan  Discharge accordingly today             Goals of Care Treatment Preferences:  Code Status: Full Code    Living Will: Yes              Consults:   Consults (From admission, onward)          Status Ordering Provider     Inpatient Consult Tele-Vascular Neurology  Once        Provider:  (Not yet assigned)    Completed TATIANA FERRARI     Inpatient consult to Cardiology  Once        Provider:  (Not yet assigned)    Completed EVY MARTINEZ     IP consult to case management/social work  Once        Provider:  (Not yet assigned)     Completed EVY MARTINEZ.     Inpatient consult to Hospitalist  Once        Provider:  (Not yet assigned)    Acknowledged GENARO JONES     Consult to Telemedicine - Acute Stroke  Once        Provider:  (Not yet assigned)    GENARO Montoya            No new Assessment & Plan notes have been filed under this hospital service since the last note was generated.  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    Specialties: Internal Medicine, Pediatrics  Contact information:  [address]  [place] [address]  [phone]               Frank Gallardo MD Follow up in 1 week(s).    Specialties: Interventional Cardiology, Cardiology  Contact information:  [address] [place] [address] [address] [place] [address]  [phone]               Horacio Huerta MD Follow up in 1 week(s).    Specialties: Electrophysiology, Cardiovascular Disease, Cardiology  Contact information:  [address]  [facility] [address]  [phone]                            Patient Instructions:   No discharge procedures on file.    Significant Diagnostic Studies:    Results for orders placed or performed during the hospital encounter of 02/23/24   CBC auto differential   Result Value Ref Range    WBC 4.40 3.90 - 12.70 K/uL    RBC 3.44 (L) 4.60 - 6.20 M/uL    Hemoglobin 8.2 (L) 14.0 - 18.0 g/dL    Hematocrit 27.2 (L) 40.0 - 54.0 %    MCV 79 (L) 82 - 98 fL    MCH 23.8 (L) 27.0 - 31.0 pg    MCHC 30.1 (L) 32.0 - 36.0 g/dL    RDW 17.1 (H) 11.5 - 14.5 %    Platelets 195 150 - 450 K/uL    MPV 9.6 9.2 - 12.9 fL    Immature Granulocytes 0.0 0.0 - 0.5 %    Gran # (ANC) 2.3 1.8 - 7.7 K/uL    Immature Grans (Abs) 0.00 0.00 - 0.04 K/uL    Lymph # 1.3 1.0 - 4.8 K/uL    Mono # 0.7 0.3 - 1.0 K/uL    Eos # 0.1 0.0 - 0.5 K/uL    Baso # 0.02 0.00 - 0.20 K/uL    nRBC 0 0 /100 WBC    Gran % 51.5 38.0 - 73.0 %    Lymph % 29.1 18.0 - 48.0 %    Mono % 15.7 (H) 4.0 - 15.0 %    Eosinophil % 3.2 0.0 - 8.0 %    Basophil % 0.5 0.0 - 1.9 %    Differential Method Automated    Comprehensive metabolic panel   Result Value Ref Range    Sodium 140 136 - 145 mmol/L    Potassium 4.0 3.5 - 5.1 mmol/L    Chloride 104 95 - 110 mmol/L    CO2 25 23 - 29 mmol/L    Glucose 108 70 - 110 mg/dL    BUN 15 8 - 23 mg/dL    Creatinine 1.0 0.5 - 1.4 mg/dL    Calcium 9.2 8.7 - 10.5 mg/dL    Total Protein 7.3 6.0 - 8.4 g/dL    Albumin 3.7 3.5 - 5.2 g/dL    Total Bilirubin 0.4 0.1 - 1.0 mg/dL    Alkaline Phosphatase 76 55 - 135 U/L    AST 19 10 - 40 U/L    ALT 15 10 - 44 U/L    eGFR >60 >60 mL/min/1.73 m^2    Anion Gap 11 8 - 16 mmol/L   Brain natriuretic peptide   Result Value Ref Range     (H) 0 - 99 pg/mL   Troponin I   Result Value Ref Range    Troponin I 0.006 0.000 - 0.026 ng/mL   Urinalysis, Reflex to Urine Culture Urine, Clean Catch    Specimen: Urine, Clean Catch   Result Value Ref Range    Specimen UA Urine, Clean Catch     Color, UA Colorless (A) Yellow, Straw, Shayy    Appearance, UA Clear Clear    pH, UA 7.0 5.0 -  8.0    Specific Gravity, UA <1.005 (A) 1.005 - 1.030    Protein, UA Negative Negative    Glucose, UA 3+ (A) Negative    Ketones, UA Negative Negative    Bilirubin (UA) Negative Negative    Occult Blood UA Negative Negative    Nitrite, UA Negative Negative    Urobilinogen, UA Negative <2.0 EU/dL    Leukocytes, UA Negative Negative   Protime-INR   Result Value Ref Range    Prothrombin Time 11.7 9.0 - 12.5 sec    INR 1.0 0.8 - 1.2   TSH   Result Value Ref Range    TSH 1.434 0.400 - 4.000 uIU/mL   LDL - Lipid Panel   Result Value Ref Range    Cholesterol 91 (L) 120 - 199 mg/dL    Triglycerides 99 30 - 150 mg/dL    HDL 43 40 - 75 mg/dL    LDL Cholesterol 28.2 (L) 63.0 - 159.0 mg/dL    HDL/Cholesterol Ratio 47.3 20.0 - 50.0 %    Total Cholesterol/HDL Ratio 2.1 2.0 - 5.0    Non-HDL Cholesterol 48 mg/dL   Urinalysis Microscopic   Result Value Ref Range    Bacteria None None-Occ /hpf    Yeast, UA None None    Microscopic Comment SEE COMMENT    Iron and TIBC   Result Value Ref Range    Iron 11 (L) 45 - 160 ug/dL    Transferrin 316 200 - 375 mg/dL    TIBC 468 (H) 250 - 450 ug/dL    Saturated Iron 2 (L) 20 - 50 %   Ferritin   Result Value Ref Range    Ferritin 13 (L) 20.0 - 300.0 ng/mL   Troponin I   Result Value Ref Range    Troponin I 0.022 0.000 - 0.026 ng/mL   Troponin I   Result Value Ref Range    Troponin I <0.006 0.000 - 0.026 ng/mL   CBC with Automated Differential   Result Value Ref Range    WBC 3.48 (L) 3.90 - 12.70 K/uL    RBC 3.78 (L) 4.60 - 6.20 M/uL    Hemoglobin 9.1 (L) 14.0 - 18.0 g/dL    Hematocrit 30.1 (L) 40.0 - 54.0 %    MCV 80 (L) 82 - 98 fL    MCH 24.1 (L) 27.0 - 31.0 pg    MCHC 30.2 (L) 32.0 - 36.0 g/dL    RDW 16.8 (H) 11.5 - 14.5 %    Platelets 198 150 - 450 K/uL    MPV 10.3 9.2 - 12.9 fL    Immature Granulocytes 0.6 (H) 0.0 - 0.5 %    Gran # (ANC) 1.8 1.8 - 7.7 K/uL    Immature Grans (Abs) 0.02 0.00 - 0.04 K/uL    Lymph # 1.0 1.0 - 4.8 K/uL    Mono # 0.4 0.3 - 1.0 K/uL    Eos # 0.2 0.0 - 0.5 K/uL     Baso # 0.03 0.00 - 0.20 K/uL    nRBC 0 0 /100 WBC    Gran % 52.5 38.0 - 73.0 %    Lymph % 29.3 18.0 - 48.0 %    Mono % 12.4 4.0 - 15.0 %    Eosinophil % 4.3 0.0 - 8.0 %    Basophil % 0.9 0.0 - 1.9 %    Differential Method Automated    Comprehensive Metabolic Panel (CMP)   Result Value Ref Range    Sodium 141 136 - 145 mmol/L    Potassium 4.0 3.5 - 5.1 mmol/L    Chloride 108 95 - 110 mmol/L    CO2 24 23 - 29 mmol/L    Glucose 90 70 - 110 mg/dL    BUN 10 8 - 23 mg/dL    Creatinine 0.9 0.5 - 1.4 mg/dL    Calcium 8.7 8.7 - 10.5 mg/dL    Total Protein 6.8 6.0 - 8.4 g/dL    Albumin 3.3 (L) 3.5 - 5.2 g/dL    Total Bilirubin 0.5 0.1 - 1.0 mg/dL    Alkaline Phosphatase 73 55 - 135 U/L    AST 19 10 - 40 U/L    ALT 12 10 - 44 U/L    eGFR >60 >60 mL/min/1.73 m^2    Anion Gap 9 8 - 16 mmol/L   Magnesium   Result Value Ref Range    Magnesium 2.0 1.6 - 2.6 mg/dL   Phosphorus   Result Value Ref Range    Phosphorus 3.7 2.7 - 4.5 mg/dL   APTT   Result Value Ref Range    aPTT 28.7 21.0 - 32.0 sec   Protime-INR   Result Value Ref Range    Prothrombin Time 11.9 9.0 - 12.5 sec    INR 1.0 0.8 - 1.2   EKG 12-lead (Shortness of Breath) Age > 50   Result Value Ref Range    QRS Duration 80 ms    [facility]HS QTC Calculation 412 ms   EKG 12-lead   Result Value Ref Range    QRS Duration 80 ms    OHS QTC Calculation 419 ms   Echo Saline Bubble? No   Result Value Ref Range    Left Atrium Major Axis 5.71 cm    Left Atrium Minor Axis 6.14 cm    RA Major Axis 5.60 cm    LV Diastolic Volume 89.47 mL    LV Systolic Volume 39.13 mL    TR Max Delbert 3.53 m/s    AR Max Delbert 4.04 m/s    PV mean gradient 1 mmHg    RVOT peak VTI 16.0 cm    RVOT peak delbert 0.81 m/s    Ao VTI 85.30 cm    Ao peak delbert 3.76 m/s    LVOT peak VTI 23.70 cm    LVOT peak delbert 0.99 m/s    LVOT diameter 2.10 cm    PV peak gradient 6 mmHg    AV mean gradient 31 mmHg    AV regurgitation pressure 1/2 time [id number] ms    TAPSE 1.84 cm    RVDD 3.84 cm    LA size 4.92 cm    Ascending aorta 3.60 cm     STJ 2.96 cm    LVIDs 3.14 2.1 - 4.0 cm    Ao root annulus 3.64 cm    Posterior Wall 1.26 (A) 0.6 - 1.1 cm    IVS 1.33 (A) 0.6 - 1.1 cm    LVIDd 4.44 3.5 - 6.0 cm    PV PEAK VELOCITY 1.20 m/s    Left Ventricular Outflow Tract Mean Gradient 2.07 mmHg    Left Ventricular Outflow Tract Mean Velocity 0.66 cm/s    IVC diameter 2.13 cm    FS 29 28 - 44 %    LV mass 216.84 g    Left Ventricle Relative Wall Thickness 0.57 cm    AV valve area 0.96 cm²    AV Velocity Ratio 0.26     AV index (prosthetic) 0.28     LVOT area 3.5 cm2    LVOT stroke volume 82.05 cm3    AV peak gradient 57 mmHg    Triscuspid Valve Regurgitation Peak Gradient 50 mmHg    RADHA by Velocity Ratio 0.91 cm²    BSA 2.2 m2    LV Systolic Volume Index 18.4 mL/m2    LV Diastolic Volume Index 42.00 mL/m2    LV Mass Index 102 g/m2    ZLVIDS -2.19     ZLVIDD -4.25     LA Volume Index 54.6 mL/m2    LA volume 116.30 cm3    LA WIDTH 4.7 cm    RA Width 4.5 cm    TV resting pulmonary artery pressure 58 mmHg    RV TB RVSP 12 mmHg    Est. RA pres 8 mmHg   Type & Screen   Result Value Ref Range    Group & Rh O POS     Indirect [place] NEG     Specimen Outdate 02/26/2024 23:59    POCT glucose   Result Value Ref Range    POCT Glucose 99 70 - 110 mg/dL   Prepare RBC 1 Unit   Result Value Ref Range    UNIT NUMBER [id number]     Product Code F1979S16     DISPENSE STATUS TRANSFUSED     CODING SYSTEM [id number]     Unit Blood Type Code 5100     Unit Blood Type O POS     Unit Expiration [id number]     CROSSMATCH INTERPRETATION Compatible      *Note: Due to a large number of results and/or encounters for the requested time period, some results have not been displayed. A complete set of results can be found in Results Review.        Imaging Results              MRA Brain (Final result)  Result time 02/23/24 20:46:44      Final result by Eliz Mcbride MD (02/23/24 20:46:44)                   Impression:      No large vessel occlusion or critical stenosis  seen      Electronically signed by: Eliz Mcbride  Date:    02/23/2024  Time:    20:46               Narrative:    EXAMINATION:  MRA BRAIN WITHOUT CONTRAST    CLINICAL HISTORY:  Transient ischemic attack (TIA); .    TECHNIQUE:  Non-contrast 3-D time-of-flight intracranial MR angiography was performed through the [place] [place] with MIP reformatting.    COMPARISON:  None.    FINDINGS:  Anterior intracranial circulation: No high-grade focal stenosis, occlusion, aneurysm, or vascular malformation.    Posterior intracranial circulation: No high-grade focal stenosis, occlusion, aneurysm, or vascular malformation.                                       MRI Brain Without Contrast (Final result)  Result time 02/23/24 20:56:39      Final result by Eliz Mcbride MD (02/23/24 20:56:39)                   Impression:      No acute ischemia or other overt acute finding intracranial; sinusitis      Electronically signed by: Eliz Mcbride  Date:    02/23/2024  Time:    20:56               Narrative:    EXAMINATION:  MRI BRAIN WITHOUT CONTRAST    CLINICAL HISTORY:  Transient ischemic attack (TIA);    TECHNIQUE:  Multiplanar multisequence MR imaging of the brain was performed without contrast.    COMPARISON:  None.    CT correlation    FINDINGS:  No acute ischemia seen.    No mass effect or midline shift.    Mild chronic changes.    Sinusitis present                                       US Carotid Bilateral (Final result)  Result time 02/23/24 19:32:38      Final result by Darrell Iglesias MD (02/23/24 19:32:38)                   Impression:      50-69% stenosis on the right and less than 50% stenosis on the left progressed from the prior exam.      Electronically signed by: Darrell Iglesias  Date:    02/23/2024  Time:    19:32               Narrative:    EXAMINATION:  US CAROTID BILATERAL    CLINICAL HISTORY:  TIA/CVA;    TECHNIQUE:  Grayscale and color Doppler ultrasound examination of the carotid and vertebral  artery systems bilaterally.  Stenosis estimates are per the NASCET measurement criteria.    COMPARISON:  Multiple priors    FINDINGS:  Right:    Internal Carotid Artery (ICA) peak systolic velocity 154 cm/sec    ICA/CCA peak systolic velocity ratio: 2.3    Plaque formation: Heterogeneous    Vertebral artery: Antegrade flow and normal waveform.    Left:    Internal Carotid Artery (ICA)  peak systolic velocity 113 cm/sec    ICA/CCA peak systolic velocity ratio: 1.2    Plaque formation: Homogeneous    Vertebral artery: Antegrade flow and normal waveform.                                       CT Head Without Contrast (Final result)  Result time 02/23/24 13:22:57      Final result by Red Puentes MD (02/23/24 13:22:57)                   Impression:      Chronic microvascular ischemic changes.  No intracranial hemorrhage.  [place] stroke programme early CT score (ASPECTS): 10    Debris and suspected fluid level within the left sphenoid sinus and right frontal sinus which could reflect sinusitis.    COMMUNICATION  This critical result was discovered/received at 13:18.  The critical information above was relayed directly by me by secure chat to Dr. Brissa mabry on 02/23/2024 at 13:22 at .      Electronically signed by: Red Puentes MD  Date:    02/23/2024  Time:    13:22               Narrative:    EXAMINATION:  CT HEAD WITHOUT CONTRAST    CLINICAL HISTORY:  Neuro deficit, acute, stroke suspected;    TECHNIQUE:  Low dose axial CT images obtained throughout the head without intravenous contrast. Sagittal and coronal reconstructions were performed.  All CT scans at this facility use dose modulation, iterative reconstruction and/or weight based dosing when appropriate to reduce radiation dose to as low as reasonably achievable.    COMPARISON:  None.    FINDINGS:  Intracranial compartment:    The brain parenchyma demonstrates areas of decreased attenuation with mild to moderate periventricular white matter consistent  with chronic microvascular ischemic changes..  No parenchymal mass, hemorrhage, edema or major vascular distribution infarct.  Vascular calcifications are noted.    Mild prominence of the sulci and ventricles are consistent with age-related involutional changes.    No extra-axial blood or fluid collections.    Skull/extracranial contents (limited evaluation): No fracture.  Debris and suspected fluid level within the left sphenoid sinus and right frontal sinus which could reflect sinusitis.  Patchy ethmoid opacification noted.  Mastoid air cells and paranasal sinuses are essentially clear.                                       X-Ray Chest AP Portable (Final result)  Result time 02/23/24 12:54:13      Final result by Red Puentes MD (02/23/24 12:54:13)                   Impression:      No acute process seen.      Electronically signed by: Red Puentes MD  Date:    02/23/2024  Time:    12:54               Narrative:    EXAMINATION:  XR CHEST AP PORTABLE    CLINICAL HISTORY:  Dyspnea, unspecified    FINDINGS:  Single view of the chest.  Comparison 5/8/23    Cardiac silhouette is normal.  The lungs demonstrate no evidence of active disease.  No evidence of pleural effusion or pneumothorax.  Bones appear intact.  Moderate degenerative changes and moderate atherosclerotic disease.                                       Pending Diagnostic Studies:       Procedure Component Value Units Date/Time    Urinalysis, Reflex to Urine Culture Urine, Clean Catch [[id number]] Collected: 02/23/24 1554    Order Status: Sent Lab Status: In process Updated: 02/23/24 1554    Specimen: Urine            Medications:       Medication List        START taking these medications      ranolazine 500 MG Tb12  Commonly known as: RANEXA  Take 1 tablet (500 mg total) by mouth 2 (two) times daily.            CHANGE how you take these medications      amLODIPine 10 MG tablet  Commonly known as: NORVASC  TAKE 1 TABLET BY MOUTH ONCE A DAY (DOSE  INCREASE)  What changed: See the new instructions.     carvediloL 3.125 MG tablet  Commonly known as: COREG  Take 2 tablets (6.25 mg total) by mouth 2 (two) times daily.  What changed:   how much to take  when to take this     empagliflozin 25 mg tablet  Commonly known as: JARDIANCE  Take 1 tablet (25 mg total) by mouth once daily.  What changed: when to take this     lisinopriL 10 MG tablet  TAKE 1 BY MOUTH EVERY DAY  What changed: when to take this            CONTINUE taking these medications      ACCU-[place] [facility] [place] drug: blood-glucose meter  AS DIRECTED     ACCU-CHEK FASTCLIX LANCET DRUM [facility] [place] drug: lancets  TEST TWO TIMES A DAY     albuterol 90 mcg/actuation inhaler  Commonly known as: VENTOLIN HFA  Inhale 2 puffs into the lungs every 6 (six) hours as needed for Wheezing or Shortness of Breath. Rescue     blood sugar diagnostic Strp  Commonly known as: ACCU-CHEK GRACE PLUS TEST STRP  TEST THREE TIMES A DAY     budesonide-formoterol 160-4.5 mcg 160-4.5 mcg/actuation Hfaa  Commonly known as: SYMBICORT  INHALE 2 PUFFS INTO THE LUNGS TWO TIMES A DAY.     finasteride 5 mg tablet  Commonly known as: PROSCAR  TAKE 1 TABLET BY MOUTH once daily     furosemide 40 MG tablet  Commonly known as: LASIX  Take 1 tablet (40 mg total) by mouth 2 (two) times daily.     insulin glargine 100 units/mL SubQ pen  Commonly known as: LANTUS SOLOSTAR U-100 INSULIN  Inject 44 Units into the skin every evening.     levocetirizine 5 MG tablet  Commonly known as: XYZAL  Take 1 tablet (5 mg total) by mouth every evening.     levothyroxine 300 MCG Tab  Commonly known as: SYNTHROID  TAKE 1 TABLET BY MOUTH EVERY MORNING     metFORMIN 500 MG ER 24hr tablet  Commonly known as: GLUCOPHAGE-XR  Take 1 tablet (500 mg total) by mouth 2 (two) times daily with meals.     montelukast 10 mg tablet  Commonly known as: SINGULAIR  Take 1 tablet (10 mg total) by mouth once daily.     OSTEO BI-FLEX ORAL     pen needle, diabetic 31  "gauge x 3/16" Ndle  Commonly known as: BD ULTRA-FINE MINI PEN NEEDLE  USE AS DIRECTED     propafenone 150 MG Tab  Commonly known as: RHTHYMOL  Take 1 tablet (150 mg total) by mouth every 8 (eight) hours.     RABEprazole 20 mg tablet  Commonly known as: ACIPHEX  Take 1 tablet (20 mg total) by mouth 2 (two) times daily.     REPATHA SURECLICK 140 mg/mL Pnij  Generic drug: evolocumab     sildenafiL 100 MG tablet  Commonly known as: VIAGRA  Take 1/2 to 1 tablet by mouth one hour prior to intercourse. Max 100mg per day               Where to Get Your Medications        These medications were sent to [facility] [address] [address]  [address] [address] [address]      Phone: [phone]   carvediloL 3.125 MG tablet  ranolazine 500 MG Tb12          Indwelling Lines/Drains at time of discharge:   Lines/Drains/Airways       None                   Time spent on the discharge of patient: 87 minutes         Sepideh Carreon MD  Department of Hospital Medicine  [facility]  "

## 2024-02-24 NOTE — PT/OT/SLP EVAL
"Occupational Therapy   Evaluation and Discharge Note    Name: Erendira Pretty  MRN: [id number]  Admitting Diagnosis: Chest pain  Recent Surgery: * No surgery found *      Recommendations:     Discharge Recommendations: No Therapy Indicated  Discharge Equipment Recommendations: none  Barriers to discharge:  None    Assessment:     Erendira Pretty is a 78 y.o. male with a medical diagnosis of Chest pain. At this time, patient is functioning at their prior level of function and does not require further acute OT services.     Plan:     During this hospitalization, patient does not require further acute OT services.  Please re-consult if situation changes.    Plan of Care Reviewed with: patient    Subjective     Chief Complaint: chest pain (resolved)  Patient/Family Comments/goals: DC home at prior level of function.    Occupational Profile:  Living Environment: lives alone  Previous level of function: independent in ADL  Roles and Routines: retired "[employer]"  Equipment Used at home: none  Assistance upon Discharge: None    Pain/Comfort:  Pain Rating 1: 0/10  Pain Rating Post-Intervention 1: 0/10    Patients cultural, spiritual, Church conflicts given the current situation: no    Objective:     Communicated with: nurse prior to session.  Patient found ambulatory in room/kevin with peripheral IV, telemetry upon OT entry to room.    General Precautions: Standard, fall  Orthopedic Precautions: N/A  Braces: N/A  Respiratory Status: Room air     Occupational Performance:    Bed Mobility:    independent    Functional Mobility/Transfers:  Patient completed Sit <> Stand Transfer with independence  with  no assistive device   Patient completed Chair <> Mat Step Transfer technique with independence with no assistive device  Patient completed Toilet Transfer Step Transfer technique with independence with  no AD    Activities of Daily Living:  Grooming: independence    Upper Body Dressing: independence    Lower Body Dressing: " independence    Toileting: independence      Cognitive/Visual Perceptual:  Cognitive/Psychosocial Skills:     -       Oriented to: Person, Place, Time, and Situation   -       Follows Commands/attention:Follows multistep  commands  -       Safety awareness/insight to disability: intact   -       Mood/Affect/Coping skills/emotional control: Appropriate to situation and Cooperative  Visual/Perceptual:      -Intact      Physical Exam:  Balance:    -       good sitting and standing balances  Upper Extremity Range of Motion:     -       Right Upper Extremity: WNL  -       Left Upper Extremity: WNL  Upper Extremity Strength:    -       Right Upper Extremity: WNL  -       Left Upper Extremity: WNL  Neurological:    -       no deficits noted    AMPAC 6 Click ADL:  AMPAC Total Score: 24    Patient left ambulatory in room/kevin with all lines intact and call button in reach    GOALS:   Multidisciplinary Problems       Occupational Therapy Goals          Problem: Occupational Therapy    Goal Priority Disciplines Outcome Interventions   Occupational Therapy Goal     OT, PT/OT     Description: Initial evaluation provided. No acute nor post-acute OT indicated.                       History:     Past Medical History:   Diagnosis Date    Anticoagulant long-term use     Aortic stenosis     Asthma     Benign prostatic hyperplasia with nocturia     Bilateral carotid artery stenosis 07/21/2021    US CAROTID BILATERAL 07/14/2021 IMPRESSION: Atherosclerosis with suspected stenosis greater than 50% at the right proximal ICA visually. Velocities are discordant and appear improved from prior. Atherosclerosis on the left with no evidence of flow-limiting stenosis greater than 50%.  FINDINGS: Right: Internal Carotid Artery (ICA): Peak systolic velocity 118 cm/sec. 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 09/22/2020    DELONTE on CPAP     Osteoarthritis     Paroxysmal atrial fibrillation     PVD (peripheral vascular disease)     Secondary esophageal varices without bleeding 06/26/2017    Type 2 diabetes mellitus          Past Surgical History:   Procedure Laterality Date    ANGIOGRAM, EXTREMITY, UNILATERAL Left 1/4/2024    Procedure: ANGIOGRAM, EXTREMITY, UNILATERAL- Femoral / SMS Stent, Poss General;  Surgeon: ALEX Alfred III, MD;  Location: [facility] OR [address] [place] [address];  Service: Vascular;  Laterality: Left;  mGy : 2698.78  Gy.cm : 229.88  Contrast : 62ml  Fluro time : 13.8min    CARDIAC CATHETERIZATION      CARDIAC SURGERY      CARPAL TUNNEL RELEASE Left     CATARACT EXTRACTION      CATARACT EXTRACTION W/ INTRAOCULAR LENS  IMPLANT, BILATERAL  2008    CATHETERIZATION OF BOTH LEFT AND RIGHT HEART N/A 11/2/2018    Procedure: CATHETERIZATION, HEART, BOTH LEFT AND RIGHT;  Surgeon: Frank Gallardo MD;  Location: [facility] CATH LAB;  Service: Cardiology;  Laterality: N/A;    COLONOSCOPY  8/2013    COLONOSCOPY N/A 5/30/2016    Procedure: COLONOSCOPY;  Surgeon: Anna Tomas MD;  Location: [facility] ENDO;  Service: Endoscopy;  Laterality: N/A;    COLONOSCOPY N/A 7/17/2019    Procedure: COLONOSCOPY;  Surgeon: David Carter III, MD;  Location: [facility] ENDO;  Service: Endoscopy;  Laterality: N/A;    COLONOSCOPY N/A 12/14/2023    Procedure: COLONOSCOPY;  Surgeon: Ama Barrera MD;  Location: [facility] ENDO;  Service: Endoscopy;  Laterality: N/A;    COMPUTED TOMOGRAPHY N/A 9/9/2019    Procedure: CT (COMPUTED TOMOGRAPHY);  Surgeon: [facility];  Location: [facility] OR;  Service: General;   Laterality: N/A;  Microwave ablation to be done in CT.  Need CRNA and cart    COMPUTED TOMOGRAPHY N/A 1/25/2022    Procedure: Liver Microwave Ablation;  Surgeon: Red Puentes MD;  Location: [facility];  Service: General;  Laterality: N/A;    CORONARY ARTERY BYPASS GRAFT (CABG) N/A 11/5/2018    Procedure: CORONARY ARTERY BYPASS GRAFT (CABG);  Surgeon: Bear De Luna MD;  Location: [facility];  Service: Cardiothoracic;  Laterality: N/A;  TWO VESSEL BYPASS WITH AORTOTOMY AND EXCISION OF ATHEROSCLEROTIC PLAQUE    CORONARY BYPASS GRAFT ANGIOGRAPHY  3/2/2020    Procedure: Bypass graft study;  Surgeon: Cora Cormier MD;  Location: [facility] CATH LAB;  Service: Cardiology;;    ELBOW BURSA SURGERY Right 2010    ENDOSCOPIC HARVEST OF VEIN Left 11/5/2018    Procedure: SURGICAL PROCUREMENT, VEIN, ENDOSCOPIC;  Surgeon: Bear De Luna MD;  Location: [facility];  Service: Cardiothoracic;  Laterality: Left;    ESOPHAGOGASTRODUODENOSCOPY N/A 7/17/2019    Procedure: ESOPHAGOGASTRODUODENOSCOPY (EGD);  Surgeon: David Carter III, MD;  Location: [facility];  Service: Endoscopy;  Laterality: N/A;    ESOPHAGOGASTRODUODENOSCOPY N/A 12/29/2022    Procedure: ESOPHAGOGASTRODUODENOSCOPY (EGD);  Surgeon: Issa Gayle MD;  Location: [facility];  Service: Endoscopy;  Laterality: N/A;    ESOPHAGOGASTRODUODENOSCOPY N/A 1/17/2024    Procedure: EGD (ESOPHAGOGASTRODUODENOSCOPY);  Surgeon: Isas Gayle MD;  Location: [facility] ENDO;  Service: Endoscopy;  Laterality: N/A;    ETHMOIDECTOMY Bilateral 3/27/2019    Procedure: ETHMOIDECTOMY;  Surgeon: Alejandro Parkinson MD;  Location: [facility];  Service: ENT;  Laterality: Bilateral;    EYE SURGERY      FOOT SURGERY      FRONTAL SINUS OBLITERATION Bilateral 3/27/2019    Procedure: SINUSOTOMY, FRONTAL SINUS, OBLITERATIVE;  Surgeon: Alejandro Parkinson MD;  Location: [facility];  Service: ENT;  Laterality: Bilateral;    FUNCTIONAL ENDOSCOPIC SINUS SURGERY (FESS) Bilateral 3/27/2019    Procedure: FESS (FUNCTIONAL  ENDOSCOPIC SINUS SURGERY);  Surgeon: Alejandro Parkinson MD;  Location: [facility] OR;  Service: ENT;  Laterality: Bilateral;  Left Keila bullosa resection     INTRALUMINAL GASTROINTESTINAL TRACT IMAGING VIA CAPSULE N/A 2/2/2024    Procedure: IMAGING PROCEDURE, GI TRACT, INTRALUMINAL, VIA CAPSULE;  Surgeon: Cooper Estrella RN;  Location: [facility] ENDO;  Service: Endoscopy;  Laterality: N/A;    KNEE ARTHROSCOPY W/ MENISCAL REPAIR Left 06/2019    dr boykin    KNEE SURGERY Right     LEFT HEART CATHETERIZATION Left 3/2/2020    Procedure: CATHETERIZATION, HEART, LEFT;  Surgeon: Cora Cormier MD;  Location: [facility] CATH LAB;  Service: Cardiology;  Laterality: Left;    LIVER BIOPSY  01/2022    MAXILLARY ANTROSTOMY Bilateral 3/27/2019    Procedure: MAXILLARY ANTROSTOMY;  Surgeon: Alejandro Parkinson MD;  Location: [facility] OR;  Service: ENT;  Laterality: Bilateral;    NASAL SEPTOPLASTY N/A 3/27/2019    Procedure: SEPTOPLASTY, NOSE;  Surgeon: Alejandro Parkinson MD;  Location: [facility] OR;  Service: ENT;  Laterality: N/A;    RECTAL SURGERY  2012    STENT, SUPERIOR MESENTERIC ARTERY Left 1/4/2024    Procedure: STENT, SUPERIOR MESENTERIC ARTERY;  Surgeon: ALEX Alfred III, MD;  Location: [address];  Service: Vascular;  Laterality: Left;    TRANSURETHRAL RESECTION OF PROSTATE (TURP) WITHOUT USE OF LASER N/A 6/19/2018    Procedure: TURP, WITHOUT USING LASER;  Surgeon: Cooper Cordova IV, MD;  Location: [facility] OR;  Service: Urology;  Laterality: N/A;    TRIGGER FINGER RELEASE Left        Time Tracking:     OT Date of Treatment: 02/24/24  OT Start Time: 0900  OT Stop Time: 0910  OT Total Time (min): 10 min    Billable Minutes:Evaluation 10    2/24/2024

## 2024-02-24 NOTE — PLAN OF CARE
Pt to be dishcharged, pt aaox4, independent and ambulating in the hallway. No physical deficits noted on assessment.   Problem: Adult Inpatient Plan of Care  Goal: Plan of Care Review  Outcome: Met  Goal: Patient-Specific Goal (Individualized)  Outcome: Met  Goal: Absence of Hospital-Acquired Illness or Injury  Outcome: Met  Goal: Optimal Comfort and Wellbeing  Outcome: Met  Goal: Readiness for Transition of Care  Outcome: Met     Problem: Infection  Goal: Absence of Infection Signs and Symptoms  Outcome: Met     Problem: Adjustment to Illness (Stroke, Ischemic/Transient Ischemic Attack)  Goal: Optimal Coping  Outcome: Met     Problem: Bowel Elimination Impaired (Stroke, Ischemic/Transient Ischemic Attack)  Goal: Effective Bowel Elimination  Outcome: Met     Problem: Cerebral Tissue Perfusion (Stroke, Ischemic/Transient Ischemic Attack)  Goal: Optimal Cerebral Tissue Perfusion  Outcome: Met     Problem: Cognitive Impairment (Stroke, Ischemic/Transient Ischemic Attack)  Goal: Optimal Cognitive Function  Outcome: Met     Problem: Communication Impairment (Stroke, Ischemic/Transient Ischemic Attack)  Goal: Improved Communication Skills  Outcome: Met     Problem: Functional Ability Impaired (Stroke, Ischemic/Transient Ischemic Attack)  Goal: Optimal Functional Ability  Outcome: Met     Problem: Respiratory Compromise (Stroke, Ischemic/Transient Ischemic Attack)  Goal: Effective Oxygenation and Ventilation  Outcome: Met     Problem: Sensorimotor Impairment (Stroke, Ischemic/Transient Ischemic Attack)  Goal: Improved Sensorimotor Function  Outcome: Met     Problem: Swallowing Impairment (Stroke, Ischemic/Transient Ischemic Attack)  Goal: Optimal Eating and Swallowing without Aspiration  Outcome: Met     Problem: Urinary Elimination Impaired (Stroke, Ischemic/Transient Ischemic Attack)  Goal: Effective Urinary Elimination  Outcome: Met     Problem: Diabetes Comorbidity  Goal: Blood Glucose Level Within Targeted  Range  Outcome: Met

## 2024-02-24 NOTE — CONSULTS
[facility] Surg  Neurology  Consult Note    Patient Name: Erendira Pretty  MRN: [id number]  Admission Date: 2/23/2024  Hospital Length of Stay: 0 days  Code Status: Full Code   Attending Provider: Sepideh Carreon,*   Consulting Provider: Jorge Luis Giron MD  Primary Care Physician: Bhupinder Callaway MD  Principal Problem:Chest pain    Inpatient Consult Tele-Vascular Neurology  Consult performed by: Jorge Luis Giron MD  Consult ordered by: Sepideh Carreon MD  Reason for consult: TIA  Assessment/Recommendations: Risk factors: HTN, HLD, DM II, paroxysmal AFib not on anticoagulation, age, CAD/MI    -Order HbA1C to complete stroke labs   -consult cardiology for evaluation for left atrial appendage closure device placement  -Goal Parameters for TIA/stroke: LDL<70 mg/dl, HbA1C: <7.0 %,  SBP<130/80 (discussed risk factor optimization in order to reduce the risk of future events with help of PCP)  -PT/OT recs for discharge planning    -Diet and Exercise: Discussed Mediterranean Diet recommendations (Adopted from Jazmine et al, [place], 2018.). Eat primarily plant-based foods, such as fruits and vegetables, whole grains, legumes (beans) and nuts. Limit refined carbohydrates (white pasta, bread, rice).  Replace butter with healthy fats such as olive oil.  Use herbs and spices instead of salt to flavor foods.  Limit red meat and processed meats to no more than a few times a month. Avoid sugary sodas, bakery goods, and sweets. Eat fish and poultry at least twice a week. Daily exercise (150 minutes per week).  -Patient/Family Stroke Education:  Using BE FAST pnemonic, I went over the usual stroke warning signs and symptoms, and the need to activate EMS (call 911) as soon as the symptoms present including-sudden onset numbness or weakness of the face, arm, or leg; especially on one side of the body; sudden confusion, trouble speaking, or understanding; sudden trouble seeing in one or both eyes; sudden trouble  walking; dizziness; loss of coordination.                           Subjective:     Chief Complaint:  TIA     HPI:   78 y.o. male with a PMHx of open heart surgery, CAD, Afib, DM, anemia, HTN, HLD and history of hepatocellular carcinoma status post ablation presented to the ED for chest tightness and left leg numbness which started on 23rd February that lasted for about 1 hour and had disappeared by the time he was evaluated by the tele stroke physician.  LKW is reported 1154 on February 23rd.  No IV thrombolysis was administered given the resolution of symptoms.  Patient is on Xarelto at home however he has been completely off of anticoagulation due to recent GI bleed in the last 2 weeks.    Past Medical History:  Past Medical History:   Diagnosis Date    Anticoagulant long-term use     Aortic stenosis     Asthma     Benign prostatic hyperplasia with nocturia     Bilateral carotid artery stenosis 07/21/2021    US CAROTID BILATERAL 07/14/2021 IMPRESSION: Atherosclerosis with suspected stenosis greater than 50% at the right proximal ICA visually. Velocities are discordant and appear improved from prior. Atherosclerosis on the left with no evidence of flow-limiting stenosis greater than 50%.  FINDINGS: Right: Internal Carotid Artery (ICA): Peak systolic velocity 118 cm/sec. 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 09/22/2020    DELONTE on CPAP     Osteoarthritis     Paroxysmal atrial fibrillation     PVD (peripheral vascular disease)      Secondary esophageal varices without bleeding 06/26/2017    Type 2 diabetes mellitus       Past Surgical History:  Past Surgical History:   Procedure Laterality Date    ANGIOGRAM, EXTREMITY, UNILATERAL Left 1/4/2024    Procedure: ANGIOGRAM, EXTREMITY, UNILATERAL- Femoral / SMS Stent, Poss General;  Surgeon: ALEX Alfred III, MD;  Location: [address];  Service: Vascular;  Laterality: Left;  mGy : 2698.78  Gy.cm : 229.88  Contrast : 62ml  Fluro time : 13.8min    CARDIAC CATHETERIZATION      CARDIAC SURGERY      CARPAL TUNNEL RELEASE Left     CATARACT EXTRACTION      CATARACT EXTRACTION W/ INTRAOCULAR LENS  IMPLANT, BILATERAL  2008    CATHETERIZATION OF BOTH LEFT AND RIGHT HEART N/A 11/2/2018    Procedure: CATHETERIZATION, HEART, BOTH LEFT AND RIGHT;  Surgeon: Frank Gallardo MD;  Location: [facility] CATH LAB;  Service: Cardiology;  Laterality: N/A;    COLONOSCOPY  8/2013    COLONOSCOPY N/A 5/30/2016    Procedure: COLONOSCOPY;  Surgeon: Anna Tomas MD;  Location: [facility] ENDO;  Service: Endoscopy;  Laterality: N/A;    COLONOSCOPY N/A 7/17/2019    Procedure: COLONOSCOPY;  Surgeon: David Carter III, MD;  Location: [facility] ENDO;  Service: Endoscopy;  Laterality: N/A;    COLONOSCOPY N/A 12/14/2023    Procedure: COLONOSCOPY;  Surgeon: Ama Barrera MD;  Location: [facility] ENDO;  Service: Endoscopy;  Laterality: N/A;    COMPUTED TOMOGRAPHY N/A 9/9/2019    Procedure: CT (COMPUTED TOMOGRAPHY);  Surgeon: [facility];  Location: [facility] OR;  Service: General;  Laterality: N/A;  Microwave ablation to be done in CT.  Need CRNA and cart    COMPUTED TOMOGRAPHY N/A 1/25/2022    Procedure: Liver Microwave Ablation;  Surgeon: Red Puentes MD;  Location: [facility];  Service: General;  Laterality: N/A;    CORONARY ARTERY BYPASS GRAFT (CABG) N/A 11/5/2018    Procedure: CORONARY ARTERY BYPASS GRAFT (CABG);  Surgeon: Bear De Luna MD;  Location: [facility];  Service: Cardiothoracic;  Laterality: N/A;  TWO VESSEL  BYPASS WITH AORTOTOMY AND EXCISION OF ATHEROSCLEROTIC PLAQUE    CORONARY BYPASS GRAFT ANGIOGRAPHY  3/2/2020    Procedure: Bypass graft study;  Surgeon: Cora Cormier MD;  Location: [facility] CATH LAB;  Service: Cardiology;;    ELBOW BURSA SURGERY Right 2010    ENDOSCOPIC HARVEST OF VEIN Left 11/5/2018    Procedure: SURGICAL PROCUREMENT, VEIN, ENDOSCOPIC;  Surgeon: Bear De Luna MD;  Location: [facility] OR;  Service: Cardiothoracic;  Laterality: Left;    ESOPHAGOGASTRODUODENOSCOPY N/A 7/17/2019    Procedure: ESOPHAGOGASTRODUODENOSCOPY (EGD);  Surgeon: David Carter III, MD;  Location: [facility] ENDO;  Service: Endoscopy;  Laterality: N/A;    ESOPHAGOGASTRODUODENOSCOPY N/A 12/29/2022    Procedure: ESOPHAGOGASTRODUODENOSCOPY (EGD);  Surgeon: Issa Gayle MD;  Location: [facility];  Service: Endoscopy;  Laterality: N/A;    ESOPHAGOGASTRODUODENOSCOPY N/A 1/17/2024    Procedure: EGD (ESOPHAGOGASTRODUODENOSCOPY);  Surgeon: Issa Gayle MD;  Location: [facility];  Service: Endoscopy;  Laterality: N/A;    ETHMOIDECTOMY Bilateral 3/27/2019    Procedure: ETHMOIDECTOMY;  Surgeon: Alejandro Parkinson MD;  Location: [facility] OR;  Service: ENT;  Laterality: Bilateral;    EYE SURGERY      FOOT SURGERY      FRONTAL SINUS OBLITERATION Bilateral 3/27/2019    Procedure: SINUSOTOMY, FRONTAL SINUS, OBLITERATIVE;  Surgeon: Alejandro Parkinson MD;  Location: [facility] OR;  Service: ENT;  Laterality: Bilateral;    FUNCTIONAL ENDOSCOPIC SINUS SURGERY (FESS) Bilateral 3/27/2019    Procedure: FESS (FUNCTIONAL ENDOSCOPIC SINUS SURGERY);  Surgeon: Alejandro Parkinson MD;  Location: [facility] OR;  Service: ENT;  Laterality: Bilateral;  Left Keila bullosa resection     INTRALUMINAL GASTROINTESTINAL TRACT IMAGING VIA CAPSULE N/A 2/2/2024    Procedure: IMAGING PROCEDURE, GI TRACT, INTRALUMINAL, VIA CAPSULE;  Surgeon: Cooper Estrella RN;  Location: [facility] ENDO;  Service: Endoscopy;  Laterality: N/A;    KNEE ARTHROSCOPY W/ MENISCAL REPAIR Left 06/2019    dr boykin    KNEE SURGERY  Right     LEFT HEART CATHETERIZATION Left 3/2/2020    Procedure: CATHETERIZATION, HEART, LEFT;  Surgeon: Cora Cormier MD;  Location: [facility] CATH LAB;  Service: Cardiology;  Laterality: Left;    LIVER BIOPSY  01/2022    MAXILLARY ANTROSTOMY Bilateral 3/27/2019    Procedure: MAXILLARY ANTROSTOMY;  Surgeon: Alejandro Parkinson MD;  Location: [facility] OR;  Service: ENT;  Laterality: Bilateral;    NASAL SEPTOPLASTY N/A 3/27/2019    Procedure: SEPTOPLASTY, NOSE;  Surgeon: Alejandro Parkinson MD;  Location: [facility] OR;  Service: ENT;  Laterality: N/A;    RECTAL SURGERY  2012    STENT, SUPERIOR MESENTERIC ARTERY Left 1/4/2024    Procedure: STENT, SUPERIOR MESENTERIC ARTERY;  Surgeon: ALEX Alfred III, MD;  Location: [address];  Service: Vascular;  Laterality: Left;    TRANSURETHRAL RESECTION OF PROSTATE (TURP) WITHOUT USE OF LASER N/A 6/19/2018    Procedure: TURP, WITHOUT USING LASER;  Surgeon: Cooper Cordova IV, MD;  Location: [facility];  Service: Urology;  Laterality: N/A;    TRIGGER FINGER RELEASE Left       Allergies:  Review of patient's allergies indicates:   Allergen Reactions    Lipitor [atorvastatin] Other (See Comments)     Muscle aches and pains as well as fatigue.     Niacin preparations Other (See Comments)     Causes broken blood vessels and bruises at 4 times normal dose.    Statins-hmg-coa reductase inhibitors Other (See Comments)     Statins cause intolerable myalgias.    Iodinated contrast media Hives     Tachycardia    Omeprazole Hives and Itching    Prilosec [omeprazole magnesium] Hives and Itching       Family History:  Family History   Problem Relation Age of Onset    Heart disease Mother     Heart disease Father     Heart disease Brother     Colon cancer Neg Hx       Social History:   reports that he quit smoking about 51 years ago. His smoking use included cigarettes. He started smoking about 55 years ago. He has a 2.0 pack-year smoking history. He quit smokeless tobacco use about 47 years ago.  His smokeless  tobacco use included chew. He reports current alcohol use of about 2.0 standard drinks of alcohol per week. He reports that he does not use drugs.       Review of Systems:  14 point ROS was obtained and is negative except mentioned in HPI    OBJECTIVE:     Vital Signs (Most Recent):  Temp: 98.5 °F (36.9 °C) (02/24/24 1121)  Pulse: 78 (02/24/24 1121)  Resp: 18 (02/24/24 1121)  BP: (!) 152/81 (02/24/24 1121)  SpO2: 98 % (02/24/24 1121) Vital Signs (24h Range):  Temp:  [98 °F (36.7 °C)-98.5 °F (36.9 °C)] 98.5 °F (36.9 °C)  Pulse:  [65-87] 78  Resp:  [14-28] 18  SpO2:  [95 %-100 %] 98 %  BP: (129-177)/(60-85) 152/81       Physical Exam:  Physical Exam: Limited due to telehealth visit  General: well developed, well nourished  Head/Neck: atraumatic  Eyes: clear sclera  Respiratory: normal respiratory effort  Skin: clear skin, no visible bruising or rash    Neuro:   LOC: alert and follows requests  Language: No aphasia  Speech: No dysarthria  Orientation: Person, Place, Time  Memory: Recent memory intact, Remote memory intact, Age correct, Month correct  Visual Fields (CN II): Unable to obtain due to televisit  EOM (CN III, IV, VI): Full/intact  Oculocephalics: normal  Pupils (CN III, IV, VI): Unable to obtain due to televisit  Facial Sensation (CN V): Unable to obtain due to televisit  Facial Movement (CN VII): symmetric facial expression  Hearing (CN VIII): intact bilaterally  Gag Reflex (CN IX, X): Unable to obtain due to televisit  Shoulder/Neck (CN XI): Shoulder Shrug: Normal/Symmetric  Tongue (CN XII): to midline  Reflexes: not examined  Motor: No drift or obvious weakness noted on exam  Cerebellar: Normal limb, Normal stance  Sensation: Unable to obtain due to televisit    Labs:  CBC/Anemia Profile:   Recent Labs   Lab 02/23/24  1223 02/24/24  0559   WBC 4.40 3.48*   HGB 8.2* 9.1*   HCT 27.2* 30.1*    198   MCV 79* 80*   RDW 17.1* 16.8*   IRON 11*  --    FERRITIN 13*  --         Coags:   Recent Labs   Lab  02/23/24  1324 02/24/24  0559   INR 1.0 1.0   APTT  --  28.7        Chemistries:   Recent Labs   Lab 02/23/24  1223 02/24/24  0559    141   K 4.0 4.0    108   CO2 25 24   BUN 15 10   CREATININE 1.0 0.9   CALCIUM 9.2 8.7   PROT 7.3 6.8   BILITOT 0.4 0.5   ALKPHOS 76 73   ALT 15 12   AST 19 19   MG  --  2.0   PHOS  --  3.7        Medications:  Scheduled Meds:   arformoteroL  15 mcg Nebulization BID    budesonide  0.5 mg Nebulization Q12H    carvediloL  6.25 mg Oral BID    finasteride  5 mg Oral QHS    levothyroxine  300 mcg Oral QAM    ranolazine  500 mg Oral BID     Continuous Infusions:  PRN Meds:.0.9%  NaCl infusion (for blood administration), acetaminophen, aluminum-magnesium hydroxide-simethicone, bisacodyL, dextrose 10%, dextrose 10%, glucagon (human recombinant), glucose, glucose, insulin aspart U-100, labetalol, melatonin, morphine, naloxone, nitroGLYCERIN, ondansetron, sodium chloride 0.9%, sodium chloride 0.9%      Current Facility-Administered Medications:     0.9%  NaCl infusion (for blood administration), , Intravenous, Q24H PRN, Radha King NP    acetaminophen tablet 650 mg, 650 mg, Oral, Q4H PRN, Radha King NP    aluminum-magnesium hydroxide-simethicone 200-200-20 mg/5 mL suspension 30 mL, 30 mL, Oral, QID PRN, Radha King NP    arformoteroL nebulizer solution 15 mcg, 15 mcg, Nebulization, BID, 15 mcg at 02/24/24 0726 **AND** Inhalation Treatment BID, , , BID, Magalis Machado MD    bisacodyL suppository 10 mg, 10 mg, Rectal, Daily PRN, Radha King, NP    budesonide nebulizer solution 0.5 mg, 0.5 mg, Nebulization, Q12H, Magalis Machado MD, 0.5 mg at 02/24/24 0728    carvediloL tablet 6.25 mg, 6.25 mg, Oral, BID, Jono Obregon MD, 6.25 mg at 02/24/24 0857    dextrose 10% bolus 125 mL 125 mL, 12.5 g, Intravenous, PRN, Radha King, NP    dextrose 10% bolus 250 mL 250 mL, 25 g, Intravenous, PRN, Radha King, NP    finasteride tablet 5 mg, 5 mg,  Oral, QHS, Christine April KENRICK., NP    glucagon (human recombinant) injection 1 mg, 1 mg, Intramuscular, PRN, Christine, April J., NP    glucose chewable tablet 16 g, 16 g, Oral, PRN, Normand, April J., NP    glucose chewable tablet 24 g, 24 g, Oral, PRN, Christine April KENRICK., NP    insulin aspart U-100 pen 0-10 Units, 0-10 Units, Subcutaneous, QID (AC + HS) PRN, Christine, April KENRICK., NP    labetaloL injection 10 mg, 10 mg, Intravenous, Q15 Min PRN, Christine, April J., NP    levothyroxine tablet 300 mcg, 300 mcg, Oral, QAM, Normand, April J., NP, 300 mcg at 02/24/24 0606    melatonin tablet 6 mg, 6 mg, Oral, Nightly PRN, Christine, April J., NP    morphine injection 2 mg, 2 mg, Intravenous, Q4H PRN, Normand, April J., NP    naloxone 0.4 mg/mL injection 0.02 mg, 0.02 mg, Intravenous, PRN, Normand, April J., NP    nitroGLYCERIN SL tablet 0.4 mg, 0.4 mg, Sublingual, Q5 Min PRN, Normand, April J., NP    ondansetron injection 4 mg, 4 mg, Intravenous, Q8H PRN, Christine, April J., NP    ranolazine 12 hr tablet 500 mg, 500 mg, Oral, BID, Jono Obregon MD, 500 mg at 02/24/24 0857    sodium chloride 0.9% flush 10 mL, 10 mL, Intravenous, Q12H PRN, Christine, April J., NP    sodium chloride 0.9% flush 10 mL, 10 mL, Intravenous, PRN, Christine, April KENRICK., NP    Facility-Administered Medications Ordered in Other Encounters:     lactated ringers infusion, , Intravenous, Continuous, Omaira Theodore MD, New Bag at 09/09/19 1117  Prior to Admission medications    Medication Sig Start Date End Date Taking? Authorizing Provider   albuterol (VENTOLIN HFA) 90 mcg/actuation inhaler Inhale 2 puffs into the lungs every 6 (six) hours as needed for Wheezing or Shortness of Breath. Rescue 11/10/23  Yes Rishi Molina MD   amLODIPine (NORVASC) 10 MG tablet TAKE 1 TABLET BY MOUTH ONCE A DAY (DOSE INCREASE)  Patient taking differently: Take by mouth every morning. 3/29/22  Yes Frank Gallardo MD   budesonide-formoterol 160-4.5 mcg (SYMBICORT)  160-4.5 mcg/actuation HFAA INHALE 2 PUFFS INTO THE LUNGS TWO TIMES A DAY. 11/10/23  Yes Rishi Molina MD   empagliflozin (JARDIANCE) 25 mg tablet Take 1 tablet (25 mg total) by mouth once daily.  Patient taking differently: Take 25 mg by mouth every morning. 10/23/23  Yes Bhupinder Callaway MD   finasteride (PROSCAR) 5 mg tablet TAKE 1 TABLET BY MOUTH once daily 8/8/23  Yes Bhupinder Callaway MD   furosemide (LASIX) 40 MG tablet Take 1 tablet (40 mg total) by mouth 2 (two) times daily. 10/9/23 10/8/24 Yes Frank Gallardo MD   GLUCOSAMINE HCL/CHONDR ROSARIO A NA (OSTEO BI-FLEX ORAL) Take 1 tablet by mouth 2 (two) times daily.    Yes Provider, Historical   levocetirizine (XYZAL) 5 MG tablet Take 1 tablet (5 mg total) by mouth every evening. 11/30/22  Yes Tracy Schneider MD   levothyroxine (SYNTHROID) 300 MCG Tab TAKE 1 TABLET BY MOUTH EVERY MORNING 2/9/24  Yes Bhupinder Callaway MD   lisinopriL 10 MG tablet TAKE 1 BY MOUTH EVERY DAY  Patient taking differently: Take 10 mg by mouth every morning. 11/29/23  Yes Frank Gallardo MD   metFORMIN (GLUCOPHAGE-XR) 500 MG ER 24hr tablet Take 1 tablet (500 mg total) by mouth 2 (two) times daily with meals. 10/16/23 10/15/24 Yes Kayleigh Paulino NP   montelukast (SINGULAIR) 10 mg tablet Take 1 tablet (10 mg total) by mouth once daily. 2/16/24 2/15/25 Yes Ann May PA-C   propafenone (RHTHYMOL) 150 MG Tab Take 1 tablet (150 mg total) by mouth every 8 (eight) hours. 8/17/23 8/16/24 Yes Frank Gallardo MD   RABEprazole (ACIPHEX) 20 mg tablet Take 1 tablet (20 mg total) by mouth 2 (two) times daily. 10/10/23  Yes Bhupinder Callaway MD   carvediloL (COREG) 3.125 MG tablet TAKE 1 TABLET BY MOUTH TWICE DAILY WITH FOOD 2/16/24 2/24/24 Yes Anna Wood MD   ACCU-CHEK GRACE PLUS METER Misc AS DIRECTED 1/31/24   Bhupinder Callaway MD   aspirin (ECOTRIN) 81 MG EC tablet Take 1 tablet (81 mg total) by mouth once daily. 2/24/24 3/25/24  Lauri  "Sepideh NAVARRETE MD   blood sugar diagnostic (ACCU-CHEK GRACE PLUS TEST STRP) Strp TEST THREE TIMES A DAY 24   Bhupinder Callaway MD   carvediloL (COREG) 3.125 MG tablet Take 2 tablets (6.25 mg total) by mouth 2 (two) times daily. 2/24/24 3/25/24  Sepideh Carreon MD   insulin (LANTUS SOLOSTAR U-100 INSULIN) glargine 100 units/mL SubQ pen Inject 44 Units into the skin every evening. 23   Bhupinder Callaway MD   lancets (ACCU-CHEK FASTCLIX LANCET DRUM) Misc TEST TWO TIMES A DAY 24   Bhupinder Callaway MD   pen needle, diabetic (BD ULTRA-FINE MINI PEN NEEDLE) 31 gauge x 3/16" Ndle USE AS DIRECTED 24   Bhupinder Callaway MD   ranolazine (RANEXA) 500 MG Tb12 Take 1 tablet (500 mg total) by mouth 2 (two) times daily. 24  Sepideh Carreon MD   REPATHA SURECLICK 140 mg/mL PnIj SMARTSI Milligram(s) Topical Every 2 Weeks 23   Provider, Historical   sildenafiL (VIAGRA) 100 MG tablet Take 1/2 to 1 tablet by mouth one hour prior to intercourse. Max 100mg per day 23   Bhupinder Callaway MD        Imaging:   Imaging: Images were reviewed remotely as they were acquired.    CTH:  No acute intracranial abnormality  MRA Head:  No flow-limiting stenosis/occlusion  Carotid ultrasound bilateral:  50-70% right ICA and less than 50% left ICA stenosis  MRI:  No evidence of acute infarct  ECHO: EF 60-65%. LA size:  Severely dilated. Bubble study: not done         Strokelabs: LDL:  LDL Cholesterol   Date Value Ref Range Status   2024 28.2 (L) 63.0 - 159.0 mg/dL Final     Comment:     The National Cholesterol Education Program (NCEP) has set the  following guidelines (reference values) for LDL Cholesterol:  Optimal.......................<130 mg/dL  Borderline High...............130-159 mg/dL  High..........................160-189 mg/dL  Very High.....................>190 mg/dL       TSH:   Lab Results   Component Value Date    TSH 1.434 2024    "           Thank you for your consult.               Jorge Luis Giron MD, MHA  Fellow, NeuroEndovascular Surgery, [facility]  Neurologist, [facility] [place], [place]

## 2024-02-24 NOTE — PLAN OF CARE
Discussed POC with patient, Pt verbalized understanding, purposeful rounding,  Cardiac monitoring in use, Fall precautions in place, patient remains injury free, call light within reach, will continue with plan of care.     Problem: Adult Inpatient Plan of Care  Goal: Plan of Care Review  Outcome: Ongoing, Progressing  Goal: Patient-Specific Goal (Individualized)  Outcome: Ongoing, Progressing  Goal: Absence of Hospital-Acquired Illness or Injury  Outcome: Ongoing, Progressing  Goal: Optimal Comfort and Wellbeing  Outcome: Ongoing, Progressing  Goal: Readiness for Transition of Care  Outcome: Ongoing, Progressing

## 2024-02-26 ENCOUNTER — HOSPITAL ENCOUNTER (EMERGENCY)
Facility: HOSPITAL | Age: 79
Discharge: HOME | End: 2024-02-26
Attending: EMERGENCY MEDICINE
Payer: MEDICARE

## 2024-02-26 VITALS
TEMPERATURE: 99 F | DIASTOLIC BLOOD PRESSURE: 82 MMHG | BODY MASS INDEX: 31.73 KG/M2 | OXYGEN SATURATION: 98 % | WEIGHT: 202.63 LBS | RESPIRATION RATE: 16 BRPM | HEART RATE: 75 BPM | SYSTOLIC BLOOD PRESSURE: 177 MMHG

## 2024-02-26 DIAGNOSIS — M79.605 LEFT LEG PAIN: ICD-10-CM

## 2024-02-26 DIAGNOSIS — R20.0 LEFT LEG NUMBNESS: ICD-10-CM

## 2024-02-26 LAB
ALBUMIN SERPL BCP-MCNC: 3.9 G/DL (ref 3.5–5.2)
ALP SERPL-CCNC: 90 U/L (ref 55–135)
ALT SERPL W/O P-5'-P-CCNC: 13 U/L (ref 10–44)
ANION GAP SERPL CALC-SCNC: 10 MMOL/L (ref 8–16)
AST SERPL-CCNC: 19 U/L (ref 10–40)
BASOPHILS # BLD AUTO: 0.03 K/UL (ref 0–0.2)
BASOPHILS NFR BLD: 0.6 % (ref 0–1.9)
BILIRUB SERPL-MCNC: 0.3 MG/DL (ref 0.1–1)
BUN SERPL-MCNC: 16 MG/DL (ref 8–23)
CALCIUM SERPL-MCNC: 9 MG/DL (ref 8.7–10.5)
CHLORIDE SERPL-SCNC: 106 MMOL/L (ref 95–110)
CO2 SERPL-SCNC: 25 MMOL/L (ref 23–29)
CREAT SERPL-MCNC: 1.2 MG/DL (ref 0.5–1.4)
D DIMER PPP IA.FEU-MCNC: 1.38 MG/L FEU
DIFFERENTIAL METHOD BLD: ABNORMAL
EOSINOPHIL # BLD AUTO: 0.2 K/UL (ref 0–0.5)
EOSINOPHIL NFR BLD: 3.4 % (ref 0–8)
ERYTHROCYTE [DISTWIDTH] IN BLOOD BY AUTOMATED COUNT: 17.2 % (ref 11.5–14.5)
EST. GFR  (NO RACE VARIABLE): >60 ML/MIN/1.73 M^2
GLUCOSE SERPL-MCNC: 116 MG/DL (ref 70–110)
HCT VFR BLD AUTO: 31.2 % (ref 40–54)
HGB BLD-MCNC: 9.5 G/DL (ref 14–18)
IMM GRANULOCYTES # BLD AUTO: 0.01 K/UL (ref 0–0.04)
IMM GRANULOCYTES NFR BLD AUTO: 0.2 % (ref 0–0.5)
LYMPHOCYTES # BLD AUTO: 1.5 K/UL (ref 1–4.8)
LYMPHOCYTES NFR BLD: 29.2 % (ref 18–48)
MCH RBC QN AUTO: 24.1 PG (ref 27–31)
MCHC RBC AUTO-ENTMCNC: 30.4 G/DL (ref 32–36)
MCV RBC AUTO: 79 FL (ref 82–98)
MONOCYTES # BLD AUTO: 0.7 K/UL (ref 0.3–1)
MONOCYTES NFR BLD: 13.6 % (ref 4–15)
NEUTROPHILS # BLD AUTO: 2.7 K/UL (ref 1.8–7.7)
NEUTROPHILS NFR BLD: 53 % (ref 38–73)
NRBC BLD-RTO: 0 /100 WBC
PLATELET # BLD AUTO: 213 K/UL (ref 150–450)
PMV BLD AUTO: 10.1 FL (ref 9.2–12.9)
POTASSIUM SERPL-SCNC: 3.8 MMOL/L (ref 3.5–5.1)
PROT SERPL-MCNC: 7.7 G/DL (ref 6–8.4)
RBC # BLD AUTO: 3.94 M/UL (ref 4.6–6.2)
SODIUM SERPL-SCNC: 141 MMOL/L (ref 136–145)
WBC # BLD AUTO: 5 K/UL (ref 3.9–12.7)

## 2024-02-26 PROCEDURE — 85025 COMPLETE CBC W/AUTO DIFF WBC: CPT | Performed by: NURSE PRACTITIONER

## 2024-02-26 PROCEDURE — 80053 COMPREHEN METABOLIC PANEL: CPT | Performed by: NURSE PRACTITIONER

## 2024-02-26 PROCEDURE — 85379 FIBRIN DEGRADATION QUANT: CPT | Performed by: NURSE PRACTITIONER

## 2024-02-26 PROCEDURE — 99285 EMERGENCY DEPT VISIT HI MDM: CPT | Mod: 25

## 2024-02-26 NOTE — FIRST PROVIDER EVALUATION
Medical screening examination initiated.  I have conducted a focused provider triage encounter, findings are as follows:    Brief history of present illness:  Who presents with left leg pain, numbness and tingling starting this morning.    Vitals:    02/26/24 1649   BP: (!) 215/94   BP Location: Right arm   Patient Position: Sitting   Pulse: 69   Resp: 16   Temp: 98.9 °F (37.2 °C)   TempSrc: Oral   SpO2: 95%   Weight: 91.9 kg (202 lb 9.6 oz)       Pertinent physical exam:  Hypertensive    Brief workup plan:  Labs, imaging    Preliminary workup initiated; this workup will be continued and followed by the physician or advanced practice provider that is assigned to the patient when roomed.

## 2024-02-27 ENCOUNTER — TELEPHONE (OUTPATIENT)
Dept: INTERNAL MEDICINE | Facility: CLINIC | Age: 79
End: 2024-02-27
Payer: MEDICARE

## 2024-02-27 NOTE — DISCHARGE INSTRUCTIONS
Your numbness may be related to a disc in her back or a pinched nerve.  Your workup here today is negative and your MRI done in the hospital when her symptoms initially started is negative for stroke.  Please continue all your home medications and follow up with her doctor as scheduled.  Return as needed

## 2024-02-27 NOTE — ED PROVIDER NOTES
"SCRIBE #1 NOTE: I, Mulu Alvarez, am scribing for, and in the presence of, Daryl Arevalo Jr., MD. I have scribed the entire note.       History     Chief Complaint   Patient presents with    leg numbness     Stated he had left leg numbness the last time he was here in ER which was 3 days ago and had multiple tests done. Admitted overnight.  Numbness went away. Numbness came back yesterday and again today. Also reports "twinge of numbness to face a couple of times today" denies numbness to face at present. No loss of strength, pt walked into triage without difficulty. Denies vision or speech problems.      Review of patient's allergies indicates:   Allergen Reactions    Lipitor [atorvastatin] Other (See Comments)     Muscle aches and pains as well as fatigue.     Niacin preparations Other (See Comments)     Causes broken blood vessels and bruises at 4 times normal dose.    Statins-hmg-coa reductase inhibitors Other (See Comments)     Statins cause intolerable myalgias.    Iodinated contrast media Hives     Tachycardia    Omeprazole Hives and Itching    Prilosec [omeprazole magnesium] Hives and Itching         History of Present Illness     HPI    2/26/2024, 8:28 PM  History obtained from the patient      History of Present Illness: Erendira Pretty is a 78 y.o. male patient with a PMHx of long-term anticoagulant use, CAD, aortic stenosis, paroxysmal A-fib, asthma, COPD, HLD, HTN, and DM2 who presents to the Emergency Department for evaluation of numbness to the LLE which onset at approximately 10:00 this morning while he was doing light yard work. The patient was seen here on 2/23 for evaluation of similar symptoms and was admitted for TIA workup. His numbness resolved and he was discharged on 2/24 but it returned today. Symptoms are constant and moderate in severity. No mitigating or exacerbating factors reported. Patient denies any speech difficulty, visual disturbance, weakness, headache, back pain, and all other " sxs at this time. No prior Tx reported. No further complaints or concerns at this time.       Arrival mode: Personal vehicle    PCP: Bhupinder Callaway MD        Past Medical History:  Past Medical History:   Diagnosis Date    Anticoagulant long-term use     Aortic stenosis     Asthma     Benign prostatic hyperplasia with nocturia     Bilateral carotid artery stenosis 07/21/2021    US CAROTID BILATERAL 07/14/2021 IMPRESSION: Atherosclerosis with suspected stenosis greater than 50% at the right proximal ICA visually. Velocities are discordant and appear improved from prior. Atherosclerosis on the left with no evidence of flow-limiting stenosis greater than 50%.  FINDINGS: Right: Internal Carotid Artery (ICA): Peak systolic velocity 118 cm/sec. 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 09/22/2020    DELONTE on CPAP     Osteoarthritis     Paroxysmal atrial fibrillation     PVD (peripheral vascular disease)     Secondary esophageal varices without bleeding 06/26/2017    Type 2 diabetes mellitus        Past Surgical History:  Past Surgical History:   Procedure Laterality Date    ANGIOGRAM, EXTREMITY, UNILATERAL Left 1/4/2024    Procedure: ANGIOGRAM, EXTREMITY, UNILATERAL- Femoral / SMS Stent, Poss General;  Surgeon: ALEX Alfred III, MD;  Location: [facility] OR [address] [place] [address];  Service: Vascular;  Laterality: Left;  mGy : 2698.78  Gy.cm : 229.88  Contrast : 62ml  Fluro time : 13.8min    CARDIAC CATHETERIZATION      CARDIAC SURGERY      CARPAL TUNNEL  RELEASE Left     CATARACT EXTRACTION      CATARACT EXTRACTION W/ INTRAOCULAR LENS  IMPLANT, BILATERAL  2008    CATHETERIZATION OF BOTH LEFT AND RIGHT HEART N/A 11/2/2018    Procedure: CATHETERIZATION, HEART, BOTH LEFT AND RIGHT;  Surgeon: Frank Gallardo MD;  Location: [facility] CATH LAB;  Service: Cardiology;  Laterality: N/A;    COLONOSCOPY  8/2013    COLONOSCOPY N/A 5/30/2016    Procedure: COLONOSCOPY;  Surgeon: Anna Tomas MD;  Location: [facility] ENDO;  Service: Endoscopy;  Laterality: N/A;    COLONOSCOPY N/A 7/17/2019    Procedure: COLONOSCOPY;  Surgeon: David Carter III, MD;  Location: [facility] ENDO;  Service: Endoscopy;  Laterality: N/A;    COLONOSCOPY N/A 12/14/2023    Procedure: COLONOSCOPY;  Surgeon: Ama Barrera MD;  Location: [facility] ENDO;  Service: Endoscopy;  Laterality: N/A;    COMPUTED TOMOGRAPHY N/A 9/9/2019    Procedure: CT (COMPUTED TOMOGRAPHY);  Surgeon: [facility] Diagnostic Provider;  Location: [facility] OR;  Service: General;  Laterality: N/A;  Microwave ablation to be done in CT.  Need CRNA and cart    COMPUTED TOMOGRAPHY N/A 1/25/2022    Procedure: Liver Microwave Ablation;  Surgeon: Red Puentes MD;  Location: [facility];  Service: General;  Laterality: N/A;    CORONARY ARTERY BYPASS GRAFT (CABG) N/A 11/5/2018    Procedure: CORONARY ARTERY BYPASS GRAFT (CABG);  Surgeon: Bear De Luna MD;  Location: [facility];  Service: Cardiothoracic;  Laterality: N/A;  TWO VESSEL BYPASS WITH AORTOTOMY AND EXCISION OF ATHEROSCLEROTIC PLAQUE    CORONARY BYPASS GRAFT ANGIOGRAPHY  3/2/2020    Procedure: Bypass graft study;  Surgeon: Cora Cormier MD;  Location: [facility] CATH LAB;  Service: Cardiology;;    ELBOW BURSA SURGERY Right 2010    ENDOSCOPIC HARVEST OF VEIN Left 11/5/2018    Procedure: SURGICAL PROCUREMENT, VEIN, ENDOSCOPIC;  Surgeon: Bear De Luna MD;  Location: [facility] OR;  Service: Cardiothoracic;  Laterality: Left;    ESOPHAGOGASTRODUODENOSCOPY N/A 7/17/2019    Procedure: ESOPHAGOGASTRODUODENOSCOPY (EGD);   Surgeon: David Carter III, MD;  Location: [facility] ENDO;  Service: Endoscopy;  Laterality: N/A;    ESOPHAGOGASTRODUODENOSCOPY N/A 12/29/2022    Procedure: ESOPHAGOGASTRODUODENOSCOPY (EGD);  Surgeon: Issa Gayle MD;  Location: [facility] ENDO;  Service: Endoscopy;  Laterality: N/A;    ESOPHAGOGASTRODUODENOSCOPY N/A 1/17/2024    Procedure: EGD (ESOPHAGOGASTRODUODENOSCOPY);  Surgeon: Issa Gayle MD;  Location: [facility] ENDO;  Service: Endoscopy;  Laterality: N/A;    ETHMOIDECTOMY Bilateral 3/27/2019    Procedure: ETHMOIDECTOMY;  Surgeon: Alejandro Parkinson MD;  Location: [facility] OR;  Service: ENT;  Laterality: Bilateral;    EYE SURGERY      FOOT SURGERY      FRONTAL SINUS OBLITERATION Bilateral 3/27/2019    Procedure: SINUSOTOMY, FRONTAL SINUS, OBLITERATIVE;  Surgeon: Alejandro Parkinson MD;  Location: [facility] OR;  Service: ENT;  Laterality: Bilateral;    FUNCTIONAL ENDOSCOPIC SINUS SURGERY (FESS) Bilateral 3/27/2019    Procedure: FESS (FUNCTIONAL ENDOSCOPIC SINUS SURGERY);  Surgeon: Alejandro Parkinson MD;  Location: [facility] OR;  Service: ENT;  Laterality: Bilateral;  Left Keila bullosa resection     INTRALUMINAL GASTROINTESTINAL TRACT IMAGING VIA CAPSULE N/A 2/2/2024    Procedure: IMAGING PROCEDURE, GI TRACT, INTRALUMINAL, VIA CAPSULE;  Surgeon: Cooper Estrella RN;  Location: [facility] ENDO;  Service: Endoscopy;  Laterality: N/A;    KNEE ARTHROSCOPY W/ MENISCAL REPAIR Left 06/2019    dr boykin    KNEE SURGERY Right     LEFT HEART CATHETERIZATION Left 3/2/2020    Procedure: CATHETERIZATION, HEART, LEFT;  Surgeon: Cora Cormier MD;  Location: [facility] CATH LAB;  Service: Cardiology;  Laterality: Left;    LIVER BIOPSY  01/2022    MAXILLARY ANTROSTOMY Bilateral 3/27/2019    Procedure: MAXILLARY ANTROSTOMY;  Surgeon: Alejandro Parkinson MD;  Location: [facility] OR;  Service: ENT;  Laterality: Bilateral;    NASAL SEPTOPLASTY N/A 3/27/2019    Procedure: SEPTOPLASTY, NOSE;  Surgeon: Alejandro Parkinson MD;  Location: [facility] OR;  Service: ENT;  Laterality: N/A;    RECTAL SURGERY       STENT, SUPERIOR MESENTERIC ARTERY Left 2024    Procedure: STENT, SUPERIOR MESENTERIC ARTERY;  Surgeon: ALEX Alfred III, MD;  Location: [facility] OR [address] [place] [address];  Service: Vascular;  Laterality: Left;    TRANSURETHRAL RESECTION OF PROSTATE (TURP) WITHOUT USE OF LASER N/A 2018    Procedure: TURP, WITHOUT USING LASER;  Surgeon: Cooper Cordova IV, MD;  Location: [facility] OR;  Service: Urology;  Laterality: N/A;    TRIGGER FINGER RELEASE Left          Family History:  Family History   Problem Relation Age of Onset    Heart disease Mother     Heart disease Father     Heart disease Brother     Colon cancer Neg Hx        Social History:  Social History     Tobacco Use    Smoking status: Former     Current packs/day: 0.00     Average packs/day: 0.5 packs/day for 4.0 years (2.0 ttl pk-yrs)     Types: Cigarettes     Start date: 1968     Quit date: 1972     Years since quittin.7    Smokeless tobacco: Former     Types: Chew     Quit date: 1976   Substance and Sexual Activity    Alcohol use: Yes     Alcohol/week: 2.0 standard drinks of alcohol     Types: 2 Cans of beer per week    Drug use: No    Sexual activity: Yes     Partners: Female        Review of Systems     Review of Systems   Constitutional:  Negative for fever.   HENT:  Negative for sore throat.    Eyes:  Negative for visual disturbance.   Respiratory:  Negative for shortness of breath.    Cardiovascular:  Negative for chest pain.   Gastrointestinal:  Negative for nausea.   Genitourinary:  Negative for dysuria.   Musculoskeletal:  Negative for back pain.   Skin:  Negative for rash.   Neurological:  Positive for numbness (LLE). Negative for speech difficulty, weakness and headaches.   Hematological:  Does not bruise/bleed easily.   All other systems reviewed and are negative.     Physical Exam     Initial Vitals [24 1649]   BP Pulse Resp Temp SpO2   (!) 215/94 69 16 98.9 °F (37.2 °C) 95 %      MAP       --          Physical  Exam  Nursing Notes and Vital Signs Reviewed.  Constitutional: Patient is in no acute distress. Well-developed and well-nourished.  Head: Atraumatic. Normocephalic.  Eyes:  EOM intact.  No scleral icterus.  ENT: Mucous membranes are moist.  Nares clear   Neck:  Full ROM. No JVD.  Cardiovascular: Regular rate. Regular rhythm No murmurs, rubs, or gallops. Distal pulses are 2+ and symmetric  Pulmonary/Chest: No respiratory distress. Clear to auscultation bilaterally. No wheezing or rales.  Equal chest wall rise bilaterally  Abdominal: Soft and non-distended.  There is no tenderness.  No rebound, guarding, or rigidity. Good bowel sounds.  No palpable pulsatile abdominal mass  Genitourinary: No CVA tenderness.  No suprapubic tenderness  Musculoskeletal: Moves all extremities. No obvious deformities.  5 x 5 strength in all extremities .  There is no point C/T/L/S tenderness.  Normal spinal curvature.  5 x 5 strength in all extremities.  Sensation appears intact in both legs.  Has no calf swelling.  Normal DP and PT pulses bilaterally with normal cap refill.  Skin turgor normal  Skin: Warm and dry.  Neurological:  Alert, awake, and appropriate.  Normal speech.  No acute focal neurological deficits are appreciated.  Two through 12 intact bilaterally.  Psychiatric: Normal affect. Good eye contact. Appropriate in content.     ED Course   Procedures  ED Vital Signs:  Vitals:    02/26/24 1649 02/26/24 2025 02/26/24 2032 02/26/24 2210   BP: (!) 215/94 (!) 156/74 (!) 159/76 (!) 182/86   Pulse: 69 83 71 71   Resp: 16  20 14   Temp: 98.9 °F (37.2 °C)      TempSrc: Oral      SpO2: 95% 99% 100% 100%   Weight: 91.9 kg (202 lb 9.6 oz)       02/26/24 2232   BP: (!) 177/82   Pulse: 75   Resp: 16   Temp:    TempSrc:    SpO2: 98%   Weight:        Abnormal Lab Results:  Labs Reviewed   CBC W/ AUTO DIFFERENTIAL - Abnormal; Notable for the following components:       Result Value    RBC 3.94 (*)     Hemoglobin 9.5 (*)     Hematocrit 31.2 (*)      MCV 79 (*)     MCH 24.1 (*)     MCHC 30.4 (*)     RDW 17.2 (*)     All other components within normal limits   COMPREHENSIVE METABOLIC PANEL - Abnormal; Notable for the following components:    Glucose 116 (*)     All other components within normal limits   D DIMER, QUANTITATIVE - Abnormal; Notable for the following components:    D-Dimer 1.38 (*)     All other components within normal limits        All Lab Results:  Results for orders placed or performed during the hospital encounter of 02/26/24   CBC auto differential   Result Value Ref Range    WBC 5.00 3.90 - 12.70 K/uL    RBC 3.94 (L) 4.60 - 6.20 M/uL    Hemoglobin 9.5 (L) 14.0 - 18.0 g/dL    Hematocrit 31.2 (L) 40.0 - 54.0 %    MCV 79 (L) 82 - 98 fL    MCH 24.1 (L) 27.0 - 31.0 pg    MCHC 30.4 (L) 32.0 - 36.0 g/dL    RDW 17.2 (H) 11.5 - 14.5 %    Platelets 213 150 - 450 K/uL    MPV 10.1 9.2 - 12.9 fL    Immature Granulocytes 0.2 0.0 - 0.5 %    Gran # (ANC) 2.7 1.8 - 7.7 K/uL    Immature Grans (Abs) 0.01 0.00 - 0.04 K/uL    Lymph # 1.5 1.0 - 4.8 K/uL    Mono # 0.7 0.3 - 1.0 K/uL    Eos # 0.2 0.0 - 0.5 K/uL    Baso # 0.03 0.00 - 0.20 K/uL    nRBC 0 0 /100 WBC    Gran % 53.0 38.0 - 73.0 %    Lymph % 29.2 18.0 - 48.0 %    Mono % 13.6 4.0 - 15.0 %    Eosinophil % 3.4 0.0 - 8.0 %    Basophil % 0.6 0.0 - 1.9 %    Differential Method Automated    Comprehensive metabolic panel   Result Value Ref Range    Sodium 141 136 - 145 mmol/L    Potassium 3.8 3.5 - 5.1 mmol/L    Chloride 106 95 - 110 mmol/L    CO2 25 23 - 29 mmol/L    Glucose 116 (H) 70 - 110 mg/dL    BUN 16 8 - 23 mg/dL    Creatinine 1.2 0.5 - 1.4 mg/dL    Calcium 9.0 8.7 - 10.5 mg/dL    Total Protein 7.7 6.0 - 8.4 g/dL    Albumin 3.9 3.5 - 5.2 g/dL    Total Bilirubin 0.3 0.1 - 1.0 mg/dL    Alkaline Phosphatase 90 55 - 135 U/L    AST 19 10 - 40 U/L    ALT 13 10 - 44 U/L    eGFR >60 >60 mL/min/1.73 m^2    Anion Gap 10 8 - 16 mmol/L   D dimer, quantitative   Result Value Ref Range    D-Dimer 1.38 (H) <0.50 mg/L  FEU     *Note: Due to a large number of results and/or encounters for the requested time period, some results have not been displayed. A complete set of results can be found in Results Review.         Imaging Results:  Imaging Results              US Lower Extremity Arteries Left (Final result)  Result time 02/26/24 22:14:11      Final result by Radames Steiner MD (02/26/24 22:14:11)                   Impression:      Mild moderate diffuse plaque throughout the left lower extremity arterial vasculature.  No significant elevated peak systolic velocities.  Predominantly biphasic waveforms throughout.  See sonographic  worksheet for velocity measurements.      Electronically signed by: Radames Steiner  Date:    02/26/2024  Time:    22:14               Narrative:    EXAMINATION:  US LOWER EXTREMITY ARTERIES LEFT    CLINICAL HISTORY:  Anesthesia of skin    TECHNIQUE:  Arterial Doppler ultrasound of the left lower extremity    COMPARISON:  None    FINDINGS:  See below                                       CT Lumbar Spine Without Contrast (Final result)  Result time 02/26/24 21:07:05      Final result by Radames Steiner MD (02/26/24 21:07:05)                   Impression:      No acute fracture or dislocation    Mild degenerative joint disease    Vascular calcification    SMA stent.    All CT scans at this facility are performed  using dose modulation techniques as appropriate to performed exam including the following:  automated exposure control; adjustment of mA and/or kV according to the patients size (this includes techniques or standardized protocols for targeted exams where dose is matched to indication/reason for exam: i.e. extremities or head);  iterative reconstruction technique.      Electronically signed by: Radames Steiner  Date:    02/26/2024  Time:    21:07               Narrative:    EXAMINATION:  CT LUMBAR SPINE WITHOUT CONTRAST    CLINICAL HISTORY:  Low back pain, progressive neurologic  deficit;    TECHNIQUE:  CT lumbar spine without    COMPARISON:  None    FINDINGS:  No vertebral body compression deformity.  Normal bone mineral density.  Normal alignment of the vertebral bodies.  No soft tissue abnormality.  No significant degenerative disc disease.  SMA stent.  Mild degenerative joint disease.  Extensive atherosclerotic disease                                       US Lower Extremity Veins Left (Final result)  Result time 02/26/24 18:45:25      Final result by Radames Steiner MD (02/26/24 18:45:25)                   Impression:      No evidence of deep venous thrombosis in the left lower extremity.      Electronically signed by: Radames Steiner  Date:    02/26/2024  Time:    18:45               Narrative:    EXAMINATION:  US LOWER EXTREMITY VEINS LEFT    CLINICAL HISTORY:  Pain in left leg    TECHNIQUE:  Duplex and color flow Doppler evaluation and graded compression of the left lower extremity veins was performed.    COMPARISON:  None    FINDINGS:  Left thigh veins: The common femoral, femoral, popliteal, upper greater saphenous, and deep femoral veins are patent and free of thrombus. The veins are normally compressible and have normal phasic flow and augmentation response.    Left calf veins: The visualized calf veins are patent.    Contralateral CFV: The contralateral (right) common femoral vein is patent and free of thrombus.    Miscellaneous: None                                       The EKG was ordered, reviewed, and independently interpreted by the ED provider.  Interpretation time: 04:44  Rate: 81 BPM  Rhythm: Sinus rhythm with sinus arrhythmia with 1st degree AV block  Interpretation: No acute ST changes. No STEMI.           The Emergency Provider reviewed the vital signs and test results, which are outlined above.     ED Discussion     10:28 PM: Reassessed pt at this time. Discussed with pt all pertinent ED information and results. Discussed pt dx and plan of tx. Gave pt all f/u and  return to the ED instructions. All questions and concerns were addressed at this time. Pt expresses understanding of information and instructions, and is comfortable with plan to discharge. Pt is stable for discharge.    I discussed with patient and/or family/caretaker that evaluation in the ED does not suggest any emergent or life threatening medical conditions requiring immediate intervention beyond what was provided in the ED, and I believe patient is safe for discharge.  Regardless, an unremarkable evaluation in the ED does not preclude the development or presence of a serious of life threatening condition. As such, patient was instructed to return immediately for any worsening or change in current symptoms.         Medical Decision Making  Differential diagnosis:  Leg numbness, herniated disc, sciatica, arterial occlusion, vein occlusion    Patient was evaluated history physical examination as well as chart review.  Patient was had left leg numbness since his admission several days ago.  Has a moments of worsening and waning but has not completely resolved.  Had an MRI MRA that was performed secondary to this in his last admission which was negative.  Patient denies any back pain.  Has good pedal pulses and vascular imaging of the arteries and veins via ultrasound were normal flow.  Patient was no back tenderness.  CT imaging was obtained of the lower back showing no acute fracture or dislocation.  Clinically the patient likely has a bulging disc.  Has no focal lateralizing complaints other than the numbness to indicate any possible stroke and he had been cleared from this with an MRI while admitted.  Patient was stable safe for discharge has good follow-up already scheduled.    Amount and/or Complexity of Data Reviewed  External Data Reviewed: labs and notes.     Details: Patient just released for chest pain evaluation.  Done at timeframe he had left leg numbness consistent with this he was evaluated by  Neurology and negative MRI MRA of the brain.  He was cleared from neurological standpoint for discharge.  His symptoms did improve in the hospital but recurred today  Labs: ordered. Decision-making details documented in ED Course.  Radiology: ordered. Decision-making details documented in ED Course.  ECG/medicine tests: ordered and independent interpretation performed. Decision-making details documented in ED Course.    Risk  OTC drugs.  Prescription drug management.  Decision regarding hospitalization.                ED Medication(s):  Medications - No data to display    Discharge Medication List as of 2/26/2024 10:32 PM           Follow-up Information       Bhupinder Callaway MD.    Specialties: Internal Medicine, Pediatrics  Contact information:  [address]  [place] [address]  [phone]                                 Scribe Attestation:   Scribe #1: I performed the above scribed service and the documentation accurately describes the services I performed. I attest to the accuracy of the note.     Attending:   Physician Attestation Statement for Scribe #1: I, Daryl Arevalo Jr., MD, personally performed the services described in this documentation, as scribed by Mulu Alvarez, in my presence, and it is both accurate and complete.           Clinical Impression       ICD-10-CM ICD-9-CM   1. Left leg pain  M79.605 729.5   2. Left leg numbness  R20.0 782.0       Disposition:   Disposition: Discharged  Condition: Stable         Daryl Arevalo Jr., MD  02/27/24 0018

## 2024-02-28 ENCOUNTER — HOSPITAL ENCOUNTER (OUTPATIENT)
Dept: RADIOLOGY | Facility: HOSPITAL | Age: 79
Discharge: HOME | End: 2024-02-28
Attending: INTERNAL MEDICINE
Payer: MEDICARE

## 2024-02-28 DIAGNOSIS — C22.0 HCC (HEPATOCELLULAR CARCINOMA): ICD-10-CM

## 2024-02-28 DIAGNOSIS — K74.69 OTHER CIRRHOSIS OF LIVER: ICD-10-CM

## 2024-02-28 LAB
OHS QRS DURATION: 80 MS
OHS QTC CALCULATION: 436 MS

## 2024-02-28 PROCEDURE — 25500020 PHARM REV CODE 255: Performed by: INTERNAL MEDICINE

## 2024-02-28 PROCEDURE — 74183 MRI ABD W/O CNTR FLWD CNTR: CPT | Mod: 26,,, | Performed by: RADIOLOGY

## 2024-02-28 PROCEDURE — 74183 MRI ABD W/O CNTR FLWD CNTR: CPT | Mod: TC

## 2024-02-28 PROCEDURE — A9585 GADOBUTROL INJECTION: HCPCS | Performed by: INTERNAL MEDICINE

## 2024-02-28 RX ORDER — GADOBUTROL 604.72 MG/ML
9 INJECTION INTRAVENOUS
Status: COMPLETED | OUTPATIENT
Start: 2024-02-28 | End: 2024-02-28

## 2024-02-28 RX ADMIN — GADOBUTROL 9 ML: 604.72 INJECTION INTRAVENOUS at 10:02

## 2024-03-01 ENCOUNTER — OFFICE VISIT (OUTPATIENT)
Dept: CARDIOLOGY | Facility: CLINIC | Age: 79
End: 2024-03-01
Payer: MEDICARE

## 2024-03-01 VITALS
WEIGHT: 224.19 LBS | OXYGEN SATURATION: 98 % | HEIGHT: 67 IN | RESPIRATION RATE: 16 BRPM | HEART RATE: 76 BPM | SYSTOLIC BLOOD PRESSURE: 140 MMHG | BODY MASS INDEX: 35.19 KG/M2 | DIASTOLIC BLOOD PRESSURE: 70 MMHG

## 2024-03-01 DIAGNOSIS — I70.0 AORTIC ATHEROSCLEROSIS: Primary | ICD-10-CM

## 2024-03-01 DIAGNOSIS — Z79.4 TYPE 2 DIABETES MELLITUS WITH MICROALBUMINURIA, WITH LONG-TERM CURRENT USE OF INSULIN: Chronic | ICD-10-CM

## 2024-03-01 DIAGNOSIS — G72.0 STATIN MYOPATHY: ICD-10-CM

## 2024-03-01 DIAGNOSIS — R07.9 CHEST PAIN, MODERATE CORONARY ARTERY RISK: ICD-10-CM

## 2024-03-01 DIAGNOSIS — C22.0 HCC (HEPATOCELLULAR CARCINOMA): ICD-10-CM

## 2024-03-01 DIAGNOSIS — I49.3 PVC (PREMATURE VENTRICULAR CONTRACTION): ICD-10-CM

## 2024-03-01 DIAGNOSIS — E11.29 TYPE 2 DIABETES MELLITUS WITH MICROALBUMINURIA, WITH LONG-TERM CURRENT USE OF INSULIN: Chronic | ICD-10-CM

## 2024-03-01 DIAGNOSIS — T46.6X5A STATIN MYOPATHY: ICD-10-CM

## 2024-03-01 DIAGNOSIS — I25.10 CORONARY ARTERY DISEASE INVOLVING NATIVE CORONARY ARTERY OF NATIVE HEART WITHOUT ANGINA PECTORIS: Chronic | ICD-10-CM

## 2024-03-01 DIAGNOSIS — R80.9 TYPE 2 DIABETES MELLITUS WITH MICROALBUMINURIA, WITH LONG-TERM CURRENT USE OF INSULIN: Chronic | ICD-10-CM

## 2024-03-01 DIAGNOSIS — I10 ESSENTIAL HYPERTENSION: Chronic | ICD-10-CM

## 2024-03-01 DIAGNOSIS — I85.10 SECONDARY ESOPHAGEAL VARICES WITHOUT BLEEDING: ICD-10-CM

## 2024-03-01 DIAGNOSIS — K21.00 GASTROESOPHAGEAL REFLUX DISEASE WITH ESOPHAGITIS WITHOUT HEMORRHAGE: ICD-10-CM

## 2024-03-01 DIAGNOSIS — I65.23 BILATERAL CAROTID ARTERY STENOSIS: Chronic | ICD-10-CM

## 2024-03-01 DIAGNOSIS — I35.0 NONRHEUMATIC AORTIC VALVE STENOSIS: ICD-10-CM

## 2024-03-01 DIAGNOSIS — I73.9 PVD (PERIPHERAL VASCULAR DISEASE): ICD-10-CM

## 2024-03-01 DIAGNOSIS — I48.0 PAROXYSMAL ATRIAL FIBRILLATION: ICD-10-CM

## 2024-03-01 DIAGNOSIS — E78.2 MIXED HYPERLIPIDEMIA: ICD-10-CM

## 2024-03-01 DIAGNOSIS — G47.33 OSA ON CPAP: Chronic | ICD-10-CM

## 2024-03-01 DIAGNOSIS — Z95.1 S/P CABG X 2: ICD-10-CM

## 2024-03-01 PROCEDURE — 1101F PT FALLS ASSESS-DOCD LE1/YR: CPT | Mod: CPTII,S$GLB,, | Performed by: INTERNAL MEDICINE

## 2024-03-01 PROCEDURE — 3072F LOW RISK FOR RETINOPATHY: CPT | Mod: CPTII,S$GLB,, | Performed by: INTERNAL MEDICINE

## 2024-03-01 PROCEDURE — 99214 OFFICE O/P EST MOD 30 MIN: CPT | Mod: S$GLB,,, | Performed by: INTERNAL MEDICINE

## 2024-03-01 PROCEDURE — 1159F MED LIST DOCD IN RCRD: CPT | Mod: CPTII,S$GLB,, | Performed by: INTERNAL MEDICINE

## 2024-03-01 PROCEDURE — 3288F FALL RISK ASSESSMENT DOCD: CPT | Mod: CPTII,S$GLB,, | Performed by: INTERNAL MEDICINE

## 2024-03-01 PROCEDURE — 99999 PR PBB SHADOW E&M-EST. PATIENT-LVL V: CPT | Mod: PBBFAC,,, | Performed by: INTERNAL MEDICINE

## 2024-03-01 PROCEDURE — 1157F ADVNC CARE PLAN IN RCRD: CPT | Mod: CPTII,S$GLB,, | Performed by: INTERNAL MEDICINE

## 2024-03-01 PROCEDURE — 3077F SYST BP >= 140 MM HG: CPT | Mod: CPTII,S$GLB,, | Performed by: INTERNAL MEDICINE

## 2024-03-01 PROCEDURE — 1160F RVW MEDS BY RX/DR IN RCRD: CPT | Mod: CPTII,S$GLB,, | Performed by: INTERNAL MEDICINE

## 2024-03-01 PROCEDURE — 3078F DIAST BP <80 MM HG: CPT | Mod: CPTII,S$GLB,, | Performed by: INTERNAL MEDICINE

## 2024-03-01 RX ORDER — CARVEDILOL 6.25 MG/1
6.25 TABLET ORAL 2 TIMES DAILY
Qty: 60 TABLET | Refills: 11 | OUTPATIENT
Start: 2024-03-01 | End: 2024-03-25

## 2024-03-01 NOTE — PROGRESS NOTES
Subjective:   Patient ID:  Erendira Pretty is a 78 y.o. male who presents for follow-up of No chief complaint on file.  Pt with hospitalization TIA and CP. Meds adjusted. No anticoagulation with recent GI blled  Patient denies CP, angina or anginal equivalent.  Hypertension  This is a chronic problem. The current episode started more than 1 year ago. The problem has been gradually improving since onset. The problem is controlled. Pertinent negatives include no chest pain, palpitations or shortness of breath. Past treatments include ACE inhibitors, beta blockers and calcium channel blockers. The current treatment provides moderate improvement. There are no compliance problems.  Hypertensive end-organ damage includes CAD/MI.   Coronary Artery Disease  Presents for follow-up visit. Pertinent negatives include no chest pain, chest pressure, chest tightness, dizziness, leg swelling, muscle weakness, palpitations, shortness of breath or weight gain. The symptoms have been stable. Compliance with diet is variable. Compliance with exercise is variable. Compliance with medications is good.   Hyperlipidemia  This is a chronic problem. The current episode started more than 1 year ago. The problem is controlled. Pertinent negatives include no chest pain or shortness of breath. Treatments tried: repatha. The current treatment provides moderate improvement of lipids. There are no compliance problems.        Review of Systems   Constitutional: Negative. Negative for weight gain.   HENT: Negative.     Eyes: Negative.    Cardiovascular: Negative.  Negative for chest pain, leg swelling and palpitations.   Respiratory: Negative.  Negative for chest tightness and shortness of breath.    Endocrine: Negative.    Hematologic/Lymphatic: Negative.    Skin: Negative.    Musculoskeletal:  Negative for muscle weakness.   Gastrointestinal: Negative.    Genitourinary: Negative.    Neurological: Negative.  Negative for dizziness.    Psychiatric/Behavioral: Negative.     Allergic/Immunologic: Negative.    All other systems reviewed and are negative.    Family History   Problem Relation Age of Onset    Heart disease Mother     Heart disease Father     Heart disease Brother     Colon cancer Neg Hx      Past Medical History:   Diagnosis Date    Anticoagulant long-term use     Aortic stenosis     Asthma     Benign prostatic hyperplasia with nocturia     Bilateral carotid artery stenosis 2021    US CAROTID BILATERAL 2021 IMPRESSION: Atherosclerosis with suspected stenosis greater than 50% at the right proximal ICA visually. Velocities are discordant and appear improved from prior. Atherosclerosis on the left with no evidence of flow-limiting stenosis greater than 50%.  FINDINGS: Right: Internal Carotid Artery (ICA): Peak systolic velocity 118 cm/sec. End diastolic velocity 35 cm/sec    BPH (benign prostatic hyperplasia)     CAD (coronary artery disease)     40% lesion ;lazo    Cirrhosis     Claudication 2014    Colon polyp     COPD (chronic obstructive pulmonary disease)     ED (erectile dysfunction)     Encounter for blood transfusion     Ex-smoker     Hearing loss NEC     Hepatitis C     Cured;reji brown     Hyperlipidemia     Hypertension     Hypothyroid     s/p tx graves    Open angle with borderline findings and low glaucoma risk in both eyes 2020    DELONTE on CPAP     Osteoarthritis     Paroxysmal atrial fibrillation     PVD (peripheral vascular disease)     Secondary esophageal varices without bleeding 2017    Type 2 diabetes mellitus      Social History     Socioeconomic History    Marital status:      Spouse name: YAEL    Number of children: 2   Tobacco Use    Smoking status: Former     Current packs/day: 0.00     Average packs/day: 0.5 packs/day for 4.0 years (2.0 total pack years)     Types: Cigarettes     Start date: 1968     Quit date: 1972     Years since quittin.7     Smokeless tobacco: Former     Types: Chew     Quit date: 11/18/1976   Substance and Sexual Activity    Alcohol use: Yes     Alcohol/week: 2.0 standard drinks of alcohol     Types: 2 Cans of beer per week    Drug use: No    Sexual activity: Yes     Partners: Female   Social History Narrative    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    No pets or smokers in household, lives alone.     Current Outpatient Medications on File Prior to Visit   Medication Sig Dispense Refill    ACCU-CHEK GRACE PLUS METER Misc AS DIRECTED 1 each 0    albuterol (VENTOLIN HFA) 90 mcg/actuation inhaler Inhale 2 puffs into the lungs every 6 (six) hours as needed for Wheezing or Shortness of Breath. Rescue 18 g 3    amLODIPine (NORVASC) 10 MG tablet TAKE 1 TABLET BY MOUTH ONCE A DAY (DOSE INCREASE) (Patient taking differently: Take by mouth every morning.) 30 tablet 11    blood sugar diagnostic (ACCU-CHEK GRACE PLUS TEST STRP) Strp TEST THREE TIMES A  strip 11    budesonide-formoterol 160-4.5 mcg (SYMBICORT) 160-4.5 mcg/actuation HFAA INHALE 2 PUFFS INTO THE LUNGS TWO TIMES A DAY. 10.2 g 11    carvediloL (COREG) 3.125 MG tablet Take 2 tablets (6.25 mg total) by mouth 2 (two) times daily. 120 tablet 0    empagliflozin (JARDIANCE) 25 mg tablet Take 1 tablet (25 mg total) by mouth once daily. (Patient taking differently: Take 25 mg by mouth every morning.) 90 tablet 1    finasteride (PROSCAR) 5 mg tablet TAKE 1 TABLET BY MOUTH once daily 90 tablet 2    furosemide (LASIX) 40 MG tablet Take 1 tablet (40 mg total) by mouth 2 (two) times daily. 60 tablet 11    GLUCOSAMINE HCL/CHONDR ROSARIO A NA (OSTEO BI-FLEX ORAL) Take 1 tablet by mouth 2 (two) times daily.       insulin (LANTUS SOLOSTAR U-100 INSULIN) glargine 100 units/mL SubQ pen Inject 44 Units into the skin every evening. 45 mL 1    lancets (ACCU-CHEK FASTCLIX LANCET DRUM) Misc TEST TWO TIMES A  each 5    levocetirizine  "(XYZAL) 5 MG tablet Take 1 tablet (5 mg total) by mouth every evening. 30 tablet 11    levothyroxine (SYNTHROID) 300 MCG Tab TAKE 1 TABLET BY MOUTH EVERY MORNING 90 tablet 3    lisinopriL 10 MG tablet TAKE 1 BY MOUTH EVERY DAY (Patient taking differently: Take 10 mg by mouth every morning.) 30 tablet 7    metFORMIN (GLUCOPHAGE-XR) 500 MG ER 24hr tablet Take 1 tablet (500 mg total) by mouth 2 (two) times daily with meals. 180 tablet 3    montelukast (SINGULAIR) 10 mg tablet Take 1 tablet (10 mg total) by mouth once daily. 90 tablet 3    pen needle, diabetic (BD ULTRA-FINE MINI PEN NEEDLE) 31 gauge x 3/16" Ndle USE AS DIRECTED 100 each 11    propafenone (RHTHYMOL) 150 MG Tab Take 1 tablet (150 mg total) by mouth every 8 (eight) hours. 90 tablet 11    RABEprazole (ACIPHEX) 20 mg tablet Take 1 tablet (20 mg total) by mouth 2 (two) times daily. 180 tablet 3    ranolazine (RANEXA) 500 MG Tb12 Take 1 tablet (500 mg total) by mouth 2 (two) times daily. 60 tablet 1    REPATHA SURECLICK 140 mg/mL PnIj SMARTSI Milligram(s) Topical Every 2 Weeks      sildenafiL (VIAGRA) 100 MG tablet Take 1/2 to 1 tablet by mouth one hour prior to intercourse. Max 100mg per day 30 tablet 11     Current Facility-Administered Medications on File Prior to Visit   Medication Dose Route Frequency Provider Last Rate Last Admin    lactated ringers infusion   Intravenous Continuous Omaira Theodore MD   New Bag at [phone]     Review of patient's allergies indicates:   Allergen Reactions    Lipitor [atorvastatin] Other (See Comments)     Muscle aches and pains as well as fatigue.     Niacin preparations Other (See Comments)     Causes broken blood vessels and bruises at 4 times normal dose.    Statins-hmg-coa reductase inhibitors Other (See Comments)     Statins cause intolerable myalgias.    Iodinated contrast media Hives     Tachycardia    Omeprazole Hives and Itching    Prilosec [omeprazole magnesium] Hives and Itching "       Objective:     Physical Exam  Vitals and nursing note reviewed.   Constitutional:       Appearance: He is well-developed.   HENT:      Head: Normocephalic and atraumatic.   Eyes:      Conjunctiva/sclera: Conjunctivae normal.      Pupils: Pupils are equal, round, and reactive to light.   Cardiovascular:      Rate and Rhythm: Normal rate and regular rhythm.      Pulses: Intact distal pulses.      Heart sounds: Normal heart sounds.   Pulmonary:      Effort: Pulmonary effort is normal.      Breath sounds: Normal breath sounds.   Abdominal:      General: Bowel sounds are normal.      Palpations: Abdomen is soft.   Musculoskeletal:      Cervical back: Normal range of motion and neck supple.   Skin:     General: Skin is warm and dry.   Neurological:      Mental Status: He is alert and oriented to person, place, and time.         Assessment:     1. Aortic atherosclerosis    2. Bilateral carotid artery stenosis    3. Chest pain, moderate coronary artery risk    4. Coronary artery disease involving native coronary artery of native heart without angina pectoris    5. Essential hypertension    6. S/P CABG x 2    7. PVD (peripheral vascular disease)    8. PVC (premature ventricular contraction)    9. Nonrheumatic aortic valve stenosis    10. Mixed hyperlipidemia    11. HCC (hepatocellular carcinoma)    12. BMI 36.0-36.9,adult    13. Type 2 diabetes mellitus with microalbuminuria, with long-term current use of insulin    14. Gastroesophageal reflux disease with esophagitis without hemorrhage    15. Statin myopathy    16. DELONTE on CPAP        Plan:     Aortic atherosclerosis    Bilateral carotid artery stenosis    Chest pain, moderate coronary artery risk    Coronary artery disease involving native coronary artery of native heart without angina pectoris    Essential hypertension    S/P CABG x 2    PVD (peripheral vascular disease)    PVC (premature ventricular contraction)    Nonrheumatic aortic valve stenosis    Mixed  hyperlipidemia    HCC (hepatocellular carcinoma)    BMI 36.0-36.9,adult    Type 2 diabetes mellitus with microalbuminuria, with long-term current use of insulin    Gastroesophageal reflux disease with esophagitis without hemorrhage    Statin myopathy    DELONTE on CPAP    Consult GI- clearance for anticoagulation  EP appt watchman      Continue lisinopril , norvasc , coreg- HTN  continue repatha - HLP  Continue propafenone- PAF

## 2024-03-04 ENCOUNTER — TELEPHONE (OUTPATIENT)
Dept: PAIN MEDICINE | Facility: CLINIC | Age: 79
End: 2024-03-04
Payer: MEDICARE

## 2024-03-04 ENCOUNTER — OFFICE VISIT (OUTPATIENT)
Dept: INTERNAL MEDICINE | Facility: CLINIC | Age: 79
End: 2024-03-04
Payer: MEDICARE

## 2024-03-04 VITALS
HEIGHT: 67 IN | DIASTOLIC BLOOD PRESSURE: 78 MMHG | HEART RATE: 73 BPM | RESPIRATION RATE: 16 BRPM | TEMPERATURE: 98 F | OXYGEN SATURATION: 99 % | WEIGHT: 222.88 LBS | BODY MASS INDEX: 34.98 KG/M2 | SYSTOLIC BLOOD PRESSURE: 132 MMHG

## 2024-03-04 DIAGNOSIS — T45.515S: ICD-10-CM

## 2024-03-04 DIAGNOSIS — E11.29 TYPE 2 DIABETES MELLITUS WITH MICROALBUMINURIA, WITH LONG-TERM CURRENT USE OF INSULIN: Chronic | ICD-10-CM

## 2024-03-04 DIAGNOSIS — D50.0 IRON DEFICIENCY ANEMIA DUE TO CHRONIC BLOOD LOSS: ICD-10-CM

## 2024-03-04 DIAGNOSIS — I85.10 SECONDARY ESOPHAGEAL VARICES WITHOUT BLEEDING: Chronic | ICD-10-CM

## 2024-03-04 DIAGNOSIS — R20.0 LEFT LEG NUMBNESS: ICD-10-CM

## 2024-03-04 DIAGNOSIS — Z79.4 TYPE 2 DIABETES MELLITUS WITH MICROALBUMINURIA, WITH LONG-TERM CURRENT USE OF INSULIN: Chronic | ICD-10-CM

## 2024-03-04 DIAGNOSIS — I73.9 PVD (PERIPHERAL VASCULAR DISEASE): ICD-10-CM

## 2024-03-04 DIAGNOSIS — I10 ESSENTIAL HYPERTENSION: Primary | Chronic | ICD-10-CM

## 2024-03-04 DIAGNOSIS — I48.0 PAROXYSMAL ATRIAL FIBRILLATION: ICD-10-CM

## 2024-03-04 DIAGNOSIS — I25.10 CORONARY ARTERY DISEASE INVOLVING NATIVE CORONARY ARTERY OF NATIVE HEART WITHOUT ANGINA PECTORIS: Chronic | ICD-10-CM

## 2024-03-04 DIAGNOSIS — I65.23 BILATERAL CAROTID ARTERY STENOSIS: Chronic | ICD-10-CM

## 2024-03-04 DIAGNOSIS — R80.9 TYPE 2 DIABETES MELLITUS WITH MICROALBUMINURIA, WITH LONG-TERM CURRENT USE OF INSULIN: Chronic | ICD-10-CM

## 2024-03-04 DIAGNOSIS — K55.1 MESENTERIC ARTERY STENOSIS: ICD-10-CM

## 2024-03-04 PROCEDURE — 99999 PR PBB SHADOW E&M-EST. PATIENT-LVL V: CPT | Mod: PBBFAC,,, | Performed by: PEDIATRICS

## 2024-03-04 PROCEDURE — 3075F SYST BP GE 130 - 139MM HG: CPT | Mod: CPTII,S$GLB,, | Performed by: PEDIATRICS

## 2024-03-04 PROCEDURE — 1159F MED LIST DOCD IN RCRD: CPT | Mod: CPTII,S$GLB,, | Performed by: PEDIATRICS

## 2024-03-04 PROCEDURE — 1157F ADVNC CARE PLAN IN RCRD: CPT | Mod: CPTII,S$GLB,, | Performed by: PEDIATRICS

## 2024-03-04 PROCEDURE — 3288F FALL RISK ASSESSMENT DOCD: CPT | Mod: CPTII,S$GLB,, | Performed by: PEDIATRICS

## 2024-03-04 PROCEDURE — 1126F AMNT PAIN NOTED NONE PRSNT: CPT | Mod: CPTII,S$GLB,, | Performed by: PEDIATRICS

## 2024-03-04 PROCEDURE — 1160F RVW MEDS BY RX/DR IN RCRD: CPT | Mod: CPTII,S$GLB,, | Performed by: PEDIATRICS

## 2024-03-04 PROCEDURE — 3078F DIAST BP <80 MM HG: CPT | Mod: CPTII,S$GLB,, | Performed by: PEDIATRICS

## 2024-03-04 PROCEDURE — 99214 OFFICE O/P EST MOD 30 MIN: CPT | Mod: S$GLB,,, | Performed by: PEDIATRICS

## 2024-03-04 PROCEDURE — 3072F LOW RISK FOR RETINOPATHY: CPT | Mod: CPTII,S$GLB,, | Performed by: PEDIATRICS

## 2024-03-04 PROCEDURE — 1101F PT FALLS ASSESS-DOCD LE1/YR: CPT | Mod: CPTII,S$GLB,, | Performed by: PEDIATRICS

## 2024-03-04 NOTE — PROGRESS NOTES
"Subjective     Patient ID: Erendira Pretty is a 78 y.o. male.    Chief Complaint: Hospital Follow Up    Erendira Pretty is a 78 y.o. male who presents for a hospital follow up. Pt was admitted to [facility] on 2/23 and later discharged after workup. Pt returned on 2/26 ED visit for returning sxs. Discharge note from Dr. Arevalo on 2/26 states the following.   "Patient was evaluated history physical examination as well as chart review.  Patient was had left leg numbness since his admission several days ago.  Has a moments of worsening and waning but has not completely resolved.  Had an MRI MRA that was performed secondary to this in his last admission which was negative.  Patient denies any back pain.  Has good pedal pulses and vascular imaging of the arteries and veins via ultrasound were normal flow.  Patient was no back tenderness.  CT imaging was obtained of the lower back showing no acute fracture or dislocation.  Clinically the patient likely has a bulging disc.  Has no focal lateralizing complaints other than the numbness to indicate any possible stroke and he had been cleared from this with an MRI while admitted.  Patient was stable safe for discharge has good follow-up already scheduled."    PMHx, PSHx, SocHx, and FHx reviewed and discussed with patient.    Pt reports the left leg numbness is worst the longer he walks. Overall he is feeling stronger and has no other complaints. He has subspecialty follow up in place. One of the needs is to discuss risk of anticoagulation and sees GI tomorrow. Recent cardiology note reviewed with patient. B/P and BS normal. No melena.      Review of Systems   Constitutional:  Negative for chills and fever.   HENT:  Negative for nasal congestion and rhinorrhea.    Respiratory:  Negative for cough and shortness of breath.    Cardiovascular:  Negative for chest pain.   Gastrointestinal:  Negative for abdominal pain, blood in stool, change in bowel habit, constipation, diarrhea and " nausea.   Endocrine: Negative for cold intolerance, heat intolerance, polydipsia, polyphagia and polyuria.   Genitourinary:  Negative for dysuria.   Neurological:  Positive for numbness. Negative for weakness.          Objective     Physical Exam  Constitutional:       General: He is not in acute distress.     Appearance: Normal appearance. He is normal weight. He is not ill-appearing, toxic-appearing or diaphoretic.   HENT:      Head: Atraumatic.   Cardiovascular:      Rate and Rhythm: Normal rate and regular rhythm.      Pulses: Normal pulses.      Heart sounds: Normal heart sounds. No murmur heard.     Comments: Grade 2 harsh systolic injection murmur loudest at left sternal border.  Pulmonary:      Effort: Pulmonary effort is normal. No respiratory distress.      Breath sounds: Normal breath sounds. No stridor. No wheezing, rhonchi or rales.   Chest:      Chest wall: No tenderness.   Abdominal:      General: Abdomen is flat. Bowel sounds are normal. There is no distension.      Palpations: Abdomen is soft. There is no mass.      Tenderness: There is no abdominal tenderness. There is no guarding.   Neurological:      General: No focal deficit present.      Mental Status: He is alert and oriented to person, place, and time. Mental status is at baseline.      Cranial Nerves: No cranial nerve deficit.      Sensory: No sensory deficit.      Motor: No weakness.      Gait: Gait normal.   Psychiatric:         Mood and Affect: Mood normal.         Behavior: Behavior normal.            Assessment and Plan     1. Essential hypertension    2. Coronary artery disease involving native coronary artery of native heart without angina pectoris  Overview:  40% lesion 2002;lazo      3. PVD (peripheral vascular disease)    4. Secondary esophageal varices without bleeding    5. Iron deficiency anemia due to chronic blood loss    6. Type 2 diabetes mellitus with microalbuminuria, with long-term current use of insulin    7.  Bilateral carotid artery stenosis  Overview:  US CAROTID BILATERAL 07/14/2021  IMPRESSION: Atherosclerosis with suspected stenosis greater than 50% at the right proximal ICA visually. Velocities are discordant and appear improved from prior. Atherosclerosis on the left with no evidence of flow-limiting stenosis greater than 50%.   FINDINGS: Right: Internal Carotid Artery (ICA): Peak systolic velocity 118 cm/sec. End diastolic velocity 35 cm/sec. ICA/CCA peak systolic ratio: 1.4. ICA/CCA end diastolic ratio: 1.7. Plaque formation: Homogeneous plaque again noted with greater than 50% luminal narrowing visually. Vertebral artery: Antegrade flow and normal waveform. Left: Internal Carotid Artery (ICA): Peak systolic velocity 85 cm/sec. End diastolic velocity 17 cm/sec. ICA/CCA peak systolic ratio: 1.0. ICA/CCA end diastolic ratio: 0.7. Plaque formation: Homogeneous. Vertebral artery: Antegrade flow and normal waveform.      8. Paroxysmal atrial fibrillation    9. Mesenteric artery stenosis    10. Anticoagulant causing adverse effect in therapeutic use, sequela    11. Left leg numbness  -     Ambulatory referral/consult to [facility]; Future; Expected date: 03/11/2024        Will refer to back and spine for workup. This episode does not seem like CNS based on hospital findings. Maintain baseline medicines. Keep subspecialty care. Follow up as scheduled.          No follow-ups on file.      Transitional Care Note    Family and/or Caretaker present at visit?  No.  Diagnostic tests reviewed/disposition: I have reviewed all completed as well as pending diagnostic tests at the time of discharge.  Disease/illness education: given  Home health/community services discussion/referrals: Patient does not have home health established from hospital visit.  They do not need home health.  If needed, we will set up home health for the patient.   Establishment or re-establishment of referral orders for community resources: No  other necessary community resources.   Discussion with other health care providers:  referrals in place .

## 2024-03-04 NOTE — PROGRESS NOTES
Subjective:       Patient ID: Erendira Pretty is a 74 y.o. male.  Patient Active Problem List   Diagnosis    Type 2 diabetes mellitus with post op hyperglycemia    Essential hypertension    BMI 36.0-36.9,adult    PVC (premature ventricular contraction)    Hypothyroid    CAD (coronary artery disease)    Dryness, eye - Both Eyes    Epiretinal membrane - Both Eyes    PVD (peripheral vascular disease)    Claudication    ED (erectile dysfunction)    Anorectal fistula    Hepatic cirrhosis    BPH (benign prostatic hyperplasia)    DELONTE on CPAP    Asthma    Pseudophakia    Colon polyps    Anal fissure    Esophageal varices    Asbestos exposure    Chest pain, moderate coronary artery risk    Aortic stenosis mild    Urethral polyp    Chronic wheezy bronchitis    Angina pectoris    Left main coronary artery disease    Mixed hyperlipidemia    S/P CABG x 2    Pain in both lower extremities    Chronic pansinusitis    Nasal polyposis    Non-seasonal allergic rhinitis due to pollen    Iron deficiency anemia due to chronic blood loss    Preop cardiovascular exam    GERD (gastroesophageal reflux disease)    HCC (hepatocellular carcinoma)     Immunization History   Administered Date(s) Administered    Hepatitis A 07/24/2003, 01/12/2004    Hepatitis A, Pediatric/Adolescent, 2 Dose 07/24/2003, 01/12/2004    Hepatitis B 07/24/2003, 08/21/2003, 01/12/2004, 04/23/2014, 05/23/2014    Hepatitis B, Adult 04/23/2014, 05/23/2014, 10/20/2014    Hepatitis B, Pediatric/Adolescent 07/24/2003, 08/21/2003, 01/12/2004    Influenza 11/23/2004, 10/17/2007, 10/20/2008, 10/07/2009, 10/20/2010    Influenza - High Dose - PF (65 years and older) 10/03/2011, 10/29/2013, 10/21/2014, 09/11/2015, 11/10/2016, 10/18/2017, 09/27/2018    Influenza - Trivalent (ADULT) 11/23/2004, 10/17/2007, 10/20/2008, 10/07/2009, 10/20/2010, 09/26/2012    Influenza - Trivalent - Adjuvanted - PF 09/27/2018, 09/24/2019    Influenza A (H1N1)   Monovalent - IM - PF 2009    Influenza Split 2012    Pneumococcal Conjugate - 13 Valent 2015    Pneumococcal Polysaccharide - 23 Valent 2014    Tdap 2014    Zoster 2012    Zoster Recombinant 2018, 2019, 2019     Social History     Tobacco Use   Smoking Status Former Smoker    Packs/day: 0.50    Years: 4.00    Pack years: 2.00    Last attempt to quit: 1972    Years since quittin.3   Smokeless Tobacco Former User    Types: Chew    Quit date: 1976     EPWORTH SLEEPINESS SCALE 2019   Sitting and reading 0   Watching TV 3   Sitting, inactive in a public place (e.g. a theatre or a meeting) 0   As a passenger in a car for an hour without a break 0   Lying down to rest in the afternoon when circumstances permit 3   Sitting and talking to someone 0   Sitting quietly after a lunch without alcohol 0   In a car, while stopped for a few minutes in traffic 0   Total score 6       Answers for HPI/ROS submitted by the patient on 2019   Asthma  In the past 4 weeks, how much of the time did your asthma keep you from getting as much done at work, school, or at home?: none of the time  During the past 4 weeks, how often have you had shortness of breath?: not at all  During the past 4 weeks, how often did your asthma symptoms (Wheezing, coughing, shortness of breath, chest tightness or pain) wake you up at night or earlier that usual in the morning?: not at all  During the past 4 weeks, how often have you used your rescue inhaler or nebulizer medication (such as albuterol)?: not at all  How would you rate your asthma control during the past 4 weeks?: well controlled   : 24      Chief Complaint: Sleep Apnea and Asthma    Mr Maria De Jesus Krishna is 74 years old  DELONTE on CPAP, Chr wheezy asthmatic bronchitis, Chr nasal congestion  LV 3/21/2019: recent radio abalation liver lesion  Has been SOB, cough, congestion  Using nebuliser  Recent seen Dr Parkinson:  "vertigo  Currently using CPAP.  No daytime sleepiness  Ep worth score 6.  ACT score 24.  Respiratory symptoms    Immunizations are current  Not on Oxygen  Reviewed CXR today: NORMAL, Spirometry FEV1 and FVC declined  Will bring data card for CPAP  elligible for new machine July 2020  .    Asthma   He complains of cough and shortness of breath. There is no wheezing. Pertinent negatives include no headaches. His past medical history is significant for asthma.     Review of Systems   Constitutional: Negative for fatigue.   HENT: Negative for voice change and congestion.    Eyes: Negative.    Respiratory: Positive for cough, shortness of breath and asthma nighttime symptoms. Negative for wheezing, dyspnea on extertion and somnolence.    Cardiovascular: Negative for leg swelling.   Genitourinary: Negative.    Endocrine: endocrine negative   Musculoskeletal: Negative.    Skin: Negative.    Gastrointestinal: Negative.    Neurological: Negative for headaches.   Psychiatric/Behavioral: Negative.    All other systems reviewed and are negative.      Objective:       Vitals:    09/27/19 1028   BP: (!) 140/84   Pulse: 94   Resp: 20   SpO2: 96%   Weight: 110.3 kg (243 lb 2.7 oz)   Height: 5' 8.11" (1.73 m)     Physical Exam   Constitutional: He is oriented to person, place, and time. Vital signs are normal. He appears well-developed and well-nourished.     He is obese.   HENT:   Head: Normocephalic.   Nose: No mucosal edema or rhinorrhea.   Mouth/Throat: Oropharynx is clear and moist. Mallampati Score: III.   Neck: Normal range of motion. Neck supple.   Cardiovascular: Normal rate, regular rhythm and intact distal pulses.   No murmur heard.  Pulmonary/Chest: Normal expansion, symmetric chest wall expansion, effort normal and breath sounds normal. He has no decreased breath sounds. He has no rhonchi. He has no rales.   Abdominal: Soft. Bowel sounds are normal.   Musculoskeletal: Normal range of motion. He exhibits no edema. "   Lymphadenopathy:     He has no cervical adenopathy.   Neurological: He is alert and oriented to person, place, and time. He has normal reflexes.   Skin: Skin is warm and dry.   Psychiatric: He has a normal mood and affect.   Nursing note and vitals reviewed.    Personal Diagnostic Review        Personal Diagnostic Review  [x]  CXR  COMPARISON:  06/04/2019    FINDINGS:  Cardiac silhouette and mediastinal contours are stable.  Lungs are clear.  Osseous structures are intact.    [x]  SPIROMETRY  FEV1: 1.93L ( 66.3%), FVC 2.92L( 75.6%)  FEV1/FVC66.20   No flowsheet data found.      Assessment:       Problem List Items Addressed This Visit     Asthma    Relevant Medications    predniSONE (DELTASONE) 20 MG tablet    Mixed hyperlipidemia    DELONTE on CPAP (Chronic)     Currently on CPAP 11 cm water pressure  Subjectively patient verbalizes use and benefit from machine.  Bring CPAP machine for validation of adherence  May qualify for new machine July 2020         Relevant Orders    X-Ray Chest PA And Lateral    Spirometry without Bronchodilator    Essential hypertension     Stable:  Metoprolol         BMI 36.0-36.9,adult     General weight loss/lifestyle modification strategies discussed (elicit support from others; identify saboteurs; non-food rewards).  Diet interventions: low calorie (1000 kCal/d) deficit diet           Chronic wheezy bronchitis - Primary     ACT score 24  immunizations are current  Recent shortness of breath wheezing after radiofrequency ablation hepatic lesion  FEV1 FVC declined  Med: SYMBICORT and RESCUE albuterol  FEV1:1.93L ( 66.3%), FVC 2.92L( 75.0%)  FEV1/FVC 66.2    Steroid taper sent to pharmacy           Relevant Medications    predniSONE (DELTASONE) 20 MG tablet           Plan:          Requested Prescriptions     Signed Prescriptions Disp Refills    predniSONE (DELTASONE) 20 MG tablet 25 tablet 0     Sig: Take 1 tablet (20 mg total) by mouth As instructed. 3tab po daily x3days,2tabs po daily  x3days,1tab po daily x3days,1/2tab po daily x3days     Orders Placed This Encounter   Procedures    X-Ray Chest PA And Lateral     Standing Status:   Future     Standing Expiration Date:   9/26/2020    Spirometry without Bronchodilator     Standing Status:   Future     Standing Expiration Date:   9/26/2020         Follow up in about 6 months (around 3/27/2020).    This note was prepared using voice recognition system and is likely to have sound alike errors that may have been overlooked even after proof reading.  Please call me with any questions    Discussed diagnosis, its evaluation, treatment and usual course. All questions answered.    Thank you for the courtesy of participating in the care of this patient    Rishi Molina MD         57

## 2024-03-05 ENCOUNTER — OFFICE VISIT (OUTPATIENT)
Dept: GASTROENTEROLOGY | Facility: CLINIC | Age: 79
End: 2024-03-05
Payer: MEDICARE

## 2024-03-05 VITALS
BODY MASS INDEX: 34.73 KG/M2 | DIASTOLIC BLOOD PRESSURE: 76 MMHG | HEIGHT: 67 IN | WEIGHT: 221.31 LBS | SYSTOLIC BLOOD PRESSURE: 130 MMHG | HEART RATE: 74 BPM

## 2024-03-05 DIAGNOSIS — K92.1 MELENA: Primary | ICD-10-CM

## 2024-03-05 PROCEDURE — 1159F MED LIST DOCD IN RCRD: CPT | Mod: CPTII,S$GLB,, | Performed by: INTERNAL MEDICINE

## 2024-03-05 PROCEDURE — 1101F PT FALLS ASSESS-DOCD LE1/YR: CPT | Mod: CPTII,S$GLB,, | Performed by: INTERNAL MEDICINE

## 2024-03-05 PROCEDURE — 3072F LOW RISK FOR RETINOPATHY: CPT | Mod: CPTII,S$GLB,, | Performed by: INTERNAL MEDICINE

## 2024-03-05 PROCEDURE — 1157F ADVNC CARE PLAN IN RCRD: CPT | Mod: CPTII,S$GLB,, | Performed by: INTERNAL MEDICINE

## 2024-03-05 PROCEDURE — 1126F AMNT PAIN NOTED NONE PRSNT: CPT | Mod: CPTII,S$GLB,, | Performed by: INTERNAL MEDICINE

## 2024-03-05 PROCEDURE — 3075F SYST BP GE 130 - 139MM HG: CPT | Mod: CPTII,S$GLB,, | Performed by: INTERNAL MEDICINE

## 2024-03-05 PROCEDURE — 99999 PR PBB SHADOW E&M-EST. PATIENT-LVL V: CPT | Mod: PBBFAC,,, | Performed by: INTERNAL MEDICINE

## 2024-03-05 PROCEDURE — 1160F RVW MEDS BY RX/DR IN RCRD: CPT | Mod: CPTII,S$GLB,, | Performed by: INTERNAL MEDICINE

## 2024-03-05 PROCEDURE — 99213 OFFICE O/P EST LOW 20 MIN: CPT | Mod: S$GLB,,, | Performed by: INTERNAL MEDICINE

## 2024-03-05 PROCEDURE — 3288F FALL RISK ASSESSMENT DOCD: CPT | Mod: CPTII,S$GLB,, | Performed by: INTERNAL MEDICINE

## 2024-03-05 PROCEDURE — 3078F DIAST BP <80 MM HG: CPT | Mod: CPTII,S$GLB,, | Performed by: INTERNAL MEDICINE

## 2024-03-05 NOTE — PROGRESS NOTES
Clinic Progress Note:  [facility] Progress Note    Reason for Visit:  The encounter diagnosis was Melena.    PCP: Bhupinder Callaway   No address on file    HPI:    Mr Pretty is a 78 year old that presented to ED last month with black stools for a few days. His history is significant for SMA stenting 01/05/2024, liver cirrhosis and HCC (tx'd w/ ablation) with small esophageal varices and gastritis. He was admitted 01/16/24 for the same after starting ASA and Plavix following his stent placement. An EGD (01/17/24) showed grade I esophageal varices without bleeding and gastritis. He took Xarelto two nights prior to admission. ER work up revealed a Hgb of 8.4 which is unchanged overnight. , INR 1.1, BUN 20, creatinine 1.1.     EGD (01/17/24): Grade I esophageal varices without bleeding and gastritis.   Colonoscopy (12/2023): colon polyps and ischemic ulcers which led to the recent SMA stenting.     VCE (2/2024) was normal.     After that hospitalization, he was taken off plavix, aspirin, and xarelto.   He is here to talk about anticoagulation.         ROS:  As above HPI       Allergies: Reviewed    Home Medications:   Medication List with Changes/Refills   Current Medications    ACCU-CHEK GRACE PLUS METER MISC    AS DIRECTED    ALBUTEROL (VENTOLIN HFA) 90 MCG/ACTUATION INHALER    Inhale 2 puffs into the lungs every 6 (six) hours as needed for Wheezing or Shortness of Breath. Rescue    AMLODIPINE (NORVASC) 10 MG TABLET    TAKE 1 TABLET BY MOUTH ONCE A DAY (DOSE INCREASE)    BLOOD SUGAR DIAGNOSTIC (ACCU-CHEK GRACE PLUS TEST STRP) STRP    TEST THREE TIMES A DAY    BUDESONIDE-FORMOTEROL 160-4.5 MCG (SYMBICORT) 160-4.5 MCG/ACTUATION HFAA    INHALE 2 PUFFS INTO THE LUNGS TWO TIMES A DAY.    CARVEDILOL (COREG) 6.25 MG TABLET    Take 1 tablet (6.25 mg total) by mouth 2 (two) times daily.    EMPAGLIFLOZIN (JARDIANCE) 25 MG TABLET    Take 1 tablet (25 mg total) by mouth once daily.    FINASTERIDE (PROSCAR)  "5 MG TABLET    TAKE 1 TABLET BY MOUTH once daily    FUROSEMIDE (LASIX) 40 MG TABLET    Take 1 tablet (40 mg total) by mouth 2 (two) times daily.    GLUCOSAMINE HCL/CHONDR ROSARIO A NA (OSTEO BI-FLEX ORAL)    Take 1 tablet by mouth 2 (two) times daily.     INSULIN (LANTUS SOLOSTAR U-100 INSULIN) GLARGINE 100 UNITS/ML SUBQ PEN    Inject 44 Units into the skin every evening.    LANCETS (ACCU-CHEK FASTCLIX LANCET DRUM) MISC    TEST TWO TIMES A DAY    LEVOCETIRIZINE (XYZAL) 5 MG TABLET    Take 1 tablet (5 mg total) by mouth every evening.    LEVOTHYROXINE (SYNTHROID) 300 MCG TAB    TAKE 1 TABLET BY MOUTH EVERY MORNING    LISINOPRIL 10 MG TABLET    TAKE 1 BY MOUTH EVERY DAY    METFORMIN (GLUCOPHAGE-XR) 500 MG ER 24HR TABLET    Take 1 tablet (500 mg total) by mouth 2 (two) times daily with meals.    MONTELUKAST (SINGULAIR) 10 MG TABLET    Take 1 tablet (10 mg total) by mouth once daily.    PEN NEEDLE, DIABETIC (BD ULTRA-FINE MINI PEN NEEDLE) 31 GAUGE X 3/16" NDLE    USE AS DIRECTED    PROPAFENONE (RHTHYMOL) 150 MG TAB    Take 1 tablet (150 mg total) by mouth every 8 (eight) hours.    RABEPRAZOLE (ACIPHEX) 20 MG TABLET    Take 1 tablet (20 mg total) by mouth 2 (two) times daily.    RANOLAZINE (RANEXA) 500 MG TB12    Take 1 tablet (500 mg total) by mouth 2 (two) times daily.    REPATHA SURECLICK 140 MG/ML PNIJ    SMARTSI Milligram(s) Topical Every 2 Weeks    SILDENAFIL (VIAGRA) 100 MG TABLET    Take 1/2 to 1 tablet by mouth one hour prior to intercourse. Max 100mg per day         Physical Exam:  Vital Signs:  /76   Pulse 74   Ht 5' 7" (1.702 m)   Wt 100.4 kg (221 lb 5.5 oz)   BMI 34.67 kg/m²   Body mass index is 34.67 kg/m².    Physical Exam  Constitutional:       Appearance: Normal appearance.   HENT:      Head: Normocephalic.      Mouth/Throat:      Mouth: Mucous membranes are moist.   Eyes:      Conjunctiva/sclera: Conjunctivae normal.   Pulmonary:      Effort: Pulmonary effort is normal.   Abdominal:      " General: There is no distension.      Palpations: Abdomen is soft.      Tenderness: There is no abdominal tenderness. There is no guarding.   Neurological:      Mental Status: He is alert.          Labs: Pertinent labs reviewed.    Assessment:  Problem List Items Addressed This Visit          GI    Melena - Primary    Current Assessment & Plan     Plan:  -Melena has resolved.   -Source of active bleeding was not observed with recent EGD and capsule endoscopy.   -Continue PPI for gastritis   -Known history of varices. Continue to follow with hepatology.   -There is a risk that patient can rebleed given that we are not sure of the source of the bleed.   -Risks and benefits for anticoagulation will need to be discussed with cardiology.   -If patient rebleeds, please make us aware so we can evaluate patient.           Melena                Follow up if symptoms worsen or fail to improve.    Order summary:  No orders of the defined types were placed in this encounter.      Thank you so much for allowing me to participate in the care of Erendira Foley MD  Gastroenterology and Hepatology  [facility]

## 2024-03-05 NOTE — ASSESSMENT & PLAN NOTE
Plan:  -Melena has resolved.   -Source of active bleeding was not observed with recent EGD and capsule endoscopy.   -Continue PPI for gastritis   -Known history of varices. Continue to follow with hepatology.   -There is a risk that patient can rebleed given that we are not sure of the source of the bleed.   -Risks and benefits for anticoagulation will need to be discussed with cardiology.   -If patient rebleeds, please make us aware so we can evaluate patient.

## 2024-03-07 ENCOUNTER — LAB VISIT (OUTPATIENT)
Dept: LAB | Facility: HOSPITAL | Age: 79
End: 2024-03-07
Attending: NURSE PRACTITIONER
Payer: MEDICARE

## 2024-03-07 ENCOUNTER — OFFICE VISIT (OUTPATIENT)
Dept: HEPATOLOGY | Facility: CLINIC | Age: 79
End: 2024-03-07
Payer: MEDICARE

## 2024-03-07 ENCOUNTER — TELEPHONE (OUTPATIENT)
Dept: HEMATOLOGY/ONCOLOGY | Facility: CLINIC | Age: 79
End: 2024-03-07
Payer: MEDICARE

## 2024-03-07 VITALS — HEIGHT: 67 IN | BODY MASS INDEX: 34.69 KG/M2 | WEIGHT: 221 LBS

## 2024-03-07 DIAGNOSIS — D50.0 IRON DEFICIENCY ANEMIA DUE TO CHRONIC BLOOD LOSS: ICD-10-CM

## 2024-03-07 DIAGNOSIS — K74.69 OTHER CIRRHOSIS OF LIVER: ICD-10-CM

## 2024-03-07 DIAGNOSIS — D50.0 BLOOD LOSS ANEMIA: Primary | ICD-10-CM

## 2024-03-07 DIAGNOSIS — C22.0 HCC (HEPATOCELLULAR CARCINOMA): ICD-10-CM

## 2024-03-07 LAB
BASOPHILS # BLD AUTO: 0.03 K/UL (ref 0–0.2)
BASOPHILS NFR BLD: 0.6 % (ref 0–1.9)
DIFFERENTIAL METHOD BLD: ABNORMAL
EOSINOPHIL # BLD AUTO: 0.1 K/UL (ref 0–0.5)
EOSINOPHIL NFR BLD: 2.4 % (ref 0–8)
ERYTHROCYTE [DISTWIDTH] IN BLOOD BY AUTOMATED COUNT: 18.7 % (ref 11.5–14.5)
FERRITIN SERPL-MCNC: 13 NG/ML (ref 20–300)
HCT VFR BLD AUTO: 35.4 % (ref 40–54)
HGB BLD-MCNC: 10.1 G/DL (ref 14–18)
IMM GRANULOCYTES # BLD AUTO: 0.01 K/UL (ref 0–0.04)
IMM GRANULOCYTES NFR BLD AUTO: 0.2 % (ref 0–0.5)
IRON SERPL-MCNC: 38 UG/DL (ref 45–160)
LYMPHOCYTES # BLD AUTO: 1.1 K/UL (ref 1–4.8)
LYMPHOCYTES NFR BLD: 21.5 % (ref 18–48)
MCH RBC QN AUTO: 23.8 PG (ref 27–31)
MCHC RBC AUTO-ENTMCNC: 28.5 G/DL (ref 32–36)
MCV RBC AUTO: 83 FL (ref 82–98)
MONOCYTES # BLD AUTO: 0.6 K/UL (ref 0.3–1)
MONOCYTES NFR BLD: 11.6 % (ref 4–15)
NEUTROPHILS # BLD AUTO: 3.1 K/UL (ref 1.8–7.7)
NEUTROPHILS NFR BLD: 63.7 % (ref 38–73)
NRBC BLD-RTO: 0 /100 WBC
PLATELET # BLD AUTO: 239 K/UL (ref 150–450)
PMV BLD AUTO: 10.7 FL (ref 9.2–12.9)
RBC # BLD AUTO: 4.25 M/UL (ref 4.6–6.2)
SATURATED IRON: 8 % (ref 20–50)
TOTAL IRON BINDING CAPACITY: 490 UG/DL (ref 250–450)
TRANSFERRIN SERPL-MCNC: 331 MG/DL (ref 200–375)
WBC # BLD AUTO: 4.92 K/UL (ref 3.9–12.7)

## 2024-03-07 PROCEDURE — 82728 ASSAY OF FERRITIN: CPT | Performed by: NURSE PRACTITIONER

## 2024-03-07 PROCEDURE — 1157F ADVNC CARE PLAN IN RCRD: CPT | Mod: CPTII,95,, | Performed by: INTERNAL MEDICINE

## 2024-03-07 PROCEDURE — 1101F PT FALLS ASSESS-DOCD LE1/YR: CPT | Mod: CPTII,95,, | Performed by: INTERNAL MEDICINE

## 2024-03-07 PROCEDURE — 99214 OFFICE O/P EST MOD 30 MIN: CPT | Mod: 95,,, | Performed by: INTERNAL MEDICINE

## 2024-03-07 PROCEDURE — 1159F MED LIST DOCD IN RCRD: CPT | Mod: CPTII,95,, | Performed by: INTERNAL MEDICINE

## 2024-03-07 PROCEDURE — 3072F LOW RISK FOR RETINOPATHY: CPT | Mod: CPTII,95,, | Performed by: INTERNAL MEDICINE

## 2024-03-07 PROCEDURE — 1160F RVW MEDS BY RX/DR IN RCRD: CPT | Mod: CPTII,95,, | Performed by: INTERNAL MEDICINE

## 2024-03-07 PROCEDURE — 36415 COLL VENOUS BLD VENIPUNCTURE: CPT | Mod: PO | Performed by: NURSE PRACTITIONER

## 2024-03-07 PROCEDURE — 83540 ASSAY OF IRON: CPT | Performed by: NURSE PRACTITIONER

## 2024-03-07 PROCEDURE — 3288F FALL RISK ASSESSMENT DOCD: CPT | Mod: CPTII,95,, | Performed by: INTERNAL MEDICINE

## 2024-03-07 PROCEDURE — 85025 COMPLETE CBC W/AUTO DIFF WBC: CPT | Performed by: NURSE PRACTITIONER

## 2024-03-07 NOTE — Clinical Note
Hi Dr. Alfred, is there significant concern for his stent you placed occluding, do you think its worth rechallenging him with plavix? Dr. Gallardo, I assume its ok to keep off the xarelto for now, but if we do not rechallenge with plavix then I would consider restarting the Xarelto because he had tolerated that for a long time.

## 2024-03-07 NOTE — PROGRESS NOTES
Subjective:     Erendira Pretty is here for follow up of cirrhosis    HPI  Since Erendira Pretty's last visit there have been significant issues.  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  He did have a stent placed with improvement of symptoms and at that time was on aspirin, Plavix as well as Xarelto.  Since then he has had multiple episodes of gastrointestinal bleeding without a clear source being found despite having an upper endoscopy as well as VCE performed.    He denies any ongoing bleeding but he is currently only on aspirin.  He had previously been on aspirin and Xarelto for his atrial fibrillation for years without any issues.  He is anxious about all the changes in his uncertain about what anticoagulation and antiplatelet therapy he needs to be back on.    Otherwise as relates to his liver there been no major issues.    HCC s/p ablation 9/2019, 1/2022     Review of Systems    Objective:     Physical Exam    MELD 3.0: 10 at 2/28/2024  9:00 AM  MELD-Na: 10 at 2/28/2024  9:00 AM  Calculated from:  Serum Creatinine: 1.3 mg/dL at 2/28/2024  9:00 AM  Serum Sodium: 141 mmol/L (Using max of 137 mmol/L) at 2/28/2024  9:00 AM  Total Bilirubin: 0.4 mg/dL (Using min of 1 mg/dL) at 2/28/2024  9:00 AM  Serum Albumin: 3.8 g/dL (Using max of 3.5 g/dL) at 2/28/2024  9:00 AM  INR(ratio): 1.1 at 2/28/2024  9:00 AM  Age at listing (hypothetical): 78 years  Sex: Male at 2/28/2024  9:00 AM      WBC   Date Value Ref Range Status   02/28/2024 4.82 3.90 - 12.70 K/uL Final     Hemoglobin   Date Value Ref Range Status   02/28/2024 9.8 (L) 14.0 - 18.0 g/dL Final     POC Hematocrit   Date Value Ref Range Status   11/05/2018 26 (L) 36 - 54 %PCV Final     Hematocrit   Date Value Ref Range Status   02/28/2024 32.2 (L) 40.0 - 54.0 % Final     Platelets    Date Value Ref Range Status   02/28/2024 201 150 - 450 K/uL Final     BUN   Date Value Ref Range Status   02/28/2024 17 8 - 23 mg/dL Final     Creatinine   Date Value Ref Range Status   02/28/2024 1.3 0.5 - 1.4 mg/dL Final   02/28/2024 1.3 0.5 - 1.4 mg/dL Final     Glucose   Date Value Ref Range Status   02/28/2024 121 (H) 70 - 110 mg/dL Final     Calcium   Date Value Ref Range Status   02/28/2024 9.2 8.7 - 10.5 mg/dL Final     Sodium   Date Value Ref Range Status   02/28/2024 141 136 - 145 mmol/L Final     Potassium   Date Value Ref Range Status   02/28/2024 4.2 3.5 - 5.1 mmol/L Final     Chloride   Date Value Ref Range Status   02/28/2024 107 95 - 110 mmol/L Final     Magnesium   Date Value Ref Range Status   02/24/2024 2.0 1.6 - 2.6 mg/dL Final     AST   Date Value Ref Range Status   02/28/2024 19 10 - 40 U/L Final     ALT   Date Value Ref Range Status   02/28/2024 14 10 - 44 U/L Final     Alkaline Phosphatase   Date Value Ref Range Status   02/28/2024 88 55 - 135 U/L Final     Total Bilirubin   Date Value Ref Range Status   02/28/2024 0.4 0.1 - 1.0 mg/dL Final     Comment:     For infants and newborns, interpretation of results should be based  on gestational age, weight and in agreement with clinical  observations.    Premature Infant recommended reference ranges:  Up to 24 hours.............<8.0 mg/dL  Up to 48 hours............<12.0 mg/dL  3-5 days..................<15.0 mg/dL  6-29 days.................<15.0 mg/dL       Albumin   Date Value Ref Range Status   02/28/2024 3.8 3.5 - 5.2 g/dL Final     INR   Date Value Ref Range Status   02/28/2024 1.1 0.8 - 1.2 Final     Comment:     Coumadin Therapy:  2.0 - 3.0 for INR for all indicators except mechanical heart valves  and antiphospholipid syndromes which should use 2.5 - 3.5.           Assessment/Plan:     1. Blood loss anemia    2. Other cirrhosis of liver    3. HCC (hepatocellular carcinoma)      Erendira Pretty is a 78 y.o. male withHepatocellular  Carcinoma    Blood loss anemia-no clear lesion found but likely precipitated by patient being on aspirin, Plavix and Xarelto.  Hemoglobin has stabilized.  -continue on aspirin  -will send a message to cardiology and vascular surgeon to better clarify any risk if the patient remains off of the Plavix and Xarelto  -continue on iron supplementation    Other cirrhosis of liver-low meld, well compensated  -     Alpha-Fetoprotein and AFP L-3; Future; Expected date: 03/07/2024  -     WELLINGTON-Gamma-Carboxy Prothrombin (DCP); Future; Expected date: 03/07/2024  -     US Abdomen Limited; Future; Expected date: 03/07/2024  -     Comprehensive Metabolic Panel; Future; Expected date: 03/07/2024  -     CBC Auto Differential; Future; Expected date: 03/07/2024  -     Protime-INR; Future; Expected date: 03/07/2024  -     Comprehensive Metabolic Panel; Future; Expected date: 03/07/2024  -     CBC Auto Differential; Future; Expected date: 03/07/2024  -     Protime-INR; Future; Expected date: 03/07/2024    HCC (hepatocellular carcinoma)-no evidence of recurrent cancer.  Given that patient is 2 years out from treatment without recurrence would recommend going to yearly cross-sectional imaging with ultrasound an additional biomarkers in the intervening 6 months  -     Alpha-Fetoprotein and AFP L-3; Future; Expected date: 03/07/2024  -     WELLINGTON-Gamma-Carboxy Prothrombin (DCP); Future; Expected date: 03/07/2024  -     US Abdomen Limited; Future; Expected date: 03/07/2024    Complicated visit because of extensive interim history and also need to coordinate with other providers    RTC in 3 months with preclinic labs     The patient location is: [place]  The chief complaint leading to consultation is: Cirrhosis    Visit type: audio only    Face to Face time with patient: 0 video was not working  30 minutes minutes of total time spent on the encounter, which includes face to face time and non-face to face time preparing to see the patient (eg,  review of tests), Obtaining and/or reviewing separately obtained history, Documenting clinical information in the electronic or other health record, Independently interpreting results (not separately reported) and communicating results to the patient/family/caregiver, or Care coordination (not separately reported).         Each patient to whom he or she provides medical services by telemedicine is:  (1) informed of the relationship between the physician and patient and the respective role of any other health care provider with respect to management of the patient; and (2) notified that he or she may decline to receive medical services by telemedicine and may withdraw from such care at any time.    Notes:       Anna Wood MD

## 2024-03-07 NOTE — TELEPHONE ENCOUNTER
N/a nurse left vm for pt to call back in regards to rescheduling for sooner appt due to the provider only being in clinic 1/2 day

## 2024-03-11 ENCOUNTER — OFFICE VISIT (OUTPATIENT)
Dept: HEMATOLOGY/ONCOLOGY | Facility: CLINIC | Age: 79
End: 2024-03-11
Payer: MEDICARE

## 2024-03-11 VITALS
HEART RATE: 77 BPM | DIASTOLIC BLOOD PRESSURE: 74 MMHG | SYSTOLIC BLOOD PRESSURE: 123 MMHG | OXYGEN SATURATION: 98 % | BODY MASS INDEX: 34.19 KG/M2 | WEIGHT: 217.81 LBS | HEIGHT: 67 IN

## 2024-03-11 DIAGNOSIS — K74.69 OTHER CIRRHOSIS OF LIVER: ICD-10-CM

## 2024-03-11 DIAGNOSIS — D64.9 ANEMIA, UNSPECIFIED TYPE: ICD-10-CM

## 2024-03-11 DIAGNOSIS — T45.515S: ICD-10-CM

## 2024-03-11 DIAGNOSIS — C22.0 HCC (HEPATOCELLULAR CARCINOMA): ICD-10-CM

## 2024-03-11 DIAGNOSIS — D50.0 IRON DEFICIENCY ANEMIA DUE TO CHRONIC BLOOD LOSS: Primary | ICD-10-CM

## 2024-03-11 PROCEDURE — 3078F DIAST BP <80 MM HG: CPT | Mod: CPTII,S$GLB,, | Performed by: NURSE PRACTITIONER

## 2024-03-11 PROCEDURE — 3074F SYST BP LT 130 MM HG: CPT | Mod: CPTII,S$GLB,, | Performed by: NURSE PRACTITIONER

## 2024-03-11 PROCEDURE — 99999 PR PBB SHADOW E&M-EST. PATIENT-LVL IV: CPT | Mod: PBBFAC,,, | Performed by: NURSE PRACTITIONER

## 2024-03-11 PROCEDURE — 1126F AMNT PAIN NOTED NONE PRSNT: CPT | Mod: CPTII,S$GLB,, | Performed by: NURSE PRACTITIONER

## 2024-03-11 PROCEDURE — 99205 OFFICE O/P NEW HI 60 MIN: CPT | Mod: S$GLB,,, | Performed by: NURSE PRACTITIONER

## 2024-03-11 PROCEDURE — 1157F ADVNC CARE PLAN IN RCRD: CPT | Mod: CPTII,S$GLB,, | Performed by: NURSE PRACTITIONER

## 2024-03-11 PROCEDURE — 1159F MED LIST DOCD IN RCRD: CPT | Mod: CPTII,S$GLB,, | Performed by: NURSE PRACTITIONER

## 2024-03-11 PROCEDURE — 3072F LOW RISK FOR RETINOPATHY: CPT | Mod: CPTII,S$GLB,, | Performed by: NURSE PRACTITIONER

## 2024-03-11 PROCEDURE — 3288F FALL RISK ASSESSMENT DOCD: CPT | Mod: CPTII,S$GLB,, | Performed by: NURSE PRACTITIONER

## 2024-03-11 PROCEDURE — 1160F RVW MEDS BY RX/DR IN RCRD: CPT | Mod: CPTII,S$GLB,, | Performed by: NURSE PRACTITIONER

## 2024-03-11 PROCEDURE — 1101F PT FALLS ASSESS-DOCD LE1/YR: CPT | Mod: CPTII,S$GLB,, | Performed by: NURSE PRACTITIONER

## 2024-03-11 RX ORDER — EPINEPHRINE 0.3 MG/.3ML
0.3 INJECTION SUBCUTANEOUS ONCE AS NEEDED
Status: CANCELLED | OUTPATIENT
Start: 2024-03-11

## 2024-03-11 RX ORDER — DIPHENHYDRAMINE HYDROCHLORIDE 50 MG/ML
50 INJECTION INTRAMUSCULAR; INTRAVENOUS ONCE AS NEEDED
Status: CANCELLED | OUTPATIENT
Start: 2024-03-11

## 2024-03-11 RX ORDER — HEPARIN 100 UNIT/ML
500 SYRINGE INTRAVENOUS
Status: CANCELLED | OUTPATIENT
Start: 2024-03-11

## 2024-03-11 RX ORDER — METHYLPREDNISOLONE SOD SUCC 125 MG
60 VIAL (EA) INJECTION
Status: CANCELLED
Start: 2024-03-11

## 2024-03-11 RX ORDER — SODIUM CHLORIDE 0.9 % (FLUSH) 0.9 %
10 SYRINGE (ML) INJECTION
Status: CANCELLED | OUTPATIENT
Start: 2024-03-11

## 2024-03-11 NOTE — PROGRESS NOTES
Subjective:      Patient ID: Erendira Pretty is a 78 y.o. male.    Chief Complaint: fatigue    HPI:  79 yo male presents to the hematology clinic as a new patient for further evaluation and treatment of iron deficiency anemia.  He has been referred to the clinic by his pcp, Dr. Callaway.  He was seen many years ago in the clinic by Dr. Ramón Westbrook for SEBASTIAN and received IV iron at that time.  Is being followed by hepatology for h/o of hcv cured with cirrhosis and esophageal varices. Liver CA diagnosed x 2.   Has recently reported incident of melena which has resolved.    States that all of his anticoagulants and antiplatelets have been stopped.  Is only taking asa 81 mg PO daily.    States that he has been taking an oral iron supplement for about 15 days not.  He denies an GI upset.      Left leg numbness that started 2/26/2024 about 3 weeks after stopping antiplatelets and anticoagulation.  States it is ok in the morning but when he starts to move around the leg becomes numb.    Family hx of cancer:  Brother: lung cancer  Brother #2: skin cancer  Self: HCC    Drink a few beers a day starting last week.  Denies cigarette smoking.  Denies illicit drug use.      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    Denies f/c/ns.  Had some unintentional weight loss from December till now - about 20 lbs before stent was placed - due to abdominal pain and diarrhea.      Denies any known abnormal lymphadenopathy.      I have reviewed all of the patient's relevant lab work available in the medical record and have utilized this in my evaluation and management recommendations today.      Social History     Socioeconomic History    Marital status:      Spouse name: YAEL    Number of children: 2   Tobacco Use    Smoking status: Former     Current packs/day: 0.00     Average packs/day: 0.5 packs/day for 4.0 years (2.0 ttl pk-yrs)     Types: Cigarettes     Start date: 6/12/1968      Quit date: 1972     Years since quittin.7    Smokeless tobacco: Former     Types: Chew     Quit date: 1976   Substance and Sexual Activity    Alcohol use: Yes     Alcohol/week: 2.0 standard drinks of alcohol     Types: 2 Cans of beer per week    Drug use: No    Sexual activity: Yes     Partners: Female   Social History Narrative    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       Family History   Problem Relation Age of Onset    Heart disease Mother     Heart disease Father     Heart disease Brother     Colon cancer Neg Hx        Past Surgical History:   Procedure Laterality Date    ANGIOGRAM, EXTREMITY, UNILATERAL Left 2024    Procedure: ANGIOGRAM, EXTREMITY, UNILATERAL- Femoral / SMS Stent, Poss General;  Surgeon: ALEX Alfred III, MD;  Location: [facility] OR [address] [place] [address];  Service: Vascular;  Laterality: Left;  mGy : 2698.78  Gy.cm : 229.88  Contrast : 62ml  Fluro time : 13.8min    CARDIAC CATHETERIZATION      CARDIAC SURGERY      CARPAL TUNNEL RELEASE Left     CATARACT EXTRACTION      CATARACT EXTRACTION W/ INTRAOCULAR LENS  IMPLANT, BILATERAL      CATHETERIZATION OF BOTH LEFT AND RIGHT HEART N/A 2018    Procedure: CATHETERIZATION, HEART, BOTH LEFT AND RIGHT;  Surgeon: Frank Gallardo MD;  Location: [facility] CATH LAB;  Service: Cardiology;  Laterality: N/A;    COLONOSCOPY  2013    COLONOSCOPY N/A 2016    Procedure: COLONOSCOPY;  Surgeon: Anna Tomas MD;  Location: [facility];  Service: Endoscopy;  Laterality: N/A;    COLONOSCOPY N/A 2019    Procedure: COLONOSCOPY;  Surgeon: David Carter III, MD;  Location: [facility];  Service: Endoscopy;  Laterality: N/A;    COLONOSCOPY N/A 2023    Procedure: COLONOSCOPY;  Surgeon: Ama Barrera MD;  Location: [facility] ENDO;  Service: Endoscopy;  Laterality: N/A;    COMPUTED TOMOGRAPHY N/A 2019    Procedure: CT (COMPUTED  TOMOGRAPHY);  Surgeon: [facility];  Location: [facility] OR;  Service: General;  Laterality: N/A;  Microwave ablation to be done in CT.  Need CRNA and cart    COMPUTED TOMOGRAPHY N/A 1/25/2022    Procedure: Liver Microwave Ablation;  Surgeon: Red Puentes MD;  [facility];  Service: General;  Laterality: N/A;    CORONARY ARTERY BYPASS GRAFT (CABG) N/A 11/5/2018    Procedure: CORONARY ARTERY BYPASS GRAFT (CABG);  Surgeon: Bear De Luna MD;  Location: [facility] OR;  Service: Cardiothoracic;  Laterality: N/A;  TWO VESSEL BYPASS WITH AORTOTOMY AND EXCISION OF ATHEROSCLEROTIC PLAQUE    CORONARY BYPASS GRAFT ANGIOGRAPHY  3/2/2020    Procedure: Bypass graft study;  Surgeon: Cora Cormier MD;  Location: [facility] CATH LAB;  Service: Cardiology;;    ELBOW BURSA SURGERY Right 2010    ENDOSCOPIC HARVEST OF VEIN Left 11/5/2018    Procedure: SURGICAL PROCUREMENT, VEIN, ENDOSCOPIC;  Surgeon: Bear De Luna MD;  Location: [facility];  Service: Cardiothoracic;  Laterality: Left;    ESOPHAGOGASTRODUODENOSCOPY N/A 7/17/2019    Procedure: ESOPHAGOGASTRODUODENOSCOPY (EGD);  Surgeon: David Carter III, MD;  Location: [facility] ENDO;  Service: Endoscopy;  Laterality: N/A;    ESOPHAGOGASTRODUODENOSCOPY N/A 12/29/2022    Procedure: ESOPHAGOGASTRODUODENOSCOPY (EGD);  Surgeon: Issa Gayle MD;  Location: [facility] ENDO;  Service: Endoscopy;  Laterality: N/A;    ESOPHAGOGASTRODUODENOSCOPY N/A 1/17/2024    Procedure: EGD (ESOPHAGOGASTRODUODENOSCOPY);  Surgeon: Issa Gayle MD;  Location: [facility] ENDO;  Service: Endoscopy;  Laterality: N/A;    ETHMOIDECTOMY Bilateral 3/27/2019    Procedure: ETHMOIDECTOMY;  Surgeon: Alejandro Parkinson MD;  Location: [facility] OR;  Service: ENT;  Laterality: Bilateral;    EYE SURGERY      FOOT SURGERY      FRONTAL SINUS OBLITERATION Bilateral 3/27/2019    Procedure: SINUSOTOMY, FRONTAL SINUS, OBLITERATIVE;  Surgeon: Alejandro Parkinson MD;  Location: [facility] OR;  Service: ENT;  Laterality: Bilateral;    FUNCTIONAL  ENDOSCOPIC SINUS SURGERY (FESS) Bilateral 3/27/2019    Procedure: FESS (FUNCTIONAL ENDOSCOPIC SINUS SURGERY);  Surgeon: Alejandro Parkinson MD;  Location: [facility] OR;  Service: ENT;  Laterality: Bilateral;  Left Keila bullosa resection     INTRALUMINAL GASTROINTESTINAL TRACT IMAGING VIA CAPSULE N/A 2/2/2024    Procedure: IMAGING PROCEDURE, GI TRACT, INTRALUMINAL, VIA CAPSULE;  Surgeon: Cooper Estrella RN;  Location: [facility] ENDO;  Service: Endoscopy;  Laterality: N/A;    KNEE ARTHROSCOPY W/ MENISCAL REPAIR Left 06/2019    dr boykin    KNEE SURGERY Right     LEFT HEART CATHETERIZATION Left 3/2/2020    Procedure: CATHETERIZATION, HEART, LEFT;  Surgeon: Cora Cormier MD;  Location: [facility] CATH LAB;  Service: Cardiology;  Laterality: Left;    LIVER BIOPSY  01/2022    MAXILLARY ANTROSTOMY Bilateral 3/27/2019    Procedure: MAXILLARY ANTROSTOMY;  Surgeon: Alejandro Parkinson MD;  Location: [facility] OR;  Service: ENT;  Laterality: Bilateral;    NASAL SEPTOPLASTY N/A 3/27/2019    Procedure: SEPTOPLASTY, NOSE;  Surgeon: Alejandro Parkinson MD;  Location: [facility] OR;  Service: ENT;  Laterality: N/A;    RECTAL SURGERY  2012    STENT, SUPERIOR MESENTERIC ARTERY Left 1/4/2024    Procedure: STENT, SUPERIOR MESENTERIC ARTERY;  Surgeon: ALEX Alfred III, MD;  Location: [address];  Service: Vascular;  Laterality: Left;    TRANSURETHRAL RESECTION OF PROSTATE (TURP) WITHOUT USE OF LASER N/A 6/19/2018    Procedure: TURP, WITHOUT USING LASER;  Surgeon: Cooper Cordova IV, MD;  Location: [facility];  Service: Urology;  Laterality: N/A;    TRIGGER FINGER RELEASE Left        Past Medical History:   Diagnosis Date    Anticoagulant long-term use     Aortic stenosis     Asthma     Benign prostatic hyperplasia with nocturia     Bilateral carotid artery stenosis 07/21/2021    US CAROTID BILATERAL 07/14/2021 IMPRESSION: Atherosclerosis with suspected stenosis greater than 50% at the right proximal ICA visually. Velocities are discordant and appear improved from prior.  Atherosclerosis on the left with no evidence of flow-limiting stenosis greater than 50%.  FINDINGS: Right: Internal Carotid Artery (ICA): Peak systolic velocity 118 cm/sec. End diastolic velocity 35 cm/sec    BPH (benign prostatic hyperplasia)     CAD (coronary artery disease)     40% lesion 2002;[facility]    Cirrhosis     Claudication 04/09/2014    Colon polyp     COPD (chronic obstructive pulmonary disease)     ED (erectile dysfunction)     Encounter for blood transfusion     Ex-smoker     Hearing loss NEC     Hepatitis C     Cured;dr gibbs harvoni 2015    Hyperlipidemia     Hypertension     Hypothyroid     s/p tx graves    Open angle with borderline findings and low glaucoma risk in both eyes 09/22/2020    DELONTE on CPAP     Osteoarthritis     Paroxysmal atrial fibrillation     PVD (peripheral vascular disease)     Secondary esophageal varices without bleeding 06/26/2017    Type 2 diabetes mellitus        Review of Systems   Constitutional:  Positive for fatigue.   HENT:  Negative for nosebleeds.    Eyes: Negative.    Respiratory:  Negative for chest tightness and shortness of breath.    Cardiovascular:  Positive for palpitations. Negative for chest pain.   Gastrointestinal:  Negative for abdominal pain, anal bleeding, blood in stool and constipation.   Endocrine: Negative.    Genitourinary:  Negative for hematuria.   Musculoskeletal: Negative.    Skin: Negative.    Allergic/Immunologic: Negative.    Neurological:  Positive for numbness (left leg going numb).   Psychiatric/Behavioral: Negative.            Medication List with Changes/Refills   Current Medications    ACCU-CHEK GRACE PLUS METER MISC    AS DIRECTED    ALBUTEROL (VENTOLIN HFA) 90 MCG/ACTUATION INHALER    Inhale 2 puffs into the lungs every 6 (six) hours as needed for Wheezing or Shortness of Breath. Rescue    AMLODIPINE (NORVASC) 10 MG TABLET    TAKE 1 TABLET BY MOUTH ONCE A DAY (DOSE INCREASE)    BLOOD SUGAR DIAGNOSTIC (ACCU-CHEK GRACE PLUS TEST STRP)  "STRP    TEST THREE TIMES A DAY    BUDESONIDE-FORMOTEROL 160-4.5 MCG (SYMBICORT) 160-4.5 MCG/ACTUATION HFAA    INHALE 2 PUFFS INTO THE LUNGS TWO TIMES A DAY.    CARVEDILOL (COREG) 6.25 MG TABLET    Take 1 tablet (6.25 mg total) by mouth 2 (two) times daily.    EMPAGLIFLOZIN (JARDIANCE) 25 MG TABLET    Take 1 tablet (25 mg total) by mouth once daily.    FINASTERIDE (PROSCAR) 5 MG TABLET    TAKE 1 TABLET BY MOUTH once daily    FUROSEMIDE (LASIX) 40 MG TABLET    Take 1 tablet (40 mg total) by mouth 2 (two) times daily.    GLUCOSAMINE HCL/CHONDR ROSARIO A NA (OSTEO BI-FLEX ORAL)    Take 1 tablet by mouth 2 (two) times daily.     INSULIN (LANTUS SOLOSTAR U-100 INSULIN) GLARGINE 100 UNITS/ML SUBQ PEN    Inject 44 Units into the skin every evening.    LANCETS (ACCU-CHEK FASTCLIX LANCET DRUM) MISC    TEST TWO TIMES A DAY    LEVOCETIRIZINE (XYZAL) 5 MG TABLET    Take 1 tablet (5 mg total) by mouth every evening.    LEVOTHYROXINE (SYNTHROID) 300 MCG TAB    TAKE 1 TABLET BY MOUTH EVERY MORNING    LISINOPRIL 10 MG TABLET    TAKE 1 BY MOUTH EVERY DAY    METFORMIN (GLUCOPHAGE-XR) 500 MG ER 24HR TABLET    Take 1 tablet (500 mg total) by mouth 2 (two) times daily with meals.    MONTELUKAST (SINGULAIR) 10 MG TABLET    Take 1 tablet (10 mg total) by mouth once daily.    PEN NEEDLE, DIABETIC (BD ULTRA-FINE MINI PEN NEEDLE) 31 GAUGE X 3/16" NDLE    USE AS DIRECTED    PROPAFENONE (RHTHYMOL) 150 MG TAB    Take 1 tablet (150 mg total) by mouth every 8 (eight) hours.    RABEPRAZOLE (ACIPHEX) 20 MG TABLET    Take 1 tablet (20 mg total) by mouth 2 (two) times daily.    RANOLAZINE (RANEXA) 500 MG TB12    Take 1 tablet (500 mg total) by mouth 2 (two) times daily.    REPATHA SURECLICK 140 MG/ML PNIJ    SMARTSI Milligram(s) Topical Every 2 Weeks    SILDENAFIL (VIAGRA) 100 MG TABLET    Take 1/2 to 1 tablet by mouth one hour prior to intercourse. Max 100mg per day        Objective:     Vitals:    24 0825   BP: 123/74   Pulse: 77 "       Physical Exam  Vitals reviewed.   Constitutional:       Appearance: Normal appearance.   HENT:      Head: Normocephalic and atraumatic.      Right Ear: External ear normal.      Left Ear: External ear normal.   Cardiovascular:      Rate and Rhythm: Normal rate and regular rhythm.      Heart sounds: S1 normal and S2 normal. Murmur heard.      Systolic murmur is present with a grade of 3/6.   Pulmonary:      Effort: Pulmonary effort is normal.      Breath sounds: Normal breath sounds.   Abdominal:      General: There is no distension.   Musculoskeletal:         General: Normal range of motion.      Cervical back: Normal range of motion.   Skin:     General: Skin is warm and dry.   Neurological:      General: No focal deficit present.      Mental Status: He is alert and oriented to person, place, and time.   Psychiatric:         Attention and Perception: Attention and perception normal.         Mood and Affect: Mood and affect normal.         Speech: Speech normal.         Behavior: Behavior normal. Behavior is cooperative.         Thought Content: Thought content normal.         Cognition and Memory: Cognition and memory normal.         Judgment: Judgment normal.         Assessment:     Problem List Items Addressed This Visit          Oncology    Iron deficiency anemia due to chronic blood loss - Primary    Relevant Orders    CBC Auto Differential    Comprehensive Metabolic Panel    Ferritin    Iron and TIBC    HCC (hepatocellular carcinoma)    Relevant Orders    Comprehensive Metabolic Panel    Anemia    Relevant Orders    CBC Auto Differential    Comprehensive Metabolic Panel    Ferritin    Iron and TIBC    CBC Auto Differential    Comprehensive Metabolic Panel    Ferritin    Iron and TIBC       GI    Other cirrhosis of liver    Relevant Orders    CBC Auto Differential    Comprehensive Metabolic Panel    Ferritin    Iron and TIBC       Other    Anticoagulants causing adverse effect in therapeutic use     Relevant Orders    CBC Auto Differential    Comprehensive Metabolic Panel    Ferritin    Iron and TIBC       Lab Results   Component Value Date    WBC 4.92 03/07/2024    RBC 4.25 (L) 03/07/2024    HGB 10.1 (L) 03/07/2024    HCT 35.4 (L) 03/07/2024    MCV 83 03/07/2024    MCH 23.8 (L) 03/07/2024    MCHC 28.5 (L) 03/07/2024    RDW 18.7 (H) 03/07/2024     03/07/2024    MPV 10.7 03/07/2024    GRAN 3.1 03/07/2024    GRAN 63.7 03/07/2024    LYMPH 1.1 03/07/2024    LYMPH 21.5 03/07/2024    MONO 0.6 03/07/2024    MONO 11.6 03/07/2024    EOS 0.1 03/07/2024    BASO 0.03 03/07/2024    EOSINOPHIL 2.4 03/07/2024    BASOPHIL 0.6 03/07/2024      Lab Results   Component Value Date     02/28/2024    K 4.2 02/28/2024     02/28/2024    CO2 26 02/28/2024    BUN 17 02/28/2024    CREATININE 1.3 02/28/2024    CREATININE 1.3 02/28/2024    CALCIUM 9.2 02/28/2024    ANIONGAP 8 02/28/2024    ESTGFRAFRICA >60.0 06/28/2022    EGFRNONAA >60.0 06/28/2022     Lab Results   Component Value Date    ALT 14 02/28/2024    AST 19 02/28/2024     (H) 07/28/2008    ALKPHOS 88 02/28/2024    BILITOT 0.4 02/28/2024     Lab Results   Component Value Date    IRON 38 (L) 03/07/2024    TRANSFERRIN 331 03/07/2024    TIBC 490 (H) 03/07/2024    FESATURATED 8 (L) 03/07/2024    FERRITIN 13 (L) 03/07/2024        Plan:   Iron deficiency anemia due to chronic blood loss  -     CBC Auto Differential; Future; Expected date: 03/11/2024  -     Comprehensive Metabolic Panel; Future; Expected date: 03/11/2024  -     Ferritin; Future; Expected date: 03/11/2024  -     Iron and TIBC; Future; Expected date: 03/11/2024    Anemia, unspecified type  -     Ambulatory referral/consult to Hematology / Oncology  -     CBC Auto Differential; Future; Expected date: 03/11/2024  -     Comprehensive Metabolic Panel; Future; Expected date: 03/11/2024  -     Ferritin; Future; Expected date: 03/11/2024  -     Iron and TIBC; Future; Expected date: 03/11/2024    Other  cirrhosis of liver  -     CBC Auto Differential; Future; Expected date: 03/11/2024  -     Comprehensive Metabolic Panel; Future; Expected date: 03/11/2024  -     Ferritin; Future; Expected date: 03/11/2024  -     Iron and TIBC; Future; Expected date: 03/11/2024    HCC (hepatocellular carcinoma)  -     Comprehensive Metabolic Panel; Future; Expected date: 03/11/2024    Anticoagulant causing adverse effect in therapeutic use, sequela  -     CBC Auto Differential; Future; Expected date: 03/11/2024  -     Comprehensive Metabolic Panel; Future; Expected date: 03/11/2024  -     Ferritin; Future; Expected date: 03/11/2024  -     Iron and TIBC; Future; Expected date: 03/11/2024    Other orders  -     ferumoxytoL (FERAHEME) 510 mg in dextrose 5 % (D5W) 117 mL IVPB  -     methylPREDNISolone sodium succinate injection 60 mg  -     EPINEPHrine (EPIPEN) 0.3 mg/0.3 mL pen injection 0.3 mg  -     diphenhydrAMINE injection 50 mg  -     hydrocortisone sodium succinate injection 100 mg  -     Cancel: heparin, porcine (PF) 100 unit/mL injection flush 500 Units  -     sodium chloride 0.9% flush 10 mL  -     sodium chloride 0.9% 100 mL flush bag      Med Onc Chart Routing      Follow up with physician    Follow up with VASILIY . F/u 6 weeks after last dose iron and labs prior - [facility].   Infusion scheduling note   Schedule feraheme infusions x 2 at first available   Injection scheduling note n/a   Labs   Scheduling:  Preferred lab: [facility]  Lab interval:  cbc, cmp, iron studies   Imaging   N/a   Pharmacy appointment No pharmacy appointment needed      Other referrals       No additional referrals needed  n/a          Patient had been on Xarelto many years without GI bleed.  Was placed on plavix and asa due to stent placement along with Xarelto and had 2 episodes of melena which has resolved spontaneously.  Complete GI workup done.  Hemoglobin has improved with oral iron supplementation; however due to patient's symptoms will  treat Feraheme infusions x 2.  He will stop oral iron once receives first dose of IV iron.    Total time spent on encounter: 60 minutes    Collaborating Provider:  Dr. Antonio Pineda    Thank You,  TANIA Harris-GABRIEL  Benign Hematology

## 2024-03-13 ENCOUNTER — OFFICE VISIT (OUTPATIENT)
Dept: CARDIOLOGY | Facility: CLINIC | Age: 79
End: 2024-03-13
Payer: MEDICARE

## 2024-03-13 VITALS
HEART RATE: 85 BPM | DIASTOLIC BLOOD PRESSURE: 66 MMHG | HEIGHT: 67 IN | BODY MASS INDEX: 34.67 KG/M2 | OXYGEN SATURATION: 98 % | SYSTOLIC BLOOD PRESSURE: 122 MMHG | WEIGHT: 220.88 LBS | RESPIRATION RATE: 16 BRPM

## 2024-03-13 DIAGNOSIS — Z95.1 S/P CABG X 2: Primary | ICD-10-CM

## 2024-03-13 DIAGNOSIS — I48.0 PAROXYSMAL ATRIAL FIBRILLATION: ICD-10-CM

## 2024-03-13 PROCEDURE — 1101F PT FALLS ASSESS-DOCD LE1/YR: CPT | Mod: CPTII,S$GLB,, | Performed by: INTERNAL MEDICINE

## 2024-03-13 PROCEDURE — 99999 PR PBB SHADOW E&M-EST. PATIENT-LVL V: CPT | Mod: PBBFAC,,, | Performed by: INTERNAL MEDICINE

## 2024-03-13 PROCEDURE — 3074F SYST BP LT 130 MM HG: CPT | Mod: CPTII,S$GLB,, | Performed by: INTERNAL MEDICINE

## 2024-03-13 PROCEDURE — 3288F FALL RISK ASSESSMENT DOCD: CPT | Mod: CPTII,S$GLB,, | Performed by: INTERNAL MEDICINE

## 2024-03-13 PROCEDURE — 1157F ADVNC CARE PLAN IN RCRD: CPT | Mod: CPTII,S$GLB,, | Performed by: INTERNAL MEDICINE

## 2024-03-13 PROCEDURE — 99204 OFFICE O/P NEW MOD 45 MIN: CPT | Mod: S$GLB,,, | Performed by: INTERNAL MEDICINE

## 2024-03-13 PROCEDURE — 3078F DIAST BP <80 MM HG: CPT | Mod: CPTII,S$GLB,, | Performed by: INTERNAL MEDICINE

## 2024-03-13 PROCEDURE — 1159F MED LIST DOCD IN RCRD: CPT | Mod: CPTII,S$GLB,, | Performed by: INTERNAL MEDICINE

## 2024-03-13 PROCEDURE — 3072F LOW RISK FOR RETINOPATHY: CPT | Mod: CPTII,S$GLB,, | Performed by: INTERNAL MEDICINE

## 2024-03-13 NOTE — Clinical Note
Sami Kelly and Tima,  I saw Mr Maria De Jesus for AF management. I recommend he get an AF ablation as well as a Watchman. This would require at least 6 weeks of full dose anticoagulation then antiplatelet therapy for another 3-4 months. He had melena with xarelto and plavix. How long is the antiplatelet requirement after his vascular stent? I can schedule my cases once this is complete to avoid xarelto plus plavix.   Any thought or comments?  Thank you Horacio Huerta

## 2024-03-13 NOTE — PROGRESS NOTES
Subjective:   Patient ID:  Erendira Pretty is a 78 y.o. male who presents for evaluation of Atrial Fibrillation      78 yoM CAD/CABG here for arrhythmia management. AF noted 8/23. He was stared on rythmol but no rhythm control via CV attempt was made. He remained in AF 2/7/24, SR with long first degree AVB later in February. He had a vascular intervention in his abdomen 1/24 with subsequent GI bleeding later that month. He has been off OAC since the GI bleeding event.     CHADSVASC of 4  HAS BLED of 4    Echo 2/24:  ·  Left Ventricle: The left ventricle is normal in size. Normal wall thickness. There is mild concentric hypertrophy. There is normal systolic function with a visually estimated ejection fraction of 60 - 65%. There is normal diastolic function.  ·  Right Ventricle: Normal right ventricular cavity size. Wall thickness is normal. Right ventricle wall motion  is normal. Systolic function is normal.  ·  Left Atrium: Left atrium is severely dilated.  ·  Aortic Valve: There is moderate to severe stenosis. Aortic valve area by VTI is 0.96 cm². Aortic valve peak velocity is 3.76 m/s. Mean gradient is 31 mmHg. The dimensionless index is 0.28. There is mild aortic regurgitation.  ·  Mitral Valve: There is mild regurgitation.  ·  Tricuspid Valve: There is moderate regurgitation.  ·  Pulmonary Artery: There is moderate pulmonary hypertension. The estimated pulmonary artery systolic pressure is 58 mmHg.  ·  IVC/SVC: Intermediate venous pressure at 8 mmHg.    Past Medical History:  No date: Anticoagulant long-term use  No date: Aortic stenosis  No date: Asthma  No date: Benign prostatic hyperplasia with nocturia  07/21/2021: Bilateral carotid artery stenosis      Comment:  US CAROTID BILATERAL 07/14/2021 IMPRESSION:                Atherosclerosis with suspected stenosis greater than 50%                at the right proximal ICA visually. Velocities are                discordant and appear improved from prior.                 Atherosclerosis on the left with no evidence of                flow-limiting stenosis greater than 50%.  FINDINGS:                Right: Internal Carotid Artery (ICA): Peak systolic                velocity 118 cm/sec. End diastolic velocity 35 cm/sec  No date: BPH (benign prostatic hyperplasia)  No date: CAD (coronary artery disease)      Comment:  40% lesion 2002;lazo  No date: Cirrhosis  04/09/2014: Claudication  No date: Colon polyp  No date: COPD (chronic obstructive pulmonary disease)  No date: ED (erectile dysfunction)  No date: Encounter for blood transfusion  No date: Ex-smoker  No date: Hearing loss NEC  No date: Hepatitis C      Comment:  Cured;reji brown 2015  No date: Hyperlipidemia  No date: Hypertension  No date: Hypothyroid      Comment:  s/p tx graves  09/22/2020: Open angle with borderline findings and low glaucoma risk   in both eyes  No date: DELONTE on CPAP  No date: Osteoarthritis  No date: Paroxysmal atrial fibrillation  No date: PVD (peripheral vascular disease)  06/26/2017: Secondary esophageal varices without bleeding  No date: Type 2 diabetes mellitus    Past Surgical History:  1/4/2024: ANGIOGRAM, EXTREMITY, UNILATERAL; Left      Comment:  Procedure: ANGIOGRAM, EXTREMITY, UNILATERAL- Femoral /                SMS Stent, Poss General;  Surgeon: ALEX Alfred III, MD;  Location: [facility] OR [address] [place] [address];  Service: Vascular;                 Laterality: Left;  mGy : 2698.78Gy.cm :                229.88Contrast : 62mlFluro time : 13.8min  No date: CARDIAC CATHETERIZATION  No date: CARDIAC SURGERY  No date: CARPAL TUNNEL RELEASE; Left  No date: CATARACT EXTRACTION  2008: CATARACT EXTRACTION W/ INTRAOCULAR LENS  IMPLANT, BILATERAL  11/2/2018: CATHETERIZATION OF BOTH LEFT AND RIGHT HEART; N/A      Comment:  Procedure: CATHETERIZATION, HEART, BOTH LEFT AND RIGHT;                Surgeon: Frank Lazo MD;  Location: [facility] CATH                LAB;  Service: Cardiology;   Laterality: N/A;  8/2013: COLONOSCOPY  5/30/2016: COLONOSCOPY; N/A      Comment:  Procedure: COLONOSCOPY;  Surgeon: Anna Tomas MD;                 Location: [facility] ENDO;  Service: Endoscopy;  Laterality:                N/A;  7/17/2019: COLONOSCOPY; N/A      Comment:  Procedure: COLONOSCOPY;  Surgeon: David Carter III, MD;  Location: [facility];  Service: Endoscopy;                 Laterality: N/A;  12/14/2023: COLONOSCOPY; N/A      Comment:  Procedure: COLONOSCOPY;  Surgeon: Ama Barrera MD;  Location: [facility];  Service: Endoscopy;                 Laterality: N/A;  9/9/2019: COMPUTED TOMOGRAPHY; N/A      Comment:  Procedure: CT (COMPUTED TOMOGRAPHY);  Surgeon: [facility]                Diagnostic Provider;  Location: [facility] OR;  Service:                General;  Laterality: N/A;  Microwave ablation to be done               in CT.Need CRNA and cart  1/25/2022: COMPUTED TOMOGRAPHY; N/A      Comment:  Procedure: Liver Microwave Ablation;  Surgeon: Red Puentes MD;  Location: [facility];  Service: General;                 Laterality: N/A;  11/5/2018: CORONARY ARTERY BYPASS GRAFT (CABG); N/A      Comment:  Procedure: CORONARY ARTERY BYPASS GRAFT (CABG);                 Surgeon: Bear De Luna MD;  Location: [facility];                 Service: Cardiothoracic;  Laterality: N/A;  TWO VESSEL                BYPASS WITH AORTOTOMY AND EXCISION OF ATHEROSCLEROTIC                PLAQUE  3/2/2020: CORONARY BYPASS GRAFT ANGIOGRAPHY      Comment:  Procedure: Bypass graft study;  Surgeon: Cora Cormier MD;  Location: [facility] CATH LAB;  Service: Cardiology;;  2010: ELBOW BURSA SURGERY; Right  11/5/2018: ENDOSCOPIC HARVEST OF VEIN; Left      Comment:  Procedure: SURGICAL PROCUREMENT, VEIN, ENDOSCOPIC;                 Surgeon: Bear De Luna MD;  Location: [facility];                 Service: Cardiothoracic;  Laterality: Left;  7/17/2019: ESOPHAGOGASTRODUODENOSCOPY; N/A       Comment:  Procedure: ESOPHAGOGASTRODUODENOSCOPY (EGD);  Surgeon:                David Carter III, MD;  Location: [facility] ENDO;  Service:               Endoscopy;  Laterality: N/A;  12/29/2022: ESOPHAGOGASTRODUODENOSCOPY; N/A      Comment:  Procedure: ESOPHAGOGASTRODUODENOSCOPY (EGD);  Surgeon:                Issa Gayle MD;  Location: [facility];  Service:               Endoscopy;  Laterality: N/A;  1/17/2024: ESOPHAGOGASTRODUODENOSCOPY; N/A      Comment:  Procedure: EGD (ESOPHAGOGASTRODUODENOSCOPY);  Surgeon:                Issa Gayle MD;  Location: [facility] ENDO;                 Service: Endoscopy;  Laterality: N/A;  3/27/2019: ETHMOIDECTOMY; Bilateral      Comment:  Procedure: ETHMOIDECTOMY;  Surgeon: Alejandro Parkinson MD;                 Location: [facility] OR;  Service: ENT;  Laterality: Bilateral;  No date: EYE SURGERY  No date: FOOT SURGERY  3/27/2019: FRONTAL SINUS OBLITERATION; Bilateral      Comment:  Procedure: SINUSOTOMY, FRONTAL SINUS, OBLITERATIVE;                 Surgeon: Alejandro Parkinson MD;  Location: [facility] OR;  Service:                ENT;  Laterality: Bilateral;  3/27/2019: FUNCTIONAL ENDOSCOPIC SINUS SURGERY (FESS); Bilateral      Comment:  Procedure: FESS (FUNCTIONAL ENDOSCOPIC SINUS SURGERY);                 Surgeon: Alejandro Parkinson MD;  Location: [facility] OR;  Service:                ENT;  Laterality: Bilateral;  Left Keila bullosa                resection   2/2/2024: INTRALUMINAL GASTROINTESTINAL TRACT IMAGING VIA CAPSULE; N/A      Comment:  Procedure: IMAGING PROCEDURE, GI TRACT, INTRALUMINAL,                VIA CAPSULE;  Surgeon: Cooper Estrella RN;  Location: [facility];  Service: Endoscopy;  Laterality: N/A;  06/2019: KNEE ARTHROSCOPY W/ MENISCAL REPAIR; Left      Comment:  dr boykin  No date: KNEE SURGERY; Right  3/2/2020: LEFT HEART CATHETERIZATION; Left      Comment:  Procedure: CATHETERIZATION, HEART, LEFT;  Surgeon: Cora Cormier MD;  Location: [facility] CATH LAB;   Service:                Cardiology;  Laterality: Left;  2022: LIVER BIOPSY  3/27/2019: MAXILLARY ANTROSTOMY; Bilateral      Comment:  Procedure: MAXILLARY ANTROSTOMY;  Surgeon: Alejandro Parkinson MD;  Location: [facility] OR;  Service: ENT;  Laterality:                Bilateral;  3/27/2019: NASAL SEPTOPLASTY; N/A      Comment:  Procedure: SEPTOPLASTY, NOSE;  Surgeon: Alejandro Parkinson MD;                 Location: [facility] OR;  Service: ENT;  Laterality: N/A;  2012: RECTAL SURGERY  2024: STENT, SUPERIOR MESENTERIC ARTERY; Left      Comment:  Procedure: STENT, SUPERIOR MESENTERIC ARTERY;  Surgeon:                ALEX Alfred III, MD;  [facility];                 Service: Vascular;  Laterality: Left;  2018: TRANSURETHRAL RESECTION OF PROSTATE (TURP) WITHOUT USE OF   LASER; N/A      Comment:  Procedure: TURP, WITHOUT USING LASER;  Surgeon: Cooper Cordova IV, MD;  Location: [facility] OR;  Service: Urology;                 Laterality: N/A;  No date: TRIGGER FINGER RELEASE; Left    Social History    Socioeconomic History      Marital status:       Spouse name: YAEL      Number of children: 2    Tobacco Use      Smoking status: Former        Packs/day: 0.00        Years: 0.5 packs/day for 4.0 years (2.0 ttl pk-yrs)        Types: Cigarettes        Start date: 1968        Quit date: 1972        Years since quittin.7      Smokeless tobacco: Former        Types: Chew        Quit date: 1976    Substance and Sexual Activity      Alcohol use: Yes        Alcohol/week: 2.0 standard drinks of alcohol        Types: 2 Cans of beer per week      Drug use: No      Sexual activity: Yes        Partners: Female    Social History Narrative      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       wife passed away       No pets or smokers in household, lives alone.      Review of patient's family history indicates:  Problem: Heart disease       Relation: Mother          Age of Onset: (Not Specified)  Problem: Heart disease      Relation: Father          Age of Onset: (Not Specified)  Problem: Heart disease      Relation: Brother          Age of Onset: (Not Specified)  Problem: Colon cancer      Relation: Neg Hx          Age of Onset: (Not Specified)      Current Outpatient Medications:  ACCU-CHEK GRACE PLUS METER Misc, AS DIRECTED, Disp: 1 each, Rfl: 0  albuterol (VENTOLIN HFA) 90 mcg/actuation inhaler, Inhale 2 puffs into the lungs every 6 (six) hours as needed for Wheezing or Shortness of Breath. Rescue, Disp: 18 g, Rfl: 3  amLODIPine (NORVASC) 10 MG tablet, TAKE 1 TABLET BY MOUTH ONCE A DAY (DOSE INCREASE) (Patient taking differently: Take by mouth every morning.), Disp: 30 tablet, Rfl: 11  blood sugar diagnostic (ACCU-CHEK GRACE PLUS TEST STRP) Strp, TEST THREE TIMES A DAY, Disp: 100 strip, Rfl: 11  budesonide-formoterol 160-4.5 mcg (SYMBICORT) 160-4.5 mcg/actuation HFAA, INHALE 2 PUFFS INTO THE LUNGS TWO TIMES A DAY., Disp: 10.2 g, Rfl: 11  carvediloL (COREG) 6.25 MG tablet, Take 1 tablet (6.25 mg total) by mouth 2 (two) times daily., Disp: 60 tablet, Rfl: 11  empagliflozin (JARDIANCE) 25 mg tablet, Take 1 tablet (25 mg total) by mouth once daily. (Patient taking differently: Take 25 mg by mouth every morning.), Disp: 90 tablet, Rfl: 1  finasteride (PROSCAR) 5 mg tablet, TAKE 1 TABLET BY MOUTH once daily, Disp: 90 tablet, Rfl: 2  furosemide (LASIX) 40 MG tablet, Take 1 tablet (40 mg total) by mouth 2 (two) times daily., Disp: 60 tablet, Rfl: 11  GLUCOSAMINE HCL/CHONDR ROSARIO A NA (OSTEO BI-FLEX ORAL), Take 1 tablet by mouth 2 (two) times daily. , Disp: , Rfl:   insulin (LANTUS SOLOSTAR U-100 INSULIN) glargine 100 units/mL SubQ pen, Inject 44 Units into the skin every evening., Disp: 45 mL, Rfl: 1  lancets (ACCU-CHEK FASTCLIX LANCET DRUM) [facility] [place], TEST TWO TIMES A DAY, Disp: 200 each, Rfl: 5  levocetirizine (XYZAL) 5 MG tablet, Take 1 tablet (5 mg total) by  "mouth every evening., Disp: 30 tablet, Rfl: 11  levothyroxine (SYNTHROID) 300 MCG Tab, TAKE 1 TABLET BY MOUTH EVERY MORNING, Disp: 90 tablet, Rfl: 3  lisinopriL 10 MG tablet, TAKE 1 BY MOUTH EVERY DAY (Patient taking differently: Take 10 mg by mouth every morning.), Disp: 30 tablet, Rfl: 7  metFORMIN (GLUCOPHAGE-XR) 500 MG ER 24hr tablet, Take 1 tablet (500 mg total) by mouth 2 (two) times daily with meals., Disp: 180 tablet, Rfl: 3  montelukast (SINGULAIR) 10 mg tablet, Take 1 tablet (10 mg total) by mouth once daily., Disp: 90 tablet, Rfl: 3  pen needle, diabetic (BD ULTRA-FINE MINI PEN NEEDLE) 31 gauge x 3/16" Ndle, USE AS DIRECTED, Disp: 100 each, Rfl: 11  propafenone (RHTHYMOL) 150 MG Tab, Take 1 tablet (150 mg total) by mouth every 8 (eight) hours., Disp: 90 tablet, Rfl: 11  RABEprazole (ACIPHEX) 20 mg tablet, Take 1 tablet (20 mg total) by mouth 2 (two) times daily., Disp: 180 tablet, Rfl: 3  ranolazine (RANEXA) 500 MG Tb12, Take 1 tablet (500 mg total) by mouth 2 (two) times daily., Disp: 60 tablet, Rfl: 1  REPATHA SURECLICK 140 mg/mL PnIj, SMARTSI Milligram(s) Topical Every 2 Weeks, Disp: , Rfl:   sildenafiL (VIAGRA) 100 MG tablet, Take 1/2 to 1 tablet by mouth one hour prior to intercourse. Max 100mg per day, Disp: 30 tablet, Rfl: 11    No current facility-administered medications for this visit.  Facility-Administered Medications Ordered in Other Visits:  lactated ringers infusion, , Intravenous, Continuous, Omaira Theodore MD, New Bag at 19 1117                Review of Systems   Constitutional: Negative. Negative for weight gain.   HENT: Negative.     Eyes: Negative.    Cardiovascular: Negative.  Negative for chest pain, leg swelling and palpitations.   Respiratory: Negative.  Negative for shortness of breath.    Endocrine: Negative.    Hematologic/Lymphatic: Negative.    Skin: Negative.    Musculoskeletal:  Negative for muscle weakness.   Gastrointestinal: Negative.  "   Genitourinary: Negative.    Neurological: Negative.  Negative for dizziness.   Psychiatric/Behavioral: Negative.     Allergic/Immunologic: Negative.    All other systems reviewed and are negative.      Objective:   Physical Exam  Vitals and nursing note reviewed.   Constitutional:       Appearance: He is well-developed.   HENT:      Head: Normocephalic and atraumatic.   Eyes:      Conjunctiva/sclera: Conjunctivae normal.      Pupils: Pupils are equal, round, and reactive to light.   Cardiovascular:      Rate and Rhythm: Normal rate and regular rhythm.      Pulses: Intact distal pulses.      Heart sounds: Normal heart sounds.   Pulmonary:      Effort: Pulmonary effort is normal.      Breath sounds: Normal breath sounds.   Abdominal:      General: Bowel sounds are normal.      Palpations: Abdomen is soft.   Musculoskeletal:      Cervical back: Normal range of motion and neck supple.   Skin:     General: Skin is warm and dry.   Neurological:      Mental Status: He is alert and oriented to person, place, and time.         Assessment:      1. S/P CABG x 2    2. Paroxysmal atrial fibrillation        Plan:     78 yoM here for AF management. He has persistent AF that is refractory to AAD therapy. He recently converted to NSR. He has had vascular intervention (peripheral stent by Dr Alfred) in his gastric system leading to addition of plavix to xarelto. He then had melena with blood loss anemia requiring cessation of xarelto. No recurrence bleeding. There has been no determination on when or if he should resume OAC and/or plavix. When he is able to take OAC, I would recommend PVI followed by Watchman. Will discuss with his other providers. RTC 6w      Addendum:  Discussed with Dr Alfred. Plavix no longer indicated. Will begin to plan PVI and LAAO.     I had extensive discussion with patient regarding risks and benefits of PVI/WACA, and the patient would like to proceed. Risks of procedure include (but are not  limited to) bleeding, stroke, perforation requiring emergency draining or surgery, AV block, death, AV fistula, AE fistula, PV stenosis.    Last anti-coagulation dose night prior to procedure.  Discontinue antiarrhytmic drugs 4 days prior to the procedure.     RF PVI  Anesthesia  BERNARD prior, cancel if NSR  ZIA    With regard to Watchman, 6w after PVI:  The patient can tolerate short term OAC but is not a candidate for long term OAC due to recurrent bleeding issues. I discussed management options regarding LAAO. I discussed the process of LAAO via Watchman including pre-procedure testing  as well as the need to take OAC for 6 weeks post implant. We discussed post procedure BERNARD studies to assess LEO occlusion. Additionally I mentioned the need to take DAPT up to the 6 month point post implant.      Watchman with Anesthesia support

## 2024-03-19 ENCOUNTER — INFUSION (OUTPATIENT)
Dept: INFUSION THERAPY | Facility: HOSPITAL | Age: 79
End: 2024-03-19
Attending: NURSE PRACTITIONER
Payer: MEDICARE

## 2024-03-19 VITALS
OXYGEN SATURATION: 99 % | SYSTOLIC BLOOD PRESSURE: 121 MMHG | BODY MASS INDEX: 35.02 KG/M2 | HEIGHT: 67 IN | DIASTOLIC BLOOD PRESSURE: 70 MMHG | HEART RATE: 76 BPM | WEIGHT: 223.13 LBS | RESPIRATION RATE: 18 BRPM | TEMPERATURE: 98 F

## 2024-03-19 DIAGNOSIS — D50.0 IRON DEFICIENCY ANEMIA DUE TO CHRONIC BLOOD LOSS: Primary | ICD-10-CM

## 2024-03-19 PROCEDURE — 96375 TX/PRO/DX INJ NEW DRUG ADDON: CPT

## 2024-03-19 PROCEDURE — 96365 THER/PROPH/DIAG IV INF INIT: CPT

## 2024-03-19 PROCEDURE — 25000003 PHARM REV CODE 250: Performed by: NURSE PRACTITIONER

## 2024-03-19 PROCEDURE — 63600175 PHARM REV CODE 636 W HCPCS: Mod: JZ,JG | Performed by: NURSE PRACTITIONER

## 2024-03-19 RX ORDER — DIPHENHYDRAMINE HYDROCHLORIDE 50 MG/ML
50 INJECTION INTRAMUSCULAR; INTRAVENOUS ONCE AS NEEDED
Status: CANCELLED | OUTPATIENT
Start: 2024-03-19

## 2024-03-19 RX ORDER — SODIUM CHLORIDE 0.9 % (FLUSH) 0.9 %
10 SYRINGE (ML) INJECTION
Status: DISCONTINUED | OUTPATIENT
Start: 2024-03-19 | End: 2024-03-19 | Stop reason: HOSPADM

## 2024-03-19 RX ORDER — EPINEPHRINE 0.3 MG/.3ML
0.3 INJECTION SUBCUTANEOUS ONCE AS NEEDED
Status: CANCELLED | OUTPATIENT
Start: 2024-03-19

## 2024-03-19 RX ORDER — METHYLPREDNISOLONE SOD SUCC 125 MG
60 VIAL (EA) INJECTION
Status: CANCELLED
Start: 2024-03-19

## 2024-03-19 RX ORDER — SODIUM CHLORIDE 0.9 % (FLUSH) 0.9 %
10 SYRINGE (ML) INJECTION
Status: CANCELLED | OUTPATIENT
Start: 2024-03-19

## 2024-03-19 RX ORDER — METHYLPREDNISOLONE SOD SUCC 125 MG
60 VIAL (EA) INJECTION
Status: COMPLETED | OUTPATIENT
Start: 2024-03-19 | End: 2024-03-19

## 2024-03-19 RX ADMIN — FERUMOXYTOL 510 MG: 510 INJECTION INTRAVENOUS at 07:03

## 2024-03-19 RX ADMIN — METHYLPREDNISOLONE SODIUM SUCCINATE 60 MG: 125 INJECTION, POWDER, FOR SOLUTION INTRAMUSCULAR; INTRAVENOUS at 07:03

## 2024-03-19 NOTE — NURSING
Infusion# 1 of 2    Recent labs? Reviewed    Premeds? Solumedrol 60 mg IVP    S/S of current or recent infections in the past 14 days? None/Denies    Recent Surgery/invasive procedures? None/Denies     Any recent vaccines ? None/Denies    Feraheme 510 mg administered IV at a 17 minute rate per orders; see MAR and vitals for more  Details.

## 2024-03-22 ENCOUNTER — TELEPHONE (OUTPATIENT)
Dept: ELECTROPHYSIOLOGY | Facility: CLINIC | Age: 79
End: 2024-03-22
Payer: MEDICARE

## 2024-03-25 ENCOUNTER — HOSPITAL ENCOUNTER (EMERGENCY)
Facility: HOSPITAL | Age: 79
Discharge: HOME | End: 2024-03-25
Attending: EMERGENCY MEDICINE
Payer: MEDICARE

## 2024-03-25 VITALS
OXYGEN SATURATION: 99 % | DIASTOLIC BLOOD PRESSURE: 70 MMHG | BODY MASS INDEX: 34.53 KG/M2 | WEIGHT: 220.44 LBS | TEMPERATURE: 98 F | HEART RATE: 84 BPM | SYSTOLIC BLOOD PRESSURE: 148 MMHG | RESPIRATION RATE: 22 BRPM

## 2024-03-25 DIAGNOSIS — R07.9 CHEST PAIN: ICD-10-CM

## 2024-03-25 DIAGNOSIS — Z01.812 PRE-PROCEDURE LAB EXAM: ICD-10-CM

## 2024-03-25 DIAGNOSIS — R07.9 CHEST PAIN, UNSPECIFIED TYPE: Primary | ICD-10-CM

## 2024-03-25 DIAGNOSIS — I48.0 PAROXYSMAL ATRIAL FIBRILLATION: Primary | ICD-10-CM

## 2024-03-25 LAB
ALBUMIN SERPL BCP-MCNC: 3.8 G/DL (ref 3.5–5.2)
ALP SERPL-CCNC: 69 U/L (ref 55–135)
ALT SERPL W/O P-5'-P-CCNC: 17 U/L (ref 10–44)
ANION GAP SERPL CALC-SCNC: 11 MMOL/L (ref 8–16)
ANISOCYTOSIS BLD QL SMEAR: SLIGHT
AST SERPL-CCNC: 20 U/L (ref 10–40)
BASOPHILS # BLD AUTO: 0.02 K/UL (ref 0–0.2)
BASOPHILS NFR BLD: 0.4 % (ref 0–1.9)
BILIRUB SERPL-MCNC: 0.4 MG/DL (ref 0.1–1)
BNP SERPL-MCNC: 135 PG/ML (ref 0–99)
BUN SERPL-MCNC: 18 MG/DL (ref 8–23)
CALCIUM SERPL-MCNC: 9 MG/DL (ref 8.7–10.5)
CHLORIDE SERPL-SCNC: 102 MMOL/L (ref 95–110)
CO2 SERPL-SCNC: 25 MMOL/L (ref 23–29)
CREAT SERPL-MCNC: 1.1 MG/DL (ref 0.5–1.4)
D DIMER PPP IA.FEU-MCNC: 1.15 MG/L FEU
DACRYOCYTES BLD QL SMEAR: ABNORMAL
DIFFERENTIAL METHOD BLD: ABNORMAL
EOSINOPHIL # BLD AUTO: 0.1 K/UL (ref 0–0.5)
EOSINOPHIL NFR BLD: 2.3 % (ref 0–8)
ERYTHROCYTE [DISTWIDTH] IN BLOOD BY AUTOMATED COUNT: 22.1 % (ref 11.5–14.5)
EST. GFR  (NO RACE VARIABLE): >60 ML/MIN/1.73 M^2
GLUCOSE SERPL-MCNC: 118 MG/DL (ref 70–110)
HCT VFR BLD AUTO: 35.3 % (ref 40–54)
HGB BLD-MCNC: 10.8 G/DL (ref 14–18)
HIV 1+2 AB+HIV1 P24 AG SERPL QL IA: NEGATIVE
IMM GRANULOCYTES # BLD AUTO: 0.03 K/UL (ref 0–0.04)
IMM GRANULOCYTES NFR BLD AUTO: 0.6 % (ref 0–0.5)
LYMPHOCYTES # BLD AUTO: 0.9 K/UL (ref 1–4.8)
LYMPHOCYTES NFR BLD: 19.8 % (ref 18–48)
MCH RBC QN AUTO: 24.4 PG (ref 27–31)
MCHC RBC AUTO-ENTMCNC: 30.6 G/DL (ref 32–36)
MCV RBC AUTO: 80 FL (ref 82–98)
MONOCYTES # BLD AUTO: 0.5 K/UL (ref 0.3–1)
MONOCYTES NFR BLD: 11.1 % (ref 4–15)
NEUTROPHILS # BLD AUTO: 3.1 K/UL (ref 1.8–7.7)
NEUTROPHILS NFR BLD: 65.8 % (ref 38–73)
NRBC BLD-RTO: 0 /100 WBC
PLATELET # BLD AUTO: 197 K/UL (ref 150–450)
PLATELET BLD QL SMEAR: ABNORMAL
PMV BLD AUTO: 9.3 FL (ref 9.2–12.9)
POTASSIUM SERPL-SCNC: 4.1 MMOL/L (ref 3.5–5.1)
PROT SERPL-MCNC: 7.4 G/DL (ref 6–8.4)
RBC # BLD AUTO: 4.43 M/UL (ref 4.6–6.2)
SODIUM SERPL-SCNC: 138 MMOL/L (ref 136–145)
TROPONIN I SERPL DL<=0.01 NG/ML-MCNC: <0.006 NG/ML (ref 0–0.03)
TROPONIN I SERPL DL<=0.01 NG/ML-MCNC: <0.006 NG/ML (ref 0–0.03)
WBC # BLD AUTO: 4.69 K/UL (ref 3.9–12.7)

## 2024-03-25 PROCEDURE — 63600175 PHARM REV CODE 636 W HCPCS: Performed by: EMERGENCY MEDICINE

## 2024-03-25 PROCEDURE — 25500020 PHARM REV CODE 255: Performed by: EMERGENCY MEDICINE

## 2024-03-25 PROCEDURE — 99285 EMERGENCY DEPT VISIT HI MDM: CPT | Mod: 25

## 2024-03-25 PROCEDURE — 96374 THER/PROPH/DIAG INJ IV PUSH: CPT | Mod: 59

## 2024-03-25 PROCEDURE — 85379 FIBRIN DEGRADATION QUANT: CPT | Performed by: EMERGENCY MEDICINE

## 2024-03-25 PROCEDURE — 84484 ASSAY OF TROPONIN QUANT: CPT | Mod: 91 | Performed by: EMERGENCY MEDICINE

## 2024-03-25 PROCEDURE — 93005 ELECTROCARDIOGRAM TRACING: CPT

## 2024-03-25 PROCEDURE — 80053 COMPREHEN METABOLIC PANEL: CPT | Performed by: EMERGENCY MEDICINE

## 2024-03-25 PROCEDURE — 85025 COMPLETE CBC W/AUTO DIFF WBC: CPT | Performed by: EMERGENCY MEDICINE

## 2024-03-25 PROCEDURE — 83880 ASSAY OF NATRIURETIC PEPTIDE: CPT | Performed by: EMERGENCY MEDICINE

## 2024-03-25 PROCEDURE — 93010 ELECTROCARDIOGRAM REPORT: CPT | Mod: ,,, | Performed by: STUDENT IN AN ORGANIZED HEALTH CARE EDUCATION/TRAINING PROGRAM

## 2024-03-25 PROCEDURE — 87389 HIV-1 AG W/HIV-1&-2 AB AG IA: CPT | Performed by: EMERGENCY MEDICINE

## 2024-03-25 PROCEDURE — 96375 TX/PRO/DX INJ NEW DRUG ADDON: CPT

## 2024-03-25 RX ORDER — RANOLAZINE 1000 MG/1
1000 TABLET, EXTENDED RELEASE ORAL 2 TIMES DAILY
Qty: 60 TABLET | Refills: 2 | Status: SHIPPED | OUTPATIENT
Start: 2024-03-25 | End: 2025-03-25

## 2024-03-25 RX ORDER — CARVEDILOL 12.5 MG/1
12.5 TABLET ORAL 2 TIMES DAILY WITH MEALS
Qty: 60 TABLET | Refills: 2 | Status: SHIPPED | OUTPATIENT
Start: 2024-03-25 | End: 2024-05-11

## 2024-03-25 RX ORDER — DIPHENHYDRAMINE HYDROCHLORIDE 50 MG/ML
50 INJECTION INTRAMUSCULAR; INTRAVENOUS
Status: COMPLETED | OUTPATIENT
Start: 2024-03-25 | End: 2024-03-25

## 2024-03-25 RX ORDER — METHYLPREDNISOLONE SOD SUCC 125 MG
125 VIAL (EA) INJECTION
Status: COMPLETED | OUTPATIENT
Start: 2024-03-25 | End: 2024-03-25

## 2024-03-25 RX ADMIN — METHYLPREDNISOLONE SODIUM SUCCINATE 125 MG: 125 INJECTION, POWDER, FOR SOLUTION INTRAMUSCULAR; INTRAVENOUS at 10:03

## 2024-03-25 RX ADMIN — DIPHENHYDRAMINE HYDROCHLORIDE 50 MG: 50 INJECTION, SOLUTION INTRAMUSCULAR; INTRAVENOUS at 10:03

## 2024-03-25 RX ADMIN — IOHEXOL 100 ML: 350 INJECTION, SOLUTION INTRAVENOUS at 11:03

## 2024-03-25 NOTE — DISCHARGE INSTRUCTIONS
Increase your Ranolazine to 1000 mg twice a day and carvedilol to 12.5 mg twice a day.    Return for any chest pain shortness breath weakness lightheadedness or for any concerns or complications at all.

## 2024-03-25 NOTE — ED PROVIDER NOTES
SCRIBE #1 NOTE: I, Pradip Avendaño/Vandana Clemens, am scribing for, and in the presence of, Fausto Galaviz MD. I have scribed the entire note.       History     Chief Complaint   Patient presents with    Chest Pain     Pt c/o chest tightness that began yesterday after working in his yard, accompanied by SOB.  The symptoms improved after resting but was worse on waking this morning.  Pt has been seen here for this complaint in the past.     Review of patient's allergies indicates:   Allergen Reactions    Lipitor [atorvastatin] Other (See Comments)     Muscle aches and pains as well as fatigue.     Niacin preparations Other (See Comments)     Causes broken blood vessels and bruises at 4 times normal dose.    Statins-hmg-coa reductase inhibitors Other (See Comments)     Statins cause intolerable myalgias.    Iodinated contrast media Hives     Tachycardia    Omeprazole Hives and Itching    Prilosec [omeprazole magnesium] Hives and Itching         History of Present Illness     HPI    3/25/2024, 8:25 AM  History obtained from the patient      History of Present Illness: Erendira Pretty is a 78 y.o. male patient with a PMHx of CAD s/p CABG, COPD, HTN, PVD, cirrhosis who presents to the Emergency Department for evaluation of chest pain which onset 1 day PTA. Pt describes the pain as a pressure. Symptoms are intermittent and moderate in severity. No mitigating or exacerbating factors reported. Associated sxs include intermittent SOB. Patient denies any fever, chills, n/v/d, weakness, numbness, dizziness, and all other sxs at this time. Patient currently follows with Dr. Gallardo (Cardiology) and is scheduled for a cardiac ablation in mid June. He has had a cardiac stress test within the past year. No further complaints or concerns at this time.     Arrival mode: Personal vehicle    PCP: Bhupinder Callaway MD        Past Medical History:  Past Medical History:   Diagnosis Date    Anticoagulant long-term use     Aortic stenosis      Asthma     Benign prostatic hyperplasia with nocturia     Bilateral carotid artery stenosis 07/21/2021    US CAROTID BILATERAL 07/14/2021 IMPRESSION: Atherosclerosis with suspected stenosis greater than 50% at the right proximal ICA visually. Velocities are discordant and appear improved from prior. Atherosclerosis on the left with no evidence of flow-limiting stenosis greater than 50%.  FINDINGS: Right: Internal Carotid Artery (ICA): Peak systolic velocity 118 cm/sec. End diastolic velocity 35 cm/sec    BPH (benign prostatic hyperplasia)     CAD (coronary artery disease)     40% lesion 2002;violet    Cirrhosis     Claudication 04/09/2014    Colon polyp     COPD (chronic obstructive pulmonary disease)     ED (erectile dysfunction)     Encounter for blood transfusion     Ex-smoker     Hearing loss NEC     Hepatitis C     Cured;reji brown 2015    Hyperlipidemia     Hypertension     Hypothyroid     s/p tx graves    Open angle with borderline findings and low glaucoma risk in both eyes 09/22/2020    DELONTE on CPAP     Osteoarthritis     Paroxysmal atrial fibrillation     PVD (peripheral vascular disease)     Secondary esophageal varices without bleeding 06/26/2017    Type 2 diabetes mellitus        Past Surgical History:  Past Surgical History:   Procedure Laterality Date    ANGIOGRAM, EXTREMITY, UNILATERAL Left 1/4/2024    Procedure: ANGIOGRAM, EXTREMITY, UNILATERAL- Femoral / SMS Stent, Poss General;  Surgeon: ALEX Alfred III, MD;  Location: [facility] OR [address] [place] [address];  Service: Vascular;  Laterality: Left;  mGy : 2698.78  Gy.cm : 229.88  Contrast : 62ml  Fluro time : 13.8min    CARDIAC CATHETERIZATION      CARDIAC SURGERY      CARPAL TUNNEL RELEASE Left     CATARACT EXTRACTION      CATARACT EXTRACTION W/ INTRAOCULAR LENS  IMPLANT, BILATERAL  2008    CATHETERIZATION OF BOTH LEFT AND RIGHT HEART N/A 11/2/2018    Procedure: CATHETERIZATION, HEART, BOTH LEFT AND RIGHT;  Surgeon: Frank Gallardo MD;  Location: [facility]  CATH LAB;  Service: Cardiology;  Laterality: N/A;    COLONOSCOPY  8/2013    COLONOSCOPY N/A 5/30/2016    Procedure: COLONOSCOPY;  Surgeon: Anna Tomas MD;  Location: [facility] ENDO;  Service: Endoscopy;  Laterality: N/A;    COLONOSCOPY N/A 7/17/2019    Procedure: COLONOSCOPY;  Surgeon: David Carter III, MD;  Location: [facility] ENDO;  Service: Endoscopy;  Laterality: N/A;    COLONOSCOPY N/A 12/14/2023    Procedure: COLONOSCOPY;  Surgeon: Ama Barrera MD;  Location: [facility] ENDO;  Service: Endoscopy;  Laterality: N/A;    COMPUTED TOMOGRAPHY N/A 9/9/2019    Procedure: CT (COMPUTED TOMOGRAPHY);  Surgeon: [facility] Diagnostic Provider;  Location: [facility] OR;  Service: General;  Laterality: N/A;  Microwave ablation to be done in CT.  Need CRNA and cart    COMPUTED TOMOGRAPHY N/A 1/25/2022    Procedure: Liver Microwave Ablation;  Surgeon: Red Puentes MD;  Location: [facility];  Service: General;  Laterality: N/A;    CORONARY ARTERY BYPASS GRAFT (CABG) N/A 11/5/2018    Procedure: CORONARY ARTERY BYPASS GRAFT (CABG);  Surgeon: Bear De Luna MD;  Location: [facility];  Service: Cardiothoracic;  Laterality: N/A;  TWO VESSEL BYPASS WITH AORTOTOMY AND EXCISION OF ATHEROSCLEROTIC PLAQUE    CORONARY BYPASS GRAFT ANGIOGRAPHY  3/2/2020    Procedure: Bypass graft study;  Surgeon: Cora Cormier MD;  Location: [facility] CATH LAB;  Service: Cardiology;;    ELBOW BURSA SURGERY Right 2010    ENDOSCOPIC HARVEST OF VEIN Left 11/5/2018    Procedure: SURGICAL PROCUREMENT, VEIN, ENDOSCOPIC;  Surgeon: Bear De Luna MD;  Location: [facility] OR;  Service: Cardiothoracic;  Laterality: Left;    ESOPHAGOGASTRODUODENOSCOPY N/A 7/17/2019    Procedure: ESOPHAGOGASTRODUODENOSCOPY (EGD);  Surgeon: David Carter III, MD;  Location: [facility];  Service: Endoscopy;  Laterality: N/A;    ESOPHAGOGASTRODUODENOSCOPY N/A 12/29/2022    Procedure: ESOPHAGOGASTRODUODENOSCOPY (EGD);  Surgeon: Issa Gayle MD;  Location: [facility];  Service: Endoscopy;   Laterality: N/A;    ESOPHAGOGASTRODUODENOSCOPY N/A 1/17/2024    Procedure: EGD (ESOPHAGOGASTRODUODENOSCOPY);  Surgeon: Issa Gayle MD;  Location: [facility];  Service: Endoscopy;  Laterality: N/A;    ETHMOIDECTOMY Bilateral 3/27/2019    Procedure: ETHMOIDECTOMY;  Surgeon: Alejandro Parkinson MD;  Location: [facility] OR;  Service: ENT;  Laterality: Bilateral;    EYE SURGERY      FOOT SURGERY      FRONTAL SINUS OBLITERATION Bilateral 3/27/2019    Procedure: SINUSOTOMY, FRONTAL SINUS, OBLITERATIVE;  Surgeon: Alejandro Parkinson MD;  Location: [facility] OR;  Service: ENT;  Laterality: Bilateral;    FUNCTIONAL ENDOSCOPIC SINUS SURGERY (FESS) Bilateral 3/27/2019    Procedure: FESS (FUNCTIONAL ENDOSCOPIC SINUS SURGERY);  Surgeon: Alejandro Parkinson MD;  Location: [facility] OR;  Service: ENT;  Laterality: Bilateral;  Left Keila bullosa resection     INTRALUMINAL GASTROINTESTINAL TRACT IMAGING VIA CAPSULE N/A 2/2/2024    Procedure: IMAGING PROCEDURE, GI TRACT, INTRALUMINAL, VIA CAPSULE;  Surgeon: Cooper Estrella RN;  Location: [facility];  Service: Endoscopy;  Laterality: N/A;    KNEE ARTHROSCOPY W/ MENISCAL REPAIR Left 06/2019    dr boykin    KNEE SURGERY Right     LEFT HEART CATHETERIZATION Left 3/2/2020    Procedure: CATHETERIZATION, HEART, LEFT;  Surgeon: Cora Cormier MD;  Location: [facility] CATH LAB;  Service: Cardiology;  Laterality: Left;    LIVER BIOPSY  01/2022    MAXILLARY ANTROSTOMY Bilateral 3/27/2019    Procedure: MAXILLARY ANTROSTOMY;  Surgeon: Alejandro Parkinson MD;  Location: [facility] OR;  Service: ENT;  Laterality: Bilateral;    NASAL SEPTOPLASTY N/A 3/27/2019    Procedure: SEPTOPLASTY, NOSE;  Surgeon: Alejandro Parkinson MD;  Location: [facility] OR;  Service: ENT;  Laterality: N/A;    RECTAL SURGERY  2012    STENT, SUPERIOR MESENTERIC ARTERY Left 1/4/2024    Procedure: STENT, SUPERIOR MESENTERIC ARTERY;  Surgeon: ALEX Alfred III, MD;  Location: [address];  Service: Vascular;  Laterality: Left;    TRANSURETHRAL RESECTION OF PROSTATE (TURP) WITHOUT USE OF  LASER N/A 2018    Procedure: TURP, WITHOUT USING LASER;  Surgeon: Cooper Cordova IV, MD;  Location: [facility];  Service: Urology;  Laterality: N/A;    TRIGGER FINGER RELEASE Left          Family History:  Family History   Problem Relation Age of Onset    Heart disease Mother     Heart disease Father     Heart disease Brother     Colon cancer Neg Hx        Social History:  Social History     Tobacco Use    Smoking status: Former     Current packs/day: 0.00     Average packs/day: 0.5 packs/day for 4.0 years (2.0 ttl pk-yrs)     Types: Cigarettes     Start date: 1968     Quit date: 1972     Years since quittin.8    Smokeless tobacco: Former     Types: Chew     Quit date: 1976   Substance and Sexual Activity    Alcohol use: Yes     Alcohol/week: 2.0 standard drinks of alcohol     Types: 2 Cans of beer per week    Drug use: No    Sexual activity: Yes     Partners: Female        Review of Systems     Review of Systems   Constitutional:  Negative for chills and fever.   HENT:  Negative for sore throat.    Respiratory:  Positive for shortness of breath (intermittent).    Cardiovascular:  Positive for chest pain (intermittent pressure).   Gastrointestinal:  Negative for diarrhea, nausea and vomiting.   Genitourinary:  Negative for dysuria.   Musculoskeletal:  Negative for back pain.   Skin:  Negative for rash.   Neurological:  Negative for dizziness, weakness, numbness and headaches.   Hematological:  Does not bruise/bleed easily.   All other systems reviewed and are negative.       Physical Exam     Initial Vitals [24 0755]   BP Pulse Resp Temp SpO2   137/67 75 16 98.2 °F (36.8 °C) 100 %      MAP       --          Physical Exam  Nursing Notes and Vital Signs Reviewed.  Constitutional: Patient is in no acute distress. Well-developed and well-nourished.  Head: Atraumatic. Normocephalic.  Eyes: PERRL. EOM intact. Conjunctivae are not pale. No scleral icterus.  ENT: Mucous membranes are moist.  Oropharynx is clear and symmetric.    Neck: Supple. Full ROM. No lymphadenopathy.  Cardiovascular: Regular rate. Regular rhythm. No murmurs, rubs, or gallops. Distal pulses are 2+ and symmetric.  Pulmonary/Chest: No respiratory distress. Clear to auscultation bilaterally. No wheezing or rales.  Abdominal: Soft and non-distended.  There is no tenderness.  No rebound, guarding, or rigidity. Good bowel sounds.  Genitourinary: No CVA tenderness  Musculoskeletal: Moves all extremities. No obvious deformities. No edema. No calf tenderness.  Skin: Warm and dry.  Neurological:  Alert, awake, and appropriate.  Normal speech.  No acute focal neurological deficits are appreciated.  Psychiatric: Normal affect. Good eye contact. Appropriate in content.     ED Course   Procedures  ED Vital Signs:  Vitals:    03/25/24 0755 03/25/24 0810 03/25/24 0900 03/25/24 0945   BP: 137/67 (!) 122/58 (!) 105/57 (!) 115/56   Pulse: 75 80 72 69   Resp: 16 12 17 15   Temp: 98.2 °F (36.8 °C)      TempSrc: Oral      SpO2: 100% 99% 97% 97%   Weight: 100 kg (220 lb 7.4 oz)       03/25/24 1000 03/25/24 1045 03/25/24 1100 03/25/24 1115   BP: (!) 107/59 (!) 145/70 (!) 140/62 124/60   Pulse: 71 72 73 71   Resp: 18 16 12 13   Temp:       TempSrc:       SpO2: 99% 100% 97% 97%   Weight:        03/25/24 1230 03/25/24 1403   BP: 136/68 (!) 148/70   Pulse: 79 84   Resp: 14 (!) 22   Temp:     TempSrc:     SpO2: 96% 99%   Weight:         Abnormal Lab Results:  Labs Reviewed   CBC W/ AUTO DIFFERENTIAL - Abnormal; Notable for the following components:       Result Value    RBC 4.43 (*)     Hemoglobin 10.8 (*)     Hematocrit 35.3 (*)     MCV 80 (*)     MCH 24.4 (*)     MCHC 30.6 (*)     RDW 22.1 (*)     Immature Granulocytes 0.6 (*)     Lymph # 0.9 (*)     All other components within normal limits    Narrative:     Release to patient->Immediate   COMPREHENSIVE METABOLIC PANEL - Abnormal; Notable for the following components:    Glucose 118 (*)     All other  components within normal limits    Narrative:     Release to patient->Immediate   B-TYPE NATRIURETIC PEPTIDE - Abnormal; Notable for the following components:     (*)     All other components within normal limits    Narrative:     Release to patient->Immediate   D DIMER, QUANTITATIVE - Abnormal; Notable for the following components:    D-Dimer 1.15 (*)     All other components within normal limits    Narrative:     Release to patient->Immediate   HIV 1 / 2 ANTIBODY    Narrative:     Release to patient->Immediate   TROPONIN I    Narrative:     Release to patient->Immediate   TROPONIN I        All Lab Results:  Results for orders placed or performed during the hospital encounter of 03/25/24   HIV 1/2 Ag/Ab (4th Gen)   Result Value Ref Range    HIV 1/2 Ag/Ab Negative Negative   CBC auto differential   Result Value Ref Range    WBC 4.69 3.90 - 12.70 K/uL    RBC 4.43 (L) 4.60 - 6.20 M/uL    Hemoglobin 10.8 (L) 14.0 - 18.0 g/dL    Hematocrit 35.3 (L) 40.0 - 54.0 %    MCV 80 (L) 82 - 98 fL    MCH 24.4 (L) 27.0 - 31.0 pg    MCHC 30.6 (L) 32.0 - 36.0 g/dL    RDW 22.1 (H) 11.5 - 14.5 %    Platelets 197 150 - 450 K/uL    MPV 9.3 9.2 - 12.9 fL    Immature Granulocytes 0.6 (H) 0.0 - 0.5 %    Gran # (ANC) 3.1 1.8 - 7.7 K/uL    Immature Grans (Abs) 0.03 0.00 - 0.04 K/uL    Lymph # 0.9 (L) 1.0 - 4.8 K/uL    Mono # 0.5 0.3 - 1.0 K/uL    Eos # 0.1 0.0 - 0.5 K/uL    Baso # 0.02 0.00 - 0.20 K/uL    nRBC 0 0 /100 WBC    Gran % 65.8 38.0 - 73.0 %    Lymph % 19.8 18.0 - 48.0 %    Mono % 11.1 4.0 - 15.0 %    Eosinophil % 2.3 0.0 - 8.0 %    Basophil % 0.4 0.0 - 1.9 %    Platelet Estimate Appears normal     Aniso Slight     Tear Drop Cells Occasional     Differential Method Automated    Comprehensive metabolic panel   Result Value Ref Range    Sodium 138 136 - 145 mmol/L    Potassium 4.1 3.5 - 5.1 mmol/L    Chloride 102 95 - 110 mmol/L    CO2 25 23 - 29 mmol/L    Glucose 118 (H) 70 - 110 mg/dL    BUN 18 8 - 23 mg/dL    Creatinine 1.1  0.5 - 1.4 mg/dL    Calcium 9.0 8.7 - 10.5 mg/dL    Total Protein 7.4 6.0 - 8.4 g/dL    Albumin 3.8 3.5 - 5.2 g/dL    Total Bilirubin 0.4 0.1 - 1.0 mg/dL    Alkaline Phosphatase 69 55 - 135 U/L    AST 20 10 - 40 U/L    ALT 17 10 - 44 U/L    eGFR >60 >60 mL/min/1.73 m^2    Anion Gap 11 8 - 16 mmol/L   Troponin I #1   Result Value Ref Range    Troponin I <0.006 0.000 - 0.026 ng/mL   BNP   Result Value Ref Range     (H) 0 - 99 pg/mL   D dimer, quantitative   Result Value Ref Range    D-Dimer 1.15 (H) <0.50 mg/L FEU   Troponin I   Result Value Ref Range    Troponin I <0.006 0.000 - 0.026 ng/mL   EKG 12-lead (Chest Pain) Age >30   Result Value Ref Range    QRS Duration 86 ms    [facility] QTC Calculation 435 ms     *Note: Due to a large number of results and/or encounters for the requested time period, some results have not been displayed. A complete set of results can be found in Results Review.     Imaging Results:  Imaging Results              CTA Chest Non-Coronary (PE Studies) (Final result)  Result time 03/25/24 12:01:43      Final result by Red Puentes MD (03/25/24 12:01:43)                   Impression:      No evidence of pulmonary embolism.    Cardiomegaly.  Calcifications aortic valve with suspected aortic valve stenosis. Recommend follow-up cardiac echo.    See additional findings above    All CT scans at this facility use dose modulation, iterative reconstruction and/or weight based dosing when appropriate to reduce radiation dose to as low as reasonably achievable.      Electronically signed by: Red Puentes MD  Date:    03/25/2024  Time:    12:01               Narrative:    EXAMINATION:  CTA CHEST NON CORONARY (PE STUDIES)    CLINICAL HISTORY:  Chest pain and shortness of breath    TECHNIQUE:  After the intravenous administration of 100 cc of Omni 350 nonionic contrast using CT pulmonary angio technique, 1.25  Mm axial images were acquired using helical CT technique from the lung apices through  costophrenic sulci.  Sagittal coronal and oblique MIPS were also submitted for interpretation.    COMPARISON:  Chest x-ray dated 03/25/2024    FINDINGS:  -Pulmonary arteries: Pulmonary arteries are well opacified.  No evidence of pulmonary embolism.  No evidence of pulmonary hypertension.  No right heart strain is identified with RV/LV ratio < 0.9.    -Lungs: No nodules or infiltrates.  Mild dependent changes.  No consolidation.    -Pleura: No thickening or fluid.    -Mediastinum/Joan:Shotty adenopathy    -Axilla: No adenopathy.    -Thyroid: Normal lower gland.    -Heart/Aorta: Cardiomegaly.  Moderate to severe coronary artery disease.  No pericardial effusion. Aorta normal caliber.   Aorta demonstrates moderate atherosclerotic disease.  Calcifications aortic valve with suspected aortic valve stenosis.  Recommend follow-up cardiac echo.    -Bones/Chest Wall: Intact    -Upper Abdomen: Benign liver cyst left hepatic lobe measuring 4.2 x 3.7 cm with peripheral calcification.  17 mm hypodensity right hepatic lobe.  Evaluation for liver mass limited with phase of contrast.  Mild constipation.  Mild renal cortical thinning and nonspecific perinephric stranding.                                       X-Ray Chest AP Portable (Final result)  Result time 03/25/24 09:04:28      Final result by Red Puentes MD (03/25/24 09:04:28)                   Impression:      No acute process seen.      Electronically signed by: Red Puentes MD  Date:    03/25/2024  Time:    09:04               Narrative:    EXAMINATION:  XR CHEST AP PORTABLE    CLINICAL HISTORY:  Chest Pain;    FINDINGS:  Single view of the chest.  Comparison 02/23/2024.    Cardiac silhouette is normal.  The lungs demonstrate no evidence of active disease.  No evidence of pleural effusion or pneumothorax.  Bones appear intact.  Moderate degenerative changes and moderate atherosclerotic disease.                                       The EKG was ordered, reviewed, and  independently interpreted by the ED provider.  Interpretation time: 07:53   Rate: 69 BPM  Rhythm: undetermined rhythm  Interpretation: No acute ST changes. No STEMI.           The Emergency Provider reviewed the vital signs and test results, which are outlined above.     ED Discussion     1:01 PM: Discussed pt's case with Dr. Serna (Cardiology) who recommends increasing the pt's Ranexa to 1,000 mg BID, as well as increasing his Coreg to 12.5 mg BID. Per Dr. Serna, pt is not a candidate for heart catheterization at this time due to anemia rectal bleeding and patient will not be able to tolerate dual antiplatelet therapy    2:05 PM: Reassessed pt at this time. Discussed with pt all pertinent ED information and results. Discussed pt dx and plan of tx. Gave pt all f/u and return to the ED instructions. All questions and concerns were addressed at this time. Pt expresses understanding of information and instructions, and is comfortable with plan to discharge. Pt is stable for discharge.    I discussed with patient and/or family/caretaker that evaluation in the ED does not suggest any emergent or life threatening medical conditions requiring immediate intervention beyond what was provided in the ED, and I believe patient is safe for discharge.  Regardless, an unremarkable evaluation in the ED does not preclude the development or presence of a serious of life threatening condition. As such, patient was instructed to return immediately for any worsening or change in current symptoms.    ED Course as of 03/25/24 1604   Mon Mar 25, 2024   1351 Patient with normal white blood cell count, hemoglobin 10.8, CMP with mildly elevated glucose at 1:18 a.m., 2 troponins within normal range, BNP a little elevated 135, D-dimer was elevated 1.15. [TD]      ED Course User Index  [TD] Fausto Galaviz MD     Medical Decision Making  Patient with no chest pain in the emergency room.  He does have cardiac history and extensive history.   He had a 2 troponins within normal range and CTA which did not show any acute pathology.  He was stable at discharge.  Prescriptions given to increase his Ranexa and Coreg medications.    Amount and/or Complexity of Data Reviewed  Labs: ordered. Decision-making details documented in ED Course.  Radiology: ordered. Decision-making details documented in ED Course.  ECG/medicine tests: ordered and independent interpretation performed. Decision-making details documented in ED Course.  Discussion of management or test interpretation with external provider(s): Differential diagnoses: Myocardial infarction, angina, pneumonia, asthma, infection, pneumothorax, pericarditis , pleural effusion, GERD, pancreatitis, gallstone pain, cholelithiasis or cholecystitis, esophageal rupture, bowel perforation, gastritis, nonspecific chest pain, musculoskeletal chest pain, costochondritis,     Risk  Prescription drug management.                ED Medication(s):  Medications   methylPREDNISolone sodium succinate injection 125 mg (125 mg Intravenous Given 3/25/24 1021)   diphenhydrAMINE injection 50 mg (50 mg Intravenous Given 3/25/24 1020)   iohexoL (OMNIPAQUE 350) injection 100 mL (100 mLs Intravenous Given 3/25/24 1150)       Discharge Medication List as of 3/25/2024  1:56 PM           Follow-up Information       Bhupinder Callaway MD. Schedule an appointment as soon as possible for a visit in 2 days.    Specialties: Internal Medicine, Pediatrics  Contact information:  [address]  [place] [address] [phone]               Frank Gallardo MD. Schedule an appointment as soon as possible for a visit in 2 days.    Specialties: Interventional Cardiology, Cardiology  Contact information:  [address] [place] [address] [address] [place] [address] [phone]                                 Scribe Attestation:   Scribe #1: I performed the above scribed service and the documentation accurately describes the services I performed. I  attest to the accuracy of the note.     Attending:   Physician Attestation Statement for Scribe #1: I, Fausto Galaviz MD, personally performed the services described in this documentation, as scribed by Pradip Avendaño/Vandana Clemens, in my presence, and it is both accurate and complete.           Clinical Impression       ICD-10-CM ICD-9-CM   1. Chest pain, unspecified type  R07.9 786.50   2. Chest pain  R07.9 786.50       Disposition:   Disposition: Discharged  Condition: Stable         Fausto Galaviz MD  03/25/24 1605

## 2024-03-26 ENCOUNTER — INFUSION (OUTPATIENT)
Dept: INFUSION THERAPY | Facility: HOSPITAL | Age: 79
End: 2024-03-26
Attending: NURSE PRACTITIONER
Payer: MEDICARE

## 2024-03-26 ENCOUNTER — OFFICE VISIT (OUTPATIENT)
Dept: INTERNAL MEDICINE | Facility: CLINIC | Age: 79
End: 2024-03-26
Payer: MEDICARE

## 2024-03-26 VITALS
RESPIRATION RATE: 18 BRPM | SYSTOLIC BLOOD PRESSURE: 118 MMHG | OXYGEN SATURATION: 99 % | BODY MASS INDEX: 34.33 KG/M2 | HEIGHT: 67 IN | TEMPERATURE: 97 F | BODY MASS INDEX: 34.29 KG/M2 | HEART RATE: 83 BPM | HEART RATE: 74 BPM | DIASTOLIC BLOOD PRESSURE: 70 MMHG | OXYGEN SATURATION: 97 % | TEMPERATURE: 98 F | DIASTOLIC BLOOD PRESSURE: 68 MMHG | WEIGHT: 218.5 LBS | SYSTOLIC BLOOD PRESSURE: 120 MMHG | HEIGHT: 67 IN | RESPIRATION RATE: 16 BRPM | WEIGHT: 218.69 LBS

## 2024-03-26 DIAGNOSIS — N40.1 BENIGN PROSTATIC HYPERPLASIA WITH URINARY HESITANCY: ICD-10-CM

## 2024-03-26 DIAGNOSIS — R39.11 BENIGN PROSTATIC HYPERPLASIA WITH URINARY HESITANCY: ICD-10-CM

## 2024-03-26 DIAGNOSIS — D50.0 IRON DEFICIENCY ANEMIA DUE TO CHRONIC BLOOD LOSS: Primary | ICD-10-CM

## 2024-03-26 DIAGNOSIS — R07.9 CHEST PAIN, MODERATE CORONARY ARTERY RISK: Primary | ICD-10-CM

## 2024-03-26 LAB
OHS QRS DURATION: 86 MS
OHS QTC CALCULATION: 435 MS

## 2024-03-26 PROCEDURE — 1126F AMNT PAIN NOTED NONE PRSNT: CPT | Mod: CPTII,S$GLB,, | Performed by: PEDIATRICS

## 2024-03-26 PROCEDURE — 3288F FALL RISK ASSESSMENT DOCD: CPT | Mod: CPTII,S$GLB,, | Performed by: PEDIATRICS

## 2024-03-26 PROCEDURE — 3074F SYST BP LT 130 MM HG: CPT | Mod: CPTII,S$GLB,, | Performed by: PEDIATRICS

## 2024-03-26 PROCEDURE — 1157F ADVNC CARE PLAN IN RCRD: CPT | Mod: CPTII,S$GLB,, | Performed by: PEDIATRICS

## 2024-03-26 PROCEDURE — 25000003 PHARM REV CODE 250: Performed by: NURSE PRACTITIONER

## 2024-03-26 PROCEDURE — 3072F LOW RISK FOR RETINOPATHY: CPT | Mod: CPTII,S$GLB,, | Performed by: PEDIATRICS

## 2024-03-26 PROCEDURE — 99214 OFFICE O/P EST MOD 30 MIN: CPT | Mod: S$GLB,,, | Performed by: PEDIATRICS

## 2024-03-26 PROCEDURE — 99999 PR PBB SHADOW E&M-EST. PATIENT-LVL V: CPT | Mod: PBBFAC,,, | Performed by: PEDIATRICS

## 2024-03-26 PROCEDURE — 63600175 PHARM REV CODE 636 W HCPCS: Performed by: NURSE PRACTITIONER

## 2024-03-26 PROCEDURE — 96365 THER/PROPH/DIAG IV INF INIT: CPT

## 2024-03-26 PROCEDURE — 1160F RVW MEDS BY RX/DR IN RCRD: CPT | Mod: CPTII,S$GLB,, | Performed by: PEDIATRICS

## 2024-03-26 PROCEDURE — 3078F DIAST BP <80 MM HG: CPT | Mod: CPTII,S$GLB,, | Performed by: PEDIATRICS

## 2024-03-26 PROCEDURE — 1101F PT FALLS ASSESS-DOCD LE1/YR: CPT | Mod: CPTII,S$GLB,, | Performed by: PEDIATRICS

## 2024-03-26 PROCEDURE — 1159F MED LIST DOCD IN RCRD: CPT | Mod: CPTII,S$GLB,, | Performed by: PEDIATRICS

## 2024-03-26 PROCEDURE — 96375 TX/PRO/DX INJ NEW DRUG ADDON: CPT

## 2024-03-26 RX ORDER — SODIUM CHLORIDE 0.9 % (FLUSH) 0.9 %
10 SYRINGE (ML) INJECTION
Status: DISCONTINUED | OUTPATIENT
Start: 2024-03-26 | End: 2024-03-26 | Stop reason: HOSPADM

## 2024-03-26 RX ORDER — EPINEPHRINE 0.3 MG/.3ML
0.3 INJECTION SUBCUTANEOUS ONCE AS NEEDED
Status: CANCELLED | OUTPATIENT
Start: 2024-03-26

## 2024-03-26 RX ORDER — SODIUM CHLORIDE 0.9 % (FLUSH) 0.9 %
10 SYRINGE (ML) INJECTION
Status: CANCELLED | OUTPATIENT
Start: 2024-03-26

## 2024-03-26 RX ORDER — DIPHENHYDRAMINE HYDROCHLORIDE 50 MG/ML
50 INJECTION INTRAMUSCULAR; INTRAVENOUS ONCE AS NEEDED
Status: CANCELLED | OUTPATIENT
Start: 2024-03-26

## 2024-03-26 RX ORDER — METHYLPREDNISOLONE SOD SUCC 125 MG
60 VIAL (EA) INJECTION
Status: COMPLETED | OUTPATIENT
Start: 2024-03-26 | End: 2024-03-26

## 2024-03-26 RX ORDER — METHYLPREDNISOLONE SOD SUCC 125 MG
60 VIAL (EA) INJECTION
Status: CANCELLED
Start: 2024-03-26

## 2024-03-26 RX ADMIN — FERUMOXYTOL 510 MG: 510 INJECTION INTRAVENOUS at 07:03

## 2024-03-26 RX ADMIN — METHYLPREDNISOLONE SODIUM SUCCINATE 60 MG: 125 INJECTION, POWDER, FOR SOLUTION INTRAMUSCULAR; INTRAVENOUS at 07:03

## 2024-03-26 NOTE — NURSING
Infusion# 2 of 2    Recent labs? Reviewed    Premeds? Solumedrol 60 mg IVP    S/S of current or recent infections in the past 14 days? None/Denies    Recent Surgery/invasive procedures? None/Denies     Any recent vaccines ? None/Denies    Feraheme 510 mg administered IV at a 17 minute rate per orders; see MAR and vitals for more  Details.

## 2024-03-26 NOTE — PROGRESS NOTES
Subjective     Patient ID: Erendira Pretty is a 78 y.o. male.    Chief Complaint: Follow-up    Erendira Pretty is a 78 year old male here for a hospital follow up. Pt was seen at [facility] ED yesterday for chest pain and intermittent SOB. Pt states he woke up yesterday with the pain. Pt had the same pain 3 days ago but it was brought on by exercise. Pt was discharged yesterday with instructions to increase his dose on ranexa. Pt requests referral to urology for worsening urinary issues. Previously severe headaches with nitroglycerin compounds.      Review of Systems   Constitutional:  Negative for chills and fever.   HENT:  Negative for nasal congestion and rhinorrhea.    Respiratory:  Negative for cough and shortness of breath.    Cardiovascular:  Negative for chest pain.   Gastrointestinal:  Negative for abdominal pain, blood in stool, change in bowel habit, constipation, diarrhea and nausea.   Endocrine: Negative for cold intolerance, heat intolerance, polydipsia, polyphagia and polyuria.   Genitourinary:  Positive for difficulty urinating. Negative for dysuria.   Neurological:  Negative for weakness.          Objective     Physical Exam  Constitutional:       General: He is not in acute distress.     Appearance: Normal appearance. He is normal weight. He is not ill-appearing, toxic-appearing or diaphoretic.   HENT:      Head: Atraumatic.   Cardiovascular:      Rate and Rhythm: Normal rate and regular rhythm.      Pulses: Normal pulses.      Heart sounds: Murmur (grade 2 at left sternal border) heard.   Pulmonary:      Effort: Pulmonary effort is normal. No respiratory distress.      Breath sounds: Normal breath sounds. No stridor. No wheezing, rhonchi or rales.   Chest:      Chest wall: No tenderness.   Abdominal:      General: Abdomen is flat. Bowel sounds are normal. There is no distension.      Palpations: Abdomen is soft. There is no mass.      Tenderness: There is no abdominal tenderness. There is no guarding.    Neurological:      General: No focal deficit present.      Mental Status: He is alert and oriented to person, place, and time. Mental status is at baseline.      Cranial Nerves: No cranial nerve deficit.      Sensory: No sensory deficit.      Motor: No weakness.      Gait: Gait normal.   Psychiatric:         Mood and Affect: Mood normal.         Behavior: Behavior normal.            Assessment and Plan     1. Chest pain, moderate coronary artery risk  -     Ambulatory referral/consult to Cardiology; Future; Expected date: 04/02/2024    2. Benign prostatic hyperplasia with urinary hesitancy  -     Ambulatory referral/consult to Urology; Future; Expected date: 04/02/2024        Referral to Cardiology placed to reevaluate how pt is doing on increased ranexa dose. Referral to urology. Keep follow up next month.         No follow-ups on file.

## 2024-03-27 ENCOUNTER — OFFICE VISIT (OUTPATIENT)
Dept: SURGERY | Facility: CLINIC | Age: 79
End: 2024-03-27
Payer: MEDICARE

## 2024-03-27 VITALS
BODY MASS INDEX: 34.55 KG/M2 | TEMPERATURE: 98 F | HEIGHT: 67 IN | WEIGHT: 220.13 LBS | OXYGEN SATURATION: 99 % | HEART RATE: 68 BPM | DIASTOLIC BLOOD PRESSURE: 77 MMHG | SYSTOLIC BLOOD PRESSURE: 129 MMHG

## 2024-03-27 DIAGNOSIS — K55.1 MESENTERIC ARTERY STENOSIS: ICD-10-CM

## 2024-03-27 DIAGNOSIS — I48.0 PAROXYSMAL ATRIAL FIBRILLATION: ICD-10-CM

## 2024-03-27 DIAGNOSIS — Z87.19 H/O: GI BLEED: Primary | ICD-10-CM

## 2024-03-27 PROCEDURE — 1159F MED LIST DOCD IN RCRD: CPT | Mod: CPTII,S$GLB,, | Performed by: STUDENT IN AN ORGANIZED HEALTH CARE EDUCATION/TRAINING PROGRAM

## 2024-03-27 PROCEDURE — 3288F FALL RISK ASSESSMENT DOCD: CPT | Mod: CPTII,S$GLB,, | Performed by: STUDENT IN AN ORGANIZED HEALTH CARE EDUCATION/TRAINING PROGRAM

## 2024-03-27 PROCEDURE — 3074F SYST BP LT 130 MM HG: CPT | Mod: CPTII,S$GLB,, | Performed by: STUDENT IN AN ORGANIZED HEALTH CARE EDUCATION/TRAINING PROGRAM

## 2024-03-27 PROCEDURE — 1126F AMNT PAIN NOTED NONE PRSNT: CPT | Mod: CPTII,S$GLB,, | Performed by: STUDENT IN AN ORGANIZED HEALTH CARE EDUCATION/TRAINING PROGRAM

## 2024-03-27 PROCEDURE — 3072F LOW RISK FOR RETINOPATHY: CPT | Mod: CPTII,S$GLB,, | Performed by: STUDENT IN AN ORGANIZED HEALTH CARE EDUCATION/TRAINING PROGRAM

## 2024-03-27 PROCEDURE — 1157F ADVNC CARE PLAN IN RCRD: CPT | Mod: CPTII,S$GLB,, | Performed by: STUDENT IN AN ORGANIZED HEALTH CARE EDUCATION/TRAINING PROGRAM

## 2024-03-27 PROCEDURE — 1101F PT FALLS ASSESS-DOCD LE1/YR: CPT | Mod: CPTII,S$GLB,, | Performed by: STUDENT IN AN ORGANIZED HEALTH CARE EDUCATION/TRAINING PROGRAM

## 2024-03-27 PROCEDURE — 99999 PR PBB SHADOW E&M-EST. PATIENT-LVL IV: CPT | Mod: PBBFAC,,, | Performed by: STUDENT IN AN ORGANIZED HEALTH CARE EDUCATION/TRAINING PROGRAM

## 2024-03-27 PROCEDURE — 3078F DIAST BP <80 MM HG: CPT | Mod: CPTII,S$GLB,, | Performed by: STUDENT IN AN ORGANIZED HEALTH CARE EDUCATION/TRAINING PROGRAM

## 2024-03-27 PROCEDURE — 99205 OFFICE O/P NEW HI 60 MIN: CPT | Mod: S$GLB,,, | Performed by: STUDENT IN AN ORGANIZED HEALTH CARE EDUCATION/TRAINING PROGRAM

## 2024-03-27 NOTE — PROGRESS NOTES
Subjective:       Patient ID: Erendira Pretty is a 78 y.o. male.    Chief Complaint: No chief complaint on file.    Patient is a 78-year-old male with complex medical history.  He has a history of atrial fibrillation for which he was taking Xarelto.  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  He was then started on Plavix and aspirin as well as his Xarelto.  He was admitted to the hospital with GI bleeding.  At that time imaging, upper endoscopy and PillCam endoscopy were unable to identify source of the bleed.  Prior to this episode of bleeding and his SMA stenting he underwent a colonoscopy which revealed ischemic ulcerations throughout the right colon, secondary to SMA stenosis.  Since his discharge after lower GI bleed he has only been on aspirin.  He has had no further episodes of bleeding.  He however is very concerned about his risk embolic event secondary to atrial fibrillation as well as SMA stent occlusion.  His cardiologist has not restarted Xarelto because of his bleeding event and is requesting guidance.  He has plans to undergo a 2 stage ablation with Watchman device which would require therapeutic anticoagulation.  This procedure will occur in June 2024.         Colonoscopy: 12/2023  Findings:       The terminal ileum appeared normal.        Discontinuous areas of nonbleeding ulcerated mucosa with stigmata of        recent bleeding were present in the cecum. Biopsies were taken with        a cold forceps for histology.        A single (solitary) six mm ulcer was found in the ascending colon.        No bleeding was present. Stigmata of recent bleeding were present.        Biopsies were taken with a cold forceps for histology.        A single (solitary) two mm ulcer was found in the transverse colon.        No bleeding was present. No stigmata of recent bleeding were seen.        Biopsies were taken with a cold forceps for histology.        A 1 mm polyp was found in  the transverse colon. The polyp was        sessile. The polyp was removed with a jumbo cold forceps. Resection        and retrieval were complete.        A 1 mm polyp was found in the sigmoid colon. The polyp was sessile.        The polyp was removed with a jumbo cold forceps. Resection and        retrieval were complete.        The exam was otherwise without abnormality.        Hemorrhoids were found on perianal exam.     Pathology:    Diagnosis 1. COLON, CECUM, BIOPSY:  - Colonic mucosa with focal acute inflammation, lamina propria hyalinization, and focally degenerative/withered appearing crypts  - Detached inflammatory debris, consistent with adjacent ulcer bed  - No evidence of inflammatory lamina propria expansion or granuloma formation  - No evidence of dysplasia or malignancy  - See comment    2. COLON, ASCENDING, BIOPSY:  - Colonic mucosa with acute inflammation, lamina propria hyalinization, and focally degenerative/withered appearing crypts  - No evidence of inflammatory lamina propria expansion or granuloma formation  - No evidence of dysplasia or malignancy  - See comment    3. COLON, TRANSVERSE, BIOPSY:  - Colonic mucosa with reactive epithelial changes  - No evidence of dysplasia or malignancy  - See comment    Comment:  The above histologic changes (more notable in parts 1 and 2) are nonspecific but would be compatible with ischemic mucosal changes.           Objective:      Physical Exam  Constitutional:       Appearance: He is well-developed.   HENT:      Head: Normocephalic and atraumatic.   Eyes:      Conjunctiva/sclera: Conjunctivae normal.      Pupils: Pupils are equal, round, and reactive to light.   Neck:      Thyroid: No thyromegaly.   Cardiovascular:      Rate and Rhythm: Normal rate and regular rhythm.   Pulmonary:      Effort: Pulmonary effort is normal. No respiratory distress.   Abdominal:      General: There is no distension.      Palpations: Abdomen is soft. There is no mass.       Tenderness: There is no abdominal tenderness.   Musculoskeletal:         General: No tenderness. Normal range of motion.      Cervical back: Normal range of motion.   Skin:     General: Skin is warm and dry.      Capillary Refill: Capillary refill takes less than 2 seconds.   Neurological:      General: No focal deficit present.      Mental Status: He is alert and oriented to person, place, and time.         Assessment:       1. H/O: GI bleed    2. Paroxysmal atrial fibrillation    3. Mesenteric artery stenosis        Plan:       - conversation with the patient regarding risks of anticoagulation.  His cardiologist wants him to be anticoagulated however he had recent GI bleeding with an unknown source.  Also he has an SMA stent which initially required dual antiplatelet therapy.  - I suspect the source of his bleeding to be from his ischemic ulcers in his colon.  Colonoscopy was not done at the time of his acute GI bleed.  It was done before this episode.  Now that his superior mesenteric artery has been stented ideally these ulcers are healing.  - I believe that his risk of stent thrombosis or embolic events from atrial fibrillation are higher than his risk with GI bleeding.  His GI bleed was never life threatening or rapid.  It did require transfusion and hospitalization.  He understands that this has a risk with adding anticoagulation.  However I think the risks of a stroke associated with his AFib or mesenteric thrombosis.ischemia far outweigh his risk of bleeding  - I discussed this case with his cardiologist Dr. Gallardo as well as his interventional cardiologist Dr. Huerta.  According to Interventional Cardiology patient does no longer need dual antiplatelet treatment an aspirin alone is sufficient.  - per their recommendations agree to start Xarelto and continue on aspirin.  Patient will not restart Plavix.  - RTC p.r.n.    >60 minutes were spent on chart review as well as face to face discussion with the  patient including education on pathophysiology of disease and treatment recommendations            Sarah Espana MD  Colon and Rectal Surgery  [facility]

## 2024-03-30 DIAGNOSIS — E11.22 TYPE 2 DIABETES MELLITUS WITH CHRONIC KIDNEY DISEASE, WITH LONG-TERM CURRENT USE OF INSULIN, UNSPECIFIED CKD STAGE: Chronic | ICD-10-CM

## 2024-03-30 DIAGNOSIS — Z79.4 TYPE 2 DIABETES MELLITUS WITH CHRONIC KIDNEY DISEASE, WITH LONG-TERM CURRENT USE OF INSULIN, UNSPECIFIED CKD STAGE: Chronic | ICD-10-CM

## 2024-03-30 NOTE — TELEPHONE ENCOUNTER
Care Due:                  Date            Visit Type   Department     Provider  --------------------------------------------------------------------------------                                EP -                              PRIMARY      [facility] INTERNAL  Last Visit: 03-      CARE ([facility])   [facility]       Bhupinder Callaway                              EP -                              PRIMARY      [facility] INTERNAL  Next Visit: 04-      CARE ([facility])   [facility] [place]                                                            Last  Test          Frequency    Reason                     Performed    Due Date  --------------------------------------------------------------------------------    HBA1C.......  6 months...  empagliflozin, insulin...  10-   04-    [facility] Embedded Care Due Messages. Reference number: [id number].   3/30/2024 10:07:23 AM CDT

## 2024-04-01 RX ORDER — EMPAGLIFLOZIN 25 MG/1
25 TABLET, FILM COATED ORAL
Qty: 90 TABLET | Refills: 0 | Status: SHIPPED | OUTPATIENT
Start: 2024-04-01

## 2024-04-01 NOTE — TELEPHONE ENCOUNTER
Refill Decision Note   Erendira Pretty  is requesting a refill authorization.  Brief Assessment and Rationale for Refill:  Approve     Medication Therapy Plan:  FLOS      Comments:     Note composed:2:17 AM 04/01/2024

## 2024-04-03 ENCOUNTER — PATIENT MESSAGE (OUTPATIENT)
Dept: PULMONOLOGY | Facility: CLINIC | Age: 79
End: 2024-04-03
Payer: MEDICARE

## 2024-04-09 ENCOUNTER — LAB VISIT (OUTPATIENT)
Dept: LAB | Facility: HOSPITAL | Age: 79
End: 2024-04-09
Attending: NURSE PRACTITIONER
Payer: MEDICARE

## 2024-04-09 DIAGNOSIS — K92.1 MELENA: ICD-10-CM

## 2024-04-09 DIAGNOSIS — E03.9 ACQUIRED HYPOTHYROIDISM: Chronic | ICD-10-CM

## 2024-04-09 DIAGNOSIS — E11.29 TYPE 2 DIABETES MELLITUS WITH MICROALBUMINURIA, WITH LONG-TERM CURRENT USE OF INSULIN: Chronic | ICD-10-CM

## 2024-04-09 DIAGNOSIS — R80.9 TYPE 2 DIABETES MELLITUS WITH MICROALBUMINURIA, WITH LONG-TERM CURRENT USE OF INSULIN: Chronic | ICD-10-CM

## 2024-04-09 DIAGNOSIS — I10 ESSENTIAL HYPERTENSION: Chronic | ICD-10-CM

## 2024-04-09 DIAGNOSIS — Z79.4 TYPE 2 DIABETES MELLITUS WITH MICROALBUMINURIA, WITH LONG-TERM CURRENT USE OF INSULIN: Chronic | ICD-10-CM

## 2024-04-09 LAB
ALBUMIN SERPL BCP-MCNC: 3.6 G/DL (ref 3.5–5.2)
ALP SERPL-CCNC: 61 U/L (ref 55–135)
ALT SERPL W/O P-5'-P-CCNC: 16 U/L (ref 10–44)
ANION GAP SERPL CALC-SCNC: 9 MMOL/L (ref 8–16)
AST SERPL-CCNC: 18 U/L (ref 10–40)
BASOPHILS # BLD AUTO: 0.05 K/UL (ref 0–0.2)
BASOPHILS # BLD AUTO: 0.05 K/UL (ref 0–0.2)
BASOPHILS NFR BLD: 1.3 % (ref 0–1.9)
BASOPHILS NFR BLD: 1.3 % (ref 0–1.9)
BILIRUB SERPL-MCNC: 0.4 MG/DL (ref 0.1–1)
BUN SERPL-MCNC: 19 MG/DL (ref 8–23)
CALCIUM SERPL-MCNC: 9.2 MG/DL (ref 8.7–10.5)
CHLORIDE SERPL-SCNC: 105 MMOL/L (ref 95–110)
CHOLEST SERPL-MCNC: 154 MG/DL (ref 120–199)
CHOLEST/HDLC SERPL: 3 {RATIO} (ref 2–5)
CO2 SERPL-SCNC: 29 MMOL/L (ref 23–29)
CREAT SERPL-MCNC: 1.1 MG/DL (ref 0.5–1.4)
DIFFERENTIAL METHOD BLD: ABNORMAL
DIFFERENTIAL METHOD BLD: ABNORMAL
EOSINOPHIL # BLD AUTO: 0.1 K/UL (ref 0–0.5)
EOSINOPHIL # BLD AUTO: 0.1 K/UL (ref 0–0.5)
EOSINOPHIL NFR BLD: 2.9 % (ref 0–8)
EOSINOPHIL NFR BLD: 2.9 % (ref 0–8)
ERYTHROCYTE [DISTWIDTH] IN BLOOD BY AUTOMATED COUNT: 28.8 % (ref 11.5–14.5)
ERYTHROCYTE [DISTWIDTH] IN BLOOD BY AUTOMATED COUNT: 28.8 % (ref 11.5–14.5)
EST. GFR  (NO RACE VARIABLE): >60 ML/MIN/1.73 M^2
ESTIMATED AVG GLUCOSE: 105 MG/DL (ref 68–131)
FERRITIN SERPL-MCNC: 143 NG/ML (ref 20–300)
GLUCOSE SERPL-MCNC: 69 MG/DL (ref 70–110)
HBA1C MFR BLD: 5.3 % (ref 4–5.6)
HCT VFR BLD AUTO: 36.3 % (ref 40–54)
HCT VFR BLD AUTO: 36.3 % (ref 40–54)
HDLC SERPL-MCNC: 51 MG/DL (ref 40–75)
HDLC SERPL: 33.1 % (ref 20–50)
HGB BLD-MCNC: 11 G/DL (ref 14–18)
HGB BLD-MCNC: 11 G/DL (ref 14–18)
IMM GRANULOCYTES # BLD AUTO: 0.03 K/UL (ref 0–0.04)
IMM GRANULOCYTES # BLD AUTO: 0.03 K/UL (ref 0–0.04)
IMM GRANULOCYTES NFR BLD AUTO: 0.8 % (ref 0–0.5)
IMM GRANULOCYTES NFR BLD AUTO: 0.8 % (ref 0–0.5)
IRON SERPL-MCNC: 67 UG/DL (ref 45–160)
LDLC SERPL CALC-MCNC: 86.2 MG/DL (ref 63–159)
LYMPHOCYTES # BLD AUTO: 1 K/UL (ref 1–4.8)
LYMPHOCYTES # BLD AUTO: 1 K/UL (ref 1–4.8)
LYMPHOCYTES NFR BLD: 25.6 % (ref 18–48)
LYMPHOCYTES NFR BLD: 25.6 % (ref 18–48)
MCH RBC QN AUTO: 26.8 PG (ref 27–31)
MCH RBC QN AUTO: 26.8 PG (ref 27–31)
MCHC RBC AUTO-ENTMCNC: 30.3 G/DL (ref 32–36)
MCHC RBC AUTO-ENTMCNC: 30.3 G/DL (ref 32–36)
MCV RBC AUTO: 89 FL (ref 82–98)
MCV RBC AUTO: 89 FL (ref 82–98)
MONOCYTES # BLD AUTO: 0.5 K/UL (ref 0.3–1)
MONOCYTES # BLD AUTO: 0.5 K/UL (ref 0.3–1)
MONOCYTES NFR BLD: 13.1 % (ref 4–15)
MONOCYTES NFR BLD: 13.1 % (ref 4–15)
NEUTROPHILS # BLD AUTO: 2.1 K/UL (ref 1.8–7.7)
NEUTROPHILS # BLD AUTO: 2.1 K/UL (ref 1.8–7.7)
NEUTROPHILS NFR BLD: 56.3 % (ref 38–73)
NEUTROPHILS NFR BLD: 56.3 % (ref 38–73)
NONHDLC SERPL-MCNC: 103 MG/DL
NRBC BLD-RTO: 0 /100 WBC
NRBC BLD-RTO: 0 /100 WBC
PLATELET # BLD AUTO: 178 K/UL (ref 150–450)
PLATELET # BLD AUTO: 178 K/UL (ref 150–450)
PMV BLD AUTO: 11 FL (ref 9.2–12.9)
PMV BLD AUTO: 11 FL (ref 9.2–12.9)
POTASSIUM SERPL-SCNC: 4.5 MMOL/L (ref 3.5–5.1)
PROT SERPL-MCNC: 6.6 G/DL (ref 6–8.4)
RBC # BLD AUTO: 4.1 M/UL (ref 4.6–6.2)
RBC # BLD AUTO: 4.1 M/UL (ref 4.6–6.2)
SATURATED IRON: 18 % (ref 20–50)
SODIUM SERPL-SCNC: 143 MMOL/L (ref 136–145)
T4 FREE SERPL-MCNC: 0.77 NG/DL (ref 0.71–1.51)
TOTAL IRON BINDING CAPACITY: 370 UG/DL (ref 250–450)
TRANSFERRIN SERPL-MCNC: 250 MG/DL (ref 200–375)
TRIGL SERPL-MCNC: 84 MG/DL (ref 30–150)
TSH SERPL DL<=0.005 MIU/L-ACNC: 10.82 UIU/ML (ref 0.4–4)
WBC # BLD AUTO: 3.75 K/UL (ref 3.9–12.7)
WBC # BLD AUTO: 3.75 K/UL (ref 3.9–12.7)

## 2024-04-09 PROCEDURE — 84439 ASSAY OF FREE THYROXINE: CPT | Performed by: NURSE PRACTITIONER

## 2024-04-09 PROCEDURE — 80061 LIPID PANEL: CPT | Performed by: NURSE PRACTITIONER

## 2024-04-09 PROCEDURE — 36415 COLL VENOUS BLD VENIPUNCTURE: CPT | Mod: PO | Performed by: NURSE PRACTITIONER

## 2024-04-09 PROCEDURE — 82728 ASSAY OF FERRITIN: CPT | Performed by: PEDIATRICS

## 2024-04-09 PROCEDURE — 80053 COMPREHEN METABOLIC PANEL: CPT | Performed by: NURSE PRACTITIONER

## 2024-04-09 PROCEDURE — 83540 ASSAY OF IRON: CPT | Performed by: PEDIATRICS

## 2024-04-09 PROCEDURE — 84443 ASSAY THYROID STIM HORMONE: CPT | Performed by: NURSE PRACTITIONER

## 2024-04-09 PROCEDURE — 85025 COMPLETE CBC W/AUTO DIFF WBC: CPT | Performed by: NURSE PRACTITIONER

## 2024-04-09 PROCEDURE — 83036 HEMOGLOBIN GLYCOSYLATED A1C: CPT | Performed by: NURSE PRACTITIONER

## 2024-04-10 ENCOUNTER — OFFICE VISIT (OUTPATIENT)
Dept: CARDIOLOGY | Facility: CLINIC | Age: 79
End: 2024-04-10
Payer: MEDICARE

## 2024-04-10 ENCOUNTER — TELEPHONE (OUTPATIENT)
Dept: INTERNAL MEDICINE | Facility: CLINIC | Age: 79
End: 2024-04-10
Payer: MEDICARE

## 2024-04-10 VITALS
DIASTOLIC BLOOD PRESSURE: 70 MMHG | SYSTOLIC BLOOD PRESSURE: 118 MMHG | HEIGHT: 67 IN | BODY MASS INDEX: 34.19 KG/M2 | OXYGEN SATURATION: 98 % | HEART RATE: 70 BPM | WEIGHT: 217.81 LBS

## 2024-04-10 DIAGNOSIS — E03.4 HYPOTHYROIDISM DUE TO ACQUIRED ATROPHY OF THYROID: Primary | ICD-10-CM

## 2024-04-10 DIAGNOSIS — I73.9 PVD (PERIPHERAL VASCULAR DISEASE): ICD-10-CM

## 2024-04-10 DIAGNOSIS — I10 ESSENTIAL HYPERTENSION: Chronic | ICD-10-CM

## 2024-04-10 DIAGNOSIS — Z95.1 S/P CABG X 2: ICD-10-CM

## 2024-04-10 DIAGNOSIS — E78.2 MIXED HYPERLIPIDEMIA: ICD-10-CM

## 2024-04-10 DIAGNOSIS — I70.0 AORTIC ATHEROSCLEROSIS: Primary | ICD-10-CM

## 2024-04-10 DIAGNOSIS — I48.0 PAROXYSMAL ATRIAL FIBRILLATION: ICD-10-CM

## 2024-04-10 DIAGNOSIS — G47.33 OSA ON CPAP: Chronic | ICD-10-CM

## 2024-04-10 DIAGNOSIS — I65.23 BILATERAL CAROTID ARTERY STENOSIS: Chronic | ICD-10-CM

## 2024-04-10 DIAGNOSIS — R07.9 CHEST PAIN, MODERATE CORONARY ARTERY RISK: ICD-10-CM

## 2024-04-10 DIAGNOSIS — I49.3 PVC (PREMATURE VENTRICULAR CONTRACTION): ICD-10-CM

## 2024-04-10 DIAGNOSIS — I35.0 NONRHEUMATIC AORTIC VALVE STENOSIS: ICD-10-CM

## 2024-04-10 PROCEDURE — 1126F AMNT PAIN NOTED NONE PRSNT: CPT | Mod: CPTII,S$GLB,, | Performed by: INTERNAL MEDICINE

## 2024-04-10 PROCEDURE — 99999 PR PBB SHADOW E&M-EST. PATIENT-LVL III: CPT | Mod: PBBFAC,,, | Performed by: INTERNAL MEDICINE

## 2024-04-10 PROCEDURE — 3078F DIAST BP <80 MM HG: CPT | Mod: CPTII,S$GLB,, | Performed by: INTERNAL MEDICINE

## 2024-04-10 PROCEDURE — 3072F LOW RISK FOR RETINOPATHY: CPT | Mod: CPTII,S$GLB,, | Performed by: INTERNAL MEDICINE

## 2024-04-10 PROCEDURE — 1157F ADVNC CARE PLAN IN RCRD: CPT | Mod: CPTII,S$GLB,, | Performed by: INTERNAL MEDICINE

## 2024-04-10 PROCEDURE — 3074F SYST BP LT 130 MM HG: CPT | Mod: CPTII,S$GLB,, | Performed by: INTERNAL MEDICINE

## 2024-04-10 PROCEDURE — 99214 OFFICE O/P EST MOD 30 MIN: CPT | Mod: S$GLB,,, | Performed by: INTERNAL MEDICINE

## 2024-04-10 PROCEDURE — 1101F PT FALLS ASSESS-DOCD LE1/YR: CPT | Mod: CPTII,S$GLB,, | Performed by: INTERNAL MEDICINE

## 2024-04-10 PROCEDURE — 3288F FALL RISK ASSESSMENT DOCD: CPT | Mod: CPTII,S$GLB,, | Performed by: INTERNAL MEDICINE

## 2024-04-10 RX ORDER — ASPIRIN 81 MG/1
81 TABLET ORAL DAILY
Qty: 90 TABLET | Refills: 3 | Status: SHIPPED | OUTPATIENT
Start: 2024-04-10 | End: 2025-04-10

## 2024-04-10 RX ORDER — LEVOTHYROXINE SODIUM 50 UG/1
50 TABLET ORAL
Qty: 90 TABLET | Refills: 3 | Status: SHIPPED | OUTPATIENT
Start: 2024-04-10 | End: 2025-04-10

## 2024-04-10 NOTE — TELEPHONE ENCOUNTER
----- Message from Rosa Maria Beltran MA sent at 4/10/2024  9:02 AM CDT -----  Contact pt regarding results, pt understood everything verbally, and states he take Levothyroxine every morning. Please advise. Thanks.

## 2024-04-10 NOTE — TELEPHONE ENCOUNTER
----- Message from Bhupinder Callaway MD sent at 4/10/2024  6:41 AM CDT -----  Overall you labs looked good or better. Iron stores have built up. Your TSH is high, implying you are not getting enough levothyroxine. Did you miss thyroid doses or change the timing of taking the levothyroxine?

## 2024-04-10 NOTE — PROGRESS NOTES
Subjective:   Patient ID:  Erendira Pretty is a 78 y.o. male who presents for follow-up of Chest Pain  Pt saw GI- ok for NOAC. Pt did see Dr. Bradley ROSSI and watchman planned.  Patient denies CP, angina or anginal equivalent.Pt has lost 30 lbs.  NMT/stress 2023    Hypertension  This is a chronic problem. The current episode started more than 1 year ago. The problem has been gradually improving since onset. The problem is controlled. Pertinent negatives include no chest pain, palpitations or shortness of breath. Past treatments include ACE inhibitors, beta blockers and calcium channel blockers. The current treatment provides moderate improvement. There are no compliance problems.  Hypertensive end-organ damage includes CAD/MI.   Coronary Artery Disease  Presents for follow-up visit. Pertinent negatives include no chest pain, chest pressure, chest tightness, dizziness, leg swelling, muscle weakness, palpitations, shortness of breath or weight gain. The symptoms have been stable. Compliance with diet is variable. Compliance with exercise is variable. Compliance with medications is good.   Hyperlipidemia  This is a chronic problem. The current episode started more than 1 year ago. The problem is controlled. Pertinent negatives include no chest pain or shortness of breath. Treatments tried: repatha. The current treatment provides moderate improvement of lipids. There are no compliance problems.        Review of Systems   Constitutional: Negative. Negative for weight gain.   HENT: Negative.     Eyes: Negative.    Cardiovascular: Negative.  Negative for chest pain, leg swelling and palpitations.   Respiratory: Negative.  Negative for chest tightness and shortness of breath.    Endocrine: Negative.    Hematologic/Lymphatic: Negative.    Skin: Negative.    Musculoskeletal:  Negative for muscle weakness.   Gastrointestinal: Negative.    Genitourinary: Negative.    Neurological: Negative.  Negative for dizziness.    Psychiatric/Behavioral: Negative.     Allergic/Immunologic: Negative.    All other systems reviewed and are negative.    Family History   Problem Relation Age of Onset    Heart disease Mother     Heart disease Father     Heart disease Brother     Colon cancer Neg Hx      Past Medical History:   Diagnosis Date    Anticoagulant long-term use     Aortic stenosis     Asthma     Benign prostatic hyperplasia with nocturia     Bilateral carotid artery stenosis 2021    US CAROTID BILATERAL 2021 IMPRESSION: Atherosclerosis with suspected stenosis greater than 50% at the right proximal ICA visually. Velocities are discordant and appear improved from prior. Atherosclerosis on the left with no evidence of flow-limiting stenosis greater than 50%.  FINDINGS: Right: Internal Carotid Artery (ICA): Peak systolic velocity 118 cm/sec. End diastolic velocity 35 cm/sec    BPH (benign prostatic hyperplasia)     CAD (coronary artery disease)     40% lesion ;lazo    Cirrhosis     Claudication 2014    Colon polyp     COPD (chronic obstructive pulmonary disease)     ED (erectile dysfunction)     Encounter for blood transfusion     Ex-smoker     Hearing loss NEC     Hepatitis C     Cured;reji brown     Hyperlipidemia     Hypertension     Hypothyroid     s/p tx graves    Open angle with borderline findings and low glaucoma risk in both eyes 2020    DELONTE on CPAP     Osteoarthritis     Paroxysmal atrial fibrillation     PVD (peripheral vascular disease)     Secondary esophageal varices without bleeding 2017    Type 2 diabetes mellitus      Social History     Socioeconomic History    Marital status:      Spouse name: YAEL    Number of children: 2   Tobacco Use    Smoking status: Former     Current packs/day: 0.00     Average packs/day: 0.5 packs/day for 4.0 years (2.0 total pack years)     Types: Cigarettes     Start date: 1968     Quit date: 1972     Years since quittin.8     Smokeless tobacco: Former     Types: Chew     Quit date: 11/18/1976   Substance and Sexual Activity    Alcohol use: Yes     Alcohol/week: 2.0 standard drinks of alcohol     Types: 2 Cans of beer per week    Drug use: No    Sexual activity: Yes     Partners: Female   Social History Narrative    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    No pets or smokers in household, lives alone.     Current Outpatient Medications on File Prior to Visit   Medication Sig Dispense Refill    ACCU-CHEK GRACE PLUS METER Misc AS DIRECTED 1 each 0    albuterol (VENTOLIN HFA) 90 mcg/actuation inhaler Inhale 2 puffs into the lungs every 6 (six) hours as needed for Wheezing or Shortness of Breath. Rescue 18 g 3    amLODIPine (NORVASC) 10 MG tablet TAKE 1 TABLET BY MOUTH ONCE A DAY (DOSE INCREASE) (Patient taking differently: Take by mouth every morning.) 30 tablet 11    blood sugar diagnostic (ACCU-CHEK GRACE PLUS TEST STRP) Strp TEST THREE TIMES A  strip 11    budesonide-formoterol 160-4.5 mcg (SYMBICORT) 160-4.5 mcg/actuation HFAA INHALE 2 PUFFS INTO THE LUNGS TWO TIMES A DAY. 10.2 g 11    carvediloL (COREG) 12.5 MG tablet Take 1 tablet (12.5 mg total) by mouth 2 (two) times daily with meals. 60 tablet 2    finasteride (PROSCAR) 5 mg tablet TAKE 1 TABLET BY MOUTH once daily 90 tablet 2    furosemide (LASIX) 40 MG tablet Take 1 tablet (40 mg total) by mouth 2 (two) times daily. 60 tablet 11    GLUCOSAMINE HCL/CHONDR ROSARIO A NA (OSTEO BI-FLEX ORAL) Take 1 tablet by mouth 2 (two) times daily.       insulin (LANTUS SOLOSTAR U-100 INSULIN) glargine 100 units/mL SubQ pen Inject 44 Units into the skin every evening. 45 mL 1    JARDIANCE 25 mg tablet TAKE 1 TABLET BY MOUTH EVERY DAY 90 tablet 0    lancets (ACCU-CHEK FASTCLIX LANCET DRUM) Misc TEST TWO TIMES A  each 5    levocetirizine (XYZAL) 5 MG tablet Take 1 tablet (5 mg total) by mouth every evening. 30 tablet 11     "levothyroxine (SYNTHROID) 300 MCG Tab TAKE 1 TABLET BY MOUTH EVERY MORNING 90 tablet 3    lisinopriL 10 MG tablet TAKE 1 BY MOUTH EVERY DAY (Patient taking differently: Take 10 mg by mouth every morning.) 30 tablet 7    metFORMIN (GLUCOPHAGE-XR) 500 MG ER 24hr tablet Take 1 tablet (500 mg total) by mouth 2 (two) times daily with meals. 180 tablet 3    montelukast (SINGULAIR) 10 mg tablet Take 1 tablet (10 mg total) by mouth once daily. 90 tablet 3    pen needle, diabetic (BD ULTRA-FINE MINI PEN NEEDLE) 31 gauge x 3/16" Ndle USE AS DIRECTED 100 each 11    propafenone (RHTHYMOL) 150 MG Tab Take 1 tablet (150 mg total) by mouth every 8 (eight) hours. 90 tablet 11    RABEprazole (ACIPHEX) 20 mg tablet Take 1 tablet (20 mg total) by mouth 2 (two) times daily. 180 tablet 3    ranolazine (RANEXA) 1,000 mg Tb12 Take 1 tablet (1,000 mg total) by mouth 2 (two) times daily. 60 tablet 2    REPATHA SURECLICK 140 mg/mL PnIj SMARTSI Milligram(s) Topical Every 2 Weeks      rivaroxaban (XARELTO) 20 mg Tab Take 1 tablet (20 mg total) by mouth daily with dinner or evening meal. 30 tablet 11    sildenafiL (VIAGRA) 100 MG tablet Take 1/2 to 1 tablet by mouth one hour prior to intercourse. Max 100mg per day 30 tablet 11     Current Facility-Administered Medications on File Prior to Visit   Medication Dose Route Frequency Provider Last Rate Last Admin    lactated ringers infusion   Intravenous Continuous Omaira Theodore MD   New Bag at 19 [phone]     Review of patient's allergies indicates:   Allergen Reactions    Lipitor [atorvastatin] Other (See Comments)     Muscle aches and pains as well as fatigue.     Niacin preparations Other (See Comments)     Causes broken blood vessels and bruises at 4 times normal dose.    Statins-hmg-coa reductase inhibitors Other (See Comments)     Statins cause intolerable myalgias.    Iodinated contrast media Hives     Tachycardia    Omeprazole Hives and Itching    Prilosec [omeprazole " magnesium] Hives and Itching       Objective:     Physical Exam  Vitals and nursing note reviewed.   Constitutional:       Appearance: He is well-developed.   HENT:      Head: Normocephalic and atraumatic.   Eyes:      Conjunctiva/sclera: Conjunctivae normal.      Pupils: Pupils are equal, round, and reactive to light.   Cardiovascular:      Rate and Rhythm: Normal rate and regular rhythm.      Pulses: Intact distal pulses.      Heart sounds: Normal heart sounds.   Pulmonary:      Effort: Pulmonary effort is normal.      Breath sounds: Normal breath sounds.   Abdominal:      General: Bowel sounds are normal.      Palpations: Abdomen is soft.   Musculoskeletal:      Cervical back: Normal range of motion and neck supple.   Skin:     General: Skin is warm and dry.   Neurological:      Mental Status: He is alert and oriented to person, place, and time.         Assessment:     1. Aortic atherosclerosis    2. Chest pain, moderate coronary artery risk    3. Bilateral carotid artery stenosis    4. Essential hypertension    5. Mixed hyperlipidemia    6. Nonrheumatic aortic valve stenosis    7. Paroxysmal atrial fibrillation    8. PVC (premature ventricular contraction)    9. PVD (peripheral vascular disease)    10. S/P CABG x 2    11. DELONTE on CPAP        Plan:     Aortic atherosclerosis    Chest pain, moderate coronary artery risk  -     Ambulatory referral/consult to Cardiology    Bilateral carotid artery stenosis    Essential hypertension    Mixed hyperlipidemia    Nonrheumatic aortic valve stenosis    Paroxysmal atrial fibrillation    PVC (premature ventricular contraction)    PVD (peripheral vascular disease)    S/P CABG x 2    DELONTE on CPAP    Continue lisinopril , norvasc , coreg- HTN  continue repatha - HLP  Continue propafenone, xarelto  - PAF     Consider leqvio

## 2024-04-11 ENCOUNTER — OFFICE VISIT (OUTPATIENT)
Dept: UROLOGY | Facility: CLINIC | Age: 79
End: 2024-04-11
Payer: MEDICARE

## 2024-04-11 ENCOUNTER — LAB VISIT (OUTPATIENT)
Dept: LAB | Facility: HOSPITAL | Age: 79
End: 2024-04-11
Attending: UROLOGY
Payer: MEDICARE

## 2024-04-11 VITALS
HEART RATE: 81 BPM | SYSTOLIC BLOOD PRESSURE: 152 MMHG | DIASTOLIC BLOOD PRESSURE: 94 MMHG | BODY MASS INDEX: 34.15 KG/M2 | WEIGHT: 218.06 LBS

## 2024-04-11 DIAGNOSIS — R39.11 BENIGN PROSTATIC HYPERPLASIA WITH URINARY HESITANCY: Primary | ICD-10-CM

## 2024-04-11 DIAGNOSIS — N40.1 BENIGN PROSTATIC HYPERPLASIA WITH URINARY HESITANCY: Primary | ICD-10-CM

## 2024-04-11 DIAGNOSIS — R35.1 NOCTURIA: ICD-10-CM

## 2024-04-11 DIAGNOSIS — N40.1 BENIGN PROSTATIC HYPERPLASIA WITH URINARY HESITANCY: ICD-10-CM

## 2024-04-11 DIAGNOSIS — R39.11 BENIGN PROSTATIC HYPERPLASIA WITH URINARY HESITANCY: ICD-10-CM

## 2024-04-11 LAB — COMPLEXED PSA SERPL-MCNC: 0.22 NG/ML (ref 0–4)

## 2024-04-11 PROCEDURE — 84153 ASSAY OF PSA TOTAL: CPT | Performed by: UROLOGY

## 2024-04-11 PROCEDURE — 99204 OFFICE O/P NEW MOD 45 MIN: CPT | Mod: S$GLB,,, | Performed by: UROLOGY

## 2024-04-11 PROCEDURE — 3077F SYST BP >= 140 MM HG: CPT | Mod: CPTII,S$GLB,, | Performed by: UROLOGY

## 2024-04-11 PROCEDURE — 1101F PT FALLS ASSESS-DOCD LE1/YR: CPT | Mod: CPTII,S$GLB,, | Performed by: UROLOGY

## 2024-04-11 PROCEDURE — 3080F DIAST BP >= 90 MM HG: CPT | Mod: CPTII,S$GLB,, | Performed by: UROLOGY

## 2024-04-11 PROCEDURE — 1157F ADVNC CARE PLAN IN RCRD: CPT | Mod: CPTII,S$GLB,, | Performed by: UROLOGY

## 2024-04-11 PROCEDURE — 99999 PR PBB SHADOW E&M-EST. PATIENT-LVL IV: CPT | Mod: PBBFAC,,, | Performed by: UROLOGY

## 2024-04-11 PROCEDURE — 1126F AMNT PAIN NOTED NONE PRSNT: CPT | Mod: CPTII,S$GLB,, | Performed by: UROLOGY

## 2024-04-11 PROCEDURE — 1159F MED LIST DOCD IN RCRD: CPT | Mod: CPTII,S$GLB,, | Performed by: UROLOGY

## 2024-04-11 PROCEDURE — 3072F LOW RISK FOR RETINOPATHY: CPT | Mod: CPTII,S$GLB,, | Performed by: UROLOGY

## 2024-04-11 PROCEDURE — 36415 COLL VENOUS BLD VENIPUNCTURE: CPT | Mod: PO | Performed by: UROLOGY

## 2024-04-11 PROCEDURE — 3288F FALL RISK ASSESSMENT DOCD: CPT | Mod: CPTII,S$GLB,, | Performed by: UROLOGY

## 2024-04-11 RX ORDER — MIRABEGRON 25 MG/1
25 TABLET, FILM COATED, EXTENDED RELEASE ORAL DAILY
Qty: 30 TABLET | Refills: 11 | Status: SHIPPED | OUTPATIENT
Start: 2024-04-11 | End: 2025-04-11

## 2024-04-11 NOTE — PROGRESS NOTES
Chief Complaint:   Encounter Diagnoses   Name Primary?    Benign prostatic hyperplasia with urinary hesitancy Yes    Nocturia        HPI:  HPI Erendira Pretty aracely 78 y.o. male who presents with complaints of nocturia.  He states he gets up 3-4 times a night.  This has been going on for many years.  He previously saw Dr. Cordova was started on finasteride many years ago.  He continues to take this.  He did have some urethral polyps that were resected and a partial TURP at that time.  Pathology was benign.  He had a brief trial Flomax in the past.  Patient's AUA symptom score today is nine.  It was main complaint is 3 time a night nocturia.  Denies any hematuria.  Asked referral for Urology and wanted to get his prostate checked.      History:  Social History     Tobacco Use    Smoking status: Former     Current packs/day: 0.00     Average packs/day: 0.5 packs/day for 4.0 years (2.0 ttl pk-yrs)     Types: Cigarettes     Start date: 1968     Quit date: 1972     Years since quittin.8    Smokeless tobacco: Former     Types: Chew     Quit date: 1976   Substance Use Topics    Alcohol use: Yes     Alcohol/week: 2.0 standard drinks of alcohol     Types: 2 Cans of beer per week    Drug use: No   Retired .  Now enjoys hunting and fishing.  Past Medical History:   Diagnosis Date    Anticoagulant long-term use     Aortic stenosis     Asthma     Benign prostatic hyperplasia with nocturia     Bilateral carotid artery stenosis 2021     CAROTID BILATERAL 2021 IMPRESSION: Atherosclerosis with suspected stenosis greater than 50% at the right proximal ICA visually. Velocities are discordant and appear improved from prior. Atherosclerosis on the left with no evidence of flow-limiting stenosis greater than 50%.  FINDINGS: Right: Internal Carotid Artery (ICA): Peak systolic velocity 118 cm/sec. End diastolic velocity 35 cm/sec    BPH (benign prostatic hyperplasia)     CAD (coronary  artery disease)     40% lesion 2002;lazo    Cirrhosis     Claudication 04/09/2014    Colon polyp     COPD (chronic obstructive pulmonary disease)     ED (erectile dysfunction)     Encounter for blood transfusion     Ex-smoker     Hearing loss NEC     Hepatitis C     Cured;reji brown 2015    Hyperlipidemia     Hypertension     Hypothyroid     s/p tx graves    Open angle with borderline findings and low glaucoma risk in both eyes 09/22/2020    DELONTE on CPAP     Osteoarthritis     Paroxysmal atrial fibrillation     PVD (peripheral vascular disease)     Secondary esophageal varices without bleeding 06/26/2017    Type 2 diabetes mellitus      Past Surgical History:   Procedure Laterality Date    ANGIOGRAM, EXTREMITY, UNILATERAL Left 1/4/2024    Procedure: ANGIOGRAM, EXTREMITY, UNILATERAL- Femoral / SMS Stent, Poss General;  Surgeon: ALEX Alfred III, MD;  Location: [facility] OR [address] [place] [address];  Service: Vascular;  Laterality: Left;  mGy : 2698.78  Gy.cm : 229.88  Contrast : 62ml  Fluro time : 13.8min    CARDIAC CATHETERIZATION      CARDIAC SURGERY      CARPAL TUNNEL RELEASE Left     CATARACT EXTRACTION      CATARACT EXTRACTION W/ INTRAOCULAR LENS  IMPLANT, BILATERAL  2008    CATHETERIZATION OF BOTH LEFT AND RIGHT HEART N/A 11/2/2018    Procedure: CATHETERIZATION, HEART, BOTH LEFT AND RIGHT;  Surgeon: Frank Lazo MD;  Location: [facility] CATH LAB;  Service: Cardiology;  Laterality: N/A;    COLONOSCOPY  8/2013    COLONOSCOPY N/A 5/30/2016    Procedure: COLONOSCOPY;  Surgeon: Anna Tomas MD;  Location: [facility];  Service: Endoscopy;  Laterality: N/A;    COLONOSCOPY N/A 7/17/2019    Procedure: COLONOSCOPY;  Surgeon: David Carter III, MD;  Location: [facility];  Service: Endoscopy;  Laterality: N/A;    COLONOSCOPY N/A 12/14/2023    Procedure: COLONOSCOPY;  Surgeon: Ama Barrera MD;  Location: [facility] ENDO;  Service: Endoscopy;  Laterality: N/A;    COMPUTED TOMOGRAPHY N/A 9/9/2019    Procedure: CT (COMPUTED  TOMOGRAPHY);  Surgeon: [facility] Diagnostic Provider;  Location: [facility] OR;  Service: General;  Laterality: N/A;  Microwave ablation to be done in CT.  Need CRNA and cart    COMPUTED TOMOGRAPHY N/A 1/25/2022    Procedure: Liver Microwave Ablation;  Surgeon: Red Puentes MD;  [facility];  Service: General;  Laterality: N/A;    CORONARY ARTERY BYPASS GRAFT (CABG) N/A 11/5/2018    Procedure: CORONARY ARTERY BYPASS GRAFT (CABG);  Surgeon: Bear De Luna MD;  Location: [facility] OR;  Service: Cardiothoracic;  Laterality: N/A;  TWO VESSEL BYPASS WITH AORTOTOMY AND EXCISION OF ATHEROSCLEROTIC PLAQUE    CORONARY BYPASS GRAFT ANGIOGRAPHY  3/2/2020    Procedure: Bypass graft study;  Surgeon: Cora Cormier MD;  Location: [facility] CATH LAB;  Service: Cardiology;;    ELBOW BURSA SURGERY Right 2010    ENDOSCOPIC HARVEST OF VEIN Left 11/5/2018    Procedure: SURGICAL PROCUREMENT, VEIN, ENDOSCOPIC;  Surgeon: Bear De Luna MD;  Location: [facility];  Service: Cardiothoracic;  Laterality: Left;    ESOPHAGOGASTRODUODENOSCOPY N/A 7/17/2019    Procedure: ESOPHAGOGASTRODUODENOSCOPY (EGD);  Surgeon: David Carter III, MD;  Location: [facility] ENDO;  Service: Endoscopy;  Laterality: N/A;    ESOPHAGOGASTRODUODENOSCOPY N/A 12/29/2022    Procedure: ESOPHAGOGASTRODUODENOSCOPY (EGD);  Surgeon: Issa Gayle MD;  Location: [facility] ENDO;  Service: Endoscopy;  Laterality: N/A;    ESOPHAGOGASTRODUODENOSCOPY N/A 1/17/2024    Procedure: EGD (ESOPHAGOGASTRODUODENOSCOPY);  Surgeon: Issa Gayle MD;  Location: [facility] ENDO;  Service: Endoscopy;  Laterality: N/A;    ETHMOIDECTOMY Bilateral 3/27/2019    Procedure: ETHMOIDECTOMY;  Surgeon: Alejandro Parkinson MD;  Location: [facility] OR;  Service: ENT;  Laterality: Bilateral;    EYE SURGERY      FOOT SURGERY      FRONTAL SINUS OBLITERATION Bilateral 3/27/2019    Procedure: SINUSOTOMY, FRONTAL SINUS, OBLITERATIVE;  Surgeon: Alejandro Parkinson MD;  Location: [facility] OR;  Service: ENT;  Laterality: Bilateral;    FUNCTIONAL  ENDOSCOPIC SINUS SURGERY (FESS) Bilateral 3/27/2019    Procedure: FESS (FUNCTIONAL ENDOSCOPIC SINUS SURGERY);  Surgeon: Alejandro Parkinson MD;  Location: [facility] OR;  Service: ENT;  Laterality: Bilateral;  Left Keila bullosa resection     INTRALUMINAL GASTROINTESTINAL TRACT IMAGING VIA CAPSULE N/A 2/2/2024    Procedure: IMAGING PROCEDURE, GI TRACT, INTRALUMINAL, VIA CAPSULE;  Surgeon: Cooper Estrella RN;  Location: [facility] ENDO;  Service: Endoscopy;  Laterality: N/A;    KNEE ARTHROSCOPY W/ MENISCAL REPAIR Left 06/2019    dr boykin    KNEE SURGERY Right     LEFT HEART CATHETERIZATION Left 3/2/2020    Procedure: CATHETERIZATION, HEART, LEFT;  Surgeon: Cora Cormier MD;  Location: [facility] CATH LAB;  Service: Cardiology;  Laterality: Left;    LIVER BIOPSY  01/2022    MAXILLARY ANTROSTOMY Bilateral 3/27/2019    Procedure: MAXILLARY ANTROSTOMY;  Surgeon: Alejandro Parkinson MD;  Location: [facility] OR;  Service: ENT;  Laterality: Bilateral;    NASAL SEPTOPLASTY N/A 3/27/2019    Procedure: SEPTOPLASTY, NOSE;  Surgeon: Alejandro Parkinson MD;  Location: [facility] OR;  Service: ENT;  Laterality: N/A;    RECTAL SURGERY  2012    STENT, SUPERIOR MESENTERIC ARTERY Left 1/4/2024    Procedure: STENT, SUPERIOR MESENTERIC ARTERY;  Surgeon: ALEX Alfred III, MD;  Location: [address];  Service: Vascular;  Laterality: Left;    TRANSURETHRAL RESECTION OF PROSTATE (TURP) WITHOUT USE OF LASER N/A 6/19/2018    Procedure: TURP, WITHOUT USING LASER;  Surgeon: Cooper Cordova IV, MD;  Location: [facility];  Service: Urology;  Laterality: N/A;    TRIGGER FINGER RELEASE Left      Family History   Problem Relation Age of Onset    Heart disease Mother     Heart disease Father     Heart disease Brother     Colon cancer Neg Hx        Current Outpatient Medications on File Prior to Visit   Medication Sig Dispense Refill    ACCU-CHEK GRACE PLUS METER Misc AS DIRECTED 1 each 0    albuterol (VENTOLIN HFA) 90 mcg/actuation inhaler Inhale 2 puffs into the lungs every 6 (six) hours as  needed for Wheezing or Shortness of Breath. Rescue 18 g 3    amLODIPine (NORVASC) 10 MG tablet TAKE 1 TABLET BY MOUTH ONCE A DAY (DOSE INCREASE) (Patient taking differently: Take by mouth every morning.) 30 tablet 11    aspirin (ECOTRIN) 81 MG EC tablet Take 1 tablet (81 mg total) by mouth once daily. 90 tablet 3    blood sugar diagnostic (ACCU-CHEK GRACE PLUS TEST STRP) Strp TEST THREE TIMES A  strip 11    budesonide-formoterol 160-4.5 mcg (SYMBICORT) 160-4.5 mcg/actuation HFAA INHALE 2 PUFFS INTO THE LUNGS TWO TIMES A DAY. 10.2 g 11    carvediloL (COREG) 12.5 MG tablet Take 1 tablet (12.5 mg total) by mouth 2 (two) times daily with meals. 60 tablet 2    finasteride (PROSCAR) 5 mg tablet TAKE 1 TABLET BY MOUTH once daily 90 tablet 2    furosemide (LASIX) 40 MG tablet Take 1 tablet (40 mg total) by mouth 2 (two) times daily. 60 tablet 11    GLUCOSAMINE HCL/CHONDR ROSARIO A NA (OSTEO BI-FLEX ORAL) Take 1 tablet by mouth 2 (two) times daily.       insulin (LANTUS SOLOSTAR U-100 INSULIN) glargine 100 units/mL SubQ pen Inject 44 Units into the skin every evening. 45 mL 1    JARDIANCE 25 mg tablet TAKE 1 TABLET BY MOUTH EVERY DAY 90 tablet 0    lancets (ACCU-CHEK FASTCLIX LANCET DRUM) Misc TEST TWO TIMES A  each 5    levocetirizine (XYZAL) 5 MG tablet Take 1 tablet (5 mg total) by mouth every evening. 30 tablet 11    levothyroxine (SYNTHROID) 300 MCG Tab TAKE 1 TABLET BY MOUTH EVERY MORNING 90 tablet 3    levothyroxine (SYNTHROID) 50 MCG tablet Take 1 tablet (50 mcg total) by mouth before breakfast. 90 tablet 3    lisinopriL 10 MG tablet TAKE 1 BY MOUTH EVERY DAY (Patient taking differently: Take 10 mg by mouth every morning.) 30 tablet 7    metFORMIN (GLUCOPHAGE-XR) 500 MG ER 24hr tablet Take 1 tablet (500 mg total) by mouth 2 (two) times daily with meals. 180 tablet 3    montelukast (SINGULAIR) 10 mg tablet Take 1 tablet (10 mg total) by mouth once daily. 90 tablet 3    pen needle, diabetic (BD ULTRA-FINE MINI  "PEN NEEDLE) 31 gauge x 3/16" Ndle USE AS DIRECTED 100 each 11    propafenone (RHTHYMOL) 150 MG Tab Take 1 tablet (150 mg total) by mouth every 8 (eight) hours. 90 tablet 11    RABEprazole (ACIPHEX) 20 mg tablet Take 1 tablet (20 mg total) by mouth 2 (two) times daily. 180 tablet 3    ranolazine (RANEXA) 1,000 mg Tb12 Take 1 tablet (1,000 mg total) by mouth 2 (two) times daily. 60 tablet 2    REPATHA SURECLICK 140 mg/mL PnIj SMARTSI Milligram(s) Topical Every 2 Weeks      rivaroxaban (XARELTO) 20 mg Tab Take 1 tablet (20 mg total) by mouth daily with dinner or evening meal. 30 tablet 11    sildenafiL (VIAGRA) 100 MG tablet Take 1/2 to 1 tablet by mouth one hour prior to intercourse. Max 100mg per day 30 tablet 11     Current Facility-Administered Medications on File Prior to Visit   Medication Dose Route Frequency Provider Last Rate Last Admin    lactated ringers infusion   Intravenous Continuous Omaira Theodore MD   New Bag at 19 1117        Objective:     Vitals:    24 0848   BP: (!) 152/94   Pulse: 81   Weight: 98.9 kg (218 lb 0.6 oz)      BMI Readings from Last 1 Encounters:   24 34.15 kg/m²          Physical Exam    No acute distress alert and oriented  Respirations even unlabored   Abdomen is obese  Digital rectal exam reveals a 25 g prostate    Lab Results   Component Value Date    PSA 0.17 10/09/2023    PSA 0.14 2022    PSA 0.14 2021    PSA 0.27 2020    PSA 0.08 2019    PSA 0.34 2018    PSA 0.35 2018    PSA 0.40 2016    PSA 0.26 04/15/2015    PSA 0.32 2014    PSA 0.40 2013    PSA 0.31 2012    PSA 0.31 2011    PSA 0.30 2011    PSA 0.34 2010    PSA 0.21 2009    PSA 0.3 2008    PSA 0.2 2005    PSA 0.2 2004        Lab Results   Component Value Date    CREATININE 1.1 2024      Assessment:       1. Benign prostatic hyperplasia with urinary hesitancy    2. Nocturia        Plan: "     1. Benign prostatic hyperplasia with urinary hesitancy    2. Nocturia       Orders Placed This Encounter    Prostate Specific Antigen, Diagnostic    mirabegron (MYRBETRIQ) 25 mg Tb24 ER tablet      BPH with nocturia his main complaint.  Discussed with him treatment options.  He was already on finasteride.  His PSA is very low.  He has tried Flomax in the distant past.  Offered him a trial of mirabegron.  Discussed Lasix during the daytime is likely causing his early morning frequency.  He has also been on Jardiance for a couple of years.  This is helped with his A1c.  We will see him back in 6 months.

## 2024-04-16 ENCOUNTER — OFFICE VISIT (OUTPATIENT)
Dept: INTERNAL MEDICINE | Facility: CLINIC | Age: 79
End: 2024-04-16
Payer: MEDICARE

## 2024-04-16 VITALS
SYSTOLIC BLOOD PRESSURE: 136 MMHG | OXYGEN SATURATION: 99 % | BODY MASS INDEX: 34.91 KG/M2 | HEIGHT: 67 IN | HEART RATE: 84 BPM | TEMPERATURE: 98 F | WEIGHT: 222.44 LBS | DIASTOLIC BLOOD PRESSURE: 84 MMHG | RESPIRATION RATE: 17 BRPM

## 2024-04-16 DIAGNOSIS — C22.0 HCC (HEPATOCELLULAR CARCINOMA): ICD-10-CM

## 2024-04-16 DIAGNOSIS — T45.515S: ICD-10-CM

## 2024-04-16 DIAGNOSIS — N40.1 BENIGN PROSTATIC HYPERPLASIA WITH NOCTURIA: Chronic | ICD-10-CM

## 2024-04-16 DIAGNOSIS — Z79.4 TYPE 2 DIABETES MELLITUS WITH MICROALBUMINURIA, WITH LONG-TERM CURRENT USE OF INSULIN: Chronic | ICD-10-CM

## 2024-04-16 DIAGNOSIS — E03.9 ACQUIRED HYPOTHYROIDISM: Chronic | ICD-10-CM

## 2024-04-16 DIAGNOSIS — T46.6X5A STATIN MYOPATHY: ICD-10-CM

## 2024-04-16 DIAGNOSIS — Z95.1 S/P CABG X 2: ICD-10-CM

## 2024-04-16 DIAGNOSIS — G72.0 STATIN MYOPATHY: ICD-10-CM

## 2024-04-16 DIAGNOSIS — K21.00 GASTROESOPHAGEAL REFLUX DISEASE WITH ESOPHAGITIS WITHOUT HEMORRHAGE: ICD-10-CM

## 2024-04-16 DIAGNOSIS — R80.9 TYPE 2 DIABETES MELLITUS WITH MICROALBUMINURIA, WITH LONG-TERM CURRENT USE OF INSULIN: Chronic | ICD-10-CM

## 2024-04-16 DIAGNOSIS — E78.2 MIXED HYPERLIPIDEMIA: ICD-10-CM

## 2024-04-16 DIAGNOSIS — I25.10 CORONARY ARTERY DISEASE INVOLVING NATIVE CORONARY ARTERY OF NATIVE HEART WITHOUT ANGINA PECTORIS: Chronic | ICD-10-CM

## 2024-04-16 DIAGNOSIS — I35.0 NONRHEUMATIC AORTIC VALVE STENOSIS: ICD-10-CM

## 2024-04-16 DIAGNOSIS — D64.9 ANEMIA, UNSPECIFIED TYPE: ICD-10-CM

## 2024-04-16 DIAGNOSIS — I48.0 PAROXYSMAL ATRIAL FIBRILLATION: ICD-10-CM

## 2024-04-16 DIAGNOSIS — I65.23 BILATERAL CAROTID ARTERY STENOSIS: Chronic | ICD-10-CM

## 2024-04-16 DIAGNOSIS — R10.32 LEFT GROIN PAIN: ICD-10-CM

## 2024-04-16 DIAGNOSIS — E11.29 TYPE 2 DIABETES MELLITUS WITH MICROALBUMINURIA, WITH LONG-TERM CURRENT USE OF INSULIN: Chronic | ICD-10-CM

## 2024-04-16 DIAGNOSIS — R35.1 BENIGN PROSTATIC HYPERPLASIA WITH NOCTURIA: Chronic | ICD-10-CM

## 2024-04-16 DIAGNOSIS — I73.9 PVD (PERIPHERAL VASCULAR DISEASE): ICD-10-CM

## 2024-04-16 DIAGNOSIS — E66.01 CLASS 2 SEVERE OBESITY DUE TO EXCESS CALORIES WITH SERIOUS COMORBIDITY AND BODY MASS INDEX (BMI) OF 37.0 TO 37.9 IN ADULT: ICD-10-CM

## 2024-04-16 DIAGNOSIS — I10 ESSENTIAL HYPERTENSION: Primary | Chronic | ICD-10-CM

## 2024-04-16 DIAGNOSIS — G47.33 OSA ON CPAP: Chronic | ICD-10-CM

## 2024-04-16 DIAGNOSIS — J45.20 MILD INTERMITTENT ASTHMA WITHOUT COMPLICATION: ICD-10-CM

## 2024-04-16 PROCEDURE — 1126F AMNT PAIN NOTED NONE PRSNT: CPT | Mod: CPTII,S$GLB,, | Performed by: PEDIATRICS

## 2024-04-16 PROCEDURE — 3079F DIAST BP 80-89 MM HG: CPT | Mod: CPTII,S$GLB,, | Performed by: PEDIATRICS

## 2024-04-16 PROCEDURE — 99214 OFFICE O/P EST MOD 30 MIN: CPT | Mod: S$GLB,,, | Performed by: PEDIATRICS

## 2024-04-16 PROCEDURE — G2211 COMPLEX E/M VISIT ADD ON: HCPCS | Mod: S$GLB,,, | Performed by: PEDIATRICS

## 2024-04-16 PROCEDURE — 3288F FALL RISK ASSESSMENT DOCD: CPT | Mod: CPTII,S$GLB,, | Performed by: PEDIATRICS

## 2024-04-16 PROCEDURE — 1160F RVW MEDS BY RX/DR IN RCRD: CPT | Mod: CPTII,S$GLB,, | Performed by: PEDIATRICS

## 2024-04-16 PROCEDURE — 99999 PR PBB SHADOW E&M-EST. PATIENT-LVL III: CPT | Mod: PBBFAC,,, | Performed by: PEDIATRICS

## 2024-04-16 PROCEDURE — 1101F PT FALLS ASSESS-DOCD LE1/YR: CPT | Mod: CPTII,S$GLB,, | Performed by: PEDIATRICS

## 2024-04-16 PROCEDURE — 3072F LOW RISK FOR RETINOPATHY: CPT | Mod: CPTII,S$GLB,, | Performed by: PEDIATRICS

## 2024-04-16 PROCEDURE — 3075F SYST BP GE 130 - 139MM HG: CPT | Mod: CPTII,S$GLB,, | Performed by: PEDIATRICS

## 2024-04-16 PROCEDURE — 1157F ADVNC CARE PLAN IN RCRD: CPT | Mod: CPTII,S$GLB,, | Performed by: PEDIATRICS

## 2024-04-16 PROCEDURE — 1159F MED LIST DOCD IN RCRD: CPT | Mod: CPTII,S$GLB,, | Performed by: PEDIATRICS

## 2024-04-16 NOTE — PROGRESS NOTES
Subjective     Patient ID: Erendira Pretty is a 78 y.o. male.    Chief Complaint: No chief complaint on file.    Erendira Pretty is a 78 year old male here for his 6 month follow up. Pt also reports lower left sided abdominal/groin pain which is exacerbated by heavy lifting. Pain onset after pt had an angiography and SMA stent placed.    PMHx, PSHx, SocHx, and FHx reviewed and discussed with patient.    1) DM: no hyper/hypoglycemic symptoms. Good checks at home, taking Jardiance 25 mg, 44u Lantus, and Metformin BID.   2) HTN: rare BP checks, B/P good, no HTNive symptoms. On beta blocker for esophogeal varicies  3) LIPIDS: following D&E, statin intolerant/myopathy. Repatha changed to leqvio  4) CAD/Atherosclerosis: no CP, active and sees Dr. Gallardo  5) Hypothyroid: asymptomatic, no swallowing difficulties, no hyper/hypothyroid symptoms. Compliant with levothyroxine.  6) Hepatocellular carcinoma/cirrhosis: followed by Dr. Tomas  7) Asthma/DELONTE/asbestos/obesity: followed by Pulmonary, off ICS due to ENT findings  8) GERD: quiet on PPI  9) BPH: symptoms ok on proscar  10) GI bleed/Anemia: now no further bleeding, counts improving.    LABS REVIEWED AND DISCUSSED WITH PATIENT      Review of Systems   Constitutional:  Negative for chills and fever.   HENT:  Negative for nasal congestion and rhinorrhea.    Respiratory:  Negative for cough and shortness of breath.    Cardiovascular:  Negative for chest pain.   Gastrointestinal:  Positive for abdominal pain. Negative for blood in stool, change in bowel habit, constipation, diarrhea and nausea.   Endocrine: Negative for cold intolerance, heat intolerance, polydipsia, polyphagia and polyuria.   Genitourinary:  Negative for dysuria.   Neurological:  Negative for weakness.          Objective     Physical Exam  Constitutional:       General: He is not in acute distress.     Appearance: Normal appearance. He is normal weight. He is not ill-appearing, toxic-appearing or diaphoretic.    HENT:      Head: Atraumatic.   Cardiovascular:      Rate and Rhythm: Normal rate and regular rhythm.      Pulses: Normal pulses.      Heart sounds: Normal heart sounds. No murmur heard.  Pulmonary:      Effort: Pulmonary effort is normal. No respiratory distress.      Breath sounds: Normal breath sounds. No stridor. No wheezing, rhonchi or rales.   Chest:      Chest wall: No tenderness.   Abdominal:      General: Abdomen is flat. Bowel sounds are normal. There is no distension.      Palpations: Abdomen is soft. There is no mass.      Tenderness: There is no abdominal tenderness. There is no guarding.      Comments: Inspection of left groin shows no hernias, masses, or tenderness.   Neurological:      General: No focal deficit present.      Mental Status: He is alert and oriented to person, place, and time. Mental status is at baseline.      Cranial Nerves: No cranial nerve deficit.      Sensory: No sensory deficit.      Motor: No weakness.      Gait: Gait normal.   Psychiatric:         Mood and Affect: Mood normal.         Behavior: Behavior normal.         Thought Content: Thought content normal.         Judgment: Judgment normal.            Assessment and Plan     1. Essential hypertension    2. Acquired hypothyroidism  -     TSH; Future; Expected date: 04/16/2024    3. Coronary artery disease involving native coronary artery of native heart without angina pectoris  Overview:  40% lesion 2002;[facility]azo      4. PVD (peripheral vascular disease)    5. Benign prostatic hyperplasia with nocturia    6. DELONTE on CPAP  Overview:   CPAP 11 cm with optimal control AHI: 2.6  Nasal wisp mask  HME: [facility]         7. Mild intermittent asthma without complication  Overview:  Symbicort 160 mcg 2 puffs twice daily  Albuterol inhaler       8. Nonrheumatic aortic valve stenosis    9. Mixed hyperlipidemia    10. S/P CABG x 2    11. Gastroesophageal reflux disease with esophagitis without hemorrhage    12. HCC (hepatocellular  carcinoma)    13. Paroxysmal atrial fibrillation    14. Type 2 diabetes mellitus with microalbuminuria, with long-term current use of insulin  -     Comprehensive Metabolic Panel; Future; Expected date: 04/16/2024  -     Lipid Panel; Future; Expected date: 04/16/2024  -     Hemoglobin A1C; Future; Expected date: 04/16/2024  -     Microalbumin/Creatinine Ratio, Urine; Future; Expected date: 04/16/2024    15. Bilateral carotid artery stenosis  Overview:  US CAROTID BILATERAL 07/14/2021  IMPRESSION: Atherosclerosis with suspected stenosis greater than 50% at the right proximal ICA visually. Velocities are discordant and appear improved from prior. Atherosclerosis on the left with no evidence of flow-limiting stenosis greater than 50%.   FINDINGS: Right: Internal Carotid Artery (ICA): Peak systolic velocity 118 cm/sec. End diastolic velocity 35 cm/sec. ICA/CCA peak systolic ratio: 1.4. ICA/CCA end diastolic ratio: 1.7. Plaque formation: Homogeneous plaque again noted with greater than 50% luminal narrowing visually. Vertebral artery: Antegrade flow and normal waveform. Left: Internal Carotid Artery (ICA): Peak systolic velocity 85 cm/sec. End diastolic velocity 17 cm/sec. ICA/CCA peak systolic ratio: 1.0. ICA/CCA end diastolic ratio: 0.7. Plaque formation: Homogeneous. Vertebral artery: Antegrade flow and normal waveform.      16. Statin myopathy    17. Anticoagulant causing adverse effect in therapeutic use, sequela    18. Class 2 severe obesity due to excess calories with serious comorbidity and body mass index (BMI) of 37.0 to 37.9 in adult    19. Anemia, unspecified type  -     CBC Auto Differential; Future; Expected date: 04/16/2024    20. Left groin pain  -     US Soft Tissue, Lower Extremity, Left; Future; Expected date: 04/16/2024        At goal for B/P, lipids, and A1c. TSH slightly high, increase levothyroxine to a total 350 mcg per day. Repeat TSH in 6 weeks. Left groin US. Maintain meds, self monitoring D&E,  weight moderation. F/U 6 months with labs.    Visit today included increased complexity associated with the care of the episodic problem  addressed and managing the longitudinal care of the patient due to the serious and/or complex managed problem(s) .             Follow up in about 6 months (around 10/16/2024).

## 2024-04-26 ENCOUNTER — OFFICE VISIT (OUTPATIENT)
Dept: PAIN MEDICINE | Facility: CLINIC | Age: 79
End: 2024-04-26
Payer: MEDICARE

## 2024-04-26 DIAGNOSIS — R20.0 LEFT LEG NUMBNESS: ICD-10-CM

## 2024-04-26 PROCEDURE — 99499 UNLISTED E&M SERVICE: CPT | Mod: S$GLB,,, | Performed by: ANESTHESIOLOGY

## 2024-04-26 PROCEDURE — 99999 PR PBB SHADOW E&M-EST. PATIENT-LVL I: CPT | Mod: PBBFAC,,, | Performed by: ANESTHESIOLOGY

## 2024-04-28 ENCOUNTER — HOSPITAL ENCOUNTER (OUTPATIENT)
Facility: HOSPITAL | Age: 79
Discharge: HOME | End: 2024-04-30
Attending: EMERGENCY MEDICINE | Admitting: HOSPITALIST
Payer: MEDICARE

## 2024-04-28 DIAGNOSIS — D64.9 ANEMIA, UNSPECIFIED TYPE: Primary | ICD-10-CM

## 2024-04-28 DIAGNOSIS — R07.9 CHEST PAIN: ICD-10-CM

## 2024-04-28 DIAGNOSIS — K21.9 GASTROESOPHAGEAL REFLUX DISEASE WITHOUT ESOPHAGITIS: ICD-10-CM

## 2024-04-28 DIAGNOSIS — I48.91 A-FIB: ICD-10-CM

## 2024-04-28 DIAGNOSIS — K92.2 UGI BLEED: ICD-10-CM

## 2024-04-28 LAB
ABO + RH BLD: NORMAL
ALBUMIN SERPL BCP-MCNC: 3.5 G/DL (ref 3.5–5.2)
ALP SERPL-CCNC: 69 U/L (ref 55–135)
ALT SERPL W/O P-5'-P-CCNC: 11 U/L (ref 10–44)
ANION GAP SERPL CALC-SCNC: 7 MMOL/L (ref 8–16)
AST SERPL-CCNC: 17 U/L (ref 10–40)
BASOPHILS # BLD AUTO: 0.03 K/UL (ref 0–0.2)
BASOPHILS # BLD AUTO: 0.03 K/UL (ref 0–0.2)
BASOPHILS NFR BLD: 0.9 % (ref 0–1.9)
BASOPHILS NFR BLD: 0.9 % (ref 0–1.9)
BILIRUB SERPL-MCNC: 0.3 MG/DL (ref 0.1–1)
BLD GP AB SCN CELLS X3 SERPL QL: NORMAL
BUN SERPL-MCNC: 21 MG/DL (ref 8–23)
CALCIUM SERPL-MCNC: 9 MG/DL (ref 8.7–10.5)
CHLORIDE SERPL-SCNC: 107 MMOL/L (ref 95–110)
CO2 SERPL-SCNC: 24 MMOL/L (ref 23–29)
CREAT SERPL-MCNC: 1.2 MG/DL (ref 0.5–1.4)
DIFFERENTIAL METHOD BLD: ABNORMAL
DIFFERENTIAL METHOD BLD: ABNORMAL
EOSINOPHIL # BLD AUTO: 0.1 K/UL (ref 0–0.5)
EOSINOPHIL # BLD AUTO: 0.1 K/UL (ref 0–0.5)
EOSINOPHIL NFR BLD: 1.8 % (ref 0–8)
EOSINOPHIL NFR BLD: 2.3 % (ref 0–8)
ERYTHROCYTE [DISTWIDTH] IN BLOOD BY AUTOMATED COUNT: 24.6 % (ref 11.5–14.5)
ERYTHROCYTE [DISTWIDTH] IN BLOOD BY AUTOMATED COUNT: 24.8 % (ref 11.5–14.5)
EST. GFR  (NO RACE VARIABLE): >60 ML/MIN/1.73 M^2
GLUCOSE SERPL-MCNC: 158 MG/DL (ref 70–110)
HCT VFR BLD AUTO: 29 % (ref 40–54)
HCT VFR BLD AUTO: 30.5 % (ref 40–54)
HGB BLD-MCNC: 9.4 G/DL (ref 14–18)
HGB BLD-MCNC: 9.6 G/DL (ref 14–18)
IMM GRANULOCYTES # BLD AUTO: 0.01 K/UL (ref 0–0.04)
IMM GRANULOCYTES # BLD AUTO: 0.01 K/UL (ref 0–0.04)
IMM GRANULOCYTES NFR BLD AUTO: 0.3 % (ref 0–0.5)
IMM GRANULOCYTES NFR BLD AUTO: 0.3 % (ref 0–0.5)
LYMPHOCYTES # BLD AUTO: 0.7 K/UL (ref 1–4.8)
LYMPHOCYTES # BLD AUTO: 1 K/UL (ref 1–4.8)
LYMPHOCYTES NFR BLD: 21.4 % (ref 18–48)
LYMPHOCYTES NFR BLD: 27.5 % (ref 18–48)
MCH RBC QN AUTO: 27.7 PG (ref 27–31)
MCH RBC QN AUTO: 27.9 PG (ref 27–31)
MCHC RBC AUTO-ENTMCNC: 31.5 G/DL (ref 32–36)
MCHC RBC AUTO-ENTMCNC: 32.4 G/DL (ref 32–36)
MCV RBC AUTO: 86 FL (ref 82–98)
MCV RBC AUTO: 88 FL (ref 82–98)
MONOCYTES # BLD AUTO: 0.4 K/UL (ref 0.3–1)
MONOCYTES # BLD AUTO: 0.5 K/UL (ref 0.3–1)
MONOCYTES NFR BLD: 12 % (ref 4–15)
MONOCYTES NFR BLD: 13 % (ref 4–15)
NEUTROPHILS # BLD AUTO: 1.9 K/UL (ref 1.8–7.7)
NEUTROPHILS # BLD AUTO: 2.2 K/UL (ref 1.8–7.7)
NEUTROPHILS NFR BLD: 56 % (ref 38–73)
NEUTROPHILS NFR BLD: 63.6 % (ref 38–73)
NRBC BLD-RTO: 0 /100 WBC
NRBC BLD-RTO: 0 /100 WBC
OB PNL STL: POSITIVE
OHS QRS DURATION: 94 MS
OHS QTC CALCULATION: 438 MS
PLATELET # BLD AUTO: 168 K/UL (ref 150–450)
PLATELET # BLD AUTO: 173 K/UL (ref 150–450)
PMV BLD AUTO: 9.1 FL (ref 9.2–12.9)
PMV BLD AUTO: 9.3 FL (ref 9.2–12.9)
POCT GLUCOSE: 89 MG/DL (ref 70–110)
POTASSIUM SERPL-SCNC: 4 MMOL/L (ref 3.5–5.1)
PROT SERPL-MCNC: 6.7 G/DL (ref 6–8.4)
RBC # BLD AUTO: 3.37 M/UL (ref 4.6–6.2)
RBC # BLD AUTO: 3.47 M/UL (ref 4.6–6.2)
SODIUM SERPL-SCNC: 138 MMOL/L (ref 136–145)
SPECIMEN OUTDATE: NORMAL
WBC # BLD AUTO: 3.41 K/UL (ref 3.9–12.7)
WBC # BLD AUTO: 3.45 K/UL (ref 3.9–12.7)

## 2024-04-28 PROCEDURE — 99214 OFFICE O/P EST MOD 30 MIN: CPT | Mod: ,,, | Performed by: INTERNAL MEDICINE

## 2024-04-28 PROCEDURE — 93010 ELECTROCARDIOGRAM REPORT: CPT | Mod: ,,, | Performed by: INTERNAL MEDICINE

## 2024-04-28 PROCEDURE — 96376 TX/PRO/DX INJ SAME DRUG ADON: CPT

## 2024-04-28 PROCEDURE — 93005 ELECTROCARDIOGRAM TRACING: CPT

## 2024-04-28 PROCEDURE — 96374 THER/PROPH/DIAG INJ IV PUSH: CPT

## 2024-04-28 PROCEDURE — 99900035 HC TECH TIME PER 15 MIN (STAT)

## 2024-04-28 PROCEDURE — C9113 INJ PANTOPRAZOLE SODIUM, VIA: HCPCS | Performed by: NURSE PRACTITIONER

## 2024-04-28 PROCEDURE — 86901 BLOOD TYPING SEROLOGIC RH(D): CPT | Performed by: REGISTERED NURSE

## 2024-04-28 PROCEDURE — 99285 EMERGENCY DEPT VISIT HI MDM: CPT | Mod: 25

## 2024-04-28 PROCEDURE — 82272 OCCULT BLD FECES 1-3 TESTS: CPT | Performed by: EMERGENCY MEDICINE

## 2024-04-28 PROCEDURE — G0378 HOSPITAL OBSERVATION PER HR: HCPCS

## 2024-04-28 PROCEDURE — 63600175 PHARM REV CODE 636 W HCPCS: Performed by: NURSE PRACTITIONER

## 2024-04-28 PROCEDURE — 36415 COLL VENOUS BLD VENIPUNCTURE: CPT | Performed by: NURSE PRACTITIONER

## 2024-04-28 PROCEDURE — 85025 COMPLETE CBC W/AUTO DIFF WBC: CPT | Mod: 91 | Performed by: NURSE PRACTITIONER

## 2024-04-28 PROCEDURE — 25000003 PHARM REV CODE 250: Performed by: NURSE PRACTITIONER

## 2024-04-28 PROCEDURE — 80053 COMPREHEN METABOLIC PANEL: CPT | Performed by: REGISTERED NURSE

## 2024-04-28 PROCEDURE — 85025 COMPLETE CBC W/AUTO DIFF WBC: CPT | Performed by: REGISTERED NURSE

## 2024-04-28 RX ORDER — FINASTERIDE 5 MG/1
5 TABLET, FILM COATED ORAL NIGHTLY
Status: DISCONTINUED | OUTPATIENT
Start: 2024-04-28 | End: 2024-04-30 | Stop reason: HOSPADM

## 2024-04-28 RX ORDER — CARVEDILOL 12.5 MG/1
12.5 TABLET ORAL 2 TIMES DAILY WITH MEALS
Status: DISCONTINUED | OUTPATIENT
Start: 2024-04-28 | End: 2024-04-30 | Stop reason: HOSPADM

## 2024-04-28 RX ORDER — PROPAFENONE HYDROCHLORIDE 150 MG/1
150 TABLET, COATED ORAL EVERY 8 HOURS
Status: DISCONTINUED | OUTPATIENT
Start: 2024-04-28 | End: 2024-04-30 | Stop reason: HOSPADM

## 2024-04-28 RX ORDER — ONDANSETRON HYDROCHLORIDE 2 MG/ML
4 INJECTION, SOLUTION INTRAVENOUS EVERY 8 HOURS PRN
Status: DISCONTINUED | OUTPATIENT
Start: 2024-04-28 | End: 2024-04-30 | Stop reason: HOSPADM

## 2024-04-28 RX ORDER — PANTOPRAZOLE SODIUM 40 MG/10ML
40 INJECTION, POWDER, LYOPHILIZED, FOR SOLUTION INTRAVENOUS 2 TIMES DAILY
Status: DISCONTINUED | OUTPATIENT
Start: 2024-04-28 | End: 2024-04-30 | Stop reason: HOSPADM

## 2024-04-28 RX ORDER — GLUCAGON 1 MG
1 KIT INJECTION
Status: DISCONTINUED | OUTPATIENT
Start: 2024-04-28 | End: 2024-04-30 | Stop reason: HOSPADM

## 2024-04-28 RX ORDER — INSULIN ASPART 100 [IU]/ML
0-5 INJECTION, SOLUTION INTRAVENOUS; SUBCUTANEOUS
Status: DISCONTINUED | OUTPATIENT
Start: 2024-04-28 | End: 2024-04-30 | Stop reason: HOSPADM

## 2024-04-28 RX ORDER — SODIUM CHLORIDE 0.9 % (FLUSH) 0.9 %
10 SYRINGE (ML) INJECTION EVERY 12 HOURS PRN
Status: DISCONTINUED | OUTPATIENT
Start: 2024-04-28 | End: 2024-04-30 | Stop reason: HOSPADM

## 2024-04-28 RX ORDER — IBUPROFEN 200 MG
16 TABLET ORAL
Status: DISCONTINUED | OUTPATIENT
Start: 2024-04-28 | End: 2024-04-30 | Stop reason: HOSPADM

## 2024-04-28 RX ORDER — LEVOTHYROXINE SODIUM 150 UG/1
300 TABLET ORAL
Status: DISCONTINUED | OUTPATIENT
Start: 2024-04-29 | End: 2024-04-30 | Stop reason: HOSPADM

## 2024-04-28 RX ORDER — IBUPROFEN 200 MG
24 TABLET ORAL
Status: DISCONTINUED | OUTPATIENT
Start: 2024-04-28 | End: 2024-04-30 | Stop reason: HOSPADM

## 2024-04-28 RX ORDER — NALOXONE HCL 0.4 MG/ML
0.02 VIAL (ML) INJECTION
Status: DISCONTINUED | OUTPATIENT
Start: 2024-04-28 | End: 2024-04-30 | Stop reason: HOSPADM

## 2024-04-28 RX ORDER — RANOLAZINE 500 MG/1
1000 TABLET, EXTENDED RELEASE ORAL 2 TIMES DAILY
Status: DISCONTINUED | OUTPATIENT
Start: 2024-04-28 | End: 2024-04-30 | Stop reason: HOSPADM

## 2024-04-28 RX ADMIN — RANOLAZINE 1000 MG: 500 TABLET, EXTENDED RELEASE ORAL at 09:04

## 2024-04-28 RX ADMIN — PANTOPRAZOLE SODIUM 40 MG: 40 INJECTION, POWDER, FOR SOLUTION INTRAVENOUS at 09:04

## 2024-04-28 RX ADMIN — PANTOPRAZOLE SODIUM 40 MG: 40 INJECTION, POWDER, FOR SOLUTION INTRAVENOUS at 02:04

## 2024-04-28 RX ADMIN — FINASTERIDE 5 MG: 5 TABLET, FILM COATED ORAL at 09:04

## 2024-04-28 RX ADMIN — PROPAFENONE HYDROCHLORIDE 150 MG: 150 TABLET, FILM COATED ORAL at 11:04

## 2024-04-28 RX ADMIN — CARVEDILOL 12.5 MG: 12.5 TABLET, FILM COATED ORAL at 05:04

## 2024-04-28 NOTE — H&P
"  [facility] - Emergency Dept.  [facility] Medicine  History & Physical    Patient Name: Erendira Pretty  MRN: [id number]  Patient Class: OP- Observation  Admission Date: 4/28/2024  Attending Physician: Trino Cordova MD   Primary Care Provider: Bhupinder Callaway MD         Patient information was obtained from patient, past medical records, and ER records.     Subjective:     Principal Problem:Melena    Chief Complaint:   Chief Complaint   Patient presents with    Rectal Bleeding     Pt reports hx multiple GI bleeds, c/o dark black stool starting this AM, denies weakness/pain at present        HPI: Mr. Pretty is a 78 year old male with a history of CAD s/p CABG, hepatocellular carcinoma, esophageal varices, DM, HTN, GERD, PAF (on xarelto), SMA stenting 01/05/2024,  and blaise on cpap who presents to the ED for evaluation of two episodes of blood in stool described as "black and oily" started today. Pt is on xarelto (last dose 4/27) and baby aspirin (last dose 4/28) but was taken off Plavix.  Patient denies any BRBPR, abdominal pain, and hematemesis.  Lab work in the ED notable for hgb 9.4, stool OCB +.  Case discussed with GI and ED, recommended PPI IV BID and NPO 0000 for scope.  Patient will be admitted to observation for UGI bleed.    Of note, EGD (01/17/24): Grade I esophageal varices without bleeding and gastritis.     Code Status Full  Surrogate Decision Maker Henrry willis     Past Medical History:   Diagnosis Date    Anticoagulant long-term use     Aortic stenosis     Asthma     Benign prostatic hyperplasia with nocturia     Bilateral carotid artery stenosis 07/21/2021    US CAROTID BILATERAL 07/14/2021 IMPRESSION: Atherosclerosis with suspected stenosis greater than 50% at the right proximal ICA visually. Velocities are discordant and appear improved from prior. Atherosclerosis on the left with no evidence of flow-limiting stenosis greater than 50%.  FINDINGS: Right: Internal Carotid Artery (ICA): Peak systolic velocity " 118 cm/sec. 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 09/22/2020    DELONTE on CPAP     Osteoarthritis     Paroxysmal atrial fibrillation     PVD (peripheral vascular disease)     Secondary esophageal varices without bleeding 06/26/2017    Type 2 diabetes mellitus        Past Surgical History:   Procedure Laterality Date    ANGIOGRAM, EXTREMITY, UNILATERAL Left 1/4/2024    Procedure: ANGIOGRAM, EXTREMITY, UNILATERAL- Femoral / SMS Stent, Poss General;  Surgeon: ALEX Alfred III, MD;  Location: [facility] OR [address] [place] [address];  Service: Vascular;  Laterality: Left;  mGy : 2698.78  Gy.cm : 229.88  Contrast : 62ml  Fluro time : 13.8min    CARDIAC CATHETERIZATION      CARDIAC SURGERY      CARPAL TUNNEL RELEASE Left     CATARACT EXTRACTION      CATARACT EXTRACTION W/ INTRAOCULAR LENS  IMPLANT, BILATERAL  2008    CATHETERIZATION OF BOTH LEFT AND RIGHT HEART N/A 11/2/2018    Procedure: CATHETERIZATION, HEART, BOTH LEFT AND RIGHT;  Surgeon: Frank Lazo MD;  Location: [facility] CATH LAB;  Service: Cardiology;  Laterality: N/A;    COLONOSCOPY  8/2013    COLONOSCOPY N/A 5/30/2016    Procedure: COLONOSCOPY;  Surgeon: Anna Tomas MD;  Location: [facility];  Service: Endoscopy;  Laterality: N/A;    COLONOSCOPY N/A 7/17/2019    Procedure: COLONOSCOPY;  Surgeon: David Carter III, MD;  Location: [facility];  Service: Endoscopy;  Laterality: N/A;    COLONOSCOPY N/A 12/14/2023    Procedure: COLONOSCOPY;  Surgeon: Ama Barrera MD;  Location: [facility];   Service: Endoscopy;  Laterality: N/A;    COMPUTED TOMOGRAPHY N/A 9/9/2019    Procedure: CT (COMPUTED TOMOGRAPHY);  Surgeon: Sri Diagnostic Provider;  Location: [facility] OR;  Service: General;  Laterality: N/A;  Microwave ablation to be done in CT.  Need CRNA and cart    COMPUTED TOMOGRAPHY N/A 1/25/2022    Procedure: Liver Microwave Ablation;  Surgeon: Red Puentes MD;  Location: [facility];  Service: General;  Laterality: N/A;    CORONARY ARTERY BYPASS GRAFT (CABG) N/A 11/5/2018    Procedure: CORONARY ARTERY BYPASS GRAFT (CABG);  Surgeon: Bear De Luna MD;  Location: [facility];  Service: Cardiothoracic;  Laterality: N/A;  TWO VESSEL BYPASS WITH AORTOTOMY AND EXCISION OF ATHEROSCLEROTIC PLAQUE    CORONARY BYPASS GRAFT ANGIOGRAPHY  3/2/2020    Procedure: Bypass graft study;  Surgeon: Cora Cormier MD;  Location: [facility] CATH LAB;  Service: Cardiology;;    ELBOW BURSA SURGERY Right 2010    ENDOSCOPIC HARVEST OF VEIN Left 11/5/2018    Procedure: SURGICAL PROCUREMENT, VEIN, ENDOSCOPIC;  Surgeon: Bear eD Luna MD;  Location: [facility];  Service: Cardiothoracic;  Laterality: Left;    ESOPHAGOGASTRODUODENOSCOPY N/A 7/17/2019    Procedure: ESOPHAGOGASTRODUODENOSCOPY (EGD);  Surgeon: David Carter III, MD;  Location: [facility];  Service: Endoscopy;  Laterality: N/A;    ESOPHAGOGASTRODUODENOSCOPY N/A 12/29/2022    Procedure: ESOPHAGOGASTRODUODENOSCOPY (EGD);  Surgeon: Issa Gayle MD;  Location: [facility];  Service: Endoscopy;  Laterality: N/A;    ESOPHAGOGASTRODUODENOSCOPY N/A 1/17/2024    Procedure: EGD (ESOPHAGOGASTRODUODENOSCOPY);  Surgeon: Issa Gayle MD;  Location: [facility];  Service: Endoscopy;  Laterality: N/A;    ETHMOIDECTOMY Bilateral 3/27/2019    Procedure: ETHMOIDECTOMY;  Surgeon: Alejandro Parkinson MD;  Location: [facility];  Service: ENT;  Laterality: Bilateral;    EYE SURGERY      FOOT SURGERY      FRONTAL SINUS OBLITERATION Bilateral 3/27/2019    Procedure: SINUSOTOMY, FRONTAL SINUS,  OBLITERATIVE;  Surgeon: Alejandro Parkinson MD;  Location: [facility] OR;  Service: ENT;  Laterality: Bilateral;    FUNCTIONAL ENDOSCOPIC SINUS SURGERY (FESS) Bilateral 3/27/2019    Procedure: FESS (FUNCTIONAL ENDOSCOPIC SINUS SURGERY);  Surgeon: Alejandro Parkinson MD;  Location: [facility] OR;  Service: ENT;  Laterality: Bilateral;  Left Keila bullosa resection     INTRALUMINAL GASTROINTESTINAL TRACT IMAGING VIA CAPSULE N/A 2/2/2024    Procedure: IMAGING PROCEDURE, GI TRACT, INTRALUMINAL, VIA CAPSULE;  Surgeon: Cooper Estrella RN;  Location: [facility] ENDO;  Service: Endoscopy;  Laterality: N/A;    KNEE ARTHROSCOPY W/ MENISCAL REPAIR Left 06/2019    dr boykin    KNEE SURGERY Right     LEFT HEART CATHETERIZATION Left 3/2/2020    Procedure: CATHETERIZATION, HEART, LEFT;  Surgeon: Cora Cormier MD;  Location: [facility] CATH LAB;  Service: Cardiology;  Laterality: Left;    LIVER BIOPSY  01/2022    MAXILLARY ANTROSTOMY Bilateral 3/27/2019    Procedure: MAXILLARY ANTROSTOMY;  Surgeon: Alejandro Parkinson MD;  Location: [facility] OR;  Service: ENT;  Laterality: Bilateral;    NASAL SEPTOPLASTY N/A 3/27/2019    Procedure: SEPTOPLASTY, NOSE;  Surgeon: Alejandro Parkinson MD;  Location: [facility] OR;  Service: ENT;  Laterality: N/A;    RECTAL SURGERY  2012    STENT, SUPERIOR MESENTERIC ARTERY Left 1/4/2024    Procedure: STENT, SUPERIOR MESENTERIC ARTERY;  Surgeon: ALEX Alfred III, MD;  Location: [address];  Service: Vascular;  Laterality: Left;    TRANSURETHRAL RESECTION OF PROSTATE (TURP) WITHOUT USE OF LASER N/A 6/19/2018    Procedure: TURP, WITHOUT USING LASER;  Surgeon: Cooper Cordova IV, MD;  Location: [facility] OR;  Service: Urology;  Laterality: N/A;    TRIGGER FINGER RELEASE Left        Review of patient's allergies indicates:   Allergen Reactions    Lipitor [atorvastatin] Other (See Comments)     Muscle aches and pains as well as fatigue.     Niacin preparations Other (See Comments)     Causes broken blood vessels and bruises at 4 times normal dose.    Statins-hmg-coa  reductase inhibitors Other (See Comments)     Statins cause intolerable myalgias.    Iodinated contrast media Hives     Tachycardia    Omeprazole Hives and Itching    Prilosec [omeprazole magnesium] Hives and Itching       Current Facility-Administered Medications   Medication Dose Route Frequency Provider Last Rate Last Admin    carvediloL tablet 12.5 mg  12.5 mg Oral BID WM Halina Villaseñor, NP        dextrose 10% bolus 125 mL 125 mL  12.5 g Intravenous PRN Halina Villaseñor, NP        dextrose 10% bolus 250 mL 250 mL  25 g Intravenous PRN Halina Villaseñor, KATHY        finasteride tablet 5 mg  5 mg Oral Nightly Halina Villaseñor NP        glucagon (human recombinant) injection 1 mg  1 mg Intramuscular PRN Halina Villaseñor, NP        glucose chewable tablet 16 g  16 g Oral PRN Halina Villaseñor, NP        glucose chewable tablet 24 g  24 g Oral PRN Halina Villaseñor, KATHY        insulin aspart U-100 pen 0-5 Units  0-5 Units Subcutaneous QID (AC + HS) PRN Halina Villaseñor NP        [START ON 4/29/2024] levothyroxine tablet 300 mcg  300 mcg Oral Before breakfast Halina Villaseñor NP        naloxone 0.4 mg/mL injection 0.02 mg  0.02 mg Intravenous PRN Halina Villaseñor NP        ondansetron injection 4 mg  4 mg Intravenous Q8H PRN Halina Villaseñor NP        pantoprazole injection 40 mg  40 mg Intravenous BID Halina Villaseñor NP   40 mg at 04/28/24 1429    propafenone tablet 150 mg  150 mg Oral Q8H Halina Villaseñor NP        ranolazine 12 hr tablet 1,000 mg  1,000 mg Oral BID Halina Villaseñor NP        sodium chloride 0.9% flush 10 mL  10 mL Intravenous Q12H PRN Halina Villaseñor NP         Current Outpatient Medications   Medication Sig Dispense Refill    aspirin (ECOTRIN) 81 MG EC tablet Take 1 tablet (81 mg total) by mouth once daily. 90 tablet 3    carvediloL (COREG) 12.5 MG tablet Take 1 tablet (12.5 mg total) by mouth 2 (two) times daily with meals. 60 tablet 2     finasteride (PROSCAR) 5 mg tablet TAKE 1 TABLET BY MOUTH once daily (Patient taking differently: Take 5 mg by mouth nightly.) 90 tablet 2    furosemide (LASIX) 40 MG tablet Take 1 tablet (40 mg total) by mouth 2 (two) times daily. 60 tablet 11    insulin (LANTUS SOLOSTAR U-100 INSULIN) glargine 100 units/mL SubQ pen Inject 44 Units into the skin every evening. 45 mL 1    JARDIANCE 25 mg tablet TAKE 1 TABLET BY MOUTH EVERY DAY 90 tablet 0    levocetirizine (XYZAL) 5 MG tablet Take 1 tablet (5 mg total) by mouth every evening. 30 tablet 11    levothyroxine (SYNTHROID) 300 MCG Tab TAKE 1 TABLET BY MOUTH EVERY MORNING 90 tablet 3    levothyroxine (SYNTHROID) 50 MCG tablet Take 1 tablet (50 mcg total) by mouth before breakfast. 90 tablet 3    lisinopriL 10 MG tablet TAKE 1 BY MOUTH EVERY DAY 30 tablet 7    metFORMIN (GLUCOPHAGE-XR) 500 MG ER 24hr tablet Take 1 tablet (500 mg total) by mouth 2 (two) times daily with meals. 180 tablet 3    mirabegron (MYRBETRIQ) 25 mg Tb24 ER tablet Take 1 tablet (25 mg total) by mouth once daily. 30 tablet 11    montelukast (SINGULAIR) 10 mg tablet Take 1 tablet (10 mg total) by mouth once daily. 90 tablet 3    propafenone (RHTHYMOL) 150 MG Tab Take 1 tablet (150 mg total) by mouth every 8 (eight) hours. 90 tablet 11    RABEprazole (ACIPHEX) 20 mg tablet Take 1 tablet (20 mg total) by mouth 2 (two) times daily. 180 tablet 3    ranolazine (RANEXA) 1,000 mg Tb12 Take 1 tablet (1,000 mg total) by mouth 2 (two) times daily. 60 tablet 2    rivaroxaban (XARELTO) 20 mg Tab Take 1 tablet (20 mg total) by mouth daily with dinner or evening meal. 30 tablet 11    ACCU-CHEK GRACE PLUS METER Misc AS DIRECTED 1 each 0    albuterol (VENTOLIN HFA) 90 mcg/actuation inhaler Inhale 2 puffs into the lungs every 6 (six) hours as needed for Wheezing or Shortness of Breath. Rescue 18 g 3    amLODIPine (NORVASC) 10 MG tablet TAKE 1 TABLET BY MOUTH ONCE A DAY (DOSE INCREASE) (Patient not taking: Reported on  "2024) 30 tablet 11    blood sugar diagnostic (ACCU-CHEK GRACE PLUS TEST STRP) Strp TEST THREE TIMES A  strip 11    budesonide-formoterol 160-4.5 mcg (SYMBICORT) 160-4.5 mcg/actuation HFAA INHALE 2 PUFFS INTO THE LUNGS TWO TIMES A DAY. 10.2 g 11    GLUCOSAMINE HCL/CHONDR ROSARIO A NA (OSTEO BI-FLEX ORAL) Take 1 tablet by mouth 2 (two) times daily.       lancets (ACCU-CHEK FASTCLIX LANCET DRUM) Misc TEST TWO TIMES A  each 5    pen needle, diabetic (BD ULTRA-FINE MINI PEN NEEDLE) 31 gauge x 3/16" Ndle USE AS DIRECTED 100 each 11    sildenafiL (VIAGRA) 100 MG tablet Take 1/2 to 1 tablet by mouth one hour prior to intercourse. Max 100mg per day 30 tablet 11     Facility-Administered Medications Ordered in Other Encounters   Medication Dose Route Frequency Provider Last Rate Last Admin    lactated ringers infusion   Intravenous Continuous Omaira Theodore MD   New Bag at 19 1117     Family History       Problem Relation (Age of Onset)    Heart disease Mother, Father, Brother          Tobacco Use    Smoking status: Former     Current packs/day: 0.00     Average packs/day: 0.5 packs/day for 4.0 years (2.0 ttl pk-yrs)     Types: Cigarettes     Start date: 1968     Quit date: 1972     Years since quittin.9    Smokeless tobacco: Former     Types: Chew     Quit date: 1976   Substance and Sexual Activity    Alcohol use: Yes     Alcohol/week: 2.0 standard drinks of alcohol     Types: 2 Cans of beer per week    Drug use: No    Sexual activity: Yes     Partners: Female     Review of Systems   Gastrointestinal:         Dark stools     Objective:     Vital Signs (Most Recent):  Temp: 98.1 °F (36.7 °C) (24 1138)  Pulse: 70 (24 1417)  Resp: 16 (24 1417)  BP: 130/63 (24 1417)  SpO2: 100 % (24 1417) Vital Signs (24h Range):  Temp:  [98.1 °F (36.7 °C)] 98.1 °F (36.7 °C)  Pulse:  [70-76] 70  Resp:  [14-18] 16  SpO2:  [97 %-100 %] 100 %  BP: (115-134)/(58-63) " 130/63     Weight: 98 kg (216 lb 0.8 oz)  Body mass index is 33.84 kg/m².     Physical Exam  Vitals and nursing note reviewed.   Constitutional:       Appearance: Normal appearance.   Cardiovascular:      Rate and Rhythm: Normal rate and regular rhythm.      Pulses: Normal pulses.      Heart sounds: Normal heart sounds.   Pulmonary:      Effort: Pulmonary effort is normal.      Breath sounds: Normal breath sounds. No wheezing.   Abdominal:      General: Bowel sounds are normal. There is no distension.      Palpations: Abdomen is soft.      Tenderness: There is no abdominal tenderness.   Musculoskeletal:         General: Normal range of motion.   Skin:     General: Skin is warm and dry.   Neurological:      Mental Status: He is alert and oriented to person, place, and time.                Significant Labs: All pertinent labs within the past 24 hours have been reviewed.    Significant Imaging: I have reviewed all pertinent imaging results/findings within the past 24 hours.  Assessment/Plan:     * Melena  - hx of esophageal varices; EGD (01/17/24): Grade I esophageal varices without bleeding and gastritis.   - hgb on 4/9 11, today 9.4  - Stool OCB +   - GI consulted  - PPI IV BID and NPO 0000 for possible scope  - trend hgb           Paroxysmal atrial fibrillation  Patient with Paroxysmal (<7 days) atrial fibrillation which is controlled currently with Beta Blocker and Rythmol . Patient is currently in sinus rhythm.[id number] Score: 4. Anticoagulation indicated. Anticoagulation done with xarelto .    - follows with Dr. Gallardo and Dr. Dede Enriquez, per chart review planning on ablation on 6/24  - holding home xarelto d/t possible GI bleed    Type 2 diabetes mellitus with microalbuminuria, with long-term current use of insulin  - SSI and BG monitoring   - holding jardiance and metformin         DELONTE on CPAP  - continue cpap nightly         Benign prostatic hyperplasia with nocturia  - continue home finasteride  Isotretinoin Pregnancy And Lactation Text: This medication is Pregnancy Category X and is considered extremely dangerous during pregnancy. It is unknown if it is excreted in breast milk.       Coronary artery disease involving native coronary artery of native heart without angina pectoris  Patient with known CAD s/p CABG, which is controlled Will continue ASA and Statin and monitor for S/Sx of angina/ACS. Continue to monitor on telemetry.   - holding asa d/t possible UGI bleed      Essential hypertension  - continue home bb, holding home lasix and lisinopril  - trend bp and adjust meds as necessary       VTE Risk Mitigation (From admission, onward)           Ordered     Reason for No Pharmacological VTE Prophylaxis  Once        Question:  Reasons:  Answer:  Physician Provided (leave comment)  Comment:  UGI bleed    04/28/24 1411     IP VTE HIGH RISK PATIENT  Once         04/28/24 1411     Place sequential compression device  Until discontinued         04/28/24 1411                         On 04/28/2024, patient should be placed in hospital observation services under my care in collaboration with Dr. Cordova.           Halina Villaseñor NP  Department of Hospital Medicine  [facility] - Emergency Dept.           SSKI Counseling:  I discussed with the patient the risks of SSKI including but not limited to thyroid abnormalities, metallic taste, GI upset, fever, headache, acne, arthralgias, paraesthesias, lymphadenopathy, easy bleeding, arrhythmias, and allergic reaction. Finasteride Female Counseling: Finasteride Counseling:  I discussed with the patient the risks of use of finasteride including but not limited to decreased libido and sexual dysfunction. Explained the teratogenic nature of the medication and stressed the importance of not getting pregnant during treatment. All of the patient's questions and concerns were addressed. Zoryve Counseling:  I discussed with the patient that Zoryve is not for use in the eyes, mouth or vagina. The most commonly reported side effects include diarrhea, headache, insomnia, application site pain, upper respiratory tract infections, and urinary tract infections.  All of the patient's questions and concerns were addressed. Azelaic Acid Pregnancy And Lactation Text: This medication is considered safe during pregnancy and breast feeding. Erythromycin Counseling:  I discussed with the patient the risks of erythromycin including but not limited to GI upset, allergic reaction, drug rash, diarrhea, increase in liver enzymes, and yeast infections. Sotyktu Pregnancy And Lactation Text: There is insufficient data to evaluate whether or not Sotyktu is safe to use during pregnancy.? ?It is not known if Sotyktu passes into breast milk and whether or not it is safe to use when breastfeeding.?? Rhofade Counseling: Rhofade is a topical medication which can decrease superficial blood flow where applied. Side effects are uncommon and include stinging, redness and allergic reactions. Bactrim Counseling:  I discussed with the patient the risks of sulfa antibiotics including but not limited to GI upset, allergic reaction, drug rash, diarrhea, dizziness, photosensitivity, and yeast infections.  Rarely, more serious reactions can occur including but not limited to aplastic anemia, agranulocytosis, methemoglobinemia, blood dyscrasias, liver or kidney failure, lung infiltrates or desquamative/blistering drug rashes. Siliq Counseling:  I discussed with the patient the risks of Siliq including but not limited to new or worsening depression, suicidal thoughts and behavior, immunosuppression, malignancy, posterior leukoencephalopathy syndrome, and serious infections.  The patient understands that monitoring is required including a PPD at baseline and must alert us or the primary physician if symptoms of infection or other concerning signs are noted. There is also a special program designed to monitor depression which is required with Siliq. Bimzelx Pregnancy And Lactation Text: This medication crosses the placenta and the safety is uncertain during pregnancy. It is unknown if this medication is present in breast milk. Tazorac Pregnancy And Lactation Text: This medication is not safe during pregnancy. It is unknown if this medication is excreted in breast milk. Rifampin Pregnancy And Lactation Text: This medication is Pregnancy Category C and it isn't know if it is safe during pregnancy. It is also excreted in breast milk and should not be used if you are breast feeding. Glycopyrrolate Pregnancy And Lactation Text: This medication is Pregnancy Category B and is considered safe during pregnancy. It is unknown if it is excreted breast milk. Cibinqo Pregnancy And Lactation Text: It is unknown if this medication will adversely affect pregnancy or breast feeding.  You should not take this medication if you are currently pregnant or planning a pregnancy or while breastfeeding. Drysol Counseling:  I discussed with the patient the risks of drysol/aluminum chloride including but not limited to skin rash, itching, irritation, burning. Cyclophosphamide Pregnancy And Lactation Text: This medication is Pregnancy Category D and it isn't considered safe during pregnancy. This medication is excreted in breast milk. Clofazimine Counseling:  I discussed with the patient the risks of clofazimine including but not limited to skin and eye pigmentation, liver damage, nausea/vomiting, gastrointestinal bleeding and allergy. Griseofulvin Counseling:  I discussed with the patient the risks of griseofulvin including but not limited to photosensitivity, cytopenia, liver damage, nausea/vomiting and severe allergy.  The patient understands that this medication is best absorbed when taken with a fatty meal (e.g., ice cream or french fries). Topical Sulfur Applications Counseling: Topical Sulfur Counseling: Patient counseled that this medication may cause skin irritation or allergic reactions.  In the event of skin irritation, the patient was advised to reduce the amount of the drug applied or use it less frequently.   The patient verbalized understanding of the proper use and possible adverse effects of topical sulfur application.  All of the patient's questions and concerns were addressed. Taltz Pregnancy And Lactation Text: The risk during pregnancy and breastfeeding is uncertain with this medication. Erythromycin Pregnancy And Lactation Text: This medication is Pregnancy Category B and is considered safe during pregnancy. It is also excreted in breast milk. Libtayo Pregnancy And Lactation Text: This medication is contraindicated in pregnancy and when breast feeding. Imiquimod Pregnancy And Lactation Text: This medication is Pregnancy Category C. It is unknown if this medication is excreted in breast milk. Olanzapine Counseling- I discussed with the patient the common side effects of olanzapine including but are not limited to: lack of energy, dry mouth, increased appetite, sleepiness, tremor, constipation, dizziness, changes in behavior, or restlessness.  Explained that teenagers are more likely to experience headaches, abdominal pain, pain in the arms or legs, tiredness, and sleepiness.  Serious side effects include but are not limited: increased risk of death in elderly patients who are confused, have memory loss, or dementia-related psychosis; hyperglycemia; increased cholesterol and triglycerides; and weight gain. Finasteride Pregnancy And Lactation Text: This medication is absolutely contraindicated during pregnancy. It is unknown if it is excreted in breast milk. Humira Counseling:  I discussed with the patient the risks of adalimumab including but not limited to myelosuppression, immunosuppression, autoimmune hepatitis, demyelinating diseases, lymphoma, and serious infections.  The patient understands that monitoring is required including a PPD at baseline and must alert us or the primary physician if symptoms of infection or other concerning signs are noted. Include Pregnancy/Lactation Warning?: No Cimzia Counseling:  I discussed with the patient the risks of Cimzia including but not limited to immunosuppression, allergic reactions and infections.  The patient understands that monitoring is required including a PPD at baseline and must alert us or the primary physician if symptoms of infection or other concerning signs are noted. Benzoyl Peroxide Counseling: Patient counseled that medicine may cause skin irritation and bleach clothing.  In the event of skin irritation, the patient was advised to reduce the amount of the drug applied or use it less frequently.   The patient verbalized understanding of the proper use and possible adverse effects of benzoyl peroxide.  All of the patient's questions and concerns were addressed. Xeljanz Counseling: I discussed with the patient the risks of Xeljanz therapy including increased risk of infection, liver issues, headache, diarrhea, or cold symptoms. Live vaccines should be avoided. They were instructed to call if they have any problems. Sski Pregnancy And Lactation Text: This medication is Pregnancy Category D and isn't considered safe during pregnancy. It is excreted in breast milk. High Dose Vitamin A Counseling: Side effects reviewed, pt to contact office should one occur. Zoryve Pregnancy And Lactation Text: It is unknown if this medication can cause problems during pregnancy and breastfeeding. Albendazole Counseling:  I discussed with the patient the risks of albendazole including but not limited to cytopenia, kidney damage, nausea/vomiting and severe allergy.  The patient understands that this medication is being used in an off-label manner. Picato Counseling:  I discussed with the patient the risks of Picato including but not limited to erythema, scaling, itching, weeping, crusting, and pain. Topical Clindamycin Counseling: Patient counseled that this medication may cause skin irritation or allergic reactions.  In the event of skin irritation, the patient was advised to reduce the amount of the drug applied or use it less frequently.   The patient verbalized understanding of the proper use and possible adverse effects of clindamycin.  All of the patient's questions and concerns were addressed. Rhofade Pregnancy And Lactation Text: This medication has not been assigned a Pregnancy Risk Category. It is unknown if the medication is excreted in breast milk. Clofazimine Pregnancy And Lactation Text: This medication is Pregnancy Category C and isn't considered safe during pregnancy. It is excreted in breast milk. Bactrim Pregnancy And Lactation Text: This medication is Pregnancy Category D and is known to cause fetal risk.  It is also excreted in breast milk. Litfulo Counseling: I discussed with the patient the risks of Litfulo therapy including but not limited to upper respiratory tract infections, shingles, cold sores, and nausea. Live vaccines should be avoided.  This medication has been linked to serious infections; higher rate of mortality; malignancy and lymphoproliferative disorders; major adverse cardiovascular events; thrombosis; gastrointestinal perforations; neutropenia; lymphopenia; anemia; liver enzyme elevations; and lipid elevations. Topical Sulfur Applications Pregnancy And Lactation Text: This medication is considered safe during pregnancy and breast feeding secondary to limited systemic absorption. Griseofulvin Pregnancy And Lactation Text: This medication is Pregnancy Category X and is known to cause serious birth defects. It is unknown if this medication is excreted in breast milk but breast feeding should be avoided. Cyclosporine Counseling:  I discussed with the patient the risks of cyclosporine including but not limited to hypertension, gingival hyperplasia,myelosuppression, immunosuppression, liver damage, kidney damage, neurotoxicity, lymphoma, and serious infections. The patient understands that monitoring is required including baseline blood pressure, CBC, CMP, lipid panel and uric acid, and then 1-2 times monthly CMP and blood pressure. Hydroxychloroquine Counseling:  I discussed with the patient that a baseline ophthalmologic exam is needed at the start of therapy and every year thereafter while on therapy. A CBC may also be warranted for monitoring.  The side effects of this medication were discussed with the patient, including but not limited to agranulocytosis, aplastic anemia, seizures, rashes, retinopathy, and liver toxicity. Patient instructed to call the office should any adverse effect occur.  The patient verbalized understanding of the proper use and possible adverse effects of Plaquenil.  All the patient's questions and concerns were addressed. Klisyri Counseling:  I discussed with the patient the risks of Klisyri including but not limited to erythema, scaling, itching, weeping, crusting, and pain. Metronidazole Counseling:  I discussed with the patient the risks of metronidazole including but not limited to seizures, nausea/vomiting, a metallic taste in the mouth, nausea/vomiting and severe allergy. Benzoyl Peroxide Pregnancy And Lactation Text: This medication is Pregnancy Category C. It is unknown if benzoyl peroxide is excreted in breast milk. Zyclara Counseling:  I discussed with the patient the risks of imiquimod including but not limited to erythema, scaling, itching, weeping, crusting, and pain.  Patient understands that the inflammatory response to imiquimod is variable from person to person and was educated regarded proper titration schedule.  If flu-like symptoms develop, patient knows to discontinue the medication and contact us. Tremfya Counseling: I discussed with the patient the risks of guselkumab including but not limited to immunosuppression, serious infections, and drug reactions.  The patient understands that monitoring is required including a PPD at baseline and must alert us or the primary physician if symptoms of infection or other concerning signs are noted. Olanzapine Pregnancy And Lactation Text: This medication is pregnancy category C.   There are no adequate and well controlled trials with olanzapine in pregnant females.  Olanzapine should be used during pregnancy only if the potential benefit justifies the potential risk to the fetus.   In a study in lactating healthy women, olanzapine was excreted in breast milk.  It is recommended that women taking olanzapine should not breast feed. Humira Pregnancy And Lactation Text: This medication is Pregnancy Category B and is considered safe during pregnancy. It is unknown if this medication is excreted in breast milk. Odomzo Counseling- I discussed with the patient the risks of Odomzo including but not limited to nausea, vomiting, diarrhea, constipation, weight loss, changes in the sense of taste, decreased appetite, muscle spasms, and hair loss.  The patient verbalized understanding of the proper use and possible adverse effects of Odomzo.  All of the patient's questions and concerns were addressed. Birth Control Pills Counseling: Birth Control Pill Counseling: I discussed with the patient the potential side effects of OCPs including but not limited to increased risk of stroke, heart attack, thrombophlebitis, deep venous thrombosis, hepatic adenomas, breast changes, GI upset, headaches, and depression.  The patient verbalized understanding of the proper use and possible adverse effects of OCPs. All of the patient's questions and concerns were addressed. Thalidomide Counseling: I discussed with the patient the risks of thalidomide including but not limited to birth defects, anxiety, weakness, chest pain, dizziness, cough and severe allergy. Colchicine Counseling:  Patient counseled regarding adverse effects including but not limited to stomach upset (nausea, vomiting, stomach pain, or diarrhea).  Patient instructed to limit alcohol consumption while taking this medication.  Colchicine may reduce blood counts especially with prolonged use.  The patient understands that monitoring of kidney function and blood counts may be required, especially at baseline. The patient verbalized understanding of the proper use and possible adverse effects of colchicine.  All of the patient's questions and concerns were addressed. Albendazole Pregnancy And Lactation Text: This medication is Pregnancy Category C and it isn't known if it is safe during pregnancy. It is also excreted in breast milk. Xelmarryz Pregnancy And Lactation Text: This medication is Pregnancy Category D and is not considered safe during pregnancy.  The risk during breast feeding is also uncertain. High Dose Vitamin A Pregnancy And Lactation Text: High dose vitamin A therapy is contraindicated during pregnancy and breast feeding. Simponi Counseling:  I discussed with the patient the risks of golimumab including but not limited to myelosuppression, immunosuppression, autoimmune hepatitis, demyelinating diseases, lymphoma, and serious infections.  The patient understands that monitoring is required including a PPD at baseline and must alert us or the primary physician if symptoms of infection or other concerning signs are noted. Cimzia Pregnancy And Lactation Text: This medication crosses the placenta but can be considered safe in certain situations. Cimzia may be excreted in breast milk. Elidel Counseling: Patient may experience a mild burning sensation during topical application. Elidel is not approved in children less than 2 years of age. There have been case reports of hematologic and skin malignancies in patients using topical calcineurin inhibitors although causality is questionable. Hydroxychloroquine Pregnancy And Lactation Text: This medication has been shown to cause fetal harm but it isn't assigned a Pregnancy Risk Category. There are small amounts excreted in breast milk. Solaraze Counseling:  I discussed with the patient the risks of Solaraze including but not limited to erythema, scaling, itching, weeping, crusting, and pain. Cephalexin Counseling: I counseled the patient regarding use of cephalexin as an antibiotic for prophylactic and/or therapeutic purposes. Cephalexin (commonly prescribed under brand name Keflex) is a cephalosporin antibiotic which is active against numerous classes of bacteria, including most skin bacteria. Side effects may include nausea, diarrhea, gastrointestinal upset, rash, hives, yeast infections, and in rare cases, hepatitis, kidney disease, seizures, fever, confusion, neurologic symptoms, and others. Patients with severe allergies to penicillin medications are cautioned that there is about a 10% incidence of cross-reactivity with cephalosporins. When possible, patients with penicillin allergies should use alternatives to cephalosporins for antibiotic therapy. Litfulo Pregnancy And Lactation Text: Based on animal studies, Lifulo may cause embryo-fetal harm when administered to pregnant women.  The medication should not be used in pregnancy.  Breastfeeding is not recommended during treatment. Cyclosporine Pregnancy And Lactation Text: This medication is Pregnancy Category C and it isn't know if it is safe during pregnancy. This medication is excreted in breast milk. Sarecycline Counseling: Patient advised regarding possible photosensitivity and discoloration of the teeth, skin, lips, tongue and gums.  Patient instructed to avoid sunlight, if possible.  When exposed to sunlight, patients should wear protective clothing, sunglasses, and sunscreen.  The patient was instructed to call the office immediately if the following severe adverse effects occur:  hearing changes, easy bruising/bleeding, severe headache, or vision changes.  The patient verbalized understanding of the proper use and possible adverse effects of sarecycline.  All of the patient's questions and concerns were addressed. Birth Control Pills Pregnancy And Lactation Text: This medication should be avoided if pregnant and for the first 30 days post-partum. Oral Minoxidil Counseling- I discussed with the patient the risks of oral minoxidil including but not limited to shortness of breath, swelling of the feet or ankles, dizziness, lightheadedness, unwanted hair growth and allergic reaction.  The patient verbalized understanding of the proper use and possible adverse effects of oral minoxidil.  All of the patient's questions and concerns were addressed. Topical Clindamycin Pregnancy And Lactation Text: This medication is Pregnancy Category B and is considered safe during pregnancy. It is unknown if it is excreted in breast milk. Ilumya Counseling: I discussed with the patient the risks of tildrakizumab including but not limited to immunosuppression, malignancy, posterior leukoencephalopathy syndrome, and serious infections.  The patient understands that monitoring is required including a PPD at baseline and must alert us or the primary physician if symptoms of infection or other concerning signs are noted. Klisyri Pregnancy And Lactation Text: It is unknown if this medication can harm a developing fetus or if it is excreted in breast milk. Cimetidine Counseling:  I discussed with the patient the risks of Cimetidine including but not limited to gynecomastia, headache, diarrhea, nausea, drowsiness, arrhythmias, pancreatitis, skin rashes, psychosis, bone marrow suppression and kidney toxicity. Carac Counseling:  I discussed with the patient the risks of Carac including but not limited to erythema, scaling, itching, weeping, crusting, and pain. Metronidazole Pregnancy And Lactation Text: This medication is Pregnancy Category B and considered safe during pregnancy.  It is also excreted in breast milk. Itraconazole Counseling:  I discussed with the patient the risks of itraconazole including but not limited to liver damage, nausea/vomiting, neuropathy, and severe allergy.  The patient understands that this medication is best absorbed when taken with acidic beverages such as non-diet cola or ginger ale.  The patient understands that monitoring is required including baseline LFTs and repeat LFTs at intervals.  The patient understands that they are to contact us or the primary physician if concerning signs are noted. Wartpeel Counseling:  I discussed with the patient the risks of Wartpeel including but not limited to erythema, scaling, itching, weeping, crusting, and pain. Thalidomide Pregnancy And Lactation Text: This medication is Pregnancy Category X and is absolutely contraindicated during pregnancy. It is unknown if it is excreted in breast milk. Ivermectin Counseling:  Patient instructed to take medication on an empty stomach with a full glass of water.  Patient informed of potential adverse effects including but not limited to nausea, diarrhea, dizziness, itching, and swelling of the extremities or lymph nodes.  The patient verbalized understanding of the proper use and possible adverse effects of ivermectin.  All of the patient's questions and concerns were addressed. Cosentyx Counseling:  I discussed with the patient the risks of Cosentyx including but not limited to worsening of Crohn's disease, immunosuppression, allergic reactions and infections.  The patient understands that monitoring is required including a PPD at baseline and must alert us or the primary physician if symptoms of infection or other concerning signs are noted. Cephalexin Pregnancy And Lactation Text: This medication is Pregnancy Category B and considered safe during pregnancy.  It is also excreted in breast milk but can be used safely for shorter doses. Low Dose Naltrexone Counseling- I discussed with the patient the potential risks and side effects of low dose naltrexone including but not limited to: more vivid dreams, headaches, nausea, vomiting, abdominal pain, fatigue, dizziness, and anxiety. Minoxidil Counseling: Minoxidil is a topical medication which can increase blood flow where it is applied. It is uncertain how this medication increases hair growth. Side effects are uncommon and include stinging and allergic reactions. Solaraze Pregnancy And Lactation Text: This medication is Pregnancy Category B and is considered safe. There is some data to suggest avoiding during the third trimester. It is unknown if this medication is excreted in breast milk. Topical Ketoconazole Counseling: Patient counseled that this medication may cause skin irritation or allergic reactions.  In the event of skin irritation, the patient was advised to reduce the amount of the drug applied or use it less frequently.   The patient verbalized understanding of the proper use and possible adverse effects of ketoconazole.  All of the patient's questions and concerns were addressed. Oral Minoxidil Pregnancy And Lactation Text: This medication should only be used when clearly needed if you are pregnant, attempting to become pregnant or breast feeding. Spironolactone Counseling: Patient advised regarding risks of diarrhea, abdominal pain, hyperkalemia, birth defects (for female patients), liver toxicity and renal toxicity. The patient may need blood work to monitor liver and kidney function and potassium levels while on therapy. The patient verbalized understanding of the proper use and possible adverse effects of spironolactone.  All of the patient's questions and concerns were addressed. Olumiant Counseling: I discussed with the patient the risks of Olumiant therapy including but not limited to upper respiratory tract infections, shingles, cold sores, and nausea. Live vaccines should be avoided.  This medication has been linked to serious infections; higher rate of mortality; malignancy and lymphoproliferative disorders; major adverse cardiovascular events; thrombosis; gastrointestinal perforations; neutropenia; lymphopenia; anemia; liver enzyme elevations; and lipid elevations. Methotrexate Counseling:  Patient counseled regarding adverse effects of methotrexate including but not limited to nausea, vomiting, abnormalities in liver function tests. Patients may develop mouth sores, rash, diarrhea, and abnormalities in blood counts. The patient understands that monitoring is required including LFT's and blood counts.  There is a rare possibility of scarring of the liver and lung problems that can occur when taking methotrexate. Persistent nausea, loss of appetite, pale stools, dark urine, cough, and shortness of breath should be reported immediately. Patient advised to discontinue methotrexate treatment at least three months before attempting to become pregnant.  I discussed the need for folate supplements while taking methotrexate.  These supplements can decrease side effects during methotrexate treatment. The patient verbalized understanding of the proper use and possible adverse effects of methotrexate.  All of the patient's questions and concerns were addressed. Xolair Counseling:  Patient informed of potential adverse effects including but not limited to fever, muscle aches, rash and allergic reactions.  The patient verbalized understanding of the proper use and possible adverse effects of Xolair.  All of the patient's questions and concerns were addressed. Wartpeel Pregnancy And Lactation Text: This medication is Pregnancy Category X and contraindicated in pregnancy and in women who may become pregnant. It is unknown if this medication is excreted in breast milk. Acitretin Counseling:  I discussed with the patient the risks of acitretin including but not limited to hair loss, dry lips/skin/eyes, liver damage, hyperlipidemia, depression/suicidal ideation, photosensitivity.  Serious rare side effects can include but are not limited to pancreatitis, pseudotumor cerebri, bony changes, clot formation/stroke/heart attack.  Patient understands that alcohol is contraindicated since it can result in liver toxicity and significantly prolong the elimination of the drug by many years. Tranexamic Acid Counseling:  Patient advised of the small risk of bleeding problems with tranexamic acid. They were also instructed to call if they developed any nausea, vomiting or diarrhea. All of the patient's questions and concerns were addressed. Itraconazole Pregnancy And Lactation Text: This medication is Pregnancy Category C and it isn't know if it is safe during pregnancy. It is also excreted in breast milk. Sarecycline Pregnancy And Lactation Text: This medication is Pregnancy Category D and not consider safe during pregnancy. It is also excreted in breast milk. Soolantra Counseling: I discussed with the patients the risks of topial Soolantra. This is a medicine which decreases the number of mites and inflammation in the skin. You experience burning, stinging, eye irritation or allergic reactions.  Please call our office if you develop any problems from using this medication. Clindamycin Counseling: I counseled the patient regarding use of clindamycin as an antibiotic for prophylactic and/or therapeutic purposes. Clindamycin is active against numerous classes of bacteria, including skin bacteria. Side effects may include nausea, diarrhea, gastrointestinal upset, rash, hives, yeast infections, and in rare cases, colitis. Opioid Counseling: I discussed with the patient the potential side effects of opioids including but not limited to addiction, altered mental status, and depression. I stressed avoiding alcohol, benzodiazepines, muscle relaxants and sleep aids unless specifically okayed by a physician. The patient verbalized understanding of the proper use and possible adverse effects of opioids. All of the patient's questions and concerns were addressed. They were instructed to flush the remaining pills down the toilet if they did not need them for pain. Eucrisa Counseling: Patient may experience a mild burning sensation during topical application. Eucrisa is not approved in children less than 2 years of age. Minocycline Counseling: Patient advised regarding possible photosensitivity and discoloration of the teeth, skin, lips, tongue and gums.  Patient instructed to avoid sunlight, if possible.  When exposed to sunlight, patients should wear protective clothing, sunglasses, and sunscreen.  The patient was instructed to call the office immediately if the following severe adverse effects occur:  hearing changes, easy bruising/bleeding, severe headache, or vision changes.  The patient verbalized understanding of the proper use and possible adverse effects of minocycline.  All of the patient's questions and concerns were addressed. Dutasteride Male Counseling: Dustasteride Counseling:  I discussed with the patient the risks of use of dutasteride including but not limited to decreased libido, decreased ejaculate volume, and gynecomastia. Women who can become pregnant should not handle medication.  All of the patient's questions and concerns were addressed. Cimetidine Pregnancy And Lactation Text: This medication is Pregnancy Category B and is considered safe during pregnancy. It is also excreted in breast milk and breast feeding isn't recommended. Dapsone Counseling: I discussed with the patient the risks of dapsone including but not limited to hemolytic anemia, agranulocytosis, rashes, methemoglobinemia, kidney failure, peripheral neuropathy, headaches, GI upset, and liver toxicity.  Patients who start dapsone require monitoring including baseline LFTs and weekly CBCs for the first month, then every month thereafter.  The patient verbalized understanding of the proper use and possible adverse effects of dapsone.  All of the patient's questions and concerns were addressed. Acitretin Pregnancy And Lactation Text: This medication is Pregnancy Category X and should not be given to women who are pregnant or may become pregnant in the future. This medication is excreted in breast milk. Infliximab Counseling:  I discussed with the patient the risks of infliximab including but not limited to myelosuppression, immunosuppression, autoimmune hepatitis, demyelinating diseases, lymphoma, and serious infections.  The patient understands that monitoring is required including a PPD at baseline and must alert us or the primary physician if symptoms of infection or other concerning signs are noted. Minoxidil Pregnancy And Lactation Text: This medication has not been assigned a Pregnancy Risk Category but animal studies failed to show danger with the topical medication. It is unknown if the medication is excreted in breast milk. Skyrizi Counseling: I discussed with the patient the risks of risankizumab-rzaa including but not limited to immunosuppression, and serious infections.  The patient understands that monitoring is required including a PPD at baseline and must alert us or the primary physician if symptoms of infection or other concerning signs are noted. Olumiant Pregnancy And Lactation Text: Based on animal studies, Olumiant may cause embryo-fetal harm when administered to pregnant women.  The medication should not be used in pregnancy.  Breastfeeding is not recommended during treatment. Methotrexate Pregnancy And Lactation Text: This medication is Pregnancy Category X and is known to cause fetal harm. This medication is excreted in breast milk. Low Dose Naltrexone Pregnancy And Lactation Text: Naltrexone is pregnancy category C.  There have been no adequate and well-controlled studies in pregnant women.  It should be used in pregnancy only if the potential benefit justifies the potential risk to the fetus.   Limited data indicates that naltrexone is minimally excreted into breastmilk. Xolair Pregnancy And Lactation Text: This medication is Pregnancy Category B and is considered safe during pregnancy. This medication is excreted in breast milk. Oxybutynin Counseling:  I discussed with the patient the risks of oxybutynin including but not limited to skin rash, drowsiness, dry mouth, difficulty urinating, and blurred vision. Spironolactone Pregnancy And Lactation Text: This medication can cause feminization of the male fetus and should be avoided during pregnancy. The active metabolite is also found in breast milk. Otezla Counseling: The side effects of Otezla were discussed with the patient, including but not limited to worsening or new depression, weight loss, diarrhea, nausea, upper respiratory tract infection, and headache. Patient instructed to call the office should any adverse effect occur.  The patient verbalized understanding of the proper use and possible adverse effects of Otezla.  All the patient's questions and concerns were addressed. Tetracycline Counseling: Patient counseled regarding possible photosensitivity and increased risk for sunburn.  Patient instructed to avoid sunlight, if possible.  When exposed to sunlight, patients should wear protective clothing, sunglasses, and sunscreen.  The patient was instructed to call the office immediately if the following severe adverse effects occur:  hearing changes, easy bruising/bleeding, severe headache, or vision changes.  The patient verbalized understanding of the proper use and possible adverse effects of tetracycline.  All of the patient's questions and concerns were addressed. Patient understands to avoid pregnancy while on therapy due to potential birth defects. Winlevi Counseling:  I discussed with the patient the risks of topical clascoterone including but not limited to erythema, scaling, itching, and stinging. Patient voiced their understanding. Ketoconazole Counseling:   Patient counseled regarding improving absorption with orange juice.  Adverse effects include but are not limited to breast enlargement, headache, diarrhea, nausea, upset stomach, liver function test abnormalities, taste disturbance, and stomach pain.  There is a rare possibility of liver failure that can occur when taking ketoconazole. The patient understands that monitoring of LFTs may be required, especially at baseline. The patient verbalized understanding of the proper use and possible adverse effects of ketoconazole.  All of the patient's questions and concerns were addressed. Protopic Counseling: Patient may experience a mild burning sensation during topical application. Protopic is not approved in children less than 2 years of age. There have been case reports of hematologic and skin malignancies in patients using topical calcineurin inhibitors although causality is questionable. Tranexamic Acid Pregnancy And Lactation Text: It is unknown if this medication is safe during pregnancy or breast feeding. Soolantra Pregnancy And Lactation Text: This medication is Pregnancy Category C. This medication is considered safe during breast feeding. Azathioprine Pregnancy And Lactation Text: This medication is Pregnancy Category D and isn't considered safe during pregnancy. It is unknown if this medication is excreted in breast milk. Calcipotriene Counseling:  I discussed with the patient the risks of calcipotriene including but not limited to erythema, scaling, itching, and irritation. Doxepin Counseling:  Patient advised that the medication is sedating and not to drive a car after taking this medication. Patient informed of potential adverse effects including but not limited to dry mouth, urinary retention, and blurry vision.  The patient verbalized understanding of the proper use and possible adverse effects of doxepin.  All of the patient's questions and concerns were addressed. Dutasteride Female Counseling: Dutasteride Counseling:  I discussed with the patient the risks of use of dutasteride including but not limited to decreased libido and sexual dysfunction. Explained the teratogenic nature of the medication and stressed the importance of not getting pregnant during treatment. All of the patient's questions and concerns were addressed. Calcipotriene Pregnancy And Lactation Text: The use of this medication during pregnancy or lactation is not recommended as there is insufficient data. Clindamycin Pregnancy And Lactation Text: This medication can be used in pregnancy if certain situations. Clindamycin is also present in breast milk. Topical Metronidazole Counseling: Metronidazole is a topical antibiotic medication. You may experience burning, stinging, redness, or allergic reactions.  Please call our office if you develop any problems from using this medication. Dapsone Pregnancy And Lactation Text: This medication is Pregnancy Category C and is not considered safe during pregnancy or breast feeding. Dupixent Counseling: I discussed with the patient the risks of dupilumab including but not limited to eye infection and irritation, cold sores, injection site reactions, worsening of asthma, allergic reactions and increased risk of parasitic infection.  Live vaccines should be avoided while taking dupilumab. Dupilumab will also interact with certain medications such as warfarin and cyclosporine. The patient understands that monitoring is required and they must alert us or the primary physician if symptoms of infection or other concerning signs are noted. Winlevi Pregnancy And Lactation Text: This medication is considered safe during pregnancy and breastfeeding. Aklief counseling:  Patient advised to apply a pea-sized amount only at bedtime and wait 30 minutes after washing their face before applying.  If too drying, patient may add a non-comedogenic moisturizer.  The most commonly reported side effects including irritation, redness, scaling, dryness, stinging, burning, itching, and increased risk of sunburn.  The patient verbalized understanding of the proper use and possible adverse effects of retinoids.  All of the patient's questions and concerns were addressed. Bexarotene Counseling:  I discussed with the patient the risks of bexarotene including but not limited to hair loss, dry lips/skin/eyes, liver abnormalities, hyperlipidemia, pancreatitis, depression/suicidal ideation, photosensitivity, drug rash/allergic reactions, hypothyroidism, anemia, leukopenia, infection, cataracts, and teratogenicity.  Patient understands that they will need regular blood tests to check lipid profile, liver function tests, white blood cell count, thyroid function tests and pregnancy test if applicable. Adbry Counseling: I discussed with the patient the risks of tralokinumab including but not limited to eye infection and irritation, cold sores, injection site reactions, worsening of asthma, allergic reactions and increased risk of parasitic infection.  Live vaccines should be avoided while taking tralokinumab. The patient understands that monitoring is required and they must alert us or the primary physician if symptoms of infection or other concerning signs are noted. Mirvaso Counseling: Mirvaso is a topical medication which can decrease superficial blood flow where applied. Side effects are uncommon and include stinging, redness and allergic reactions. Rinvoq Counseling: I discussed with the patient the risks of Rinvoq therapy including but not limited to upper respiratory tract infections, shingles, cold sores, bronchitis, nausea, cough, fever, acne, and headache. Live vaccines should be avoided.  This medication has been linked to serious infections; higher rate of mortality; malignancy and lymphoproliferative disorders; major adverse cardiovascular events; thrombosis; thrombocytopenia, anemia, and neutropenia; lipid elevations; liver enzyme elevations; and gastrointestinal perforations. Opioid Pregnancy And Lactation Text: These medications can lead to premature delivery and should be avoided during pregnancy. These medications are also present in breast milk in small amounts. Prednisone Counseling:  I discussed with the patient the risks of prolonged use of prednisone including but not limited to weight gain, insomnia, osteoporosis, mood changes, diabetes, susceptibility to infection, glaucoma and high blood pressure.  In cases where prednisone use is prolonged, patients should be monitored with blood pressure checks, serum glucose levels and an eye exam.  Additionally, the patient may need to be placed on GI prophylaxis, PCP prophylaxis, and calcium and vitamin D supplementation and/or a bisphosphonate.  The patient verbalized understanding of the proper use and the possible adverse effects of prednisone.  All of the patient's questions and concerns were addressed. Niacinamide Counseling: I recommended taking niacin or niacinamide, also know as vitamin B3, twice daily. Recent evidence suggests that taking vitamin B3 (500 mg twice daily) can reduce the risk of actinic keratoses and non-melanoma skin cancers. Side effects of vitamin B3 include flushing and headache. Ketoconazole Pregnancy And Lactation Text: This medication is Pregnancy Category C and it isn't know if it is safe during pregnancy. It is also excreted in breast milk and breast feeding isn't recommended. Cantharidin Counseling:  I discussed with the patient the risks of Cantharidin including but not limited to pain, redness, burning, itching, and blistering. Otezla Pregnancy And Lactation Text: This medication is Pregnancy Category C and it isn't known if it is safe during pregnancy. It is unknown if it is excreted in breast milk. Valtrex Counseling: I discussed with the patient the risks of valacyclovir including but not limited to kidney damage, nausea, vomiting and severe allergy.  The patient understands that if the infection seems to be worsening or is not improving, they are to call. Dupixent Pregnancy And Lactation Text: This medication likely crosses the placenta but the risk for the fetus is uncertain. This medication is excreted in breast milk. Azathioprine Counseling:  I discussed with the patient the risks of azathioprine including but not limited to myelosuppression, immunosuppression, hepatotoxicity, lymphoma, and infections.  The patient understands that monitoring is required including baseline LFTs, Creatinine, possible TPMP genotyping and weekly CBCs for the first month and then every 2 weeks thereafter.  The patient verbalized understanding of the proper use and possible adverse effects of azathioprine.  All of the patient's questions and concerns were addressed. Cellcept Counseling:  I discussed with the patient the risks of mycophenolate mofetil including but not limited to infection/immunosuppression, GI upset, hypokalemia, hypercholesterolemia, bone marrow suppression, lymphoproliferative disorders, malignancy, GI ulceration/bleed/perforation, colitis, interstitial lung disease, kidney failure, progressive multifocal leukoencephalopathy, and birth defects.  The patient understands that monitoring is required including a baseline creatinine and regular CBC testing. In addition, patient must alert us immediately if symptoms of infection or other concerning signs are noted. Topical Retinoid counseling:  Patient advised to apply a pea-sized amount only at bedtime and wait 30 minutes after washing their face before applying.  If too drying, patient may add a non-comedogenic moisturizer. The patient verbalized understanding of the proper use and possible adverse effects of retinoids.  All of the patient's questions and concerns were addressed. Gabapentin Counseling: I discussed with the patient the risks of gabapentin including but not limited to dizziness, somnolence, fatigue and ataxia. Protopic Pregnancy And Lactation Text: This medication is Pregnancy Category C. It is unknown if this medication is excreted in breast milk when applied topically. Doxepin Pregnancy And Lactation Text: This medication is Pregnancy Category C and it isn't known if it is safe during pregnancy. It is also excreted in breast milk and breast feeding isn't recommended. Quinolones Counseling:  I discussed with the patient the risks of fluoroquinolones including but not limited to GI upset, allergic reaction, drug rash, diarrhea, dizziness, photosensitivity, yeast infections, liver function test abnormalities, tendonitis/tendon rupture. Niacinamide Pregnancy And Lactation Text: These medications are considered safe during pregnancy. Erivedge Counseling- I discussed with the patient the risks of Erivedge including but not limited to nausea, vomiting, diarrhea, constipation, weight loss, changes in the sense of taste, decreased appetite, muscle spasms, and hair loss.  The patient verbalized understanding of the proper use and possible adverse effects of Erivedge.  All of the patient's questions and concerns were addressed. Dutasteride Pregnancy And Lactation Text: This medication is absolutely contraindicated in women, especially during pregnancy and breast feeding. Feminization of male fetuses is possible if taking while pregnant. Topical Metronidazole Pregnancy And Lactation Text: This medication is Pregnancy Category B and considered safe during pregnancy.  It is also considered safe to use while breastfeeding. Stelara Counseling:  I discussed with the patient the risks of ustekinumab including but not limited to immunosuppression, malignancy, posterior leukoencephalopathy syndrome, and serious infections.  The patient understands that monitoring is required including a PPD at baseline and must alert us or the primary physician if symptoms of infection or other concerning signs are noted. Doxycycline Counseling:  Patient counseled regarding possible photosensitivity and increased risk for sunburn.  Patient instructed to avoid sunlight, if possible.  When exposed to sunlight, patients should wear protective clothing, sunglasses, and sunscreen.  The patient was instructed to call the office immediately if the following severe adverse effects occur:  hearing changes, easy bruising/bleeding, severe headache, or vision changes.  The patient verbalized understanding of the proper use and possible adverse effects of doxycycline.  All of the patient's questions and concerns were addressed. Hydroquinone Counseling:  Patient advised that medication may result in skin irritation, lightening (hypopigmentation), dryness, and burning.  In the event of skin irritation, the patient was advised to reduce the amount of the drug applied or use it less frequently.  Rarely, spots that are treated with hydroquinone can become darker (pseudoochronosis).  Should this occur, patient instructed to stop medication and call the office. The patient verbalized understanding of the proper use and possible adverse effects of hydroquinone.  All of the patient's questions and concerns were addressed. Aklief Pregnancy And Lactation Text: It is unknown if this medication is safe to use during pregnancy.  It is unknown if this medication is excreted in breast milk.  Breastfeeding women should use the topical cream on the smallest area of the skin for the shortest time needed while breastfeeding.  Do not apply to nipple and areola. Adbry Pregnancy And Lactation Text: It is unknown if this medication will adversely affect pregnancy or breast feeding. Rinvoq Pregnancy And Lactation Text: Based on animal studies, Rinvoq may cause embryo-fetal harm when administered to pregnant women.  The medication should not be used in pregnancy.  Breastfeeding is not recommended during treatment and for 6 days after the last dose. Cantharidin Pregnancy And Lactation Text: This medication has not been proven safe during pregnancy. It is unknown if this medication is excreted in breast milk. Azithromycin Counseling:  I discussed with the patient the risks of azithromycin including but not limited to GI upset, allergic reaction, drug rash, diarrhea, and yeast infections. Bexarotene Pregnancy And Lactation Text: This medication is Pregnancy Category X and should not be given to women who are pregnant or may become pregnant. This medication should not be used if you are breast feeding. Valtrex Pregnancy And Lactation Text: this medication is Pregnancy Category B and is considered safe during pregnancy. This medication is not directly found in breast milk but it's metabolite acyclovir is present. VTAMA Counseling: I discussed with the patient that VTAMA is not for use in the eyes, mouth or mouth. They should call the office if they develop any signs of allergic reactions to VTAMA. The patient verbalized understanding of the proper use and possible adverse effects of VTAMA.  All of the patient's questions and concerns were addressed. Propranolol Counseling:  I discussed with the patient the risks of propranolol including but not limited to low heart rate, low blood pressure, low blood sugar, restlessness and increased cold sensitivity. They should call the office if they experience any of these side effects. Terbinafine Counseling: Patient counseling regarding adverse effects of terbinafine including but not limited to headache, diarrhea, rash, upset stomach, liver function test abnormalities, itching, taste/smell disturbance, nausea, abdominal pain, and flatulence.  There is a rare possibility of liver failure that can occur when taking terbinafine.  The patient understands that a baseline LFT and kidney function test may be required. The patient verbalized understanding of the proper use and possible adverse effects of terbinafine.  All of the patient's questions and concerns were addressed. Arava Counseling:  Patient counseled regarding adverse effects of Arava including but not limited to nausea, vomiting, abnormalities in liver function tests. Patients may develop mouth sores, rash, diarrhea, and abnormalities in blood counts. The patient understands that monitoring is required including LFTs and blood counts.  There is a rare possibility of scarring of the liver and lung problems that can occur when taking methotrexate. Persistent nausea, loss of appetite, pale stools, dark urine, cough, and shortness of breath should be reported immediately. Patient advised to discontinue Arava treatment and consult with a physician prior to attempting conception. The patient will have to undergo a treatment to eliminate Arava from the body prior to conception. 5-Fu Counseling: 5-Fluorouracil Counseling:  I discussed with the patient the risks of 5-fluorouracil including but not limited to erythema, scaling, itching, weeping, crusting, and pain. Rituxan Counseling:  I discussed with the patient the risks of Rituxan infusions. Side effects can include infusion reactions, severe drug rashes including mucocutaneous reactions, reactivation of latent hepatitis and other infections and rarely progressive multifocal leukoencephalopathy.  All of the patient's questions and concerns were addressed. Qbrexza Counseling:  I discussed with the patient the risks of Qbrexza including but not limited to headache, mydriasis, blurred vision, dry eyes, nasal dryness, dry mouth, dry throat, dry skin, urinary hesitation, and constipation.  Local skin reactions including erythema, burning, stinging, and itching can also occur. Enbrel Counseling:  I discussed with the patient the risks of etanercept including but not limited to myelosuppression, immunosuppression, autoimmune hepatitis, demyelinating diseases, lymphoma, and infections.  The patient understands that monitoring is required including a PPD at baseline and must alert us or the primary physician if symptoms of infection or other concerning signs are noted. Finasteride Male Counseling: Finasteride Counseling:  I discussed with the patient the risks of use of finasteride including but not limited to decreased libido, decreased ejaculate volume, gynecomastia, and depression. Women should not handle medication.  All of the patient's questions and concerns were addressed. Hydroxyzine Counseling: Patient advised that the medication is sedating and not to drive a car after taking this medication.  Patient informed of potential adverse effects including but not limited to dry mouth, urinary retention, and blurry vision.  The patient verbalized understanding of the proper use and possible adverse effects of hydroxyzine.  All of the patient's questions and concerns were addressed. Opzelura Counseling:  I discussed with the patient the risks of Opzelura including but not limited to nasopharngitis, bronchitis, ear infection, eosinophila, hives, diarrhea, folliculitis, tonsillitis, and rhinorrhea.  Taken orally, this medication has been linked to serious infections; higher rate of mortality; malignancy and lymphoproliferative disorders; major adverse cardiovascular events; thrombosis; thrombocytopenia, anemia, and neutropenia; and lipid elevations. Nsaids Counseling: NSAID Counseling: I discussed with the patient that NSAIDs should be taken with food. Prolonged use of NSAIDs can result in the development of stomach ulcers.  Patient advised to stop taking NSAIDs if abdominal pain occurs.  The patient verbalized understanding of the proper use and possible adverse effects of NSAIDs.  All of the patient's questions and concerns were addressed. Topical Steroids Counseling: I discussed with the patient that prolonged use of topical steroids can result in the increased appearance of superficial blood vessels (telangiectasias), lightening (hypopigmentation) and thinning of the skin (atrophy).  Patient understands to avoid using high potency steroids in skin folds, the groin or the face.  The patient verbalized understanding of the proper use and possible adverse effects of topical steroids.  All of the patient's questions and concerns were addressed. Propranolol Pregnancy And Lactation Text: This medication is Pregnancy Category C and it isn't known if it is safe during pregnancy. It is excreted in breast milk. Fluconazole Counseling:  Patient counseled regarding adverse effects of fluconazole including but not limited to headache, diarrhea, nausea, upset stomach, liver function test abnormalities, taste disturbance, and stomach pain.  There is a rare possibility of liver failure that can occur when taking fluconazole.  The patient understands that monitoring of LFTs and kidney function test may be required, especially at baseline. The patient verbalized understanding of the proper use and possible adverse effects of fluconazole.  All of the patient's questions and concerns were addressed. Doxycycline Pregnancy And Lactation Text: This medication is Pregnancy Category D and not consider safe during pregnancy. It is also excreted in breast milk but is considered safe for shorter treatment courses. Sotyktu Counseling:  I discussed the most common side effects of Sotyktu including: common cold, sore throat, sinus infections, cold sores, canker sores, folliculitis, and acne.? I also discussed more serious side effects of Sotyktu including but not limited to: serious allergic reactions; increased risk for infections such as TB; cancers such as lymphomas; rhabdomyolysis and elevated CPK; and elevated triglycerides and liver enzymes.? Detail Level: Simple Azithromycin Pregnancy And Lactation Text: This medication is considered safe during pregnancy and is also secreted in breast milk. Azelaic Acid Counseling: Patient counseled that medicine may cause skin irritation and to avoid applying near the eyes.  In the event of skin irritation, the patient was advised to reduce the amount of the drug applied or use it less frequently.   The patient verbalized understanding of the proper use and possible adverse effects of azelaic acid.  All of the patient's questions and concerns were addressed. Isotretinoin Counseling: Patient should get monthly blood tests, not donate blood, not drive at night if vision affected, not share medication, and not undergo elective surgery for 6 months after tx completed. Side effects reviewed, pt to contact office should one occur. Bimzelx Counseling:  I discussed with the patient the risks of Bimzelx including but not limited to depression, immunosuppression, allergic reactions and infections.  The patient understands that monitoring is required including a PPD at baseline and must alert us or the primary physician if symptoms of infection or other concerning signs are noted. Imiquimod Counseling:  I discussed with the patient the risks of imiquimod including but not limited to erythema, scaling, itching, weeping, crusting, and pain.  Patient understands that the inflammatory response to imiquimod is variable from person to person and was educated regarded proper titration schedule.  If flu-like symptoms develop, patient knows to discontinue the medication and contact us. Hydroxyzine Pregnancy And Lactation Text: This medication is not safe during pregnancy and should not be taken. It is also excreted in breast milk and breast feeding isn't recommended. Rituxan Pregnancy And Lactation Text: This medication is Pregnancy Category C and it isn't know if it is safe during pregnancy. It is unknown if this medication is excreted in breast milk but similar antibodies are known to be excreted. Cibinqo Counseling: I discussed with the patient the risks of Cibinqo therapy including but not limited to common cold, nausea, headache, cold sores, increased blood CPK levels, dizziness, UTIs, fatigue, acne, and vomitting. Live vaccines should be avoided.  This medication has been linked to serious infections; higher rate of mortality; malignancy and lymphoproliferative disorders; major adverse cardiovascular events; thrombosis; thrombocytopenia and lymphopenia; lipid elevations; and retinal detachment. Qbrexza Pregnancy And Lactation Text: There is no available data on Qbrexza use in pregnant women.  There is no available data on Qbrexza use in lactation. Cyclophosphamide Counseling:  I discussed with the patient the risks of cyclophosphamide including but not limited to hair loss, hormonal abnormalities, decreased fertility, abdominal pain, diarrhea, nausea and vomiting, bone marrow suppression and infection. The patient understands that monitoring is required while taking this medication. Rifampin Counseling: I discussed with the patient the risks of rifampin including but not limited to liver damage, kidney damage, red-orange body fluids, nausea/vomiting and severe allergy. Libtayo Counseling- I discussed with the patient the risks of Libtayo including but not limited to nausea, vomiting, diarrhea, and bone or muscle pain.  The patient verbalized understanding of the proper use and possible adverse effects of Libtayo.  All of the patient's questions and concerns were addressed. Topical Steroids Applications Pregnancy And Lactation Text: Most topical steroids are considered safe to use during pregnancy and lactation.  Any topical steroid applied to the breast or nipple should be washed off before breastfeeding. Nsaids Pregnancy And Lactation Text: These medications are considered safe up to 30 weeks gestation. It is excreted in breast milk. Opzelura Pregnancy And Lactation Text: There is insufficient data to evaluate drug-associated risk for major birth defects, miscarriage, or other adverse maternal or fetal outcomes.  There is a pregnancy registry that monitors pregnancy outcomes in pregnant persons exposed to the medication during pregnancy.  It is unknown if this medication is excreted in breast milk.  Do not breastfeed during treatment and for about 4 weeks after the last dose. Taltz Counseling: I discussed with the patient the risks of ixekizumab including but not limited to immunosuppression, serious infections, worsening of inflammatory bowel disease and drug reactions.  The patient understands that monitoring is required including a PPD at baseline and must alert us or the primary physician if symptoms of infection or other concerning signs are noted. Glycopyrrolate Counseling:  I discussed with the patient the risks of glycopyrrolate including but not limited to skin rash, drowsiness, dry mouth, difficulty urinating, and blurred vision. Tazorac Counseling:  Patient advised that medication is irritating and drying.  Patient may need to apply sparingly and wash off after an hour before eventually leaving it on overnight.  The patient verbalized understanding of the proper use and possible adverse effects of tazorac.  All of the patient's questions and concerns were addressed.

## 2024-04-28 NOTE — ASSESSMENT & PLAN NOTE
Patient with Paroxysmal (<7 days) atrial fibrillation which is controlled currently with Beta Blocker and Rythmol . Patient is currently in sinus rhythm.[id number] Score: 4. Anticoagulation indicated. Anticoagulation done with xarelto .    - follows with Dr. Gallardo and Dr. Dede Enriquez, per chart review planning on ablation on 6/24  - holding home xarelto d/t possible GI bleed

## 2024-04-28 NOTE — FIRST PROVIDER EVALUATION
Medical screening examination initiated.  I have conducted a focused provider triage encounter, findings are as follows:    Brief history of present illness:  Dark stools that began this morning, history of GI bleed and symptomatic anemia    There were no vitals filed for this visit.    Pertinent physical exam:  No acute distress, patient alert and oriented    Brief workup plan:  Workup    Preliminary workup initiated; this workup will be continued and followed by the physician or advanced practice provider that is assigned to the patient when roomed.

## 2024-04-28 NOTE — HPI
"Mr. Pretty is a 78 year old male with a history of CAD s/p CABG, hepatocellular carcinoma, esophageal varices, DM, HTN, GERD, PAF (on xarelto), SMA stenting 01/05/2024,  and blaise on cpap who presents to the ED for evaluation of two episodes of blood in stool described as "black and oily" started today. Pt is on xarelto (last dose 4/27) and baby aspirin (last dose 4/28) but was taken off Plavix.  Patient denies any BRBPR, abdominal pain, and hematemesis.  Lab work in the ED notable for hgb 9.4, stool OCB +.  Case discussed with GI and ED, recommended PPI IV BID and NPO 0000 for scope.  Patient will be admitted to observation for UGI bleed.    Of note, EGD (01/17/24): Grade I esophageal varices without bleeding and gastritis.     Code Status Full  Surrogate Decision Maker Henrry willis   "

## 2024-04-28 NOTE — ASSESSMENT & PLAN NOTE
Plan:   -Patient states he had one black stool this am.   -No more bowel movements since being in the ED  -EDGAR was negative   -Last dose of Xarelto was last night.   -Clear liquid diet for now.   -We will monitor him for more episodes of melena  -Continue to hold Xarelto   -PPI IV BID   -Transfuse for hemoglobin > 7

## 2024-04-28 NOTE — CONSULTS
[facility] - Med [facility]urg  Gastroenterology  Consult Note    Patient Name: Erendira Pretty  MRN: [id number]  Admission Date: 4/28/2024  Hospital Length of Stay: 0 days  Code Status: Full Code   Attending Provider: Trino Cordova MD   Consulting Provider: Eder Foley MD  Primary Care Physician: Bhupinder Callaway MD  Principal Problem:Melena    Inpatient consult to Gastroenterology  Consult performed by: Eder Foley MD  Consult ordered by: Halina Villaseñor NP  Reason for consult: melena        Subjective:     HPI:  Mr Pretty is a 78 year old that presented to ED for one black stool that occurred this morning. His history is significant for SMA stenting 01/05/2024, liver cirrhosis and HCC (tx'd w/ ablation) with small esophageal varices and gastritis. He was admitted 01/16/24 for the same after starting ASA and Plavix following his stent placement. An EGD (01/17/24) showed grade I esophageal varices without bleeding and gastritis.    EGD (01/17/24): Grade I esophageal varices without bleeding and gastritis.   Colonoscopy (12/2023): colon polyps and ischemic ulcers which led to the recent SMA stenting.     VCE (2/2024) was normal.     Hemoglobin on arrival to ED was 9.4 (11 two weeks ago).   He is on aspirin and xarelto. Last dose of xarelto was last night.       Past Medical History:   Diagnosis Date    Anticoagulant long-term use     Aortic stenosis     Asthma     Benign prostatic hyperplasia with nocturia     Bilateral carotid artery stenosis 07/21/2021    US CAROTID BILATERAL 07/14/2021 IMPRESSION: Atherosclerosis with suspected stenosis greater than 50% at the right proximal ICA visually. Velocities are discordant and appear improved from prior. Atherosclerosis on the left with no evidence of flow-limiting stenosis greater than 50%.  FINDINGS: Right: Internal Carotid Artery (ICA): Peak systolic velocity 118 cm/sec. End diastolic velocity 35 cm/sec    BPH (benign prostatic hyperplasia)     CAD (coronary  artery disease)     40% lesion 2002;lazo    Cirrhosis     Claudication 04/09/2014    Colon polyp     COPD (chronic obstructive pulmonary disease)     ED (erectile dysfunction)     Encounter for blood transfusion     Ex-smoker     Hearing loss NEC     Hepatitis C     Cured;reji brown 2015    Hyperlipidemia     Hypertension     Hypothyroid     s/p tx graves    Open angle with borderline findings and low glaucoma risk in both eyes 09/22/2020    DELONTE on CPAP     Osteoarthritis     Paroxysmal atrial fibrillation     PVD (peripheral vascular disease)     Secondary esophageal varices without bleeding 06/26/2017    Type 2 diabetes mellitus        Past Surgical History:   Procedure Laterality Date    ANGIOGRAM, EXTREMITY, UNILATERAL Left 1/4/2024    Procedure: ANGIOGRAM, EXTREMITY, UNILATERAL- Femoral / SMS Stent, Poss General;  Surgeon: ALEX Alfred III, MD;  Location: [facility] OR [address] [place] [address];  Service: Vascular;  Laterality: Left;  mGy : 2698.78  Gy.cm : 229.88  Contrast : 62ml  Fluro time : 13.8min    CARDIAC CATHETERIZATION      CARDIAC SURGERY      CARPAL TUNNEL RELEASE Left     CATARACT EXTRACTION      CATARACT EXTRACTION W/ INTRAOCULAR LENS  IMPLANT, BILATERAL  2008    CATHETERIZATION OF BOTH LEFT AND RIGHT HEART N/A 11/2/2018    Procedure: CATHETERIZATION, HEART, BOTH LEFT AND RIGHT;  Surgeon: Frank Lazo MD;  Location: [facility] CATH LAB;  Service: Cardiology;  Laterality: N/A;    COLONOSCOPY  8/2013    COLONOSCOPY N/A 5/30/2016    Procedure: COLONOSCOPY;  Surgeon: Anna Tomas MD;  Location: [facility];  Service: Endoscopy;  Laterality: N/A;    COLONOSCOPY N/A 7/17/2019    Procedure: COLONOSCOPY;  Surgeon: David Carter III, MD;  Location: [facility];  Service: Endoscopy;  Laterality: N/A;    COLONOSCOPY N/A 12/14/2023    Procedure: COLONOSCOPY;  Surgeon: Ama Barrera MD;  Location: [facility] ENDO;  Service: Endoscopy;  Laterality: N/A;    COMPUTED TOMOGRAPHY N/A 9/9/2019    Procedure: CT (COMPUTED  TOMOGRAPHY);  Surgeon: [facility] Diagnostic Provider;  Location: [facility] OR;  Service: General;  Laterality: N/A;  Microwave ablation to be done in CT.  Need CRNA and cart    COMPUTED TOMOGRAPHY N/A 1/25/2022    Procedure: Liver Microwave Ablation;  Surgeon: Red Puentes MD;  [facility];  Service: General;  Laterality: N/A;    CORONARY ARTERY BYPASS GRAFT (CABG) N/A 11/5/2018    Procedure: CORONARY ARTERY BYPASS GRAFT (CABG);  Surgeon: Bear De Luna MD;  Location: [facility] OR;  Service: Cardiothoracic;  Laterality: N/A;  TWO VESSEL BYPASS WITH AORTOTOMY AND EXCISION OF ATHEROSCLEROTIC PLAQUE    CORONARY BYPASS GRAFT ANGIOGRAPHY  3/2/2020    Procedure: Bypass graft study;  Surgeon: Cora Cormier MD;  Location: [facility] CATH LAB;  Service: Cardiology;;    ELBOW BURSA SURGERY Right 2010    ENDOSCOPIC HARVEST OF VEIN Left 11/5/2018    Procedure: SURGICAL PROCUREMENT, VEIN, ENDOSCOPIC;  Surgeon: Bear De Luna MD;  Location: [facility];  Service: Cardiothoracic;  Laterality: Left;    ESOPHAGOGASTRODUODENOSCOPY N/A 7/17/2019    Procedure: ESOPHAGOGASTRODUODENOSCOPY (EGD);  Surgeon: David Carter III, MD;  Location: [facility] ENDO;  Service: Endoscopy;  Laterality: N/A;    ESOPHAGOGASTRODUODENOSCOPY N/A 12/29/2022    Procedure: ESOPHAGOGASTRODUODENOSCOPY (EGD);  Surgeon: Issa Gayle MD;  Location: [facility] ENDO;  Service: Endoscopy;  Laterality: N/A;    ESOPHAGOGASTRODUODENOSCOPY N/A 1/17/2024    Procedure: EGD (ESOPHAGOGASTRODUODENOSCOPY);  Surgeon: Issa Gayle MD;  Location: [facility] ENDO;  Service: Endoscopy;  Laterality: N/A;    ETHMOIDECTOMY Bilateral 3/27/2019    Procedure: ETHMOIDECTOMY;  Surgeon: Alejandro Parkinson MD;  Location: [facility] OR;  Service: ENT;  Laterality: Bilateral;    EYE SURGERY      FOOT SURGERY      FRONTAL SINUS OBLITERATION Bilateral 3/27/2019    Procedure: SINUSOTOMY, FRONTAL SINUS, OBLITERATIVE;  Surgeon: Alejandro Parkinson MD;  Location: [facility] OR;  Service: ENT;  Laterality: Bilateral;    FUNCTIONAL  ENDOSCOPIC SINUS SURGERY (FESS) Bilateral 3/27/2019    Procedure: FESS (FUNCTIONAL ENDOSCOPIC SINUS SURGERY);  Surgeon: Alejandro Parkinson MD;  Location: [facility] OR;  Service: ENT;  Laterality: Bilateral;  Left Keila bullosa resection     INTRALUMINAL GASTROINTESTINAL TRACT IMAGING VIA CAPSULE N/A 2/2/2024    Procedure: IMAGING PROCEDURE, GI TRACT, INTRALUMINAL, VIA CAPSULE;  Surgeon: Cooper Estrella RN;  Location: [facility] ENDO;  Service: Endoscopy;  Laterality: N/A;    KNEE ARTHROSCOPY W/ MENISCAL REPAIR Left 06/2019    dr boykin    KNEE SURGERY Right     LEFT HEART CATHETERIZATION Left 3/2/2020    Procedure: CATHETERIZATION, HEART, LEFT;  Surgeon: Cora Cormier MD;  Location: [facility] CATH LAB;  Service: Cardiology;  Laterality: Left;    LIVER BIOPSY  01/2022    MAXILLARY ANTROSTOMY Bilateral 3/27/2019    Procedure: MAXILLARY ANTROSTOMY;  Surgeon: Alejandro Parkinson MD;  Location: [facility] OR;  Service: ENT;  Laterality: Bilateral;    NASAL SEPTOPLASTY N/A 3/27/2019    Procedure: SEPTOPLASTY, NOSE;  Surgeon: Alejandro Parkinson MD;  Location: [facility] OR;  Service: ENT;  Laterality: N/A;    RECTAL SURGERY  2012    STENT, SUPERIOR MESENTERIC ARTERY Left 1/4/2024    Procedure: STENT, SUPERIOR MESENTERIC ARTERY;  Surgeon: ALEX Alfred III, MD;  Location: [address];  Service: Vascular;  Laterality: Left;    TRANSURETHRAL RESECTION OF PROSTATE (TURP) WITHOUT USE OF LASER N/A 6/19/2018    Procedure: TURP, WITHOUT USING LASER;  Surgeon: Cooper Cordova IV, MD;  Location: [facility] OR;  Service: Urology;  Laterality: N/A;    TRIGGER FINGER RELEASE Left        Review of patient's allergies indicates:   Allergen Reactions    Lipitor [atorvastatin] Other (See Comments)     Muscle aches and pains as well as fatigue.     Niacin preparations Other (See Comments)     Causes broken blood vessels and bruises at 4 times normal dose.    Statins-hmg-coa reductase inhibitors Other (See Comments)     Statins cause intolerable myalgias.    Iodinated contrast media Hives      Tachycardia    Omeprazole Hives and Itching    Prilosec [omeprazole magnesium] Hives and Itching     Family History       Problem Relation (Age of Onset)    Heart disease Mother, Father, Brother          Tobacco Use    Smoking status: Former     Current packs/day: 0.00     Average packs/day: 0.5 packs/day for 4.0 years (2.0 ttl pk-yrs)     Types: Cigarettes     Start date: 1968     Quit date: 1972     Years since quittin.9    Smokeless tobacco: Former     Types: Chew     Quit date: 1976   Substance and Sexual Activity    Alcohol use: Yes     Alcohol/week: 2.0 standard drinks of alcohol     Types: 2 Cans of beer per week    Drug use: No    Sexual activity: Yes     Partners: Female     Review of Systems   Gastrointestinal:  Positive for blood in stool. Negative for constipation, diarrhea and nausea.     Objective:     Vital Signs (Most Recent):  Temp: 98.2 °F (36.8 °C) (24 1557)  Pulse: 80 (24 1557)  Resp: 18 (24 1557)  BP: (!) 149/67 (24 1557)  SpO2: 97 % (24 1557) Vital Signs (24h Range):  Temp:  [98.1 °F (36.7 °C)-98.2 °F (36.8 °C)] 98.2 °F (36.8 °C)  Pulse:  [70-80] 80  Resp:  [14-18] 18  SpO2:  [97 %-100 %] 97 %  BP: (115-149)/(58-67) 149/67     Weight: 98 kg (216 lb 0.8 oz) (24 1138)  Body mass index is 33.84 kg/m².    No intake or output data in the 24 hours ending 24 1702    Lines/Drains/Airways       Peripheral Intravenous Line  Duration                  Peripheral IV - Single Lumen 24 1222 20 G Right Antecubital <1 day                     Physical Exam  Constitutional:       Appearance: Normal appearance.   HENT:      Head: Normocephalic.   Eyes:      Conjunctiva/sclera: Conjunctivae normal.   Pulmonary:      Effort: Pulmonary effort is normal.   Abdominal:      General: There is no distension.      Tenderness: There is no abdominal tenderness.   Genitourinary:     Comments: EDGAR with no stool in rectal vault  Neurological:      Mental  "Status: He is alert.          Significant Labs:  CBC:   Recent Labs   Lab 04/28/24  1204 04/28/24  1613   WBC 3.41* 3.45*   HGB 9.4* 9.6*   HCT 29.0* 30.5*    168     CMP:   Recent Labs   Lab 04/28/24  1204   *   CALCIUM 9.0   ALBUMIN 3.5   PROT 6.7      K 4.0   CO2 24      BUN 21   CREATININE 1.2   ALKPHOS 69   ALT 11   AST 17   BILITOT 0.3     Coagulation: No results for input(s): "PT", "INR", "APTT" in the last 48 hours.    Significant Imaging:  Imaging results within the past 24 hours have been reviewed.  Assessment/Plan:     GI  * Melena  Plan:   -Patient states he had one black stool this am.   -No more bowel movements since being in the ED  -EDGAR was negative   -Last dose of Xarelto was last night.   -Clear liquid diet for now.   -We will monitor him for more episodes of melena  -Continue to hold Xarelto   -PPI IV BID   -Transfuse for hemoglobin > 7         Thank you for your consult. I will follow-up with patient. Please contact us if you have any additional questions.    Eder Foley MD  Gastroenterology  [facility]  "

## 2024-04-28 NOTE — ASSESSMENT & PLAN NOTE
- hx of esophageal varices; EGD (01/17/24): Grade I esophageal varices without bleeding and gastritis.   - hgb on 4/9 11, today 9.4  - Stool OCB +   - GI consulted  - PPI IV BID and NPO 0000 for possible scope  - trend hgb

## 2024-04-28 NOTE — SUBJECTIVE & OBJECTIVE
Past Medical History:   Diagnosis Date    Anticoagulant long-term use     Aortic stenosis     Asthma     Benign prostatic hyperplasia with nocturia     Bilateral carotid artery stenosis 07/21/2021    US CAROTID BILATERAL 07/14/2021 IMPRESSION: Atherosclerosis with suspected stenosis greater than 50% at the right proximal ICA visually. Velocities are discordant and appear improved from prior. Atherosclerosis on the left with no evidence of flow-limiting stenosis greater than 50%.  FINDINGS: Right: Internal Carotid Artery (ICA): Peak systolic velocity 118 cm/sec. 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 09/22/2020    DELONTE on CPAP     Osteoarthritis     Paroxysmal atrial fibrillation     PVD (peripheral vascular disease)     Secondary esophageal varices without bleeding 06/26/2017    Type 2 diabetes mellitus        Past Surgical History:   Procedure Laterality Date    ANGIOGRAM, EXTREMITY, UNILATERAL Left 1/4/2024    Procedure: ANGIOGRAM, EXTREMITY, UNILATERAL- Femoral / SMS Stent, Poss General;  Surgeon: ALEX Alfred III, MD;  Location: [facility] OR [address] [place] [address];  Service: Vascular;  Laterality: Left;  mGy : 2698.78  Gy.cm : 229.88  Contrast : 62ml  Fluro time : 13.8min    CARDIAC CATHETERIZATION      CARDIAC SURGERY      CARPAL TUNNEL RELEASE Left     CATARACT EXTRACTION      CATARACT EXTRACTION W/ INTRAOCULAR LENS  IMPLANT, BILATERAL  2008    CATHETERIZATION OF BOTH LEFT AND RIGHT HEART N/A 11/2/2018    Procedure: CATHETERIZATION, HEART, BOTH  LEFT AND RIGHT;  Surgeon: Frank Gallardo MD;  Location: [facility] CATH LAB;  Service: Cardiology;  Laterality: N/A;    COLONOSCOPY  8/2013    COLONOSCOPY N/A 5/30/2016    Procedure: COLONOSCOPY;  Surgeon: Anna Tomas MD;  Location: [facility] ENDO;  Service: Endoscopy;  Laterality: N/A;    COLONOSCOPY N/A 7/17/2019    Procedure: COLONOSCOPY;  Surgeon: David Carter III, MD;  Location: [facility] ENDO;  Service: Endoscopy;  Laterality: N/A;    COLONOSCOPY N/A 12/14/2023    Procedure: COLONOSCOPY;  Surgeon: Ama Barrera MD;  Location: [facility] ENDO;  Service: Endoscopy;  Laterality: N/A;    COMPUTED TOMOGRAPHY N/A 9/9/2019    Procedure: CT (COMPUTED TOMOGRAPHY);  Surgeon: [facility] Provider;  Location: [facility];  Service: General;  Laterality: N/A;  Microwave ablation to be done in CT.  Need CRNA and cart    COMPUTED TOMOGRAPHY N/A 1/25/2022    Procedure: Liver Microwave Ablation;  Surgeon: Red Puentes MD;  Location: [facility];  Service: General;  Laterality: N/A;    CORONARY ARTERY BYPASS GRAFT (CABG) N/A 11/5/2018    Procedure: CORONARY ARTERY BYPASS GRAFT (CABG);  Surgeon: Bear De Luna MD;  Location: [facility];  Service: Cardiothoracic;  Laterality: N/A;  TWO VESSEL BYPASS WITH AORTOTOMY AND EXCISION OF ATHEROSCLEROTIC PLAQUE    CORONARY BYPASS GRAFT ANGIOGRAPHY  3/2/2020    Procedure: Bypass graft study;  Surgeon: Cora Cormier MD;  Location: [facility] CATH LAB;  Service: Cardiology;;    ELBOW BURSA SURGERY Right 2010    ENDOSCOPIC HARVEST OF VEIN Left 11/5/2018    Procedure: SURGICAL PROCUREMENT, VEIN, ENDOSCOPIC;  Surgeon: Bear De Luna MD;  Location: [facility];  Service: Cardiothoracic;  Laterality: Left;    ESOPHAGOGASTRODUODENOSCOPY N/A 7/17/2019    Procedure: ESOPHAGOGASTRODUODENOSCOPY (EGD);  Surgeon: David Carter III, MD;  Location: [facility];  Service: Endoscopy;  Laterality: N/A;    ESOPHAGOGASTRODUODENOSCOPY N/A 12/29/2022    Procedure: ESOPHAGOGASTRODUODENOSCOPY (EGD);  Surgeon: Issa CARLSON  MD Irwin;  Location: [facility] ENDO;  Service: Endoscopy;  Laterality: N/A;    ESOPHAGOGASTRODUODENOSCOPY N/A 1/17/2024    Procedure: EGD (ESOPHAGOGASTRODUODENOSCOPY);  Surgeon: Issa Gayle MD;  Location: [facility] ENDO;  Service: Endoscopy;  Laterality: N/A;    ETHMOIDECTOMY Bilateral 3/27/2019    Procedure: ETHMOIDECTOMY;  Surgeon: Alejandro Parkinson MD;  Location: [facility] OR;  Service: ENT;  Laterality: Bilateral;    EYE SURGERY      FOOT SURGERY      FRONTAL SINUS OBLITERATION Bilateral 3/27/2019    Procedure: SINUSOTOMY, FRONTAL SINUS, OBLITERATIVE;  Surgeon: Alejandro Parkinson MD;  Location: [facility] OR;  Service: ENT;  Laterality: Bilateral;    FUNCTIONAL ENDOSCOPIC SINUS SURGERY (FESS) Bilateral 3/27/2019    Procedure: FESS (FUNCTIONAL ENDOSCOPIC SINUS SURGERY);  Surgeon: Alejandro Parkinson MD;  Location: [facility] OR;  Service: ENT;  Laterality: Bilateral;  Left Keila bullosa resection     INTRALUMINAL GASTROINTESTINAL TRACT IMAGING VIA CAPSULE N/A 2/2/2024    Procedure: IMAGING PROCEDURE, GI TRACT, INTRALUMINAL, VIA CAPSULE;  Surgeon: Cooper Estrella RN;  Location: [facility];  Service: Endoscopy;  Laterality: N/A;    KNEE ARTHROSCOPY W/ MENISCAL REPAIR Left 06/2019    dr boykin    KNEE SURGERY Right     LEFT HEART CATHETERIZATION Left 3/2/2020    Procedure: CATHETERIZATION, HEART, LEFT;  Surgeon: Cora Cormier MD;  Location: [facility] CATH LAB;  Service: Cardiology;  Laterality: Left;    LIVER BIOPSY  01/2022    MAXILLARY ANTROSTOMY Bilateral 3/27/2019    Procedure: MAXILLARY ANTROSTOMY;  Surgeon: Alejandro Parkinson MD;  Location: [facility] OR;  Service: ENT;  Laterality: Bilateral;    NASAL SEPTOPLASTY N/A 3/27/2019    Procedure: SEPTOPLASTY, NOSE;  Surgeon: Alejandro Parkinson MD;  Location: [facility] OR;  Service: ENT;  Laterality: N/A;    RECTAL SURGERY  2012    STENT, SUPERIOR MESENTERIC ARTERY Left 1/4/2024    Procedure: STENT, SUPERIOR MESENTERIC ARTERY;  Surgeon: ALEX Alfred III, MD;  Location: [address];  Service: Vascular;  Laterality: Left;     TRANSURETHRAL RESECTION OF PROSTATE (TURP) WITHOUT USE OF LASER N/A 6/19/2018    Procedure: TURP, WITHOUT USING LASER;  Surgeon: Cooper Cordova IV, MD;  Location: [facility];  Service: Urology;  Laterality: N/A;    TRIGGER FINGER RELEASE Left        Review of patient's allergies indicates:   Allergen Reactions    Lipitor [atorvastatin] Other (See Comments)     Muscle aches and pains as well as fatigue.     Niacin preparations Other (See Comments)     Causes broken blood vessels and bruises at 4 times normal dose.    Statins-hmg-coa reductase inhibitors Other (See Comments)     Statins cause intolerable myalgias.    Iodinated contrast media Hives     Tachycardia    Omeprazole Hives and Itching    Prilosec [omeprazole magnesium] Hives and Itching       Current Facility-Administered Medications   Medication Dose Route Frequency Provider Last Rate Last Admin    carvediloL tablet 12.5 mg  12.5 mg Oral BID WM Halina Villaseñor, NP        dextrose 10% bolus 125 mL 125 mL  12.5 g Intravenous PRN Halina Villaseñor, NP        dextrose 10% bolus 250 mL 250 mL  25 g Intravenous PRN Halina Villaseñor, NP        finasteride tablet 5 mg  5 mg Oral Nightly Halina Villaseñor, NP        glucagon (human recombinant) injection 1 mg  1 mg Intramuscular PRN Halina Villaseñor, NP        glucose chewable tablet 16 g  16 g Oral PRN Halina Villaseñor, NP        glucose chewable tablet 24 g  24 g Oral PRN Halina Villaseñor, NP        insulin aspart U-100 pen 0-5 Units  0-5 Units Subcutaneous QID (AC + HS) PRN Halina Villaseñor, NP        [START ON 4/29/2024] levothyroxine tablet 300 mcg  300 mcg Oral Before breakfast Halina Villaseñor, NP        naloxone 0.4 mg/mL injection 0.02 mg  0.02 mg Intravenous PRN Halina Villaseñor, NP        ondansetron injection 4 mg  4 mg Intravenous Q8H PRN Halina Villaseñor, NP        pantoprazole injection 40 mg  40 mg Intravenous BID Halina Villaseñor, NP   40 mg at 04/28/24 9840     propafenone tablet 150 mg  150 mg Oral Q8H Halina Villaseñor NP        ranolazine 12 hr tablet 1,000 mg  1,000 mg Oral BID Halina Villaseñor NP        sodium chloride 0.9% flush 10 mL  10 mL Intravenous Q12H PRN Halina Villaseñor NP         Current Outpatient Medications   Medication Sig Dispense Refill    aspirin (ECOTRIN) 81 MG EC tablet Take 1 tablet (81 mg total) by mouth once daily. 90 tablet 3    carvediloL (COREG) 12.5 MG tablet Take 1 tablet (12.5 mg total) by mouth 2 (two) times daily with meals. 60 tablet 2    finasteride (PROSCAR) 5 mg tablet TAKE 1 TABLET BY MOUTH once daily (Patient taking differently: Take 5 mg by mouth nightly.) 90 tablet 2    furosemide (LASIX) 40 MG tablet Take 1 tablet (40 mg total) by mouth 2 (two) times daily. 60 tablet 11    insulin (LANTUS SOLOSTAR U-100 INSULIN) glargine 100 units/mL SubQ pen Inject 44 Units into the skin every evening. 45 mL 1    JARDIANCE 25 mg tablet TAKE 1 TABLET BY MOUTH EVERY DAY 90 tablet 0    levocetirizine (XYZAL) 5 MG tablet Take 1 tablet (5 mg total) by mouth every evening. 30 tablet 11    levothyroxine (SYNTHROID) 300 MCG Tab TAKE 1 TABLET BY MOUTH EVERY MORNING 90 tablet 3    levothyroxine (SYNTHROID) 50 MCG tablet Take 1 tablet (50 mcg total) by mouth before breakfast. 90 tablet 3    lisinopriL 10 MG tablet TAKE 1 BY MOUTH EVERY DAY 30 tablet 7    metFORMIN (GLUCOPHAGE-XR) 500 MG ER 24hr tablet Take 1 tablet (500 mg total) by mouth 2 (two) times daily with meals. 180 tablet 3    mirabegron (MYRBETRIQ) 25 mg Tb24 ER tablet Take 1 tablet (25 mg total) by mouth once daily. 30 tablet 11    montelukast (SINGULAIR) 10 mg tablet Take 1 tablet (10 mg total) by mouth once daily. 90 tablet 3    propafenone (RHTHYMOL) 150 MG Tab Take 1 tablet (150 mg total) by mouth every 8 (eight) hours. 90 tablet 11    RABEprazole (ACIPHEX) 20 mg tablet Take 1 tablet (20 mg total) by mouth 2 (two) times daily. 180 tablet 3    ranolazine (RANEXA) 1,000 mg Tb12  "Take 1 tablet (1,000 mg total) by mouth 2 (two) times daily. 60 tablet 2    rivaroxaban (XARELTO) 20 mg Tab Take 1 tablet (20 mg total) by mouth daily with dinner or evening meal. 30 tablet 11    ACCU-CHEK GRACE PLUS METER Misc AS DIRECTED 1 each 0    albuterol (VENTOLIN HFA) 90 mcg/actuation inhaler Inhale 2 puffs into the lungs every 6 (six) hours as needed for Wheezing or Shortness of Breath. Rescue 18 g 3    amLODIPine (NORVASC) 10 MG tablet TAKE 1 TABLET BY MOUTH ONCE A DAY (DOSE INCREASE) (Patient not taking: Reported on 2024) 30 tablet 11    blood sugar diagnostic (ACCU-CHEK GRACE PLUS TEST STRP) Strp TEST THREE TIMES A  strip 11    budesonide-formoterol 160-4.5 mcg (SYMBICORT) 160-4.5 mcg/actuation HFAA INHALE 2 PUFFS INTO THE LUNGS TWO TIMES A DAY. 10.2 g 11    GLUCOSAMINE HCL/CHONDR ROSARIO A NA (OSTEO BI-FLEX ORAL) Take 1 tablet by mouth 2 (two) times daily.       lancets (ACCU-CHEK FASTCLIX LANCET DRUM) Misc TEST TWO TIMES A  each 5    pen needle, diabetic (BD ULTRA-FINE MINI PEN NEEDLE) 31 gauge x 3/16" Ndle USE AS DIRECTED 100 each 11    sildenafiL (VIAGRA) 100 MG tablet Take 1/2 to 1 tablet by mouth one hour prior to intercourse. Max 100mg per day 30 tablet 11     Facility-Administered Medications Ordered in Other Encounters   Medication Dose Route Frequency Provider Last Rate Last Admin    lactated ringers infusion   Intravenous Continuous Omaira Theodore MD   New Bag at 19 1117     Family History       Problem Relation (Age of Onset)    Heart disease Mother, Father, Brother          Tobacco Use    Smoking status: Former     Current packs/day: 0.00     Average packs/day: 0.5 packs/day for 4.0 years (2.0 ttl pk-yrs)     Types: Cigarettes     Start date: 1968     Quit date: 1972     Years since quittin.9    Smokeless tobacco: Former     Types: Chew     Quit date: 1976   Substance and Sexual Activity    Alcohol use: Yes     Alcohol/week: 2.0 standard " drinks of alcohol     Types: 2 Cans of beer per week    Drug use: No    Sexual activity: Yes     Partners: Female     Review of Systems   Gastrointestinal:         Dark stools     Objective:     Vital Signs (Most Recent):  Temp: 98.1 °F (36.7 °C) (04/28/24 1138)  Pulse: 70 (04/28/24 1417)  Resp: 16 (04/28/24 1417)  BP: 130/63 (04/28/24 1417)  SpO2: 100 % (04/28/24 1417) Vital Signs (24h Range):  Temp:  [98.1 °F (36.7 °C)] 98.1 °F (36.7 °C)  Pulse:  [70-76] 70  Resp:  [14-18] 16  SpO2:  [97 %-100 %] 100 %  BP: (115-134)/(58-63) 130/63     Weight: 98 kg (216 lb 0.8 oz)  Body mass index is 33.84 kg/m².     Physical Exam  Vitals and nursing note reviewed.   Constitutional:       Appearance: Normal appearance.   Cardiovascular:      Rate and Rhythm: Normal rate and regular rhythm.      Pulses: Normal pulses.      Heart sounds: Normal heart sounds.   Pulmonary:      Effort: Pulmonary effort is normal.      Breath sounds: Normal breath sounds. No wheezing.   Abdominal:      General: Bowel sounds are normal. There is no distension.      Palpations: Abdomen is soft.      Tenderness: There is no abdominal tenderness.   Musculoskeletal:         General: Normal range of motion.   Skin:     General: Skin is warm and dry.   Neurological:      Mental Status: He is alert and oriented to person, place, and time.                Significant Labs: All pertinent labs within the past 24 hours have been reviewed.    Significant Imaging: I have reviewed all pertinent imaging results/findings within the past 24 hours.

## 2024-04-28 NOTE — HPI
Mr Pretty is a 78 year old that presented to ED for one black stool that occurred this morning. His history is significant for SMA stenting 01/05/2024, liver cirrhosis and HCC (tx'd w/ ablation) with small esophageal varices and gastritis. He was admitted 01/16/24 for the same after starting ASA and Plavix following his stent placement. An EGD (01/17/24) showed grade I esophageal varices without bleeding and gastritis.    EGD (01/17/24): Grade I esophageal varices without bleeding and gastritis.   Colonoscopy (12/2023): colon polyps and ischemic ulcers which led to the recent SMA stenting.     VCE (2/2024) was normal.     Hemoglobin on arrival to ED was 9.4 (11 two weeks ago).   He is on aspirin and xarelto. Last dose of xarelto was last night.

## 2024-04-28 NOTE — PLAN OF CARE
Pt remains free from falls/injuries this shift. Safety precautions maintained. Pain managed with relaxation. Tele monitoring per orders. No s/s of acute distress noted. Will continue to monitor. Chart check completed.

## 2024-04-28 NOTE — ED PROVIDER NOTES
"SCRIBE #1 NOTE: I, Jazlyn Hannah, am scribing for, and in the presence of, Martha Toscano MD. I have scribed the entire note.       History     Chief Complaint   Patient presents with    Rectal Bleeding     Pt reports hx multiple GI bleeds, c/o dark black stool starting this AM, denies weakness/pain at present     Review of patient's allergies indicates:   Allergen Reactions    Lipitor [atorvastatin] Other (See Comments)     Muscle aches and pains as well as fatigue.     Niacin preparations Other (See Comments)     Causes broken blood vessels and bruises at 4 times normal dose.    Statins-hmg-coa reductase inhibitors Other (See Comments)     Statins cause intolerable myalgias.    Iodinated contrast media Hives     Tachycardia    Omeprazole Hives and Itching    Prilosec [omeprazole magnesium] Hives and Itching         History of Present Illness     HPI    4/28/2024, 12:23 PM  History obtained from the patient      History of Present Illness: Erendira Pretty is a 78 y.o. male patient with a PMHx of DM 2, HTN, HLD, CAD, PVD, cirrhosis, COPD and atrial fibrillation who presents to the Emergency Department for evaluation of two episodes of blood in stool described as "black and oily".  which onset this morning. Pt reports intermittent bleeding since stent was place. Pt is on xarelto and baby aspirin but was taken off Plavix.  Symptoms are constant and moderate in severity. No mitigating or exacerbating factors reported. No associated sxs reported. Patient denies any abdominal pain, and all other sxs at this time.  No further complaints or concerns at this time.       Arrival mode: Personal vehicle      PCP: Bhupinder Callaway MD        Past Medical History:  Past Medical History:   Diagnosis Date    Anticoagulant long-term use     Aortic stenosis     Asthma     Benign prostatic hyperplasia with nocturia     Bilateral carotid artery stenosis 07/21/2021    US CAROTID BILATERAL 07/14/2021 IMPRESSION: Atherosclerosis with " suspected stenosis greater than 50% at the right proximal ICA visually. Velocities are discordant and appear improved from prior. Atherosclerosis on the left with no evidence of flow-limiting stenosis greater than 50%.  FINDINGS: Right: Internal Carotid Artery (ICA): Peak systolic velocity 118 cm/sec. End diastolic velocity 35 cm/sec    BPH (benign prostatic hyperplasia)     CAD (coronary artery disease)     40% lesion 2002;violet    Cirrhosis     Claudication 04/09/2014    Colon polyp     COPD (chronic obstructive pulmonary disease)     ED (erectile dysfunction)     Encounter for blood transfusion     Ex-smoker     Hearing loss NEC     Hepatitis C     Cured;reji brown    Hyperlipidemia     Hypertension     Hypothyroid     s/p tx graves    Open angle with borderline findings and low glaucoma risk in both eyes 09/22/2020    DELONTE on CPAP     Osteoarthritis     Paroxysmal atrial fibrillation     PVD (peripheral vascular disease)     Secondary esophageal varices without bleeding 06/26/2017    Type 2 diabetes mellitus        Past Surgical History:  Past Surgical History:   Procedure Laterality Date    ANGIOGRAM, EXTREMITY, UNILATERAL Left 1/4/2024    Procedure: ANGIOGRAM, EXTREMITY, UNILATERAL- Femoral / SMS Stent, Poss General;  Surgeon: ALEX Alfred III, MD;  Location: [facility] OR [address] [place] [address];  Service: Vascular;  Laterality: Left;  mGy : 2698.78  Gy.cm : 229.88  Contrast : 62ml  Fluro time : 13.8min    CARDIAC CATHETERIZATION      CARDIAC SURGERY      CARPAL TUNNEL RELEASE Left     CATARACT EXTRACTION      CATARACT EXTRACTION W/ INTRAOCULAR LENS  IMPLANT, BILATERAL  2008    CATHETERIZATION OF BOTH LEFT AND RIGHT HEART N/A 11/2/2018    Procedure: CATHETERIZATION, HEART, BOTH LEFT AND RIGHT;  Surgeon: Frank Gallardo MD;  Location: [facility] CATH LAB;  Service: Cardiology;  Laterality: N/A;    COLONOSCOPY  8/2013    COLONOSCOPY N/A 5/30/2016    Procedure: COLONOSCOPY;  Surgeon: Anna Tomas MD;  Location: [facility]  ENDO;  Service: Endoscopy;  Laterality: N/A;    COLONOSCOPY N/A 7/17/2019    Procedure: COLONOSCOPY;  Surgeon: David Carter III, MD;  Location: [facility] ENDO;  Service: Endoscopy;  Laterality: N/A;    COLONOSCOPY N/A 12/14/2023    Procedure: COLONOSCOPY;  Surgeon: Ama Barrera MD;  Location: [facility] ENDO;  Service: Endoscopy;  Laterality: N/A;    COMPUTED TOMOGRAPHY N/A 9/9/2019    Procedure: CT (COMPUTED TOMOGRAPHY);  Surgeon: [facility];  Location: [facility] OR;  Service: General;  Laterality: N/A;  Microwave ablation to be done in CT.  Need CRNA and cart    COMPUTED TOMOGRAPHY N/A 1/25/2022    Procedure: Liver Microwave Ablation;  Surgeon: Red Puentes MD;  Location: [facility];  Service: General;  Laterality: N/A;    CORONARY ARTERY BYPASS GRAFT (CABG) N/A 11/5/2018    Procedure: CORONARY ARTERY BYPASS GRAFT (CABG);  Surgeon: Bear De Luna MD;  Location: [facility] OR;  Service: Cardiothoracic;  Laterality: N/A;  TWO VESSEL BYPASS WITH AORTOTOMY AND EXCISION OF ATHEROSCLEROTIC PLAQUE    CORONARY BYPASS GRAFT ANGIOGRAPHY  3/2/2020    Procedure: Bypass graft study;  Surgeon: Cora Cormier MD;  Location: [facility] CATH LAB;  Service: Cardiology;;    ELBOW BURSA SURGERY Right 2010    ENDOSCOPIC HARVEST OF VEIN Left 11/5/2018    Procedure: SURGICAL PROCUREMENT, VEIN, ENDOSCOPIC;  Surgeon: Bear De Luna MD;  Location: [facility];  Service: Cardiothoracic;  Laterality: Left;    ESOPHAGOGASTRODUODENOSCOPY N/A 7/17/2019    Procedure: ESOPHAGOGASTRODUODENOSCOPY (EGD);  Surgeon: David Carter III, MD;  Location: [facility] ENDO;  Service: Endoscopy;  Laterality: N/A;    ESOPHAGOGASTRODUODENOSCOPY N/A 12/29/2022    Procedure: ESOPHAGOGASTRODUODENOSCOPY (EGD);  Surgeon: Issa Gayle MD;  Location: [facility] ENDO;  Service: Endoscopy;  Laterality: N/A;    ESOPHAGOGASTRODUODENOSCOPY N/A 1/17/2024    Procedure: EGD (ESOPHAGOGASTRODUODENOSCOPY);  Surgeon: Issa Gayle MD;  Location: [facility];  Service:  Endoscopy;  Laterality: N/A;    ETHMOIDECTOMY Bilateral 3/27/2019    Procedure: ETHMOIDECTOMY;  Surgeon: Alejandro Parkinson MD;  Location: [facility] OR;  Service: ENT;  Laterality: Bilateral;    EYE SURGERY      FOOT SURGERY      FRONTAL SINUS OBLITERATION Bilateral 3/27/2019    Procedure: SINUSOTOMY, FRONTAL SINUS, OBLITERATIVE;  Surgeon: Alejandro Parkinson MD;  Location: [facility] OR;  Service: ENT;  Laterality: Bilateral;    FUNCTIONAL ENDOSCOPIC SINUS SURGERY (FESS) Bilateral 3/27/2019    Procedure: FESS (FUNCTIONAL ENDOSCOPIC SINUS SURGERY);  Surgeon: Alejandro Parkinson MD;  Location: [facility] OR;  Service: ENT;  Laterality: Bilateral;  Left Keila bullosa resection     INTRALUMINAL GASTROINTESTINAL TRACT IMAGING VIA CAPSULE N/A 2/2/2024    Procedure: IMAGING PROCEDURE, GI TRACT, INTRALUMINAL, VIA CAPSULE;  Surgeon: Cooper Estrella RN;  Location: [facility];  Service: Endoscopy;  Laterality: N/A;    KNEE ARTHROSCOPY W/ MENISCAL REPAIR Left 06/2019    dr boykin    KNEE SURGERY Right     LEFT HEART CATHETERIZATION Left 3/2/2020    Procedure: CATHETERIZATION, HEART, LEFT;  Surgeon: Cora Cormier MD;  Location: [facility] CATH LAB;  Service: Cardiology;  Laterality: Left;    LIVER BIOPSY  01/2022    MAXILLARY ANTROSTOMY Bilateral 3/27/2019    Procedure: MAXILLARY ANTROSTOMY;  Surgeon: Alejandro Parkinson MD;  Location: [facility] OR;  Service: ENT;  Laterality: Bilateral;    NASAL SEPTOPLASTY N/A 3/27/2019    Procedure: SEPTOPLASTY, NOSE;  Surgeon: Alejandro Parkinson MD;  Location: [facility] OR;  Service: ENT;  Laterality: N/A;    RECTAL SURGERY  2012    STENT, SUPERIOR MESENTERIC ARTERY Left 1/4/2024    Procedure: STENT, SUPERIOR MESENTERIC ARTERY;  Surgeon: ALEX Alfred III, MD;  Location: [address];  Service: Vascular;  Laterality: Left;    TRANSURETHRAL RESECTION OF PROSTATE (TURP) WITHOUT USE OF LASER N/A 6/19/2018    Procedure: TURP, WITHOUT USING LASER;  Surgeon: Cooper Cordova IV, MD;  Location: [facility] OR;  Service: Urology;  Laterality: N/A;    TRIGGER FINGER RELEASE  Left          Family History:  Family History   Problem Relation Name Age of Onset    Heart disease Mother      Heart disease Father      Heart disease Brother      Colon cancer Neg Hx         Social History:  Social History     Tobacco Use    Smoking status: Former     Current packs/day: 0.00     Average packs/day: 0.5 packs/day for 4.0 years (2.0 ttl pk-yrs)     Types: Cigarettes     Start date: 1968     Quit date: 1972     Years since quittin.9    Smokeless tobacco: Former     Types: Chew     Quit date: 1976   Substance and Sexual Activity    Alcohol use: Yes     Alcohol/week: 2.0 standard drinks of alcohol     Types: 2 Cans of beer per week    Drug use: No    Sexual activity: Yes     Partners: Female        Review of Systems     Review of Systems   Gastrointestinal:  Positive for blood in stool. Negative for abdominal pain.      Physical Exam     Initial Vitals [24 1138]   BP Pulse Resp Temp SpO2   134/63 72 18 98.1 °F (36.7 °C) 99 %      MAP       --          Physical Exam  Nursing Notes and Vital Signs Reviewed.  Constitutional: Patient is in no acute distress. Well-developed and well-nourished.  Head: Atraumatic. Normocephalic.  Eyes: PERRL. EOM intact. Conjunctivae are not pale. No scleral icterus.  ENT: Mucous membranes are moist. Oropharynx is clear and symmetric.    Neck: Supple. Full ROM. No lymphadenopathy.  Cardiovascular: Regular rate. Regular rhythm. No murmurs, rubs, or gallops. Distal pulses are 2+ and symmetric.  Pulmonary/Chest: No respiratory distress. Clear to auscultation bilaterally. No wheezing or rales.  Abdominal: Soft and non-distended.  There is no tenderness.  No rebound, guarding, or rigidity.  Rectal: There is no tenderness. No masses or hemorrhoids. Normal sphincter tone. The stool color is black and tarry.  Genitourinary: No CVA tenderness  Musculoskeletal: Moves all extremities. No obvious deformities. No edema. No calf tenderness.  Skin: Warm and  "dry.  Neurological:  Alert, awake, and appropriate.  Normal speech.  No acute focal neurological deficits are appreciated.  Psychiatric: Normal affect. Good eye contact. Appropriate in content.     ED Course   Critical Care    Date/Time: 4/28/2024 3:10 PM    Performed by: Martha Toscano MD  Authorized by: Martha Toscano MD  Direct patient critical care time: 15 minutes  Additional history critical care time: 10 minutes  Ordering / reviewing critical care time: 10 minutes  Documentation critical care time: 5 minutes  Consulting other physicians critical care time: 5 minutes  Total critical care time (exclusive of procedural time) : 45 minutes  Critical care time was exclusive of separately billable procedures and treating other patients and teaching time.  Critical care was necessary to treat or prevent imminent or life-threatening deterioration of the following conditions: acute upper GI bleed.  Critical care was time spent personally by me on the following activities: blood draw for specimens, development of treatment plan with patient or surrogate, discussions with consultants, evaluation of patient's response to treatment, interpretation of cardiac output measurements, examination of patient, obtaining history from patient or surrogate, ordering and performing treatments and interventions, ordering and review of laboratory studies, pulse oximetry, ordering and review of radiographic studies, re-evaluation of patient's condition and review of old charts.        ED Vital Signs:  Vitals:    04/28/24 1138 04/28/24 1301 04/28/24 1333 04/28/24 1417   BP: 134/63 115/60 (!) 125/58 130/63   Pulse: 72 71 76 70   Resp: 18 14 16 16   Temp: 98.1 °F (36.7 °C)      SpO2: 99% 97% 100% 100%   Weight: 98 kg (216 lb 0.8 oz)      Height: 5' 7" (1.702 m)          Abnormal Lab Results:  Labs Reviewed   CBC W/ AUTO DIFFERENTIAL - Abnormal; Notable for the following components:       Result Value    WBC 3.41 (*)     RBC 3.37 (*)     " Hemoglobin 9.4 (*)     Hematocrit 29.0 (*)     RDW 24.6 (*)     Lymph # 0.7 (*)     All other components within normal limits   COMPREHENSIVE METABOLIC PANEL - Abnormal; Notable for the following components:    Glucose 158 (*)     Anion Gap 7 (*)     All other components within normal limits   OCCULT BLOOD X 1, STOOL - Abnormal; Notable for the following components:    Occult Blood Positive (*)     All other components within normal limits   CBC W/ AUTO DIFFERENTIAL   TYPE & SCREEN        All Lab Results:  Results for orders placed or performed during the hospital encounter of 04/28/24   CBC auto differential   Result Value Ref Range    WBC 3.41 (L) 3.90 - 12.70 K/uL    RBC 3.37 (L) 4.60 - 6.20 M/uL    Hemoglobin 9.4 (L) 14.0 - 18.0 g/dL    Hematocrit 29.0 (L) 40.0 - 54.0 %    MCV 86 82 - 98 fL    MCH 27.9 27.0 - 31.0 pg    MCHC 32.4 32.0 - 36.0 g/dL    RDW 24.6 (H) 11.5 - 14.5 %    Platelets 173 150 - 450 K/uL    MPV 9.3 9.2 - 12.9 fL    Immature Granulocytes 0.3 0.0 - 0.5 %    Gran # (ANC) 2.2 1.8 - 7.7 K/uL    Immature Grans (Abs) 0.01 0.00 - 0.04 K/uL    Lymph # 0.7 (L) 1.0 - 4.8 K/uL    Mono # 0.4 0.3 - 1.0 K/uL    Eos # 0.1 0.0 - 0.5 K/uL    Baso # 0.03 0.00 - 0.20 K/uL    nRBC 0 0 /100 WBC    Gran % 63.6 38.0 - 73.0 %    Lymph % 21.4 18.0 - 48.0 %    Mono % 12.0 4.0 - 15.0 %    Eosinophil % 1.8 0.0 - 8.0 %    Basophil % 0.9 0.0 - 1.9 %    Differential Method Automated    Comprehensive metabolic panel   Result Value Ref Range    Sodium 138 136 - 145 mmol/L    Potassium 4.0 3.5 - 5.1 mmol/L    Chloride 107 95 - 110 mmol/L    CO2 24 23 - 29 mmol/L    Glucose 158 (H) 70 - 110 mg/dL    BUN 21 8 - 23 mg/dL    Creatinine 1.2 0.5 - 1.4 mg/dL    Calcium 9.0 8.7 - 10.5 mg/dL    Total Protein 6.7 6.0 - 8.4 g/dL    Albumin 3.5 3.5 - 5.2 g/dL    Total Bilirubin 0.3 0.1 - 1.0 mg/dL    Alkaline Phosphatase 69 55 - 135 U/L    AST 17 10 - 40 U/L    ALT 11 10 - 44 U/L    eGFR >60 >60 mL/min/1.73 m^2    Anion Gap 7 (L) 8 - 16  mmol/L   Occult blood x 1, stool    Specimen: Stool   Result Value Ref Range    Occult Blood Positive (A) Negative   Type & Screen   Result Value Ref Range    Group & Rh O POS     Indirect Rock NEG     Specimen Outdate 05/01/2024 23:59      *Note: Due to a large number of results and/or encounters for the requested time period, some results have not been displayed. A complete set of results can be found in Results Review.         Imaging Results:  Imaging Results    None          The EKG was ordered, reviewed, and independently interpreted by the ED provider.  Interpretation time: 13:43  Rate: 70 BPM  Rhythm:  Sinus rhythm with sinus arrhythmia with 1st degree AV block  Interpretation: No acute ST or T wave abnormalities. No STEMI.             The Emergency Provider reviewed the vital signs and test results, which are outlined above.     ED Discussion     2:33 PM: Discussed case with Dr. Cordova ([facility]). Dr. Cordova agrees with current care and management of pt and accepts admission.   Admitting Service: [facility] medicine   Admitting Physician: Dr. Cordova  Admit to: Obs     2:33 PM: Re-evaluated pt. I have discussed test results, shared treatment plan, and the need for admission with patient and family at bedside. Pt and family express understanding at this time and agree with all information. All questions answered. Pt and family have no further questions or concerns at this time. Pt is ready for admit.           Medical Decision Making  Amount and/or Complexity of Data Reviewed  Labs: ordered. Decision-making details documented in ED Course.  ECG/medicine tests: ordered and independent interpretation performed. Decision-making details documented in ED Course.                ED Medication(s):  Medications   pantoprazole injection 40 mg (40 mg Intravenous Given 4/28/24 7385)   carvediloL tablet 12.5 mg (has no administration in time range)   finasteride tablet 5 mg (has no administration in time range)    levothyroxine tablet 300 mcg (has no administration in time range)   propafenone tablet 150 mg (has no administration in time range)   ranolazine 12 hr tablet 1,000 mg (has no administration in time range)   sodium chloride 0.9% flush 10 mL (has no administration in time range)   naloxone 0.4 mg/mL injection 0.02 mg (has no administration in time range)   glucose chewable tablet 16 g (has no administration in time range)   glucose chewable tablet 24 g (has no administration in time range)   glucagon (human recombinant) injection 1 mg (has no administration in time range)   ondansetron injection 4 mg (has no administration in time range)   insulin aspart U-100 pen 0-5 Units (has no administration in time range)   dextrose 10% bolus 125 mL 125 mL (has no administration in time range)   dextrose 10% bolus 250 mL 250 mL (has no administration in time range)       New Prescriptions    No medications on file               Scribe Attestation:   Scribe #1: I performed the above scribed service and the documentation accurately describes the services I performed. I attest to the accuracy of the note.     Attending:   Physician Attestation Statement for Scribe #1: I, Martha Toscano MD, personally performed the services described in this documentation, as scribed by Jazlyn Young, in my presence, and it is both accurate and complete.           Clinical Impression       ICD-10-CM ICD-9-CM   1. A-fib  I48.91 427.31   2. UGI bleed  K92.2 578.9   3. Chest pain  R07.9 786.50       Disposition:   Disposition: Placed in Observation  Condition: Serious         Martha Toscano MD  04/28/24 1528

## 2024-04-28 NOTE — SUBJECTIVE & OBJECTIVE
Past Medical History:   Diagnosis Date    Anticoagulant long-term use     Aortic stenosis     Asthma     Benign prostatic hyperplasia with nocturia     Bilateral carotid artery stenosis 07/21/2021    US CAROTID BILATERAL 07/14/2021 IMPRESSION: Atherosclerosis with suspected stenosis greater than 50% at the right proximal ICA visually. Velocities are discordant and appear improved from prior. Atherosclerosis on the left with no evidence of flow-limiting stenosis greater than 50%.  FINDINGS: Right: Internal Carotid Artery (ICA): Peak systolic velocity 118 cm/sec. 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 09/22/2020    DELONTE on CPAP     Osteoarthritis     Paroxysmal atrial fibrillation     PVD (peripheral vascular disease)     Secondary esophageal varices without bleeding 06/26/2017    Type 2 diabetes mellitus        Past Surgical History:   Procedure Laterality Date    ANGIOGRAM, EXTREMITY, UNILATERAL Left 1/4/2024    Procedure: ANGIOGRAM, EXTREMITY, UNILATERAL- Femoral / SMS Stent, Poss General;  Surgeon: ALEX Alfred III, MD;  Location: [facility] OR [address] [place] [address];  Service: Vascular;  Laterality: Left;  mGy : 2698.78  Gy.cm : 229.88  Contrast : 62ml  Fluro time : 13.8min    CARDIAC CATHETERIZATION      CARDIAC SURGERY      CARPAL TUNNEL RELEASE Left     CATARACT EXTRACTION      CATARACT EXTRACTION W/ INTRAOCULAR LENS  IMPLANT, BILATERAL  2008    CATHETERIZATION OF BOTH LEFT AND RIGHT HEART N/A 11/2/2018    Procedure: CATHETERIZATION, HEART, BOTH  LEFT AND RIGHT;  Surgeon: Frank Gallardo MD;  Location: [facility] CATH LAB;  Service: Cardiology;  Laterality: N/A;    COLONOSCOPY  8/2013    COLONOSCOPY N/A 5/30/2016    Procedure: COLONOSCOPY;  Surgeon: Anna Tomas MD;  Location: [facility] ENDO;  Service: Endoscopy;  Laterality: N/A;    COLONOSCOPY N/A 7/17/2019    Procedure: COLONOSCOPY;  Surgeon: David Carter III, MD;  Location: [facility] ENDO;  Service: Endoscopy;  Laterality: N/A;    COLONOSCOPY N/A 12/14/2023    Procedure: COLONOSCOPY;  Surgeon: Ama Barrera MD;  Location: [facility] ENDO;  Service: Endoscopy;  Laterality: N/A;    COMPUTED TOMOGRAPHY N/A 9/9/2019    Procedure: CT (COMPUTED TOMOGRAPHY);  Surgeon: [facility] Diagnostic Provider;  Location: [facility];  Service: General;  Laterality: N/A;  Microwave ablation to be done in CT.  Need CRNA and cart    COMPUTED TOMOGRAPHY N/A 1/25/2022    Procedure: Liver Microwave Ablation;  Surgeon: Red Puentes MD;  Location: [facility];  Service: General;  Laterality: N/A;    CORONARY ARTERY BYPASS GRAFT (CABG) N/A 11/5/2018    Procedure: CORONARY ARTERY BYPASS GRAFT (CABG);  Surgeon: Bear De Luna MD;  Location: [facility];  Service: Cardiothoracic;  Laterality: N/A;  TWO VESSEL BYPASS WITH AORTOTOMY AND EXCISION OF ATHEROSCLEROTIC PLAQUE    CORONARY BYPASS GRAFT ANGIOGRAPHY  3/2/2020    Procedure: Bypass graft study;  Surgeon: Cora Cormier MD;  Location: [facility] CATH LAB;  Service: Cardiology;;    ELBOW BURSA SURGERY Right 2010    ENDOSCOPIC HARVEST OF VEIN Left 11/5/2018    Procedure: SURGICAL PROCUREMENT, VEIN, ENDOSCOPIC;  Surgeon: Bear De Luna MD;  Location: [facility];  Service: Cardiothoracic;  Laterality: Left;    ESOPHAGOGASTRODUODENOSCOPY N/A 7/17/2019    Procedure: ESOPHAGOGASTRODUODENOSCOPY (EGD);  Surgeon: David Carter III, MD;  Location: [facility];  Service: Endoscopy;  Laterality: N/A;    ESOPHAGOGASTRODUODENOSCOPY N/A 12/29/2022    Procedure: ESOPHAGOGASTRODUODENOSCOPY (EGD);  Surgeon: Issa CARLSON  MD Irwin;  Location: [facility] ENDO;  Service: Endoscopy;  Laterality: N/A;    ESOPHAGOGASTRODUODENOSCOPY N/A 1/17/2024    Procedure: EGD (ESOPHAGOGASTRODUODENOSCOPY);  Surgeon: Issa Gayle MD;  Location: [facility] ENDO;  Service: Endoscopy;  Laterality: N/A;    ETHMOIDECTOMY Bilateral 3/27/2019    Procedure: ETHMOIDECTOMY;  Surgeon: Alejandro Parkinson MD;  Location: [facility] OR;  Service: ENT;  Laterality: Bilateral;    EYE SURGERY      FOOT SURGERY      FRONTAL SINUS OBLITERATION Bilateral 3/27/2019    Procedure: SINUSOTOMY, FRONTAL SINUS, OBLITERATIVE;  Surgeon: Alejandro Parkinson MD;  Location: [facility] OR;  Service: ENT;  Laterality: Bilateral;    FUNCTIONAL ENDOSCOPIC SINUS SURGERY (FESS) Bilateral 3/27/2019    Procedure: FESS (FUNCTIONAL ENDOSCOPIC SINUS SURGERY);  Surgeon: Alejandro Parkinson MD;  Location: [facility] OR;  Service: ENT;  Laterality: Bilateral;  Left Keila bullosa resection     INTRALUMINAL GASTROINTESTINAL TRACT IMAGING VIA CAPSULE N/A 2/2/2024    Procedure: IMAGING PROCEDURE, GI TRACT, INTRALUMINAL, VIA CAPSULE;  Surgeon: Cooper Estrella RN;  Location: [facility];  Service: Endoscopy;  Laterality: N/A;    KNEE ARTHROSCOPY W/ MENISCAL REPAIR Left 06/2019    dr boykin    KNEE SURGERY Right     LEFT HEART CATHETERIZATION Left 3/2/2020    Procedure: CATHETERIZATION, HEART, LEFT;  Surgeon: Cora Cormier MD;  Location: [facility] CATH LAB;  Service: Cardiology;  Laterality: Left;    LIVER BIOPSY  01/2022    MAXILLARY ANTROSTOMY Bilateral 3/27/2019    Procedure: MAXILLARY ANTROSTOMY;  Surgeon: Alejandro Parkinson MD;  Location: [facility] OR;  Service: ENT;  Laterality: Bilateral;    NASAL SEPTOPLASTY N/A 3/27/2019    Procedure: SEPTOPLASTY, NOSE;  Surgeon: Alejandro Parkinson MD;  Location: [facility] OR;  Service: ENT;  Laterality: N/A;    RECTAL SURGERY  2012    STENT, SUPERIOR MESENTERIC ARTERY Left 1/4/2024    Procedure: STENT, SUPERIOR MESENTERIC ARTERY;  Surgeon: ALEX Alfred III, MD;  Location: [address];  Service: Vascular;  Laterality: Left;     TRANSURETHRAL RESECTION OF PROSTATE (TURP) WITHOUT USE OF LASER N/A 2018    Procedure: TURP, WITHOUT USING LASER;  Surgeon: Cooper Cordova IV, MD;  Location: [facility];  Service: Urology;  Laterality: N/A;    TRIGGER FINGER RELEASE Left        Review of patient's allergies indicates:   Allergen Reactions    Lipitor [atorvastatin] Other (See Comments)     Muscle aches and pains as well as fatigue.     Niacin preparations Other (See Comments)     Causes broken blood vessels and bruises at 4 times normal dose.    Statins-hmg-coa reductase inhibitors Other (See Comments)     Statins cause intolerable myalgias.    Iodinated contrast media Hives     Tachycardia    Omeprazole Hives and Itching    Prilosec [omeprazole magnesium] Hives and Itching     Family History       Problem Relation (Age of Onset)    Heart disease Mother, Father, Brother          Tobacco Use    Smoking status: Former     Current packs/day: 0.00     Average packs/day: 0.5 packs/day for 4.0 years (2.0 ttl pk-yrs)     Types: Cigarettes     Start date: 1968     Quit date: 1972     Years since quittin.9    Smokeless tobacco: Former     Types: Chew     Quit date: 1976   Substance and Sexual Activity    Alcohol use: Yes     Alcohol/week: 2.0 standard drinks of alcohol     Types: 2 Cans of beer per week    Drug use: No    Sexual activity: Yes     Partners: Female     Review of Systems   Gastrointestinal:  Positive for blood in stool. Negative for constipation, diarrhea and nausea.     Objective:     Vital Signs (Most Recent):  Temp: 98.2 °F (36.8 °C) (24 1557)  Pulse: 80 (24 1557)  Resp: 18 (24 1557)  BP: (!) 149/67 (24 1557)  SpO2: 97 % (24 1557) Vital Signs (24h Range):  Temp:  [98.1 °F (36.7 °C)-98.2 °F (36.8 °C)] 98.2 °F (36.8 °C)  Pulse:  [70-80] 80  Resp:  [14-18] 18  SpO2:  [97 %-100 %] 97 %  BP: (115-149)/(58-67) 149/67     Weight: 98 kg (216 lb 0.8 oz) (24 1138)  Body mass index is 33.84  "kg/m².    No intake or output data in the 24 hours ending 04/28/24 1702    Lines/Drains/Airways       Peripheral Intravenous Line  Duration                  Peripheral IV - Single Lumen 04/28/24 1222 20 G Right Antecubital <1 day                     Physical Exam  Constitutional:       Appearance: Normal appearance.   HENT:      Head: Normocephalic.   Eyes:      Conjunctiva/sclera: Conjunctivae normal.   Pulmonary:      Effort: Pulmonary effort is normal.   Abdominal:      General: There is no distension.      Tenderness: There is no abdominal tenderness.   Genitourinary:     Comments: EDGAR with no stool in rectal vault  Neurological:      Mental Status: He is alert.          Significant Labs:  CBC:   Recent Labs   Lab 04/28/24  1204 04/28/24  1613   WBC 3.41* 3.45*   HGB 9.4* 9.6*   HCT 29.0* 30.5*    168     CMP:   Recent Labs   Lab 04/28/24  1204   *   CALCIUM 9.0   ALBUMIN 3.5   PROT 6.7      K 4.0   CO2 24      BUN 21   CREATININE 1.2   ALKPHOS 69   ALT 11   AST 17   BILITOT 0.3     Coagulation: No results for input(s): "PT", "INR", "APTT" in the last 48 hours.    Significant Imaging:  Imaging results within the past 24 hours have been reviewed.  "

## 2024-04-28 NOTE — ASSESSMENT & PLAN NOTE
Patient with known CAD s/p CABG, which is controlled Will continue ASA and Statin and monitor for S/Sx of angina/ACS. Continue to monitor on telemetry.   - holding asa d/t possible UGI bleed

## 2024-04-29 LAB
ANION GAP SERPL CALC-SCNC: 8 MMOL/L (ref 8–16)
BASOPHILS # BLD AUTO: 0.01 K/UL (ref 0–0.2)
BASOPHILS NFR BLD: 0.3 % (ref 0–1.9)
BUN SERPL-MCNC: 13 MG/DL (ref 8–23)
CALCIUM SERPL-MCNC: 8.4 MG/DL (ref 8.7–10.5)
CHLORIDE SERPL-SCNC: 108 MMOL/L (ref 95–110)
CO2 SERPL-SCNC: 22 MMOL/L (ref 23–29)
CREAT SERPL-MCNC: 0.9 MG/DL (ref 0.5–1.4)
DIFFERENTIAL METHOD BLD: ABNORMAL
EOSINOPHIL # BLD AUTO: 0.1 K/UL (ref 0–0.5)
EOSINOPHIL NFR BLD: 2 % (ref 0–8)
EOSINOPHIL NFR BLD: 2.7 % (ref 0–8)
EOSINOPHIL NFR BLD: 2.8 % (ref 0–8)
ERYTHROCYTE [DISTWIDTH] IN BLOOD BY AUTOMATED COUNT: 24.4 % (ref 11.5–14.5)
ERYTHROCYTE [DISTWIDTH] IN BLOOD BY AUTOMATED COUNT: 24.6 % (ref 11.5–14.5)
ERYTHROCYTE [DISTWIDTH] IN BLOOD BY AUTOMATED COUNT: 24.7 % (ref 11.5–14.5)
EST. GFR  (NO RACE VARIABLE): >60 ML/MIN/1.73 M^2
GLUCOSE SERPL-MCNC: 97 MG/DL (ref 70–110)
HCT VFR BLD AUTO: 27.3 % (ref 40–54)
HCT VFR BLD AUTO: 27.7 % (ref 40–54)
HCT VFR BLD AUTO: 28.7 % (ref 40–54)
HGB BLD-MCNC: 8.8 G/DL (ref 14–18)
HGB BLD-MCNC: 9 G/DL (ref 14–18)
HGB BLD-MCNC: 9.1 G/DL (ref 14–18)
IMM GRANULOCYTES # BLD AUTO: 0.01 K/UL (ref 0–0.04)
IMM GRANULOCYTES # BLD AUTO: 0.01 K/UL (ref 0–0.04)
IMM GRANULOCYTES # BLD AUTO: 0.02 K/UL (ref 0–0.04)
IMM GRANULOCYTES NFR BLD AUTO: 0.3 % (ref 0–0.5)
IMM GRANULOCYTES NFR BLD AUTO: 0.3 % (ref 0–0.5)
IMM GRANULOCYTES NFR BLD AUTO: 0.7 % (ref 0–0.5)
LYMPHOCYTES # BLD AUTO: 0.8 K/UL (ref 1–4.8)
LYMPHOCYTES # BLD AUTO: 1 K/UL (ref 1–4.8)
LYMPHOCYTES # BLD AUTO: 1 K/UL (ref 1–4.8)
LYMPHOCYTES NFR BLD: 27.5 % (ref 18–48)
LYMPHOCYTES NFR BLD: 32.4 % (ref 18–48)
LYMPHOCYTES NFR BLD: 33.4 % (ref 18–48)
MCH RBC QN AUTO: 27.7 PG (ref 27–31)
MCH RBC QN AUTO: 27.9 PG (ref 27–31)
MCH RBC QN AUTO: 28.2 PG (ref 27–31)
MCHC RBC AUTO-ENTMCNC: 31.7 G/DL (ref 32–36)
MCHC RBC AUTO-ENTMCNC: 32.2 G/DL (ref 32–36)
MCHC RBC AUTO-ENTMCNC: 32.5 G/DL (ref 32–36)
MCV RBC AUTO: 87 FL (ref 82–98)
MONOCYTES # BLD AUTO: 0.4 K/UL (ref 0.3–1)
MONOCYTES NFR BLD: 12.2 % (ref 4–15)
MONOCYTES NFR BLD: 14 % (ref 4–15)
MONOCYTES NFR BLD: 14.4 % (ref 4–15)
NEUTROPHILS # BLD AUTO: 1.5 K/UL (ref 1.8–7.7)
NEUTROPHILS # BLD AUTO: 1.5 K/UL (ref 1.8–7.7)
NEUTROPHILS # BLD AUTO: 1.6 K/UL (ref 1.8–7.7)
NEUTROPHILS NFR BLD: 48.5 % (ref 38–73)
NEUTROPHILS NFR BLD: 51 % (ref 38–73)
NEUTROPHILS NFR BLD: 56.9 % (ref 38–73)
NRBC BLD-RTO: 0 /100 WBC
PLATELET # BLD AUTO: 154 K/UL (ref 150–450)
PLATELET # BLD AUTO: 163 K/UL (ref 150–450)
PLATELET # BLD AUTO: 168 K/UL (ref 150–450)
PMV BLD AUTO: 9.3 FL (ref 9.2–12.9)
PMV BLD AUTO: 9.3 FL (ref 9.2–12.9)
PMV BLD AUTO: 9.4 FL (ref 9.2–12.9)
POCT GLUCOSE: 101 MG/DL (ref 70–110)
POCT GLUCOSE: 105 MG/DL (ref 70–110)
POCT GLUCOSE: 107 MG/DL (ref 70–110)
POCT GLUCOSE: 90 MG/DL (ref 70–110)
POTASSIUM SERPL-SCNC: 3.7 MMOL/L (ref 3.5–5.1)
RBC # BLD AUTO: 3.15 M/UL (ref 4.6–6.2)
RBC # BLD AUTO: 3.19 M/UL (ref 4.6–6.2)
RBC # BLD AUTO: 3.29 M/UL (ref 4.6–6.2)
SODIUM SERPL-SCNC: 138 MMOL/L (ref 136–145)
WBC # BLD AUTO: 2.87 K/UL (ref 3.9–12.7)
WBC # BLD AUTO: 2.99 K/UL (ref 3.9–12.7)
WBC # BLD AUTO: 2.99 K/UL (ref 3.9–12.7)

## 2024-04-29 PROCEDURE — 99900035 HC TECH TIME PER 15 MIN (STAT)

## 2024-04-29 PROCEDURE — 63600175 PHARM REV CODE 636 W HCPCS: Performed by: NURSE PRACTITIONER

## 2024-04-29 PROCEDURE — C9113 INJ PANTOPRAZOLE SODIUM, VIA: HCPCS | Performed by: NURSE PRACTITIONER

## 2024-04-29 PROCEDURE — 96376 TX/PRO/DX INJ SAME DRUG ADON: CPT

## 2024-04-29 PROCEDURE — 85025 COMPLETE CBC W/AUTO DIFF WBC: CPT | Mod: 91 | Performed by: NURSE PRACTITIONER

## 2024-04-29 PROCEDURE — 80048 BASIC METABOLIC PNL TOTAL CA: CPT | Performed by: NURSE PRACTITIONER

## 2024-04-29 PROCEDURE — 99214 OFFICE O/P EST MOD 30 MIN: CPT | Mod: ,,, | Performed by: PHYSICIAN ASSISTANT

## 2024-04-29 PROCEDURE — 36415 COLL VENOUS BLD VENIPUNCTURE: CPT | Mod: XB | Performed by: NURSE PRACTITIONER

## 2024-04-29 PROCEDURE — 25000003 PHARM REV CODE 250: Performed by: NURSE PRACTITIONER

## 2024-04-29 PROCEDURE — G0378 HOSPITAL OBSERVATION PER HR: HCPCS

## 2024-04-29 RX ADMIN — PANTOPRAZOLE SODIUM 40 MG: 40 INJECTION, POWDER, FOR SOLUTION INTRAVENOUS at 08:04

## 2024-04-29 RX ADMIN — FINASTERIDE 5 MG: 5 TABLET, FILM COATED ORAL at 09:04

## 2024-04-29 RX ADMIN — PROPAFENONE HYDROCHLORIDE 150 MG: 150 TABLET, FILM COATED ORAL at 09:04

## 2024-04-29 RX ADMIN — PANTOPRAZOLE SODIUM 40 MG: 40 INJECTION, POWDER, FOR SOLUTION INTRAVENOUS at 10:04

## 2024-04-29 RX ADMIN — LEVOTHYROXINE SODIUM 300 MCG: 150 TABLET ORAL at 05:04

## 2024-04-29 RX ADMIN — RANOLAZINE 1000 MG: 500 TABLET, EXTENDED RELEASE ORAL at 08:04

## 2024-04-29 RX ADMIN — PROPAFENONE HYDROCHLORIDE 150 MG: 150 TABLET, FILM COATED ORAL at 05:04

## 2024-04-29 RX ADMIN — CARVEDILOL 12.5 MG: 12.5 TABLET, FILM COATED ORAL at 08:04

## 2024-04-29 RX ADMIN — PROPAFENONE HYDROCHLORIDE 150 MG: 150 TABLET, FILM COATED ORAL at 03:04

## 2024-04-29 RX ADMIN — RANOLAZINE 1000 MG: 500 TABLET, EXTENDED RELEASE ORAL at 09:04

## 2024-04-29 RX ADMIN — CARVEDILOL 12.5 MG: 12.5 TABLET, FILM COATED ORAL at 04:04

## 2024-04-29 NOTE — SUBJECTIVE & OBJECTIVE
Subjective:     Interval History: The patient is feeling ok. No nausea, vomiting, abdominal pain or stools. Hgb down a little overnight.     Review of Systems   Constitutional:         See Interval History for daily ROS.      Objective:     Vital Signs (Most Recent):  Temp: 98.4 °F (36.9 °C) (04/29/24 0719)  Pulse: 73 (04/29/24 0719)  Resp: 16 (04/29/24 0719)  BP: 113/67 (04/29/24 0719)  SpO2: 97 % (04/29/24 0719) Vital Signs (24h Range):  Temp:  [97.6 °F (36.4 °C)-98.4 °F (36.9 °C)] 98.4 °F (36.9 °C)  Pulse:  [65-80] 73  Resp:  [14-18] 16  SpO2:  [97 %-100 %] 97 %  BP: (111-149)/(54-67) 113/67     Weight: 98 kg (216 lb 0.8 oz) (04/28/24 1138)  Body mass index is 33.84 kg/m².      Intake/Output Summary (Last 24 hours) at 4/29/2024 1021  Last data filed at 4/29/2024 0413  Gross per 24 hour   Intake 240 ml   Output 2 ml   Net 238 ml       Lines/Drains/Airways       Peripheral Intravenous Line  Duration                  Peripheral IV - Single Lumen 04/28/24 1222 20 G Right Antecubital <1 day                     Physical Exam  Constitutional:       General: He is not in acute distress.     Appearance: Normal appearance. He is well-developed.   HENT:      Head: Normocephalic and atraumatic.   Eyes:      Extraocular Movements: Extraocular movements intact.   Cardiovascular:      Rate and Rhythm: Normal rate and regular rhythm.      Heart sounds: Murmur heard.   Pulmonary:      Effort: Pulmonary effort is normal. No respiratory distress.      Breath sounds: Normal breath sounds. No wheezing.   Abdominal:      General: Bowel sounds are normal. There is no distension.      Palpations: Abdomen is soft.      Tenderness: There is no abdominal tenderness.   Neurological:      Mental Status: He is alert and oriented to person, place, and time.      Cranial Nerves: No cranial nerve deficit.   Psychiatric:         Behavior: Behavior normal.          Significant Labs:  CBC:   Recent Labs   Lab 04/28/24  1613 04/29/24  0009  "04/29/24  0642   WBC 3.45* 2.99* 2.87*   HGB 9.6* 9.0* 8.8*   HCT 30.5* 27.7* 27.3*    154 163     CMP:   Recent Labs   Lab 04/28/24  1204 04/29/24  0642   * 97   CALCIUM 9.0 8.4*   ALBUMIN 3.5  --    PROT 6.7  --     138   K 4.0 3.7   CO2 24 22*    108   BUN 21 13   CREATININE 1.2 0.9   ALKPHOS 69  --    ALT 11  --    AST 17  --    BILITOT 0.3  --      Coagulation: No results for input(s): "PT", "INR", "APTT" in the last 48 hours.      Significant Imaging:  Imaging results within the past 24 hours have been reviewed.  "

## 2024-04-29 NOTE — ASSESSMENT & PLAN NOTE
Patient with Paroxysmal (<7 days) atrial fibrillation which is controlled currently with Beta Blocker and Rythmol . Patient is currently in sinus rhythm.[id number] Score: 4. Anticoagulation indicated. Anticoagulation done with xarelto .    - follows with Dr. Gallardo and Dr. Dede Enriquez, per chart review planning on ablation on 6/24  - holding home xarelto d/t possible GI bleed

## 2024-04-29 NOTE — ASSESSMENT & PLAN NOTE
- hx of esophageal varices; EGD (01/17/24): Grade I esophageal varices without bleeding and gastritis.   - hgb on 4/9 11, today 9.4  - Stool OCB +   - GI consulted  - PPI IV BID and NPO 0000 for possible scope  - trend hgb     4/29/24  No further episodes  Hemoglobin levels are stable   GI will plan for EGD if patient has recurrent episode or H/H drops

## 2024-04-29 NOTE — PLAN OF CARE
[facility] Med Surg  Initial Discharge Assessment       Primary Care Provider: Bhupinder Callaway MD    Admission Diagnosis: A-fib [I48.91]  UGI bleed [K92.2]  Chest pain [R07.9]    Admission Date: 4/28/2024  Expected Discharge Date: Per Attending     Transition of Care Barriers: None    Payor: [facility] MCARE / Plan: [employer] [facility] MEDICARE ADVANTAGE / Product Type: Medicare Advantage /     Extended Emergency Contact Information  Primary Emergency Contact: Henrry Pretty  Mobile Phone: [phone]  Relation: Son  Preferred language: English   needed? No    Discharge Plan A: Home         [facility] - [place], [place] - [address] Counts include 234 beds at the [facility] [address]  [address]  [facility] [address]  Phone: [phone] Fax: [phone]      Initial Assessment (most recent)       Adult Discharge Assessment - 04/29/24 1122          Discharge Assessment    Assessment Type Discharge Planning Assessment     Confirmed/corrected address, phone number and insurance Yes     Confirmed Demographics Correct on Facesheet     Source of Information patient     Communicated KOURTNEY with patient/caregiver Date not available/Unable to determine     Reason For Admission melena     People in Home alone     Do you expect to return to your current living situation? Yes     Do you have help at home or someone to help you manage your care at home? No     Prior to hospitilization cognitive status: Alert/Oriented     Current cognitive status: Alert/Oriented     Walking or Climbing Stairs Difficulty no     Dressing/Bathing Difficulty no     Home Layout Able to live on 1st floor     Equipment Currently Used at Home none     Readmission within 30 days? No     Patient currently being followed by outpatient case management? No     Do you currently have service(s) that help you manage your care at home? No     Do you take prescription medications? Yes     Do you have prescription coverage? Yes     Coverage [facility] Medicare     Do you have  any problems affording any of your prescribed medications? No     Is the patient taking medications as prescribed? yes     Who is going to help you get home at discharge? family     How do you get to doctors appointments? car, drives self     Are you on dialysis? No     Do you take coumadin? No     Discharge Plan A Home     DME Needed Upon Discharge  none     Discharge Plan discussed with: Patient     Transition of Care Barriers None                   Sw met with patient to complete assessment and to discuss d/c planning needs. Patient has no d/c needs at this time. Sw to follow up, as needed, for d/c planning purposes.

## 2024-04-29 NOTE — PROGRESS NOTES
"[facility]  Progress Note    Patient Name: Erendira Pretty  MRN: [id number]  Patient Class: OP- Observation   Admission Date: 4/28/2024  Length of Stay: 0 days  Attending Physician: Raisa Galaviz MD  Primary Care Provider: Bhupinder Callaway MD    Subjective:     Principal Problem:Melena        HPI:  Mr. Pretty is a 78 year old male with a history of CAD s/p CABG, hepatocellular carcinoma, esophageal varices, DM, HTN, GERD, PAF (on xarelto), SMA stenting 01/05/2024,  and blaise on cpap who presents to the ED for evaluation of two episodes of blood in stool described as "black and oily" started today. Pt is on xarelto (last dose 4/27) and baby aspirin (last dose 4/28) but was taken off Plavix.  Patient denies any BRBPR, abdominal pain, and hematemesis.  Lab work in the ED notable for hgb 9.4, stool OCB +.  Case discussed with GI and ED, recommended PPI IV BID and NPO 0000 for scope.  Patient will be admitted to observation for UGI bleed.    Of note, EGD (01/17/24): Grade I esophageal varices without bleeding and gastritis.     Code Status Full  Surrogate Decision Maker Henrry willis     Overview/Hospital Course:  Erendira Pretty is a 78 year old male who presented to ED for melena. H/H with mild decline. He was seen by GI who will consider EGD if patient has another episode of melena or H/H declines further. Will continue clear liquid diet today.    Interval History: denies melena today. Tolerating clear liquid diet    Review of Systems   Constitutional:  Negative for chills, diaphoresis, fatigue and fever.   HENT:  Negative for drooling, ear pain, rhinorrhea and sore throat.    Eyes: Negative.    Respiratory:  Negative for cough, shortness of breath and wheezing.    Cardiovascular:  Negative for palpitations and leg swelling.   Gastrointestinal:  Negative for abdominal pain, constipation, diarrhea and nausea.        Melena     Endocrine: Negative.    Genitourinary:  Negative for dysuria, hematuria " and urgency.   Musculoskeletal: Negative.    Skin:  Negative for color change and wound.   Allergic/Immunologic: Negative.    Neurological:  Negative for dizziness, syncope and speech difficulty.   Hematological: Negative.    Psychiatric/Behavioral: Negative.       Objective:     Vital Signs (Most Recent):  Temp: 98.1 °F (36.7 °C) (04/29/24 1551)  Pulse: 69 (04/29/24 1551)  Resp: 20 (04/29/24 1551)  BP: (!) 150/71 (04/29/24 1551)  SpO2: 98 % (04/29/24 1551) Vital Signs (24h Range):  Temp:  [97.6 °F (36.4 °C)-98.5 °F (36.9 °C)] 98.1 °F (36.7 °C)  Pulse:  [62-80] 69  Resp:  [16-20] 20  SpO2:  [97 %-100 %] 98 %  BP: (111-150)/(54-71) 150/71     Weight: 98 kg (216 lb 0.8 oz)  Body mass index is 33.84 kg/m².    Intake/Output Summary (Last 24 hours) at 4/29/2024 1554  Last data filed at 4/29/2024 0413  Gross per 24 hour   Intake 240 ml   Output 2 ml   Net 238 ml         Physical Exam  Constitutional:       General: He is not in acute distress.     Appearance: Normal appearance. He is well-developed.   HENT:      Head: Normocephalic and atraumatic.   Eyes:      Extraocular Movements: Extraocular movements intact.   Cardiovascular:      Rate and Rhythm: Normal rate and regular rhythm.      Heart sounds: Murmur heard.   Pulmonary:      Effort: Pulmonary effort is normal. No respiratory distress.      Breath sounds: Normal breath sounds. No wheezing.   Abdominal:      General: Bowel sounds are normal. There is no distension.      Palpations: Abdomen is soft.      Tenderness: There is no abdominal tenderness.   Neurological:      Mental Status: He is alert and oriented to person, place, and time.      Cranial Nerves: No cranial nerve deficit.   Psychiatric:         Behavior: Behavior normal.           Significant Labs: All pertinent labs within the past 24 hours have been reviewed.  CBC:   Recent Labs   Lab 04/28/24  1613 04/29/24  0009 04/29/24  0642   WBC 3.45* 2.99* 2.87*   HGB 9.6* 9.0* 8.8*   HCT 30.5* 27.7* 27.3*   PLT  168 154 163     CMP:   Recent Labs   Lab 04/28/24  1204 04/29/24  0642    138   K 4.0 3.7    108   CO2 24 22*   * 97   BUN 21 13   CREATININE 1.2 0.9   CALCIUM 9.0 8.4*   PROT 6.7  --    ALBUMIN 3.5  --    BILITOT 0.3  --    ALKPHOS 69  --    AST 17  --    ALT 11  --    ANIONGAP 7* 8       Significant Imaging: I have reviewed all pertinent imaging results/findings within the past 24 hours.    Assessment/Plan:      * Melena  - hx of esophageal varices; EGD (01/17/24): Grade I esophageal varices without bleeding and gastritis.   - hgb on 4/9 11, today 9.4  - Stool OCB +   - GI consulted  - PPI IV BID and NPO 0000 for possible scope  - trend hgb     4/29/24  No further episodes  Hemoglobin levels are stable   GI will plan for EGD if patient has recurrent episode or H/H drops      Paroxysmal atrial fibrillation  Patient with Paroxysmal (<7 days) atrial fibrillation which is controlled currently with Beta Blocker and Rythmol . Patient is currently in sinus rhythm.[id number] Score: 4. Anticoagulation indicated. Anticoagulation done with xarelto .    - follows with Dr. Gallardo and Dr. Dede Enriquez, per chart review planning on ablation on 6/24  - holding home xarelto d/t possible GI bleed    Type 2 diabetes mellitus with microalbuminuria, with long-term current use of insulin  - SSI and BG monitoring   - holding jardiance and metformin         DELONTE on CPAP  - continue cpap nightly         Benign prostatic hyperplasia with nocturia  - continue home finasteride       Coronary artery disease involving native coronary artery of native heart without angina pectoris  Patient with known CAD s/p CABG, which is controlled Will continue ASA and Statin and monitor for S/Sx of angina/ACS. Continue to monitor on telemetry.   - holding asa d/t possible UGI bleed      Essential hypertension  - continue home bb, holding home lasix and lisinopril  - trend bp and adjust meds as necessary       VTE Risk Mitigation (From  admission, onward)           Ordered     Reason for No Pharmacological VTE Prophylaxis  Once        Question:  Reasons:  Answer:  Physician Provided (leave comment)  Comment:  UGI bleed    04/28/24 1411     IP VTE HIGH RISK PATIENT  Once         04/28/24 1411     Place sequential compression device  Until discontinued         04/28/24 1411                    Discharge Planning   KOURTNEY:      Code Status: Full Code   Is the patient medically ready for discharge?:     Reason for patient still in hospital (select all that apply): Treatment  Discharge Plan A: Home          Tammi Cruz NP  Department of Hospital Medicine   [facility]

## 2024-04-29 NOTE — ASSESSMENT & PLAN NOTE
Patient reported one black stool at admit.   EDGAR reportedly negative for melena.   Last dose of Xarelto was 04/27/24 PM. Continue to hold.   Clear liquid diet for now.   We will monitor him for more episodes of melena  Continue PPI IV BID

## 2024-04-29 NOTE — SUBJECTIVE & OBJECTIVE
Interval History: denies melena today. Tolerating clear liquid diet    Review of Systems   Constitutional:  Negative for chills, diaphoresis, fatigue and fever.   HENT:  Negative for drooling, ear pain, rhinorrhea and sore throat.    Eyes: Negative.    Respiratory:  Negative for cough, shortness of breath and wheezing.    Cardiovascular:  Negative for palpitations and leg swelling.   Gastrointestinal:  Negative for abdominal pain, constipation, diarrhea and nausea.        Melena     Endocrine: Negative.    Genitourinary:  Negative for dysuria, hematuria and urgency.   Musculoskeletal: Negative.    Skin:  Negative for color change and wound.   Allergic/Immunologic: Negative.    Neurological:  Negative for dizziness, syncope and speech difficulty.   Hematological: Negative.    Psychiatric/Behavioral: Negative.       Objective:     Vital Signs (Most Recent):  Temp: 98.1 °F (36.7 °C) (04/29/24 1551)  Pulse: 69 (04/29/24 1551)  Resp: 20 (04/29/24 1551)  BP: (!) 150/71 (04/29/24 1551)  SpO2: 98 % (04/29/24 1551) Vital Signs (24h Range):  Temp:  [97.6 °F (36.4 °C)-98.5 °F (36.9 °C)] 98.1 °F (36.7 °C)  Pulse:  [62-80] 69  Resp:  [16-20] 20  SpO2:  [97 %-100 %] 98 %  BP: (111-150)/(54-71) 150/71     Weight: 98 kg (216 lb 0.8 oz)  Body mass index is 33.84 kg/m².    Intake/Output Summary (Last 24 hours) at 4/29/2024 1554  Last data filed at 4/29/2024 0413  Gross per 24 hour   Intake 240 ml   Output 2 ml   Net 238 ml         Physical Exam  Constitutional:       General: He is not in acute distress.     Appearance: Normal appearance. He is well-developed.   HENT:      Head: Normocephalic and atraumatic.   Eyes:      Extraocular Movements: Extraocular movements intact.   Cardiovascular:      Rate and Rhythm: Normal rate and regular rhythm.      Heart sounds: Murmur heard.   Pulmonary:      Effort: Pulmonary effort is normal. No respiratory distress.      Breath sounds: Normal breath sounds. No wheezing.   Abdominal:      General:  Bowel sounds are normal. There is no distension.      Palpations: Abdomen is soft.      Tenderness: There is no abdominal tenderness.   Neurological:      Mental Status: He is alert and oriented to person, place, and time.      Cranial Nerves: No cranial nerve deficit.   Psychiatric:         Behavior: Behavior normal.           Significant Labs: All pertinent labs within the past 24 hours have been reviewed.  CBC:   Recent Labs   Lab 04/28/24  1613 04/29/24  0009 04/29/24  0642   WBC 3.45* 2.99* 2.87*   HGB 9.6* 9.0* 8.8*   HCT 30.5* 27.7* 27.3*    154 163     CMP:   Recent Labs   Lab 04/28/24  1204 04/29/24  0642    138   K 4.0 3.7    108   CO2 24 22*   * 97   BUN 21 13   CREATININE 1.2 0.9   CALCIUM 9.0 8.4*   PROT 6.7  --    ALBUMIN 3.5  --    BILITOT 0.3  --    ALKPHOS 69  --    AST 17  --    ALT 11  --    ANIONGAP 7* 8       Significant Imaging: I have reviewed all pertinent imaging results/findings within the past 24 hours.

## 2024-04-29 NOTE — PLAN OF CARE
Purposeful round q2hr  Call light within reach  Bed in lowest position   Meds given per doc order  Free from falls\  Chart check complete

## 2024-04-29 NOTE — PLAN OF CARE
Purposeful rounding every 2 hours  VS WDL  Patient ambulated frequently this shift  Cardiac monitoring in use, tele monitor # [id number]  Blood glucose monitoring   Fall precautions in place, remains injury free  Bed locked at lowest position  Call light within reach

## 2024-04-29 NOTE — HOSPITAL COURSE
Erendira Pretty is a 78 year old male who presented to ED for melena. H/H with mild decline. He was seen by GI who will consider EGD if patient has another episode of melena or H/H declines further. Will continue clear liquid diet today. He was placed NPO and underwent upper GI which demonstrated gastritis.  He is medically stable for DC from GI standpoint. He is instructed to conitnue PPI at 40 mg bid and follow up with cardiology regarding oral anticoagulation and recurrent melena.

## 2024-04-29 NOTE — PROGRESS NOTES
[facility]  Gastroenterology  Progress Note    Patient Name: Erendira Pretty  MRN: [id number]  Admission Date: 4/28/2024  Hospital Length of Stay: 0 days  Code Status: Full Code   Attending Provider: Raisa Galaviz MD  Consulting Provider: Phi Cordero PA-C  Primary Care Physician: Bhupinder Callaway MD  Principal Problem: Melena        Subjective:     Interval History: The patient is feeling ok. No nausea, vomiting, abdominal pain or stools. Hgb down a little overnight.     Review of Systems   Constitutional:         See Interval History for daily ROS.      Objective:     Vital Signs (Most Recent):  Temp: 98.4 °F (36.9 °C) (04/29/24 0719)  Pulse: 73 (04/29/24 0719)  Resp: 16 (04/29/24 0719)  BP: 113/67 (04/29/24 0719)  SpO2: 97 % (04/29/24 0719) Vital Signs (24h Range):  Temp:  [97.6 °F (36.4 °C)-98.4 °F (36.9 °C)] 98.4 °F (36.9 °C)  Pulse:  [65-80] 73  Resp:  [14-18] 16  SpO2:  [97 %-100 %] 97 %  BP: (111-149)/(54-67) 113/67     Weight: 98 kg (216 lb 0.8 oz) (04/28/24 1138)  Body mass index is 33.84 kg/m².      Intake/Output Summary (Last 24 hours) at 4/29/2024 1021  Last data filed at 4/29/2024 0413  Gross per 24 hour   Intake 240 ml   Output 2 ml   Net 238 ml       Lines/Drains/Airways       Peripheral Intravenous Line  Duration                  Peripheral IV - Single Lumen 04/28/24 1222 20 G Right Antecubital <1 day                     Physical Exam  Constitutional:       General: He is not in acute distress.     Appearance: Normal appearance. He is well-developed.   HENT:      Head: Normocephalic and atraumatic.   Eyes:      Extraocular Movements: Extraocular movements intact.   Cardiovascular:      Rate and Rhythm: Normal rate and regular rhythm.      Heart sounds: Murmur heard.   Pulmonary:      Effort: Pulmonary effort is normal. No respiratory distress.      Breath sounds: Normal breath sounds. No wheezing.   Abdominal:      General: Bowel sounds are normal. There is no distension.       "Palpations: Abdomen is soft.      Tenderness: There is no abdominal tenderness.   Neurological:      Mental Status: He is alert and oriented to person, place, and time.      Cranial Nerves: No cranial nerve deficit.   Psychiatric:         Behavior: Behavior normal.          Significant Labs:  CBC:   Recent Labs   Lab 04/28/24  1613 04/29/24  0009 04/29/24  0642   WBC 3.45* 2.99* 2.87*   HGB 9.6* 9.0* 8.8*   HCT 30.5* 27.7* 27.3*    154 163     CMP:   Recent Labs   Lab 04/28/24  1204 04/29/24  0642   * 97   CALCIUM 9.0 8.4*   ALBUMIN 3.5  --    PROT 6.7  --     138   K 4.0 3.7   CO2 24 22*    108   BUN 21 13   CREATININE 1.2 0.9   ALKPHOS 69  --    ALT 11  --    AST 17  --    BILITOT 0.3  --      Coagulation: No results for input(s): "PT", "INR", "APTT" in the last 48 hours.      Significant Imaging:  Imaging results within the past 24 hours have been reviewed.  Assessment/Plan:     GI  * Melena  Patient reported one black stool at admit.   EDGAR reportedly negative for melena.   Last dose of Xarelto was 04/27/24 PM. Continue to hold.   Clear liquid diet for now.   We will monitor him for more episodes of melena  Continue PPI IV BID       Thank you for your consult. I will follow-up with patient. Please contact us if you have any additional questions.    Phi Cordero PA-C  Gastroenterology  [facility]  "

## 2024-04-30 ENCOUNTER — ANESTHESIA (OUTPATIENT)
Dept: ENDOSCOPY | Facility: HOSPITAL | Age: 79
End: 2024-04-30
Payer: MEDICARE

## 2024-04-30 ENCOUNTER — ANESTHESIA EVENT (OUTPATIENT)
Dept: ENDOSCOPY | Facility: HOSPITAL | Age: 79
End: 2024-04-30
Payer: MEDICARE

## 2024-04-30 LAB
ANION GAP SERPL CALC-SCNC: 5 MMOL/L (ref 8–16)
BASOPHILS # BLD AUTO: 0.02 K/UL (ref 0–0.2)
BASOPHILS NFR BLD: 0.5 % (ref 0–1.9)
BUN SERPL-MCNC: 11 MG/DL (ref 8–23)
CALCIUM SERPL-MCNC: 8.4 MG/DL (ref 8.7–10.5)
CHLORIDE SERPL-SCNC: 108 MMOL/L (ref 95–110)
CO2 SERPL-SCNC: 27 MMOL/L (ref 23–29)
CREAT SERPL-MCNC: 1 MG/DL (ref 0.5–1.4)
DIFFERENTIAL METHOD BLD: ABNORMAL
EOSINOPHIL # BLD AUTO: 0.1 K/UL (ref 0–0.5)
EOSINOPHIL NFR BLD: 2.2 % (ref 0–8)
ERYTHROCYTE [DISTWIDTH] IN BLOOD BY AUTOMATED COUNT: 24 % (ref 11.5–14.5)
EST. GFR  (NO RACE VARIABLE): >60 ML/MIN/1.73 M^2
GLUCOSE SERPL-MCNC: 108 MG/DL (ref 70–110)
HCT VFR BLD AUTO: 30.3 % (ref 40–54)
HGB BLD-MCNC: 9.5 G/DL (ref 14–18)
IMM GRANULOCYTES # BLD AUTO: 0.01 K/UL (ref 0–0.04)
IMM GRANULOCYTES NFR BLD AUTO: 0.3 % (ref 0–0.5)
LYMPHOCYTES # BLD AUTO: 1.1 K/UL (ref 1–4.8)
LYMPHOCYTES NFR BLD: 29.7 % (ref 18–48)
MCH RBC QN AUTO: 27.7 PG (ref 27–31)
MCHC RBC AUTO-ENTMCNC: 31.4 G/DL (ref 32–36)
MCV RBC AUTO: 88 FL (ref 82–98)
MONOCYTES # BLD AUTO: 0.4 K/UL (ref 0.3–1)
MONOCYTES NFR BLD: 11.5 % (ref 4–15)
NEUTROPHILS # BLD AUTO: 2 K/UL (ref 1.8–7.7)
NEUTROPHILS NFR BLD: 55.8 % (ref 38–73)
NRBC BLD-RTO: 0 /100 WBC
PLATELET # BLD AUTO: 187 K/UL (ref 150–450)
PMV BLD AUTO: 9.1 FL (ref 9.2–12.9)
POCT GLUCOSE: 107 MG/DL (ref 70–110)
POTASSIUM SERPL-SCNC: 4 MMOL/L (ref 3.5–5.1)
RBC # BLD AUTO: 3.43 M/UL (ref 4.6–6.2)
SODIUM SERPL-SCNC: 140 MMOL/L (ref 136–145)
WBC # BLD AUTO: 3.64 K/UL (ref 3.9–12.7)

## 2024-04-30 PROCEDURE — 25000003 PHARM REV CODE 250: Performed by: NURSE PRACTITIONER

## 2024-04-30 PROCEDURE — 37000009 HC ANESTHESIA EA ADD 15 MINS: Performed by: INTERNAL MEDICINE

## 2024-04-30 PROCEDURE — 37000008 HC ANESTHESIA 1ST 15 MINUTES: Performed by: INTERNAL MEDICINE

## 2024-04-30 PROCEDURE — 43239 EGD BIOPSY SINGLE/MULTIPLE: CPT | Mod: ,,, | Performed by: INTERNAL MEDICINE

## 2024-04-30 PROCEDURE — 85025 COMPLETE CBC W/AUTO DIFF WBC: CPT | Performed by: NURSE PRACTITIONER

## 2024-04-30 PROCEDURE — 27201012 HC FORCEPS, HOT/COLD, DISP: Performed by: INTERNAL MEDICINE

## 2024-04-30 PROCEDURE — 88305 TISSUE EXAM BY PATHOLOGIST: CPT | Mod: 26,,, | Performed by: PATHOLOGY

## 2024-04-30 PROCEDURE — 88342 IMHCHEM/IMCYTCHM 1ST ANTB: CPT | Mod: 26,,, | Performed by: PATHOLOGY

## 2024-04-30 PROCEDURE — 88305 TISSUE EXAM BY PATHOLOGIST: CPT | Performed by: PATHOLOGY

## 2024-04-30 PROCEDURE — G0378 HOSPITAL OBSERVATION PER HR: HCPCS

## 2024-04-30 PROCEDURE — 80048 BASIC METABOLIC PNL TOTAL CA: CPT | Performed by: NURSE PRACTITIONER

## 2024-04-30 PROCEDURE — 96376 TX/PRO/DX INJ SAME DRUG ADON: CPT | Mod: 59

## 2024-04-30 PROCEDURE — 63600175 PHARM REV CODE 636 W HCPCS

## 2024-04-30 PROCEDURE — 88342 IMHCHEM/IMCYTCHM 1ST ANTB: CPT | Performed by: PATHOLOGY

## 2024-04-30 PROCEDURE — 63600175 PHARM REV CODE 636 W HCPCS: Performed by: NURSE PRACTITIONER

## 2024-04-30 PROCEDURE — 36415 COLL VENOUS BLD VENIPUNCTURE: CPT | Performed by: NURSE PRACTITIONER

## 2024-04-30 PROCEDURE — 25000003 PHARM REV CODE 250

## 2024-04-30 PROCEDURE — C9113 INJ PANTOPRAZOLE SODIUM, VIA: HCPCS | Performed by: NURSE PRACTITIONER

## 2024-04-30 PROCEDURE — 43239 EGD BIOPSY SINGLE/MULTIPLE: CPT | Performed by: INTERNAL MEDICINE

## 2024-04-30 RX ORDER — RABEPRAZOLE SODIUM 20 MG/1
40 TABLET, DELAYED RELEASE ORAL 2 TIMES DAILY
Qty: 120 TABLET | Refills: 1 | Status: SHIPPED | OUTPATIENT
Start: 2024-04-30 | End: 2024-06-29

## 2024-04-30 RX ORDER — PROPOFOL 10 MG/ML
VIAL (ML) INTRAVENOUS
Status: DISCONTINUED | OUTPATIENT
Start: 2024-04-30 | End: 2024-04-30

## 2024-04-30 RX ORDER — LIDOCAINE HYDROCHLORIDE 10 MG/ML
INJECTION, SOLUTION EPIDURAL; INFILTRATION; INTRACAUDAL; PERINEURAL
Status: DISCONTINUED | OUTPATIENT
Start: 2024-04-30 | End: 2024-04-30

## 2024-04-30 RX ORDER — PANTOPRAZOLE SODIUM 40 MG/1
40 TABLET, DELAYED RELEASE ORAL 2 TIMES DAILY
Qty: 60 TABLET | Refills: 0 | Status: SHIPPED | OUTPATIENT
Start: 2024-04-30 | End: 2024-04-30 | Stop reason: HOSPADM

## 2024-04-30 RX ORDER — SODIUM CHLORIDE, SODIUM LACTATE, POTASSIUM CHLORIDE, CALCIUM CHLORIDE 600; 310; 30; 20 MG/100ML; MG/100ML; MG/100ML; MG/100ML
INJECTION, SOLUTION INTRAVENOUS CONTINUOUS PRN
Status: DISCONTINUED | OUTPATIENT
Start: 2024-04-30 | End: 2024-04-30

## 2024-04-30 RX ADMIN — CARVEDILOL 12.5 MG: 12.5 TABLET, FILM COATED ORAL at 08:04

## 2024-04-30 RX ADMIN — RANOLAZINE 1000 MG: 500 TABLET, EXTENDED RELEASE ORAL at 08:04

## 2024-04-30 RX ADMIN — Medication 20 MG: at 09:04

## 2024-04-30 RX ADMIN — PROPAFENONE HYDROCHLORIDE 150 MG: 150 TABLET, FILM COATED ORAL at 06:04

## 2024-04-30 RX ADMIN — LIDOCAINE HYDROCHLORIDE 100 MG: 10 SOLUTION INTRAVENOUS at 09:04

## 2024-04-30 RX ADMIN — LEVOTHYROXINE SODIUM 300 MCG: 150 TABLET ORAL at 05:04

## 2024-04-30 RX ADMIN — SODIUM CHLORIDE, SODIUM LACTATE, POTASSIUM CHLORIDE, AND CALCIUM CHLORIDE: 600; 310; 30; 20 INJECTION, SOLUTION INTRAVENOUS at 09:04

## 2024-04-30 RX ADMIN — PANTOPRAZOLE SODIUM 40 MG: 40 INJECTION, POWDER, FOR SOLUTION INTRAVENOUS at 08:04

## 2024-04-30 RX ADMIN — Medication 50 MG: at 09:04

## 2024-04-30 NOTE — NURSING
Patient on tele monitor, NSR. Patient NPO except with sips with meds since midnight. Patient had no significant bloody stool.

## 2024-04-30 NOTE — TRANSFER OF CARE
"Anesthesia Transfer of Care Note    Patient: Erendira Pretty    Procedure(s) Performed: Procedure(s) (LRB):  EGD (ESOPHAGOGASTRODUODENOSCOPY) (N/A)    Patient location: GI    Anesthesia Type: MAC    Transport from OR: Transported from OR on room air with adequate spontaneous ventilation    Post pain: adequate analgesia    Post assessment: no apparent anesthetic complications    Post vital signs: stable    Level of consciousness: awake    Nausea/Vomiting: no nausea/vomiting    Complications: none    Transfer of care protocol was followed      Last vitals: Visit Vitals  BP (!) 186/95   Pulse 72   Temp 36.9 °C (98.4 °F)   Resp 18   Ht 5' 7" (1.702 m)   Wt 98 kg (216 lb 0.8 oz)   SpO2 99%   BMI 33.84 kg/m²     "

## 2024-04-30 NOTE — PLAN OF CARE
Problem: Adult Inpatient Plan of Care  Goal: Plan of Care Review  Outcome: Progressing  Goal: Patient-Specific Goal (Individualized)  Outcome: Progressing  Goal: Absence of Hospital-Acquired Illness or Injury  Outcome: Progressing  Goal: Optimal Comfort and Wellbeing  Outcome: Progressing  Goal: Readiness for Transition of Care  Outcome: Progressing     Problem: Gastrointestinal Bleeding  Goal: Optimal Coping with Acute Illness  Outcome: Progressing  Goal: Hemostasis  Outcome: Progressing     Problem: Diabetes Comorbidity  Goal: Blood Glucose Level Within Targeted Range  Outcome: Progressing     Problem: Fall Injury Risk  Goal: Absence of Fall and Fall-Related Injury  Outcome: Progressing

## 2024-04-30 NOTE — ANESTHESIA PREPROCEDURE EVALUATION
04/30/2024  Erendira Pretty is a 78 y.o., male.    Patient Active Problem List   Diagnosis    Essential hypertension    BMI 36.0-36.9,adult    PVC (premature ventricular contraction)    Acquired hypothyroidism    Coronary artery disease involving native coronary artery of native heart without angina pectoris    Dryness, eye - Both Eyes    Epiretinal membrane - Both Eyes    PVD (peripheral vascular disease)    ED (erectile dysfunction)    Anorectal fistula    Other cirrhosis of liver    Benign prostatic hyperplasia with nocturia    DELONTE on CPAP    Mild intermittent asthma without complication    Pseudophakia    Colon polyps    Anal fissure    Secondary esophageal varices without bleeding    Asbestos exposure    Chest pain, moderate coronary artery risk    Nonrheumatic aortic valve stenosis    Urethral polyp    Exertional angina    Left main coronary artery disease    Mixed hyperlipidemia    S/P CABG x 2    Pain in both lower extremities    Chronic pansinusitis    Nasal polyposis    Non-seasonal allergic rhinitis due to pollen    Iron deficiency anemia due to chronic blood loss    GERD (gastroesophageal reflux disease)    HCC (hepatocellular carcinoma)    DNR (do not resuscitate)    Open angle with borderline findings and low glaucoma risk in both eyes    Aortic atherosclerosis    Type 2 diabetes mellitus with microalbuminuria, with long-term current use of insulin    Bilateral carotid artery stenosis    Paroxysmal atrial fibrillation    Statin myopathy    Dizziness    Lower abdominal pain    Mesenteric artery stenosis    Occlusion of superior mesenteric artery    Melena    Anticoagulants causing adverse effect in therapeutic use    Class 2 severe obesity due to excess calories with serious comorbidity and body mass index (BMI) of 37.0 to 37.9 in adult    Chest pain    Left leg numbness    Episode of transient  neurologic symptoms    Anemia     Past Medical History:   Diagnosis Date    Anticoagulant long-term use     Aortic stenosis     Asthma     Benign prostatic hyperplasia with nocturia     Bilateral carotid artery stenosis 07/21/2021    US CAROTID BILATERAL 07/14/2021 IMPRESSION: Atherosclerosis with suspected stenosis greater than 50% at the right proximal ICA visually. Velocities are discordant and appear improved from prior. Atherosclerosis on the left with no evidence of flow-limiting stenosis greater than 50%.  FINDINGS: Right: Internal Carotid Artery (ICA): Peak systolic velocity 118 cm/sec. 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 09/22/2020    DELONTE on CPAP     Osteoarthritis     Paroxysmal atrial fibrillation     PVD (peripheral vascular disease)     Secondary esophageal varices without bleeding 06/26/2017    Type 2 diabetes mellitus      Past Surgical History:   Procedure Laterality Date    ANGIOGRAM, EXTREMITY, UNILATERAL Left 1/4/2024    Procedure: ANGIOGRAM, EXTREMITY, UNILATERAL- Femoral / SMS Stent, Poss General;  Surgeon: ALEX Alfred III, MD;  Location: [facility] OR [address] [place] [address];  Service: Vascular;  Laterality: Left;  mGy : 2698.78  Gy.cm : 229.88  Contrast : 62ml  Fluro time : 13.8min    CARDIAC CATHETERIZATION      CARDIAC SURGERY      CARPAL TUNNEL RELEASE Left     CATARACT EXTRACTION      CATARACT EXTRACTION W/ INTRAOCULAR LENS  IMPLANT, BILATERAL  2008    CATHETERIZATION OF BOTH LEFT AND RIGHT HEART N/A 11/2/2018     Procedure: CATHETERIZATION, HEART, BOTH LEFT AND RIGHT;  Surgeon: Frank Gallardo MD;  Location: [facility] CATH LAB;  Service: Cardiology;  Laterality: N/A;    COLONOSCOPY  8/2013    COLONOSCOPY N/A 5/30/2016    Procedure: COLONOSCOPY;  Surgeon: Anna Tomas MD;  Location: [facility] ENDO;  Service: Endoscopy;  Laterality: N/A;    COLONOSCOPY N/A 7/17/2019    Procedure: COLONOSCOPY;  Surgeon: David Carter III, MD;  Location: [facility] ENDO;  Service: Endoscopy;  Laterality: N/A;    COLONOSCOPY N/A 12/14/2023    Procedure: COLONOSCOPY;  Surgeon: Ama Barrera MD;  Location: [facility] ENDO;  Service: Endoscopy;  Laterality: N/A;    COMPUTED TOMOGRAPHY N/A 9/9/2019    Procedure: CT (COMPUTED TOMOGRAPHY);  Surgeon: Sri Diagnostic Provider;  Location: [facility] OR;  Service: General;  Laterality: N/A;  Microwave ablation to be done in CT.  Need CRNA and cart    COMPUTED TOMOGRAPHY N/A 1/25/2022    Procedure: Liver Microwave Ablation;  Surgeon: Red Puentes MD;  Location: [facility];  Service: General;  Laterality: N/A;    CORONARY ARTERY BYPASS GRAFT (CABG) N/A 11/5/2018    Procedure: CORONARY ARTERY BYPASS GRAFT (CABG);  Surgeon: Bear De Luna MD;  Location: [facility];  Service: Cardiothoracic;  Laterality: N/A;  TWO VESSEL BYPASS WITH AORTOTOMY AND EXCISION OF ATHEROSCLEROTIC PLAQUE    CORONARY BYPASS GRAFT ANGIOGRAPHY  3/2/2020    Procedure: Bypass graft study;  Surgeon: Cora Cormier MD;  Location: [facility] CATH LAB;  Service: Cardiology;;    ELBOW BURSA SURGERY Right 2010    ENDOSCOPIC HARVEST OF VEIN Left 11/5/2018    Procedure: SURGICAL PROCUREMENT, VEIN, ENDOSCOPIC;  Surgeon: Bear De Luna MD;  Location: [facility] OR;  Service: Cardiothoracic;  Laterality: Left;    ESOPHAGOGASTRODUODENOSCOPY N/A 7/17/2019    Procedure: ESOPHAGOGASTRODUODENOSCOPY (EGD);  Surgeon: David Carter III, MD;  Location: [facility] ENDO;  Service: Endoscopy;  Laterality: N/A;    ESOPHAGOGASTRODUODENOSCOPY N/A 12/29/2022    Procedure:  ESOPHAGOGASTRODUODENOSCOPY (EGD);  Surgeon: Issa Gayle MD;  Location: [facility] ENDO;  Service: Endoscopy;  Laterality: N/A;    ESOPHAGOGASTRODUODENOSCOPY N/A 1/17/2024    Procedure: EGD (ESOPHAGOGASTRODUODENOSCOPY);  Surgeon: Issa Gayle MD;  Location: [facility] ENDO;  Service: Endoscopy;  Laterality: N/A;    ETHMOIDECTOMY Bilateral 3/27/2019    Procedure: ETHMOIDECTOMY;  Surgeon: Alejandro Parkinson MD;  Location: [facility] OR;  Service: ENT;  Laterality: Bilateral;    EYE SURGERY      FOOT SURGERY      FRONTAL SINUS OBLITERATION Bilateral 3/27/2019    Procedure: SINUSOTOMY, FRONTAL SINUS, OBLITERATIVE;  Surgeon: Alejandro Parkinson MD;  Location: [facility] OR;  Service: ENT;  Laterality: Bilateral;    FUNCTIONAL ENDOSCOPIC SINUS SURGERY (FESS) Bilateral 3/27/2019    Procedure: FESS (FUNCTIONAL ENDOSCOPIC SINUS SURGERY);  Surgeon: Alejandro Parkinson MD;  Location: [facility] OR;  Service: ENT;  Laterality: Bilateral;  Left Keila bullosa resection     INTRALUMINAL GASTROINTESTINAL TRACT IMAGING VIA CAPSULE N/A 2/2/2024    Procedure: IMAGING PROCEDURE, GI TRACT, INTRALUMINAL, VIA CAPSULE;  Surgeon: Cooper Estrella RN;  Location: [facility];  Service: Endoscopy;  Laterality: N/A;    KNEE ARTHROSCOPY W/ MENISCAL REPAIR Left 06/2019    dr boykin    KNEE SURGERY Right     LEFT HEART CATHETERIZATION Left 3/2/2020    Procedure: CATHETERIZATION, HEART, LEFT;  Surgeon: Cora Cormier MD;  Location: [facility] CATH LAB;  Service: Cardiology;  Laterality: Left;    LIVER BIOPSY  01/2022    MAXILLARY ANTROSTOMY Bilateral 3/27/2019    Procedure: MAXILLARY ANTROSTOMY;  Surgeon: Alejandro Parkinson MD;  Location: [facility] OR;  Service: ENT;  Laterality: Bilateral;    NASAL SEPTOPLASTY N/A 3/27/2019    Procedure: SEPTOPLASTY, NOSE;  Surgeon: Alejandro Parkinson MD;  Location: [facility] OR;  Service: ENT;  Laterality: N/A;    RECTAL SURGERY  2012    STENT, SUPERIOR MESENTERIC ARTERY Left 1/4/2024    Procedure: STENT, SUPERIOR MESENTERIC ARTERY;  Surgeon: ALEX Alfred III, MD;  Location: [facility]  OR 2ND FLR;  Service: Vascular;  Laterality: Left;    TRANSURETHRAL RESECTION OF PROSTATE (TURP) WITHOUT USE OF LASER N/A 6/19/2018    Procedure: TURP, WITHOUT USING LASER;  Surgeon: Cooper Cordova IV, MD;  Location: [facility];  Service: Urology;  Laterality: N/A;    TRIGGER FINGER RELEASE Left          Pre-op Assessment    I have reviewed the Patient Summary Reports.     I have reviewed the Nursing Notes. I have reviewed the NPO Status.   I have reviewed the Medications.     Review of Systems  Anesthesia Hx:  No problems with previous Anesthesia               Denies Personal Hx of Anesthesia complications.                    Social:  Former Smoker, Alcohol Use       Cardiovascular:  Exercise tolerance: good   Hypertension Valvular problems/Murmurs, AS  CAD   CABG/stent   Angina           Cardiovascular Symptoms: Angina       Coronary Artery Disease:                            Hypertension     Atrial Fibrillation     Pulmonary:   COPD Asthma    Sleep Apnea, CPAP   Asthma:   Chronic Obstructive Pulmonary Disease (COPD):           Obstructive Sleep Apnea (DELONTE).           Hepatic/GI:     GERD Liver Disease, Hepatitis, C    Gerd       Liver Disease, Hepatitis        Musculoskeletal:  Arthritis        Arthritis          Neurological:    Neuromuscular Disease,           Arthritis                         Neuromuscular Disease   Endocrine:  Diabetes Hypothyroidism   Diabetes                   Hypothyroidism            Results for orders placed or performed during the hospital encounter of 04/28/24   EKG 12-lead    Collection Time: 04/28/24  1:43 PM   Result Value Ref Range    QRS Duration 94 ms    [facility] QTC Calculation 438 ms    Narrative    Test Reason : I48.91,    Vent. Rate : 070 BPM     Atrial Rate : 070 BPM     P-R Int : 432 ms          QRS Dur : 094 ms      QT Int : 406 ms       P-R-T Axes : 065 042 064 degrees     QTc Int : 438 ms    Sinus rhythm with sinus arrhythmia with 1st degree A-V block  Otherwise normal  ECG  When compared with ECG of 25-MAR-2024 07:53,  Previous ECG has undetermined rhythm, needs review  Confirmed by PREMA CHRISTIANSON MD ([id number]) on 4/28/2024 9:36:12 PM    Referred By: [email or web address]   SELF           Confirmed By:PREMA CHRISTIANSON MD     Results for orders placed during the hospital encounter of 02/23/24    Echo Saline Bubble? No    Interpretation Summary    Left Ventricle: The left ventricle is normal in size. Normal wall thickness. There is mild concentric hypertrophy. There is normal systolic function with a visually estimated ejection fraction of 60 - 65%. There is normal diastolic function.    Right Ventricle: Normal right ventricular cavity size. Wall thickness is normal. Right ventricle wall motion  is normal. Systolic function is normal.    Left Atrium: Left atrium is severely dilated.    Aortic Valve: There is moderate to severe stenosis. Aortic valve area by VTI is 0.96 cm². Aortic valve peak velocity is 3.76 m/s. Mean gradient is 31 mmHg. The dimensionless index is 0.28. There is mild aortic regurgitation.    Mitral Valve: There is mild regurgitation.    Tricuspid Valve: There is moderate regurgitation.    Pulmonary Artery: There is moderate pulmonary hypertension. The estimated pulmonary artery systolic pressure is 58 mmHg.    IVC/SVC: Intermediate venous pressure at 8 mmHg.      Physical Exam  General: Well nourished, Cooperative, Alert and Oriented    Airway:  Mallampati: II   Mouth Opening: Normal  TM Distance: Normal  Tongue: Normal  Neck ROM: Normal ROM    Dental:  Partial Dentures  Dentures upper  Chest/Lungs:  Normal Respiratory Rate    Heart:  Rate: Normal  Rhythm: Regular Rhythm        Anesthesia Plan  Type of Anesthesia, risks & benefits discussed:    Anesthesia Type: MAC, Gen Natural Airway  Intra-op Monitoring Plan: Standard ASA Monitors  Induction:  IV  Informed Consent: Informed consent signed with the Patient and all parties understand the risks and agree with anesthesia plan.  All  questions answered.   ASA Score: 3  Day of Surgery Review of History & Physical: H&P Update referred to the surgeon/provider.    Ready For Surgery From Anesthesia Perspective.     .

## 2024-04-30 NOTE — PROVATION PATIENT INSTRUCTIONS
Discharge Summary/Instructions after an Endoscopic Procedure  Patient Name: Erendira Pretty  Patient MRN: [id number]  Patient YOB: 1945 Tuesday, April 30, 2024 Eder Foley MD  Dear patient,  As a result of recent federal legislation (The Federal Cures Act), you may   receive lab or pathology results from your procedure in your [facility]   account before your physician is able to contact you. Your physician or   their representative will relay the results to you with their   recommendations at their soonest availability.  Thank you,  RESTRICTIONS:  During your procedure today, you received medications for sedation.  These   medications may affect your judgment, balance and coordination.  Therefore,   for 24 hours, you have the following restrictions:   - DO NOT drive a car, operate machinery, make legal/financial decisions,   sign important papers or drink alcohol.    ACTIVITY:  Today: no heavy lifting, straining or running due to procedural   sedation/anesthesia.  The following day: return to full activity including work.  DIET:  Eat and drink normally unless instructed otherwise.     TREATMENT FOR COMMON SIDE EFFECTS:  - Mild abdominal pain, nausea, belching, bloating or excessive gas:  rest,   eat lightly and use a heating pad.  - Sore Throat: treat with throat lozenges and/or gargle with warm salt   water.  - Because air was used during the procedure, expelling large amounts of air   from your rectum or belching is normal.  - If a bowel prep was taken, you may not have a bowel movement for 1-3 days.    This is normal.  SYMPTOMS TO WATCH FOR AND REPORT TO YOUR PHYSICIAN:  1. Abdominal pain or bloating, other than gas cramps.  2. Chest pain.  3. Back pain.  4. Signs of infection such as: chills or fever occurring within 24 hours   after the procedure.  5. Rectal bleeding, which would show as bright red, maroon, or black stools.   (A tablespoon of blood from the rectum is not serious, especially if    hemorrhoids are present.)  6. Vomiting.  7. Weakness or dizziness.  GO DIRECTLY TO THE NEAREST EMERGENCY ROOM IF YOU HAVE ANY OF THE FOLLOWING:      Difficulty breathing              Chills and/or fever over 101 F   Persistent vomiting and/or vomiting blood   Severe abdominal pain   Severe chest pain   Black, tarry stools   Bleeding- more than one tablespoon   Any other symptom or condition that you feel may need urgent attention  Your doctor recommends these additional instructions:  If any biopsies were taken, your doctors clinic will contact you in 1 to 2   weeks with any results.  - [facility] to [facility].   - Resume previous diet.   - Continue present medications.   - Return to referring physician.   - Follow-up in GI clinic.  For questions, problems or results please call your physician Eder Foley MD at Work:  [phone]  If you have any questions about the above instructions, call the GI   department at [phone] or call the endoscopy unit at [phone]   from 7am until 3 pm.  [facility] [place], EMERGENCY ROOM PHONE NUMBER:   [phone]  IF A COMPLICATION OR EMERGENCY SITUATION ARISES AND YOU ARE UNABLE TO REACH   YOUR PHYSICIAN - GO DIRECTLY TO THE EMERGENCY ROOM.  I have read or have had read to me these discharge instructions for my   procedure and have received a written copy.  I understand these   instructions and will follow-up with my physician if I have any questions.     __________________________________       _____________________________________  Nurse Signature                                          Patient/Designated   Responsible Party Signature  Eder Foley MD  4/30/2024 9:45:18 AM  This report has been verified and signed electronically.  Dear patient,  As a result of recent federal legislation (The Federal Cures Act), you may   receive lab or pathology results from your procedure in your [facility]   account  before your physician is able to contact you. Your physician or   their representative will relay the results to you with their   recommendations at their soonest availability.  Thank you,  PROVATION

## 2024-04-30 NOTE — ANESTHESIA POSTPROCEDURE EVALUATION
Anesthesia Post Evaluation    Patient: Erendira Pretty    Procedure(s) Performed: Procedure(s) (LRB):  EGD (ESOPHAGOGASTRODUODENOSCOPY) (N/A)    Final Anesthesia Type: MAC      Patient location during evaluation: GI PACU  Patient participation: Yes- Able to Participate  Level of consciousness: awake and alert  Post-procedure vital signs: reviewed and stable  Pain management: adequate  Airway patency: patent    PONV status at discharge: No PONV  Anesthetic complications: no      Cardiovascular status: blood pressure returned to baseline and hemodynamically stable  Respiratory status: unassisted, spontaneous ventilation and room air  Hydration status: euvolemic  Follow-up not needed.              Vitals Value Taken Time   /89 04/30/24 0954   Temp 36.9 °C (98.4 °F) 04/30/24 0954   Pulse 71 04/30/24 0954   Resp 18 04/30/24 0954   SpO2 98 % 04/30/24 0954         Event Time   Out of Recovery 04/30/2024 10:00:37         Pain/Natalie Score: Natalie Score: 10 (4/30/2024  9:55 AM)

## 2024-04-30 NOTE — PROGRESS NOTES
Hgb stable but patient reported a black stool yesterday afternoon. Will plan on EGD today. Keep NPO.   Phi Cordero PA-C.

## 2024-04-30 NOTE — PLAN OF CARE
Dr Foley updated patient on results and plan of care. Verbalized understanding. Will transfer back to room.

## 2024-04-30 NOTE — BRIEF OP NOTE
Operative Note:     S/p EGD that revealed gastritis in antrum and duodenitis in the bulb.   Biopsies were obtained for H. Pylori.       Plan:   -Patient will need to continue his PPI at an increased dose   -He will discuss with cardiology the risks and benefits of the anticoagulation given that every time he restarts it, he experiences melena.   -Can advance diet   -Patient can be discharged home today.   -We will arrange follow-up in GI clinic .

## 2024-05-01 ENCOUNTER — OFFICE VISIT (OUTPATIENT)
Dept: CARDIOLOGY | Facility: CLINIC | Age: 79
End: 2024-05-01
Payer: MEDICARE

## 2024-05-01 VITALS
SYSTOLIC BLOOD PRESSURE: 137 MMHG | HEIGHT: 67 IN | RESPIRATION RATE: 16 BRPM | WEIGHT: 224.19 LBS | BODY MASS INDEX: 35.19 KG/M2 | HEART RATE: 78 BPM | OXYGEN SATURATION: 97 % | DIASTOLIC BLOOD PRESSURE: 75 MMHG

## 2024-05-01 VITALS
DIASTOLIC BLOOD PRESSURE: 56 MMHG | HEART RATE: 71 BPM | WEIGHT: 222.25 LBS | SYSTOLIC BLOOD PRESSURE: 111 MMHG | BODY MASS INDEX: 34.88 KG/M2 | HEIGHT: 67 IN

## 2024-05-01 VITALS
BODY MASS INDEX: 33.91 KG/M2 | HEIGHT: 67 IN | HEART RATE: 71 BPM | SYSTOLIC BLOOD PRESSURE: 163 MMHG | DIASTOLIC BLOOD PRESSURE: 89 MMHG | RESPIRATION RATE: 18 BRPM | OXYGEN SATURATION: 98 % | TEMPERATURE: 98 F | WEIGHT: 216.06 LBS

## 2024-05-01 DIAGNOSIS — Z95.1 S/P CABG X 2: ICD-10-CM

## 2024-05-01 DIAGNOSIS — T46.6X5A STATIN MYOPATHY: ICD-10-CM

## 2024-05-01 DIAGNOSIS — I48.0 PAROXYSMAL ATRIAL FIBRILLATION: Primary | ICD-10-CM

## 2024-05-01 DIAGNOSIS — I10 ESSENTIAL HYPERTENSION: Chronic | ICD-10-CM

## 2024-05-01 DIAGNOSIS — I49.3 PVC (PREMATURE VENTRICULAR CONTRACTION): ICD-10-CM

## 2024-05-01 DIAGNOSIS — I35.0 NONRHEUMATIC AORTIC VALVE STENOSIS: ICD-10-CM

## 2024-05-01 DIAGNOSIS — T45.515D ANTICOAGULANT CAUSING ADVERSE EFFECT IN THERAPEUTIC USE, SUBSEQUENT ENCOUNTER: ICD-10-CM

## 2024-05-01 DIAGNOSIS — I48.0 PAROXYSMAL ATRIAL FIBRILLATION: ICD-10-CM

## 2024-05-01 DIAGNOSIS — K21.00 GASTROESOPHAGEAL REFLUX DISEASE WITH ESOPHAGITIS WITHOUT HEMORRHAGE: ICD-10-CM

## 2024-05-01 DIAGNOSIS — I70.0 AORTIC ATHEROSCLEROSIS: Primary | ICD-10-CM

## 2024-05-01 DIAGNOSIS — I73.9 PVD (PERIPHERAL VASCULAR DISEASE): ICD-10-CM

## 2024-05-01 DIAGNOSIS — G72.0 STATIN MYOPATHY: ICD-10-CM

## 2024-05-01 DIAGNOSIS — G47.33 OSA ON CPAP: Chronic | ICD-10-CM

## 2024-05-01 DIAGNOSIS — I25.10 CORONARY ARTERY DISEASE INVOLVING NATIVE CORONARY ARTERY OF NATIVE HEART WITHOUT ANGINA PECTORIS: Chronic | ICD-10-CM

## 2024-05-01 PROCEDURE — 99999 PR PBB SHADOW E&M-EST. PATIENT-LVL IV: CPT | Mod: PBBFAC,,, | Performed by: INTERNAL MEDICINE

## 2024-05-01 PROCEDURE — 3074F SYST BP LT 130 MM HG: CPT | Mod: CPTII,S$GLB,, | Performed by: INTERNAL MEDICINE

## 2024-05-01 PROCEDURE — 3072F LOW RISK FOR RETINOPATHY: CPT | Mod: CPTII,S$GLB,, | Performed by: INTERNAL MEDICINE

## 2024-05-01 PROCEDURE — 3288F FALL RISK ASSESSMENT DOCD: CPT | Mod: CPTII,S$GLB,, | Performed by: INTERNAL MEDICINE

## 2024-05-01 PROCEDURE — 99999 PR PBB SHADOW E&M-EST. PATIENT-LVL V: CPT | Mod: PBBFAC,,, | Performed by: INTERNAL MEDICINE

## 2024-05-01 PROCEDURE — 1101F PT FALLS ASSESS-DOCD LE1/YR: CPT | Mod: CPTII,S$GLB,, | Performed by: INTERNAL MEDICINE

## 2024-05-01 PROCEDURE — 3075F SYST BP GE 130 - 139MM HG: CPT | Mod: CPTII,S$GLB,, | Performed by: INTERNAL MEDICINE

## 2024-05-01 PROCEDURE — 3078F DIAST BP <80 MM HG: CPT | Mod: CPTII,S$GLB,, | Performed by: INTERNAL MEDICINE

## 2024-05-01 PROCEDURE — 1159F MED LIST DOCD IN RCRD: CPT | Mod: CPTII,S$GLB,, | Performed by: INTERNAL MEDICINE

## 2024-05-01 PROCEDURE — 99214 OFFICE O/P EST MOD 30 MIN: CPT | Mod: S$GLB,,, | Performed by: INTERNAL MEDICINE

## 2024-05-01 PROCEDURE — 99213 OFFICE O/P EST LOW 20 MIN: CPT | Mod: S$GLB,,, | Performed by: INTERNAL MEDICINE

## 2024-05-01 PROCEDURE — 1157F ADVNC CARE PLAN IN RCRD: CPT | Mod: CPTII,S$GLB,, | Performed by: INTERNAL MEDICINE

## 2024-05-01 PROCEDURE — 1126F AMNT PAIN NOTED NONE PRSNT: CPT | Mod: CPTII,S$GLB,, | Performed by: INTERNAL MEDICINE

## 2024-05-01 PROCEDURE — 1160F RVW MEDS BY RX/DR IN RCRD: CPT | Mod: CPTII,S$GLB,, | Performed by: INTERNAL MEDICINE

## 2024-05-01 RX ORDER — EVOLOCUMAB 140 MG/ML
1 INJECTION, SOLUTION SUBCUTANEOUS
COMMUNITY
Start: 2024-04-13 | End: 2024-05-07

## 2024-05-01 RX ORDER — FAMOTIDINE 40 MG/1
40 TABLET, FILM COATED ORAL
COMMUNITY
Start: 2024-04-12

## 2024-05-01 NOTE — Clinical Note
He is off xarelto due to GI bleed last week. Restart 2 weeks before his ablation 6/24/24, or 6/10/24

## 2024-05-01 NOTE — PROGRESS NOTES
Subjective:   Patient ID:  Erendira Pretty is a 78 y.o. male who presents for follow-up of Hospital Follow Up (S/P GI bleed)  S/p EGD S/p EGD that revealed gastritis in antrum and duodenitis in the bulb.   H/H stable , no transfusion. Sx are melena  Pt to see Dr. Huerta.  Patient denies CP, angina or anginal equivalent.  Hypertension  This is a chronic problem. The current episode started more than 1 year ago. The problem has been gradually improving since onset. The problem is controlled. Pertinent negatives include no chest pain, palpitations or shortness of breath. Past treatments include ACE inhibitors, beta blockers and calcium channel blockers. The current treatment provides moderate improvement. There are no compliance problems.  Hypertensive end-organ damage includes CAD/MI.   Coronary Artery Disease  Presents for follow-up visit. Pertinent negatives include no chest pain, chest pressure, chest tightness, dizziness, leg swelling, muscle weakness, palpitations, shortness of breath or weight gain. The symptoms have been stable. Compliance with diet is variable. Compliance with exercise is variable. Compliance with medications is good.   Hyperlipidemia  This is a chronic problem. The current episode started more than 1 year ago. The problem is controlled. Pertinent negatives include no chest pain or shortness of breath. Treatments tried: repatha. The current treatment provides moderate improvement of lipids. There are no compliance problems.        Review of Systems   Constitutional: Negative. Negative for weight gain.   HENT: Negative.     Eyes: Negative.    Cardiovascular: Negative.  Negative for chest pain, leg swelling and palpitations.   Respiratory: Negative.  Negative for chest tightness and shortness of breath.    Endocrine: Negative.    Hematologic/Lymphatic: Negative.    Skin: Negative.    Musculoskeletal:  Negative for muscle weakness.   Gastrointestinal: Negative.    Genitourinary: Negative.     Neurological: Negative.  Negative for dizziness.   Psychiatric/Behavioral: Negative.     Allergic/Immunologic: Negative.    All other systems reviewed and are negative.    Family History   Problem Relation Name Age of Onset    Heart disease Mother      Heart disease Father      Heart disease Brother      Colon cancer Neg Hx       Past Medical History:   Diagnosis Date    Anticoagulant long-term use     Aortic stenosis     Asthma     Benign prostatic hyperplasia with nocturia     Bilateral carotid artery stenosis 07/21/2021     CAROTID BILATERAL 07/14/2021 IMPRESSION: Atherosclerosis with suspected stenosis greater than 50% at the right proximal ICA visually. Velocities are discordant and appear improved from prior. Atherosclerosis on the left with no evidence of flow-limiting stenosis greater than 50%.  FINDINGS: Right: Internal Carotid Artery (ICA): Peak systolic velocity 118 cm/sec. 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 09/22/2020    DELONTE on CPAP     Osteoarthritis     Paroxysmal atrial fibrillation     PVD (peripheral vascular disease)     Secondary esophageal varices without bleeding 06/26/2017    Type 2 diabetes mellitus      Social History     Socioeconomic History    Marital status:      Spouse name: YAEL    Number of children: 2   Tobacco Use    Smoking status: Former     Current packs/day: 0.00     Average packs/day: 0.5 packs/day for 4.0 years (2.0 total pack years)     Types: Cigarettes     Start date:  1968     Quit date: 1972     Years since quittin.9    Smokeless tobacco: Former     Types: Chew     Quit date: 1976   Substance and Sexual Activity    Alcohol use: Yes     Alcohol/week: 2.0 standard drinks of alcohol     Types: 2 Cans of beer per week    Drug use: No    Sexual activity: Yes     Partners: Female   Social History Narrative    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     Current Outpatient Medications on File Prior to Visit   Medication Sig Dispense Refill    ACCU-CHEK GRACE PLUS METER Misc AS DIRECTED 1 each 0    albuterol (VENTOLIN HFA) 90 mcg/actuation inhaler Inhale 2 puffs into the lungs every 6 (six) hours as needed for Wheezing or Shortness of Breath. Rescue 18 g 3    aspirin (ECOTRIN) 81 MG EC tablet Take 1 tablet (81 mg total) by mouth once daily. 90 tablet 3    blood sugar diagnostic (ACCU-CHEK GRACE PLUS TEST STRP) Strp TEST THREE TIMES A  strip 11    budesonide-formoterol 160-4.5 mcg (SYMBICORT) 160-4.5 mcg/actuation HFAA INHALE 2 PUFFS INTO THE LUNGS TWO TIMES A DAY. 10.2 g 11    carvediloL (COREG) 12.5 MG tablet Take 1 tablet (12.5 mg total) by mouth 2 (two) times daily with meals. 60 tablet 2    famotidine (PEPCID) 40 MG tablet Take 40 mg by mouth.      finasteride (PROSCAR) 5 mg tablet TAKE 1 TABLET BY MOUTH once daily (Patient taking differently: Take 5 mg by mouth nightly.) 90 tablet 2    furosemide (LASIX) 40 MG tablet Take 1 tablet (40 mg total) by mouth 2 (two) times daily. 60 tablet 11    GLUCOSAMINE HCL/CHONDR ROSARIO A NA (OSTEO BI-FLEX ORAL) Take 1 tablet by mouth 2 (two) times daily.       insulin (LANTUS SOLOSTAR U-100 INSULIN) glargine 100 units/mL SubQ pen Inject 44 Units into the skin every evening. 45 mL 1    JARDIANCE 25 mg tablet TAKE 1 TABLET BY MOUTH EVERY DAY 90 tablet 0    lancets (ACCU-CHEK FASTCLIX LANCET DRUM) Misc TEST TWO TIMES A  " each 5    levocetirizine (XYZAL) 5 MG tablet Take 1 tablet (5 mg total) by mouth every evening. 30 tablet 11    levothyroxine (SYNTHROID) 300 MCG Tab TAKE 1 TABLET BY MOUTH EVERY MORNING 90 tablet 3    levothyroxine (SYNTHROID) 50 MCG tablet Take 1 tablet (50 mcg total) by mouth before breakfast. 90 tablet 3    lisinopriL 10 MG tablet TAKE 1 BY MOUTH EVERY DAY 30 tablet 7    metFORMIN (GLUCOPHAGE-XR) 500 MG ER 24hr tablet Take 1 tablet (500 mg total) by mouth 2 (two) times daily with meals. 180 tablet 3    mirabegron (MYRBETRIQ) 25 mg Tb24 ER tablet Take 1 tablet (25 mg total) by mouth once daily. 30 tablet 11    montelukast (SINGULAIR) 10 mg tablet Take 1 tablet (10 mg total) by mouth once daily. 90 tablet 3    pen needle, diabetic (BD ULTRA-FINE MINI PEN NEEDLE) 31 gauge x 3/16" Ndle USE AS DIRECTED 100 each 11    propafenone (RHTHYMOL) 150 MG Tab Take 1 tablet (150 mg total) by mouth every 8 (eight) hours. 90 tablet 11    RABEprazole (ACIPHEX) 20 mg tablet Take 2 tablets (40 mg total) by mouth 2 (two) times daily. 120 tablet 1    ranolazine (RANEXA) 1,000 mg Tb12 Take 1 tablet (1,000 mg total) by mouth 2 (two) times daily. 60 tablet 2    REPATHA SURECLICK 140 mg/mL PnIj Inject 1 mL into the skin every 14 (fourteen) days.      sildenafiL (VIAGRA) 100 MG tablet Take 1/2 to 1 tablet by mouth one hour prior to intercourse. Max 100mg per day 30 tablet 11    rivaroxaban (XARELTO) 20 mg Tab Take 1 tablet (20 mg total) by mouth daily with dinner or evening meal. (Patient not taking: Reported on 5/1/2024) 30 tablet 11     Current Facility-Administered Medications on File Prior to Visit   Medication Dose Route Frequency Provider Last Rate Last Admin    lactated ringers infusion   Intravenous Continuous Omaira Theodore MD   New Bag at 09/09/19 1117    [DISCONTINUED] carvediloL tablet 12.5 mg  12.5 mg Oral BID  Halina Villaseñor NP   12.5 mg at 04/30/24 0855    [DISCONTINUED] dextrose 10% bolus 125 mL " 125 mL  12.5 g Intravenous PRN Halina Villaseñor NP        [DISCONTINUED] dextrose 10% bolus 250 mL 250 mL  25 g Intravenous PRN Halina Villaseñor NP        [DISCONTINUED] finasteride tablet 5 mg  5 mg Oral Nightly Halina Villaseñor NP   5 mg at 04/29/24 2118    [DISCONTINUED] glucagon (human recombinant) injection 1 mg  1 mg Intramuscular PRN Halina Villaseñor NP        [DISCONTINUED] glucose chewable tablet 16 g  16 g Oral PRN Halina Villaseñor NP        [DISCONTINUED] glucose chewable tablet 24 g  24 g Oral PRN Halina Villaseñor NP        [DISCONTINUED] insulin aspart U-100 pen 0-5 Units  0-5 Units Subcutaneous QID (AC + HS) PRHalina Anderson NP        [DISCONTINUED] lactated ringers infusion   Intravenous Continuous PRN Stacie Lyons CRNA   New Bag at 04/30/24 0930    [DISCONTINUED] levothyroxine tablet 300 mcg  300 mcg Oral Before breakfast Halina Villaseñor NP   300 mcg at 04/30/24 0533    [DISCONTINUED] LIDOcaine (PF) 10 mg/ml (1%) injection   Intravenous PRN Stacie Lyons CRNA   100 mg at 04/30/24 0934    [DISCONTINUED] naloxone 0.4 mg/mL injection 0.02 mg  0.02 mg Intravenous PRN Halina Villaseñor NP        [DISCONTINUED] ondansetron injection 4 mg  4 mg Intravenous Q8H PRN Halina Villaseñor NP        [DISCONTINUED] pantoprazole injection 40 mg  40 mg Intravenous BID Halina Villaseñor NP   40 mg at 04/30/24 0855    [DISCONTINUED] propafenone tablet 150 mg  150 mg Oral Q8H Halina Villaseñor NP   150 mg at 04/30/24 0621    [DISCONTINUED] propofol (DIPRIVAN) 10 mg/mL infusion   Intravenous PRN Stacie Lyons CRNA   20 mg at 04/30/24 0936    [DISCONTINUED] ranolazine 12 hr tablet 1,000 mg  1,000 mg Oral BID Halina Villaseñor NP   1,000 mg at 04/30/24 0855    [DISCONTINUED] sodium chloride 0.9% flush 10 mL  10 mL Intravenous Q12H PRN Halina Villaseñor NP         Review of patient's allergies indicates:   Allergen Reactions    Lipitor [atorvastatin]  Other (See Comments)     Muscle aches and pains as well as fatigue.     Niacin preparations Other (See Comments)     Causes broken blood vessels and bruises at 4 times normal dose.    Statins-hmg-coa reductase inhibitors Other (See Comments)     Statins cause intolerable myalgias.    Iodinated contrast media Hives     Tachycardia    Omeprazole Hives and Itching    Prilosec [omeprazole magnesium] Hives and Itching       Objective:     Physical Exam  Vitals and nursing note reviewed.   Constitutional:       Appearance: He is well-developed.   HENT:      Head: Normocephalic and atraumatic.   Eyes:      Conjunctiva/sclera: Conjunctivae normal.      Pupils: Pupils are equal, round, and reactive to light.   Cardiovascular:      Rate and Rhythm: Normal rate and regular rhythm.      Pulses: Intact distal pulses.      Heart sounds: Normal heart sounds. Murmur: 2/6 AS murmur.   Pulmonary:      Effort: Pulmonary effort is normal.      Breath sounds: Normal breath sounds.   Abdominal:      General: Bowel sounds are normal.      Palpations: Abdomen is soft.   Musculoskeletal:      Cervical back: Normal range of motion and neck supple.   Skin:     General: Skin is warm and dry.   Neurological:      Mental Status: He is alert and oriented to person, place, and time.         Assessment:     1. Aortic atherosclerosis    2. Coronary artery disease involving native coronary artery of native heart without angina pectoris    3. Essential hypertension    4. Nonrheumatic aortic valve stenosis    5. Paroxysmal atrial fibrillation    6. PVC (premature ventricular contraction)    7. PVD (peripheral vascular disease)    8. S/P CABG x 2    9. Anticoagulant causing adverse effect in therapeutic use, subsequent encounter    10. DELONTE on CPAP    11. Statin myopathy    12. Gastroesophageal reflux disease with esophagitis without hemorrhage        Plan:     Aortic atherosclerosis    Coronary artery disease involving native coronary artery of native  heart without angina pectoris    Essential hypertension    Nonrheumatic aortic valve stenosis    Paroxysmal atrial fibrillation    PVC (premature ventricular contraction)    PVD (peripheral vascular disease)    S/P CABG x 2    Anticoagulant causing adverse effect in therapeutic use, subsequent encounter    DELONTE on CPAP    Statin myopathy    Gastroesophageal reflux disease with esophagitis without hemorrhage    Hold xarelto restart asa  Continue lisinopril , norvasc , coreg- HTN  continue repatha - HLP  Continue propafenone,   - PAF  Note he will needs xarelto 6 weeks post watchman     Consider leqvio

## 2024-05-01 NOTE — PROGRESS NOTES
Subjective   Patient ID:  Erendira Pretty is a 78 y.o. male who presents for follow-up of Atrial Fibrillation      78 yoM CAD/CABG here for arrhythmia management.     3/24: AF noted 8/23. He was stared on rythmol but no rhythm control via CV attempt was made. He remained in AF 2/7/24, SR with long first degree AVB later in February. He had a vascular intervention in his abdomen 1/24 with subsequent GI bleeding later that month. He has been off OAC since the GI bleeding event. Xarelto was restarted    Interval history: GI bleeding 4/27/24 with no source identified. Off xarelto since discharge. Remains in SR on rythmol with first degree AVB. Due for PVI 6/24/24, LAAO 6w later    CHADSVASC of 4  HAS BLED of 4    Echo 2/24:  ·  Left Ventricle: The left ventricle is normal in size. Normal wall thickness. There is mild concentric hypertrophy. There is normal systolic function with a visually estimated ejection fraction of 60 - 65%. There is normal diastolic function.  ·  Right Ventricle: Normal right ventricular cavity size. Wall thickness is normal. Right ventricle wall motion  is normal. Systolic function is normal.  ·  Left Atrium: Left atrium is severely dilated.  ·  Aortic Valve: There is moderate to severe stenosis. Aortic valve area by VTI is 0.96 cm². Aortic valve peak velocity is 3.76 m/s. Mean gradient is 31 mmHg. The dimensionless index is 0.28. There is mild aortic regurgitation.  ·  Mitral Valve: There is mild regurgitation.  ·  Tricuspid Valve: There is moderate regurgitation.  ·  Pulmonary Artery: There is moderate pulmonary hypertension. The estimated pulmonary artery systolic pressure is 58 mmHg.  ·  IVC/SVC: Intermediate venous pressure at 8 mmHg.    Past Medical History:  No date: Anticoagulant long-term use  No date: Aortic stenosis  No date: Asthma  No date: Benign prostatic hyperplasia with nocturia  07/21/2021: Bilateral carotid artery stenosis      Comment:  US CAROTID BILATERAL 07/14/2021  IMPRESSION:                Atherosclerosis with suspected stenosis greater than 50%                at the right proximal ICA visually. Velocities are                discordant and appear improved from prior.                Atherosclerosis on the left with no evidence of                flow-limiting stenosis greater than 50%.  FINDINGS:                Right: Internal Carotid Artery (ICA): Peak systolic                velocity 118 cm/sec. End diastolic velocity 35 cm/sec  No date: BPH (benign prostatic hyperplasia)  No date: CAD (coronary artery disease)      Comment:  40% lesion 2002;lazo  No date: Cirrhosis  04/09/2014: Claudication  No date: Colon polyp  No date: COPD (chronic obstructive pulmonary disease)  No date: ED (erectile dysfunction)  No date: Encounter for blood transfusion  No date: Ex-smoker  No date: Hearing loss NEC  No date: Hepatitis C      Comment:  Cured;yluy brownoni 2015  No date: Hyperlipidemia  No date: Hypertension  No date: Hypothyroid      Comment:  s/p tx graves  09/22/2020: Open angle with borderline findings and low glaucoma risk   in both eyes  No date: DELONTE on CPAP  No date: Osteoarthritis  No date: Paroxysmal atrial fibrillation  No date: PVD (peripheral vascular disease)  06/26/2017: Secondary esophageal varices without bleeding  No date: Type 2 diabetes mellitus    Past Surgical History:  1/4/2024: ANGIOGRAM, EXTREMITY, UNILATERAL; Left      Comment:  Procedure: ANGIOGRAM, EXTREMITY, UNILATERAL- Femoral /                SMS Stent, Poss General;  Surgeon: ALEX Alfred III, MD;  Location: [facility] OR [address] [place] [address];  Service: Vascular;                 Laterality: Left;  mGy : 2698.78Gy.cm :                229.88Contrast : 62mlFluro time : 13.8min  No date: CARDIAC CATHETERIZATION  No date: CARDIAC SURGERY  No date: CARPAL TUNNEL RELEASE; Left  No date: CATARACT EXTRACTION  2008: CATARACT EXTRACTION W/ INTRAOCULAR LENS  IMPLANT, BILATERAL  11/2/2018: CATHETERIZATION OF  BOTH LEFT AND RIGHT HEART; N/A      Comment:  Procedure: CATHETERIZATION, HEART, BOTH LEFT AND RIGHT;                Surgeon: Frank Gallardo MD;  Location: [facility] CATH                LAB;  Service: Cardiology;  Laterality: N/A;  8/2013: COLONOSCOPY  5/30/2016: COLONOSCOPY; N/A      Comment:  Procedure: COLONOSCOPY;  Surgeon: Anna Tomas MD;                 Location: [facility] ENDO;  Service: Endoscopy;  Laterality:                N/A;  7/17/2019: COLONOSCOPY; N/A      Comment:  Procedure: COLONOSCOPY;  Surgeon: David Carter III, MD;  Location: [facility] ENDO;  Service: Endoscopy;                 Laterality: N/A;  12/14/2023: COLONOSCOPY; N/A      Comment:  Procedure: COLONOSCOPY;  Surgeon: Ama Barrera MD;  Location: [facility] ENDO;  Service: Endoscopy;                 Laterality: N/A;  9/9/2019: COMPUTED TOMOGRAPHY; N/A      Comment:  Procedure: CT (COMPUTED TOMOGRAPHY);  Surgeon: [facility]                Diagnostic Provider;  Location: [facility] OR;  Service:                General;  Laterality: N/A;  Microwave ablation to be done               in CT.Need CRNA and cart  1/25/2022: COMPUTED TOMOGRAPHY; N/A      Comment:  Procedure: Liver Microwave Ablation;  Surgeon: Red Puentes MD;  Location: [facility];  Service: General;                 Laterality: N/A;  11/5/2018: CORONARY ARTERY BYPASS GRAFT (CABG); N/A      Comment:  Procedure: CORONARY ARTERY BYPASS GRAFT (CABG);                 Surgeon: Bear De Luna MD;  Location: [facility] OR;                 Service: Cardiothoracic;  Laterality: N/A;  TWO VESSEL                BYPASS WITH AORTOTOMY AND EXCISION OF ATHEROSCLEROTIC                PLAQUE  3/2/2020: CORONARY BYPASS GRAFT ANGIOGRAPHY      Comment:  Procedure: Bypass graft study;  Surgeon: Cora Cormier MD;  Location: [facility] CATH LAB;  Service: Cardiology;;  2010: ELBOW BURSA SURGERY; Right  11/5/2018: ENDOSCOPIC HARVEST OF VEIN; Left      Comment:  Procedure:  SURGICAL PROCUREMENT, VEIN, ENDOSCOPIC;                 Surgeon: Bear De Luna MD;  Location: [facility];                 Service: Cardiothoracic;  Laterality: Left;  7/17/2019: ESOPHAGOGASTRODUODENOSCOPY; N/A      Comment:  Procedure: ESOPHAGOGASTRODUODENOSCOPY (EGD);  Surgeon:                David Carter III, MD;  Location: [facility];  Service:               Endoscopy;  Laterality: N/A;  12/29/2022: ESOPHAGOGASTRODUODENOSCOPY; N/A      Comment:  Procedure: ESOPHAGOGASTRODUODENOSCOPY (EGD);  Surgeon:                Issa Gayle MD;  Location: [facility];  Service:               Endoscopy;  Laterality: N/A;  1/17/2024: ESOPHAGOGASTRODUODENOSCOPY; N/A      Comment:  Procedure: EGD (ESOPHAGOGASTRODUODENOSCOPY);  Surgeon:                Issa Gayle MD;  Location: [facility];                 Service: Endoscopy;  Laterality: N/A;  3/27/2019: ETHMOIDECTOMY; Bilateral      Comment:  Procedure: ETHMOIDECTOMY;  Surgeon: Alejandro Parkinson MD;                 Location: [facility];  Service: ENT;  Laterality: Bilateral;  No date: EYE SURGERY  No date: FOOT SURGERY  3/27/2019: FRONTAL SINUS OBLITERATION; Bilateral      Comment:  Procedure: SINUSOTOMY, FRONTAL SINUS, OBLITERATIVE;                 Surgeon: Alejandro Parkinson MD;  Location: [facility];  Service:                ENT;  Laterality: Bilateral;  3/27/2019: FUNCTIONAL ENDOSCOPIC SINUS SURGERY (FESS); Bilateral      Comment:  Procedure: FESS (FUNCTIONAL ENDOSCOPIC SINUS SURGERY);                 Surgeon: Alejandro Parkinson MD;  Location: [facility];  Service:                ENT;  Laterality: Bilateral;  Left Keila bullosa                resection   2/2/2024: INTRALUMINAL GASTROINTESTINAL TRACT IMAGING VIA CAPSULE; N/A      Comment:  Procedure: IMAGING PROCEDURE, GI TRACT, INTRALUMINAL,                VIA CAPSULE;  Surgeon: Cooper Estrella RN;  Location: [facility];  Service: Endoscopy;  Laterality: N/A;  06/2019: KNEE ARTHROSCOPY W/ MENISCAL REPAIR; Left      Comment:   dr boykin  No date: KNEE SURGERY; Right  3/2/2020: LEFT HEART CATHETERIZATION; Left      Comment:  Procedure: CATHETERIZATION, HEART, LEFT;  Surgeon: Cora Cormier MD;  Location: [facility] CATH LAB;  Service:                Cardiology;  Laterality: Left;  2022: LIVER BIOPSY  3/27/2019: MAXILLARY ANTROSTOMY; Bilateral      Comment:  Procedure: MAXILLARY ANTROSTOMY;  Surgeon: Alejandro Parkinson MD;  Location: [facility] OR;  Service: ENT;  Laterality:                Bilateral;  3/27/2019: NASAL SEPTOPLASTY; N/A      Comment:  Procedure: SEPTOPLASTY, NOSE;  Surgeon: Alejandro Parkinson MD;                 Location: [facility] OR;  Service: ENT;  Laterality: N/A;  2012: RECTAL SURGERY  2024: STENT, SUPERIOR MESENTERIC ARTERY; Left      Comment:  Procedure: STENT, SUPERIOR MESENTERIC ARTERY;  Surgeon:                ALEX Alfred III, MD;  Location: [address];                 Service: Vascular;  Laterality: Left;  2018: TRANSURETHRAL RESECTION OF PROSTATE (TURP) WITHOUT USE OF   LASER; N/A      Comment:  Procedure: TURP, WITHOUT USING LASER;  Surgeon: Cooper Cordova IV, MD;  Location: [facility] OR;  Service: Urology;                 Laterality: N/A;  No date: TRIGGER FINGER RELEASE; Left    Social History    Socioeconomic History      Marital status:       Spouse name: YAEL      Number of children: 2    Tobacco Use      Smoking status: Former        Packs/day: 0.00        Years: 0.5 packs/day for 4.0 years (2.0 ttl pk-yrs)        Types: Cigarettes        Start date: 1968        Quit date: 1972        Years since quittin.9      Smokeless tobacco: Former        Types: Chew        Quit date: 1976    Substance and Sexual Activity      Alcohol use: Yes        Alcohol/week: 2.0 standard drinks of alcohol        Types: 2 Cans of beer per week      Drug use: No      Sexual activity: Yes        Partners: Female    Social History Narrative      Has 2 children. Patient  retired as  at [employer].       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      No pets or smokers in household, lives alone.      Review of patient's family history indicates:  Problem: Heart disease      Relation: Mother          Name:               Age of Onset: (Not Specified)  Problem: Heart disease      Relation: Father          Name:               Age of Onset: (Not Specified)  Problem: Heart disease      Relation: Brother          Name:               Age of Onset: (Not Specified)  Problem: Colon cancer      Relation: Neg Hx          Name:               Age of Onset: (Not Specified)      Current Outpatient Medications:  ACCU-CHEK GRACE PLUS METER Misc, AS DIRECTED, Disp: 1 each, Rfl: 0  albuterol (VENTOLIN HFA) 90 mcg/actuation inhaler, Inhale 2 puffs into the lungs every 6 (six) hours as needed for Wheezing or Shortness of Breath. Rescue, Disp: 18 g, Rfl: 3  aspirin (ECOTRIN) 81 MG EC tablet, Take 1 tablet (81 mg total) by mouth once daily., Disp: 90 tablet, Rfl: 3  blood sugar diagnostic (ACCU-CHEK GRACE PLUS TEST STRP) Strp, TEST THREE TIMES A DAY, Disp: 100 strip, Rfl: 11  budesonide-formoterol 160-4.5 mcg (SYMBICORT) 160-4.5 mcg/actuation HFAA, INHALE 2 PUFFS INTO THE LUNGS TWO TIMES A DAY., Disp: 10.2 g, Rfl: 11  carvediloL (COREG) 12.5 MG tablet, Take 1 tablet (12.5 mg total) by mouth 2 (two) times daily with meals., Disp: 60 tablet, Rfl: 2  finasteride (PROSCAR) 5 mg tablet, TAKE 1 TABLET BY MOUTH once daily (Patient taking differently: Take 5 mg by mouth nightly.), Disp: 90 tablet, Rfl: 2  furosemide (LASIX) 40 MG tablet, Take 1 tablet (40 mg total) by mouth 2 (two) times daily., Disp: 60 tablet, Rfl: 11  GLUCOSAMINE HCL/CHONDR ROSARIO A NA (OSTEO BI-FLEX ORAL), Take 1 tablet by mouth 2 (two) times daily. , Disp: , Rfl:   insulin (LANTUS SOLOSTAR U-100 INSULIN) glargine 100 units/mL SubQ pen, Inject 44 Units into the skin every evening., Disp: 45 mL, Rfl: 1  JARDIANCE 25 mg tablet, TAKE 1 TABLET BY  "MOUTH EVERY DAY, Disp: 90 tablet, Rfl: 0  lancets (ACCU-CHEK FASTCLIX LANCET DRUM) [facility], TEST TWO TIMES A DAY, Disp: 200 each, Rfl: 5  levocetirizine (XYZAL) 5 MG tablet, Take 1 tablet (5 mg total) by mouth every evening., Disp: 30 tablet, Rfl: 11  levothyroxine (SYNTHROID) 300 MCG Tab, TAKE 1 TABLET BY MOUTH EVERY MORNING, Disp: 90 tablet, Rfl: 3  levothyroxine (SYNTHROID) 50 MCG tablet, Take 1 tablet (50 mcg total) by mouth before breakfast., Disp: 90 tablet, Rfl: 3  lisinopriL 10 MG tablet, TAKE 1 BY MOUTH EVERY DAY, Disp: 30 tablet, Rfl: 7  metFORMIN (GLUCOPHAGE-XR) 500 MG ER 24hr tablet, Take 1 tablet (500 mg total) by mouth 2 (two) times daily with meals., Disp: 180 tablet, Rfl: 3  mirabegron (MYRBETRIQ) 25 mg Tb24 ER tablet, Take 1 tablet (25 mg total) by mouth once daily., Disp: 30 tablet, Rfl: 11  montelukast (SINGULAIR) 10 mg tablet, Take 1 tablet (10 mg total) by mouth once daily., Disp: 90 tablet, Rfl: 3  pen needle, diabetic (BD ULTRA-FINE MINI PEN NEEDLE) 31 gauge x 3/16" Ndle, USE AS DIRECTED, Disp: 100 each, Rfl: 11  propafenone (RHTHYMOL) 150 MG Tab, Take 1 tablet (150 mg total) by mouth every 8 (eight) hours., Disp: 90 tablet, Rfl: 11  RABEprazole (ACIPHEX) 20 mg tablet, Take 2 tablets (40 mg total) by mouth 2 (two) times daily., Disp: 120 tablet, Rfl: 1  ranolazine (RANEXA) 1,000 mg Tb12, Take 1 tablet (1,000 mg total) by mouth 2 (two) times daily., Disp: 60 tablet, Rfl: 2  rivaroxaban (XARELTO) 20 mg Tab, Take 1 tablet (20 mg total) by mouth daily with dinner or evening meal., Disp: 30 tablet, Rfl: 11  sildenafiL (VIAGRA) 100 MG tablet, Take 1/2 to 1 tablet by mouth one hour prior to intercourse. Max 100mg per day, Disp: 30 tablet, Rfl: 11    No current facility-administered medications for this visit.  Facility-Administered Medications Ordered in Other Visits:  lactated ringers infusion, , Intravenous, Continuous, Omaira Theodore MD, New Bag at 09/09/19 1117            Review of Systems "   Constitutional: Negative.   HENT:  Positive for odynophagia.    Eyes: Negative.    Cardiovascular:  Negative for chest pain, dyspnea on exertion, leg swelling, near-syncope, palpitations and syncope.   Respiratory: Negative.  Negative for shortness of breath.    Endocrine: Negative.    Hematologic/Lymphatic: Negative.    Skin: Negative.    Musculoskeletal: Negative.    Gastrointestinal:  Positive for melena.   Genitourinary: Negative.    Neurological: Negative.  Negative for dizziness and light-headedness.   Psychiatric/Behavioral: Negative.     Allergic/Immunologic: Negative.           Objective     Physical Exam  Vitals and nursing note reviewed.   Constitutional:       Appearance: He is well-developed.   HENT:      Head: Normocephalic and atraumatic.   Eyes:      Conjunctiva/sclera: Conjunctivae normal.      Pupils: Pupils are equal, round, and reactive to light.   Cardiovascular:      Rate and Rhythm: Normal rate and regular rhythm.      Pulses: Intact distal pulses.      Heart sounds: Normal heart sounds.   Pulmonary:      Effort: Pulmonary effort is normal.      Breath sounds: Normal breath sounds.   Abdominal:      General: Bowel sounds are normal.      Palpations: Abdomen is soft.   Musculoskeletal:      Cervical back: Normal range of motion and neck supple.   Skin:     General: Skin is warm and dry.   Neurological:      Mental Status: He is alert and oriented to person, place, and time.            Assessment and Plan     1. Paroxysmal atrial fibrillation    2. S/P CABG x 2        Plan:  78 yoM CAD/CABG here for AF management. GI bleeding on xarelto, stopped off xarelto. Still will plan on PVI late June. Start xarelto 2 weeks prior to ablation. LAAO 4-6 weeks from the ablation

## 2024-05-03 ENCOUNTER — LAB VISIT (OUTPATIENT)
Dept: LAB | Facility: HOSPITAL | Age: 79
End: 2024-05-03
Attending: NURSE PRACTITIONER
Payer: MEDICARE

## 2024-05-03 DIAGNOSIS — T45.515S: ICD-10-CM

## 2024-05-03 DIAGNOSIS — C22.0 HCC (HEPATOCELLULAR CARCINOMA): ICD-10-CM

## 2024-05-03 DIAGNOSIS — K74.69 OTHER CIRRHOSIS OF LIVER: ICD-10-CM

## 2024-05-03 DIAGNOSIS — D50.0 IRON DEFICIENCY ANEMIA DUE TO CHRONIC BLOOD LOSS: ICD-10-CM

## 2024-05-03 DIAGNOSIS — D64.9 ANEMIA, UNSPECIFIED TYPE: ICD-10-CM

## 2024-05-03 LAB
ALBUMIN SERPL BCP-MCNC: 3.6 G/DL (ref 3.5–5.2)
ALP SERPL-CCNC: 60 U/L (ref 55–135)
ALT SERPL W/O P-5'-P-CCNC: 13 U/L (ref 10–44)
ANION GAP SERPL CALC-SCNC: 9 MMOL/L (ref 8–16)
AST SERPL-CCNC: 20 U/L (ref 10–40)
BASOPHILS # BLD AUTO: 0.03 K/UL (ref 0–0.2)
BASOPHILS NFR BLD: 0.7 % (ref 0–1.9)
BILIRUB SERPL-MCNC: 0.4 MG/DL (ref 0.1–1)
BUN SERPL-MCNC: 20 MG/DL (ref 8–23)
CALCIUM SERPL-MCNC: 9.2 MG/DL (ref 8.7–10.5)
CHLORIDE SERPL-SCNC: 107 MMOL/L (ref 95–110)
CO2 SERPL-SCNC: 24 MMOL/L (ref 23–29)
CREAT SERPL-MCNC: 1.4 MG/DL (ref 0.5–1.4)
DIFFERENTIAL METHOD BLD: ABNORMAL
EOSINOPHIL # BLD AUTO: 0.1 K/UL (ref 0–0.5)
EOSINOPHIL NFR BLD: 2.4 % (ref 0–8)
ERYTHROCYTE [DISTWIDTH] IN BLOOD BY AUTOMATED COUNT: 23.6 % (ref 11.5–14.5)
EST. GFR  (NO RACE VARIABLE): 52 ML/MIN/1.73 M^2
FERRITIN SERPL-MCNC: 23 NG/ML (ref 20–300)
FINAL PATHOLOGIC DIAGNOSIS: NORMAL
GLUCOSE SERPL-MCNC: 165 MG/DL (ref 70–110)
GROSS: NORMAL
HCT VFR BLD AUTO: 31.5 % (ref 40–54)
HGB BLD-MCNC: 9.6 G/DL (ref 14–18)
IMM GRANULOCYTES # BLD AUTO: 0.01 K/UL (ref 0–0.04)
IMM GRANULOCYTES NFR BLD AUTO: 0.2 % (ref 0–0.5)
IRON SERPL-MCNC: 23 UG/DL (ref 45–160)
LYMPHOCYTES # BLD AUTO: 1.1 K/UL (ref 1–4.8)
LYMPHOCYTES NFR BLD: 26.1 % (ref 18–48)
Lab: NORMAL
MCH RBC QN AUTO: 27.2 PG (ref 27–31)
MCHC RBC AUTO-ENTMCNC: 30.5 G/DL (ref 32–36)
MCV RBC AUTO: 89 FL (ref 82–98)
MICROSCOPIC EXAM: NORMAL
MONOCYTES # BLD AUTO: 0.4 K/UL (ref 0.3–1)
MONOCYTES NFR BLD: 9.5 % (ref 4–15)
NEUTROPHILS # BLD AUTO: 2.6 K/UL (ref 1.8–7.7)
NEUTROPHILS NFR BLD: 61.1 % (ref 38–73)
NRBC BLD-RTO: 0 /100 WBC
PLATELET # BLD AUTO: 207 K/UL (ref 150–450)
PMV BLD AUTO: 9.5 FL (ref 9.2–12.9)
POTASSIUM SERPL-SCNC: 4.3 MMOL/L (ref 3.5–5.1)
PROT SERPL-MCNC: 7 G/DL (ref 6–8.4)
RBC # BLD AUTO: 3.53 M/UL (ref 4.6–6.2)
SATURATED IRON: 5 % (ref 20–50)
SODIUM SERPL-SCNC: 140 MMOL/L (ref 136–145)
TOTAL IRON BINDING CAPACITY: 422 UG/DL (ref 250–450)
TRANSFERRIN SERPL-MCNC: 285 MG/DL (ref 200–375)
WBC # BLD AUTO: 4.22 K/UL (ref 3.9–12.7)

## 2024-05-03 PROCEDURE — 82728 ASSAY OF FERRITIN: CPT | Performed by: NURSE PRACTITIONER

## 2024-05-03 PROCEDURE — 36415 COLL VENOUS BLD VENIPUNCTURE: CPT | Mod: PO | Performed by: NURSE PRACTITIONER

## 2024-05-03 PROCEDURE — 85025 COMPLETE CBC W/AUTO DIFF WBC: CPT | Performed by: NURSE PRACTITIONER

## 2024-05-03 PROCEDURE — 80053 COMPREHEN METABOLIC PANEL: CPT | Performed by: NURSE PRACTITIONER

## 2024-05-03 PROCEDURE — 83540 ASSAY OF IRON: CPT | Performed by: NURSE PRACTITIONER

## 2024-05-06 ENCOUNTER — OFFICE VISIT (OUTPATIENT)
Dept: HEMATOLOGY/ONCOLOGY | Facility: CLINIC | Age: 79
End: 2024-05-06
Payer: MEDICARE

## 2024-05-06 ENCOUNTER — OFFICE VISIT (OUTPATIENT)
Dept: INTERNAL MEDICINE | Facility: CLINIC | Age: 79
End: 2024-05-06
Payer: MEDICARE

## 2024-05-06 VITALS
HEIGHT: 67 IN | WEIGHT: 224 LBS | BODY MASS INDEX: 35.16 KG/M2 | TEMPERATURE: 98 F | DIASTOLIC BLOOD PRESSURE: 62 MMHG | SYSTOLIC BLOOD PRESSURE: 110 MMHG

## 2024-05-06 DIAGNOSIS — I10 ESSENTIAL HYPERTENSION: Chronic | ICD-10-CM

## 2024-05-06 DIAGNOSIS — Z79.4 TYPE 2 DIABETES MELLITUS WITH MICROALBUMINURIA, WITH LONG-TERM CURRENT USE OF INSULIN: Chronic | ICD-10-CM

## 2024-05-06 DIAGNOSIS — D50.0 IRON DEFICIENCY ANEMIA DUE TO CHRONIC BLOOD LOSS: ICD-10-CM

## 2024-05-06 DIAGNOSIS — K29.71 GASTRITIS WITH HEMORRHAGE, UNSPECIFIED CHRONICITY, UNSPECIFIED GASTRITIS TYPE: ICD-10-CM

## 2024-05-06 DIAGNOSIS — D64.9 NORMOCYTIC ANEMIA: ICD-10-CM

## 2024-05-06 DIAGNOSIS — K29.80 DUODENITIS: ICD-10-CM

## 2024-05-06 DIAGNOSIS — D53.9 NUTRITIONAL ANEMIA, UNSPECIFIED: ICD-10-CM

## 2024-05-06 DIAGNOSIS — K92.1 MELENA: Primary | ICD-10-CM

## 2024-05-06 DIAGNOSIS — I48.0 PAROXYSMAL ATRIAL FIBRILLATION: ICD-10-CM

## 2024-05-06 DIAGNOSIS — E11.29 TYPE 2 DIABETES MELLITUS WITH MICROALBUMINURIA, WITH LONG-TERM CURRENT USE OF INSULIN: Chronic | ICD-10-CM

## 2024-05-06 DIAGNOSIS — R80.9 TYPE 2 DIABETES MELLITUS WITH MICROALBUMINURIA, WITH LONG-TERM CURRENT USE OF INSULIN: Chronic | ICD-10-CM

## 2024-05-06 DIAGNOSIS — T45.515S: Primary | ICD-10-CM

## 2024-05-06 DIAGNOSIS — H61.23 BILATERAL IMPACTED CERUMEN: ICD-10-CM

## 2024-05-06 PROCEDURE — 1159F MED LIST DOCD IN RCRD: CPT | Mod: CPTII,95,, | Performed by: NURSE PRACTITIONER

## 2024-05-06 PROCEDURE — 1157F ADVNC CARE PLAN IN RCRD: CPT | Mod: CPTII,95,, | Performed by: NURSE PRACTITIONER

## 2024-05-06 PROCEDURE — 3288F FALL RISK ASSESSMENT DOCD: CPT | Mod: CPTII,S$GLB,, | Performed by: PEDIATRICS

## 2024-05-06 PROCEDURE — 3074F SYST BP LT 130 MM HG: CPT | Mod: CPTII,S$GLB,, | Performed by: PEDIATRICS

## 2024-05-06 PROCEDURE — 1160F RVW MEDS BY RX/DR IN RCRD: CPT | Mod: CPTII,95,, | Performed by: NURSE PRACTITIONER

## 2024-05-06 PROCEDURE — 3072F LOW RISK FOR RETINOPATHY: CPT | Mod: CPTII,95,, | Performed by: NURSE PRACTITIONER

## 2024-05-06 PROCEDURE — 1159F MED LIST DOCD IN RCRD: CPT | Mod: CPTII,S$GLB,, | Performed by: PEDIATRICS

## 2024-05-06 PROCEDURE — 3072F LOW RISK FOR RETINOPATHY: CPT | Mod: CPTII,S$GLB,, | Performed by: PEDIATRICS

## 2024-05-06 PROCEDURE — 1160F RVW MEDS BY RX/DR IN RCRD: CPT | Mod: CPTII,S$GLB,, | Performed by: PEDIATRICS

## 2024-05-06 PROCEDURE — 3078F DIAST BP <80 MM HG: CPT | Mod: CPTII,S$GLB,, | Performed by: PEDIATRICS

## 2024-05-06 PROCEDURE — 99215 OFFICE O/P EST HI 40 MIN: CPT | Mod: 95,,, | Performed by: NURSE PRACTITIONER

## 2024-05-06 PROCEDURE — 1126F AMNT PAIN NOTED NONE PRSNT: CPT | Mod: CPTII,S$GLB,, | Performed by: PEDIATRICS

## 2024-05-06 PROCEDURE — 99214 OFFICE O/P EST MOD 30 MIN: CPT | Mod: S$GLB,,, | Performed by: PEDIATRICS

## 2024-05-06 PROCEDURE — 1157F ADVNC CARE PLAN IN RCRD: CPT | Mod: CPTII,S$GLB,, | Performed by: PEDIATRICS

## 2024-05-06 PROCEDURE — 1101F PT FALLS ASSESS-DOCD LE1/YR: CPT | Mod: CPTII,S$GLB,, | Performed by: PEDIATRICS

## 2024-05-06 PROCEDURE — 99999 PR PBB SHADOW E&M-EST. PATIENT-LVL V: CPT | Mod: PBBFAC,,, | Performed by: PEDIATRICS

## 2024-05-06 NOTE — PROGRESS NOTES
The patient location is: home  The chief complaint leading to consultation is: no complaints    Visit type: audiovisual    Face to Face time with patient: 35  45 minutes of total time spent on the encounter, which includes face to face time and non-face to face time preparing to see the patient (eg, review of tests), Obtaining and/or reviewing separately obtained history, Documenting clinical information in the electronic or other health record, Independently interpreting results (not separately reported) and communicating results to the patient/family/caregiver, or Care coordination (not separately reported).     Each patient to whom he or she provides medical services by telemedicine is:  (1) informed of the relationship between the physician and patient and the respective role of any other health care provider with respect to management of the patient; and (2) notified that he or she may decline to receive medical services by telemedicine and may withdraw from such care at any time.      Patient ID: Erendira Pretty is a 78 y.o. male.    Chief Complaint: no complaints    HPI:   77 yo male presents to the hematology clinic as a new patient for further evaluation and treatment of iron deficiency anemia.  He has been referred to the clinic by his pcp, Dr. Callaway.  He was seen many years ago in the clinic by Dr. Ramón Westbrook for SEBASTIAN and received IV iron at that time.  Is being followed by hepatology for h/o of hcv cured with cirrhosis and esophageal varices. Liver CA diagnosed x 2.   Has recently reported incident of melena which has resolved.     States that all of his anticoagulants and antiplatelets have been stopped.  Is only taking asa 81 mg PO daily.     States that he has been taking an oral iron supplement for about 15 days not.  He denies an GI upset.       Left leg numbness that started 2/26/2024 about 3 weeks after stopping antiplatelets and anticoagulation.  States it is ok in the morning but when he  starts to move around the leg becomes numb.     Family hx of cancer:  Brother: lung cancer  Brother #2: skin cancer  Self: HCC     Drink a few beers a day starting last week.  Denies cigarette smoking.  Denies illicit drug use.       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     Denies f/c/ns.  Had some unintentional weight loss from December till now - about 20 lbs before stent was placed - due to abdominal pain and diarrhea.       Denies any known abnormal lymphadenopathy.      Interval History:  2024  Found with SEBASTIAN due to GI blood loss and was given 2 doses of Feraheme with last dose received on 3/26/2024.  With continued anemia - hgb 9.6 mcv 89, saturated iron 5%.  States was hospitalized last  for melena x 1 episode.  EGD done 2024 - did not find H. Pylori or evidence of bleeding.  Currently being followed by GI.  [facility] has no episodes since  before last.  Only on baby asa now.     I have reviewed all of the patient's relevant lab work available in the medical record and have utilized this in my evaluation and management recommendations today.           Social History     Socioeconomic History    Marital status:      Spouse name: YAEL    Number of children: 2   Tobacco Use    Smoking status: Former     Current packs/day: 0.00     Average packs/day: 0.5 packs/day for 4.0 years (2.0 ttl pk-yrs)     Types: Cigarettes     Start date: 1968     Quit date: 1972     Years since quittin.9    Smokeless tobacco: Former     Types: Chew     Quit date: 1976   Substance and Sexual Activity    Alcohol use: Yes     Alcohol/week: 2.0 standard drinks of alcohol     Types: 2 Cans of beer per week    Drug use: No    Sexual activity: Yes     Partners: Female   Social History Narrative    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        Family History   Problem Relation Name Age of Onset    Heart disease Mother      Heart disease Father      Heart disease Brother      Colon cancer Neg Hx         Past Surgical History:   Procedure Laterality Date    ANGIOGRAM, EXTREMITY, UNILATERAL Left 1/4/2024    Procedure: ANGIOGRAM, EXTREMITY, UNILATERAL- Femoral / SMS Stent, Poss General;  Surgeon: ALEX Alfred III, MD;  Location: [address];  Service: Vascular;  Laterality: Left;  mGy : 2698.78  Gy.cm : 229.88  Contrast : 62ml  Fluro time : 13.8min    CARDIAC CATHETERIZATION      CARDIAC SURGERY      CARPAL TUNNEL RELEASE Left     CATARACT EXTRACTION      CATARACT EXTRACTION W/ INTRAOCULAR LENS  IMPLANT, BILATERAL  2008    CATHETERIZATION OF BOTH LEFT AND RIGHT HEART N/A 11/2/2018    Procedure: CATHETERIZATION, HEART, BOTH LEFT AND RIGHT;  Surgeon: Frank Gallardo MD;  Location: [facility] CATH LAB;  Service: Cardiology;  Laterality: N/A;    COLONOSCOPY  8/2013    COLONOSCOPY N/A 5/30/2016    Procedure: COLONOSCOPY;  Surgeon: Anna Tomas MD;  Location: [facility] ENDO;  Service: Endoscopy;  Laterality: N/A;    COLONOSCOPY N/A 7/17/2019    Procedure: COLONOSCOPY;  Surgeon: David Carter III, MD;  Location: [facility];  Service: Endoscopy;  Laterality: N/A;    COLONOSCOPY N/A 12/14/2023    Procedure: COLONOSCOPY;  Surgeon: Ama Barrera MD;  Location: [facility];  Service: Endoscopy;  Laterality: N/A;    COMPUTED TOMOGRAPHY N/A 9/9/2019    Procedure: CT (COMPUTED TOMOGRAPHY);  Surgeon: [facility] Diagnostic Provider;  Location: [facility] OR;  Service: General;  Laterality: N/A;  Microwave ablation to be done in CT.  Need CRNA and cart    COMPUTED TOMOGRAPHY N/A 1/25/2022    Procedure: Liver Microwave Ablation;  Surgeon: Red Puentes MD;  Location: [facility];  Service: General;  Laterality: N/A;    CORONARY ARTERY BYPASS GRAFT (CABG) N/A 11/5/2018    Procedure: CORONARY ARTERY BYPASS GRAFT (CABG);  Surgeon: Bear De Luna MD;  Location: [facility];   Service: Cardiothoracic;  Laterality: N/A;  TWO VESSEL BYPASS WITH AORTOTOMY AND EXCISION OF ATHEROSCLEROTIC PLAQUE    CORONARY BYPASS GRAFT ANGIOGRAPHY  3/2/2020    Procedure: Bypass graft study;  Surgeon: Cora Cormier MD;  Location: [facility] CATH LAB;  Service: Cardiology;;    ELBOW BURSA SURGERY Right 2010    ENDOSCOPIC HARVEST OF VEIN Left 11/5/2018    Procedure: SURGICAL PROCUREMENT, VEIN, ENDOSCOPIC;  Surgeon: Bear De Luna MD;  Location: [facility] OR;  Service: Cardiothoracic;  Laterality: Left;    ESOPHAGOGASTRODUODENOSCOPY N/A 7/17/2019    Procedure: ESOPHAGOGASTRODUODENOSCOPY (EGD);  Surgeon: David Carter III, MD;  Location: [facility] ENDO;  Service: Endoscopy;  Laterality: N/A;    ESOPHAGOGASTRODUODENOSCOPY N/A 12/29/2022    Procedure: ESOPHAGOGASTRODUODENOSCOPY (EGD);  Surgeon: Issa Gayle MD;  Location: [facility] ENDO;  Service: Endoscopy;  Laterality: N/A;    ESOPHAGOGASTRODUODENOSCOPY N/A 1/17/2024    Procedure: EGD (ESOPHAGOGASTRODUODENOSCOPY);  Surgeon: Issa Gayle MD;  Location: [facility] ENDO;  Service: Endoscopy;  Laterality: N/A;    ESOPHAGOGASTRODUODENOSCOPY N/A 4/30/2024    Procedure: EGD (ESOPHAGOGASTRODUODENOSCOPY);  Surgeon: Eder Foley MD;  Location: [facility] ENDO;  Service: Gastroenterology;  Laterality: N/A;    ETHMOIDECTOMY Bilateral 3/27/2019    Procedure: ETHMOIDECTOMY;  Surgeon: Alejandro Parkinson MD;  Location: [facility] OR;  Service: ENT;  Laterality: Bilateral;    EYE SURGERY      FOOT SURGERY      FRONTAL SINUS OBLITERATION Bilateral 3/27/2019    Procedure: SINUSOTOMY, FRONTAL SINUS, OBLITERATIVE;  Surgeon: Alejandro Parkinson MD;  Location: [facility];  Service: ENT;  Laterality: Bilateral;    FUNCTIONAL ENDOSCOPIC SINUS SURGERY (FESS) Bilateral 3/27/2019    Procedure: FESS (FUNCTIONAL ENDOSCOPIC SINUS SURGERY);  Surgeon: Alejandro Parkinson MD;  Location: [facility] OR;  Service: ENT;  Laterality: Bilateral;  Left Keila bullosa resection     INTRALUMINAL GASTROINTESTINAL TRACT IMAGING VIA CAPSULE N/A  2/2/2024    Procedure: IMAGING PROCEDURE, GI TRACT, INTRALUMINAL, VIA CAPSULE;  Surgeon: Cooper Estrella RN;  Location: [facility] ENDO;  Service: Endoscopy;  Laterality: N/A;    KNEE ARTHROSCOPY W/ MENISCAL REPAIR Left 06/2019    dr boykin    KNEE SURGERY Right     LEFT HEART CATHETERIZATION Left 3/2/2020    Procedure: CATHETERIZATION, HEART, LEFT;  Surgeon: Cora Cormier MD;  Location: [facility] CATH LAB;  Service: Cardiology;  Laterality: Left;    LIVER BIOPSY  01/2022    MAXILLARY ANTROSTOMY Bilateral 3/27/2019    Procedure: MAXILLARY ANTROSTOMY;  Surgeon: Alejandro Parkinson MD;  Location: [facility] OR;  Service: ENT;  Laterality: Bilateral;    NASAL SEPTOPLASTY N/A 3/27/2019    Procedure: SEPTOPLASTY, NOSE;  Surgeon: Alejandro Parkinson MD;  Location: [facility] OR;  Service: ENT;  Laterality: N/A;    RECTAL SURGERY  2012    STENT, SUPERIOR MESENTERIC ARTERY Left 1/4/2024    Procedure: STENT, SUPERIOR MESENTERIC ARTERY;  Surgeon: ALEX Alfred III, MD;  Location: [address];  Service: Vascular;  Laterality: Left;    TRANSURETHRAL RESECTION OF PROSTATE (TURP) WITHOUT USE OF LASER N/A 6/19/2018    Procedure: TURP, WITHOUT USING LASER;  Surgeon: Cooper Cordova IV, MD;  Location: [facility] OR;  Service: Urology;  Laterality: N/A;    TRIGGER FINGER RELEASE Left        Past Medical History:   Diagnosis Date    Anticoagulant long-term use     Aortic stenosis     Asthma     Benign prostatic hyperplasia with nocturia     Bilateral carotid artery stenosis 07/21/2021    US CAROTID BILATERAL 07/14/2021 IMPRESSION: Atherosclerosis with suspected stenosis greater than 50% at the right proximal ICA visually. Velocities are discordant and appear improved from prior. Atherosclerosis on the left with no evidence of flow-limiting stenosis greater than 50%.  FINDINGS: Right: Internal Carotid Artery (ICA): Peak systolic velocity 118 cm/sec. End diastolic velocity 35 cm/sec    BPH (benign prostatic hyperplasia)     CAD (coronary artery disease)     40% lesion  2002;[facility]    Cirrhosis     Claudication 04/09/2014    Colon polyp     COPD (chronic obstructive pulmonary disease)     ED (erectile dysfunction)     Encounter for blood transfusion     Ex-smoker     Hearing loss NEC     Hepatitis C     Cured;reji brown    Hyperlipidemia     Hypertension     Hypothyroid     s/p tx graves    Open angle with borderline findings and low glaucoma risk in both eyes 09/22/2020    DELONTE on CPAP     Osteoarthritis     Paroxysmal atrial fibrillation     PVD (peripheral vascular disease)     Secondary esophageal varices without bleeding 06/26/2017    Type 2 diabetes mellitus        Review of Systems   Constitutional: Negative.    HENT: Negative.     Eyes: Negative.    Respiratory: Negative.     Cardiovascular: Negative.    Gastrointestinal: Negative.    Endocrine: Negative.    Genitourinary: Negative.    Musculoskeletal: Negative.    Skin: Negative.    Allergic/Immunologic: Negative.    Neurological: Negative.    Hematological: Negative.    Psychiatric/Behavioral: Negative.            Medication List with Changes/Refills   Current Medications    ACCU-CHEK GRACE PLUS METER MISC    AS DIRECTED    ALBUTEROL (VENTOLIN HFA) 90 MCG/ACTUATION INHALER    Inhale 2 puffs into the lungs every 6 (six) hours as needed for Wheezing or Shortness of Breath. Rescue    ASPIRIN (ECOTRIN) 81 MG EC TABLET    Take 1 tablet (81 mg total) by mouth once daily.    BLOOD SUGAR DIAGNOSTIC (ACCU-CHEK GRACE PLUS TEST STRP) STRP    TEST THREE TIMES A DAY    BUDESONIDE-FORMOTEROL 160-4.5 MCG (SYMBICORT) 160-4.5 MCG/ACTUATION HFAA    INHALE 2 PUFFS INTO THE LUNGS TWO TIMES A DAY.    CARVEDILOL (COREG) 12.5 MG TABLET    Take 1 tablet (12.5 mg total) by mouth 2 (two) times daily with meals.    FAMOTIDINE (PEPCID) 40 MG TABLET    Take 40 mg by mouth.    FINASTERIDE (PROSCAR) 5 MG TABLET    TAKE 1 TABLET BY MOUTH once daily    FUROSEMIDE (LASIX) 40 MG TABLET    Take 1 tablet (40 mg total) by mouth 2 (two) times  "daily.    GLUCOSAMINE HCL/CHONDR ROSARIO A NA (OSTEO BI-FLEX ORAL)    Take 1 tablet by mouth 2 (two) times daily.     INSULIN (LANTUS SOLOSTAR U-100 INSULIN) GLARGINE 100 UNITS/ML SUBQ PEN    Inject 44 Units into the skin every evening.    JARDIANCE 25 MG TABLET    TAKE 1 TABLET BY MOUTH EVERY DAY    LANCETS (ACCU-CHEK FASTCLIX LANCET DRUM) MISC    TEST TWO TIMES A DAY    LEVOCETIRIZINE (XYZAL) 5 MG TABLET    Take 1 tablet (5 mg total) by mouth every evening.    LEVOTHYROXINE (SYNTHROID) 300 MCG TAB    TAKE 1 TABLET BY MOUTH EVERY MORNING    LEVOTHYROXINE (SYNTHROID) 50 MCG TABLET    Take 1 tablet (50 mcg total) by mouth before breakfast.    LISINOPRIL 10 MG TABLET    TAKE 1 BY MOUTH EVERY DAY    METFORMIN (GLUCOPHAGE-XR) 500 MG ER 24HR TABLET    Take 1 tablet (500 mg total) by mouth 2 (two) times daily with meals.    MIRABEGRON (MYRBETRIQ) 25 MG TB24 ER TABLET    Take 1 tablet (25 mg total) by mouth once daily.    MONTELUKAST (SINGULAIR) 10 MG TABLET    Take 1 tablet (10 mg total) by mouth once daily.    PEN NEEDLE, DIABETIC (BD ULTRA-FINE MINI PEN NEEDLE) 31 GAUGE X 3/16" NDLE    USE AS DIRECTED    PROPAFENONE (RHTHYMOL) 150 MG TAB    Take 1 tablet (150 mg total) by mouth every 8 (eight) hours.    RABEPRAZOLE (ACIPHEX) 20 MG TABLET    Take 2 tablets (40 mg total) by mouth 2 (two) times daily.    RANOLAZINE (RANEXA) 1,000 MG TB12    Take 1 tablet (1,000 mg total) by mouth 2 (two) times daily.    REPATHA SURECLICK 140 MG/ML PNIJ    Inject 1 mL into the skin every 14 (fourteen) days.    RIVAROXABAN (XARELTO) 20 MG TAB    Take 1 tablet (20 mg total) by mouth daily with dinner or evening meal.    SILDENAFIL (VIAGRA) 100 MG TABLET    Take 1/2 to 1 tablet by mouth one hour prior to intercourse. Max 100mg per day        Objective:   There were no vitals filed for this visit.    Physical Exam  Constitutional:       Appearance: Normal appearance.   Pulmonary:      Effort: Pulmonary effort is normal.   Neurological:      Mental " Status: He is alert and oriented to person, place, and time.   Psychiatric:         Mood and Affect: Mood normal.         Behavior: Behavior normal.         Thought Content: Thought content normal.         Judgment: Judgment normal.         Assessment:     Problem List Items Addressed This Visit          Oncology    Iron deficiency anemia due to chronic blood loss    Relevant Orders    CBC Auto Differential    Comprehensive Metabolic Panel    Ferritin    Iron and TIBC       GI    Melena - Primary    Relevant Orders    CBC Auto Differential    Comprehensive Metabolic Panel    Ferritin    Iron and TIBC     Other Visit Diagnoses       Normocytic anemia        Relevant Orders    CBC Auto Differential    Comprehensive Metabolic Panel    Ferritin    Iron and TIBC    Vitamin B12    Folate    Nutritional anemia, unspecified        Relevant Orders    Vitamin B12    Folate            Lab Results   Component Value Date    WBC 4.22 05/03/2024    RBC 3.53 (L) 05/03/2024    HGB 9.6 (L) 05/03/2024    HCT 31.5 (L) 05/03/2024    MCV 89 05/03/2024    MCH 27.2 05/03/2024    MCHC 30.5 (L) 05/03/2024    RDW 23.6 (H) 05/03/2024     05/03/2024    MPV 9.5 05/03/2024    GRAN 2.6 05/03/2024    GRAN 61.1 05/03/2024    LYMPH 1.1 05/03/2024    LYMPH 26.1 05/03/2024    MONO 0.4 05/03/2024    MONO 9.5 05/03/2024    EOS 0.1 05/03/2024    BASO 0.03 05/03/2024    EOSINOPHIL 2.4 05/03/2024    BASOPHIL 0.7 05/03/2024      Lab Results   Component Value Date     05/03/2024    K 4.3 05/03/2024     05/03/2024    CO2 24 05/03/2024    BUN 20 05/03/2024    CREATININE 1.4 05/03/2024    CALCIUM 9.2 05/03/2024    ANIONGAP 9 05/03/2024    ESTGFRAFRICA >60.0 06/28/2022    EGFRNONAA >60.0 06/28/2022     Lab Results   Component Value Date    ALT 13 05/03/2024    AST 20 05/03/2024     (H) 07/28/2008    ALKPHOS 60 05/03/2024    BILITOT 0.4 05/03/2024     Lab Results   Component Value Date    IRON 23 (L) 05/03/2024    TRANSFERRIN 285  05/03/2024    TIBC 422 05/03/2024    FESATURATED 5 (L) 05/03/2024    FERRITIN 23 05/03/2024        Plan:   Melena  -     CBC Auto Differential; Future; Expected date: 05/06/2024  -     Comprehensive Metabolic Panel; Future; Expected date: 05/06/2024  -     Ferritin; Future; Expected date: 05/06/2024  -     Iron and TIBC; Future; Expected date: 05/06/2024    Normocytic anemia  -     CBC Auto Differential; Future; Expected date: 05/06/2024  -     Comprehensive Metabolic Panel; Future; Expected date: 05/06/2024  -     Ferritin; Future; Expected date: 05/06/2024  -     Iron and TIBC; Future; Expected date: 05/06/2024  -     Vitamin B12; Future; Expected date: 05/06/2024  -     Folate; Future; Expected date: 05/06/2024    Iron deficiency anemia due to chronic blood loss  -     CBC Auto Differential; Future; Expected date: 05/06/2024  -     Comprehensive Metabolic Panel; Future; Expected date: 05/06/2024  -     Ferritin; Future; Expected date: 05/06/2024  -     Iron and TIBC; Future; Expected date: 05/06/2024    Nutritional anemia, unspecified  -     Vitamin B12; Future; Expected date: 05/06/2024  -     Folate; Future; Expected date: 05/06/2024    Other orders  -     ferumoxytoL (FERAHEME) 510 mg in dextrose 5 % (D5W) 117 mL IVPB      Med Onc Chart Routing      Follow up with physician    Follow up with VASILIY . F/u 6 weeks after last dose of Feraheme with labs prior - VV after   Infusion scheduling note   Feraheme infusions x 2 at first available location   Injection scheduling note n/a   Labs   Scheduling:  Preferred lab: [facility]  Lab interval:  cbc, cmp, iron studies, b12, folate   Imaging   N/a   Pharmacy appointment No pharmacy appointment needed      Other referrals       No additional referrals needed  n/a              Collaborating Provider:  Dr. Antonio Pineda    Thank You,  Ophelia Bustillos, FNP-C  Benign Hematology

## 2024-05-06 NOTE — PROGRESS NOTES
Subjective     Patient ID: Erendira Pretty is a 78 y.o. male.    Chief Complaint: Hospital Follow Up    Erendira Pretty is a 78 year old male here for a hospital follow up. On 04/30, pt had a recent EGD which showed gastritis and duodenitis. He reports at home no further melena. Off xarelto, only on aspirin. Has seen cards, plans are to have ablation in future with xarelto use just prior and after. LABS REVIEWED AND DISCUSSED WITH PATIENT. At this time, pt's anemia has improved since his EGD. Pt also complains of decreased hearing and pressure      Review of Systems   Constitutional:  Negative for chills and fever.   HENT:  Negative for nasal congestion and rhinorrhea.    Respiratory:  Negative for cough and shortness of breath.    Cardiovascular:  Negative for chest pain.   Gastrointestinal:  Negative for abdominal pain, blood in stool, change in bowel habit, constipation, diarrhea and nausea.   Endocrine: Negative for cold intolerance, heat intolerance, polydipsia, polyphagia and polyuria.   Genitourinary:  Negative for dysuria.   Neurological:  Negative for weakness.          Objective     Physical Exam  Constitutional:       General: He is not in acute distress.     Appearance: Normal appearance. He is normal weight. He is not ill-appearing, toxic-appearing or diaphoretic.   HENT:      Head: Atraumatic.   Neck:      Vascular: No carotid bruit.   Cardiovascular:      Rate and Rhythm: Normal rate and regular rhythm.      Pulses: Normal pulses.      Heart sounds: Normal heart sounds. No murmur heard.     Comments: Rate controlled, slight arrhythmia  Pulmonary:      Effort: Pulmonary effort is normal. No respiratory distress.      Breath sounds: Normal breath sounds. No stridor. No wheezing, rhonchi or rales.   Chest:      Chest wall: No tenderness.   Abdominal:      General: Abdomen is flat. Bowel sounds are normal. There is no distension.      Palpations: Abdomen is soft. There is no mass.      Tenderness: There is  no abdominal tenderness. There is no guarding.   Neurological:      General: No focal deficit present.      Mental Status: He is alert and oriented to person, place, and time. Mental status is at baseline.      Cranial Nerves: No cranial nerve deficit.      Sensory: No sensory deficit.      Motor: No weakness.      Gait: Gait normal.   Psychiatric:         Mood and Affect: Mood normal.         Behavior: Behavior normal.         Thought Content: Thought content normal.         Judgment: Judgment normal.            Assessment and Plan     1. Anticoagulant causing adverse effect in therapeutic use, sequela    2. Gastritis with hemorrhage, unspecified chronicity, unspecified gastritis type    3. Duodenitis    4. Paroxysmal atrial fibrillation    5. Type 2 diabetes mellitus with microalbuminuria, with long-term current use of insulin    6. Essential hypertension    7. Bilateral impacted cerumen  -     Ambulatory referral/consult to ENT; Future; Expected date: 05/13/2024        Stay off xarelto until immediate pre-ablation. Note from cardiology and EGD and path report reviewed with pt. Maintain high dose PPI, follow up with GI and hematology.    Transitional Care Note    Family and/or Caretaker present at visit?  No.  Diagnostic tests reviewed/disposition: No diagnosic tests pending after this hospitalization.  Disease/illness education: given  Home health/community services discussion/referrals: Patient does not have home health established from hospital visit.  They do not need home health.  If needed, we will set up home health for the patient.   Establishment or re-establishment of referral orders for community resources: No other necessary community resources.   Discussion with other health care providers: No discussion with other health care providers necessary.               No follow-ups on file.

## 2024-05-07 ENCOUNTER — PATIENT MESSAGE (OUTPATIENT)
Dept: ELECTROPHYSIOLOGY | Facility: CLINIC | Age: 79
End: 2024-05-07
Payer: MEDICARE

## 2024-05-08 ENCOUNTER — PATIENT MESSAGE (OUTPATIENT)
Dept: ELECTROPHYSIOLOGY | Facility: CLINIC | Age: 79
End: 2024-05-08
Payer: MEDICARE

## 2024-05-09 RX ORDER — LISINOPRIL 10 MG/1
10 TABLET ORAL
Qty: 90 TABLET | Refills: 3 | Status: SHIPPED | OUTPATIENT
Start: 2024-05-09

## 2024-05-09 NOTE — PROGRESS NOTES
Subjective:       Patient ID: Erendira Pretty is a 78 y.o. male.         Immunization History   Administered Date(s) Administered    COVID-19, MRNA, LN-S, PF (Pfizer) (Purple Cap) 2021, 2021, 2021    COVID-19, mRNA, LNP-S, PF (Moderna )Ages 12+ 2023    Hepatitis A 2003, 2004    Hepatitis A, Pediatric/Adolescent, 2 Dose 2003, 2004    Hepatitis B 2003, 2003, 2004, 2014, 2014    Hepatitis B, Adult 2014, 2014, 10/20/2014    Hepatitis B, Pediatric/Adolescent 2003, 2003, 2004    Influenza 2004, 10/17/2007, 10/20/2008, 10/07/2009, 10/20/2010    Influenza (FLUAD) - Trivalent - Adjuvanted - PF (65+) 2018, 2019    Influenza - High Dose - PF (65 years and older) 10/03/2011, 10/29/2013, 10/21/2014, 2015, 11/10/2016, 10/18/2017, 2018, 10/01/2020, 2021    Influenza - Quadrivalent - PF *Preferred* (6 months and older) 2004    Influenza - Trivalent (ADULT) 2004, 10/17/2007, 10/20/2008, 10/07/2009, 10/20/2010, 2012    Influenza A (H1N1) 2009 Monovalent - IM - PF 2009    Influenza Split 2012    Pneumococcal Conjugate - 13 Valent 2015    Pneumococcal Polysaccharide - 23 Valent 2014    Tdap 2014    Zoster 2012    Zoster Recombinant 2018, 2019, 2019     Social History     Tobacco Use   Smoking Status Former    Current packs/day: 0.00    Average packs/day: 0.5 packs/day for 4.0 years (2.0 ttl pk-yrs)    Types: Cigarettes    Start date: 1968    Quit date: 1972    Years since quittin.9   Smokeless Tobacco Former    Types: Chew    Quit date: 1976      Asthma Control Test  In the past 4  weeks, how much of the time did your asthma keep you from getting as much done at work, school or at home?: A little of the time  During the past 4 weeks, how often have you had shortness of breath?: Not at all  During the  past 4 weeks, how often did your asthma symptoms (wheezing, couging, shortness of breath, chest tightness or pain) wake you up at night or earlier than usual in the morning?: Not at all  During the past 4 weeks, how often have you used your rescue inhaler or nebulizer medication (such as albuterol)?: Not at all  How would you rate your asthma control during the past 4 weeks?: Well controlled  If your score is 19 or less, your asthma may not be under control: 23        5/10/2024     9:08 AM   EPWORTH SLEEPINESS SCALE   Sitting and reading 1   Watching TV 3   Sitting, inactive in a public place (e.g. a theatre or a meeting) 0   As a passenger in a car for an hour without a break 0   Lying down to rest in the afternoon when circumstances permit 0   Sitting and talking to someone 0   Sitting quietly after a lunch without alcohol 2   In a car, while stopped for a few minutes in traffic 0   Total score 6               MMRC Dyspnea Scale (4 is worst)     [x] MMRC 0: Dyspneic on strenuous excercise (0 points)    [] MMRC 1: Dyspneic on walking a slight hill (0 points)    [] MMRC 2: Dyspneic on walking level ground; must stop occasionally due to breathlessness (1 point)    [] MMRC 3: Must stop for breathlessness after walking 100 yards or after a few minutes (2 points)    [] MMRC 4: Cannot leave house; breathless on dressing/undressing (3 points)        Chief Complaint: Asthma    Mr Maria De Jesus Krishna is 76y.o.   DELONTE on CPAP, Chr wheezy asthmatic bronchitis, Chr nasal congestion  No interval exacerbations  immunizations current  Has no SOB, no cough, congestion  Lingering issue with leg muscle aches: vascular studies normal  LV 04/28/2021  Has new CPAP machine: Dream station 2  Here to review results  1. CPAP Download: CPAP 11 cm, Usage > 4 hrs was 100%  2. Spirometry was Normal  3. CXR Normal  Average AHI 2.5  Asthma score 25     Using nebuliser  Currently on SYMBICORT: refills sent.  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  CPAp 11 cm  Usage > 4 hrs was 3.8  Usage > 4 hrs was 92.2%  CXR reviewed  [place] 8      08/21/2023  Followup   Recent cough, congestion, green sputum  Recent burning  from neibhour  CPAP data reviewed  CPAP 11 cmwp  No fever  On SYMBICORT and Albuterol   Seen ENT recently   Occasional afternoon hoarseness  Rinses mouth     11/10/2023  Follow up  Last visit 08/21/2023  Cough, sinus fullness, drainage  Green  sometimes bloody discharge  No fever  Similar illness last year    FeNo 28  Adherent with CPAP: missed 4 days but adherent  Spirometry Normal    05/10/2024   Followup  Last visit 11/10/2023  No recent exacebations  Recent hospital admissions: GIB  A fib on Xarelto  No cough, No wheezing, No SOB  Asks for new CPAP  Current heater not working        Asthma  There is no cough, shortness of breath, sputum production or wheezing. Pertinent negatives include no headaches, orthopnea or postnasal drip. His past medical history is significant for asthma.     '[facility]  Discharge Summary        Patient Name: Erendira Pretty  MRN: [id number]  LLOYD: [id number]  Patient Class: OP- Observation  Admission Date: 2/23/2024  Hospital Length of Stay: 0 days  Discharge Date and Time: 2/24/24  Attending Physician: Sepideh Carreon,*   Discharging Provider: Sepideh Carreon MD  Primary Care Provider: Bhupinder Callaway MD     Primary Care Team: [facility]     HPI:   78 y.o. male with a PMHx of open heart surgery, CAD, Afib, DM, anemia, HTN, and HLD. Presented to the ED for chest tightness and left leg numbness which onset this AM. He states he was pulling up weeds in his garden when he began feeling chest tightness that went away after a period of rest. It returned when he walked to the ED from his vehicle. His left leg numbness onset  "about 1 hour Prior to assessment while he was in the waiting room. He had similar sxs yesterday but not as severe. He reports that he feels exactly how he felt before his open heart surgery. Associated sxs include SOB and CP with exertion. Pt denies any fever, diaphoresis, cough, dysuria, hematochezia, N/V/D, or left leg weakness. He sees Dr. Gallardo (Cardiology). He states that since he has been off blood thinners he has not had any blood in his stool, per review of chart, has been off anti-coagulation since 1/16/24. S/P SMA stent  1/4/24. Per report of ED physician, neurology does not recommend TPA at this time. In the ED, vitals: 177/91, 84, 18, 98.8, 100% RA. Significant Labs" hgb: 8.2, BNP: 283, CT Head: No acute finding. Recommended for MRI/MRA. Patient is a full code. Placed in observation under the care of Hospital Medicine for management of Chest Pain, CVA Ruleout.     * No surgery found *       Hospital Course:   78 y.o. male with a PMHx of open heart surgery, CAD, Afib, DM, anemia, HTN, and HLD. Presented to the ED for chest tightness( during exertion) and left leg numbness on 2/23/24; His left leg numbness onset about 1 hour Prior to assessment while he was in the waiting room.  Per report of ED physician, neurology does not recommend TPA at this time., CT Head: No acute finding. Recommended for MRI/MRA. Patient is a full code. Placed in observation under the care of Hospital Medicine for management of Chest Pain, CVA Ruleout.   He sees Dr. Gallardo (Cardiology). S/P SMA stent  1/4/24.  However noted to have 3 episodes of GI bleed, per review of chart, has been off anti-coagulation since 1/16/24;    An EGD (01/17/24) showed grade I esophageal varices without bleeding and gastritis. Today he denies nausea, vomiting, abdominal pain, change in appetite or hematochezia.   colonoscopy (12/2023): colon polyps and ischemic ulcers which led to the recent SMA stenting.       As of 02/24/2024, examination of " patient denied bedside, appeared alert and oriented x3, denied acute issues overnight.  Denied headache, dizziness, chest pain, shortness O breath, nausea, vomiting, further lower extremity numbness.  Able to ambulate without issues.    CT head negative, MRI brain did not show acute findings; MRA brain did not show acute findings;   US carotids- 50-69% stenosis on the right and less than 50% stenosis on the left progressed from the prior exam;  Cardiology evaluated and recommended Ranexa, increase dose of Coreg for angina/coronary artery disease.  Recommended to reconsider starting aspirin 81 mg q.day to see if patient can tolerate, if patient agrees.    Given recent history of anemia/GI bleeding issues, stated not a candidate for left heart catheterization as of now.  Recommended close follow up with patient's primary cardiologist Dr. Gallardo after discharge to assess coronary artery disease/angina and patient's ability to tolerate aspirin if patient opts to restart.  Had stress test MPI in August 2023 which was negative.    Echo showed EF of 60-65%, moderate-to-severe aortic stenosis, cardiologist stated patient would eventually need AVR, for which also needs close outpatient follow up with p.r.n. cardiologist.    Given underlying history of paroxysmal atrial fibrillation, current episode with concerns for possible TIA, inability to consider anticoagulants given GI bleed, recommended outpatient follow up with EP/Dr. Huerta after discharge to evaluate for PAF and possible Watchman device.    Has been evaluated by neurologist, initially recommended to consider Pradaxa if patient has already been on anticoagulants/Xarelto, however given history of GI bleed, recommended outpatient follow up with Cardiology/GI to initiate blood thinners accordingly, also to consider LAAO/Watchman for AFib.  Deemed okay for discharge from Cardiology/Neurology standpoint;  PT OT evaluated and no recommendations as of now;     Discussed  "with patient in detail about Cardiology/Neurology recommendations, patient stated having upcoming appointment with Dr. Gallardo, we will consider initiation of aspirin as outpatient likely after discussing with his primary cardiologist.  Also emphasized on outpatient follow up with EP to address paroxysmal AFib.  Considering clinical and hemodynamic stability, planning to discharge patient today, emphasized on compliance with medications, outpatient follow up with PCP/Cardiology/EP/GI within 1-2 weeks upon discharge, patient expressed understanding, agreed with the plan  Discharge accordingly today                  Review of Systems   Constitutional:  Negative for fatigue.   HENT:  Negative for postnasal drip, sinus pressure, voice change and congestion.    Eyes: Negative.    Respiratory:  Negative for snoring, cough, sputum production, shortness of breath, wheezing, orthopnea, asthma nighttime symptoms, pleurisy, dyspnea on extertion, use of rescue inhaler, somnolence and Paroxysmal Nocturnal Dyspnea.    Cardiovascular:  Negative for leg swelling.   Genitourinary: Negative.    Endocrine: endocrine negative    Musculoskeletal: Negative.    Skin: Negative.    Gastrointestinal: Negative.    Neurological:  Negative for headaches.   Psychiatric/Behavioral: Negative.     All other systems reviewed and are negative.      Objective:       Vitals:    05/10/24 0906   BP: 112/74   Pulse: 76   Resp: 17   SpO2: 95%   Weight: 100.9 kg (222 lb 7.1 oz)   Height: 5' 7" (1.702 m)             Physical Exam   Constitutional: He is oriented to person, place, and time. He appears well-developed and well-nourished. He is obese.   HENT:   Head: Normocephalic.   Nose: No mucosal edema.   Mouth/Throat: Oropharynx is clear and moist. Mallampati Score: III.   Cardiovascular: Normal rate, regular rhythm and intact distal pulses.   No murmur heard.  Pulmonary/Chest: Normal expansion, symmetric chest wall expansion, effort normal and breath " sounds normal. He has no decreased breath sounds. He has no rhonchi. He has no rales.   Abdominal: Soft. Bowel sounds are normal.   Musculoskeletal:         General: No edema. Normal range of motion.      Cervical back: Normal range of motion and neck supple.   Lymphadenopathy:     He has no cervical adenopathy.   Neurological: He is alert and oriented to person, place, and time. He has normal reflexes.   Skin: Skin is warm and dry.   Psychiatric: He has a normal mood and affect.   Nursing note and vitals reviewed.    Personal Diagnostic Review        Personal Diagnostic Review  [x]  DOWNLOAD   Karmen LaughlinOSKAR [employer]. [address] [place] [id number] [phone]  [place] [address] [place] [id number]  Compliance Information 10/10/2023 - 11/8/2023  Compliance Summary  10/10/2023 - 11/8/2023 (30 days)  Days with Device Usage 26 days  Days without Device Usage 4 days  Percent Days with Device Usage 86.7%  Cumulative Usage 9 days 17 hrs. 46 secs.  Maximum Usage (1 Day) 10 hrs. 39 mins. 14 secs.  Average Usage (All Days) 7 hrs. 46 mins. 1 secs.  Average Usage (Days Used) 8 hrs. 57 mins. 43 secs.  Minimum Usage (1 Day) 6 hrs. 48 mins. 7 secs.  Percent of Days with Usage >= 4 Hours 86.7%  Percent of Days with Usage < 4 Hours 13.3%  Date Range  Total Blower Time 9 days 20 hrs. 54 mins. 53 secs.  CPAP Summary  Average Time in Large Leak Per Day 1 mins. 9 secs.  Average AHI 3.3  CPAP 11.0 cmH2O      [x]  SPIROMETRY      FEV1 2.51 L( 94.2%), FVC 3.42L( 96.2%)  FEV1/FVC 73    FENO was12        X-Ray Chest PA And Lateral  Narrative: EXAM:  XR CHEST PA AND LATERAL    CLINICAL HISTORY: [J45.20]-Mild intermittent asthma, uncomplicated.    COMPARISON: 05/08/2023    FINDINGS: There are surgical changes associated with a prior sternotomy.  The size and contour of the heart are normal.  There is no evidence of an acute pulmonary process.  There is no pneumothorax or  "pleural effusion.  Impression:  There is no evidence of an acute pulmonary process.    Finalized on: 5/10/2024 8:17 AM By:  Amaury Herrmann MD  R# [id number]      2024-05-10 08:19:[id number]    [facility]            5/10/2024     9:06 AM 5/6/2024     8:21 AM 5/1/2024     1:41 PM 5/1/2024     8:22 AM 4/30/2024     9:54 AM 4/30/2024     9:46 AM 4/30/2024     9:17 AM   Pulmonary Function Tests   SpO2 95 %  97 %  98 % 99 % 99 %   Height 5' 7" (1.702 m) 5' 7" (1.702 m) 5' 7" (1.702 m) 5' 7" (1.702 m)      Weight 100.9 kg (222 lb 7.1 oz) 101.6 kg (223 lb 15.8 oz) 101.7 kg (224 lb 3.3 oz) 100.8 kg (222 lb 3.6 oz)      BMI (Calculated) 34.8 35.1 35.1 34.8            Assessment:       Problem List Items Addressed This Visit       Acquired hypothyroidism (Chronic)    Essential hypertension (Chronic)    Type 2 diabetes mellitus with microalbuminuria, with long-term current use of insulin (Chronic)    DELONTE on CPAP (Chronic)         5/10/2024     9:08 AM   EPWORTH SLEEPINESS SCALE   Sitting and reading 1   Watching TV 3   Sitting, inactive in a public place (e.g. a theatre or a meeting) 0   As a passenger in a car for an hour without a break 0   Lying down to rest in the afternoon when circumstances permit 0   Sitting and talking to someone 0   Sitting quietly after a lunch without alcohol 2   In a car, while stopped for a few minutes in traffic 0   Total score 6        CPAP 11 cm  Attest using    Says device not working    New orders         Relevant Orders    CPAP FOR HOME USE    Aortic atherosclerosis    BMI 36.0-36.9,adult    Mixed hyperlipidemia    Mild intermittent asthma without complication - Primary     Asthma Control Test  In the past 4  weeks, how much of the time did your asthma keep you from getting as much done at work, school or at home?: A little of the time  During the past 4 weeks, how often have you had shortness of breath?: Not at all  During the past 4 weeks, how often did your asthma symptoms (wheezing, couging, " shortness of breath, chest tightness or pain) wake you up at night or earlier than usual in the morning?: Not at all  During the past 4 weeks, how often have you used your rescue inhaler or nebulizer medication (such as albuterol)?: Not at all  How would you rate your asthma control during the past 4 weeks?: Well controlled  If your score is 19 or less, your asthma may not be under control: 23      Current immuninsations  Needs RSV  FeNo was 12    Adherent with meds    FEV1 and FVC Normal: improved    Requested Prescriptions     Pending Prescriptions Disp Refills    albuterol (VENTOLIN HFA) 90 mcg/actuation inhaler 18 g 3     Sig: Inhale 2 puffs into the lungs every 6 (six) hours as needed for Wheezing or Shortness of Breath. Rescue    budesonide-formoterol 160-4.5 mcg (SYMBICORT) 160-4.5 mcg/actuation HFAA 10.2 g 11     Sig: INHALE 2 PUFFS INTO THE LUNGS TWO TIMES A DAY.              Relevant Medications    albuterol (VENTOLIN HFA) 90 mcg/actuation inhaler    budesonide-formoterol 160-4.5 mcg (SYMBICORT) 160-4.5 mcg/actuation HFAA    Other Relevant Orders    X-Ray Chest PA And Lateral    Fraction of  Nitric Oxide    Spirometry without Bronchodilator    RSVPreF Recombinant (Arexvy)        Plan:          Requested Prescriptions     Signed Prescriptions Disp Refills    albuterol (VENTOLIN HFA) 90 mcg/actuation inhaler 18 g 3     Sig: Inhale 2 puffs into the lungs every 6 (six) hours as needed for Wheezing or Shortness of Breath. Rescue    budesonide-formoterol 160-4.5 mcg (SYMBICORT) 160-4.5 mcg/actuation HFAA 10.2 g 11     Sig: INHALE 2 PUFFS INTO THE LUNGS TWO TIMES A DAY.     Orders Placed This Encounter   Procedures    CPAP FOR HOME USE     Current device malfunction     Order Specific Question:   Length of need (1-99 months):     Answer:   99     Order Specific Question:   Fulfillment Priority:     Answer:   Level 2:   AHI 30  to < 59 associated with systolic congestive heart failure, moderate-severe COPD,  moderate-severe neuromuscular disease, HTN consistently > 140/90 despite at least 2 anti-hypertensives that patient is compliant with, atrial fibrillation     Order Specific Question:   Type ():     Answer:   CPAP     Order Specific Question:   CPAP setting (cmH20):     Answer:   11     Order Specific Question:   Humidification ():     Answer:   Heated     Order Specific Question:   Choose ONE mask type and its corresponding cushions and/or pillows:     Answer:    Nasal Mask, 1 per 90 days:  Nasal Cushions, (6 per 90 days):  Nasal Pillows, (6 per 90 days)     Order Specific Question:   Choose EITHER Heated or Non-Heated Tubjing     Answer:    Non-Heated Tubing, 1 per 90 days     Order Specific Question:   All other supplies as needed as listed below:     Answer:    Headgear, 1 per 180 days     Order Specific Question:   All other supplies as needed as listed below:     Answer:    Chin Strap, 1 per 180 days     Order Specific Question:   All other supplies as needed as listed below:     Answer:    Non-Disposable Filter, 1 per 180 days     Order Specific Question:   All other supplies as needed as listed below:     Answer:    Disposable Filter, 6 per 90 days     Order Specific Question:   All other supplies as needed as listed below:     Answer:    Exhalation Port, contact payer for quantity/frequency     Order Specific Question:   All other supplies as needed as listed below:     Answer:    Humidifier Chamber, 1 per 180 days     Order Specific Question:   DME Agency:     Answer:   [facility]    X-Ray Chest PA And Lateral     Standing Status:   Future     Standing Expiration Date:   5/10/2025    RSVPreF Recombinant (Arexvy)    Fraction of  Nitric Oxide     Standing Status:   Future     Standing Expiration Date:   5/10/2025     Order Specific Question:   Release to patient     Answer:   Immediate    Spirometry without Bronchodilator      Standing Status:   Future     Standing Expiration Date:   5/10/2025     Order Specific Question:   Release to patient     Answer:   Immediate     Has done well without any overall respiratory issues other than his GI issues   We will reach out to us if he needs any assistance for any procedures   No pulmonary contraindications for any procedures anticipated.    Follow up in about 1 year (around 5/10/2025), or cxr, terry, feno, CPAP orders, Download CPAP, weight loss, needs RSV.    This note was prepared using voice recognition system and is likely to have sound alike errors that may have been overlooked even after proof reading.  Please call me with any questions    Discussed diagnosis, its evaluation, treatment and usual course. All questions answered.    Thank you for the courtesy of participating in the care of this patient    Rishi Molina MD

## 2024-05-10 ENCOUNTER — CLINICAL SUPPORT (OUTPATIENT)
Dept: PULMONOLOGY | Facility: CLINIC | Age: 79
End: 2024-05-10
Attending: INTERNAL MEDICINE
Payer: MEDICARE

## 2024-05-10 ENCOUNTER — HOSPITAL ENCOUNTER (OUTPATIENT)
Dept: RADIOLOGY | Facility: HOSPITAL | Age: 79
Discharge: HOME | End: 2024-05-10
Attending: INTERNAL MEDICINE
Payer: MEDICARE

## 2024-05-10 VITALS
DIASTOLIC BLOOD PRESSURE: 74 MMHG | HEIGHT: 67 IN | WEIGHT: 222.44 LBS | OXYGEN SATURATION: 95 % | SYSTOLIC BLOOD PRESSURE: 112 MMHG | HEART RATE: 76 BPM | RESPIRATION RATE: 17 BRPM | BODY MASS INDEX: 34.91 KG/M2

## 2024-05-10 DIAGNOSIS — E11.29 TYPE 2 DIABETES MELLITUS WITH MICROALBUMINURIA, WITH LONG-TERM CURRENT USE OF INSULIN: Chronic | ICD-10-CM

## 2024-05-10 DIAGNOSIS — J45.20 MILD INTERMITTENT ASTHMA WITHOUT COMPLICATION: ICD-10-CM

## 2024-05-10 DIAGNOSIS — J45.20 MILD INTERMITTENT ASTHMA WITHOUT COMPLICATION: Primary | ICD-10-CM

## 2024-05-10 DIAGNOSIS — G47.33 OSA ON CPAP: Chronic | ICD-10-CM

## 2024-05-10 DIAGNOSIS — I70.0 AORTIC ATHEROSCLEROSIS: ICD-10-CM

## 2024-05-10 DIAGNOSIS — E03.9 ACQUIRED HYPOTHYROIDISM: Chronic | ICD-10-CM

## 2024-05-10 DIAGNOSIS — Z79.4 TYPE 2 DIABETES MELLITUS WITH MICROALBUMINURIA, WITH LONG-TERM CURRENT USE OF INSULIN: Chronic | ICD-10-CM

## 2024-05-10 DIAGNOSIS — E78.2 MIXED HYPERLIPIDEMIA: ICD-10-CM

## 2024-05-10 DIAGNOSIS — I10 ESSENTIAL HYPERTENSION: Chronic | ICD-10-CM

## 2024-05-10 DIAGNOSIS — R80.9 TYPE 2 DIABETES MELLITUS WITH MICROALBUMINURIA, WITH LONG-TERM CURRENT USE OF INSULIN: Chronic | ICD-10-CM

## 2024-05-10 LAB
BRPFT: ABNORMAL
FEF 25 75 LLN: 0.76
FEF 25 75 PRE REF: 87 %
FEF 25 75 REF: 1.95
FEV1 FVC LLN: 61
FEV1 FVC PRE REF: 97.2 %
FEV1 FVC REF: 75
FEV1 LLN: 1.86
FEV1 PRE REF: 94.2 %
FEV1 REF: 2.66
FVC LLN: 2.58
FVC PRE REF: 96.2 %
FVC REF: 3.56
PEF LLN: 4.8
PEF PRE REF: 146.4 %
PEF REF: 6.92
PRE FEF 25 75: 1.7 L/S (ref 0.76–3.14)
PRE FET 100: 12.85 SEC
PRE FEV1 FVC: 73.32 % (ref 60.86–90.03)
PRE FEV1: 2.51 L (ref 1.86–3.47)
PRE FVC: 3.42 L (ref 2.58–4.53)
PRE PEF: 10.13 L/S (ref 4.8–9.04)

## 2024-05-10 PROCEDURE — 99214 OFFICE O/P EST MOD 30 MIN: CPT | Mod: 25,S$GLB,, | Performed by: INTERNAL MEDICINE

## 2024-05-10 PROCEDURE — 1160F RVW MEDS BY RX/DR IN RCRD: CPT | Mod: CPTII,S$GLB,, | Performed by: INTERNAL MEDICINE

## 2024-05-10 PROCEDURE — 71046 X-RAY EXAM CHEST 2 VIEWS: CPT | Mod: TC

## 2024-05-10 PROCEDURE — 3074F SYST BP LT 130 MM HG: CPT | Mod: CPTII,S$GLB,, | Performed by: INTERNAL MEDICINE

## 2024-05-10 PROCEDURE — 1157F ADVNC CARE PLAN IN RCRD: CPT | Mod: CPTII,S$GLB,, | Performed by: INTERNAL MEDICINE

## 2024-05-10 PROCEDURE — 95012 NITRIC OXIDE EXP GAS DETER: CPT | Mod: S$GLB,,, | Performed by: INTERNAL MEDICINE

## 2024-05-10 PROCEDURE — 1101F PT FALLS ASSESS-DOCD LE1/YR: CPT | Mod: CPTII,S$GLB,, | Performed by: INTERNAL MEDICINE

## 2024-05-10 PROCEDURE — 3288F FALL RISK ASSESSMENT DOCD: CPT | Mod: CPTII,S$GLB,, | Performed by: INTERNAL MEDICINE

## 2024-05-10 PROCEDURE — 3078F DIAST BP <80 MM HG: CPT | Mod: CPTII,S$GLB,, | Performed by: INTERNAL MEDICINE

## 2024-05-10 PROCEDURE — 99999 PR PBB SHADOW E&M-EST. PATIENT-LVL V: CPT | Mod: PBBFAC,,, | Performed by: INTERNAL MEDICINE

## 2024-05-10 PROCEDURE — 1159F MED LIST DOCD IN RCRD: CPT | Mod: CPTII,S$GLB,, | Performed by: INTERNAL MEDICINE

## 2024-05-10 PROCEDURE — 71046 X-RAY EXAM CHEST 2 VIEWS: CPT | Mod: 26,,, | Performed by: RADIOLOGY

## 2024-05-10 PROCEDURE — 3072F LOW RISK FOR RETINOPATHY: CPT | Mod: CPTII,S$GLB,, | Performed by: INTERNAL MEDICINE

## 2024-05-10 PROCEDURE — 94010 BREATHING CAPACITY TEST: CPT | Mod: S$GLB,,, | Performed by: INTERNAL MEDICINE

## 2024-05-10 RX ORDER — LEVOTHYROXINE SODIUM 300 UG/1
300 TABLET ORAL EVERY MORNING
Qty: 90 TABLET | Refills: 3 | Status: SHIPPED | OUTPATIENT
Start: 2024-05-10

## 2024-05-10 RX ORDER — BUDESONIDE AND FORMOTEROL FUMARATE DIHYDRATE 160; 4.5 UG/1; UG/1
AEROSOL RESPIRATORY (INHALATION)
Qty: 10.2 G | Refills: 11 | Status: SHIPPED | OUTPATIENT
Start: 2024-05-10

## 2024-05-10 RX ORDER — ALBUTEROL SULFATE 90 UG/1
2 AEROSOL, METERED RESPIRATORY (INHALATION) EVERY 6 HOURS PRN
Qty: 18 G | Refills: 3 | Status: SHIPPED | OUTPATIENT
Start: 2024-05-10 | End: 2024-06-13

## 2024-05-10 NOTE — TELEPHONE ENCOUNTER
No care due was identified.  [facility] Embedded Care Due Messages. Reference number: [id number].   5/10/2024 8:51:33 AM CDT

## 2024-05-10 NOTE — ASSESSMENT & PLAN NOTE
Asthma Control Test  In the past 4  weeks, how much of the time did your asthma keep you from getting as much done at work, school or at home?: A little of the time  During the past 4 weeks, how often have you had shortness of breath?: Not at all  During the past 4 weeks, how often did your asthma symptoms (wheezing, couging, shortness of breath, chest tightness or pain) wake you up at night or earlier than usual in the morning?: Not at all  During the past 4 weeks, how often have you used your rescue inhaler or nebulizer medication (such as albuterol)?: Not at all  How would you rate your asthma control during the past 4 weeks?: Well controlled  If your score is 19 or less, your asthma may not be under control: 23      Current immuninsations  Needs RSV  FeNo was 12    Adherent with meds    FEV1 and FVC Normal: improved    Requested Prescriptions     Pending Prescriptions Disp Refills    albuterol (VENTOLIN HFA) 90 mcg/actuation inhaler 18 g 3     Sig: Inhale 2 puffs into the lungs every 6 (six) hours as needed for Wheezing or Shortness of Breath. Rescue    budesonide-formoterol 160-4.5 mcg (SYMBICORT) 160-4.5 mcg/actuation HFAA 10.2 g 11     Sig: INHALE 2 PUFFS INTO THE LUNGS TWO TIMES A DAY.

## 2024-05-10 NOTE — PROCEDURES
Clinical Guide to Interpretation or FeNO Levels :    FeNO  (ppb) LOW INTERMEDIATE HIGH   ADULT VALUES < 25 25-50          > 50   Th2-driven Inflammation Unlikely Likely Significant     Patients FeNO level at this visit : ___12_ (ppb)    Interpretation of FeNO measurement in adults:  [x] FENO is less than 25 ppb implies non eosinophilic airway inflammation or the absence of airway inflammation.    Comment: Low FENO (<25 ppb) in adult asthmatics with persistent symptoms suggests other etiologies for these symptoms and a lower likelihood of benefit from adding or increasing inhaled glucocorticoids.    [] FENO between 25 and 50 ppb in adults should be interpreted cautiously with reference to the clinical situation (eg, symptomatic, on or off therapy, current smoking).    [] FENO greater than 50 ppb in adults  suggests eosinophilic airway inflammation   Comment: High FENO (>50 ppb) in adult asthmatics even with atypical symptoms suggests glucocorticoid responsiveness. High FENO (>50 ppb) can help identify poor adherence or uncontrolled inflammation in asthma patients with otherwise seemingly controlled asthma.    Discussion:  A FENO less than 25 ppb in adults and less than 20 ppb in children younger than 12 years of age implies noneosinophilic airway inflammation or the absence of airway inflammation.  A FENO greater than 50 ppb in adults or greater than 35 ppb in children suggests eosinophilic airway inflammation.   Values of FENO between 25 and 50 ppb in adults (20 to 35 ppb in children) should be interpreted cautiously with reference to the clinical situation (eg, symptomatic, on or off therapy, current smoking).  A rising FENO with a greater than 20 percent change and more than 25 ppb (20 ppb in children) from a previously stable level suggests increasing eosinophilic airway inflammation, but there are wide inter-individual differences.  A decrease in FENO greater than 20 percent for values over 50 ppb or more than  10 ppb for values less than 50 ppb may be clinically important.  ?FENO in other respiratory diseases - Several other diseases are associated with altered levels of exhaled NO: low levels of FENO have been noted in cystic fibrosis, current smoking, pulmonary hypertension, hypothermia, primary ciliary dyskinesia, and bronchopulmonary dysplasia. Elevated FENO has been noted in atopy, nonasthmatic eosinophilic bronchitis, COPD exacerbations, noncystic fibrosis bronchiectasis, and viral upper respiratory infections.    REFERENCE:  ATS Board of Directors, December 2004, and by the ERS Executive Committee, June 2004. ATS/ERS Recommendations for Standardized Procedures for the Online and Offline Measurement of Exhaled Lower Respiratory Nitric Oxide and Nasal Nitric Oxide. Guideline 2005

## 2024-05-10 NOTE — ASSESSMENT & PLAN NOTE
5/10/2024     9:08 AM   EPWORTH SLEEPINESS SCALE   Sitting and reading 1   Watching TV 3   Sitting, inactive in a public place (e.g. a theatre or a meeting) 0   As a passenger in a car for an hour without a break 0   Lying down to rest in the afternoon when circumstances permit 0   Sitting and talking to someone 0   Sitting quietly after a lunch without alcohol 2   In a car, while stopped for a few minutes in traffic 0   Total score 6        CPAP 11 cm  Data for [id number]   Usage greater than 4 hours was 93.3%.    CPAP 11 cm water pressure   Residual AHI 3.7.    Patient using and benefitting from CPAP.    Says device not working    New orders

## 2024-05-11 ENCOUNTER — HOSPITAL ENCOUNTER (EMERGENCY)
Facility: HOSPITAL | Age: 79
Discharge: HOME | End: 2024-05-11
Attending: EMERGENCY MEDICINE
Payer: MEDICARE

## 2024-05-11 ENCOUNTER — TELEPHONE (OUTPATIENT)
Dept: CARDIOLOGY | Facility: HOSPITAL | Age: 79
End: 2024-05-11
Payer: MEDICARE

## 2024-05-11 VITALS
SYSTOLIC BLOOD PRESSURE: 130 MMHG | DIASTOLIC BLOOD PRESSURE: 60 MMHG | BODY MASS INDEX: 34.74 KG/M2 | HEART RATE: 72 BPM | RESPIRATION RATE: 18 BRPM | TEMPERATURE: 98 F | OXYGEN SATURATION: 99 % | WEIGHT: 221.81 LBS

## 2024-05-11 DIAGNOSIS — R06.02 SOB (SHORTNESS OF BREATH): ICD-10-CM

## 2024-05-11 DIAGNOSIS — R07.9 CHEST PAIN, UNSPECIFIED TYPE: Primary | ICD-10-CM

## 2024-05-11 LAB
ALBUMIN SERPL BCP-MCNC: 3.7 G/DL (ref 3.5–5.2)
ALP SERPL-CCNC: 62 U/L (ref 55–135)
ALT SERPL W/O P-5'-P-CCNC: 12 U/L (ref 10–44)
ANION GAP SERPL CALC-SCNC: 8 MMOL/L (ref 8–16)
AST SERPL-CCNC: 18 U/L (ref 10–40)
BASOPHILS # BLD AUTO: 0.04 K/UL (ref 0–0.2)
BASOPHILS NFR BLD: 0.9 % (ref 0–1.9)
BILIRUB SERPL-MCNC: 0.3 MG/DL (ref 0.1–1)
BNP SERPL-MCNC: 299 PG/ML (ref 0–99)
BUN SERPL-MCNC: 20 MG/DL (ref 8–23)
CALCIUM SERPL-MCNC: 8.8 MG/DL (ref 8.7–10.5)
CHLORIDE SERPL-SCNC: 105 MMOL/L (ref 95–110)
CO2 SERPL-SCNC: 27 MMOL/L (ref 23–29)
CREAT SERPL-MCNC: 1.2 MG/DL (ref 0.5–1.4)
DIFFERENTIAL METHOD BLD: ABNORMAL
EOSINOPHIL # BLD AUTO: 0.1 K/UL (ref 0–0.5)
EOSINOPHIL NFR BLD: 2.6 % (ref 0–8)
ERYTHROCYTE [DISTWIDTH] IN BLOOD BY AUTOMATED COUNT: 22.4 % (ref 11.5–14.5)
EST. GFR  (NO RACE VARIABLE): >60 ML/MIN/1.73 M^2
GLUCOSE SERPL-MCNC: 110 MG/DL (ref 70–110)
HCT VFR BLD AUTO: 30.2 % (ref 40–54)
HGB BLD-MCNC: 9.5 G/DL (ref 14–18)
IMM GRANULOCYTES # BLD AUTO: 0.02 K/UL (ref 0–0.04)
IMM GRANULOCYTES NFR BLD AUTO: 0.4 % (ref 0–0.5)
LYMPHOCYTES # BLD AUTO: 0.9 K/UL (ref 1–4.8)
LYMPHOCYTES NFR BLD: 20.2 % (ref 18–48)
MCH RBC QN AUTO: 26.5 PG (ref 27–31)
MCHC RBC AUTO-ENTMCNC: 31.5 G/DL (ref 32–36)
MCV RBC AUTO: 84 FL (ref 82–98)
MONOCYTES # BLD AUTO: 0.6 K/UL (ref 0.3–1)
MONOCYTES NFR BLD: 12.6 % (ref 4–15)
NEUTROPHILS # BLD AUTO: 2.9 K/UL (ref 1.8–7.7)
NEUTROPHILS NFR BLD: 63.3 % (ref 38–73)
NRBC BLD-RTO: 0 /100 WBC
PLATELET # BLD AUTO: 195 K/UL (ref 150–450)
PMV BLD AUTO: 9.3 FL (ref 9.2–12.9)
POTASSIUM SERPL-SCNC: 4.1 MMOL/L (ref 3.5–5.1)
PROT SERPL-MCNC: 7.1 G/DL (ref 6–8.4)
RBC # BLD AUTO: 3.58 M/UL (ref 4.6–6.2)
SODIUM SERPL-SCNC: 140 MMOL/L (ref 136–145)
TROPONIN I SERPL DL<=0.01 NG/ML-MCNC: 0.02 NG/ML (ref 0–0.03)
WBC # BLD AUTO: 4.61 K/UL (ref 3.9–12.7)

## 2024-05-11 PROCEDURE — 83880 ASSAY OF NATRIURETIC PEPTIDE: CPT | Performed by: EMERGENCY MEDICINE

## 2024-05-11 PROCEDURE — 80053 COMPREHEN METABOLIC PANEL: CPT | Performed by: EMERGENCY MEDICINE

## 2024-05-11 PROCEDURE — 93005 ELECTROCARDIOGRAM TRACING: CPT

## 2024-05-11 PROCEDURE — 99285 EMERGENCY DEPT VISIT HI MDM: CPT | Mod: 25

## 2024-05-11 PROCEDURE — 93010 ELECTROCARDIOGRAM REPORT: CPT | Mod: ,,, | Performed by: STUDENT IN AN ORGANIZED HEALTH CARE EDUCATION/TRAINING PROGRAM

## 2024-05-11 PROCEDURE — 85025 COMPLETE CBC W/AUTO DIFF WBC: CPT | Performed by: EMERGENCY MEDICINE

## 2024-05-11 PROCEDURE — 84484 ASSAY OF TROPONIN QUANT: CPT | Performed by: EMERGENCY MEDICINE

## 2024-05-11 RX ORDER — CARVEDILOL 12.5 MG/1
25 TABLET ORAL 2 TIMES DAILY WITH MEALS
Qty: 60 TABLET | Refills: 2 | Status: SHIPPED | OUTPATIENT
Start: 2024-05-11 | End: 2025-05-11

## 2024-05-11 NOTE — ED PROVIDER NOTES
"SCRIBE #1 NOTE: I, Marla Talamantes, am scribing for, and in the presence of, Fausto Galaviz MD. I have scribed the entire note.       History     Chief Complaint   Patient presents with    Shortness of Breath     Pt c/o mid sternal chest "tightness," denies pain at this time, states intermittent shortness of breath that started Thursday. Pt states he experienced same symptoms three weeks ago. Hx of cardiac bypass 2018.      Review of patient's allergies indicates:   Allergen Reactions    Lipitor [atorvastatin] Other (See Comments)     Muscle aches and pains as well as fatigue.     Niacin preparations Other (See Comments)     Causes broken blood vessels and bruises at 4 times normal dose.    Statins-hmg-coa reductase inhibitors Other (See Comments)     Statins cause intolerable myalgias.    Iodinated contrast media Hives     Tachycardia    Omeprazole Hives and Itching    Prilosec [omeprazole magnesium] Hives and Itching         History of Present Illness     HPI    5/11/2024, 7:43 AM  History obtained from the patient      History of Present Illness: Erendira Pretty is a 78 y.o. male patient with a PMHx of DM II, HTN, HLD, CAD, PVD, BPH, asthma, cirrhosis, aortic stenosis, blood transfusion, DELONTE, and COPD who presents to the Emergency Department for evaluation of chest tightness that began 3-4 weeks ago, after his most recent visit with his cardiologist, Dr. Gallardo. Pt reports last week he experienced intermittent chest tightness. 2 days ago pt had 3-4 episodes of chest tightness. Yesterday pain was more severe when he was doing gardening work which led to his visit today. Pt denies any recent cath or stress test. Pt has a PSHx of a cardia bypass in 2018. Pt is scheduled for a cardia ablation on June 24, 2024 under Dr. Huerta. Only medicine pt took this morning PTA was his thyroid medication. Pt confirms taking aspirin. Symptoms are constant and moderate in severity. No mitigating or exacerbating factors reported. " Associated sxs include back pain and shoulder pain. Patient denies any diaphoresis, fever, N/V/D, congestion, cough, dysuria, HA, and all other sxs at this time. Pt is a former smoker. Prior Tx includes none. No further complaints or concerns at this time.       Arrival mode: Personal vehicle    PCP: Bhupinder Callaway MD        Past Medical History:  Past Medical History:   Diagnosis Date    Anticoagulant long-term use     Aortic stenosis     Asthma     Benign prostatic hyperplasia with nocturia     Bilateral carotid artery stenosis 07/21/2021     CAROTID BILATERAL 07/14/2021 IMPRESSION: Atherosclerosis with suspected stenosis greater than 50% at the right proximal ICA visually. Velocities are discordant and appear improved from prior. Atherosclerosis on the left with no evidence of flow-limiting stenosis greater than 50%.  FINDINGS: Right: Internal Carotid Artery (ICA): Peak systolic velocity 118 cm/sec. 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 09/22/2020    DELONTE on CPAP     Osteoarthritis     Paroxysmal atrial fibrillation     PVD (peripheral vascular disease)     Secondary esophageal varices without bleeding 06/26/2017    Type 2 diabetes mellitus        Past Surgical History:  Past Surgical History:   Procedure Laterality Date    ANGIOGRAM, EXTREMITY, UNILATERAL Left 1/4/2024    Procedure: ANGIOGRAM, EXTREMITY, UNILATERAL- Femoral / SMS Stent, Poss General;  Surgeon: ALEX Alfred III, MD;  Location: [facility] OR [address] [place] [address];   Service: Vascular;  Laterality: Left;  mGy : 2698.78  Gy.cm : 229.88  Contrast : 62ml  Fluro time : 13.8min    CARDIAC CATHETERIZATION      CARDIAC SURGERY      CARPAL TUNNEL RELEASE Left     CATARACT EXTRACTION      CATARACT EXTRACTION W/ INTRAOCULAR LENS  IMPLANT, BILATERAL  2008    CATHETERIZATION OF BOTH LEFT AND RIGHT HEART N/A 11/2/2018    Procedure: CATHETERIZATION, HEART, BOTH LEFT AND RIGHT;  Surgeon: Frank Gallardo MD;  Location: [facility] CATH LAB;  Service: Cardiology;  Laterality: N/A;    COLONOSCOPY  8/2013    COLONOSCOPY N/A 5/30/2016    Procedure: COLONOSCOPY;  Surgeon: Anna Tomas MD;  Location: [facility] ENDO;  Service: Endoscopy;  Laterality: N/A;    COLONOSCOPY N/A 7/17/2019    Procedure: COLONOSCOPY;  Surgeon: David Carter III, MD;  Location: [facility] ENDO;  Service: Endoscopy;  Laterality: N/A;    COLONOSCOPY N/A 12/14/2023    Procedure: COLONOSCOPY;  Surgeon: Ama Barrera MD;  Location: [facility] ENDO;  Service: Endoscopy;  Laterality: N/A;    COMPUTED TOMOGRAPHY N/A 9/9/2019    Procedure: CT (COMPUTED TOMOGRAPHY);  Surgeon: [facility] Diagnostic Provider;  Location: [facility] OR;  Service: General;  Laterality: N/A;  Microwave ablation to be done in CT.  Need CRNA and cart    COMPUTED TOMOGRAPHY N/A 1/25/2022    Procedure: Liver Microwave Ablation;  Surgeon: Red Puentes MD;  Location: [facility];  Service: General;  Laterality: N/A;    CORONARY ARTERY BYPASS GRAFT (CABG) N/A 11/5/2018    Procedure: CORONARY ARTERY BYPASS GRAFT (CABG);  Surgeon: Bear De Luna MD;  Location: [facility] OR;  Service: Cardiothoracic;  Laterality: N/A;  TWO VESSEL BYPASS WITH AORTOTOMY AND EXCISION OF ATHEROSCLEROTIC PLAQUE    CORONARY BYPASS GRAFT ANGIOGRAPHY  3/2/2020    Procedure: Bypass graft study;  Surgeon: Cora Cormier MD;  Location: [facility] CATH LAB;  Service: Cardiology;;    ELBOW BURSA SURGERY Right 2010    ENDOSCOPIC HARVEST OF VEIN Left 11/5/2018    Procedure: SURGICAL PROCUREMENT, VEIN, ENDOSCOPIC;  Surgeon: Bear  MARIALUISA De Luna MD;  Location: [facility] OR;  Service: Cardiothoracic;  Laterality: Left;    ESOPHAGOGASTRODUODENOSCOPY N/A 7/17/2019    Procedure: ESOPHAGOGASTRODUODENOSCOPY (EGD);  Surgeon: David Carter III, MD;  Location: [facility] ENDO;  Service: Endoscopy;  Laterality: N/A;    ESOPHAGOGASTRODUODENOSCOPY N/A 12/29/2022    Procedure: ESOPHAGOGASTRODUODENOSCOPY (EGD);  Surgeon: Issa Gayle MD;  Location: [facility];  Service: Endoscopy;  Laterality: N/A;    ESOPHAGOGASTRODUODENOSCOPY N/A 1/17/2024    Procedure: EGD (ESOPHAGOGASTRODUODENOSCOPY);  Surgeon: Issa Gayle MD;  Location: [facility];  Service: Endoscopy;  Laterality: N/A;    ESOPHAGOGASTRODUODENOSCOPY N/A 4/30/2024    Procedure: EGD (ESOPHAGOGASTRODUODENOSCOPY);  Surgeon: Eder Foley MD;  Location: [facility];  Service: Gastroenterology;  Laterality: N/A;    ETHMOIDECTOMY Bilateral 3/27/2019    Procedure: ETHMOIDECTOMY;  Surgeon: Alejandro Parkinson MD;  Location: [facility] OR;  Service: ENT;  Laterality: Bilateral;    EYE SURGERY      FOOT SURGERY      FRONTAL SINUS OBLITERATION Bilateral 3/27/2019    Procedure: SINUSOTOMY, FRONTAL SINUS, OBLITERATIVE;  Surgeon: Alejandro Parkinson MD;  Location: [facility] OR;  Service: ENT;  Laterality: Bilateral;    FUNCTIONAL ENDOSCOPIC SINUS SURGERY (FESS) Bilateral 3/27/2019    Procedure: FESS (FUNCTIONAL ENDOSCOPIC SINUS SURGERY);  Surgeon: Alejandro Parkinson MD;  Location: [facility] OR;  Service: ENT;  Laterality: Bilateral;  Left Keila bullosa resection     INTRALUMINAL GASTROINTESTINAL TRACT IMAGING VIA CAPSULE N/A 2/2/2024    Procedure: IMAGING PROCEDURE, GI TRACT, INTRALUMINAL, VIA CAPSULE;  Surgeon: Cooper Estrella RN;  Location: [facility] ENDO;  Service: Endoscopy;  Laterality: N/A;    KNEE ARTHROSCOPY W/ MENISCAL REPAIR Left 06/2019    dr boykin    KNEE SURGERY Right     LEFT HEART CATHETERIZATION Left 3/2/2020    Procedure: CATHETERIZATION, HEART, LEFT;  Surgeon: Cora Cormier MD;  Location: [facility] CATH LAB;  Service: Cardiology;   Laterality: Left;    LIVER BIOPSY  2022    MAXILLARY ANTROSTOMY Bilateral 3/27/2019    Procedure: MAXILLARY ANTROSTOMY;  Surgeon: Alejandro Parkinson MD;  Location: [facility] OR;  Service: ENT;  Laterality: Bilateral;    NASAL SEPTOPLASTY N/A 3/27/2019    Procedure: SEPTOPLASTY, NOSE;  Surgeon: Alejnadro Parkinson MD;  Location: [facility] OR;  Service: ENT;  Laterality: N/A;    RECTAL SURGERY  2012    STENT, SUPERIOR MESENTERIC ARTERY Left 2024    Procedure: STENT, SUPERIOR MESENTERIC ARTERY;  Surgeon: ALEX Alfred III, MD;  Location: [address];  Service: Vascular;  Laterality: Left;    TRANSURETHRAL RESECTION OF PROSTATE (TURP) WITHOUT USE OF LASER N/A 2018    Procedure: TURP, WITHOUT USING LASER;  Surgeon: Cooper Cordova IV, MD;  Location: [facility];  Service: Urology;  Laterality: N/A;    TRIGGER FINGER RELEASE Left          Family History:  Family History   Problem Relation Name Age of Onset    Heart disease Mother      Heart disease Father      Heart disease Brother      Colon cancer Neg Hx         Social History:  Social History     Tobacco Use    Smoking status: Former     Current packs/day: 0.00     Average packs/day: 0.5 packs/day for 4.0 years (2.0 ttl pk-yrs)     Types: Cigarettes     Start date: 1968     Quit date: 1972     Years since quittin.9    Smokeless tobacco: Former     Types: Chew     Quit date: 1976   Substance and Sexual Activity    Alcohol use: Yes     Alcohol/week: 2.0 standard drinks of alcohol     Types: 2 Cans of beer per week    Drug use: No    Sexual activity: Yes     Partners: Female        Review of Systems     Review of Systems   Constitutional:  Negative for diaphoresis and fever.   HENT:  Negative for congestion and sore throat.    Respiratory:  Positive for chest tightness. Negative for cough and shortness of breath.    Cardiovascular:  Negative for chest pain.   Gastrointestinal:  Negative for diarrhea, nausea and vomiting.   Genitourinary:  Negative for dysuria.    Musculoskeletal:  Positive for back pain and myalgias (between shoulders).   Skin:  Negative for rash.   Neurological:  Negative for weakness and headaches.   Hematological:  Does not bruise/bleed easily.   All other systems reviewed and are negative.       Physical Exam     Initial Vitals [05/11/24 0727]   BP Pulse Resp Temp SpO2   130/60 72 18 98.4 °F (36.9 °C) 99 %      MAP       --          Physical Exam  Nursing Notes and Vital Signs Reviewed.  Constitutional: Patient is in no acute distress. Well-developed and well-nourished.  Head: Atraumatic. Normocephalic.  Eyes: PERRL. EOM intact. Conjunctivae are not pale. No scleral icterus.  ENT: Mucous membranes are moist. Oropharynx is clear and symmetric.    Neck: Supple. Full ROM. No lymphadenopathy.  Cardiovascular: Regular rate. Regular rhythm. Murmur upon auscultation.   Pulmonary/Chest: No respiratory distress. Clear to auscultation bilaterally. No wheezing or rales.  Abdominal: Soft and non-distended.  There is no tenderness.  No rebound, guarding, or rigidity.  Genitourinary: No CVA tenderness  Musculoskeletal: Moves all extremities. No obvious deformities. No edema.  Skin: Warm and dry.  Neurological:  Alert, awake, and appropriate.  Normal speech.  No acute focal neurological deficits are appreciated.  Psychiatric: Normal affect. Good eye contact. Appropriate in content.     ED Course   Procedures  ED Vital Signs:  Vitals:    05/11/24 0725 05/11/24 0727   BP:  130/60   Pulse:  72   Resp:  18   Temp:  98.4 °F (36.9 °C)   TempSrc:  Oral   SpO2:  99%   Weight: 100.6 kg (221 lb 12.8 oz)        Abnormal Lab Results:  Labs Reviewed   CBC W/ AUTO DIFFERENTIAL - Abnormal; Notable for the following components:       Result Value    RBC 3.58 (*)     Hemoglobin 9.5 (*)     Hematocrit 30.2 (*)     MCH 26.5 (*)     MCHC 31.5 (*)     RDW 22.4 (*)     Lymph # 0.9 (*)     All other components within normal limits   B-TYPE NATRIURETIC PEPTIDE - Abnormal; Notable for the  following components:     (*)     All other components within normal limits   COMPREHENSIVE METABOLIC PANEL   TROPONIN I   TROPONIN I        All Lab Results:  Results for orders placed or performed during the hospital encounter of 05/11/24   CBC auto differential   Result Value Ref Range    WBC 4.61 3.90 - 12.70 K/uL    RBC 3.58 (L) 4.60 - 6.20 M/uL    Hemoglobin 9.5 (L) 14.0 - 18.0 g/dL    Hematocrit 30.2 (L) 40.0 - 54.0 %    MCV 84 82 - 98 fL    MCH 26.5 (L) 27.0 - 31.0 pg    MCHC 31.5 (L) 32.0 - 36.0 g/dL    RDW 22.4 (H) 11.5 - 14.5 %    Platelets 195 150 - 450 K/uL    MPV 9.3 9.2 - 12.9 fL    Immature Granulocytes 0.4 0.0 - 0.5 %    Gran # (ANC) 2.9 1.8 - 7.7 K/uL    Immature Grans (Abs) 0.02 0.00 - 0.04 K/uL    Lymph # 0.9 (L) 1.0 - 4.8 K/uL    Mono # 0.6 0.3 - 1.0 K/uL    Eos # 0.1 0.0 - 0.5 K/uL    Baso # 0.04 0.00 - 0.20 K/uL    nRBC 0 0 /100 WBC    Gran % 63.3 38.0 - 73.0 %    Lymph % 20.2 18.0 - 48.0 %    Mono % 12.6 4.0 - 15.0 %    Eosinophil % 2.6 0.0 - 8.0 %    Basophil % 0.9 0.0 - 1.9 %    Differential Method Automated    Comprehensive metabolic panel   Result Value Ref Range    Sodium 140 136 - 145 mmol/L    Potassium 4.1 3.5 - 5.1 mmol/L    Chloride 105 95 - 110 mmol/L    CO2 27 23 - 29 mmol/L    Glucose 110 70 - 110 mg/dL    BUN 20 8 - 23 mg/dL    Creatinine 1.2 0.5 - 1.4 mg/dL    Calcium 8.8 8.7 - 10.5 mg/dL    Total Protein 7.1 6.0 - 8.4 g/dL    Albumin 3.7 3.5 - 5.2 g/dL    Total Bilirubin 0.3 0.1 - 1.0 mg/dL    Alkaline Phosphatase 62 55 - 135 U/L    AST 18 10 - 40 U/L    ALT 12 10 - 44 U/L    eGFR >60 >60 mL/min/1.73 m^2    Anion Gap 8 8 - 16 mmol/L   Troponin I #1   Result Value Ref Range    Troponin I 0.020 0.000 - 0.026 ng/mL   BNP   Result Value Ref Range     (H) 0 - 99 pg/mL     *Note: Due to a large number of results and/or encounters for the requested time period, some results have not been displayed. A complete set of results can be found in Results Review.         Imaging  Results:  Imaging Results              X-Ray Chest AP Portable (Final result)  Result time 05/11/24 08:18:21      Final result by Jamar Shine MD (05/11/24 08:18:21)                   Impression:      Unchanged with no new acute cardiopulmonary abnormality.      Electronically signed by: Jamar Shine  Date:    05/11/2024  Time:    08:18               Narrative:    EXAMINATION:  XR CHEST AP PORTABLE    CLINICAL HISTORY:  Chest Pain;    TECHNIQUE:  Single frontal portable view of the chest was performed.    COMPARISON:  X-ray dated 05/10/2024    FINDINGS:  Median sternotomy wires are unchanged.  Aortic arch calcification noted.  Cardiomediastinal silhouette is unchanged in size and contour.  Trachea is midline.  Pulmonary vasculature is unremarkable.  Lungs are clear.  No significant pleural effusion or pneumothorax.  No acute bony abnormality.                                       The EKG was ordered, reviewed, and independently interpreted by the ED provider.  Interpretation time: 7:27  Rate: 68 BPM  Rhythm:  Sinus rhythm with sinus arrhythmia with 1st degree A-V block  Interpretation: No acute ST changes. No STEMI.           The Emergency Provider reviewed the vital signs and test results, which are outlined above.     ED Discussion     09:28 AM: Discussed pt's case with Dr. Serna (Cardiology) who recommends increasing coreg to 25 mg BID and seeing pt in the clinic.  She looked at his chart and his last stress 8/23 was normal. Echo with normal EF. EKG no ischemia. neg troponin. Will need to be on antianginals. He has not had any chest pain or tightness in ER.  He was already on Ranexa and he is also on sildenafil so she will plan to only increase his Coreg for now.  She will also have Cardiology Clinic reach out to him for follow-up.  Patient felt very comfortable going home.  No symptoms in the emergency room.      10:49 AM: Reassessed pt at this time. Discussed with pt all pertinent ED information  and results. Discussed pt dx and plan of tx. Gave pt all f/u and return to the ED instructions. All questions and concerns were addressed at this time. Pt expresses understanding of information and instructions, and is comfortable with plan to discharge. Pt is stable for discharge. Coreg dosage is increased.    I discussed with patient and/or family/caretaker that evaluation in the ED does not suggest any emergent or life threatening medical conditions requiring immediate intervention beyond what was provided in the ED, and I believe patient is safe for discharge.  Regardless, an unremarkable evaluation in the ED does not preclude the development or presence of a serious of life threatening condition. As such, patient was instructed to return immediately for any worsening or change in current symptoms.         Medical Decision Making  Amount and/or Complexity of Data Reviewed  Labs: ordered. Decision-making details documented in ED Course.  Radiology: ordered. Decision-making details documented in ED Course.  ECG/medicine tests: ordered and independent interpretation performed. Decision-making details documented in ED Course.    Risk  Prescription drug management.  Risk Details: Differential diagnoses: Myocardial infarction, angina, pneumonia, asthma, infection, pneumothorax, pericarditis , pleural effusion, GERD, pancreatitis, gallstone pain, cholelithiasis or cholecystitis, esophageal rupture, bowel perforation, gastritis, nonspecific chest pain, musculoskeletal chest pain, costochondritis, aortic dissection,                   ED Medication(s):  Medications - No data to display    Current Discharge Medication List           Follow-up Information       Bhupinder Callaway MD. Schedule an appointment as soon as possible for a visit in 1 day.    Specialties: Internal Medicine, Pediatrics  Contact information:  [address]  [place] [address] [phone]               Frank Gallardo MD. Schedule  an appointment as soon as possible for a visit in 2 days.    Specialties: Interventional Cardiology, Cardiology  Why: Cardiology should be calling you for a follow up appointment but if you do not hear from them in 24 hours please call to make an appointment  Contact information:  [address] [place] [address] Dr Samina DEE [address]  [phone]                                 Scribe Attestation:   Scribe #1: I performed the above scribed service and the documentation accurately describes the services I performed. I attest to the accuracy of the note.     Attending:   Physician Attestation Statement for Scribe #1: I, Fausto Galaviz MD, personally performed the services described in this documentation, as scribed by Marla Talamantes, in my presence, and it is both accurate and complete.           Clinical Impression       ICD-10-CM ICD-9-CM   1. Chest pain, unspecified type  R07.9 786.50   2. SOB (shortness of breath)  R06.02 786.05       Disposition:   Disposition: Discharged  Condition: Stable         Fausto Galaviz MD  05/11/24 1670

## 2024-05-12 LAB
OHS QRS DURATION: 94 MS
OHS QTC CALCULATION: 444 MS

## 2024-05-13 ENCOUNTER — OFFICE VISIT (OUTPATIENT)
Dept: INTERNAL MEDICINE | Facility: CLINIC | Age: 79
End: 2024-05-13
Payer: MEDICARE

## 2024-05-13 ENCOUNTER — TELEPHONE (OUTPATIENT)
Dept: INTERNAL MEDICINE | Facility: CLINIC | Age: 79
End: 2024-05-13

## 2024-05-13 VITALS
OXYGEN SATURATION: 97 % | BODY MASS INDEX: 34.64 KG/M2 | DIASTOLIC BLOOD PRESSURE: 72 MMHG | WEIGHT: 220.69 LBS | HEART RATE: 101 BPM | RESPIRATION RATE: 16 BRPM | HEIGHT: 67 IN | TEMPERATURE: 98 F | SYSTOLIC BLOOD PRESSURE: 128 MMHG

## 2024-05-13 DIAGNOSIS — I48.0 PAROXYSMAL ATRIAL FIBRILLATION: ICD-10-CM

## 2024-05-13 DIAGNOSIS — E11.29 TYPE 2 DIABETES MELLITUS WITH MICROALBUMINURIA, WITH LONG-TERM CURRENT USE OF INSULIN: Chronic | ICD-10-CM

## 2024-05-13 DIAGNOSIS — I20.89 EXERTIONAL ANGINA: Primary | ICD-10-CM

## 2024-05-13 DIAGNOSIS — I10 ESSENTIAL HYPERTENSION: Chronic | ICD-10-CM

## 2024-05-13 DIAGNOSIS — I25.10 CORONARY ARTERY DISEASE INVOLVING NATIVE CORONARY ARTERY OF NATIVE HEART WITHOUT ANGINA PECTORIS: Chronic | ICD-10-CM

## 2024-05-13 DIAGNOSIS — D50.0 IRON DEFICIENCY ANEMIA DUE TO CHRONIC BLOOD LOSS: ICD-10-CM

## 2024-05-13 DIAGNOSIS — Z79.4 TYPE 2 DIABETES MELLITUS WITH MICROALBUMINURIA, WITH LONG-TERM CURRENT USE OF INSULIN: Chronic | ICD-10-CM

## 2024-05-13 DIAGNOSIS — R80.9 TYPE 2 DIABETES MELLITUS WITH MICROALBUMINURIA, WITH LONG-TERM CURRENT USE OF INSULIN: Chronic | ICD-10-CM

## 2024-05-13 PROCEDURE — 3078F DIAST BP <80 MM HG: CPT | Mod: CPTII,S$GLB,, | Performed by: PEDIATRICS

## 2024-05-13 PROCEDURE — 1159F MED LIST DOCD IN RCRD: CPT | Mod: CPTII,S$GLB,, | Performed by: PEDIATRICS

## 2024-05-13 PROCEDURE — 1101F PT FALLS ASSESS-DOCD LE1/YR: CPT | Mod: CPTII,S$GLB,, | Performed by: PEDIATRICS

## 2024-05-13 PROCEDURE — 3072F LOW RISK FOR RETINOPATHY: CPT | Mod: CPTII,S$GLB,, | Performed by: PEDIATRICS

## 2024-05-13 PROCEDURE — 99214 OFFICE O/P EST MOD 30 MIN: CPT | Mod: S$GLB,,, | Performed by: PEDIATRICS

## 2024-05-13 PROCEDURE — 1157F ADVNC CARE PLAN IN RCRD: CPT | Mod: CPTII,S$GLB,, | Performed by: PEDIATRICS

## 2024-05-13 PROCEDURE — 1160F RVW MEDS BY RX/DR IN RCRD: CPT | Mod: CPTII,S$GLB,, | Performed by: PEDIATRICS

## 2024-05-13 PROCEDURE — 1126F AMNT PAIN NOTED NONE PRSNT: CPT | Mod: CPTII,S$GLB,, | Performed by: PEDIATRICS

## 2024-05-13 PROCEDURE — 99999 PR PBB SHADOW E&M-EST. PATIENT-LVL III: CPT | Mod: PBBFAC,,, | Performed by: PEDIATRICS

## 2024-05-13 PROCEDURE — 3074F SYST BP LT 130 MM HG: CPT | Mod: CPTII,S$GLB,, | Performed by: PEDIATRICS

## 2024-05-13 PROCEDURE — 3288F FALL RISK ASSESSMENT DOCD: CPT | Mod: CPTII,S$GLB,, | Performed by: PEDIATRICS

## 2024-05-13 NOTE — PROGRESS NOTES
Subjective     Patient ID: Erendira Pretty is a 78 y.o. male.    Chief Complaint: Hospital Follow Up    Erendira Pretty is a 78 year old male here for a hospital follow up. Pt was seen in the ER on Saturday for chest tightness. Dr. Serna (Cardiology) increased pt's coreg dose to 25 mg bid. He has not done so yet. No further angina since ER visit. Note and testing reviewed with pt.      Review of Systems   Constitutional:  Negative for chills and fever.   HENT:  Negative for nasal congestion and rhinorrhea.    Respiratory:  Positive for chest tightness. Negative for cough and shortness of breath.    Cardiovascular:  Negative for chest pain.   Gastrointestinal:  Negative for abdominal pain, blood in stool, change in bowel habit, constipation, diarrhea and nausea.   Endocrine: Negative for cold intolerance, heat intolerance, polydipsia, polyphagia and polyuria.   Genitourinary:  Negative for dysuria.   Neurological:  Negative for weakness.          Objective     Physical Exam  Constitutional:       General: He is not in acute distress.     Appearance: Normal appearance. He is normal weight. He is not ill-appearing, toxic-appearing or diaphoretic.   HENT:      Head: Atraumatic.   Neck:      Vascular: No carotid bruit.   Cardiovascular:      Rate and Rhythm: Normal rate and regular rhythm.      Pulses: Normal pulses.      Heart sounds: Murmur (grade 1-2 holosystolic, loudest at left sternal border) heard.   Pulmonary:      Effort: Pulmonary effort is normal. No respiratory distress.      Breath sounds: Normal breath sounds. No stridor. No wheezing, rhonchi or rales.   Chest:      Chest wall: No tenderness.   Abdominal:      General: Abdomen is flat. Bowel sounds are normal. There is no distension.      Palpations: Abdomen is soft. There is no mass.      Tenderness: There is no abdominal tenderness. There is no guarding.   Neurological:      General: No focal deficit present.      Mental Status: He is alert and oriented  to person, place, and time. Mental status is at baseline.      Cranial Nerves: No cranial nerve deficit.      Sensory: No sensory deficit.      Motor: No weakness.      Gait: Gait normal.   Psychiatric:         Mood and Affect: Mood normal.         Behavior: Behavior normal.            Assessment and Plan     1. Exertional angina    2. Type 2 diabetes mellitus with microalbuminuria, with long-term current use of insulin    3. Paroxysmal atrial fibrillation    4. Essential hypertension    5. Coronary artery disease involving native coronary artery of native heart without angina pectoris  Overview:  40% lesion 2002;violet      6. Iron deficiency anemia due to chronic blood loss        Increase coreg to 25 bid. He is already on nitrate/antianginals. Staff will set up appointment with cardiology.    Transitional Care Note    Family and/or Caretaker present at visit?  No.  Diagnostic tests reviewed/disposition: I have reviewed all completed as well as pending diagnostic tests at the time of discharge.  Disease/illness education: given  Home health/community services discussion/referrals: Patient does not have home health established from hospital visit.  They do not need home health.  If needed, we will set up home health for the patient.   Establishment or re-establishment of referral orders for community resources:  Subspecialty care established .   Discussion with other health care providers:  Subspecialty care .             No follow-ups on file.

## 2024-05-13 NOTE — DISCHARGE SUMMARY
"[facility] Medicine  Discharge Summary      Patient Name: Erendira Pretty  MRN: [id number]  LLOYD: [id number]  Patient Class: OP- Observation  Admission Date: 4/28/2024  Hospital Length of Stay: 0 days  Discharge Date and Time: 4/30/2024 12:29 PM  Attending Physician: No att. providers found   Discharging Provider: Jazzy Cheung NP  Primary Care Provider: Bhupinder Callaway MD    Primary Care Team: [facility]    HPI:   Mr. Pretty is a 78 year old male with a history of CAD s/p CABG, hepatocellular carcinoma, esophageal varices, DM, HTN, GERD, PAF (on xarelto), SMA stenting 01/05/2024,  and blaise on cpap who presents to the ED for evaluation of two episodes of blood in stool described as "black and oily" started today. Pt is on xarelto (last dose 4/27) and baby aspirin (last dose 4/28) but was taken off Plavix.  Patient denies any BRBPR, abdominal pain, and hematemesis.  Lab work in the ED notable for hgb 9.4, stool OCB +.  Case discussed with GI and ED, recommended PPI IV BID and NPO 0000 for scope.  Patient will be admitted to observation for UGI bleed.    Of note, EGD (01/17/24): Grade I esophageal varices without bleeding and gastritis.     Code Status Full  Surrogate Decision Maker Henrry willis     Procedure(s) (LRB):  EGD (ESOPHAGOGASTRODUODENOSCOPY) (N/A)      Hospital Course:   Erendira Pretty is a 78 year old male who presented to ED for melena. H/H with mild decline. He was seen by GI who will consider EGD if patient has another episode of melena or H/H declines further. Will continue clear liquid diet today. He was placed NPO and underwent upper GI which demonstrated gastritis.  He is medically stable for DC from GI standpoint. He is instructed to conitnue PPI at 40 mg bid and follow up with cardiology regarding oral anticoagulation and recurrent melena.        Goals of Care Treatment Preferences:  Code Status: Full Code    Living Will: Yes              Consults:   Consults (From " admission, onward)          Status Ordering Provider     Inpatient consult to Gastroenterology  Once        Provider:  Eder Foley MD    Completed TATE CHUNG            No new Assessment & Plan notes have been filed under this hospital service since the last note was generated.  Service: Hospital Medicine    Final Active Diagnoses:    Diagnosis Date Noted POA    PRINCIPAL PROBLEM:  Melena [K92.1] 01/16/2024 Yes    Paroxysmal atrial fibrillation [I48.0] 07/21/2021 Yes    Type 2 diabetes mellitus with microalbuminuria, with long-term current use of insulin [E11.29, R80.9, Z79.4] 07/21/2021 Not Applicable     Chronic    DELONTE on CPAP [G47.33] 07/07/2015 Yes     Chronic    Benign prostatic hyperplasia with nocturia [N40.1, R35.1]  Yes     Chronic    Coronary artery disease involving native coronary artery of native heart without angina pectoris [I25.10]  Yes     Chronic    Essential hypertension [I10] 07/08/2013 Yes     Chronic      Problems Resolved During this Admission:       Discharged Condition: stable    Disposition: Home or Self Care    Follow Up:    Patient Instructions:   No discharge procedures on file.    Significant Diagnostic Studies: N/A    Pending Diagnostic Studies:       None           Medications:  Reconciled Home Medications:      Medication List        CHANGE how you take these medications      RABEprazole 20 mg tablet  Commonly known as: ACIPHEX  Take 2 tablets (40 mg total) by mouth 2 (two) times daily.  What changed: how much to take            CONTINUE taking these medications      ACCU-CHEK GRACE PLUS METER Misc  Generic drug: blood-glucose meter  AS DIRECTED     ACCU-CHEK FASTCLIX LANCET DRUM Misc  Generic drug: lancets  TEST TWO TIMES A DAY     aspirin 81 MG EC tablet  Commonly known as: ECOTRIN  Take 1 tablet (81 mg total) by mouth once daily.     blood sugar diagnostic Strp  Commonly known as: ACCU-CHEK GRACE PLUS TEST STRP  TEST THREE TIMES A DAY     finasteride 5 mg  "tablet  Commonly known as: PROSCAR  TAKE 1 TABLET BY MOUTH once daily     furosemide 40 MG tablet  Commonly known as: LASIX  Take 1 tablet (40 mg total) by mouth 2 (two) times daily.     insulin glargine U-100 (Lantus) 100 unit/mL (3 mL) Inpn pen  Commonly known as: LANTUS SOLOSTAR U-100 INSULIN  Inject 44 Units into the skin every evening.     JARDIANCE 25 mg tablet  Generic drug: empagliflozin  TAKE 1 TABLET BY MOUTH EVERY DAY     levocetirizine 5 MG tablet  Commonly known as: XYZAL  Take 1 tablet (5 mg total) by mouth every evening.     levothyroxine 50 MCG tablet  Commonly known as: SYNTHROID  Take 1 tablet (50 mcg total) by mouth before breakfast.     metFORMIN 500 MG ER 24hr tablet  Commonly known as: GLUCOPHAGE-XR  Take 1 tablet (500 mg total) by mouth 2 (two) times daily with meals.     mirabegron 25 mg Tb24 ER tablet  Commonly known as: MYRBETRIQ  Take 1 tablet (25 mg total) by mouth once daily.     montelukast 10 mg tablet  Commonly known as: SINGULAIR  Take 1 tablet (10 mg total) by mouth once daily.     OSTEO BI-FLEX ORAL  Take 1 tablet by mouth 2 (two) times daily.     pen needle, diabetic 31 gauge x 3/16" Ndle  Commonly known as: BD ULTRA-FINE MINI PEN NEEDLE  USE AS DIRECTED     propafenone 150 MG Tab  Commonly known as: RHTHYMOL  Take 1 tablet (150 mg total) by mouth every 8 (eight) hours.     ranolazine 1,000 mg Tb12  Commonly known as: RANEXA  Take 1 tablet (1,000 mg total) by mouth 2 (two) times daily.     rivaroxaban 20 mg Tab  Commonly known as: XARELTO  Take 1 tablet (20 mg total) by mouth daily with dinner or evening meal.     sildenafiL 100 MG tablet  Commonly known as: VIAGRA  Take 1/2 to 1 tablet by mouth one hour prior to intercourse. Max 100mg per day            STOP taking these medications      amLODIPine 10 MG tablet  Commonly known as: NORVASC              Indwelling Lines/Drains at time of discharge:   Lines/Drains/Airways       None                   Time spent on the discharge of " patient: 45 minutes         Jazzy Cheung NP  Department of Hospital Medicine  '[facility]

## 2024-05-16 ENCOUNTER — OFFICE VISIT (OUTPATIENT)
Dept: OTOLARYNGOLOGY | Facility: CLINIC | Age: 79
End: 2024-05-16
Payer: MEDICARE

## 2024-05-16 DIAGNOSIS — H61.23 BILATERAL IMPACTED CERUMEN: ICD-10-CM

## 2024-05-16 DIAGNOSIS — Z98.890 HISTORY OF ENDOSCOPIC SINUS SURGERY: ICD-10-CM

## 2024-05-16 DIAGNOSIS — J32.4 CHRONIC PANSINUSITIS: Primary | ICD-10-CM

## 2024-05-16 DIAGNOSIS — J04.0: ICD-10-CM

## 2024-05-16 PROCEDURE — 99999 PR PBB SHADOW E&M-EST. PATIENT-LVL I: CPT | Mod: PBBFAC,,, | Performed by: STUDENT IN AN ORGANIZED HEALTH CARE EDUCATION/TRAINING PROGRAM

## 2024-05-16 PROCEDURE — 1101F PT FALLS ASSESS-DOCD LE1/YR: CPT | Mod: CPTII,S$GLB,, | Performed by: STUDENT IN AN ORGANIZED HEALTH CARE EDUCATION/TRAINING PROGRAM

## 2024-05-16 PROCEDURE — 92511 NASOPHARYNGOSCOPY: CPT | Mod: S$GLB,,, | Performed by: STUDENT IN AN ORGANIZED HEALTH CARE EDUCATION/TRAINING PROGRAM

## 2024-05-16 PROCEDURE — 1157F ADVNC CARE PLAN IN RCRD: CPT | Mod: CPTII,S$GLB,, | Performed by: STUDENT IN AN ORGANIZED HEALTH CARE EDUCATION/TRAINING PROGRAM

## 2024-05-16 PROCEDURE — 3288F FALL RISK ASSESSMENT DOCD: CPT | Mod: CPTII,S$GLB,, | Performed by: STUDENT IN AN ORGANIZED HEALTH CARE EDUCATION/TRAINING PROGRAM

## 2024-05-16 PROCEDURE — 69210 REMOVE IMPACTED EAR WAX UNI: CPT | Mod: 51,S$GLB,, | Performed by: STUDENT IN AN ORGANIZED HEALTH CARE EDUCATION/TRAINING PROGRAM

## 2024-05-16 PROCEDURE — 3072F LOW RISK FOR RETINOPATHY: CPT | Mod: CPTII,S$GLB,, | Performed by: STUDENT IN AN ORGANIZED HEALTH CARE EDUCATION/TRAINING PROGRAM

## 2024-05-16 PROCEDURE — 1159F MED LIST DOCD IN RCRD: CPT | Mod: CPTII,S$GLB,, | Performed by: STUDENT IN AN ORGANIZED HEALTH CARE EDUCATION/TRAINING PROGRAM

## 2024-05-16 PROCEDURE — 99214 OFFICE O/P EST MOD 30 MIN: CPT | Mod: 25,S$GLB,, | Performed by: STUDENT IN AN ORGANIZED HEALTH CARE EDUCATION/TRAINING PROGRAM

## 2024-05-16 NOTE — PROGRESS NOTES
Referring Provider:    Bhupinder Callaway Md  [id number] [facility]  [place]  [place] [address]  Subjective:   Patient: Erendira Pretty [id number], :1945   Visit date:2024 9:32 AM    Chief Complaint: see below     Cerumen Impaction (Bilateral ) and Sore Throat (F/u laryngitis )      HPI:    Erendira Pretty is a 78 y.o. male with history of ulcerative laryngitis and chronic sinus issues.     The patient endorses bilateral worsening hearing loss and tinnitus. Onset of this chief complaint was about years  ago, but worsened over last couple weeks. Noted cerumen by PCP. Known noise exposure from work.  Known prior high fz Sensorineural Hearing Loss.     Objective:     Physical Exam:  Vitals:  There were no vitals taken for this visit.  General appearance:  Well developed, well nourished    Ears:  Otoscopy of external auditory canals and tympanic membranes was normal, clinical speech reception thresholds grossly intact, no mass/lesion of auricle.    Nose:  See below    Mouth:  No mass/lesion of lips, teeth, gums, hard/soft palate, tongue, tonsils, or oropharynx.    Neck & Lymphatics:  No cervical lymphadenopathy, no neck mass/crepitus/ asymmetry, trachea is midline, no thyroid enlargement/tenderness/mass.    Procedure: ear microscopy with removal of cerumen    Description: The patient was in agreement with the examination and debridement of the ears. Removal of the cerumen required use of an operating microscope and multiple micro-instruments.     With the patient in the supine position, we used the operating microscope to examine both ears with the appropriate sized ear speculum.  A variety of sterile, micro-instruments were utilized to remove the cerumen atraumatically.  I performed the procedure which required a significant amount of time and effort. The tympanic membrane was then well visualized.  The patient tolerated the procedure well and there were no complications.    Findings:   Right ear had significant  wax, the EAC was normal, and the tympanic membrane was intact with no evidence of middle ear fluid.    Left ear had significant wax, the EAC was normal, and the tympanic membrane was intact with no evidence of middle ear fluid.        Data review  Review of records:      I reviewed records from the referring provider's office visits.  These describe the history, workup, and/or treatment of this problem thus far.        S/p EGD 12/29/22    EGD results  Impression:            - Small (< 5 mm) esophageal varices.                          - Z-line regular, 40 cm from the incisors.                          - Gastritis. Biopsied.                          - Normal examined duodenum.   Recommendation:        - Discharge patient to home (via wheelchair).                          - Resume previous diet.                          - Continue present medications.                          - Repeat upper endoscopy in 2 years for                          surveillance.                          - Return to referring physician as previously                          scheduled.       Images    MRI brain 2/2024 (unable to view images today)  No acute ischemia or other overt acute finding intracranial; sinusitis       Procedure - Fiberoptic Nasopharyngoscopy and laryngoscopy     Surgeon: Flaco Bailon M.D. .      Anesthesia: topical 0.05% oxymetazoline with 4% lidocaine      Complications: None.     Indication: history of prior sinus surgery and recent MRI with concern for sinusitis     Description of Procedure: With the patient in the sitting position, topical lidocaine and oxymetazoline was applied to the nose. The scope was passed through the nose.  On each side the nasal cavity, sinuses (if open), turbinates, middle and superior meatus, sphenoethmoidal recess and septum were examined with the findings described below. At the end of the examination, the scope was removed. The patient tolerated the procedure well with no complications.        Endoscopic Sinonasal Exam Findings:  -     The right side has normal mucosa  -     The left side has normal mucosa  -     Nasal secretions: No discolored secretions noted bilaterally  -     Nasal septum: no significant deviation or perforation appreciated   -     Inferior turbinate: Normal mucosa without significant hypertrophy bilaterally  -     Middle turbinate: Normal mucosa without significant hypertrophy bilaterally  -     Other findings: : No masses or lesions in the nose, nasopharynx, oropharynx, hypopharynx.   Vocal fold abduction and adduction is normal. No pooling of secretions in the piriform sinuses, penetration, or aspiration.           Assessment & Plan:   Chronic pansinusitis    Bilateral impacted cerumen  -     Ambulatory referral/consult to ENT    Acute ulcerative laryngitis    History of endoscopic sinus surgery        Cerumen Impaction  We discussed preventative measures and treatment options.  Q-tips must be avoided, instead the ears can be cleaned with OTC ear rinses (or mineral oil).  If the cerumen impacts the ear canal and causes hearing loss or infection he needs to follow-up in the clinic for treatment and cleaning.    Audio at his convenience for worsening tinnitus and hearing in noise.       Sinuses clear today. Laryngeal ulcerations resolved.

## 2024-05-17 ENCOUNTER — HOSPITAL ENCOUNTER (OUTPATIENT)
Dept: RADIOLOGY | Facility: HOSPITAL | Age: 79
Discharge: HOME | End: 2024-05-17
Attending: PEDIATRICS
Payer: MEDICARE

## 2024-05-17 ENCOUNTER — TELEPHONE (OUTPATIENT)
Dept: INFUSION THERAPY | Facility: HOSPITAL | Age: 79
End: 2024-05-17
Payer: MEDICARE

## 2024-05-17 DIAGNOSIS — R10.32 LEFT GROIN PAIN: ICD-10-CM

## 2024-05-17 PROCEDURE — 76882 US LMTD JT/FCL EVL NVASC XTR: CPT | Mod: 26,LT,, | Performed by: RADIOLOGY

## 2024-05-17 PROCEDURE — 76882 US LMTD JT/FCL EVL NVASC XTR: CPT | Mod: TC,PO,LT

## 2024-05-23 ENCOUNTER — OFFICE VISIT (OUTPATIENT)
Dept: CARDIOLOGY | Facility: CLINIC | Age: 79
End: 2024-05-23
Payer: MEDICARE

## 2024-05-23 VITALS
BODY MASS INDEX: 34.98 KG/M2 | HEART RATE: 84 BPM | DIASTOLIC BLOOD PRESSURE: 60 MMHG | OXYGEN SATURATION: 97 % | SYSTOLIC BLOOD PRESSURE: 122 MMHG | WEIGHT: 223.31 LBS

## 2024-05-23 DIAGNOSIS — D50.0 IRON DEFICIENCY ANEMIA DUE TO CHRONIC BLOOD LOSS: ICD-10-CM

## 2024-05-23 DIAGNOSIS — E66.01 CLASS 2 SEVERE OBESITY DUE TO EXCESS CALORIES WITH SERIOUS COMORBIDITY AND BODY MASS INDEX (BMI) OF 37.0 TO 37.9 IN ADULT: ICD-10-CM

## 2024-05-23 DIAGNOSIS — I65.23 BILATERAL CAROTID ARTERY STENOSIS: Chronic | ICD-10-CM

## 2024-05-23 DIAGNOSIS — I35.0 NONRHEUMATIC AORTIC VALVE STENOSIS: ICD-10-CM

## 2024-05-23 DIAGNOSIS — I48.0 PAROXYSMAL ATRIAL FIBRILLATION: ICD-10-CM

## 2024-05-23 DIAGNOSIS — R06.09 DOE (DYSPNEA ON EXERTION): ICD-10-CM

## 2024-05-23 DIAGNOSIS — E11.29 TYPE 2 DIABETES MELLITUS WITH MICROALBUMINURIA, WITH LONG-TERM CURRENT USE OF INSULIN: Chronic | ICD-10-CM

## 2024-05-23 DIAGNOSIS — I25.10 CORONARY ARTERY DISEASE INVOLVING NATIVE CORONARY ARTERY OF NATIVE HEART WITHOUT ANGINA PECTORIS: Primary | Chronic | ICD-10-CM

## 2024-05-23 DIAGNOSIS — I49.3 PVC (PREMATURE VENTRICULAR CONTRACTION): ICD-10-CM

## 2024-05-23 DIAGNOSIS — Z95.1 S/P CABG X 2: ICD-10-CM

## 2024-05-23 DIAGNOSIS — R80.9 TYPE 2 DIABETES MELLITUS WITH MICROALBUMINURIA, WITH LONG-TERM CURRENT USE OF INSULIN: Chronic | ICD-10-CM

## 2024-05-23 DIAGNOSIS — Z79.4 TYPE 2 DIABETES MELLITUS WITH MICROALBUMINURIA, WITH LONG-TERM CURRENT USE OF INSULIN: Chronic | ICD-10-CM

## 2024-05-23 DIAGNOSIS — G47.33 OSA ON CPAP: Chronic | ICD-10-CM

## 2024-05-23 PROCEDURE — 3074F SYST BP LT 130 MM HG: CPT | Mod: CPTII,S$GLB,, | Performed by: NURSE PRACTITIONER

## 2024-05-23 PROCEDURE — 3072F LOW RISK FOR RETINOPATHY: CPT | Mod: CPTII,S$GLB,, | Performed by: NURSE PRACTITIONER

## 2024-05-23 PROCEDURE — 99215 OFFICE O/P EST HI 40 MIN: CPT | Mod: S$GLB,,, | Performed by: NURSE PRACTITIONER

## 2024-05-23 PROCEDURE — 1101F PT FALLS ASSESS-DOCD LE1/YR: CPT | Mod: CPTII,S$GLB,, | Performed by: NURSE PRACTITIONER

## 2024-05-23 PROCEDURE — 1157F ADVNC CARE PLAN IN RCRD: CPT | Mod: CPTII,S$GLB,, | Performed by: NURSE PRACTITIONER

## 2024-05-23 PROCEDURE — 99999 PR PBB SHADOW E&M-EST. PATIENT-LVL II: CPT | Mod: PBBFAC,,, | Performed by: NURSE PRACTITIONER

## 2024-05-23 PROCEDURE — 1159F MED LIST DOCD IN RCRD: CPT | Mod: CPTII,S$GLB,, | Performed by: NURSE PRACTITIONER

## 2024-05-23 PROCEDURE — 3288F FALL RISK ASSESSMENT DOCD: CPT | Mod: CPTII,S$GLB,, | Performed by: NURSE PRACTITIONER

## 2024-05-23 PROCEDURE — 3078F DIAST BP <80 MM HG: CPT | Mod: CPTII,S$GLB,, | Performed by: NURSE PRACTITIONER

## 2024-05-23 NOTE — PROGRESS NOTES
Subjective:   Patient ID:  Erendira Pretty is a 78 y.o. male who presents for evaluation of No chief complaint on file.      HPI    Erendira Pretty is a 78 year old male who presents to Arrhythmia clinic for hospital follow up.     He has PVI ablation with Dr Huerta scheduled for June 24,2024 at [facility].    His current medical conditions include Bilateral carotid stenosis, ED, CAD, PAF on Xarelto, GI bleed, AS, obesity, DM Type II, DELONTE on CPAP.    Recently seen at [facility] ED due to chest tightness with Shortness of breath. .   Has severe iron deficiency anemia noted on labs in May 2024.    Received 2 doses of feraheme in March 2024 with continued anemia.   Additional Feraheme infusion ordered per Hematology but has not been completed yet.     He returns today and states he is doing ok.     Has significant leg claudication symptoms.     Some days he can walk for up to a mile without any leg pains but other days he cannot walk to his mail box. (200 ft).     Tries to be mindful of sodium restrictions.     Denies chest pain or anginal equivalents. Denies orthopnea, PND or abdominal bloating. Reports regular walking without any issues lately. NO leg swelling or claudications. No recent falls, syncope or near syncopal events. Reports compliance with medications and dietary restrictions. NO CNS complaints to suggest TIA or CVA today. No signs of abnormal bleeding.         Past Medical History:   Diagnosis Date    Anticoagulant long-term use     Aortic stenosis     Asthma     Benign prostatic hyperplasia with nocturia     Bilateral carotid artery stenosis 07/21/2021    US CAROTID BILATERAL 07/14/2021 IMPRESSION: Atherosclerosis with suspected stenosis greater than 50% at the right proximal ICA visually. Velocities are discordant and appear improved from prior. Atherosclerosis on the left with no evidence of flow-limiting stenosis greater than 50%.  FINDINGS: Right: Internal Carotid Artery (ICA): Peak systolic velocity  118 cm/sec. 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 09/22/2020    DELONTE on CPAP     Osteoarthritis     Paroxysmal atrial fibrillation     PVD (peripheral vascular disease)     Secondary esophageal varices without bleeding 06/26/2017    Type 2 diabetes mellitus        Past Surgical History:   Procedure Laterality Date    ANGIOGRAM, EXTREMITY, UNILATERAL Left 1/4/2024    Procedure: ANGIOGRAM, EXTREMITY, UNILATERAL- Femoral / SMS Stent, Poss General;  Surgeon: ALEX Alfred III, MD;  Location: [facility] OR [address] [place] [address];  Service: Vascular;  Laterality: Left;  mGy : 2698.78  Gy.cm : 229.88  Contrast : 62ml  Fluro time : 13.8min    CARDIAC CATHETERIZATION      CARDIAC SURGERY      CARPAL TUNNEL RELEASE Left     CATARACT EXTRACTION      CATARACT EXTRACTION W/ INTRAOCULAR LENS  IMPLANT, BILATERAL  2008    CATHETERIZATION OF BOTH LEFT AND RIGHT HEART N/A 11/2/2018    Procedure: CATHETERIZATION, HEART, BOTH LEFT AND RIGHT;  Surgeon: Frank Lazo MD;  Location: [facility] CATH LAB;  Service: Cardiology;  Laterality: N/A;    COLONOSCOPY  8/2013    COLONOSCOPY N/A 5/30/2016    Procedure: COLONOSCOPY;  Surgeon: Anna Tomas MD;  Location: [facility];  Service: Endoscopy;  Laterality: N/A;    COLONOSCOPY N/A 7/17/2019    Procedure: COLONOSCOPY;  Surgeon: David Carter III, MD;  Location: [facility];  Service: Endoscopy;  Laterality: N/A;    COLONOSCOPY N/A 12/14/2023    Procedure: COLONOSCOPY;  Surgeon: Ama Barrera MD;  Location: [facility];   Service: Endoscopy;  Laterality: N/A;    COMPUTED TOMOGRAPHY N/A 9/9/2019    Procedure: CT (COMPUTED TOMOGRAPHY);  Surgeon: [facility] Diagnostic Provider;  Location: [facility] OR;  Service: General;  Laterality: N/A;  Microwave ablation to be done in CT.  Need CRNA and cart    COMPUTED TOMOGRAPHY N/A 1/25/2022    Procedure: Liver Microwave Ablation;  Surgeon: Red Puentes MD;  Location: [facility];  Service: General;  Laterality: N/A;    CORONARY ARTERY BYPASS GRAFT (CABG) N/A 11/5/2018    Procedure: CORONARY ARTERY BYPASS GRAFT (CABG);  Surgeon: Bear De Luna MD;  Location: [facility];  Service: Cardiothoracic;  Laterality: N/A;  TWO VESSEL BYPASS WITH AORTOTOMY AND EXCISION OF ATHEROSCLEROTIC PLAQUE    CORONARY BYPASS GRAFT ANGIOGRAPHY  3/2/2020    Procedure: Bypass graft study;  Surgeon: Cora Cormier MD;  Location: [facility] CATH LAB;  Service: Cardiology;;    ELBOW BURSA SURGERY Right 2010    ENDOSCOPIC HARVEST OF VEIN Left 11/5/2018    Procedure: SURGICAL PROCUREMENT, VEIN, ENDOSCOPIC;  Surgeon: Bear De Luna MD;  Location: [facility];  Service: Cardiothoracic;  Laterality: Left;    ESOPHAGOGASTRODUODENOSCOPY N/A 7/17/2019    Procedure: ESOPHAGOGASTRODUODENOSCOPY (EGD);  Surgeon: David Carter III, MD;  Location: [facility];  Service: Endoscopy;  Laterality: N/A;    ESOPHAGOGASTRODUODENOSCOPY N/A 12/29/2022    Procedure: ESOPHAGOGASTRODUODENOSCOPY (EGD);  Surgeon: Issa Gayle MD;  Location: [facility];  Service: Endoscopy;  Laterality: N/A;    ESOPHAGOGASTRODUODENOSCOPY N/A 1/17/2024    Procedure: EGD (ESOPHAGOGASTRODUODENOSCOPY);  Surgeon: Issa Gayle MD;  Location: [facility] ENDO;  Service: Endoscopy;  Laterality: N/A;    ESOPHAGOGASTRODUODENOSCOPY N/A 4/30/2024    Procedure: EGD (ESOPHAGOGASTRODUODENOSCOPY);  Surgeon: Eder Foley MD;  Location: [facility];  Service: Gastroenterology;  Laterality: N/A;    ETHMOIDECTOMY Bilateral 3/27/2019    Procedure: ETHMOIDECTOMY;  Surgeon: Alejandro Parkinson MD;   Location: [facility] OR;  Service: ENT;  Laterality: Bilateral;    EYE SURGERY      FOOT SURGERY      FRONTAL SINUS OBLITERATION Bilateral 3/27/2019    Procedure: SINUSOTOMY, FRONTAL SINUS, OBLITERATIVE;  Surgeon: Alejandro Parkinson MD;  Location: [facility] OR;  Service: ENT;  Laterality: Bilateral;    FUNCTIONAL ENDOSCOPIC SINUS SURGERY (FESS) Bilateral 3/27/2019    Procedure: FESS (FUNCTIONAL ENDOSCOPIC SINUS SURGERY);  Surgeon: Alejandro Parkinson MD;  Location: [facility] OR;  Service: ENT;  Laterality: Bilateral;  Left Keila bullosa resection     INTRALUMINAL GASTROINTESTINAL TRACT IMAGING VIA CAPSULE N/A 2/2/2024    Procedure: IMAGING PROCEDURE, GI TRACT, INTRALUMINAL, VIA CAPSULE;  Surgeon: Cooper Estrella RN;  Location: [facility] ENDO;  Service: Endoscopy;  Laterality: N/A;    KNEE ARTHROSCOPY W/ MENISCAL REPAIR Left 06/2019    dr boykin    KNEE SURGERY Right     LEFT HEART CATHETERIZATION Left 3/2/2020    Procedure: CATHETERIZATION, HEART, LEFT;  Surgeon: Cora Cormier MD;  Location: [facility] CATH LAB;  Service: Cardiology;  Laterality: Left;    LIVER BIOPSY  01/2022    MAXILLARY ANTROSTOMY Bilateral 3/27/2019    Procedure: MAXILLARY ANTROSTOMY;  Surgeon: Alejandro Parkinson MD;  Location: [facility] OR;  Service: ENT;  Laterality: Bilateral;    NASAL SEPTOPLASTY N/A 3/27/2019    Procedure: SEPTOPLASTY, NOSE;  Surgeon: Alejandro Parkinson MD;  Location: [facility] OR;  Service: ENT;  Laterality: N/A;    RECTAL SURGERY  2012    STENT, SUPERIOR MESENTERIC ARTERY Left 1/4/2024    Procedure: STENT, SUPERIOR MESENTERIC ARTERY;  Surgeon: ALEX Alfred III, MD;  Location: [address];  Service: Vascular;  Laterality: Left;    TRANSURETHRAL RESECTION OF PROSTATE (TURP) WITHOUT USE OF LASER N/A 6/19/2018    Procedure: TURP, WITHOUT USING LASER;  Surgeon: Cooper Cordova IV, MD;  Location: [facility] OR;  Service: Urology;  Laterality: N/A;    TRIGGER FINGER RELEASE Left        Social History     Tobacco Use    Smoking status: Former     Current packs/day: 0.00     Average packs/day:  0.5 packs/day for 4.0 years (2.0 ttl pk-yrs)     Types: Cigarettes     Start date: 1968     Quit date: 1972     Years since quittin.9    Smokeless tobacco: Former     Types: Chew     Quit date: 1976   Substance Use Topics    Alcohol use: Yes     Alcohol/week: 2.0 standard drinks of alcohol     Types: 2 Cans of beer per week    Drug use: No       Family History   Problem Relation Name Age of Onset    Heart disease Mother      Heart disease Father      Heart disease Brother      Colon cancer Neg Hx         Wt Readings from Last 3 Encounters:   24 101.3 kg (223 lb 5.2 oz)   24 100.1 kg (220 lb 10.9 oz)   24 100.6 kg (221 lb 12.8 oz)     Temp Readings from Last 3 Encounters:   24 97.5 °F (36.4 °C) (Tympanic)   24 98.4 °F (36.9 °C) (Oral)   24 98 °F (36.7 °C) (Tympanic)     BP Readings from Last 3 Encounters:   24 122/60   24 128/72   24 130/60     Pulse Readings from Last 3 Encounters:   24 84   24 101   24 72       Current Outpatient Medications on File Prior to Visit   Medication Sig Dispense Refill    ACCU-CHEK GRACE PLUS METER Misc AS DIRECTED 1 each 0    albuterol (VENTOLIN HFA) 90 mcg/actuation inhaler Inhale 2 puffs into the lungs every 6 (six) hours as needed for Wheezing or Shortness of Breath. Rescue 18 g 3    aspirin (ECOTRIN) 81 MG EC tablet Take 1 tablet (81 mg total) by mouth once daily. 90 tablet 3    blood sugar diagnostic (ACCU-CHEK GRACE PLUS TEST STRP) Strp TEST THREE TIMES A  strip 11    budesonide-formoterol 160-4.5 mcg (SYMBICORT) 160-4.5 mcg/actuation HFAA INHALE 2 PUFFS INTO THE LUNGS TWO TIMES A DAY. 10.2 g 11    carvediloL (COREG) 12.5 MG tablet Take 2 tablets (25 mg total) by mouth 2 (two) times daily with meals. 60 tablet 2    famotidine (PEPCID) 40 MG tablet Take 40 mg by mouth.      finasteride (PROSCAR) 5 mg tablet TAKE 1 TABLET BY MOUTH once daily (Patient taking differently: Take 5 mg by  "mouth nightly.) 90 tablet 2    furosemide (LASIX) 40 MG tablet Take 1 tablet (40 mg total) by mouth 2 (two) times daily. 60 tablet 11    GLUCOSAMINE HCL/CHONDR ROSARIO A NA (OSTEO BI-FLEX ORAL) Take 1 tablet by mouth 2 (two) times daily.       inclisiran (LEQVIO) 284 mg/1.5 mL Syrg subcutaneous injection Inject 1.5 mLs (284 mg total) into the skin every 3 (three) months. 1.5 mL 1    inclisiran (LEQVIO) 284 mg/1.5 mL Syrg subcutaneous injection Inject 1.5 mLs (284 mg total) into the skin every 6 (six) months. 1.5 mL 1    insulin (LANTUS SOLOSTAR U-100 INSULIN) glargine 100 units/mL SubQ pen Inject 44 Units into the skin every evening. 45 mL 1    JARDIANCE 25 mg tablet TAKE 1 TABLET BY MOUTH EVERY DAY 90 tablet 0    lancets (ACCU-CHEK FASTCLIX LANCET DRUM) Misc TEST TWO TIMES A  each 5    levocetirizine (XYZAL) 5 MG tablet Take 1 tablet (5 mg total) by mouth every evening. 30 tablet 11    levothyroxine (SYNTHROID) 300 MCG Tab Take 1 tablet (300 mcg total) by mouth every morning. 90 tablet 3    levothyroxine (SYNTHROID) 50 MCG tablet Take 1 tablet (50 mcg total) by mouth before breakfast. 90 tablet 3    lisinopriL 10 MG tablet Take 1 tablet by mouth once daily 90 tablet 3    metFORMIN (GLUCOPHAGE-XR) 500 MG ER 24hr tablet Take 1 tablet (500 mg total) by mouth 2 (two) times daily with meals. 180 tablet 3    mirabegron (MYRBETRIQ) 25 mg Tb24 ER tablet Take 1 tablet (25 mg total) by mouth once daily. 30 tablet 11    montelukast (SINGULAIR) 10 mg tablet Take 1 tablet (10 mg total) by mouth once daily. 90 tablet 3    pen needle, diabetic (BD ULTRA-FINE MINI PEN NEEDLE) 31 gauge x 3/16" Ndle USE AS DIRECTED 100 each 11    propafenone (RHTHYMOL) 150 MG Tab Take 1 tablet (150 mg total) by mouth every 8 (eight) hours. 90 tablet 11    RABEprazole (ACIPHEX) 20 mg tablet Take 2 tablets (40 mg total) by mouth 2 (two) times daily. 120 tablet 1    ranolazine (RANEXA) 1,000 mg Tb12 Take 1 tablet (1,000 mg total) by mouth 2 (two) " times daily. 60 tablet 2    rivaroxaban (XARELTO) 20 mg Tab Take 1 tablet (20 mg total) by mouth daily with dinner or evening meal. 30 tablet 11    sildenafiL (VIAGRA) 100 MG tablet Take 1/2 to 1 tablet by mouth one hour prior to intercourse. Max 100mg per day 30 tablet 11     Current Facility-Administered Medications on File Prior to Visit   Medication Dose Route Frequency Provider Last Rate Last Admin    lactated ringers infusion   Intravenous Continuous Omaira Theodore MD   New Bag at 09/09/19 1117       Holter monitor - 24 hour    Result Date: 6/7/2023  · Atrial fibrillation  · There were frequent PVCs totalling 719 and averaging 30.44 per hour.  · The longest RR interval was 1900 msec.          Results for orders placed during the hospital encounter of 02/23/24    Echo Saline Bubble? No    Interpretation Summary    Left Ventricle: The left ventricle is normal in size. Normal wall thickness. There is mild concentric hypertrophy. There is normal systolic function with a visually estimated ejection fraction of 60 - 65%. There is normal diastolic function.    Right Ventricle: Normal right ventricular cavity size. Wall thickness is normal. Right ventricle wall motion  is normal. Systolic function is normal.    Left Atrium: Left atrium is severely dilated.    Aortic Valve: There is moderate to severe stenosis. Aortic valve area by VTI is 0.96 cm². Aortic valve peak velocity is 3.76 m/s. Mean gradient is 31 mmHg. The dimensionless index is 0.28. There is mild aortic regurgitation.    Mitral Valve: There is mild regurgitation.    Tricuspid Valve: There is moderate regurgitation.    Pulmonary Artery: There is moderate pulmonary hypertension. The estimated pulmonary artery systolic pressure is 58 mmHg.    IVC/SVC: Intermediate venous pressure at 8 mmHg.    Results for orders placed during the hospital encounter of 08/28/23    Nuclear Stress - Cardiology Interpreted    Interpretation Summary    Normal myocardial  perfusion scan. There is no evidence of myocardial ischemia or infarction.    The gated perfusion images showed an ejection fraction of 62% at rest. The gated perfusion images showed an ejection fraction of 72% post stress.    The ECG portion of the study is negative for ischemia.    The patient reported no chest pain during the stress test.    The Baseline ECG reveals atrial fibrillation.        Results for orders placed or performed during the hospital encounter of 05/11/24   EKG 12-lead    Collection Time: 05/11/24  7:27 AM   Result Value Ref Range    QRS Duration 94 ms    [facility] QTC Calculation 444 ms    Narrative    Test Reason : R06.02,    Vent. Rate : 068 BPM     Atrial Rate : 068 BPM     P-R Int : 384 ms          QRS Dur : 094 ms      QT Int : 418 ms       P-R-T Axes : 064 033 063 degrees     QTc Int : 444 ms    Sinus rhythm with sinus arrhythmia with 1st degree A-V block  Otherwise normal ECG  When compared with ECG of 28-APR-2024 13:43,  No significant change was found  Confirmed by Getachew Serna MD ([id number]) on 5/12/2024 9:36:03 PM    Referred By: [email or web address]   SELF           Confirmed By:Getachew Serna MD         Review of Systems   Constitutional: Positive for malaise/fatigue.   HENT:  Negative for hearing loss and hoarse voice.    Eyes:  Negative for blurred vision and visual disturbance.   Cardiovascular:  Positive for dyspnea on exertion and palpitations. Negative for chest pain, claudication, irregular heartbeat, leg swelling, near-syncope, orthopnea, paroxysmal nocturnal dyspnea and syncope.   Respiratory:  Negative for cough, hemoptysis, shortness of breath, sleep disturbances due to breathing, snoring and wheezing.    Endocrine: Negative for cold intolerance and heat intolerance.   Hematologic/Lymphatic: Does not bruise/bleed easily.   Skin:  Negative for color change, dry skin and nail changes.   Musculoskeletal:  Positive for arthritis and back pain. Negative for joint pain and myalgias.    Gastrointestinal:  Negative for bloating, abdominal pain, constipation, nausea and vomiting.   Genitourinary:  Negative for dysuria, flank pain, hematuria and hesitancy.   Neurological:  Negative for headaches, light-headedness, loss of balance, numbness, paresthesias and weakness.   Psychiatric/Behavioral:  Negative for altered mental status.    Allergic/Immunologic: Negative for environmental allergies.         Objective:/60 (BP Location: Right arm, Patient Position: Sitting)   Pulse 84   Wt 101.3 kg (223 lb 5.2 oz)   SpO2 97%   BMI 34.98 kg/m²      Physical Exam  Vitals and nursing note reviewed.   Constitutional:       General: He is not in acute distress.     Appearance: Normal appearance. He is well-developed. He is not ill-appearing.   HENT:      Head: Normocephalic and atraumatic.      Nose: Nose normal.      Mouth/Throat:      Mouth: Mucous membranes are moist.   Eyes:      Pupils: Pupils are equal, round, and reactive to light.   Neck:      Thyroid: No thyromegaly.      Vascular: No JVD.      Trachea: No tracheal deviation.   Cardiovascular:      Rate and Rhythm: Normal rate and regular rhythm.      Chest Wall: PMI is not displaced.      Pulses: Intact distal pulses.           Radial pulses are 2+ on the right side and 2+ on the left side.        Dorsalis pedis pulses are 2+ on the right side and 2+ on the left side.      Heart sounds: S1 normal and S2 normal. Heart sounds not distant. No murmur heard.  Pulmonary:      Effort: Pulmonary effort is normal. No respiratory distress.      Breath sounds: Normal breath sounds. No wheezing.   Abdominal:      General: Bowel sounds are normal.      Palpations: Abdomen is soft.   Musculoskeletal:         General: No swelling. Normal range of motion.      Cervical back: Full passive range of motion without pain, normal range of motion and neck supple.      Right ankle: No swelling.      Left ankle: No swelling.   Skin:     General: Skin is warm and dry.       Capillary Refill: Capillary refill takes less than 2 seconds.      Nails: There is no clubbing.   Neurological:      General: No focal deficit present.      Mental Status: He is alert and oriented to person, place, and time.   Psychiatric:         Speech: Speech normal.         Behavior: Behavior normal.         Thought Content: Thought content normal.         Judgment: Judgment normal.         Lab Results   Component Value Date    CHOL 154 04/09/2024    CHOL 91 (L) 02/23/2024    CHOL 90 (L) 10/09/2023     Lab Results   Component Value Date    HDL 51 04/09/2024    HDL 43 02/23/2024    HDL 37 (L) 10/09/2023     Lab Results   Component Value Date    LDLCALC 86.2 04/09/2024    LDLCALC 28.2 (L) 02/23/2024    LDLCALC 36.0 (L) 10/09/2023     Lab Results   Component Value Date    TRIG 84 04/09/2024    TRIG 99 02/23/2024    TRIG 85 10/09/2023     Lab Results   Component Value Date    CHOLHDL 33.1 04/09/2024    CHOLHDL 47.3 02/23/2024    CHOLHDL 41.1 10/09/2023       Chemistry        Component Value Date/Time     05/11/2024 0757    K 4.1 05/11/2024 0757     05/11/2024 0757    CO2 27 05/11/2024 0757    BUN 20 05/11/2024 0757    CREATININE 1.2 05/11/2024 0757     05/11/2024 0757        Component Value Date/Time    CALCIUM 8.8 05/11/2024 0757    ALKPHOS 62 05/11/2024 0757    AST 18 05/11/2024 0757    ALT 12 05/11/2024 0757    BILITOT 0.3 05/11/2024 0757    ESTGFRAFRICA >60.0 06/28/2022 0756    EGFRNONAA >60.0 06/28/2022 0756          Lab Results   Component Value Date    TSH 10.817 (H) 04/09/2024     Lab Results   Component Value Date    INR 1.1 02/28/2024    INR 1.0 02/24/2024    INR 1.0 02/23/2024     Lab Results   Component Value Date    WBC 4.61 05/11/2024    HGB 9.5 (L) 05/11/2024    HCT 30.2 (L) 05/11/2024    MCV 84 05/11/2024     05/11/2024          Assessment:      1. Coronary artery disease involving native coronary artery of native heart without angina pectoris    2. Bilateral carotid  artery stenosis    3. PVC (premature ventricular contraction)    4. S/P CABG x 2    5. Paroxysmal atrial fibrillation    6. Iron deficiency anemia due to chronic blood loss    7. Type 2 diabetes mellitus with microalbuminuria, with long-term current use of insulin    8. Class 2 severe obesity due to excess calories with serious comorbidity and body mass index (BMI) of 37.0 to 37.9 in adult    9. DELONTE on CPAP        Plan:     Continue Coreg, ACEi, Rhythmol, Ranexa  Dash diet 2 gm sodium restriction  Increase Lasix to 80 mg daily x 3 days  Repeat ECHO to assess AS severity given worsening MARCUS symptoms.   Decreased stamina and worsening MARCUS- proceed with MPI stress test  RTC after tests     Nicole May, FNP-C [facility]

## 2024-05-27 ENCOUNTER — INFUSION (OUTPATIENT)
Dept: INFUSION THERAPY | Facility: HOSPITAL | Age: 79
End: 2024-05-27
Attending: NURSE PRACTITIONER
Payer: MEDICARE

## 2024-05-27 VITALS
TEMPERATURE: 97 F | RESPIRATION RATE: 18 BRPM | HEART RATE: 70 BPM | SYSTOLIC BLOOD PRESSURE: 118 MMHG | DIASTOLIC BLOOD PRESSURE: 71 MMHG | OXYGEN SATURATION: 98 %

## 2024-05-27 DIAGNOSIS — D50.0 IRON DEFICIENCY ANEMIA DUE TO CHRONIC BLOOD LOSS: Primary | ICD-10-CM

## 2024-05-27 PROCEDURE — 96375 TX/PRO/DX INJ NEW DRUG ADDON: CPT

## 2024-05-27 PROCEDURE — 63600175 PHARM REV CODE 636 W HCPCS: Mod: JZ,JG | Performed by: NURSE PRACTITIONER

## 2024-05-27 PROCEDURE — 25000003 PHARM REV CODE 250: Performed by: NURSE PRACTITIONER

## 2024-05-27 PROCEDURE — 96365 THER/PROPH/DIAG IV INF INIT: CPT

## 2024-05-27 RX ORDER — EPINEPHRINE 0.3 MG/.3ML
0.3 INJECTION SUBCUTANEOUS ONCE AS NEEDED
Status: CANCELLED | OUTPATIENT
Start: 2024-05-27

## 2024-05-27 RX ORDER — DIPHENHYDRAMINE HYDROCHLORIDE 50 MG/ML
50 INJECTION INTRAMUSCULAR; INTRAVENOUS ONCE AS NEEDED
Status: CANCELLED | OUTPATIENT
Start: 2024-05-27

## 2024-05-27 RX ORDER — METHYLPREDNISOLONE SOD SUCC 125 MG
60 VIAL (EA) INJECTION
Status: COMPLETED | OUTPATIENT
Start: 2024-05-27 | End: 2024-05-27

## 2024-05-27 RX ORDER — METHYLPREDNISOLONE SOD SUCC 125 MG
60 VIAL (EA) INJECTION
Status: CANCELLED
Start: 2024-05-27

## 2024-05-27 RX ADMIN — FERUMOXYTOL 510 MG: 510 INJECTION INTRAVENOUS at 07:05

## 2024-05-27 RX ADMIN — METHYLPREDNISOLONE SODIUM SUCCINATE 60 MG: 125 INJECTION, POWDER, FOR SOLUTION INTRAMUSCULAR; INTRAVENOUS at 07:05

## 2024-05-27 NOTE — PLAN OF CARE
Discussed plan of care with pt. Addressed any and ongoing concerns. Pt denies    Problem: Adult Inpatient Plan of Care  Goal: Plan of Care Review  Outcome: Progressing  Flowsheets (Taken 5/27/2024 0711)  Plan of Care Reviewed With: patient  Goal: Absence of Hospital-Acquired Illness or Injury  Outcome: Progressing  Intervention: Identify and Manage Fall Risk  Flowsheets (Taken 5/27/2024 0711)  Safety Promotion/Fall Prevention:   in recliner, wheels locked   room near unit station   nonskid shoes/socks when out of bed  Intervention: Prevent Infection  Flowsheets (Taken 5/27/2024 0711)  Infection Prevention:   equipment surfaces disinfected   hand hygiene promoted  Goal: Optimal Comfort and Wellbeing  Outcome: Progressing  Intervention: Monitor Pain and Promote Comfort  Flowsheets (Taken 5/27/2024 0711)  Pain Management Interventions:   warm blanket provided   quiet environment facilitated   relaxation techniques promoted  Intervention: Provide Person-Centered Care  Flowsheets (Taken 5/27/2024 0711)  Trust Relationship/Rapport:   care explained   questions encouraged   reassurance provided   choices provided   emotional support provided   thoughts/feelings acknowledged   empathic listening provided   questions answered

## 2024-05-28 ENCOUNTER — LAB VISIT (OUTPATIENT)
Dept: LAB | Facility: HOSPITAL | Age: 79
End: 2024-05-28
Attending: PEDIATRICS
Payer: MEDICARE

## 2024-05-28 ENCOUNTER — CLINICAL SUPPORT (OUTPATIENT)
Dept: AUDIOLOGY | Facility: CLINIC | Age: 79
End: 2024-05-28
Payer: MEDICARE

## 2024-05-28 DIAGNOSIS — E03.4 HYPOTHYROIDISM DUE TO ACQUIRED ATROPHY OF THYROID: ICD-10-CM

## 2024-05-28 DIAGNOSIS — H90.3 SENSORINEURAL HEARING LOSS, BILATERAL: Primary | ICD-10-CM

## 2024-05-28 LAB — TSH SERPL DL<=0.005 MIU/L-ACNC: 1.15 UIU/ML (ref 0.4–4)

## 2024-05-28 PROCEDURE — 36415 COLL VENOUS BLD VENIPUNCTURE: CPT | Mod: PO | Performed by: PEDIATRICS

## 2024-05-28 PROCEDURE — 92557 COMPREHENSIVE HEARING TEST: CPT | Mod: S$GLB,,,

## 2024-05-28 PROCEDURE — 92567 TYMPANOMETRY: CPT | Mod: S$GLB,,,

## 2024-05-28 PROCEDURE — 84443 ASSAY THYROID STIM HORMONE: CPT | Performed by: PEDIATRICS

## 2024-05-28 NOTE — PROGRESS NOTES
Erendira Pretty was seen 05/28/2024 for an audiological evaluation. Previous audiological evaluation on 10/15/2019 revealed normal sloping to severe sensorineural hearing loss in both ears. Patient complains of tinnitus in both ears that has gotten worse over time. He has noticed a decline in his hearing, especially in background noise. He has a history of noise exposure.     Otoscopy revealed clear canals with visualization of the tympanic membrane in both ears. Tympanograms were Type A for the right ear and Type A for the left ear. Audiometry revealed normal hearing sensitivity through 500 Hz sloping to a mild to severe sensorineural hearing loss for the right ear, and borderline normal hearing sensitivity through 500 Hz sloping to a mild to moderately-severe sensorineural hearing loss for the left ear. Speech Reception Thresholds were  30 dBHL for the right ear and 30 dBHL for the left ear. Word recognition scores were fair for the right ear and good for the left ear.    Patient was counseled on the above findings.    Recommendations:  ENT Review.   Repeat audiological evaluation in one to two years to monitor hearing, or sooner if needed.  Recommend bilateral amplification. Provided a copy of audiogram and hearing aid information packet. Advised patient to contact insurance regarding hearing aid benefits.

## 2024-06-03 ENCOUNTER — INFUSION (OUTPATIENT)
Dept: INFUSION THERAPY | Facility: HOSPITAL | Age: 79
End: 2024-06-03
Attending: NURSE PRACTITIONER
Payer: MEDICARE

## 2024-06-03 VITALS
SYSTOLIC BLOOD PRESSURE: 120 MMHG | HEART RATE: 63 BPM | OXYGEN SATURATION: 99 % | DIASTOLIC BLOOD PRESSURE: 73 MMHG | TEMPERATURE: 98 F | RESPIRATION RATE: 18 BRPM

## 2024-06-03 DIAGNOSIS — D50.0 IRON DEFICIENCY ANEMIA DUE TO CHRONIC BLOOD LOSS: Primary | ICD-10-CM

## 2024-06-03 PROCEDURE — 63600175 PHARM REV CODE 636 W HCPCS: Mod: JZ,JG | Performed by: NURSE PRACTITIONER

## 2024-06-03 PROCEDURE — 96375 TX/PRO/DX INJ NEW DRUG ADDON: CPT

## 2024-06-03 PROCEDURE — 96374 THER/PROPH/DIAG INJ IV PUSH: CPT

## 2024-06-03 PROCEDURE — 25000003 PHARM REV CODE 250: Performed by: NURSE PRACTITIONER

## 2024-06-03 RX ORDER — DIPHENHYDRAMINE HYDROCHLORIDE 50 MG/ML
50 INJECTION INTRAMUSCULAR; INTRAVENOUS ONCE AS NEEDED
OUTPATIENT
Start: 2024-06-03

## 2024-06-03 RX ORDER — METHYLPREDNISOLONE SOD SUCC 125 MG
60 VIAL (EA) INJECTION
Status: COMPLETED | OUTPATIENT
Start: 2024-06-03 | End: 2024-06-03

## 2024-06-03 RX ORDER — METHYLPREDNISOLONE SOD SUCC 125 MG
60 VIAL (EA) INJECTION
Start: 2024-06-03

## 2024-06-03 RX ORDER — EPINEPHRINE 0.3 MG/.3ML
0.3 INJECTION SUBCUTANEOUS ONCE AS NEEDED
OUTPATIENT
Start: 2024-06-03

## 2024-06-03 RX ADMIN — FERUMOXYTOL 510 MG: 510 INJECTION INTRAVENOUS at 07:06

## 2024-06-03 RX ADMIN — METHYLPREDNISOLONE SODIUM SUCCINATE 60 MG: 125 INJECTION, POWDER, FOR SOLUTION INTRAMUSCULAR; INTRAVENOUS at 07:06

## 2024-06-03 NOTE — PLAN OF CARE
Patient tolerated feraheme well today; no adverse reaction noted.  POC reviewed with pt.  NAD noted upon discharge.   Has f/u appt(s) scheduled per MD request.    Problem: Adult Inpatient Plan of Care  Goal: Plan of Care Review  Outcome: Progressing  Goal: Absence of Hospital-Acquired Illness or Injury  Outcome: Progressing  Intervention: Identify and Manage Fall Risk  Flowsheets (Taken 6/3/2024 0729)  Safety Promotion/Fall Prevention: in recliner, wheels locked  Goal: Optimal Comfort and Wellbeing  Outcome: Progressing  Intervention: Provide Person-Centered Care  Flowsheets (Taken 6/3/2024 0729)  Trust Relationship/Rapport:   reassurance provided   choices provided   thoughts/feelings acknowledged   care explained   emotional support provided   empathic listening provided   questions answered   questions encouraged

## 2024-06-06 ENCOUNTER — LAB VISIT (OUTPATIENT)
Dept: LAB | Facility: HOSPITAL | Age: 79
End: 2024-06-06
Attending: INTERNAL MEDICINE
Payer: MEDICARE

## 2024-06-06 DIAGNOSIS — K74.69 OTHER CIRRHOSIS OF LIVER: ICD-10-CM

## 2024-06-06 LAB
ALBUMIN SERPL BCP-MCNC: 3.7 G/DL (ref 3.5–5.2)
ALP SERPL-CCNC: 57 U/L (ref 55–135)
ALT SERPL W/O P-5'-P-CCNC: 16 U/L (ref 10–44)
ANION GAP SERPL CALC-SCNC: 9 MMOL/L (ref 8–16)
AST SERPL-CCNC: 21 U/L (ref 10–40)
BASOPHILS # BLD AUTO: 0.02 K/UL (ref 0–0.2)
BASOPHILS NFR BLD: 0.4 % (ref 0–1.9)
BILIRUB SERPL-MCNC: 0.3 MG/DL (ref 0.1–1)
BUN SERPL-MCNC: 21 MG/DL (ref 8–23)
CALCIUM SERPL-MCNC: 9.3 MG/DL (ref 8.7–10.5)
CHLORIDE SERPL-SCNC: 106 MMOL/L (ref 95–110)
CO2 SERPL-SCNC: 27 MMOL/L (ref 23–29)
CREAT SERPL-MCNC: 1.3 MG/DL (ref 0.5–1.4)
DIFFERENTIAL METHOD BLD: ABNORMAL
EOSINOPHIL # BLD AUTO: 0.1 K/UL (ref 0–0.5)
EOSINOPHIL NFR BLD: 1.7 % (ref 0–8)
ERYTHROCYTE [DISTWIDTH] IN BLOOD BY AUTOMATED COUNT: 26.2 % (ref 11.5–14.5)
EST. GFR  (NO RACE VARIABLE): 57 ML/MIN/1.73 M^2
GLUCOSE SERPL-MCNC: 114 MG/DL (ref 70–110)
HCT VFR BLD AUTO: 36.2 % (ref 40–54)
HGB BLD-MCNC: 10.9 G/DL (ref 14–18)
IMM GRANULOCYTES # BLD AUTO: 0.05 K/UL (ref 0–0.04)
IMM GRANULOCYTES NFR BLD AUTO: 1 % (ref 0–0.5)
INR PPP: 1 (ref 0.8–1.2)
LYMPHOCYTES # BLD AUTO: 1.1 K/UL (ref 1–4.8)
LYMPHOCYTES NFR BLD: 21.9 % (ref 18–48)
MCH RBC QN AUTO: 27.3 PG (ref 27–31)
MCHC RBC AUTO-ENTMCNC: 30.1 G/DL (ref 32–36)
MCV RBC AUTO: 91 FL (ref 82–98)
MONOCYTES # BLD AUTO: 0.5 K/UL (ref 0.3–1)
MONOCYTES NFR BLD: 9.4 % (ref 4–15)
NEUTROPHILS # BLD AUTO: 3.1 K/UL (ref 1.8–7.7)
NEUTROPHILS NFR BLD: 65.6 % (ref 38–73)
NRBC BLD-RTO: 0 /100 WBC
PLATELET # BLD AUTO: 186 K/UL (ref 150–450)
PMV BLD AUTO: 10.5 FL (ref 9.2–12.9)
POTASSIUM SERPL-SCNC: 4.4 MMOL/L (ref 3.5–5.1)
PROT SERPL-MCNC: 7 G/DL (ref 6–8.4)
PROTHROMBIN TIME: 11.5 SEC (ref 9–12.5)
RBC # BLD AUTO: 4 M/UL (ref 4.6–6.2)
SODIUM SERPL-SCNC: 142 MMOL/L (ref 136–145)
WBC # BLD AUTO: 4.79 K/UL (ref 3.9–12.7)

## 2024-06-06 PROCEDURE — 85025 COMPLETE CBC W/AUTO DIFF WBC: CPT | Performed by: INTERNAL MEDICINE

## 2024-06-06 PROCEDURE — 80053 COMPREHEN METABOLIC PANEL: CPT | Performed by: INTERNAL MEDICINE

## 2024-06-06 PROCEDURE — 85610 PROTHROMBIN TIME: CPT | Performed by: INTERNAL MEDICINE

## 2024-06-06 PROCEDURE — 36415 COLL VENOUS BLD VENIPUNCTURE: CPT | Mod: PO | Performed by: INTERNAL MEDICINE

## 2024-06-11 ENCOUNTER — HOSPITAL ENCOUNTER (OUTPATIENT)
Dept: PULMONOLOGY | Facility: HOSPITAL | Age: 79
Discharge: HOME | End: 2024-06-11
Attending: NURSE PRACTITIONER
Payer: MEDICARE

## 2024-06-11 ENCOUNTER — HOSPITAL ENCOUNTER (OUTPATIENT)
Dept: RADIOLOGY | Facility: HOSPITAL | Age: 79
Discharge: HOME | End: 2024-06-11
Attending: NURSE PRACTITIONER
Payer: MEDICARE

## 2024-06-11 ENCOUNTER — TELEPHONE (OUTPATIENT)
Dept: CARDIOLOGY | Facility: CLINIC | Age: 79
End: 2024-06-11
Payer: MEDICARE

## 2024-06-11 ENCOUNTER — HOSPITAL ENCOUNTER (OUTPATIENT)
Dept: CARDIOLOGY | Facility: HOSPITAL | Age: 79
Discharge: HOME | End: 2024-06-11
Attending: NURSE PRACTITIONER
Payer: MEDICARE

## 2024-06-11 VITALS
SYSTOLIC BLOOD PRESSURE: 117 MMHG | HEIGHT: 67 IN | WEIGHT: 223 LBS | BODY MASS INDEX: 35 KG/M2 | HEART RATE: 63 BPM | DIASTOLIC BLOOD PRESSURE: 66 MMHG

## 2024-06-11 VITALS
SYSTOLIC BLOOD PRESSURE: 122 MMHG | DIASTOLIC BLOOD PRESSURE: 60 MMHG | HEIGHT: 67 IN | BODY MASS INDEX: 35 KG/M2 | WEIGHT: 223 LBS

## 2024-06-11 DIAGNOSIS — R06.09 DOE (DYSPNEA ON EXERTION): ICD-10-CM

## 2024-06-11 DIAGNOSIS — I25.10 CORONARY ARTERY DISEASE INVOLVING NATIVE CORONARY ARTERY OF NATIVE HEART WITHOUT ANGINA PECTORIS: Chronic | ICD-10-CM

## 2024-06-11 DIAGNOSIS — N40.1 BENIGN PROSTATIC HYPERPLASIA WITH NOCTURIA: ICD-10-CM

## 2024-06-11 DIAGNOSIS — I35.0 NONRHEUMATIC AORTIC VALVE STENOSIS: ICD-10-CM

## 2024-06-11 DIAGNOSIS — R35.1 BENIGN PROSTATIC HYPERPLASIA WITH NOCTURIA: ICD-10-CM

## 2024-06-11 DIAGNOSIS — D50.0 IRON DEFICIENCY ANEMIA DUE TO CHRONIC BLOOD LOSS: ICD-10-CM

## 2024-06-11 LAB
AORTIC ROOT ANNULUS: 3.52 CM
ASCENDING AORTA: 3.6 CM
AV INDEX (PROSTH): 0.36
AV MEAN GRADIENT: 20 MMHG
AV PEAK GRADIENT: 32 MMHG
AV REGURGITATION PRESSURE HALF TIME: 956.26 MS
AV VALVE AREA BY VELOCITY RATIO: 1.12 CM²
AV VALVE AREA: 1.18 CM²
AV VELOCITY RATIO: 0.34
BSA FOR ECHO PROCEDURE: 2.19 M2
CV ECHO LV RWT: 0.65 CM
CV STRESS BASE HR: 64 BPM
DIASTOLIC BLOOD PRESSURE: 66 MMHG
DOP CALC AO PEAK VEL: 2.84 M/S
DOP CALC AO VTI: 68.9 CM
DOP CALC LVOT AREA: 3.3 CM2
DOP CALC LVOT DIAMETER: 2.04 CM
DOP CALC LVOT PEAK VEL: 0.97 M/S
DOP CALC LVOT STROKE VOLUME: 81.02 CM3
DOP CALC RVOT PEAK VEL: 0.68 M/S
DOP CALC RVOT VTI: 13.7 CM
DOP CALCLVOT PEAK VEL VTI: 24.8 CM
E WAVE DECELERATION TIME: 246.09 MSEC
E/A RATIO: 0.82
E/E' RATIO: 12.22 M/S
ECHO LV POSTERIOR WALL: 1.42 CM (ref 0.6–1.1)
EJECTION FRACTION- HIGH: 73 %
END DIASTOLIC INDEX-HIGH: 165 ML/M2
END DIASTOLIC INDEX-LOW: 101 ML/M2
END SYSTOLIC INDEX-HIGH: 64 ML/M2
END SYSTOLIC INDEX-LOW: 28 ML/M2
FRACTIONAL SHORTENING: 28 % (ref 28–44)
INTERVENTRICULAR SEPTUM: 1.45 CM (ref 0.6–1.1)
IVC DIAMETER: 1.48 CM
IVRT: 74.22 MSEC
LA MAJOR: 6.2 CM
LA MINOR: 5.61 CM
LA WIDTH: 4.5 CM
LEFT ATRIUM SIZE: 4.08 CM
LEFT ATRIUM VOLUME INDEX: 43.4 ML/M2
LEFT ATRIUM VOLUME: 91.92 CM3
LEFT INTERNAL DIMENSION IN SYSTOLE: 3.13 CM (ref 2.1–4)
LEFT VENTRICLE DIASTOLIC VOLUME INDEX: 40.6 ML/M2
LEFT VENTRICLE DIASTOLIC VOLUME: 86.07 ML
LEFT VENTRICLE MASS INDEX: 116 G/M2
LEFT VENTRICLE SYSTOLIC VOLUME INDEX: 18.3 ML/M2
LEFT VENTRICLE SYSTOLIC VOLUME: 38.77 ML
LEFT VENTRICULAR INTERNAL DIMENSION IN DIASTOLE: 4.37 CM (ref 3.5–6)
LEFT VENTRICULAR MASS: 246.91 G
LV LATERAL E/E' RATIO: 10 M/S
LV SEPTAL E/E' RATIO: 15.71 M/S
LVOT MG: 1.94 MMHG
LVOT MV: 0.64 CM/S
MV PEAK A VEL: 1.34 M/S
MV PEAK E VEL: 1.1 M/S
NUC REST EJECTION FRACTION: 71
NUC STRESS EJECTION FRACTION: 72 %
OHS CV CPX 85 PERCENT MAX PREDICTED HEART RATE MALE: 121
OHS CV CPX MAX PREDICTED HEART RATE: 142
OHS CV CPX PATIENT IS FEMALE: 0
OHS CV CPX PATIENT IS MALE: 1
OHS CV CPX PEAK DIASTOLIC BLOOD PRESSURE: 59 MMHG
OHS CV CPX PEAK HEAR RATE: 83 BPM
OHS CV CPX PEAK RATE PRESSURE PRODUCT: NORMAL
OHS CV CPX PEAK SYSTOLIC BLOOD PRESSURE: 146 MMHG
OHS CV CPX PERCENT MAX PREDICTED HEART RATE ACHIEVED: 58
OHS CV CPX RATE PRESSURE PRODUCT PRESENTING: 7488
PISA AR MAX VEL: 3.8 M/S
PISA TR MAX VEL: 2.76 M/S
PV MEAN GRADIENT: 1 MMHG
RA MAJOR: 5.45 CM
RA PRESSURE ESTIMATED: 3 MMHG
RA WIDTH: 4.1 CM
RETIRED EF AND QEF - SEE NOTES: 59 %
RV TB RVSP: 6 MMHG
STJ: 3.68 CM
SYSTOLIC BLOOD PRESSURE: 117 MMHG
TDI LATERAL: 0.11 M/S
TDI SEPTAL: 0.07 M/S
TDI: 0.09 M/S
TR MAX PG: 30 MMHG
TRICUSPID ANNULAR PLANE SYSTOLIC EXCURSION: 1.38 CM
TV REST PULMONARY ARTERY PRESSURE: 33 MMHG
Z-SCORE OF LEFT VENTRICULAR DIMENSION IN END DIASTOLE: -4.27
Z-SCORE OF LEFT VENTRICULAR DIMENSION IN END SYSTOLE: -2.11

## 2024-06-11 PROCEDURE — 93306 TTE W/DOPPLER COMPLETE: CPT | Mod: 26,,, | Performed by: INTERNAL MEDICINE

## 2024-06-11 PROCEDURE — A9502 TC99M TETROFOSMIN: HCPCS | Performed by: NURSE PRACTITIONER

## 2024-06-11 PROCEDURE — 93306 TTE W/DOPPLER COMPLETE: CPT

## 2024-06-11 PROCEDURE — 78452 HT MUSCLE IMAGE SPECT MULT: CPT

## 2024-06-11 PROCEDURE — 93018 CV STRESS TEST I&R ONLY: CPT | Mod: ,,, | Performed by: INTERNAL MEDICINE

## 2024-06-11 PROCEDURE — 78452 HT MUSCLE IMAGE SPECT MULT: CPT | Mod: 26,,, | Performed by: INTERNAL MEDICINE

## 2024-06-11 PROCEDURE — 63600175 PHARM REV CODE 636 W HCPCS: Performed by: NURSE PRACTITIONER

## 2024-06-11 PROCEDURE — 93016 CV STRESS TEST SUPVJ ONLY: CPT | Mod: ,,, | Performed by: INTERNAL MEDICINE

## 2024-06-11 RX ORDER — REGADENOSON 0.08 MG/ML
0.4 INJECTION, SOLUTION INTRAVENOUS
Status: COMPLETED | OUTPATIENT
Start: 2024-06-11 | End: 2024-06-11

## 2024-06-11 RX ORDER — FINASTERIDE 5 MG/1
5 TABLET, FILM COATED ORAL
Qty: 90 TABLET | Refills: 3 | Status: SHIPPED | OUTPATIENT
Start: 2024-06-11

## 2024-06-11 RX ADMIN — TETROFOSMIN 10.5 MILLICURIE: 1.38 INJECTION, POWDER, LYOPHILIZED, FOR SOLUTION INTRAVENOUS at 08:06

## 2024-06-11 RX ADMIN — REGADENOSON 0.4 MG: 0.08 INJECTION, SOLUTION INTRAVENOUS at 09:06

## 2024-06-11 RX ADMIN — TETROFOSMIN 34 MILLICURIE: 1.38 INJECTION, POWDER, LYOPHILIZED, FOR SOLUTION INTRAVENOUS at 09:06

## 2024-06-11 NOTE — TELEPHONE ENCOUNTER
Pt was contacted about results:     Stress test negative for ischemia         Pt verbalized understanding with no questions or concerns.      ----- Message from LIDA Palacios sent at 6/11/2024  3:26 PM CDT -----  Stress test negative for ischemia

## 2024-06-11 NOTE — TELEPHONE ENCOUNTER
Refill Decision Note   Erendira Pretty  is requesting a refill authorization.  Brief Assessment and Rationale for Refill:  Approve     Medication Therapy Plan:         Comments:     Note composed:4:08 PM 06/11/2024

## 2024-06-11 NOTE — TELEPHONE ENCOUNTER
No care due was identified.  [facility] Embedded Care Due Messages. Reference number: [id number].   6/11/2024 3:09:04 PM CDT

## 2024-06-12 ENCOUNTER — TELEPHONE (OUTPATIENT)
Dept: CARDIOLOGY | Facility: CLINIC | Age: 79
End: 2024-06-12
Payer: MEDICARE

## 2024-06-12 NOTE — TELEPHONE ENCOUNTER
The patient has been notified of this information and all questions answered.        ----- Message from LIDA Palacios sent at 6/11/2024 10:07 AM CDT -----  Please call patient  ECHO reviewed  Normal Heart function  Grade II DD (stiffness) noted     Severe aortic sclerosis with moderate stenosis  Await stress test results    Thanks  LIDA Salinas

## 2024-06-13 ENCOUNTER — TELEPHONE (OUTPATIENT)
Dept: ELECTROPHYSIOLOGY | Facility: CLINIC | Age: 79
End: 2024-06-13
Payer: MEDICARE

## 2024-06-13 ENCOUNTER — OFFICE VISIT (OUTPATIENT)
Dept: HEPATOLOGY | Facility: CLINIC | Age: 79
End: 2024-06-13
Payer: MEDICARE

## 2024-06-13 VITALS — WEIGHT: 215 LBS | BODY MASS INDEX: 33.74 KG/M2 | HEIGHT: 67 IN

## 2024-06-13 DIAGNOSIS — K74.69 OTHER CIRRHOSIS OF LIVER: Primary | ICD-10-CM

## 2024-06-13 DIAGNOSIS — C22.0 HCC (HEPATOCELLULAR CARCINOMA): ICD-10-CM

## 2024-06-13 PROCEDURE — 1126F AMNT PAIN NOTED NONE PRSNT: CPT | Mod: CPTII,95,, | Performed by: INTERNAL MEDICINE

## 2024-06-13 PROCEDURE — 1160F RVW MEDS BY RX/DR IN RCRD: CPT | Mod: CPTII,95,, | Performed by: INTERNAL MEDICINE

## 2024-06-13 PROCEDURE — 99212 OFFICE O/P EST SF 10 MIN: CPT | Mod: 95,,, | Performed by: INTERNAL MEDICINE

## 2024-06-13 PROCEDURE — 3072F LOW RISK FOR RETINOPATHY: CPT | Mod: CPTII,95,, | Performed by: INTERNAL MEDICINE

## 2024-06-13 PROCEDURE — 1159F MED LIST DOCD IN RCRD: CPT | Mod: CPTII,95,, | Performed by: INTERNAL MEDICINE

## 2024-06-13 PROCEDURE — 1157F ADVNC CARE PLAN IN RCRD: CPT | Mod: CPTII,95,, | Performed by: INTERNAL MEDICINE

## 2024-06-13 RX ORDER — EVOLOCUMAB 140 MG/ML
140 INJECTION, SOLUTION SUBCUTANEOUS
COMMUNITY
Start: 2024-05-17

## 2024-06-13 NOTE — TELEPHONE ENCOUNTER
----- Message from Nadine Martin RN sent at 6/13/2024 12:18 PM CDT -----  Contact: Patient  I have tried to call this nimo several times today and left my number. Can you call him?  ----- Message -----  From: Nicolle Ziegler MA  Sent: 6/13/2024   9:41 AM CDT  To: Nadine Martin RN      ----- Message -----  From: Bethanie Francis MA  Sent: 6/12/2024   4:34 PM CDT  To: Nicolle Ziegler MA      ----- Message -----  From: Alisia Licea  Sent: 6/12/2024   1:27 PM CDT  To: Bradley Leonard Staff    Patient is calling to speak with the nurse regarding upcoming procedure and instructions. Please call patient at [phone]

## 2024-06-13 NOTE — PROGRESS NOTES
Subjective:     Erendira Pretty is here for follow up of cirrhosis    HPI  Since Erendira Pretty's last visit there have been no significant liver issues.  He has been dealing with more cardiac issues for which he is following closely with cardiology. There have been no more issues with GI bleeding or anemia, his Hb is improving.    No evidence of liver decompensation: no ascites, confusion.      Review of Systems    Objective:     Physical Exam    MELD 3.0: 9 at 6/6/2024  7:11 AM  MELD-Na: 9 at 6/6/2024  7:11 AM  Calculated from:  Serum Creatinine: 1.3 mg/dL at 6/6/2024  7:11 AM  Serum Sodium: 142 mmol/L (Using max of 137 mmol/L) at 6/6/2024  7:11 AM  Total Bilirubin: 0.3 mg/dL (Using min of 1 mg/dL) at 6/6/2024  7:11 AM  Serum Albumin: 3.7 g/dL (Using max of 3.5 g/dL) at 6/6/2024  7:11 AM  INR(ratio): 1.0 at 6/6/2024  7:11 AM  Age at listing (hypothetical): 78 years  Sex: Male at 6/6/2024  7:11 AM      WBC   Date Value Ref Range Status   06/06/2024 4.79 3.90 - 12.70 K/uL Final     Hemoglobin   Date Value Ref Range Status   06/06/2024 10.9 (L) 14.0 - 18.0 g/dL Final     POC Hematocrit   Date Value Ref Range Status   11/05/2018 26 (L) 36 - 54 %PCV Final     Hematocrit   Date Value Ref Range Status   06/06/2024 36.2 (L) 40.0 - 54.0 % Final     Platelets   Date Value Ref Range Status   06/06/2024 186 150 - 450 K/uL Final     BUN   Date Value Ref Range Status   06/06/2024 21 8 - 23 mg/dL Final     Creatinine   Date Value Ref Range Status   06/06/2024 1.3 0.5 - 1.4 mg/dL Final     Glucose   Date Value Ref Range Status   06/06/2024 114 (H) 70 - 110 mg/dL Final     Calcium   Date Value Ref Range Status   06/06/2024 9.3 8.7 - 10.5 mg/dL Final     Sodium   Date Value Ref Range Status   06/06/2024 142 136 - 145 mmol/L Final     Potassium   Date Value Ref Range Status   06/06/2024 4.4 3.5 - 5.1 mmol/L Final     Chloride   Date Value Ref Range Status   06/06/2024 106 95 - 110 mmol/L Final     Magnesium   Date Value Ref Range  Status   02/24/2024 2.0 1.6 - 2.6 mg/dL Final     AST   Date Value Ref Range Status   06/06/2024 21 10 - 40 U/L Final     ALT   Date Value Ref Range Status   06/06/2024 16 10 - 44 U/L Final     Alkaline Phosphatase   Date Value Ref Range Status   06/06/2024 57 55 - 135 U/L Final     Total Bilirubin   Date Value Ref Range Status   06/06/2024 0.3 0.1 - 1.0 mg/dL Final     Comment:     For infants and newborns, interpretation of results should be based  on gestational age, weight and in agreement with clinical  observations.    Premature Infant recommended reference ranges:  Up to 24 hours.............<8.0 mg/dL  Up to 48 hours............<12.0 mg/dL  3-5 days..................<15.0 mg/dL  6-29 days.................<15.0 mg/dL       Albumin   Date Value Ref Range Status   06/06/2024 3.7 3.5 - 5.2 g/dL Final     INR   Date Value Ref Range Status   06/06/2024 1.0 0.8 - 1.2 Final     Comment:     Coumadin Therapy:  2.0 - 3.0 for INR for all indicators except mechanical heart valves  and antiphospholipid syndromes which should use 2.5 - 3.5.           Assessment/Plan:     1. Other cirrhosis of liver    2. HCC (hepatocellular carcinoma)      Erendira Pretty is a 79 y.o. male withFollow-up      Cirrhosis- low MELD, well compensated  -Continue to monitor MELD labs  -Continue variceal screening- on beta blocker    HCC-no recurrence  -Continue alternating US with MRI    RTC in 6 months with preclinic labs and imaging, looks like these are already scheduled    The patient location is: [place]  The chief complaint leading to consultation is: See above in note    Visit type: audiovisual    Face to Face time with patient: 10 minutes  20 minutes of total time spent on the encounter, which includes face to face time and non-face to face time preparing to see the patient (eg, review of tests), Obtaining and/or reviewing separately obtained history, Documenting clinical information in the electronic or other health record,  Independently interpreting results (not separately reported) and communicating results to the patient/family/caregiver, or Care coordination (not separately reported).         Each patient to whom he or she provides medical services by telemedicine is:  (1) informed of the relationship between the physician and patient and the respective role of any other health care provider with respect to management of the patient; and (2) notified that he or she may decline to receive medical services by telemedicine and may withdraw from such care at any time.      Anna Wood MD

## 2024-06-13 NOTE — TELEPHONE ENCOUNTER
Called patient several times and left messages for patient to return my call in regards to his message

## 2024-06-13 NOTE — TELEPHONE ENCOUNTER
Called patient. He had questions regarding his Xarelto. Patient has been off Xarelto for about a month now. Patient is scheduled for PVI on 6/24/24. Dr. Huerta aware and orders given for patient to start Xarelto 2 weeks prior to the PVI. Patient called today to state that he remains off Xarelto. Patient given instructions to start Xarelto tonight and to continue until PVI procedure. Patient verbalized understanding and will restart the Xarelto tonight.

## 2024-06-17 ENCOUNTER — TELEPHONE (OUTPATIENT)
Dept: PULMONOLOGY | Facility: CLINIC | Age: 79
End: 2024-06-17
Payer: MEDICARE

## 2024-06-17 ENCOUNTER — LAB VISIT (OUTPATIENT)
Dept: LAB | Facility: HOSPITAL | Age: 79
End: 2024-06-17
Attending: INTERNAL MEDICINE
Payer: MEDICARE

## 2024-06-17 DIAGNOSIS — I48.0 PAROXYSMAL ATRIAL FIBRILLATION: ICD-10-CM

## 2024-06-17 DIAGNOSIS — Z01.812 PRE-PROCEDURE LAB EXAM: ICD-10-CM

## 2024-06-17 LAB
ANION GAP SERPL CALC-SCNC: 8 MMOL/L (ref 8–16)
APTT PPP: 33.9 SEC (ref 21–32)
BUN SERPL-MCNC: 21 MG/DL (ref 8–23)
CALCIUM SERPL-MCNC: 9.6 MG/DL (ref 8.7–10.5)
CHLORIDE SERPL-SCNC: 106 MMOL/L (ref 95–110)
CO2 SERPL-SCNC: 26 MMOL/L (ref 23–29)
CREAT SERPL-MCNC: 1.2 MG/DL (ref 0.5–1.4)
ERYTHROCYTE [DISTWIDTH] IN BLOOD BY AUTOMATED COUNT: 26.3 % (ref 11.5–14.5)
EST. GFR  (NO RACE VARIABLE): >60 ML/MIN/1.73 M^2
GLUCOSE SERPL-MCNC: 158 MG/DL (ref 70–110)
HCT VFR BLD AUTO: 34.8 % (ref 40–54)
HGB BLD-MCNC: 11.2 G/DL (ref 14–18)
INR PPP: 1.1 (ref 0.8–1.2)
MCH RBC QN AUTO: 30.2 PG (ref 27–31)
MCHC RBC AUTO-ENTMCNC: 32.2 G/DL (ref 32–36)
MCV RBC AUTO: 94 FL (ref 82–98)
PLATELET # BLD AUTO: 162 K/UL (ref 150–450)
PMV BLD AUTO: 10.5 FL (ref 9.2–12.9)
POTASSIUM SERPL-SCNC: 4.9 MMOL/L (ref 3.5–5.1)
PROTHROMBIN TIME: 12.4 SEC (ref 9–12.5)
RBC # BLD AUTO: 3.71 M/UL (ref 4.6–6.2)
SODIUM SERPL-SCNC: 140 MMOL/L (ref 136–145)
WBC # BLD AUTO: 3.68 K/UL (ref 3.9–12.7)

## 2024-06-17 PROCEDURE — 85730 THROMBOPLASTIN TIME PARTIAL: CPT | Performed by: INTERNAL MEDICINE

## 2024-06-17 PROCEDURE — 36415 COLL VENOUS BLD VENIPUNCTURE: CPT | Mod: PO | Performed by: INTERNAL MEDICINE

## 2024-06-17 PROCEDURE — 80048 BASIC METABOLIC PNL TOTAL CA: CPT | Performed by: INTERNAL MEDICINE

## 2024-06-17 PROCEDURE — 85027 COMPLETE CBC AUTOMATED: CPT | Performed by: INTERNAL MEDICINE

## 2024-06-17 PROCEDURE — 85610 PROTHROMBIN TIME: CPT | Performed by: INTERNAL MEDICINE

## 2024-06-17 NOTE — TELEPHONE ENCOUNTER
----- Message from Karmen Laughlin sent at 6/17/2024  2:00 PM CDT -----  Regarding: PAP Follow Up  Patient received PAP Equipment today and needs to schedule follow up with Dr Michel. It MUST be between 7/17/24 and 9/17/24 for compliance. Please call patient to schedule an appointment.      Thank you,  Karmen Laughlin, CRT, SDS

## 2024-06-21 ENCOUNTER — TELEPHONE (OUTPATIENT)
Dept: ELECTROPHYSIOLOGY | Facility: CLINIC | Age: 79
End: 2024-06-21
Payer: MEDICARE

## 2024-06-21 NOTE — TELEPHONE ENCOUNTER
Spoke to patient    CONFIRMED procedure arrival time of 8am    Reiterated instructions including:  -Directions to check in desk  -NPO after midnight night prior to procedure  -High importance of HOLDING Insulin and Metformin on the morning of the procedure.   -Confirmed compliance of Holding Propafenone. Last dose 6/19.  -Pre-procedure LABS completed and reviewed.  -Confirmed absence or presence of implanted device/stimulator No devices or stimulators. Does have an Aortic stent graft.  -Confirmed no fever, cough, or shortness of breath in the past 30 days  -Confirmed no redness, rash, irritation, or yeast infection to groin area.   -Confirmed no GLP-1 medications.    Patient verbalized understanding of above and appreciated the call.

## 2024-06-24 ENCOUNTER — ANESTHESIA EVENT (OUTPATIENT)
Dept: MEDSURG UNIT | Facility: HOSPITAL | Age: 79
End: 2024-06-24
Payer: MEDICARE

## 2024-06-24 ENCOUNTER — ANESTHESIA (OUTPATIENT)
Dept: MEDSURG UNIT | Facility: HOSPITAL | Age: 79
End: 2024-06-24
Payer: MEDICARE

## 2024-06-24 ENCOUNTER — HOSPITAL ENCOUNTER (OUTPATIENT)
Dept: CARDIOLOGY | Facility: HOSPITAL | Age: 79
Discharge: HOME | End: 2024-06-24
Attending: INTERNAL MEDICINE
Payer: MEDICARE

## 2024-06-24 ENCOUNTER — HOSPITAL ENCOUNTER (OUTPATIENT)
Facility: HOSPITAL | Age: 79
Discharge: HOME | End: 2024-06-24
Attending: INTERNAL MEDICINE | Admitting: INTERNAL MEDICINE
Payer: MEDICARE

## 2024-06-24 VITALS
WEIGHT: 215 LBS | HEIGHT: 67 IN | TEMPERATURE: 99 F | DIASTOLIC BLOOD PRESSURE: 92 MMHG | HEART RATE: 62 BPM | RESPIRATION RATE: 16 BRPM | OXYGEN SATURATION: 97 % | SYSTOLIC BLOOD PRESSURE: 146 MMHG | BODY MASS INDEX: 33.74 KG/M2

## 2024-06-24 VITALS
HEIGHT: 67 IN | HEART RATE: 68 BPM | WEIGHT: 215 LBS | BODY MASS INDEX: 33.74 KG/M2 | DIASTOLIC BLOOD PRESSURE: 73 MMHG | SYSTOLIC BLOOD PRESSURE: 148 MMHG

## 2024-06-24 DIAGNOSIS — Z86.79 STATUS POST RADIOFREQUENCY ABLATION (RFA) OPERATION FOR ARRHYTHMIA: ICD-10-CM

## 2024-06-24 DIAGNOSIS — I48.91 ATRIAL FIBRILLATION: ICD-10-CM

## 2024-06-24 DIAGNOSIS — I49.9 ARRHYTHMIA: ICD-10-CM

## 2024-06-24 DIAGNOSIS — Z98.890 STATUS POST RADIOFREQUENCY ABLATION (RFA) OPERATION FOR ARRHYTHMIA: ICD-10-CM

## 2024-06-24 DIAGNOSIS — I48.0 PAROXYSMAL ATRIAL FIBRILLATION: ICD-10-CM

## 2024-06-24 DIAGNOSIS — K21.9 GASTROESOPHAGEAL REFLUX DISEASE WITHOUT ESOPHAGITIS: ICD-10-CM

## 2024-06-24 LAB
ASCENDING AORTA: 3.5 CM
BSA FOR ECHO PROCEDURE: 2.15 M2
LAA PV: 50 CM/S
OHS QRS DURATION: 84 MS
OHS QRS DURATION: 88 MS
OHS QTC CALCULATION: 449 MS
OHS QTC CALCULATION: 472 MS
POCT GLUCOSE: 73 MG/DL (ref 70–110)
POCT GLUCOSE: 76 MG/DL (ref 70–110)
POCT GLUCOSE: 86 MG/DL (ref 70–110)
SINUS: 3 CM
STJ: 3.3 CM

## 2024-06-24 PROCEDURE — C2630 CATH, EP, COOL-TIP: HCPCS | Performed by: INTERNAL MEDICINE

## 2024-06-24 PROCEDURE — C1730 CATH, EP, 19 OR FEW ELECT: HCPCS | Performed by: INTERNAL MEDICINE

## 2024-06-24 PROCEDURE — 93656 COMPRE EP EVAL ABLTJ ATR FIB: CPT | Performed by: INTERNAL MEDICINE

## 2024-06-24 PROCEDURE — C1753 CATH, INTRAVAS ULTRASOUND: HCPCS | Performed by: INTERNAL MEDICINE

## 2024-06-24 PROCEDURE — C1766 INTRO/SHEATH,STRBLE,NON-PEEL: HCPCS | Performed by: INTERNAL MEDICINE

## 2024-06-24 PROCEDURE — 93325 DOPPLER ECHO COLOR FLOW MAPG: CPT

## 2024-06-24 PROCEDURE — 93010 ELECTROCARDIOGRAM REPORT: CPT | Mod: ,,, | Performed by: INTERNAL MEDICINE

## 2024-06-24 PROCEDURE — 37000008 HC ANESTHESIA 1ST 15 MINUTES: Performed by: INTERNAL MEDICINE

## 2024-06-24 PROCEDURE — 93656 COMPRE EP EVAL ABLTJ ATR FIB: CPT | Mod: ,,, | Performed by: INTERNAL MEDICINE

## 2024-06-24 PROCEDURE — C1887 CATHETER, GUIDING: HCPCS | Performed by: INTERNAL MEDICINE

## 2024-06-24 PROCEDURE — 27201037 HC PRESSURE MONITORING SET UP

## 2024-06-24 PROCEDURE — 93312 ECHO TRANSESOPHAGEAL: CPT | Mod: 26,,, | Performed by: INTERNAL MEDICINE

## 2024-06-24 PROCEDURE — 93005 ELECTROCARDIOGRAM TRACING: CPT

## 2024-06-24 PROCEDURE — 63600175 PHARM REV CODE 636 W HCPCS: Performed by: NURSE ANESTHETIST, CERTIFIED REGISTERED

## 2024-06-24 PROCEDURE — 93325 DOPPLER ECHO COLOR FLOW MAPG: CPT | Mod: 26,,, | Performed by: INTERNAL MEDICINE

## 2024-06-24 PROCEDURE — 37000009 HC ANESTHESIA EA ADD 15 MINS: Performed by: INTERNAL MEDICINE

## 2024-06-24 PROCEDURE — 25000003 PHARM REV CODE 250: Performed by: NURSE ANESTHETIST, CERTIFIED REGISTERED

## 2024-06-24 PROCEDURE — C1894 INTRO/SHEATH, NON-LASER: HCPCS | Performed by: INTERNAL MEDICINE

## 2024-06-24 PROCEDURE — 93320 DOPPLER ECHO COMPLETE: CPT | Mod: 26,,, | Performed by: INTERNAL MEDICINE

## 2024-06-24 PROCEDURE — 63600175 PHARM REV CODE 636 W HCPCS: Performed by: INTERNAL MEDICINE

## 2024-06-24 PROCEDURE — 27201423 OPTIME MED/SURG SUP & DEVICES STERILE SUPPLY: Performed by: INTERNAL MEDICINE

## 2024-06-24 PROCEDURE — 25000003 PHARM REV CODE 250: Performed by: INTERNAL MEDICINE

## 2024-06-24 RX ORDER — OXYCODONE AND ACETAMINOPHEN 5; 325 MG/1; MG/1
1 TABLET ORAL
Status: DISCONTINUED | OUTPATIENT
Start: 2024-06-24 | End: 2024-06-24 | Stop reason: HOSPADM

## 2024-06-24 RX ORDER — HEPARIN SOD,PORCINE/0.9 % NACL 1000/500ML
INTRAVENOUS SOLUTION INTRAVENOUS
Status: DISCONTINUED | OUTPATIENT
Start: 2024-06-24 | End: 2024-06-24 | Stop reason: HOSPADM

## 2024-06-24 RX ORDER — PHENYLEPHRINE HCL IN 0.9% NACL 1 MG/10 ML
SYRINGE (ML) INTRAVENOUS
Status: DISCONTINUED | OUTPATIENT
Start: 2024-06-24 | End: 2024-06-24

## 2024-06-24 RX ORDER — HYDROMORPHONE HYDROCHLORIDE 1 MG/ML
0.2 INJECTION, SOLUTION INTRAMUSCULAR; INTRAVENOUS; SUBCUTANEOUS EVERY 5 MIN PRN
Status: DISCONTINUED | OUTPATIENT
Start: 2024-06-24 | End: 2024-06-24 | Stop reason: HOSPADM

## 2024-06-24 RX ORDER — PROTAMINE SULFATE 10 MG/ML
INJECTION, SOLUTION INTRAVENOUS
Status: DISCONTINUED | OUTPATIENT
Start: 2024-06-24 | End: 2024-06-24

## 2024-06-24 RX ORDER — PROCHLORPERAZINE EDISYLATE 5 MG/ML
5 INJECTION INTRAMUSCULAR; INTRAVENOUS EVERY 30 MIN PRN
Status: DISCONTINUED | OUTPATIENT
Start: 2024-06-24 | End: 2024-06-24 | Stop reason: HOSPADM

## 2024-06-24 RX ORDER — HEPARIN SODIUM 10000 [USP'U]/100ML
INJECTION, SOLUTION INTRAVENOUS CONTINUOUS PRN
Status: DISCONTINUED | OUTPATIENT
Start: 2024-06-24 | End: 2024-06-24

## 2024-06-24 RX ORDER — HEPARIN SODIUM 1000 [USP'U]/ML
INJECTION, SOLUTION INTRAVENOUS; SUBCUTANEOUS
Status: DISCONTINUED | OUTPATIENT
Start: 2024-06-24 | End: 2024-06-24

## 2024-06-24 RX ORDER — LIDOCAINE HYDROCHLORIDE 20 MG/ML
INJECTION, SOLUTION EPIDURAL; INFILTRATION; INTRACAUDAL; PERINEURAL
Status: DISCONTINUED | OUTPATIENT
Start: 2024-06-24 | End: 2024-06-24

## 2024-06-24 RX ORDER — ROCURONIUM BROMIDE 10 MG/ML
INJECTION, SOLUTION INTRAVENOUS
Status: DISCONTINUED | OUTPATIENT
Start: 2024-06-24 | End: 2024-06-24

## 2024-06-24 RX ORDER — NEOSTIGMINE METHYLSULFATE 0.5 MG/ML
INJECTION, SOLUTION INTRAVENOUS
Status: DISCONTINUED | OUTPATIENT
Start: 2024-06-24 | End: 2024-06-24

## 2024-06-24 RX ORDER — PHENYLEPHRINE HYDROCHLORIDE 10 MG/ML
INJECTION INTRAVENOUS
Status: DISCONTINUED | OUTPATIENT
Start: 2024-06-24 | End: 2024-06-24

## 2024-06-24 RX ORDER — ONDANSETRON HYDROCHLORIDE 2 MG/ML
INJECTION, SOLUTION INTRAVENOUS
Status: DISCONTINUED | OUTPATIENT
Start: 2024-06-24 | End: 2024-06-24

## 2024-06-24 RX ORDER — FENTANYL CITRATE 50 UG/ML
INJECTION, SOLUTION INTRAMUSCULAR; INTRAVENOUS
Status: DISCONTINUED | OUTPATIENT
Start: 2024-06-24 | End: 2024-06-24

## 2024-06-24 RX ORDER — LIDOCAINE HYDROCHLORIDE 20 MG/ML
INJECTION, SOLUTION INFILTRATION; PERINEURAL
Status: DISCONTINUED | OUTPATIENT
Start: 2024-06-24 | End: 2024-06-24 | Stop reason: HOSPADM

## 2024-06-24 RX ORDER — RABEPRAZOLE SODIUM 20 MG/1
40 TABLET, DELAYED RELEASE ORAL DAILY
Qty: 60 TABLET | Refills: 0 | Status: SHIPPED | OUTPATIENT
Start: 2024-06-24 | End: 2024-07-24

## 2024-06-24 RX ORDER — MULTIVITAMIN
1 TABLET ORAL DAILY
COMMUNITY

## 2024-06-24 RX ORDER — ACETAMINOPHEN 325 MG/1
650 TABLET ORAL EVERY 4 HOURS PRN
Status: DISCONTINUED | OUTPATIENT
Start: 2024-06-24 | End: 2024-06-24 | Stop reason: HOSPADM

## 2024-06-24 RX ORDER — MV/FA/DHA/EPA/FISH OIL/SAW/GNK 400MCG-200
COMBINATION PACKAGE (EA) ORAL
COMMUNITY

## 2024-06-24 RX ORDER — PROPOFOL 10 MG/ML
VIAL (ML) INTRAVENOUS
Status: DISCONTINUED | OUTPATIENT
Start: 2024-06-24 | End: 2024-06-24

## 2024-06-24 RX ADMIN — Medication 100 MCG: at 12:06

## 2024-06-24 RX ADMIN — SODIUM CHLORIDE: 0.9 INJECTION, SOLUTION INTRAVENOUS at 11:06

## 2024-06-24 RX ADMIN — LIDOCAINE HYDROCHLORIDE 100 MG: 20 INJECTION, SOLUTION EPIDURAL; INFILTRATION; INTRACAUDAL at 11:06

## 2024-06-24 RX ADMIN — PROPOFOL 150 MG: 10 INJECTION, EMULSION INTRAVENOUS at 11:06

## 2024-06-24 RX ADMIN — NEOSTIGMINE METHYLSULFATE 4 MG: 0.5 INJECTION INTRAVENOUS at 02:06

## 2024-06-24 RX ADMIN — FENTANYL CITRATE 50 MCG: 50 INJECTION, SOLUTION INTRAMUSCULAR; INTRAVENOUS at 11:06

## 2024-06-24 RX ADMIN — PROTAMINE SULFATE 20 MG: 10 INJECTION, SOLUTION INTRAVENOUS at 02:06

## 2024-06-24 RX ADMIN — PHENYLEPHRINE HYDROCHLORIDE 0.25 MCG/KG/MIN: 10 INJECTION INTRAVENOUS at 12:06

## 2024-06-24 RX ADMIN — Medication 200 MCG: at 12:06

## 2024-06-24 RX ADMIN — HEPARIN SODIUM 15000 UNITS: 1000 INJECTION, SOLUTION INTRAVENOUS; SUBCUTANEOUS at 12:06

## 2024-06-24 RX ADMIN — ROCURONIUM BROMIDE 20 MG: 10 INJECTION INTRAVENOUS at 12:06

## 2024-06-24 RX ADMIN — PROTAMINE SULFATE 10 MG: 10 INJECTION, SOLUTION INTRAVENOUS at 02:06

## 2024-06-24 RX ADMIN — GLYCOPYRROLATE 0.6 MG: 0.2 INJECTION INTRAMUSCULAR; INTRAVENOUS at 02:06

## 2024-06-24 RX ADMIN — SODIUM CHLORIDE, SODIUM GLUCONATE, SODIUM ACETATE, POTASSIUM CHLORIDE, MAGNESIUM CHLORIDE, SODIUM PHOSPHATE, DIBASIC, AND POTASSIUM PHOSPHATE: .53; .5; .37; .037; .03; .012; .00082 INJECTION, SOLUTION INTRAVENOUS at 11:06

## 2024-06-24 RX ADMIN — FENTANYL CITRATE 50 MCG: 50 INJECTION, SOLUTION INTRAMUSCULAR; INTRAVENOUS at 01:06

## 2024-06-24 RX ADMIN — PROTAMINE SULFATE 5 MG: 10 INJECTION, SOLUTION INTRAVENOUS at 02:06

## 2024-06-24 RX ADMIN — ONDANSETRON 4 MG: 2 INJECTION INTRAMUSCULAR; INTRAVENOUS at 02:06

## 2024-06-24 RX ADMIN — HEPARIN SODIUM 12000 UNITS/KG/HR: 10000 INJECTION, SOLUTION INTRAVENOUS at 12:06

## 2024-06-24 RX ADMIN — ROCURONIUM BROMIDE 50 MG: 10 INJECTION INTRAVENOUS at 11:06

## 2024-06-24 NOTE — ANESTHESIA PROCEDURE NOTES
Arterial    Diagnosis: atrial fibrillation    Patient location during procedure: done in OR  Timeout: 6/24/2024 11:52 AM  Procedure end time: 6/24/2024 11:53 AM    Staffing  Authorizing Provider: CORTNEY Do MD  Performing Provider: CORTNEY Do MD    Staffing  Performed by: CORTNEY Do MD  Authorized by: CORTNEY Do MD    Anesthesiologist was present at the time of the procedure.  Arterial  Skin Prep: chlorhexidine gluconate and isopropyl alcohol  Local Infiltration: none  Orientation: right  Location: radial    Catheter Size: 20 G  Catheter placement by Anatomical landmarks. Heme positive aspiration all ports. Insertion Attempts: 1  Assessment  Dressing: secured with tape and tegaderm  Patient: Tolerated well

## 2024-06-24 NOTE — PLAN OF CARE
Received pt to floor from home accompanied by son  GORDY x 4. Denies pain or discomfort. Respirations even and unlabored. No distress noted. Pt stable.  Admit assessment complete. IV x 1 placed.  Pt oriented to room and call bell placed within reach.  Will continue to monitor.

## 2024-06-24 NOTE — TRANSFER OF CARE
"Anesthesia Transfer of Care Note    Patient: Erendira Pretty    Procedure(s) Performed: Procedure(s) (LRB):  Ablation atrial fibrillation (N/A)  Transesophageal echo (BERNARD) intra-procedure log documentation (N/A)    Patient location: PACU    Transport from OR: Transported from OR on room air with adequate spontaneous ventilation    Post pain: adequate analgesia    Post assessment: no apparent anesthetic complications and tolerated procedure well    Post vital signs: stable    Level of consciousness: awake, alert and oriented    Nausea/Vomiting: no nausea/vomiting    Complications: none    Transfer of care protocol was followed      Last vitals: Visit Vitals  BP (!) 124/59 (BP Location: Right arm, Patient Position: Lying)   Pulse 69   Temp 36.7 °C (98.1 °F) (Temporal)   Resp 16   Ht 5' 7" (1.702 m)   Wt 97.5 kg (215 lb)   SpO2 99%   BMI 33.67 kg/m²     "

## 2024-06-24 NOTE — ANESTHESIA PROCEDURE NOTES
Arterial    Diagnosis: atrial fibrillation    Patient location during procedure: done in OR  Timeout: 6/24/2024 12:14 PM  Procedure end time: 6/24/2024 12:16 PM    Staffing  Authorizing Provider: CORTNEY Do MD  Performing Provider: CORTNEY Do MD    Staffing  Performed by: CORTNEY Do MD  Authorized by: CORTNEY Do MD    Anesthesiologist was present at the time of the procedure.  Arterial  Skin Prep: chlorhexidine gluconate and isopropyl alcohol  Local Infiltration: none  Orientation: left  Location: radial    Catheter Size: 20 G  Catheter placement by Anatomical landmarks. Heme positive aspiration all ports. Insertion Attempts: 1  Assessment  Dressing: secured with tape and tegaderm  Patient: Tolerated well  Additional Notes  Second arterial line placed in left radial after large discrepancy noted between the right radial arterial pressure and the cuff.

## 2024-06-24 NOTE — CONSULTS
[facility]  BERNARD History and Physical      Erendira Pretty  YOB: 1945  Medical Record Number:  [id number]  Attending Physician:  Horacio Huerta MD   Date of Admission: 6/24/2024       Hospital Day:  0  Current Principal Problem:  <principal problem not specified>    Patient information was obtained from patient and past medical records.  History     Cc: BERNARD for PVI    HPI  Erendira Pretty is a 79 year old male with a PMHx of CAD s/p CABG x2 2020, AF noted 8/23, DM2, HLD, PAD, DELONTE, AS. He had a vascular intervention in his abdomen 1/24 with subsequent GI bleeding later that month. He had been off OAC since the GI bleeding event. Xarelto was restarted. GI bleeding 4/27/24 with no source identified. H/H 11.2/34.8 today. Off Xarelto since discharge, restarted on 6/10. He presents today for PVI ablation with Dr. Huerta.     Today, in good spirits, accompanied by his son     Anticoagulant/antiplatelets: ASA 81 mg/Xarelto 20 mg daily   ECG: Atrial fibrillation 71 bpm   Platelet count: 162 (7 days ago)  INR: 1.1 (7 days ago)    History of stroke:  no  Dysphagia or odynophagia:  no  Liver Disease, esophageal disease, or known varices:  yes (EGD 4/30 normal esophagus)  Upper GI Bleeding:  no  Snoring:  no   Sleep Apnea:  no  Prior neck surgery or radiation:  no  History of anesthetic difficulties:  no  Family history of anesthetic difficulties:  no  Last oral intake: last pm   Able to move neck in all directions:  yes  Use of GLP-1:  no      Medications - Outpatient  Prior to Admission medications    Medication Sig Start Date End Date Taking? Authorizing Provider   ACCU-CHEK GRACE PLUS METER Misc AS DIRECTED 1/31/24   Bhupinder Callaway MD   aspirin (ECOTRIN) 81 MG EC tablet Take 1 tablet (81 mg total) by mouth once daily. 4/10/24 4/10/25  Frank Gallardo MD   blood sugar diagnostic (ACCU-CHEK GRACE PLUS TEST STRP) Strp TEST THREE TIMES A DAY 1/25/24   Bhupinder Callaway  MD Red   budesonide-formoterol 160-4.5 mcg (SYMBICORT) 160-4.5 mcg/actuation HFAA INHALE 2 PUFFS INTO THE LUNGS TWO TIMES A DAY. 5/10/24   Rishi Molina MD   carvediloL (COREG) 12.5 MG tablet Take 2 tablets (25 mg total) by mouth 2 (two) times daily with meals. 5/11/24 5/11/25  Fausto Galaviz MD   famotidine (PEPCID) 40 MG tablet Take 40 mg by mouth. 4/12/24   Provider, Historical   finasteride (PROSCAR) 5 mg tablet TAKE 1 TABLET BY MOUTH once daily 6/11/24   Bhupinder Callaway MD   furosemide (LASIX) 40 MG tablet Take 1 tablet (40 mg total) by mouth 2 (two) times daily. 10/9/23 10/8/24  Frank Gallardo MD   GLUCOSAMINE HCL/CHONDR ROSARIO A NA (OSTEO BI-FLEX ORAL) Take 1 tablet by mouth 2 (two) times daily.     Provider, Historical   inclisiran (LEQVIO) 284 mg/1.5 mL Syrg subcutaneous injection Inject 1.5 mLs (284 mg total) into the skin every 3 (three) months. 5/7/24   Frank Gallardo MD   inclisiran (LEQVIO) 284 mg/1.5 mL Syrg subcutaneous injection Inject 1.5 mLs (284 mg total) into the skin every 6 (six) months. 5/7/24   Frank Gallardo MD   insulin (LANTUS SOLOSTAR U-100 INSULIN) glargine 100 units/mL SubQ pen Inject 44 Units into the skin every evening. 11/20/23   Bhupinder Callaway MD   JARDIANCE 25 mg tablet TAKE 1 TABLET BY MOUTH EVERY DAY 4/1/24   Bhupinder Callaway MD   lancets (ACCU-CHEK FASTCLIX LANCET DRUM) Misc TEST TWO TIMES A DAY 1/29/24   Bhupinder Callaway MD   levocetirizine (XYZAL) 5 MG tablet Take 1 tablet (5 mg total) by mouth every evening. 11/30/22   Tracy Schneider MD   levothyroxine (SYNTHROID) 300 MCG Tab Take 1 tablet (300 mcg total) by mouth every morning. 5/10/24   Bhupinder Callaway MD   levothyroxine (SYNTHROID) 50 MCG tablet Take 1 tablet (50 mcg total) by mouth before breakfast. 4/10/24 4/10/25  Bhupinder Callaway MD   lisinopriL 10 MG tablet Take 1 tablet by mouth once daily 5/9/24   Frank Gallardo MD   metFORMIN (GLUCOPHAGE-XR)  "500 MG ER 24hr tablet Take 1 tablet (500 mg total) by mouth 2 (two) times daily with meals. 10/16/23 10/15/24  Kayleigh Paulino NP   mirabegron (MYRBETRIQ) 25 mg Tb24 ER tablet Take 1 tablet (25 mg total) by mouth once daily. 4/11/24 4/11/25  Cedrick Soler MD   montelukast (SINGULAIR) 10 mg tablet Take 1 tablet (10 mg total) by mouth once daily. 2/16/24 2/15/25  Ann May PA-C   pen needle, diabetic (BD ULTRA-FINE MINI PEN NEEDLE) 31 gauge x 3/16" Ndle USE AS DIRECTED 1/9/24   Bhupinder Callaway MD   propafenone (RHTHYMOL) 150 MG Tab Take 1 tablet (150 mg total) by mouth every 8 (eight) hours. 8/17/23 8/16/24  Frank Gallardo MD   RABEprazole (ACIPHEX) 20 mg tablet Take 2 tablets (40 mg total) by mouth 2 (two) times daily. 4/30/24 6/29/24  Jazzy Cheung NP   ranolazine (RANEXA) 1,000 mg Tb12 Take 1 tablet (1,000 mg total) by mouth 2 (two) times daily. 3/25/24 3/25/25  Fausto Galaviz MD   REPATHA SURECLICK 140 mg/mL PnIj 140 mLs by abdominal subcutaneous route every 14 (fourteen) days. 5/17/24   Provider, Historical   rivaroxaban (XARELTO) 20 mg Tab Take 20 mg by mouth daily with dinner or evening meal.    Provider, Historical   sildenafiL (VIAGRA) 100 MG tablet Take 1/2 to 1 tablet by mouth one hour prior to intercourse. Max 100mg per day 7/5/23   Bhupinder Callaway MD       Medications - Current  Scheduled Meds:  Continuous Infusions:  PRN Meds:.      Allergies  Review of patient's allergies indicates:   Allergen Reactions    Lipitor [atorvastatin] Other (See Comments)     Muscle aches and pains as well as fatigue.     Niacin preparations Other (See Comments)     Causes broken blood vessels and bruises at 4 times normal dose.    Statins-hmg-coa reductase inhibitors Other (See Comments)     Statins cause intolerable myalgias.    Iodinated contrast media Hives     Tachycardia    Omeprazole Hives and Itching    Prilosec [omeprazole magnesium] Hives and Itching       Past " Medical History  Past Medical History:   Diagnosis Date    Anticoagulant long-term use     Aortic stenosis     Asthma     Benign prostatic hyperplasia with nocturia     Bilateral carotid artery stenosis 07/21/2021    US CAROTID BILATERAL 07/14/2021 IMPRESSION: Atherosclerosis with suspected stenosis greater than 50% at the right proximal ICA visually. Velocities are discordant and appear improved from prior. Atherosclerosis on the left with no evidence of flow-limiting stenosis greater than 50%.  FINDINGS: Right: Internal Carotid Artery (ICA): Peak systolic velocity 118 cm/sec. 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 09/22/2020    DELONTE on CPAP     Osteoarthritis     Paroxysmal atrial fibrillation     PVD (peripheral vascular disease)     Secondary esophageal varices without bleeding 06/26/2017    Type 2 diabetes mellitus        Past Surgical History  Past Surgical History:   Procedure Laterality Date    ANGIOGRAM, EXTREMITY, UNILATERAL Left 1/4/2024    Procedure: ANGIOGRAM, EXTREMITY, UNILATERAL- Femoral / SMS Stent, Poss General;  Surgeon: ALEX Alfred III, MD;  Location: [facility] OR [address] [place] [address];  Service: Vascular;  Laterality: Left;  mGy : 2698.78  Gy.cm : 229.88  Contrast : 62ml  Fluro time : 13.8min    CARDIAC CATHETERIZATION      CARDIAC SURGERY      CARPAL TUNNEL RELEASE Left     CATARACT EXTRACTION      CATARACT EXTRACTION W/ INTRAOCULAR LENS  IMPLANT, BILATERAL  2008    CATHETERIZATION OF BOTH LEFT AND RIGHT HEART N/A 11/2/2018     Procedure: CATHETERIZATION, HEART, BOTH LEFT AND RIGHT;  Surgeon: Frank Gallardo MD;  Location: [facility];  Service: Cardiology;  Laterality: N/A;    COLONOSCOPY  8/2013    COLONOSCOPY N/A 5/30/2016    Procedure: COLONOSCOPY;  Surgeon: Anna Tomas MD;  Location: [facility];  Service: Endoscopy;  Laterality: N/A;    COLONOSCOPY N/A 7/17/2019    Procedure: COLONOSCOPY;  Surgeon: David Carter III, MD;  Location: [facility];  Service: Endoscopy;  Laterality: N/A;    COLONOSCOPY N/A 12/14/2023    Procedure: COLONOSCOPY;  Surgeon: Ama Barrera MD;  Location: [facility];  Service: Endoscopy;  Laterality: N/A;    COMPUTED TOMOGRAPHY N/A 9/9/2019    Procedure: CT (COMPUTED TOMOGRAPHY);  Surgeon: Sri Diagnostic Provider;  Location: [facility] OR;  Service: General;  Laterality: N/A;  Microwave ablation to be done in CT.  Need CRNA and cart    COMPUTED TOMOGRAPHY N/A 1/25/2022    Procedure: Liver Microwave Ablation;  Surgeon: Red Puentes MD;  Location: [facility];  Service: General;  Laterality: N/A;    CORONARY ARTERY BYPASS GRAFT (CABG) N/A 11/5/2018    Procedure: CORONARY ARTERY BYPASS GRAFT (CABG);  Surgeon: Baer De Luna MD;  Location: [facility];  Service: Cardiothoracic;  Laterality: N/A;  TWO VESSEL BYPASS WITH AORTOTOMY AND EXCISION OF ATHEROSCLEROTIC PLAQUE    CORONARY BYPASS GRAFT ANGIOGRAPHY  3/2/2020    Procedure: Bypass graft study;  Surgeon: Cora Cormier MD;  Location: [facility];  Service: Cardiology;;    ELBOW BURSA SURGERY Right 2010    ENDOSCOPIC HARVEST OF VEIN Left 11/5/2018    Procedure: SURGICAL PROCUREMENT, VEIN, ENDOSCOPIC;  Surgeon: Bear De Luna MD;  Location: [facility] OR;  Service: Cardiothoracic;  Laterality: Left;    ESOPHAGOGASTRODUODENOSCOPY N/A 7/17/2019    Procedure: ESOPHAGOGASTRODUODENOSCOPY (EGD);  Surgeon: David Carter III, MD;  Location: [facility];  Service: Endoscopy;  Laterality: N/A;    ESOPHAGOGASTRODUODENOSCOPY N/A 12/29/2022    Procedure:  ESOPHAGOGASTRODUODENOSCOPY (EGD);  Surgeon: Issa Gayle MD;  Location: [facility];  Service: Endoscopy;  Laterality: N/A;    ESOPHAGOGASTRODUODENOSCOPY N/A 1/17/2024    Procedure: EGD (ESOPHAGOGASTRODUODENOSCOPY);  Surgeon: Issa Gayle MD;  Location: [facility];  Service: Endoscopy;  Laterality: N/A;    ESOPHAGOGASTRODUODENOSCOPY N/A 4/30/2024    Procedure: EGD (ESOPHAGOGASTRODUODENOSCOPY);  Surgeon: Eder Foley MD;  Location: [facility];  Service: Gastroenterology;  Laterality: N/A;    ETHMOIDECTOMY Bilateral 3/27/2019    Procedure: ETHMOIDECTOMY;  Surgeon: Alejandro Parkinson MD;  Location: [facility] OR;  Service: ENT;  Laterality: Bilateral;    EYE SURGERY      FOOT SURGERY      FRONTAL SINUS OBLITERATION Bilateral 3/27/2019    Procedure: SINUSOTOMY, FRONTAL SINUS, OBLITERATIVE;  Surgeon: Alejandro Parkinson MD;  Location: [facility] OR;  Service: ENT;  Laterality: Bilateral;    FUNCTIONAL ENDOSCOPIC SINUS SURGERY (FESS) Bilateral 3/27/2019    Procedure: FESS (FUNCTIONAL ENDOSCOPIC SINUS SURGERY);  Surgeon: Alejandro Parkinson MD;  Location: [facility] OR;  Service: ENT;  Laterality: Bilateral;  Left Keila bullosa resection     INTRALUMINAL GASTROINTESTINAL TRACT IMAGING VIA CAPSULE N/A 2/2/2024    Procedure: IMAGING PROCEDURE, GI TRACT, INTRALUMINAL, VIA CAPSULE;  Surgeon: Cooper Estrella RN;  Location: [facility] ENDO;  Service: Endoscopy;  Laterality: N/A;    KNEE ARTHROSCOPY W/ MENISCAL REPAIR Left 06/2019    dr boykin    KNEE SURGERY Right     LEFT HEART CATHETERIZATION Left 3/2/2020    Procedure: CATHETERIZATION, HEART, LEFT;  Surgeon: Cora Cormier MD;  Location: [facility]ATH [facility];  Service: Cardiology;  Laterality: Left;    LIVER BIOPSY  01/2022    MAXILLARY ANTROSTOMY Bilateral 3/27/2019    Procedure: MAXILLARY ANTROSTOMY;  Surgeon: Alejandro Parkinson MD;  Location: [facility] OR;  Service: ENT;  Laterality: Bilateral;    NASAL SEPTOPLASTY N/A 3/27/2019    Procedure: SEPTOPLASTY, NOSE;  Surgeon: Alejandro Parkinson MD;  Location: [facility] OR;  Service:  "ENT;  Laterality: N/A;    RECTAL SURGERY      STENT, SUPERIOR MESENTERIC ARTERY Left 2024    Procedure: STENT, SUPERIOR MESENTERIC ARTERY;  Surgeon: ALEX Alfred III, MD;  Location: [address];  Service: Vascular;  Laterality: Left;    TRANSURETHRAL RESECTION OF PROSTATE (TURP) WITHOUT USE OF LASER N/A 2018    Procedure: TURP, WITHOUT USING LASER;  Surgeon: Cooper Cordova IV, MD;  Location: [facility];  Service: Urology;  Laterality: N/A;    TRIGGER FINGER RELEASE Left        Social History  Social History     Socioeconomic History    Marital status:      Spouse name: YAEL    Number of children: 2   Tobacco Use    Smoking status: Former     Current packs/day: 0.00     Average packs/day: 0.5 packs/day for 4.0 years (2.0 ttl pk-yrs)     Types: Cigarettes     Start date: 1968     Quit date: 1972     Years since quittin.0    Smokeless tobacco: Former     Types: Chew     Quit date: 1976   Substance and Sexual Activity    Alcohol use: Not Currently    Drug use: No    Sexual activity: Yes     Partners: Female   Social History Narrative    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       ROS  10 point ROS performed and negative except as stated in HPI     Physical Examination     Vital Signs  24 Hour VS Range    Temp:  [99.3 °F (37.4 °C)]   Pulse:  [72]   Resp:  [16]   BP: (143-148)/(67-73)   SpO2:  [99 %]   No intake or output data in the 24 hours ending 24 0924      Physical Exam:   Constitutional: no acute distress  HEENT: NCAT, EOMI, no scleral icterus  Cardiovascular: Irregularly irregular rate and rhythm   Pulmonary: Normal respiratory effort   Abdomen: nontender, non-distended   Neuro: alert and oriented, no focal deficits  Extremities: warm, no edema   MSK: no deformities  Integument: intact, no rashes     Data       No results for input(s): "WBC", " ""HGB", "HCT", "PLT" in the last 168 hours.       No results for input(s): "PROTIME", "INR" in the last 168 hours.       No results for input(s): "NA", "K", "CL", "CO2", "BUN", "CREATININE", "ANIONGAP", "CALCIUM" in the last 168 hours.       No results for input(s): "PROT", "ALBUMIN", "BILITOT", "ALKPHOS", "AST", "ALT" in the last 168 hours.     No results for input(s): "TROPONINI" in the last 168 hours.     BNP (pg/mL)   Date Value   05/11/2024 299 (H)   03/25/2024 135 (H)   02/23/2024 283 (H)   03/06/2020 67   03/01/2020 143 (H)       No results for input(s): "LABBLOO" in the last 168 hours.     Assessment & Plan     #Atrial fibrillation   -Patient presents for   -on Xarelto for CVA prophylaxis, last dose last night      TTE 06/11/24    Left Ventricle: The left ventricle is normal in size. Increased wall thickness. There is moderate asymmetric hypertrophy. Septal motion is consistent with post-operative status. There is normal systolic function with a visually estimated ejection fraction of 55 - 60%. Grade II diastolic dysfunction.    Right Ventricle: Mild right ventricular enlargement. Wall thickness is normal. Systolic function is mildly reduced.    Left Atrium: Left atrium is moderately dilated.    Aortic Valve: There is severe aortic valve sclerosis. Moderately restricted motion. There is moderate stenosis. Aortic valve area by VTI is 1.18 cm². Aortic valve peak velocity is 2.84 m/s. Mean gradient is 20 mmHg. The dimensionless index is 0.36. There is mild aortic regurgitation.    Mitral Valve: There is mild regurgitation.    Tricuspid Valve: There is moderate regurgitation.    Pulmonary Artery: The estimated pulmonary artery systolic pressure is 33 mmHg.    IVC/SVC: Normal venous pressure at 3 mmHg.      TTE 02/23/24    Left Ventricle: The left ventricle is normal in size. Normal wall thickness. There is mild concentric hypertrophy. There is normal systolic function with a visually estimated ejection fraction " of 60 - 65%. There is normal diastolic function.    Right Ventricle: Normal right ventricular cavity size. Wall thickness is normal. Right ventricle wall motion  is normal. Systolic function is normal.    Left Atrium: Left atrium is severely dilated.    Aortic Valve: There is moderate to severe stenosis. Aortic valve area by VTI is 0.96 cm². Aortic valve peak velocity is 3.76 m/s. Mean gradient is 31 mmHg. The dimensionless index is 0.28. There is mild aortic regurgitation.    Mitral Valve: There is mild regurgitation.    Tricuspid Valve: There is moderate regurgitation.    Pulmonary Artery: There is moderate pulmonary hypertension. The estimated pulmonary artery systolic pressure is 58 mmHg.    IVC/SVC: Intermediate venous pressure at 8 mmHg.       -No absolute contraindications of esophageal stricture, tumor, perforation, laceration,or diverticulum and/or active GI bleed.  -The risks, benefits & alternatives of the procedure were explained to the patient.   -The risks of transesophageal echo include but are not limited to:  Dental trauma, esophageal trauma/perforation, bleeding, laryngospasm/brochospasm, aspiration, sore throat/hoarseness, & dislodgement of the endotracheal tube/nasogastric tube (where applicable).    -The risks of moderate sedation include hypotension, respiratory depression, arrhythmias, bronchospasm, & death.    -Informed consent was obtained. The patient is agreeable to proceed with the procedure and all questions and concerns addressed.    Case was discussed with an attending physician in echocardiography lab prior to procedure.    Samara Mehta PA-C  [facility]

## 2024-06-24 NOTE — NURSING TRANSFER
Nursing Transfer Note      6/24/2024   3:42 PM    Nurse giving handoff:DARWIN Young  Nurse receiving handoff:Poncho    Reason patient is being transferred: post procedure    Transfer To: SSCU 1    Transfer via stretcher    Transfer with cardiac monitoring    Transported by DARWIN Young    Telemetry: Box Number [id number], Rate 60, and Rhythm NSR  Order for Tele Monitor? Yes    Additional Lines: Armenta Catheter    Patient belongings transferred with patient: No    Chart send with patient: Yes    Notified: son    Patient reassessed at: 1542 (date, time)    Upon arrival to floor: cardiac monitor applied, patient oriented to room, call bell in reach, and bed in lowest position

## 2024-06-24 NOTE — NURSING
Bilateral groin sutures removed. Applied gauze and tegaderm to both groins. Removed indwelling hernandez cath. Balloon intact, 9cc water removed. See IO.

## 2024-06-24 NOTE — ANESTHESIA POSTPROCEDURE EVALUATION
Anesthesia Post Evaluation    Patient: Erendira Pretty    Procedure(s) Performed: Procedure(s) (LRB):  Ablation atrial fibrillation (N/A)  Transesophageal echo (BERNARD) intra-procedure log documentation (N/A)    Final Anesthesia Type: general      Patient location during evaluation: Cath Lab  Patient participation: Yes- Able to Participate  Level of consciousness: awake and alert  Post-procedure vital signs: reviewed and stable  Pain management: adequate  Airway patency: patent    PONV status at discharge: No PONV  Anesthetic complications: no      Cardiovascular status: hemodynamically stable  Respiratory status: unassisted, spontaneous ventilation and room air  Hydration status: euvolemic                Vitals Value Taken Time   /66 06/24/24 1531   Temp 36.8 °C (98.2 °F) 06/24/24 1530   Pulse 69 06/24/24 1545   Resp 14 06/24/24 1542   SpO2 94 % 06/24/24 1545   Vitals shown include unfiled device data.      No case tracking events are documented in the log.      Pain/Natalie Score: Natalie Score: 9 (6/24/2024  2:45 PM)

## 2024-06-24 NOTE — H&P
[facility]  Electrophysiology  H&P    Patient Name: Erendira Pretty  MRN: [id number]    Subjective:   Erendira Pretty is a 79 y.o. male with a PMHx significant for CAD s/p CABG x2 2020, moderate AS, GI bleed on xarelto, DM2, PAD, HLD, DELONTE on CPAP, obesity, bilateral carotid stenosis, and persistent atrial fibrillation ([id number]-9-[id number] 4, HASBLED 4) who presents for PVI ablation. Restarted xarelto 2 weeks ago.    Erendira Pretty is here today for a RF-PVI for treatment of atrial fibrillation    Today's ECG: sinus rhythm with PACs  Anticoagulation: rivaroxaban (Last dose 6/23)  TTE: EF 55-60%  Hgb: 11.2  Cr: 1.2  INR: 1.1    History:     Past Medical History:   Diagnosis Date    Anticoagulant long-term use     Aortic stenosis     Asthma     Benign prostatic hyperplasia with nocturia     Bilateral carotid artery stenosis 07/21/2021    US CAROTID BILATERAL 07/14/2021 IMPRESSION: Atherosclerosis with suspected stenosis greater than 50% at the right proximal ICA visually. Velocities are discordant and appear improved from prior. Atherosclerosis on the left with no evidence of flow-limiting stenosis greater than 50%.  FINDINGS: Right: Internal Carotid Artery (ICA): Peak systolic velocity 118 cm/sec. 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 09/22/2020    DELONTE on CPAP     Osteoarthritis     Paroxysmal atrial fibrillation     PVD (peripheral vascular disease)     Secondary esophageal varices without bleeding 06/26/2017    Type 2  diabetes mellitus       Past Surgical History:   Procedure Laterality Date    ANGIOGRAM, EXTREMITY, UNILATERAL Left 1/4/2024    Procedure: ANGIOGRAM, EXTREMITY, UNILATERAL- Femoral / SMS Stent, Poss General;  Surgeon: ALEX Alfred III, MD;  Location: [address];  Service: Vascular;  Laterality: Left;  mGy : 2698.78  Gy.cm : 229.88  Contrast : 62ml  Fluro time : 13.8min    CARDIAC CATHETERIZATION      CARDIAC SURGERY      CARPAL TUNNEL RELEASE Left     CATARACT EXTRACTION      CATARACT EXTRACTION W/ INTRAOCULAR LENS  IMPLANT, BILATERAL  2008    CATHETERIZATION OF BOTH LEFT AND RIGHT HEART N/A 11/2/2018    Procedure: CATHETERIZATION, HEART, BOTH LEFT AND RIGHT;  Surgeon: Frank Gallardo MD;  Location: [facility] CATH LAB;  Service: Cardiology;  Laterality: N/A;    COLONOSCOPY  8/2013    COLONOSCOPY N/A 5/30/2016    Procedure: COLONOSCOPY;  Surgeon: Anna Tomas MD;  Location: [facility] ENDO;  Service: Endoscopy;  Laterality: N/A;    COLONOSCOPY N/A 7/17/2019    Procedure: COLONOSCOPY;  Surgeon: David Carter III, MD;  Location: [facility];  Service: Endoscopy;  Laterality: N/A;    COLONOSCOPY N/A 12/14/2023    Procedure: COLONOSCOPY;  Surgeon: Ama Barrera MD;  Location: [facility];  Service: Endoscopy;  Laterality: N/A;    COMPUTED TOMOGRAPHY N/A 9/9/2019    Procedure: CT (COMPUTED TOMOGRAPHY);  Surgeon: [facility] Diagnostic Provider;  Location: [facility];  Service: General;  Laterality: N/A;  Microwave ablation to be done in CT.  Need CRNA and cart    COMPUTED TOMOGRAPHY N/A 1/25/2022    Procedure: Liver Microwave Ablation;  Surgeon: Red Puentes MD;  Location: [facility];  Service: General;  Laterality: N/A;    CORONARY ARTERY BYPASS GRAFT (CABG) N/A 11/5/2018    Procedure: CORONARY ARTERY BYPASS GRAFT (CABG);  Surgeon: Bear De Luna MD;  Location: [facility];  Service: Cardiothoracic;  Laterality: N/A;  TWO VESSEL BYPASS WITH AORTOTOMY AND EXCISION OF ATHEROSCLEROTIC PLAQUE    CORONARY BYPASS GRAFT  ANGIOGRAPHY  3/2/2020    Procedure: Bypass graft study;  Surgeon: Cora Cormier MD;  Location: [facility] CATH LAB;  Service: Cardiology;;    ELBOW BURSA SURGERY Right 2010    ENDOSCOPIC HARVEST OF VEIN Left 11/5/2018    Procedure: SURGICAL PROCUREMENT, VEIN, ENDOSCOPIC;  Surgeon: Bear De Luna MD;  Location: [facility] OR;  Service: Cardiothoracic;  Laterality: Left;    ESOPHAGOGASTRODUODENOSCOPY N/A 7/17/2019    Procedure: ESOPHAGOGASTRODUODENOSCOPY (EGD);  Surgeon: David Carter III, MD;  Location: [facility];  Service: Endoscopy;  Laterality: N/A;    ESOPHAGOGASTRODUODENOSCOPY N/A 12/29/2022    Procedure: ESOPHAGOGASTRODUODENOSCOPY (EGD);  Surgeon: Issa Gayle MD;  Location: [facility];  Service: Endoscopy;  Laterality: N/A;    ESOPHAGOGASTRODUODENOSCOPY N/A 1/17/2024    Procedure: EGD (ESOPHAGOGASTRODUODENOSCOPY);  Surgeon: Issa Gayle MD;  Location: [facility];  Service: Endoscopy;  Laterality: N/A;    ESOPHAGOGASTRODUODENOSCOPY N/A 4/30/2024    Procedure: EGD (ESOPHAGOGASTRODUODENOSCOPY);  Surgeon: Eder Foley MD;  Location: [facility];  Service: Gastroenterology;  Laterality: N/A;    ETHMOIDECTOMY Bilateral 3/27/2019    Procedure: ETHMOIDECTOMY;  Surgeon: Alejandro Parkinson MD;  Location: [facility];  Service: ENT;  Laterality: Bilateral;    EYE SURGERY      FOOT SURGERY      FRONTAL SINUS OBLITERATION Bilateral 3/27/2019    Procedure: SINUSOTOMY, FRONTAL SINUS, OBLITERATIVE;  Surgeon: Alejandro Parkinson MD;  Location: [facility];  Service: ENT;  Laterality: Bilateral;    FUNCTIONAL ENDOSCOPIC SINUS SURGERY (FESS) Bilateral 3/27/2019    Procedure: FESS (FUNCTIONAL ENDOSCOPIC SINUS SURGERY);  Surgeon: Alejandro Parkinson MD;  Location: [facility];  Service: ENT;  Laterality: Bilateral;  Left Keila bullosa resection     INTRALUMINAL GASTROINTESTINAL TRACT IMAGING VIA CAPSULE N/A 2/2/2024    Procedure: IMAGING PROCEDURE, GI TRACT, INTRALUMINAL, VIA CAPSULE;  Surgeon: Cooper Estrella RN;  Location: [facility] [place];  Service:  Endoscopy;  Laterality: N/A;    KNEE ARTHROSCOPY W/ MENISCAL REPAIR Left 06/2019    dr boykin    KNEE SURGERY Right     LEFT HEART CATHETERIZATION Left 3/2/2020    Procedure: CATHETERIZATION, HEART, LEFT;  Surgeon: Cora Cormier MD;  Location: [facility] CATH LAB;  Service: Cardiology;  Laterality: Left;    LIVER BIOPSY  01/2022    MAXILLARY ANTROSTOMY Bilateral 3/27/2019    Procedure: MAXILLARY ANTROSTOMY;  Surgeon: Alejandro Parkinson MD;  Location: [facility] OR;  Service: ENT;  Laterality: Bilateral;    NASAL SEPTOPLASTY N/A 3/27/2019    Procedure: SEPTOPLASTY, NOSE;  Surgeon: Alejandro Parkinson MD;  Location: [facility] OR;  Service: ENT;  Laterality: N/A;    RECTAL SURGERY  2012    STENT, SUPERIOR MESENTERIC ARTERY Left 1/4/2024    Procedure: STENT, SUPERIOR MESENTERIC ARTERY;  Surgeon: ALEX Alfred III, MD;  Location: [address];  Service: Vascular;  Laterality: Left;    TRANSURETHRAL RESECTION OF PROSTATE (TURP) WITHOUT USE OF LASER N/A 6/19/2018    Procedure: TURP, WITHOUT USING LASER;  Surgeon: Cooper Cordova IV, MD;  Location: [facility] OR;  Service: Urology;  Laterality: N/A;    TRIGGER FINGER RELEASE Left       Meds:     Review of patient's allergies indicates:   Allergen Reactions    Lipitor [atorvastatin] Other (See Comments)     Muscle aches and pains as well as fatigue.     Niacin preparations Other (See Comments)     Causes broken blood vessels and bruises at 4 times normal dose.    Statins-hmg-coa reductase inhibitors Other (See Comments)     Statins cause intolerable myalgias.    Iodinated contrast media Hives     Tachycardia    Omeprazole Hives and Itching    Prilosec [omeprazole magnesium] Hives and Itching     Current Outpatient Medications   Medication Instructions    ACCU-CHEK GRACE PLUS METER Misc AS DIRECTED    aspirin (ECOTRIN) 81 mg, Oral, Daily    blood sugar diagnostic (ACCU-CHEK GRACE PLUS TEST STRP) Strp TEST THREE TIMES A DAY    budesonide-formoterol 160-4.5 mcg (SYMBICORT) 160-4.5 mcg/actuation HFAA INHALE  "2 PUFFS INTO THE LUNGS TWO TIMES A DAY.    carvediloL (COREG) 25 mg, Oral, 2 times daily with meals    famotidine (PEPCID) 40 mg, Oral    finasteride (PROSCAR) 5 mg, Oral    furosemide (LASIX) 40 mg, Oral, 2 times daily    GLUCOSAMINE HCL/CHONDR ROSARIO A NA (OSTEO BI-FLEX ORAL) 1 tablet, Oral, 2 times daily    inclisiran (LEQVIO) 284 mg, Subcutaneous, Every 3 months    inclisiran (LEQVIO) 284 mg, Subcutaneous, Every 6 months    insulin glargine U-100 (Lantus) (LANTUS SOLOSTAR U-100 INSULIN) 44 Units, Subcutaneous, Nightly    JARDIANCE 25 mg, Oral    krill oil 500 mg Cap Oral    lancets (ACCU-CHEK FASTCLIX LANCET DRUM) Misc TEST TWO TIMES A DAY    levocetirizine (XYZAL) 5 mg, Oral, Nightly    levothyroxine (SYNTHROID) 50 mcg, Oral, Before breakfast    levothyroxine (SYNTHROID) 300 mcg, Oral, Every morning    lisinopriL 10 mg, Oral    metFORMIN (GLUCOPHAGE-XR) 500 mg, Oral, 2 times daily with meals    mirabegron (MYRBETRIQ) 25 mg, Oral, Daily    montelukast (SINGULAIR) 10 mg, Oral, Daily    multivitamin (THERAGRAN) per tablet 1 tablet, Oral, Daily    pen needle, diabetic (BD ULTRA-FINE MINI PEN NEEDLE) 31 gauge x 3/16" Ndle USE AS DIRECTED    propafenone (RHTHYMOL) 150 mg, Oral, Every 8 hours    RABEprazole (ACIPHEX) 40 mg, Oral, 2 times daily    ranolazine (RANEXA) 1,000 mg, Oral, 2 times daily    REPATHA SURECLICK 140 mg/mL PnIj 140 mLs, abdominal subcutaneous, Every 14 days    rivaroxaban (XARELTO) 20 mg, Oral, With dinner    sildenafiL (VIAGRA) 100 MG tablet Take 1/2 to 1 tablet by mouth one hour prior to intercourse. Max 100mg per day     Review of Systems   Constitutional:  Positive for malaise/fatigue. Negative for chills, diaphoresis and fever.   HENT:  Negative for sore throat.    Eyes:  Negative for double vision.   Respiratory:  Positive for shortness of breath. Negative for cough and wheezing.    Cardiovascular:  Negative for chest pain, palpitations, orthopnea, claudication, leg swelling and PND. " "  Gastrointestinal:  Negative for abdominal pain, blood in stool, constipation, heartburn, nausea and vomiting.   Genitourinary:  Negative for hematuria.   Musculoskeletal:  Negative for falls and myalgias.   Neurological:  Negative for dizziness, loss of consciousness, weakness and headaches.   Psychiatric/Behavioral:  The patient is not nervous/anxious.      Objective:   BP (!) 148/73 (BP Location: Right arm, Patient Position: Lying)   Pulse 72   Temp 99.3 °F (37.4 °C) (Temporal)   Resp 16   Ht 5' 7" (1.702 m)   Wt 97.5 kg (215 lb)   SpO2 99%   BMI 33.67 kg/m²   Physical Exam  Constitutional:       General: He is not in acute distress.     Appearance: He is obese. He is not diaphoretic.   HENT:      Head: Normocephalic and atraumatic.   Eyes:      General: No scleral icterus.     Extraocular Movements: Extraocular movements intact.      Pupils: Pupils are equal, round, and reactive to light.   Neck:      Vascular: No hepatojugular reflux or JVD.   Cardiovascular:      Rate and Rhythm: Normal rate. Rhythm irregular.      Pulses: Normal pulses.      Heart sounds: No murmur heard.  Pulmonary:      Effort: Pulmonary effort is normal. No respiratory distress.      Breath sounds: Normal breath sounds. No wheezing.   Chest:      Chest wall: No tenderness.   Abdominal:      General: Abdomen is flat. Bowel sounds are normal. There is no distension.      Tenderness: There is no abdominal tenderness.   Musculoskeletal:      Right lower leg: No edema.      Left lower leg: No edema.   Skin:     General: Skin is warm and dry.      Capillary Refill: Capillary refill takes less than 2 seconds.      Findings: No rash or wound.   Neurological:      General: No focal deficit present.      Mental Status: He is alert and oriented to person, place, and time.   Psychiatric:         Mood and Affect: Mood normal.         Thought Content: Thought content normal.       Labs:     Lab Results   Component Value Date     06/17/2024 "    K 4.9 06/17/2024     06/17/2024    CO2 26 06/17/2024    BUN 21 06/17/2024    CREATININE 1.2 06/17/2024    ANIONGAP 8 06/17/2024     Lab Results   Component Value Date    HGBA1C 5.3 04/09/2024     Lab Results   Component Value Date     (H) 05/11/2024     (H) 03/25/2024     (H) 02/23/2024    Lab Results   Component Value Date    WBC 3.68 (L) 06/17/2024    HGB 11.2 (L) 06/17/2024    HCT 34.8 (L) 06/17/2024    HCT 26 (L) 11/05/2018     06/17/2024    GRAN 3.1 06/06/2024    GRAN 65.6 06/06/2024     Lab Results   Component Value Date    CHOL 154 04/09/2024    HDL 51 04/09/2024    LDLCALC 86.2 04/09/2024    TRIG 84 04/09/2024          Assessment & Plan:     78yoM with PMHx significant for CAD s/p CABG x2, GI bleed on xarelto, and persistent atrial fibrillation (PSFTN-8-JSIq 4, HASBLED 4) who presents for PVI ablation. Restarted xarelto 2 weeks ago.    Plan:  - RF-PVI  - Anesthesia  - BERNARD prior  - ZIA  - PPI x 30 days  - LAAO 4-6 weeks after ablation    Anticoagulation: xarelto  EF (most recent): 60-65%  AAD/AVN agents: carvedilol 25mg BID, propafenone 150mg TID    The risks, benefits and alternatives of the procedure were explained to the patient, patient's family and/or surrogate decision maker. Risks include (but not limited to) bleeding, hematoma, infection, pain, vascular damage, damage to structures surrounding the vasculature, myocardial damage [perforation, valvular damage], cardiac tamponade, phrenic nerve damage, pulmonary vein stenosis, atrioesophageal (AE) fistula, CVA, MI, and death. Patient is understanding that repeat ablations may be required. All questions were answered. Patient is understanding of these risks, and would like to proceed. Consents signed.     Case discussed with Dr. Huerta.    Signed:  Ariel Campuzano MD  Cardiovascular Disease PGY-V  [facility]

## 2024-06-24 NOTE — BRIEF OP NOTE
Attending: Horacio Huerta MD  Date of Procedure: 06/24/2024    Post-operative Diagnosis: AF    Procedure Performed: Pulmonary vein isolation of all 4 pulmonary veins via radiofrequency ablation.     Description of Procedure: The patient was brought to the EP lab in the fasting state. Prepped and draped in sterile fashion. Safety timeout was performed. Sedation administered by anesthesia staff. Ultrasound guided venous access of the bilateral femoral veins was performed. ICE and CS catheters placed via left femoral vein access. Long sheaths via right femoral venous access. Heparin bolus and continuous infusion per protocol. Two transseptal punctures performed using combination of fluoroscopic and ICE guidance. Map created. RFA to isolate all pulmonary veins. ICE confirmed no significant pericardial effusion.     EBL: <10 mL    Specimens: none  Complications: no immediate    Plan:  Bedrest x 4 hrs  Remove sutures at 3 hours post-procedure  Ambulation at 4 hours post-procedure if there is no evidence of access site complications  Close monitoring of hemodynamics, access site, and neurologic status  ECG upon arrival to PACU  Patient to resume OAC this evening. If bleeding or hematoma formation, please notify EP service before holding OAC  Medication changes: initiation of Protonix 40 mg daily x 30 days  Recommend ibuprofen 800 mg TID x 3 days for pericarditis post-procedure  Plan for discharge following bedrest if patient tolerating PO intake, voiding, and ambulatory without evidence of complications     Ariel Campuzano MD  Cardiovascular Disease PGY-V  [facility]

## 2024-06-24 NOTE — NURSING
Pt ambulates post bedrest completion. Bilateral groins remain CDI. No bleeding or hematoma noted. Pt unable to void at this time, denies need to. Encouraged hydration, pt to increase hydration and reattempt later.

## 2024-06-24 NOTE — DISCHARGE SUMMARY
Electrophysiology  Discharge Summary      Admit Date: 6/24/2024  Discharge Date:  6/24/2024  Attending Physician: Horacio Huerta MD  Discharge Physician: Ariel Campuzano MD    Principal Diagnoses: <principal problem not specified>  Indication for Admission: Ablation atrial fibrillation (N/A), Transesophageal echo (BERNARD) intra-procedure log documentation (N/A)    Discharged Condition: Good    Hospital Course:   Patient underwent successful RF-PVI for treatment of atrial fibrillation. No evidence of intra- or post-procedure complications. Post-ablation ECG shows NSR, and no acute abnormalities.     Plan for Watchman placement in 4-6 weeks.    EP medications at discharge:  Antiarrhythmics and/or AVN agents:   Continue propafenone 150mg TID  Continue coreg 25mg BID.   Continue home rabeprazole 40mg daily for 30 days.   Patient was instructed to continue their oral anticoagulation as previously prescribed  Patient was instructed to take ibuprofen 800 mg TID x 3 days for pericarditis.     Groin access sites without hematoma or bleeding. Activity restrictions given to patient. Instructed to seek medical attention for shortness of breath, chest discomfort not alleviated with NSAIDs, bleeding/hematoma formation at the access sites, acute onset of neurologic symptoms, N/V, or hematemesis. At discharge the patient denied CP, SOB, access site bleeding/hematoma, or any other complaints or evidence of complications.    Medications:  Make sure to continue taking your blood thinner rivaroxaban (trade name: Xarelto) after your procedure as you would normally take  Take pantoprazole (trade name: Protonix) nightly for at least 30 days after your procedure. This is to protect your esophagus during the post-operative period.  If given a prescription of furosemide (trade name: Lasix), which is a diuretic (fluid pill), you can take it daily or twice daily as needed for fluid retention or shortness of breath following your  "ablation  You may experience chest discomfort (also known as "pericarditis") with deep breathes, coughing, and/or laying down which is typically normal following your procedure. If this occurs, you can take ibuprofen (Motrin) 800 mg every 8 hours for 2-3 days. If the chest pain is persistent or severe please visit the nearest emergency department.    Groin site management, precautions, and restrictions:  Remove the bandages over your groin area the morning after your procedure. You can shower after you remove these bandages. Keep the groin sites clean and dry. You do not need to apply ointments or bandages to the area.   If oozing from groin site occurs, apply pressure without letting up for 15 minutes and lay flat for 1 hour. If bleeding has resolved, you can continue to monitor. If the bleeding continues or there is significant swelling or pain in the groin area, please visit the nearest ER for evaluation and treatment. DO NOT STOP TAKING YOUR BLOOD THINNER UNLESS INSTRUCTED BY A PHYSICIAN.   Do not take baths or submerge your groin area or at least 1 week or when the puncture sites in your groin have completely healed  Do not lift anything over 10 lbs for the first week after your procedure, and avoid strenuous activity during this time to allow for the groin sites to heal. After 1 week, there are no activity restrictions.  Please contact the electrophysiology clinic or go to the ER if you experience: severe chest pain, shortness of breath, bleeding or swelling of the groin sites, or any other concerns.     Diet: Cardiac diet    Activity: Ad celestino, wound care instructions provided    Disposition: Home or Self Care    Follow Up:   Follow-up Information       Horacio Huerta MD Follow up.    Specialties: Electrophysiology, Cardiovascular Disease, Cardiology  Contact information:  [address]  [facility] [address]  [phone]                             Discharge Medications:      Medication List    "     CHANGE how you take these medications      RABEprazole 20 mg tablet  Commonly known as: ACIPHEX  Take 2 tablets (40 mg total) by mouth once daily.  What changed: when to take this            CONTINUE taking these medications      ACCU-CHEK GRACE PLUS METER Misc  Generic drug: blood-glucose meter  AS DIRECTED     ACCU-CHEK FASTCLIX LANCET DRUM Misc  Generic drug: lancets  TEST TWO TIMES A DAY     aspirin 81 MG EC tablet  Commonly known as: ECOTRIN  Take 1 tablet (81 mg total) by mouth once daily.     blood sugar diagnostic Strp  Commonly known as: ACCU-CHEK GRACE PLUS TEST STRP  TEST THREE TIMES A DAY     budesonide-formoterol 160-4.5 mcg 160-4.5 mcg/actuation Hfaa  Commonly known as: SYMBICORT  INHALE 2 PUFFS INTO THE LUNGS TWO TIMES A DAY.     carvediloL 12.5 MG tablet  Commonly known as: COREG  Take 2 tablets (25 mg total) by mouth 2 (two) times daily with meals.     famotidine 40 MG tablet  Commonly known as: PEPCID     finasteride 5 mg tablet  Commonly known as: PROSCAR  TAKE 1 TABLET BY MOUTH once daily     furosemide 40 MG tablet  Commonly known as: LASIX  Take 1 tablet (40 mg total) by mouth 2 (two) times daily.     * inclisiran 284 mg/1.5 mL Syrg subcutaneous injection  Commonly known as: LEQVIO  Inject 1.5 mLs (284 mg total) into the skin every 3 (three) months.     * inclisiran 284 mg/1.5 mL Syrg subcutaneous injection  Commonly known as: LEQVIO  Inject 1.5 mLs (284 mg total) into the skin every 6 (six) months.     insulin glargine U-100 (Lantus) 100 unit/mL (3 mL) Inpn pen  Commonly known as: LANTUS SOLOSTAR U-100 INSULIN  Inject 44 Units into the skin every evening.     JARDIANCE 25 mg tablet  Generic drug: empagliflozin  TAKE 1 TABLET BY MOUTH EVERY DAY     krill oil 500 mg Cap     levocetirizine 5 MG tablet  Commonly known as: XYZAL  Take 1 tablet (5 mg total) by mouth every evening.     * levothyroxine 50 MCG tablet  Commonly known as: SYNTHROID  Take 1 tablet (50 mcg total) by mouth before  "breakfast.     * levothyroxine 300 MCG Tab  Commonly known as: SYNTHROID  Take 1 tablet (300 mcg total) by mouth every morning.     lisinopriL 10 MG tablet  Take 1 tablet by mouth once daily     metFORMIN 500 MG ER 24hr tablet  Commonly known as: GLUCOPHAGE-XR  Take 1 tablet (500 mg total) by mouth 2 (two) times daily with meals.     mirabegron 25 mg Tb24 ER tablet  Commonly known as: MYRBETRIQ  Take 1 tablet (25 mg total) by mouth once daily.     montelukast 10 mg tablet  Commonly known as: SINGULAIR  Take 1 tablet (10 mg total) by mouth once daily.     multivitamin per tablet  Commonly known as: THERAGRAN     OSTEO BI-FLEX ORAL     pen needle, diabetic 31 gauge x 3/16" Ndle  Commonly known as: BD ULTRA-FINE MINI PEN NEEDLE  USE AS DIRECTED     propafenone 150 MG Tab  Commonly known as: RHTHYMOL  Take 1 tablet (150 mg total) by mouth every 8 (eight) hours.     ranolazine 1,000 mg Tb12  Commonly known as: RANEXA  Take 1 tablet (1,000 mg total) by mouth 2 (two) times daily.     REPATHA SURECLICK 140 mg/mL Pnij  Generic drug: evolocumab     sildenafiL 100 MG tablet  Commonly known as: VIAGRA  Take 1/2 to 1 tablet by mouth one hour prior to intercourse. Max 100mg per day     XARELTO 20 mg Tab  Generic drug: rivaroxaban           * This list has 4 medication(s) that are the same as other medications prescribed for you. Read the directions carefully, and ask your doctor or other care provider to review them with you.                   Where to Get Your Medications        These medications were sent to [facility]  [address] [address]      Hours: Always Open Phone: [phone]   RABEprazole 20 mg tablet         Ariel Campuzano MD  Cardiovascular Disease PGY-V  [facility]   "

## 2024-06-24 NOTE — PLAN OF CARE
Received report from Hannah pacu RN. Patient s/p PVI with bilateral groin access, AAOx3. VSS, no c/o pain or discomfort at this time, resp even and unlabored on RA.. Bilateral sutures are CDI. No active bleeding. No hematoma noted. Post procedure protocol reviewed with patient and patient's family. Understanding verbalized. Family members called to bedside. Nurse call bell within reach.

## 2024-06-24 NOTE — ANESTHESIA PROCEDURE NOTES
Intubation    Date/Time: 6/24/2024 11:50 AM    Performed by: Lombardi, Nicole A., CRNA  Authorized by: CORTNEY Do MD    Intubation:     Induction:  Intravenous    Intubated:  Postinduction    Mask Ventilation:  Easy mask (2 person mask/bag ventilations)    Attempts:  1    Attempted By:  CRNA    Method of Intubation:  Video laryngoscopy    Blade:  Pablo 4    Laryngeal View Grade: Grade IIA - cords partially seen      Difficult Airway Encountered?: No      Complications:  None    Airway Device:  Oral endotracheal tube    Airway Device Size:  7.5    Style/Cuff Inflation:  Cuffed    Inflation Amount (mL):  8    Tube secured:  23    Secured at:  The lips    Placement Verified By:  Capnometry and Revisualization with laryngoscopy    Complicating Factors:  Short neck, obesity and oropharyngeal edema or fat    Findings Post-Intubation:  BS equal bilateral and atraumatic/condition of teeth unchanged

## 2024-06-24 NOTE — ANESTHESIA PREPROCEDURE EVALUATION
06/24/2024  Erendira Pretty is a 79 y.o., male.  [facility]  Anesthesia Pre-Operative Evaluation       Patient Name: Erendira Pretty  YOB: 1945  MRN: [id number]  [facility]: [id number]      Code Status: Prior   Date of Procedure: 6/24/2024  Anesthesia: General Procedure: Procedure(s) (LRB):  Ablation atrial fibrillation (N/A)  Transesophageal echo (BERNARD) intra-procedure log documentation (N/A)  Pre-Operative Diagnosis: Paroxysmal atrial fibrillation [I48.0]  Proceduralist: Surgeons and Role:  Panel 1:     * Horacio Huerta MD - Primary  Panel 2:     * Provider, [facility] - Primary        SUBJECTIVE:   Erendira Pretty is a 79 y.o. male who is here today for Procedure(s) (LRB):  Ablation atrial fibrillation (N/A)  Transesophageal echo (BERNARD) intra-procedure log documentation (N/A).   No notes on file    No current facility-administered medications for this encounter.       he has a current medication list which includes the following long-term medication(s): accu-chek lux plus meter, aspirin, budesonide-formoterol 160-4.5 mcg, carvedilol, finasteride, furosemide, insulin glargine u-100 (lantus), levocetirizine, levothyroxine, levothyroxine, lisinopril, metformin, mirabegron, propafenone, rabeprazole, ranolazine, and sildenafil.   ALLERGIES:     Review of patient's allergies indicates:   Allergen Reactions    Lipitor [atorvastatin] Other (See Comments)     Muscle aches and pains as well as fatigue.     Niacin preparations Other (See Comments)     Causes broken blood vessels and bruises at 4 times normal dose.    Statins-hmg-coa reductase inhibitors Other (See Comments)     Statins cause intolerable myalgias.    Iodinated contrast media Hives     Tachycardia    Omeprazole Hives and Itching    Prilosec [omeprazole magnesium] Hives and Itching     History:   There are no hospital  problems to display for this patient.    Patient Active Problem List   Diagnosis    Essential hypertension    BMI 36.0-36.9,adult    PVC (premature ventricular contraction)    Acquired hypothyroidism    Coronary artery disease involving native coronary artery of native heart without angina pectoris    Dryness, eye - Both Eyes    Epiretinal membrane - Both Eyes    PVD (peripheral vascular disease)    ED (erectile dysfunction)    Anorectal fistula    Other cirrhosis of liver    Benign prostatic hyperplasia with nocturia    DELONTE on CPAP    Mild intermittent asthma without complication    Pseudophakia    Colon polyps    Anal fissure    Secondary esophageal varices without bleeding    Asbestos exposure    Chest pain, moderate coronary artery risk    Nonrheumatic aortic valve stenosis    Urethral polyp    Exertional angina    Left main coronary artery disease    Mixed hyperlipidemia    S/P CABG x 2    Pain in both lower extremities    Chronic pansinusitis    Nasal polyposis    Non-seasonal allergic rhinitis due to pollen    Iron deficiency anemia due to chronic blood loss    GERD (gastroesophageal reflux disease)    HCC (hepatocellular carcinoma)    DNR (do not resuscitate)    Open angle with borderline findings and low glaucoma risk in both eyes    Aortic atherosclerosis    Type 2 diabetes mellitus with microalbuminuria, with long-term current use of insulin    Bilateral carotid artery stenosis    Paroxysmal atrial fibrillation    Statin myopathy    Dizziness    Lower abdominal pain    Mesenteric artery stenosis    Occlusion of superior mesenteric artery    Melena    Anticoagulants causing adverse effect in therapeutic use    Class 2 severe obesity due to excess calories with serious comorbidity and body mass index (BMI) of 37.0 to 37.9 in adult    Chest pain    Left leg numbness    Episode of transient neurologic symptoms    Anemia     Medical History   Past Medical History:   Diagnosis Date    Anticoagulant long-term  use     Aortic stenosis     Asthma     Benign prostatic hyperplasia with nocturia     Bilateral carotid artery stenosis 07/21/2021    US CAROTID BILATERAL 07/14/2021 IMPRESSION: Atherosclerosis with suspected stenosis greater than 50% at the right proximal ICA visually. Velocities are discordant and appear improved from prior. Atherosclerosis on the left with no evidence of flow-limiting stenosis greater than 50%.  FINDINGS: Right: Internal Carotid Artery (ICA): Peak systolic velocity 118 cm/sec. End diastolic velocity 35 cm/sec    BPH (benign prostatic hyperplasia)     CAD (coronary artery disease)     40% lesion 2002;lazo    Cirrhosis     Claudication 04/09/2014    Colon polyp     COPD (chronic obstructive pulmonary disease)     ED (erectile dysfunction)     Encounter for blood transfusion     Ex-smoker     Hearing loss NEC     Hepatitis C     Cured;dr gibbs harvoni 2015    Hyperlipidemia     Hypertension     Hypothyroid     s/p tx graves    Open angle with borderline findings and low glaucoma risk in both eyes 09/22/2020    DLEONTE on CPAP     Osteoarthritis     Paroxysmal atrial fibrillation     PVD (peripheral vascular disease)     Secondary esophageal varices without bleeding 06/26/2017    Type 2 diabetes mellitus      Surgical History:    has a past surgical history that includes Knee surgery (Right); Trigger finger release (Left); Carpal tunnel release (Left); Elbow bursa surgery (Right, 2010); Rectal surgery (2012); Eye surgery; Cataract extraction w/ intraocular lens  implant, bilateral (2008); Cardiac catheterization; Colonoscopy (8/2013); Colonoscopy (N/A, 5/30/2016); Transurethral resection of prostate (TURP) without use of laser (N/A, 6/19/2018); Coronary Artery Bypass Graft (CABG) (N/A, 11/5/2018); Endoscopic harvest of vein (Left, 11/5/2018); Cardiac surgery; Catheterization of both left and right heart (N/A, 11/2/2018); Functional endoscopic sinus surgery (FESS) (Bilateral, 3/27/2019); Frontal sinus  "obliteration (Bilateral, 3/27/2019); Maxillary antrostomy (Bilateral, 3/27/2019); Ethmoidectomy (Bilateral, 3/27/2019); Nasal septoplasty (N/A, 3/27/2019); Knee arthroscopy w/ meniscal repair (Left, 06/2019); Esophagogastroduodenoscopy (N/A, 7/17/2019); Colonoscopy (N/A, 7/17/2019); Computed tomography (N/A, 9/9/2019); Cataract extraction; Left heart catheterization (Left, 3/2/2020); Coronary bypass graft angiography (3/2/2020); Foot surgery; Computed tomography (N/A, 1/25/2022); Liver biopsy (01/2022); Esophagogastroduodenoscopy (N/A, 12/29/2022); Colonoscopy (N/A, 12/14/2023); angiogram, extremity, unilateral (Left, 1/4/2024); stent, superior mesenteric artery (Left, 1/4/2024); Esophagogastroduodenoscopy (N/A, 1/17/2024); Intraluminal gastrointestinal tract imaging via capsule (N/A, 2/2/2024); and Esophagogastroduodenoscopy (N/A, 4/30/2024).   Social History:    reports that he quit smoking about 52 years ago. His smoking use included cigarettes. He started smoking about 56 years ago. He has a 2 pack-year smoking history. He quit smokeless tobacco use about 47 years ago.  His smokeless tobacco use included chew. He reports current alcohol use of about 2.0 standard drinks of alcohol per week. He reports that he does not use drugs.     OBJECTIVE:     Vital Signs (Most Recent):    Vital Signs Range (Last 24H):        Last 3 Vitals:       6/11/2024     8:45 AM 6/11/2024     9:49 AM 6/13/2024     1:30 PM   Vitals - 1 value per visit   SYSTOLIC 122 117    DIASTOLIC 60 66    Pulse  63    Weight (lb) 223 223 215   Weight (kg) 101.152 101.152 97.523   Height 5' 7" (1.702 m) 5' 7" (1.702 m) 5' 7" (1.702 m)   BMI (Calculated) 34.9 34.9 33.7   Pain Score   Zero     There is no height or weight on file to calculate BMI.   Wt Readings from Last 4 Encounters:   06/13/24 97.5 kg (215 lb)   06/11/24 101.2 kg (223 lb)   06/11/24 101.2 kg (223 lb)   05/23/24 101.3 kg (223 lb 5.2 oz)     Significant Labs:  Lab Results   Component " Value Date    WBC 3.68 (L) 06/17/2024    HGB 11.2 (L) 06/17/2024    HCT 34.8 (L) 06/17/2024     06/17/2024     06/17/2024    K 4.9 06/17/2024     06/17/2024    CREATININE 1.2 06/17/2024    BUN 21 06/17/2024    CO2 26 06/17/2024     (H) 06/17/2024    CALCIUM 9.6 06/17/2024    MG 2.0 02/24/2024    PHOS 3.7 02/24/2024    ALKPHOS 57 06/06/2024    ALT 16 06/06/2024    AST 21 06/06/2024    ALBUMIN 3.7 06/06/2024    INR 1.1 06/17/2024    APTT 33.9 (H) 06/17/2024    GLUF 130 (H) 05/20/2009    HGBA1C 5.3 04/09/2024     04/26/2022    TROPONINI 0.020 05/11/2024     (H) 05/11/2024    NTPROBNP 35 02/24/2013     No LMP for male patient.      EKG:   Results for orders placed or performed during the hospital encounter of 05/11/24   EKG 12-lead    Collection Time: 05/11/24  7:27 AM   Result Value Ref Range    QRS Duration 94 ms    [facility] QTC Calculation 444 ms    Narrative    Test Reason : R06.02,    Vent. Rate : 068 BPM     Atrial Rate : 068 BPM     P-R Int : 384 ms          QRS Dur : 094 ms      QT Int : 418 ms       P-R-T Axes : 064 033 063 degrees     QTc Int : 444 ms    Sinus rhythm with sinus arrhythmia with 1st degree A-V block  Otherwise normal ECG  When compared with ECG of 28-APR-2024 13:43,  No significant change was found  Confirmed by Getachew Serna MD ([id number]) on 5/12/2024 9:36:03 PM    Referred By: [email or web address]   SELF           Confirmed By:Getachew Serna MD       TTE:  Results for orders placed or performed during the hospital encounter of 06/11/24   Echo   Result Value Ref Range    Est. RA pres 3 mmHg    Narrative      Left Ventricle: The left ventricle is normal in size. Increased wall   thickness. There is moderate asymmetric hypertrophy. Septal motion is   consistent with post-operative status. There is normal systolic function   with a visually estimated ejection fraction of 55 - 60%. Grade II   diastolic dysfunction.    Right Ventricle: Mild right ventricular enlargement.  Wall thickness is   normal. Systolic function is mildly reduced.    Left Atrium: Left atrium is moderately dilated.    Aortic Valve: There is severe aortic valve sclerosis. Moderately   restricted motion. There is moderate stenosis. Aortic valve area by VTI is   1.18 cm². Aortic valve peak velocity is 2.84 m/s. Mean gradient is 20   mmHg. The dimensionless index is 0.36. There is mild aortic regurgitation.    Mitral Valve: There is mild regurgitation.    Tricuspid Valve: There is moderate regurgitation.    Pulmonary Artery: The estimated pulmonary artery systolic pressure is   33 mmHg.    IVC/SVC: Normal venous pressure at 3 mmHg.       EF   Date Value Ref Range Status   08/28/2023 55 % Final     Nuc Stress EF   Date Value Ref Range Status   06/11/2024 72 % Final   08/28/2023 72 % Final     Nuc Rest EF   Date Value Ref Range Status   06/11/2024 71  Final   08/28/2023 62  Final      No results found. However, due to the size of the patient record, not all encounters were searched. Please check Results Review for a complete set of results.  BERNARD:  No results found. However, due to the size of the patient record, not all encounters were searched. Please check Results Review for a complete set of results.  Stress Test:  Results for orders placed during the hospital encounter of 06/11/24    Nuclear Stress - Cardiology Interpreted    Interpretation Summary    Normal myocardial perfusion scan. There is no evidence of myocardial ischemia or infarction.    The gated perfusion images showed an ejection fraction of 71% at rest. The gated perfusion images showed an ejection fraction of 72% post stress. Normal ejection fraction is greater than 59%.    The ECG portion of the study is negative for ischemia.    There were no arrhythmias during stress.     [facility]:  Results for orders placed during the hospital encounter of 03/01/20    Cardiac catheterization    Conclusion  · The ejection fraction is calculated to be 70%.  · LV end  diastolic pressure is elevated/hypertensive.  · There is hyperdynamic left ventricular systolic function.  · Mild aortic valve stenosis.  · LVEDP (Pre): 21  · LVEDP (Post): 21  · Mid LM to Dist LM lesion , 80% stenosed.  · Dist LM to Ost LAD lesion , 90% stenosed.  · Ost Cx to Prox Cx lesion , 90% stenosed.  · Mid Cx lesion , 100% stenosed.  · Post Atrio lesion , 50% stenosed.  · Estimated blood loss: none  · Hypercontractile LV (cavity obliteration).  · Diastolic dysfunction.  · Three vessel coronary artery disease.    I certify that I was present for catheter insertion, catheter manipulation, angiography, and angiographic interpretation of this patient.    Procedure Log documented by Documenter: Yadiel Silvestre RN and verified by Eli Cormier MD.    Date: 3/2/2020  Time: 5:39 PM       ASSESSMENT/PLAN:         Pre-op Assessment    I have reviewed the Patient Summary Reports.     I have reviewed the Nursing Notes. I have reviewed the NPO Status.   I have reviewed the Medications.     Review of Systems  Cardiovascular:     Hypertension   CAD      Angina           Cardiovascular Symptoms: Angina       Coronary Artery Disease:                            Hypertension     Atrial Fibrillation     Pulmonary:   COPD Asthma    Sleep Apnea   Asthma:   Chronic Obstructive Pulmonary Disease (COPD):           Obstructive Sleep Apnea (DELONTE).           Hepatic/GI:     GERD Liver Disease, Hepatitis    Gerd       Liver Disease, Hepatitis        Musculoskeletal:  Arthritis        Arthritis          Neurological:    Neuromuscular Disease,           Arthritis                         Neuromuscular Disease   Endocrine:  Diabetes Hypothyroidism   Diabetes                   Hypothyroidism              Physical Exam  General: Well nourished, Cooperative and Alert    Airway:  Mallampati: III / II  Mouth Opening: Normal  TM Distance: Normal  Tongue: Normal  Neck ROM: Normal ROM    Chest/Lungs:  Normal Respiratory Rate    Heart:  Rate:  Normal        Anesthesia Plan  Type of Anesthesia, risks & benefits discussed:    Anesthesia Type: Gen ETT  Intra-op Monitoring Plan: Standard ASA Monitors and Art Line  Post Op Pain Control Plan: IV/PO Opioids PRN  Induction:  IV  Airway Plan: Video, Post-Induction  Informed Consent: Informed consent signed with the Patient and all parties understand the risks and agree with anesthesia plan.  All questions answered.   ASA Score: 3  Day of Surgery Review of History & Physical: H&P Update referred to the surgeon/provider.    Ready For Surgery From Anesthesia Perspective.     .

## 2024-06-25 NOTE — PLAN OF CARE
"Pt able to void without issues. Pt reports he urinated "a lot" this morning. Patient discharged per MD orders. Instructions given on medications, wound care, activity, signs of infection, when to call MD, and follow up appointments. Pt verbalized understanding. PIVs removed. Patient and family used wc off unit to private vehicle.  "

## 2024-06-26 ENCOUNTER — TELEPHONE (OUTPATIENT)
Dept: CARDIOLOGY | Facility: CLINIC | Age: 79
End: 2024-06-26
Payer: MEDICARE

## 2024-06-27 ENCOUNTER — TELEPHONE (OUTPATIENT)
Dept: ELECTROPHYSIOLOGY | Facility: CLINIC | Age: 79
End: 2024-06-27
Payer: MEDICARE

## 2024-06-27 NOTE — TELEPHONE ENCOUNTER
----- Message from Rachel Pena sent at 6/27/2024  1:20 PM CDT -----  Contact: Erendira Krishna would like a call back at [phone]  in regards to wanting to make an appointment for a follow up from the procedure. I attempted to schedule, first available for 7/31/24, he wants something sooner.  Thanks   Am

## 2024-06-27 NOTE — TELEPHONE ENCOUNTER
----- Message from Nicolle Ziegler MA sent at 6/27/2024  2:37 PM CDT -----  Contact: Erendira  The pt wants to know if he should continue taking propafenone hcl 150 tid? Ranolazine 1000 bid?    Nicolle  ----- Message -----  From: Rachel Pena  Sent: 6/27/2024   1:21 PM CDT  To: Bradley Leonard Staff    Erendira would like a call back at [phone]  in regards to wanting to make an appointment for a follow up from the procedure. I attempted to schedule, first available for 7/31/24, he wants something sooner.  Thanks   Am

## 2024-06-27 NOTE — TELEPHONE ENCOUNTER
Returned call to patient. I read his discharge instructions. I instructed patient that he is to continue taking the Propafenone and the Ranolazine. I also instructed the patient that we would be calling him to make a follow up appointment. Patient verbalized understanding.

## 2024-06-28 DIAGNOSIS — Z95.818 PRESENCE OF WATCHMAN LEFT ATRIAL APPENDAGE CLOSURE DEVICE: ICD-10-CM

## 2024-06-28 DIAGNOSIS — I48.0 PAROXYSMAL ATRIAL FIBRILLATION: Primary | ICD-10-CM

## 2024-06-28 DIAGNOSIS — Z01.812 PRE-PROCEDURE LAB EXAM: ICD-10-CM

## 2024-06-28 DIAGNOSIS — I48.11 LONGSTANDING PERSISTENT ATRIAL FIBRILLATION: ICD-10-CM

## 2024-06-28 DIAGNOSIS — Z95.818 PRESENCE OF WATCHMAN LEFT ATRIAL APPENDAGE CLOSURE DEVICE: Primary | ICD-10-CM

## 2024-06-28 LAB
POC ACTIVATED CLOTTING TIME K: 171 SEC (ref 74–137)
POC ACTIVATED CLOTTING TIME K: 177 SEC (ref 74–137)
POC ACTIVATED CLOTTING TIME K: 360 SEC (ref 74–137)
POC ACTIVATED CLOTTING TIME K: 421 SEC (ref 74–137)
SAMPLE: ABNORMAL

## 2024-07-01 ENCOUNTER — PATIENT MESSAGE (OUTPATIENT)
Dept: ELECTROPHYSIOLOGY | Facility: CLINIC | Age: 79
End: 2024-07-01
Payer: MEDICARE

## 2024-07-01 ENCOUNTER — TELEPHONE (OUTPATIENT)
Dept: ELECTROPHYSIOLOGY | Facility: CLINIC | Age: 79
End: 2024-07-01
Payer: MEDICARE

## 2024-07-01 DIAGNOSIS — Z88.8 ALLERGY TO IODINE: Primary | ICD-10-CM

## 2024-07-01 RX ORDER — CIMETIDINE 300 MG/1
TABLET, FILM COATED ORAL
Qty: 3 TABLET | Refills: 0 | Status: CANCELLED | OUTPATIENT
Start: 2024-08-01

## 2024-07-01 RX ORDER — PREDNISONE 50 MG/1
TABLET ORAL
Qty: 3 TABLET | Refills: 0 | Status: CANCELLED | OUTPATIENT
Start: 2024-08-01

## 2024-07-01 NOTE — TELEPHONE ENCOUNTER
----- Message from Horacio Huerta MD sent at 6/28/2024  7:21 AM CDT -----  Contact: Erendira  I will see him when he comes for his Watchman. No clinic needed until after his Watchman is in thanks  ----- Message -----  From: Nicolle Ziegler MA  Sent: 6/27/2024   2:36 PM CDT  To: MD Dr. Bradley Denis.  You did an ablation on this pt., and the d/c summary stated that you are doing the watchman in 4-6 weeks.  Do you want to see the pt for a f/u now, or after his watchman placement?    Please adviseNicolle  ----- Message -----  From: Rachel Pena  Sent: 6/27/2024   1:21 PM CDT  To: Bradley Leonard Staff    Erendira would like a call back at [phone]  in regards to wanting to make an appointment for a follow up from the procedure. I attempted to schedule, first available for 7/31/24, he wants something sooner.  Thanks   Am

## 2024-07-02 DIAGNOSIS — Z79.4 TYPE 2 DIABETES MELLITUS WITH MICROALBUMINURIA, WITH LONG-TERM CURRENT USE OF INSULIN: Chronic | ICD-10-CM

## 2024-07-02 DIAGNOSIS — R80.9 TYPE 2 DIABETES MELLITUS WITH MICROALBUMINURIA, WITH LONG-TERM CURRENT USE OF INSULIN: Chronic | ICD-10-CM

## 2024-07-02 DIAGNOSIS — E78.5 HYPERLIPIDEMIA, UNSPECIFIED: ICD-10-CM

## 2024-07-02 DIAGNOSIS — Z88.8 ALLERGY TO IODINE: Primary | ICD-10-CM

## 2024-07-02 DIAGNOSIS — E11.29 TYPE 2 DIABETES MELLITUS WITH MICROALBUMINURIA, WITH LONG-TERM CURRENT USE OF INSULIN: Chronic | ICD-10-CM

## 2024-07-02 RX ORDER — PREDNISONE 50 MG/1
50 TABLET ORAL
COMMUNITY
Start: 2024-08-01 | End: 2024-07-02 | Stop reason: SDUPTHER

## 2024-07-02 RX ORDER — CIMETIDINE 300 MG/1
300 TABLET, FILM COATED ORAL
COMMUNITY
Start: 2024-08-01 | End: 2024-07-02 | Stop reason: SDUPTHER

## 2024-07-02 RX ORDER — INSULIN GLARGINE 100 [IU]/ML
INJECTION, SOLUTION SUBCUTANEOUS
Qty: 45 ML | Refills: 0 | Status: SHIPPED | OUTPATIENT
Start: 2024-07-02

## 2024-07-02 NOTE — TELEPHONE ENCOUNTER
No care due was identified.  [facility] Embedded Care Due Messages. Reference number: [id number].   7/02/2024 9:03:56 AM CDT

## 2024-07-02 NOTE — TELEPHONE ENCOUNTER
Refill Decision Note   Erendira Pretty  is requesting a refill authorization.  Brief Assessment and Rationale for Refill:  Approve     Medication Therapy Plan:         Comments:     Note composed:10:45 AM 07/02/2024             Appointments     Last Visit   5/13/2024 Bhupinder Callaway MD   Next Visit   Visit date not found Bhupinder Callaway MD

## 2024-07-03 RX ORDER — PREDNISONE 50 MG/1
50 TABLET ORAL DAILY
Qty: 3 TABLET | Refills: 0 | Status: SHIPPED | OUTPATIENT
Start: 2024-07-03

## 2024-07-03 RX ORDER — CIMETIDINE 300 MG/1
300 TABLET, FILM COATED ORAL 3 TIMES DAILY
Qty: 3 TABLET | Refills: 0 | Status: SHIPPED | OUTPATIENT
Start: 2024-07-03

## 2024-07-11 RX ORDER — EVOLOCUMAB 140 MG/ML
INJECTION, SOLUTION SUBCUTANEOUS
Qty: 2 ML | Refills: 11 | Status: SHIPPED | OUTPATIENT
Start: 2024-07-11

## 2024-07-15 ENCOUNTER — HOSPITAL ENCOUNTER (OUTPATIENT)
Facility: HOSPITAL | Age: 79
Discharge: HOME | End: 2024-07-16
Attending: EMERGENCY MEDICINE | Admitting: INTERNAL MEDICINE
Payer: MEDICARE

## 2024-07-15 ENCOUNTER — LAB VISIT (OUTPATIENT)
Dept: LAB | Facility: HOSPITAL | Age: 79
End: 2024-07-15
Attending: NURSE PRACTITIONER
Payer: MEDICARE

## 2024-07-15 DIAGNOSIS — I73.9 PAD (PERIPHERAL ARTERY DISEASE): ICD-10-CM

## 2024-07-15 DIAGNOSIS — D64.9 ANEMIA, UNSPECIFIED TYPE: ICD-10-CM

## 2024-07-15 DIAGNOSIS — R06.02 SHORTNESS OF BREATH: ICD-10-CM

## 2024-07-15 DIAGNOSIS — R07.9 CHEST PAIN: Primary | ICD-10-CM

## 2024-07-15 DIAGNOSIS — Z01.812 PRE-PROCEDURE LAB EXAM: ICD-10-CM

## 2024-07-15 DIAGNOSIS — D50.0 IRON DEFICIENCY ANEMIA DUE TO CHRONIC BLOOD LOSS: ICD-10-CM

## 2024-07-15 DIAGNOSIS — D64.9 NORMOCYTIC ANEMIA: ICD-10-CM

## 2024-07-15 DIAGNOSIS — D53.9 NUTRITIONAL ANEMIA, UNSPECIFIED: ICD-10-CM

## 2024-07-15 DIAGNOSIS — K92.1 MELENA: ICD-10-CM

## 2024-07-15 DIAGNOSIS — I48.0 PAROXYSMAL ATRIAL FIBRILLATION: ICD-10-CM

## 2024-07-15 PROBLEM — I25.118 CORONARY ARTERY DISEASE WITH EXERTIONAL ANGINA: Status: ACTIVE | Noted: 2024-07-15

## 2024-07-15 PROBLEM — R06.09 DOE (DYSPNEA ON EXERTION): Status: ACTIVE | Noted: 2024-07-15

## 2024-07-15 LAB
ALBUMIN SERPL BCP-MCNC: 3.7 G/DL (ref 3.5–5.2)
ALBUMIN SERPL BCP-MCNC: 3.7 G/DL (ref 3.5–5.2)
ALP SERPL-CCNC: 64 U/L (ref 55–135)
ALP SERPL-CCNC: 65 U/L (ref 55–135)
ALT SERPL W/O P-5'-P-CCNC: 13 U/L (ref 10–44)
ALT SERPL W/O P-5'-P-CCNC: 14 U/L (ref 10–44)
ANION GAP SERPL CALC-SCNC: 11 MMOL/L (ref 8–16)
ANION GAP SERPL CALC-SCNC: 8 MMOL/L (ref 8–16)
ANION GAP SERPL CALC-SCNC: 8 MMOL/L (ref 8–16)
APTT PPP: 31 SEC (ref 21–32)
APTT PPP: 34.2 SEC (ref 21–32)
AST SERPL-CCNC: 19 U/L (ref 10–40)
AST SERPL-CCNC: 20 U/L (ref 10–40)
BASOPHILS # BLD AUTO: 0.02 K/UL (ref 0–0.2)
BASOPHILS # BLD AUTO: 0.03 K/UL (ref 0–0.2)
BASOPHILS NFR BLD: 0.4 % (ref 0–1.9)
BASOPHILS NFR BLD: 0.6 % (ref 0–1.9)
BILIRUB SERPL-MCNC: 0.4 MG/DL (ref 0.1–1)
BILIRUB SERPL-MCNC: 0.4 MG/DL (ref 0.1–1)
BNP SERPL-MCNC: 193 PG/ML (ref 0–99)
BUN SERPL-MCNC: 16 MG/DL (ref 8–23)
CALCIUM SERPL-MCNC: 9 MG/DL (ref 8.7–10.5)
CALCIUM SERPL-MCNC: 9 MG/DL (ref 8.7–10.5)
CALCIUM SERPL-MCNC: 9.4 MG/DL (ref 8.7–10.5)
CHLORIDE SERPL-SCNC: 105 MMOL/L (ref 95–110)
CK SERPL-CCNC: 43 U/L (ref 20–200)
CO2 SERPL-SCNC: 24 MMOL/L (ref 23–29)
CO2 SERPL-SCNC: 25 MMOL/L (ref 23–29)
CO2 SERPL-SCNC: 25 MMOL/L (ref 23–29)
CREAT SERPL-MCNC: 1.2 MG/DL (ref 0.5–1.4)
CREAT SERPL-MCNC: 1.3 MG/DL (ref 0.5–1.4)
CREAT SERPL-MCNC: 1.3 MG/DL (ref 0.5–1.4)
DIFFERENTIAL METHOD BLD: ABNORMAL
DIFFERENTIAL METHOD BLD: ABNORMAL
EOSINOPHIL # BLD AUTO: 0.1 K/UL (ref 0–0.5)
EOSINOPHIL # BLD AUTO: 0.1 K/UL (ref 0–0.5)
EOSINOPHIL NFR BLD: 2.6 % (ref 0–8)
EOSINOPHIL NFR BLD: 2.6 % (ref 0–8)
ERYTHROCYTE [DISTWIDTH] IN BLOOD BY AUTOMATED COUNT: 20.6 % (ref 11.5–14.5)
ERYTHROCYTE [DISTWIDTH] IN BLOOD BY AUTOMATED COUNT: 20.6 % (ref 11.5–14.5)
ERYTHROCYTE [DISTWIDTH] IN BLOOD BY AUTOMATED COUNT: 20.7 % (ref 11.5–14.5)
EST. GFR  (NO RACE VARIABLE): 56 ML/MIN/1.73 M^2
EST. GFR  (NO RACE VARIABLE): 56 ML/MIN/1.73 M^2
EST. GFR  (NO RACE VARIABLE): >60 ML/MIN/1.73 M^2
FERRITIN SERPL-MCNC: 17 NG/ML (ref 20–300)
FOLATE SERPL-MCNC: 12.1 NG/ML (ref 4–24)
GLUCOSE SERPL-MCNC: 138 MG/DL (ref 70–110)
GLUCOSE SERPL-MCNC: 138 MG/DL (ref 70–110)
GLUCOSE SERPL-MCNC: 88 MG/DL (ref 70–110)
HCT VFR BLD AUTO: 28.3 % (ref 40–54)
HCT VFR BLD AUTO: 29.9 % (ref 40–54)
HCT VFR BLD AUTO: 29.9 % (ref 40–54)
HGB BLD-MCNC: 9 G/DL (ref 14–18)
HGB BLD-MCNC: 9.2 G/DL (ref 14–18)
HGB BLD-MCNC: 9.2 G/DL (ref 14–18)
IMM GRANULOCYTES # BLD AUTO: 0.01 K/UL (ref 0–0.04)
IMM GRANULOCYTES # BLD AUTO: 0.02 K/UL (ref 0–0.04)
IMM GRANULOCYTES NFR BLD AUTO: 0.2 % (ref 0–0.5)
IMM GRANULOCYTES NFR BLD AUTO: 0.4 % (ref 0–0.5)
INR PPP: 1.1 (ref 0.8–1.2)
IRON SERPL-MCNC: 26 UG/DL (ref 45–160)
LYMPHOCYTES # BLD AUTO: 0.9 K/UL (ref 1–4.8)
LYMPHOCYTES # BLD AUTO: 1 K/UL (ref 1–4.8)
LYMPHOCYTES NFR BLD: 18.8 % (ref 18–48)
LYMPHOCYTES NFR BLD: 21.1 % (ref 18–48)
MCH RBC QN AUTO: 27.4 PG (ref 27–31)
MCH RBC QN AUTO: 27.5 PG (ref 27–31)
MCH RBC QN AUTO: 27.5 PG (ref 27–31)
MCHC RBC AUTO-ENTMCNC: 30.8 G/DL (ref 32–36)
MCHC RBC AUTO-ENTMCNC: 30.8 G/DL (ref 32–36)
MCHC RBC AUTO-ENTMCNC: 31.8 G/DL (ref 32–36)
MCV RBC AUTO: 86 FL (ref 82–98)
MCV RBC AUTO: 89 FL (ref 82–98)
MCV RBC AUTO: 89 FL (ref 82–98)
MONOCYTES # BLD AUTO: 0.4 K/UL (ref 0.3–1)
MONOCYTES # BLD AUTO: 0.5 K/UL (ref 0.3–1)
MONOCYTES NFR BLD: 10.5 % (ref 4–15)
MONOCYTES NFR BLD: 8.8 % (ref 4–15)
NEUTROPHILS # BLD AUTO: 3 K/UL (ref 1.8–7.7)
NEUTROPHILS # BLD AUTO: 3.1 K/UL (ref 1.8–7.7)
NEUTROPHILS NFR BLD: 64.8 % (ref 38–73)
NEUTROPHILS NFR BLD: 69.2 % (ref 38–73)
NRBC BLD-RTO: 0 /100 WBC
NRBC BLD-RTO: 0 /100 WBC
PLATELET # BLD AUTO: 227 K/UL (ref 150–450)
PLATELET # BLD AUTO: 229 K/UL (ref 150–450)
PLATELET # BLD AUTO: 229 K/UL (ref 150–450)
PMV BLD AUTO: 10 FL (ref 9.2–12.9)
PMV BLD AUTO: 10.2 FL (ref 9.2–12.9)
PMV BLD AUTO: 10.2 FL (ref 9.2–12.9)
POCT GLUCOSE: 103 MG/DL (ref 70–110)
POTASSIUM SERPL-SCNC: 4.3 MMOL/L (ref 3.5–5.1)
POTASSIUM SERPL-SCNC: 4.4 MMOL/L (ref 3.5–5.1)
POTASSIUM SERPL-SCNC: 4.4 MMOL/L (ref 3.5–5.1)
PROT SERPL-MCNC: 7.1 G/DL (ref 6–8.4)
PROT SERPL-MCNC: 7.3 G/DL (ref 6–8.4)
PROTHROMBIN TIME: 12.6 SEC (ref 9–12.5)
RBC # BLD AUTO: 3.28 M/UL (ref 4.6–6.2)
RBC # BLD AUTO: 3.35 M/UL (ref 4.6–6.2)
RBC # BLD AUTO: 3.35 M/UL (ref 4.6–6.2)
SATURATED IRON: 6 % (ref 20–50)
SODIUM SERPL-SCNC: 138 MMOL/L (ref 136–145)
SODIUM SERPL-SCNC: 138 MMOL/L (ref 136–145)
SODIUM SERPL-SCNC: 140 MMOL/L (ref 136–145)
TOTAL IRON BINDING CAPACITY: 445 UG/DL (ref 250–450)
TRANSFERRIN SERPL-MCNC: 301 MG/DL (ref 200–375)
TROPONIN I SERPL DL<=0.01 NG/ML-MCNC: 0.01 NG/ML (ref 0–0.03)
VIT B12 SERPL-MCNC: 454 PG/ML (ref 210–950)
WBC # BLD AUTO: 4.53 K/UL (ref 3.9–12.7)
WBC # BLD AUTO: 4.53 K/UL (ref 3.9–12.7)
WBC # BLD AUTO: 4.65 K/UL (ref 3.9–12.7)

## 2024-07-15 PROCEDURE — 84484 ASSAY OF TROPONIN QUANT: CPT | Performed by: EMERGENCY MEDICINE

## 2024-07-15 PROCEDURE — 85025 COMPLETE CBC W/AUTO DIFF WBC: CPT | Performed by: NURSE PRACTITIONER

## 2024-07-15 PROCEDURE — 82607 VITAMIN B-12: CPT | Performed by: NURSE PRACTITIONER

## 2024-07-15 PROCEDURE — 84484 ASSAY OF TROPONIN QUANT: CPT | Mod: 91 | Performed by: EMERGENCY MEDICINE

## 2024-07-15 PROCEDURE — 36415 COLL VENOUS BLD VENIPUNCTURE: CPT | Mod: PO | Performed by: INTERNAL MEDICINE

## 2024-07-15 PROCEDURE — 93010 ELECTROCARDIOGRAM REPORT: CPT | Mod: ,,, | Performed by: INTERNAL MEDICINE

## 2024-07-15 PROCEDURE — 80053 COMPREHEN METABOLIC PANEL: CPT | Performed by: NURSE PRACTITIONER

## 2024-07-15 PROCEDURE — 63600175 PHARM REV CODE 636 W HCPCS: Performed by: NURSE PRACTITIONER

## 2024-07-15 PROCEDURE — 82550 ASSAY OF CK (CPK): CPT | Performed by: NURSE PRACTITIONER

## 2024-07-15 PROCEDURE — G0378 HOSPITAL OBSERVATION PER HR: HCPCS

## 2024-07-15 PROCEDURE — 83880 ASSAY OF NATRIURETIC PEPTIDE: CPT | Performed by: EMERGENCY MEDICINE

## 2024-07-15 PROCEDURE — 82746 ASSAY OF FOLIC ACID SERUM: CPT | Performed by: NURSE PRACTITIONER

## 2024-07-15 PROCEDURE — 25000003 PHARM REV CODE 250: Performed by: NURSE PRACTITIONER

## 2024-07-15 PROCEDURE — 85025 COMPLETE CBC W/AUTO DIFF WBC: CPT | Mod: 91 | Performed by: NURSE PRACTITIONER

## 2024-07-15 PROCEDURE — 93005 ELECTROCARDIOGRAM TRACING: CPT

## 2024-07-15 PROCEDURE — 99285 EMERGENCY DEPT VISIT HI MDM: CPT | Mod: 25

## 2024-07-15 PROCEDURE — 84484 ASSAY OF TROPONIN QUANT: CPT | Mod: 91 | Performed by: NURSE PRACTITIONER

## 2024-07-15 PROCEDURE — 85730 THROMBOPLASTIN TIME PARTIAL: CPT | Mod: 91 | Performed by: INTERNAL MEDICINE

## 2024-07-15 PROCEDURE — 82962 GLUCOSE BLOOD TEST: CPT

## 2024-07-15 PROCEDURE — 84466 ASSAY OF TRANSFERRIN: CPT | Performed by: NURSE PRACTITIONER

## 2024-07-15 PROCEDURE — 82728 ASSAY OF FERRITIN: CPT | Performed by: NURSE PRACTITIONER

## 2024-07-15 PROCEDURE — 85730 THROMBOPLASTIN TIME PARTIAL: CPT | Performed by: NURSE PRACTITIONER

## 2024-07-15 PROCEDURE — 80053 COMPREHEN METABOLIC PANEL: CPT | Mod: 91 | Performed by: EMERGENCY MEDICINE

## 2024-07-15 PROCEDURE — A4216 STERILE WATER/SALINE, 10 ML: HCPCS | Performed by: NURSE PRACTITIONER

## 2024-07-15 PROCEDURE — 85610 PROTHROMBIN TIME: CPT | Performed by: INTERNAL MEDICINE

## 2024-07-15 PROCEDURE — 96374 THER/PROPH/DIAG INJ IV PUSH: CPT

## 2024-07-15 RX ORDER — OXYBUTYNIN CHLORIDE 5 MG/1
10 TABLET, EXTENDED RELEASE ORAL DAILY
Status: DISCONTINUED | OUTPATIENT
Start: 2024-07-16 | End: 2024-07-16 | Stop reason: HOSPADM

## 2024-07-15 RX ORDER — LISINOPRIL 5 MG/1
10 TABLET ORAL DAILY
Status: DISCONTINUED | OUTPATIENT
Start: 2024-07-16 | End: 2024-07-16 | Stop reason: HOSPADM

## 2024-07-15 RX ORDER — FUROSEMIDE 40 MG/1
40 TABLET ORAL 2 TIMES DAILY
Status: DISCONTINUED | OUTPATIENT
Start: 2024-07-15 | End: 2024-07-16 | Stop reason: HOSPADM

## 2024-07-15 RX ORDER — LEVOTHYROXINE SODIUM 100 UG/1
300 TABLET ORAL EVERY MORNING
Status: DISCONTINUED | OUTPATIENT
Start: 2024-07-16 | End: 2024-07-16 | Stop reason: HOSPADM

## 2024-07-15 RX ORDER — ARFORMOTEROL TARTRATE 15 UG/2ML
15 SOLUTION RESPIRATORY (INHALATION) 2 TIMES DAILY
Status: DISCONTINUED | OUTPATIENT
Start: 2024-07-16 | End: 2024-07-16 | Stop reason: HOSPADM

## 2024-07-15 RX ORDER — ASPIRIN 81 MG/1
81 TABLET ORAL DAILY
Status: DISCONTINUED | OUTPATIENT
Start: 2024-07-16 | End: 2024-07-16 | Stop reason: HOSPADM

## 2024-07-15 RX ORDER — ONDANSETRON HYDROCHLORIDE 2 MG/ML
4 INJECTION, SOLUTION INTRAVENOUS EVERY 8 HOURS PRN
Status: DISCONTINUED | OUTPATIENT
Start: 2024-07-15 | End: 2024-07-16 | Stop reason: HOSPADM

## 2024-07-15 RX ORDER — FLUTICASONE FUROATE AND VILANTEROL 100; 25 UG/1; UG/1
1 POWDER RESPIRATORY (INHALATION) DAILY
Status: DISCONTINUED | OUTPATIENT
Start: 2024-07-16 | End: 2024-07-15 | Stop reason: CLARIF

## 2024-07-15 RX ORDER — FAMOTIDINE 20 MG/1
40 TABLET, FILM COATED ORAL DAILY
Status: DISCONTINUED | OUTPATIENT
Start: 2024-07-16 | End: 2024-07-16 | Stop reason: HOSPADM

## 2024-07-15 RX ORDER — CARVEDILOL 12.5 MG/1
25 TABLET ORAL 2 TIMES DAILY WITH MEALS
Status: DISCONTINUED | OUTPATIENT
Start: 2024-07-16 | End: 2024-07-16 | Stop reason: HOSPADM

## 2024-07-15 RX ORDER — ACETAMINOPHEN 325 MG/1
650 TABLET ORAL EVERY 4 HOURS PRN
Status: DISCONTINUED | OUTPATIENT
Start: 2024-07-15 | End: 2024-07-16 | Stop reason: HOSPADM

## 2024-07-15 RX ORDER — IPRATROPIUM BROMIDE AND ALBUTEROL SULFATE 2.5; .5 MG/3ML; MG/3ML
3 SOLUTION RESPIRATORY (INHALATION) EVERY 6 HOURS PRN
Status: DISCONTINUED | OUTPATIENT
Start: 2024-07-15 | End: 2024-07-16 | Stop reason: HOSPADM

## 2024-07-15 RX ORDER — ALBUTEROL SULFATE 90 UG/1
2 AEROSOL, METERED RESPIRATORY (INHALATION) EVERY 6 HOURS PRN
COMMUNITY

## 2024-07-15 RX ORDER — RANOLAZINE 500 MG/1
1000 TABLET, EXTENDED RELEASE ORAL 2 TIMES DAILY
Status: DISCONTINUED | OUTPATIENT
Start: 2024-07-15 | End: 2024-07-16 | Stop reason: HOSPADM

## 2024-07-15 RX ORDER — SODIUM CHLORIDE 0.9 % (FLUSH) 0.9 %
10 SYRINGE (ML) INJECTION EVERY 8 HOURS
Status: DISCONTINUED | OUTPATIENT
Start: 2024-07-15 | End: 2024-07-16 | Stop reason: HOSPADM

## 2024-07-15 RX ORDER — NALOXONE HCL 0.4 MG/ML
0.02 VIAL (ML) INJECTION
Status: DISCONTINUED | OUTPATIENT
Start: 2024-07-15 | End: 2024-07-16 | Stop reason: HOSPADM

## 2024-07-15 RX ORDER — PROPAFENONE HYDROCHLORIDE 150 MG/1
150 TABLET, COATED ORAL EVERY 8 HOURS
Status: DISCONTINUED | OUTPATIENT
Start: 2024-07-15 | End: 2024-07-16 | Stop reason: HOSPADM

## 2024-07-15 RX ORDER — FINASTERIDE 5 MG/1
5 TABLET, FILM COATED ORAL DAILY
Status: DISCONTINUED | OUTPATIENT
Start: 2024-07-16 | End: 2024-07-16 | Stop reason: HOSPADM

## 2024-07-15 RX ORDER — POLYETHYLENE GLYCOL 3350 17 G/17G
17 POWDER, FOR SOLUTION ORAL DAILY PRN
Status: DISCONTINUED | OUTPATIENT
Start: 2024-07-15 | End: 2024-07-16 | Stop reason: HOSPADM

## 2024-07-15 RX ORDER — INSULIN ASPART 100 [IU]/ML
0-10 INJECTION, SOLUTION INTRAVENOUS; SUBCUTANEOUS
Status: DISCONTINUED | OUTPATIENT
Start: 2024-07-15 | End: 2024-07-16 | Stop reason: HOSPADM

## 2024-07-15 RX ORDER — TALC
6 POWDER (GRAM) TOPICAL NIGHTLY PRN
Status: DISCONTINUED | OUTPATIENT
Start: 2024-07-15 | End: 2024-07-16 | Stop reason: HOSPADM

## 2024-07-15 RX ORDER — GLUCAGON 1 MG
1 KIT INJECTION
Status: DISCONTINUED | OUTPATIENT
Start: 2024-07-15 | End: 2024-07-16 | Stop reason: HOSPADM

## 2024-07-15 RX ORDER — IBUPROFEN 200 MG
24 TABLET ORAL
Status: DISCONTINUED | OUTPATIENT
Start: 2024-07-15 | End: 2024-07-16 | Stop reason: HOSPADM

## 2024-07-15 RX ORDER — IBUPROFEN 200 MG
16 TABLET ORAL
Status: DISCONTINUED | OUTPATIENT
Start: 2024-07-15 | End: 2024-07-16 | Stop reason: HOSPADM

## 2024-07-15 RX ORDER — BUDESONIDE 0.5 MG/2ML
0.5 INHALANT ORAL EVERY 12 HOURS
Status: DISCONTINUED | OUTPATIENT
Start: 2024-07-16 | End: 2024-07-16 | Stop reason: HOSPADM

## 2024-07-15 RX ORDER — MONTELUKAST SODIUM 10 MG/1
10 TABLET ORAL DAILY
Status: DISCONTINUED | OUTPATIENT
Start: 2024-07-16 | End: 2024-07-16 | Stop reason: HOSPADM

## 2024-07-15 RX ADMIN — IRON SUCROSE 200 MG: 20 INJECTION, SOLUTION INTRAVENOUS at 10:07

## 2024-07-15 RX ADMIN — FUROSEMIDE 40 MG: 40 TABLET ORAL at 10:07

## 2024-07-15 RX ADMIN — PROPAFENONE HYDROCHLORIDE 150 MG: 150 TABLET, FILM COATED ORAL at 10:07

## 2024-07-15 RX ADMIN — RANOLAZINE 1000 MG: 500 TABLET, EXTENDED RELEASE ORAL at 10:07

## 2024-07-15 RX ADMIN — Medication 10 ML: at 10:07

## 2024-07-15 NOTE — ED PROVIDER NOTES
Emergency Medicine Provider Note - 7/15/2024       SCRIBE NOTE: I, Nael Kerr, am scribing for, and in the presence of, * Brissa Manzanares DO, FACEP     History     Chief Complaint   Patient presents with    Shortness of Breath     Pt with positive cardiac history c/o SOB on exertion x 3 days, worsening.  He had a cardiac ablation last month.       Allergies:  Review of patient's allergies indicates:   Allergen Reactions    Lipitor [atorvastatin] Other (See Comments)     Muscle aches and pains as well as fatigue.     Niacin preparations Other (See Comments)     Causes broken blood vessels and bruises at 4 times normal dose.    Statins-hmg-coa reductase inhibitors Other (See Comments)     Statins cause intolerable myalgias.    Iodinated contrast media Hives     Tachycardia    Omeprazole Hives and Itching    Prilosec [omeprazole magnesium] Hives and Itching        History of Present Illness   HPI    7/15/2024, 6:17 PM  The history is provided by the patient    Erendira Pretty is a 79 y.o. male presenting to the ED for exertional shortness of breath as well as calf pain with exertion.  Patient has known history:  CAD status post CABG 2018, COPD, cirrhosis, hepatitis-C (2015, treated with Havoni), hypertension, hypothyroidism, obstructive sleep apnea on CPAP, osteoarthritis, esophageal varices, type 2 IDDM, aortic stenosis, BPH with nocturia, bilateral carotid artery stenosis, claudication, hyperlipidemia, glaucoma, peripheral vascular disease, and paroxysmal atrial fibrillation status post ablation procedure on Xarelto .  For the past 3 days, patient has noticed worsening shortness of breath with exertion.  He notes slight chest tightness with exertion, better with rest.  No associated nausea, vomiting, shortness of breath, or diaphoresis.  Pt also is complaining of bilateral leg pain with exertion.  Patient denies any discoloration of the toes.. Pt reports sxs are getting progressively worse the past few days but  got really bad this morning. Pt has a Hx of CABG.  Patient currently denies any calf pain or chest pain.      Arrival mode: Private Vehicle     PCP: Bhupinder Callaway MD     Past Medical History:  Past Medical History:   Diagnosis Date    Anticoagulant long-term use     Aortic stenosis     Asthma     Benign prostatic hyperplasia with nocturia     Bilateral carotid artery stenosis 07/21/2021    US CAROTID BILATERAL 07/14/2021 IMPRESSION: Atherosclerosis with suspected stenosis greater than 50% at the right proximal ICA visually. Velocities are discordant and appear improved from prior. Atherosclerosis on the left with no evidence of flow-limiting stenosis greater than 50%.  FINDINGS: Right: Internal Carotid Artery (ICA): Peak systolic velocity 118 cm/sec. 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 09/22/2020    DELONTE on CPAP     Osteoarthritis     Paroxysmal atrial fibrillation     PVD (peripheral vascular disease)     Secondary esophageal varices without bleeding 06/26/2017    Type 2 diabetes mellitus        Past Surgical History:  Past Surgical History:   Procedure Laterality Date    ABLATION OF ARRHYTHMOGENIC FOCUS FOR ATRIAL FIBRILLATION N/A 6/24/2024    Procedure: Ablation atrial fibrillation;  Surgeon: Horacio Huerta MD;  Location: [facility] EP LAB;  Service: Cardiology;  Laterality: N/A;  afib, PVI, RFA, BERNARD (cx if SR), ZIA, anes, MB, 3 Prep, 3 hours per Dr. Huerta    ANGIOGRAM, EXTREMITY, UNILATERAL Left 1/4/2024    Procedure:  ANGIOGRAM, EXTREMITY, UNILATERAL- Femoral / SMS Stent, Poss General;  Surgeon: ALEX Alfred III, MD;  Location: [facility] OR [facility] FLR;  Service: Vascular;  Laterality: Left;  mGy : 2698.78  Gy.cm : 229.88  Contrast : 62ml  Fluro time : 13.8min    CARDIAC CATHETERIZATION      CARDIAC SURGERY      CARPAL TUNNEL RELEASE Left     CATARACT EXTRACTION      CATARACT EXTRACTION W/ INTRAOCULAR LENS  IMPLANT, BILATERAL  2008    CATHETERIZATION OF BOTH LEFT AND RIGHT HEART N/A 11/2/2018    Procedure: CATHETERIZATION, HEART, BOTH LEFT AND RIGHT;  Surgeon: Frank Gallardo MD;  Location: [facility] CATH LAB;  Service: Cardiology;  Laterality: N/A;    COLONOSCOPY  8/2013    COLONOSCOPY N/A 5/30/2016    Procedure: COLONOSCOPY;  Surgeon: Anna Tomas MD;  Location: [facility] ENDO;  Service: Endoscopy;  Laterality: N/A;    COLONOSCOPY N/A 7/17/2019    Procedure: COLONOSCOPY;  Surgeon: David Carter III, MD;  Location: [facility] ENDO;  Service: Endoscopy;  Laterality: N/A;    COLONOSCOPY N/A 12/14/2023    Procedure: COLONOSCOPY;  Surgeon: Ama Barrera MD;  Location: [facility] ENDO;  Service: Endoscopy;  Laterality: N/A;    COMPUTED TOMOGRAPHY N/A 9/9/2019    Procedure: CT (COMPUTED TOMOGRAPHY);  Surgeon: [facility];  Location: [facility] OR;  Service: General;  Laterality: N/A;  Microwave ablation to be done in CT.  Need CRNA and cart    COMPUTED TOMOGRAPHY N/A 1/25/2022    Procedure: Liver Microwave Ablation;  Surgeon: Red Puentes MD;  Location: [facility];  Service: General;  Laterality: N/A;    CORONARY ARTERY BYPASS GRAFT (CABG) N/A 11/5/2018    Procedure: CORONARY ARTERY BYPASS GRAFT (CABG);  Surgeon: Bear De Luna MD;  Location: [facility] OR;  Service: Cardiothoracic;  Laterality: N/A;  TWO VESSEL BYPASS WITH AORTOTOMY AND EXCISION OF ATHEROSCLEROTIC PLAQUE    CORONARY BYPASS GRAFT ANGIOGRAPHY  3/2/2020    Procedure: Bypass graft study;  Surgeon: Cora Cormier MD;  Location: [facility] CATH LAB;  Service: Cardiology;;     ECHOCARDIOGRAM,TRANSESOPHAGEAL N/A 6/24/2024    Procedure: Transesophageal echo (BERNARD) intra-procedure log documentation;  Surgeon: Nacho Downs MD;  Location: [facility] EP LAB;  Service: Cardiology;  Laterality: N/A;    ELBOW BURSA SURGERY Right 2010    ENDOSCOPIC HARVEST OF VEIN Left 11/5/2018    Procedure: SURGICAL PROCUREMENT, VEIN, ENDOSCOPIC;  Surgeon: Bear De Luna MD;  Location: [facility] OR;  Service: Cardiothoracic;  Laterality: Left;    ESOPHAGOGASTRODUODENOSCOPY N/A 7/17/2019    Procedure: ESOPHAGOGASTRODUODENOSCOPY (EGD);  Surgeon: David Carter III, MD;  Location: [facility] ENDO;  Service: Endoscopy;  Laterality: N/A;    ESOPHAGOGASTRODUODENOSCOPY N/A 12/29/2022    Procedure: ESOPHAGOGASTRODUODENOSCOPY (EGD);  Surgeon: Issa Gayle MD;  Location: [facility] ENDO;  Service: Endoscopy;  Laterality: N/A;    ESOPHAGOGASTRODUODENOSCOPY N/A 1/17/2024    Procedure: EGD (ESOPHAGOGASTRODUODENOSCOPY);  Surgeon: Issa Gayle MD;  Location: [facility];  Service: Endoscopy;  Laterality: N/A;    ESOPHAGOGASTRODUODENOSCOPY N/A 4/30/2024    Procedure: EGD (ESOPHAGOGASTRODUODENOSCOPY);  Surgeon: Eder Foley MD;  Location: [facility];  Service: Gastroenterology;  Laterality: N/A;    ETHMOIDECTOMY Bilateral 3/27/2019    Procedure: ETHMOIDECTOMY;  Surgeon: Alejandro Parkinson MD;  Location: [facility] OR;  Service: ENT;  Laterality: Bilateral;    EYE SURGERY      FOOT SURGERY      FRONTAL SINUS OBLITERATION Bilateral 3/27/2019    Procedure: SINUSOTOMY, FRONTAL SINUS, OBLITERATIVE;  Surgeon: Alejandro Parkinson MD;  Location: [facility] OR;  Service: ENT;  Laterality: Bilateral;    FUNCTIONAL ENDOSCOPIC SINUS SURGERY (FESS) Bilateral 3/27/2019    Procedure: FESS (FUNCTIONAL ENDOSCOPIC SINUS SURGERY);  Surgeon: Alejandro Parkinson MD;  Location: [facility] OR;  Service: ENT;  Laterality: Bilateral;  Left Keila bullosa resection     INTRALUMINAL GASTROINTESTINAL TRACT IMAGING VIA CAPSULE N/A 2/2/2024    Procedure: IMAGING PROCEDURE, GI TRACT,  INTRALUMINAL, VIA CAPSULE;  Surgeon: Cooper Estrella RN;  Location: [facility] ENDO;  Service: Endoscopy;  Laterality: N/A;    KNEE ARTHROSCOPY W/ MENISCAL REPAIR Left 2019    dr boykin    KNEE SURGERY Right     LEFT HEART CATHETERIZATION Left 3/2/2020    Procedure: CATHETERIZATION, HEART, LEFT;  Surgeon: Cora Cormier MD;  Location: [facility] CATH LAB;  Service: Cardiology;  Laterality: Left;    LIVER BIOPSY  2022    MAXILLARY ANTROSTOMY Bilateral 3/27/2019    Procedure: MAXILLARY ANTROSTOMY;  Surgeon: Alejandro Parkinson MD;  Location: [facility] OR;  Service: ENT;  Laterality: Bilateral;    NASAL SEPTOPLASTY N/A 3/27/2019    Procedure: SEPTOPLASTY, NOSE;  Surgeon: Alejandro Parkinson MD;  Location: [facility] OR;  Service: ENT;  Laterality: N/A;    RECTAL SURGERY      STENT, SUPERIOR MESENTERIC ARTERY Left 2024    Procedure: STENT, SUPERIOR MESENTERIC ARTERY;  Surgeon: ALEX Alfred III, MD;  [address]  Service: Vascular;  Laterality: Left;    TRANSURETHRAL RESECTION OF PROSTATE (TURP) WITHOUT USE OF LASER N/A 2018    Procedure: TURP, WITHOUT USING LASER;  Surgeon: Cooper Cordova IV, MD;  Location: [facility] OR;  Service: Urology;  Laterality: N/A;    TRIGGER FINGER RELEASE Left          Family History:  Family History   Problem Relation Name Age of Onset    Heart disease Mother      Heart disease Father      Heart disease Brother      Colon cancer Neg Hx         Social History:  Social History     Tobacco Use    Smoking status: Former     Current packs/day: 0.00     Average packs/day: 0.5 packs/day for 4.0 years (2.0 ttl pk-yrs)     Types: Cigarettes     Start date: 1968     Quit date: 1972     Years since quittin.1    Smokeless tobacco: Former     Types: Chew     Quit date: 1976   Substance and Sexual Activity    Alcohol use: Not Currently    Drug use: No    Sexual activity: Yes     Partners: Female        Review of Systems   Review of Systems   Constitutional:  Negative for chills and fever.    Respiratory:  Positive for shortness of breath (Not currently in ED.  Brought on by exertion). Negative for cough.         (+) chest discomfort   Cardiovascular:  Positive for chest pain (Not currently in ED, brought on by exertion).   Gastrointestinal:  Negative for nausea.   Genitourinary:  Negative for dysuria.   Musculoskeletal:  Positive for myalgias (bilateral leg pain). Negative for back pain.   Skin:  Negative for rash.   Neurological:  Negative for weakness, light-headedness and headaches.   Hematological:  Does not bruise/bleed easily.   All other systems reviewed and are negative.       Physical Exam     Initial Vitals [07/15/24 1136]   BP Pulse Resp Temp SpO2   119/60 78 20 98.3 °F (36.8 °C) 99 %      MAP       --          Physical Exam    Nursing Notes and Vital Signs Reviewed.  Constitutional: Patient is in no acute distress. Well-developed and well-nourished.  Head: Atraumatic. Normocephalic.  Eyes: PERRL. EOM intact. Conjunctivae are not pale. No scleral icterus.  ENT: Mucous membranes are moist. Oropharynx is clear and symmetric.    Neck: Supple. Full ROM. No lymphadenopathy.  Cardiovascular: Regular rate. Regular rhythm. Pulses equal. No decrease in capillary refills. No JVD. No murmurs, rubs, or gallops. Distal pulses are 2+ and symmetric.  Pulmonary/Chest: No respiratory distress. Clear to auscultation bilaterally. No wheezing or rales.  Abdominal: Soft and non-distended.  There is no tenderness.  No rebound, guarding, or rigidity. Good bowel sounds.  Musculoskeletal: Moves all extremities. No obvious deformities. No edema. No calf tenderness.  Toes pink.  Cap refill less than 3 seconds.  No cyanosis or rubor.  Sensation intact.  Skin: Warm and dry.  Neurological:  Alert, awake, and appropriate.  Normal speech.  No acute focal neurological deficits are appreciated.  Psychiatric: Normal affect. Good eye contact. Appropriate in content.     ED Course   ED Procedures:  Procedures    ED Vital  "Signs:  Vitals:    07/15/24 2035 07/15/24 2105 07/15/24 2238 07/15/24 2243   BP: 136/68 (!) 143/66 130/69    Pulse: 71 78  72   Resp: 20 16     Temp:       TempSrc:       SpO2: 98% 98%     Weight:       Height:        07/15/24 2302 07/15/24 2303 07/15/24 2305 07/15/24 2306   BP: (!) 140/70 (!) 159/73 135/61 (!) 141/65   Pulse: 69 68 68 68   Resp: (!) 23 20 (!) 29 (!) 28   Temp:       TempSrc:       SpO2: 99% 98% 98% 99%   Weight:       Height:        07/15/24 2326 07/15/24 2337 07/15/24 2358 07/16/24 0351   BP: (!) 151/70      Pulse: 66  64 61   Resp: 18      Temp: 98 °F (36.7 °C)      TempSrc: Oral      SpO2: 100%      Weight:  98.4 kg (216 lb 14.9 oz)     Height:  5' 7" (1.702 m)      07/16/24 0440 07/16/24 0731 07/16/24 0752   BP: (!) 132/59 (!) 129/56    Pulse: 66 72 68   Resp: 16 18    Temp: 97.9 °F (36.6 °C) 98 °F (36.7 °C)    TempSrc: Oral Oral    SpO2: 98% 97%    Weight: 97 kg (213 lb 13.5 oz)     Height:          Abnormal Lab Results:  Labs Reviewed   CBC W/ AUTO DIFFERENTIAL - Abnormal; Notable for the following components:       Result Value    RBC 3.28 (*)     Hemoglobin 9.0 (*)     Hematocrit 28.3 (*)     MCHC 31.8 (*)     RDW 20.7 (*)     All other components within normal limits   B-TYPE NATRIURETIC PEPTIDE - Abnormal; Notable for the following components:     (*)     All other components within normal limits   TROPONIN I   COMPREHENSIVE METABOLIC PANEL   APTT   TROPONIN I   POCT GLUCOSE, HAND-HELD DEVICE   POCT GLUCOSE        All Lab Results:  Results for orders placed or performed during the hospital encounter of 07/15/24   CBC Auto Differential   Result Value Ref Range    WBC 4.65 3.90 - 12.70 K/uL    RBC 3.28 (L) 4.60 - 6.20 M/uL    Hemoglobin 9.0 (L) 14.0 - 18.0 g/dL    Hematocrit 28.3 (L) 40.0 - 54.0 %    MCV 86 82 - 98 fL    MCH 27.4 27.0 - 31.0 pg    MCHC 31.8 (L) 32.0 - 36.0 g/dL    RDW 20.7 (H) 11.5 - 14.5 %    Platelets 227 150 - 450 K/uL    MPV 10.0 9.2 - 12.9 fL    Immature " Granulocytes 0.4 0.0 - 0.5 %    Gran # (ANC) 3.0 1.8 - 7.7 K/uL    Immature Grans (Abs) 0.02 0.00 - 0.04 K/uL    Lymph # 1.0 1.0 - 4.8 K/uL    Mono # 0.5 0.3 - 1.0 K/uL    Eos # 0.1 0.0 - 0.5 K/uL    Baso # 0.03 0.00 - 0.20 K/uL    nRBC 0 0 /100 WBC    Gran % 64.8 38.0 - 73.0 %    Lymph % 21.1 18.0 - 48.0 %    Mono % 10.5 4.0 - 15.0 %    Eosinophil % 2.6 0.0 - 8.0 %    Basophil % 0.6 0.0 - 1.9 %    Differential Method Automated    Brain natriuretic peptide   Result Value Ref Range     (H) 0 - 99 pg/mL   Troponin I   Result Value Ref Range    Troponin I 0.007 0.000 - 0.026 ng/mL   Comprehensive metabolic panel   Result Value Ref Range    Sodium 140 136 - 145 mmol/L    Potassium 4.3 3.5 - 5.1 mmol/L    Chloride 105 95 - 110 mmol/L    CO2 24 23 - 29 mmol/L    Glucose 88 70 - 110 mg/dL    BUN 16 8 - 23 mg/dL    Creatinine 1.2 0.5 - 1.4 mg/dL    Calcium 9.4 8.7 - 10.5 mg/dL    Total Protein 7.1 6.0 - 8.4 g/dL    Albumin 3.7 3.5 - 5.2 g/dL    Total Bilirubin 0.4 0.1 - 1.0 mg/dL    Alkaline Phosphatase 64 55 - 135 U/L    AST 19 10 - 40 U/L    ALT 14 10 - 44 U/L    eGFR >60 >60 mL/min/1.73 m^2    Anion Gap 11 8 - 16 mmol/L   APTT   Result Value Ref Range    aPTT 31.0 21.0 - 32.0 sec   Troponin I   Result Value Ref Range    Troponin I 0.012 0.000 - 0.026 ng/mL   CK   Result Value Ref Range    CPK 43 20 - 200 U/L   Troponin I   Result Value Ref Range    Troponin I 0.010 0.000 - 0.026 ng/mL   Comprehensive Metabolic Panel (CMP)   Result Value Ref Range    Sodium 142 136 - 145 mmol/L    Potassium 3.9 3.5 - 5.1 mmol/L    Chloride 103 95 - 110 mmol/L    CO2 29 23 - 29 mmol/L    Glucose 93 70 - 110 mg/dL    BUN 13 8 - 23 mg/dL    Creatinine 1.2 0.5 - 1.4 mg/dL    Calcium 9.2 8.7 - 10.5 mg/dL    Total Protein 6.9 6.0 - 8.4 g/dL    Albumin 3.5 3.5 - 5.2 g/dL    Total Bilirubin 0.4 0.1 - 1.0 mg/dL    Alkaline Phosphatase 60 55 - 135 U/L    AST 17 10 - 40 U/L    ALT 11 10 - 44 U/L    eGFR >60 >60 mL/min/1.73 m^2    Anion Gap 10  8 - 16 mmol/L   CBC with Automated Differential   Result Value Ref Range    WBC 4.62 3.90 - 12.70 K/uL    RBC 3.34 (L) 4.60 - 6.20 M/uL    Hemoglobin 9.0 (L) 14.0 - 18.0 g/dL    Hematocrit 29.4 (L) 40.0 - 54.0 %    MCV 88 82 - 98 fL    MCH 26.9 (L) 27.0 - 31.0 pg    MCHC 30.6 (L) 32.0 - 36.0 g/dL    RDW 20.6 (H) 11.5 - 14.5 %    Platelets 207 150 - 450 K/uL    MPV 9.8 9.2 - 12.9 fL    Immature Granulocytes 0.2 0.0 - 0.5 %    Gran # (ANC) 3.0 1.8 - 7.7 K/uL    Immature Grans (Abs) 0.01 0.00 - 0.04 K/uL    Lymph # 0.8 (L) 1.0 - 4.8 K/uL    Mono # 0.6 0.3 - 1.0 K/uL    Eos # 0.2 0.0 - 0.5 K/uL    Baso # 0.04 0.00 - 0.20 K/uL    nRBC 0 0 /100 WBC    Gran % 64.8 38.0 - 73.0 %    Lymph % 18.0 18.0 - 48.0 %    Mono % 12.6 4.0 - 15.0 %    Eosinophil % 3.5 0.0 - 8.0 %    Basophil % 0.9 0.0 - 1.9 %    Differential Method Automated    Magnesium   Result Value Ref Range    Magnesium 2.1 1.6 - 2.6 mg/dL   Troponin I   Result Value Ref Range    Troponin I 0.013 0.000 - 0.026 ng/mL   EKG 12-lead (Chest Pain) Age >30   Result Value Ref Range    QRS Duration 86 ms    [facility] QTC Calculation 435 ms   POCT glucose   Result Value Ref Range    POCT Glucose 103 70 - 110 mg/dL     *Note: Due to a large number of results and/or encounters for the requested time period, some results have not been displayed. A complete set of results can be found in Results Review.      Reviewed Prior Labs:   Latest Reference Range & Units 05/03/24 08:51 05/11/24 07:57 06/06/24 07:11 06/17/24 08:43 07/15/24 08:03 07/15/24 12:06   Hemoglobin 14.0 - 18.0 g/dL 9.6 (L) 9.5 (L) 10.9 (L) 11.2 (L) 9.2 (L)  9.2 (L) 9.0 (L)   Hematocrit 40.0 - 54.0 % 31.5 (L) 30.2 (L) 36.2 (L) 34.8 (L) 29.9 (L)  29.9 (L) 28.3 (L)   (L): Data is abnormally low    The EKG was ordered, reviewed, and independently interpreted by the ED provider:      ECG Results              EKG 12-lead (Preliminary result)  Result time 07/16/24 09:05:38      Wet Read by Brissa Manzanares DO (07/16/24  09:05:38, [facility] - Emergency Dept., Emergency Medicine)    Rate 74 beats per minute.  Normal sinus rhythm.  First-degree AV block.  Normal axis.  No ST segment elevation.  No STEMI.                                     EKG 12-lead (Chest Pain) Age >30 (In process)        Collection Time Result Time QRS Duration [facility] QTC Calculation    07/15/24 11:37:44 07/15/24 12:45:28 86 435                     In process by Interface, Lab In [facility] (07/15/24 12:45:34)                   Narrative:    Test Reason : R07.9,    Vent. Rate : 074 BPM     Atrial Rate : 074 BPM     P-R Int : 368 ms          QRS Dur : 086 ms      QT Int : 392 ms       P-R-T Axes : 090 035 043 degrees     QTc Int : 435 ms    Sinus rhythm with 1st degree A-V block with Premature supraventricular  complexes  Cannot rule out Anterior infarct ,age undetermined  Abnormal ECG  When compared with ECG of 24-JUN-2024 15:12,  Premature supraventricular complexes are now Present    Referred By: [email or web address]   SELF           Confirmed By:                                     Imaging Results:  Imaging Results              X-Ray Chest 1 View (Final result)  Result time 07/15/24 12:25:16      Final result by Darrell Iglesias MD (07/15/24 12:25:16)                   Impression:      No acute abnormality.      Electronically signed by: Darrell Iglesias  Date:    07/15/2024  Time:    12:25               Narrative:    EXAMINATION:  XR CHEST 1 VIEW    CLINICAL HISTORY:  shortness of breath;    TECHNIQUE:  Single frontal view of the chest was performed.    COMPARISON:  None    FINDINGS:  Median sternotomy wires in place.    The lungs are clear, with normal appearance of pulmonary vasculature and no pleural effusion or pneumothorax.    The cardiac silhouette is normal in size. The hilar and mediastinal contours are unremarkable.    Bones are intact.                                       Type of Interpretation: ED Physician (Independently Interpreted).  Radiology Procedure Done:  Portable CXR.  Interpretation: Sternal wires present.  No infiltrate.  No pneumothorax          The Emergency Provider reviewed the vital signs and test results, which are outlined above.     ED Discussion   ED Medication(s):  Medications   sodium chloride 0.9% flush 10 mL (10 mLs Intravenous Given 7/16/24 0551)   albuterol-ipratropium 2.5 mg-0.5 mg/3 mL nebulizer solution 3 mL (has no administration in time range)   ondansetron injection 4 mg (has no administration in time range)   acetaminophen tablet 650 mg (has no administration in time range)   naloxone 0.4 mg/mL injection 0.02 mg (has no administration in time range)   glucose chewable tablet 16 g (has no administration in time range)   glucose chewable tablet 24 g (has no administration in time range)   glucagon (human recombinant) injection 1 mg (has no administration in time range)   polyethylene glycol packet 17 g (has no administration in time range)   insulin aspart U-100 pen 0-10 Units (has no administration in time range)   melatonin tablet 6 mg (has no administration in time range)   dextrose 10% bolus 125 mL 125 mL (has no administration in time range)   dextrose 10% bolus 250 mL 250 mL (has no administration in time range)   aspirin EC tablet 81 mg (has no administration in time range)   carvediloL tablet 25 mg (25 mg Oral Not Given 7/16/24 0745)   famotidine tablet 40 mg (has no administration in time range)   finasteride tablet 5 mg (has no administration in time range)   furosemide tablet 40 mg (40 mg Oral Given 7/15/24 2238)   levothyroxine tablet 300 mcg (300 mcg Oral Not Given 7/16/24 0700)   lisinopriL tablet 10 mg (has no administration in time range)   oxybutynin 24 hr tablet 10 mg (has no administration in time range)   montelukast tablet 10 mg (has no administration in time range)   propafenone tablet 150 mg (150 mg Oral Given 7/16/24 0551)   ranolazine 12 hr tablet 1,000 mg (1,000 mg Oral Given 7/15/24 2237)   rivaroxaban tablet 20 mg  (has no administration in time range)   budesonide nebulizer solution 0.5 mg (has no administration in time range)   arformoteroL nebulizer solution 15 mcg (has no administration in time range)   iron sucrose injection 200 mg (200 mg Intravenous Given 7/15/24 2239)            7:11 PM: Discussed case with Erika Wagner NP ([facility]). Erika Wagner NP agrees with current care and management of pt and accepts admission.   Admitting Service: Hospital Medicine  Admitting Physician: Dr. Garcia  Admit to: obs/tele     7:21 PM: Re-evaluated pt. I have discussed test results, shared treatment plan, and the need for admission with patient and family at bedside. Pt and family express understanding at this time and agree with all information. All questions answered. Pt and family have no further questions or concerns at this time. Pt is ready for admit.       MIPS Measures     Smoker? No     Hypertension: History of Hypertension: The patient has elevated blood pressure (higher than 120/80) while being treated in the ED but has a history of hypertension.       Medical Decision Making           Additional MDM:   Heart Score:    History:          Highly suspicious.  ECG:             Normal  Age:               >65 years  Risk factors: >= 3 risk factors or history of atherosclerotic disease  Troponin:       Less than or equal to normal limit  Heart Score = 6             Medical Decision Making  Differential diagnosis: Atypical ACS, congestive heart failure, symptomatic anemia, chronic peripheral vascular disease    ED course: EKG no STEMI.  First-degree AV block.  Chest x-ray no pleural effusion.  .  CMP normal.  Hemoglobin/hematocrit 9.0/28.3.  Patient denies any recent melena or hematochezia.  Previous history of GI bleed requiring transfusion.  Previous hemoglobin/hematocrit 11.2/34.8 on June 17, 2024 troponin 0.007, repeat 0.012.  Patient does have palpable pulses in his foot.  Sensation is intact.  JACKY right  leg 0.89.  JACKY left leg 0.85.  Patient's symptoms of exertional dyspnea and chest pain is concerning for ACS.  Patient did have normal nuclear stress test on May 23, 2024.  Our patient is high-risk, therefore decision was made to place patient in the hospital.  Hospital medicine consulted.    Amount and/or Complexity of Data Reviewed  External Data Reviewed: labs and notes.     Details: See ED course and HPI.  Labs: ordered. Decision-making details documented in ED Course.  Radiology: ordered and independent interpretation performed. Decision-making details documented in ED Course.  ECG/medicine tests: ordered and independent interpretation performed. Decision-making details documented in ED Course.    Risk  Decision regarding hospitalization.        Coding    Prescription Management: I performed a review of the patient's current Rx medication list as input by nursing staff.    Current Discharge Medication List        CONTINUE these medications which have NOT CHANGED    Details   albuterol (PROVENTIL HFA) 90 mcg/actuation inhaler Inhale 2 puffs into the lungs every 6 (six) hours as needed for Wheezing. Rescue      aspirin (ECOTRIN) 81 MG EC tablet Take 1 tablet (81 mg total) by mouth once daily.  Qty: 90 tablet, Refills: 3      budesonide-formoterol 160-4.5 mcg (SYMBICORT) 160-4.5 mcg/actuation HFAA INHALE 2 PUFFS INTO THE LUNGS TWO TIMES A DAY.  Qty: 10.2 g, Refills: 11    Associated Diagnoses: Mild intermittent asthma without complication      carvediloL (COREG) 12.5 MG tablet Take 2 tablets (25 mg total) by mouth 2 (two) times daily with meals.  Qty: 60 tablet, Refills: 2    Comments: .      cimetidine (TAGAMET) 300 MG tablet Take 1 tablet (300 mg total) by mouth 3 (three) times daily. Take 50mg 13 hours prior, 7 hours prior, and 1 hour prior to test.  Qty: 3 tablet, Refills: 0    Associated Diagnoses: Allergy to iodine      famotidine (PEPCID) 40 MG tablet Take 40 mg by mouth once daily.      finasteride  (PROSCAR) 5 mg tablet TAKE 1 TABLET BY MOUTH once daily  Qty: 90 tablet, Refills: 3    Associated Diagnoses: Benign prostatic hyperplasia with nocturia      furosemide (LASIX) 40 MG tablet Take 1 tablet (40 mg total) by mouth 2 (two) times daily.  Qty: 60 tablet, Refills: 11      GLUCOSAMINE HCL/CHONDR ROSARIO A NA (OSTEO BI-FLEX ORAL) Take 1 tablet by mouth 2 (two) times daily.       JARDIANCE 25 mg tablet TAKE 1 TABLET BY MOUTH EVERY DAY  Qty: 90 tablet, Refills: 0    Comments: This prescription was filled on 3/30/2024. Any refills authorized will be placed on file.  Associated Diagnoses: Type 2 diabetes mellitus with chronic kidney disease, with long-term current use of insulin, unspecified CKD stage      krill oil 500 mg Cap Take 1 capsule by mouth Daily.      LANTUS SOLOSTAR U-100 INSULIN 100 unit/mL (3 mL) InPn pen INJECT 44 UNITS UNDER THE SKIN EVERY EVENING  Qty: 45 mL, Refills: 0    Comments: This prescription was filled on 7/2/2024. Any refills authorized will be placed on file.  Associated Diagnoses: Type 2 diabetes mellitus with microalbuminuria, with long-term current use of insulin      levothyroxine (SYNTHROID) 300 MCG Tab Take 1 tablet (300 mcg total) by mouth every morning.  Qty: 90 tablet, Refills: 3    Comments: This prescription was filled on 2/8/2024. Any refills authorized will be placed on file.  Associated Diagnoses: Acquired hypothyroidism      lisinopriL 10 MG tablet Take 1 tablet by mouth once daily  Qty: 90 tablet, Refills: 3    Comments: This prescription was filled on 5/9/2024. Any refills authorized will be placed on file. - .      metFORMIN (GLUCOPHAGE-XR) 500 MG ER 24hr tablet Take 1 tablet (500 mg total) by mouth 2 (two) times daily with meals.  Qty: 180 tablet, Refills: 3      mirabegron (MYRBETRIQ) 25 mg Tb24 ER tablet Take 1 tablet (25 mg total) by mouth once daily.  Qty: 30 tablet, Refills: 11      montelukast (SINGULAIR) 10 mg tablet Take 1 tablet (10 mg total) by mouth once  "daily.  Qty: 90 tablet, Refills: 3    Comments: This prescription was filled on 12/13/2022. Any refills authorized will be placed on file.  Associated Diagnoses: Chronic pansinusitis      propafenone (RHTHYMOL) 150 MG Tab Take 1 tablet (150 mg total) by mouth every 8 (eight) hours.  Qty: 90 tablet, Refills: 11      RABEprazole (ACIPHEX) 20 mg tablet Take 2 tablets (40 mg total) by mouth once daily.  Qty: 60 tablet, Refills: 0    Comments: DISCONTINUE any prescription for this drug issued prior to 07/21/2021. Known to tolerate Aciphex.  Take 40 mg bid x 3 months from 4/30/2024 the decrease to 20 mg bid.  Associated Diagnoses: Gastroesophageal reflux disease without esophagitis      ranolazine (RANEXA) 1,000 mg Tb12 Take 1 tablet (1,000 mg total) by mouth 2 (two) times daily.  Qty: 60 tablet, Refills: 2      REPATHA SURECLICK 140 mg/mL PnIj INJECT 140mg subcutaneously every 14 days  Qty: 2 mL, Refills: 11    Comments: This prescription was filled on 7/2/2024. Any refills authorized will be placed on file.  Associated Diagnoses: Hyperlipidemia, unspecified      rivaroxaban (XARELTO) 20 mg Tab Take 20 mg by mouth daily with dinner or evening meal.      ACCU-CHEK GRACE PLUS METER Misc AS DIRECTED  Qty: 1 each, Refills: 0    Associated Diagnoses: Type 2 diabetes mellitus with microalbuminuria, with long-term current use of insulin      blood sugar diagnostic (ACCU-CHEK GRACE PLUS TEST STRP) Strp TEST THREE TIMES A DAY  Qty: 100 strip, Refills: 11      inclisiran (LEQVIO) 284 mg/1.5 mL Syrg subcutaneous injection Inject 1.5 mLs (284 mg total) into the skin every 3 (three) months.  Qty: 1.5 mL, Refills: 1      lancets (ACCU-CHEK FASTCLIX LANCET DRUM) Misc TEST TWO TIMES A DAY  Qty: 200 each, Refills: 5      pen needle, diabetic (BD ULTRA-FINE MINI PEN NEEDLE) 31 gauge x 3/16" Ndle USE AS DIRECTED  Qty: 100 each, Refills: 11    Comments: DISCONTINUE any prescription for this drug issued prior to 07/21/2021.  Associated " "Diagnoses: Type 2 diabetes mellitus with microalbuminuria, with long-term current use of insulin      sildenafiL (VIAGRA) 100 MG tablet Take 1/2 to 1 tablet by mouth one hour prior to intercourse. Max 100mg per day  Qty: 30 tablet, Refills: 11              Discussed case with:Hospital Medicine    Referrals:  No orders of the defined types were placed in this encounter.      Portions of this note may have been created with voice recognition software. Occasional "wrong-word" or "sound-a-like" substitutions may have occurred due to the inherent limitations of voice recognition software. Please, read the note carefully and recognize, using context, where substitutions have occurred.          Clinical Impression       ICD-10-CM ICD-9-CM   1. Chest pain  R07.9 786.50   2. Shortness of breath  R06.02 786.05   3. Anemia, unspecified type  D64.9 285.9   4. PAD (peripheral artery disease)  I73.9 443.9         ED Disposition     Disposition: obs/tele  Patient condition: Stable      Scribe Attestation:   Scribe #1: I performed the above scribed service and the documentation accurately describes the services I performed. I attest to the accuracy of the note.     Attending:   Physician Attestation Statement for Scribe #1: Brissa MESA DO, FACEP, personally performed the services described in this documentation, as scribed by Nael Kerr , in my presence, and it is both accurate and complete.                Brissa Manzanares,   07/16/24 0913    "

## 2024-07-15 NOTE — FIRST PROVIDER EVALUATION
Medical screening examination initiated.  I have conducted a focused provider triage encounter, findings are as follows:    Brief history of present illness:  Patient presents for shortness of breath    Vitals:    07/15/24 1136   BP: 119/60   BP Location: Right arm   Pulse: 78   Resp: 20   Temp: 98.3 °F (36.8 °C)   TempSrc: Oral   SpO2: 99%   Weight: 101.2 kg (223 lb 1.7 oz)       Pertinent physical exam:  No acute distress    Brief workup plan:  Cardiac workup    Preliminary workup initiated; this workup will be continued and followed by the physician or advanced practice provider that is assigned to the patient when roomed.

## 2024-07-16 VITALS
HEIGHT: 67 IN | DIASTOLIC BLOOD PRESSURE: 67 MMHG | RESPIRATION RATE: 18 BRPM | HEART RATE: 69 BPM | WEIGHT: 213.88 LBS | OXYGEN SATURATION: 99 % | BODY MASS INDEX: 33.57 KG/M2 | SYSTOLIC BLOOD PRESSURE: 134 MMHG | TEMPERATURE: 98 F

## 2024-07-16 DIAGNOSIS — Z76.89 ENCOUNTER TO ESTABLISH CARE: ICD-10-CM

## 2024-07-16 DIAGNOSIS — Z00.00 ROUTINE ADULT HEALTH MAINTENANCE: Primary | ICD-10-CM

## 2024-07-16 LAB
ALBUMIN SERPL BCP-MCNC: 3.5 G/DL (ref 3.5–5.2)
ALP SERPL-CCNC: 60 U/L (ref 55–135)
ALT SERPL W/O P-5'-P-CCNC: 11 U/L (ref 10–44)
ANION GAP SERPL CALC-SCNC: 10 MMOL/L (ref 8–16)
AST SERPL-CCNC: 17 U/L (ref 10–40)
BASOPHILS # BLD AUTO: 0.04 K/UL (ref 0–0.2)
BASOPHILS NFR BLD: 0.9 % (ref 0–1.9)
BILIRUB SERPL-MCNC: 0.4 MG/DL (ref 0.1–1)
BUN SERPL-MCNC: 13 MG/DL (ref 8–23)
CALCIUM SERPL-MCNC: 9.2 MG/DL (ref 8.7–10.5)
CHLORIDE SERPL-SCNC: 103 MMOL/L (ref 95–110)
CO2 SERPL-SCNC: 29 MMOL/L (ref 23–29)
CREAT SERPL-MCNC: 1.2 MG/DL (ref 0.5–1.4)
DIFFERENTIAL METHOD BLD: ABNORMAL
EOSINOPHIL # BLD AUTO: 0.2 K/UL (ref 0–0.5)
EOSINOPHIL NFR BLD: 3.5 % (ref 0–8)
ERYTHROCYTE [DISTWIDTH] IN BLOOD BY AUTOMATED COUNT: 20.6 % (ref 11.5–14.5)
EST. GFR  (NO RACE VARIABLE): >60 ML/MIN/1.73 M^2
GLUCOSE SERPL-MCNC: 93 MG/DL (ref 70–110)
HCT VFR BLD AUTO: 29.4 % (ref 40–54)
HGB BLD-MCNC: 9 G/DL (ref 14–18)
IMM GRANULOCYTES # BLD AUTO: 0.01 K/UL (ref 0–0.04)
IMM GRANULOCYTES NFR BLD AUTO: 0.2 % (ref 0–0.5)
LYMPHOCYTES # BLD AUTO: 0.8 K/UL (ref 1–4.8)
LYMPHOCYTES NFR BLD: 18 % (ref 18–48)
MAGNESIUM SERPL-MCNC: 2.1 MG/DL (ref 1.6–2.6)
MCH RBC QN AUTO: 26.9 PG (ref 27–31)
MCHC RBC AUTO-ENTMCNC: 30.6 G/DL (ref 32–36)
MCV RBC AUTO: 88 FL (ref 82–98)
MONOCYTES # BLD AUTO: 0.6 K/UL (ref 0.3–1)
MONOCYTES NFR BLD: 12.6 % (ref 4–15)
NEUTROPHILS # BLD AUTO: 3 K/UL (ref 1.8–7.7)
NEUTROPHILS NFR BLD: 64.8 % (ref 38–73)
NRBC BLD-RTO: 0 /100 WBC
PLATELET # BLD AUTO: 207 K/UL (ref 150–450)
PMV BLD AUTO: 9.8 FL (ref 9.2–12.9)
POCT GLUCOSE: 107 MG/DL (ref 70–110)
POTASSIUM SERPL-SCNC: 3.9 MMOL/L (ref 3.5–5.1)
PROT SERPL-MCNC: 6.9 G/DL (ref 6–8.4)
RBC # BLD AUTO: 3.34 M/UL (ref 4.6–6.2)
SODIUM SERPL-SCNC: 142 MMOL/L (ref 136–145)
TROPONIN I SERPL DL<=0.01 NG/ML-MCNC: 0.01 NG/ML (ref 0–0.03)
WBC # BLD AUTO: 4.62 K/UL (ref 3.9–12.7)

## 2024-07-16 PROCEDURE — 84484 ASSAY OF TROPONIN QUANT: CPT | Performed by: NURSE PRACTITIONER

## 2024-07-16 PROCEDURE — 99900035 HC TECH TIME PER 15 MIN (STAT)

## 2024-07-16 PROCEDURE — 99214 OFFICE O/P EST MOD 30 MIN: CPT | Mod: ,,,

## 2024-07-16 PROCEDURE — 25000003 PHARM REV CODE 250: Performed by: NURSE PRACTITIONER

## 2024-07-16 PROCEDURE — 36415 COLL VENOUS BLD VENIPUNCTURE: CPT | Performed by: NURSE PRACTITIONER

## 2024-07-16 PROCEDURE — 83735 ASSAY OF MAGNESIUM: CPT | Performed by: NURSE PRACTITIONER

## 2024-07-16 PROCEDURE — A4216 STERILE WATER/SALINE, 10 ML: HCPCS | Performed by: NURSE PRACTITIONER

## 2024-07-16 PROCEDURE — G0378 HOSPITAL OBSERVATION PER HR: HCPCS

## 2024-07-16 PROCEDURE — 80053 COMPREHEN METABOLIC PANEL: CPT | Performed by: NURSE PRACTITIONER

## 2024-07-16 PROCEDURE — 85025 COMPLETE CBC W/AUTO DIFF WBC: CPT | Performed by: NURSE PRACTITIONER

## 2024-07-16 RX ADMIN — Medication 10 ML: at 05:07

## 2024-07-16 RX ADMIN — PROPAFENONE HYDROCHLORIDE 150 MG: 150 TABLET, FILM COATED ORAL at 05:07

## 2024-07-16 NOTE — ASSESSMENT & PLAN NOTE
Serial glucose checks ordered  Order PRN sliding scale --moderate  Diabetic diet ordered  Holding metformin and Lantus for now if becomes hyperglycemic will restart Lantus

## 2024-07-16 NOTE — ASSESSMENT & PLAN NOTE
Chronic, controlled. Latest blood pressure and vitals reviewed-     Temp:  [97.9 °F (36.6 °C)-98 °F (36.7 °C)]   Pulse:  []   Resp:  [14-29]   BP: (129-162)/(56-77)   SpO2:  [97 %-100 %] .   Home meds for hypertension were reviewed and noted below.   Hypertension Medications               carvediloL (COREG) 12.5 MG tablet Take 2 tablets (25 mg total) by mouth 2 (two) times daily with meals.    furosemide (LASIX) 40 MG tablet Take 1 tablet (40 mg total) by mouth 2 (two) times daily.    lisinopriL 10 MG tablet Take 1 tablet by mouth once daily            While in the hospital, will manage blood pressure as follows; Continue home antihypertensive regimen    Will utilize p.r.n. blood pressure medication only if patient's blood pressure greater than 180/110 and he develops symptoms such as worsening chest pain or shortness of breath.

## 2024-07-16 NOTE — ASSESSMENT & PLAN NOTE
Likely due to severe peripheral vascular disease  Pain is immediately relieved with rest  Supportive care

## 2024-07-16 NOTE — HPI
Patient is a 79-year-old male with past medical history significant for asthma, CAD status post CABG 2018, COPD, cirrhosis, hepatitis-C (2015, treated with Havoni),  hypertension, hypothyroidism, obstructive sleep apnea on CPAP, osteoarthritis, esophageal varices, type 2 IDDM,  aortic stenosis, BPH with nocturia, bilateral carotid artery stenosis, claudication, hyperlipidemia, glaucoma, peripheral vascular disease, and paroxysmal atrial fibrillation status post ablation procedure on Xarelto  who presented to ED with complaint of shortness of breath for the past 3 days, occurring only with exertion.  He states that he used to walk about a mile a day, however for the past week he has been unable to walk without getting short of breath, also complaining of some chest pain/tightness and also of bilateral severe leg pain with walking.  His symptoms completely subside with rest.  He denies fever, chills, lightheadedness, dizziness, headache, abdominal pain, nausea, vomiting, diarrhea, dysuria, edema.  He states that the chest tightness is mild and goes away with rest.  Vital signs on arrival to ED temp 98.3°, heart rate 78, respirations 20, blood pressure 119/60, 99% SpO2 on room air.  While in ED blood pressure has risen to 150s to 160s.   Lab workup shows RBC 3.28, hemoglobin 9.0, hematocrit 28.3, platelets 227, iron level 26, TIBC 445, saturating iron 6, transferrin 301, ferritin 17, folate 12.1, vitamin B12 454, PT 12.6, INR 1.1, PTT 34.2, chemistry is normal, , troponin 0.007 trended up to 0.012.   EKG sinus rhythm with first-degree AV block, heart rate 74, premature supraventricular contractions noted, .  Chest x-ray demonstrates that lungs are clear. He was not given any medications while in ED.  Hospital Medicine team was consulted for admission due to chest pain and dyspnea on exertion.   Pre op complete. Questions answered. Resting comfortably. Call light in reach. Personal belongings placed in locker. Crutches placed in PP4.

## 2024-07-16 NOTE — ASSESSMENT & PLAN NOTE
Likely multifactorial given significant past medical history  Dyspnea occurs with walking, resolves with rest, no oxygen requirement  Does not appear fluid overloaded, CXR is clear  Continue home inhalational therapies, PRN nebs ordered

## 2024-07-16 NOTE — ASSESSMENT & PLAN NOTE
Patient's anemia is currently worsening. Has not received any PRBCs to date. Etiology likely d/t chronic blood loss, Iron deficiency, and chronic disease due to Chronic Kidney Disease  Current CBC reviewed-   Lab Results   Component Value Date    HGB 9.0 (L) 07/16/2024    HCT 29.4 (L) 07/16/2024     Monitor serial CBC and transfuse if patient becomes hemodynamically unstable, symptomatic or H/H drops below 7/21.  Iron level and ferritin levels are low  We will give IV iron supplementation while in hospital  -patient to follow up with PCP as soon as possible after discharge

## 2024-07-16 NOTE — HOSPITAL COURSE
79 yr old male presented to the ER with complaints of shortness of breath for the past 3 days, occurring only with exertion.   Lab workup shows RBC 3.28, hemoglobin 9.0, hematocrit 28.3, platelets 227, iron level 26, TIBC 445, saturating iron 6, transferrin 301, ferritin 17, folate 12.1, vitamin B12 454, PT 12.6, INR 1.1, PTT 34.2, chemistry is normal, , troponin 0.007 trended up to 0.012. EKG sinus rhythm with first-degree AV block, heart rate 74, premature supraventricular contractions noted, .   Chest x-ray demonstrates that lungs are clear     Cardiology consulted, no new recommendations at this time pt to follow up with Dr. Gallardo in clinic tomorrow 7/17/24.       Patient reports overall he is feeling much better, SOB has resolved and patient with able to ambulate down the hallway without experiencing SOB. Patient seen by cardiology and stable for discharge. Patient to follow up with cardiology Dr. Gallardo tomorrow in clinic. Patient discharged to continue home medications. Patient educated on importance of monitoring BP at home in a log and bringing log to follow up with PCP/cardiology for possible medication adjustments.     Patient seen and examined on the day of discharge.  All questions and concerns were addressed prior to discharge.    Face to face encounter with patient: 44 min

## 2024-07-16 NOTE — SUBJECTIVE & OBJECTIVE
Past Medical History:   Diagnosis Date    Anticoagulant long-term use     Aortic stenosis     Asthma     Benign prostatic hyperplasia with nocturia     Bilateral carotid artery stenosis 07/21/2021    US CAROTID BILATERAL 07/14/2021 IMPRESSION: Atherosclerosis with suspected stenosis greater than 50% at the right proximal ICA visually. Velocities are discordant and appear improved from prior. Atherosclerosis on the left with no evidence of flow-limiting stenosis greater than 50%.  FINDINGS: Right: Internal Carotid Artery (ICA): Peak systolic velocity 118 cm/sec. 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 Huerta    ANGIOGRAM, EXTREMITY, UNILATERAL Left 1/4/2024    Procedure: ANGIOGRAM, EXTREMITY, UNILATERAL- Femoral / SMS Stent, Poss General;  Surgeon: ALEX Alfred III, MD;  Location: [facility] OR [address] [place] [address];  Service: Vascular;  Laterality: Left;  mGy : 2698.78  Gy.cm : 229.88  Contrast :  62ml  Fluro time : 13.8min    CARDIAC CATHETERIZATION      CARDIAC SURGERY      CARPAL TUNNEL RELEASE Left     CATARACT EXTRACTION      CATARACT EXTRACTION W/ INTRAOCULAR LENS  IMPLANT, BILATERAL  2008    CATHETERIZATION OF BOTH LEFT AND RIGHT HEART N/A 11/2/2018    Procedure: CATHETERIZATION, HEART, BOTH LEFT AND RIGHT;  Surgeon: Frank Gallardo MD;  Location: [facility] CATH LAB;  Service: Cardiology;  Laterality: N/A;    COLONOSCOPY  8/2013    COLONOSCOPY N/A 5/30/2016    Procedure: COLONOSCOPY;  Surgeon: Anna Tomas MD;  Location: [facility] ENDO;  Service: Endoscopy;  Laterality: N/A;    COLONOSCOPY N/A 7/17/2019    Procedure: COLONOSCOPY;  Surgeon: David Carter III, MD;  Location: [facility] ENDO;  Service: Endoscopy;  Laterality: N/A;    COLONOSCOPY N/A 12/14/2023    Procedure: COLONOSCOPY;  Surgeon: Ama Barrera MD;  Location: [facility] ENDO;  Service: Endoscopy;  Laterality: N/A;    COMPUTED TOMOGRAPHY N/A 9/9/2019    Procedure: CT (COMPUTED TOMOGRAPHY);  Surgeon: [facility] Diagnostic Provider;  Location: [facility] OR;  Service: General;  Laterality: N/A;  Microwave ablation to be done in CT.  Need CRNA and cart    COMPUTED TOMOGRAPHY N/A 1/25/2022    Procedure: Liver Microwave Ablation;  Surgeon: Red Puentes MD;  Location: [facility];  Service: General;  Laterality: N/A;    CORONARY ARTERY BYPASS GRAFT (CABG) N/A 11/5/2018    Procedure: CORONARY ARTERY BYPASS GRAFT (CABG);  Surgeon: Bear De Luna MD;  Location: [facility];  Service: Cardiothoracic;  Laterality: N/A;  TWO VESSEL BYPASS WITH AORTOTOMY AND EXCISION OF ATHEROSCLEROTIC PLAQUE    CORONARY BYPASS GRAFT ANGIOGRAPHY  3/2/2020    Procedure: Bypass graft study;  Surgeon: Cora Cormier MD;  Location: [facility] CATH LAB;  Service: Cardiology;;    ECHOCARDIOGRAM,TRANSESOPHAGEAL N/A 6/24/2024    Procedure: Transesophageal echo (BERNARD) intra-procedure log documentation;  Surgeon: Nacho Downs MD;  Location: [facility] EP LAB;  Service: Cardiology;  Laterality: N/A;     ELBOW BURSA SURGERY Right 2010    ENDOSCOPIC HARVEST OF VEIN Left 11/5/2018    Procedure: SURGICAL PROCUREMENT, VEIN, ENDOSCOPIC;  Surgeon: Bear De Luna MD;  Location: [facility];  Service: Cardiothoracic;  Laterality: Left;    ESOPHAGOGASTRODUODENOSCOPY N/A 7/17/2019    Procedure: ESOPHAGOGASTRODUODENOSCOPY (EGD);  Surgeon: David Carter III, MD;  Location: [facility];  Service: Endoscopy;  Laterality: N/A;    ESOPHAGOGASTRODUODENOSCOPY N/A 12/29/2022    Procedure: ESOPHAGOGASTRODUODENOSCOPY (EGD);  Surgeon: Issa Gayle MD;  Location: [facility];  Service: Endoscopy;  Laterality: N/A;    ESOPHAGOGASTRODUODENOSCOPY N/A 1/17/2024    Procedure: EGD (ESOPHAGOGASTRODUODENOSCOPY);  Surgeon: Issa Gayle MD;  Location: [facility];  Service: Endoscopy;  Laterality: N/A;    ESOPHAGOGASTRODUODENOSCOPY N/A 4/30/2024    Procedure: EGD (ESOPHAGOGASTRODUODENOSCOPY);  Surgeon: Eder Foley MD;  Location: [facility];  Service: Gastroenterology;  Laterality: N/A;    ETHMOIDECTOMY Bilateral 3/27/2019    Procedure: ETHMOIDECTOMY;  Surgeon: Alejandro Parkinson MD;  Location: [facility];  Service: ENT;  Laterality: Bilateral;    EYE SURGERY      FOOT SURGERY      FRONTAL SINUS OBLITERATION Bilateral 3/27/2019    Procedure: SINUSOTOMY, FRONTAL SINUS, OBLITERATIVE;  Surgeon: Alejandro Parkinson MD;  Location: [facility] OR;  Service: ENT;  Laterality: Bilateral;    FUNCTIONAL ENDOSCOPIC SINUS SURGERY (FESS) Bilateral 3/27/2019    Procedure: FESS (FUNCTIONAL ENDOSCOPIC SINUS SURGERY);  Surgeon: Alejandro Parkinson MD;  Location: [facility] OR;  Service: ENT;  Laterality: Bilateral;  Left Keila bullosa resection     INTRALUMINAL GASTROINTESTINAL TRACT IMAGING VIA CAPSULE N/A 2/2/2024    Procedure: IMAGING PROCEDURE, GI TRACT, INTRALUMINAL, VIA CAPSULE;  Surgeon: Cooper Estrella RN;  Location: [facility];  Service: Endoscopy;  Laterality: N/A;    KNEE ARTHROSCOPY W/ MENISCAL REPAIR Left 06/2019    dr boykin    KNEE SURGERY Right     LEFT HEART  CATHETERIZATION Left 3/2/2020    Procedure: CATHETERIZATION, HEART, LEFT;  Surgeon: Cora Cormier MD;  Location: [facility] CATH LAB;  Service: Cardiology;  Laterality: Left;    LIVER BIOPSY  01/2022    MAXILLARY ANTROSTOMY Bilateral 3/27/2019    Procedure: MAXILLARY ANTROSTOMY;  Surgeon: Alejandro Parkinson MD;  Location: [facility] OR;  Service: ENT;  Laterality: Bilateral;    NASAL SEPTOPLASTY N/A 3/27/2019    Procedure: SEPTOPLASTY, NOSE;  Surgeon: Alejandro Parkinson MD;  Location: [facility];  Service: ENT;  Laterality: N/A;    RECTAL SURGERY  2012    STENT, SUPERIOR MESENTERIC ARTERY Left 1/4/2024    Procedure: STENT, SUPERIOR MESENTERIC ARTERY;  Surgeon: ALEX Alfred III, MD;  Location: [address];  Service: Vascular;  Laterality: Left;    TRANSURETHRAL RESECTION OF PROSTATE (TURP) WITHOUT USE OF LASER N/A 6/19/2018    Procedure: TURP, WITHOUT USING LASER;  Surgeon: Cooper Cordova IV, MD;  Location: [facility] OR;  Service: Urology;  Laterality: N/A;    TRIGGER FINGER RELEASE Left        Review of patient's allergies indicates:   Allergen Reactions    Lipitor [atorvastatin] Other (See Comments)     Muscle aches and pains as well as fatigue.     Niacin preparations Other (See Comments)     Causes broken blood vessels and bruises at 4 times normal dose.    Statins-hmg-coa reductase inhibitors Other (See Comments)     Statins cause intolerable myalgias.    Iodinated contrast media Hives     Tachycardia    Omeprazole Hives and Itching    Prilosec [omeprazole magnesium] Hives and Itching       Current Facility-Administered Medications on File Prior to Encounter   Medication    lactated ringers infusion     Current Outpatient Medications on File Prior to Encounter   Medication Sig    albuterol (PROVENTIL HFA) 90 mcg/actuation inhaler Inhale 2 puffs into the lungs every 6 (six) hours as needed for Wheezing. Rescue    aspirin (ECOTRIN) 81 MG EC tablet Take 1 tablet (81 mg total) by mouth once daily.    budesonide-formoterol 160-4.5 mcg  (SYMBICORT) 160-4.5 mcg/actuation HFAA INHALE 2 PUFFS INTO THE LUNGS TWO TIMES A DAY. (Patient taking differently: Inhale 2 puffs into the lungs every 12 (twelve) hours. INHALE 2 PUFFS INTO THE LUNGS TWO TIMES A DAY.)    carvediloL (COREG) 12.5 MG tablet Take 2 tablets (25 mg total) by mouth 2 (two) times daily with meals.    cimetidine (TAGAMET) 300 MG tablet Take 1 tablet (300 mg total) by mouth 3 (three) times daily. Take 50mg 13 hours prior, 7 hours prior, and 1 hour prior to test.    famotidine (PEPCID) 40 MG tablet Take 40 mg by mouth once daily.    finasteride (PROSCAR) 5 mg tablet TAKE 1 TABLET BY MOUTH once daily (Patient taking differently: Take 5 mg by mouth once daily.)    furosemide (LASIX) 40 MG tablet Take 1 tablet (40 mg total) by mouth 2 (two) times daily.    GLUCOSAMINE HCL/CHONDR ROSARIO A NA (OSTEO BI-FLEX ORAL) Take 1 tablet by mouth 2 (two) times daily.     JARDIANCE 25 mg tablet TAKE 1 TABLET BY MOUTH EVERY DAY (Patient taking differently: Take 25 mg by mouth once daily.)    krill oil 500 mg Cap Take 1 capsule by mouth Daily.    LANTUS SOLOSTAR U-100 INSULIN 100 unit/mL (3 mL) InPn pen INJECT 44 UNITS UNDER THE SKIN EVERY EVENING    levothyroxine (SYNTHROID) 300 MCG Tab Take 1 tablet (300 mcg total) by mouth every morning.    lisinopriL 10 MG tablet Take 1 tablet by mouth once daily    metFORMIN (GLUCOPHAGE-XR) 500 MG ER 24hr tablet Take 1 tablet (500 mg total) by mouth 2 (two) times daily with meals.    mirabegron (MYRBETRIQ) 25 mg Tb24 ER tablet Take 1 tablet (25 mg total) by mouth once daily.    montelukast (SINGULAIR) 10 mg tablet Take 1 tablet (10 mg total) by mouth once daily.    propafenone (RHTHYMOL) 150 MG Tab Take 1 tablet (150 mg total) by mouth every 8 (eight) hours.    RABEprazole (ACIPHEX) 20 mg tablet Take 2 tablets (40 mg total) by mouth once daily.    ranolazine (RANEXA) 1,000 mg Tb12 Take 1 tablet (1,000 mg total) by mouth 2 (two) times daily.    REPATHA SURECLICK 140 mg/mL PnIj  "INJECT 140mg subcutaneously every 14 days (Patient taking differently: Inject 140 mg into the skin every 14 (fourteen) days.)    rivaroxaban (XARELTO) 20 mg Tab Take 20 mg by mouth daily with dinner or evening meal.    ACCU-CHEK GRACE PLUS METER Misc AS DIRECTED    blood sugar diagnostic (ACCU-CHEK GRACE PLUS TEST STRP) Strp TEST THREE TIMES A DAY    inclisiran (LEQVIO) 284 mg/1.5 mL Syrg subcutaneous injection Inject 1.5 mLs (284 mg total) into the skin every 3 (three) months.    lancets (ACCU-CHEK FASTCLIX LANCET DRUM) Misc TEST TWO TIMES A DAY    pen needle, diabetic (BD ULTRA-FINE MINI PEN NEEDLE) 31 gauge x 3/16" Ndle USE AS DIRECTED    sildenafiL (VIAGRA) 100 MG tablet Take 1/2 to 1 tablet by mouth one hour prior to intercourse. Max 100mg per day    [DISCONTINUED] inclisiran (LEQVIO) 284 mg/1.5 mL Syrg subcutaneous injection Inject 1.5 mLs (284 mg total) into the skin every 6 (six) months.    [DISCONTINUED] levocetirizine (XYZAL) 5 MG tablet Take 1 tablet (5 mg total) by mouth every evening.    [DISCONTINUED] levothyroxine (SYNTHROID) 50 MCG tablet Take 1 tablet (50 mcg total) by mouth before breakfast.    [DISCONTINUED] multivitamin (THERAGRAN) per tablet Take 1 tablet by mouth once daily.    [DISCONTINUED] predniSONE (DELTASONE) 50 MG Tab Take 1 tablet (50 mg total) by mouth once daily. Take 50mg 13 hours prior, 7 hours prior, and 1 hour prior to test.     Family History       Problem Relation (Age of Onset)    Heart disease Mother, Father, Brother          Tobacco Use    Smoking status: Former     Current packs/day: 0.00     Average packs/day: 0.5 packs/day for 4.0 years (2.0 ttl pk-yrs)     Types: Cigarettes     Start date: 1968     Quit date: 1972     Years since quittin.1    Smokeless tobacco: Former     Types: Chew     Quit date: 1976   Substance and Sexual Activity    Alcohol use: Not Currently    Drug use: No    Sexual activity: Yes     Partners: Female     Review of Systems "   Constitutional:  Positive for activity change. Negative for chills, diaphoresis, fatigue and fever.        CP and SOB with walking   HENT: Negative.     Eyes: Negative.    Respiratory:  Positive for chest tightness and shortness of breath. Negative for cough and wheezing.    Cardiovascular:  Positive for chest pain. Negative for palpitations and leg swelling.        Intermittent chest pain/tightness --only occurring with exertion/walking   Gastrointestinal: Negative.  Negative for abdominal distention, abdominal pain, nausea and vomiting.   Endocrine: Negative.    Genitourinary:  Negative for dysuria, flank pain and hematuria.        +nocturia   Musculoskeletal:  Positive for gait problem.        Leg pain bilaterally with walking/standing too long -- described as severe, resolves with rest   Skin: Negative.    Allergic/Immunologic: Negative.    Neurological:  Negative for dizziness, syncope, weakness, light-headedness, numbness and headaches.   Hematological: Negative.    Psychiatric/Behavioral: Negative.     All other systems reviewed and are negative.    Objective:     Vital Signs (Most Recent):  Temp: 98 °F (36.7 °C) (07/15/24 1830)  Pulse: 70 (07/15/24 1908)  Resp: 16 (07/15/24 1908)  BP: (!) 162/77 (07/15/24 1908)  SpO2: 100 % (07/15/24 1908) Vital Signs (24h Range):  Temp:  [98 °F (36.7 °C)-98.3 °F (36.8 °C)] 98 °F (36.7 °C)  Pulse:  [68-78] 70  Resp:  [14-26] 16  SpO2:  [97 %-100 %] 100 %  BP: (119-162)/(60-77) 162/77     Weight: 101.2 kg (223 lb 1.7 oz)  Body mass index is 34.94 kg/m².     Physical Exam  Vitals reviewed.   Constitutional:       General: He is not in acute distress.     Appearance: He is obese. He is not ill-appearing, toxic-appearing or diaphoretic.   HENT:      Head: Normocephalic and atraumatic.      Nose: Nose normal.      Mouth/Throat:      Mouth: Mucous membranes are moist.      Pharynx: Oropharynx is clear.   Eyes:      Extraocular Movements: Extraocular movements intact.       Pupils: Pupils are equal, round, and reactive to light.   Cardiovascular:      Rate and Rhythm: Normal rate and regular rhythm.      Pulses: Normal pulses.      Heart sounds: Murmur heard.   Pulmonary:      Effort: Pulmonary effort is normal. No respiratory distress.      Breath sounds: Normal breath sounds. No stridor. No wheezing, rhonchi or rales.   Chest:      Chest wall: No tenderness.   Abdominal:      General: Bowel sounds are normal. There is no distension.      Palpations: Abdomen is soft.      Tenderness: There is no abdominal tenderness. There is no right CVA tenderness, left CVA tenderness or guarding.   Musculoskeletal:         General: No swelling, tenderness, deformity or signs of injury. Normal range of motion.      Cervical back: Normal range of motion and neck supple.      Right lower leg: No edema.      Left lower leg: No edema.   Skin:     General: Skin is warm and dry.      Capillary Refill: Capillary refill takes less than 2 seconds.   Neurological:      General: No focal deficit present.      Mental Status: He is alert and oriented to person, place, and time.      Motor: No weakness.   Psychiatric:         Mood and Affect: Mood normal.         Behavior: Behavior normal.         Thought Content: Thought content normal.              CRANIAL NERVES     CN III, IV, VI   Pupils are equal, round, and reactive to light.       Significant Labs: All pertinent labs within the past 24 hours have been reviewed.    CBC:   Recent Labs   Lab 07/15/24  0803 07/15/24  1206   WBC 4.53  4.53 4.65   HGB 9.2*  9.2* 9.0*   HCT 29.9*  29.9* 28.3*     229 227     Iron studies:   iron level 26, TIBC 445, saturating iron 6, transferrin 301, ferritin 17, folate 12.1, vitamin B12 454    CMP:   Recent Labs   Lab 07/15/24  0803 07/15/24  1253     138 140   K 4.4  4.4 4.3     105 105   CO2 25  25 24   *  138* 88   BUN 16  16 16   CREATININE 1.3  1.3 1.2   CALCIUM 9.0  9.0 9.4   PROT 7.3  7.1   ALBUMIN 3.7 3.7   BILITOT 0.4 0.4   ALKPHOS 65 64   AST 20 19   ALT 13 14   ANIONGAP 8  8 11       Troponin:   Recent Labs   Lab 07/15/24  1253 07/15/24  1917   TROPONINI 0.007 0.012     PT 12.6, INR 1.1, PTT 31.0      EKG reviewed sinus rhythm with first-degree AV block, heart rate 74, premature supraventricular contractions noted,     Significant Imaging: I have reviewed all pertinent imaging results/findings within the past 24 hours.    Chest x-ray reviewed, per my interpretation lungs are clear

## 2024-07-16 NOTE — ASSESSMENT & PLAN NOTE
Patient's anemia is currently worsening. Has not received any PRBCs to date. Etiology likely d/t chronic blood loss, Iron deficiency, and chronic disease due to Chronic Kidney Disease  Current CBC reviewed-   Lab Results   Component Value Date    HGB 9.0 (L) 07/15/2024    HCT 28.3 (L) 07/15/2024     Monitor serial CBC and transfuse if patient becomes hemodynamically unstable, symptomatic or H/H drops below 7/21.  Iron level and ferritin levels are low  We will give IV iron supplementation while in hospital

## 2024-07-16 NOTE — CONSULTS
[facility]  Cardiology  Consult Note    Patient Name: Erendira Pretty  MRN: [id number]  Admission Date: 7/15/2024  Hospital Length of Stay: 0 days  Code Status: Full Code   Attending Provider: Valarie Nichole MD   Consulting Provider: Sandi Arias NP  Primary Care Physician: Bhupinder Callaway MD  Principal Problem:Coronary artery disease with exertional angina    Patient information was obtained from patient and ER records.     Inpatient consult to Cardiology  Consult performed by: Sandi Arias NP  Consult ordered by: Erika Wagner NP        Subjective:     Chief Complaint:  SOB     HPI:   Patient is a 79-year-old male with past medical history significant for asthma, CAD status post CABG 2018, COPD, cirrhosis, hepatitis-C (2015, treated with Havoni), hypertension, hypothyroidism, obstructive sleep apnea on CPAP, osteoarthritis, esophageal varices, type 2 IDDM, aortic stenosis, BPH with nocturia, bilateral carotid artery stenosis, claudication, hyperlipidemia, glaucoma, peripheral vascular disease, and paroxysmal atrial fibrillation status post ablation procedure on Xarelto who presented to ED with complaint of shortness of breath for the past 3 days, occurring only with exertion. He states that he used to walk about a mile a day, however for the past week he has been unable to walk without getting short of breath, also complaining of some chest pain/tightness and also of bilateral severe leg pain with walking. His symptoms completely subside with rest. He denies fever, chills, lightheadedness, dizziness, headache, abdominal pain, nausea, vomiting, diarrhea, dysuria, edema. He states that the chest tightness is mild and goes away with rest. Vital signs on arrival to ED temp 98.3°, heart rate 78, respirations 20, blood pressure 119/60, 99% SpO2 on room air. While in ED blood pressure has risen to 150s to 160s. Lab workup shows RBC 3.28, hemoglobin 9.0, hematocrit 28.3, platelets 227,  iron level 26, TIBC 445, saturating iron 6, transferrin 301, ferritin 17, folate 12.1, vitamin B12 454, PT 12.6, INR 1.1, PTT 34.2, chemistry is normal, , troponin 0.007 trended up to 0.012. EKG sinus rhythm with first-degree AV block, heart rate 74, premature supraventricular contractions noted, . Chest x-ray demonstrates that lungs are clear. He was not given any medications while in ED. Hospital Medicine team was consulted for admission due to chest pain and dyspnea on exertion.     Cardiology consulted to assist with management. Pt seen and examined today, had some MARCUS and musculoskeletal pains. Tele monitor with Afib rate controlled, on xarelto. June 24' Nuc neg for ischemia and echo EF nml Grade II DD. Labs reviewed, troponin neg, Crt 1.2. Iron level 26, TIBC 445. Planned for watchman procedure with Dr. Huerta    Past Medical History:   Diagnosis Date    Anticoagulant long-term use     Aortic stenosis     Asthma     Benign prostatic hyperplasia with nocturia     Bilateral carotid artery stenosis 07/21/2021     CAROTID BILATERAL 07/14/2021 IMPRESSION: Atherosclerosis with suspected stenosis greater than 50% at the right proximal ICA visually. Velocities are discordant and appear improved from prior. Atherosclerosis on the left with no evidence of flow-limiting stenosis greater than 50%.  FINDINGS: Right: Internal Carotid Artery (ICA): Peak systolic velocity 118 cm/sec. 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  09/22/2020    DELONTE on CPAP     Osteoarthritis     Paroxysmal atrial fibrillation     PVD (peripheral vascular disease)     Secondary esophageal varices without bleeding 06/26/2017    Type 2 diabetes mellitus        Past Surgical History:   Procedure Laterality Date    ABLATION OF ARRHYTHMOGENIC FOCUS FOR ATRIAL FIBRILLATION N/A 6/24/2024    Procedure: Ablation atrial fibrillation;  Surgeon: Horacio Huerta MD;  Location: [facility] EP LAB;  Service: Cardiology;  Laterality: N/A;  afib, PVI, RFA, BERNARD (cx if SR), ZIA, anes, MB, 3 Prep, 3 hours per Dr. Huerta    ANGIOGRAM, EXTREMITY, UNILATERAL Left 1/4/2024    Procedure: ANGIOGRAM, EXTREMITY, UNILATERAL- Femoral / SMS Stent, Poss General;  Surgeon: ALEX Alfred III, MD;  Location: [address] FLR;  Service: Vascular;  Laterality: Left;  mGy : 2698.78  Gy.cm : 229.88  Contrast : 62ml  Fluro time : 13.8min    CARDIAC CATHETERIZATION      CARDIAC SURGERY      CARPAL TUNNEL RELEASE Left     CATARACT EXTRACTION      CATARACT EXTRACTION W/ INTRAOCULAR LENS  IMPLANT, BILATERAL  2008    CATHETERIZATION OF BOTH LEFT AND RIGHT HEART N/A 11/2/2018    Procedure: CATHETERIZATION, HEART, BOTH LEFT AND RIGHT;  Surgeon: Frank Gallardo MD;  Location: [facility] CATH LAB;  Service: Cardiology;  Laterality: N/A;    COLONOSCOPY  8/2013    COLONOSCOPY N/A 5/30/2016    Procedure: COLONOSCOPY;  Surgeon: Anna Tomas MD;  Location: [facility] ENDO;  Service: Endoscopy;  Laterality: N/A;    COLONOSCOPY N/A 7/17/2019    Procedure: COLONOSCOPY;  Surgeon: David Carter III, MD;  Location: [facility] ENDO;  Service: Endoscopy;  Laterality: N/A;    COLONOSCOPY N/A 12/14/2023    Procedure: COLONOSCOPY;  Surgeon: Ama Barrera MD;  Location: [facility] ENDO;  Service: Endoscopy;  Laterality: N/A;    COMPUTED TOMOGRAPHY N/A 9/9/2019    Procedure: CT (COMPUTED TOMOGRAPHY);  Surgeon: [facility] Diagnostic Provider;  Location: [facility] OR;  Service: General;  Laterality: N/A;  Microwave ablation to be done in CT.  Need  CRNA and cart    COMPUTED TOMOGRAPHY N/A 1/25/2022    Procedure: Liver Microwave Ablation;  Surgeon: Red Puentes MD;  Location: [facility];  Service: General;  Laterality: N/A;    CORONARY ARTERY BYPASS GRAFT (CABG) N/A 11/5/2018    Procedure: CORONARY ARTERY BYPASS GRAFT (CABG);  Surgeon: Bear De Luna MD;  Location: [facility];  Service: Cardiothoracic;  Laterality: N/A;  TWO VESSEL BYPASS WITH AORTOTOMY AND EXCISION OF ATHEROSCLEROTIC PLAQUE    CORONARY BYPASS GRAFT ANGIOGRAPHY  3/2/2020    Procedure: Bypass graft study;  Surgeon: Cora Cormier MD;  Location: [facility] CATH LAB;  Service: Cardiology;;    ECHOCARDIOGRAM,TRANSESOPHAGEAL N/A 6/24/2024    Procedure: Transesophageal echo (BERNARD) intra-procedure log documentation;  Surgeon: Nacho Downs MD;  Location: [facility] EP LAB;  Service: Cardiology;  Laterality: N/A;    ELBOW BURSA SURGERY Right 2010    ENDOSCOPIC HARVEST OF VEIN Left 11/5/2018    Procedure: SURGICAL PROCUREMENT, VEIN, ENDOSCOPIC;  Surgeon: Bear De Luna MD;  Location: [facility];  Service: Cardiothoracic;  Laterality: Left;    ESOPHAGOGASTRODUODENOSCOPY N/A 7/17/2019    Procedure: ESOPHAGOGASTRODUODENOSCOPY (EGD);  Surgeon: David Carter III, MD;  Location: [facility];  Service: Endoscopy;  Laterality: N/A;    ESOPHAGOGASTRODUODENOSCOPY N/A 12/29/2022    Procedure: ESOPHAGOGASTRODUODENOSCOPY (EGD);  Surgeon: Issa Gayle MD;  Location: [facility];  Service: Endoscopy;  Laterality: N/A;    ESOPHAGOGASTRODUODENOSCOPY N/A 1/17/2024    Procedure: EGD (ESOPHAGOGASTRODUODENOSCOPY);  Surgeon: Issa Gayle MD;  Location: [facility];  Service: Endoscopy;  Laterality: N/A;    ESOPHAGOGASTRODUODENOSCOPY N/A 4/30/2024    Procedure: EGD (ESOPHAGOGASTRODUODENOSCOPY);  Surgeon: Eder Foley MD;  Location: [facility];  Service: Gastroenterology;  Laterality: N/A;    ETHMOIDECTOMY Bilateral 3/27/2019    Procedure: ETHMOIDECTOMY;  Surgeon: Alejandro Parkinson MD;  Location: [facility];  Service:  ENT;  Laterality: Bilateral;    EYE SURGERY      FOOT SURGERY      FRONTAL SINUS OBLITERATION Bilateral 3/27/2019    Procedure: SINUSOTOMY, FRONTAL SINUS, OBLITERATIVE;  Surgeon: Alejandro Parkinson MD;  Location: [facility] OR;  Service: ENT;  Laterality: Bilateral;    FUNCTIONAL ENDOSCOPIC SINUS SURGERY (FESS) Bilateral 3/27/2019    Procedure: FESS (FUNCTIONAL ENDOSCOPIC SINUS SURGERY);  Surgeon: Alejandro Parkinson MD;  Location: [facility] OR;  Service: ENT;  Laterality: Bilateral;  Left Keila bullosa resection     INTRALUMINAL GASTROINTESTINAL TRACT IMAGING VIA CAPSULE N/A 2/2/2024    Procedure: IMAGING PROCEDURE, GI TRACT, INTRALUMINAL, VIA CAPSULE;  Surgeon: Cooper Estrella RN;  Location: [facility] ENDO;  Service: Endoscopy;  Laterality: N/A;    KNEE ARTHROSCOPY W/ MENISCAL REPAIR Left 06/2019    dr boykin    KNEE SURGERY Right     LEFT HEART CATHETERIZATION Left 3/2/2020    Procedure: CATHETERIZATION, HEART, LEFT;  Surgeon: Cora Cormier MD;  Location: [facility] CATH LAB;  Service: Cardiology;  Laterality: Left;    LIVER BIOPSY  01/2022    MAXILLARY ANTROSTOMY Bilateral 3/27/2019    Procedure: MAXILLARY ANTROSTOMY;  Surgeon: Alejandro Parkinson MD;  Location: [facility] OR;  Service: ENT;  Laterality: Bilateral;    NASAL SEPTOPLASTY N/A 3/27/2019    Procedure: SEPTOPLASTY, NOSE;  Surgeon: Alejandro Parkinson MD;  Location: [facility] OR;  Service: ENT;  Laterality: N/A;    RECTAL SURGERY  2012    STENT, SUPERIOR MESENTERIC ARTERY Left 1/4/2024    Procedure: STENT, SUPERIOR MESENTERIC ARTERY;  Surgeon: ALEX Alfred III, MD;  Location: [address];  Service: Vascular;  Laterality: Left;    TRANSURETHRAL RESECTION OF PROSTATE (TURP) WITHOUT USE OF LASER N/A 6/19/2018    Procedure: TURP, WITHOUT USING LASER;  Surgeon: Cooper Cordova IV, MD;  Location: [facility] OR;  Service: Urology;  Laterality: N/A;    TRIGGER FINGER RELEASE Left        Review of patient's allergies indicates:   Allergen Reactions    Lipitor [atorvastatin] Other (See Comments)     Muscle aches and pains  as well as fatigue.     Niacin preparations Other (See Comments)     Causes broken blood vessels and bruises at 4 times normal dose.    Statins-hmg-coa reductase inhibitors Other (See Comments)     Statins cause intolerable myalgias.    Iodinated contrast media Hives     Tachycardia    Omeprazole Hives and Itching    Prilosec [omeprazole magnesium] Hives and Itching       Current Facility-Administered Medications on File Prior to Encounter   Medication    lactated ringers infusion     Current Outpatient Medications on File Prior to Encounter   Medication Sig    albuterol (PROVENTIL HFA) 90 mcg/actuation inhaler Inhale 2 puffs into the lungs every 6 (six) hours as needed for Wheezing. Rescue    aspirin (ECOTRIN) 81 MG EC tablet Take 1 tablet (81 mg total) by mouth once daily.    budesonide-formoterol 160-4.5 mcg (SYMBICORT) 160-4.5 mcg/actuation HFAA INHALE 2 PUFFS INTO THE LUNGS TWO TIMES A DAY. (Patient taking differently: Inhale 2 puffs into the lungs every 12 (twelve) hours. INHALE 2 PUFFS INTO THE LUNGS TWO TIMES A DAY.)    carvediloL (COREG) 12.5 MG tablet Take 2 tablets (25 mg total) by mouth 2 (two) times daily with meals.    cimetidine (TAGAMET) 300 MG tablet Take 1 tablet (300 mg total) by mouth 3 (three) times daily. Take 50mg 13 hours prior, 7 hours prior, and 1 hour prior to test.    famotidine (PEPCID) 40 MG tablet Take 40 mg by mouth once daily.    finasteride (PROSCAR) 5 mg tablet TAKE 1 TABLET BY MOUTH once daily    furosemide (LASIX) 40 MG tablet Take 1 tablet (40 mg total) by mouth 2 (two) times daily.    GLUCOSAMINE HCL/CHONDR ROSARIO A NA (OSTEO BI-FLEX ORAL) Take 1 tablet by mouth 2 (two) times daily.     JARDIANCE 25 mg tablet TAKE 1 TABLET BY MOUTH EVERY DAY    krill oil 500 mg Cap Take 1 capsule by mouth Daily.    LANTUS SOLOSTAR U-100 INSULIN 100 unit/mL (3 mL) InPn pen INJECT 44 UNITS UNDER THE SKIN EVERY EVENING    levothyroxine (SYNTHROID) 300 MCG Tab Take 1 tablet (300 mcg total) by mouth  "every morning.    lisinopriL 10 MG tablet Take 1 tablet by mouth once daily    metFORMIN (GLUCOPHAGE-XR) 500 MG ER 24hr tablet Take 1 tablet (500 mg total) by mouth 2 (two) times daily with meals.    mirabegron (MYRBETRIQ) 25 mg Tb24 ER tablet Take 1 tablet (25 mg total) by mouth once daily.    montelukast (SINGULAIR) 10 mg tablet Take 1 tablet (10 mg total) by mouth once daily.    propafenone (RHTHYMOL) 150 MG Tab Take 1 tablet (150 mg total) by mouth every 8 (eight) hours.    RABEprazole (ACIPHEX) 20 mg tablet Take 2 tablets (40 mg total) by mouth once daily.    ranolazine (RANEXA) 1,000 mg Tb12 Take 1 tablet (1,000 mg total) by mouth 2 (two) times daily.    REPATHA SURECLICK 140 mg/mL PnIj INJECT 140mg subcutaneously every 14 days (Patient taking differently: Inject 140 mg into the skin every 14 (fourteen) days.)    rivaroxaban (XARELTO) 20 mg Tab Take 20 mg by mouth daily with dinner or evening meal.    ACCU-CHEK GRACE PLUS METER Misc AS DIRECTED    blood sugar diagnostic (ACCU-CHEK GRACE PLUS TEST STRP) Strp TEST THREE TIMES A DAY    inclisiran (LEQVIO) 284 mg/1.5 mL Syrg subcutaneous injection Inject 1.5 mLs (284 mg total) into the skin every 3 (three) months.    lancets (ACCU-CHEK FASTCLIX LANCET DRUM) Misc TEST TWO TIMES A DAY    pen needle, diabetic (BD ULTRA-FINE MINI PEN NEEDLE) 31 gauge x 3/16" Ndle USE AS DIRECTED    sildenafiL (VIAGRA) 100 MG tablet Take 1/2 to 1 tablet by mouth one hour prior to intercourse. Max 100mg per day     Family History       Problem Relation (Age of Onset)    Heart disease Mother, Father, Brother          Tobacco Use    Smoking status: Former     Current packs/day: 0.00     Average packs/day: 0.5 packs/day for 4.0 years (2.0 ttl pk-yrs)     Types: Cigarettes     Start date: 1968     Quit date: 1972     Years since quittin.1    Smokeless tobacco: Former     Types: Chew     Quit date: 1976   Substance and Sexual Activity    Alcohol use: Not Currently    " Drug use: No    Sexual activity: Yes     Partners: Female     Review of Systems   Constitutional: Negative.   HENT: Negative.     Eyes: Negative.    Cardiovascular:  Positive for dyspnea on exertion.   Respiratory:  Positive for shortness of breath.    Skin: Negative.    Musculoskeletal: Negative.    Gastrointestinal: Negative.    Genitourinary: Negative.    Neurological: Negative.    Psychiatric/Behavioral: Negative.       Objective:     Vital Signs (Most Recent):  Temp: 97.9 °F (36.6 °C) (07/16/24 1143)  Pulse: 69 (07/16/24 1143)  Resp: 18 (07/16/24 1143)  BP: 134/67 (07/16/24 1143)  SpO2: 99 % (07/16/24 1143) Vital Signs (24h Range):  Temp:  [97.9 °F (36.6 °C)-98 °F (36.7 °C)] 97.9 °F (36.6 °C)  Pulse:  [] 69  Resp:  [14-29] 18  SpO2:  [97 %-100 %] 99 %  BP: (129-162)/(56-77) 134/67     Weight: 97 kg (213 lb 13.5 oz)  Body mass index is 33.49 kg/m².    SpO2: 99 %         Intake/Output Summary (Last 24 hours) at 7/16/2024 1242  Last data filed at 7/16/2024 0506  Gross per 24 hour   Intake 10 ml   Output 1100 ml   Net -1090 ml       Lines/Drains/Airways       None                    Physical Exam  Vitals and nursing note reviewed.   Constitutional:       Appearance: Normal appearance.   HENT:      Head: Normocephalic and atraumatic.   Eyes:      General:         Right eye: No discharge.         Left eye: No discharge.      Pupils: Pupils are equal, round, and reactive to light.   Cardiovascular:      Rate and Rhythm: Normal rate and regular rhythm.      Heart sounds: S1 normal and S2 normal. No murmur heard.     No friction rub.   Pulmonary:      Effort: Pulmonary effort is normal. No respiratory distress.      Breath sounds: Normal breath sounds. No rales.   Abdominal:      Palpations: Abdomen is soft.      Tenderness: There is no abdominal tenderness.   Musculoskeletal:      Cervical back: Neck supple.      Right lower leg: No edema.      Left lower leg: No edema.   Skin:     General: Skin is warm and dry.  "  Neurological:      General: No focal deficit present.      Mental Status: He is alert and oriented to person, place, and time.   Psychiatric:         Mood and Affect: Mood normal.         Behavior: Behavior normal.         Thought Content: Thought content normal.          Significant Labs: BMP:   Recent Labs   Lab 07/15/24  0803 07/15/24  1253 07/16/24  0605   *  138* 88 93     138 140 142   K 4.4  4.4 4.3 3.9     105 105 103   CO2 25 25 24 29   BUN 16  16 16 13   CREATININE 1.3  1.3 1.2 1.2   CALCIUM 9.0  9.0 9.4 9.2   MG  --   --  2.1   , CMP   Recent Labs   Lab 07/15/24  0803 07/15/24  1253 07/16/24  0605     138 140 142   K 4.4  4.4 4.3 3.9     105 105 103   CO2 25 25 24 29   *  138* 88 93   BUN 16  16 16 13   CREATININE 1.3  1.3 1.2 1.2   CALCIUM 9.0  9.0 9.4 9.2   PROT 7.3 7.1 6.9   ALBUMIN 3.7 3.7 3.5   BILITOT 0.4 0.4 0.4   ALKPHOS 65 64 60   AST 20 19 17   ALT 13 14 11   ANIONGAP 8  8 11 10   , CBC   Recent Labs   Lab 07/15/24  0803 07/15/24  1206 07/16/24  0605   WBC 4.53  4.53 4.65 4.62   HGB 9.2*  9.2* 9.0* 9.0*   HCT 29.9*  29.9* 28.3* 29.4*     229 227 207   , INR   Recent Labs   Lab 07/15/24  0803   INR 1.1   , Lipid Panel No results for input(s): "CHOL", "HDL", "LDLCALC", "TRIG", "CHOLHDL" in the last 48 hours., Troponin   Recent Labs   Lab 07/15/24  1917 07/15/24  2300 07/16/24  0605   TROPONINI 0.012 0.010 0.013   , and All pertinent lab results from the last 24 hours have been reviewed.    Significant Imaging: Cardiac Cath: reviewed, Echocardiogram: Transthoracic echo (TTE) complete (Cupid Only):   Results for orders placed or performed during the hospital encounter of 06/11/24   Echo   Result Value Ref Range    BSA 2.19 m2    LVOT stroke volume 81.02 cm3    LVIDd 4.37 3.5 - 6.0 cm    LV Systolic Volume 38.77 mL    LV Systolic Volume Index 18.3 mL/m2    LVIDs 3.13 2.1 - 4.0 cm    LV Diastolic Volume 86.07 mL    LV Diastolic " Volume Index 40.60 mL/m2    IVS 1.45 (A) 0.6 - 1.1 cm    LVOT diameter 2.04 cm    LVOT area 3.3 cm2    FS 28 28 - 44 %    Left Ventricle Relative Wall Thickness 0.65 cm    Posterior Wall 1.42 (A) 0.6 - 1.1 cm    LV mass 246.91 g    LV Mass Index 116 g/m2    MV Peak E Delbert 1.10 m/s    TDI LATERAL 0.11 m/s    TDI SEPTAL 0.07 m/s    E/E' ratio 12.22 m/s    MV Peak A Delbert 1.34 m/s    TR Max Delbert 2.76 m/s    E/A ratio 0.82     IVRT 74.22 msec    E wave deceleration time 246.09 msec    LV SEPTAL E/E' RATIO 15.71 m/s    LV LATERAL E/E' RATIO 10.00 m/s    LVOT peak delbert 0.97 m/s    Left Ventricular Outflow Tract Mean Velocity 0.64 cm/s    Left Ventricular Outflow Tract Mean Gradient 1.94 mmHg    RVOT peak VTI 13.7 cm    TAPSE 1.38 cm    LA size 4.08 cm    Left Atrium Minor Axis 5.61 cm    Left Atrium Major Axis 6.20 cm    RA Major Axis 5.45 cm    AV regurgitation pressure 1/2 time [id number] ms    AR Max Delbert 3.80 m/s    AV mean gradient 20 mmHg    AV peak gradient 32 mmHg    Ao peak delbert 2.84 m/s    Ao VTI 68.90 cm    LVOT peak VTI 24.80 cm    AV valve area 1.18 cm²    AV Velocity Ratio 0.34     AV index (prosthetic) 0.36     RADHA by Velocity Ratio 1.12 cm²    Triscuspid Valve Regurgitation Peak Gradient 30 mmHg    PV mean gradient 1 mmHg    RVOT peak delbert 0.68 m/s    Ao root annulus 3.52 cm    STJ 3.68 cm    Ascending aorta 3.60 cm    IVC diameter 1.48 cm    Mean e' 0.09 m/s    ZLVIDS -2.11     ZLVIDD -4.27     LA Volume Index 43.4 mL/m2    LA volume 91.92 cm3    LA WIDTH 4.5 cm    RA Width 4.1 cm    TV resting pulmonary artery pressure 33 mmHg    RV TB RVSP 6 mmHg    Est. RA pres 3 mmHg    Narrative      Left Ventricle: The left ventricle is normal in size. Increased wall   thickness. There is moderate asymmetric hypertrophy. Septal motion is   consistent with post-operative status. There is normal systolic function   with a visually estimated ejection fraction of 55 - 60%. Grade II   diastolic dysfunction.    Right  Ventricle: Mild right ventricular enlargement. Wall thickness is   normal. Systolic function is mildly reduced.    Left Atrium: Left atrium is moderately dilated.    Aortic Valve: There is severe aortic valve sclerosis. Moderately   restricted motion. There is moderate stenosis. Aortic valve area by VTI is   1.18 cm². Aortic valve peak velocity is 2.84 m/s. Mean gradient is 20   mmHg. The dimensionless index is 0.36. There is mild aortic regurgitation.    Mitral Valve: There is mild regurgitation.    Tricuspid Valve: There is moderate regurgitation.    Pulmonary Artery: The estimated pulmonary artery systolic pressure is   33 mmHg.    IVC/SVC: Normal venous pressure at 3 mmHg.     , EKG: reviewed, Stress Test: reviewed, and X-Ray: CXR: X-Ray Chest 1 View (CXR):   Results for orders placed or performed during the hospital encounter of 07/15/24   X-Ray Chest 1 View    Narrative    EXAMINATION:  XR CHEST 1 VIEW    CLINICAL HISTORY:  shortness of breath;    TECHNIQUE:  Single frontal view of the chest was performed.    COMPARISON:  None    FINDINGS:  Median sternotomy wires in place.    The lungs are clear, with normal appearance of pulmonary vasculature and no pleural effusion or pneumothorax.    The cardiac silhouette is normal in size. The hilar and mediastinal contours are unremarkable.    Bones are intact.      Impression    No acute abnormality.      Electronically signed by: Darrell Iglesias  Date:    07/15/2024  Time:    12:25     Assessment and Plan:     * Coronary artery disease with exertional angina  Nuc stress neg for ischemia June 24'  Echo with nml EF, mod AS  Cont ASA, Coreg, Leqvio    MARCUS (dyspnea on exertion)    Cont home lasix regimen  Iron infusion    Paroxysmal atrial fibrillation  Coreg and xarelto  Rate controlled  Planned for Watchman procedure with Dr. Huerta    Type 2 diabetes mellitus with microalbuminuria, with long-term current use of insulin  Management per primary team    Iron  deficiency anemia due to chronic blood loss  Management per primary team    Mixed hyperlipidemia  leqvio    DELONTE on CPAP  CPAP    PVD (peripheral vascular disease)  Cont ASA and Leqvio    Essential hypertension  Titrate medications  Cont home regimen        VTE Risk Mitigation (From admission, onward)           Ordered     rivaroxaban tablet 20 mg  With dinner         07/15/24 2046     Reason for No Pharmacological VTE Prophylaxis  Once        Question:  Reasons:  Answer:  Already adequately anticoagulated on oral Anticoagulants    07/15/24 2013     IP VTE HIGH RISK PATIENT  Once         07/15/24 2013     Place sequential compression device  Until discontinued         07/15/24 2013                    Thank you for your consult. I will follow-up with patient. Please contact us if you have any additional questions.    Sandi Arias, NP  Cardiology   [facility]

## 2024-07-16 NOTE — DISCHARGE SUMMARY
[facility]  [facility]  Discharge Summary      Patient Name: Erendira Pretty  MRN: [id number]  LLOYD: [id number]  Patient Class: OP- Observation  Admission Date: 7/15/2024  Hospital Length of Stay: 0 days  Discharge Date and Time:  07/16/2024 12:13 PM  Attending Physician: Valarie Nichole MD   Discharging Provider: Danelle Smith NP  Primary Care Provider: Bhupinder Callaway MD    Primary Care Team: Networked reference to record PCT     HPI:     Patient is a 79-year-old male with past medical history significant for asthma, CAD status post CABG 2018, COPD, cirrhosis, hepatitis-C (2015, treated with Havoni),  hypertension, hypothyroidism, obstructive sleep apnea on CPAP, osteoarthritis, esophageal varices, type 2 IDDM,  aortic stenosis, BPH with nocturia, bilateral carotid artery stenosis, claudication, hyperlipidemia, glaucoma, peripheral vascular disease, and paroxysmal atrial fibrillation status post ablation procedure on Xarelto  who presented to ED with complaint of shortness of breath for the past 3 days, occurring only with exertion.  He states that he used to walk about a mile a day, however for the past week he has been unable to walk without getting short of breath, also complaining of some chest pain/tightness and also of bilateral severe leg pain with walking.  His symptoms completely subside with rest.  He denies fever, chills, lightheadedness, dizziness, headache, abdominal pain, nausea, vomiting, diarrhea, dysuria, edema.  He states that the chest tightness is mild and goes away with rest.  Vital signs on arrival to ED temp 98.3°, heart rate 78, respirations 20, blood pressure 119/60, 99% SpO2 on room air.  While in ED blood pressure has risen to 150s to 160s.   Lab workup shows RBC 3.28, hemoglobin 9.0, hematocrit 28.3, platelets 227, iron level 26, TIBC 445, saturating iron 6, transferrin 301, ferritin 17, folate 12.1, vitamin B12 454, PT 12.6, INR 1.1, PTT 34.2, chemistry is  normal, , troponin 0.007 trended up to 0.012.   EKG sinus rhythm with first-degree AV block, heart rate 74, premature supraventricular contractions noted, .  Chest x-ray demonstrates that lungs are clear. He was not given any medications while in ED.  Hospital Medicine team was consulted for admission due to chest pain and dyspnea on exertion.    * No surgery found *      Hospital Course:   79 yr old male presented to the ER with complaints of shortness of breath for the past 3 days, occurring only with exertion.   Lab workup shows RBC 3.28, hemoglobin 9.0, hematocrit 28.3, platelets 227, iron level 26, TIBC 445, saturating iron 6, transferrin 301, ferritin 17, folate 12.1, vitamin B12 454, PT 12.6, INR 1.1, PTT 34.2, chemistry is normal, , troponin 0.007 trended up to 0.012. EKG sinus rhythm with first-degree AV block, heart rate 74, premature supraventricular contractions noted, .   Chest x-ray demonstrates that lungs are clear     Cardiology consulted, no new recommendations at this time pt to follow up with Dr. Gallardo in clinic tomorrow 7/17/24.       Patient reports overall he is feeling much better, SOB has resolved and patient with able to ambulate down the hallway without experiencing SOB. Patient seen by cardiology and stable for discharge. Patient to follow up with cardiology Dr. Gallardo tomorrow in clinic. Patient discharged to continue home medications. Patient educated on importance of monitoring BP at home in a log and bringing log to follow up with PCP/cardiology for possible medication adjustments.     Patient seen and examined on the day of discharge.  All questions and concerns were addressed prior to discharge.    Face to face encounter with patient: 44 min          Goals of Care Treatment Preferences:  Code Status: Full Code    Living Will: Yes              Consults:   Consults (From admission, onward)          Status Ordering Provider     Inpatient consult to  Cardiology  Once        Provider:  Thomas Israel MD    Acknowledged HAL OTTO     Inpatient consult to Hospitalist  Once        Provider:  Margaret Garcia MD    Acknowledged GENARO JONES            Pulmonary  Mild intermittent asthma without complication  PRN nebs ordered  Patient reports that he does not use an inhaler regularly      Cardiac/Vascular  * Coronary artery disease with exertional angina  Patient with known CAD s/p stent placement and CABG, which is uncontrolled Will continue  Xarelto and ASA and monitor for S/Sx of angina/ACS. Continue to monitor on telemetry.  Statin intolerance noted in records    Patient complains of chest tightness and shortness of breath with walking, also with severe leg pain with walking  All symptoms resolve with rest  Had recent ablation procedure  Cardiac monitoring ordered  Trending troponin levels-1st 2 are normal  No significant EKG changes noted  Consult cardiology, he is followed by Dr. Gallardo  Patient to follow up with Dr. Gallardo tomorrow 7/17/24 at 8:40 a.m.     MARCUS (dyspnea on exertion)  Likely multifactorial given significant past medical history  Dyspnea occurs with walking, resolves with rest, no oxygen requirement  Does not appear fluid overloaded, CXR is clear  Continue home inhalational therapies, PRN nebs ordered  Resolved and stable for discharge       Paroxysmal atrial fibrillation  Atrial fibrillation which is controlled currently with Beta Blocker and recent ablation procedure . Patient is currently in sinus rhythm.[id number] Score: 4. Anticoagulation indicated. Anticoagulation done with Xarelto .    Mixed hyperlipidemia  Intolerant of statins      PVD (peripheral vascular disease)  Continue ASA and Xarelto  Reports significant leg pain with walking and/or standing too long      Essential hypertension  Chronic, controlled. Latest blood pressure and vitals reviewed-     Temp:  [97.9 °F (36.6 °C)-98 °F (36.7 °C)]   Pulse:  []   Resp:   [14-29]   BP: (129-162)/(56-77)   SpO2:  [97 %-100 %] .   Home meds for hypertension were reviewed and noted below.   Hypertension Medications               carvediloL (COREG) 12.5 MG tablet Take 2 tablets (25 mg total) by mouth 2 (two) times daily with meals.    furosemide (LASIX) 40 MG tablet Take 1 tablet (40 mg total) by mouth 2 (two) times daily.    lisinopriL 10 MG tablet Take 1 tablet by mouth once daily            While in the hospital, will manage blood pressure as follows; Continue home antihypertensive regimen    Will utilize p.r.n. blood pressure medication only if patient's blood pressure greater than 180/110 and he develops symptoms such as worsening chest pain or shortness of breath.    Renal/  Benign prostatic hyperplasia with nocturia  Continue home medication or hospital equivalent      Oncology  Iron deficiency anemia due to chronic blood loss  Patient's anemia is currently worsening. Has not received any PRBCs to date. Etiology likely d/t chronic blood loss, Iron deficiency, and chronic disease due to Chronic Kidney Disease  Current CBC reviewed-   Lab Results   Component Value Date    HGB 9.0 (L) 07/16/2024    HCT 29.4 (L) 07/16/2024     Monitor serial CBC and transfuse if patient becomes hemodynamically unstable, symptomatic or H/H drops below 7/21.  Iron level and ferritin levels are low  We will give IV iron supplementation while in hospital  -patient to follow up with PCP as soon as possible after discharge     Endocrine  Type 2 diabetes mellitus with microalbuminuria, with long-term current use of insulin  Serial glucose checks ordered  Order PRN sliding scale --moderate  Diabetic diet ordered  Holding metformin and Lantus for now if becomes hyperglycemic will restart Lantus      Acquired hypothyroidism  Continue Synthroid 300 mcg daily  TSH done about 1 month ago and was 1.151      GI  GERD (gastroesophageal reflux disease)  Continue PPI      Orthopedic  Pain in both lower  extremities  Likely due to severe peripheral vascular disease  Pain is immediately relieved with rest  Supportive care      Other  DELONTE on CPAP  Nightly CPAP ordered        Final Active Diagnoses:    Diagnosis Date Noted POA    PRINCIPAL PROBLEM:  Coronary artery disease with exertional angina [I25.118] 07/15/2024 Yes    MARCUS (dyspnea on exertion) [R06.09] 07/15/2024 Yes    Type 2 diabetes mellitus with microalbuminuria, with long-term current use of insulin [E11.29, R80.9, Z79.4] 07/21/2021 Not Applicable     Chronic    Paroxysmal atrial fibrillation [I48.0] 07/21/2021 Yes    GERD (gastroesophageal reflux disease) [K21.9] 07/17/2019 Yes    Iron deficiency anemia due to chronic blood loss [D50.0] 05/07/2019 Yes    Pain in both lower extremities [M79.604, M79.605] 03/21/2019 Yes    Mixed hyperlipidemia [E78.2] 11/03/2018 Yes    Mild intermittent asthma without complication [J45.20]  Yes    DELONTE on CPAP [G47.33] 07/07/2015 Yes     Chronic    Benign prostatic hyperplasia with nocturia [N40.1, R35.1]  Yes     Chronic    PVD (peripheral vascular disease) [I73.9]  Yes    Acquired hypothyroidism [E03.9]  Yes     Chronic    Essential hypertension [I10] 07/08/2013 Yes     Chronic      Problems Resolved During this Admission:       Discharged Condition: good    Disposition: Home or Self Care    Follow Up:   Follow-up Information       Bhupinder Callaway MD. Schedule an appointment as soon as possible for a visit.    Specialties: Internal Medicine, Pediatrics  Contact information:  [address]  [place] [address]  [phone]               Frank Gallardo MD. Go to.    Specialties: Interventional Cardiology, Cardiology  Why: Appointment on 7/17/24 at 8:40 a.m.  Contact information:  [address] [facility]  [place] [address]  [phone]                           Patient Instructions:      Diet Cardiac     Activity as tolerated       Significant Diagnostic Studies: Labs: BMP:   Recent Labs   Lab  07/15/24  0803 07/15/24  1253 07/16/24  0605   *  138* 88 93     138 140 142   K 4.4  4.4 4.3 3.9     105 105 103   CO2 25 25 24 29   BUN 16  16 16 13   CREATININE 1.3  1.3 1.2 1.2   CALCIUM 9.0  9.0 9.4 9.2   MG  --   --  2.1   , CMP   Recent Labs   Lab 07/15/24  0803 07/15/24  1253 07/16/24  0605     138 140 142   K 4.4  4.4 4.3 3.9     105 105 103   CO2 25 25 24 29   *  138* 88 93   BUN 16  16 16 13   CREATININE 1.3  1.3 1.2 1.2   CALCIUM 9.0  9.0 9.4 9.2   PROT 7.3 7.1 6.9   ALBUMIN 3.7 3.7 3.5   BILITOT 0.4 0.4 0.4   ALKPHOS 65 64 60   AST 20 19 17   ALT 13 14 11   ANIONGAP 8  8 11 10   , CBC   Recent Labs   Lab 07/15/24  0803 07/15/24  1206 07/16/24  0605   WBC 4.53  4.53 4.65 4.62   HGB 9.2*  9.2* 9.0* 9.0*   HCT 29.9*  29.9* 28.3* 29.4*     229 227 207   , INR   Lab Results   Component Value Date    INR 1.1 07/15/2024    INR 1.1 06/17/2024    INR 1.0 06/06/2024   , Lipid Panel   Lab Results   Component Value Date    CHOL 154 04/09/2024    HDL 51 04/09/2024    LDLCALC 86.2 04/09/2024    TRIG 84 04/09/2024    CHOLHDL 33.1 04/09/2024   , Troponin   Recent Labs   Lab 07/15/24  1917 07/15/24  2300 07/16/24  0605   TROPONINI 0.012 0.010 0.013   , A1C:   Recent Labs   Lab 04/09/24  0709   HGBA1C 5.3   , and All labs within the past 24 hours have been reviewed  Cardiac Graphics: ECG: inus rhythm with first-degree AV block, heart rate 74, premature supraventricular contractions noted, .     Pending Diagnostic Studies:       None           Medications:  Reconciled Home Medications:      Medication List        CHANGE how you take these medications      budesonide-formoterol 160-4.5 mcg 160-4.5 mcg/actuation Hfaa  Commonly known as: SYMBICORT  INHALE 2 PUFFS INTO THE LUNGS TWO TIMES A DAY.  What changed:   how much to take  how to take this  when to take this     finasteride 5 mg tablet  Commonly known as: PROSCAR  TAKE 1 TABLET BY MOUTH once  daily  What changed: when to take this     JARDIANCE 25 mg tablet  Generic drug: empagliflozin  TAKE 1 TABLET BY MOUTH EVERY DAY  What changed: when to take this     REPATHA SURECLICK 140 mg/mL Pnij  Generic drug: evolocumab  INJECT 140mg subcutaneously every 14 days  What changed: See the new instructions.            CONTINUE taking these medications      ACCU-CHEK GRACE PLUS METER Misc  Generic drug: blood-glucose meter  AS DIRECTED     ACCU-CHEK FASTCLIX LANCET DRUM Misc  Generic drug: lancets  TEST TWO TIMES A DAY     aspirin 81 MG EC tablet  Commonly known as: ECOTRIN  Take 1 tablet (81 mg total) by mouth once daily.     blood sugar diagnostic Strp  Commonly known as: ACCU-CHEK GRACE PLUS TEST STRP  TEST THREE TIMES A DAY     carvediloL 12.5 MG tablet  Commonly known as: COREG  Take 2 tablets (25 mg total) by mouth 2 (two) times daily with meals.     cimetidine 300 MG tablet  Commonly known as: TAGAMET  Take 1 tablet (300 mg total) by mouth 3 (three) times daily. Take 50mg 13 hours prior, 7 hours prior, and 1 hour prior to test.     famotidine 40 MG tablet  Commonly known as: PEPCID  Take 40 mg by mouth once daily.     furosemide 40 MG tablet  Commonly known as: LASIX  Take 1 tablet (40 mg total) by mouth 2 (two) times daily.     inclisiran 284 mg/1.5 mL Syrg subcutaneous injection  Commonly known as: LEQVIO  Inject 1.5 mLs (284 mg total) into the skin every 3 (three) months.     krill oil 500 mg Cap  Take 1 capsule by mouth Daily.     LANTUS SOLOSTAR U-100 INSULIN 100 unit/mL (3 mL) Inpn pen  Generic drug: insulin glargine U-100 (Lantus)  INJECT 44 UNITS UNDER THE SKIN EVERY EVENING     levothyroxine 300 MCG Tab  Commonly known as: SYNTHROID  Take 1 tablet (300 mcg total) by mouth every morning.     lisinopriL 10 MG tablet  Take 1 tablet by mouth once daily     metFORMIN 500 MG ER 24hr tablet  Commonly known as: GLUCOPHAGE-XR  Take 1 tablet (500 mg total) by mouth 2 (two) times daily with meals.     mirabegron  "25 mg Tb24 ER tablet  Commonly known as: MYRBETRIQ  Take 1 tablet (25 mg total) by mouth once daily.     montelukast 10 mg tablet  Commonly known as: SINGULAIR  Take 1 tablet (10 mg total) by mouth once daily.     OSTEO BI-FLEX ORAL  Take 1 tablet by mouth 2 (two) times daily.     pen needle, diabetic 31 gauge x 3/16" Ndle  Commonly known as: BD ULTRA-FINE MINI PEN NEEDLE  USE AS DIRECTED     propafenone 150 MG Tab  Commonly known as: RHTHYMOL  Take 1 tablet (150 mg total) by mouth every 8 (eight) hours.     PROVENTIL HFA 90 mcg/actuation inhaler  Generic drug: albuterol  Inhale 2 puffs into the lungs every 6 (six) hours as needed for Wheezing. Rescue     RABEprazole 20 mg tablet  Commonly known as: ACIPHEX  Take 2 tablets (40 mg total) by mouth once daily.     ranolazine 1,000 mg Tb12  Commonly known as: RANEXA  Take 1 tablet (1,000 mg total) by mouth 2 (two) times daily.     sildenafiL 100 MG tablet  Commonly known as: VIAGRA  Take 1/2 to 1 tablet by mouth one hour prior to intercourse. Max 100mg per day     XARELTO 20 mg Tab  Generic drug: rivaroxaban  Take 20 mg by mouth daily with dinner or evening meal.              Indwelling Lines/Drains at time of discharge:   Lines/Drains/Airways       None                   Time spent on the discharge of patient: 55 minutes     The patient's case has been reviewed by my supervising physician.   Supervising physician will provide additional orders and recommendations at his/her discretion.  Please see supervising physicians documentation and/or orders for further details.       Danelle Smith NP  Department of Hospital Medicine  [facility]  "

## 2024-07-16 NOTE — ASSESSMENT & PLAN NOTE
Cont ASA and Leqvio   IV Chemotherapy Education    John Dobbs     Date: 1/23/19   Diagnosis:  Appendiceal ca   Caregivers present: Significant Other    Drug names:  5FU, Leucovorin, Oxaliplatin    Treatment Effects on Bone Marrow:  Chemotherapy action on cancer documented side effects and management, when to call provider and contact information.      Panda CHOUDHURY  Nurse Practitioner  [facility] Hematology Oncology [facility]

## 2024-07-16 NOTE — ASSESSMENT & PLAN NOTE
Atrial fibrillation which is controlled currently with Beta Blocker and recent ablation procedure . Patient is currently in sinus rhythm.[id number] Score: 4. Anticoagulation indicated. Anticoagulation done with Xarelto .

## 2024-07-16 NOTE — DISCHARGE INSTRUCTIONS
Follow up with with PCP as soon as possible   Attend appointment 7/17/24 with Dr. Gallardo at 8:40 a.m.   Monitor BP at home in a log and bringing log to follow up with PCP for possible medication adjustments.

## 2024-07-16 NOTE — PLAN OF CARE
Problem: Adult Inpatient Plan of Care  Goal: Plan of Care Review  Outcome: Progressing  Goal: Patient-Specific Goal (Individualized)  Outcome: Progressing  Goal: Absence of Hospital-Acquired Illness or Injury  Outcome: Progressing  Goal: Optimal Comfort and Wellbeing  Outcome: Progressing  Goal: Readiness for Transition of Care  Outcome: Progressing     Problem: Infection  Goal: Absence of Infection Signs and Symptoms  Outcome: Progressing     Problem: Diabetes Comorbidity  Goal: Blood Glucose Level Within Targeted Range  Outcome: Progressing

## 2024-07-16 NOTE — ASSESSMENT & PLAN NOTE
Likely multifactorial given significant past medical history  Dyspnea occurs with walking, resolves with rest, no oxygen requirement  Does not appear fluid overloaded, CXR is clear  Continue home inhalational therapies, PRN nebs ordered  Resolved and stable for discharge

## 2024-07-16 NOTE — SUBJECTIVE & OBJECTIVE
Past Medical History:   Diagnosis Date    Anticoagulant long-term use     Aortic stenosis     Asthma     Benign prostatic hyperplasia with nocturia     Bilateral carotid artery stenosis 07/21/2021    US CAROTID BILATERAL 07/14/2021 IMPRESSION: Atherosclerosis with suspected stenosis greater than 50% at the right proximal ICA visually. Velocities are discordant and appear improved from prior. Atherosclerosis on the left with no evidence of flow-limiting stenosis greater than 50%.  FINDINGS: Right: Internal Carotid Artery (ICA): Peak systolic velocity 118 cm/sec. 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 09/22/2020    DELONTE on CPAP     Osteoarthritis     Paroxysmal atrial fibrillation     PVD (peripheral vascular disease)     Secondary esophageal varices without bleeding 06/26/2017    Type 2 diabetes mellitus        Past Surgical History:   Procedure Laterality Date    ABLATION OF ARRHYTHMOGENIC FOCUS FOR ATRIAL FIBRILLATION N/A 6/24/2024    Procedure: Ablation atrial fibrillation;  Surgeon: Horacio Huerta MD;  Location: [facility] EP LAB;  Service: Cardiology;  Laterality: N/A;  afib, PVI, RFA, BERNARD (cx if SR), ZIA, anes, MB, 3 Prep, 3 hours per Dr. Huerta    ANGIOGRAM, EXTREMITY, UNILATERAL Left 1/4/2024    Procedure: ANGIOGRAM, EXTREMITY, UNILATERAL- Femoral / SMS Stent, Poss General;  Surgeon: ALEX Alfred III, MD;  Location: [facility] OR [address] [place] [address];  Service: Vascular;  Laterality: Left;  mGy : 2698.78  Gy.cm : 229.88  Contrast :  62ml  Fluro time : 13.8min    CARDIAC CATHETERIZATION      CARDIAC SURGERY      CARPAL TUNNEL RELEASE Left     CATARACT EXTRACTION      CATARACT EXTRACTION W/ INTRAOCULAR LENS  IMPLANT, BILATERAL  2008    CATHETERIZATION OF BOTH LEFT AND RIGHT HEART N/A 11/2/2018    Procedure: CATHETERIZATION, HEART, BOTH LEFT AND RIGHT;  Surgeon: Frank Gallardo MD;  Location: [facility] CATH LAB;  Service: Cardiology;  Laterality: N/A;    COLONOSCOPY  8/2013    COLONOSCOPY N/A 5/30/2016    Procedure: COLONOSCOPY;  Surgeon: Anna Tomas MD;  Location: [facility] ENDO;  Service: Endoscopy;  Laterality: N/A;    COLONOSCOPY N/A 7/17/2019    Procedure: COLONOSCOPY;  Surgeon: David Carter III, MD;  Location: [facility] ENDO;  Service: Endoscopy;  Laterality: N/A;    COLONOSCOPY N/A 12/14/2023    Procedure: COLONOSCOPY;  Surgeon: Ama Barrera MD;  Location: [facility] ENDO;  Service: Endoscopy;  Laterality: N/A;    COMPUTED TOMOGRAPHY N/A 9/9/2019    Procedure: CT (COMPUTED TOMOGRAPHY);  Surgeon: [facility] Diagnostic Provider;  Location: [facility] OR;  Service: General;  Laterality: N/A;  Microwave ablation to be done in CT.  Need CRNA and cart    COMPUTED TOMOGRAPHY N/A 1/25/2022    Procedure: Liver Microwave Ablation;  Surgeon: Red Puentes MD;  Location: [facility];  Service: General;  Laterality: N/A;    CORONARY ARTERY BYPASS GRAFT (CABG) N/A 11/5/2018    Procedure: CORONARY ARTERY BYPASS GRAFT (CABG);  Surgeon: Bear De Lnua MD;  Location: [facility];  Service: Cardiothoracic;  Laterality: N/A;  TWO VESSEL BYPASS WITH AORTOTOMY AND EXCISION OF ATHEROSCLEROTIC PLAQUE    CORONARY BYPASS GRAFT ANGIOGRAPHY  3/2/2020    Procedure: Bypass graft study;  Surgeon: Cora Cormier MD;  Location: [facility] CATH LAB;  Service: Cardiology;;    ECHOCARDIOGRAM,TRANSESOPHAGEAL N/A 6/24/2024    Procedure: Transesophageal echo (BERNARD) intra-procedure log documentation;  Surgeon: Nacho Downs MD;  Location: [facility] EP LAB;  Service: Cardiology;  Laterality: N/A;     ELBOW BURSA SURGERY Right 2010    ENDOSCOPIC HARVEST OF VEIN Left 11/5/2018    Procedure: SURGICAL PROCUREMENT, VEIN, ENDOSCOPIC;  Surgeon: Bear De Luna MD;  Location: [facility];  Service: Cardiothoracic;  Laterality: Left;    ESOPHAGOGASTRODUODENOSCOPY N/A 7/17/2019    Procedure: ESOPHAGOGASTRODUODENOSCOPY (EGD);  Surgeon: David Carter III, MD;  Location: [facility];  Service: Endoscopy;  Laterality: N/A;    ESOPHAGOGASTRODUODENOSCOPY N/A 12/29/2022    Procedure: ESOPHAGOGASTRODUODENOSCOPY (EGD);  Surgeon: Issa Gayle MD;  Location: [facility];  Service: Endoscopy;  Laterality: N/A;    ESOPHAGOGASTRODUODENOSCOPY N/A 1/17/2024    Procedure: EGD (ESOPHAGOGASTRODUODENOSCOPY);  Surgeon: Issa Gayle MD;  Location: [facility];  Service: Endoscopy;  Laterality: N/A;    ESOPHAGOGASTRODUODENOSCOPY N/A 4/30/2024    Procedure: EGD (ESOPHAGOGASTRODUODENOSCOPY);  Surgeon: Eder Foley MD;  Location: [facility];  Service: Gastroenterology;  Laterality: N/A;    ETHMOIDECTOMY Bilateral 3/27/2019    Procedure: ETHMOIDECTOMY;  Surgeon: Alejandor aPrkinson MD;  Location: [facility];  Service: ENT;  Laterality: Bilateral;    EYE SURGERY      FOOT SURGERY      FRONTAL SINUS OBLITERATION Bilateral 3/27/2019    Procedure: SINUSOTOMY, FRONTAL SINUS, OBLITERATIVE;  Surgeon: Alejandro Parkinson MD;  Location: [facility] OR;  Service: ENT;  Laterality: Bilateral;    FUNCTIONAL ENDOSCOPIC SINUS SURGERY (FESS) Bilateral 3/27/2019    Procedure: FESS (FUNCTIONAL ENDOSCOPIC SINUS SURGERY);  Surgeon: Alejandro Parkinson MD;  Location: [facility] OR;  Service: ENT;  Laterality: Bilateral;  Left Keila bullosa resection     INTRALUMINAL GASTROINTESTINAL TRACT IMAGING VIA CAPSULE N/A 2/2/2024    Procedure: IMAGING PROCEDURE, GI TRACT, INTRALUMINAL, VIA CAPSULE;  Surgeon: Cooper Estrella RN;  Location: [facility];  Service: Endoscopy;  Laterality: N/A;    KNEE ARTHROSCOPY W/ MENISCAL REPAIR Left 06/2019    dr boykin    KNEE SURGERY Right     LEFT HEART  CATHETERIZATION Left 3/2/2020    Procedure: CATHETERIZATION, HEART, LEFT;  Surgeon: Cora Cormier MD;  Location: [facility] CATH LAB;  Service: Cardiology;  Laterality: Left;    LIVER BIOPSY  01/2022    MAXILLARY ANTROSTOMY Bilateral 3/27/2019    Procedure: MAXILLARY ANTROSTOMY;  Surgeon: Alejandro Parkinson MD;  Location: [facility] OR;  Service: ENT;  Laterality: Bilateral;    NASAL SEPTOPLASTY N/A 3/27/2019    Procedure: SEPTOPLASTY, NOSE;  Surgeon: Alejandro Parkinson MD;  Location: [facility] OR;  Service: ENT;  Laterality: N/A;    RECTAL SURGERY  2012    STENT, SUPERIOR MESENTERIC ARTERY Left 1/4/2024    Procedure: STENT, SUPERIOR MESENTERIC ARTERY;  Surgeon: ALEX Alfred III, MD;  Location: [address];  Service: Vascular;  Laterality: Left;    TRANSURETHRAL RESECTION OF PROSTATE (TURP) WITHOUT USE OF LASER N/A 6/19/2018    Procedure: TURP, WITHOUT USING LASER;  Surgeon: Cooper Cordova IV, MD;  Location: [facility] OR;  Service: Urology;  Laterality: N/A;    TRIGGER FINGER RELEASE Left        Review of patient's allergies indicates:   Allergen Reactions    Lipitor [atorvastatin] Other (See Comments)     Muscle aches and pains as well as fatigue.     Niacin preparations Other (See Comments)     Causes broken blood vessels and bruises at 4 times normal dose.    Statins-hmg-coa reductase inhibitors Other (See Comments)     Statins cause intolerable myalgias.    Iodinated contrast media Hives     Tachycardia    Omeprazole Hives and Itching    Prilosec [omeprazole magnesium] Hives and Itching       Current Facility-Administered Medications on File Prior to Encounter   Medication    lactated ringers infusion     Current Outpatient Medications on File Prior to Encounter   Medication Sig    albuterol (PROVENTIL HFA) 90 mcg/actuation inhaler Inhale 2 puffs into the lungs every 6 (six) hours as needed for Wheezing. Rescue    aspirin (ECOTRIN) 81 MG EC tablet Take 1 tablet (81 mg total) by mouth once daily.    budesonide-formoterol 160-4.5 mcg  (SYMBICORT) 160-4.5 mcg/actuation HFAA INHALE 2 PUFFS INTO THE LUNGS TWO TIMES A DAY. (Patient taking differently: Inhale 2 puffs into the lungs every 12 (twelve) hours. INHALE 2 PUFFS INTO THE LUNGS TWO TIMES A DAY.)    carvediloL (COREG) 12.5 MG tablet Take 2 tablets (25 mg total) by mouth 2 (two) times daily with meals.    cimetidine (TAGAMET) 300 MG tablet Take 1 tablet (300 mg total) by mouth 3 (three) times daily. Take 50mg 13 hours prior, 7 hours prior, and 1 hour prior to test.    famotidine (PEPCID) 40 MG tablet Take 40 mg by mouth once daily.    finasteride (PROSCAR) 5 mg tablet TAKE 1 TABLET BY MOUTH once daily    furosemide (LASIX) 40 MG tablet Take 1 tablet (40 mg total) by mouth 2 (two) times daily.    GLUCOSAMINE HCL/CHONDR ROSARIO A NA (OSTEO BI-FLEX ORAL) Take 1 tablet by mouth 2 (two) times daily.     JARDIANCE 25 mg tablet TAKE 1 TABLET BY MOUTH EVERY DAY    krill oil 500 mg Cap Take 1 capsule by mouth Daily.    LANTUS SOLOSTAR U-100 INSULIN 100 unit/mL (3 mL) InPn pen INJECT 44 UNITS UNDER THE SKIN EVERY EVENING    levothyroxine (SYNTHROID) 300 MCG Tab Take 1 tablet (300 mcg total) by mouth every morning.    lisinopriL 10 MG tablet Take 1 tablet by mouth once daily    metFORMIN (GLUCOPHAGE-XR) 500 MG ER 24hr tablet Take 1 tablet (500 mg total) by mouth 2 (two) times daily with meals.    mirabegron (MYRBETRIQ) 25 mg Tb24 ER tablet Take 1 tablet (25 mg total) by mouth once daily.    montelukast (SINGULAIR) 10 mg tablet Take 1 tablet (10 mg total) by mouth once daily.    propafenone (RHTHYMOL) 150 MG Tab Take 1 tablet (150 mg total) by mouth every 8 (eight) hours.    RABEprazole (ACIPHEX) 20 mg tablet Take 2 tablets (40 mg total) by mouth once daily.    ranolazine (RANEXA) 1,000 mg Tb12 Take 1 tablet (1,000 mg total) by mouth 2 (two) times daily.    REPATHA SURECLICK 140 mg/mL PnIj INJECT 140mg subcutaneously every 14 days (Patient taking differently: Inject 140 mg into the skin every 14 (fourteen)  "days.)    rivaroxaban (XARELTO) 20 mg Tab Take 20 mg by mouth daily with dinner or evening meal.    ACCU-CHEK GRACE PLUS METER Misc AS DIRECTED    blood sugar diagnostic (ACCU-CHEK GRACE PLUS TEST STRP) Strp TEST THREE TIMES A DAY    inclisiran (LEQVIO) 284 mg/1.5 mL Syrg subcutaneous injection Inject 1.5 mLs (284 mg total) into the skin every 3 (three) months.    lancets (ACCU-CHEK FASTCLIX LANCET DRUM) Misc TEST TWO TIMES A DAY    pen needle, diabetic (BD ULTRA-FINE MINI PEN NEEDLE) 31 gauge x 3/16" Ndle USE AS DIRECTED    sildenafiL (VIAGRA) 100 MG tablet Take 1/2 to 1 tablet by mouth one hour prior to intercourse. Max 100mg per day     Family History       Problem Relation (Age of Onset)    Heart disease Mother, Father, Brother          Tobacco Use    Smoking status: Former     Current packs/day: 0.00     Average packs/day: 0.5 packs/day for 4.0 years (2.0 ttl pk-yrs)     Types: Cigarettes     Start date: 1968     Quit date: 1972     Years since quittin.1    Smokeless tobacco: Former     Types: Chew     Quit date: 1976   Substance and Sexual Activity    Alcohol use: Not Currently    Drug use: No    Sexual activity: Yes     Partners: Female     Review of Systems   Constitutional: Negative.   HENT: Negative.     Eyes: Negative.    Cardiovascular:  Positive for dyspnea on exertion.   Respiratory:  Positive for shortness of breath.    Skin: Negative.    Musculoskeletal: Negative.    Gastrointestinal: Negative.    Genitourinary: Negative.    Neurological: Negative.    Psychiatric/Behavioral: Negative.       Objective:     Vital Signs (Most Recent):  Temp: 97.9 °F (36.6 °C) (24 1143)  Pulse: 69 (24 1143)  Resp: 18 (24 1143)  BP: 134/67 (24 1143)  SpO2: 99 % (24 1143) Vital Signs (24h Range):  Temp:  [97.9 °F (36.6 °C)-98 °F (36.7 °C)] 97.9 °F (36.6 °C)  Pulse:  [] 69  Resp:  [14-29] 18  SpO2:  [97 %-100 %] 99 %  BP: (129-162)/(56-77) 134/67     Weight: 97 kg " (213 lb 13.5 oz)  Body mass index is 33.49 kg/m².    SpO2: 99 %         Intake/Output Summary (Last 24 hours) at 7/16/2024 1242  Last data filed at 7/16/2024 0506  Gross per 24 hour   Intake 10 ml   Output 1100 ml   Net -1090 ml       Lines/Drains/Airways       None                    Physical Exam  Vitals and nursing note reviewed.   Constitutional:       Appearance: Normal appearance.   HENT:      Head: Normocephalic and atraumatic.   Eyes:      General:         Right eye: No discharge.         Left eye: No discharge.      Pupils: Pupils are equal, round, and reactive to light.   Cardiovascular:      Rate and Rhythm: Normal rate and regular rhythm.      Heart sounds: S1 normal and S2 normal. No murmur heard.     No friction rub.   Pulmonary:      Effort: Pulmonary effort is normal. No respiratory distress.      Breath sounds: Normal breath sounds. No rales.   Abdominal:      Palpations: Abdomen is soft.      Tenderness: There is no abdominal tenderness.   Musculoskeletal:      Cervical back: Neck supple.      Right lower leg: No edema.      Left lower leg: No edema.   Skin:     General: Skin is warm and dry.   Neurological:      General: No focal deficit present.      Mental Status: He is alert and oriented to person, place, and time.   Psychiatric:         Mood and Affect: Mood normal.         Behavior: Behavior normal.         Thought Content: Thought content normal.          Significant Labs: BMP:   Recent Labs   Lab 07/15/24  0803 07/15/24  1253 07/16/24  0605   *  138* 88 93     138 140 142   K 4.4  4.4 4.3 3.9     105 105 103   CO2 25 25 24 29   BUN 16  16 16 13   CREATININE 1.3  1.3 1.2 1.2   CALCIUM 9.0  9.0 9.4 9.2   MG  --   --  2.1   , CMP   Recent Labs   Lab 07/15/24  0803 07/15/24  1253 07/16/24  0605     138 140 142   K 4.4  4.4 4.3 3.9     105 105 103   CO2 25 25 24 29   *  138* 88 93   BUN 16  16 16 13   CREATININE 1.3  1.3 1.2 1.2   CALCIUM  "9.0  9.0 9.4 9.2   PROT 7.3 7.1 6.9   ALBUMIN 3.7 3.7 3.5   BILITOT 0.4 0.4 0.4   ALKPHOS 65 64 60   AST 20 19 17   ALT 13 14 11   ANIONGAP 8  8 11 10   , CBC   Recent Labs   Lab 07/15/24  0803 07/15/24  1206 07/16/24  0605   WBC 4.53  4.53 4.65 4.62   HGB 9.2*  9.2* 9.0* 9.0*   HCT 29.9*  29.9* 28.3* 29.4*     229 227 207   , INR   Recent Labs   Lab 07/15/24  0803   INR 1.1   , Lipid Panel No results for input(s): "CHOL", "HDL", "LDLCALC", "TRIG", "CHOLHDL" in the last 48 hours., Troponin   Recent Labs   Lab 07/15/24  1917 07/15/24  2300 07/16/24  0605   TROPONINI 0.012 0.010 0.013   , and All pertinent lab results from the last 24 hours have been reviewed.    Significant Imaging: Cardiac Cath: reviewed, Echocardiogram: Transthoracic echo (TTE) complete (Cupid Only):   Results for orders placed or performed during the hospital encounter of 06/11/24   Echo   Result Value Ref Range    BSA 2.19 m2    LVOT stroke volume 81.02 cm3    LVIDd 4.37 3.5 - 6.0 cm    LV Systolic Volume 38.77 mL    LV Systolic Volume Index 18.3 mL/m2    LVIDs 3.13 2.1 - 4.0 cm    LV Diastolic Volume 86.07 mL    LV Diastolic Volume Index 40.60 mL/m2    IVS 1.45 (A) 0.6 - 1.1 cm    LVOT diameter 2.04 cm    LVOT area 3.3 cm2    FS 28 28 - 44 %    Left Ventricle Relative Wall Thickness 0.65 cm    Posterior Wall 1.42 (A) 0.6 - 1.1 cm    LV mass 246.91 g    LV Mass Index 116 g/m2    MV Peak E Delbert 1.10 m/s    TDI LATERAL 0.11 m/s    TDI SEPTAL 0.07 m/s    E/E' ratio 12.22 m/s    MV Peak A Delbert 1.34 m/s    TR Max Delbert 2.76 m/s    E/A ratio 0.82     IVRT 74.22 msec    E wave deceleration time 246.09 msec    LV SEPTAL E/E' RATIO 15.71 m/s    LV LATERAL E/E' RATIO 10.00 m/s    LVOT peak delbert 0.97 m/s    Left Ventricular Outflow Tract Mean Velocity 0.64 cm/s    Left Ventricular Outflow Tract Mean Gradient 1.94 mmHg    RVOT peak VTI 13.7 cm    TAPSE 1.38 cm    LA size 4.08 cm    Left Atrium Minor Axis 5.61 cm    Left Atrium Major Axis 6.20 cm    " RA Major Axis 5.45 cm    AV regurgitation pressure 1/2 time [id number] ms    AR Max Delbert 3.80 m/s    AV mean gradient 20 mmHg    AV peak gradient 32 mmHg    Ao peak delbert 2.84 m/s    Ao VTI 68.90 cm    LVOT peak VTI 24.80 cm    AV valve area 1.18 cm²    AV Velocity Ratio 0.34     AV index (prosthetic) 0.36     RADHA by Velocity Ratio 1.12 cm²    Triscuspid Valve Regurgitation Peak Gradient 30 mmHg    PV mean gradient 1 mmHg    RVOT peak delbert 0.68 m/s    Ao root annulus 3.52 cm    STJ 3.68 cm    Ascending aorta 3.60 cm    IVC diameter 1.48 cm    Mean e' 0.09 m/s    ZLVIDS -2.11     ZLVIDD -4.27     LA Volume Index 43.4 mL/m2    LA volume 91.92 cm3    LA WIDTH 4.5 cm    RA Width 4.1 cm    TV resting pulmonary artery pressure 33 mmHg    RV TB RVSP 6 mmHg    Est. RA pres 3 mmHg    Narrative      Left Ventricle: The left ventricle is normal in size. Increased wall   thickness. There is moderate asymmetric hypertrophy. Septal motion is   consistent with post-operative status. There is normal systolic function   with a visually estimated ejection fraction of 55 - 60%. Grade II   diastolic dysfunction.    Right Ventricle: Mild right ventricular enlargement. Wall thickness is   normal. Systolic function is mildly reduced.    Left Atrium: Left atrium is moderately dilated.    Aortic Valve: There is severe aortic valve sclerosis. Moderately   restricted motion. There is moderate stenosis. Aortic valve area by VTI is   1.18 cm². Aortic valve peak velocity is 2.84 m/s. Mean gradient is 20   mmHg. The dimensionless index is 0.36. There is mild aortic regurgitation.    Mitral Valve: There is mild regurgitation.    Tricuspid Valve: There is moderate regurgitation.    Pulmonary Artery: The estimated pulmonary artery systolic pressure is   33 mmHg.    IVC/SVC: Normal venous pressure at 3 mmHg.     , EKG: reviewed, Stress Test: reviewed, and X-Ray: CXR: X-Ray Chest 1 View (CXR):   Results for orders placed or performed during the  hospital encounter of 07/15/24   X-Ray Chest 1 View    Narrative    EXAMINATION:  XR CHEST 1 VIEW    CLINICAL HISTORY:  shortness of breath;    TECHNIQUE:  Single frontal view of the chest was performed.    COMPARISON:  None    FINDINGS:  Median sternotomy wires in place.    The lungs are clear, with normal appearance of pulmonary vasculature and no pleural effusion or pneumothorax.    The cardiac silhouette is normal in size. The hilar and mediastinal contours are unremarkable.    Bones are intact.      Impression    No acute abnormality.      Electronically signed by: Darrell Iglesias  Date:    07/15/2024  Time:    12:25

## 2024-07-16 NOTE — ASSESSMENT & PLAN NOTE
Chronic, controlled. Latest blood pressure and vitals reviewed-     Temp:  [98 °F (36.7 °C)-98.3 °F (36.8 °C)]   Pulse:  [68-78]   Resp:  [14-26]   BP: (119-162)/(60-77)   SpO2:  [97 %-100 %] .   Home meds for hypertension were reviewed and noted below.   Hypertension Medications               carvediloL (COREG) 12.5 MG tablet Take 2 tablets (25 mg total) by mouth 2 (two) times daily with meals.    furosemide (LASIX) 40 MG tablet Take 1 tablet (40 mg total) by mouth 2 (two) times daily.    lisinopriL 10 MG tablet Take 1 tablet by mouth once daily            While in the hospital, will manage blood pressure as follows; Continue home antihypertensive regimen    Will utilize p.r.n. blood pressure medication only if patient's blood pressure greater than 180/110 and he develops symptoms such as worsening chest pain or shortness of breath.

## 2024-07-16 NOTE — PHARMACY MED REC
"Admission Medication History     The home medication history was taken by Rosie Howell.    You may go to "Admission" then "Reconcile Home Medications" tabs to review and/or act upon these items.     The home medication list has been updated by the Pharmacy department.   Please read ALL comments highlighted in yellow.   Please address this information as you see fit.    Feel free to contact us if you have any questions or require assistance.      The medications listed below were removed from the home medication list. Please reorder if appropriate:  Patient reports no longer taking the following medication(s):  Xyzal 5 mg  Multivitamin  Prednisone 50 mg    Medications listed below were obtained from: Patient/family and Analytic software- [employer],patient had updated medication list with him also      LAST MED REC COMPLETED:     Rosie Howell  [id number]    Current Outpatient Medications on File Prior to Encounter   Medication Sig Dispense Refill Last Dose    albuterol (PROVENTIL HFA) 90 mcg/actuation inhaler Inhale 2 puffs into the lungs every 6 (six) hours as needed for Wheezing. Rescue   7/14/2024    aspirin (ECOTRIN) 81 MG EC tablet Take 1 tablet (81 mg total) by mouth once daily. 90 tablet 3 7/14/2024    budesonide-formoterol 160-4.5 mcg (SYMBICORT) 160-4.5 mcg/actuation HFAA INHALE 2 PUFFS INTO THE LUNGS TWO TIMES A DAY. (Patient taking differently: Inhale 2 puffs into the lungs every 12 (twelve) hours. INHALE 2 PUFFS INTO THE LUNGS TWO TIMES A DAY.) 10.2 g 11 7/14/2024    carvediloL (COREG) 12.5 MG tablet Take 2 tablets (25 mg total) by mouth 2 (two) times daily with meals. 60 tablet 2 7/14/2024    cimetidine (TAGAMET) 300 MG tablet Take 1 tablet (300 mg total) by mouth 3 (three) times daily. Take 50mg 13 hours prior, 7 hours prior, and 1 hour prior to test. 3 tablet 0 7/14/2024    famotidine (PEPCID) 40 MG tablet Take 40 mg by mouth once daily.   7/14/2024    finasteride (PROSCAR) 5 mg tablet TAKE 1 " TABLET BY MOUTH once daily (Patient taking differently: Take 5 mg by mouth once daily.) 90 tablet 3 7/14/2024    furosemide (LASIX) 40 MG tablet Take 1 tablet (40 mg total) by mouth 2 (two) times daily. 60 tablet 11 7/14/2024    GLUCOSAMINE HCL/CHONDR ROSARIO A NA (OSTEO BI-FLEX ORAL) Take 1 tablet by mouth 2 (two) times daily.    7/14/2024    JARDIANCE 25 mg tablet TAKE 1 TABLET BY MOUTH EVERY DAY (Patient taking differently: Take 25 mg by mouth once daily.) 90 tablet 0 7/14/2024    krill oil 500 mg Cap Take 1 capsule by mouth Daily.   7/14/2024    LANTUS SOLOSTAR U-100 INSULIN 100 unit/mL (3 mL) InPn pen INJECT 44 UNITS UNDER THE SKIN EVERY EVENING 45 mL 0 7/14/2024    levothyroxine (SYNTHROID) 300 MCG Tab Take 1 tablet (300 mcg total) by mouth every morning. 90 tablet 3 7/14/2024    lisinopriL 10 MG tablet Take 1 tablet by mouth once daily 90 tablet 3 7/14/2024    metFORMIN (GLUCOPHAGE-XR) 500 MG ER 24hr tablet Take 1 tablet (500 mg total) by mouth 2 (two) times daily with meals. 180 tablet 3 7/14/2024    mirabegron (MYRBETRIQ) 25 mg Tb24 ER tablet Take 1 tablet (25 mg total) by mouth once daily. 30 tablet 11 7/14/2024    montelukast (SINGULAIR) 10 mg tablet Take 1 tablet (10 mg total) by mouth once daily. 90 tablet 3 7/14/2024    propafenone (RHTHYMOL) 150 MG Tab Take 1 tablet (150 mg total) by mouth every 8 (eight) hours. 90 tablet 11 7/14/2024    RABEprazole (ACIPHEX) 20 mg tablet Take 2 tablets (40 mg total) by mouth once daily. 60 tablet 0 7/14/2024    ranolazine (RANEXA) 1,000 mg Tb12 Take 1 tablet (1,000 mg total) by mouth 2 (two) times daily. 60 tablet 2 7/14/2024    REPATHA SURECLICK 140 mg/mL PnIj INJECT 140mg subcutaneously every 14 days (Patient taking differently: Inject 140 mg into the skin every 14 (fourteen) days.) 2 mL 11 Past Month    rivaroxaban (XARELTO) 20 mg Tab Take 20 mg by mouth daily with dinner or evening meal.   7/14/2024    ACCU-[place]K [place] [facility] [place] 1 each 0     blood  "sugar diagnostic (ACCU-CHEK GRACE PLUS TEST STRP) Strp TEST THREE TIMES A  strip 11     inclisiran (LEQVIO) 284 mg/1.5 mL Syrg subcutaneous injection Inject 1.5 mLs (284 mg total) into the skin every 3 (three) months. 1.5 mL 1     lancets (ACCU-CHEK FASTCLIX LANCET DRUM) Misc TEST TWO TIMES A  each 5     pen needle, diabetic (BD ULTRA-FINE MINI PEN NEEDLE) 31 gauge x 3/16" Ndle USE AS DIRECTED 100 each 11     sildenafiL (VIAGRA) 100 MG tablet Take 1/2 to 1 tablet by mouth one hour prior to intercourse. Max 100mg per day 30 tablet 11 Unknown                       .          "

## 2024-07-16 NOTE — HPI
Patient is a 79-year-old male with past medical history significant for asthma, CAD status post CABG 2018, COPD, cirrhosis, hepatitis-C (2015, treated with Havoni), hypertension, hypothyroidism, obstructive sleep apnea on CPAP, osteoarthritis, esophageal varices, type 2 IDDM, aortic stenosis, BPH with nocturia, bilateral carotid artery stenosis, claudication, hyperlipidemia, glaucoma, peripheral vascular disease, and paroxysmal atrial fibrillation status post ablation procedure on Xarelto who presented to ED with complaint of shortness of breath for the past 3 days, occurring only with exertion. He states that he used to walk about a mile a day, however for the past week he has been unable to walk without getting short of breath, also complaining of some chest pain/tightness and also of bilateral severe leg pain with walking. His symptoms completely subside with rest. He denies fever, chills, lightheadedness, dizziness, headache, abdominal pain, nausea, vomiting, diarrhea, dysuria, edema. He states that the chest tightness is mild and goes away with rest. Vital signs on arrival to ED temp 98.3°, heart rate 78, respirations 20, blood pressure 119/60, 99% SpO2 on room air. While in ED blood pressure has risen to 150s to 160s. Lab workup shows RBC 3.28, hemoglobin 9.0, hematocrit 28.3, platelets 227, iron level 26, TIBC 445, saturating iron 6, transferrin 301, ferritin 17, folate 12.1, vitamin B12 454, PT 12.6, INR 1.1, PTT 34.2, chemistry is normal, , troponin 0.007 trended up to 0.012. EKG sinus rhythm with first-degree AV block, heart rate 74, premature supraventricular contractions noted, . Chest x-ray demonstrates that lungs are clear. He was not given any medications while in ED. Hospital Medicine team was consulted for admission due to chest pain and dyspnea on exertion.     Cardiology consulted to assist with management. Pt seen and examined today, had some MARCUS and musculoskeletal pains. Tele  monitor with Afib rate controlled, on xarelto. June 24' Nuc neg for ischemia and echo EF nml Grade II DD. Labs reviewed, troponin neg, Crt 1.2. Iron level 26, TIBC 445. Planned for watchman procedure with Dr. Huerta

## 2024-07-16 NOTE — H&P
[facility] - Emergency Dept.  [facility] Medicine  History & Physical    Patient Name: Erendira Pretty  MRN: [id number]  Patient Class: OP- Observation  Admission Date: 7/15/2024  Attending Physician: Margaret Garcia MD   Primary Care Provider: Bhupinder Callaway MD         Patient information was obtained from patient, past medical records, and ER records.     Subjective:     Principal Problem:Coronary artery disease with exertional angina    Chief Complaint:   Chief Complaint   Patient presents with    Shortness of Breath     Pt with positive cardiac history c/o SOB on exertion x 3 days, worsening.  He had a cardiac ablation last month.        HPI:   Patient is a 79-year-old male with past medical history significant for asthma, CAD status post CABG 2018, COPD, cirrhosis, hepatitis-C (2015, treated with Havoni),  hypertension, hypothyroidism, obstructive sleep apnea on CPAP, osteoarthritis, esophageal varices, type 2 IDDM,  aortic stenosis, BPH with nocturia, bilateral carotid artery stenosis, claudication, hyperlipidemia, glaucoma, peripheral vascular disease, and paroxysmal atrial fibrillation status post ablation procedure on Xarelto  who presented to ED with complaint of shortness of breath for the past 3 days, occurring only with exertion.  He states that he used to walk about a mile a day, however for the past week he has been unable to walk without getting short of breath, also complaining of some chest pain/tightness and also of bilateral severe leg pain with walking.  His symptoms completely subside with rest.  He denies fever, chills, lightheadedness, dizziness, headache, abdominal pain, nausea, vomiting, diarrhea, dysuria, edema.  He states that the chest tightness is mild and goes away with rest.  Vital signs on arrival to ED temp 98.3°, heart rate 78, respirations 20, blood pressure 119/60, 99% SpO2 on room air.  While in ED blood pressure has risen to 150s to 160s.   Lab workup shows RBC 3.28, hemoglobin  9.0, hematocrit 28.3, platelets 227, iron level 26, TIBC 445, saturating iron 6, transferrin 301, ferritin 17, folate 12.1, vitamin B12 454, PT 12.6, INR 1.1, PTT 34.2, chemistry is normal, , troponin 0.007 trended up to 0.012.   EKG sinus rhythm with first-degree AV block, heart rate 74, premature supraventricular contractions noted, .  Chest x-ray demonstrates that lungs are clear. He was not given any medications while in ED.  Hospital Medicine team was consulted for admission due to chest pain and dyspnea on exertion.    Past Medical History:   Diagnosis Date    Anticoagulant long-term use     Aortic stenosis     Asthma     Benign prostatic hyperplasia with nocturia     Bilateral carotid artery stenosis 07/21/2021    US CAROTID BILATERAL 07/14/2021 IMPRESSION: Atherosclerosis with suspected stenosis greater than 50% at the right proximal ICA visually. Velocities are discordant and appear improved from prior. Atherosclerosis on the left with no evidence of flow-limiting stenosis greater than 50%.  FINDINGS: Right: Internal Carotid Artery (ICA): Peak systolic velocity 118 cm/sec. 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 09/22/2020    DELONTE on CPAP     Osteoarthritis     Paroxysmal atrial fibrillation     PVD (peripheral vascular disease)     Secondary esophageal varices without bleeding 06/26/2017    Type 2 diabetes mellitus        Past Surgical History:   Procedure Laterality Date    ABLATION OF ARRHYTHMOGENIC FOCUS FOR  ATRIAL FIBRILLATION N/A 6/24/2024    Procedure: Ablation atrial fibrillation;  Surgeon: Horacio Huerta MD;  Location: [facility] EP LAB;  Service: Cardiology;  Laterality: N/A;  afib, PVI, RFA, BERNARD (cx if SR), ZIA, anes, MB, 3 Prep, 3 hours per Dr. Huerta    ANGIOGRAM, EXTREMITY, UNILATERAL Left 1/4/2024    Procedure: ANGIOGRAM, EXTREMITY, UNILATERAL- Femoral / SMS Stent, Poss General;  Surgeon: ALEX Alfred III, MD;  Location: [facility] 2ND FLR;  Service: Vascular;  Laterality: Left;  mGy : 2698.78  Gy.cm : 229.88  Contrast : 62ml  Fluro time : 13.8min    CARDIAC CATHETERIZATION      CARDIAC SURGERY      CARPAL TUNNEL RELEASE Left     CATARACT EXTRACTION      CATARACT EXTRACTION W/ INTRAOCULAR LENS  IMPLANT, BILATERAL  2008    CATHETERIZATION OF BOTH LEFT AND RIGHT HEART N/A 11/2/2018    Procedure: CATHETERIZATION, HEART, BOTH LEFT AND RIGHT;  Surgeon: Frank Gallardo MD;  Location: [facility] CATH LAB;  Service: Cardiology;  Laterality: N/A;    COLONOSCOPY  8/2013    COLONOSCOPY N/A 5/30/2016    Procedure: COLONOSCOPY;  Surgeon: Anna Tomas MD;  Location: [facility] ENDO;  Service: Endoscopy;  Laterality: N/A;    COLONOSCOPY N/A 7/17/2019    Procedure: COLONOSCOPY;  Surgeon: David Carter III, MD;  Location: [facility] ENDO;  Service: Endoscopy;  Laterality: N/A;    COLONOSCOPY N/A 12/14/2023    Procedure: COLONOSCOPY;  Surgeon: Ama Barrera MD;  Location: [facility] ENDO;  Service: Endoscopy;  Laterality: N/A;    COMPUTED TOMOGRAPHY N/A 9/9/2019    Procedure: CT (COMPUTED TOMOGRAPHY);  Surgeon: [facility] Diagnostic Provider;  Location: [facility] OR;  Service: General;  Laterality: N/A;  Microwave ablation to be done in CT.  Need CRNA and cart    COMPUTED TOMOGRAPHY N/A 1/25/2022    Procedure: Liver Microwave Ablation;  Surgeon: Red Puentes MD;  [facility];  Service: General;  Laterality: N/A;    CORONARY ARTERY BYPASS GRAFT (CABG) N/A 11/5/2018    Procedure: CORONARY ARTERY BYPASS GRAFT (CABG);  Surgeon: Bear ELAM  MD Annamarie;  Location: [facility] OR;  Service: Cardiothoracic;  Laterality: N/A;  TWO VESSEL BYPASS WITH AORTOTOMY AND EXCISION OF ATHEROSCLEROTIC PLAQUE    CORONARY BYPASS GRAFT ANGIOGRAPHY  3/2/2020    Procedure: Bypass graft study;  Surgeon: Cora Cormier MD;  Location: [facility] CATH LAB;  Service: Cardiology;;    ECHOCARDIOGRAM,TRANSESOPHAGEAL N/A 6/24/2024    Procedure: Transesophageal echo (BERNARD) intra-procedure log documentation;  Surgeon: Nacho Downs MD;  Location: [facility] EP LAB;  Service: Cardiology;  Laterality: N/A;    ELBOW BURSA SURGERY Right 2010    ENDOSCOPIC HARVEST OF VEIN Left 11/5/2018    Procedure: SURGICAL PROCUREMENT, VEIN, ENDOSCOPIC;  Surgeon: Bear De Luna MD;  Location: [facility];  Service: Cardiothoracic;  Laterality: Left;    ESOPHAGOGASTRODUODENOSCOPY N/A 7/17/2019    Procedure: ESOPHAGOGASTRODUODENOSCOPY (EGD);  Surgeon: David Carter III, MD;  Location: [facility];  Service: Endoscopy;  Laterality: N/A;    ESOPHAGOGASTRODUODENOSCOPY N/A 12/29/2022    Procedure: ESOPHAGOGASTRODUODENOSCOPY (EGD);  Surgeon: Issa Gayle MD;  Location: [facility];  Service: Endoscopy;  Laterality: N/A;    ESOPHAGOGASTRODUODENOSCOPY N/A 1/17/2024    Procedure: EGD (ESOPHAGOGASTRODUODENOSCOPY);  Surgeon: Issa Gayle MD;  Location: [facility] ENDO;  Service: Endoscopy;  Laterality: N/A;    ESOPHAGOGASTRODUODENOSCOPY N/A 4/30/2024    Procedure: EGD (ESOPHAGOGASTRODUODENOSCOPY);  Surgeon: Eder Foley MD;  Location: [facility] ENDO;  Service: Gastroenterology;  Laterality: N/A;    ETHMOIDECTOMY Bilateral 3/27/2019    Procedure: ETHMOIDECTOMY;  Surgeon: Alejandro Parkinson MD;  Location: [facility];  Service: ENT;  Laterality: Bilateral;    EYE SURGERY      FOOT SURGERY      FRONTAL SINUS OBLITERATION Bilateral 3/27/2019    Procedure: SINUSOTOMY, FRONTAL SINUS, OBLITERATIVE;  Surgeon: Alejandro Parkinson MD;  Location: [facility] OR;  Service: ENT;  Laterality: Bilateral;    FUNCTIONAL ENDOSCOPIC SINUS SURGERY (FESS)  Bilateral 3/27/2019    Procedure: FESS (FUNCTIONAL ENDOSCOPIC SINUS SURGERY);  Surgeon: Alejandro Parkinson MD;  Location: [facility];  Service: ENT;  Laterality: Bilateral;  Left Keila bullosa resection     INTRALUMINAL GASTROINTESTINAL TRACT IMAGING VIA CAPSULE N/A 2/2/2024    Procedure: IMAGING PROCEDURE, GI TRACT, INTRALUMINAL, VIA CAPSULE;  Surgeon: Cooper Estrella RN;  Location: [facility] ENDO;  Service: Endoscopy;  Laterality: N/A;    KNEE ARTHROSCOPY W/ MENISCAL REPAIR Left 06/2019    dr boykin    KNEE SURGERY Right     LEFT HEART CATHETERIZATION Left 3/2/2020    Procedure: CATHETERIZATION, HEART, LEFT;  Surgeon: Cora Cormier MD;  Location: [facility] CATH LAB;  Service: Cardiology;  Laterality: Left;    LIVER BIOPSY  01/2022    MAXILLARY ANTROSTOMY Bilateral 3/27/2019    Procedure: MAXILLARY ANTROSTOMY;  Surgeon: Alejandro Parkinson MD;  Location: [facility] OR;  Service: ENT;  Laterality: Bilateral;    NASAL SEPTOPLASTY N/A 3/27/2019    Procedure: SEPTOPLASTY, NOSE;  Surgeon: Alejandro Parkinson MD;  Location: [facility] OR;  Service: ENT;  Laterality: N/A;    RECTAL SURGERY  2012    STENT, SUPERIOR MESENTERIC ARTERY Left 1/4/2024    Procedure: STENT, SUPERIOR MESENTERIC ARTERY;  Surgeon: ALEX Alfred III, MD;  Location: [facility] OR [address] [place] [address];  Service: Vascular;  Laterality: Left;    TRANSURETHRAL RESECTION OF PROSTATE (TURP) WITHOUT USE OF LASER N/A 6/19/2018    Procedure: TURP, WITHOUT USING LASER;  Surgeon: Cooper Cordova IV, MD;  Location: [facility] OR;  Service: Urology;  Laterality: N/A;    TRIGGER FINGER RELEASE Left        Review of patient's allergies indicates:   Allergen Reactions    Lipitor [atorvastatin] Other (See Comments)     Muscle aches and pains as well as fatigue.     Niacin preparations Other (See Comments)     Causes broken blood vessels and bruises at 4 times normal dose.    Statins-hmg-coa reductase inhibitors Other (See Comments)     Statins cause intolerable myalgias.    Iodinated contrast media Hives     Tachycardia    Omeprazole  Hives and Itching    Prilosec [omeprazole magnesium] Hives and Itching       Current Facility-Administered Medications on File Prior to Encounter   Medication    lactated ringers infusion     Current Outpatient Medications on File Prior to Encounter   Medication Sig    albuterol (PROVENTIL HFA) 90 mcg/actuation inhaler Inhale 2 puffs into the lungs every 6 (six) hours as needed for Wheezing. Rescue    aspirin (ECOTRIN) 81 MG EC tablet Take 1 tablet (81 mg total) by mouth once daily.    budesonide-formoterol 160-4.5 mcg (SYMBICORT) 160-4.5 mcg/actuation HFAA INHALE 2 PUFFS INTO THE LUNGS TWO TIMES A DAY. (Patient taking differently: Inhale 2 puffs into the lungs every 12 (twelve) hours. INHALE 2 PUFFS INTO THE LUNGS TWO TIMES A DAY.)    carvediloL (COREG) 12.5 MG tablet Take 2 tablets (25 mg total) by mouth 2 (two) times daily with meals.    cimetidine (TAGAMET) 300 MG tablet Take 1 tablet (300 mg total) by mouth 3 (three) times daily. Take 50mg 13 hours prior, 7 hours prior, and 1 hour prior to test.    famotidine (PEPCID) 40 MG tablet Take 40 mg by mouth once daily.    finasteride (PROSCAR) 5 mg tablet TAKE 1 TABLET BY MOUTH once daily (Patient taking differently: Take 5 mg by mouth once daily.)    furosemide (LASIX) 40 MG tablet Take 1 tablet (40 mg total) by mouth 2 (two) times daily.    GLUCOSAMINE HCL/CHONDR ROSARIO A NA (OSTEO BI-FLEX ORAL) Take 1 tablet by mouth 2 (two) times daily.     JARDIANCE 25 mg tablet TAKE 1 TABLET BY MOUTH EVERY DAY (Patient taking differently: Take 25 mg by mouth once daily.)    krill oil 500 mg Cap Take 1 capsule by mouth Daily.    LANTUS SOLOSTAR U-100 INSULIN 100 unit/mL (3 mL) InPn pen INJECT 44 UNITS UNDER THE SKIN EVERY EVENING    levothyroxine (SYNTHROID) 300 MCG Tab Take 1 tablet (300 mcg total) by mouth every morning.    lisinopriL 10 MG tablet Take 1 tablet by mouth once daily    metFORMIN (GLUCOPHAGE-XR) 500 MG ER 24hr tablet Take 1 tablet (500 mg total) by mouth 2 (two) times  "daily with meals.    mirabegron (MYRBETRIQ) 25 mg Tb24 ER tablet Take 1 tablet (25 mg total) by mouth once daily.    montelukast (SINGULAIR) 10 mg tablet Take 1 tablet (10 mg total) by mouth once daily.    propafenone (RHTHYMOL) 150 MG Tab Take 1 tablet (150 mg total) by mouth every 8 (eight) hours.    RABEprazole (ACIPHEX) 20 mg tablet Take 2 tablets (40 mg total) by mouth once daily.    ranolazine (RANEXA) 1,000 mg Tb12 Take 1 tablet (1,000 mg total) by mouth 2 (two) times daily.    REPATHA SURECLICK 140 mg/mL PnIj INJECT 140mg subcutaneously every 14 days (Patient taking differently: Inject 140 mg into the skin every 14 (fourteen) days.)    rivaroxaban (XARELTO) 20 mg Tab Take 20 mg by mouth daily with dinner or evening meal.    ACCU-CHEK GRACE PLUS METER Misc AS DIRECTED    blood sugar diagnostic (ACCU-CHEK GRACE PLUS TEST STRP) Strp TEST THREE TIMES A DAY    inclisiran (LEQVIO) 284 mg/1.5 mL Syrg subcutaneous injection Inject 1.5 mLs (284 mg total) into the skin every 3 (three) months.    lancets (ACCU-CHEK FASTCLIX LANCET DRUM) Misc TEST TWO TIMES A DAY    pen needle, diabetic (BD ULTRA-FINE MINI PEN NEEDLE) 31 gauge x 3/16" Ndle USE AS DIRECTED    sildenafiL (VIAGRA) 100 MG tablet Take 1/2 to 1 tablet by mouth one hour prior to intercourse. Max 100mg per day    [DISCONTINUED] inclisiran (LEQVIO) 284 mg/1.5 mL Syrg subcutaneous injection Inject 1.5 mLs (284 mg total) into the skin every 6 (six) months.    [DISCONTINUED] levocetirizine (XYZAL) 5 MG tablet Take 1 tablet (5 mg total) by mouth every evening.    [DISCONTINUED] levothyroxine (SYNTHROID) 50 MCG tablet Take 1 tablet (50 mcg total) by mouth before breakfast.    [DISCONTINUED] multivitamin (THERAGRAN) per tablet Take 1 tablet by mouth once daily.    [DISCONTINUED] predniSONE (DELTASONE) 50 MG Tab Take 1 tablet (50 mg total) by mouth once daily. Take 50mg 13 hours prior, 7 hours prior, and 1 hour prior to test.     Family History       Problem Relation " (Age of Onset)    Heart disease Mother, Father, Brother          Tobacco Use    Smoking status: Former     Current packs/day: 0.00     Average packs/day: 0.5 packs/day for 4.0 years (2.0 ttl pk-yrs)     Types: Cigarettes     Start date: 1968     Quit date: 1972     Years since quittin.1    Smokeless tobacco: Former     Types: Chew     Quit date: 1976   Substance and Sexual Activity    Alcohol use: Not Currently    Drug use: No    Sexual activity: Yes     Partners: Female     Review of Systems   Constitutional:  Positive for activity change. Negative for chills, diaphoresis, fatigue and fever.        CP and SOB with walking   HENT: Negative.     Eyes: Negative.    Respiratory:  Positive for chest tightness and shortness of breath. Negative for cough and wheezing.    Cardiovascular:  Positive for chest pain. Negative for palpitations and leg swelling.        Intermittent chest pain/tightness --only occurring with exertion/walking   Gastrointestinal: Negative.  Negative for abdominal distention, abdominal pain, nausea and vomiting.   Endocrine: Negative.    Genitourinary:  Negative for dysuria, flank pain and hematuria.        +nocturia   Musculoskeletal:  Positive for gait problem.        Leg pain bilaterally with walking/standing too long -- described as severe, resolves with rest   Skin: Negative.    Allergic/Immunologic: Negative.    Neurological:  Negative for dizziness, syncope, weakness, light-headedness, numbness and headaches.   Hematological: Negative.    Psychiatric/Behavioral: Negative.     All other systems reviewed and are negative.    Objective:     Vital Signs (Most Recent):  Temp: 98 °F (36.7 °C) (07/15/24 1830)  Pulse: 70 (07/15/24 1908)  Resp: 16 (07/15/24 1908)  BP: (!) 162/77 (07/15/24 1908)  SpO2: 100 % (07/15/24 1908) Vital Signs (24h Range):  Temp:  [98 °F (36.7 °C)-98.3 °F (36.8 °C)] 98 °F (36.7 °C)  Pulse:  [68-78] 70  Resp:  [14-26] 16  SpO2:  [97 %-100 %] 100 %  BP:  (119-162)/(60-77) 162/77     Weight: 101.2 kg (223 lb 1.7 oz)  Body mass index is 34.94 kg/m².     Physical Exam  Vitals reviewed.   Constitutional:       General: He is not in acute distress.     Appearance: He is obese. He is not ill-appearing, toxic-appearing or diaphoretic.   HENT:      Head: Normocephalic and atraumatic.      Nose: Nose normal.      Mouth/Throat:      Mouth: Mucous membranes are moist.      Pharynx: Oropharynx is clear.   Eyes:      Extraocular Movements: Extraocular movements intact.      Pupils: Pupils are equal, round, and reactive to light.   Cardiovascular:      Rate and Rhythm: Normal rate and regular rhythm.      Pulses: Normal pulses.      Heart sounds: Murmur heard.   Pulmonary:      Effort: Pulmonary effort is normal. No respiratory distress.      Breath sounds: Normal breath sounds. No stridor. No wheezing, rhonchi or rales.   Chest:      Chest wall: No tenderness.   Abdominal:      General: Bowel sounds are normal. There is no distension.      Palpations: Abdomen is soft.      Tenderness: There is no abdominal tenderness. There is no right CVA tenderness, left CVA tenderness or guarding.   Musculoskeletal:         General: No swelling, tenderness, deformity or signs of injury. Normal range of motion.      Cervical back: Normal range of motion and neck supple.      Right lower leg: No edema.      Left lower leg: No edema.   Skin:     General: Skin is warm and dry.      Capillary Refill: Capillary refill takes less than 2 seconds.   Neurological:      General: No focal deficit present.      Mental Status: He is alert and oriented to person, place, and time.      Motor: No weakness.   Psychiatric:         Mood and Affect: Mood normal.         Behavior: Behavior normal.         Thought Content: Thought content normal.              CRANIAL NERVES     CN III, IV, VI   Pupils are equal, round, and reactive to light.       Significant Labs: All pertinent labs within the past 24 hours have  been reviewed.    CBC:   Recent Labs   Lab 07/15/24  0803 07/15/24  1206   WBC 4.53  4.53 4.65   HGB 9.2*  9.2* 9.0*   HCT 29.9*  29.9* 28.3*     229 227     Iron studies:   iron level 26, TIBC 445, saturating iron 6, transferrin 301, ferritin 17, folate 12.1, vitamin B12 454    CMP:   Recent Labs   Lab 07/15/24  0803 07/15/24  1253     138 140   K 4.4  4.4 4.3     105 105   CO2 25  25 24   *  138* 88   BUN 16  16 16   CREATININE 1.3  1.3 1.2   CALCIUM 9.0  9.0 9.4   PROT 7.3 7.1   ALBUMIN 3.7 3.7   BILITOT 0.4 0.4   ALKPHOS 65 64   AST 20 19   ALT 13 14   ANIONGAP 8  8 11       Troponin:   Recent Labs   Lab 07/15/24  1253 07/15/24  1917   TROPONINI 0.007 0.012     PT 12.6, INR 1.1, PTT 31.0      EKG reviewed sinus rhythm with first-degree AV block, heart rate 74, premature supraventricular contractions noted,     Significant Imaging: I have reviewed all pertinent imaging results/findings within the past 24 hours.    Chest x-ray reviewed, per my interpretation lungs are clear    Assessment/Plan:     * Coronary artery disease with exertional angina  Patient with known CAD s/p stent placement and CABG, which is uncontrolled Will continue  Xarelto and ASA and monitor for S/Sx of angina/ACS. Continue to monitor on telemetry.  Statin intolerance noted in records    Patient complains of chest tightness and shortness of breath with walking, also with severe leg pain with walking  All symptoms resolve with rest  Had recent ablation procedure  Cardiac monitoring ordered  Trending troponin levels-1st 2 are normal  No significant EKG changes noted  Consult cardiology, he is followed by Dr. Sami MARCUS (dyspnea on exertion)  Likely multifactorial given significant past medical history  Dyspnea occurs with walking, resolves with rest, no oxygen requirement  Does not appear fluid overloaded, CXR is clear  Continue home inhalational therapies, PRN nebs ordered      Pain in  both lower extremities  Likely due to severe peripheral vascular disease  Pain is immediately relieved with rest  Supportive care      Iron deficiency anemia due to chronic blood loss  Patient's anemia is currently worsening. Has not received any PRBCs to date. Etiology likely d/t chronic blood loss, Iron deficiency, and chronic disease due to Chronic Kidney Disease  Current CBC reviewed-   Lab Results   Component Value Date    HGB 9.0 (L) 07/15/2024    HCT 28.3 (L) 07/15/2024     Monitor serial CBC and transfuse if patient becomes hemodynamically unstable, symptomatic or H/H drops below 7/21.  Iron level and ferritin levels are low  We will give IV iron supplementation while in hospital    Paroxysmal atrial fibrillation  Atrial fibrillation which is controlled currently with Beta Blocker and recent ablation procedure . Patient is currently in sinus rhythm.[id number] Score: 4. Anticoagulation indicated. Anticoagulation done with Xarelto .    Type 2 diabetes mellitus with microalbuminuria, with long-term current use of insulin  Serial glucose checks ordered  Order PRN sliding scale --moderate  Diabetic diet ordered  Holding metformin and Lantus for now if becomes hyperglycemic will restart Lantus      Essential hypertension  Chronic, controlled. Latest blood pressure and vitals reviewed-     Temp:  [98 °F (36.7 °C)-98.3 °F (36.8 °C)]   Pulse:  [68-78]   Resp:  [14-26]   BP: (119-162)/(60-77)   SpO2:  [97 %-100 %] .   Home meds for hypertension were reviewed and noted below.   Hypertension Medications               carvediloL (COREG) 12.5 MG tablet Take 2 tablets (25 mg total) by mouth 2 (two) times daily with meals.    furosemide (LASIX) 40 MG tablet Take 1 tablet (40 mg total) by mouth 2 (two) times daily.    lisinopriL 10 MG tablet Take 1 tablet by mouth once daily            While in the hospital, will manage blood pressure as follows; Continue home antihypertensive regimen    Will utilize p.r.n. blood pressure  medication only if patient's blood pressure greater than 180/110 and he develops symptoms such as worsening chest pain or shortness of breath.    PVD (peripheral vascular disease)  Continue ASA and Xarelto  Reports significant leg pain with walking and/or standing too long      GERD (gastroesophageal reflux disease)  Continue PPI      Mixed hyperlipidemia  Intolerant of statins      Mild intermittent asthma without complication  PRN nebs ordered  Patient reports that he does not use an inhaler regularly      DELONTE on CPAP  Nightly CPAP ordered      Benign prostatic hyperplasia with nocturia  Continue home medication or hospital equivalent      Acquired hypothyroidism  Continue Synthroid 300 mcg daily  TSH done about 1 month ago and was 1.151        VTE Risk Mitigation (From admission, onward)           Ordered     rivaroxaban tablet 20 mg  With dinner         07/15/24 2046     Reason for No Pharmacological VTE Prophylaxis  Once        Question:  Reasons:  Answer:  Already adequately anticoagulated on oral Anticoagulants    07/15/24 2013     IP VTE HIGH RISK PATIENT  Once         07/15/24 2013     Place sequential compression device  Until discontinued         07/15/24 2013                  On 07/15/2024, patient should be placed in hospital observation services under my care in collaboration with Dr. Margaret Garcia.       Eriak Wagner NP  Department of Hospital Medicine  [facility] - Emergency Dept.

## 2024-07-16 NOTE — PLAN OF CARE
Pt ready for discharge. No s/s of distress noted. Pt free from injury. IV discontinued. Tele monitor discontinued. Belongings with pt. Discharge instructions reviewed with pt. Pt verbalized understanding. F/u appts made.

## 2024-07-16 NOTE — ASSESSMENT & PLAN NOTE
Patient with known CAD s/p stent placement and CABG, which is uncontrolled Will continue  Xarelto and ASA and monitor for S/Sx of angina/ACS. Continue to monitor on telemetry.  Statin intolerance noted in records    Patient complains of chest tightness and shortness of breath with walking, also with severe leg pain with walking  All symptoms resolve with rest  Had recent ablation procedure  Cardiac monitoring ordered  Trending troponin levels-1st 2 are normal  No significant EKG changes noted  Consult cardiology, he is followed by Dr. Gallardo

## 2024-07-16 NOTE — ASSESSMENT & PLAN NOTE
Patient with known CAD s/p stent placement and CABG, which is uncontrolled Will continue  Xarelto and ASA and monitor for S/Sx of angina/ACS. Continue to monitor on telemetry.  Statin intolerance noted in records    Patient complains of chest tightness and shortness of breath with walking, also with severe leg pain with walking  All symptoms resolve with rest  Had recent ablation procedure  Cardiac monitoring ordered  Trending troponin levels-1st 2 are normal  No significant EKG changes noted  Consult cardiology, he is followed by Dr. Gallardo  Patient to follow up with Dr. Gallardo tomorrow 7/17/24 at 8:40 a.m.

## 2024-07-17 ENCOUNTER — HOSPITAL ENCOUNTER (OUTPATIENT)
Dept: CARDIOLOGY | Facility: HOSPITAL | Age: 79
Discharge: HOME | End: 2024-07-17
Attending: INTERNAL MEDICINE
Payer: MEDICARE

## 2024-07-17 ENCOUNTER — OFFICE VISIT (OUTPATIENT)
Dept: CARDIOLOGY | Facility: CLINIC | Age: 79
End: 2024-07-17
Payer: MEDICARE

## 2024-07-17 VITALS
HEART RATE: 85 BPM | WEIGHT: 220 LBS | HEIGHT: 67 IN | OXYGEN SATURATION: 97 % | SYSTOLIC BLOOD PRESSURE: 138 MMHG | DIASTOLIC BLOOD PRESSURE: 80 MMHG | BODY MASS INDEX: 34.53 KG/M2

## 2024-07-17 DIAGNOSIS — Z00.00 ROUTINE ADULT HEALTH MAINTENANCE: ICD-10-CM

## 2024-07-17 DIAGNOSIS — I10 ESSENTIAL HYPERTENSION: Chronic | ICD-10-CM

## 2024-07-17 DIAGNOSIS — T45.515D ANTICOAGULANT CAUSING ADVERSE EFFECT IN THERAPEUTIC USE, SUBSEQUENT ENCOUNTER: ICD-10-CM

## 2024-07-17 DIAGNOSIS — D50.0 IRON DEFICIENCY ANEMIA DUE TO CHRONIC BLOOD LOSS: ICD-10-CM

## 2024-07-17 DIAGNOSIS — Z95.1 S/P CABG X 2: ICD-10-CM

## 2024-07-17 DIAGNOSIS — I48.0 PAROXYSMAL ATRIAL FIBRILLATION: ICD-10-CM

## 2024-07-17 DIAGNOSIS — R07.9 CHEST PAIN, MODERATE CORONARY ARTERY RISK: Primary | ICD-10-CM

## 2024-07-17 DIAGNOSIS — C22.0 HCC (HEPATOCELLULAR CARCINOMA): ICD-10-CM

## 2024-07-17 DIAGNOSIS — K21.00 GASTROESOPHAGEAL REFLUX DISEASE WITH ESOPHAGITIS WITHOUT HEMORRHAGE: ICD-10-CM

## 2024-07-17 DIAGNOSIS — I25.10 CORONARY ARTERY DISEASE INVOLVING NATIVE CORONARY ARTERY OF NATIVE HEART WITHOUT ANGINA PECTORIS: Chronic | ICD-10-CM

## 2024-07-17 DIAGNOSIS — G47.33 OSA ON CPAP: Chronic | ICD-10-CM

## 2024-07-17 DIAGNOSIS — E78.2 MIXED HYPERLIPIDEMIA: ICD-10-CM

## 2024-07-17 DIAGNOSIS — Z76.89 ENCOUNTER TO ESTABLISH CARE: ICD-10-CM

## 2024-07-17 DIAGNOSIS — I35.0 NONRHEUMATIC AORTIC VALVE STENOSIS: ICD-10-CM

## 2024-07-17 DIAGNOSIS — K92.1 MELENA: ICD-10-CM

## 2024-07-17 LAB
OHS QRS DURATION: 86 MS
OHS QTC CALCULATION: 430 MS

## 2024-07-17 PROCEDURE — 1160F RVW MEDS BY RX/DR IN RCRD: CPT | Mod: CPTII,S$GLB,, | Performed by: INTERNAL MEDICINE

## 2024-07-17 PROCEDURE — 3079F DIAST BP 80-89 MM HG: CPT | Mod: CPTII,S$GLB,, | Performed by: INTERNAL MEDICINE

## 2024-07-17 PROCEDURE — 1157F ADVNC CARE PLAN IN RCRD: CPT | Mod: CPTII,S$GLB,, | Performed by: INTERNAL MEDICINE

## 2024-07-17 PROCEDURE — 3072F LOW RISK FOR RETINOPATHY: CPT | Mod: CPTII,S$GLB,, | Performed by: INTERNAL MEDICINE

## 2024-07-17 PROCEDURE — 93005 ELECTROCARDIOGRAM TRACING: CPT | Mod: PO

## 2024-07-17 PROCEDURE — 93010 ELECTROCARDIOGRAM REPORT: CPT | Mod: ,,, | Performed by: INTERNAL MEDICINE

## 2024-07-17 PROCEDURE — 1126F AMNT PAIN NOTED NONE PRSNT: CPT | Mod: CPTII,S$GLB,, | Performed by: INTERNAL MEDICINE

## 2024-07-17 PROCEDURE — 3075F SYST BP GE 130 - 139MM HG: CPT | Mod: CPTII,S$GLB,, | Performed by: INTERNAL MEDICINE

## 2024-07-17 PROCEDURE — 1159F MED LIST DOCD IN RCRD: CPT | Mod: CPTII,S$GLB,, | Performed by: INTERNAL MEDICINE

## 2024-07-17 PROCEDURE — 99214 OFFICE O/P EST MOD 30 MIN: CPT | Mod: S$GLB,,, | Performed by: INTERNAL MEDICINE

## 2024-07-17 PROCEDURE — 99999 PR PBB SHADOW E&M-EST. PATIENT-LVL III: CPT | Mod: PBBFAC,,, | Performed by: INTERNAL MEDICINE

## 2024-07-17 NOTE — PROGRESS NOTES
Subjective:   Patient ID:  Erendira Pretty is a 79 y.o. male who presents for follow-up of No chief complaint on file.  Pt is s/p PVI 6-24 by Dr Bradley STALLINGS noted 8/23. He was stared on rythmol but no rhythm control via CV attempt was made. He remained in AF 2/7/24, SR with long first degree AVB later in February.   He had a vascular intervention in his abdomen 1/24 with subsequent GI bleeding later that month.   He has been off OAC since the GI bleeding event. Xarelto was restarted     GI bleeding 4/27/24 with no source identified. On xarelto right before PVI and afterwards. Remains in SR on rythmol with first degree AVB.    LAAO planned 8-3-24     CHADSVASC of 4  HAS BLED of 4    ER visit 7-15-24 with complaints of shortness of breath for the  3 days, occurring only with exertion.   Lab workup shows RBC 3.28, hemoglobin 9.0, hematocrit 28.3, platelets 227, iron level 26, TIBC 445, saturating iron 6, transferrin 301, ferritin 17, folate 12.1, vitamin B12 454, PT 12.6, INR 1.1, PTT 34.2, chemistry is normal, , troponin 0.007 trended up to 0.012. EKG sinus rhythm with first-degree AV block, heart rate 74, premature supraventricular contractions noted, .   Chest x-ray demonstrates that lungs are clear   Pt given IV iron and discharged    Patient denies CP, angina or anginal equivalent today  EKG no changes    Hypertension  This is a chronic problem. The current episode started more than 1 year ago. The problem has been gradually improving since onset. The problem is controlled. Pertinent negatives include no chest pain, palpitations or shortness of breath. Past treatments include ACE inhibitors, beta blockers and calcium channel blockers. The current treatment provides moderate improvement. There are no compliance problems.  Hypertensive end-organ damage includes CAD/MI.   Coronary Artery Disease  Presents for follow-up visit. Pertinent negatives include no chest pain, chest pressure, chest tightness,  dizziness, leg swelling, muscle weakness, palpitations, shortness of breath or weight gain. The symptoms have been stable. Compliance with diet is variable. Compliance with exercise is variable. Compliance with medications is good.   Hyperlipidemia  This is a chronic problem. The current episode started more than 1 year ago. The problem is controlled. Pertinent negatives include no chest pain or shortness of breath. Treatments tried: repatha. The current treatment provides moderate improvement of lipids. There are no compliance problems.        Review of Systems   Constitutional: Negative. Negative for weight gain.   HENT: Negative.     Eyes: Negative.    Cardiovascular: Negative.  Negative for chest pain, leg swelling and palpitations.   Respiratory: Negative.  Negative for chest tightness and shortness of breath.    Endocrine: Negative.    Hematologic/Lymphatic: Negative.    Skin: Negative.    Musculoskeletal:  Negative for muscle weakness.   Gastrointestinal: Negative.    Genitourinary: Negative.    Neurological: Negative.  Negative for dizziness.   Psychiatric/Behavioral: Negative.     Allergic/Immunologic: Negative.    All other systems reviewed and are negative.    Family History   Problem Relation Name Age of Onset    Heart disease Mother      Heart disease Father      Heart disease Brother      Colon cancer Neg Hx       Past Medical History:   Diagnosis Date    Anticoagulant long-term use     Aortic stenosis     Asthma     Benign prostatic hyperplasia with nocturia     Bilateral carotid artery stenosis 07/21/2021     CAROTID BILATERAL 07/14/2021 IMPRESSION: Atherosclerosis with suspected stenosis greater than 50% at the right proximal ICA visually. Velocities are discordant and appear improved from prior. Atherosclerosis on the left with no evidence of flow-limiting stenosis greater than 50%.  FINDINGS: Right: Internal Carotid Artery (ICA): Peak systolic velocity 118 cm/sec. 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 both eyes 2020    DELONTE on CPAP     Osteoarthritis     Paroxysmal atrial fibrillation     PVD (peripheral vascular disease)     Secondary esophageal varices without bleeding 2017    Type 2 diabetes mellitus      Social History     Socioeconomic History    Marital status:      Spouse name: YAEL    Number of children: 2   Tobacco Use    Smoking status: Former     Current packs/day: 0.00     Average packs/day: 0.5 packs/day for 4.0 years (2.0 ttl pk-yrs)     Types: Cigarettes     Start date: 1968     Quit date: 1972     Years since quittin.1    Smokeless tobacco: Former     Types: Chew     Quit date: 1976   Substance and Sexual Activity    Alcohol use: Not Currently    Drug use: No    Sexual activity: Yes     Partners: Female   Social History Narrative    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     Current Outpatient Medications on File Prior to Visit   Medication Sig Dispense Refill    ACCU-CHEK GRACE PLUS METER Misc AS DIRECTED 1 each 0    albuterol (PROVENTIL HFA) 90 mcg/actuation inhaler Inhale 2 puffs into the lungs every 6 (six) hours as needed for Wheezing. Rescue      aspirin (ECOTRIN) 81 MG EC tablet Take 1 tablet (81 mg total) by mouth once daily. 90 tablet 3    blood sugar diagnostic (ACCU-CHEK GRACE PLUS TEST STRP) Strp TEST THREE TIMES A  strip 11     budesonide-formoterol 160-4.5 mcg (SYMBICORT) 160-4.5 mcg/actuation HFAA INHALE 2 PUFFS INTO THE LUNGS TWO TIMES A DAY. (Patient taking differently: Inhale 2 puffs into the lungs every 12 (twelve) hours. INHALE 2 PUFFS INTO THE LUNGS TWO TIMES A DAY.) 10.2 g 11    carvediloL (COREG) 12.5 MG tablet Take 2 tablets (25 mg total) by mouth 2 (two) times daily with meals. 60 tablet 2    cimetidine (TAGAMET) 300 MG tablet Take 1 tablet (300 mg total) by mouth 3 (three) times daily. Take 50mg 13 hours prior, 7 hours prior, and 1 hour prior to test. 3 tablet 0    famotidine (PEPCID) 40 MG tablet Take 40 mg by mouth once daily.      finasteride (PROSCAR) 5 mg tablet TAKE 1 TABLET BY MOUTH once daily 90 tablet 3    furosemide (LASIX) 40 MG tablet Take 1 tablet (40 mg total) by mouth 2 (two) times daily. 60 tablet 11    GLUCOSAMINE HCL/CHONDR ROSARIO A NA (OSTEO BI-FLEX ORAL) Take 1 tablet by mouth 2 (two) times daily.       inclisiran (LEQVIO) 284 mg/1.5 mL Syrg subcutaneous injection Inject 1.5 mLs (284 mg total) into the skin every 3 (three) months. 1.5 mL 1    JARDIANCE 25 mg tablet TAKE 1 TABLET BY MOUTH EVERY DAY 90 tablet 0    krill oil 500 mg Cap Take 1 capsule by mouth Daily.      lancets (ACCU-CHEK FASTCLIX LANCET DRUM) Misc TEST TWO TIMES A  each 5    LANTUS SOLOSTAR U-100 INSULIN 100 unit/mL (3 mL) InPn pen INJECT 44 UNITS UNDER THE SKIN EVERY EVENING 45 mL 0    levothyroxine (SYNTHROID) 300 MCG Tab Take 1 tablet (300 mcg total) by mouth every morning. 90 tablet 3    lisinopriL 10 MG tablet Take 1 tablet by mouth once daily 90 tablet 3    metFORMIN (GLUCOPHAGE-XR) 500 MG ER 24hr tablet Take 1 tablet (500 mg total) by mouth 2 (two) times daily with meals. 180 tablet 3    mirabegron (MYRBETRIQ) 25 mg Tb24 ER tablet Take 1 tablet (25 mg total) by mouth once daily. 30 tablet 11    montelukast (SINGULAIR) 10 mg tablet Take 1 tablet (10 mg total) by mouth once daily. 90 tablet 3    pen needle, diabetic (BD ULTRA-FINE  "MINI PEN NEEDLE) 31 gauge x 3/16" Ndle USE AS DIRECTED 100 each 11    propafenone (RHTHYMOL) 150 MG Tab Take 1 tablet (150 mg total) by mouth every 8 (eight) hours. 90 tablet 11    RABEprazole (ACIPHEX) 20 mg tablet Take 2 tablets (40 mg total) by mouth once daily. 60 tablet 0    ranolazine (RANEXA) 1,000 mg Tb12 Take 1 tablet (1,000 mg total) by mouth 2 (two) times daily. 60 tablet 2    REPATHA SURECLICK 140 mg/mL PnIj INJECT 140mg subcutaneously every 14 days (Patient taking differently: Inject 140 mg into the skin every 14 (fourteen) days.) 2 mL 11    rivaroxaban (XARELTO) 20 mg Tab Take 20 mg by mouth daily with dinner or evening meal.      sildenafiL (VIAGRA) 100 MG tablet Take 1/2 to 1 tablet by mouth one hour prior to intercourse. Max 100mg per day 30 tablet 11     Current Facility-Administered Medications on File Prior to Visit   Medication Dose Route Frequency Provider Last Rate Last Admin    lactated ringers infusion   Intravenous Continuous Omaira Theodore MD   New Bag at 09/09/19 1117    [DISCONTINUED] acetaminophen tablet 650 mg  650 mg Oral Q4H PRN Erika Wagner NP        [DISCONTINUED] albuterol-ipratropium 2.5 mg-0.5 mg/3 mL nebulizer solution 3 mL  3 mL Nebulization Q6H PRN Erika Wagner NP        [DISCONTINUED] arformoteroL nebulizer solution 15 mcg  15 mcg Nebulization BID Margaret Garcia MD        [DISCONTINUED] aspirin EC tablet 81 mg  81 mg Oral Daily Erika Wagner NP        [DISCONTINUED] budesonide nebulizer solution 0.5 mg  0.5 mg Nebulization Q12H Margaret Garcia MD        [DISCONTINUED] carvediloL tablet 25 mg  25 mg Oral BID WM Erika Wagner NP        [DISCONTINUED] dextrose 10% bolus 125 mL 125 mL  12.5 g Intravenous PRN Erika Wagner NP        [DISCONTINUED] dextrose 10% bolus 250 mL 250 mL  25 g Intravenous PRN Erika Wagner NP        [DISCONTINUED] famotidine tablet 40 mg  40 mg Oral Daily Erika Wagner NP        [DISCONTINUED] finasteride tablet 5 mg  " 5 mg Oral Daily Erika Wagner NP        [DISCONTINUED] furosemide tablet 40 mg  40 mg Oral BID Erika Wagner NP   40 mg at 07/15/24 2238    [DISCONTINUED] glucagon (human recombinant) injection 1 mg  1 mg Intramuscular PRN Erika Wagner NP        [DISCONTINUED] glucose chewable tablet 16 g  16 g Oral PRN Erika Wagner NP        [DISCONTINUED] glucose chewable tablet 24 g  24 g Oral PRN Erika Wagner NP        [DISCONTINUED] insulin aspart U-100 pen 0-10 Units  0-10 Units Subcutaneous QID (AC + HS) PRN Erika Wagner NP        [DISCONTINUED] levothyroxine tablet 300 mcg  300 mcg Oral QAM Erika Wagner NP        [DISCONTINUED] lisinopriL tablet 10 mg  10 mg Oral Daily Erika Wagner NP        [DISCONTINUED] melatonin tablet 6 mg  6 mg Oral Nightly PRN Erika Wagner NP        [DISCONTINUED] montelukast tablet 10 mg  10 mg Oral Daily Erika Wagner NP        [DISCONTINUED] naloxone 0.4 mg/mL injection 0.02 mg  0.02 mg Intravenous PRN Erika Wagner NP        [DISCONTINUED] ondansetron injection 4 mg  4 mg Intravenous Q8H PRN Erika Wagner NP        [DISCONTINUED] oxybutynin 24 hr tablet 10 mg  10 mg Oral Daily Erika Wagner NP        [DISCONTINUED] polyethylene glycol packet 17 g  17 g Oral Daily PRN Erika Wagner NP        [DISCONTINUED] propafenone tablet 150 mg  150 mg Oral Q8H Erika Wagner NP   150 mg at 07/16/24 0551    [DISCONTINUED] ranolazine 12 hr tablet 1,000 mg  1,000 mg Oral BID Erika Wagner NP   1,000 mg at 07/15/24 2237    [DISCONTINUED] rivaroxaban tablet 20 mg  20 mg Oral Daily with dinner Erika Wagner NP        [DISCONTINUED] sodium chloride 0.9% flush 10 mL  10 mL Intravenous Q8H Erika Wagner NP   10 mL at 07/16/24 0551     Review of patient's allergies indicates:   Allergen Reactions    Lipitor [atorvastatin] Other (See Comments)     Muscle aches and pains as well as fatigue.     Niacin preparations Other (See Comments)     Causes broken blood  vessels and bruises at 4 times normal dose.    Statins-hmg-coa reductase inhibitors Other (See Comments)     Statins cause intolerable myalgias.    Iodinated contrast media Hives     Tachycardia    Omeprazole Hives and Itching    Prilosec [omeprazole magnesium] Hives and Itching       Objective:     Physical Exam  Vitals and nursing note reviewed.   Constitutional:       Appearance: He is well-developed.   HENT:      Head: Normocephalic and atraumatic.   Eyes:      Conjunctiva/sclera: Conjunctivae normal.      Pupils: Pupils are equal, round, and reactive to light.   Cardiovascular:      Rate and Rhythm: Normal rate and regular rhythm.      Pulses: Intact distal pulses.      Heart sounds: Normal heart sounds. Murmur: 2/6 AS murmur.   Pulmonary:      Effort: Pulmonary effort is normal.      Breath sounds: Normal breath sounds.   Abdominal:      General: Bowel sounds are normal.      Palpations: Abdomen is soft.   Musculoskeletal:      Cervical back: Normal range of motion and neck supple.   Skin:     General: Skin is warm and dry.   Neurological:      Mental Status: He is alert and oriented to person, place, and time.         Assessment:     1. Chest pain, moderate coronary artery risk    2. Coronary artery disease involving native coronary artery of native heart without angina pectoris    3. Essential hypertension    4. Mixed hyperlipidemia    5. Nonrheumatic aortic valve stenosis    6. Paroxysmal atrial fibrillation    7. S/P CABG x 2    8. DELONTE on CPAP    9. Anticoagulant causing adverse effect in therapeutic use, subsequent encounter    10. Melena    11. Gastroesophageal reflux disease with esophagitis without hemorrhage    12. Iron deficiency anemia due to chronic blood loss    13. HCC (hepatocellular carcinoma)        Plan:     Chest pain, moderate coronary artery risk    Coronary artery disease involving native coronary artery of native heart without angina pectoris    Essential hypertension    Mixed  hyperlipidemia    Nonrheumatic aortic valve stenosis    Paroxysmal atrial fibrillation    S/P CABG x 2    DELONTE on CPAP    Anticoagulant causing adverse effect in therapeutic use, subsequent encounter    Melena    Gastroesophageal reflux disease with esophagitis without hemorrhage    Iron deficiency anemia due to chronic blood loss    HCC (hepatocellular carcinoma)      Continue lisinopril , norvasc , coreg- HTN  continue repatha - HLP  Continue propafenone- PAF

## 2024-07-18 ENCOUNTER — OFFICE VISIT (OUTPATIENT)
Dept: HEMATOLOGY/ONCOLOGY | Facility: CLINIC | Age: 79
End: 2024-07-18
Payer: MEDICARE

## 2024-07-18 ENCOUNTER — OFFICE VISIT (OUTPATIENT)
Dept: INTERNAL MEDICINE | Facility: CLINIC | Age: 79
End: 2024-07-18
Payer: MEDICARE

## 2024-07-18 VITALS
WEIGHT: 221.31 LBS | HEIGHT: 67 IN | DIASTOLIC BLOOD PRESSURE: 77 MMHG | SYSTOLIC BLOOD PRESSURE: 118 MMHG | OXYGEN SATURATION: 98 % | HEART RATE: 74 BPM | BODY MASS INDEX: 34.73 KG/M2

## 2024-07-18 VITALS
SYSTOLIC BLOOD PRESSURE: 118 MMHG | RESPIRATION RATE: 16 BRPM | BODY MASS INDEX: 35.09 KG/M2 | DIASTOLIC BLOOD PRESSURE: 72 MMHG | HEIGHT: 67 IN | HEART RATE: 53 BPM | WEIGHT: 223.56 LBS | TEMPERATURE: 97 F | OXYGEN SATURATION: 99 %

## 2024-07-18 DIAGNOSIS — K21.9 GASTROESOPHAGEAL REFLUX DISEASE, UNSPECIFIED WHETHER ESOPHAGITIS PRESENT: ICD-10-CM

## 2024-07-18 DIAGNOSIS — D50.0 IRON DEFICIENCY ANEMIA DUE TO CHRONIC BLOOD LOSS: ICD-10-CM

## 2024-07-18 DIAGNOSIS — D50.0 IRON DEFICIENCY ANEMIA DUE TO CHRONIC BLOOD LOSS: Primary | ICD-10-CM

## 2024-07-18 DIAGNOSIS — K92.2 LOWER GI BLEED: Primary | ICD-10-CM

## 2024-07-18 DIAGNOSIS — K74.69 OTHER CIRRHOSIS OF LIVER: ICD-10-CM

## 2024-07-18 DIAGNOSIS — K21.9 GASTROESOPHAGEAL REFLUX DISEASE WITHOUT ESOPHAGITIS: ICD-10-CM

## 2024-07-18 DIAGNOSIS — C22.0 HCC (HEPATOCELLULAR CARCINOMA): ICD-10-CM

## 2024-07-18 DIAGNOSIS — R19.5 DARK STOOLS: ICD-10-CM

## 2024-07-18 PROCEDURE — 99999 PR PBB SHADOW E&M-EST. PATIENT-LVL III: CPT | Mod: PBBFAC,,, | Performed by: NURSE PRACTITIONER

## 2024-07-18 PROCEDURE — 1126F AMNT PAIN NOTED NONE PRSNT: CPT | Mod: CPTII,S$GLB,, | Performed by: NURSE PRACTITIONER

## 2024-07-18 PROCEDURE — 1160F RVW MEDS BY RX/DR IN RCRD: CPT | Mod: CPTII,S$GLB,, | Performed by: PEDIATRICS

## 2024-07-18 PROCEDURE — 1157F ADVNC CARE PLAN IN RCRD: CPT | Mod: CPTII,S$GLB,, | Performed by: NURSE PRACTITIONER

## 2024-07-18 PROCEDURE — 1160F RVW MEDS BY RX/DR IN RCRD: CPT | Mod: CPTII,S$GLB,, | Performed by: NURSE PRACTITIONER

## 2024-07-18 PROCEDURE — 1126F AMNT PAIN NOTED NONE PRSNT: CPT | Mod: CPTII,S$GLB,, | Performed by: PEDIATRICS

## 2024-07-18 PROCEDURE — 1159F MED LIST DOCD IN RCRD: CPT | Mod: CPTII,S$GLB,, | Performed by: NURSE PRACTITIONER

## 2024-07-18 PROCEDURE — 3074F SYST BP LT 130 MM HG: CPT | Mod: CPTII,S$GLB,, | Performed by: PEDIATRICS

## 2024-07-18 PROCEDURE — 3288F FALL RISK ASSESSMENT DOCD: CPT | Mod: CPTII,S$GLB,, | Performed by: NURSE PRACTITIONER

## 2024-07-18 PROCEDURE — 3078F DIAST BP <80 MM HG: CPT | Mod: CPTII,S$GLB,, | Performed by: NURSE PRACTITIONER

## 2024-07-18 PROCEDURE — 99215 OFFICE O/P EST HI 40 MIN: CPT | Mod: S$GLB,,, | Performed by: NURSE PRACTITIONER

## 2024-07-18 PROCEDURE — 99495 TRANSJ CARE MGMT MOD F2F 14D: CPT | Mod: S$GLB,,, | Performed by: PEDIATRICS

## 2024-07-18 PROCEDURE — 3072F LOW RISK FOR RETINOPATHY: CPT | Mod: CPTII,S$GLB,, | Performed by: PEDIATRICS

## 2024-07-18 PROCEDURE — 3074F SYST BP LT 130 MM HG: CPT | Mod: CPTII,S$GLB,, | Performed by: NURSE PRACTITIONER

## 2024-07-18 PROCEDURE — 1101F PT FALLS ASSESS-DOCD LE1/YR: CPT | Mod: CPTII,S$GLB,, | Performed by: NURSE PRACTITIONER

## 2024-07-18 PROCEDURE — 3072F LOW RISK FOR RETINOPATHY: CPT | Mod: CPTII,S$GLB,, | Performed by: NURSE PRACTITIONER

## 2024-07-18 PROCEDURE — 1157F ADVNC CARE PLAN IN RCRD: CPT | Mod: CPTII,S$GLB,, | Performed by: PEDIATRICS

## 2024-07-18 PROCEDURE — 99999 PR PBB SHADOW E&M-EST. PATIENT-LVL V: CPT | Mod: PBBFAC,,, | Performed by: PEDIATRICS

## 2024-07-18 PROCEDURE — 1101F PT FALLS ASSESS-DOCD LE1/YR: CPT | Mod: CPTII,S$GLB,, | Performed by: PEDIATRICS

## 2024-07-18 PROCEDURE — 3078F DIAST BP <80 MM HG: CPT | Mod: CPTII,S$GLB,, | Performed by: PEDIATRICS

## 2024-07-18 PROCEDURE — 3288F FALL RISK ASSESSMENT DOCD: CPT | Mod: CPTII,S$GLB,, | Performed by: PEDIATRICS

## 2024-07-18 PROCEDURE — 1159F MED LIST DOCD IN RCRD: CPT | Mod: CPTII,S$GLB,, | Performed by: PEDIATRICS

## 2024-07-18 RX ORDER — RABEPRAZOLE SODIUM 20 MG/1
20 TABLET, DELAYED RELEASE ORAL 2 TIMES DAILY
Qty: 180 TABLET | Refills: 3 | Status: SHIPPED | OUTPATIENT
Start: 2024-07-18

## 2024-07-18 RX ORDER — DIPHENHYDRAMINE HYDROCHLORIDE 50 MG/ML
50 INJECTION INTRAMUSCULAR; INTRAVENOUS ONCE AS NEEDED
Status: CANCELLED | OUTPATIENT
Start: 2024-07-18

## 2024-07-18 RX ORDER — EPINEPHRINE 0.3 MG/.3ML
0.3 INJECTION SUBCUTANEOUS ONCE AS NEEDED
Status: CANCELLED | OUTPATIENT
Start: 2024-07-18

## 2024-07-18 RX ORDER — METHYLPREDNISOLONE SOD SUCC 125 MG
60 VIAL (EA) INJECTION
Status: CANCELLED
Start: 2024-07-18

## 2024-07-18 NOTE — PROGRESS NOTES
Subjective:      Patient ID: Erendira Pretty is a 79 y.o. male.    Chief Complaint: no complaints    HPI:  78 yo male presents to the hematology clinic as a new patient for further evaluation and treatment of iron deficiency anemia.  He has been referred to the clinic by his pcp, Dr. Callaway.  He was seen many years ago in the clinic by Dr. Ramón Westbrook for SEBASTIAN and received IV iron at that time.  Is being followed by hepatology for h/o of hcv cured with cirrhosis and esophageal varices. Liver CA diagnosed x 2.   Has recently reported incident of melena which has resolved.     States that all of his anticoagulants and antiplatelets have been stopped.  Is only taking asa 81 mg PO daily.     States that he has been taking an oral iron supplement for about 15 days not.  He denies an GI upset.       Left leg numbness that started 2/26/2024 about 3 weeks after stopping antiplatelets and anticoagulation.  States it is ok in the morning but when he starts to move around the leg becomes numb.     Family hx of cancer:  Brother: lung cancer  Brother #2: skin cancer  Self: HCC     Drink a few beers a day starting last week.  Denies cigarette smoking.  Denies illicit drug use.       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     Denies f/c/ns.  Had some unintentional weight loss from December till now - about 20 lbs before stent was placed - due to abdominal pain and diarrhea.       Denies any known abnormal lymphadenopathy.       Interval History:  5/6/2024  Found with SEBASTIAN due to GI blood loss and was given 2 doses of Feraheme with last dose received on 3/26/2024.  With continued anemia - hgb 9.6 mcv 89, saturated iron 5%.  [facility] was hospitalized last Sunday for melena x 1 episode.  EGD done 4/30/2024 - did not find H. Pylori or evidence of bleeding.  Currently being followed by GI.  [facility] has no episodes since Sunday before last.  Only on baby asa now.    Interval  History:  2024 Received Feraheme infusions x 2 with last dose received on 6/3/2024.  On 7/15/2024 discharged from hospital originally admitted for shortness of breath and pain on legs with ambulation that resolves when stopping.  Is back on Xarelto and ASA and will be having watchman procedure done soon.  Stool is dark color.  Patient states that she did receive IV push iron in the hospital.  States he is currently having issues with constipation.  Has taken slowfe in the past and was unable to tolerate.      I have reviewed all of the patient's relevant lab work available in the medical record and have utilized this in my evaluation and management recommendations today.      Social History     Socioeconomic History    Marital status:      Spouse name: YALE    Number of children: 2   Tobacco Use    Smoking status: Former     Current packs/day: 0.00     Average packs/day: 0.5 packs/day for 4.0 years (2.0 ttl pk-yrs)     Types: Cigarettes     Start date: 1968     Quit date: 1972     Years since quittin.1    Smokeless tobacco: Former     Types: Chew     Quit date: 1976   Substance and Sexual Activity    Alcohol use: Not Currently    Drug use: No    Sexual activity: Yes     Partners: Female   Social History Narrative    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       Family History   Problem Relation Name Age of Onset    Heart disease Mother      Heart disease Father      Heart disease Brother      Colon cancer Neg Hx         Past Surgical History:   Procedure Laterality Date    ABLATION OF ARRHYTHMOGENIC FOCUS FOR ATRIAL FIBRILLATION N/A 2024    Procedure: Ablation atrial fibrillation;  Surgeon: Horacio Huerta MD;  Location: [facility] EP LAB;  Service: Cardiology;  Laterality: N/A;  afib, PVI, RFA, BERNARD (cx if SR), ZIA, anes, MB, 3 Prep, 3 hours per Dr. Huerta     ANGIOGRAM, EXTREMITY, UNILATERAL Left 1/4/2024    Procedure: ANGIOGRAM, EXTREMITY, UNILATERAL- Femoral / SMS Stent, Poss General;  Surgeon: ALEX Alfred III, MD;  Location: [address];  Service: Vascular;  Laterality: Left;  mGy : 2698.78  Gy.cm : 229.88  Contrast : 62ml  Fluro time : 13.8min    CARDIAC CATHETERIZATION      CARDIAC SURGERY      CARPAL TUNNEL RELEASE Left     CATARACT EXTRACTION      CATARACT EXTRACTION W/ INTRAOCULAR LENS  IMPLANT, BILATERAL  2008    CATHETERIZATION OF BOTH LEFT AND RIGHT HEART N/A 11/2/2018    Procedure: CATHETERIZATION, HEART, BOTH LEFT AND RIGHT;  Surgeon: Frank Gallardo MD;  Location: [facility] CATH LAB;  Service: Cardiology;  Laterality: N/A;    COLONOSCOPY  8/2013    COLONOSCOPY N/A 5/30/2016    Procedure: COLONOSCOPY;  Surgeon: Anna Tomas MD;  Location: [facility] ENDO;  Service: Endoscopy;  Laterality: N/A;    COLONOSCOPY N/A 7/17/2019    Procedure: COLONOSCOPY;  Surgeon: David Carter III, MD;  Location: [facility] ENDO;  Service: Endoscopy;  Laterality: N/A;    COLONOSCOPY N/A 12/14/2023    Procedure: COLONOSCOPY;  Surgeon: Ama Barrera MD;  Location: [facility] ENDO;  Service: Endoscopy;  Laterality: N/A;    COMPUTED TOMOGRAPHY N/A 9/9/2019    Procedure: CT (COMPUTED TOMOGRAPHY);  Surgeon: [facility] Diagnostic Provider;  Location: [facility];  Service: General;  Laterality: N/A;  Microwave ablation to be done in CT.  Need CRNA and cart    COMPUTED TOMOGRAPHY N/A 1/25/2022    Procedure: Liver Microwave Ablation;  Surgeon: Red Puentes MD;  Location: [facility];  Service: General;  Laterality: N/A;    CORONARY ARTERY BYPASS GRAFT (CABG) N/A 11/5/2018    Procedure: CORONARY ARTERY BYPASS GRAFT (CABG);  Surgeon: Bear De Luna MD;  Location: [facility];  Service: Cardiothoracic;  Laterality: N/A;  TWO VESSEL BYPASS WITH AORTOTOMY AND EXCISION OF ATHEROSCLEROTIC PLAQUE    CORONARY BYPASS GRAFT ANGIOGRAPHY  3/2/2020    Procedure: Bypass graft study;  Surgeon: Cora Cormier MD;   Location: [facility] CATH LAB;  Service: Cardiology;;    ECHOCARDIOGRAM,TRANSESOPHAGEAL N/A 6/24/2024    Procedure: Transesophageal echo (BERNARD) intra-procedure log documentation;  Surgeon: Nacho Downs MD;  Location: [facility] EP LAB;  Service: Cardiology;  Laterality: N/A;    ELBOW BURSA SURGERY Right 2010    ENDOSCOPIC HARVEST OF VEIN Left 11/5/2018    Procedure: SURGICAL PROCUREMENT, VEIN, ENDOSCOPIC;  Surgeon: Bear De Luna MD;  Location: [facility] OR;  Service: Cardiothoracic;  Laterality: Left;    ESOPHAGOGASTRODUODENOSCOPY N/A 7/17/2019    Procedure: ESOPHAGOGASTRODUODENOSCOPY (EGD);  Surgeon: David Carter III, MD;  Location: [facility] ENDO;  Service: Endoscopy;  Laterality: N/A;    ESOPHAGOGASTRODUODENOSCOPY N/A 12/29/2022    Procedure: ESOPHAGOGASTRODUODENOSCOPY (EGD);  Surgeon: Issa Gayle MD;  Location: [facility] ENDO;  Service: Endoscopy;  Laterality: N/A;    ESOPHAGOGASTRODUODENOSCOPY N/A 1/17/2024    Procedure: EGD (ESOPHAGOGASTRODUODENOSCOPY);  Surgeon: Issa Gayle MD;  Location: [facility];  Service: Endoscopy;  Laterality: N/A;    ESOPHAGOGASTRODUODENOSCOPY N/A 4/30/2024    Procedure: EGD (ESOPHAGOGASTRODUODENOSCOPY);  Surgeon: Eder Foley MD;  Location: [facility] ENDO;  Service: Gastroenterology;  Laterality: N/A;    ETHMOIDECTOMY Bilateral 3/27/2019    Procedure: ETHMOIDECTOMY;  Surgeon: Alejandro Parkinson MD;  Location: [facility] OR;  Service: ENT;  Laterality: Bilateral;    EYE SURGERY      FOOT SURGERY      FRONTAL SINUS OBLITERATION Bilateral 3/27/2019    Procedure: SINUSOTOMY, FRONTAL SINUS, OBLITERATIVE;  Surgeon: Alejandro Parkinson MD;  Location: [facility];  Service: ENT;  Laterality: Bilateral;    FUNCTIONAL ENDOSCOPIC SINUS SURGERY (FESS) Bilateral 3/27/2019    Procedure: FESS (FUNCTIONAL ENDOSCOPIC SINUS SURGERY);  Surgeon: Alejandro Parkinson MD;  Location: [facility] OR;  Service: ENT;  Laterality: Bilateral;  Left Keila bullosa resection     INTRALUMINAL GASTROINTESTINAL TRACT IMAGING VIA CAPSULE N/A 2/2/2024     Procedure: IMAGING PROCEDURE, GI TRACT, INTRALUMINAL, VIA CAPSULE;  Surgeon: Cooper Estrella RN;  Location: [facility] ENDO;  Service: Endoscopy;  Laterality: N/A;    KNEE ARTHROSCOPY W/ MENISCAL REPAIR Left 06/2019    dr boykin    KNEE SURGERY Right     LEFT HEART CATHETERIZATION Left 3/2/2020    Procedure: CATHETERIZATION, HEART, LEFT;  Surgeon: Cora Cormier MD;  Location: [facility] CATH LAB;  Service: Cardiology;  Laterality: Left;    LIVER BIOPSY  01/2022    MAXILLARY ANTROSTOMY Bilateral 3/27/2019    Procedure: MAXILLARY ANTROSTOMY;  Surgeon: Alejandro Parkinson MD;  Location: [facility] OR;  Service: ENT;  Laterality: Bilateral;    NASAL SEPTOPLASTY N/A 3/27/2019    Procedure: SEPTOPLASTY, NOSE;  Surgeon: Alejandro Parkinson MD;  Location: [facility] OR;  Service: ENT;  Laterality: N/A;    RECTAL SURGERY  2012    STENT, SUPERIOR MESENTERIC ARTERY Left 1/4/2024    Procedure: STENT, SUPERIOR MESENTERIC ARTERY;  Surgeon: ALEX Alfred III, MD;  Location: [address];  Service: Vascular;  Laterality: Left;    TRANSURETHRAL RESECTION OF PROSTATE (TURP) WITHOUT USE OF LASER N/A 6/19/2018    Procedure: TURP, WITHOUT USING LASER;  Surgeon: Cooper Cordova IV, MD;  Location: [facility] OR;  Service: Urology;  Laterality: N/A;    TRIGGER FINGER RELEASE Left        Past Medical History:   Diagnosis Date    Anticoagulant long-term use     Aortic stenosis     Asthma     Benign prostatic hyperplasia with nocturia     Bilateral carotid artery stenosis 07/21/2021    US CAROTID BILATERAL 07/14/2021 IMPRESSION: Atherosclerosis with suspected stenosis greater than 50% at the right proximal ICA visually. Velocities are discordant and appear improved from prior. Atherosclerosis on the left with no evidence of flow-limiting stenosis greater than 50%.  FINDINGS: Right: Internal Carotid Artery (ICA): Peak systolic velocity 118 cm/sec. End diastolic velocity 35 cm/sec    BPH (benign prostatic hyperplasia)     CAD (coronary artery disease)     40% lesion 2002;violet     Cirrhosis     Claudication 04/09/2014    Colon polyp     COPD (chronic obstructive pulmonary disease)     ED (erectile dysfunction)     Encounter for blood transfusion     Ex-smoker     Hearing loss NEC     Hepatitis C     Cured;dr gibbs harvoni 2015    Hyperlipidemia     Hypertension     Hypothyroid     s/p tx graves    Open angle with borderline findings and low glaucoma risk in both eyes 09/22/2020    DELONTE on CPAP     Osteoarthritis     Paroxysmal atrial fibrillation     PVD (peripheral vascular disease)     Secondary esophageal varices without bleeding 06/26/2017    Type 2 diabetes mellitus        Review of Systems   Constitutional: Negative.  Negative for fatigue.   HENT:  Positive for rhinorrhea. Negative for nosebleeds.    Eyes: Negative.    Respiratory: Negative.  Negative for shortness of breath.    Cardiovascular: Negative.    Gastrointestinal:  Positive for constipation. Negative for anal bleeding and blood in stool.        Brown stools   Endocrine: Negative.    Genitourinary: Negative.  Negative for hematuria.   Musculoskeletal: Negative.    Skin: Negative.    Allergic/Immunologic: Negative.    Neurological: Negative.    Hematological: Negative.    Psychiatric/Behavioral: Negative.            Medication List with Changes/Refills   Current Medications    ACCU-CHEK GRACE PLUS METER MISC    AS DIRECTED    ALBUTEROL (PROVENTIL HFA) 90 MCG/ACTUATION INHALER    Inhale 2 puffs into the lungs every 6 (six) hours as needed for Wheezing. Rescue    ASPIRIN (ECOTRIN) 81 MG EC TABLET    Take 1 tablet (81 mg total) by mouth once daily.    BLOOD SUGAR DIAGNOSTIC (ACCU-CHEK GRACE PLUS TEST STRP) STRP    TEST THREE TIMES A DAY    BUDESONIDE-FORMOTEROL 160-4.5 MCG (SYMBICORT) 160-4.5 MCG/ACTUATION HFAA    INHALE 2 PUFFS INTO THE LUNGS TWO TIMES A DAY.    CARVEDILOL (COREG) 12.5 MG TABLET    Take 2 tablets (25 mg total) by mouth 2 (two) times daily with meals.    CIMETIDINE (TAGAMET) 300 MG TABLET    Take 1 tablet (300  "mg total) by mouth 3 (three) times daily. Take 50mg 13 hours prior, 7 hours prior, and 1 hour prior to test.    FAMOTIDINE (PEPCID) 40 MG TABLET    Take 40 mg by mouth once daily.    FINASTERIDE (PROSCAR) 5 MG TABLET    TAKE 1 TABLET BY MOUTH once daily    FUROSEMIDE (LASIX) 40 MG TABLET    Take 1 tablet (40 mg total) by mouth 2 (two) times daily.    GLUCOSAMINE HCL/CHONDR ROSARIO A NA (OSTEO BI-FLEX ORAL)    Take 1 tablet by mouth 2 (two) times daily.     INCLISIRAN (LEQVIO) 284 MG/1.5 ML SYRG SUBCUTANEOUS INJECTION    Inject 1.5 mLs (284 mg total) into the skin every 3 (three) months.    JARDIANCE 25 MG TABLET    TAKE 1 TABLET BY MOUTH EVERY DAY    KRILL  MG CAP    Take 1 capsule by mouth Daily.    LANCETS (ACCU-CHEK FASTCLIX LANCET DRUM) MISC    TEST TWO TIMES A DAY    LANTUS SOLOSTAR U-100 INSULIN 100 UNIT/ML (3 ML) INPN PEN    INJECT 44 UNITS UNDER THE SKIN EVERY EVENING    LEVOTHYROXINE (SYNTHROID) 300 MCG TAB    Take 1 tablet (300 mcg total) by mouth every morning.    LISINOPRIL 10 MG TABLET    Take 1 tablet by mouth once daily    METFORMIN (GLUCOPHAGE-XR) 500 MG ER 24HR TABLET    Take 1 tablet (500 mg total) by mouth 2 (two) times daily with meals.    MIRABEGRON (MYRBETRIQ) 25 MG TB24 ER TABLET    Take 1 tablet (25 mg total) by mouth once daily.    MONTELUKAST (SINGULAIR) 10 MG TABLET    Take 1 tablet (10 mg total) by mouth once daily.    PEN NEEDLE, DIABETIC (BD ULTRA-FINE MINI PEN NEEDLE) 31 GAUGE X 3/16" NDLE    USE AS DIRECTED    PROPAFENONE (RHTHYMOL) 150 MG TAB    Take 1 tablet (150 mg total) by mouth every 8 (eight) hours.    RABEPRAZOLE (ACIPHEX) 20 MG TABLET    Take 2 tablets (40 mg total) by mouth once daily.    RANOLAZINE (RANEXA) 1,000 MG TB12    Take 1 tablet (1,000 mg total) by mouth 2 (two) times daily.    REPATHA SURECLICK 140 MG/ML PNIJ    INJECT 140mg subcutaneously every 14 days    RIVAROXABAN (XARELTO) 20 MG TAB    Take 20 mg by mouth daily with dinner or evening meal.    SILDENAFIL " (VIAGRA) 100 MG TABLET    Take 1/2 to 1 tablet by mouth one hour prior to intercourse. Max 100mg per day        Objective:     Vitals:    07/18/24 0721   BP: 118/77   Pulse: 74       Physical Exam  Vitals reviewed.   Constitutional:       Appearance: Normal appearance.   HENT:      Head: Normocephalic and atraumatic.      Right Ear: External ear normal.      Left Ear: External ear normal.   Cardiovascular:      Rate and Rhythm: Normal rate and regular rhythm.      Heart sounds: S1 normal and S2 normal. Murmur heard.      Systolic murmur is present with a grade of 3/6.   Pulmonary:      Effort: Pulmonary effort is normal.      Breath sounds: Normal breath sounds.   Abdominal:      General: There is no distension.   Musculoskeletal:         General: Normal range of motion.      Cervical back: Normal range of motion.   Skin:     General: Skin is warm and dry.   Neurological:      General: No focal deficit present.      Mental Status: He is alert and oriented to person, place, and time.   Psychiatric:         Attention and Perception: Attention and perception normal.         Mood and Affect: Mood and affect normal.         Speech: Speech normal.         Behavior: Behavior normal. Behavior is cooperative.         Thought Content: Thought content normal.         Cognition and Memory: Cognition and memory normal.         Judgment: Judgment normal.         Assessment:     Problem List Items Addressed This Visit          Oncology    HCC (hepatocellular carcinoma)    Relevant Orders    CBC Auto Differential    Comprehensive Metabolic Panel       GI    Other cirrhosis of liver    Relevant Orders    Ambulatory referral/consult to Endo Procedure     CBC Auto Differential    Comprehensive Metabolic Panel    GERD (gastroesophageal reflux disease)    Relevant Orders    CBC Auto Differential    Comprehensive Metabolic Panel     Other Visit Diagnoses       Lower GI bleed    -  Primary    Relevant Orders    Ambulatory  referral/consult to Endo Procedure     CBC Auto Differential    Comprehensive Metabolic Panel    Ferritin    Iron and TIBC    Dark stools        Relevant Orders    CBC Auto Differential    Comprehensive Metabolic Panel    Ferritin    Iron and TIBC            Lab Results   Component Value Date    WBC 4.62 07/16/2024    RBC 3.34 (L) 07/16/2024    HGB 9.0 (L) 07/16/2024    HCT 29.4 (L) 07/16/2024    MCV 88 07/16/2024    MCH 26.9 (L) 07/16/2024    MCHC 30.6 (L) 07/16/2024    RDW 20.6 (H) 07/16/2024     07/16/2024    MPV 9.8 07/16/2024    GRAN 3.0 07/16/2024    GRAN 64.8 07/16/2024    LYMPH 0.8 (L) 07/16/2024    LYMPH 18.0 07/16/2024    MONO 0.6 07/16/2024    MONO 12.6 07/16/2024    EOS 0.2 07/16/2024    BASO 0.04 07/16/2024    EOSINOPHIL 3.5 07/16/2024    BASOPHIL 0.9 07/16/2024      Lab Results   Component Value Date     07/16/2024    K 3.9 07/16/2024     07/16/2024    CO2 29 07/16/2024    BUN 13 07/16/2024    CREATININE 1.2 07/16/2024    CALCIUM 9.2 07/16/2024    ANIONGAP 10 07/16/2024    ESTGFRAFRICA >60.0 06/28/2022    EGFRNONAA >60.0 06/28/2022     Lab Results   Component Value Date    ALT 11 07/16/2024    AST 17 07/16/2024     (H) 07/28/2008    ALKPHOS 60 07/16/2024    BILITOT 0.4 07/16/2024     Lab Results   Component Value Date    IRON 26 (L) 07/15/2024    TRANSFERRIN 301 07/15/2024    TIBC 445 07/15/2024    FESATURATED 6 (L) 07/15/2024    FERRITIN 17 (L) 07/15/2024        Plan:   Lower GI bleed  -     Ambulatory referral/consult to Endo Procedure ; Future; Expected date: 07/19/2024  -     CBC Auto Differential; Future; Expected date: 07/18/2024  -     Comprehensive Metabolic Panel; Future; Expected date: 07/18/2024  -     Ferritin; Future; Expected date: 07/18/2024  -     Iron and TIBC; Future; Expected date: 07/18/2024    Other cirrhosis of liver  -     Ambulatory referral/consult to Endo Procedure ; Future; Expected date: 07/19/2024  -     CBC Auto  Differential; Future; Expected date: 07/18/2024  -     Comprehensive Metabolic Panel; Future; Expected date: 07/18/2024    Gastroesophageal reflux disease, unspecified whether esophagitis present  -     CBC Auto Differential; Future; Expected date: 07/18/2024  -     Comprehensive Metabolic Panel; Future; Expected date: 07/18/2024    HCC (hepatocellular carcinoma)  -     CBC Auto Differential; Future; Expected date: 07/18/2024  -     Comprehensive Metabolic Panel; Future; Expected date: 07/18/2024    Dark stools  -     CBC Auto Differential; Future; Expected date: 07/18/2024  -     Comprehensive Metabolic Panel; Future; Expected date: 07/18/2024  -     Ferritin; Future; Expected date: 07/18/2024  -     Iron and TIBC; Future; Expected date: 07/18/2024    Other orders  -     methylPREDNISolone sodium succinate injection 60 mg  -     EPINEPHrine (EPIPEN) 0.3 mg/0.3 mL pen injection 0.3 mg  -     diphenhydrAMINE injection 50 mg  -     hydrocortisone sodium succinate injection 100 mg        Med Onc Chart Routing      Follow up with physician    Follow up with VASILIY . F/u 6 weeks after the last dose of IV iron with labs prior in person at [place]   Infusion scheduling note   schedule for Feraheme infusions x 2 at first available   Injection scheduling note n/a   Labs   Scheduling:  Preferred lab: [facility]  Lab interval:  cbc, cmp,iron studies prior to visit   Imaging   N/a   Pharmacy appointment No pharmacy appointment needed      Other referrals       No additional referrals needed  n/a          Eval with colonoscopy due to h/o lower GI bleed. Needs f/u with GI due to previous ulcerations found in colon and recurrent SEBASTIAN.  Will repleat with IV iron therapy.  Recommend concentrating on eating a diet high in iron content.      Total time spent on encounter: 45 minutes    Collaborating Provider:  Dr. Antonio Pineda    Thank You,  STEVIE HarrisP-C  Benign Hematology

## 2024-07-18 NOTE — PROGRESS NOTES
Patient ID: Erendira Pretty is a 79 y.o. male.    Chief Complaint: Hospital Follow Up    History of Present Illness    CHIEF COMPLAINT:  Patient presents today for follow up.    SHORTNESS OF BREATH:  Patient reports experiencing shortness of breath prior to recent hospitalization, which he believes may have been due to anemia. He received iron infusions during the hospitalization, which improved his symptoms. Before treatment, his legs would hurt when walking to the mailbox. His hemoglobin had dropped to 9 and ferritin was low at 17. After receiving two small vials of iron, he felt improvement the next morning and was able to walk up and down the kevin without leg pain or shortness of breath. He is currently scheduled for more iron infusions.    CARDIAC PROCEDURES:  Patient underwent an ablation procedure on the 24th and is scheduled to have a Watchman device implanted on the 3rd. He expresses amazement at the advancements in medical technology related to these procedures.    GI ISSUES:  Patient has a GI follow-up scheduled in October. They are working on setting up a colonoscopy to check for any further ulcerations.    MEDICATION CHANGES:  Patient reports some confusion about medication changes made during hospital discharge. He clarifies that he is to take finasteride at night and Jardiance in the morning. His acyclovir prescription has run out and needs a refill. He is taking an unspecified medication at 40mg twice daily for 3 months, which will now be decreased to 20mg twice daily. He also takes famotidine (Pepcid) in addition to his other medications.    PMH, PSH, SH, FH reviewed with patient.    ROS:  General: -fever, -chills, -fatigue, -weight gain, -weight loss  Eyes: -vision changes, -redness, -discharge  ENT: -ear pain, -nasal congestion, -sore throat  Cardiovascular: -chest pain, -palpitations, -lower extremity edema  Respiratory: -cough, +shortness of breath  Gastrointestinal: -abdominal pain, -nausea,  -vomiting, -diarrhea, -constipation, -blood in stool  Genitourinary: -dysuria, -hematuria, -frequency  Musculoskeletal: -joint pain, -muscle pain  Skin: -rash, -lesion  Neurological: -headache, -dizziness, -numbness, -tingling  Psychiatric: -anxiety, -depression, -sleep difficulty, +confusion         Exam:  Physical Exam    General: No acute distress. Well-developed. Well-nourished.  Eyes: EOMI. Sclerae anicteric.  HENT: Normocephalic. Atraumatic. Nares patent. Moist oral mucosa. Trace edema to the mid chin.  Cardiovascular: Regular rate. Regular rhythm. No murmurs. No rubs. No gallops. Normal S1, S2.  Respiratory: Normal respiratory effort. Clear to auscultation bilaterally. No rales. No rhonchi. No wheezing.  Abdomen: Soft. Non-tender. Non-distended. Normoactive bowel sounds.  Musculoskeletal: No  obvious deformity.  Extremities: No lower extremity edema.  Neurological: Alert & oriented x3. No slurred speech. Normal gait.  Psychiatric: Normal mood. Normal affect. Good insight. Good judgment.  Skin: Warm. Dry. No rash.         Assessment/Plan:  Iron deficiency anemia due to chronic blood loss    Gastroesophageal reflux disease without esophagitis  -     RABEprazole (ACIPHEX) 20 mg tablet; Take 1 tablet (20 mg total) by mouth 2 (two) times daily.  Dispense: 180 tablet; Refill: 3         Assessment & Plan    N40.1 Benign prostatic hyperplasia with lower urinary tract symptoms  R60.0 Localized edema  Z94.2 Lung transplant status  Z43.3 Encounter for attention to colostomy  R06.02 Shortness of breath  K25.9 Gastric ulcer, unspecified as acute or chronic, without hemorrhage or perforation  Z95.810 Presence of automatic (implantable) cardiac defibrillator  D50.9 Iron deficiency anemia, unspecified  R22.1 Localized swelling, mass and lump, neck  E11.9 Type 2 diabetes mellitus without complications  K21.9 Gastro-esophageal reflux disease without esophagitis  ANEMIA:  - Patient recently hospitalized and treated for anemia  with IV iron infusions, which improved symptoms of shortness of breath and leg pain.  - Ferritin level was low at 17.  - Will continue iron supplementation as scheduled by rheumatologist.  CARDIAC ISSUES:  - Recent cardiac ablation procedure on 8/24.  - Scheduled for Watchman device placement on 9/3.  MEDICATIONS/SUPPLEMENTS:  - Continued finasteride at current dose.  - Take at night before bed.  - Continued Jardiance at current dose.  - Take in the morning.  - Continued famotidine (Pepcid) at current dose.  - Acyclovir 800 mg.  - Dose changed from 40 mg twice daily to 20 mg twice daily.  - Prescribed 180 tablets for 90 day supply with refills.  GASTROINTESTINAL ISSUES:  - Referral placed for colonoscopy to evaluate for recurrent ulcerations.  - Follow up with GI scheduled in October.  FOLLOW UP:  - Follow up with Miss Paulino scheduled in October.     Transitional Care Note    Family and/or Caretaker present at visit?  No.  Diagnostic tests reviewed/disposition: I have reviewed all completed as well as pending diagnostic tests at the time of discharge.  Disease/illness education: given  Home health/community services discussion/referrals: Patient does not have home health established from hospital visit.  They do not need home health.  If needed, we will set up home health for the patient.   Establishment or re-establishment of referral orders for community resources: No other necessary community resources.   Discussion with other health care providers: No discussion with other health care providers necessary.               Visit today included increased complexity associated with the care of the episodic problem  addressed and managing the longitudinal care of the patient due to the serious and/or complex managed problem(s) .      No follow-ups on file.    This note was generated with the assistance of ambient listening technology. Verbal consent was obtained by the patient and accompanying visitor(s) for the  recording of patient appointment to facilitate this note. I attest to having reviewed and edited the generated note for accuracy, though some syntax or spelling errors may persist. Please contact the author of this note for any clarification.

## 2024-07-19 ENCOUNTER — PATIENT OUTREACH (OUTPATIENT)
Dept: ADMINISTRATIVE | Facility: CLINIC | Age: 79
End: 2024-07-19
Payer: MEDICARE

## 2024-07-19 DIAGNOSIS — I48.92 ATRIAL FLUTTER, UNSPECIFIED TYPE: ICD-10-CM

## 2024-07-19 DIAGNOSIS — I49.9 CARDIAC ARRHYTHMIA, UNSPECIFIED CARDIAC ARRHYTHMIA TYPE: ICD-10-CM

## 2024-07-19 DIAGNOSIS — Z01.818 PRE-OP TESTING: Primary | ICD-10-CM

## 2024-07-19 LAB
OHS QRS DURATION: 86 MS
OHS QTC CALCULATION: 435 MS

## 2024-07-19 NOTE — PROGRESS NOTES
C3 nurse spoke with Erendira Pretty for a TCC post hospital discharge follow up call. The patient completed a HOSFU appointment with Bhupinder Callaway MD (PCP) on 7/18/24. He also completed a follow up with Frank Gallardo MD (cardiology) on 7/17/24, and a new patient appointment with Elaine Bustillos NP ([facility]) on 7/18/24. He is scheduled for iron infusion on 7/23/24, and will have the Watchman Procedure done on 8/2/24.

## 2024-07-22 ENCOUNTER — PATIENT MESSAGE (OUTPATIENT)
Dept: PREADMISSION TESTING | Facility: HOSPITAL | Age: 79
End: 2024-07-22

## 2024-07-22 ENCOUNTER — HOSPITAL ENCOUNTER (OUTPATIENT)
Dept: PREADMISSION TESTING | Facility: HOSPITAL | Age: 79
Discharge: HOME | End: 2024-07-22
Attending: COLON & RECTAL SURGERY
Payer: MEDICARE

## 2024-07-22 DIAGNOSIS — K74.69 OTHER CIRRHOSIS OF LIVER: ICD-10-CM

## 2024-07-22 DIAGNOSIS — K92.2 LOWER GI BLEED: ICD-10-CM

## 2024-07-23 ENCOUNTER — INFUSION (OUTPATIENT)
Dept: INFUSION THERAPY | Facility: HOSPITAL | Age: 79
End: 2024-07-23
Attending: NURSE PRACTITIONER
Payer: MEDICARE

## 2024-07-23 VITALS
OXYGEN SATURATION: 98 % | HEART RATE: 60 BPM | BODY MASS INDEX: 35.64 KG/M2 | RESPIRATION RATE: 98 BRPM | DIASTOLIC BLOOD PRESSURE: 56 MMHG | SYSTOLIC BLOOD PRESSURE: 98 MMHG | WEIGHT: 227.06 LBS | HEIGHT: 67 IN | TEMPERATURE: 98 F

## 2024-07-23 DIAGNOSIS — D50.0 IRON DEFICIENCY ANEMIA DUE TO CHRONIC BLOOD LOSS: Primary | ICD-10-CM

## 2024-07-23 PROCEDURE — 63600175 PHARM REV CODE 636 W HCPCS: Mod: JZ,JG | Performed by: NURSE PRACTITIONER

## 2024-07-23 PROCEDURE — 25000003 PHARM REV CODE 250: Performed by: NURSE PRACTITIONER

## 2024-07-23 PROCEDURE — 96374 THER/PROPH/DIAG INJ IV PUSH: CPT

## 2024-07-23 PROCEDURE — 96375 TX/PRO/DX INJ NEW DRUG ADDON: CPT

## 2024-07-23 RX ORDER — METHYLPREDNISOLONE SOD SUCC 125 MG
60 VIAL (EA) INJECTION
Status: COMPLETED | OUTPATIENT
Start: 2024-07-23 | End: 2024-07-23

## 2024-07-23 RX ORDER — EPINEPHRINE 0.3 MG/.3ML
0.3 INJECTION SUBCUTANEOUS ONCE AS NEEDED
OUTPATIENT
Start: 2024-07-30

## 2024-07-23 RX ORDER — METHYLPREDNISOLONE SOD SUCC 125 MG
60 VIAL (EA) INJECTION
Start: 2024-07-30

## 2024-07-23 RX ORDER — DIPHENHYDRAMINE HYDROCHLORIDE 50 MG/ML
50 INJECTION INTRAMUSCULAR; INTRAVENOUS ONCE AS NEEDED
OUTPATIENT
Start: 2024-07-30

## 2024-07-23 RX ADMIN — METHYLPREDNISOLONE SODIUM SUCCINATE 60 MG: 125 INJECTION, POWDER, FOR SOLUTION INTRAMUSCULAR; INTRAVENOUS at 07:07

## 2024-07-23 RX ADMIN — FERUMOXYTOL 510 MG: 510 INJECTION INTRAVENOUS at 07:07

## 2024-07-23 NOTE — NURSING
Administered  Feraheme 510 mg IV per MD order.  Patient tolerated medication without complication.  Discharged with future appointments.

## 2024-07-24 ENCOUNTER — HOSPITAL ENCOUNTER (INPATIENT)
Facility: HOSPITAL | Age: 79
LOS: 2 days | Discharge: HOME | DRG: 378 | End: 2024-07-27
Attending: EMERGENCY MEDICINE | Admitting: HOSPITALIST
Payer: MEDICARE

## 2024-07-24 ENCOUNTER — PATIENT MESSAGE (OUTPATIENT)
Dept: HEPATOLOGY | Facility: CLINIC | Age: 79
End: 2024-07-24
Payer: MEDICARE

## 2024-07-24 DIAGNOSIS — K21.9 GASTROESOPHAGEAL REFLUX DISEASE WITHOUT ESOPHAGITIS: ICD-10-CM

## 2024-07-24 DIAGNOSIS — D64.9 SYMPTOMATIC ANEMIA: ICD-10-CM

## 2024-07-24 DIAGNOSIS — R07.9 CHEST PAIN: ICD-10-CM

## 2024-07-24 DIAGNOSIS — N17.9 AKI (ACUTE KIDNEY INJURY): ICD-10-CM

## 2024-07-24 DIAGNOSIS — K92.1 MELENA: Primary | ICD-10-CM

## 2024-07-24 LAB
ABO + RH BLD: NORMAL
ALBUMIN SERPL BCP-MCNC: 3.7 G/DL (ref 3.5–5.2)
ALP SERPL-CCNC: 63 U/L (ref 55–135)
ALT SERPL W/O P-5'-P-CCNC: 14 U/L (ref 10–44)
ANION GAP SERPL CALC-SCNC: 9 MMOL/L (ref 8–16)
APTT PPP: 33.3 SEC (ref 21–32)
AST SERPL-CCNC: 16 U/L (ref 10–40)
BASOPHILS # BLD AUTO: 0.02 K/UL (ref 0–0.2)
BASOPHILS NFR BLD: 0.2 % (ref 0–1.9)
BILIRUB SERPL-MCNC: 0.2 MG/DL (ref 0.1–1)
BLD GP AB SCN CELLS X3 SERPL QL: NORMAL
BNP SERPL-MCNC: 334 PG/ML (ref 0–99)
BUN SERPL-MCNC: 28 MG/DL (ref 8–23)
CALCIUM SERPL-MCNC: 9.2 MG/DL (ref 8.7–10.5)
CHLORIDE SERPL-SCNC: 104 MMOL/L (ref 95–110)
CO2 SERPL-SCNC: 24 MMOL/L (ref 23–29)
CREAT SERPL-MCNC: 1.5 MG/DL (ref 0.5–1.4)
DIFFERENTIAL METHOD BLD: ABNORMAL
EOSINOPHIL # BLD AUTO: 0 K/UL (ref 0–0.5)
EOSINOPHIL NFR BLD: 0.5 % (ref 0–8)
ERYTHROCYTE [DISTWIDTH] IN BLOOD BY AUTOMATED COUNT: 22.3 % (ref 11.5–14.5)
EST. GFR  (NO RACE VARIABLE): 47 ML/MIN/1.73 M^2
GLUCOSE SERPL-MCNC: 132 MG/DL (ref 70–110)
HCT VFR BLD AUTO: 24.7 % (ref 40–54)
HGB BLD-MCNC: 7.5 G/DL (ref 14–18)
IMM GRANULOCYTES # BLD AUTO: 0.08 K/UL (ref 0–0.04)
IMM GRANULOCYTES NFR BLD AUTO: 1 % (ref 0–0.5)
INR PPP: 1.4 (ref 0.8–1.2)
LYMPHOCYTES # BLD AUTO: 1.5 K/UL (ref 1–4.8)
LYMPHOCYTES NFR BLD: 19 % (ref 18–48)
MCH RBC QN AUTO: 27.2 PG (ref 27–31)
MCHC RBC AUTO-ENTMCNC: 30.4 G/DL (ref 32–36)
MCV RBC AUTO: 90 FL (ref 82–98)
MONOCYTES # BLD AUTO: 0.7 K/UL (ref 0.3–1)
MONOCYTES NFR BLD: 8.1 % (ref 4–15)
NEUTROPHILS # BLD AUTO: 5.8 K/UL (ref 1.8–7.7)
NEUTROPHILS NFR BLD: 71.2 % (ref 38–73)
NRBC BLD-RTO: 1 /100 WBC
OB PNL STL: POSITIVE
PLATELET # BLD AUTO: 189 K/UL (ref 150–450)
PMV BLD AUTO: 10.1 FL (ref 9.2–12.9)
POTASSIUM SERPL-SCNC: 3.6 MMOL/L (ref 3.5–5.1)
PROT SERPL-MCNC: 6.9 G/DL (ref 6–8.4)
PROTHROMBIN TIME: 14.6 SEC (ref 9–12.5)
RBC # BLD AUTO: 2.76 M/UL (ref 4.6–6.2)
SODIUM SERPL-SCNC: 137 MMOL/L (ref 136–145)
SPECIMEN OUTDATE: NORMAL
TROPONIN I SERPL DL<=0.01 NG/ML-MCNC: 0.02 NG/ML (ref 0–0.03)
WBC # BLD AUTO: 8.1 K/UL (ref 3.9–12.7)

## 2024-07-24 PROCEDURE — 36430 TRANSFUSION BLD/BLD COMPNT: CPT

## 2024-07-24 PROCEDURE — 84484 ASSAY OF TROPONIN QUANT: CPT | Performed by: NURSE PRACTITIONER

## 2024-07-24 PROCEDURE — 86900 BLOOD TYPING SEROLOGIC ABO: CPT | Performed by: NURSE PRACTITIONER

## 2024-07-24 PROCEDURE — 93010 ELECTROCARDIOGRAM REPORT: CPT | Mod: ,,, | Performed by: INTERNAL MEDICINE

## 2024-07-24 PROCEDURE — 85610 PROTHROMBIN TIME: CPT | Performed by: NURSE PRACTITIONER

## 2024-07-24 PROCEDURE — 82272 OCCULT BLD FECES 1-3 TESTS: CPT | Performed by: EMERGENCY MEDICINE

## 2024-07-24 PROCEDURE — 99285 EMERGENCY DEPT VISIT HI MDM: CPT | Mod: 25

## 2024-07-24 PROCEDURE — 63600175 PHARM REV CODE 636 W HCPCS: Performed by: EMERGENCY MEDICINE

## 2024-07-24 PROCEDURE — 86901 BLOOD TYPING SEROLOGIC RH(D): CPT | Performed by: NURSE PRACTITIONER

## 2024-07-24 PROCEDURE — 80053 COMPREHEN METABOLIC PANEL: CPT | Performed by: NURSE PRACTITIONER

## 2024-07-24 PROCEDURE — 85730 THROMBOPLASTIN TIME PARTIAL: CPT | Performed by: NURSE PRACTITIONER

## 2024-07-24 PROCEDURE — 93005 ELECTROCARDIOGRAM TRACING: CPT

## 2024-07-24 PROCEDURE — 85025 COMPLETE CBC W/AUTO DIFF WBC: CPT | Performed by: NURSE PRACTITIONER

## 2024-07-24 PROCEDURE — 83880 ASSAY OF NATRIURETIC PEPTIDE: CPT | Performed by: NURSE PRACTITIONER

## 2024-07-24 PROCEDURE — 86920 COMPATIBILITY TEST SPIN: CPT | Performed by: EMERGENCY MEDICINE

## 2024-07-24 PROCEDURE — 30233N1 TRANSFUSION OF NONAUTOLOGOUS RED BLOOD CELLS INTO PERIPHERAL VEIN, PERCUTANEOUS APPROACH: ICD-10-PCS | Performed by: EMERGENCY MEDICINE

## 2024-07-24 PROCEDURE — 96374 THER/PROPH/DIAG INJ IV PUSH: CPT

## 2024-07-24 PROCEDURE — 83036 HEMOGLOBIN GLYCOSYLATED A1C: CPT | Performed by: NURSE PRACTITIONER

## 2024-07-24 RX ORDER — PANTOPRAZOLE SODIUM 40 MG/10ML
40 INJECTION, POWDER, LYOPHILIZED, FOR SOLUTION INTRAVENOUS
Status: COMPLETED | OUTPATIENT
Start: 2024-07-24 | End: 2024-07-24

## 2024-07-24 RX ORDER — HYDROCODONE BITARTRATE AND ACETAMINOPHEN 500; 5 MG/1; MG/1
TABLET ORAL
Status: DISCONTINUED | OUTPATIENT
Start: 2024-07-25 | End: 2024-07-27

## 2024-07-24 RX ADMIN — PANTOPRAZOLE SODIUM 40 MG: 40 INJECTION, POWDER, FOR SOLUTION INTRAVENOUS at 11:07

## 2024-07-25 PROBLEM — D64.9 SYMPTOMATIC ANEMIA: Status: ACTIVE | Noted: 2024-07-25

## 2024-07-25 PROBLEM — N17.9 AKI (ACUTE KIDNEY INJURY): Status: ACTIVE | Noted: 2024-07-25

## 2024-07-25 LAB
ANION GAP SERPL CALC-SCNC: 10 MMOL/L (ref 8–16)
BASOPHILS # BLD AUTO: 0.03 K/UL (ref 0–0.2)
BASOPHILS # BLD AUTO: 0.03 K/UL (ref 0–0.2)
BASOPHILS NFR BLD: 0.5 % (ref 0–1.9)
BASOPHILS NFR BLD: 0.6 % (ref 0–1.9)
BLD PROD TYP BPU: NORMAL
BLOOD UNIT EXPIRATION DATE: NORMAL
BLOOD UNIT TYPE CODE: 5100
BLOOD UNIT TYPE: NORMAL
BUN SERPL-MCNC: 26 MG/DL (ref 8–23)
CALCIUM SERPL-MCNC: 8.8 MG/DL (ref 8.7–10.5)
CHLORIDE SERPL-SCNC: 106 MMOL/L (ref 95–110)
CO2 SERPL-SCNC: 25 MMOL/L (ref 23–29)
CODING SYSTEM: NORMAL
CREAT SERPL-MCNC: 1.2 MG/DL (ref 0.5–1.4)
CROSSMATCH INTERPRETATION: NORMAL
DIFFERENTIAL METHOD BLD: ABNORMAL
DIFFERENTIAL METHOD BLD: ABNORMAL
DISPENSE STATUS: NORMAL
EOSINOPHIL # BLD AUTO: 0.1 K/UL (ref 0–0.5)
EOSINOPHIL # BLD AUTO: 0.1 K/UL (ref 0–0.5)
EOSINOPHIL NFR BLD: 0.9 % (ref 0–8)
EOSINOPHIL NFR BLD: 1.8 % (ref 0–8)
ERYTHROCYTE [DISTWIDTH] IN BLOOD BY AUTOMATED COUNT: 21 % (ref 11.5–14.5)
ERYTHROCYTE [DISTWIDTH] IN BLOOD BY AUTOMATED COUNT: 21.8 % (ref 11.5–14.5)
EST. GFR  (NO RACE VARIABLE): >60 ML/MIN/1.73 M^2
ESTIMATED AVG GLUCOSE: 103 MG/DL (ref 68–131)
GLUCOSE SERPL-MCNC: 88 MG/DL (ref 70–110)
HBA1C MFR BLD: 5.2 % (ref 4–5.6)
HCT VFR BLD AUTO: 28.1 % (ref 40–54)
HCT VFR BLD AUTO: 28.7 % (ref 40–54)
HGB BLD-MCNC: 8.6 G/DL (ref 14–18)
HGB BLD-MCNC: 8.8 G/DL (ref 14–18)
IMM GRANULOCYTES # BLD AUTO: 0.09 K/UL (ref 0–0.04)
IMM GRANULOCYTES # BLD AUTO: 0.12 K/UL (ref 0–0.04)
IMM GRANULOCYTES NFR BLD AUTO: 1.4 % (ref 0–0.5)
IMM GRANULOCYTES NFR BLD AUTO: 2.4 % (ref 0–0.5)
LYMPHOCYTES # BLD AUTO: 1.1 K/UL (ref 1–4.8)
LYMPHOCYTES # BLD AUTO: 1.5 K/UL (ref 1–4.8)
LYMPHOCYTES NFR BLD: 22.2 % (ref 18–48)
LYMPHOCYTES NFR BLD: 22.2 % (ref 18–48)
MCH RBC QN AUTO: 27.6 PG (ref 27–31)
MCH RBC QN AUTO: 27.9 PG (ref 27–31)
MCHC RBC AUTO-ENTMCNC: 30.6 G/DL (ref 32–36)
MCHC RBC AUTO-ENTMCNC: 30.7 G/DL (ref 32–36)
MCV RBC AUTO: 90 FL (ref 82–98)
MCV RBC AUTO: 91 FL (ref 82–98)
MONOCYTES # BLD AUTO: 0.6 K/UL (ref 0.3–1)
MONOCYTES # BLD AUTO: 0.7 K/UL (ref 0.3–1)
MONOCYTES NFR BLD: 10.2 % (ref 4–15)
MONOCYTES NFR BLD: 11.2 % (ref 4–15)
NEUTROPHILS # BLD AUTO: 3.2 K/UL (ref 1.8–7.7)
NEUTROPHILS # BLD AUTO: 4.3 K/UL (ref 1.8–7.7)
NEUTROPHILS NFR BLD: 61.8 % (ref 38–73)
NEUTROPHILS NFR BLD: 64.8 % (ref 38–73)
NRBC BLD-RTO: 1 /100 WBC
NRBC BLD-RTO: 1 /100 WBC
NUM UNITS TRANS PACKED RBC: NORMAL
PLATELET # BLD AUTO: 165 K/UL (ref 150–450)
PLATELET # BLD AUTO: 171 K/UL (ref 150–450)
PMV BLD AUTO: 10.3 FL (ref 9.2–12.9)
PMV BLD AUTO: 9.4 FL (ref 9.2–12.9)
POCT GLUCOSE: 101 MG/DL (ref 70–110)
POCT GLUCOSE: 107 MG/DL (ref 70–110)
POCT GLUCOSE: 122 MG/DL (ref 70–110)
POCT GLUCOSE: 147 MG/DL (ref 70–110)
POTASSIUM SERPL-SCNC: 4.1 MMOL/L (ref 3.5–5.1)
RBC # BLD AUTO: 3.12 M/UL (ref 4.6–6.2)
RBC # BLD AUTO: 3.15 M/UL (ref 4.6–6.2)
SODIUM SERPL-SCNC: 141 MMOL/L (ref 136–145)
WBC # BLD AUTO: 5.09 K/UL (ref 3.9–12.7)
WBC # BLD AUTO: 6.66 K/UL (ref 3.9–12.7)

## 2024-07-25 PROCEDURE — 85025 COMPLETE CBC W/AUTO DIFF WBC: CPT | Mod: 91 | Performed by: INTERNAL MEDICINE

## 2024-07-25 PROCEDURE — 25000003 PHARM REV CODE 250: Performed by: NURSE PRACTITIONER

## 2024-07-25 PROCEDURE — 25000003 PHARM REV CODE 250: Performed by: PHYSICIAN ASSISTANT

## 2024-07-25 PROCEDURE — 21400001 HC TELEMETRY ROOM

## 2024-07-25 PROCEDURE — 85025 COMPLETE CBC W/AUTO DIFF WBC: CPT | Performed by: NURSE PRACTITIONER

## 2024-07-25 PROCEDURE — 99223 1ST HOSP IP/OBS HIGH 75: CPT | Mod: ,,, | Performed by: PHYSICIAN ASSISTANT

## 2024-07-25 PROCEDURE — 36415 COLL VENOUS BLD VENIPUNCTURE: CPT | Mod: XB | Performed by: INTERNAL MEDICINE

## 2024-07-25 PROCEDURE — P9016 RBC LEUKOCYTES REDUCED: HCPCS | Performed by: EMERGENCY MEDICINE

## 2024-07-25 PROCEDURE — 63600175 PHARM REV CODE 636 W HCPCS: Performed by: PHYSICIAN ASSISTANT

## 2024-07-25 PROCEDURE — 63600175 PHARM REV CODE 636 W HCPCS: Performed by: NURSE PRACTITIONER

## 2024-07-25 PROCEDURE — 36415 COLL VENOUS BLD VENIPUNCTURE: CPT | Performed by: NURSE PRACTITIONER

## 2024-07-25 PROCEDURE — 80048 BASIC METABOLIC PNL TOTAL CA: CPT | Performed by: NURSE PRACTITIONER

## 2024-07-25 RX ORDER — SODIUM CHLORIDE 0.9 % (FLUSH) 0.9 %
3 SYRINGE (ML) INJECTION EVERY 12 HOURS PRN
Status: DISCONTINUED | OUTPATIENT
Start: 2024-07-25 | End: 2024-07-27 | Stop reason: HOSPADM

## 2024-07-25 RX ORDER — INSULIN GLARGINE 100 [IU]/ML
22 INJECTION, SOLUTION SUBCUTANEOUS NIGHTLY
Status: DISCONTINUED | OUTPATIENT
Start: 2024-07-25 | End: 2024-07-27 | Stop reason: HOSPADM

## 2024-07-25 RX ORDER — SIMETHICONE 80 MG
1 TABLET,CHEWABLE ORAL 4 TIMES DAILY PRN
Status: DISCONTINUED | OUTPATIENT
Start: 2024-07-25 | End: 2024-07-27 | Stop reason: HOSPADM

## 2024-07-25 RX ORDER — PANTOPRAZOLE SODIUM 40 MG/10ML
40 INJECTION, POWDER, LYOPHILIZED, FOR SOLUTION INTRAVENOUS 2 TIMES DAILY
Status: DISCONTINUED | OUTPATIENT
Start: 2024-07-25 | End: 2024-07-27

## 2024-07-25 RX ORDER — ACETAMINOPHEN 650 MG/1
650 SUPPOSITORY RECTAL EVERY 4 HOURS PRN
Status: DISCONTINUED | OUTPATIENT
Start: 2024-07-25 | End: 2024-07-27 | Stop reason: HOSPADM

## 2024-07-25 RX ORDER — POLYETHYLENE GLYCOL 3350, SODIUM SULFATE ANHYDROUS, SODIUM BICARBONATE, SODIUM CHLORIDE, POTASSIUM CHLORIDE 236; 22.74; 6.74; 5.86; 2.97 G/4L; G/4L; G/4L; G/4L; G/4L
4000 POWDER, FOR SOLUTION ORAL ONCE
Status: DISCONTINUED | OUTPATIENT
Start: 2024-07-26 | End: 2024-07-25

## 2024-07-25 RX ORDER — ACETAMINOPHEN 325 MG/1
650 TABLET ORAL EVERY 8 HOURS PRN
Status: DISCONTINUED | OUTPATIENT
Start: 2024-07-25 | End: 2024-07-27 | Stop reason: HOSPADM

## 2024-07-25 RX ORDER — GLUCAGON 1 MG
1 KIT INJECTION
Status: DISCONTINUED | OUTPATIENT
Start: 2024-07-25 | End: 2024-07-27 | Stop reason: HOSPADM

## 2024-07-25 RX ORDER — ONDANSETRON HYDROCHLORIDE 2 MG/ML
4 INJECTION, SOLUTION INTRAVENOUS EVERY 8 HOURS PRN
Status: DISCONTINUED | OUTPATIENT
Start: 2024-07-25 | End: 2024-07-27 | Stop reason: HOSPADM

## 2024-07-25 RX ORDER — POLYETHYLENE GLYCOL 3350, SODIUM SULFATE ANHYDROUS, SODIUM BICARBONATE, SODIUM CHLORIDE, POTASSIUM CHLORIDE 236; 22.74; 6.74; 5.86; 2.97 G/4L; G/4L; G/4L; G/4L; G/4L
4000 POWDER, FOR SOLUTION ORAL ONCE
Status: COMPLETED | OUTPATIENT
Start: 2024-07-26 | End: 2024-07-26

## 2024-07-25 RX ORDER — PANTOPRAZOLE SODIUM 40 MG/10ML
40 INJECTION, POWDER, LYOPHILIZED, FOR SOLUTION INTRAVENOUS DAILY
Status: DISCONTINUED | OUTPATIENT
Start: 2024-07-25 | End: 2024-07-25

## 2024-07-25 RX ORDER — NALOXONE HCL 0.4 MG/ML
0.02 VIAL (ML) INJECTION
Status: DISCONTINUED | OUTPATIENT
Start: 2024-07-25 | End: 2024-07-27 | Stop reason: HOSPADM

## 2024-07-25 RX ORDER — PROMETHAZINE HYDROCHLORIDE 25 MG/1
25 TABLET ORAL EVERY 6 HOURS PRN
Status: DISCONTINUED | OUTPATIENT
Start: 2024-07-25 | End: 2024-07-27 | Stop reason: HOSPADM

## 2024-07-25 RX ORDER — INSULIN ASPART 100 [IU]/ML
0-5 INJECTION, SOLUTION INTRAVENOUS; SUBCUTANEOUS EVERY 6 HOURS PRN
Status: DISCONTINUED | OUTPATIENT
Start: 2024-07-25 | End: 2024-07-27 | Stop reason: HOSPADM

## 2024-07-25 RX ORDER — FINASTERIDE 5 MG/1
5 TABLET, FILM COATED ORAL DAILY
Status: DISCONTINUED | OUTPATIENT
Start: 2024-07-25 | End: 2024-07-27 | Stop reason: HOSPADM

## 2024-07-25 RX ORDER — ALUMINUM HYDROXIDE, MAGNESIUM HYDROXIDE, AND SIMETHICONE 1200; 120; 1200 MG/30ML; MG/30ML; MG/30ML
30 SUSPENSION ORAL 4 TIMES DAILY PRN
Status: DISCONTINUED | OUTPATIENT
Start: 2024-07-25 | End: 2024-07-27 | Stop reason: HOSPADM

## 2024-07-25 RX ORDER — PROPAFENONE HYDROCHLORIDE 150 MG/1
150 TABLET, COATED ORAL EVERY 8 HOURS
Status: DISCONTINUED | OUTPATIENT
Start: 2024-07-25 | End: 2024-07-27 | Stop reason: HOSPADM

## 2024-07-25 RX ORDER — HYDROCODONE BITARTRATE AND ACETAMINOPHEN 5; 325 MG/1; MG/1
1 TABLET ORAL EVERY 6 HOURS PRN
Status: DISCONTINUED | OUTPATIENT
Start: 2024-07-25 | End: 2024-07-27 | Stop reason: HOSPADM

## 2024-07-25 RX ORDER — OXYBUTYNIN CHLORIDE 5 MG/1
5 TABLET, EXTENDED RELEASE ORAL DAILY
Status: DISCONTINUED | OUTPATIENT
Start: 2024-07-25 | End: 2024-07-27 | Stop reason: HOSPADM

## 2024-07-25 RX ORDER — TALC
6 POWDER (GRAM) TOPICAL NIGHTLY PRN
Status: DISCONTINUED | OUTPATIENT
Start: 2024-07-25 | End: 2024-07-27 | Stop reason: HOSPADM

## 2024-07-25 RX ORDER — MORPHINE SULFATE 4 MG/ML
2 INJECTION, SOLUTION INTRAMUSCULAR; INTRAVENOUS EVERY 4 HOURS PRN
Status: DISCONTINUED | OUTPATIENT
Start: 2024-07-25 | End: 2024-07-27 | Stop reason: HOSPADM

## 2024-07-25 RX ORDER — POLYETHYLENE GLYCOL 3350 17 G/17G
17 POWDER, FOR SOLUTION ORAL DAILY PRN
Status: DISCONTINUED | OUTPATIENT
Start: 2024-07-25 | End: 2024-07-27

## 2024-07-25 RX ORDER — IBUPROFEN 200 MG
16 TABLET ORAL
Status: DISCONTINUED | OUTPATIENT
Start: 2024-07-25 | End: 2024-07-27 | Stop reason: HOSPADM

## 2024-07-25 RX ORDER — SYRING-NEEDL,DISP,INSUL,0.3 ML 29 G X1/2"
296 SYRINGE, EMPTY DISPOSABLE MISCELLANEOUS ONCE
Status: COMPLETED | OUTPATIENT
Start: 2024-07-25 | End: 2024-07-25

## 2024-07-25 RX ORDER — CARVEDILOL 12.5 MG/1
25 TABLET ORAL 2 TIMES DAILY WITH MEALS
Status: DISCONTINUED | OUTPATIENT
Start: 2024-07-25 | End: 2024-07-27 | Stop reason: HOSPADM

## 2024-07-25 RX ORDER — IBUPROFEN 200 MG
24 TABLET ORAL
Status: DISCONTINUED | OUTPATIENT
Start: 2024-07-25 | End: 2024-07-27 | Stop reason: HOSPADM

## 2024-07-25 RX ORDER — LEVOTHYROXINE SODIUM 150 UG/1
300 TABLET ORAL
Status: DISCONTINUED | OUTPATIENT
Start: 2024-07-25 | End: 2024-07-27 | Stop reason: HOSPADM

## 2024-07-25 RX ORDER — RANOLAZINE 500 MG/1
1000 TABLET, EXTENDED RELEASE ORAL 2 TIMES DAILY
Status: DISCONTINUED | OUTPATIENT
Start: 2024-07-25 | End: 2024-07-27 | Stop reason: HOSPADM

## 2024-07-25 RX ORDER — LISINOPRIL 10 MG/1
10 TABLET ORAL DAILY
Status: DISCONTINUED | OUTPATIENT
Start: 2024-07-25 | End: 2024-07-27 | Stop reason: HOSPADM

## 2024-07-25 RX ADMIN — RANOLAZINE 1000 MG: 500 TABLET, EXTENDED RELEASE ORAL at 10:07

## 2024-07-25 RX ADMIN — CARVEDILOL 25 MG: 12.5 TABLET, FILM COATED ORAL at 05:07

## 2024-07-25 RX ADMIN — LISINOPRIL 10 MG: 10 TABLET ORAL at 08:07

## 2024-07-25 RX ADMIN — PROPAFENONE HYDROCHLORIDE 150 MG: 150 TABLET, FILM COATED ORAL at 08:07

## 2024-07-25 RX ADMIN — FINASTERIDE 5 MG: 5 TABLET, FILM COATED ORAL at 08:07

## 2024-07-25 RX ADMIN — PANTOPRAZOLE SODIUM 40 MG: 40 INJECTION, POWDER, FOR SOLUTION INTRAVENOUS at 10:07

## 2024-07-25 RX ADMIN — LEVOTHYROXINE SODIUM 300 MCG: 150 TABLET ORAL at 07:07

## 2024-07-25 RX ADMIN — PROPAFENONE HYDROCHLORIDE 150 MG: 150 TABLET, FILM COATED ORAL at 02:07

## 2024-07-25 RX ADMIN — PROPAFENONE HYDROCHLORIDE 150 MG: 150 TABLET, FILM COATED ORAL at 10:07

## 2024-07-25 RX ADMIN — OXYBUTYNIN CHLORIDE 5 MG: 5 TABLET, EXTENDED RELEASE ORAL at 08:07

## 2024-07-25 RX ADMIN — PANTOPRAZOLE SODIUM 40 MG: 40 INJECTION, POWDER, FOR SOLUTION INTRAVENOUS at 11:07

## 2024-07-25 RX ADMIN — RANOLAZINE 1000 MG: 500 TABLET, EXTENDED RELEASE ORAL at 08:07

## 2024-07-25 RX ADMIN — MAGNESIUM CITRATE 296 ML: 1.75 LIQUID ORAL at 02:07

## 2024-07-25 RX ADMIN — CARVEDILOL 25 MG: 12.5 TABLET, FILM COATED ORAL at 08:07

## 2024-07-25 RX ADMIN — INSULIN GLARGINE 22 UNITS: 100 INJECTION, SOLUTION SUBCUTANEOUS at 10:07

## 2024-07-25 NOTE — HPI
Erendira Pretty is a 79 y.o. 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  Presented to the ER for persistent shortness of breath that worsened today which is exacerbated by exertion.  Patient reports he has been having symptoms for the last 6 months.  Patient was hospitalized numerous times for symptomatic anemia requiring blood transfusions and iron infusions.  Patient had pill camera, EGD, and colonoscopy performed without any identifiable source of active bleed.  Patient was taken aspirin, Plavix, and Xarelto, but has since been taken off of Plavix in his only currently taking aspirin and Xarelto.  Patient had an iron infusion performed on 07/23/2024.  Patient was evaluated by his cardiologist (Dr. Gallardo) on 07/17/2024.  Patient is scheduled to have Watchman procedure performed in the next couple of weeks.  Patient states he has also had associated black colored stools, but this has been ongoing as well.  Denies any passage of blood clots or bright red/dark red blood that feels toilet bowl.  Denies any use of home oxygen.  Denies any actual chest pain or palpitations.  Denies lightheadedness/dizziness/cough, cold, congestion, recent illnesses, sick contacts, or any other symptoms at this  time.    ER workup revealed H/H of 7.5/24.7 (previously 9.0/29.4 on 07/16/2024), BUN/creatinine 28/1.5 (previously 13/1.2 on 07/16/2024),  mg/dL, , troponin negative, occult blood stool positive, chest x-ray negative for acute findings, EKG revealed sinus rhythm with first-degree AV block with PACs with a ventricular rate of 72 beats per minute and a QT/QTC of 378/413.  Patient received 40 mg Protonix IV and has 1 unit of PRBCs ordered by ER provider.  Hospital Medicine consulted to admit patient for symptomatic anemia in setting of GI bleed and LESTER.  Patient in agreement with treatment plan.  Patient will be admitted under inpatient status.    PCP: Bhupinder Callaway

## 2024-07-25 NOTE — ASSESSMENT & PLAN NOTE
Patient with Paroxysmal (<7 days) atrial fibrillation which is controlled currently with Beta Blocker and Rythmol . Patient is currently in sinus rhythm.[id number] Score: 4. HASBLED Score: . Anticoagulation indicated. Anticoagulation done with ASA and Xarelto .  However, and holding ASA and Xarelto due to symptomatic anemia

## 2024-07-25 NOTE — ASSESSMENT & PLAN NOTE
Patient reports black stools. Noted to have a decrease in Hgb.   Will plan for EGD and colonoscopy after Xarelto held 48 hours.

## 2024-07-25 NOTE — SUBJECTIVE & OBJECTIVE
Past Medical History:   Diagnosis Date    Anticoagulant long-term use     Aortic stenosis     Asthma     Benign prostatic hyperplasia with nocturia     Bilateral carotid artery stenosis 07/21/2021    US CAROTID BILATERAL 07/14/2021 IMPRESSION: Atherosclerosis with suspected stenosis greater than 50% at the right proximal ICA visually. Velocities are discordant and appear improved from prior. Atherosclerosis on the left with no evidence of flow-limiting stenosis greater than 50%.  FINDINGS: Right: Internal Carotid Artery (ICA): Peak systolic velocity 118 cm/sec. 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 09/22/2020    DELONTE on CPAP     Osteoarthritis     Paroxysmal atrial fibrillation     PVD (peripheral vascular disease)     Secondary esophageal varices without bleeding 06/26/2017    Type 2 diabetes mellitus        Past Surgical History:   Procedure Laterality Date    ABLATION OF ARRHYTHMOGENIC FOCUS FOR ATRIAL FIBRILLATION N/A 6/24/2024    Procedure: Ablation atrial fibrillation;  Surgeon: Horacio Huerta MD;  Location: [facility] EP LAB;  Service: Cardiology;  Laterality: N/A;  afib, PVI, RFA, BERNARD (cx if SR), ZIA, anes, MB, 3 Prep, 3 hours per Dr. Huerta    ANGIOGRAM, EXTREMITY, UNILATERAL Left 1/4/2024    Procedure: ANGIOGRAM, EXTREMITY, UNILATERAL- Femoral / SMS Stent, Poss General;  Surgeon: ALEX Alfred III, MD;  Location: [facility] OR [address] [place] [address];  Service: Vascular;  Laterality: Left;  mGy : 2698.78  Gy.cm : 229.88  Contrast :  62ml  Fluro time : 13.8min    CARDIAC CATHETERIZATION      CARDIAC SURGERY      CARPAL TUNNEL RELEASE Left     CATARACT EXTRACTION      CATARACT EXTRACTION W/ INTRAOCULAR LENS  IMPLANT, BILATERAL  2008    CATHETERIZATION OF BOTH LEFT AND RIGHT HEART N/A 11/2/2018    Procedure: CATHETERIZATION, HEART, BOTH LEFT AND RIGHT;  Surgeon: Frank Gallardo MD;  Location: [facility] CATH LAB;  Service: Cardiology;  Laterality: N/A;    COLONOSCOPY  8/2013    COLONOSCOPY N/A 5/30/2016    Procedure: COLONOSCOPY;  Surgeon: nAna Tomas MD;  Location: [facility] ENDO;  Service: Endoscopy;  Laterality: N/A;    COLONOSCOPY N/A 7/17/2019    Procedure: COLONOSCOPY;  Surgeon: David Carter III, MD;  Location: [facility] ENDO;  Service: Endoscopy;  Laterality: N/A;    COLONOSCOPY N/A 12/14/2023    Procedure: COLONOSCOPY;  Surgeon: Ama Barrera MD;  Location: [facility] ENDO;  Service: Endoscopy;  Laterality: N/A;    COMPUTED TOMOGRAPHY N/A 9/9/2019    Procedure: CT (COMPUTED TOMOGRAPHY);  Surgeon: [facility] Diagnostic Provider;  Location: [facility] OR;  Service: General;  Laterality: N/A;  Microwave ablation to be done in CT.  Need CRNA and cart    COMPUTED TOMOGRAPHY N/A 1/25/2022    Procedure: Liver Microwave Ablation;  Surgeon: Red Puentes MD;  Location: [facility];  Service: General;  Laterality: N/A;    CORONARY ARTERY BYPASS GRAFT (CABG) N/A 11/5/2018    Procedure: CORONARY ARTERY BYPASS GRAFT (CABG);  Surgeon: Bear De Luna MD;  Location: [facility];  Service: Cardiothoracic;  Laterality: N/A;  TWO VESSEL BYPASS WITH AORTOTOMY AND EXCISION OF ATHEROSCLEROTIC PLAQUE    CORONARY BYPASS GRAFT ANGIOGRAPHY  3/2/2020    Procedure: Bypass graft study;  Surgeon: Cora Cormier MD;  Location: [facility] CATH LAB;  Service: Cardiology;;    ECHOCARDIOGRAM,TRANSESOPHAGEAL N/A 6/24/2024    Procedure: Transesophageal echo (BERNARD) intra-procedure log documentation;  Surgeon: Nacho Downs MD;  Location: [facility] EP LAB;  Service: Cardiology;  Laterality: N/A;     ELBOW BURSA SURGERY Right 2010    ENDOSCOPIC HARVEST OF VEIN Left 11/5/2018    Procedure: SURGICAL PROCUREMENT, VEIN, ENDOSCOPIC;  Surgeon: Bear De Luna MD;  Location: [facility];  Service: Cardiothoracic;  Laterality: Left;    ESOPHAGOGASTRODUODENOSCOPY N/A 7/17/2019    Procedure: ESOPHAGOGASTRODUODENOSCOPY (EGD);  Surgeon: David Carter III, MD;  Location: [facility];  Service: Endoscopy;  Laterality: N/A;    ESOPHAGOGASTRODUODENOSCOPY N/A 12/29/2022    Procedure: ESOPHAGOGASTRODUODENOSCOPY (EGD);  Surgeon: Issa Gayle MD;  Location: [facility];  Service: Endoscopy;  Laterality: N/A;    ESOPHAGOGASTRODUODENOSCOPY N/A 1/17/2024    Procedure: EGD (ESOPHAGOGASTRODUODENOSCOPY);  Surgeon: Issa Gayle MD;  Location: [facility];  Service: Endoscopy;  Laterality: N/A;    ESOPHAGOGASTRODUODENOSCOPY N/A 4/30/2024    Procedure: EGD (ESOPHAGOGASTRODUODENOSCOPY);  Surgeon: Eder Foley MD;  Location: [facility];  Service: Gastroenterology;  Laterality: N/A;    ETHMOIDECTOMY Bilateral 3/27/2019    Procedure: ETHMOIDECTOMY;  Surgeon: Alejandro Parkinson MD;  Location: [facility];  Service: ENT;  Laterality: Bilateral;    EYE SURGERY      FOOT SURGERY      FRONTAL SINUS OBLITERATION Bilateral 3/27/2019    Procedure: SINUSOTOMY, FRONTAL SINUS, OBLITERATIVE;  Surgeon: Alejandro Parkinson MD;  Location: [facility] OR;  Service: ENT;  Laterality: Bilateral;    FUNCTIONAL ENDOSCOPIC SINUS SURGERY (FESS) Bilateral 3/27/2019    Procedure: FESS (FUNCTIONAL ENDOSCOPIC SINUS SURGERY);  Surgeon: Alejandro Parkinson MD;  Location: [facility] OR;  Service: ENT;  Laterality: Bilateral;  Left Keila bullosa resection     INTRALUMINAL GASTROINTESTINAL TRACT IMAGING VIA CAPSULE N/A 2/2/2024    Procedure: IMAGING PROCEDURE, GI TRACT, INTRALUMINAL, VIA CAPSULE;  Surgeon: Cooper Estrella RN;  Location: [facility];  Service: Endoscopy;  Laterality: N/A;    KNEE ARTHROSCOPY W/ MENISCAL REPAIR Left 06/2019    dr boykin    KNEE SURGERY Right     LEFT HEART  CATHETERIZATION Left 3/2/2020    Procedure: CATHETERIZATION, HEART, LEFT;  Surgeon: Cora Cormier MD;  Location: [facility] CATH LAB;  Service: Cardiology;  Laterality: Left;    LIVER BIOPSY  01/2022    MAXILLARY ANTROSTOMY Bilateral 3/27/2019    Procedure: MAXILLARY ANTROSTOMY;  Surgeon: Alejandro Parkinson MD;  Location: [facility] OR;  Service: ENT;  Laterality: Bilateral;    NASAL SEPTOPLASTY N/A 3/27/2019    Procedure: SEPTOPLASTY, NOSE;  Surgeon: Alejandro Parkinson MD;  Location: [facility] OR;  Service: ENT;  Laterality: N/A;    RECTAL SURGERY  2012    STENT, SUPERIOR MESENTERIC ARTERY Left 1/4/2024    Procedure: STENT, SUPERIOR MESENTERIC ARTERY;  Surgeon: ALEX Alfred III, MD;  Location: [address];  Service: Vascular;  Laterality: Left;    TRANSURETHRAL RESECTION OF PROSTATE (TURP) WITHOUT USE OF LASER N/A 6/19/2018    Procedure: TURP, WITHOUT USING LASER;  Surgeon: Cooper Cordova IV, MD;  Location: [facility] OR;  Service: Urology;  Laterality: N/A;    TRIGGER FINGER RELEASE Left        Review of patient's allergies indicates:   Allergen Reactions    Lipitor [atorvastatin] Other (See Comments)     Muscle aches and pains as well as fatigue.     Niacin preparations Other (See Comments)     Causes broken blood vessels and bruises at 4 times normal dose.    Statins-hmg-coa reductase inhibitors Other (See Comments)     Statins cause intolerable myalgias.    Iodinated contrast media Hives     Tachycardia    Omeprazole Hives and Itching    Prilosec [omeprazole magnesium] Hives and Itching       Current Facility-Administered Medications on File Prior to Encounter   Medication    lactated ringers infusion     Current Outpatient Medications on File Prior to Encounter   Medication Sig    ACCU-CHEK GRACE PLUS METER Misc AS DIRECTED    albuterol (PROVENTIL HFA) 90 mcg/actuation inhaler Inhale 2 puffs into the lungs every 6 (six) hours as needed for Wheezing. Rescue    aspirin (ECOTRIN) 81 MG EC tablet Take 1 tablet (81 mg total) by mouth once  "daily.    blood sugar diagnostic (ACCU-CHEK GRACE PLUS TEST STRP) Strp TEST THREE TIMES A DAY    budesonide-formoterol 160-4.5 mcg (SYMBICORT) 160-4.5 mcg/actuation HFAA INHALE 2 PUFFS INTO THE LUNGS TWO TIMES A DAY. (Patient taking differently: Inhale 2 puffs into the lungs every 12 (twelve) hours. INHALE 2 PUFFS INTO THE LUNGS TWO TIMES A DAY.)    carvediloL (COREG) 12.5 MG tablet Take 2 tablets (25 mg total) by mouth 2 (two) times daily with meals.    famotidine (PEPCID) 40 MG tablet Take 40 mg by mouth once daily.    finasteride (PROSCAR) 5 mg tablet TAKE 1 TABLET BY MOUTH once daily    furosemide (LASIX) 40 MG tablet Take 1 tablet (40 mg total) by mouth 2 (two) times daily.    GLUCOSAMINE HCL/CHONDR ROSARIO A NA (OSTEO BI-FLEX ORAL) Take 1 tablet by mouth 2 (two) times daily.     inclisiran (LEQVIO) 284 mg/1.5 mL Syrg subcutaneous injection Inject 1.5 mLs (284 mg total) into the skin every 3 (three) months.    inclisiran (LEQVIO) 284 mg/1.5 mL Syrg subcutaneous injection Inject 1.5 mLs (284 mg total) into the skin every 6 (six) months.    JARDIANCE 25 mg tablet TAKE 1 TABLET BY MOUTH EVERY DAY    krill oil 500 mg Cap Take 1 capsule by mouth Daily.    lancets (ACCU-CHEK FASTCLIX LANCET DRUM) Misc TEST TWO TIMES A DAY    LANJUANUS SOLOSTAR U-100 INSULIN 100 unit/mL (3 mL) InPn pen INJECT 44 UNITS UNDER THE SKIN EVERY EVENING    levothyroxine (SYNTHROID) 300 MCG Tab Take 1 tablet (300 mcg total) by mouth every morning.    lisinopriL 10 MG tablet Take 1 tablet by mouth once daily    metFORMIN (GLUCOPHAGE-XR) 500 MG ER 24hr tablet Take 1 tablet (500 mg total) by mouth 2 (two) times daily with meals.    mirabegron (MYRBETRIQ) 25 mg Tb24 ER tablet Take 1 tablet (25 mg total) by mouth once daily.    montelukast (SINGULAIR) 10 mg tablet Take 1 tablet (10 mg total) by mouth once daily.    pen needle, diabetic (BD ULTRA-FINE MINI PEN NEEDLE) 31 gauge x 3/16" Ndle USE AS DIRECTED    propafenone (RHTHYMOL) 150 MG Tab Take 1 tablet " (150 mg total) by mouth every 8 (eight) hours.    RABEprazole (ACIPHEX) 20 mg tablet Take 1 tablet (20 mg total) by mouth 2 (two) times daily.    ranolazine (RANEXA) 1,000 mg Tb12 Take 1 tablet (1,000 mg total) by mouth 2 (two) times daily.    REPATHA SURECLICK 140 mg/mL PnIj INJECT 140mg subcutaneously every 14 days (Patient taking differently: Inject 140 mg into the skin every 14 (fourteen) days.)    rivaroxaban (XARELTO) 20 mg Tab Take 20 mg by mouth daily with dinner or evening meal.    sildenafiL (VIAGRA) 100 MG tablet Take 1/2 to 1 tablet by mouth one hour prior to intercourse. Max 100mg per day     Family History       Problem Relation (Age of Onset)    Heart disease Mother, Father, Brother          Tobacco Use    Smoking status: Former     Current packs/day: 0.00     Average packs/day: 0.5 packs/day for 4.0 years (2.0 ttl pk-yrs)     Types: Cigarettes     Start date: 1968     Quit date: 1972     Years since quittin.1    Smokeless tobacco: Former     Types: Chew     Quit date: 1976   Substance and Sexual Activity    Alcohol use: Not Currently    Drug use: No    Sexual activity: Yes     Partners: Female     Review of Systems   Constitutional:  Negative for chills, diaphoresis, fatigue and fever.   Respiratory:  Positive for chest tightness and shortness of breath.    Cardiovascular:  Negative for chest pain, palpitations and leg swelling.   Gastrointestinal:  Negative for abdominal pain, blood in stool, nausea and vomiting.        Black colored stool   Genitourinary:  Negative for difficulty urinating, dysuria and hematuria.   Neurological:  Negative for dizziness, light-headedness and headaches.   All other systems reviewed and are negative.    Objective:     Vital Signs (Most Recent):  Temp: 97.9 °F (36.6 °C) (24 035)  Pulse: 76 (24 0415)  Resp: 16 (24 035)  BP: 126/62 (24 035)  SpO2: 97 % (24) Vital Signs (24h Range):  Temp:  [97.9 °F (36.6 °C)-98.7  °F (37.1 °C)] 97.9 °F (36.6 °C)  Pulse:  [65-78] 76  Resp:  [16-20] 16  SpO2:  [97 %-100 %] 97 %  BP: (122-143)/(60-75) 126/62     Weight: 103 kg (227 lb 1.2 oz)  Body mass index is 35.56 kg/m².     Physical Exam  Vitals and nursing note reviewed.   Constitutional:       General: He is awake. He is not in acute distress.     Appearance: Normal appearance. He is well-developed and well-groomed. He is not ill-appearing, toxic-appearing or diaphoretic.   HENT:      Head: Normocephalic and atraumatic.   Eyes:      Extraocular Movements: Extraocular movements intact.      Conjunctiva/sclera: Conjunctivae normal.      Pupils: Pupils are equal, round, and reactive to light.   Cardiovascular:      Rate and Rhythm: Normal rate and regular rhythm.      Pulses: Normal pulses.      Heart sounds: Murmur heard.   Pulmonary:      Effort: Pulmonary effort is normal. No respiratory distress.      Breath sounds: Normal breath sounds. No decreased breath sounds, wheezing, rhonchi or rales.   Abdominal:      General: Bowel sounds are normal.      Palpations: Abdomen is soft.      Tenderness: There is no abdominal tenderness.   Musculoskeletal:      Cervical back: Normal range of motion and neck supple.      Right lower leg: No edema.      Left lower leg: No edema.      Comments: 5/5 strength throughout   Skin:     General: Skin is warm and dry.      Capillary Refill: Capillary refill takes less than 2 seconds.   Neurological:      General: No focal deficit present.      Mental Status: He is alert and oriented to person, place, and time. Mental status is at baseline.      GCS: GCS eye subscore is 4. GCS verbal subscore is 5. GCS motor subscore is 6.      Cranial Nerves: Cranial nerves 2-12 are intact.      Sensory: Sensation is intact.      Motor: Motor function is intact.   Psychiatric:         Mood and Affect: Mood normal.         Behavior: Behavior normal. Behavior is cooperative.              CRANIAL NERVES     CN III, IV, VI    Pupils are equal, round, and reactive to light.     LABS:  Recent Results (from the past 24 hour(s))   CBC auto differential    Collection Time: 07/24/24  9:52 PM   Result Value Ref Range    WBC 8.10 3.90 - 12.70 K/uL    RBC 2.76 (L) 4.60 - 6.20 M/uL    Hemoglobin 7.5 (L) 14.0 - 18.0 g/dL    Hematocrit 24.7 (L) 40.0 - 54.0 %    MCV 90 82 - 98 fL    MCH 27.2 27.0 - 31.0 pg    MCHC 30.4 (L) 32.0 - 36.0 g/dL    RDW 22.3 (H) 11.5 - 14.5 %    Platelets 189 150 - 450 K/uL    MPV 10.1 9.2 - 12.9 fL    Immature Granulocytes 1.0 (H) 0.0 - 0.5 %    Gran # (ANC) 5.8 1.8 - 7.7 K/uL    Immature Grans (Abs) 0.08 (H) 0.00 - 0.04 K/uL    Lymph # 1.5 1.0 - 4.8 K/uL    Mono # 0.7 0.3 - 1.0 K/uL    Eos # 0.0 0.0 - 0.5 K/uL    Baso # 0.02 0.00 - 0.20 K/uL    nRBC 1 (A) 0 /100 WBC    Gran % 71.2 38.0 - 73.0 %    Lymph % 19.0 18.0 - 48.0 %    Mono % 8.1 4.0 - 15.0 %    Eosinophil % 0.5 0.0 - 8.0 %    Basophil % 0.2 0.0 - 1.9 %    Differential Method Automated    Comprehensive metabolic panel    Collection Time: 07/24/24  9:52 PM   Result Value Ref Range    Sodium 137 136 - 145 mmol/L    Potassium 3.6 3.5 - 5.1 mmol/L    Chloride 104 95 - 110 mmol/L    CO2 24 23 - 29 mmol/L    Glucose 132 (H) 70 - 110 mg/dL    BUN 28 (H) 8 - 23 mg/dL    Creatinine 1.5 (H) 0.5 - 1.4 mg/dL    Calcium 9.2 8.7 - 10.5 mg/dL    Total Protein 6.9 6.0 - 8.4 g/dL    Albumin 3.7 3.5 - 5.2 g/dL    Total Bilirubin 0.2 0.1 - 1.0 mg/dL    Alkaline Phosphatase 63 55 - 135 U/L    AST 16 10 - 40 U/L    ALT 14 10 - 44 U/L    eGFR 47 (A) >60 mL/min/1.73 m^2    Anion Gap 9 8 - 16 mmol/L   Troponin I #1    Collection Time: 07/24/24  9:52 PM   Result Value Ref Range    Troponin I 0.024 0.000 - 0.026 ng/mL   BNP    Collection Time: 07/24/24  9:52 PM   Result Value Ref Range     (H) 0 - 99 pg/mL   Protime-INR    Collection Time: 07/24/24  9:52 PM   Result Value Ref Range    Prothrombin Time 14.6 (H) 9.0 - 12.5 sec    INR 1.4 (H) 0.8 - 1.2   APTT    Collection Time:  07/24/24  9:52 PM   Result Value Ref Range    aPTT 33.3 (H) 21.0 - 32.0 sec   Type & Screen    Collection Time: 07/24/24  9:52 PM   Result Value Ref Range    Group & Rh O POS     Indirect Rock NEG     Specimen Outdate 07/27/2024 23:59    Prepare RBC 1 Unit    Collection Time: 07/24/24  9:52 PM   Result Value Ref Range    UNIT NUMBER [id number]     Product Code [id number]     DISPENSE STATUS ISSUED     CODING SYSTEM [id number]     Unit Blood Type Code 5100     Unit Blood Type O POS     Unit Expiration [id number]     CROSSMATCH INTERPRETATION Compatible    Occult blood x 1, stool    Collection Time: 07/24/24 11:35 PM    Specimen: Stool   Result Value Ref Range    Occult Blood Positive (A) Negative       RADIOLOGY  X-Ray Chest AP Portable    Result Date: 7/24/2024  EXAM: XR CHEST AP PORTABLE CLINICAL HISTORY: Chest pain PRIOR: 05/10/2024 FINDINGS:  Hazy opacity left midlung similar prior given technique differences.  No new pulmonary consolidation or pleural effusion IMPRESSION: Stable chest exam Finalized on: 7/24/2024 10:42 PM By:  Eliz Mcbride MD [facility]# [id number]      2024-07-24 22:44:26.083    [facility]    X-Ray Chest 1 View    Result Date: 7/15/2024  EXAMINATION: XR CHEST 1 VIEW CLINICAL HISTORY: shortness of breath; TECHNIQUE: Single frontal view of the chest was performed. COMPARISON: None FINDINGS: Median sternotomy wires in place. The lungs are clear, with normal appearance of pulmonary vasculature and no pleural effusion or pneumothorax. The cardiac silhouette is normal in size. The hilar and mediastinal contours are unremarkable. Bones are intact.     No acute abnormality. Electronically signed by: Darrell Iglesias Date:    07/15/2024 Time:    12:25      EKG    MICROBIOLOGY    MDM     Amount and/or Complexity of Data Reviewed  Clinical lab tests: reviewed  Tests in the radiology section of CPT®: reviewed  Tests in the medicine section of CPT®: reviewed  Discussion of test results with the performing providers:  yes  Decide to obtain previous medical records or to obtain history from someone other than the patient: yes  Obtain history from someone other than the patient: yes  Review and summarize past medical records: yes  Discuss the patient with other providers: yes  Independent visualization of images, tracings, or specimens: yes

## 2024-07-25 NOTE — ASSESSMENT & PLAN NOTE
Anemia is likely due to chronic blood loss and Iron deficiency. Most recent hemoglobin and hematocrit are listed below.  Recent Labs     07/24/24  2152   HGB 7.5*   HCT 24.7*     Plan  - Monitor serial CBC: Every 12 hours  - Transfuse PRBC if patient becomes hemodynamically unstable, symptomatic or H/H drops below 7/21.  - Patient Has received 1 units of PRBCs on 7/24/24  - Patient's anemia is currently  being treated

## 2024-07-25 NOTE — ASSESSMENT & PLAN NOTE
Acute on chronic. Likely multifactorial.   S/p iron infusion 07/23/24.  Will plan on EGD and colonoscopy (h/o ulcers) after Xarelto held two days. Last dose 04/24/24 PM.  Start Protonix IV bid.    Start clear liquid diet tomorrow.   Mag Citrate today and Golytely tomorrow.   Monitor H/H and transfuse additional units as needed.

## 2024-07-25 NOTE — FIRST PROVIDER EVALUATION
Medical screening examination initiated.  I have conducted a focused provider triage encounter, findings are as follows:    Brief history of present illness:  Patient presents to ER for chest pain and shortness of breath.  States symptoms became worse today.  Vitals:    07/24/24 2036   Weight: 102 kg (224 lb 13.9 oz)       Pertinent physical exam:  No acute distress.    Brief workup plan:  Workup.    Preliminary workup initiated; this workup will be continued and followed by the physician or advanced practice provider that is assigned to the patient when roomed.

## 2024-07-25 NOTE — ASSESSMENT & PLAN NOTE
"Patient's FSGs are controlled on current medication regimen.  Last A1c reviewed-   Lab Results   Component Value Date    HGBA1C 5.3 04/09/2024     Most recent fingerstick glucose reviewed- No results for input(s): "POCTGLUCOSE" in the last 24 hours.  Current correctional scale  Low  Maintain anti-hyperglycemic dose as follows-   Antihyperglycemics (From admission, onward)      Start     Stop Route Frequency Ordered    07/25/24 0226  insulin aspart U-100 pen 0-5 Units         -- SubQ Every 6 hours PRN 07/25/24 0126          Plan:  -SSI  -A1c  -Accu-checks  -Hold oral hypoglycemics while patient is in the hospital  -Continue home long-acting insulin at 20% decrease, titrate up as needed  -Hypoglycemic protocol        "

## 2024-07-25 NOTE — ED NOTES
After two unsuccessful IV attempts, requested that another staff member attempt to gain IV access.

## 2024-07-25 NOTE — ASSESSMENT & PLAN NOTE
Patient with known CAD s/p CABG, which is controlled Will continue xarelto, ASA, and monitor for S/Sx of angina/ACS. Continue to monitor on telemetry.

## 2024-07-25 NOTE — ED PROVIDER NOTES
SCRIBE #1 NOTE: I, Dmitri Jennifer, am scribing for, and in the presence of, Martha Toscano MD. I have scribed the entire note.       History     Chief Complaint   Patient presents with    Shortness of Breath    Chest Pain     Mid sternal and SOB worse with excretion, patient has a hx. of GI bleed, states, stools are darker this evening.      Review of patient's allergies indicates:   Allergen Reactions    Lipitor [atorvastatin] Other (See Comments)     Muscle aches and pains as well as fatigue.     Niacin preparations Other (See Comments)     Causes broken blood vessels and bruises at 4 times normal dose.    Statins-hmg-coa reductase inhibitors Other (See Comments)     Statins cause intolerable myalgias.    Iodinated contrast media Hives     Tachycardia    Omeprazole Hives and Itching    Prilosec [omeprazole magnesium] Hives and Itching         History of Present Illness     HPI    7/24/2024, 10:54 PM  History obtained from the patient      History of Present Illness: Erendira Pretty is a 79 y.o. male patient with a PMHx of HTN, HLD, and aortic stenosis who presents to the Emergency Department for evaluation of SOB which onset 6 months ago but became worse today. Pt reports SOB on exertion for the past 6 moths consistent with CP. Pt had an iron transplant yesterday. Pt reports black stool and is on blood thinners. Pt has hx of a-fib and heart murmur. Pt consents to rectal exam. No further complaints or concerns at this time.       Arrival mode: Personal vehicle      PCP: Bhupinder Callaway MD        Past Medical History:  Past Medical History:   Diagnosis Date    Anticoagulant long-term use     Aortic stenosis     Asthma     Benign prostatic hyperplasia with nocturia     Bilateral carotid artery stenosis 07/21/2021    US CAROTID BILATERAL 07/14/2021 IMPRESSION: Atherosclerosis with suspected stenosis greater than 50% at the right proximal ICA visually. Velocities are discordant and appear improved from prior.  Atherosclerosis on the left with no evidence of flow-limiting stenosis greater than 50%.  FINDINGS: Right: Internal Carotid Artery (ICA): Peak systolic velocity 118 cm/sec. End diastolic velocity 35 cm/sec    BPH (benign prostatic hyperplasia)     CAD (coronary artery disease)     40% lesion 2002;violet    Cirrhosis     Claudication 04/09/2014    Colon polyp     COPD (chronic obstructive pulmonary disease)     ED (erectile dysfunction)     Encounter for blood transfusion     Ex-smoker     Hearing loss NEC     Hepatitis C     Cured;reji brown 2015    Hyperlipidemia     Hypertension     Hypothyroid     s/p tx graves    Open angle with borderline findings and low glaucoma risk in both eyes 09/22/2020    DELONTE on CPAP     Osteoarthritis     Paroxysmal atrial fibrillation     PVD (peripheral vascular disease)     Secondary esophageal varices without bleeding 06/26/2017    Type 2 diabetes mellitus        Past Surgical History:  Past Surgical History:   Procedure Laterality Date    ABLATION OF ARRHYTHMOGENIC FOCUS FOR ATRIAL FIBRILLATION N/A 6/24/2024    Procedure: Ablation atrial fibrillation;  Surgeon: Horacio Huerta MD;  Location: [facility] EP LAB;  Service: Cardiology;  Laterality: N/A;  afib, PVI, RFA, BERNARD (cx if SR), ZIA, anes, MB, 3 Prep, 3 hours per Dr. Huerta    ANGIOGRAM, EXTREMITY, UNILATERAL Left 1/4/2024    Procedure: ANGIOGRAM, EXTREMITY, UNILATERAL- Femoral / SMS Stent, Poss General;  Surgeon: ALEX Alfred III, MD;  Location: [facility] OR [address] [place] [address];  Service: Vascular;  Laterality: Left;  mGy : 2698.78  Gy.cm : 229.88  Contrast : 62ml  Fluro time : 13.8min    CARDIAC CATHETERIZATION      CARDIAC SURGERY      CARPAL TUNNEL RELEASE Left     CATARACT EXTRACTION      CATARACT EXTRACTION W/ INTRAOCULAR LENS  IMPLANT, BILATERAL  2008    CATHETERIZATION OF BOTH LEFT AND RIGHT HEART N/A 11/2/2018    Procedure: CATHETERIZATION, HEART, BOTH LEFT AND RIGHT;  Surgeon: Frank Gallardo MD;  Location: [facility] CATH  LAB;  Service: Cardiology;  Laterality: N/A;    COLONOSCOPY  8/2013    COLONOSCOPY N/A 5/30/2016    Procedure: COLONOSCOPY;  Surgeon: Anna Tomas MD;  Location: [facility] ENDO;  Service: Endoscopy;  Laterality: N/A;    COLONOSCOPY N/A 7/17/2019    Procedure: COLONOSCOPY;  Surgeon: David Carter III, MD;  Location: [facility] ENDO;  Service: Endoscopy;  Laterality: N/A;    COLONOSCOPY N/A 12/14/2023    Procedure: COLONOSCOPY;  Surgeon: Ama Barrera MD;  Location: [facility] ENDO;  Service: Endoscopy;  Laterality: N/A;    COMPUTED TOMOGRAPHY N/A 9/9/2019    Procedure: CT (COMPUTED TOMOGRAPHY);  Surgeon: [facility];  Location: [facility];  Service: General;  Laterality: N/A;  Microwave ablation to be done in CT.  Need CRNA and cart    COMPUTED TOMOGRAPHY N/A 1/25/2022    Procedure: Liver Microwave Ablation;  Surgeon: Red Puentes MD;  Location: [facility];  Service: General;  Laterality: N/A;    CORONARY ARTERY BYPASS GRAFT (CABG) N/A 11/5/2018    Procedure: CORONARY ARTERY BYPASS GRAFT (CABG);  Surgeon: Bear De Luna MD;  Location: [facility];  Service: Cardiothoracic;  Laterality: N/A;  TWO VESSEL BYPASS WITH AORTOTOMY AND EXCISION OF ATHEROSCLEROTIC PLAQUE    CORONARY BYPASS GRAFT ANGIOGRAPHY  3/2/2020    Procedure: Bypass graft study;  Surgeon: Cora Cormier MD;  Location: [facility] CATH LAB;  Service: Cardiology;;    ECHOCARDIOGRAM,TRANSESOPHAGEAL N/A 6/24/2024    Procedure: Transesophageal echo (BERNARD) intra-procedure log documentation;  Surgeon: Nacho Downs MD;  Location: [facility] EP LAB;  Service: Cardiology;  Laterality: N/A;    ELBOW BURSA SURGERY Right 2010    ENDOSCOPIC HARVEST OF VEIN Left 11/5/2018    Procedure: SURGICAL PROCUREMENT, VEIN, ENDOSCOPIC;  Surgeon: Bear De Luna MD;  Location: [facility];  Service: Cardiothoracic;  Laterality: Left;    ESOPHAGOGASTRODUODENOSCOPY N/A 7/17/2019    Procedure: ESOPHAGOGASTRODUODENOSCOPY (EGD);  Surgeon: David Carter III, MD;  Location: [facility];   Service: Endoscopy;  Laterality: N/A;    ESOPHAGOGASTRODUODENOSCOPY N/A 12/29/2022    Procedure: ESOPHAGOGASTRODUODENOSCOPY (EGD);  Surgeon: Issa Gayle MD;  Location: [facility];  Service: Endoscopy;  Laterality: N/A;    ESOPHAGOGASTRODUODENOSCOPY N/A 1/17/2024    Procedure: EGD (ESOPHAGOGASTRODUODENOSCOPY);  Surgeon: Issa Gayle MD;  Location: [facility];  Service: Endoscopy;  Laterality: N/A;    ESOPHAGOGASTRODUODENOSCOPY N/A 4/30/2024    Procedure: EGD (ESOPHAGOGASTRODUODENOSCOPY);  Surgeon: Eder Foley MD;  Location: [facility];  Service: Gastroenterology;  Laterality: N/A;    ETHMOIDECTOMY Bilateral 3/27/2019    Procedure: ETHMOIDECTOMY;  Surgeon: Alejandro Parkinson MD;  Location: [facility] OR;  Service: ENT;  Laterality: Bilateral;    EYE SURGERY      FOOT SURGERY      FRONTAL SINUS OBLITERATION Bilateral 3/27/2019    Procedure: SINUSOTOMY, FRONTAL SINUS, OBLITERATIVE;  Surgeon: Alejandro Parkinson MD;  Location: [facility] OR;  Service: ENT;  Laterality: Bilateral;    FUNCTIONAL ENDOSCOPIC SINUS SURGERY (FESS) Bilateral 3/27/2019    Procedure: FESS (FUNCTIONAL ENDOSCOPIC SINUS SURGERY);  Surgeon: Alejandro Parkinson MD;  Location: [facility];  Service: ENT;  Laterality: Bilateral;  Left Keila bullosa resection     INTRALUMINAL GASTROINTESTINAL TRACT IMAGING VIA CAPSULE N/A 2/2/2024    Procedure: IMAGING PROCEDURE, GI TRACT, INTRALUMINAL, VIA CAPSULE;  Surgeon: Cooper Estrella RN;  Location: [facility];  Service: Endoscopy;  Laterality: N/A;    KNEE ARTHROSCOPY W/ MENISCAL REPAIR Left 06/2019    dr boykin    KNEE SURGERY Right     LEFT HEART CATHETERIZATION Left 3/2/2020    Procedure: CATHETERIZATION, HEART, LEFT;  Surgeon: Cora Cormier MD;  Location: [facility] CATH LAB;  Service: Cardiology;  Laterality: Left;    LIVER BIOPSY  01/2022    MAXILLARY ANTROSTOMY Bilateral 3/27/2019    Procedure: MAXILLARY ANTROSTOMY;  Surgeon: Alejandro Parkinson MD;  Location: [facility] OR;  Service: ENT;  Laterality: Bilateral;    NASAL SEPTOPLASTY N/A  3/27/2019    Procedure: SEPTOPLASTY, NOSE;  Surgeon: Aleajndro Parkinson MD;  Location: [facility] OR;  Service: ENT;  Laterality: N/A;    RECTAL SURGERY      STENT, SUPERIOR MESENTERIC ARTERY Left 2024    Procedure: STENT, SUPERIOR MESENTERIC ARTERY;  Surgeon: ALEX Alfred III, MD;  [facility];  Service: Vascular;  Laterality: Left;    TRANSURETHRAL RESECTION OF PROSTATE (TURP) WITHOUT USE OF LASER N/A 2018    Procedure: TURP, WITHOUT USING LASER;  Surgeon: Cooper Cordova IV, MD;  Location: [facility] OR;  Service: Urology;  Laterality: N/A;    TRIGGER FINGER RELEASE Left          Family History:  Family History   Problem Relation Name Age of Onset    Heart disease Mother      Heart disease Father      Heart disease Brother      Colon cancer Neg Hx         Social History:  Social History     Tobacco Use    Smoking status: Former     Current packs/day: 0.00     Average packs/day: 0.5 packs/day for 4.0 years (2.0 ttl pk-yrs)     Types: Cigarettes     Start date: 1968     Quit date: 1972     Years since quittin.1    Smokeless tobacco: Former     Types: Chew     Quit date: 1976   Substance and Sexual Activity    Alcohol use: Not Currently    Drug use: No    Sexual activity: Yes     Partners: Female        Review of Systems     Review of Systems   Constitutional:  Negative for chills, diaphoresis and fever.   HENT:  Negative for congestion.    Respiratory:  Positive for shortness of breath. Negative for cough.    Cardiovascular:  Positive for chest pain.   Gastrointestinal:  Negative for abdominal pain, diarrhea, nausea and vomiting.   Genitourinary:  Negative for dysuria.   Musculoskeletal:  Negative for back pain.   Skin:  Negative for rash.   Neurological:  Negative for dizziness and headaches.   All other systems reviewed and are negative.       Physical Exam     Initial Vitals   BP Pulse Resp Temp SpO2   24 2039 24 2041 24    126/75 76 18 98.1 °F (36.7 °C) 100 %      MAP       --                 Physical Exam  Nursing Notes and Vital Signs Reviewed.  Constitutional: Patient is in no acute distress. Well-developed and well-nourished.  Head: Atraumatic. Normocephalic.  Eyes: EOM intact. Conjunctivae are not pale. No scleral icterus.  ENT: Mucous membranes are moist.   Neck: Supple. Full ROM.   Cardiovascular: Regular rate. Regular rhythm. 2/6 murmur in left intercostal space.   Pulmonary/Chest: No respiratory distress. Clear to auscultation bilaterally. No wheezing or rales.  Abdominal: Soft and non-distended.  There is no tenderness.  No rebound, guarding, or rigidity.   Musculoskeletal: Moves all extremities. No obvious deformities. 2+ pitting edema to BLE.   Skin: Warm and dry.  Neurological:  Alert, awake, and appropriate.  Normal speech.  No acute focal neurological deficits are appreciated.  Psychiatric: Normal affect. Good eye contact. Appropriate in content.   Rectal: Melanotic stool.       ED Course   Critical Care    Date/Time: 7/25/2024 1:08 AM    Performed by: Dmitri Rodriguez  Authorized by: Martha Toscano MD  Direct patient critical care time: 15 minutes  Additional history critical care time: 15 minutes  Ordering / reviewing critical care time: 15 minutes  Documentation critical care time: 10 minutes  Consulting other physicians critical care time: 7 minutes  Total critical care time (exclusive of procedural time) : 62 minutes  Critical care time was exclusive of separately billable procedures and treating other patients and teaching time.  Critical care was necessary to treat or prevent imminent or life-threatening deterioration of the following conditions: LESTER, symptomatic anemia, and melana.  Critical care was time spent personally by me on the following activities: blood draw for specimens, development of treatment plan with patient or surrogate, discussions with consultants, interpretation of cardiac output  measurements, evaluation of patient's response to treatment, examination of patient, ordering and performing treatments and interventions, obtaining history from patient or surrogate, ordering and review of laboratory studies, ordering and review of radiographic studies, pulse oximetry, re-evaluation of patient's condition and review of old charts.        ED Vital Signs:  Vitals:    07/24/24 2036 07/24/24 2039 07/24/24 2041 07/25/24 0015   BP:  126/75  136/64   Pulse:   76 78   Resp:  18     Temp:  98.1 °F (36.7 °C)  98.7 °F (37.1 °C)   TempSrc:  Oral  Oral   SpO2:  100%  98%   Weight: 102 kg (224 lb 13.9 oz) 103 kg (227 lb 1.2 oz)      07/25/24 0030 07/25/24 0032 07/25/24 0132 07/25/24 0205   BP: 122/61 122/61 126/60 (!) 143/64   Pulse: 70 70 73 70   Resp: 20 20 17 16   Temp:  98.6 °F (37 °C)  98.6 °F (37 °C)   TempSrc:  Oral  Oral   SpO2: 99% 99% 100% 98%   Weight:        07/25/24 0216 07/25/24 0352 07/25/24 0415   BP:  126/62    Pulse: 65 70 76   Resp:  16    Temp:  97.9 °F (36.6 °C)    TempSrc:      SpO2:  97%    Weight:          Abnormal Lab Results:  Labs Reviewed   CBC W/ AUTO DIFFERENTIAL - Abnormal       Result Value    WBC 8.10      RBC 2.76 (*)     Hemoglobin 7.5 (*)     Hematocrit 24.7 (*)     MCV 90      MCH 27.2      MCHC 30.4 (*)     RDW 22.3 (*)     Platelets 189      MPV 10.1      Immature Granulocytes 1.0 (*)     Gran # (ANC) 5.8      Immature Grans (Abs) 0.08 (*)     Lymph # 1.5      Mono # 0.7      Eos # 0.0      Baso # 0.02      nRBC 1 (*)     Gran % 71.2      Lymph % 19.0      Mono % 8.1      Eosinophil % 0.5      Basophil % 0.2      Differential Method Automated     COMPREHENSIVE METABOLIC PANEL - Abnormal    Sodium 137      Potassium 3.6      Chloride 104      CO2 24      Glucose 132 (*)     BUN 28 (*)     Creatinine 1.5 (*)     Calcium 9.2      Total Protein 6.9      Albumin 3.7      Total Bilirubin 0.2      Alkaline Phosphatase 63      AST 16      ALT 14      eGFR 47 (*)     Anion Gap 9      B-TYPE NATRIURETIC PEPTIDE - Abnormal     (*)    PROTIME-INR - Abnormal    Prothrombin Time 14.6 (*)     INR 1.4 (*)    APTT - Abnormal    aPTT 33.3 (*)    OCCULT BLOOD X 1, STOOL - Abnormal    Occult Blood Positive (*)    TROPONIN I    Troponin I 0.024     HEMOGLOBIN A1C   TYPE & SCREEN    Group & Rh O POS      [place] NEG      Specimen Outdate 07/27/2024 23:59     POCT GLUCOSE MONITORING CONTINUOUS   PREPARE RBC SOFT    UNIT NUMBER [id number]      Product Code K3959K56      DISPENSE STATUS ISSUED      CODING SYSTEM [id number]      Unit Blood Type Code 5100      Unit Blood Type O POS      Unit Expiration [id number]      CROSSMATCH INTERPRETATION Compatible          All Lab Results:  Results for orders placed or performed during the hospital encounter of 07/24/24   CBC auto differential   Result Value Ref Range    WBC 8.10 3.90 - 12.70 K/uL    RBC 2.76 (L) 4.60 - 6.20 M/uL    Hemoglobin 7.5 (L) 14.0 - 18.0 g/dL    Hematocrit 24.7 (L) 40.0 - 54.0 %    MCV 90 82 - 98 fL    MCH 27.2 27.0 - 31.0 pg    MCHC 30.4 (L) 32.0 - 36.0 g/dL    RDW 22.3 (H) 11.5 - 14.5 %    Platelets 189 150 - 450 K/uL    MPV 10.1 9.2 - 12.9 fL    Immature Granulocytes 1.0 (H) 0.0 - 0.5 %    Gran # (ANC) 5.8 1.8 - 7.7 K/uL    Immature Grans (Abs) 0.08 (H) 0.00 - 0.04 K/uL    Lymph # 1.5 1.0 - 4.8 K/uL    Mono # 0.7 0.3 - 1.0 K/uL    Eos # 0.0 0.0 - 0.5 K/uL    Baso # 0.02 0.00 - 0.20 K/uL    nRBC 1 (A) 0 /100 WBC    Gran % 71.2 38.0 - 73.0 %    Lymph % 19.0 18.0 - 48.0 %    Mono % 8.1 4.0 - 15.0 %    Eosinophil % 0.5 0.0 - 8.0 %    Basophil % 0.2 0.0 - 1.9 %    Differential Method Automated    Comprehensive metabolic panel   Result Value Ref Range    Sodium 137 136 - 145 mmol/L    Potassium 3.6 3.5 - 5.1 mmol/L    Chloride 104 95 - 110 mmol/L    CO2 24 23 - 29 mmol/L    Glucose 132 (H) 70 - 110 mg/dL    BUN 28 (H) 8 - 23 mg/dL    Creatinine 1.5 (H) 0.5 - 1.4 mg/dL    Calcium 9.2 8.7 - 10.5 mg/dL    Total Protein 6.9 6.0 - 8.4  g/dL    Albumin 3.7 3.5 - 5.2 g/dL    Total Bilirubin 0.2 0.1 - 1.0 mg/dL    Alkaline Phosphatase 63 55 - 135 U/L    AST 16 10 - 40 U/L    ALT 14 10 - 44 U/L    eGFR 47 (A) >60 mL/min/1.73 m^2    Anion Gap 9 8 - 16 mmol/L   Troponin I #1   Result Value Ref Range    Troponin I 0.024 0.000 - 0.026 ng/mL   BNP   Result Value Ref Range     (H) 0 - 99 pg/mL   Protime-INR   Result Value Ref Range    Prothrombin Time 14.6 (H) 9.0 - 12.5 sec    INR 1.4 (H) 0.8 - 1.2   APTT   Result Value Ref Range    aPTT 33.3 (H) 21.0 - 32.0 sec   Occult blood x 1, stool    Specimen: Stool   Result Value Ref Range    Occult Blood Positive (A) Negative   Type & Screen   Result Value Ref Range    Group & Rh O POS     [place] NEG     Specimen Outdate 07/27/2024 23:59    Prepare RBC 1 Unit   Result Value Ref Range    UNIT NUMBER [id number]     Product Code [id number]     DISPENSE STATUS ISSUED     CODING SYSTEM [id number]     Unit Blood Type Code 5100     Unit Blood Type O POS     Unit Expiration [id number]     CROSSMATCH INTERPRETATION Compatible      *Note: Due to a large number of results and/or encounters for the requested time period, some results have not been displayed. A complete set of results can be found in Results Review.         Imaging Results:  Imaging Results              X-Ray Chest AP Portable (Final result)  Result time 07/24/24 22:42:22      Final result by Eliz Mcbride MD (07/24/24 22:42:22)                   Narrative:    EXAM: XR CHEST AP PORTABLE    CLINICAL HISTORY: Chest pain    PRIOR:  05/10/2024    FINDINGS:   Hazy opacity left midlung similar prior given technique differences.  No new pulmonary consolidation or pleural effusion    IMPRESSION:  Stable chest exam    Finalized on: 7/24/2024 10:42 PM By:  Eliz Mcbride MD  [facility]# [id number]      2024-07-24 22:44:26.083    [facility]                                     The EKG was ordered, reviewed, and independently interpreted by the ED  provider.  Interpretation time: 20:49  Rate: 72 BPM  Rhythm: Sinus rhythm with 1st degree A-V block with premature atrial complexes  Interpretation: No STEMI.           The Emergency Provider reviewed the vital signs and test results, which are outlined above.     ED Discussion     1:10 AM: Discussed case with Aric Kaufman MD ([facility]). Dr. Kaufman agrees with current care and management of pt and accepts admission.   Admitting Service: [facility]  Admitting Physician: Dr. Kaufman  Admit to: med/tele inpt    1:13 AM: Re-evaluated pt. I have discussed test results, shared treatment plan, and the need for admission with patient and family at bedside. Pt and family express understanding at this time and agree with all information. All questions answered. Pt and family have no further questions or concerns at this time. Pt is ready for admit.           Medical Decision Making  Amount and/or Complexity of Data Reviewed  Labs: ordered. Decision-making details documented in ED Course.  Radiology: ordered. Decision-making details documented in ED Course.  ECG/medicine tests: ordered and independent interpretation performed. Decision-making details documented in ED Course.    Risk  Prescription drug management.  Decision regarding hospitalization.                ED Medication(s):  Medications   0.9%  NaCl infusion (for blood administration) (has no administration in time range)   sodium chloride 0.9% flush 3 mL (has no administration in time range)   melatonin tablet 6 mg (has no administration in time range)   ondansetron injection 4 mg (has no administration in time range)   promethazine tablet 25 mg (has no administration in time range)   polyethylene glycol packet 17 g (has no administration in time range)   acetaminophen tablet 650 mg (has no administration in time range)   simethicone chewable tablet 80 mg (has no administration in time range)   aluminum-magnesium hydroxide-simethicone 200-200-20  mg/5 mL suspension 30 mL (has no administration in time range)   acetaminophen suppository 650 mg (has no administration in time range)   HYDROcodone-acetaminophen 5-325 mg per tablet 1 tablet (has no administration in time range)   morphine injection 2 mg (has no administration in time range)   naloxone 0.4 mg/mL injection 0.02 mg (has no administration in time range)   glucose chewable tablet 16 g (has no administration in time range)   glucose chewable tablet 24 g (has no administration in time range)   glucagon (human recombinant) injection 1 mg (has no administration in time range)   dextrose 10% bolus 125 mL 125 mL (has no administration in time range)   dextrose 10% bolus 250 mL 250 mL (has no administration in time range)   glucagon (human recombinant) injection 1 mg (has no administration in time range)   insulin aspart U-100 pen 0-5 Units (has no administration in time range)   dextrose 10% bolus 125 mL 125 mL (has no administration in time range)   dextrose 10% bolus 250 mL 250 mL (has no administration in time range)   pantoprazole injection 40 mg (40 mg Intravenous Given 7/24/24 4557)       Current Discharge Medication List                  Scribe Attestation:   Scribe #1: I performed the above scribed service and the documentation accurately describes the services I performed. I attest to the accuracy of the note.     Attending:   Physician Attestation Statement for Scribe #1: I, Martha Toscano MD, personally performed the services described in this documentation, as scribed by Dmitri Rodriguez, in my presence, and it is both accurate and complete.           Clinical Impression       ICD-10-CM ICD-9-CM   1. Melena  K92.1 578.1   2. Chest pain  R07.9 786.50   3. Symptomatic anemia  D64.9 285.9   4. LESTER (acute kidney injury)  N17.9 584.9       Disposition:   Disposition: Admitted  Condition: Serious         Martha Toscano MD  07/25/24 8548

## 2024-07-25 NOTE — ASSESSMENT & PLAN NOTE
Anemia is likely due to chronic blood loss and Iron deficiency. Most recent hemoglobin and hematocrit are listed below.  Recent Labs     07/24/24  2152 07/25/24  0518   HGB 7.5* 8.6*   HCT 24.7* 28.1*     Plan  - Monitor serial CBC: Every 12 hours  - Transfuse PRBC if patient becomes hemodynamically unstable, symptomatic or H/H drops below 7/21.  - Patient Has received 1 units of PRBCs on 7/24/24  - Patient's anemia is currently  being treated

## 2024-07-25 NOTE — ASSESSMENT & PLAN NOTE
LESTER is likely due to pre-renal azotemia due to dehydration. Baseline creatinine is  1.2 . Most recent creatinine and eGFR are listed below.  Recent Labs     07/24/24  2152   CREATININE 1.5*   EGFRNORACEVR 47*      Plan  - LESTER is  being treated  - Avoid nephrotoxins and renally dose meds for GFR listed above  - Monitor urine output, serial BMP, and adjust therapy as needed

## 2024-07-25 NOTE — H&P
[facility] Medicine  History & Physical    Patient Name: Erendira Pretty  MRN: [id number]  Patient Class: IP- Inpatient  Admission Date: 7/24/2024  Attending Physician: Magalis Machado MD   Primary Care Provider: Bhupinder Callaway MD         Patient information was obtained from patient, past medical records, and ER records.     Subjective:     Principal Problem:Symptomatic anemia    Chief Complaint:   Chief Complaint   Patient presents with    Shortness of Breath    Chest Pain     Mid sternal and SOB worse with excretion, patient has a hx. of GI bleed, states, stools are darker this evening.         HPI: Erendira Pretty is a 79 y.o. 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  Presented to the ER for persistent shortness of breath that worsened today which is exacerbated by exertion.  Patient reports he has been having symptoms for the last 6 months.  Patient was hospitalized numerous times for symptomatic anemia requiring blood transfusions and iron infusions.  Patient had pill camera, EGD, and colonoscopy performed without any identifiable source of active bleed.  Patient was taken aspirin, Plavix, and Xarelto, but has since been taken off of Plavix in his only  currently taking aspirin and Xarelto.  Patient had an iron infusion performed on 07/23/2024.  Patient was evaluated by his cardiologist (Dr. Gallardo) on 07/17/2024.  Patient is scheduled to have Watchman procedure performed in the next couple of weeks.  Patient states he has also had associated black colored stools, but this has been ongoing as well.  Denies any passage of blood clots or bright red/dark red blood that feels toilet bowl.  Denies any use of home oxygen.  Denies any actual chest pain or palpitations.  Denies lightheadedness/dizziness/cough, cold, congestion, recent illnesses, sick contacts, or any other symptoms at this time.    ER workup revealed H/H of 7.5/24.7 (previously 9.0/29.4 on 07/16/2024), BUN/creatinine 28/1.5 (previously 13/1.2 on 07/16/2024),  mg/dL, , troponin negative, occult blood stool positive, chest x-ray negative for acute findings, EKG revealed sinus rhythm with first-degree AV block with PACs with a ventricular rate of 72 beats per minute and a QT/QTC of 378/413.  Patient received 40 mg Protonix IV and has 1 unit of PRBCs ordered by ER provider.  Hospital Medicine consulted to admit patient for symptomatic anemia in setting of GI bleed and LESTER.  Patient in agreement with treatment plan.  Patient will be admitted under inpatient status.    PCP: Bhupinder Callaway      Past Medical History:   Diagnosis Date    Anticoagulant long-term use     Aortic stenosis     Asthma     Benign prostatic hyperplasia with nocturia     Bilateral carotid artery stenosis 07/21/2021    US CAROTID BILATERAL 07/14/2021 IMPRESSION: Atherosclerosis with suspected stenosis greater than 50% at the right proximal ICA visually. Velocities are discordant and appear improved from prior. Atherosclerosis on the left with no evidence of flow-limiting stenosis greater than 50%.  FINDINGS: Right: Internal Carotid Artery (ICA): Peak systolic velocity 118 cm/sec. End diastolic velocity 35 cm/sec    BPH  (benign prostatic hyperplasia)     CAD (coronary artery disease)     40% lesion 2002;lazo    Cirrhosis     Claudication 04/09/2014    Colon polyp     COPD (chronic obstructive pulmonary disease)     ED (erectile dysfunction)     Encounter for blood transfusion     Ex-smoker     Hearing loss NEC     Hepatitis C     Cured;reji brown    Hyperlipidemia     Hypertension     Hypothyroid     s/p tx graves    Open angle with borderline findings and low glaucoma risk in both eyes 09/22/2020    DELONTE on CPAP     Osteoarthritis     Paroxysmal atrial fibrillation     PVD (peripheral vascular disease)     Secondary esophageal varices without bleeding 06/26/2017    Type 2 diabetes mellitus        Past Surgical History:   Procedure Laterality Date    ABLATION OF ARRHYTHMOGENIC FOCUS FOR ATRIAL FIBRILLATION N/A 6/24/2024    Procedure: Ablation atrial fibrillation;  Surgeon: Horacio Huerta MD;  Location: [facility] EP LAB;  Service: Cardiology;  Laterality: N/A;  afib, PVI, RFA, BERNARD (cx if SR), ZIA, anes, MB, 3 Prep, 3 hours per Dr. Huerta    ANGIOGRAM, EXTREMITY, UNILATERAL Left 1/4/2024    Procedure: ANGIOGRAM, EXTREMITY, UNILATERAL- Femoral / SMS Stent, Poss General;  Surgeon: ALEX Alfred III, MD;  Location: [facility] OR [address] [place] [address];  Service: Vascular;  Laterality: Left;  mGy : 2698.78  Gy.cm : 229.88  Contrast : 62ml  Fluro time : 13.8min    CARDIAC CATHETERIZATION      CARDIAC SURGERY      CARPAL TUNNEL RELEASE Left     CATARACT EXTRACTION      CATARACT EXTRACTION W/ INTRAOCULAR LENS  IMPLANT, BILATERAL  2008    CATHETERIZATION OF BOTH LEFT AND RIGHT HEART N/A 11/2/2018    Procedure: CATHETERIZATION, HEART, BOTH LEFT AND RIGHT;  Surgeon: Frank Lazo MD;  Location: [facility] CATH LAB;  Service: Cardiology;  Laterality: N/A;    COLONOSCOPY  8/2013    COLONOSCOPY N/A 5/30/2016    Procedure: COLONOSCOPY;  Surgeon: Anna Tomas MD;  Location: [facility] ENDO;  Service: Endoscopy;  Laterality: N/A;    COLONOSCOPY N/A  7/17/2019    Procedure: COLONOSCOPY;  Surgeon: David Carter III, MD;  Location: [facility] ENDO;  Service: Endoscopy;  Laterality: N/A;    COLONOSCOPY N/A 12/14/2023    Procedure: COLONOSCOPY;  Surgeon: Aam Barrera MD;  Location: [facility] ENDO;  Service: Endoscopy;  Laterality: N/A;    COMPUTED TOMOGRAPHY N/A 9/9/2019    Procedure: CT (COMPUTED TOMOGRAPHY);  Surgeon: [facility] Diagnostic Provider;  Location: [facility] OR;  Service: General;  Laterality: N/A;  Microwave ablation to be done in CT.  Need CRNA and cart    COMPUTED TOMOGRAPHY N/A 1/25/2022    Procedure: Liver Microwave Ablation;  Surgeon: Red Puentes MD;  Location: [facility];  Service: General;  Laterality: N/A;    CORONARY ARTERY BYPASS GRAFT (CABG) N/A 11/5/2018    Procedure: CORONARY ARTERY BYPASS GRAFT (CABG);  Surgeon: Bear De Luna MD;  Location: [facility] OR;  Service: Cardiothoracic;  Laterality: N/A;  TWO VESSEL BYPASS WITH AORTOTOMY AND EXCISION OF ATHEROSCLEROTIC PLAQUE    CORONARY BYPASS GRAFT ANGIOGRAPHY  3/2/2020    Procedure: Bypass graft study;  Surgeon: Cora Cormier MD;  Location: [facility] CATH LAB;  Service: Cardiology;;    ECHOCARDIOGRAM,TRANSESOPHAGEAL N/A 6/24/2024    Procedure: Transesophageal echo (BERNARD) intra-procedure log documentation;  Surgeon: Nacho Downs MD;  Location: [facility] EP LAB;  Service: Cardiology;  Laterality: N/A;    ELBOW BURSA SURGERY Right 2010    ENDOSCOPIC HARVEST OF VEIN Left 11/5/2018    Procedure: SURGICAL PROCUREMENT, VEIN, ENDOSCOPIC;  Surgeon: Bear De Luna MD;  Location: [facility] OR;  Service: Cardiothoracic;  Laterality: Left;    ESOPHAGOGASTRODUODENOSCOPY N/A 7/17/2019    Procedure: ESOPHAGOGASTRODUODENOSCOPY (EGD);  Surgeon: David Carter III, MD;  Location: [facility] ENDO;  Service: Endoscopy;  Laterality: N/A;    ESOPHAGOGASTRODUODENOSCOPY N/A 12/29/2022    Procedure: ESOPHAGOGASTRODUODENOSCOPY (EGD);  Surgeon: Issa Gayle MD;  Location: [facility] ENDO;  Service: Endoscopy;  Laterality: N/A;     ESOPHAGOGASTRODUODENOSCOPY N/A 1/17/2024    Procedure: EGD (ESOPHAGOGASTRODUODENOSCOPY);  Surgeon: Issa Gayle MD;  Location: [facility];  Service: Endoscopy;  Laterality: N/A;    ESOPHAGOGASTRODUODENOSCOPY N/A 4/30/2024    Procedure: EGD (ESOPHAGOGASTRODUODENOSCOPY);  Surgeon: Eder Foley MD;  Location: [facility] ENDO;  Service: Gastroenterology;  Laterality: N/A;    ETHMOIDECTOMY Bilateral 3/27/2019    Procedure: ETHMOIDECTOMY;  Surgeon: Alejandro Parkinson MD;  Location: [facility] OR;  Service: ENT;  Laterality: Bilateral;    EYE SURGERY      FOOT SURGERY      FRONTAL SINUS OBLITERATION Bilateral 3/27/2019    Procedure: SINUSOTOMY, FRONTAL SINUS, OBLITERATIVE;  Surgeon: Alejandro Parkinson MD;  Location: [facility];  Service: ENT;  Laterality: Bilateral;    FUNCTIONAL ENDOSCOPIC SINUS SURGERY (FESS) Bilateral 3/27/2019    Procedure: FESS (FUNCTIONAL ENDOSCOPIC SINUS SURGERY);  Surgeon: Alejandro Parkinson MD;  Location: [facility];  Service: ENT;  Laterality: Bilateral;  Left Keila bullosa resection     INTRALUMINAL GASTROINTESTINAL TRACT IMAGING VIA CAPSULE N/A 2/2/2024    Procedure: IMAGING PROCEDURE, GI TRACT, INTRALUMINAL, VIA CAPSULE;  Surgeon: Cooper Estrella RN;  Location: [facility];  Service: Endoscopy;  Laterality: N/A;    KNEE ARTHROSCOPY W/ MENISCAL REPAIR Left 06/2019    dr boykin    KNEE SURGERY Right     LEFT HEART CATHETERIZATION Left 3/2/2020    Procedure: CATHETERIZATION, HEART, LEFT;  Surgeon: Cora Cormier MD;  Location: [facility] CATH LAB;  Service: Cardiology;  Laterality: Left;    LIVER BIOPSY  01/2022    MAXILLARY ANTROSTOMY Bilateral 3/27/2019    Procedure: MAXILLARY ANTROSTOMY;  Surgeon: Alejandro Parkinson MD;  Location: [facility] OR;  Service: ENT;  Laterality: Bilateral;    NASAL SEPTOPLASTY N/A 3/27/2019    Procedure: SEPTOPLASTY, NOSE;  Surgeon: Alejandro Parkinson MD;  Location: [facility] OR;  Service: ENT;  Laterality: N/A;    RECTAL SURGERY  2012    STENT, SUPERIOR MESENTERIC ARTERY Left 1/4/2024    Procedure: STENT, SUPERIOR  MESENTERIC ARTERY;  Surgeon: ALEX Alfred III, MD;  [facility];  Service: Vascular;  Laterality: Left;    TRANSURETHRAL RESECTION OF PROSTATE (TURP) WITHOUT USE OF LASER N/A 6/19/2018    Procedure: TURP, WITHOUT USING LASER;  Surgeon: Cooper Cordova IV, MD;  Location: [facility] OR;  Service: Urology;  Laterality: N/A;    TRIGGER FINGER RELEASE Left        Review of patient's allergies indicates:   Allergen Reactions    Lipitor [atorvastatin] Other (See Comments)     Muscle aches and pains as well as fatigue.     Niacin preparations Other (See Comments)     Causes broken blood vessels and bruises at 4 times normal dose.    Statins-hmg-coa reductase inhibitors Other (See Comments)     Statins cause intolerable myalgias.    Iodinated contrast media Hives     Tachycardia    Omeprazole Hives and Itching    Prilosec [omeprazole magnesium] Hives and Itching       Current Facility-Administered Medications on File Prior to Encounter   Medication    lactated ringers infusion     Current Outpatient Medications on File Prior to Encounter   Medication Sig    ACCU-CHEK GRACE PLUS METER Misc AS DIRECTED    albuterol (PROVENTIL HFA) 90 mcg/actuation inhaler Inhale 2 puffs into the lungs every 6 (six) hours as needed for Wheezing. Rescue    aspirin (ECOTRIN) 81 MG EC tablet Take 1 tablet (81 mg total) by mouth once daily.    blood sugar diagnostic (ACCU-CHEK GRACE PLUS TEST STRP) Strp TEST THREE TIMES A DAY    budesonide-formoterol 160-4.5 mcg (SYMBICORT) 160-4.5 mcg/actuation HFAA INHALE 2 PUFFS INTO THE LUNGS TWO TIMES A DAY. (Patient taking differently: Inhale 2 puffs into the lungs every 12 (twelve) hours. INHALE 2 PUFFS INTO THE LUNGS TWO TIMES A DAY.)    carvediloL (COREG) 12.5 MG tablet Take 2 tablets (25 mg total) by mouth 2 (two) times daily with meals.    famotidine (PEPCID) 40 MG tablet Take 40 mg by mouth once daily.    finasteride (PROSCAR) 5 mg tablet TAKE 1 TABLET BY MOUTH once daily    furosemide  "(LASIX) 40 MG tablet Take 1 tablet (40 mg total) by mouth 2 (two) times daily.    GLUCOSAMINE HCL/CHONDR ROSARIO A NA (OSTEO BI-FLEX ORAL) Take 1 tablet by mouth 2 (two) times daily.     inclisiran (LEQVIO) 284 mg/1.5 mL Syrg subcutaneous injection Inject 1.5 mLs (284 mg total) into the skin every 3 (three) months.    inclisiran (LEQVIO) 284 mg/1.5 mL Syrg subcutaneous injection Inject 1.5 mLs (284 mg total) into the skin every 6 (six) months.    JARDIANCE 25 mg tablet TAKE 1 TABLET BY MOUTH EVERY DAY    krill oil 500 mg Cap Take 1 capsule by mouth Daily.    lancets (ACCU-CHEK FASTCLIX LANCET DRUM) Misc TEST TWO TIMES A DAY    LANTUS SOLOSTAR U-100 INSULIN 100 unit/mL (3 mL) InPn pen INJECT 44 UNITS UNDER THE SKIN EVERY EVENING    levothyroxine (SYNTHROID) 300 MCG Tab Take 1 tablet (300 mcg total) by mouth every morning.    lisinopriL 10 MG tablet Take 1 tablet by mouth once daily    metFORMIN (GLUCOPHAGE-XR) 500 MG ER 24hr tablet Take 1 tablet (500 mg total) by mouth 2 (two) times daily with meals.    mirabegron (MYRBETRIQ) 25 mg Tb24 ER tablet Take 1 tablet (25 mg total) by mouth once daily.    montelukast (SINGULAIR) 10 mg tablet Take 1 tablet (10 mg total) by mouth once daily.    pen needle, diabetic (BD ULTRA-FINE MINI PEN NEEDLE) 31 gauge x 3/16" Ndle USE AS DIRECTED    propafenone (RHTHYMOL) 150 MG Tab Take 1 tablet (150 mg total) by mouth every 8 (eight) hours.    RABEprazole (ACIPHEX) 20 mg tablet Take 1 tablet (20 mg total) by mouth 2 (two) times daily.    ranolazine (RANEXA) 1,000 mg Tb12 Take 1 tablet (1,000 mg total) by mouth 2 (two) times daily.    REPATHA SURECLICK 140 mg/mL PnIj INJECT 140mg subcutaneously every 14 days (Patient taking differently: Inject 140 mg into the skin every 14 (fourteen) days.)    rivaroxaban (XARELTO) 20 mg Tab Take 20 mg by mouth daily with dinner or evening meal.    sildenafiL (VIAGRA) 100 MG tablet Take 1/2 to 1 tablet by mouth one hour prior to intercourse. Max 100mg per day "     Family History       Problem Relation (Age of Onset)    Heart disease Mother, Father, Brother          Tobacco Use    Smoking status: Former     Current packs/day: 0.00     Average packs/day: 0.5 packs/day for 4.0 years (2.0 ttl pk-yrs)     Types: Cigarettes     Start date: 1968     Quit date: 1972     Years since quittin.1    Smokeless tobacco: Former     Types: Chew     Quit date: 1976   Substance and Sexual Activity    Alcohol use: Not Currently    Drug use: No    Sexual activity: Yes     Partners: Female     Review of Systems   Constitutional:  Negative for chills, diaphoresis, fatigue and fever.   Respiratory:  Positive for chest tightness and shortness of breath.    Cardiovascular:  Negative for chest pain, palpitations and leg swelling.   Gastrointestinal:  Negative for abdominal pain, blood in stool, nausea and vomiting.        Black colored stool   Genitourinary:  Negative for difficulty urinating, dysuria and hematuria.   Neurological:  Negative for dizziness, light-headedness and headaches.   All other systems reviewed and are negative.    Objective:     Vital Signs (Most Recent):  Temp: 97.9 °F (36.6 °C) (24 0352)  Pulse: 76 (24 0415)  Resp: 16 (24 0352)  BP: 126/62 (24 0352)  SpO2: 97 % (24 035) Vital Signs (24h Range):  Temp:  [97.9 °F (36.6 °C)-98.7 °F (37.1 °C)] 97.9 °F (36.6 °C)  Pulse:  [65-78] 76  Resp:  [16-20] 16  SpO2:  [97 %-100 %] 97 %  BP: (122-143)/(60-75) 126/62     Weight: 103 kg (227 lb 1.2 oz)  Body mass index is 35.56 kg/m².     Physical Exam  Vitals and nursing note reviewed.   Constitutional:       General: He is awake. He is not in acute distress.     Appearance: Normal appearance. He is well-developed and well-groomed. He is not ill-appearing, toxic-appearing or diaphoretic.   HENT:      Head: Normocephalic and atraumatic.   Eyes:      Extraocular Movements: Extraocular movements intact.      Conjunctiva/sclera: Conjunctivae  normal.      Pupils: Pupils are equal, round, and reactive to light.   Cardiovascular:      Rate and Rhythm: Normal rate and regular rhythm.      Pulses: Normal pulses.      Heart sounds: Murmur heard.   Pulmonary:      Effort: Pulmonary effort is normal. No respiratory distress.      Breath sounds: Normal breath sounds. No decreased breath sounds, wheezing, rhonchi or rales.   Abdominal:      General: Bowel sounds are normal.      Palpations: Abdomen is soft.      Tenderness: There is no abdominal tenderness.   Musculoskeletal:      Cervical back: Normal range of motion and neck supple.      Right lower leg: No edema.      Left lower leg: No edema.      Comments: 5/5 strength throughout   Skin:     General: Skin is warm and dry.      Capillary Refill: Capillary refill takes less than 2 seconds.   Neurological:      General: No focal deficit present.      Mental Status: He is alert and oriented to person, place, and time. Mental status is at baseline.      GCS: GCS eye subscore is 4. GCS verbal subscore is 5. GCS motor subscore is 6.      Cranial Nerves: Cranial nerves 2-12 are intact.      Sensory: Sensation is intact.      Motor: Motor function is intact.   Psychiatric:         Mood and Affect: Mood normal.         Behavior: Behavior normal. Behavior is cooperative.              CRANIAL NERVES     CN III, IV, VI   Pupils are equal, round, and reactive to light.     LABS:  Recent Results (from the past 24 hour(s))   CBC auto differential    Collection Time: 07/24/24  9:52 PM   Result Value Ref Range    WBC 8.10 3.90 - 12.70 K/uL    RBC 2.76 (L) 4.60 - 6.20 M/uL    Hemoglobin 7.5 (L) 14.0 - 18.0 g/dL    Hematocrit 24.7 (L) 40.0 - 54.0 %    MCV 90 82 - 98 fL    MCH 27.2 27.0 - 31.0 pg    MCHC 30.4 (L) 32.0 - 36.0 g/dL    RDW 22.3 (H) 11.5 - 14.5 %    Platelets 189 150 - 450 K/uL    MPV 10.1 9.2 - 12.9 fL    Immature Granulocytes 1.0 (H) 0.0 - 0.5 %    Gran # (ANC) 5.8 1.8 - 7.7 K/uL    Immature Grans (Abs) 0.08 (H)  0.00 - 0.04 K/uL    Lymph # 1.5 1.0 - 4.8 K/uL    Mono # 0.7 0.3 - 1.0 K/uL    Eos # 0.0 0.0 - 0.5 K/uL    Baso # 0.02 0.00 - 0.20 K/uL    nRBC 1 (A) 0 /100 WBC    Gran % 71.2 38.0 - 73.0 %    Lymph % 19.0 18.0 - 48.0 %    Mono % 8.1 4.0 - 15.0 %    Eosinophil % 0.5 0.0 - 8.0 %    Basophil % 0.2 0.0 - 1.9 %    Differential Method Automated    Comprehensive metabolic panel    Collection Time: 07/24/24  9:52 PM   Result Value Ref Range    Sodium 137 136 - 145 mmol/L    Potassium 3.6 3.5 - 5.1 mmol/L    Chloride 104 95 - 110 mmol/L    CO2 24 23 - 29 mmol/L    Glucose 132 (H) 70 - 110 mg/dL    BUN 28 (H) 8 - 23 mg/dL    Creatinine 1.5 (H) 0.5 - 1.4 mg/dL    Calcium 9.2 8.7 - 10.5 mg/dL    Total Protein 6.9 6.0 - 8.4 g/dL    Albumin 3.7 3.5 - 5.2 g/dL    Total Bilirubin 0.2 0.1 - 1.0 mg/dL    Alkaline Phosphatase 63 55 - 135 U/L    AST 16 10 - 40 U/L    ALT 14 10 - 44 U/L    eGFR 47 (A) >60 mL/min/1.73 m^2    Anion Gap 9 8 - 16 mmol/L   Troponin I #1    Collection Time: 07/24/24  9:52 PM   Result Value Ref Range    Troponin I 0.024 0.000 - 0.026 ng/mL   BNP    Collection Time: 07/24/24  9:52 PM   Result Value Ref Range     (H) 0 - 99 pg/mL   Protime-INR    Collection Time: 07/24/24  9:52 PM   Result Value Ref Range    Prothrombin Time 14.6 (H) 9.0 - 12.5 sec    INR 1.4 (H) 0.8 - 1.2   APTT    Collection Time: 07/24/24  9:52 PM   Result Value Ref Range    aPTT 33.3 (H) 21.0 - 32.0 sec   Type & Screen    Collection Time: 07/24/24  9:52 PM   Result Value Ref Range    Group & Rh O POS     Indirect Rock NEG     Specimen Outdate 07/27/2024 23:59    Prepare RBC 1 Unit    Collection Time: 07/24/24  9:52 PM   Result Value Ref Range    UNIT NUMBER [id number]     Product Code U9380R88     DISPENSE STATUS ISSUED     CODING SYSTEM NVEH319     Unit Blood Type Code 5100     Unit Blood Type O POS     Unit Expiration [id number]     CROSSMATCH INTERPRETATION Compatible    Occult blood x 1, stool    Collection Time: 07/24/24  11:35 PM    Specimen: Stool   Result Value Ref Range    Occult Blood Positive (A) Negative       RADIOLOGY  X-Ray Chest AP Portable    Result Date: 7/24/2024  EXAM: XR CHEST AP PORTABLE CLINICAL HISTORY: Chest pain PRIOR: 05/10/2024 FINDINGS:  Hazy opacity left midlung similar prior given technique differences.  No new pulmonary consolidation or pleural effusion IMPRESSION: Stable chest exam Finalized on: 7/24/2024 10:42 PM By:  Eliz Mcbride MD RG# [id number]      2024-07-24 22:44:26.083    [facility]    X-Ray Chest 1 View    Result Date: 7/15/2024  EXAMINATION: XR CHEST 1 VIEW CLINICAL HISTORY: shortness of breath; TECHNIQUE: Single frontal view of the chest was performed. COMPARISON: None FINDINGS: Median sternotomy wires in place. The lungs are clear, with normal appearance of pulmonary vasculature and no pleural effusion or pneumothorax. The cardiac silhouette is normal in size. The hilar and mediastinal contours are unremarkable. Bones are intact.     No acute abnormality. Electronically signed by: Darrell Iglesias Date:    07/15/2024 Time:    12:25      EKG    MICROBIOLOGY    MDM     Amount and/or Complexity of Data Reviewed  Clinical lab tests: reviewed  Tests in the radiology section of CPT®: reviewed  Tests in the medicine section of CPT®: reviewed  Discussion of test results with the performing providers: yes  Decide to obtain previous medical records or to obtain history from someone other than the patient: yes  Obtain history from someone other than the patient: yes  Review and summarize past medical records: yes  Discuss the patient with other providers: yes  Independent visualization of images, tracings, or specimens: yes        Assessment/Plan:     * Symptomatic anemia  Anemia is likely due to chronic blood loss and Iron deficiency. Most recent hemoglobin and hematocrit are listed below.  Recent Labs     07/24/24  2152 07/25/24  0518   HGB 7.5* 8.6*   HCT 24.7* 28.1*     Plan  - Monitor serial CBC: Every 12  "hours  - Transfuse PRBC if patient becomes hemodynamically unstable, symptomatic or H/H drops below 7/21.  - Patient Has received 1 units of PRBCs on 7/24/24  - Patient's anemia is currently  being treated      Melena  Hx of GI bleed with no identifiable source.  Takes aspirin and Xarelto daily.  Recent iron infusion on 07/23/2024. Receiving 1 unit PRBCs currently.  Plan:  -repeat cbc in am  -Hold ASA and Xarelto  -GI consult  -NPO    LESTER (acute kidney injury)  LESTER is likely due to pre-renal azotemia due to dehydration. Baseline creatinine is  1.2 . Most recent creatinine and eGFR are listed below.  Recent Labs     07/24/24 2152   CREATININE 1.5*   EGFRNORACEVR 47*      Plan  - LESTER is  being treated  - Avoid nephrotoxins and renally dose meds for GFR listed above  - Monitor urine output, serial BMP, and adjust therapy as needed      Type 2 diabetes mellitus with microalbuminuria, with long-term current use of insulin  Patient's FSGs are controlled on current medication regimen.  Last A1c reviewed-   Lab Results   Component Value Date    HGBA1C 5.3 04/09/2024     Most recent fingerstick glucose reviewed- No results for input(s): "POCTGLUCOSE" in the last 24 hours.  Current correctional scale  Low  Maintain anti-hyperglycemic dose as follows-   Antihyperglycemics (From admission, onward)      Start     Stop Route Frequency Ordered    07/25/24 0226  insulin aspart U-100 pen 0-5 Units         -- SubQ Every 6 hours PRN 07/25/24 0126          Plan:  -SSI  -A1c  -Accu-checks  -Hold oral hypoglycemics while patient is in the hospital  -Continue home long-acting insulin at 20% decrease, titrate up as needed  -Hypoglycemic protocol          Iron deficiency anemia due to chronic blood loss  Anemia is likely due to chronic blood loss and Iron deficiency. Most recent hemoglobin and hematocrit are listed below.  Recent Labs     07/24/24 2152   HGB 7.5*   HCT 24.7*     Plan  - Monitor serial CBC: Every 12 hours  - Transfuse PRBC " if patient becomes hemodynamically unstable, symptomatic or H/H drops below 7/21.  - Patient Has received 1 units of PRBCs on 7/24/24  - Patient's anemia is currently  being treated      Essential hypertension  Chronic, controlled. Latest blood pressure and vitals reviewed-     Temp:  [97.9 °F (36.6 °C)-98.7 °F (37.1 °C)]   Pulse:  [65-78]   Resp:  [16-20]   BP: (122-143)/(60-75)   SpO2:  [97 %-100 %] .   Home meds for hypertension were reviewed and noted below.   Hypertension Medications               carvediloL (COREG) 12.5 MG tablet Take 2 tablets (25 mg total) by mouth 2 (two) times daily with meals.    furosemide (LASIX) 40 MG tablet Take 1 tablet (40 mg total) by mouth 2 (two) times daily.    lisinopriL 10 MG tablet Take 1 tablet by mouth once daily            While in the hospital, will manage blood pressure as follows; Continue home antihypertensive regimen    Will utilize p.r.n. blood pressure medication only if patient's blood pressure greater than 160/100 and he develops symptoms such as worsening chest pain or shortness of breath.    Acquired hypothyroidism  Patient has chronic hypothyroidism. TFTs reviewed-   Lab Results   Component Value Date    TSH 1.151 05/28/2024   . Will continue chronic levothyroxine and adjust for and clinical changes.        Coronary artery disease involving native coronary artery of native heart without angina pectoris  Patient with known CAD s/p CABG, which is controlled Will continue xarelto, ASA, and monitor for S/Sx of angina/ACS. Continue to monitor on telemetry.     Benign prostatic hyperplasia with nocturia  Plan:  -continue proscar and myrbetriq      GERD (gastroesophageal reflux disease)  Chronic. Stable. Currently asymptomatic. Home medications include PPI/Antacids as needed.  Plan:  -Continue PPI/Antacids as needed     A-Fib      Plan:      -Continue Rythmol      -Hold ASA and Xarelto    VTE Risk Mitigation (From admission, onward)           Ordered     IP VTE HIGH  RISK PATIENT  Once         07/25/24 0124     Place sequential compression device  Until discontinued         07/25/24 0124     Reason for No Pharmacological VTE Prophylaxis  Once        Question:  Reasons:  Answer:  Active Bleeding    07/25/24 0124                     //Core Measures   -DVT proph: SCDs, withholding anticoagulation due to active bleeding  -Code status Full    -Surrogate:none present    Components of this note were documented using a voice recognition system and are subject to errors not corrected at the time the document was proof read. Please contact the author for any clarifications.       Og Kaufman NP  Department of Hospital Medicine  [facility] Tele[facility] ([facility])

## 2024-07-25 NOTE — ASSESSMENT & PLAN NOTE
Repeat INR tomorrow.   Monitor neuro status.   No clinical evidence to suggest variceal bleeding.     MELD 3.0: 11 at 7/25/2024  5:18 AM  MELD-Na: 12 at 7/25/2024  5:18 AM  Calculated from:  Serum Creatinine: 1.2 mg/dL at 7/25/2024  5:18 AM  Serum Sodium: 141 mmol/L (Using max of 137 mmol/L) at 7/25/2024  5:18 AM  Total Bilirubin: 0.2 mg/dL (Using min of 1 mg/dL) at 7/24/2024  9:52 PM  Serum Albumin: 3.7 g/dL (Using max of 3.5 g/dL) at 7/24/2024  9:52 PM  INR(ratio): 1.4 at 7/24/2024  9:52 PM  Age at listing (hypothetical): 79 years  Sex: Male at 7/25/2024  5:18 AM

## 2024-07-25 NOTE — ASSESSMENT & PLAN NOTE
Hx of GI bleed with no identifiable source.  Takes aspirin and Xarelto daily.  Recent iron infusion on 07/23/2024. Receiving 1 unit PRBCs currently.  Plan:  -repeat cbc in am  -Hold ASA and Xarelto  -GI consult  -NPO

## 2024-07-25 NOTE — ASSESSMENT & PLAN NOTE
Patient has chronic hypothyroidism. TFTs reviewed-   Lab Results   Component Value Date    TSH 1.151 05/28/2024   . Will continue chronic levothyroxine and adjust for and clinical changes.

## 2024-07-25 NOTE — HPI
The patient presented to the ER for SOB and CP. He has a history of CAD status post CABG 2018 and Afib. He was admitted for the same last week. Cardiology saw him, he was treated symptomatically and discharged. He says this episode came about ten minutes into weed eating. The pain and SOB improved after he stopped and rested. He said he felt fine earlier the same day while at physical therapy and riding the bike. In the ER, he was noted to have a Hgb of 7.5. He has a history of chronic anemia with a baseline Hgb around 9. It is 8.6 today after a unit of blood. He reports having black stools. PLT normal. INR 1.4. BUN 28 and creatinine 1.5. He's had an extensive GI evaluation with multiple EGDs, a colonoscopy and VCE. He had SMA stenting 01/05/2024 after coloscopy revealed ulcers. 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 He is on Xarelto and ASA at home. He received an iron infusion earlier this week. He is currently hemodynamically stable. He denies nausea, vomiting or abdominal pain.     EGD (04/2024): gastritis and erythematous gastropathy.    Colonoscopy (12/2023): colon polyps and ischemic ulcers.     VCE (2/2024): normal.

## 2024-07-25 NOTE — PLAN OF CARE
Problem: Adult Inpatient Plan of Care  Goal: Plan of Care Review  Reactivated  Goal: Patient-Specific Goal (Individualized)  Reactivated  Goal: Absence of Hospital-Acquired Illness or Injury  Reactivated  Goal: Optimal Comfort and Wellbeing  Reactivated  Goal: Readiness for Transition of Care  Reactivated     Problem: Diabetes Comorbidity  Goal: Blood Glucose Level Within Targeted Range  Reactivated     Problem: Wound  Goal: Optimal Coping  Reactivated  Goal: Optimal Functional Ability  Reactivated  Goal: Absence of Infection Signs and Symptoms  Reactivated  Goal: Improved Oral Intake  Reactivated  Goal: Optimal Pain Control and Function  Reactivated  Goal: Skin Health and Integrity  Reactivated  Goal: Optimal Wound Healing  Reactivated     Problem: Acute Kidney Injury/Impairment  Goal: Fluid and Electrolyte Balance  Reactivated  Goal: Improved Oral Intake  Reactivated  Goal: Effective Renal Function  Reactivated

## 2024-07-25 NOTE — H&P (VIEW-ONLY)
[facility] ([facility])  Gastroenterology  Consult Note    Patient Name: Erendira Pretty  MRN: [id number]  Admission Date: 7/24/2024  Hospital Length of Stay: 0 days  Code Status: Full Code   Attending Provider: Magalis Machado MD   Consulting Provider: Phi Cordero PA-C  Primary Care Physician: Bhupinder Callaway MD  Principal Problem:Symptomatic anemia    Inpatient consult to Gastroenterology  Consult performed by: Phi Cordero PA-C  Consult ordered by: Og Kaufman NP  Reason for consult: GI bleed; symptomatic anemia        Subjective:     HPI:  The patient presented to the ER for SOB and CP. He has a history of CAD status post CABG 2018 and Afib. He was admitted for the same last week. Cardiology saw him, he was treated symptomatically and discharged. He says this episode came about ten minutes into weed eating. The pain and SOB improved after he stopped and rested. He said he felt fine earlier the same day while at physical therapy and riding the bike. In the ER, he was noted to have a Hgb of 7.5. He has a history of chronic anemia with a baseline Hgb around 9. It is 8.6 today after a unit of blood. He reports having black stools. PLT normal. INR 1.4. BUN 28 and creatinine 1.5. He's had an extensive GI evaluation with multiple EGDs, a colonoscopy and VCE. He had SMA stenting 01/05/2024 after coloscopy revealed ulcers. 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 He is on Xarelto and ASA at home. He received an iron infusion earlier this week. He is currently hemodynamically stable. He denies nausea, vomiting or abdominal pain.     EGD (04/2024): gastritis and erythematous gastropathy.    Colonoscopy (12/2023): colon polyps and ischemic ulcers.     VCE (2/2024): normal.     Past Medical History:   Diagnosis Date    Anticoagulant long-term use     Aortic stenosis     Asthma     Benign prostatic hyperplasia with  nocturia     Bilateral carotid artery stenosis 07/21/2021    US CAROTID BILATERAL 07/14/2021 IMPRESSION: Atherosclerosis with suspected stenosis greater than 50% at the right proximal ICA visually. Velocities are discordant and appear improved from prior. Atherosclerosis on the left with no evidence of flow-limiting stenosis greater than 50%.  FINDINGS: Right: Internal Carotid Artery (ICA): Peak systolic velocity 118 cm/sec. 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 09/22/2020    DELONTE on CPAP     Osteoarthritis     Paroxysmal atrial fibrillation     PVD (peripheral vascular disease)     Secondary esophageal varices without bleeding 06/26/2017    Type 2 diabetes mellitus        Past Surgical History:   Procedure Laterality Date    ABLATION OF ARRHYTHMOGENIC FOCUS FOR ATRIAL FIBRILLATION N/A 6/24/2024    Procedure: Ablation atrial fibrillation;  Surgeon: Horacio Huerta MD;  Location: [facility] EP LAB;  Service: Cardiology;  Laterality: N/A;  afib, PVI, RFA, BERNARD (cx if SR), ZIA, anes, MB, 3 Prep, 3 hours per Dr. Huerta    ANGIOGRAM, EXTREMITY, UNILATERAL Left 1/4/2024    Procedure: ANGIOGRAM, EXTREMITY, UNILATERAL- Femoral / SMS Stent, Poss General;  Surgeon: ALEX Alfred III, MD;  Location: [facility] OR [address] [place] [address];  Service: Vascular;  Laterality: Left;  mGy : 2698.78  Gy.cm : 229.88  Contrast : 62ml  Fluro time : 13.8min    CARDIAC CATHETERIZATION      CARDIAC SURGERY      CARPAL TUNNEL RELEASE Left     CATARACT EXTRACTION      CATARACT  EXTRACTION W/ INTRAOCULAR LENS  IMPLANT, BILATERAL  2008    CATHETERIZATION OF BOTH LEFT AND RIGHT HEART N/A 11/2/2018    Procedure: CATHETERIZATION, HEART, BOTH LEFT AND RIGHT;  Surgeon: Frank Gallardo MD;  Location: [facility] CATH LAB;  Service: Cardiology;  Laterality: N/A;    COLONOSCOPY  8/2013    COLONOSCOPY N/A 5/30/2016    Procedure: COLONOSCOPY;  Surgeon: Anna Tomas MD;  Location: [facility] ENDO;  Service: Endoscopy;  Laterality: N/A;    COLONOSCOPY N/A 7/17/2019    Procedure: COLONOSCOPY;  Surgeon: David Carter III, MD;  Location: [facility] ENDO;  Service: Endoscopy;  Laterality: N/A;    COLONOSCOPY N/A 12/14/2023    Procedure: COLONOSCOPY;  Surgeon: Ama Barrera MD;  Location: [facility] ENDO;  Service: Endoscopy;  Laterality: N/A;    COMPUTED TOMOGRAPHY N/A 9/9/2019    Procedure: CT (COMPUTED TOMOGRAPHY);  Surgeon: [facility];  Location: [facility] OR;  Service: General;  Laterality: N/A;  Microwave ablation to be done in CT.  Need CRNA and cart    COMPUTED TOMOGRAPHY N/A 1/25/2022    Procedure: Liver Microwave Ablation;  Surgeon: Red Puentes MD;  Location: [facility];  Service: General;  Laterality: N/A;    CORONARY ARTERY BYPASS GRAFT (CABG) N/A 11/5/2018    Procedure: CORONARY ARTERY BYPASS GRAFT (CABG);  Surgeon: Bear De Luna MD;  Location: [facility] OR;  Service: Cardiothoracic;  Laterality: N/A;  TWO VESSEL BYPASS WITH AORTOTOMY AND EXCISION OF ATHEROSCLEROTIC PLAQUE    CORONARY BYPASS GRAFT ANGIOGRAPHY  3/2/2020    Procedure: Bypass graft study;  Surgeon: Cora Cormier MD;  Location: [facility] CATH LAB;  Service: Cardiology;;    ECHOCARDIOGRAM,TRANSESOPHAGEAL N/A 6/24/2024    Procedure: Transesophageal echo (BERNARD) intra-procedure log documentation;  Surgeon: Nacho Downs MD;  Location: [facility] EP LAB;  Service: Cardiology;  Laterality: N/A;    ELBOW BURSA SURGERY Right 2010    ENDOSCOPIC HARVEST OF VEIN Left 11/5/2018    Procedure: SURGICAL PROCUREMENT, VEIN, ENDOSCOPIC;  Surgeon: Bear ELAM  MD Annamarie;  Location: [facility] OR;  Service: Cardiothoracic;  Laterality: Left;    ESOPHAGOGASTRODUODENOSCOPY N/A 7/17/2019    Procedure: ESOPHAGOGASTRODUODENOSCOPY (EGD);  Surgeon: David Carter III, MD;  Location: [facility] ENDO;  Service: Endoscopy;  Laterality: N/A;    ESOPHAGOGASTRODUODENOSCOPY N/A 12/29/2022    Procedure: ESOPHAGOGASTRODUODENOSCOPY (EGD);  Surgeon: Issa Gayle MD;  Location: [facility];  Service: Endoscopy;  Laterality: N/A;    ESOPHAGOGASTRODUODENOSCOPY N/A 1/17/2024    Procedure: EGD (ESOPHAGOGASTRODUODENOSCOPY);  Surgeon: Issa Gayle MD;  Location: [facility];  Service: Endoscopy;  Laterality: N/A;    ESOPHAGOGASTRODUODENOSCOPY N/A 4/30/2024    Procedure: EGD (ESOPHAGOGASTRODUODENOSCOPY);  Surgeon: Eder Foley MD;  Location: [facility];  Service: Gastroenterology;  Laterality: N/A;    ETHMOIDECTOMY Bilateral 3/27/2019    Procedure: ETHMOIDECTOMY;  Surgeon: Alejandro Parkinson MD;  Location: [facility] OR;  Service: ENT;  Laterality: Bilateral;    EYE SURGERY      FOOT SURGERY      FRONTAL SINUS OBLITERATION Bilateral 3/27/2019    Procedure: SINUSOTOMY, FRONTAL SINUS, OBLITERATIVE;  Surgeon: Alejandro Parkinson MD;  Location: [facility] OR;  Service: ENT;  Laterality: Bilateral;    FUNCTIONAL ENDOSCOPIC SINUS SURGERY (FESS) Bilateral 3/27/2019    Procedure: FESS (FUNCTIONAL ENDOSCOPIC SINUS SURGERY);  Surgeon: Alejandro Parkinson MD;  Location: [facility] OR;  Service: ENT;  Laterality: Bilateral;  Left Keila bullosa resection     INTRALUMINAL GASTROINTESTINAL TRACT IMAGING VIA CAPSULE N/A 2/2/2024    Procedure: IMAGING PROCEDURE, GI TRACT, INTRALUMINAL, VIA CAPSULE;  Surgeon: Cooper Estrella RN;  Location: [facility];  Service: Endoscopy;  Laterality: N/A;    KNEE ARTHROSCOPY W/ MENISCAL REPAIR Left 06/2019    dr boykin    KNEE SURGERY Right     LEFT HEART CATHETERIZATION Left 3/2/2020    Procedure: CATHETERIZATION, HEART, LEFT;  Surgeon: Cora Cormier MD;  Location: [facility] CATH LAB;  Service: Cardiology;   Laterality: Left;    LIVER BIOPSY  2022    MAXILLARY ANTROSTOMY Bilateral 3/27/2019    Procedure: MAXILLARY ANTROSTOMY;  Surgeon: Alejandro Parkinson MD;  Location: [facility] OR;  Service: ENT;  Laterality: Bilateral;    NASAL SEPTOPLASTY N/A 3/27/2019    Procedure: SEPTOPLASTY, NOSE;  Surgeon: Alejandro Parkinson MD;  Location: [facility] OR;  Service: ENT;  Laterality: N/A;    RECTAL SURGERY  2012    STENT, SUPERIOR MESENTERIC ARTERY Left 2024    Procedure: STENT, SUPERIOR MESENTERIC ARTERY;  Surgeon: ALEX Alfred III, MD;  Location: [facility] OR [address] [place] [address];  Service: Vascular;  Laterality: Left;    TRANSURETHRAL RESECTION OF PROSTATE (TURP) WITHOUT USE OF LASER N/A 2018    Procedure: TURP, WITHOUT USING LASER;  Surgeon: Cooper Cordova IV, MD;  Location: [facility] OR;  Service: Urology;  Laterality: N/A;    TRIGGER FINGER RELEASE Left        Review of patient's allergies indicates:   Allergen Reactions    Lipitor [atorvastatin] Other (See Comments)     Muscle aches and pains as well as fatigue.     Niacin preparations Other (See Comments)     Causes broken blood vessels and bruises at 4 times normal dose.    Statins-hmg-coa reductase inhibitors Other (See Comments)     Statins cause intolerable myalgias.    Iodinated contrast media Hives     Tachycardia    Omeprazole Hives and Itching    Prilosec [omeprazole magnesium] Hives and Itching     Family History       Problem Relation (Age of Onset)    Heart disease Mother, Father, Brother          Tobacco Use    Smoking status: Former     Current packs/day: 0.00     Average packs/day: 0.5 packs/day for 4.0 years (2.0 ttl pk-yrs)     Types: Cigarettes     Start date: 1968     Quit date: 1972     Years since quittin.1    Smokeless tobacco: Former     Types: Chew     Quit date: 1976   Substance and Sexual Activity    Alcohol use: Not Currently    Drug use: No    Sexual activity: Yes     Partners: Female     Review of Systems   Constitutional:  Negative for fever.   HENT:   Negative for hearing loss.    Eyes:  Negative for visual disturbance.   Respiratory:  Positive for shortness of breath. Negative for cough.    Cardiovascular:  Positive for chest pain. Negative for palpitations.   Gastrointestinal:         As per HPI.   Genitourinary:  Negative for difficulty urinating, dysuria, frequency and hematuria.   Musculoskeletal:  Negative for arthralgias and back pain.   Skin:  Negative for color change and rash.   Neurological:  Negative for seizures, syncope, weakness, numbness and headaches.   Hematological:  Does not bruise/bleed easily.   Psychiatric/Behavioral:  The patient is not nervous/anxious.      Objective:     Vital Signs (Most Recent):  Temp: 97.9 °F (36.6 °C) (07/25/24 0759)  Pulse: 91 (07/25/24 0841)  Resp: 18 (07/25/24 0759)  BP: 133/60 (07/25/24 0759)  SpO2: 96 % (07/25/24 0759) Vital Signs (24h Range):  Temp:  [97.9 °F (36.6 °C)-98.7 °F (37.1 °C)] 97.9 °F (36.6 °C)  Pulse:  [65-91] 91  Resp:  [16-20] 18  SpO2:  [96 %-100 %] 96 %  BP: (122-143)/(60-75) 133/60     Weight: 103 kg (227 lb 1.2 oz) (07/25/24 0845)  Body mass index is 35.56 kg/m².      Intake/Output Summary (Last 24 hours) at 7/25/2024 1003  Last data filed at 7/25/2024 0241  Gross per 24 hour   Intake 292.08 ml   Output --   Net 292.08 ml       Lines/Drains/Airways       Peripheral Intravenous Line  Duration                  Peripheral IV - Single Lumen 07/24/24 2338 20 G Anterior;Right Forearm <1 day                     Physical Exam  Constitutional:       General: He is not in acute distress.     Appearance: Normal appearance. He is well-developed.   HENT:      Head: Normocephalic and atraumatic.   Eyes:      Extraocular Movements: Extraocular movements intact.   Cardiovascular:      Rate and Rhythm: Normal rate and regular rhythm.      Heart sounds: Murmur heard.   Pulmonary:      Effort: Pulmonary effort is normal. No respiratory distress.      Breath sounds: Normal breath sounds. No wheezing.   Abdominal:       General: Bowel sounds are normal. There is no distension.      Palpations: Abdomen is soft. There is no mass.      Tenderness: There is no abdominal tenderness.   Musculoskeletal:      Cervical back: Normal range of motion and neck supple.      Right lower leg: Edema present.      Left lower leg: Edema present.   Skin:     General: Skin is warm and dry.      Findings: No rash.   Neurological:      Mental Status: He is alert and oriented to person, place, and time.      Cranial Nerves: No cranial nerve deficit.   Psychiatric:         Behavior: Behavior normal.          Significant Labs:  CBC:   Recent Labs   Lab 07/24/24 2152 07/25/24 0518   WBC 8.10 6.66   HGB 7.5* 8.6*   HCT 24.7* 28.1*    171     CMP:   Recent Labs   Lab 07/24/24 2152 07/25/24 0518   * 88   CALCIUM 9.2 8.8   ALBUMIN 3.7  --    PROT 6.9  --     141   K 3.6 4.1   CO2 24 25    106   BUN 28* 26*   CREATININE 1.5* 1.2   ALKPHOS 63  --    ALT 14  --    AST 16  --    BILITOT 0.2  --      Coagulation:   Recent Labs   Lab 07/24/24 2152   INR 1.4*   APTT 33.3*       Significant Imaging:  Imaging results within the past 24 hours have been reviewed.  Assessment/Plan:     Cardiac/Vascular  Paroxysmal atrial fibrillation  Xarelto on hold. Last dose 07/24/24 PM.     Oncology  * Symptomatic anemia  Acute on chronic. Likely multifactorial.   S/p iron infusion 07/23/24.  Will plan on EGD and colonoscopy (h/o ulcers) after Xarelto held two days. Last dose 04/24/24 PM.  Start Protonix IV bid.    Start clear liquid diet tomorrow.   Mag Citrate today and Golytely tomorrow.   Monitor H/H and transfuse additional units as needed.     Iron deficiency anemia due to chronic blood loss  Last iron infusion two days ago.     GI  Compensated HCV cirrhosis  Repeat INR tomorrow.   Monitor neuro status.   No clinical evidence to suggest variceal bleeding.     MELD 3.0: 11 at 7/25/2024  5:18 AM  MELD-Na: 12 at 7/25/2024  5:18 AM  Calculated  from:  Serum Creatinine: 1.2 mg/dL at 7/25/2024  5:18 AM  Serum Sodium: 141 mmol/L (Using max of 137 mmol/L) at 7/25/2024  5:18 AM  Total Bilirubin: 0.2 mg/dL (Using min of 1 mg/dL) at 7/24/2024  9:52 PM  Serum Albumin: 3.7 g/dL (Using max of 3.5 g/dL) at 7/24/2024  9:52 PM  INR(ratio): 1.4 at 7/24/2024  9:52 PM  Age at listing (hypothetical): 79 years  Sex: Male at 7/25/2024  5:18 AM    Melena  Patient reports black stools. Noted to have a decrease in Hgb.   Will plan for EGD and colonoscopy after Xarelto held 48 hours.       Thank you for your consult. I will follow-up with patient. Please contact us if you have any additional questions.    Phi Cordero PA-C  Gastroenterology  [facility] - Telemetry ([facility])

## 2024-07-25 NOTE — ASSESSMENT & PLAN NOTE
Chronic, controlled. Latest blood pressure and vitals reviewed-     Temp:  [97.9 °F (36.6 °C)-98.7 °F (37.1 °C)]   Pulse:  [65-78]   Resp:  [16-20]   BP: (122-143)/(60-75)   SpO2:  [97 %-100 %] .   Home meds for hypertension were reviewed and noted below.   Hypertension Medications               carvediloL (COREG) 12.5 MG tablet Take 2 tablets (25 mg total) by mouth 2 (two) times daily with meals.    furosemide (LASIX) 40 MG tablet Take 1 tablet (40 mg total) by mouth 2 (two) times daily.    lisinopriL 10 MG tablet Take 1 tablet by mouth once daily            While in the hospital, will manage blood pressure as follows; Continue home antihypertensive regimen    Will utilize p.r.n. blood pressure medication only if patient's blood pressure greater than 160/100 and he develops symptoms such as worsening chest pain or shortness of breath.

## 2024-07-25 NOTE — CONSULTS
[facility] - Tele[facility] ([facility])  Gastroenterology  Consult Note    Patient Name: Erendira Pretty  MRN: [id number]  Admission Date: 7/24/2024  Hospital Length of Stay: 0 days  Code Status: Full Code   Attending Provider: Magalis Machado MD   Consulting Provider: Phi Cordero PA-C  Primary Care Physician: Bhupinder Callaway MD  Principal Problem:Symptomatic anemia    Inpatient consult to Gastroenterology  Consult performed by: Phi Cordero PA-C  Consult ordered by: Og Kaufman NP  Reason for consult: GI bleed; symptomatic anemia        Subjective:     HPI:  The patient presented to the ER for SOB and CP. He has a history of CAD status post CABG 2018 and Afib. He was admitted for the same last week. Cardiology saw him, he was treated symptomatically and discharged. He says this episode came about ten minutes into weed eating. The pain and SOB improved after he stopped and rested. He said he felt fine earlier the same day while at physical therapy and riding the bike. In the ER, he was noted to have a Hgb of 7.5. He has a history of chronic anemia with a baseline Hgb around 9. It is 8.6 today after a unit of blood. He reports having black stools. PLT normal. INR 1.4. BUN 28 and creatinine 1.5. He's had an extensive GI evaluation with multiple EGDs, a colonoscopy and VCE. He had SMA stenting 01/05/2024 after coloscopy revealed ulcers. 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 He is on Xarelto and ASA at home. He received an iron infusion earlier this week. He is currently hemodynamically stable. He denies nausea, vomiting or abdominal pain.     EGD (04/2024): gastritis and erythematous gastropathy.    Colonoscopy (12/2023): colon polyps and ischemic ulcers.     VCE (2/2024): normal.     Past Medical History:   Diagnosis Date    Anticoagulant long-term use     Aortic stenosis     Asthma     Benign prostatic hyperplasia with  nocturia     Bilateral carotid artery stenosis 07/21/2021    US CAROTID BILATERAL 07/14/2021 IMPRESSION: Atherosclerosis with suspected stenosis greater than 50% at the right proximal ICA visually. Velocities are discordant and appear improved from prior. Atherosclerosis on the left with no evidence of flow-limiting stenosis greater than 50%.  FINDINGS: Right: Internal Carotid Artery (ICA): Peak systolic velocity 118 cm/sec. 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 09/22/2020    DELONTE on CPAP     Osteoarthritis     Paroxysmal atrial fibrillation     PVD (peripheral vascular disease)     Secondary esophageal varices without bleeding 06/26/2017    Type 2 diabetes mellitus        Past Surgical History:   Procedure Laterality Date    ABLATION OF ARRHYTHMOGENIC FOCUS FOR ATRIAL FIBRILLATION N/A 6/24/2024    Procedure: Ablation atrial fibrillation;  Surgeon: Horacio Huerta MD;  Location: [facility] EP LAB;  Service: Cardiology;  Laterality: N/A;  afib, PVI, RFA, BERNARD (cx if SR), ZIA, anes, MB, 3 Prep, 3 hours per Dr. Huerta    ANGIOGRAM, EXTREMITY, UNILATERAL Left 1/4/2024    Procedure: ANGIOGRAM, EXTREMITY, UNILATERAL- Femoral / SMS Stent, Poss General;  Surgeon: ALEX Alfred III, MD;  Location: [facility] OR [address] [place] [address];  Service: Vascular;  Laterality: Left;  mGy : 2698.78  Gy.cm : 229.88  Contrast : 62ml  Fluro time : 13.8min    CARDIAC CATHETERIZATION      CARDIAC SURGERY      CARPAL TUNNEL RELEASE Left     CATARACT EXTRACTION      CATARACT  EXTRACTION W/ INTRAOCULAR LENS  IMPLANT, BILATERAL  2008    CATHETERIZATION OF BOTH LEFT AND RIGHT HEART N/A 11/2/2018    Procedure: CATHETERIZATION, HEART, BOTH LEFT AND RIGHT;  Surgeon: Frank Gallardo MD;  Location: [facility] CATH LAB;  Service: Cardiology;  Laterality: N/A;    COLONOSCOPY  8/2013    COLONOSCOPY N/A 5/30/2016    Procedure: COLONOSCOPY;  Surgeon: Anna Tomas MD;  Location: [facility] ENDO;  Service: Endoscopy;  Laterality: N/A;    COLONOSCOPY N/A 7/17/2019    Procedure: COLONOSCOPY;  Surgeon: David Carter III, MD;  Location: [facility] ENDO;  Service: Endoscopy;  Laterality: N/A;    COLONOSCOPY N/A 12/14/2023    Procedure: COLONOSCOPY;  Surgeon: Ama Barrera MD;  Location: [facility] ENDO;  Service: Endoscopy;  Laterality: N/A;    COMPUTED TOMOGRAPHY N/A 9/9/2019    Procedure: CT (COMPUTED TOMOGRAPHY);  Surgeon: [facility] Diagnostic Provider;  Location: [facility] OR;  Service: General;  Laterality: N/A;  Microwave ablation to be done in CT.  Need CRNA and cart    COMPUTED TOMOGRAPHY N/A 1/25/2022    Procedure: Liver Microwave Ablation;  Surgeon: Red Puentes MD;  Location: [facility];  Service: General;  Laterality: N/A;    CORONARY ARTERY BYPASS GRAFT (CABG) N/A 11/5/2018    Procedure: CORONARY ARTERY BYPASS GRAFT (CABG);  Surgeon: Bear De Luna MD;  Location: [facility] OR;  Service: Cardiothoracic;  Laterality: N/A;  TWO VESSEL BYPASS WITH AORTOTOMY AND EXCISION OF ATHEROSCLEROTIC PLAQUE    CORONARY BYPASS GRAFT ANGIOGRAPHY  3/2/2020    Procedure: Bypass graft study;  Surgeon: Cora Cormier MD;  Location: [facility] CATH LAB;  Service: Cardiology;;    ECHOCARDIOGRAM,TRANSESOPHAGEAL N/A 6/24/2024    Procedure: Transesophageal echo (BERNARD) intra-procedure log documentation;  Surgeon: Nacho Downs MD;  Location: [facility] EP LAB;  Service: Cardiology;  Laterality: N/A;    ELBOW BURSA SURGERY Right 2010    ENDOSCOPIC HARVEST OF VEIN Left 11/5/2018    Procedure: SURGICAL PROCUREMENT, VEIN, ENDOSCOPIC;  Surgeon: Bear ELAM  MD Annamarie;  Location: [facility] OR;  Service: Cardiothoracic;  Laterality: Left;    ESOPHAGOGASTRODUODENOSCOPY N/A 7/17/2019    Procedure: ESOPHAGOGASTRODUODENOSCOPY (EGD);  Surgeon: David Carter III, MD;  Location: [facility] ENDO;  Service: Endoscopy;  Laterality: N/A;    ESOPHAGOGASTRODUODENOSCOPY N/A 12/29/2022    Procedure: ESOPHAGOGASTRODUODENOSCOPY (EGD);  Surgeon: Issa Gayle MD;  Location: [facility];  Service: Endoscopy;  Laterality: N/A;    ESOPHAGOGASTRODUODENOSCOPY N/A 1/17/2024    Procedure: EGD (ESOPHAGOGASTRODUODENOSCOPY);  Surgeon: Issa Gayle MD;  Location: [facility];  Service: Endoscopy;  Laterality: N/A;    ESOPHAGOGASTRODUODENOSCOPY N/A 4/30/2024    Procedure: EGD (ESOPHAGOGASTRODUODENOSCOPY);  Surgeon: Eder Foley MD;  Location: [facility];  Service: Gastroenterology;  Laterality: N/A;    ETHMOIDECTOMY Bilateral 3/27/2019    Procedure: ETHMOIDECTOMY;  Surgeon: Alejandro Parkinson MD;  Location: [facility] OR;  Service: ENT;  Laterality: Bilateral;    EYE SURGERY      FOOT SURGERY      FRONTAL SINUS OBLITERATION Bilateral 3/27/2019    Procedure: SINUSOTOMY, FRONTAL SINUS, OBLITERATIVE;  Surgeon: Alejandro Parkinson MD;  Location: [facility] OR;  Service: ENT;  Laterality: Bilateral;    FUNCTIONAL ENDOSCOPIC SINUS SURGERY (FESS) Bilateral 3/27/2019    Procedure: FESS (FUNCTIONAL ENDOSCOPIC SINUS SURGERY);  Surgeon: Alejandro Parkinson MD;  Location: [facility] OR;  Service: ENT;  Laterality: Bilateral;  Left Keila bullosa resection     INTRALUMINAL GASTROINTESTINAL TRACT IMAGING VIA CAPSULE N/A 2/2/2024    Procedure: IMAGING PROCEDURE, GI TRACT, INTRALUMINAL, VIA CAPSULE;  Surgeon: Cooper Estrella RN;  Location: [facility];  Service: Endoscopy;  Laterality: N/A;    KNEE ARTHROSCOPY W/ MENISCAL REPAIR Left 06/2019    dr boykin    KNEE SURGERY Right     LEFT HEART CATHETERIZATION Left 3/2/2020    Procedure: CATHETERIZATION, HEART, LEFT;  Surgeon: Cora Cormier MD;  Location: [facility] CATH LAB;  Service: Cardiology;   Laterality: Left;    LIVER BIOPSY  2022    MAXILLARY ANTROSTOMY Bilateral 3/27/2019    Procedure: MAXILLARY ANTROSTOMY;  Surgeon: Alejandro Parkinson MD;  Location: [facility] OR;  Service: ENT;  Laterality: Bilateral;    NASAL SEPTOPLASTY N/A 3/27/2019    Procedure: SEPTOPLASTY, NOSE;  Surgeon: Alejandro Parkinson MD;  Location: [facility] OR;  Service: ENT;  Laterality: N/A;    RECTAL SURGERY  2012    STENT, SUPERIOR MESENTERIC ARTERY Left 2024    Procedure: STENT, SUPERIOR MESENTERIC ARTERY;  Surgeon: ALEX Alfred III, MD;  Location: [facility] OR [address] [place] [address];  Service: Vascular;  Laterality: Left;    TRANSURETHRAL RESECTION OF PROSTATE (TURP) WITHOUT USE OF LASER N/A 2018    Procedure: TURP, WITHOUT USING LASER;  Surgeon: Cooper Cordova IV, MD;  Location: [facility] OR;  Service: Urology;  Laterality: N/A;    TRIGGER FINGER RELEASE Left        Review of patient's allergies indicates:   Allergen Reactions    Lipitor [atorvastatin] Other (See Comments)     Muscle aches and pains as well as fatigue.     Niacin preparations Other (See Comments)     Causes broken blood vessels and bruises at 4 times normal dose.    Statins-hmg-coa reductase inhibitors Other (See Comments)     Statins cause intolerable myalgias.    Iodinated contrast media Hives     Tachycardia    Omeprazole Hives and Itching    Prilosec [omeprazole magnesium] Hives and Itching     Family History       Problem Relation (Age of Onset)    Heart disease Mother, Father, Brother          Tobacco Use    Smoking status: Former     Current packs/day: 0.00     Average packs/day: 0.5 packs/day for 4.0 years (2.0 ttl pk-yrs)     Types: Cigarettes     Start date: 1968     Quit date: 1972     Years since quittin.1    Smokeless tobacco: Former     Types: Chew     Quit date: 1976   Substance and Sexual Activity    Alcohol use: Not Currently    Drug use: No    Sexual activity: Yes     Partners: Female     Review of Systems   Constitutional:  Negative for fever.   HENT:   Negative for hearing loss.    Eyes:  Negative for visual disturbance.   Respiratory:  Positive for shortness of breath. Negative for cough.    Cardiovascular:  Positive for chest pain. Negative for palpitations.   Gastrointestinal:         As per HPI.   Genitourinary:  Negative for difficulty urinating, dysuria, frequency and hematuria.   Musculoskeletal:  Negative for arthralgias and back pain.   Skin:  Negative for color change and rash.   Neurological:  Negative for seizures, syncope, weakness, numbness and headaches.   Hematological:  Does not bruise/bleed easily.   Psychiatric/Behavioral:  The patient is not nervous/anxious.      Objective:     Vital Signs (Most Recent):  Temp: 97.9 °F (36.6 °C) (07/25/24 0759)  Pulse: 91 (07/25/24 0841)  Resp: 18 (07/25/24 0759)  BP: 133/60 (07/25/24 0759)  SpO2: 96 % (07/25/24 0759) Vital Signs (24h Range):  Temp:  [97.9 °F (36.6 °C)-98.7 °F (37.1 °C)] 97.9 °F (36.6 °C)  Pulse:  [65-91] 91  Resp:  [16-20] 18  SpO2:  [96 %-100 %] 96 %  BP: (122-143)/(60-75) 133/60     Weight: 103 kg (227 lb 1.2 oz) (07/25/24 0845)  Body mass index is 35.56 kg/m².      Intake/Output Summary (Last 24 hours) at 7/25/2024 1003  Last data filed at 7/25/2024 0241  Gross per 24 hour   Intake 292.08 ml   Output --   Net 292.08 ml       Lines/Drains/Airways       Peripheral Intravenous Line  Duration                  Peripheral IV - Single Lumen 07/24/24 2338 20 G Anterior;Right Forearm <1 day                     Physical Exam  Constitutional:       General: He is not in acute distress.     Appearance: Normal appearance. He is well-developed.   HENT:      Head: Normocephalic and atraumatic.   Eyes:      Extraocular Movements: Extraocular movements intact.   Cardiovascular:      Rate and Rhythm: Normal rate and regular rhythm.      Heart sounds: Murmur heard.   Pulmonary:      Effort: Pulmonary effort is normal. No respiratory distress.      Breath sounds: Normal breath sounds. No wheezing.   Abdominal:       General: Bowel sounds are normal. There is no distension.      Palpations: Abdomen is soft. There is no mass.      Tenderness: There is no abdominal tenderness.   Musculoskeletal:      Cervical back: Normal range of motion and neck supple.      Right lower leg: Edema present.      Left lower leg: Edema present.   Skin:     General: Skin is warm and dry.      Findings: No rash.   Neurological:      Mental Status: He is alert and oriented to person, place, and time.      Cranial Nerves: No cranial nerve deficit.   Psychiatric:         Behavior: Behavior normal.          Significant Labs:  CBC:   Recent Labs   Lab 07/24/24 2152 07/25/24 0518   WBC 8.10 6.66   HGB 7.5* 8.6*   HCT 24.7* 28.1*    171     CMP:   Recent Labs   Lab 07/24/24 2152 07/25/24 0518   * 88   CALCIUM 9.2 8.8   ALBUMIN 3.7  --    PROT 6.9  --     141   K 3.6 4.1   CO2 24 25    106   BUN 28* 26*   CREATININE 1.5* 1.2   ALKPHOS 63  --    ALT 14  --    AST 16  --    BILITOT 0.2  --      Coagulation:   Recent Labs   Lab 07/24/24 2152   INR 1.4*   APTT 33.3*       Significant Imaging:  Imaging results within the past 24 hours have been reviewed.  Assessment/Plan:     Cardiac/Vascular  Paroxysmal atrial fibrillation  Xarelto on hold. Last dose 07/24/24 PM.     Oncology  * Symptomatic anemia  Acute on chronic. Likely multifactorial.   S/p iron infusion 07/23/24.  Will plan on EGD and colonoscopy (h/o ulcers) after Xarelto held two days. Last dose 04/24/24 PM.  Start Protonix IV bid.    Start clear liquid diet tomorrow.   Mag Citrate today and Golytely tomorrow.   Monitor H/H and transfuse additional units as needed.     Iron deficiency anemia due to chronic blood loss  Last iron infusion two days ago.     GI  Compensated HCV cirrhosis  Repeat INR tomorrow.   Monitor neuro status.   No clinical evidence to suggest variceal bleeding.     MELD 3.0: 11 at 7/25/2024  5:18 AM  MELD-Na: 12 at 7/25/2024  5:18 AM  Calculated  from:  Serum Creatinine: 1.2 mg/dL at 7/25/2024  5:18 AM  Serum Sodium: 141 mmol/L (Using max of 137 mmol/L) at 7/25/2024  5:18 AM  Total Bilirubin: 0.2 mg/dL (Using min of 1 mg/dL) at 7/24/2024  9:52 PM  Serum Albumin: 3.7 g/dL (Using max of 3.5 g/dL) at 7/24/2024  9:52 PM  INR(ratio): 1.4 at 7/24/2024  9:52 PM  Age at listing (hypothetical): 79 years  Sex: Male at 7/25/2024  5:18 AM    Melena  Patient reports black stools. Noted to have a decrease in Hgb.   Will plan for EGD and colonoscopy after Xarelto held 48 hours.       Thank you for your consult. I will follow-up with patient. Please contact us if you have any additional questions.    Phi Cordero PA-C  Gastroenterology  [facility] - Telemetry ([facility])

## 2024-07-25 NOTE — SUBJECTIVE & OBJECTIVE
Past Medical History:   Diagnosis Date    Anticoagulant long-term use     Aortic stenosis     Asthma     Benign prostatic hyperplasia with nocturia     Bilateral carotid artery stenosis 07/21/2021    US CAROTID BILATERAL 07/14/2021 IMPRESSION: Atherosclerosis with suspected stenosis greater than 50% at the right proximal ICA visually. Velocities are discordant and appear improved from prior. Atherosclerosis on the left with no evidence of flow-limiting stenosis greater than 50%.  FINDINGS: Right: Internal Carotid Artery (ICA): Peak systolic velocity 118 cm/sec. 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 09/22/2020    DELONTE on CPAP     Osteoarthritis     Paroxysmal atrial fibrillation     PVD (peripheral vascular disease)     Secondary esophageal varices without bleeding 06/26/2017    Type 2 diabetes mellitus        Past Surgical History:   Procedure Laterality Date    ABLATION OF ARRHYTHMOGENIC FOCUS FOR ATRIAL FIBRILLATION N/A 6/24/2024    Procedure: Ablation atrial fibrillation;  Surgeon: Horacio Huerta MD;  Location: [facility] EP LAB;  Service: Cardiology;  Laterality: N/A;  afib, PVI, RFA, BERNARD (cx if SR), ZIA, anes, MB, 3 Prep, 3 hours per Dr. Huerta    ANGIOGRAM, EXTREMITY, UNILATERAL Left 1/4/2024    Procedure: ANGIOGRAM, EXTREMITY, UNILATERAL- Femoral / SMS Stent, Poss General;  Surgeon: ALEX Alfred III, MD;  Location: [facility] OR [address] [place] [address];  Service: Vascular;  Laterality: Left;  mGy : 2698.78  Gy.cm : 229.88  Contrast :  62ml  Fluro time : 13.8min    CARDIAC CATHETERIZATION      CARDIAC SURGERY      CARPAL TUNNEL RELEASE Left     CATARACT EXTRACTION      CATARACT EXTRACTION W/ INTRAOCULAR LENS  IMPLANT, BILATERAL  2008    CATHETERIZATION OF BOTH LEFT AND RIGHT HEART N/A 11/2/2018    Procedure: CATHETERIZATION, HEART, BOTH LEFT AND RIGHT;  Surgeon: Frank Gallardo MD;  Location: [facility] CATH LAB;  Service: Cardiology;  Laterality: N/A;    COLONOSCOPY  8/2013    COLONOSCOPY N/A 5/30/2016    Procedure: COLONOSCOPY;  Surgeon: Anna Tomas MD;  Location: [facility] ENDO;  Service: Endoscopy;  Laterality: N/A;    COLONOSCOPY N/A 7/17/2019    Procedure: COLONOSCOPY;  Surgeon: David Carter III, MD;  Location: [facility] ENDO;  Service: Endoscopy;  Laterality: N/A;    COLONOSCOPY N/A 12/14/2023    Procedure: COLONOSCOPY;  Surgeon: Ama Barrera MD;  Location: [facility] ENDO;  Service: Endoscopy;  Laterality: N/A;    COMPUTED TOMOGRAPHY N/A 9/9/2019    Procedure: CT (COMPUTED TOMOGRAPHY);  Surgeon: [facility] Provider;  Location: [facility] OR;  Service: General;  Laterality: N/A;  Microwave ablation to be done in CT.  Need CRNA and cart    COMPUTED TOMOGRAPHY N/A 1/25/2022    Procedure: Liver Microwave Ablation;  Surgeon: Red Puentes MD;  Location: [facility];  Service: General;  Laterality: N/A;    CORONARY ARTERY BYPASS GRAFT (CABG) N/A 11/5/2018    Procedure: CORONARY ARTERY BYPASS GRAFT (CABG);  Surgeon: Bear De Luna MD;  Location: [facility];  Service: Cardiothoracic;  Laterality: N/A;  TWO VESSEL BYPASS WITH AORTOTOMY AND EXCISION OF ATHEROSCLEROTIC PLAQUE    CORONARY BYPASS GRAFT ANGIOGRAPHY  3/2/2020    Procedure: Bypass graft study;  Surgeon: Cora Cormier MD;  Location: [facility] CATH LAB;  Service: Cardiology;;    ECHOCARDIOGRAM,TRANSESOPHAGEAL N/A 6/24/2024    Procedure: Transesophageal echo (BERNARD) intra-procedure log documentation;  Surgeon: Nacho Downs MD;  Location: [facility] EP LAB;  Service: Cardiology;  Laterality: N/A;     ELBOW BURSA SURGERY Right 2010    ENDOSCOPIC HARVEST OF VEIN Left 11/5/2018    Procedure: SURGICAL PROCUREMENT, VEIN, ENDOSCOPIC;  Surgeon: Bear De Luna MD;  Location: [facility];  Service: Cardiothoracic;  Laterality: Left;    ESOPHAGOGASTRODUODENOSCOPY N/A 7/17/2019    Procedure: ESOPHAGOGASTRODUODENOSCOPY (EGD);  Surgeon: David Carter III, MD;  Location: [facility];  Service: Endoscopy;  Laterality: N/A;    ESOPHAGOGASTRODUODENOSCOPY N/A 12/29/2022    Procedure: ESOPHAGOGASTRODUODENOSCOPY (EGD);  Surgeon: Issa Gayle MD;  Location: [facility];  Service: Endoscopy;  Laterality: N/A;    ESOPHAGOGASTRODUODENOSCOPY N/A 1/17/2024    Procedure: EGD (ESOPHAGOGASTRODUODENOSCOPY);  Surgeon: Issa Gayle MD;  Location: [facility];  Service: Endoscopy;  Laterality: N/A;    ESOPHAGOGASTRODUODENOSCOPY N/A 4/30/2024    Procedure: EGD (ESOPHAGOGASTRODUODENOSCOPY);  Surgeon: Eder Foley MD;  Location: [facility];  Service: Gastroenterology;  Laterality: N/A;    ETHMOIDECTOMY Bilateral 3/27/2019    Procedure: ETHMOIDECTOMY;  Surgeon: Alejandro Parkinson MD;  Location: [facility];  Service: ENT;  Laterality: Bilateral;    EYE SURGERY      FOOT SURGERY      FRONTAL SINUS OBLITERATION Bilateral 3/27/2019    Procedure: SINUSOTOMY, FRONTAL SINUS, OBLITERATIVE;  Surgeon: Alejandro Parkinson MD;  Location: [facility] OR;  Service: ENT;  Laterality: Bilateral;    FUNCTIONAL ENDOSCOPIC SINUS SURGERY (FESS) Bilateral 3/27/2019    Procedure: FESS (FUNCTIONAL ENDOSCOPIC SINUS SURGERY);  Surgeon: Alejandro Parkinson MD;  Location: [facility] OR;  Service: ENT;  Laterality: Bilateral;  Left Keila bullosa resection     INTRALUMINAL GASTROINTESTINAL TRACT IMAGING VIA CAPSULE N/A 2/2/2024    Procedure: IMAGING PROCEDURE, GI TRACT, INTRALUMINAL, VIA CAPSULE;  Surgeon: Cooper Estrella RN;  Location: [facility];  Service: Endoscopy;  Laterality: N/A;    KNEE ARTHROSCOPY W/ MENISCAL REPAIR Left 06/2019    dr boykin    KNEE SURGERY Right     LEFT HEART  CATHETERIZATION Left 3/2/2020    Procedure: CATHETERIZATION, HEART, LEFT;  Surgeon: Cora Cormier MD;  Location: [facility] CATH LAB;  Service: Cardiology;  Laterality: Left;    LIVER BIOPSY  2022    MAXILLARY ANTROSTOMY Bilateral 3/27/2019    Procedure: MAXILLARY ANTROSTOMY;  Surgeon: Alejandro Parkinson MD;  Location: [facility] OR;  Service: ENT;  Laterality: Bilateral;    NASAL SEPTOPLASTY N/A 3/27/2019    Procedure: SEPTOPLASTY, NOSE;  Surgeon: Alejandro Parkinson MD;  Location: [facility] OR;  Service: ENT;  Laterality: N/A;    RECTAL SURGERY      STENT, SUPERIOR MESENTERIC ARTERY Left 2024    Procedure: STENT, SUPERIOR MESENTERIC ARTERY;  Surgeon: ALEX Alfred III, MD;  Location: [address];  Service: Vascular;  Laterality: Left;    TRANSURETHRAL RESECTION OF PROSTATE (TURP) WITHOUT USE OF LASER N/A 2018    Procedure: TURP, WITHOUT USING LASER;  Surgeon: Cooper Cordova IV, MD;  Location: [facility] OR;  Service: Urology;  Laterality: N/A;    TRIGGER FINGER RELEASE Left        Review of patient's allergies indicates:   Allergen Reactions    Lipitor [atorvastatin] Other (See Comments)     Muscle aches and pains as well as fatigue.     Niacin preparations Other (See Comments)     Causes broken blood vessels and bruises at 4 times normal dose.    Statins-hmg-coa reductase inhibitors Other (See Comments)     Statins cause intolerable myalgias.    Iodinated contrast media Hives     Tachycardia    Omeprazole Hives and Itching    Prilosec [omeprazole magnesium] Hives and Itching     Family History       Problem Relation (Age of Onset)    Heart disease Mother, Father, Brother          Tobacco Use    Smoking status: Former     Current packs/day: 0.00     Average packs/day: 0.5 packs/day for 4.0 years (2.0 ttl pk-yrs)     Types: Cigarettes     Start date: 1968     Quit date: 1972     Years since quittin.1    Smokeless tobacco: Former     Types: Chew     Quit date: 1976   Substance and Sexual Activity    Alcohol  use: Not Currently    Drug use: No    Sexual activity: Yes     Partners: Female     Review of Systems   Constitutional:  Negative for fever.   HENT:  Negative for hearing loss.    Eyes:  Negative for visual disturbance.   Respiratory:  Positive for shortness of breath. Negative for cough.    Cardiovascular:  Positive for chest pain. Negative for palpitations.   Gastrointestinal:         As per HPI.   Genitourinary:  Negative for difficulty urinating, dysuria, frequency and hematuria.   Musculoskeletal:  Negative for arthralgias and back pain.   Skin:  Negative for color change and rash.   Neurological:  Negative for seizures, syncope, weakness, numbness and headaches.   Hematological:  Does not bruise/bleed easily.   Psychiatric/Behavioral:  The patient is not nervous/anxious.      Objective:     Vital Signs (Most Recent):  Temp: 97.9 °F (36.6 °C) (07/25/24 0759)  Pulse: 91 (07/25/24 0841)  Resp: 18 (07/25/24 0759)  BP: 133/60 (07/25/24 0759)  SpO2: 96 % (07/25/24 0759) Vital Signs (24h Range):  Temp:  [97.9 °F (36.6 °C)-98.7 °F (37.1 °C)] 97.9 °F (36.6 °C)  Pulse:  [65-91] 91  Resp:  [16-20] 18  SpO2:  [96 %-100 %] 96 %  BP: (122-143)/(60-75) 133/60     Weight: 103 kg (227 lb 1.2 oz) (07/25/24 0845)  Body mass index is 35.56 kg/m².      Intake/Output Summary (Last 24 hours) at 7/25/2024 1003  Last data filed at 7/25/2024 0241  Gross per 24 hour   Intake 292.08 ml   Output --   Net 292.08 ml       Lines/Drains/Airways       Peripheral Intravenous Line  Duration                  Peripheral IV - Single Lumen 07/24/24 2338 20 G Anterior;Right Forearm <1 day                     Physical Exam  Constitutional:       General: He is not in acute distress.     Appearance: Normal appearance. He is well-developed.   HENT:      Head: Normocephalic and atraumatic.   Eyes:      Extraocular Movements: Extraocular movements intact.   Cardiovascular:      Rate and Rhythm: Normal rate and regular rhythm.      Heart sounds: Murmur  heard.   Pulmonary:      Effort: Pulmonary effort is normal. No respiratory distress.      Breath sounds: Normal breath sounds. No wheezing.   Abdominal:      General: Bowel sounds are normal. There is no distension.      Palpations: Abdomen is soft. There is no mass.      Tenderness: There is no abdominal tenderness.   Musculoskeletal:      Cervical back: Normal range of motion and neck supple.      Right lower leg: Edema present.      Left lower leg: Edema present.   Skin:     General: Skin is warm and dry.      Findings: No rash.   Neurological:      Mental Status: He is alert and oriented to person, place, and time.      Cranial Nerves: No cranial nerve deficit.   Psychiatric:         Behavior: Behavior normal.          Significant Labs:  CBC:   Recent Labs   Lab 07/24/24 2152 07/25/24  0518   WBC 8.10 6.66   HGB 7.5* 8.6*   HCT 24.7* 28.1*    171     CMP:   Recent Labs   Lab 07/24/24 2152 07/25/24 0518   * 88   CALCIUM 9.2 8.8   ALBUMIN 3.7  --    PROT 6.9  --     141   K 3.6 4.1   CO2 24 25    106   BUN 28* 26*   CREATININE 1.5* 1.2   ALKPHOS 63  --    ALT 14  --    AST 16  --    BILITOT 0.2  --      Coagulation:   Recent Labs   Lab 07/24/24 2152   INR 1.4*   APTT 33.3*       Significant Imaging:  Imaging results within the past 24 hours have been reviewed.

## 2024-07-25 NOTE — NURSING
POC reviewed with pt. Pt verbalizes understanding of POC. No questions at this time.  AAOx3. NADN.  SA, Afib on cardiac monitor.  EGD & Colonoscopy is scheduled for this Saturday.  Currently on clear liquid diet in prep of the colonoscopy.   Pt remains free of falls.  No complaints at this time.  Safety measures in place. Will continue to monitor.  Informed pt to call for assistance before getting up. Pt verbalizes understanding.  Hourly rounding and chart check complete.

## 2024-07-25 NOTE — PLAN OF CARE
[facility] - Telemetry (Hospital)  Initial Discharge Assessment       Primary Care Provider: Bhupinder Callaway MD    Admission Diagnosis: Melena [K92.1]  LESTER (acute kidney injury) [N17.9]  Chest pain [R07.9]  Symptomatic anemia [D64.9]    Admission Date: 7/24/2024  Expected Discharge Date:     Transition of Care Barriers: None    Payor: [facility] MCARE / Plan: [employer] [facility] MEDICARE ADVANTAGE / Product Type: Medicare Advantage /     Extended Emergency Contact Information  Primary Emergency Contact: Henrry Pretty  Mobile Phone: [phone]  Relation: Son  Preferred language: English   needed? No    Discharge Plan A: Home  Discharge Plan B: Home      [facility] - [place], [place] - [address] [facility] [address]  [address] [address] [address]  Phone: [phone] Fax: [phone]      Initial Assessment (most recent)       Adult Discharge Assessment - 07/25/24 1420          Discharge Assessment    Assessment Type Discharge Planning Assessment     Confirmed/corrected address, phone number and insurance Yes     Confirmed Demographics Correct on Facesheet     Source of Information patient     Communicated KOURTNEY with patient/caregiver Date not available/Unable to determine     Reason For Admission Symptomatic anemia     People in Home alone     Facility Arrived From: Home     Do you expect to return to your current living situation? Yes     Do you have help at home or someone to help you manage your care at home? Yes     Who are your caregiver(s) and their phone number(s)? Henrry Baez     Prior to hospitilization cognitive status: Alert/Oriented     Current cognitive status: Alert/Oriented     Walking or Climbing Stairs Difficulty no     Dressing/Bathing Difficulty no     Equipment Currently Used at Home none     Readmission within 30 days? No     Patient currently being followed by outpatient case management? No     Do you currently have service(s) that help you manage your care at home?  Your information has been submitted on October 18th, 2018 at 09:37:38 AM CDT. The confirmation number is [id number]     No     Do you take prescription medications? Yes     Do you have prescription coverage? Yes     Do you have any problems affording any of your prescribed medications? No     Is the patient taking medications as prescribed? yes     Who is going to help you get home at discharge? SonHenrry     How do you get to doctors appointments? car, drives self;family or friend will provide     Are you on dialysis? No     Do you take coumadin? No     Discharge Plan A Home     Discharge Plan B Home     DME Needed Upon Discharge  none     Discharge Plan discussed with: Patient     Transition of Care Barriers None                   SW met with patient at bedside to complete discharge assessment. Pt reports living at home alone; independent with care and does not use DME at this time.     Pt's whiteboard updated with CM contact and anticipated discharge disposition. SW to remain available as needed.

## 2024-07-26 ENCOUNTER — ANESTHESIA EVENT (OUTPATIENT)
Dept: ENDOSCOPY | Facility: HOSPITAL | Age: 79
End: 2024-07-26
Payer: MEDICARE

## 2024-07-26 LAB
BASOPHILS # BLD AUTO: 0.04 K/UL (ref 0–0.2)
BASOPHILS NFR BLD: 0.9 % (ref 0–1.9)
DIFFERENTIAL METHOD BLD: ABNORMAL
EOSINOPHIL # BLD AUTO: 0.1 K/UL (ref 0–0.5)
EOSINOPHIL NFR BLD: 1.9 % (ref 0–8)
ERYTHROCYTE [DISTWIDTH] IN BLOOD BY AUTOMATED COUNT: 21.5 % (ref 11.5–14.5)
HCT VFR BLD AUTO: 29.4 % (ref 40–54)
HGB BLD-MCNC: 8.9 G/DL (ref 14–18)
IMM GRANULOCYTES # BLD AUTO: 0.07 K/UL (ref 0–0.04)
IMM GRANULOCYTES NFR BLD AUTO: 1.6 % (ref 0–0.5)
INR PPP: 1 (ref 0.8–1.2)
LYMPHOCYTES # BLD AUTO: 0.9 K/UL (ref 1–4.8)
LYMPHOCYTES NFR BLD: 21.2 % (ref 18–48)
MCH RBC QN AUTO: 27.5 PG (ref 27–31)
MCHC RBC AUTO-ENTMCNC: 30.3 G/DL (ref 32–36)
MCV RBC AUTO: 91 FL (ref 82–98)
MONOCYTES # BLD AUTO: 0.5 K/UL (ref 0.3–1)
MONOCYTES NFR BLD: 11.7 % (ref 4–15)
NEUTROPHILS # BLD AUTO: 2.7 K/UL (ref 1.8–7.7)
NEUTROPHILS NFR BLD: 62.7 % (ref 38–73)
NRBC BLD-RTO: 1 /100 WBC
OHS QRS DURATION: 94 MS
OHS QTC CALCULATION: 413 MS
PLATELET # BLD AUTO: 186 K/UL (ref 150–450)
PMV BLD AUTO: 10.2 FL (ref 9.2–12.9)
POCT GLUCOSE: 113 MG/DL (ref 70–110)
POCT GLUCOSE: 121 MG/DL (ref 70–110)
POCT GLUCOSE: 126 MG/DL (ref 70–110)
POCT GLUCOSE: 49 MG/DL (ref 70–110)
POCT GLUCOSE: 99 MG/DL (ref 70–110)
PROTHROMBIN TIME: 11.6 SEC (ref 9–12.5)
RBC # BLD AUTO: 3.24 M/UL (ref 4.6–6.2)
WBC # BLD AUTO: 4.29 K/UL (ref 3.9–12.7)

## 2024-07-26 PROCEDURE — 25000003 PHARM REV CODE 250: Performed by: INTERNAL MEDICINE

## 2024-07-26 PROCEDURE — 63600175 PHARM REV CODE 636 W HCPCS: Performed by: PHYSICIAN ASSISTANT

## 2024-07-26 PROCEDURE — 36415 COLL VENOUS BLD VENIPUNCTURE: CPT | Performed by: PHYSICIAN ASSISTANT

## 2024-07-26 PROCEDURE — 21400001 HC TELEMETRY ROOM

## 2024-07-26 PROCEDURE — 85610 PROTHROMBIN TIME: CPT | Performed by: PHYSICIAN ASSISTANT

## 2024-07-26 PROCEDURE — 85025 COMPLETE CBC W/AUTO DIFF WBC: CPT | Performed by: PHYSICIAN ASSISTANT

## 2024-07-26 PROCEDURE — 63600175 PHARM REV CODE 636 W HCPCS: Performed by: NURSE PRACTITIONER

## 2024-07-26 PROCEDURE — 25000003 PHARM REV CODE 250: Performed by: NURSE PRACTITIONER

## 2024-07-26 RX ADMIN — LEVOTHYROXINE SODIUM 300 MCG: 150 TABLET ORAL at 06:07

## 2024-07-26 RX ADMIN — LISINOPRIL 10 MG: 10 TABLET ORAL at 10:07

## 2024-07-26 RX ADMIN — FINASTERIDE 5 MG: 5 TABLET, FILM COATED ORAL at 10:07

## 2024-07-26 RX ADMIN — RANOLAZINE 1000 MG: 500 TABLET, EXTENDED RELEASE ORAL at 10:07

## 2024-07-26 RX ADMIN — CARVEDILOL 25 MG: 12.5 TABLET, FILM COATED ORAL at 10:07

## 2024-07-26 RX ADMIN — PROPAFENONE HYDROCHLORIDE 150 MG: 150 TABLET, FILM COATED ORAL at 09:07

## 2024-07-26 RX ADMIN — CARVEDILOL 25 MG: 12.5 TABLET, FILM COATED ORAL at 05:07

## 2024-07-26 RX ADMIN — INSULIN GLARGINE 22 UNITS: 100 INJECTION, SOLUTION SUBCUTANEOUS at 10:07

## 2024-07-26 RX ADMIN — OXYBUTYNIN CHLORIDE 5 MG: 5 TABLET, EXTENDED RELEASE ORAL at 10:07

## 2024-07-26 RX ADMIN — PROPAFENONE HYDROCHLORIDE 150 MG: 150 TABLET, FILM COATED ORAL at 03:07

## 2024-07-26 RX ADMIN — PANTOPRAZOLE SODIUM 40 MG: 40 INJECTION, POWDER, FOR SOLUTION INTRAVENOUS at 09:07

## 2024-07-26 RX ADMIN — PANTOPRAZOLE SODIUM 40 MG: 40 INJECTION, POWDER, FOR SOLUTION INTRAVENOUS at 10:07

## 2024-07-26 RX ADMIN — POLYETHYLENE GLYCOL 3350, SODIUM SULFATE ANHYDROUS, SODIUM BICARBONATE, SODIUM CHLORIDE, POTASSIUM CHLORIDE 4000 ML: 236; 22.74; 6.74; 5.86; 2.97 POWDER, FOR SOLUTION ORAL at 06:07

## 2024-07-26 RX ADMIN — PROPAFENONE HYDROCHLORIDE 150 MG: 150 TABLET, FILM COATED ORAL at 05:07

## 2024-07-26 RX ADMIN — RANOLAZINE 1000 MG: 500 TABLET, EXTENDED RELEASE ORAL at 09:07

## 2024-07-26 NOTE — ASSESSMENT & PLAN NOTE
Patient's FSGs are controlled on current medication regimen.  Last A1c reviewed-   Lab Results   Component Value Date    HGBA1C 5.2 07/24/2024     Most recent fingerstick glucose reviewed-   Recent Labs   Lab 07/25/24  1715 07/25/24  2209 07/26/24  0556   POCTGLUCOSE 101 122* 99     Current correctional scale  Low  Maintain anti-hyperglycemic dose as follows-   Antihyperglycemics (From admission, onward)      Start     Stop Route Frequency Ordered    07/25/24 2100  insulin glargine U-100 (Lantus) pen 22 Units         -- SubQ Nightly 07/25/24 0552    07/25/24 0226  insulin aspart U-100 pen 0-5 Units         -- SubQ Every 6 hours PRN 07/25/24 0126

## 2024-07-26 NOTE — ASSESSMENT & PLAN NOTE
Anemia is likely due to chronic blood loss and Iron deficiency. Most recent hemoglobin and hematocrit are listed below.  Recent Labs     07/25/24  0518 07/25/24  2239 07/26/24  0501   HGB 8.6* 8.8* 8.9*   HCT 28.1* 28.7* 29.4*

## 2024-07-26 NOTE — ASSESSMENT & PLAN NOTE
LESTER is likely due to pre-renal azotemia due to dehydration. Baseline creatinine is  1.2 . Most recent creatinine and eGFR are listed below.  Recent Labs     07/24/24  2152 07/25/24  0518   CREATININE 1.5* 1.2   EGFRNORACEVR 47* >60          -resolved

## 2024-07-26 NOTE — CARE UPDATE
Tolerating bowel prep. SonHenrry at bedside. Discussed plans for SBE and colonoscopy tomorrow. If endoscopic evaluation negative, will repeat outpatient video capsule endoscopy. Patient is scheduled for outpatient Watchman's procedure next with Dr. Huerta. This will likely need to be deferred. Will communicate with CV regarding use of heparin bolus during SBE   4 = No assist / stand by assistance

## 2024-07-26 NOTE — ASSESSMENT & PLAN NOTE
Patient with known CAD s/p CABG, which is controlled  monitor for S/Sx of angina/ACS.   -holding aspirin and Xarelto for now

## 2024-07-26 NOTE — SUBJECTIVE & OBJECTIVE
Interval History: f/u anemia patient reports large bowel movement after mag citrate. Working on bowel prep this morning    Review of Systems  Objective:     Vital Signs (Most Recent):  Temp: 98.2 °F (36.8 °C) (07/26/24 0850)  Pulse: 64 (07/26/24 0858)  Resp: 18 (07/26/24 0850)  BP: (!) 154/72 (07/26/24 0850)  SpO2: 98 % (07/26/24 0850) Vital Signs (24h Range):  Temp:  [97.5 °F (36.4 °C)-98.2 °F (36.8 °C)] 98.2 °F (36.8 °C)  Pulse:  [61-68] 64  Resp:  [18] 18  SpO2:  [98 %-100 %] 98 %  BP: (109-154)/(60-80) 154/72     Weight: 103 kg (227 lb 1.2 oz)  Body mass index is 35.56 kg/m².    Intake/Output Summary (Last 24 hours) at 7/26/2024 1143  Last data filed at 7/26/2024 0651  Gross per 24 hour   Intake 540 ml   Output --   Net 540 ml         Physical Exam  HENT:      Head: Normocephalic and atraumatic.   Cardiovascular:      Rate and Rhythm: Normal rate and regular rhythm.      Heart sounds: No murmur heard.  Pulmonary:      Effort: Pulmonary effort is normal. No respiratory distress.      Breath sounds: Normal breath sounds. No wheezing.   Abdominal:      General: Bowel sounds are normal. There is no distension.      Palpations: Abdomen is soft.      Tenderness: There is no abdominal tenderness.   Musculoskeletal:         General: No swelling.   Skin:     General: Skin is warm and dry.   Neurological:      Mental Status: He is alert and oriented to person, place, and time. Mental status is at baseline.             Significant Labs: All pertinent labs within the past 24 hours have been reviewed.  Recent Lab Results  (Last 5 results in the past 24 hours)        07/26/24  0556   07/26/24  0501   07/25/24  2239   07/25/24  2209   07/25/24  1715        Baso #   0.04   0.03           Basophil %   0.9   0.6           Differential Method   Automated   Automated           Eos #   0.1   0.1           Eos %   1.9   1.8           Gran # (ANC)   2.7   3.2           Gran %   62.7   61.8           Hematocrit   29.4   28.7            Hemoglobin   8.9   8.8           Immature Grans (Abs)   0.07  Comment: Mild elevation in immature granulocytes is non specific and   can be seen in a variety of conditions including stress response,   acute inflammation, trauma and pregnancy. Correlation with other   laboratory and clinical findings is essential.     0.12  Comment: Mild elevation in immature granulocytes is non specific and   can be seen in a variety of conditions including stress response,   acute inflammation, trauma and pregnancy. Correlation with other   laboratory and clinical findings is essential.             Immature Granulocytes   1.6   2.4           INR   1.0  Comment: Coumadin Therapy:  2.0 - 3.0 for INR for all indicators except mechanical heart valves  and antiphospholipid syndromes which should use 2.5 - 3.5.               Lymph #   0.9   1.1           Lymph %   21.2   22.2           MCH   27.5   27.9           MCHC   30.3   30.7           MCV   91   91           Mono #   0.5   0.6           Mono %   11.7   11.2           MPV   10.2   9.4           nRBC   1   1           Platelet Count   186   165           POCT Glucose 99       122   101       PT   11.6             RBC   3.24   3.15           RDW   21.5   21.8           WBC   4.29   5.09                                  Significant Imaging: I have reviewed all pertinent imaging results/findings within the past 24 hours.

## 2024-07-26 NOTE — ASSESSMENT & PLAN NOTE
Anemia is likely due to chronic blood loss and Iron deficiency. Most recent hemoglobin and hematocrit are listed below.  Recent Labs     07/25/24  0518 07/25/24  2239 07/26/24  0501   HGB 8.6* 8.8* 8.9*   HCT 28.1* 28.7* 29.4*         - Monitor serial CBC  - Transfuse PRBC if patient becomes hemodynamically unstable, symptomatic or H/H drops below 7/21.  - Patient Has received 1 units of PRBCs on 7/24/24  - Patient's anemia is currently  being monitored

## 2024-07-26 NOTE — PROGRESS NOTES
[facility] Medicine  Progress Note    Patient Name: Erendira Pretty  MRN: [id number]  Patient Class: IP- Inpatient   Admission Date: 7/24/2024  Length of Stay: 1 days  Attending Physician: Magalis Machado MD  Primary Care Provider: Bhupinder Callaway MD        Subjective:     Principal Problem:Symptomatic anemia        HPI:  Erendira Pretty is a 79 y.o. 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  Presented to the ER for persistent shortness of breath that worsened today which is exacerbated by exertion.  Patient reports he has been having symptoms for the last 6 months.  Patient was hospitalized numerous times for symptomatic anemia requiring blood transfusions and iron infusions.  Patient had pill camera, EGD, and colonoscopy performed without any identifiable source of active bleed.  Patient was taken aspirin, Plavix, and Xarelto, but has since been taken off of Plavix in his only currently taking aspirin and Xarelto.  Patient had an iron infusion performed on 07/23/2024.  Patient was evaluated by his cardiologist (Dr. Gallardo) on 07/17/2024.  Patient is scheduled to have Watchman procedure performed in the next couple of weeks.  Patient states he has also had  associated black colored stools, but this has been ongoing as well.  Denies any passage of blood clots or bright red/dark red blood that feels toilet bowl.  Denies any use of home oxygen.  Denies any actual chest pain or palpitations.  Denies lightheadedness/dizziness/cough, cold, congestion, recent illnesses, sick contacts, or any other symptoms at this time.    ER workup revealed H/H of 7.5/24.7 (previously 9.0/29.4 on 07/16/2024), BUN/creatinine 28/1.5 (previously 13/1.2 on 07/16/2024),  mg/dL, , troponin negative, occult blood stool positive, chest x-ray negative for acute findings, EKG revealed sinus rhythm with first-degree AV block with PACs with a ventricular rate of 72 beats per minute and a QT/QTC of 378/413.  Patient received 40 mg Protonix IV and has 1 unit of PRBCs ordered by ER provider.  Hospital Medicine consulted to admit patient for symptomatic anemia in setting of GI bleed and LESTER.  Patient in agreement with treatment plan.  Patient will be admitted under inpatient status.    PCP: Bhupinder Callaway      Overview/Hospital Course:  No notes on file    Interval History: f/u anemia patient reports large bowel movement after mag citrate. Working on bowel prep this morning    Review of Systems  Objective:     Vital Signs (Most Recent):  Temp: 98.2 °F (36.8 °C) (07/26/24 0850)  Pulse: 64 (07/26/24 0858)  Resp: 18 (07/26/24 0850)  BP: (!) 154/72 (07/26/24 0850)  SpO2: 98 % (07/26/24 0850) Vital Signs (24h Range):  Temp:  [97.5 °F (36.4 °C)-98.2 °F (36.8 °C)] 98.2 °F (36.8 °C)  Pulse:  [61-68] 64  Resp:  [18] 18  SpO2:  [98 %-100 %] 98 %  BP: (109-154)/(60-80) 154/72     Weight: 103 kg (227 lb 1.2 oz)  Body mass index is 35.56 kg/m².    Intake/Output Summary (Last 24 hours) at 7/26/2024 1143  Last data filed at 7/26/2024 0651  Gross per 24 hour   Intake 540 ml   Output --   Net 540 ml         Physical Exam  HENT:      Head: Normocephalic and atraumatic.   Cardiovascular:      Rate and  Rhythm: Normal rate and regular rhythm.      Heart sounds: No murmur heard.  Pulmonary:      Effort: Pulmonary effort is normal. No respiratory distress.      Breath sounds: Normal breath sounds. No wheezing.   Abdominal:      General: Bowel sounds are normal. There is no distension.      Palpations: Abdomen is soft.      Tenderness: There is no abdominal tenderness.   Musculoskeletal:         General: No swelling.   Skin:     General: Skin is warm and dry.   Neurological:      Mental Status: He is alert and oriented to person, place, and time. Mental status is at baseline.             Significant Labs: All pertinent labs within the past 24 hours have been reviewed.  Recent Lab Results  (Last 5 results in the past 24 hours)        07/26/24  0556   07/26/24  0501   07/25/24  2239   07/25/24  2209   07/25/24  1715        Baso #   0.04   0.03           Basophil %   0.9   0.6           Differential Method   Automated   Automated           Eos #   0.1   0.1           Eos %   1.9   1.8           Gran # (ANC)   2.7   3.2           Gran %   62.7   61.8           Hematocrit   29.4   28.7           Hemoglobin   8.9   8.8           Immature Grans (Abs)   0.07  Comment: Mild elevation in immature granulocytes is non specific and   can be seen in a variety of conditions including stress response,   acute inflammation, trauma and pregnancy. Correlation with other   laboratory and clinical findings is essential.     0.12  Comment: Mild elevation in immature granulocytes is non specific and   can be seen in a variety of conditions including stress response,   acute inflammation, trauma and pregnancy. Correlation with other   laboratory and clinical findings is essential.             Immature Granulocytes   1.6   2.4           INR   1.0  Comment: Coumadin Therapy:  2.0 - 3.0 for INR for all indicators except mechanical heart valves  and antiphospholipid syndromes which should use 2.5 - 3.5.               Lymph #   0.9   1.1            Lymph %   21.2   22.2           MCH   27.5   27.9           MCHC   30.3   30.7           MCV   91   91           Mono #   0.5   0.6           Mono %   11.7   11.2           MPV   10.2   9.4           nRBC   1   1           Platelet Count   186   165           POCT Glucose 99       122   101       PT   11.6             RBC   3.24   3.15           RDW   21.5   21.8           WBC   4.29   5.09                                  Significant Imaging: I have reviewed all pertinent imaging results/findings within the past 24 hours.    Assessment/Plan:      * Symptomatic anemia  Anemia is likely due to chronic blood loss and Iron deficiency. Most recent hemoglobin and hematocrit are listed below.  Recent Labs     07/25/24  0518 07/25/24  2239 07/26/24  0501   HGB 8.6* 8.8* 8.9*   HCT 28.1* 28.7* 29.4*         - Monitor serial CBC  - Transfuse PRBC if patient becomes hemodynamically unstable, symptomatic or H/H drops below 7/21.  - Patient Has received 1 units of PRBCs on 7/24/24  - Patient's anemia is currently  being monitored      LESTER (acute kidney injury)  LESTER is likely due to pre-renal azotemia due to dehydration. Baseline creatinine is  1.2 . Most recent creatinine and eGFR are listed below.  Recent Labs     07/24/24  2152 07/25/24  0518   CREATININE 1.5* 1.2   EGFRNORACEVR 47* >60          -resolved      Melena  Hx of GI bleed with no identifiable source.  Takes aspirin and Xarelto daily.  Recent iron infusion on 07/23/2024. Receiving 1 unit PRBCs currently.  Plan:  -repeat cbc in am  -Hold ASA and Xarelto  -GI consult  -NPO    Paroxysmal atrial fibrillation  Patient with Paroxysmal (<7 days) atrial fibrillation which is controlled currently with Beta Blocker and Rythmol . Patient is currently in sinus rhythm.[id number] Score: 4. HASBLED Score: . Anticoagulation indicated. Anticoagulation done with ASA and Xarelto .  However, and holding ASA and Xarelto due to symptomatic anemia    Type 2 diabetes mellitus with  microalbuminuria, with long-term current use of insulin  Patient's FSGs are controlled on current medication regimen.  Last A1c reviewed-   Lab Results   Component Value Date    HGBA1C 5.2 07/24/2024     Most recent fingerstick glucose reviewed-   Recent Labs   Lab 07/25/24  1715 07/25/24  2209 07/26/24  0556   POCTGLUCOSE 101 122* 99     Current correctional scale  Low  Maintain anti-hyperglycemic dose as follows-   Antihyperglycemics (From admission, onward)      Start     Stop Route Frequency Ordered    07/25/24 2100  insulin glargine U-100 (Lantus) pen 22 Units         -- SubQ Nightly 07/25/24 0552    07/25/24 0226  insulin aspart U-100 pen 0-5 Units         -- SubQ Every 6 hours PRN 07/25/24 0126                  GERD (gastroesophageal reflux disease)  Chronic. Stable. Currently asymptomatic. Home medications include PPI/Antacids as needed.  Plan:  -Continue PPI/Antacids as needed       Iron deficiency anemia due to chronic blood loss  Anemia is likely due to chronic blood loss and Iron deficiency. Most recent hemoglobin and hematocrit are listed below.  Recent Labs     07/25/24  0518 07/25/24  2239 07/26/24  0501   HGB 8.6* 8.8* 8.9*   HCT 28.1* 28.7* 29.4*             Benign prostatic hyperplasia with nocturia  -continue proscar and myrbetriq      Coronary artery disease involving native coronary artery of native heart without angina pectoris  Patient with known CAD s/p CABG, which is controlled  monitor for S/Sx of angina/ACS.   -holding aspirin and Xarelto for now    Acquired hypothyroidism  -continue levothyroxine        Essential hypertension  Chronic, controlled. Latest blood pressure and vitals reviewed-     Temp:  [97.5 °F (36.4 °C)-98.2 °F (36.8 °C)]   Pulse:  [61-68]   Resp:  [18]   BP: (109-154)/(60-80)   SpO2:  [98 %-100 %] .   Home meds for hypertension were reviewed and noted below.   Hypertension Medications               carvediloL (COREG) 12.5 MG tablet Take 2 tablets (25 mg total) by mouth 2  (two) times daily with meals.    furosemide (LASIX) 40 MG tablet Take 1 tablet (40 mg total) by mouth 2 (two) times daily.    lisinopriL 10 MG tablet Take 1 tablet by mouth once daily            While in the hospital, will manage blood pressure as follows; Continue home antihypertensive regimen    Will utilize p.r.n. blood pressure medication only if patient's blood pressure greater than 160/100 and he develops symptoms such as worsening chest pain or shortness of breath.      VTE Risk Mitigation (From admission, onward)           Ordered     IP VTE HIGH RISK PATIENT  Once         07/25/24 0124     Place sequential compression device  Until discontinued         07/25/24 0124     Reason for No Pharmacological VTE Prophylaxis  Once        Question:  Reasons:  Answer:  Active Bleeding    07/25/24 0124                    Discharge Planning   KOURTNEY:      Code Status: Full Code   Is the patient medically ready for discharge?:     Reason for patient still in hospital (select all that apply): Patient trending condition, Treatment, and Consult recommendations  Discharge Plan A: Home                  Magalis Machado MD  Department of Hospital Medicine   '[facility] Tele[facility] ([facility])

## 2024-07-26 NOTE — ASSESSMENT & PLAN NOTE
Chronic, controlled. Latest blood pressure and vitals reviewed-     Temp:  [97.5 °F (36.4 °C)-98.2 °F (36.8 °C)]   Pulse:  [61-68]   Resp:  [18]   BP: (109-154)/(60-80)   SpO2:  [98 %-100 %] .   Home meds for hypertension were reviewed and noted below.   Hypertension Medications               carvediloL (COREG) 12.5 MG tablet Take 2 tablets (25 mg total) by mouth 2 (two) times daily with meals.    furosemide (LASIX) 40 MG tablet Take 1 tablet (40 mg total) by mouth 2 (two) times daily.    lisinopriL 10 MG tablet Take 1 tablet by mouth once daily            While in the hospital, will manage blood pressure as follows; Continue home antihypertensive regimen    Will utilize p.r.n. blood pressure medication only if patient's blood pressure greater than 160/100 and he develops symptoms such as worsening chest pain or shortness of breath.

## 2024-07-27 ENCOUNTER — ANESTHESIA (OUTPATIENT)
Dept: ENDOSCOPY | Facility: HOSPITAL | Age: 79
End: 2024-07-27
Payer: MEDICARE

## 2024-07-27 VITALS
HEIGHT: 67 IN | SYSTOLIC BLOOD PRESSURE: 131 MMHG | OXYGEN SATURATION: 97 % | WEIGHT: 215.38 LBS | DIASTOLIC BLOOD PRESSURE: 63 MMHG | TEMPERATURE: 98 F | RESPIRATION RATE: 18 BRPM | BODY MASS INDEX: 33.8 KG/M2 | HEART RATE: 75 BPM

## 2024-07-27 DIAGNOSIS — K55.21 AVM (ARTERIOVENOUS MALFORMATION) OF SMALL BOWEL, ACQUIRED WITH HEMORRHAGE: Primary | ICD-10-CM

## 2024-07-27 PROBLEM — K92.1 MELENA: Status: RESOLVED | Noted: 2024-01-16 | Resolved: 2024-07-27

## 2024-07-27 LAB
ANION GAP SERPL CALC-SCNC: 9 MMOL/L (ref 8–16)
BASOPHILS # BLD AUTO: 0.03 K/UL (ref 0–0.2)
BASOPHILS NFR BLD: 0.8 % (ref 0–1.9)
BUN SERPL-MCNC: 9 MG/DL (ref 8–23)
CALCIUM SERPL-MCNC: 8.3 MG/DL (ref 8.7–10.5)
CHLORIDE SERPL-SCNC: 107 MMOL/L (ref 95–110)
CO2 SERPL-SCNC: 25 MMOL/L (ref 23–29)
CREAT SERPL-MCNC: 1.1 MG/DL (ref 0.5–1.4)
DIFFERENTIAL METHOD BLD: ABNORMAL
EOSINOPHIL # BLD AUTO: 0.1 K/UL (ref 0–0.5)
EOSINOPHIL NFR BLD: 2.2 % (ref 0–8)
ERYTHROCYTE [DISTWIDTH] IN BLOOD BY AUTOMATED COUNT: 22.4 % (ref 11.5–14.5)
EST. GFR  (NO RACE VARIABLE): >60 ML/MIN/1.73 M^2
GLUCOSE SERPL-MCNC: 85 MG/DL (ref 70–110)
HCT VFR BLD AUTO: 30 % (ref 40–54)
HGB BLD-MCNC: 9.2 G/DL (ref 14–18)
IMM GRANULOCYTES # BLD AUTO: 0.05 K/UL (ref 0–0.04)
IMM GRANULOCYTES NFR BLD AUTO: 1.3 % (ref 0–0.5)
INR PPP: 1 (ref 0.8–1.2)
LYMPHOCYTES # BLD AUTO: 0.9 K/UL (ref 1–4.8)
LYMPHOCYTES NFR BLD: 23.4 % (ref 18–48)
MCH RBC QN AUTO: 28 PG (ref 27–31)
MCHC RBC AUTO-ENTMCNC: 30.7 G/DL (ref 32–36)
MCV RBC AUTO: 92 FL (ref 82–98)
MONOCYTES # BLD AUTO: 0.4 K/UL (ref 0.3–1)
MONOCYTES NFR BLD: 11.8 % (ref 4–15)
NEUTROPHILS # BLD AUTO: 2.3 K/UL (ref 1.8–7.7)
NEUTROPHILS NFR BLD: 60.5 % (ref 38–73)
NRBC BLD-RTO: 1 /100 WBC
PLATELET # BLD AUTO: 193 K/UL (ref 150–450)
PMV BLD AUTO: 10 FL (ref 9.2–12.9)
POCT GLUCOSE: 101 MG/DL (ref 70–110)
POCT GLUCOSE: 156 MG/DL (ref 70–110)
POCT GLUCOSE: 240 MG/DL (ref 70–110)
POTASSIUM SERPL-SCNC: 4 MMOL/L (ref 3.5–5.1)
PROTHROMBIN TIME: 11.9 SEC (ref 9–12.5)
RBC # BLD AUTO: 3.28 M/UL (ref 4.6–6.2)
SODIUM SERPL-SCNC: 141 MMOL/L (ref 136–145)
WBC # BLD AUTO: 3.72 K/UL (ref 3.9–12.7)

## 2024-07-27 PROCEDURE — 25000003 PHARM REV CODE 250: Performed by: INTERNAL MEDICINE

## 2024-07-27 PROCEDURE — 27200959 HC CATHETER, ERBE, ARGON PLASMA: Performed by: INTERNAL MEDICINE

## 2024-07-27 PROCEDURE — 0DBL8ZZ EXCISION OF TRANSVERSE COLON, VIA NATURAL OR ARTIFICIAL OPENING ENDOSCOPIC: ICD-10-PCS | Performed by: INTERNAL MEDICINE

## 2024-07-27 PROCEDURE — 44366 SMALL BOWEL ENDOSCOPY: CPT | Mod: ,,, | Performed by: INTERNAL MEDICINE

## 2024-07-27 PROCEDURE — 37000009 HC ANESTHESIA EA ADD 15 MINS: Performed by: INTERNAL MEDICINE

## 2024-07-27 PROCEDURE — 45380 COLONOSCOPY AND BIOPSY: CPT | Performed by: INTERNAL MEDICINE

## 2024-07-27 PROCEDURE — 63600175 PHARM REV CODE 636 W HCPCS: Performed by: NURSE ANESTHETIST, CERTIFIED REGISTERED

## 2024-07-27 PROCEDURE — 0W3P8ZZ CONTROL BLEEDING IN GASTROINTESTINAL TRACT, VIA NATURAL OR ARTIFICIAL OPENING ENDOSCOPIC: ICD-10-PCS | Performed by: INTERNAL MEDICINE

## 2024-07-27 PROCEDURE — 0DBH8ZZ EXCISION OF CECUM, VIA NATURAL OR ARTIFICIAL OPENING ENDOSCOPIC: ICD-10-PCS | Performed by: INTERNAL MEDICINE

## 2024-07-27 PROCEDURE — 88305 TISSUE EXAM BY PATHOLOGIST: CPT | Mod: 59 | Performed by: STUDENT IN AN ORGANIZED HEALTH CARE EDUCATION/TRAINING PROGRAM

## 2024-07-27 PROCEDURE — 0DBN8ZZ EXCISION OF SIGMOID COLON, VIA NATURAL OR ARTIFICIAL OPENING ENDOSCOPIC: ICD-10-PCS | Performed by: INTERNAL MEDICINE

## 2024-07-27 PROCEDURE — 44366 SMALL BOWEL ENDOSCOPY: CPT | Performed by: INTERNAL MEDICINE

## 2024-07-27 PROCEDURE — 25000003 PHARM REV CODE 250: Performed by: NURSE PRACTITIONER

## 2024-07-27 PROCEDURE — 80048 BASIC METABOLIC PNL TOTAL CA: CPT | Performed by: INTERNAL MEDICINE

## 2024-07-27 PROCEDURE — 36415 COLL VENOUS BLD VENIPUNCTURE: CPT | Performed by: INTERNAL MEDICINE

## 2024-07-27 PROCEDURE — 85025 COMPLETE CBC W/AUTO DIFF WBC: CPT | Performed by: INTERNAL MEDICINE

## 2024-07-27 PROCEDURE — 27201012 HC FORCEPS, HOT/COLD, DISP: Performed by: INTERNAL MEDICINE

## 2024-07-27 PROCEDURE — 63600175 PHARM REV CODE 636 W HCPCS: Performed by: INTERNAL MEDICINE

## 2024-07-27 PROCEDURE — 45380 COLONOSCOPY AND BIOPSY: CPT | Mod: 51,,, | Performed by: INTERNAL MEDICINE

## 2024-07-27 PROCEDURE — 85610 PROTHROMBIN TIME: CPT | Performed by: INTERNAL MEDICINE

## 2024-07-27 PROCEDURE — 37000008 HC ANESTHESIA 1ST 15 MINUTES: Performed by: INTERNAL MEDICINE

## 2024-07-27 RX ORDER — SODIUM CHLORIDE, SODIUM LACTATE, POTASSIUM CHLORIDE, CALCIUM CHLORIDE 600; 310; 30; 20 MG/100ML; MG/100ML; MG/100ML; MG/100ML
INJECTION, SOLUTION INTRAVENOUS CONTINUOUS PRN
Status: DISCONTINUED | OUTPATIENT
Start: 2024-07-27 | End: 2024-07-27

## 2024-07-27 RX ORDER — PANTOPRAZOLE SODIUM 40 MG/1
40 TABLET, DELAYED RELEASE ORAL
Status: DISCONTINUED | OUTPATIENT
Start: 2024-07-27 | End: 2024-07-27 | Stop reason: HOSPADM

## 2024-07-27 RX ORDER — DEXTROMETHORPHAN/PSEUDOEPHED 2.5-7.5/.8
DROPS ORAL
Status: COMPLETED | OUTPATIENT
Start: 2024-07-27 | End: 2024-07-27

## 2024-07-27 RX ORDER — PHENYLEPHRINE HYDROCHLORIDE 10 MG/ML
INJECTION INTRAVENOUS
Status: DISCONTINUED | OUTPATIENT
Start: 2024-07-27 | End: 2024-07-27

## 2024-07-27 RX ORDER — PROPOFOL 10 MG/ML
VIAL (ML) INTRAVENOUS
Status: DISCONTINUED | OUTPATIENT
Start: 2024-07-27 | End: 2024-07-27

## 2024-07-27 RX ADMIN — PROPOFOL 50 MG: 10 INJECTION, EMULSION INTRAVENOUS at 07:07

## 2024-07-27 RX ADMIN — FINASTERIDE 5 MG: 5 TABLET, FILM COATED ORAL at 08:07

## 2024-07-27 RX ADMIN — PANTOPRAZOLE SODIUM 40 MG: 40 TABLET, DELAYED RELEASE ORAL at 08:07

## 2024-07-27 RX ADMIN — RANOLAZINE 1000 MG: 500 TABLET, EXTENDED RELEASE ORAL at 08:07

## 2024-07-27 RX ADMIN — PROPOFOL 120 MG: 10 INJECTION, EMULSION INTRAVENOUS at 07:07

## 2024-07-27 RX ADMIN — SODIUM CHLORIDE, SODIUM LACTATE, POTASSIUM CHLORIDE, AND CALCIUM CHLORIDE: 600; 310; 30; 20 INJECTION, SOLUTION INTRAVENOUS at 06:07

## 2024-07-27 RX ADMIN — OXYBUTYNIN CHLORIDE 5 MG: 5 TABLET, EXTENDED RELEASE ORAL at 08:07

## 2024-07-27 RX ADMIN — LISINOPRIL 10 MG: 10 TABLET ORAL at 08:07

## 2024-07-27 RX ADMIN — PROPAFENONE HYDROCHLORIDE 150 MG: 150 TABLET, FILM COATED ORAL at 01:07

## 2024-07-27 RX ADMIN — PHENYLEPHRINE HYDROCHLORIDE 100 MCG: 10 INJECTION INTRAVENOUS at 07:07

## 2024-07-27 RX ADMIN — PROPOFOL 20 MG: 10 INJECTION, EMULSION INTRAVENOUS at 07:07

## 2024-07-27 RX ADMIN — CARVEDILOL 25 MG: 12.5 TABLET, FILM COATED ORAL at 08:07

## 2024-07-27 RX ADMIN — PROPAFENONE HYDROCHLORIDE 150 MG: 150 TABLET, FILM COATED ORAL at 06:07

## 2024-07-27 RX ADMIN — LEVOTHYROXINE SODIUM 300 MCG: 150 TABLET ORAL at 06:07

## 2024-07-27 RX ADMIN — INSULIN ASPART 2 UNITS: 100 INJECTION, SOLUTION INTRAVENOUS; SUBCUTANEOUS at 11:07

## 2024-07-27 NOTE — ANESTHESIA PREPROCEDURE EVALUATION
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 He is on Xarelto and ASA at home.  He was admitted for the same last week. Cardiology saw him, he was treated symptomatically and discharged. He says this episode came about ten minutes into weed eating. The pain and SOB improved after he stopped and rested. He said he felt fine earlier the same day while at physical therapy and riding the bike.  In the ER, he was noted to have a Hgb of 7.5. He has a history of chronic anemia with a baseline Hgb around 9. He reports having black stools. PLT normal. INR 1.4. BUN 28 and creatinine 1.5.   He's had an extensive GI evaluation with multiple EGDs, a colonoscopy and VCE. He had SMA stenting 01/05/2024 after coloscopy revealed ulcers.       Vitals:    07/26/24 2000   BP:    Pulse: 68   Resp:    Temp:        Patient Active Problem List   Diagnosis    Essential hypertension    BMI 36.0-36.9,adult    PVC (premature ventricular contraction)    Acquired hypothyroidism    Coronary artery disease involving native coronary artery of native heart without angina pectoris    Dryness, eye - Both Eyes    Epiretinal membrane - Both Eyes    PVD (peripheral vascular disease)    ED (erectile dysfunction)    Anorectal fistula    Compensated HCV cirrhosis    Benign prostatic hyperplasia with nocturia    DELONTE on CPAP    Mild intermittent asthma without complication    Pseudophakia    Colon polyps    Anal fissure    Secondary esophageal varices without bleeding    Asbestos exposure    Chest pain, moderate coronary artery risk    Nonrheumatic aortic valve stenosis    Urethral polyp    Exertional angina    Left main coronary artery disease    Mixed hyperlipidemia    S/P CABG x 2    Pain in both  lower extremities    Chronic pansinusitis    Nasal polyposis    Non-seasonal allergic rhinitis due to pollen    Iron deficiency anemia due to chronic blood loss    GERD (gastroesophageal reflux disease)    HCC (hepatocellular carcinoma)    DNR (do not resuscitate)    Open angle with borderline findings and low glaucoma risk in both eyes    Aortic atherosclerosis    Type 2 diabetes mellitus with microalbuminuria, with long-term current use of insulin    Bilateral carotid artery stenosis    Paroxysmal atrial fibrillation    Statin myopathy    Dizziness    Lower abdominal pain    Mesenteric artery stenosis    Occlusion of superior mesenteric artery    Melena    Anticoagulants causing adverse effect in therapeutic use    Class 2 severe obesity due to excess calories with serious comorbidity and body mass index (BMI) of 37.0 to 37.9 in adult    Chest pain    Left leg numbness    Episode of transient neurologic symptoms    Anemia    Coronary artery disease with exertional angina    MARCUS (dyspnea on exertion)    Symptomatic anemia    LESTER (acute kidney injury)     Past Medical History:   Diagnosis Date    Anticoagulant long-term use     Aortic stenosis     Asthma     Benign prostatic hyperplasia with nocturia     Bilateral carotid artery stenosis 07/21/2021     CAROTID BILATERAL 07/14/2021 IMPRESSION: Atherosclerosis with suspected stenosis greater than 50% at the right proximal ICA visually. Velocities are discordant and appear improved from prior. Atherosclerosis on the left with no evidence of flow-limiting stenosis greater than 50%.  FINDINGS: Right: Internal Carotid Artery (ICA): Peak systolic velocity 118 cm/sec. End diastolic velocity 35 cm/sec    BPH (benign prostatic hyperplasia)     CAD (coronary artery disease)     40% lesion 2002;lazo    Cirrhosis     Claudication 04/09/2014    Colon polyp     COPD (chronic obstructive pulmonary disease)     ED (erectile dysfunction)     Encounter for blood transfusion      Ex-smoker     Hearing loss NEC     Hepatitis C     Cured;reji brown    Hyperlipidemia     Hypertension     Hypothyroid     s/p tx graves    Open angle with borderline findings and low glaucoma risk in both eyes 09/22/2020    DELONTE on CPAP     Osteoarthritis     Paroxysmal atrial fibrillation     PVD (peripheral vascular disease)     Secondary esophageal varices without bleeding 06/26/2017    Type 2 diabetes mellitus      Past Surgical History:   Procedure Laterality Date    ABLATION OF ARRHYTHMOGENIC FOCUS FOR ATRIAL FIBRILLATION N/A 6/24/2024    Procedure: Ablation atrial fibrillation;  Surgeon: Horacio Huerta MD;  Location: [facility] EP LAB;  Service: Cardiology;  Laterality: N/A;  afib, PVI, RFA, BERNARD (cx if SR), ZIA, anes, MB, 3 Prep, 3 hours per Dr. Huerta    ANGIOGRAM, EXTREMITY, UNILATERAL Left 1/4/2024    Procedure: ANGIOGRAM, EXTREMITY, UNILATERAL- Femoral / SMS Stent, Poss General;  Surgeon: ALEX Alfred III, MD;  Location: [facility] OR [address] [place] [address];  Service: Vascular;  Laterality: Left;  mGy : 2698.78  Gy.cm : 229.88  Contrast : 62ml  Fluro time : 13.8min    CARDIAC CATHETERIZATION      CARDIAC SURGERY      CARPAL TUNNEL RELEASE Left     CATARACT EXTRACTION      CATARACT EXTRACTION W/ INTRAOCULAR LENS  IMPLANT, BILATERAL  2008    CATHETERIZATION OF BOTH LEFT AND RIGHT HEART N/A 11/2/2018    Procedure: CATHETERIZATION, HEART, BOTH LEFT AND RIGHT;  Surgeon: Frank Gallardo MD;  Location: [facility] CATH LAB;  Service: Cardiology;  Laterality: N/A;    COLONOSCOPY  8/2013    COLONOSCOPY N/A 5/30/2016    Procedure: COLONOSCOPY;  Surgeon: Anna Tomas MD;  Location: [facility] ENDO;  Service: Endoscopy;  Laterality: N/A;    COLONOSCOPY N/A 7/17/2019    Procedure: COLONOSCOPY;  Surgeon: David Carter III, MD;  Location: [facility] ENDO;  Service: Endoscopy;  Laterality: N/A;    COLONOSCOPY N/A 12/14/2023    Procedure: COLONOSCOPY;  Surgeon: Ama Barrera MD;  Location: [facility];  Service: Endoscopy;   Laterality: N/A;    COMPUTED TOMOGRAPHY N/A 9/9/2019    Procedure: CT (COMPUTED TOMOGRAPHY);  Surgeon: Sri Diagnostic Provider;  Location: [facility] OR;  Service: General;  Laterality: N/A;  Microwave ablation to be done in CT.  Need CRNA and cart    COMPUTED TOMOGRAPHY N/A 1/25/2022    Procedure: Liver Microwave Ablation;  Surgeon: Red Puentes MD;  Location: [facility];  Service: General;  Laterality: N/A;    CORONARY ARTERY BYPASS GRAFT (CABG) N/A 11/5/2018    Procedure: CORONARY ARTERY BYPASS GRAFT (CABG);  Surgeon: Bear De Luna MD;  Location: [facility];  Service: Cardiothoracic;  Laterality: N/A;  TWO VESSEL BYPASS WITH AORTOTOMY AND EXCISION OF ATHEROSCLEROTIC PLAQUE    CORONARY BYPASS GRAFT ANGIOGRAPHY  3/2/2020    Procedure: Bypass graft study;  Surgeon: Cora Cormier MD;  Location: [facility] CATH LAB;  Service: Cardiology;;    ECHOCARDIOGRAM,TRANSESOPHAGEAL N/A 6/24/2024    Procedure: Transesophageal echo (BERNARD) intra-procedure log documentation;  Surgeon: Nacho Downs MD;  Location: [facility] EP LAB;  Service: Cardiology;  Laterality: N/A;    ELBOW BURSA SURGERY Right 2010    ENDOSCOPIC HARVEST OF VEIN Left 11/5/2018    Procedure: SURGICAL PROCUREMENT, VEIN, ENDOSCOPIC;  Surgeon: Bear De Luna MD;  Location: [facility];  Service: Cardiothoracic;  Laterality: Left;    ESOPHAGOGASTRODUODENOSCOPY N/A 7/17/2019    Procedure: ESOPHAGOGASTRODUODENOSCOPY (EGD);  Surgeon: David Carter III, MD;  Location: [facility];  Service: Endoscopy;  Laterality: N/A;    ESOPHAGOGASTRODUODENOSCOPY N/A 12/29/2022    Procedure: ESOPHAGOGASTRODUODENOSCOPY (EGD);  Surgeon: Issa Gayle MD;  Location: [facility] ENDO;  Service: Endoscopy;  Laterality: N/A;    ESOPHAGOGASTRODUODENOSCOPY N/A 1/17/2024    Procedure: EGD (ESOPHAGOGASTRODUODENOSCOPY);  Surgeon: Issa Gayle MD;  Location: [facility] ENDO;  Service: Endoscopy;  Laterality: N/A;    ESOPHAGOGASTRODUODENOSCOPY N/A 4/30/2024    Procedure: EGD  (ESOPHAGOGASTRODUODENOSCOPY);  Surgeon: Eder Foley MD;  Location: [facility] ENDO;  Service: Gastroenterology;  Laterality: N/A;    ETHMOIDECTOMY Bilateral 3/27/2019    Procedure: ETHMOIDECTOMY;  Surgeon: Alejandro Parkinson MD;  Location: [facility] OR;  Service: ENT;  Laterality: Bilateral;    EYE SURGERY      FOOT SURGERY      FRONTAL SINUS OBLITERATION Bilateral 3/27/2019    Procedure: SINUSOTOMY, FRONTAL SINUS, OBLITERATIVE;  Surgeon: Alejandro Parkinson MD;  Location: [facility] OR;  Service: ENT;  Laterality: Bilateral;    FUNCTIONAL ENDOSCOPIC SINUS SURGERY (FESS) Bilateral 3/27/2019    Procedure: FESS (FUNCTIONAL ENDOSCOPIC SINUS SURGERY);  Surgeon: Alejandro Parkinson MD;  Location: [facility] OR;  Service: ENT;  Laterality: Bilateral;  Left Keila bullosa resection     INTRALUMINAL GASTROINTESTINAL TRACT IMAGING VIA CAPSULE N/A 2/2/2024    Procedure: IMAGING PROCEDURE, GI TRACT, INTRALUMINAL, VIA CAPSULE;  Surgeon: Cooper Estrella RN;  Location: [facility] ENDO;  Service: Endoscopy;  Laterality: N/A;    KNEE ARTHROSCOPY W/ MENISCAL REPAIR Left 06/2019    dr boykin    KNEE SURGERY Right     LEFT HEART CATHETERIZATION Left 3/2/2020    Procedure: CATHETERIZATION, HEART, LEFT;  Surgeon: Cora Cormier MD;  Location: [facility] CATH LAB;  Service: Cardiology;  Laterality: Left;    LIVER BIOPSY  01/2022    MAXILLARY ANTROSTOMY Bilateral 3/27/2019    Procedure: MAXILLARY ANTROSTOMY;  Surgeon: Alejandro Parkinson MD;  Location: [facility] OR;  Service: ENT;  Laterality: Bilateral;    NASAL SEPTOPLASTY N/A 3/27/2019    Procedure: SEPTOPLASTY, NOSE;  Surgeon: Alejandro Parkinson MD;  Location: [facility] OR;  Service: ENT;  Laterality: N/A;    RECTAL SURGERY  2012    STENT, SUPERIOR MESENTERIC ARTERY Left 1/4/2024    Procedure: STENT, SUPERIOR MESENTERIC ARTERY;  Surgeon: ALEX Alfred III, MD;  Location: [address];  Service: Vascular;  Laterality: Left;    TRANSURETHRAL RESECTION OF PROSTATE (TURP) WITHOUT USE OF LASER N/A 6/19/2018    Procedure: TURP, WITHOUT USING LASER;  Surgeon:  Cooper Cordova IV, MD;  Location: [facility];  Service: Urology;  Laterality: N/A;    TRIGGER FINGER RELEASE Left      ECHO: (3/25/24)  Summary         BERNARD prior to ablation. No LA/LEO thrombus visualized.    Left Ventricle: The left ventricle is normal in size. Normal wall thickness. There is low normal systolic function with a visually estimated ejection fraction of 50 - 55%.    Right Ventricle: Normal right ventricular cavity size. Wall thickness is normal. Systolic function is normal.    Left Atrium: Left atrium is dilated. There is no thrombus in the left atrial cavity.    Right Atrium: Right atrium is dilated.    Aortic Valve: The aortic valve is a trileaflet valve. There is moderate aortic valve sclerosis. There is mild annular calcification present. Moderately restricted motion. There is moderate stenosis. There is trace aortic regurgitation.    Mitral Valve: There is mild mitral annular calcification present. There is mild regurgitation.    Tricuspid Valve: The tricuspid valve is structurally normal. There is mild regurgitation.    Aorta: Grade 3 atherosclerosis of the descending aorta and in the aortic arch.    Pericardium: There is no pericardial effusion.    Pre-op Assessment    I have reviewed the Patient Summary Reports.    I have reviewed the NPO Status.   I have reviewed the Medications.     Review of Systems  Anesthesia Hx:   History of prior surgery of interest to airway management or planning:  Previous anesthesia: MAC, General           Social:  Former Smoker       Hematology/Oncology:    Oncology Normal    -- Anemia:               Hematology Comments: 8.9/29.4                    Cardiovascular:     Hypertension   CAD      Angina CHF     MARCUS  ECG has been reviewed. Sinus rhythm with 1st degree A-V block with Premature atrial complexes   Otherwise normal ECG   When compared with ECG of 17-JUL-2024 08:21,   No significant change was found   Confirmed by IGOR DEE, GERA ([id number]) on 7/26/2024 8:40:42 PM                             Pulmonary:   COPD Asthma  Shortness of breath  Sleep Apnea                Renal/:  Chronic Renal Disease, ARF                Hepatic/GI:     GERD Liver Disease, Hepatitis           Musculoskeletal:  Arthritis               Neurological:  Neurology Normal                                      Endocrine:  Diabetes, type 2 Hypothyroidism  Bmi 36      Obesity / BMI > 30      Chemistry        Component Value Date/Time     07/25/2024 0518    K 4.1 07/25/2024 0518     07/25/2024 0518    CO2 25 07/25/2024 0518    BUN 26 (H) 07/25/2024 0518    CREATININE 1.2 07/25/2024 0518    GLU 88 07/25/2024 0518        Component Value Date/Time    CALCIUM 8.8 07/25/2024 0518    ALKPHOS 63 07/24/2024 2152    AST 16 07/24/2024 2152    ALT 14 07/24/2024 2152    BILITOT 0.2 07/24/2024 2152    ESTGFRAFRICA >60.0 06/28/2022 0756    EGFRNONAA >60.0 06/28/2022 0756        Lab Results   Component Value Date    WBC 4.29 07/26/2024    HGB 8.9 (L) 07/26/2024    HCT 29.4 (L) 07/26/2024    MCV 91 07/26/2024     07/26/2024              Anesthesia Plan  Type of Anesthesia, risks & benefits discussed:    Anesthesia Type: MAC  Intra-op Monitoring Plan: Standard ASA Monitors  Post Op Pain Control Plan: multimodal analgesia and IV/PO Opioids PRN  Induction:  IV  Airway Plan: Direct  Informed Consent: Informed consent signed with the Patient and all parties understand the risks and agree with anesthesia plan.  All questions answered. Patient consented to blood products? Yes  ASA Score: 3  Day of Surgery Review of History & Physical: H&P Update referred to the surgeon/provider.  Anesthesia Plan Notes: Airway - Non-Surgical Present Prior to Hospital Arrival? Yes; Placement Date 11/05/18; Placement Time 0732; Method of Intubation Direct laryngoscopy; Inserted by CRNA; Airway Device Endotracheal Tube; Mask Ventilation Easy; Intubated Postinduction; Blade Beauchamp #2; Airway Device Size 8.0; Style Cuffed; Cuff  Inflation Minimal occlusive pressure; Placement Verified by Auscultation, Capnometry; Grade Grade II; Complicating Factors None; Intubation Findings Positive EtCO2, Bilateral breath sounds, Atraumatic/Condition of teeth unchanged; Securement Lips; Complications None; Breath Sounds Equal Bilateral; Insertion Attempts 1; Removal Date 11/05/18; Removal Time 1452; Removal Indication & Assessment removed per order; Name of Person who Removed Van RT    Ready For Surgery From Anesthesia Perspective.     .

## 2024-07-27 NOTE — PLAN OF CARE
[facility] - Telemetry (Hospital)  Discharge Final Note    Primary Care Provider: Bhupinder Callaway MD    Expected Discharge Date: 7/27/2024    Final Discharge Note (most recent)       Final Note - 07/27/24 1511          Final Note    Assessment Type Final Discharge Note     Anticipated Discharge Disposition Home or Self Care        Post-Acute Status    Discharge Delays None known at this time                   No needs a time of chart review. KM,MSW    Important Message from Medicare             Contact Info       Bhupinder Callaway MD   Specialty: Internal Medicine, Pediatrics   Relationship: PCP - General    [address] [facility] [place] [address]   Phone: [phone]       Next Steps: Follow up

## 2024-07-27 NOTE — PROVATION PATIENT INSTRUCTIONS
Discharge Summary/Instructions after an Endoscopic Procedure  Patient Name: Erendira Pretty  Patient MRN: [id number]  Patient YOB: 1945 Saturday, July 27, 2024 Ama Barrera MD  Dear patient,  As a result of recent federal legislation (The Federal Cures Act), you may   receive lab or pathology results from your procedure in your [facility]   account before your physician is able to contact you. Your physician or   their representative will relay the results to you with their   recommendations at their soonest availability.  Thank you,  RESTRICTIONS:  During your procedure today, you received medications for sedation.  These   medications may affect your judgment, balance and coordination.  Therefore,   for 24 hours, you have the following restrictions:   - DO NOT drive a car, operate machinery, make legal/financial decisions,   sign important papers or drink alcohol.    ACTIVITY:  Today: no heavy lifting, straining or running due to procedural   sedation/anesthesia.  The following day: return to full activity including work.  DIET:  Eat and drink normally unless instructed otherwise.     TREATMENT FOR COMMON SIDE EFFECTS:  - Mild abdominal pain, nausea, belching, bloating or excessive gas:  rest,   eat lightly and use a heating pad.  - Sore Throat: treat with throat lozenges and/or gargle with warm salt   water.  - Because air was used during the procedure, expelling large amounts of air   from your rectum or belching is normal.  - If a bowel prep was taken, you may not have a bowel movement for 1-3 days.    This is normal.  SYMPTOMS TO WATCH FOR AND REPORT TO YOUR PHYSICIAN:  1. Abdominal pain or bloating, other than gas cramps.  2. Chest pain.  3. Back pain.  4. Signs of infection such as: chills or fever occurring within 24 hours   after the procedure.  5. Rectal bleeding, which would show as bright red, maroon, or black stools.   (A tablespoon of blood from the rectum is not serious, especially if    hemorrhoids are present.)  6. Vomiting.  7. Weakness or dizziness.  GO DIRECTLY TO THE NEAREST EMERGENCY ROOM IF YOU HAVE ANY OF THE FOLLOWING:      Difficulty breathing              Chills and/or fever over 101 F   Persistent vomiting and/or vomiting blood   Severe abdominal pain   Severe chest pain   Black, tarry stools   Bleeding- more than one tablespoon   Any other symptom or condition that you feel may need urgent attention  Your doctor recommends these additional instructions:  If any biopsies were taken, your doctors clinic will contact you in 1 to 2   weeks with any results.  - Return patient to [facility] for possible discharge same day.   - Cardiac diet.   - Continue present medications.   - Await pathology results.   - Repeat colonoscopy in 5 years for surveillance based on pathology results.     - The findings and recommendations were discussed with the patient's family.     - Communicated with hospital medicine service regarding findings.  For questions, problems or results please call your physician Ama Barrera MD at Work:  [phone]  If you have any questions about the above instructions, call the GI   department at [phone] or call the endoscopy unit at [phone]   from 7am until 3 pm.  [facility], EMERGENCY ROOM PHONE NUMBER:   [phone]  IF A COMPLICATION OR EMERGENCY SITUATION ARISES AND YOU ARE UNABLE TO REACH   YOUR PHYSICIAN - GO DIRECTLY TO THE EMERGENCY ROOM.  I have read or have had read to me these discharge instructions for my   procedure and have received a written copy.  I understand these   instructions and will follow-up with my physician if I have any questions.     __________________________________       _____________________________________  Nurse Signature                                          Patient/Designated   Responsible Party Signature  MD Ama Linares MD  7/27/2024 8:16:30 AM  This report has been  verified and signed electronically.  Dear patient,  As a result of recent federal legislation (The Federal Cures Act), you may   receive lab or pathology results from your procedure in your [facility]   account before your physician is able to contact you. Your physician or   their representative will relay the results to you with their   recommendations at their soonest availability.  Thank you,  PROVATION

## 2024-07-27 NOTE — TRANSFER OF CARE
"Anesthesia Transfer of Care Note    Patient: Erendira Pretty    Procedure(s) Performed: Procedure(s) (LRB):  EGD (ESOPHAGOGASTRODUODENOSCOPY) (N/A)  COLONOSCOPY (N/A)    Patient location: GI    Anesthesia Type: MAC    Transport from OR: Transported from OR on 2-3 L/min O2 by NC with adequate spontaneous ventilation    Post pain: adequate analgesia    Post assessment: no apparent anesthetic complications    Post vital signs: stable    Level of consciousness: sedated    Nausea/Vomiting: no nausea/vomiting    Complications: none    Transfer of care protocol was followed      Last vitals: Visit Vitals  BP (!) 97/59   Pulse 62   Temp 36.8 °C (98.3 °F) (Oral)   Resp 20   Ht 5' 7" (1.702 m)   Wt 97.7 kg (215 lb 6.2 oz)   SpO2 100%   BMI 33.73 kg/m²     "

## 2024-07-27 NOTE — ANESTHESIA POSTPROCEDURE EVALUATION
Anesthesia Post Evaluation    Patient: Erendira Pretty    Procedure(s) Performed: Procedure(s) (LRB):  EGD (ESOPHAGOGASTRODUODENOSCOPY) (N/A)  COLONOSCOPY (N/A)    Final Anesthesia Type: MAC      Patient location during evaluation: GI PACU  Patient participation: Yes- Able to Participate  Level of consciousness: awake and alert  Post-procedure vital signs: reviewed and stable  Pain management: adequate  Airway patency: patent    PONV status at discharge: No PONV  Anesthetic complications: no      Cardiovascular status: blood pressure returned to baseline  Respiratory status: unassisted  Hydration status: euvolemic  Follow-up not needed.              Vitals Value Taken Time   /71 07/27/24 0832   Temp 36.3 °C (97.4 °F) 07/27/24 0832   Pulse 63 07/27/24 0832   Resp 18 07/27/24 0832   SpO2 99 % 07/27/24 0832         Event Time   Out of Recovery 07/27/2024 08:32:59         Pain/Natalie Score: Natalie Score: 10 (7/27/2024  8:18 AM)

## 2024-07-27 NOTE — BRIEF OP NOTE
Inpatient Endoscopy Brief Operative Note      Admit Date: 7/24/2024    Procedure Date and Time:  7/27/2024 8:25 AM    Attending Physician: Magalis Machado MD     Principal Admitting Diagnoses: Symptomatic anemia         Discharge Diagnosis: The primary encounter diagnosis was Melena. Diagnoses of Chest pain, Symptomatic anemia, and LESTER (acute kidney injury) were also pertinent to this visit.     Discharged Condition: Stable    Indication for Admission: Symptomatic anemia     Procedure Performed: Colonoscopy with polypectomy and Small Bowel Enteroscopy with APC    SEE [employer]OVATIONS REPORTS FOR DETAILS.    Pathology (if any):  Specimen (24h ago, onward)       Start     Ordered    07/27/24 0734  Specimen to Pathology, Surgery Gastrointestinal tract  Once        Comments: Pre-op Diagnosis: Symptomatic anemia [D64.9]Procedure(s):EGD (ESOPHAGOGASTRODUODENOSCOPY)COLONOSCOPY Number of specimens: 3Name of specimens: #1 Cecal Polypectomy#2 Transverse Colon Polypectomy#3 Sigmoid Colon Polypectomy     References:    Click here for ordering Quick Tip   Question Answer Comment   Procedure Type: Gastrointestinal tract    Release to patient Immediate        07/27/24 0749                    Estimated Blood Loss: 1 ml.    Discussed with: patient and family.    Disposition: Return to [facility].    Recommendations:   Restart cardiac diet  Hold xarelto for 1 week  ASA ok  Acidphex 20mg BID  Will arrange outpatient VCE  No NSAIDs      Communicated with hospital medicine service regarding the above findings.Ok to discharge       Ama Barrera MD  [facility]patient Gastroenterology  [facility]

## 2024-07-27 NOTE — PROVATION PATIENT INSTRUCTIONS
Discharge Summary/Instructions after an Endoscopic Procedure  Patient Name: Erendira Pretty  Patient MRN: [id number]  Patient YOB: 1945 Saturday, July 27, 2024 Ama Barrera MD  Dear patient,  As a result of recent federal legislation (The Federal Cures Act), you may   receive lab or pathology results from your procedure in your [facility]   account before your physician is able to contact you. Your physician or   their representative will relay the results to you with their   recommendations at their soonest availability.  Thank you,  RESTRICTIONS:  During your procedure today, you received medications for sedation.  These   medications may affect your judgment, balance and coordination.  Therefore,   for 24 hours, you have the following restrictions:   - DO NOT drive a car, operate machinery, make legal/financial decisions,   sign important papers or drink alcohol.    ACTIVITY:  Today: no heavy lifting, straining or running due to procedural   sedation/anesthesia.  The following day: return to full activity including work.  DIET:  Eat and drink normally unless instructed otherwise.     TREATMENT FOR COMMON SIDE EFFECTS:  - Mild abdominal pain, nausea, belching, bloating or excessive gas:  rest,   eat lightly and use a heating pad.  - Sore Throat: treat with throat lozenges and/or gargle with warm salt   water.  - Because air was used during the procedure, expelling large amounts of air   from your rectum or belching is normal.  - If a bowel prep was taken, you may not have a bowel movement for 1-3 days.    This is normal.  SYMPTOMS TO WATCH FOR AND REPORT TO YOUR PHYSICIAN:  1. Abdominal pain or bloating, other than gas cramps.  2. Chest pain.  3. Back pain.  4. Signs of infection such as: chills or fever occurring within 24 hours   after the procedure.  5. Rectal bleeding, which would show as bright red, maroon, or black stools.   (A tablespoon of blood from the rectum is not serious, especially if    hemorrhoids are present.)  6. Vomiting.  7. Weakness or dizziness.  GO DIRECTLY TO THE NEAREST EMERGENCY ROOM IF YOU HAVE ANY OF THE FOLLOWING:      Difficulty breathing              Chills and/or fever over 101 F   Persistent vomiting and/or vomiting blood   Severe abdominal pain   Severe chest pain   Black, tarry stools   Bleeding- more than one tablespoon   Any other symptom or condition that you feel may need urgent attention  Your doctor recommends these additional instructions:  If any biopsies were taken, your doctors clinic will contact you in 1 to 2   weeks with any results.  - Return patient to [facility] after colonoscopy.  - Cardiac diet.   - Use Protonix (pantoprazole) 40 mg PO BID.   - Proceed with colonoscopy.  - To visualize the small bowel, perform video capsule endoscopy. You will be   contacted to schedule this study by our GI department.  For questions, problems or results please call your physician Ama Barrera MD at Work:  [phone]  If you have any questions about the above instructions, call the GI   department at [phone] or call the endoscopy unit at [phone]   from 7am until 3 pm.  [facility] [place], EMERGENCY ROOM PHONE NUMBER:   [phone]  IF A COMPLICATION OR EMERGENCY SITUATION ARISES AND YOU ARE UNABLE TO REACH   YOUR PHYSICIAN - GO DIRECTLY TO THE EMERGENCY ROOM.  I have read or have had read to me these discharge instructions for my   procedure and have received a written copy.  I understand these   instructions and will follow-up with my physician if I have any questions.     __________________________________       _____________________________________  Nurse Signature                                          Patient/Designated   Responsible Party Signature  MD Ama Linares MD  7/27/2024 8:11:43 AM  This report has been verified and signed electronically.  Dear patient,  As a result of recent federal legislation  (The Federal Cures Act), you may   receive lab or pathology results from your procedure in your [facility]   account before your physician is able to contact you. Your physician or   their representative will relay the results to you with their   recommendations at their soonest availability.  Thank you,  PROVATION

## 2024-07-27 NOTE — INTERVAL H&P NOTE
The patient has been examined and the H&P has been reviewed:    I concur with the findings and changes have been noted since the H&P was written: s/p 1U pRBCs since admission. Off Xarelto since 7/24/24. Will plan to repeat VCE if today's endoscopic evaluation negative.     Anesthesia/Surgery risks, benefits and alternative options discussed and understood by patient/family.    The risks, benefits and alternatives of the procedure were discussed with the patient in detail. This discussion was had in the presence of endoscopy staff. The risks include, risks of adverse reaction to sedation requiring the use of reversal agents, bleeding requiring blood transfusion, perforation requiring surgical intervention and technical failure. Other risks include aspiration leading to respiratory distress and respiratory failure resulting in endotracheal intubation and mechanical ventilation including death. If anesthesia is being utilized for this procedure, it is up to the anesthesiologist to determine airway safety including elective endotracheal intubation. Questions were answered, they agree to proceed. There was no language barriers.        Active Hospital Problems    Diagnosis  POA    *Symptomatic anemia [D64.9]  Yes    LESTER (acute kidney injury) [N17.9]  Yes    Melena [K92.1]  Yes    Type 2 diabetes mellitus with microalbuminuria, with long-term current use of insulin [E11.29, R80.9, Z79.4]  Not Applicable     Chronic    Paroxysmal atrial fibrillation [I48.0]  Yes    GERD (gastroesophageal reflux disease) [K21.9]  Yes    Iron deficiency anemia due to chronic blood loss [D50.0]  Yes    Benign prostatic hyperplasia with nocturia [N40.1, R35.1]  Yes     Chronic    Acquired hypothyroidism [E03.9]  Yes     Chronic    Coronary artery disease involving native coronary artery of native heart without angina pectoris [I25.10]  Yes     Chronic     40% lesion 2002;lazo      Essential hypertension [I10]  Yes     Chronic    Compensated  HCV cirrhosis [K74.69, B19.20]  Yes      Resolved Hospital Problems   No resolved problems to display.

## 2024-07-29 ENCOUNTER — TELEPHONE (OUTPATIENT)
Dept: ELECTROPHYSIOLOGY | Facility: CLINIC | Age: 79
End: 2024-07-29
Payer: MEDICARE

## 2024-07-29 ENCOUNTER — PATIENT MESSAGE (OUTPATIENT)
Dept: ENDOSCOPY | Facility: HOSPITAL | Age: 79
End: 2024-07-29
Payer: MEDICARE

## 2024-07-29 NOTE — TELEPHONE ENCOUNTER
----- Message from Horacio Huerta MD sent at 7/29/2024  7:23 AM CDT -----  Regarding: RE: Obscure GI bleed 2/2 AVMs  During watchman implant we anticoagulate the patient with heparin. After watchman implant, there is a 6 week period where we would like anticoagulation.     It sounds like we need to reschedule the watchman which is on for this Friday.    MB  ----- Message -----  From: Ama Barrera MD  Sent: 7/27/2024   9:39 AM CDT  To: Frank Gallardo MD; #  Subject: Obscure GI bleed 2/2 AVMs                        This patient is known to the GI service for obscure GI bleeds since 01/2024.     He has a complex medical history included compensated HCV cirrhosis s/p Harvoni with SVR and HCC s/p ablation in 2019 and 2021. Also has afib and os on ASA/Xarelto. For years he has had EGDs for variceal surveillance. The varices have always been small to trivial. No portal gastropathy. He underwent a colonoscopy in 12/2023 for abdominal pain and found to have a colonic ulcers. SMA stent was placed in 01/2024. In June 2024 he underwent ablation of the pulmonary veins and is scheduled for Watchman's procedure with Dr. Huerta on 8/2/24.    He's had multiple hospitalizations this year for intermittent melena, drop in counts, symptomatic anemia requiring transfusion. At one point Xarelto was held in May 2024 when he saw Dr. Gallardo but restarted after the pulmonary veins ablation in late June 2024. This hospitalization Xarelto was held, transfused 1U pRBC. Push enteroscopy revealed AVMs. They were treated with APC. This is the first time AVMs were found despite multiple EGDs/Colons in the past. Video capsule endoscopy (VCE) earlier this year was normal. I was able to reach the proximal jejunum and the remainder of the exam was normal. Colon with ileal intubation (upto 15cm) was also normal. A few small polyps were removed. I have held Xarelto for 1 week because of the polyps removed and APC applied. He can  continue ASA, cardiac meds, Acidphex and No NSAIDs.     He needs a repeat video capsule and I have ordered this. Wanted to make Dr. Huerta aware of my holding Xarelto. If it needs to be restarted sooner, we can restart on day 5. More importantly, the Watchman's may need to be rescheduled till after the VCE is completed. If positive then he may need a single balloon or double balloon enteroscopy which is available at [facility] or [facility].    Thanks  AN

## 2024-07-29 NOTE — TELEPHONE ENCOUNTER
Patient scheduled for Watchman procedure on 8/2/24. Received message from GI doctor that patient needs to have a repeat video capsule test done. Patient is off of his Xarelto for that test. Dr. Huerta reviewed. Watchman procedure will be cancelled for now. Patient will let me know once the test is done and he can be rescheduled. Patient agrees and verbalized understanding.

## 2024-07-29 NOTE — HOSPITAL COURSE
The patient presented with shortness of breath and melena.He was noted to have hemoglobin of 7.5 which had decreased from 9. He was transfused a unit of pRBCs. He had appropriate response. GI was consulted and planned EGD and colonoscopy 48 hours for dose of Xarelto. He tolerated colonscopy prep. His hemoglobin remained stable. He underwent EGD and colonoscopy. Hold Xarelto until  8/4/24. Continue PPI patient recently had prescription renewed and has a year worth of refills. Outpatient GI follow-up and probable capsule. Patient tolerated oral intake prior to discharge. Patient seen and examined, stable for discharge.

## 2024-07-29 NOTE — DISCHARGE SUMMARY
[facility] Medicine  Discharge Summary      Patient Name: Erendira Pretty  MRN: [id number]  LLOYD: [id number]  Patient Class: IP- Inpatient  Admission Date: 7/24/2024  Hospital Length of Stay: 2 days  Discharge Date and Time: 7/27/2024  4:12 PM  Attending Physician: Dora att. providers found   Discharging Provider: Magalis Machado MD  Primary Care Provider: Bhupinder Callaway MD    Primary Care Team: Networked reference to record PCT     HPI:   Erendira Pretty is a 79 y.o. 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  Presented to the ER for persistent shortness of breath that worsened today which is exacerbated by exertion.  Patient reports he has been having symptoms for the last 6 months.  Patient was hospitalized numerous times for symptomatic anemia requiring blood transfusions and iron infusions.  Patient had pill camera, EGD, and colonoscopy performed without any identifiable source of active bleed.  Patient was taken aspirin, Plavix, and Xarelto, but has since been taken off of Plavix in his only currently taking aspirin and Xarelto.  Patient had an iron infusion performed on 07/23/2024.  Patient was evaluated by his cardiologist (Dr. Gallardo) on 07/17/2024.  Patient is  scheduled to have Watchman procedure performed in the next couple of weeks.  Patient states he has also had associated black colored stools, but this has been ongoing as well.  Denies any passage of blood clots or bright red/dark red blood that feels toilet bowl.  Denies any use of home oxygen.  Denies any actual chest pain or palpitations.  Denies lightheadedness/dizziness/cough, cold, congestion, recent illnesses, sick contacts, or any other symptoms at this time.    ER workup revealed H/H of 7.5/24.7 (previously 9.0/29.4 on 07/16/2024), BUN/creatinine 28/1.5 (previously 13/1.2 on 07/16/2024),  mg/dL, , troponin negative, occult blood stool positive, chest x-ray negative for acute findings, EKG revealed sinus rhythm with first-degree AV block with PACs with a ventricular rate of 72 beats per minute and a QT/QTC of 378/413.  Patient received 40 mg Protonix IV and has 1 unit of PRBCs ordered by ER provider.  Hospital Medicine consulted to admit patient for symptomatic anemia in setting of GI bleed and LESTER.  Patient in agreement with treatment plan.  Patient will be admitted under inpatient status.    PCP: Bhupinder Callaway      Procedure(s) (LRB):  EGD (ESOPHAGOGASTRODUODENOSCOPY) (N/A)  COLONOSCOPY (N/A)      Hospital Course:   The patient presented with shortness of breath and melena.He was noted to have hemoglobin of 7.5 which had decreased from 9. He was transfused a unit of pRBCs. He had appropriate response. GI was consulted and planned EGD and colonoscopy 48 hours for dose of Xarelto. He tolerated colonscopy prep. His hemoglobin remained stable. He underwent EGD and colonoscopy. Hold Xarelto until  8/4/24. Continue PPI patient recently had prescription renewed and has a year worth of refills. Outpatient GI follow-up and probable capsule. Patient tolerated oral intake prior to discharge. Patient seen and examined, stable for discharge.     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] 07/27/2024 Yes    LESTER (acute kidney injury) [N17.9] 07/25/2024 Yes    Type 2 diabetes mellitus with microalbuminuria, with long-term current use of insulin [E11.29, R80.9, Z79.4] 07/21/2021 Not Applicable     Chronic    Paroxysmal atrial fibrillation [I48.0] 07/21/2021 Yes    GERD (gastroesophageal reflux disease) [K21.9] 07/17/2019 Yes    Iron deficiency anemia due to chronic blood loss [D50.0] 05/07/2019 Yes    Benign prostatic hyperplasia with nocturia [N40.1, R35.1]  Yes     Chronic    Acquired hypothyroidism [E03.9]  Yes     Chronic    Coronary artery disease involving native coronary artery of native heart without angina pectoris [I25.10]  Yes     Chronic    Essential hypertension [I10] 07/08/2013 Yes     Chronic    Compensated HCV cirrhosis [K74.69, B19.20] 02/13/2013 Yes      Problems Resolved During this Admission:    Diagnosis Date Noted Date Resolved POA    Melena [K92.1] 01/16/2024 07/27/2024 Yes       Discharged Condition: stable    Disposition: Home or Self Care    Follow Up:   Follow-up Information       Bhupinder Callaway MD Follow up.    Specialties: Internal Medicine, Pediatrics  Contact information:  [id number]  [place] [address] [phone]                           Patient Instructions:      Diet Cardiac     Activity as tolerated       Significant Diagnostic Studies: Radiology:   Imaging Results              X-Ray Chest AP Portable (Final result)  Result time 07/24/24 22:42:22      Final result by Eliz Mcbride MD  (07/24/24 22:42:22)                   Narrative:    EXAM: XR CHEST AP PORTABLE    CLINICAL HISTORY: Chest pain    PRIOR:  05/10/2024    FINDINGS:   Hazy opacity left midlung similar prior given technique differences.  No new pulmonary consolidation or pleural effusion    IMPRESSION:  Stable chest exam    Finalized on: 7/24/2024 10:42 PM By:  Eliz Mcbride MD  [facility]# [id number]      2024-07-24 22:44:26.083    [facility]                                    Endoscopy: Colonoscopy: normal colon with polyps, polypectomy x3 and Gastroscopy: duodenal AVMs s/p APC x4    Pending Diagnostic Studies:       Procedure Component Value Units Date/Time    Specimen to Pathology, Surgery Gastrointestinal tract [[id number]] Collected: 07/27/24 0749    Order Status: Sent Lab Status: In process Updated: 07/29/24 0906    Specimen: Tissue            Medications:  Reconciled Home Medications:      Medication List        CHANGE how you take these medications      budesonide-formoterol 160-4.5 mcg 160-4.5 mcg/actuation Hfaa  Commonly known as: SYMBICORT  INHALE 2 PUFFS INTO THE LUNGS TWO TIMES A DAY.  What changed:   how much to take  how to take this  when to take this     REPATHA SURECLICK 140 mg/mL Pnij  Generic drug: evolocumab  INJECT 140mg subcutaneously every 14 days  What changed: See the new instructions.            CONTINUE taking these medications      ACCU-CHEK GRACE PLUS METER [facility]  Generic drug: blood-glucose meter  AS DIRECTED     ACCU-CHEK FASTCLIX LANCET DRUM Misc  Generic drug: lancets  TEST TWO TIMES A DAY     aspirin 81 MG EC tablet  Commonly known as: ECOTRIN  Take 1 tablet (81 mg total) by mouth once daily.     blood sugar diagnostic Strp  Commonly known as: ACCU-CHEK GRACE PLUS TEST STRP  TEST THREE TIMES A DAY     carvediloL 12.5 MG tablet  Commonly known as: COREG  Take 2 tablets (25 mg total) by mouth 2 (two) times daily with meals.     famotidine 40 MG tablet  Commonly known as: PEPCID  Take 40 mg by mouth once daily.    "  finasteride 5 mg tablet  Commonly known as: PROSCAR  TAKE 1 TABLET BY MOUTH once daily     furosemide 40 MG tablet  Commonly known as: LASIX  Take 1 tablet (40 mg total) by mouth 2 (two) times daily.     * inclisiran 284 mg/1.5 mL Syrg subcutaneous injection  Commonly known as: LEQVIO  Inject 1.5 mLs (284 mg total) into the skin every 3 (three) months.     * inclisiran 284 mg/1.5 mL Syrg subcutaneous injection  Commonly known as: LEQVIO  Inject 1.5 mLs (284 mg total) into the skin every 6 (six) months.     JARDIANCE 25 mg tablet  Generic drug: empagliflozin  TAKE 1 TABLET BY MOUTH EVERY DAY     krill oil 500 mg Cap  Take 1 capsule by mouth Daily.     LANTUS SOLOSTAR U-100 INSULIN 100 unit/mL (3 mL) Inpn pen  Generic drug: insulin glargine U-100 (Lantus)  INJECT 44 UNITS UNDER THE SKIN EVERY EVENING     levothyroxine 300 MCG Tab  Commonly known as: SYNTHROID  Take 1 tablet (300 mcg total) by mouth every morning.     lisinopriL 10 MG tablet  Take 1 tablet by mouth once daily     metFORMIN 500 MG ER 24hr tablet  Commonly known as: GLUCOPHAGE-XR  Take 1 tablet (500 mg total) by mouth 2 (two) times daily with meals.     mirabegron 25 mg Tb24 ER tablet  Commonly known as: MYRBETRIQ  Take 1 tablet (25 mg total) by mouth once daily.     montelukast 10 mg tablet  Commonly known as: SINGULAIR  Take 1 tablet (10 mg total) by mouth once daily.     OSTEO BI-FLEX ORAL  Take 1 tablet by mouth 2 (two) times daily.     pen needle, diabetic 31 gauge x 3/16" Ndle  Commonly known as: BD ULTRA-FINE MINI PEN NEEDLE  USE AS DIRECTED     propafenone 150 MG Tab  Commonly known as: RHTHYMOL  Take 1 tablet (150 mg total) by mouth every 8 (eight) hours.     PROVENTIL HFA 90 mcg/actuation inhaler  Generic drug: albuterol  Inhale 2 puffs into the lungs every 6 (six) hours as needed for Wheezing. Rescue     RABEprazole 20 mg tablet  Commonly known as: ACIPHEX  Take 1 tablet (20 mg total) by mouth 2 (two) times daily.     ranolazine 1,000 mg " Tb12  Commonly known as: RANEXA  Take 1 tablet (1,000 mg total) by mouth 2 (two) times daily.     sildenafiL 100 MG tablet  Commonly known as: VIAGRA  Take 1/2 to 1 tablet by mouth one hour prior to intercourse. Max 100mg per day     XARELTO 20 mg Tab  Generic drug: rivaroxaban  Take 20 mg by mouth daily with dinner or evening meal.           * This list has 2 medication(s) that are the same as other medications prescribed for you. Read the directions carefully, and ask your doctor or other care provider to review them with you.                  Indwelling Lines/Drains at time of discharge:   Lines/Drains/Airways       None                   Time spent on the discharge of patient: 31 minutes         Magails Machado MD  Department of Hospital Medicine  [facility] Tele[facility] ([facility])

## 2024-07-30 ENCOUNTER — INFUSION (OUTPATIENT)
Dept: INFUSION THERAPY | Facility: HOSPITAL | Age: 79
End: 2024-07-30
Attending: NURSE PRACTITIONER
Payer: MEDICARE

## 2024-07-30 VITALS
BODY MASS INDEX: 35.22 KG/M2 | WEIGHT: 224.44 LBS | SYSTOLIC BLOOD PRESSURE: 103 MMHG | TEMPERATURE: 97 F | HEART RATE: 77 BPM | OXYGEN SATURATION: 99 % | RESPIRATION RATE: 18 BRPM | DIASTOLIC BLOOD PRESSURE: 67 MMHG | HEIGHT: 67 IN

## 2024-07-30 DIAGNOSIS — D50.0 IRON DEFICIENCY ANEMIA DUE TO CHRONIC BLOOD LOSS: Primary | ICD-10-CM

## 2024-07-30 PROCEDURE — 63600175 PHARM REV CODE 636 W HCPCS: Mod: JZ,JG | Performed by: NURSE PRACTITIONER

## 2024-07-30 PROCEDURE — 96374 THER/PROPH/DIAG INJ IV PUSH: CPT

## 2024-07-30 PROCEDURE — 96375 TX/PRO/DX INJ NEW DRUG ADDON: CPT

## 2024-07-30 PROCEDURE — 25000003 PHARM REV CODE 250: Performed by: NURSE PRACTITIONER

## 2024-07-30 RX ORDER — METHYLPREDNISOLONE SOD SUCC 125 MG
60 VIAL (EA) INJECTION
Status: COMPLETED | OUTPATIENT
Start: 2024-07-30 | End: 2024-07-30

## 2024-07-30 RX ORDER — DIPHENHYDRAMINE HYDROCHLORIDE 50 MG/ML
50 INJECTION INTRAMUSCULAR; INTRAVENOUS ONCE AS NEEDED
OUTPATIENT
Start: 2024-07-30

## 2024-07-30 RX ORDER — METHYLPREDNISOLONE SOD SUCC 125 MG
60 VIAL (EA) INJECTION
Start: 2024-07-30

## 2024-07-30 RX ORDER — EPINEPHRINE 0.3 MG/.3ML
0.3 INJECTION SUBCUTANEOUS ONCE AS NEEDED
OUTPATIENT
Start: 2024-07-30

## 2024-07-30 RX ADMIN — FERUMOXYTOL 510 MG: 510 INJECTION INTRAVENOUS at 07:07

## 2024-07-30 RX ADMIN — METHYLPREDNISOLONE SODIUM SUCCINATE 60 MG: 125 INJECTION, POWDER, FOR SOLUTION INTRAMUSCULAR; INTRAVENOUS at 07:07

## 2024-07-30 NOTE — NURSING
Infusion# 2    Recent labs? Reviewed    Premeds? Solumedrol 60 mg IVP    S/S of current or recent infections in the past 14 days? None/Denies    Recent Surgery/invasive procedures? Will have a colonoscopy in 2 days.     Any recent vaccines ? None/Denies    Feraheme 510 mg administered IV at a 15 minute rate per orders; see MAR and vitals for more  Details.

## 2024-07-31 ENCOUNTER — HOSPITAL ENCOUNTER (OUTPATIENT)
Facility: HOSPITAL | Age: 79
Discharge: HOME | End: 2024-07-31
Attending: INTERNAL MEDICINE | Admitting: INTERNAL MEDICINE
Payer: MEDICARE

## 2024-07-31 PROCEDURE — 27201489 HC PILLCAM CAPSULE

## 2024-07-31 PROCEDURE — 91110 GI TRC IMG INTRAL ESOPH-ILE: CPT | Mod: TC | Performed by: INTERNAL MEDICINE

## 2024-08-01 ENCOUNTER — OFFICE VISIT (OUTPATIENT)
Dept: SLEEP MEDICINE | Facility: CLINIC | Age: 79
End: 2024-08-01
Payer: MEDICARE

## 2024-08-01 VITALS
SYSTOLIC BLOOD PRESSURE: 120 MMHG | BODY MASS INDEX: 34.57 KG/M2 | RESPIRATION RATE: 15 BRPM | DIASTOLIC BLOOD PRESSURE: 80 MMHG | WEIGHT: 220.25 LBS | HEART RATE: 72 BPM | HEIGHT: 67 IN | OXYGEN SATURATION: 95 %

## 2024-08-01 DIAGNOSIS — R80.9 TYPE 2 DIABETES MELLITUS WITH MICROALBUMINURIA, WITH LONG-TERM CURRENT USE OF INSULIN: Chronic | ICD-10-CM

## 2024-08-01 DIAGNOSIS — J45.20 MILD INTERMITTENT ASTHMA WITHOUT COMPLICATION: Primary | ICD-10-CM

## 2024-08-01 DIAGNOSIS — E78.2 MIXED HYPERLIPIDEMIA: ICD-10-CM

## 2024-08-01 DIAGNOSIS — E03.9 ACQUIRED HYPOTHYROIDISM: Chronic | ICD-10-CM

## 2024-08-01 DIAGNOSIS — Z79.4 TYPE 2 DIABETES MELLITUS WITH MICROALBUMINURIA, WITH LONG-TERM CURRENT USE OF INSULIN: Chronic | ICD-10-CM

## 2024-08-01 DIAGNOSIS — E11.29 TYPE 2 DIABETES MELLITUS WITH MICROALBUMINURIA, WITH LONG-TERM CURRENT USE OF INSULIN: Chronic | ICD-10-CM

## 2024-08-01 DIAGNOSIS — I10 ESSENTIAL HYPERTENSION: Chronic | ICD-10-CM

## 2024-08-01 DIAGNOSIS — G47.33 OSA ON CPAP: Chronic | ICD-10-CM

## 2024-08-01 DIAGNOSIS — I48.0 PAROXYSMAL ATRIAL FIBRILLATION: ICD-10-CM

## 2024-08-01 LAB
FINAL PATHOLOGIC DIAGNOSIS: NORMAL
GROSS: NORMAL
Lab: NORMAL

## 2024-08-01 PROCEDURE — 1101F PT FALLS ASSESS-DOCD LE1/YR: CPT | Mod: CPTII,S$GLB,, | Performed by: INTERNAL MEDICINE

## 2024-08-01 PROCEDURE — 3288F FALL RISK ASSESSMENT DOCD: CPT | Mod: CPTII,S$GLB,, | Performed by: INTERNAL MEDICINE

## 2024-08-01 PROCEDURE — 1159F MED LIST DOCD IN RCRD: CPT | Mod: CPTII,S$GLB,, | Performed by: INTERNAL MEDICINE

## 2024-08-01 PROCEDURE — 1160F RVW MEDS BY RX/DR IN RCRD: CPT | Mod: CPTII,S$GLB,, | Performed by: INTERNAL MEDICINE

## 2024-08-01 PROCEDURE — 99214 OFFICE O/P EST MOD 30 MIN: CPT | Mod: S$GLB,,, | Performed by: INTERNAL MEDICINE

## 2024-08-01 PROCEDURE — 3079F DIAST BP 80-89 MM HG: CPT | Mod: CPTII,S$GLB,, | Performed by: INTERNAL MEDICINE

## 2024-08-01 PROCEDURE — 3074F SYST BP LT 130 MM HG: CPT | Mod: CPTII,S$GLB,, | Performed by: INTERNAL MEDICINE

## 2024-08-01 PROCEDURE — 1111F DSCHRG MED/CURRENT MED MERGE: CPT | Mod: CPTII,S$GLB,, | Performed by: INTERNAL MEDICINE

## 2024-08-01 PROCEDURE — 3072F LOW RISK FOR RETINOPATHY: CPT | Mod: CPTII,S$GLB,, | Performed by: INTERNAL MEDICINE

## 2024-08-01 PROCEDURE — 99999 PR PBB SHADOW E&M-EST. PATIENT-LVL III: CPT | Mod: PBBFAC,,, | Performed by: INTERNAL MEDICINE

## 2024-08-01 PROCEDURE — 1157F ADVNC CARE PLAN IN RCRD: CPT | Mod: CPTII,S$GLB,, | Performed by: INTERNAL MEDICINE

## 2024-08-01 NOTE — ASSESSMENT & PLAN NOTE
8/1/2024     2:55 PM   EPWORTH SLEEPINESS SCALE   Sitting and reading 3   Watching TV 3   Sitting, inactive in a public place (e.g. a theatre or a meeting) 0   As a passenger in a car for an hour without a break 0   Lying down to rest in the afternoon when circumstances permit 1   Sitting and talking to someone 0   Sitting quietly after a lunch without alcohol 2   In a car, while stopped for a few minutes in traffic 0   Total score 9        Data 07/02/2024 to 07/31/2024    USAGE > 4 hrs was 83%    CPAP 11 cm    AHI 2.9    USING AND BENEFITS

## 2024-08-01 NOTE — PROGRESS NOTES
Subjective:       Patient ID: Erendira Pretty is a 79 y.o. male.         Immunization History   Administered Date(s) Administered    COVID-19, MRNA, LN-S, PF (Pfizer) (Purple Cap) 2021, 2021, 2021    COVID-19, mRNA, LNP-S, PF (Moderna )Ages 12+ 2023    Hepatitis A 2003, 2004    Hepatitis A, Pediatric/Adolescent, 2 Dose 2003, 2004    Hepatitis B 2003, 2003, 2004, 2014, 2014    Hepatitis B, Adult 2014, 2014, 10/20/2014    Hepatitis B, Pediatric/Adolescent 2003, 2003, 2004    Influenza 2004, 10/17/2007, 10/20/2008, 10/07/2009, 10/20/2010    Influenza (FLUAD) - Trivalent - Adjuvanted - PF (65+) 2018, 2019    Influenza - High Dose - PF (65 years and older) 10/03/2011, 10/29/2013, 10/21/2014, 2015, 11/10/2016, 10/18/2017, 2018, 10/01/2020, 2021    Influenza - Quadrivalent - PF *Preferred* (6 months and older) 2004    Influenza - Trivalent (ADULT) 2004, 10/17/2007, 10/20/2008, 10/07/2009, 10/20/2010, 2012    Influenza A (H1N1) 2009 Monovalent - IM - PF 2009    Influenza Split 2012    Pneumococcal Conjugate - 13 Valent 2015    Pneumococcal Polysaccharide - 23 Valent 2014    Tdap 2014    Zoster 2012    Zoster Recombinant 2018, 2019, 2019     Social History     Tobacco Use   Smoking Status Former    Current packs/day: 0.00    Average packs/day: 0.5 packs/day for 4.0 years (2.0 ttl pk-yrs)    Types: Cigarettes    Start date: 1968    Quit date: 1972    Years since quittin.1   Smokeless Tobacco Former    Types: Chew    Quit date: 1976      Asthma Control Test  In the past 4  weeks, how much of the time did your asthma keep you from getting as much done at work, school or at home?: None of the time  During the past 4 weeks, how often have you had shortness of breath?: Once or twice a  week  During the past 4 weeks, how often did your asthma symptoms (wheezing, couging, shortness of breath, chest tightness or pain) wake you up at night or earlier than usual in the morning?: Not at all  During the past 4 weeks, how often have you used your rescue inhaler or nebulizer medication (such as albuterol)?: Not at all  How would you rate your asthma control during the past 4 weeks?: Well controlled  If your score is 19 or less, your asthma may not be under control: 23         8/1/2024     2:55 PM   EPWORTH SLEEPINESS SCALE   Sitting and reading 3   Watching TV 3   Sitting, inactive in a public place (e.g. a theatre or a meeting) 0   As a passenger in a car for an hour without a break 0   Lying down to rest in the afternoon when circumstances permit 1   Sitting and talking to someone 0   Sitting quietly after a lunch without alcohol 2   In a car, while stopped for a few minutes in traffic 0   Total score 9            MMRC Dyspnea Scale (4 is worst)     [x] MMRC 0: Dyspneic on strenuous excercise (0 points)    [] MMRC 1: Dyspneic on walking a slight hill (0 points)    [] MMRC 2: Dyspneic on walking level ground; must stop occasionally due to breathlessness (1 point)    [] MMRC 3: Must stop for breathlessness after walking 100 yards or after a few minutes (2 points)    [] MMRC 4: Cannot leave house; breathless on dressing/undressing (3 points)        Chief Complaint: Asthma    Mr Maria De Jesus Krishna is 76y.o.   DELONTE on CPAP, Chr wheezy asthmatic bronchitis, Chr nasal congestion  No interval exacerbations  immunizations current  Has no SOB, no cough, congestion  Lingering issue with leg muscle aches: vascular studies normal  LV 04/28/2021  Has new CPAP machine: Dream station 2  Here to review results  1. CPAP Download: CPAP 11 cm, Usage > 4 hrs was 100%  2. Spirometry was Normal  3. CXR Normal  Average AHI 2.5  Asthma score 25     Using nebuliser  Currently on SYMBICORT: refills sent.  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  CPAp 11 cm  Usage > 4 hrs was 3.8  Usage > 4 hrs was 92.2%  CXR reviewed  [place] 8      08/21/2023  Followup   Recent cough, congestion, green sputum  Recent burning  from neibhour  CPAP data reviewed  CPAP 11 cmwp  No fever  On SYMBICORT and Albuterol   Seen ENT recently   Occasional afternoon hoarseness  Rinses mouth     11/10/2023  Follow up  Last visit 08/21/2023  Cough, sinus fullness, drainage  Green  sometimes bloody discharge  No fever  Similar illness last year    FeNo 28  Adherent with CPAP: missed 4 days but adherent  Spirometry Normal    05/10/2024   Followup  Last visit 11/10/2023  No recent exacebations  Recent hospital admissions: GIB  A fib on Xarelto  No cough, No wheezing, No SOB  Asks for new CPAP  Current heater not working      08/01/2024  Followup  Recent hosptalization  Reviewed Download  CPAP 11 cmWP  Asthma stable  No Cough, No SOB        Asthma  There is no cough, shortness of breath, sputum production or wheezing. Pertinent negatives include no headaches, orthopnea or postnasal drip. His past medical history is significant for asthma.        [facility] [facility] ([facility])  Hospital Medicine  Discharge Summary        Patient Name: Erendira Pretty  MRN: [id number]  LLOYD: [id number]  Patient Class: IP- Inpatient  Admission Date: 7/24/2024  Hospital Length of Stay: 2 days  Discharge Date and Time: 7/27/2024  4:12 PM  Attending Physician: No att. providers found   Discharging Provider: Magalis Machado MD  Primary Care Provider: Bhupinder Callaway MD     Primary Care Team: Networked reference to record PCT      HPI:   Erendira Pretty is a 79 y.o. male with a PMH  has a past medical history of Anticoagulant long-term use, Aortic stenosis, Asthma, Benign prostatic hyperplasia with nocturia, Bilateral carotid artery stenosis (07/21/2021), BPH  (benign prostatic hyperplasia), CAD (coronary artery disease), Cirrhosis, Claudication (04/09/2014), Colon polyp, COPD (chronic obstructive pulmonary disease), ED (erectile dysfunction), Encounter for blood transfusion, Ex-smoker, Hearing loss NEC, Hepatitis C, Hyperlipidemia, Hypertension, Hypothyroid, Open angle with borderline findings and low glaucoma risk in both eyes (09/22/2020), DELONTE on CPAP, Osteoarthritis, Paroxysmal atrial fibrillation, PVD (peripheral vascular disease), Secondary esophageal varices without bleeding (06/26/2017), and Type 2 diabetes mellitus.  Presented to the ER for persistent shortness of breath that worsened today which is exacerbated by exertion.  Patient reports he has been having symptoms for the last 6 months.  Patient was hospitalized numerous times for symptomatic anemia requiring blood transfusions and iron infusions.  Patient had pill camera, EGD, and colonoscopy performed without any identifiable source of active bleed.  Patient was taken aspirin, Plavix, and Xarelto, but has since been taken off of Plavix in his only currently taking aspirin and Xarelto.  Patient had an iron infusion performed on 07/23/2024.  Patient was evaluated by his cardiologist (Dr. Gallardo) on 07/17/2024.  Patient is scheduled to have Watchman procedure performed in the next couple of weeks.  Patient states he has also had associated black colored stools, but this has been ongoing as well.  Denies any passage of blood clots or bright red/dark red blood that feels toilet bowl.  Denies any use of home oxygen.  Denies any actual chest pain or palpitations.  Denies lightheadedness/dizziness/cough, cold, congestion, recent illnesses, sick contacts, or any other symptoms at this time.     ER workup revealed H/H of 7.5/24.7 (previously 9.0/29.4 on 07/16/2024), BUN/creatinine 28/1.5 (previously 13/1.2 on 07/16/2024),  mg/dL, , troponin negative, occult blood stool positive, chest x-ray negative  for acute findings, EKG revealed sinus rhythm with first-degree AV block with PACs with a ventricular rate of 72 beats per minute and a QT/QTC of 378/413.  Patient received 40 mg Protonix IV and has 1 unit of PRBCs ordered by ER provider.  Hospital Medicine consulted to admit patient for symptomatic anemia in setting of GI bleed and LESTER.  Patient in agreement with treatment plan.  Patient will be admitted under inpatient status.     PCP: Bhupinder Callaway        Procedure(s) (LRB):  EGD (ESOPHAGOGASTRODUODENOSCOPY) (N/A)  COLONOSCOPY (N/A)       Hospital Course:   The patient presented with shortness of breath and melena.He was noted to have hemoglobin of 7.5 which had decreased from 9. He was transfused a unit of pRBCs. He had appropriate response. GI was consulted and planned EGD and colonoscopy 48 hours for dose of Xarelto. He tolerated colonscopy prep. His hemoglobin remained stable. He underwent EGD and colonoscopy. Hold Xarelto until  8/4/24. Continue PPI patient recently had prescription renewed and has a year worth of refills. Outpatient GI follow-up and probable capsule. Patient tolerated oral intake prior to discharge. Patient seen and examined, stable for discharge.      Goals of Care Treatment Preferences:  Code Status: Full Code     Living Will: Yes                      Review of Systems   Constitutional:  Negative for fatigue.   HENT:  Negative for postnasal drip, sinus pressure, voice change and congestion.    Eyes: Negative.    Respiratory:  Negative for snoring, cough, sputum production, shortness of breath, wheezing, orthopnea, asthma nighttime symptoms, pleurisy, dyspnea on extertion, use of rescue inhaler, somnolence and Paroxysmal Nocturnal Dyspnea.    Cardiovascular:  Negative for leg swelling.   Genitourinary: Negative.    Endocrine: endocrine negative    Musculoskeletal: Negative.    Skin: Negative.    Gastrointestinal: Negative.    Neurological:  Negative for headaches.  "  Psychiatric/Behavioral: Negative.     All other systems reviewed and are negative.      Objective:       Vitals:    24 1453   BP: 120/80   Pulse: 72   Resp: 15   SpO2: 95%   Weight: 99.9 kg (220 lb 3.8 oz)   Height: 5' 7" (1.702 m)               Physical Exam   Constitutional: He is oriented to person, place, and time. He appears well-developed and well-nourished. He is obese.   HENT:   Head: Normocephalic.   Nose: No mucosal edema.   Mouth/Throat: Oropharynx is clear and moist. Mallampati Score: III.   Cardiovascular: Normal rate, regular rhythm and intact distal pulses.   No murmur heard.  Pulmonary/Chest: Normal expansion, symmetric chest wall expansion, effort normal and breath sounds normal. He has no decreased breath sounds. He has no rhonchi. He has no rales.   Abdominal: Soft. Bowel sounds are normal.   Musculoskeletal:         General: No edema. Normal range of motion.      Cervical back: Normal range of motion and neck supple.   Lymphadenopathy:     He has no cervical adenopathy.   Neurological: He is alert and oriented to person, place, and time. He has normal reflexes.   Skin: Skin is warm and dry.   Psychiatric: He has a normal mood and affect.   Nursing note and vitals reviewed.    Personal Diagnostic Review        Personal Diagnostic Review  [x]  . 53 secs.  CPAP SummaryDOWNLOAD           [x]  SPIROMETRY      Erendira Pretty  2024 - 2024  Patient ID: [id number]  : 1945  Age: 79 years  Gender: Male  [facility]  [address] [facility], [address]  Compliance Report  Compliance  Payor Standard  Usage 2024 - 2024  Usage days 25/30 days (83%)  >= 4 hours 25 days (83%)  < 4 hours 0 days (0%)  Usage hours 216 hours 51 minutes  Average usage (total days) 7 hours 14 minutes  Average usage (days used) 8 hours 40 minutes  Median usage (days used) 8 hours        X-Ray Chest AP Portable  EXAM: XR CHEST AP PORTABLE    CLINICAL HISTORY: Chest " "pain    PRIOR:  05/10/2024    FINDINGS:   Hazy opacity left midlung similar prior given technique differences.  No new pulmonary consolidation or pleural effusion    IMPRESSION:  Stable chest exam    Finalized on: 2024 10:42 PM By:  Eliz Mcbride MD  R# [id number]      2024 22:44:26.083    [facility]            2024     2:53 PM 2024     8:19 AM 2024     7:09 AM 2024    12:23 PM 2024     8:32 AM 2024     8:18 AM 2024     8:10 AM   Pulmonary Function Tests   SpO2 95 % 99 % 99 % 97 % 99 % 100 % 100 %   Height 5' 7" (1.702 m)  5' 7" (1.702 m)       Weight 99.9 kg (220 lb 3.8 oz)  101.8 kg (224 lb 6.9 oz)       BMI (Calculated) 34.5  35.1             Assessment:       Problem List Items Addressed This Visit       Essential hypertension (Chronic)    Acquired hypothyroidism (Chronic)    Type 2 diabetes mellitus with microalbuminuria, with long-term current use of insulin (Chronic)    DELONTE on CPAP (Chronic)         2024     2:55 PM   EPWORTH SLEEPINESS SCALE   Sitting and reading 3   Watching TV 3   Sitting, inactive in a public place (e.g. a theatre or a meeting) 0   As a passenger in a car for an hour without a break 0   Lying down to rest in the afternoon when circumstances permit 1   Sitting and talking to someone 0   Sitting quietly after a lunch without alcohol 2   In a car, while stopped for a few minutes in traffic 0   Total score 9        Data 2024 to 2024    USAGE > 4 hrs was 83%    CPAP 11 cm    AHI 2.9    USING AND BENEFITS         BMI 36.0-36.9,adult    Mild intermittent asthma without complication - Primary    Relevant Orders    Fraction of  Nitric Oxide    Spirometry without Bronchodilator    X-Ray Chest PA And Lateral    Mixed hyperlipidemia    Paroxysmal atrial fibrillation        Plan:          Requested Prescriptions      No prescriptions requested or ordered in this encounter     Orders Placed This Encounter   Procedures    X-Ray Chest PA And " Lateral     Standing Status:   Future     Standing Expiration Date:   2025    Fraction of  Nitric Oxide     Standing Status:   Future     Standing Expiration Date:   2025     Order Specific Question:   Release to patient     Answer:   Immediate    Spirometry without Bronchodilator     Standing Status:   Future     Standing Expiration Date:   2025     Order Specific Question:   Release to patient     Answer:   Immediate     Has done well without any overall respiratory issues other than his GI issues        Follow up in about 1 year (around 2025), or download, FeNo, terry, CXR.    This note was prepared using voice recognition system and is likely to have sound alike errors that may have been overlooked even after proof reading.  Please call me with any questions    Discussed diagnosis, its evaluation, treatment and usual course. All questions answered.    Thank you for the courtesy of participating in the care of this patient    Rishi Molina MD

## 2024-08-02 ENCOUNTER — PATIENT MESSAGE (OUTPATIENT)
Dept: GASTROENTEROLOGY | Facility: CLINIC | Age: 79
End: 2024-08-02

## 2024-08-02 PROCEDURE — 91110 GI TRC IMG INTRAL ESOPH-ILE: CPT | Mod: 26,52,, | Performed by: INTERNAL MEDICINE

## 2024-08-02 NOTE — PROGRESS NOTES
The polyps removed were precancerous also known as adenomas. They were completely removed. Guidelines DO NOT recommend a repeat colonoscopy due to your age in 5 years. Ideally, colonoscopies are not performed over the age of 80 unless a patient is bleeding. We are happy to see you in the GI clinic to discuss this further. We'll be in touch with the video capsule endoscopy results once they become available.    Thanks for trusting us with your healthcare needs and using [facility].     Sincerely,    Ama Barrera M.D.

## 2024-08-05 ENCOUNTER — PATIENT MESSAGE (OUTPATIENT)
Dept: GASTROENTEROLOGY | Facility: HOSPITAL | Age: 79
End: 2024-08-05
Payer: MEDICARE

## 2024-08-05 ENCOUNTER — PATIENT MESSAGE (OUTPATIENT)
Dept: GASTROENTEROLOGY | Facility: CLINIC | Age: 79
End: 2024-08-05
Payer: MEDICARE

## 2024-08-05 NOTE — LETTER
August 12, 2024    Erendira Pretty  [address] [place] [address]             [facility]  [address] [place] [address] [address] EVELIO DEE [address]  Phone: [phone]  Fax: [phone] Dear Mr. Pretty:      We have been unsuccessful in reaching you to review your result. If you have any questions or concerns, we ask you to contact our office at [phone] between the office hours of 8:00 am and 4:00 pm.     The study did not reveal lesions or bleeding. Unfortunately the prep did not clean the small bowel well.     Hope you are doing OK.     If you have any questions or concerns, please don't hesitate to call.    Sincerely,        Nikhil Watson MD/felicity

## 2024-08-08 ENCOUNTER — HOSPITAL ENCOUNTER (OUTPATIENT)
Dept: RADIOLOGY | Facility: HOSPITAL | Age: 79
Discharge: HOME | End: 2024-08-08
Attending: PEDIATRICS
Payer: MEDICARE

## 2024-08-08 ENCOUNTER — OFFICE VISIT (OUTPATIENT)
Dept: INTERNAL MEDICINE | Facility: CLINIC | Age: 79
End: 2024-08-08
Payer: MEDICARE

## 2024-08-08 VITALS
SYSTOLIC BLOOD PRESSURE: 122 MMHG | WEIGHT: 224.63 LBS | BODY MASS INDEX: 35.26 KG/M2 | RESPIRATION RATE: 18 BRPM | HEIGHT: 67 IN | HEART RATE: 62 BPM | TEMPERATURE: 97 F | DIASTOLIC BLOOD PRESSURE: 80 MMHG | OXYGEN SATURATION: 99 %

## 2024-08-08 DIAGNOSIS — Z01.89: ICD-10-CM

## 2024-08-08 DIAGNOSIS — T45.515S: ICD-10-CM

## 2024-08-08 DIAGNOSIS — I25.118 CORONARY ARTERY DISEASE WITH EXERTIONAL ANGINA: ICD-10-CM

## 2024-08-08 DIAGNOSIS — D50.0 IRON DEFICIENCY ANEMIA DUE TO CHRONIC BLOOD LOSS: Primary | ICD-10-CM

## 2024-08-08 DIAGNOSIS — I48.0 PAROXYSMAL ATRIAL FIBRILLATION: ICD-10-CM

## 2024-08-08 DIAGNOSIS — K21.00 GASTROESOPHAGEAL REFLUX DISEASE WITH ESOPHAGITIS WITHOUT HEMORRHAGE: ICD-10-CM

## 2024-08-08 DIAGNOSIS — K55.21 AVM (ARTERIOVENOUS MALFORMATION) OF SMALL BOWEL, ACQUIRED WITH HEMORRHAGE: ICD-10-CM

## 2024-08-08 PROCEDURE — 74018 RADEX ABDOMEN 1 VIEW: CPT | Mod: TC

## 2024-08-08 PROCEDURE — 99999 PR PBB SHADOW E&M-EST. PATIENT-LVL III: CPT | Mod: PBBFAC,,, | Performed by: PEDIATRICS

## 2024-08-08 PROCEDURE — 74018 RADEX ABDOMEN 1 VIEW: CPT | Mod: 26,,, | Performed by: RADIOLOGY

## 2024-08-12 DIAGNOSIS — K74.60 UNSPECIFIED CIRRHOSIS OF LIVER: ICD-10-CM

## 2024-08-12 RX ORDER — FAMOTIDINE 40 MG/1
40 TABLET, FILM COATED ORAL NIGHTLY
Qty: 30 TABLET | Refills: 11 | OUTPATIENT
Start: 2024-08-12

## 2024-08-15 ENCOUNTER — LAB VISIT (OUTPATIENT)
Dept: LAB | Facility: HOSPITAL | Age: 79
End: 2024-08-15
Attending: PEDIATRICS
Payer: MEDICARE

## 2024-08-15 ENCOUNTER — TELEPHONE (OUTPATIENT)
Dept: CARDIOLOGY | Facility: CLINIC | Age: 79
End: 2024-08-15
Payer: MEDICARE

## 2024-08-15 DIAGNOSIS — D50.0 IRON DEFICIENCY ANEMIA DUE TO CHRONIC BLOOD LOSS: ICD-10-CM

## 2024-08-15 LAB
BASOPHILS # BLD AUTO: 0.04 K/UL (ref 0–0.2)
BASOPHILS NFR BLD: 1.1 % (ref 0–1.9)
DIFFERENTIAL METHOD BLD: ABNORMAL
EOSINOPHIL # BLD AUTO: 0.1 K/UL (ref 0–0.5)
EOSINOPHIL NFR BLD: 3.7 % (ref 0–8)
ERYTHROCYTE [DISTWIDTH] IN BLOOD BY AUTOMATED COUNT: 20.5 % (ref 11.5–14.5)
HCT VFR BLD AUTO: 36 % (ref 40–54)
HGB BLD-MCNC: 11.2 G/DL (ref 14–18)
IMM GRANULOCYTES # BLD AUTO: 0.01 K/UL (ref 0–0.04)
IMM GRANULOCYTES NFR BLD AUTO: 0.3 % (ref 0–0.5)
LYMPHOCYTES # BLD AUTO: 0.9 K/UL (ref 1–4.8)
LYMPHOCYTES NFR BLD: 22.6 % (ref 18–48)
MCH RBC QN AUTO: 29.9 PG (ref 27–31)
MCHC RBC AUTO-ENTMCNC: 31.1 G/DL (ref 32–36)
MCV RBC AUTO: 96 FL (ref 82–98)
MONOCYTES # BLD AUTO: 0.5 K/UL (ref 0.3–1)
MONOCYTES NFR BLD: 13.6 % (ref 4–15)
NEUTROPHILS # BLD AUTO: 2.2 K/UL (ref 1.8–7.7)
NEUTROPHILS NFR BLD: 58.7 % (ref 38–73)
NRBC BLD-RTO: 0 /100 WBC
PLATELET # BLD AUTO: 165 K/UL (ref 150–450)
PMV BLD AUTO: 10.6 FL (ref 9.2–12.9)
RBC # BLD AUTO: 3.74 M/UL (ref 4.6–6.2)
WBC # BLD AUTO: 3.76 K/UL (ref 3.9–12.7)

## 2024-08-15 PROCEDURE — 85025 COMPLETE CBC W/AUTO DIFF WBC: CPT | Performed by: PEDIATRICS

## 2024-08-15 PROCEDURE — 36415 COLL VENOUS BLD VENIPUNCTURE: CPT | Mod: PO | Performed by: PEDIATRICS

## 2024-08-15 NOTE — TELEPHONE ENCOUNTER
----- Message from Jey Durant PharmD sent at 8/15/2024  1:31 PM CDT -----  Regarding: Leqvio  Good afternoon,    Mr. Pretty has let us know that provider is holding Leqvio start due to planned procedures and patient wasn't sure when he will be cleared since he stated there are complications to his planned procedures. We will close his enrollment at this time and will re-enroll him once we are notified that patient is ready to start treatment. Patient is aware as well. Please let [facility] know if you have any questions or there is any way we can further assist.     Thank you,  Jey Durant, PharmD  Clinical Pharmacist  [facility]  Phone: [phone]  Fax: [phone]

## 2024-08-20 ENCOUNTER — TELEPHONE (OUTPATIENT)
Dept: CARDIOLOGY | Facility: CLINIC | Age: 79
End: 2024-08-20
Payer: MEDICARE

## 2024-08-20 DIAGNOSIS — Z79.4 TYPE 2 DIABETES MELLITUS WITH CHRONIC KIDNEY DISEASE, WITH LONG-TERM CURRENT USE OF INSULIN, UNSPECIFIED CKD STAGE: Chronic | ICD-10-CM

## 2024-08-20 DIAGNOSIS — E11.22 TYPE 2 DIABETES MELLITUS WITH CHRONIC KIDNEY DISEASE, WITH LONG-TERM CURRENT USE OF INSULIN, UNSPECIFIED CKD STAGE: Chronic | ICD-10-CM

## 2024-08-20 RX ORDER — CARVEDILOL 12.5 MG/1
25 TABLET ORAL 2 TIMES DAILY WITH MEALS
Qty: 360 TABLET | Refills: 3 | Status: SHIPPED | OUTPATIENT
Start: 2024-08-20 | End: 2025-08-20

## 2024-08-20 NOTE — TELEPHONE ENCOUNTER
No care due was identified.  [facility] Embedded Care Due Messages. Reference number: [id number].   8/20/2024 11:28:45 AM CDT

## 2024-08-20 NOTE — TELEPHONE ENCOUNTER
This medication or product was previously approved on A-24G10_1 from 2024-01-01 to 2024-12-31. **Please note: This request was submitted electronically. Formulary lowering, tiering exception, cost reduction and/or pre-benefit determination review (including prospective Medicare hospice reviews) requests cannot be requested using this method of submission. Providers contact us at [phone] for further assistance.

## 2024-08-20 NOTE — TELEPHONE ENCOUNTER
----- Message from Lainey Washington sent at 8/20/2024 10:25 AM CDT -----  Contact: Erendira  .Type:  RX Refill Request    Who Called:  Erendira   Refill or New Rx: refill   RX Name and Strength:carvediloL (COREG) 12.5 MG tablet   How is the patient currently taking it? (ex. 1XDay): As prescribed   Is this a 30 day or 90 day RX: 90   Preferred Pharmacy with phone number: .  [facility] [address] [facility] [address]  [address] [address] [address]  Phone: [phone] Fax: [phone]  Local or Mail Order: local   Ordering Provider: CHONG gilliam / Sami   Would the patient rather a call back or a response via [facility]?  Call   Best Call Back Number: .[phone]  Additional Information:

## 2024-08-20 NOTE — TELEPHONE ENCOUNTER
----- Message from Lainey Washington sent at 8/20/2024 10:23 AM CDT -----  Contact: Tessa  .Type:  RX Refill Request    Who Called:  Tessa   Refill or New Rx: refill   RX Name and Strength:\JARDIANCE 25 mg tablet   How is the patient currently taking it? (ex. 1XDay): As prescribed   Is this a 30 day or 90 day RX: 90   Preferred Pharmacy with phone number: .  [facility] [address] [facility] [address]  [address] [address] [address]  Phone: [phone] Fax: [phone]  Local or Mail Order: local   Ordering Provider: Dr corey    Would the patient rather a call back or a response via [facility]?  Call   Best Call Back Number: .[phone]  Additional Information:

## 2024-08-20 NOTE — TELEPHONE ENCOUNTER
----- Message from Lainey Washington sent at 8/20/2024 10:23 AM CDT -----  Contact: Tessa  .Type:  RX Refill Request    Who Called:  Tessa   Refill or New Rx: refill   RX Name and Strength:\JARDIANCE 25 mg tablet   How is the patient currently taking it? (ex. 1XDay): As prescribed   Is this a 30 day or 90 day RX: 90   Preferred Pharmacy with phone number: .  [facility] [address] [facility] [address]  [address] [address] [address]  Phone: [phone] Fax: [phone]  Local or Mail Order: local   Ordering Provider: Dr corey    Would the patient rather a call back or a response via [facility]?  Call   Best Call Back Number: .[phone]  Additional Information:

## 2024-08-26 ENCOUNTER — HOSPITAL ENCOUNTER (OUTPATIENT)
Facility: HOSPITAL | Age: 79
Discharge: HOME | End: 2024-08-27
Attending: EMERGENCY MEDICINE | Admitting: FAMILY MEDICINE
Payer: MEDICARE

## 2024-08-26 DIAGNOSIS — R07.9 CHEST PAIN: ICD-10-CM

## 2024-08-26 DIAGNOSIS — I25.10 CAD, MULTIPLE VESSEL: ICD-10-CM

## 2024-08-26 DIAGNOSIS — K21.9 GASTROESOPHAGEAL REFLUX DISEASE, UNSPECIFIED WHETHER ESOPHAGITIS PRESENT: ICD-10-CM

## 2024-08-26 DIAGNOSIS — I20.89 EXERTIONAL ANGINA: ICD-10-CM

## 2024-08-26 DIAGNOSIS — R10.13 EPIGASTRIC PAIN: Primary | ICD-10-CM

## 2024-08-26 DIAGNOSIS — I20.0 UNSTABLE ANGINA PECTORIS: ICD-10-CM

## 2024-08-26 DIAGNOSIS — Z95.1 S/P CABG (CORONARY ARTERY BYPASS GRAFT): ICD-10-CM

## 2024-08-26 PROBLEM — I65.23 BILATERAL CAROTID ARTERY STENOSIS: Chronic | Status: RESOLVED | Noted: 2021-07-21 | Resolved: 2024-08-26

## 2024-08-26 LAB
ALBUMIN SERPL BCP-MCNC: 3.4 G/DL (ref 3.5–5.2)
ALP SERPL-CCNC: 65 U/L (ref 55–135)
ALT SERPL W/O P-5'-P-CCNC: 11 U/L (ref 10–44)
ANION GAP SERPL CALC-SCNC: 10 MMOL/L (ref 8–16)
AORTIC ROOT ANNULUS: 3.41 CM
APTT PPP: 31.9 SEC (ref 21–32)
ASCENDING AORTA: 3.41 CM
AST SERPL-CCNC: 17 U/L (ref 10–40)
AV INDEX (PROSTH): 0.27
AV MEAN GRADIENT: 34 MMHG
AV PEAK GRADIENT: 54 MMHG
AV VALVE AREA BY VELOCITY RATIO: 0.96 CM²
AV VALVE AREA: 0.84 CM²
AV VELOCITY RATIO: 0.31
BASOPHILS # BLD AUTO: 0.02 K/UL (ref 0–0.2)
BASOPHILS # BLD AUTO: 0.02 K/UL (ref 0–0.2)
BASOPHILS NFR BLD: 0.5 % (ref 0–1.9)
BASOPHILS NFR BLD: 0.5 % (ref 0–1.9)
BILIRUB SERPL-MCNC: 0.2 MG/DL (ref 0.1–1)
BNP SERPL-MCNC: 260 PG/ML (ref 0–99)
BSA FOR ECHO PROCEDURE: 2.2 M2
BUN SERPL-MCNC: 22 MG/DL (ref 8–23)
CALCIUM SERPL-MCNC: 8.5 MG/DL (ref 8.7–10.5)
CHLORIDE SERPL-SCNC: 106 MMOL/L (ref 95–110)
CO2 SERPL-SCNC: 25 MMOL/L (ref 23–29)
CREAT SERPL-MCNC: 1.1 MG/DL (ref 0.5–1.4)
CV ECHO LV RWT: 0.64 CM
DIFFERENTIAL METHOD BLD: ABNORMAL
DIFFERENTIAL METHOD BLD: ABNORMAL
DOP CALC AO PEAK VEL: 3.67 M/S
DOP CALC AO VTI: 92.5 CM
DOP CALC LVOT AREA: 3.1 CM2
DOP CALC LVOT DIAMETER: 2 CM
DOP CALC LVOT PEAK VEL: 1.12 M/S
DOP CALC LVOT STROKE VOLUME: 77.24 CM3
DOP CALC MV VTI: 40.8 CM
DOP CALC RVOT PEAK VEL: 0.88 M/S
DOP CALC RVOT VTI: 16.5 CM
DOP CALCLVOT PEAK VEL VTI: 24.6 CM
E WAVE DECELERATION TIME: 358.21 MSEC
E/A RATIO: 1.23
E/E' RATIO: 13.44 M/S
ECHO LV POSTERIOR WALL: 1.31 CM (ref 0.6–1.1)
EOSINOPHIL # BLD AUTO: 0.1 K/UL (ref 0–0.5)
EOSINOPHIL # BLD AUTO: 0.1 K/UL (ref 0–0.5)
EOSINOPHIL NFR BLD: 2.1 % (ref 0–8)
EOSINOPHIL NFR BLD: 2.3 % (ref 0–8)
ERYTHROCYTE [DISTWIDTH] IN BLOOD BY AUTOMATED COUNT: 17.7 % (ref 11.5–14.5)
ERYTHROCYTE [DISTWIDTH] IN BLOOD BY AUTOMATED COUNT: 17.8 % (ref 11.5–14.5)
EST. GFR  (NO RACE VARIABLE): >60 ML/MIN/1.73 M^2
FRACTIONAL SHORTENING: 27 % (ref 28–44)
GLUCOSE SERPL-MCNC: 125 MG/DL (ref 70–110)
HCT VFR BLD AUTO: 31.6 % (ref 40–54)
HCT VFR BLD AUTO: 33.6 % (ref 40–54)
HGB BLD-MCNC: 10.4 G/DL (ref 14–18)
HGB BLD-MCNC: 10.9 G/DL (ref 14–18)
IMM GRANULOCYTES # BLD AUTO: 0.01 K/UL (ref 0–0.04)
IMM GRANULOCYTES # BLD AUTO: 0.02 K/UL (ref 0–0.04)
IMM GRANULOCYTES NFR BLD AUTO: 0.3 % (ref 0–0.5)
IMM GRANULOCYTES NFR BLD AUTO: 0.5 % (ref 0–0.5)
INR PPP: 1 (ref 0.8–1.2)
INTERVENTRICULAR SEPTUM: 1.58 CM (ref 0.6–1.1)
IVC DIAMETER: 2.05 CM
IVRT: 51.38 MSEC
LA MAJOR: 5.67 CM
LA MINOR: 5.85 CM
LA WIDTH: 4.4 CM
LEFT ATRIUM SIZE: 4.84 CM
LEFT ATRIUM VOLUME INDEX: 48.9 ML/M2
LEFT ATRIUM VOLUME: 104.24 CM3
LEFT INTERNAL DIMENSION IN SYSTOLE: 3.01 CM (ref 2.1–4)
LEFT VENTRICLE DIASTOLIC VOLUME INDEX: 34.76 ML/M2
LEFT VENTRICLE DIASTOLIC VOLUME: 74.04 ML
LEFT VENTRICLE MASS INDEX: 107 G/M2
LEFT VENTRICLE SYSTOLIC VOLUME INDEX: 16.5 ML/M2
LEFT VENTRICLE SYSTOLIC VOLUME: 35.25 ML
LEFT VENTRICULAR INTERNAL DIMENSION IN DIASTOLE: 4.1 CM (ref 3.5–6)
LEFT VENTRICULAR MASS: 227.41 G
LV LATERAL E/E' RATIO: 10.08 M/S
LV SEPTAL E/E' RATIO: 20.17 M/S
LVED V (TEICH): 74.04 ML
LVES V (TEICH): 35.25 ML
LVOT MG: 3.3 MMHG
LVOT MV: 0.89 CM/S
LYMPHOCYTES # BLD AUTO: 0.9 K/UL (ref 1–4.8)
LYMPHOCYTES # BLD AUTO: 0.9 K/UL (ref 1–4.8)
LYMPHOCYTES NFR BLD: 23.3 % (ref 18–48)
LYMPHOCYTES NFR BLD: 24 % (ref 18–48)
MCH RBC QN AUTO: 29.9 PG (ref 27–31)
MCH RBC QN AUTO: 30.3 PG (ref 27–31)
MCHC RBC AUTO-ENTMCNC: 32.4 G/DL (ref 32–36)
MCHC RBC AUTO-ENTMCNC: 32.9 G/DL (ref 32–36)
MCV RBC AUTO: 92 FL (ref 82–98)
MCV RBC AUTO: 92 FL (ref 82–98)
MONOCYTES # BLD AUTO: 0.5 K/UL (ref 0.3–1)
MONOCYTES # BLD AUTO: 0.5 K/UL (ref 0.3–1)
MONOCYTES NFR BLD: 11.5 % (ref 4–15)
MONOCYTES NFR BLD: 12.6 % (ref 4–15)
MV MEAN GRADIENT: 3 MMHG
MV PEAK A VEL: 0.98 M/S
MV PEAK E VEL: 1.21 M/S
MV PEAK GRADIENT: 9 MMHG
MV STENOSIS PRESSURE HALF TIME: 103.88 MS
MV VALVE AREA BY CONTINUITY EQUATION: 1.89 CM2
MV VALVE AREA P 1/2 METHOD: 2.12 CM2
NEUTROPHILS # BLD AUTO: 2.3 K/UL (ref 1.8–7.7)
NEUTROPHILS # BLD AUTO: 2.5 K/UL (ref 1.8–7.7)
NEUTROPHILS NFR BLD: 60.3 % (ref 38–73)
NEUTROPHILS NFR BLD: 62.1 % (ref 38–73)
NRBC BLD-RTO: 0 /100 WBC
NRBC BLD-RTO: 0 /100 WBC
OHS CV RV/LV RATIO: 0.68 CM
PISA MRMAX VEL: 4.58 M/S
PISA TR MAX VEL: 2.68 M/S
PLATELET # BLD AUTO: 136 K/UL (ref 150–450)
PLATELET # BLD AUTO: 151 K/UL (ref 150–450)
PMV BLD AUTO: 10 FL (ref 9.2–12.9)
PMV BLD AUTO: 9.7 FL (ref 9.2–12.9)
POCT GLUCOSE: 190 MG/DL (ref 70–110)
POTASSIUM SERPL-SCNC: 3.8 MMOL/L (ref 3.5–5.1)
PROT SERPL-MCNC: 6.5 G/DL (ref 6–8.4)
PROTHROMBIN TIME: 12 SEC (ref 9–12.5)
PV MEAN GRADIENT: 2 MMHG
RA MAJOR: 4.73 CM
RA PRESSURE ESTIMATED: 8 MMHG
RA WIDTH: 3.3 CM
RBC # BLD AUTO: 3.43 M/UL (ref 4.6–6.2)
RBC # BLD AUTO: 3.64 M/UL (ref 4.6–6.2)
RIGHT VENTRICULAR END-DIASTOLIC DIMENSION: 2.8 CM
RV TB RVSP: 11 MMHG
SODIUM SERPL-SCNC: 141 MMOL/L (ref 136–145)
STJ: 3.5 CM
TDI LATERAL: 0.12 M/S
TDI SEPTAL: 0.06 M/S
TDI: 0.09 M/S
TR MAX PG: 29 MMHG
TR MEAN GRADIENT: 24 MMHG
TRICUSPID ANNULAR PLANE SYSTOLIC EXCURSION: 1.55 CM
TROPONIN I SERPL DL<=0.01 NG/ML-MCNC: 0.01 NG/ML (ref 0–0.03)
TROPONIN I SERPL DL<=0.01 NG/ML-MCNC: 0.01 NG/ML (ref 0–0.03)
TROPONIN I SERPL DL<=0.01 NG/ML-MCNC: <0.006 NG/ML (ref 0–0.03)
TV REST PULMONARY ARTERY PRESSURE: 37 MMHG
WBC # BLD AUTO: 3.88 K/UL (ref 3.9–12.7)
WBC # BLD AUTO: 4 K/UL (ref 3.9–12.7)
Z-SCORE OF LEFT VENTRICULAR DIMENSION IN END DIASTOLE: -5.04
Z-SCORE OF LEFT VENTRICULAR DIMENSION IN END SYSTOLE: -2.52

## 2024-08-26 PROCEDURE — 63600175 PHARM REV CODE 636 W HCPCS: Performed by: INTERNAL MEDICINE

## 2024-08-26 PROCEDURE — 25000003 PHARM REV CODE 250

## 2024-08-26 PROCEDURE — 80053 COMPREHEN METABOLIC PANEL: CPT | Performed by: EMERGENCY MEDICINE

## 2024-08-26 PROCEDURE — 93005 ELECTROCARDIOGRAM TRACING: CPT

## 2024-08-26 PROCEDURE — G0378 HOSPITAL OBSERVATION PER HR: HCPCS

## 2024-08-26 PROCEDURE — 63600175 PHARM REV CODE 636 W HCPCS: Performed by: EMERGENCY MEDICINE

## 2024-08-26 PROCEDURE — 63600175 PHARM REV CODE 636 W HCPCS

## 2024-08-26 PROCEDURE — 85610 PROTHROMBIN TIME: CPT

## 2024-08-26 PROCEDURE — 99900035 HC TECH TIME PER 15 MIN (STAT)

## 2024-08-26 PROCEDURE — 85025 COMPLETE CBC W/AUTO DIFF WBC: CPT | Performed by: EMERGENCY MEDICINE

## 2024-08-26 PROCEDURE — 84484 ASSAY OF TROPONIN QUANT: CPT | Performed by: EMERGENCY MEDICINE

## 2024-08-26 PROCEDURE — 83880 ASSAY OF NATRIURETIC PEPTIDE: CPT | Performed by: EMERGENCY MEDICINE

## 2024-08-26 PROCEDURE — 85025 COMPLETE CBC W/AUTO DIFF WBC: CPT | Mod: 91

## 2024-08-26 PROCEDURE — 85730 THROMBOPLASTIN TIME PARTIAL: CPT

## 2024-08-26 PROCEDURE — 93010 ELECTROCARDIOGRAM REPORT: CPT | Mod: ,,, | Performed by: INTERNAL MEDICINE

## 2024-08-26 PROCEDURE — 25000003 PHARM REV CODE 250: Performed by: EMERGENCY MEDICINE

## 2024-08-26 PROCEDURE — 36415 COLL VENOUS BLD VENIPUNCTURE: CPT

## 2024-08-26 PROCEDURE — 84484 ASSAY OF TROPONIN QUANT: CPT | Mod: 91

## 2024-08-26 PROCEDURE — 25000003 PHARM REV CODE 250: Performed by: PHYSICIAN ASSISTANT

## 2024-08-26 RX ORDER — PREDNISONE 50 MG/1
50 TABLET ORAL 3 TIMES DAILY
Status: DISCONTINUED | OUTPATIENT
Start: 2024-08-26 | End: 2024-08-27 | Stop reason: HOSPADM

## 2024-08-26 RX ORDER — MORPHINE SULFATE 4 MG/ML
4 INJECTION, SOLUTION INTRAMUSCULAR; INTRAVENOUS
Status: COMPLETED | OUTPATIENT
Start: 2024-08-26 | End: 2024-08-26

## 2024-08-26 RX ORDER — FUROSEMIDE 10 MG/ML
40 INJECTION INTRAMUSCULAR; INTRAVENOUS ONCE
Status: COMPLETED | OUTPATIENT
Start: 2024-08-26 | End: 2024-08-26

## 2024-08-26 RX ORDER — CARVEDILOL 3.12 MG/1
3.12 TABLET ORAL 2 TIMES DAILY WITH MEALS
Status: DISCONTINUED | OUTPATIENT
Start: 2024-08-26 | End: 2024-08-27 | Stop reason: HOSPADM

## 2024-08-26 RX ORDER — RANOLAZINE 500 MG/1
1000 TABLET, EXTENDED RELEASE ORAL 2 TIMES DAILY
Status: DISCONTINUED | OUTPATIENT
Start: 2024-08-26 | End: 2024-08-27 | Stop reason: HOSPADM

## 2024-08-26 RX ORDER — DIPHENHYDRAMINE HCL 50 MG
50 CAPSULE ORAL ONCE
Status: COMPLETED | OUTPATIENT
Start: 2024-08-27 | End: 2024-08-27

## 2024-08-26 RX ORDER — ONDANSETRON HYDROCHLORIDE 2 MG/ML
4 INJECTION, SOLUTION INTRAVENOUS
Status: COMPLETED | OUTPATIENT
Start: 2024-08-26 | End: 2024-08-26

## 2024-08-26 RX ORDER — INSULIN ASPART 100 [IU]/ML
0-5 INJECTION, SOLUTION INTRAVENOUS; SUBCUTANEOUS
Status: DISCONTINUED | OUTPATIENT
Start: 2024-08-26 | End: 2024-08-27 | Stop reason: HOSPADM

## 2024-08-26 RX ORDER — CARVEDILOL 12.5 MG/1
25 TABLET ORAL 2 TIMES DAILY WITH MEALS
Status: DISCONTINUED | OUTPATIENT
Start: 2024-08-26 | End: 2024-08-26

## 2024-08-26 RX ORDER — AMLODIPINE BESYLATE 5 MG/1
5 TABLET ORAL DAILY
Status: DISCONTINUED | OUTPATIENT
Start: 2024-08-27 | End: 2024-08-27 | Stop reason: HOSPADM

## 2024-08-26 RX ORDER — ISOSORBIDE MONONITRATE 30 MG/1
30 TABLET, EXTENDED RELEASE ORAL DAILY
Status: DISCONTINUED | OUTPATIENT
Start: 2024-08-26 | End: 2024-08-27 | Stop reason: HOSPADM

## 2024-08-26 RX ORDER — HYDRALAZINE HYDROCHLORIDE 20 MG/ML
10 INJECTION INTRAMUSCULAR; INTRAVENOUS EVERY 6 HOURS PRN
Status: DISCONTINUED | OUTPATIENT
Start: 2024-08-26 | End: 2024-08-27 | Stop reason: HOSPADM

## 2024-08-26 RX ORDER — HEPARIN SODIUM,PORCINE/D5W 25000/250
0-40 INTRAVENOUS SOLUTION INTRAVENOUS CONTINUOUS
Status: DISCONTINUED | OUTPATIENT
Start: 2024-08-26 | End: 2024-08-26

## 2024-08-26 RX ORDER — FAMOTIDINE 10 MG/ML
20 INJECTION INTRAVENOUS
Status: COMPLETED | OUTPATIENT
Start: 2024-08-26 | End: 2024-08-26

## 2024-08-26 RX ORDER — FINASTERIDE 5 MG/1
5 TABLET, FILM COATED ORAL DAILY
Status: DISCONTINUED | OUTPATIENT
Start: 2024-08-26 | End: 2024-08-27 | Stop reason: HOSPADM

## 2024-08-26 RX ORDER — ALUMINUM HYDROXIDE, MAGNESIUM HYDROXIDE, AND SIMETHICONE 1200; 120; 1200 MG/30ML; MG/30ML; MG/30ML
15 SUSPENSION ORAL
Status: COMPLETED | OUTPATIENT
Start: 2024-08-26 | End: 2024-08-26

## 2024-08-26 RX ORDER — PROPAFENONE HYDROCHLORIDE 150 MG/1
150 TABLET, COATED ORAL EVERY 8 HOURS
Status: DISCONTINUED | OUTPATIENT
Start: 2024-08-26 | End: 2024-08-26

## 2024-08-26 RX ORDER — GLUCAGON 1 MG
1 KIT INJECTION
Status: DISCONTINUED | OUTPATIENT
Start: 2024-08-26 | End: 2024-08-27 | Stop reason: HOSPADM

## 2024-08-26 RX ORDER — LEVOTHYROXINE SODIUM 150 UG/1
300 TABLET ORAL EVERY MORNING
Status: DISCONTINUED | OUTPATIENT
Start: 2024-08-27 | End: 2024-08-27 | Stop reason: HOSPADM

## 2024-08-26 RX ORDER — LISINOPRIL 10 MG/1
10 TABLET ORAL DAILY
Status: DISCONTINUED | OUTPATIENT
Start: 2024-08-27 | End: 2024-08-27 | Stop reason: HOSPADM

## 2024-08-26 RX ORDER — NALOXONE HCL 0.4 MG/ML
0.02 VIAL (ML) INJECTION
Status: DISCONTINUED | OUTPATIENT
Start: 2024-08-26 | End: 2024-08-27 | Stop reason: HOSPADM

## 2024-08-26 RX ORDER — SODIUM CHLORIDE 0.9 % (FLUSH) 0.9 %
10 SYRINGE (ML) INJECTION EVERY 12 HOURS PRN
Status: DISCONTINUED | OUTPATIENT
Start: 2024-08-26 | End: 2024-08-27 | Stop reason: HOSPADM

## 2024-08-26 RX ORDER — FAMOTIDINE 20 MG/1
40 TABLET, FILM COATED ORAL DAILY
Status: DISCONTINUED | OUTPATIENT
Start: 2024-08-26 | End: 2024-08-27 | Stop reason: HOSPADM

## 2024-08-26 RX ORDER — IBUPROFEN 200 MG
16 TABLET ORAL
Status: DISCONTINUED | OUTPATIENT
Start: 2024-08-26 | End: 2024-08-27 | Stop reason: HOSPADM

## 2024-08-26 RX ORDER — IBUPROFEN 200 MG
24 TABLET ORAL
Status: DISCONTINUED | OUTPATIENT
Start: 2024-08-26 | End: 2024-08-27 | Stop reason: HOSPADM

## 2024-08-26 RX ORDER — ONDANSETRON HYDROCHLORIDE 2 MG/ML
4 INJECTION, SOLUTION INTRAVENOUS EVERY 8 HOURS PRN
Status: DISCONTINUED | OUTPATIENT
Start: 2024-08-26 | End: 2024-08-27 | Stop reason: HOSPADM

## 2024-08-26 RX ADMIN — PREDNISONE 50 MG: 20 TABLET ORAL at 03:08

## 2024-08-26 RX ADMIN — PREDNISONE 50 MG: 20 TABLET ORAL at 08:08

## 2024-08-26 RX ADMIN — CARVEDILOL 3.12 MG: 3.12 TABLET, FILM COATED ORAL at 05:08

## 2024-08-26 RX ADMIN — FINASTERIDE 5 MG: 5 TABLET, FILM COATED ORAL at 02:08

## 2024-08-26 RX ADMIN — ONDANSETRON 4 MG: 2 INJECTION INTRAMUSCULAR; INTRAVENOUS at 12:08

## 2024-08-26 RX ADMIN — RANOLAZINE 1000 MG: 500 TABLET, EXTENDED RELEASE ORAL at 08:08

## 2024-08-26 RX ADMIN — FUROSEMIDE 40 MG: 10 INJECTION, SOLUTION INTRAMUSCULAR; INTRAVENOUS at 02:08

## 2024-08-26 RX ADMIN — PROPAFENONE HYDROCHLORIDE 150 MG: 150 TABLET, FILM COATED ORAL at 02:08

## 2024-08-26 RX ADMIN — FAMOTIDINE 40 MG: 20 TABLET ORAL at 02:08

## 2024-08-26 RX ADMIN — FAMOTIDINE 20 MG: 10 INJECTION, SOLUTION INTRAVENOUS at 11:08

## 2024-08-26 RX ADMIN — ALUMINUM HYDROXIDE, MAGNESIUM HYDROXIDE, AND SIMETHICONE 15 ML: 1200; 120; 1200 SUSPENSION ORAL at 11:08

## 2024-08-26 RX ADMIN — MORPHINE SULFATE 4 MG: 4 INJECTION INTRAVENOUS at 12:08

## 2024-08-26 RX ADMIN — ISOSORBIDE MONONITRATE 30 MG: 30 TABLET, EXTENDED RELEASE ORAL at 03:08

## 2024-08-26 NOTE — Clinical Note
The catheter was inserted into the left subclavian artery. An angiography was performed of the LIMA to LAD.

## 2024-08-26 NOTE — ASSESSMENT & PLAN NOTE
Chronic, uncontrolled. Latest blood pressure and vitals reviewed-     Temp:  [97.9 °F (36.6 °C)]   Pulse:  [69-74]   Resp:  [17-19]   BP: (130-184)/(60-91)   SpO2:  [96 %-100 %] .   Home meds for hypertension were reviewed and noted below.   Hypertension Medications               carvediloL (COREG) 12.5 MG tablet Take 2 tablets (25 mg total) by mouth 2 (two) times daily with meals.    furosemide (LASIX) 40 MG tablet Take 1 tablet (40 mg total) by mouth 2 (two) times daily.    lisinopriL 10 MG tablet Take 1 tablet by mouth once daily            While in the hospital, will manage blood pressure as follows; Continue home antihypertensive regimen    Will utilize p.r.n. blood pressure medication only if patient's blood pressure greater than 180/110 and he develops symptoms such as worsening chest pain or shortness of breath.

## 2024-08-26 NOTE — CONSULTS
[facility] - Emergency Dept.  Cardiology  Consult Note    Patient Name: Erendira Pretty  MRN: [id number]  Admission Date: 8/26/2024  Hospital Length of Stay: 0 days  Code Status: Full Code   Attending Provider: Tima Hernandez MD   Consulting Provider: Aster Petty PA-C  Primary Care Physician: Dora, Primary Doctor  Principal Problem:Chest pain    Patient information was obtained from patient, past medical records, and ER records.     Inpatient consult to Cardiology  Consult performed by: Aster Petty PA-C  Consult ordered by: Tima Hernandez MD        Subjective:     Chief Complaint:  Chest pain    HPI:   Mr. Pretty is a 79 year old male patient whose current medical conditions include CAD s/p CABG, PVD, mesenteric ischemia s/p SMA stent, claudication, asthma, prior GIB and history of angioectasias, anemia, cirrhosis, AS, COPD, BPH, and PAF (on Xarelto) s/p recent PVI in 6/24 who presented to [facility] ED today due to intermittent substernal chest pain over the past week. Patient described the pain as pressure/tightness and noticed it more so with exertion/ambulation. He denied any radiation of pain or any associated fever, chills, SOB, palpitations, near syncope, or syncope. He initially felt symptoms were due to GERD but became concerned when the pain persisted today. Initial workup in ED revealed negative troponin, BNP of 260, and anemia (H/H 10.4/31.6) and patient was subsequently admitted for further evaluation/observation. Cardiology consulted to assist with management. Patient seen and examined in ED. Currently CP free after receiving morphine. He reports compliance with his medications. Followed on OP basis by Dr. Gallardo. Recently had negative MPI stress test in 6/24. Scheduled to undergo Watchman procedure soon by EP, Dr. Huerta. Followed by GI given history of melena/GIB, recently underwent video endoscopy with no active bleeding noted. TTE from 6/24 showed normal EF, mod AS. Repeat troponin  pending.    Past Medical History:   Diagnosis Date    Anticoagulant long-term use     Aortic stenosis     Asthma     Benign prostatic hyperplasia with nocturia     Bilateral carotid artery stenosis 07/21/2021    US CAROTID BILATERAL 07/14/2021 IMPRESSION: Atherosclerosis with suspected stenosis greater than 50% at the right proximal ICA visually. Velocities are discordant and appear improved from prior. Atherosclerosis on the left with no evidence of flow-limiting stenosis greater than 50%.  FINDINGS: Right: Internal Carotid Artery (ICA): Peak systolic velocity 118 cm/sec. 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 09/22/2020    DELONTE on CPAP     Osteoarthritis     Paroxysmal atrial fibrillation     PVD (peripheral vascular disease)     Secondary esophageal varices without bleeding 06/26/2017    Type 2 diabetes mellitus        Past Surgical History:   Procedure Laterality Date    ABLATION OF ARRHYTHMOGENIC FOCUS FOR ATRIAL FIBRILLATION N/A 6/24/2024    Procedure: Ablation atrial fibrillation;  Surgeon: Horacio Huerta MD;  Location: [facility] EP LAB;  Service: Cardiology;  Laterality: N/A;  afib, PVI, RFA, BERNARD (cx if SR), ZIA, anes, MB, 3 Prep, 3 hours per Dr. Huerta    ANGIOGRAM, EXTREMITY, UNILATERAL Left 1/4/2024    Procedure: ANGIOGRAM, EXTREMITY, UNILATERAL- Femoral / SMS Stent, Poss General;  Surgeon: ALEX Alfred III, MD;  [facility];  Service: Vascular;  Laterality: Left;  mGy : 2698.78  Gy.cm : 229.88  Contrast  : 62ml  Fluro time : 13.8min    CARDIAC CATHETERIZATION      CARDIAC SURGERY      CARPAL TUNNEL RELEASE Left     CATARACT EXTRACTION      CATARACT EXTRACTION W/ INTRAOCULAR LENS  IMPLANT, BILATERAL  2008    CATHETERIZATION OF BOTH LEFT AND RIGHT HEART N/A 11/2/2018    Procedure: CATHETERIZATION, HEART, BOTH LEFT AND RIGHT;  Surgeon: Frank Gallardo MD;  Location: [facility] CATH LAB;  Service: Cardiology;  Laterality: N/A;    COLONOSCOPY  8/2013    COLONOSCOPY N/A 5/30/2016    Procedure: COLONOSCOPY;  Surgeon: Anna Tomas MD;  Location: [facility] ENDO;  Service: Endoscopy;  Laterality: N/A;    COLONOSCOPY N/A 7/17/2019    Procedure: COLONOSCOPY;  Surgeon: David Carter III, MD;  Location: [facility] ENDO;  Service: Endoscopy;  Laterality: N/A;    COLONOSCOPY N/A 12/14/2023    Procedure: COLONOSCOPY;  Surgeon: Ama Barrera MD;  Location: [facility] ENDO;  Service: Endoscopy;  Laterality: N/A;    COLONOSCOPY N/A 7/27/2024    Procedure: COLONOSCOPY;  Surgeon: Ama Barrera MD;  Location: [facility] ENDO;  Service: Endoscopy;  Laterality: N/A;    COMPUTED TOMOGRAPHY N/A 9/9/2019    Procedure: CT (COMPUTED TOMOGRAPHY);  Surgeon: [facility] Diagnostic Provider;  Location: [facility] OR;  Service: General;  Laterality: N/A;  Microwave ablation to be done in CT.  Need CRNA and cart    COMPUTED TOMOGRAPHY N/A 1/25/2022    Procedure: Liver Microwave Ablation;  Surgeon: Red Puentes MD;  Location: [facility];  Service: General;  Laterality: N/A;    CORONARY ARTERY BYPASS GRAFT (CABG) N/A 11/5/2018    Procedure: CORONARY ARTERY BYPASS GRAFT (CABG);  Surgeon: Bear De Luna MD;  Location: [facility] OR;  Service: Cardiothoracic;  Laterality: N/A;  TWO VESSEL BYPASS WITH AORTOTOMY AND EXCISION OF ATHEROSCLEROTIC PLAQUE    CORONARY BYPASS GRAFT ANGIOGRAPHY  3/2/2020    Procedure: Bypass graft study;  Surgeon: Cora Cormier MD;  Location: [facility] CATH LAB;  Service: Cardiology;;    ECHOCARDIOGRAM,TRANSESOPHAGEAL N/A 6/24/2024    Procedure:  Transesophageal echo (BERNARD) intra-procedure log documentation;  Surgeon: Nacho Downs MD;  Location: [facility] EP LAB;  Service: Cardiology;  Laterality: N/A;    ELBOW BURSA SURGERY Right 2010    ENDOSCOPIC HARVEST OF VEIN Left 11/5/2018    Procedure: SURGICAL PROCUREMENT, VEIN, ENDOSCOPIC;  Surgeon: Bear De Luna MD;  Location: [facility];  Service: Cardiothoracic;  Laterality: Left;    ESOPHAGOGASTRODUODENOSCOPY N/A 7/17/2019    Procedure: ESOPHAGOGASTRODUODENOSCOPY (EGD);  Surgeon: David Carter III, MD;  Location: [facility];  Service: Endoscopy;  Laterality: N/A;    ESOPHAGOGASTRODUODENOSCOPY N/A 12/29/2022    Procedure: ESOPHAGOGASTRODUODENOSCOPY (EGD);  Surgeon: Issa Gayle MD;  Location: [facility];  Service: Endoscopy;  Laterality: N/A;    ESOPHAGOGASTRODUODENOSCOPY N/A 1/17/2024    Procedure: EGD (ESOPHAGOGASTRODUODENOSCOPY);  Surgeon: Issa Gayle MD;  Location: [facility];  Service: Endoscopy;  Laterality: N/A;    ESOPHAGOGASTRODUODENOSCOPY N/A 4/30/2024    Procedure: EGD (ESOPHAGOGASTRODUODENOSCOPY);  Surgeon: Eder Foley MD;  Location: [facility];  Service: Gastroenterology;  Laterality: N/A;    ESOPHAGOGASTRODUODENOSCOPY N/A 7/27/2024    Procedure: EGD (ESOPHAGOGASTRODUODENOSCOPY);  Surgeon: Ama Barrera MD;  Location: [facility];  Service: Endoscopy;  Laterality: N/A;    ETHMOIDECTOMY Bilateral 3/27/2019    Procedure: ETHMOIDECTOMY;  Surgeon: Alejandro Parkinson MD;  Location: [facility];  Service: ENT;  Laterality: Bilateral;    EYE SURGERY      FOOT SURGERY      FRONTAL SINUS OBLITERATION Bilateral 3/27/2019    Procedure: SINUSOTOMY, FRONTAL SINUS, OBLITERATIVE;  Surgeon: Alejandro Parkinson MD;  Location: [facility];  Service: ENT;  Laterality: Bilateral;    FUNCTIONAL ENDOSCOPIC SINUS SURGERY (FESS) Bilateral 3/27/2019    Procedure: FESS (FUNCTIONAL ENDOSCOPIC SINUS SURGERY);  Surgeon: Alejandro Parkinson MD;  Location: [facility];  Service: ENT;  Laterality: Bilateral;  Left Keila bullosa  resection     INTRALUMINAL GASTROINTESTINAL TRACT IMAGING VIA CAPSULE N/A 2/2/2024    Procedure: IMAGING PROCEDURE, GI TRACT, INTRALUMINAL, VIA CAPSULE;  Surgeon: Cooper Estrella RN;  Location: [facility] ENDO;  Service: Endoscopy;  Laterality: N/A;    INTRALUMINAL GASTROINTESTINAL TRACT IMAGING VIA CAPSULE N/A 7/31/2024    Procedure: IMAGING PROCEDURE, GI TRACT, INTRALUMINAL, VIA CAPSULE;  Surgeon: Cooper Estrella RN;  Location: [facility] ENDO;  Service: Endoscopy;  Laterality: N/A;    KNEE ARTHROSCOPY W/ MENISCAL REPAIR Left 06/2019    dr boykin    KNEE SURGERY Right     LEFT HEART CATHETERIZATION Left 3/2/2020    Procedure: CATHETERIZATION, HEART, LEFT;  Surgeon: Cora Cormier MD;  Location: [facility];  Service: Cardiology;  Laterality: Left;    LIVER BIOPSY  01/2022    MAXILLARY ANTROSTOMY Bilateral 3/27/2019    Procedure: MAXILLARY ANTROSTOMY;  Surgeon: Alejandro Parkinson MD;  Location: [facility] OR;  Service: ENT;  Laterality: Bilateral;    NASAL SEPTOPLASTY N/A 3/27/2019    Procedure: SEPTOPLASTY, NOSE;  Surgeon: Alejandro Parkinson MD;  Location: [facility] OR;  Service: ENT;  Laterality: N/A;    RECTAL SURGERY  2012    STENT, SUPERIOR MESENTERIC ARTERY Left 1/4/2024    Procedure: STENT, SUPERIOR MESENTERIC ARTERY;  Surgeon: ALEX Alfred III, MD;  Location: [address];  Service: Vascular;  Laterality: Left;    TRANSURETHRAL RESECTION OF PROSTATE (TURP) WITHOUT USE OF LASER N/A 6/19/2018    Procedure: TURP, WITHOUT USING LASER;  Surgeon: oCoper Cordova IV, MD;  Location: [facility];  Service: Urology;  Laterality: N/A;    TRIGGER FINGER RELEASE Left        Review of patient's allergies indicates:   Allergen Reactions    Lipitor [atorvastatin] Other (See Comments)     Muscle aches and pains as well as fatigue.     Niacin preparations Other (See Comments)     Causes broken blood vessels and bruises at 4 times normal dose.    Statins-hmg-coa reductase inhibitors Other (See Comments)     Statins cause intolerable myalgias.    Iodinated  contrast media Hives     Tachycardia    Omeprazole Hives and Itching    Prilosec [omeprazole magnesium] Hives and Itching       Current Facility-Administered Medications on File Prior to Encounter   Medication    lactated ringers infusion     Current Outpatient Medications on File Prior to Encounter   Medication Sig    ACCU-CHEK GRACE PLUS METER Misc AS DIRECTED    albuterol (PROVENTIL HFA) 90 mcg/actuation inhaler Inhale 2 puffs into the lungs every 6 (six) hours as needed for Wheezing. Rescue    aspirin (ECOTRIN) 81 MG EC tablet Take 1 tablet (81 mg total) by mouth once daily.    blood sugar diagnostic (ACCU-CHEK GRACE PLUS TEST STRP) Strp TEST THREE TIMES A DAY    budesonide-formoterol 160-4.5 mcg (SYMBICORT) 160-4.5 mcg/actuation HFAA INHALE 2 PUFFS INTO THE LUNGS TWO TIMES A DAY. (Patient taking differently: Inhale 2 puffs into the lungs every 12 (twelve) hours. INHALE 2 PUFFS INTO THE LUNGS TWO TIMES A DAY.)    carvediloL (COREG) 12.5 MG tablet Take 2 tablets (25 mg total) by mouth 2 (two) times daily with meals.    empagliflozin (JARDIANCE) 25 mg tablet Take 1 tablet (25 mg total) by mouth once daily.    famotidine (PEPCID) 40 MG tablet Take 40 mg by mouth once daily.    finasteride (PROSCAR) 5 mg tablet TAKE 1 TABLET BY MOUTH once daily    furosemide (LASIX) 40 MG tablet Take 1 tablet (40 mg total) by mouth 2 (two) times daily.    GLUCOSAMINE HCL/CHONDR ROSARIO A NA (OSTEO BI-FLEX ORAL) Take 1 tablet by mouth 2 (two) times daily.     krill oil 500 mg Cap Take 1 capsule by mouth Daily.    lancets (ACCU-CHEK FASTCLIX LANCET DRUM) Misc TEST TWO TIMES A DAY    LANTUS SOLOSTAR U-100 INSULIN 100 unit/mL (3 mL) InPn pen INJECT 44 UNITS UNDER THE SKIN EVERY EVENING    levothyroxine (SYNTHROID) 300 MCG Tab Take 1 tablet (300 mcg total) by mouth every morning.    lisinopriL 10 MG tablet Take 1 tablet by mouth once daily    metFORMIN (GLUCOPHAGE-XR) 500 MG ER 24hr tablet Take 1 tablet (500 mg total) by mouth 2 (two) times  "daily with meals.    mirabegron (MYRBETRIQ) 25 mg Tb24 ER tablet Take 1 tablet (25 mg total) by mouth once daily.    montelukast (SINGULAIR) 10 mg tablet Take 1 tablet (10 mg total) by mouth once daily.    pen needle, diabetic (BD ULTRA-FINE MINI PEN NEEDLE) 31 gauge x 3/16" Ndle USE AS DIRECTED    RABEprazole (ACIPHEX) 20 mg tablet Take 1 tablet (20 mg total) by mouth 2 (two) times daily.    ranolazine (RANEXA) 1,000 mg Tb12 Take 1 tablet (1,000 mg total) by mouth 2 (two) times daily.    REPATHA SURECLICK 140 mg/mL PnIj INJECT 140mg subcutaneously every 14 days (Patient taking differently: Inject 140 mg into the skin every 14 (fourteen) days.)    rivaroxaban (XARELTO) 20 mg Tab Take 20 mg by mouth daily with dinner or evening meal.    inclisiran (LEQVIO) 284 mg/1.5 mL Syrg subcutaneous injection Inject 1.5 mLs (284 mg total) into the skin every 3 (three) months.    inclisiran (LEQVIO) 284 mg/1.5 mL Syrg subcutaneous injection Inject 1.5 mLs (284 mg total) into the skin every 6 (six) months.    propafenone (RHTHYMOL) 150 MG Tab Take 1 tablet (150 mg total) by mouth every 8 (eight) hours.    sildenafiL (VIAGRA) 100 MG tablet Take 1/2 to 1 tablet by mouth one hour prior to intercourse. Max 100mg per day     Family History       Problem Relation (Age of Onset)    Heart disease Mother, Father, Brother          Tobacco Use    Smoking status: Former     Current packs/day: 0.00     Average packs/day: 0.5 packs/day for 4.0 years (2.0 ttl pk-yrs)     Types: Cigarettes     Start date: 1968     Quit date: 1972     Years since quittin.2    Smokeless tobacco: Former     Types: Chew     Quit date: 1976   Substance and Sexual Activity    Alcohol use: Not Currently    Drug use: No    Sexual activity: Yes     Partners: Female     Review of Systems   Constitutional: Negative.   HENT: Negative.     Eyes: Negative.    Cardiovascular:  Positive for chest pain.   Respiratory: Negative.     Endocrine: Negative.  "   Hematologic/Lymphatic: Negative.    Skin: Negative.    Musculoskeletal:  Positive for arthritis and joint pain.   Gastrointestinal: Negative.    Genitourinary: Negative.    Neurological: Negative.    Psychiatric/Behavioral: Negative.     Allergic/Immunologic: Negative.      Objective:     Vital Signs (Most Recent):  Temp: 97.9 °F (36.6 °C) (08/26/24 1048)  Pulse: 70 (08/26/24 1318)  Resp: 18 (08/26/24 1318)  BP: (!) 184/91 (08/26/24 1318)  SpO2: 100 % (08/26/24 1318) Vital Signs (24h Range):  Temp:  [97.9 °F (36.6 °C)] 97.9 °F (36.6 °C)  Pulse:  [69-74] 70  Resp:  [17-19] 18  SpO2:  [96 %-100 %] 100 %  BP: (130-184)/(60-91) 184/91     Weight: 102.7 kg (226 lb 6.6 oz)  Body mass index is 35.46 kg/m².    SpO2: 100 %       No intake or output data in the 24 hours ending 08/26/24 1430    Lines/Drains/Airways       Peripheral Intravenous Line  Duration                  Peripheral IV - Single Lumen 08/26/24 1129 20 G Left Antecubital <1 day                     Physical Exam  Vitals and nursing note reviewed.   Constitutional:       General: He is not in acute distress.     Appearance: Normal appearance. He is well-developed. He is not diaphoretic.   HENT:      Head: Normocephalic and atraumatic.   Eyes:      General:         Right eye: No discharge.         Left eye: No discharge.      Pupils: Pupils are equal, round, and reactive to light.   Cardiovascular:      Rate and Rhythm: Normal rate and regular rhythm. Extrasystoles are present.     Heart sounds: Normal heart sounds, S1 normal and S2 normal. No murmur heard.     Comments: Sternotomy site well-healed  Pulmonary:      Effort: Pulmonary effort is normal. No respiratory distress.      Breath sounds: Normal breath sounds.   Abdominal:      General: There is no distension.   Musculoskeletal:      Right lower leg: No edema.      Left lower leg: No edema.   Skin:     General: Skin is warm and dry.      Findings: No erythema.   Neurological:      Mental Status: He is  alert and oriented to person, place, and time.   Psychiatric:         Mood and Affect: Mood normal.         Behavior: Behavior normal.         Thought Content: Thought content normal.          Significant Labs: CMP   Recent Labs   Lab 08/26/24  1133      K 3.8      CO2 25   *   BUN 22   CREATININE 1.1   CALCIUM 8.5*   PROT 6.5   ALBUMIN 3.4*   BILITOT 0.2   ALKPHOS 65   AST 17   ALT 11   ANIONGAP 10   , CBC   Recent Labs   Lab 08/26/24  1133   WBC 3.88*   HGB 10.4*   HCT 31.6*   *   , Troponin   Recent Labs   Lab 08/26/24  1133   TROPONINI 0.015   , and All pertinent lab results from the last 24 hours have been reviewed.    Significant Imaging: Echocardiogram: Transthoracic echo (TTE) complete (Cupid Only):   Results for orders placed or performed during the hospital encounter of 06/11/24   Echo   Result Value Ref Range    BSA 2.19 m2    LVOT stroke volume 81.02 cm3    LVIDd 4.37 3.5 - 6.0 cm    LV Systolic Volume 38.77 mL    LV Systolic Volume Index 18.3 mL/m2    LVIDs 3.13 2.1 - 4.0 cm    LV Diastolic Volume 86.07 mL    LV Diastolic Volume Index 40.60 mL/m2    IVS 1.45 (A) 0.6 - 1.1 cm    LVOT diameter 2.04 cm    LVOT area 3.3 cm2    FS 28 28 - 44 %    Left Ventricle Relative Wall Thickness 0.65 cm    Posterior Wall 1.42 (A) 0.6 - 1.1 cm    LV mass 246.91 g    LV Mass Index 116 g/m2    MV Peak E Delbert 1.10 m/s    TDI LATERAL 0.11 m/s    TDI SEPTAL 0.07 m/s    E/E' ratio 12.22 m/s    MV Peak A Delbert 1.34 m/s    TR Max Delbert 2.76 m/s    E/A ratio 0.82     IVRT 74.22 msec    E wave deceleration time 246.09 msec    LV SEPTAL E/E' RATIO 15.71 m/s    LV LATERAL E/E' RATIO 10.00 m/s    LVOT peak delbert 0.97 m/s    Left Ventricular Outflow Tract Mean Velocity 0.64 cm/s    Left Ventricular Outflow Tract Mean Gradient 1.94 mmHg    RVOT peak VTI 13.7 cm    TAPSE 1.38 cm    LA size 4.08 cm    Left Atrium Minor Axis 5.61 cm    Left Atrium Major Axis 6.20 cm    RA Major Axis 5.45 cm    AV regurgitation pressure  1/2 time [id number] ms    AR Max Delbert 3.80 m/s    AV mean gradient 20 mmHg    AV peak gradient 32 mmHg    Ao peak delbert 2.84 m/s    Ao VTI 68.90 cm    LVOT peak VTI 24.80 cm    AV valve area 1.18 cm²    AV Velocity Ratio 0.34     AV index (prosthetic) 0.36     RADHA by Velocity Ratio 1.12 cm²    Triscuspid Valve Regurgitation Peak Gradient 30 mmHg    PV mean gradient 1 mmHg    RVOT peak delbert 0.68 m/s    Ao root annulus 3.52 cm    STJ 3.68 cm    Ascending aorta 3.60 cm    IVC diameter 1.48 cm    Mean e' 0.09 m/s    ZLVIDS -2.11     ZLVIDD -4.27     LA Volume Index 43.4 mL/m2    LA volume 91.92 cm3    LA WIDTH 4.5 cm    RA Width 4.1 cm    TV resting pulmonary artery pressure 33 mmHg    RV TB RVSP 6 mmHg    Est. RA pres 3 mmHg    Narrative      Left Ventricle: The left ventricle is normal in size. Increased wall   thickness. There is moderate asymmetric hypertrophy. Septal motion is   consistent with post-operative status. There is normal systolic function   with a visually estimated ejection fraction of 55 - 60%. Grade II   diastolic dysfunction.    Right Ventricle: Mild right ventricular enlargement. Wall thickness is   normal. Systolic function is mildly reduced.    Left Atrium: Left atrium is moderately dilated.    Aortic Valve: There is severe aortic valve sclerosis. Moderately   restricted motion. There is moderate stenosis. Aortic valve area by VTI is   1.18 cm². Aortic valve peak velocity is 2.84 m/s. Mean gradient is 20   mmHg. The dimensionless index is 0.36. There is mild aortic regurgitation.    Mitral Valve: There is mild regurgitation.    Tricuspid Valve: There is moderate regurgitation.    Pulmonary Artery: The estimated pulmonary artery systolic pressure is   33 mmHg.    IVC/SVC: Normal venous pressure at 3 mmHg.     , EKG: Reviewed, and X-Ray: CXR: X-Ray Chest 1 View (CXR): No results found for this visit on 08/26/24. and X-Ray Chest PA and Lateral (CXR): No results found for this visit on  08/26/24.  Assessment and Plan:   Patient with history of CABG who presents with CP, concerning for angina. Initial troponin negative, will continue to trend. Add Imdur and amlodipine and assess response. Repeat limited TTE. Keep NPO after MN.    * Chest pain  -Patient with history of CABG who presents with exertional CP, concerning for angina  -Reported relief with morphine given in ED  -Initial troponin negative, continue to trend  -Continue ASA, BB, ACEi, Ranexa  -Consider addition of nitrates and amlodipine  -Recent MPI stress test from 6/24 negative  -TTE 6/24 with normal EF, mod AS, repeat limited   -Will consider [facility] tm for definitive evaluation, keep NPO after MN    Paroxysmal atrial fibrillation  -Currently in SR, s/p PVI June 2024 by Dr. Huerta  -EKG reviewed, prolonged 1st degree AV block  -Coreg dosage adjusted to 3.125 mg BID  -Continue same dose of Rythmol for now  -EP asked to review  -Heparin gtt for AC    Mixed hyperlipidemia  -Repatha as OP    Nonrheumatic aortic valve stenosis  -Moderate by last TTE    PVD (peripheral vascular disease)  -ASA  -On Repatha as OP    Essential hypertension  -Continue home meds as tolerated  -Consider amlodipine  -Coreg dosage adjusted given EKG findings        VTE Risk Mitigation (From admission, onward)           Ordered     heparin 25,000 units in dextrose 5% (100 units/ml) IV bolus from bag LOW INTENSITY nomogram - [facility]  As needed (PRN)        Question:  Heparin Infusion Adjustment (DO NOT MODIFY ANSWER)  Answer:  \[email or web address]\epic\Images\Pharmacy\HeparinInfusions\heparin LOW INTENSITY nomogram for [facility] [id number]    08/26/24 1347     heparin 25,000 units in dextrose 5% (100 units/ml) IV bolus from bag LOW INTENSITY nomogram - [facility]  As needed (PRN)        Question:  Heparin Infusion Adjustment (DO NOT MODIFY ANSWER)  Answer:  \\[email or web address]\epic\Images\Pharmacy\HeparinInfusions\heparin LOW INTENSITY nomogram for [facility] [id number]    08/26/24 1347     IP VTE HIGH RISK  PATIENT  Once         08/26/24 1402     Place sequential compression device  Until discontinued         08/26/24 1402     Reason for No Pharmacological VTE Prophylaxis  Once        Question:  Reasons:  Answer:  Physician Provided (leave comment)  Comment:  on heparin gtt    08/26/24 1402     heparin 25,000 units in dextrose 5% (100 units/ml) IV bolus from bag LOW INTENSITY nomogram - [facility]  Once        Question:  Heparin Infusion Adjustment (DO NOT MODIFY ANSWER)  Answer:  \\[email or web address]\epic\Images\Pharmacy\HeparinInfusions\heparin LOW INTENSITY nomogram for [facility] [id number]    08/26/24 1347     heparin 25,000 units in dextrose 5% 250 mL (100 units/mL) infusion LOW INTENSITY nomogram - [facility]  Continuous        Question:  Begin at (units/kg/hr)  Answer:  12    08/26/24 1348                    Thank you for your consult. I will follow-up with patient. Please contact us if you have any additional questions.    Aster Petty PA-C  Cardiology   [facility] - Emergency Dept.

## 2024-08-26 NOTE — H&P (VIEW-ONLY)
[facility] - Emergency Dept.  Cardiology  Consult Note    Patient Name: Erendira Pretty  MRN: [id number]  Admission Date: 8/26/2024  Hospital Length of Stay: 0 days  Code Status: Full Code   Attending Provider: iTma Hernandez MD   Consulting Provider: Aster Petty PA-C  Primary Care Physician: Dora, Primary Doctor  Principal Problem:Chest pain    Patient information was obtained from patient, past medical records, and ER records.     Inpatient consult to Cardiology  Consult performed by: Aster Petty PA-C  Consult ordered by: Tima Hernandez MD        Subjective:     Chief Complaint:  Chest pain    HPI:   Mr. Pretty is a 79 year old male patient whose current medical conditions include CAD s/p CABG, PVD, mesenteric ischemia s/p SMA stent, claudication, asthma, prior GIB and history of angioectasias, anemia, cirrhosis, AS, COPD, BPH, and PAF (on Xarelto) s/p recent PVI in 6/24 who presented to [facility] ED today due to intermittent substernal chest pain over the past week. Patient described the pain as pressure/tightness and noticed it more so with exertion/ambulation. He denied any radiation of pain or any associated fever, chills, SOB, palpitations, near syncope, or syncope. He initially felt symptoms were due to GERD but became concerned when the pain persisted today. Initial workup in ED revealed negative troponin, BNP of 260, and anemia (H/H 10.4/31.6) and patient was subsequently admitted for further evaluation/observation. Cardiology consulted to assist with management. Patient seen and examined in ED. Currently CP free after receiving morphine. He reports compliance with his medications. Followed on OP basis by Dr. Gallardo. Recently had negative MPI stress test in 6/24. Scheduled to undergo Watchman procedure soon by EP, Dr. Huerta. Followed by GI given history of melena/GIB, recently underwent video endoscopy with no active bleeding noted. TTE from 6/24 showed normal EF, mod AS. Repeat troponin  pending.    Past Medical History:   Diagnosis Date    Anticoagulant long-term use     Aortic stenosis     Asthma     Benign prostatic hyperplasia with nocturia     Bilateral carotid artery stenosis 07/21/2021    US CAROTID BILATERAL 07/14/2021 IMPRESSION: Atherosclerosis with suspected stenosis greater than 50% at the right proximal ICA visually. Velocities are discordant and appear improved from prior. Atherosclerosis on the left with no evidence of flow-limiting stenosis greater than 50%.  FINDINGS: Right: Internal Carotid Artery (ICA): Peak systolic velocity 118 cm/sec. 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 09/22/2020    DELONTE on CPAP     Osteoarthritis     Paroxysmal atrial fibrillation     PVD (peripheral vascular disease)     Secondary esophageal varices without bleeding 06/26/2017    Type 2 diabetes mellitus        Past Surgical History:   Procedure Laterality Date    ABLATION OF ARRHYTHMOGENIC FOCUS FOR ATRIAL FIBRILLATION N/A 6/24/2024    Procedure: Ablation atrial fibrillation;  Surgeon: Horacio Huerta MD;  Location: [facility] EP LAB;  Service: Cardiology;  Laterality: N/A;  afib, PVI, RFA, BERNARD (cx if SR), ZIA, anes, MB, 3 Prep, 3 hours per Dr. Huerta    ANGIOGRAM, EXTREMITY, UNILATERAL Left 1/4/2024    Procedure: ANGIOGRAM, EXTREMITY, UNILATERAL- Femoral / SMS Stent, Poss General;  Surgeon: ALEX Alfred III, MD;  [facility];  Service: Vascular;  Laterality: Left;  mGy : 2698.78  Gy.cm : 229.88  Contrast  : 62ml  Fluro time : 13.8min    CARDIAC CATHETERIZATION      CARDIAC SURGERY      CARPAL TUNNEL RELEASE Left     CATARACT EXTRACTION      CATARACT EXTRACTION W/ INTRAOCULAR LENS  IMPLANT, BILATERAL  2008    CATHETERIZATION OF BOTH LEFT AND RIGHT HEART N/A 11/2/2018    Procedure: CATHETERIZATION, HEART, BOTH LEFT AND RIGHT;  Surgeon: Frank Gallardo MD;  Location: [facility] CATH LAB;  Service: Cardiology;  Laterality: N/A;    COLONOSCOPY  8/2013    COLONOSCOPY N/A 5/30/2016    Procedure: COLONOSCOPY;  Surgeon: Anna Tomas MD;  Location: [facility] ENDO;  Service: Endoscopy;  Laterality: N/A;    COLONOSCOPY N/A 7/17/2019    Procedure: COLONOSCOPY;  Surgeon: David Carter III, MD;  Location: [facility] ENDO;  Service: Endoscopy;  Laterality: N/A;    COLONOSCOPY N/A 12/14/2023    Procedure: COLONOSCOPY;  Surgeon: Ama Barrera MD;  Location: [facility] ENDO;  Service: Endoscopy;  Laterality: N/A;    COLONOSCOPY N/A 7/27/2024    Procedure: COLONOSCOPY;  Surgeon: Ama Barrera MD;  Location: [facility] ENDO;  Service: Endoscopy;  Laterality: N/A;    COMPUTED TOMOGRAPHY N/A 9/9/2019    Procedure: CT (COMPUTED TOMOGRAPHY);  Surgeon: [facility] Diagnostic Provider;  Location: [facility] OR;  Service: General;  Laterality: N/A;  Microwave ablation to be done in CT.  Need CRNA and cart    COMPUTED TOMOGRAPHY N/A 1/25/2022    Procedure: Liver Microwave Ablation;  Surgeon: Red Puentes MD;  Location: [facility];  Service: General;  Laterality: N/A;    CORONARY ARTERY BYPASS GRAFT (CABG) N/A 11/5/2018    Procedure: CORONARY ARTERY BYPASS GRAFT (CABG);  Surgeon: Bear De Luna MD;  Location: [facility] OR;  Service: Cardiothoracic;  Laterality: N/A;  TWO VESSEL BYPASS WITH AORTOTOMY AND EXCISION OF ATHEROSCLEROTIC PLAQUE    CORONARY BYPASS GRAFT ANGIOGRAPHY  3/2/2020    Procedure: Bypass graft study;  Surgeon: Cora Cormier MD;  Location: [facility] CATH LAB;  Service: Cardiology;;    ECHOCARDIOGRAM,TRANSESOPHAGEAL N/A 6/24/2024    Procedure:  Transesophageal echo (BERNARD) intra-procedure log documentation;  Surgeon: Nacho Downs MD;  Location: [facility] EP LAB;  Service: Cardiology;  Laterality: N/A;    ELBOW BURSA SURGERY Right 2010    ENDOSCOPIC HARVEST OF VEIN Left 11/5/2018    Procedure: SURGICAL PROCUREMENT, VEIN, ENDOSCOPIC;  Surgeon: Bear De Luna MD;  Location: [facility];  Service: Cardiothoracic;  Laterality: Left;    ESOPHAGOGASTRODUODENOSCOPY N/A 7/17/2019    Procedure: ESOPHAGOGASTRODUODENOSCOPY (EGD);  Surgeon: David Carter III, MD;  Location: [facility];  Service: Endoscopy;  Laterality: N/A;    ESOPHAGOGASTRODUODENOSCOPY N/A 12/29/2022    Procedure: ESOPHAGOGASTRODUODENOSCOPY (EGD);  Surgeon: Issa Gayle MD;  Location: [facility];  Service: Endoscopy;  Laterality: N/A;    ESOPHAGOGASTRODUODENOSCOPY N/A 1/17/2024    Procedure: EGD (ESOPHAGOGASTRODUODENOSCOPY);  Surgeon: Issa Gayle MD;  Location: [facility];  Service: Endoscopy;  Laterality: N/A;    ESOPHAGOGASTRODUODENOSCOPY N/A 4/30/2024    Procedure: EGD (ESOPHAGOGASTRODUODENOSCOPY);  Surgeon: Eder Foley MD;  Location: [facility];  Service: Gastroenterology;  Laterality: N/A;    ESOPHAGOGASTRODUODENOSCOPY N/A 7/27/2024    Procedure: EGD (ESOPHAGOGASTRODUODENOSCOPY);  Surgeon: Ama Barrera MD;  Location: [facility];  Service: Endoscopy;  Laterality: N/A;    ETHMOIDECTOMY Bilateral 3/27/2019    Procedure: ETHMOIDECTOMY;  Surgeon: Alejandro Parkinson MD;  Location: [facility];  Service: ENT;  Laterality: Bilateral;    EYE SURGERY      FOOT SURGERY      FRONTAL SINUS OBLITERATION Bilateral 3/27/2019    Procedure: SINUSOTOMY, FRONTAL SINUS, OBLITERATIVE;  Surgeon: Alejandro Parkinson MD;  Location: [facility];  Service: ENT;  Laterality: Bilateral;    FUNCTIONAL ENDOSCOPIC SINUS SURGERY (FESS) Bilateral 3/27/2019    Procedure: FESS (FUNCTIONAL ENDOSCOPIC SINUS SURGERY);  Surgeon: Alejandro Parkinson MD;  Location: [facility];  Service: ENT;  Laterality: Bilateral;  Left Keila bullosa  resection     INTRALUMINAL GASTROINTESTINAL TRACT IMAGING VIA CAPSULE N/A 2/2/2024    Procedure: IMAGING PROCEDURE, GI TRACT, INTRALUMINAL, VIA CAPSULE;  Surgeon: Cooper Estrella RN;  Location: [facility] ENDO;  Service: Endoscopy;  Laterality: N/A;    INTRALUMINAL GASTROINTESTINAL TRACT IMAGING VIA CAPSULE N/A 7/31/2024    Procedure: IMAGING PROCEDURE, GI TRACT, INTRALUMINAL, VIA CAPSULE;  Surgeon: Cooper Estrella RN;  Location: [facility] ENDO;  Service: Endoscopy;  Laterality: N/A;    KNEE ARTHROSCOPY W/ MENISCAL REPAIR Left 06/2019    dr boykin    KNEE SURGERY Right     LEFT HEART CATHETERIZATION Left 3/2/2020    Procedure: CATHETERIZATION, HEART, LEFT;  Surgeon: Cora Cormier MD;  Location: [facility] CATH LAB;  Service: Cardiology;  Laterality: Left;    LIVER BIOPSY  01/2022    MAXILLARY ANTROSTOMY Bilateral 3/27/2019    Procedure: MAXILLARY ANTROSTOMY;  Surgeon: Alejandro Parkinson MD;  Location: [facility] OR;  Service: ENT;  Laterality: Bilateral;    NASAL SEPTOPLASTY N/A 3/27/2019    Procedure: SEPTOPLASTY, NOSE;  Surgeon: Alejandro Parkinson MD;  Location: [facility] OR;  Service: ENT;  Laterality: N/A;    RECTAL SURGERY  2012    STENT, SUPERIOR MESENTERIC ARTERY Left 1/4/2024    Procedure: STENT, SUPERIOR MESENTERIC ARTERY;  Surgeon: ALEX Alfred III, MD;  Location: [address];  Service: Vascular;  Laterality: Left;    TRANSURETHRAL RESECTION OF PROSTATE (TURP) WITHOUT USE OF LASER N/A 6/19/2018    Procedure: TURP, WITHOUT USING LASER;  Surgeon: Cooper Cordova IV, MD;  Location: [facility];  Service: Urology;  Laterality: N/A;    TRIGGER FINGER RELEASE Left        Review of patient's allergies indicates:   Allergen Reactions    Lipitor [atorvastatin] Other (See Comments)     Muscle aches and pains as well as fatigue.     Niacin preparations Other (See Comments)     Causes broken blood vessels and bruises at 4 times normal dose.    Statins-hmg-coa reductase inhibitors Other (See Comments)     Statins cause intolerable myalgias.    Iodinated  contrast media Hives     Tachycardia    Omeprazole Hives and Itching    Prilosec [omeprazole magnesium] Hives and Itching       Current Facility-Administered Medications on File Prior to Encounter   Medication    lactated ringers infusion     Current Outpatient Medications on File Prior to Encounter   Medication Sig    ACCU-CHEK GRACE PLUS METER Misc AS DIRECTED    albuterol (PROVENTIL HFA) 90 mcg/actuation inhaler Inhale 2 puffs into the lungs every 6 (six) hours as needed for Wheezing. Rescue    aspirin (ECOTRIN) 81 MG EC tablet Take 1 tablet (81 mg total) by mouth once daily.    blood sugar diagnostic (ACCU-CHEK GRACE PLUS TEST STRP) Strp TEST THREE TIMES A DAY    budesonide-formoterol 160-4.5 mcg (SYMBICORT) 160-4.5 mcg/actuation HFAA INHALE 2 PUFFS INTO THE LUNGS TWO TIMES A DAY. (Patient taking differently: Inhale 2 puffs into the lungs every 12 (twelve) hours. INHALE 2 PUFFS INTO THE LUNGS TWO TIMES A DAY.)    carvediloL (COREG) 12.5 MG tablet Take 2 tablets (25 mg total) by mouth 2 (two) times daily with meals.    empagliflozin (JARDIANCE) 25 mg tablet Take 1 tablet (25 mg total) by mouth once daily.    famotidine (PEPCID) 40 MG tablet Take 40 mg by mouth once daily.    finasteride (PROSCAR) 5 mg tablet TAKE 1 TABLET BY MOUTH once daily    furosemide (LASIX) 40 MG tablet Take 1 tablet (40 mg total) by mouth 2 (two) times daily.    GLUCOSAMINE HCL/CHONDR ROSARIO A NA (OSTEO BI-FLEX ORAL) Take 1 tablet by mouth 2 (two) times daily.     krill oil 500 mg Cap Take 1 capsule by mouth Daily.    lancets (ACCU-CHEK FASTCLIX LANCET DRUM) Misc TEST TWO TIMES A DAY    LANTUS SOLOSTAR U-100 INSULIN 100 unit/mL (3 mL) InPn pen INJECT 44 UNITS UNDER THE SKIN EVERY EVENING    levothyroxine (SYNTHROID) 300 MCG Tab Take 1 tablet (300 mcg total) by mouth every morning.    lisinopriL 10 MG tablet Take 1 tablet by mouth once daily    metFORMIN (GLUCOPHAGE-XR) 500 MG ER 24hr tablet Take 1 tablet (500 mg total) by mouth 2 (two) times  "daily with meals.    mirabegron (MYRBETRIQ) 25 mg Tb24 ER tablet Take 1 tablet (25 mg total) by mouth once daily.    montelukast (SINGULAIR) 10 mg tablet Take 1 tablet (10 mg total) by mouth once daily.    pen needle, diabetic (BD ULTRA-FINE MINI PEN NEEDLE) 31 gauge x 3/16" Ndle USE AS DIRECTED    RABEprazole (ACIPHEX) 20 mg tablet Take 1 tablet (20 mg total) by mouth 2 (two) times daily.    ranolazine (RANEXA) 1,000 mg Tb12 Take 1 tablet (1,000 mg total) by mouth 2 (two) times daily.    REPATHA SURECLICK 140 mg/mL PnIj INJECT 140mg subcutaneously every 14 days (Patient taking differently: Inject 140 mg into the skin every 14 (fourteen) days.)    rivaroxaban (XARELTO) 20 mg Tab Take 20 mg by mouth daily with dinner or evening meal.    inclisiran (LEQVIO) 284 mg/1.5 mL Syrg subcutaneous injection Inject 1.5 mLs (284 mg total) into the skin every 3 (three) months.    inclisiran (LEQVIO) 284 mg/1.5 mL Syrg subcutaneous injection Inject 1.5 mLs (284 mg total) into the skin every 6 (six) months.    propafenone (RHTHYMOL) 150 MG Tab Take 1 tablet (150 mg total) by mouth every 8 (eight) hours.    sildenafiL (VIAGRA) 100 MG tablet Take 1/2 to 1 tablet by mouth one hour prior to intercourse. Max 100mg per day     Family History       Problem Relation (Age of Onset)    Heart disease Mother, Father, Brother          Tobacco Use    Smoking status: Former     Current packs/day: 0.00     Average packs/day: 0.5 packs/day for 4.0 years (2.0 ttl pk-yrs)     Types: Cigarettes     Start date: 1968     Quit date: 1972     Years since quittin.2    Smokeless tobacco: Former     Types: Chew     Quit date: 1976   Substance and Sexual Activity    Alcohol use: Not Currently    Drug use: No    Sexual activity: Yes     Partners: Female     Review of Systems   Constitutional: Negative.   HENT: Negative.     Eyes: Negative.    Cardiovascular:  Positive for chest pain.   Respiratory: Negative.     Endocrine: Negative.  "   Hematologic/Lymphatic: Negative.    Skin: Negative.    Musculoskeletal:  Positive for arthritis and joint pain.   Gastrointestinal: Negative.    Genitourinary: Negative.    Neurological: Negative.    Psychiatric/Behavioral: Negative.     Allergic/Immunologic: Negative.      Objective:     Vital Signs (Most Recent):  Temp: 97.9 °F (36.6 °C) (08/26/24 1048)  Pulse: 70 (08/26/24 1318)  Resp: 18 (08/26/24 1318)  BP: (!) 184/91 (08/26/24 1318)  SpO2: 100 % (08/26/24 1318) Vital Signs (24h Range):  Temp:  [97.9 °F (36.6 °C)] 97.9 °F (36.6 °C)  Pulse:  [69-74] 70  Resp:  [17-19] 18  SpO2:  [96 %-100 %] 100 %  BP: (130-184)/(60-91) 184/91     Weight: 102.7 kg (226 lb 6.6 oz)  Body mass index is 35.46 kg/m².    SpO2: 100 %       No intake or output data in the 24 hours ending 08/26/24 1430    Lines/Drains/Airways       Peripheral Intravenous Line  Duration                  Peripheral IV - Single Lumen 08/26/24 1129 20 G Left Antecubital <1 day                     Physical Exam  Vitals and nursing note reviewed.   Constitutional:       General: He is not in acute distress.     Appearance: Normal appearance. He is well-developed. He is not diaphoretic.   HENT:      Head: Normocephalic and atraumatic.   Eyes:      General:         Right eye: No discharge.         Left eye: No discharge.      Pupils: Pupils are equal, round, and reactive to light.   Cardiovascular:      Rate and Rhythm: Normal rate and regular rhythm. Extrasystoles are present.     Heart sounds: Normal heart sounds, S1 normal and S2 normal. No murmur heard.     Comments: Sternotomy site well-healed  Pulmonary:      Effort: Pulmonary effort is normal. No respiratory distress.      Breath sounds: Normal breath sounds.   Abdominal:      General: There is no distension.   Musculoskeletal:      Right lower leg: No edema.      Left lower leg: No edema.   Skin:     General: Skin is warm and dry.      Findings: No erythema.   Neurological:      Mental Status: He is  alert and oriented to person, place, and time.   Psychiatric:         Mood and Affect: Mood normal.         Behavior: Behavior normal.         Thought Content: Thought content normal.          Significant Labs: CMP   Recent Labs   Lab 08/26/24  1133      K 3.8      CO2 25   *   BUN 22   CREATININE 1.1   CALCIUM 8.5*   PROT 6.5   ALBUMIN 3.4*   BILITOT 0.2   ALKPHOS 65   AST 17   ALT 11   ANIONGAP 10   , CBC   Recent Labs   Lab 08/26/24  1133   WBC 3.88*   HGB 10.4*   HCT 31.6*   *   , Troponin   Recent Labs   Lab 08/26/24  1133   TROPONINI 0.015   , and All pertinent lab results from the last 24 hours have been reviewed.    Significant Imaging: Echocardiogram: Transthoracic echo (TTE) complete (Cupid Only):   Results for orders placed or performed during the hospital encounter of 06/11/24   Echo   Result Value Ref Range    BSA 2.19 m2    LVOT stroke volume 81.02 cm3    LVIDd 4.37 3.5 - 6.0 cm    LV Systolic Volume 38.77 mL    LV Systolic Volume Index 18.3 mL/m2    LVIDs 3.13 2.1 - 4.0 cm    LV Diastolic Volume 86.07 mL    LV Diastolic Volume Index 40.60 mL/m2    IVS 1.45 (A) 0.6 - 1.1 cm    LVOT diameter 2.04 cm    LVOT area 3.3 cm2    FS 28 28 - 44 %    Left Ventricle Relative Wall Thickness 0.65 cm    Posterior Wall 1.42 (A) 0.6 - 1.1 cm    LV mass 246.91 g    LV Mass Index 116 g/m2    MV Peak E Delbert 1.10 m/s    TDI LATERAL 0.11 m/s    TDI SEPTAL 0.07 m/s    E/E' ratio 12.22 m/s    MV Peak A Delbert 1.34 m/s    TR Max Delbert 2.76 m/s    E/A ratio 0.82     IVRT 74.22 msec    E wave deceleration time 246.09 msec    LV SEPTAL E/E' RATIO 15.71 m/s    LV LATERAL E/E' RATIO 10.00 m/s    LVOT peak delbert 0.97 m/s    Left Ventricular Outflow Tract Mean Velocity 0.64 cm/s    Left Ventricular Outflow Tract Mean Gradient 1.94 mmHg    RVOT peak VTI 13.7 cm    TAPSE 1.38 cm    LA size 4.08 cm    Left Atrium Minor Axis 5.61 cm    Left Atrium Major Axis 6.20 cm    RA Major Axis 5.45 cm    AV regurgitation pressure  1/2 time [id number] ms    AR Max Delbert 3.80 m/s    AV mean gradient 20 mmHg    AV peak gradient 32 mmHg    Ao peak delbert 2.84 m/s    Ao VTI 68.90 cm    LVOT peak VTI 24.80 cm    AV valve area 1.18 cm²    AV Velocity Ratio 0.34     AV index (prosthetic) 0.36     RADHA by Velocity Ratio 1.12 cm²    Triscuspid Valve Regurgitation Peak Gradient 30 mmHg    PV mean gradient 1 mmHg    RVOT peak delbert 0.68 m/s    Ao root annulus 3.52 cm    STJ 3.68 cm    Ascending aorta 3.60 cm    IVC diameter 1.48 cm    Mean e' 0.09 m/s    ZLVIDS -2.11     ZLVIDD -4.27     LA Volume Index 43.4 mL/m2    LA volume 91.92 cm3    LA WIDTH 4.5 cm    RA Width 4.1 cm    TV resting pulmonary artery pressure 33 mmHg    RV TB RVSP 6 mmHg    Est. RA pres 3 mmHg    Narrative      Left Ventricle: The left ventricle is normal in size. Increased wall   thickness. There is moderate asymmetric hypertrophy. Septal motion is   consistent with post-operative status. There is normal systolic function   with a visually estimated ejection fraction of 55 - 60%. Grade II   diastolic dysfunction.    Right Ventricle: Mild right ventricular enlargement. Wall thickness is   normal. Systolic function is mildly reduced.    Left Atrium: Left atrium is moderately dilated.    Aortic Valve: There is severe aortic valve sclerosis. Moderately   restricted motion. There is moderate stenosis. Aortic valve area by VTI is   1.18 cm². Aortic valve peak velocity is 2.84 m/s. Mean gradient is 20   mmHg. The dimensionless index is 0.36. There is mild aortic regurgitation.    Mitral Valve: There is mild regurgitation.    Tricuspid Valve: There is moderate regurgitation.    Pulmonary Artery: The estimated pulmonary artery systolic pressure is   33 mmHg.    IVC/SVC: Normal venous pressure at 3 mmHg.     , EKG: Reviewed, and X-Ray: CXR: X-Ray Chest 1 View (CXR): No results found for this visit on 08/26/24. and X-Ray Chest PA and Lateral (CXR): No results found for this visit on  08/26/24.  Assessment and Plan:   Patient with history of CABG who presents with CP, concerning for angina. Initial troponin negative, will continue to trend. Add Imdur and amlodipine and assess response. Repeat limited TTE. Keep NPO after MN.    * Chest pain  -Patient with history of CABG who presents with exertional CP, concerning for angina  -Reported relief with morphine given in ED  -Initial troponin negative, continue to trend  -Continue ASA, BB, ACEi, Ranexa  -Consider addition of nitrates and amlodipine  -Recent MPI stress test from 6/24 negative  -TTE 6/24 with normal EF, mod AS, repeat limited   -Will consider [facility] tm for definitive evaluation, keep NPO after MN    Paroxysmal atrial fibrillation  -Currently in SR, s/p PVI June 2024 by Dr. Huerta  -EKG reviewed, prolonged 1st degree AV block  -Coreg dosage adjusted to 3.125 mg BID  -Continue same dose of Rythmol for now  -EP asked to review  -Heparin gtt for AC    Mixed hyperlipidemia  -Repatha as OP    Nonrheumatic aortic valve stenosis  -Moderate by last TTE    PVD (peripheral vascular disease)  -ASA  -On Repatha as OP    Essential hypertension  -Continue home meds as tolerated  -Consider amlodipine  -Coreg dosage adjusted given EKG findings        VTE Risk Mitigation (From admission, onward)           Ordered     heparin 25,000 units in dextrose 5% (100 units/ml) IV bolus from bag LOW INTENSITY nomogram - [facility]  As needed (PRN)        Question:  Heparin Infusion Adjustment (DO NOT MODIFY ANSWER)  Answer:  \[email or web address]\epic\Images\Pharmacy\HeparinInfusions\heparin LOW INTENSITY nomogram for [facility] [id number].pdf    08/26/24 1347     heparin 25,000 units in dextrose 5% (100 units/ml) IV bolus from bag LOW INTENSITY nomogram - [facility]  As needed (PRN)        Question:  Heparin Infusion Adjustment (DO NOT MODIFY ANSWER)  Answer:  \\[email or web address]\epic\Images\Pharmacy\HeparinInfusions\heparin LOW INTENSITY nomogram for [facility] [id number]    08/26/24 1347     IP VTE HIGH RISK  PATIENT  Once         08/26/24 1402     Place sequential compression device  Until discontinued         08/26/24 1402     Reason for No Pharmacological VTE Prophylaxis  Once        Question:  Reasons:  Answer:  Physician Provided (leave comment)  Comment:  on heparin gtt    08/26/24 1402     heparin 25,000 units in dextrose 5% (100 units/ml) IV bolus from bag LOW INTENSITY nomogram - [facility]  Once        Question:  Heparin Infusion Adjustment (DO NOT MODIFY ANSWER)  Answer:  \\[email or web address]\epic\Images\Pharmacy\HeparinInfusions\heparin LOW INTENSITY nomogram for [facility] [id number]    08/26/24 1347     heparin 25,000 units in dextrose 5% 250 mL (100 units/mL) infusion LOW INTENSITY nomogram - [facility]  Continuous        Question:  Begin at (units/kg/hr)  Answer:  12    08/26/24 1340                    Thank you for your consult. I will follow-up with patient. Please contact us if you have any additional questions.    Aster Petty PA-C  Cardiology   [facility] - Emergency Dept.

## 2024-08-26 NOTE — PHARMACY MED REC
"Admission Medication History     The home medication history was taken by Rosie Howell.    You may go to "Admission" then "Reconcile Home Medications" tabs to review and/or act upon these items.     The home medication list has been updated by the Pharmacy department.   Please read ALL comments highlighted in yellow.   Please address this information as you see fit.    Feel free to contact us if you have any questions or require assistance.          Medications listed below were obtained from: Patient/family and Analytic software- [employer]      LAST [facility] REC COMPLETED:   oRsie Howell  [id number]    Current Outpatient Medications on File Prior to Encounter   Medication Sig Dispense Refill Last Dose    ACCU-CHEK GRACE PLUS METER Misc AS DIRECTED 1 each 0 Past Week    albuterol (PROVENTIL HFA) 90 mcg/actuation inhaler Inhale 2 puffs into the lungs every 6 (six) hours as needed for Wheezing. Rescue   Past Month    aspirin (ECOTRIN) 81 MG EC tablet Take 1 tablet (81 mg total) by mouth once daily. 90 tablet 3 8/26/2024    blood sugar diagnostic (ACCU-CHEK GRACE PLUS TEST STRP) Strp TEST THREE TIMES A  strip 11 Past Week    budesonide-formoterol 160-4.5 mcg (SYMBICORT) 160-4.5 mcg/actuation HFAA INHALE 2 PUFFS INTO THE LUNGS TWO TIMES A DAY. (Patient taking differently: Inhale 2 puffs into the lungs every 12 (twelve) hours. INHALE 2 PUFFS INTO THE LUNGS TWO TIMES A DAY.) 10.2 g 11 8/26/2024    carvediloL (COREG) 12.5 MG tablet Take 2 tablets (25 mg total) by mouth 2 (two) times daily with meals. 360 tablet 3 8/26/2024    empagliflozin (JARDIANCE) 25 mg tablet Take 1 tablet (25 mg total) by mouth once daily. 90 tablet 3 8/26/2024    famotidine (PEPCID) 40 MG tablet Take 40 mg by mouth once daily.   8/26/2024    finasteride (PROSCAR) 5 mg tablet TAKE 1 TABLET BY MOUTH once daily 90 tablet 3 8/26/2024    furosemide (LASIX) 40 MG tablet Take 1 tablet (40 mg total) by mouth 2 (two) times daily. 60 tablet 11 " "8/26/2024    GLUCOSAMINE HCL/CHONDR ROSARIO A NA (OSTEO BI-FLEX ORAL) Take 1 tablet by mouth 2 (two) times daily.    8/26/2024    krill oil 500 mg Cap Take 1 capsule by mouth Daily.   8/26/2024    lancets (ACCU-CHEK FASTCLIX LANCET DRUM) Misc TEST TWO TIMES A  each 5 Past Week    LANTUS SOLOSTAR U-100 INSULIN 100 unit/mL (3 mL) InPn pen INJECT 44 UNITS UNDER THE SKIN EVERY EVENING 45 mL 0 8/26/2024    levothyroxine (SYNTHROID) 300 MCG Tab Take 1 tablet (300 mcg total) by mouth every morning. 90 tablet 3 8/26/2024    lisinopriL 10 MG tablet Take 1 tablet by mouth once daily 90 tablet 3 8/26/2024    metFORMIN (GLUCOPHAGE-XR) 500 MG ER 24hr tablet Take 1 tablet (500 mg total) by mouth 2 (two) times daily with meals. 180 tablet 3 8/26/2024    mirabegron (MYRBETRIQ) 25 mg Tb24 ER tablet Take 1 tablet (25 mg total) by mouth once daily. 30 tablet 11 8/26/2024    montelukast (SINGULAIR) 10 mg tablet Take 1 tablet (10 mg total) by mouth once daily. 90 tablet 3 8/25/2024    pen needle, diabetic (BD ULTRA-FINE MINI PEN NEEDLE) 31 gauge x 3/16" Ndle USE AS DIRECTED 100 each 11 Past Week    RABEprazole (ACIPHEX) 20 mg tablet Take 1 tablet (20 mg total) by mouth 2 (two) times daily. 180 tablet 3 8/26/2024    ranolazine (RANEXA) 1,000 mg Tb12 Take 1 tablet (1,000 mg total) by mouth 2 (two) times daily. 60 tablet 2 8/26/2024    REPATHA SURECLICK 140 mg/mL PnIj INJECT 140mg subcutaneously every 14 days (Patient taking differently: Inject 140 mg into the skin every 14 (fourteen) days.) 2 mL 11 Past Month    rivaroxaban (XARELTO) 20 mg Tab Take 20 mg by mouth daily with dinner or evening meal.   8/25/2024    inclisiran (LEQVIO) 284 mg/1.5 mL Syrg subcutaneous injection Inject 1.5 mLs (284 mg total) into the skin every 3 (three) months. 1.5 mL 1 Unknown    inclisiran (LEQVIO) 284 mg/1.5 mL Syrg subcutaneous injection Inject 1.5 mLs (284 mg total) into the skin every 6 (six) months. 1.5 mL 1 Unknown    propafenone (RHTHYMOL) 150 MG " Tab Take 1 tablet (150 mg total) by mouth every 8 (eight) hours. 90 tablet 11     sildenafiL (VIAGRA) 100 MG tablet Take 1/2 to 1 tablet by mouth one hour prior to intercourse. Max 100mg per day 30 tablet 11 Unknown                           .

## 2024-08-26 NOTE — Clinical Note
The catheter was repositioned into the ostium   right coronary artery. The catheter was unable to engage the area..

## 2024-08-26 NOTE — SUBJECTIVE & OBJECTIVE
Past Medical History:   Diagnosis Date    Anticoagulant long-term use     Aortic stenosis     Asthma     Benign prostatic hyperplasia with nocturia     Bilateral carotid artery stenosis 07/21/2021    US CAROTID BILATERAL 07/14/2021 IMPRESSION: Atherosclerosis with suspected stenosis greater than 50% at the right proximal ICA visually. Velocities are discordant and appear improved from prior. Atherosclerosis on the left with no evidence of flow-limiting stenosis greater than 50%.  FINDINGS: Right: Internal Carotid Artery (ICA): Peak systolic velocity 118 cm/sec. 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 09/22/2020    DELONTE on CPAP     Osteoarthritis     Paroxysmal atrial fibrillation     PVD (peripheral vascular disease)     Secondary esophageal varices without bleeding 06/26/2017    Type 2 diabetes mellitus        Past Surgical History:   Procedure Laterality Date    ABLATION OF ARRHYTHMOGENIC FOCUS FOR ATRIAL FIBRILLATION N/A 6/24/2024    Procedure: Ablation atrial fibrillation;  Surgeon: Horacio Huerta MD;  Location: [facility] EP LAB;  Service: Cardiology;  Laterality: N/A;  afib, PVI, RFA, BERNARD (cx if SR), ZIA, anes, MB, 3 Prep, 3 hours per Dr. Huerta    ANGIOGRAM, EXTREMITY, UNILATERAL Left 1/4/2024    Procedure: ANGIOGRAM, EXTREMITY, UNILATERAL- Femoral / SMS Stent, Poss General;  Surgeon: ALEX Alfred III, MD;  Location: [facility] OR [address] [place] [address];  Service: Vascular;  Laterality: Left;  mGy : 2698.78  Gy.cm : 229.88  Contrast :  62ml  Fluro time : 13.8min    CARDIAC CATHETERIZATION      CARDIAC SURGERY      CARPAL TUNNEL RELEASE Left     CATARACT EXTRACTION      CATARACT EXTRACTION W/ INTRAOCULAR LENS  IMPLANT, BILATERAL  2008    CATHETERIZATION OF BOTH LEFT AND RIGHT HEART N/A 11/2/2018    Procedure: CATHETERIZATION, HEART, BOTH LEFT AND RIGHT;  Surgeon: Frank Gallardo MD;  Location: [facility] CATH LAB;  Service: Cardiology;  Laterality: N/A;    COLONOSCOPY  8/2013    COLONOSCOPY N/A 5/30/2016    Procedure: COLONOSCOPY;  Surgeon: Anna Tomas MD;  Location: [facility] ENDO;  Service: Endoscopy;  Laterality: N/A;    COLONOSCOPY N/A 7/17/2019    Procedure: COLONOSCOPY;  Surgeon: David Carter III, MD;  Location: [facility] ENDO;  Service: Endoscopy;  Laterality: N/A;    COLONOSCOPY N/A 12/14/2023    Procedure: COLONOSCOPY;  Surgeon: Ama Barrera MD;  Location: [facility] ENDO;  Service: Endoscopy;  Laterality: N/A;    COLONOSCOPY N/A 7/27/2024    Procedure: COLONOSCOPY;  Surgeon: Ama Barrera MD;  Location: [facility] ENDO;  Service: Endoscopy;  Laterality: N/A;    COMPUTED TOMOGRAPHY N/A 9/9/2019    Procedure: CT (COMPUTED TOMOGRAPHY);  Surgeon: [facility] Diagnostic Provider;  Location: [facility] OR;  Service: General;  Laterality: N/A;  Microwave ablation to be done in CT.  Need CRNA and cart    COMPUTED TOMOGRAPHY N/A 1/25/2022    Procedure: Liver Microwave Ablation;  Surgeon: Red Puentes MD;  Location: [facility] ER;  Service: General;  Laterality: N/A;    CORONARY ARTERY BYPASS GRAFT (CABG) N/A 11/5/2018    Procedure: CORONARY ARTERY BYPASS GRAFT (CABG);  Surgeon: Bear De Luna MD;  Location: [facility] OR;  Service: Cardiothoracic;  Laterality: N/A;  TWO VESSEL BYPASS WITH AORTOTOMY AND EXCISION OF ATHEROSCLEROTIC PLAQUE    CORONARY BYPASS GRAFT ANGIOGRAPHY  3/2/2020    Procedure: Bypass graft study;  Surgeon: Cora Cormier MD;  Location: [facility] CATH LAB;  Service: Cardiology;;    ECHOCARDIOGRAM,TRANSESOPHAGEAL N/A 6/24/2024    Procedure: Transesophageal  echo (BERNARD) intra-procedure log documentation;  Surgeon: Nacho Downs MD;  Location: [facility] EP LAB;  Service: Cardiology;  Laterality: N/A;    ELBOW BURSA SURGERY Right 2010    ENDOSCOPIC HARVEST OF VEIN Left 11/5/2018    Procedure: SURGICAL PROCUREMENT, VEIN, ENDOSCOPIC;  Surgeon: Bear De Luna MD;  Location: [facility];  Service: Cardiothoracic;  Laterality: Left;    ESOPHAGOGASTRODUODENOSCOPY N/A 7/17/2019    Procedure: ESOPHAGOGASTRODUODENOSCOPY (EGD);  Surgeon: David Carter III, MD;  Location: [facility];  Service: Endoscopy;  Laterality: N/A;    ESOPHAGOGASTRODUODENOSCOPY N/A 12/29/2022    Procedure: ESOPHAGOGASTRODUODENOSCOPY (EGD);  Surgeon: Issa Gayle MD;  Location: [facility];  Service: Endoscopy;  Laterality: N/A;    ESOPHAGOGASTRODUODENOSCOPY N/A 1/17/2024    Procedure: EGD (ESOPHAGOGASTRODUODENOSCOPY);  Surgeon: Issa Gayle MD;  Location: [facility];  Service: Endoscopy;  Laterality: N/A;    ESOPHAGOGASTRODUODENOSCOPY N/A 4/30/2024    Procedure: EGD (ESOPHAGOGASTRODUODENOSCOPY);  Surgeon: Eder Foley MD;  Location: [facility];  Service: Gastroenterology;  Laterality: N/A;    ESOPHAGOGASTRODUODENOSCOPY N/A 7/27/2024    Procedure: EGD (ESOPHAGOGASTRODUODENOSCOPY);  Surgeon: Ama Barrera MD;  Location: [facility];  Service: Endoscopy;  Laterality: N/A;    ETHMOIDECTOMY Bilateral 3/27/2019    Procedure: ETHMOIDECTOMY;  Surgeon: Alejandro Parkinson MD;  Location: [facility];  Service: ENT;  Laterality: Bilateral;    EYE SURGERY      FOOT SURGERY      FRONTAL SINUS OBLITERATION Bilateral 3/27/2019    Procedure: SINUSOTOMY, FRONTAL SINUS, OBLITERATIVE;  Surgeon: Alejandro Parkinson MD;  Location: [facility];  Service: ENT;  Laterality: Bilateral;    FUNCTIONAL ENDOSCOPIC SINUS SURGERY (FESS) Bilateral 3/27/2019    Procedure: FESS (FUNCTIONAL ENDOSCOPIC SINUS SURGERY);  Surgeon: Alejandro Parkinson MD;  Location: [facility];  Service: ENT;  Laterality: Bilateral;  Left Keila bullosa resection      INTRALUMINAL GASTROINTESTINAL TRACT IMAGING VIA CAPSULE N/A 2/2/2024    Procedure: IMAGING PROCEDURE, GI TRACT, INTRALUMINAL, VIA CAPSULE;  Surgeon: Cooper Estrella RN;  Location: [facility] ENDO;  Service: Endoscopy;  Laterality: N/A;    INTRALUMINAL GASTROINTESTINAL TRACT IMAGING VIA CAPSULE N/A 7/31/2024    Procedure: IMAGING PROCEDURE, GI TRACT, INTRALUMINAL, VIA CAPSULE;  Surgeon: Cooper Estrella RN;  Location: [facility] ENDO;  Service: Endoscopy;  Laterality: N/A;    KNEE ARTHROSCOPY W/ MENISCAL REPAIR Left 06/2019    dr boykin    KNEE SURGERY Right     LEFT HEART CATHETERIZATION Left 3/2/2020    Procedure: CATHETERIZATION, HEART, LEFT;  Surgeon: Cora Cormier MD;  Location: [facility] CATH LAB;  Service: Cardiology;  Laterality: Left;    LIVER BIOPSY  01/2022    MAXILLARY ANTROSTOMY Bilateral 3/27/2019    Procedure: MAXILLARY ANTROSTOMY;  Surgeon: Alejandro Parkinson MD;  Location: [facility] OR;  Service: ENT;  Laterality: Bilateral;    NASAL SEPTOPLASTY N/A 3/27/2019    Procedure: SEPTOPLASTY, NOSE;  Surgeon: Alejandro Parkinson MD;  Location: [facility] OR;  Service: ENT;  Laterality: N/A;    RECTAL SURGERY  2012    STENT, SUPERIOR MESENTERIC ARTERY Left 1/4/2024    Procedure: STENT, SUPERIOR MESENTERIC ARTERY;  Surgeon: ALEX Alfred III, MD;  Location: [address];  Service: Vascular;  Laterality: Left;    TRANSURETHRAL RESECTION OF PROSTATE (TURP) WITHOUT USE OF LASER N/A 6/19/2018    Procedure: TURP, WITHOUT USING LASER;  Surgeon: Cooper Cordova IV, MD;  Location: [facility] OR;  Service: Urology;  Laterality: N/A;    TRIGGER FINGER RELEASE Left        Review of patient's allergies indicates:   Allergen Reactions    Lipitor [atorvastatin] Other (See Comments)     Muscle aches and pains as well as fatigue.     Niacin preparations Other (See Comments)     Causes broken blood vessels and bruises at 4 times normal dose.    Statins-hmg-coa reductase inhibitors Other (See Comments)     Statins cause intolerable myalgias.    Iodinated contrast media  Hives     Tachycardia    Omeprazole Hives and Itching    Prilosec [omeprazole magnesium] Hives and Itching       Current Facility-Administered Medications on File Prior to Encounter   Medication    lactated ringers infusion     Current Outpatient Medications on File Prior to Encounter   Medication Sig    ACCU-CHEK GRACE PLUS METER Misc AS DIRECTED    albuterol (PROVENTIL HFA) 90 mcg/actuation inhaler Inhale 2 puffs into the lungs every 6 (six) hours as needed for Wheezing. Rescue    aspirin (ECOTRIN) 81 MG EC tablet Take 1 tablet (81 mg total) by mouth once daily.    blood sugar diagnostic (ACCU-CHEK GRACE PLUS TEST STRP) Strp TEST THREE TIMES A DAY    budesonide-formoterol 160-4.5 mcg (SYMBICORT) 160-4.5 mcg/actuation HFAA INHALE 2 PUFFS INTO THE LUNGS TWO TIMES A DAY. (Patient taking differently: Inhale 2 puffs into the lungs every 12 (twelve) hours. INHALE 2 PUFFS INTO THE LUNGS TWO TIMES A DAY.)    carvediloL (COREG) 12.5 MG tablet Take 2 tablets (25 mg total) by mouth 2 (two) times daily with meals.    empagliflozin (JARDIANCE) 25 mg tablet Take 1 tablet (25 mg total) by mouth once daily.    famotidine (PEPCID) 40 MG tablet Take 40 mg by mouth once daily.    finasteride (PROSCAR) 5 mg tablet TAKE 1 TABLET BY MOUTH once daily    furosemide (LASIX) 40 MG tablet Take 1 tablet (40 mg total) by mouth 2 (two) times daily.    GLUCOSAMINE HCL/CHONDR ROSARIO A NA (OSTEO BI-FLEX ORAL) Take 1 tablet by mouth 2 (two) times daily.     krill oil 500 mg Cap Take 1 capsule by mouth Daily.    lancets (ACCU-CHEK FASTCLIX LANCET DRUM) Misc TEST TWO TIMES A DAY    LANTUS SOLOSTAR U-100 INSULIN 100 unit/mL (3 mL) InPn pen INJECT 44 UNITS UNDER THE SKIN EVERY EVENING    levothyroxine (SYNTHROID) 300 MCG Tab Take 1 tablet (300 mcg total) by mouth every morning.    lisinopriL 10 MG tablet Take 1 tablet by mouth once daily    metFORMIN (GLUCOPHAGE-XR) 500 MG ER 24hr tablet Take 1 tablet (500 mg total) by mouth 2 (two) times daily with  "meals.    mirabegron (MYRBETRIQ) 25 mg Tb24 ER tablet Take 1 tablet (25 mg total) by mouth once daily.    montelukast (SINGULAIR) 10 mg tablet Take 1 tablet (10 mg total) by mouth once daily.    pen needle, diabetic (BD ULTRA-FINE MINI PEN NEEDLE) 31 gauge x 3/16" Ndle USE AS DIRECTED    RABEprazole (ACIPHEX) 20 mg tablet Take 1 tablet (20 mg total) by mouth 2 (two) times daily.    ranolazine (RANEXA) 1,000 mg Tb12 Take 1 tablet (1,000 mg total) by mouth 2 (two) times daily.    REPATHA SURECLICK 140 mg/mL PnIj INJECT 140mg subcutaneously every 14 days (Patient taking differently: Inject 140 mg into the skin every 14 (fourteen) days.)    rivaroxaban (XARELTO) 20 mg Tab Take 20 mg by mouth daily with dinner or evening meal.    inclisiran (LEQVIO) 284 mg/1.5 mL Syrg subcutaneous injection Inject 1.5 mLs (284 mg total) into the skin every 3 (three) months.    inclisiran (LEQVIO) 284 mg/1.5 mL Syrg subcutaneous injection Inject 1.5 mLs (284 mg total) into the skin every 6 (six) months.    propafenone (RHTHYMOL) 150 MG Tab Take 1 tablet (150 mg total) by mouth every 8 (eight) hours.    sildenafiL (VIAGRA) 100 MG tablet Take 1/2 to 1 tablet by mouth one hour prior to intercourse. Max 100mg per day     Family History       Problem Relation (Age of Onset)    Heart disease Mother, Father, Brother          Tobacco Use    Smoking status: Former     Current packs/day: 0.00     Average packs/day: 0.5 packs/day for 4.0 years (2.0 ttl pk-yrs)     Types: Cigarettes     Start date: 1968     Quit date: 1972     Years since quittin.2    Smokeless tobacco: Former     Types: Chew     Quit date: 1976   Substance and Sexual Activity    Alcohol use: Not Currently    Drug use: No    Sexual activity: Yes     Partners: Female     Review of Systems   Constitutional: Negative.   HENT: Negative.     Eyes: Negative.    Cardiovascular:  Positive for chest pain.   Respiratory: Negative.     Endocrine: Negative.  "   Hematologic/Lymphatic: Negative.    Skin: Negative.    Musculoskeletal:  Positive for arthritis and joint pain.   Gastrointestinal: Negative.    Genitourinary: Negative.    Neurological: Negative.    Psychiatric/Behavioral: Negative.     Allergic/Immunologic: Negative.      Objective:     Vital Signs (Most Recent):  Temp: 97.9 °F (36.6 °C) (08/26/24 1048)  Pulse: 70 (08/26/24 1318)  Resp: 18 (08/26/24 1318)  BP: (!) 184/91 (08/26/24 1318)  SpO2: 100 % (08/26/24 1318) Vital Signs (24h Range):  Temp:  [97.9 °F (36.6 °C)] 97.9 °F (36.6 °C)  Pulse:  [69-74] 70  Resp:  [17-19] 18  SpO2:  [96 %-100 %] 100 %  BP: (130-184)/(60-91) 184/91     Weight: 102.7 kg (226 lb 6.6 oz)  Body mass index is 35.46 kg/m².    SpO2: 100 %       No intake or output data in the 24 hours ending 08/26/24 1430    Lines/Drains/Airways       Peripheral Intravenous Line  Duration                  Peripheral IV - Single Lumen 08/26/24 1129 20 G Left Antecubital <1 day                     Physical Exam  Vitals and nursing note reviewed.   Constitutional:       General: He is not in acute distress.     Appearance: Normal appearance. He is well-developed. He is not diaphoretic.   HENT:      Head: Normocephalic and atraumatic.   Eyes:      General:         Right eye: No discharge.         Left eye: No discharge.      Pupils: Pupils are equal, round, and reactive to light.   Cardiovascular:      Rate and Rhythm: Normal rate and regular rhythm. Extrasystoles are present.     Heart sounds: Normal heart sounds, S1 normal and S2 normal. No murmur heard.     Comments: Sternotomy site well-healed  Pulmonary:      Effort: Pulmonary effort is normal. No respiratory distress.      Breath sounds: Normal breath sounds.   Abdominal:      General: There is no distension.   Musculoskeletal:      Right lower leg: No edema.      Left lower leg: No edema.   Skin:     General: Skin is warm and dry.      Findings: No erythema.   Neurological:      Mental Status: He is  alert and oriented to person, place, and time.   Psychiatric:         Mood and Affect: Mood normal.         Behavior: Behavior normal.         Thought Content: Thought content normal.          Significant Labs: CMP   Recent Labs   Lab 08/26/24  1133      K 3.8      CO2 25   *   BUN 22   CREATININE 1.1   CALCIUM 8.5*   PROT 6.5   ALBUMIN 3.4*   BILITOT 0.2   ALKPHOS 65   AST 17   ALT 11   ANIONGAP 10   , CBC   Recent Labs   Lab 08/26/24  1133   WBC 3.88*   HGB 10.4*   HCT 31.6*   *   , Troponin   Recent Labs   Lab 08/26/24  1133   TROPONINI 0.015   , and All pertinent lab results from the last 24 hours have been reviewed.    Significant Imaging: Echocardiogram: Transthoracic echo (TTE) complete (Cupid Only):   Results for orders placed or performed during the hospital encounter of 06/11/24   Echo   Result Value Ref Range    BSA 2.19 m2    LVOT stroke volume 81.02 cm3    LVIDd 4.37 3.5 - 6.0 cm    LV Systolic Volume 38.77 mL    LV Systolic Volume Index 18.3 mL/m2    LVIDs 3.13 2.1 - 4.0 cm    LV Diastolic Volume 86.07 mL    LV Diastolic Volume Index 40.60 mL/m2    IVS 1.45 (A) 0.6 - 1.1 cm    LVOT diameter 2.04 cm    LVOT area 3.3 cm2    FS 28 28 - 44 %    Left Ventricle Relative Wall Thickness 0.65 cm    Posterior Wall 1.42 (A) 0.6 - 1.1 cm    LV mass 246.91 g    LV Mass Index 116 g/m2    MV Peak E Delbert 1.10 m/s    TDI LATERAL 0.11 m/s    TDI SEPTAL 0.07 m/s    E/E' ratio 12.22 m/s    MV Peak A Delbert 1.34 m/s    TR Max Delbert 2.76 m/s    E/A ratio 0.82     IVRT 74.22 msec    E wave deceleration time 246.09 msec    LV SEPTAL E/E' RATIO 15.71 m/s    LV LATERAL E/E' RATIO 10.00 m/s    LVOT peak delbert 0.97 m/s    Left Ventricular Outflow Tract Mean Velocity 0.64 cm/s    Left Ventricular Outflow Tract Mean Gradient 1.94 mmHg    RVOT peak VTI 13.7 cm    TAPSE 1.38 cm    LA size 4.08 cm    Left Atrium Minor Axis 5.61 cm    Left Atrium Major Axis 6.20 cm    RA Major Axis 5.45 cm    AV regurgitation pressure  1/2 time 956.8824104622114 ms    AR Max Delbert 3.80 m/s    AV mean gradient 20 mmHg    AV peak gradient 32 mmHg    Ao peak delbert 2.84 m/s    Ao VTI 68.90 cm    LVOT peak VTI 24.80 cm    AV valve area 1.18 cm²    AV Velocity Ratio 0.34     AV index (prosthetic) 0.36     RADHA by Velocity Ratio 1.12 cm²    Triscuspid Valve Regurgitation Peak Gradient 30 mmHg    PV mean gradient 1 mmHg    RVOT peak delbert 0.68 m/s    Ao root annulus 3.52 cm    STJ 3.68 cm    Ascending aorta 3.60 cm    IVC diameter 1.48 cm    Mean e' 0.09 m/s    ZLVIDS -2.11     ZLVIDD -4.27     LA Volume Index 43.4 mL/m2    LA volume 91.92 cm3    LA WIDTH 4.5 cm    RA Width 4.1 cm    TV resting pulmonary artery pressure 33 mmHg    RV TB RVSP 6 mmHg    Est. RA pres 3 mmHg    Narrative      Left Ventricle: The left ventricle is normal in size. Increased wall   thickness. There is moderate asymmetric hypertrophy. Septal motion is   consistent with post-operative status. There is normal systolic function   with a visually estimated ejection fraction of 55 - 60%. Grade II   diastolic dysfunction.    Right Ventricle: Mild right ventricular enlargement. Wall thickness is   normal. Systolic function is mildly reduced.    Left Atrium: Left atrium is moderately dilated.    Aortic Valve: There is severe aortic valve sclerosis. Moderately   restricted motion. There is moderate stenosis. Aortic valve area by VTI is   1.18 cm². Aortic valve peak velocity is 2.84 m/s. Mean gradient is 20   mmHg. The dimensionless index is 0.36. There is mild aortic regurgitation.    Mitral Valve: There is mild regurgitation.    Tricuspid Valve: There is moderate regurgitation.    Pulmonary Artery: The estimated pulmonary artery systolic pressure is   33 mmHg.    IVC/SVC: Normal venous pressure at 3 mmHg.     , EKG: Reviewed, and X-Ray: CXR: X-Ray Chest 1 View (CXR): No results found for this visit on 08/26/24. and X-Ray Chest PA and Lateral (CXR): No results found for this visit on 08/26/24.

## 2024-08-26 NOTE — ASSESSMENT & PLAN NOTE
-Currently in SR, s/p PVI June 2024 by Dr. Huerta  -EKG reviewed, prolonged 1st degree AV block  -Coreg dosage adjusted to 3.125 mg BID  -Continue same dose of Rythmol for now  -EP asked to review  -Heparin gtt for AC

## 2024-08-26 NOTE — HPI
Mr. Pretty is a 79 year old male patient whose current medical conditions include CAD s/p CABG, PVD, mesenteric ischemia s/p SMA stent, claudication, asthma, prior GIB and history of angioectasias, anemia, cirrhosis, AS, COPD, BPH, and PAF (on Xarelto) s/p recent PVI in 6/24 who presented to [facility] ED today due to intermittent substernal chest pain over the past week. Patient described the pain as pressure/tightness and noticed it more so with exertion/ambulation. He denied any radiation of pain or any associated fever, chills, SOB, palpitations, near syncope, or syncope. He initially felt symptoms were due to GERD but became concerned when the pain persisted today. Initial workup in ED revealed negative troponin, BNP of 260, and anemia (H/H 10.4/31.6) and patient was subsequently admitted for further evaluation/observation. Cardiology consulted to assist with management. Patient seen and examined in ED. Currently CP free after receiving morphine. He reports compliance with his medications. Followed on OP basis by Dr. Gallardo. Recently had negative MPI stress test in 6/24. Scheduled to undergo Watchman procedure soon by EP, Dr. Huerta. Followed by GI given history of melena/GIB, recently underwent video endoscopy with no active bleeding noted. TTE from 6/24 showed normal EF, mod AS. Repeat troponin pending.

## 2024-08-26 NOTE — HPI
Mr. Erendira Pretty is a 79 year old male with a past medical history of aortic stenosis, BPH, bilateral carotid artery stenosis, HLD, HTN, PAF on xarelto and ASA, PVD, asthma, cirrhosis, COPD, Hep C, hypothyroidism, DELONTE on CPAP, OA, Type 2 Diabetes Mellitus who presented to the ER with complaints of worsening CP. Patient reports over the past 2-3 days, states it is made worse with exertion. States the pain comes and goes, reports it has worsened in severity. Does endorse mild BLE swelling. Denies any SOB, nausea, vomiting, abd pain, or dysuria. In ER: H/H 10.9/33.6, PLT count 151, PT 12, INR 1.0, Sodium 141, Potassium 3.8, BUN 22, Crit 1.1, , Trop 0.015, EKG: First degree AV block, Premature atrial complexes, Sinus rhythm. Echo ordered, results pending. VS HR 70, /91, Spo2 100% on room air, Temp 97.9, RR 20. Started on heparin gtt, Cardiology consulted and [facility] consulted for admission.    Problem: Patient Care Overview  Goal: Plan of Care/Patient Progress Review  OT/CR: eval completed and treatment initiated. Pt lives in a house with his wife and family. Pt works in commercial real estate full time. Admitted due to NSTEMI, s/p angio and HONEY to OM1.   Discharge Planner OT   Patient plan for discharge: home  Current status: pt ambulated approx 70 ft x 2, TDM gradual increase to 1.9 mph for 17.5 mins and 25 stairs, pt denies any symptoms and stable CV response, see vital sign flow sheet for details. Pt educated in CAD risk factors with focus on diet and exercise and motivated for OP CR at [facility].   Barriers to return to prior living situation: none with family A with IADL's ie snowblowing, driving per MD and OP CR at [facility].   Recommendations for discharge: family A with IADl's as above and OP CR at [facility].  Rationale for recommendations: OP CR for monitored progressive exercise and risk factor education and modification.        Entered by: Alejandra Arceo 02/13/2018 11:46 AM         Occupational Therapy Discharge Summary    Reason for therapy discharge:    Discharged to home with outpatient therapy.    Progress towards therapy goal(s). See goals on Care Plan in [facility] electronic health record for goal details.  Goals partially met.  Barriers to achieving goals:   discharge from facility.    Therapy recommendation(s):    Continued therapy is recommended.  Rationale/Recommendations:  home with family A with IADL's ie driving, snowblowing etc and OP CR at [facility] for monitored progressive exercise and riskf actor educationand modificaiton. .

## 2024-08-26 NOTE — ASSESSMENT & PLAN NOTE
-Patient presented with worsening CP over the past 2-3 days   -Patient with history of CABG who presents with exertional CP, concerning for angina  -Reported relief with IV morphine given in ED  -Initial troponin negative, continue to trend  -Will continue Beta blocker, ACE inhibitor and Ranexa   -Tele monitoring   -Cardiology consulted, Plan for [facility] tomorrow   -Patient NPO at midnight  -EKG: First degree AV block, Premature atrial complexes, Sinus rhythm   -ECHO ordered, results pending   -Heparin gtt

## 2024-08-26 NOTE — ED PROVIDER NOTES
SCRIBE #1 NOTE: I, Nael Kerr, am scribing for, and in the presence of, Daryl Arevalo Jr., MD. I have scribed the entire note.       History     Chief Complaint   Patient presents with    Chest Pain     Pt. Reports chest pain for several days. Hx of A-fib     Review of patient's allergies indicates:   Allergen Reactions    Lipitor [atorvastatin] Other (See Comments)     Muscle aches and pains as well as fatigue.     Niacin preparations Other (See Comments)     Causes broken blood vessels and bruises at 4 times normal dose.    Statins-hmg-coa reductase inhibitors Other (See Comments)     Statins cause intolerable myalgias.    Iodinated contrast media Hives     Tachycardia    Omeprazole Hives and Itching    Prilosec [omeprazole magnesium] Hives and Itching         History of Present Illness     HPI    8/26/2024, 11:43 AM  History obtained from the patient      History of Present Illness: Erendira Pretty is a 79 y.o. male patient with a PMHx of DM Type 2, Hepatitis C, HTN, HLD, hypothyroid, osteoarthritis , CAD, PVD, claudication, BPH, asthma, colon polyp, cirrhosis, aortic stenosis, blood transfusion, COPD, benign prostatic hyperplasia with nocturia, paroxysmal atrial fibrillation, and bilateral carotid artery stenosis who presents to the Emergency Department for evaluation of CP which onset gradually the past week. Pt states the sxs has been intermittent but has been more constant today. Pt notes the sxs are worse with ambulation. Associated sxs include abdominal pain. Patient denies any chills, SOB, dysuria, back pain, and all other sxs at this time. No prior Tx reported. Pt has a hx of a-fib. Pt had an open heart surgery before. Pt denies smoking cigarettes. No further complaints or concerns at this time.  Patient was states the pain is similar to his acid reflux however he repetitively states that this happens with exertion.      Arrival mode: Personal vehicle    PCP: No, Primary Doctor        Past Medical  History:  Past Medical History:   Diagnosis Date    Anticoagulant long-term use     Aortic stenosis     Asthma     Benign prostatic hyperplasia with nocturia     Bilateral carotid artery stenosis 07/21/2021    US CAROTID BILATERAL 07/14/2021 IMPRESSION: Atherosclerosis with suspected stenosis greater than 50% at the right proximal ICA visually. Velocities are discordant and appear improved from prior. Atherosclerosis on the left with no evidence of flow-limiting stenosis greater than 50%.  FINDINGS: Right: Internal Carotid Artery (ICA): Peak systolic velocity 118 cm/sec. 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 09/22/2020    DELONTE on CPAP     Osteoarthritis     Paroxysmal atrial fibrillation     PVD (peripheral vascular disease)     Secondary esophageal varices without bleeding 06/26/2017    Type 2 diabetes mellitus        Past Surgical History:  Past Surgical History:   Procedure Laterality Date    ABLATION OF ARRHYTHMOGENIC FOCUS FOR ATRIAL FIBRILLATION N/A 6/24/2024    Procedure: Ablation atrial fibrillation;  Surgeon: Horacio Huerta MD;  Location: [facility] EP LAB;  Service: Cardiology;  Laterality: N/A;  afib, PVI, RFA, BERNARD (cx if SR), ZIA, anes, MB, 3 Prep, 3 hours per Dr. Huerta    ANGIOGRAM, EXTREMITY, UNILATERAL Left 1/4/2024    Procedure: ANGIOGRAM, EXTREMITY, UNILATERAL- Femoral / SMS Stent, Poss General;  Surgeon: ALEX Alfred III, MD;  Location: [facility] OR [address] [place] [address];  Service: Vascular;  Laterality: Left;  mGy :  2698.78  Gy.cm : 229.88  Contrast : 62ml  Fluro time : 13.8min    CARDIAC CATHETERIZATION      CARDIAC SURGERY      CARPAL TUNNEL RELEASE Left     CATARACT EXTRACTION      CATARACT EXTRACTION W/ INTRAOCULAR LENS  IMPLANT, BILATERAL  2008    CATHETERIZATION OF BOTH LEFT AND RIGHT HEART N/A 11/2/2018    Procedure: CATHETERIZATION, HEART, BOTH LEFT AND RIGHT;  Surgeon: Frank Gallardo MD;  Location: [facility] CATH LAB;  Service: Cardiology;  Laterality: N/A;    COLONOSCOPY  8/2013    COLONOSCOPY N/A 5/30/2016    Procedure: COLONOSCOPY;  Surgeon: Anna Tomas MD;  Location: [facility] ENDO;  Service: Endoscopy;  Laterality: N/A;    COLONOSCOPY N/A 7/17/2019    Procedure: COLONOSCOPY;  Surgeon: David Carter III, MD;  Location: [facility] ENDO;  Service: Endoscopy;  Laterality: N/A;    COLONOSCOPY N/A 12/14/2023    Procedure: COLONOSCOPY;  Surgeon: Ama Barrera MD;  Location: [facility] ENDO;  Service: Endoscopy;  Laterality: N/A;    COLONOSCOPY N/A 7/27/2024    Procedure: COLONOSCOPY;  Surgeon: Ama Barrera MD;  Location: [facility] ENDO;  Service: Endoscopy;  Laterality: N/A;    COMPUTED TOMOGRAPHY N/A 9/9/2019    Procedure: CT (COMPUTED TOMOGRAPHY);  Surgeon: [facility] Diagnostic Provider;  Location: [facility] OR;  Service: General;  Laterality: N/A;  Microwave ablation to be done in CT.  Need CRNA and cart    COMPUTED TOMOGRAPHY N/A 1/25/2022    Procedure: Liver Microwave Ablation;  Surgeon: Red Puentes MD;  Location: [facility];  Service: General;  Laterality: N/A;    CORONARY ARTERY BYPASS GRAFT (CABG) N/A 11/5/2018    Procedure: CORONARY ARTERY BYPASS GRAFT (CABG);  Surgeon: Bear De Luna MD;  Location: [facility] OR;  Service: Cardiothoracic;  Laterality: N/A;  TWO VESSEL BYPASS WITH AORTOTOMY AND EXCISION OF ATHEROSCLEROTIC PLAQUE    CORONARY BYPASS GRAFT ANGIOGRAPHY  3/2/2020    Procedure: Bypass graft study;  Surgeon: Cora Cormier MD;  Location: [facility] CATH LAB;  Service: Cardiology;;    ECHOCARDIOGRAM,TRANSESOPHAGEAL N/A  6/24/2024    Procedure: Transesophageal echo (BERNARD) intra-procedure log documentation;  Surgeon: Nacho Downs MD;  Location: [facility] EP LAB;  Service: Cardiology;  Laterality: N/A;    ELBOW BURSA SURGERY Right 2010    ENDOSCOPIC HARVEST OF VEIN Left 11/5/2018    Procedure: SURGICAL PROCUREMENT, VEIN, ENDOSCOPIC;  Surgeon: Bear De Luna MD;  Location: [facility] OR;  Service: Cardiothoracic;  Laterality: Left;    ESOPHAGOGASTRODUODENOSCOPY N/A 7/17/2019    Procedure: ESOPHAGOGASTRODUODENOSCOPY (EGD);  Surgeon: David Carter III, MD;  Location: [facility] ENDO;  Service: Endoscopy;  Laterality: N/A;    ESOPHAGOGASTRODUODENOSCOPY N/A 12/29/2022    Procedure: ESOPHAGOGASTRODUODENOSCOPY (EGD);  Surgeon: Issa Gayle MD;  Location: [facility];  Service: Endoscopy;  Laterality: N/A;    ESOPHAGOGASTRODUODENOSCOPY N/A 1/17/2024    Procedure: EGD (ESOPHAGOGASTRODUODENOSCOPY);  Surgeon: Issa Gayle MD;  Location: [facility];  Service: Endoscopy;  Laterality: N/A;    ESOPHAGOGASTRODUODENOSCOPY N/A 4/30/2024    Procedure: EGD (ESOPHAGOGASTRODUODENOSCOPY);  Surgeon: Eder Foley MD;  Location: [facility];  Service: Gastroenterology;  Laterality: N/A;    ESOPHAGOGASTRODUODENOSCOPY N/A 7/27/2024    Procedure: EGD (ESOPHAGOGASTRODUODENOSCOPY);  Surgeon: Aam Barrera MD;  Location: [facility];  Service: Endoscopy;  Laterality: N/A;    ETHMOIDECTOMY Bilateral 3/27/2019    Procedure: ETHMOIDECTOMY;  Surgeon: Alejandro Parkinson MD;  Location: [facility];  Service: ENT;  Laterality: Bilateral;    EYE SURGERY      FOOT SURGERY      FRONTAL SINUS OBLITERATION Bilateral 3/27/2019    Procedure: SINUSOTOMY, FRONTAL SINUS, OBLITERATIVE;  Surgeon: Alejandro Parkinson MD;  Location: [facility];  Service: ENT;  Laterality: Bilateral;    FUNCTIONAL ENDOSCOPIC SINUS SURGERY (FESS) Bilateral 3/27/2019    Procedure: FESS (FUNCTIONAL ENDOSCOPIC SINUS SURGERY);  Surgeon: Alejandro Parkinson MD;  Location: [facility];  Service: ENT;  Laterality: Bilateral;   Left Keila bullosa resection     INTRALUMINAL GASTROINTESTINAL TRACT IMAGING VIA CAPSULE N/A 2/2/2024    Procedure: IMAGING PROCEDURE, GI TRACT, INTRALUMINAL, VIA CAPSULE;  Surgeon: Cooper Estrella RN;  Location: [facility] ENDO;  Service: Endoscopy;  Laterality: N/A;    INTRALUMINAL GASTROINTESTINAL TRACT IMAGING VIA CAPSULE N/A 7/31/2024    Procedure: IMAGING PROCEDURE, GI TRACT, INTRALUMINAL, VIA CAPSULE;  Surgeon: Cooper Estrella RN;  Location: [facility] ENDO;  Service: Endoscopy;  Laterality: N/A;    KNEE ARTHROSCOPY W/ MENISCAL REPAIR Left 06/2019    dr boykin    KNEE SURGERY Right     LEFT HEART CATHETERIZATION Left 3/2/2020    Procedure: CATHETERIZATION, HEART, LEFT;  Surgeon: Cora Cormier MD;  Location: [facility] CATH LAB;  Service: Cardiology;  Laterality: Left;    LIVER BIOPSY  01/2022    MAXILLARY ANTROSTOMY Bilateral 3/27/2019    Procedure: MAXILLARY ANTROSTOMY;  Surgeon: Alejandro Parkinson MD;  Location: [facility] OR;  Service: ENT;  Laterality: Bilateral;    NASAL SEPTOPLASTY N/A 3/27/2019    Procedure: SEPTOPLASTY, NOSE;  Surgeon: Alejandro Parkinson MD;  Location: [facility] OR;  Service: ENT;  Laterality: N/A;    RECTAL SURGERY  2012    STENT, SUPERIOR MESENTERIC ARTERY Left 1/4/2024    Procedure: STENT, SUPERIOR MESENTERIC ARTERY;  Surgeon: ALEX Alfred III, MD;  Location: [address];  Service: Vascular;  Laterality: Left;    TRANSURETHRAL RESECTION OF PROSTATE (TURP) WITHOUT USE OF LASER N/A 6/19/2018    Procedure: TURP, WITHOUT USING LASER;  Surgeon: Cooper Cordova IV, MD;  Location: [facility];  Service: Urology;  Laterality: N/A;    TRIGGER FINGER RELEASE Left          Family History:  Family History   Problem Relation Name Age of Onset    Heart disease Mother      Heart disease Father      Heart disease Brother      Colon cancer Neg Hx         Social History:  Social History     Tobacco Use    Smoking status: Former     Current packs/day: 0.00     Average packs/day: 0.5 packs/day for 4.0 years (2.0 ttl pk-yrs)     Types:  Cigarettes     Start date: 1968     Quit date: 1972     Years since quittin.2    Smokeless tobacco: Former     Types: Chew     Quit date: 1976   Substance and Sexual Activity    Alcohol use: Not Currently    Drug use: No    Sexual activity: Yes     Partners: Female        Review of Systems     Review of Systems   Constitutional:  Negative for chills, diaphoresis and fever.   HENT:  Negative for sore throat.    Respiratory:  Negative for cough and shortness of breath.    Cardiovascular:  Positive for chest pain.   Gastrointestinal:  Positive for abdominal pain. Negative for nausea and vomiting.   Genitourinary:  Negative for dysuria.   Musculoskeletal:  Negative for back pain.   Skin:  Negative for rash.   Neurological:  Negative for weakness, light-headedness and headaches.   Hematological:  Does not bruise/bleed easily.   All other systems reviewed and are negative.       Physical Exam     Initial Vitals [24 1048]   BP Pulse Resp Temp SpO2   130/60 74 18 97.9 °F (36.6 °C) 98 %      MAP       --          Physical Exam  Nursing Notes and Vital Signs Reviewed.  Constitutional: Patient is in no acute distress. Well-developed and well-nourished.  Head: Atraumatic. Normocephalic.  Eyes:  EOM intact.  No scleral icterus.  ENT: Mucous membranes are moist.  Nares clear   Neck:  Full ROM. No JVD.  Cardiovascular: Regular rate. Regular rhythm No murmurs, rubs, or gallops. Distal pulses are 2+ and symmetric  Pulmonary/Chest: No respiratory distress. Clear to auscultation bilaterally. No wheezing or rales.  Equal chest wall rise bilaterally  Abdominal: Soft and non-distended.  There is no tenderness.  No rebound, guarding, or rigidity. Good bowel sounds.  Genitourinary: No CVA tenderness.  No suprapubic tenderness  Musculoskeletal: Moves all extremities. No obvious deformities.  5 x 5 strength in all extremities   Skin: Warm and dry.  Neurological:  Alert, awake, and appropriate.  Normal speech.  No  "acute focal neurological deficits are appreciated.  Two through 12 intact bilaterally.  Psychiatric: Normal affect. Good eye contact. Appropriate in content.       ED Course   Procedures  ED Vital Signs:  Vitals:    08/26/24 1048 08/26/24 1130 08/26/24 1210 08/26/24 1216   BP: 130/60 130/62 (!) 143/70    Pulse: 74 74 69    Resp: 18 19 18    Temp: 97.9 °F (36.6 °C)      TempSrc: Oral      SpO2: 98% 98% 96% 98%   Weight: 102.7 kg (226 lb 6.6 oz)      Height: 5' 7" (1.702 m)       08/26/24 1224 08/26/24 1234 08/26/24 1318   BP: (!) 153/70  (!) 184/91   Pulse:   70   Resp: 18 17 18   Temp:      TempSrc:      SpO2: 98%  100%   Weight:      Height:          Abnormal Lab Results:  Labs Reviewed   CBC W/ AUTO DIFFERENTIAL - Abnormal       Result Value    WBC 3.88 (*)     RBC 3.43 (*)     Hemoglobin 10.4 (*)     Hematocrit 31.6 (*)     MCV 92      MCH 30.3      MCHC 32.9      RDW 17.8 (*)     Platelets 136 (*)     MPV 9.7      Immature Granulocytes 0.5      Gran # (ANC) 2.3      Immature Grans (Abs) 0.02      Lymph # 0.9 (*)     Mono # 0.5      Eos # 0.1      Baso # 0.02      nRBC 0      Gran % 60.3      Lymph % 24.0      Mono % 12.6      Eosinophil % 2.1      Basophil % 0.5      Differential Method Automated     COMPREHENSIVE METABOLIC PANEL - Abnormal    Sodium 141      Potassium 3.8      Chloride 106      CO2 25      Glucose 125 (*)     BUN 22      Creatinine 1.1      Calcium 8.5 (*)     Total Protein 6.5      Albumin 3.4 (*)     Total Bilirubin 0.2      Alkaline Phosphatase 65      AST 17      ALT 11      eGFR >60      Anion Gap 10     B-TYPE NATRIURETIC PEPTIDE - Abnormal     (*)    TROPONIN I    Troponin I 0.015          All Lab Results:  Results for orders placed or performed during the hospital encounter of 08/26/24   CBC auto differential   Result Value Ref Range    WBC 3.88 (L) 3.90 - 12.70 K/uL    RBC 3.43 (L) 4.60 - 6.20 M/uL    Hemoglobin 10.4 (L) 14.0 - 18.0 g/dL    Hematocrit 31.6 (L) 40.0 - 54.0 %    " MCV 92 82 - 98 fL    MCH 30.3 27.0 - 31.0 pg    MCHC 32.9 32.0 - 36.0 g/dL    RDW 17.8 (H) 11.5 - 14.5 %    Platelets 136 (L) 150 - 450 K/uL    MPV 9.7 9.2 - 12.9 fL    Immature Granulocytes 0.5 0.0 - 0.5 %    Gran # (ANC) 2.3 1.8 - 7.7 K/uL    Immature Grans (Abs) 0.02 0.00 - 0.04 K/uL    Lymph # 0.9 (L) 1.0 - 4.8 K/uL    Mono # 0.5 0.3 - 1.0 K/uL    Eos # 0.1 0.0 - 0.5 K/uL    Baso # 0.02 0.00 - 0.20 K/uL    nRBC 0 0 /100 WBC    Gran % 60.3 38.0 - 73.0 %    Lymph % 24.0 18.0 - 48.0 %    Mono % 12.6 4.0 - 15.0 %    Eosinophil % 2.1 0.0 - 8.0 %    Basophil % 0.5 0.0 - 1.9 %    Differential Method Automated    Comprehensive metabolic panel   Result Value Ref Range    Sodium 141 136 - 145 mmol/L    Potassium 3.8 3.5 - 5.1 mmol/L    Chloride 106 95 - 110 mmol/L    CO2 25 23 - 29 mmol/L    Glucose 125 (H) 70 - 110 mg/dL    BUN 22 8 - 23 mg/dL    Creatinine 1.1 0.5 - 1.4 mg/dL    Calcium 8.5 (L) 8.7 - 10.5 mg/dL    Total Protein 6.5 6.0 - 8.4 g/dL    Albumin 3.4 (L) 3.5 - 5.2 g/dL    Total Bilirubin 0.2 0.1 - 1.0 mg/dL    Alkaline Phosphatase 65 55 - 135 U/L    AST 17 10 - 40 U/L    ALT 11 10 - 44 U/L    eGFR >60 >60 mL/min/1.73 m^2    Anion Gap 10 8 - 16 mmol/L   Troponin I #1   Result Value Ref Range    Troponin I 0.015 0.000 - 0.026 ng/mL   BNP   Result Value Ref Range     (H) 0 - 99 pg/mL     *Note: Due to a large number of results and/or encounters for the requested time period, some results have not been displayed. A complete set of results can be found in Results Review.         Imaging Results:  Imaging Results    None          The EKG was ordered, reviewed, and independently interpreted by the ED provider.  Interpretation time: 11:47  Rate: 71 BPM  Rhythm: Sinus rhythm with 1st degree AV block  Interpretation: No acute ST changes. No STEMI.             The Emergency Provider reviewed the vital signs and test results, which are outlined above.     ED Discussion     1:21 PM: Discussed case with Tima CHOUDHARY  MD David ([facility]). Dr. Hernandez agrees with current care and management of pt and accepts admission.   Admitting Service: Hospital Medicine  Admitting Physician: Dr. Hernandez  Admit to: obs, tele    1:22 PM: Re-evaluated pt. I have discussed test results, shared treatment plan, and the need for admission with patient and family at bedside. Pt and family express understanding at this time and agree with all information. All questions answered. Pt and family have no further questions or concerns at this time. Pt is ready for admit.        ED Course as of 08/26/24 1329   Mon Aug 26, 2024   1200 Cardiac monitor interpretation  Independent interpretation  Indication: Chest pain  Normal sinus rhythm.  Rate 48.  No STEMI [RT]      ED Course User Index  [RT] Daryl Arevalo Jr., MD     Medical Decision Making  Differential diagnosis: GERD, acid reflux, chest pain, ASCVD, cardiac ischemia    Patient was extensive cardiac history.  He was complaining of epigastric pain consistent with a past GERD however he was now having exertional symptoms.  He was treated with aspirin nitro.  Workup was benign however the patient's symptoms are concerning in light of his high-risk chest pain.  He was being admitted to Hospital Medicine further evaluation and treatment.  He was pain-free on arrival    Amount and/or Complexity of Data Reviewed  External Data Reviewed: labs and notes.  Labs: ordered. Decision-making details documented in ED Course.     Details: BNP is 260 with a troponin 0.015.  CMP shows a mild elevation in glucose at 1:25 a.m. he was and calcium 8.5 but is otherwise benign.  Has a normal white count 3.8 with a hemoglobin 10.4  ECG/medicine tests: ordered and independent interpretation performed. Decision-making details documented in ED Course.     Details: No STEMI  Discussion of management or test interpretation with external provider(s): Discussed the case with hospital medicine graciously accepts    Risk  OTC  drugs.  Prescription drug management.  Decision regarding hospitalization.       Additional MDM:   Heart Score:    History:          Moderately suspicious.  ECG:             Normal  Age:               >65 years  Risk factors: >= 3 risk factors or history of atherosclerotic disease  Troponin:       Less than or equal to normal limit  Heart Score = 5                ED Medication(s):  Medications   famotidine (PF) injection 20 mg (20 mg Intravenous Given 8/26/24 1135)   aluminum-magnesium hydroxide-simethicone 200-200-20 mg/5 mL suspension 15 mL (15 mLs Oral Given 8/26/24 1135)   morphine injection 4 mg (4 mg Intravenous Given 8/26/24 1234)   ondansetron injection 4 mg (4 mg Intravenous Given 8/26/24 1235)       New Prescriptions    No medications on file               Scribe Attestation:   Scribe #1: I performed the above scribed service and the documentation accurately describes the services I performed. I attest to the accuracy of the note.     Attending:   Physician Attestation Statement for Scribe #1: I, Daryl Arevalo Jr., MD, personally performed the services described in this documentation, as scribed by Nale Kerr, in my presence, and it is both accurate and complete.           Clinical Impression       ICD-10-CM ICD-9-CM   1. Epigastric pain  R10.13 789.06   2. Chest pain  R07.9 786.50   3. Gastroesophageal reflux disease, unspecified whether esophagitis present  K21.9 530.81       Disposition:   Disposition: Admitted  Condition: Stable         Daryl Arevalo Jr., MD  08/26/24 8410

## 2024-08-26 NOTE — Clinical Note
The radial band was applied to the left radial artery. 9 cc's of air were inserted into the closure device.

## 2024-08-26 NOTE — SUBJECTIVE & OBJECTIVE
Past Medical History:   Diagnosis Date    Anticoagulant long-term use     Aortic stenosis     Asthma     Benign prostatic hyperplasia with nocturia     Bilateral carotid artery stenosis 07/21/2021    US CAROTID BILATERAL 07/14/2021 IMPRESSION: Atherosclerosis with suspected stenosis greater than 50% at the right proximal ICA visually. Velocities are discordant and appear improved from prior. Atherosclerosis on the left with no evidence of flow-limiting stenosis greater than 50%.  FINDINGS: Right: Internal Carotid Artery (ICA): Peak systolic velocity 118 cm/sec. End diastolic velocity 35 cm/sec    BPH (benign prostatic hyperplasia)     CAD (coronary artery disease)     40% lesion 2002;lazo    Cirrhosis     Claudication 04/09/2014    Colon polyp     COPD (chronic obstructive pulmonary disease)     ED (erectile dysfunction)     Encounter for blood transfusion     Ex-smoker     Hearing loss NEC     Hepatitis C     Cured;reji borwn 2015    Hyperlipidemia     Hypertension     Hypothyroid     s/p tx graves    Open angle with borderline findings and low glaucoma risk in both eyes 09/22/2020    DELONTE on CPAP     Osteoarthritis     Paroxysmal atrial fibrillation     PVD (peripheral vascular disease)     Secondary esophageal varices without bleeding 06/26/2017    Type 2 diabetes mellitus        Past Surgical History:   Procedure Laterality Date    ABLATION OF ARRHYTHMOGENIC FOCUS FOR ATRIAL FIBRILLATION N/A 6/24/2024    Procedure: Ablation atrial fibrillation;  Surgeon: Horacio Huerta MD;  Location: [facility] EP LAB;  Service: Cardiology;  Laterality: N/A;  afib, PVI, RFA, BERNARD (cx if SR), ZIA, anes, MB, 3 Prep, 3 hours per Dr. Huerta    ANGIOGRAM, EXTREMITY, UNILATERAL Left 1/4/2024    Procedure: ANGIOGRAM, EXTREMITY, UNILATERAL- Femoral / SMS Stent, Poss General;  Surgeon: ALEX Alfred III, MD;  Location: [facility] OR [address] [place] [address];  Service: Vascular;  Laterality: Left;  mGy : 2698.78  Gy.cm : 229.88  Contrast :  62ml  Fluro time : 13.8min    CARDIAC CATHETERIZATION      CARDIAC SURGERY      CARPAL TUNNEL RELEASE Left     CATARACT EXTRACTION      CATARACT EXTRACTION W/ INTRAOCULAR LENS  IMPLANT, BILATERAL  2008    CATHETERIZATION OF BOTH LEFT AND RIGHT HEART N/A 11/2/2018    Procedure: CATHETERIZATION, HEART, BOTH LEFT AND RIGHT;  Surgeon: Frank Gallardo MD;  Location: [facility] CATH LAB;  Service: Cardiology;  Laterality: N/A;    COLONOSCOPY  8/2013    COLONOSCOPY N/A 5/30/2016    Procedure: COLONOSCOPY;  Surgeon: Anna Tomas MD;  Location: [facility] ENDO;  Service: Endoscopy;  Laterality: N/A;    COLONOSCOPY N/A 7/17/2019    Procedure: COLONOSCOPY;  Surgeon: David Carter III, MD;  Location: [facility] ENDO;  Service: Endoscopy;  Laterality: N/A;    COLONOSCOPY N/A 12/14/2023    Procedure: COLONOSCOPY;  Surgeon: Ama Barrera MD;  Location: [facility] ENDO;  Service: Endoscopy;  Laterality: N/A;    COLONOSCOPY N/A 7/27/2024    Procedure: COLONOSCOPY;  Surgeon: Ama Barrera MD;  Location: [facility] ENDO;  Service: Endoscopy;  Laterality: N/A;    COMPUTED TOMOGRAPHY N/A 9/9/2019    Procedure: CT (COMPUTED TOMOGRAPHY);  Surgeon: [facility] Diagnostic Provider;  Location: [facility] OR;  Service: General;  Laterality: N/A;  Microwave ablation to be done in CT.  Need CRNA and cart    COMPUTED TOMOGRAPHY N/A 1/25/2022    Procedure: Liver Microwave Ablation;  Surgeon: Red Puentes MD;  Location: [facility] ER;  Service: General;  Laterality: N/A;    CORONARY ARTERY BYPASS GRAFT (CABG) N/A 11/5/2018    Procedure: CORONARY ARTERY BYPASS GRAFT (CABG);  Surgeon: Bear De Luna MD;  Location: [facility] OR;  Service: Cardiothoracic;  Laterality: N/A;  TWO VESSEL BYPASS WITH AORTOTOMY AND EXCISION OF ATHEROSCLEROTIC PLAQUE    CORONARY BYPASS GRAFT ANGIOGRAPHY  3/2/2020    Procedure: Bypass graft study;  Surgeon: Cora Cormier MD;  Location: [facility] CATH LAB;  Service: Cardiology;;    ECHOCARDIOGRAM,TRANSESOPHAGEAL N/A 6/24/2024    Procedure: Transesophageal  echo (BERNARD) intra-procedure log documentation;  Surgeon: Nacho Downs MD;  Location: [facility] EP LAB;  Service: Cardiology;  Laterality: N/A;    ELBOW BURSA SURGERY Right 2010    ENDOSCOPIC HARVEST OF VEIN Left 11/5/2018    Procedure: SURGICAL PROCUREMENT, VEIN, ENDOSCOPIC;  Surgeon: Bear De Luna MD;  Location: [facility];  Service: Cardiothoracic;  Laterality: Left;    ESOPHAGOGASTRODUODENOSCOPY N/A 7/17/2019    Procedure: ESOPHAGOGASTRODUODENOSCOPY (EGD);  Surgeon: David Carter III, MD;  Location: [facility];  Service: Endoscopy;  Laterality: N/A;    ESOPHAGOGASTRODUODENOSCOPY N/A 12/29/2022    Procedure: ESOPHAGOGASTRODUODENOSCOPY (EGD);  Surgeon: Issa Gayle MD;  Location: [facility];  Service: Endoscopy;  Laterality: N/A;    ESOPHAGOGASTRODUODENOSCOPY N/A 1/17/2024    Procedure: EGD (ESOPHAGOGASTRODUODENOSCOPY);  Surgeon: Issa Gayle MD;  Location: [facility];  Service: Endoscopy;  Laterality: N/A;    ESOPHAGOGASTRODUODENOSCOPY N/A 4/30/2024    Procedure: EGD (ESOPHAGOGASTRODUODENOSCOPY);  Surgeon: Eder Foley MD;  Location: [facility];  Service: Gastroenterology;  Laterality: N/A;    ESOPHAGOGASTRODUODENOSCOPY N/A 7/27/2024    Procedure: EGD (ESOPHAGOGASTRODUODENOSCOPY);  Surgeon: Ama Barrera MD;  Location: [facility];  Service: Endoscopy;  Laterality: N/A;    ETHMOIDECTOMY Bilateral 3/27/2019    Procedure: ETHMOIDECTOMY;  Surgeon: Alejandro Parkinson MD;  Location: [facility];  Service: ENT;  Laterality: Bilateral;    EYE SURGERY      FOOT SURGERY      FRONTAL SINUS OBLITERATION Bilateral 3/27/2019    Procedure: SINUSOTOMY, FRONTAL SINUS, OBLITERATIVE;  Surgeon: Alejandro Parkinson MD;  Location: [facility];  Service: ENT;  Laterality: Bilateral;    FUNCTIONAL ENDOSCOPIC SINUS SURGERY (FESS) Bilateral 3/27/2019    Procedure: FESS (FUNCTIONAL ENDOSCOPIC SINUS SURGERY);  Surgeon: Alejandro Parkinson MD;  Location: [facility];  Service: ENT;  Laterality: Bilateral;  Left Keila bullosa resection      INTRALUMINAL GASTROINTESTINAL TRACT IMAGING VIA CAPSULE N/A 2/2/2024    Procedure: IMAGING PROCEDURE, GI TRACT, INTRALUMINAL, VIA CAPSULE;  Surgeon: Cooper sEtrella RN;  Location: [facility] ENDO;  Service: Endoscopy;  Laterality: N/A;    INTRALUMINAL GASTROINTESTINAL TRACT IMAGING VIA CAPSULE N/A 7/31/2024    Procedure: IMAGING PROCEDURE, GI TRACT, INTRALUMINAL, VIA CAPSULE;  Surgeon: Cooper Estrella RN;  Location: [facility] ENDO;  Service: Endoscopy;  Laterality: N/A;    KNEE ARTHROSCOPY W/ MENISCAL REPAIR Left 06/2019    dr boykin    KNEE SURGERY Right     LEFT HEART CATHETERIZATION Left 3/2/2020    Procedure: CATHETERIZATION, HEART, LEFT;  Surgeon: Cora Cormier MD;  Location: [facility] CATH LAB;  Service: Cardiology;  Laterality: Left;    LIVER BIOPSY  01/2022    MAXILLARY ANTROSTOMY Bilateral 3/27/2019    Procedure: MAXILLARY ANTROSTOMY;  Surgeon: Alejandro Parkinson MD;  Location: [facility] OR;  Service: ENT;  Laterality: Bilateral;    NASAL SEPTOPLASTY N/A 3/27/2019    Procedure: SEPTOPLASTY, NOSE;  Surgeon: Alejandro Parkinson MD;  Location: [facility] OR;  Service: ENT;  Laterality: N/A;    RECTAL SURGERY  2012    STENT, SUPERIOR MESENTERIC ARTERY Left 1/4/2024    Procedure: STENT, SUPERIOR MESENTERIC ARTERY;  Surgeon: ALEX Alfred III, MD;  Location: [address];  Service: Vascular;  Laterality: Left;    TRANSURETHRAL RESECTION OF PROSTATE (TURP) WITHOUT USE OF LASER N/A 6/19/2018    Procedure: TURP, WITHOUT USING LASER;  Surgeon: Cooper Cordova IV, MD;  Location: [facility] OR;  Service: Urology;  Laterality: N/A;    TRIGGER FINGER RELEASE Left        Review of patient's allergies indicates:   Allergen Reactions    Lipitor [atorvastatin] Other (See Comments)     Muscle aches and pains as well as fatigue.     Niacin preparations Other (See Comments)     Causes broken blood vessels and bruises at 4 times normal dose.    Statins-hmg-coa reductase inhibitors Other (See Comments)     Statins cause intolerable myalgias.    Iodinated contrast media  Hives     Tachycardia    Omeprazole Hives and Itching    Prilosec [omeprazole magnesium] Hives and Itching       Current Facility-Administered Medications on File Prior to Encounter   Medication    lactated ringers infusion     Current Outpatient Medications on File Prior to Encounter   Medication Sig    ACCU-CHEK GRACE PLUS METER Misc AS DIRECTED    albuterol (PROVENTIL HFA) 90 mcg/actuation inhaler Inhale 2 puffs into the lungs every 6 (six) hours as needed for Wheezing. Rescue    aspirin (ECOTRIN) 81 MG EC tablet Take 1 tablet (81 mg total) by mouth once daily.    blood sugar diagnostic (ACCU-CHEK GRACE PLUS TEST STRP) Strp TEST THREE TIMES A DAY    budesonide-formoterol 160-4.5 mcg (SYMBICORT) 160-4.5 mcg/actuation HFAA INHALE 2 PUFFS INTO THE LUNGS TWO TIMES A DAY. (Patient taking differently: Inhale 2 puffs into the lungs every 12 (twelve) hours. INHALE 2 PUFFS INTO THE LUNGS TWO TIMES A DAY.)    carvediloL (COREG) 12.5 MG tablet Take 2 tablets (25 mg total) by mouth 2 (two) times daily with meals.    empagliflozin (JARDIANCE) 25 mg tablet Take 1 tablet (25 mg total) by mouth once daily.    famotidine (PEPCID) 40 MG tablet Take 40 mg by mouth once daily.    finasteride (PROSCAR) 5 mg tablet TAKE 1 TABLET BY MOUTH once daily    furosemide (LASIX) 40 MG tablet Take 1 tablet (40 mg total) by mouth 2 (two) times daily.    GLUCOSAMINE HCL/CHONDR ROSARIO A NA (OSTEO BI-FLEX ORAL) Take 1 tablet by mouth 2 (two) times daily.     krill oil 500 mg Cap Take 1 capsule by mouth Daily.    lancets (ACCU-CHEK FASTCLIX LANCET DRUM) Misc TEST TWO TIMES A DAY    LANTUS SOLOSTAR U-100 INSULIN 100 unit/mL (3 mL) InPn pen INJECT 44 UNITS UNDER THE SKIN EVERY EVENING    levothyroxine (SYNTHROID) 300 MCG Tab Take 1 tablet (300 mcg total) by mouth every morning.    lisinopriL 10 MG tablet Take 1 tablet by mouth once daily    metFORMIN (GLUCOPHAGE-XR) 500 MG ER 24hr tablet Take 1 tablet (500 mg total) by mouth 2 (two) times daily with  "meals.    mirabegron (MYRBETRIQ) 25 mg Tb24 ER tablet Take 1 tablet (25 mg total) by mouth once daily.    montelukast (SINGULAIR) 10 mg tablet Take 1 tablet (10 mg total) by mouth once daily.    pen needle, diabetic (BD ULTRA-FINE MINI PEN NEEDLE) 31 gauge x 3/16" Ndle USE AS DIRECTED    RABEprazole (ACIPHEX) 20 mg tablet Take 1 tablet (20 mg total) by mouth 2 (two) times daily.    ranolazine (RANEXA) 1,000 mg Tb12 Take 1 tablet (1,000 mg total) by mouth 2 (two) times daily.    REPATHA SURECLICK 140 mg/mL PnIj INJECT 140mg subcutaneously every 14 days (Patient taking differently: Inject 140 mg into the skin every 14 (fourteen) days.)    rivaroxaban (XARELTO) 20 mg Tab Take 20 mg by mouth daily with dinner or evening meal.    inclisiran (LEQVIO) 284 mg/1.5 mL Syrg subcutaneous injection Inject 1.5 mLs (284 mg total) into the skin every 3 (three) months.    inclisiran (LEQVIO) 284 mg/1.5 mL Syrg subcutaneous injection Inject 1.5 mLs (284 mg total) into the skin every 6 (six) months.    propafenone (RHTHYMOL) 150 MG Tab Take 1 tablet (150 mg total) by mouth every 8 (eight) hours.    sildenafiL (VIAGRA) 100 MG tablet Take 1/2 to 1 tablet by mouth one hour prior to intercourse. Max 100mg per day     Family History       Problem Relation (Age of Onset)    Heart disease Mother, Father, Brother          Tobacco Use    Smoking status: Former     Current packs/day: 0.00     Average packs/day: 0.5 packs/day for 4.0 years (2.0 ttl pk-yrs)     Types: Cigarettes     Start date: 1968     Quit date: 1972     Years since quittin.2    Smokeless tobacco: Former     Types: Chew     Quit date: 1976   Substance and Sexual Activity    Alcohol use: Not Currently    Drug use: No    Sexual activity: Yes     Partners: Female     Review of Systems   Constitutional:  Positive for activity change (CP with exertion).   Respiratory:  Positive for chest tightness. Negative for shortness of breath.    Cardiovascular:  Positive " for chest pain and leg swelling.   Gastrointestinal: Negative.  Negative for abdominal distention, constipation, diarrhea, nausea and vomiting.   Genitourinary: Negative.    Musculoskeletal:  Positive for arthralgias (chronic).   Skin: Negative.    Neurological: Negative.      Objective:     Vital Signs (Most Recent):  Temp: 97.9 °F (36.6 °C) (08/26/24 1048)  Pulse: 70 (08/26/24 1318)  Resp: 18 (08/26/24 1318)  BP: (!) 184/91 (08/26/24 1318)  SpO2: 100 % (08/26/24 1318) Vital Signs (24h Range):  Temp:  [97.9 °F (36.6 °C)] 97.9 °F (36.6 °C)  Pulse:  [69-74] 70  Resp:  [17-19] 18  SpO2:  [96 %-100 %] 100 %  BP: (130-184)/(60-91) 184/91     Weight: 102.7 kg (226 lb 6.6 oz)  Body mass index is 35.46 kg/m².     Physical Exam  Vitals and nursing note reviewed.   Constitutional:       General: He is not in acute distress.     Appearance: He is ill-appearing (chronically).   HENT:      Mouth/Throat:      Mouth: Mucous membranes are moist.      Pharynx: Oropharynx is clear.   Eyes:      Pupils: Pupils are equal, round, and reactive to light.   Cardiovascular:      Rate and Rhythm: Normal rate and regular rhythm.      Heart sounds: No murmur heard.  Pulmonary:      Effort: Pulmonary effort is normal.      Breath sounds: Normal breath sounds. No wheezing.   Abdominal:      General: Bowel sounds are normal. There is no distension.      Palpations: Abdomen is soft.      Tenderness: There is no abdominal tenderness.   Musculoskeletal:         General: Normal range of motion.   Skin:     General: Skin is warm and dry.   Neurological:      General: No focal deficit present.      Mental Status: He is alert and oriented to person, place, and time.              CRANIAL NERVES     CN III, IV, VI   Pupils are equal, round, and reactive to light.       Significant Labs: All pertinent labs within the past 24 hours have been reviewed.  Recent Lab Results         08/26/24  1431   08/26/24  1133   08/26/24  1047        Albumin   3.4          ALP   65         ALT   11         Anion Gap   10         PTT 31.9  Comment: Refer to local heparin nomogram for intensity/dose specific   therapeutic   range.             AST   17         Baso # 0.02   0.02         Basophil % 0.5   0.5         BILIRUBIN TOTAL   0.2  Comment: For infants and newborns, interpretation of results should be based  on gestational age, weight and in agreement with clinical  observations.    Premature Infant recommended reference ranges:  Up to 24 hours.............<8.0 mg/dL  Up to 48 hours............<12.0 mg/dL  3-5 days..................<15.0 mg/dL  6-29 days.................<15.0 mg/dL           BNP   260  Comment: Values of less than 100 pg/ml are consistent with non-CHF populations.         BUN   22         Calcium   8.5         Chloride   106         CO2   25         Creatinine   1.1         Differential Method Automated   Automated         eGFR   >60         Eos # 0.1   0.1         Eos % 2.3   2.1         Glucose   125         Gran # (ANC) 2.5   2.3         Gran % 62.1   60.3         Hematocrit 33.6   31.6         Hemoglobin 10.9   10.4         Immature Grans (Abs) 0.01  Comment: Mild elevation in immature granulocytes is non specific and   can be seen in a variety of conditions including stress response,   acute inflammation, trauma and pregnancy. Correlation with other   laboratory and clinical findings is essential.     0.02  Comment: Mild elevation in immature granulocytes is non specific and   can be seen in a variety of conditions including stress response,   acute inflammation, trauma and pregnancy. Correlation with other   laboratory and clinical findings is essential.           Immature Granulocytes 0.3   0.5         INR 1.0  Comment: Coumadin Therapy:  2.0 - 3.0 for INR for all indicators except mechanical heart valves  and antiphospholipid syndromes which should use 2.5 - 3.5.             Lymph # 0.9   0.9         Lymph % 23.3   24.0         MCH 29.9   30.3         MCHC  32.4   32.9         MCV 92   92         Mono # 0.5   0.5         Mono % 11.5   12.6         MPV 10.0   9.7         nRBC 0   0         QRS Duration     98       OHS QTC Calculation     425       Platelet Count 151   136         Potassium   3.8         PROTEIN TOTAL   6.5         PT 12.0           RBC 3.64   3.43         RDW 17.7   17.8         Sodium   141         Troponin I   0.015  Comment: The reference interval for Troponin I represents the 99th percentile   cutoff   for our facility and is consistent with 3rd generation assay   performance.           WBC 4.00   3.88                 Significant Imaging: I have reviewed all pertinent imaging results/findings within the past 24 hours.

## 2024-08-26 NOTE — Clinical Note
The catheter was repositioned into the ostial SVG graft. An angiography was performed of the SVG to OM2. Multiple views were taken.

## 2024-08-26 NOTE — Clinical Note
The catheter was repositioned into the left subclavian artery. Pullback was recorded.  An angiography was performed of the L Subclavian.

## 2024-08-26 NOTE — Clinical Note
The DP pulses were 1+ bilaterally. The PT pulses were 1+ bilaterally. The left radial pulse was allens test negative.

## 2024-08-26 NOTE — ASSESSMENT & PLAN NOTE
-Patient with history of CABG who presents with exertional CP, concerning for angina  -Reported relief with morphine given in ED  -Initial troponin negative, continue to trend  -Continue ASA, BB, ACEi, Ranexa  -Consider addition of nitrates and amlodipine  -Recent MPI stress test from 6/24 negative  -TTE 6/24 with normal EF, mod AS  -Will consider [facility] tmw for definitive evaluation, keep NPO after [place]

## 2024-08-26 NOTE — Clinical Note
Referred by: Lauri Drew MD; Medical Diagnosis (from order):    Diagnosis Information      Diagnosis    724.4 (ICD-9-CM) - M54.16 (ICD-10-CM) - Lumbar radiculopathy                Physical Therapy -  Initial Evaluation -  Daily Treatment Note    Visit:  1   Treatment Diagnosis: lumbarsymptoms with increased pain/symptoms, impaired range of motion, impaired mobility, impaired activity tolerance  Date of onset: 3/14/2020 fell off the segway onto left hip.  Chart reviewed at time of initial evaluation (relevant co-morbidities, allergies, tests and medications listed): Past Medical History:  No date: Anxiety  No date: History of HPV infection  No date: Ovarian cyst  No date: PCOS (polycystic ovarian syndrome)  No date: PMDD (premenstrual dysphoric disorder)    SUBJECTIVE                                                                                                             7/08/2020:  INITIAL EVALUATION: Patient is a 41 yo right hand dominant female comes to PT for bilateral LBP with radicular symptoms.  Patient states she fell off a Segway 3 months ago causing pinched nerve.  She has a h/o back problem since high school. Played volleyball one year in HS and then recreationally.  LBP has been bothering her for the past year and falling off the segway just aggravated it.   Patient states she went to the ER for unusual symptoms in lungs and tingling down legs.  Tested negative for COVID.  Was on a series of Medrol pack that ended last Thursday.  The tingling down both legs she relates to the odd virus she had with the lungs.  Left LBP is different from the virus symptoms that she no longer has.  Saw chiropractor and had neck and low back adjusted, had a massage.  She was assessed for orthotics.  Her gait is off.  They determined her legs are turned outward and that's how she stands.  She plays with kids a lot and sits cross legged.  She rolls out her ITB, takes epsom salt baths.  Went into ER last Wednesday due to  The sheath was inserted into the left radial artery. had severe left calf pain, she thought she had a DVT.  Doppler was negative.  Current symptoms:  Left low back, glut, hip.  Sometimes it would send shooting pain down LLE, which occurred 2 months ago.  She is conscious on her posture.  Also has a prolapsed uterus with minor stress incontinence.  Occupation:  Psycho-therapist for pediatric population.  Sitting at desk performing virtual appointments on the computer.  Tends sit with leg crossed or resting on another chair.   Functional Change: Due to acute episode of LBP, patient is limited in her ability to lift heavy objects, tolerate prolonged standing or sitting, which is required for her work.      Pain / Symptoms:  Pain/symptom is: constant  Pain rating (out of 10): Current: 2 ; Best: 1; Worst: 5  Quality / Description: ache, throbbing, sharp.  Alleviating Factors: massage, ice. Epsom salt baths.  chiropractor    Progression since onset: improved  Function:   Limitations / Exacerbation Factors: pain, difficulty, meal/food prep, grocery shopping, standing tasks, sitting tasks, computer tasks, house/yard work, work - limited or modified, bending/squatting/lifting, lifting/carrying, standing, sitting and walking, community distances, Unable to perform the elliptical.  Prior Level of Function: declining function, therefore referred to therapy,  Patient Goals: return to sport/leisure activities, decreased pain and increased strength    Prior treatment: chiropractic services, massage services  Discharged from hospital, home health, or skilled nursing facility in last 30 days: no    Home Environment:   Patient lives with children. significant other (5, 7, 14, 18 yo children)  Type of home: multiple level home; Bathroom setup: walk-in shower  Assistance available: consistent  Feels safe at home/work/school.  2 or more falls or an unexplained fall with injury in the last year:  No    OBJECTIVE                                                                                                                      Hand Dominance: right-handed;   Observation:   Spine:    Lower chain in standing: left: hip external rotation, toe out, supinated foot position; right: hip external rotation, toe out, supinated foot position  Seated Posture: good  Standing Posture: good  Observed Gait:     Weight bearing: Left: full  Right: full     Bilateral feet supinated. Lacks full toe-off.  Range of Motion (ROM)   (norms in parentheses, degrees unless noted, active unless noted):   Lumbar:  Impairment Level:       - Flexion: pain and WNL    - Extension: WNL    - Side Bend:        • Left: minimal impairment, pain         • Right: WNL     Strength  (out of 5 unless noted, standard test position unless noted, lbs tested with hand held dynamometer)   WFL: LLE, RLE      Oswestry: Score:  26  0-20% = minimal disability; 20-40% = moderate disability; 40-60% = severe disability; 60-80% = crippled; % = bed bound    TREATMENT                                                                                                                initial evaluation completed  Therapeutic Exercise:  Education on eval findings  Education on pain precautions  *Prone - left LBP subsides.  *prone on elbows  *Press ups. - end range pain, no worse.  Improved AROM L-S ext and left SGIS, with less end range pain.    Therapeutic Activity:  Education on sitting posture with rolled towel.    Skilled input: verbal instruction/cues, tactile instruction/cues and posture correction    Home Exercise Program: (*above indicates provided as part of home exercise program)  *Prone - left LBP subsides.  *prone on elbows  *Press ups.  Sitting posture      ASSESSMENT                                                                                                             40 year old female patient has reported functional limitations listed above impacted by signs and symptoms consistent with lumbar, increased pain/symptoms, impaired range of  motion, impaired mobility and impaired activity tolerance.  Patient also is flexible in her mobility.    Prognosis: patient will benefit from skilled therapy   Potential: good  Predicted patient presentation: Low (stable) - Patient comorbidities and complexities, as defined above, will have little effect on progress for prescribed plan of care.    Patient Education:   Who will be receiving education: patient  Are they ready to learn: yes  Preferred learning style: written, verbal and demonstration  Barriers to learning: no barriers apparent at this time  Results of above outlined education: Verbalizes understanding and Demonstrates understanding    Assistant to continue with current plan of care, current goals are appropriate, patient progressing towards set goals      PLAN                                                                                                                           The following skilled interventions to be implemented to achieve goals listed below:  Activities of Daily Living/Self Care (57621)  Gait Training (64350)  Manual Therapy (33043)  Neuromuscular Re-Education (64772)  Therapeutic Activity (63730)  Therapeutic Exercise (80518)  Electrical Stimulation (84988//63880)   Heat/Cold (00270)  Aquatic Therapy (39911)      Frequency / Duration: 1 times per week tapering as patient progresses for an estimated total of 6 visits for 6 weeks    patient involved in and agreed to plan of care and goals.    Suggestions for next session as indicated: Progress per plan of care    Please check hip IR/ER ROM.  Assess effects of HEP, progress as appropriate.    GOALS                                                                                                                       Long Term Goals: To be met by end of plan of care:      Home Exercise Program: Independent with progressed and modified home exercise program (HEP)    Task Modification: Independent with instructed task modifications       Pain: Decrease pain/symptoms to 0  Strength: Improve involved strength to , to tolerate heavy lifting, carrying     Lift: Lift household items, moderate weight household items, heavy household items, with reported manageable/tolerable difficulty and groceries   Bend/Squat: Bend/squat for activities of daily living and instrumental activites of daily living without reported difficulty       Procedures and total treatment time documented Time Entry flowsheet.

## 2024-08-26 NOTE — H&P
Formerly [facility] Emergency Dept.  [facility] Medicine  History & Physical    Patient Name: Erendira Pretty  MRN: [id number]  Patient Class: OP- Observation  Admission Date: 8/26/2024  Attending Physician: Tima Hernandez MD   Primary Care Provider: Dora Primary Doctor         Patient information was obtained from patient, past medical records, and ER records.     Subjective:     Principal Problem:Chest pain    Chief Complaint:   Chief Complaint   Patient presents with    Chest Pain     Pt. Reports chest pain for several days. Hx of A-fib        HPI: Mr. Erendira Pretty is a 79 year old male with a past medical history of aortic stenosis, BPH, bilateral carotid artery stenosis, HLD, HTN, PAF on xarelto and ASA, PVD, asthma, cirrhosis, COPD, Hep C, hypothyroidism, DELONTE on CPAP, OA, Type 2 Diabetes Mellitus who presented to the ER with complaints of worsening CP. Patient reports over the past 2-3 days, states it is made worse with exertion. States the pain comes and goes, reports it has worsened in severity. Does endorse mild BLE swelling. Denies any SOB, nausea, vomiting, abd pain, or dysuria. In ER: H/H 10.9/33.6, PLT count 151, PT 12, INR 1.0, Sodium 141, Potassium 3.8, BUN 22, Crit 1.1, , Trop 0.015, EKG: First degree AV block, Premature atrial complexes, Sinus rhythm. Echo ordered, results pending. VS HR 70, /91, Spo2 100% on room air, Temp 97.9, RR 20. Started on heparin gtt, Cardiology [place] and [facility] [place].     Past Medical History:   Diagnosis Date    Anticoagulant long-term use     Aortic stenosis     Asthma     Benign prostatic hyperplasia with nocturia     Bilateral carotid artery stenosis 07/21/2021    US CAROTID BILATERAL 07/14/2021 IMPRESSION: Atherosclerosis with suspected stenosis greater than 50% at the right proximal ICA visually. Velocities are discordant and appear improved from prior. Atherosclerosis on the left with no evidence of flow-limiting stenosis greater than 50%.  FINDINGS:  Right: Internal Carotid Artery (ICA): Peak systolic velocity 118 cm/sec. End diastolic velocity 35 cm/sec    BPH (benign prostatic hyperplasia)     CAD (coronary artery disease)     40% lesion 2002;violet    Cirrhosis     Claudication 04/09/2014    Colon polyp     COPD (chronic obstructive pulmonary disease)     ED (erectile dysfunction)     Encounter for blood transfusion     Ex-smoker     Hearing loss NEC     Hepatitis C     Cured;reji brown 2015    Hyperlipidemia     Hypertension     Hypothyroid     s/p tx graves    Open angle with borderline findings and low glaucoma risk in both eyes 09/22/2020    DELONTE on CPAP     Osteoarthritis     Paroxysmal atrial fibrillation     PVD (peripheral vascular disease)     Secondary esophageal varices without bleeding 06/26/2017    Type 2 diabetes mellitus        Past Surgical History:   Procedure Laterality Date    ABLATION OF ARRHYTHMOGENIC FOCUS FOR ATRIAL FIBRILLATION N/A 6/24/2024    Procedure: Ablation atrial fibrillation;  Surgeon: Horacio Huerta MD;  Location: [facility] EP LAB;  Service: Cardiology;  Laterality: N/A;  afib, PVI, RFA, BERNARD (cx if SR), ZIA, anes, MB, 3 Prep, 3 hours per Dr. Huerta    ANGIOGRAM, EXTREMITY, UNILATERAL Left 1/4/2024    Procedure: ANGIOGRAM, EXTREMITY, UNILATERAL- Femoral / SMS Stent, Poss General;  Surgeon: ALEX Alfred III, MD;  Location: [facility] OR [address] [place] [address];  Service: Vascular;  Laterality: Left;  mGy : 2698.78  Gy.cm : 229.88  Contrast : 62ml  Fluro time : 13.8min    CARDIAC CATHETERIZATION      CARDIAC SURGERY      CARPAL TUNNEL RELEASE Left     CATARACT EXTRACTION      CATARACT EXTRACTION W/ INTRAOCULAR LENS  IMPLANT, BILATERAL  2008    CATHETERIZATION OF BOTH LEFT AND RIGHT HEART N/A 11/2/2018    Procedure: CATHETERIZATION, HEART, BOTH LEFT AND RIGHT;  Surgeon: Frank Glalardo MD;  Location: [facility] CATH LAB;  Service: Cardiology;  Laterality: N/A;    COLONOSCOPY  8/2013    COLONOSCOPY N/A 5/30/2016    Procedure: COLONOSCOPY;   Surgeon: Anna Tomas MD;  Location: [facility] ENDO;  Service: Endoscopy;  Laterality: N/A;    COLONOSCOPY N/A 7/17/2019    Procedure: COLONOSCOPY;  Surgeon: David Carter III, MD;  Location: [facility] ENDO;  Service: Endoscopy;  Laterality: N/A;    COLONOSCOPY N/A 12/14/2023    Procedure: COLONOSCOPY;  Surgeon: Ama Barrera MD;  Location: [facility] ENDO;  Service: Endoscopy;  Laterality: N/A;    COLONOSCOPY N/A 7/27/2024    Procedure: COLONOSCOPY;  Surgeon: Ama Barrera MD;  Location: [facility] ENDO;  Service: Endoscopy;  Laterality: N/A;    COMPUTED TOMOGRAPHY N/A 9/9/2019    Procedure: CT (COMPUTED TOMOGRAPHY);  Surgeon: [facility];  Location: [facility];  Service: General;  Laterality: N/A;  Microwave ablation to be done in CT.  Need CRNA and cart    COMPUTED TOMOGRAPHY N/A 1/25/2022    Procedure: Liver Microwave Ablation;  Surgeon: Red Puentes MD;  Location: [facility];  Service: General;  Laterality: N/A;    CORONARY ARTERY BYPASS GRAFT (CABG) N/A 11/5/2018    Procedure: CORONARY ARTERY BYPASS GRAFT (CABG);  Surgeon: Bear De Luna MD;  Location: [facility];  Service: Cardiothoracic;  Laterality: N/A;  TWO VESSEL BYPASS WITH AORTOTOMY AND EXCISION OF ATHEROSCLEROTIC PLAQUE    CORONARY BYPASS GRAFT ANGIOGRAPHY  3/2/2020    Procedure: Bypass graft study;  Surgeon: Cora Cormier MD;  Location: [facility] CATH LAB;  Service: Cardiology;;    ECHOCARDIOGRAM,TRANSESOPHAGEAL N/A 6/24/2024    Procedure: Transesophageal echo (BERNARD) intra-procedure log documentation;  Surgeon: Nacho Downs MD;  Location: [facility] EP LAB;  Service: Cardiology;  Laterality: N/A;    ELBOW BURSA SURGERY Right 2010    ENDOSCOPIC HARVEST OF VEIN Left 11/5/2018    Procedure: SURGICAL PROCUREMENT, VEIN, ENDOSCOPIC;  Surgeon: Bear De Luna MD;  Location: [facility];  Service: Cardiothoracic;  Laterality: Left;    ESOPHAGOGASTRODUODENOSCOPY N/A 7/17/2019    Procedure: ESOPHAGOGASTRODUODENOSCOPY (EGD);  Surgeon: David Carter III, MD;   Location: [facility];  Service: Endoscopy;  Laterality: N/A;    ESOPHAGOGASTRODUODENOSCOPY N/A 12/29/2022    Procedure: ESOPHAGOGASTRODUODENOSCOPY (EGD);  Surgeon: Issa Gayle MD;  Location: [facility];  Service: Endoscopy;  Laterality: N/A;    ESOPHAGOGASTRODUODENOSCOPY N/A 1/17/2024    Procedure: EGD (ESOPHAGOGASTRODUODENOSCOPY);  Surgeon: Issa Gayle MD;  Location: [facility];  Service: Endoscopy;  Laterality: N/A;    ESOPHAGOGASTRODUODENOSCOPY N/A 4/30/2024    Procedure: EGD (ESOPHAGOGASTRODUODENOSCOPY);  Surgeon: Eder Foley MD;  Location: [facility];  Service: Gastroenterology;  Laterality: N/A;    ESOPHAGOGASTRODUODENOSCOPY N/A 7/27/2024    Procedure: EGD (ESOPHAGOGASTRODUODENOSCOPY);  Surgeon: Ama Barrera MD;  Location: [facility];  Service: Endoscopy;  Laterality: N/A;    ETHMOIDECTOMY Bilateral 3/27/2019    Procedure: ETHMOIDECTOMY;  Surgeon: Alejandro Parkinson MD;  Location: [facility];  Service: ENT;  Laterality: Bilateral;    EYE SURGERY      FOOT SURGERY      FRONTAL SINUS OBLITERATION Bilateral 3/27/2019    Procedure: SINUSOTOMY, FRONTAL SINUS, OBLITERATIVE;  Surgeon: Alejandro Parkinson MD;  Location: [facility];  Service: ENT;  Laterality: Bilateral;    FUNCTIONAL ENDOSCOPIC SINUS SURGERY (FESS) Bilateral 3/27/2019    Procedure: FESS (FUNCTIONAL ENDOSCOPIC SINUS SURGERY);  Surgeon: Alejandro Parkinson MD;  Location: [facility];  Service: ENT;  Laterality: Bilateral;  Left Keila bullosa resection     INTRALUMINAL GASTROINTESTINAL TRACT IMAGING VIA CAPSULE N/A 2/2/2024    Procedure: IMAGING PROCEDURE, GI TRACT, INTRALUMINAL, VIA CAPSULE;  Surgeon: Cooper Estrella RN;  Location: [facility];  Service: Endoscopy;  Laterality: N/A;    INTRALUMINAL GASTROINTESTINAL TRACT IMAGING VIA CAPSULE N/A 7/31/2024    Procedure: IMAGING PROCEDURE, GI TRACT, INTRALUMINAL, VIA CAPSULE;  Surgeon: Cooper Estrella RN;  Location: [facility];  Service: Endoscopy;  Laterality: N/A;    KNEE ARTHROSCOPY W/ MENISCAL REPAIR Left  06/2019    dr boykin    KNEE SURGERY Right     LEFT HEART CATHETERIZATION Left 3/2/2020    Procedure: CATHETERIZATION, HEART, LEFT;  Surgeon: Cora Cormier MD;  Location: [facility] CATH LAB;  Service: Cardiology;  Laterality: Left;    LIVER BIOPSY  01/2022    MAXILLARY ANTROSTOMY Bilateral 3/27/2019    Procedure: MAXILLARY ANTROSTOMY;  Surgeon: Alejandro Parkinson MD;  Location: [facility] OR;  Service: ENT;  Laterality: Bilateral;    NASAL SEPTOPLASTY N/A 3/27/2019    Procedure: SEPTOPLASTY, NOSE;  Surgeon: Alejandro Parkinson MD;  Location: [facility] OR;  Service: ENT;  Laterality: N/A;    RECTAL SURGERY  2012    STENT, SUPERIOR MESENTERIC ARTERY Left 1/4/2024    Procedure: STENT, SUPERIOR MESENTERIC ARTERY;  Surgeon: ALEX Alfred III, MD;  Location: [address];  Service: Vascular;  Laterality: Left;    TRANSURETHRAL RESECTION OF PROSTATE (TURP) WITHOUT USE OF LASER N/A 6/19/2018    Procedure: TURP, WITHOUT USING LASER;  Surgeon: Cooper Cordova IV, MD;  Location: [facility];  Service: Urology;  Laterality: N/A;    TRIGGER FINGER RELEASE Left        Review of patient's allergies indicates:   Allergen Reactions    Lipitor [atorvastatin] Other (See Comments)     Muscle aches and pains as well as fatigue.     Niacin preparations Other (See Comments)     Causes broken blood vessels and bruises at 4 times normal dose.    Statins-hmg-coa reductase inhibitors Other (See Comments)     Statins cause intolerable myalgias.    Iodinated contrast media Hives     Tachycardia    Omeprazole Hives and Itching    Prilosec [omeprazole magnesium] Hives and Itching       Current Facility-Administered Medications on File Prior to Encounter   Medication    lactated ringers infusion     Current Outpatient Medications on File Prior to Encounter   Medication Sig    ACCU-CHEK GRACE PLUS METER Misc AS DIRECTED    albuterol (PROVENTIL HFA) 90 mcg/actuation inhaler Inhale 2 puffs into the lungs every 6 (six) hours as needed for Wheezing. Rescue    aspirin (ECOTRIN)  "81 MG EC tablet Take 1 tablet (81 mg total) by mouth once daily.    blood sugar diagnostic (ACCU-CHEK GRACE PLUS TEST STRP) Strp TEST THREE TIMES A DAY    budesonide-formoterol 160-4.5 mcg (SYMBICORT) 160-4.5 mcg/actuation HFAA INHALE 2 PUFFS INTO THE LUNGS TWO TIMES A DAY. (Patient taking differently: Inhale 2 puffs into the lungs every 12 (twelve) hours. INHALE 2 PUFFS INTO THE LUNGS TWO TIMES A DAY.)    carvediloL (COREG) 12.5 MG tablet Take 2 tablets (25 mg total) by mouth 2 (two) times daily with meals.    empagliflozin (JARDIANCE) 25 mg tablet Take 1 tablet (25 mg total) by mouth once daily.    famotidine (PEPCID) 40 MG tablet Take 40 mg by mouth once daily.    finasteride (PROSCAR) 5 mg tablet TAKE 1 TABLET BY MOUTH once daily    furosemide (LASIX) 40 MG tablet Take 1 tablet (40 mg total) by mouth 2 (two) times daily.    GLUCOSAMINE HCL/CHONDR ROSARIO A NA (OSTEO BI-FLEX ORAL) Take 1 tablet by mouth 2 (two) times daily.     krill oil 500 mg Cap Take 1 capsule by mouth Daily.    lancets (ACCU-CHEK FASTCLIX LANCET DRUM) Misc TEST TWO TIMES A DAY    LANTUS SOLOSTAR U-100 INSULIN 100 unit/mL (3 mL) InPn pen INJECT 44 UNITS UNDER THE SKIN EVERY EVENING    levothyroxine (SYNTHROID) 300 MCG Tab Take 1 tablet (300 mcg total) by mouth every morning.    lisinopriL 10 MG tablet Take 1 tablet by mouth once daily    metFORMIN (GLUCOPHAGE-XR) 500 MG ER 24hr tablet Take 1 tablet (500 mg total) by mouth 2 (two) times daily with meals.    mirabegron (MYRBETRIQ) 25 mg Tb24 ER tablet Take 1 tablet (25 mg total) by mouth once daily.    montelukast (SINGULAIR) 10 mg tablet Take 1 tablet (10 mg total) by mouth once daily.    pen needle, diabetic (BD ULTRA-FINE MINI PEN NEEDLE) 31 gauge x 3/16" Ndle USE AS DIRECTED    RABEprazole (ACIPHEX) 20 mg tablet Take 1 tablet (20 mg total) by mouth 2 (two) times daily.    ranolazine (RANEXA) 1,000 mg Tb12 Take 1 tablet (1,000 mg total) by mouth 2 (two) times daily.    REPATHA SURECLICK 140 mg/mL " PnIj INJECT 140mg subcutaneously every 14 days (Patient taking differently: Inject 140 mg into the skin every 14 (fourteen) days.)    rivaroxaban (XARELTO) 20 mg Tab Take 20 mg by mouth daily with dinner or evening meal.    inclisiran (LEQVIO) 284 mg/1.5 mL Syrg subcutaneous injection Inject 1.5 mLs (284 mg total) into the skin every 3 (three) months.    inclisiran (LEQVIO) 284 mg/1.5 mL Syrg subcutaneous injection Inject 1.5 mLs (284 mg total) into the skin every 6 (six) months.    propafenone (RHTHYMOL) 150 MG Tab Take 1 tablet (150 mg total) by mouth every 8 (eight) hours.    sildenafiL (VIAGRA) 100 MG tablet Take 1/2 to 1 tablet by mouth one hour prior to intercourse. Max 100mg per day     Family History       Problem Relation (Age of Onset)    Heart disease Mother, Father, Brother          Tobacco Use    Smoking status: Former     Current packs/day: 0.00     Average packs/day: 0.5 packs/day for 4.0 years (2.0 ttl pk-yrs)     Types: Cigarettes     Start date: 1968     Quit date: 1972     Years since quittin.2    Smokeless tobacco: Former     Types: Chew     Quit date: 1976   Substance and Sexual Activity    Alcohol use: Not Currently    Drug use: No    Sexual activity: Yes     Partners: Female     Review of Systems   Constitutional:  Positive for activity change (CP with exertion).   Respiratory:  Positive for chest tightness. Negative for shortness of breath.    Cardiovascular:  Positive for chest pain and leg swelling.   Gastrointestinal: Negative.  Negative for abdominal distention, constipation, diarrhea, nausea and vomiting.   Genitourinary: Negative.    Musculoskeletal:  Positive for arthralgias (chronic).   Skin: Negative.    Neurological: Negative.      Objective:     Vital Signs (Most Recent):  Temp: 97.9 °F (36.6 °C) (24 1048)  Pulse: 70 (24 1318)  Resp: 18 (24 1318)  BP: (!) 184/91 (24 1318)  SpO2: 100 % (24 1318) Vital Signs (24h Range):  Temp:   [97.9 °F (36.6 °C)] 97.9 °F (36.6 °C)  Pulse:  [69-74] 70  Resp:  [17-19] 18  SpO2:  [96 %-100 %] 100 %  BP: (130-184)/(60-91) 184/91     Weight: 102.7 kg (226 lb 6.6 oz)  Body mass index is 35.46 kg/m².     Physical Exam  Vitals and nursing note reviewed.   Constitutional:       General: He is not in acute distress.     Appearance: He is ill-appearing (chronically).   HENT:      Mouth/Throat:      Mouth: Mucous membranes are moist.      Pharynx: Oropharynx is clear.   Eyes:      Pupils: Pupils are equal, round, and reactive to light.   Cardiovascular:      Rate and Rhythm: Normal rate and regular rhythm.      Heart sounds: No murmur heard.  Pulmonary:      Effort: Pulmonary effort is normal.      Breath sounds: Normal breath sounds. No wheezing.   Abdominal:      General: Bowel sounds are normal. There is no distension.      Palpations: Abdomen is soft.      Tenderness: There is no abdominal tenderness.   Musculoskeletal:         General: Normal range of motion.   Skin:     General: Skin is warm and dry.   Neurological:      General: No focal deficit present.      Mental Status: He is alert and oriented to person, place, and time.              CRANIAL NERVES     CN III, IV, VI   Pupils are equal, round, and reactive to light.       Significant Labs: All pertinent labs within the past 24 hours have been reviewed.  Recent Lab Results         08/26/24  1431   08/26/24  1133   08/26/24  1047        Albumin   3.4         ALP   65         ALT   11         Anion Gap   10         PTT 31.9  Comment: Refer to local heparin nomogram for intensity/dose specific   therapeutic   range.             AST   17         Baso # 0.02   0.02         Basophil % 0.5   0.5         BILIRUBIN TOTAL   0.2  Comment: For infants and newborns, interpretation of results should be based  on gestational age, weight and in agreement with clinical  observations.    Premature Infant recommended reference ranges:  Up to 24 hours.............<8.0  mg/dL  Up to 48 hours............<12.0 mg/dL  3-5 days..................<15.0 mg/dL  6-29 days.................<15.0 mg/dL           BNP   260  Comment: Values of less than 100 pg/ml are consistent with non-CHF populations.         BUN   22         Calcium   8.5         Chloride   106         CO2   25         Creatinine   1.1         Differential Method Automated   Automated         eGFR   >60         Eos # 0.1   0.1         Eos % 2.3   2.1         Glucose   125         Gran # (ANC) 2.5   2.3         Gran % 62.1   60.3         Hematocrit 33.6   31.6         Hemoglobin 10.9   10.4         Immature Grans (Abs) 0.01  Comment: Mild elevation in immature granulocytes is non specific and   can be seen in a variety of conditions including stress response,   acute inflammation, trauma and pregnancy. Correlation with other   laboratory and clinical findings is essential.     0.02  Comment: Mild elevation in immature granulocytes is non specific and   can be seen in a variety of conditions including stress response,   acute inflammation, trauma and pregnancy. Correlation with other   laboratory and clinical findings is essential.           Immature Granulocytes 0.3   0.5         INR 1.0  Comment: Coumadin Therapy:  2.0 - 3.0 for INR for all indicators except mechanical heart valves  and antiphospholipid syndromes which should use 2.5 - 3.5.             Lymph # 0.9   0.9         Lymph % 23.3   24.0         MCH 29.9   30.3         MCHC 32.4   32.9         MCV 92   92         Mono # 0.5   0.5         Mono % 11.5   12.6         MPV 10.0   9.7         nRBC 0   0         QRS Duration     98       OHS QTC Calculation     425       Platelet Count 151   136         Potassium   3.8         PROTEIN TOTAL   6.5         PT 12.0           RBC 3.64   3.43         RDW 17.7   17.8         Sodium   141         Troponin I   0.015  Comment: The reference interval for Troponin I represents the 99th percentile   cutoff   for our facility and is  consistent with 3rd generation assay   performance.           WBC 4.00   3.88                 Significant Imaging: I have reviewed all pertinent imaging results/findings within the past 24 hours.  Assessment/Plan:     * Chest pain  -Patient presented with worsening CP over the past 2-3 days   -Patient with history of CABG who presents with exertional CP, concerning for angina  -Reported relief with IV morphine given in ED  -Initial troponin negative, continue to trend  -Will continue Beta blocker, ACE inhibitor and Ranexa   -Tele monitoring   -Cardiology consulted, Plan for [facility] tomorrow   -Patient NPO at midnight  -EKG: First degree AV block, Premature atrial complexes, Sinus rhythm   -ECHO ordered, results pending   -Heparin gtt     Paroxysmal atrial fibrillation  Patient with Paroxysmal (<7 days) atrial fibrillation which is uncontrolled currently with Beta Blocker. Patient is currently in sinus rhythm.[id number] Score: 4. Anticoagulation indicated. Anticoagulation done with heparin gtt .    GERD (gastroesophageal reflux disease)  -Continue home pepcid       Mixed hyperlipidemia  -intolerant of statin therapy     Nonrheumatic aortic valve stenosis  -Cardiology consulted   -Moderate by last TTE     Transesophageal echo (BERNARD)    Result Date: 6/24/2024    BERNARD prior to ablation. No LA/LEO thrombus visualized.    Left Ventricle: The left ventricle is normal in size. Normal wall   thickness. There is low normal systolic function with a visually estimated   ejection fraction of 50 - 55%.    Right Ventricle: Normal right ventricular cavity size. Wall thickness   is normal. Systolic function is normal.    Left Atrium: Left atrium is dilated. There is no thrombus in the left   atrial cavity.    Right Atrium: Right atrium is dilated.    Aortic Valve: The aortic valve is a trileaflet valve. There is moderate   aortic valve sclerosis. There is mild annular calcification present.   Moderately restricted motion. There is  moderate stenosis. There is trace   aortic regurgitation.    Mitral Valve: There is mild mitral annular calcification present. There   is mild regurgitation.    Tricuspid Valve: The tricuspid valve is structurally normal. There is   mild regurgitation.    Aorta: Grade 3 atherosclerosis of the descending aorta and in the   aortic arch.    Pericardium: There is no pericardial effusion.        Echo    Result Date: 6/11/2024    Left Ventricle: The left ventricle is normal in size. Increased wall   thickness. There is moderate asymmetric hypertrophy. Septal motion is   consistent with post-operative status. There is normal systolic function   with a visually estimated ejection fraction of 55 - 60%. Grade II   diastolic dysfunction.    Right Ventricle: Mild right ventricular enlargement. Wall thickness is   normal. Systolic function is mildly reduced.    Left Atrium: Left atrium is moderately dilated.    Aortic Valve: There is severe aortic valve sclerosis. Moderately   restricted motion. There is moderate stenosis. Aortic valve area by VTI is   1.18 cm². Aortic valve peak velocity is 2.84 m/s. Mean gradient is 20   mmHg. The dimensionless index is 0.36. There is mild aortic regurgitation.    Mitral Valve: There is mild regurgitation.    Tricuspid Valve: There is moderate regurgitation.    Pulmonary Artery: The estimated pulmonary artery systolic pressure is   33 mmHg.    IVC/SVC: Normal venous pressure at 3 mmHg.        Echo Saline Bubble? No    Result Date: 2/23/2024    Left Ventricle: The left ventricle is normal in size. Normal wall   thickness. There is mild concentric hypertrophy. There is normal systolic   function with a visually estimated ejection fraction of 60 - 65%. There is   normal diastolic function.    Right Ventricle: Normal right ventricular cavity size. Wall thickness   is normal. Right ventricle wall motion  is normal. Systolic function is   normal.    Left Atrium: Left atrium is severely dilated.     Aortic Valve: There is moderate to severe stenosis. Aortic valve area   by VTI is 0.96 cm². Aortic valve peak velocity is 3.76 m/s. Mean gradient   is 31 mmHg. The dimensionless index is 0.28. There is mild aortic   regurgitation.    Mitral Valve: There is mild regurgitation.    Tricuspid Valve: There is moderate regurgitation.    Pulmonary Artery: There is moderate pulmonary hypertension. The   estimated pulmonary artery systolic pressure is 58 mmHg.    IVC/SVC: Intermediate venous pressure at 8 mmHg.        Echo    Result Date: 8/28/2023    Left Ventricle: The left ventricle is normal in size. Normal wall   thickness. There is mild concentric hypertrophy. Normal wall motion. There   is normal systolic function. Ejection fraction by visual approximation is   55%. There is normal diastolic function.    Right Ventricle: Normal right ventricular cavity size. Wall thickness   is normal. Right ventricle wall motion  is normal. Systolic function is   normal.    Aortic Valve: There is moderate stenosis. Aortic valve area by VTI is   1.06 cm². Aortic valve peak velocity is 3.05 m/s. Mean gradient is 22   mmHg. The dimensionless index is 0.30. There is mild to moderate aortic   regurgitation.    Mitral Valve:  There is mild regurgitation.    Tricuspid Valve: There is mild regurgitation.    Pulmonary Artery: The estimated pulmonary artery systolic pressure is   30 mmHg.          Mild intermittent asthma without complication  -Reports he does not use inhalers daily   -will monitor pulse oxymetry  -nebs PRN         DELONTE on CPAP  -CPAP QHS       Essential hypertension  Chronic, uncontrolled. Latest blood pressure and vitals reviewed-     Temp:  [97.9 °F (36.6 °C)]   Pulse:  [69-74]   Resp:  [17-19]   BP: (130-184)/(60-91)   SpO2:  [96 %-100 %] .   Home meds for hypertension were reviewed and noted below.   Hypertension Medications               carvediloL (COREG) 12.5 MG tablet Take 2 tablets (25 mg total) by mouth 2 (two) times  daily with meals.    furosemide (LASIX) 40 MG tablet Take 1 tablet (40 mg total) by mouth 2 (two) times daily.    lisinopriL 10 MG tablet Take 1 tablet by mouth once daily            While in the hospital, will manage blood pressure as follows; Continue home antihypertensive regimen    Will utilize p.r.n. blood pressure medication only if patient's blood pressure greater than 180/110 and he develops symptoms such as worsening chest pain or shortness of breath.      VTE Risk Mitigation (From admission, onward)           Ordered     IP VTE HIGH RISK PATIENT  Once         08/26/24 1402     Place sequential compression device  Until discontinued         08/26/24 1402     Reason for No Pharmacological VTE Prophylaxis  Once        Question:  Reasons:  Answer:  Physician Provided (leave comment)  Comment:  on heparin gtt    08/26/24 1402                         On 08/26/2024, patient should be placed in hospital observation services under my care in collaboration with Dr. Hernandez.    The patient's case has been reviewed by my supervising physician.   Supervising physician will provide additional orders and recommendations at his/her discretion.  Please see supervising physicians documentation and/or orders for further details.          Danelle Smith NP  Department of Hospital Medicine  [facility] - Emergency Dept.

## 2024-08-26 NOTE — ASSESSMENT & PLAN NOTE
Patient with Paroxysmal (<7 days) atrial fibrillation which is uncontrolled currently with Beta Blocker. Patient is currently in sinus rhythm.[id number] Score: 4. Anticoagulation indicated. Anticoagulation done with heparin gtt .

## 2024-08-26 NOTE — Clinical Note
The catheter was repositioned into the ostial LIMA graft. An angiography was performed of the LIMA to LAD. The result was suboptimal..

## 2024-08-26 NOTE — ASSESSMENT & PLAN NOTE
-Cardiology consulted   -Moderate by last TTE     Transesophageal echo (BERNARD)    Result Date: 6/24/2024    BERNARD prior to ablation. No LA/LEO thrombus visualized.    Left Ventricle: The left ventricle is normal in size. Normal wall   thickness. There is low normal systolic function with a visually estimated   ejection fraction of 50 - 55%.    Right Ventricle: Normal right ventricular cavity size. Wall thickness   is normal. Systolic function is normal.    Left Atrium: Left atrium is dilated. There is no thrombus in the left   atrial cavity.    Right Atrium: Right atrium is dilated.    Aortic Valve: The aortic valve is a trileaflet valve. There is moderate   aortic valve sclerosis. There is mild annular calcification present.   Moderately restricted motion. There is moderate stenosis. There is trace   aortic regurgitation.    Mitral Valve: There is mild mitral annular calcification present. There   is mild regurgitation.    Tricuspid Valve: The tricuspid valve is structurally normal. There is   mild regurgitation.    Aorta: Grade 3 atherosclerosis of the descending aorta and in the   aortic arch.    Pericardium: There is no pericardial effusion.        Echo    Result Date: 6/11/2024    Left Ventricle: The left ventricle is normal in size. Increased wall   thickness. There is moderate asymmetric hypertrophy. Septal motion is   consistent with post-operative status. There is normal systolic function   with a visually estimated ejection fraction of 55 - 60%. Grade II   diastolic dysfunction.    Right Ventricle: Mild right ventricular enlargement. Wall thickness is   normal. Systolic function is mildly reduced.    Left Atrium: Left atrium is moderately dilated.    Aortic Valve: There is severe aortic valve sclerosis. Moderately   restricted motion. There is moderate stenosis. Aortic valve area by VTI is   1.18 cm². Aortic valve peak velocity is 2.84 m/s. Mean gradient is 20   mmHg. The dimensionless index is 0.36. There is  mild aortic regurgitation.    Mitral Valve: There is mild regurgitation.    Tricuspid Valve: There is moderate regurgitation.    Pulmonary Artery: The estimated pulmonary artery systolic pressure is   33 mmHg.    IVC/SVC: Normal venous pressure at 3 mmHg.        Echo Saline Bubble? No    Result Date: 2/23/2024    Left Ventricle: The left ventricle is normal in size. Normal wall   thickness. There is mild concentric hypertrophy. There is normal systolic   function with a visually estimated ejection fraction of 60 - 65%. There is   normal diastolic function.    Right Ventricle: Normal right ventricular cavity size. Wall thickness   is normal. Right ventricle wall motion  is normal. Systolic function is   normal.    Left Atrium: Left atrium is severely dilated.    Aortic Valve: There is moderate to severe stenosis. Aortic valve area   by VTI is 0.96 cm². Aortic valve peak velocity is 3.76 m/s. Mean gradient   is 31 mmHg. The dimensionless index is 0.28. There is mild aortic   regurgitation.    Mitral Valve: There is mild regurgitation.    Tricuspid Valve: There is moderate regurgitation.    Pulmonary Artery: There is moderate pulmonary hypertension. The   estimated pulmonary artery systolic pressure is 58 mmHg.    IVC/SVC: Intermediate venous pressure at 8 mmHg.        Echo    Result Date: 8/28/2023    Left Ventricle: The left ventricle is normal in size. Normal wall   thickness. There is mild concentric hypertrophy. Normal wall motion. There   is normal systolic function. Ejection fraction by visual approximation is   55%. There is normal diastolic function.    Right Ventricle: Normal right ventricular cavity size. Wall thickness   is normal. Right ventricle wall motion  is normal. Systolic function is   normal.    Aortic Valve: There is moderate stenosis. Aortic valve area by VTI is   1.06 cm². Aortic valve peak velocity is 3.05 m/s. Mean gradient is 22   mmHg. The dimensionless index is 0.30. There is mild to  moderate aortic   regurgitation.    Mitral Valve:  There is mild regurgitation.    Tricuspid Valve: There is mild regurgitation.    Pulmonary Artery: The estimated pulmonary artery systolic pressure is   30 mmHg.

## 2024-08-27 DIAGNOSIS — I35.0 NONRHEUMATIC AORTIC VALVE STENOSIS: Primary | ICD-10-CM

## 2024-08-27 DIAGNOSIS — J45.20 MILD INTERMITTENT ASTHMA WITHOUT COMPLICATION: Primary | ICD-10-CM

## 2024-08-27 LAB
ALBUMIN SERPL BCP-MCNC: 3.3 G/DL (ref 3.5–5.2)
ALLENS TEST: ABNORMAL
ALP SERPL-CCNC: 55 U/L (ref 55–135)
ALT SERPL W/O P-5'-P-CCNC: 14 U/L (ref 10–44)
ANION GAP SERPL CALC-SCNC: 10 MMOL/L (ref 8–16)
AST SERPL-CCNC: 15 U/L (ref 10–40)
BASOPHILS # BLD AUTO: 0.01 K/UL (ref 0–0.2)
BASOPHILS NFR BLD: 0.2 % (ref 0–1.9)
BILIRUB SERPL-MCNC: 0.2 MG/DL (ref 0.1–1)
BUN SERPL-MCNC: 20 MG/DL (ref 8–23)
CALCIUM SERPL-MCNC: 8.7 MG/DL (ref 8.7–10.5)
CHLORIDE SERPL-SCNC: 103 MMOL/L (ref 95–110)
CHOLEST SERPL-MCNC: 115 MG/DL (ref 120–199)
CHOLEST/HDLC SERPL: 2.4 {RATIO} (ref 2–5)
CO2 SERPL-SCNC: 26 MMOL/L (ref 23–29)
CREAT SERPL-MCNC: 1.2 MG/DL (ref 0.5–1.4)
DELSYS: ABNORMAL
DIFFERENTIAL METHOD BLD: ABNORMAL
EOSINOPHIL # BLD AUTO: 0 K/UL (ref 0–0.5)
EOSINOPHIL NFR BLD: 0 % (ref 0–8)
ERYTHROCYTE [DISTWIDTH] IN BLOOD BY AUTOMATED COUNT: 17.8 % (ref 11.5–14.5)
EST. GFR  (NO RACE VARIABLE): >60 ML/MIN/1.73 M^2
ESTIMATED AVG GLUCOSE: 103 MG/DL (ref 68–131)
GLUCOSE SERPL-MCNC: 197 MG/DL (ref 70–110)
HBA1C MFR BLD: 5.2 % (ref 4–5.6)
HCO3 UR-SCNC: 26.7 MMOL/L (ref 24–28)
HCT VFR BLD AUTO: 32.8 % (ref 40–54)
HDLC SERPL-MCNC: 47 MG/DL (ref 40–75)
HDLC SERPL: 40.9 % (ref 20–50)
HGB BLD-MCNC: 10.5 G/DL (ref 14–18)
IMM GRANULOCYTES # BLD AUTO: 0.01 K/UL (ref 0–0.04)
IMM GRANULOCYTES NFR BLD AUTO: 0.2 % (ref 0–0.5)
LDLC SERPL CALC-MCNC: 49.4 MG/DL (ref 63–159)
LYMPHOCYTES # BLD AUTO: 0.4 K/UL (ref 1–4.8)
LYMPHOCYTES NFR BLD: 8.7 % (ref 18–48)
MCH RBC QN AUTO: 29.8 PG (ref 27–31)
MCHC RBC AUTO-ENTMCNC: 32 G/DL (ref 32–36)
MCV RBC AUTO: 93 FL (ref 82–98)
MONOCYTES # BLD AUTO: 0.1 K/UL (ref 0.3–1)
MONOCYTES NFR BLD: 1.3 % (ref 4–15)
NEUTROPHILS # BLD AUTO: 4 K/UL (ref 1.8–7.7)
NEUTROPHILS NFR BLD: 89.6 % (ref 38–73)
NONHDLC SERPL-MCNC: 68 MG/DL
NRBC BLD-RTO: 0 /100 WBC
OHS QRS DURATION: 96 MS
OHS QTC CALCULATION: 423 MS
PCO2 BLDA: 44.7 MMHG (ref 35–45)
PH SMN: 7.38 [PH] (ref 7.35–7.45)
PLATELET # BLD AUTO: 135 K/UL (ref 150–450)
PMV BLD AUTO: 10 FL (ref 9.2–12.9)
PO2 BLDA: 34 MMHG (ref 40–60)
POC BE: 2 MMOL/L
POC SATURATED O2: 65 % (ref 95–100)
POCT GLUCOSE: 168 MG/DL (ref 70–110)
POCT GLUCOSE: 176 MG/DL (ref 70–110)
POTASSIUM SERPL-SCNC: 4.2 MMOL/L (ref 3.5–5.1)
PROT SERPL-MCNC: 6.4 G/DL (ref 6–8.4)
RBC # BLD AUTO: 3.52 M/UL (ref 4.6–6.2)
SAMPLE: ABNORMAL
SITE: ABNORMAL
SODIUM SERPL-SCNC: 139 MMOL/L (ref 136–145)
TRIGL SERPL-MCNC: 93 MG/DL (ref 30–150)
TSH SERPL DL<=0.005 MIU/L-ACNC: 1.89 UIU/ML (ref 0.4–4)
WBC # BLD AUTO: 4.48 K/UL (ref 3.9–12.7)

## 2024-08-27 PROCEDURE — 85025 COMPLETE CBC W/AUTO DIFF WBC: CPT | Performed by: PHYSICIAN ASSISTANT

## 2024-08-27 PROCEDURE — 99152 MOD SED SAME PHYS/QHP 5/>YRS: CPT | Mod: ,,, | Performed by: INTERNAL MEDICINE

## 2024-08-27 PROCEDURE — C1894 INTRO/SHEATH, NON-LASER: HCPCS | Performed by: INTERNAL MEDICINE

## 2024-08-27 PROCEDURE — 63600175 PHARM REV CODE 636 W HCPCS: Performed by: INTERNAL MEDICINE

## 2024-08-27 PROCEDURE — 25000003 PHARM REV CODE 250: Performed by: PHYSICIAN ASSISTANT

## 2024-08-27 PROCEDURE — 82803 BLOOD GASES ANY COMBINATION: CPT

## 2024-08-27 PROCEDURE — 99153 MOD SED SAME PHYS/QHP EA: CPT | Performed by: INTERNAL MEDICINE

## 2024-08-27 PROCEDURE — 93461 R&L HRT ART/VENTRICLE ANGIO: CPT | Performed by: INTERNAL MEDICINE

## 2024-08-27 PROCEDURE — 25000003 PHARM REV CODE 250: Performed by: INTERNAL MEDICINE

## 2024-08-27 PROCEDURE — 84443 ASSAY THYROID STIM HORMONE: CPT

## 2024-08-27 PROCEDURE — C1751 CATH, INF, PER/CENT/MIDLINE: HCPCS | Performed by: INTERNAL MEDICINE

## 2024-08-27 PROCEDURE — 80053 COMPREHEN METABOLIC PANEL: CPT

## 2024-08-27 PROCEDURE — 80061 LIPID PANEL: CPT

## 2024-08-27 PROCEDURE — 93461 R&L HRT ART/VENTRICLE ANGIO: CPT | Mod: 26,,, | Performed by: INTERNAL MEDICINE

## 2024-08-27 PROCEDURE — 99214 OFFICE O/P EST MOD 30 MIN: CPT | Mod: 25,,, | Performed by: INTERNAL MEDICINE

## 2024-08-27 PROCEDURE — 36415 COLL VENOUS BLD VENIPUNCTURE: CPT | Performed by: PHYSICIAN ASSISTANT

## 2024-08-27 PROCEDURE — C1887 CATHETER, GUIDING: HCPCS | Performed by: INTERNAL MEDICINE

## 2024-08-27 PROCEDURE — 99152 MOD SED SAME PHYS/QHP 5/>YRS: CPT | Performed by: INTERNAL MEDICINE

## 2024-08-27 PROCEDURE — 25000003 PHARM REV CODE 250

## 2024-08-27 PROCEDURE — G0378 HOSPITAL OBSERVATION PER HR: HCPCS

## 2024-08-27 PROCEDURE — 25500020 PHARM REV CODE 255: Performed by: INTERNAL MEDICINE

## 2024-08-27 PROCEDURE — 99900035 HC TECH TIME PER 15 MIN (STAT)

## 2024-08-27 PROCEDURE — 83036 HEMOGLOBIN GLYCOSYLATED A1C: CPT

## 2024-08-27 PROCEDURE — C1769 GUIDE WIRE: HCPCS | Performed by: INTERNAL MEDICINE

## 2024-08-27 RX ORDER — LIDOCAINE HYDROCHLORIDE 20 MG/ML
INJECTION, SOLUTION INFILTRATION; PERINEURAL
Status: DISCONTINUED | OUTPATIENT
Start: 2024-08-27 | End: 2024-08-27 | Stop reason: HOSPADM

## 2024-08-27 RX ORDER — ONDANSETRON 8 MG/1
8 TABLET, ORALLY DISINTEGRATING ORAL EVERY 8 HOURS PRN
Status: DISCONTINUED | OUTPATIENT
Start: 2024-08-27 | End: 2024-08-27 | Stop reason: HOSPADM

## 2024-08-27 RX ORDER — SODIUM CHLORIDE 9 MG/ML
INJECTION, SOLUTION INTRAVENOUS
Status: DISCONTINUED | OUTPATIENT
Start: 2024-08-27 | End: 2024-08-27 | Stop reason: HOSPADM

## 2024-08-27 RX ORDER — FENTANYL CITRATE 50 UG/ML
INJECTION, SOLUTION INTRAMUSCULAR; INTRAVENOUS
Status: DISCONTINUED | OUTPATIENT
Start: 2024-08-27 | End: 2024-08-27 | Stop reason: HOSPADM

## 2024-08-27 RX ORDER — DIPHENHYDRAMINE HYDROCHLORIDE 50 MG/ML
INJECTION INTRAMUSCULAR; INTRAVENOUS
Status: DISCONTINUED | OUTPATIENT
Start: 2024-08-27 | End: 2024-08-27 | Stop reason: HOSPADM

## 2024-08-27 RX ORDER — NITROGLYCERIN 5 MG/ML
INJECTION, SOLUTION INTRAVENOUS
Status: DISCONTINUED | OUTPATIENT
Start: 2024-08-27 | End: 2024-08-27 | Stop reason: HOSPADM

## 2024-08-27 RX ORDER — HEPARIN SODIUM 1000 [USP'U]/ML
INJECTION, SOLUTION INTRAVENOUS; SUBCUTANEOUS
Status: DISCONTINUED | OUTPATIENT
Start: 2024-08-27 | End: 2024-08-27 | Stop reason: HOSPADM

## 2024-08-27 RX ORDER — CARVEDILOL 3.12 MG/1
3.12 TABLET ORAL 2 TIMES DAILY WITH MEALS
Qty: 60 TABLET | Refills: 0 | Status: SHIPPED | OUTPATIENT
Start: 2024-08-28 | End: 2024-09-27

## 2024-08-27 RX ORDER — SODIUM CHLORIDE 9 MG/ML
INJECTION, SOLUTION INTRAVENOUS CONTINUOUS
Status: ACTIVE | OUTPATIENT
Start: 2024-08-27 | End: 2024-08-27

## 2024-08-27 RX ORDER — AMLODIPINE BESYLATE 5 MG/1
5 TABLET ORAL DAILY
Qty: 30 TABLET | Refills: 0 | Status: SHIPPED | OUTPATIENT
Start: 2024-08-28 | End: 2024-09-27

## 2024-08-27 RX ORDER — MIDAZOLAM HYDROCHLORIDE 1 MG/ML
INJECTION, SOLUTION INTRAMUSCULAR; INTRAVENOUS
Status: DISCONTINUED | OUTPATIENT
Start: 2024-08-27 | End: 2024-08-27 | Stop reason: HOSPADM

## 2024-08-27 RX ORDER — ISOSORBIDE MONONITRATE 30 MG/1
30 TABLET, EXTENDED RELEASE ORAL DAILY
Qty: 30 TABLET | Refills: 0 | Status: SHIPPED | OUTPATIENT
Start: 2024-08-28 | End: 2024-09-27

## 2024-08-27 RX ORDER — ACETAMINOPHEN 325 MG/1
650 TABLET ORAL EVERY 4 HOURS PRN
Status: DISCONTINUED | OUTPATIENT
Start: 2024-08-27 | End: 2024-08-27 | Stop reason: HOSPADM

## 2024-08-27 RX ADMIN — LEVOTHYROXINE SODIUM 300 MCG: 150 TABLET ORAL at 06:08

## 2024-08-27 RX ADMIN — FINASTERIDE 5 MG: 5 TABLET, FILM COATED ORAL at 08:08

## 2024-08-27 RX ADMIN — CARVEDILOL 3.12 MG: 3.12 TABLET, FILM COATED ORAL at 04:08

## 2024-08-27 RX ADMIN — DIPHENHYDRAMINE HYDROCHLORIDE 50 MG: 50 CAPSULE ORAL at 08:08

## 2024-08-27 RX ADMIN — AMLODIPINE BESYLATE 5 MG: 5 TABLET ORAL at 08:08

## 2024-08-27 RX ADMIN — RANOLAZINE 1000 MG: 500 TABLET, EXTENDED RELEASE ORAL at 08:08

## 2024-08-27 RX ADMIN — LISINOPRIL 10 MG: 10 TABLET ORAL at 08:08

## 2024-08-27 RX ADMIN — PREDNISONE 50 MG: 20 TABLET ORAL at 08:08

## 2024-08-27 RX ADMIN — CARVEDILOL 3.12 MG: 3.12 TABLET, FILM COATED ORAL at 07:08

## 2024-08-27 RX ADMIN — ISOSORBIDE MONONITRATE 30 MG: 30 TABLET, EXTENDED RELEASE ORAL at 08:08

## 2024-08-27 RX ADMIN — PREDNISONE 50 MG: 20 TABLET ORAL at 04:08

## 2024-08-27 RX ADMIN — FAMOTIDINE 40 MG: 20 TABLET ORAL at 08:08

## 2024-08-27 NOTE — SUBJECTIVE & OBJECTIVE
Review of Systems   Cardiovascular:  Positive for chest pain.     Objective:     Vital Signs (Most Recent):  Temp: 98.3 °F (36.8 °C) (08/27/24 0732)  Pulse: 74 (08/27/24 0904)  Resp: 18 (08/27/24 0732)  BP: (!) 154/72 (08/27/24 0732)  SpO2: 97 % (08/27/24 0732) Vital Signs (24h Range):  Temp:  [97.9 °F (36.6 °C)-98.4 °F (36.9 °C)] 98.3 °F (36.8 °C)  Pulse:  [61-81] 74  Resp:  [17-20] 18  SpO2:  [96 %-100 %] 97 %  BP: (103-184)/(53-91) 154/72     Weight: 101.1 kg (222 lb 14.2 oz)  Body mass index is 34.91 kg/m².     SpO2: 97 %       No intake or output data in the 24 hours ending 08/27/24 1059    Lines/Drains/Airways       Peripheral Intravenous Line  Duration                  Peripheral IV - Single Lumen 08/26/24 1129 20 G Left Antecubital <1 day                       Physical Exam  Vitals reviewed.   Constitutional:       Appearance: He is well-developed.   Neck:      Vascular: No carotid bruit.   Cardiovascular:      Rate and Rhythm: Normal rate and regular rhythm.      Pulses: Intact distal pulses.      Heart sounds: Normal heart sounds. No murmur heard.  Pulmonary:      Breath sounds: Normal breath sounds.   Neurological:      Mental Status: He is oriented to person, place, and time.            Significant Labs: All pertinent lab results from the last 24 hours have been reviewed. and   Recent Lab Results  (Last 5 results in the past 24 hours)        08/27/24  0546   08/27/24  0458   08/26/24 2009 08/26/24 2005 08/26/24  1627        Albumin   3.3             ALP   55             ALT   14             Anion Gap   10             AST   15             Baso #   0.01             Basophil %   0.2             BILIRUBIN TOTAL   0.2  Comment: For infants and newborns, interpretation of results should be based  on gestational age, weight and in agreement with clinical  observations.    Premature Infant recommended reference ranges:  Up to 24 hours.............<8.0 mg/dL  Up to 48 hours............<12.0 mg/dL  3-5  days..................<15.0 mg/dL  6-29 days.................<15.0 mg/dL               BUN   20             Calcium   8.7             Chloride   103             CO2   26             Creatinine   1.2             Differential Method   Automated             eGFR   >60             Eos #   0.0             Eos %   0.0             Glucose   197             Gran # (ANC)   4.0             Gran %   89.6             Hematocrit   32.8             Hemoglobin   10.5             Immature Grans (Abs)   0.01  Comment: Mild elevation in immature granulocytes is non specific and   can be seen in a variety of conditions including stress response,   acute inflammation, trauma and pregnancy. Correlation with other   laboratory and clinical findings is essential.               Immature Granulocytes   0.2             Lymph #   0.4             Lymph %   8.7             MCH   29.8             MCHC   32.0             MCV   93             Mono #   0.1             Mono %   1.3             MPV   10.0             nRBC   0             Platelet Count   135             POCT Glucose 176     190           Potassium   4.2             PROTEIN TOTAL   6.4             RBC   3.52             RDW   17.8             Sodium   139             Troponin I       <0.006  Comment: The reference interval for Troponin I represents the 99th percentile   cutoff   for our facility and is consistent with 3rd generation assay   performance.     0.012  Comment: The reference interval for Troponin I represents the 99th percentile   cutoff   for our facility and is consistent with 3rd generation assay   performance.         TSH   1.890             WBC   4.48                                    Significant Imaging: Echocardiogram: Transthoracic echo (TTE) complete (Cupid Only):   Results for orders placed or performed during the hospital encounter of 08/26/24   Echo   Result Value Ref Range    BSA 2.2 m2    LA WIDTH 4.4 cm    RA Width 3.3 cm    LVOT stroke volume 77.24 cm3     LVIDd 4.10 3.5 - 6.0 cm    LV Systolic Volume 35.25 mL    LV Systolic Volume Index 16.5 mL/m2    LVIDs 3.01 2.1 - 4.0 cm    LV Diastolic Volume 74.04 mL    LV Diastolic Volume Index 34.76 mL/m2    Left Ventricular End Systolic Volume by Teichholz Method 35.25 mL    Left Ventricular End Diastolic Volume by Teichholz Method 74.04 mL    IVS 1.58 (A) 0.6 - 1.1 cm    LVOT diameter 2.00 cm    LVOT area 3.1 cm2    FS 27 (A) 28 - 44 %    Left Ventricle Relative Wall Thickness 0.64 cm    Posterior Wall 1.31 (A) 0.6 - 1.1 cm    LV mass 227.41 g    LV Mass Index 107 g/m2    MV Peak E Delbert 1.21 m/s    TDI LATERAL 0.12 m/s    TDI SEPTAL 0.06 m/s    E/E' ratio 13.44 m/s    MV Peak A Delbert 0.98 m/s    TR Max Delbert 2.68 m/s    E/A ratio 1.23     IVRT 51.38 msec    E wave deceleration time 358.21 msec    LV SEPTAL E/E' RATIO 20.17 m/s    LA Volume Index 48.9 mL/m2    LV LATERAL E/E' RATIO 10.08 m/s    LA volume 104.24 cm3    LVOT peak delbert 1.12 m/s    Left Ventricular Outflow Tract Mean Velocity 0.89 cm/s    Left Ventricular Outflow Tract Mean Gradient 3.30 mmHg    RVOT peak VTI 16.5 cm    TAPSE 1.55 cm    RV/LV Ratio 0.68 cm    LA size 4.84 cm    Left Atrium Minor Axis 5.85 cm    Left Atrium Major Axis 5.67 cm    RA Major Axis 4.73 cm    AV mean gradient 34 mmHg    AV peak gradient 54 mmHg    Ao peak delbert 3.67 m/s    Ao VTI 92.50 cm    LVOT peak VTI 24.60 cm    AV valve area 0.84 cm²    AV Velocity Ratio 0.31     AV index (prosthetic) 0.27     RADHA by Velocity Ratio 0.96 cm²    Mr max delbert 4.58 m/s    MV mean gradient 3 mmHg    MV peak gradient 9 mmHg    MV stenosis pressure 1/2 time 103.88 ms    MV valve area p 1/2 method 2.12 cm2    MV valve area by continuity eq 1.89 cm2    MV VTI 40.8 cm    TV mean gradient 24 mmHg    Triscuspid Valve Regurgitation Peak Gradient 29 mmHg    PV mean gradient 2 mmHg    RVOT peak delbert 0.88 m/s    Ao root annulus 3.41 cm    STJ 3.50 cm    Ascending aorta 3.41 cm    IVC diameter 2.05 cm    Mean e' 0.09 m/s     ZLVIDS -2.52     ZLVIDD -5.04     RVDD 2.80 cm    TV resting pulmonary artery pressure 37 mmHg    RV TB RVSP 11 mmHg    Est. RA pres 8 mmHg    Narrative      Left Ventricle: The left ventricle is normal in size. Normal wall   thickness. Septal motion is consistent with post-operative status. There   is normal systolic function with a visually estimated ejection fraction of   60 - 65%. Grade II diastolic dysfunction.    Right Ventricle: Normal right ventricular cavity size. Wall thickness   is normal. Systolic function is normal.    Left Atrium: Left atrium is severely dilated.    Aortic Valve: There is severe aortic valve sclerosis. Severely   restricted motion. There is moderate to severe stenosis. Aortic valve area   by VTI is 0.84 cm². Aortic valve peak velocity is 3.67 m/s. Mean gradient   is 34 mmHg. The dimensionless index is 0.27.    Mitral Valve: There is mild regurgitation.    Tricuspid Valve: There is mild regurgitation.    Pulmonary Artery: The estimated pulmonary artery systolic pressure is   37 mmHg.    IVC/SVC: Intermediate venous pressure at 8 mmHg.

## 2024-08-27 NOTE — BRIEF OP NOTE
[facility] - Cath Lab ([facility])  Brief Operative Note  Cardiology    SUMMARY     Surgery Date: 8/27/2024     Surgeons and Role:     * Stanton Jefferson MD - Primary    Assisting Surgeon: None    Pre-op Diagnosis:  Unstable angina pectoris [I20.0]  S/P CABG (coronary artery bypass graft) [Z95.1]  CAD, multiple vessel [I25.10]    Post-op Diagnosis: Post-Op Diagnosis Codes:     * Unstable angina pectoris [I20.0]     * S/P CABG (coronary artery bypass graft) [Z95.1]     * CAD, multiple vessel [I25.10]    Procedure Performed:     Procedure(s) (LRB):  Angiogram, Coronary, with Left Heart Cath (N/A)  Bypass graft study  ARTERIOGRAM, SUBCLAVIAN  INSERTION, CATHETER, RIGHT HEART (Right)  Valve study-aortic    Technical Procedures Used:     Operative Findings:   Results for orders placed during the hospital encounter of 08/26/24    Cardiac catheterization    Conclusion    Patent LIMA TO LAD and SVG OM2. Non obstructive disease of RCA unchanged from the past.    The RPAV lesion was 50% stenosed.    The Mid LM to Dist LM lesion was 80% stenosed.    The Dist LM to Ost LAD lesion was 90% stenosed.    The Ost Cx to Prox Cx lesion was 90% stenosed.    The Mid Cx lesion was 100% stenosed.    The pre-procedure left ventricular end diastolic pressure was 16.    The estimated blood loss was none.    There was two vessel coronary artery disease.    The filling pressures mildly elevated.    There was severe aortic valve stenosis.    RADHA by Hakki equation was 1.02 cm2, pullback gradient of 38 mmhg, CO 6.36. Follow up/evaluate with structural cardiology.    The procedure log was documented by Documenter: Marlene Jung RN and verified by Stanton Jefferson MD.    Date: 8/27/2024  Time: 12:49 PM      Estimated Blood Loss: * No values recorded between 8/27/2024 11:56 AM and 8/27/2024 12:48 PM *         Specimens:   Specimen (24h ago, onward)      None

## 2024-08-27 NOTE — PLAN OF CARE
[facility] - Telemetry (Hospital)  Discharge Final Note    Primary Care Provider: Bhupinder Callaway MD    Expected Discharge Date: 8/27/2024    Final Discharge Note (most recent)       Final Note - 08/27/24 1554          Final Note    Assessment Type Final Discharge Note     Anticipated Discharge Disposition Home or Self Care     Hospital Resources/Appts/Education Provided Appointments scheduled and added to AVS        Post-Acute Status    Discharge Delays None known at this time                     Important Message from Medicare             Contact Info       Bhupinder Callaway MD   Specialty: Internal Medicine, Pediatrics   Relationship: PCP - General    [address] [place] [address]  [place] [id number]   Phone: [phone]       Next Steps: Follow up in 3 day(s)    Frank Gallardo MD   Specialty: Interventional Cardiology, Cardiology    [address] [place] [address] [address] [place] [address]   Phone: [phone]       Next Steps: Follow up in 1 week(s)          DC Disposition: home with family  Family Notified: Patient at bedside  Transportation: personal transportation    Patient had no equipment or placement needs from .     Patient has PCP hospital follow up with Kayleigh Paulino NP, on 8/29/2024 at 1:20 pm.

## 2024-08-27 NOTE — PLAN OF CARE
[facility] - Cath Lab (Hospital)  Discharge Assessment    Primary Care Provider: No, Primary Doctor     Discharge Assessment (most recent)       BRIEF DISCHARGE ASSESSMENT - 08/27/24 1144          Discharge Planning    Assessment Type Discharge Planning Brief Assessment     Resource/Environmental Concerns none     Support Systems Children     Equipment Currently Used at Home none     Current Living Arrangements home     Patient/Family Anticipates Transition to home with family     Patient/Family Anticipated Services at Transition none     DME Needed Upon Discharge  none     Discharge Plan A Home with family                   SW performed chart review for brief assessment. Patient lives alone and does not use any home equipment at this time.  SW will continue to follow and assist as needed.

## 2024-08-27 NOTE — INTERVAL H&P NOTE
The patient has been examined and the H&P has been reviewed:    I concur with the findings and no changes have occurred since H&P was written.    Anesthesia/Surgery risks, benefits and alternative options discussed and understood by patient/family.          Active Hospital Problems    Diagnosis  POA    *Chest pain [R07.9]  Yes    Paroxysmal atrial fibrillation [I48.0]  Yes    GERD (gastroesophageal reflux disease) [K21.9]  Yes    Mixed hyperlipidemia [E78.2]  Yes    Nonrheumatic aortic valve stenosis [I35.0]  Yes    Mild intermittent asthma without complication [J45.20]  Yes     Symbicort 160 mcg 2 puffs twice daily  Albuterol inhaler       DELONTE on CPAP [G47.33]  Yes     Chronic      CPAP 11 cm with optimal control AHI: 2.6  Nasal wisp mask  HME: [facility]         Essential hypertension [I10]  Yes     Chronic      Resolved Hospital Problems    Diagnosis Date Resolved POA    Bilateral carotid artery stenosis [I65.23] 08/26/2024 Yes     Chronic     US CAROTID BILATERAL 07/14/2021  IMPRESSION: Atherosclerosis with suspected stenosis greater than 50% at the right proximal ICA visually. Velocities are discordant and appear improved from prior. Atherosclerosis on the left with no evidence of flow-limiting stenosis greater than 50%.   FINDINGS: Right: Internal Carotid Artery (ICA): Peak systolic velocity 118 cm/sec. End diastolic velocity 35 cm/sec. ICA/CCA peak systolic ratio: 1.4. ICA/CCA end diastolic ratio: 1.7. Plaque formation: Homogeneous plaque again noted with greater than 50% luminal narrowing visually. Vertebral artery: Antegrade flow and normal waveform. Left: Internal Carotid Artery (ICA): Peak systolic velocity 85 cm/sec. End diastolic velocity 17 cm/sec. ICA/CCA peak systolic ratio: 1.0. ICA/CCA end diastolic ratio: 0.7. Plaque formation: Homogeneous. Vertebral artery: Antegrade flow and normal waveform.      PVD (peripheral vascular disease) [I73.9] 08/26/2024 Yes

## 2024-08-27 NOTE — PROGRESS NOTES
[facility] - Telemetry ([facility])  Cardiology  Progress Note    Patient Name: Erendira Pretty  MRN: [id number]  Admission Date: 8/26/2024  Hospital Length of Stay: 0 days  Code Status: Full Code   Attending Physician: Tima Hernandez MD   Primary Care Physician: Dora, Primary Doctor  Expected Discharge Date:   Principal Problem:Chest pain    Subjective:     Hospital Course:   8.27.2024  Still complains of angina with exertion , while walking in the [place]        Review of Systems   Cardiovascular:  Positive for chest pain.     Objective:     Vital Signs (Most Recent):  Temp: 98.3 °F (36.8 °C) (08/27/24 0732)  Pulse: 74 (08/27/24 0904)  Resp: 18 (08/27/24 0732)  BP: (!) 154/72 (08/27/24 0732)  SpO2: 97 % (08/27/24 0732) Vital Signs (24h Range):  Temp:  [97.9 °F (36.6 °C)-98.4 °F (36.9 °C)] 98.3 °F (36.8 °C)  Pulse:  [61-81] 74  Resp:  [17-20] 18  SpO2:  [96 %-100 %] 97 %  BP: (103-184)/(53-91) 154/72     Weight: 101.1 kg (222 lb 14.2 oz)  Body mass index is 34.91 kg/m².     SpO2: 97 %       No intake or output data in the 24 hours ending 08/27/24 1059    Lines/Drains/Airways       Peripheral Intravenous Line  Duration                  Peripheral IV - Single Lumen 08/26/24 1129 20 G Left Antecubital <1 day                       Physical Exam  Vitals reviewed.   Constitutional:       Appearance: He is well-developed.   Neck:      Vascular: No carotid bruit.   Cardiovascular:      Rate and Rhythm: Normal rate and regular rhythm.      Pulses: Intact distal pulses.      Heart sounds: Normal heart sounds. No murmur heard.  Pulmonary:      Breath sounds: Normal breath sounds.   Neurological:      Mental Status: He is oriented to person, place, and time.            Significant Labs: All pertinent lab results from the last 24 hours have been reviewed. and   Recent Lab Results  (Last 5 results in the past 24 hours)        08/27/24  0546   08/27/24  0458   08/26/24 2009 08/26/24 2005 08/26/24  1627        Albumin   3.3             ALP    55             ALT   14             Anion Gap   10             AST   15             Baso #   0.01             Basophil %   0.2             BILIRUBIN TOTAL   0.2  Comment: For infants and newborns, interpretation of results should be based  on gestational age, weight and in agreement with clinical  observations.    Premature Infant recommended reference ranges:  Up to 24 hours.............<8.0 mg/dL  Up to 48 hours............<12.0 mg/dL  3-5 days..................<15.0 mg/dL  6-29 days.................<15.0 mg/dL               BUN   20             Calcium   8.7             Chloride   103             CO2   26             Creatinine   1.2             Differential Method   Automated             eGFR   >60             Eos #   0.0             Eos %   0.0             Glucose   197             Gran # (ANC)   4.0             Gran %   89.6             Hematocrit   32.8             Hemoglobin   10.5             Immature Grans (Abs)   0.01  Comment: Mild elevation in immature granulocytes is non specific and   can be seen in a variety of conditions including stress response,   acute inflammation, trauma and pregnancy. Correlation with other   laboratory and clinical findings is essential.               Immature Granulocytes   0.2             Lymph #   0.4             Lymph %   8.7             MCH   29.8             MCHC   32.0             MCV   93             Mono #   0.1             Mono %   1.3             MPV   10.0             nRBC   0             Platelet Count   135             POCT Glucose 176     190           Potassium   4.2             PROTEIN TOTAL   6.4             RBC   3.52             RDW   17.8             Sodium   139             Troponin I       <0.006  Comment: The reference interval for Troponin I represents the 99th percentile   cutoff   for our facility and is consistent with 3rd generation assay   performance.     0.012  Comment: The reference interval for Troponin I represents the 99th percentile    cutoff   for our facility and is consistent with 3rd generation assay   performance.         TSH   1.890             WBC   4.48                                    Significant Imaging: Echocardiogram: Transthoracic echo (TTE) complete (Cupid Only):   Results for orders placed or performed during the hospital encounter of 08/26/24   Echo   Result Value Ref Range    BSA 2.2 m2    LA WIDTH 4.4 cm    RA Width 3.3 cm    LVOT stroke volume 77.24 cm3    LVIDd 4.10 3.5 - 6.0 cm    LV Systolic Volume 35.25 mL    LV Systolic Volume Index 16.5 mL/m2    LVIDs 3.01 2.1 - 4.0 cm    LV Diastolic Volume 74.04 mL    LV Diastolic Volume Index 34.76 mL/m2    Left Ventricular End Systolic Volume by Teichholz Method 35.25 mL    Left Ventricular End Diastolic Volume by Teichholz Method 74.04 mL    IVS 1.58 (A) 0.6 - 1.1 cm    LVOT diameter 2.00 cm    LVOT area 3.1 cm2    FS 27 (A) 28 - 44 %    Left Ventricle Relative Wall Thickness 0.64 cm    Posterior Wall 1.31 (A) 0.6 - 1.1 cm    LV mass 227.41 g    LV Mass Index 107 g/m2    MV Peak E Delbert 1.21 m/s    TDI LATERAL 0.12 m/s    TDI SEPTAL 0.06 m/s    E/E' ratio 13.44 m/s    MV Peak A Delbert 0.98 m/s    TR Max Delbert 2.68 m/s    E/A ratio 1.23     IVRT 51.38 msec    E wave deceleration time 358.21 msec    LV SEPTAL E/E' RATIO 20.17 m/s    LA Volume Index 48.9 mL/m2    LV LATERAL E/E' RATIO 10.08 m/s    LA volume 104.24 cm3    LVOT peak delbert 1.12 m/s    Left Ventricular Outflow Tract Mean Velocity 0.89 cm/s    Left Ventricular Outflow Tract Mean Gradient 3.30 mmHg    RVOT peak VTI 16.5 cm    TAPSE 1.55 cm    RV/LV Ratio 0.68 cm    LA size 4.84 cm    Left Atrium Minor Axis 5.85 cm    Left Atrium Major Axis 5.67 cm    RA Major Axis 4.73 cm    AV mean gradient 34 mmHg    AV peak gradient 54 mmHg    Ao peak delbert 3.67 m/s    Ao VTI 92.50 cm    LVOT peak VTI 24.60 cm    AV valve area 0.84 cm²    AV Velocity Ratio 0.31     AV index (prosthetic) 0.27     RADHA by Velocity Ratio 0.96 cm²    Mr max delbert 4.58  m/s    MV mean gradient 3 mmHg    MV peak gradient 9 mmHg    MV stenosis pressure 1/2 time 103.88 ms    MV valve area p 1/2 method 2.12 cm2    MV valve area by continuity eq 1.89 cm2    MV VTI 40.8 cm    TV mean gradient 24 mmHg    Triscuspid Valve Regurgitation Peak Gradient 29 mmHg    PV mean gradient 2 mmHg    RVOT peak chandrika 0.88 m/s    Ao root annulus 3.41 cm    STJ 3.50 cm    Ascending aorta 3.41 cm    IVC diameter 2.05 cm    Mean e' 0.09 m/s    ZLVIDS -2.52     ZLVIDD -5.04     RVDD 2.80 cm    TV resting pulmonary artery pressure 37 mmHg    RV TB RVSP 11 mmHg    Est. RA pres 8 mmHg    Narrative      Left Ventricle: The left ventricle is normal in size. Normal wall   thickness. Septal motion is consistent with post-operative status. There   is normal systolic function with a visually estimated ejection fraction of   60 - 65%. Grade II diastolic dysfunction.    Right Ventricle: Normal right ventricular cavity size. Wall thickness   is normal. Systolic function is normal.    Left Atrium: Left atrium is severely dilated.    Aortic Valve: There is severe aortic valve sclerosis. Severely   restricted motion. There is moderate to severe stenosis. Aortic valve area   by VTI is 0.84 cm². Aortic valve peak velocity is 3.67 m/s. Mean gradient   is 34 mmHg. The dimensionless index is 0.27.    Mitral Valve: There is mild regurgitation.    Tricuspid Valve: There is mild regurgitation.    Pulmonary Artery: The estimated pulmonary artery systolic pressure is   37 mmHg.    IVC/SVC: Intermediate venous pressure at 8 mmHg.       Assessment and Plan:     Brief HPI:     * Chest pain  -Patient with history of CABG who presents with exertional CP, concerning for angina  -Reported relief with morphine given in ED  -Initial troponin negative, continue to trend  -Continue ASA, BB, ACEi, Ranexa  -Consider addition of nitrates and amlodipine  -Recent MPI stress test from 6/24 negative  -TTE 6/24 with normal EF, mod AS  -Will consider [facility]  tmw for definitive evaluation, keep NPO after Mn    Paroxysmal atrial fibrillation  -Currently in SR, s/p PVI June 2024 by Dr. Huerta  -EKG reviewed, prolonged 1st degree AV block  -Coreg dosage adjusted to 3.125 mg BID  -Continue same dose of Rythmol for now  -EP asked to review  -Heparin gtt for AC    Mixed hyperlipidemia  -Repatha as OP    Nonrheumatic aortic valve stenosis  -Moderate by last TTE    Essential hypertension  -Continue home meds as tolerated  -Consider amlodipine  -Coreg dosage adjusted given EKG findings      8.27.2024  CATH TODAY   ADJUSTING BB AND ANTIARRHYTHMIC DUE TO PROLONGED  UT  VTE Risk Mitigation (From admission, onward)           Ordered     IP VTE HIGH RISK PATIENT  Once         08/26/24 1402     Place sequential compression device  Until discontinued         08/26/24 1402     Reason for No Pharmacological VTE Prophylaxis  Once        Question:  Reasons:  Answer:  Physician Provided (leave comment)  Comment:  on heparin gtt    08/26/24 1402                    Stanton Jefferson MD  Cardiology  [facility] - Telemetry ([facility])

## 2024-08-27 NOTE — PLAN OF CARE
L TR Band removed per protocol. Pressure dressing placed.   Discharge instructions, verbal as well as printed, discussed with pt with teach back received.   Transferred back to room 226, via hospital bed, in no apparent distress.   Report given to DARWIN Purcell; no further questions at this time.   Family at bedside at time of transfer.

## 2024-08-27 NOTE — DISCHARGE INSTRUCTIONS
" Post-op Heart Catheterization    1. DIET: It is advisable for you to follow a diet that limits the intake of salt, sugar, saturated fats and cholesterol.     2. DRIVING: Due to sedation you received during your procedure, DO NOT drive or operate machinery for 24 hours. Avoid making critical decisions or signing legal documents until tomorrow.    3. ACTIVITY: AVOID activities that require bending of the affected arm/wrist for 3 days and submerging the site in water for 3 days.   REMOVE the dressing 24 hours after it was placed, and shower.    Wash gently with soap and water in hand; do not use wash cloth.  Apply a bandaid to site after shower if desired.   No ointments, lotions, powders, colognes, ... for 5 days.  WEAR wrist immobilizer until Wednesday night at bedtime.  No pushing, pulling, or lifting more than 10 pounds for 3 days  No riding motorcycles, riding lawn mowers, using push mowers or weed eaters... for 3 days.  You may RESUME your normal activities or prescribed exercise program as instructed by your physician after 3 days.                                                                                                       4. WOUND CARE: It is not unusual to have a small amount of bruising to appear at or near the puncture site. It is also common to have a tender "knot" develop beneath the skin at the puncture site of the wrist/arm. This is usually scar tissue and is not a cause for concern or alarm. This tender knot may take several weeks to fully resolve. The bruise will usually spread over several days. However, if the lump gets bigger, call your doctor immediately.    5. DISCOMFORT: For general discomfort at the puncture site, you may take 1 or 2 Acetaminophen (Tylenol) tablets every 4 - 6 hours as needed. (Do not take more than 4000 mg a day)    6. SIGNS AND SYMPTOMS TO REPORT:  Call your physician immediately if any of the following occur:                                            1. Loss of " "feeling, warmth or color to the affected arm/wrist                                                                                                            2. Mild bleeding from the site                 3. Pain that is sudden, sharp or persistent in the affected arm/wrist                 4. Swelling or a change in "lump" size, increased redness or drainage at the puncture site                                                                               5. High fever (101 degrees or higher)    7. GO TO  THE EMERGENCY ROOM OR CALL 911 IF YOU HAVE: Chest pains or discomforts not relieved with 3 nitroglycerin doses (sublingual tablets or spray), numbness or severe pain of limb, if your limb becomes cold or discolored or if you develop uncontrolled bleeding from the puncture site (quickly apply firm, direct pressure above the site).  "

## 2024-08-28 ENCOUNTER — TELEPHONE (OUTPATIENT)
Dept: INTERNAL MEDICINE | Facility: CLINIC | Age: 79
End: 2024-08-28
Payer: MEDICARE

## 2024-08-28 ENCOUNTER — OFFICE VISIT (OUTPATIENT)
Dept: CARDIOLOGY | Facility: CLINIC | Age: 79
End: 2024-08-28
Payer: MEDICARE

## 2024-08-28 ENCOUNTER — TELEPHONE (OUTPATIENT)
Dept: GASTROENTEROLOGY | Facility: CLINIC | Age: 79
End: 2024-08-28
Payer: MEDICARE

## 2024-08-28 VITALS
HEART RATE: 75 BPM | SYSTOLIC BLOOD PRESSURE: 122 MMHG | DIASTOLIC BLOOD PRESSURE: 70 MMHG | BODY MASS INDEX: 35.74 KG/M2 | WEIGHT: 228.19 LBS | OXYGEN SATURATION: 99 %

## 2024-08-28 DIAGNOSIS — G72.0 STATIN MYOPATHY: ICD-10-CM

## 2024-08-28 DIAGNOSIS — G47.33 OSA ON CPAP: Chronic | ICD-10-CM

## 2024-08-28 DIAGNOSIS — T46.6X5A STATIN MYOPATHY: ICD-10-CM

## 2024-08-28 DIAGNOSIS — I25.10 CORONARY ARTERY DISEASE INVOLVING NATIVE CORONARY ARTERY OF NATIVE HEART WITHOUT ANGINA PECTORIS: Chronic | ICD-10-CM

## 2024-08-28 DIAGNOSIS — E78.2 MIXED HYPERLIPIDEMIA: ICD-10-CM

## 2024-08-28 DIAGNOSIS — I25.118 CORONARY ARTERY DISEASE WITH EXERTIONAL ANGINA: ICD-10-CM

## 2024-08-28 DIAGNOSIS — I35.0 NONRHEUMATIC AORTIC VALVE STENOSIS: ICD-10-CM

## 2024-08-28 DIAGNOSIS — Z66 DNR (DO NOT RESUSCITATE): ICD-10-CM

## 2024-08-28 DIAGNOSIS — I48.0 PAROXYSMAL ATRIAL FIBRILLATION: ICD-10-CM

## 2024-08-28 DIAGNOSIS — I49.3 PVC (PREMATURE VENTRICULAR CONTRACTION): ICD-10-CM

## 2024-08-28 DIAGNOSIS — I70.0 AORTIC ATHEROSCLEROSIS: Primary | ICD-10-CM

## 2024-08-28 DIAGNOSIS — I10 ESSENTIAL HYPERTENSION: Chronic | ICD-10-CM

## 2024-08-28 PROCEDURE — 1101F PT FALLS ASSESS-DOCD LE1/YR: CPT | Mod: CPTII,S$GLB,, | Performed by: INTERNAL MEDICINE

## 2024-08-28 PROCEDURE — 99999 PR PBB SHADOW E&M-EST. PATIENT-LVL II: CPT | Mod: PBBFAC,,, | Performed by: INTERNAL MEDICINE

## 2024-08-28 PROCEDURE — 1160F RVW MEDS BY RX/DR IN RCRD: CPT | Mod: CPTII,S$GLB,, | Performed by: INTERNAL MEDICINE

## 2024-08-28 PROCEDURE — 3074F SYST BP LT 130 MM HG: CPT | Mod: CPTII,S$GLB,, | Performed by: INTERNAL MEDICINE

## 2024-08-28 PROCEDURE — 1157F ADVNC CARE PLAN IN RCRD: CPT | Mod: CPTII,S$GLB,, | Performed by: INTERNAL MEDICINE

## 2024-08-28 PROCEDURE — 3072F LOW RISK FOR RETINOPATHY: CPT | Mod: CPTII,S$GLB,, | Performed by: INTERNAL MEDICINE

## 2024-08-28 PROCEDURE — 3078F DIAST BP <80 MM HG: CPT | Mod: CPTII,S$GLB,, | Performed by: INTERNAL MEDICINE

## 2024-08-28 PROCEDURE — 1159F MED LIST DOCD IN RCRD: CPT | Mod: CPTII,S$GLB,, | Performed by: INTERNAL MEDICINE

## 2024-08-28 PROCEDURE — 3288F FALL RISK ASSESSMENT DOCD: CPT | Mod: CPTII,S$GLB,, | Performed by: INTERNAL MEDICINE

## 2024-08-28 PROCEDURE — 99214 OFFICE O/P EST MOD 30 MIN: CPT | Mod: S$GLB,,, | Performed by: INTERNAL MEDICINE

## 2024-08-28 NOTE — PROGRESS NOTES
Subjective:   Patient ID:  Erendira Pretty is a 79 y.o. male who presents for follow-up of No chief complaint on file.  Pt is s/p cath yesterday for CP. Cath showed patent grafts LIMA to LAD, SVG to OM, RCA with nonobstructive ds  Pt with severe AS. LEO occlusion planned after GI w/u. He has to be on Xarelto a short period of time after procedure  Pt with melena , off xarelto x 2 days.  No SOB, only SOB when anemic. No dizziness or syncope  Propafenone stopped due to bradycardia in hospital  Hypertension  This is a chronic problem. The current episode started more than 1 year ago. The problem has been gradually improving since onset. The problem is controlled. Pertinent negatives include no chest pain, palpitations or shortness of breath. Past treatments include ACE inhibitors, beta blockers and calcium channel blockers. The current treatment provides moderate improvement. There are no compliance problems.  Hypertensive end-organ damage includes CAD/MI.   Coronary Artery Disease  Presents for follow-up visit. Pertinent negatives include no chest pain, chest pressure, chest tightness, dizziness, leg swelling, muscle weakness, palpitations, shortness of breath or weight gain. The symptoms have been stable. Compliance with diet is variable. Compliance with exercise is variable. Compliance with medications is good.   Hyperlipidemia  This is a chronic problem. The current episode started more than 1 year ago. The problem is controlled. Pertinent negatives include no chest pain or shortness of breath. Treatments tried: repatha. The current treatment provides moderate improvement of lipids. There are no compliance problems.        Review of Systems   Constitutional: Negative. Negative for weight gain.   HENT: Negative.     Eyes: Negative.    Cardiovascular: Negative.  Negative for chest pain, leg swelling and palpitations.   Respiratory: Negative.  Negative for chest tightness and shortness of breath.    Endocrine:  Negative.    Hematologic/Lymphatic: Negative.    Skin: Negative.    Musculoskeletal:  Negative for muscle weakness.   Gastrointestinal: Negative.    Genitourinary: Negative.    Neurological: Negative.  Negative for dizziness.   Psychiatric/Behavioral: Negative.     Allergic/Immunologic: Negative.    All other systems reviewed and are negative.    Family History   Problem Relation Name Age of Onset    Heart disease Mother      Heart disease Father      Heart disease Brother      Colon cancer Neg Hx       Past Medical History:   Diagnosis Date    Anticoagulant long-term use     Aortic stenosis     Asthma     Benign prostatic hyperplasia with nocturia     Bilateral carotid artery stenosis 07/21/2021     CAROTID BILATERAL 07/14/2021 IMPRESSION: Atherosclerosis with suspected stenosis greater than 50% at the right proximal ICA visually. Velocities are discordant and appear improved from prior. Atherosclerosis on the left with no evidence of flow-limiting stenosis greater than 50%.  FINDINGS: Right: Internal Carotid Artery (ICA): Peak systolic velocity 118 cm/sec. 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 09/22/2020    DELONTE on CPAP     Osteoarthritis     Paroxysmal atrial fibrillation     PVD (peripheral vascular disease)     Secondary esophageal varices without bleeding 06/26/2017    Type 2 diabetes mellitus      Social History     Socioeconomic History    Marital status:      Spouse name: YAEL    Number of children: 2   Tobacco  Use    Smoking status: Former     Current packs/day: 0.00     Average packs/day: 0.5 packs/day for 4.0 years (2.0 ttl pk-yrs)     Types: Cigarettes     Start date: 1968     Quit date: 1972     Years since quittin.2    Smokeless tobacco: Former     Types: Chew     Quit date: 1976   Substance and Sexual Activity    Alcohol use: Not Currently    Drug use: No    Sexual activity: Yes     Partners: Female   Social History Narrative    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     Current Outpatient Medications on File Prior to Visit   Medication Sig Dispense Refill    ACCU-CHEK GRACE PLUS METER Misc AS DIRECTED 1 each 0    albuterol (PROVENTIL HFA) 90 mcg/actuation inhaler Inhale 2 puffs into the lungs every 6 (six) hours as needed for Wheezing. Rescue      amLODIPine (NORVASC) 5 MG tablet Take 1 tablet (5 mg total) by mouth once daily. 30 tablet 0    aspirin (ECOTRIN) 81 MG EC tablet Take 1 tablet (81 mg total) by mouth once daily. 90 tablet 3    blood sugar diagnostic (ACCU-CHEK GRACE PLUS TEST STRP) Strp TEST THREE TIMES A  strip 11    budesonide-formoterol 160-4.5 mcg (SYMBICORT) 160-4.5 mcg/actuation HFAA INHALE 2 PUFFS INTO THE LUNGS TWO TIMES A DAY. (Patient taking differently: Inhale 2 puffs into the lungs every 12 (twelve) hours. INHALE 2 PUFFS INTO THE LUNGS TWO TIMES A DAY.) 10.2 g 11    carvediloL (COREG) 3.125 MG tablet Take 1 tablet (3.125 mg total) by mouth 2 (two) times daily with meals. 60 tablet 0    empagliflozin (JARDIANCE) 25 mg tablet Take 1 tablet (25 mg total) by mouth once daily. 90 tablet 3    famotidine (PEPCID) 40 MG tablet Take 40 mg by mouth once daily.      finasteride (PROSCAR) 5 mg tablet TAKE 1 TABLET BY MOUTH once daily 90 tablet 3    furosemide (LASIX) 40 MG tablet Take 1 tablet (40 mg total) by mouth 2 (two) times daily. 60 tablet 11    GLUCOSAMINE  "HCL/CHONDR ROSARIO A NA (OSTEO BI-FLEX ORAL) Take 1 tablet by mouth 2 (two) times daily.       inclisiran (LEQVIO) 284 mg/1.5 mL Syrg subcutaneous injection Inject 1.5 mLs (284 mg total) into the skin every 3 (three) months. 1.5 mL 1    inclisiran (LEQVIO) 284 mg/1.5 mL Syrg subcutaneous injection Inject 1.5 mLs (284 mg total) into the skin every 6 (six) months. 1.5 mL 1    isosorbide mononitrate (IMDUR) 30 MG 24 hr tablet Take 1 tablet (30 mg total) by mouth once daily. 30 tablet 0    krill oil 500 mg Cap Take 1 capsule by mouth Daily.      lancets (ACCU-CHEK FASTCLIX LANCET DRUM) Misc TEST TWO TIMES A  each 5    LANTUS SOLOSTAR U-100 INSULIN 100 unit/mL (3 mL) InPn pen INJECT 44 UNITS UNDER THE SKIN EVERY EVENING 45 mL 0    levothyroxine (SYNTHROID) 300 MCG Tab Take 1 tablet (300 mcg total) by mouth every morning. 90 tablet 3    lisinopriL 10 MG tablet Take 1 tablet by mouth once daily 90 tablet 3    metFORMIN (GLUCOPHAGE-XR) 500 MG ER 24hr tablet Take 1 tablet (500 mg total) by mouth 2 (two) times daily with meals. 180 tablet 3    mirabegron (MYRBETRIQ) 25 mg Tb24 ER tablet Take 1 tablet (25 mg total) by mouth once daily. 30 tablet 11    montelukast (SINGULAIR) 10 mg tablet Take 1 tablet (10 mg total) by mouth once daily. 90 tablet 3    pen needle, diabetic (BD ULTRA-FINE MINI PEN NEEDLE) 31 gauge x 3/16" Ndle USE AS DIRECTED 100 each 11    RABEprazole (ACIPHEX) 20 mg tablet Take 1 tablet (20 mg total) by mouth 2 (two) times daily. 180 tablet 3    ranolazine (RANEXA) 1,000 mg Tb12 Take 1 tablet (1,000 mg total) by mouth 2 (two) times daily. 60 tablet 2    REPATHA SURECLICK 140 mg/mL PnIj INJECT 140mg subcutaneously every 14 days (Patient taking differently: Inject 140 mg into the skin every 14 (fourteen) days.) 2 mL 11    rivaroxaban (XARELTO) 20 mg Tab Take 20 mg by mouth daily with dinner or evening meal.       Current Facility-Administered Medications on File Prior to Visit   Medication Dose Route Frequency " Provider Last Rate Last Admin    [] 0.9%  NaCl infusion   Intravenous Continuous Stanton Jefferson  mL/hr at 24 1430 Restarted at 24 1430    lactated ringers infusion   Intravenous Continuous Omaira Theodore MD   New Bag at 19 1117    [DISCONTINUED] 0.9%  NaCl infusion    Continuous PRN Stanton Jefferson  mL/hr at 24 1154 100 mL/hr at 24 1154    [DISCONTINUED] acetaminophen tablet 650 mg  650 mg Oral Q4H PRN Stanton Jefferson MD        [DISCONTINUED] amLODIPine tablet 5 mg  5 mg Oral Daily Aster Petty PA-C   5 mg at 24 0804    [DISCONTINUED] carvediloL tablet 3.125 mg  3.125 mg Oral BID WM Aster Petty PA-C   3.125 mg at 24 1633    [DISCONTINUED] dextrose 10% bolus 125 mL 125 mL  12.5 g Intravenous PRN Luis Danelle T., NP        [DISCONTINUED] dextrose 10% bolus 250 mL 250 mL  25 g Intravenous PRN Luis Danelle T., NP        [DISCONTINUED] diphenhydrAMINE injection    PRN Stanton Jefferson MD   25 mg at 24 1149    [DISCONTINUED] famotidine tablet 40 mg  40 mg Oral Daily LuisSinai castrejonytlin T., NP   40 mg at 24 0804    [DISCONTINUED] fentaNYL 50 mcg/mL injection    PRN Stanton Jefferson MD   25 mcg at 24 1239    [DISCONTINUED] finasteride tablet 5 mg  5 mg Oral Daily Luis, Danelle T., NP   5 mg at 24 0804    [DISCONTINUED] glucagon (human recombinant) injection 1 mg  1 mg Intramuscular PRN Sinai Smithytlin T., NP        [DISCONTINUED] glucose chewable tablet 16 g  16 g Oral PRN Luis, Danelle T., NP        [DISCONTINUED] glucose chewable tablet 24 g  24 g Oral PRN Luis Danelle T., NP        [DISCONTINUED] heparin (porcine) injection    PRN Stanton Jefferson MD   3,000 Units at 24 1204    [DISCONTINUED] hydrALAZINE injection 10 mg  10 mg Intravenous Q6H PRN Danelle Smith, KATHY        [DISCONTINUED] insulin aspart U-100 pen 0-5 Units  0-5 Units Subcutaneous QID (AC + HS) PRN Danelle Smith  KATHY BARR        [DISCONTINUED] iohexoL (OMNIPAQUE 300) injection    PRN Stanton Jefferson MD   100 mL at 08/27/24 1246    [DISCONTINUED] isosorbide mononitrate 24 hr tablet 30 mg  30 mg Oral Daily Aster Petty PA-C   30 mg at 08/27/24 0804    [DISCONTINUED] levothyroxine tablet 300 mcg  300 mcg Oral QAM Danelle Smith NP   300 mcg at 08/27/24 0624    [DISCONTINUED] LIDOcaine HCL 20 mg/ml (2%) injection    PRN Stanton Jefferson MD   3 mL at 08/27/24 1239    [DISCONTINUED] lisinopriL tablet 10 mg  10 mg Oral Daily Danelle Smith NP   10 mg at 08/27/24 0804    [DISCONTINUED] midazolam injection    PRN Stanton Jefferson MD   0.5 mg at 08/27/24 1223    [DISCONTINUED] naloxone 0.4 mg/mL injection 0.02 mg  0.02 mg Intravenous PRN Danelle Smith NP        [DISCONTINUED] nitroGLYCERIN injection    PRN Stanton Jefferson MD   200 mcg at 08/27/24 1245    [DISCONTINUED] ondansetron disintegrating tablet 8 mg  8 mg Oral Q8H PRN Stanton Jefferson MD        [DISCONTINUED] ondansetron injection 4 mg  4 mg Intravenous Q8H PRN Danelle Smith NP        [DISCONTINUED] predniSONE tablet 50 mg  50 mg Oral TID Stanton Jefferson MD   50 mg at 08/27/24 1633    [DISCONTINUED] ranolazine 12 hr tablet 1,000 mg  1,000 mg Oral BID Danelle Smith NP   1,000 mg at 08/27/24 0804    [DISCONTINUED] sodium chloride 0.9% flush 10 mL  10 mL Intravenous Q12H PRN Danelle Smith NP         Review of patient's allergies indicates:   Allergen Reactions    Lipitor [atorvastatin] Other (See Comments)     Muscle aches and pains as well as fatigue.     Niacin preparations Other (See Comments)     Causes broken blood vessels and bruises at 4 times normal dose.    Statins-hmg-coa reductase inhibitors Other (See Comments)     Statins cause intolerable myalgias.    Iodinated contrast media Hives     Tachycardia    Omeprazole Hives and Itching    Prilosec [omeprazole magnesium] Hives and Itching       Objective:     Physical  Exam  Vitals and nursing note reviewed.   Constitutional:       Appearance: He is well-developed.   HENT:      Head: Normocephalic and atraumatic.   Eyes:      Conjunctiva/sclera: Conjunctivae normal.      Pupils: Pupils are equal, round, and reactive to light.   Cardiovascular:      Rate and Rhythm: Normal rate and regular rhythm.      Pulses: Intact distal pulses.      Heart sounds: Normal heart sounds. Murmur: 2/6 AS murmur.   Pulmonary:      Effort: Pulmonary effort is normal.      Breath sounds: Normal breath sounds.   Abdominal:      General: Bowel sounds are normal.      Palpations: Abdomen is soft.   Musculoskeletal:      Cervical back: Normal range of motion and neck supple.   Skin:     General: Skin is warm and dry.   Neurological:      Mental Status: He is alert and oriented to person, place, and time.         Assessment:     1. Aortic atherosclerosis    2. Coronary artery disease involving native coronary artery of native heart without angina pectoris    3. Coronary artery disease with exertional angina    4. Essential hypertension    5. Mixed hyperlipidemia    6. Nonrheumatic aortic valve stenosis    7. Paroxysmal atrial fibrillation    8. PVC (premature ventricular contraction)    9. DELONTE on CPAP    10. DNR (do not resuscitate)    11. Statin myopathy        Plan:     Aortic atherosclerosis    Coronary artery disease involving native coronary artery of native heart without angina pectoris    Coronary artery disease with exertional angina    Essential hypertension    Mixed hyperlipidemia    Nonrheumatic aortic valve stenosis    Paroxysmal atrial fibrillation    PVC (premature ventricular contraction)    DELONTE on CPAP    DNR (do not resuscitate)    Statin myopathy      Continue lisinopril , norvasc , coreg- HTN/PAF  continue repatha - HLP

## 2024-08-28 NOTE — TELEPHONE ENCOUNTER
----- Message from Juanita Smart MA sent at 8/28/2024 10:07 AM CDT -----  Please give patient a call to schedule an appt. thanks

## 2024-08-28 NOTE — TELEPHONE ENCOUNTER
----- Message from Jacquelin Lyons sent at 8/28/2024  8:49 AM CDT -----  Contact: MEGHAN MARIEE [[id number]]  .Type:  Sooner Apoointment Request    Caller is requesting a sooner appointment.  Caller declined first available appointment listed below.  Caller will not accept being placed on the waitlist and is requesting a message be sent to doctor.  Name of Caller: MEGHAN MARIEE [[id number]]  When is the first available appointment? 9/10  Symptoms: hospital f/u  Would the patient rather a call back or a response via [facility]?  call  Best Call Back Number: [phone]  Additional Information:   '

## 2024-08-29 ENCOUNTER — TELEPHONE (OUTPATIENT)
Dept: INTERNAL MEDICINE | Facility: CLINIC | Age: 79
End: 2024-08-29

## 2024-08-29 ENCOUNTER — LAB VISIT (OUTPATIENT)
Dept: LAB | Facility: HOSPITAL | Age: 79
End: 2024-08-29
Attending: PEDIATRICS
Payer: MEDICARE

## 2024-08-29 ENCOUNTER — OFFICE VISIT (OUTPATIENT)
Dept: INTERNAL MEDICINE | Facility: CLINIC | Age: 79
End: 2024-08-29
Payer: MEDICARE

## 2024-08-29 VITALS
HEIGHT: 67 IN | OXYGEN SATURATION: 99 % | WEIGHT: 227.5 LBS | BODY MASS INDEX: 35.71 KG/M2 | HEART RATE: 94 BPM | DIASTOLIC BLOOD PRESSURE: 62 MMHG | SYSTOLIC BLOOD PRESSURE: 114 MMHG | TEMPERATURE: 97 F

## 2024-08-29 DIAGNOSIS — Z09 HOSPITAL DISCHARGE FOLLOW-UP: Primary | ICD-10-CM

## 2024-08-29 DIAGNOSIS — Z79.4 TYPE 2 DIABETES MELLITUS WITH MICROALBUMINURIA, WITH LONG-TERM CURRENT USE OF INSULIN: Chronic | ICD-10-CM

## 2024-08-29 DIAGNOSIS — I48.0 PAROXYSMAL ATRIAL FIBRILLATION: ICD-10-CM

## 2024-08-29 DIAGNOSIS — K92.1 MELENA: ICD-10-CM

## 2024-08-29 DIAGNOSIS — I35.0 NONRHEUMATIC AORTIC VALVE STENOSIS: Primary | ICD-10-CM

## 2024-08-29 DIAGNOSIS — E11.29 TYPE 2 DIABETES MELLITUS WITH MICROALBUMINURIA, WITH LONG-TERM CURRENT USE OF INSULIN: Chronic | ICD-10-CM

## 2024-08-29 DIAGNOSIS — Z98.890 S/P LEFT HEART CATHETERIZATION BY PERCUTANEOUS APPROACH: ICD-10-CM

## 2024-08-29 DIAGNOSIS — D50.0 IRON DEFICIENCY ANEMIA DUE TO CHRONIC BLOOD LOSS: ICD-10-CM

## 2024-08-29 DIAGNOSIS — I10 ESSENTIAL HYPERTENSION: Chronic | ICD-10-CM

## 2024-08-29 DIAGNOSIS — R80.9 TYPE 2 DIABETES MELLITUS WITH MICROALBUMINURIA, WITH LONG-TERM CURRENT USE OF INSULIN: Chronic | ICD-10-CM

## 2024-08-29 LAB
BASOPHILS # BLD AUTO: 0.01 K/UL (ref 0–0.2)
BASOPHILS NFR BLD: 0.2 % (ref 0–1.9)
DIFFERENTIAL METHOD BLD: ABNORMAL
EOSINOPHIL # BLD AUTO: 0 K/UL (ref 0–0.5)
EOSINOPHIL NFR BLD: 0.8 % (ref 0–8)
ERYTHROCYTE [DISTWIDTH] IN BLOOD BY AUTOMATED COUNT: 18.1 % (ref 11.5–14.5)
HCT VFR BLD AUTO: 35 % (ref 40–54)
HGB BLD-MCNC: 10.5 G/DL (ref 14–18)
IMM GRANULOCYTES # BLD AUTO: 0.02 K/UL (ref 0–0.04)
IMM GRANULOCYTES NFR BLD AUTO: 0.4 % (ref 0–0.5)
LYMPHOCYTES # BLD AUTO: 1.1 K/UL (ref 1–4.8)
LYMPHOCYTES NFR BLD: 21.3 % (ref 18–48)
MCH RBC QN AUTO: 29.2 PG (ref 27–31)
MCHC RBC AUTO-ENTMCNC: 30 G/DL (ref 32–36)
MCV RBC AUTO: 97 FL (ref 82–98)
MONOCYTES # BLD AUTO: 0.6 K/UL (ref 0.3–1)
MONOCYTES NFR BLD: 11.1 % (ref 4–15)
NEUTROPHILS # BLD AUTO: 3.4 K/UL (ref 1.8–7.7)
NEUTROPHILS NFR BLD: 66.2 % (ref 38–73)
NRBC BLD-RTO: 0 /100 WBC
PLATELET # BLD AUTO: 146 K/UL (ref 150–450)
PMV BLD AUTO: 10.4 FL (ref 9.2–12.9)
RBC # BLD AUTO: 3.6 M/UL (ref 4.6–6.2)
WBC # BLD AUTO: 5.06 K/UL (ref 3.9–12.7)

## 2024-08-29 PROCEDURE — 1101F PT FALLS ASSESS-DOCD LE1/YR: CPT | Mod: CPTII,S$GLB,, | Performed by: NURSE PRACTITIONER

## 2024-08-29 PROCEDURE — 99999 PR PBB SHADOW E&M-EST. PATIENT-LVL IV: CPT | Mod: PBBFAC,,, | Performed by: NURSE PRACTITIONER

## 2024-08-29 PROCEDURE — 3072F LOW RISK FOR RETINOPATHY: CPT | Mod: CPTII,S$GLB,, | Performed by: NURSE PRACTITIONER

## 2024-08-29 PROCEDURE — 1126F AMNT PAIN NOTED NONE PRSNT: CPT | Mod: CPTII,S$GLB,, | Performed by: NURSE PRACTITIONER

## 2024-08-29 PROCEDURE — 3078F DIAST BP <80 MM HG: CPT | Mod: CPTII,S$GLB,, | Performed by: NURSE PRACTITIONER

## 2024-08-29 PROCEDURE — 3074F SYST BP LT 130 MM HG: CPT | Mod: CPTII,S$GLB,, | Performed by: NURSE PRACTITIONER

## 2024-08-29 PROCEDURE — 3288F FALL RISK ASSESSMENT DOCD: CPT | Mod: CPTII,S$GLB,, | Performed by: NURSE PRACTITIONER

## 2024-08-29 PROCEDURE — 85025 COMPLETE CBC W/AUTO DIFF WBC: CPT | Performed by: PEDIATRICS

## 2024-08-29 PROCEDURE — 36415 COLL VENOUS BLD VENIPUNCTURE: CPT | Mod: PO | Performed by: PEDIATRICS

## 2024-08-29 PROCEDURE — 1159F MED LIST DOCD IN RCRD: CPT | Mod: CPTII,S$GLB,, | Performed by: NURSE PRACTITIONER

## 2024-08-29 PROCEDURE — 1157F ADVNC CARE PLAN IN RCRD: CPT | Mod: CPTII,S$GLB,, | Performed by: NURSE PRACTITIONER

## 2024-08-29 PROCEDURE — 99214 OFFICE O/P EST MOD 30 MIN: CPT | Mod: S$GLB,,, | Performed by: NURSE PRACTITIONER

## 2024-08-29 RX ORDER — SILDENAFIL 100 MG/1
TABLET, FILM COATED ORAL
Qty: 30 TABLET | Refills: 11 | Status: SHIPPED | OUTPATIENT
Start: 2024-08-29 | End: 2024-08-29 | Stop reason: SDUPTHER

## 2024-08-29 RX ORDER — SILDENAFIL 100 MG/1
TABLET, FILM COATED ORAL
Qty: 30 TABLET | Refills: 11 | Status: SHIPPED | OUTPATIENT
Start: 2024-08-29 | End: 2024-08-30

## 2024-08-29 RX ORDER — SILDENAFIL 100 MG/1
TABLET, FILM COATED ORAL
Qty: 30 TABLET | Refills: 11 | OUTPATIENT
Start: 2024-08-29

## 2024-08-29 NOTE — TELEPHONE ENCOUNTER
No care due was identified.  [facility] Embedded Care Due Messages. Reference number: [id number].   8/29/2024 9:06:02 AM CDT

## 2024-08-29 NOTE — TELEPHONE ENCOUNTER
----- Message from Kayleigh Paulino NP sent at 8/29/2024  3:12 PM CDT -----  Regarding: RE: appointment  If she does not think he needs a sooner evaluation, let him know that. He is not having active bleeding symptoms currently. Also advise to go back to ED if any symptoms of bleeding reoccur.  ----- Message -----  From: Marii Douglas MA  Sent: 8/29/2024   3:06 PM CDT  To: Kayleigh Paulino NP  Subject: FW: appointment                                    ----- Message -----  From: Ryan Peter LPN  Sent: 8/29/2024   2:44 PM CDT  To: Marii Dogulas MA  Subject: RE: appointment                                  Thank you. I was trying to find him something sooner but Phi Cordero said he does not need to be seen before October because this is an ongoing issue for him. He had a colonoscopy in July and then a Video capsule. But if she still wants him seen sooner, please let me know.    Thank you!  Ryan  ----- Message -----  From: Marii Douglas MA  Sent: 8/29/2024   2:38 PM CDT  To: Ryan Peter LPN  Subject: RE: appointment                                  GI Bleed. He came to us today for a hospital follow-up. Kayleigh Paulino would like for him to be seen before October  ----- Message -----  From: Ryan Peter LPN  Sent: 8/29/2024   2:34 PM CDT  To: Marii Douglas MA  Subject: RE: appointment                                  What is he being seen for?  ----- Message -----  From: Marii Douglas MA  Sent: 8/29/2024   1:56 PM CDT  To: [facility] Clinical Staff; #  Subject: appointment                                      Good afternoon,    Patient is needing to be seen before October, is there any way to get him scheduled for a sooner appointment? I've checked every location and he doesn't want to drive out to [place]. He is okay with doing a virtual appointment sooner and then coming in for the October appointment if he needs to do that as well.

## 2024-08-29 NOTE — TELEPHONE ENCOUNTER
Patient is aware and verbally understood that if he starts having any bleed reoccur, he will go back to the ED

## 2024-08-29 NOTE — PROGRESS NOTES
Subjective:       Patient ID: Erendira Pretty is a 79 y.o. male.    Chief Complaint: Follow-up    HPI      Patient here for hospital follow-up   He was seen in emergency room secondary to epigastric pain and chest pain  Pt is s/p cath yesterday for CP. Cath showed patent grafts LIMA to LAD, SVG to OM, RCA with nonobstructive ds  Pt with severe AS. LEO occlusion planned after GI w/u. He has to be on Xarelto a short period of time after procedure  Pt with melena , off xarelto   Patient is still off of Xarelto for now plan for homer and procedure for paroxysmal atrial fibrillation plan later this year patient needs to see GI in the future to address recurrent GI bleeds  Patient is requesting refill on Viagra he is on Imdur he states his cardiologist told him that he can still take the Viagra despite use of the Imdur will have to get clarification on this  Today he is not experiencing any chest pain no abdominal pain he denies any melena or hematochezia no fever or any other acute symptoms        Review of Systems   Constitutional:  Negative for activity change, appetite change, chills, diaphoresis, fatigue, fever and unexpected weight change.   HENT:  Negative for congestion, ear pain, postnasal drip, rhinorrhea, sinus pressure, sinus pain, sneezing, sore throat, tinnitus, trouble swallowing and voice change.    Eyes:  Negative for photophobia, pain and visual disturbance.   Respiratory:  Negative for cough, chest tightness, shortness of breath and wheezing.    Cardiovascular:  Negative for chest pain, palpitations and leg swelling.   Gastrointestinal:  Negative for abdominal distention, abdominal pain, constipation, diarrhea, nausea and vomiting.   Genitourinary:  Negative for decreased urine volume, difficulty urinating, dysuria, flank pain, frequency, hematuria and urgency.   Musculoskeletal:  Negative for arthralgias, back pain, joint swelling, neck pain and neck stiffness.   Allergic/Immunologic: Negative for  immunocompromised state.   Neurological:  Negative for dizziness, tremors, seizures, syncope, facial asymmetry, speech difficulty, weakness, light-headedness, numbness and headaches.   Hematological:  Negative for adenopathy. Does not bruise/bleed easily.   Psychiatric/Behavioral:  Negative for confusion and sleep disturbance.        Objective:      Physical Exam  Constitutional:       Appearance: He is obese.   HENT:      Head: Normocephalic and atraumatic.      Right Ear: Tympanic membrane normal.      Left Ear: Tympanic membrane normal.   Eyes:      Conjunctiva/sclera: Conjunctivae normal.   Cardiovascular:      Rate and Rhythm: Normal rate and regular rhythm.      Heart sounds: Normal heart sounds.   Pulmonary:      Effort: Pulmonary effort is normal.      Breath sounds: Normal breath sounds.   Abdominal:      General: Bowel sounds are normal.      Palpations: Abdomen is soft.   Musculoskeletal:         General: Normal range of motion.      Cervical back: Normal range of motion and neck supple.   Skin:     General: Skin is warm and dry.   Neurological:      Mental Status: He is alert and oriented to person, place, and time.         Assessment:     Vitals:    08/29/24 1312   BP: 114/62   Pulse: 94   Temp: 97.2 °F (36.2 °C)         1. Hospital discharge follow-up    2. S/P left heart catheterization by percutaneous approach    3. Paroxysmal atrial fibrillation    4. Essential hypertension    5. Type 2 diabetes mellitus with microalbuminuria, with long-term current use of insulin    6. Melena        Plan:   He is currently stable.  Follow up with GI and Cardiology signed symptoms warrant re-evaluation discussed with patient in detail  I will reach out to Cardiology to see if patient is okay to take Viagra he is on Imdur and is contraindicated to take both  We will try to get patient's sooner appointment with GI in October as cardiology is awaiting workup prior to initiating procedures    Transitional Care  Note    Family and/or Caretaker present at visit?  No.  Diagnostic tests reviewed/disposition: No diagnosic tests pending after this hospitalization.  Disease/illness education: yes  Home health/community services discussion/referrals: Patient does not have home health established from hospital visit.  They do not need home health.  If needed, we will set up home health for the patient.   Establishment or re-establishment of referral orders for community resources: No other necessary community resources.   Discussion with other health care providers: note sent to cardiology see above.

## 2024-08-30 ENCOUNTER — TELEPHONE (OUTPATIENT)
Dept: INTERNAL MEDICINE | Facility: CLINIC | Age: 79
End: 2024-08-30
Payer: MEDICARE

## 2024-08-30 VITALS
SYSTOLIC BLOOD PRESSURE: 118 MMHG | TEMPERATURE: 98 F | DIASTOLIC BLOOD PRESSURE: 62 MMHG | BODY MASS INDEX: 34.98 KG/M2 | WEIGHT: 222.88 LBS | HEART RATE: 78 BPM | HEIGHT: 67 IN | OXYGEN SATURATION: 98 % | RESPIRATION RATE: 18 BRPM

## 2024-08-30 NOTE — DISCHARGE SUMMARY
[facility] ([facility] Medicine  Discharge Summary      Patient Name: Erendira Pretty  MRN: [id number]  LLOYD: [id number]  Patient Class: OP- Observation  Admission Date: 8/26/2024  Hospital Length of Stay: 0 days  Discharge Date and Time: 8/27/2024  6:18 PM  Attending Physician: No att. providers found   Discharging Provider: Tima Hernandez MD  Primary Care Provider: Bhupinder Callaway MD    Primary Care Team: Networked reference to record PCT     HPI:   Mr. Erendira Pretty is a 79 year old male with a past medical history of aortic stenosis, BPH, bilateral carotid artery stenosis, HLD, HTN, PAF on xarelto and ASA, PVD, asthma, cirrhosis, COPD, Hep C, hypothyroidism, DELONTE on CPAP, OA, Type 2 Diabetes Mellitus who presented to the ER with complaints of worsening CP. Patient reports over the past 2-3 days, states it is made worse with exertion. States the pain comes and goes, reports it has worsened in severity. Does endorse mild BLE swelling. Denies any SOB, nausea, vomiting, abd pain, or dysuria. In ER: H/H 10.9/33.6, PLT count 151, PT 12, INR 1.0, Sodium 141, Potassium 3.8, BUN 22, Crit 1.1, , Trop 0.015, EKG: First degree AV block, Premature atrial complexes, Sinus rhythm. Echo ordered, results pending. VS HR 70, /91, Spo2 100% on room air, Temp 97.9, RR 20. Started on heparin gtt, Cardiology consulted and [facility] consulted for admission.     Procedure(s) (LRB):  Angiogram, Coronary, with Left Heart Cath (N/A)  Bypass graft study  ARTERIOGRAM, SUBCLAVIAN  INSERTION, CATHETER, RIGHT HEART (Right)  Valve study-aortic      Hospital Course:   Patient admitted with chest pain and first-degree AV block on EKG.  Cardiology was consulted and recommended heparin GTT while awaiting a left heart catheterization.  He underwent a right heart catheterization on 08/27/2024 through the left wrist which showed    Patent LIMA TO LAD and SVG OM2. Non obstructive disease of RCA unchanged from the past.    The RPAV  lesion was 50% stenosed.    The Mid LM to Dist LM lesion was 80% stenosed.    The Dist LM to Ost LAD lesion was 90% stenosed.    The Ost Cx to Prox Cx lesion was 90% stenosed.    The Mid Cx lesion was 100% stenosed.    The pre-procedure left ventricular end diastolic pressure was 16.    The estimated blood loss was none.    There was two vessel coronary artery disease.    The filling pressures mildly elevated.    There was severe aortic valve stenosis.    RADHA by Hakki equation was 1.02 cm2, pullback gradient of 38 mmhg, CO 6.36. Follow up/evaluate with structural cardiology.    Postprocedure patient was cleared by Cardiology for discharge and follow up with Dr. Gallardo his primary cardiologist.  Patient was stable for discharge.     Goals of Care Treatment Preferences:  Code Status: Full Code    Living Will: Yes                 Consults:   Consults (From admission, onward)          Status Ordering Provider     Inpatient consult to Cardiology  Once        Provider:  Stanton Jefferson MD Completed PATEL, PALVI J            No new Assessment & Plan notes have been filed under this hospital service since the last note was generated.  Service: Hospital Medicine    Final Active Diagnoses:    Diagnosis Date Noted POA    PRINCIPAL PROBLEM:  Chest pain [R07.9] 02/23/2024 Yes    Paroxysmal atrial fibrillation [I48.0] 07/21/2021 Yes    GERD (gastroesophageal reflux disease) [K21.9] 07/17/2019 Yes    Mixed hyperlipidemia [E78.2] 11/03/2018 Yes    Nonrheumatic aortic valve stenosis [I35.0]  Yes    Mild intermittent asthma without complication [J45.20]  Yes    DELONTE on CPAP [G47.33] 07/07/2015 Yes     Chronic    Essential hypertension [I10] 07/08/2013 Yes     Chronic      Problems Resolved During this Admission:    Diagnosis Date Noted Date Resolved POA    Bilateral carotid artery stenosis [I65.23] 07/21/2021 08/26/2024 Yes     Chronic    PVD (peripheral vascular disease) [I73.9]  08/26/2024 Yes       Discharged Condition:  "stable    Disposition: Home or Self Care    Follow Up:   Follow-up Information       Bhupinder Callaway MD Follow up in 3 day(s).    Specialties: Internal Medicine, Pediatrics  Contact information:  [address]  [place] [address] [phone]               Frank Gallardo MD Follow up in 1 week(s).    Specialties: Interventional Cardiology, Cardiology  Contact information:  [address] [place] [address] [phone]                           Patient Instructions:      Diet Adult Regular     Activity as tolerated       Significant Diagnostic Studies: Labs: BMP: No results for input(s): "GLU", "NA", "K", "CL", "CO2", "BUN", "CREATININE", "CALCIUM", "MG" in the last 48 hours. and CBC   Recent Labs   Lab 08/29/24  1129   WBC 5.06   HGB 10.5*   HCT 35.0*   *     Radiology:   No orders to display        Pending Diagnostic Studies:       None           Medications:  Reconciled Home Medications:      Medication List        START taking these medications      amLODIPine 5 MG tablet  Commonly known as: NORVASC  Take 1 tablet (5 mg total) by mouth once daily.     isosorbide mononitrate 30 MG 24 hr tablet  Commonly known as: IMDUR  Take 1 tablet (30 mg total) by mouth once daily.            CHANGE how you take these medications      budesonide-formoterol 160-4.5 mcg 160-4.5 mcg/actuation Hfaa  Commonly known as: SYMBICORT  INHALE 2 PUFFS INTO THE LUNGS TWO TIMES A DAY.  What changed:   how much to take  how to take this  when to take this     carvediloL 3.125 MG tablet  Commonly known as: COREG  Take 1 tablet (3.125 mg total) by mouth 2 (two) times daily with meals.  What changed:   medication strength  how much to take     REPATHA SURECLICK 140 mg/mL Pnij  Generic drug: evolocumab  INJECT 140mg subcutaneously every 14 days  What changed: See the new instructions.            CONTINUE taking these medications      ACCU-CHEK GRACE PLUS METER Misc  Generic drug: blood-glucose meter  AS " "DIRECTED     ACCU-CHEK FASTCLIX LANCET DRUM Misc  Generic drug: lancets  TEST TWO TIMES A DAY     aspirin 81 MG EC tablet  Commonly known as: ECOTRIN  Take 1 tablet (81 mg total) by mouth once daily.     blood sugar diagnostic Strp  Commonly known as: ACCU-CHEK GRACE PLUS TEST STRP  TEST THREE TIMES A DAY     empagliflozin 25 mg tablet  Commonly known as: JARDIANCE  Take 1 tablet (25 mg total) by mouth once daily.     famotidine 40 MG tablet  Commonly known as: PEPCID  Take 40 mg by mouth once daily.     finasteride 5 mg tablet  Commonly known as: PROSCAR  TAKE 1 TABLET BY MOUTH once daily     furosemide 40 MG tablet  Commonly known as: LASIX  Take 1 tablet (40 mg total) by mouth 2 (two) times daily.     * inclisiran 284 mg/1.5 mL Syrg subcutaneous injection  Commonly known as: LEQVIO  Inject 1.5 mLs (284 mg total) into the skin every 3 (three) months.     * inclisiran 284 mg/1.5 mL Syrg subcutaneous injection  Commonly known as: LEQVIO  Inject 1.5 mLs (284 mg total) into the skin every 6 (six) months.     krill oil 500 mg Cap  Take 1 capsule by mouth Daily.     LANTUS SOLOSTAR U-100 INSULIN 100 unit/mL (3 mL) Inpn pen  Generic drug: insulin glargine U-100 (Lantus)  INJECT 44 UNITS UNDER THE SKIN EVERY EVENING     levothyroxine 300 MCG Tab  Commonly known as: SYNTHROID  Take 1 tablet (300 mcg total) by mouth every morning.     lisinopriL 10 MG tablet  Take 1 tablet by mouth once daily     metFORMIN 500 MG ER 24hr tablet  Commonly known as: GLUCOPHAGE-XR  Take 1 tablet (500 mg total) by mouth 2 (two) times daily with meals.     mirabegron 25 mg Tb24 ER tablet  Commonly known as: MYRBETRIQ  Take 1 tablet (25 mg total) by mouth once daily.     montelukast 10 mg tablet  Commonly known as: SINGULAIR  Take 1 tablet (10 mg total) by mouth once daily.     OSTEO BI-FLEX ORAL  Take 1 tablet by mouth 2 (two) times daily.     pen needle, diabetic 31 gauge x 3/16" Ndle  Commonly known as: BD ULTRA-FINE MINI PEN NEEDLE  USE AS " DIRECTED     PROVENTIL HFA 90 mcg/actuation inhaler  Generic drug: albuterol  Inhale 2 puffs into the lungs every 6 (six) hours as needed for Wheezing. Rescue     RABEprazole 20 mg tablet  Commonly known as: ACIPHEX  Take 1 tablet (20 mg total) by mouth 2 (two) times daily.     ranolazine 1,000 mg Tb12  Commonly known as: RANEXA  Take 1 tablet (1,000 mg total) by mouth 2 (two) times daily.     XARELTO 20 mg Tab  Generic drug: rivaroxaban  Take 20 mg by mouth daily with dinner or evening meal.           * This list has 2 medication(s) that are the same as other medications prescribed for you. Read the directions carefully, and ask your doctor or other care provider to review them with you.                STOP taking these medications      propafenone 150 MG Tab  Commonly known as: RHTHYMOL     sildenafiL 100 MG tablet  Commonly known as: VIAGRA              Indwelling Lines/Drains at time of discharge:   Lines/Drains/Airways       None                   Time spent on the discharge of patient: 40 minutes         Tima Hernandez MD  Department of Hospital Medicine  [facility] ([facility])

## 2024-08-30 NOTE — HOSPITAL COURSE
Patient admitted with chest pain and first-degree AV block on EKG.  Cardiology was consulted and recommended heparin GTT while awaiting a left heart catheterization.  He underwent a right heart catheterization on 08/27/2024 through the left wrist which showed    Patent LIMA TO LAD and SVG OM2. Non obstructive disease of RCA unchanged from the past.    The RPAV lesion was 50% stenosed.    The Mid LM to Dist LM lesion was 80% stenosed.    The Dist LM to Ost LAD lesion was 90% stenosed.    The Ost Cx to Prox Cx lesion was 90% stenosed.    The Mid Cx lesion was 100% stenosed.    The pre-procedure left ventricular end diastolic pressure was 16.    The estimated blood loss was none.    There was two vessel coronary artery disease.    The filling pressures mildly elevated.    There was severe aortic valve stenosis.    RADHA by Hakki equation was 1.02 cm2, pullback gradient of 38 mmhg, CO 6.36. Follow up/evaluate with structural cardiology.    Postprocedure patient was cleared by Cardiology for discharge and follow up with Dr. Gallardo his primary cardiologist.  Patient was stable for discharge.

## 2024-09-05 ENCOUNTER — LAB VISIT (OUTPATIENT)
Dept: LAB | Facility: HOSPITAL | Age: 79
End: 2024-09-05
Attending: NURSE PRACTITIONER
Payer: MEDICARE

## 2024-09-05 DIAGNOSIS — K92.2 LOWER GI BLEED: ICD-10-CM

## 2024-09-05 DIAGNOSIS — K74.69 OTHER CIRRHOSIS OF LIVER: ICD-10-CM

## 2024-09-05 DIAGNOSIS — C22.0 HCC (HEPATOCELLULAR CARCINOMA): ICD-10-CM

## 2024-09-05 DIAGNOSIS — R19.5 DARK STOOLS: ICD-10-CM

## 2024-09-05 DIAGNOSIS — K21.9 GASTROESOPHAGEAL REFLUX DISEASE, UNSPECIFIED WHETHER ESOPHAGITIS PRESENT: ICD-10-CM

## 2024-09-05 LAB
ALBUMIN SERPL BCP-MCNC: 3.4 G/DL (ref 3.5–5.2)
ALP SERPL-CCNC: 76 U/L (ref 55–135)
ALT SERPL W/O P-5'-P-CCNC: 18 U/L (ref 10–44)
ANION GAP SERPL CALC-SCNC: 10 MMOL/L (ref 8–16)
AST SERPL-CCNC: 19 U/L (ref 10–40)
BASOPHILS # BLD AUTO: 0.03 K/UL (ref 0–0.2)
BASOPHILS NFR BLD: 0.9 % (ref 0–1.9)
BILIRUB SERPL-MCNC: 0.2 MG/DL (ref 0.1–1)
BUN SERPL-MCNC: 14 MG/DL (ref 8–23)
CALCIUM SERPL-MCNC: 8.8 MG/DL (ref 8.7–10.5)
CHLORIDE SERPL-SCNC: 105 MMOL/L (ref 95–110)
CO2 SERPL-SCNC: 23 MMOL/L (ref 23–29)
CREAT SERPL-MCNC: 1 MG/DL (ref 0.5–1.4)
DIFFERENTIAL METHOD BLD: ABNORMAL
EOSINOPHIL # BLD AUTO: 0.1 K/UL (ref 0–0.5)
EOSINOPHIL NFR BLD: 2.3 % (ref 0–8)
ERYTHROCYTE [DISTWIDTH] IN BLOOD BY AUTOMATED COUNT: 17.1 % (ref 11.5–14.5)
EST. GFR  (NO RACE VARIABLE): >60 ML/MIN/1.73 M^2
FERRITIN SERPL-MCNC: 46 NG/ML (ref 20–300)
GLUCOSE SERPL-MCNC: 209 MG/DL (ref 70–110)
HCT VFR BLD AUTO: 32.3 % (ref 40–54)
HGB BLD-MCNC: 10.4 G/DL (ref 14–18)
IMM GRANULOCYTES # BLD AUTO: 0.09 K/UL (ref 0–0.04)
IMM GRANULOCYTES NFR BLD AUTO: 2.6 % (ref 0–0.5)
IRON SERPL-MCNC: 24 UG/DL (ref 45–160)
LYMPHOCYTES # BLD AUTO: 0.7 K/UL (ref 1–4.8)
LYMPHOCYTES NFR BLD: 19.5 % (ref 18–48)
MCH RBC QN AUTO: 29.2 PG (ref 27–31)
MCHC RBC AUTO-ENTMCNC: 32.2 G/DL (ref 32–36)
MCV RBC AUTO: 91 FL (ref 82–98)
MONOCYTES # BLD AUTO: 0.5 K/UL (ref 0.3–1)
MONOCYTES NFR BLD: 13.8 % (ref 4–15)
NEUTROPHILS # BLD AUTO: 2.1 K/UL (ref 1.8–7.7)
NEUTROPHILS NFR BLD: 60.9 % (ref 38–73)
NRBC BLD-RTO: 0 /100 WBC
PLATELET # BLD AUTO: 148 K/UL (ref 150–450)
PMV BLD AUTO: 9.8 FL (ref 9.2–12.9)
POTASSIUM SERPL-SCNC: 4.8 MMOL/L (ref 3.5–5.1)
PROT SERPL-MCNC: 6.6 G/DL (ref 6–8.4)
RBC # BLD AUTO: 3.56 M/UL (ref 4.6–6.2)
SATURATED IRON: 6 % (ref 20–50)
SODIUM SERPL-SCNC: 138 MMOL/L (ref 136–145)
TOTAL IRON BINDING CAPACITY: 391 UG/DL (ref 250–450)
TRANSFERRIN SERPL-MCNC: 264 MG/DL (ref 200–375)
WBC # BLD AUTO: 3.49 K/UL (ref 3.9–12.7)

## 2024-09-05 PROCEDURE — 36415 COLL VENOUS BLD VENIPUNCTURE: CPT | Mod: PN | Performed by: NURSE PRACTITIONER

## 2024-09-05 PROCEDURE — 82728 ASSAY OF FERRITIN: CPT | Performed by: NURSE PRACTITIONER

## 2024-09-05 PROCEDURE — 85025 COMPLETE CBC W/AUTO DIFF WBC: CPT | Performed by: NURSE PRACTITIONER

## 2024-09-05 PROCEDURE — 83540 ASSAY OF IRON: CPT | Performed by: NURSE PRACTITIONER

## 2024-09-05 PROCEDURE — 80053 COMPREHEN METABOLIC PANEL: CPT | Performed by: NURSE PRACTITIONER

## 2024-09-09 ENCOUNTER — OFFICE VISIT (OUTPATIENT)
Dept: HEMATOLOGY/ONCOLOGY | Facility: CLINIC | Age: 79
End: 2024-09-09
Payer: MEDICARE

## 2024-09-09 VITALS
DIASTOLIC BLOOD PRESSURE: 74 MMHG | HEIGHT: 67 IN | BODY MASS INDEX: 35.64 KG/M2 | SYSTOLIC BLOOD PRESSURE: 129 MMHG | WEIGHT: 227.06 LBS | HEART RATE: 84 BPM | OXYGEN SATURATION: 99 %

## 2024-09-09 DIAGNOSIS — D50.9 IRON DEFICIENCY ANEMIA, UNSPECIFIED IRON DEFICIENCY ANEMIA TYPE: ICD-10-CM

## 2024-09-09 DIAGNOSIS — K55.21 AVM (ARTERIOVENOUS MALFORMATION) OF SMALL BOWEL, ACQUIRED WITH HEMORRHAGE: Primary | ICD-10-CM

## 2024-09-09 DIAGNOSIS — D69.6 THROMBOCYTOPENIA: ICD-10-CM

## 2024-09-09 PROCEDURE — 3288F FALL RISK ASSESSMENT DOCD: CPT | Mod: CPTII,S$GLB,, | Performed by: NURSE PRACTITIONER

## 2024-09-09 PROCEDURE — 1126F AMNT PAIN NOTED NONE PRSNT: CPT | Mod: CPTII,S$GLB,, | Performed by: NURSE PRACTITIONER

## 2024-09-09 PROCEDURE — 1101F PT FALLS ASSESS-DOCD LE1/YR: CPT | Mod: CPTII,S$GLB,, | Performed by: NURSE PRACTITIONER

## 2024-09-09 PROCEDURE — 1157F ADVNC CARE PLAN IN RCRD: CPT | Mod: CPTII,S$GLB,, | Performed by: NURSE PRACTITIONER

## 2024-09-09 PROCEDURE — 3078F DIAST BP <80 MM HG: CPT | Mod: CPTII,S$GLB,, | Performed by: NURSE PRACTITIONER

## 2024-09-09 PROCEDURE — 1160F RVW MEDS BY RX/DR IN RCRD: CPT | Mod: CPTII,S$GLB,, | Performed by: NURSE PRACTITIONER

## 2024-09-09 PROCEDURE — 1159F MED LIST DOCD IN RCRD: CPT | Mod: CPTII,S$GLB,, | Performed by: NURSE PRACTITIONER

## 2024-09-09 PROCEDURE — 99215 OFFICE O/P EST HI 40 MIN: CPT | Mod: S$GLB,,, | Performed by: NURSE PRACTITIONER

## 2024-09-09 PROCEDURE — 3074F SYST BP LT 130 MM HG: CPT | Mod: CPTII,S$GLB,, | Performed by: NURSE PRACTITIONER

## 2024-09-09 PROCEDURE — G2211 COMPLEX E/M VISIT ADD ON: HCPCS | Mod: S$GLB,,, | Performed by: NURSE PRACTITIONER

## 2024-09-09 PROCEDURE — 99999 PR PBB SHADOW E&M-EST. PATIENT-LVL III: CPT | Mod: PBBFAC,,, | Performed by: NURSE PRACTITIONER

## 2024-09-09 PROCEDURE — 3072F LOW RISK FOR RETINOPATHY: CPT | Mod: CPTII,S$GLB,, | Performed by: NURSE PRACTITIONER

## 2024-09-09 NOTE — PROGRESS NOTES
Subjective:      Patient ID: Erendira Pretty is a 79 y.o. male.    Chief Complaint: no complaints    HPI:   80 yo male presents to the hematology clinic as a new patient for further evaluation and treatment of iron deficiency anemia.  He has been referred to the clinic by his pcp, Dr. Callaway.  He was seen many years ago in the clinic by Dr. Ramón Westbrook for SEBASTIAN and received IV iron at that time.  Is being followed by hepatology for h/o of hcv cured with cirrhosis and esophageal varices. Liver CA diagnosed x 2.   Has recently reported incident of melena which has resolved.     States that all of his anticoagulants and antiplatelets have been stopped.  Is only taking asa 81 mg PO daily.     States that he has been taking an oral iron supplement for about 15 days now.  He denies an GI upset.       Left leg numbness that started 2/26/2024 about 3 weeks after stopping antiplatelets and anticoagulation.  States it is ok in the morning but when he starts to move around the leg becomes numb.     Family hx of cancer:  Brother: lung cancer  Brother #2: skin cancer  Self: HCC     Drink a few beers a day starting last week.  Denies cigarette smoking.  Denies illicit drug use.       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     Denies f/c/ns.  Had some unintentional weight loss from December till now - about 20 lbs before stent was placed - due to abdominal pain and diarrhea.       Denies any known abnormal lymphadenopathy.       Interval History:  5/6/2024  Found with SEBASTIAN due to GI blood loss and was given 2 doses of Feraheme with last dose received on 3/26/2024.  With continued anemia - hgb 9.6 mcv 89, saturated iron 5%.  [facility] was hospitalized last Sunday for melena x 1 episode.  EGD done 4/30/2024 - did not find H. Pylori or evidence of bleeding.  Currently being followed by GI.  [facility] has no episodes since Sunday before last.  Only on baby asa now.     Interval  History:  2024 Received Feraheme infusions x 2 with last dose received on 6/3/2024.  On 7/15/2024 discharged from hospital originally admitted for shortness of breath and pain on legs with ambulation that resolves when stopping.  Is back on Xarelto and ASA and will be having watchman procedure done soon.  Stool is dark color.  Patient states that she did receive IV push iron in the hospital.  States he is currently having issues with constipation.  Has taken slowfe in the past and was unable to tolerate.    Interval History:  2024 Found with recurrent iron deficiency anemia and given feraheme infusions x 2 with last dose received on 7/3/2024.  Presents today to evaluate response.  Ferritin has normalized; however still with saturated iron is 6%. Hgb 10.4 g/dL, mcv 91, platelet count 148.  Leukocytopenia with absolute lymphocytopenia present.  Denies f/c/ns or unintentional weight loss.  Denies any known abnormal lymphadenopathy. Has chronic left shoulder pain and received a steroid injection about 3 weeks ago.   I have reviewed all of the patient's relevant lab work available in the medical record and have utilized this in my evaluation and management recommendations today.      Social History     Socioeconomic History    Marital status:      Spouse name: YAEL    Number of children: 2   Tobacco Use    Smoking status: Former     Current packs/day: 0.00     Average packs/day: 0.5 packs/day for 4.0 years (2.0 ttl pk-yrs)     Types: Cigarettes     Start date: 1968     Quit date: 1972     Years since quittin.2    Smokeless tobacco: Former     Types: Chew     Quit date: 1976   Substance and Sexual Activity    Alcohol use: Not Currently    Drug use: No    Sexual activity: Yes     Partners: Female   Social History Narrative    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        Family History   Problem Relation Name Age of Onset    Heart disease Mother      Heart disease Father      Heart disease Brother      Colon cancer Neg Hx         Past Surgical History:   Procedure Laterality Date    ABLATION OF ARRHYTHMOGENIC FOCUS FOR ATRIAL FIBRILLATION N/A 6/24/2024    Procedure: Ablation atrial fibrillation;  Surgeon: Horacio Huerta MD;  Location: [facility] EP LAB;  Service: Cardiology;  Laterality: N/A;  afib, PVI, RFA, BERNARD (cx if SR), ZIA, anes, MB, 3 Prep, 3 hours per Dr. Huerta    ANGIOGRAM, CORONARY, WITH LEFT HEART CATHETERIZATION N/A 8/27/2024    Procedure: Angiogram, Coronary, with Left Heart Cath;  Surgeon: Stanton Jefferson MD;  Location: [facility] CATH LAB;  Service: Cardiology;  Laterality: N/A;    ANGIOGRAM, EXTREMITY, UNILATERAL Left 1/4/2024    Procedure: ANGIOGRAM, EXTREMITY, UNILATERAL- Femoral / SMS Stent, Poss General;  Surgeon: ALEX Alfred III, MD;  Location: [facility] OR [address] [place] [address];  Service: Vascular;  Laterality: Left;  mGy : 2698.78  Gy.cm : 229.88  Contrast : 62ml  Fluro time : 13.8min    ARTERIOGRAPHY OF SUBCLAVIAN ARTERY  8/27/2024    Procedure: ARTERIOGRAM, SUBCLAVIAN;  Surgeon: Stanton Jefferson MD;  Location: [facility] CATH LAB;  Service: Cardiology;;    CARDIAC CATHETERIZATION      CARDIAC SURGERY      CARPAL TUNNEL RELEASE Left     CATARACT EXTRACTION      CATARACT EXTRACTION W/ INTRAOCULAR LENS  IMPLANT, BILATERAL  2008    CATHETERIZATION OF BOTH LEFT AND RIGHT HEART N/A 11/2/2018    Procedure: CATHETERIZATION, HEART, BOTH LEFT AND RIGHT;  Surgeon: Frank Gallardo MD;  Location: [facility] CATH LAB;  Service: Cardiology;  Laterality: N/A;    COLONOSCOPY  8/2013    COLONOSCOPY N/A 5/30/2016    Procedure: COLONOSCOPY;  Surgeon: Anna Tomas MD;  Location: [facility] ENDO;  Service: Endoscopy;  Laterality: N/A;    COLONOSCOPY N/A 7/17/2019    Procedure: COLONOSCOPY;  Surgeon: David Carter III, MD;  Location: [facility] ENDO;  Service: Endoscopy;  Laterality: N/A;    COLONOSCOPY  N/A 12/14/2023    Procedure: COLONOSCOPY;  Surgeon: Ama Barrera MD;  Location: [facility] ENDO;  Service: Endoscopy;  Laterality: N/A;    COLONOSCOPY N/A 7/27/2024    Procedure: COLONOSCOPY;  Surgeon: Ama Barrera MD;  Location: [facility] ENDO;  Service: Endoscopy;  Laterality: N/A;    COMPUTED TOMOGRAPHY N/A 9/9/2019    Procedure: CT (COMPUTED TOMOGRAPHY);  Surgeon: [facility];  Location: [facility];  Service: General;  Laterality: N/A;  Microwave ablation to be done in CT.  Need CRNA and cart    COMPUTED TOMOGRAPHY N/A 1/25/2022    Procedure: Liver Microwave Ablation;  Surgeon: Red Puentse MD;  Location: [facility];  Service: General;  Laterality: N/A;    CORONARY ARTERY BYPASS GRAFT (CABG) N/A 11/5/2018    Procedure: CORONARY ARTERY BYPASS GRAFT (CABG);  Surgeon: Bear De Luna MD;  Location: [facility];  Service: Cardiothoracic;  Laterality: N/A;  TWO VESSEL BYPASS WITH AORTOTOMY AND EXCISION OF ATHEROSCLEROTIC PLAQUE    CORONARY BYPASS GRAFT ANGIOGRAPHY  3/2/2020    Procedure: Bypass graft study;  Surgeon: Cora Cormier MD;  Location: [facility] CATH LAB;  Service: Cardiology;;    CORONARY BYPASS GRAFT ANGIOGRAPHY  8/27/2024    Procedure: Bypass graft study;  Surgeon: Stanton Jefferson MD;  Location: [facility] CATH LAB;  Service: Cardiology;;    ECHOCARDIOGRAM,TRANSESOPHAGEAL N/A 6/24/2024    Procedure: Transesophageal echo (BERNARD) intra-procedure log documentation;  Surgeon: Nacho Downs MD;  Location: [facility] EP LAB;  Service: Cardiology;  Laterality: N/A;    ELBOW BURSA SURGERY Right 2010    ENDOSCOPIC HARVEST OF VEIN Left 11/5/2018    Procedure: SURGICAL PROCUREMENT, VEIN, ENDOSCOPIC;  Surgeon: Bear De Luna MD;  Location: [facility];  Service: Cardiothoracic;  Laterality: Left;    ESOPHAGOGASTRODUODENOSCOPY N/A 7/17/2019    Procedure: ESOPHAGOGASTRODUODENOSCOPY (EGD);  Surgeon: David Carter III, MD;  Location: [facility] ENDO;  Service: Endoscopy;  Laterality: N/A;    ESOPHAGOGASTRODUODENOSCOPY  N/A 12/29/2022    Procedure: ESOPHAGOGASTRODUODENOSCOPY (EGD);  Surgeon: Issa Gayle MD;  Location: [facility] ENDO;  Service: Endoscopy;  Laterality: N/A;    ESOPHAGOGASTRODUODENOSCOPY N/A 1/17/2024    Procedure: EGD (ESOPHAGOGASTRODUODENOSCOPY);  Surgeon: Issa Gayle MD;  Location: [facility] ENDO;  Service: Endoscopy;  Laterality: N/A;    ESOPHAGOGASTRODUODENOSCOPY N/A 4/30/2024    Procedure: EGD (ESOPHAGOGASTRODUODENOSCOPY);  Surgeon: Eder Foley MD;  Location: [facility] ENDO;  Service: Gastroenterology;  Laterality: N/A;    ESOPHAGOGASTRODUODENOSCOPY N/A 7/27/2024    Procedure: EGD (ESOPHAGOGASTRODUODENOSCOPY);  Surgeon: Ama Barrera MD;  Location: [facility] ENDO;  Service: Endoscopy;  Laterality: N/A;    ETHMOIDECTOMY Bilateral 3/27/2019    Procedure: ETHMOIDECTOMY;  Surgeon: Alejandro Parkinson MD;  Location: [facility] OR;  Service: ENT;  Laterality: Bilateral;    EYE SURGERY      FOOT SURGERY      FRONTAL SINUS OBLITERATION Bilateral 3/27/2019    Procedure: SINUSOTOMY, FRONTAL SINUS, OBLITERATIVE;  Surgeon: Alejandro Parkinson MD;  Location: [facility] OR;  Service: ENT;  Laterality: Bilateral;    FUNCTIONAL ENDOSCOPIC SINUS SURGERY (FESS) Bilateral 3/27/2019    Procedure: FESS (FUNCTIONAL ENDOSCOPIC SINUS SURGERY);  Surgeon: Alejandro Parkinson MD;  Location: [facility] OR;  Service: ENT;  Laterality: Bilateral;  Left Keila bullosa resection     INTRALUMINAL GASTROINTESTINAL TRACT IMAGING VIA CAPSULE N/A 2/2/2024    Procedure: IMAGING PROCEDURE, GI TRACT, INTRALUMINAL, VIA CAPSULE;  Surgeon: Cooper Estrella RN;  Location: [facility] ENDO;  Service: Endoscopy;  Laterality: N/A;    INTRALUMINAL GASTROINTESTINAL TRACT IMAGING VIA CAPSULE N/A 7/31/2024    Procedure: IMAGING PROCEDURE, GI TRACT, INTRALUMINAL, VIA CAPSULE;  Surgeon: Cooper Estrella RN;  Location: [facility] ENDO;  Service: Endoscopy;  Laterality: N/A;    KNEE ARTHROSCOPY W/ MENISCAL REPAIR Left 06/2019    dr boykin    KNEE SURGERY Right     LEFT HEART CATHETERIZATION Left 3/2/2020     Procedure: CATHETERIZATION, HEART, LEFT;  Surgeon: Cora Cormier MD;  Location: [facility] CATH LAB;  Service: Cardiology;  Laterality: Left;    LIVER BIOPSY  01/2022    MAXILLARY ANTROSTOMY Bilateral 3/27/2019    Procedure: MAXILLARY ANTROSTOMY;  Surgeon: Alejandro Parkinson MD;  Location: [facility];  Service: ENT;  Laterality: Bilateral;    NASAL SEPTOPLASTY N/A 3/27/2019    Procedure: SEPTOPLASTY, NOSE;  Surgeon: Alejandro Parkinson MD;  Location: [facility] OR;  Service: ENT;  Laterality: N/A;    RECTAL SURGERY  2012    RIGHT HEART CATHETERIZATION Right 8/27/2024    Procedure: INSERTION, CATHETER, RIGHT HEART;  Surgeon: Stanton Jefferson MD;  Location: [facility] CATH LAB;  Service: Cardiology;  Laterality: Right;    STENT, SUPERIOR MESENTERIC ARTERY Left 1/4/2024    Procedure: STENT, SUPERIOR MESENTERIC ARTERY;  Surgeon: ALEX Alfred III, MD;  Location: [address];  Service: Vascular;  Laterality: Left;    TRANSURETHRAL RESECTION OF PROSTATE (TURP) WITHOUT USE OF LASER N/A 6/19/2018    Procedure: TURP, WITHOUT USING LASER;  Surgeon: Cooper Cordova IV, MD;  Location: [facility];  Service: Urology;  Laterality: N/A;    TRIGGER FINGER RELEASE Left     VALVE STUDY-AORTIC  8/27/2024    Procedure: Valve study-aortic;  Surgeon: Stanton Jefferson MD;  Location: [facility] CATH LAB;  Service: Cardiology;;       Past Medical History:   Diagnosis Date    Anticoagulant long-term use     Aortic stenosis     Asthma     Benign prostatic hyperplasia with nocturia     Bilateral carotid artery stenosis 07/21/2021     CAROTID BILATERAL 07/14/2021 IMPRESSION: Atherosclerosis with suspected stenosis greater than 50% at the right proximal ICA visually. Velocities are discordant and appear improved from prior. Atherosclerosis on the left with no evidence of flow-limiting stenosis greater than 50%.  FINDINGS: Right: Internal Carotid Artery (ICA): Peak systolic velocity 118 cm/sec. End diastolic velocity 35 cm/sec    BPH (benign prostatic hyperplasia)     CAD  (coronary artery disease)     40% lesion 2002;[facility]    Cirrhosis     Claudication 04/09/2014    Colon polyp     COPD (chronic obstructive pulmonary disease)     ED (erectile dysfunction)     Encounter for blood transfusion     Ex-smoker     Hearing loss NEC     Hepatitis C     Cured;reji brown 2015    Hyperlipidemia     Hypertension     Hypothyroid     s/p tx graves    Open angle with borderline findings and low glaucoma risk in both eyes 09/22/2020    DELONTE on CPAP     Osteoarthritis     Paroxysmal atrial fibrillation     PVD (peripheral vascular disease)     Secondary esophageal varices without bleeding 06/26/2017    Type 2 diabetes mellitus        Review of Systems   Constitutional: Negative.    HENT: Negative.     Eyes: Negative.    Respiratory: Negative.     Cardiovascular: Negative.    Gastrointestinal: Negative.    Endocrine: Negative.    Genitourinary: Negative.    Musculoskeletal: Negative.    Skin: Negative.    Allergic/Immunologic: Negative.    Neurological: Negative.    Hematological: Negative.    Psychiatric/Behavioral: Negative.            Medication List with Changes/Refills   Current Medications    ACCU-CHEK GRACE PLUS METER MISC    AS DIRECTED    ALBUTEROL (PROVENTIL HFA) 90 MCG/ACTUATION INHALER    Inhale 2 puffs into the lungs every 6 (six) hours as needed for Wheezing. Rescue    AMLODIPINE (NORVASC) 5 MG TABLET    Take 1 tablet (5 mg total) by mouth once daily.    ASPIRIN (ECOTRIN) 81 MG EC TABLET    Take 1 tablet (81 mg total) by mouth once daily.    BLOOD SUGAR DIAGNOSTIC (ACCU-CHEK GRACE PLUS TEST STRP) STRP    TEST THREE TIMES A DAY    BUDESONIDE-FORMOTEROL 160-4.5 MCG (SYMBICORT) 160-4.5 MCG/ACTUATION HFAA    INHALE 2 PUFFS INTO THE LUNGS TWO TIMES A DAY.    CARVEDILOL (COREG) 3.125 MG TABLET    Take 1 tablet (3.125 mg total) by mouth 2 (two) times daily with meals.    EMPAGLIFLOZIN (JARDIANCE) 25 MG TABLET    Take 1 tablet (25 mg total) by mouth once daily.    FAMOTIDINE (PEPCID) 40  "MG TABLET    Take 40 mg by mouth once daily.    FINASTERIDE (PROSCAR) 5 MG TABLET    TAKE 1 TABLET BY MOUTH once daily    FUROSEMIDE (LASIX) 40 MG TABLET    Take 1 tablet (40 mg total) by mouth 2 (two) times daily.    GLUCOSAMINE HCL/CHONDR ROSARIO A NA (OSTEO BI-FLEX ORAL)    Take 1 tablet by mouth 2 (two) times daily.     INCLISIRAN (LEQVIO) 284 MG/1.5 ML SYRG SUBCUTANEOUS INJECTION    Inject 1.5 mLs (284 mg total) into the skin every 3 (three) months.    INCLISIRAN (LEQVIO) 284 MG/1.5 ML SYRG SUBCUTANEOUS INJECTION    Inject 1.5 mLs (284 mg total) into the skin every 6 (six) months.    ISOSORBIDE MONONITRATE (IMDUR) 30 MG 24 HR TABLET    Take 1 tablet (30 mg total) by mouth once daily.    KRILL  MG CAP    Take 1 capsule by mouth Daily.    LANCETS (ACCU-CHEK FASTCLIX LANCET DRUM) MISC    TEST TWO TIMES A DAY    LANTUS SOLOSTAR U-100 INSULIN 100 UNIT/ML (3 ML) INPN PEN    INJECT 44 UNITS UNDER THE SKIN EVERY EVENING    LEVOTHYROXINE (SYNTHROID) 300 MCG TAB    Take 1 tablet (300 mcg total) by mouth every morning.    LISINOPRIL 10 MG TABLET    Take 1 tablet by mouth once daily    METFORMIN (GLUCOPHAGE-XR) 500 MG ER 24HR TABLET    Take 1 tablet (500 mg total) by mouth 2 (two) times daily with meals.    MIRABEGRON (MYRBETRIQ) 25 MG TB24 ER TABLET    Take 1 tablet (25 mg total) by mouth once daily.    MONTELUKAST (SINGULAIR) 10 MG TABLET    Take 1 tablet (10 mg total) by mouth once daily.    PEN NEEDLE, DIABETIC (BD ULTRA-FINE MINI PEN NEEDLE) 31 GAUGE X 3/16" NDLE    USE AS DIRECTED    RABEPRAZOLE (ACIPHEX) 20 MG TABLET    Take 1 tablet (20 mg total) by mouth 2 (two) times daily.    RANOLAZINE (RANEXA) 1,000 MG TB12    Take 1 tablet (1,000 mg total) by mouth 2 (two) times daily.    REPATHA SURECLICK 140 MG/ML PNIJ    INJECT 140mg subcutaneously every 14 days    RIVAROXABAN (XARELTO) 20 MG TAB    Take 20 mg by mouth daily with dinner or evening meal.        Objective:     Vitals:    09/09/24 0849   BP: 129/74   Pulse: " 84       Physical Exam  Vitals reviewed.   Constitutional:       Appearance: Normal appearance. He is obese.   HENT:      Head: Normocephalic and atraumatic.      Right Ear: External ear normal.      Left Ear: External ear normal.   Cardiovascular:      Rate and Rhythm: Normal rate and regular rhythm.      Heart sounds: Normal heart sounds, S1 normal and S2 normal.   Pulmonary:      Effort: Pulmonary effort is normal.      Breath sounds: Normal breath sounds.   Abdominal:      General: There is no distension.   Musculoskeletal:         General: Normal range of motion.      Cervical back: Normal range of motion.   Skin:     General: Skin is warm and dry.   Neurological:      General: No focal deficit present.      Mental Status: He is alert and oriented to person, place, and time.   Psychiatric:         Attention and Perception: Attention and perception normal.         Mood and Affect: Mood and affect normal.         Speech: Speech normal.         Behavior: Behavior normal. Behavior is cooperative.         Thought Content: Thought content normal.         Cognition and Memory: Cognition and memory normal.         Judgment: Judgment normal.         Assessment:     Problem List Items Addressed This Visit          Oncology    Anemia    Relevant Orders    Capsule Video Endoscopy       GI    AVM (arteriovenous malformation) of small bowel, acquired with hemorrhage - Primary    Relevant Orders    Capsule Video Endoscopy       Lab Results   Component Value Date    WBC 3.49 (L) 09/05/2024    RBC 3.56 (L) 09/05/2024    HGB 10.4 (L) 09/05/2024    HCT 32.3 (L) 09/05/2024    MCV 91 09/05/2024    MCH 29.2 09/05/2024    MCHC 32.2 09/05/2024    RDW 17.1 (H) 09/05/2024     (L) 09/05/2024    MPV 9.8 09/05/2024    GRAN 2.1 09/05/2024    GRAN 60.9 09/05/2024    LYMPH 0.7 (L) 09/05/2024    LYMPH 19.5 09/05/2024    MONO 0.5 09/05/2024    MONO 13.8 09/05/2024    EOS 0.1 09/05/2024    BASO 0.03 09/05/2024    EOSINOPHIL 2.3 09/05/2024     BASOPHIL 0.9 09/05/2024      Lab Results   Component Value Date     09/05/2024    K 4.8 09/05/2024     09/05/2024    CO2 23 09/05/2024    BUN 14 09/05/2024    CREATININE 1.0 09/05/2024    CALCIUM 8.8 09/05/2024    ANIONGAP 10 09/05/2024    ESTGFRAFRICA >60.0 06/28/2022    EGFRNONAA >60.0 06/28/2022     Lab Results   Component Value Date    ALT 18 09/05/2024    AST 19 09/05/2024     (H) 07/28/2008    ALKPHOS 76 09/05/2024    BILITOT 0.2 09/05/2024     Lab Results   Component Value Date    IRON 24 (L) 09/05/2024    TRANSFERRIN 264 09/05/2024    TIBC 391 09/05/2024    FESATURATED 6 (L) 09/05/2024    FERRITIN 46 09/05/2024        Plan:   AVM (arteriovenous malformation) of small bowel, acquired with hemorrhage  -     Capsule Video Endoscopy; Future    Iron deficiency anemia, unspecified iron deficiency anemia type  -     Capsule Video Endoscopy; Future    Other orders  -     ferumoxytoL (FERAHEME) 510 mg in D5W 117 mL IVPB      Med Onc Chart Routing      Follow up with physician    Follow up with VASILIY . F/u in person at [place] - cbc, bmp, iron studies   Infusion scheduling note   schedule feraheme infusions x 2 at [facility] infusion   Injection scheduling note n/a   Labs   Scheduling:  Preferred lab: [facility]  Lab interval:  cbc, bmp, iron studies   Imaging   [facility] - siddharthd Cooper Estrella in endoscopy suite   Pharmacy appointment No pharmacy appointment needed      Other referrals       No additional referrals needed  n/a          Total time spent on encounter: 45 minutes    Collaborating Provider:  Dr. Antonio Pineda    Thank You,  Ophelia Bustillos, FNP-C  Benign Hematology

## 2024-09-12 ENCOUNTER — LAB VISIT (OUTPATIENT)
Dept: LAB | Facility: HOSPITAL | Age: 79
End: 2024-09-12
Attending: PEDIATRICS
Payer: MEDICARE

## 2024-09-12 DIAGNOSIS — D50.0 IRON DEFICIENCY ANEMIA DUE TO CHRONIC BLOOD LOSS: ICD-10-CM

## 2024-09-12 LAB
BASOPHILS # BLD AUTO: 0.04 K/UL (ref 0–0.2)
BASOPHILS NFR BLD: 0.8 % (ref 0–1.9)
DIFFERENTIAL METHOD BLD: ABNORMAL
EOSINOPHIL # BLD AUTO: 0.2 K/UL (ref 0–0.5)
EOSINOPHIL NFR BLD: 3 % (ref 0–8)
ERYTHROCYTE [DISTWIDTH] IN BLOOD BY AUTOMATED COUNT: 16.7 % (ref 11.5–14.5)
HCT VFR BLD AUTO: 36.7 % (ref 40–54)
HGB BLD-MCNC: 11.8 G/DL (ref 14–18)
IMM GRANULOCYTES # BLD AUTO: 0.01 K/UL (ref 0–0.04)
IMM GRANULOCYTES NFR BLD AUTO: 0.2 % (ref 0–0.5)
LYMPHOCYTES # BLD AUTO: 1.1 K/UL (ref 1–4.8)
LYMPHOCYTES NFR BLD: 22.1 % (ref 18–48)
MCH RBC QN AUTO: 28.5 PG (ref 27–31)
MCHC RBC AUTO-ENTMCNC: 32.2 G/DL (ref 32–36)
MCV RBC AUTO: 89 FL (ref 82–98)
MONOCYTES # BLD AUTO: 0.7 K/UL (ref 0.3–1)
MONOCYTES NFR BLD: 13.7 % (ref 4–15)
NEUTROPHILS # BLD AUTO: 3 K/UL (ref 1.8–7.7)
NEUTROPHILS NFR BLD: 60.2 % (ref 38–73)
NRBC BLD-RTO: 0 /100 WBC
PLATELET # BLD AUTO: 222 K/UL (ref 150–450)
PMV BLD AUTO: 10.3 FL (ref 9.2–12.9)
RBC # BLD AUTO: 4.14 M/UL (ref 4.6–6.2)
WBC # BLD AUTO: 4.97 K/UL (ref 3.9–12.7)

## 2024-09-12 PROCEDURE — 36415 COLL VENOUS BLD VENIPUNCTURE: CPT | Mod: PO | Performed by: PEDIATRICS

## 2024-09-12 PROCEDURE — 85025 COMPLETE CBC W/AUTO DIFF WBC: CPT | Performed by: PEDIATRICS

## 2024-09-18 ENCOUNTER — PATIENT MESSAGE (OUTPATIENT)
Dept: ELECTROPHYSIOLOGY | Facility: CLINIC | Age: 79
End: 2024-09-18
Payer: MEDICARE

## 2024-09-18 RX ORDER — PREDNISONE 50 MG/1
TABLET ORAL
Qty: 3 TABLET | Refills: 0 | Status: SHIPPED | OUTPATIENT
Start: 2024-09-18

## 2024-09-18 RX ORDER — DIPHENHYDRAMINE HCL 50 MG
CAPSULE ORAL
Qty: 3 CAPSULE | Refills: 0 | Status: SHIPPED | OUTPATIENT
Start: 2024-09-18

## 2024-09-18 RX ORDER — CIMETIDINE 300 MG/1
TABLET, FILM COATED ORAL
Qty: 3 TABLET | Refills: 0 | Status: SHIPPED | OUTPATIENT
Start: 2024-09-18

## 2024-09-23 ENCOUNTER — INFUSION (OUTPATIENT)
Dept: INFUSION THERAPY | Facility: HOSPITAL | Age: 79
End: 2024-09-23
Attending: NURSE PRACTITIONER
Payer: MEDICARE

## 2024-09-23 ENCOUNTER — PATIENT MESSAGE (OUTPATIENT)
Dept: INFUSION THERAPY | Facility: HOSPITAL | Age: 79
End: 2024-09-23
Payer: MEDICARE

## 2024-09-23 VITALS
SYSTOLIC BLOOD PRESSURE: 115 MMHG | HEART RATE: 81 BPM | OXYGEN SATURATION: 95 % | TEMPERATURE: 98 F | WEIGHT: 227.06 LBS | RESPIRATION RATE: 18 BRPM | BODY MASS INDEX: 35.56 KG/M2 | DIASTOLIC BLOOD PRESSURE: 70 MMHG

## 2024-09-23 DIAGNOSIS — D50.0 IRON DEFICIENCY ANEMIA DUE TO CHRONIC BLOOD LOSS: Primary | ICD-10-CM

## 2024-09-23 PROCEDURE — 96375 TX/PRO/DX INJ NEW DRUG ADDON: CPT

## 2024-09-23 PROCEDURE — 25000003 PHARM REV CODE 250: Performed by: NURSE PRACTITIONER

## 2024-09-23 PROCEDURE — 96374 THER/PROPH/DIAG INJ IV PUSH: CPT

## 2024-09-23 PROCEDURE — 63600175 PHARM REV CODE 636 W HCPCS: Mod: JZ,JG | Performed by: NURSE PRACTITIONER

## 2024-09-23 RX ORDER — DIPHENHYDRAMINE HYDROCHLORIDE 50 MG/ML
50 INJECTION INTRAMUSCULAR; INTRAVENOUS ONCE AS NEEDED
OUTPATIENT
Start: 2024-09-23

## 2024-09-23 RX ORDER — METHYLPREDNISOLONE SOD SUCC 125 MG
60 VIAL (EA) INJECTION
Status: COMPLETED | OUTPATIENT
Start: 2024-09-23 | End: 2024-09-23

## 2024-09-23 RX ORDER — METHYLPREDNISOLONE SOD SUCC 125 MG
60 VIAL (EA) INJECTION
Start: 2024-09-23

## 2024-09-23 RX ORDER — EPINEPHRINE 0.3 MG/.3ML
0.3 INJECTION SUBCUTANEOUS ONCE AS NEEDED
OUTPATIENT
Start: 2024-09-23

## 2024-09-23 RX ADMIN — FERUMOXYTOL 510 MG: 510 INJECTION INTRAVENOUS at 02:09

## 2024-09-23 RX ADMIN — METHYLPREDNISOLONE SODIUM SUCCINATE 60 MG: 125 INJECTION, POWDER, FOR SOLUTION INTRAMUSCULAR; INTRAVENOUS at 01:09

## 2024-09-23 NOTE — PLAN OF CARE
Problem: Adult Inpatient Plan of Care  Goal: Plan of Care Review  Outcome: Progressing  Flowsheets (Taken 9/23/2024 1418)  Plan of Care Reviewed With: patient  Goal: Patient-Specific Goal (Individualized)  Outcome: Progressing  Flowsheets (Taken 9/23/2024 1418)  Individualized Care Needs: feet elevated  Anxieties, Fears or Concerns: denies  Goal: Absence of Hospital-Acquired Illness or Injury  Outcome: Progressing  Intervention: Identify and Manage Fall Risk  Flowsheets (Taken 9/23/2024 1418)  Safety Promotion/Fall Prevention:   assistive device/personal item within reach   Fall Risk reviewed with patient/family   family expresses understanding of fall risk and prevention   in recliner, wheels locked   medications reviewed  Intervention: Prevent Infection  Flowsheets (Taken 9/23/2024 1418)  Infection Prevention:   environmental surveillance performed   equipment surfaces disinfected   hand hygiene promoted   personal protective equipment utilized  Goal: Optimal Comfort and Wellbeing  Outcome: Progressing  Intervention: Monitor Pain and Promote Comfort  Flowsheets (Taken 9/23/2024 1418)  Pain Management Interventions:   position adjusted   quiet environment facilitated   relaxation techniques promoted  Intervention: Provide Person-Centered Care  Flowsheets (Taken 9/23/2024 1418)  Trust Relationship/Rapport:   care explained   choices provided   emotional support provided   empathic listening provided   questions answered   questions encouraged   reassurance provided   thoughts/feelings acknowledged     Problem: Anemia  Goal: Anemia Symptom Improvement  Outcome: Progressing  Intervention: Monitor and Manage Anemia  Flowsheets (Taken 9/23/2024 1418)  Safety Promotion/Fall Prevention:   assistive device/personal item within reach   Fall Risk reviewed with patient/family   family expresses understanding of fall risk and prevention   in recliner, wheels locked   medications reviewed  Fatigue Management: frequent rest  breaks encouraged

## 2024-09-24 RX ORDER — AMLODIPINE BESYLATE 5 MG/1
5 TABLET ORAL DAILY
Qty: 30 TABLET | Refills: 0 | Status: SHIPPED | OUTPATIENT
Start: 2024-09-24 | End: 2024-10-24

## 2024-09-27 NOTE — TELEPHONE ENCOUNTER
Prescription refill request for carvedilol has been sent to Dr. Gallardo to be signed and sent to pharmacy.

## 2024-09-28 RX ORDER — CARVEDILOL 3.12 MG/1
3.12 TABLET ORAL 2 TIMES DAILY WITH MEALS
Qty: 60 TABLET | Refills: 6 | Status: SHIPPED | OUTPATIENT
Start: 2024-09-28 | End: 2025-09-28

## 2024-09-30 ENCOUNTER — INFUSION (OUTPATIENT)
Dept: INFUSION THERAPY | Facility: HOSPITAL | Age: 79
End: 2024-09-30
Attending: NURSE PRACTITIONER
Payer: MEDICARE

## 2024-09-30 VITALS
HEIGHT: 67 IN | RESPIRATION RATE: 18 BRPM | TEMPERATURE: 98 F | HEART RATE: 75 BPM | WEIGHT: 227.06 LBS | SYSTOLIC BLOOD PRESSURE: 130 MMHG | BODY MASS INDEX: 35.64 KG/M2 | DIASTOLIC BLOOD PRESSURE: 75 MMHG | OXYGEN SATURATION: 97 %

## 2024-09-30 DIAGNOSIS — D50.0 IRON DEFICIENCY ANEMIA DUE TO CHRONIC BLOOD LOSS: Primary | ICD-10-CM

## 2024-09-30 PROCEDURE — 63600175 PHARM REV CODE 636 W HCPCS: Mod: JZ,JG | Performed by: NURSE PRACTITIONER

## 2024-09-30 PROCEDURE — 96375 TX/PRO/DX INJ NEW DRUG ADDON: CPT

## 2024-09-30 PROCEDURE — 25000003 PHARM REV CODE 250: Performed by: NURSE PRACTITIONER

## 2024-09-30 PROCEDURE — 96374 THER/PROPH/DIAG INJ IV PUSH: CPT

## 2024-09-30 RX ORDER — METHYLPREDNISOLONE SOD SUCC 125 MG
60 VIAL (EA) INJECTION
Status: COMPLETED | OUTPATIENT
Start: 2024-09-30 | End: 2024-09-30

## 2024-09-30 RX ORDER — DIPHENHYDRAMINE HYDROCHLORIDE 50 MG/ML
50 INJECTION INTRAMUSCULAR; INTRAVENOUS ONCE AS NEEDED
OUTPATIENT
Start: 2024-09-30

## 2024-09-30 RX ORDER — METHYLPREDNISOLONE SOD SUCC 125 MG
60 VIAL (EA) INJECTION
Start: 2024-09-30

## 2024-09-30 RX ORDER — EPINEPHRINE 0.3 MG/.3ML
0.3 INJECTION SUBCUTANEOUS ONCE AS NEEDED
OUTPATIENT
Start: 2024-09-30

## 2024-09-30 RX ADMIN — METHYLPREDNISOLONE SODIUM SUCCINATE 60 MG: 125 INJECTION, POWDER, FOR SOLUTION INTRAMUSCULAR; INTRAVENOUS at 07:09

## 2024-09-30 RX ADMIN — FERUMOXYTOL 510 MG: 510 INJECTION INTRAVENOUS at 07:09

## 2024-10-01 ENCOUNTER — PATIENT MESSAGE (OUTPATIENT)
Dept: CARDIOLOGY | Facility: CLINIC | Age: 79
End: 2024-10-01
Payer: MEDICARE

## 2024-10-01 NOTE — PROGRESS NOTES
Subjective   Patient ID:  Erendira Pretty is a 79 y.o. male who presents for follow-up of Atrial Fibrillation      79 yoM CAD/CABG here for arrhythmia management.     3/24: AF noted 8/23. He was stared on rythmol but no rhythm control via CV attempt was made. He remained in AF 2/7/24, SR with long first degree AVB later in February. He had a vascular intervention in his abdomen 1/24 with subsequent GI bleeding later that month. He has been off OAC since the GI bleeding event. Xarelto was restarted    5/24: GI bleeding 4/27/24 with no source identified. Off xarelto since discharge. Remains in SR on rythmol with first degree AVB. Due for PVI 6/24/24, LAAO 6w later    Interval history: No sustained recurrence. LAAO scheduled 11/8/24. He is on aspirin alone.     CHADSVASC of 4  HAS BLED of 4    Echo 8/24:  ·  Left Ventricle: The left ventricle is normal in size. Normal wall thickness. Septal motion is consistent with post-operative status. There is normal systolic function with a visually estimated ejection fraction of 60 - 65%. Grade II diastolic dysfunction.  ·  Right Ventricle: Normal right ventricular cavity size. Wall thickness is normal. Systolic function is normal.  ·  Left Atrium: Left atrium is severely dilated.  ·  Aortic Valve: There is severe aortic valve sclerosis. Severely restricted motion. There is moderate to severe stenosis. Aortic valve area by VTI is 0.84 cm². Aortic valve peak velocity is 3.67 m/s. Mean gradient is 34 mmHg. The dimensionless index is 0.27.  ·  Mitral Valve: There is mild regurgitation.  ·  Tricuspid Valve: There is mild regurgitation.  ·  Pulmonary Artery: The estimated pulmonary artery systolic pressure is 37 mmHg.  ·  IVC/SVC: Intermediate venous pressure at 8 mmHg.    LHC 8/24:  ·  Patent LIMA TO LAD and SVG OM2. Non obstructive disease of RCA unchanged from the past.  ·  The RPAV lesion was 50% stenosed.  ·  The Mid LM to Dist LM lesion was 80% stenosed.  ·  The Dist LM to Ost  LAD lesion was 90% stenosed.  ·  The Ost Cx to Prox Cx lesion was 90% stenosed.  ·  The Mid Cx lesion was 100% stenosed.  ·  The pre-procedure left ventricular end diastolic pressure was 16.  ·  The estimated blood loss was none.  ·  There was two vessel coronary artery disease.  ·  The filling pressures mildly elevated.  ·  There was severe aortic valve stenosis.  ·  RADHA by Hakki equation was 1.02 cm2, pullback gradient of 38 mmhg, CO 6.36. Follow up/evaluate with structural cardiology.    Echo 2/24:  ·  Left Ventricle: The left ventricle is normal in size. Normal wall thickness. There is mild concentric hypertrophy. There is normal systolic function with a visually estimated ejection fraction of 60 - 65%. There is normal diastolic function.  ·  Right Ventricle: Normal right ventricular cavity size. Wall thickness is normal. Right ventricle wall motion  is normal. Systolic function is normal.  ·  Left Atrium: Left atrium is severely dilated.  ·  Aortic Valve: There is moderate to severe stenosis. Aortic valve area by VTI is 0.96 cm². Aortic valve peak velocity is 3.76 m/s. Mean gradient is 31 mmHg. The dimensionless index is 0.28. There is mild aortic regurgitation.  ·  Mitral Valve: There is mild regurgitation.  ·  Tricuspid Valve: There is moderate regurgitation.  ·  Pulmonary Artery: There is moderate pulmonary hypertension. The estimated pulmonary artery systolic pressure is 58 mmHg.  ·  IVC/SVC: Intermediate venous pressure at 8 mmHg.    My interpretation of the ECG is:    Past Medical History:  No date: Anticoagulant long-term use  No date: Aortic stenosis  No date: Asthma  No date: Benign prostatic hyperplasia with nocturia  07/21/2021: Bilateral carotid artery stenosis      Comment:  US CAROTID BILATERAL 07/14/2021 IMPRESSION:                Atherosclerosis with suspected stenosis greater than 50%                at the right proximal ICA visually. Velocities are                discordant and appear improved  from prior.                Atherosclerosis on the left with no evidence of                flow-limiting stenosis greater than 50%.  FINDINGS:                Right: Internal Carotid Artery (ICA): Peak systolic                velocity 118 cm/sec. End diastolic velocity 35 cm/sec  No date: BPH (benign prostatic hyperplasia)  No date: CAD (coronary artery disease)      Comment:  40% lesion 2002;lazo  No date: Cirrhosis  04/09/2014: Claudication  No date: Colon polyp  No date: COPD (chronic obstructive pulmonary disease)  No date: ED (erectile dysfunction)  No date: Encounter for blood transfusion  No date: Ex-smoker  No date: Hearing loss NEC  No date: Hepatitis C      Comment:  Cured;reji brown 2015  No date: Hyperlipidemia  No date: Hypertension  No date: Hypothyroid      Comment:  s/p tx graves  09/22/2020: Open angle with borderline findings and low glaucoma risk   in both eyes  No date: DELONTE on CPAP  No date: Osteoarthritis  No date: Paroxysmal atrial fibrillation  No date: PVD (peripheral vascular disease)  06/26/2017: Secondary esophageal varices without bleeding  No date: Type 2 diabetes mellitus    Past Surgical History:  6/24/2024: ABLATION OF ARRHYTHMOGENIC FOCUS FOR ATRIAL FIBRILLATION;   N/A      Comment:  Procedure: Ablation atrial fibrillation;  Surgeon:                Horacio Huerta MD;  Location: [facility] EP LAB;                 Service: Cardiology;  Laterality: N/A;  afib, PVI, RFA,                BERNARD (cx if SR), ZIA, anes, MB, 3 Prep, 3 hours per Dr. Huerta  8/27/2024: ANGIOGRAM, CORONARY, WITH LEFT HEART CATHETERIZATION; N/A      Comment:  Procedure: Angiogram, Coronary, with Left Heart Cath;                 Surgeon: Stanton Jefferson MD;  Location: [facility] CATH LAB;               Service: Cardiology;  Laterality: N/A;  1/4/2024: ANGIOGRAM, EXTREMITY, UNILATERAL; Left      Comment:  Procedure: ANGIOGRAM, EXTREMITY, UNILATERAL- Femoral /                SMS Stent, Poss General;   Surgeon: ALEX Alfred III, MD;  Location: [facility] OR 2ND FLR;  Service: Vascular;                 Laterality: Left;  mGy : 2698.78Gy.cm :                229.88Contrast : 62mlFluro time : 13.8min  8/27/2024: ARTERIOGRAPHY OF SUBCLAVIAN ARTERY      Comment:  Procedure: ARTERIOGRAM, SUBCLAVIAN;  Surgeon: Stanton Jefferson MD;  Location: [facility] CATH LAB;  Service:                Cardiology;;  No date: CARDIAC CATHETERIZATION  No date: CARDIAC SURGERY  No date: CARPAL TUNNEL RELEASE; Left  No date: CATARACT EXTRACTION  2008: CATARACT EXTRACTION W/ INTRAOCULAR LENS  IMPLANT, BILATERAL  11/2/2018: CATHETERIZATION OF BOTH LEFT AND RIGHT HEART; N/A      Comment:  Procedure: CATHETERIZATION, HEART, BOTH LEFT AND RIGHT;                Surgeon: Frank Gallardo MD;  Location: [facility] CATH                LAB;  Service: Cardiology;  Laterality: N/A;  8/2013: COLONOSCOPY  5/30/2016: COLONOSCOPY; N/A      Comment:  Procedure: COLONOSCOPY;  Surgeon: Anna Tomas MD;                 Location: [facility];  Service: Endoscopy;  Laterality:                N/A;  7/17/2019: COLONOSCOPY; N/A      Comment:  Procedure: COLONOSCOPY;  Surgeon: David Carter III, MD;  Location: [facility];  Service: Endoscopy;                 Laterality: N/A;  12/14/2023: COLONOSCOPY; N/A      Comment:  Procedure: COLONOSCOPY;  Surgeon: Ama Barrera MD;  Location: [facility];  Service: Endoscopy;                 Laterality: N/A;  7/27/2024: COLONOSCOPY; N/A      Comment:  Procedure: COLONOSCOPY;  Surgeon: Ama Barrera MD;  Location: [facility];  Service: Endoscopy;                 Laterality: N/A;  9/9/2019: COMPUTED TOMOGRAPHY; N/A      Comment:  Procedure: CT (COMPUTED TOMOGRAPHY);  Surgeon: Dos                Diagnostic Provider;  Location: [facility] OR;  Service:                General;  Laterality: N/A;  Microwave ablation to be done               in CT.Need CRNA and  cart  1/25/2022: COMPUTED TOMOGRAPHY; N/A      Comment:  Procedure: Liver Microwave Ablation;  Surgeon: Red Puentes MD;  Location: [facility];  Service: General;                 Laterality: N/A;  11/5/2018: CORONARY ARTERY BYPASS GRAFT (CABG); N/A      Comment:  Procedure: CORONARY ARTERY BYPASS GRAFT (CABG);                 Surgeon: Bear De Luna MD;  Location: [facility];                 Service: Cardiothoracic;  Laterality: N/A;  TWO VESSEL                BYPASS WITH AORTOTOMY AND EXCISION OF ATHEROSCLEROTIC                PLAQUE  3/2/2020: CORONARY BYPASS GRAFT ANGIOGRAPHY      Comment:  Procedure: Bypass graft study;  Surgeon: Cora Cormier MD;  Location: [facility] CATH LAB;  Service: Cardiology;;  8/27/2024: CORONARY BYPASS GRAFT ANGIOGRAPHY      Comment:  Procedure: Bypass graft study;  Surgeon: Stanton Jefferson MD;  Location: [facility] CATH LAB;  Service:                Cardiology;;  6/24/2024: ECHOCARDIOGRAM,TRANSESOPHAGEAL; N/A      Comment:  Procedure: Transesophageal echo (BERNARD) intra-procedure                log documentation;  Surgeon: Nacho Downs MD;                 Location: [facility] EP LAB;  Service: Cardiology;  Laterality:               N/A;  2010: ELBOW BURSA SURGERY; Right  11/5/2018: ENDOSCOPIC HARVEST OF VEIN; Left      Comment:  Procedure: SURGICAL PROCUREMENT, VEIN, ENDOSCOPIC;                 Surgeon: Bear De Luna MD;  Location: [facility];                 Service: Cardiothoracic;  Laterality: Left;  7/17/2019: ESOPHAGOGASTRODUODENOSCOPY; N/A      Comment:  Procedure: ESOPHAGOGASTRODUODENOSCOPY (EGD);  Surgeon:                David Carter III, MD;  Location: [facility];  Service:               Endoscopy;  Laterality: N/A;  12/29/2022: ESOPHAGOGASTRODUODENOSCOPY; N/A      Comment:  Procedure: ESOPHAGOGASTRODUODENOSCOPY (EGD);  Surgeon:                Issa Gayle MD;  Location: [facility];  Service:               Endoscopy;   Laterality: N/A;  1/17/2024: ESOPHAGOGASTRODUODENOSCOPY; N/A      Comment:  Procedure: EGD (ESOPHAGOGASTRODUODENOSCOPY);  Surgeon:                Issa Gayle MD;  Location: [facility];                 Service: Endoscopy;  Laterality: N/A;  4/30/2024: ESOPHAGOGASTRODUODENOSCOPY; N/A      Comment:  Procedure: EGD (ESOPHAGOGASTRODUODENOSCOPY);  Surgeon:                Eder Foley MD;  Location: [facility];  Service:                Gastroenterology;  Laterality: N/A;  7/27/2024: ESOPHAGOGASTRODUODENOSCOPY; N/A      Comment:  Procedure: EGD (ESOPHAGOGASTRODUODENOSCOPY);  Surgeon:                Ama Barrera MD;  Location: [facility];  Service:                Endoscopy;  Laterality: N/A;  3/27/2019: ETHMOIDECTOMY; Bilateral      Comment:  Procedure: ETHMOIDECTOMY;  Surgeon: Alejandro Parkinson MD;                 Location: [facility] OR;  Service: ENT;  Laterality: Bilateral;  No date: EYE SURGERY  No date: FOOT SURGERY  3/27/2019: FRONTAL SINUS OBLITERATION; Bilateral      Comment:  Procedure: SINUSOTOMY, FRONTAL SINUS, OBLITERATIVE;                 Surgeon: Alejandro Parkinson MD;  Location: [facility] OR;  Service:                ENT;  Laterality: Bilateral;  3/27/2019: FUNCTIONAL ENDOSCOPIC SINUS SURGERY (FESS); Bilateral      Comment:  Procedure: FESS (FUNCTIONAL ENDOSCOPIC SINUS SURGERY);                 Surgeon: Alejandro Parkinson MD;  Location: [facility] OR;  Service:                ENT;  Laterality: Bilateral;  Left Keila bullosa                resection   2/2/2024: INTRALUMINAL GASTROINTESTINAL TRACT IMAGING VIA CAPSULE; N/A      Comment:  Procedure: IMAGING PROCEDURE, GI TRACT, INTRALUMINAL,                VIA CAPSULE;  Surgeon: Cooper Estrella, RN;  Location: [facility]                ENDO;  Service: Endoscopy;  Laterality: N/A;  7/31/2024: INTRALUMINAL GASTROINTESTINAL TRACT IMAGING VIA CAPSULE; N/  A      Comment:  Procedure: IMAGING PROCEDURE, GI TRACT, INTRALUMINAL,                VIA CAPSULE;  Surgeon: Cooper Estrella RN;  Location:  [facility]                ENDO;  Service: Endoscopy;  Laterality: N/A;  06/2019: KNEE ARTHROSCOPY W/ MENISCAL REPAIR; Left      Comment:  dr boykin  No date: KNEE SURGERY; Right  3/2/2020: LEFT HEART CATHETERIZATION; Left      Comment:  Procedure: CATHETERIZATION, HEART, LEFT;  Surgeon: Cora Cormier MD;  Location: [facility] CATH LAB;  Service:                Cardiology;  Laterality: Left;  01/2022: LIVER BIOPSY  3/27/2019: MAXILLARY ANTROSTOMY; Bilateral      Comment:  Procedure: MAXILLARY ANTROSTOMY;  Surgeon: Alejandro Parkinson MD;  Location: [facility] OR;  Service: ENT;  Laterality:                Bilateral;  3/27/2019: NASAL SEPTOPLASTY; N/A      Comment:  Procedure: SEPTOPLASTY, NOSE;  Surgeon: Alejandro Parkinson MD;                 Location: [facility] OR;  Service: ENT;  Laterality: N/A;  2012: RECTAL SURGERY  8/27/2024: RIGHT HEART CATHETERIZATION; Right      Comment:  Procedure: INSERTION, CATHETER, RIGHT HEART;  Surgeon:                Stanton Jefferson MD;  Location: [facility] CATH LAB;                 Service: Cardiology;  Laterality: Right;  1/4/2024: STENT, SUPERIOR MESENTERIC ARTERY; Left      Comment:  Procedure: STENT, SUPERIOR MESENTERIC ARTERY;  Surgeon:                ALEX Alfred III, MD;  Location: [facility] OR [address] [place] [address];                 Service: Vascular;  Laterality: Left;  6/19/2018: TRANSURETHRAL RESECTION OF PROSTATE (TURP) WITHOUT USE OF   LASER; N/A      Comment:  Procedure: TURP, WITHOUT USING LASER;  Surgeon: Cooper Cordova IV, MD;  Location: [facility] OR;  Service: Urology;                 Laterality: N/A;  No date: TRIGGER FINGER RELEASE; Left  8/27/2024: VALVE STUDY-AORTIC      Comment:  Procedure: Valve study-aortic;  Surgeon: Stanton Jefferson MD;  Location: [facility] CATH LAB;  Service:                Cardiology;;    Social History    Socioeconomic History      Marital status:       Spouse name: YAEL      Number of children: 2    Tobacco Use      Smoking  status: Former        Packs/day: 0.00        Years: 0.5 packs/day for 4.0 years (2.0 ttl pk-yrs)        Types: Cigarettes        Start date: 1968        Quit date: 1972        Years since quittin.3      Smokeless tobacco: Former        Types: Chew        Quit date: 1976    Substance and Sexual Activity      Alcohol use: Not Currently      Drug use: No      Sexual activity: Yes        Partners: Female    Social History Narrative      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       wife passed away       No pets or smokers in household, lives alone.      Review of patient's family history indicates:  Problem: Heart disease      Relation: Mother          Name:               Age of Onset: (Not Specified)  Problem: Heart disease      Relation: Father          Name:               Age of Onset: (Not Specified)  Problem: Heart disease      Relation: Brother          Name:               Age of Onset: (Not Specified)  Problem: Colon cancer      Relation: Neg Hx          Name:               Age of Onset: (Not Specified)      Current Outpatient Medications:  ACCU-CHEK GRACE PLUS METER Misc, AS DIRECTED, Disp: 1 each, Rfl: 0  albuterol (PROVENTIL HFA) 90 mcg/actuation inhaler, Inhale 2 puffs into the lungs every 6 (six) hours as needed for Wheezing. Rescue, Disp: , Rfl:   amLODIPine (NORVASC) 5 MG tablet, Take 1 tablet (5 mg total) by mouth once daily., Disp: 30 tablet, Rfl: 0  aspirin (ECOTRIN) 81 MG EC tablet, Take 1 tablet (81 mg total) by mouth once daily., Disp: 90 tablet, Rfl: 3  blood sugar diagnostic (ACCU-CHEK GRACE PLUS TEST STRP) Strp, TEST THREE TIMES A DAY, Disp: 100 strip, Rfl: 11  budesonide-formoterol 160-4.5 mcg (SYMBICORT) 160-4.5 mcg/actuation HFAA, INHALE 2 PUFFS INTO THE LUNGS TWO TIMES A DAY. (Patient taking differently: Inhale 2 puffs into the lungs every 12 (twelve) hours. INHALE 2 PUFFS INTO THE LUNGS TWO TIMES A DAY.), Disp: 10.2 g, Rfl:  11  carvediloL (COREG) 3.125 MG tablet, Take 1 tablet (3.125 mg total) by mouth 2 (two) times daily with meals., Disp: 60 tablet, Rfl: 6  cimetidine (TAGAMET) 300 MG tablet, Take one tablet by mouth on 11/7/2024 at 4:15 pm, one tablet on 11/7/2024 at 10:15 pm, and then one tablet on 11/8/2024 at 5:15 am for pre-procedure, Disp: 3 tablet, Rfl: 0  diphenhydrAMINE (BENADRYL) 50 MG capsule, Take one tablet by mouth on 11/7/2024 at 4:15 pm, one tablet on 11/7/2024 at 10:15 pm, and then one tablet on 11/8/2024 at 5:15 am for pre-procedure, Disp: 3 capsule, Rfl: 0  empagliflozin (JARDIANCE) 25 mg tablet, Take 1 tablet (25 mg total) by mouth once daily., Disp: 90 tablet, Rfl: 3  famotidine (PEPCID) 40 MG tablet, Take 40 mg by mouth once daily., Disp: , Rfl:   finasteride (PROSCAR) 5 mg tablet, TAKE 1 TABLET BY MOUTH once daily, Disp: 90 tablet, Rfl: 3  furosemide (LASIX) 40 MG tablet, Take 1 tablet (40 mg total) by mouth 2 (two) times daily., Disp: 60 tablet, Rfl: 11  GLUCOSAMINE HCL/CHONDR ROSARIO A NA (OSTEO BI-FLEX ORAL), Take 1 tablet by mouth 2 (two) times daily. , Disp: , Rfl:   inclisiran (LEQVIO) 284 mg/1.5 mL Syrg subcutaneous injection, Inject 1.5 mLs (284 mg total) into the skin every 3 (three) months., Disp: 1.5 mL, Rfl: 1  inclisiran (LEQVIO) 284 mg/1.5 mL Syrg subcutaneous injection, Inject 1.5 mLs (284 mg total) into the skin every 6 (six) months., Disp: 1.5 mL, Rfl: 1  isosorbide mononitrate (IMDUR) 30 MG 24 hr tablet, Take 1 tablet (30 mg total) by mouth once daily., Disp: 30 tablet, Rfl: 0  krill oil 500 mg Cap, Take 1 capsule by mouth Daily., Disp: , Rfl:   lancets (ACCU-CHEK FASTCLIX LANCET DRUM) Misc, TEST TWO TIMES A DAY, Disp: 200 each, Rfl: 5  LANTUS SOLOSTAR U-100 INSULIN 100 unit/mL (3 mL) InPn pen, INJECT 44 UNITS UNDER THE SKIN EVERY EVENING, Disp: 45 mL, Rfl: 0  levothyroxine (SYNTHROID) 300 MCG Tab, Take 1 tablet (300 mcg total) by mouth every morning., Disp: 90 tablet, Rfl: 3  lisinopriL 10 MG  "tablet, Take 1 tablet by mouth once daily, Disp: 90 tablet, Rfl: 3  metFORMIN (GLUCOPHAGE-XR) 500 MG ER 24hr tablet, Take 1 tablet (500 mg total) by mouth 2 (two) times daily with meals., Disp: 180 tablet, Rfl: 3  mirabegron (MYRBETRIQ) 25 mg Tb24 ER tablet, Take 1 tablet (25 mg total) by mouth once daily., Disp: 30 tablet, Rfl: 11  montelukast (SINGULAIR) 10 mg tablet, Take 1 tablet (10 mg total) by mouth once daily., Disp: 90 tablet, Rfl: 3  pen needle, diabetic (BD ULTRA-FINE MINI PEN NEEDLE) 31 gauge x 3/16" Ndle, USE AS DIRECTED, Disp: 100 each, Rfl: 11  predniSONE (DELTASONE) 50 MG Tab, Take one tablet by mouth on 11/7/2024 at 4:15 pm, one tablet on 11/7/2024 at 10:15 pm, and then one tablet on 11/8/2024 at 5:15 am for pre-procedure, Disp: 3 tablet, Rfl: 0  RABEprazole (ACIPHEX) 20 mg tablet, Take 1 tablet (20 mg total) by mouth 2 (two) times daily., Disp: 180 tablet, Rfl: 3  ranolazine (RANEXA) 1,000 mg Tb12, Take 1 tablet (1,000 mg total) by mouth 2 (two) times daily., Disp: 60 tablet, Rfl: 2  REPATHA SURECLICK 140 mg/mL PnIj, INJECT 140mg subcutaneously every 14 days (Patient taking differently: Inject 140 mg into the skin every 14 (fourteen) days.), Disp: 2 mL, Rfl: 11  rivaroxaban (XARELTO) 20 mg Tab, Take 20 mg by mouth daily with dinner or evening meal., Disp: , Rfl:     No current facility-administered medications for this visit.  Facility-Administered Medications Ordered in Other Visits:  lactated ringers infusion, , Intravenous, Continuous, Omaira Theodore MD, New Bag at 09/09/19 1117          Review of Systems   Constitutional: Negative. Negative for weight gain.   HENT: Negative.     Eyes: Negative.    Cardiovascular: Negative.  Negative for chest pain, leg swelling and palpitations.   Respiratory: Negative.  Negative for shortness of breath.    Endocrine: Negative.    Hematologic/Lymphatic: Negative.    Skin: Negative.    Musculoskeletal:  Negative for muscle weakness.   Gastrointestinal: " Negative.    Genitourinary: Negative.    Neurological: Negative.  Negative for dizziness.   Psychiatric/Behavioral: Negative.     Allergic/Immunologic: Negative.    All other systems reviewed and are negative.         Objective     Physical Exam  Vitals and nursing note reviewed.   Constitutional:       Appearance: He is well-developed.   HENT:      Head: Normocephalic and atraumatic.   Eyes:      Conjunctiva/sclera: Conjunctivae normal.      Pupils: Pupils are equal, round, and reactive to light.   Cardiovascular:      Rate and Rhythm: Normal rate and regular rhythm.      Pulses: Intact distal pulses.      Heart sounds: Normal heart sounds. Murmur: 2/6 AS murmur.   Pulmonary:      Effort: Pulmonary effort is normal.      Breath sounds: Normal breath sounds.   Abdominal:      General: Bowel sounds are normal.      Palpations: Abdomen is soft.   Musculoskeletal:      Cervical back: Normal range of motion and neck supple.   Skin:     General: Skin is warm and dry.   Neurological:      Mental Status: He is alert and oriented to person, place, and time.            Assessment and Plan     1. AVM (arteriovenous malformation) of small bowel, acquired with hemorrhage    2. Paroxysmal atrial fibrillation    3. S/P CABG x 2        Plan:  79 yoM here for LAAO discussion. We can use plavix only after his LAAO and then switch to aspirin afterwards. He is agreeable to this plan and wishes to proceed with LAAO 11/8/24. Extensive discussion regarding risks, benefits, and alternative approaches to the procedure was had with the patient.  The patient voices understanding and all questions have been answered.  The patient would like to proceed as planned.

## 2024-10-02 ENCOUNTER — OFFICE VISIT (OUTPATIENT)
Dept: CARDIOLOGY | Facility: CLINIC | Age: 79
End: 2024-10-02
Payer: MEDICARE

## 2024-10-02 VITALS
RESPIRATION RATE: 16 BRPM | HEART RATE: 72 BPM | WEIGHT: 224.88 LBS | HEIGHT: 67 IN | BODY MASS INDEX: 35.29 KG/M2 | DIASTOLIC BLOOD PRESSURE: 80 MMHG | SYSTOLIC BLOOD PRESSURE: 140 MMHG | OXYGEN SATURATION: 100 %

## 2024-10-02 DIAGNOSIS — I48.0 PAROXYSMAL ATRIAL FIBRILLATION: ICD-10-CM

## 2024-10-02 DIAGNOSIS — Z95.1 S/P CABG X 2: ICD-10-CM

## 2024-10-02 DIAGNOSIS — K55.21 AVM (ARTERIOVENOUS MALFORMATION) OF SMALL BOWEL, ACQUIRED WITH HEMORRHAGE: Primary | ICD-10-CM

## 2024-10-02 PROCEDURE — 1157F ADVNC CARE PLAN IN RCRD: CPT | Mod: CPTII,S$GLB,, | Performed by: INTERNAL MEDICINE

## 2024-10-02 PROCEDURE — 99214 OFFICE O/P EST MOD 30 MIN: CPT | Mod: S$GLB,,, | Performed by: INTERNAL MEDICINE

## 2024-10-02 PROCEDURE — 3288F FALL RISK ASSESSMENT DOCD: CPT | Mod: CPTII,S$GLB,, | Performed by: INTERNAL MEDICINE

## 2024-10-02 PROCEDURE — 99999 PR PBB SHADOW E&M-EST. PATIENT-LVL V: CPT | Mod: PBBFAC,,, | Performed by: INTERNAL MEDICINE

## 2024-10-02 PROCEDURE — 1159F MED LIST DOCD IN RCRD: CPT | Mod: CPTII,S$GLB,, | Performed by: INTERNAL MEDICINE

## 2024-10-02 PROCEDURE — 3079F DIAST BP 80-89 MM HG: CPT | Mod: CPTII,S$GLB,, | Performed by: INTERNAL MEDICINE

## 2024-10-02 PROCEDURE — 3072F LOW RISK FOR RETINOPATHY: CPT | Mod: CPTII,S$GLB,, | Performed by: INTERNAL MEDICINE

## 2024-10-02 PROCEDURE — 1101F PT FALLS ASSESS-DOCD LE1/YR: CPT | Mod: CPTII,S$GLB,, | Performed by: INTERNAL MEDICINE

## 2024-10-02 PROCEDURE — 3077F SYST BP >= 140 MM HG: CPT | Mod: CPTII,S$GLB,, | Performed by: INTERNAL MEDICINE

## 2024-10-03 ENCOUNTER — HOSPITAL ENCOUNTER (OUTPATIENT)
Dept: RADIOLOGY | Facility: HOSPITAL | Age: 79
Discharge: HOME | End: 2024-10-03
Attending: INTERNAL MEDICINE
Payer: MEDICARE

## 2024-10-03 DIAGNOSIS — K74.69 OTHER CIRRHOSIS OF LIVER: ICD-10-CM

## 2024-10-03 DIAGNOSIS — C22.0 HCC (HEPATOCELLULAR CARCINOMA): ICD-10-CM

## 2024-10-03 PROCEDURE — 76705 ECHO EXAM OF ABDOMEN: CPT | Mod: TC,PO

## 2024-10-03 PROCEDURE — 76705 ECHO EXAM OF ABDOMEN: CPT | Mod: 26,,, | Performed by: RADIOLOGY

## 2024-10-09 ENCOUNTER — OFFICE VISIT (OUTPATIENT)
Dept: HEPATOLOGY | Facility: CLINIC | Age: 79
End: 2024-10-09
Payer: MEDICARE

## 2024-10-09 ENCOUNTER — LAB VISIT (OUTPATIENT)
Dept: LAB | Facility: HOSPITAL | Age: 79
End: 2024-10-09
Attending: PEDIATRICS
Payer: MEDICARE

## 2024-10-09 VITALS
SYSTOLIC BLOOD PRESSURE: 146 MMHG | HEART RATE: 82 BPM | WEIGHT: 223.13 LBS | HEIGHT: 67 IN | DIASTOLIC BLOOD PRESSURE: 70 MMHG | BODY MASS INDEX: 35.02 KG/M2

## 2024-10-09 DIAGNOSIS — E03.9 ACQUIRED HYPOTHYROIDISM: Chronic | ICD-10-CM

## 2024-10-09 DIAGNOSIS — Z79.4 TYPE 2 DIABETES MELLITUS WITH MICROALBUMINURIA, WITH LONG-TERM CURRENT USE OF INSULIN: Chronic | ICD-10-CM

## 2024-10-09 DIAGNOSIS — D64.9 ANEMIA, UNSPECIFIED TYPE: ICD-10-CM

## 2024-10-09 DIAGNOSIS — K74.69 OTHER CIRRHOSIS OF LIVER: Primary | ICD-10-CM

## 2024-10-09 DIAGNOSIS — E11.29 TYPE 2 DIABETES MELLITUS WITH MICROALBUMINURIA, WITH LONG-TERM CURRENT USE OF INSULIN: Chronic | ICD-10-CM

## 2024-10-09 DIAGNOSIS — C22.0 HCC (HEPATOCELLULAR CARCINOMA): ICD-10-CM

## 2024-10-09 DIAGNOSIS — R80.9 TYPE 2 DIABETES MELLITUS WITH MICROALBUMINURIA, WITH LONG-TERM CURRENT USE OF INSULIN: Chronic | ICD-10-CM

## 2024-10-09 LAB
ALBUMIN SERPL BCP-MCNC: 3.7 G/DL (ref 3.5–5.2)
ALBUMIN/CREAT UR: 23.2 UG/MG (ref 0–30)
ALP SERPL-CCNC: 75 U/L (ref 55–135)
ALT SERPL W/O P-5'-P-CCNC: 23 U/L (ref 10–44)
ANION GAP SERPL CALC-SCNC: 8 MMOL/L (ref 8–16)
AST SERPL-CCNC: 25 U/L (ref 10–40)
BASOPHILS # BLD AUTO: 0.03 K/UL (ref 0–0.2)
BASOPHILS NFR BLD: 0.8 % (ref 0–1.9)
BILIRUB SERPL-MCNC: 0.5 MG/DL (ref 0.1–1)
BUN SERPL-MCNC: 17 MG/DL (ref 8–23)
CALCIUM SERPL-MCNC: 9.2 MG/DL (ref 8.7–10.5)
CHLORIDE SERPL-SCNC: 104 MMOL/L (ref 95–110)
CHOLEST SERPL-MCNC: 93 MG/DL (ref 120–199)
CHOLEST/HDLC SERPL: 1.7 {RATIO} (ref 2–5)
CO2 SERPL-SCNC: 28 MMOL/L (ref 23–29)
CREAT SERPL-MCNC: 1.1 MG/DL (ref 0.5–1.4)
CREAT UR-MCNC: 82 MG/DL (ref 23–375)
DIFFERENTIAL METHOD BLD: ABNORMAL
EOSINOPHIL # BLD AUTO: 0.2 K/UL (ref 0–0.5)
EOSINOPHIL NFR BLD: 3.8 % (ref 0–8)
ERYTHROCYTE [DISTWIDTH] IN BLOOD BY AUTOMATED COUNT: 20.7 % (ref 11.5–14.5)
EST. GFR  (NO RACE VARIABLE): >60 ML/MIN/1.73 M^2
ESTIMATED AVG GLUCOSE: 126 MG/DL (ref 68–131)
GLUCOSE SERPL-MCNC: 137 MG/DL (ref 70–110)
HBA1C MFR BLD: 6 % (ref 4–5.6)
HCT VFR BLD AUTO: 40.6 % (ref 40–54)
HDLC SERPL-MCNC: 54 MG/DL (ref 40–75)
HDLC SERPL: 58.1 % (ref 20–50)
HGB BLD-MCNC: 12.9 G/DL (ref 14–18)
IMM GRANULOCYTES # BLD AUTO: 0.02 K/UL (ref 0–0.04)
IMM GRANULOCYTES NFR BLD AUTO: 0.5 % (ref 0–0.5)
LDLC SERPL CALC-MCNC: 27.4 MG/DL (ref 63–159)
LYMPHOCYTES # BLD AUTO: 0.9 K/UL (ref 1–4.8)
LYMPHOCYTES NFR BLD: 23.3 % (ref 18–48)
MCH RBC QN AUTO: 29 PG (ref 27–31)
MCHC RBC AUTO-ENTMCNC: 31.8 G/DL (ref 32–36)
MCV RBC AUTO: 91 FL (ref 82–98)
MICROALBUMIN UR DL<=1MG/L-MCNC: 19 UG/ML
MONOCYTES # BLD AUTO: 0.7 K/UL (ref 0.3–1)
MONOCYTES NFR BLD: 16.5 % (ref 4–15)
NEUTROPHILS # BLD AUTO: 2.2 K/UL (ref 1.8–7.7)
NEUTROPHILS NFR BLD: 55.1 % (ref 38–73)
NONHDLC SERPL-MCNC: 39 MG/DL
NRBC BLD-RTO: 0 /100 WBC
PLATELET # BLD AUTO: 154 K/UL (ref 150–450)
PMV BLD AUTO: 10.6 FL (ref 9.2–12.9)
POTASSIUM SERPL-SCNC: 4.6 MMOL/L (ref 3.5–5.1)
PROT SERPL-MCNC: 7.3 G/DL (ref 6–8.4)
RBC # BLD AUTO: 4.45 M/UL (ref 4.6–6.2)
SODIUM SERPL-SCNC: 140 MMOL/L (ref 136–145)
T4 FREE SERPL-MCNC: 1.18 NG/DL (ref 0.71–1.51)
TRIGL SERPL-MCNC: 58 MG/DL (ref 30–150)
TSH SERPL DL<=0.005 MIU/L-ACNC: 0.32 UIU/ML (ref 0.4–4)
WBC # BLD AUTO: 3.95 K/UL (ref 3.9–12.7)

## 2024-10-09 PROCEDURE — 1160F RVW MEDS BY RX/DR IN RCRD: CPT | Mod: CPTII,S$GLB,, | Performed by: INTERNAL MEDICINE

## 2024-10-09 PROCEDURE — 83036 HEMOGLOBIN GLYCOSYLATED A1C: CPT | Performed by: PEDIATRICS

## 2024-10-09 PROCEDURE — 80061 LIPID PANEL: CPT | Performed by: PEDIATRICS

## 2024-10-09 PROCEDURE — 80053 COMPREHEN METABOLIC PANEL: CPT | Performed by: PEDIATRICS

## 2024-10-09 PROCEDURE — 82043 UR ALBUMIN QUANTITATIVE: CPT | Performed by: PEDIATRICS

## 2024-10-09 PROCEDURE — 3072F LOW RISK FOR RETINOPATHY: CPT | Mod: CPTII,S$GLB,, | Performed by: INTERNAL MEDICINE

## 2024-10-09 PROCEDURE — 3288F FALL RISK ASSESSMENT DOCD: CPT | Mod: CPTII,S$GLB,, | Performed by: INTERNAL MEDICINE

## 2024-10-09 PROCEDURE — 84443 ASSAY THYROID STIM HORMONE: CPT | Performed by: PEDIATRICS

## 2024-10-09 PROCEDURE — 99213 OFFICE O/P EST LOW 20 MIN: CPT | Mod: S$GLB,,, | Performed by: INTERNAL MEDICINE

## 2024-10-09 PROCEDURE — 36415 COLL VENOUS BLD VENIPUNCTURE: CPT | Mod: PO | Performed by: PEDIATRICS

## 2024-10-09 PROCEDURE — 3078F DIAST BP <80 MM HG: CPT | Mod: CPTII,S$GLB,, | Performed by: INTERNAL MEDICINE

## 2024-10-09 PROCEDURE — 1126F AMNT PAIN NOTED NONE PRSNT: CPT | Mod: CPTII,S$GLB,, | Performed by: INTERNAL MEDICINE

## 2024-10-09 PROCEDURE — 82570 ASSAY OF URINE CREATININE: CPT | Performed by: PEDIATRICS

## 2024-10-09 PROCEDURE — 1157F ADVNC CARE PLAN IN RCRD: CPT | Mod: CPTII,S$GLB,, | Performed by: INTERNAL MEDICINE

## 2024-10-09 PROCEDURE — 3077F SYST BP >= 140 MM HG: CPT | Mod: CPTII,S$GLB,, | Performed by: INTERNAL MEDICINE

## 2024-10-09 PROCEDURE — 99999 PR PBB SHADOW E&M-EST. PATIENT-LVL V: CPT | Mod: PBBFAC,,, | Performed by: INTERNAL MEDICINE

## 2024-10-09 PROCEDURE — 1101F PT FALLS ASSESS-DOCD LE1/YR: CPT | Mod: CPTII,S$GLB,, | Performed by: INTERNAL MEDICINE

## 2024-10-09 PROCEDURE — 85025 COMPLETE CBC W/AUTO DIFF WBC: CPT | Performed by: PEDIATRICS

## 2024-10-09 PROCEDURE — 84439 ASSAY OF FREE THYROXINE: CPT | Performed by: PEDIATRICS

## 2024-10-09 PROCEDURE — 1159F MED LIST DOCD IN RCRD: CPT | Mod: CPTII,S$GLB,, | Performed by: INTERNAL MEDICINE

## 2024-10-09 NOTE — PROGRESS NOTES
Subjective:     Erendira Pretty is here for follow up of cirrhosis    HPI  Since Erendira Pretty's last visit there have been no significant liver issues.  He has slowed down given his other medical issues.    No evidence of liver decompensation: no ascites, confusion or GI bleeding.    HCC s/p ablation 9/2019, 1/2022     Review of Systems    Objective:     Physical Exam  Vitals reviewed.   Constitutional:       General: He is not in acute distress.     Appearance: He is well-developed.   HENT:      Head: Normocephalic and atraumatic.      Mouth/Throat:      Pharynx: No oropharyngeal exudate.   Eyes:      General: No scleral icterus.        Right eye: No discharge.         Left eye: No discharge.      Conjunctiva/sclera: Conjunctivae normal.      Pupils: Pupils are equal, round, and reactive to light.   Pulmonary:      Effort: Pulmonary effort is normal. No respiratory distress.      Breath sounds: Normal breath sounds. No wheezing.   Abdominal:      General: There is no distension.      Palpations: Abdomen is soft.      Tenderness: There is no abdominal tenderness.   Musculoskeletal:      Right lower leg: No edema.      Left lower leg: No edema.   Neurological:      Mental Status: He is alert and oriented to person, place, and time.   Psychiatric:         Behavior: Behavior normal.         MELD 3.0: 6 at 10/3/2024  8:32 AM  MELD-Na: 6 at 10/3/2024  8:32 AM  Calculated from:  Serum Creatinine: 0.9 mg/dL (Using min of 1 mg/dL) at 10/3/2024  8:32 AM  Serum Sodium: 142 mmol/L (Using max of 137 mmol/L) at 10/3/2024  8:32 AM  Total Bilirubin: 0.4 mg/dL (Using min of 1 mg/dL) at 10/3/2024  8:32 AM  Serum Albumin: 3.7 g/dL (Using max of 3.5 g/dL) at 10/3/2024  8:32 AM  INR(ratio): 1 at 10/3/2024  8:32 AM  Age at listing (hypothetical): 79 years  Sex: Male at 10/3/2024  8:32 AM      WBC   Date Value Ref Range Status   10/03/2024 4.23 3.90 - 12.70 K/uL Final     Hemoglobin   Date Value Ref Range Status   10/03/2024 12.6 (L)  14.0 - 18.0 g/dL Final     POC Hematocrit   Date Value Ref Range Status   11/05/2018 26 (L) 36 - 54 %PCV Final     Hematocrit   Date Value Ref Range Status   10/03/2024 39.3 (L) 40.0 - 54.0 % Final     Platelets   Date Value Ref Range Status   10/03/2024 169 150 - 450 K/uL Final     BUN   Date Value Ref Range Status   10/03/2024 17 8 - 23 mg/dL Final     Creatinine   Date Value Ref Range Status   10/03/2024 0.9 0.5 - 1.4 mg/dL Final     Glucose   Date Value Ref Range Status   10/03/2024 124 (H) 70 - 110 mg/dL Final     Calcium   Date Value Ref Range Status   10/03/2024 9.2 8.7 - 10.5 mg/dL Final     Sodium   Date Value Ref Range Status   10/03/2024 142 136 - 145 mmol/L Final     Potassium   Date Value Ref Range Status   10/03/2024 3.9 3.5 - 5.1 mmol/L Final     Chloride   Date Value Ref Range Status   10/03/2024 103 95 - 110 mmol/L Final     Magnesium   Date Value Ref Range Status   07/16/2024 2.1 1.6 - 2.6 mg/dL Final     AST   Date Value Ref Range Status   10/03/2024 20 10 - 40 U/L Final     ALT   Date Value Ref Range Status   10/03/2024 16 10 - 44 U/L Final     Alkaline Phosphatase   Date Value Ref Range Status   10/03/2024 70 55 - 135 U/L Final     Total Bilirubin   Date Value Ref Range Status   10/03/2024 0.4 0.1 - 1.0 mg/dL Final     Comment:     For infants and newborns, interpretation of results should be based  on gestational age, weight and in agreement with clinical  observations.    Premature Infant recommended reference ranges:  Up to 24 hours.............<8.0 mg/dL  Up to 48 hours............<12.0 mg/dL  3-5 days..................<15.0 mg/dL  6-29 days.................<15.0 mg/dL       Albumin   Date Value Ref Range Status   10/03/2024 3.7 3.5 - 5.2 g/dL Final     INR   Date Value Ref Range Status   10/03/2024 1.0 0.8 - 1.2 Final     Comment:     Coumadin Therapy:  2.0 - 3.0 for INR for all indicators except mechanical heart valves  and antiphospholipid syndromes which should use 2.5 - 3.5.            Assessment/Plan:     1. Other cirrhosis of liver    2. HCC (hepatocellular carcinoma)      Erendira Pretty is a 79 y.o. male withCirrhosis and Hepatocellular Carcinoma      Other cirrhosis of liver- low MELD, well compensated  -continue to monitor meld score  -     MRI Abdomen W WO Contrast; Future; Expected date: 10/09/2024  -     Comprehensive Metabolic Panel; Future; Expected date: 10/09/2024  -     CBC Auto Differential; Future; Expected date: 10/09/2024  -     AFP Tumor Marker; Future; Expected date: 10/09/2024  -     Protime-INR; Future; Expected date: 10/09/2024    HCC (hepatocellular carcinoma)  -continue to monitor for HCC recurrence alternating MRI and ultrasound every 6 months  -Due for MRI next visit  -     MRI Abdomen W WO Contrast; Future; Expected date: 10/09/2024  -     AFP Tumor Marker; Future; Expected date: 10/09/2024    RTC in 6 months with preclinic labs and imaging    Anna Wood MD

## 2024-10-15 ENCOUNTER — OFFICE VISIT (OUTPATIENT)
Dept: GASTROENTEROLOGY | Facility: CLINIC | Age: 79
End: 2024-10-15
Payer: MEDICARE

## 2024-10-15 VITALS
WEIGHT: 226.63 LBS | SYSTOLIC BLOOD PRESSURE: 149 MMHG | DIASTOLIC BLOOD PRESSURE: 82 MMHG | HEIGHT: 67 IN | HEART RATE: 82 BPM | BODY MASS INDEX: 35.57 KG/M2

## 2024-10-15 DIAGNOSIS — D50.0 IRON DEFICIENCY ANEMIA DUE TO CHRONIC BLOOD LOSS: Primary | ICD-10-CM

## 2024-10-15 DIAGNOSIS — K55.21 AVM (ARTERIOVENOUS MALFORMATION) OF SMALL BOWEL, ACQUIRED WITH HEMORRHAGE: ICD-10-CM

## 2024-10-15 PROCEDURE — 1157F ADVNC CARE PLAN IN RCRD: CPT | Mod: CPTII,S$GLB,, | Performed by: NURSE PRACTITIONER

## 2024-10-15 PROCEDURE — 3072F LOW RISK FOR RETINOPATHY: CPT | Mod: CPTII,S$GLB,, | Performed by: NURSE PRACTITIONER

## 2024-10-15 PROCEDURE — 99213 OFFICE O/P EST LOW 20 MIN: CPT | Mod: S$GLB,,, | Performed by: NURSE PRACTITIONER

## 2024-10-15 PROCEDURE — 1159F MED LIST DOCD IN RCRD: CPT | Mod: CPTII,S$GLB,, | Performed by: NURSE PRACTITIONER

## 2024-10-15 PROCEDURE — 1126F AMNT PAIN NOTED NONE PRSNT: CPT | Mod: CPTII,S$GLB,, | Performed by: NURSE PRACTITIONER

## 2024-10-15 PROCEDURE — 1160F RVW MEDS BY RX/DR IN RCRD: CPT | Mod: CPTII,S$GLB,, | Performed by: NURSE PRACTITIONER

## 2024-10-15 PROCEDURE — 3077F SYST BP >= 140 MM HG: CPT | Mod: CPTII,S$GLB,, | Performed by: NURSE PRACTITIONER

## 2024-10-15 PROCEDURE — 99999 PR PBB SHADOW E&M-EST. PATIENT-LVL V: CPT | Mod: PBBFAC,,, | Performed by: NURSE PRACTITIONER

## 2024-10-15 PROCEDURE — 1101F PT FALLS ASSESS-DOCD LE1/YR: CPT | Mod: CPTII,S$GLB,, | Performed by: NURSE PRACTITIONER

## 2024-10-15 PROCEDURE — 3079F DIAST BP 80-89 MM HG: CPT | Mod: CPTII,S$GLB,, | Performed by: NURSE PRACTITIONER

## 2024-10-15 PROCEDURE — 3288F FALL RISK ASSESSMENT DOCD: CPT | Mod: CPTII,S$GLB,, | Performed by: NURSE PRACTITIONER

## 2024-10-15 NOTE — PROGRESS NOTES
Clinic Follow Up:  [facility] Follow Up Note    Reason for Follow Up:  The primary encounter diagnosis was Iron deficiency anemia due to chronic blood loss. A diagnosis of AVM (arteriovenous malformation) of small bowel, acquired with hemorrhage was also pertinent to this visit.    PCP: Bhupinder Callaway       HPI:  This is a 79 y.o. male here for follow up.   Has chronic SEBASTIAN-- Was on Xarelto, Plavix, and ASA 81 mg.   He was taken off these 2/2 GI bleed. He is currently on ASA 81 mg.   Watchman to be placed on 11/8/24. He will be on both Plavix and ASA for 6 weeks. He will then be able to discontinue it. His blood count is increasing. Last Hgb 12.9. Has received recent iron infusion.   He denies any overt GI bleeding. He says he will notice his stools get dark when there is bleeding. He will be watching stool closely after starting Plavix/ASA combo.     Review of Systems   Constitutional:  Negative for activity change and appetite change.        As per interval history above   Respiratory:  Negative for cough and shortness of breath.    Cardiovascular:  Negative for chest pain.   Gastrointestinal:  Negative for abdominal pain, blood in stool, constipation, diarrhea, nausea and vomiting.   Skin:  Negative for color change and rash.       Medical History:  Past Medical History:   Diagnosis Date    Anticoagulant long-term use     Aortic stenosis     Asthma     Benign prostatic hyperplasia with nocturia     Bilateral carotid artery stenosis 07/21/2021     CAROTID BILATERAL 07/14/2021 IMPRESSION: Atherosclerosis with suspected stenosis greater than 50% at the right proximal ICA visually. Velocities are discordant and appear improved from prior. Atherosclerosis on the left with no evidence of flow-limiting stenosis greater than 50%.  FINDINGS: Right: Internal Carotid Artery (ICA): Peak systolic velocity 118 cm/sec. End diastolic velocity 35 cm/sec    BPH (benign prostatic hyperplasia)     CAD  (coronary artery disease)     40% lesion 2002;[facility]    Cirrhosis     Claudication 04/09/2014    Colon polyp     COPD (chronic obstructive pulmonary disease)     ED (erectile dysfunction)     Encounter for blood transfusion     Ex-smoker     Hearing loss NEC     Hepatitis C     Cured;reji brown 2015    Hyperlipidemia     Hypertension     Hypothyroid     s/p tx graves    Open angle with borderline findings and low glaucoma risk in both eyes 09/22/2020    DELONTE on CPAP     Osteoarthritis     Paroxysmal atrial fibrillation     PVD (peripheral vascular disease)     Secondary esophageal varices without bleeding 06/26/2017    Type 2 diabetes mellitus        Surgical History:   Past Surgical History:   Procedure Laterality Date    ABLATION OF ARRHYTHMOGENIC FOCUS FOR ATRIAL FIBRILLATION N/A 6/24/2024    Procedure: Ablation atrial fibrillation;  Surgeon: Horacio Huerta MD;  Location: [facility] EP LAB;  Service: Cardiology;  Laterality: N/A;  afib, PVI, RFA, BERNARD (cx if SR), ZIA, anes, MB, 3 Prep, 3 hours per Dr. Huerta    ANGIOGRAM, CORONARY, WITH LEFT HEART CATHETERIZATION N/A 8/27/2024    Procedure: Angiogram, Coronary, with Left Heart Cath;  Surgeon: Stanton Jefferson MD;  Location: [facility] CATH LAB;  Service: Cardiology;  Laterality: N/A;    ANGIOGRAM, EXTREMITY, UNILATERAL Left 1/4/2024    Procedure: ANGIOGRAM, EXTREMITY, UNILATERAL- Femoral / SMS Stent, Poss General;  Surgeon: ALEX Alfred III, MD;  Location: [facility] OR [address] [place] [address];  Service: Vascular;  Laterality: Left;  mGy : 2698.78  Gy.cm : 229.88  Contrast : 62ml  Fluro time : 13.8min    ARTERIOGRAPHY OF SUBCLAVIAN ARTERY  8/27/2024    Procedure: ARTERIOGRAM, SUBCLAVIAN;  Surgeon: Stanton Jefferson MD;  Location: [facility] CATH LAB;  Service: Cardiology;;    CARDIAC CATHETERIZATION      CARDIAC SURGERY      CARPAL TUNNEL RELEASE Left     CATARACT EXTRACTION      CATARACT EXTRACTION W/ INTRAOCULAR LENS  IMPLANT, BILATERAL  2008    CATHETERIZATION OF BOTH LEFT AND  RIGHT HEART N/A 11/2/2018    Procedure: CATHETERIZATION, HEART, BOTH LEFT AND RIGHT;  Surgeon: Frank Gallardo MD;  Location: [facility] CATH LAB;  Service: Cardiology;  Laterality: N/A;    COLONOSCOPY  8/2013    COLONOSCOPY N/A 5/30/2016    Procedure: COLONOSCOPY;  Surgeon: Anna Tomas MD;  Location: [facility] ENDO;  Service: Endoscopy;  Laterality: N/A;    COLONOSCOPY N/A 7/17/2019    Procedure: COLONOSCOPY;  Surgeon: David Carter III, MD;  Location: [facility] ENDO;  Service: Endoscopy;  Laterality: N/A;    COLONOSCOPY N/A 12/14/2023    Procedure: COLONOSCOPY;  Surgeon: Ama Barrera MD;  Location: [facility] ENDO;  Service: Endoscopy;  Laterality: N/A;    COLONOSCOPY N/A 7/27/2024    Procedure: COLONOSCOPY;  Surgeon: Ama Barrera MD;  Location: [facility] ENDO;  Service: Endoscopy;  Laterality: N/A;    COMPUTED TOMOGRAPHY N/A 9/9/2019    Procedure: CT (COMPUTED TOMOGRAPHY);  Surgeon: [facility] Provider;  Location: [facility];  Service: General;  Laterality: N/A;  Microwave ablation to be done in CT.  Need CRNA and cart    COMPUTED TOMOGRAPHY N/A 1/25/2022    Procedure: Liver Microwave Ablation;  Surgeon: Red Puentes MD;  Location: [facility];  Service: General;  Laterality: N/A;    CORONARY ARTERY BYPASS GRAFT (CABG) N/A 11/5/2018    Procedure: CORONARY ARTERY BYPASS GRAFT (CABG);  Surgeon: Bear De Luna MD;  Location: [facility];  Service: Cardiothoracic;  Laterality: N/A;  TWO VESSEL BYPASS WITH AORTOTOMY AND EXCISION OF ATHEROSCLEROTIC PLAQUE    CORONARY BYPASS GRAFT ANGIOGRAPHY  3/2/2020    Procedure: Bypass graft study;  Surgeon: Cora Cormier MD;  Location: [facility] CATH LAB;  Service: Cardiology;;    CORONARY BYPASS GRAFT ANGIOGRAPHY  8/27/2024    Procedure: Bypass graft study;  Surgeon: Stanton Jefferson MD;  Location: [facility] CATH LAB;  Service: Cardiology;;    ECHOCARDIOGRAM,TRANSESOPHAGEAL N/A 6/24/2024    Procedure: Transesophageal echo (BERNARD) intra-procedure log documentation;  Surgeon: Nacho Downs  MD FAIZAN;  Location: [facility] EP LAB;  Service: Cardiology;  Laterality: N/A;    ELBOW BURSA SURGERY Right 2010    ENDOSCOPIC HARVEST OF VEIN Left 11/5/2018    Procedure: SURGICAL PROCUREMENT, VEIN, ENDOSCOPIC;  Surgeon: Bear De Luna MD;  Location: [facility];  Service: Cardiothoracic;  Laterality: Left;    ESOPHAGOGASTRODUODENOSCOPY N/A 7/17/2019    Procedure: ESOPHAGOGASTRODUODENOSCOPY (EGD);  Surgeon: David Carter III, MD;  Location: [facility];  Service: Endoscopy;  Laterality: N/A;    ESOPHAGOGASTRODUODENOSCOPY N/A 12/29/2022    Procedure: ESOPHAGOGASTRODUODENOSCOPY (EGD);  Surgeon: Issa Gayle MD;  Location: [facility];  Service: Endoscopy;  Laterality: N/A;    ESOPHAGOGASTRODUODENOSCOPY N/A 1/17/2024    Procedure: EGD (ESOPHAGOGASTRODUODENOSCOPY);  Surgeon: Issa Gayle MD;  Location: [facility];  Service: Endoscopy;  Laterality: N/A;    ESOPHAGOGASTRODUODENOSCOPY N/A 4/30/2024    Procedure: EGD (ESOPHAGOGASTRODUODENOSCOPY);  Surgeon: Eder Foley MD;  Location: [facility];  Service: Gastroenterology;  Laterality: N/A;    ESOPHAGOGASTRODUODENOSCOPY N/A 7/27/2024    Procedure: EGD (ESOPHAGOGASTRODUODENOSCOPY);  Surgeon: Ama Barrera MD;  Location: [facility];  Service: Endoscopy;  Laterality: N/A;    ETHMOIDECTOMY Bilateral 3/27/2019    Procedure: ETHMOIDECTOMY;  Surgeon: Alejandro Parkinson MD;  Location: [facility];  Service: ENT;  Laterality: Bilateral;    EYE SURGERY      FOOT SURGERY      FRONTAL SINUS OBLITERATION Bilateral 3/27/2019    Procedure: SINUSOTOMY, FRONTAL SINUS, OBLITERATIVE;  Surgeon: Alejandro Parkinson MD;  Location: [facility];  Service: ENT;  Laterality: Bilateral;    FUNCTIONAL ENDOSCOPIC SINUS SURGERY (FESS) Bilateral 3/27/2019    Procedure: FESS (FUNCTIONAL ENDOSCOPIC SINUS SURGERY);  Surgeon: Alejandro Parkinson MD;  Location: [facility];  Service: ENT;  Laterality: Bilateral;  Left Keial bullosa resection     INTRALUMINAL GASTROINTESTINAL TRACT IMAGING VIA CAPSULE N/A 2/2/2024     Procedure: IMAGING PROCEDURE, GI TRACT, INTRALUMINAL, VIA CAPSULE;  Surgeon: Cooper Estrella RN;  Location: [facility] ENDO;  Service: Endoscopy;  Laterality: N/A;    INTRALUMINAL GASTROINTESTINAL TRACT IMAGING VIA CAPSULE N/A 7/31/2024    Procedure: IMAGING PROCEDURE, GI TRACT, INTRALUMINAL, VIA CAPSULE;  Surgeon: Cooper Estrella RN;  Location: [facility] ENDO;  Service: Endoscopy;  Laterality: N/A;    KNEE ARTHROSCOPY W/ MENISCAL REPAIR Left 06/2019    dr boykin    KNEE SURGERY Right     LEFT HEART CATHETERIZATION Left 3/2/2020    Procedure: CATHETERIZATION, HEART, LEFT;  Surgeon: Cora Cormier MD;  Location: [facility] CATH LAB;  Service: Cardiology;  Laterality: Left;    LIVER BIOPSY  01/2022    MAXILLARY ANTROSTOMY Bilateral 3/27/2019    Procedure: MAXILLARY ANTROSTOMY;  Surgeon: Alejandro Parkinson MD;  Location: [facility];  Service: ENT;  Laterality: Bilateral;    NASAL SEPTOPLASTY N/A 3/27/2019    Procedure: SEPTOPLASTY, NOSE;  Surgeon: Alejandro Parkinson MD;  Location: [facility] OR;  Service: ENT;  Laterality: N/A;    RECTAL SURGERY  2012    RIGHT HEART CATHETERIZATION Right 8/27/2024    Procedure: INSERTION, CATHETER, RIGHT HEART;  Surgeon: Stanton Jefferson MD;  Location: [facility] CATH LAB;  Service: Cardiology;  Laterality: Right;    STENT, SUPERIOR MESENTERIC ARTERY Left 1/4/2024    Procedure: STENT, SUPERIOR MESENTERIC ARTERY;  Surgeon: ALEX Alfred III, MD;  Location: [address];  Service: Vascular;  Laterality: Left;    TRANSURETHRAL RESECTION OF PROSTATE (TURP) WITHOUT USE OF LASER N/A 6/19/2018    Procedure: TURP, WITHOUT USING LASER;  Surgeon: Cooper Cordova IV, MD;  Location: [facility] OR;  Service: Urology;  Laterality: N/A;    TRIGGER FINGER RELEASE Left     VALVE STUDY-AORTIC  8/27/2024    Procedure: Valve study-aortic;  Surgeon: Stanton Jefferson MD;  Location: [facility] CATH LAB;  Service: Cardiology;;       Family History:   Family History   Problem Relation Name Age of Onset    Heart disease Mother      Heart disease Father       Heart disease Brother      Colon cancer Neg Hx         Social History:   Social History     Tobacco Use    Smoking status: Former     Current packs/day: 0.00     Average packs/day: 0.5 packs/day for 4.0 years (2.0 ttl pk-yrs)     Types: Cigarettes     Start date: 1968     Quit date: 1972     Years since quittin.3    Smokeless tobacco: Former     Types: Chew     Quit date: 1976   Substance Use Topics    Alcohol use: Not Currently    Drug use: No       Allergies:   Review of patient's allergies indicates:   Allergen Reactions    Lipitor [atorvastatin] Other (See Comments)     Muscle aches and pains as well as fatigue.     Niacin preparations Other (See Comments)     Causes broken blood vessels and bruises at 4 times normal dose.    Statins-hmg-coa reductase inhibitors Other (See Comments)     Statins cause intolerable myalgias.    Iodinated contrast media Hives     Tachycardia    Omeprazole Hives and Itching    Prilosec [omeprazole magnesium] Hives and Itching       Home Medications:  Current Outpatient Medications on File Prior to Visit   Medication Sig Dispense Refill    ACCU-CHEK GRACE PLUS METER Misc AS DIRECTED 1 each 0    albuterol (PROVENTIL HFA) 90 mcg/actuation inhaler Inhale 2 puffs into the lungs every 6 (six) hours as needed for Wheezing. Rescue      amLODIPine (NORVASC) 5 MG tablet Take 1 tablet (5 mg total) by mouth once daily. 30 tablet 0    aspirin (ECOTRIN) 81 MG EC tablet Take 1 tablet (81 mg total) by mouth once daily. 90 tablet 3    blood sugar diagnostic (ACCU-CHEK GRACE PLUS TEST STRP) Strp TEST THREE TIMES A  strip 11    budesonide-formoterol 160-4.5 mcg (SYMBICORT) 160-4.5 mcg/actuation HFAA INHALE 2 PUFFS INTO THE LUNGS TWO TIMES A DAY. 10.2 g 11    carvediloL (COREG) 3.125 MG tablet Take 1 tablet (3.125 mg total) by mouth 2 (two) times daily with meals. 60 tablet 6    cimetidine (TAGAMET) 300 MG tablet Take one tablet by mouth on 2024 at 4:15 pm, one tablet on  "11/7/2024 at 10:15 pm, and then one tablet on 11/8/2024 at 5:15 am for pre-procedure 3 tablet 0    diphenhydrAMINE (BENADRYL) 50 MG capsule Take one tablet by mouth on 11/7/2024 at 4:15 pm, one tablet on 11/7/2024 at 10:15 pm, and then one tablet on 11/8/2024 at 5:15 am for pre-procedure 3 capsule 0    empagliflozin (JARDIANCE) 25 mg tablet Take 1 tablet (25 mg total) by mouth once daily. 90 tablet 3    famotidine (PEPCID) 40 MG tablet Take 40 mg by mouth once daily.      finasteride (PROSCAR) 5 mg tablet TAKE 1 TABLET BY MOUTH once daily 90 tablet 3    GLUCOSAMINE HCL/CHONDR ROSARIO A NA (OSTEO BI-FLEX ORAL) Take 1 tablet by mouth 2 (two) times daily.       inclisiran (LEQVIO) 284 mg/1.5 mL Syrg subcutaneous injection Inject 1.5 mLs (284 mg total) into the skin every 3 (three) months. 1.5 mL 1    inclisiran (LEQVIO) 284 mg/1.5 mL Syrg subcutaneous injection Inject 1.5 mLs (284 mg total) into the skin every 6 (six) months. 1.5 mL 1    krill oil 500 mg Cap Take 1 capsule by mouth Daily.      lancets (ACCU-CHEK FASTCLIX LANCET DRUM) Misc TEST TWO TIMES A  each 5    LANTUS SOLOSTAR U-100 INSULIN 100 unit/mL (3 mL) InPn pen INJECT 44 UNITS UNDER THE SKIN EVERY EVENING 45 mL 0    levothyroxine (SYNTHROID) 300 MCG Tab Take 1 tablet (300 mcg total) by mouth every morning. 90 tablet 3    lisinopriL 10 MG tablet Take 1 tablet by mouth once daily 90 tablet 3    metFORMIN (GLUCOPHAGE-XR) 500 MG ER 24hr tablet Take 1 tablet (500 mg total) by mouth 2 (two) times daily with meals. 180 tablet 3    mirabegron (MYRBETRIQ) 25 mg Tb24 ER tablet Take 1 tablet (25 mg total) by mouth once daily. 30 tablet 11    montelukast (SINGULAIR) 10 mg tablet Take 1 tablet (10 mg total) by mouth once daily. 90 tablet 3    pen needle, diabetic (BD ULTRA-FINE MINI PEN NEEDLE) 31 gauge x 3/16" Ndle USE AS DIRECTED 100 each 11    predniSONE (DELTASONE) 50 MG Tab Take one tablet by mouth on 11/7/2024 at 4:15 pm, one tablet on 11/7/2024 at 10:15 pm, and " "then one tablet on 11/8/2024 at 5:15 am for pre-procedure 3 tablet 0    RABEprazole (ACIPHEX) 20 mg tablet Take 1 tablet (20 mg total) by mouth 2 (two) times daily. 180 tablet 3    ranolazine (RANEXA) 1,000 mg Tb12 Take 1 tablet (1,000 mg total) by mouth 2 (two) times daily. 60 tablet 2    REPATHA SURECLICK 140 mg/mL PnIj INJECT 140mg subcutaneously every 14 days 2 mL 11    rivaroxaban (XARELTO) 20 mg Tab Take 20 mg by mouth daily with dinner or evening meal.      furosemide (LASIX) 40 MG tablet Take 1 tablet (40 mg total) by mouth 2 (two) times daily. 60 tablet 11    isosorbide mononitrate (IMDUR) 30 MG 24 hr tablet Take 1 tablet (30 mg total) by mouth once daily. 30 tablet 0     Current Facility-Administered Medications on File Prior to Visit   Medication Dose Route Frequency Provider Last Rate Last Admin    lactated ringers infusion   Intravenous Continuous Omaira Theodore MD   New Bag at 09/09/19 1117       BP (!) 149/82   Pulse 82   Ht 5' 7" (1.702 m)   Wt 102.8 kg (226 lb 10.1 oz)   BMI 35.50 kg/m²   Body mass index is 35.5 kg/m².  Physical Exam  Constitutional:       General: He is not in acute distress.  HENT:      Head: Normocephalic.   Neurological:      General: No focal deficit present.      Mental Status: He is alert.   Psychiatric:         Mood and Affect: Mood normal.         Judgment: Judgment normal.         Labs: Pertinent labs reviewed.    Assessment:   1. Iron deficiency anemia due to chronic blood loss    2. AVM (arteriovenous malformation) of small bowel, acquired with hemorrhage        Recommendations:   - he will notify me if develops overt bleeding or dip in blood count as he will have to be on Plavix and ASA x 6 weeks then can discontinue.   - he would like to hold off on any procedures right now as there he is not having any bleeding currently with increasing Hgb.     Iron deficiency anemia due to chronic blood loss    AVM (arteriovenous malformation) of small bowel, acquired " with hemorrhage    Return to Clinic:  Follow up if symptoms worsen or fail to improve.    Thank you for the opportunity to participate in the care of this patient.  LIDA Fay

## 2024-10-16 ENCOUNTER — OFFICE VISIT (OUTPATIENT)
Dept: INTERNAL MEDICINE | Facility: CLINIC | Age: 79
End: 2024-10-16
Payer: MEDICARE

## 2024-10-16 ENCOUNTER — TELEPHONE (OUTPATIENT)
Dept: OPHTHALMOLOGY | Facility: CLINIC | Age: 79
End: 2024-10-16
Payer: MEDICARE

## 2024-10-16 VITALS
SYSTOLIC BLOOD PRESSURE: 118 MMHG | HEIGHT: 67 IN | WEIGHT: 227.31 LBS | HEART RATE: 70 BPM | TEMPERATURE: 97 F | BODY MASS INDEX: 35.68 KG/M2 | OXYGEN SATURATION: 100 % | DIASTOLIC BLOOD PRESSURE: 76 MMHG

## 2024-10-16 DIAGNOSIS — E03.9 ACQUIRED HYPOTHYROIDISM: Chronic | ICD-10-CM

## 2024-10-16 DIAGNOSIS — Z95.1 S/P CABG X 2: ICD-10-CM

## 2024-10-16 DIAGNOSIS — E11.29 TYPE 2 DIABETES MELLITUS WITH MICROALBUMINURIA, WITH LONG-TERM CURRENT USE OF INSULIN: Chronic | ICD-10-CM

## 2024-10-16 DIAGNOSIS — E78.2 MIXED HYPERLIPIDEMIA: ICD-10-CM

## 2024-10-16 DIAGNOSIS — N52.9 ERECTILE DYSFUNCTION, UNSPECIFIED ERECTILE DYSFUNCTION TYPE: ICD-10-CM

## 2024-10-16 DIAGNOSIS — K21.00 GASTROESOPHAGEAL REFLUX DISEASE WITH ESOPHAGITIS WITHOUT HEMORRHAGE: ICD-10-CM

## 2024-10-16 DIAGNOSIS — Z79.4 TYPE 2 DIABETES MELLITUS WITH MICROALBUMINURIA, WITH LONG-TERM CURRENT USE OF INSULIN: Chronic | ICD-10-CM

## 2024-10-16 DIAGNOSIS — I10 ESSENTIAL HYPERTENSION: Primary | Chronic | ICD-10-CM

## 2024-10-16 DIAGNOSIS — N40.1 BENIGN PROSTATIC HYPERPLASIA WITH NOCTURIA: Chronic | ICD-10-CM

## 2024-10-16 DIAGNOSIS — J45.20 MILD INTERMITTENT ASTHMA WITHOUT COMPLICATION: ICD-10-CM

## 2024-10-16 DIAGNOSIS — R80.9 TYPE 2 DIABETES MELLITUS WITH MICROALBUMINURIA, WITH LONG-TERM CURRENT USE OF INSULIN: Chronic | ICD-10-CM

## 2024-10-16 DIAGNOSIS — I25.10 CORONARY ARTERY DISEASE INVOLVING NATIVE CORONARY ARTERY OF NATIVE HEART WITHOUT ANGINA PECTORIS: Chronic | ICD-10-CM

## 2024-10-16 DIAGNOSIS — G47.33 OSA ON CPAP: Chronic | ICD-10-CM

## 2024-10-16 DIAGNOSIS — I70.0 AORTIC ATHEROSCLEROSIS: ICD-10-CM

## 2024-10-16 DIAGNOSIS — R35.1 BENIGN PROSTATIC HYPERPLASIA WITH NOCTURIA: Chronic | ICD-10-CM

## 2024-10-16 DIAGNOSIS — D50.0 IRON DEFICIENCY ANEMIA DUE TO CHRONIC BLOOD LOSS: ICD-10-CM

## 2024-10-16 DIAGNOSIS — J30.1 NON-SEASONAL ALLERGIC RHINITIS DUE TO POLLEN: ICD-10-CM

## 2024-10-16 DIAGNOSIS — I48.0 PAROXYSMAL ATRIAL FIBRILLATION: ICD-10-CM

## 2024-10-16 PROCEDURE — G2211 COMPLEX E/M VISIT ADD ON: HCPCS | Mod: S$GLB,,, | Performed by: NURSE PRACTITIONER

## 2024-10-16 PROCEDURE — 1157F ADVNC CARE PLAN IN RCRD: CPT | Mod: CPTII,S$GLB,, | Performed by: NURSE PRACTITIONER

## 2024-10-16 PROCEDURE — 3072F LOW RISK FOR RETINOPATHY: CPT | Mod: CPTII,S$GLB,, | Performed by: NURSE PRACTITIONER

## 2024-10-16 PROCEDURE — 1126F AMNT PAIN NOTED NONE PRSNT: CPT | Mod: CPTII,S$GLB,, | Performed by: NURSE PRACTITIONER

## 2024-10-16 PROCEDURE — 1159F MED LIST DOCD IN RCRD: CPT | Mod: CPTII,S$GLB,, | Performed by: NURSE PRACTITIONER

## 2024-10-16 PROCEDURE — 1101F PT FALLS ASSESS-DOCD LE1/YR: CPT | Mod: CPTII,S$GLB,, | Performed by: NURSE PRACTITIONER

## 2024-10-16 PROCEDURE — 3074F SYST BP LT 130 MM HG: CPT | Mod: CPTII,S$GLB,, | Performed by: NURSE PRACTITIONER

## 2024-10-16 PROCEDURE — 3078F DIAST BP <80 MM HG: CPT | Mod: CPTII,S$GLB,, | Performed by: NURSE PRACTITIONER

## 2024-10-16 PROCEDURE — 3288F FALL RISK ASSESSMENT DOCD: CPT | Mod: CPTII,S$GLB,, | Performed by: NURSE PRACTITIONER

## 2024-10-16 PROCEDURE — 99214 OFFICE O/P EST MOD 30 MIN: CPT | Mod: S$GLB,,, | Performed by: NURSE PRACTITIONER

## 2024-10-16 PROCEDURE — 99999 PR PBB SHADOW E&M-EST. PATIENT-LVL IV: CPT | Mod: PBBFAC,,, | Performed by: NURSE PRACTITIONER

## 2024-10-16 RX ORDER — FUROSEMIDE 40 MG/1
40 TABLET ORAL 2 TIMES DAILY
Qty: 60 TABLET | Refills: 11 | Status: SHIPPED | OUTPATIENT
Start: 2024-10-16 | End: 2025-10-16

## 2024-10-16 RX ORDER — METFORMIN HYDROCHLORIDE 500 MG/1
500 TABLET, EXTENDED RELEASE ORAL 2 TIMES DAILY WITH MEALS
Qty: 180 TABLET | Refills: 3 | Status: CANCELLED | OUTPATIENT
Start: 2024-10-16 | End: 2025-10-16

## 2024-10-16 RX ORDER — METFORMIN HYDROCHLORIDE 500 MG/1
500 TABLET, EXTENDED RELEASE ORAL 2 TIMES DAILY WITH MEALS
Qty: 180 TABLET | Refills: 3 | Status: SHIPPED | OUTPATIENT
Start: 2024-10-16 | End: 2024-10-16 | Stop reason: SDUPTHER

## 2024-10-16 RX ORDER — METFORMIN HYDROCHLORIDE 500 MG/1
500 TABLET, EXTENDED RELEASE ORAL 2 TIMES DAILY WITH MEALS
Qty: 180 TABLET | Refills: 3 | Status: SHIPPED | OUTPATIENT
Start: 2024-10-16 | End: 2025-10-16

## 2024-10-16 RX ORDER — LEVOTHYROXINE SODIUM 175 UG/1
350 TABLET ORAL
Qty: 180 TABLET | Refills: 1 | Status: SHIPPED | OUTPATIENT
Start: 2024-10-16 | End: 2025-04-14

## 2024-10-16 NOTE — PROGRESS NOTES
Subjective:       Patient ID: Erendira Pretty is a 79 y.o. male.    Chief Complaint: Follow-up (Medication refill/metformin)    Follow-up  Pertinent negatives include no abdominal pain, arthralgias, chest pain, chills, congestion, coughing, diaphoresis, fatigue, fever, headaches, joint swelling, nausea, neck pain, numbness, sore throat, vomiting or weakness.         1) DM: no hyper/hypoglycemic symptoms. Good checks at home  2) HTN: rare BP checks, B/P good, no HTNive symptoms. On beta blocker for esophogeal varicies  3) LIPIDS: following D&E, statin intolerant/myopathy. Repatha changed to leqvio  4) CAD/Atherosclerosis: no CP, active and sees Dr. Gallardo/upcoming watchman procedure  5) Hypothyroid: asymptomatic, no swallowing difficulties, no hyper/hypothyroid symptoms. Compliant with levothyroxine.  6) Hepatocellular carcinoma/cirrhosis: followed by Dr. Tomas  7) Asthma/DELONTE/asbestos/obesity: followed by Pulmonary, off ICS due to ENT findings  8) GERD: quiet on PPI  9) BPH: symptoms ok on proscar  10) GI bleed/Anemia: now no further bleeding, counts improving                      Review of Systems   Constitutional:  Negative for activity change, appetite change, chills, diaphoresis, fatigue, fever and unexpected weight change.   HENT:  Negative for congestion, ear pain, postnasal drip, rhinorrhea, sinus pressure, sinus pain, sneezing, sore throat, tinnitus, trouble swallowing and voice change.    Eyes:  Negative for photophobia, pain and visual disturbance.   Respiratory:  Negative for cough, chest tightness, shortness of breath and wheezing.    Cardiovascular:  Negative for chest pain, palpitations and leg swelling.   Gastrointestinal:  Negative for abdominal distention, abdominal pain, constipation, diarrhea, nausea and vomiting.   Genitourinary:  Negative for decreased urine volume, difficulty urinating, dysuria, flank pain, frequency, hematuria and urgency.   Musculoskeletal:  Negative for arthralgias, back  pain, joint swelling, neck pain and neck stiffness.   Allergic/Immunologic: Negative for immunocompromised state.   Neurological:  Negative for dizziness, tremors, seizures, syncope, facial asymmetry, speech difficulty, weakness, light-headedness, numbness and headaches.   Hematological:  Negative for adenopathy. Does not bruise/bleed easily.   Psychiatric/Behavioral:  Negative for confusion and sleep disturbance.        Objective:      Physical Exam  Constitutional:       Appearance: He is obese.   HENT:      Head: Normocephalic and atraumatic.      Right Ear: Tympanic membrane normal.      Left Ear: Tympanic membrane normal.   Eyes:      Conjunctiva/sclera: Conjunctivae normal.   Cardiovascular:      Rate and Rhythm: Normal rate and regular rhythm.      Heart sounds: Murmur heard.   Pulmonary:      Effort: Pulmonary effort is normal.      Breath sounds: Normal breath sounds.   Abdominal:      General: Bowel sounds are normal.      Palpations: Abdomen is soft.   Musculoskeletal:         General: Normal range of motion.      Cervical back: Normal range of motion and neck supple.   Skin:     General: Skin is warm and dry.   Neurological:      Mental Status: He is alert and oriented to person, place, and time.         Assessment:     Vitals:    10/16/24 0923   BP: 118/76   Pulse: 70   Temp: 96.7 °F (35.9 °C)         1. Essential hypertension    2. Mixed hyperlipidemia    3. Paroxysmal atrial fibrillation    4. S/P CABG x 2    5. Coronary artery disease involving native coronary artery of native heart without angina pectoris    6. Aortic atherosclerosis    7. Mild intermittent asthma without complication    8. Non-seasonal allergic rhinitis due to pollen    9. Benign prostatic hyperplasia with nocturia    10. Erectile dysfunction, unspecified erectile dysfunction type    11. Iron deficiency anemia due to chronic blood loss    12. Acquired hypothyroidism    13. Type 2 diabetes mellitus with microalbuminuria, with  long-term current use of insulin    14. Gastroesophageal reflux disease with esophagitis without hemorrhage    15. DELONTE on CPAP        Plan:   Essential hypertension    Mixed hyperlipidemia    Paroxysmal atrial fibrillation    S/P CABG x 2    Coronary artery disease involving native coronary artery of native heart without angina pectoris    Aortic atherosclerosis    Mild intermittent asthma without complication    Non-seasonal allergic rhinitis due to pollen    Benign prostatic hyperplasia with nocturia    Erectile dysfunction, unspecified erectile dysfunction type    Iron deficiency anemia due to chronic blood loss    Acquired hypothyroidism  -     TSH; Future; Expected date: 04/14/2025    Type 2 diabetes mellitus with microalbuminuria, with long-term current use of insulin  -     Hemoglobin A1C; Future; Expected date: 04/14/2025  -     Comprehensive Metabolic Panel; Future; Expected date: 04/14/2025  -     Lipid Panel; Future; Expected date: 04/14/2025  -     Microalbumin/Creatinine Ratio, Urine; Future; Expected date: 04/16/2025    Gastroesophageal reflux disease with esophagitis without hemorrhage    DELONTE on CPAP    Other orders  -     metFORMIN (GLUCOPHAGE-XR) 500 MG ER 24hr tablet; Take 1 tablet (500 mg total) by mouth 2 (two) times daily with meals.  Dispense: 180 tablet; Refill: 3  -     furosemide (LASIX) 40 MG tablet; Take 1 tablet (40 mg total) by mouth 2 (two) times daily.  Dispense: 60 tablet; Refill: 11  -     levothyroxine (SYNTHROID, LEVOTHROID) 175 MCG tablet; Take 2 tablets (350 mcg total) by mouth before breakfast.  Dispense: 180 tablet; Refill: 1      Overall he is stable  C/w current mgt and specialty care  He prefers two 175 mcg tablets for synthroid instead of one 300 mcg and one 50 mcg. This was adjusted for him  Pt plans to call to schedule eye exam  Rtc 6mo w lab  Chronic complex mgt complete

## 2024-10-16 NOTE — TELEPHONE ENCOUNTER
----- Message from Camgustavocatrachito sent at 10/16/2024  2:54 PM CDT -----  Contact: Erendira  Type:  Patient Requesting a call back     Who Called:Erendira  What is the call back request regarding?:Requesting a call back to get an appointment with Dr. Tran for an annual diabetic exam  Would the patient rather a call back or a response via [facility]?call  Best Call Back Number:[phone]   Additional Information:

## 2024-10-17 ENCOUNTER — OFFICE VISIT (OUTPATIENT)
Dept: UROLOGY | Facility: CLINIC | Age: 79
End: 2024-10-17
Payer: MEDICARE

## 2024-10-17 ENCOUNTER — HOSPITAL ENCOUNTER (OUTPATIENT)
Dept: PREADMISSION TESTING | Facility: HOSPITAL | Age: 79
Discharge: HOME | End: 2024-10-17
Attending: INTERNAL MEDICINE
Payer: MEDICARE

## 2024-10-17 ENCOUNTER — TELEPHONE (OUTPATIENT)
Dept: INTERNAL MEDICINE | Facility: CLINIC | Age: 79
End: 2024-10-17
Payer: MEDICARE

## 2024-10-17 VITALS
HEIGHT: 67 IN | BODY MASS INDEX: 35.68 KG/M2 | SYSTOLIC BLOOD PRESSURE: 128 MMHG | DIASTOLIC BLOOD PRESSURE: 77 MMHG | HEART RATE: 76 BPM | TEMPERATURE: 98 F | WEIGHT: 227.31 LBS | RESPIRATION RATE: 16 BRPM

## 2024-10-17 DIAGNOSIS — R35.1 NOCTURIA: ICD-10-CM

## 2024-10-17 DIAGNOSIS — R39.11 BENIGN PROSTATIC HYPERPLASIA WITH URINARY HESITANCY: Primary | ICD-10-CM

## 2024-10-17 DIAGNOSIS — N40.1 BENIGN PROSTATIC HYPERPLASIA WITH URINARY HESITANCY: Primary | ICD-10-CM

## 2024-10-17 LAB
BILIRUB UR QL STRIP: NEGATIVE
GLUCOSE UR QL STRIP: POSITIVE
KETONES UR QL STRIP: NEGATIVE
LEUKOCYTE ESTERASE UR QL STRIP: NEGATIVE
PH, POC UA: 5
POC BLOOD, URINE: NEGATIVE
POC NITRATES, URINE: NEGATIVE
POC RESIDUAL URINE VOLUME: 10 ML (ref 0–100)
PROT UR QL STRIP: NEGATIVE
SP GR UR STRIP: 1.01 (ref 1–1.03)
UROBILINOGEN UR STRIP-ACNC: 0.2 (ref 0.3–2.2)

## 2024-10-17 PROCEDURE — 51798 US URINE CAPACITY MEASURE: CPT | Mod: S$GLB,,, | Performed by: UROLOGY

## 2024-10-17 PROCEDURE — 1126F AMNT PAIN NOTED NONE PRSNT: CPT | Mod: CPTII,S$GLB,, | Performed by: UROLOGY

## 2024-10-17 PROCEDURE — 3288F FALL RISK ASSESSMENT DOCD: CPT | Mod: CPTII,S$GLB,, | Performed by: UROLOGY

## 2024-10-17 PROCEDURE — 81003 URINALYSIS AUTO W/O SCOPE: CPT | Mod: QW,S$GLB,, | Performed by: UROLOGY

## 2024-10-17 PROCEDURE — 99999 PR PBB SHADOW E&M-EST. PATIENT-LVL III: CPT | Mod: PBBFAC,,, | Performed by: UROLOGY

## 2024-10-17 PROCEDURE — 1159F MED LIST DOCD IN RCRD: CPT | Mod: CPTII,S$GLB,, | Performed by: UROLOGY

## 2024-10-17 PROCEDURE — 1157F ADVNC CARE PLAN IN RCRD: CPT | Mod: CPTII,S$GLB,, | Performed by: UROLOGY

## 2024-10-17 PROCEDURE — 3078F DIAST BP <80 MM HG: CPT | Mod: CPTII,S$GLB,, | Performed by: UROLOGY

## 2024-10-17 PROCEDURE — 3074F SYST BP LT 130 MM HG: CPT | Mod: CPTII,S$GLB,, | Performed by: UROLOGY

## 2024-10-17 PROCEDURE — 1101F PT FALLS ASSESS-DOCD LE1/YR: CPT | Mod: CPTII,S$GLB,, | Performed by: UROLOGY

## 2024-10-17 PROCEDURE — 3072F LOW RISK FOR RETINOPATHY: CPT | Mod: CPTII,S$GLB,, | Performed by: UROLOGY

## 2024-10-17 PROCEDURE — 99214 OFFICE O/P EST MOD 30 MIN: CPT | Mod: S$GLB,,, | Performed by: UROLOGY

## 2024-10-17 RX ORDER — MIRABEGRON 50 MG/1
50 TABLET, EXTENDED RELEASE ORAL DAILY
Qty: 30 TABLET | Refills: 5 | Status: SHIPPED | OUTPATIENT
Start: 2024-10-17 | End: 2025-04-15

## 2024-10-17 NOTE — PROGRESS NOTES
Chief Complaint:   Encounter Diagnoses   Name Primary?    Benign prostatic hyperplasia with urinary hesitancy Yes    Nocturia        HPI:  HPI Erendira Pretty is a 79 y.o. male who presents with follow up to polyuria nocturia.  In the interim he has decreased his Lasix to once in the morning.  He is maintained on Jardiance and does have significant glucosuria.  He also complains of polyuria and being thirsty all the time.  He drinks lots of water.  He was started on mirabegron 25 mg in his not noticed much change.  He continues to get up about every 2 hours at night which is his main complaint.    History:  Social History     Tobacco Use    Smoking status: Former     Current packs/day: 0.00     Average packs/day: 0.5 packs/day for 4.0 years (2.0 ttl pk-yrs)     Types: Cigarettes     Start date: 1968     Quit date: 1972     Years since quittin.3    Smokeless tobacco: Former     Types: Chew     Quit date: 1976   Substance Use Topics    Alcohol use: Not Currently    Drug use: No     Past Medical History:   Diagnosis Date    Anticoagulant long-term use     Aortic stenosis     Asthma     Benign prostatic hyperplasia with nocturia     Bilateral carotid artery stenosis 2021    US CAROTID BILATERAL 2021 IMPRESSION: Atherosclerosis with suspected stenosis greater than 50% at the right proximal ICA visually. Velocities are discordant and appear improved from prior. Atherosclerosis on the left with no evidence of flow-limiting stenosis greater than 50%.  FINDINGS: Right: Internal Carotid Artery (ICA): Peak systolic velocity 118 cm/sec. End diastolic velocity 35 cm/sec    BPH (benign prostatic hyperplasia)     CAD (coronary artery disease)     40% lesion ;lazo    Cirrhosis     Claudication 2014    Colon polyp     COPD (chronic obstructive pulmonary disease)     ED (erectile dysfunction)     Encounter for blood transfusion     Ex-smoker     Hearing loss NEC     Hepatitis C      Cured;reji brown    Hyperlipidemia     Hypertension     Hypothyroid     s/p tx graves    Open angle with borderline findings and low glaucoma risk in both eyes 09/22/2020    DELONTE on CPAP     Osteoarthritis     Paroxysmal atrial fibrillation     PVD (peripheral vascular disease)     Secondary esophageal varices without bleeding 06/26/2017    Type 2 diabetes mellitus      Past Surgical History:   Procedure Laterality Date    ABLATION OF ARRHYTHMOGENIC FOCUS FOR ATRIAL FIBRILLATION N/A 6/24/2024    Procedure: Ablation atrial fibrillation;  Surgeon: Horacio Huerta MD;  Location: [facility] EP LAB;  Service: Cardiology;  Laterality: N/A;  afib, PVI, RFA, BERNARD (cx if SR), ZIA, anes, MB, 3 Prep, 3 hours per Dr. Huerta    ANGIOGRAM, CORONARY, WITH LEFT HEART CATHETERIZATION N/A 8/27/2024    Procedure: Angiogram, Coronary, with Left Heart Cath;  Surgeon: Stanton Jefferson MD;  Location: [facility] CATH LAB;  Service: Cardiology;  Laterality: N/A;    ANGIOGRAM, EXTREMITY, UNILATERAL Left 1/4/2024    Procedure: ANGIOGRAM, EXTREMITY, UNILATERAL- Femoral / SMS Stent, Poss General;  Surgeon: ALEX Alfred III, MD;  Location: [facility] OR [address] [place] [address];  Service: Vascular;  Laterality: Left;  mGy : 2698.78  Gy.cm : 229.88  Contrast : 62ml  Fluro time : 13.8min    ARTERIOGRAPHY OF SUBCLAVIAN ARTERY  8/27/2024    Procedure: ARTERIOGRAM, SUBCLAVIAN;  Surgeon: Stanton Jefferson MD;  Location: [facility] CATH LAB;  Service: Cardiology;;    CARDIAC CATHETERIZATION      CARDIAC SURGERY      CARPAL TUNNEL RELEASE Left     CATARACT EXTRACTION      CATARACT EXTRACTION W/ INTRAOCULAR LENS  IMPLANT, BILATERAL  2008    CATHETERIZATION OF BOTH LEFT AND RIGHT HEART N/A 11/2/2018    Procedure: CATHETERIZATION, HEART, BOTH LEFT AND RIGHT;  Surgeon: Frank Gallardo MD;  Location: [facility] CATH LAB;  Service: Cardiology;  Laterality: N/A;    COLONOSCOPY  8/2013    COLONOSCOPY N/A 5/30/2016    Procedure: COLONOSCOPY;  Surgeon: Anna Tomas MD;  Location:  [facility] ENDO;  Service: Endoscopy;  Laterality: N/A;    COLONOSCOPY N/A 7/17/2019    Procedure: COLONOSCOPY;  Surgeon: David Carter III, MD;  Location: [facility] ENDO;  Service: Endoscopy;  Laterality: N/A;    COLONOSCOPY N/A 12/14/2023    Procedure: COLONOSCOPY;  Surgeon: Ama Barrera MD;  Location: [facility] ENDO;  Service: Endoscopy;  Laterality: N/A;    COLONOSCOPY N/A 7/27/2024    Procedure: COLONOSCOPY;  Surgeon: Ama Barrera MD;  Location: [facility] ENDO;  Service: Endoscopy;  Laterality: N/A;    COMPUTED TOMOGRAPHY N/A 9/9/2019    Procedure: CT (COMPUTED TOMOGRAPHY);  Surgeon: [facility] Diagnostic Provider;  Location: [facility];  Service: General;  Laterality: N/A;  Microwave ablation to be done in CT.  Need CRNA and cart    COMPUTED TOMOGRAPHY N/A 1/25/2022    Procedure: Liver Microwave Ablation;  Surgeon: Red Puentes MD;  Location: [facility];  Service: General;  Laterality: N/A;    CORONARY ARTERY BYPASS GRAFT (CABG) N/A 11/5/2018    Procedure: CORONARY ARTERY BYPASS GRAFT (CABG);  Surgeon: Bera De Luna MD;  Location: [facility];  Service: Cardiothoracic;  Laterality: N/A;  TWO VESSEL BYPASS WITH AORTOTOMY AND EXCISION OF ATHEROSCLEROTIC PLAQUE    CORONARY BYPASS GRAFT ANGIOGRAPHY  3/2/2020    Procedure: Bypass graft study;  Surgeon: Cora Cormier MD;  Location: [facility] CATH LAB;  Service: Cardiology;;    CORONARY BYPASS GRAFT ANGIOGRAPHY  8/27/2024    Procedure: Bypass graft study;  Surgeon: Stanton Jefferson MD;  Location: [facility] CATH LAB;  Service: Cardiology;;    ECHOCARDIOGRAM,TRANSESOPHAGEAL N/A 6/24/2024    Procedure: Transesophageal echo (BERNARD) intra-procedure log documentation;  Surgeon: Nacho Downs MD;  Location: [facility] EP LAB;  Service: Cardiology;  Laterality: N/A;    ELBOW BURSA SURGERY Right 2010    ENDOSCOPIC HARVEST OF VEIN Left 11/5/2018    Procedure: SURGICAL PROCUREMENT, VEIN, ENDOSCOPIC;  Surgeon: Bear De Luna MD;  Location: [facility];  Service: Cardiothoracic;  Laterality: Left;     ESOPHAGOGASTRODUODENOSCOPY N/A 7/17/2019    Procedure: ESOPHAGOGASTRODUODENOSCOPY (EGD);  Surgeon: David Carter III, MD;  Location: [facility];  Service: Endoscopy;  Laterality: N/A;    ESOPHAGOGASTRODUODENOSCOPY N/A 12/29/2022    Procedure: ESOPHAGOGASTRODUODENOSCOPY (EGD);  Surgeon: Issa Gayle MD;  Location: [facility];  Service: Endoscopy;  Laterality: N/A;    ESOPHAGOGASTRODUODENOSCOPY N/A 1/17/2024    Procedure: EGD (ESOPHAGOGASTRODUODENOSCOPY);  Surgeon: Issa Gayle MD;  Location: [facility];  Service: Endoscopy;  Laterality: N/A;    ESOPHAGOGASTRODUODENOSCOPY N/A 4/30/2024    Procedure: EGD (ESOPHAGOGASTRODUODENOSCOPY);  Surgeon: Eder Foley MD;  Location: [facility];  Service: Gastroenterology;  Laterality: N/A;    ESOPHAGOGASTRODUODENOSCOPY N/A 7/27/2024    Procedure: EGD (ESOPHAGOGASTRODUODENOSCOPY);  Surgeon: Ama Barrera MD;  Location: [facility];  Service: Endoscopy;  Laterality: N/A;    ETHMOIDECTOMY Bilateral 3/27/2019    Procedure: ETHMOIDECTOMY;  Surgeon: Alejandro Parkinson MD;  Location: [facility];  Service: ENT;  Laterality: Bilateral;    EYE SURGERY      FOOT SURGERY      FRONTAL SINUS OBLITERATION Bilateral 3/27/2019    Procedure: SINUSOTOMY, FRONTAL SINUS, OBLITERATIVE;  Surgeon: Alejandro Parkinson MD;  Location: [facility] OR;  Service: ENT;  Laterality: Bilateral;    FUNCTIONAL ENDOSCOPIC SINUS SURGERY (FESS) Bilateral 3/27/2019    Procedure: FESS (FUNCTIONAL ENDOSCOPIC SINUS SURGERY);  Surgeon: Alejandro Parkinson MD;  Location: [facility] OR;  Service: ENT;  Laterality: Bilateral;  Left Keila bullosa resection     INTRALUMINAL GASTROINTESTINAL TRACT IMAGING VIA CAPSULE N/A 2/2/2024    Procedure: IMAGING PROCEDURE, GI TRACT, INTRALUMINAL, VIA CAPSULE;  Surgeon: Cooper Estrella RN;  Location: [facility] ENDO;  Service: Endoscopy;  Laterality: N/A;    INTRALUMINAL GASTROINTESTINAL TRACT IMAGING VIA CAPSULE N/A 7/31/2024    Procedure: IMAGING PROCEDURE, GI TRACT, INTRALUMINAL, VIA CAPSULE;  Surgeon:  Cooper Estrella RN;  Location: [facility] ENDO;  Service: Endoscopy;  Laterality: N/A;    KNEE ARTHROSCOPY W/ MENISCAL REPAIR Left 06/2019    dr boykin    KNEE SURGERY Right     LEFT HEART CATHETERIZATION Left 3/2/2020    Procedure: CATHETERIZATION, HEART, LEFT;  Surgeon: Croa Cormier MD;  Location: [facility] CATH LAB;  Service: Cardiology;  Laterality: Left;    LIVER BIOPSY  01/2022    MAXILLARY ANTROSTOMY Bilateral 3/27/2019    Procedure: MAXILLARY ANTROSTOMY;  Surgeon: Alejandro Parkinson MD;  Location: [facility] OR;  Service: ENT;  Laterality: Bilateral;    NASAL SEPTOPLASTY N/A 3/27/2019    Procedure: SEPTOPLASTY, NOSE;  Surgeon: Alejandro Parkinson MD;  Location: [facility] OR;  Service: ENT;  Laterality: N/A;    RECTAL SURGERY  2012    RIGHT HEART CATHETERIZATION Right 8/27/2024    Procedure: INSERTION, CATHETER, RIGHT HEART;  Surgeon: Stanton Jefferson MD;  Location: [facility] CATH LAB;  Service: Cardiology;  Laterality: Right;    STENT, SUPERIOR MESENTERIC ARTERY Left 1/4/2024    Procedure: STENT, SUPERIOR MESENTERIC ARTERY;  Surgeon: ALEX Alfred III, MD;  Location: [address];  Service: Vascular;  Laterality: Left;    TRANSURETHRAL RESECTION OF PROSTATE (TURP) WITHOUT USE OF LASER N/A 6/19/2018    Procedure: TURP, WITHOUT USING LASER;  Surgeon: Cooper Cordova IV, MD;  Location: [facility] OR;  Service: Urology;  Laterality: N/A;    TRIGGER FINGER RELEASE Left     VALVE STUDY-AORTIC  8/27/2024    Procedure: Valve study-aortic;  Surgeon: Stanton Jefferson MD;  Location: [facility] CATH LAB;  Service: Cardiology;;     Family History   Problem Relation Name Age of Onset    Heart disease Mother      Heart disease Father      Heart disease Brother      Colon cancer Neg Hx         Current Outpatient Medications on File Prior to Visit   Medication Sig Dispense Refill    ACCU-CHEK GRACE PLUS METER Misc AS DIRECTED 1 each 0    albuterol (PROVENTIL HFA) 90 mcg/actuation inhaler Inhale 2 puffs into the lungs every 6 (six) hours as needed for Wheezing. Rescue       amLODIPine (NORVASC) 5 MG tablet Take 1 tablet (5 mg total) by mouth once daily. 30 tablet 0    aspirin (ECOTRIN) 81 MG EC tablet Take 1 tablet (81 mg total) by mouth once daily. 90 tablet 3    blood sugar diagnostic (ACCU-CHEK GRACE PLUS TEST STRP) Strp TEST THREE TIMES A  strip 11    budesonide-formoterol 160-4.5 mcg (SYMBICORT) 160-4.5 mcg/actuation HFAA INHALE 2 PUFFS INTO THE LUNGS TWO TIMES A DAY. 10.2 g 11    carvediloL (COREG) 3.125 MG tablet Take 1 tablet (3.125 mg total) by mouth 2 (two) times daily with meals. 60 tablet 6    cimetidine (TAGAMET) 300 MG tablet Take one tablet by mouth on 11/7/2024 at 4:15 pm, one tablet on 11/7/2024 at 10:15 pm, and then one tablet on 11/8/2024 at 5:15 am for pre-procedure 3 tablet 0    diphenhydrAMINE (BENADRYL) 50 MG capsule Take one tablet by mouth on 11/7/2024 at 4:15 pm, one tablet on 11/7/2024 at 10:15 pm, and then one tablet on 11/8/2024 at 5:15 am for pre-procedure 3 capsule 0    empagliflozin (JARDIANCE) 25 mg tablet Take 1 tablet (25 mg total) by mouth once daily. 90 tablet 3    famotidine (PEPCID) 40 MG tablet Take 40 mg by mouth once daily.      finasteride (PROSCAR) 5 mg tablet TAKE 1 TABLET BY MOUTH once daily 90 tablet 3    furosemide (LASIX) 40 MG tablet Take 1 tablet (40 mg total) by mouth 2 (two) times daily. 60 tablet 11    GLUCOSAMINE HCL/CHONDR ROSARIO A NA (OSTEO BI-FLEX ORAL) Take 1 tablet by mouth 2 (two) times daily.       inclisiran (LEQVIO) 284 mg/1.5 mL Syrg subcutaneous injection Inject 1.5 mLs (284 mg total) into the skin every 3 (three) months. 1.5 mL 1    inclisiran (LEQVIO) 284 mg/1.5 mL Syrg subcutaneous injection Inject 1.5 mLs (284 mg total) into the skin every 6 (six) months. 1.5 mL 1    krill oil 500 mg Cap Take 1 capsule by mouth Daily.      lancets (ACCU-CHEK FASTCLIX LANCET DRUM) Misc TEST TWO TIMES A  each 5    LANTUS SOLOSTAR U-100 INSULIN 100 unit/mL (3 mL) InPn pen INJECT 44 UNITS UNDER THE SKIN EVERY EVENING 45 mL  "0    levothyroxine (SYNTHROID, LEVOTHROID) 175 MCG tablet Take 2 tablets (350 mcg total) by mouth before breakfast. 180 tablet 1    lisinopriL 10 MG tablet Take 1 tablet by mouth once daily 90 tablet 3    metFORMIN (GLUCOPHAGE-XR) 500 MG ER 24hr tablet Take 1 tablet (500 mg total) by mouth 2 (two) times daily with meals. 180 tablet 3    montelukast (SINGULAIR) 10 mg tablet Take 1 tablet (10 mg total) by mouth once daily. 90 tablet 3    pen needle, diabetic (BD ULTRA-FINE MINI PEN NEEDLE) 31 gauge x 3/16" Ndle USE AS DIRECTED 100 each 11    RABEprazole (ACIPHEX) 20 mg tablet Take 1 tablet (20 mg total) by mouth 2 (two) times daily. 180 tablet 3    ranolazine (RANEXA) 1,000 mg Tb12 Take 1 tablet (1,000 mg total) by mouth 2 (two) times daily. 60 tablet 2    REPATHA SURECLICK 140 mg/mL PnIj INJECT 140mg subcutaneously every 14 days 2 mL 11    rivaroxaban (XARELTO) 20 mg Tab Take 20 mg by mouth daily with dinner or evening meal.      [DISCONTINUED] mirabegron (MYRBETRIQ) 25 mg Tb24 ER tablet Take 1 tablet (25 mg total) by mouth once daily. 30 tablet 11    isosorbide mononitrate (IMDUR) 30 MG 24 hr tablet Take 1 tablet (30 mg total) by mouth once daily. 30 tablet 0     Current Facility-Administered Medications on File Prior to Visit   Medication Dose Route Frequency Provider Last Rate Last Admin    lactated ringers infusion   Intravenous Continuous Omaira Theodore MD   New Bag at 09/09/19 1117        Objective:     Vitals:    10/17/24 1024   BP: 128/77   BP Location: Left arm   Patient Position: Sitting   Pulse: 76   Resp: 16   Temp: 97.5 °F (36.4 °C)   TempSrc: Oral   Weight: 103.1 kg (227 lb 4.7 oz)   Height: 5' 7" (1.702 m)      BMI Readings from Last 1 Encounters:   10/17/24 35.60 kg/m²          Physical Exam  No acute distress alert and oriented   Respirations even unlabored   Abdomen is obese  Lab Results   Component Value Date    PSADIAG 0.22 04/11/2024    PSA 0.17 10/09/2023    PSA 0.14 06/28/2022    PSA " 0.14 07/14/2021    PSA 0.27 07/20/2020    PSA 0.08 07/08/2019    PSA 0.34 06/04/2018    PSA 0.35 05/07/2018    PSA 0.40 03/23/2016    PSA 0.26 04/15/2015    PSA 0.32 03/03/2014    PSA 0.40 03/04/2013    PSA 0.31 04/12/2012    PSA 0.31 04/04/2011    PSA 0.30 01/06/2011    PSA 0.34 05/26/2010    PSA 0.21 05/04/2009    PSA 0.3 05/02/2008    PSA 0.2 08/01/2005    PSA 0.2 08/16/2004        Lab Results   Component Value Date    CREATININE 1.1 10/09/2024      Assessment:       1. Benign prostatic hyperplasia with urinary hesitancy    2. Nocturia        Plan:     1. Benign prostatic hyperplasia with urinary hesitancy    2. Nocturia       Orders Placed This Encounter    POCT Urinalysis, Dipstick, Automated, W/O Scope    POCT Bladder Scan    mirabegron (MYRBETRIQ) 50 mg Tb24      BPH with nocturia.  Discussed with the patient that his nocturia is likely multifactorial and unrelated to BPH.  He is maintained on finasteride.  He has a small prostate on exam.  I recommend increasing his mirabegron to 50 mg.  He understands if this is not successful then options are limited.  A trial off of Jardiance may be helpful to see if we can decrease his nocturia.  Although he was concerned about blood sugars going backup as these are difficult to control in the past.  We will re-evaluate him in 1 year earlier earlier as needed.

## 2024-10-22 RX ORDER — AMLODIPINE BESYLATE 5 MG/1
5 TABLET ORAL DAILY
Qty: 90 TABLET | Refills: 3 | Status: SHIPPED | OUTPATIENT
Start: 2024-10-22 | End: 2025-10-17

## 2024-10-28 PROBLEM — K55.21 AVM (ARTERIOVENOUS MALFORMATION) OF SMALL BOWEL, ACQUIRED WITH HEMORRHAGE: Status: RESOLVED | Noted: 2024-07-27 | Resolved: 2024-10-28

## 2024-10-28 PROBLEM — N17.9 AKI (ACUTE KIDNEY INJURY): Status: RESOLVED | Noted: 2024-07-25 | Resolved: 2024-10-28

## 2024-10-31 ENCOUNTER — PATIENT MESSAGE (OUTPATIENT)
Dept: CARDIOLOGY | Facility: CLINIC | Age: 79
End: 2024-10-31
Payer: MEDICARE

## 2024-10-31 DIAGNOSIS — I48.0 PAROXYSMAL ATRIAL FIBRILLATION: Primary | ICD-10-CM

## 2024-10-31 DIAGNOSIS — K21.9 GASTROESOPHAGEAL REFLUX DISEASE WITHOUT ESOPHAGITIS: ICD-10-CM

## 2024-10-31 RX ORDER — RABEPRAZOLE SODIUM 20 MG/1
20 TABLET, DELAYED RELEASE ORAL 2 TIMES DAILY
Qty: 180 TABLET | Refills: 3 | Status: SHIPPED | OUTPATIENT
Start: 2024-10-31

## 2024-11-01 ENCOUNTER — LAB VISIT (OUTPATIENT)
Dept: LAB | Facility: HOSPITAL | Age: 79
End: 2024-11-01
Attending: INTERNAL MEDICINE
Payer: MEDICARE

## 2024-11-01 DIAGNOSIS — Z01.818 PRE-OP TESTING: ICD-10-CM

## 2024-11-01 DIAGNOSIS — I49.9 CARDIAC ARRHYTHMIA, UNSPECIFIED CARDIAC ARRHYTHMIA TYPE: ICD-10-CM

## 2024-11-01 DIAGNOSIS — I48.92 ATRIAL FLUTTER, UNSPECIFIED TYPE: ICD-10-CM

## 2024-11-01 LAB
ANION GAP SERPL CALC-SCNC: 7 MMOL/L (ref 8–16)
APTT PPP: 29.1 SEC (ref 21–32)
BUN SERPL-MCNC: 16 MG/DL (ref 8–23)
CALCIUM SERPL-MCNC: 8.9 MG/DL (ref 8.7–10.5)
CHLORIDE SERPL-SCNC: 107 MMOL/L (ref 95–110)
CO2 SERPL-SCNC: 25 MMOL/L (ref 23–29)
CREAT SERPL-MCNC: 1 MG/DL (ref 0.5–1.4)
ERYTHROCYTE [DISTWIDTH] IN BLOOD BY AUTOMATED COUNT: 19.9 % (ref 11.5–14.5)
EST. GFR  (NO RACE VARIABLE): >60 ML/MIN/1.73 M^2
GLUCOSE SERPL-MCNC: 136 MG/DL (ref 70–110)
HCT VFR BLD AUTO: 41 % (ref 40–54)
HGB BLD-MCNC: 13.4 G/DL (ref 14–18)
INR PPP: 1 (ref 0.8–1.2)
MCH RBC QN AUTO: 30.7 PG (ref 27–31)
MCHC RBC AUTO-ENTMCNC: 32.7 G/DL (ref 32–36)
MCV RBC AUTO: 94 FL (ref 82–98)
PLATELET # BLD AUTO: 121 K/UL (ref 150–450)
PMV BLD AUTO: 10.6 FL (ref 9.2–12.9)
POTASSIUM SERPL-SCNC: 4.3 MMOL/L (ref 3.5–5.1)
PROTHROMBIN TIME: 10.9 SEC (ref 9–12.5)
RBC # BLD AUTO: 4.36 M/UL (ref 4.6–6.2)
SODIUM SERPL-SCNC: 139 MMOL/L (ref 136–145)
WBC # BLD AUTO: 3.01 K/UL (ref 3.9–12.7)

## 2024-11-01 PROCEDURE — 80048 BASIC METABOLIC PNL TOTAL CA: CPT | Performed by: INTERNAL MEDICINE

## 2024-11-01 PROCEDURE — 36415 COLL VENOUS BLD VENIPUNCTURE: CPT | Mod: PO | Performed by: INTERNAL MEDICINE

## 2024-11-01 PROCEDURE — 85027 COMPLETE CBC AUTOMATED: CPT | Performed by: INTERNAL MEDICINE

## 2024-11-01 PROCEDURE — 85730 THROMBOPLASTIN TIME PARTIAL: CPT | Performed by: INTERNAL MEDICINE

## 2024-11-01 PROCEDURE — 85610 PROTHROMBIN TIME: CPT | Performed by: INTERNAL MEDICINE

## 2024-11-05 ENCOUNTER — TELEPHONE (OUTPATIENT)
Dept: ELECTROPHYSIOLOGY | Facility: CLINIC | Age: 79
End: 2024-11-05
Payer: MEDICARE

## 2024-11-05 NOTE — TELEPHONE ENCOUNTER
Spoke to patient to ensure patient did  iodine/contrast prep prior to procedure. Patient did confirm he received the prescriptions sent into the pharmacy. Patient appreciated the call.

## 2024-11-06 ENCOUNTER — HOSPITAL ENCOUNTER (OUTPATIENT)
Dept: CARDIOLOGY | Facility: HOSPITAL | Age: 79
Discharge: HOME | End: 2024-11-06
Attending: INTERNAL MEDICINE
Payer: MEDICARE

## 2024-11-06 ENCOUNTER — OFFICE VISIT (OUTPATIENT)
Dept: CARDIOLOGY | Facility: CLINIC | Age: 79
End: 2024-11-06
Payer: MEDICARE

## 2024-11-06 VITALS
WEIGHT: 226.63 LBS | DIASTOLIC BLOOD PRESSURE: 78 MMHG | OXYGEN SATURATION: 98 % | BODY MASS INDEX: 35.57 KG/M2 | RESPIRATION RATE: 16 BRPM | SYSTOLIC BLOOD PRESSURE: 125 MMHG | HEART RATE: 73 BPM | HEIGHT: 67 IN

## 2024-11-06 DIAGNOSIS — I48.0 PAROXYSMAL ATRIAL FIBRILLATION: Primary | ICD-10-CM

## 2024-11-06 DIAGNOSIS — I48.0 PAROXYSMAL ATRIAL FIBRILLATION: ICD-10-CM

## 2024-11-06 PROCEDURE — 1101F PT FALLS ASSESS-DOCD LE1/YR: CPT | Mod: CPTII,S$GLB,, | Performed by: INTERNAL MEDICINE

## 2024-11-06 PROCEDURE — 3288F FALL RISK ASSESSMENT DOCD: CPT | Mod: CPTII,S$GLB,, | Performed by: INTERNAL MEDICINE

## 2024-11-06 PROCEDURE — 99999 PR PBB SHADOW E&M-EST. PATIENT-LVL V: CPT | Mod: PBBFAC,,, | Performed by: INTERNAL MEDICINE

## 2024-11-06 PROCEDURE — 93005 ELECTROCARDIOGRAM TRACING: CPT

## 2024-11-06 PROCEDURE — 1157F ADVNC CARE PLAN IN RCRD: CPT | Mod: CPTII,S$GLB,, | Performed by: INTERNAL MEDICINE

## 2024-11-06 PROCEDURE — 3074F SYST BP LT 130 MM HG: CPT | Mod: CPTII,S$GLB,, | Performed by: INTERNAL MEDICINE

## 2024-11-06 PROCEDURE — 93010 ELECTROCARDIOGRAM REPORT: CPT | Mod: ,,, | Performed by: INTERNAL MEDICINE

## 2024-11-06 PROCEDURE — 1159F MED LIST DOCD IN RCRD: CPT | Mod: CPTII,S$GLB,, | Performed by: INTERNAL MEDICINE

## 2024-11-06 PROCEDURE — 99213 OFFICE O/P EST LOW 20 MIN: CPT | Mod: S$GLB,,, | Performed by: INTERNAL MEDICINE

## 2024-11-06 PROCEDURE — 3072F LOW RISK FOR RETINOPATHY: CPT | Mod: CPTII,S$GLB,, | Performed by: INTERNAL MEDICINE

## 2024-11-06 PROCEDURE — 3078F DIAST BP <80 MM HG: CPT | Mod: CPTII,S$GLB,, | Performed by: INTERNAL MEDICINE

## 2024-11-06 RX ORDER — CLOPIDOGREL BISULFATE 75 MG/1
75 TABLET ORAL DAILY
Qty: 30 TABLET | Refills: 11 | Status: SHIPPED | OUTPATIENT
Start: 2024-11-06 | End: 2025-11-06

## 2024-11-06 NOTE — PROGRESS NOTES
Subjective   Patient ID:  Erendira Pretty is a 79 y.o. male who presents for follow-up of Atrial Fibrillation      79 yoM CAD/CABG here for arrhythmia management.     3/24: AF noted 8/23. He was stared on rythmol but no rhythm control via CV attempt was made. He remained in AF 2/7/24, SR with long first degree AVB later in February. He had a vascular intervention in his abdomen 1/24 with subsequent GI bleeding later that month. He has been off OAC since the GI bleeding event. Xarelto was restarted    5/24: GI bleeding 4/27/24 with no source identified. Off xarelto since discharge. Remains in SR on rythmol with first degree AVB. Due for PVI 6/24/24, LAAO 6w later    10/24: No sustained recurrence. LAAO scheduled 11/8/24. He is on aspirin alone.     Interval history: Due for LAAO in 2 days. We will plan to use plavix alone.     CHADSVASC of 4  HAS BLED of 4    Echo 8/24:  ·  Left Ventricle: The left ventricle is normal in size. Normal wall thickness. Septal motion is consistent with post-operative status. There is normal systolic function with a visually estimated ejection fraction of 60 - 65%. Grade II diastolic dysfunction.  ·  Right Ventricle: Normal right ventricular cavity size. Wall thickness is normal. Systolic function is normal.  ·  Left Atrium: Left atrium is severely dilated.  ·  Aortic Valve: There is severe aortic valve sclerosis. Severely restricted motion. There is moderate to severe stenosis. Aortic valve area by VTI is 0.84 cm². Aortic valve peak velocity is 3.67 m/s. Mean gradient is 34 mmHg. The dimensionless index is 0.27.  ·  Mitral Valve: There is mild regurgitation.  ·  Tricuspid Valve: There is mild regurgitation.  ·  Pulmonary Artery: The estimated pulmonary artery systolic pressure is 37 mmHg.  ·  IVC/SVC: Intermediate venous pressure at 8 mmHg.    LHC 8/24:  ·  Patent LIMA TO LAD and SVG OM2. Non obstructive disease of RCA unchanged from the past.  ·  The RPAV lesion was 50% stenosed.  ·  The  Mid LM to Dist LM lesion was 80% stenosed.  ·  The Dist LM to Ost LAD lesion was 90% stenosed.  ·  The Ost Cx to Prox Cx lesion was 90% stenosed.  ·  The Mid Cx lesion was 100% stenosed.  ·  The pre-procedure left ventricular end diastolic pressure was 16.  ·  The estimated blood loss was none.  ·  There was two vessel coronary artery disease.  ·  The filling pressures mildly elevated.  ·  There was severe aortic valve stenosis.  ·  RADHA by Hakki equation was 1.02 cm2, pullback gradient of 38 mmhg, CO 6.36. Follow up/evaluate with structural cardiology.    Echo 2/24:  ·  Left Ventricle: The left ventricle is normal in size. Normal wall thickness. There is mild concentric hypertrophy. There is normal systolic function with a visually estimated ejection fraction of 60 - 65%. There is normal diastolic function.  ·  Right Ventricle: Normal right ventricular cavity size. Wall thickness is normal. Right ventricle wall motion  is normal. Systolic function is normal.  ·  Left Atrium: Left atrium is severely dilated.  ·  Aortic Valve: There is moderate to severe stenosis. Aortic valve area by VTI is 0.96 cm². Aortic valve peak velocity is 3.76 m/s. Mean gradient is 31 mmHg. The dimensionless index is 0.28. There is mild aortic regurgitation.  ·  Mitral Valve: There is mild regurgitation.  ·  Tricuspid Valve: There is moderate regurgitation.  ·  Pulmonary Artery: There is moderate pulmonary hypertension. The estimated pulmonary artery systolic pressure is 58 mmHg.  ·  IVC/SVC: Intermediate venous pressure at 8 mmHg.    My interpretation of the ECG is:  SR with Mobitz I AVB    Past Medical History:  No date: Anticoagulant long-term use  No date: Aortic stenosis  No date: Asthma  No date: Benign prostatic hyperplasia with nocturia  07/21/2021: Bilateral carotid artery stenosis      Comment:  US CAROTID BILATERAL 07/14/2021 IMPRESSION:                Atherosclerosis with suspected stenosis greater than 50%                at the  right proximal ICA visually. Velocities are                discordant and appear improved from prior.                Atherosclerosis on the left with no evidence of                flow-limiting stenosis greater than 50%.  FINDINGS:                Right: Internal Carotid Artery (ICA): Peak systolic                velocity 118 cm/sec. End diastolic velocity 35 cm/sec  No date: BPH (benign prostatic hyperplasia)  No date: CAD (coronary artery disease)      Comment:  40% lesion 2002;lazo  No date: Cirrhosis  04/09/2014: Claudication  No date: Colon polyp  No date: COPD (chronic obstructive pulmonary disease)  No date: ED (erectile dysfunction)  No date: Encounter for blood transfusion  No date: Ex-smoker  No date: Hearing loss NEC  No date: Hepatitis C      Comment:  Cured;reji brown 2015  No date: Hyperlipidemia  No date: Hypertension  No date: Hypothyroid      Comment:  s/p tx graves  09/22/2020: Open angle with borderline findings and low glaucoma risk   in both eyes  No date: DELONTE on CPAP  No date: Osteoarthritis  No date: Paroxysmal atrial fibrillation  No date: PVD (peripheral vascular disease)  06/26/2017: Secondary esophageal varices without bleeding  No date: Type 2 diabetes mellitus    Past Surgical History:  6/24/2024: ABLATION OF ARRHYTHMOGENIC FOCUS FOR ATRIAL FIBRILLATION;   N/A      Comment:  Procedure: Ablation atrial fibrillation;  Surgeon:                Horacio Huerta MD;  Location: [facility] EP LAB;                 Service: Cardiology;  Laterality: N/A;  afib, PVI, RFA,                BERNARD (cx if SR), ZIA, anes, MB, 3 Prep, 3 hours per Dr. Huerta  8/27/2024: ANGIOGRAM, CORONARY, WITH LEFT HEART CATHETERIZATION; N/A      Comment:  Procedure: Angiogram, Coronary, with Left Heart Cath;                 Surgeon: Stanton Jefferson MD;  Location: [facility] CATH LAB;               Service: Cardiology;  Laterality: N/A;  1/4/2024: ANGIOGRAM, EXTREMITY, UNILATERAL; Left      Comment:   Procedure: ANGIOGRAM, EXTREMITY, UNILATERAL- Femoral /                SMS Stent, Poss General;  Surgeon: ALEX Alfred III, MD;  Location: [facility] OR [facility] FLR;  Service: Vascular;                 Laterality: Left;  mGy : 2698.78Gy.cm :                229.88Contrast : 62mlFluro time : 13.8min  8/27/2024: ARTERIOGRAPHY OF SUBCLAVIAN ARTERY      Comment:  Procedure: ARTERIOGRAM, SUBCLAVIAN;  Surgeon: Stanton Jefferson MD;  Location: [facility] CATH LAB;  Service:                Cardiology;;  No date: CARDIAC CATHETERIZATION  No date: CARDIAC SURGERY  No date: CARPAL TUNNEL RELEASE; Left  No date: CATARACT EXTRACTION  2008: CATARACT EXTRACTION W/ INTRAOCULAR LENS  IMPLANT, BILATERAL  11/2/2018: CATHETERIZATION OF BOTH LEFT AND RIGHT HEART; N/A      Comment:  Procedure: CATHETERIZATION, HEART, BOTH LEFT AND RIGHT;                Surgeon: Frank Gallardo MD;  Location: [facility] CATH                LAB;  Service: Cardiology;  Laterality: N/A;  8/2013: COLONOSCOPY  5/30/2016: COLONOSCOPY; N/A      Comment:  Procedure: COLONOSCOPY;  Surgeon: Anna Tomas MD;                 Location: [facility];  Service: Endoscopy;  Laterality:                N/A;  7/17/2019: COLONOSCOPY; N/A      Comment:  Procedure: COLONOSCOPY;  Surgeon: David Carter III, MD;  Location: [facility];  Service: Endoscopy;                 Laterality: N/A;  12/14/2023: COLONOSCOPY; N/A      Comment:  Procedure: COLONOSCOPY;  Surgeon: Ama Barrera MD;  Location: [facility];  Service: Endoscopy;                 Laterality: N/A;  7/27/2024: COLONOSCOPY; N/A      Comment:  Procedure: COLONOSCOPY;  Surgeon: Ama Barrera MD;  Location: [facility];  Service: Endoscopy;                 Laterality: N/A;  9/9/2019: COMPUTED TOMOGRAPHY; N/A      Comment:  Procedure: CT (COMPUTED TOMOGRAPHY);  Surgeon: [facility]                Diagnostic Provider;  Location: [facility] OR;  Service:                 General;  Laterality: N/A;  Microwave ablation to be done               in CT.Need CRNA and cart  1/25/2022: COMPUTED TOMOGRAPHY; N/A      Comment:  Procedure: Liver Microwave Ablation;  Surgeon: Red Puentes MD;  Location: [facility];  Service: General;                 Laterality: N/A;  11/5/2018: CORONARY ARTERY BYPASS GRAFT (CABG); N/A      Comment:  Procedure: CORONARY ARTERY BYPASS GRAFT (CABG);                 Surgeon: Bear De Luna MD;  Location: [facility];                 Service: Cardiothoracic;  Laterality: N/A;  TWO VESSEL                BYPASS WITH AORTOTOMY AND EXCISION OF ATHEROSCLEROTIC                PLAQUE  3/2/2020: CORONARY BYPASS GRAFT ANGIOGRAPHY      Comment:  Procedure: Bypass graft study;  Surgeon: Cora Cormier MD;  Location: [facility] CATH LAB;  Service: Cardiology;;  8/27/2024: CORONARY BYPASS GRAFT ANGIOGRAPHY      Comment:  Procedure: Bypass graft study;  Surgeon: Stanton Jefferson MD;  Location: [facility] CATH LAB;  Service:                Cardiology;;  6/24/2024: ECHOCARDIOGRAM,TRANSESOPHAGEAL; N/A      Comment:  Procedure: Transesophageal echo (BERNARD) intra-procedure                log documentation;  Surgeon: Nacho Downs MD;                 Location: [facility] EP LAB;  Service: Cardiology;  Laterality:               N/A;  2010: ELBOW BURSA SURGERY; Right  11/5/2018: ENDOSCOPIC HARVEST OF VEIN; Left      Comment:  Procedure: SURGICAL PROCUREMENT, VEIN, ENDOSCOPIC;                 Surgeon: Bear De Luna MD;  Location: [facility];                 Service: Cardiothoracic;  Laterality: Left;  7/17/2019: ESOPHAGOGASTRODUODENOSCOPY; N/A      Comment:  Procedure: ESOPHAGOGASTRODUODENOSCOPY (EGD);  Surgeon:                David Carter III, MD;  Location: [facility] ENDO;  Service:               Endoscopy;  Laterality: N/A;  12/29/2022: ESOPHAGOGASTRODUODENOSCOPY; N/A      Comment:  Procedure: ESOPHAGOGASTRODUODENOSCOPY (EGD);  Surgeon:                 Issa Gayle MD;  Location: [facility];  Service:               Endoscopy;  Laterality: N/A;  1/17/2024: ESOPHAGOGASTRODUODENOSCOPY; N/A      Comment:  Procedure: EGD (ESOPHAGOGASTRODUODENOSCOPY);  Surgeon:                Issa Gayle MD;  Location: [facility];                 Service: Endoscopy;  Laterality: N/A;  4/30/2024: ESOPHAGOGASTRODUODENOSCOPY; N/A      Comment:  Procedure: EGD (ESOPHAGOGASTRODUODENOSCOPY);  Surgeon:                Eder Foley MD;  Location: [facility];  Service:                Gastroenterology;  Laterality: N/A;  7/27/2024: ESOPHAGOGASTRODUODENOSCOPY; N/A      Comment:  Procedure: EGD (ESOPHAGOGASTRODUODENOSCOPY);  Surgeon:                Ama Barrera MD;  Location: [facility];  Service:                Endoscopy;  Laterality: N/A;  3/27/2019: ETHMOIDECTOMY; Bilateral      Comment:  Procedure: ETHMOIDECTOMY;  Surgeon: Alejandro Parkinson MD;                 Location: [facility];  Service: ENT;  Laterality: Bilateral;  No date: EYE SURGERY  No date: FOOT SURGERY  3/27/2019: FRONTAL SINUS OBLITERATION; Bilateral      Comment:  Procedure: SINUSOTOMY, FRONTAL SINUS, OBLITERATIVE;                 Surgeon: Alejandro Parkinson MD;  Location: [facility];  Service:                ENT;  Laterality: Bilateral;  3/27/2019: FUNCTIONAL ENDOSCOPIC SINUS SURGERY (FESS); Bilateral      Comment:  Procedure: FESS (FUNCTIONAL ENDOSCOPIC SINUS SURGERY);                 Surgeon: Alejandro Parkinson MD;  Location: [facility] OR;  Service:                ENT;  Laterality: Bilateral;  Left Keila bullosa                resection   2/2/2024: INTRALUMINAL GASTROINTESTINAL TRACT IMAGING VIA CAPSULE; N/A      Comment:  Procedure: IMAGING PROCEDURE, GI TRACT, INTRALUMINAL,                VIA CAPSULE;  Surgeon: Cooper Estrella RN;  Location: [facility];  Service: Endoscopy;  Laterality: N/A;  7/31/2024: INTRALUMINAL GASTROINTESTINAL TRACT IMAGING VIA CAPSULE; N/  A      Comment:  Procedure: IMAGING PROCEDURE, GI  TRACT, INTRALUMINAL,                VIA CAPSULE;  Surgeon: Cooper Estrella RN;  Location: [facility]                ENDO;  Service: Endoscopy;  Laterality: N/A;  06/2019: KNEE ARTHROSCOPY W/ MENISCAL REPAIR; Left      Comment:  dr boykin  No date: KNEE SURGERY; Right  3/2/2020: LEFT HEART CATHETERIZATION; Left      Comment:  Procedure: CATHETERIZATION, HEART, LEFT;  Surgeon: Cora Corimer MD;  Location: [facility] CATH LAB;  Service:                Cardiology;  Laterality: Left;  01/2022: LIVER BIOPSY  3/27/2019: MAXILLARY ANTROSTOMY; Bilateral      Comment:  Procedure: MAXILLARY ANTROSTOMY;  Surgeon: Alejandro Parkinson MD;  Location: [facility];  Service: ENT;  Laterality:                Bilateral;  3/27/2019: NASAL SEPTOPLASTY; N/A      Comment:  Procedure: SEPTOPLASTY, NOSE;  Surgeon: Alejandro Parkinson MD;                 Location: [facility];  Service: ENT;  Laterality: N/A;  2012: RECTAL SURGERY  8/27/2024: RIGHT HEART CATHETERIZATION; Right      Comment:  Procedure: INSERTION, CATHETER, RIGHT HEART;  Surgeon:                Stanton Jefferson MD;  Location: [facility] CATH LAB;                 Service: Cardiology;  Laterality: Right;  1/4/2024: STENT, SUPERIOR MESENTERIC ARTERY; Left      Comment:  Procedure: STENT, SUPERIOR MESENTERIC ARTERY;  Surgeon:                ALEX Alfred III, MD;  Location: [address];                 Service: Vascular;  Laterality: Left;  6/19/2018: TRANSURETHRAL RESECTION OF PROSTATE (TURP) WITHOUT USE OF   LASER; N/A      Comment:  Procedure: TURP, WITHOUT USING LASER;  Surgeon: Cooper Cordova IV, MD;  Location: [facility];  Service: Urology;                 Laterality: N/A;  No date: TRIGGER FINGER RELEASE; Left  8/27/2024: VALVE STUDY-AORTIC      Comment:  Procedure: Valve study-aortic;  Surgeon: Stanton Jefferson MD;  Location: [facility] CATH LAB;  Service:                Cardiology;;    Social History    Socioeconomic History      Marital status:        Spouse name: YAEL      Number of children: 2    Tobacco Use      Smoking status: Former        Packs/day: 0.00        Years: 0.5 packs/day for 4.0 years (2.0 ttl pk-yrs)        Types: Cigarettes        Start date: 1968        Quit date: 1972        Years since quittin.4      Smokeless tobacco: Former        Types: Chew        Quit date: 1976    Substance and Sexual Activity      Alcohol use: Not Currently      Drug use: No      Sexual activity: Yes        Partners: Female    Social History Narrative      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       wife passed away       No pets or smokers in household, lives alone.      Review of patient's family history indicates:  Problem: Heart disease      Relation: Mother          Name:               Age of Onset: (Not Specified)  Problem: Heart disease      Relation: Father          Name:               Age of Onset: (Not Specified)  Problem: Heart disease      Relation: Brother          Name:               Age of Onset: (Not Specified)  Problem: Colon cancer      Relation: Neg Hx          Name:               Age of Onset: (Not Specified)      Current Outpatient Medications:  ACCU-CHEK GRACE PLUS METER Misc, AS DIRECTED, Disp: 1 each, Rfl: 0  albuterol (PROVENTIL HFA) 90 mcg/actuation inhaler, Inhale 2 puffs into the lungs every 6 (six) hours as needed for Wheezing. Rescue, Disp: , Rfl:   amLODIPine (NORVASC) 5 MG tablet, Take 1 tablet (5 mg total) by mouth once daily., Disp: 90 tablet, Rfl: 3  aspirin (ECOTRIN) 81 MG EC tablet, Take 1 tablet (81 mg total) by mouth once daily., Disp: 90 tablet, Rfl: 3  blood sugar diagnostic (ACCU-CHEK GRACE PLUS TEST STRP) Strp, TEST THREE TIMES A DAY, Disp: 100 strip, Rfl: 11  budesonide-formoterol 160-4.5 mcg (SYMBICORT) 160-4.5 mcg/actuation HFAA, INHALE 2 PUFFS INTO THE LUNGS TWO TIMES A DAY., Disp: 10.2 g, Rfl: 11  carvediloL (COREG) 3.125 MG tablet, Take 1  tablet (3.125 mg total) by mouth 2 (two) times daily with meals., Disp: 60 tablet, Rfl: 6  cimetidine (TAGAMET) 300 MG tablet, Take one tablet by mouth on 11/7/2024 at 4:15 pm, one tablet on 11/7/2024 at 10:15 pm, and then one tablet on 11/8/2024 at 5:15 am for pre-procedure, Disp: 3 tablet, Rfl: 0  diphenhydrAMINE (BENADRYL) 50 MG capsule, Take one tablet by mouth on 11/7/2024 at 4:15 pm, one tablet on 11/7/2024 at 10:15 pm, and then one tablet on 11/8/2024 at 5:15 am for pre-procedure, Disp: 3 capsule, Rfl: 0  empagliflozin (JARDIANCE) 25 mg tablet, Take 1 tablet (25 mg total) by mouth once daily., Disp: 90 tablet, Rfl: 3  famotidine (PEPCID) 40 MG tablet, Take 40 mg by mouth once daily., Disp: , Rfl:   finasteride (PROSCAR) 5 mg tablet, TAKE 1 TABLET BY MOUTH once daily, Disp: 90 tablet, Rfl: 3  furosemide (LASIX) 40 MG tablet, Take 1 tablet (40 mg total) by mouth 2 (two) times daily., Disp: 60 tablet, Rfl: 11  GLUCOSAMINE HCL/CHONDR ROSARIO A NA (OSTEO BI-FLEX ORAL), Take 1 tablet by mouth 2 (two) times daily. , Disp: , Rfl:   inclisiran (LEQVIO) 284 mg/1.5 mL Syrg subcutaneous injection, Inject 1.5 mLs (284 mg total) into the skin every 3 (three) months., Disp: 1.5 mL, Rfl: 1  inclisiran (LEQVIO) 284 mg/1.5 mL Syrg subcutaneous injection, Inject 1.5 mLs (284 mg total) into the skin every 6 (six) months., Disp: 1.5 mL, Rfl: 1  krill oil 500 mg Cap, Take 1 capsule by mouth Daily., Disp: , Rfl:   lancets (ACCU-CHEK FASTCLIX LANCET DRUM) Misc, TEST TWO TIMES A DAY, Disp: 200 each, Rfl: 5  LANTUS SOLOSTAR U-100 INSULIN 100 unit/mL (3 mL) InPn pen, INJECT 44 UNITS UNDER THE SKIN EVERY EVENING, Disp: 45 mL, Rfl: 0  levothyroxine (SYNTHROID, LEVOTHROID) 175 MCG tablet, Take 2 tablets (350 mcg total) by mouth before breakfast., Disp: 180 tablet, Rfl: 1  lisinopriL 10 MG tablet, Take 1 tablet by mouth once daily, Disp: 90 tablet, Rfl: 3  metFORMIN (GLUCOPHAGE-XR) 500 MG ER 24hr tablet, Take 1 tablet (500 mg total) by mouth 2  "(two) times daily with meals., Disp: 180 tablet, Rfl: 3  montelukast (SINGULAIR) 10 mg tablet, Take 1 tablet (10 mg total) by mouth once daily., Disp: 90 tablet, Rfl: 3  pen needle, diabetic (BD ULTRA-FINE MINI PEN NEEDLE) 31 gauge x 3/16" Ndle, USE AS DIRECTED, Disp: 100 each, Rfl: 11  RABEprazole (ACIPHEX) 20 mg tablet, Take 1 tablet (20 mg total) by mouth 2 (two) times daily., Disp: 180 tablet, Rfl: 3  ranolazine (RANEXA) 1,000 mg Tb12, Take 1 tablet (1,000 mg total) by mouth 2 (two) times daily., Disp: 60 tablet, Rfl: 2  REPATHA SURECLICK 140 mg/mL PnIj, INJECT 140mg subcutaneously every 14 days, Disp: 2 mL, Rfl: 11  rivaroxaban (XARELTO) 20 mg Tab, Take 20 mg by mouth daily with dinner or evening meal., Disp: , Rfl:   clopidogreL (PLAVIX) 75 mg tablet, Take 1 tablet (75 mg total) by mouth once daily., Disp: 30 tablet, Rfl: 11  isosorbide mononitrate (IMDUR) 30 MG 24 hr tablet, Take 1 tablet (30 mg total) by mouth once daily., Disp: 30 tablet, Rfl: 0  mirabegron (MYRBETRIQ) 50 mg Tb24, Take 1 tablet (50 mg total) by mouth once daily., Disp: 30 tablet, Rfl: 5    No current facility-administered medications for this visit.  Facility-Administered Medications Ordered in Other Visits:  lactated ringers infusion, , Intravenous, Continuous, Omaira Theodore MD, New Bag at 09/09/19 1117              Review of Systems   Constitutional: Negative. Negative for weight gain.   HENT: Negative.     Eyes: Negative.    Cardiovascular: Negative.  Negative for chest pain, leg swelling and palpitations.   Respiratory: Negative.  Negative for shortness of breath.    Endocrine: Negative.    Hematologic/Lymphatic: Negative.    Skin: Negative.    Musculoskeletal:  Negative for muscle weakness.   Gastrointestinal: Negative.    Genitourinary: Negative.    Neurological: Negative.  Negative for dizziness.   Psychiatric/Behavioral: Negative.     Allergic/Immunologic: Negative.    All other systems reviewed and are negative.       "   Objective     Physical Exam  Vitals and nursing note reviewed.   Constitutional:       Appearance: He is well-developed.   HENT:      Head: Normocephalic and atraumatic.   Eyes:      Conjunctiva/sclera: Conjunctivae normal.      Pupils: Pupils are equal, round, and reactive to light.   Cardiovascular:      Rate and Rhythm: Normal rate and regular rhythm.      Pulses: Intact distal pulses.      Heart sounds: Normal heart sounds. Murmur: 2/6 AS murmur.   Pulmonary:      Effort: Pulmonary effort is normal.      Breath sounds: Normal breath sounds.   Abdominal:      General: Bowel sounds are normal.      Palpations: Abdomen is soft.   Musculoskeletal:      Cervical back: Normal range of motion and neck supple.   Skin:     General: Skin is warm and dry.   Neurological:      Mental Status: He is alert and oriented to person, place, and time.            Assessment and Plan     1. Paroxysmal atrial fibrillation        Plan:  79 yoM here for LAAO. He is scheduled for LAAO 11/8/24. All questions addressed and answered. Will use plavix alone after implant.

## 2024-11-07 ENCOUNTER — ANESTHESIA EVENT (OUTPATIENT)
Dept: MEDSURG UNIT | Facility: HOSPITAL | Age: 79
End: 2024-11-07
Payer: MEDICARE

## 2024-11-07 ENCOUNTER — TELEPHONE (OUTPATIENT)
Dept: ELECTROPHYSIOLOGY | Facility: CLINIC | Age: 79
End: 2024-11-07
Payer: MEDICARE

## 2024-11-07 LAB
OHS QRS DURATION: 84 MS
OHS QTC CALCULATION: 424 MS

## 2024-11-07 NOTE — TELEPHONE ENCOUNTER
Spoke to patient     CONFIRMED procedure arrival time of 5:15 AM on 11/8/2024    Reiterated instructions including:  -Directions to check in desk  -NPO after midnight night prior to procedure\  -Patient allowed to drink water up until 2 hours prior to arrival due to IV fluid shortage.   -High importance of HOLDING Insulin, Metformin, and Jardiance the morning of procedure. Patient verbalized understanding.   -Confirmed compliance of Lantus. Instructed to take half of evening dose on 11/7/2024. Patient verbalized understanding.   -Pre-procedure LABS Reviewed. No alerts noted   -Confirmed absence of implanted device/stimulator   -Confirmed no fever, cough, or shortness of breath in the past 30 days  -Confirmed no redness, rash, irritation, or yeast infection to groin area.   -Confirmed Contrast Prep Instructions of taking Benadryl 50 mg, Tagamet 300 mg, and Prednisone 50 mg 13 hrs, 7 hrs, and 1 hr prior to procedure. Reviewed with patient. Patient verbalized understanding.     -Reviewed current visitor policy    Patient verbalized understanding of above and appreciated the call.

## 2024-11-08 ENCOUNTER — ANESTHESIA (OUTPATIENT)
Dept: MEDSURG UNIT | Facility: HOSPITAL | Age: 79
End: 2024-11-08
Payer: MEDICARE

## 2024-11-08 ENCOUNTER — HOSPITAL ENCOUNTER (INPATIENT)
Facility: HOSPITAL | Age: 79
LOS: 1 days | Discharge: HOME | DRG: 274 | End: 2024-11-08
Attending: INTERNAL MEDICINE | Admitting: INTERNAL MEDICINE
Payer: MEDICARE

## 2024-11-08 VITALS
SYSTOLIC BLOOD PRESSURE: 157 MMHG | OXYGEN SATURATION: 99 % | RESPIRATION RATE: 20 BRPM | TEMPERATURE: 98 F | WEIGHT: 225 LBS | BODY MASS INDEX: 35.31 KG/M2 | HEART RATE: 84 BPM | HEIGHT: 67 IN | DIASTOLIC BLOOD PRESSURE: 85 MMHG

## 2024-11-08 DIAGNOSIS — I48.0 PAROXYSMAL ATRIAL FIBRILLATION: ICD-10-CM

## 2024-11-08 DIAGNOSIS — I48.91 ATRIAL FIBRILLATION: ICD-10-CM

## 2024-11-08 DIAGNOSIS — I49.9 ARRHYTHMIA: ICD-10-CM

## 2024-11-08 DIAGNOSIS — Z01.812 PRE-PROCEDURE LAB EXAM: ICD-10-CM

## 2024-11-08 LAB
ABO + RH BLD: NORMAL
BLD GP AB SCN CELLS X3 SERPL QL: NORMAL
OHS QRS DURATION: 88 MS
OHS QTC CALCULATION: 457 MS
POCT GLUCOSE: 175 MG/DL (ref 70–110)
POCT GLUCOSE: 196 MG/DL (ref 70–110)
SPECIMEN OUTDATE: NORMAL

## 2024-11-08 PROCEDURE — 25000003 PHARM REV CODE 250: Performed by: NURSE ANESTHETIST, CERTIFIED REGISTERED

## 2024-11-08 PROCEDURE — C1889 IMPLANT/INSERT DEVICE, NOC: HCPCS | Performed by: INTERNAL MEDICINE

## 2024-11-08 PROCEDURE — 25500020 PHARM REV CODE 255: Performed by: INTERNAL MEDICINE

## 2024-11-08 PROCEDURE — 93010 ELECTROCARDIOGRAM REPORT: CPT | Mod: ,,, | Performed by: INTERNAL MEDICINE

## 2024-11-08 PROCEDURE — 37000009 HC ANESTHESIA EA ADD 15 MINS: Performed by: INTERNAL MEDICINE

## 2024-11-08 PROCEDURE — 63600175 PHARM REV CODE 636 W HCPCS: Performed by: NURSE ANESTHETIST, CERTIFIED REGISTERED

## 2024-11-08 PROCEDURE — 93005 ELECTROCARDIOGRAM TRACING: CPT

## 2024-11-08 PROCEDURE — 63600175 PHARM REV CODE 636 W HCPCS: Performed by: INTERNAL MEDICINE

## 2024-11-08 PROCEDURE — C1760 CLOSURE DEV, VASC: HCPCS | Performed by: INTERNAL MEDICINE

## 2024-11-08 PROCEDURE — 27201423 OPTIME MED/SURG SUP & DEVICES STERILE SUPPLY: Performed by: INTERNAL MEDICINE

## 2024-11-08 PROCEDURE — 82962 GLUCOSE BLOOD TEST: CPT | Performed by: INTERNAL MEDICINE

## 2024-11-08 PROCEDURE — C1769 GUIDE WIRE: HCPCS | Performed by: INTERNAL MEDICINE

## 2024-11-08 PROCEDURE — C1894 INTRO/SHEATH, NON-LASER: HCPCS | Performed by: INTERNAL MEDICINE

## 2024-11-08 PROCEDURE — 37000008 HC ANESTHESIA 1ST 15 MINUTES: Performed by: INTERNAL MEDICINE

## 2024-11-08 PROCEDURE — 33340 PERQ CLSR TCAT L ATR APNDGE: CPT | Mod: Q0,,, | Performed by: INTERNAL MEDICINE

## 2024-11-08 PROCEDURE — 86920 COMPATIBILITY TEST SPIN: CPT | Performed by: NURSE PRACTITIONER

## 2024-11-08 PROCEDURE — 33340 PERQ CLSR TCAT L ATR APNDGE: CPT | Mod: Q0 | Performed by: INTERNAL MEDICINE

## 2024-11-08 PROCEDURE — 11000001 HC ACUTE MED/SURG PRIVATE ROOM

## 2024-11-08 PROCEDURE — 02L73DK OCCLUSION OF LEFT ATRIAL APPENDAGE WITH INTRALUMINAL DEVICE, PERCUTANEOUS APPROACH: ICD-10-PCS | Performed by: INTERNAL MEDICINE

## 2024-11-08 PROCEDURE — 86900 BLOOD TYPING SEROLOGIC ABO: CPT | Performed by: NURSE PRACTITIONER

## 2024-11-08 DEVICE — LEFT ATRIAL APPENDAGE CLOSURE DEVICE WITH DELIVERY SYSTEM
Type: IMPLANTABLE DEVICE | Site: HEART | Status: FUNCTIONAL
Brand: WATCHMAN FLX™ PRO

## 2024-11-08 RX ORDER — IODIXANOL 320 MG/ML
INJECTION, SOLUTION INTRAVASCULAR
Status: DISCONTINUED | OUTPATIENT
Start: 2024-11-08 | End: 2024-11-08 | Stop reason: HOSPADM

## 2024-11-08 RX ORDER — PROTAMINE SULFATE 10 MG/ML
INJECTION, SOLUTION INTRAVENOUS
Status: DISCONTINUED | OUTPATIENT
Start: 2024-11-08 | End: 2024-11-08

## 2024-11-08 RX ORDER — ONDANSETRON HYDROCHLORIDE 2 MG/ML
4 INJECTION, SOLUTION INTRAVENOUS ONCE AS NEEDED
Status: DISCONTINUED | OUTPATIENT
Start: 2024-11-08 | End: 2024-11-08 | Stop reason: HOSPADM

## 2024-11-08 RX ORDER — FENTANYL CITRATE 50 UG/ML
INJECTION, SOLUTION INTRAMUSCULAR; INTRAVENOUS
Status: DISCONTINUED | OUTPATIENT
Start: 2024-11-08 | End: 2024-11-08

## 2024-11-08 RX ORDER — HEPARIN SODIUM 1000 [USP'U]/ML
INJECTION, SOLUTION INTRAVENOUS; SUBCUTANEOUS
Status: DISCONTINUED | OUTPATIENT
Start: 2024-11-08 | End: 2024-11-08

## 2024-11-08 RX ORDER — ETOMIDATE 2 MG/ML
INJECTION INTRAVENOUS
Status: DISCONTINUED | OUTPATIENT
Start: 2024-11-08 | End: 2024-11-08

## 2024-11-08 RX ORDER — LIDOCAINE HYDROCHLORIDE 20 MG/ML
INJECTION, SOLUTION INFILTRATION; PERINEURAL
Status: DISCONTINUED | OUTPATIENT
Start: 2024-11-08 | End: 2024-11-08 | Stop reason: HOSPADM

## 2024-11-08 RX ORDER — LIDOCAINE HYDROCHLORIDE 20 MG/ML
INJECTION, SOLUTION EPIDURAL; INFILTRATION; INTRACAUDAL; PERINEURAL
Status: DISCONTINUED | OUTPATIENT
Start: 2024-11-08 | End: 2024-11-08

## 2024-11-08 RX ORDER — DIPHENHYDRAMINE HYDROCHLORIDE 50 MG/ML
25 INJECTION INTRAMUSCULAR; INTRAVENOUS EVERY 6 HOURS PRN
Status: DISCONTINUED | OUTPATIENT
Start: 2024-11-08 | End: 2024-11-08 | Stop reason: HOSPADM

## 2024-11-08 RX ORDER — CEFAZOLIN 2 G/1
INJECTION, POWDER, FOR SOLUTION INTRAMUSCULAR; INTRAVENOUS
Status: DISCONTINUED | OUTPATIENT
Start: 2024-11-08 | End: 2024-11-08

## 2024-11-08 RX ORDER — CEFAZOLIN 2 G/1
2 INJECTION, POWDER, FOR SOLUTION INTRAMUSCULAR; INTRAVENOUS
Status: DISCONTINUED | OUTPATIENT
Start: 2024-11-08 | End: 2024-11-08 | Stop reason: HOSPADM

## 2024-11-08 RX ORDER — ACETAMINOPHEN 325 MG/1
650 TABLET ORAL EVERY 4 HOURS PRN
Status: DISCONTINUED | OUTPATIENT
Start: 2024-11-08 | End: 2024-11-08 | Stop reason: HOSPADM

## 2024-11-08 RX ORDER — PROPOFOL 10 MG/ML
VIAL (ML) INTRAVENOUS
Status: DISCONTINUED | OUTPATIENT
Start: 2024-11-08 | End: 2024-11-08

## 2024-11-08 RX ORDER — ROCURONIUM BROMIDE 10 MG/ML
INJECTION, SOLUTION INTRAVENOUS
Status: DISCONTINUED | OUTPATIENT
Start: 2024-11-08 | End: 2024-11-08

## 2024-11-08 RX ORDER — HYDROMORPHONE HYDROCHLORIDE 1 MG/ML
0.2 INJECTION, SOLUTION INTRAMUSCULAR; INTRAVENOUS; SUBCUTANEOUS EVERY 5 MIN PRN
Status: DISCONTINUED | OUTPATIENT
Start: 2024-11-08 | End: 2024-11-08 | Stop reason: HOSPADM

## 2024-11-08 RX ORDER — ONDANSETRON HYDROCHLORIDE 2 MG/ML
INJECTION, SOLUTION INTRAVENOUS
Status: DISCONTINUED | OUTPATIENT
Start: 2024-11-08 | End: 2024-11-08

## 2024-11-08 RX ORDER — FENTANYL CITRATE 50 UG/ML
25 INJECTION, SOLUTION INTRAMUSCULAR; INTRAVENOUS EVERY 5 MIN PRN
Status: DISCONTINUED | OUTPATIENT
Start: 2024-11-08 | End: 2024-11-08 | Stop reason: HOSPADM

## 2024-11-08 RX ORDER — HEPARIN SOD,PORCINE/0.9 % NACL 1000/500ML
INTRAVENOUS SOLUTION INTRAVENOUS
Status: DISCONTINUED | OUTPATIENT
Start: 2024-11-08 | End: 2024-11-08 | Stop reason: HOSPADM

## 2024-11-08 RX ORDER — HYDROCODONE BITARTRATE AND ACETAMINOPHEN 500; 5 MG/1; MG/1
TABLET ORAL
Status: DISCONTINUED | OUTPATIENT
Start: 2024-11-08 | End: 2024-11-08 | Stop reason: HOSPADM

## 2024-11-08 RX ORDER — PROCHLORPERAZINE EDISYLATE 5 MG/ML
5 INJECTION INTRAMUSCULAR; INTRAVENOUS EVERY 30 MIN PRN
Status: DISCONTINUED | OUTPATIENT
Start: 2024-11-08 | End: 2024-11-08 | Stop reason: HOSPADM

## 2024-11-08 RX ADMIN — ETOMIDATE 14 MG: 2 INJECTION, SOLUTION INTRAVENOUS at 07:11

## 2024-11-08 RX ADMIN — HEPARIN SODIUM 10000 UNITS: 1000 INJECTION, SOLUTION INTRAVENOUS; SUBCUTANEOUS at 08:11

## 2024-11-08 RX ADMIN — PROTAMINE SULFATE 40 MG: 10 INJECTION, SOLUTION INTRAVENOUS at 08:11

## 2024-11-08 RX ADMIN — ONDANSETRON 4 MG: 2 INJECTION INTRAMUSCULAR; INTRAVENOUS at 09:11

## 2024-11-08 RX ADMIN — SODIUM CHLORIDE: 0.9 INJECTION, SOLUTION INTRAVENOUS at 07:11

## 2024-11-08 RX ADMIN — FENTANYL CITRATE 100 MCG: 50 INJECTION, SOLUTION INTRAMUSCULAR; INTRAVENOUS at 07:11

## 2024-11-08 RX ADMIN — ROCURONIUM BROMIDE 50 MG: 10 INJECTION INTRAVENOUS at 07:11

## 2024-11-08 RX ADMIN — CEFAZOLIN 2 G: 2 INJECTION, POWDER, FOR SOLUTION INTRAMUSCULAR; INTRAVENOUS at 08:11

## 2024-11-08 RX ADMIN — ROCURONIUM BROMIDE 10 MG: 10 INJECTION INTRAVENOUS at 08:11

## 2024-11-08 RX ADMIN — SUGAMMADEX 200 MG: 100 INJECTION, SOLUTION INTRAVENOUS at 09:11

## 2024-11-08 RX ADMIN — PROTAMINE SULFATE 10 MG: 10 INJECTION, SOLUTION INTRAVENOUS at 08:11

## 2024-11-08 RX ADMIN — ROCURONIUM BROMIDE 20 MG: 10 INJECTION INTRAVENOUS at 07:11

## 2024-11-08 RX ADMIN — PROPOFOL 50 MG: 10 INJECTION, EMULSION INTRAVENOUS at 06:11

## 2024-11-08 RX ADMIN — LIDOCAINE HYDROCHLORIDE 100 MG: 20 INJECTION, SOLUTION EPIDURAL; INFILTRATION; INTRACAUDAL; PERINEURAL at 07:11

## 2024-11-08 NOTE — ANESTHESIA PROCEDURE NOTES
Intubation    Date/Time: 11/8/2024 7:20 AM    Performed by: Nancy Johnston CRNA  Authorized by: Polo Pereira MD    Intubation:     Induction:  Intravenous    Intubated:  Postinduction    Mask Ventilation:  Easy with oral airway    Attempts:  1    Attempted By:  CRNA    Method of Intubation:  Video laryngoscopy    Blade:  Pablo 4    Laryngeal View Grade: Grade I - full view of cords      Difficult Airway Encountered?: No      Complications:  None    Airway Device:  Oral endotracheal tube    Airway Device Size:  7.5    Style/Cuff Inflation:  Cuffed    Inflation Amount (mL):  10    Tube secured:  22    Secured at:  The lips    Placement Verified By:  Capnometry    Complicating Factors:  None    Findings Post-Intubation:  BS equal bilateral

## 2024-11-08 NOTE — HOSPITAL COURSE
Patient underwent successful placement of 31mm watchman device. No complications post op. Perclose groin. Given significant history of GI bleed, instructed patient to taking plavix 75mg daily for 6 months, then will transition to aspirin 81mg daily indefinitely. Will need 45 day post watchman BERNARD.

## 2024-11-08 NOTE — H&P
[facility] Stay Cardiac [facility]  Cardiac Electrophysiology  History and Physical     Admission Date: 11/8/2024  Code Status: Prior   Attending Provider: Horacio Huerta MD   Principal Problem:Paroxysmal atrial fibrillation    Subjective:     Chief Complaint:  GI bleed     HPI:  79 yoM CAD/CABG here for watchman placement.      3/24: AF noted 8/23. He was stared on rythmol but no rhythm control via CV attempt was made. He remained in AF 2/7/24, SR with long first degree AVB later in February. He had a vascular intervention in his abdomen 1/24 with subsequent GI bleeding later that month. He has been off OAC since the GI bleeding event. Xarelto was eventually restarted.      5/24: GI bleeding 4/27/24 with no source identified. Off xarelto since discharge. Remains in SR on rythmol with first degree AVB. Due for PVI 6/24/24, LAAO 6w later     10/24: No sustained recurrence. LAAO scheduled 11/8/24. He is on aspirin alone.     CHADSVASC of 4  HAS BLED of 4  EF 60-65%    Past Medical History:   Diagnosis Date    Anticoagulant long-term use     Aortic stenosis     Asthma     Benign prostatic hyperplasia with nocturia     Bilateral carotid artery stenosis 07/21/2021    US CAROTID BILATERAL 07/14/2021 IMPRESSION: Atherosclerosis with suspected stenosis greater than 50% at the right proximal ICA visually. Velocities are discordant and appear improved from prior. Atherosclerosis on the left with no evidence of flow-limiting stenosis greater than 50%.  FINDINGS: Right: Internal Carotid Artery (ICA): Peak systolic velocity 118 cm/sec. End diastolic velocity 35 cm/sec    BPH (benign prostatic hyperplasia)     CAD (coronary artery disease)     40% lesion 2002;[facility]azo    Cirrhosis     Claudication 04/09/2014    Colon polyp     COPD (chronic obstructive pulmonary disease)     ED (erectile dysfunction)     Encounter for blood transfusion     Ex-smoker     Hearing loss NEC     Hepatitis C     Cured;reji brown 2015     Hyperlipidemia     Hypertension     Hypothyroid     s/p tx graves    Open angle with borderline findings and low glaucoma risk in both eyes 09/22/2020    DELONTE on CPAP     Osteoarthritis     Paroxysmal atrial fibrillation     PVD (peripheral vascular disease)     Secondary esophageal varices without bleeding 06/26/2017    Type 2 diabetes mellitus        Past Surgical History:   Procedure Laterality Date    ABLATION OF ARRHYTHMOGENIC FOCUS FOR ATRIAL FIBRILLATION N/A 6/24/2024    Procedure: Ablation atrial fibrillation;  Surgeon: Horacio Huerta MD;  Location: [facility] EP LAB;  Service: Cardiology;  Laterality: N/A;  afib, PVI, RFA, BERNARD (cx if SR), ZIA, anes, MB, 3 Prep, 3 hours per Dr. Huerta    ANGIOGRAM, CORONARY, WITH LEFT HEART CATHETERIZATION N/A 8/27/2024    Procedure: Angiogram, Coronary, with Left Heart Cath;  Surgeon: Stanton Jefferson MD;  Location: [facility] CATH LAB;  Service: Cardiology;  Laterality: N/A;    ANGIOGRAM, EXTREMITY, UNILATERAL Left 1/4/2024    Procedure: ANGIOGRAM, EXTREMITY, UNILATERAL- Femoral / SMS Stent, Poss General;  Surgeon: ALEX Alfred III, MD;  Location: [facility] OR [address] [place] [address];  Service: Vascular;  Laterality: Left;  mGy : 2698.78  Gy.cm : 229.88  Contrast : 62ml  Fluro time : 13.8min    ARTERIOGRAPHY OF SUBCLAVIAN ARTERY  8/27/2024    Procedure: ARTERIOGRAM, SUBCLAVIAN;  Surgeon: Stanton Jefferson MD;  Location: [facility] CATH LAB;  Service: Cardiology;;    CARDIAC CATHETERIZATION      CARDIAC SURGERY      CARPAL TUNNEL RELEASE Left     CATARACT EXTRACTION      CATARACT EXTRACTION W/ INTRAOCULAR LENS  IMPLANT, BILATERAL  2008    CATHETERIZATION OF BOTH LEFT AND RIGHT HEART N/A 11/2/2018    Procedure: CATHETERIZATION, HEART, BOTH LEFT AND RIGHT;  Surgeon: Frank Gallardo MD;  Location: [facility] CATH LAB;  Service: Cardiology;  Laterality: N/A;    COLONOSCOPY  8/2013    COLONOSCOPY N/A 5/30/2016    Procedure: COLONOSCOPY;  Surgeon: Anna Tomas MD;  Location: [facility] ENDO;  Service: Endoscopy;   Laterality: N/A;    COLONOSCOPY N/A 7/17/2019    Procedure: COLONOSCOPY;  Surgeon: David Carter III, MD;  Location: [facility] ENDO;  Service: Endoscopy;  Laterality: N/A;    COLONOSCOPY N/A 12/14/2023    Procedure: COLONOSCOPY;  Surgeon: Ama Barrera MD;  Location: [facility] ENDO;  Service: Endoscopy;  Laterality: N/A;    COLONOSCOPY N/A 7/27/2024    Procedure: COLONOSCOPY;  Surgeon: Ama Barrera MD;  Location: [facility] ENDO;  Service: Endoscopy;  Laterality: N/A;    COMPUTED TOMOGRAPHY N/A 9/9/2019    Procedure: CT (COMPUTED TOMOGRAPHY);  Surgeon: [facility] Diagnostic Provider;  Location: [facility] OR;  Service: General;  Laterality: N/A;  Microwave ablation to be done in CT.  Need CRNA and cart    COMPUTED TOMOGRAPHY N/A 1/25/2022    Procedure: Liver Microwave Ablation;  Surgeon: Red Puentes MD;  Location: [facility];  Service: General;  Laterality: N/A;    CORONARY ARTERY BYPASS GRAFT (CABG) N/A 11/5/2018    Procedure: CORONARY ARTERY BYPASS GRAFT (CABG);  Surgeon: Bear De Luna MD;  Location: [facility];  Service: Cardiothoracic;  Laterality: N/A;  TWO VESSEL BYPASS WITH AORTOTOMY AND EXCISION OF ATHEROSCLEROTIC PLAQUE    CORONARY BYPASS GRAFT ANGIOGRAPHY  3/2/2020    Procedure: Bypass graft study;  Surgeon: Cora Cormier MD;  Location: [facility] CATH LAB;  Service: Cardiology;;    CORONARY BYPASS GRAFT ANGIOGRAPHY  8/27/2024    Procedure: Bypass graft study;  Surgeon: Stanton Jefferson MD;  Location: [facility] CATH LAB;  Service: Cardiology;;    ECHOCARDIOGRAM,TRANSESOPHAGEAL N/A 6/24/2024    Procedure: Transesophageal echo (BERNARD) intra-procedure log documentation;  Surgeon: Nacho Downs MD;  Location: [facility] EP LAB;  Service: Cardiology;  Laterality: N/A;    ELBOW BURSA SURGERY Right 2010    ENDOSCOPIC HARVEST OF VEIN Left 11/5/2018    Procedure: SURGICAL PROCUREMENT, VEIN, ENDOSCOPIC;  Surgeon: Bear De Luna MD;  Location: [facility];  Service: Cardiothoracic;  Laterality: Left;    ESOPHAGOGASTRODUODENOSCOPY N/A  7/17/2019    Procedure: ESOPHAGOGASTRODUODENOSCOPY (EGD);  Surgeon: David Carter III, MD;  Location: [facility];  Service: Endoscopy;  Laterality: N/A;    ESOPHAGOGASTRODUODENOSCOPY N/A 12/29/2022    Procedure: ESOPHAGOGASTRODUODENOSCOPY (EGD);  Surgeon: Issa Gayle MD;  Location: [facility];  Service: Endoscopy;  Laterality: N/A;    ESOPHAGOGASTRODUODENOSCOPY N/A 1/17/2024    Procedure: EGD (ESOPHAGOGASTRODUODENOSCOPY);  Surgeon: Issa Gayle MD;  Location: [facility];  Service: Endoscopy;  Laterality: N/A;    ESOPHAGOGASTRODUODENOSCOPY N/A 4/30/2024    Procedure: EGD (ESOPHAGOGASTRODUODENOSCOPY);  Surgeon: Eder Foley MD;  Location: [facility];  Service: Gastroenterology;  Laterality: N/A;    ESOPHAGOGASTRODUODENOSCOPY N/A 7/27/2024    Procedure: EGD (ESOPHAGOGASTRODUODENOSCOPY);  Surgeon: Ama Barrera MD;  Location: [facility];  Service: Endoscopy;  Laterality: N/A;    ETHMOIDECTOMY Bilateral 3/27/2019    Procedure: ETHMOIDECTOMY;  Surgeon: Alejandro Parkinson MD;  Location: [facility];  Service: ENT;  Laterality: Bilateral;    EYE SURGERY      FOOT SURGERY      FRONTAL SINUS OBLITERATION Bilateral 3/27/2019    Procedure: SINUSOTOMY, FRONTAL SINUS, OBLITERATIVE;  Surgeon: Alejandro Parkinson MD;  Location: [facility] OR;  Service: ENT;  Laterality: Bilateral;    FUNCTIONAL ENDOSCOPIC SINUS SURGERY (FESS) Bilateral 3/27/2019    Procedure: FESS (FUNCTIONAL ENDOSCOPIC SINUS SURGERY);  Surgeon: Alejandro Parkinson MD;  Location: [facility] OR;  Service: ENT;  Laterality: Bilateral;  Left Keila bullosa resection     INTRALUMINAL GASTROINTESTINAL TRACT IMAGING VIA CAPSULE N/A 2/2/2024    Procedure: IMAGING PROCEDURE, GI TRACT, INTRALUMINAL, VIA CAPSULE;  Surgeon: Cooper Estrella RN;  Location: [facility] ENDO;  Service: Endoscopy;  Laterality: N/A;    INTRALUMINAL GASTROINTESTINAL TRACT IMAGING VIA CAPSULE N/A 7/31/2024    Procedure: IMAGING PROCEDURE, GI TRACT, INTRALUMINAL, VIA CAPSULE;  Surgeon: Cooper Estrella RN;  Location: [facility]  ENDO;  Service: Endoscopy;  Laterality: N/A;    KNEE ARTHROSCOPY W/ MENISCAL REPAIR Left 06/2019    dr boykin    KNEE SURGERY Right     LEFT HEART CATHETERIZATION Left 3/2/2020    Procedure: CATHETERIZATION, HEART, LEFT;  Surgeon: Cora Cormier MD;  Location: [facility] CATH LAB;  Service: Cardiology;  Laterality: Left;    LIVER BIOPSY  01/2022    MAXILLARY ANTROSTOMY Bilateral 3/27/2019    Procedure: MAXILLARY ANTROSTOMY;  Surgeon: Alejandro Parkinson MD;  Location: [facility] OR;  Service: ENT;  Laterality: Bilateral;    NASAL SEPTOPLASTY N/A 3/27/2019    Procedure: SEPTOPLASTY, NOSE;  Surgeon: Alejandro Parkinson MD;  Location: [facility] OR;  Service: ENT;  Laterality: N/A;    RECTAL SURGERY  2012    RIGHT HEART CATHETERIZATION Right 8/27/2024    Procedure: INSERTION, CATHETER, RIGHT HEART;  Surgeon: Stanton Jefferson MD;  Location: [facility] CATH LAB;  Service: Cardiology;  Laterality: Right;    STENT, SUPERIOR MESENTERIC ARTERY Left 1/4/2024    Procedure: STENT, SUPERIOR MESENTERIC ARTERY;  Surgeon: ALEX Alfred III, MD;  Location: [address];  Service: Vascular;  Laterality: Left;    TRANSURETHRAL RESECTION OF PROSTATE (TURP) WITHOUT USE OF LASER N/A 6/19/2018    Procedure: TURP, WITHOUT USING LASER;  Surgeon: Cooper Cordova IV, MD;  Location: [facility] OR;  Service: Urology;  Laterality: N/A;    TRIGGER FINGER RELEASE Left     VALVE STUDY-AORTIC  8/27/2024    Procedure: Valve study-aortic;  Surgeon: Stanton Jefferson MD;  Location: [facility] CATH LAB;  Service: Cardiology;;       Review of patient's allergies indicates:   Allergen Reactions    Lipitor [atorvastatin] Other (See Comments)     Muscle aches and pains as well as fatigue.     Niacin preparations Other (See Comments)     Causes broken blood vessels and bruises at 4 times normal dose.    Statins-hmg-coa reductase inhibitors Other (See Comments)     Statins cause intolerable myalgias.    Iodinated contrast media Hives     Tachycardia    Omeprazole Hives and Itching    Prilosec  "[omeprazole magnesium] Hives and Itching       Current Facility-Administered Medications on File Prior to Encounter   Medication    lactated ringers infusion     Current Outpatient Medications on File Prior to Encounter   Medication Sig    aspirin (ECOTRIN) 81 MG EC tablet Take 1 tablet (81 mg total) by mouth once daily.    budesonide-formoterol 160-4.5 mcg (SYMBICORT) 160-4.5 mcg/actuation HFAA INHALE 2 PUFFS INTO THE LUNGS TWO TIMES A DAY.    famotidine (PEPCID) 40 MG tablet Take 40 mg by mouth once daily.    finasteride (PROSCAR) 5 mg tablet TAKE 1 TABLET BY MOUTH once daily    GLUCOSAMINE HCL/CHONDR ROSARIO A NA (OSTEO BI-FLEX ORAL) Take 1 tablet by mouth 2 (two) times daily.     krill oil 500 mg Cap Take 1 capsule by mouth Daily.    lisinopriL 10 MG tablet Take 1 tablet by mouth once daily    montelukast (SINGULAIR) 10 mg tablet Take 1 tablet (10 mg total) by mouth once daily.    ACCU-CHEK GRACE PLUS METER Misc AS DIRECTED    blood sugar diagnostic (ACCU-CHEK GRACE PLUS TEST STRP) Strp TEST THREE TIMES A DAY    inclisiran (LEQVIO) 284 mg/1.5 mL Syrg subcutaneous injection Inject 1.5 mLs (284 mg total) into the skin every 3 (three) months.    lancets (ACCU-CHEK FASTCLIX LANCET DRUM) Misc TEST TWO TIMES A DAY    pen needle, diabetic (BD ULTRA-FINE MINI PEN NEEDLE) 31 gauge x 3/16" Ndle USE AS DIRECTED    ranolazine (RANEXA) 1,000 mg Tb12 Take 1 tablet (1,000 mg total) by mouth 2 (two) times daily.    rivaroxaban (XARELTO) 20 mg Tab Take 20 mg by mouth daily with dinner or evening meal.     Family History       Problem Relation (Age of Onset)    Heart disease Mother, Father, Brother          Tobacco Use    Smoking status: Former     Current packs/day: 0.00     Average packs/day: 0.5 packs/day for 4.0 years (2.0 ttl pk-yrs)     Types: Cigarettes     Start date: 1968     Quit date: 1972     Years since quittin.4    Smokeless tobacco: Former     Types: Chew     Quit date: 1976   Substance and Sexual " Activity    Alcohol use: Not Currently    Drug use: No    Sexual activity: Yes     Partners: Female     Review of Systems   All other systems reviewed and are negative.    Objective:     Vital Signs (Most Recent):  Temp: 98.4 °F (36.9 °C) (11/08/24 0603)  Pulse: 79 (11/08/24 0603)  Resp: 18 (11/08/24 0603)  BP: (!) 168/82 (11/08/24 0606)  SpO2: 98 % (11/08/24 0603) Vital Signs (24h Range):  Temp:  [98.4 °F (36.9 °C)] 98.4 °F (36.9 °C)  Pulse:  [79] 79  Resp:  [18] 18  SpO2:  [98 %] 98 %  BP: (139-168)/(79-82) 168/82       Weight: 102.1 kg (225 lb)  Body mass index is 35.24 kg/m².    SpO2: 98 %        Physical Exam  Vitals and nursing note reviewed.   Constitutional:       Appearance: Normal appearance.   Cardiovascular:      Rate and Rhythm: Normal rate and regular rhythm.      Pulses: Normal pulses.      Heart sounds: Normal heart sounds.   Pulmonary:      Effort: Pulmonary effort is normal.      Breath sounds: Normal breath sounds.   Musculoskeletal:      Right lower leg: No edema.      Left lower leg: No edema.   Skin:     General: Skin is warm.   Neurological:      Mental Status: He is alert.            Significant Labs: All pertinent lab results from the last 24 hours have been reviewed.    Assessment and Plan:     * Paroxysmal atrial fibrillation  Here for LAAO with Watchman device.  We will plan to use plavix alone.    The risks, benefits and alternatives of the procedure were explained to the patient, patient's family and/or surrogate decision maker. Risks include (but not limited to) bleeding, hematoma, infection, pain, vascular damage, damage to structures surrounding the vasculature, myocardial damage [perforation, valvular damage], cardiac tamponade, device related thrombus, device emobolization potentially requiring surgical intervention, CVA, MI, and death. All questions were answered. Patient is understanding of these risks, and would like to proceed. Consents signed.         Jacqueline Rodriguez,  MD  Cardiac Electrophysiology  [facility] - Short Stay Cardiac Unit

## 2024-11-08 NOTE — Clinical Note
The sheath was exchanged in the right femoral vein. [de-identified] : This is a 68 year  old patient who was referred by Dr. Anuradha Cruz with the chief complaint of having right back mass.  He reports having this condition for since he was  14 years old He denies any trauma to the area.   He denies any fever or  night sweats. Appetite is good and weight is stable.  He states that recently the mass started to  get  bigger and  more symptomatic. He wants to know if it could  be surgically  removed.\par \par PERTINENT HISTORY: \par Mr. CHEEMA  is s/p right inguinal hernia repair on 05/18/2018 by DR Osorio.  no

## 2024-11-08 NOTE — SUBJECTIVE & OBJECTIVE
Past Medical History:   Diagnosis Date    Anticoagulant long-term use     Aortic stenosis     Asthma     Benign prostatic hyperplasia with nocturia     Bilateral carotid artery stenosis 07/21/2021    US CAROTID BILATERAL 07/14/2021 IMPRESSION: Atherosclerosis with suspected stenosis greater than 50% at the right proximal ICA visually. Velocities are discordant and appear improved from prior. Atherosclerosis on the left with no evidence of flow-limiting stenosis greater than 50%.  FINDINGS: Right: Internal Carotid Artery (ICA): Peak systolic velocity 118 cm/sec. 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 09/22/2020    DELONTE on CPAP     Osteoarthritis     Paroxysmal atrial fibrillation     PVD (peripheral vascular disease)     Secondary esophageal varices without bleeding 06/26/2017    Type 2 diabetes mellitus        Past Surgical History:   Procedure Laterality Date    ABLATION OF ARRHYTHMOGENIC FOCUS FOR ATRIAL FIBRILLATION N/A 6/24/2024    Procedure: Ablation atrial fibrillation;  Surgeon: Horacio Huerta MD;  Location: [facility] EP LAB;  Service: Cardiology;  Laterality: N/A;  afib, PVI, RFA, BERNARD (cx if SR), ZIA, anes, MB, 3 Prep, 3 hours per Dr. Huerta    ANGIOGRAM, CORONARY, WITH LEFT HEART CATHETERIZATION N/A 8/27/2024    Procedure: Angiogram, Coronary, with Left Heart Cath;  Surgeon: Stanton Jefferson MD;  Location: [facility] CATH LAB;  Service: Cardiology;  Laterality: N/A;    ANGIOGRAM, EXTREMITY, UNILATERAL Left 1/4/2024     Procedure: ANGIOGRAM, EXTREMITY, UNILATERAL- Femoral / SMS Stent, Poss General;  Surgeon: ALEX Alfred III, MD;  Location: [facility] OR [facility] FLR;  Service: Vascular;  Laterality: Left;  mGy : 2698.78  Gy.cm : 229.88  Contrast : 62ml  Fluro time : 13.8min    ARTERIOGRAPHY OF SUBCLAVIAN ARTERY  8/27/2024    Procedure: ARTERIOGRAM, SUBCLAVIAN;  Surgeon: Stanton Jefferson MD;  Location: [facility] CATH LAB;  Service: Cardiology;;    CARDIAC CATHETERIZATION      CARDIAC SURGERY      CARPAL TUNNEL RELEASE Left     CATARACT EXTRACTION      CATARACT EXTRACTION W/ INTRAOCULAR LENS  IMPLANT, BILATERAL  2008    CATHETERIZATION OF BOTH LEFT AND RIGHT HEART N/A 11/2/2018    Procedure: CATHETERIZATION, HEART, BOTH LEFT AND RIGHT;  Surgeon: Frank Gallardo MD;  Location: [facility] CATH LAB;  Service: Cardiology;  Laterality: N/A;    COLONOSCOPY  8/2013    COLONOSCOPY N/A 5/30/2016    Procedure: COLONOSCOPY;  Surgeon: Anna Tomas MD;  Location: [facility] ENDO;  Service: Endoscopy;  Laterality: N/A;    COLONOSCOPY N/A 7/17/2019    Procedure: COLONOSCOPY;  Surgeon: David Carter III, MD;  Location: [facility];  Service: Endoscopy;  Laterality: N/A;    COLONOSCOPY N/A 12/14/2023    Procedure: COLONOSCOPY;  Surgeon: Ama Barrera MD;  Location: [facility] ENDO;  Service: Endoscopy;  Laterality: N/A;    COLONOSCOPY N/A 7/27/2024    Procedure: COLONOSCOPY;  Surgeon: Ama Barrera MD;  Location: [facility];  Service: Endoscopy;  Laterality: N/A;    COMPUTED TOMOGRAPHY N/A 9/9/2019    Procedure: CT (COMPUTED TOMOGRAPHY);  Surgeon: [facility] Diagnostic Provider;  Location: [facility] OR;  Service: General;  Laterality: N/A;  Microwave ablation to be done in CT.  Need CRNA and cart    COMPUTED TOMOGRAPHY N/A 1/25/2022    Procedure: Liver Microwave Ablation;  Surgeon: Red Puentes MD;  Location: [facility];  Service: General;  Laterality: N/A;    CORONARY ARTERY BYPASS GRAFT (CABG) N/A 11/5/2018    Procedure: CORONARY ARTERY BYPASS GRAFT (CABG);  Surgeon:  Bear De Luna MD;  Location: [facility] OR;  Service: Cardiothoracic;  Laterality: N/A;  TWO VESSEL BYPASS WITH AORTOTOMY AND EXCISION OF ATHEROSCLEROTIC PLAQUE    CORONARY BYPASS GRAFT ANGIOGRAPHY  3/2/2020    Procedure: Bypass graft study;  Surgeon: Cora Cormier MD;  Location: [facility] CATH LAB;  Service: Cardiology;;    CORONARY BYPASS GRAFT ANGIOGRAPHY  8/27/2024    Procedure: Bypass graft study;  Surgeon: Stanton Jefferson MD;  Location: [facility] CATH LAB;  Service: Cardiology;;    ECHOCARDIOGRAM,TRANSESOPHAGEAL N/A 6/24/2024    Procedure: Transesophageal echo (BERNARD) intra-procedure log documentation;  Surgeon: Nacho Downs MD;  Location: [facility] EP LAB;  Service: Cardiology;  Laterality: N/A;    ELBOW BURSA SURGERY Right 2010    ENDOSCOPIC HARVEST OF VEIN Left 11/5/2018    Procedure: SURGICAL PROCUREMENT, VEIN, ENDOSCOPIC;  Surgeon: Bear De Luna MD;  Location: [facility];  Service: Cardiothoracic;  Laterality: Left;    ESOPHAGOGASTRODUODENOSCOPY N/A 7/17/2019    Procedure: ESOPHAGOGASTRODUODENOSCOPY (EGD);  Surgeon: David Carter III, MD;  Location: [facility];  Service: Endoscopy;  Laterality: N/A;    ESOPHAGOGASTRODUODENOSCOPY N/A 12/29/2022    Procedure: ESOPHAGOGASTRODUODENOSCOPY (EGD);  Surgeon: Issa Gayle MD;  Location: [facility];  Service: Endoscopy;  Laterality: N/A;    ESOPHAGOGASTRODUODENOSCOPY N/A 1/17/2024    Procedure: EGD (ESOPHAGOGASTRODUODENOSCOPY);  Surgeon: Issa Gayle MD;  Location: [facility];  Service: Endoscopy;  Laterality: N/A;    ESOPHAGOGASTRODUODENOSCOPY N/A 4/30/2024    Procedure: EGD (ESOPHAGOGASTRODUODENOSCOPY);  Surgeon: Eder Foley MD;  Location: [facility];  Service: Gastroenterology;  Laterality: N/A;    ESOPHAGOGASTRODUODENOSCOPY N/A 7/27/2024    Procedure: EGD (ESOPHAGOGASTRODUODENOSCOPY);  Surgeon: Ama Barrera MD;  Location: [facility];  Service: Endoscopy;  Laterality: N/A;    ETHMOIDECTOMY Bilateral 3/27/2019    Procedure:  ETHMOIDECTOMY;  Surgeon: Alejandro Parkinson MD;  Location: [facility] OR;  Service: ENT;  Laterality: Bilateral;    EYE SURGERY      FOOT SURGERY      FRONTAL SINUS OBLITERATION Bilateral 3/27/2019    Procedure: SINUSOTOMY, FRONTAL SINUS, OBLITERATIVE;  Surgeon: Alejandro Parkinson MD;  Location: [facility] OR;  Service: ENT;  Laterality: Bilateral;    FUNCTIONAL ENDOSCOPIC SINUS SURGERY (FESS) Bilateral 3/27/2019    Procedure: FESS (FUNCTIONAL ENDOSCOPIC SINUS SURGERY);  Surgeon: Alejandro Parkinson MD;  Location: [facility] OR;  Service: ENT;  Laterality: Bilateral;  Left Keila bullosa resection     INTRALUMINAL GASTROINTESTINAL TRACT IMAGING VIA CAPSULE N/A 2/2/2024    Procedure: IMAGING PROCEDURE, GI TRACT, INTRALUMINAL, VIA CAPSULE;  Surgeon: Cooper Estrella, RN;  Location: [facility] ENDO;  Service: Endoscopy;  Laterality: N/A;    INTRALUMINAL GASTROINTESTINAL TRACT IMAGING VIA CAPSULE N/A 7/31/2024    Procedure: IMAGING PROCEDURE, GI TRACT, INTRALUMINAL, VIA CAPSULE;  Surgeon: Cooper Estrella RN;  Location: [facility] ENDO;  Service: Endoscopy;  Laterality: N/A;    KNEE ARTHROSCOPY W/ MENISCAL REPAIR Left 06/2019    dr boykin    KNEE SURGERY Right     LEFT HEART CATHETERIZATION Left 3/2/2020    Procedure: CATHETERIZATION, HEART, LEFT;  Surgeon: Cora Cormier MD;  Location: [facility] CATH LAB;  Service: Cardiology;  Laterality: Left;    LIVER BIOPSY  01/2022    MAXILLARY ANTROSTOMY Bilateral 3/27/2019    Procedure: MAXILLARY ANTROSTOMY;  Surgeon: Alejandro Parkinson MD;  Location: [facility] OR;  Service: ENT;  Laterality: Bilateral;    NASAL SEPTOPLASTY N/A 3/27/2019    Procedure: SEPTOPLASTY, NOSE;  Surgeon: Alejandro Parkinson MD;  Location: [facility] OR;  Service: ENT;  Laterality: N/A;    RECTAL SURGERY  2012    RIGHT HEART CATHETERIZATION Right 8/27/2024    Procedure: INSERTION, CATHETER, RIGHT HEART;  Surgeon: Stanton Jefferson MD;  Location: [facility] CATH LAB;  Service: Cardiology;  Laterality: Right;    STENT, SUPERIOR MESENTERIC ARTERY Left 1/4/2024    Procedure: STENT, SUPERIOR MESENTERIC  ARTERY;  Surgeon: ALEX Alfred III, MD;  [facility];  Service: Vascular;  Laterality: Left;    TRANSURETHRAL RESECTION OF PROSTATE (TURP) WITHOUT USE OF LASER N/A 6/19/2018    Procedure: TURP, WITHOUT USING LASER;  Surgeon: Cooper Cordova IV, MD;  Location: [facility] OR;  Service: Urology;  Laterality: N/A;    TRIGGER FINGER RELEASE Left     VALVE STUDY-AORTIC  8/27/2024    Procedure: Valve study-aortic;  Surgeon: Stanton Jefferson MD;  Location: [facility] CATH LAB;  Service: Cardiology;;       Review of patient's allergies indicates:   Allergen Reactions    Lipitor [atorvastatin] Other (See Comments)     Muscle aches and pains as well as fatigue.     Niacin preparations Other (See Comments)     Causes broken blood vessels and bruises at 4 times normal dose.    Statins-hmg-coa reductase inhibitors Other (See Comments)     Statins cause intolerable myalgias.    Iodinated contrast media Hives     Tachycardia    Omeprazole Hives and Itching    Prilosec [omeprazole magnesium] Hives and Itching       Current Facility-Administered Medications on File Prior to Encounter   Medication    lactated ringers infusion     Current Outpatient Medications on File Prior to Encounter   Medication Sig    aspirin (ECOTRIN) 81 MG EC tablet Take 1 tablet (81 mg total) by mouth once daily.    budesonide-formoterol 160-4.5 mcg (SYMBICORT) 160-4.5 mcg/actuation HFAA INHALE 2 PUFFS INTO THE LUNGS TWO TIMES A DAY.    famotidine (PEPCID) 40 MG tablet Take 40 mg by mouth once daily.    finasteride (PROSCAR) 5 mg tablet TAKE 1 TABLET BY MOUTH once daily    GLUCOSAMINE HCL/CHONDR ROSARIO A NA (OSTEO BI-FLEX ORAL) Take 1 tablet by mouth 2 (two) times daily.     krill oil 500 mg Cap Take 1 capsule by mouth Daily.    lisinopriL 10 MG tablet Take 1 tablet by mouth once daily    montelukast (SINGULAIR) 10 mg tablet Take 1 tablet (10 mg total) by mouth once daily.    ACCU-CHEK GRACE PLUS METER Misc AS DIRECTED    blood sugar diagnostic  "(ACCU-CHEK GRACE PLUS TEST STRP) Strp TEST THREE TIMES A DAY    inclisiran (LEQVIO) 284 mg/1.5 mL Syrg subcutaneous injection Inject 1.5 mLs (284 mg total) into the skin every 3 (three) months.    lancets (ACCU-CHEK FASTCLIX LANCET DRUM) Misc TEST TWO TIMES A DAY    pen needle, diabetic (BD ULTRA-FINE MINI PEN NEEDLE) 31 gauge x 3/16" Ndle USE AS DIRECTED    ranolazine (RANEXA) 1,000 mg Tb12 Take 1 tablet (1,000 mg total) by mouth 2 (two) times daily.    rivaroxaban (XARELTO) 20 mg Tab Take 20 mg by mouth daily with dinner or evening meal.     Family History       Problem Relation (Age of Onset)    Heart disease Mother, Father, Brother          Tobacco Use    Smoking status: Former     Current packs/day: 0.00     Average packs/day: 0.5 packs/day for 4.0 years (2.0 ttl pk-yrs)     Types: Cigarettes     Start date: 1968     Quit date: 1972     Years since quittin.4    Smokeless tobacco: Former     Types: Chew     Quit date: 1976   Substance and Sexual Activity    Alcohol use: Not Currently    Drug use: No    Sexual activity: Yes     Partners: Female     Review of Systems   All other systems reviewed and are negative.    Objective:     Vital Signs (Most Recent):  Temp: 98.4 °F (36.9 °C) (24)  Pulse: 79 (24)  Resp: 18 (24)  BP: (!) 168/82 (24)  SpO2: 98 % (24) Vital Signs (24h Range):  Temp:  [98.4 °F (36.9 °C)] 98.4 °F (36.9 °C)  Pulse:  [79] 79  Resp:  [18] 18  SpO2:  [98 %] 98 %  BP: (139-168)/(79-82) 168/82       Weight: 102.1 kg (225 lb)  Body mass index is 35.24 kg/m².    SpO2: 98 %        Physical Exam  Vitals and nursing note reviewed.   Constitutional:       Appearance: Normal appearance.   Cardiovascular:      Rate and Rhythm: Normal rate and regular rhythm.      Pulses: Normal pulses.      Heart sounds: Normal heart sounds.   Pulmonary:      Effort: Pulmonary effort is normal.      Breath sounds: Normal breath sounds. "   Musculoskeletal:      Right lower leg: No edema.      Left lower leg: No edema.   Skin:     General: Skin is warm.   Neurological:      Mental Status: He is alert.            Significant Labs: All pertinent lab results from the last 24 hours have been reviewed.

## 2024-11-08 NOTE — Clinical Note
Quality 226: Preventive Care And Screening: Tobacco Use: Screening And Cessation Intervention: Patient screened for tobacco use and is an ex/non-smoker The catheter was inserted into the LEO. Quality 111:Pneumonia Vaccination Status For Older Adults: Pneumococcal Vaccination Previously Received Detail Level: Detailed Quality 431: Preventive Care And Screening: Unhealthy Alcohol Use - Screening: Patient not identified as an unhealthy alcohol user when screened for unhealthy alcohol use using a systematic screening method Quality 110: Preventive Care And Screening: Influenza Immunization: Influenza Immunization previously received during influenza season

## 2024-11-08 NOTE — PLAN OF CARE
Received report from DARWIN Burns. Patient s/p Watchman, AAOx3. VSS, no c/o pain or discomfort at this time, resp even and unlabored. Gauze/tegaderm dressing to R groin is CDI. No active bleeding. No hematoma noted. Post procedure protocol reviewed with patient and patient's family. Understanding verbalized. Family members at bedside. Nurse call bell within reach. Will continue to monitor per post procedure protocol.

## 2024-11-08 NOTE — Clinical Note
An angiography was performed of the LEO. The angiography was performed via hand injection with 7 mL of contrast.

## 2024-11-08 NOTE — ANESTHESIA POSTPROCEDURE EVALUATION
Anesthesia Post Evaluation    Patient: Erendira Pretty    Procedure(s) Performed: Procedure(s) (LRB):  Left atrial appendage occlusion (N/A)  ECHOCARDIOGRAM, TRANSESOPHAGEAL (N/A)    Final Anesthesia Type: general      Level of consciousness: awake and alert  Post-procedure vital signs: reviewed and stable  Pain control: Pain has been treated.  Airway patency: patent    PONV status: Absent or treated.  Anesthetic complications: no      Cardiovascular status: hemodynamically stable  Respiratory status: unassisted  Hydration status: euvolemic                Vitals Value Taken Time   /73 11/08/24 1001   Temp 36.7 °C (98 °F) 11/08/24 0917   Pulse 76 11/08/24 1001   Resp 20 11/08/24 1001   SpO2 95 % 11/08/24 1001   Vitals shown include unfiled device data.      No case tracking events are documented in the log.      Pain/Natalie Score: Pain Rating Post Med Admin: 0 (11/8/2024  9:09 AM)  Natalie Score: 10 (11/8/2024  9:45 AM)

## 2024-11-08 NOTE — Clinical Note
EP catheter removed from the left atrium. See Occupational Health Nurse/MA/Tech visit only    [facility]# [id number] FORMFOX

## 2024-11-08 NOTE — ANESTHESIA PREPROCEDURE EVALUATION
11/07/2024  Erendira Pretty is a 79 y.o., male.  Patient Active Problem List   Diagnosis    Essential hypertension    BMI 36.0-36.9,adult    PVC (premature ventricular contraction)    Acquired hypothyroidism    Coronary artery disease involving native coronary artery of native heart without angina pectoris    Dryness, eye - Both Eyes    Epiretinal membrane - Both Eyes    ED (erectile dysfunction)    Anorectal fistula    Compensated HCV cirrhosis    Benign prostatic hyperplasia with nocturia    DELONTE on CPAP    Mild intermittent asthma without complication    Pseudophakia    Colon polyps    Anal fissure    Secondary esophageal varices without bleeding    Asbestos exposure    Chest pain, moderate coronary artery risk    Nonrheumatic aortic valve stenosis    Urethral polyp    Exertional angina    Left main coronary artery disease    Mixed hyperlipidemia    S/P CABG x 2    Pain in both lower extremities    Chronic pansinusitis    Nasal polyposis    Non-seasonal allergic rhinitis due to pollen    Iron deficiency anemia due to chronic blood loss    GERD (gastroesophageal reflux disease)    HCC (hepatocellular carcinoma)    DNR (do not resuscitate)    Open angle with borderline findings and low glaucoma risk in both eyes    Aortic atherosclerosis    Type 2 diabetes mellitus with microalbuminuria, with long-term current use of insulin    Paroxysmal atrial fibrillation    Statin myopathy    Dizziness    Lower abdominal pain    Mesenteric artery stenosis    Occlusion of superior mesenteric artery    Anticoagulants causing adverse effect in therapeutic use    Class 2 severe obesity due to excess calories with serious comorbidity and body mass index (BMI) of 37.0 to 37.9 in adult    Chest pain    Left leg numbness    Episode of transient neurologic symptoms    Anemia    Coronary artery disease with exertional angina    MARCUS  (dyspnea on exertion)    Symptomatic anemia           Pre-op Assessment          Review of Systems  Cardiovascular:     Hypertension   CAD      Angina      MARCUS       Cardiovascular Symptoms: Angina   Shortness of Breath Dyspnea On Extertion   Coronary Artery Disease:                            Hypertension     Atrial Fibrillation     Pulmonary:   COPD Asthma  Shortness of breath  Sleep Apnea   Asthma:   Chronic Obstructive Pulmonary Disease (COPD):           Obstructive Sleep Apnea (DELONTE).           Hepatic/GI:     GERD Liver Disease, Hepatitis       Gerd       Liver Disease, Hepatitis        Musculoskeletal:  Arthritis        Arthritis          Neurological:    Neuromuscular Disease,           Arthritis                         Neuromuscular Disease   Endocrine:  Diabetes Hypothyroidism   Diabetes                   Hypothyroidism              Physical Exam    Airway:  No airway management difficulties anticipated  Dental:  No active dental issues noted  Chest/Lungs:  Clear to auscultation    Heart:  Rate: Normal  Rhythm: Regular Rhythm  Sounds: Normal        Anesthesia Plan  Type of Anesthesia, risks & benefits discussed:    Anesthesia Type: Gen ETT  Intra-op Monitoring Plan: Art Line  Informed Consent: Informed consent signed with the Patient and all parties understand the risks and agree with anesthesia plan.  All questions answered.   ASA Score: 3  Anesthesia Plan Notes: Chart reviewed. Patient seen and examined. Anesthesia plan discussed and questions answered. E-consent signed. Polo Pereira MD    Ready For Surgery From Anesthesia Perspective.     .

## 2024-11-08 NOTE — ASSESSMENT & PLAN NOTE
Here for LAAO with Watchman device.  We will plan to use plavix alone.    The risks, benefits and alternatives of the procedure were explained to the patient, patient's family and/or surrogate decision maker. Risks include (but not limited to) bleeding, hematoma, infection, pain, vascular damage, damage to structures surrounding the vasculature, myocardial damage [perforation, valvular damage], cardiac tamponade, device related thrombus, device emobolization potentially requiring surgical intervention, CVA, MI, and death. All questions were answered. Patient is understanding of these risks, and would like to proceed. Consents signed.

## 2024-11-08 NOTE — TRANSFER OF CARE
"Anesthesia Transfer of Care Note    Patient: Erendira Pretty    Procedure(s) Performed: Procedure(s) (LRB):  Left atrial appendage occlusion (N/A)  ECHOCARDIOGRAM, TRANSESOPHAGEAL (N/A)    Patient location: Cath Lab    Anesthesia Type: general    Transport from OR: Transported from OR on 6-10 L/min O2 by face mask with adequate spontaneous ventilation    Post pain: adequate analgesia    Post assessment: no apparent anesthetic complications    Post vital signs: stable    Level of consciousness: awake, alert and oriented    Nausea/Vomiting: no nausea/vomiting    Complications: none    Transfer of care protocol was followed      Last vitals: Visit Vitals  BP (!) 142/76   Pulse 79   Temp (P) 36.7 °C (98 °F) (Temporal)   Resp 14   Ht 5' 7" (1.702 m)   Wt 102.1 kg (225 lb)   SpO2 100%   BMI 35.24 kg/m²     "

## 2024-11-08 NOTE — HPI
79 yoM CAD/CABG here for watchman placement.      3/24: AF noted 8/23. He was stared on rythmol but no rhythm control via CV attempt was made. He remained in AF 2/7/24, SR with long first degree AVB later in February. He had a vascular intervention in his abdomen 1/24 with subsequent GI bleeding later that month. He has been off OAC since the GI bleeding event. Xarelto was eventually restarted.      5/24: GI bleeding 4/27/24 with no source identified. Off xarelto since discharge. Remains in SR on rythmol with first degree AVB. Due for PVI 6/24/24, LAAO 6w later     10/24: No sustained recurrence. LAAO scheduled 11/8/24. He is on aspirin alone.     CHADSVASC of 4  HAS BLED of 4  EF 60-65%

## 2024-11-08 NOTE — BRIEF OP NOTE
: Horacio Huerta MD  Date of procedure: 11/08/2024    Post-operative diagnosis: AF    Procedure performed: Left Atrial Appendage Occlusion with Watchman device    Description of procedure: The patient was brought to the EP lab in the fasting state. Prepped and draped in sterile fashion. Safety timeout was performed. Sedation administered by anesthesia staff. Ultrasound guided venous access of the right femoral vein was performed. 16 Fr short sheath placed in right femoral vein. Heparin bolus given. BERNARD and fluoroscopic guided single transseptal puncture performed. Watchman deployed in LEO using BERNARD and fluoroscopic guidance. Tug test confirmed stable position. BERNARD confirmed adequate compression and no significant valeria-device flow. Sheaths removed. Two silk figure of 8 sutures placed at right femoral access site for hemostasis.     EBL: <10 mL    Specimens removed: None  Complications: no immediate    The attending physician was present for entire duration of the procedure    Jacqueline Rodriguez MD, PGY8  Electrophysiology

## 2024-11-08 NOTE — DISCHARGE INSTRUCTIONS
Take plavix for 6 months after this will transition to aspirin only  You will have an ultrasound of your heart and Watchman device in approximately 6 weeks. Further recommendations will be made at that time depending on the findings  If oozing from groin site occurs, apply pressure without letting up for 15 minutes and lay flat for 1 hour. If bleeding has resolved, you can continue to monitor. If the bleeding continues or there is significant swelling or pain in the groin area, please visit the nearest ER for evaluation and treatment. DO NOT STOP TAKING YOUR BLOOD THINNER UNLESS INSTRUCTED BY A PHYSICIAN.   Do not take baths or submerge your groin area or at least 1 week or when the puncture site(s) in your groin have completely healed  Do not lift anything over 10 lbs for the first week after your procedure, and avoid strenuous activity during this time to allow for the groin sites to heal. After 1 week, you have no activity restrictions  Please contact the electrophysiology clinic or go to the ER if you experience: chest pain, shortness of breath, bleeding or swelling of the groin sites, or any other concerns.

## 2024-11-08 NOTE — DISCHARGE SUMMARY
[facility] - Cardiology  Cardiac Electrophysiology  Discharge Summary      Patient Name: Erendira Pretty  MRN: [id number]  Admission Date: 11/8/2024  Hospital Length of Stay: 0 days  Discharge Date and Time:  11/08/2024 12:15 PM  Attending Physician: Horacio Huerta MD    Discharging Provider: Jacqueline Rodriguez MD  Primary Care Physician: Bhupinder Callaway MD    HPI:   79 yoM CAD/CABG here for watchman placement.      3/24: AF noted 8/23. He was stared on rythmol but no rhythm control via CV attempt was made. He remained in AF 2/7/24, SR with long first degree AVB later in February. He had a vascular intervention in his abdomen 1/24 with subsequent GI bleeding later that month. He has been off OAC since the GI bleeding event. Xarelto was eventually restarted.      5/24: GI bleeding 4/27/24 with no source identified. Off xarelto since discharge. Remains in SR on rythmol with first degree AVB. Due for PVI 6/24/24, LAAO 6w later     10/24: No sustained recurrence. LAAO scheduled 11/8/24. He is on aspirin alone.     CHADSVASC of 4  HAS BLED of 4  EF 60-65%    Procedure(s) (LRB):  Left atrial appendage occlusion (N/A)  ECHOCARDIOGRAM, TRANSESOPHAGEAL (N/A)     Indwelling Lines/Drains at time of discharge:  Lines/Drains/Airways       None                   Hospital Course:  Patient underwent successful placement of 31mm watchman device. No complications post op. Perclose groin. Given significant history of GI bleed, instructed patient to taking plavix 75mg daily for 6 months, then will transition to aspirin 81mg daily indefinitely. Will need 45 day post watchman BERNARD.     Goals of Care Treatment Preferences:  Code Status: Full Code    Living Will: Yes              Consults:     Significant Diagnostic Studies: N/A    Pending Diagnostic Studies:       None            Final Active Diagnoses:    Diagnosis Date Noted POA    PRINCIPAL PROBLEM:  Paroxysmal atrial fibrillation [I48.0] 07/21/2021 Yes      Problems Resolved  During this Admission:     No new Assessment & Plan notes have been filed under this hospital service since the last note was generated.  Service: Arrhythmia      Discharged Condition: stable    Disposition:     Follow Up:   Follow-up Information       Horacio Huerta MD Follow up in 3 month(s).    Specialties: Electrophysiology, Cardiovascular Disease, Cardiology  Contact information:  [facility]  [facility] [address]  [phone]                           Patient Instructions:   No discharge procedures on file.  Medications:  Reconciled Home Medications:      Medication List        CONTINUE taking these medications      ACCU-CHEK GRACE PLUS METER Misc  Generic drug: blood-glucose meter  AS DIRECTED     ACCU-CHEK FASTCLIX LANCET DRUM Misc  Generic drug: lancets  TEST TWO TIMES A DAY     amLODIPine 5 MG tablet  Commonly known as: NORVASC  Take 1 tablet (5 mg total) by mouth once daily.     aspirin 81 MG EC tablet  Commonly known as: ECOTRIN  Take 1 tablet (81 mg total) by mouth once daily.     blood sugar diagnostic Strp  Commonly known as: ACCU-CHEK GRACE PLUS TEST STRP  TEST THREE TIMES A DAY     budesonide-formoterol 160-4.5 mcg 160-4.5 mcg/actuation Hfaa  Commonly known as: SYMBICORT  INHALE 2 PUFFS INTO THE LUNGS TWO TIMES A DAY.     carvediloL 3.125 MG tablet  Commonly known as: COREG  Take 1 tablet (3.125 mg total) by mouth 2 (two) times daily with meals.     cimetidine 300 MG tablet  Commonly known as: TAGAMET  Take one tablet by mouth on 11/7/2024 at 4:15 pm, one tablet on 11/7/2024 at 10:15 pm, and then one tablet on 11/8/2024 at 5:15 am for pre-procedure     clopidogreL 75 mg tablet  Commonly known as: PLAVIX  Take 1 tablet (75 mg total) by mouth once daily.     diphenhydrAMINE 50 MG capsule  Commonly known as: BENADRYL  Take one tablet by mouth on 11/7/2024 at 4:15 pm, one tablet on 11/7/2024 at 10:15 pm, and then one tablet on 11/8/2024 at 5:15 am for pre-procedure     empagliflozin 25 mg  "tablet  Commonly known as: JARDIANCE  Take 1 tablet (25 mg total) by mouth once daily.     famotidine 40 MG tablet  Commonly known as: PEPCID  Take 40 mg by mouth once daily.     finasteride 5 mg tablet  Commonly known as: PROSCAR  TAKE 1 TABLET BY MOUTH once daily     furosemide 40 MG tablet  Commonly known as: LASIX  Take 1 tablet (40 mg total) by mouth 2 (two) times daily.     * inclisiran 284 mg/1.5 mL Syrg subcutaneous injection  Commonly known as: LEQVIO  Inject 1.5 mLs (284 mg total) into the skin every 3 (three) months.     * inclisiran 284 mg/1.5 mL Syrg subcutaneous injection  Commonly known as: LEQVIO  Inject 1.5 mLs (284 mg total) into the skin every 6 (six) months.     isosorbide mononitrate 30 MG 24 hr tablet  Commonly known as: IMDUR  Take 1 tablet (30 mg total) by mouth once daily.     krill oil 500 mg Cap  Take 1 capsule by mouth Daily.     LANTUS SOLOSTAR U-100 INSULIN 100 unit/mL (3 mL) Inpn pen  Generic drug: insulin glargine U-100 (Lantus)  INJECT 44 UNITS UNDER THE SKIN EVERY EVENING     levothyroxine 175 MCG tablet  Commonly known as: SYNTHROID, LEVOTHROID  Take 2 tablets (350 mcg total) by mouth before breakfast.     lisinopriL 10 MG tablet  Take 1 tablet by mouth once daily     metFORMIN 500 MG ER 24hr tablet  Commonly known as: GLUCOPHAGE-XR  Take 1 tablet (500 mg total) by mouth 2 (two) times daily with meals.     mirabegron 50 mg Tb24  Commonly known as: MYRBETRIQ  Take 1 tablet (50 mg total) by mouth once daily.     montelukast 10 mg tablet  Commonly known as: SINGULAIR  Take 1 tablet (10 mg total) by mouth once daily.     OSTEO BI-FLEX ORAL  Take 1 tablet by mouth 2 (two) times daily.     pen needle, diabetic 31 gauge x 3/16" Ndle  Commonly known as: BD ULTRA-FINE MINI PEN NEEDLE  USE AS DIRECTED     PROVENTIL HFA 90 mcg/actuation inhaler  Generic drug: albuterol  Inhale 2 puffs into the lungs every 6 (six) hours as needed for Wheezing. Rescue     RABEprazole 20 mg tablet  Commonly " known as: ACIPHEX  Take 1 tablet (20 mg total) by mouth 2 (two) times daily.     ranolazine 1,000 mg Tb12  Commonly known as: RANEXA  Take 1 tablet (1,000 mg total) by mouth 2 (two) times daily.     REPATHA SURECLICK 140 mg/mL Pnij  Generic drug: evolocumab  INJECT 140mg subcutaneously every 14 days           * This list has 2 medication(s) that are the same as other medications prescribed for you. Read the directions carefully, and ask your doctor or other care provider to review them with you.                ASK your doctor about these medications      XARELTO 20 mg Tab  Generic drug: rivaroxaban  Take 20 mg by mouth daily with dinner or evening meal.              Time spent on the discharge of patient: 45 minutes    Jacqueline Rodriguez MD  Cardiac Electrophysiology  [facility] - Cardiology

## 2024-11-11 ENCOUNTER — LAB VISIT (OUTPATIENT)
Dept: LAB | Facility: HOSPITAL | Age: 79
End: 2024-11-11
Attending: NURSE PRACTITIONER
Payer: MEDICARE

## 2024-11-11 ENCOUNTER — TELEPHONE (OUTPATIENT)
Dept: CARDIOLOGY | Facility: CLINIC | Age: 79
End: 2024-11-11

## 2024-11-11 DIAGNOSIS — K55.21 AVM (ARTERIOVENOUS MALFORMATION) OF SMALL BOWEL, ACQUIRED WITH HEMORRHAGE: ICD-10-CM

## 2024-11-11 DIAGNOSIS — D50.9 IRON DEFICIENCY ANEMIA, UNSPECIFIED IRON DEFICIENCY ANEMIA TYPE: ICD-10-CM

## 2024-11-11 LAB
ANION GAP SERPL CALC-SCNC: 8 MMOL/L (ref 8–16)
BASOPHILS # BLD AUTO: 0.02 K/UL (ref 0–0.2)
BASOPHILS NFR BLD: 0.4 % (ref 0–1.9)
BUN SERPL-MCNC: 16 MG/DL (ref 8–23)
CALCIUM SERPL-MCNC: 8.6 MG/DL (ref 8.7–10.5)
CHLORIDE SERPL-SCNC: 108 MMOL/L (ref 95–110)
CO2 SERPL-SCNC: 25 MMOL/L (ref 23–29)
CREAT SERPL-MCNC: 1.2 MG/DL (ref 0.5–1.4)
DIFFERENTIAL METHOD BLD: ABNORMAL
EOSINOPHIL # BLD AUTO: 0.1 K/UL (ref 0–0.5)
EOSINOPHIL NFR BLD: 1.1 % (ref 0–8)
ERYTHROCYTE [DISTWIDTH] IN BLOOD BY AUTOMATED COUNT: 18.6 % (ref 11.5–14.5)
EST. GFR  (NO RACE VARIABLE): >60 ML/MIN/1.73 M^2
FERRITIN SERPL-MCNC: 51 NG/ML (ref 20–300)
GLUCOSE SERPL-MCNC: 208 MG/DL (ref 70–110)
HCT VFR BLD AUTO: 43.1 % (ref 40–54)
HGB BLD-MCNC: 14.3 G/DL (ref 14–18)
IMM GRANULOCYTES # BLD AUTO: 0.03 K/UL (ref 0–0.04)
IMM GRANULOCYTES NFR BLD AUTO: 0.5 % (ref 0–0.5)
IRON SERPL-MCNC: 55 UG/DL (ref 45–160)
LYMPHOCYTES # BLD AUTO: 1.1 K/UL (ref 1–4.8)
LYMPHOCYTES NFR BLD: 18.9 % (ref 18–48)
MCH RBC QN AUTO: 30.7 PG (ref 27–31)
MCHC RBC AUTO-ENTMCNC: 33.2 G/DL (ref 32–36)
MCV RBC AUTO: 93 FL (ref 82–98)
MONOCYTES # BLD AUTO: 0.5 K/UL (ref 0.3–1)
MONOCYTES NFR BLD: 9.4 % (ref 4–15)
NEUTROPHILS # BLD AUTO: 3.9 K/UL (ref 1.8–7.7)
NEUTROPHILS NFR BLD: 69.7 % (ref 38–73)
NRBC BLD-RTO: 0 /100 WBC
PLATELET # BLD AUTO: 155 K/UL (ref 150–450)
PMV BLD AUTO: 10.4 FL (ref 9.2–12.9)
POC ACTIVATED CLOTTING TIME K: 293 SEC (ref 74–137)
POTASSIUM SERPL-SCNC: 3.6 MMOL/L (ref 3.5–5.1)
RBC # BLD AUTO: 4.66 M/UL (ref 4.6–6.2)
SAMPLE: ABNORMAL
SATURATED IRON: 16 % (ref 20–50)
SODIUM SERPL-SCNC: 141 MMOL/L (ref 136–145)
TOTAL IRON BINDING CAPACITY: 346 UG/DL (ref 250–450)
TRANSFERRIN SERPL-MCNC: 234 MG/DL (ref 200–375)
WBC # BLD AUTO: 5.56 K/UL (ref 3.9–12.7)

## 2024-11-11 PROCEDURE — 80048 BASIC METABOLIC PNL TOTAL CA: CPT | Performed by: NURSE PRACTITIONER

## 2024-11-11 PROCEDURE — 82728 ASSAY OF FERRITIN: CPT | Performed by: NURSE PRACTITIONER

## 2024-11-11 PROCEDURE — 85025 COMPLETE CBC W/AUTO DIFF WBC: CPT | Performed by: NURSE PRACTITIONER

## 2024-11-11 PROCEDURE — 84466 ASSAY OF TRANSFERRIN: CPT | Performed by: NURSE PRACTITIONER

## 2024-11-11 PROCEDURE — 36415 COLL VENOUS BLD VENIPUNCTURE: CPT | Mod: PN | Performed by: NURSE PRACTITIONER

## 2024-11-11 NOTE — TELEPHONE ENCOUNTER
----- Message from Chente Valverde sent at 11/11/2024  2:57 PM CST -----    ----- Message -----  From: Sarah Petty  Sent: 11/11/2024   2:38 PM CST  To: [facility] Staff    Type:  Patient Returning Call    Who Called:pt  Who Left Message for Patient:pt  Does the patient know what this is regarding?:pt want to get a call from the nurse   Would the patient rather a call back or a response via [facility]? call  Best Call Back [phone]  Additional Information: call back

## 2024-11-11 NOTE — TELEPHONE ENCOUNTER
Telephoned patient and sent portal message re: scheduling BERNARD    Thomas Israel MD Domingue, Gilda C, LPN  12/20 Friday at 11 am      ----- Message -----  From: Stephani Avery LPN  Sent: 11/8/2024   4:50 PM CST  To: Thomas Israel MD  Subject: Scheduling BERNARD in 6 weeks                        Dr. Israel  Please advise BERNARD in 6 weeks around 12/20/24 ?             ----- Message from DARWIN George sent at 11/8/2024  4:18 PM CST -----  Pt had watchman placed today and will need 6 week post BERNARD for follow up in [place]  Doris Branham

## 2024-11-12 LAB
BLD PROD TYP BPU: NORMAL
BLD PROD TYP BPU: NORMAL
BLOOD UNIT EXPIRATION DATE: NORMAL
BLOOD UNIT EXPIRATION DATE: NORMAL
BLOOD UNIT TYPE CODE: 5100
BLOOD UNIT TYPE CODE: 5100
BLOOD UNIT TYPE: NORMAL
BLOOD UNIT TYPE: NORMAL
CODING SYSTEM: NORMAL
CODING SYSTEM: NORMAL
CROSSMATCH INTERPRETATION: NORMAL
CROSSMATCH INTERPRETATION: NORMAL
DISPENSE STATUS: NORMAL
DISPENSE STATUS: NORMAL
TRANS ERYTHROCYTES VOL PATIENT: NORMAL ML
TRANS ERYTHROCYTES VOL PATIENT: NORMAL ML

## 2024-11-13 ENCOUNTER — TELEPHONE (OUTPATIENT)
Dept: ELECTROPHYSIOLOGY | Facility: CLINIC | Age: 79
End: 2024-11-13
Payer: MEDICARE

## 2024-11-13 ENCOUNTER — OFFICE VISIT (OUTPATIENT)
Dept: HEMATOLOGY/ONCOLOGY | Facility: CLINIC | Age: 79
End: 2024-11-13
Payer: MEDICARE

## 2024-11-13 DIAGNOSIS — Z87.19 HISTORY OF MELENA: ICD-10-CM

## 2024-11-13 DIAGNOSIS — E61.1 IRON DEFICIENCY: Primary | ICD-10-CM

## 2024-11-13 DIAGNOSIS — B19.20 COMPENSATED HCV CIRRHOSIS: ICD-10-CM

## 2024-11-13 DIAGNOSIS — K55.21 AVM (ARTERIOVENOUS MALFORMATION) OF SMALL BOWEL, ACQUIRED WITH HEMORRHAGE: ICD-10-CM

## 2024-11-13 DIAGNOSIS — K74.69 COMPENSATED HCV CIRRHOSIS: ICD-10-CM

## 2024-11-13 NOTE — TELEPHONE ENCOUNTER
----- Message from Katia sent at 11/13/2024  9:29 AM CST -----  Regarding: Appt  Patient calling to schedule an appt for a procedure. Please call back @ [phone]. Thank you Katia

## 2024-11-13 NOTE — TELEPHONE ENCOUNTER
Recalled patient to address Jame[place] and date with Dr. Israel  Reviewed NPO status, medications and exceptions, arranging transportation home  Answered all questions and patient is ready to proceed on December 20th for 11am with 930am arrival  Was given direct call back number for any additional questions or concerns.      ----- Message from Med Assistant Valverde sent at 11/13/2024  2:27 PM CST -----  Regarding: JAMEY  Pt said that Dr. Huerta wanted to do a JAMEY on him.  Could you please call him?    Thanks,    Nicolle  ----- Message -----  From: Katia Melo  Sent: 11/13/2024   9:30 AM CST  To: [facility] Staff  Subject: Appt                                             Patient calling to schedule an appt for a procedure. Please call back @ [phone]. Thank you Katia

## 2024-11-13 NOTE — PROGRESS NOTES
Interval History:  11/13/2024 Established Patient - Audio Only Telehealth Visit     The patient location is: home  The chief complaint leading to consultation is: no complaints - post IV iron labs  Visit type: Virtual visit with audio only (telephone)  Total time spent with patient: 30       The reason for the audio only service rather than synchronous audio and video virtual visit was related to technical difficulties or patient preference/necessity.     Each patient to whom I provide medical services by telemedicine is:  (1) informed of the relationship between the physician and patient and the respective role of any other health care provider with respect to management of the patient; and (2) notified that they may decline to receive medical services by telemedicine and may withdraw from such care at any time. Patient verbally consented to receive this service via voice-only telephone call.       HPI: 78 yo male presents to the hematology clinic as a new patient for further evaluation and treatment of iron deficiency anemia.  He has been referred to the clinic by his pcp, Dr. Callaway.  He was seen many years ago in the clinic by Dr. Ramón Westbrook for SEBASTIAN and received IV iron at that time.  Is being followed by hepatology for h/o of hcv cured with cirrhosis and esophageal varices. Liver CA diagnosed x 2.   Has recently reported incident of melena which has resolved.     States that all of his anticoagulants and antiplatelets have been stopped.  Is only taking asa 81 mg PO daily.     States that he has been taking an oral iron supplement for about 15 days now.  He denies an GI upset.       Left leg numbness that started 2/26/2024 about 3 weeks after stopping antiplatelets and anticoagulation.  States it is ok in the morning but when he starts to move around the leg becomes numb.     Family hx of cancer:  Brother: lung cancer  Brother #2: skin cancer  Self: HCC     Drink a few beers a day starting last week.   Denies cigarette smoking.  Denies illicit drug use.       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     Denies f/c/ns.  Had some unintentional weight loss from December till now - about 20 lbs before stent was placed - due to abdominal pain and diarrhea.       Denies any known abnormal lymphadenopathy.       Interval History:  5/6/2024  Found with SEBASTIAN due to GI blood loss and was given 2 doses of Feraheme with last dose received on 3/26/2024.  With continued anemia - hgb 9.6 mcv 89, saturated iron 5%.  States was hospitalized last Sunday for melena x 1 episode.  EGD done 4/30/2024 - did not find H. Pylori or evidence of bleeding.  Currently being followed by GI.  States has no episodes since Sunday before last.  Only on baby asa now.     Interval History:  7/18/2024 Received Feraheme infusions x 2 with last dose received on 6/3/2024.  On 7/15/2024 discharged from hospital originally admitted for shortness of breath and pain on legs with ambulation that resolves when stopping.  Is back on Xarelto and ASA and will be having watchman procedure done soon.  Stool is dark color.  Patient states that she did receive IV push iron in the hospital.  States he is currently having issues with constipation.  Has taken slowfe in the past and was unable to tolerate.     Interval History:  9/9/2024 Found with recurrent iron deficiency anemia and given feraheme infusions x 2 with last dose received on 7/3/2024.  Presents today to evaluate response.  Ferritin has normalized; however still with saturated iron is 6%. Hgb 10.4 g/dL, mcv 91, platelet count 148.  Leukocytopenia with absolute lymphocytopenia present.  Denies f/c/ns or unintentional weight loss.  Denies any known abnormal lymphadenopathy. Has chronic left shoulder pain and received a steroid injection about 3 weeks ago.    Interval History:  11/13/2024  Found with recurrent SEBASTIAN and given 2 additional doses of IV Feraheme with last  dose received on 9/30/2024.  Presents today to evaluate response. VCS done 7/31/2024 Impression:  Erythematous gastropathy. Poor bowel preparation. No signs of active bleeding. States that he is feeling much better.  Recently had Watchman procedure done and states is feeling well.  Denies any known bleeding.  Currently not anemic with slight iron deficiency.      Assessment and plan:  Currently labs look good.  Will continue to eat high iron content foods and will f/u in 3 months with repeat labs to assess need for additional IV iron repletion.        Med Onc Chart Routing      Follow up with physician    Follow up with VASILIY 3 months. in person visit in [place]   Infusion scheduling note   n/a   Injection scheduling note n/a   Labs   Scheduling:  Preferred lab: [facility]  Lab interval:  cbc, cmp, iron studies   Imaging   N/a   Pharmacy appointment No pharmacy appointment needed      Other referrals       No additional referrals needed  n/a           This service was not originating from a related E/M service provided within the previous 7 days nor will  to an E/M service or procedure within the next 24 hours or my soonest available appointment.  Prevailing standard of care was able to be met in this audio-only visit.

## 2024-11-25 ENCOUNTER — OFFICE VISIT (OUTPATIENT)
Dept: OPHTHALMOLOGY | Facility: CLINIC | Age: 79
End: 2024-11-25
Payer: MEDICARE

## 2024-11-25 DIAGNOSIS — E11.9 CONTROLLED TYPE 2 DIABETES MELLITUS WITHOUT COMPLICATION, WITH LONG-TERM CURRENT USE OF INSULIN: Primary | ICD-10-CM

## 2024-11-25 DIAGNOSIS — H35.373 EPIRETINAL MEMBRANE (ERM) OF BOTH EYES: ICD-10-CM

## 2024-11-25 DIAGNOSIS — Z79.4 CONTROLLED TYPE 2 DIABETES MELLITUS WITHOUT COMPLICATION, WITH LONG-TERM CURRENT USE OF INSULIN: Primary | ICD-10-CM

## 2024-11-25 DIAGNOSIS — H35.363 DRUSEN OF MACULA OF BOTH EYES: ICD-10-CM

## 2024-11-25 DIAGNOSIS — Z96.1 PSEUDOPHAKIA: ICD-10-CM

## 2024-11-25 PROCEDURE — 1111F DSCHRG MED/CURRENT MED MERGE: CPT | Mod: CPTII,S$GLB,, | Performed by: OPHTHALMOLOGY

## 2024-11-25 PROCEDURE — 1157F ADVNC CARE PLAN IN RCRD: CPT | Mod: CPTII,S$GLB,, | Performed by: OPHTHALMOLOGY

## 2024-11-25 PROCEDURE — 1160F RVW MEDS BY RX/DR IN RCRD: CPT | Mod: CPTII,S$GLB,, | Performed by: OPHTHALMOLOGY

## 2024-11-25 PROCEDURE — 92134 CPTRZ OPH DX IMG PST SGM RTA: CPT | Mod: S$GLB,,, | Performed by: OPHTHALMOLOGY

## 2024-11-25 PROCEDURE — 99999 PR PBB SHADOW E&M-EST. PATIENT-LVL II: CPT | Mod: PBBFAC,,, | Performed by: OPHTHALMOLOGY

## 2024-11-25 PROCEDURE — 1159F MED LIST DOCD IN RCRD: CPT | Mod: CPTII,S$GLB,, | Performed by: OPHTHALMOLOGY

## 2024-11-25 PROCEDURE — 2023F DILAT RTA XM W/O RTNOPTHY: CPT | Mod: CPTII,S$GLB,, | Performed by: OPHTHALMOLOGY

## 2024-11-25 PROCEDURE — 1126F AMNT PAIN NOTED NONE PRSNT: CPT | Mod: CPTII,S$GLB,, | Performed by: OPHTHALMOLOGY

## 2024-11-25 PROCEDURE — 99214 OFFICE O/P EST MOD 30 MIN: CPT | Mod: S$GLB,,, | Performed by: OPHTHALMOLOGY

## 2024-11-25 RX ORDER — NEOMYCIN SULFATE, POLYMYXIN B SULFATE AND DEXAMETHASONE 3.5; 10000; 1 MG/ML; [USP'U]/ML; MG/ML
1 SUSPENSION/ DROPS OPHTHALMIC 3 TIMES DAILY
Qty: 5 ML | Refills: 0 | Status: SHIPPED | OUTPATIENT
Start: 2024-11-25 | End: 2024-12-05

## 2024-11-25 NOTE — PROGRESS NOTES
===============================  Date today is 11/25/2024  Erendira Pretty is a 79 y.o. male  Last visit [facility]: :9/25/2023   Last visit eye dept. Visit date not found    Uncorrected distance visual acuity was 20/25 in the right eye and 20/40 in the left eye.  Tonometry       Tonometry (Applanation, 8:10 AM)         Right Left    Pressure 20 20                  Not recorded       Manifest Refraction       Manifest Refraction         Sphere Cylinder Axis Dist VA    Right -0.75   20/20    Left -0.75 +0.50 180 20/25                  Not recorded       Chief Complaint   Patient presents with    Diabetes     Yearly dm exam     HPI     Diabetes            Comments: Yearly dm exam          Comments    + DIET CONTROLLED DM  MINI ERM OU  DRY EYES  ON RESTASIS  OU LL PLUGS  YAG OS3/21/2022          Last edited by Madeline Cash on 11/25/2024  7:55 AM.      Problem List Items Addressed This Visit          Eye/Vision problems    Epiretinal membrane - Both Eyes    Relevant Orders    Posterior Segment OCT Retina-Both eyes (Completed)    Pseudophakia     Other Visit Diagnoses       Controlled type 2 diabetes mellitus without complication, with long-term current use of insulin    -  Primary    Relevant Orders    Posterior Segment OCT Retina-Both eyes (Completed)    Drusen of macula of both eyes        Relevant Orders    Posterior Segment OCT Retina-Both eyes (Completed)          Instructed to call 24/7 for any worsening of vision, visual distortion or pain.  Check OU independently daily.    Gave my office and personal cell phone number.  ________________  11/25/2024 today  Erendira Pretty    DM no retinopathy  OCT stable, ERM OU unchanged  PCIOL OU post YAG OU  Tight drusen OD  Sharp disc  Macula ok  No macular degeneration  Minimal ERM OU  Allergic conjunctivitis symptoms- maxitrol drops TID OU for 10 days    RTC 1 year  Instructed to call 24/7 for any worsening of vision or symptoms. Check OU daily.   Gave my office and cell  phone number.    =============================

## 2024-12-04 ENCOUNTER — OFFICE VISIT (OUTPATIENT)
Dept: CARDIOLOGY | Facility: CLINIC | Age: 79
End: 2024-12-04
Payer: MEDICARE

## 2024-12-04 VITALS
HEIGHT: 67 IN | WEIGHT: 226.63 LBS | OXYGEN SATURATION: 98 % | BODY MASS INDEX: 35.57 KG/M2 | DIASTOLIC BLOOD PRESSURE: 72 MMHG | HEART RATE: 83 BPM | SYSTOLIC BLOOD PRESSURE: 139 MMHG

## 2024-12-04 DIAGNOSIS — Z79.4 TYPE 2 DIABETES MELLITUS WITH MICROALBUMINURIA, WITH LONG-TERM CURRENT USE OF INSULIN: Chronic | ICD-10-CM

## 2024-12-04 DIAGNOSIS — I70.0 AORTIC ATHEROSCLEROSIS: ICD-10-CM

## 2024-12-04 DIAGNOSIS — E11.29 TYPE 2 DIABETES MELLITUS WITH MICROALBUMINURIA, WITH LONG-TERM CURRENT USE OF INSULIN: Chronic | ICD-10-CM

## 2024-12-04 DIAGNOSIS — Z77.090 ASBESTOS EXPOSURE: ICD-10-CM

## 2024-12-04 DIAGNOSIS — I20.89 STABLE ANGINA PECTORIS: ICD-10-CM

## 2024-12-04 DIAGNOSIS — I25.10 CORONARY ARTERY DISEASE INVOLVING NATIVE CORONARY ARTERY OF NATIVE HEART WITHOUT ANGINA PECTORIS: Chronic | ICD-10-CM

## 2024-12-04 DIAGNOSIS — I48.0 PAROXYSMAL ATRIAL FIBRILLATION: ICD-10-CM

## 2024-12-04 DIAGNOSIS — E78.2 MIXED HYPERLIPIDEMIA: ICD-10-CM

## 2024-12-04 DIAGNOSIS — I35.0 NONRHEUMATIC AORTIC VALVE STENOSIS: ICD-10-CM

## 2024-12-04 DIAGNOSIS — R80.9 TYPE 2 DIABETES MELLITUS WITH MICROALBUMINURIA, WITH LONG-TERM CURRENT USE OF INSULIN: Chronic | ICD-10-CM

## 2024-12-04 DIAGNOSIS — T45.515S: ICD-10-CM

## 2024-12-04 DIAGNOSIS — Z95.1 S/P CABG X 2: Primary | ICD-10-CM

## 2024-12-04 DIAGNOSIS — G47.33 OSA ON CPAP: Chronic | ICD-10-CM

## 2024-12-04 DIAGNOSIS — I49.3 PVC (PREMATURE VENTRICULAR CONTRACTION): ICD-10-CM

## 2024-12-04 PROCEDURE — 99999 PR PBB SHADOW E&M-EST. PATIENT-LVL III: CPT | Mod: PBBFAC,,, | Performed by: INTERNAL MEDICINE

## 2024-12-04 RX ORDER — CLOPIDOGREL BISULFATE 75 MG/1
75 TABLET ORAL DAILY
Qty: 90 TABLET | Refills: 3 | Status: SHIPPED | OUTPATIENT
Start: 2024-12-04 | End: 2025-12-04

## 2024-12-04 RX ORDER — INSULIN GLARGINE 100 [IU]/ML
44 INJECTION, SOLUTION SUBCUTANEOUS NIGHTLY
Qty: 45 ML | Refills: 1 | Status: SHIPPED | OUTPATIENT
Start: 2024-12-04

## 2024-12-04 RX ORDER — FUROSEMIDE 40 MG/1
40 TABLET ORAL DAILY
Qty: 90 TABLET | Refills: 3 | Status: SHIPPED | OUTPATIENT
Start: 2024-12-04 | End: 2025-12-04

## 2024-12-04 NOTE — H&P (VIEW-ONLY)
Subjective:   Patient ID:  Erendira Pretty is a 79 y.o. male who presents for follow-up of aortic atherosclerosis  Pt is s/p watchman. Pt on plavix. No GI bleed since dc xarelto  Patient denies CP, angina or anginal equivalent.  Lipids are at goal.   Hypertension  This is a chronic problem. The current episode started more than 1 year ago. The problem has been gradually improving since onset. The problem is controlled. Pertinent negatives include no chest pain, palpitations or shortness of breath. Past treatments include ACE inhibitors, beta blockers and calcium channel blockers. The current treatment provides moderate improvement. There are no compliance problems.  Hypertensive end-organ damage includes CAD/MI.   Coronary Artery Disease  Presents for follow-up visit. Pertinent negatives include no chest pain, chest pressure, chest tightness, dizziness, leg swelling, muscle weakness, palpitations, shortness of breath or weight gain. The symptoms have been stable. Compliance with diet is variable. Compliance with exercise is variable. Compliance with medications is good.   Hyperlipidemia  This is a chronic problem. The current episode started more than 1 year ago. The problem is controlled. Pertinent negatives include no chest pain or shortness of breath. Treatments tried: repatha. The current treatment provides moderate improvement of lipids. There are no compliance problems.        Review of Systems   Constitutional: Negative. Negative for weight gain.   HENT: Negative.     Eyes: Negative.    Cardiovascular: Negative.  Negative for chest pain, leg swelling and palpitations.   Respiratory: Negative.  Negative for chest tightness and shortness of breath.    Endocrine: Negative.    Hematologic/Lymphatic: Negative.    Skin: Negative.    Musculoskeletal:  Negative for muscle weakness.   Gastrointestinal: Negative.    Genitourinary: Negative.    Neurological: Negative.  Negative for dizziness.   Psychiatric/Behavioral:  Negative.     Allergic/Immunologic: Negative.    All other systems reviewed and are negative.    Family History   Problem Relation Name Age of Onset    Heart disease Mother      Heart disease Father      Heart disease Brother      Colon cancer Neg Hx       Past Medical History:   Diagnosis Date    Anticoagulant long-term use     Aortic stenosis     Asthma     Benign prostatic hyperplasia with nocturia     Bilateral carotid artery stenosis 2021    US CAROTID BILATERAL 2021 IMPRESSION: Atherosclerosis with suspected stenosis greater than 50% at the right proximal ICA visually. Velocities are discordant and appear improved from prior. Atherosclerosis on the left with no evidence of flow-limiting stenosis greater than 50%.  FINDINGS: Right: Internal Carotid Artery (ICA): Peak systolic velocity 118 cm/sec. End diastolic velocity 35 cm/sec    BPH (benign prostatic hyperplasia)     CAD (coronary artery disease)     40% lesion ;lazo    Cirrhosis     Claudication 2014    Colon polyp     COPD (chronic obstructive pulmonary disease)     ED (erectile dysfunction)     Encounter for blood transfusion     Ex-smoker     Hearing loss NEC     Hepatitis C     Cured;dr gibbs harvoni     Hyperlipidemia     Hypertension     Hypothyroid     s/p tx graves    Open angle with borderline findings and low glaucoma risk in both eyes 2020    DELONTE on CPAP     Osteoarthritis     Paroxysmal atrial fibrillation     PVD (peripheral vascular disease)     Secondary esophageal varices without bleeding 2017    Type 2 diabetes mellitus      Social History     Socioeconomic History    Marital status:      Spouse name: YAEL    Number of children: 2   Tobacco Use    Smoking status: Former     Current packs/day: 0.00     Average packs/day: 0.5 packs/day for 4.0 years (2.0 ttl pk-yrs)     Types: Cigarettes     Start date: 1968     Quit date: 1972     Years since quittin.5    Smokeless tobacco:  Former     Types: Chew     Quit date: 11/18/1976   Substance and Sexual Activity    Alcohol use: Not Currently    Drug use: No    Sexual activity: Yes     Partners: Female   Social History Narrative    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    No pets or smokers in household, lives alone.     Current Outpatient Medications on File Prior to Visit   Medication Sig Dispense Refill    ACCU-CHEK GRACE PLUS METER Misc AS DIRECTED 1 each 0    albuterol (PROVENTIL HFA) 90 mcg/actuation inhaler Inhale 2 puffs into the lungs every 6 (six) hours as needed for Wheezing. Rescue      amLODIPine (NORVASC) 5 MG tablet Take 1 tablet (5 mg total) by mouth once daily. 90 tablet 3    blood sugar diagnostic (ACCU-CHEK GRACE PLUS TEST STRP) Strp TEST THREE TIMES A  strip 11    budesonide-formoterol 160-4.5 mcg (SYMBICORT) 160-4.5 mcg/actuation HFAA INHALE 2 PUFFS INTO THE LUNGS TWO TIMES A DAY. 10.2 g 11    carvediloL (COREG) 3.125 MG tablet Take 1 tablet (3.125 mg total) by mouth 2 (two) times daily with meals. 60 tablet 6    cimetidine (TAGAMET) 300 MG tablet Take one tablet by mouth on 11/7/2024 at 4:15 pm, one tablet on 11/7/2024 at 10:15 pm, and then one tablet on 11/8/2024 at 5:15 am for pre-procedure 3 tablet 0    clopidogreL (PLAVIX) 75 mg tablet Take 1 tablet (75 mg total) by mouth once daily. 30 tablet 11    diphenhydrAMINE (BENADRYL) 50 MG capsule Take one tablet by mouth on 11/7/2024 at 4:15 pm, one tablet on 11/7/2024 at 10:15 pm, and then one tablet on 11/8/2024 at 5:15 am for pre-procedure 3 capsule 0    empagliflozin (JARDIANCE) 25 mg tablet Take 1 tablet (25 mg total) by mouth once daily. 90 tablet 3    famotidine (PEPCID) 40 MG tablet Take 40 mg by mouth once daily.      finasteride (PROSCAR) 5 mg tablet TAKE 1 TABLET BY MOUTH once daily 90 tablet 3    furosemide (LASIX) 40 MG tablet Take 1 tablet (40 mg total) by mouth 2 (two) times daily. 60 tablet  "11    GLUCOSAMINE HCL/CHONDR ROSARIO A NA (OSTEO BI-FLEX ORAL) Take 1 tablet by mouth 2 (two) times daily.       inclisiran (LEQVIO) 284 mg/1.5 mL Syrg subcutaneous injection Inject 1.5 mLs (284 mg total) into the skin every 3 (three) months. 1.5 mL 1    inclisiran (LEQVIO) 284 mg/1.5 mL Syrg subcutaneous injection Inject 1.5 mLs (284 mg total) into the skin every 6 (six) months. 1.5 mL 1    krill oil 500 mg Cap Take 1 capsule by mouth Daily.      lancets (ACCU-CHEK FASTCLIX LANCET DRUM) Misc TEST TWO TIMES A  each 5    LANTUS SOLOSTAR U-100 INSULIN 100 unit/mL (3 mL) InPn pen INJECT 44 UNITS UNDER THE SKIN EVERY EVENING 45 mL 0    levothyroxine (SYNTHROID, LEVOTHROID) 175 MCG tablet Take 2 tablets (350 mcg total) by mouth before breakfast. 180 tablet 1    lisinopriL 10 MG tablet Take 1 tablet by mouth once daily 90 tablet 3    metFORMIN (GLUCOPHAGE-XR) 500 MG ER 24hr tablet Take 1 tablet (500 mg total) by mouth 2 (two) times daily with meals. 180 tablet 3    mirabegron (MYRBETRIQ) 50 mg Tb24 Take 1 tablet (50 mg total) by mouth once daily. 30 tablet 5    montelukast (SINGULAIR) 10 mg tablet Take 1 tablet (10 mg total) by mouth once daily. 90 tablet 3    neomycin-polymyxin-dexamethasone (MAXITROL) 3.5mg/mL-10,000 unit/mL-0.1 % DrpS Place 1 drop into both eyes 3 (three) times daily. for 10 days 5 mL 0    pen needle, diabetic (BD ULTRA-FINE MINI PEN NEEDLE) 31 gauge x 3/16" Ndle USE AS DIRECTED 100 each 11    RABEprazole (ACIPHEX) 20 mg tablet Take 1 tablet (20 mg total) by mouth 2 (two) times daily. 180 tablet 3    ranolazine (RANEXA) 1,000 mg Tb12 Take 1 tablet (1,000 mg total) by mouth 2 (two) times daily. 60 tablet 2    REPATHA SURECLICK 140 mg/mL PnIj INJECT 140mg subcutaneously every 14 days 2 mL 11    isosorbide mononitrate (IMDUR) 30 MG 24 hr tablet Take 1 tablet (30 mg total) by mouth once daily. 30 tablet 0     Current Facility-Administered Medications on File Prior to Visit   Medication Dose Route " Frequency Provider Last Rate Last Admin    lactated ringers infusion   Intravenous Continuous Omaira Theodore MD   New Bag at 09/09/19 1117     Review of patient's allergies indicates:   Allergen Reactions    Lipitor [atorvastatin] Other (See Comments)     Muscle aches and pains as well as fatigue.     Niacin preparations Other (See Comments)     Causes broken blood vessels and bruises at 4 times normal dose.    Statins-hmg-coa reductase inhibitors Other (See Comments)     Statins cause intolerable myalgias.    Iodinated contrast media Hives     Tachycardia    Omeprazole Hives and Itching    Prilosec [omeprazole magnesium] Hives and Itching       Objective:     Physical Exam  Vitals and nursing note reviewed.   Constitutional:       Appearance: He is well-developed.   HENT:      Head: Normocephalic and atraumatic.   Eyes:      Conjunctiva/sclera: Conjunctivae normal.      Pupils: Pupils are equal, round, and reactive to light.   Cardiovascular:      Rate and Rhythm: Normal rate and regular rhythm.      Pulses: Intact distal pulses.      Heart sounds: Normal heart sounds.   Pulmonary:      Effort: Pulmonary effort is normal.      Breath sounds: Normal breath sounds.   Abdominal:      General: Bowel sounds are normal.      Palpations: Abdomen is soft.   Musculoskeletal:      Cervical back: Normal range of motion and neck supple.   Skin:     General: Skin is warm and dry.   Neurological:      Mental Status: He is alert and oriented to person, place, and time.         Assessment:     1. S/P CABG x 2    2. PVC (premature ventricular contraction)    3. Paroxysmal atrial fibrillation    4. Nonrheumatic aortic valve stenosis    5. Mixed hyperlipidemia    6. Coronary artery disease involving native coronary artery of native heart without angina pectoris    7. Stable angina pectoris    8. Aortic atherosclerosis    9. DELONTE on CPAP    10. Asbestos exposure    11. Anticoagulant causing adverse effect in therapeutic use,  sequela        Plan:     S/P CABG x 2    PVC (premature ventricular contraction)    Paroxysmal atrial fibrillation    Nonrheumatic aortic valve stenosis    Mixed hyperlipidemia    Coronary artery disease involving native coronary artery of native heart without angina pectoris    Stable angina pectoris    Aortic atherosclerosis    DELONTE on CPAP    Asbestos exposure    Anticoagulant causing adverse effect in therapeutic use, sequela      Continue lisinopril , norvasc , coreg- HTN/PAF  continue repatha - HLP

## 2024-12-04 NOTE — TELEPHONE ENCOUNTER
No care due was identified.  [facility] Embedded Care Due Messages. Reference number: [id number].   12/04/2024 8:31:12 AM CST

## 2024-12-05 NOTE — TELEPHONE ENCOUNTER
Refill Decision Note   Erendira Pretty  is requesting a refill authorization.  Brief Assessment and Rationale for Refill:  Approve     Medication Therapy Plan:        Comments:     Note composed:6:03 PM 12/04/2024

## 2024-12-20 ENCOUNTER — HOSPITAL ENCOUNTER (OUTPATIENT)
Facility: HOSPITAL | Age: 79
Discharge: HOME | End: 2024-12-20
Attending: INTERNAL MEDICINE | Admitting: INTERNAL MEDICINE
Payer: MEDICARE

## 2024-12-20 ENCOUNTER — ANESTHESIA EVENT (OUTPATIENT)
Dept: CARDIOLOGY | Facility: HOSPITAL | Age: 79
End: 2024-12-20
Payer: MEDICARE

## 2024-12-20 ENCOUNTER — ANESTHESIA (OUTPATIENT)
Dept: CARDIOLOGY | Facility: HOSPITAL | Age: 79
End: 2024-12-20
Payer: MEDICARE

## 2024-12-20 DIAGNOSIS — Z95.818 PRESENCE OF WATCHMAN LEFT ATRIAL APPENDAGE CLOSURE DEVICE: Primary | ICD-10-CM

## 2024-12-20 LAB
ANION GAP SERPL CALC-SCNC: 7 MMOL/L (ref 8–16)
AV INDEX (PROSTH): 0.32
AV PEAK GRADIENT: 36 MMHG
AV VALVE AREA: 1.1 CM²
BSA FOR ECHO PROCEDURE: 2.2 M2
BUN SERPL-MCNC: 16 MG/DL (ref 8–23)
CALCIUM SERPL-MCNC: 9 MG/DL (ref 8.7–10.5)
CHLORIDE SERPL-SCNC: 111 MMOL/L (ref 95–110)
CO2 SERPL-SCNC: 25 MMOL/L (ref 23–29)
CREAT SERPL-MCNC: 1 MG/DL (ref 0.5–1.4)
DOP CALC AO PEAK VEL: 3 M/S
DOP CALC AO VTI: 66 CM
DOP CALC LVOT AREA: 3.5 CM2
DOP CALC LVOT DIAMETER: 2.1 CM
DOP CALC LVOT STROKE VOLUME: 72.7 CM3
DOP CALCLVOT PEAK VEL VTI: 21 CM
ERYTHROCYTE [DISTWIDTH] IN BLOOD BY AUTOMATED COUNT: 15.1 % (ref 11.5–14.5)
EST. GFR  (NO RACE VARIABLE): >60 ML/MIN/1.73 M^2
GLUCOSE SERPL-MCNC: 89 MG/DL (ref 70–110)
HCT VFR BLD AUTO: 40.8 % (ref 40–54)
HGB BLD-MCNC: 13.3 G/DL (ref 14–18)
MCH RBC QN AUTO: 30.6 PG (ref 27–31)
MCHC RBC AUTO-ENTMCNC: 32.6 G/DL (ref 32–36)
MCV RBC AUTO: 94 FL (ref 82–98)
PLATELET # BLD AUTO: 159 K/UL (ref 150–450)
PMV BLD AUTO: 9 FL (ref 9.2–12.9)
POTASSIUM SERPL-SCNC: 4.3 MMOL/L (ref 3.5–5.1)
RA PRESSURE ESTIMATED: 3 MMHG
RBC # BLD AUTO: 4.34 M/UL (ref 4.6–6.2)
SODIUM SERPL-SCNC: 143 MMOL/L (ref 136–145)
WBC # BLD AUTO: 4.33 K/UL (ref 3.9–12.7)

## 2024-12-20 PROCEDURE — 37000008 HC ANESTHESIA 1ST 15 MINUTES: Performed by: INTERNAL MEDICINE

## 2024-12-20 PROCEDURE — 80048 BASIC METABOLIC PNL TOTAL CA: CPT | Performed by: INTERNAL MEDICINE

## 2024-12-20 PROCEDURE — 85027 COMPLETE CBC AUTOMATED: CPT | Performed by: INTERNAL MEDICINE

## 2024-12-20 PROCEDURE — 63600175 PHARM REV CODE 636 W HCPCS: Performed by: NURSE ANESTHETIST, CERTIFIED REGISTERED

## 2024-12-20 RX ORDER — PROPOFOL 10 MG/ML
VIAL (ML) INTRAVENOUS
Status: DISCONTINUED | OUTPATIENT
Start: 2024-12-20 | End: 2024-12-20

## 2024-12-20 RX ORDER — LIDOCAINE HYDROCHLORIDE 20 MG/ML
INJECTION INTRAVENOUS
Status: DISCONTINUED | OUTPATIENT
Start: 2024-12-20 | End: 2024-12-20

## 2024-12-20 RX ORDER — SODIUM CHLORIDE 0.9 % (FLUSH) 0.9 %
10 SYRINGE (ML) INJECTION
Status: DISCONTINUED | OUTPATIENT
Start: 2024-12-20 | End: 2024-12-20 | Stop reason: HOSPADM

## 2024-12-20 RX ORDER — SODIUM CHLORIDE 9 MG/ML
INJECTION, SOLUTION INTRAVENOUS ONCE
Status: DISCONTINUED | OUTPATIENT
Start: 2024-12-20 | End: 2024-12-20 | Stop reason: HOSPADM

## 2024-12-20 RX ADMIN — PROPOFOL 20 MG: 10 INJECTION, EMULSION INTRAVENOUS at 11:12

## 2024-12-20 RX ADMIN — PROPOFOL 100 MG: 10 INJECTION, EMULSION INTRAVENOUS at 11:12

## 2024-12-20 RX ADMIN — PROPOFOL 30 MG: 10 INJECTION, EMULSION INTRAVENOUS at 11:12

## 2024-12-20 RX ADMIN — LIDOCAINE HYDROCHLORIDE 50 MG: 20 INJECTION INTRAVENOUS at 11:12

## 2024-12-20 NOTE — DISCHARGE INSTRUCTIONS
[facility] DISCHARGE INSTRUCTIONS:     ACTIVITY/SAFETY  Because of the aftereffect of the sedation you received, we advise you to refrain from the following activities for 24 hours.  1. Do not drive or operate machinery.  2. Do not operate appliances if alone. (stove, iron, lawnmower)  3. Do not sign legal papers or make important decisions.    Have standby assistance on stairways.  It is advised that you have someone stay with you for at least 8 hours after procedure    Comfort:  If you develop a sore throat gargle with warm salt water (1/2 tsp.of salt in 8oz of warm water) or use throat lozenges.     Some of the sedatives you received today may make you nauseated.  Begin with clear liquids and then progress to solid foods as tolerated.    CALL THE DOCTOR FOR:  SEVERE THROAT PAIN / DIFFICULTY SWALLOWING                                                  SEVERE ABDOMINAL OR CHEST PAIN                                                  VOMITING BLOOD.

## 2024-12-20 NOTE — TRANSFER OF CARE
"Anesthesia Transfer of Care Note    Patient: Erendira Pretty    Procedure(s) Performed: Procedure(s) (LRB):  ECHOCARDIOGRAM, TRANSESOPHAGEAL (N/A)    Patient location: Other: cvru    Anesthesia Type: MAC    Transport from OR: Transported from OR on room air with adequate spontaneous ventilation    Post pain: adequate analgesia    Post assessment: no apparent anesthetic complications and tolerated procedure well    Post vital signs: stable    Level of consciousness: sedated    Nausea/Vomiting: no nausea/vomiting    Complications: none    Transfer of care protocol was followed    Last vitals: Visit Vitals  BP (!) 142/80 (BP Location: Left arm, Patient Position: Lying)   Pulse 69   Temp 36.8 °C (98.2 °F) (Temporal)   Resp 18   Ht 5' 7" (1.702 m)   Wt 102.5 kg (226 lb)   SpO2 100%   BMI 35.40 kg/m²     "

## 2024-12-20 NOTE — INTERVAL H&P NOTE
The patient has been examined and the H&P has been reviewed:    I concur with the findings and no changes have occurred since H&P was written.    Anesthesia risks, benefits and alternative options discussed and understood by patient/family.    BERNARD as scheduled for 6 weeks post Watchmen implantation  I have explained the risks, benefits, and alternatives of the procedure in detail with patient and family. The patient voices understanding and all questions have been addressed.  The patient agrees to proceed as planned.        There are no hospital problems to display for this patient.

## 2024-12-20 NOTE — PLAN OF CARE
Left AC saline lock discontinued with canula intact; tolerated well.  Pt able to drink fluids while in CVRU without issue; remains AAOx3 at time of discharge.   Pt ambulated at baseline prior to discharge.   Discharge instructions, home medication list, and post discharge follow up appointments discussed with pt and son.  Discharged to son, via wheelchair, in no apparent distress. All personal belongings taken with pt.   Encouraged continued compliance with MD directives.

## 2024-12-20 NOTE — OP NOTE
[facility] - Cath Lab ([facility])  Brief Operative Note    Surgery Date: 12/20/2024     Surgeons and Role:     * Thomas Israel MD - Primary    Assisting Surgeon: None    Pre-op Diagnosis:  Presence of Watchman left atrial appendage closure device [Z95.818]    Post-op Diagnosis:  Post-Op Diagnosis Codes:     * Presence of Watchman left atrial appendage closure device [Z95.818]    Procedure(s) (LRB):  ECHOCARDIOGRAM, TRANSESOPHAGEAL (N/A)    Anesthesia: Monitor Anesthesia Care    Operative Findings:   Procedure Description: The risks, benefits, and alternatives of the procedure expalined in detail with patient and family. The patient voices understanding and all questions have been addressed. The patient agrees to proceed as planned.   The patient was sedated by anesthesiology service. The probe was advanced via throat into esophagus smoothly. The patient tolerated the procedure well and there was no complications. The probed was withdrawal at the end of study. The patient was hemodynamically stable.   Estimated Blood Loss: none.   Findings / Operative Note:   S/p watchmen LAAC. Functioning well and no para device leaking  Severe AS   EF 55%   MV calcification and MAC  The EP service is notified    Ok to discharge to home once hemodynamically stable.  F/U with primary cardiologist as scheduled at cardiology clinic.  DIET dash  ACTIVITY as tolerated      Estimated Blood Loss: * No values recorded between 12/20/2024 11:01 AM and 12/20/2024 11:14 AM *         Specimens:   Specimen (24h ago, onward)      None              Discharge Note    OUTCOME: Patient tolerated treatment/procedure well without complication and is now ready for discharge.    DISPOSITION: Home or Self Care    FINAL DIAGNOSIS:  <principal problem not specified>    FOLLOWUP: In clinic    DISCHARGE INSTRUCTIONS:  No discharge procedures on file.

## 2024-12-20 NOTE — DISCHARGE SUMMARY
[facility] - Cath Lab (Hospital)  Discharge Note  Short Stay    Procedure(s) (LRB):  ECHOCARDIOGRAM, TRANSESOPHAGEAL (N/A)      OUTCOME: Patient tolerated treatment/procedure well without complication and is now ready for discharge.    DISPOSITION: Home or Self Care    FINAL DIAGNOSIS:  <principal problem not specified>    FOLLOWUP: In clinic    DISCHARGE INSTRUCTIONS:  No discharge procedures on file.     TIME SPENT ON DISCHARGE: 15 minutes

## 2024-12-20 NOTE — ANESTHESIA PREPROCEDURE EVALUATION
12/20/2024  Erendira Pretty is a 79 y.o., male.      Pre-op Assessment    I have reviewed the Patient Summary Reports.     I have reviewed the Nursing Notes. I have reviewed the NPO Status.   I have reviewed the Medications.     Review of Systems  Cardiovascular:     Hypertension   CAD      Angina      MARCUS       Cardiovascular Symptoms: Angina   Shortness of Breath Dyspnea On Extertion   Coronary Artery Disease:                            Hypertension         Pulmonary:   COPD Asthma  Shortness of breath  Sleep Apnea   Asthma:   Chronic Obstructive Pulmonary Disease (COPD):           Obstructive Sleep Apnea (DELONTE).           Hepatic/GI:     GERD Liver Disease, Hepatitis           Liver Disease, Hepatitis        Musculoskeletal:  Arthritis               Neurological:    Neuromuscular Disease,                                 Neuromuscular Disease   Endocrine:  Diabetes Hypothyroidism   Diabetes                          Physical Exam    Airway:  No airway management difficulties anticipated  Dental:  No active dental issues noted      Anesthesia Plan  Type of Anesthesia, risks & benefits discussed:    Anesthesia Type: MAC  Intra-op Monitoring Plan: Standard ASA Monitors  Induction:  IV  Informed Consent: Informed consent signed with the Patient and all parties understand the risks and agree with anesthesia plan.  All questions answered.   ASA Score: 3  Day of Surgery Review of History & Physical: I have interviewed and examined the patient. I have reviewed the patient's H&P dated:     Ready For Surgery From Anesthesia Perspective.     .

## 2024-12-20 NOTE — ANESTHESIA POSTPROCEDURE EVALUATION
Anesthesia Post Evaluation    Patient: Erendira Pretty    Procedure(s) Performed: Procedure(s) (LRB):  ECHOCARDIOGRAM, TRANSESOPHAGEAL (N/A)    Final Anesthesia Type: MAC      Patient location: [facility].  Patient participation: Yes- Able to Participate  Level of consciousness: sedated  Post-procedure vital signs: reviewed and stable  Pain management: adequate  Airway patency: patent    PONV status at discharge: No PONV  Anesthetic complications: no      Cardiovascular status: blood pressure returned to baseline and hemodynamically stable  Respiratory status: unassisted, spontaneous ventilation and nasal cannula  Hydration status: euvolemic  Follow-up not needed.          Vitals Value Taken Time   /80 12/20/24 0927   Temp 36.8 °C (98.2 °F) 12/20/24 0927   Pulse 69 12/20/24 0927   Resp 18 12/20/24 0927   SpO2 100 % 12/20/24 0927         No case tracking events are documented in the log.      Pain/Natalie Score: No data recorded

## 2024-12-26 VITALS
WEIGHT: 225 LBS | HEIGHT: 67 IN | RESPIRATION RATE: 17 BRPM | BODY MASS INDEX: 35.31 KG/M2 | HEART RATE: 66 BPM | TEMPERATURE: 98 F | DIASTOLIC BLOOD PRESSURE: 83 MMHG | OXYGEN SATURATION: 98 % | SYSTOLIC BLOOD PRESSURE: 151 MMHG

## 2024-12-27 RX ORDER — CARVEDILOL 3.12 MG/1
3.12 TABLET ORAL 2 TIMES DAILY WITH MEALS
Qty: 60 TABLET | Refills: 6 | Status: SHIPPED | OUTPATIENT
Start: 2024-12-27

## 2025-01-08 ENCOUNTER — OFFICE VISIT (OUTPATIENT)
Dept: CARDIOLOGY | Facility: CLINIC | Age: 80
End: 2025-01-08
Payer: MEDICARE

## 2025-01-08 VITALS
SYSTOLIC BLOOD PRESSURE: 122 MMHG | HEIGHT: 67 IN | HEART RATE: 80 BPM | BODY MASS INDEX: 36.72 KG/M2 | WEIGHT: 233.94 LBS | DIASTOLIC BLOOD PRESSURE: 68 MMHG

## 2025-01-08 DIAGNOSIS — E66.01 CLASS 2 SEVERE OBESITY DUE TO EXCESS CALORIES WITH SERIOUS COMORBIDITY AND BODY MASS INDEX (BMI) OF 37.0 TO 37.9 IN ADULT: ICD-10-CM

## 2025-01-08 DIAGNOSIS — E66.812 CLASS 2 SEVERE OBESITY DUE TO EXCESS CALORIES WITH SERIOUS COMORBIDITY AND BODY MASS INDEX (BMI) OF 37.0 TO 37.9 IN ADULT: ICD-10-CM

## 2025-01-08 DIAGNOSIS — I48.0 PAROXYSMAL ATRIAL FIBRILLATION: ICD-10-CM

## 2025-01-08 DIAGNOSIS — I35.0 NONRHEUMATIC AORTIC VALVE STENOSIS: ICD-10-CM

## 2025-01-08 DIAGNOSIS — I25.10 CORONARY ARTERY DISEASE INVOLVING NATIVE CORONARY ARTERY OF NATIVE HEART WITHOUT ANGINA PECTORIS: Primary | Chronic | ICD-10-CM

## 2025-01-08 PROCEDURE — 99999 PR PBB SHADOW E&M-EST. PATIENT-LVL IV: CPT | Mod: PBBFAC,,, | Performed by: INTERNAL MEDICINE

## 2025-01-08 RX ORDER — SILDENAFIL 100 MG/1
100 TABLET, FILM COATED ORAL DAILY PRN
COMMUNITY

## 2025-01-08 RX ORDER — MULTIVITAMIN
1 TABLET ORAL DAILY
COMMUNITY

## 2025-01-08 NOTE — ASSESSMENT & PLAN NOTE
CTS Progress Note       LOS: 3 days   Patient Care Team:  Sheila Sheikh APRN as PCP - General (Nurse Practitioner)    Chief Complaint: Right leg swelling    Vital Signs:  Temp:  [97.5 °F (36.4 °C)-98.5 °F (36.9 °C)] 98.5 °F (36.9 °C)  Heart Rate:  [61-82] 75  Resp:  [18-20] 18  BP: (145-171)/(56-71) 155/66    Physical Exam: There is still some erythema on the right mid thigh.  Some serous type drainage from the knee incision.  Foot is warm and viable with Doppler signal in the vein graft     Results:   Results from last 7 days   Lab Units 02/08/21  0404   WBC 10*3/mm3 6.68   HEMOGLOBIN g/dL 11.1*   HEMATOCRIT % 34.3*   PLATELETS 10*3/mm3 195     Results from last 7 days   Lab Units 02/08/21  0404   SODIUM mmol/L 132*   POTASSIUM mmol/L 4.5   CHLORIDE mmol/L 99   CO2 mmol/L 28.0   BUN mg/dL 8   CREATININE mg/dL 0.64*   GLUCOSE mg/dL 188*   CALCIUM mg/dL 8.9           Imaging Results (Last 24 Hours)     ** No results found for the last 24 hours. **          Assessment      Tobacco use    PVD     S/P L leg amputation     Post-operative infection    Leukocytosis has resolved.  However we are still suspicious of a possible infected fluid collection along the vein graft.  I discussed possible open surgical drainage with the patient and he is agreeable.  This will be scheduled for tomorrow.  He understands he will have an incision will almost certainly be left open for continued drainage.  And may require partial coverage later to cover the vein graft.    Plan   N.p.o. after midnight.  Type and cross for 2 units.  Preop for incision and drainage of right leg    Please note that portions of this note were completed with a voice recognition program. Efforts were made to edit the dictations, but occasionally words are mistranscribed.    Jeremy Trevizo MD  02/08/21  06:51 EST       S/P CABG. Reviewed angiogram films. Continue medical Rx.

## 2025-01-08 NOTE — PROGRESS NOTES
INTERVENTIONAL CARDIOLOGY CLINIC  [facility]    REFERRING PHYSICIAN: Ronny    REASON FOR VISIT:  Severe AS.    HISTORY OF PRESENT ILLNESS  Erendira Pretty is a 79 y.o. male referred by Dr. Jefferson for evaluation of aortic stenosis.    The patient has a history of CAD s/p CABG, HFpEF, PAF s/p LAAAO, bilateral carotid stenosis, claudication. He had a couple of episodes of shortness of breath which triggered workup that included cardiac catheterization and echo. He was found to have patient grafts and small vessel CAD, moderate to severe AS. He was referred to structural clinic.    For the past 2-3 months he reports no dyspnea, he is able to do all he wants to do without limitation.     PAST MEDICAL HISTORY  Past Medical History:   Diagnosis Date    Anticoagulant long-term use     Aortic stenosis     Asthma     Benign prostatic hyperplasia with nocturia     Bilateral carotid artery stenosis 07/21/2021    US CAROTID BILATERAL 07/14/2021 IMPRESSION: Atherosclerosis with suspected stenosis greater than 50% at the right proximal ICA visually. Velocities are discordant and appear improved from prior. Atherosclerosis on the left with no evidence of flow-limiting stenosis greater than 50%.  FINDINGS: Right: Internal Carotid Artery (ICA): Peak systolic velocity 118 cm/sec. 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 09/22/2020    DELONTE on CPAP     Osteoarthritis     Paroxysmal atrial fibrillation     PVD (peripheral vascular disease)      Secondary esophageal varices without bleeding 06/26/2017    Type 2 diabetes mellitus         PAST SURGICAL HISTORY  Past Surgical History:   Procedure Laterality Date    ABLATION OF ARRHYTHMOGENIC FOCUS FOR ATRIAL FIBRILLATION N/A 6/24/2024    Procedure: Ablation atrial fibrillation;  Surgeon: Horacio Huerta MD;  Location: [facility] EP LAB;  Service: Cardiology;  Laterality: N/A;  afib, PVI, RFA, BERNARD (cx if SR), ZIA, anes, MB, 3 Prep, 3 hours per Dr. Huerta    ANGIOGRAM, CORONARY, WITH LEFT HEART CATHETERIZATION N/A 8/27/2024    Procedure: Angiogram, Coronary, with Left Heart Cath;  Surgeon: Stanton Jefferson MD;  Location: [facility] CATH LAB;  Service: Cardiology;  Laterality: N/A;    ANGIOGRAM, EXTREMITY, UNILATERAL Left 1/4/2024    Procedure: ANGIOGRAM, EXTREMITY, UNILATERAL- Femoral / SMS Stent, Poss General;  Surgeon: ALEX Alfred III, MD;  Location: [facility] OR [address] [place] [address];  Service: Vascular;  Laterality: Left;  mGy : 2698.78  Gy.cm : 229.88  Contrast : 62ml  Fluro time : 13.8min    ARTERIOGRAPHY OF SUBCLAVIAN ARTERY  8/27/2024    Procedure: ARTERIOGRAM, SUBCLAVIAN;  Surgeon: Stanton Jefferson MD;  Location: [facility] CATH LAB;  Service: Cardiology;;    CARDIAC CATHETERIZATION      CARDIAC SURGERY      CARPAL TUNNEL RELEASE Left     CATARACT EXTRACTION      CATARACT EXTRACTION W/ INTRAOCULAR LENS  IMPLANT, BILATERAL  2008    CATHETERIZATION OF BOTH LEFT AND RIGHT HEART N/A 11/2/2018    Procedure: CATHETERIZATION, HEART, BOTH LEFT AND RIGHT;  Surgeon: Frank Gallardo MD;  Location: [facility] CATH LAB;  Service: Cardiology;  Laterality: N/A;    COLONOSCOPY  8/2013    COLONOSCOPY N/A 5/30/2016    Procedure: COLONOSCOPY;  Surgeon: Anna Tomas MD;  Location: [facility] ENDO;  Service: Endoscopy;  Laterality: N/A;    COLONOSCOPY N/A 7/17/2019    Procedure: COLONOSCOPY;  Surgeon: David Carter III, MD;  Location: [facility] ENDO;  Service: Endoscopy;  Laterality: N/A;    COLONOSCOPY N/A 12/14/2023    Procedure: COLONOSCOPY;  Surgeon:  Ama Barrera MD;  Location: [facility] ENDO;  Service: Endoscopy;  Laterality: N/A;    COLONOSCOPY N/A 7/27/2024    Procedure: COLONOSCOPY;  Surgeon: Ama Barrera MD;  Location: [facility] ENDO;  Service: Endoscopy;  Laterality: N/A;    COMPUTED TOMOGRAPHY N/A 9/9/2019    Procedure: CT (COMPUTED TOMOGRAPHY);  Surgeon: [facility] Diagnostic Provider;  Location: [facility] OR;  Service: General;  Laterality: N/A;  Microwave ablation to be done in CT.  Need CRNA and cart    COMPUTED TOMOGRAPHY N/A 1/25/2022    Procedure: Liver Microwave Ablation;  Surgeon: Red Puentes MD;  Location: [facility];  Service: General;  Laterality: N/A;    CORONARY ARTERY BYPASS GRAFT (CABG) N/A 11/5/2018    Procedure: CORONARY ARTERY BYPASS GRAFT (CABG);  Surgeon: Bear De Luna MD;  Location: [facility];  Service: Cardiothoracic;  Laterality: N/A;  TWO VESSEL BYPASS WITH AORTOTOMY AND EXCISION OF ATHEROSCLEROTIC PLAQUE    CORONARY BYPASS GRAFT ANGIOGRAPHY  3/2/2020    Procedure: Bypass graft study;  Surgeon: Cora Cormier MD;  Location: [facility] CATH LAB;  Service: Cardiology;;    CORONARY BYPASS GRAFT ANGIOGRAPHY  8/27/2024    Procedure: Bypass graft study;  Surgeon: Stanton Jefferson MD;  Location: [facility] CATH LAB;  Service: Cardiology;;    ECHOCARDIOGRAM,TRANSESOPHAGEAL N/A 6/24/2024    Procedure: Transesophageal echo (BERNARD) intra-procedure log documentation;  Surgeon: Nacho Downs MD;  Location: [facility] EP LAB;  Service: Cardiology;  Laterality: N/A;    ELBOW BURSA SURGERY Right 2010    ENDOSCOPIC HARVEST OF VEIN Left 11/5/2018    Procedure: SURGICAL PROCUREMENT, VEIN, ENDOSCOPIC;  Surgeon: Bear De Luna MD;  Location: [facility] OR;  Service: Cardiothoracic;  Laterality: Left;    ESOPHAGOGASTRODUODENOSCOPY N/A 7/17/2019    Procedure: ESOPHAGOGASTRODUODENOSCOPY (EGD);  Surgeon: David Carter III, MD;  Location: [facility] ENDO;  Service: Endoscopy;  Laterality: N/A;    ESOPHAGOGASTRODUODENOSCOPY N/A 12/29/2022    Procedure:  ESOPHAGOGASTRODUODENOSCOPY (EGD);  Surgeon: Issa Gayle MD;  Location: [facility] ENDO;  Service: Endoscopy;  Laterality: N/A;    ESOPHAGOGASTRODUODENOSCOPY N/A 1/17/2024    Procedure: EGD (ESOPHAGOGASTRODUODENOSCOPY);  Surgeon: Issa Gayle MD;  Location: [facility] ENDO;  Service: Endoscopy;  Laterality: N/A;    ESOPHAGOGASTRODUODENOSCOPY N/A 4/30/2024    Procedure: EGD (ESOPHAGOGASTRODUODENOSCOPY);  Surgeon: Eder Foley MD;  Location: [facility];  Service: Gastroenterology;  Laterality: N/A;    ESOPHAGOGASTRODUODENOSCOPY N/A 7/27/2024    Procedure: EGD (ESOPHAGOGASTRODUODENOSCOPY);  Surgeon: Ama Barrera MD;  Location: [facility];  Service: Endoscopy;  Laterality: N/A;    ETHMOIDECTOMY Bilateral 3/27/2019    Procedure: ETHMOIDECTOMY;  Surgeon: Alejandro Parkinson MD;  Location: [facility] OR;  Service: ENT;  Laterality: Bilateral;    EYE SURGERY      FOOT SURGERY      FRONTAL SINUS OBLITERATION Bilateral 3/27/2019    Procedure: SINUSOTOMY, FRONTAL SINUS, OBLITERATIVE;  Surgeon: Alejandro Parkinson MD;  Location: [facility] OR;  Service: ENT;  Laterality: Bilateral;    FUNCTIONAL ENDOSCOPIC SINUS SURGERY (FESS) Bilateral 3/27/2019    Procedure: FESS (FUNCTIONAL ENDOSCOPIC SINUS SURGERY);  Surgeon: Alejandro Parkinson MD;  Location: [facility] OR;  Service: ENT;  Laterality: Bilateral;  Left Keila bullosa resection     INTRALUMINAL GASTROINTESTINAL TRACT IMAGING VIA CAPSULE N/A 2/2/2024    Procedure: IMAGING PROCEDURE, GI TRACT, INTRALUMINAL, VIA CAPSULE;  Surgeon: Cooper Estrella RN;  Location: [facility] ENDO;  Service: Endoscopy;  Laterality: N/A;    INTRALUMINAL GASTROINTESTINAL TRACT IMAGING VIA CAPSULE N/A 7/31/2024    Procedure: IMAGING PROCEDURE, GI TRACT, INTRALUMINAL, VIA CAPSULE;  Surgeon: Cooper Estrella RN;  Location: [facility] ENDO;  Service: Endoscopy;  Laterality: N/A;    KNEE ARTHROSCOPY W/ MENISCAL REPAIR Left 06/2019    dr boykin    KNEE SURGERY Right     LEFT HEART CATHETERIZATION Left 3/2/2020    Procedure: CATHETERIZATION, HEART,  LEFT;  Surgeon: Cora Cormier MD;  Location: [facility] CATH LAB;  Service: Cardiology;  Laterality: Left;    LIVER BIOPSY  01/2022    MAXILLARY ANTROSTOMY Bilateral 3/27/2019    Procedure: MAXILLARY ANTROSTOMY;  Surgeon: Alejandro Parkinson MD;  Location: [facility] OR;  Service: ENT;  Laterality: Bilateral;    NASAL SEPTOPLASTY N/A 3/27/2019    Procedure: SEPTOPLASTY, NOSE;  Surgeon: Alejandro Parkinson MD;  Location: [facility] OR;  Service: ENT;  Laterality: N/A;    OCCLUSION OF LEFT ATRIAL APPENDAGE N/A 11/8/2024    Procedure: Left atrial appendage occlusion;  Surgeon: Horacio Huerta MD;  Location: [facility] EP LAB;  Service: Cardiology;  Laterality: N/A;  afib, watchman, BSCI, homer, ANALILIA oconnor, 3prep *contrast prep pt*    RECTAL SURGERY  2012    RIGHT HEART CATHETERIZATION Right 8/27/2024    Procedure: INSERTION, CATHETER, RIGHT HEART;  Surgeon: Stanton Jefferson MD;  Location: [facility] CATH LAB;  Service: Cardiology;  Laterality: Right;    STENT, SUPERIOR MESENTERIC ARTERY Left 1/4/2024    Procedure: STENT, SUPERIOR MESENTERIC ARTERY;  Surgeon: ALEX Alfred III, MD;  Location: [address] FLR;  Service: Vascular;  Laterality: Left;    TRANSESOPHAGEAL ECHOCARDIOGRAPHY N/A 11/8/2024    Procedure: ECHOCARDIOGRAM, TRANSESOPHAGEAL;  Surgeon: Jamar See MD;  Location: [facility] EP LAB;  Service: Cardiology;  Laterality: N/A;    TRANSESOPHAGEAL ECHOCARDIOGRAPHY N/A 12/20/2024    Procedure: ECHOCARDIOGRAM, TRANSESOPHAGEAL;  Surgeon: Thomas Israel MD;  Location: [facility] CATH LAB;  Service: Cardiology;  Laterality: N/A;    TRANSURETHRAL RESECTION OF PROSTATE (TURP) WITHOUT USE OF LASER N/A 6/19/2018    Procedure: TURP, WITHOUT USING LASER;  Surgeon: Cooper Cordova IV, MD;  Location: [facility];  Service: Urology;  Laterality: N/A;    TRIGGER FINGER RELEASE Left     VALVE STUDY-AORTIC  8/27/2024    Procedure: Valve study-aortic;  Surgeon: Stanton Jefferson MD;  Location: [facility] CATH LAB;  Service: Cardiology;;       SOCIAL HISTORY  TOBACCO: Former  smoker    REVIEW OF SYSTEMS  Non contributory, relevant information discussed in HPI    STUDIES  I independently reviewed the following studies and my interpretation is reflected in the plan:  Echocardiogram  Coronary angiogram      PHYSICAL EXAM  Physical Exam  Constitutional:       General: He is not in acute distress.  HENT:      Nose: Nose normal.   Cardiovascular:      Rate and Rhythm: Normal rate and regular rhythm.      Heart sounds: Murmur heard.      Systolic murmur is present with a grade of 3/6.   Pulmonary:      Effort: Pulmonary effort is normal.   Musculoskeletal:      Cervical back: Neck supple.      Left lower leg: No edema.   Skin:     General: Skin is warm.      Capillary Refill: Capillary refill takes less than 2 seconds.   Neurological:      Mental Status: He is alert and oriented to person, place, and time.   Psychiatric:         Mood and Affect: Mood normal.         ASSESSMENT AND PLAN  Class 2 severe obesity due to excess calories with serious comorbidity and body mass index (BMI) of 37.0 to 37.9 in adult  There is no height or weight on file to calculate BMI. Healthy lifestyle was discussed with the patient as well as the importance of physical activity to improve cardiovascular health.    Paroxysmal atrial fibrillation  S/P Watchman. Continue follow up with Dr Jefferson.     Nonrheumatic aortic valve stenosis  He is currently NYHA functional class I.  His aortic stenosis is moderate to severe on echo and cath and does not meet criteria for TAVR although its very close.   Given his lack of symptoms, will continue with clinical surveillance every 6 months.   The patient was educated about the warning signs and symptoms of progressive aortic stenosis and advised to consult sooner if they appear.     Coronary artery disease involving native coronary artery of native heart without angina pectoris  S/P CABG. Reviewed angiogram films. Continue medical Rx.     Body mass index is 36.64 kg/m². Healthy  lifestyle was discussed with the patient as well as the importance of physical activity to improve cardiovascular health.    The medication list was reviewed with the patient and inactive medications as well as duplicates were removed from the medical record.      Parth Garsia MD [facility]  Interventional Cardiology  Structural/Valvular heart disease  [phone]

## 2025-01-08 NOTE — ASSESSMENT & PLAN NOTE
He is currently NYHA functional class I.  His aortic stenosis is moderate to severe on echo and cath and does not meet criteria for TAVR although its very close.   Given his lack of symptoms, will continue with clinical surveillance every 6 months.   The patient was educated about the warning signs and symptoms of progressive aortic stenosis and advised to consult sooner if they appear.

## 2025-01-24 ENCOUNTER — TELEPHONE (OUTPATIENT)
Dept: CARDIOLOGY | Facility: CLINIC | Age: 80
End: 2025-01-24
Payer: MEDICARE

## 2025-01-24 NOTE — TELEPHONE ENCOUNTER
Attempted unsuccessfully to reach patient. Left voicemail for patient to call back to discuss  his appointment request

## 2025-01-24 NOTE — TELEPHONE ENCOUNTER
----- Message from Curt sent at 1/21/2025  2:27 PM CST -----  Contact: [phone]  Type:  Sooner Apoointment Request    Caller is requesting a sooner appointment.  Caller declined first available appointment listed below.  Caller will not accept being placed on the waitlist and is requesting a message be sent to doctor.  Name of Caller:MEGHAN MARIEE [[id number]]  When is the first available appointment?as soon as possible  Symptoms: 6 mon  Would the patient rather a call back or a response via [facility]? Call back  Best Call Back Number: [phone]  Additional Information: [id number]

## 2025-01-27 ENCOUNTER — TELEPHONE (OUTPATIENT)
Dept: CARDIOLOGY | Facility: CLINIC | Age: 80
End: 2025-01-27
Payer: MEDICARE

## 2025-01-27 NOTE — TELEPHONE ENCOUNTER
Followed up with Erendira rescheduled appointment for patient. Patient verbalized understanding of date, time, and location of appointment.

## 2025-01-27 NOTE — TELEPHONE ENCOUNTER
----- Message from Leidy sent at 1/27/2025  8:10 AM CST -----  Contact: Erednira Prakash is calling in regards to getting his appt on 01/22 rescheduled.please call back at [phone]        Thanks  YESENIA

## 2025-02-10 ENCOUNTER — PATIENT MESSAGE (OUTPATIENT)
Dept: CARDIOLOGY | Facility: CLINIC | Age: 80
End: 2025-02-10
Payer: MEDICARE

## 2025-02-10 DIAGNOSIS — I48.0 PAROXYSMAL ATRIAL FIBRILLATION: Primary | ICD-10-CM

## 2025-02-12 ENCOUNTER — OFFICE VISIT (OUTPATIENT)
Dept: CARDIOLOGY | Facility: CLINIC | Age: 80
End: 2025-02-12
Payer: MEDICARE

## 2025-02-12 ENCOUNTER — HOSPITAL ENCOUNTER (OUTPATIENT)
Dept: CARDIOLOGY | Facility: HOSPITAL | Age: 80
Discharge: HOME | End: 2025-02-12
Attending: INTERNAL MEDICINE
Payer: MEDICARE

## 2025-02-12 VITALS
WEIGHT: 230.63 LBS | DIASTOLIC BLOOD PRESSURE: 74 MMHG | HEART RATE: 110 BPM | BODY MASS INDEX: 36.2 KG/M2 | SYSTOLIC BLOOD PRESSURE: 122 MMHG | OXYGEN SATURATION: 100 % | HEIGHT: 67 IN

## 2025-02-12 VITALS
HEART RATE: 84 BPM | OXYGEN SATURATION: 98 % | HEIGHT: 67 IN | SYSTOLIC BLOOD PRESSURE: 129 MMHG | DIASTOLIC BLOOD PRESSURE: 78 MMHG | BODY MASS INDEX: 36.49 KG/M2 | WEIGHT: 232.5 LBS | RESPIRATION RATE: 16 BRPM

## 2025-02-12 DIAGNOSIS — T45.515S: ICD-10-CM

## 2025-02-12 DIAGNOSIS — Z95.818 PRESENCE OF WATCHMAN LEFT ATRIAL APPENDAGE CLOSURE DEVICE: ICD-10-CM

## 2025-02-12 DIAGNOSIS — I49.3 PVC (PREMATURE VENTRICULAR CONTRACTION): ICD-10-CM

## 2025-02-12 DIAGNOSIS — I70.0 AORTIC ATHEROSCLEROSIS: ICD-10-CM

## 2025-02-12 DIAGNOSIS — Z95.1 S/P CABG X 2: Primary | ICD-10-CM

## 2025-02-12 DIAGNOSIS — I10 ESSENTIAL HYPERTENSION: Chronic | ICD-10-CM

## 2025-02-12 DIAGNOSIS — I25.10 CORONARY ARTERY DISEASE INVOLVING NATIVE CORONARY ARTERY OF NATIVE HEART WITHOUT ANGINA PECTORIS: Chronic | ICD-10-CM

## 2025-02-12 DIAGNOSIS — I48.0 PAROXYSMAL ATRIAL FIBRILLATION: ICD-10-CM

## 2025-02-12 DIAGNOSIS — G72.0 STATIN MYOPATHY: ICD-10-CM

## 2025-02-12 DIAGNOSIS — G47.33 OSA ON CPAP: Chronic | ICD-10-CM

## 2025-02-12 DIAGNOSIS — R06.09 DOE (DYSPNEA ON EXERTION): ICD-10-CM

## 2025-02-12 DIAGNOSIS — T46.6X5A STATIN MYOPATHY: ICD-10-CM

## 2025-02-12 DIAGNOSIS — I35.0 NONRHEUMATIC AORTIC VALVE STENOSIS: ICD-10-CM

## 2025-02-12 DIAGNOSIS — E78.2 MIXED HYPERLIPIDEMIA: ICD-10-CM

## 2025-02-12 LAB
OHS QRS DURATION: 90 MS
OHS QTC CALCULATION: 408 MS

## 2025-02-12 PROCEDURE — 93010 ELECTROCARDIOGRAM REPORT: CPT | Mod: ,,, | Performed by: INTERNAL MEDICINE

## 2025-02-12 PROCEDURE — 3288F FALL RISK ASSESSMENT DOCD: CPT | Mod: CPTII,S$GLB,, | Performed by: INTERNAL MEDICINE

## 2025-02-12 PROCEDURE — 1159F MED LIST DOCD IN RCRD: CPT | Mod: CPTII,S$GLB,, | Performed by: INTERNAL MEDICINE

## 2025-02-12 PROCEDURE — 3072F LOW RISK FOR RETINOPATHY: CPT | Mod: CPTII,S$GLB,, | Performed by: INTERNAL MEDICINE

## 2025-02-12 PROCEDURE — 3078F DIAST BP <80 MM HG: CPT | Mod: CPTII,S$GLB,, | Performed by: INTERNAL MEDICINE

## 2025-02-12 PROCEDURE — 1126F AMNT PAIN NOTED NONE PRSNT: CPT | Mod: CPTII,S$GLB,, | Performed by: INTERNAL MEDICINE

## 2025-02-12 PROCEDURE — 99999 PR PBB SHADOW E&M-EST. PATIENT-LVL IV: CPT | Mod: PBBFAC,,, | Performed by: INTERNAL MEDICINE

## 2025-02-12 PROCEDURE — 1101F PT FALLS ASSESS-DOCD LE1/YR: CPT | Mod: CPTII,S$GLB,, | Performed by: INTERNAL MEDICINE

## 2025-02-12 PROCEDURE — 1157F ADVNC CARE PLAN IN RCRD: CPT | Mod: CPTII,S$GLB,, | Performed by: INTERNAL MEDICINE

## 2025-02-12 PROCEDURE — 93005 ELECTROCARDIOGRAM TRACING: CPT

## 2025-02-12 PROCEDURE — 3074F SYST BP LT 130 MM HG: CPT | Mod: CPTII,S$GLB,, | Performed by: INTERNAL MEDICINE

## 2025-02-12 PROCEDURE — 99214 OFFICE O/P EST MOD 30 MIN: CPT | Mod: S$GLB,,, | Performed by: INTERNAL MEDICINE

## 2025-02-12 RX ORDER — LISINOPRIL 10 MG/1
10 TABLET ORAL DAILY
Qty: 90 TABLET | Refills: 3 | Status: SHIPPED | OUTPATIENT
Start: 2025-02-12

## 2025-02-12 NOTE — PROGRESS NOTES
Subjective   Patient ID:  Erendira Pretty is a 79 y.o. male who presents for follow-up of Atrial Fibrillation      79 yoM CAD/CABG here for arrhythmia management.     3/24: AF noted 8/23. He was stared on rythmol but no rhythm control via CV attempt was made. He remained in AF 2/7/24, SR with long first degree AVB later in February. He had a vascular intervention in his abdomen 1/24 with subsequent GI bleeding later that month. He has been off OAC since the GI bleeding event. Xarelto was restarted    5/24: GI bleeding 4/27/24 with no source identified. Off xarelto since discharge. Remains in SR on rythmol with first degree AVB. Due for PVI 6/24/24, LAAO 6w later    10/24: No sustained recurrence. LAAO scheduled 11/8/24. He is on aspirin alone.     11/24: Due for LAAO in 2 days. We will plan to use plavix alone.     Interval history: LAAO 11/8 with no leak on BERNARD 12/20/24. Plavix only on implant    CHADSVASC of 4  HAS BLED of 4    Echo 8/24:  ·  Left Ventricle: The left ventricle is normal in size. Normal wall thickness. Septal motion is consistent with post-operative status. There is normal systolic function with a visually estimated ejection fraction of 60 - 65%. Grade II diastolic dysfunction.  ·  Right Ventricle: Normal right ventricular cavity size. Wall thickness is normal. Systolic function is normal.  ·  Left Atrium: Left atrium is severely dilated.  ·  Aortic Valve: There is severe aortic valve sclerosis. Severely restricted motion. There is moderate to severe stenosis. Aortic valve area by VTI is 0.84 cm². Aortic valve peak velocity is 3.67 m/s. Mean gradient is 34 mmHg. The dimensionless index is 0.27.  ·  Mitral Valve: There is mild regurgitation.  ·  Tricuspid Valve: There is mild regurgitation.  ·  Pulmonary Artery: The estimated pulmonary artery systolic pressure is 37 mmHg.  ·  IVC/SVC: Intermediate venous pressure at 8 mmHg.    Ablation 6/24:  ·  3D mapping performed with Ensite.  ·  Intracardiac  echo.  ·  Pulmonary vein isolation.    [facility] 8/24:  ·  Patent LIMA TO LAD and SVG OM2. Non obstructive disease of RCA unchanged from the past.  ·  The RPAV lesion was 50% stenosed.  ·  The Mid LM to Dist LM lesion was 80% stenosed.  ·  The Dist LM to Ost LAD lesion was 90% stenosed.  ·  The Ost Cx to Prox Cx lesion was 90% stenosed.  ·  The Mid Cx lesion was 100% stenosed.  ·  The pre-procedure left ventricular end diastolic pressure was 16.  ·  The estimated blood loss was none.  ·  There was two vessel coronary artery disease.  ·  The filling pressures mildly elevated.  ·  There was severe aortic valve stenosis.  ·  RADHA by Hakki equation was 1.02 cm2, pullback gradient of 38 mmhg, CO 6.36. Follow up/evaluate with structural cardiology.    Echo 2/24:  ·  Left Ventricle: The left ventricle is normal in size. Normal wall thickness. There is mild concentric hypertrophy. There is normal systolic function with a visually estimated ejection fraction of 60 - 65%. There is normal diastolic function.  ·  Right Ventricle: Normal right ventricular cavity size. Wall thickness is normal. Right ventricle wall motion  is normal. Systolic function is normal.  ·  Left Atrium: Left atrium is severely dilated.  ·  Aortic Valve: There is moderate to severe stenosis. Aortic valve area by VTI is 0.96 cm². Aortic valve peak velocity is 3.76 m/s. Mean gradient is 31 mmHg. The dimensionless index is 0.28. There is mild aortic regurgitation.  ·  Mitral Valve: There is mild regurgitation.  ·  Tricuspid Valve: There is moderate regurgitation.  ·  Pulmonary Artery: There is moderate pulmonary hypertension. The estimated pulmonary artery systolic pressure is 58 mmHg.  ·  IVC/SVC: Intermediate venous pressure at 8 mmHg.    My interpretation of the ECG is:    Past Medical History:  No date: Anticoagulant long-term use  No date: Aortic stenosis  No date: Asthma  No date: Benign prostatic hyperplasia with nocturia  07/21/2021: Bilateral carotid  artery stenosis      Comment:  US CAROTID BILATERAL 07/14/2021 IMPRESSION:                Atherosclerosis with suspected stenosis greater than 50%                at the right proximal ICA visually. Velocities are                discordant and appear improved from prior.                Atherosclerosis on the left with no evidence of                flow-limiting stenosis greater than 50%.  FINDINGS:                Right: Internal Carotid Artery (ICA): Peak systolic                velocity 118 cm/sec. End diastolic velocity 35 cm/sec  No date: BPH (benign prostatic hyperplasia)  No date: CAD (coronary artery disease)      Comment:  40% lesion 2002;lazo  No date: Cirrhosis  04/09/2014: Claudication  No date: Colon polyp  No date: COPD (chronic obstructive pulmonary disease)  No date: ED (erectile dysfunction)  No date: Encounter for blood transfusion  No date: Ex-smoker  No date: Hearing loss NEC  No date: Hepatitis C      Comment:  Cured;reji brown 2015  No date: Hyperlipidemia  No date: Hypertension  No date: Hypothyroid      Comment:  s/p tx graves  09/22/2020: Open angle with borderline findings and low glaucoma risk   in both eyes  No date: DELONTE on CPAP  No date: Osteoarthritis  No date: Paroxysmal atrial fibrillation  No date: PVD (peripheral vascular disease)  06/26/2017: Secondary esophageal varices without bleeding  No date: Type 2 diabetes mellitus    Past Surgical History:  6/24/2024: ABLATION OF ARRHYTHMOGENIC FOCUS FOR ATRIAL FIBRILLATION;   N/A      Comment:  Procedure: Ablation atrial fibrillation;  Surgeon:                Horacio Huerta MD;  Location: [facility] EP LAB;                 Service: Cardiology;  Laterality: N/A;  afib, PVI, RFA,                BERNARD (cx if SR), ZIA, anes, MB, 3 Prep, 3 hours per Dr. Huerta  8/27/2024: ANGIOGRAM, CORONARY, WITH LEFT HEART CATHETERIZATION; N/A      Comment:  Procedure: Angiogram, Coronary, with Left Heart Cath;                 Surgeon:  Stanton Jefferson MD;  Location: [facility] CATH LAB;               Service: Cardiology;  Laterality: N/A;  1/4/2024: ANGIOGRAM, EXTREMITY, UNILATERAL; Left      Comment:  Procedure: ANGIOGRAM, EXTREMITY, UNILATERAL- Femoral /                SMS Stent, Poss General;  Surgeon: ALEX Alfred III, MD;  Location: [facility] OR [address] [place] [address];  Service: Vascular;                 Laterality: Left;  mGy : 2698.78Gy.cm :                229.88Contrast : 62mlFluro time : 13.8min  8/27/2024: ARTERIOGRAPHY OF SUBCLAVIAN ARTERY      Comment:  Procedure: ARTERIOGRAM, SUBCLAVIAN;  Surgeon: Stanton Jefferson MD;  Location: [facility] CATH LAB;  Service:                Cardiology;;  No date: CARDIAC CATHETERIZATION  No date: CARDIAC SURGERY  No date: CARPAL TUNNEL RELEASE; Left  No date: CATARACT EXTRACTION  2008: CATARACT EXTRACTION W/ INTRAOCULAR LENS  IMPLANT, BILATERAL  11/2/2018: CATHETERIZATION OF BOTH LEFT AND RIGHT HEART; N/A      Comment:  Procedure: CATHETERIZATION, HEART, BOTH LEFT AND RIGHT;                Surgeon: Frank Gallardo MD;  Location: [facility] CATH                LAB;  Service: Cardiology;  Laterality: N/A;  8/2013: COLONOSCOPY  5/30/2016: COLONOSCOPY; N/A      Comment:  Procedure: COLONOSCOPY;  Surgeon: Anna Tomas MD;                 Location: [facility];  Service: Endoscopy;  Laterality:                N/A;  7/17/2019: COLONOSCOPY; N/A      Comment:  Procedure: COLONOSCOPY;  Surgeon: David Carter III, MD;  Location: [facility];  Service: Endoscopy;                 Laterality: N/A;  12/14/2023: COLONOSCOPY; N/A      Comment:  Procedure: COLONOSCOPY;  Surgeon: Ama Barrera MD;  Location: [facility];  Service: Endoscopy;                 Laterality: N/A;  7/27/2024: COLONOSCOPY; N/A      Comment:  Procedure: COLONOSCOPY;  Surgeon: Ama Barrera MD;  Location: [facility];  Service: Endoscopy;                 Laterality: N/A;  9/9/2019: COMPUTED  TOMOGRAPHY; N/A      Comment:  Procedure: CT (COMPUTED TOMOGRAPHY);  Surgeon: Sri                Diagnostic Provider;  Location: [facility] OR;  Service:                General;  Laterality: N/A;  Microwave ablation to be done               in CT.Need CRNA and cart  1/25/2022: COMPUTED TOMOGRAPHY; N/A      Comment:  Procedure: Liver Microwave Ablation;  Surgeon: Red Puentes MD;  Location: [facility];  Service: General;                 Laterality: N/A;  11/5/2018: CORONARY ARTERY BYPASS GRAFT (CABG); N/A      Comment:  Procedure: CORONARY ARTERY BYPASS GRAFT (CABG);                 Surgeon: Bear De Luna MD;  Location: [facility];                 Service: Cardiothoracic;  Laterality: N/A;  TWO VESSEL                BYPASS WITH AORTOTOMY AND EXCISION OF ATHEROSCLEROTIC                PLAQUE  3/2/2020: CORONARY BYPASS GRAFT ANGIOGRAPHY      Comment:  Procedure: Bypass graft study;  Surgeon: Cora Cormier MD;  Location: [facility] CATH LAB;  Service: Cardiology;;  8/27/2024: CORONARY BYPASS GRAFT ANGIOGRAPHY      Comment:  Procedure: Bypass graft study;  Surgeon: Stanton Jefferson MD;  Location: [facility] CATH LAB;  Service:                Cardiology;;  6/24/2024: ECHOCARDIOGRAM,TRANSESOPHAGEAL; N/A      Comment:  Procedure: Transesophageal echo (BERNARD) intra-procedure                log documentation;  Surgeon: Nacho Downs MD;                 Location: [facility] EP LAB;  Service: Cardiology;  Laterality:               N/A;  2010: ELBOW BURSA SURGERY; Right  11/5/2018: ENDOSCOPIC HARVEST OF VEIN; Left      Comment:  Procedure: SURGICAL PROCUREMENT, VEIN, ENDOSCOPIC;                 Surgeon: Bear De Luna MD;  Location: [facility] OR;                 Service: Cardiothoracic;  Laterality: Left;  7/17/2019: ESOPHAGOGASTRODUODENOSCOPY; N/A      Comment:  Procedure: ESOPHAGOGASTRODUODENOSCOPY (EGD);  Surgeon:                David Carter III, MD;  Location: [facility] ENDO;  Service:                Endoscopy;  Laterality: N/A;  12/29/2022: ESOPHAGOGASTRODUODENOSCOPY; N/A      Comment:  Procedure: ESOPHAGOGASTRODUODENOSCOPY (EGD);  Surgeon:                Issa Gayle MD;  Location: [facility];  Service:               Endoscopy;  Laterality: N/A;  1/17/2024: ESOPHAGOGASTRODUODENOSCOPY; N/A      Comment:  Procedure: EGD (ESOPHAGOGASTRODUODENOSCOPY);  Surgeon:                Issa Gayle MD;  Location: [facility];                 Service: Endoscopy;  Laterality: N/A;  4/30/2024: ESOPHAGOGASTRODUODENOSCOPY; N/A      Comment:  Procedure: EGD (ESOPHAGOGASTRODUODENOSCOPY);  Surgeon:                Eder Foley MD;  Location: [facility];  Service:                Gastroenterology;  Laterality: N/A;  7/27/2024: ESOPHAGOGASTRODUODENOSCOPY; N/A      Comment:  Procedure: EGD (ESOPHAGOGASTRODUODENOSCOPY);  Surgeon:                Ama Barrera MD;  Location: [facility];  Service:                Endoscopy;  Laterality: N/A;  3/27/2019: ETHMOIDECTOMY; Bilateral      Comment:  Procedure: ETHMOIDECTOMY;  Surgeon: Alejandro Parkinson MD;                 Location: [facility];  Service: ENT;  Laterality: Bilateral;  No date: EYE SURGERY  No date: FOOT SURGERY  3/27/2019: FRONTAL SINUS OBLITERATION; Bilateral      Comment:  Procedure: SINUSOTOMY, FRONTAL SINUS, OBLITERATIVE;                 Surgeon: Alejandro Parkinson MD;  Location: [facility];  Service:                ENT;  Laterality: Bilateral;  3/27/2019: FUNCTIONAL ENDOSCOPIC SINUS SURGERY (FESS); Bilateral      Comment:  Procedure: FESS (FUNCTIONAL ENDOSCOPIC SINUS SURGERY);                 Surgeon: Alejandro Parkinson MD;  Location: [facility];  Service:                ENT;  Laterality: Bilateral;  Left Keila bullosa                resection   2/2/2024: INTRALUMINAL GASTROINTESTINAL TRACT IMAGING VIA CAPSULE; N/A      Comment:  Procedure: IMAGING PROCEDURE, GI TRACT, INTRALUMINAL,                VIA CAPSULE;  Surgeon: Cooper Estrella RN;  Location: [facility];   Service: Endoscopy;  Laterality: N/A;  7/31/2024: INTRALUMINAL GASTROINTESTINAL TRACT IMAGING VIA CAPSULE; N/  A      Comment:  Procedure: IMAGING PROCEDURE, GI TRACT, INTRALUMINAL,                VIA CAPSULE;  Surgeon: Cooper Estrella RN;  Location: [facility]                ENDO;  Service: Endoscopy;  Laterality: N/A;  06/2019: KNEE ARTHROSCOPY W/ MENISCAL REPAIR; Left      Comment:  dr boykin  No date: KNEE SURGERY; Right  3/2/2020: LEFT HEART CATHETERIZATION; Left      Comment:  Procedure: CATHETERIZATION, HEART, LEFT;  Surgeon: Cora Cormier MD;  Location: [facility] CATH LAB;  Service:                Cardiology;  Laterality: Left;  01/2022: LIVER BIOPSY  3/27/2019: MAXILLARY ANTROSTOMY; Bilateral      Comment:  Procedure: MAXILLARY ANTROSTOMY;  Surgeon: Alejandro Parkinson MD;  Location: [facility] OR;  Service: ENT;  Laterality:                Bilateral;  3/27/2019: NASAL SEPTOPLASTY; N/A      Comment:  Procedure: SEPTOPLASTY, NOSE;  Surgeon: Alejandro Parkinson MD;                 Location: [facility] OR;  Service: ENT;  Laterality: N/A;  11/8/2024: OCCLUSION OF LEFT ATRIAL APPENDAGE; N/A      Comment:  Procedure: Left atrial appendage occlusion;  Surgeon:                Horacio Huerta MD;  Location: [facility] EP LAB;                 Service: Cardiology;  Laterality: N/A;  afib, watchman,                BSCI, homer, ANALILIA oconnor, 3prep *contrast prep pt*  2012: RECTAL SURGERY  8/27/2024: RIGHT HEART CATHETERIZATION; Right      Comment:  Procedure: INSERTION, CATHETER, RIGHT HEART;  Surgeon:                Stanton Jefferson MD;  Location: [facility] CATH LAB;                 Service: Cardiology;  Laterality: Right;  1/4/2024: STENT, SUPERIOR MESENTERIC ARTERY; Left      Comment:  Procedure: STENT, SUPERIOR MESENTERIC ARTERY;  Surgeon:                ALEX Alfred III, MD;  Location: [facility] OR 2ND FLR;                 Service: Vascular;  Laterality: Left;  11/8/2024: TRANSESOPHAGEAL ECHOCARDIOGRAPHY; N/A      Comment:  Procedure:  ECHOCARDIOGRAM, TRANSESOPHAGEAL;  Surgeon:                Jamar See MD;  Location: [facility] EP LAB;                 Service: Cardiology;  Laterality: N/A;  2024: TRANSESOPHAGEAL ECHOCARDIOGRAPHY; N/A      Comment:  Procedure: ECHOCARDIOGRAM, TRANSESOPHAGEAL;  Surgeon:                Thomas Israel MD;  Location: [facility] CATH LAB;  Service:                Cardiology;  Laterality: N/A;  2018: TRANSURETHRAL RESECTION OF PROSTATE (TURP) WITHOUT USE OF   LASER; N/A      Comment:  Procedure: TURP, WITHOUT USING LASER;  Surgeon: Cooper Cordova IV, MD;  Location: [facility] OR;  Service: Urology;                 Laterality: N/A;  No date: TRIGGER FINGER RELEASE; Left  2024: VALVE STUDY-AORTIC      Comment:  Procedure: Valve study-aortic;  Surgeon: Stanton Jefferson MD;  Location: [facility] CATH LAB;  Service:                Cardiology;;    Social History    Socioeconomic History      Marital status:       Spouse name: YAEL      Number of children: 2    Tobacco Use      Smoking status: Former        Packs/day: 0.00        Years: 0.5 packs/day for 4.0 years (2.0 ttl pk-yrs)        Types: Cigarettes        Start date: 1968        Quit date: 1972        Years since quittin.7      Smokeless tobacco: Former        Types: Chew        Quit date: 1976    Substance and Sexual Activity      Alcohol use: Not Currently      Drug use: No      Sexual activity: Yes        Partners: Female    Social History Narrative      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       wife passed away       No pets or smokers in household, lives alone.      Review of patient's family history indicates:  Problem: Heart disease      Relation: Mother          Name:               Age of Onset: (Not Specified)  Problem: Heart disease      Relation: Father          Name:               Age of Onset: (Not Specified)  Problem: Heart disease      Relation:  Brother          Name:               Age of Onset: (Not Specified)  Problem: Colon cancer      Relation: Neg Hx          Name:               Age of Onset: (Not Specified)      Current Outpatient Medications:  ACCU-CHEK GRACE PLUS METER Misc, AS DIRECTED, Disp: 1 each, Rfl: 0  albuterol (PROVENTIL HFA) 90 mcg/actuation inhaler, Inhale 2 puffs into the lungs every 6 (six) hours as needed for Wheezing. Rescue, Disp: , Rfl:   amLODIPine (NORVASC) 5 MG tablet, Take 1 tablet (5 mg total) by mouth once daily., Disp: 90 tablet, Rfl: 3  blood sugar diagnostic (ACCU-CHEK GRACE PLUS TEST STRP) Strp, TEST THREE TIMES A DAY, Disp: 100 strip, Rfl: 11  budesonide-formoterol 160-4.5 mcg (SYMBICORT) 160-4.5 mcg/actuation HFAA, INHALE 2 PUFFS INTO THE LUNGS TWO TIMES A DAY., Disp: 10.2 g, Rfl: 11  carvediloL (COREG) 3.125 MG tablet, TAKE 1 TABLET BY MOUTH TWICE DAILY with a meal, Disp: 60 tablet, Rfl: 6  cimetidine (TAGAMET) 300 MG tablet, Take one tablet by mouth on 11/7/2024 at 4:15 pm, one tablet on 11/7/2024 at 10:15 pm, and then one tablet on 11/8/2024 at 5:15 am for pre-procedure, Disp: 3 tablet, Rfl: 0  clopidogreL (PLAVIX) 75 mg tablet, Take 1 tablet (75 mg total) by mouth once daily., Disp: 90 tablet, Rfl: 3  diphenhydrAMINE (BENADRYL) 50 MG capsule, Take one tablet by mouth on 11/7/2024 at 4:15 pm, one tablet on 11/7/2024 at 10:15 pm, and then one tablet on 11/8/2024 at 5:15 am for pre-procedure, Disp: 3 capsule, Rfl: 0  empagliflozin (JARDIANCE) 25 mg tablet, Take 1 tablet (25 mg total) by mouth once daily., Disp: 90 tablet, Rfl: 3  famotidine (PEPCID) 40 MG tablet, Take 40 mg by mouth once daily., Disp: , Rfl:   finasteride (PROSCAR) 5 mg tablet, TAKE 1 TABLET BY MOUTH once daily, Disp: 90 tablet, Rfl: 3  furosemide (LASIX) 40 MG tablet, Take 1 tablet (40 mg total) by mouth once daily., Disp: 90 tablet, Rfl: 3  insulin glargine U-100, Lantus, (LANTUS SOLOSTAR U-100 INSULIN) 100 unit/mL (3 mL) InPn pen, Inject 44 Units  "into the skin every evening., Disp: 45 mL, Rfl: 1  krill oil 500 mg Cap, Take 1 capsule by mouth Daily., Disp: , Rfl:   lancets (ACCU-CHEK FASTCLIX LANCET DRUM) Misc, TEST TWO TIMES A DAY, Disp: 200 each, Rfl: 5  levothyroxine (SYNTHROID, LEVOTHROID) 175 MCG tablet, Take 2 tablets (350 mcg total) by mouth before breakfast., Disp: 180 tablet, Rfl: 1  lisinopriL 10 MG tablet, Take 1 tablet by mouth once daily, Disp: 90 tablet, Rfl: 3  metFORMIN (GLUCOPHAGE-XR) 500 MG ER 24hr tablet, Take 1 tablet (500 mg total) by mouth 2 (two) times daily with meals., Disp: 180 tablet, Rfl: 3  mirabegron (MYRBETRIQ) 50 mg Tb24, Take 1 tablet (50 mg total) by mouth once daily., Disp: 30 tablet, Rfl: 5  montelukast (SINGULAIR) 10 mg tablet, Take 1 tablet (10 mg total) by mouth once daily., Disp: 90 tablet, Rfl: 3  multivitamin (THERAGRAN) per tablet, Take 1 tablet by mouth once daily. Centrum silver, Disp: , Rfl:   pen needle, diabetic (BD ULTRA-FINE MINI PEN NEEDLE) 31 gauge x 3/16" Ndle, USE AS DIRECTED, Disp: 100 each, Rfl: 11  RABEprazole (ACIPHEX) 20 mg tablet, Take 1 tablet (20 mg total) by mouth 2 (two) times daily., Disp: 180 tablet, Rfl: 3  REPATHA SURECLICK 140 mg/mL PnIj, INJECT 140mg subcutaneously every 14 days, Disp: 2 mL, Rfl: 11  sildenafiL (VIAGRA) 100 MG tablet, Take 100 mg by mouth daily as needed for Erectile Dysfunction., Disp: , Rfl:     No current facility-administered medications for this visit.  Facility-Administered Medications Ordered in Other Visits:  lactated ringers infusion, , Intravenous, Continuous, Omaira Theodore MD, New Bag at 09/09/19 1117            Review of Systems   Constitutional: Negative. Negative for weight gain.   HENT: Negative.     Eyes: Negative.    Cardiovascular: Negative.  Negative for chest pain, leg swelling and palpitations.   Respiratory: Negative.  Negative for shortness of breath.    Endocrine: Negative.    Hematologic/Lymphatic: Negative.    Skin: Negative.  "   Musculoskeletal:  Negative for muscle weakness.   Gastrointestinal: Negative.    Genitourinary: Negative.    Neurological: Negative.  Negative for dizziness.   Psychiatric/Behavioral: Negative.     Allergic/Immunologic: Negative.    All other systems reviewed and are negative.         Objective     Physical Exam  Vitals and nursing note reviewed.   Constitutional:       Appearance: He is well-developed.   HENT:      Head: Normocephalic and atraumatic.   Eyes:      Conjunctiva/sclera: Conjunctivae normal.      Pupils: Pupils are equal, round, and reactive to light.   Cardiovascular:      Rate and Rhythm: Normal rate and regular rhythm.      Pulses: Intact distal pulses.      Heart sounds: Normal heart sounds. Murmur: 2/6 AS murmur.   Pulmonary:      Effort: Pulmonary effort is normal.      Breath sounds: Normal breath sounds.   Abdominal:      General: Bowel sounds are normal.      Palpations: Abdomen is soft.   Musculoskeletal:      Cervical back: Normal range of motion and neck supple.   Skin:     General: Skin is warm and dry.   Neurological:      Mental Status: He is alert and oriented to person, place, and time.          Assessment and Plan     1. S/P CABG x 2    2. Paroxysmal atrial fibrillation    3. Presence of Watchman left atrial appendage closure device        Plan:  79 yoM CAD/CABG, pAF here for follow up. He is now s/p Watchman, on plavix only. Continue for now and beyond 6m delia due to CAD and PVD. RTC 6m

## 2025-02-12 NOTE — PROGRESS NOTES
Subjective:   Patient ID:  Erendira Pretty is a 79 y.o. male who presents for follow-up of No chief complaint on file.  Pt did see structural heart clinic- no criteria for TAVR yet.  Echo mod-severe AS  Patient denies CP, angina or anginal equivalent since last visit  Hypertension  This is a chronic problem. The current episode started more than 1 year ago. The problem has been gradually improving since onset. The problem is controlled. Pertinent negatives include no chest pain, palpitations or shortness of breath. Past treatments include ACE inhibitors, beta blockers and calcium channel blockers. The current treatment provides moderate improvement. There are no compliance problems.  Hypertensive end-organ damage includes CAD/MI.   Coronary Artery Disease  Presents for follow-up visit. Pertinent negatives include no chest pain, chest pressure, chest tightness, dizziness, leg swelling, muscle weakness, palpitations, shortness of breath or weight gain. The symptoms have been stable. Compliance with diet is variable. Compliance with exercise is variable. Compliance with medications is good.   Hyperlipidemia  This is a chronic problem. The current episode started more than 1 year ago. The problem is controlled. Pertinent negatives include no chest pain or shortness of breath. Treatments tried: repatha. The current treatment provides moderate improvement of lipids. There are no compliance problems.        Review of Systems   Constitutional: Negative. Negative for weight gain.   HENT: Negative.     Eyes: Negative.    Cardiovascular: Negative.  Negative for chest pain, leg swelling and palpitations.   Respiratory: Negative.  Negative for chest tightness and shortness of breath.    Endocrine: Negative.    Hematologic/Lymphatic: Negative.    Skin: Negative.    Musculoskeletal:  Negative for muscle weakness.   Gastrointestinal: Negative.    Genitourinary: Negative.    Neurological: Negative.  Negative for dizziness.    Psychiatric/Behavioral: Negative.     Allergic/Immunologic: Negative.    All other systems reviewed and are negative.    Family History   Problem Relation Name Age of Onset    Heart disease Mother      Heart disease Father      Heart disease Brother      Colon cancer Neg Hx       Past Medical History:   Diagnosis Date    Anticoagulant long-term use     Aortic stenosis     Asthma     Benign prostatic hyperplasia with nocturia     Bilateral carotid artery stenosis 07/21/2021     CAROTID BILATERAL 07/14/2021 IMPRESSION: Atherosclerosis with suspected stenosis greater than 50% at the right proximal ICA visually. Velocities are discordant and appear improved from prior. Atherosclerosis on the left with no evidence of flow-limiting stenosis greater than 50%.  FINDINGS: Right: Internal Carotid Artery (ICA): Peak systolic velocity 118 cm/sec. 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 09/22/2020    DELONTE on CPAP     Osteoarthritis     Paroxysmal atrial fibrillation     PVD (peripheral vascular disease)     Secondary esophageal varices without bleeding 06/26/2017    Type 2 diabetes mellitus      Social History     Socioeconomic History    Marital status:      Spouse name: YAEL    Number of children: 2   Tobacco Use    Smoking status: Former     Current packs/day: 0.00     Average packs/day: 0.5 packs/day for 4.0 years (2.0 ttl pk-yrs)     Types: Cigarettes     Start date: 6/12/1968     Quit date: 6/12/1972     Years since quitting:  52.7    Smokeless tobacco: Former     Types: Chew     Quit date: 11/18/1976   Substance and Sexual Activity    Alcohol use: Not Currently    Drug use: No    Sexual activity: Yes     Partners: Female   Social History Narrative    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    No pets or smokers in household, lives alone.     Current Outpatient Medications on File Prior to Visit   Medication Sig Dispense Refill    ACCU-CHEK GRACE PLUS METER Misc AS DIRECTED 1 each 0    albuterol (PROVENTIL HFA) 90 mcg/actuation inhaler Inhale 2 puffs into the lungs every 6 (six) hours as needed for Wheezing. Rescue      amLODIPine (NORVASC) 5 MG tablet Take 1 tablet (5 mg total) by mouth once daily. 90 tablet 3    blood sugar diagnostic (ACCU-CHEK GRACE PLUS TEST STRP) Strp TEST THREE TIMES A  strip 11    budesonide-formoterol 160-4.5 mcg (SYMBICORT) 160-4.5 mcg/actuation HFAA INHALE 2 PUFFS INTO THE LUNGS TWO TIMES A DAY. 10.2 g 11    carvediloL (COREG) 3.125 MG tablet TAKE 1 TABLET BY MOUTH TWICE DAILY with a meal 60 tablet 6    cimetidine (TAGAMET) 300 MG tablet Take one tablet by mouth on 11/7/2024 at 4:15 pm, one tablet on 11/7/2024 at 10:15 pm, and then one tablet on 11/8/2024 at 5:15 am for pre-procedure 3 tablet 0    clopidogreL (PLAVIX) 75 mg tablet Take 1 tablet (75 mg total) by mouth once daily. 90 tablet 3    diphenhydrAMINE (BENADRYL) 50 MG capsule Take one tablet by mouth on 11/7/2024 at 4:15 pm, one tablet on 11/7/2024 at 10:15 pm, and then one tablet on 11/8/2024 at 5:15 am for pre-procedure 3 capsule 0    empagliflozin (JARDIANCE) 25 mg tablet Take 1 tablet (25 mg total) by mouth once daily. 90 tablet 3    famotidine (PEPCID) 40 MG tablet Take 40 mg by mouth once daily.      finasteride (PROSCAR) 5 mg tablet TAKE 1 TABLET BY MOUTH once daily 90 tablet 3    furosemide (LASIX) 40 MG tablet Take 1 tablet (40 mg total) by mouth once daily. 90 tablet 3     "insulin glargine U-100, Lantus, (LANTUS SOLOSTAR U-100 INSULIN) 100 unit/mL (3 mL) InPn pen Inject 44 Units into the skin every evening. 45 mL 1    krill oil 500 mg Cap Take 1 capsule by mouth Daily.      lancets (ACCU-CHEK FASTCLIX LANCET DRUM) Misc TEST TWO TIMES A  each 5    levothyroxine (SYNTHROID, LEVOTHROID) 175 MCG tablet Take 2 tablets (350 mcg total) by mouth before breakfast. 180 tablet 1    lisinopriL 10 MG tablet Take 1 tablet by mouth once daily 90 tablet 3    metFORMIN (GLUCOPHAGE-XR) 500 MG ER 24hr tablet Take 1 tablet (500 mg total) by mouth 2 (two) times daily with meals. 180 tablet 3    mirabegron (MYRBETRIQ) 50 mg Tb24 Take 1 tablet (50 mg total) by mouth once daily. 30 tablet 5    montelukast (SINGULAIR) 10 mg tablet Take 1 tablet (10 mg total) by mouth once daily. 90 tablet 3    multivitamin (THERAGRAN) per tablet Take 1 tablet by mouth once daily. Centrum silver      pen needle, diabetic (BD ULTRA-FINE MINI PEN NEEDLE) 31 gauge x 3/16" Ndle USE AS DIRECTED 100 each 11    RABEprazole (ACIPHEX) 20 mg tablet Take 1 tablet (20 mg total) by mouth 2 (two) times daily. 180 tablet 3    REPATHA SURECLICK 140 mg/mL PnIj INJECT 140mg subcutaneously every 14 days 2 mL 11    sildenafiL (VIAGRA) 100 MG tablet Take 100 mg by mouth daily as needed for Erectile Dysfunction.       Current Facility-Administered Medications on File Prior to Visit   Medication Dose Route Frequency Provider Last Rate Last Admin    lactated ringers infusion   Intravenous Continuous Omaira Theodore MD   New Bag at 09/09/19 1117     Review of patient's allergies indicates:   Allergen Reactions    Lipitor [atorvastatin] Other (See Comments)     Muscle aches and pains as well as fatigue.     Niacin preparations Other (See Comments)     Causes broken blood vessels and bruises at 4 times normal dose.    Statins-hmg-coa reductase inhibitors Other (See Comments)     Statins cause intolerable myalgias.    Iodinated contrast " media Hives     Tachycardia    Omeprazole Hives and Itching    Prilosec [omeprazole magnesium] Hives and Itching       Objective:     Physical Exam  Vitals and nursing note reviewed.   Constitutional:       Appearance: He is well-developed.   HENT:      Head: Normocephalic and atraumatic.   Eyes:      Conjunctiva/sclera: Conjunctivae normal.      Pupils: Pupils are equal, round, and reactive to light.   Cardiovascular:      Rate and Rhythm: Normal rate and regular rhythm.      Pulses: Intact distal pulses.      Heart sounds: Normal heart sounds. Murmur: 2/6 AS murmur.   Pulmonary:      Effort: Pulmonary effort is normal.      Breath sounds: Normal breath sounds.   Abdominal:      General: Bowel sounds are normal.      Palpations: Abdomen is soft.   Musculoskeletal:      Cervical back: Normal range of motion and neck supple.   Skin:     General: Skin is warm and dry.   Neurological:      Mental Status: He is alert and oriented to person, place, and time.         Assessment:     1. S/P CABG x 2    2. PVC (premature ventricular contraction)    3. Presence of Watchman left atrial appendage closure device    4. Paroxysmal atrial fibrillation    5. Nonrheumatic aortic valve stenosis    6. Mixed hyperlipidemia    7. Essential hypertension    8. MARCUS (dyspnea on exertion)    9. Coronary artery disease involving native coronary artery of native heart without angina pectoris    10. Aortic atherosclerosis    11. Statin myopathy    12. DELONTE on CPAP    13. Anticoagulant causing adverse effect in therapeutic use, sequela        Plan:     S/P CABG x 2    PVC (premature ventricular contraction)    Presence of Watchman left atrial appendage closure device    Paroxysmal atrial fibrillation    Nonrheumatic aortic valve stenosis    Mixed hyperlipidemia    Essential hypertension    MARCUS (dyspnea on exertion)    Coronary artery disease involving native coronary artery of native heart without angina pectoris    Aortic  atherosclerosis    Statin myopathy    DELONTE on CPAP    Anticoagulant causing adverse effect in therapeutic use, sequela      Continue lisinopril , norvasc , coreg- HTN/PAF  continue repatha - HLP

## 2025-02-13 ENCOUNTER — LAB VISIT (OUTPATIENT)
Dept: LAB | Facility: HOSPITAL | Age: 80
End: 2025-02-13
Attending: NURSE PRACTITIONER
Payer: MEDICARE

## 2025-02-13 DIAGNOSIS — Z87.19 HISTORY OF MELENA: ICD-10-CM

## 2025-02-13 DIAGNOSIS — E61.1 IRON DEFICIENCY: ICD-10-CM

## 2025-02-13 DIAGNOSIS — K74.69 COMPENSATED HCV CIRRHOSIS: ICD-10-CM

## 2025-02-13 DIAGNOSIS — B19.20 COMPENSATED HCV CIRRHOSIS: ICD-10-CM

## 2025-02-13 LAB
ALBUMIN SERPL BCP-MCNC: 3.6 G/DL (ref 3.5–5.2)
ALP SERPL-CCNC: 82 U/L (ref 40–150)
ALT SERPL W/O P-5'-P-CCNC: 18 U/L (ref 10–44)
ANION GAP SERPL CALC-SCNC: 11 MMOL/L (ref 8–16)
AST SERPL-CCNC: 19 U/L (ref 10–40)
BASOPHILS # BLD AUTO: 0.02 K/UL (ref 0–0.2)
BASOPHILS NFR BLD: 0.5 % (ref 0–1.9)
BILIRUB SERPL-MCNC: 0.4 MG/DL (ref 0.1–1)
BUN SERPL-MCNC: 18 MG/DL (ref 8–23)
CALCIUM SERPL-MCNC: 9.1 MG/DL (ref 8.7–10.5)
CHLORIDE SERPL-SCNC: 103 MMOL/L (ref 95–110)
CO2 SERPL-SCNC: 25 MMOL/L (ref 23–29)
CREAT SERPL-MCNC: 1.2 MG/DL (ref 0.5–1.4)
DIFFERENTIAL METHOD BLD: ABNORMAL
EOSINOPHIL # BLD AUTO: 0.1 K/UL (ref 0–0.5)
EOSINOPHIL NFR BLD: 2.3 % (ref 0–8)
ERYTHROCYTE [DISTWIDTH] IN BLOOD BY AUTOMATED COUNT: 14.6 % (ref 11.5–14.5)
EST. GFR  (NO RACE VARIABLE): >60 ML/MIN/1.73 M^2
FERRITIN SERPL-MCNC: 24 NG/ML (ref 20–300)
GLUCOSE SERPL-MCNC: 215 MG/DL (ref 70–110)
HCT VFR BLD AUTO: 41.7 % (ref 40–54)
HGB BLD-MCNC: 13.5 G/DL (ref 14–18)
IMM GRANULOCYTES # BLD AUTO: 0.01 K/UL (ref 0–0.04)
IMM GRANULOCYTES NFR BLD AUTO: 0.3 % (ref 0–0.5)
IRON SERPL-MCNC: 48 UG/DL (ref 45–160)
LYMPHOCYTES # BLD AUTO: 0.9 K/UL (ref 1–4.8)
LYMPHOCYTES NFR BLD: 22.4 % (ref 18–48)
MCH RBC QN AUTO: 29 PG (ref 27–31)
MCHC RBC AUTO-ENTMCNC: 32.4 G/DL (ref 32–36)
MCV RBC AUTO: 90 FL (ref 82–98)
MONOCYTES # BLD AUTO: 0.5 K/UL (ref 0.3–1)
MONOCYTES NFR BLD: 12.1 % (ref 4–15)
NEUTROPHILS # BLD AUTO: 2.5 K/UL (ref 1.8–7.7)
NEUTROPHILS NFR BLD: 62.4 % (ref 38–73)
NRBC BLD-RTO: 0 /100 WBC
PLATELET # BLD AUTO: 152 K/UL (ref 150–450)
PMV BLD AUTO: 9.8 FL (ref 9.2–12.9)
POTASSIUM SERPL-SCNC: 4.6 MMOL/L (ref 3.5–5.1)
PROT SERPL-MCNC: 7.5 G/DL (ref 6–8.4)
RBC # BLD AUTO: 4.65 M/UL (ref 4.6–6.2)
SATURATED IRON: 11 % (ref 20–50)
SODIUM SERPL-SCNC: 139 MMOL/L (ref 136–145)
TOTAL IRON BINDING CAPACITY: 425 UG/DL (ref 250–450)
TRANSFERRIN SERPL-MCNC: 287 MG/DL (ref 200–375)
WBC # BLD AUTO: 3.98 K/UL (ref 3.9–12.7)

## 2025-02-13 PROCEDURE — 80053 COMPREHEN METABOLIC PANEL: CPT | Performed by: NURSE PRACTITIONER

## 2025-02-13 PROCEDURE — 82728 ASSAY OF FERRITIN: CPT | Performed by: NURSE PRACTITIONER

## 2025-02-13 PROCEDURE — 83540 ASSAY OF IRON: CPT | Performed by: NURSE PRACTITIONER

## 2025-02-13 PROCEDURE — 36415 COLL VENOUS BLD VENIPUNCTURE: CPT | Mod: PN | Performed by: NURSE PRACTITIONER

## 2025-02-13 PROCEDURE — 85025 COMPLETE CBC W/AUTO DIFF WBC: CPT | Performed by: NURSE PRACTITIONER

## 2025-02-17 ENCOUNTER — OFFICE VISIT (OUTPATIENT)
Dept: HEMATOLOGY/ONCOLOGY | Facility: CLINIC | Age: 80
End: 2025-02-17
Payer: MEDICARE

## 2025-02-17 DIAGNOSIS — D64.9 NORMOCYTIC ANEMIA: Primary | ICD-10-CM

## 2025-02-17 DIAGNOSIS — D50.9 IRON DEFICIENCY ANEMIA, UNSPECIFIED IRON DEFICIENCY ANEMIA TYPE: ICD-10-CM

## 2025-02-17 RX ORDER — EPINEPHRINE 0.3 MG/.3ML
0.3 INJECTION SUBCUTANEOUS ONCE AS NEEDED
OUTPATIENT
Start: 2025-02-17

## 2025-02-17 RX ORDER — METHYLPREDNISOLONE SOD SUCC 125 MG
60 VIAL (EA) INJECTION
Start: 2025-02-17

## 2025-02-17 RX ORDER — SODIUM FERRIC GLUCONATE COMPLEX IN SUCROSE 12.5 MG/ML
250 INJECTION INTRAVENOUS
OUTPATIENT
Start: 2025-02-17

## 2025-02-17 RX ORDER — SODIUM CHLORIDE 0.9 % (FLUSH) 0.9 %
10 SYRINGE (ML) INJECTION
OUTPATIENT
Start: 2025-02-17

## 2025-02-17 RX ORDER — DIPHENHYDRAMINE HYDROCHLORIDE 50 MG/ML
50 INJECTION INTRAMUSCULAR; INTRAVENOUS ONCE AS NEEDED
OUTPATIENT
Start: 2025-02-17

## 2025-02-17 NOTE — PROGRESS NOTES
Audio Only Telehealth Visit     The patient location is: home  The chief complaint leading to consultation is: no complaint  Visit type: Virtual visit with audio only (telephone)  Total time spent in medical discussion with patient: 15 minutes  Total time spent on date of the encounter:30 minutes       The reason for the audio only service rather than synchronous audio and video virtual visit was related to technical difficulties or patient preference/necessity.       Each patient to whom I provide medical services by telemedicine is:  (1) informed of the relationship between the physician and patient and the respective role of any other health care provider with respect to management of the patient; and (2) notified that they may decline to receive medical services by telemedicine and may withdraw from such care at any time. Patient verbally consented to receive this service via voice-only telephone call.       HPI:  80 yo male presents to the hematology clinic as a new patient for further evaluation and treatment of iron deficiency anemia.  He has been referred to the clinic by his pcp, Dr. Callaway.  He was seen many years ago in the clinic by Dr. Ramón Westbrook for SEBASTIAN and received IV iron at that time.  Is being followed by hepatology for h/o of hcv cured with cirrhosis and esophageal varices. Liver CA diagnosed x 2.   Has recently reported incident of melena which has resolved.     States that all of his anticoagulants and antiplatelets have been stopped.  Is only taking asa 81 mg PO daily.     States that he has been taking an oral iron supplement for about 15 days now.  He denies an GI upset.       Left leg numbness that started 2/26/2024 about 3 weeks after stopping antiplatelets and anticoagulation.  States it is ok in the morning but when he starts to move around the leg becomes numb.     Family hx of cancer:  Brother: lung cancer  Brother #2: skin cancer  Self: HCC     Drink a few beers a day starting  last week.  Denies cigarette smoking.  Denies illicit drug use.       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     Denies f/c/ns.  Had some unintentional weight loss from December till now - about 20 lbs before stent was placed - due to abdominal pain and diarrhea.       Denies any known abnormal lymphadenopathy.       Interval History:  5/6/2024  Found with SEBASTIAN due to GI blood loss and was given 2 doses of Feraheme with last dose received on 3/26/2024.  With continued anemia - hgb 9.6 mcv 89, saturated iron 5%.  States was hospitalized last Sunday for melena x 1 episode.  EGD done 4/30/2024 - did not find H. Pylori or evidence of bleeding.  Currently being followed by GI.  States has no episodes since Sunday before last.  Only on baby asa now.     Interval History:  7/18/2024 Received Feraheme infusions x 2 with last dose received on 6/3/2024.  On 7/15/2024 discharged from hospital originally admitted for shortness of breath and pain on legs with ambulation that resolves when stopping.  Is back on Xarelto and ASA and will be having watchman procedure done soon.  Stool is dark color.  Patient states that she did receive IV push iron in the hospital.  States he is currently having issues with constipation.  Has taken slowfe in the past and was unable to tolerate.     Interval History:  9/9/2024 Found with recurrent iron deficiency anemia and given feraheme infusions x 2 with last dose received on 7/3/2024.  Presents today to evaluate response.  Ferritin has normalized; however still with saturated iron is 6%. Hgb 10.4 g/dL, mcv 91, platelet count 148.  Leukocytopenia with absolute lymphocytopenia present.  Denies f/c/ns or unintentional weight loss.  Denies any known abnormal lymphadenopathy. Has chronic left shoulder pain and received a steroid injection about 3 weeks ago.     Interval History:  11/13/2024  Found with recurrent SEBASTIAN and given 2 additional doses of IV  Feraheme with last dose received on 9/30/2024.  Presents today to evaluate response. VCS done 7/31/2024 Impression:  Erythematous gastropathy. Poor bowel preparation. No signs of active bleeding. States that he is feeling much better.  Recently had Watchman procedure done and states is feeling well.  Denies any known bleeding.  Currently not anemic with slight iron deficiency.     Interval History:  2/17/2025 At last visit labs were good and he was to continue to eat foods high in iron.  He presents today after 3 months to assess labs for need for additional IV iron therapy.  No longer on Eliquis.  Is currently on plavix.  Slight anemia with hgb 13.5 normocytic, saturated iron 11%. Denies any known abnormal bleeding.  Continues to be followed by hepatology.  Has no complaints today.         Assessment and plan:  Will give Ferrlecit 250 mg IV x 2 weekly doses at TG.  F/u 5 weeks after last dose of IV iron with labs prior - would like audio only visit.        Med Onc Chart Routing      Follow up with physician    Follow up with VASILIY . F/u 5 weeks after last dose of IV iron with labs prior - Audio only visit   Infusion scheduling note   schedule Ferrlecit 250 mg IV x 2 weekly doses at TG   Injection scheduling note n/a   Labs   Scheduling:  Preferred lab: [facility]  Lab interval:  cbc, bmp, iron studies   Imaging   N/a   Pharmacy appointment No pharmacy appointment needed      Other referrals       No additional referrals needed  n/a                   This service was not originating from a related E/M service provided within the previous 7 days nor will  to an E/M service or procedure within the next 24 hours or my soonest available appointment.  Prevailing standard of care was able to be met in this audio-only visit.

## 2025-02-20 RX ORDER — SILDENAFIL 100 MG/1
100 TABLET, FILM COATED ORAL DAILY PRN
Qty: 10 TABLET | Refills: 11 | Status: SHIPPED | OUTPATIENT
Start: 2025-02-20

## 2025-02-20 NOTE — TELEPHONE ENCOUNTER
----- Message from [employer] sent at 2/20/2025  2:28 PM CST -----  Contact: self  ..Type:  RX Refill RequestWho Called: .Erendira PretytRefill or New Rx:Refill RX Name and Strength: sildenafiL (VIAGRA) 100 MG tabletHow is the patient currently taking it? (ex. 1XDay):Is this a 30 day or 90 day RX:30Preferred Pharmacy with phone number:.[place], [place] - [address] [facility] [id number] [address] [address]: [phone] Fax: [phone] or Mail Order:local Ordering Provider:Cesia Would the patient rather a call back or a response via [facility]? Call [facility] Best Call Back Number:.[phone] (home) Additional Information:

## 2025-02-20 NOTE — TELEPHONE ENCOUNTER
Care Due:                  Date            Visit Type   Department     Provider  --------------------------------------------------------------------------------                                HOSPITAL     [facility]  Last Visit: 08-      FOLLOW UP    MEDICINE       Bhupinder Callaway                              EP -                              PRIMARY      [facility]  Next Visit: 04-      CARE ([facility])   MEDICINE       Bhupinder Callaway                                                            Last  Test          Frequency    Reason                     Performed    Due Date  --------------------------------------------------------------------------------    HBA1C.......  6 months...  empagliflozin, insulin,    10-   04-                             metFORMIN................    [employer] Embedded Care Due Messages. Reference number: [id number].   2/20/2025 2:38:17 PM CST

## 2025-02-21 ENCOUNTER — PATIENT MESSAGE (OUTPATIENT)
Dept: INFUSION THERAPY | Facility: HOSPITAL | Age: 80
End: 2025-02-21
Payer: MEDICARE

## 2025-03-03 ENCOUNTER — TELEPHONE (OUTPATIENT)
Dept: OTOLARYNGOLOGY | Facility: CLINIC | Age: 80
End: 2025-03-03
Payer: MEDICARE

## 2025-03-03 DIAGNOSIS — J32.4 CHRONIC PANSINUSITIS: Primary | ICD-10-CM

## 2025-03-04 ENCOUNTER — TELEPHONE (OUTPATIENT)
Dept: INTERNAL MEDICINE | Facility: CLINIC | Age: 80
End: 2025-03-04
Payer: MEDICARE

## 2025-03-04 ENCOUNTER — INFUSION (OUTPATIENT)
Dept: INFUSION THERAPY | Facility: HOSPITAL | Age: 80
End: 2025-03-04
Attending: PEDIATRICS
Payer: MEDICARE

## 2025-03-04 VITALS
SYSTOLIC BLOOD PRESSURE: 131 MMHG | HEART RATE: 80 BPM | DIASTOLIC BLOOD PRESSURE: 70 MMHG | RESPIRATION RATE: 18 BRPM | OXYGEN SATURATION: 98 % | TEMPERATURE: 98 F

## 2025-03-04 DIAGNOSIS — J32.4 CHRONIC PANSINUSITIS: ICD-10-CM

## 2025-03-04 DIAGNOSIS — D50.0 IRON DEFICIENCY ANEMIA DUE TO CHRONIC BLOOD LOSS: Primary | ICD-10-CM

## 2025-03-04 PROCEDURE — 96375 TX/PRO/DX INJ NEW DRUG ADDON: CPT

## 2025-03-04 PROCEDURE — 96374 THER/PROPH/DIAG INJ IV PUSH: CPT

## 2025-03-04 PROCEDURE — 63600175 PHARM REV CODE 636 W HCPCS: Performed by: NURSE PRACTITIONER

## 2025-03-04 RX ORDER — SODIUM FERRIC GLUCONATE COMPLEX IN SUCROSE 12.5 MG/ML
250 INJECTION INTRAVENOUS
Status: CANCELLED | OUTPATIENT
Start: 2025-03-11

## 2025-03-04 RX ORDER — EPINEPHRINE 0.3 MG/.3ML
0.3 INJECTION SUBCUTANEOUS ONCE AS NEEDED
OUTPATIENT
Start: 2025-03-11

## 2025-03-04 RX ORDER — DIPHENHYDRAMINE HYDROCHLORIDE 50 MG/ML
50 INJECTION INTRAMUSCULAR; INTRAVENOUS ONCE AS NEEDED
Status: DISCONTINUED | OUTPATIENT
Start: 2025-03-04 | End: 2025-03-04 | Stop reason: HOSPADM

## 2025-03-04 RX ORDER — SODIUM CHLORIDE 0.9 % (FLUSH) 0.9 %
10 SYRINGE (ML) INJECTION
OUTPATIENT
Start: 2025-03-11

## 2025-03-04 RX ORDER — SODIUM CHLORIDE 0.9 % (FLUSH) 0.9 %
10 SYRINGE (ML) INJECTION
Status: DISCONTINUED | OUTPATIENT
Start: 2025-03-04 | End: 2025-03-04 | Stop reason: HOSPADM

## 2025-03-04 RX ORDER — EPINEPHRINE 0.3 MG/.3ML
0.3 INJECTION SUBCUTANEOUS ONCE AS NEEDED
Status: DISCONTINUED | OUTPATIENT
Start: 2025-03-04 | End: 2025-03-04 | Stop reason: HOSPADM

## 2025-03-04 RX ORDER — MONTELUKAST SODIUM 10 MG/1
10 TABLET ORAL NIGHTLY
Qty: 30 TABLET | Refills: 0 | OUTPATIENT
Start: 2025-03-04 | End: 2025-04-03

## 2025-03-04 RX ORDER — DIPHENHYDRAMINE HYDROCHLORIDE 50 MG/ML
50 INJECTION INTRAMUSCULAR; INTRAVENOUS ONCE AS NEEDED
OUTPATIENT
Start: 2025-03-11

## 2025-03-04 RX ORDER — SODIUM FERRIC GLUCONATE COMPLEX IN SUCROSE 12.5 MG/ML
125 INJECTION INTRAVENOUS ONCE
Start: 2025-03-11 | End: 2025-03-11

## 2025-03-04 RX ORDER — METHYLPREDNISOLONE SOD SUCC 125 MG
60 VIAL (EA) INJECTION
Status: CANCELLED
Start: 2025-03-11

## 2025-03-04 RX ORDER — SODIUM FERRIC GLUCONATE COMPLEX IN SUCROSE 12.5 MG/ML
250 INJECTION INTRAVENOUS
Status: DISCONTINUED | OUTPATIENT
Start: 2025-03-04 | End: 2025-03-04

## 2025-03-04 RX ORDER — SODIUM FERRIC GLUCONATE COMPLEX IN SUCROSE 12.5 MG/ML
125 INJECTION INTRAVENOUS ONCE
Status: COMPLETED | OUTPATIENT
Start: 2025-03-04 | End: 2025-03-04

## 2025-03-04 RX ORDER — METHYLPREDNISOLONE SOD SUCC 125 MG
60 VIAL (EA) INJECTION
Start: 2025-03-11

## 2025-03-04 RX ADMIN — SODIUM FERRIC GLUCONATE COMPLEX IN SUCROSE 125 MG: 12.5 INJECTION INTRAVENOUS at 01:03

## 2025-03-04 RX ADMIN — METHYLPREDNISOLONE SODIUM SUCCINATE 60 MG: 40 INJECTION, POWDER, FOR SOLUTION INTRAMUSCULAR; INTRAVENOUS at 12:03

## 2025-03-04 NOTE — TELEPHONE ENCOUNTER
Pt requested for us to call him about refilling a medication montelukast. Medication is not on chart. Patient says Dr. Parkinson originally prescribed it and now he sees Dr. Hayward. Dr. Hayward asked for him to ask his Primary Care doctor to refill it. He is asking if you fill it can it be a 90 day supply.

## 2025-03-04 NOTE — TELEPHONE ENCOUNTER
Spoke to pt and informed Dr. Bailon recs getting from PCP. Pt states will give them a call.----- Message from Flaco Bailon MD sent at 3/4/2025  8:53 AM CST -----  Contact: Patient @ [phone]  Would recommend they get that through their PCP at this point.  ----- Message -----  From: Ying Snow LPN  Sent: 3/3/2025  10:36 AM CST  To: Flaco Bailon MD    This pt had singulair with Dr. Parkinson back in 2017. It is not on active med list. Pended order to you  ----- Message -----  From: Julianna Salcido  Sent: 3/3/2025  10:26 AM CST  To: Adamaris Sam Staff    Requesting an RX refill or new RX.Is this a refill or new RX: RX name and strength montelukast (SINGULAIR) 10 mg tabletIs this a 30 day or 90 day RX: Pharmacy name and phone # [facility] - [facility], [place] - [address] [facility] [id number] [facility] [address] [facility] [address]: [phone] Fax: [phone] doctors have asked that we provide their patients with the following 2 reminders -- prescription refills can take up to 72 hours, and a friendly reminder that in the future you can use your [facility] account to request refills: yes

## 2025-03-05 RX ORDER — MONTELUKAST SODIUM 10 MG/1
10 TABLET ORAL DAILY
Qty: 90 TABLET | Refills: 3 | Status: SHIPPED | OUTPATIENT
Start: 2025-03-05 | End: 2026-03-05

## 2025-03-07 NOTE — TELEPHONE ENCOUNTER
The patient location is: [facility]  The chief complaint leading to consultation is: Birth control counseling  Visit type: audiovisual  Total time spent with patient: 15 min    Each patient to whom he or she provides medical services by telemedicine is:  (1) informed of the relationship between the physician and patient and the respective role of any other health care provider with respect to management of the patient; and (2) notified that he or she may decline to receive medical services by telemedicine and may withdraw from such care at any time.    Notes:     Chief Complaint: Birth control counseling     HPI:      Sara Vides is a 18 y.o.  who presents today with her mom to discuss birth control options. Started OCP in August of last year for birth control, acne, and PMS. Her main concerns today are related to her acne and mood swings. Reports no change in skin since starting OCP. Mood swings are the same maybe even a little worse. No specific time that mood swings occur but they are worse around/during her period.  Her periods have gotten lighter since starting the birth control. Still has some cramps during her period but they aren't significant and are manageable. Mom has concerns that she sometimes forgets to take her pill. No BTB. Ms. Vides is currently sexually active with a single male partner.     ROS:     GENERAL: Denies fevers or chills. Feeling well overall.   ABDOMEN: Denies abdominal pain, constipation, diarrhea, nausea, vomiting, change in appetite.   URINARY: Denies frequency, dysuria, hematuria.  GYNECOLOGIC: See HPI.  NEUROLOGIC: Denies syncope or weakness.     Physical Exam:     APPEARANCE: Well nourished, well developed, in no acute distress.  RESP: No respiratory distress appreciated.   The rest of the exam was deferred due to televisit.    Assessment/Plan:     Birth control counseling  -     drospirenone-ethinyl estradioL (SNEHAL) 3-0.02 mg per tablet; Take 1 tablet by  Patient states the antibiotics prescribed for prostatitis did not work. Patient still experiencing symptoms. Scheduled patient to see Dr. Cordova tomorrow.   mouth once daily.  Dispense: 84 tablet; Refill: 3    Acne, unspecified acne type  -     clindamycin phosphate 1% 1 % gel; Apply topically once daily.  Dispense: 60 g; Refill: 3  -     tretinoin (RETIN-A) 0.025 % gel; Apply topically every evening.  Dispense: 45 g; Refill: 3    Counseled patient regarding various methods of birth control methods available, to include: OCPs, progesterone only pills, IUD (hormonal vs copper), patch, Depo-Provera, Nuvaring, condoms, and Nexplanon. We specifically discussed the negatives of possible weight gain with Depo injections and sporadic bleeding with Nexplanon.  Reviewed that the IUD won't benefit her in regards to acne or mood swings as this is not a systemic hormonal birth control.   Discussed that based on her main concerns regarding acne and mood swings I believe she would benefit most from staying on a combo OCP but switching the one she is on to another one. I reminded her that she would still need to take the pill daily. Reviewed that if she misses a pill she should use condoms as a form of back up birth control or abstain from sex for the month. I additionally reminded her that her OCP will not protect her against STD's and that she should use condoms with every sexual encounter.  After discussing risks and benefits of each method, she decided she would like to stay on combo OCP but switch to a different one to see if it helps more with her acne and mood swings. Rx Angely.  Additionally discussed skin care routine for acne. Recommend tretinoin and clindamycin gels. Will send portal message with specific instructions of use along with wash.   If mood swings do not improve on new OCP, I would consider starting SSRI.    Follow up in about 3 months.    Kelley Gaviria (Maggie), SILVANA  Obstetrics and Gynecology  [facility]

## 2025-03-12 ENCOUNTER — INFUSION (OUTPATIENT)
Dept: INFUSION THERAPY | Facility: HOSPITAL | Age: 80
End: 2025-03-12
Attending: PEDIATRICS
Payer: MEDICARE

## 2025-03-12 VITALS
SYSTOLIC BLOOD PRESSURE: 137 MMHG | OXYGEN SATURATION: 96 % | RESPIRATION RATE: 18 BRPM | TEMPERATURE: 98 F | DIASTOLIC BLOOD PRESSURE: 75 MMHG | HEART RATE: 80 BPM

## 2025-03-12 DIAGNOSIS — D50.0 IRON DEFICIENCY ANEMIA DUE TO CHRONIC BLOOD LOSS: Primary | ICD-10-CM

## 2025-03-12 PROCEDURE — 96375 TX/PRO/DX INJ NEW DRUG ADDON: CPT

## 2025-03-12 PROCEDURE — 63600175 PHARM REV CODE 636 W HCPCS: Mod: JZ,TB | Performed by: NURSE PRACTITIONER

## 2025-03-12 PROCEDURE — 96374 THER/PROPH/DIAG INJ IV PUSH: CPT

## 2025-03-12 RX ORDER — METHYLPREDNISOLONE SOD SUCC 125 MG
60 VIAL (EA) INJECTION
Start: 2025-03-18

## 2025-03-12 RX ORDER — DIPHENHYDRAMINE HYDROCHLORIDE 50 MG/ML
50 INJECTION, SOLUTION INTRAMUSCULAR; INTRAVENOUS ONCE AS NEEDED
OUTPATIENT
Start: 2025-03-18

## 2025-03-12 RX ORDER — SODIUM FERRIC GLUCONATE COMPLEX IN SUCROSE 12.5 MG/ML
125 INJECTION INTRAVENOUS ONCE
Start: 2025-03-18 | End: 2025-03-18

## 2025-03-12 RX ORDER — SODIUM CHLORIDE 0.9 % (FLUSH) 0.9 %
10 SYRINGE (ML) INJECTION
OUTPATIENT
Start: 2025-03-18

## 2025-03-12 RX ORDER — METHYLPREDNISOLONE SOD SUCC 125 MG
60 VIAL (EA) INJECTION
Status: COMPLETED | OUTPATIENT
Start: 2025-03-12 | End: 2025-03-12

## 2025-03-12 RX ORDER — SODIUM FERRIC GLUCONATE COMPLEX IN SUCROSE 12.5 MG/ML
125 INJECTION INTRAVENOUS ONCE
Status: COMPLETED | OUTPATIENT
Start: 2025-03-12 | End: 2025-03-12

## 2025-03-12 RX ORDER — EPINEPHRINE 0.3 MG/.3ML
0.3 INJECTION SUBCUTANEOUS ONCE AS NEEDED
OUTPATIENT
Start: 2025-03-18

## 2025-03-12 RX ADMIN — SODIUM FERRIC GLUCONATE COMPLEX IN SUCROSE 125 MG: 12.5 INJECTION INTRAVENOUS at 07:03

## 2025-03-12 RX ADMIN — METHYLPREDNISOLONE SODIUM SUCCINATE 60 MG: 125 INJECTION, POWDER, FOR SOLUTION INTRAMUSCULAR; INTRAVENOUS at 07:03

## 2025-03-12 NOTE — DISCHARGE INSTRUCTIONS
Thank you for allowing me to care for you today,  JOVAN WheelerN, RN    [facility]  [address] [address]  [phone] phone     [phone] fax  Hours of Operation: Monday- Friday 7:00am- 5:30pm  After hours phone  [phone]  Hematology / Oncology Physicians on call      ADITYA Jaimes Dr., Dr., Dr., Dr., Dr., Dr., Dr., Dr., Dr., Dr., Dr., Dr., Dr., Dr., Dr., KATHY Barfield, KATHY Roy, KATHY Bustillos, NP  Please call with any concerns regarding your appointment today.   FALL PREVENTION   Falls often occur due to slipping, tripping or losing your balance. Here are ways to reduce your risk of falling again.   Was there anything that caused your fall that can be fixed, removed or replaced?   Make your home safe by keeping walkways clear of objects you may trip over.   Use non-slip pads under rugs.   Do not walk in poorly lit areas.   Do not stand on chairs or wobbly ladders.   Use caution when reaching overhead or looking upward. This position can cause a loss of balance.   Be sure your shoes fit properly, have non-slip bottoms and are in good condition.   Be cautious when going up and down stairs, curbs, and when walking on uneven sidewalks.   If your balance is poor, consider using a cane or walker.   If your fall was related to alcohol use, stop or limit alcohol intake.   If your fall was related to use of sleeping medicines, talk to your doctor about this. You may need to reduce your dosage at bedtime if you awaken during the night to go to the bathroom.   To reduce the need for nighttime bathroom trips:   Avoid drinking fluids for several hours before going to bed   Empty your bladder before going to bed   Men can keep a urinal at the bedside   © 3487-8985 [employer], [address] [place] [address], [place] [address]. All rights reserved. This information is not intended as a  substitute for professional medical care. Always follow your healthcare professional's instructions.

## 2025-03-12 NOTE — PLAN OF CARE
Problem: Adult Inpatient Plan of Care  Goal: Plan of Care Review  Description: Pt here for ferrlecit 125 ivp #1/8  Outcome: Progressing  Flowsheets (Taken 3/12/2025 0732)  Plan of Care Reviewed With: patient  Goal: Patient-Specific Goal (Individualized)  Description: Pt will tolerate well.   Outcome: Progressing  Flowsheets (Taken 3/12/2025 0732)  Individualized Care Needs: feet elevated  Anxieties, Fears or Concerns: none  Goal: Absence of Hospital-Acquired Illness or Injury  Outcome: Progressing  Intervention: Identify and Manage Fall Risk  Flowsheets (Taken 3/12/2025 0749)  Safety Promotion/Fall Prevention:   assistive device/personal item within reach   Fall Risk reviewed with patient/family   in recliner, wheels locked   medications reviewed   patient expresses understanding of fall risk and prevention  Intervention: Prevent Infection  Flowsheets (Taken 3/12/2025 0749)  Infection Prevention:   environmental surveillance performed   equipment surfaces disinfected   hand hygiene promoted   personal protective equipment utilized  Goal: Optimal Comfort and Wellbeing  Outcome: Progressing  Intervention: Monitor Pain and Promote Comfort  Flowsheets (Taken 3/12/2025 0749)  Pain Management Interventions:   position adjusted   quiet environment facilitated   relaxation techniques promoted   warm blanket provided  Intervention: Provide Person-Centered Care  Flowsheets (Taken 3/12/2025 0749)  Trust Relationship/Rapport:   care explained   reassurance provided   choices provided   thoughts/feelings acknowledged   emotional support provided   empathic listening provided   questions answered   questions encouraged  Goal: Readiness for Transition of Care  Outcome: Progressing     Problem: Anemia  Goal: Anemia Symptom Improvement  Outcome: Progressing  Intervention: Monitor and Manage Anemia  Flowsheets (Taken 3/12/2025 0749)  Safety Promotion/Fall Prevention:   assistive device/personal item within reach   Fall Risk  reviewed with patient/family   in recliner, wheels locked   medications reviewed   patient expresses understanding of fall risk and prevention  Fatigue Management: frequent rest breaks encouraged      negative...

## 2025-03-14 RX ORDER — LEVOTHYROXINE SODIUM 175 UG/1
TABLET ORAL
Qty: 180 TABLET | Refills: 1 | Status: SHIPPED | OUTPATIENT
Start: 2025-03-14

## 2025-03-19 ENCOUNTER — INFUSION (OUTPATIENT)
Dept: INFUSION THERAPY | Facility: HOSPITAL | Age: 80
End: 2025-03-19
Attending: PEDIATRICS
Payer: MEDICARE

## 2025-03-19 VITALS
HEART RATE: 89 BPM | OXYGEN SATURATION: 98 % | SYSTOLIC BLOOD PRESSURE: 149 MMHG | DIASTOLIC BLOOD PRESSURE: 78 MMHG | TEMPERATURE: 97 F | RESPIRATION RATE: 18 BRPM

## 2025-03-19 DIAGNOSIS — D50.0 IRON DEFICIENCY ANEMIA DUE TO CHRONIC BLOOD LOSS: Primary | ICD-10-CM

## 2025-03-19 PROCEDURE — 96375 TX/PRO/DX INJ NEW DRUG ADDON: CPT

## 2025-03-19 PROCEDURE — 63600175 PHARM REV CODE 636 W HCPCS: Performed by: NURSE PRACTITIONER

## 2025-03-19 PROCEDURE — 96374 THER/PROPH/DIAG INJ IV PUSH: CPT

## 2025-03-19 RX ORDER — METHYLPREDNISOLONE SOD SUCC 125 MG
60 VIAL (EA) INJECTION
Start: 2025-03-26

## 2025-03-19 RX ORDER — SODIUM CHLORIDE 0.9 % (FLUSH) 0.9 %
10 SYRINGE (ML) INJECTION
OUTPATIENT
Start: 2025-03-26

## 2025-03-19 RX ORDER — EPINEPHRINE 0.3 MG/.3ML
0.3 INJECTION SUBCUTANEOUS ONCE AS NEEDED
Status: DISCONTINUED | OUTPATIENT
Start: 2025-03-19 | End: 2025-03-19 | Stop reason: HOSPADM

## 2025-03-19 RX ORDER — SODIUM FERRIC GLUCONATE COMPLEX IN SUCROSE 12.5 MG/ML
125 INJECTION INTRAVENOUS ONCE
Start: 2025-03-26 | End: 2025-03-26

## 2025-03-19 RX ORDER — EPINEPHRINE 0.3 MG/.3ML
0.3 INJECTION SUBCUTANEOUS ONCE AS NEEDED
OUTPATIENT
Start: 2025-03-26

## 2025-03-19 RX ORDER — SODIUM FERRIC GLUCONATE COMPLEX IN SUCROSE 12.5 MG/ML
125 INJECTION INTRAVENOUS ONCE
Status: COMPLETED | OUTPATIENT
Start: 2025-03-19 | End: 2025-03-19

## 2025-03-19 RX ORDER — DIPHENHYDRAMINE HYDROCHLORIDE 50 MG/ML
50 INJECTION, SOLUTION INTRAMUSCULAR; INTRAVENOUS ONCE AS NEEDED
OUTPATIENT
Start: 2025-03-26

## 2025-03-19 RX ORDER — DIPHENHYDRAMINE HYDROCHLORIDE 50 MG/ML
50 INJECTION, SOLUTION INTRAMUSCULAR; INTRAVENOUS ONCE AS NEEDED
Status: DISCONTINUED | OUTPATIENT
Start: 2025-03-19 | End: 2025-03-19 | Stop reason: HOSPADM

## 2025-03-19 RX ADMIN — SODIUM FERRIC GLUCONATE COMPLEX IN SUCROSE 125 MG: 12.5 INJECTION INTRAVENOUS at 08:03

## 2025-03-19 RX ADMIN — METHYLPREDNISOLONE SODIUM SUCCINATE 60 MG: 40 INJECTION, POWDER, FOR SOLUTION INTRAMUSCULAR; INTRAVENOUS at 08:03

## 2025-03-19 NOTE — PLAN OF CARE
Discussed plan of care with pt. Addressed any and ongoing concerns. Pt denies    Problem: Adult Inpatient Plan of Care  Goal: Plan of Care Review  Outcome: Progressing  Flowsheets (Taken 3/19/2025 0842)  Plan of Care Reviewed With: patient  Goal: Absence of Hospital-Acquired Illness or Injury  Outcome: Progressing  Intervention: Identify and Manage Fall Risk  Flowsheets (Taken 3/19/2025 0842)  Safety Promotion/Fall Prevention:   room near unit station   in recliner, wheels locked   nonskid shoes/socks when out of bed  Intervention: Prevent Infection  Flowsheets (Taken 3/19/2025 0842)  Infection Prevention:   hand hygiene promoted   equipment surfaces disinfected  Goal: Optimal Comfort and Wellbeing  Outcome: Progressing  Intervention: Monitor Pain and Promote Comfort  Flowsheets (Taken 3/19/2025 0842)  Pain Management Interventions:   quiet environment facilitated   relaxation techniques promoted  Intervention: Provide Person-Centered Care  Flowsheets (Taken 3/19/2025 0842)  Trust Relationship/Rapport:   questions encouraged   care explained   choices provided   reassurance provided   emotional support provided   thoughts/feelings acknowledged   empathic listening provided   questions answered

## 2025-03-26 ENCOUNTER — INFUSION (OUTPATIENT)
Dept: INFUSION THERAPY | Facility: HOSPITAL | Age: 80
End: 2025-03-26
Attending: PEDIATRICS
Payer: MEDICARE

## 2025-03-26 VITALS
SYSTOLIC BLOOD PRESSURE: 129 MMHG | WEIGHT: 232.56 LBS | RESPIRATION RATE: 18 BRPM | OXYGEN SATURATION: 98 % | TEMPERATURE: 98 F | DIASTOLIC BLOOD PRESSURE: 79 MMHG | BODY MASS INDEX: 36.5 KG/M2 | HEART RATE: 84 BPM | HEIGHT: 67 IN

## 2025-03-26 DIAGNOSIS — D50.0 IRON DEFICIENCY ANEMIA DUE TO CHRONIC BLOOD LOSS: Primary | ICD-10-CM

## 2025-03-26 PROCEDURE — 96375 TX/PRO/DX INJ NEW DRUG ADDON: CPT

## 2025-03-26 PROCEDURE — 63600175 PHARM REV CODE 636 W HCPCS: Performed by: NURSE PRACTITIONER

## 2025-03-26 PROCEDURE — 96374 THER/PROPH/DIAG INJ IV PUSH: CPT

## 2025-03-26 RX ORDER — EPINEPHRINE 0.3 MG/.3ML
0.3 INJECTION SUBCUTANEOUS ONCE AS NEEDED
Status: DISCONTINUED | OUTPATIENT
Start: 2025-03-26 | End: 2025-03-26 | Stop reason: HOSPADM

## 2025-03-26 RX ORDER — SODIUM FERRIC GLUCONATE COMPLEX IN SUCROSE 12.5 MG/ML
125 INJECTION INTRAVENOUS ONCE
Status: CANCELLED
Start: 2025-03-26 | End: 2025-03-26

## 2025-03-26 RX ORDER — SODIUM FERRIC GLUCONATE COMPLEX IN SUCROSE 12.5 MG/ML
125 INJECTION INTRAVENOUS ONCE
Status: COMPLETED | OUTPATIENT
Start: 2025-03-26 | End: 2025-03-26

## 2025-03-26 RX ORDER — SODIUM CHLORIDE 0.9 % (FLUSH) 0.9 %
10 SYRINGE (ML) INJECTION
OUTPATIENT
Start: 2025-03-26

## 2025-03-26 RX ORDER — METHYLPREDNISOLONE SOD SUCC 125 MG
60 VIAL (EA) INJECTION
Status: CANCELLED
Start: 2025-03-26

## 2025-03-26 RX ORDER — DIPHENHYDRAMINE HYDROCHLORIDE 50 MG/ML
50 INJECTION, SOLUTION INTRAMUSCULAR; INTRAVENOUS ONCE AS NEEDED
Status: DISCONTINUED | OUTPATIENT
Start: 2025-03-26 | End: 2025-03-26 | Stop reason: HOSPADM

## 2025-03-26 RX ORDER — SODIUM CHLORIDE 0.9 % (FLUSH) 0.9 %
10 SYRINGE (ML) INJECTION
Status: DISCONTINUED | OUTPATIENT
Start: 2025-03-26 | End: 2025-03-26 | Stop reason: HOSPADM

## 2025-03-26 RX ORDER — DIPHENHYDRAMINE HYDROCHLORIDE 50 MG/ML
50 INJECTION, SOLUTION INTRAMUSCULAR; INTRAVENOUS ONCE AS NEEDED
OUTPATIENT
Start: 2025-03-26

## 2025-03-26 RX ORDER — EPINEPHRINE 0.3 MG/.3ML
0.3 INJECTION SUBCUTANEOUS ONCE AS NEEDED
OUTPATIENT
Start: 2025-03-26

## 2025-03-26 RX ORDER — METHYLPREDNISOLONE SOD SUCC 125 MG
60 VIAL (EA) INJECTION
Status: COMPLETED | OUTPATIENT
Start: 2025-03-26 | End: 2025-03-26

## 2025-03-26 RX ADMIN — METHYLPREDNISOLONE SODIUM SUCCINATE 60 MG: 125 INJECTION, POWDER, FOR SOLUTION INTRAMUSCULAR; INTRAVENOUS at 07:03

## 2025-03-26 RX ADMIN — SODIUM FERRIC GLUCONATE COMPLEX IN SUCROSE 125 MG: 12.5 INJECTION INTRAVENOUS at 07:03

## 2025-03-26 NOTE — NURSING
Addressed all patient concerns and questions. Tolerated all medications without complication.   Discharged with future appointments per MD order.

## 2025-03-31 DIAGNOSIS — J45.20 MILD INTERMITTENT ASTHMA WITHOUT COMPLICATION: ICD-10-CM

## 2025-03-31 RX ORDER — BUDESONIDE AND FORMOTEROL FUMARATE DIHYDRATE 160; 4.5 UG/1; UG/1
AEROSOL RESPIRATORY (INHALATION)
Qty: 10.2 G | Refills: 11 | Status: SHIPPED | OUTPATIENT
Start: 2025-03-31

## 2025-03-31 NOTE — TELEPHONE ENCOUNTER
----- Message from Elba sent at 3/31/2025  8:13 AM CDT -----  Contact: Patient, [phone]  Requesting an RX refill or new RX.Is this a refill or new RX: RefillRX name and strength (copy/paste from chart):  budesonide-formoterol 160-4.5 mcg (SYMBICORT) 160-4.5 mcg/actuation HFAAIs this a 30 day or 90 day RX: Pharmacy name and phone # (copy/paste from chart):  [facility] [place] - [address] [facility] [id number] [facility] [address] [id number]: [phone] Fax: [phone] The doctors have asked that we provide their patients with the following 2 reminders -- prescription refills can take up to 72 hours, and a friendly reminder that in the future you can use your [facility] account to request refills: Yes

## 2025-04-09 ENCOUNTER — TELEPHONE (OUTPATIENT)
Dept: INTERNAL MEDICINE | Facility: CLINIC | Age: 80
End: 2025-04-09
Payer: MEDICARE

## 2025-04-11 ENCOUNTER — LAB VISIT (OUTPATIENT)
Dept: LAB | Facility: HOSPITAL | Age: 80
End: 2025-04-11
Attending: PEDIATRICS
Payer: MEDICARE

## 2025-04-11 ENCOUNTER — OFFICE VISIT (OUTPATIENT)
Dept: OTOLARYNGOLOGY | Facility: CLINIC | Age: 80
End: 2025-04-11
Payer: MEDICARE

## 2025-04-11 VITALS — HEIGHT: 68 IN | WEIGHT: 229.06 LBS | BODY MASS INDEX: 34.72 KG/M2

## 2025-04-11 DIAGNOSIS — H61.23 BILATERAL IMPACTED CERUMEN: Primary | ICD-10-CM

## 2025-04-11 DIAGNOSIS — R80.9 TYPE 2 DIABETES MELLITUS WITH MICROALBUMINURIA, WITH LONG-TERM CURRENT USE OF INSULIN: Chronic | ICD-10-CM

## 2025-04-11 DIAGNOSIS — Z79.4 TYPE 2 DIABETES MELLITUS WITH MICROALBUMINURIA, WITH LONG-TERM CURRENT USE OF INSULIN: Chronic | ICD-10-CM

## 2025-04-11 DIAGNOSIS — E11.29 TYPE 2 DIABETES MELLITUS WITH MICROALBUMINURIA, WITH LONG-TERM CURRENT USE OF INSULIN: Chronic | ICD-10-CM

## 2025-04-11 PROCEDURE — 99999 PR PBB SHADOW E&M-EST. PATIENT-LVL III: CPT | Mod: PBBFAC,,, | Performed by: PHYSICIAN ASSISTANT

## 2025-04-11 PROCEDURE — 82570 ASSAY OF URINE CREATININE: CPT

## 2025-04-11 NOTE — PROGRESS NOTES
Subjective:   Cerumen impactions     Patient ID: Erendira Pretty is a 79 y.o. male.    Chief Complaint:  Excessive ear wax     Erendira Pretty is a 79 y.o. male here to see me today for evaluation of a possible wax impaction in the left ear.  He has complaints of hearing loss in the affected ear, but denies pain or drainage.  This has been an issue in the past.  The patient has not been using any sort of ear drop to soften the wax.    HPI  Review of Systems   HENT: Positive for hearing loss. Negative for ear discharge, ear pain and tinnitus.        Objective:     Physical Exam   HENT:   Right Ear: External ear and ear canal normal. Decreased hearing is noted.   Left Ear: External ear and ear canal normal. Decreased hearing is noted.   Bilateral complete cerumen impactions, removal described below       Procedure Note    CHIEF COMPLAINT:  Cerumen Impaction    Description:  The patient was seated in an exam chair.  An ear speculum was placed in the right EAC and was examined under the microscope.  Suction and/or alligator forceps were used to remove a large cerumen impaction.  The tympanic membrane was visualized and was normal in appearance.  The procedure was repeated on the left side in a similar fashion.  The TM was intact and normal on this side as well.  The patient tolerated the procedure well.           Assessment:     1. Bilateral impacted cerumen        Plan:     1.  Cerumen impaction:  Removed today without difficulty.  I would recommend the use of a wax softening drop, either over the counter Debrox or mineral oil, on a weekly basis.  I also instructed the patient to avoid Qtips.  He notes improvement in his hearing today once cerumen removed AU.  RTC as needed.

## 2025-04-12 LAB
ALBUMIN/CREAT UR: 10.3 UG/MG
CREAT UR-MCNC: 68 MG/DL (ref 23–375)
MICROALBUMIN UR-MCNC: 7 UG/ML (ref ?–5000)

## 2025-04-14 ENCOUNTER — LAB VISIT (OUTPATIENT)
Dept: LAB | Facility: HOSPITAL | Age: 80
End: 2025-04-14
Attending: NURSE PRACTITIONER
Payer: MEDICARE

## 2025-04-14 DIAGNOSIS — D50.9 IRON DEFICIENCY ANEMIA, UNSPECIFIED IRON DEFICIENCY ANEMIA TYPE: ICD-10-CM

## 2025-04-14 DIAGNOSIS — D64.9 NORMOCYTIC ANEMIA: ICD-10-CM

## 2025-04-14 LAB
ABSOLUTE EOSINOPHIL (OHS): 0.11 K/UL
ABSOLUTE MONOCYTE (OHS): 0.46 K/UL (ref 0.3–1)
ABSOLUTE NEUTROPHIL COUNT (OHS): 1.85 K/UL (ref 1.8–7.7)
ANION GAP (OHS): 9 MMOL/L (ref 8–16)
BASOPHILS # BLD AUTO: 0.02 K/UL
BASOPHILS NFR BLD AUTO: 0.6 %
BUN SERPL-MCNC: 15 MG/DL (ref 8–23)
CALCIUM SERPL-MCNC: 9.3 MG/DL (ref 8.7–10.5)
CHLORIDE SERPL-SCNC: 106 MMOL/L (ref 95–110)
CO2 SERPL-SCNC: 24 MMOL/L (ref 23–29)
CREAT SERPL-MCNC: 0.9 MG/DL (ref 0.5–1.4)
ERYTHROCYTE [DISTWIDTH] IN BLOOD BY AUTOMATED COUNT: 17 % (ref 11.5–14.5)
FERRITIN SERPL-MCNC: 125.7 NG/ML (ref 20–300)
GFR SERPLBLD CREATININE-BSD FMLA CKD-EPI: >60 ML/MIN/1.73/M2
GLUCOSE SERPL-MCNC: 125 MG/DL (ref 70–110)
HCT VFR BLD AUTO: 44.8 % (ref 40–54)
HGB BLD-MCNC: 14.6 GM/DL (ref 14–18)
IMM GRANULOCYTES # BLD AUTO: 0.01 K/UL (ref 0–0.04)
IMM GRANULOCYTES NFR BLD AUTO: 0.3 % (ref 0–0.5)
IRON SATN MFR SERPL: 22 % (ref 20–50)
IRON SERPL-MCNC: 75 UG/DL (ref 45–160)
LYMPHOCYTES # BLD AUTO: 0.9 K/UL (ref 1–4.8)
MCH RBC QN AUTO: 30.2 PG (ref 27–31)
MCHC RBC AUTO-ENTMCNC: 32.6 G/DL (ref 32–36)
MCV RBC AUTO: 93 FL (ref 82–98)
NUCLEATED RBC (/100WBC) (OHS): 0 /100 WBC
PLATELET # BLD AUTO: 140 K/UL (ref 150–450)
PMV BLD AUTO: 10.4 FL (ref 9.2–12.9)
POTASSIUM SERPL-SCNC: 4.8 MMOL/L (ref 3.5–5.1)
RBC # BLD AUTO: 4.84 M/UL (ref 4.6–6.2)
RELATIVE EOSINOPHIL (OHS): 3.3 %
RELATIVE LYMPHOCYTE (OHS): 26.9 % (ref 18–48)
RELATIVE MONOCYTE (OHS): 13.7 % (ref 4–15)
RELATIVE NEUTROPHIL (OHS): 55.2 % (ref 38–73)
SODIUM SERPL-SCNC: 139 MMOL/L (ref 136–145)
TIBC SERPL-MCNC: 345 UG/DL (ref 250–450)
TRANSFERRIN SERPL-MCNC: 233 MG/DL (ref 200–375)
WBC # BLD AUTO: 3.35 K/UL (ref 3.9–12.7)

## 2025-04-14 PROCEDURE — 85025 COMPLETE CBC W/AUTO DIFF WBC: CPT

## 2025-04-14 PROCEDURE — 82310 ASSAY OF CALCIUM: CPT

## 2025-04-14 PROCEDURE — 36415 COLL VENOUS BLD VENIPUNCTURE: CPT | Mod: PO

## 2025-04-14 PROCEDURE — 82728 ASSAY OF FERRITIN: CPT

## 2025-04-14 PROCEDURE — 84466 ASSAY OF TRANSFERRIN: CPT

## 2025-04-16 ENCOUNTER — OFFICE VISIT (OUTPATIENT)
Dept: INTERNAL MEDICINE | Facility: CLINIC | Age: 80
End: 2025-04-16
Payer: MEDICARE

## 2025-04-16 VITALS
OXYGEN SATURATION: 98 % | WEIGHT: 229.5 LBS | HEIGHT: 68 IN | DIASTOLIC BLOOD PRESSURE: 82 MMHG | HEART RATE: 93 BPM | TEMPERATURE: 97 F | SYSTOLIC BLOOD PRESSURE: 130 MMHG | BODY MASS INDEX: 34.78 KG/M2

## 2025-04-16 DIAGNOSIS — I25.118 CORONARY ARTERY DISEASE WITH EXERTIONAL ANGINA: ICD-10-CM

## 2025-04-16 DIAGNOSIS — N40.1 BENIGN PROSTATIC HYPERPLASIA WITH NOCTURIA: Chronic | ICD-10-CM

## 2025-04-16 DIAGNOSIS — Z79.4 TYPE 2 DIABETES MELLITUS WITH MICROALBUMINURIA, WITH LONG-TERM CURRENT USE OF INSULIN: ICD-10-CM

## 2025-04-16 DIAGNOSIS — E66.812 CLASS 2 SEVERE OBESITY DUE TO EXCESS CALORIES WITH SERIOUS COMORBIDITY AND BODY MASS INDEX (BMI) OF 37.0 TO 37.9 IN ADULT: ICD-10-CM

## 2025-04-16 DIAGNOSIS — I48.0 PAROXYSMAL ATRIAL FIBRILLATION: ICD-10-CM

## 2025-04-16 DIAGNOSIS — D12.6 ADENOMATOUS POLYP OF COLON, UNSPECIFIED PART OF COLON: ICD-10-CM

## 2025-04-16 DIAGNOSIS — T46.6X5A STATIN MYOPATHY: ICD-10-CM

## 2025-04-16 DIAGNOSIS — Z00.00 WELL ADULT EXAM: Primary | ICD-10-CM

## 2025-04-16 DIAGNOSIS — B19.20 COMPENSATED HCV CIRRHOSIS: ICD-10-CM

## 2025-04-16 DIAGNOSIS — D50.0 IRON DEFICIENCY ANEMIA DUE TO CHRONIC BLOOD LOSS: ICD-10-CM

## 2025-04-16 DIAGNOSIS — T45.515S: ICD-10-CM

## 2025-04-16 DIAGNOSIS — J33.9 NASAL POLYPOSIS: ICD-10-CM

## 2025-04-16 DIAGNOSIS — G72.0 STATIN MYOPATHY: ICD-10-CM

## 2025-04-16 DIAGNOSIS — K74.69 COMPENSATED HCV CIRRHOSIS: ICD-10-CM

## 2025-04-16 DIAGNOSIS — Z95.1 S/P CABG X 2: ICD-10-CM

## 2025-04-16 DIAGNOSIS — J45.20 MILD INTERMITTENT ASTHMA WITHOUT COMPLICATION: ICD-10-CM

## 2025-04-16 DIAGNOSIS — I85.10 SECONDARY ESOPHAGEAL VARICES WITHOUT BLEEDING: ICD-10-CM

## 2025-04-16 DIAGNOSIS — K55.1 MESENTERIC ARTERY STENOSIS: ICD-10-CM

## 2025-04-16 DIAGNOSIS — R35.1 BENIGN PROSTATIC HYPERPLASIA WITH NOCTURIA: Chronic | ICD-10-CM

## 2025-04-16 DIAGNOSIS — Z12.5 PROSTATE CANCER SCREENING: ICD-10-CM

## 2025-04-16 DIAGNOSIS — E66.01 CLASS 2 SEVERE OBESITY DUE TO EXCESS CALORIES WITH SERIOUS COMORBIDITY AND BODY MASS INDEX (BMI) OF 37.0 TO 37.9 IN ADULT: ICD-10-CM

## 2025-04-16 DIAGNOSIS — E11.29 TYPE 2 DIABETES MELLITUS WITH MICROALBUMINURIA, WITH LONG-TERM CURRENT USE OF INSULIN: ICD-10-CM

## 2025-04-16 DIAGNOSIS — C22.0 HCC (HEPATOCELLULAR CARCINOMA): ICD-10-CM

## 2025-04-16 DIAGNOSIS — R80.9 TYPE 2 DIABETES MELLITUS WITH MICROALBUMINURIA, WITH LONG-TERM CURRENT USE OF INSULIN: ICD-10-CM

## 2025-04-16 DIAGNOSIS — E03.9 ACQUIRED HYPOTHYROIDISM: Chronic | ICD-10-CM

## 2025-04-16 DIAGNOSIS — G47.33 OSA ON CPAP: Chronic | ICD-10-CM

## 2025-04-16 DIAGNOSIS — K21.00 GASTROESOPHAGEAL REFLUX DISEASE WITH ESOPHAGITIS WITHOUT HEMORRHAGE: ICD-10-CM

## 2025-04-16 DIAGNOSIS — Z95.818 PRESENCE OF WATCHMAN LEFT ATRIAL APPENDAGE CLOSURE DEVICE: ICD-10-CM

## 2025-04-16 DIAGNOSIS — I10 ESSENTIAL HYPERTENSION: Chronic | ICD-10-CM

## 2025-04-16 PROCEDURE — 99999 PR PBB SHADOW E&M-EST. PATIENT-LVL V: CPT | Mod: PBBFAC,,, | Performed by: PEDIATRICS

## 2025-04-16 NOTE — PROGRESS NOTES
Subjective:       Patient ID: Erendira Pretty is a 79 y.o. male.    Chief Complaint: Follow-up    Erendira Pretty is a 78 year old male here for his annual follow up.      PMHx, PSHx, SocHx, and FHx reviewed and discussed with patient.     1) DM: no hyper/hypoglycemic symptoms. Good checks at home, taking Jardiance 25 mg, 44u Lantus, and Metformin BID.   2) HTN: rare BP checks, B/P good, no HTNive symptoms. On beta blocker for esophogeal varicies  3) LIPIDS: following D&E, statin intolerant/myopathy. Repatha changed to leqvio  4) CAD/Atherosclerosis/AF/ sp TAVR: no CP, active and sees cardiology and EPS, only on plavix, has watchman.  5) Hypothyroid: asymptomatic, no swallowing difficulties, no hyper/hypothyroid symptoms. Compliant with levothyroxine.  6) Hepatocellular carcinoma/cirrhosis: followed by hepatology  7) Asthma/DELONTE/asbestos/obesity: followed by Pulmonary, off ICS due to ENT findings  8) GERD: quiet on PPI  9) BPH: symptoms ok on proscar  10) GI bleed due to esophageal varicies/Anemia: now no further bleeding, counts improving, on IV iron, followed by GI and heme.     LABS REVIEWED AND DISCUSSED WITH PATIENT    Review of Systems   Constitutional:  Negative for fever and unexpected weight change.   HENT:  Negative for congestion and rhinorrhea.    Eyes:  Negative for discharge and redness.   Respiratory:  Negative for cough, shortness of breath and wheezing.    Cardiovascular:  Negative for chest pain, palpitations and leg swelling.   Gastrointestinal:  Negative for abdominal pain, constipation, diarrhea and vomiting.   Endocrine: Negative for cold intolerance, heat intolerance, polydipsia, polyphagia and polyuria.   Genitourinary:  Negative for decreased urine volume and difficulty urinating.   Musculoskeletal:  Negative for arthralgias and joint swelling.   Skin:  Negative for rash and wound.   Neurological:  Negative for syncope and headaches.   Psychiatric/Behavioral:  Negative for behavioral problems  and sleep disturbance.        Objective:      Physical Exam  Vitals and nursing note reviewed.   Constitutional:       General: He is not in acute distress.     Appearance: He is well-developed.   Neck:      Thyroid: No thyromegaly.      Vascular: No JVD.   Cardiovascular:      Rate and Rhythm: Normal rate and regular rhythm.      Heart sounds: Murmur: 2/6 systolic at LSB.   Pulmonary:      Effort: Pulmonary effort is normal. No respiratory distress.      Breath sounds: Normal breath sounds. No wheezing or rales.   Abdominal:      General: There is no distension.      Palpations: Abdomen is soft. There is no mass.      Tenderness: There is no abdominal tenderness. There is no guarding.   Musculoskeletal:      Right lower leg: No edema.      Left lower leg: No edema.   Lymphadenopathy:      Cervical: No cervical adenopathy.   Skin:     Capillary Refill: Capillary refill takes less than 2 seconds.      Findings: No rash.   Neurological:      General: No focal deficit present.      Mental Status: He is alert and oriented to person, place, and time.      Cranial Nerves: No cranial nerve deficit.      Coordination: Coordination normal.   Psychiatric:         Mood and Affect: Mood normal.         Behavior: Behavior normal.         Thought Content: Thought content normal.         Judgment: Judgment normal.       Assessment:       1. Well adult exam    2. HCC (hepatocellular carcinoma)    3. Type 2 diabetes mellitus with microalbuminuria, with long-term current use of insulin    4. Secondary esophageal varices without bleeding    5. Statin myopathy    6. S/P CABG x 2    7. Presence of Watchman left atrial appendage closure device    8. Paroxysmal atrial fibrillation    9. DELONTE on CPAP    10. Mild intermittent asthma without complication    11. Nasal polyposis    12. Mesenteric artery stenosis    13. Iron deficiency anemia due to chronic blood loss    14. Gastroesophageal reflux disease with esophagitis without hemorrhage     15. Essential hypertension    16. Coronary artery disease with exertional angina    17. Adenomatous polyp of colon, unspecified part of colon    18. Compensated HCV cirrhosis    19. Class 2 severe obesity due to excess calories with serious comorbidity and body mass index (BMI) of 37.0 to 37.9 in adult    20. Benign prostatic hyperplasia with nocturia    21. Acquired hypothyroidism    22. Anticoagulant causing adverse effect in therapeutic use, sequela    23. Prostate cancer screening        Plan:       Well adult exam    HCC (hepatocellular carcinoma)    Type 2 diabetes mellitus with microalbuminuria, with long-term current use of insulin  -     Comprehensive Metabolic Panel; Future; Expected date: 04/16/2025  -     Lipid Panel; Future; Expected date: 04/16/2025  -     Hemoglobin A1C; Future; Expected date: 04/16/2025  -     Microalbumin/Creatinine Ratio, Urine; Future; Expected date: 04/16/2025    Secondary esophageal varices without bleeding    Statin myopathy    S/P CABG x 2    Presence of Watchman left atrial appendage closure device    Paroxysmal atrial fibrillation    DELONTE on CPAP    Mild intermittent asthma without complication    Nasal polyposis    Mesenteric artery stenosis    Iron deficiency anemia due to chronic blood loss  -     CBC Auto Differential; Future; Expected date: 04/16/2025    Gastroesophageal reflux disease with esophagitis without hemorrhage    Essential hypertension    Coronary artery disease with exertional angina    Adenomatous polyp of colon, unspecified part of colon    Compensated HCV cirrhosis    Class 2 severe obesity due to excess calories with serious comorbidity and body mass index (BMI) of 37.0 to 37.9 in adult    Benign prostatic hyperplasia with nocturia    Acquired hypothyroidism  -     TSH; Future; Expected date: 04/16/2025    Anticoagulant causing adverse effect in therapeutic use, sequela    Prostate cancer screening  -     PSA, Screening; Future; Expected date:  04/16/2025    I d/w pt. At goal for B/P, lipids, and A1c. Maintain meds, subspecialty care, self monitoring D&E, weight moderation. F/U 6 months with labs.

## 2025-04-23 ENCOUNTER — HOSPITAL ENCOUNTER (OUTPATIENT)
Dept: RADIOLOGY | Facility: HOSPITAL | Age: 80
Discharge: HOME | End: 2025-04-23
Attending: INTERNAL MEDICINE
Payer: MEDICARE

## 2025-04-23 ENCOUNTER — RESULTS FOLLOW-UP (OUTPATIENT)
Dept: HEPATOLOGY | Facility: CLINIC | Age: 80
End: 2025-04-23

## 2025-04-23 DIAGNOSIS — C22.0 HCC (HEPATOCELLULAR CARCINOMA): ICD-10-CM

## 2025-04-23 DIAGNOSIS — K74.69 OTHER CIRRHOSIS OF LIVER: ICD-10-CM

## 2025-04-23 PROCEDURE — 74183 MRI ABD W/O CNTR FLWD CNTR: CPT | Mod: TC

## 2025-04-23 PROCEDURE — 25500020 PHARM REV CODE 255: Performed by: INTERNAL MEDICINE

## 2025-04-23 PROCEDURE — A9585 GADOBUTROL INJECTION: HCPCS | Performed by: INTERNAL MEDICINE

## 2025-04-23 PROCEDURE — 74183 MRI ABD W/O CNTR FLWD CNTR: CPT | Mod: 26,,, | Performed by: STUDENT IN AN ORGANIZED HEALTH CARE EDUCATION/TRAINING PROGRAM

## 2025-04-23 RX ORDER — GADOBUTROL 604.72 MG/ML
10 INJECTION INTRAVENOUS
Status: COMPLETED | OUTPATIENT
Start: 2025-04-23 | End: 2025-04-23

## 2025-04-23 RX ADMIN — GADOBUTROL 10 ML: 604.72 INJECTION INTRAVENOUS at 10:04

## 2025-04-24 ENCOUNTER — TELEPHONE (OUTPATIENT)
Dept: HEPATOLOGY | Facility: HOSPITAL | Age: 80
End: 2025-04-24
Payer: MEDICARE

## 2025-04-24 NOTE — TELEPHONE ENCOUNTER
Patient: Erendira Pretty       MRN: [id number]      : 1945     Age: 79 y.o.  [address] [place] [address]      Providers  Hepatologists: Issa Gayle MD    Priority of review: Cancer    Patient Transplant Status: Oncology    Reason for presentation: Reassessment    Clinical Summary: 78 y.o. male here for follow up of the above  Pt previously seen by Dr. Wood.   He has a known dx of HCC that was previously treated with an ablation.     Imaging to be reviewed: MRI    HCC Treatment History: Ablation    ABO: O POS    Platelets:   Lab Results   Component Value Date/Time     (L) 2025 09:05 AM     2025 08:59 AM     Creatinine:   Lab Results   Component Value Date/Time    CREATININE 0.8 2025 09:05 AM     Bilirubin:   Lab Results   Component Value Date/Time    BILITOT 0.6 2025 09:05 AM    BILITOT 0.4 2025 08:59 AM     AFP Last 3 each if available:   Lab Results   Component Value Date/Time    AFP 3.1 2025 09:05 AM    AFP 3.4 2024 09:00 AM    AFP 3.1 2023 07:33 AM    AFP 4.3 2023 10:29 AM       MELD: MELD 3.0: 6 at 2025  9:05 AM  MELD-Na: 6 at 2025  9:05 AM  Calculated from:  Serum Creatinine: 0.8 mg/dL (Using min of 1 mg/dL) at 2025  9:05 AM  Serum Sodium: 139 mmol/L (Using max of 137 mmol/L) at 2025  9:05 AM  Total Bilirubin: 0.6 mg/dL (Using min of 1 mg/dL) at 2025  9:05 AM  Serum Albumin: 3.6 g/dL (Using max of 3.5 g/dL) at 2025  9:05 AM  INR(ratio): 1 at 2025  9:05 AM  Age at listing (hypothetical): 79 years  Sex: Male at 2025  9:05 AM      Plan:     Follow-up Provider:

## 2025-04-28 ENCOUNTER — OFFICE VISIT (OUTPATIENT)
Dept: HEMATOLOGY/ONCOLOGY | Facility: CLINIC | Age: 80
End: 2025-04-28
Payer: MEDICARE

## 2025-04-28 DIAGNOSIS — D64.9 NORMOCYTIC ANEMIA: Primary | ICD-10-CM

## 2025-04-28 DIAGNOSIS — E61.1 IRON DEFICIENCY: ICD-10-CM

## 2025-04-28 DIAGNOSIS — K55.21 AVM (ARTERIOVENOUS MALFORMATION) OF SMALL BOWEL, ACQUIRED WITH HEMORRHAGE: ICD-10-CM

## 2025-04-28 PROCEDURE — 1160F RVW MEDS BY RX/DR IN RCRD: CPT | Mod: CPTII,93,, | Performed by: NURSE PRACTITIONER

## 2025-04-28 PROCEDURE — 1157F ADVNC CARE PLAN IN RCRD: CPT | Mod: CPTII,93,, | Performed by: NURSE PRACTITIONER

## 2025-04-28 PROCEDURE — 98014 SYNCH AUDIO-ONLY EST MOD 30: CPT | Mod: 93,,, | Performed by: NURSE PRACTITIONER

## 2025-04-28 PROCEDURE — 3072F LOW RISK FOR RETINOPATHY: CPT | Mod: CPTII,93,, | Performed by: NURSE PRACTITIONER

## 2025-04-28 PROCEDURE — G2211 COMPLEX E/M VISIT ADD ON: HCPCS | Mod: 93,,, | Performed by: NURSE PRACTITIONER

## 2025-04-28 PROCEDURE — 1159F MED LIST DOCD IN RCRD: CPT | Mod: CPTII,93,, | Performed by: NURSE PRACTITIONER

## 2025-04-28 RX ORDER — MIRABEGRON 50 MG/1
50 TABLET, FILM COATED, EXTENDED RELEASE ORAL DAILY
Qty: 30 TABLET | Refills: 5 | Status: SHIPPED | OUTPATIENT
Start: 2025-04-28 | End: 2025-10-25

## 2025-04-28 NOTE — PROGRESS NOTES
Audio Only Telehealth Visit     The patient location is: home  The chief complaint leading to consultation is: no complaints  Visit type: Virtual visit with audio only (telephone)  Total time spent in medical discussion with patient: 15 minutes  Total time spent on date of the encounter:30 minutes       The reason for the audio only service rather than synchronous audio and video virtual visit was related to technical difficulties or patient preference/necessity.       Each patient to whom I provide medical services by telemedicine is:  (1) informed of the relationship between the physician and patient and the respective role of any other health care provider with respect to management of the patient; and (2) notified that they may decline to receive medical services by telemedicine and may withdraw from such care at any time. Patient verbally consented to receive this service via voice-only telephone call.       HPI: 80 yo male presents to the hematology clinic as a new patient for further evaluation and treatment of iron deficiency anemia.  He has been referred to the clinic by his pcp, Dr. Callaway.  He was seen many years ago in the clinic by Dr. Ramón Westbrook for SEBASTIAN and received IV iron at that time.  Is being followed by hepatology for h/o of hcv cured with cirrhosis and esophageal varices. Liver CA diagnosed x 2.   Has recently reported incident of melena which has resolved.     States that all of his anticoagulants and antiplatelets have been stopped.  Is only taking asa 81 mg PO daily.     States that he has been taking an oral iron supplement for about 15 days now.  He denies an GI upset.       Left leg numbness that started 2/26/2024 about 3 weeks after stopping antiplatelets and anticoagulation.  States it is ok in the morning but when he starts to move around the leg becomes numb.     Family hx of cancer:  Brother: lung cancer  Brother #2: skin cancer  Self: HCC     Drink a few beers a day starting  last week.  Denies cigarette smoking.  Denies illicit drug use.       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     Denies f/c/ns.  Had some unintentional weight loss from December till now - about 20 lbs before stent was placed - due to abdominal pain and diarrhea.       Denies any known abnormal lymphadenopathy.       Interval History:  5/6/2024  Found with SEBASTIAN due to GI blood loss and was given 2 doses of Feraheme with last dose received on 3/26/2024.  With continued anemia - hgb 9.6 mcv 89, saturated iron 5%.  States was hospitalized last Sunday for melena x 1 episode.  EGD done 4/30/2024 - did not find H. Pylori or evidence of bleeding.  Currently being followed by GI.  States has no episodes since Sunday before last.  Only on baby asa now.     Interval History:  7/18/2024 Received Feraheme infusions x 2 with last dose received on 6/3/2024.  On 7/15/2024 discharged from hospital originally admitted for shortness of breath and pain on legs with ambulation that resolves when stopping.  Is back on Xarelto and ASA and will be having watchman procedure done soon.  Stool is dark color.  Patient states that she did receive IV push iron in the hospital.  States he is currently having issues with constipation.  Has taken slowfe in the past and was unable to tolerate.     Interval History:  9/9/2024 Found with recurrent iron deficiency anemia and given feraheme infusions x 2 with last dose received on 7/3/2024.  Presents today to evaluate response.  Ferritin has normalized; however still with saturated iron is 6%. Hgb 10.4 g/dL, mcv 91, platelet count 148.  Leukocytopenia with absolute lymphocytopenia present.  Denies f/c/ns or unintentional weight loss.  Denies any known abnormal lymphadenopathy. Has chronic left shoulder pain and received a steroid injection about 3 weeks ago.     Interval History:  11/13/2024  Found with recurrent SEBASTIAN and given 2 additional doses of IV  Feraheme with last dose received on 9/30/2024.  Presents today to evaluate response. VCS done 7/31/2024 Impression:  Erythematous gastropathy. Poor bowel preparation. No signs of active bleeding. States that he is feeling much better.  Recently had Watchman procedure done and states is feeling well.  Denies any known bleeding.  Currently not anemic with slight iron deficiency.      Interval History:  2/17/2025 At last visit labs were good and he was to continue to eat foods high in iron.  He presents today after 3 months to assess labs for need for additional IV iron therapy.  No longer on Eliquis.  Is currently on plavix.  Slight anemia with hgb 13.5 normocytic, saturated iron 11%. Denies any known abnormal bleeding.  Continues to be followed by hepatology.  Has no complaints today.     Interval History:3/26/2025  4/28/2025 Received 4 doses of Ferrlecit 125 mg IV with last dose received on 3/26/2025.  Presents today to evaluate response.  States that he is feeling pretty good.  With mild anemia.  Iron is repleated.  He denies any known abnormal blood loss.          Assessment and plan:  F/u in 4 months with labs prior in [place].  CBC, CMP, iron studies.  Continue to monitor for any abnormal bleedings or any other s/s of recurrent anemia.        Med Onc Chart Routing      Follow up with physician    Follow up with VASILIY . F/u in 4 months with labs prior - audio only visit   Infusion scheduling note   n/a   Injection scheduling note n/a   Labs   Scheduling:  Preferred lab: [facility]  Lab interval:  cbc, cmp, iron studies priror to next visit   Imaging   N/a   Pharmacy appointment No pharmacy appointment needed      Other referrals       No additional referrals needed  n/a         He will continue to monitor for any abnormal GI bleeding.  We will follow up in 4 months with labs prior in [place] and audio visit only after.  He will continue f/u with hepatology for cirrhosis.        This service was  not originating from a related E/M service provided within the previous 7 days nor will  to an E/M service or procedure within the next 24 hours or my soonest available appointment.  Prevailing standard of care was able to be met in this audio-only visit.

## 2025-04-28 NOTE — TELEPHONE ENCOUNTER
Please see the attached refill request.    Pt is requesting a refill of Myrbetriq 50 mg Tb24 #30. This medication was last prescribed this on 10/17/2024 with 5 refills to [facility].

## 2025-04-29 ENCOUNTER — CONFERENCE (OUTPATIENT)
Dept: TRANSPLANT | Facility: CLINIC | Age: 80
End: 2025-04-29
Payer: MEDICARE

## 2025-05-01 ENCOUNTER — PATIENT MESSAGE (OUTPATIENT)
Dept: HEPATOLOGY | Facility: CLINIC | Age: 80
End: 2025-05-01
Payer: MEDICARE

## 2025-05-01 ENCOUNTER — TELEPHONE (OUTPATIENT)
Dept: HEPATOLOGY | Facility: CLINIC | Age: 80
End: 2025-05-01
Payer: MEDICARE

## 2025-05-01 NOTE — TELEPHONE ENCOUNTER
Called patient to reschedule appointment due to Dr. Gayle will be out of office on 5/6/25.  Left voicemail offering another date for in person or may change to a virtual on the same date.  Also sent a patient portal message.

## 2025-05-02 NOTE — TELEPHONE ENCOUNTER
Called and spoke with patient.  Rescheduled appointment to a more convenient date for an in person visit.  The patient agreed with new date and time and verified the location.

## 2025-05-13 ENCOUNTER — OFFICE VISIT (OUTPATIENT)
Dept: HEPATOLOGY | Facility: CLINIC | Age: 80
End: 2025-05-13
Payer: MEDICARE

## 2025-05-13 VITALS
OXYGEN SATURATION: 99 % | HEIGHT: 67 IN | DIASTOLIC BLOOD PRESSURE: 68 MMHG | BODY MASS INDEX: 35.64 KG/M2 | HEART RATE: 95 BPM | SYSTOLIC BLOOD PRESSURE: 118 MMHG | WEIGHT: 227.06 LBS

## 2025-05-13 DIAGNOSIS — K76.9 LIVER DISEASE, UNSPECIFIED: ICD-10-CM

## 2025-05-13 DIAGNOSIS — C22.0 HCC (HEPATOCELLULAR CARCINOMA): ICD-10-CM

## 2025-05-13 DIAGNOSIS — K74.69 OTHER CIRRHOSIS OF LIVER: Primary | ICD-10-CM

## 2025-05-13 PROCEDURE — 99999 PR PBB SHADOW E&M-EST. PATIENT-LVL V: CPT | Mod: PBBFAC,,, | Performed by: INTERNAL MEDICINE

## 2025-05-16 NOTE — TELEPHONE ENCOUNTER
Patient: Erendira Pretty       MRN: [id number]      : 1945     Age: 79 y.o.  [address] [place] [address]        Providers  Hepatologists: Issa Gayle MD     Priority of review: Cancer     Patient Transplant Status: Oncology     Reason for presentation: Reassessment     Clinical Summary: 78 y.o. male here for follow up of the above  Pt previously seen by Dr. Wood.   He has a known dx of HCC that was previously treated with an ablation.      Imaging to be reviewed: MRI     HCC Treatment History: Ablation     ABO: O POS     Platelets:         Lab Results   Component Value Date/Time      (L) 2025 09:05 AM      2025 08:59 AM      Creatinine:         Lab Results   Component Value Date/Time     CREATININE 0.8 2025 09:05 AM      Bilirubin:         Lab Results   Component Value Date/Time     BILITOT 0.6 2025 09:05 AM     BILITOT 0.4 2025 08:59 AM      AFP Last 3 each if available:         Lab Results   Component Value Date/Time     AFP 3.1 2025 09:05 AM     AFP 3.4 2024 09:00 AM     AFP 3.1 2023 07:33 AM     AFP 4.3 2023 10:29 AM         MELD: MELD 3.0: 6 at 2025  9:05 AM  MELD-Na: 6 at 2025  9:05 AM  Calculated from:  Serum Creatinine: 0.8 mg/dL (Using min of 1 mg/dL) at 2025  9:05 AM  Serum Sodium: 139 mmol/L (Using max of 137 mmol/L) at 2025  9:05 AM  Total Bilirubin: 0.6 mg/dL (Using min of 1 mg/dL) at 2025  9:05 AM  Serum Albumin: 3.6 g/dL (Using max of 3.5 g/dL) at 2025  9:05 AM  INR(ratio): 1 at 2025  9:05 AM  Age at listing (hypothetical): 79 years  Sex: Male at 2025  9:05 AM        Plan: Satisfactory appearance of ablated lesion in right hepatic lobe.  No evidence of residual or recurrence.      Discussion:  Looks good no residual no recurrence.  Stable findings. Continue to follow 3-6 months cross sectional imaging.    Note sent to Dr Gayle's staff to schedule a follow up  imaging     Follow-up Provider:  Dr Gayle / Dr Kolb

## 2025-05-17 NOTE — TELEPHONE ENCOUNTER
----- Message from Latisha Lyons sent at 11/29/2022 11:15 AM CST -----  Contact: Erendira  Type:  RX Refill Request    Who Called:  Erendira  Refill or New Rx: Refill  RX Name and Strength: Ledocetirizine 5 ml   How is the patient currently taking it? (ex. 1XDay): 1XDAY  Is this a 30 day or 90 day RX: 30  Preferred Pharmacy with phone number:   [place], [place] - [address] [facility] [address]  [address] [address] [address]  Phone: [phone] Fax: [phone]  Local or Mail Order: Local  Ordering Provider: Dr. Acosta  Would the patient rather a call back or a response via [facility]? Callback  Best Call Back Number: [phone]  Additional Information:        
Pt requesting refill of Xyzal.  Preferred pharmacy is up to date in [facility].  
No

## 2025-05-19 ENCOUNTER — PATIENT MESSAGE (OUTPATIENT)
Dept: HEPATOLOGY | Facility: CLINIC | Age: 80
End: 2025-05-19
Payer: MEDICARE

## 2025-05-19 ENCOUNTER — TELEPHONE (OUTPATIENT)
Dept: GASTROENTEROLOGY | Facility: CLINIC | Age: 80
End: 2025-05-19
Payer: MEDICARE

## 2025-05-19 DIAGNOSIS — N40.1 BENIGN PROSTATIC HYPERPLASIA WITH NOCTURIA: ICD-10-CM

## 2025-05-19 DIAGNOSIS — R35.1 BENIGN PROSTATIC HYPERPLASIA WITH NOCTURIA: ICD-10-CM

## 2025-05-19 RX ORDER — FINASTERIDE 5 MG/1
5 TABLET, FILM COATED ORAL
Qty: 90 TABLET | Refills: 3 | Status: SHIPPED | OUTPATIENT
Start: 2025-05-19

## 2025-05-19 NOTE — TELEPHONE ENCOUNTER
Refill Decision Note   Erendira Pretty  is requesting a refill authorization.  Brief Assessment and Rationale for Refill:  Approve     Medication Therapy Plan:         Comments:     Note composed:2:06 PM 05/19/2025

## 2025-05-19 NOTE — TELEPHONE ENCOUNTER
No care due was identified.  [facility] Embedded Care Due Messages. Reference number: [id number].   5/19/2025 7:34:40 AM CDT

## 2025-05-19 NOTE — TELEPHONE ENCOUNTER
Spoke with patient regarding his new dates and times in August for his lab and MRI. Patient is aware to come to the office before going to lab to  paper work on the heilo liver.

## 2025-05-26 DIAGNOSIS — R80.9 TYPE 2 DIABETES MELLITUS WITH MICROALBUMINURIA, WITH LONG-TERM CURRENT USE OF INSULIN: Chronic | ICD-10-CM

## 2025-05-26 DIAGNOSIS — Z79.4 TYPE 2 DIABETES MELLITUS WITH MICROALBUMINURIA, WITH LONG-TERM CURRENT USE OF INSULIN: Chronic | ICD-10-CM

## 2025-05-26 DIAGNOSIS — E11.29 TYPE 2 DIABETES MELLITUS WITH MICROALBUMINURIA, WITH LONG-TERM CURRENT USE OF INSULIN: Chronic | ICD-10-CM

## 2025-05-26 RX ORDER — INSULIN GLARGINE 100 [IU]/ML
INJECTION, SOLUTION SUBCUTANEOUS
Qty: 45 ML | Refills: 1 | Status: SHIPPED | OUTPATIENT
Start: 2025-05-26

## 2025-05-26 NOTE — TELEPHONE ENCOUNTER
Refill Authorization Note     Refill Decision Note   Erendira Pretty  is requesting a refill authorization.  Brief Assessment and Rationale for Refill:  Approve     Medication Therapy Plan:       Medication Reconciliation Completed: No   Comments:     No Care Gaps recommended.     Note composed:4:34 PM 05/26/2025

## 2025-05-26 NOTE — TELEPHONE ENCOUNTER
No care due was identified.  [facility] Embedded Care Due Messages. Reference number: [id number].   5/26/2025 7:32:58 AM CDT

## 2025-06-18 ENCOUNTER — OFFICE VISIT (OUTPATIENT)
Dept: CARDIOLOGY | Facility: CLINIC | Age: 80
End: 2025-06-18
Payer: MEDICARE

## 2025-06-18 VITALS
WEIGHT: 224.88 LBS | HEART RATE: 80 BPM | DIASTOLIC BLOOD PRESSURE: 74 MMHG | SYSTOLIC BLOOD PRESSURE: 118 MMHG | HEIGHT: 67 IN | OXYGEN SATURATION: 98 % | BODY MASS INDEX: 35.29 KG/M2

## 2025-06-18 DIAGNOSIS — Z95.818 PRESENCE OF WATCHMAN LEFT ATRIAL APPENDAGE CLOSURE DEVICE: ICD-10-CM

## 2025-06-18 DIAGNOSIS — I35.0 NONRHEUMATIC AORTIC VALVE STENOSIS: ICD-10-CM

## 2025-06-18 DIAGNOSIS — E78.2 MIXED HYPERLIPIDEMIA: ICD-10-CM

## 2025-06-18 DIAGNOSIS — I70.0 AORTIC ATHEROSCLEROSIS: ICD-10-CM

## 2025-06-18 DIAGNOSIS — Z95.1 HX OF CABG: Primary | ICD-10-CM

## 2025-06-18 DIAGNOSIS — I25.118 CORONARY ARTERY DISEASE WITH EXERTIONAL ANGINA: ICD-10-CM

## 2025-06-18 DIAGNOSIS — G47.33 OSA ON CPAP: Chronic | ICD-10-CM

## 2025-06-18 DIAGNOSIS — I48.0 PAROXYSMAL ATRIAL FIBRILLATION: ICD-10-CM

## 2025-06-18 DIAGNOSIS — I10 ESSENTIAL HYPERTENSION: Chronic | ICD-10-CM

## 2025-06-18 DIAGNOSIS — I49.3 PVC (PREMATURE VENTRICULAR CONTRACTION): ICD-10-CM

## 2025-06-18 PROCEDURE — 99214 OFFICE O/P EST MOD 30 MIN: CPT | Mod: S$GLB,,, | Performed by: INTERNAL MEDICINE

## 2025-06-18 PROCEDURE — 1157F ADVNC CARE PLAN IN RCRD: CPT | Mod: CPTII,S$GLB,, | Performed by: INTERNAL MEDICINE

## 2025-06-18 PROCEDURE — 1159F MED LIST DOCD IN RCRD: CPT | Mod: CPTII,S$GLB,, | Performed by: INTERNAL MEDICINE

## 2025-06-18 PROCEDURE — 3078F DIAST BP <80 MM HG: CPT | Mod: CPTII,S$GLB,, | Performed by: INTERNAL MEDICINE

## 2025-06-18 PROCEDURE — 99999 PR PBB SHADOW E&M-EST. PATIENT-LVL IV: CPT | Mod: PBBFAC,,, | Performed by: INTERNAL MEDICINE

## 2025-06-18 PROCEDURE — 3072F LOW RISK FOR RETINOPATHY: CPT | Mod: CPTII,S$GLB,, | Performed by: INTERNAL MEDICINE

## 2025-06-18 PROCEDURE — 3074F SYST BP LT 130 MM HG: CPT | Mod: CPTII,S$GLB,, | Performed by: INTERNAL MEDICINE

## 2025-06-18 NOTE — PROGRESS NOTES
Subjective:   Patient ID:  Erendira Pretty is a 80 y.o. male who presents for follow-up of No chief complaint on file.  2-25  Pt did see [facility]- no criteria for TAVR yet.  Echo mod-severe AS  Patient denies CP, angina or anginal equivalent since last visit    Today  Patient denies CP, angina or anginal equivalent.    Hypertension  This is a chronic problem. The current episode started more than 1 year ago. The problem has been gradually improving since onset. The problem is controlled. Pertinent negatives include no chest pain, palpitations or shortness of breath. Past treatments include ACE inhibitors, beta blockers and calcium channel blockers. The current treatment provides moderate improvement. There are no compliance problems.  Hypertensive end-organ damage includes CAD/MI.   Coronary Artery Disease  Presents for follow-up visit. Pertinent negatives include no chest pain, chest pressure, chest tightness, dizziness, leg swelling, muscle weakness, palpitations, shortness of breath or weight gain. The symptoms have been stable. Compliance with diet is variable. Compliance with exercise is variable. Compliance with medications is good.   Hyperlipidemia  This is a chronic problem. The current episode started more than 1 year ago. The problem is controlled. Pertinent negatives include no chest pain or shortness of breath. Treatments tried: repatha. The current treatment provides moderate improvement of lipids. There are no compliance problems.        Review of Systems   Constitutional: Negative. Negative for weight gain.   HENT: Negative.     Eyes: Negative.    Cardiovascular: Negative.  Negative for chest pain, leg swelling and palpitations.   Respiratory: Negative.  Negative for chest tightness and shortness of breath.    Endocrine: Negative.    Hematologic/Lymphatic: Negative.    Skin: Negative.    Musculoskeletal:  Negative for muscle weakness.   Gastrointestinal: Negative.    Genitourinary:  Negative.    Neurological: Negative.  Negative for dizziness.   Psychiatric/Behavioral: Negative.     Allergic/Immunologic: Negative.    All other systems reviewed and are negative.    Family History   Problem Relation Name Age of Onset    Heart disease Mother      Heart disease Father      Heart disease Brother      Colon cancer Neg Hx       Past Medical History:   Diagnosis Date    Anticoagulant long-term use     Aortic stenosis     Asthma     Benign prostatic hyperplasia with nocturia     Bilateral carotid artery stenosis 07/21/2021     CAROTID BILATERAL 07/14/2021 IMPRESSION: Atherosclerosis with suspected stenosis greater than 50% at the right proximal ICA visually. Velocities are discordant and appear improved from prior. Atherosclerosis on the left with no evidence of flow-limiting stenosis greater than 50%.  FINDINGS: Right: Internal Carotid Artery (ICA): Peak systolic velocity 118 cm/sec. 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     Niacin preparations Other (See Comments)     Causes broken blood  vessels and bruises at 4 times normal dose.    Statins-hmg-coa reductase inhibitors Other (See Comments)     Statins cause intolerable myalgias.    Iodinated contrast media Hives     Tachycardia    Omeprazole Hives and Itching    Prilosec [omeprazole magnesium] Hives and Itching       Objective:     Physical Exam  Vitals and nursing note reviewed.   Constitutional:       Appearance: He is well-developed.   HENT:      Head: Normocephalic and atraumatic.   Eyes:      Conjunctiva/sclera: Conjunctivae normal.      Pupils: Pupils are equal, round, and reactive to light.   Cardiovascular:      Rate and Rhythm: Normal rate and regular rhythm.      Pulses: Intact distal pulses.      Heart sounds: Normal heart sounds.   Pulmonary:      Effort: Pulmonary effort is normal.      Breath sounds: Normal breath sounds.   Abdominal:      General: Bowel sounds are normal.      Palpations: Abdomen is soft.   Musculoskeletal:      Cervical back: Normal range of motion and neck supple.   Skin:     General: Skin is warm and dry.   Neurological:      Mental Status: He is alert and oriented to person, place, and time.         Assessment:     1. Hx of CABG    2. Presence of Watchman left atrial appendage closure device    3. Paroxysmal atrial fibrillation    4. PVC (premature ventricular contraction)    5. Mixed hyperlipidemia    6. Nonrheumatic aortic valve stenosis    7. Essential hypertension    8. Coronary artery disease with exertional angina    9. Aortic atherosclerosis    10. DELONTE on CPAP        Plan:     Hx of CABG    Presence of Watchman left atrial appendage closure device    Paroxysmal atrial fibrillation    PVC (premature ventricular contraction)    Mixed hyperlipidemia    Nonrheumatic aortic valve stenosis    Essential hypertension    Coronary artery disease with exertional angina    Aortic atherosclerosis    DELONTE on CPAP    Continue lisinopril , norvasc , coreg- HTN/PAF  continue repatha - HLP  Watchman- PAF         [1]   Social  History  Socioeconomic History    Marital status:      Spouse name: YAEL    Number of children: 2   Tobacco Use    Smoking status: Former     Current packs/day: 0.00     Average packs/day: 0.5 packs/day for 4.0 years (2.0 ttl pk-yrs)     Types: Cigarettes     Start date: 1968     Quit date: 1972     Years since quittin.0    Smokeless tobacco: Former     Types: Chew     Quit date: 1976   Substance and Sexual Activity    Alcohol use: Yes     Alcohol/week: 16.0 standard drinks of alcohol     Types: 16 Cans of beer per week    Drug use: No    Sexual activity: Yes     Partners: Female   Social History Narrative    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   [2]   Current Outpatient Medications on File Prior to Visit   Medication Sig Dispense Refill    ACCU-CHEK GRACE PLUS METER Misc AS DIRECTED 1 each 0    albuterol (PROVENTIL HFA) 90 mcg/actuation inhaler Inhale 2 puffs into the lungs every 6 (six) hours as needed for Wheezing. Rescue      amLODIPine (NORVASC) 5 MG tablet Take 1 tablet (5 mg total) by mouth once daily. 90 tablet 3    blood sugar diagnostic (ACCU-CHEK GRACE PLUS TEST STRP) Strp TEST THREE TIMES A  strip 11    budesonide-formoterol 160-4.5 mcg (SYMBICORT) 160-4.5 mcg/actuation HFAA INHALE 2 PUFFS INTO THE LUNGS TWO TIMES A DAY. 10.2 g 11    carvediloL (COREG) 3.125 MG tablet TAKE 1 TABLET BY MOUTH TWICE DAILY with a meal 60 tablet 6    cimetidine (TAGAMET) 300 MG tablet Take one tablet by mouth on 2024 at 4:15 pm, one tablet on 2024 at 10:15 pm, and then one tablet on 2024 at 5:15 am for pre-procedure 3 tablet 0    clopidogreL (PLAVIX) 75 mg tablet Take 1 tablet (75 mg total) by mouth once daily. 90 tablet 3    empagliflozin (JARDIANCE) 25 mg tablet Take 1 tablet (25 mg total) by mouth once daily. 90 tablet 3    famotidine (PEPCID) 40 MG tablet  "Take 40 mg by mouth once daily.      finasteride (PROSCAR) 5 mg tablet TAKE 1 TABLET BY MOUTH once daily 90 tablet 3    furosemide (LASIX) 40 MG tablet Take 1 tablet (40 mg total) by mouth once daily. 90 tablet 3    krill oil 500 mg Cap Take 1 capsule by mouth Daily.      lancets (ACCU-CHEK FASTCLIX LANCET DRUM) Misc TEST TWO TIMES A  each 5    LANTUS SOLOSTAR U-100 INSULIN 100 unit/mL (3 mL) InPn pen *REFRIGERATE* INJECT 44 UNITS INTO THE SKIN EVERY EVENING 45 mL 1    levothyroxine (SYNTHROID, LEVOTHROID) 175 MCG tablet TAKE 2 TABLETS BY MOUTH before breakfast 180 tablet 1    lisinopriL 10 MG tablet Take 1 tablet (10 mg total) by mouth once daily. 90 tablet 3    metFORMIN (GLUCOPHAGE-XR) 500 MG ER 24hr tablet Take 1 tablet (500 mg total) by mouth 2 (two) times daily with meals. 180 tablet 3    mirabegron (MYRBETRIQ) 50 mg Tb24 Take 1 tablet (50 mg total) by mouth once daily. 30 tablet 5    montelukast (SINGULAIR) 10 mg tablet Take 1 tablet (10 mg total) by mouth once daily. 90 tablet 3    multivitamin (THERAGRAN) per tablet Take 1 tablet by mouth once daily. Centrum silver      pen needle, diabetic (BD ULTRA-FINE MINI PEN NEEDLE) 31 gauge x 3/16" Ndle USE AS DIRECTED 100 each 11    RABEprazole (ACIPHEX) 20 mg tablet Take 1 tablet (20 mg total) by mouth 2 (two) times daily. 180 tablet 3    REPATHA SURECLICK 140 mg/mL PnIj INJECT 140mg subcutaneously every 14 days 2 mL 11    sildenafiL (VIAGRA) 100 MG tablet Take 1 tablet (100 mg total) by mouth daily as needed for Erectile Dysfunction. 10 tablet 11    diphenhydrAMINE (BENADRYL) 50 MG capsule Take one tablet by mouth on 11/7/2024 at 4:15 pm, one tablet on 11/7/2024 at 10:15 pm, and then one tablet on 11/8/2024 at 5:15 am for pre-procedure (Patient not taking: Reported on 6/18/2025) 3 capsule 0     Current Facility-Administered Medications on File Prior to Visit   Medication Dose Route Frequency Provider Last Rate Last Admin    lactated ringers infusion   " Intravenous Continuous Omaira Theodore MD   New Bag at 09/09/19 1119

## 2025-06-26 DIAGNOSIS — E11.22 TYPE 2 DIABETES MELLITUS WITH CHRONIC KIDNEY DISEASE, WITH LONG-TERM CURRENT USE OF INSULIN, UNSPECIFIED CKD STAGE: Chronic | ICD-10-CM

## 2025-06-26 DIAGNOSIS — Z79.4 TYPE 2 DIABETES MELLITUS WITH CHRONIC KIDNEY DISEASE, WITH LONG-TERM CURRENT USE OF INSULIN, UNSPECIFIED CKD STAGE: Chronic | ICD-10-CM

## 2025-06-26 NOTE — TELEPHONE ENCOUNTER
Contacted pt. Pt states the jardiance was not showing up on the chart, but it was showing on our side.   Pt requesting a refill on medication .

## 2025-06-26 NOTE — TELEPHONE ENCOUNTER
No care due was identified.  [facility] Embedded Care Due Messages. Reference number: [id number].   6/26/2025 2:55:22 PM CDT

## 2025-06-26 NOTE — TELEPHONE ENCOUNTER
Copied from CRM #[id number]. Topic: Medications - Medication Refill  >> Jun 26, 2025  2:33 PM Priscilla wrote:  Type:  RX Refill Request    Who Called: Erendira  Refill or New Rx: refill   RX Name and Strength: Jardiance 25mg  How is the patient currently taking it? (ex. 1XDay):daily   Is this a 30 day or 90 day RX: 30  Preferred Pharmacy with phone number:  [facility] [address] [place] [address]  [address] [address] [address]  Phone: [phone] Fax: [phone]     Local or Mail Order:local   Ordering Provider: Dr Callaway   Would the patient rather a call back or a response via [facility]?  Call back   Best Call Back Number: [phone]    Additional Information: The caller would like for the medication to be put back on his chart.    Thanks,  SJ

## 2025-07-31 RX ORDER — AMLODIPINE BESYLATE 5 MG/1
5 TABLET ORAL
Qty: 90 TABLET | Refills: 3 | Status: SHIPPED | OUTPATIENT
Start: 2025-07-31

## 2025-08-04 ENCOUNTER — HOSPITAL ENCOUNTER (OUTPATIENT)
Dept: RADIOLOGY | Facility: HOSPITAL | Age: 80
Discharge: HOME | End: 2025-08-04
Attending: INTERNAL MEDICINE
Payer: MEDICARE

## 2025-08-04 DIAGNOSIS — K76.9 LIVER DISEASE, UNSPECIFIED: ICD-10-CM

## 2025-08-04 DIAGNOSIS — C22.0 HCC (HEPATOCELLULAR CARCINOMA): ICD-10-CM

## 2025-08-04 PROCEDURE — 74183 MRI ABD W/O CNTR FLWD CNTR: CPT | Mod: TC

## 2025-08-04 PROCEDURE — A9585 GADOBUTROL INJECTION: HCPCS | Performed by: INTERNAL MEDICINE

## 2025-08-04 PROCEDURE — 74183 MRI ABD W/O CNTR FLWD CNTR: CPT | Mod: 26,,, | Performed by: RADIOLOGY

## 2025-08-04 PROCEDURE — 25500020 PHARM REV CODE 255: Performed by: INTERNAL MEDICINE

## 2025-08-04 RX ORDER — GADOBUTROL 604.72 MG/ML
10 INJECTION INTRAVENOUS
Status: COMPLETED | OUTPATIENT
Start: 2025-08-04 | End: 2025-08-04

## 2025-08-04 RX ADMIN — GADOBUTROL 10 ML: 604.72 INJECTION INTRAVENOUS at 09:08

## 2025-08-06 DIAGNOSIS — I48.0 PAROXYSMAL ATRIAL FIBRILLATION: ICD-10-CM

## 2025-08-07 ENCOUNTER — OFFICE VISIT (OUTPATIENT)
Dept: SLEEP MEDICINE | Facility: CLINIC | Age: 80
End: 2025-08-07
Attending: INTERNAL MEDICINE
Payer: MEDICARE

## 2025-08-07 ENCOUNTER — CLINICAL SUPPORT (OUTPATIENT)
Dept: PULMONOLOGY | Facility: CLINIC | Age: 80
End: 2025-08-07
Attending: INTERNAL MEDICINE
Payer: MEDICARE

## 2025-08-07 ENCOUNTER — HOSPITAL ENCOUNTER (OUTPATIENT)
Dept: RADIOLOGY | Facility: HOSPITAL | Age: 80
Discharge: HOME | End: 2025-08-07
Attending: INTERNAL MEDICINE
Payer: MEDICARE

## 2025-08-07 VITALS
HEIGHT: 67 IN | HEART RATE: 85 BPM | BODY MASS INDEX: 36.26 KG/M2 | DIASTOLIC BLOOD PRESSURE: 65 MMHG | WEIGHT: 231 LBS | OXYGEN SATURATION: 98 % | RESPIRATION RATE: 21 BRPM | SYSTOLIC BLOOD PRESSURE: 120 MMHG

## 2025-08-07 DIAGNOSIS — G47.33 OSA ON CPAP: Chronic | ICD-10-CM

## 2025-08-07 DIAGNOSIS — J45.20 MILD INTERMITTENT ASTHMA WITHOUT COMPLICATION: Primary | ICD-10-CM

## 2025-08-07 DIAGNOSIS — J45.20 MILD INTERMITTENT ASTHMA WITHOUT COMPLICATION: ICD-10-CM

## 2025-08-07 DIAGNOSIS — E66.01 CLASS 2 SEVERE OBESITY DUE TO EXCESS CALORIES WITH SERIOUS COMORBIDITY AND BODY MASS INDEX (BMI) OF 37.0 TO 37.9 IN ADULT: ICD-10-CM

## 2025-08-07 DIAGNOSIS — E78.2 MIXED HYPERLIPIDEMIA: ICD-10-CM

## 2025-08-07 DIAGNOSIS — E66.812 CLASS 2 SEVERE OBESITY DUE TO EXCESS CALORIES WITH SERIOUS COMORBIDITY AND BODY MASS INDEX (BMI) OF 37.0 TO 37.9 IN ADULT: ICD-10-CM

## 2025-08-07 DIAGNOSIS — J32.4 CHRONIC PANSINUSITIS: ICD-10-CM

## 2025-08-07 DIAGNOSIS — I10 ESSENTIAL HYPERTENSION: Chronic | ICD-10-CM

## 2025-08-07 LAB
BRPFT: NORMAL
FEF 25 75 LLN: 0.73
FEF 25 75 PRE REF: 88.5 %
FEF 25 75 REF: 1.91
FEV1 FVC LLN: 61
FEV1 FVC PRE REF: 98.4 %
FEV1 FVC REF: 75
FEV1 LLN: 1.83
FEV1 PRE REF: 91.7 %
FEV1 REF: 2.64
FVC LLN: 2.55
FVC PRE REF: 92.6 %
FVC REF: 3.53
PEF LLN: 4.57
PEF PRE REF: 123.9 %
PEF REF: 6.7
PRE FEF 25 75: 1.69 L/S (ref 0.73–3.65)
PRE FET 100: 8.78 SEC
PRE FEV1 FVC: 74.04 % (ref 60.53–88.6)
PRE FEV1: 2.42 L (ref 1.83–3.38)
PRE FVC: 3.26 L (ref 2.55–4.52)
PRE PEF: 8.29 L/S (ref 4.57–8.82)

## 2025-08-07 PROCEDURE — 99999 PR PBB SHADOW E&M-EST. PATIENT-LVL V: CPT | Mod: PBBFAC,,, | Performed by: INTERNAL MEDICINE

## 2025-08-07 PROCEDURE — 71046 X-RAY EXAM CHEST 2 VIEWS: CPT | Mod: TC

## 2025-08-07 PROCEDURE — 71046 X-RAY EXAM CHEST 2 VIEWS: CPT | Mod: 26,,, | Performed by: RADIOLOGY

## 2025-08-07 RX ORDER — BUDESONIDE AND FORMOTEROL FUMARATE DIHYDRATE 160; 4.5 UG/1; UG/1
AEROSOL RESPIRATORY (INHALATION)
Qty: 10.2 G | Refills: 11 | Status: SHIPPED | OUTPATIENT
Start: 2025-08-07

## 2025-08-07 RX ORDER — MONTELUKAST SODIUM 10 MG/1
10 TABLET ORAL DAILY
Qty: 90 TABLET | Refills: 3 | Status: SHIPPED | OUTPATIENT
Start: 2025-08-07 | End: 2026-08-07

## 2025-08-07 RX ORDER — CLOPIDOGREL BISULFATE 75 MG/1
75 TABLET ORAL
Qty: 90 TABLET | Refills: 3 | Status: SHIPPED | OUTPATIENT
Start: 2025-08-07

## 2025-08-07 RX ORDER — ALBUTEROL SULFATE 90 UG/1
2 INHALANT RESPIRATORY (INHALATION) EVERY 6 HOURS PRN
Qty: 18 G | Refills: 3 | Status: SHIPPED | OUTPATIENT
Start: 2025-08-07

## 2025-08-07 NOTE — PROGRESS NOTES
Subjective:       Patient ID: Erendira Pretty is a 80 y.o. male.         Immunization History   Administered Date(s) Administered    COVID-19, MRNA, LN-S, PF (Pfizer) (Purple Cap) 2021, 2021, 2021    COVID-19, mRNA, LNP-S, PF (Moderna) Ages 12+ 2023    COVID-19, mRNA, LNP-S, bivalent booster, PF (PFIZER OMICRON) 10/03/2022    Hepatitis A 2003, 2004    Hepatitis A, Pediatric/Adolescent, 2 Dose 2003, 2004    Hepatitis B 2003, 2003, 2004, 2014, 2014    Hepatitis B, Adult 2014, 2014, 10/20/2014    Hepatitis B, Pediatric/Adolescent 2003, 2003, 2004    Influenza 2004, 10/17/2007, 10/20/2008, 10/07/2009, 10/20/2010    Influenza - Quadrivalent - PF *Preferred* (6 months and older) 2004    Influenza - Trivalent - Afluria, Fluzone MDV 2004, 10/17/2007, 10/20/2008, 10/07/2009, 10/20/2010, 2012    Influenza - Trivalent - Fluad - Adjuvanted - PF (65 years and older 2018, 2019, 10/03/2024    Influenza - Trivalent - Fluzone High Dose - PF (65 years and older) 10/03/2011, 10/29/2013, 10/21/2014, 2015, 11/10/2016, 10/18/2017, 2018, 10/01/2020, 2021, 2022, 10/03/2023    Influenza A (H1N1) 2009 Monovalent - IM - PF 2009    Influenza Split 2012    Pneumococcal Conjugate - 13 Valent 2015    Pneumococcal Polysaccharide - 23 Valent 2014    Rsv, Bivalent, Rsvpref (Abrysvo) 10/15/2024    Tdap 2014    Zoster 2012    Zoster Recombinant 2018, 2019, 2019     Social History     Tobacco Use   Smoking Status Former    Current packs/day: 0.00    Average packs/day: 0.5 packs/day for 4.0 years (2.0 ttl pk-yrs)    Types: Cigarettes    Start date: 1968    Quit date: 1972    Years since quittin.1   Smokeless Tobacco Former    Types: Chew    Quit date: 1976      Asthma Control Test  In the past 4  weeks, how much of  the time did your asthma keep you from getting as much done at work, school or at home?: None of the time  During the past 4 weeks, how often have you had shortness of breath?: Not at all  During the past 4 weeks, how often did your asthma symptoms (wheezing, couging, shortness of breath, chest tightness or pain) wake you up at night or earlier than usual in the morning?: Not at all  During the past 4 weeks, how often have you used your rescue inhaler or nebulizer medication (such as albuterol)?: Not at all  How would you rate your asthma control during the past 4 weeks?: Completely controlled  If your score is 19 or less, your asthma may not be under control: 25            8/7/2025   EPWORTH SLEEPINESS SCALE   Sitting and reading 0   Watching TV 3   Sitting, inactive in a public place (e.g. a theatre or a meeting) 0   As a passenger in a car for an hour without a break 0   Lying down to rest in the afternoon when circumstances permit 0   Sitting and talking to someone 0   Sitting quietly after a lunch without alcohol 1   In a car, while stopped for a few minutes in traffic 0   Total score 4        Asthma Control Test  In the past 4  weeks, how much of the time did your asthma keep you from getting as much done at work, school or at home?: None of the time  During the past 4 weeks, how often have you had shortness of breath?: Not at all  During the past 4 weeks, how often did your asthma symptoms (wheezing, couging, shortness of breath, chest tightness or pain) wake you up at night or earlier than usual in the morning?: Not at all  During the past 4 weeks, how often have you used your rescue inhaler or nebulizer medication (such as albuterol)?: Not at all  How would you rate your asthma control during the past 4 weeks?: Completely controlled  If your score is 19 or less, your asthma may not be under control: 25           MMRC Dyspnea Scale (4 is worst)     [x] MMRC 0: Dyspneic on strenuous excercise (0 points)    []  MMRC 1: Dyspneic on walking a slight hill (0 points)    [] MMRC 2: Dyspneic on walking level ground; must stop occasionally due to breathlessness (1 point)    [] MMRC 3: Must stop for breathlessness after walking 100 yards or after a few minutes (2 points)    [] MMRC 4: Cannot leave house; breathless on dressing/undressing (3 points)        Chief Complaint: Mild intermittent asthma without complication    Mr Maria De Jesus Krishna is 76y.o.   DELONTE on CPAP, Chr wheezy asthmatic bronchitis, Chr nasal congestion  No interval exacerbations  immunizations current  Has no SOB, no cough, congestion  Lingering issue with leg muscle aches: vascular studies normal  LV 04/28/2021  Has new CPAP machine: Dream station 2  Here to review results  1. CPAP Download: CPAP 11 cm, Usage > 4 hrs was 100%  2. Spirometry was Normal  3. CXR Normal  Average AHI 2.5  Asthma score 25     Using nebuliser  Currently on SYMBICORT: refills sent.  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  CPAp 11 cm  Usage > 4 hrs was 3.8  Usage > 4 hrs was 92.2%  CXR reviewed  [place] 8      08/21/2023  Followup   Recent cough, congestion, green sputum  Recent burning  from neibhour  CPAP data reviewed  CPAP 11 cmwp  No fever  On SYMBICORT and Albuterol   Seen ENT recently   Occasional afternoon hoarseness  Rinses mouth     11/10/2023  Follow up  Last visit 08/21/2023  Cough, sinus fullness, drainage  Green  sometimes bloody discharge  No fever  Similar illness last year    FeNo 28  Adherent with CPAP: missed 4 days but adherent  Spirometry Normal    05/10/2024   Followup  Last visit 11/10/2023  No recent exacebations  Recent hospital admissions: GIB  A fib on Xarelto  No cough, No wheezing, No SOB  Asks for new CPAP  Current heater not working      08/01/2024  Followup  Recent hosptalization  Reviewed Download  CPAP 11  "cmWP  Asthma stable  No Cough, No SOB    08/07/2025  followup  Reviewed x-ray and spirometry fractional exhaled nitric oxide   Doing well   No recent respiratory issues  No cough, No wheezing, no fever  Normal Spirometry  Download reviewed        Asthma  There is no cough, shortness of breath, sputum production or wheezing. Pertinent negatives include no headaches, orthopnea or postnasal drip. His past medical history is significant for asthma.               Review of Systems   Constitutional:  Negative for fatigue.   HENT:  Negative for postnasal drip, sinus pressure, voice change and congestion.    Eyes: Negative.    Respiratory:  Negative for snoring, cough, sputum production, shortness of breath, wheezing, orthopnea, asthma nighttime symptoms, pleurisy, dyspnea on extertion, use of rescue inhaler, somnolence and Paroxysmal Nocturnal Dyspnea.    Cardiovascular:  Negative for leg swelling.   Genitourinary: Negative.    Endocrine: endocrine negative    Musculoskeletal: Negative.    Skin: Negative.    Gastrointestinal: Negative.    Neurological:  Negative for headaches.   Psychiatric/Behavioral: Negative.     All other systems reviewed and are negative.      Objective:       Vitals:    08/07/25 1022   BP: 120/65   Pulse: 85   Resp: (!) 21   SpO2: 98%   Weight: 104.8 kg (231 lb)   Height: 5' 7" (1.702 m)                 Physical Exam   Constitutional: He is oriented to person, place, and time. He appears well-developed and well-nourished. He is obese.   HENT:   Head: Normocephalic.   Nose: No mucosal edema.   Mouth/Throat: Oropharynx is clear and moist. Mallampati Score: III.   Cardiovascular: Normal rate, regular rhythm and intact distal pulses.   No murmur heard.  Pulmonary/Chest: Normal expansion, symmetric chest wall expansion, effort normal and breath sounds normal. He has no decreased breath sounds. He has no rhonchi. He has no rales.   Abdominal: Soft. Bowel sounds are normal.   Musculoskeletal:         " General: No edema. Normal range of motion.      Cervical back: Normal range of motion and neck supple.   Lymphadenopathy:     He has no cervical adenopathy.   Neurological: He is alert and oriented to person, place, and time. He has normal reflexes.   Skin: Skin is warm and dry.   Psychiatric: He has a normal mood and affect.   Nursing note and vitals reviewed.    Personal Diagnostic Review        Personal Diagnostic Review  [x]  . 53 secs.  CPAP SummaryDOWNLOAD           [x]  SPIROMETRY      PFT    Lab Results   Component Value Date    BRPFT Spirometry is normal.   Â   Notes:  Â    2025    BRPFT Spirometry is normal.   Â   Notes:  Â    05/10/2024    BRPFT  11/10/2023     Spirometry is normal. Spirometry remains unimproved following bronchodilator.   Â   Notes:  Â   The failure to demonstrate improvement in spirometry does not preclude a clinical response to a trial of bronchodilators.          Erendira Pretty  2025 - 2025  Patient ID: [id number]  : 1945  Age: 80 years  Gender: Male  [facility]  [address] [facility], [id number]  Compliance Report  Compliance  Payor Standard  Usage 2025 - 2025  Usage days 89/90 days (99%)  >= 4 hours 88 days (98%)  < 4 hours 1 days (1%)  Usage hours 757 hours 41 minutes  Average usage (total days) 8 hours 25 minutes  Average usage (days used) 8 hours 31 minutes  Median usage (days used) 8 hours 36 minutes  Total used hours (value since last reset - 2025) 3,162 hours  AirSense 11 AutoSet  Serial number [id number]  Mode CPAP  Set pressure 11 cmH2O  EPR Fulltime  EPR level 2  Therapy  Leaks - L/min Median: 14.5 95th percentile: 49.0 Maximum: 119.9  Events per hour AI: 2.2 HI: 2.1 AHI: 4.3  Apnea Index Central: 0.6 Obstructive: 0.9 Unknown: 0.6  RERA Index 0.8  Cheyne-Fields respiration (average duration per night) 0 minutes (0%)  Usage - hours  Printed on      X-Ray Chest PA And Lateral  Narrative: EXAMINATION:  XR CHEST  "PA AND LATERAL    CLINICAL HISTORY:  Mild intermittent asthma, uncomplicated    TECHNIQUE:  PA and lateral views of the chest were performed.    COMPARISON:  Prior radiographs    FINDINGS:  Cardiac silhouette and mediastinal contours are stable.  Lungs are clear.  Osseous structures are intact.  Impression: No acute cardiopulmonary process.    Electronically signed by: Yogi Martin MD  Date:    08/07/2025  Time:    10:14     Clinical Guide to Interpretation or FeNO Levels :     FeNO  (ppb) LOW INTERMEDIATE HIGH   ADULT VALUES < 25 25-50          > 50   Th2-driven Inflammation Unlikely Likely Significant      Patients FeNO level at this visit : _12___ (ppb)     Interpretation of FeNO measurement in adults:  [x] FENO is less than 25 ppb implies non eosinophilic airway inflammation or the absence of airway inflammation.               Comment: Low FENO (<25 ppb) in adult asthmatics with persistent symptoms suggests other etiologies for these symptoms and a lower likelihood of benefit from adding or increasing inhaled glucocorticoids.         8/7/2025    10:22 AM 6/18/2025     8:09 AM 5/13/2025     1:24 PM 4/16/2025     8:24 AM 4/11/2025    11:41 AM 3/26/2025     7:37 AM 3/26/2025     7:00 AM   Pulmonary Function Tests   SpO2 98 % 98 % 99 % 98 %  98 % 98 %   Height 5' 7" (1.702 m) 5' 7" (1.702 m) 5' 7" (1.702 m) 5' 8.4" (1.737 m) 5' 8.4" (1.737 m)  5' 7" (1.702 m)   Weight 104.8 kg (231 lb) 102 kg (224 lb 13.9 oz) 103 kg (227 lb 1.2 oz) 104.1 kg (229 lb 8 oz) 103.9 kg (229 lb 0.9 oz)  105.5 kg (232 lb 9.4 oz)   BMI (Calculated) 36.2 35.2 35.6 34.5 34.4  36.4         Assessment:       Problem List Items Addressed This Visit       Essential hypertension (Chronic)    DELONTE on CPAP (Chronic)        8/7/2025   EPWORTH SLEEPINESS SCALE   Sitting and reading 0   Watching TV 3   Sitting, inactive in a public place (e.g. a theatre or a meeting) 0   As a passenger in a car for an hour without a break 0   Lying down to rest in " the afternoon when circumstances permit 0   Sitting and talking to someone 0   Sitting quietly after a lunch without alcohol 1   In a car, while stopped for a few minutes in traffic 0   Total score 4      Bed time 9 pm  Wake time: 6am    Data 05/08/2025- 08/05/2025  CPAP 11 cm  Usage > 4 hrs was 98%    AHI 4.3    USING AND BENEFITS         Relevant Orders    CPAP/BIPAP SUPPLIES    Chronic pansinusitis    Mixed hyperlipidemia    Class 2 severe obesity due to excess calories with serious comorbidity and body mass index (BMI) of 37.0 to 37.9 in adult    Mild intermittent asthma without complication - Primary    Asthma Control Test  In the past 4  weeks, how much of the time did your asthma keep you from getting as much done at work, school or at home?: None of the time  During the past 4 weeks, how often have you had shortness of breath?: Not at all  During the past 4 weeks, how often did your asthma symptoms (wheezing, couging, shortness of breath, chest tightness or pain) wake you up at night or earlier than usual in the morning?: Not at all  During the past 4 weeks, how often have you used your rescue inhaler or nebulizer medication (such as albuterol)?: Not at all  How would you rate your asthma control during the past 4 weeks?: Completely controlled  If your score is 19 or less, your asthma may not be under control: 25      Requested Prescriptions     Pending Prescriptions Disp Refills    albuterol (PROVENTIL HFA) 90 mcg/actuation inhaler 18 g 3     Sig: Inhale 2 puffs into the lungs every 6 (six) hours as needed for Wheezing. Rescue    budesonide-formoterol 160-4.5 mcg (SYMBICORT) 160-4.5 mcg/actuation HFAA 10.2 g 11     Sig: INHALE 2 PUFFS INTO THE LUNGS TWO TIMES A DAY.    montelukast (SINGULAIR) 10 mg tablet 90 tablet 3     Sig: Take 1 tablet (10 mg total) by mouth once daily.             Relevant Medications    albuterol (PROVENTIL HFA) 90 mcg/actuation inhaler    budesonide-formoterol 160-4.5 mcg (SYMBICORT)  160-4.5 mcg/actuation HFAA    montelukast (SINGULAIR) 10 mg tablet    Other Relevant Orders    X-Ray Chest PA And Lateral    Spirometry without Bronchodilator    Fraction of  Nitric Oxide        Plan:          Requested Prescriptions     Signed Prescriptions Disp Refills    albuterol (PROVENTIL HFA) 90 mcg/actuation inhaler 18 g 3     Sig: Inhale 2 puffs into the lungs every 6 (six) hours as needed for Wheezing. Rescue    budesonide-formoterol 160-4.5 mcg (SYMBICORT) 160-4.5 mcg/actuation HFAA 10.2 g 11     Sig: INHALE 2 PUFFS INTO THE LUNGS TWO TIMES A DAY.    montelukast (SINGULAIR) 10 mg tablet 90 tablet 3     Sig: Take 1 tablet (10 mg total) by mouth once daily.     Orders Placed This Encounter   Procedures    CPAP/BIPAP SUPPLIES     Length of need (1-99 months)::   99     Choose ONE mask type and its corresponding cushions and/or pillows::    Nasal Mask, 1 per 90 days:  Nasal Cushions, (6 per 90 days):  Nasal Pillows, (6 per 90 days)     Choose EITHER Heated or Non-Heated Tubing::    Non-Heated Tubing, 1 per 90 days     All other supplies as needed as listed below::    Chin Strap, 1 per 180 days     All other supplies as needed as listed below::    Headgear, 1 per 180 days     All other supplies as needed as listed below::    Disposable Filter, 6 per 90 days     All other supplies as needed as listed below::    Non-Disposable Filter, 1 per 180 days     All other supplies as needed as listed below::    Exhalation Port, contact payer for quantity/frequency     All other supplies as needed as listed below::    Humidifier Chamber, 1 per 180 days     DME Agency::   [facility]    X-Ray Chest PA And Lateral     Standing Status:   Future     Expected Date:   2025     Expiration Date:   2026    Spirometry without Bronchodilator     Standing Status:   Future     Expiration Date:   2026     Release to patient:   Immediate    Fraction  of  Nitric Oxide     Standing Status:   Future     Expiration Date:   2026     Release to patient:   Immediate            Follow up in about 1 year (around 2026), or cxr, terry, Feno, weight loss, CPAP supplies, refill meds.    This note was prepared using voice recognition system and is likely to have sound alike errors that may have been overlooked even after proof reading.  Please call me with any questions    Discussed diagnosis, its evaluation, treatment and usual course. All questions answered.    Thank you for the courtesy of participating in the care of this patient    Rishi Molina MD

## 2025-08-07 NOTE — ASSESSMENT & PLAN NOTE
8/7/2025   EPWORTH SLEEPINESS SCALE   Sitting and reading 0   Watching TV 3   Sitting, inactive in a public place (e.g. a theatre or a meeting) 0   As a passenger in a car for an hour without a break 0   Lying down to rest in the afternoon when circumstances permit 0   Sitting and talking to someone 0   Sitting quietly after a lunch without alcohol 1   In a car, while stopped for a few minutes in traffic 0   Total score 4      Bed time 9 pm  Wake time: 6am    Data 05/08/2025- 08/05/2025  CPAP 11 cm  Usage > 4 hrs was 98%    AHI 4.3    USING AND BENEFITS

## 2025-08-07 NOTE — PROCEDURES
Clinical Guide to Interpretation or FeNO Levels :    FeNO  (ppb) LOW INTERMEDIATE HIGH   ADULT VALUES < 25 25-50          > 50   Th2-driven Inflammation Unlikely Likely Significant     Patients FeNO level at this visit : _12___ (ppb)    Interpretation of FeNO measurement in adults:  [x] FENO is less than 25 ppb implies non eosinophilic airway inflammation or the absence of airway inflammation.    Comment: Low FENO (<25 ppb) in adult asthmatics with persistent symptoms suggests other etiologies for these symptoms and a lower likelihood of benefit from adding or increasing inhaled glucocorticoids.    [] FENO between 25 and 50 ppb in adults should be interpreted cautiously with reference to the clinical situation (eg, symptomatic, on or off therapy, current smoking).    [] FENO greater than 50 ppb in adults  suggests eosinophilic airway inflammation   Comment: High FENO (>50 ppb) in adult asthmatics even with atypical symptoms suggests glucocorticoid responsiveness. High FENO (>50 ppb) can help identify poor adherence or uncontrolled inflammation in asthma patients with otherwise seemingly controlled asthma.    Discussion:  A FENO less than 25 ppb in adults and less than 20 ppb in children younger than 12 years of age implies noneosinophilic airway inflammation or the absence of airway inflammation.  A FENO greater than 50 ppb in adults or greater than 35 ppb in children suggests eosinophilic airway inflammation.   Values of FENO between 25 and 50 ppb in adults (20 to 35 ppb in children) should be interpreted cautiously with reference to the clinical situation (eg, symptomatic, on or off therapy, current smoking).  A rising FENO with a greater than 20 percent change and more than 25 ppb (20 ppb in children) from a previously stable level suggests increasing eosinophilic airway inflammation, but there are wide inter-individual differences.  A decrease in FENO greater than 20 percent for values over 50 ppb or more than  10 ppb for values less than 50 ppb may be clinically important.  ?FENO in other respiratory diseases - Several other diseases are associated with altered levels of exhaled NO: low levels of FENO have been noted in cystic fibrosis, current smoking, pulmonary hypertension, hypothermia, primary ciliary dyskinesia, and bronchopulmonary dysplasia. Elevated FENO has been noted in atopy, nonasthmatic eosinophilic bronchitis, COPD exacerbations, noncystic fibrosis bronchiectasis, and viral upper respiratory infections.    REFERENCE:  ATS Board of Directors, December 2004, and by the ERS Executive Committee, June 2004. ATS/ERS Recommendations for Standardized Procedures for the Online and Offline Measurement of Exhaled Lower Respiratory Nitric Oxide and Nasal Nitric Oxide. Guideline 2005

## 2025-08-07 NOTE — ASSESSMENT & PLAN NOTE
Asthma Control Test  In the past 4  weeks, how much of the time did your asthma keep you from getting as much done at work, school or at home?: None of the time  During the past 4 weeks, how often have you had shortness of breath?: Not at all  During the past 4 weeks, how often did your asthma symptoms (wheezing, couging, shortness of breath, chest tightness or pain) wake you up at night or earlier than usual in the morning?: Not at all  During the past 4 weeks, how often have you used your rescue inhaler or nebulizer medication (such as albuterol)?: Not at all  How would you rate your asthma control during the past 4 weeks?: Completely controlled  If your score is 19 or less, your asthma may not be under control: 25      Requested Prescriptions     Pending Prescriptions Disp Refills    albuterol (PROVENTIL HFA) 90 mcg/actuation inhaler 18 g 3     Sig: Inhale 2 puffs into the lungs every 6 (six) hours as needed for Wheezing. Rescue    budesonide-formoterol 160-4.5 mcg (SYMBICORT) 160-4.5 mcg/actuation HFAA 10.2 g 11     Sig: INHALE 2 PUFFS INTO THE LUNGS TWO TIMES A DAY.    montelukast (SINGULAIR) 10 mg tablet 90 tablet 3     Sig: Take 1 tablet (10 mg total) by mouth once daily.

## 2025-08-10 DIAGNOSIS — E78.5 HYPERLIPIDEMIA, UNSPECIFIED: ICD-10-CM

## 2025-08-14 RX ORDER — EVOLOCUMAB 140 MG/ML
140 INJECTION, SOLUTION SUBCUTANEOUS
Qty: 2 ML | Refills: 11 | Status: SHIPPED | OUTPATIENT
Start: 2025-08-14

## 2025-08-25 ENCOUNTER — LAB VISIT (OUTPATIENT)
Dept: LAB | Facility: HOSPITAL | Age: 80
End: 2025-08-25
Attending: NURSE PRACTITIONER
Payer: MEDICARE

## 2025-08-25 DIAGNOSIS — E61.1 IRON DEFICIENCY: ICD-10-CM

## 2025-08-25 DIAGNOSIS — D64.9 NORMOCYTIC ANEMIA: ICD-10-CM

## 2025-08-25 DIAGNOSIS — K55.21 AVM (ARTERIOVENOUS MALFORMATION) OF SMALL BOWEL, ACQUIRED WITH HEMORRHAGE: ICD-10-CM

## 2025-08-25 LAB
ABSOLUTE EOSINOPHIL (OHS): 0.16 K/UL
ABSOLUTE MONOCYTE (OHS): 0.44 K/UL (ref 0.3–1)
ABSOLUTE NEUTROPHIL COUNT (OHS): 2.17 K/UL (ref 1.8–7.7)
ALBUMIN SERPL BCP-MCNC: 3.5 G/DL (ref 3.5–5.2)
ALP SERPL-CCNC: 75 UNIT/L (ref 40–150)
ALT SERPL W/O P-5'-P-CCNC: 13 UNIT/L (ref 10–44)
ANION GAP (OHS): 9 MMOL/L (ref 8–16)
AST SERPL-CCNC: 24 UNIT/L (ref 11–45)
BASOPHILS # BLD AUTO: 0.04 K/UL
BASOPHILS NFR BLD AUTO: 1 %
BILIRUB SERPL-MCNC: 0.3 MG/DL (ref 0.1–1)
BUN SERPL-MCNC: 15 MG/DL (ref 8–23)
CALCIUM SERPL-MCNC: 8.8 MG/DL (ref 8.7–10.5)
CHLORIDE SERPL-SCNC: 107 MMOL/L (ref 95–110)
CO2 SERPL-SCNC: 25 MMOL/L (ref 23–29)
CREAT SERPL-MCNC: 0.9 MG/DL (ref 0.5–1.4)
ERYTHROCYTE [DISTWIDTH] IN BLOOD BY AUTOMATED COUNT: 13.1 % (ref 11.5–14.5)
FERRITIN SERPL-MCNC: 30 NG/ML (ref 20–300)
GFR SERPLBLD CREATININE-BSD FMLA CKD-EPI: >60 ML/MIN/1.73/M2
GLUCOSE SERPL-MCNC: 133 MG/DL (ref 70–110)
HCT VFR BLD AUTO: 42.4 % (ref 40–54)
HGB BLD-MCNC: 14.2 GM/DL (ref 14–18)
IMM GRANULOCYTES # BLD AUTO: 0 K/UL (ref 0–0.04)
IMM GRANULOCYTES NFR BLD AUTO: 0 % (ref 0–0.5)
IRON SATN MFR SERPL: 11 % (ref 20–50)
IRON SERPL-MCNC: 40 UG/DL (ref 45–160)
LYMPHOCYTES # BLD AUTO: 1 K/UL (ref 1–4.8)
MCH RBC QN AUTO: 32.1 PG (ref 27–31)
MCHC RBC AUTO-ENTMCNC: 33.5 G/DL (ref 32–36)
MCV RBC AUTO: 96 FL (ref 82–98)
NUCLEATED RBC (/100WBC) (OHS): 0 /100 WBC
PLATELET # BLD AUTO: 143 K/UL (ref 150–450)
PMV BLD AUTO: 9.6 FL (ref 9.2–12.9)
POTASSIUM SERPL-SCNC: 4.1 MMOL/L (ref 3.5–5.1)
PROT SERPL-MCNC: 7.2 GM/DL (ref 6–8.4)
RBC # BLD AUTO: 4.42 M/UL (ref 4.6–6.2)
RELATIVE EOSINOPHIL (OHS): 4.2 %
RELATIVE LYMPHOCYTE (OHS): 26.2 % (ref 18–48)
RELATIVE MONOCYTE (OHS): 11.5 % (ref 4–15)
RELATIVE NEUTROPHIL (OHS): 57.1 % (ref 38–73)
SODIUM SERPL-SCNC: 141 MMOL/L (ref 136–145)
TIBC SERPL-MCNC: 374 UG/DL (ref 250–450)
TRANSFERRIN SERPL-MCNC: 253 MG/DL (ref 200–375)
WBC # BLD AUTO: 3.81 K/UL (ref 3.9–12.7)

## 2025-08-25 PROCEDURE — 36415 COLL VENOUS BLD VENIPUNCTURE: CPT | Mod: PO

## 2025-08-25 PROCEDURE — 84295 ASSAY OF SERUM SODIUM: CPT

## 2025-08-25 PROCEDURE — 82728 ASSAY OF FERRITIN: CPT

## 2025-08-25 PROCEDURE — 85025 COMPLETE CBC W/AUTO DIFF WBC: CPT

## 2025-08-25 PROCEDURE — 83540 ASSAY OF IRON: CPT

## 2025-08-28 ENCOUNTER — TELEPHONE (OUTPATIENT)
Dept: INTERNAL MEDICINE | Facility: CLINIC | Age: 80
End: 2025-08-28
Payer: MEDICARE

## 2025-08-29 ENCOUNTER — OFFICE VISIT (OUTPATIENT)
Dept: HEMATOLOGY/ONCOLOGY | Facility: CLINIC | Age: 80
End: 2025-08-29
Payer: MEDICARE

## 2025-08-29 DIAGNOSIS — D69.6 THROMBOCYTOPENIA: ICD-10-CM

## 2025-08-29 DIAGNOSIS — K55.20 AVM (ARTERIOVENOUS MALFORMATION) OF SMALL BOWEL, ACQUIRED: Primary | ICD-10-CM

## 2025-08-29 DIAGNOSIS — K74.69 COMPENSATED HCV CIRRHOSIS: ICD-10-CM

## 2025-08-29 DIAGNOSIS — B19.20 COMPENSATED HCV CIRRHOSIS: ICD-10-CM

## 2025-08-29 DIAGNOSIS — D50.9 IRON DEFICIENCY ANEMIA, UNSPECIFIED IRON DEFICIENCY ANEMIA TYPE: ICD-10-CM

## 2025-09-03 DIAGNOSIS — K21.9 GASTROESOPHAGEAL REFLUX DISEASE WITHOUT ESOPHAGITIS: ICD-10-CM

## 2025-09-04 ENCOUNTER — OFFICE VISIT (OUTPATIENT)
Dept: OTOLARYNGOLOGY | Facility: CLINIC | Age: 80
End: 2025-09-04
Payer: MEDICARE

## 2025-09-04 VITALS — WEIGHT: 231.06 LBS | BODY MASS INDEX: 36.19 KG/M2

## 2025-09-04 DIAGNOSIS — Z98.890 HISTORY OF ENDOSCOPIC SINUS SURGERY: ICD-10-CM

## 2025-09-04 DIAGNOSIS — J32.4 CHRONIC PANSINUSITIS: Primary | ICD-10-CM

## 2025-09-04 PROCEDURE — 99999 PR PBB SHADOW E&M-EST. PATIENT-LVL III: CPT | Mod: PBBFAC,,, | Performed by: STUDENT IN AN ORGANIZED HEALTH CARE EDUCATION/TRAINING PROGRAM

## 2025-09-04 RX ORDER — RABEPRAZOLE SODIUM 20 MG/1
20 TABLET, DELAYED RELEASE ORAL 2 TIMES DAILY
Qty: 180 TABLET | Refills: 2 | Status: SHIPPED | OUTPATIENT
Start: 2025-09-04

## 2025-09-04 RX ORDER — PREDNISONE 10 MG/1
TABLET ORAL
Qty: 24 TABLET | Refills: 0 | Status: SHIPPED | OUTPATIENT
Start: 2025-09-04

## 2025-09-04 RX ORDER — AMOXICILLIN AND CLAVULANATE POTASSIUM 875; 125 MG/1; MG/1
1 TABLET, FILM COATED ORAL EVERY 12 HOURS
Qty: 20 TABLET | Refills: 0 | Status: SHIPPED | OUTPATIENT
Start: 2025-09-04 | End: 2025-09-14

## (undated) DEVICE — WIRE ROSEN .035X260

## (undated) DEVICE — CATH 16FR URETHRL RED RUB

## (undated) DEVICE — CLIP LIGATING MEDIUM

## (undated) DEVICE — Device

## (undated) DEVICE — CONTRAST OMNIPAQUE 240 50ML

## (undated) DEVICE — CATH INFINITI 4F 3DRC 100CM

## (undated) DEVICE — KIT ANTIFOG

## (undated) DEVICE — UROVIEW 2600/2800

## (undated) DEVICE — BOOT SUTURE AID

## (undated) DEVICE — SOL 9P NACL IRR PIC IL

## (undated) DEVICE — CATH MAP BI-D HD SENSOR ENABLE

## (undated) DEVICE — CATH AL1 5FR

## (undated) DEVICE — CATH IMPULSE IM CRV 100CM 5FR

## (undated) DEVICE — SUT SILK 2-0 STRANDS 30IN

## (undated) DEVICE — SHEATH HEMOSTASIS 8.5FR

## (undated) DEVICE — COVERS PROBE NR-48 STERILE

## (undated) DEVICE — GLOVE PROTEXIS HYDROGEL SZ8

## (undated) DEVICE — KIT CUSTOM MANIFOLD

## (undated) DEVICE — SEE MEDLINE ITEM 152622

## (undated) DEVICE — SHEATH INTRODUCER 6FR 11CM

## (undated) DEVICE — NDL SPINAL SPINOCAN 22GX3.5

## (undated) DEVICE — PACK OPEN HEART BR

## (undated) DEVICE — GUIDEWIRE WHOLEY HI TORQ 175CM

## (undated) DEVICE — SEE MEDLINE ITEM 157185

## (undated) DEVICE — DRESSING TRANS 4X4 TEGADERM

## (undated) DEVICE — SUT STEEL 7 MONO B&S18 CCS

## (undated) DEVICE — BLADE SHAVER 4MM60DEG STRAIGHT

## (undated) DEVICE — STOPCOCK 1 WAY PLASTIC

## (undated) DEVICE — CATH INFINITI MULTIPAK JR4 5FR

## (undated) DEVICE — GAUZE SPONGE 4X4 12PLY

## (undated) DEVICE — COVER INSTR ELASTIC BAND 40X20

## (undated) DEVICE — BAND TR COMP DEVICE REG 24CM

## (undated) DEVICE — CATH CV QD LUMN 6FRX110CM

## (undated) DEVICE — SEE MEDLINE ITEM 146231

## (undated) DEVICE — SOL NS 1000CC

## (undated) DEVICE — BAND TR WITH INFLATOR

## (undated) DEVICE — SEE MEDLINE ITEM 154981

## (undated) DEVICE — ELECTRODE REM PLYHSV RETURN 9

## (undated) DEVICE — NDL TRNSSPTL BRK-1 18GA 71CM

## (undated) DEVICE — CATH PIGTAIL 5FX110CM

## (undated) DEVICE — SYS VEIN HARVESTING

## (undated) DEVICE — INFLATOR ENCORE

## (undated) DEVICE — SUT 8/0 24IN PROLENE BL MON

## (undated) DEVICE — INTRO SWARTZ SL2 8.5FR 63CM

## (undated) DEVICE — CANNULA VSL W/DUCKBILL

## (undated) DEVICE — CATH AL1 4FR

## (undated) DEVICE — SET ARTERIAL CATH 20GX4.25IN

## (undated) DEVICE — SEE MEDLINE ITEM 152487

## (undated) DEVICE — TUBESET MULTIDEBRIDER DECLOG

## (undated) DEVICE — R CATH BIDIRECTIONL DF CRV 7FR

## (undated) DEVICE — VISE RADIFOCUS MULTI TORQUE

## (undated) DEVICE — INTRO AGILIS MED CRL 8.5F 71CM

## (undated) DEVICE — SUT MONOCRYL 4-0 PS-1 UND

## (undated) DEVICE — SEE MEDLINE ITEM 157117

## (undated) DEVICE — SOL WATER STRL IRR 1000ML

## (undated) DEVICE — ADHESIVE MASTISOL VIAL 48/BX

## (undated) DEVICE — SEE MEDLINE ITEM 146308

## (undated) DEVICE — SEE MEDLINE ITEM 157027

## (undated) DEVICE — SEE MEDLINE ITEM 157166

## (undated) DEVICE — ELECTRODE BLADE E-Z CLEAN 4IN

## (undated) DEVICE — DRESSING TEGADERM 2 3/8 X 2.75

## (undated) DEVICE — SET MICROPUNCTURE

## (undated) DEVICE — RETRACTOR OCTOBASE INSERT HOLD

## (undated) DEVICE — SET DECANTER MEDICHOICE

## (undated) DEVICE — CATH JL4 5FR

## (undated) DEVICE — CATH MPA2 INFINITI 5FR 125CM

## (undated) DEVICE — GUIDEWIRE STF .035X260CM STR

## (undated) DEVICE — SUT PERMA HAND SILK BLK 0-0

## (undated) DEVICE — CLOSURE SKIN 1/4INX1-1/2IN

## (undated) DEVICE — TUBING CNTRST INJ ADPT 72IN

## (undated) DEVICE — SET TUBING COOL POINT IRR

## (undated) DEVICE — GOWN SURG 2XL DISP TIE BACK

## (undated) DEVICE — CABLE PACEMAKER CARD 6FT BLUE

## (undated) DEVICE — CANNULA 21FR 7MM SOFT FLOW EXT

## (undated) DEVICE — SYS WATCHMAN FXD CURVE DBL US

## (undated) DEVICE — BLADE SHAVER 4MM 60D STD SERR

## (undated) DEVICE — OMNIPAQUE 300MG 150ML VIAL

## (undated) DEVICE — GLOVE BIOGEL 7.5

## (undated) DEVICE — DRAPE SLUSH WARMER WITH DISC

## (undated) DEVICE — BAG DRAIN URINARY CONT IRRG

## (undated) DEVICE — WIRE TEMP PACING 0 SH SKS-3

## (undated) DEVICE — TRAY FOLEY SURESTEP 16FR

## (undated) DEVICE — SET INTRO PERFRMR SHTH 16FR

## (undated) DEVICE — PAD GROUND UNIV STYLE CORD 9IN

## (undated) DEVICE — KIT GLIDESHEATH SLEND 6FR 10CM

## (undated) DEVICE — SUT VICRYL 2-0 36 CT-1

## (undated) DEVICE — SUT VICRYL 1 OB 36 CTX

## (undated) DEVICE — CATH INFANT VENT 10 FRENCHMALL

## (undated) DEVICE — GUIDEWIRE EMERALD .035IN 260CM

## (undated) DEVICE — INSERTS STEALTH FIBRA SIZE 5

## (undated) DEVICE — SOL ISOLYTE S PH 7.4 1000ML

## (undated) DEVICE — KIT ENSITE ELECTRODE SURFACE

## (undated) DEVICE — NDL TRNSSPTL BRK-1 18GA 98CM

## (undated) DEVICE — SEE MEDLINE ITEM 146417

## (undated) DEVICE — GUIDEWIRE AMPLATZ XSTIFF 260CM

## (undated) DEVICE — GUIDE LAUNCHER 6FR EBU 3.5

## (undated) DEVICE — SUT SILK 1 BLK BRAID SA87G

## (undated) DEVICE — BLOWER MISTER

## (undated) DEVICE — DECANTER FLUID TRNSF WHITE 9IN

## (undated) DEVICE — SPONGE GAUZE 16PLY 4X4

## (undated) DEVICE — SET PERFUSION

## (undated) DEVICE — SUPPORT ULNA NERVE PROTECTOR

## (undated) DEVICE — LIGATING CLIP LARGE

## (undated) DEVICE — WIRE GUIDE TEFLON 3CM .035 145

## (undated) DEVICE — EVACUATOR WOUND BULB 100CC

## (undated) DEVICE — DRAIN CHEST DRY SUCTION

## (undated) DEVICE — SUT SILK 2-0 SH 18IN BLACK

## (undated) DEVICE — CONTAINER SPECIMEN STRL 4OZ

## (undated) DEVICE — DEV-O-LOOPS MAXI RED

## (undated) DEVICE — FILTER FASTSTART KIT 40 MICRON

## (undated) DEVICE — R CATH ACUSON ACUNAV 8FR

## (undated) DEVICE — TUBING HEATED INSUFFLATOR

## (undated) DEVICE — COVER SURG LIGHT HANDLE

## (undated) DEVICE — GLOVE 6.0 PROTEXIS PI BLUE

## (undated) DEVICE — KIT NEEDLE GUIDE 18G

## (undated) DEVICE — SPIKE SHORT LG BORE 1-WAY 2IN

## (undated) DEVICE — CANNULA PERF RCSP 15FR SINUS

## (undated) DEVICE — SYR 30CC LUER LOCK

## (undated) DEVICE — SKIN MARKER DEVON 160

## (undated) DEVICE — SEE MEDLINE ITEM 152739

## (undated) DEVICE — ANGIOTOUCH KIT

## (undated) DEVICE — BLLN MUSTANG 4 X 20 X 75

## (undated) DEVICE — CATH ANGIO DIAG PIG 6FX110

## (undated) DEVICE — PACK EP DRAPE OMC

## (undated) DEVICE — BLADE KNIFE UNITOME 4.0MM

## (undated) DEVICE — CLIPS LIGATING TITANIUM WDE SM

## (undated) DEVICE — SHEET DRAPE FAN-FOLDED 3/4

## (undated) DEVICE — INTRODUCER HEMOSTASIS 7.5F

## (undated) DEVICE — SUT PROLENE 6-0 C-1 30IN BL

## (undated) DEVICE — GAUZE WOVEN STRL 12-PLY 4X4IN

## (undated) DEVICE — DEVICE MYNX GRIP 6/7F

## (undated) DEVICE — DRAPE INCISE IOBAN 2 23X17IN

## (undated) DEVICE — SPONGE PATTY SURGICAL .5X3IN

## (undated) DEVICE — SYR 50ML CATH TIP

## (undated) DEVICE — DIALATOR COONS TAPER 12F 20CM

## (undated) DEVICE — SEE MEDLINE ITEM 146347

## (undated) DEVICE — INTRODUCER R/O II 7FX10 W/.038

## (undated) DEVICE — CANNULA IMA 1MM

## (undated) DEVICE — CANNULA VENOUS MC2X 29FR

## (undated) DEVICE — CONTRAST OMNIPAQUE 240 150ML

## (undated) DEVICE — SYS LABEL CORRECT MED

## (undated) DEVICE — BLADE SCALP OPHTL BEVEL STR

## (undated) DEVICE — COUNTER BDL BLADEGUARD LI DBL

## (undated) DEVICE — DEVICE PERCLOSE SUT CLSR 6FR

## (undated) DEVICE — SYR 10CC LUER LOCK

## (undated) DEVICE — DRAIN CHAN RND HUBLS 8MM 24FR

## (undated) DEVICE — SYR 20ML BLUE LUER LOCK

## (undated) DEVICE — ORGNZR TUBING CLR W/CLIPS

## (undated) DEVICE — TUBING PRESSURE MALE/FEMALE

## (undated) DEVICE — DRESSING TRANS 2X2 TEGADERM

## (undated) DEVICE — CATH AR2 5FR

## (undated) DEVICE — NDL HYPO 27G X 1 1/2

## (undated) DEVICE — KIT SURGIFLO HEMOSTATIC MATRIX

## (undated) DEVICE — CATH ANGIO PGTL 5F .035 65CM

## (undated) DEVICE — SET IRRIGATION WARMING NORMAL

## (undated) DEVICE — SYR B-D DISP CONTROL 10CC100/C

## (undated) DEVICE — CATH JR4 5FR

## (undated) DEVICE — SUT 7/0 24IN PROLENE BL MO

## (undated) DEVICE — DRAIN CHANNEL ROUND 19FR

## (undated) DEVICE — PAD DEFIB CADENCE ADULT R2

## (undated) DEVICE — BASIN SPLASH SHLD W/PAD BLUE

## (undated) DEVICE — COVER PRB TRNSDUC 7.6X183CM

## (undated) DEVICE — GUIDEWIRE STF .035X260CM ANG

## (undated) DEVICE — DILATOR COONS TAPER 14FR

## (undated) DEVICE — MANIFOLD 4 PORT

## (undated) DEVICE — EVACUATOR BLADDER UROVAC ADPT

## (undated) DEVICE — DRESSING MEPORE ADH 3.5X12

## (undated) DEVICE — TAPE MEDIPORE 4IN X 2YDS

## (undated) DEVICE — CATH TACTFLEX SE BID CURVE F-J

## (undated) DEVICE — CANNULA AG CARDPLG 14G FLANGE

## (undated) DEVICE — SPONGE LAP 18X18 PREWASHED

## (undated) DEVICE — SUT PROLENE 4-0 RB-1 BL MO

## (undated) DEVICE — PUNCH AORTIC 4.0MM 6/CASE

## (undated) DEVICE — PACK HEART CATH BR

## (undated) DEVICE — SYR MARK 7 ARTERION 150ML

## (undated) DEVICE — KIT PREVENA PLUS

## (undated) DEVICE — CATH SEEKER .035IN 90CM

## (undated) DEVICE — GOWN SMARTGOWN LVL4 X-LONG XL

## (undated) DEVICE — SHEATH TOURGUIDE 7FR 9MM 5CM

## (undated) DEVICE — KIT PROBE COVER WITH GEL

## (undated) DEVICE — HANDSWITCH SUCTION COAG

## (undated) DEVICE — SUT PROLENE 4-0 SH BLU 36IN

## (undated) DEVICE — PACK CATH LAB CUSTOM BR

## (undated) DEVICE — COVER OVERHEAD SURG LT BLUE

## (undated) DEVICE — GUIDE WIRE WHOLEY EXCHANGE 300

## (undated) DEVICE — KIT INTRO MICRO NIT VSI 4FR